# Patient Record
Sex: FEMALE | Race: WHITE | NOT HISPANIC OR LATINO | Employment: UNEMPLOYED | URBAN - METROPOLITAN AREA
[De-identification: names, ages, dates, MRNs, and addresses within clinical notes are randomized per-mention and may not be internally consistent; named-entity substitution may affect disease eponyms.]

---

## 2017-05-08 ENCOUNTER — LAB REQUISITION (OUTPATIENT)
Dept: LAB | Facility: HOSPITAL | Age: 57
End: 2017-05-08
Payer: COMMERCIAL

## 2017-05-08 ENCOUNTER — ALLSCRIPTS OFFICE VISIT (OUTPATIENT)
Dept: OTHER | Facility: OTHER | Age: 57
End: 2017-05-08

## 2017-05-08 DIAGNOSIS — E11.40 TYPE 2 DIABETES MELLITUS WITH DIABETIC NEUROPATHY (HCC): ICD-10-CM

## 2017-05-08 DIAGNOSIS — E55.9 VITAMIN D DEFICIENCY: ICD-10-CM

## 2017-05-08 DIAGNOSIS — E11.65 TYPE 2 DIABETES MELLITUS WITH HYPERGLYCEMIA (HCC): ICD-10-CM

## 2017-05-08 DIAGNOSIS — R01.1 CARDIAC MURMUR: ICD-10-CM

## 2017-05-08 LAB
25(OH)D3 SERPL-MCNC: 13.1 NG/ML (ref 30–100)
ALBUMIN SERPL BCP-MCNC: 3.3 G/DL (ref 3.5–5)
ALP SERPL-CCNC: 153 U/L (ref 46–116)
ALT SERPL W P-5'-P-CCNC: 48 U/L (ref 12–78)
ANION GAP SERPL CALCULATED.3IONS-SCNC: 11 MMOL/L (ref 4–13)
AST SERPL W P-5'-P-CCNC: 20 U/L (ref 5–45)
BILIRUB SERPL-MCNC: 0.33 MG/DL (ref 0.2–1)
BUN SERPL-MCNC: 16 MG/DL (ref 5–25)
CALCIUM SERPL-MCNC: 9 MG/DL (ref 8.3–10.1)
CHLORIDE SERPL-SCNC: 101 MMOL/L (ref 100–108)
CHOLEST SERPL-MCNC: 327 MG/DL (ref 50–200)
CO2 SERPL-SCNC: 25 MMOL/L (ref 21–32)
CREAT SERPL-MCNC: 0.56 MG/DL (ref 0.6–1.3)
CREAT UR-MCNC: 57.3 MG/DL
EST. AVERAGE GLUCOSE BLD GHB EST-MCNC: 286 MG/DL
GFR SERPL CREATININE-BSD FRML MDRD: >60 ML/MIN/1.73SQ M
GLUCOSE P FAST SERPL-MCNC: 309 MG/DL (ref 65–99)
HBA1C MFR BLD: 11.6 % (ref 4.2–6.3)
HDLC SERPL-MCNC: 39 MG/DL (ref 40–60)
MICROALBUMIN UR-MCNC: 1590 MG/L (ref 0–20)
MICROALBUMIN/CREAT 24H UR: 2775 MG/G CREATININE (ref 0–30)
POTASSIUM SERPL-SCNC: 4.3 MMOL/L (ref 3.5–5.3)
PROT SERPL-MCNC: 6.5 G/DL (ref 6.4–8.2)
SODIUM SERPL-SCNC: 137 MMOL/L (ref 136–145)
TRIGL SERPL-MCNC: 587 MG/DL
TSH SERPL DL<=0.05 MIU/L-ACNC: 3.61 UIU/ML (ref 0.36–3.74)

## 2017-05-08 PROCEDURE — 84443 ASSAY THYROID STIM HORMONE: CPT | Performed by: PHYSICIAN ASSISTANT

## 2017-05-08 PROCEDURE — 83036 HEMOGLOBIN GLYCOSYLATED A1C: CPT | Performed by: PHYSICIAN ASSISTANT

## 2017-05-08 PROCEDURE — 82306 VITAMIN D 25 HYDROXY: CPT | Performed by: PHYSICIAN ASSISTANT

## 2017-05-08 PROCEDURE — 80061 LIPID PANEL: CPT | Performed by: PHYSICIAN ASSISTANT

## 2017-05-08 PROCEDURE — 80053 COMPREHEN METABOLIC PANEL: CPT | Performed by: PHYSICIAN ASSISTANT

## 2017-05-08 PROCEDURE — 82043 UR ALBUMIN QUANTITATIVE: CPT | Performed by: PHYSICIAN ASSISTANT

## 2017-05-08 PROCEDURE — 82570 ASSAY OF URINE CREATININE: CPT | Performed by: PHYSICIAN ASSISTANT

## 2017-05-12 ENCOUNTER — GENERIC CONVERSION - ENCOUNTER (OUTPATIENT)
Dept: OTHER | Facility: OTHER | Age: 57
End: 2017-05-12

## 2018-01-09 NOTE — PROGRESS NOTES
Assessment    1  Benign essential hypertension (401 1) (I10)   2  Diabetes type 2, uncontrolled (250 02) (E11 65)   3  Swelling of both hands (729 81) (M79 89)    Plan  Benign essential hypertension    · Follow-up visit in 1 month Evaluation and Treatment  Follow-up  Status: Complete  Done:  62MJB9507  Benign essential hypertension, Tachycardia    · Metoprolol Succinate ER 50 MG Oral Tablet Extended Release 24 Hour; Take 1  tablet daily  Diabetes type 2, uncontrolled    · Janumet  MG Oral Tablet; take 1 tablet twice daily as directed  Swelling of both hands    · * XR HAND 3+ VIEW LEFT; Status:Active; Requested XZI:07PWO0607;    · * XR HAND 3+ VIEW RIGHT; Status:Active; Requested TRB:96IYJ0650;     Discussion/Summary    Discussed pt's uncontrolled Type 2 DM and her issues with affordability of her medication  She needs to let us know as soon as possible regarding these things as she has now been off her Janumet for 3 weeks and is thus not feeling well and her sugars are up  I gave her #56 tablets worth of samples to cover her for 4 weeks and gave her Rx savings card with 30 day Rx to see if this helps bring the cost down for her  She reports that she has satisfied her deductible and it appears that Janumet is brand preferred yet she says a 90 day supply costs $1000  I will continue to sample her if not covered  Her BP is significantly uncontrolled today despite her taking all of her BP meds regularly and compliantly  She risks another stroke if it continues to be this high  I will increase her Metoprolol to 50 mg once daily and would like her to buy BP cuff to use at home to record log of readings daily and I would like her to F/U in 1 month  If no improvement in her BP at that time, will need to work up further which may include kidney US and referral to a nephrologist/HTN specialist  I will further evaluate her B/L hand pain and swelling first with X-rays and call with results once obtained   If unremarkable, will check BW for autoimmune/arthritis panel  Possible side effects of new medications were reviewed with the patient/guardian today  The treatment plan was reviewed with the patient/guardian  The patient/guardian understands and agrees with the treatment plan      Chief Complaint  Pt here to discuss diabetes  She has stopped her Janumet because of cost  She is also presents with joint pain  History of Present Illness  Pt  presents for F/U of her Type 2 DM  She reports she stopped her Janumet about 3 weeks ago due to cost and now her sugars have gone up on average between 30-50 mg/dl  She does not feel very well right now  She is taking all of her other meds daily and compliantly without issues  She is having swelling in her hands as well and does not know why  Review of Systems    Constitutional: as noted in HPI  Cardiovascular: No complaints of slow heart rate, no fast heart rate, no chest pain, no palpitations, no leg claudication, no lower extremity edema  Respiratory: No complaints of shortness of breath, no wheezing, no cough, no SOB on exertion, no orthopnea, no PND  Gastrointestinal: No complaints of abdominal pain, no constipation, no nausea or vomiting, no diarrhea, no bloody stools  Genitourinary: No complaints of dysuria, no incontinence, no pelvic pain, no dysmenorrhea, no vaginal discharge or bleeding  Musculoskeletal: as noted in HPI  Active Problems    1  Anxiety (300 00) (F41 9)   2  Benign essential hypertension (401 1) (I10)   3  Blurred vision (368 8) (H53 8)   4  Cerebrovascular accident (CVA) (434 91) (I63 9)   5  Depression (311) (F32 9)   6  Diabetes type 2, uncontrolled (250 02) (E11 65)   7  Esophagitis, reflux (530 11) (K21 0)   8  Familial combined hyperlipidemia (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 2) (E78 4)   9  Tachycardia (785 0) (R00 0)   10  Vitamin D deficiency (268 9) (E55 9)    Past Medical History    1  History of Denial Of Any Significant Medical History   2   History of Diabetes Mellitus (250 00)   3  History of esophageal reflux (V12 79) (Z87 19)   4  History of fatigue (V13 89) (Z87 898)    Family History  Mother    1  Family history of Diabetes mellitus (250 00) (E11 9)   2  Family history of Lung cancer (162 9) (C34 90)  Father    3  Family history of Cancer (199 1) (C80 1)   4  Family history of Lung cancer (162 9) (C34 90)  Brother    5  Family history of hypertension (V17 49) (Z82 49)  Family History    6  Family history of    7  Family history of hypertension (V17 49) (Z82 49)   8  Family history of No Significant Family History    Social History    · Denied: History of Alcohol Use (History)   · Denied: History of Drug Use   · Former smoker (V15 82) (S33 430)   · Occasional alcohol use  The social history was reviewed and is unchanged  Current Meds   1  ALPRAZolam 0 5 MG Oral Tablet; TAKE 1 TABLET BY MOUTH 3 TIMES A DAY AS   NEEDED; Therapy: 87QMN3594 to (Evaluate:2016)  Requested for: 00JPW4138; Last   Rx:91Yvv3333 Ordered   2  AmLODIPine Besylate 10 MG Oral Tablet; Take 1 tablet daily; Therapy: 63Ubc7870 to (Evaluate:40Ijb0961)  Requested for: 97UUM3213; Last   Rx:2016 Ordered   3  Aspirin 81 MG TABS; TAKE 1 TABLET DAILY; Therapy: 24NWJ4868 to (Evaluate:09Xhv8719) Recorded   4  Atorvastatin Calcium 40 MG Oral Tablet; take one tablet by mouth every day; Therapy: 69ABF6272 to (Evaluate:83Qeg6937)  Requested for: 14RAE7219; Last   Rx:2016 Ordered   5  BD Pen Needle Karen U/F 32G X 4 MM Miscellaneous; Patient uses 4 needles a day; Therapy: 46KYS2807 to (Evaluate:11Xlb1168)  Requested for: 86Bte9601; Last   Rx:2016 Ordered   6  HumaLOG KwikPen 100 UNIT/ML Subcutaneous Solution Pen-injector; 8 units SC with   meals + ISF Scale 30:1;   Therapy: 21WSL6193 to (Last Rx:10Goo0059) Ordered   7  Lantus SoloStar 100 UNIT/ML Subcutaneous Solution Pen-injector; INJECT 30 UNITS   UNDER THE SKIN DAILY;    Therapy: 03EAS3736 to (Evaluate:97Bpg4123)  Requested for: 59Yrb8074; Last   Rx:33Jbo5517 Ordered   8  Lisinopril 20 MG Oral Tablet; Take 1 tablet daily; Therapy: 24SXR8561 to (Evaluate:31Umt3946)  Requested for: 73LSH1039; Last   Rx:09Jun2016 Ordered   9  Metoprolol Succinate ER 25 MG Oral Tablet Extended Release 24 Hour; Take 1 tablet   daily; Therapy: 25BBP8683 to (Evaluate:72Mao5825)  Requested for: 91JEZ9981; Last   Rx:09Jun2016 Ordered   10  Omeprazole 20 MG Oral Capsule Delayed Release; take 1 capsule daily; Therapy: 75BYF6512 to (Evaluate:07Sep2016)  Requested for: 88KCV9716; Last    Rx:09Jun2016 Ordered   11  Sertraline HCl - 100 MG Oral Tablet; take 1 and 1/2 tablets by mouth once daily     Requested for: 76Ryp7997; Last Rx:35Olh0739 Ordered    The medication list was reviewed and updated today  Allergies    1  No Known Drug Allergies    Vitals  Vital Signs [Data Includes: Current Encounter]    Recorded: 27MRP6211 08:20AM Recorded: 78LZT3475 08:00AM   Temperature  98 8 F, Tympanic   Heart Rate  74   Systolic 291 225   Diastolic 549 84   Height  5 ft 4 in   Weight  212 lb 8 0 oz   BMI Calculated  36 48   BSA Calculated  2 02   O2 Saturation  98   LMP  01-Jan-2016   Pain Scale  6     Physical Exam    Constitutional   General appearance: No acute distress, well appearing and well nourished  Pulmonary   Respiratory effort: No increased work of breathing or signs of respiratory distress  Auscultation of lungs: Clear to auscultation  Cardiovascular   Auscultation of heart: Normal rate and rhythm, normal S1 and S2, without murmurs  Musculoskeletal   Inspection/palpation of joints, bones, and muscles: Abnormal   Tenderness to palpation and diffuse mild STS right hand>left hand worst in the right thumb with decreased active and passive ROM  Neurologic   Sensation: No sensory loss      Psychiatric   Orientation to person, place, and time: Normal     Mood and affect: Normal          Future Appointments    Date/Time Provider Specialty Site   07/18/2016 08:00 AM Sarmad Oconnell MD Neurology [facility]   07/12/2016 07:45 AM Olivia Gonzalez, [facility] [address]   Electronically signed by : Sandra Valdez, [facility]; Carl 10 2016  9:41AM EST                       (Author)    Electronically signed by :  Evi Davila DO; Carl 10 2016 10:58AM EST                       (Author)

## 2018-01-10 NOTE — RESULT NOTES
Verified Results  * XR HAND 3+ VIEW LEFT 10Jun2016 09:04AM Gris Indonesian Order Number: [id number]     Test Name Result Flag Reference   XR HAND 3+ VW LEFT (Report)     LEFT HAND     INDICATION: Left hand pain and swelling     COMPARISON: None     VIEWS: 3; 3 images     For the purposes of institution wide universal language the following terms will apply: (thumb=1st digit/finger, index finger=2nd digit/finger, long finger=3rd digit/finger, ring=4th digit/finger and small finger=5th digit/finger)     FINDINGS:     There is no acute fracture or dislocation  There is mild osteoarthritis of the distal interphalangeal joints  No lytic or blastic lesions are seen  There is ulnar minus variance of the wrist  Small focus of calcification or ossification noted along the dorsal margin of the wrist       IMPRESSION:       1  No acute osseous abnormality  2  Mild arthritis of the left hand     3  Ulnar minus variance of the wrist with small focus of heterotopic calcification or ossification along the dorsum       Workstation performed: [id number]     Signed by:   Hans Wu MD   6/11/16

## 2018-01-14 VITALS
SYSTOLIC BLOOD PRESSURE: 200 MMHG | BODY MASS INDEX: 34.55 KG/M2 | HEART RATE: 118 BPM | TEMPERATURE: 96.2 F | DIASTOLIC BLOOD PRESSURE: 100 MMHG | RESPIRATION RATE: 17 BRPM | WEIGHT: 202.38 LBS | OXYGEN SATURATION: 99 % | HEIGHT: 64 IN

## 2018-01-14 NOTE — RESULT NOTES
Verified Results  (1) COMPREHENSIVE METABOLIC PANEL 29AMD6170 72:53WI Gris Yi Order Number: [id number]     Test Name Result Flag Reference   SODIUM 137 mmol/L  136-145   POTASSIUM 4 3 mmol/L  3 5-5 3   CHLORIDE 101 mmol/L  100-108   CARBON DIOXIDE 25 mmol/L  21-32   ANION GAP (CALC) 11 mmol/L  4-13   BLOOD UREA NITROGEN 16 mg/dL  5-25   CREATININE 0 56 mg/dL L 0 60-1 30   Standardized to IDMS reference method   CALCIUM 9 0 mg/dL  8 3-10 1   BILI, TOTAL 0 33 mg/dL  0 20-1 00   ALK PHOSPHATAS 153 U/L H    ALT (SGPT) 48 U/L  12-78   AST(SGOT) 20 U/L  5-45   ALBUMIN 3 3 g/dL L 3 5-5 0   TOTAL PROTEIN 6 5 g/dL  6 4-8 2   eGFR Non-African American      >60 0 ml/min/1 [address] [facility] Disease Education Program recommendations are as follows:  GFR calculation is accurate only with a steady state creatinine  Chronic Kidney disease less than 60 ml/min/1 73 sq  meters  Kidney failure less than 15 ml/min/1 73 sq  meters  GLUCOSE FASTING 309 mg/dL H 65-99     (1) LIPID PANEL, FASTING 22MXY9956 10:39AM Gris Yi Order Number: [id number]     Test Name Result Flag Reference   CHOLESTEROL 327 mg/dL H    HDL,DIRECT 39 mg/dL L 40-60   Specimen collection should occur prior to Metamizole administration due to the potential for falsely depressed results     LDL CHOLESTEROL CALCULATED (Report)  0-100   Calculated LDL invalid, triglycerides >400 mg/dl      Triglyceride:       Normal       <150 mg/dl     Borderline High  150-199 mg/dl     High        200-499 mg/dl     Very High     >499 mg/dl  Cholesterol:       Desirable    <200 mg/dl    Borderline High 200-239 mg/dl    High       >239 mg/dl  HDL Cholesterol:      High  >59 mg/dL    Low   <41 mg/dL   Calculated LDL invalid, triglycerides >400 mg/dl      Triglyceride:         Normal              <150 mg/dl       Borderline High    150-199 mg/dl       High               200-499 mg/dl       Very High          >499 mg/dl  Cholesterol:         Desirable        <200 mg/dl      Borderline High  200-239 mg/dl      High             >239 mg/dl  HDL Cholesterol:        High    >59 mg/dL      Low     <41 mg/dL   TRIGLYCERIDES 587 mg/dL H <=150   Specimen collection should occur prior to N-Acetylcysteine or Metamizole administration due to the potential for falsely depressed results  (1) TSH WITH FT4 REFLEX 18QOZ0821 10:39AM Dennie Rua Order Number: [id number]     Test Name Result Flag Reference   TSH 3 610 uIU/mL  0 358-3 740   Patients undergoing fluorescein dye angiography may retain small amounts of fluorescein in the body for 48-72 hours post procedure  Samples containing fluorescein can produce falsely depressed TSH values  If the patient had this procedure,a specimen should be resubmitted post fluorescein clearance  The recommended reference ranges for TSH during pregnancy are as follows:  First trimester 0 1 to 2 5 uIU/mL  Second trimester  0 2 to 3 0 uIU/mL  Third trimester 0 3 to 3 0 uIU/m     (1) VITAMIN D 25-HYDROXY 39ARX8285 10:39AM Dennie Luc Order Number: [id number]     Test Name Result Flag Reference   VIT D 25-HYDROX 13 1 ng/mL L 30 0-100 0   This assay is a certified procedure of the CDC Vitamin D Standardization Certification Program (VDSCP)     Deficiency <20ng/ml   Insufficiency 20-30ng/ml   Sufficient  ng/ml     *Patients undergoing fluorescein dye angiography may retain small amounts of fluorescein in the body for 48-72 hours post procedure  Samples containing fluorescein can produce falsely elevated Vitamin D values  If the patient had this procedure, a specimen should be resubmitted post fluorescein clearance  (1) HEMOGLOBIN A1C 34FUJ8505 10:39AM Dennie Rua Order Number: [id number]     Test Name Result Flag Reference   HEMOGLOBIN A1C 11 6 % H 4 2-6 3   EST  AVG   GLUCOSE 286 mg/dl       (1) MICROALBUMIN CREATININE RATIO, RANDOM URINE 97DCF7524 10:39AM Maico Vogel Order Number: [id number]     Test Name Result Flag Reference   MICROALBUMIN/ CREAT R 2775 mg/g creatinine H 0-30   MICROALBUMIN,URINE 1590 0 mg/L H 0 0-20 0   CREATININE URINE 57 3 mg/dL

## 2018-01-15 NOTE — PROGRESS NOTES
Assessment    1  Cerebrovascular accident (CVA) (434 91) (I63 9)   2  Diabetes type 2, uncontrolled (250 02) (E11 65)   3  Benign essential hypertension (401 1) (I10)   4  Familial combined hyperlipidemia (272 2) (E78 4)   5  Tachycardia (785 0) (R00 0)   6  Blurred vision (368 8) (H53 8)    Plan  Benign essential hypertension, Tachycardia    · Metoprolol Succinate ER 25 MG Oral Tablet Extended Release 24 Hour; Take 1  tablet daily  Blurred vision, Diabetes type 2, uncontrolled    · Amandeep Galdamez  (Ophthalmology) Physician Referral  Consult  Status: Active  Requested  for: 69AIR7698  Care Summary provided  : Yes  Cerebrovascular accident (CVA)    · Lisinopril 20 MG Oral Tablet; Take 1 tablet daily   · *1 - [facility] NEUROLOGY Physician Referral  Consult  Status: Hold For - Scheduling   Requested for: 52MCE4610  Care Summary provided  : Yes   · *1 - [facility] SPEECH THERAPY Physician Referral  Consult; S/P CVA with dysarthria  Status:  Active  Requested for: 62FMP3902  Care Summary provided  : Yes  Cerebrovascular accident (CVA), Familial combined hyperlipidemia    · Atorvastatin Calcium 40 MG Oral Tablet; Take 1 tablet daily  Depression    · ALPRAZolam 0 5 MG Oral Tablet; Take 1 tablet 3 times daily as needed   · Sertraline HCl - 50 MG Oral Tablet; Take 1 tablet twice daily  Diabetes type 2, uncontrolled    · Janumet  MG Oral Tablet; take 1 tablet twice daily as directed  PMH: History of esophageal reflux    · Omeprazole 20 MG Oral Capsule Delayed Release (PriLOSEC); TAKE 1  CAPSULE Daily    Discussion/Summary    Discussed pt's recent hospital admission, reviewed records, and assessed her present condition  I stressed the need for continued compliance to avoid further complications and a potential repeat cerebrovascular event  I reviewed all of her medications and refilled them for her today  She will F/U with cardiology for Holter as well as endocrinology   I referred her to [facility] Neurology for consult as well as ophthalmology to evaluate her persistent blurred vision  I gave her an Rx for speech therapy so she can continue as an outpatient  I would like her to increase Lisinopril to 20 mg daily and will keep Norvasc and Toprol XL doses the same for now  She is to record BP daily at home and F/U in 1 month to reassess, sooner PRN if any issues arise  Possible side effects of new medications were reviewed with the patient/guardian today  The treatment plan was reviewed with the patient/guardian  The patient/guardian understands and agrees with the treatment plan      Chief Complaint  Pt here for f/u Hospital visit  She is doing better and admits she is non compliant  She will need all medication refills  History of Present Illness  Pt  presents for post-hospital F/U visit from recent hospital admission to [facility] from 12/13/15- 12/16/15  She reports she has been noncompliant with taking her medications and following up appropriately and she presented to the ER with a facial droop and was found to have an acute CVA in the left frontal corona radiata  She has had uncontrolled HTN and DM and these comorbidities were thought to be strongly contributory  She also had persistent tachycardia and needs to F/U with cardiology for a Holter which she has not done yet  She was placed on insulin and told to continue with Janumet and F/U with endocrinology  She is also supposed to see neurology to F/U for the stroke and ophthalmology for persistent blurred vision  She reports since her discharge, she has been compliant with all of her medications as this admission "put a scare into her " She says she will be compliant with F/U appts  on a regular basis  She needs all of her medications refilled today  Today she is feeling well and without complaints currently  She was doing speech therapy as an inpatient and was told to continue as an outpatient but has not done so        Review of Systems    Constitutional: No fever, no chills, feels well, no tiredness, no recent weight gain or weight loss  Cardiovascular: No complaints of slow heart rate, no fast heart rate, no chest pain, no palpitations, no leg claudication, no lower extremity edema  Respiratory: No complaints of shortness of breath, no wheezing, no cough, no SOB on exertion, no orthopnea, no PND  Gastrointestinal: No complaints of abdominal pain, no constipation, no nausea or vomiting, no diarrhea, no bloody stools  Genitourinary: No complaints of dysuria, no incontinence, no pelvic pain, no dysmenorrhea, no vaginal discharge or bleeding  Active Problems    1  Anxiety (300 00) (F41 9)   2  Benign essential hypertension (401 1) (I10)   3  Depression (311) (F32 9)   4  Diabetes type 2, uncontrolled (250 02) (E11 65)   5  Esophagitis, reflux (530 11) (K21 0)   6  Familial combined hyperlipidemia (272 2) (E78 4)   7  Vitamin D deficiency (268 9) (E55 9)    Past Medical History    1  History of Denial Of Any Significant Medical History   2  History of Diabetes Mellitus (250 00)   3  History of esophageal reflux (V12 79) (Z87 19)   4  History of fatigue (V13 89) (F31 761)    Family History    1  Family history of Diabetes mellitus (250 00) (E11 9)   2  Family history of Lung cancer (162 9) (C34 90)    3  Family history of Cancer (199 1) (C80 1)   4  Family history of Lung cancer (162 9) (C34 90)    5  Family history of hypertension (V17 49) (Z82 49)    6  Family history of    7  Family history of hypertension (V17 49) (Z82 49)   8  Family history of No Significant Family History    Social History    · Denied: History of Alcohol Use (History)   · Denied: History of Drug Use   · Former smoker ( 82) (O42 594)   · Former smoker ( 82) (M48 976)   · Occasional alcohol use    Current Meds   1  ALPRAZolam 0 5 MG Oral Tablet; Take 1 tablet 3 times daily as needed Recorded   2  AmLODIPine Besylate 10 MG Oral Tablet; take 1 tablet every day;    Therapy: 17Wcj6038 to (Evaluate:06Jan2016)  Requested for: 83AWA6415; Last   Rx:44Gws9025 Ordered   3  Aspirin 81 MG Oral Tablet; TAKE 1 TABLET DAILY; Therapy: 84BSR4107 to (Evaluate:41Rgb2308) Recorded   4  BD Pen Needle Karen U/F 32G X 4 MM Miscellaneous; use as directed; Therapy: 20CZK8270 to (Evaluate:46Mud4401) Recorded   5  HumaLOG KwikPen 100 UNIT/ML Subcutaneous Solution Pen-injector; 8 units SC with   meals + ISF Scale 30:1;   Therapy: 81CJO9702 to Recorded   6  Janumet  MG Oral Tablet; take 1 tablet twice daily as directed; Therapy: 83TFA3918 to (Evaluate:06Jan2016)  Requested for: 34VNT9719; Last   Rx:28Wcq3022 Ordered   7  Lantus SoloStar 100 UNIT/ML Subcutaneous Solution Pen-injector; INJECT 30 UNIT   Bedtime; Therapy: 62JQZ3935 to Recorded   8  Omeprazole 20 MG Oral Capsule Delayed Release; TAKE 1 CAPSULE Daily  Requested   for: 18WRV6123; Last Rx:78Dky0786 Ordered   9  Sertraline HCl - 50 MG Oral Tablet Recorded    The medication list was reviewed and updated today  Allergies    1  No Known Drug Allergies    Vitals  Vital Signs [Data Includes: Current Encounter]    Recorded: 30HHP7307 10:05AM Recorded: 86VWA0082 09:45AM   Temperature  98 9 F, Tympanic   Heart Rate  82   Systolic 642 348   Diastolic 80 90   Height  5 ft 4 in   Weight  205 lb 4 48 oz   BMI Calculated  35 24   BSA Calculated  1 99   O2 Saturation  99     Physical Exam    Constitutional   General appearance: No acute distress, well appearing and well nourished  Ears, Nose, Mouth, and Throat   Oropharynx: Normal with no erythema, edema, exudate or lesions  Pulmonary   Respiratory effort: No increased work of breathing or signs of respiratory distress  Auscultation of lungs: Clear to auscultation  Cardiovascular   Auscultation of heart: Normal rate and rhythm, normal S1 and S2, without murmurs      Examination of extremities for edema and/or varicosities: Normal     Carotid pulses: Normal     Lymphatic   Palpation of lymph nodes in neck: No lymphadenopathy  Psychiatric   Orientation to person, place, and time: Normal     Mood and affect: Normal     Additional Exam:  Neck supple; no thyromegaly  Future Appointments    Date/Time Provider Specialty Site   02/15/2016 07:45 AM Cj Gonzalez, [facility] [address] [place] [address]     Signatures   Electronically signed by :  Radha Andrade [facility]; Jan 18 2016 11:59AM EST                       (Author)    Electronically signed by : Lucie Trammell DO; Jan 20 2016  6:09PM EST                       (Co-author)

## 2018-01-31 RX ORDER — ALPRAZOLAM 0.5 MG/1
TABLET ORAL
Qty: 90 TABLET | Refills: 0 | OUTPATIENT
Start: 2018-01-31

## 2018-02-05 RX ORDER — METOPROLOL SUCCINATE 100 MG/1
1 TABLET, EXTENDED RELEASE ORAL DAILY
Status: ON HOLD | COMMUNITY
Start: 2016-01-15 | End: 2018-03-02

## 2018-02-05 RX ORDER — SERTRALINE HYDROCHLORIDE 100 MG/1
TABLET, FILM COATED ORAL
COMMUNITY
Start: 2016-12-28 | End: 2018-03-02 | Stop reason: HOSPADM

## 2018-02-05 RX ORDER — AMLODIPINE BESYLATE 10 MG/1
1 TABLET ORAL DAILY
Status: ON HOLD | COMMUNITY
Start: 2013-09-13 | End: 2018-03-02

## 2018-02-05 RX ORDER — ATORVASTATIN CALCIUM 40 MG/1
1 TABLET, FILM COATED ORAL DAILY
Status: ON HOLD | COMMUNITY
Start: 2016-01-15 | End: 2018-03-02

## 2018-02-05 RX ORDER — MELOXICAM 15 MG/1
1 TABLET ORAL
COMMUNITY
Start: 2017-05-08 | End: 2018-09-07 | Stop reason: ALTCHOICE

## 2018-02-05 RX ORDER — OMEPRAZOLE 20 MG/1
1 CAPSULE, DELAYED RELEASE ORAL DAILY
COMMUNITY
Start: 2016-06-09 | End: 2018-03-28 | Stop reason: ALTCHOICE

## 2018-02-05 RX ORDER — TRAMADOL HYDROCHLORIDE 50 MG/1
1 TABLET ORAL
COMMUNITY
Start: 2017-05-08 | End: 2018-03-02 | Stop reason: HOSPADM

## 2018-02-05 RX ORDER — LISINOPRIL 40 MG/1
1 TABLET ORAL DAILY
Status: ON HOLD | COMMUNITY
Start: 2016-01-15 | End: 2018-03-02

## 2018-02-05 RX ORDER — ALPRAZOLAM 0.5 MG/1
1 TABLET ORAL 3 TIMES DAILY PRN
COMMUNITY
Start: 2016-05-30 | End: 2018-03-02 | Stop reason: HOSPADM

## 2018-02-23 ENCOUNTER — APPOINTMENT (EMERGENCY)
Dept: CT IMAGING | Facility: HOSPITAL | Age: 58
DRG: 208 | End: 2018-02-23
Payer: COMMERCIAL

## 2018-02-23 ENCOUNTER — APPOINTMENT (EMERGENCY)
Dept: RADIOLOGY | Facility: HOSPITAL | Age: 58
DRG: 208 | End: 2018-02-23
Payer: COMMERCIAL

## 2018-02-23 ENCOUNTER — HOSPITAL ENCOUNTER (INPATIENT)
Facility: HOSPITAL | Age: 58
LOS: 7 days | Discharge: HOME | DRG: 208 | End: 2018-03-02
Attending: EMERGENCY MEDICINE | Admitting: INTERNAL MEDICINE
Payer: COMMERCIAL

## 2018-02-23 DIAGNOSIS — R56.9 SEIZURE (HCC): ICD-10-CM

## 2018-02-23 DIAGNOSIS — J18.9 PNEUMONIA: Primary | ICD-10-CM

## 2018-02-23 DIAGNOSIS — I10 ACCELERATED HYPERTENSION: ICD-10-CM

## 2018-02-23 DIAGNOSIS — E78.5 HYPERLIPIDEMIA: ICD-10-CM

## 2018-02-23 DIAGNOSIS — J96.01 ACUTE RESPIRATORY FAILURE WITH HYPOXIA (HCC): ICD-10-CM

## 2018-02-23 DIAGNOSIS — I16.0 HYPERTENSIVE URGENCY: ICD-10-CM

## 2018-02-23 DIAGNOSIS — F32.A DEPRESSION: ICD-10-CM

## 2018-02-23 DIAGNOSIS — R09.02 HYPOXIA: ICD-10-CM

## 2018-02-23 DIAGNOSIS — E11.9 DIABETES (HCC): ICD-10-CM

## 2018-02-23 PROBLEM — K21.9 ESOPHAGEAL REFLUX: Status: ACTIVE | Noted: 2018-02-23

## 2018-02-23 LAB
ALBUMIN SERPL BCP-MCNC: 3.2 G/DL (ref 3.5–5)
ALP SERPL-CCNC: 219 U/L (ref 46–116)
ALT SERPL W P-5'-P-CCNC: 33 U/L (ref 12–78)
AMORPH URATE CRY URNS QL MICRO: ABNORMAL /HPF
ANION GAP SERPL CALCULATED.3IONS-SCNC: 13 MMOL/L (ref 4–13)
APTT PPP: 26 SECONDS (ref 23–35)
AST SERPL W P-5'-P-CCNC: 20 U/L (ref 5–45)
ATRIAL RATE: 108 BPM
BACTERIA UR QL AUTO: ABNORMAL /HPF
BASOPHILS # BLD AUTO: 0.03 THOUSANDS/ΜL (ref 0–0.1)
BASOPHILS NFR BLD AUTO: 0 % (ref 0–1)
BILIRUB SERPL-MCNC: 0.54 MG/DL (ref 0.2–1)
BILIRUB UR QL STRIP: NEGATIVE
BUN SERPL-MCNC: 9 MG/DL (ref 5–25)
CALCIUM SERPL-MCNC: 9 MG/DL (ref 8.3–10.1)
CHLORIDE SERPL-SCNC: 99 MMOL/L (ref 100–108)
CLARITY UR: ABNORMAL
CO2 SERPL-SCNC: 26 MMOL/L (ref 21–32)
COLOR UR: YELLOW
CREAT SERPL-MCNC: 0.64 MG/DL (ref 0.6–1.3)
EOSINOPHIL # BLD AUTO: 0.09 THOUSAND/ΜL (ref 0–0.61)
EOSINOPHIL NFR BLD AUTO: 1 % (ref 0–6)
ERYTHROCYTE [DISTWIDTH] IN BLOOD BY AUTOMATED COUNT: 14.1 % (ref 11.6–15.1)
FINE GRAN CASTS URNS QL MICRO: ABNORMAL /LPF
GFR SERPL CREATININE-BSD FRML MDRD: 100 ML/MIN/1.73SQ M
GLUCOSE SERPL-MCNC: 290 MG/DL (ref 65–140)
GLUCOSE SERPL-MCNC: 313 MG/DL (ref 65–140)
GLUCOSE SERPL-MCNC: 326 MG/DL (ref 65–140)
GLUCOSE UR STRIP-MCNC: ABNORMAL MG/DL
HCT VFR BLD AUTO: 38.5 % (ref 34.8–46.1)
HGB BLD-MCNC: 12.9 G/DL (ref 11.5–15.4)
HGB UR QL STRIP.AUTO: ABNORMAL
INR PPP: 0.96 (ref 0.86–1.16)
KETONES UR STRIP-MCNC: NEGATIVE MG/DL
LEUKOCYTE ESTERASE UR QL STRIP: NEGATIVE
LYMPHOCYTES # BLD AUTO: 0.67 THOUSANDS/ΜL (ref 0.6–4.47)
LYMPHOCYTES NFR BLD AUTO: 5 % (ref 14–44)
MCH RBC QN AUTO: 28.2 PG (ref 26.8–34.3)
MCHC RBC AUTO-ENTMCNC: 33.5 G/DL (ref 31.4–37.4)
MCV RBC AUTO: 84 FL (ref 82–98)
MONOCYTES # BLD AUTO: 0.81 THOUSAND/ΜL (ref 0.17–1.22)
MONOCYTES NFR BLD AUTO: 6 % (ref 4–12)
MUCOUS THREADS UR QL AUTO: ABNORMAL
NEUTROPHILS # BLD AUTO: 11.69 THOUSANDS/ΜL (ref 1.85–7.62)
NEUTS SEG NFR BLD AUTO: 88 % (ref 43–75)
NITRITE UR QL STRIP: NEGATIVE
NON-SQ EPI CELLS URNS QL MICRO: ABNORMAL /HPF
NRBC BLD AUTO-RTO: 0 /100 WBCS
NT-PROBNP SERPL-MCNC: 823 PG/ML
P AXIS: 38 DEGREES
PH UR STRIP.AUTO: 6.5 [PH] (ref 4.5–8)
PLATELET # BLD AUTO: 266 THOUSANDS/UL (ref 149–390)
PMV BLD AUTO: 9.1 FL (ref 8.9–12.7)
POTASSIUM SERPL-SCNC: 3 MMOL/L (ref 3.5–5.3)
PR INTERVAL: 124 MS
PROT SERPL-MCNC: 7.6 G/DL (ref 6.4–8.2)
PROT UR STRIP-MCNC: >=300 MG/DL
PROTHROMBIN TIME: 12.8 SECONDS (ref 12.1–14.4)
QRS AXIS: 46 DEGREES
QRSD INTERVAL: 84 MS
QT INTERVAL: 368 MS
QTC INTERVAL: 493 MS
RBC # BLD AUTO: 4.57 MILLION/UL (ref 3.81–5.12)
RBC #/AREA URNS AUTO: ABNORMAL /HPF
SODIUM SERPL-SCNC: 138 MMOL/L (ref 136–145)
SP GR UR STRIP.AUTO: 1.02 (ref 1–1.03)
T WAVE AXIS: 73 DEGREES
TROPONIN I SERPL-MCNC: <0.02 NG/ML
UROBILINOGEN UR QL STRIP.AUTO: 0.2 E.U./DL
VENTRICULAR RATE: 108 BPM
WBC # BLD AUTO: 13.29 THOUSAND/UL (ref 4.31–10.16)
WBC #/AREA URNS AUTO: ABNORMAL /HPF

## 2018-02-23 PROCEDURE — 87040 BLOOD CULTURE FOR BACTERIA: CPT | Performed by: EMERGENCY MEDICINE

## 2018-02-23 PROCEDURE — 83880 ASSAY OF NATRIURETIC PEPTIDE: CPT | Performed by: EMERGENCY MEDICINE

## 2018-02-23 PROCEDURE — 80053 COMPREHEN METABOLIC PANEL: CPT | Performed by: EMERGENCY MEDICINE

## 2018-02-23 PROCEDURE — 81001 URINALYSIS AUTO W/SCOPE: CPT

## 2018-02-23 PROCEDURE — 87798 DETECT AGENT NOS DNA AMP: CPT | Performed by: EMERGENCY MEDICINE

## 2018-02-23 PROCEDURE — 81002 URINALYSIS NONAUTO W/O SCOPE: CPT | Performed by: EMERGENCY MEDICINE

## 2018-02-23 PROCEDURE — 93010 ELECTROCARDIOGRAM REPORT: CPT | Performed by: INTERNAL MEDICINE

## 2018-02-23 PROCEDURE — 85610 PROTHROMBIN TIME: CPT | Performed by: EMERGENCY MEDICINE

## 2018-02-23 PROCEDURE — 84484 ASSAY OF TROPONIN QUANT: CPT | Performed by: EMERGENCY MEDICINE

## 2018-02-23 PROCEDURE — 96376 TX/PRO/DX INJ SAME DRUG ADON: CPT

## 2018-02-23 PROCEDURE — 85730 THROMBOPLASTIN TIME PARTIAL: CPT | Performed by: EMERGENCY MEDICINE

## 2018-02-23 PROCEDURE — 85025 COMPLETE CBC W/AUTO DIFF WBC: CPT | Performed by: EMERGENCY MEDICINE

## 2018-02-23 PROCEDURE — 36415 COLL VENOUS BLD VENIPUNCTURE: CPT | Performed by: EMERGENCY MEDICINE

## 2018-02-23 PROCEDURE — 71275 CT ANGIOGRAPHY CHEST: CPT

## 2018-02-23 PROCEDURE — 99223 1ST HOSP IP/OBS HIGH 75: CPT | Performed by: PHYSICIAN ASSISTANT

## 2018-02-23 PROCEDURE — 93005 ELECTROCARDIOGRAM TRACING: CPT

## 2018-02-23 PROCEDURE — 93005 ELECTROCARDIOGRAM TRACING: CPT | Performed by: EMERGENCY MEDICINE

## 2018-02-23 PROCEDURE — 87449 NOS EACH ORGANISM AG IA: CPT | Performed by: FAMILY MEDICINE

## 2018-02-23 PROCEDURE — 71046 X-RAY EXAM CHEST 2 VIEWS: CPT

## 2018-02-23 PROCEDURE — 96374 THER/PROPH/DIAG INJ IV PUSH: CPT

## 2018-02-23 PROCEDURE — 82948 REAGENT STRIP/BLOOD GLUCOSE: CPT

## 2018-02-23 RX ORDER — ALPRAZOLAM 0.5 MG/1
0.5 TABLET ORAL 3 TIMES DAILY PRN
Status: DISCONTINUED | OUTPATIENT
Start: 2018-02-23 | End: 2018-02-25

## 2018-02-23 RX ORDER — ACETAMINOPHEN 325 MG/1
650 TABLET ORAL EVERY 6 HOURS PRN
Status: DISCONTINUED | OUTPATIENT
Start: 2018-02-23 | End: 2018-02-25

## 2018-02-23 RX ORDER — METOPROLOL SUCCINATE 50 MG/1
100 TABLET, EXTENDED RELEASE ORAL DAILY
Status: DISCONTINUED | OUTPATIENT
Start: 2018-02-24 | End: 2018-02-25

## 2018-02-23 RX ORDER — MAGNESIUM HYDROXIDE/ALUMINUM HYDROXICE/SIMETHICONE 120; 1200; 1200 MG/30ML; MG/30ML; MG/30ML
30 SUSPENSION ORAL EVERY 6 HOURS PRN
Status: DISCONTINUED | OUTPATIENT
Start: 2018-02-23 | End: 2018-02-25

## 2018-02-23 RX ORDER — ATORVASTATIN CALCIUM 40 MG/1
40 TABLET, FILM COATED ORAL
Status: DISCONTINUED | OUTPATIENT
Start: 2018-02-24 | End: 2018-02-25

## 2018-02-23 RX ORDER — ONDANSETRON 2 MG/ML
4 INJECTION INTRAMUSCULAR; INTRAVENOUS EVERY 6 HOURS PRN
Status: DISCONTINUED | OUTPATIENT
Start: 2018-02-23 | End: 2018-02-25

## 2018-02-23 RX ORDER — POTASSIUM CHLORIDE 20 MEQ/1
40 TABLET, EXTENDED RELEASE ORAL ONCE
Status: COMPLETED | OUTPATIENT
Start: 2018-02-23 | End: 2018-02-23

## 2018-02-23 RX ORDER — AMLODIPINE BESYLATE 5 MG/1
10 TABLET ORAL DAILY
Status: DISCONTINUED | OUTPATIENT
Start: 2018-02-24 | End: 2018-02-25

## 2018-02-23 RX ORDER — LISINOPRIL 20 MG/1
40 TABLET ORAL DAILY
Status: DISCONTINUED | OUTPATIENT
Start: 2018-02-24 | End: 2018-02-25

## 2018-02-23 RX ORDER — SODIUM CHLORIDE AND POTASSIUM CHLORIDE .9; .15 G/100ML; G/100ML
125 SOLUTION INTRAVENOUS CONTINUOUS
Status: DISCONTINUED | OUTPATIENT
Start: 2018-02-23 | End: 2018-02-24

## 2018-02-23 RX ORDER — ASPIRIN 81 MG/1
81 TABLET, CHEWABLE ORAL DAILY
Status: DISCONTINUED | OUTPATIENT
Start: 2018-02-24 | End: 2018-02-25

## 2018-02-23 RX ORDER — PANTOPRAZOLE SODIUM 40 MG/1
40 TABLET, DELAYED RELEASE ORAL
Status: DISCONTINUED | OUTPATIENT
Start: 2018-02-24 | End: 2018-02-25

## 2018-02-23 RX ORDER — SERTRALINE HYDROCHLORIDE 100 MG/1
100 TABLET, FILM COATED ORAL DAILY
Status: DISCONTINUED | OUTPATIENT
Start: 2018-02-24 | End: 2018-02-25

## 2018-02-23 RX ORDER — LABETALOL HYDROCHLORIDE 5 MG/ML
10 INJECTION, SOLUTION INTRAVENOUS ONCE
Status: COMPLETED | OUTPATIENT
Start: 2018-02-23 | End: 2018-02-23

## 2018-02-23 RX ADMIN — SODIUM CHLORIDE AND POTASSIUM CHLORIDE 125 ML/HR: .9; .15 SOLUTION INTRAVENOUS at 22:05

## 2018-02-23 RX ADMIN — SERTRALINE HYDROCHLORIDE 50 MG: 50 TABLET ORAL at 22:11

## 2018-02-23 RX ADMIN — INSULIN LISPRO 3 UNITS: 100 INJECTION, SOLUTION INTRAVENOUS; SUBCUTANEOUS at 22:15

## 2018-02-23 RX ADMIN — ONDANSETRON 4 MG: 2 INJECTION INTRAMUSCULAR; INTRAVENOUS at 22:23

## 2018-02-23 RX ADMIN — CEFTRIAXONE 1000 MG: 1 INJECTION, SOLUTION INTRAVENOUS at 21:30

## 2018-02-23 RX ADMIN — LABETALOL HYDROCHLORIDE 10 MG: 5 INJECTION, SOLUTION INTRAVENOUS at 19:49

## 2018-02-23 RX ADMIN — LABETALOL 20 MG/4 ML (5 MG/ML) INTRAVENOUS SYRINGE 10 MG: at 18:29

## 2018-02-23 RX ADMIN — AZITHROMYCIN MONOHYDRATE 500 MG: 500 INJECTION, POWDER, LYOPHILIZED, FOR SOLUTION INTRAVENOUS at 22:05

## 2018-02-23 RX ADMIN — POTASSIUM CHLORIDE 40 MEQ: 1500 TABLET, EXTENDED RELEASE ORAL at 19:54

## 2018-02-23 RX ADMIN — IOHEXOL 85 ML: 350 INJECTION, SOLUTION INTRAVENOUS at 20:20

## 2018-02-23 NOTE — ED NOTES
Pt  Reports noncompliant with medications since November due to financial problems       Mar Overton RN  02/23/18 [id number]

## 2018-02-23 NOTE — ED PROVIDER NOTES
History  Chief Complaint   Patient presents with    Flu Symptoms     pt  reports generalized weakness, diaphoresis and subjective fevers       History provided by:  Patient   used: No    Flu Symptoms   Presenting symptoms: cough and shortness of breath    Presenting symptoms: no diarrhea, no fever, no headaches, no nausea, no sore throat and no vomiting    Shortness of breath:     Severity:  Moderate    Onset quality:  Gradual    Duration:  2 weeks    Timing:  Intermittent    Progression:  Waxing and waning  Severity:  Moderate  Onset quality:  Gradual  Duration:  2 weeks  Progression:  Waxing and waning  Chronicity:  New  Relieved by:  Nothing  Worsened by:  Certain positions  Ineffective treatments:  None tried  Associated symptoms: no chills, no neck stiffness and no syncope    Risk factors: diabetes        Prior to Admission Medications   Prescriptions Last Dose Informant Patient Reported? Taking?    ALPRAZolam (XANAX) 0 5 mg tablet More than a month at Unknown time  Yes No   Sig: Take 1 tablet by mouth 3 (three) times a day as needed   Insulin Pen Needle (BD PEN NEEDLE JOHN U/F) 32G X 4 MM MISC More than a month at Unknown time  Yes No   Sig: by Does not apply route   amLODIPine (NORVASC) 10 mg tablet More than a month at Unknown time  Yes No   Sig: Take 1 tablet by mouth daily   aspirin 81 MG tablet More than a month at Unknown time  Yes No   Sig: Take 1 tablet by mouth daily   atorvastatin (LIPITOR) 40 mg tablet More than a month at Unknown time  Yes No   Sig: Take 1 tablet by mouth daily   insulin aspart (NOVOLOG FLEXPEN) 100 Units/mL SOPN More than a month at Unknown time  Yes No   Sig: Inject under the skin   insulin glargine (LANTUS SOLOSTAR) injection pen 100 units/mL More than a month at Unknown time  Yes No   Sig: Inject under the skin   lisinopril (ZESTRIL) 40 mg tablet More than a month at Unknown time  Yes No   Sig: Take 1 tablet by mouth daily   meloxicam (MOBIC) 15 mg tablet More than a month at Unknown time  Yes No   Sig: Take 1 tablet by mouth   metoprolol succinate (TOPROL-XL) 100 mg 24 hr tablet More than a month at Unknown time  Yes No   Sig: Take 1 tablet by mouth daily   omeprazole (PriLOSEC) 20 mg delayed release capsule More than a month at Unknown time  Yes No   Sig: Take 1 capsule by mouth daily   sertraline (ZOLOFT) 100 mg tablet More than a month at Unknown time  Yes No   Sig: Take by mouth   sertraline (ZOLOFT) 50 mg tablet More than a month at Unknown time  Yes No   Sig: Take 1 tablet by mouth 2 (two) times a day   sitaGLIPtin-metFORMIN (JANUMET)  MG per tablet More than a month at Unknown time  Yes No   Sig: Take 1 tablet by mouth 2 (two) times a day   traMADol (ULTRAM) 50 mg tablet More than a month at Unknown time  Yes No   Sig: Take 1 tablet by mouth      Facility-Administered Medications: None       Past Medical History:   Diagnosis Date    Diabetes ([facility] )     Esophageal reflux     28 APR 2015 RESOLVED    Hyperlipidemia     Hypertension     Stroke ([facility] )     RESOLVED 08MAY 2017       History reviewed  No pertinent surgical history  Family History   Problem Relation Age of Onset    Diabetes Mother     Lung cancer Mother     Cancer Father     Lung cancer Father     Hypertension Brother     Hypertension Family      I have reviewed and agree with the history as documented  Social History   Substance Use Topics    Smoking status: Former Smoker    Smokeless tobacco: Never Used    Alcohol use No      Comment: OCCASIONAL ALCOHOL USE IN ALL SCRIPTS        Review of Systems   Constitutional: Negative for activity change, chills and fever  HENT: Negative for facial swelling, sore throat and trouble swallowing  Eyes: Negative for pain and visual disturbance  Respiratory: Positive for cough and shortness of breath  Negative for chest tightness  Cardiovascular: Negative for chest pain and leg swelling     Gastrointestinal: Negative for abdominal pain, blood in stool, diarrhea, nausea and vomiting  Genitourinary: Negative for dysuria and flank pain  Musculoskeletal: Negative for back pain, neck pain and neck stiffness  Skin: Negative for pallor and rash  Allergic/Immunologic: Negative for environmental allergies and immunocompromised state  Neurological: Negative for dizziness and headaches  Hematological: Negative for adenopathy  Does not bruise/bleed easily  Psychiatric/Behavioral: Negative for agitation and behavioral problems  All other systems reviewed and are negative  Physical Exam  ED Triage Vitals [02/23/18 1744]   Temperature Pulse Respirations Blood Pressure SpO2   100 3 °F (37 9 °C) (!) 111 20 (!) 232/111 96 %      Temp Source Heart Rate Source Patient Position - Orthostatic VS BP Location FiO2 (%)   Oral Monitor Sitting Right arm --      Pain Score       No Pain           Orthostatic Vital Signs  Vitals:    02/23/18 2210 02/23/18 2312 02/24/18 0000 02/24/18 0044   BP: (!) 180/83 (!) 196/86  (!) 198/97   Pulse: 92 96 94 97   Patient Position - Orthostatic VS: Sitting Sitting  Lying       Physical Exam   Constitutional: She is oriented to person, place, and time  She appears well-developed and well-nourished  No distress  HENT:   Head: Normocephalic and atraumatic  Eyes: EOM are normal    Neck: Normal range of motion  Neck supple  Cardiovascular: Normal rate, regular rhythm, normal heart sounds and intact distal pulses  Pulmonary/Chest: Effort normal  She has rales (coarse rales left base)  Abdominal: Soft  Bowel sounds are normal  There is no tenderness  There is no rebound and no guarding  Musculoskeletal: Normal range of motion  Neurological: She is alert and oriented to person, place, and time  Skin: Skin is warm and dry  Psychiatric: She has a normal mood and affect  Nursing note and vitals reviewed        ED Medications  Medications   ALPRAZolam (XANAX) tablet 0 5 mg (not administered)   amLODIPine (NORVASC) tablet 10 mg (not administered)   aspirin chewable tablet 81 mg (not administered)   atorvastatin (LIPITOR) tablet 40 mg (not administered)   lisinopril (ZESTRIL) tablet 40 mg (not administered)   metoprolol succinate (TOPROL-XL) 24 hr tablet 100 mg (not administered)   pantoprazole (PROTONIX) EC tablet 40 mg (not administered)   sertraline (ZOLOFT) tablet 100 mg (not administered)   sertraline (ZOLOFT) tablet 50 mg (50 mg Oral Given 2/23/18 2211)   sodium chloride 0 9 % with KCl 20 mEq/L infusion (premix) (125 mL/hr Intravenous New Bag 2/23/18 2205)   ondansetron (ZOFRAN) injection 4 mg (4 mg Intravenous Given 2/23/18 2223)   aluminum-magnesium hydroxide-simethicone (MYLANTA) 200-200-20 mg/5 mL oral suspension 30 mL (not administered)   enoxaparin (LOVENOX) subcutaneous injection 40 mg (not administered)   insulin lispro (HumaLOG) 100 units/mL subcutaneous injection 2-12 Units (not administered)   insulin lispro (HumaLOG) 100 units/mL subcutaneous injection 1-5 Units (3 Units Subcutaneous Given 2/23/18 2215)   cefTRIAXone (ROCEPHIN) IVPB (premix) 1,000 mg (not administered)     And   azithromycin (ZITHROMAX) 500 mg in sodium chloride 0 9% 250mL IVPB 500 mg (not administered)   acetaminophen (TYLENOL) tablet 650 mg (not administered)   labetalol (NORMODYNE) injection 10 mg (10 mg Intravenous Given 2/23/18 1829)   labetalol (NORMODYNE) injection 10 mg (10 mg Intravenous Given 2/23/18 1949)   potassium chloride (K-DUR,KLOR-CON) CR tablet 40 mEq (40 mEq Oral Given 2/23/18 1954)   iohexol (OMNIPAQUE) 350 MG/ML injection (MULTI-DOSE) 85 mL (85 mL Intravenous Given 2/23/18 2020)   cefTRIAXone (ROCEPHIN) IVPB (premix) 1,000 mg (0 mg Intravenous Stopped 2/23/18 2204)   azithromycin (ZITHROMAX) 500 mg in sodium chloride 0 9% 250mL IVPB 500 mg (0 mg Intravenous Stopped 2/23/18 2309)       Diagnostic Studies  Results Reviewed     Procedure Component Value Units Date/Time    Fingerstick Glucose (POCT) [[id number]] (Abnormal) Collected:  02/23/18 2212    Lab Status:  Final result Updated:  02/23/18 2217     POC Glucose 326 (H) mg/dl     Fingerstick Glucose (POCT) [[id number]]  (Abnormal) Collected:  02/23/18 1818    Lab Status:  Final result Updated:  02/23/18 2217     POC Glucose 313 (H) mg/dl     Blood culture #1 [[id number]] Collected:  02/23/18 2130    Lab Status: In process Specimen:  Blood from Arm, Left Updated:  02/23/18 2139    Blood culture #2 [[id number]] Collected:  02/23/18 0915    Lab Status: In process Specimen:  Blood from Arm, Left Updated:  02/23/18 2139    Urine Microscopic [[id number]]  (Abnormal) Collected:  02/23/18 1931    Lab Status:  Final result Specimen:  Urine from Urine, Clean Catch Updated:  02/23/18 2026     RBC, UA 1-2 (A) /hpf      WBC, UA 4-10 (A) /hpf      Epithelial Cells Occasional /hpf      Bacteria, UA Occasional /hpf      Fine granular casts 1-2 /lpf      AMORPH URATES Occasional /hpf      MUCOUS THREADS Occasional    POCT urinalysis dipstick [[id number]]  (Abnormal) Resulted:  02/23/18 1930    Lab Status:  Final result Specimen:  Urine Updated:  02/23/18 1933    ED Urine Macroscopic [[id number]]  (Abnormal) Collected:  02/23/18 1931    Lab Status:  Final result Specimen:  Urine Updated:  02/23/18 1930     Color, UA Yellow     Clarity, UA Slightly Cloudy     pH, UA 6 5     Leukocytes, UA Negative     Nitrite, UA Negative     Protein, UA >=300 (A) mg/dl      Glucose,  (1/2%) (A) mg/dl      Ketones, UA Negative mg/dl      Urobilinogen, UA 0 2 E U /dl      Bilirubin, UA Negative     Blood, UA Moderate (A)     Specific [place], UA 1 025    Narrative:       CLINITEK RESULT    Influenza A/B and RSV by PCR (indicated for patients >2 mo of age) [[id number]] Collected:  02/23/18 1823    Lab Status:   In process Specimen:  Nasopharyngeal from Nasopharyngeal Swab Updated:  02/23/18 1906    B-type natriuretic peptide [[id number]]  (Abnormal) Collected:  02/23/18 1823    Lab Status:  Final result Specimen: Blood from Arm, Right Updated:  02/23/18 1902     NT-proBNP 823 (H) pg/mL     Comprehensive metabolic panel [[id number]]  (Abnormal) Collected:  02/23/18 1823    Lab Status:  Final result Specimen:  Blood from Arm, Right Updated:  02/23/18 1900     Sodium 138 mmol/L      Potassium 3 0 (L) mmol/L      Chloride 99 (L) mmol/L      CO2 26 mmol/L      Anion Gap 13 mmol/L      BUN 9 mg/dL      Creatinine 0 64 mg/dL      Glucose 290 (H) mg/dL      Calcium 9 0 mg/dL      AST 20 U/L      ALT 33 U/L      Alkaline Phosphatase 219 (H) U/L      Total Protein 7 6 g/dL      Albumin 3 2 (L) g/dL      Total Bilirubin 0 54 mg/dL      eGFR 100 ml/min/1 73sq m     Narrative:         National Kidney Disease Education Program recommendations are as follows:  GFR calculation is accurate only with a steady state creatinine  Chronic Kidney disease less than 60 ml/min/1 73 sq  meters  Kidney failure less than 15 ml/min/1 73 sq  meters  Troponin I [[id number]]  (Normal) Collected:  02/23/18 1823    Lab Status:  Final result Specimen:  Blood from Arm, Right Updated:  02/23/18 1856     Troponin I <0 02 ng/mL     Narrative:         [employer] Chemistry analyzer 99% cutoff is > 0 04 ng/mL in network labs    o cTnI 99% cutoff is useful only when applied to patients in the clinical setting of myocardial ischemia  o cTnI 99% cutoff should be interpreted in the context of clinical history, ECG findings and possibly cardiac imaging to establish correct diagnosis  o cTnI 99% cutoff may be suggestive but clearly not indicative of a coronary event without the clinical setting of myocardial ischemia      Claybon Homans [[id number]]  (Normal) Collected:  02/23/18 1823    Lab Status:  Final result Specimen:  Blood from Arm, Right Updated:  02/23/18 1847     Protime 12 8 seconds      INR 0 96    APTT [[id number]]  (Normal) Collected:  02/23/18 1823    Lab Status:  Final result Specimen:  Blood from Arm, Right Updated:  02/23/18 1847     PTT 26 seconds     Narrative: Therapeutic Heparin Range = 60-90 seconds    CBC and differential [[id number]]  (Abnormal) Collected:  02/23/18 1823    Lab Status:  Final result Specimen:  Blood from Arm, Right Updated:  02/23/18 1839     WBC 13 29 (H) Thousand/uL      RBC 4 57 Million/uL      Hemoglobin 12 9 g/dL      Hematocrit 38 5 %      MCV 84 fL      MCH 28 2 pg      MCHC 33 5 g/dL      RDW 14 1 %      MPV 9 1 fL      Platelets 832 Thousands/uL      nRBC 0 /100 WBCs      Neutrophils Relative 88 (H) %      Lymphocytes Relative 5 (L) %      Monocytes Relative 6 %      Eosinophils Relative 1 %      Basophils Relative 0 %      Neutrophils Absolute 11 69 (H) Thousands/µL      Lymphocytes Absolute 0 67 Thousands/µL      Monocytes Absolute 0 81 Thousand/µL      Eosinophils Absolute 0 09 Thousand/µL      Basophils Absolute 0 03 Thousands/µL                  CTA ED chest PE study   Final Result by Vanita Russell DO (02/23 2045)      No evidence of acute pulmonary wasn't  Left lower lobe pneumonia  Smaller patchy focus of pneumonia anteriorly left upper lobe  Small bilateral pleural effusions              Workstation performed: [id number]         XR chest 2 views    (Results Pending)              Procedures  ECG 12 Lead Documentation  Date/Time: 2/23/2018 6:17 PM  Performed by: Ana Diaz  Authorized by: Ana Diaz     Indications / Diagnosis:  Dyspnea  ECG reviewed by me, the ED Provider: yes    Patient location:  ED  Interpretation:     Interpretation: normal    Rate:     ECG rate assessment: normal    Rhythm:     Rhythm: sinus rhythm    Ectopy:     Ectopy: none    QRS:     QRS axis:  Normal  Conduction:     Conduction: normal    ST segments:     ST segments:  Normal  T waves:     T waves: normal    CriticalCare Time  Performed by: Srinivas Millan by: Ana Diaz     Critical care provider statement:     Critical care time (minutes):  30    Critical care time was exclusive of:  Separately billable procedures and treating other patients and teaching time    Critical care was necessary to treat or prevent imminent or life-threatening deterioration of the following conditions:  Respiratory failure (Pneumonia, Hypoxia requiring Oxygen, Accelerated Hypertension requiring IV Meds)    Critical care was time spent personally by me on the following activities:  Blood draw for specimens, obtaining history from patient or surrogate, development of treatment plan with patient or surrogate, discussions with consultants, evaluation of patient's response to treatment, examination of patient, interpretation of cardiac output measurements, ordering and performing treatments and interventions, ordering and review of laboratory studies, ordering and review of radiographic studies, re-evaluation of patient's condition and review of old charts    I assumed direction of critical care for this patient from another provider in my specialty: no             Phone Contacts  ED Phone Contact    ED Course  ED Course as of Feb 24 0111 Fri Feb 23, 2018 1941 We will give oral KCL 40 meq  Potassium: (!) 3 0   1941 We will repeat Labetalol 10 mg iv  Blood Pressure: (!) 204/91   1958 Oxygen sats were down to 88% on room, put on nasal cannula oxygen  We will do CT P/E study  2059 CT Scan chest, negative for PE, shows left lower lobe pneumonia  No signs of SIRS/sepsis at this point, patient does have white blood cell count of 13 29  Will draw blood cultures, give ceftriaxone/azithromycin, admit to Medicine                                  MDM  Number of Diagnoses or Management Options  Accelerated hypertension: new and requires workup  Hypoxia: new and requires workup  Pneumonia: new and requires workup  Diagnosis management comments: Patient is a 60-year-old female, history of diabetes, previous stroke, hypertension, ex-smoker, noncompliant with her medications including her insulin, for the past about 4 months because they have been too extensive; comes in with complaints of cough, dyspnea, subjective fevers for the past 2 weeks, patient states that dyspnea is worse on lying, the cough is associated with mild phlegm  Had recent UTI which she treated with Bactrim ordered online doctor  Patient is alert, oriented, vital signs noted mild tachycardia, hypertension, low-grade temperature, oxygen saturation 93-96%; Lung exam shows coarse crackles and diminished treat bases ; Cardiovascular normal heart sounds:  Normal, equal pulses bilaterally; Abdomen: soft, nontender, nondistended, bowel sounds present; Neuro: normal cranial nerve, motor and sensory exam, no focal deficits; no neck stiffness; Extremities: no calf swelling or tenderness, neurovascular intact distally  Differential diagnosis:  Congestive heart failure, pneumonia, flu, accelerated hypertension, noncompliance  Will check labs including CBC, CMP, troponin, EKG, chest x-ray, urine, flu CC are, give labetalol  Amount and/or Complexity of Data Reviewed  Clinical lab tests: reviewed and ordered  Tests in the radiology section of CPT®: reviewed and ordered  Tests in the medicine section of CPT®: ordered and reviewed  Discuss the patient with other providers: yes  Independent visualization of images, tracings, or specimens: yes      The patient presented with a condition in which there was a high probability of imminent or life-threatening deterioration, and critical care services (excluding separately billable procedures) totalled 30-74 minutes          Disposition  Final diagnoses:   Pneumonia   Hypoxia   Accelerated hypertension     Time reflects when diagnosis was documented in both MDM as applicable and the Disposition within this note     Time User Action Codes Description Comment    2/23/2018  9:01 PM Daly June Add [J18 9] Pneumonia     2/23/2018  9:58 PM Daly June Add [R09 02] Hypoxia     2/23/2018 10:02 PM [facility] Nolen [facility] Accelerated hypertension       ED Disposition     ED Disposition Condition Comment    Admit  Case was discussed with REAL ([place]) and the patient's admission status was agreed to be Admission Status: inpatient status to the service of Dr Dionne Farfan  Follow-up Information    None       Patient's Medications   Discharge Prescriptions    No medications on file     No discharge procedures on file      ED Provider  Electronically Signed by           Ivonne Holloway MD  02/24/18 0111

## 2018-02-23 NOTE — ED NOTES
Unable to update medication list due to pt   Not taking her medications since [address], [facility]  02/23/18 3927

## 2018-02-24 ENCOUNTER — ANESTHESIA EVENT (INPATIENT)
Dept: MEDSURG UNIT | Facility: HOSPITAL | Age: 58
DRG: 208 | End: 2018-02-24
Payer: COMMERCIAL

## 2018-02-24 ENCOUNTER — ANESTHESIA (INPATIENT)
Dept: MEDSURG UNIT | Facility: HOSPITAL | Age: 58
DRG: 208 | End: 2018-02-24
Payer: COMMERCIAL

## 2018-02-24 ENCOUNTER — APPOINTMENT (INPATIENT)
Dept: RADIOLOGY | Facility: HOSPITAL | Age: 58
DRG: 208 | End: 2018-02-24
Payer: COMMERCIAL

## 2018-02-24 ENCOUNTER — APPOINTMENT (INPATIENT)
Dept: CT IMAGING | Facility: HOSPITAL | Age: 58
DRG: 208 | End: 2018-02-24
Payer: COMMERCIAL

## 2018-02-24 PROBLEM — E66.09 CLASS 1 OBESITY DUE TO EXCESS CALORIES WITH SERIOUS COMORBIDITY AND BODY MASS INDEX (BMI) OF 33.0 TO 33.9 IN ADULT: Status: ACTIVE | Noted: 2018-02-24

## 2018-02-24 PROBLEM — E66.811 CLASS 1 OBESITY DUE TO EXCESS CALORIES WITH SERIOUS COMORBIDITY AND BODY MASS INDEX (BMI) OF 33.0 TO 33.9 IN ADULT: Status: ACTIVE | Noted: 2018-02-24

## 2018-02-24 PROBLEM — I16.0 HYPERTENSIVE URGENCY: Status: ACTIVE | Noted: 2018-02-23

## 2018-02-24 PROBLEM — G93.40 ACUTE ENCEPHALOPATHY: Status: ACTIVE | Noted: 2018-02-24

## 2018-02-24 PROBLEM — E11.65 TYPE 2 DIABETES MELLITUS WITH HYPERGLYCEMIA, WITHOUT LONG-TERM CURRENT USE OF INSULIN (HCC): Status: ACTIVE | Noted: 2018-02-23

## 2018-02-24 PROBLEM — J96.00 ACUTE RESPIRATORY FAILURE (HCC): Status: ACTIVE | Noted: 2018-02-24

## 2018-02-24 LAB
ANION GAP SERPL CALCULATED.3IONS-SCNC: 12 MMOL/L (ref 4–13)
ANION GAP SERPL CALCULATED.3IONS-SCNC: 9 MMOL/L (ref 4–13)
APTT PPP: 35 SECONDS (ref 23–35)
ARTERIAL PATENCY WRIST A: YES
BASE EXCESS BLDA CALC-SCNC: -4.2 MMOL/L
BASOPHILS # BLD AUTO: 0.05 THOUSANDS/ΜL (ref 0–0.1)
BASOPHILS NFR BLD AUTO: 0 % (ref 0–1)
BUN SERPL-MCNC: 10 MG/DL (ref 5–25)
BUN SERPL-MCNC: 12 MG/DL (ref 5–25)
CALCIUM SERPL-MCNC: 8.4 MG/DL (ref 8.3–10.1)
CALCIUM SERPL-MCNC: 8.4 MG/DL (ref 8.3–10.1)
CHLORIDE SERPL-SCNC: 102 MMOL/L (ref 100–108)
CHLORIDE SERPL-SCNC: 99 MMOL/L (ref 100–108)
CO2 SERPL-SCNC: 24 MMOL/L (ref 21–32)
CO2 SERPL-SCNC: 28 MMOL/L (ref 21–32)
CREAT SERPL-MCNC: 0.81 MG/DL (ref 0.6–1.3)
CREAT SERPL-MCNC: 0.82 MG/DL (ref 0.6–1.3)
EOSINOPHIL # BLD AUTO: 0 THOUSAND/ΜL (ref 0–0.61)
EOSINOPHIL NFR BLD AUTO: 0 % (ref 0–6)
ERYTHROCYTE [DISTWIDTH] IN BLOOD BY AUTOMATED COUNT: 14.2 % (ref 11.6–15.1)
ERYTHROCYTE [DISTWIDTH] IN BLOOD BY AUTOMATED COUNT: 14.4 % (ref 11.6–15.1)
EST. AVERAGE GLUCOSE BLD GHB EST-MCNC: 226 MG/DL
GFR SERPL CREATININE-BSD FRML MDRD: 80 ML/MIN/1.73SQ M
GFR SERPL CREATININE-BSD FRML MDRD: 81 ML/MIN/1.73SQ M
GLUCOSE SERPL-MCNC: 200 MG/DL (ref 65–140)
GLUCOSE SERPL-MCNC: 221 MG/DL (ref 65–140)
GLUCOSE SERPL-MCNC: 225 MG/DL (ref 65–140)
GLUCOSE SERPL-MCNC: 233 MG/DL (ref 65–140)
GLUCOSE SERPL-MCNC: 236 MG/DL (ref 65–140)
GLUCOSE SERPL-MCNC: 237 MG/DL (ref 65–140)
GLUCOSE SERPL-MCNC: 488 MG/DL (ref 65–140)
HBA1C MFR BLD: 9.5 % (ref 4.2–6.3)
HCO3 BLDA-SCNC: 23.2 MMOL/L (ref 22–28)
HCT VFR BLD AUTO: 33.8 % (ref 34.8–46.1)
HCT VFR BLD AUTO: 38.4 % (ref 34.8–46.1)
HGB BLD-MCNC: 11 G/DL (ref 11.5–15.4)
HGB BLD-MCNC: 12.3 G/DL (ref 11.5–15.4)
HOROWITZ INDEX BLDA+IHG-RTO: 100 MM[HG]
INR PPP: 1.11 (ref 0.86–1.16)
L PNEUMO1 AG UR QL IA.RAPID: NEGATIVE
LACTATE SERPL-SCNC: 4.6 MMOL/L (ref 0.5–2)
LYMPHOCYTES # BLD AUTO: 3.34 THOUSANDS/ΜL (ref 0.6–4.47)
LYMPHOCYTES NFR BLD AUTO: 19 % (ref 14–44)
MAGNESIUM SERPL-MCNC: 2 MG/DL (ref 1.6–2.6)
MCH RBC QN AUTO: 28.1 PG (ref 26.8–34.3)
MCH RBC QN AUTO: 28.7 PG (ref 26.8–34.3)
MCHC RBC AUTO-ENTMCNC: 32 G/DL (ref 31.4–37.4)
MCHC RBC AUTO-ENTMCNC: 32.5 G/DL (ref 31.4–37.4)
MCV RBC AUTO: 86 FL (ref 82–98)
MCV RBC AUTO: 90 FL (ref 82–98)
MONOCYTES # BLD AUTO: 1.8 THOUSAND/ΜL (ref 0.17–1.22)
MONOCYTES NFR BLD AUTO: 10 % (ref 4–12)
NEUTROPHILS # BLD AUTO: 12.35 THOUSANDS/ΜL (ref 1.85–7.62)
NEUTS SEG NFR BLD AUTO: 71 % (ref 43–75)
NRBC BLD AUTO-RTO: 0 /100 WBCS
NT-PROBNP SERPL-MCNC: 683 PG/ML
O2 CT BLDA-SCNC: 18.1 ML/DL (ref 16–23)
OXYHGB MFR BLDA: 98.1 % (ref 94–97)
PCO2 BLDA: 52.8 MM HG (ref 36–44)
PEEP RESPIRATORY: 5 CM[H2O]
PH BLDA: 7.26 [PH] (ref 7.35–7.45)
PHOSPHATE SERPL-MCNC: 4.4 MG/DL (ref 2.7–4.5)
PLATELET # BLD AUTO: 245 THOUSANDS/UL (ref 149–390)
PLATELET # BLD AUTO: 341 THOUSANDS/UL (ref 149–390)
PMV BLD AUTO: 9.1 FL (ref 8.9–12.7)
PMV BLD AUTO: 9.7 FL (ref 8.9–12.7)
PO2 BLDA: 337.2 MM HG (ref 75–129)
POTASSIUM SERPL-SCNC: 4 MMOL/L (ref 3.5–5.3)
POTASSIUM SERPL-SCNC: 4.1 MMOL/L (ref 3.5–5.3)
PROTHROMBIN TIME: 14.3 SECONDS (ref 12.1–14.4)
RBC # BLD AUTO: 3.92 MILLION/UL (ref 3.81–5.12)
RBC # BLD AUTO: 4.29 MILLION/UL (ref 3.81–5.12)
S PNEUM AG UR QL: NEGATIVE
SODIUM SERPL-SCNC: 135 MMOL/L (ref 136–145)
SODIUM SERPL-SCNC: 139 MMOL/L (ref 136–145)
SPECIMEN SOURCE: ABNORMAL
TROPONIN I SERPL-MCNC: <0.02 NG/ML
VENT AC: 16
VENT- AC: AC
VT SETTING VENT: 400 ML
WBC # BLD AUTO: 17.54 THOUSAND/UL (ref 4.31–10.16)
WBC # BLD AUTO: 8.01 THOUSAND/UL (ref 4.31–10.16)

## 2018-02-24 PROCEDURE — 84100 ASSAY OF PHOSPHORUS: CPT | Performed by: NURSE PRACTITIONER

## 2018-02-24 PROCEDURE — 85027 COMPLETE CBC AUTOMATED: CPT | Performed by: PHYSICIAN ASSISTANT

## 2018-02-24 PROCEDURE — 83605 ASSAY OF LACTIC ACID: CPT | Performed by: NURSE PRACTITIONER

## 2018-02-24 PROCEDURE — 94640 AIRWAY INHALATION TREATMENT: CPT

## 2018-02-24 PROCEDURE — 99285 EMERGENCY DEPT VISIT HI MDM: CPT

## 2018-02-24 PROCEDURE — 71045 X-RAY EXAM CHEST 1 VIEW: CPT

## 2018-02-24 PROCEDURE — 94760 N-INVAS EAR/PLS OXIMETRY 1: CPT

## 2018-02-24 PROCEDURE — 85610 PROTHROMBIN TIME: CPT | Performed by: NURSE PRACTITIONER

## 2018-02-24 PROCEDURE — 36600 WITHDRAWAL OF ARTERIAL BLOOD: CPT

## 2018-02-24 PROCEDURE — 82948 REAGENT STRIP/BLOOD GLUCOSE: CPT

## 2018-02-24 PROCEDURE — 99232 SBSQ HOSP IP/OBS MODERATE 35: CPT | Performed by: FAMILY MEDICINE

## 2018-02-24 PROCEDURE — 85730 THROMBOPLASTIN TIME PARTIAL: CPT | Performed by: NURSE PRACTITIONER

## 2018-02-24 PROCEDURE — 83036 HEMOGLOBIN GLYCOSYLATED A1C: CPT | Performed by: PHYSICIAN ASSISTANT

## 2018-02-24 PROCEDURE — 85025 COMPLETE CBC W/AUTO DIFF WBC: CPT | Performed by: NURSE PRACTITIONER

## 2018-02-24 PROCEDURE — 80048 BASIC METABOLIC PNL TOTAL CA: CPT | Performed by: PHYSICIAN ASSISTANT

## 2018-02-24 PROCEDURE — 84484 ASSAY OF TROPONIN QUANT: CPT | Performed by: NURSE PRACTITIONER

## 2018-02-24 PROCEDURE — 80048 BASIC METABOLIC PNL TOTAL CA: CPT | Performed by: NURSE PRACTITIONER

## 2018-02-24 PROCEDURE — 83880 ASSAY OF NATRIURETIC PEPTIDE: CPT | Performed by: NURSE PRACTITIONER

## 2018-02-24 PROCEDURE — 82805 BLOOD GASES W/O2 SATURATION: CPT | Performed by: NURSE PRACTITIONER

## 2018-02-24 PROCEDURE — 94002 VENT MGMT INPAT INIT DAY: CPT

## 2018-02-24 PROCEDURE — 70450 CT HEAD/BRAIN W/O DYE: CPT

## 2018-02-24 PROCEDURE — 83735 ASSAY OF MAGNESIUM: CPT | Performed by: NURSE PRACTITIONER

## 2018-02-24 RX ORDER — SODIUM CHLORIDE FOR INHALATION 0.9 %
3 VIAL, NEBULIZER (ML) INHALATION
Status: DISCONTINUED | OUTPATIENT
Start: 2018-02-24 | End: 2018-02-25

## 2018-02-24 RX ORDER — ZOLPIDEM TARTRATE 5 MG/1
5 TABLET ORAL
Status: DISCONTINUED | OUTPATIENT
Start: 2018-02-24 | End: 2018-02-25

## 2018-02-24 RX ORDER — LEVALBUTEROL 1.25 MG/.5ML
1.25 SOLUTION, CONCENTRATE RESPIRATORY (INHALATION)
Status: DISCONTINUED | OUTPATIENT
Start: 2018-02-24 | End: 2018-02-25

## 2018-02-24 RX ORDER — HYDRALAZINE HYDROCHLORIDE 20 MG/ML
5 INJECTION INTRAMUSCULAR; INTRAVENOUS EVERY 6 HOURS PRN
Status: DISCONTINUED | OUTPATIENT
Start: 2018-02-24 | End: 2018-03-02 | Stop reason: HOSPADM

## 2018-02-24 RX ORDER — INSULIN GLARGINE 100 [IU]/ML
15 INJECTION, SOLUTION SUBCUTANEOUS
Status: DISCONTINUED | OUTPATIENT
Start: 2018-02-24 | End: 2018-02-25

## 2018-02-24 RX ORDER — METOPROLOL TARTRATE 5 MG/5ML
5 INJECTION INTRAVENOUS EVERY 6 HOURS PRN
Status: DISCONTINUED | OUTPATIENT
Start: 2018-02-24 | End: 2018-02-24

## 2018-02-24 RX ORDER — GUAIFENESIN 600 MG
600 TABLET, EXTENDED RELEASE 12 HR ORAL EVERY 12 HOURS SCHEDULED
Status: DISCONTINUED | OUTPATIENT
Start: 2018-02-24 | End: 2018-02-25

## 2018-02-24 RX ORDER — BENZONATATE 100 MG/1
100 CAPSULE ORAL 3 TIMES DAILY PRN
Status: DISCONTINUED | OUTPATIENT
Start: 2018-02-24 | End: 2018-02-25

## 2018-02-24 RX ADMIN — INSULIN LISPRO 4 UNITS: 100 INJECTION, SOLUTION INTRAVENOUS; SUBCUTANEOUS at 16:24

## 2018-02-24 RX ADMIN — LEVALBUTEROL HYDROCHLORIDE 1.25 MG: 1.25 SOLUTION, CONCENTRATE RESPIRATORY (INHALATION) at 18:08

## 2018-02-24 RX ADMIN — SODIUM CHLORIDE AND POTASSIUM CHLORIDE 125 ML/HR: .9; .15 SOLUTION INTRAVENOUS at 10:07

## 2018-02-24 RX ADMIN — INSULIN LISPRO 4 UNITS: 100 INJECTION, SOLUTION INTRAVENOUS; SUBCUTANEOUS at 11:56

## 2018-02-24 RX ADMIN — INSULIN LISPRO 2 UNITS: 100 INJECTION, SOLUTION INTRAVENOUS; SUBCUTANEOUS at 21:28

## 2018-02-24 RX ADMIN — SERTRALINE HYDROCHLORIDE 50 MG: 50 TABLET ORAL at 21:09

## 2018-02-24 RX ADMIN — INSULIN LISPRO 4 UNITS: 100 INJECTION, SOLUTION INTRAVENOUS; SUBCUTANEOUS at 09:02

## 2018-02-24 RX ADMIN — ACETAMINOPHEN 650 MG: 325 TABLET, FILM COATED ORAL at 05:14

## 2018-02-24 RX ADMIN — METOPROLOL SUCCINATE 100 MG: 50 TABLET, EXTENDED RELEASE ORAL at 09:02

## 2018-02-24 RX ADMIN — AMLODIPINE BESYLATE 10 MG: 10 TABLET ORAL at 09:01

## 2018-02-24 RX ADMIN — ALPRAZOLAM 0.5 MG: 0.5 TABLET ORAL at 17:47

## 2018-02-24 RX ADMIN — METOPROLOL TARTRATE 5 MG: 5 INJECTION, SOLUTION INTRAVENOUS at 02:25

## 2018-02-24 RX ADMIN — METOPROLOL TARTRATE 5 MG: 5 INJECTION, SOLUTION INTRAVENOUS at 13:42

## 2018-02-24 RX ADMIN — ZOLPIDEM TARTRATE 5 MG: 5 TABLET ORAL at 21:09

## 2018-02-24 RX ADMIN — ASPIRIN 81 MG 81 MG: 81 TABLET ORAL at 09:01

## 2018-02-24 RX ADMIN — SERTRALINE HYDROCHLORIDE 100 MG: 100 TABLET ORAL at 09:01

## 2018-02-24 RX ADMIN — ALPRAZOLAM 0.5 MG: 0.5 TABLET ORAL at 13:42

## 2018-02-24 RX ADMIN — ENOXAPARIN SODIUM 40 MG: 40 INJECTION SUBCUTANEOUS at 09:02

## 2018-02-24 RX ADMIN — ISODIUM CHLORIDE 3 ML: 0.03 SOLUTION RESPIRATORY (INHALATION) at 18:09

## 2018-02-24 RX ADMIN — ACETAMINOPHEN 650 MG: 325 TABLET, FILM COATED ORAL at 11:40

## 2018-02-24 RX ADMIN — LISINOPRIL 40 MG: 20 TABLET ORAL at 09:01

## 2018-02-24 RX ADMIN — PANTOPRAZOLE SODIUM 40 MG: 40 TABLET, DELAYED RELEASE ORAL at 05:14

## 2018-02-24 RX ADMIN — INSULIN GLARGINE 15 UNITS: 100 INJECTION, SOLUTION SUBCUTANEOUS at 21:08

## 2018-02-24 RX ADMIN — GUAIFENESIN 600 MG: 600 TABLET, EXTENDED RELEASE ORAL at 21:08

## 2018-02-24 RX ADMIN — AZITHROMYCIN MONOHYDRATE 500 MG: 500 INJECTION, POWDER, LYOPHILIZED, FOR SOLUTION INTRAVENOUS at 21:07

## 2018-02-24 RX ADMIN — ATORVASTATIN CALCIUM 40 MG: 40 TABLET, FILM COATED ORAL at 15:43

## 2018-02-24 NOTE — H&P
History and Physical - [facility] Internal Medicine    Patient Information: Jason Sarmiento 65 y o  female MRN: [id number]  Unit/Bed#: ED 28 Encounter: [id number]  Admitting Physician: Renée Mccann PA-C  PCP: Robyn Vogel DO  Date of Admission:  02/23/18    Assessment/Plan:    Hospital Problem List:     Principal Problem:    Pneumonia  Active Problems:    Hypertension    Hyperlipidemia    Esophageal reflux    Diabetes ([address] )      Plan for the Primary Problem(s):  · pneumonia  · Admit to med/surg  Continue oxygen via nasal cannula  Continue rocephin and zithromax  CTA showed no PE  Check blood cultures and flu swab  Droplet precautions for now    Plan for Additional Problems:   · HTN- BP elevated in ED, improved after labetalol  She stopped all her home meds in novemeber, will resume and monitor BP closely  · Hyperlipidemia- resume statin  · GERD- resume protonix  · Diabetes- patient does not remember the lantus dose she was taking in November  Will check a1C  Will order sliding scale with coverage for now  May need to resume long acting insulin as well  VTE Prophylaxis: Enoxaparin (Lovenox)  / sequential compression device   Code Status: full code  POLST: There is no POLST form on file for this patient (pre-hospital)    Anticipated Length of Stay:  Patient will be admitted on an Inpatient basis with an anticipated length of stay of  Greater than 2 midnights  Justification for Hospital Stay: patient requires IV antibiotics    Total Time for Visit, including Counseling / Coordination of Care: 45 minutes  Greater than 50% of this total time spent on direct patient counseling and coordination of care  Chief Complaint:   SOB    History of Present Illness:    Jason Sarmiento is a 65 y o  female who presents with shortness of breath getting worse for 2 weeks  She has associated cough and recent vomiting and diarrhea  She has had a fever  She quit smoking 10 years ago  She denies sick contacts   She stopped all of her medications in November because she couldn't afford them  She started taking the zoloft again last week  She denies chest pain  No edema    Review of Systems:    Review of Systems   Constitutional: Positive for fever  HENT: Negative  Eyes: Negative  Respiratory: Positive for cough and shortness of breath  Cardiovascular: Negative  Gastrointestinal: Positive for diarrhea and vomiting  Endocrine: Negative  Genitourinary: Negative  Musculoskeletal: Positive for back pain  Skin: Negative  Allergic/Immunologic: Negative  Neurological: Negative  Hematological: Negative  Psychiatric/Behavioral: Negative  Past Medical and Surgical History:     Past Medical History:   Diagnosis Date    Diabetes ([facility] [address] )     Esophageal reflux     28 APR 2015 RESOLVED    Hyperlipidemia     Hypertension     Stroke ([facility] )     RESOLVED 08MAY 2017       History reviewed  No pertinent surgical history  Meds/Allergies:    Prior to Admission medications    Medication Sig Start Date End Date Taking?  Authorizing Provider   ALPRAZolam Fabiola Ready) 0 5 mg tablet Take 1 tablet by mouth 3 (three) times a day as needed 5/30/16   Historical Provider, MD   amLODIPine (NORVASC) 10 mg tablet Take 1 tablet by mouth daily 9/13/13   Historical Provider, MD   aspirin 81 MG tablet Take 1 tablet by mouth daily 1/15/16   Historical Provider, MD   atorvastatin (LIPITOR) 40 mg tablet Take 1 tablet by mouth daily 1/15/16   Historical Provider, MD   insulin aspart (NOVOLOG FLEXPEN) 100 Units/mL SOPN Inject under the skin 5/15/17   Historical Provider, MD   insulin glargine (LANTUS SOLOSTAR) injection pen 100 units/mL Inject under the skin 1/15/16   Historical Provider, MD   Insulin Pen Needle (BD PEN NEEDLE JOHN U/F) 32G X 4 MM MISC by Does not apply route 1/15/16   Historical Provider, MD   lisinopril (ZESTRIL) 40 mg tablet Take 1 tablet by mouth daily 1/15/16   Historical Provider, MD   meloxicam (MOBIC) 15 mg tablet Take 1 tablet by mouth 5/8/17   Historical Provider, MD   metoprolol succinate (TOPROL-XL) 100 mg 24 hr tablet Take 1 tablet by mouth daily 1/15/16   Historical Provider, MD   omeprazole (PriLOSEC) 20 mg delayed release capsule Take 1 capsule by mouth daily 6/9/16   Historical Provider, MD   sertraline (ZOLOFT) 100 mg tablet Take by mouth 12/28/16   Historical Provider, MD   sertraline (ZOLOFT) 50 mg tablet Take 1 tablet by mouth 2 (two) times a day 10/17/12   Historical Provider, MD   sitaGLIPtin-metFORMIN (JANUMET)  MG per tablet Take 1 tablet by mouth 2 (two) times a day 10/17/12   Historical Provider, MD   traMADol Dominga Lights) 50 mg tablet Take 1 tablet by mouth 5/8/17   Historical Provider, MD     I have reviewed home medications with patient personally  Allergies: No Known Allergies    Social History:     Marital Status: Single   Occupation: works at [employer]  Patient Pre-hospital Living Situation: lives with family  Patient Pre-hospital Level of Mobility: ambulatory  Patient Pre-hospital Diet Restrictions: none  Substance Use History:   History   Alcohol Use No     Comment: OCCASIONAL ALCOHOL USE IN ALL SCRIPTS     History   Smoking Status    Former Smoker   Smokeless Tobacco    Never Used     History   Drug Use No       Family History:    non-contributory    Physical Exam:     Vitals:   Blood Pressure: (!) 180/83 (02/23/18 2210)  Pulse: 92 (02/23/18 2210)  Temperature: 98 6 °F (37 °C) (02/23/18 1956)  Temp Source: Oral (02/23/18 1956)  Respirations: 20 (02/23/18 2210)  Weight - Scale: 86 2 kg (190 lb) (02/23/18 1744)  SpO2: 96 % (02/23/18 2210)    Physical Exam   Constitutional: She is oriented to person, place, and time  She appears well-developed and well-nourished  No distress  HENT:   Head: Normocephalic and atraumatic  Eyes: Conjunctivae are normal  Pupils are equal, round, and reactive to light  Neck: Normal range of motion  Neck supple  No JVD present  No tracheal deviation present   No thyromegaly present  Cardiovascular: Normal rate and regular rhythm  Pulmonary/Chest: Effort normal  No respiratory distress  She has no wheezes  Decreased breath sounds in bases   Abdominal: Soft  Bowel sounds are normal  She exhibits no distension and no mass  There is no tenderness  There is no rebound and no guarding  Musculoskeletal: Normal range of motion  She exhibits no edema, tenderness or deformity  Lymphadenopathy:     She has no cervical adenopathy  Neurological: She is alert and oriented to person, place, and time  Skin: Skin is warm and dry  No rash noted  She is not diaphoretic  No erythema  No pallor  Psychiatric: She has a normal mood and affect  Her behavior is normal    Vitals reviewed  Additional Data:     Lab Results: I have personally reviewed pertinent reports  Results from last 7 days  Lab Units 02/23/18  1823   WBC Thousand/uL 13 29*   HEMOGLOBIN g/dL 12 9   HEMATOCRIT % 38 5   PLATELETS Thousands/uL 266   NEUTROS PCT % 88*   LYMPHS PCT % 5*   MONOS PCT % 6   EOS PCT % 1       Results from last 7 days  Lab Units 02/23/18  1823   SODIUM mmol/L 138   POTASSIUM mmol/L 3 0*   CHLORIDE mmol/L 99*   CO2 mmol/L 26   BUN mg/dL 9   CREATININE mg/dL 0 64   CALCIUM mg/dL 9 0   TOTAL PROTEIN g/dL 7 6   BILIRUBIN TOTAL mg/dL 0 54   ALK PHOS U/L 219*   ALT U/L 33   AST U/L 20   GLUCOSE RANDOM mg/dL 290*       Results from last 7 days  Lab Units 02/23/18  1823   INR  0 96       Imaging: I have personally reviewed pertinent reports  Cta Ed Chest Pe Study    Result Date: 2/23/2018  Narrative: CTA - CHEST WITH IV CONTRAST - PULMONARY ANGIOGRAM INDICATION: Dyspnea  Hypoxia  COMPARISON: None  TECHNIQUE: CTA examination of the chest was performed using angiographic technique according to a protocol specifically tailored to evaluate for pulmonary embolism  Axial, sagittal, and coronal 2D reformatted images were created from the source data and  submitted for interpretation  In addition, coronal 3D MIP postprocessing was performed on the acquisition scanner  Radiation dose length product (DLP) for this visit:  458 mGy-cm   This examination, like all CT scans performed in the [facility], was performed utilizing techniques to minimize radiation dose exposure, including the use of iterative reconstruction and automated exposure control  IV Contrast:  85 mL of iohexol (OMNIPAQUE)  FINDINGS: PULMONARY ARTERIAL TREE:  No pulmonary embolus is seen  LUNGS:  Patchy airspace opacity left lower lobe with a nodular appearance suggestive of pneumonia  Small areas of groundglass opacity mainly left upper lobe also likely a 2nd area of pneumonitis  PLEURA:  Small bilateral pleural effusions are noted  HEART/AORTA:  Unremarkable for patient's age  MEDIASTINUM AND FORTUNATO:  Unremarkable  CHEST WALL AND LOWER NECK:   Unremarkable  VISUALIZED STRUCTURES IN THE UPPER ABDOMEN:  Unremarkable  OSSEOUS STRUCTURES:  No acute fracture or destructive osseous lesion  Impression: No evidence of acute pulmonary wasn't  Left lower lobe pneumonia  Smaller patchy focus of pneumonia anteriorly left upper lobe  Small bilateral pleural effusions  Workstation performed: [id number]       EKG, Pathology, and Other Studies Reviewed on Admission:   · EKG: NSR    Allscripts / Epic Records Reviewed: Yes     ** Please Note: This note has been constructed using a voice recognition system   **

## 2018-02-24 NOTE — PROGRESS NOTES
Progress Note - Victoria Monzon 1960, 65 y o  female MRN: [id number]    Unit/Bed#: [address]02 Encounter: [id number]    Primary Care Provider: Clarence Galeas DO   Date and time admitted to hospital: 2/23/2018  5:42 PM        * Pneumonia of both lower lobes due to infectious organism   Assessment & Plan    · Continue Rocephin/Azithromycin  · Normal O2, continue to supplement for comfort as pt c/o SOB  · Monitor CBC/VS  · Await infectious workup  · Supportive treatment        Class 1 obesity due to excess calories with serious comorbidity and body mass index (BMI) of 33 0 to 33 9 in adult   Assessment & Plan    · With uncontrolled DM and HTN as comorbidities  · Obtain Nutrition consult  · Encourage lifestyle modifications        Type 2 diabetes mellitus with hyperglycemia, without long-term current use of insulin (HCC)   Assessment & Plan    · Uncontrolled with Hb A1C 9 5  · Encourage compliance  · Continue accuchecks, adjust insulin  · Diabetic diet        Esophageal reflux   Assessment & Plan    · Continue Protonix        Hypertensive urgency   Assessment & Plan    · Improved appropriately  · Secondary to poor outpatient compliance as patient herself admitted to  · Continue home medications and PRN, adjust regimen            VTE Pharmacologic Prophylaxis:   Pharmacologic: Enoxaparin (Lovenox)  Mechanical VTE Prophylaxis in Place: Yes    Patient Centered Rounds: I have performed bedside rounds with nursing staff today  Discussions with Specialists or Other Care Team Provider: SLIM    Education and Discussions with Family / Patient: Patient    Time Spent for Care: 30 minutes  More than 50% of total time spent on counseling and coordination of care as described above      Current Length of Stay: 1 day(s)    Current Patient Status: Inpatient   Certification Statement: The patient will continue to require additional inpatient hospital stay due to need for close monitoring    Discharge Plan: TBD    Code Status: Level 1 - Full Code      Subjective:   Patient seen and examined  She is complaining of shortness of breath  Oxygen saturation was normal   She is not appear dyspneic and is not using accessory muscles  She is speaking full sentences  She denies chest pain or palpitations  She states that her cough is improved  She denies abdominal pain, nausea, vomiting, diarrhea or constipation  Her appetite is normal     Objective:     Vitals:   Temp (24hrs), Av 6 °F (37 6 °C), Min:98 6 °F (37 °C), Max:100 3 °F (37 9 °C)    HR:  [] 85  Resp:  [14-22] 20  BP: (166-206)/(77-97) 166/79  SpO2:  [95 %-99 %] 95 %  Body mass index is 33 66 kg/m²  Input and Output Summary (last 24 hours): Intake/Output Summary (Last 24 hours) at 18 1820  Last data filed at 18 1007   Gross per 24 hour   Intake             1300 ml   Output                0 ml   Net             1300 ml       Physical Exam:     Physical Exam   Constitutional: She is oriented to person, place, and time  No distress  Overweight   HENT:   Head: Normocephalic and atraumatic  Eyes: Conjunctivae are normal    Neck: No JVD present  Cardiovascular: Normal rate and regular rhythm  Pulmonary/Chest: Effort normal  No respiratory distress  She has no wheezes  She has rales (Mild, bilateral)  Abdominal: Soft  She exhibits no distension  There is no tenderness  There is no guarding  Musculoskeletal: She exhibits no edema  Neurological: She is alert and oriented to person, place, and time  Skin: Skin is warm and dry  Psychiatric: She has a normal mood and affect         Additional Data:     Labs:      Results from last 7 days  Lab Units 18  0656 18  1823   WBC Thousand/uL 8 01 13 29*   HEMOGLOBIN g/dL 11 0* 12 9   HEMATOCRIT % 33 8* 38 5   PLATELETS Thousands/uL 245 266   NEUTROS PCT %  --  88*   LYMPHS PCT %  --  5*   MONOS PCT %  --  6   EOS PCT %  --  1       Results from last 7 days  Lab Units 02/24/18  0656 02/23/18  1823   SODIUM mmol/L 139 138   POTASSIUM mmol/L 4 0 3 0*   CHLORIDE mmol/L 102 99*   CO2 mmol/L 28 26   BUN mg/dL 10 9   CREATININE mg/dL 0 82 0 64   CALCIUM mg/dL 8 4 9 0   TOTAL PROTEIN g/dL  --  7 6   BILIRUBIN TOTAL mg/dL  --  0 54   ALK PHOS U/L  --  219*   ALT U/L  --  33   AST U/L  --  20   GLUCOSE RANDOM mg/dL 237* 290*       Results from last 7 days  Lab Units 02/23/18  1823   INR  0 96       * I Have Reviewed All Lab Data Listed Above  * Additional Pertinent Lab Tests Reviewed:  [address] Admission Reviewed    Imaging:    Imaging Reports Reviewed Today Include: chest xray    Recent Cultures (last 7 days):           Last 24 Hours Medication List:     Current Facility-Administered Medications:  acetaminophen 650 mg Oral Q6H PRN Loanushkaie Crass, PA-C   ALPRAZolam 0 5 mg Oral TID PRN Katarina Schaffer, PA-C   aluminum-magnesium hydroxide-simethicone 30 mL Oral Q6H PRN Loie Crass, PA-C   amLODIPine 10 mg Oral Daily Lollie Crass, PA-C   aspirin 81 mg Oral Daily Loie [facility], PA-C   atorvastatin 40 mg Oral Daily With [employer], PA-MORELIA   cefTRIAXone 1,000 mg Intravenous Q24H Katarina Schaffer, PA-MORELIA   And       azithromycin 500 mg Intravenous Q24H Katarina Schaffer, PA-MORELIA   benzonatate 100 mg Oral TID PRN Jina Espinoza MD   enoxaparin 40 mg Subcutaneous Daily evan Schaffer, KETURAH   guaiFENesin 600 mg Oral Q12H [address] Jina Espinoza MD   hydrALAZINE 5 mg Intravenous Q6H PRN Jina Espinoza MD   insulin glargine 15 Units Subcutaneous HS Jina Espinoza MD   insulin lispro 1-5 Units Subcutaneous HS Katarina Schaffer PA-C   insulin lispro 2-12 Units Subcutaneous TID AC Sandie Menezes PA-C   insulin lispro 3 Units Subcutaneous TID With Meals Jnia Espinoza MD   levalbuterol 1 25 mg Nebulization TID Jina Espinoza MD   lisinopril 40 mg Oral Daily Katarina Schaffer PA-C   metoprolol succinate 100 mg Oral Daily Katarina Schaffer PA-C   ondansetron 4 mg Intravenous Q6H PRN Katia Maria PA-C   pantoprazole 40 mg Oral Early Morning Katia Maria PA-C   sertraline 100 mg Oral Daily Katia Maria PA-C   sertraline 50 mg Oral HS Katia Maria PA-C   sodium chloride 3 mL Nebulization TID Yaya Ceron MD        Today, Patient Was Seen By: Marlen Bowser MD    ** Please Note: Dictation voice to text software may have been used in the creation of this document   **

## 2018-02-24 NOTE — ASSESSMENT & PLAN NOTE
· Continue Rocephin/Azithromycin  · Normal O2, continue to supplement for comfort as pt c/o SOB  · Monitor CBC/VS  · Await infectious workup  · Supportive treatment

## 2018-02-24 NOTE — ASSESSMENT & PLAN NOTE
· Uncontrolled with Hb A1C 9 5  · Encourage compliance  · Continue accuchecks, adjust insulin  · Diabetic diet

## 2018-02-24 NOTE — ASSESSMENT & PLAN NOTE
· Improved appropriately  · Secondary to poor outpatient compliance as patient herself admitted to  · Continue home medications and PRN, adjust regimen

## 2018-02-24 NOTE — ASSESSMENT & PLAN NOTE
· With uncontrolled DM and HTN as comorbidities  · Obtain Nutrition consult  · Encourage lifestyle modifications

## 2018-02-25 LAB
ANION GAP SERPL CALCULATED.3IONS-SCNC: 11 MMOL/L (ref 4–13)
ATRIAL RATE: 87 BPM
BASOPHILS # BLD AUTO: 0.03 THOUSANDS/ΜL (ref 0–0.1)
BASOPHILS NFR BLD AUTO: 0 % (ref 0–1)
BUN SERPL-MCNC: 12 MG/DL (ref 5–25)
CALCIUM SERPL-MCNC: 8.5 MG/DL (ref 8.3–10.1)
CHLORIDE SERPL-SCNC: 101 MMOL/L (ref 100–108)
CO2 SERPL-SCNC: 24 MMOL/L (ref 21–32)
CREAT SERPL-MCNC: 0.61 MG/DL (ref 0.6–1.3)
EOSINOPHIL # BLD AUTO: 0 THOUSAND/ΜL (ref 0–0.61)
EOSINOPHIL NFR BLD AUTO: 0 % (ref 0–6)
ERYTHROCYTE [DISTWIDTH] IN BLOOD BY AUTOMATED COUNT: 14.1 % (ref 11.6–15.1)
FLUAV AG SPEC QL: NORMAL
FLUBV AG SPEC QL: NORMAL
GFR SERPL CREATININE-BSD FRML MDRD: 102 ML/MIN/1.73SQ M
GLUCOSE SERPL-MCNC: 168 MG/DL (ref 65–140)
GLUCOSE SERPL-MCNC: 218 MG/DL (ref 65–140)
GLUCOSE SERPL-MCNC: 255 MG/DL (ref 65–140)
GLUCOSE SERPL-MCNC: 289 MG/DL (ref 65–140)
GLUCOSE SERPL-MCNC: 297 MG/DL (ref 65–140)
GLUCOSE SERPL-MCNC: 316 MG/DL (ref 65–140)
GLUCOSE SERPL-MCNC: 423 MG/DL (ref 65–140)
HCT VFR BLD AUTO: 39.2 % (ref 34.8–46.1)
HGB BLD-MCNC: 12.9 G/DL (ref 11.5–15.4)
LYMPHOCYTES # BLD AUTO: 0.77 THOUSANDS/ΜL (ref 0.6–4.47)
LYMPHOCYTES NFR BLD AUTO: 6 % (ref 14–44)
MCH RBC QN AUTO: 28.6 PG (ref 26.8–34.3)
MCHC RBC AUTO-ENTMCNC: 32.9 G/DL (ref 31.4–37.4)
MCV RBC AUTO: 87 FL (ref 82–98)
MONOCYTES # BLD AUTO: 1.1 THOUSAND/ΜL (ref 0.17–1.22)
MONOCYTES NFR BLD AUTO: 8 % (ref 4–12)
NEUTROPHILS # BLD AUTO: 12.2 THOUSANDS/ΜL (ref 1.85–7.62)
NEUTS SEG NFR BLD AUTO: 86 % (ref 43–75)
NRBC BLD AUTO-RTO: 0 /100 WBCS
P AXIS: 74 DEGREES
PLATELET # BLD AUTO: 199 THOUSANDS/UL (ref 149–390)
PMV BLD AUTO: 9.2 FL (ref 8.9–12.7)
POTASSIUM SERPL-SCNC: 3.9 MMOL/L (ref 3.5–5.3)
PR INTERVAL: 125 MS
QRS AXIS: 60 DEGREES
QRSD INTERVAL: 83 MS
QT INTERVAL: 379 MS
QTC INTERVAL: 456 MS
RBC # BLD AUTO: 4.51 MILLION/UL (ref 3.81–5.12)
RSV B RNA SPEC QL NAA+PROBE: NORMAL
SODIUM SERPL-SCNC: 136 MMOL/L (ref 136–145)
T WAVE AXIS: 72 DEGREES
TROPONIN I SERPL-MCNC: <0.02 NG/ML
TROPONIN I SERPL-MCNC: <0.02 NG/ML
VENTRICULAR RATE: 87 BPM
WBC # BLD AUTO: 14.1 THOUSAND/UL (ref 4.31–10.16)

## 2018-02-25 PROCEDURE — C9113 INJ PANTOPRAZOLE SODIUM, VIA: HCPCS | Performed by: NURSE PRACTITIONER

## 2018-02-25 PROCEDURE — 80048 BASIC METABOLIC PNL TOTAL CA: CPT | Performed by: FAMILY MEDICINE

## 2018-02-25 PROCEDURE — 94003 VENT MGMT INPAT SUBQ DAY: CPT

## 2018-02-25 PROCEDURE — 93005 ELECTROCARDIOGRAM TRACING: CPT

## 2018-02-25 PROCEDURE — 84484 ASSAY OF TROPONIN QUANT: CPT | Performed by: INTERNAL MEDICINE

## 2018-02-25 PROCEDURE — 0BH18EZ INSERTION OF ENDOTRACHEAL AIRWAY INTO TRACHEA, VIA NATURAL OR ARTIFICIAL OPENING ENDOSCOPIC: ICD-10-PCS | Performed by: INTERNAL MEDICINE

## 2018-02-25 PROCEDURE — 5A1935Z RESPIRATORY VENTILATION, LESS THAN 24 CONSECUTIVE HOURS: ICD-10-PCS | Performed by: INTERNAL MEDICINE

## 2018-02-25 PROCEDURE — 84484 ASSAY OF TROPONIN QUANT: CPT | Performed by: NURSE PRACTITIONER

## 2018-02-25 PROCEDURE — 93010 ELECTROCARDIOGRAM REPORT: CPT | Performed by: INTERNAL MEDICINE

## 2018-02-25 PROCEDURE — 99292 CRITICAL CARE ADDL 30 MIN: CPT | Performed by: INTERNAL MEDICINE

## 2018-02-25 PROCEDURE — 94640 AIRWAY INHALATION TREATMENT: CPT

## 2018-02-25 PROCEDURE — 99291 CRITICAL CARE FIRST HOUR: CPT | Performed by: INTERNAL MEDICINE

## 2018-02-25 PROCEDURE — 85025 COMPLETE CBC W/AUTO DIFF WBC: CPT | Performed by: FAMILY MEDICINE

## 2018-02-25 PROCEDURE — 31500 INSERT EMERGENCY AIRWAY: CPT | Performed by: INTERNAL MEDICINE

## 2018-02-25 PROCEDURE — 82948 REAGENT STRIP/BLOOD GLUCOSE: CPT

## 2018-02-25 RX ORDER — LABETALOL HYDROCHLORIDE 5 MG/ML
10 INJECTION, SOLUTION INTRAVENOUS ONCE
Status: COMPLETED | OUTPATIENT
Start: 2018-02-25 | End: 2018-02-25

## 2018-02-25 RX ORDER — LABETALOL HYDROCHLORIDE 5 MG/ML
INJECTION, SOLUTION INTRAVENOUS
Status: COMPLETED
Start: 2018-02-25 | End: 2018-02-25

## 2018-02-25 RX ORDER — ALPRAZOLAM 0.5 MG/1
0.5 TABLET ORAL
Status: DISCONTINUED | OUTPATIENT
Start: 2018-02-25 | End: 2018-03-02 | Stop reason: HOSPADM

## 2018-02-25 RX ORDER — FENTANYL CITRATE 50 UG/ML
25 INJECTION, SOLUTION INTRAMUSCULAR; INTRAVENOUS
Status: DISCONTINUED | OUTPATIENT
Start: 2018-02-25 | End: 2018-03-02 | Stop reason: HOSPADM

## 2018-02-25 RX ORDER — FENTANYL CITRATE 50 UG/ML
INJECTION, SOLUTION INTRAMUSCULAR; INTRAVENOUS
Status: COMPLETED
Start: 2018-02-25 | End: 2018-02-25

## 2018-02-25 RX ORDER — PROPOFOL 10 MG/ML
INJECTION, EMULSION INTRAVENOUS
Status: COMPLETED
Start: 2018-02-25 | End: 2018-02-25

## 2018-02-25 RX ORDER — LEVALBUTEROL 1.25 MG/.5ML
1.25 SOLUTION, CONCENTRATE RESPIRATORY (INHALATION)
Status: DISCONTINUED | OUTPATIENT
Start: 2018-02-25 | End: 2018-02-26

## 2018-02-25 RX ORDER — CHLORHEXIDINE GLUCONATE 0.12 MG/ML
15 RINSE ORAL EVERY 12 HOURS SCHEDULED
Status: DISCONTINUED | OUTPATIENT
Start: 2018-02-25 | End: 2018-03-02 | Stop reason: HOSPADM

## 2018-02-25 RX ORDER — ACETAMINOPHEN 325 MG/1
650 TABLET ORAL EVERY 6 HOURS PRN
Status: DISCONTINUED | OUTPATIENT
Start: 2018-02-25 | End: 2018-03-02 | Stop reason: HOSPADM

## 2018-02-25 RX ORDER — PANTOPRAZOLE SODIUM 40 MG/1
40 INJECTION, POWDER, FOR SOLUTION INTRAVENOUS ONCE
Status: COMPLETED | OUTPATIENT
Start: 2018-02-25 | End: 2018-02-25

## 2018-02-25 RX ORDER — PROPOFOL 10 MG/ML
5-50 INJECTION, EMULSION INTRAVENOUS
Status: DISCONTINUED | OUTPATIENT
Start: 2018-02-25 | End: 2018-02-26

## 2018-02-25 RX ADMIN — LABETALOL HYDROCHLORIDE 10 MG: 5 INJECTION, SOLUTION INTRAVENOUS at 06:16

## 2018-02-25 RX ADMIN — ENOXAPARIN SODIUM 40 MG: 40 INJECTION SUBCUTANEOUS at 08:01

## 2018-02-25 RX ADMIN — FENTANYL CITRATE 25 MCG: 50 INJECTION INTRAMUSCULAR; INTRAVENOUS at 00:28

## 2018-02-25 RX ADMIN — CHLORHEXIDINE GLUCONATE 15 ML: 1.2 RINSE ORAL at 20:57

## 2018-02-25 RX ADMIN — PROPOFOL 10 MCG/KG/MIN: 10 INJECTION, EMULSION INTRAVENOUS at 00:27

## 2018-02-25 RX ADMIN — IPRATROPIUM BROMIDE 0.5 MG: 0.5 SOLUTION RESPIRATORY (INHALATION) at 13:55

## 2018-02-25 RX ADMIN — IPRATROPIUM BROMIDE 0.5 MG: 0.5 SOLUTION RESPIRATORY (INHALATION) at 19:27

## 2018-02-25 RX ADMIN — HYDRALAZINE HYDROCHLORIDE 5 MG: 20 INJECTION INTRAMUSCULAR; INTRAVENOUS at 04:19

## 2018-02-25 RX ADMIN — LEVALBUTEROL HYDROCHLORIDE 1.25 MG: 1.25 SOLUTION, CONCENTRATE RESPIRATORY (INHALATION) at 13:55

## 2018-02-25 RX ADMIN — SODIUM CHLORIDE 1000 ML: 0.9 INJECTION, SOLUTION INTRAVENOUS at 05:13

## 2018-02-25 RX ADMIN — IPRATROPIUM BROMIDE 0.5 MG: 0.5 SOLUTION RESPIRATORY (INHALATION) at 01:42

## 2018-02-25 RX ADMIN — CHLORHEXIDINE GLUCONATE 15 ML: 1.2 RINSE ORAL at 08:01

## 2018-02-25 RX ADMIN — LEVALBUTEROL HYDROCHLORIDE 1.25 MG: 1.25 SOLUTION, CONCENTRATE RESPIRATORY (INHALATION) at 07:24

## 2018-02-25 RX ADMIN — IPRATROPIUM BROMIDE 0.5 MG: 0.5 SOLUTION RESPIRATORY (INHALATION) at 07:24

## 2018-02-25 RX ADMIN — LABETALOL HYDROCHLORIDE 10 MG: 5 INJECTION, SOLUTION INTRAVENOUS at 05:29

## 2018-02-25 RX ADMIN — INSULIN LISPRO 2 UNITS: 100 INJECTION, SOLUTION INTRAVENOUS; SUBCUTANEOUS at 05:32

## 2018-02-25 RX ADMIN — CEFTRIAXONE 1000 MG: 1 INJECTION, SOLUTION INTRAVENOUS at 20:57

## 2018-02-25 RX ADMIN — LEVALBUTEROL HYDROCHLORIDE 1.25 MG: 1.25 SOLUTION, CONCENTRATE RESPIRATORY (INHALATION) at 01:42

## 2018-02-25 RX ADMIN — HYDRALAZINE HYDROCHLORIDE 5 MG: 20 INJECTION INTRAMUSCULAR; INTRAVENOUS at 17:22

## 2018-02-25 RX ADMIN — LEVALBUTEROL HYDROCHLORIDE 1.25 MG: 1.25 SOLUTION, CONCENTRATE RESPIRATORY (INHALATION) at 19:27

## 2018-02-25 RX ADMIN — PANTOPRAZOLE SODIUM 40 MG: 40 INJECTION, POWDER, FOR SOLUTION INTRAVENOUS at 03:00

## 2018-02-25 RX ADMIN — CEFTRIAXONE 1000 MG: 1 INJECTION, SOLUTION INTRAVENOUS at 00:41

## 2018-02-25 RX ADMIN — ACETAMINOPHEN 650 MG: 325 TABLET, FILM COATED ORAL at 12:20

## 2018-02-25 RX ADMIN — INSULIN LISPRO 2 UNITS: 100 INJECTION, SOLUTION INTRAVENOUS; SUBCUTANEOUS at 12:25

## 2018-02-25 RX ADMIN — PROPOFOL 10 MCG/KG/MIN: 10 INJECTION, EMULSION INTRAVENOUS at 07:56

## 2018-02-25 RX ADMIN — FENTANYL CITRATE 25 MCG: 50 INJECTION INTRAMUSCULAR; INTRAVENOUS at 03:07

## 2018-02-25 RX ADMIN — ALPRAZOLAM 0.5 MG: 0.5 TABLET ORAL at 20:57

## 2018-02-25 RX ADMIN — HYDRALAZINE HYDROCHLORIDE 5 MG: 20 INJECTION INTRAMUSCULAR; INTRAVENOUS at 10:32

## 2018-02-25 RX ADMIN — INSULIN LISPRO 3 UNITS: 100 INJECTION, SOLUTION INTRAVENOUS; SUBCUTANEOUS at 00:47

## 2018-02-25 RX ADMIN — AZITHROMYCIN MONOHYDRATE 500 MG: 500 INJECTION, POWDER, LYOPHILIZED, FOR SOLUTION INTRAVENOUS at 20:57

## 2018-02-25 NOTE — ANESTHESIA PROCEDURE NOTES
Emergent Intubation  Date/Time: 2/24/2018 11:05 PM  Urgency: emergent    Airway not difficult    General Information and Staff    Patient location during procedure: ICU  Resident/CRNA: Orly Sebastian  Performed: resident/CRNA     Consent for Airway (if performed for an anesthetic, see related documentation for consents)  Patient identity confirmed: arm band  Consent: The procedure was performed in an emergent situation  Verbal consent not obtained  Written consent not obtained    Risks and benefits: risks, benefits and alternatives were not discussed      Indications and Patient Condition  Indications for airway management: respiratory distress  Spontaneous ventilation: present  Preoxygenated: yes  Patient position: sniffing  MILS not maintained throughout  Mask difficulty assessment: 0 - not attempted    Final Airway Details  Final airway type: endotracheal airway      Successful airway: cuffed and ETT  Cuffed: yes   Successful intubation technique: video laryngoscopy  Blade: GlideScope  Blade size: #3  ETT size: 8 0 mm  Cormack-Lehane Classification: grade I - full view of glottis  Placement verified by: chest auscultation, capnometry and radiography   Number of attempts at approach: 1  Ventilation between attempts: none

## 2018-02-25 NOTE — PROCEDURES
Intubation  Date/Time: 2/25/2018 2:15 AM  Performed by: Geni Hameed  Authorized by: Geni Hameed     Patient location:  Bedside  Other Assisting Provider: Yes (comment)    Consent:     Consent obtained:  Emergent situation  Universal protocol:     Immediately prior to procedure, a time out was called: yes      Patient identity confirmed:  Arm band  Pre-procedure details:     Patient status:  Unresponsive    Mallampati score:  3    Pretreatment medications:  Etomidate    Paralytics:  None  Indications:     Indications for intubation: respiratory failure and airway protection    Procedure details:     Preoxygenation:  Bag valve mask    CPR in progress: no      Intubation method:  Oral    Oral intubation technique:  Glidescope    Laryngoscope blade: Mac 3    Tube size (mm):  7 0    Tube type:  Cuffed    Number of attempts:  2    Ventilation between attempts: yes      Tube visualized through cords: yes    Placement assessment:     ETT to lip:  24    Tube secured with:  ETT sparrow    Breath sounds:  Equal    Placement verification: chest rise, CXR verification and equal breath sounds      CXR findings:  ETT in proper place  Post-procedure details:     Patient tolerance of procedure:   Tolerated well, no immediate complications  Comments:      Thor Lyons

## 2018-02-25 NOTE — PROGRESS NOTES
Progress Note - Critical Care   Stacie Ye 65 y o  female MRN: [id number]  Unit/Bed#: ICU 02 Encounter: [id number]    Assessment/Plan:  1  Acute encephalopathy, cause unknown  · Patient found to be hypoxic, obtunded, incontinent of urine and stool while sitting up in bed  Prior to this episode patient was anxious but alert and oriented  · Serial neuro exams  · STAT head CT reveals hyperdensity of R MCA, without acute changes  · Continue propofol as needed for sedation while on vent, will give sedation break for reassessment at change of shift     2  Acute hypoxic respiratory failure multifactorial d/t change in mental status, b/l pneumonia  · Required emergent intubation following transfer to ICU for protection of airway  CXR shows b/l airspace disease with b/l pleural effusions  · Continue mechanical ventilation  · Vent settings adjusted per ABG results  · Bronchodilators ATC  · Aggressive pulmonary toileting  · Trend troponins, EKG    3  B/L pneumonia, community acquired  · Continue rocephin, azithromycin  · Strep, legionella negative  · Awaiting results of flu, sputum culture    4  Hypertension  · Patient hypertensive throughout rapid response, however currently normotensive following propofol  · Will hold antihypertensive meds while on sedation  · Hydralazine prn     5  Diabetes mellitus, type 2  · Accu checks Q6 hours with SSI coverage    6  History of stroke, 2015  · 2015 head MRI revealed  1  Acute/subacute infarction in the left frontal corona radiata  2  Cerebral white matter disease, likely a sequela of microangiopathy  · 2015 Neck MRA No hemodynamically significant stenosis of the proximal internal carotid arteries by NASCET criteria  2  No flow gap to suggest high-grade stenosis in the major intracranial circulation  3  Mild stenosis involving the middle cerebral artery branches bilaterally  4   Infundibula versus small aneurysms arising from the proximal A2 segment of the left anterior cerebral artery and distal M1 segment of the right middle cerebral artery    7  Hyperlipidemia  · Will continue lipids when able to take po    8  Esophageal reflux  · Continue protonix      Critical Care Time: 59 minutes  Documented critical care time excludes any procedures documented elsewhere  It also excludes any family updates    _____________________________________________________________________    HPI/24hr events:   Patient transferred to ICU from  following a rapid response d/t hypoxia  Patient found by bedside RN to be hypoxic with oxygen saturation in the 50s  Prior to this episode, patient was noted to be anxious, but alert and oriented  Upon initial assessment during rapid response, patient was noted to be obtunded with eyes open, without spontaneous movement, no verbalization, audible coarse respiratory sounds, incontinent of urine and feces  Her oral airway was suctioned and she was immediately placed on 100% oxygen with improvement of oxygen saturation of 100%, with protection of her airway and adequate respirations  Patient remained unresponsive, she was placed on the cardiac monitor and found to be in a sinus tachycardia in the 110s  An EKG showed no ST elevations  Peripheral IVS were placed, a fluid bolus was given and labs were drawn  Shortly after transfer to ICU patient was no longer protecting her airway and intubated by anesthesia without difficulty  Dr Vernon Napoles from neurology was consulted regarding her change in neurological status  A Head CT scan was obtained and revealed hypodensity of the right MCA, which was also noted on a 2015 MRA  Following patient transport from CT scan, patient began to follow simple commands and open eyes spontaneously  Patient's daughter updated on events above and came to bedside       Medications:    Current Facility-Administered Medications:  azithromycin 500 mg Intravenous Q24H ALEX Tapia    cefTRIAXone 1,000 mg Intravenous Q24H KETURAH Lopez Rate: Stopped (02/25/18 0120)   chlorhexidine 15 mL Swish & Spit Q12H [address] Wesley Ozzie, CRNP    enoxaparin 40 mg Subcutaneous Daily Adriel Lopez PA-C    fentanyl citrate (PF) 25 mcg Intravenous Q1H PRN Sierraville Ozzie, CRNP    hydrALAZINE 5 mg Intravenous Q6H PRN Francisco Funk MD    insulin lispro 1-5 Units Subcutaneous Q6H [address] Sierraville Ozzie, CRNP    ipratropium 0 5 mg Nebulization Q6H Sierraville Ozzie, CRNP    levalbuterol 1 25 mg Nebulization Q6H Sierraville Ozzie, CRNP    propofol 5-50 mcg/kg/min Intravenous Titrated Sierraville Ozzie, CRNP Last Rate: 10 mcg/kg/min (02/25/18 0756)         propofol 5-50 mcg/kg/min Last Rate: 10 mcg/kg/min (02/25/18 0756)         Physical exam:  Vitals: Body mass index is 32 69 kg/m²  Blood pressure 166/76, pulse 96, temperature (!) 102 2 °F (39 °C), temperature source Temporal, resp  rate 19, height 5' 3" (1 6 m), weight 83 7 kg (184 lb 8 4 oz), SpO2 96 %  ,  Temp  Min: 98 °F (36 7 °C)  Max: 102 2 °F (39 °C)  IBW: 52 4 kg    SpO2: 96 %  SpO2 Activity: At Rest  O2 Device: Nasal cannula      Intake/Output Summary (Last 24 hours) at 02/25/18 0801  Last data filed at 02/25/18 0600   Gross per 24 hour   Intake          1099 68 ml   Output             1705 ml   Net          -605 32 ml       Invasive/non-invasive ventilation settings:   Respiratory    Lab Data (Last 4 hours)    None         O2/Vent Data (Last 4 hours)      02/25 0551 02/25 0700         Vent Mode AC/VC AC/VC      Resp Rate (BPM) (BPM) 16 16      Vt (mL) (mL) 450 450      FIO2 (%) (%) 50 40      PEEP (cmH2O) (cmH2O) 5 5      MV 11 2 10 5                Invasive Devices     Peripheral Intravenous Line            Peripheral IV 02/23/18 Right Antecubital 1 day    Peripheral IV 02/25/18 Left Wrist less than 1 day    Peripheral IV 02/25/18 Right;Upper Antecubital less than 1 day          Drain            NG/OG/Enteral Tube Orogastric 16 Fr less than 1 day    Urethral Catheter Double-lumen 16 Fr  less than 1 day          Airway            ETT Cuffed 7 mm less than 1 day                  Physical Exam:  Gen: intubated, sedated  HEENT: normocephalic, atraumatic, oropharynx clear  Neck: no JVD, no lymphadenopathy, trachea midline, ETT patent  Chest: lungs sounds rhoncherous throughout, equal  Cor: audible S1,S2, no edema  Abd: obese, soft, non distended  Ext: weakly moves extremities, equally  Neuro: PERRLA 4mm, b/l, opens eyes, follows simple commands  Skin: warm, dry, intact      Diagnostic Data:  Lab: I have personally reviewed pertinent lab results     CBC:     Results from last 7 days  Lab Units 02/25/18  0443 02/24/18  2252 02/24/18  0656   WBC Thousand/uL 14 10* 17 54* 8 01   HEMOGLOBIN g/dL 12 9 12 3 11 0*   HEMATOCRIT % 39 2 38 4 33 8*   PLATELETS Thousands/uL 199 341 245       CMP:     Results from last 7 days  Lab Units 02/25/18  0437 02/24/18  2252 02/24/18  0656 02/23/18  1823   SODIUM mmol/L 136 135* 139 138   POTASSIUM mmol/L 3 9 4 1 4 0 3 0*   CHLORIDE mmol/L 101 99* 102 99*   CO2 mmol/L 24 24 28 26   BUN mg/dL 12 12 10 9   CREATININE mg/dL 0 61 0 81 0 82 0 64   CALCIUM mg/dL 8 5 8 4 8 4 9 0   TOTAL PROTEIN g/dL  --   --   --  7 6   BILIRUBIN TOTAL mg/dL  --   --   --  0 54   ALK PHOS U/L  --   --   --  219*   ALT U/L  --   --   --  33   AST U/L  --   --   --  20   GLUCOSE RANDOM mg/dL 297* 488* 237* 290*     PT/INR:   Lab Results   Component Value Date    INR 1 11 02/24/2018   ,   Magnesium:     Results from last 7 days  Lab Units 02/24/18  2252   MAGNESIUM mg/dL 2 0     Phosphorous:     Results from last 7 days  Lab Units 02/24/18  2252   PHOSPHORUS mg/dL 4 4       Microbiology:    Results from last 7 days  Lab Units 02/23/18  1931 02/23/18  1719   STREP PNEUMONIAE ANTIGEN, URINE  Negative  --    LEGIONELLA URINARY ANTIGEN   --  Negative       Imaging:  CXR-B/L airspace disease  Head CT scan-no acute changes    Cardiac lab/EKG/telemetry/ECHO:   NSR    VTE Prophylaxis: enoxaparin    Code Status: Level 1 - Full Code    Tamanna Brandon, CRNP    Portions of the record may have been created with voice recognition software  Occasional wrong word or "sound a like" substitutions may have occurred due to the inherent limitations of voice recognition software  Read the chart carefully and recognize, using context, where substitutions have occurred

## 2018-02-25 NOTE — RAPID RESPONSE
Progress Note - Rapid Response   Annalise Simmons 65 y o  female MRN: [id number]    Time Called ( Time): 4504  Room#: 824  [id number] Time ( Time): 2236  Event End Time ( Time): 2750  MEWS score at time of Rapid Response:   Primary reason for call: Acute change in SPO2  Interventions:  Airway/Breathing:  Suctioned  Circulation: IV Fluid Bolus  Other Treatments: N/A       Assessment:   Hypoxia  Acute metabolic encephalopathy  Hypertension    Plan:   · Iniatted 100% non rebreather  · Airway suctioned for thick white secretions  · BMP, cardiac enzymes, EKG, Head CT  · Consult with neurology, Dr Anupam Danielson  · Transfer to ICU       HPI/Chief Complaint (Background/Situation): Annalise Simmons is a 65y o  year old female who presents with hypoxia, hypertension, mental status change as noted by the bedside nurse  See ICU transfer note for details  Historical Information   Past Medical History:   Diagnosis Date    Diabetes ([facility] [address] )     Esophageal reflux     28 APR 2015 RESOLVED    Hyperlipidemia     Hypertension     Stroke ([facility] [address] )     RESOLVED 08MAY 2017     History reviewed  No pertinent surgical history    Social History   History   Alcohol Use No     Comment: OCCASIONAL ALCOHOL USE IN ALL SCRIPTS     History   Drug Use No     History   Smoking Status    Former Smoker   Smokeless Tobacco    Never Used     Family History: non-contributory    Meds/Allergies     Current Facility-Administered Medications:  [MAR Hold] acetaminophen 650 mg Oral Q6H PRN Gianni Corley PA-C   [MAR Hold] ALPRAZolam 0 5 mg Oral TID PRN Gianni Corley PA-C   [MAR Hold] aluminum-magnesium hydroxide-simethicone 30 mL Oral Q6H PRN Gianni Corley PA-C   [MAR Hold] amLODIPine 10 mg Oral Daily Gianni Corley PA-C   [MAR Hold] aspirin 81 mg Oral Daily Gianni Corley PA-C   [MAR Hold] atorvastatin 40 mg Oral Daily With KETURAH Gonzalez   [facility] Hold] cefTRIAXone 1,000 mg Intravenous Q24H Gianni Corley PA-C And       [MAR Hold] azithromycin 500 mg Intravenous Q24H Renée Mccann PA-C   [facility] Hold] benzonatate 100 mg Oral TID PRN Steve Sweeney MD   [MAR Hold] enoxaparin 40 mg Subcutaneous Daily Renée Mccann PA-C   [MAR Hold] guaiFENesin 600 mg Oral Q12H Conrad Shafer MD   [MAR Hold] hydrALAZINE 5 mg Intravenous Q6H PRN Steve Sweeney MD   [MAR Hold] insulin glargine 15 Units Subcutaneous HS Steve Sweeney MD   [MAR Hold] insulin lispro 1-5 Units Subcutaneous HS Renée Mccann PA-C   [facility] Hold] insulin lispro 2-12 Units Subcutaneous TID AC Sandie Menezes PA-C   [MAR Hold] insulin lispro 3 Units Subcutaneous TID With Cristiana Dimas MD   [facility] Hold] levalbuterol 1 25 mg Nebulization TID Steve Sweeney MD   [MAR Hold] lisinopril 40 mg Oral Daily Renée Mccann PA-C   [facility] Hold] metoprolol succinate 100 mg Oral Daily Renée Mccann PA-C   [facility] Hold] ondansetron 4 mg Intravenous Q6H PRN Renée Mccann PA-C   [MAR Hold] pantoprazole 40 mg Oral Early Morning Renée Mccann PA-C   [facility] Hold] sertraline 100 mg Oral Daily Renée Mccann PA-C   [MAR Hold] sertraline 50 mg Oral HS Renée Mccann PA-C   [MAR Hold] sodium chloride 3 mL Nebulization TID Steve Sweeney MD   [MAR Hold] zolpidem 5 mg Oral HS PRN Steve Sweeney MD            No Known Allergies    ROS: Negative   Physical Exam:  Gen: obtunded, no spontaneous movements, diaphoretic  HEENT:normocephalic,atraumatic, orophr  Neck:no JVD, no lymphadenopathy, trachea midline  Chest:lung sounds coarse, equal  Cor:audible S1,S2, no edema, peripheral pulses palpable  Abd:obese, non distended  Ext:no muscular tone, no spontaneous movement to central stimuli  Neuro:obtunded, PERRLA 5mm b/l  Skin: cool, clammy, bluish,       Intake/Output Summary (Last 24 hours) at 02/25/18 0017  Last data filed at 02/24/18 1007   Gross per 24 hour   Intake             1000 ml   Output                0 ml   Net             1000 ml       Respiratory    Lab Data (Last 4 hours)      02/24 2342            pH, Arterial       (!)7 261           pCO2, Arterial       (!)52 8           pO2, Arterial       (!)337 2           HCO3, Arterial       23 2           Base Excess, Arterial       -4 2                O2/Vent Data       02/24 2321   Most Recent         Vent Mode AC/VC  AC/VC      Resp Rate (BPM) (BPM) 16  16      Vt (mL) (mL) 400  400      FIO2 (%) (%) 100  100      PEEP (cmH2O) (cmH2O) 5  5      MV 12 1  12 1                Invasive Devices     Peripheral Intravenous Line            Peripheral IV 02/23/18 Right Antecubital 1 day                DIAGNOSTIC DATA:    Lab: I have personally reviewed pertinent lab results  CBC:     Results from last 7 days  Lab Units 02/24/18  2252   WBC Thousand/uL 17 54*   HEMOGLOBIN g/dL 12 3   HEMATOCRIT % 38 4   PLATELETS Thousands/uL 341     CMP:     Results from last 7 days  Lab Units 02/24/18  2252 02/24/18  0656 02/23/18  1823   SODIUM mmol/L 135* 139 138   POTASSIUM mmol/L 4 1 4 0 3 0*   CHLORIDE mmol/L 99* 102 99*   CO2 mmol/L 24 28 26   BUN mg/dL 12 10 9   CREATININE mg/dL 0 81 0 82 0 64   CALCIUM mg/dL 8 4 8 4 9 0   TOTAL PROTEIN g/dL  --   --  7 6   BILIRUBIN TOTAL mg/dL  --   --  0 54   ALK PHOS U/L  --   --  219*   ALT U/L  --   --  33   AST U/L  --   --  20   GLUCOSE RANDOM mg/dL 488* 237* 290*     PT/INR:   Lab Results   Component Value Date    INR 1 11 02/24/2018   ,   Magnesium: No results found for: MAG,   Phosphorous:   Lab Results   Component Value Date    PHOS 4 4 02/24/2018       Microbiology:  No results found for: Eduardo Held, SPUTUMCULTUR      OUTCOME:   Transferred to Critical Care Unit  Family notified of transfer: yes  Family member contacted: Eva River, daughter  Code Status: Level 1 - Full Code  Critical Care Time: Total Critical Care time spent 45 minutes excluding procedures, teaching and family updates

## 2018-02-26 LAB
ATRIAL RATE: 117 BPM
GLUCOSE SERPL-MCNC: 144 MG/DL (ref 65–140)
GLUCOSE SERPL-MCNC: 164 MG/DL (ref 65–140)
GLUCOSE SERPL-MCNC: 167 MG/DL (ref 65–140)
GLUCOSE SERPL-MCNC: 189 MG/DL (ref 65–140)
GLUCOSE SERPL-MCNC: 190 MG/DL (ref 65–140)
P AXIS: 60 DEGREES
PR INTERVAL: 134 MS
QRS AXIS: 63 DEGREES
QRSD INTERVAL: 84 MS
QT INTERVAL: 324 MS
QTC INTERVAL: 451 MS
T WAVE AXIS: 71 DEGREES
VENTRICULAR RATE: 117 BPM

## 2018-02-26 PROCEDURE — 99233 SBSQ HOSP IP/OBS HIGH 50: CPT | Performed by: INTERNAL MEDICINE

## 2018-02-26 PROCEDURE — 94640 AIRWAY INHALATION TREATMENT: CPT

## 2018-02-26 PROCEDURE — 82948 REAGENT STRIP/BLOOD GLUCOSE: CPT

## 2018-02-26 PROCEDURE — 93010 ELECTROCARDIOGRAM REPORT: CPT | Performed by: INTERNAL MEDICINE

## 2018-02-26 PROCEDURE — 99255 IP/OBS CONSLTJ NEW/EST HI 80: CPT | Performed by: NURSE PRACTITIONER

## 2018-02-26 PROCEDURE — 94760 N-INVAS EAR/PLS OXIMETRY 1: CPT

## 2018-02-26 RX ORDER — SERTRALINE HYDROCHLORIDE 100 MG/1
100 TABLET, FILM COATED ORAL DAILY
Status: DISCONTINUED | OUTPATIENT
Start: 2018-02-26 | End: 2018-03-02 | Stop reason: HOSPADM

## 2018-02-26 RX ORDER — AMLODIPINE BESYLATE 10 MG/1
10 TABLET ORAL DAILY
Status: DISCONTINUED | OUTPATIENT
Start: 2018-02-26 | End: 2018-03-02 | Stop reason: HOSPADM

## 2018-02-26 RX ORDER — LEVALBUTEROL 1.25 MG/.5ML
1.25 SOLUTION, CONCENTRATE RESPIRATORY (INHALATION)
Status: DISCONTINUED | OUTPATIENT
Start: 2018-02-27 | End: 2018-03-02 | Stop reason: HOSPADM

## 2018-02-26 RX ORDER — METOPROLOL SUCCINATE 50 MG/1
100 TABLET, EXTENDED RELEASE ORAL DAILY
Status: DISCONTINUED | OUTPATIENT
Start: 2018-02-26 | End: 2018-03-02 | Stop reason: HOSPADM

## 2018-02-26 RX ORDER — ATORVASTATIN CALCIUM 40 MG/1
40 TABLET, FILM COATED ORAL
Status: DISCONTINUED | OUTPATIENT
Start: 2018-02-26 | End: 2018-03-02 | Stop reason: HOSPADM

## 2018-02-26 RX ORDER — LISINOPRIL 20 MG/1
40 TABLET ORAL DAILY
Status: DISCONTINUED | OUTPATIENT
Start: 2018-02-26 | End: 2018-03-02 | Stop reason: HOSPADM

## 2018-02-26 RX ORDER — PANTOPRAZOLE SODIUM 40 MG/1
40 TABLET, DELAYED RELEASE ORAL
Status: DISCONTINUED | OUTPATIENT
Start: 2018-02-26 | End: 2018-03-02 | Stop reason: HOSPADM

## 2018-02-26 RX ORDER — ASPIRIN 81 MG/1
81 TABLET, CHEWABLE ORAL DAILY
Status: DISCONTINUED | OUTPATIENT
Start: 2018-02-26 | End: 2018-03-02 | Stop reason: HOSPADM

## 2018-02-26 RX ADMIN — AZITHROMYCIN MONOHYDRATE 500 MG: 500 INJECTION, POWDER, LYOPHILIZED, FOR SOLUTION INTRAVENOUS at 20:36

## 2018-02-26 RX ADMIN — IPRATROPIUM BROMIDE 0.5 MG: 0.5 SOLUTION RESPIRATORY (INHALATION) at 13:06

## 2018-02-26 RX ADMIN — HYDRALAZINE HYDROCHLORIDE 5 MG: 20 INJECTION INTRAMUSCULAR; INTRAVENOUS at 21:41

## 2018-02-26 RX ADMIN — ACETAMINOPHEN 650 MG: 325 TABLET, FILM COATED ORAL at 15:33

## 2018-02-26 RX ADMIN — CHLORHEXIDINE GLUCONATE 15 ML: 1.2 RINSE ORAL at 20:26

## 2018-02-26 RX ADMIN — METOPROLOL SUCCINATE 100 MG: 50 TABLET, EXTENDED RELEASE ORAL at 12:21

## 2018-02-26 RX ADMIN — ALPRAZOLAM 0.5 MG: 0.5 TABLET ORAL at 20:26

## 2018-02-26 RX ADMIN — LISINOPRIL 40 MG: 20 TABLET ORAL at 12:21

## 2018-02-26 RX ADMIN — IPRATROPIUM BROMIDE 0.5 MG: 0.5 SOLUTION RESPIRATORY (INHALATION) at 19:11

## 2018-02-26 RX ADMIN — LEVALBUTEROL HYDROCHLORIDE 1.25 MG: 1.25 SOLUTION, CONCENTRATE RESPIRATORY (INHALATION) at 19:11

## 2018-02-26 RX ADMIN — LEVALBUTEROL HYDROCHLORIDE 1.25 MG: 1.25 SOLUTION, CONCENTRATE RESPIRATORY (INHALATION) at 13:06

## 2018-02-26 RX ADMIN — ASPIRIN 81 MG 81 MG: 81 TABLET ORAL at 12:21

## 2018-02-26 RX ADMIN — HYDRALAZINE HYDROCHLORIDE 5 MG: 20 INJECTION INTRAMUSCULAR; INTRAVENOUS at 05:22

## 2018-02-26 RX ADMIN — IPRATROPIUM BROMIDE 0.5 MG: 0.5 SOLUTION RESPIRATORY (INHALATION) at 01:49

## 2018-02-26 RX ADMIN — LEVALBUTEROL HYDROCHLORIDE 1.25 MG: 1.25 SOLUTION, CONCENTRATE RESPIRATORY (INHALATION) at 07:12

## 2018-02-26 RX ADMIN — CEFTRIAXONE 1000 MG: 1 INJECTION, SOLUTION INTRAVENOUS at 21:45

## 2018-02-26 RX ADMIN — PANTOPRAZOLE SODIUM 40 MG: 40 TABLET, DELAYED RELEASE ORAL at 12:21

## 2018-02-26 RX ADMIN — ENOXAPARIN SODIUM 40 MG: 40 INJECTION SUBCUTANEOUS at 09:29

## 2018-02-26 RX ADMIN — SERTRALINE HYDROCHLORIDE 100 MG: 100 TABLET ORAL at 12:21

## 2018-02-26 RX ADMIN — IPRATROPIUM BROMIDE 0.5 MG: 0.5 SOLUTION RESPIRATORY (INHALATION) at 07:12

## 2018-02-26 RX ADMIN — AMLODIPINE BESYLATE 10 MG: 10 TABLET ORAL at 12:20

## 2018-02-26 RX ADMIN — LEVALBUTEROL HYDROCHLORIDE 1.25 MG: 1.25 SOLUTION, CONCENTRATE RESPIRATORY (INHALATION) at 01:49

## 2018-02-26 RX ADMIN — ATORVASTATIN CALCIUM 40 MG: 40 TABLET, FILM COATED ORAL at 15:35

## 2018-02-26 NOTE — PROGRESS NOTES
Progress Note - Critical Care   Jessica Dubon 65 y o  female MRN: [id number]  Unit/Bed#: ICU 02 Encounter: [id number]    Assessment/Plan:  1  Acute hypoxic respiratory failure multifactorial due to community acquired pneumonia and possible seizure  1  Patient was extubated yesterday and is currently on nasal cannula oxygenating well  2  Continue Ceftriaxone and azithromycin D#4  3  Out of bed as tolerated  PT/OT  2  Acute metabolic encephalopathy possible due to seizure: now improved  1  Consult neurology due to possible seizure on 2/25/2018  3  Hypertension  1  Restart home medications: lisinopril, norvasc and lopressor  2  Hydralazine prn  4  Diabetes Mellitus type II  1  accu checks and SSI  5  Hyperlipidemia  1  Continue lipitor  6  GERD  1  Continue Protonix  7  History of stroke      Critical Care Time:   Documented critical care time excludes any procedures documented elsewhere   It also excludes any family updates    _____________________________________________________________________    HPI/24hr events:   No events overnight    Medications:    Current Facility-Administered Medications:  acetaminophen 650 mg Oral Q6H PRN ALEX Tinoco    ALPRAZolam 0 5 mg Oral HS PRN Marcia Crane, ALEX    amLODIPine 10 mg Oral Daily Alex Norman, CRNP    aspirin 81 mg Oral Daily Alex Norman, CRNP    atorvastatin 40 mg Oral Daily With Shannon Company, CRNP    azithromycin 500 mg Intravenous Q24H ALEX Tinoco Last Rate: Stopped (02/25/18 2157)   cefTRIAXone 1,000 mg Intravenous Q24H Oni Gregorio PA-C Last Rate: Stopped (02/25/18 2127)   chlorhexidine 15 mL Swish & Spit Q12H [facility] OrALEX Butler    enoxaparin 40 mg Subcutaneous Daily Oni Gregorio PA-MORELIA    fentanyl citrate (PF) 25 mcg Intravenous Q1H PRN ALEX Doyle    hydrALAZINE 5 mg Intravenous Q6H PRN Kedar Powell MD    insulin lispro 1-5 Units Subcutaneous Q6H [facility] ALEX Tinoco    ipratropium 0 5 mg Nebulization Q6H [facility] Amee José, CRNP    levalbuterol 1 25 mg Nebulization Q6H Rylie Acron, CRNP    lisinopril 40 mg Oral Daily Bleckley Brilliant, CRNP    metoprolol succinate 100 mg Oral Daily Jaimie Brilliant, CRNP    pantoprazole 40 mg Oral Early Morning Bleckley Brilliant, CRNP    sertraline 100 mg Oral Daily Bleckley Brilliant, CRNP               Physical exam:  Vitals: Body mass index is 32 69 kg/m²  Blood pressure (!) 174/76, pulse 100, temperature (!) 97 2 °F (36 2 °C), resp  rate (!) 23, height 5' 3" (1 6 m), weight 83 7 kg (184 lb 8 4 oz), SpO2 97 %  ,  Temp  Min: 97 2 °F (36 2 °C)  Max: 102 2 °F (39 °C)  IBW: 52 4 kg    SpO2: 97 %  SpO2 Activity: At Rest  O2 Device: Nasal cannula      Intake/Output Summary (Last 24 hours) at 02/26/18 1148  Last data filed at 02/26/18 0745   Gross per 24 hour   Intake              300 ml   Output             1450 ml   Net            -1150 ml       Invasive/non-invasive ventilation settings:   Respiratory    Lab Data (Last 4 hours)    None         O2/Vent Data (Last 4 hours)    None              Invasive Devices     Peripheral Intravenous Line            Peripheral IV 02/23/18 Right Antecubital 2 days    Peripheral IV 02/25/18 Left Wrist 1 day    Peripheral IV 02/25/18 Right;Upper Antecubital 1 day          Drain            NG/OG/Enteral Tube Orogastric 16 Fr 1 day          Airway            ETT  Cuffed 8 mm 1 day                  Physical Exam:  Gen: no acute distress  HEENT: NC/At PERRLA EOMI  Neck: supple, no JVD, trachea midline, no adenopathy  Chest: coarse  Cor: Clear S1 and S2  Abd: soft NT/ND +BS  Ext: no edema  Neuro: A&Ox3, non-focal  Skin: W/D/I      Diagnostic Data:  Lab: I have personally reviewed pertinent lab results     CBC:     Results from last 7 days  Lab Units 02/25/18  0443 02/24/18  2252 02/24/18  0656   WBC Thousand/uL 14 10* 17 54* 8 01   HEMOGLOBIN g/dL 12 9 12 3 11 0*   HEMATOCRIT % 39 2 38 4 33 8*   PLATELETS Thousands/uL 199 341 245       CMP:     Results from last 7 days  Lab Units 02/25/18  0437 02/24/18  2252 02/24/18  0656 02/23/18  1823   SODIUM mmol/L 136 135* 139 138   POTASSIUM mmol/L 3 9 4 1 4 0 3 0*   CHLORIDE mmol/L 101 99* 102 99*   CO2 mmol/L 24 24 28 26   BUN mg/dL 12 12 10 9   CREATININE mg/dL 0 61 0 81 0 82 0 64   CALCIUM mg/dL 8 5 8 4 8 4 9 0   TOTAL PROTEIN g/dL  --   --   --  7 6   BILIRUBIN TOTAL mg/dL  --   --   --  0 54   ALK PHOS U/L  --   --   --  219*   ALT U/L  --   --   --  33   AST U/L  --   --   --  20   GLUCOSE RANDOM mg/dL 297* 488* 237* 290*     PT/INR:   No results found for: PT, INR,   Magnesium:   Results from last 7 days  Lab Units 02/24/18  2252   MAGNESIUM mg/dL 2 0     Phosphorous:   Results from last 7 days  Lab Units 02/24/18  2252   PHOSPHORUS mg/dL 4 4       Microbiology:    Results from last 7 days  Lab Units 02/23/18  2130 02/23/18  1931 02/23/18  1823 02/23/18  1719 02/23/18  0915   BLOOD CULTURE  No Growth at 48 hrs   --   --   --  No Growth at 48 hrs  INFLUENZA A PCR   --   --  None Detected  --   --    INFLUENZA B PCR   --   --  None Detected  --   --    RSV PCR   --   --  None Detected  --   --    STREP PNEUMONIAE ANTIGEN, URINE   --  Negative  --   --   --    LEGIONELLA URINARY ANTIGEN   --   --   --  Negative  --        Imaging:  none    Cardiac lab/EKG/telemetry/ECHO:   SR    VTE Prophylaxis: lovenox    Code Status: Level 1 - Full Code    ALEX Dave    Portions of the record may have been created with voice recognition software  Occasional wrong word or "sound a like" substitutions may have occurred due to the inherent limitations of voice recognition software  Read the chart carefully and recognize, using context, where substitutions have occurred

## 2018-02-26 NOTE — CONSULTS
Consultation - Neurology   Ramy Norton 65 y o  female MRN: [id number]  Unit/Bed#: ICU 02 Encounter: [id number]      Assessment/Plan   Assessment:  1  Episode of unresponsiveness in the setting of significant hypoxia, hyperglycemia, lactic acidosis  Onset was unwitnessed, no involuntary movements were seen by responders  Differential diagnosis:    - seizure, which would be considered triggered     - syncope, vagally mediated in the setting of cough   2  At present has a nonfocal neurologic examination  3  Acute community-acquired pneumonia, on Zithromax and ceftriaxone  4  History of left corona radiata (small vessel thrombotic) lacunar infarct with vascular risk factors diabetes hyperlipidemia and hypertension all uncontrolled prior to admission    Plan:  1  Routine EEG  If unremarkable for epileptiform activity, will require no further workup  If suggestive of seizures, will developed treatment plan with the patient  2   Deferred to Primary service for treatment of pneumonia  3  Continue aspirin 81 mg, Lipitor 40 mg, and risk modification (blood glucose and blood pressure management)    History of Present Illness     Physician Requesting Consult: Jay Jay Cannon DO  Reason for Consult / Principal Problem: Seizure   Hx and PE limited by:  None  Patient has no recall of event    HPI: Ramy Norton is a 65y o  year old female who presented to the emergency department at [facility] in [facility] on 02/23/2018 with a 2 week history of progressive flu-like symptoms, generalized weakness, diaphoresis, subjective fever  She had low-grade temperature on admission, was tachycardic, and hypertensive 232/111  Exam revealed course rales at the left base  CTA chest excluded pulmonary embolus, but demonstrated infiltrate in the left lower lobe and left upper lobe consistent with pneumonia  Point of care blood glucose 326    Labetalol was given for blood pressure, potassium was replaced, antibiotics were initiated  She was admitted with pneumonia, uncontrolled hypertension, hyperlipidemia, uncontrolled blood glucose; she was initially stable  On 02/24 at 2236 hours a rapid response was activated as the patient was found with profound hypoxia (50s) hypertension (200/96), and unresponsiveness  Although her eyes were open, she was nonverbal   She was incontinent of bladder and bowel  Blood gas:  7 261/52 8/3037 2  Blood glucose 488  Post event her temperature continue to arise until it peaked at 102 2 at 0738 hours  She was intubated for airway protection and transferred to ICU  CT head was unremarkable for acute ischemia, but did demonstrate chronic lacunar infarctions  She was extubated on 02/25, and placed on nasal cannula oxygen  She has been neurologically intact since extubation  Neurology is asked to determine if the cause of her unresponsiveness was seizure  Patient is an accurate historian as compared to the chart  Her last memory prior to the event was pressing her call bell and telling the nurses she was having difficulty breathing  Her next memory is awakening in the ICU, on the ventilator  She does admit to presyncope prior to admission, which was associated with cough and vomiting  There is no report of vomiting associated with the 2/24 event  She gave a history of having discontinued her prescription home medications in November 2017 due to lack of finances, although she continues with aspirin  She had restarted Zoloft several weeks prior to admission, but remained off antihypertensives and diabetes medications  She denies prior history of seizures  Inpatient consult to Neurology  Consult performed by: Wayne Marino ordered by: Jenny Alan          Review of Systems   Constitutional: Positive for fatigue  Respiratory: Positive for cough and shortness of breath  Gastrointestinal: Positive for diarrhea and vomiting     Neurological: Positive for weakness (Generalized)  Negative for speech difficulty and headaches  No current cognitive deficits   Psychiatric/Behavioral: Negative for confusion  All other systems reviewed and are negative  Historical Information   Past Medical History:   Diagnosis Date    Anxiety     Depression     Diabetes ([facility] [address] )     Esophageal reflux     28 APR 2015 RESOLVED    Hyperlipidemia     Hypertension     Low back pain     sciatica    Stroke ([facility] ) 12/2015    small vessel, L frontal corona radiata  Rx asa 81, Lipitor 40, risk modification    Vitamin D deficiency      History reviewed  No pertinent surgical history  Social History   History   Alcohol Use No     Comment: OCCASIONAL ALCOHOL USE IN ALL SCRIPTS     History   Drug Use No     History   Smoking Status    Former Smoker   Smokeless Tobacco    Never Used     Family History:   Family History   Problem Relation Age of Onset    Diabetes Mother     Lung cancer Mother     Cancer Father     Lung cancer Father     Hypertension Brother     Hypertension Family        Review of previous medical records was completed       Meds/Allergies   current meds:   Current Facility-Administered Medications   Medication Dose Route Frequency    acetaminophen (TYLENOL) tablet 650 mg  650 mg Oral Q6H PRN    ALPRAZolam (XANAX) tablet 0 5 mg  0 5 mg Oral HS PRN    amLODIPine (NORVASC) tablet 10 mg  10 mg Oral Daily    aspirin chewable tablet 81 mg  81 mg Oral Daily    atorvastatin (LIPITOR) tablet 40 mg  40 mg Oral Daily With Dinner    azithromycin (ZITHROMAX) 500 mg in sodium chloride 0 9% 250mL IVPB 500 mg  500 mg Intravenous Q24H    cefTRIAXone (ROCEPHIN) IVPB (premix) 1,000 mg  1,000 mg Intravenous Q24H    chlorhexidine (PERIDEX) 0 12 % oral rinse 15 mL  15 mL Swish & Spit Q12H [address]    enoxaparin (LOVENOX) subcutaneous injection 40 mg  40 mg Subcutaneous Daily    fentanyl citrate (PF) 100 MCG/2ML 25 mcg  25 mcg Intravenous Q1H PRN    hydrALAZINE (APRESOLINE) injection 5 mg  5 mg Intravenous Q6H PRN    insulin lispro (HumaLOG) 100 units/mL subcutaneous injection 1-5 Units  1-5 Units Subcutaneous Q6H [address]    ipratropium (ATROVENT) 0 02 % inhalation solution 0 5 mg  0 5 mg Nebulization Q6H    levalbuterol (XOPENEX) inhalation solution 1 25 mg  1 25 mg Nebulization Q6H    lisinopril (ZESTRIL) tablet 40 mg  40 mg Oral Daily    metoprolol succinate (TOPROL-XL) 24 hr tablet 100 mg  100 mg Oral Daily    pantoprazole (PROTONIX) EC tablet 40 mg  40 mg Oral Early Morning    sertraline (ZOLOFT) tablet 100 mg  100 mg Oral Daily    and PTA meds:   Prior to Admission Medications   Prescriptions Last Dose Informant Patient Reported? Taking?    ALPRAZolam (XANAX) 0 5 mg tablet More than a month at Unknown time  Yes No   Sig: Take 1 tablet by mouth 3 (three) times a day as needed   Insulin Pen Needle (BD PEN NEEDLE JOHN U/F) 32G X 4 MM MISC More than a month at Unknown time  Yes No   Sig: by Does not apply route   amLODIPine (NORVASC) 10 mg tablet More than a month at Unknown time  Yes No   Sig: Take 1 tablet by mouth daily   aspirin 81 MG tablet More than a month at Unknown time  Yes No   Sig: Take 1 tablet by mouth daily   atorvastatin (LIPITOR) 40 mg tablet More than a month at Unknown time  Yes No   Sig: Take 1 tablet by mouth daily   insulin aspart (NOVOLOG FLEXPEN) 100 Units/mL SOPN More than a month at Unknown time  Yes No   Sig: Inject under the skin   insulin glargine (LANTUS SOLOSTAR) injection pen 100 units/mL More than a month at Unknown time  Yes No   Sig: Inject under the skin   lisinopril (ZESTRIL) 40 mg tablet More than a month at Unknown time  Yes No   Sig: Take 1 tablet by mouth daily   meloxicam (MOBIC) 15 mg tablet More than a month at Unknown time  Yes No   Sig: Take 1 tablet by mouth   metoprolol succinate (TOPROL-XL) 100 mg 24 hr tablet More than a month at Unknown time  Yes No   Sig: Take 1 tablet by mouth daily   omeprazole (PriLOSEC) 20 mg delayed release capsule More than a month at Unknown time  Yes No   Sig: Take 1 capsule by mouth daily   sertraline (ZOLOFT) 100 mg tablet More than a month at Unknown time  Yes No   Sig: Take by mouth   sertraline (ZOLOFT) 50 mg tablet More than a month at Unknown time  Yes No   Sig: Take 1 tablet by mouth 2 (two) times a day   sitaGLIPtin-metFORMIN (JANUMET)  MG per tablet More than a month at Unknown time  Yes No   Sig: Take 1 tablet by mouth 2 (two) times a day   traMADol (ULTRAM) 50 mg tablet More than a month at Unknown time  Yes No   Sig: Take 1 tablet by mouth      Facility-Administered Medications: None   Off all prescription meds since 11/2017; resumed Zoloft 2 weeks PTA    No Known Allergies    Objective   Vitals:Blood pressure (!) 174/76, pulse 100, temperature 98 9 °F (37 2 °C), resp  rate (!) 23, height 5' 3" (1 6 m), weight 83 7 kg (184 lb 8 4 oz), SpO2 97 %  ,Body mass index is 32 69 kg/m²  Intake/Output Summary (Last 24 hours) at 02/26/18 1218  Last data filed at 02/26/18 0745   Gross per 24 hour   Intake              300 ml   Output             1250 ml   Net             -950 ml       Physical Exam   Constitutional: She is oriented to person, place, and time  She appears well-developed and well-nourished  No distress  HENT:   Head: Normocephalic and atraumatic  Mouth/Throat: Oropharynx is clear and moist    No intraoral trauma   Eyes: Conjunctivae and EOM are normal  No scleral icterus  Neck: Normal range of motion  Neck supple  Carotid bruit is not present  Cardiovascular: Normal rate, regular rhythm and intact distal pulses  Neurological: She is oriented to person, place, and time  She has normal strength  She has a normal Finger-Nose-Finger Test (Slight tremor bilaterally)  Reflex Scores:       Tricep reflexes are 2+ on the right side and 2+ on the left side  Bicep reflexes are 2+ on the right side and 2+ on the left side         Brachioradialis reflexes are 2+ on the right side and 2+ on the left side        Patellar reflexes are 2+ on the right side and 2+ on the left side  Achilles reflexes are 2+ on the right side and 2+ on the left side  Skin: Skin is warm and dry  She is not diaphoretic  Psychiatric: She has a normal mood and affect  Her speech is normal and behavior is normal  Judgment and thought content normal    Vitals reviewed  Neurologic Exam     Mental Status   Oriented to person, place, and time  Follows 2 step commands  Attention: normal  Concentration: normal    Speech: speech is normal   Level of consciousness: alert  Knowledge: good  Normal comprehension  Fluent     Cranial Nerves     CN II   Visual fields full to confrontation  CN III, IV, VI   Extraocular motions are normal    Right pupil: Reactivity: brisk  Consensual response: intact  Left pupil: Reactivity: brisk  Consensual response: APD  Conjugate gaze: present    CN V   Facial sensation intact  CN VII   Facial expression full, symmetric  CN VIII   CN VIII normal      CN IX, X   CN IX normal    CN X normal    Palate: symmetric    CN XI   CN XI normal      CN XII   CN XII normal    Tongue deviation: none  Unable to visualize fundus/optic discs  No forced lid closure weakness     Motor Exam   Muscle bulk: normal  Overall muscle tone: normal  Right arm pronator drift: absent  Left arm pronator drift: absent    Strength   Strength 5/5 throughout  Sensory Exam   Right arm vibration: normal  Left arm vibration: normal  Right leg vibration: normal  Left leg vibration: normal  Vibration perceived greater in bilateral upper extremities as compared to bilateral lower extremities    Temperature intact     Gait, Coordination, and Reflexes     Coordination   Finger to nose coordination: normal (Slight tremor bilaterally)    Tremor   Resting tremor: absent  Intention tremor: absent    Reflexes   Right brachioradialis: 2+  Left brachioradialis: 2+  Right biceps: 2+  Left biceps: 2+  Right triceps: 2+  Left triceps: 2+  Right patellar: 2+  Left patellar: 2+  Right achilles: 2+  Left achilles: 2+  Right : 2+  Left : 2+  Right plantar: normal  Left plantar: normal  Right Leblanc: absent  Left Leblanc: absent  Right ankle clonus: absent  Left ankle clonus: absent          Lab Results:   CBC:   Results from last 7 days  Lab Units 02/25/18  0443 02/24/18 2252 02/24/18  0656   WBC Thousand/uL 14 10* 17 54* 8 01   RBC Million/uL 4 51 4 29 3 92   HEMOGLOBIN g/dL 12 9 12 3 11 0*   HEMATOCRIT % 39 2 38 4 33 8*   MCV fL 87 90 86   PLATELETS Thousands/uL 199 341 245   , BMP/CMP:   Results from last 7 days  Lab Units 02/25/18  0437 02/24/18 2252 02/24/18  0656 02/23/18  1823   SODIUM mmol/L 136 135* 139 138   POTASSIUM mmol/L 3 9 4 1 4 0 3 0*   CHLORIDE mmol/L 101 99* 102 99*   CO2 mmol/L 24 24 28 26   ANION GAP mmol/L 11 12 9 13   BUN mg/dL 12 12 10 9   CREATININE mg/dL 0 61 0 81 0 82 0 64   GLUCOSE RANDOM mg/dL 297* 488* 237* 290*   CALCIUM mg/dL 8 5 8 4 8 4 9 0   AST U/L  --   --   --  20   ALT U/L  --   --   --  33   ALK PHOS U/L  --   --   --  219*   TOTAL PROTEIN g/dL  --   --   --  7 6   BILIRUBIN TOTAL mg/dL  --   --   --  0 54   EGFR ml/min/1 73sq m 102 81 80 100   , Coagulation:   Results from last 7 days  Lab Units 02/24/18 2252   INR  1 11   , Urinalysis:   Results from last 7 days  Lab Units 02/23/18  1931   COLOR UA  Yellow   CLARITY UA  Slightly Cloudy   SPEC GRAV UA  1 025   PH UA  6 5   LEUKOCYTES UA  Negative   NITRITE UA  Negative   PROTEIN UA mg/dl >=300*   GLUCOSE UA mg/dl 500 (1/2%)*   KETONES UA mg/dl Negative   BILIRUBIN UA  Negative   BLOOD UA  Moderate*    Lab Results   Component Value Date    HGBA1C 9 5 (H) 02/24/2018     Mag 2 0, PO4 4 4    Alb 3 2  Lactic acid 4 6  Influenza panel neg    05/2017:   FLP: , , HD 39, LD NA   TSH 3 610    Imaging Studies: I have personally reviewed pertinent reports     and I have personally reviewed pertinent films in PACS   CT head: Decreased attenuation is noted in periventricular and subcortical white matter demonstrating an appearance that is statistically most likely to represent mild microangiopathic change  Areas of decreased attenuation in the subcortical white matter of the posterior right frontal lobe and periventricular white matter of the anterior left parietal lobe, most compatible with chronic lacunar infarctions  No CT signs of acute infarction  No intracranial mass, mass effect or midline shift  No acute parenchymal hemorrhage  Decreased attenuation is noted in periventricular and subcortical white matter demonstrating an appearance that is statistically most likely to represent mild microangiopathic change  Areas of decreased attenuation in the subcortical white matter of the posterior right frontal lobe and periventricular white matter of the anterior left parietal lobe, most compatible with chronic lacunar infarctions  No CT signs of acute infarction  No intracranial mass, mass effect or midline shift  No acute parenchymal hemorrhage  + cerebral atrophy    12/2015 MRI brain with and without ([facility]) report review only: There is restricted diffusion in the left frontal corona radiata with associated T2/FLAIR hyperintensity, compatible with acute/subacute infarction  Ventricles and sulci are normal in size and configuration for patient's age  Foci of T2/FLAIR hyperintensity in the periventricular and subcortical white matter are nonspecific and likely related to microangiopathy  There is no acute parenchymal hemorrhage  No extra-axial collection or midline shift  No mass or mass effect  The major vascular flow voids are maintained at the skull base  Following contrast administration, there is no abnormal intracranial enhancement  MRA head and neck: 1  No hemodynamically significant stenosis of the proximal internal carotid  arteries by NASCET criteria   2  No flow gap to suggest high-grade stenosis in the major intracranial circulation  3  Mild stenosis involving the middle cerebral artery branches bilaterally  4  Infundibula versus small aneurysms arising from the proximal A2 segment of the left anterior cerebral artery and distal M1 segment of the right middle cerebral artery  EKG, Pathology, and Other Studies: I have personally reviewed pertinent reports  CXR: Cardiomediastinal silhouette appears unremarkable  There are increasing regions of alveolar opacity within the upper lung zones bilaterally suggesting worsening infiltrates  The bibasilar regions of airspace opacity may have progressed on the left  Osseous structures appear within normal limits for patient age  CTA chest (PE study): No evidence of acute pulmonary embolism  Left lower lobe pneumonia  Smaller patchy focus of pneumonia anteriorly left upper lobe  Small bilateral pleural effusions  12/2015 echocardiogram ([facility]): Normal left ventricular systolic function  Estimated left ventricular ejection fraction is 60%  Normal right ventricular size and function  Aortic sclerosis without stenosis   No aortic regurgitation  The pulmonary artery pressure could not be calculated due to an incomplete tricuspid regurgitant gradient measurement  Moderate diastolic dysfunction with elevated filling pressures  LEFT ATRIUM  Normal left atrial size  RIGHT ATRIUM Normal right atrial size       VTE Prophylaxis: Sequential compression device (Venodyne)  and Enoxaparin (Lovenox)

## 2018-02-27 ENCOUNTER — APPOINTMENT (INPATIENT)
Dept: NEUROLOGY | Facility: HOSPITAL | Age: 58
DRG: 208 | End: 2018-02-27
Payer: COMMERCIAL

## 2018-02-27 LAB
GLUCOSE SERPL-MCNC: 207 MG/DL (ref 65–140)
GLUCOSE SERPL-MCNC: 208 MG/DL (ref 65–140)
GLUCOSE SERPL-MCNC: 212 MG/DL (ref 65–140)
GLUCOSE SERPL-MCNC: 227 MG/DL (ref 65–140)
GLUCOSE SERPL-MCNC: 231 MG/DL (ref 65–140)
GLUCOSE SERPL-MCNC: 245 MG/DL (ref 65–140)

## 2018-02-27 PROCEDURE — 95816 EEG AWAKE AND DROWSY: CPT

## 2018-02-27 PROCEDURE — 82948 REAGENT STRIP/BLOOD GLUCOSE: CPT

## 2018-02-27 PROCEDURE — 94640 AIRWAY INHALATION TREATMENT: CPT

## 2018-02-27 PROCEDURE — 99233 SBSQ HOSP IP/OBS HIGH 50: CPT | Performed by: INTERNAL MEDICINE

## 2018-02-27 PROCEDURE — 95816 EEG AWAKE AND DROWSY: CPT | Performed by: PSYCHIATRY & NEUROLOGY

## 2018-02-27 PROCEDURE — 94760 N-INVAS EAR/PLS OXIMETRY 1: CPT

## 2018-02-27 RX ORDER — ONDANSETRON 2 MG/ML
INJECTION INTRAMUSCULAR; INTRAVENOUS
Status: COMPLETED
Start: 2018-02-27 | End: 2018-02-27

## 2018-02-27 RX ORDER — ONDANSETRON 2 MG/ML
INJECTION INTRAMUSCULAR; INTRAVENOUS
Status: DISPENSED
Start: 2018-02-27 | End: 2018-02-27

## 2018-02-27 RX ORDER — ONDANSETRON 2 MG/ML
8 INJECTION INTRAMUSCULAR; INTRAVENOUS EVERY 8 HOURS PRN
Status: DISCONTINUED | OUTPATIENT
Start: 2018-02-27 | End: 2018-03-02 | Stop reason: HOSPADM

## 2018-02-27 RX ADMIN — ONDANSETRON 8 MG: 2 INJECTION INTRAMUSCULAR; INTRAVENOUS at 00:47

## 2018-02-27 RX ADMIN — ACETAMINOPHEN 650 MG: 325 TABLET, FILM COATED ORAL at 06:46

## 2018-02-27 RX ADMIN — IPRATROPIUM BROMIDE 0.5 MG: 0.5 SOLUTION RESPIRATORY (INHALATION) at 07:42

## 2018-02-27 RX ADMIN — ENOXAPARIN SODIUM 40 MG: 40 INJECTION SUBCUTANEOUS at 08:56

## 2018-02-27 RX ADMIN — AMLODIPINE BESYLATE 10 MG: 10 TABLET ORAL at 08:55

## 2018-02-27 RX ADMIN — LEVALBUTEROL HYDROCHLORIDE 1.25 MG: 1.25 SOLUTION, CONCENTRATE RESPIRATORY (INHALATION) at 20:18

## 2018-02-27 RX ADMIN — HYDRALAZINE HYDROCHLORIDE 5 MG: 20 INJECTION INTRAMUSCULAR; INTRAVENOUS at 17:18

## 2018-02-27 RX ADMIN — HYDRALAZINE HYDROCHLORIDE 5 MG: 20 INJECTION INTRAMUSCULAR; INTRAVENOUS at 03:17

## 2018-02-27 RX ADMIN — SERTRALINE HYDROCHLORIDE 100 MG: 100 TABLET ORAL at 08:56

## 2018-02-27 RX ADMIN — AZITHROMYCIN MONOHYDRATE 500 MG: 500 INJECTION, POWDER, LYOPHILIZED, FOR SOLUTION INTRAVENOUS at 23:00

## 2018-02-27 RX ADMIN — ATORVASTATIN CALCIUM 40 MG: 40 TABLET, FILM COATED ORAL at 17:14

## 2018-02-27 RX ADMIN — LEVALBUTEROL HYDROCHLORIDE 1.25 MG: 1.25 SOLUTION, CONCENTRATE RESPIRATORY (INHALATION) at 13:01

## 2018-02-27 RX ADMIN — PANTOPRAZOLE SODIUM 40 MG: 40 TABLET, DELAYED RELEASE ORAL at 06:38

## 2018-02-27 RX ADMIN — ALPRAZOLAM 0.5 MG: 0.5 TABLET ORAL at 19:39

## 2018-02-27 RX ADMIN — ACETAMINOPHEN 650 MG: 325 TABLET, FILM COATED ORAL at 23:02

## 2018-02-27 RX ADMIN — METOPROLOL SUCCINATE 100 MG: 50 TABLET, EXTENDED RELEASE ORAL at 08:55

## 2018-02-27 RX ADMIN — ASPIRIN 81 MG 81 MG: 81 TABLET ORAL at 08:56

## 2018-02-27 RX ADMIN — IPRATROPIUM BROMIDE 0.5 MG: 0.5 SOLUTION RESPIRATORY (INHALATION) at 13:01

## 2018-02-27 RX ADMIN — IPRATROPIUM BROMIDE 0.5 MG: 0.5 SOLUTION RESPIRATORY (INHALATION) at 20:18

## 2018-02-27 RX ADMIN — CEFTRIAXONE 1000 MG: 1 INJECTION, SOLUTION INTRAVENOUS at 21:17

## 2018-02-27 RX ADMIN — LISINOPRIL 40 MG: 20 TABLET ORAL at 08:55

## 2018-02-27 RX ADMIN — LEVALBUTEROL HYDROCHLORIDE 1.25 MG: 1.25 SOLUTION, CONCENTRATE RESPIRATORY (INHALATION) at 07:42

## 2018-02-27 NOTE — OCCUPATIONAL THERAPY NOTE
OT Cancellation Note:    Jessica Dubon  2/27/2018     OT consult received  Chart reviewed  Attempted to see pt this AM for OT evaluation, however pt having EEG at bedside  Therefore, pt cx'd this am  OT to attempt evaluation at later time and follow pt on caseload as able to A w/ safe d/c planning/recommendations       Samantha Oliveira OTR/L

## 2018-02-27 NOTE — PROGRESS NOTES
[facility] Internal Medicine Progress Note  Patient: Tani Tom 65 y o  female   MRN: [id number]  PCP: Waldo Reyes DO  Unit/Bed#: ICU 02 Encounter: [id number]  Date Of Visit: 18    Assessment:    Principal Problem:    Pneumonia of both lower lobes due to infectious organism  Active Problems:    Hypertensive urgency    Hyperlipidemia    Esophageal reflux    Type 2 diabetes mellitus with hyperglycemia, without long-term current use of insulin (HCC)    Class 1 obesity due to excess calories with serious comorbidity and body mass index (BMI) of 33 0 to 33 9 in adult    Acute encephalopathy    Acute respiratory failure (HCC)      Plan:  Pneumonia  Clinically improving  Continue Rocephin and azithromycin  Blood cultures no growth to date    Acute hypoxemic respiratory failure  Likely multifactorial in origin  Continue supplemental oxygenation  Will attempt to wean off     diabetes  SSI    VTE Pharmacologic Prophylaxis:   Pharmacologic: Heparin  Mechanical VTE Prophylaxis in Place: No    Patient Centered Rounds: I have performed bedside rounds with nursing staff today  Discussions with Specialists or Other Care Team Provider:  Yes    Education and Discussions with Family / Patient:  Yes    Time Spent for Care: 45 minutes  More than 50% of total time spent on counseling and coordination of care as described above  Current Length of Stay: 4 day(s)    Current Patient Status: Inpatient   Certification Statement: The patient will continue to require additional inpatient hospital stay due to Pneumonia    Discharge Plan / Estimated Discharge Date:  24-48 hours if clinical course allows    Code Status: Level 1 - Full Code      Subjective:   No complaints    Objective:     Vitals:   Temp (24hrs), Av 1 °F (37 3 °C), Min:97 8 °F (36 6 °C), Max:100 °F (37 8 °C)    HR:  [] 94  Resp:  [22-25] 25  BP: (141-185)/(65-96) 166/68  SpO2:  [91 %-99 %] 93 %  Body mass index is 32 69 kg/m²       Input and Output Summary (last 24 hours): Intake/Output Summary (Last 24 hours) at 02/27/18 1615  Last data filed at 02/27/18 1307   Gross per 24 hour   Intake             1060 ml   Output             1350 ml   Net             -290 ml       Physical Exam:     Physical Exam  Constitutional:  Well developed, well nourished, no acute distress, non-toxic appearance   Eyes:  PERRL, conjunctiva normal   HENT:  Atraumatic, external ears normal, nose normal, oropharynx moist, no pharyngeal exudates  Neck- normal range of motion, no tenderness, supple   Respiratory:  No respiratory distress, normal breath sounds, no rales, no wheezing   Cardiovascular:  Normal rate, normal rhythm, no murmurs, no gallops, no rubs   GI:  Soft, nondistended, normal bowel sounds, nontender, no organomegaly, no mass, no rebound, no guarding   :  No costovertebral angle tenderness   Musculoskeletal:  No edema, no tenderness, no deformities   Back- no tenderness  Integument:  Well hydrated, no rash   Lymphatic:  No lymphadenopathy noted   Neurologic:  Alert & oriented x 3, CN 2-12 normal, normal motor function, normal sensory function, no focal deficits noted   Psychiatric:  Speech and behavior appropriate  Additional Data:     Labs:      Results from last 7 days  Lab Units 02/25/18  0443   WBC Thousand/uL 14 10*   HEMOGLOBIN g/dL 12 9   HEMATOCRIT % 39 2   PLATELETS Thousands/uL 199   NEUTROS PCT % 86*   LYMPHS PCT % 6*   MONOS PCT % 8   EOS PCT % 0       Results from last 7 days  Lab Units 02/25/18  0437  02/23/18  1823   SODIUM mmol/L 136  < > 138   POTASSIUM mmol/L 3 9  < > 3 0*   CHLORIDE mmol/L 101  < > 99*   CO2 mmol/L 24  < > 26   BUN mg/dL 12  < > 9   CREATININE mg/dL 0 61  < > 0 64   CALCIUM mg/dL 8 5  < > 9 0   TOTAL PROTEIN g/dL  --   --  7 6   BILIRUBIN TOTAL mg/dL  --   --  0 54   ALK PHOS U/L  --   --  219*   ALT U/L  --   --  33   AST U/L  --   --  20   GLUCOSE RANDOM mg/dL 297*  < > 290*   < > = values in this interval not displayed  Results from last 7 days  Lab Units 02/24/18  2252   INR  1 11       * I Have Reviewed All Lab Data Listed Above  * Additional Pertinent Lab Tests Reviewed: Nilda 67 Admission Reviewed    Imaging:    Imaging Reports Reviewed Today Include:  Yes  Imaging Personally Reviewed by Myself Includes:  Yes    Recent Cultures (last 7 days):       Results from last 7 days  Lab Units 02/23/18  2130 02/23/18  1823 02/23/18  1719 02/23/18  0915   BLOOD CULTURE  No Growth at 72 hrs   --   --  No Growth at 72 hrs     INFLUENZA A PCR   --  None Detected  --   --    INFLUENZA B PCR   --  None Detected  --   --    RSV PCR   --  None Detected  --   --    LEGIONELLA URINARY ANTIGEN   --   --  Negative  --        Last 24 Hours Medication List:     Current Facility-Administered Medications:  acetaminophen 650 mg Oral Q6H PRN Jana Alvarez, BENNP    ALPRAZolam 0 5 mg Oral HS PRN Candlakeshia Line, CRNP    amLODIPine 10 mg Oral Daily Levell Shireen, CRNP    aspirin 81 mg Oral Daily Levell Shireen, CRNP    atorvastatin 40 mg Oral Daily [employer], CRNP    azithromycin 500 mg Intravenous Q24H ALEX Ceja Last Rate: Stopped (02/26/18 2136)   cefTRIAXone 1,000 mg Intravenous Q24H Miracle Morelos PA-C Last Rate: Stopped (02/26/18 2215)   chlorhexidine 15 mL Swish & Spit Q12H [facility] ALEX Cárdenas    enoxaparin 40 mg Subcutaneous Daily Miracle Morelos PA-C    fentanyl citrate (PF) 25 mcg Intravenous Q1H PRN ALEX Cárdenas    hydrALAZINE 5 mg Intravenous Q6H PRN Glen Dubin, MD    insulin lispro 1-5 Units Subcutaneous Q6H [facility] Jnaa Alvarez, ALEX    ipratropium 0 5 mg Nebulization TID Mindi Ambron, DO    levalbuterol 1 25 mg Nebulization TID Mindi Ambron, DO    lisinopril 40 mg Oral Daily Levell Shireen, CRNP    metoprolol succinate 100 mg Oral Daily Yanet Crews, ALEX    ondansetron 8 mg Intravenous Q8H PRN Canda Line, CRNP    pantoprazole 40 mg Oral Early Morning Levell Shireen, ALEX    sertraline 100 mg Oral Daily ALEX Erwin         Today, Patient Was Seen By: Lizzeth Salamanca MD    ** Please Note: This note has been constructed using a voice recognition system   **

## 2018-02-27 NOTE — PHYSICAL THERAPY NOTE
PHYSICAL THERAPY NOTE          Patient Name: Ashwin DIGGS Date: 2/27/2018     Pt having EEG at bedside  PT consult received    PT will follow and attempt eval at later time    Beverley Phoenix, PT

## 2018-02-27 NOTE — CASE MANAGEMENT
Continued Stay Review    Date:        2/27/2018    Vital Signs: BP (!) 174/74   Pulse 94   Temp 99 2 °F (37 3 °C) (Temporal)   Resp 22   Ht 5' 3" (1 6 m)   Wt 83 7 kg (184 lb 8 4 oz)   SpO2 97%   BMI 32 69 kg/m²     Medications:   Scheduled Meds:   Current Facility-Administered Medications:  acetaminophen 650 mg Oral Q6H PRN ALEX Winn    ALPRAZolam 0 5 mg Oral HS PRN ALEX Bishop    amLODIPine 10 mg Oral Daily Zara Bynum, ALEX    aspirin 81 mg Oral Daily Zara Bynum, ALEX    atorvastatin 40 mg Oral Daily [employer], CRNP    azithromycin 500 mg Intravenous Q24H ALEX Winn Last Rate: Stopped (02/26/18 2136)   cefTRIAXone 1,000 mg Intravenous Q24H Lilliana Harry PA-C Last Rate: Stopped (02/26/18 2215)   chlorhexidine 15 mL Swish & Spit Q12H [address] ALEX Shetty    enoxaparin 40 mg Subcutaneous Daily Lilliana Harry PA-C    fentanyl citrate (PF) 25 mcg Intravenous Q1H PRN ALEX Shetty    hydrALAZINE 5 mg Intravenous Q6H PRN Maria Teresa Montgomery MD    insulin lispro 1-5 Units Subcutaneous Q6H [address] ALEX Winn    ipratropium 0 5 mg Nebulization TID Mindi Dietz,     levalbuterol 1 25 mg Nebulization TID Mindi Dietz DO    lisinopril 40 mg Oral Daily ALEX Stockton    metoprolol succinate 100 mg Oral Daily ALEX Castillo    ondansetron        ondansetron 8 mg Intravenous Q8H PRN ALEX Bishop    pantoprazole 40 mg Oral Early Morning ALEX Stockton    sertraline 100 mg Oral Daily ALEX Stockton      Continuous Infusions:    PRN Meds:   acetaminophen    ALPRAZolam    fentanyl citrate (PF)    hydrALAZINE    ondansetron    Abnormal Labs/Diagnostic Results:    BS  227    Age/Sex: 65 y o  female     RAPID  RESPONSE   2/25  @    0147  Patient transferred to ICU from  following a rapid response d/t hypoxia  Patient found by bedside RN to be hypoxic with oxygen saturation in the 50s   Prior to this episode, patient was noted to be anxious, but alert and oriented  Upon initial assessment during rapid response, patient was noted to be obtunded with eyes open, without spontaneous movement, no verbalization, audible coarse respiratory sounds, incontinent of urine and feces  Her oral airway was suctioned and she was immediately placed on 100% oxygen with improvement of oxygen saturation of 100%, with protection of her airway and adequate respirations  Patient remained unresponsive, she was placed on the cardiac monitor and found to be in a sinus tachycardia in the 110s  An EKG showed no ST elevations  Peripheral IVS were placed, a fluid bolus was given and labs were drawn  Shortly after transfer to ICU patient was no longer protecting her airway and intubated by anesthesia without difficulty  Dr Daniela Callejas from neurology was consulted regarding her change in neurological status  A Head CT scan was obtained and revealed hypodensity of the right MCA, which was also noted on a 2015 MRA  Following patient transport from CT scan, patient began to follow simple commands and open eyes spontaneously  Patient's daughter updated on events above and came to bedside  Intubated    2/25  @   0215   And  Extubated    2/25    Assessment/Plan:   PROGRESS  NOTE  IS    2/26      ( No   Rounds  Thus  Far  2/27)  1  Acute hypoxic respiratory failure multifactorial due to community acquired pneumonia and possible seizure  1  Patient was extubated yesterday and is currently on nasal cannula oxygenating well  2  Continue Ceftriaxone and azithromycin D#4  3  Out of bed as tolerated  PT/OT  2  Acute metabolic encephalopathy possible due to seizure: now improved  1  Consult neurology due to possible seizure on 2/25/2018  3  Hypertension  1  Restart home medications: lisinopril, norvasc and lopressor  2  Hydralazine prn  4  Diabetes Mellitus type II  1  accu checks and SSI  5  Hyperlipidemia  1  Continue lipitor  6  GERD  1  Continue Protonix  7   History of stroke    Per Neurology  Consult:  Episode of unresponsiveness in the setting of significant hypoxia, hyperglycemia, lactic acidosis  Onset was unwitnessed, no involuntary movements were seen by responders  Differential diagnosis:    - seizure, which would be considered triggered     - syncope, vagally mediated in the setting of cough   2  At present has a nonfocal neurologic examination  3  Acute community-acquired pneumonia, on Zithromax and ceftriaxone  4  History of left corona radiata (small vessel thrombotic) lacunar infarct with vascular risk factors diabetes hyperlipidemia and hypertension all uncontrolled prior to admission     Plan:  1  Routine EEG  If unremarkable for epileptiform activity, will require no further workup  If suggestive of seizures, will developed treatment plan with the patient  2   Deferred to Primary service for treatment of pneumonia  3  Continue aspirin 81 mg, Lipitor 40 mg, and risk modification (blood glucose and blood pressure management)  Kenroy Varghese is a 65y o  year old female who presented to the emergency department at [facility] in [facility] on 02/23/2018 with a 2 week history of progressive flu-like symptoms, generalized weakness, diaphoresis, subjective fever  She had low-grade temperature on admission, was tachycardic, and hypertensive 232/111  Exam revealed course rales at the left base  CTA chest excluded pulmonary embolus, but demonstrated infiltrate in the left lower lobe and left upper lobe consistent with pneumonia  Point of care blood glucose 326  Labetalol was given for blood pressure, potassium was replaced, antibiotics were initiated  She was admitted with pneumonia, uncontrolled hypertension, hyperlipidemia, uncontrolled blood glucose; she was initially stable  On 02/24 at 2236 hours a rapid response was activated as the patient was found with profound hypoxia (50s) hypertension (200/96), and unresponsiveness    Although her eyes were open, she was nonverbal   She was incontinent of bladder and bowel  Blood gas:  7 261/52 8/3037 2  Blood glucose 488  Post event her temperature continue to arise until it peaked at 102 2 at 0738 hours  She was intubated for airway protection and transferred to ICU  CT head was unremarkable for acute ischemia, but did demonstrate chronic lacunar infarctions  She was extubated on 02/25, and placed on nasal cannula oxygen  She has been neurologically intact since extubation  Neurology is asked to determine if the cause of her unresponsiveness was seizure      Patient is an accurate historian as compared to the chart  Her last memory prior to the event was pressing her call bell and telling the nurses she was having difficulty breathing  Her next memory is awakening in the ICU, on the ventilator  She does admit to presyncope prior to admission, which was associated with cough and vomiting  There is no report of vomiting associated with the 2/24 event      She gave a history of having discontinued her prescription home medications in November 2017 due to lack of finances, although she continues with aspirin  She had restarted Zoloft several weeks prior to admission, but remained off antihypertensives and diabetes medications  Discharge Plan:   Home    Thank you,  [address] [place] [address] in the [facility] for 2017  Network Utilization Review Department  Phone: [phone]; Fax [phone]  ATTENTION: The Network Utilization Review Department is now centralized for our 7 Facilities  Make a note that we have a new phone and fax numbers for our Department  Please call with any questions or concerns to [phone] and carefully follow the prompts so that you are directed to the right person  All voicemails are confidential  Fax any determinations, approvals, denials, and requests for initial or continue stay review clinical to [phone]   Due to HIGH CALL volume, it would be easier if you could please send faxed requests to expedite your requests and in part, help us provide discharge notifications faster

## 2018-02-28 LAB
GLUCOSE SERPL-MCNC: 165 MG/DL (ref 65–140)
GLUCOSE SERPL-MCNC: 177 MG/DL (ref 65–140)
GLUCOSE SERPL-MCNC: 201 MG/DL (ref 65–140)
GLUCOSE SERPL-MCNC: 249 MG/DL (ref 65–140)
GLUCOSE SERPL-MCNC: 261 MG/DL (ref 65–140)
GLUCOSE SERPL-MCNC: 278 MG/DL (ref 65–140)

## 2018-02-28 PROCEDURE — G8978 MOBILITY CURRENT STATUS: HCPCS

## 2018-02-28 PROCEDURE — 94760 N-INVAS EAR/PLS OXIMETRY 1: CPT

## 2018-02-28 PROCEDURE — G8979 MOBILITY GOAL STATUS: HCPCS

## 2018-02-28 PROCEDURE — 97163 PT EVAL HIGH COMPLEX 45 MIN: CPT

## 2018-02-28 PROCEDURE — G8992 OTHER PT/OT  D/C STATUS: HCPCS

## 2018-02-28 PROCEDURE — 97166 OT EVAL MOD COMPLEX 45 MIN: CPT

## 2018-02-28 PROCEDURE — 82948 REAGENT STRIP/BLOOD GLUCOSE: CPT

## 2018-02-28 PROCEDURE — G8991 OTHER PT/OT GOAL STATUS: HCPCS

## 2018-02-28 PROCEDURE — 99233 SBSQ HOSP IP/OBS HIGH 50: CPT | Performed by: INTERNAL MEDICINE

## 2018-02-28 PROCEDURE — G8990 OTHER PT/OT CURRENT STATUS: HCPCS

## 2018-02-28 PROCEDURE — 94640 AIRWAY INHALATION TREATMENT: CPT

## 2018-02-28 RX ORDER — GUAIFENESIN/DEXTROMETHORPHAN 100-10MG/5
10 SYRUP ORAL EVERY 4 HOURS PRN
Status: DISCONTINUED | OUTPATIENT
Start: 2018-02-28 | End: 2018-03-02 | Stop reason: HOSPADM

## 2018-02-28 RX ADMIN — SERTRALINE HYDROCHLORIDE 100 MG: 100 TABLET ORAL at 08:18

## 2018-02-28 RX ADMIN — IPRATROPIUM BROMIDE 0.5 MG: 0.5 SOLUTION RESPIRATORY (INHALATION) at 08:50

## 2018-02-28 RX ADMIN — ASPIRIN 81 MG 81 MG: 81 TABLET ORAL at 08:18

## 2018-02-28 RX ADMIN — METOPROLOL SUCCINATE 100 MG: 50 TABLET, EXTENDED RELEASE ORAL at 08:17

## 2018-02-28 RX ADMIN — CHLORHEXIDINE GLUCONATE 15 ML: 1.2 RINSE ORAL at 08:18

## 2018-02-28 RX ADMIN — IPRATROPIUM BROMIDE 0.5 MG: 0.5 SOLUTION RESPIRATORY (INHALATION) at 13:30

## 2018-02-28 RX ADMIN — PANTOPRAZOLE SODIUM 40 MG: 40 TABLET, DELAYED RELEASE ORAL at 05:30

## 2018-02-28 RX ADMIN — ENOXAPARIN SODIUM 40 MG: 40 INJECTION SUBCUTANEOUS at 08:18

## 2018-02-28 RX ADMIN — AMLODIPINE BESYLATE 10 MG: 10 TABLET ORAL at 08:18

## 2018-02-28 RX ADMIN — IPRATROPIUM BROMIDE 0.5 MG: 0.5 SOLUTION RESPIRATORY (INHALATION) at 20:13

## 2018-02-28 RX ADMIN — LEVALBUTEROL HYDROCHLORIDE 1.25 MG: 1.25 SOLUTION, CONCENTRATE RESPIRATORY (INHALATION) at 13:30

## 2018-02-28 RX ADMIN — LEVALBUTEROL HYDROCHLORIDE 1.25 MG: 1.25 SOLUTION, CONCENTRATE RESPIRATORY (INHALATION) at 20:13

## 2018-02-28 RX ADMIN — ATORVASTATIN CALCIUM 40 MG: 40 TABLET, FILM COATED ORAL at 17:07

## 2018-02-28 RX ADMIN — LISINOPRIL 40 MG: 20 TABLET ORAL at 08:18

## 2018-02-28 RX ADMIN — AZITHROMYCIN MONOHYDRATE 500 MG: 500 INJECTION, POWDER, LYOPHILIZED, FOR SOLUTION INTRAVENOUS at 21:05

## 2018-02-28 RX ADMIN — LEVALBUTEROL HYDROCHLORIDE 1.25 MG: 1.25 SOLUTION, CONCENTRATE RESPIRATORY (INHALATION) at 08:50

## 2018-02-28 RX ADMIN — ALPRAZOLAM 0.5 MG: 0.5 TABLET ORAL at 21:07

## 2018-02-28 NOTE — OCCUPATIONAL THERAPY NOTE
OccupationalTherapy Evaluation     Patient Name: Monserrat Gibbs Date: 2/28/2018  Problem List  Patient Active Problem List   Diagnosis    Pneumonia of both lower lobes due to infectious organism    Hypertensive urgency    Hyperlipidemia    Esophageal reflux    Type 2 diabetes mellitus with hyperglycemia, without long-term current use of insulin ([facility])    Class 1 obesity due to excess calories with serious comorbidity and body mass index (BMI) of 33 0 to 33 9 in adult    Acute encephalopathy    Acute respiratory failure ([facility] )     Past Medical History  Past Medical History:   Diagnosis Date    Anxiety     Depression     Diabetes ([facility] )     Esophageal reflux     28 APR 2015 RESOLVED    Hyperlipidemia     Hypertension     Low back pain     sciatica    Stroke ([facility] ) 12/2015    small vessel, L frontal corona radiata  Rx asa 81, Lipitor 40, risk modification    Vitamin D deficiency      Past Surgical History  History reviewed  No pertinent surgical history  02/28/18 1150   Note Type   Note type Eval only   Restrictions/Precautions   Weight Bearing Precautions Per Order No   Other Precautions Fall Risk;Telemetry;O2   Pain Assessment   Pain Assessment No/denies pain   Pain Score No Pain   Home Living   Type of [address] [facility] One level;Stairs to enter with rails; Laundry in basement  (2 LELO)   Bathroom Shower/Tub Tub/shower unit   Bathroom Toilet Standard   Bathroom Equipment Grab bars in shower;Grab bars around toilet   [address] Other (Comment)  (none per pt report)   Additional Comments Pt reports living alone in a one level home w/ 2 LELO  Pt's brother lives next door and is able to assist as needed     Prior Function   Level of [place] Independent with ADLs and functional mobility   Lives With Alone   Receives Help From Family  (brother lives next door, local dtr)   ADL Assistance Independent   IADLs Independent   Falls in the last 6 months 0   Vocational Full time employment   Comments Pt reports I w/ ADLs, IADLs, and functional mobility/transfers w/o use of DME/AD, (+) , 0 falls, and PT employment   Lifestyle   Autonomy Pt reports I w/ ADLs, IADLs, and functional mobility/transfers w/o use of DME/AD, (+) , 0 falls, and PT employment   Reciprocal Relationships Lives alone; Supportive family   Service to Others FT employment at [employer] time w/ cat, watching TV   Psychosocial   Psychosocial (WDL) WDL   ADL   Eating Assistance 7  Independent   Grooming Assistance 6  Modified Independent   UB Bathing Assistance 6  Modified Independent   LB [address] 6  Modified Independent   [address] 6  Modified independent   [address]  6  Modified independent   Bed Mobility   Supine to Sit Unable to assess  (Pt seated OOB in chair at start/end of session)   Sit to Supine Unable to assess  (Pt seated OOB in chair at start/end of session)   Additional Comments Pt seated OOB in chair at end of session w/ call bell and phone within reach  All needs met and pt reported no further questions for OT at this time  Transfers   Sit to Stand 5  Supervision   Additional items Assist x 1; Armrests; Increased time required;Verbal cues   Stand to Sit 5  Supervision   Additional items Assist x 1; Armrests; Increased time required;Verbal cues   Toilet transfer 5  Supervision   Additional items Assist x 1; Increased time required;Verbal cues;Standard toilet  (use of grab bars)   Additional Comments VCs for safe transfer techniques (hand placement)   Functional Mobility   Functional Mobility 5  Supervision   Additional Comments Assist x1 w/o use of AD   Balance   Static Sitting Normal   Dynamic Sitting Good   Static Standing Fair +   Dynamic Standing Fair +   Ambulatory Fair   Activity Tolerance   Activity Tolerance Patient limited by fatigue;Patient tolerated treatment well   Nurse Made Aware Pt able to be seen per RN Janice   RUE Assessment   RUE Assessment WFL   LUE Assessment   LUE Assessment WFL   Hand Function   Gross Motor Coordination Functional   Fine Motor Coordination Functional   Sensation   Light Touch No apparent deficits   Sharp/Dull No apparent deficits   Proprioception   Proprioception No apparent deficits   Vision - Complex Assessment   Ocular Range of Motion WFL   Acuity Able to read clock/calendar on wall without difficulty   Cognition   Overall Cognitive Status [facility]   Arousal/Participation Alert; Cooperative   Attention Within functional limits   Orientation Level Oriented X4   Memory Within functional limits   Following Commands Follows multistep commands without difficulty   Assessment   Prognosis Fair   Assessment Pt is a 65 y o  female seen for OT evaluation s/p adm to [address] on 2/23/2018 w/ Pneumonia of both lower lobes due to infectious organism   Pt requeired emergent intubation 2* hypoxia, lactic acidosis, and r/o seizures 2* unresponsive episode  Pt now extubated and transferred out of ICU  Comorbidities affecting pts functional performance include a significant PMH of anxiety, depression, HTN, HLD, low back pain, hx of CVA, and Diabetes  Pt with active OT orders  Pt lives alone in a one level home, however pt reports brother lives next door and is able to assist as needed  At baseline, pt was I w/ ADLs, IADLs, and functional mobility/transfers w/o use of DME/AD, (+)drove, FT employed, & reports 0 falls  Upon evaluation, pt currently functioning at an overall Mod I-Supervision level for overall ADLs and Supervision for functional mobility/transfers 2* the following deficits impacting occupational performance: weakness, decreased tolerance and SOB   These impairments, however do not limit pts ability to safely engage in all baseline areas of occupation, including eating, grooming, bathing, dressing, toileting, functional mobility/transfers, community mobility, laundry , house maintenance, meal prep, cleaning, social participation  and leisure activities as pt is functioning at baseline level  Pt scored overall 65/100 on the Barthel Index  Based on the aforementioned OT evaluation, functional performance deficits, and assessments, pt has been identified as a moderate complexity evaluation  Educated pt on energy conservation techniques during ADLs and IADLs to decrease energy expenditure  Pt verbalized understanding of all education  No further acute OT needs identified at this time  Recommend continued mobilization with hospital staff while in the hospital to increase pts endurance and strength upon D/C  From OT standpoint, recommend D/C home with family support when medically cleared  D/C pt from OT caseload at this time      Goals   Patient Goals to go home   Plan   OT Frequency Eval only   Recommendation   OT Discharge Recommendation Home with family support   OT - OK to Discharge Yes  (when medically cleared)   Barthel Index   Feeding 10   Bathing 5   Grooming Score 5   Dressing Score 10   Bladder Score 5   Bowels Score 5   Toilet Use Score 10   Transfers (Bed/Chair) Score 15   Mobility (Level Surface) Score 0   Stairs Score 0   Barthel Index Score 65   Modified Sherry Scale   Modified [place] Scale 3        Mariana Sandhu, OTR/L

## 2018-02-28 NOTE — SOCIAL WORK
CM met with pt at bedside to discuss discharge needs  Pt lives in a house by herself  She does have family that lives next door  ADL's are completed independently with no DME use  Pt also reports she is not on O2 at home  PCP identified as Dr Timoteo Paul  Pharmacy identified as [facility] in [place]  Pt denies VNA hx  Pt does drive and will have a ride at D/C  Pt denies POA  Pt reports she has had issues with paying for her insulin and blood pressure medications  She stopped taking them in November because of this  Pt did recently switch her insurance which may provide more coverage  CM will obtain scripts and fax them to [facility] to find out a price

## 2018-02-28 NOTE — PHYSICAL THERAPY NOTE
PT EVALUATION    65 y o     [id number]    Pneumonia [J18 9]  SOB (shortness of breath) [R06 02]  Accelerated hypertension [I10]  Hypoxia [R09 02]  Flu-like symptoms [R68 89]    Past Medical History:   Diagnosis Date    Anxiety     Depression     [facility]     Esophageal reflux     28 APR 2015 RESOLVED    Hyperlipidemia     Hypertension     Low back pain     sciatica    Stroke ([facility] ) 12/2015    small vessel, L frontal corona radiata  Rx asa 81, Lipitor 40, risk modification    Vitamin D deficiency          History reviewed  No pertinent surgical history  02/28/18 1023   Note Type   Note type Eval only   Pain Assessment   Pain Assessment No/denies pain   Home Living   Type of [address] One level;Stairs to enter with rails; Laundry in basement  (2 LELO with rails )   [address] bars in shower   [address] (none)   Additional Comments Works at [employer] full time, drives  Prior Function   Level of [place] Independent with ADLs and functional mobility   Lives With Alone   Receives Help From Family  (brother lives next door and + local dtr )   ADL Assistance Independent   IADLs Independent   Falls in the last 6 months 0   Vocational Full time employment   Comments STates brother and his family supportive and dtr can assist upon d/c  Can do her laundry and visit daily if needed  Restrictions/Precautions   Weight Bearing Precautions Per Order No   Other Precautions Fall Risk;Telemetry;O2   General   Additional Pertinent History Pt is 66 y/o female admitted wtih increased SOB   + B/L PNA  Unresponsive episdde requiring emergent intubation 2* hypoxia, lactic acidosis   r/o seizure activity and nuero consulted  Had EEG  PT consulted  Anticipate d/c 24-48 hours     Family/Caregiver Present No   Cognition   Overall Cognitive Status WFL   RUE Assessment   RUE Assessment WFL   LUE Assessment   LUE Assessment WFL RLE Assessment   RLE Assessment WFL   LLE Assessment   LLE Assessment WFL   Coordination   Movements are Fluid and Coordinated 1   Light Touch   RLE Light Touch Grossly intact   LLE Light Touch Grossly intact   Bed Mobility   Supine to Sit 5  Supervision   Additional items Increased time required   Transfers   Sit to Stand 5  Supervision   Additional items Assist x 1; Armrests   Stand to Sit 5  Supervision   Additional items Assist x 1; Increased time required;Armrests   Stand pivot 5  Supervision   Toilet transfer 5  Supervision   Additional items Assist x 1; Increased time required   Additional Comments performs toileting unassisted  Ambulation/Elevation   Gait pattern Decreased foot clearance; Inconsistent lance   Gait Assistance 5  Supervision   Additional items Assist x 1;Verbal cues  (for pacing  O2 at 2L)   Assistive Device (none)   Distance Amb 55'x2, 15'x1  Sats on 2L p ambulation 97%  Mild OREILLY  Balance   Static Sitting Good   Dynamic Sitting Good   Static Standing Fair +   Dynamic Standing Fair   Ambulatory Fair   Endurance Deficit   Endurance Deficit Yes   Endurance Deficit Description fatigue, weakness  Activity Tolerance   Activity Tolerance Patient limited by fatigue   Medical Staff Made Aware Nurse, Janice   Nurse Made Aware yes   Assessment   Prognosis Good   Problem List Decreased strength;Decreased endurance; Impaired balance;Decreased mobility   Assessment Pt is 66 y/o female admitted with B/L pnuemonia, requiring emergent intubation 2* hypoxia, lactic acidosis and r/o seizure 2* unresponsive episode  Now out of ICU and extubated  PT consulted  Presents with decline from baseline mobilty in that has decreased general strength, balance, activity tolerance and mobilty  S for bed mobilty, transfers and ambulation  Gait slowed and wtih inconsistencies given deconditioning from hospitalization and events of same  Currently requiring 2L O2 to maintain sats with mobilty 97%    Anticipate with continued IPPT will progress and be able to d/c to home with family support  PT will follow in order to optimize outcomes p hospitalization but would continue to encourage ambulation for remainder of hospitalization  Barriers to Discharge Decreased caregiver support   Barriers to Discharge Comments lives alone   Goals   Patient Goals go home   STG Expiration Date 03/07/18   Short Term Goal #1 7 days:  I for bed mobilty  Lala for transfers  Amb > 300'x1 with I  [place] steps with S  Improve balance 1/2 grade  Increase activity tolerance to 45 minutes  Treatment Day 0   Plan   Treatment/Interventions Functional transfer training;LE strengthening/ROM; Elevations; Therapeutic exercise; Endurance training;Patient/family training;Equipment eval/education; Bed mobility;Gait training; Compensatory technique education;Continued evaluation;Spoke to nursing   PT Frequency 5x/wk   Recommendation   Recommendation Home with family support   Barthel Index   Feeding 10   Bathing 0   Grooming Score 5   Dressing Score 5   Bladder Score 5   Bowels Score 5   Toilet Use Score 10   Transfers (Bed/Chair) Score 15   Mobility (Level Surface) Score 0   Stairs Score 0   Barthel Index Score 55     History: co - morbidities, fall risk,  assist for adl's,O2, tele, lives alone  Exam: impairments in locomotion,  Balance,posture, pulmonary, barthel 55  Clinical: unstable/unpredictable ( fall risk, new O2 with limitations in activity tolerance, tele)  Complexity:high    Steff Fritz, PT

## 2018-02-28 NOTE — PLAN OF CARE
Problem: PHYSICAL THERAPY ADULT  Goal: Performs mobility at highest level of function for planned discharge setting  See evaluation for individualized goals  Treatment/Interventions: Functional transfer training, LE strengthening/ROM, Elevations, Therapeutic exercise, Endurance training, Patient/family training, Equipment eval/education, Bed mobility, Gait training, Compensatory technique education, Continued evaluation, Spoke to nursing          See flowsheet documentation for full assessment, interventions and recommendations  Prognosis: Good  Problem List: Decreased strength, Decreased endurance, Impaired balance, Decreased mobility  Assessment: Pt is 64 y/o female admitted with B/L pnuemonia, requiring emergent intubation 2* hypoxia, lactic acidosis and r/o seizure 2* unresponsive episode  Now out of ICU and extubated  PT consulted  Presents with decline from baseline mobilty in that has decreased general strength, balance, activity tolerance and mobilty  S for bed mobilty, transfers and ambulation  Gait slowed and wtih inconsistencies given deconditioning from hospitalization and events of same  Currently requiring 2L O2 to maintain sats with mobilty 97%  Anticipate with continued IPPT will progress and be able to d/c to home with family support  PT will follow in order to optimize outcomes p hospitalization but would continue to encourage ambulation for remainder of hospitalization  Barriers to Discharge: Decreased caregiver support  Barriers to Discharge Comments: lives alone  Recommendation: Home with family support          See flowsheet documentation for full assessment

## 2018-02-28 NOTE — PLAN OF CARE
DISCHARGE PLANNING     Discharge to home or other facility with appropriate resources Progressing        INFECTION - ADULT     Absence or prevention of progression during hospitalization Progressing        Knowledge Deficit     Patient/family/caregiver demonstrates understanding of disease process, treatment plan, medications, and discharge instructions Progressing        Nutrition/Hydration-ADULT     Nutrient/Hydration intake appropriate for improving, restoring or maintaining nutritional needs Progressing        PAIN - ADULT     Verbalizes/displays adequate comfort level or baseline comfort level Progressing        Potential for Falls     Patient will remain free of falls Progressing        Prexisting or High Potential for Compromised Skin Integrity     Skin integrity is maintained or improved Progressing        RESPIRATORY - ADULT     Achieves optimal ventilation and oxygenation Progressing        SAFETY ADULT     Maintain or return to baseline ADL function Progressing     Maintain or return mobility status to optimal level Progressing

## 2018-02-28 NOTE — PLAN OF CARE
Problem: DISCHARGE PLANNING - CARE MANAGEMENT  Goal: Discharge to post-acute care or home with appropriate resources  INTERVENTIONS:  - Conduct assessment to determine patient/family and health care team treatment goals, and need for post-acute services based on payer coverage, community resources, and patient preferences, and barriers to discharge  - Address psychosocial, clinical, and financial barriers to discharge as identified in assessment in conjunction with the patient/family and health care team  - Arrange appropriate level of post-acute services according to patients   needs and preference and payer coverage in collaboration with the physician and health care team  - Communicate with and update the patient/family, physician, and health care team regarding progress on the discharge plan  - Arrange appropriate transportation to post-acute venues  Outcome: Progressing  Pt expressed she has been having a hard time paying for her medications and that's why she hasn't taken them since November  She has recently acquired a new insurance which may provide more coverage  CM will fax scripts when obtained to [employer] [employer] to check for a price

## 2018-03-01 LAB
ANION GAP SERPL CALCULATED.3IONS-SCNC: 9 MMOL/L (ref 4–13)
BACTERIA BLD CULT: NORMAL
BACTERIA BLD CULT: NORMAL
BASOPHILS # BLD AUTO: 0.03 THOUSANDS/ΜL (ref 0–0.1)
BASOPHILS NFR BLD AUTO: 1 % (ref 0–1)
BUN SERPL-MCNC: 11 MG/DL (ref 5–25)
CALCIUM SERPL-MCNC: 8.1 MG/DL (ref 8.3–10.1)
CHLORIDE SERPL-SCNC: 101 MMOL/L (ref 100–108)
CO2 SERPL-SCNC: 29 MMOL/L (ref 21–32)
CREAT SERPL-MCNC: 0.53 MG/DL (ref 0.6–1.3)
EOSINOPHIL # BLD AUTO: 0.04 THOUSAND/ΜL (ref 0–0.61)
EOSINOPHIL NFR BLD AUTO: 1 % (ref 0–6)
ERYTHROCYTE [DISTWIDTH] IN BLOOD BY AUTOMATED COUNT: 14.2 % (ref 11.6–15.1)
EST. AVERAGE GLUCOSE BLD GHB EST-MCNC: 229 MG/DL
GFR SERPL CREATININE-BSD FRML MDRD: 106 ML/MIN/1.73SQ M
GLUCOSE SERPL-MCNC: 160 MG/DL (ref 65–140)
GLUCOSE SERPL-MCNC: 203 MG/DL (ref 65–140)
GLUCOSE SERPL-MCNC: 206 MG/DL (ref 65–140)
GLUCOSE SERPL-MCNC: 242 MG/DL (ref 65–140)
GLUCOSE SERPL-MCNC: 279 MG/DL (ref 65–140)
HBA1C MFR BLD: 9.6 % (ref 4.2–6.3)
HCT VFR BLD AUTO: 30.4 % (ref 34.8–46.1)
HGB BLD-MCNC: 9.6 G/DL (ref 11.5–15.4)
LYMPHOCYTES # BLD AUTO: 2.02 THOUSANDS/ΜL (ref 0.6–4.47)
LYMPHOCYTES NFR BLD AUTO: 36 % (ref 14–44)
MCH RBC QN AUTO: 27.2 PG (ref 26.8–34.3)
MCHC RBC AUTO-ENTMCNC: 31.6 G/DL (ref 31.4–37.4)
MCV RBC AUTO: 86 FL (ref 82–98)
MONOCYTES # BLD AUTO: 0.62 THOUSAND/ΜL (ref 0.17–1.22)
MONOCYTES NFR BLD AUTO: 11 % (ref 4–12)
NEUTROPHILS # BLD AUTO: 2.93 THOUSANDS/ΜL (ref 1.85–7.62)
NEUTS SEG NFR BLD AUTO: 51 % (ref 43–75)
NRBC BLD AUTO-RTO: 0 /100 WBCS
PLATELET # BLD AUTO: 193 THOUSANDS/UL (ref 149–390)
PMV BLD AUTO: 9.3 FL (ref 8.9–12.7)
POTASSIUM SERPL-SCNC: 2.9 MMOL/L (ref 3.5–5.3)
RBC # BLD AUTO: 3.53 MILLION/UL (ref 3.81–5.12)
SODIUM SERPL-SCNC: 139 MMOL/L (ref 136–145)
WBC # BLD AUTO: 5.64 THOUSAND/UL (ref 4.31–10.16)

## 2018-03-01 PROCEDURE — 85025 COMPLETE CBC W/AUTO DIFF WBC: CPT | Performed by: INTERNAL MEDICINE

## 2018-03-01 PROCEDURE — 80048 BASIC METABOLIC PNL TOTAL CA: CPT | Performed by: INTERNAL MEDICINE

## 2018-03-01 PROCEDURE — 94640 AIRWAY INHALATION TREATMENT: CPT

## 2018-03-01 PROCEDURE — 82948 REAGENT STRIP/BLOOD GLUCOSE: CPT

## 2018-03-01 PROCEDURE — 99233 SBSQ HOSP IP/OBS HIGH 50: CPT | Performed by: INTERNAL MEDICINE

## 2018-03-01 PROCEDURE — 83036 HEMOGLOBIN GLYCOSYLATED A1C: CPT | Performed by: INTERNAL MEDICINE

## 2018-03-01 PROCEDURE — 94760 N-INVAS EAR/PLS OXIMETRY 1: CPT

## 2018-03-01 RX ORDER — POTASSIUM CHLORIDE 20 MEQ/1
40 TABLET, EXTENDED RELEASE ORAL ONCE
Status: COMPLETED | OUTPATIENT
Start: 2018-03-01 | End: 2018-03-01

## 2018-03-01 RX ADMIN — CHLORHEXIDINE GLUCONATE 15 ML: 1.2 RINSE ORAL at 20:00

## 2018-03-01 RX ADMIN — ALPRAZOLAM 0.5 MG: 0.5 TABLET ORAL at 19:55

## 2018-03-01 RX ADMIN — IPRATROPIUM BROMIDE 0.5 MG: 0.5 SOLUTION RESPIRATORY (INHALATION) at 18:56

## 2018-03-01 RX ADMIN — IPRATROPIUM BROMIDE 0.5 MG: 0.5 SOLUTION RESPIRATORY (INHALATION) at 07:56

## 2018-03-01 RX ADMIN — CEFTRIAXONE 1000 MG: 1 INJECTION, SOLUTION INTRAVENOUS at 22:00

## 2018-03-01 RX ADMIN — AMLODIPINE BESYLATE 10 MG: 10 TABLET ORAL at 08:48

## 2018-03-01 RX ADMIN — CEFTRIAXONE 1000 MG: 1 INJECTION, SOLUTION INTRAVENOUS at 00:52

## 2018-03-01 RX ADMIN — LEVALBUTEROL HYDROCHLORIDE 1.25 MG: 1.25 SOLUTION, CONCENTRATE RESPIRATORY (INHALATION) at 13:56

## 2018-03-01 RX ADMIN — POTASSIUM CHLORIDE 40 MEQ: 1500 TABLET, EXTENDED RELEASE ORAL at 14:45

## 2018-03-01 RX ADMIN — PANTOPRAZOLE SODIUM 40 MG: 40 TABLET, DELAYED RELEASE ORAL at 05:13

## 2018-03-01 RX ADMIN — POTASSIUM CHLORIDE 20 MEQ: 2 INJECTION, SOLUTION, CONCENTRATE INTRAVENOUS at 18:15

## 2018-03-01 RX ADMIN — SERTRALINE HYDROCHLORIDE 100 MG: 100 TABLET ORAL at 08:48

## 2018-03-01 RX ADMIN — POTASSIUM CHLORIDE 20 MEQ: 2 INJECTION, SOLUTION, CONCENTRATE INTRAVENOUS at 16:00

## 2018-03-01 RX ADMIN — ATORVASTATIN CALCIUM 40 MG: 40 TABLET, FILM COATED ORAL at 16:00

## 2018-03-01 RX ADMIN — ASPIRIN 81 MG 81 MG: 81 TABLET ORAL at 08:48

## 2018-03-01 RX ADMIN — LISINOPRIL 40 MG: 20 TABLET ORAL at 08:48

## 2018-03-01 RX ADMIN — IPRATROPIUM BROMIDE 0.5 MG: 0.5 SOLUTION RESPIRATORY (INHALATION) at 13:55

## 2018-03-01 RX ADMIN — AZITHROMYCIN MONOHYDRATE 500 MG: 500 INJECTION, POWDER, LYOPHILIZED, FOR SOLUTION INTRAVENOUS at 20:00

## 2018-03-01 RX ADMIN — LEVALBUTEROL HYDROCHLORIDE 1.25 MG: 1.25 SOLUTION, CONCENTRATE RESPIRATORY (INHALATION) at 07:56

## 2018-03-01 RX ADMIN — LEVALBUTEROL HYDROCHLORIDE 1.25 MG: 1.25 SOLUTION, CONCENTRATE RESPIRATORY (INHALATION) at 18:56

## 2018-03-01 RX ADMIN — METOPROLOL SUCCINATE 100 MG: 50 TABLET, EXTENDED RELEASE ORAL at 08:48

## 2018-03-01 RX ADMIN — ENOXAPARIN SODIUM 40 MG: 40 INJECTION SUBCUTANEOUS at 08:48

## 2018-03-01 NOTE — PHYSICAL THERAPY NOTE
PHYSICAL THERAPY NOTE          Patient Name: Romina Ponce  [id number] Date: 3/1/2018  Pt with potassium of 2 9  Will cx PT and f/u pending receiving potassium      Mar Cough, PT

## 2018-03-01 NOTE — PROGRESS NOTES
[facility] Progress Note  Patient: Tori Giles 65 y o  female   MRN: [id number]  PCP: Vidal Ceron DO  Unit/Bed#: [facility] 2 -01 Encounter: [id number]  Date Of Visit: 02/28/18    Assessment:    Principal Problem:    Pneumonia of both lower lobes due to infectious organism  Active Problems:    Hypertensive urgency    Hyperlipidemia    Esophageal reflux    Type 2 diabetes mellitus with hyperglycemia, without long-term current use of insulin (HCC)    Class 1 obesity due to excess calories with serious comorbidity and body mass index (BMI) of 33 0 to 33 9 in adult    Acute encephalopathy    Acute respiratory failure (HCC)      Plan:  Pneumonia  Clinically improving  Continue Rocephin and azithromycin  Blood cultures no growth to date    Acute hypoxemic respiratory failure  Likely multifactorial in origin  Continue supplemental oxygenation  Will attempt to wean off     diabetes  SSI  Noncompliance with medication regimen due to financial difficulties reported, we'll repeat A1c to see whether we can adjust her medications  Case management aware currently working with patient to obtain appropriate forms for medications  Plan to discharge home on 03/1/18 if clinical course allows      VTE Pharmacologic Prophylaxis:   Pharmacologic: Heparin  Mechanical VTE Prophylaxis in Place: No    Patient Centered Rounds: I have performed bedside rounds with nursing staff today  Discussions with Specialists or Other Care Team Provider:  Yes    Education and Discussions with Family / Patient:  Yes    Time Spent for Care: 45 minutes  More than 50% of total time spent on counseling and coordination of care as described above      Current Length of Stay: 5 day(s)    Current Patient Status: Inpatient   Certification Statement: The patient will continue to require additional inpatient hospital stay due to Pneumonia    Discharge Plan / Estimated Discharge Date:  24-48 hours if clinical course allows    Code Status: Level 1 - Full Code      Subjective:   No complaints    Objective:     Vitals:   Temp (24hrs), Av 5 °F (36 9 °C), Min:97 8 °F (36 6 °C), Max:99 4 °F (37 4 °C)    HR:  [71-90] 80  Resp:  [16-20] 20  BP: (150-154)/(70) 154/70  SpO2:  [90 %-98 %] 93 %  Body mass index is 32 69 kg/m²  Input and Output Summary (last 24 hours): Intake/Output Summary (Last 24 hours) at 18 0187  Last data filed at 18 2253   Gross per 24 hour   Intake              180 ml   Output              800 ml   Net             -620 ml       Physical Exam:     Physical Exam  Constitutional:  Well developed, well nourished, no acute distress, non-toxic appearance   Eyes:  PERRL, conjunctiva normal   HENT:  Atraumatic, external ears normal, nose normal, oropharynx moist, no pharyngeal exudates  Neck- normal range of motion, no tenderness, supple   Respiratory:  No respiratory distress, normal breath sounds, no rales, no wheezing   Cardiovascular:  Normal rate, normal rhythm, no murmurs, no gallops, no rubs   GI:  Soft, nondistended, normal bowel sounds, nontender, no organomegaly, no mass, no rebound, no guarding   :  No costovertebral angle tenderness   Musculoskeletal:  No edema, no tenderness, no deformities   Back- no tenderness  Integument:  Well hydrated, no rash   Lymphatic:  No lymphadenopathy noted   Neurologic:  Alert & oriented x 3, CN 2-12 normal, normal motor function, normal sensory function, no focal deficits noted   Psychiatric:  Speech and behavior appropriate  Additional Data:     Labs:      Results from last 7 days  Lab Units 18  0443   WBC Thousand/uL 14 10*   HEMOGLOBIN g/dL 12 9   HEMATOCRIT % 39 2   PLATELETS Thousands/uL 199   NEUTROS PCT % 86*   LYMPHS PCT % 6*   MONOS PCT % 8   EOS PCT % 0       Results from last 7 days  Lab Units 18  0437  18  1823   SODIUM mmol/L 136  < > 138   POTASSIUM mmol/L 3 9  < > 3 0*   CHLORIDE mmol/L 101  < > 99*   CO2 mmol/L 24  < > 26   BUN mg/dL 12  < > 9   CREATININE mg/dL 0 61  < > 0 64   CALCIUM mg/dL 8 5  < > 9 0   TOTAL PROTEIN g/dL  --   --  7 6   BILIRUBIN TOTAL mg/dL  --   --  0 54   ALK PHOS U/L  --   --  219*   ALT U/L  --   --  33   AST U/L  --   --  20   GLUCOSE RANDOM mg/dL 297*  < > 290*   < > = values in this interval not displayed  Results from last 7 days  Lab Units 02/24/18  2252   INR  1 11       * I Have Reviewed All Lab Data Listed Above  * Additional Pertinent Lab Tests Reviewed: [facility] Admission Reviewed    Imaging:    Imaging Reports Reviewed Today Include:  Yes  Imaging Personally Reviewed by Myself Includes:  Yes    Recent Cultures (last 7 days):       Results from last 7 days  Lab Units 02/23/18  2130 02/23/18  1823 02/23/18  1719 02/23/18  0915   BLOOD CULTURE  No Growth After 4 Days  --   --  No Growth After 4 Days     INFLUENZA A PCR   --  None Detected  --   --    INFLUENZA B PCR   --  None Detected  --   --    RSV PCR   --  None Detected  --   --    LEGIONELLA URINARY ANTIGEN   --   --  Negative  --        Last 24 Hours Medication List:     Current Facility-Administered Medications:  acetaminophen 650 mg Oral Q6H PRN ALEX Paul    ALPRAZolam 0 5 mg Oral HS PRN ALEX Ashby    amLODIPine 10 mg Oral Daily Cecilio Joseph, ALEX    aspirin 81 mg Oral Daily Cecilio Joseph, CRPILI    atorvastatin 40 mg Oral Daily With [employer], ALEX    azithromycin 500 mg Intravenous Q24H ALEX Paul Last Rate: 500 mg (02/28/18 2105)   cefTRIAXone 1,000 mg Intravenous Q24H Angeles Baltazar PA-C Last Rate: Stopped (02/27/18 2318)   chlorhexidine 15 mL Swish & Spit Q12H [address] ALEX De Anda    enoxaparin 40 mg Subcutaneous Daily Angeles Baltazar PA-C    fentanyl citrate (PF) 25 mcg Intravenous Q1H PRN ALEX De Anda    hydrALAZINE 5 mg Intravenous Q6H PRN Javon Car MD    insulin lispro 1-5 Units Subcutaneous Q6H [address] ALEX Paul    ipratropium 0 5 mg Nebulization TID Mindi Dietz DO    levalbuterol 1 25 mg Nebulization TID Mindi Dietz DO    lisinopril 40 mg Oral Daily ALEX Hernández    metoprolol succinate 100 mg Oral Daily ALEX Castillo    ondansetron 8 mg Intravenous Q8H PRN ALEX Ge    pantoprazole 40 mg Oral Early Morning ALEX Hernández    sertraline 100 mg Oral Daily ALEX Hernández         Today, Patient Was Seen By: Genaro Zendejas MD    ** Please Note: This note has been constructed using a voice recognition system   **

## 2018-03-01 NOTE — PROGRESS NOTES
Agree with previous RN's assessment except lung sounds are diminished on RA  Pt resting comfortably in bed, IV potassium running  Pt offers no complaints at this time  Will continue to monitor

## 2018-03-01 NOTE — PLAN OF CARE
DISCHARGE PLANNING     Discharge to home or other facility with appropriate resources Progressing        DISCHARGE PLANNING - CARE MANAGEMENT     Discharge to post-acute care or home with appropriate resources Progressing        INFECTION - ADULT     Absence or prevention of progression during hospitalization Progressing        Knowledge Deficit     Patient/family/caregiver demonstrates understanding of disease process, treatment plan, medications, and discharge instructions Progressing        Nutrition/Hydration-ADULT     Nutrient/Hydration intake appropriate for improving, restoring or maintaining nutritional needs Progressing        PAIN - ADULT     Verbalizes/displays adequate comfort level or baseline comfort level Progressing        Potential for Falls     Patient will remain free of falls Progressing        Prexisting or High Potential for Compromised Skin Integrity     Skin integrity is maintained or improved Progressing        RESPIRATORY - ADULT     Achieves optimal ventilation and oxygenation Progressing        SAFETY ADULT     Maintain or return to baseline ADL function Progressing     Maintain or return mobility status to optimal level Progressing

## 2018-03-01 NOTE — PROGRESS NOTES
[facility] Progress Note  Patient: Al Baltazar 65 y o  female   MRN: [id number]  PCP: Angeles Porras DO  Unit/Bed#: [facility] 68 2 -01 Encounter: [id number]  Date Of Visit: 03/01/18    Assessment:    Principal Problem:    Pneumonia of both lower lobes due to infectious organism  Active Problems:    Hypertensive urgency    Hyperlipidemia    Esophageal reflux    Type 2 diabetes mellitus with hyperglycemia, without long-term current use of insulin ([facility])    Class 1 obesity due to excess calories with serious comorbidity and body mass index (BMI) of 33 0 to 33 9 in adult    Acute encephalopathy    Acute respiratory failure (HCC)      Plan:  Pneumonia  Clinically improving  Continue Rocephin and azithromycin date 6/7 due to severely of pneumonia continue 2 more day of IV antibiotics  Blood cultures no growth to date    Acute hypoxemic respiratory failure  Likely multifactorial in origin  Continue supplemental oxygenation  Will attempt to wean off     diabetes  SSI  Noncompliance with medication regimen due to financial difficulties reported, we'll repeat A1c to see whether we can adjust her medications  Case management aware currently working with patient to obtain appropriate forms for medications  patient is agreeable to restart her medication regimen was more affordable medications will use [employer]/$[facility] prescription regimen if possible  Plan to discharge home on 03/3/18 if clinical course allows      VTE Pharmacologic Prophylaxis:   Pharmacologic: Heparin  Mechanical VTE Prophylaxis in Place: No    Patient Centered Rounds: I have performed bedside rounds with nursing staff today  Discussions with Specialists or Other Care Team Provider:  Yes    Education and Discussions with Family / Patient:  Yes    Time Spent for Care: 45 minutes  More than 50% of total time spent on counseling and coordination of care as described above      Current Length of Stay: 6 day(s)    Current Patient Status: Inpatient   Certification Statement: The patient will continue to require additional inpatient hospital stay due to Pneumonia    Discharge Plan / Estimated Discharge Date:  24-48 hours if clinical course allows    Code Status: Level 1 - Full Code      Subjective:   No complaints  No events overnight  Objective:     Vitals:   Temp (24hrs), Av 4 °F (36 9 °C), Min:97 1 °F (36 2 °C), Max:99 4 °F (37 4 °C)    HR:  [70-90] 70  Resp:  [18-20] 18  BP: (150-172)/(70-84) 172/84  SpO2:  [90 %-97 %] 90 %  Body mass index is 32 49 kg/m²  Input and Output Summary (last 24 hours): Intake/Output Summary (Last 24 hours) at 18 1435  Last data filed at 18 1301   Gross per 24 hour   Intake              180 ml   Output             1500 ml   Net            -1320 ml       Physical Exam:     Physical Exam  Constitutional:  Well developed, well nourished, no acute distress, non-toxic appearance   Eyes:  PERRL, conjunctiva normal   HENT:  Atraumatic, external ears normal, nose normal, oropharynx moist, no pharyngeal exudates  Neck- normal range of motion, no tenderness, supple   Respiratory:  No respiratory distress, normal breath sounds, no rales, no wheezing   Cardiovascular:  Normal rate, normal rhythm, no murmurs, no gallops, no rubs   GI:  Soft, nondistended, normal bowel sounds, nontender, no organomegaly, no mass, no rebound, no guarding   :  No costovertebral angle tenderness   Musculoskeletal:  No edema, no tenderness, no deformities   Back- no tenderness  Integument:  Well hydrated, no rash   Lymphatic:  No lymphadenopathy noted   Neurologic:  Alert & oriented x 3, CN 2-12 normal, normal motor function, normal sensory function, no focal deficits noted   Psychiatric:  Speech and behavior appropriate  Additional Data:     Labs:      Results from last 7 days  Lab Units 18  0443   WBC Thousand/uL 5 64   HEMOGLOBIN g/dL 9 6*   HEMATOCRIT % 30 4*   PLATELETS Thousands/uL 193   NEUTROS PCT % 51   LYMPHS PCT % 36   MONOS PCT % 11   EOS PCT % 1       Results from last 7 days  Lab Units 03/01/18  0443  02/23/18  1823   SODIUM mmol/L 139  < > 138   POTASSIUM mmol/L 2 9*  < > 3 0*   CHLORIDE mmol/L 101  < > 99*   CO2 mmol/L 29  < > 26   BUN mg/dL 11  < > 9   CREATININE mg/dL 0 53*  < > 0 64   CALCIUM mg/dL 8 1*  < > 9 0   TOTAL PROTEIN g/dL  --   --  7 6   BILIRUBIN TOTAL mg/dL  --   --  0 54   ALK PHOS U/L  --   --  219*   ALT U/L  --   --  33   AST U/L  --   --  20   GLUCOSE RANDOM mg/dL 160*  < > 290*   < > = values in this interval not displayed  Results from last 7 days  Lab Units 02/24/18  2252   INR  1 11       * I Have Reviewed All Lab Data Listed Above  * Additional Pertinent Lab Tests Reviewed: Nilda [facility] Admission Reviewed    Imaging:    Imaging Reports Reviewed Today Include:  Yes  Imaging Personally Reviewed by Myself Includes:  Yes    Recent Cultures (last 7 days):       Results from last 7 days  Lab Units 02/23/18  2130 02/23/18  1823 02/23/18  1719 02/23/18  0915   BLOOD CULTURE  No Growth After 5 Days  --   --  No Growth After 5 Days     INFLUENZA A PCR   --  None Detected  --   --    INFLUENZA B PCR   --  None Detected  --   --    RSV PCR   --  None Detected  --   --    LEGIONELLA URINARY ANTIGEN   --   --  Negative  --        Last 24 Hours Medication List:     Current Facility-Administered Medications:  acetaminophen 650 mg Oral Q6H PRN ALEX Taylor    ALPRAZolam 0 5 mg Oral HS PRN ALEX Mueller    amLODIPine 10 mg Oral Daily Lorence Flick, ALEX    aspirin 81 mg Oral Daily Lorence Flick, ALEX    atorvastatin 40 mg Oral Daily With [employer]    azithromycin 500 mg Intravenous Q24H ALEX Taylor Last Rate: Stopped (02/28/18 2253)   cefTRIAXone 1,000 mg Intravenous Q24H Genaro Farnsworth PA-C Last Rate: 1,000 mg (03/01/18 0052)   chlorhexidine 15 mL Swish & Spit Q12H [address] Ravindra Legions, CRNP    dextromethorphan-guaiFENesin 10 mL Oral Q4H PRN Dean Denis Shubham Duffy MD    enoxaparin 40 mg Subcutaneous Daily Asher Salmeron PA-C    fentanyl citrate (PF) 25 mcg Intravenous Q1H PRN ALEX Fabian    hydrALAZINE 5 mg Intravenous Q6H PRN Luz Rutherford MD    insulin lispro 1-5 Units Subcutaneous Q6H [address] Fawn Peto, ALEX    ipratropium 0 5 mg Nebulization TID Mindi Ambron, DO    levalbuterol 1 25 mg Nebulization TID Mindi Ambron, DO    lisinopril 40 mg Oral Daily Sherrie Newton, CRPILI    metoprolol succinate 100 mg Oral Daily Sherrie Newton, ALEX    ondansetron 8 mg Intravenous Q8H PRN ALEX Good    pantoprazole 40 mg Oral Early Morning ALEX Castillo    potassium chloride 40 mEq Oral Once Meredith Mayes MD    potassium chloride 40 mEq Intravenous Once Meredith Mayes MD    sertraline 100 mg Oral Daily Sherrie Newton, CRNP         Today, Patient Was Seen By: Meredith Mayes MD    ** Please Note: This note has been constructed using a voice recognition system   **

## 2018-03-02 VITALS
DIASTOLIC BLOOD PRESSURE: 84 MMHG | TEMPERATURE: 98 F | HEIGHT: 63 IN | SYSTOLIC BLOOD PRESSURE: 179 MMHG | WEIGHT: 183.42 LBS | HEART RATE: 74 BPM | RESPIRATION RATE: 18 BRPM | OXYGEN SATURATION: 95 % | BODY MASS INDEX: 32.5 KG/M2

## 2018-03-02 PROBLEM — F32.A DEPRESSION: Status: ACTIVE | Noted: 2018-03-02

## 2018-03-02 LAB
ANION GAP SERPL CALCULATED.3IONS-SCNC: 8 MMOL/L (ref 4–13)
BASOPHILS # BLD MANUAL: 0 THOUSAND/UL (ref 0–0.1)
BASOPHILS NFR MAR MANUAL: 0 % (ref 0–1)
BUN SERPL-MCNC: 10 MG/DL (ref 5–25)
CALCIUM SERPL-MCNC: 8.5 MG/DL (ref 8.3–10.1)
CHLORIDE SERPL-SCNC: 102 MMOL/L (ref 100–108)
CO2 SERPL-SCNC: 29 MMOL/L (ref 21–32)
CREAT SERPL-MCNC: 0.52 MG/DL (ref 0.6–1.3)
EOSINOPHIL # BLD MANUAL: 0 THOUSAND/UL (ref 0–0.4)
EOSINOPHIL NFR BLD MANUAL: 0 % (ref 0–6)
ERYTHROCYTE [DISTWIDTH] IN BLOOD BY AUTOMATED COUNT: 14.1 % (ref 11.6–15.1)
GFR SERPL CREATININE-BSD FRML MDRD: 107 ML/MIN/1.73SQ M
GLUCOSE SERPL-MCNC: 188 MG/DL (ref 65–140)
GLUCOSE SERPL-MCNC: 191 MG/DL (ref 65–140)
GLUCOSE SERPL-MCNC: 218 MG/DL (ref 65–140)
GLUCOSE SERPL-MCNC: 237 MG/DL (ref 65–140)
HCT VFR BLD AUTO: 32 % (ref 34.8–46.1)
HGB BLD-MCNC: 10.1 G/DL (ref 11.5–15.4)
LYMPHOCYTES # BLD AUTO: 1.98 THOUSAND/UL (ref 0.6–4.47)
LYMPHOCYTES # BLD AUTO: 25 % (ref 14–44)
MCH RBC QN AUTO: 27.2 PG (ref 26.8–34.3)
MCHC RBC AUTO-ENTMCNC: 31.6 G/DL (ref 31.4–37.4)
MCV RBC AUTO: 86 FL (ref 82–98)
MONOCYTES # BLD AUTO: 0.55 THOUSAND/UL (ref 0–1.22)
MONOCYTES NFR BLD: 7 % (ref 4–12)
NEUTROPHILS # BLD MANUAL: 5.38 THOUSAND/UL (ref 1.85–7.62)
NEUTS SEG NFR BLD AUTO: 68 % (ref 43–75)
NRBC BLD AUTO-RTO: 0 /100 WBCS
PLATELET # BLD AUTO: 214 THOUSANDS/UL (ref 149–390)
PLATELET BLD QL SMEAR: ADEQUATE
PMV BLD AUTO: 9.6 FL (ref 8.9–12.7)
POTASSIUM SERPL-SCNC: 3.5 MMOL/L (ref 3.5–5.3)
RBC # BLD AUTO: 3.71 MILLION/UL (ref 3.81–5.12)
RBC MORPH BLD: NORMAL
SODIUM SERPL-SCNC: 139 MMOL/L (ref 136–145)
TOTAL CELLS COUNTED SPEC: 100
WBC # BLD AUTO: 7.91 THOUSAND/UL (ref 4.31–10.16)

## 2018-03-02 PROCEDURE — 85007 BL SMEAR W/DIFF WBC COUNT: CPT | Performed by: INTERNAL MEDICINE

## 2018-03-02 PROCEDURE — 94760 N-INVAS EAR/PLS OXIMETRY 1: CPT

## 2018-03-02 PROCEDURE — 99239 HOSP IP/OBS DSCHRG MGMT >30: CPT | Performed by: INTERNAL MEDICINE

## 2018-03-02 PROCEDURE — 85027 COMPLETE CBC AUTOMATED: CPT | Performed by: INTERNAL MEDICINE

## 2018-03-02 PROCEDURE — 80048 BASIC METABOLIC PNL TOTAL CA: CPT | Performed by: INTERNAL MEDICINE

## 2018-03-02 PROCEDURE — 82948 REAGENT STRIP/BLOOD GLUCOSE: CPT

## 2018-03-02 PROCEDURE — 94640 AIRWAY INHALATION TREATMENT: CPT

## 2018-03-02 RX ORDER — LISINOPRIL 40 MG/1
40 TABLET ORAL DAILY
Qty: 30 TABLET | Refills: 0 | Status: SHIPPED | OUTPATIENT
Start: 2018-03-02 | End: 2018-03-28 | Stop reason: SDUPTHER

## 2018-03-02 RX ORDER — ATORVASTATIN CALCIUM 40 MG/1
40 TABLET, FILM COATED ORAL DAILY
Qty: 15 TABLET | Refills: 0 | Status: SHIPPED | OUTPATIENT
Start: 2018-03-02 | End: 2018-03-28 | Stop reason: SDUPTHER

## 2018-03-02 RX ORDER — AMLODIPINE BESYLATE 10 MG/1
10 TABLET ORAL DAILY
Qty: 15 TABLET | Refills: 0 | Status: SHIPPED | OUTPATIENT
Start: 2018-03-02 | End: 2018-03-28 | Stop reason: SDUPTHER

## 2018-03-02 RX ORDER — AMLODIPINE BESYLATE 10 MG/1
10 TABLET ORAL DAILY
Qty: 15 TABLET | Refills: 0 | Status: SHIPPED | OUTPATIENT
Start: 2018-03-02 | End: 2018-03-02

## 2018-03-02 RX ORDER — METOPROLOL SUCCINATE 100 MG/1
100 TABLET, EXTENDED RELEASE ORAL DAILY
Qty: 30 TABLET | Refills: 0 | Status: SHIPPED | OUTPATIENT
Start: 2018-03-02 | End: 2018-03-02

## 2018-03-02 RX ORDER — METOPROLOL SUCCINATE 100 MG/1
100 TABLET, EXTENDED RELEASE ORAL DAILY
Qty: 30 TABLET | Refills: 0 | Status: SHIPPED | OUTPATIENT
Start: 2018-03-02 | End: 2018-03-28 | Stop reason: SDUPTHER

## 2018-03-02 RX ADMIN — METOPROLOL SUCCINATE 100 MG: 50 TABLET, EXTENDED RELEASE ORAL at 08:26

## 2018-03-02 RX ADMIN — SERTRALINE HYDROCHLORIDE 100 MG: 100 TABLET ORAL at 08:26

## 2018-03-02 RX ADMIN — IPRATROPIUM BROMIDE 0.5 MG: 0.5 SOLUTION RESPIRATORY (INHALATION) at 13:41

## 2018-03-02 RX ADMIN — HYDRALAZINE HYDROCHLORIDE 5 MG: 20 INJECTION INTRAMUSCULAR; INTRAVENOUS at 11:12

## 2018-03-02 RX ADMIN — LEVALBUTEROL HYDROCHLORIDE 1.25 MG: 1.25 SOLUTION, CONCENTRATE RESPIRATORY (INHALATION) at 13:41

## 2018-03-02 RX ADMIN — LISINOPRIL 40 MG: 20 TABLET ORAL at 08:26

## 2018-03-02 RX ADMIN — LEVALBUTEROL HYDROCHLORIDE 1.25 MG: 1.25 SOLUTION, CONCENTRATE RESPIRATORY (INHALATION) at 08:08

## 2018-03-02 RX ADMIN — PANTOPRAZOLE SODIUM 40 MG: 40 TABLET, DELAYED RELEASE ORAL at 05:15

## 2018-03-02 RX ADMIN — AMLODIPINE BESYLATE 10 MG: 10 TABLET ORAL at 08:26

## 2018-03-02 RX ADMIN — ENOXAPARIN SODIUM 40 MG: 40 INJECTION SUBCUTANEOUS at 08:26

## 2018-03-02 RX ADMIN — ATORVASTATIN CALCIUM 40 MG: 40 TABLET, FILM COATED ORAL at 16:10

## 2018-03-02 RX ADMIN — IPRATROPIUM BROMIDE 0.5 MG: 0.5 SOLUTION RESPIRATORY (INHALATION) at 08:08

## 2018-03-02 RX ADMIN — ASPIRIN 81 MG 81 MG: 81 TABLET ORAL at 08:26

## 2018-03-02 NOTE — CASE MANAGEMENT
Continued Stay Review    Date:  3/2/2018    Vital Signs: BP (!) 179/84   Pulse 74   Temp 98 °F (36 7 °C) (Temporal)   Resp 18   Ht 5' 3" (1 6 m)   Wt 83 2 kg (183 lb 6 8 oz)   SpO2 92%   BMI 32 49 kg/m²       RA 92%    Medications:   Scheduled Meds:   Current Facility-Administered Medications:  acetaminophen 650 mg Oral Q6H PRN Luiza Hicks, BENNP    ALPRAZolam 0 5 mg Oral HS PRN Jemal Cochran, ALEX    amLODIPine 10 mg Oral Daily Cecilio Joseph, CRNP    aspirin 81 mg Oral Daily Cecilio Joseph, CRNP    atorvastatin 40 mg Oral Daily With [employer], CRNP    azithromycin 500 mg Intravenous Q24H ALEX Paul Last Rate: 0 mg (02/28/18 2253)   cefTRIAXone 1,000 mg Intravenous Q24H Angeles Baltazar PA-C Last Rate: 1,000 mg (03/01/18 2200)   chlorhexidine 15 mL Swish & Spit Q12H [address] Janet Pedro, ALEX    dextromethorphan-guaiFENesin 10 mL Oral Q4H PRN Christine Garvin MD    enoxaparin 40 mg Subcutaneous Daily Angeles Baltazar PA-C    fentanyl citrate (PF) 25 mcg Intravenous Q1H PRN ALEX De Anda    hydrALAZINE 5 mg Intravenous Q6H PRN Javon Car MD    insulin lispro 1-5 Units Subcutaneous Q6H [address] ALEX Paul    ipratropium 0 5 mg Nebulization TID Mindi Ambron, DO    levalbuterol 1 25 mg Nebulization TID Mindi Ambron, DO    lisinopril 40 mg Oral Daily Cecilio Joseph, BENNP    metoprolol succinate 100 mg Oral Daily Cecilio Joseph, ALEX    ondansetron 8 mg Intravenous Q8H PRN Jemal Cochran, ALEX    pantoprazole 40 mg Oral Early Morning Cecilio Joseph, CRNP    sertraline 100 mg Oral Daily Cecilio Joseph, BENNP      Continuous Infusions:    PRN Meds:   acetaminophen    ALPRAZolam    dextromethorphan-guaiFENesin    fentanyl citrate (PF)    hydrALAZINE  IV X 1  3/2    ondansetron    Abnormal Labs/Diagnostic Results:   Glu 188  To 237  ,   H&H 10 1 / 32    Age/Sex: 65 y o  female     Per ATTENDING NOTE  3/1  Assessment/Plan: Pneumonia  Clinically improving  Continue Rocephin and azithromycin date 6/7 due to severely of pneumonia continue 2 more day of IV antibiotics  Blood cultures no growth to date     Acute hypoxemic respiratory failure  Likely multifactorial in origin  Continue supplemental oxygenation  Will attempt to wean off      diabetes  SSI  Noncompliance with medication regimen due to financial difficulties reported, we'll repeat A1c to see whether we can adjust her medications  Case management aware currently working with patient to obtain appropriate forms for medications  patient is agreeable to restart her medication regimen was more affordable medications will use [employer]/$4 prescription regimen if possible      Plan to discharge home on 03/3/18 if clinical course allows      Thank you,  [address] [place] [address] in the [facility] for 2017  Network Utilization Review Department  Phone: [phone]; Fax [phone]  ATTENTION: The Network Utilization Review Department is now centralized for our 7 Facilities  Make a note that we have a new phone and fax numbers for our Department  Please call with any questions or concerns to [phone] and carefully follow the prompts so that you are directed to the right person  All voicemails are confidential  Fax any determinations, approvals, denials, and requests for initial or continue stay review clinical to [phone]   Due to HIGH CALL volume, it would be easier if you could please send faxed requests to expedite your requests and in part, help us provide discharge notifications faster   '

## 2018-03-05 NOTE — DISCHARGE SUMMARY
Discharge Summary - [facility]    Patient Information: Hannah Steiner 65 y o  female MRN: [id number]  Unit/Bed#: [address] 203-01 Encounter: [id number]    Discharging Physician / Practitioner: Karmen Garcia MD  PCP: Tootie Coronado DO  Admission Date: 2/23/2018  Discharge Date:   03/02/2018  Disposition:     Home    Reason for Admission: pneumonia    Discharge Diagnoses:     Principal Problem:    Pneumonia of both lower lobes due to infectious organism  Active Problems:    Hypertensive urgency    Hyperlipidemia    Esophageal reflux    Type 2 diabetes mellitus with hyperglycemia, without long-term current use of insulin ([facility])    Class 1 obesity due to excess calories with serious comorbidity and body mass index (BMI) of 33 0 to 33 9 in adult    Acute encephalopathy    [facility] )    Depression  Resolved Problems:    * No resolved hospital problems  *      Consultations During Hospital Stay:  · none    Procedures Performed:     · none    Significant Findings / Test Results:     · Bilateral pneumonia    Incidental Findings:   · none     Test Results Pending at Discharge (will require follow up): · Follow-up with PCP ×1 week     Outpatient Tests Requested:  ·      Complications:  none    Hospital Course:     Hannah Steiner is a 65 y o  female who presents with shortness of breath getting worse for 2 weeks  She has associated cough and recent vomiting and diarrhea  She has had a fever  She quit smoking 10 years ago  She denies sick contacts  She stopped all of her medications in November because she couldn't afford them  She started taking the zoloft again last week  patient was subsequently admitted for treatment of community acquired pneumonia, blood cultures have been obtained no gross to date  Patient was treated with Rocephin and azithromycin for a total of 7 days and finish antibiotic course    At this time she is hemodynamically stable and reports no further cough shortness of breath fever chills or any other symptoms  She was subsequently discharged home in stable condition recommended follow-up with PCP ×1 week and return to ED if chest pain shortness of breath fever chills increasing cough and dyspnea on exertion any changes in bowel or urinary habits  Family/patient expressed verbal understanding      Condition at Discharge: good     Discharge Day Visit / Exam:     Subjective:  No complaints  Vitals: Blood Pressure: (!) 179/84 (03/02/18 1112)  Pulse: 74 (03/02/18 1110)  Temperature: 98 °F (36 7 °C) (03/02/18 0737)  Temp Source: Temporal (03/02/18 0737)  Respirations: 18 (03/02/18 1110)  Height: 5' 3" (160 cm) (02/25/18 0049)  Weight - Scale: 83 2 kg (183 lb 6 8 oz) (03/02/18 0600)  SpO2: 95 % (03/02/18 1341)  Exam:   Physical Exam  GENERAL APPEARANCE: WD/WN in NAD pleasant  SKIN: no rash  HEENT: NC/AT, PERRLA (B), moist MM, no epistaxis  NECK: Supple, no JVD    LUNGS: CTA (B) mildly prolonged expiratory phase,   no use of accessory muscles  HEART:          S1S 2, RRR  , PMI is not displaced  ABDOMEN: Soft, nontender, nondistended, +BS  Rectal exam:  EXTREMITIES: no edema   PERIPHERAL VASCULAR: palpable pulses   NEURO:  AAO x 3, CN 2-12: non focal  MUSCLE STRENGHT: 5/5 (B), SENSATION: nonfocal  DTR: ++, CEREBELLAR: non focal  Discussion with Family: Yes    Discharge instructions/Information to patient and family:   See after visit summary for information provided to patient and family  Provisions for Follow-Up Care:  See after visit summary for information related to follow-up care and any pertinent home health orders  Planned Readmission: no     Discharge Statement:  I spent 40 minutes discharging the patient  This time was spent on the day of discharge  I had direct contact with the patient on the day of discharge   Greater than 50% of the total time was spent examining patient, answering all patient questions, arranging and discussing plan of care with patient as well as directly providing post-discharge instructions  Additional time then spent on discharge activities  Discharge Medications:  See after visit summary for reconciled discharge medications provided to patient and family        ** Please Note: This note has been constructed using a voice recognition system **

## 2018-03-28 ENCOUNTER — OFFICE VISIT (OUTPATIENT)
Dept: FAMILY MEDICINE CLINIC | Facility: CLINIC | Age: 58
End: 2018-03-28
Payer: COMMERCIAL

## 2018-03-28 VITALS
TEMPERATURE: 98.3 F | WEIGHT: 196.2 LBS | HEIGHT: 63 IN | HEART RATE: 85 BPM | SYSTOLIC BLOOD PRESSURE: 218 MMHG | RESPIRATION RATE: 15 BRPM | DIASTOLIC BLOOD PRESSURE: 80 MMHG | OXYGEN SATURATION: 97 % | BODY MASS INDEX: 34.76 KG/M2

## 2018-03-28 DIAGNOSIS — I16.0 HYPERTENSIVE URGENCY: ICD-10-CM

## 2018-03-28 DIAGNOSIS — J18.9 PNEUMONIA OF LEFT LOWER LOBE DUE TO INFECTIOUS ORGANISM: Primary | ICD-10-CM

## 2018-03-28 DIAGNOSIS — E78.49 FAMILIAL COMBINED HYPERLIPIDEMIA: ICD-10-CM

## 2018-03-28 DIAGNOSIS — IMO0002 UNCONTROLLED TYPE 2 DIABETES WITH NEUROPATHY: ICD-10-CM

## 2018-03-28 DIAGNOSIS — E78.2 MIXED HYPERLIPIDEMIA: ICD-10-CM

## 2018-03-28 DIAGNOSIS — E11.42 DIABETIC PERIPHERAL NEUROPATHY (HCC): ICD-10-CM

## 2018-03-28 DIAGNOSIS — F32.A DEPRESSION, UNSPECIFIED DEPRESSION TYPE: ICD-10-CM

## 2018-03-28 DIAGNOSIS — F41.9 ANXIETY: ICD-10-CM

## 2018-03-28 PROBLEM — E78.5 HYPERLIPIDEMIA: Status: RESOLVED | Noted: 2018-02-23 | Resolved: 2018-03-28

## 2018-03-28 PROBLEM — R01.1 SYSTOLIC MURMUR: Status: ACTIVE | Noted: 2017-05-08

## 2018-03-28 PROBLEM — M54.42 LEFT-SIDED LOW BACK PAIN WITH SCIATICA: Status: ACTIVE | Noted: 2017-05-08

## 2018-03-28 PROCEDURE — 99214 OFFICE O/P EST MOD 30 MIN: CPT | Performed by: PHYSICIAN ASSISTANT

## 2018-03-28 PROCEDURE — 3046F HEMOGLOBIN A1C LEVEL >9.0%: CPT | Performed by: PHYSICIAN ASSISTANT

## 2018-03-28 RX ORDER — LISINOPRIL 10 MG/1
10 TABLET ORAL
COMMUNITY
Start: 2015-12-16 | End: 2018-03-28 | Stop reason: DRUGHIGH

## 2018-03-28 RX ORDER — LISINOPRIL 40 MG/1
40 TABLET ORAL DAILY
Qty: 90 TABLET | Refills: 1 | Status: SHIPPED | OUTPATIENT
Start: 2018-03-28 | End: 2018-10-04 | Stop reason: SDUPTHER

## 2018-03-28 RX ORDER — ATORVASTATIN CALCIUM 40 MG/1
40 TABLET, FILM COATED ORAL
COMMUNITY
Start: 2015-12-16 | End: 2018-03-28 | Stop reason: SDUPTHER

## 2018-03-28 RX ORDER — ALPRAZOLAM 0.5 MG/1
0.5 TABLET ORAL 3 TIMES DAILY PRN
Qty: 90 TABLET | Refills: 0 | Status: SHIPPED | OUTPATIENT
Start: 2018-03-28 | End: 2018-07-02 | Stop reason: SDUPTHER

## 2018-03-28 RX ORDER — ALPRAZOLAM 0.5 MG/1
0.5 TABLET ORAL 3 TIMES DAILY
COMMUNITY
End: 2018-03-28 | Stop reason: SDUPTHER

## 2018-03-28 RX ORDER — METOPROLOL SUCCINATE 25 MG/1
25 TABLET, EXTENDED RELEASE ORAL
COMMUNITY
Start: 2015-12-16 | End: 2018-03-28 | Stop reason: DRUGHIGH

## 2018-03-28 RX ORDER — ATORVASTATIN CALCIUM 40 MG/1
40 TABLET, FILM COATED ORAL DAILY
Qty: 90 TABLET | Refills: 1 | Status: SHIPPED | OUTPATIENT
Start: 2018-03-28 | End: 2018-03-28 | Stop reason: SDUPTHER

## 2018-03-28 RX ORDER — ASPIRIN 81 MG/1
81 TABLET, CHEWABLE ORAL
COMMUNITY
Start: 2015-12-15

## 2018-03-28 RX ORDER — ATORVASTATIN CALCIUM 40 MG/1
40 TABLET, FILM COATED ORAL DAILY
Qty: 90 TABLET | Refills: 0 | Status: SHIPPED | OUTPATIENT
Start: 2018-03-28 | End: 2018-06-26 | Stop reason: SDUPTHER

## 2018-03-28 RX ORDER — METOPROLOL SUCCINATE 100 MG/1
100 TABLET, EXTENDED RELEASE ORAL DAILY
Qty: 90 TABLET | Refills: 1 | Status: SHIPPED | OUTPATIENT
Start: 2018-03-28 | End: 2018-10-04 | Stop reason: SDUPTHER

## 2018-03-28 RX ORDER — BLOOD SUGAR DIAGNOSTIC
STRIP MISCELLANEOUS
COMMUNITY
Start: 2015-12-17 | End: 2019-10-01 | Stop reason: SDUPTHER

## 2018-03-28 RX ORDER — AMLODIPINE BESYLATE 10 MG/1
10 TABLET ORAL DAILY
Qty: 90 TABLET | Refills: 1 | Status: SHIPPED | OUTPATIENT
Start: 2018-03-28 | End: 2018-10-20 | Stop reason: SDUPTHER

## 2018-03-28 RX ORDER — METOPROLOL SUCCINATE 100 MG/1
100 TABLET, EXTENDED RELEASE ORAL DAILY
Qty: 90 TABLET | Refills: 1 | Status: SHIPPED | OUTPATIENT
Start: 2018-03-28 | End: 2018-03-28 | Stop reason: SDUPTHER

## 2018-03-30 ENCOUNTER — APPOINTMENT (OUTPATIENT)
Dept: RADIOLOGY | Facility: MEDICAL CENTER | Age: 58
End: 2018-03-30
Payer: COMMERCIAL

## 2018-03-30 ENCOUNTER — TRANSCRIBE ORDERS (OUTPATIENT)
Dept: ADMINISTRATIVE | Facility: HOSPITAL | Age: 58
End: 2018-03-30

## 2018-03-30 DIAGNOSIS — J18.9 PNEUMONIA OF LEFT LOWER LOBE DUE TO INFECTIOUS ORGANISM: ICD-10-CM

## 2018-03-30 PROCEDURE — 71046 X-RAY EXAM CHEST 2 VIEWS: CPT

## 2018-04-02 NOTE — PROGRESS NOTES
Assessment/Plan:         Diagnoses and all orders for this visit:    Pneumonia of left lower lobe due to infectious organism ([facility] [address] )  -     XR chest pa & lateral; Future    Hypertensive urgency  -     amLODIPine (NORVASC) 10 mg tablet; Take 1 tablet (10 mg total) by mouth daily  -     lisinopril (ZESTRIL) 40 mg tablet; Take 1 tablet (40 mg total) by mouth daily  -     Discontinue: metoprolol succinate (TOPROL-XL) 100 mg 24 hr tablet; Take 1 tablet (100 mg total) by mouth daily  -     metoprolol succinate (TOPROL-XL) 100 mg 24 hr tablet; Take 1 tablet (100 mg total) by mouth daily    Mixed hyperlipidemia  -     Discontinue: atorvastatin (LIPITOR) 40 mg tablet; Take 1 tablet (40 mg total) by mouth daily  -     Discontinue: atorvastatin (LIPITOR) 40 mg tablet; Take 1 tablet (40 mg total) by mouth daily  -     atorvastatin (LIPITOR) 40 mg tablet; Take 1 tablet (40 mg total) by mouth daily    Uncontrolled type 2 diabetes with neuropathy (HCC)  -     insulin glargine (LANTUS SOLOSTAR) injection pen 100 units/mL; Inject 0 2 mL (20 Units total) under the skin daily at bedtime  -     insulin aspart (NOVOLOG FLEXPEN) 100 Units/mL SOPN; Inject 5 Units under the skin 3 (three) times a day with meals    Diabetic peripheral neuropathy (HCC)    Anxiety  -     ALPRAZolam (XANAX) 0 5 mg tablet; Take 1 tablet (0 5 mg total) by mouth 3 (three) times a day as needed for anxiety    Depression, unspecified depression type    Familial combined hyperlipidemia    Other orders  -     Discontinue: ALPRAZolam (XANAX) 0 5 mg tablet; Take 0 5 mg by mouth Three times a day  -     aspirin 81 mg chewable tablet; Chew 81 mg  -     Discontinue: OMEPRAZOLE PO; Take by mouth  -     Insulin Pen Needle (PEN NEEDLES) 32G X 5 MM MISC; Please dispense one month supply, BD ultrafine pen needles for Lantus/Humalog pen qid use  -     sertraline (ZOLOFT) 50 mg tablet;  Take 50 mg by mouth  -     Discontinue: sitaGLIPtin-metFORMIN (JANUMET)  MG per tablet; Take 1 tablet by mouth  -     Discontinue: atorvastatin (LIPITOR) 40 mg tablet; Take 40 mg by mouth  -     Discontinue: insulin glargine (LANTUS SOLOSTAR) injection pen 100 units/mL; Inject 30 Units under the skin  -     Discontinue: lisinopril (ZESTRIL) 10 mg tablet; Take 10 mg by mouth  -     Discontinue: metoprolol succinate (TOPROL-XL) 25 mg 24 hr tablet; Take 25 mg by mouth  -     Discontinue: insulin aspart (NOVOLOG FLEXPEN) 100 Units/mL SOPN; TAKE 8 UNITS WITH MEALS THREE TIMES A DAY  USE SCALE PROVIDED TO ADJUST DOSE      Discussed pt's recent hospital admission, reviewed records, and assessed her present condition today as well as discussed her chronic conditions and meds in detail  Her lung exam is benign but will send her for F/U CXR and call with results once obtained  Her BP is still significantly elevated despite her being back on her daily BP medications so will need to monitor this closely and will have her F/U in 1 month to be reassessed  I refilled al of the meds she needed today and I stressed to her that she needs to be compliant with them given the fact that she has PMH of stroke and has multiple comorbid conditions and risk for heart disease and if she cannot be compliant, then she will need to find another PCP  When she comes in for reevaluation, we will get her caught up on her diabetic and health maintenance measures including diabetic eye and foot exams  She is to RTO sooner PRN  Chief Complaint   Patient presents with    Follow-up     pneumonia ph 7 days        Subjective:      Patient ID: Zach Cline is a 65 y o  female  Pt presents for F/U from recent hospital admission to [facility] from 2/23/18-3/2/18  She was admitted for left lower lobe and left upper lobe pneumonia and was placed on IV antibiotics and discharged in stable condition and reports that overall her symptoms have resolved but she has not had F/U CXR to reassess   She stopped all of her chronic medications in November due to cost and was restarted on medications while she was in the hospital and needs refills and she says she is willing to be compliant to get her chronic conditions back under control  She says she has been compliant with taking her meds daily since discharge  The only meds she was not restarted on are her oral diabetic medications  The following portions of the patient's history were reviewed and updated as appropriate:   She  has a past medical history of Anxiety; Depression; Diabetes ([place] [address] ); Esophageal reflux; Hyperlipidemia; Hypertension; Low back pain; Stroke ([place] [address] ) (12/2015); and Vitamin D deficiency  She   Patient Active Problem List    Diagnosis Date Noted    Depression 03/02/2018    Class 1 obesity due to excess calories with serious comorbidity and body mass index (BMI) of 33 0 to 33 9 in adult 02/24/2018    Acute encephalopathy 02/24/2018    Acute respiratory failure ([facility] ) 02/24/2018    Pneumonia of both lower lobes due to infectious organism 02/23/2018    Hypertensive urgency 02/23/2018    Esophageal reflux 02/23/2018    Uncontrolled type 2 diabetes with neuropathy ([place] [address] ) 02/23/2018    Diabetic peripheral neuropathy ([place] [address] ) 05/39/3326    Systolic murmur 54/84/8286    Left-sided low back pain with sciatica 05/08/2017    Swelling of both hands 06/10/2016    Tachycardia 01/15/2016    Cerebral infarction ([place] [address] ) 12/14/2015    Anxiety 03/19/2015    Vitamin D deficiency 03/19/2015    Benign essential hypertension 08/28/2012    Familial combined hyperlipidemia 08/28/2012    Esophagitis, reflux 08/28/2012     She  has no past surgical history on file  Her family history includes Cancer in her father; Diabetes in her mother; Hypertension in her brother and family; Lung cancer in her father and mother  She  reports that she has quit smoking  She has never used smokeless tobacco  She reports that she does not drink alcohol or use drugs    Current Outpatient Prescriptions   Medication Sig Dispense Refill    ALPRAZolam (XANAX) 0 5 mg tablet Take 1 tablet (0 5 mg total) by mouth 3 (three) times a day as needed for anxiety 90 tablet 0    amLODIPine (NORVASC) 10 mg tablet Take 1 tablet (10 mg total) by mouth daily 90 tablet 1    aspirin 81 mg chewable tablet Chew 81 mg      atorvastatin (LIPITOR) 40 mg tablet Take 1 tablet (40 mg total) by mouth daily 90 tablet 0    insulin aspart (NOVOLOG FLEXPEN) 100 Units/mL SOPN Inject 5 Units under the skin 3 (three) times a day with meals 5 pen 1    insulin glargine (LANTUS SOLOSTAR) injection pen 100 units/mL Inject 0 2 mL (20 Units total) under the skin daily at bedtime 5 pen 1    Insulin Pen Needle (BD PEN NEEDLE JOHN U/F) 32G X 4 MM MISC by Does not apply route      Insulin Pen Needle (PEN NEEDLES) 32G X 5 MM MISC Please dispense one month supply, BD ultrafine pen needles for Lantus/Humalog pen qid use      lisinopril (ZESTRIL) 40 mg tablet Take 1 tablet (40 mg total) by mouth daily 90 tablet 1    metoprolol succinate (TOPROL-XL) 100 mg 24 hr tablet Take 1 tablet (100 mg total) by mouth daily 90 tablet 1    sertraline (ZOLOFT) 50 mg tablet Take 50 mg by mouth      meloxicam (MOBIC) 15 mg tablet Take 1 tablet by mouth       No current facility-administered medications for this visit  Current Outpatient Prescriptions on File Prior to Visit   Medication Sig    Insulin Pen Needle (BD PEN NEEDLE JOHN U/F) 32G X 4 MM MISC by Does not apply route    meloxicam (MOBIC) 15 mg tablet Take 1 tablet by mouth     No current facility-administered medications on file prior to visit  She has No Known Allergies       Review of Systems   Constitutional: Negative  Respiratory: Negative  Cardiovascular: Negative  Gastrointestinal: Negative  Genitourinary: Negative            Objective:      BP (!) 218/80 (BP Location: Left arm, Patient Position: Sitting, Cuff Size: Adult)   Pulse 85   Temp 98 3 °F (36 8 °C) (Tympanic)   Resp 15   Ht 5' 2 5" (1 588 m)   Wt 89 kg (196 lb 3 2 oz)   SpO2 97%   BMI 35 31 kg/m²          Physical Exam   Constitutional: She is oriented to person, place, and time  She appears well-developed and well-nourished  No distress  HENT:   Mouth/Throat: Oropharynx is clear and moist    Neck: Neck supple  No thyromegaly present  Cardiovascular: Normal rate, regular rhythm and normal heart sounds  Pulmonary/Chest: Effort normal and breath sounds normal    Musculoskeletal: She exhibits no edema  Lymphadenopathy:     She has no cervical adenopathy  Neurological: She is alert and oriented to person, place, and time  Psychiatric: She has a normal mood and affect  Vitals reviewed

## 2018-04-26 DIAGNOSIS — IMO0002 UNCONTROLLED TYPE 2 DIABETES WITH NEUROPATHY: ICD-10-CM

## 2018-06-26 DIAGNOSIS — E78.2 MIXED HYPERLIPIDEMIA: ICD-10-CM

## 2018-06-26 RX ORDER — ATORVASTATIN CALCIUM 40 MG/1
40 TABLET, FILM COATED ORAL DAILY
Qty: 90 TABLET | Refills: 0 | Status: SHIPPED | OUTPATIENT
Start: 2018-06-26 | End: 2018-11-08 | Stop reason: SDUPTHER

## 2018-07-02 DIAGNOSIS — F41.9 ANXIETY: ICD-10-CM

## 2018-07-02 RX ORDER — SERTRALINE HYDROCHLORIDE 100 MG/1
TABLET, FILM COATED ORAL
Qty: 45 TABLET | Refills: 0 | Status: SHIPPED | OUTPATIENT
Start: 2018-07-02 | End: 2018-10-20 | Stop reason: SDUPTHER

## 2018-07-02 RX ORDER — ALPRAZOLAM 0.5 MG/1
TABLET ORAL
Qty: 90 TABLET | Refills: 0 | Status: SHIPPED | OUTPATIENT
Start: 2018-07-02 | End: 2018-10-03 | Stop reason: SDUPTHER

## 2018-09-07 ENCOUNTER — OFFICE VISIT (OUTPATIENT)
Dept: FAMILY MEDICINE CLINIC | Facility: CLINIC | Age: 58
End: 2018-09-07
Payer: COMMERCIAL

## 2018-09-07 VITALS
DIASTOLIC BLOOD PRESSURE: 70 MMHG | HEART RATE: 77 BPM | OXYGEN SATURATION: 98 % | HEIGHT: 63 IN | RESPIRATION RATE: 18 BRPM | WEIGHT: 190.4 LBS | TEMPERATURE: 98.3 F | BODY MASS INDEX: 33.73 KG/M2 | SYSTOLIC BLOOD PRESSURE: 148 MMHG

## 2018-09-07 DIAGNOSIS — E78.49 FAMILIAL COMBINED HYPERLIPIDEMIA: ICD-10-CM

## 2018-09-07 DIAGNOSIS — IMO0002 UNCONTROLLED TYPE 2 DIABETES WITH NEUROPATHY: ICD-10-CM

## 2018-09-07 DIAGNOSIS — I10 BENIGN ESSENTIAL HYPERTENSION: ICD-10-CM

## 2018-09-07 DIAGNOSIS — F41.9 ANXIETY: ICD-10-CM

## 2018-09-07 DIAGNOSIS — M25.551 CHRONIC RIGHT HIP PAIN: Primary | ICD-10-CM

## 2018-09-07 DIAGNOSIS — E55.9 VITAMIN D DEFICIENCY: ICD-10-CM

## 2018-09-07 DIAGNOSIS — G89.29 CHRONIC RIGHT HIP PAIN: Primary | ICD-10-CM

## 2018-09-07 DIAGNOSIS — F32.A DEPRESSION, UNSPECIFIED DEPRESSION TYPE: ICD-10-CM

## 2018-09-07 LAB — SL AMB POCT HEMOGLOBIN AIC: 8.8

## 2018-09-07 PROCEDURE — 3045F PR MOST RECENT HEMOGLOBIN A1C LEVEL 7.0-9.0%: CPT | Performed by: PHYSICIAN ASSISTANT

## 2018-09-07 PROCEDURE — 83036 HEMOGLOBIN GLYCOSYLATED A1C: CPT | Performed by: PHYSICIAN ASSISTANT

## 2018-09-07 PROCEDURE — 99214 OFFICE O/P EST MOD 30 MIN: CPT | Performed by: PHYSICIAN ASSISTANT

## 2018-09-07 RX ORDER — TRAMADOL HYDROCHLORIDE 50 MG/1
50 TABLET ORAL EVERY 6 HOURS PRN
Qty: 30 TABLET | Refills: 0 | Status: SHIPPED | OUTPATIENT
Start: 2018-09-07 | End: 2018-09-18 | Stop reason: SDUPTHER

## 2018-09-07 RX ORDER — MELOXICAM 15 MG/1
15 TABLET ORAL DAILY
Qty: 30 TABLET | Refills: 0 | Status: SHIPPED | OUTPATIENT
Start: 2018-09-07 | End: 2018-10-02 | Stop reason: SDUPTHER

## 2018-09-07 NOTE — PROGRESS NOTES
Assessment/Plan:         Diagnoses and all orders for this visit:    Chronic right hip pain  -     XR hip/pelv 2-3 vws right if performed; Future  -     meloxicam (MOBIC) 15 mg tablet; Take 1 tablet (15 mg total) by mouth daily  -     traMADol (ULTRAM) 50 mg tablet; Take 1 tablet (50 mg total) by mouth every 6 (six) hours as needed for moderate pain    Benign essential hypertension  -     CBC and differential; Future  -     Comprehensive metabolic panel; Future  -     Lipid panel; Future  -     TSH, 3rd generation with Free T4 reflex; Future  -     Vitamin D 25 hydroxy; Future  -     Microalbumin / creatinine urine ratio; Future    Uncontrolled type 2 diabetes with neuropathy (HCC)  -     insulin glargine (LANTUS SOLOSTAR) 100 units/mL injection pen; Inject 30 Units under the skin daily at bedtime  -     POCT hemoglobin A1c  -     CBC and differential; Future  -     Comprehensive metabolic panel; Future  -     Lipid panel; Future  -     TSH, 3rd generation with Free T4 reflex; Future  -     Vitamin D 25 hydroxy; Future  -     Microalbumin / creatinine urine ratio; Future    Depression, unspecified depression type    Anxiety    Familial combined hyperlipidemia  -     CBC and differential; Future  -     Comprehensive metabolic panel; Future  -     Lipid panel; Future  -     TSH, 3rd generation with Free T4 reflex; Future  -     Vitamin D 25 hydroxy; Future  -     Microalbumin / creatinine urine ratio; Future    Vitamin D deficiency  -     CBC and differential; Future  -     Comprehensive metabolic panel; Future  -     Lipid panel; Future  -     TSH, 3rd generation with Free T4 reflex; Future  -     Vitamin D 25 hydroxy; Future  -     Microalbumin / creatinine urine ratio; Future       Discussed patient's chronic right hip pain as well as her chronic conditions, medications, and assess her present condition today  I will further evaluate her hip with an x-ray and call with results once obtained    In the interim, I will provide her a combination of meloxicam and tramadol to reduce the inflammation and help control the pain and I recommended intermittent ice and gentle stretching as well as limited weight-bearing and avoiding any strenuous activity for now  Regarding her chronic conditions, it appears that she is being compliant on her medications but I am concerned because her BP is still quite elevated despite being maxed out on 3 antihypertensive medications  I recommended that she start checking her pressures at home and recorded diary over the next month and if she is persistently uncontrolled, then we may need to discuss further eval to rule out a secondary cause of hypertension  Her POC A1c in the office is 8 8 which is down from 9 6 on 03/01/2018  I refilled her Lantus but her fasting sugars are not overall controlled so I recommended that she start increasing from 30 units daily which she is taking currently by 1 unit per day until her fasting blood sugar is between 101 20 for 3 consecutive nights then she is to call the office with that steady maintenance daily dose  She is to continue her NovoLog at 5 units 3 times daily with meals  Her A1c will be rechecked in 4 months and if we control her fasting sugars and A1c still not at goal, then we will need to increase the NovoLog upward  The rest of her conditions are stable and controlled at this time all medications  We will send her for fasting blood work and call with results once obtained and she is to follow up in the next few weeks to review these lab results and determine if any other medication adjustments are necessary  At that time, we will check POC diabetic eye and foot exams  Chief Complaint   Patient presents with    Same day appt     check up/ Right hip pain for weeks  when active pt has pain and when lying on (advil and tylenol every two hours)         Subjective:      Patient ID: Zach Cline is a 65 y o  female      Pt presents with several week history of pain that starts in her right hip, worse when laying on right side and with prolonged weightbearing  Denies known injury/trauma  The pain does not radiate into her back but it shoots down her right leg and she has distal RLE edema  The pain is aching in nature and is accompanied by numbness in her right foot  She has taken Advil and Tylenol (4 of each today)  She reports she has been doing well as far as compliance with her chronic medical conditions and medications  She needs refill of her Lantus today  She is also due for fasting blood work and urine for microalbumin  The following portions of the patient's history were reviewed and updated as appropriate:   She  has a past medical history of Anxiety; Depression; Diabetes ([facility] ); Esophageal reflux; Hyperlipidemia; Hypertension; Low back pain; Stroke ([facility] ) (12/2015); and Vitamin D deficiency  She   Patient Active Problem List    Diagnosis Date Noted    Depression 03/02/2018    Class 1 obesity due to excess calories with serious comorbidity and body mass index (BMI) of 33 0 to 33 9 in adult 02/24/2018    Acute encephalopathy 02/24/2018    Acute respiratory failure ([facility] [address] ) 02/24/2018    Pneum[facility] both lower lobes due to [facility] organism [facility] ) 02/23/2018    Hypertensive urgency 02/23/2018    Esophageal reflux 02/23/2018    Uncontrolled type 2 diabetes with neuropathy ([facility] [address] ) 02/23/2018    Diabetic peripheral neuropathy ([facility] ) 55/08/3405    Systolic murmur 19/33/6680    Left-sided low back pain with sciatica 05/08/2017    Swelling of both hands 06/10/2016    Tachycardia 01/15/2016    Cerebral infarction ([facility] ) 12/14/2015    Anxiety 03/19/2015    Vitamin D deficiency 03/19/2015    Benign essential hypertension 08/28/2012    Familial combined hyperlipidemia 08/28/2012    Esophagitis, reflux 08/28/2012     She  has no past surgical history on file    Her family history includes Cancer in her father; Diabetes in her mother; Hypertension in her brother and family; Lung cancer in her father and mother  She  reports that she has quit smoking  She has never used smokeless tobacco  She reports that she does not drink alcohol or use drugs  Current Outpatient Prescriptions   Medication Sig Dispense Refill    ALPRAZolam (XANAX) 0 5 mg tablet TAKE ONE TABLET BY MOUTH THREE TIMES A DAY AS NEEDED FOR ANXIETY 90 tablet 0    amLODIPine (NORVASC) 10 mg tablet Take 1 tablet (10 mg total) by mouth daily 90 tablet 1    aspirin 81 mg chewable tablet Chew 81 mg      atorvastatin (LIPITOR) 40 mg tablet TAKE 1 TABLET (40 MG TOTAL) BY MOUTH DAILY 90 tablet 0    insulin aspart (NOVOLOG FLEXPEN) 100 Units/mL SOPN Inject 5 Units under the skin 3 (three) times a day with meals 5 pen 1    insulin glargine (LANTUS SOLOSTAR) 100 units/mL injection pen Inject 30 Units under the skin daily at bedtime 5 pen 3    Insulin Pen Needle (BD PEN NEEDLE JOHN U/F) 32G X 4 MM MISC Use 4 times a day to administer insulin 100 each 1    Insulin Pen Needle (PEN NEEDLES) 32G X 5 MM MISC Please dispense one month supply, BD ultrafine pen needles for Lantus/Humalog pen qid use      lisinopril (ZESTRIL) 40 mg tablet Take 1 tablet (40 mg total) by mouth daily 90 tablet 1    metoprolol succinate (TOPROL-XL) 100 mg 24 hr tablet Take 1 tablet (100 mg total) by mouth daily 90 tablet 1    sertraline (ZOLOFT) 100 mg tablet TAKE 1 AND 1/2 TABLET BY MOUTH DAILY 45 tablet 0    meloxicam (MOBIC) 15 mg tablet Take 1 tablet (15 mg total) by mouth daily 30 tablet 0    traMADol (ULTRAM) 50 mg tablet Take 1 tablet (50 mg total) by mouth every 6 (six) hours as needed for moderate pain 30 tablet 0     No current facility-administered medications for this visit        Current Outpatient Prescriptions on File Prior to Visit   Medication Sig    ALPRAZolam (XANAX) 0 5 mg tablet TAKE ONE TABLET BY MOUTH THREE TIMES A DAY AS NEEDED FOR ANXIETY    amLODIPine (NORVASC) 10 mg tablet Take 1 tablet (10 mg total) by mouth daily    aspirin 81 mg chewable tablet Chew 81 mg    atorvastatin (LIPITOR) 40 mg tablet TAKE 1 TABLET (40 MG TOTAL) BY MOUTH DAILY    insulin aspart (NOVOLOG FLEXPEN) 100 Units/mL SOPN Inject 5 Units under the skin 3 (three) times a day with meals    Insulin Pen Needle (BD PEN NEEDLE JOHN U/F) 32G X 4 MM MISC Use 4 times a day to administer insulin    Insulin Pen Needle (PEN NEEDLES) 32G X 5 MM MISC Please dispense one month supply, BD ultrafine pen needles for Lantus/Humalog pen qid use    lisinopril (ZESTRIL) 40 mg tablet Take 1 tablet (40 mg total) by mouth daily    metoprolol succinate (TOPROL-XL) 100 mg 24 hr tablet Take 1 tablet (100 mg total) by mouth daily    sertraline (ZOLOFT) 100 mg tablet TAKE 1 AND 1/2 TABLET BY MOUTH DAILY     No current facility-administered medications on file prior to visit  She has No Known Allergies       Review of Systems   Constitutional: Negative  Respiratory: Negative  Cardiovascular: Positive for leg swelling (Right)  Gastrointestinal: Negative  Genitourinary: Negative  Musculoskeletal:        Right hip and leg pain   Neurological: Positive for numbness (Right foot)  Objective:      /70   Pulse 77   Temp 98 3 °F (36 8 °C) (Tympanic)   Resp 18   Ht 5' 2 99" (1 6 m)   Wt 86 4 kg (190 lb 6 4 oz)   SpO2 98%   BMI 33 74 kg/m²          Physical Exam   Constitutional: She is oriented to person, place, and time  She appears well-developed and well-nourished  No distress  HENT:   Mouth/Throat: Oropharynx is clear and moist    Neck: Neck supple  No thyromegaly present  Cardiovascular: Normal rate, regular rhythm and normal heart sounds  No carotid bruits   Pulmonary/Chest: Effort normal and breath sounds normal    Musculoskeletal: She exhibits edema (  Right lower extremity pretibial region with trace pitting edema  )     Tenderness to palpation in the right hip joint worsened with passive range of motion including flexion, external and internal rotation but there is no crepitus or sign of instability noted  Lymphadenopathy:     She has no cervical adenopathy  Neurological: She is alert and oriented to person, place, and time  Psychiatric: She has a normal mood and affect  Vitals reviewed

## 2018-09-18 DIAGNOSIS — G89.29 CHRONIC RIGHT HIP PAIN: ICD-10-CM

## 2018-09-18 DIAGNOSIS — M25.551 CHRONIC RIGHT HIP PAIN: ICD-10-CM

## 2018-09-19 RX ORDER — TRAMADOL HYDROCHLORIDE 50 MG/1
50 TABLET ORAL EVERY 6 HOURS PRN
Qty: 30 TABLET | Refills: 0 | Status: SHIPPED | OUTPATIENT
Start: 2018-09-19 | End: 2019-10-01 | Stop reason: ALTCHOICE

## 2018-09-28 ENCOUNTER — APPOINTMENT (OUTPATIENT)
Dept: LAB | Facility: MEDICAL CENTER | Age: 58
End: 2018-09-28
Payer: COMMERCIAL

## 2018-09-28 ENCOUNTER — APPOINTMENT (OUTPATIENT)
Dept: RADIOLOGY | Facility: MEDICAL CENTER | Age: 58
End: 2018-09-28
Payer: COMMERCIAL

## 2018-09-28 DIAGNOSIS — M25.551 CHRONIC RIGHT HIP PAIN: ICD-10-CM

## 2018-09-28 DIAGNOSIS — E55.9 VITAMIN D DEFICIENCY: ICD-10-CM

## 2018-09-28 DIAGNOSIS — G89.29 CHRONIC RIGHT HIP PAIN: ICD-10-CM

## 2018-09-28 DIAGNOSIS — IMO0002 UNCONTROLLED TYPE 2 DIABETES WITH NEUROPATHY: ICD-10-CM

## 2018-09-28 DIAGNOSIS — E78.49 FAMILIAL COMBINED HYPERLIPIDEMIA: ICD-10-CM

## 2018-09-28 DIAGNOSIS — I10 BENIGN ESSENTIAL HYPERTENSION: ICD-10-CM

## 2018-09-28 LAB
25(OH)D3 SERPL-MCNC: 18.2 NG/ML (ref 30–100)
ALBUMIN SERPL BCP-MCNC: 3 G/DL (ref 3.5–5)
ALP SERPL-CCNC: 207 U/L (ref 46–116)
ALT SERPL W P-5'-P-CCNC: 24 U/L (ref 12–78)
ANION GAP SERPL CALCULATED.3IONS-SCNC: 5 MMOL/L (ref 4–13)
AST SERPL W P-5'-P-CCNC: 10 U/L (ref 5–45)
BASOPHILS # BLD AUTO: 0.06 THOUSANDS/ΜL (ref 0–0.1)
BASOPHILS NFR BLD AUTO: 1 % (ref 0–1)
BILIRUB SERPL-MCNC: 0.32 MG/DL (ref 0.2–1)
BUN SERPL-MCNC: 7 MG/DL (ref 5–25)
CALCIUM SERPL-MCNC: 9 MG/DL (ref 8.3–10.1)
CHLORIDE SERPL-SCNC: 101 MMOL/L (ref 100–108)
CHOLEST SERPL-MCNC: 133 MG/DL (ref 50–200)
CO2 SERPL-SCNC: 30 MMOL/L (ref 21–32)
CREAT SERPL-MCNC: 0.69 MG/DL (ref 0.6–1.3)
EOSINOPHIL # BLD AUTO: 0.59 THOUSAND/ΜL (ref 0–0.61)
EOSINOPHIL NFR BLD AUTO: 6 % (ref 0–6)
ERYTHROCYTE [DISTWIDTH] IN BLOOD BY AUTOMATED COUNT: 13.7 % (ref 11.6–15.1)
GFR SERPL CREATININE-BSD FRML MDRD: 98 ML/MIN/1.73SQ M
GLUCOSE P FAST SERPL-MCNC: 125 MG/DL (ref 65–99)
HCT VFR BLD AUTO: 40.4 % (ref 34.8–46.1)
HDLC SERPL-MCNC: 37 MG/DL (ref 40–60)
HGB BLD-MCNC: 12.5 G/DL (ref 11.5–15.4)
IMM GRANULOCYTES # BLD AUTO: 0.04 THOUSAND/UL (ref 0–0.2)
IMM GRANULOCYTES NFR BLD AUTO: 0 % (ref 0–2)
LDLC SERPL CALC-MCNC: 63 MG/DL (ref 0–100)
LYMPHOCYTES # BLD AUTO: 1.86 THOUSANDS/ΜL (ref 0.6–4.47)
LYMPHOCYTES NFR BLD AUTO: 18 % (ref 14–44)
MCH RBC QN AUTO: 25.4 PG (ref 26.8–34.3)
MCHC RBC AUTO-ENTMCNC: 30.9 G/DL (ref 31.4–37.4)
MCV RBC AUTO: 82 FL (ref 82–98)
MONOCYTES # BLD AUTO: 0.85 THOUSAND/ΜL (ref 0.17–1.22)
MONOCYTES NFR BLD AUTO: 8 % (ref 4–12)
NEUTROPHILS # BLD AUTO: 6.7 THOUSANDS/ΜL (ref 1.85–7.62)
NEUTS SEG NFR BLD AUTO: 67 % (ref 43–75)
NONHDLC SERPL-MCNC: 96 MG/DL
NRBC BLD AUTO-RTO: 0 /100 WBCS
PLATELET # BLD AUTO: 408 THOUSANDS/UL (ref 149–390)
PMV BLD AUTO: 8.8 FL (ref 8.9–12.7)
POTASSIUM SERPL-SCNC: 4 MMOL/L (ref 3.5–5.3)
PROT SERPL-MCNC: 8 G/DL (ref 6.4–8.2)
RBC # BLD AUTO: 4.92 MILLION/UL (ref 3.81–5.12)
SODIUM SERPL-SCNC: 136 MMOL/L (ref 136–145)
TRIGL SERPL-MCNC: 167 MG/DL
TSH SERPL DL<=0.05 MIU/L-ACNC: 3.36 UIU/ML (ref 0.36–3.74)
WBC # BLD AUTO: 10.1 THOUSAND/UL (ref 4.31–10.16)

## 2018-09-28 PROCEDURE — 84443 ASSAY THYROID STIM HORMONE: CPT

## 2018-09-28 PROCEDURE — 73502 X-RAY EXAM HIP UNI 2-3 VIEWS: CPT

## 2018-09-28 PROCEDURE — 36415 COLL VENOUS BLD VENIPUNCTURE: CPT

## 2018-09-28 PROCEDURE — 85025 COMPLETE CBC W/AUTO DIFF WBC: CPT

## 2018-09-28 PROCEDURE — 82306 VITAMIN D 25 HYDROXY: CPT

## 2018-09-28 PROCEDURE — 80053 COMPREHEN METABOLIC PANEL: CPT

## 2018-09-28 PROCEDURE — 80061 LIPID PANEL: CPT

## 2018-10-02 ENCOUNTER — OFFICE VISIT (OUTPATIENT)
Dept: FAMILY MEDICINE CLINIC | Facility: CLINIC | Age: 58
End: 2018-10-02
Payer: COMMERCIAL

## 2018-10-02 VITALS
HEART RATE: 83 BPM | OXYGEN SATURATION: 98 % | TEMPERATURE: 98.3 F | RESPIRATION RATE: 16 BRPM | SYSTOLIC BLOOD PRESSURE: 128 MMHG | WEIGHT: 189.3 LBS | BODY MASS INDEX: 34.83 KG/M2 | HEIGHT: 62 IN | DIASTOLIC BLOOD PRESSURE: 70 MMHG

## 2018-10-02 DIAGNOSIS — F32.A DEPRESSION, UNSPECIFIED DEPRESSION TYPE: ICD-10-CM

## 2018-10-02 DIAGNOSIS — I10 BENIGN ESSENTIAL HYPERTENSION: ICD-10-CM

## 2018-10-02 DIAGNOSIS — E55.9 VITAMIN D DEFICIENCY: ICD-10-CM

## 2018-10-02 DIAGNOSIS — E78.49 FAMILIAL COMBINED HYPERLIPIDEMIA: ICD-10-CM

## 2018-10-02 DIAGNOSIS — F41.9 ANXIETY: ICD-10-CM

## 2018-10-02 DIAGNOSIS — G89.29 CHRONIC RIGHT HIP PAIN: ICD-10-CM

## 2018-10-02 DIAGNOSIS — IMO0002 UNCONTROLLED TYPE 2 DIABETES WITH NEUROPATHY: Primary | ICD-10-CM

## 2018-10-02 DIAGNOSIS — M25.551 CHRONIC RIGHT HIP PAIN: ICD-10-CM

## 2018-10-02 PROBLEM — I16.0 HYPERTENSIVE URGENCY: Status: RESOLVED | Noted: 2018-02-23 | Resolved: 2018-10-02

## 2018-10-02 LAB
LEFT EYE DIABETIC RETINOPATHY: ABNORMAL
LEFT EYE IMAGE QUALITY: ABNORMAL
RIGHT EYE DIABETIC RETINOPATHY: ABNORMAL
RIGHT EYE IMAGE QUALITY: ABNORMAL
SEVERITY (EYE EXAM): ABNORMAL

## 2018-10-02 PROCEDURE — 99214 OFFICE O/P EST MOD 30 MIN: CPT | Performed by: PHYSICIAN ASSISTANT

## 2018-10-02 PROCEDURE — 3078F DIAST BP <80 MM HG: CPT | Performed by: PHYSICIAN ASSISTANT

## 2018-10-02 PROCEDURE — 3072F LOW RISK FOR RETINOPATHY: CPT | Performed by: PHYSICIAN ASSISTANT

## 2018-10-02 PROCEDURE — 3074F SYST BP LT 130 MM HG: CPT | Performed by: PHYSICIAN ASSISTANT

## 2018-10-02 RX ORDER — MELOXICAM 15 MG/1
15 TABLET ORAL DAILY
Qty: 30 TABLET | Refills: 0 | Status: SHIPPED | OUTPATIENT
Start: 2018-10-02 | End: 2018-11-02 | Stop reason: SDUPTHER

## 2018-10-02 NOTE — PROGRESS NOTES
Assessment/Plan:         Diagnoses and all orders for this visit:    Uncontrolled type 2 diabetes with neuropathy ([address] )  -     Comprehensive metabolic panel; Future  -     Lipid panel; Future  -     Hemoglobin A1C; Future  -     Vitamin D 25 hydroxy; Future    Benign essential hypertension  -     Comprehensive metabolic panel; Future  -     Lipid panel; Future  -     Hemoglobin A1C; Future  -     Vitamin D 25 hydroxy; Future    Familial combined hyperlipidemia  -     Comprehensive metabolic panel; Future  -     Lipid panel; Future  -     Hemoglobin A1C; Future  -     Vitamin D 25 hydroxy; Future    Vitamin D deficiency  -     Cholecalciferol (VITAMIN D-3) 5000 units TABS; Take 1 tablet by mouth daily  -     Comprehensive metabolic panel; Future  -     Lipid panel; Future  -     Hemoglobin A1C; Future  -     Vitamin D 25 hydroxy; Future    Depression, unspecified depression type    Anxiety    Chronic right hip pain  -     meloxicam (MOBIC) 15 mg tablet; Take 1 tablet (15 mg total) by mouth daily  -     Ambulatory referral to Orthopedic Surgery; Future      Discussed chronic conditions, meds, recent BW results, and discussed further her chronic right hip pain and recent X-ray results  Her FBS was 125 and her A1C in office at last visit was 8 8  I instructed her that until her FBS is consistently between 100 and 120, that she needs to increase the Lantus by one unit nightly  We will continue current dose of her Novolog at 5 units TID with meals but if next A1C not at goal, then will need to increase this  Lipids show chol 133, LDL 63 which are excellent  HDL low at 37 and trigs high at 167  Needs to watch her diet and exercise as tolerated  TSH WNL  Vit D low at 18 2  I rec 5000 IU/day OTC of Vit D and recheck with next FBW  She will have retinopathy screening in the office today and will be called with results  Her hip X-ray is normal  I will prescribe her Meloxicam and refer her to ortho for consult   She is to RTO in 4 months with repeat FBW pre-appt, sooner PRN  She reports she dropped her urine off at the lab as she was not able to give sample the day she went for BW and that they are supposed to be running a urine for MA  Chief Complaint   Patient presents with    Follow-up     Patient presents follow up on blood work and xray  Subjective:      Patient ID: Naz Shahid is a 63 y o  female  Pt presents for one month F/U to discuss her chronic right hip pain as well as her chronic conditions including in depth review of recent BW results  She went for hip X-ray but reports no significant improvement in the symptoms since last visit  She is taking all of her regularly prescribed medications daily/compliantly without issues  Does not need refills today  She is currently taking 34 units QHS of her Lantus  The following portions of the patient's history were reviewed and updated as appropriate: She  has a past medical history of Anxiety; Depression; Diabetes ([facility] [address] ); Esophageal reflux; Hyperlipidemia; Hypertension; Low back pain; Stroke ([facility] ) (12/2015); and Vitamin D deficiency    She   Patient Active Problem List    Diagnosis Date Noted    Depression 03/02/2018    Class 1 obesity due to excess calories with serious comorbidity and body mass index (BMI) of 33 0 to 33 9 in adult 02/24/2018    Acute encephalopathy 02/24/2018    Acute respiratory failure ([facility] [address] ) 02/24/2018    Pneumonia of both lower lobes due to infectious organism ([facility] [address] ) 02/23/2018    Esophageal reflux 02/23/2018    Uncontrolled type 2 diabetes with neuropathy ([facility] [address] ) 02/23/2018    Diabetic peripheral neuropathy ([facility] [address] ) 16/87/3701    Systolic murmur 66/10/7582    Left-sided low back pain with sciatica 05/08/2017    Swelling of both hands 06/10/2016    Tachycardia 01/15/2016    Cerebral infarction ([facility] [address] ) 12/14/2015    Anxiety 03/19/2015    Vitamin D deficiency 03/19/2015    Benign essential hypertension 08/28/2012    Familial combined hyperlipidemia 08/28/2012    Esophagitis, reflux 08/28/2012     She  has no past surgical history on file  Her family history includes Cancer in her father; Diabetes in her mother; Hypertension in her brother and family; Lung cancer in her father and mother  She  reports that she has quit smoking  She has never used smokeless tobacco  She reports that she does not drink alcohol or use drugs    Current Outpatient Prescriptions   Medication Sig Dispense Refill    amLODIPine (NORVASC) 10 mg tablet Take 1 tablet (10 mg total) by mouth daily 90 tablet 1    aspirin 81 mg chewable tablet Chew 81 mg      atorvastatin (LIPITOR) 40 mg tablet TAKE 1 TABLET (40 MG TOTAL) BY MOUTH DAILY 90 tablet 0    insulin aspart (NOVOLOG FLEXPEN) 100 Units/mL SOPN Inject 5 Units under the skin 3 (three) times a day with meals 5 pen 1    insulin glargine (LANTUS SOLOSTAR) 100 units/mL injection pen Inject 30 Units under the skin daily at bedtime 5 pen 3    Insulin Pen Needle (BD PEN NEEDLE JOHN U/F) 32G X 4 MM MISC Use 4 times a day to administer insulin 100 each 1    Insulin Pen Needle (PEN NEEDLES) 32G X 5 MM MISC Please dispense one month supply, BD ultrafine pen needles for Lantus/Humalog pen qid use      meloxicam (MOBIC) 15 mg tablet Take 1 tablet (15 mg total) by mouth daily 30 tablet 0    sertraline (ZOLOFT) 100 mg tablet TAKE 1 AND 1/2 TABLET BY MOUTH DAILY 45 tablet 0    traMADol (ULTRAM) 50 mg tablet TAKE 1 TABLET (50 MG TOTAL) BY MOUTH EVERY 6 (SIX) HOURS AS NEEDED FOR MODERATE PAIN 30 tablet 0    ALPRAZolam (XANAX) 0 5 mg tablet TAKE ONE TABLET BY MOUTH THREE TIMES A DAY AS NEEDED FOR ANXIETY 90 tablet 0    Cholecalciferol (VITAMIN D-3) 5000 units TABS Take 1 tablet by mouth daily 30 tablet 5    lisinopril (ZESTRIL) 40 mg tablet TAKE 1 TABLET (40 MG TOTAL) BY MOUTH DAILY 90 tablet 1    metoprolol succinate (TOPROL-XL) 100 mg 24 hr tablet TAKE 1 TABLET (100 MG TOTAL) BY MOUTH DAILY 90 tablet 1     No current facility-administered medications for this visit  Current Outpatient Prescriptions on File Prior to Visit   Medication Sig    amLODIPine (NORVASC) 10 mg tablet Take 1 tablet (10 mg total) by mouth daily    aspirin 81 mg chewable tablet Chew 81 mg    atorvastatin (LIPITOR) 40 mg tablet TAKE 1 TABLET (40 MG TOTAL) BY MOUTH DAILY    insulin aspart (NOVOLOG FLEXPEN) 100 Units/mL SOPN Inject 5 Units under the skin 3 (three) times a day with meals    insulin glargine (LANTUS SOLOSTAR) 100 units/mL injection pen Inject 30 Units under the skin daily at bedtime    Insulin Pen Needle (BD PEN NEEDLE JOHN U/F) 32G X 4 MM MISC Use 4 times a day to administer insulin    Insulin Pen Needle (PEN NEEDLES) 32G X 5 MM MISC Please dispense one month supply, BD ultrafine pen needles for Lantus/Humalog pen qid use    sertraline (ZOLOFT) 100 mg tablet TAKE 1 AND 1/2 TABLET BY MOUTH DAILY    traMADol (ULTRAM) 50 mg tablet TAKE 1 TABLET (50 MG TOTAL) BY MOUTH EVERY 6 (SIX) HOURS AS NEEDED FOR MODERATE PAIN     No current facility-administered medications on file prior to visit  She has No Known Allergies       Review of Systems   Constitutional: Negative  Respiratory: Negative  Cardiovascular: Negative  Gastrointestinal: Negative  Genitourinary: Negative  Musculoskeletal:        Chronic right hip pain         Objective:      /70 (BP Location: Right arm, Patient Position: Sitting, Cuff Size: Standard)   Pulse 83   Temp 98 3 °F (36 8 °C) (Tympanic)   Resp 16   Ht 5' 2" (1 575 m)   Wt 85 9 kg (189 lb 4 8 oz)   SpO2 98%   BMI 34 62 kg/m²          Physical Exam   Constitutional: She is oriented to person, place, and time  She appears well-developed and well-nourished  No distress  HENT:   Mouth/Throat: Oropharynx is clear and moist    Neck: Neck supple  No thyromegaly present  Cardiovascular: Normal rate, regular rhythm and normal heart sounds      No carotid bruits   Pulmonary/Chest: Effort normal and breath sounds normal    Musculoskeletal: She exhibits no edema  Right hip exam largely unchanged from previous visit  Still has TTP in joint worsened with PROM in all planes  No crepitus or instability noted  Lymphadenopathy:     She has no cervical adenopathy  Neurological: She is alert and oriented to person, place, and time  Psychiatric: She has a normal mood and affect  Vitals reviewed

## 2018-10-03 DIAGNOSIS — F41.9 ANXIETY: ICD-10-CM

## 2018-10-04 DIAGNOSIS — I16.0 HYPERTENSIVE URGENCY: ICD-10-CM

## 2018-10-08 PROCEDURE — 4010F ACE/ARB THERAPY RXD/TAKEN: CPT | Performed by: PHYSICIAN ASSISTANT

## 2018-10-08 RX ORDER — LISINOPRIL 40 MG/1
40 TABLET ORAL DAILY
Qty: 90 TABLET | Refills: 1 | Status: SHIPPED | OUTPATIENT
Start: 2018-10-08 | End: 2019-06-11 | Stop reason: SDUPTHER

## 2018-10-08 RX ORDER — ALPRAZOLAM 0.5 MG/1
TABLET ORAL
Qty: 90 TABLET | Refills: 0 | Status: SHIPPED | OUTPATIENT
Start: 2018-10-08 | End: 2019-01-27 | Stop reason: SDUPTHER

## 2018-10-08 RX ORDER — METOPROLOL SUCCINATE 100 MG/1
100 TABLET, EXTENDED RELEASE ORAL DAILY
Qty: 90 TABLET | Refills: 1 | Status: SHIPPED | OUTPATIENT
Start: 2018-10-08 | End: 2019-06-11 | Stop reason: SDUPTHER

## 2018-10-16 DIAGNOSIS — E11.319 DIABETIC RETINOPATHY OF BOTH EYES ASSOCIATED WITH TYPE 2 DIABETES MELLITUS, MACULAR EDEMA PRESENCE UNSPECIFIED, UNSPECIFIED RETINOPATHY SEVERITY (HCC): Primary | ICD-10-CM

## 2018-10-19 ENCOUNTER — OFFICE VISIT (OUTPATIENT)
Dept: FAMILY MEDICINE CLINIC | Facility: CLINIC | Age: 58
End: 2018-10-19
Payer: COMMERCIAL

## 2018-10-19 VITALS
DIASTOLIC BLOOD PRESSURE: 78 MMHG | RESPIRATION RATE: 16 BRPM | BODY MASS INDEX: 34.39 KG/M2 | WEIGHT: 186.9 LBS | TEMPERATURE: 98.7 F | HEART RATE: 80 BPM | OXYGEN SATURATION: 98 % | HEIGHT: 62 IN | SYSTOLIC BLOOD PRESSURE: 126 MMHG

## 2018-10-19 DIAGNOSIS — G89.29 CHRONIC RIGHT HIP PAIN: Primary | ICD-10-CM

## 2018-10-19 DIAGNOSIS — Z23 NEED FOR PROPHYLACTIC VACCINATION AND INOCULATION AGAINST INFLUENZA: ICD-10-CM

## 2018-10-19 DIAGNOSIS — M25.551 CHRONIC RIGHT HIP PAIN: Primary | ICD-10-CM

## 2018-10-19 PROCEDURE — 99213 OFFICE O/P EST LOW 20 MIN: CPT | Performed by: PHYSICIAN ASSISTANT

## 2018-10-19 PROCEDURE — 90682 RIV4 VACC RECOMBINANT DNA IM: CPT | Performed by: PHYSICIAN ASSISTANT

## 2018-10-19 PROCEDURE — 90471 IMMUNIZATION ADMIN: CPT | Performed by: PHYSICIAN ASSISTANT

## 2018-10-19 NOTE — PROGRESS NOTES
Assessment/Plan:         Diagnoses and all orders for this visit:    Chronic right hip pain    Need for prophylactic vaccination and inoculation against influenza  -     influenza vaccine, [id number], quadrivalent, recombinant, PF, 0 5 mL, for patients 18 yr+ (FLUBLOK)      Discussed pt's condition with her  Her chronic right hip pain has improved with conservative treatment  If her issues persist, she has the option to see ortho  I filled out her FMLA paperwork to cover her leave and to also give her intermittent FMLA should the pain flare up  She will be given the flu shot today  Chief Complaint   Patient presents with    FMLA paperwork         Subjective:      Patient ID: Tori Giles is a 63 y o  female  Pt presents for F/U of chronic right hip pain  Since her last visit, her hip feels better overall  Does have some occ pain at night when she lays on that side  Taking Meloxicam PRN  She is no longer taking the Tramadol  She has been up and walking and performing exercises intermittently which have been helpful  She has not yet gone to see ortho and thinks she is going to hold off on that for now  She would like to be cleared to RTW as of 10/22/18  She needs FMLA to cover period of 9/26/18-10/21/18  She also would like the flu shot today  The following portions of the patient's history were reviewed and updated as appropriate: allergies, current medications, past family history, past medical history, past social history, past surgical history and problem list     Review of Systems   Constitutional: Negative  Respiratory: Negative  Cardiovascular: Negative  Gastrointestinal: Negative  Genitourinary: Negative  Musculoskeletal:        Chronic right hip pain, improved    Neurological: Negative            Objective:      /78 (BP Location: Left arm, Patient Position: Sitting, Cuff Size: Adult)   Pulse 80   Temp 98 7 °F (37 1 °C) (Temporal)   Resp 16   Ht 5' 2" (1 575 m)   Wt 84 8 kg (186 lb 14 4 oz)   SpO2 98%   BMI 34 18 kg/m²          Physical Exam   Constitutional: She is oriented to person, place, and time  She appears well-developed and well-nourished  No distress  Musculoskeletal:   PROM of right hip joint intact without difficulty  No crepitus or sign of instability noted  Minimal TTP over the joint  Appears improved compared to last visit  Neurological: She is alert and oriented to person, place, and time  Psychiatric: She has a normal mood and affect  Vitals reviewed

## 2018-10-19 NOTE — LETTER
October 19, 2018     Patient: Venu Rossi   YOB: 1960   Date of Visit: 10/19/2018       To Whom it May Concern:    Venu Rossi is under my professional care  She was seen in my office on 10/19/2018  She may return to work on 10/22/2018  If you have any questions or concerns, please don't hesitate to call           Sincerely,              Vesta Restrepo PA-C

## 2018-10-20 DIAGNOSIS — I16.0 HYPERTENSIVE URGENCY: ICD-10-CM

## 2018-10-20 DIAGNOSIS — F41.9 ANXIETY: ICD-10-CM

## 2018-10-22 RX ORDER — SERTRALINE HYDROCHLORIDE 100 MG/1
TABLET, FILM COATED ORAL
Qty: 45 TABLET | Refills: 5 | Status: SHIPPED | OUTPATIENT
Start: 2018-10-22 | End: 2019-10-01 | Stop reason: SDUPTHER

## 2018-10-22 RX ORDER — AMLODIPINE BESYLATE 10 MG/1
10 TABLET ORAL DAILY
Qty: 90 TABLET | Refills: 1 | Status: SHIPPED | OUTPATIENT
Start: 2018-10-22 | End: 2019-10-01 | Stop reason: SDUPTHER

## 2018-11-02 DIAGNOSIS — G89.29 CHRONIC RIGHT HIP PAIN: ICD-10-CM

## 2018-11-02 DIAGNOSIS — M25.551 CHRONIC RIGHT HIP PAIN: ICD-10-CM

## 2018-11-02 RX ORDER — MELOXICAM 15 MG/1
TABLET ORAL
Qty: 30 TABLET | Refills: 0 | Status: SHIPPED | OUTPATIENT
Start: 2018-11-02 | End: 2018-11-27

## 2018-11-08 DIAGNOSIS — E78.2 MIXED HYPERLIPIDEMIA: ICD-10-CM

## 2018-11-08 RX ORDER — ATORVASTATIN CALCIUM 40 MG/1
40 TABLET, FILM COATED ORAL DAILY
Qty: 90 TABLET | Refills: 1 | Status: SHIPPED | OUTPATIENT
Start: 2018-11-08 | End: 2019-10-01 | Stop reason: SDUPTHER

## 2018-11-27 ENCOUNTER — TELEPHONE (OUTPATIENT)
Dept: FAMILY MEDICINE CLINIC | Facility: CLINIC | Age: 58
End: 2018-11-27

## 2018-11-27 ENCOUNTER — OFFICE VISIT (OUTPATIENT)
Dept: FAMILY MEDICINE CLINIC | Facility: CLINIC | Age: 58
End: 2018-11-27
Payer: COMMERCIAL

## 2018-11-27 VITALS
HEART RATE: 93 BPM | SYSTOLIC BLOOD PRESSURE: 142 MMHG | WEIGHT: 176.4 LBS | BODY MASS INDEX: 32.46 KG/M2 | TEMPERATURE: 98.9 F | OXYGEN SATURATION: 99 % | DIASTOLIC BLOOD PRESSURE: 68 MMHG | RESPIRATION RATE: 16 BRPM | HEIGHT: 62 IN

## 2018-11-27 DIAGNOSIS — K92.1 HEMATOCHEZIA: ICD-10-CM

## 2018-11-27 DIAGNOSIS — G89.29 CHRONIC LEFT-SIDED LOW BACK PAIN WITH LEFT-SIDED SCIATICA: ICD-10-CM

## 2018-11-27 DIAGNOSIS — R19.7 DIARRHEA, UNSPECIFIED TYPE: ICD-10-CM

## 2018-11-27 DIAGNOSIS — R63.4 WEIGHT LOSS: ICD-10-CM

## 2018-11-27 DIAGNOSIS — Z12.11 SCREEN FOR COLON CANCER: ICD-10-CM

## 2018-11-27 DIAGNOSIS — R14.0 ABDOMINAL BLOATING: Primary | ICD-10-CM

## 2018-11-27 DIAGNOSIS — M54.42 CHRONIC LEFT-SIDED LOW BACK PAIN WITH LEFT-SIDED SCIATICA: ICD-10-CM

## 2018-11-27 PROBLEM — J18.9 PNEUMONIA OF BOTH LOWER LOBES DUE TO INFECTIOUS ORGANISM: Status: RESOLVED | Noted: 2018-02-23 | Resolved: 2018-11-27

## 2018-11-27 PROBLEM — G93.40 ACUTE ENCEPHALOPATHY: Status: RESOLVED | Noted: 2018-02-24 | Resolved: 2018-11-27

## 2018-11-27 PROCEDURE — 3008F BODY MASS INDEX DOCD: CPT | Performed by: PHYSICIAN ASSISTANT

## 2018-11-27 PROCEDURE — 99214 OFFICE O/P EST MOD 30 MIN: CPT | Performed by: PHYSICIAN ASSISTANT

## 2018-11-27 PROCEDURE — 1036F TOBACCO NON-USER: CPT | Performed by: PHYSICIAN ASSISTANT

## 2018-11-27 RX ORDER — DICYCLOMINE HCL 20 MG
20 TABLET ORAL EVERY 6 HOURS
Qty: 30 TABLET | Refills: 1 | Status: SHIPPED | OUTPATIENT
Start: 2018-11-27 | End: 2019-10-01 | Stop reason: ALTCHOICE

## 2018-11-27 NOTE — PROGRESS NOTES
Assessment/Plan:      Diagnoses and all orders for this visit:    Abdominal bloating  -     Ambulatory referral to Gastroenterology; Future  -     Clostridium difficile toxin by PCR  -     Stool Enteric Bacterial Panel by PCR  -     Ova and parasite examination    Hematochezia  -     Ambulatory referral to Gastroenterology; Future  -     Clostridium difficile toxin by PCR  -     Stool Enteric Bacterial Panel by PCR  -     Ova and parasite examination    Weight loss  -     Ambulatory referral to Gastroenterology; Future  -     Clostridium difficile toxin by PCR  -     Stool Enteric Bacterial Panel by PCR  -     Ova and parasite examination    Diarrhea, unspecified type  -     Ambulatory referral to Gastroenterology; Future  -     dicyclomine (BENTYL) 20 mg tablet; Take 1 tablet (20 mg total) by mouth every 6 (six) hours As needed for bloating/diarrhea  -     Clostridium difficile toxin by PCR  -     Stool Enteric Bacterial Panel by PCR  -     Ova and parasite examination    Chronic left-sided low back pain with left-sided sciatica    Screen for colon cancer  -     Ambulatory referral to Gastroenterology; Future        63YEAR-OLD FEMALE PRESENTING TODAY FOR ONGOING BOUTS OF EXPLOSIVE DIARRHEA AS WELL AS ABDOMINAL BLOATING  SHE DID NO RECENT BLOOD IN THE STOOL WITH A BOWEL MOVEMENT AS WELL  SHE IS NOT UP-TO-DATE WITH COLONOSCOPY SCREENING  I REFERRED HER TO GI FOR FURTHER EVALUATION OF HER SYMPTOMS AS THE EXACT ETIOLOGY IS UNCLEAR AND ALSO TO CONSIDER SCREENING VERSUS DIAGNOSTIC COLONOSCOPY  I WILL HAVE HER COLLECT STOOL SAMPLE TO SEND OUT FOR TESTING TO RULE OUT BACTERIA OR PARASITE INFECTION  IN THE MEANTIME I WILL HAVE HER TAKE BENTYL ON AN AS NEEDED BASIS FOR THE SYMPTOMS  SHE WAS ENCOURAGED TO FOLLOW UP BRAT DIET, CLEAR FLUIDS FOR HYDRATION AS WELL AS CONTINUE PROBIOTIC  SHE IS TO CALL OR FOLLOW UP WITH PERSISTENT OR WORSENING SYMPTOMS IN THE INTERIM      PATIENT ALSO BRINGING PAPERWORK FOR ONGOING CHRONIC LEFT-SIDED LOW BACK PAIN  SHE HAD FMLA AND SHORT-TERM DISABILITY FOR THIS ISSUE AND STATES THAT SHE TRIED TO GO BACK BUT STARTED WITH PAIN AGAIN  IT IS BEEN ABOUT 2 MORE WEEKS AND SHE IS FEELING WELL AND WOULD LIKE TO RETURN  FORM TO BE COMPLETED BY OFFICE STAFF  SHE IS TO NOTIFY THE OFFICE IF SYMPTOMS RETURN AGAIN AND I WILL CONSIDER PT VERSUS REFERRAL  Chief Complaint   Patient presents with    GI Problem      x 1month pt is having all of a sudden urges to release her bowels everyday  Subjective:     Patient ID: Al Baltazar is a 63 y o  female     60y/o female here today for discussion of GI sxs  States has had 3 bouts of explosive diarrhea within past year  Most recent episode was about a month ago  States sxs occur every day and with every BM  She did have some associated bloating which pt started a probiotic which helps  She still has some explosive diarrhea but not as bad as it was  Pt has never had a colonoscopy before  She tried imodium which seemed to help but then stopped working  She has not changed anything with diet  She was put on meloxicam for back and hip but since d/c that  States she does have some blood in stool and toilet paper when she wipes  No rectal pain or itching  She also has had ongoing back issues  She had short term [employer] and tried returning back but states it was too soon  She has no pain currently and would like to return  She is doing stretches and walking at home and doing well  States she is a  which aggravated her pain  Her sugars at home are 170's fasting in AM  She has not been as hungry, she has lost about 13lbs since October  She also is fatigued all of the time  Pt increased to 40units lantus at bedtime  She takes novolog twice a day with lunch and dinner  No recent travel, no recent abx use  Review of Systems   Constitutional: Negative  Respiratory: Negative  Cardiovascular: Negative      Gastrointestinal:        As in HPI Genitourinary: Negative  Neurological: Negative  Psychiatric/Behavioral: Negative  The following portions of the patient's history were reviewed and updated as appropriate: allergies, current medications, past family history, past medical history, past social history, past surgical history and problem list       Objective:     Physical Exam   Constitutional: She is oriented to person, place, and time  She appears well-developed and well-nourished  No distress  Neck: Neck supple  Cardiovascular: Normal rate, regular rhythm, normal heart sounds and intact distal pulses  Pulmonary/Chest: Effort normal and breath sounds normal    Abdominal: Soft  Bowel sounds are normal  She exhibits no distension and no mass  There is tenderness (MILD , GENERALIZED)  There is no rebound and no guarding  Neurological: She is alert and oriented to person, place, and time  Psychiatric: She has a normal mood and affect  Vitals reviewed        Vitals:    11/27/18 1304   BP: 142/68   BP Location: Left arm   Patient Position: Sitting   Cuff Size: Adult   Pulse: 93   Resp: 16   Temp: 98 9 °F (37 2 °C)   TempSrc: Tympanic   SpO2: 99%   Weight: 80 kg (176 lb 6 4 oz)   Height: 5' 2" (1 575 m)

## 2018-11-27 NOTE — TELEPHONE ENCOUNTER
I called pt, she has hx of GERD, no active sxs and has been off PPI for over a year now per pt  I told pt to start it and if she has problems with GERD sxs to stop it

## 2018-11-27 NOTE — TELEPHONE ENCOUNTER
[facility] pharmacist called stating that there is a contraindication with pt's Bentyl? She stated pt has a Dx of GERD and would like to know if pt still has condition and if so do you still want pharmacy tofill Rx

## 2018-12-09 DIAGNOSIS — F41.9 ANXIETY: ICD-10-CM

## 2018-12-19 RX ORDER — ALPRAZOLAM 0.5 MG/1
TABLET ORAL
Qty: 90 TABLET | Refills: 0 | OUTPATIENT
Start: 2018-12-19

## 2019-01-09 DIAGNOSIS — IMO0002 UNCONTROLLED TYPE 2 DIABETES WITH NEUROPATHY: ICD-10-CM

## 2019-01-09 RX ORDER — INSULIN ASPART 100 [IU]/ML
5 INJECTION, SOLUTION INTRAVENOUS; SUBCUTANEOUS
Refills: 1 | OUTPATIENT
Start: 2019-01-09

## 2019-01-09 NOTE — TELEPHONE ENCOUNTER
Please call pt - last appt with TZ 10/2, uncontrolled diabetes  Should have another f/u this month to recheck A1C which we can do on our machine in the office   nonfasting

## 2019-01-26 DIAGNOSIS — M19.90 ARTHRITIS: Primary | ICD-10-CM

## 2019-01-26 DIAGNOSIS — G89.29 CHRONIC RIGHT HIP PAIN: ICD-10-CM

## 2019-01-26 DIAGNOSIS — F41.9 ANXIETY: ICD-10-CM

## 2019-01-26 DIAGNOSIS — M25.551 CHRONIC RIGHT HIP PAIN: ICD-10-CM

## 2019-01-26 DIAGNOSIS — IMO0002 UNCONTROLLED TYPE 2 DIABETES WITH NEUROPATHY: ICD-10-CM

## 2019-01-26 RX ORDER — ALPRAZOLAM 0.5 MG/1
TABLET ORAL
Qty: 90 TABLET | Refills: 0 | OUTPATIENT
Start: 2019-01-26

## 2019-01-26 RX ORDER — MELOXICAM 15 MG/1
TABLET ORAL
Qty: 30 TABLET | Refills: 0 | OUTPATIENT
Start: 2019-01-26

## 2019-01-28 NOTE — TELEPHONE ENCOUNTER
If taken out of med list, please see if pt requesting meloxicam or if she doesn't need it and just sent auto from pharmacy   thanks

## 2019-01-29 RX ORDER — MELOXICAM 15 MG/1
15 TABLET ORAL DAILY
Qty: 30 TABLET | Refills: 0 | Status: CANCELLED | OUTPATIENT
Start: 2019-01-29

## 2019-01-29 RX ORDER — PEN NEEDLE, DIABETIC 32GX 5/32"
NEEDLE, DISPOSABLE MISCELLANEOUS
Qty: 100 EACH | Refills: 1 | Status: SHIPPED | OUTPATIENT
Start: 2019-01-29 | End: 2019-10-01 | Stop reason: ALTCHOICE

## 2019-01-29 RX ORDER — ALPRAZOLAM 0.5 MG/1
0.5 TABLET ORAL 3 TIMES DAILY PRN
Qty: 90 TABLET | Refills: 0 | Status: SHIPPED | OUTPATIENT
Start: 2019-01-29 | End: 2019-03-11 | Stop reason: SDUPTHER

## 2019-01-31 PROBLEM — M19.90 ARTHRITIS: Status: ACTIVE | Noted: 2019-01-31

## 2019-01-31 RX ORDER — MELOXICAM 15 MG/1
15 TABLET ORAL DAILY PRN
Qty: 30 TABLET | Refills: 3 | Status: SHIPPED | OUTPATIENT
Start: 2019-01-31 | End: 2019-10-01 | Stop reason: ALTCHOICE

## 2019-01-31 NOTE — TELEPHONE ENCOUNTER
Spoke to pt she is still taking Meloxicam for antritis she stated she did stop for a little, but rx does help so she restarted  Please sign off on rx for [facility] [place]

## 2019-03-11 DIAGNOSIS — F41.9 ANXIETY: ICD-10-CM

## 2019-03-11 RX ORDER — ALPRAZOLAM 0.5 MG/1
0.5 TABLET ORAL 3 TIMES DAILY PRN
Qty: 90 TABLET | Refills: 0 | Status: SHIPPED | OUTPATIENT
Start: 2019-03-11 | End: 2019-10-01 | Stop reason: SDUPTHER

## 2019-04-28 DIAGNOSIS — F41.9 ANXIETY: ICD-10-CM

## 2019-04-28 RX ORDER — SERTRALINE HYDROCHLORIDE 100 MG/1
TABLET, FILM COATED ORAL
Qty: 45 TABLET | Refills: 4 | OUTPATIENT
Start: 2019-04-28

## 2019-06-05 DIAGNOSIS — E78.2 MIXED HYPERLIPIDEMIA: ICD-10-CM

## 2019-06-05 DIAGNOSIS — I16.0 HYPERTENSIVE URGENCY: ICD-10-CM

## 2019-06-05 RX ORDER — AMLODIPINE BESYLATE 10 MG/1
10 TABLET ORAL DAILY
Qty: 90 TABLET | Refills: 1 | OUTPATIENT
Start: 2019-06-05

## 2019-06-05 RX ORDER — ATORVASTATIN CALCIUM 40 MG/1
40 TABLET, FILM COATED ORAL DAILY
Qty: 90 TABLET | Refills: 1 | OUTPATIENT
Start: 2019-06-05

## 2019-06-09 DIAGNOSIS — I16.0 HYPERTENSIVE URGENCY: ICD-10-CM

## 2019-06-10 DIAGNOSIS — I16.0 HYPERTENSIVE URGENCY: ICD-10-CM

## 2019-06-11 DIAGNOSIS — I16.0 HYPERTENSIVE URGENCY: ICD-10-CM

## 2019-06-11 RX ORDER — METOPROLOL SUCCINATE 100 MG/1
100 TABLET, EXTENDED RELEASE ORAL DAILY
Qty: 90 TABLET | Refills: 1 | Status: SHIPPED | OUTPATIENT
Start: 2019-06-11 | End: 2019-10-01 | Stop reason: SDUPTHER

## 2019-06-11 RX ORDER — METOPROLOL SUCCINATE 100 MG/1
100 TABLET, EXTENDED RELEASE ORAL DAILY
Qty: 90 TABLET | Refills: 1 | OUTPATIENT
Start: 2019-06-11

## 2019-06-11 RX ORDER — LISINOPRIL 40 MG/1
40 TABLET ORAL DAILY
Qty: 90 TABLET | Refills: 1 | Status: SHIPPED | OUTPATIENT
Start: 2019-06-11 | End: 2019-10-01 | Stop reason: SDUPTHER

## 2019-06-11 RX ORDER — LISINOPRIL 40 MG/1
40 TABLET ORAL DAILY
Qty: 90 TABLET | Refills: 1 | OUTPATIENT
Start: 2019-06-11

## 2019-06-29 DIAGNOSIS — E78.2 MIXED HYPERLIPIDEMIA: ICD-10-CM

## 2019-06-29 DIAGNOSIS — I16.0 HYPERTENSIVE URGENCY: ICD-10-CM

## 2019-06-30 RX ORDER — AMLODIPINE BESYLATE 10 MG/1
10 TABLET ORAL DAILY
Qty: 90 TABLET | Refills: 1 | OUTPATIENT
Start: 2019-06-30

## 2019-06-30 RX ORDER — ATORVASTATIN CALCIUM 40 MG/1
40 TABLET, FILM COATED ORAL DAILY
Qty: 90 TABLET | Refills: 1 | OUTPATIENT
Start: 2019-06-30

## 2019-07-10 DIAGNOSIS — IMO0002 UNCONTROLLED TYPE 2 DIABETES WITH NEUROPATHY: ICD-10-CM

## 2019-07-10 RX ORDER — INSULIN GLARGINE 100 [IU]/ML
INJECTION, SOLUTION SUBCUTANEOUS
Refills: 3 | OUTPATIENT
Start: 2019-07-10

## 2019-09-23 DIAGNOSIS — F41.9 ANXIETY: ICD-10-CM

## 2019-09-23 RX ORDER — SERTRALINE HYDROCHLORIDE 100 MG/1
TABLET, FILM COATED ORAL
Qty: 135 TABLET | Refills: 1 | OUTPATIENT
Start: 2019-09-23

## 2019-10-01 ENCOUNTER — OFFICE VISIT (OUTPATIENT)
Dept: FAMILY MEDICINE CLINIC | Facility: CLINIC | Age: 59
End: 2019-10-01
Payer: COMMERCIAL

## 2019-10-01 VITALS
BODY MASS INDEX: 35.03 KG/M2 | HEIGHT: 64 IN | SYSTOLIC BLOOD PRESSURE: 190 MMHG | WEIGHT: 205.2 LBS | HEART RATE: 86 BPM | RESPIRATION RATE: 17 BRPM | DIASTOLIC BLOOD PRESSURE: 90 MMHG | TEMPERATURE: 97.9 F | OXYGEN SATURATION: 96 %

## 2019-10-01 DIAGNOSIS — I10 BENIGN ESSENTIAL HYPERTENSION: ICD-10-CM

## 2019-10-01 DIAGNOSIS — R30.0 DYSURIA: ICD-10-CM

## 2019-10-01 DIAGNOSIS — Z13.29 SCREENING FOR THYROID DISORDER: ICD-10-CM

## 2019-10-01 DIAGNOSIS — Z23 INFLUENZA VACCINATION ADMINISTERED AT CURRENT VISIT: ICD-10-CM

## 2019-10-01 DIAGNOSIS — E78.2 MIXED HYPERLIPIDEMIA: ICD-10-CM

## 2019-10-01 DIAGNOSIS — E55.9 VITAMIN D DEFICIENCY: ICD-10-CM

## 2019-10-01 DIAGNOSIS — IMO0002 UNCONTROLLED TYPE 2 DIABETES WITH NEUROPATHY: Primary | ICD-10-CM

## 2019-10-01 DIAGNOSIS — Z23 PNEUMOCOCCAL VACCINATION ADMINISTERED AT CURRENT VISIT: ICD-10-CM

## 2019-10-01 DIAGNOSIS — F41.9 ANXIETY: ICD-10-CM

## 2019-10-01 PROBLEM — E66.09 CLASS 1 OBESITY DUE TO EXCESS CALORIES WITH SERIOUS COMORBIDITY AND BODY MASS INDEX (BMI) OF 33.0 TO 33.9 IN ADULT: Status: RESOLVED | Noted: 2018-02-24 | Resolved: 2019-10-01

## 2019-10-01 PROBLEM — M54.42 LEFT-SIDED LOW BACK PAIN WITH SCIATICA: Status: RESOLVED | Noted: 2017-05-08 | Resolved: 2019-10-01

## 2019-10-01 PROBLEM — E66.811 CLASS 1 OBESITY DUE TO EXCESS CALORIES WITH SERIOUS COMORBIDITY AND BODY MASS INDEX (BMI) OF 33.0 TO 33.9 IN ADULT: Status: RESOLVED | Noted: 2018-02-24 | Resolved: 2019-10-01

## 2019-10-01 PROBLEM — J96.00 ACUTE RESPIRATORY FAILURE (HCC): Status: RESOLVED | Noted: 2018-02-24 | Resolved: 2019-10-01

## 2019-10-01 LAB
BACTERIA UR QL AUTO: ABNORMAL /HPF
BILIRUB UR QL STRIP: NEGATIVE
CLARITY UR: ABNORMAL
COLOR UR: YELLOW
GLUCOSE UR STRIP-MCNC: ABNORMAL MG/DL
HGB UR QL STRIP.AUTO: ABNORMAL
HYALINE CASTS #/AREA URNS LPF: ABNORMAL /LPF
KETONES UR STRIP-MCNC: NEGATIVE MG/DL
LEUKOCYTE ESTERASE UR QL STRIP: ABNORMAL
NITRITE UR QL STRIP: NEGATIVE
NON-SQ EPI CELLS URNS QL MICRO: ABNORMAL /HPF
PH UR STRIP.AUTO: 5.5 [PH]
PROT UR STRIP-MCNC: ABNORMAL MG/DL
RBC #/AREA URNS AUTO: ABNORMAL /HPF
SL AMB  POCT GLUCOSE, UA: ABNORMAL
SL AMB LEUKOCYTE ESTERASE,UA: NEGATIVE
SL AMB POCT BILIRUBIN,UA: NEGATIVE
SL AMB POCT BLOOD,UA: ABNORMAL
SL AMB POCT CLARITY,UA: ABNORMAL
SL AMB POCT COLOR,UA: YELLOW
SL AMB POCT HEMOGLOBIN AIC: 11.7 (ref ?–6.5)
SL AMB POCT KETONES,UA: NEGATIVE
SL AMB POCT NITRITE,UA: NEGATIVE
SL AMB POCT PH,UA: 6
SL AMB POCT SPECIFIC GRAVITY,UA: 1.03
SL AMB POCT URINE PROTEIN: ABNORMAL
SL AMB POCT UROBILINOGEN: 0.2
SP GR UR STRIP.AUTO: 1.03 (ref 1–1.03)
UROBILINOGEN UR QL STRIP.AUTO: 0.2 E.U./DL
WBC #/AREA URNS AUTO: ABNORMAL /HPF

## 2019-10-01 PROCEDURE — 99214 OFFICE O/P EST MOD 30 MIN: CPT | Performed by: PHYSICIAN ASSISTANT

## 2019-10-01 PROCEDURE — 83036 HEMOGLOBIN GLYCOSYLATED A1C: CPT | Performed by: PHYSICIAN ASSISTANT

## 2019-10-01 PROCEDURE — 87077 CULTURE AEROBIC IDENTIFY: CPT | Performed by: PHYSICIAN ASSISTANT

## 2019-10-01 PROCEDURE — 3008F BODY MASS INDEX DOCD: CPT | Performed by: PHYSICIAN ASSISTANT

## 2019-10-01 PROCEDURE — 81001 URINALYSIS AUTO W/SCOPE: CPT | Performed by: PHYSICIAN ASSISTANT

## 2019-10-01 PROCEDURE — 90471 IMMUNIZATION ADMIN: CPT | Performed by: PHYSICIAN ASSISTANT

## 2019-10-01 PROCEDURE — 93000 ELECTROCARDIOGRAM COMPLETE: CPT | Performed by: PHYSICIAN ASSISTANT

## 2019-10-01 PROCEDURE — 81003 URINALYSIS AUTO W/O SCOPE: CPT | Performed by: PHYSICIAN ASSISTANT

## 2019-10-01 PROCEDURE — 87186 SC STD MICRODIL/AGAR DIL: CPT | Performed by: PHYSICIAN ASSISTANT

## 2019-10-01 PROCEDURE — 90732 PPSV23 VACC 2 YRS+ SUBQ/IM: CPT | Performed by: PHYSICIAN ASSISTANT

## 2019-10-01 PROCEDURE — 90682 RIV4 VACC RECOMBINANT DNA IM: CPT | Performed by: PHYSICIAN ASSISTANT

## 2019-10-01 PROCEDURE — 90472 IMMUNIZATION ADMIN EACH ADD: CPT | Performed by: PHYSICIAN ASSISTANT

## 2019-10-01 PROCEDURE — 87086 URINE CULTURE/COLONY COUNT: CPT | Performed by: PHYSICIAN ASSISTANT

## 2019-10-01 RX ORDER — METOPROLOL SUCCINATE 100 MG/1
100 TABLET, EXTENDED RELEASE ORAL DAILY
Qty: 90 TABLET | Refills: 0 | Status: SHIPPED | OUTPATIENT
Start: 2019-10-01 | End: 2020-02-13 | Stop reason: SDUPTHER

## 2019-10-01 RX ORDER — MULTIVITAMIN
1 TABLET ORAL DAILY
COMMUNITY
End: 2020-12-02 | Stop reason: DRUGHIGH

## 2019-10-01 RX ORDER — LISINOPRIL 40 MG/1
40 TABLET ORAL DAILY
Qty: 90 TABLET | Refills: 0 | Status: SHIPPED | OUTPATIENT
Start: 2019-10-01 | End: 2020-02-13 | Stop reason: SDUPTHER

## 2019-10-01 RX ORDER — AMLODIPINE BESYLATE 10 MG/1
10 TABLET ORAL DAILY
Qty: 90 TABLET | Refills: 0 | Status: SHIPPED | OUTPATIENT
Start: 2019-10-01 | End: 2020-02-13 | Stop reason: SDUPTHER

## 2019-10-01 RX ORDER — ALPRAZOLAM 0.5 MG/1
0.5 TABLET ORAL 3 TIMES DAILY PRN
Qty: 90 TABLET | Refills: 0 | Status: SHIPPED | OUTPATIENT
Start: 2019-10-01 | End: 2019-11-28 | Stop reason: SDUPTHER

## 2019-10-01 RX ORDER — GABAPENTIN 100 MG/1
100 CAPSULE ORAL 3 TIMES DAILY
Qty: 90 CAPSULE | Refills: 1 | Status: SHIPPED | OUTPATIENT
Start: 2019-10-01 | End: 2019-11-27 | Stop reason: SDUPTHER

## 2019-10-01 RX ORDER — BLOOD SUGAR DIAGNOSTIC
STRIP MISCELLANEOUS
Qty: 100 EACH | Refills: 1 | Status: SHIPPED | OUTPATIENT
Start: 2019-10-01 | End: 2020-12-03 | Stop reason: SDUPTHER

## 2019-10-01 RX ORDER — SERTRALINE HYDROCHLORIDE 25 MG/1
150 TABLET, FILM COATED ORAL DAILY
Qty: 30 TABLET | Refills: 1 | Status: SHIPPED | OUTPATIENT
Start: 2019-10-01 | End: 2019-11-04

## 2019-10-01 RX ORDER — ATORVASTATIN CALCIUM 40 MG/1
40 TABLET, FILM COATED ORAL DAILY
Qty: 90 TABLET | Refills: 0 | Status: SHIPPED | OUTPATIENT
Start: 2019-10-01 | End: 2020-02-12 | Stop reason: HOSPADM

## 2019-10-01 NOTE — PROGRESS NOTES
Assessment/Plan:    1  Uncontrolled type 2 diabetes with neuropathy ([facility])  A1c 11 7 today  Last year it was 8 8  Rapid increased likely due to noncompliance with medications  Patient states she has difficulty affording her medications  Insulin can be expensive  Will take patient off of NovoLog and continue Lantus  Restart Lantus at 20 units daily at bedtime  Discussed starting metformin  Patient was agreeable  Reviewed labs from last year, her GFR was normal at the time  Discussed titration  Start with 500 mg daily for 1 week, increase to 500 mg twice daily for 1 week, then a 1000 mg in the morning and 500 mg in the evening for 1 week then 1000 mg twice daily  Discussed possible side effects  Call with any concerns regarding GI upset or diarrhea  Discussed her urine dip  Urinary symptoms likely due to uncontrolled diabetes  Will send urine for urinalysis and culture  If needed will send antibiotic for UTI  Drink plenty of water and stay hydrated  Encouraged healthy well-balanced diet  Decrease carb intake and increase the vegetable intake  Recommended eating lean meats  Increase activity level  For neuropathy I discussed gabapentin  Discussed treatment course and possible side effects  Patient was agreeable to start  Start gabapentin once daily at bedtime  If tolerated after 1 week can increase to 3 times daily  Normal foot exam today  Recommended seen an ophthalmologist as soon as possible  Patient agreed  Will discuss case with Durward Krabbe, RN to see if there is anything we can do to help patient afford her medications  Get labs done as soon as possible and follow up in 2 weeks  - insulin glargine (LANTUS SOLOSTAR) 100 units/mL injection pen; Inject 30 Units under the skin daily at bedtime  Dispense: 5 pen; Refill: 1  - Insulin Pen Needle (PEN NEEDLES) 32G X 5 MM MISC; Use with insulin four times daily  Dispense: 100 each; Refill: 1  - gabapentin (NEURONTIN) 100 mg capsule;  Take 1 capsule (100 mg total) by mouth 3 (three) times a day  Dispense: 90 capsule; Refill: 1  - metFORMIN (GLUCOPHAGE) 500 mg tablet; Take 2 tablets (1,000 mg total) by mouth 2 (two) times a day with meals  Dispense: 120 tablet; Refill: 1  - CBC and differential; Future  - Vitamin B12; Future  - POCT hemoglobin A1c  - UA (URINE) with reflex to Microscopic  - Urine culture  - Urine Microscopic    2  Benign essential hypertension  Uncontrolled  Restart amlodipine, lisinopril, and metoprolol  Limit sodium, alcohol, and caffeine  Will recheck in 2 weeks  - amLODIPine (NORVASC) 10 mg tablet; Take 1 tablet (10 mg total) by mouth daily  Dispense: 90 tablet; Refill: 0  - lisinopril (ZESTRIL) 40 mg tablet; Take 1 tablet (40 mg total) by mouth daily  Dispense: 90 tablet; Refill: 0  - metoprolol succinate (TOPROL-XL) 100 mg 24 hr tablet; Take 1 tablet (100 mg total) by mouth daily  Dispense: 90 tablet; Refill: 0  - POCT ECG  - Comprehensive metabolic panel; Future  - Microalbumin / creatinine urine ratio; Future    3  Mixed hyperlipidemia  Previously controlled on Lipitor  Restart Lipitor 40 mg daily  Will recheck lipid panel  Low-fat diet  Discussed increasing activity level   - atorvastatin (LIPITOR) 40 mg tablet; Take 1 tablet (40 mg total) by mouth daily  Dispense: 90 tablet; Refill: 0  - Lipid panel; Future    4  Anxiety  Discussed treatment options  Cannot start patient back on 150 mg daily  Restart 25 mg daily as she has been out for 2-3 months  Will discuss increasing in 2 weeks at her follow-up appointment  Continue Xanax as needed  Checked PDMP, unremarkable  Patient understands risk for addiction and tolerance  - ALPRAZolam (XANAX) 0 5 mg tablet; Take 1 tablet (0 5 mg total) by mouth 3 (three) times a day as needed for anxiety  Dispense: 90 tablet; Refill: 0    5  Vitamin D deficiency  Will recheck vitamin-D  Previously deficient  - Vitamin D 25 hydroxy; Future    6   Influenza vaccination administered at current visit  Influenza vaccine given today  Patient tolerated well  - influenza vaccine, 1515-5700, quadrivalent, recombinant, PF, 0 5 mL, for patients 18 yr+ (FLUBLOK)    7  Pneumococcal vaccination administered at current visit  Patient does not recall ever having a pneumonia vaccine  None on file  Discussed risks, benefits, and recommendations  Patient was agreeable  Pneumovax given in office today  Patient tolerated well  - PNEUMOCOCCAL POLYSACCHARIDE VACCINE 23-VALENT =>3YO SQ IM    8  Screening for thyroid disorder  - TSH, 3rd generation with Free T4 reflex; Future    No problem-specific Assessment & Plan notes found for this encounter  Patient Active Problem List   Diagnosis    Esophageal reflux    Uncontrolled type 2 diabetes with neuropathy (HCC)    Depression    Anxiety    Benign essential hypertension    Cerebral infarction ([facility] [address] )    Diabetic peripheral neuropathy (HCC)    Vitamin D deficiency    Systolic murmur    Familial combined hyperlipidemia    Arthritis     Subjective:      Patient ID: Irina Ramirez is a 63 y o  female  Patient is here for follow-up and medication refills  She has been out of all her medications for 2-3 months  She states she has not been here in a year and she ran out of refills  She also states sometimes she has difficulty getting her medications due to cost   She has a history of type 2 diabetes  It has not been well controlled in the past   She is currently not checking her sugars  States she does need a new glucometer  She states she does not have the money for currently  She was on 30 units of Lantus every night and 5 units of NovoLog 3 times daily with her meals  When asked if she was ever on oral medications patient states she was prior to her stroke  In 2015 she was hospitalized with a stroke  At the time she was taking Janumet  States they took her off of it and start her on insulin  She is not sure why    She does recall Janumet was expensive and she had a difficult time affording it  She would prefer not to be on her mealtime insulin going forward as she states she has a hard time bringing it to work and her mealtime is inconsistent  She does admit to history of peripheral neuropathy  She states it is from her calves down to her toes  Admits to numbness and tingling  She states the pain is relatively tolerable  She has never been on a medication for this  She states she was on meloxicam in the past for her arthritis, but it stopped working  States she has been taking Advil every day throughout the day  She is unsure how much she is taking, but states it is probably too high  Denies dizziness and confusion  States her vision has been getting worse over the years, but denies blurry or double vision  Denies spots in her vision  Denies floaters  She did have an iris eye exam last year that stated she likely has retinopathy  States she has been unable to follow up with an ophthalmologist   Denies chest pain and palpitations  She does admit to shortness of breath  She states this has been ongoing for a long time  It mostly occurs when she lays down at night and when she exerts herself  Denies any associated symptoms  She does admit to increased hunger and thirst   States she is always thirsty  Denies tremor  Denies any weakness  She has a history of hypertension  She was previously on amlodipine, metoprolol, and lisinopril  Denies any medication side effects  Admits to history of hyperlipidemia  Previously on atorvastatin  Denies medication side effects  Denies history of coronary artery disease  She admits to history of anxiety  She was on sertraline 150 mg daily  She states this worked well for her  She also took Xanax as needed  States her mind races at nighttime and she usually to get at bedtime to help her rest and occasionally during the day when her anxiety flared  Denies a history of depression  Denies suicidal thoughts or ideation  She states she did have pneumonia several years ago  She states she was told she did not need a pneumonia vaccine  She is also complaining of urinary frequency and urgency  She states this has been present for approximately 1 week  Denies dysuria, but states she has pressure when she urinates  Denies hematuria  Admits to a strong odor  She states it is a sweet smell  Denies abdominal pain  Denies nausea and vomiting  Denies fever  Denies back pain  The following portions of the patient's history were reviewed and updated as appropriate: allergies, current medications, past family history, past medical history, past social history, past surgical history and problem list     Review of Systems   Constitutional: Positive for appetite change and fatigue  Negative for activity change, chills, diaphoresis, fever and unexpected weight change  HENT: Negative for congestion, ear pain, postnasal drip, rhinorrhea, sinus pressure, sinus pain and sore throat  Eyes: Positive for visual disturbance  Negative for photophobia, pain, discharge, redness and itching  Respiratory: Positive for shortness of breath  Negative for cough, chest tightness and wheezing  Cardiovascular: Negative for chest pain, palpitations and leg swelling  Gastrointestinal: Negative for abdominal pain, blood in stool, constipation, diarrhea, nausea and vomiting  Endocrine: Positive for polydipsia, polyphagia and polyuria  Negative for cold intolerance and heat intolerance  Genitourinary: Positive for frequency and urgency  Negative for decreased urine volume, difficulty urinating, dysuria, flank pain, genital sores, hematuria, pelvic pain and vaginal discharge  Musculoskeletal: Positive for arthralgias  Negative for back pain, gait problem, joint swelling, myalgias, neck pain and neck stiffness  Skin: Negative for color change, pallor and rash  Neurological: Positive for numbness  Negative for dizziness, tremors, seizures, syncope, speech difficulty, weakness, light-headedness and headaches  Hematological: Negative for adenopathy  Does not bruise/bleed easily  Psychiatric/Behavioral: Negative for dysphoric mood, self-injury, sleep disturbance and suicidal ideas  The patient is nervous/anxious  Objective:      BP (!) 190/90 (BP Location: Left arm, Patient Position: Sitting, Cuff Size: Standard)   Pulse 86   Temp 97 9 °F (36 6 °C) (Tympanic)   Resp 17   Ht 5' 4" (1 626 m)   Wt 93 1 kg (205 lb 3 2 oz)   SpO2 96%   BMI 35 22 kg/m²          Physical Exam   Constitutional: She is oriented to person, place, and time  She appears well-developed and well-nourished  HENT:   Head: Normocephalic and atraumatic  Right Ear: Hearing, tympanic membrane, external ear and ear canal normal    Left Ear: Hearing, tympanic membrane, external ear and ear canal normal    Nose: Nose normal    Mouth/Throat: Uvula is midline, oropharynx is clear and moist and mucous membranes are normal    Eyes: Pupils are equal, round, and reactive to light  Conjunctivae are normal    Neck: Normal range of motion  Neck supple  Cardiovascular: Normal rate, regular rhythm, S1 normal, S2 normal, normal heart sounds, intact distal pulses and normal pulses  Pulses are no weak pulses  Pulses:       Dorsalis pedis pulses are 2+ on the right side, and 2+ on the left side  Posterior tibial pulses are 2+ on the right side, and 2+ on the left side  Pulmonary/Chest: Effort normal and breath sounds normal    Abdominal: Soft  Normal appearance and bowel sounds are normal  She exhibits no mass  There is no hepatosplenomegaly  There is no tenderness  Musculoskeletal: Normal range of motion  Feet:   Right Foot:   Skin Integrity: Negative for ulcer, skin breakdown, erythema, warmth, callus or dry skin  Left Foot:   Skin Integrity: Negative for ulcer, skin breakdown, erythema, warmth, callus or dry skin  Lymphadenopathy:     She has no cervical adenopathy  Neurological: She is alert and oriented to person, place, and time  She has normal strength and normal reflexes  Skin: Skin is warm, dry and intact  No rash noted  Psychiatric: She has a normal mood and affect  Her behavior is normal  Judgment and thought content normal    Nursing note and vitals reviewed  Patient's shoes and socks removed  Right Foot/Ankle   Right Foot Inspection  Skin Exam: skin normal and skin intact no dry skin, no warmth, no callus, no erythema, no maceration, no abnormal color, no pre-ulcer, no ulcer and no callus                          Toe Exam: ROM and strength within normal limits  Sensory     Proprioception: intact   Monofilament testing: intact  Vascular  Capillary refills: < 3 seconds  The right DP pulse is 2+  The right PT pulse is 2+  Left Foot/Ankle  Left Foot Inspection  Skin Exam: skin normal and skin intactno dry skin, no warmth, no erythema, no maceration, normal color, no pre-ulcer, no ulcer and no callus                         Toe Exam: ROM and strength within normal limits                   Sensory     Proprioception: intact  Monofilament: intact  Vascular  Capillary refills: < 3 seconds  The left DP pulse is 2+  The left PT pulse is 2+  Assign Risk Category:  No deformity present; No loss of protective sensation; No weak pulses       Risk: 0      Current Outpatient Medications on File Prior to Visit   Medication Sig Dispense Refill    aspirin 81 mg chewable tablet Chew 81 mg      Multiple Vitamin (MULTIVITAMIN) tablet Take 1 tablet by mouth daily       No current facility-administered medications on file prior to visit

## 2019-10-02 DIAGNOSIS — IMO0002 UNCONTROLLED TYPE 2 DIABETES WITH NEUROPATHY: Primary | ICD-10-CM

## 2019-10-02 DIAGNOSIS — E78.2 MIXED HYPERLIPIDEMIA: ICD-10-CM

## 2019-10-02 DIAGNOSIS — I10 BENIGN ESSENTIAL HYPERTENSION: ICD-10-CM

## 2019-10-03 DIAGNOSIS — N30.01 ACUTE CYSTITIS WITH HEMATURIA: Primary | ICD-10-CM

## 2019-10-03 RX ORDER — SULFAMETHOXAZOLE AND TRIMETHOPRIM 800; 160 MG/1; MG/1
1 TABLET ORAL EVERY 12 HOURS SCHEDULED
Qty: 10 TABLET | Refills: 0 | Status: SHIPPED | OUTPATIENT
Start: 2019-10-03 | End: 2019-10-08

## 2019-10-04 LAB
BACTERIA UR CULT: ABNORMAL
BACTERIA UR CULT: ABNORMAL

## 2019-10-10 ENCOUNTER — PATIENT OUTREACH (OUTPATIENT)
Dept: CASE MANAGEMENT | Facility: OTHER | Age: 59
End: 2019-10-10

## 2019-10-10 NOTE — PROGRESS NOTES
[facility] SW intern attempted to reach out to patient to follow up on PCP referral  [facility] SW intern left message for patient

## 2019-10-22 ENCOUNTER — PATIENT OUTREACH (OUTPATIENT)
Dept: CASE MANAGEMENT | Facility: OTHER | Age: 59
End: 2019-10-22

## 2019-10-22 NOTE — PROGRESS NOTES
[facility] SW intern left message for patient regarding PCP referral for difficulties obtaining medication  Second attempt to reach patient

## 2019-10-24 DIAGNOSIS — IMO0002 UNCONTROLLED TYPE 2 DIABETES WITH NEUROPATHY: ICD-10-CM

## 2019-10-28 RX ORDER — INSULIN GLARGINE 100 [IU]/ML
INJECTION, SOLUTION SUBCUTANEOUS
Refills: 1 | OUTPATIENT
Start: 2019-10-28

## 2019-10-28 RX ORDER — GABAPENTIN 100 MG/1
CAPSULE ORAL
Qty: 90 CAPSULE | Refills: 1 | OUTPATIENT
Start: 2019-10-28

## 2019-11-01 ENCOUNTER — TELEPHONE (OUTPATIENT)
Dept: FAMILY MEDICINE CLINIC | Facility: CLINIC | Age: 59
End: 2019-11-01

## 2019-11-01 DIAGNOSIS — F41.9 ANXIETY: ICD-10-CM

## 2019-11-02 RX ORDER — SERTRALINE HYDROCHLORIDE 100 MG/1
TABLET, FILM COATED ORAL
Qty: 135 TABLET | Refills: 1 | OUTPATIENT
Start: 2019-11-02

## 2019-11-04 DIAGNOSIS — F41.9 ANXIETY: ICD-10-CM

## 2019-11-04 DIAGNOSIS — N30.01 ACUTE CYSTITIS WITH HEMATURIA: Primary | ICD-10-CM

## 2019-11-04 RX ORDER — SULFAMETHOXAZOLE AND TRIMETHOPRIM 800; 160 MG/1; MG/1
1 TABLET ORAL EVERY 12 HOURS SCHEDULED
Qty: 10 TABLET | Refills: 0 | Status: SHIPPED | OUTPATIENT
Start: 2019-11-04 | End: 2019-11-09

## 2019-11-04 RX ORDER — ALPRAZOLAM 0.5 MG/1
0.5 TABLET ORAL 3 TIMES DAILY PRN
Qty: 90 TABLET | Refills: 0 | OUTPATIENT
Start: 2019-11-04

## 2019-11-04 RX ORDER — SERTRALINE HYDROCHLORIDE 100 MG/1
100 TABLET, FILM COATED ORAL DAILY
Qty: 30 TABLET | Refills: 1 | Status: SHIPPED | OUTPATIENT
Start: 2019-11-04 | End: 2019-11-29 | Stop reason: SDUPTHER

## 2019-11-12 ENCOUNTER — PATIENT OUTREACH (OUTPATIENT)
Dept: CASE MANAGEMENT | Facility: OTHER | Age: 59
End: 2019-11-12

## 2019-11-12 NOTE — PROGRESS NOTES
[facility] SW intern left message for gus requesting call back regarding if she was still having difficulties obtaining medication and needing assistance  This is 3rd attempt to reach patient

## 2019-11-27 DIAGNOSIS — IMO0002 UNCONTROLLED TYPE 2 DIABETES WITH NEUROPATHY: ICD-10-CM

## 2019-11-28 DIAGNOSIS — F41.9 ANXIETY: ICD-10-CM

## 2019-11-29 DIAGNOSIS — F41.9 ANXIETY: ICD-10-CM

## 2019-11-29 RX ORDER — GABAPENTIN 100 MG/1
CAPSULE ORAL
Qty: 90 CAPSULE | Refills: 1 | Status: SHIPPED | OUTPATIENT
Start: 2019-11-29 | End: 2020-01-06

## 2019-11-29 RX ORDER — SERTRALINE HYDROCHLORIDE 100 MG/1
TABLET, FILM COATED ORAL
Qty: 30 TABLET | Refills: 1 | Status: SHIPPED | OUTPATIENT
Start: 2019-11-29 | End: 2020-01-03

## 2019-11-29 RX ORDER — ALPRAZOLAM 0.5 MG/1
0.5 TABLET ORAL 3 TIMES DAILY PRN
Qty: 90 TABLET | Refills: 0 | Status: SHIPPED | OUTPATIENT
Start: 2019-11-29 | End: 2020-01-29 | Stop reason: SDUPTHER

## 2019-12-06 DIAGNOSIS — F41.9 ANXIETY: ICD-10-CM

## 2019-12-09 RX ORDER — ALPRAZOLAM 0.5 MG/1
0.5 TABLET ORAL 3 TIMES DAILY PRN
Qty: 90 TABLET | Refills: 0 | Status: SHIPPED | OUTPATIENT
Start: 2019-12-09 | End: 2020-04-14 | Stop reason: SDUPTHER

## 2020-01-03 DIAGNOSIS — F41.9 ANXIETY: ICD-10-CM

## 2020-01-03 RX ORDER — SERTRALINE HYDROCHLORIDE 100 MG/1
TABLET, FILM COATED ORAL
Qty: 90 TABLET | Refills: 0 | Status: SHIPPED | OUTPATIENT
Start: 2020-01-03 | End: 2020-01-05

## 2020-01-05 DIAGNOSIS — IMO0002 UNCONTROLLED TYPE 2 DIABETES WITH NEUROPATHY: ICD-10-CM

## 2020-01-05 DIAGNOSIS — F41.9 ANXIETY: ICD-10-CM

## 2020-01-05 RX ORDER — SERTRALINE HYDROCHLORIDE 100 MG/1
TABLET, FILM COATED ORAL
Qty: 30 TABLET | Refills: 0 | Status: SHIPPED | OUTPATIENT
Start: 2020-01-05 | End: 2020-01-29 | Stop reason: ALTCHOICE

## 2020-01-06 RX ORDER — GABAPENTIN 100 MG/1
CAPSULE ORAL
Qty: 90 CAPSULE | Refills: 0 | Status: SHIPPED | OUTPATIENT
Start: 2020-01-06 | End: 2020-01-29

## 2020-01-29 ENCOUNTER — OFFICE VISIT (OUTPATIENT)
Dept: FAMILY MEDICINE CLINIC | Facility: CLINIC | Age: 60
End: 2020-01-29
Payer: COMMERCIAL

## 2020-01-29 VITALS
RESPIRATION RATE: 17 BRPM | DIASTOLIC BLOOD PRESSURE: 70 MMHG | SYSTOLIC BLOOD PRESSURE: 134 MMHG | HEIGHT: 64 IN | OXYGEN SATURATION: 99 % | WEIGHT: 178.8 LBS | HEART RATE: 112 BPM | BODY MASS INDEX: 30.52 KG/M2 | TEMPERATURE: 98.1 F

## 2020-01-29 DIAGNOSIS — IMO0002 UNCONTROLLED TYPE 2 DIABETES WITH NEUROPATHY: ICD-10-CM

## 2020-01-29 DIAGNOSIS — E78.2 MIXED HYPERLIPIDEMIA: ICD-10-CM

## 2020-01-29 DIAGNOSIS — R10.32 ACUTE LEFT LOWER QUADRANT PAIN: Primary | ICD-10-CM

## 2020-01-29 DIAGNOSIS — J31.0 RHINITIS MEDICAMENTOSA: ICD-10-CM

## 2020-01-29 DIAGNOSIS — I10 BENIGN ESSENTIAL HYPERTENSION: ICD-10-CM

## 2020-01-29 DIAGNOSIS — Z13.29 SCREENING FOR THYROID DISORDER: ICD-10-CM

## 2020-01-29 DIAGNOSIS — T48.5X5A RHINITIS MEDICAMENTOSA: ICD-10-CM

## 2020-01-29 PROCEDURE — 3078F DIAST BP <80 MM HG: CPT | Performed by: PHYSICIAN ASSISTANT

## 2020-01-29 PROCEDURE — 99214 OFFICE O/P EST MOD 30 MIN: CPT | Performed by: PHYSICIAN ASSISTANT

## 2020-01-29 PROCEDURE — 3008F BODY MASS INDEX DOCD: CPT | Performed by: PHYSICIAN ASSISTANT

## 2020-01-29 PROCEDURE — 3075F SYST BP GE 130 - 139MM HG: CPT | Performed by: PHYSICIAN ASSISTANT

## 2020-01-29 RX ORDER — GABAPENTIN 100 MG/1
100 CAPSULE ORAL 3 TIMES DAILY
Qty: 270 CAPSULE | Refills: 0 | Status: SHIPPED | OUTPATIENT
Start: 2020-01-29 | End: 2020-04-29

## 2020-01-29 RX ORDER — GABAPENTIN 100 MG/1
CAPSULE ORAL
Qty: 270 CAPSULE | Refills: 0 | Status: SHIPPED | OUTPATIENT
Start: 2020-01-29 | End: 2020-01-29 | Stop reason: SDUPTHER

## 2020-01-29 NOTE — PROGRESS NOTES
Assessment/Plan:    1  Acute left lower quadrant pain  Discussed possible causes  Possible diverticulitis or colitis  Get CT scan done as soon as possible  Stat renal function prior to CT with contrast   Ensure adequate hydration due to diarrhea  Brat diet  ER precaution  Will call with results  Follow up pending results  - CT abdomen pelvis w contrast; Future  - Comprehensive metabolic panel; Future    2  Uncontrolled type 2 diabetes with neuropathy ([address] )  Uncontrolled diabetic  Last A1c 11 7  Due for repeat  She recently stopped her metformin  Due to abdominal issues, will hold for now, but get CT done as soon as possible so we can begin treatment and restart diabetes management  Patient is also on Lantus 30 units daily  She is did stop this as well  Strongly encouraged patient to check her sugar at least once daily if not 3-4 times  Patient is at risk for hyperglycemia and hypoglycemic events  Encouraged adequate hydration  Encouraged healthy well-balanced diet  ER precaution   - HEMOGLOBIN A1C W/ EAG ESTIMATION; Future  - CBC and differential; Future    3  Benign essential hypertension  Patient is likely dehydrated  Her blood pressure is currently higher end of normal   Hold lisinopril and amlodipine until labs are drawn  4  Mixed hyperlipidemia  Patient stopped her atorvastatin  Strongly encouraged patient to restart this  Low-fat diet  She is due for repeat lipid panel   - Lipid panel; Future    5  Rhinitis medicamentosa  Likely cause of her complaints  Strongly encouraged patient to stop use of Afrin  Discussed this may be difficult  But she will get a great improvement once she stops  Recommended nasal saline and Flonase as needed  Patient declines treatment at this time  States she will continue using Afrin  6  Screening for thyroid disorder  - TSH, 3rd generation with Free T4 reflex;  Future      Patient Active Problem List   Diagnosis    Esophageal reflux    Uncontrolled type 2 diabetes with neuropathy (HCC)    Depression    Anxiety    Benign essential hypertension    Cerebral infarction ([address] )    Diabetic peripheral neuropathy (HCC)    Vitamin D deficiency    Systolic murmur    Familial combined hyperlipidemia    Arthritis       Subjective:      Patient ID: Hillary Dodson is a 63 y o  female  Patient is here complaining of abdominal pain for 1 month  She states it is in her left lower quadrant  Denies radiation  States the pain is there constantly, but sometimes is severe  She describes as a tax  States when she gets an attack, it is very sharp and severe  10/10 pain  It lasts for minutes at a time  Otherwise the pain is more tolerable, 2 to 3/10 pain  States her left severe pain is like a cramping, twisting, squeezing  States sometimes it feels like a contraction  She has also been having diarrhea for 1 month  States she has 10-20 episodes a day  States bowel movements are loose and watery  2-3 weeks ago she started having blood in every bowel movement  States it is a small amount  Denies melena  Denies blood on the toilet paper or after the bowel movements  Admits to frequent nausea  States she did vomit once last night, otherwise no vomiting  She has not tried anything for this yet  Denies any prior stomach issues  Denies indigestion or reflux  Denies changes in appetite  Denies weight gain or loss  Patient has never had a colonoscopy  The patient is also complaining of ongoing sinus issues  She is unsure of when it started  States she is always congested and has sinus pressure  Her sinuses are dry  Denies rhinorrhea or postnasal drip  Admits to intermittent fever and chills  She has not taken her temperature  States she wakes up sweaty at night  Admits to headaches  Denies earaches  Denies sore throat  Admits to dry cough  Admits to intermittent wheezing or shortness of breath  She does use Afrin daily    States she has probably use this daily for the last 35 years  States she cannot live without it  Patient does have a history of hypertension, hyperlipidemia, and diabetes  States she stopped all of her medications a month ago  States with everything going on with her abdomen, she did not want to take any more medications  She currently does not check her sugar  Her diet varies  States it is very poor  The following portions of the patient's history were reviewed and updated as appropriate: allergies, current medications, past family history, past medical history, past social history, past surgical history and problem list     Review of Systems   Constitutional: Positive for chills, fatigue and fever  Negative for activity change, appetite change and unexpected weight change  HENT: Positive for congestion, sinus pressure and sinus pain  Negative for ear discharge, ear pain, hearing loss, postnasal drip, rhinorrhea, sore throat, tinnitus and trouble swallowing  Eyes: Negative for pain and visual disturbance  Respiratory: Positive for cough, shortness of breath and wheezing  Negative for chest tightness  Cardiovascular: Negative for chest pain, palpitations and leg swelling  Gastrointestinal: Positive for abdominal distention, abdominal pain, blood in stool, diarrhea, nausea and vomiting  Negative for anal bleeding and constipation  Musculoskeletal: Positive for arthralgias (chronic) and myalgias  Skin: Negative for color change, pallor and rash  Neurological: Positive for numbness (feet, neuropathy) and headaches  Negative for dizziness, tremors, seizures, syncope, weakness and light-headedness  Hematological: Negative for adenopathy           Objective:      /70 (BP Location: Left arm, Patient Position: Sitting, Cuff Size: Large)   Pulse (!) 112   Temp 98 1 °F (36 7 °C) (Tympanic)   Resp 17   Ht 5' 4" (1 626 m)   Wt 81 1 kg (178 lb 12 8 oz)   SpO2 99%   BMI 30 69 kg/m² Physical Exam   Constitutional: She is oriented to person, place, and time  She appears well-developed and well-nourished  HENT:   Head: Normocephalic and atraumatic  Right Ear: Hearing, tympanic membrane, external ear and ear canal normal    Left Ear: Hearing, tympanic membrane, external ear and ear canal normal    Nose: Mucosal edema and rhinorrhea present  Right sinus exhibits no maxillary sinus tenderness and no frontal sinus tenderness  Left sinus exhibits no maxillary sinus tenderness and no frontal sinus tenderness  Mouth/Throat: Uvula is midline, oropharynx is clear and moist and mucous membranes are normal    Eyes: Pupils are equal, round, and reactive to light  Conjunctivae are normal    Neck: Normal range of motion  Neck supple  Cardiovascular: Normal rate, regular rhythm, S1 normal, S2 normal, normal heart sounds and intact distal pulses  Pulmonary/Chest: Effort normal and breath sounds normal    Abdominal: Soft  Normal appearance and bowel sounds are normal  She exhibits no mass  There is no hepatosplenomegaly  There is no tenderness  Musculoskeletal: Normal range of motion  Lymphadenopathy:     She has no cervical adenopathy  Neurological: She is alert and oriented to person, place, and time  Skin: Skin is warm, dry and intact  No rash noted  Psychiatric: She has a normal mood and affect  Her behavior is normal  Judgment and thought content normal    Nursing note and vitals reviewed        Current Outpatient Medications on File Prior to Visit   Medication Sig Dispense Refill    ALPRAZolam (XANAX) 0 5 mg tablet TAKE 1 TABLET (0 5 MG TOTAL) BY MOUTH 3 (THREE) TIMES A DAY AS NEEDED FOR ANXIETY 90 tablet 0    amLODIPine (NORVASC) 10 mg tablet Take 1 tablet (10 mg total) by mouth daily 90 tablet 0    aspirin 81 mg chewable tablet Chew 81 mg      atorvastatin (LIPITOR) 40 mg tablet Take 1 tablet (40 mg total) by mouth daily 90 tablet 0    insulin glargine (LANTUS SOLOSTAR) 100 units/mL injection pen Inject 30 Units under the skin daily at bedtime 5 pen 1    Insulin Pen Needle (PEN NEEDLES) 32G X 5 MM MISC Use with insulin four times daily 100 each 1    lisinopril (ZESTRIL) 40 mg tablet Take 1 tablet (40 mg total) by mouth daily 90 tablet 0    metoprolol succinate (TOPROL-XL) 100 mg 24 hr tablet Take 1 tablet (100 mg total) by mouth daily 90 tablet 0    Multiple Vitamin (MULTIVITAMIN) tablet Take 1 tablet by mouth daily       No current facility-administered medications on file prior to visit

## 2020-01-30 ENCOUNTER — TELEPHONE (OUTPATIENT)
Dept: FAMILY MEDICINE CLINIC | Facility: CLINIC | Age: 60
End: 2020-01-30

## 2020-01-31 ENCOUNTER — APPOINTMENT (EMERGENCY)
Dept: ULTRASOUND IMAGING | Facility: HOSPITAL | Age: 60
DRG: 386 | End: 2020-01-31
Payer: COMMERCIAL

## 2020-01-31 ENCOUNTER — APPOINTMENT (OUTPATIENT)
Dept: LAB | Facility: HOSPITAL | Age: 60
DRG: 386 | End: 2020-01-31
Payer: COMMERCIAL

## 2020-01-31 ENCOUNTER — HOSPITAL ENCOUNTER (OUTPATIENT)
Dept: CT IMAGING | Facility: HOSPITAL | Age: 60
Discharge: HOME | DRG: 386 | End: 2020-01-31
Payer: COMMERCIAL

## 2020-01-31 ENCOUNTER — HOSPITAL ENCOUNTER (INPATIENT)
Facility: HOSPITAL | Age: 60
LOS: 12 days | Discharge: HOME | DRG: 386 | End: 2020-02-12
Attending: EMERGENCY MEDICINE | Admitting: STUDENT IN AN ORGANIZED HEALTH CARE EDUCATION/TRAINING PROGRAM
Payer: COMMERCIAL

## 2020-01-31 DIAGNOSIS — E55.9 VITAMIN D DEFICIENCY: ICD-10-CM

## 2020-01-31 DIAGNOSIS — K52.9 COLITIS: Primary | ICD-10-CM

## 2020-01-31 DIAGNOSIS — E78.2 MIXED HYPERLIPIDEMIA: ICD-10-CM

## 2020-01-31 DIAGNOSIS — R50.9 FEVER, UNSPECIFIED FEVER CAUSE: ICD-10-CM

## 2020-01-31 DIAGNOSIS — R10.32 ACUTE LEFT LOWER QUADRANT PAIN: ICD-10-CM

## 2020-01-31 DIAGNOSIS — R19.7 BLOODY DIARRHEA: ICD-10-CM

## 2020-01-31 DIAGNOSIS — R74.01 TRANSAMINITIS: ICD-10-CM

## 2020-01-31 DIAGNOSIS — IMO0002 UNCONTROLLED TYPE 2 DIABETES WITH NEUROPATHY: ICD-10-CM

## 2020-01-31 DIAGNOSIS — R10.84 GENERALIZED ABDOMINAL PAIN: ICD-10-CM

## 2020-01-31 DIAGNOSIS — R00.0 TACHYCARDIA: ICD-10-CM

## 2020-01-31 DIAGNOSIS — R74.01 ELEVATED TRANSAMINASE LEVEL: ICD-10-CM

## 2020-01-31 DIAGNOSIS — E11.9 DIABETES MELLITUS (HCC): ICD-10-CM

## 2020-01-31 DIAGNOSIS — R11.0 NAUSEA: ICD-10-CM

## 2020-01-31 DIAGNOSIS — I10 BENIGN ESSENTIAL HYPERTENSION: ICD-10-CM

## 2020-01-31 DIAGNOSIS — K52.9 COLITIS, ACUTE: Primary | ICD-10-CM

## 2020-01-31 DIAGNOSIS — E87.6 HYPOKALEMIA: ICD-10-CM

## 2020-01-31 DIAGNOSIS — R16.0 HEPATOMEGALY: ICD-10-CM

## 2020-01-31 DIAGNOSIS — Z13.29 SCREENING FOR THYROID DISORDER: ICD-10-CM

## 2020-01-31 DIAGNOSIS — R19.7 DIARRHEA, UNSPECIFIED TYPE: ICD-10-CM

## 2020-01-31 DIAGNOSIS — D72.829 LEUKOCYTOSIS, UNSPECIFIED TYPE: ICD-10-CM

## 2020-01-31 DIAGNOSIS — N28.1 CYST OF RIGHT KIDNEY: ICD-10-CM

## 2020-01-31 PROBLEM — R10.9 ABDOMINAL PAIN: Status: ACTIVE | Noted: 2020-01-31

## 2020-01-31 LAB
25(OH)D3 SERPL-MCNC: 11.8 NG/ML (ref 30–100)
ABO GROUP BLD: NORMAL
ALBUMIN SERPL BCP-MCNC: 2.2 G/DL (ref 3.5–5)
ALBUMIN SERPL BCP-MCNC: 2.3 G/DL (ref 3.5–5)
ALP SERPL-CCNC: 230 U/L (ref 46–116)
ALP SERPL-CCNC: 233 U/L (ref 46–116)
ALT SERPL W P-5'-P-CCNC: 1881 U/L (ref 12–78)
ALT SERPL W P-5'-P-CCNC: 3378 U/L (ref 12–78)
AMPHETAMINES SERPL QL SCN: NEGATIVE
ANION GAP SERPL CALCULATED.3IONS-SCNC: 15 MMOL/L (ref 4–13)
ANION GAP SERPL CALCULATED.3IONS-SCNC: 15 MMOL/L (ref 4–13)
ANISOCYTOSIS BLD QL SMEAR: PRESENT
APAP SERPL-MCNC: <2 UG/ML (ref 10–20)
APTT PPP: 45 SECONDS (ref 23–37)
AST SERPL W P-5'-P-CCNC: 3472 U/L (ref 5–45)
AST SERPL W P-5'-P-CCNC: 774 U/L (ref 5–45)
BACTERIA UR QL AUTO: ABNORMAL /HPF
BARBITURATES UR QL: NEGATIVE
BASOPHILS # BLD MANUAL: 0 THOUSAND/UL (ref 0–0.1)
BASOPHILS # BLD MANUAL: 0 THOUSAND/UL (ref 0–0.1)
BASOPHILS NFR MAR MANUAL: 0 % (ref 0–1)
BASOPHILS NFR MAR MANUAL: 0 % (ref 0–1)
BENZODIAZ UR QL: NEGATIVE
BILIRUB SERPL-MCNC: 0.62 MG/DL (ref 0.2–1)
BILIRUB SERPL-MCNC: 0.67 MG/DL (ref 0.2–1)
BILIRUB UR QL STRIP: ABNORMAL
BLD GP AB SCN SERPL QL: NEGATIVE
BUN SERPL-MCNC: 12 MG/DL (ref 5–25)
BUN SERPL-MCNC: 12 MG/DL (ref 5–25)
CALCIUM SERPL-MCNC: 8.7 MG/DL (ref 8.3–10.1)
CALCIUM SERPL-MCNC: 8.8 MG/DL (ref 8.3–10.1)
CHLORIDE SERPL-SCNC: 93 MMOL/L (ref 100–108)
CHLORIDE SERPL-SCNC: 96 MMOL/L (ref 100–108)
CHOLEST SERPL-MCNC: 152 MG/DL (ref 50–200)
CK SERPL-CCNC: 53 U/L (ref 26–192)
CLARITY UR: CLEAR
CO2 SERPL-SCNC: 25 MMOL/L (ref 21–32)
CO2 SERPL-SCNC: 27 MMOL/L (ref 21–32)
COCAINE UR QL: NEGATIVE
COLOR UR: YELLOW
CREAT SERPL-MCNC: 0.9 MG/DL (ref 0.6–1.3)
CREAT SERPL-MCNC: 0.93 MG/DL (ref 0.6–1.3)
EOSINOPHIL # BLD MANUAL: 0.76 THOUSAND/UL (ref 0–0.4)
EOSINOPHIL # BLD MANUAL: 0.86 THOUSAND/UL (ref 0–0.4)
EOSINOPHIL NFR BLD MANUAL: 5 % (ref 0–6)
EOSINOPHIL NFR BLD MANUAL: 9 % (ref 0–6)
ERYTHROCYTE [DISTWIDTH] IN BLOOD BY AUTOMATED COUNT: 14 % (ref 11.6–15.1)
ERYTHROCYTE [DISTWIDTH] IN BLOOD BY AUTOMATED COUNT: 14.2 % (ref 11.6–15.1)
EST. AVERAGE GLUCOSE BLD GHB EST-MCNC: 217 MG/DL
ETHANOL SERPL-MCNC: <3 MG/DL (ref 0–3)
EXT PREG TEST URINE: NEGATIVE
EXT. CONTROL ED NAV: NORMAL
GFR SERPL CREATININE-BSD FRML MDRD: 68 ML/MIN/1.73SQ M
GFR SERPL CREATININE-BSD FRML MDRD: 71 ML/MIN/1.73SQ M
GLUCOSE P FAST SERPL-MCNC: 195 MG/DL (ref 65–99)
GLUCOSE SERPL-MCNC: 194 MG/DL (ref 65–140)
GLUCOSE UR STRIP-MCNC: NEGATIVE MG/DL
HBA1C MFR BLD: 9.2 % (ref 4.2–6.3)
HCT VFR BLD AUTO: 33.6 % (ref 34.8–46.1)
HCT VFR BLD AUTO: 38.1 % (ref 34.8–46.1)
HDLC SERPL-MCNC: 18 MG/DL
HGB BLD-MCNC: 10.3 G/DL (ref 11.5–15.4)
HGB BLD-MCNC: 11.4 G/DL (ref 11.5–15.4)
HGB UR QL STRIP.AUTO: ABNORMAL
HYPERCHROMIA BLD QL SMEAR: PRESENT
INR PPP: 1.37 (ref 0.84–1.19)
KETONES UR STRIP-MCNC: ABNORMAL MG/DL
LACTATE SERPL-SCNC: 1.7 MMOL/L (ref 0.5–2)
LDLC SERPL CALC-MCNC: 99 MG/DL (ref 0–100)
LEUKOCYTE ESTERASE UR QL STRIP: NEGATIVE
LG PLATELETS BLD QL SMEAR: PRESENT
LG PLATELETS BLD QL SMEAR: PRESENT
LIPASE SERPL-CCNC: 88 U/L (ref 73–393)
LYMPHOCYTES # BLD AUTO: 1.34 THOUSAND/UL (ref 0.6–4.47)
LYMPHOCYTES # BLD AUTO: 14 % (ref 14–44)
LYMPHOCYTES # BLD AUTO: 16 % (ref 14–44)
LYMPHOCYTES # BLD AUTO: 2.44 THOUSAND/UL (ref 0.6–4.47)
MAGNESIUM SERPL-MCNC: 1.7 MG/DL (ref 1.6–2.6)
MCH RBC QN AUTO: 25.5 PG (ref 26.8–34.3)
MCH RBC QN AUTO: 25.8 PG (ref 26.8–34.3)
MCHC RBC AUTO-ENTMCNC: 29.9 G/DL (ref 31.4–37.4)
MCHC RBC AUTO-ENTMCNC: 30.7 G/DL (ref 31.4–37.4)
MCV RBC AUTO: 84 FL (ref 82–98)
MCV RBC AUTO: 85 FL (ref 82–98)
METHADONE UR QL: NEGATIVE
MONOCYTES # BLD AUTO: 0.77 THOUSAND/UL (ref 0–1.22)
MONOCYTES # BLD AUTO: 1.07 THOUSAND/UL (ref 0–1.22)
MONOCYTES NFR BLD: 7 % (ref 4–12)
MONOCYTES NFR BLD: 8 % (ref 4–12)
MYELOCYTES NFR BLD MANUAL: 2 % (ref 0–1)
NEUTROPHILS # BLD MANUAL: 10.97 THOUSAND/UL (ref 1.85–7.62)
NEUTROPHILS # BLD MANUAL: 6.43 THOUSAND/UL (ref 1.85–7.62)
NEUTS BAND NFR BLD MANUAL: 5 % (ref 0–8)
NEUTS BAND NFR BLD MANUAL: 5 % (ref 0–8)
NEUTS SEG NFR BLD AUTO: 62 % (ref 43–75)
NEUTS SEG NFR BLD AUTO: 67 % (ref 43–75)
NITRITE UR QL STRIP: NEGATIVE
NON-SQ EPI CELLS URNS QL MICRO: ABNORMAL /HPF
NONHDLC SERPL-MCNC: 134 MG/DL
NRBC BLD AUTO-RTO: 0 /100 WBCS
NRBC BLD AUTO-RTO: 0 /100 WBCS
OPIATES UR QL SCN: NEGATIVE
OVALOCYTES BLD QL SMEAR: PRESENT
PCP UR QL: NEGATIVE
PH UR STRIP.AUTO: 6.5 [PH]
PLATELET # BLD AUTO: 494 THOUSANDS/UL (ref 149–390)
PLATELET # BLD AUTO: 522 THOUSANDS/UL (ref 149–390)
PLATELET BLD QL SMEAR: ABNORMAL
PLATELET BLD QL SMEAR: ABNORMAL
PMV BLD AUTO: 8 FL (ref 8.9–12.7)
PMV BLD AUTO: 8 FL (ref 8.9–12.7)
POLYCHROMASIA BLD QL SMEAR: PRESENT
POLYCHROMASIA BLD QL SMEAR: PRESENT
POTASSIUM SERPL-SCNC: 2.8 MMOL/L (ref 3.5–5.3)
POTASSIUM SERPL-SCNC: 3.1 MMOL/L (ref 3.5–5.3)
PROT SERPL-MCNC: 7.6 G/DL (ref 6.4–8.2)
PROT SERPL-MCNC: 7.6 G/DL (ref 6.4–8.2)
PROT UR STRIP-MCNC: ABNORMAL MG/DL
PROTHROMBIN TIME: 17.1 SECONDS (ref 11.6–14.5)
RBC # BLD AUTO: 4 MILLION/UL (ref 3.81–5.12)
RBC # BLD AUTO: 4.47 MILLION/UL (ref 3.81–5.12)
RBC #/AREA URNS AUTO: ABNORMAL /HPF
RH BLD: NEGATIVE
SALICYLATES SERPL-MCNC: <3 MG/DL (ref 3–20)
SODIUM SERPL-SCNC: 135 MMOL/L (ref 136–145)
SODIUM SERPL-SCNC: 136 MMOL/L (ref 136–145)
SP GR UR STRIP.AUTO: <=1.005 (ref 1–1.03)
SPECIMEN EXPIRATION DATE: NORMAL
THC UR QL: NEGATIVE
TOTAL CELLS COUNTED SPEC: 100
TOTAL CELLS COUNTED SPEC: 100
TRIGL SERPL-MCNC: 174 MG/DL
TROPONIN I SERPL-MCNC: <0.02 NG/ML
TSH SERPL DL<=0.05 MIU/L-ACNC: 1.51 UIU/ML (ref 0.36–3.74)
UROBILINOGEN UR QL STRIP.AUTO: 0.2 E.U./DL
VIT B12 SERPL-MCNC: >6000 PG/ML (ref 100–900)
WBC # BLD AUTO: 15.24 THOUSAND/UL (ref 4.31–10.16)
WBC # BLD AUTO: 9.59 THOUSAND/UL (ref 4.31–10.16)
WBC #/AREA URNS AUTO: ABNORMAL /HPF

## 2020-01-31 PROCEDURE — 96365 THER/PROPH/DIAG IV INF INIT: CPT

## 2020-01-31 PROCEDURE — 86900 BLOOD TYPING SEROLOGIC ABO: CPT | Performed by: NURSE PRACTITIONER

## 2020-01-31 PROCEDURE — 80307 DRUG TEST PRSMV CHEM ANLYZR: CPT | Performed by: NURSE PRACTITIONER

## 2020-01-31 PROCEDURE — 99285 EMERGENCY DEPT VISIT HI MDM: CPT

## 2020-01-31 PROCEDURE — 99285 EMERGENCY DEPT VISIT HI MDM: CPT | Performed by: EMERGENCY MEDICINE

## 2020-01-31 PROCEDURE — G0480 DRUG TEST DEF 1-7 CLASSES: HCPCS | Performed by: NURSE PRACTITIONER

## 2020-01-31 PROCEDURE — 96361 HYDRATE IV INFUSION ADD-ON: CPT

## 2020-01-31 PROCEDURE — 81025 URINE PREGNANCY TEST: CPT | Performed by: NURSE PRACTITIONER

## 2020-01-31 PROCEDURE — 85007 BL SMEAR W/DIFF WBC COUNT: CPT

## 2020-01-31 PROCEDURE — 76705 ECHO EXAM OF ABDOMEN: CPT

## 2020-01-31 PROCEDURE — 36415 COLL VENOUS BLD VENIPUNCTURE: CPT

## 2020-01-31 PROCEDURE — 84443 ASSAY THYROID STIM HORMONE: CPT

## 2020-01-31 PROCEDURE — 93005 ELECTROCARDIOGRAM TRACING: CPT

## 2020-01-31 PROCEDURE — 80329 ANALGESICS NON-OPIOID 1 OR 2: CPT | Performed by: NURSE PRACTITIONER

## 2020-01-31 PROCEDURE — 85027 COMPLETE CBC AUTOMATED: CPT | Performed by: NURSE PRACTITIONER

## 2020-01-31 PROCEDURE — 80053 COMPREHEN METABOLIC PANEL: CPT

## 2020-01-31 PROCEDURE — 80061 LIPID PANEL: CPT

## 2020-01-31 PROCEDURE — 80053 COMPREHEN METABOLIC PANEL: CPT | Performed by: NURSE PRACTITIONER

## 2020-01-31 PROCEDURE — 83036 HEMOGLOBIN GLYCOSYLATED A1C: CPT

## 2020-01-31 PROCEDURE — 83690 ASSAY OF LIPASE: CPT | Performed by: NURSE PRACTITIONER

## 2020-01-31 PROCEDURE — 83735 ASSAY OF MAGNESIUM: CPT | Performed by: NURSE PRACTITIONER

## 2020-01-31 PROCEDURE — 74177 CT ABD & PELVIS W/CONTRAST: CPT

## 2020-01-31 PROCEDURE — 3046F HEMOGLOBIN A1C LEVEL >9.0%: CPT | Performed by: NURSE PRACTITIONER

## 2020-01-31 PROCEDURE — 86901 BLOOD TYPING SEROLOGIC RH(D): CPT | Performed by: NURSE PRACTITIONER

## 2020-01-31 PROCEDURE — 82306 VITAMIN D 25 HYDROXY: CPT

## 2020-01-31 PROCEDURE — 82550 ASSAY OF CK (CPK): CPT | Performed by: NURSE PRACTITIONER

## 2020-01-31 PROCEDURE — 80320 DRUG SCREEN QUANTALCOHOLS: CPT | Performed by: NURSE PRACTITIONER

## 2020-01-31 PROCEDURE — 84484 ASSAY OF TROPONIN QUANT: CPT | Performed by: NURSE PRACTITIONER

## 2020-01-31 PROCEDURE — 96375 TX/PRO/DX INJ NEW DRUG ADDON: CPT

## 2020-01-31 PROCEDURE — 86850 RBC ANTIBODY SCREEN: CPT | Performed by: NURSE PRACTITIONER

## 2020-01-31 PROCEDURE — 85027 COMPLETE CBC AUTOMATED: CPT

## 2020-01-31 PROCEDURE — 80074 ACUTE HEPATITIS PANEL: CPT | Performed by: NURSE PRACTITIONER

## 2020-01-31 PROCEDURE — 81001 URINALYSIS AUTO W/SCOPE: CPT | Performed by: NURSE PRACTITIONER

## 2020-01-31 PROCEDURE — 85730 THROMBOPLASTIN TIME PARTIAL: CPT | Performed by: NURSE PRACTITIONER

## 2020-01-31 PROCEDURE — 85007 BL SMEAR W/DIFF WBC COUNT: CPT | Performed by: NURSE PRACTITIONER

## 2020-01-31 PROCEDURE — 85610 PROTHROMBIN TIME: CPT | Performed by: NURSE PRACTITIONER

## 2020-01-31 PROCEDURE — 83605 ASSAY OF LACTIC ACID: CPT | Performed by: NURSE PRACTITIONER

## 2020-01-31 PROCEDURE — 82607 VITAMIN B-12: CPT

## 2020-01-31 PROCEDURE — 99223 1ST HOSP IP/OBS HIGH 75: CPT | Performed by: INTERNAL MEDICINE

## 2020-01-31 RX ORDER — POTASSIUM CHLORIDE 14.9 MG/ML
20 INJECTION INTRAVENOUS ONCE
Status: COMPLETED | OUTPATIENT
Start: 2020-01-31 | End: 2020-01-31

## 2020-01-31 RX ORDER — SODIUM CHLORIDE AND POTASSIUM CHLORIDE .9; .15 G/100ML; G/100ML
100 SOLUTION INTRAVENOUS CONTINUOUS
Status: DISCONTINUED | OUTPATIENT
Start: 2020-01-31 | End: 2020-02-06

## 2020-01-31 RX ORDER — SODIUM CHLORIDE 9 MG/ML
100 INJECTION, SOLUTION INTRAVENOUS CONTINUOUS
Status: DISCONTINUED | OUTPATIENT
Start: 2020-01-31 | End: 2020-01-31

## 2020-01-31 RX ORDER — SODIUM CHLORIDE 9 MG/ML
125 INJECTION, SOLUTION INTRAVENOUS CONTINUOUS
Status: DISCONTINUED | OUTPATIENT
Start: 2020-01-31 | End: 2020-01-31

## 2020-01-31 RX ORDER — ONDANSETRON 2 MG/ML
4 INJECTION INTRAMUSCULAR; INTRAVENOUS ONCE
Status: COMPLETED | OUTPATIENT
Start: 2020-01-31 | End: 2020-01-31

## 2020-01-31 RX ADMIN — SODIUM CHLORIDE 125 ML/HR: 0.9 INJECTION, SOLUTION INTRAVENOUS at 19:49

## 2020-01-31 RX ADMIN — ONDANSETRON 4 MG: 2 INJECTION INTRAMUSCULAR; INTRAVENOUS at 21:57

## 2020-01-31 RX ADMIN — POTASSIUM CHLORIDE 20 MEQ: 14.9 INJECTION, SOLUTION INTRAVENOUS at 21:56

## 2020-01-31 RX ADMIN — METOPROLOL TARTRATE 25 MG: 25 TABLET ORAL at 22:00

## 2020-01-31 RX ADMIN — SODIUM CHLORIDE 1000 ML: 0.9 INJECTION, SOLUTION INTRAVENOUS at 22:00

## 2020-01-31 RX ADMIN — IOHEXOL 100 ML: 350 INJECTION, SOLUTION INTRAVENOUS at 17:20

## 2020-02-01 ENCOUNTER — TELEPHONE (OUTPATIENT)
Dept: FAMILY MEDICINE CLINIC | Facility: CLINIC | Age: 60
End: 2020-02-01

## 2020-02-01 LAB
ALBUMIN SERPL BCP-MCNC: 1.9 G/DL (ref 3.5–5)
ALP SERPL-CCNC: 205 U/L (ref 46–116)
ALT SERPL W P-5'-P-CCNC: 1306 U/L (ref 12–78)
ANION GAP SERPL CALCULATED.3IONS-SCNC: 12 MMOL/L (ref 4–13)
ANISOCYTOSIS BLD QL SMEAR: PRESENT
AST SERPL W P-5'-P-CCNC: 279 U/L (ref 5–45)
ATRIAL RATE: 116 BPM
BASOPHILS # BLD MANUAL: 0 THOUSAND/UL (ref 0–0.1)
BASOPHILS NFR MAR MANUAL: 0 % (ref 0–1)
BILIRUB SERPL-MCNC: 0.66 MG/DL (ref 0.2–1)
BUN SERPL-MCNC: 8 MG/DL (ref 5–25)
C DIFF TOX GENS STL QL NAA+PROBE: NEGATIVE
CALCIUM SERPL-MCNC: 8.1 MG/DL (ref 8.3–10.1)
CAMPYLOBACTER DNA SPEC NAA+PROBE: NORMAL
CHLORIDE SERPL-SCNC: 99 MMOL/L (ref 100–108)
CO2 SERPL-SCNC: 24 MMOL/L (ref 21–32)
CREAT SERPL-MCNC: 0.62 MG/DL (ref 0.6–1.3)
CRP SERPL QL: >90 MG/L
EOSINOPHIL # BLD MANUAL: 0.62 THOUSAND/UL (ref 0–0.4)
EOSINOPHIL NFR BLD MANUAL: 5 % (ref 0–6)
ERYTHROCYTE [DISTWIDTH] IN BLOOD BY AUTOMATED COUNT: 14.3 % (ref 11.6–15.1)
FERRITIN SERPL-MCNC: 430 NG/ML (ref 8–388)
GFR SERPL CREATININE-BSD FRML MDRD: 100 ML/MIN/1.73SQ M
GLUCOSE SERPL-MCNC: 149 MG/DL (ref 65–140)
GLUCOSE SERPL-MCNC: 150 MG/DL (ref 65–140)
GLUCOSE SERPL-MCNC: 174 MG/DL (ref 65–140)
GLUCOSE SERPL-MCNC: 196 MG/DL (ref 65–140)
GLUCOSE SERPL-MCNC: 212 MG/DL (ref 65–140)
GLUCOSE SERPL-MCNC: 258 MG/DL (ref 65–140)
HAV IGM SER QL: NORMAL
HBV CORE IGM SER QL: NORMAL
HBV SURFACE AG SER QL: NORMAL
HCT VFR BLD AUTO: 30.4 % (ref 34.8–46.1)
HCV AB SER QL: NORMAL
HGB BLD-MCNC: 9.1 G/DL (ref 11.5–15.4)
HYPERCHROMIA BLD QL SMEAR: PRESENT
IRON SATN MFR SERPL: 11 %
IRON SERPL-MCNC: 30 UG/DL (ref 50–170)
LG PLATELETS BLD QL SMEAR: PRESENT
LYMPHOCYTES # BLD AUTO: 1.99 THOUSAND/UL (ref 0.6–4.47)
LYMPHOCYTES # BLD AUTO: 16 % (ref 14–44)
MAGNESIUM SERPL-MCNC: 1.9 MG/DL (ref 1.6–2.6)
MCH RBC QN AUTO: 26.1 PG (ref 26.8–34.3)
MCHC RBC AUTO-ENTMCNC: 29.9 G/DL (ref 31.4–37.4)
MCV RBC AUTO: 87 FL (ref 82–98)
MONOCYTES # BLD AUTO: 1.12 THOUSAND/UL (ref 0–1.22)
MONOCYTES NFR BLD: 9 % (ref 4–12)
NEUTROPHILS # BLD MANUAL: 8.58 THOUSAND/UL (ref 1.85–7.62)
NEUTS BAND NFR BLD MANUAL: 8 % (ref 0–8)
NEUTS SEG NFR BLD AUTO: 61 % (ref 43–75)
NRBC BLD AUTO-RTO: 0 /100 WBCS
P AXIS: 29 DEGREES
PHOSPHATE SERPL-MCNC: 3.8 MG/DL (ref 2.7–4.5)
PLATELET # BLD AUTO: 377 THOUSANDS/UL (ref 149–390)
PLATELET BLD QL SMEAR: ADEQUATE
PMV BLD AUTO: 8.5 FL (ref 8.9–12.7)
POLYCHROMASIA BLD QL SMEAR: PRESENT
POTASSIUM SERPL-SCNC: 2.9 MMOL/L (ref 3.5–5.3)
POTASSIUM SERPL-SCNC: 3 MMOL/L (ref 3.5–5.3)
PR INTERVAL: 130 MS
PROT SERPL-MCNC: 6.4 G/DL (ref 6.4–8.2)
QRS AXIS: 44 DEGREES
QRSD INTERVAL: 84 MS
QT INTERVAL: 336 MS
QTC INTERVAL: 467 MS
RBC # BLD AUTO: 3.48 MILLION/UL (ref 3.81–5.12)
SALMONELLA DNA SPEC QL NAA+PROBE: NORMAL
SHIGA TOXIN STX GENE SPEC NAA+PROBE: NORMAL
SHIGELLA DNA SPEC QL NAA+PROBE: NORMAL
SODIUM SERPL-SCNC: 135 MMOL/L (ref 136–145)
T WAVE AXIS: 64 DEGREES
TIBC SERPL-MCNC: 265 UG/DL (ref 250–450)
TOTAL CELLS COUNTED SPEC: 100
VARIANT LYMPHS # BLD AUTO: 1 %
VENTRICULAR RATE: 116 BPM
WBC # BLD AUTO: 12.44 THOUSAND/UL (ref 4.31–10.16)

## 2020-02-01 PROCEDURE — 85007 BL SMEAR W/DIFF WBC COUNT: CPT | Performed by: INTERNAL MEDICINE

## 2020-02-01 PROCEDURE — 84132 ASSAY OF SERUM POTASSIUM: CPT | Performed by: INTERNAL MEDICINE

## 2020-02-01 PROCEDURE — 82948 REAGENT STRIP/BLOOD GLUCOSE: CPT

## 2020-02-01 PROCEDURE — 86235 NUCLEAR ANTIGEN ANTIBODY: CPT | Performed by: PHYSICIAN ASSISTANT

## 2020-02-01 PROCEDURE — 83993 ASSAY FOR CALPROTECTIN FECAL: CPT | Performed by: PHYSICIAN ASSISTANT

## 2020-02-01 PROCEDURE — 84100 ASSAY OF PHOSPHORUS: CPT | Performed by: INTERNAL MEDICINE

## 2020-02-01 PROCEDURE — 82390 ASSAY OF CERULOPLASMIN: CPT | Performed by: PHYSICIAN ASSISTANT

## 2020-02-01 PROCEDURE — 83540 ASSAY OF IRON: CPT | Performed by: PHYSICIAN ASSISTANT

## 2020-02-01 PROCEDURE — 86645 CMV ANTIBODY IGM: CPT | Performed by: INTERNAL MEDICINE

## 2020-02-01 PROCEDURE — 85027 COMPLETE CBC AUTOMATED: CPT | Performed by: INTERNAL MEDICINE

## 2020-02-01 PROCEDURE — 83550 IRON BINDING TEST: CPT | Performed by: PHYSICIAN ASSISTANT

## 2020-02-01 PROCEDURE — 87505 NFCT AGENT DETECTION GI: CPT | Performed by: NURSE PRACTITIONER

## 2020-02-01 PROCEDURE — 86140 C-REACTIVE PROTEIN: CPT | Performed by: PHYSICIAN ASSISTANT

## 2020-02-01 PROCEDURE — 82728 ASSAY OF FERRITIN: CPT | Performed by: PHYSICIAN ASSISTANT

## 2020-02-01 PROCEDURE — 87493 C DIFF AMPLIFIED PROBE: CPT | Performed by: NURSE PRACTITIONER

## 2020-02-01 PROCEDURE — 99232 SBSQ HOSP IP/OBS MODERATE 35: CPT | Performed by: HOSPITALIST

## 2020-02-01 PROCEDURE — 93010 ELECTROCARDIOGRAM REPORT: CPT | Performed by: INTERNAL MEDICINE

## 2020-02-01 PROCEDURE — 86644 CMV ANTIBODY: CPT | Performed by: INTERNAL MEDICINE

## 2020-02-01 PROCEDURE — 82103 ALPHA-1-ANTITRYPSIN TOTAL: CPT | Performed by: PHYSICIAN ASSISTANT

## 2020-02-01 PROCEDURE — 80053 COMPREHEN METABOLIC PANEL: CPT | Performed by: INTERNAL MEDICINE

## 2020-02-01 PROCEDURE — 86665 EPSTEIN-BARR CAPSID VCA: CPT | Performed by: PHYSICIAN ASSISTANT

## 2020-02-01 PROCEDURE — 87529 HSV DNA AMP PROBE: CPT | Performed by: PHYSICIAN ASSISTANT

## 2020-02-01 PROCEDURE — 86664 EPSTEIN-BARR NUCLEAR ANTIGEN: CPT | Performed by: PHYSICIAN ASSISTANT

## 2020-02-01 PROCEDURE — 99255 IP/OBS CONSLTJ NEW/EST HI 80: CPT | Performed by: INTERNAL MEDICINE

## 2020-02-01 PROCEDURE — 86038 ANTINUCLEAR ANTIBODIES: CPT | Performed by: PHYSICIAN ASSISTANT

## 2020-02-01 PROCEDURE — 86256 FLUORESCENT ANTIBODY TITER: CPT | Performed by: INTERNAL MEDICINE

## 2020-02-01 PROCEDURE — 86663 EPSTEIN-BARR ANTIBODY: CPT | Performed by: PHYSICIAN ASSISTANT

## 2020-02-01 PROCEDURE — 83735 ASSAY OF MAGNESIUM: CPT | Performed by: INTERNAL MEDICINE

## 2020-02-01 RX ORDER — ATORVASTATIN CALCIUM 40 MG/1
40 TABLET, FILM COATED ORAL
Status: DISCONTINUED | OUTPATIENT
Start: 2020-02-01 | End: 2020-02-03

## 2020-02-01 RX ORDER — ASPIRIN 81 MG/1
81 TABLET, CHEWABLE ORAL DAILY
Status: DISCONTINUED | OUTPATIENT
Start: 2020-02-01 | End: 2020-02-12 | Stop reason: HOSPADM

## 2020-02-01 RX ORDER — ALPRAZOLAM 0.5 MG/1
0.5 TABLET ORAL 3 TIMES DAILY PRN
Status: DISCONTINUED | OUTPATIENT
Start: 2020-02-01 | End: 2020-02-06

## 2020-02-01 RX ORDER — METOPROLOL SUCCINATE 50 MG/1
100 TABLET, EXTENDED RELEASE ORAL DAILY
Status: DISCONTINUED | OUTPATIENT
Start: 2020-02-01 | End: 2020-02-12 | Stop reason: HOSPADM

## 2020-02-01 RX ORDER — GUAIFENESIN 100 MG/5ML
200 SOLUTION ORAL EVERY 4 HOURS PRN
Status: DISCONTINUED | OUTPATIENT
Start: 2020-02-01 | End: 2020-02-06

## 2020-02-01 RX ORDER — LISINOPRIL 20 MG/1
40 TABLET ORAL DAILY
Status: DISCONTINUED | OUTPATIENT
Start: 2020-02-01 | End: 2020-02-12 | Stop reason: HOSPADM

## 2020-02-01 RX ORDER — GABAPENTIN 100 MG/1
100 CAPSULE ORAL 3 TIMES DAILY
Status: DISCONTINUED | OUTPATIENT
Start: 2020-02-01 | End: 2020-02-12 | Stop reason: HOSPADM

## 2020-02-01 RX ORDER — POTASSIUM CHLORIDE 14.9 MG/ML
20 INJECTION INTRAVENOUS ONCE
Status: COMPLETED | OUTPATIENT
Start: 2020-02-01 | End: 2020-02-01

## 2020-02-01 RX ORDER — AMLODIPINE BESYLATE 10 MG/1
10 TABLET ORAL DAILY
Status: DISCONTINUED | OUTPATIENT
Start: 2020-02-01 | End: 2020-02-12 | Stop reason: HOSPADM

## 2020-02-01 RX ADMIN — GUAIFENESIN 200 MG: 100 SOLUTION ORAL at 13:39

## 2020-02-01 RX ADMIN — INSULIN LISPRO 2 UNITS: 100 INJECTION, SOLUTION INTRAVENOUS; SUBCUTANEOUS at 12:19

## 2020-02-01 RX ADMIN — ATORVASTATIN CALCIUM 40 MG: 40 TABLET, FILM COATED ORAL at 16:54

## 2020-02-01 RX ADMIN — GABAPENTIN 100 MG: 100 CAPSULE ORAL at 08:23

## 2020-02-01 RX ADMIN — PIPERACILLIN SODIUM AND TAZOBACTAM SODIUM 3.38 G: 36; 4.5 INJECTION, POWDER, FOR SOLUTION INTRAVENOUS at 13:02

## 2020-02-01 RX ADMIN — AMLODIPINE BESYLATE 10 MG: 10 TABLET ORAL at 08:24

## 2020-02-01 RX ADMIN — GUAIFENESIN 200 MG: 100 SOLUTION ORAL at 17:58

## 2020-02-01 RX ADMIN — LISINOPRIL 40 MG: 20 TABLET ORAL at 21:08

## 2020-02-01 RX ADMIN — GUAIFENESIN 200 MG: 100 SOLUTION ORAL at 22:02

## 2020-02-01 RX ADMIN — ASPIRIN 81 MG 81 MG: 81 TABLET ORAL at 08:23

## 2020-02-01 RX ADMIN — LISINOPRIL 40 MG: 20 TABLET ORAL at 01:31

## 2020-02-01 RX ADMIN — METOPROLOL SUCCINATE 100 MG: 50 TABLET, EXTENDED RELEASE ORAL at 08:23

## 2020-02-01 RX ADMIN — INSULIN LISPRO 1 UNITS: 100 INJECTION, SOLUTION INTRAVENOUS; SUBCUTANEOUS at 08:23

## 2020-02-01 RX ADMIN — INSULIN LISPRO 1 UNITS: 100 INJECTION, SOLUTION INTRAVENOUS; SUBCUTANEOUS at 01:32

## 2020-02-01 RX ADMIN — INSULIN LISPRO 2 UNITS: 100 INJECTION, SOLUTION INTRAVENOUS; SUBCUTANEOUS at 16:53

## 2020-02-01 RX ADMIN — POTASSIUM CHLORIDE AND SODIUM CHLORIDE 100 ML/HR: 900; 150 INJECTION, SOLUTION INTRAVENOUS at 14:59

## 2020-02-01 RX ADMIN — PIPERACILLIN SODIUM AND TAZOBACTAM SODIUM 3.38 G: 36; 4.5 INJECTION, POWDER, FOR SOLUTION INTRAVENOUS at 07:23

## 2020-02-01 RX ADMIN — GABAPENTIN 100 MG: 100 CAPSULE ORAL at 21:08

## 2020-02-01 RX ADMIN — POTASSIUM CHLORIDE AND SODIUM CHLORIDE 100 ML/HR: 900; 150 INJECTION, SOLUTION INTRAVENOUS at 01:34

## 2020-02-01 RX ADMIN — INSULIN LISPRO 1 UNITS: 100 INJECTION, SOLUTION INTRAVENOUS; SUBCUTANEOUS at 21:09

## 2020-02-01 RX ADMIN — GABAPENTIN 100 MG: 100 CAPSULE ORAL at 16:54

## 2020-02-01 RX ADMIN — POTASSIUM CHLORIDE 20 MEQ: 14.9 INJECTION, SOLUTION INTRAVENOUS at 04:23

## 2020-02-01 RX ADMIN — PIPERACILLIN SODIUM AND TAZOBACTAM SODIUM 3.38 G: 36; 4.5 INJECTION, POWDER, FOR SOLUTION INTRAVENOUS at 17:58

## 2020-02-01 RX ADMIN — PIPERACILLIN SODIUM AND TAZOBACTAM SODIUM 3.38 G: 36; 4.5 INJECTION, POWDER, FOR SOLUTION INTRAVENOUS at 01:35

## 2020-02-01 NOTE — TELEPHONE ENCOUNTER
I called patient yesterday with her stat CT of abdomen and lab results  Discussed significantly elevated liver transaminases as well as other abnormal labs  Discussed colitis on CT scan  Strongly advised patient to go to ED for further evaluation and management  She was agreeable  ED referral placed

## 2020-02-02 LAB
A1AT SERPL-MCNC: 341 MG/DL (ref 101–187)
ALBUMIN SERPL BCP-MCNC: 1.7 G/DL (ref 3.5–5)
ALP SERPL-CCNC: 199 U/L (ref 46–116)
ALT SERPL W P-5'-P-CCNC: 655 U/L (ref 12–78)
ANION GAP SERPL CALCULATED.3IONS-SCNC: 11 MMOL/L (ref 4–13)
AST SERPL W P-5'-P-CCNC: 73 U/L (ref 5–45)
BASOPHILS # BLD AUTO: 0.08 THOUSANDS/ΜL (ref 0–0.1)
BASOPHILS NFR BLD AUTO: 1 % (ref 0–1)
BILIRUB DIRECT SERPL-MCNC: 0.28 MG/DL (ref 0–0.2)
BILIRUB SERPL-MCNC: 0.53 MG/DL (ref 0.2–1)
BUN SERPL-MCNC: 3 MG/DL (ref 5–25)
CALCIUM SERPL-MCNC: 7.6 MG/DL (ref 8.3–10.1)
CERULOPLASMIN SERPL-MCNC: 34.5 MG/DL (ref 19–39)
CHLORIDE SERPL-SCNC: 105 MMOL/L (ref 100–108)
CO2 SERPL-SCNC: 24 MMOL/L (ref 21–32)
CREAT SERPL-MCNC: 0.6 MG/DL (ref 0.6–1.3)
EOSINOPHIL # BLD AUTO: 0.67 THOUSAND/ΜL (ref 0–0.61)
EOSINOPHIL NFR BLD AUTO: 7 % (ref 0–6)
ERYTHROCYTE [DISTWIDTH] IN BLOOD BY AUTOMATED COUNT: 14.5 % (ref 11.6–15.1)
GFR SERPL CREATININE-BSD FRML MDRD: 101 ML/MIN/1.73SQ M
GLUCOSE SERPL-MCNC: 160 MG/DL (ref 65–140)
GLUCOSE SERPL-MCNC: 170 MG/DL (ref 65–140)
GLUCOSE SERPL-MCNC: 171 MG/DL (ref 65–140)
GLUCOSE SERPL-MCNC: 208 MG/DL (ref 65–140)
GLUCOSE SERPL-MCNC: 294 MG/DL (ref 65–140)
HCT VFR BLD AUTO: 28.8 % (ref 34.8–46.1)
HGB BLD-MCNC: 8.8 G/DL (ref 11.5–15.4)
IMM GRANULOCYTES # BLD AUTO: 0.21 THOUSAND/UL (ref 0–0.2)
IMM GRANULOCYTES NFR BLD AUTO: 2 % (ref 0–2)
INR PPP: 1.33 (ref 0.84–1.19)
LYMPHOCYTES # BLD AUTO: 1.41 THOUSANDS/ΜL (ref 0.6–4.47)
LYMPHOCYTES NFR BLD AUTO: 14 % (ref 14–44)
MCH RBC QN AUTO: 26.3 PG (ref 26.8–34.3)
MCHC RBC AUTO-ENTMCNC: 30.6 G/DL (ref 31.4–37.4)
MCV RBC AUTO: 86 FL (ref 82–98)
MONOCYTES # BLD AUTO: 1.14 THOUSAND/ΜL (ref 0.17–1.22)
MONOCYTES NFR BLD AUTO: 11 % (ref 4–12)
NEUTROPHILS # BLD AUTO: 6.62 THOUSANDS/ΜL (ref 1.85–7.62)
NEUTS SEG NFR BLD AUTO: 65 % (ref 43–75)
NRBC BLD AUTO-RTO: 0 /100 WBCS
PLATELET # BLD AUTO: 347 THOUSANDS/UL (ref 149–390)
PMV BLD AUTO: 8.2 FL (ref 8.9–12.7)
POTASSIUM SERPL-SCNC: 2.9 MMOL/L (ref 3.5–5.3)
PROT SERPL-MCNC: 5.9 G/DL (ref 6.4–8.2)
PROTHROMBIN TIME: 16.7 SECONDS (ref 11.6–14.5)
RBC # BLD AUTO: 3.35 MILLION/UL (ref 3.81–5.12)
SODIUM SERPL-SCNC: 140 MMOL/L (ref 136–145)
WBC # BLD AUTO: 10.13 THOUSAND/UL (ref 4.31–10.16)

## 2020-02-02 PROCEDURE — 80076 HEPATIC FUNCTION PANEL: CPT | Performed by: PHYSICIAN ASSISTANT

## 2020-02-02 PROCEDURE — 99232 SBSQ HOSP IP/OBS MODERATE 35: CPT | Performed by: HOSPITALIST

## 2020-02-02 PROCEDURE — 82948 REAGENT STRIP/BLOOD GLUCOSE: CPT

## 2020-02-02 PROCEDURE — 85610 PROTHROMBIN TIME: CPT | Performed by: PHYSICIAN ASSISTANT

## 2020-02-02 PROCEDURE — 85025 COMPLETE CBC W/AUTO DIFF WBC: CPT | Performed by: HOSPITALIST

## 2020-02-02 PROCEDURE — 99232 SBSQ HOSP IP/OBS MODERATE 35: CPT | Performed by: INTERNAL MEDICINE

## 2020-02-02 PROCEDURE — 80048 BASIC METABOLIC PNL TOTAL CA: CPT | Performed by: HOSPITALIST

## 2020-02-02 RX ORDER — HYDRALAZINE HYDROCHLORIDE 20 MG/ML
10 INJECTION INTRAMUSCULAR; INTRAVENOUS EVERY 6 HOURS PRN
Status: DISCONTINUED | OUTPATIENT
Start: 2020-02-02 | End: 2020-02-12 | Stop reason: HOSPADM

## 2020-02-02 RX ADMIN — GUAIFENESIN 200 MG: 100 SOLUTION ORAL at 02:31

## 2020-02-02 RX ADMIN — INSULIN LISPRO 1 UNITS: 100 INJECTION, SOLUTION INTRAVENOUS; SUBCUTANEOUS at 21:17

## 2020-02-02 RX ADMIN — GUAIFENESIN 200 MG: 100 SOLUTION ORAL at 17:24

## 2020-02-02 RX ADMIN — PIPERACILLIN SODIUM AND TAZOBACTAM SODIUM 3.38 G: 36; 4.5 INJECTION, POWDER, FOR SOLUTION INTRAVENOUS at 12:49

## 2020-02-02 RX ADMIN — INSULIN LISPRO 1 UNITS: 100 INJECTION, SOLUTION INTRAVENOUS; SUBCUTANEOUS at 17:24

## 2020-02-02 RX ADMIN — LISINOPRIL 40 MG: 20 TABLET ORAL at 15:27

## 2020-02-02 RX ADMIN — HYDRALAZINE HYDROCHLORIDE 10 MG: 20 INJECTION INTRAMUSCULAR; INTRAVENOUS at 19:19

## 2020-02-02 RX ADMIN — GABAPENTIN 100 MG: 100 CAPSULE ORAL at 15:28

## 2020-02-02 RX ADMIN — GABAPENTIN 100 MG: 100 CAPSULE ORAL at 21:17

## 2020-02-02 RX ADMIN — PIPERACILLIN SODIUM AND TAZOBACTAM SODIUM 3.38 G: 36; 4.5 INJECTION, POWDER, FOR SOLUTION INTRAVENOUS at 00:16

## 2020-02-02 RX ADMIN — GUAIFENESIN 200 MG: 100 SOLUTION ORAL at 12:48

## 2020-02-02 RX ADMIN — POTASSIUM CHLORIDE AND SODIUM CHLORIDE 100 ML/HR: 900; 150 INJECTION, SOLUTION INTRAVENOUS at 22:47

## 2020-02-02 RX ADMIN — GUAIFENESIN 200 MG: 100 SOLUTION ORAL at 08:12

## 2020-02-02 RX ADMIN — GABAPENTIN 100 MG: 100 CAPSULE ORAL at 08:12

## 2020-02-02 RX ADMIN — INSULIN LISPRO 3 UNITS: 100 INJECTION, SOLUTION INTRAVENOUS; SUBCUTANEOUS at 12:49

## 2020-02-02 RX ADMIN — ATORVASTATIN CALCIUM 40 MG: 40 TABLET, FILM COATED ORAL at 17:24

## 2020-02-02 RX ADMIN — POTASSIUM CHLORIDE AND SODIUM CHLORIDE 100 ML/HR: 900; 150 INJECTION, SOLUTION INTRAVENOUS at 04:18

## 2020-02-02 RX ADMIN — METOPROLOL TARTRATE 25 MG: 25 TABLET ORAL at 17:52

## 2020-02-02 RX ADMIN — PIPERACILLIN SODIUM AND TAZOBACTAM SODIUM 3.38 G: 36; 4.5 INJECTION, POWDER, FOR SOLUTION INTRAVENOUS at 05:39

## 2020-02-02 RX ADMIN — PIPERACILLIN SODIUM AND TAZOBACTAM SODIUM 3.38 G: 36; 4.5 INJECTION, POWDER, FOR SOLUTION INTRAVENOUS at 17:24

## 2020-02-02 RX ADMIN — AMLODIPINE BESYLATE 10 MG: 10 TABLET ORAL at 08:12

## 2020-02-02 RX ADMIN — INSULIN LISPRO 1 UNITS: 100 INJECTION, SOLUTION INTRAVENOUS; SUBCUTANEOUS at 08:16

## 2020-02-02 RX ADMIN — METOPROLOL SUCCINATE 100 MG: 50 TABLET, EXTENDED RELEASE ORAL at 08:12

## 2020-02-02 RX ADMIN — GUAIFENESIN 200 MG: 100 SOLUTION ORAL at 22:47

## 2020-02-02 RX ADMIN — PIPERACILLIN SODIUM AND TAZOBACTAM SODIUM 3.38 G: 36; 4.5 INJECTION, POWDER, FOR SOLUTION INTRAVENOUS at 22:47

## 2020-02-02 RX ADMIN — ASPIRIN 81 MG 81 MG: 81 TABLET ORAL at 08:12

## 2020-02-02 RX ADMIN — POLYETHYLENE GLYCOL 3350, SODIUM SULFATE ANHYDROUS, SODIUM BICARBONATE, SODIUM CHLORIDE, POTASSIUM CHLORIDE 2000 ML: 236; 22.74; 6.74; 5.86; 2.97 POWDER, FOR SOLUTION ORAL at 18:19

## 2020-02-03 ENCOUNTER — ANESTHESIA EVENT (INPATIENT)
Dept: GASTROENTEROLOGY | Facility: HOSPITAL | Age: 60
DRG: 386 | End: 2020-02-03
Payer: COMMERCIAL

## 2020-02-03 ENCOUNTER — ANESTHESIA (INPATIENT)
Dept: GASTROENTEROLOGY | Facility: HOSPITAL | Age: 60
DRG: 386 | End: 2020-02-03
Payer: COMMERCIAL

## 2020-02-03 ENCOUNTER — APPOINTMENT (INPATIENT)
Dept: GASTROENTEROLOGY | Facility: HOSPITAL | Age: 60
DRG: 386 | End: 2020-02-03
Payer: COMMERCIAL

## 2020-02-03 PROBLEM — K52.9 ACUTE COLITIS: Status: ACTIVE | Noted: 2020-01-31

## 2020-02-03 PROBLEM — E44.0 MODERATE PROTEIN-CALORIE MALNUTRITION (HCC): Status: ACTIVE | Noted: 2020-02-03

## 2020-02-03 LAB
ACTIN IGG SERPL-ACNC: 3 UNITS (ref 0–19)
CMV IGG SERPL IA-ACNC: <0.6 U/ML (ref 0–0.59)
CMV IGM SERPL IA-ACNC: <30 AU/ML (ref 0–29.9)
EBV NA IGG SER IA-ACNC: 371 U/ML (ref 0–17.9)
EBV VCA IGG SER IA-ACNC: 240 U/ML (ref 0–17.9)
EBV VCA IGM SER IA-ACNC: <36 U/ML (ref 0–35.9)
GLUCOSE SERPL-MCNC: 126 MG/DL (ref 65–140)
GLUCOSE SERPL-MCNC: 128 MG/DL (ref 65–140)
GLUCOSE SERPL-MCNC: 139 MG/DL (ref 65–140)
GLUCOSE SERPL-MCNC: 174 MG/DL (ref 65–140)
INTERPRETATION: ABNORMAL
MITOCHONDRIA M2 IGG SER-ACNC: <20 UNITS (ref 0–20)
RYE IGE QN: NEGATIVE

## 2020-02-03 PROCEDURE — 88305 TISSUE EXAM BY PATHOLOGIST: CPT | Performed by: PATHOLOGY

## 2020-02-03 PROCEDURE — 45380 COLONOSCOPY AND BIOPSY: CPT | Performed by: INTERNAL MEDICINE

## 2020-02-03 PROCEDURE — 82948 REAGENT STRIP/BLOOD GLUCOSE: CPT

## 2020-02-03 PROCEDURE — 0DBP8ZX EXCISION OF RECTUM, VIA NATURAL OR ARTIFICIAL OPENING ENDOSCOPIC, DIAGNOSTIC: ICD-10-PCS | Performed by: INTERNAL MEDICINE

## 2020-02-03 PROCEDURE — 99232 SBSQ HOSP IP/OBS MODERATE 35: CPT | Performed by: INTERNAL MEDICINE

## 2020-02-03 PROCEDURE — 0DBL8ZX EXCISION OF TRANSVERSE COLON, VIA NATURAL OR ARTIFICIAL OPENING ENDOSCOPIC, DIAGNOSTIC: ICD-10-PCS | Performed by: INTERNAL MEDICINE

## 2020-02-03 RX ORDER — LIDOCAINE HYDROCHLORIDE 10 MG/ML
INJECTION, SOLUTION EPIDURAL; INFILTRATION; INTRACAUDAL; PERINEURAL AS NEEDED
Status: DISCONTINUED | OUTPATIENT
Start: 2020-02-03 | End: 2020-02-03 | Stop reason: SURG

## 2020-02-03 RX ORDER — POTASSIUM CHLORIDE 20 MEQ/1
40 TABLET, EXTENDED RELEASE ORAL DAILY
Status: DISCONTINUED | OUTPATIENT
Start: 2020-02-04 | End: 2020-02-04

## 2020-02-03 RX ORDER — PROPOFOL 10 MG/ML
INJECTION, EMULSION INTRAVENOUS AS NEEDED
Status: DISCONTINUED | OUTPATIENT
Start: 2020-02-03 | End: 2020-02-03 | Stop reason: SURG

## 2020-02-03 RX ADMIN — PIPERACILLIN SODIUM AND TAZOBACTAM SODIUM 3.38 G: 36; 4.5 INJECTION, POWDER, FOR SOLUTION INTRAVENOUS at 11:38

## 2020-02-03 RX ADMIN — DEXTRAN 70 AND HYPROMELLOSE 2910 1 DROP: 1; 3 SOLUTION/ DROPS OPHTHALMIC at 17:30

## 2020-02-03 RX ADMIN — GABAPENTIN 100 MG: 100 CAPSULE ORAL at 22:14

## 2020-02-03 RX ADMIN — LIDOCAINE HYDROCHLORIDE 40 MG: 10 INJECTION, SOLUTION EPIDURAL; INFILTRATION; INTRACAUDAL; PERINEURAL at 14:02

## 2020-02-03 RX ADMIN — LISINOPRIL 40 MG: 20 TABLET ORAL at 08:07

## 2020-02-03 RX ADMIN — GUAIFENESIN 200 MG: 100 SOLUTION ORAL at 12:57

## 2020-02-03 RX ADMIN — GUAIFENESIN 200 MG: 100 SOLUTION ORAL at 17:30

## 2020-02-03 RX ADMIN — PIPERACILLIN SODIUM AND TAZOBACTAM SODIUM 3.38 G: 36; 4.5 INJECTION, POWDER, FOR SOLUTION INTRAVENOUS at 05:07

## 2020-02-03 RX ADMIN — GABAPENTIN 100 MG: 100 CAPSULE ORAL at 08:06

## 2020-02-03 RX ADMIN — AMLODIPINE BESYLATE 10 MG: 10 TABLET ORAL at 08:07

## 2020-02-03 RX ADMIN — PROPOFOL 40 MG: 10 INJECTION, EMULSION INTRAVENOUS at 14:05

## 2020-02-03 RX ADMIN — PROPOFOL 80 MG: 10 INJECTION, EMULSION INTRAVENOUS at 14:02

## 2020-02-03 RX ADMIN — ASPIRIN 81 MG 81 MG: 81 TABLET ORAL at 08:06

## 2020-02-03 RX ADMIN — PROPOFOL 40 MG: 10 INJECTION, EMULSION INTRAVENOUS at 14:09

## 2020-02-03 RX ADMIN — GUAIFENESIN 200 MG: 100 SOLUTION ORAL at 08:06

## 2020-02-03 RX ADMIN — METOPROLOL SUCCINATE 100 MG: 50 TABLET, EXTENDED RELEASE ORAL at 08:07

## 2020-02-03 RX ADMIN — GABAPENTIN 100 MG: 100 CAPSULE ORAL at 16:32

## 2020-02-03 RX ADMIN — ATORVASTATIN CALCIUM 40 MG: 40 TABLET, FILM COATED ORAL at 16:32

## 2020-02-03 RX ADMIN — INSULIN LISPRO 1 UNITS: 100 INJECTION, SOLUTION INTRAVENOUS; SUBCUTANEOUS at 22:14

## 2020-02-03 RX ADMIN — PIPERACILLIN SODIUM AND TAZOBACTAM SODIUM 3.38 G: 36; 4.5 INJECTION, POWDER, FOR SOLUTION INTRAVENOUS at 23:55

## 2020-02-03 RX ADMIN — PROPOFOL 40 MG: 10 INJECTION, EMULSION INTRAVENOUS at 14:12

## 2020-02-03 RX ADMIN — POTASSIUM CHLORIDE AND SODIUM CHLORIDE 100 ML/HR: 900; 150 INJECTION, SOLUTION INTRAVENOUS at 11:36

## 2020-02-03 RX ADMIN — PIPERACILLIN SODIUM AND TAZOBACTAM SODIUM 3.38 G: 36; 4.5 INJECTION, POWDER, FOR SOLUTION INTRAVENOUS at 17:31

## 2020-02-03 NOTE — ANESTHESIA POSTPROCEDURE EVALUATION
Post-Op Assessment Note    CV Status:  Stable    Pain management: adequate     Mental Status:  Alert and awake   Hydration Status:  Euvolemic   PONV Controlled:  Controlled   Airway Patency:  Patent   Post Op Vitals Reviewed: Yes      Staff: Anesthesiologist, CRNA           /75 (02/03/20 1442)    Temp      Pulse 81 (02/03/20 1442)   Resp 14 (02/03/20 1442)    SpO2 97 % (02/03/20 1442)

## 2020-02-03 NOTE — ANESTHESIA PREPROCEDURE EVALUATION
Review of Systems/Medical History  Patient summary reviewed  Chart reviewed      Cardiovascular  Hyperlipidemia, Hypertension ,    Pulmonary    Comment: Respiratory failure 2/19/18     GI/Hepatic    GERD ,        Negative  ROS        Endo/Other  Diabetes poorly controlled type 2 Insulin,      GYN       Hematology  Negative hematology ROS      Musculoskeletal    Arthritis     Neurology    CVA ,    Psychology   Anxiety, Depression ,              Physical Exam    Airway    Mallampati score: II  TM Distance: >3 FB  Neck ROM: full     Dental       Cardiovascular  Rhythm: regular, Rate: normal,     Pulmonary      Other Findings        Anesthesia Plan  ASA Score- 3     Anesthesia Type- IV sedation with anesthesia with ASA Monitors  Additional Monitors:   Airway Plan:         Plan Factors-    Induction- intravenous  Postoperative Plan-     Informed Consent- Anesthetic plan and risks discussed with patient

## 2020-02-04 PROBLEM — R05.9 COUGH: Status: ACTIVE | Noted: 2020-02-04

## 2020-02-04 PROBLEM — E87.6 HYPOKALEMIA: Status: ACTIVE | Noted: 2020-02-04

## 2020-02-04 LAB
ALBUMIN SERPL BCP-MCNC: 1.7 G/DL (ref 3.5–5)
ALP SERPL-CCNC: 174 U/L (ref 46–116)
ALT SERPL W P-5'-P-CCNC: 269 U/L (ref 12–78)
ANION GAP SERPL CALCULATED.3IONS-SCNC: 9 MMOL/L (ref 4–13)
ANION GAP SERPL CALCULATED.3IONS-SCNC: 9 MMOL/L (ref 4–13)
AST SERPL W P-5'-P-CCNC: 25 U/L (ref 5–45)
BASOPHILS # BLD AUTO: 0.04 THOUSANDS/ΜL (ref 0–0.1)
BASOPHILS NFR BLD AUTO: 0 % (ref 0–1)
BILIRUB SERPL-MCNC: 0.58 MG/DL (ref 0.2–1)
BUN SERPL-MCNC: 2 MG/DL (ref 5–25)
BUN SERPL-MCNC: 3 MG/DL (ref 5–25)
CALCIUM SERPL-MCNC: 7.6 MG/DL (ref 8.3–10.1)
CALCIUM SERPL-MCNC: 7.9 MG/DL (ref 8.3–10.1)
CALPROTECTIN STL-MCNT: 2902 UG/G (ref 0–120)
CHLORIDE SERPL-SCNC: 104 MMOL/L (ref 100–108)
CHLORIDE SERPL-SCNC: 104 MMOL/L (ref 100–108)
CO2 SERPL-SCNC: 29 MMOL/L (ref 21–32)
CO2 SERPL-SCNC: 29 MMOL/L (ref 21–32)
CREAT SERPL-MCNC: 0.56 MG/DL (ref 0.6–1.3)
CREAT SERPL-MCNC: 0.77 MG/DL (ref 0.6–1.3)
EOSINOPHIL # BLD AUTO: 0.42 THOUSAND/ΜL (ref 0–0.61)
EOSINOPHIL NFR BLD AUTO: 4 % (ref 0–6)
ERYTHROCYTE [DISTWIDTH] IN BLOOD BY AUTOMATED COUNT: 14.5 % (ref 11.6–15.1)
GFR SERPL CREATININE-BSD FRML MDRD: 103 ML/MIN/1.73SQ M
GFR SERPL CREATININE-BSD FRML MDRD: 85 ML/MIN/1.73SQ M
GLUCOSE SERPL-MCNC: 101 MG/DL (ref 65–140)
GLUCOSE SERPL-MCNC: 187 MG/DL (ref 65–140)
GLUCOSE SERPL-MCNC: 200 MG/DL (ref 65–140)
GLUCOSE SERPL-MCNC: 213 MG/DL (ref 65–140)
GLUCOSE SERPL-MCNC: 247 MG/DL (ref 65–140)
GLUCOSE SERPL-MCNC: 87 MG/DL (ref 65–140)
HCT VFR BLD AUTO: 29.5 % (ref 34.8–46.1)
HGB BLD-MCNC: 8.7 G/DL (ref 11.5–15.4)
HSV1 DNA SPEC QL NAA+PROBE: NEGATIVE
HSV2 DNA SPEC QL NAA+PROBE: NEGATIVE
IMM GRANULOCYTES # BLD AUTO: 0.2 THOUSAND/UL (ref 0–0.2)
IMM GRANULOCYTES NFR BLD AUTO: 2 % (ref 0–2)
LYMPHOCYTES # BLD AUTO: 1.75 THOUSANDS/ΜL (ref 0.6–4.47)
LYMPHOCYTES NFR BLD AUTO: 15 % (ref 14–44)
MCH RBC QN AUTO: 25.5 PG (ref 26.8–34.3)
MCHC RBC AUTO-ENTMCNC: 29.5 G/DL (ref 31.4–37.4)
MCV RBC AUTO: 87 FL (ref 82–98)
MONOCYTES # BLD AUTO: 1.22 THOUSAND/ΜL (ref 0.17–1.22)
MONOCYTES NFR BLD AUTO: 10 % (ref 4–12)
NEUTROPHILS # BLD AUTO: 8.31 THOUSANDS/ΜL (ref 1.85–7.62)
NEUTS SEG NFR BLD AUTO: 69 % (ref 43–75)
NRBC BLD AUTO-RTO: 0 /100 WBCS
PLATELET # BLD AUTO: 387 THOUSANDS/UL (ref 149–390)
PMV BLD AUTO: 8.3 FL (ref 8.9–12.7)
POTASSIUM SERPL-SCNC: 2.3 MMOL/L (ref 3.5–5.3)
POTASSIUM SERPL-SCNC: 3.2 MMOL/L (ref 3.5–5.3)
PROT SERPL-MCNC: 5.9 G/DL (ref 6.4–8.2)
RBC # BLD AUTO: 3.41 MILLION/UL (ref 3.81–5.12)
SODIUM SERPL-SCNC: 142 MMOL/L (ref 136–145)
SODIUM SERPL-SCNC: 142 MMOL/L (ref 136–145)
WBC # BLD AUTO: 11.94 THOUSAND/UL (ref 4.31–10.16)

## 2020-02-04 PROCEDURE — 80048 BASIC METABOLIC PNL TOTAL CA: CPT | Performed by: INTERNAL MEDICINE

## 2020-02-04 PROCEDURE — 82948 REAGENT STRIP/BLOOD GLUCOSE: CPT

## 2020-02-04 PROCEDURE — 85025 COMPLETE CBC W/AUTO DIFF WBC: CPT | Performed by: INTERNAL MEDICINE

## 2020-02-04 PROCEDURE — 99232 SBSQ HOSP IP/OBS MODERATE 35: CPT | Performed by: INTERNAL MEDICINE

## 2020-02-04 PROCEDURE — 80053 COMPREHEN METABOLIC PANEL: CPT | Performed by: INTERNAL MEDICINE

## 2020-02-04 RX ORDER — HYDROCODONE POLISTIREX AND CHLORPHENIRAMINE POLISTIREX 10; 8 MG/5ML; MG/5ML
5 SUSPENSION, EXTENDED RELEASE ORAL
Status: DISCONTINUED | OUTPATIENT
Start: 2020-02-04 | End: 2020-02-10

## 2020-02-04 RX ORDER — POTASSIUM CHLORIDE 20 MEQ/1
40 TABLET, EXTENDED RELEASE ORAL 2 TIMES DAILY
Status: DISCONTINUED | OUTPATIENT
Start: 2020-02-04 | End: 2020-02-12 | Stop reason: HOSPADM

## 2020-02-04 RX ADMIN — DEXTRAN 70 AND HYPROMELLOSE 2910 1 DROP: 1; 3 SOLUTION/ DROPS OPHTHALMIC at 08:15

## 2020-02-04 RX ADMIN — INSULIN LISPRO 2 UNITS: 100 INJECTION, SOLUTION INTRAVENOUS; SUBCUTANEOUS at 12:45

## 2020-02-04 RX ADMIN — PIPERACILLIN SODIUM AND TAZOBACTAM SODIUM 3.38 G: 36; 4.5 INJECTION, POWDER, FOR SOLUTION INTRAVENOUS at 10:30

## 2020-02-04 RX ADMIN — DEXTRAN 70 AND HYPROMELLOSE 2910 1 DROP: 1; 3 SOLUTION/ DROPS OPHTHALMIC at 00:14

## 2020-02-04 RX ADMIN — GUAIFENESIN 200 MG: 100 SOLUTION ORAL at 00:14

## 2020-02-04 RX ADMIN — METOPROLOL SUCCINATE 100 MG: 50 TABLET, EXTENDED RELEASE ORAL at 08:11

## 2020-02-04 RX ADMIN — POTASSIUM CHLORIDE 40 MEQ: 1500 TABLET, EXTENDED RELEASE ORAL at 08:12

## 2020-02-04 RX ADMIN — ALPRAZOLAM 0.5 MG: 0.5 TABLET ORAL at 00:14

## 2020-02-04 RX ADMIN — LISINOPRIL 40 MG: 20 TABLET ORAL at 08:12

## 2020-02-04 RX ADMIN — INSULIN LISPRO 1 UNITS: 100 INJECTION, SOLUTION INTRAVENOUS; SUBCUTANEOUS at 22:22

## 2020-02-04 RX ADMIN — HYDROCODONE POLISTIREX AND CHLORPHENIRAMINE POLISTIREX 5 ML: 10; 8 SUSPENSION, EXTENDED RELEASE ORAL at 22:19

## 2020-02-04 RX ADMIN — GABAPENTIN 100 MG: 100 CAPSULE ORAL at 22:19

## 2020-02-04 RX ADMIN — PIPERACILLIN SODIUM AND TAZOBACTAM SODIUM 3.38 G: 36; 4.5 INJECTION, POWDER, FOR SOLUTION INTRAVENOUS at 05:25

## 2020-02-04 RX ADMIN — POTASSIUM CHLORIDE 40 MEQ: 1500 TABLET, EXTENDED RELEASE ORAL at 17:53

## 2020-02-04 RX ADMIN — GABAPENTIN 100 MG: 100 CAPSULE ORAL at 16:00

## 2020-02-04 RX ADMIN — POTASSIUM CHLORIDE AND SODIUM CHLORIDE 100 ML/HR: 900; 150 INJECTION, SOLUTION INTRAVENOUS at 12:07

## 2020-02-04 RX ADMIN — GUAIFENESIN 200 MG: 100 SOLUTION ORAL at 08:16

## 2020-02-04 RX ADMIN — HYDRALAZINE HYDROCHLORIDE 10 MG: 20 INJECTION INTRAMUSCULAR; INTRAVENOUS at 00:27

## 2020-02-04 RX ADMIN — GUAIFENESIN 200 MG: 100 SOLUTION ORAL at 18:12

## 2020-02-04 RX ADMIN — ALPRAZOLAM 0.5 MG: 0.5 TABLET ORAL at 13:16

## 2020-02-04 RX ADMIN — PIPERACILLIN SODIUM AND TAZOBACTAM SODIUM 3.38 G: 36; 4.5 INJECTION, POWDER, FOR SOLUTION INTRAVENOUS at 17:02

## 2020-02-04 RX ADMIN — POTASSIUM CHLORIDE AND SODIUM CHLORIDE 100 ML/HR: 900; 150 INJECTION, SOLUTION INTRAVENOUS at 00:53

## 2020-02-04 RX ADMIN — DEXTRAN 70 AND HYPROMELLOSE 2910 1 DROP: 1; 3 SOLUTION/ DROPS OPHTHALMIC at 18:13

## 2020-02-04 RX ADMIN — ALPRAZOLAM 0.5 MG: 0.5 TABLET ORAL at 18:13

## 2020-02-04 RX ADMIN — ASPIRIN 81 MG 81 MG: 81 TABLET ORAL at 08:12

## 2020-02-04 RX ADMIN — INSULIN LISPRO 1 UNITS: 100 INJECTION, SOLUTION INTRAVENOUS; SUBCUTANEOUS at 17:03

## 2020-02-04 RX ADMIN — GUAIFENESIN 200 MG: 100 SOLUTION ORAL at 13:15

## 2020-02-04 RX ADMIN — AMLODIPINE BESYLATE 10 MG: 10 TABLET ORAL at 08:12

## 2020-02-04 RX ADMIN — GABAPENTIN 100 MG: 100 CAPSULE ORAL at 08:12

## 2020-02-05 LAB
ANION GAP SERPL CALCULATED.3IONS-SCNC: 8 MMOL/L (ref 4–13)
BASOPHILS # BLD AUTO: 0.06 THOUSANDS/ΜL (ref 0–0.1)
BASOPHILS NFR BLD AUTO: 0 % (ref 0–1)
BUN SERPL-MCNC: 2 MG/DL (ref 5–25)
CALCIUM SERPL-MCNC: 7.7 MG/DL (ref 8.3–10.1)
CHLORIDE SERPL-SCNC: 103 MMOL/L (ref 100–108)
CO2 SERPL-SCNC: 30 MMOL/L (ref 21–32)
CREAT SERPL-MCNC: 0.7 MG/DL (ref 0.6–1.3)
EOSINOPHIL # BLD AUTO: 0.45 THOUSAND/ΜL (ref 0–0.61)
EOSINOPHIL NFR BLD AUTO: 3 % (ref 0–6)
ERYTHROCYTE [DISTWIDTH] IN BLOOD BY AUTOMATED COUNT: 14.5 % (ref 11.6–15.1)
FLUAV RNA NPH QL NAA+PROBE: NORMAL
FLUBV RNA NPH QL NAA+PROBE: NORMAL
GFR SERPL CREATININE-BSD FRML MDRD: 96 ML/MIN/1.73SQ M
GLUCOSE SERPL-MCNC: 133 MG/DL (ref 65–140)
GLUCOSE SERPL-MCNC: 143 MG/DL (ref 65–140)
GLUCOSE SERPL-MCNC: 156 MG/DL (ref 65–140)
GLUCOSE SERPL-MCNC: 203 MG/DL (ref 65–140)
GLUCOSE SERPL-MCNC: 247 MG/DL (ref 65–140)
HCT VFR BLD AUTO: 32 % (ref 34.8–46.1)
HGB BLD-MCNC: 9.5 G/DL (ref 11.5–15.4)
IMM GRANULOCYTES # BLD AUTO: 0.18 THOUSAND/UL (ref 0–0.2)
IMM GRANULOCYTES NFR BLD AUTO: 1 % (ref 0–2)
LYMPHOCYTES # BLD AUTO: 1.74 THOUSANDS/ΜL (ref 0.6–4.47)
LYMPHOCYTES NFR BLD AUTO: 13 % (ref 14–44)
MCH RBC QN AUTO: 25.7 PG (ref 26.8–34.3)
MCHC RBC AUTO-ENTMCNC: 29.7 G/DL (ref 31.4–37.4)
MCV RBC AUTO: 87 FL (ref 82–98)
MONOCYTES # BLD AUTO: 1.43 THOUSAND/ΜL (ref 0.17–1.22)
MONOCYTES NFR BLD AUTO: 11 % (ref 4–12)
NEUTROPHILS # BLD AUTO: 9.52 THOUSANDS/ΜL (ref 1.85–7.62)
NEUTS SEG NFR BLD AUTO: 72 % (ref 43–75)
NRBC BLD AUTO-RTO: 0 /100 WBCS
PLATELET # BLD AUTO: 371 THOUSANDS/UL (ref 149–390)
PMV BLD AUTO: 8.1 FL (ref 8.9–12.7)
POTASSIUM SERPL-SCNC: 3 MMOL/L (ref 3.5–5.3)
RBC # BLD AUTO: 3.69 MILLION/UL (ref 3.81–5.12)
RSV RNA NPH QL NAA+PROBE: NORMAL
SODIUM SERPL-SCNC: 141 MMOL/L (ref 136–145)
WBC # BLD AUTO: 13.38 THOUSAND/UL (ref 4.31–10.16)

## 2020-02-05 PROCEDURE — 82948 REAGENT STRIP/BLOOD GLUCOSE: CPT

## 2020-02-05 PROCEDURE — 85025 COMPLETE CBC W/AUTO DIFF WBC: CPT | Performed by: INTERNAL MEDICINE

## 2020-02-05 PROCEDURE — 99232 SBSQ HOSP IP/OBS MODERATE 35: CPT | Performed by: INTERNAL MEDICINE

## 2020-02-05 PROCEDURE — 87631 RESP VIRUS 3-5 TARGETS: CPT | Performed by: INTERNAL MEDICINE

## 2020-02-05 PROCEDURE — 80048 BASIC METABOLIC PNL TOTAL CA: CPT | Performed by: INTERNAL MEDICINE

## 2020-02-05 RX ORDER — IBUPROFEN 600 MG/1
600 TABLET ORAL EVERY 6 HOURS PRN
Status: DISCONTINUED | OUTPATIENT
Start: 2020-02-05 | End: 2020-02-12 | Stop reason: HOSPADM

## 2020-02-05 RX ADMIN — PIPERACILLIN SODIUM AND TAZOBACTAM SODIUM 3.38 G: 36; 4.5 INJECTION, POWDER, FOR SOLUTION INTRAVENOUS at 05:46

## 2020-02-05 RX ADMIN — HYDROCODONE POLISTIREX AND CHLORPHENIRAMINE POLISTIREX 5 ML: 10; 8 SUSPENSION, EXTENDED RELEASE ORAL at 21:50

## 2020-02-05 RX ADMIN — POTASSIUM CHLORIDE AND SODIUM CHLORIDE 100 ML/HR: 900; 150 INJECTION, SOLUTION INTRAVENOUS at 00:42

## 2020-02-05 RX ADMIN — POTASSIUM CHLORIDE 40 MEQ: 1500 TABLET, EXTENDED RELEASE ORAL at 08:37

## 2020-02-05 RX ADMIN — DEXTRAN 70 AND HYPROMELLOSE 2910 1 DROP: 1; 3 SOLUTION/ DROPS OPHTHALMIC at 16:32

## 2020-02-05 RX ADMIN — INSULIN LISPRO 2 UNITS: 100 INJECTION, SOLUTION INTRAVENOUS; SUBCUTANEOUS at 21:51

## 2020-02-05 RX ADMIN — IBUPROFEN 600 MG: 600 TABLET ORAL at 20:05

## 2020-02-05 RX ADMIN — LISINOPRIL 40 MG: 20 TABLET ORAL at 08:37

## 2020-02-05 RX ADMIN — GABAPENTIN 100 MG: 100 CAPSULE ORAL at 16:33

## 2020-02-05 RX ADMIN — PIPERACILLIN SODIUM AND TAZOBACTAM SODIUM 3.38 G: 36; 4.5 INJECTION, POWDER, FOR SOLUTION INTRAVENOUS at 10:41

## 2020-02-05 RX ADMIN — INSULIN LISPRO 1 UNITS: 100 INJECTION, SOLUTION INTRAVENOUS; SUBCUTANEOUS at 16:32

## 2020-02-05 RX ADMIN — HYDRALAZINE HYDROCHLORIDE 10 MG: 20 INJECTION INTRAMUSCULAR; INTRAVENOUS at 00:49

## 2020-02-05 RX ADMIN — INSULIN LISPRO 1 UNITS: 100 INJECTION, SOLUTION INTRAVENOUS; SUBCUTANEOUS at 12:23

## 2020-02-05 RX ADMIN — POTASSIUM CHLORIDE AND SODIUM CHLORIDE 100 ML/HR: 900; 150 INJECTION, SOLUTION INTRAVENOUS at 12:46

## 2020-02-05 RX ADMIN — PIPERACILLIN SODIUM AND TAZOBACTAM SODIUM 3.38 G: 36; 4.5 INJECTION, POWDER, FOR SOLUTION INTRAVENOUS at 00:00

## 2020-02-05 RX ADMIN — GUAIFENESIN 200 MG: 100 SOLUTION ORAL at 16:33

## 2020-02-05 RX ADMIN — ASPIRIN 81 MG 81 MG: 81 TABLET ORAL at 08:37

## 2020-02-05 RX ADMIN — POTASSIUM CHLORIDE 40 MEQ: 1500 TABLET, EXTENDED RELEASE ORAL at 16:33

## 2020-02-05 RX ADMIN — GABAPENTIN 100 MG: 100 CAPSULE ORAL at 08:37

## 2020-02-05 RX ADMIN — METOPROLOL SUCCINATE 100 MG: 50 TABLET, EXTENDED RELEASE ORAL at 08:37

## 2020-02-05 RX ADMIN — ALPRAZOLAM 0.5 MG: 0.5 TABLET ORAL at 10:26

## 2020-02-05 RX ADMIN — DEXTRAN 70 AND HYPROMELLOSE 2910 1 DROP: 1; 3 SOLUTION/ DROPS OPHTHALMIC at 10:26

## 2020-02-05 RX ADMIN — GABAPENTIN 100 MG: 100 CAPSULE ORAL at 21:50

## 2020-02-05 RX ADMIN — GUAIFENESIN 200 MG: 100 SOLUTION ORAL at 10:26

## 2020-02-05 RX ADMIN — ALPRAZOLAM 0.5 MG: 0.5 TABLET ORAL at 16:33

## 2020-02-05 RX ADMIN — AMLODIPINE BESYLATE 10 MG: 10 TABLET ORAL at 08:37

## 2020-02-05 RX ADMIN — PIPERACILLIN SODIUM AND TAZOBACTAM SODIUM 3.38 G: 36; 4.5 INJECTION, POWDER, FOR SOLUTION INTRAVENOUS at 16:32

## 2020-02-06 ENCOUNTER — APPOINTMENT (INPATIENT)
Dept: RADIOLOGY | Facility: HOSPITAL | Age: 60
DRG: 386 | End: 2020-02-06
Payer: COMMERCIAL

## 2020-02-06 PROBLEM — D72.9 NEUTROPHILIC LEUKOCYTOSIS: Status: ACTIVE | Noted: 2020-02-06

## 2020-02-06 PROBLEM — D72.828 NEUTROPHILIC LEUKOCYTOSIS: Status: ACTIVE | Noted: 2020-02-06

## 2020-02-06 LAB
ALBUMIN SERPL BCP-MCNC: 1.9 G/DL (ref 3.5–5)
ALP SERPL-CCNC: 176 U/L (ref 46–116)
ALT SERPL W P-5'-P-CCNC: 148 U/L (ref 12–78)
ANION GAP SERPL CALCULATED.3IONS-SCNC: 6 MMOL/L (ref 4–13)
AST SERPL W P-5'-P-CCNC: 17 U/L (ref 5–45)
BASOPHILS # BLD AUTO: 0.09 THOUSANDS/ΜL (ref 0–0.1)
BASOPHILS NFR BLD AUTO: 0 % (ref 0–1)
BILIRUB SERPL-MCNC: 0.68 MG/DL (ref 0.2–1)
BUN SERPL-MCNC: 3 MG/DL (ref 5–25)
CALCIUM SERPL-MCNC: 8.6 MG/DL (ref 8.3–10.1)
CHLORIDE SERPL-SCNC: 101 MMOL/L (ref 100–108)
CO2 SERPL-SCNC: 33 MMOL/L (ref 21–32)
CREAT SERPL-MCNC: 0.61 MG/DL (ref 0.6–1.3)
EOSINOPHIL # BLD AUTO: 0.78 THOUSAND/ΜL (ref 0–0.61)
EOSINOPHIL NFR BLD AUTO: 4 % (ref 0–6)
ERYTHROCYTE [DISTWIDTH] IN BLOOD BY AUTOMATED COUNT: 14.6 % (ref 11.6–15.1)
FLUAV RNA NPH QL NAA+PROBE: NORMAL
FLUBV RNA NPH QL NAA+PROBE: NORMAL
GFR SERPL CREATININE-BSD FRML MDRD: 100 ML/MIN/1.73SQ M
GLUCOSE SERPL-MCNC: 124 MG/DL (ref 65–140)
GLUCOSE SERPL-MCNC: 162 MG/DL (ref 65–140)
GLUCOSE SERPL-MCNC: 213 MG/DL (ref 65–140)
GLUCOSE SERPL-MCNC: 277 MG/DL (ref 65–140)
GLUCOSE SERPL-MCNC: 363 MG/DL (ref 65–140)
HCT VFR BLD AUTO: 36.5 % (ref 34.8–46.1)
HGB BLD-MCNC: 10.8 G/DL (ref 11.5–15.4)
IMM GRANULOCYTES # BLD AUTO: 0.31 THOUSAND/UL (ref 0–0.2)
IMM GRANULOCYTES NFR BLD AUTO: 1 % (ref 0–2)
LYMPHOCYTES # BLD AUTO: 1.99 THOUSANDS/ΜL (ref 0.6–4.47)
LYMPHOCYTES NFR BLD AUTO: 9 % (ref 14–44)
MCH RBC QN AUTO: 25.7 PG (ref 26.8–34.3)
MCHC RBC AUTO-ENTMCNC: 29.6 G/DL (ref 31.4–37.4)
MCV RBC AUTO: 87 FL (ref 82–98)
MONOCYTES # BLD AUTO: 1.55 THOUSAND/ΜL (ref 0.17–1.22)
MONOCYTES NFR BLD AUTO: 7 % (ref 4–12)
NEUTROPHILS # BLD AUTO: 17.55 THOUSANDS/ΜL (ref 1.85–7.62)
NEUTS SEG NFR BLD AUTO: 79 % (ref 43–75)
NRBC BLD AUTO-RTO: 0 /100 WBCS
PLATELET # BLD AUTO: 453 THOUSANDS/UL (ref 149–390)
PMV BLD AUTO: 8.1 FL (ref 8.9–12.7)
POTASSIUM SERPL-SCNC: 3.3 MMOL/L (ref 3.5–5.3)
PROT SERPL-MCNC: 7.2 G/DL (ref 6.4–8.2)
RBC # BLD AUTO: 4.2 MILLION/UL (ref 3.81–5.12)
RSV RNA NPH QL NAA+PROBE: NORMAL
SODIUM SERPL-SCNC: 140 MMOL/L (ref 136–145)
WBC # BLD AUTO: 22.27 THOUSAND/UL (ref 4.31–10.16)

## 2020-02-06 PROCEDURE — 99255 IP/OBS CONSLTJ NEW/EST HI 80: CPT | Performed by: INTERNAL MEDICINE

## 2020-02-06 PROCEDURE — 80053 COMPREHEN METABOLIC PANEL: CPT | Performed by: INTERNAL MEDICINE

## 2020-02-06 PROCEDURE — 71046 X-RAY EXAM CHEST 2 VIEWS: CPT

## 2020-02-06 PROCEDURE — 87631 RESP VIRUS 3-5 TARGETS: CPT | Performed by: NURSE PRACTITIONER

## 2020-02-06 PROCEDURE — 82948 REAGENT STRIP/BLOOD GLUCOSE: CPT

## 2020-02-06 PROCEDURE — 99232 SBSQ HOSP IP/OBS MODERATE 35: CPT | Performed by: INTERNAL MEDICINE

## 2020-02-06 PROCEDURE — 87040 BLOOD CULTURE FOR BACTERIA: CPT | Performed by: NURSE PRACTITIONER

## 2020-02-06 PROCEDURE — 85025 COMPLETE CBC W/AUTO DIFF WBC: CPT | Performed by: INTERNAL MEDICINE

## 2020-02-06 RX ORDER — ACETAMINOPHEN 325 MG/1
650 TABLET ORAL EVERY 6 HOURS PRN
Status: DISCONTINUED | OUTPATIENT
Start: 2020-02-06 | End: 2020-02-12 | Stop reason: HOSPADM

## 2020-02-06 RX ORDER — ECHINACEA PURPUREA EXTRACT 125 MG
1 TABLET ORAL
Status: DISCONTINUED | OUTPATIENT
Start: 2020-02-06 | End: 2020-02-12 | Stop reason: HOSPADM

## 2020-02-06 RX ORDER — GUAIFENESIN 600 MG
600 TABLET, EXTENDED RELEASE 12 HR ORAL EVERY 12 HOURS SCHEDULED
Status: DISCONTINUED | OUTPATIENT
Start: 2020-02-06 | End: 2020-02-12 | Stop reason: HOSPADM

## 2020-02-06 RX ORDER — ALPRAZOLAM 0.5 MG/1
0.5 TABLET ORAL 4 TIMES DAILY PRN
Status: DISCONTINUED | OUTPATIENT
Start: 2020-02-06 | End: 2020-02-12 | Stop reason: HOSPADM

## 2020-02-06 RX ADMIN — ALPRAZOLAM 0.5 MG: 0.5 TABLET ORAL at 08:42

## 2020-02-06 RX ADMIN — IBUPROFEN 600 MG: 600 TABLET ORAL at 20:19

## 2020-02-06 RX ADMIN — POTASSIUM CHLORIDE AND SODIUM CHLORIDE 100 ML/HR: 900; 150 INJECTION, SOLUTION INTRAVENOUS at 01:00

## 2020-02-06 RX ADMIN — ALPRAZOLAM 0.5 MG: 0.5 TABLET ORAL at 21:26

## 2020-02-06 RX ADMIN — PIPERACILLIN SODIUM AND TAZOBACTAM SODIUM 3.38 G: 36; 4.5 INJECTION, POWDER, FOR SOLUTION INTRAVENOUS at 05:26

## 2020-02-06 RX ADMIN — LISINOPRIL 40 MG: 20 TABLET ORAL at 08:42

## 2020-02-06 RX ADMIN — GUAIFENESIN 600 MG: 600 TABLET ORAL at 21:26

## 2020-02-06 RX ADMIN — METOPROLOL SUCCINATE 100 MG: 50 TABLET, EXTENDED RELEASE ORAL at 08:42

## 2020-02-06 RX ADMIN — INSULIN LISPRO 2 UNITS: 100 INJECTION, SOLUTION INTRAVENOUS; SUBCUTANEOUS at 12:51

## 2020-02-06 RX ADMIN — ACETAMINOPHEN 650 MG: 325 TABLET ORAL at 12:49

## 2020-02-06 RX ADMIN — GABAPENTIN 100 MG: 100 CAPSULE ORAL at 21:26

## 2020-02-06 RX ADMIN — AMLODIPINE BESYLATE 10 MG: 10 TABLET ORAL at 08:42

## 2020-02-06 RX ADMIN — POTASSIUM CHLORIDE 40 MEQ: 1500 TABLET, EXTENDED RELEASE ORAL at 16:37

## 2020-02-06 RX ADMIN — PIPERACILLIN SODIUM AND TAZOBACTAM SODIUM 3.38 G: 36; 4.5 INJECTION, POWDER, FOR SOLUTION INTRAVENOUS at 00:00

## 2020-02-06 RX ADMIN — DEXTRAN 70 AND HYPROMELLOSE 2910 1 DROP: 1; 3 SOLUTION/ DROPS OPHTHALMIC at 06:31

## 2020-02-06 RX ADMIN — INSULIN LISPRO 4 UNITS: 100 INJECTION, SOLUTION INTRAVENOUS; SUBCUTANEOUS at 21:28

## 2020-02-06 RX ADMIN — GUAIFENESIN 200 MG: 100 SOLUTION ORAL at 06:38

## 2020-02-06 RX ADMIN — PIPERACILLIN SODIUM AND TAZOBACTAM SODIUM 3.38 G: 36; 4.5 INJECTION, POWDER, FOR SOLUTION INTRAVENOUS at 16:35

## 2020-02-06 RX ADMIN — DEXTRAN 70 AND HYPROMELLOSE 2910 1 DROP: 1; 3 SOLUTION/ DROPS OPHTHALMIC at 08:42

## 2020-02-06 RX ADMIN — INSULIN LISPRO 3 UNITS: 100 INJECTION, SOLUTION INTRAVENOUS; SUBCUTANEOUS at 16:37

## 2020-02-06 RX ADMIN — ALPRAZOLAM 0.5 MG: 0.5 TABLET ORAL at 16:37

## 2020-02-06 RX ADMIN — ASPIRIN 81 MG 81 MG: 81 TABLET ORAL at 08:42

## 2020-02-06 RX ADMIN — PIPERACILLIN SODIUM AND TAZOBACTAM SODIUM 3.38 G: 36; 4.5 INJECTION, POWDER, FOR SOLUTION INTRAVENOUS at 11:36

## 2020-02-06 RX ADMIN — GABAPENTIN 100 MG: 100 CAPSULE ORAL at 08:42

## 2020-02-06 RX ADMIN — DEXTRAN 70 AND HYPROMELLOSE 2910 1 DROP: 1; 3 SOLUTION/ DROPS OPHTHALMIC at 16:34

## 2020-02-06 RX ADMIN — GABAPENTIN 100 MG: 100 CAPSULE ORAL at 16:37

## 2020-02-06 RX ADMIN — POTASSIUM CHLORIDE 40 MEQ: 1500 TABLET, EXTENDED RELEASE ORAL at 08:42

## 2020-02-07 PROBLEM — E87.6 HYPOKALEMIA: Status: RESOLVED | Noted: 2020-02-04 | Resolved: 2020-02-07

## 2020-02-07 PROBLEM — R53.1 ASTHENIA DUE TO DISEASE: Status: ACTIVE | Noted: 2020-02-07

## 2020-02-07 PROBLEM — J06.9 VIRAL URI: Status: ACTIVE | Noted: 2020-02-06

## 2020-02-07 LAB
ALBUMIN SERPL BCP-MCNC: 1.4 G/DL (ref 3.5–5)
ALP SERPL-CCNC: 142 U/L (ref 46–116)
ALT SERPL W P-5'-P-CCNC: 81 U/L (ref 12–78)
ANION GAP SERPL CALCULATED.3IONS-SCNC: 8 MMOL/L (ref 4–13)
AST SERPL W P-5'-P-CCNC: 12 U/L (ref 5–45)
BASOPHILS # BLD AUTO: 0.04 THOUSANDS/ΜL (ref 0–0.1)
BASOPHILS NFR BLD AUTO: 0 % (ref 0–1)
BILIRUB SERPL-MCNC: 0.37 MG/DL (ref 0.2–1)
BUN SERPL-MCNC: 5 MG/DL (ref 5–25)
CALCIUM SERPL-MCNC: 8.4 MG/DL (ref 8.3–10.1)
CHLORIDE SERPL-SCNC: 103 MMOL/L (ref 100–108)
CO2 SERPL-SCNC: 27 MMOL/L (ref 21–32)
CREAT SERPL-MCNC: 0.63 MG/DL (ref 0.6–1.3)
EOSINOPHIL # BLD AUTO: 0.59 THOUSAND/ΜL (ref 0–0.61)
EOSINOPHIL NFR BLD AUTO: 4 % (ref 0–6)
ERYTHROCYTE [DISTWIDTH] IN BLOOD BY AUTOMATED COUNT: 14.4 % (ref 11.6–15.1)
GFR SERPL CREATININE-BSD FRML MDRD: 99 ML/MIN/1.73SQ M
GLUCOSE SERPL-MCNC: 154 MG/DL (ref 65–140)
GLUCOSE SERPL-MCNC: 183 MG/DL (ref 65–140)
GLUCOSE SERPL-MCNC: 196 MG/DL (ref 65–140)
GLUCOSE SERPL-MCNC: 322 MG/DL (ref 65–140)
GLUCOSE SERPL-MCNC: 348 MG/DL (ref 65–140)
HCT VFR BLD AUTO: 35 % (ref 34.8–46.1)
HGB BLD-MCNC: 10.3 G/DL (ref 11.5–15.4)
IMM GRANULOCYTES # BLD AUTO: 0.14 THOUSAND/UL (ref 0–0.2)
IMM GRANULOCYTES NFR BLD AUTO: 1 % (ref 0–2)
LYMPHOCYTES # BLD AUTO: 2.52 THOUSANDS/ΜL (ref 0.6–4.47)
LYMPHOCYTES NFR BLD AUTO: 16 % (ref 14–44)
MCH RBC QN AUTO: 25.8 PG (ref 26.8–34.3)
MCHC RBC AUTO-ENTMCNC: 29.4 G/DL (ref 31.4–37.4)
MCV RBC AUTO: 88 FL (ref 82–98)
MONOCYTES # BLD AUTO: 1.48 THOUSAND/ΜL (ref 0.17–1.22)
MONOCYTES NFR BLD AUTO: 10 % (ref 4–12)
NEUTROPHILS # BLD AUTO: 10.88 THOUSANDS/ΜL (ref 1.85–7.62)
NEUTS SEG NFR BLD AUTO: 69 % (ref 43–75)
NRBC BLD AUTO-RTO: 0 /100 WBCS
PLATELET # BLD AUTO: 292 THOUSANDS/UL (ref 149–390)
PMV BLD AUTO: 8.9 FL (ref 8.9–12.7)
POTASSIUM SERPL-SCNC: 3.7 MMOL/L (ref 3.5–5.3)
PROT SERPL-MCNC: 6.1 G/DL (ref 6.4–8.2)
RBC # BLD AUTO: 4 MILLION/UL (ref 3.81–5.12)
SODIUM SERPL-SCNC: 138 MMOL/L (ref 136–145)
WBC # BLD AUTO: 15.65 THOUSAND/UL (ref 4.31–10.16)

## 2020-02-07 PROCEDURE — 82948 REAGENT STRIP/BLOOD GLUCOSE: CPT

## 2020-02-07 PROCEDURE — 80053 COMPREHEN METABOLIC PANEL: CPT | Performed by: NURSE PRACTITIONER

## 2020-02-07 PROCEDURE — 99233 SBSQ HOSP IP/OBS HIGH 50: CPT | Performed by: INTERNAL MEDICINE

## 2020-02-07 PROCEDURE — 85025 COMPLETE CBC W/AUTO DIFF WBC: CPT | Performed by: NURSE PRACTITIONER

## 2020-02-07 PROCEDURE — 99232 SBSQ HOSP IP/OBS MODERATE 35: CPT | Performed by: INTERNAL MEDICINE

## 2020-02-07 PROCEDURE — 97167 OT EVAL HIGH COMPLEX 60 MIN: CPT

## 2020-02-07 RX ORDER — INSULIN GLARGINE 100 [IU]/ML
10 INJECTION, SOLUTION SUBCUTANEOUS
Status: DISCONTINUED | OUTPATIENT
Start: 2020-02-07 | End: 2020-02-09

## 2020-02-07 RX ADMIN — DEXTRAN 70 AND HYPROMELLOSE 2910 1 DROP: 1; 3 SOLUTION/ DROPS OPHTHALMIC at 13:07

## 2020-02-07 RX ADMIN — IBUPROFEN 600 MG: 600 TABLET ORAL at 16:46

## 2020-02-07 RX ADMIN — INSULIN GLARGINE 10 UNITS: 100 INJECTION, SOLUTION SUBCUTANEOUS at 22:11

## 2020-02-07 RX ADMIN — INSULIN LISPRO 3 UNITS: 100 INJECTION, SOLUTION INTRAVENOUS; SUBCUTANEOUS at 22:12

## 2020-02-07 RX ADMIN — INSULIN LISPRO 1 UNITS: 100 INJECTION, SOLUTION INTRAVENOUS; SUBCUTANEOUS at 08:17

## 2020-02-07 RX ADMIN — GABAPENTIN 100 MG: 100 CAPSULE ORAL at 08:17

## 2020-02-07 RX ADMIN — AMLODIPINE BESYLATE 10 MG: 10 TABLET ORAL at 08:17

## 2020-02-07 RX ADMIN — HYDROCODONE POLISTIREX AND CHLORPHENIRAMINE POLISTIREX 5 ML: 10; 8 SUSPENSION, EXTENDED RELEASE ORAL at 01:30

## 2020-02-07 RX ADMIN — IBUPROFEN 600 MG: 600 TABLET ORAL at 07:38

## 2020-02-07 RX ADMIN — ACETAMINOPHEN 650 MG: 325 TABLET ORAL at 13:07

## 2020-02-07 RX ADMIN — POTASSIUM CHLORIDE 40 MEQ: 1500 TABLET, EXTENDED RELEASE ORAL at 08:17

## 2020-02-07 RX ADMIN — ALPRAZOLAM 0.5 MG: 0.5 TABLET ORAL at 21:56

## 2020-02-07 RX ADMIN — GUAIFENESIN 600 MG: 600 TABLET ORAL at 08:17

## 2020-02-07 RX ADMIN — LISINOPRIL 40 MG: 20 TABLET ORAL at 08:17

## 2020-02-07 RX ADMIN — GABAPENTIN 100 MG: 100 CAPSULE ORAL at 16:40

## 2020-02-07 RX ADMIN — METOPROLOL SUCCINATE 100 MG: 50 TABLET, EXTENDED RELEASE ORAL at 08:17

## 2020-02-07 RX ADMIN — DEXTRAN 70 AND HYPROMELLOSE 2910 1 DROP: 1; 3 SOLUTION/ DROPS OPHTHALMIC at 16:39

## 2020-02-07 RX ADMIN — GABAPENTIN 100 MG: 100 CAPSULE ORAL at 21:55

## 2020-02-07 RX ADMIN — IBUPROFEN 600 MG: 600 TABLET ORAL at 22:47

## 2020-02-07 RX ADMIN — GUAIFENESIN 600 MG: 600 TABLET ORAL at 21:56

## 2020-02-07 RX ADMIN — INSULIN LISPRO 1 UNITS: 100 INJECTION, SOLUTION INTRAVENOUS; SUBCUTANEOUS at 12:33

## 2020-02-07 RX ADMIN — ASPIRIN 81 MG 81 MG: 81 TABLET ORAL at 08:17

## 2020-02-07 RX ADMIN — HYDROCODONE POLISTIREX AND CHLORPHENIRAMINE POLISTIREX 5 ML: 10; 8 SUSPENSION, EXTENDED RELEASE ORAL at 22:47

## 2020-02-07 RX ADMIN — POTASSIUM CHLORIDE 40 MEQ: 1500 TABLET, EXTENDED RELEASE ORAL at 16:40

## 2020-02-07 RX ADMIN — ACETAMINOPHEN 650 MG: 325 TABLET ORAL at 00:12

## 2020-02-07 RX ADMIN — INSULIN LISPRO 4 UNITS: 100 INJECTION, SOLUTION INTRAVENOUS; SUBCUTANEOUS at 16:39

## 2020-02-08 LAB
GLUCOSE SERPL-MCNC: 137 MG/DL (ref 65–140)
GLUCOSE SERPL-MCNC: 165 MG/DL (ref 65–140)
GLUCOSE SERPL-MCNC: 195 MG/DL (ref 65–140)
GLUCOSE SERPL-MCNC: 369 MG/DL (ref 65–140)

## 2020-02-08 PROCEDURE — 82948 REAGENT STRIP/BLOOD GLUCOSE: CPT

## 2020-02-08 PROCEDURE — 99232 SBSQ HOSP IP/OBS MODERATE 35: CPT | Performed by: INTERNAL MEDICINE

## 2020-02-08 RX ORDER — PREDNISONE 20 MG/1
40 TABLET ORAL DAILY
Status: DISCONTINUED | OUTPATIENT
Start: 2020-02-08 | End: 2020-02-09

## 2020-02-08 RX ADMIN — AMLODIPINE BESYLATE 10 MG: 10 TABLET ORAL at 08:22

## 2020-02-08 RX ADMIN — POTASSIUM CHLORIDE 40 MEQ: 1500 TABLET, EXTENDED RELEASE ORAL at 08:23

## 2020-02-08 RX ADMIN — IBUPROFEN 600 MG: 600 TABLET ORAL at 13:15

## 2020-02-08 RX ADMIN — INSULIN LISPRO 3 UNITS: 100 INJECTION, SOLUTION INTRAVENOUS; SUBCUTANEOUS at 08:22

## 2020-02-08 RX ADMIN — IBUPROFEN 600 MG: 600 TABLET ORAL at 06:44

## 2020-02-08 RX ADMIN — GABAPENTIN 100 MG: 100 CAPSULE ORAL at 20:36

## 2020-02-08 RX ADMIN — GUAIFENESIN 600 MG: 600 TABLET ORAL at 08:22

## 2020-02-08 RX ADMIN — ASPIRIN 81 MG 81 MG: 81 TABLET ORAL at 08:23

## 2020-02-08 RX ADMIN — ENOXAPARIN SODIUM 40 MG: 40 INJECTION SUBCUTANEOUS at 08:23

## 2020-02-08 RX ADMIN — HYDROCODONE POLISTIREX AND CHLORPHENIRAMINE POLISTIREX 5 ML: 10; 8 SUSPENSION, EXTENDED RELEASE ORAL at 20:37

## 2020-02-08 RX ADMIN — INSULIN LISPRO 1 UNITS: 100 INJECTION, SOLUTION INTRAVENOUS; SUBCUTANEOUS at 12:19

## 2020-02-08 RX ADMIN — INSULIN LISPRO 1 UNITS: 100 INJECTION, SOLUTION INTRAVENOUS; SUBCUTANEOUS at 17:24

## 2020-02-08 RX ADMIN — INSULIN LISPRO 4 UNITS: 100 INJECTION, SOLUTION INTRAVENOUS; SUBCUTANEOUS at 22:07

## 2020-02-08 RX ADMIN — INSULIN GLARGINE 10 UNITS: 100 INJECTION, SOLUTION SUBCUTANEOUS at 22:07

## 2020-02-08 RX ADMIN — METOPROLOL SUCCINATE 100 MG: 50 TABLET, EXTENDED RELEASE ORAL at 08:23

## 2020-02-08 RX ADMIN — GABAPENTIN 100 MG: 100 CAPSULE ORAL at 17:22

## 2020-02-08 RX ADMIN — LISINOPRIL 40 MG: 20 TABLET ORAL at 08:23

## 2020-02-08 RX ADMIN — POTASSIUM CHLORIDE 40 MEQ: 1500 TABLET, EXTENDED RELEASE ORAL at 17:22

## 2020-02-08 RX ADMIN — INSULIN LISPRO 3 UNITS: 100 INJECTION, SOLUTION INTRAVENOUS; SUBCUTANEOUS at 17:24

## 2020-02-08 RX ADMIN — GUAIFENESIN 600 MG: 600 TABLET ORAL at 20:36

## 2020-02-08 RX ADMIN — GABAPENTIN 100 MG: 100 CAPSULE ORAL at 08:23

## 2020-02-08 RX ADMIN — PREDNISONE 40 MG: 20 TABLET ORAL at 14:16

## 2020-02-08 RX ADMIN — INSULIN LISPRO 3 UNITS: 100 INJECTION, SOLUTION INTRAVENOUS; SUBCUTANEOUS at 12:20

## 2020-02-09 LAB
ALBUMIN SERPL BCP-MCNC: 1.5 G/DL (ref 3.5–5)
ALP SERPL-CCNC: 163 U/L (ref 46–116)
ALT SERPL W P-5'-P-CCNC: 51 U/L (ref 12–78)
ANION GAP SERPL CALCULATED.3IONS-SCNC: 6 MMOL/L (ref 4–13)
AST SERPL W P-5'-P-CCNC: 13 U/L (ref 5–45)
BASOPHILS # BLD AUTO: 0.02 THOUSANDS/ΜL (ref 0–0.1)
BASOPHILS NFR BLD AUTO: 0 % (ref 0–1)
BILIRUB SERPL-MCNC: 0.18 MG/DL (ref 0.2–1)
BUN SERPL-MCNC: 13 MG/DL (ref 5–25)
CALCIUM SERPL-MCNC: 8.4 MG/DL (ref 8.3–10.1)
CHLORIDE SERPL-SCNC: 100 MMOL/L (ref 100–108)
CO2 SERPL-SCNC: 30 MMOL/L (ref 21–32)
CREAT SERPL-MCNC: 0.81 MG/DL (ref 0.6–1.3)
EOSINOPHIL # BLD AUTO: 0 THOUSAND/ΜL (ref 0–0.61)
EOSINOPHIL NFR BLD AUTO: 0 % (ref 0–6)
ERYTHROCYTE [DISTWIDTH] IN BLOOD BY AUTOMATED COUNT: 14.6 % (ref 11.6–15.1)
GFR SERPL CREATININE-BSD FRML MDRD: 80 ML/MIN/1.73SQ M
GLUCOSE SERPL-MCNC: 383 MG/DL (ref 65–140)
GLUCOSE SERPL-MCNC: 413 MG/DL (ref 65–140)
GLUCOSE SERPL-MCNC: 415 MG/DL (ref 65–140)
GLUCOSE SERPL-MCNC: 467 MG/DL (ref 65–140)
GLUCOSE SERPL-MCNC: 481 MG/DL (ref 65–140)
GLUCOSE SERPL-MCNC: 498 MG/DL (ref 65–140)
GLUCOSE SERPL-MCNC: >500 MG/DL (ref 65–140)
HCT VFR BLD AUTO: 31 % (ref 34.8–46.1)
HGB BLD-MCNC: 9.1 G/DL (ref 11.5–15.4)
IMM GRANULOCYTES # BLD AUTO: 0.1 THOUSAND/UL (ref 0–0.2)
IMM GRANULOCYTES NFR BLD AUTO: 1 % (ref 0–2)
LYMPHOCYTES # BLD AUTO: 1.08 THOUSANDS/ΜL (ref 0.6–4.47)
LYMPHOCYTES NFR BLD AUTO: 8 % (ref 14–44)
MCH RBC QN AUTO: 25.6 PG (ref 26.8–34.3)
MCHC RBC AUTO-ENTMCNC: 29.4 G/DL (ref 31.4–37.4)
MCV RBC AUTO: 87 FL (ref 82–98)
MONOCYTES # BLD AUTO: 0.6 THOUSAND/ΜL (ref 0.17–1.22)
MONOCYTES NFR BLD AUTO: 5 % (ref 4–12)
NEUTROPHILS # BLD AUTO: 11.25 THOUSANDS/ΜL (ref 1.85–7.62)
NEUTS SEG NFR BLD AUTO: 86 % (ref 43–75)
NRBC BLD AUTO-RTO: 0 /100 WBCS
PLATELET # BLD AUTO: 414 THOUSANDS/UL (ref 149–390)
PMV BLD AUTO: 8.5 FL (ref 8.9–12.7)
POTASSIUM SERPL-SCNC: 5.2 MMOL/L (ref 3.5–5.3)
PROT SERPL-MCNC: 6.4 G/DL (ref 6.4–8.2)
RBC # BLD AUTO: 3.56 MILLION/UL (ref 3.81–5.12)
SODIUM SERPL-SCNC: 136 MMOL/L (ref 136–145)
WBC # BLD AUTO: 13.05 THOUSAND/UL (ref 4.31–10.16)

## 2020-02-09 PROCEDURE — 82948 REAGENT STRIP/BLOOD GLUCOSE: CPT

## 2020-02-09 PROCEDURE — 99232 SBSQ HOSP IP/OBS MODERATE 35: CPT | Performed by: INTERNAL MEDICINE

## 2020-02-09 PROCEDURE — 80053 COMPREHEN METABOLIC PANEL: CPT | Performed by: INTERNAL MEDICINE

## 2020-02-09 PROCEDURE — 85025 COMPLETE CBC W/AUTO DIFF WBC: CPT | Performed by: INTERNAL MEDICINE

## 2020-02-09 RX ORDER — INSULIN GLARGINE 100 [IU]/ML
20 INJECTION, SOLUTION SUBCUTANEOUS
Status: DISCONTINUED | OUTPATIENT
Start: 2020-02-09 | End: 2020-02-09

## 2020-02-09 RX ORDER — INSULIN GLARGINE 100 [IU]/ML
30 INJECTION, SOLUTION SUBCUTANEOUS
Status: DISCONTINUED | OUTPATIENT
Start: 2020-02-09 | End: 2020-02-09

## 2020-02-09 RX ADMIN — LISINOPRIL 40 MG: 20 TABLET ORAL at 08:33

## 2020-02-09 RX ADMIN — INSULIN LISPRO 3 UNITS: 100 INJECTION, SOLUTION INTRAVENOUS; SUBCUTANEOUS at 08:34

## 2020-02-09 RX ADMIN — ENOXAPARIN SODIUM 40 MG: 40 INJECTION SUBCUTANEOUS at 08:34

## 2020-02-09 RX ADMIN — IBUPROFEN 600 MG: 600 TABLET ORAL at 08:33

## 2020-02-09 RX ADMIN — INSULIN LISPRO 4 UNITS: 100 INJECTION, SOLUTION INTRAVENOUS; SUBCUTANEOUS at 12:05

## 2020-02-09 RX ADMIN — GABAPENTIN 100 MG: 100 CAPSULE ORAL at 20:44

## 2020-02-09 RX ADMIN — METOPROLOL SUCCINATE 100 MG: 50 TABLET, EXTENDED RELEASE ORAL at 08:33

## 2020-02-09 RX ADMIN — SODIUM CHLORIDE 10 UNITS/HR: 9 INJECTION, SOLUTION INTRAVENOUS at 19:58

## 2020-02-09 RX ADMIN — GABAPENTIN 100 MG: 100 CAPSULE ORAL at 17:32

## 2020-02-09 RX ADMIN — HYDROCODONE POLISTIREX AND CHLORPHENIRAMINE POLISTIREX 5 ML: 10; 8 SUSPENSION, EXTENDED RELEASE ORAL at 20:44

## 2020-02-09 RX ADMIN — ASPIRIN 81 MG 81 MG: 81 TABLET ORAL at 08:33

## 2020-02-09 RX ADMIN — GUAIFENESIN 600 MG: 600 TABLET ORAL at 08:33

## 2020-02-09 RX ADMIN — GUAIFENESIN 600 MG: 600 TABLET ORAL at 20:44

## 2020-02-09 RX ADMIN — AMLODIPINE BESYLATE 10 MG: 10 TABLET ORAL at 08:33

## 2020-02-09 RX ADMIN — ALPRAZOLAM 0.5 MG: 0.5 TABLET ORAL at 20:44

## 2020-02-09 RX ADMIN — INSULIN LISPRO 5 UNITS: 100 INJECTION, SOLUTION INTRAVENOUS; SUBCUTANEOUS at 08:34

## 2020-02-09 RX ADMIN — INSULIN LISPRO 5 UNITS: 100 INJECTION, SOLUTION INTRAVENOUS; SUBCUTANEOUS at 17:33

## 2020-02-09 RX ADMIN — PREDNISONE 40 MG: 20 TABLET ORAL at 08:33

## 2020-02-09 RX ADMIN — GABAPENTIN 100 MG: 100 CAPSULE ORAL at 08:33

## 2020-02-10 PROBLEM — R74.01 ELEVATED TRANSAMINASE LEVEL: Status: RESOLVED | Noted: 2020-01-31 | Resolved: 2020-02-10

## 2020-02-10 LAB
ANION GAP SERPL CALCULATED.3IONS-SCNC: 7 MMOL/L (ref 4–13)
BASOPHILS # BLD AUTO: 0.02 THOUSANDS/ΜL (ref 0–0.1)
BASOPHILS NFR BLD AUTO: 0 % (ref 0–1)
BUN SERPL-MCNC: 12 MG/DL (ref 5–25)
CALCIUM SERPL-MCNC: 8.9 MG/DL (ref 8.3–10.1)
CHLORIDE SERPL-SCNC: 101 MMOL/L (ref 100–108)
CO2 SERPL-SCNC: 31 MMOL/L (ref 21–32)
CREAT SERPL-MCNC: 0.69 MG/DL (ref 0.6–1.3)
EOSINOPHIL # BLD AUTO: 0.02 THOUSAND/ΜL (ref 0–0.61)
EOSINOPHIL NFR BLD AUTO: 0 % (ref 0–6)
ERYTHROCYTE [DISTWIDTH] IN BLOOD BY AUTOMATED COUNT: 14.6 % (ref 11.6–15.1)
GFR SERPL CREATININE-BSD FRML MDRD: 96 ML/MIN/1.73SQ M
GLUCOSE SERPL-MCNC: 114 MG/DL (ref 65–140)
GLUCOSE SERPL-MCNC: 126 MG/DL (ref 65–140)
GLUCOSE SERPL-MCNC: 127 MG/DL (ref 65–140)
GLUCOSE SERPL-MCNC: 201 MG/DL (ref 65–140)
GLUCOSE SERPL-MCNC: 220 MG/DL (ref 65–140)
GLUCOSE SERPL-MCNC: 221 MG/DL (ref 65–140)
GLUCOSE SERPL-MCNC: 283 MG/DL (ref 65–140)
GLUCOSE SERPL-MCNC: 288 MG/DL (ref 65–140)
GLUCOSE SERPL-MCNC: 308 MG/DL (ref 65–140)
GLUCOSE SERPL-MCNC: 315 MG/DL (ref 65–140)
GLUCOSE SERPL-MCNC: 323 MG/DL (ref 65–140)
GLUCOSE SERPL-MCNC: 344 MG/DL (ref 65–140)
GLUCOSE SERPL-MCNC: 98 MG/DL (ref 65–140)
HCT VFR BLD AUTO: 29.2 % (ref 34.8–46.1)
HGB BLD-MCNC: 8.5 G/DL (ref 11.5–15.4)
IMM GRANULOCYTES # BLD AUTO: 0.27 THOUSAND/UL (ref 0–0.2)
IMM GRANULOCYTES NFR BLD AUTO: 2 % (ref 0–2)
LYMPHOCYTES # BLD AUTO: 2.44 THOUSANDS/ΜL (ref 0.6–4.47)
LYMPHOCYTES NFR BLD AUTO: 15 % (ref 14–44)
MCH RBC QN AUTO: 25.1 PG (ref 26.8–34.3)
MCHC RBC AUTO-ENTMCNC: 29.1 G/DL (ref 31.4–37.4)
MCV RBC AUTO: 86 FL (ref 82–98)
MONOCYTES # BLD AUTO: 1.03 THOUSAND/ΜL (ref 0.17–1.22)
MONOCYTES NFR BLD AUTO: 6 % (ref 4–12)
NEUTROPHILS # BLD AUTO: 12.56 THOUSANDS/ΜL (ref 1.85–7.62)
NEUTS SEG NFR BLD AUTO: 77 % (ref 43–75)
NRBC BLD AUTO-RTO: 0 /100 WBCS
PLATELET # BLD AUTO: 453 THOUSANDS/UL (ref 149–390)
PMV BLD AUTO: 8.2 FL (ref 8.9–12.7)
POTASSIUM SERPL-SCNC: 4.5 MMOL/L (ref 3.5–5.3)
RBC # BLD AUTO: 3.38 MILLION/UL (ref 3.81–5.12)
SODIUM SERPL-SCNC: 139 MMOL/L (ref 136–145)
WBC # BLD AUTO: 16.34 THOUSAND/UL (ref 4.31–10.16)

## 2020-02-10 PROCEDURE — 97163 PT EVAL HIGH COMPLEX 45 MIN: CPT

## 2020-02-10 PROCEDURE — 85025 COMPLETE CBC W/AUTO DIFF WBC: CPT | Performed by: INTERNAL MEDICINE

## 2020-02-10 PROCEDURE — 82948 REAGENT STRIP/BLOOD GLUCOSE: CPT

## 2020-02-10 PROCEDURE — 80048 BASIC METABOLIC PNL TOTAL CA: CPT | Performed by: INTERNAL MEDICINE

## 2020-02-10 PROCEDURE — 99233 SBSQ HOSP IP/OBS HIGH 50: CPT | Performed by: INTERNAL MEDICINE

## 2020-02-10 PROCEDURE — 99232 SBSQ HOSP IP/OBS MODERATE 35: CPT | Performed by: STUDENT IN AN ORGANIZED HEALTH CARE EDUCATION/TRAINING PROGRAM

## 2020-02-10 RX ORDER — INSULIN GLARGINE 100 [IU]/ML
30 INJECTION, SOLUTION SUBCUTANEOUS
Status: DISCONTINUED | OUTPATIENT
Start: 2020-02-10 | End: 2020-02-12 | Stop reason: HOSPADM

## 2020-02-10 RX ORDER — BENZONATATE 100 MG/1
100 CAPSULE ORAL 3 TIMES DAILY PRN
Status: DISCONTINUED | OUTPATIENT
Start: 2020-02-10 | End: 2020-02-12 | Stop reason: HOSPADM

## 2020-02-10 RX ADMIN — GABAPENTIN 100 MG: 100 CAPSULE ORAL at 08:17

## 2020-02-10 RX ADMIN — GUAIFENESIN 600 MG: 600 TABLET ORAL at 08:16

## 2020-02-10 RX ADMIN — ENOXAPARIN SODIUM 40 MG: 40 INJECTION SUBCUTANEOUS at 08:16

## 2020-02-10 RX ADMIN — METOPROLOL SUCCINATE 100 MG: 50 TABLET, EXTENDED RELEASE ORAL at 08:16

## 2020-02-10 RX ADMIN — INSULIN GLARGINE 30 UNITS: 100 INJECTION, SOLUTION SUBCUTANEOUS at 21:57

## 2020-02-10 RX ADMIN — ASPIRIN 81 MG 81 MG: 81 TABLET ORAL at 08:16

## 2020-02-10 RX ADMIN — BENZONATATE 100 MG: 100 CAPSULE ORAL at 21:24

## 2020-02-10 RX ADMIN — POTASSIUM CHLORIDE 40 MEQ: 1500 TABLET, EXTENDED RELEASE ORAL at 08:17

## 2020-02-10 RX ADMIN — POTASSIUM CHLORIDE 40 MEQ: 1500 TABLET, EXTENDED RELEASE ORAL at 19:13

## 2020-02-10 RX ADMIN — AMLODIPINE BESYLATE 10 MG: 10 TABLET ORAL at 08:16

## 2020-02-10 RX ADMIN — GABAPENTIN 100 MG: 100 CAPSULE ORAL at 15:48

## 2020-02-10 RX ADMIN — GUAIFENESIN 600 MG: 600 TABLET ORAL at 21:18

## 2020-02-10 RX ADMIN — SODIUM CHLORIDE 7 UNITS/HR: 9 INJECTION, SOLUTION INTRAVENOUS at 14:23

## 2020-02-10 RX ADMIN — PREDNISONE 30 MG: 20 TABLET ORAL at 08:16

## 2020-02-10 RX ADMIN — LISINOPRIL 40 MG: 20 TABLET ORAL at 08:16

## 2020-02-10 RX ADMIN — GABAPENTIN 100 MG: 100 CAPSULE ORAL at 21:18

## 2020-02-11 PROBLEM — K50.119 CROHN'S DISEASE OF LARGE INTESTINE WITH COMPLICATION (HCC): Status: ACTIVE | Noted: 2020-01-31

## 2020-02-11 LAB
ANION GAP SERPL CALCULATED.3IONS-SCNC: 5 MMOL/L (ref 4–13)
ANION GAP SERPL CALCULATED.3IONS-SCNC: 8 MMOL/L (ref 4–13)
BACTERIA BLD CULT: NORMAL
BACTERIA BLD CULT: NORMAL
BASOPHILS # BLD AUTO: 0.03 THOUSANDS/ΜL (ref 0–0.1)
BASOPHILS NFR BLD AUTO: 0 % (ref 0–1)
BUN SERPL-MCNC: 12 MG/DL (ref 5–25)
BUN SERPL-MCNC: 13 MG/DL (ref 5–25)
CALCIUM SERPL-MCNC: 8.6 MG/DL (ref 8.3–10.1)
CALCIUM SERPL-MCNC: 8.9 MG/DL (ref 8.3–10.1)
CHLORIDE SERPL-SCNC: 101 MMOL/L (ref 100–108)
CHLORIDE SERPL-SCNC: 102 MMOL/L (ref 100–108)
CO2 SERPL-SCNC: 30 MMOL/L (ref 21–32)
CO2 SERPL-SCNC: 32 MMOL/L (ref 21–32)
CREAT SERPL-MCNC: 0.7 MG/DL (ref 0.6–1.3)
CREAT SERPL-MCNC: 0.71 MG/DL (ref 0.6–1.3)
EOSINOPHIL # BLD AUTO: 0.13 THOUSAND/ΜL (ref 0–0.61)
EOSINOPHIL NFR BLD AUTO: 1 % (ref 0–6)
ERYTHROCYTE [DISTWIDTH] IN BLOOD BY AUTOMATED COUNT: 15 % (ref 11.6–15.1)
GFR SERPL CREATININE-BSD FRML MDRD: 94 ML/MIN/1.73SQ M
GFR SERPL CREATININE-BSD FRML MDRD: 96 ML/MIN/1.73SQ M
GLUCOSE SERPL-MCNC: 167 MG/DL (ref 65–140)
GLUCOSE SERPL-MCNC: 170 MG/DL (ref 65–140)
GLUCOSE SERPL-MCNC: 237 MG/DL (ref 65–140)
GLUCOSE SERPL-MCNC: 245 MG/DL (ref 65–140)
GLUCOSE SERPL-MCNC: 361 MG/DL (ref 65–140)
GLUCOSE SERPL-MCNC: 398 MG/DL (ref 65–140)
HCT VFR BLD AUTO: 29.8 % (ref 34.8–46.1)
HGB BLD-MCNC: 8.9 G/DL (ref 11.5–15.4)
IMM GRANULOCYTES # BLD AUTO: 0.36 THOUSAND/UL (ref 0–0.2)
IMM GRANULOCYTES NFR BLD AUTO: 2 % (ref 0–2)
LYMPHOCYTES # BLD AUTO: 3.36 THOUSANDS/ΜL (ref 0.6–4.47)
LYMPHOCYTES NFR BLD AUTO: 23 % (ref 14–44)
MAGNESIUM SERPL-MCNC: 1.7 MG/DL (ref 1.6–2.6)
MCH RBC QN AUTO: 25.7 PG (ref 26.8–34.3)
MCHC RBC AUTO-ENTMCNC: 29.9 G/DL (ref 31.4–37.4)
MCV RBC AUTO: 86 FL (ref 82–98)
MONOCYTES # BLD AUTO: 0.77 THOUSAND/ΜL (ref 0.17–1.22)
MONOCYTES NFR BLD AUTO: 5 % (ref 4–12)
NEUTROPHILS # BLD AUTO: 10.09 THOUSANDS/ΜL (ref 1.85–7.62)
NEUTS SEG NFR BLD AUTO: 69 % (ref 43–75)
NRBC BLD AUTO-RTO: 0 /100 WBCS
PLATELET # BLD AUTO: 408 THOUSANDS/UL (ref 149–390)
PMV BLD AUTO: 8.4 FL (ref 8.9–12.7)
POTASSIUM SERPL-SCNC: 4.4 MMOL/L (ref 3.5–5.3)
POTASSIUM SERPL-SCNC: 4.5 MMOL/L (ref 3.5–5.3)
RBC # BLD AUTO: 3.46 MILLION/UL (ref 3.81–5.12)
SODIUM SERPL-SCNC: 139 MMOL/L (ref 136–145)
SODIUM SERPL-SCNC: 139 MMOL/L (ref 136–145)
WBC # BLD AUTO: 14.74 THOUSAND/UL (ref 4.31–10.16)

## 2020-02-11 PROCEDURE — 80048 BASIC METABOLIC PNL TOTAL CA: CPT | Performed by: STUDENT IN AN ORGANIZED HEALTH CARE EDUCATION/TRAINING PROGRAM

## 2020-02-11 PROCEDURE — 99232 SBSQ HOSP IP/OBS MODERATE 35: CPT | Performed by: STUDENT IN AN ORGANIZED HEALTH CARE EDUCATION/TRAINING PROGRAM

## 2020-02-11 PROCEDURE — 82948 REAGENT STRIP/BLOOD GLUCOSE: CPT

## 2020-02-11 PROCEDURE — 83735 ASSAY OF MAGNESIUM: CPT | Performed by: STUDENT IN AN ORGANIZED HEALTH CARE EDUCATION/TRAINING PROGRAM

## 2020-02-11 PROCEDURE — 99233 SBSQ HOSP IP/OBS HIGH 50: CPT | Performed by: INTERNAL MEDICINE

## 2020-02-11 PROCEDURE — 85025 COMPLETE CBC W/AUTO DIFF WBC: CPT | Performed by: STUDENT IN AN ORGANIZED HEALTH CARE EDUCATION/TRAINING PROGRAM

## 2020-02-11 RX ORDER — PREDNISONE 20 MG/1
20 TABLET ORAL DAILY
Status: DISCONTINUED | OUTPATIENT
Start: 2020-02-11 | End: 2020-02-12 | Stop reason: HOSPADM

## 2020-02-11 RX ADMIN — ASPIRIN 81 MG 81 MG: 81 TABLET ORAL at 08:14

## 2020-02-11 RX ADMIN — ENOXAPARIN SODIUM 40 MG: 40 INJECTION SUBCUTANEOUS at 08:13

## 2020-02-11 RX ADMIN — BENZONATATE 100 MG: 100 CAPSULE ORAL at 15:32

## 2020-02-11 RX ADMIN — AMLODIPINE BESYLATE 10 MG: 10 TABLET ORAL at 08:13

## 2020-02-11 RX ADMIN — GABAPENTIN 100 MG: 100 CAPSULE ORAL at 15:32

## 2020-02-11 RX ADMIN — INSULIN LISPRO 1 UNITS: 100 INJECTION, SOLUTION INTRAVENOUS; SUBCUTANEOUS at 12:31

## 2020-02-11 RX ADMIN — LISINOPRIL 40 MG: 20 TABLET ORAL at 08:13

## 2020-02-11 RX ADMIN — GABAPENTIN 100 MG: 100 CAPSULE ORAL at 21:55

## 2020-02-11 RX ADMIN — METOPROLOL SUCCINATE 100 MG: 50 TABLET, EXTENDED RELEASE ORAL at 08:13

## 2020-02-11 RX ADMIN — BENZONATATE 100 MG: 100 CAPSULE ORAL at 22:11

## 2020-02-11 RX ADMIN — INSULIN LISPRO 5 UNITS: 100 INJECTION, SOLUTION INTRAVENOUS; SUBCUTANEOUS at 17:30

## 2020-02-11 RX ADMIN — INSULIN LISPRO 5 UNITS: 100 INJECTION, SOLUTION INTRAVENOUS; SUBCUTANEOUS at 12:31

## 2020-02-11 RX ADMIN — POTASSIUM CHLORIDE 40 MEQ: 1500 TABLET, EXTENDED RELEASE ORAL at 08:13

## 2020-02-11 RX ADMIN — INSULIN LISPRO 5 UNITS: 100 INJECTION, SOLUTION INTRAVENOUS; SUBCUTANEOUS at 08:14

## 2020-02-11 RX ADMIN — INSULIN LISPRO 6 UNITS: 100 INJECTION, SOLUTION INTRAVENOUS; SUBCUTANEOUS at 17:30

## 2020-02-11 RX ADMIN — GABAPENTIN 100 MG: 100 CAPSULE ORAL at 08:13

## 2020-02-11 RX ADMIN — HYDRALAZINE HYDROCHLORIDE 10 MG: 20 INJECTION INTRAMUSCULAR; INTRAVENOUS at 23:37

## 2020-02-11 RX ADMIN — PREDNISONE 30 MG: 20 TABLET ORAL at 08:14

## 2020-02-11 RX ADMIN — BENZONATATE 100 MG: 100 CAPSULE ORAL at 07:25

## 2020-02-11 RX ADMIN — POTASSIUM CHLORIDE 40 MEQ: 1500 TABLET, EXTENDED RELEASE ORAL at 17:31

## 2020-02-11 RX ADMIN — INSULIN GLARGINE 30 UNITS: 100 INJECTION, SOLUTION SUBCUTANEOUS at 22:11

## 2020-02-11 RX ADMIN — GUAIFENESIN 600 MG: 600 TABLET ORAL at 08:13

## 2020-02-11 RX ADMIN — GUAIFENESIN 600 MG: 600 TABLET ORAL at 21:55

## 2020-02-11 RX ADMIN — INSULIN LISPRO 1 UNITS: 100 INJECTION, SOLUTION INTRAVENOUS; SUBCUTANEOUS at 08:14

## 2020-02-12 VITALS
SYSTOLIC BLOOD PRESSURE: 141 MMHG | TEMPERATURE: 97.4 F | BODY MASS INDEX: 30.3 KG/M2 | RESPIRATION RATE: 20 BRPM | HEART RATE: 71 BPM | WEIGHT: 177.47 LBS | HEIGHT: 64 IN | DIASTOLIC BLOOD PRESSURE: 70 MMHG | OXYGEN SATURATION: 98 %

## 2020-02-12 LAB
GLUCOSE SERPL-MCNC: 148 MG/DL (ref 65–140)
GLUCOSE SERPL-MCNC: 177 MG/DL (ref 65–140)
GLUCOSE SERPL-MCNC: 302 MG/DL (ref 65–140)

## 2020-02-12 PROCEDURE — 99239 HOSP IP/OBS DSCHRG MGMT >30: CPT | Performed by: STUDENT IN AN ORGANIZED HEALTH CARE EDUCATION/TRAINING PROGRAM

## 2020-02-12 PROCEDURE — 97116 GAIT TRAINING THERAPY: CPT

## 2020-02-12 PROCEDURE — 97110 THERAPEUTIC EXERCISES: CPT

## 2020-02-12 PROCEDURE — 97530 THERAPEUTIC ACTIVITIES: CPT

## 2020-02-12 PROCEDURE — 82948 REAGENT STRIP/BLOOD GLUCOSE: CPT

## 2020-02-12 RX ORDER — PREDNISONE 20 MG/1
20 TABLET ORAL DAILY
Qty: 14 TABLET | Refills: 0 | Status: SHIPPED | OUTPATIENT
Start: 2020-02-13 | End: 2020-02-27

## 2020-02-12 RX ADMIN — INSULIN LISPRO 1 UNITS: 100 INJECTION, SOLUTION INTRAVENOUS; SUBCUTANEOUS at 12:49

## 2020-02-12 RX ADMIN — GABAPENTIN 100 MG: 100 CAPSULE ORAL at 08:00

## 2020-02-12 RX ADMIN — AMLODIPINE BESYLATE 10 MG: 10 TABLET ORAL at 08:00

## 2020-02-12 RX ADMIN — PREDNISONE 20 MG: 20 TABLET ORAL at 08:00

## 2020-02-12 RX ADMIN — ASPIRIN 81 MG 81 MG: 81 TABLET ORAL at 08:00

## 2020-02-12 RX ADMIN — METOPROLOL SUCCINATE 100 MG: 50 TABLET, EXTENDED RELEASE ORAL at 08:00

## 2020-02-12 RX ADMIN — LISINOPRIL 40 MG: 20 TABLET ORAL at 08:00

## 2020-02-12 RX ADMIN — ENOXAPARIN SODIUM 40 MG: 40 INJECTION SUBCUTANEOUS at 08:00

## 2020-02-12 RX ADMIN — BENZONATATE 100 MG: 100 CAPSULE ORAL at 08:00

## 2020-02-12 RX ADMIN — POTASSIUM CHLORIDE 40 MEQ: 1500 TABLET, EXTENDED RELEASE ORAL at 08:00

## 2020-02-12 RX ADMIN — GUAIFENESIN 600 MG: 600 TABLET ORAL at 08:00

## 2020-02-13 ENCOUNTER — OFFICE VISIT (OUTPATIENT)
Dept: FAMILY MEDICINE CLINIC | Facility: CLINIC | Age: 60
End: 2020-02-13
Payer: COMMERCIAL

## 2020-02-13 ENCOUNTER — TRANSITIONAL CARE MANAGEMENT (OUTPATIENT)
Dept: FAMILY MEDICINE CLINIC | Facility: CLINIC | Age: 60
End: 2020-02-13

## 2020-02-13 VITALS
OXYGEN SATURATION: 99 % | DIASTOLIC BLOOD PRESSURE: 72 MMHG | HEART RATE: 74 BPM | HEIGHT: 63 IN | WEIGHT: 180.6 LBS | TEMPERATURE: 97.8 F | SYSTOLIC BLOOD PRESSURE: 160 MMHG | BODY MASS INDEX: 32 KG/M2

## 2020-02-13 DIAGNOSIS — IMO0002 UNCONTROLLED TYPE 2 DIABETES WITH NEUROPATHY: ICD-10-CM

## 2020-02-13 DIAGNOSIS — E78.2 MIXED HYPERLIPIDEMIA: ICD-10-CM

## 2020-02-13 DIAGNOSIS — Z13.29 SCREENING FOR THYROID DISORDER: ICD-10-CM

## 2020-02-13 DIAGNOSIS — I10 BENIGN ESSENTIAL HYPERTENSION: ICD-10-CM

## 2020-02-13 DIAGNOSIS — E44.0 MODERATE PROTEIN-CALORIE MALNUTRITION (HCC): ICD-10-CM

## 2020-02-13 DIAGNOSIS — K52.9 ACUTE COLITIS: Primary | ICD-10-CM

## 2020-02-13 DIAGNOSIS — R05.9 COUGH: ICD-10-CM

## 2020-02-13 PROBLEM — J06.9 VIRAL URI: Status: RESOLVED | Noted: 2020-02-06 | Resolved: 2020-02-13

## 2020-02-13 PROCEDURE — 1111F DSCHRG MED/CURRENT MED MERGE: CPT | Performed by: PHYSICIAN ASSISTANT

## 2020-02-13 PROCEDURE — 99495 TRANSJ CARE MGMT MOD F2F 14D: CPT | Performed by: PHYSICIAN ASSISTANT

## 2020-02-13 RX ORDER — AMLODIPINE BESYLATE 10 MG/1
10 TABLET ORAL DAILY
Qty: 90 TABLET | Refills: 1 | Status: SHIPPED | OUTPATIENT
Start: 2020-02-13 | End: 2020-08-19

## 2020-02-13 RX ORDER — GUAIFENESIN 600 MG
600 TABLET, EXTENDED RELEASE 12 HR ORAL EVERY 12 HOURS SCHEDULED
Qty: 60 TABLET | Refills: 0 | Status: SHIPPED | OUTPATIENT
Start: 2020-02-13 | End: 2020-08-04

## 2020-02-13 RX ORDER — METOPROLOL SUCCINATE 100 MG/1
100 TABLET, EXTENDED RELEASE ORAL DAILY
Qty: 90 TABLET | Refills: 1 | Status: SHIPPED | OUTPATIENT
Start: 2020-02-13 | End: 2020-09-02

## 2020-02-13 RX ORDER — BENZONATATE 100 MG/1
100 CAPSULE ORAL 3 TIMES DAILY PRN
Qty: 90 CAPSULE | Refills: 0 | Status: SHIPPED | OUTPATIENT
Start: 2020-02-13 | End: 2020-08-04

## 2020-02-13 RX ORDER — LISINOPRIL 40 MG/1
40 TABLET ORAL DAILY
Qty: 90 TABLET | Refills: 1 | Status: SHIPPED | OUTPATIENT
Start: 2020-02-13 | End: 2020-09-02

## 2020-02-13 NOTE — PROGRESS NOTES
Assessment/Plan:    1  Acute colitis  Resolving  Patient has follow-up with gastroenterology 2/17  Continue clear liquid diet  Continue prednisone daily  ER precaution  Follow up here as needed  2  Moderate protein-calorie malnutrition ([address] )  Likely secondary to acute colitis  Should improve with resolution of symptoms  3  Cough  Continue guaifenesin and Tessalon Perles daily  Recommended Flonase  Do not use Afrin again  Discussed rebound symptoms  Patient understood  - benzonatate (TESSALON PERLES) 100 mg capsule; Take 1 capsule (100 mg total) by mouth 3 (three) times a day as needed for cough  Dispense: 90 capsule; Refill: 0  - guaiFENesin (MUCINEX) 600 mg 12 hr tablet; Take 1 tablet (600 mg total) by mouth every 12 (twelve) hours  Dispense: 60 tablet; Refill: 0    4  Uncontrolled type 2 diabetes with neuropathy (HCC)  Improved, likely secondary to lack of appetite and weight loss with acute colitis  Continue Lantus 30 units daily at bedtime  Continue metformin daily  Encouraged healthy well-balanced diet, low carb  Stay active  Will recheck A1c in 3 months  - Comprehensive metabolic panel; Future  - HEMOGLOBIN A1C W/ EAG ESTIMATION; Future    5  Benign essential hypertension  Mildly elevated today  Patient is still not feeling well  Will recheck in 1 month  Continue amlodipine, lisinopril, and metoprolol  Limit sodium, alcohol, and caffeine   - amLODIPine (NORVASC) 10 mg tablet; Take 1 tablet (10 mg total) by mouth daily  Dispense: 90 tablet; Refill: 1  - lisinopril (ZESTRIL) 40 mg tablet; Take 1 tablet (40 mg total) by mouth daily  Dispense: 90 tablet; Refill: 1  - metoprolol succinate (TOPROL-XL) 100 mg 24 hr tablet; Take 1 tablet (100 mg total) by mouth daily  Dispense: 90 tablet; Refill: 1  - CBC and differential; Future    6  Mixed hyperlipidemia  Will recheck lipid panel in 3 months  Continue low-fat diet  - Lipid panel; Future    7   Screening for thyroid disorder  - TSH, 3rd generation with Free T4 reflex; Future      Patient Active Problem List   Diagnosis    Esophageal reflux    Uncontrolled type 2 diabetes with neuropathy (HCC)    Depression    Anxiety    Benign essential hypertension    Cerebral infarction ([address] )    Diabetic peripheral neuropathy (HCC)    Vitamin D deficiency    Systolic murmur    Familial combined hyperlipidemia    Arthritis    Acute colitis    Moderate protein-calorie malnutrition (HCC)    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       Subjective:      Patient ID: Luis Alberto Perdomo is a 63 y o  female  Patient is here for transition of care  She was admitted on 01/31 for acute colitis  She was seen by myself on 01/29 for ongoing diarrhea and abdominal pain  Patient delayed stat CT until 1/31  During her admission it was colitis was secondary to inflammatory bowel disease  Likely Crohn's  Patient was started on prednisone  Her symptoms improved  She was discharged on 20 mg of prednisone daily and instructed to follow-up with GI outpatient  She states her symptoms have greatly improved  Denies abdominal pain  Denies nausea or vomiting  Her appetite has greatly improved  She no longer has diarrhea  Denies constipation  Denies hematochezia or melena  Patient also has ongoing sinus issues  She was using Afrin daily for the past 32 years  States she did not have it while she was admitted  She was not discharged until 2/12  She was started on guaifenesin and Tessalon Perles  She states this has helped  She is requesting a refill      TCM Call (since 1/13/2020)     Date and time call was made  2/11/2020 10:30 AM    Hospital care reviewed  Records reviewed    Patient was hospitialized at  [facility]    Date of Admission  01/31/20    Date of discharge  02/12/20    Diagnosis  acute colitis     Disposition  Home    Were the patients medications reviewed and updated  Yes    Current Symptoms  None      TCM Call (since 1/13/2020)     Post hospital issues  None    Should patient be enrolled in anticoag monitoring? No    Scheduled for follow up? Yes    Patients specialists  Other (comment)    Other specialists names  Nadira Snow     Did you obtain your prescribed medications  Yes    Do you need help managing your prescriptions or medications  No    Is transportation to your appointment needed  No    I have advised the patient to call PCP with any new or worsening symptoms    [facility]     Living Arrangements  Alone    Are you recieving any outpatient services  No    Are you recieving home care services  No    Are you using any community resources  No    Current waiver services  No    Have you fallen in the last 12 months  No    Interperter language line needed  No            The following portions of the patient's history were reviewed and updated as appropriate: allergies, current medications, past family history, past medical history, past social history, past surgical history and problem list     Review of Systems   Constitutional: Positive for fatigue  Negative for appetite change, chills and fever  HENT: Positive for congestion, postnasal drip and rhinorrhea  Negative for ear pain, sinus pressure, sinus pain, sore throat and trouble swallowing  Respiratory: Positive for cough  Negative for chest tightness, shortness of breath and wheezing  Cardiovascular: Negative for chest pain, palpitations and leg swelling  Gastrointestinal: Negative for abdominal pain, blood in stool, constipation, diarrhea, nausea and vomiting  Musculoskeletal: Positive for arthralgias and myalgias  Skin: Negative for rash  Neurological: Negative for dizziness, light-headedness and headaches  Hematological: Negative for adenopathy           Objective:      /72 (BP Location: Left arm, Patient Position: Sitting, Cuff Size: Adult)   Pulse 74   Temp 97 8 °F (36 6 °C)   Ht 5' 3" (1 6 m)   Wt 81 9 kg (180 lb 9 6 oz)   SpO2 99%   BMI 31 99 kg/m² Physical Exam   Constitutional: She is oriented to person, place, and time  She appears well-developed and well-nourished  HENT:   Head: Normocephalic and atraumatic  Right Ear: Hearing, tympanic membrane, external ear and ear canal normal    Left Ear: Hearing, tympanic membrane, external ear and ear canal normal    Nose: Mucosal edema and rhinorrhea present  Mouth/Throat: Uvula is midline, oropharynx is clear and moist and mucous membranes are normal    Eyes: Pupils are equal, round, and reactive to light  Conjunctivae are normal    Neck: Normal range of motion  Neck supple  Cardiovascular: Normal rate, regular rhythm, S1 normal, S2 normal, normal heart sounds and intact distal pulses  Pulmonary/Chest: Effort normal and breath sounds normal    Abdominal: Soft  Normal appearance and bowel sounds are normal  She exhibits no mass  There is no hepatosplenomegaly  There is no tenderness  Musculoskeletal: Normal range of motion  Lymphadenopathy:     She has no cervical adenopathy  Neurological: She is alert and oriented to person, place, and time  Skin: Skin is warm, dry and intact  No rash noted  Psychiatric: She has a normal mood and affect  Her behavior is normal  Judgment and thought content normal    Nursing note and vitals reviewed          Current Outpatient Medications on File Prior to Visit   Medication Sig Dispense Refill    ALPRAZolam (XANAX) 0 5 mg tablet TAKE 1 TABLET (0 5 MG TOTAL) BY MOUTH 3 (THREE) TIMES A DAY AS NEEDED FOR ANXIETY 90 tablet 0    aspirin 81 mg chewable tablet Chew 81 mg      gabapentin (NEURONTIN) 100 mg capsule Take 1 capsule (100 mg total) by mouth 3 (three) times a day 270 capsule 0    insulin glargine (LANTUS SOLOSTAR) 100 units/mL injection pen Inject 30 Units under the skin daily at bedtime 27 mL 0    Insulin Pen Needle (PEN NEEDLES) 32G X 5 MM MISC Use with insulin four times daily 100 each 1    metFORMIN (GLUCOPHAGE) 500 mg tablet Take 2 tablets (1,000 mg total) by mouth 2 (two) times a day with meals 360 tablet 0    Multiple Vitamin (MULTIVITAMIN) tablet Take 1 tablet by mouth daily      predniSONE 20 mg tablet Take 1 tablet (20 mg total) by mouth daily for 14 days 14 tablet 0     No current facility-administered medications on file prior to visit

## 2020-02-17 ENCOUNTER — TELEPHONE (OUTPATIENT)
Dept: FAMILY MEDICINE CLINIC | Facility: CLINIC | Age: 60
End: 2020-02-17

## 2020-02-17 NOTE — TELEPHONE ENCOUNTER
Pharmacy LM stating patient was just in hospital where she was prescribed glucose meters, test strips and lancets but no instructions were given  Pharmacy tried reaching out to ordering provider at hospital but they arent getting in touch with them  They are wondering if we would place the order for patient because she is trying to pick supplies up

## 2020-02-18 NOTE — TELEPHONE ENCOUNTER
Pt notified with details, spoke with pharmacist and she stated insurance is not covering any brand test strips  Pharmacist states test strips will need prior authorization  Rx's were given to pt in the hospital with no specific brand       [employer]    Bin# [id number]  ID: [id number]

## 2020-02-18 NOTE — TELEPHONE ENCOUNTER
Recommend checking sugars 3 times daily for now ( morning fasting,  Just prior to  Dinner,  2 hours after dinner)  Record values    We should see her in the office later this week or early next week for follow-up

## 2020-02-19 ENCOUNTER — TELEPHONE (OUTPATIENT)
Dept: OTHER | Facility: OTHER | Age: 60
End: 2020-02-19

## 2020-02-19 ENCOUNTER — OFFICE VISIT (OUTPATIENT)
Dept: GASTROENTEROLOGY | Facility: CLINIC | Age: 60
End: 2020-02-19
Payer: COMMERCIAL

## 2020-02-19 VITALS
SYSTOLIC BLOOD PRESSURE: 144 MMHG | HEART RATE: 69 BPM | WEIGHT: 179 LBS | HEIGHT: 63 IN | DIASTOLIC BLOOD PRESSURE: 82 MMHG | BODY MASS INDEX: 31.71 KG/M2 | TEMPERATURE: 98.9 F

## 2020-02-19 DIAGNOSIS — R74.8 ELEVATED LIVER ENZYMES: ICD-10-CM

## 2020-02-19 DIAGNOSIS — K21.9 GASTROESOPHAGEAL REFLUX DISEASE, ESOPHAGITIS PRESENCE NOT SPECIFIED: ICD-10-CM

## 2020-02-19 DIAGNOSIS — K52.9 COLITIS: Primary | ICD-10-CM

## 2020-02-19 PROCEDURE — 3008F BODY MASS INDEX DOCD: CPT | Performed by: NURSE PRACTITIONER

## 2020-02-19 PROCEDURE — 3008F BODY MASS INDEX DOCD: CPT | Performed by: PHYSICIAN ASSISTANT

## 2020-02-19 PROCEDURE — 99214 OFFICE O/P EST MOD 30 MIN: CPT | Performed by: PHYSICIAN ASSISTANT

## 2020-02-19 PROCEDURE — 3077F SYST BP >= 140 MM HG: CPT | Performed by: PHYSICIAN ASSISTANT

## 2020-02-19 PROCEDURE — 1036F TOBACCO NON-USER: CPT | Performed by: PHYSICIAN ASSISTANT

## 2020-02-19 PROCEDURE — 3046F HEMOGLOBIN A1C LEVEL >9.0%: CPT | Performed by: PHYSICIAN ASSISTANT

## 2020-02-19 PROCEDURE — 3079F DIAST BP 80-89 MM HG: CPT | Performed by: PHYSICIAN ASSISTANT

## 2020-02-19 PROCEDURE — 1111F DSCHRG MED/CURRENT MED MERGE: CPT | Performed by: PHYSICIAN ASSISTANT

## 2020-02-19 RX ORDER — PREDNISONE 1 MG/1
TABLET ORAL
Qty: 100 TABLET | Refills: 2 | Status: SHIPPED | OUTPATIENT
Start: 2020-02-19 | End: 2020-08-04

## 2020-02-19 NOTE — TELEPHONE ENCOUNTER
I spoke to pharmacy  They are running the rx through [employer]  The meters are covered but not any of the test strips are not  Which makes no sense  I filled out a prior auth request and submitted it  Pt is aware

## 2020-02-19 NOTE — LETTER
February 19, 2020     Matthias Boles, [address]  [address]    Patient: Vidya Carlos   YOB: 1960   Date of Visit: 2/19/2020       Dear Dr Shashi Lomax: Thank you for referring Vidya Carlos to me for evaluation  Below are my notes for this consultation  If you have questions, please do not hesitate to call me  I look forward to following your patient along with you  Sincerely,        Vineet Anderson PA-C        CC: No Recipients  Vineet Anderson PA-C  2/19/2020 12:50 PM  Sign at close encounter  [facility] - Outpatient Follow-up Note  Vidya Carlos 63 y o  female MRN: [id number]  Encounter: [id number]          ASSESSMENT AND PLAN:      1  Colitis  She was recently admitted with 1 month of bloody diarrhea and abdominal pain found to have severe colitis on colonoscopy  Biopsies revealed acute and chronic colitis with cryptitis and glandular disarray  CRP and fecal calprotectin significantly elevated  Differential diagnosis includes inflammatory bowel disease, ischemic colitis, infectious colitis  Her symptoms have improved with prednisone  I recommend tapering prednisone by 5 mg weekly until finished  We will schedule colonoscopy in about 2 months to assess for healing of colitis, take more biopsies, and rule out underlying neoplasm  She will then follow up with Dr Frederick Romeo in the office to discuss plan moving forward  She was instructed to call the office if her symptoms return in the meantime  - predniSONE 5 mg tablet; Take 15 mg daily by mouth x 1 week, then 10 mg daily by mouth x 1 week, then 5 mg daily x 1 week then STOP  Dispense: 100 tablet; Refill: 2  - Colonoscopy; Future  - Na Sulfate-K Sulfate-Mg Sulf (Suprep Bowel Prep Kit) 17 5-3 13-1 6 GM/177ML SOLN; Take as directed by the office  Dispense: 2 Bottle; Refill: 0    2   Elevated liver enzymes  Liver enzymes were significantly elevated during recent hospitalization - AST/ALT greater than 3000 likely secondary to shock liver from dehydration in the setting of severe diarrhea  Right upper quadrant ultrasound unremarkable  Lab work-up negative including iron panel, autoimmune serologies, viral serologies, alpha-1 antitrypsin, ceruloplasmin  Most recent blood work from 2/9/20 shows normalization of AST and ALT which is good news  3  Gastroesophageal reflux disease, esophagitis presence not specified  She reports GERD symptoms for more than 20 years  We will schedule EGD to to rule out Cardenas's esophagus given longstanding GERD  She takes OTC antacids as needed  Continue dietary and lifestyle modifications as needed  - EGD; Future    Follow-up after EGD/colonoscopy with Dr Chris Patel  ______________________________________________________________________    SUBJECTIVE:  81-year-old female with history of type 2 diabetes mellitus, hypertension, anxiety, and depression presenting for hospital follow-up of colitis  [place] [facility] [place] with bloody diarrhea and abdominal pain x 1 month  CT abdomen pelvis with contrast revealed diffuse ascending, transverse, descending and sigmoid bowel wall thickening consistent with colitis  C diff and stool enteric panel negative  CRP significantly elevated > 90 and fecal calprotectin nearly 3000 suggestive of IBD  She underwent colonoscopy which showed mild inflammation in the rectum with significant ulcerated and hemorrhagic inflammation with pseudopolyps and cobblestoning from the sigmoid through the hepatic flexure  Scope was not advanced beyond hepatic flexure due to severe inflammation  Biopsies eventually showed acute and chronic colitis  She was treated with antibiotics and eventually prednisone, but technically the prednisone was prescribed for upper respiratory symptoms  She is feeling much better now except for weakness and fatigue, which she attributes to her recent hospitalization  She denies abdominal pain  She has mostly formed stools  She did have a few loose stools since discharge, but this has been minor  She denies blood in the stool  She denies nausea and vomiting  She reports longstanding history of GERD (> 20 years) for which she has taken Pepcid and Prilosec in the past   She did have a severe bout of heartburn last week  REVIEW OF SYSTEMS IS OTHERWISE NEGATIVE  Historical Information   Past Medical History:   Diagnosis Date    Anxiety     Depression     Diabetes ([facility] )     Esophageal reflux     28 APR 2015 RESOLVED    Hyperlipidemia     Hypertension     Low back pain     sciatica    Stroke ([facility] ) 12/2015    small vessel, L frontal corona radiata  Rx asa 81, Lipitor 40, risk modification    Vitamin D deficiency      History reviewed  No pertinent surgical history    Social History   Social History     Substance and Sexual Activity   Alcohol Use No    Comment: OCCASIONAL ALCOHOL USE IN ALL SCRIPTS     Social History     Substance and Sexual Activity   Drug Use No     Social History     Tobacco Use   Smoking Status Former Smoker   Smokeless Tobacco Never Used     Family History   Problem Relation Age of Onset    Diabetes Mother     Lung cancer Mother     Cancer Father     Lung cancer Father     Hypertension Brother     Hypertension Family        Meds/Allergies       Current Outpatient Medications:     ALPRAZolam (XANAX) 0 5 mg tablet    amLODIPine (NORVASC) 10 mg tablet    aspirin 81 mg chewable tablet    benzonatate (TESSALON PERLES) 100 mg capsule    gabapentin (NEURONTIN) 100 mg capsule    guaiFENesin (MUCINEX) 600 mg 12 hr tablet    insulin glargine (LANTUS SOLOSTAR) 100 units/mL injection pen    Insulin Pen Needle (PEN NEEDLES) 32G X 5 MM MISC    lisinopril (ZESTRIL) 40 mg tablet    metFORMIN (GLUCOPHAGE) 500 mg tablet    metoprolol succinate (TOPROL-XL) 100 mg 24 hr tablet    Multiple Vitamin (MULTIVITAMIN) tablet    predniSONE 20 mg tablet    Na Sulfate-K Sulfate-Mg Sulf (Suprep Bowel Prep Kit) 17 5-3 13-1 6 GM/177ML SOLN    predniSONE 5 mg tablet    No Known Allergies        Objective     Blood pressure 144/82, pulse 69, temperature 98 9 °F (37 2 °C), temperature source Tympanic, height 5' 3" (1 6 m), weight 81 2 kg (179 lb), not currently breastfeeding  Body mass index is 31 71 kg/m²  PHYSICAL EXAM:      General Appearance:   Alert, cooperative, no distress   HEENT:   Normocephalic, atraumatic, anicteric      Neck:  Supple, symmetrical, trachea midline   Lungs:   Clear to auscultation bilaterally; no rales, rhonchi or wheezing; respirations unlabored    Heart[de-identified]   Regular rate and rhythm; no murmur, rub, or gallop  Abdomen:   Soft, non-tender, non-distended; normal bowel sounds; no masses, no organomegaly    Genitalia:   Deferred    Rectal:   Deferred    Extremities:  No cyanosis, clubbing or edema    Pulses:  2+ and symmetric    Skin:  No jaundice, rashes, or lesions    Lymph nodes:  No palpable cervical lymphadenopathy        Lab Results:   No visits with results within 1 Day(s) from this visit  Latest known visit with results is:   No results displayed because visit has over 200 results  Radiology Results:   Xr Chest Pa & Lateral    Result Date: 2/6/2020  Narrative: CHEST INDICATION:   fever/cough r/o pneumonia  COMPARISON:  March 30, 2018 EXAM PERFORMED/VIEWS:  XR CHEST PA & LATERAL FINDINGS: Cardiomediastinal silhouette appears unremarkable  The lungs are clear  No pneumothorax or pleural effusion  Osseous structures appear within normal limits for patient age  Impression: No acute cardiopulmonary disease  Workstation performed: [id number]     Us Right Upper Quadrant    Result Date: 1/31/2020  Narrative: RIGHT UPPER QUADRANT ULTRASOUND INDICATION:     elevated AST/ALT  COMPARISON:  CT abdomen and pelvis from the same day   TECHNIQUE:   Real-time ultrasound of the right upper quadrant was performed with a curvilinear transducer with both volumetric sweeps and still imaging techniques  FINDINGS: PANCREAS:  Visualized portions of the pancreas are within normal limits  AORTA AND IVC:  Visualized portions are normal for patient age  LIVER: Size:  Mildly enlarged  The liver measures 17 2 cm in the midclavicular line  Contour:  Surface contour is smooth  Parenchyma:  Echogenicity and echotexture are within normal limits  No evidence of suspicious mass  Limited imaging of the main portal vein shows it to be patent and hepatopetal   BILIARY: No gallbladder findings  No intrahepatic biliary dilatation  CBD measures 3 mm  No choledocholithiasis  KIDNEY: Right kidney measures 11 6 x 5 6 cm  Within normal limits  1 cm simple cyst in the right kidney  ASCITES:   None  Impression: 1 cm simple cyst in the right kidney  Normal  Workstation performed: [id number]     Ct Abdomen Pelvis W Contrast    Result Date: 1/31/2020  Narrative: CT ABDOMEN AND PELVIS WITH IV CONTRAST INDICATION:   R10 32: Left lower quadrant pain  Nausea/vomiting  Blood in the stools  Patient complains of fatigue, headache, fever and chills  COMPARISON:  None  TECHNIQUE:  CT examination of the abdomen and pelvis was performed  Axial, sagittal, and coronal 2D reformatted images were created from the source data and submitted for interpretation  Radiation dose length product (DLP) for this visit:  666 mGy-cm   This examination, like all CT scans performed in the [facility], was performed utilizing techniques to minimize radiation dose exposure, including the use of iterative reconstruction and automated exposure control  IV Contrast:  100 mL of iohexol (OMNIPAQUE) Enteric Contrast:  Enteric contrast was administered  FINDINGS: ABDOMEN LOWER CHEST:  No clinically significant abnormality identified in the visualized lower chest  LIVER/BILIARY TREE:  Unremarkable  GALLBLADDER:  No calcified gallstones  No pericholecystic inflammatory change  SPLEEN:  Unremarkable  PANCREAS:  Atrophic  ADRENAL GLANDS:  Focal calcification in the lateral limb of the left adrenal gland likely secondary to prior granulomatous infection  KIDNEYS/URETERS:  1 cm hypodense lesion within the inferior pole of the right kidney  This is indeterminate by Hounsfield measurements  Statistically, this is likely to represent a cyst but sonographic correlation is recommended for confirmation  STOMACH AND BOWEL:  There is diffuse bowel wall thickening and engorgement of the pericolonic vasculature from the ascending colon to the sigmoid colon consistent with colitis  APPENDIX:  No findings to suggest appendicitis  ABDOMINOPELVIC CAVITY:  Scattered subcentimeter lymph nodes at the mesenteric root, likely reactive /inflammatory  No free fluid  No pneumoperitoneum  VESSELS:  Extensive atherosclerotic calcification of the aorta and its branches without aneurysmal dilatation  PELVIS REPRODUCTIVE ORGANS:  IUD in place  Hypodense round lesion within the uterine fundus measuring approximately 2 cm, likely representing a fibroid  URINARY BLADDER:  Unremarkable  ABDOMINAL WALL/INGUINAL REGIONS:  Unremarkable  OSSEOUS STRUCTURES:  No acute fracture or destructive osseous lesion  Impression: Diffuse ascending, transverse, descending and sigmoid bowel wall thickening consistent with colitis  Correlate for infectious/inflammatory etiology  Approximate 1 cm indeterminate hypodense lesion within the right kidney  Nonemergent follow-up sonography is recommended for additional characterization  Extensive atherosclerotic disease of the aorta and its branches  IUD in place  Approximate 2 cm uterine fibroid   Workstation performed: [id number]

## 2020-02-19 NOTE — PATIENT INSTRUCTIONS
EGD/COLON scheduled with Dr Murguia at [facility] on 4/20/20  Ophelia gave patient verbal instructions/paper work given    [employer]

## 2020-02-19 NOTE — PROGRESS NOTES
[facility] - Outpatient Follow-up Note  Mariah Hooker 63 y o  female MRN: [id number]  Encounter: [id number]          ASSESSMENT AND PLAN:      1  Colitis  She was recently admitted with 1 month of bloody diarrhea and abdominal pain found to have severe colitis on colonoscopy  Biopsies revealed acute and chronic colitis with cryptitis and glandular disarray  CRP and fecal calprotectin significantly elevated  Differential diagnosis includes inflammatory bowel disease, ischemic colitis, infectious colitis  Her symptoms have improved with prednisone  I recommend tapering prednisone by 5 mg weekly until finished  We will schedule colonoscopy in about 2 months to assess for healing of colitis, take more biopsies, and rule out underlying neoplasm  She will then follow up with Dr Catherine Siu in the office to discuss plan moving forward  She was instructed to call the office if her symptoms return in the meantime  - predniSONE 5 mg tablet; Take 15 mg daily by mouth x 1 week, then 10 mg daily by mouth x 1 week, then 5 mg daily x 1 week then STOP  Dispense: 100 tablet; Refill: 2  - Colonoscopy; Future  - Na Sulfate-K Sulfate-Mg Sulf (Suprep Bowel Prep Kit) 17 5-3 13-1 6 GM/177ML SOLN; Take as directed by the office  Dispense: 2 Bottle; Refill: 0    2  Elevated liver enzymes  Liver enzymes were significantly elevated during recent hospitalization - AST/ALT greater than 3000 likely secondary to shock liver from dehydration in the setting of severe diarrhea  Right upper quadrant ultrasound unremarkable  Lab work-up negative including iron panel, autoimmune serologies, viral serologies, alpha-1 antitrypsin, ceruloplasmin  Most recent blood work from 2/9/20 shows normalization of AST and ALT which is good news  3  Gastroesophageal reflux disease, esophagitis presence not specified  She reports GERD symptoms for more than 20 years    We will schedule EGD to to rule out Cardenas's esophagus given longstanding GERD  She takes OTC antacids as needed  Continue dietary and lifestyle modifications as needed  - EGD; Future    Follow-up after EGD/colonoscopy with Dr Doree Bence  ______________________________________________________________________    SUBJECTIVE:  28-year-old female with history of type 2 diabetes mellitus, hypertension, anxiety, and depression presenting for hospital follow-up of colitis  She was admitted to [facility] 1/31/20 with bloody diarrhea and abdominal pain x 1 month  CT abdomen pelvis with contrast revealed diffuse ascending, transverse, descending and sigmoid bowel wall thickening consistent with colitis  C diff and stool enteric panel negative  CRP significantly elevated > 90 and fecal calprotectin nearly 3000 suggestive of IBD  She underwent colonoscopy which showed mild inflammation in the rectum with significant ulcerated and hemorrhagic inflammation with pseudopolyps and cobblestoning from the sigmoid through the hepatic flexure  Scope was not advanced beyond hepatic flexure due to severe inflammation  Biopsies eventually showed acute and chronic colitis  She was treated with antibiotics and eventually prednisone, but technically the prednisone was prescribed for upper respiratory symptoms  She is feeling much better now except for weakness and fatigue, which she attributes to her recent hospitalization  She denies abdominal pain  She has mostly formed stools  She did have a few loose stools since discharge, but this has been minor  She denies blood in the stool  She denies nausea and vomiting  She reports longstanding history of GERD (> 20 years) for which she has taken Pepcid and Prilosec in the past   She did have a severe bout of heartburn last week  REVIEW OF SYSTEMS IS OTHERWISE NEGATIVE        Historical Information   Past Medical History:   Diagnosis Date    Anxiety     Depression     Diabetes [facility] [address] [facility]     Esophageal reflux     28 APR 2015 RESOLVED    Hyperlipidemia     Hypertension     Low back pain     sciatica    Stroke ([facility] [address] [facility] 12/2015    small vessel, L frontal corona radiata  Rx asa 81, Lipitor 40, risk modification    Vitamin D deficiency      History reviewed  No pertinent surgical history  Social History   Social History     Substance and Sexual Activity   Alcohol Use No    Comment: OCCASIONAL ALCOHOL USE IN ALL SCRIPTS     Social History     Substance and Sexual Activity   Drug Use No     Social History     Tobacco Use   Smoking Status Former Smoker   Smokeless Tobacco Never Used     Family History   Problem Relation Age of Onset    Diabetes Mother     Lung cancer Mother     Cancer Father     Lung cancer Father     Hypertension Brother     Hypertension Family        Meds/Allergies       Current Outpatient Medications:     ALPRAZolam (XANAX) 0 5 mg tablet    amLODIPine (NORVASC) 10 mg tablet    aspirin 81 mg chewable tablet    benzonatate (TESSALON PERLES) 100 mg capsule    gabapentin (NEURONTIN) 100 mg capsule    guaiFENesin (MUCINEX) 600 mg 12 hr tablet    insulin glargine (LANTUS SOLOSTAR) 100 units/mL injection pen    Insulin Pen Needle (PEN NEEDLES) 32G X 5 MM MISC    lisinopril (ZESTRIL) 40 mg tablet    metFORMIN (GLUCOPHAGE) 500 mg tablet    metoprolol succinate (TOPROL-XL) 100 mg 24 hr tablet    Multiple Vitamin (MULTIVITAMIN) tablet    predniSONE 20 mg tablet    Na Sulfate-K Sulfate-Mg Sulf (Suprep Bowel Prep Kit) 17 5-3 13-1 6 GM/177ML SOLN    predniSONE 5 mg tablet    No Known Allergies        Objective     Blood pressure 144/82, pulse 69, temperature 98 9 °F (37 2 °C), temperature source Tympanic, height 5' 3" (1 6 m), weight 81 2 kg (179 lb), not currently breastfeeding  Body mass index is 31 71 kg/m²        PHYSICAL EXAM:      General Appearance:   Alert, cooperative, no distress   HEENT:   Normocephalic, atraumatic, anicteric      Neck:  Supple, symmetrical, trachea midline   Lungs:   Clear to auscultation bilaterally; no rales, rhonchi or wheezing; respirations unlabored    Heart[de-identified]   Regular rate and rhythm; no murmur, rub, or gallop  Abdomen:   Soft, non-tender, non-distended; normal bowel sounds; no masses, no organomegaly    Genitalia:   Deferred    Rectal:   Deferred    Extremities:  No cyanosis, clubbing or edema    Pulses:  2+ and symmetric    Skin:  No jaundice, rashes, or lesions    Lymph nodes:  No palpable cervical lymphadenopathy        Lab Results:   No visits with results within 1 Day(s) from this visit  Latest known visit with results is:   No results displayed because visit has over 200 results  Radiology Results:   Xr Chest Pa & Lateral    Result Date: 2/6/2020  Narrative: CHEST INDICATION:   fever/cough r/o pneumonia  COMPARISON:  March 30, 2018 EXAM PERFORMED/VIEWS:  XR CHEST PA & LATERAL FINDINGS: Cardiomediastinal silhouette appears unremarkable  The lungs are clear  No pneumothorax or pleural effusion  Osseous structures appear within normal limits for patient age  Impression: No acute cardiopulmonary disease  Workstation performed: [id number]     Us Right Upper Quadrant    Result Date: 1/31/2020  Narrative: RIGHT UPPER QUADRANT ULTRASOUND INDICATION:     elevated AST/ALT  COMPARISON:  CT abdomen and pelvis from the same day  TECHNIQUE:   Real-time ultrasound of the right upper quadrant was performed with a curvilinear transducer with both volumetric sweeps and still imaging techniques  FINDINGS: PANCREAS:  Visualized portions of the pancreas are within normal limits  AORTA AND IVC:  Visualized portions are normal for patient age  LIVER: Size:  Mildly enlarged  The liver measures 17 2 cm in the midclavicular line  Contour:  Surface contour is smooth  Parenchyma:  Echogenicity and echotexture are within normal limits  No evidence of suspicious mass  Limited imaging of the main portal vein shows it to be patent and hepatopetal   BILIARY: No gallbladder findings  No intrahepatic biliary dilatation  CBD measures 3 mm  No choledocholithiasis  KIDNEY: Right kidney measures 11 6 x 5 6 cm  Within normal limits  1 cm simple cyst in the right kidney  ASCITES:   None  Impression: 1 cm simple cyst in the right kidney  Normal  Workstation performed: [id number]     Ct Abdomen Pelvis W Contrast    Result Date: 1/31/2020  Narrative: CT ABDOMEN AND PELVIS WITH IV CONTRAST INDICATION:   R10 32: Left lower quadrant pain  Nausea/vomiting  Blood in the stools  Patient complains of fatigue, headache, fever and chills  COMPARISON:  None  TECHNIQUE:  CT examination of the abdomen and pelvis was performed  Axial, sagittal, and coronal 2D reformatted images were created from the source data and submitted for interpretation  Radiation dose length product (DLP) for this visit:  666 mGy-cm   This examination, like all CT scans performed in the [facility], was performed utilizing techniques to minimize radiation dose exposure, including the use of iterative reconstruction and automated exposure control  IV Contrast:  100 mL of iohexol (OMNIPAQUE) Enteric Contrast:  Enteric contrast was administered  FINDINGS: ABDOMEN LOWER CHEST:  No clinically significant abnormality identified in the visualized lower chest  LIVER/BILIARY TREE:  Unremarkable  GALLBLADDER:  No calcified gallstones  No pericholecystic inflammatory change  SPLEEN:  Unremarkable  PANCREAS:  Atrophic  ADRENAL GLANDS:  Focal calcification in the lateral limb of the left adrenal gland likely secondary to prior granulomatous infection  KIDNEYS/URETERS:  1 cm hypodense lesion within the inferior pole of the right kidney  This is indeterminate by Hounsfield measurements  Statistically, this is likely to represent a cyst but sonographic correlation is recommended for confirmation   STOMACH AND BOWEL:  There is diffuse bowel wall thickening and engorgement of the pericolonic vasculature from the ascending colon to the sigmoid colon consistent with colitis  APPENDIX:  No findings to suggest appendicitis  ABDOMINOPELVIC CAVITY:  Scattered subcentimeter lymph nodes at the mesenteric root, likely reactive /inflammatory  No free fluid  No pneumoperitoneum  VESSELS:  Extensive atherosclerotic calcification of the aorta and its branches without aneurysmal dilatation  PELVIS REPRODUCTIVE ORGANS:  IUD in place  Hypodense round lesion within the uterine fundus measuring approximately 2 cm, likely representing a fibroid  URINARY BLADDER:  Unremarkable  ABDOMINAL WALL/INGUINAL REGIONS:  Unremarkable  OSSEOUS STRUCTURES:  No acute fracture or destructive osseous lesion  Impression: Diffuse ascending, transverse, descending and sigmoid bowel wall thickening consistent with colitis  Correlate for infectious/inflammatory etiology  Approximate 1 cm indeterminate hypodense lesion within the right kidney  Nonemergent follow-up sonography is recommended for additional characterization  Extensive atherosclerotic disease of the aorta and its branches  IUD in place  Approximate 2 cm uterine fibroid   Workstation performed: [id number]

## 2020-02-21 NOTE — TELEPHONE ENCOUNTER
ONE TOUCH TEST STRIPS WERE DENIED  LETTER STATES PT HAS TO GET THE TRUE METRIX TEST STRIPS AND METER  I LM AT [facility] AND PT WAS NOTIIFIED  IT LOOKS LIKE IT CAN BE BOUGHT OTC WITHOUT AN RX  [facility]

## 2020-03-24 ENCOUNTER — TELEPHONE (OUTPATIENT)
Dept: GASTROENTEROLOGY | Facility: MEDICAL CENTER | Age: 60
End: 2020-03-24

## 2020-03-24 NOTE — TELEPHONE ENCOUNTER
Left message for patient to call and reschedule procedure on 04/20 to July/august  I advised in the VM that this procedure will be cancelled

## 2020-04-14 DIAGNOSIS — F41.9 ANXIETY: ICD-10-CM

## 2020-04-15 ENCOUNTER — TELEMEDICINE (OUTPATIENT)
Dept: FAMILY MEDICINE CLINIC | Facility: CLINIC | Age: 60
End: 2020-04-15
Payer: COMMERCIAL

## 2020-04-15 DIAGNOSIS — N39.0 URINARY TRACT INFECTION WITHOUT HEMATURIA, SITE UNSPECIFIED: Primary | ICD-10-CM

## 2020-04-15 DIAGNOSIS — R39.9 UTI SYMPTOMS: ICD-10-CM

## 2020-04-15 PROCEDURE — 99213 OFFICE O/P EST LOW 20 MIN: CPT | Performed by: NURSE PRACTITIONER

## 2020-04-15 RX ORDER — SULFAMETHOXAZOLE AND TRIMETHOPRIM 800; 160 MG/1; MG/1
1 TABLET ORAL EVERY 12 HOURS SCHEDULED
Qty: 10 TABLET | Refills: 0 | Status: SHIPPED | OUTPATIENT
Start: 2020-04-15 | End: 2020-04-20

## 2020-04-16 RX ORDER — ALPRAZOLAM 0.5 MG/1
0.5 TABLET ORAL 3 TIMES DAILY PRN
Qty: 90 TABLET | Refills: 0 | Status: SHIPPED | OUTPATIENT
Start: 2020-04-16 | End: 2020-12-02 | Stop reason: ALTCHOICE

## 2020-04-28 DIAGNOSIS — IMO0002 UNCONTROLLED TYPE 2 DIABETES WITH NEUROPATHY: ICD-10-CM

## 2020-04-29 RX ORDER — GABAPENTIN 100 MG/1
CAPSULE ORAL
Qty: 270 CAPSULE | Refills: 0 | Status: SHIPPED | OUTPATIENT
Start: 2020-04-29 | End: 2020-08-17

## 2020-05-13 ENCOUNTER — TELEPHONE (OUTPATIENT)
Dept: FAMILY MEDICINE CLINIC | Facility: CLINIC | Age: 60
End: 2020-05-13

## 2020-05-20 ENCOUNTER — TELEPHONE (OUTPATIENT)
Dept: FAMILY MEDICINE CLINIC | Facility: CLINIC | Age: 60
End: 2020-05-20

## 2020-05-29 DIAGNOSIS — F41.9 ANXIETY: ICD-10-CM

## 2020-07-07 ENCOUNTER — TELEPHONE (OUTPATIENT)
Dept: FAMILY MEDICINE CLINIC | Facility: CLINIC | Age: 60
End: 2020-07-07

## 2020-07-07 NOTE — TELEPHONE ENCOUNTER
LMOM reminding patient to have labs done and then call to schedule follow up appointment  Ok per CC

## 2020-07-07 NOTE — TELEPHONE ENCOUNTER
Please call patient and let her know she is overdue for her follow-up  She also has labs that are due to be completed    Thank you

## 2020-07-16 ENCOUNTER — TELEPHONE (OUTPATIENT)
Dept: FAMILY MEDICINE CLINIC | Facility: CLINIC | Age: 60
End: 2020-07-16

## 2020-07-28 DIAGNOSIS — F41.9 ANXIETY: ICD-10-CM

## 2020-07-28 RX ORDER — SERTRALINE HYDROCHLORIDE 100 MG/1
TABLET, FILM COATED ORAL
Qty: 30 TABLET | Refills: 1 | Status: SHIPPED | OUTPATIENT
Start: 2020-07-28 | End: 2020-08-11

## 2020-07-29 DIAGNOSIS — I10 BENIGN ESSENTIAL HYPERTENSION: ICD-10-CM

## 2020-07-29 PROCEDURE — 4010F ACE/ARB THERAPY RXD/TAKEN: CPT | Performed by: NURSE PRACTITIONER

## 2020-07-29 RX ORDER — METOPROLOL SUCCINATE 100 MG/1
TABLET, EXTENDED RELEASE ORAL
Qty: 90 TABLET | Refills: 1 | OUTPATIENT
Start: 2020-07-29

## 2020-07-29 RX ORDER — LISINOPRIL 40 MG/1
TABLET ORAL
Qty: 90 TABLET | Refills: 1 | OUTPATIENT
Start: 2020-07-29

## 2020-08-04 ENCOUNTER — ANESTHESIA EVENT (OUTPATIENT)
Dept: GASTROENTEROLOGY | Facility: MEDICAL CENTER | Age: 60
End: 2020-08-04

## 2020-08-04 ENCOUNTER — HOSPITAL ENCOUNTER (OUTPATIENT)
Dept: GASTROENTEROLOGY | Facility: MEDICAL CENTER | Age: 60
Setting detail: OUTPATIENT SURGERY
Discharge: HOME | End: 2020-08-04
Payer: COMMERCIAL

## 2020-08-04 ENCOUNTER — ANESTHESIA (OUTPATIENT)
Dept: GASTROENTEROLOGY | Facility: MEDICAL CENTER | Age: 60
End: 2020-08-04

## 2020-08-04 VITALS
BODY MASS INDEX: 37.21 KG/M2 | RESPIRATION RATE: 20 BRPM | HEIGHT: 63 IN | TEMPERATURE: 98.5 F | DIASTOLIC BLOOD PRESSURE: 70 MMHG | WEIGHT: 210 LBS | SYSTOLIC BLOOD PRESSURE: 143 MMHG | OXYGEN SATURATION: 97 % | HEART RATE: 97 BPM

## 2020-08-04 DIAGNOSIS — K52.9 COLITIS: ICD-10-CM

## 2020-08-04 DIAGNOSIS — K21.9 GASTROESOPHAGEAL REFLUX DISEASE, ESOPHAGITIS PRESENCE NOT SPECIFIED: ICD-10-CM

## 2020-08-04 PROCEDURE — 88305 TISSUE EXAM BY PATHOLOGIST: CPT | Performed by: PATHOLOGY

## 2020-08-04 PROCEDURE — 43239 EGD BIOPSY SINGLE/MULTIPLE: CPT | Performed by: INTERNAL MEDICINE

## 2020-08-04 PROCEDURE — 45380 COLONOSCOPY AND BIOPSY: CPT | Performed by: INTERNAL MEDICINE

## 2020-08-04 PROCEDURE — 88342 IMHCHEM/IMCYTCHM 1ST ANTB: CPT | Performed by: PATHOLOGY

## 2020-08-04 RX ORDER — PROPOFOL 10 MG/ML
INJECTION, EMULSION INTRAVENOUS AS NEEDED
Status: DISCONTINUED | OUTPATIENT
Start: 2020-08-04 | End: 2020-08-04

## 2020-08-04 RX ORDER — SODIUM CHLORIDE 9 MG/ML
125 INJECTION, SOLUTION INTRAVENOUS CONTINUOUS
Status: DISCONTINUED | OUTPATIENT
Start: 2020-08-04 | End: 2020-08-08 | Stop reason: HOSPADM

## 2020-08-04 RX ADMIN — PROPOFOL 50 MG: 10 INJECTION, EMULSION INTRAVENOUS at 08:22

## 2020-08-04 RX ADMIN — SODIUM CHLORIDE 125 ML/HR: 0.9 INJECTION, SOLUTION INTRAVENOUS at 07:32

## 2020-08-04 RX ADMIN — PROPOFOL 150 MG: 10 INJECTION, EMULSION INTRAVENOUS at 08:11

## 2020-08-04 RX ADMIN — PROPOFOL 100 MG: 10 INJECTION, EMULSION INTRAVENOUS at 08:28

## 2020-08-04 RX ADMIN — PROPOFOL 100 MG: 10 INJECTION, EMULSION INTRAVENOUS at 08:18

## 2020-08-04 RX ADMIN — PROPOFOL 100 MG: 10 INJECTION, EMULSION INTRAVENOUS at 08:16

## 2020-08-04 NOTE — ANESTHESIA PREPROCEDURE EVALUATION
Procedure:  EGD  COLONOSCOPY    Relevant Problems   ANESTHESIA (within normal limits)      CARDIO   (+) Benign essential hypertension   (+) Familial combined hyperlipidemia   (+) Systolic murmur      ENDO   (+) Uncontrolled type 2 diabetes with neuropathy (HCC)      GI/HEPATIC   (+) Esophageal reflux      /RENAL (within normal limits)      GYN (within normal limits)      HEMATOLOGY (within normal limits)      MUSCULOSKELETAL   (+) Arthritis      NEURO/PSYCH   (+) Anxiety   (+) Depression   (+) Diabetic peripheral neuropathy (HCC)      PULMONARY (within normal limits)        Physical Exam    Airway    Mallampati score: II  TM Distance: >3 FB  Neck ROM: full     Dental       Cardiovascular  Rhythm: regular, Rate: normal,     Pulmonary  Breath sounds clear to auscultation,     Other Findings        Anesthesia Plan  ASA Score- 3     Anesthesia Type- IV sedation with anesthesia with ASA Monitors  Additional Monitors:   Airway Plan:           Plan Factors-Exercise tolerance (METS): >4 METS  Chart reviewed  Patient is not a current smoker  Patient not instructed to abstain from smoking on day of procedure  Patient did not smoke on day of surgery  Induction- intravenous  Postoperative Plan-     Informed Consent- Anesthetic plan and risks discussed with patient

## 2020-08-04 NOTE — DISCHARGE INSTRUCTIONS
Upper Endoscopy   WHAT YOU NEED TO KNOW:   An upper endoscopy is also called an upper gastrointestinal (GI) endoscopy, or an esophagogastroduodenoscopy (EGD)  You may feel bloated, gassy, or have some abdominal discomfort after your procedure  Your throat may be sore for 24 to 36 hours  You may burp or pass gas from air that is still inside your body  DISCHARGE INSTRUCTIONS:   Call 911 if:   · You have sudden chest pain or trouble breathing  Seek care immediately if:   · You feel dizzy or faint  · You have trouble swallowing  · You have severe throat pain  · Your bowel movements are very dark or black  · Your abdomen is hard and firm and you have severe pain  · You vomit blood  Contact your healthcare provider if:   · You feel full or bloated and cannot burp or pass gas  · You have not had a bowel movement for 3 days after your procedure  · You have neck pain  · You have a fever or chills  · You have nausea or are vomiting  · You have a rash or hives  · You have questions or concerns about your endoscopy  Relieve a sore throat:  Suck on throat lozenges or crushed ice  Gargle with a small amount of warm salt water  Mix 1 teaspoon of salt and 1 cup of warm water to make salt water  Relieve gas and discomfort from bloating:  Lie on your right side with a heating pad on your abdomen  Take short walks to help pass gas  Eat small meals until bloating is relieved  Rest after your procedure:  Do not drive or make important decisions until the day after your procedure  Return to your normal activity as directed  You can usually return to work the day after your procedure  Follow up with your healthcare provider as directed:  Write down your questions so you remember to ask them during your visits  © 2017 [employer] Information is for End User's use only and may not be sold, redistributed or otherwise used for commercial purposes   All illustrations and images included in CareNotes® are the copyrighted property of A [employer] M , Inc  or [employer]  The above information is an  only  It is not intended as medical advice for individual conditions or treatments  Talk to your doctor, nurse or pharmacist before following any medical regimen to see if it is safe and effective for you  Colonoscopy   WHAT YOU NEED TO KNOW:   A colonoscopy is a procedure to examine the inside of your colon (intestine) with a scope  Polyps or tissue growths may have been removed during your colonoscopy  It is normal to feel bloated and to have some abdominal discomfort  You should be passing gas  If you have hemorrhoids or you had polyps removed, you may have a small amount of bleeding  DISCHARGE INSTRUCTIONS:   Seek care immediately if:   · You have a large amount of bright red blood in your bowel movements  · Your abdomen is hard and firm and you have severe pain  · You have sudden trouble breathing  Contact your healthcare provider if:   · You develop a rash or hives  · You have a fever within 24 hours of your procedure  · You have not had a bowel movement for 3 days after your procedure  · You have questions or concerns about your condition or care  Activity:   · Do not lift, strain, or run  for 3 days after your procedure  · Rest after your procedure  You have been given medicine to relax you  Do not  drive or make important decisions until the day after your procedure  Return to your normal activity as directed  · Relieve gas and discomfort from bloating  by lying on your right side with a heating pad on your abdomen  You may need to take short walks to help the gas move out  Eat small meals until bloating is relieved  If you had polyps removed: For 7 days after your procedure:  · Do not  take aspirin  · Do not  go on long car rides  Help prevent constipation:   · Eat a variety of healthy foods    Healthy foods include fruit, vegetables, whole-grain breads, low-fat dairy products, beans, lean meat, and fish  Ask if you need to be on a special diet  Your healthcare provider may recommend that you eat high-fiber foods such as cooked beans  Fiber helps you have regular bowel movements  · Drink liquids as directed  Adults should drink between 9 and 13 eight-ounce cups of liquid every day  Ask what amount is best for you  For most people, good liquids to drink are water, juice, and milk  · Exercise as directed  Talk to your healthcare provider about the best exercise plan for you  Exercise can help prevent constipation, decrease your blood pressure and improve your health  Follow up with your healthcare provider as directed:  Write down your questions so you remember to ask them during your visits  © 2017 [employer] Information is for End User's use only and may not be sold, redistributed or otherwise used for commercial purposes  All illustrations and images included in CareNotes® are the copyrighted property of [employer]  or [employer]  The above information is an  only  It is not intended as medical advice for individual conditions or treatments  Talk to your doctor, nurse or pharmacist before following any medical regimen to see if it is safe and effective for you

## 2020-08-04 NOTE — ANESTHESIA POSTPROCEDURE EVALUATION
Post-Op Assessment Note    CV Status:  Stable  Pain Score: 0    Pain management: adequate     Mental Status:  Alert and awake   Hydration Status:  Euvolemic and stable   PONV Controlled:  Controlled   Airway Patency:  Patent      Post Op Vitals Reviewed: Yes      Staff: Anesthesiologist         No complications documented      /70   Pulse 97   Temp 98 5 °F (36 9 °C) (Temporal)   Resp 20   Ht 5' 3"   Wt 95 3 kg (210 lb)   SpO2 97%   BMI 37 20 kg/m²     /70 (08/04/20 0850)    Temp      Pulse 97 (08/04/20 0850)   Resp 20 (08/04/20 0850)    SpO2 97 % (08/04/20 0850)

## 2020-08-04 NOTE — H&P
History and Physical -  [facility]  Ang Jeffery 63 y o  female MRN: [id number]                  HPI: Ang Jeffery is a 63y o  year old female who presents for colitis and GERD  REVIEW OF SYSTEMS: Per the HPI, and otherwise unremarkable  Historical Information   Past Medical History:   Diagnosis Date    Anxiety     Depression     Diabetes ([facility] )     Esophageal reflux     28 APR 2015 RESOLVED    Hyperlipidemia     Hypertension     Low back pain     sciatica    Stroke ([facility] ) 12/2015    small vessel, L frontal corona radiata  Rx asa 81, Lipitor 40, risk modification    Vitamin D deficiency      No past surgical history on file  Social History   Social History     Substance and Sexual Activity   Alcohol Use No    Comment: OCCASIONAL ALCOHOL USE IN ALL SCRIPTS     Social History     Substance and Sexual Activity   Drug Use No     Social History     Tobacco Use   Smoking Status Former Smoker   Smokeless Tobacco Never Used     Family History   Problem Relation Age of Onset    Diabetes Mother     Lung cancer Mother     Cancer Father     Lung cancer Father     Hypertension Brother     Hypertension Family        Meds/Allergies     (Not in a hospital admission)      No Known Allergies    Objective     not currently breastfeeding  PHYSICAL EXAMINATION:    General Appearance:   Alert, cooperative, no distress   HEENT:  Normocephalic, atraumatic, anicteric  Neck supple, symmetrical, trachea midline  Lungs:   Equal chest rise and unlabored breathing, normal effort, no coughing  Cardiovascular:   No visualized JVD  Abdomen:   No abdominal distension  Skin:   No jaundice, rashes, or lesions  Musculoskeletal:   Normal range of motion visualized  Psych:  Normal affect and normal insight  Neuro:  Alert and appropriate  ASSESSMENT/PLAN:  This is a 63y o  year old female here for EGD and colonsocopy, and she is stable and optimized for her procedure

## 2020-08-10 ENCOUNTER — TELEPHONE (OUTPATIENT)
Dept: OTHER | Facility: OTHER | Age: 60
End: 2020-08-10

## 2020-08-11 DIAGNOSIS — F41.9 ANXIETY: ICD-10-CM

## 2020-08-11 RX ORDER — SERTRALINE HYDROCHLORIDE 100 MG/1
TABLET, FILM COATED ORAL
Qty: 90 TABLET | Refills: 0 | Status: SHIPPED | OUTPATIENT
Start: 2020-08-11 | End: 2020-08-12 | Stop reason: SDUPTHER

## 2020-08-12 DIAGNOSIS — F41.9 ANXIETY: ICD-10-CM

## 2020-08-12 RX ORDER — SERTRALINE HYDROCHLORIDE 100 MG/1
100 TABLET, FILM COATED ORAL DAILY
Qty: 90 TABLET | Refills: 0 | Status: SHIPPED | OUTPATIENT
Start: 2020-08-12 | End: 2020-11-17

## 2020-08-13 NOTE — TELEPHONE ENCOUNTER
Please call patient  She is overdue for a routine checkup    Please assist her in scheduling next available

## 2020-08-16 DIAGNOSIS — IMO0002 UNCONTROLLED TYPE 2 DIABETES WITH NEUROPATHY: ICD-10-CM

## 2020-08-16 DIAGNOSIS — I10 BENIGN ESSENTIAL HYPERTENSION: ICD-10-CM

## 2020-08-17 RX ORDER — GABAPENTIN 100 MG/1
CAPSULE ORAL
Qty: 270 CAPSULE | Refills: 0 | Status: SHIPPED | OUTPATIENT
Start: 2020-08-17 | End: 2020-12-02 | Stop reason: SDUPTHER

## 2020-08-19 ENCOUNTER — TELEPHONE (OUTPATIENT)
Dept: GASTROENTEROLOGY | Facility: MEDICAL CENTER | Age: 60
End: 2020-08-19

## 2020-08-19 DIAGNOSIS — I10 BENIGN ESSENTIAL HYPERTENSION: ICD-10-CM

## 2020-08-19 RX ORDER — AMLODIPINE BESYLATE 10 MG/1
TABLET ORAL
Qty: 90 TABLET | Refills: 0 | Status: SHIPPED | OUTPATIENT
Start: 2020-08-19 | End: 2020-08-19 | Stop reason: SDUPTHER

## 2020-08-19 RX ORDER — AMLODIPINE BESYLATE 10 MG/1
10 TABLET ORAL DAILY
Qty: 90 TABLET | Refills: 0 | Status: SHIPPED | OUTPATIENT
Start: 2020-08-19 | End: 2020-12-02 | Stop reason: ALTCHOICE

## 2020-08-19 NOTE — TELEPHONE ENCOUNTER
Call patient - I authorized refill, but she is overdue for a regular check up acording to Libertad's note  Please call her to schedule

## 2020-09-01 DIAGNOSIS — F41.9 ANXIETY: ICD-10-CM

## 2020-09-01 DIAGNOSIS — I10 BENIGN ESSENTIAL HYPERTENSION: ICD-10-CM

## 2020-09-02 RX ORDER — LISINOPRIL 40 MG/1
TABLET ORAL
Qty: 30 TABLET | Refills: 0 | Status: SHIPPED | OUTPATIENT
Start: 2020-09-02 | End: 2020-10-05

## 2020-09-02 RX ORDER — ALPRAZOLAM 0.5 MG/1
0.5 TABLET ORAL 3 TIMES DAILY PRN
Qty: 90 TABLET | Refills: 0 | OUTPATIENT
Start: 2020-09-02

## 2020-09-02 RX ORDER — METOPROLOL SUCCINATE 100 MG/1
TABLET, EXTENDED RELEASE ORAL
Qty: 30 TABLET | Refills: 0 | Status: SHIPPED | OUTPATIENT
Start: 2020-09-02 | End: 2020-10-05

## 2020-10-01 DIAGNOSIS — I10 BENIGN ESSENTIAL HYPERTENSION: ICD-10-CM

## 2020-10-05 RX ORDER — METOPROLOL SUCCINATE 100 MG/1
TABLET, EXTENDED RELEASE ORAL
Qty: 30 TABLET | Refills: 0 | Status: SHIPPED | OUTPATIENT
Start: 2020-10-05 | End: 2020-11-04 | Stop reason: SDUPTHER

## 2020-10-05 RX ORDER — LISINOPRIL 40 MG/1
TABLET ORAL
Qty: 30 TABLET | Refills: 0 | Status: SHIPPED | OUTPATIENT
Start: 2020-10-05 | End: 2020-11-04 | Stop reason: SDUPTHER

## 2020-11-04 DIAGNOSIS — I10 BENIGN ESSENTIAL HYPERTENSION: ICD-10-CM

## 2020-11-04 RX ORDER — METOPROLOL SUCCINATE 100 MG/1
100 TABLET, EXTENDED RELEASE ORAL DAILY
Qty: 30 TABLET | Refills: 0 | Status: SHIPPED | OUTPATIENT
Start: 2020-11-04 | End: 2020-12-02 | Stop reason: DRUGHIGH

## 2020-11-04 RX ORDER — LISINOPRIL 40 MG/1
40 TABLET ORAL DAILY
Qty: 30 TABLET | Refills: 0 | Status: SHIPPED | OUTPATIENT
Start: 2020-11-04 | End: 2020-12-02 | Stop reason: DRUGHIGH

## 2020-11-16 DIAGNOSIS — F41.9 ANXIETY: ICD-10-CM

## 2020-11-16 DIAGNOSIS — IMO0002 UNCONTROLLED TYPE 2 DIABETES WITH NEUROPATHY: ICD-10-CM

## 2020-11-17 ENCOUNTER — LAB (OUTPATIENT)
Dept: LAB | Facility: CLINIC | Age: 60
End: 2020-11-17
Payer: COMMERCIAL

## 2020-11-17 DIAGNOSIS — R39.9 UTI SYMPTOMS: ICD-10-CM

## 2020-11-17 DIAGNOSIS — IMO0002 UNCONTROLLED TYPE 2 DIABETES WITH NEUROPATHY: ICD-10-CM

## 2020-11-17 DIAGNOSIS — I10 BENIGN ESSENTIAL HYPERTENSION: ICD-10-CM

## 2020-11-17 DIAGNOSIS — E78.2 MIXED HYPERLIPIDEMIA: ICD-10-CM

## 2020-11-17 DIAGNOSIS — Z13.29 SCREENING FOR THYROID DISORDER: ICD-10-CM

## 2020-11-17 LAB
ALBUMIN SERPL BCP-MCNC: 3.2 G/DL (ref 3.5–5)
ALP SERPL-CCNC: 168 U/L (ref 46–116)
ALT SERPL W P-5'-P-CCNC: 31 U/L (ref 12–78)
ANION GAP SERPL CALCULATED.3IONS-SCNC: 7 MMOL/L (ref 4–13)
AST SERPL W P-5'-P-CCNC: 16 U/L (ref 5–45)
BASOPHILS # BLD AUTO: 0.05 THOUSANDS/ΜL (ref 0–0.1)
BASOPHILS NFR BLD AUTO: 1 % (ref 0–1)
BILIRUB SERPL-MCNC: 0.4 MG/DL (ref 0.2–1)
BUN SERPL-MCNC: 13 MG/DL (ref 5–25)
CALCIUM ALBUM COR SERPL-MCNC: 9.9 MG/DL (ref 8.3–10.1)
CALCIUM SERPL-MCNC: 9.3 MG/DL (ref 8.3–10.1)
CHLORIDE SERPL-SCNC: 104 MMOL/L (ref 100–108)
CHOLEST SERPL-MCNC: 380 MG/DL (ref 50–200)
CO2 SERPL-SCNC: 25 MMOL/L (ref 21–32)
CREAT SERPL-MCNC: 0.76 MG/DL (ref 0.6–1.3)
EOSINOPHIL # BLD AUTO: 0.32 THOUSAND/ΜL (ref 0–0.61)
EOSINOPHIL NFR BLD AUTO: 3 % (ref 0–6)
ERYTHROCYTE [DISTWIDTH] IN BLOOD BY AUTOMATED COUNT: 14.2 % (ref 11.6–15.1)
GFR SERPL CREATININE-BSD FRML MDRD: 86 ML/MIN/1.73SQ M
GLUCOSE P FAST SERPL-MCNC: 327 MG/DL (ref 65–99)
HCT VFR BLD AUTO: 45.3 % (ref 34.8–46.1)
HDLC SERPL-MCNC: 50 MG/DL
HGB BLD-MCNC: 14.1 G/DL (ref 11.5–15.4)
IMM GRANULOCYTES # BLD AUTO: 0.03 THOUSAND/UL (ref 0–0.2)
IMM GRANULOCYTES NFR BLD AUTO: 0 % (ref 0–2)
LYMPHOCYTES # BLD AUTO: 2.31 THOUSANDS/ΜL (ref 0.6–4.47)
LYMPHOCYTES NFR BLD AUTO: 23 % (ref 14–44)
MCH RBC QN AUTO: 25.7 PG (ref 26.8–34.3)
MCHC RBC AUTO-ENTMCNC: 31.1 G/DL (ref 31.4–37.4)
MCV RBC AUTO: 83 FL (ref 82–98)
MONOCYTES # BLD AUTO: 0.62 THOUSAND/ΜL (ref 0.17–1.22)
MONOCYTES NFR BLD AUTO: 6 % (ref 4–12)
NEUTROPHILS # BLD AUTO: 6.75 THOUSANDS/ΜL (ref 1.85–7.62)
NEUTS SEG NFR BLD AUTO: 67 % (ref 43–75)
NONHDLC SERPL-MCNC: 330 MG/DL
NRBC BLD AUTO-RTO: 0 /100 WBCS
PLATELET # BLD AUTO: 314 THOUSANDS/UL (ref 149–390)
PMV BLD AUTO: 9.9 FL (ref 8.9–12.7)
POTASSIUM SERPL-SCNC: 4 MMOL/L (ref 3.5–5.3)
PROT SERPL-MCNC: 7.4 G/DL (ref 6.4–8.2)
RBC # BLD AUTO: 5.48 MILLION/UL (ref 3.81–5.12)
SODIUM SERPL-SCNC: 136 MMOL/L (ref 136–145)
TRIGL SERPL-MCNC: 589 MG/DL
TSH SERPL DL<=0.05 MIU/L-ACNC: 2.99 UIU/ML (ref 0.36–3.74)
WBC # BLD AUTO: 10.08 THOUSAND/UL (ref 4.31–10.16)

## 2020-11-17 PROCEDURE — 84443 ASSAY THYROID STIM HORMONE: CPT

## 2020-11-17 PROCEDURE — 85025 COMPLETE CBC W/AUTO DIFF WBC: CPT

## 2020-11-17 PROCEDURE — 36415 COLL VENOUS BLD VENIPUNCTURE: CPT

## 2020-11-17 PROCEDURE — 83036 HEMOGLOBIN GLYCOSYLATED A1C: CPT

## 2020-11-17 PROCEDURE — 80061 LIPID PANEL: CPT

## 2020-11-17 PROCEDURE — 80053 COMPREHEN METABOLIC PANEL: CPT

## 2020-11-17 RX ORDER — SERTRALINE HYDROCHLORIDE 100 MG/1
TABLET, FILM COATED ORAL
Qty: 30 TABLET | Refills: 0 | Status: SHIPPED | OUTPATIENT
Start: 2020-11-17 | End: 2020-12-02 | Stop reason: DRUGHIGH

## 2020-11-18 LAB
EST. AVERAGE GLUCOSE BLD GHB EST-MCNC: 263 MG/DL
HBA1C MFR BLD: 10.8 %

## 2020-12-02 ENCOUNTER — OFFICE VISIT (OUTPATIENT)
Dept: FAMILY MEDICINE CLINIC | Facility: CLINIC | Age: 60
End: 2020-12-02
Payer: COMMERCIAL

## 2020-12-02 VITALS
SYSTOLIC BLOOD PRESSURE: 192 MMHG | OXYGEN SATURATION: 97 % | HEIGHT: 64 IN | TEMPERATURE: 98.6 F | WEIGHT: 211.2 LBS | BODY MASS INDEX: 36.06 KG/M2 | HEART RATE: 118 BPM | DIASTOLIC BLOOD PRESSURE: 104 MMHG

## 2020-12-02 DIAGNOSIS — IMO0002 UNCONTROLLED TYPE 2 DIABETES WITH NEUROPATHY: Primary | ICD-10-CM

## 2020-12-02 DIAGNOSIS — F32.A DEPRESSION, UNSPECIFIED DEPRESSION TYPE: ICD-10-CM

## 2020-12-02 DIAGNOSIS — Z23 NEED FOR INFLUENZA VACCINATION: ICD-10-CM

## 2020-12-02 DIAGNOSIS — F41.9 ANXIETY: ICD-10-CM

## 2020-12-02 DIAGNOSIS — I10 HYPERTENSION, UNSPECIFIED TYPE: ICD-10-CM

## 2020-12-02 PROCEDURE — 3725F SCREEN DEPRESSION PERFORMED: CPT | Performed by: NURSE PRACTITIONER

## 2020-12-02 PROCEDURE — 90682 RIV4 VACC RECOMBINANT DNA IM: CPT | Performed by: NURSE PRACTITIONER

## 2020-12-02 PROCEDURE — 1036F TOBACCO NON-USER: CPT | Performed by: NURSE PRACTITIONER

## 2020-12-02 PROCEDURE — 4010F ACE/ARB THERAPY RXD/TAKEN: CPT | Performed by: NURSE PRACTITIONER

## 2020-12-02 PROCEDURE — 3077F SYST BP >= 140 MM HG: CPT | Performed by: NURSE PRACTITIONER

## 2020-12-02 PROCEDURE — 90471 IMMUNIZATION ADMIN: CPT | Performed by: NURSE PRACTITIONER

## 2020-12-02 PROCEDURE — 99214 OFFICE O/P EST MOD 30 MIN: CPT | Performed by: NURSE PRACTITIONER

## 2020-12-02 PROCEDURE — 3080F DIAST BP >= 90 MM HG: CPT | Performed by: NURSE PRACTITIONER

## 2020-12-02 PROCEDURE — 3008F BODY MASS INDEX DOCD: CPT | Performed by: NURSE PRACTITIONER

## 2020-12-02 RX ORDER — LISINOPRIL 20 MG/1
20 TABLET ORAL DAILY
Qty: 30 TABLET | Refills: 1 | Status: SHIPPED | OUTPATIENT
Start: 2020-12-02 | End: 2020-12-28 | Stop reason: SDUPTHER

## 2020-12-02 RX ORDER — GABAPENTIN 100 MG/1
100 CAPSULE ORAL 3 TIMES DAILY
Qty: 270 CAPSULE | Refills: 0 | Status: SHIPPED | OUTPATIENT
Start: 2020-12-02 | End: 2021-02-15

## 2020-12-02 RX ORDER — METOPROLOL SUCCINATE 25 MG/1
25 TABLET, EXTENDED RELEASE ORAL DAILY
Qty: 30 TABLET | Refills: 1 | Status: SHIPPED | OUTPATIENT
Start: 2020-12-02 | End: 2020-12-28 | Stop reason: SDUPTHER

## 2020-12-03 DIAGNOSIS — IMO0002 UNCONTROLLED TYPE 2 DIABETES WITH NEUROPATHY: ICD-10-CM

## 2020-12-03 RX ORDER — BLOOD SUGAR DIAGNOSTIC
STRIP MISCELLANEOUS
Qty: 100 EACH | Refills: 1 | Status: SHIPPED | OUTPATIENT
Start: 2020-12-03 | End: 2021-03-19 | Stop reason: SDUPTHER

## 2020-12-11 ENCOUNTER — TELEPHONE (OUTPATIENT)
Dept: FAMILY MEDICINE CLINIC | Facility: CLINIC | Age: 60
End: 2020-12-11

## 2020-12-11 DIAGNOSIS — E78.2 MIXED HYPERLIPIDEMIA: ICD-10-CM

## 2020-12-11 RX ORDER — ATORVASTATIN CALCIUM 40 MG/1
40 TABLET, FILM COATED ORAL DAILY
Qty: 90 TABLET | Refills: 0 | Status: SHIPPED | OUTPATIENT
Start: 2020-12-11 | End: 2021-02-15

## 2020-12-27 DIAGNOSIS — F32.A DEPRESSION, UNSPECIFIED DEPRESSION TYPE: ICD-10-CM

## 2020-12-27 DIAGNOSIS — F41.9 ANXIETY: ICD-10-CM

## 2020-12-27 DIAGNOSIS — I10 HYPERTENSION, UNSPECIFIED TYPE: ICD-10-CM

## 2020-12-28 ENCOUNTER — OFFICE VISIT (OUTPATIENT)
Dept: FAMILY MEDICINE CLINIC | Facility: CLINIC | Age: 60
End: 2020-12-28
Payer: COMMERCIAL

## 2020-12-28 VITALS
DIASTOLIC BLOOD PRESSURE: 92 MMHG | HEART RATE: 77 BPM | BODY MASS INDEX: 36.4 KG/M2 | SYSTOLIC BLOOD PRESSURE: 156 MMHG | OXYGEN SATURATION: 97 % | TEMPERATURE: 97.2 F | HEIGHT: 64 IN | WEIGHT: 213.2 LBS

## 2020-12-28 DIAGNOSIS — F32.A DEPRESSION, UNSPECIFIED DEPRESSION TYPE: ICD-10-CM

## 2020-12-28 DIAGNOSIS — IMO0002 UNCONTROLLED TYPE 2 DIABETES WITH NEUROPATHY: Primary | ICD-10-CM

## 2020-12-28 DIAGNOSIS — I10 HYPERTENSION, UNSPECIFIED TYPE: ICD-10-CM

## 2020-12-28 DIAGNOSIS — F41.9 ANXIETY: ICD-10-CM

## 2020-12-28 DIAGNOSIS — E78.2 MIXED HYPERLIPIDEMIA: ICD-10-CM

## 2020-12-28 DIAGNOSIS — R25.1 TREMOR OF BOTH HANDS: ICD-10-CM

## 2020-12-28 PROCEDURE — 99214 OFFICE O/P EST MOD 30 MIN: CPT | Performed by: NURSE PRACTITIONER

## 2020-12-28 PROCEDURE — 3080F DIAST BP >= 90 MM HG: CPT | Performed by: NURSE PRACTITIONER

## 2020-12-28 PROCEDURE — 3077F SYST BP >= 140 MM HG: CPT | Performed by: NURSE PRACTITIONER

## 2020-12-28 PROCEDURE — 3008F BODY MASS INDEX DOCD: CPT | Performed by: NURSE PRACTITIONER

## 2020-12-28 PROCEDURE — 1036F TOBACCO NON-USER: CPT | Performed by: NURSE PRACTITIONER

## 2020-12-28 PROCEDURE — 4010F ACE/ARB THERAPY RXD/TAKEN: CPT | Performed by: NURSE PRACTITIONER

## 2020-12-28 RX ORDER — LISINOPRIL 20 MG/1
20 TABLET ORAL DAILY
Qty: 90 TABLET | Refills: 0 | Status: SHIPPED | OUTPATIENT
Start: 2020-12-28 | End: 2021-03-08 | Stop reason: SDUPTHER

## 2020-12-28 RX ORDER — LISINOPRIL 10 MG/1
10 TABLET ORAL DAILY
Qty: 90 TABLET | Refills: 0 | Status: SHIPPED | OUTPATIENT
Start: 2020-12-28 | End: 2021-03-08 | Stop reason: SDUPTHER

## 2020-12-28 RX ORDER — LISINOPRIL 20 MG/1
TABLET ORAL
Qty: 30 TABLET | Refills: 1 | OUTPATIENT
Start: 2020-12-28

## 2020-12-28 RX ORDER — METOPROLOL SUCCINATE 25 MG/1
25 TABLET, EXTENDED RELEASE ORAL DAILY
Qty: 90 TABLET | Refills: 0 | Status: SHIPPED | OUTPATIENT
Start: 2020-12-28 | End: 2021-03-08 | Stop reason: SDUPTHER

## 2020-12-28 RX ORDER — METOPROLOL SUCCINATE 25 MG/1
TABLET, EXTENDED RELEASE ORAL
Qty: 30 TABLET | Refills: 1 | OUTPATIENT
Start: 2020-12-28

## 2021-01-25 PROBLEM — E11.3391: Status: ACTIVE | Noted: 2021-01-25

## 2021-02-12 DIAGNOSIS — E78.2 MIXED HYPERLIPIDEMIA: ICD-10-CM

## 2021-02-12 DIAGNOSIS — IMO0002 UNCONTROLLED TYPE 2 DIABETES WITH NEUROPATHY: ICD-10-CM

## 2021-02-15 RX ORDER — ATORVASTATIN CALCIUM 40 MG/1
TABLET, FILM COATED ORAL
Qty: 30 TABLET | Refills: 0 | Status: SHIPPED | OUTPATIENT
Start: 2021-02-15 | End: 2021-03-19 | Stop reason: SDUPTHER

## 2021-02-15 RX ORDER — GABAPENTIN 100 MG/1
CAPSULE ORAL
Qty: 90 CAPSULE | Refills: 0 | Status: SHIPPED | OUTPATIENT
Start: 2021-02-15 | End: 2021-03-19 | Stop reason: SDUPTHER

## 2021-03-08 DIAGNOSIS — F41.9 ANXIETY: ICD-10-CM

## 2021-03-08 DIAGNOSIS — F32.A DEPRESSION, UNSPECIFIED DEPRESSION TYPE: ICD-10-CM

## 2021-03-08 DIAGNOSIS — I10 HYPERTENSION, UNSPECIFIED TYPE: ICD-10-CM

## 2021-03-08 DIAGNOSIS — IMO0002 UNCONTROLLED TYPE 2 DIABETES WITH NEUROPATHY: ICD-10-CM

## 2021-03-09 RX ORDER — METOPROLOL SUCCINATE 25 MG/1
25 TABLET, EXTENDED RELEASE ORAL DAILY
Qty: 30 TABLET | Refills: 0 | Status: SHIPPED | OUTPATIENT
Start: 2021-03-09 | End: 2021-03-19 | Stop reason: SDUPTHER

## 2021-03-09 RX ORDER — LISINOPRIL 20 MG/1
20 TABLET ORAL DAILY
Qty: 30 TABLET | Refills: 0 | Status: SHIPPED | OUTPATIENT
Start: 2021-03-09 | End: 2021-03-19 | Stop reason: DRUGHIGH

## 2021-03-09 RX ORDER — LISINOPRIL 10 MG/1
10 TABLET ORAL DAILY
Qty: 30 TABLET | Refills: 0 | Status: SHIPPED | OUTPATIENT
Start: 2021-03-09 | End: 2021-03-19 | Stop reason: DRUGHIGH

## 2021-03-10 DIAGNOSIS — Z23 ENCOUNTER FOR IMMUNIZATION: ICD-10-CM

## 2021-03-10 NOTE — TELEPHONE ENCOUNTER
pls call pt  Schedule follow up (2 slots)  Appears pt has not followed up with Endo as recommended  Have her bring her glucometer with her to appt   30 days medication refills sent to pharmacy to hold until seen

## 2021-03-19 ENCOUNTER — OFFICE VISIT (OUTPATIENT)
Dept: FAMILY MEDICINE CLINIC | Facility: CLINIC | Age: 61
End: 2021-03-19
Payer: COMMERCIAL

## 2021-03-19 VITALS
DIASTOLIC BLOOD PRESSURE: 98 MMHG | HEART RATE: 102 BPM | HEIGHT: 64 IN | RESPIRATION RATE: 17 BRPM | TEMPERATURE: 98 F | SYSTOLIC BLOOD PRESSURE: 176 MMHG | WEIGHT: 211.8 LBS | BODY MASS INDEX: 36.16 KG/M2 | OXYGEN SATURATION: 96 %

## 2021-03-19 DIAGNOSIS — F41.9 ANXIETY: ICD-10-CM

## 2021-03-19 DIAGNOSIS — F32.A DEPRESSION, UNSPECIFIED DEPRESSION TYPE: ICD-10-CM

## 2021-03-19 DIAGNOSIS — IMO0002 UNCONTROLLED TYPE 2 DIABETES WITH NEUROPATHY: Primary | ICD-10-CM

## 2021-03-19 DIAGNOSIS — I10 HYPERTENSION, UNSPECIFIED TYPE: ICD-10-CM

## 2021-03-19 DIAGNOSIS — E78.2 MIXED HYPERLIPIDEMIA: ICD-10-CM

## 2021-03-19 LAB — SL AMB POCT HEMOGLOBIN AIC: 13.1 (ref ?–6.5)

## 2021-03-19 PROCEDURE — 99214 OFFICE O/P EST MOD 30 MIN: CPT | Performed by: NURSE PRACTITIONER

## 2021-03-19 PROCEDURE — 1036F TOBACCO NON-USER: CPT | Performed by: NURSE PRACTITIONER

## 2021-03-19 PROCEDURE — 83036 HEMOGLOBIN GLYCOSYLATED A1C: CPT | Performed by: NURSE PRACTITIONER

## 2021-03-19 PROCEDURE — 4010F ACE/ARB THERAPY RXD/TAKEN: CPT | Performed by: NURSE PRACTITIONER

## 2021-03-19 RX ORDER — METOPROLOL SUCCINATE 25 MG/1
25 TABLET, EXTENDED RELEASE ORAL DAILY
Qty: 90 TABLET | Refills: 0 | Status: SHIPPED | OUTPATIENT
Start: 2021-03-19 | End: 2021-05-27 | Stop reason: SDUPTHER

## 2021-03-19 RX ORDER — GABAPENTIN 100 MG/1
100 CAPSULE ORAL 3 TIMES DAILY
Qty: 90 CAPSULE | Refills: 0 | Status: SHIPPED | OUTPATIENT
Start: 2021-03-19 | End: 2021-05-27 | Stop reason: SDUPTHER

## 2021-03-19 RX ORDER — LISINOPRIL 40 MG/1
40 TABLET ORAL DAILY
Qty: 90 TABLET | Refills: 0 | Status: SHIPPED | OUTPATIENT
Start: 2021-03-19 | End: 2021-06-17

## 2021-03-19 RX ORDER — LISINOPRIL 20 MG/1
20 TABLET ORAL DAILY
Qty: 90 TABLET | Refills: 0 | Status: CANCELLED | OUTPATIENT
Start: 2021-03-19

## 2021-03-19 RX ORDER — ATORVASTATIN CALCIUM 40 MG/1
40 TABLET, FILM COATED ORAL DAILY
Qty: 90 TABLET | Refills: 0 | Status: SHIPPED | OUTPATIENT
Start: 2021-03-19 | End: 2021-06-17

## 2021-03-19 RX ORDER — BLOOD SUGAR DIAGNOSTIC
STRIP MISCELLANEOUS
Qty: 100 EACH | Refills: 3 | Status: SHIPPED | OUTPATIENT
Start: 2021-03-19 | End: 2022-04-15 | Stop reason: SDUPTHER

## 2021-03-19 RX ORDER — LISINOPRIL 10 MG/1
10 TABLET ORAL DAILY
Qty: 90 TABLET | Refills: 0 | Status: CANCELLED | OUTPATIENT
Start: 2021-03-19

## 2021-03-22 ENCOUNTER — TELEPHONE (OUTPATIENT)
Dept: FAMILY MEDICINE CLINIC | Facility: CLINIC | Age: 61
End: 2021-03-22

## 2021-03-22 NOTE — TELEPHONE ENCOUNTER
Pt called back spoke with faviola I called pt back to see if she wanted earlier appt in [facility] pt said no will keep 5/3/21 appt

## 2021-03-22 NOTE — TELEPHONE ENCOUNTER
Breanne Marquis called to let us know there is an opening 3/31/21 @ 330p in [address] [place] [address] called pt left msg asking pt

## 2021-03-23 ENCOUNTER — TELEPHONE (OUTPATIENT)
Dept: FAMILY MEDICINE CLINIC | Facility: CLINIC | Age: 61
End: 2021-03-23

## 2021-03-23 NOTE — TELEPHONE ENCOUNTER
Pt called fell out in the street has cut on cheek bone and breast they stopped bleeding but pt feels cuts are deep and may need stitches   spoke with agueda advised er for possible scan of face to make sure no fracture pt aware

## 2021-03-24 ENCOUNTER — TELEPHONE (OUTPATIENT)
Dept: ENDOCRINOLOGY | Facility: CLINIC | Age: 61
End: 2021-03-24

## 2021-03-24 NOTE — TELEPHONE ENCOUNTER
Scheduled patient for 3- with dr Anayeli Delatorre and was cx     I called pateint and left message to reschedule appt

## 2021-03-25 ENCOUNTER — TELEPHONE (OUTPATIENT)
Dept: FAMILY MEDICINE CLINIC | Facility: CLINIC | Age: 61
End: 2021-03-25

## 2021-03-25 ENCOUNTER — TRANSITIONAL CARE MANAGEMENT (OUTPATIENT)
Dept: FAMILY MEDICINE CLINIC | Facility: CLINIC | Age: 61
End: 2021-03-25

## 2021-03-25 NOTE — TELEPHONE ENCOUNTER
----- Message from Wilbert Orantes MA sent at 3/25/2021 12:06 PM EDT -----  Regarding: TCM  LMOM to schedule TCM d/c 3/24/21 [facility] fall, head injury, uncontrolled diabetes  Need to schedule by 4/6/21

## 2021-03-29 NOTE — PROGRESS NOTES
[facility]    ASSESSMENT AND PLAN     1  Uncontrolled type 2 diabetes with neuropathy ([address] )    Admits to medication noncompliance  Discussed importance of obtaining and maintaining good glycemic control  Discussed recent fall likely secondary to hyperglycemia  In office hemoglobin A1c today: 13 1  Patient admits to not following up with endocrinology  As we discussed last office visit  Will have staff assist in scheduling next available  Agreeable to continuing Lantus, but declines other medications at this time  Hopeful endocrinology will be more persuasive  Rx refilled today for Neurontin: Diabetic neuropathy  Discussed this may also contribute to her falls  Encouraged well-fitting, hard soled shoes for balance and stability  Continue to encourage routine blood sugar checks at home  Discussed again the immediate and long-term complications of uncontrolled diabetes  Patient states understanding of same  Return 1 month for close follow-up  Return sooner if needed    - Insulin Pen Needle (Pen Needles) 32G X 5 MM MISC; Use with insulin four times daily  Dispense: 100 each; Refill: 3  - gabapentin (NEURONTIN) 100 mg capsule; Take 1 capsule (100 mg total) by mouth 3 (three) times a day  Dispense: 90 capsule; Refill: 0  - Comprehensive metabolic panel; Future  - POCT hemoglobin A1c    2  Mixed hyperlipidemia   total cholesterol and triglycerides significantly elevated when last assessed in November  Overdue for repeat lab work  Has been non compliant with atorvastatin  Restarted 40 today  Obtain fasting lab work, and dose adjust if indicated  Reviewed with patient risks of elevated cholesterol, especially in light of her other comorbidity    - atorvastatin (LIPITOR) 40 mg tablet; Take 1 tablet (40 mg total) by mouth daily  Dispense: 90 tablet; Refill: 0  - Lipid panel; Future    3  Hypertension, unspecified type   admits to noncompliance with blood pressure medications    BP significantly elevated today  Discussed risks of same  Denies any chest pain / pressure tightness  Agreeable to restart medications  Refilled today  Strongly encourage monitoring home blood pressures  Discussed elevated blood pressure could have also contributed to her fall several weeks ago    - metoprolol succinate (TOPROL-XL) 25 mg 24 hr tablet; Take 1 tablet (25 mg total) by mouth daily  Dispense: 90 tablet; Refill: 0  - Comprehensive metabolic panel; Future  - lisinopril (ZESTRIL) 40 mg tablet; Take 1 tablet (40 mg total) by mouth daily  Dispense: 90 tablet; Refill: 0    4  Anxiety    Discussed recent increase in depression  Has been compliant with sertraline 50  Will increase today to 75  Discussed talk therapy  Will revisit at follow-up  She is to return in 1 month  Although she denies any suicidal / homicidal ideation, recommend keeping crisis phone number readily available  - sertraline (ZOLOFT) 50 mg tablet; Take 1 5 tablets (75 mg total) by mouth daily  Dispense: 135 tablet; Refill: 0    5  Depression, unspecified depression type    - sertraline (ZOLOFT) 50 mg tablet; Take 1 5 tablets (75 mg total) by mouth daily  Dispense: 135 tablet; Refill: 0            SUBJECTIVE       Patient ID: Ami Coello is a 62 y o  female  Chief Complaint   Patient presents with    Follow-up     DM & Med Check       HISTORY OF PRESENT ILLNESS     Patient presents today for follow-up  Overdue  Admits to medication noncompliance  Ran out of her Lantus pens at some point since her last visit  She could not remember what dose she was taking, so she restarted it at 35 units/HS  States she has been taking it at that dose for the last several weeks  Has not been checking her blood sugars at home routinely  Will periodically check and they range 3/400  Does not get out of the house much  States she is more than content to sit at home and just watch TV  Her daughter feels she should be getting out and socializing more  She does feel her depression may be worsening, but denies any suicidal and/or homicidal ideation  Admits to not taking her blood pressure medication nor checking her blood pressure at home  States she had a fall at home a few weeks ago  She felt unsteady on her feet  Denies hitting her head  Denies any loss of consciousness  The following portions of the patient's history were reviewed and updated as appropriate: allergies, current medications, past family history, past medical history, past social history, past surgical history and problem list     REVIEW OF SYSTEMS  Review of Systems   Constitutional: Negative  Respiratory: Negative  Cardiovascular: Negative  Gastrointestinal: Negative  Genitourinary: Negative  Neurological: Negative  Psychiatric/Behavioral: Positive for dysphoric mood and sleep disturbance  Negative for self-injury and suicidal ideas  The patient is nervous/anxious          OBJECTIVE      VITAL SIGNS  BP (!) 176/98 (BP Location: Left arm, Patient Position: Sitting, Cuff Size: Standard)   Pulse 102   Temp 98 °F (36 7 °C) (Tympanic)   Resp 17   Ht 5' 3 78" (1 62 m)   Wt 96 1 kg (211 lb 12 8 oz)   SpO2 96%   BMI 36 61 kg/m²     CURRENT MEDICATIONS    Current Outpatient Medications:     aspirin 81 mg chewable tablet, Chew 81 mg, Disp: , Rfl:     atorvastatin (LIPITOR) 40 mg tablet, Take 1 tablet (40 mg total) by mouth daily, Disp: 90 tablet, Rfl: 0    gabapentin (NEURONTIN) 100 mg capsule, Take 1 capsule (100 mg total) by mouth 3 (three) times a day, Disp: 90 capsule, Rfl: 0    insulin glargine (LANTUS SOLOSTAR) 100 units/mL injection pen, Inject 20 Units under the skin daily at bedtime Increase by 1 unit daily for fasting glucose over 150, Disp: 6 mL, Rfl: 0    Insulin Pen Needle (Pen Needles) 32G X 5 MM MISC, Use with insulin four times daily, Disp: 100 each, Rfl: 3    metoprolol succinate (TOPROL-XL) 25 mg 24 hr tablet, Take 1 tablet (25 mg total) by mouth daily, Disp: 90 tablet, Rfl: 0    lisinopril (ZESTRIL) 40 mg tablet, Take 1 tablet (40 mg total) by mouth daily, Disp: 90 tablet, Rfl: 0    sertraline (ZOLOFT) 50 mg tablet, Take 1 5 tablets (75 mg total) by mouth daily, Disp: 135 tablet, Rfl: 0      PHYSICAL EXAMINATION   Physical Exam  Vitals signs and nursing note reviewed  Constitutional:       General: She is not in acute distress  Appearance: Normal appearance  She is not ill-appearing  Neck:      Vascular: No carotid bruit  Cardiovascular:      Rate and Rhythm: Normal rate and regular rhythm  Pulmonary:      Effort: Pulmonary effort is normal  No respiratory distress  Breath sounds: Normal breath sounds and air entry  Musculoskeletal:      Right lower leg: No edema  Left lower leg: No edema  Lymphadenopathy:      Cervical: No cervical adenopathy  Skin:     General: Skin is warm and dry  Neurological:      Mental Status: She is alert and oriented to person, place, and time     Psychiatric:         Attention and Perception: Attention normal          Mood and Affect: Mood normal          Speech: Speech normal          Behavior: Behavior normal

## 2021-03-31 RX ORDER — AMLODIPINE BESYLATE 5 MG/1
5 TABLET ORAL DAILY
COMMUNITY
Start: 2021-03-24 | End: 2021-04-01 | Stop reason: DRUGHIGH

## 2021-03-31 RX ORDER — INSULIN ASPART 100 [IU]/ML
12 INJECTION, SOLUTION INTRAVENOUS; SUBCUTANEOUS
COMMUNITY
Start: 2021-03-24 | End: 2021-08-25 | Stop reason: SDUPTHER

## 2021-03-31 RX ORDER — CEPHALEXIN 500 MG/1
500 CAPSULE ORAL 2 TIMES DAILY
COMMUNITY
Start: 2021-03-25 | End: 2021-04-01

## 2021-04-01 ENCOUNTER — OFFICE VISIT (OUTPATIENT)
Dept: FAMILY MEDICINE CLINIC | Facility: CLINIC | Age: 61
End: 2021-04-01
Payer: COMMERCIAL

## 2021-04-01 VITALS
SYSTOLIC BLOOD PRESSURE: 172 MMHG | HEIGHT: 64 IN | RESPIRATION RATE: 17 BRPM | DIASTOLIC BLOOD PRESSURE: 90 MMHG | OXYGEN SATURATION: 98 % | BODY MASS INDEX: 36.74 KG/M2 | WEIGHT: 215.2 LBS | HEART RATE: 84 BPM | TEMPERATURE: 97.5 F

## 2021-04-01 DIAGNOSIS — S00.81XA ABRASION, FACE W/O INFECTION: ICD-10-CM

## 2021-04-01 DIAGNOSIS — F41.9 ANXIETY: ICD-10-CM

## 2021-04-01 DIAGNOSIS — I10 BENIGN ESSENTIAL HYPERTENSION: Primary | ICD-10-CM

## 2021-04-01 DIAGNOSIS — R94.31 ABNORMAL EKG: ICD-10-CM

## 2021-04-01 DIAGNOSIS — F41.0 PANIC ATTACK: ICD-10-CM

## 2021-04-01 DIAGNOSIS — IMO0002 UNCONTROLLED TYPE 2 DIABETES WITH NEUROPATHY: ICD-10-CM

## 2021-04-01 DIAGNOSIS — F32.A DEPRESSION, UNSPECIFIED DEPRESSION TYPE: ICD-10-CM

## 2021-04-01 PROCEDURE — 99214 OFFICE O/P EST MOD 30 MIN: CPT | Performed by: NURSE PRACTITIONER

## 2021-04-01 RX ORDER — AMLODIPINE BESYLATE 10 MG/1
10 TABLET ORAL DAILY
Qty: 90 TABLET | Refills: 0 | Status: SHIPPED | OUTPATIENT
Start: 2021-04-01 | End: 2021-06-26

## 2021-04-01 RX ORDER — ALPRAZOLAM 0.25 MG/1
0.25 TABLET ORAL
Qty: 10 TABLET | Refills: 0 | Status: SHIPPED | OUTPATIENT
Start: 2021-04-01 | End: 2021-05-27 | Stop reason: SDUPTHER

## 2021-04-01 RX ORDER — AMLODIPINE BESYLATE 5 MG/1
5 TABLET ORAL DAILY
Qty: 30 TABLET | Refills: 11 | Status: CANCELLED | OUTPATIENT
Start: 2021-04-01 | End: 2022-04-01

## 2021-04-01 NOTE — PROGRESS NOTES
[facility]    ASSESSMENT AND PLAN     1  Benign essential hypertension    Presents for [facility]  Hospital records and testing reviewed  Discussed with patient today  BP still suboptimal   Home averaging 170-190 over 90s  In office today 172/88  States compliant with lisinopril 40 and amlodipine 5  Will increase amlodipine to 10 today  Continue home pressures  She will contact me in 2 weeks and let me know what her pressures are running (home cuff has been verified)  Discussed will likely add hydrochlorothiazide at that time  Patient agreeable with plan    - amLODIPine (NORVASC) 10 mg tablet; Take 1 tablet (10 mg total) by mouth daily  Dispense: 90 tablet; Refill: 0    2  Uncontrolled type 2 diabetes with neuropathy (HCC)   Lantus at 44 units daily  Continues with NovoLog 12 units pre meals  Has follow-up with Endo on May 6th  States she does note a difference since her blood sugars are better controlled  They have been averaging in the low 200s  Occasionally higher yet  States she feels better  Decreased in thirst and urination  Less brain fog    3  Depression, unspecified depression type   doing okay on sertraline 75  Mood slowly improving as she starting to physically feel better  Occasional anxiety attacks at night  Discussed below  - sertraline (ZOLOFT) 50 mg tablet; Take 1 5 tablets (75 mg total) by mouth daily  Dispense: 135 tablet; Refill: 0    4  Anxiety    - sertraline (ZOLOFT) 50 mg tablet; Take 1 5 tablets (75 mg total) by mouth daily  Dispense: 135 tablet; Refill: 0    5  Abnormal EKG      EKG abnormal in hospital  3/24    NORMAL SINUS RHYTHM  NONSPECIFIC ST AND T WAVE ABNORMALITY  PROLONGED QT  ABNORMAL ECG  WHEN COMPARED WITH ECG OF 15-DEC-2015 05:55,  INVERTED T WAVES HAVE REPLACED NONSPECIFIC T WAVE ABNORMALITY IN LATERAL LEADS      Patient resistant to further testing at this time, but is agreeable to Cardiology referral/evaluation  Placed today      - Ambulatory referral to Cardiology; Future    6  Panic attack    Occasional panic attacks  Most prevalent at night  Used to take Xanax 0 5 mg  Discussed risk of dependence with benzos  Additionally discussed risk of falls  Recent fall was mechanical   Patient states dizziness and brain fog improving since sugars better controlled  Will give small dose Xanax - 0 25 mg- 10 pills only for extreme panic attacks  Advised to break in half to use least amount necessary for relief  Note patient does have railings and grab bars in her home  She lives in her mother's home and it has been modified for safety    - ALPRAZolam (XANAX) 0 25 mg tablet; Take 1 tablet (0 25 mg total) by mouth daily at bedtime as needed for anxiety  Dispense: 10 tablet; Refill: 0    7  Abrasion, face W/O infection   abraded areas appear to be healing well  Scabbing and shaded bruising noted  No signs of infection  May keep JED  Monitor        SUBJECTIVE       Patient ID: Yolie Rebolledo is a 62 y o  female  Chief Complaint   Patient presents with    Transition of Care Management     Fall, Head Inj, & Hyperglycemia 3/23-3/24 @ [address]    Presents today TCM s/p hospitalization [facility] 3/23-24  S/P mechanical fall at home  Vernard Fix while carrying her garbage can in from the road  Can rolled away while holding it and twisted in her hand, causing her to loose balance and fall  Hit left side of face/head, causing bruising and laceration  Mild abrasions right hand and wrist  (All superficial, requiring no suturing)  No LOC  Called daughter and presented to ER  Held overnight due to elevated BP and blood sugar  Note pt recently restarted all medications after abruptly self stopping a few months ago  She was evaluated by endo while inpatient  She was started on Novolin 12units, 3 times day with meals  Blood sugars have been averaging 200s  She does note a difference  Feels better   Additionally increased Lisinopril for total 40 and added Amlodipine 5  Home pressures are still averaging 170s/80-90s  Occasional headaches, but denies any chest pain/pressure/SOB/palpiatiaotns  Last, she was started yesterday on Keflex for UTI        The following portions of the patient's history were reviewed and updated as appropriate: allergies, current medications, past family history, past medical history, past social history, past surgical history and problem list     REVIEW OF SYSTEMS  Review of Systems   Constitutional: Negative  HENT: Negative  Respiratory: Negative  Cardiovascular: Negative  Gastrointestinal: Negative  Endocrine: Negative for polydipsia, polyphagia and polyuria  Genitourinary: Negative  Urinary symptoms improving  Currently treating for UTI  Voiding less since sugars controlled   Neurological: Negative for tremors (improving), syncope and weakness  Dizziness: improved  Headaches: occasional    Psychiatric/Behavioral: Positive for sleep disturbance  Negative for decreased concentration, dysphoric mood, self-injury and suicidal ideas  The patient is nervous/anxious  TCM Call (since 3/1/2021)     Date and time call was made  3/31/2021  2:44 PM    Hospital care reviewed  Records reviewed    Patient was hospitialized at  [address] [place] [address] Dr Godinez  Hemoglobin A1C 12 9, Initial glucose at hospital 410    Date of Admission  03/23/21    Date of discharge  03/24/21    Diagnosis  Fall, head injury, uncontrolled diabetes    Disposition  Home    Were the patients medications reviewed and updated  Yes    Current Symptoms  None (Comment)  brusing on face from fall      TCM Call (since 3/1/2021)     Post hospital issues  None    Should patient be enrolled in anticoag monitoring? No    Scheduled for follow up?   Yes    Patients specialists  Endocrinologist    Did you obtain your prescribed medications  Yes    Do you need help managing your prescriptions or medications  No    Is transportation to your appointment needed  Yes    I have advised the patient to call PCP with any new or worsening symptoms  Ortega Forman MA    [address]   Family    The type of support provided  Emotional; Physical    Do you have social support  Yes, as much as I need    Are you recieving any outpatient services  No    Are you recieving home care services  No    Are you using any community resources  No    Current waiver services  No    Have you fallen in the last 12 months  Yes    How many times  3 times    Interperter language line needed  No          OBJECTIVE      VITAL SIGNS  BP (!) 172/90 (BP Location: Left arm, Patient Position: Sitting, Cuff Size: Large)   Pulse 84   Temp 97 5 °F (36 4 °C) (Tympanic)   Resp 17   Ht 5' 3 78" (1 62 m)   Wt 97 6 kg (215 lb 3 2 oz)   SpO2 98%   BMI 37 19 kg/m²     CURRENT MEDICATIONS    Current Outpatient Medications:     aspirin 81 mg chewable tablet, Chew 81 mg, Disp: , Rfl:     atorvastatin (LIPITOR) 40 mg tablet, Take 1 tablet (40 mg total) by mouth daily, Disp: 90 tablet, Rfl: 0    cephalexin (KEFLEX) 500 mg capsule, Take 500 mg by mouth 2 (two) times a day, Disp: , Rfl:     gabapentin (NEURONTIN) 100 mg capsule, Take 1 capsule (100 mg total) by mouth 3 (three) times a day, Disp: 90 capsule, Rfl: 0    insulin aspart (NovoLOG FlexPen) 100 UNIT/ML injection pen, Inject 12 Units under the skin 3 (three) times a day with meals , Disp: , Rfl:     insulin glargine (LANTUS SOLOSTAR) 100 units/mL injection pen, Inject 44 Units under the skin daily, Disp: , Rfl:     Insulin Pen Needle (Pen Needles) 32G X 5 MM MISC, Use with insulin four times daily, Disp: 100 each, Rfl: 3    lisinopril (ZESTRIL) 40 mg tablet, Take 1 tablet (40 mg total) by mouth daily, Disp: 90 tablet, Rfl: 0    metoprolol succinate (TOPROL-XL) 25 mg 24 hr tablet, Take 1 tablet (25 mg total) by mouth daily, Disp: 90 tablet, Rfl: 0    sertraline (ZOLOFT) 50 mg tablet, Take 1 5 tablets (75 mg total) by mouth daily, Disp: 135 tablet, Rfl: 0    ALPRAZolam (XANAX) 0 25 mg tablet, Take 1 tablet (0 25 mg total) by mouth daily at bedtime as needed for anxiety, Disp: 10 tablet, Rfl: 0    amLODIPine (NORVASC) 10 mg tablet, Take 1 tablet (10 mg total) by mouth daily, Disp: 90 tablet, Rfl: 0    insulin glargine (LANTUS SOLOSTAR) 100 units/mL injection pen, Inject 20 Units under the skin daily at bedtime Increase by 1 unit daily for fasting glucose over 150 (Patient not taking: Reported on 4/1/2021), Disp: 6 mL, Rfl: 0      PHYSICAL EXAMINATION   Physical Exam  Vitals signs and nursing note reviewed  Constitutional:       General: She is not in acute distress  Appearance: Normal appearance  She is well-developed and well-groomed  She is not ill-appearing  HENT:      Head: Normocephalic and atraumatic  Comments: Scabbing noted, left frontal region  Shaded bruising under both eyes  Few scabs right forearm  Appears to be healing well  No sign infection  Right Ear: Tympanic membrane, ear canal and external ear normal       Left Ear: Tympanic membrane, ear canal and external ear normal       Nose: Nose normal       Mouth/Throat:      Dentition: Has dentures  Eyes:      Conjunctiva/sclera:      Left eye: Hemorrhage (healing) present  Pupils: Pupils are equal, round, and reactive to light  Neck:      Vascular: No carotid bruit  Cardiovascular:      Rate and Rhythm: Normal rate and regular rhythm  Heart sounds: Murmur present  Pulmonary:      Effort: Pulmonary effort is normal  No respiratory distress  Breath sounds: Normal breath sounds and air entry  Musculoskeletal:      Right lower leg: No edema  Left lower leg: No edema  Lymphadenopathy:      Cervical: No cervical adenopathy  Skin:     General: Skin is warm and dry  Neurological:      Mental Status: She is alert and oriented to person, place, and time  Motor: No tremor  Gait: Gait normal    Psychiatric:         Attention and Perception: Attention normal          Mood and Affect: Mood normal          Speech: Speech normal          Behavior: Behavior normal          Thought Content:  Thought content normal          Cognition and Memory: Cognition normal          Judgment: Judgment normal

## 2021-04-29 DIAGNOSIS — F41.0 PANIC ATTACK: ICD-10-CM

## 2021-04-29 DIAGNOSIS — IMO0002 UNCONTROLLED TYPE 2 DIABETES WITH NEUROPATHY: ICD-10-CM

## 2021-04-29 RX ORDER — INSULIN GLARGINE 100 [IU]/ML
INJECTION, SOLUTION SUBCUTANEOUS
Qty: 15 ML | OUTPATIENT
Start: 2021-04-29

## 2021-04-29 RX ORDER — ALPRAZOLAM 0.25 MG/1
TABLET ORAL
Qty: 10 TABLET | Refills: 0 | OUTPATIENT
Start: 2021-04-29

## 2021-04-30 ENCOUNTER — RA CDI HCC (OUTPATIENT)
Dept: OTHER | Facility: HOSPITAL | Age: 61
End: 2021-04-30

## 2021-04-30 NOTE — PROGRESS NOTES
[facility]  coding opportunities             Chart reviewed, (number of) suggestions sent to provider: 6     Problem listed updated  Provider Accepted, (number of) suggestions accepted: 0        Patients insurance company: [employer] (Medicare Advantage and Commercial)     Visit status: Patient canceled the appointment     Provider never responded to [facility]  coding request     [facility]  coding opportunities             Chart reviewed, (number of) suggestions sent to provider: 6           Patients insurance company: [employer] [address] [place] [address] (Medicare Advantage and Commercial)     Found on active problem list:  1) I63 9 Cerebral infarction ([facility] )  ----Please assess current status; From coding standpoint, if not an active problem, the following code may be appropriate:  Z86 73: Personal history of transient ischemic attack, and cerebral infarction without residual deficits       2)F32 9 Depression: found on active problem list, Can Depression be further classified    3) as per medication list, following code may be appropriate  Z79 4 Long term (current) use of insulin ([facility] )    4) E66 01- Morbid (severe) obesity due to excess calories ([facility] )   5) Z68  28- Z68 --  - Body mass index (BMI), adult  (Pick one from the Z68 35 - H00 --)           Per CMS/ICD 10 coding guidelines, to be used when BMI > 35 & <40 with one or more comorbidity (DM, HTN, or BENITO    Found on active problem list:please assess for 2021 billing  6) E11 319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 ([facility] ) -

## 2021-05-06 ENCOUNTER — TELEPHONE (OUTPATIENT)
Dept: OTHER | Facility: OTHER | Age: 61
End: 2021-05-06

## 2021-05-27 ENCOUNTER — OFFICE VISIT (OUTPATIENT)
Dept: FAMILY MEDICINE CLINIC | Facility: CLINIC | Age: 61
End: 2021-05-27
Payer: COMMERCIAL

## 2021-05-27 VITALS
WEIGHT: 209.8 LBS | DIASTOLIC BLOOD PRESSURE: 70 MMHG | HEART RATE: 89 BPM | SYSTOLIC BLOOD PRESSURE: 132 MMHG | HEIGHT: 64 IN | OXYGEN SATURATION: 98 % | BODY MASS INDEX: 35.82 KG/M2 | RESPIRATION RATE: 17 BRPM | TEMPERATURE: 97.5 F

## 2021-05-27 DIAGNOSIS — E78.2 MIXED HYPERLIPIDEMIA: ICD-10-CM

## 2021-05-27 DIAGNOSIS — F41.0 PANIC ATTACK: ICD-10-CM

## 2021-05-27 DIAGNOSIS — Z13.29 SCREENING FOR THYROID DISORDER: ICD-10-CM

## 2021-05-27 DIAGNOSIS — F41.9 ANXIETY: ICD-10-CM

## 2021-05-27 DIAGNOSIS — F32.A DEPRESSION, UNSPECIFIED DEPRESSION TYPE: ICD-10-CM

## 2021-05-27 DIAGNOSIS — I10 HYPERTENSION, UNSPECIFIED TYPE: Primary | ICD-10-CM

## 2021-05-27 DIAGNOSIS — IMO0002 UNCONTROLLED TYPE 2 DIABETES WITH NEUROPATHY: ICD-10-CM

## 2021-05-27 PROCEDURE — 3078F DIAST BP <80 MM HG: CPT | Performed by: NURSE PRACTITIONER

## 2021-05-27 PROCEDURE — 3008F BODY MASS INDEX DOCD: CPT | Performed by: NURSE PRACTITIONER

## 2021-05-27 PROCEDURE — 99214 OFFICE O/P EST MOD 30 MIN: CPT | Performed by: NURSE PRACTITIONER

## 2021-05-27 PROCEDURE — 1036F TOBACCO NON-USER: CPT | Performed by: NURSE PRACTITIONER

## 2021-05-27 PROCEDURE — 3075F SYST BP GE 130 - 139MM HG: CPT | Performed by: NURSE PRACTITIONER

## 2021-05-27 RX ORDER — GABAPENTIN 100 MG/1
100 CAPSULE ORAL 3 TIMES DAILY
Qty: 90 CAPSULE | Refills: 0 | Status: SHIPPED | OUTPATIENT
Start: 2021-05-27 | End: 2021-07-19

## 2021-05-27 RX ORDER — LANCETS 23 GAUGE
1 EACH MISCELLANEOUS 4 TIMES DAILY
Qty: 400 EACH | Refills: 3 | Status: SHIPPED | OUTPATIENT
Start: 2021-05-27 | End: 2021-06-13 | Stop reason: SDUPTHER

## 2021-05-27 RX ORDER — METOPROLOL SUCCINATE 25 MG/1
25 TABLET, EXTENDED RELEASE ORAL DAILY
Qty: 90 TABLET | Refills: 0 | Status: SHIPPED | OUTPATIENT
Start: 2021-05-27 | End: 2021-09-01

## 2021-05-27 RX ORDER — LANCETS 23 GAUGE
1 EACH MISCELLANEOUS 4 TIMES DAILY
COMMUNITY
End: 2021-05-27 | Stop reason: SDUPTHER

## 2021-05-27 RX ORDER — ALPRAZOLAM 0.25 MG/1
0.25 TABLET ORAL
Qty: 10 TABLET | Refills: 0 | Status: SHIPPED | OUTPATIENT
Start: 2021-05-27 | End: 2021-07-19 | Stop reason: SDUPTHER

## 2021-05-27 RX ORDER — CALCIUM CITRATE/VITAMIN D3 200MG-6.25
1 TABLET ORAL 4 TIMES DAILY
Qty: 400 STRIP | Refills: 1 | Status: SHIPPED | OUTPATIENT
Start: 2021-05-27 | End: 2021-06-13 | Stop reason: SDUPTHER

## 2021-05-27 RX ORDER — ALPRAZOLAM 0.25 MG/1
0.25 TABLET ORAL
Qty: 10 TABLET | Refills: 0 | Status: CANCELLED | OUTPATIENT
Start: 2021-05-27

## 2021-05-27 NOTE — PROGRESS NOTES
[facility]    ASSESSMENT AND PLAN     1  Hypertension, unspecified type    Significantly improved  /70, 150/80  Today in the office  States compliant with metoprolol XL 25, lisinopril 40 and amlodipine 10 daily  Asymptomatic  Continue monitoring home BP is  Encouraged follow-up with cardiologist , as previously recommended  Patient has yet to schedule  States she will schedule once she gets established with the endocrinologist (# 2)  Recommend follow-up 3 month  Sooner if needed    - metoprolol succinate (TOPROL-XL) 25 mg 24 hr tablet; Take 1 tablet (25 mg total) by mouth daily  Dispense: 90 tablet; Refill: 0  - Comprehensive metabolic panel; Future    2  Uncontrolled type 2 diabetes with neuropathy ([address] )   uncontrolled  Patient states home sugars are averaging 200-250  Unsure of how often she is assessing  Due for hemoglobin A1c in 1 month  She admits to canceling her endocrinology appointment several weeks ago  Has yet to reschedule  Encouraged she call and schedule next available  Discussed need for specialist input for maximal success  Patient has been educated multiple times of risks of uncontrolled diabetes  She is slowly making lifestyle changes  Encouragement given for continued success  Rx refilled today  Fasting lab work to obtain next month    - gabapentin (NEURONTIN) 100 mg capsule; Take 1 capsule (100 mg total) by mouth 3 (three) times a day  Dispense: 90 capsule; Refill: 0  - Lancets 28G MISC; Use 1 each 4 (four) times a day  Dispense: 400 each; Refill: 3  - glucose blood (True Metrix Blood Glucose Test) test strip; Use 1 each 4 (four) times a day  Dispense: 400 strip; Refill: 1  - Comprehensive metabolic panel; Future  - HEMOGLOBIN A1C W/ EAG ESTIMATION; Future  - CBC and differential; Future    3  Depression, unspecified depression type   stable  Patient states she feels well controlled on sertraline 75 at this time    Denies depression, and admits to only periodically anxiety attacks  No change in treatment at this time  Continue to monitor closely  Continue to also recommend talk therapy    - sertraline (ZOLOFT) 50 mg tablet; Take 1 5 tablets (75 mg total) by mouth daily  Dispense: 135 tablet; Refill: 0    4  Anxiety    - sertraline (ZOLOFT) 50 mg tablet; Take 1 5 tablets (75 mg total) by mouth daily  Dispense: 135 tablet; Refill: 0    5  Panic attack  Still with occasional panic attacks, but notes them less frequent  Requesting new xanax rx  Used last pill few days ago  PMED verified with no red flags  Small dose rx refilled    - ALPRAZolam (XANAX) 0 25 mg tablet; Take 1 tablet (0 25 mg total) by mouth daily at bedtime as needed for anxiety  Dispense: 10 tablet; Refill: 0    6  Mixed hyperlipidemia  Last lipid 11/2020  Trigs 589  HDL: 50  Repeat labs have been pending  Encouraged she have them completed  Discussed starting statin  Recommended with comorbidities:  Hypertension, diabetes, obesity  Await results    - Lipid panel; Future    7  Screening for thyroid disorder    - TSH, 3rd generation with Free T4 reflex; Future            SUBJECTIVE       Patient ID: Ashwin Poole is a 62 y o  female  Chief Complaint   Patient presents with    Follow-up     2M       HISTORY OF PRESENT ILLNESS    Presents today for routine check and medication refills  States she has been feeling better  States she has been compliant with all medications  Checking her blood pressure occasionally  Home BP is are averaging 150 over 80s  Denies any chest pain / pressure /tightness  Denies palpitations  She has been checking her sugars, and they are averaging 200-250 fasting occasionally she will have 150  She does admit to counseling endocrinology appointment  She has not rescheduled yet          The following portions of the patient's history were reviewed and updated as appropriate: allergies, current medications, past family history, past medical history, past social history, past surgical history and problem list     REVIEW OF SYSTEMS  Review of Systems   Constitutional: Negative  Respiratory: Negative  Cardiovascular: Negative  Gastrointestinal: Negative  Genitourinary: Negative  Psychiatric/Behavioral: Negative for dysphoric mood, self-injury, sleep disturbance and suicidal ideas   Nervous/anxious: occasional         OBJECTIVE      VITAL SIGNS  /70 (BP Location: Left arm, Patient Position: Sitting, Cuff Size: Large)   Pulse 89   Temp 97 5 °F (36 4 °C) (Tympanic)   Resp 17   Ht 5' 4 4" (1 636 m)   Wt 95 2 kg (209 lb 12 8 oz)   SpO2 98%   BMI 35 57 kg/m²     CURRENT MEDICATIONS    Current Outpatient Medications:     ALPRAZolam (XANAX) 0 25 mg tablet, Take 1 tablet (0 25 mg total) by mouth daily at bedtime as needed for anxiety, Disp: 10 tablet, Rfl: 0    amLODIPine (NORVASC) 10 mg tablet, Take 1 tablet (10 mg total) by mouth daily, Disp: 90 tablet, Rfl: 0    aspirin 81 mg chewable tablet, Chew 81 mg, Disp: , Rfl:     atorvastatin (LIPITOR) 40 mg tablet, Take 1 tablet (40 mg total) by mouth daily, Disp: 90 tablet, Rfl: 0    gabapentin (NEURONTIN) 100 mg capsule, Take 1 capsule (100 mg total) by mouth 3 (three) times a day, Disp: 90 capsule, Rfl: 0    insulin aspart (NovoLOG FlexPen) 100 UNIT/ML injection pen, Inject 12 Units under the skin 3 (three) times a day with meals , Disp: , Rfl:     insulin glargine (LANTUS SOLOSTAR) 100 units/mL injection pen, Inject 44 Units under the skin daily, Disp: , Rfl:     Lancets 28G MISC, Use 1 each 4 (four) times a day, Disp: 400 each, Rfl: 3    lisinopril (ZESTRIL) 40 mg tablet, Take 1 tablet (40 mg total) by mouth daily, Disp: 90 tablet, Rfl: 0    metoprolol succinate (TOPROL-XL) 25 mg 24 hr tablet, Take 1 tablet (25 mg total) by mouth daily, Disp: 90 tablet, Rfl: 0    sertraline (ZOLOFT) 50 mg tablet, Take 1 5 tablets (75 mg total) by mouth daily, Disp: 135 tablet, Rfl: 0    glucose blood (True Metrix Blood Glucose Test) test strip, Use 1 each 4 (four) times a day, Disp: 400 strip, Rfl: 1    insulin glargine (LANTUS SOLOSTAR) 100 units/mL injection pen, Inject 20 Units under the skin daily at bedtime Increase by 1 unit daily for fasting glucose over 150 (Patient not taking: Reported on 4/1/2021), Disp: 6 mL, Rfl: 0    Insulin Pen Needle (Pen Needles) 32G X 5 MM MISC, Use with insulin four times daily (Patient not taking: Reported on 5/27/2021), Disp: 100 each, Rfl: 3      PHYSICAL EXAMINATION   Physical Exam  Vitals signs and nursing note reviewed  Constitutional:       General: She is not in acute distress  Appearance: Normal appearance  She is not ill-appearing  HENT:      Head: Normocephalic and atraumatic  Right Ear: Tympanic membrane, ear canal and external ear normal       Left Ear: Tympanic membrane, ear canal and external ear normal    Neck:      Vascular: No carotid bruit  Cardiovascular:      Rate and Rhythm: Normal rate and regular rhythm  Heart sounds: Murmur present  Pulmonary:      Effort: Pulmonary effort is normal  No respiratory distress  Breath sounds: Normal breath sounds and air entry  Skin:     General: Skin is warm and dry  Comments: Multiple scratches on left upper arm  Pt states from cat  No signs infection   Neurological:      Mental Status: She is alert and oriented to person, place, and time     Psychiatric:         Attention and Perception: Attention normal          Mood and Affect: Mood and affect normal          Speech: Speech normal          Behavior: Behavior normal

## 2021-06-05 DIAGNOSIS — I10 HYPERTENSION, UNSPECIFIED TYPE: ICD-10-CM

## 2021-06-06 RX ORDER — LISINOPRIL 10 MG/1
TABLET ORAL
Qty: 90 TABLET | Refills: 0 | OUTPATIENT
Start: 2021-06-06

## 2021-06-06 RX ORDER — LISINOPRIL 20 MG/1
TABLET ORAL
Qty: 90 TABLET | Refills: 0 | OUTPATIENT
Start: 2021-06-06

## 2021-06-07 DIAGNOSIS — IMO0002 UNCONTROLLED TYPE 2 DIABETES WITH NEUROPATHY: ICD-10-CM

## 2021-06-07 RX ORDER — INSULIN GLARGINE 100 [IU]/ML
INJECTION, SOLUTION SUBCUTANEOUS
Qty: 15 ML | Refills: 0 | Status: SHIPPED | OUTPATIENT
Start: 2021-06-07 | End: 2021-08-25 | Stop reason: SDUPTHER

## 2021-06-08 NOTE — TELEPHONE ENCOUNTER
Please call patient  I did refill her Lantus, but please remind her that she is to schedule/establish with endocrinology  It does not look like she has scheduled that appointment yet

## 2021-06-08 NOTE — TELEPHONE ENCOUNTER
Left detailed message on machine for patient and provided patient with their phone number to schedule

## 2021-06-13 DIAGNOSIS — IMO0002 UNCONTROLLED TYPE 2 DIABETES WITH NEUROPATHY: ICD-10-CM

## 2021-06-14 RX ORDER — CALCIUM CITRATE/VITAMIN D3 200MG-6.25
1 TABLET ORAL 4 TIMES DAILY
Qty: 400 STRIP | Refills: 0 | Status: SHIPPED | OUTPATIENT
Start: 2021-06-14

## 2021-06-14 RX ORDER — LANCETS 23 GAUGE
1 EACH MISCELLANEOUS 4 TIMES DAILY
Qty: 400 EACH | Refills: 0 | Status: SHIPPED | OUTPATIENT
Start: 2021-06-14

## 2021-06-16 DIAGNOSIS — I10 HYPERTENSION, UNSPECIFIED TYPE: ICD-10-CM

## 2021-06-16 DIAGNOSIS — E78.2 MIXED HYPERLIPIDEMIA: ICD-10-CM

## 2021-06-17 PROCEDURE — 4010F ACE/ARB THERAPY RXD/TAKEN: CPT | Performed by: NURSE PRACTITIONER

## 2021-06-17 RX ORDER — ATORVASTATIN CALCIUM 40 MG/1
TABLET, FILM COATED ORAL
Qty: 90 TABLET | Refills: 0 | Status: SHIPPED | OUTPATIENT
Start: 2021-06-17 | End: 2021-09-14

## 2021-06-17 RX ORDER — LISINOPRIL 40 MG/1
TABLET ORAL
Qty: 90 TABLET | Refills: 0 | Status: SHIPPED | OUTPATIENT
Start: 2021-06-17 | End: 2021-09-14

## 2021-06-18 NOTE — TELEPHONE ENCOUNTER
Please call patient  Her lab work is pending/overdue  Please have her complete at her earliest convenience    Remind her to fast

## 2021-06-26 DIAGNOSIS — I10 BENIGN ESSENTIAL HYPERTENSION: ICD-10-CM

## 2021-06-26 RX ORDER — AMLODIPINE BESYLATE 10 MG/1
TABLET ORAL
Qty: 90 TABLET | Refills: 0 | Status: SHIPPED | OUTPATIENT
Start: 2021-06-26 | End: 2021-09-27

## 2021-07-19 DIAGNOSIS — IMO0002 UNCONTROLLED TYPE 2 DIABETES WITH NEUROPATHY: ICD-10-CM

## 2021-07-19 DIAGNOSIS — F41.0 PANIC ATTACK: ICD-10-CM

## 2021-07-19 RX ORDER — GABAPENTIN 100 MG/1
CAPSULE ORAL
Qty: 90 CAPSULE | Refills: 0 | Status: SHIPPED | OUTPATIENT
Start: 2021-07-19 | End: 2021-08-23

## 2021-07-20 NOTE — TELEPHONE ENCOUNTER
Requested medication(s) are due for refill today: Yes  Patient has already received a courtesy refill: No  Other reason request has been forwarded to provider: Last OV 5/27/21  Next OV 9/9/21

## 2021-07-21 RX ORDER — ALPRAZOLAM 0.25 MG/1
0.25 TABLET ORAL
Qty: 10 TABLET | Refills: 0 | Status: SHIPPED | OUTPATIENT
Start: 2021-07-21 | End: 2021-08-27 | Stop reason: SDUPTHER

## 2021-07-27 ENCOUNTER — VBI (OUTPATIENT)
Dept: ADMINISTRATIVE | Facility: OTHER | Age: 61
End: 2021-07-27

## 2021-08-12 ENCOUNTER — APPOINTMENT (OUTPATIENT)
Dept: LAB | Facility: CLINIC | Age: 61
End: 2021-08-12
Payer: COMMERCIAL

## 2021-08-12 DIAGNOSIS — IMO0002 UNCONTROLLED TYPE 2 DIABETES WITH NEUROPATHY: ICD-10-CM

## 2021-08-12 DIAGNOSIS — I10 HYPERTENSION, UNSPECIFIED TYPE: ICD-10-CM

## 2021-08-12 DIAGNOSIS — Z13.29 SCREENING FOR THYROID DISORDER: ICD-10-CM

## 2021-08-12 DIAGNOSIS — E78.2 MIXED HYPERLIPIDEMIA: ICD-10-CM

## 2021-08-12 LAB
ALBUMIN SERPL BCP-MCNC: 2.8 G/DL (ref 3.5–5)
ALP SERPL-CCNC: 159 U/L (ref 46–116)
ALT SERPL W P-5'-P-CCNC: 31 U/L (ref 12–78)
ANION GAP SERPL CALCULATED.3IONS-SCNC: 7 MMOL/L (ref 4–13)
AST SERPL W P-5'-P-CCNC: 16 U/L (ref 5–45)
BASOPHILS # BLD AUTO: 0.06 THOUSANDS/ΜL (ref 0–0.1)
BASOPHILS NFR BLD AUTO: 1 % (ref 0–1)
BILIRUB SERPL-MCNC: 0.37 MG/DL (ref 0.2–1)
BUN SERPL-MCNC: 16 MG/DL (ref 5–25)
CALCIUM ALBUM COR SERPL-MCNC: 10.4 MG/DL (ref 8.3–10.1)
CALCIUM SERPL-MCNC: 9.4 MG/DL (ref 8.3–10.1)
CHLORIDE SERPL-SCNC: 102 MMOL/L (ref 100–108)
CHOLEST SERPL-MCNC: 225 MG/DL (ref 50–200)
CO2 SERPL-SCNC: 28 MMOL/L (ref 21–32)
CREAT SERPL-MCNC: 0.65 MG/DL (ref 0.6–1.3)
EOSINOPHIL # BLD AUTO: 0.3 THOUSAND/ΜL (ref 0–0.61)
EOSINOPHIL NFR BLD AUTO: 3 % (ref 0–6)
ERYTHROCYTE [DISTWIDTH] IN BLOOD BY AUTOMATED COUNT: 13.2 % (ref 11.6–15.1)
EST. AVERAGE GLUCOSE BLD GHB EST-MCNC: 335 MG/DL
GFR SERPL CREATININE-BSD FRML MDRD: 97 ML/MIN/1.73SQ M
GLUCOSE P FAST SERPL-MCNC: 201 MG/DL (ref 65–99)
HBA1C MFR BLD: 13.3 %
HCT VFR BLD AUTO: 41.2 % (ref 34.8–46.1)
HDLC SERPL-MCNC: 48 MG/DL
HGB BLD-MCNC: 13.4 G/DL (ref 11.5–15.4)
IMM GRANULOCYTES # BLD AUTO: 0.05 THOUSAND/UL (ref 0–0.2)
IMM GRANULOCYTES NFR BLD AUTO: 1 % (ref 0–2)
LDLC SERPL CALC-MCNC: 122 MG/DL (ref 0–100)
LYMPHOCYTES # BLD AUTO: 2.19 THOUSANDS/ΜL (ref 0.6–4.47)
LYMPHOCYTES NFR BLD AUTO: 20 % (ref 14–44)
MCH RBC QN AUTO: 28.5 PG (ref 26.8–34.3)
MCHC RBC AUTO-ENTMCNC: 32.5 G/DL (ref 31.4–37.4)
MCV RBC AUTO: 88 FL (ref 82–98)
MONOCYTES # BLD AUTO: 0.98 THOUSAND/ΜL (ref 0.17–1.22)
MONOCYTES NFR BLD AUTO: 9 % (ref 4–12)
NEUTROPHILS # BLD AUTO: 7.31 THOUSANDS/ΜL (ref 1.85–7.62)
NEUTS SEG NFR BLD AUTO: 66 % (ref 43–75)
NONHDLC SERPL-MCNC: 177 MG/DL
NRBC BLD AUTO-RTO: 0 /100 WBCS
PLATELET # BLD AUTO: 307 THOUSANDS/UL (ref 149–390)
PMV BLD AUTO: 9.9 FL (ref 8.9–12.7)
POTASSIUM SERPL-SCNC: 4.4 MMOL/L (ref 3.5–5.3)
PROT SERPL-MCNC: 7.1 G/DL (ref 6.4–8.2)
RBC # BLD AUTO: 4.71 MILLION/UL (ref 3.81–5.12)
SODIUM SERPL-SCNC: 137 MMOL/L (ref 136–145)
TRIGL SERPL-MCNC: 275 MG/DL
TSH SERPL DL<=0.05 MIU/L-ACNC: 2.41 UIU/ML (ref 0.36–3.74)
WBC # BLD AUTO: 10.89 THOUSAND/UL (ref 4.31–10.16)

## 2021-08-12 PROCEDURE — 36415 COLL VENOUS BLD VENIPUNCTURE: CPT

## 2021-08-12 PROCEDURE — 80053 COMPREHEN METABOLIC PANEL: CPT

## 2021-08-12 PROCEDURE — 84443 ASSAY THYROID STIM HORMONE: CPT

## 2021-08-12 PROCEDURE — 80061 LIPID PANEL: CPT

## 2021-08-12 PROCEDURE — 83036 HEMOGLOBIN GLYCOSYLATED A1C: CPT

## 2021-08-12 PROCEDURE — 85025 COMPLETE CBC W/AUTO DIFF WBC: CPT

## 2021-08-12 PROCEDURE — 3046F HEMOGLOBIN A1C LEVEL >9.0%: CPT | Performed by: NURSE PRACTITIONER

## 2021-08-16 ENCOUNTER — VBI (OUTPATIENT)
Dept: ADMINISTRATIVE | Facility: OTHER | Age: 61
End: 2021-08-16

## 2021-08-21 DIAGNOSIS — IMO0002 UNCONTROLLED TYPE 2 DIABETES WITH NEUROPATHY: ICD-10-CM

## 2021-08-23 RX ORDER — GABAPENTIN 100 MG/1
CAPSULE ORAL
Qty: 90 CAPSULE | Refills: 0 | Status: SHIPPED | OUTPATIENT
Start: 2021-08-23 | End: 2021-09-20

## 2021-08-25 ENCOUNTER — CONSULT (OUTPATIENT)
Dept: ENDOCRINOLOGY | Facility: CLINIC | Age: 61
End: 2021-08-25
Payer: COMMERCIAL

## 2021-08-25 VITALS
HEIGHT: 63 IN | BODY MASS INDEX: 38.45 KG/M2 | HEART RATE: 92 BPM | WEIGHT: 217 LBS | SYSTOLIC BLOOD PRESSURE: 160 MMHG | DIASTOLIC BLOOD PRESSURE: 84 MMHG

## 2021-08-25 DIAGNOSIS — E11.42 DIABETIC PERIPHERAL NEUROPATHY (HCC): ICD-10-CM

## 2021-08-25 DIAGNOSIS — IMO0002 UNCONTROLLED TYPE 2 DIABETES WITH NEUROPATHY: Primary | ICD-10-CM

## 2021-08-25 DIAGNOSIS — G47.33 OSA (OBSTRUCTIVE SLEEP APNEA): ICD-10-CM

## 2021-08-25 DIAGNOSIS — E78.49 FAMILIAL COMBINED HYPERLIPIDEMIA: ICD-10-CM

## 2021-08-25 DIAGNOSIS — E83.52 HYPERCALCEMIA: ICD-10-CM

## 2021-08-25 DIAGNOSIS — E55.9 VITAMIN D DEFICIENCY: ICD-10-CM

## 2021-08-25 PROCEDURE — 99244 OFF/OP CNSLTJ NEW/EST MOD 40: CPT | Performed by: INTERNAL MEDICINE

## 2021-08-25 RX ORDER — SEMAGLUTIDE 1.34 MG/ML
0.25 INJECTION, SOLUTION SUBCUTANEOUS WEEKLY
Qty: 6 ML | Refills: 0 | Status: CANCELLED | OUTPATIENT
Start: 2021-08-25

## 2021-08-25 RX ORDER — FLASH GLUCOSE SENSOR
1 KIT MISCELLANEOUS
Qty: 2 EACH | Refills: 6 | Status: SHIPPED | OUTPATIENT
Start: 2021-08-25 | End: 2022-03-30 | Stop reason: SDUPTHER

## 2021-08-25 RX ORDER — INSULIN ASPART 100 [IU]/ML
15 INJECTION, SOLUTION INTRAVENOUS; SUBCUTANEOUS
Qty: 15 ML | Refills: 0 | Status: SHIPPED | OUTPATIENT
Start: 2021-08-25 | End: 2021-10-18 | Stop reason: SDUPTHER

## 2021-08-25 RX ORDER — INSULIN GLARGINE 100 [IU]/ML
50 INJECTION, SOLUTION SUBCUTANEOUS
Qty: 15 ML | Refills: 0 | Status: SHIPPED | OUTPATIENT
Start: 2021-08-25 | End: 2021-10-18 | Stop reason: SDUPTHER

## 2021-08-25 RX ORDER — FLASH GLUCOSE SCANNING READER
1 EACH MISCELLANEOUS CONTINUOUS
Qty: 1 EACH | Refills: 0 | Status: SHIPPED | OUTPATIENT
Start: 2021-08-25

## 2021-08-25 NOTE — LETTER
Medical Fitness Referral    Patient: Elenita Williamson  : 1960  MRN: [id number]  Address: [facility] [address]  Phone Number: [phone]  E-mail: [email or web address]    [] Medical Disorders (Ex  Diabetes)   [] Cardiovascular Disorders (Ex  Hypertension)   [] Pulmonary Disorders (Ex  Asthma)   [] Cancer Disorders (Ex  Breast, Prostate)   [] Immunological & Hematological Disorders (Rheumatoid Arthritis)   [] Neurological Disorders (Ex  Parkinson's Disease)   [] Cognitive Disorders (Ex  Dementia)   [] Children & Adolescents (Ex  BMI)   [] Older Adults (Ex  Balance & Ambulation)   [] Female Specific Disorders (Ex  Osteoporosis)       Diagnoses:   1  Uncontrolled type 2 diabetes with neuropathy (HCC)  Hemoglobin A1C    Continuous Blood Gluc Sensor (FreeStyle [address] 14 Day Sensor) MISC    Continuous Blood Gluc  (FreeStyle [address] 14 Day [place]) LITO    Lancets    insulin glargine (Lantus SoloStar) 100 units/mL injection pen    insulin aspart (NovoLOG FlexPen) 100 UNIT/ML injection pen    Ambulatory referral to Diabetic Education    Comprehensive metabolic panel    Vitamin D 25 hydroxy    PTH, intact    Phosphorus Lab Collect    DISCONTINUED: Dulaglutide 0 75 MG/0 5ML SOPN   2  Diabetic peripheral neuropathy ([address] )     3  Vitamin D deficiency     4  Familial combined hyperlipidemia     5  BENITO (obstructive sleep apnea)  Ambulatory referral to Sleep Medicine   6   Hypercalcemia       Additional Comments:    Service Requested:  [x] Medical Fitness Consult- Screening For Appropriateness of Medical Fitness Program   [x] Medical Fitness Program- Assessment of Body Composition, Aerobic, Anaerobic, Muscle Strength & Endurance, Flexibility, Neuromuscular & Functional Fitness   [x] Medical Fitness Assessment- Individualized Evidence-Based Exercise Program       Requesting Provider: Prakash Hodges MD  Date: 21

## 2021-08-25 NOTE — PROGRESS NOTES
New Patient Note      CC: diabetes    History of Present Illness:   62 yr female with type 2 diabetes for 6 yrs, HTN, HLD, DPN, retinopathy, nephropathy, CVA 5 yrs ago, anxiety, DJD and sleep disturbance  She was referred by PCP for diabetes management  She was initially diagnosed about 6 years ago  She was started on insulin from start  Metformin was used for 1 year but she had severe diarrhea requiring hospitalization 2 yrs ago and stopped  Since then she had been on basal bolus therapy  She reports polyuria, polydipsia, blurred vision  FH - stomach cancer, Mother - type 2 diabetes and lung cancer, Brother - thyroid dysfunction    Home blood glucose monitoring: she checks 1-3/day does not log them  Usually in 300-400mg/dL range  Hypoglycemia: No    Current meds:  Lantus 44u bedtime  Novolog 12u tidac    Opthamology: has retinopathy, will make appointment  Podiatry: No, has neuropathy and takes gabapentin  Influenza: COVID - yes  Dental: False teeth  Pancreatitis: No    Ace/ARB: Lisinopril  Statin:Lipitor 40mg  Thyroid issues:No    Patient Active Problem List   Diagnosis    Esophageal reflux    Uncontrolled type 2 diabetes with neuropathy (HCC)    Depression    Anxiety    Benign essential hypertension    C[facility]    Diabetic peripheral neuropathy (HCC)    Vitamin D deficiency    Systolic murmur    Familial combined hyperlipidemia    Arthritis    Acute colitis    Moderate protein-calorie malnutrition (HCC)    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    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 (HCC)     Past Medical History:   Diagnosis Date    Anxiety     Depression     [facility]     Diabetes mellitus ([facility]     Esophageal reflux     28 APR 2015 RESOLVED    Hyperlipidemia     Hypertension     Low back pain     sciatica    Pneumonia 2019    Stroke ([facility] ) 12/2015    small vessel, L frontal corona radiata   Rx asa 81, Lipitor 36, risk modification    Vitamin D deficiency       Past Surgical History:   Procedure Laterality Date    COLONOSCOPY        Family History   Problem Relation Age of Onset    Diabetes Mother     Lung cancer Mother     Cancer Father     Lung cancer Father     Hypertension Brother     Hypertension Family      Social History     Tobacco Use    Smoking status: Former Smoker     Types: Cigarettes     Quit date:      Years since quittin 6    Smokeless tobacco: Never Used   Substance Use Topics    Alcohol use: No     Comment: OCCASIONAL ALCOHOL USE IN ALL SCRIPTS     No Known Allergies      Current Outpatient Medications:     ALPRAZolam (XANAX) 0 25 mg tablet, Take 1 tablet (0 25 mg total) by mouth daily at bedtime as needed for anxiety, Disp: 10 tablet, Rfl: 0    amLODIPine (NORVASC) 10 mg tablet, TAKE 1 TABLET BY MOUTH EVERY DAY, Disp: 90 tablet, Rfl: 0    aspirin 81 mg chewable tablet, Chew 81 mg, Disp: , Rfl:     atorvastatin (LIPITOR) 40 mg tablet, TAKE 1 TABLET BY MOUTH EVERY DAY, Disp: 90 tablet, Rfl: 0    gabapentin (NEURONTIN) 100 mg capsule, TAKE 1 CAPSULE BY MOUTH THREE TIMES A DAY, Disp: 90 capsule, Rfl: 0    glucose blood (True Metrix Blood Glucose Test) test strip, Use 1 each 4 (four) times a day, Disp: 400 strip, Rfl: 0    insulin aspart (NovoLOG FlexPen) 100 UNIT/ML injection pen, Inject 12 Units under the skin 3 (three) times a day with meals , Disp: , Rfl:     Lancets 28G MISC, Use 1 each 4 (four) times a day, Disp: 400 each, Rfl: 0    Lantus SoloStar 100 units/mL injection pen, INJECT 20 UNITS UNDER THE SKIN DAILY AT BEDTIME INCREASE BY 1 UNIT DAILY FOR FASTING GLUCOSE OVER 150 (PLAN LIMIT MIMIMUM SUPLY TO BILL FOR 84 DAYS) (Patient taking differently: 44 Units daily ), Disp: 15 mL, Rfl: 0    lisinopril (ZESTRIL) 40 mg tablet, TAKE 1 TABLET BY MOUTH EVERY DAY, Disp: 90 tablet, Rfl: 0    metoprolol succinate (TOPROL-XL) 25 mg 24 hr tablet, Take 1 tablet (25 mg total) by mouth daily, Disp: 90 tablet, Rfl: 0    sertraline (ZOLOFT) 50 mg tablet, Take 1 5 tablets (75 mg total) by mouth daily, Disp: 135 tablet, Rfl: 0    Insulin Pen Needle (Pen Needles) 32G X 5 MM MISC, Use with insulin four times daily (Patient not taking: Reported on 5/27/2021), Disp: 100 each, Rfl: 3    Review of Systems    Physical Exam:  Body mass index is 38 44 kg/m²  /84   Pulse 92   Ht 5' 3" (1 6 m)   Wt 98 4 kg (217 lb)   BMI 38 44 kg/m²    Vitals:    08/25/21 0748   Weight: 98 4 kg (217 lb)        Physical Exam    Labs:   Lab Results   Component Value Date    HGBA1C 13 3 (H) 08/12/2021       Lab Results   Component Value Date    DOC0IIWDMRDQ 2 410 08/12/2021       Lab Results   Component Value Date    CREATININE 0 65 08/12/2021    CREATININE 0 76 11/17/2020    CREATININE 0 70 02/11/2020    CREATININE 0 71 02/11/2020    BUN 16 08/12/2021    K 4 4 08/12/2021     08/12/2021    CO2 28 08/12/2021     eGFR   Date Value Ref Range Status   08/12/2021 97 ml/min/1 73sq m Final       Lab Results   Component Value Date    ALT 31 08/12/2021    AST 16 08/12/2021    ALKPHOS 159 (H) 08/12/2021       Lab Results   Component Value Date    CHOLESTEROL 225 (H) 08/12/2021    CHOLESTEROL 380 (H) 11/17/2020    CHOLESTEROL 152 01/31/2020     Lab Results   Component Value Date    HDL 48 08/12/2021    HDL 50 11/17/2020    HDL 18 (L) 01/31/2020     Lab Results   Component Value Date    TRIG 275 (H) 08/12/2021    TRIG 589 (H) 11/17/2020    TRIG 174 (H) 01/31/2020     Lab Results   Component Value Date    [place] 177 08/12/2021    [place] 330 11/17/2020    [place] 134 01/31/2020         Impression:  1  Uncontrolled type 2 diabetes with neuropathy ([facility] )    2  Diabetic peripheral neuropathy ([facility] )    3  Vitamin D deficiency    4  Familial combined hyperlipidemia    5  BENITO (obstructive sleep apnea)    6   Hypercalcemia         Plan:    Diagnoses and all orders for this visit:    Uncontrolled type 2 diabetes with neuropathy ([address] )  She is uncontrolled with A1c 13 3%  Goal is 7%  She has long standing poor control with underlying depression and self neglect  Today all aspects of diabetes management including pathophysiology, goals of treatment, complications, treatment options, SAGM, CGM, diet, lifestyle, associated conditions like BENITO, metabolic syndrome, HTn and medications including GLP1 agonist and insulin therapy were discussed  Questions were answered  Will aim to gradually bring A1c by 1-2% in next 8 weeks to avoid worsening retinopathy  Insulin dose was increased as listed  Will follow up in 6 weeks with plan to further change insulin and add GLP1 agonist  Trulicity would be preferred to Ozempic  Advised to send glucose logs in 2 weeks  Follow up in 6 weeks  -     Hemoglobin A1C; Future  -     Continuous Blood Gluc Sensor (FreeStyle Noé 14 Day Sensor) MISC; Use 1 Units every 14 (fourteen) days  -     Continuous Blood Gluc  (FreeStyle Noé 14 Day [place]) LITO; Use 1 Units continuous  -     Lancets  -     insulin glargine (Lantus SoloStar) 100 units/mL injection pen; Inject 50 Units under the skin daily at bedtime  -     insulin aspart (NovoLOG FlexPen) 100 UNIT/ML injection pen; Inject 15 Units under the skin 3 (three) times a day with meals  -     Ambulatory referral to Diabetic Education; Future    Diabetic peripheral neuropathy (HCC)    Vitamin D deficiency  Also note hypercalcemia on last labs  Will check labs as listed  -     PTH, intact; Future  -     Phosphorus Lab Collect; Future  -     Vitamin D 25 hydroxy; Future  -     Comprehensive metabolic panel; Future    Familial combined hyperlipidemia  On lipitor  BENITO (obstructive sleep apnea)  -     Ambulatory referral to Sleep Medicine; Future    Hypercalcemia        I have spent 60 minutes with patient today in which greater than 50% of this time was spent in counseling/coordination of care        Discussed with the patient and all questioned fully answered  She will call me if any problems arise  Educated/ Counseled patient on diagnostic test results, prognosis, risk vs benefit of treatment options, importance of treatment compliance, healthy life and lifestyle choices        Eren Muñiz

## 2021-08-27 ENCOUNTER — OFFICE VISIT (OUTPATIENT)
Dept: FAMILY MEDICINE CLINIC | Facility: CLINIC | Age: 61
End: 2021-08-27
Payer: COMMERCIAL

## 2021-08-27 VITALS
BODY MASS INDEX: 39.34 KG/M2 | RESPIRATION RATE: 16 BRPM | OXYGEN SATURATION: 96 % | DIASTOLIC BLOOD PRESSURE: 82 MMHG | TEMPERATURE: 97.9 F | HEART RATE: 99 BPM | WEIGHT: 222 LBS | SYSTOLIC BLOOD PRESSURE: 128 MMHG | HEIGHT: 63 IN

## 2021-08-27 DIAGNOSIS — M79.18 ABDOMINAL MUSCLE PAIN: Primary | ICD-10-CM

## 2021-08-27 DIAGNOSIS — R01.1 SYSTOLIC MURMUR: ICD-10-CM

## 2021-08-27 DIAGNOSIS — F41.0 PANIC ATTACK: ICD-10-CM

## 2021-08-27 DIAGNOSIS — F33.9 DEPRESSION, RECURRENT (HCC): ICD-10-CM

## 2021-08-27 DIAGNOSIS — I10 HYPERTENSION, UNSPECIFIED TYPE: ICD-10-CM

## 2021-08-27 DIAGNOSIS — IMO0002 UNCONTROLLED TYPE 2 DIABETES WITH NEUROPATHY: ICD-10-CM

## 2021-08-27 DIAGNOSIS — Z23 NEED FOR SHINGLES VACCINE: ICD-10-CM

## 2021-08-27 DIAGNOSIS — F41.9 ANXIETY: ICD-10-CM

## 2021-08-27 PROCEDURE — 3079F DIAST BP 80-89 MM HG: CPT | Performed by: NURSE PRACTITIONER

## 2021-08-27 PROCEDURE — 99214 OFFICE O/P EST MOD 30 MIN: CPT | Performed by: NURSE PRACTITIONER

## 2021-08-27 PROCEDURE — 90471 IMMUNIZATION ADMIN: CPT | Performed by: NURSE PRACTITIONER

## 2021-08-27 PROCEDURE — 3074F SYST BP LT 130 MM HG: CPT | Performed by: NURSE PRACTITIONER

## 2021-08-27 PROCEDURE — 90750 HZV VACC RECOMBINANT IM: CPT | Performed by: NURSE PRACTITIONER

## 2021-08-27 PROCEDURE — 3008F BODY MASS INDEX DOCD: CPT | Performed by: NURSE PRACTITIONER

## 2021-08-27 PROCEDURE — 1036F TOBACCO NON-USER: CPT | Performed by: NURSE PRACTITIONER

## 2021-08-27 RX ORDER — ALPRAZOLAM 0.25 MG/1
0.25 TABLET ORAL
Qty: 10 TABLET | Refills: 0 | Status: SHIPPED | OUTPATIENT
Start: 2021-08-27 | End: 2021-09-28

## 2021-08-27 RX ORDER — SERTRALINE HYDROCHLORIDE 100 MG/1
100 TABLET, FILM COATED ORAL DAILY
Qty: 30 TABLET | Refills: 2 | Status: SHIPPED | OUTPATIENT
Start: 2021-08-27 | End: 2022-02-05

## 2021-08-27 NOTE — PROGRESS NOTES
[facility]    ASSESSMENT AND PLAN     1  Abdominal muscle pain     Symptoms consistent today with likely muscular strain / costochondritis  Reproducible today  Pain with palpation along base of rib cage only  Cardiopulmonary unremarkable for acute change  Reviewed conservative treatments: Ice/ heat, heating pad, Lidoderm patches, Voltaren gel, bracing with a pillow for  muscle changes /movement, coughing /sneezing, deep breaths  May continue Tylenol/ Advil p r n     Follow-up if symptoms persist despite conservative treatment above  Advise ER precaution with any acute change    2  Depression, recurrent ([address] )   moderately controlled on sertraline 75  But still with periods of sadness/decreased motivation  No SI/HI  Patient had been on 100 prior with good relief  Will increase today  Monitor symptoms and follow-up in 6-8 weeks for symptom recheck  Sooner if needed  3  Anxiety    - sertraline (ZOLOFT) 100 mg tablet; Take 1 tablet (100 mg total) by mouth daily  Dispense: 30 tablet; Refill: 2    4  Uncontrolled type 2 diabetes with neuropathy [facility])  Managed by endocrinology  Was just there yesterday  Medication adjustments made  Foot exam today  Note she has been referred to diabetic Education by endocrinology  Encouraged compliance  note hemoglobin A1c 8/12:13 3    - Ambulatory referral to Ophthalmology; Future    5  Hypertension, unspecified type   stable  128/82 today  Patient asymptomatic  Compliant on metoprolol succinate 25, lisinopril 40 and amlodipine 10 daily  No change in treatment at this time  Continue ASA 81    6  Need for shingles vaccine   series started today  Administered by MA without issue  Return 2-6 months for 2nd dose    - Zoster Vaccine Recombinant IM    7  Panic attack   p r n  Xanax renewed today ( 10 pills)  For use with panic attacks  PMED  Verified with no red flags     - Xanax    8  Systolic murmur   patient with systolic murmur    Referral to Cardiology still pending from April Encourage follow-up    SUBJECTIVE       Patient ID: Gregoria Bowen is a 62 y o  female  Chief Complaint   Patient presents with    pain in a left upper quadr      x 2 weeks       HISTORY OF PRESENT ILLNESS    Presents today with complaint of right lower ribcage / upper abdomen pain  Present for the last 2 weeks  States she was dog sitting for 1 week prior, and had to get up and down from low furniture repeatedly  States she does use her abdominal muscles when doing so  She has pulled muslces in the past, but this is a stronger pain, and has been more persistent  Qualifies the pain as a constant ache, with occasional sharp stabs  Most notable with coughing, sneezing, bending and deep breaths  She has taken Advil and Tylenol with relief of the constant ache, but still has sharp pains on occasion  She tries to brace is her self with just her hand under her ribcage with forceful movements  The ache is fairly localized and constant, no pain or pressure radiating up into the chest or down into the lower abdomen  No palpitations  No chest pain / pressure / tightness or shortness of breath  No nausea or vomiting  No change in food and or dietary habits  No change in bowel or bladder          The following portions of the patient's history were reviewed and updated as appropriate: allergies, current medications, past family history, past medical history, past social history, past surgical history and problem list     REVIEW OF SYSTEMS  Review of Systems   Musculoskeletal:        Left lower ribcage pain as in HPI       OBJECTIVE      VITAL SIGNS  /82 (BP Location: Right arm)   Pulse 99   Temp 97 9 °F (36 6 °C) (Temporal)   Resp 16   Ht 5' 2 99" (1 6 m)   Wt 101 kg (222 lb)   SpO2 96%   BMI 39 34 kg/m²     CURRENT MEDICATIONS    Current Outpatient Medications:     ALPRAZolam (XANAX) 0 25 mg tablet, Take 1 tablet (0 25 mg total) by mouth daily at bedtime as needed for anxiety, Disp: 10 tablet, Rfl: 0    amLODIPine (NORVASC) 10 mg tablet, TAKE 1 TABLET BY MOUTH EVERY DAY, Disp: 90 tablet, Rfl: 0    aspirin 81 mg chewable tablet, Chew 81 mg, Disp: , Rfl:     atorvastatin (LIPITOR) 40 mg tablet, TAKE 1 TABLET BY MOUTH EVERY DAY, Disp: 90 tablet, Rfl: 0    Continuous Blood Gluc  (FreeStyle Noé 14 Day [place]) LITO, Use 1 Units continuous, Disp: 1 each, Rfl: 0    Continuous Blood Gluc Sensor (FreeStyle Noé 14 Day Sensor) MISC, Use 1 Units every 14 (fourteen) days, Disp: 2 each, Rfl: 6    gabapentin (NEURONTIN) 100 mg capsule, TAKE 1 CAPSULE BY MOUTH THREE TIMES A DAY, Disp: 90 capsule, Rfl: 0    glucose blood (True Metrix Blood Glucose Test) test strip, Use 1 each 4 (four) times a day, Disp: 400 strip, Rfl: 0    insulin aspart (NovoLOG FlexPen) 100 UNIT/ML injection pen, Inject 15 Units under the skin 3 (three) times a day with meals, Disp: 15 mL, Rfl: 0    insulin glargine (Lantus SoloStar) 100 units/mL injection pen, Inject 50 Units under the skin daily at bedtime, Disp: 15 mL, Rfl: 0    Lancets 28G MISC, Use 1 each 4 (four) times a day, Disp: 400 each, Rfl: 0    lisinopril (ZESTRIL) 40 mg tablet, TAKE 1 TABLET BY MOUTH EVERY DAY, Disp: 90 tablet, Rfl: 0    metoprolol succinate (TOPROL-XL) 25 mg 24 hr tablet, Take 1 tablet (25 mg total) by mouth daily, Disp: 90 tablet, Rfl: 0    Insulin Pen Needle (Pen Needles) 32G X 5 MM MISC, Use with insulin four times daily (Patient not taking: Reported on 5/27/2021), Disp: 100 each, Rfl: 3    sertraline (ZOLOFT) 100 mg tablet, Take 1 tablet (100 mg total) by mouth daily, Disp: 30 tablet, Rfl: 2      PHYSICAL EXAMINATION   Physical Exam  Vitals and nursing note reviewed  Constitutional:       General: She is not in acute distress  Appearance: Normal appearance  She is not ill-appearing  Cardiovascular:      Rate and Rhythm: Normal rate and regular rhythm        Pulses: no weak pulses          Dorsalis pedis pulses are 2+ on the right side and 2+ on the left side  Posterior tibial pulses are 2+ on the right side and 2+ on the left side  Heart sounds: Murmur heard  Comments:  Cardiology referral was placed and pending  Pulmonary:      Effort: Pulmonary effort is normal  No respiratory distress  Breath sounds: Normal breath sounds and air entry  Musculoskeletal:      Right lower leg: No edema  Left lower leg: No edema  Feet:      Right foot:      Skin integrity: No ulcer, skin breakdown, erythema, warmth, callus or dry skin  Left foot:      Skin integrity: No ulcer, skin breakdown, erythema, warmth, callus or dry skin  Skin:     General: Skin is warm and dry  Neurological:      General: No focal deficit present  Mental Status: She is alert and oriented to person, place, and time  Psychiatric:         Attention and Perception: Attention normal          Mood and Affect: Mood and affect normal          Speech: Speech normal          Behavior: Behavior normal          Thought Content: Thought content normal              Patient's shoes and socks removed  Right Foot/Ankle   Right Foot Inspection  Skin Exam: skin normal and skin intact no dry skin, no warmth, no callus, no erythema, no maceration, no abnormal color, no pre-ulcer, no ulcer and no callus                            Sensory   Vibration: intact  Proprioception: intact   Monofilament testing: intact  Vascular  Capillary refills: < 3 seconds  The right DP pulse is 2+  The right PT pulse is 2+  Left Foot/Ankle  Left Foot Inspection  Skin Exam: skin normal and skin intactno dry skin, no warmth, no erythema, no maceration, normal color, no pre-ulcer, no ulcer and no callus                                         Sensory   Vibration: intact  Proprioception: intact  Monofilament: intact  Vascular  Capillary refills: < 3 seconds  The left DP pulse is 2+  The left PT pulse is 2+     Assign Risk Category:  No deformity present; No loss of protective sensation;  No weak pulses       Risk: 0

## 2021-09-01 DIAGNOSIS — I10 HYPERTENSION, UNSPECIFIED TYPE: ICD-10-CM

## 2021-09-01 RX ORDER — METOPROLOL SUCCINATE 25 MG/1
TABLET, EXTENDED RELEASE ORAL
Qty: 90 TABLET | Refills: 0 | Status: SHIPPED | OUTPATIENT
Start: 2021-09-01 | End: 2021-11-13

## 2021-09-14 DIAGNOSIS — I10 HYPERTENSION, UNSPECIFIED TYPE: ICD-10-CM

## 2021-09-14 DIAGNOSIS — E78.2 MIXED HYPERLIPIDEMIA: ICD-10-CM

## 2021-09-14 PROCEDURE — 4010F ACE/ARB THERAPY RXD/TAKEN: CPT | Performed by: NURSE PRACTITIONER

## 2021-09-14 RX ORDER — ATORVASTATIN CALCIUM 40 MG/1
TABLET, FILM COATED ORAL
Qty: 90 TABLET | Refills: 1 | Status: SHIPPED | OUTPATIENT
Start: 2021-09-14 | End: 2022-02-08

## 2021-09-14 RX ORDER — LISINOPRIL 40 MG/1
TABLET ORAL
Qty: 90 TABLET | Refills: 1 | Status: SHIPPED | OUTPATIENT
Start: 2021-09-14

## 2021-09-20 DIAGNOSIS — IMO0002 UNCONTROLLED TYPE 2 DIABETES WITH NEUROPATHY: ICD-10-CM

## 2021-09-20 RX ORDER — GABAPENTIN 100 MG/1
CAPSULE ORAL
Qty: 90 CAPSULE | Refills: 0 | Status: SHIPPED | OUTPATIENT
Start: 2021-09-20 | End: 2021-11-04

## 2021-09-27 DIAGNOSIS — I10 BENIGN ESSENTIAL HYPERTENSION: ICD-10-CM

## 2021-09-27 DIAGNOSIS — F41.0 PANIC ATTACK: ICD-10-CM

## 2021-09-28 ENCOUNTER — VBI (OUTPATIENT)
Dept: ADMINISTRATIVE | Facility: OTHER | Age: 61
End: 2021-09-28

## 2021-09-28 RX ORDER — AMLODIPINE BESYLATE 10 MG/1
TABLET ORAL
Qty: 90 TABLET | Refills: 1 | Status: SHIPPED | OUTPATIENT
Start: 2021-09-28 | End: 2022-02-07

## 2021-09-28 RX ORDER — ALPRAZOLAM 0.25 MG/1
TABLET ORAL
Qty: 10 TABLET | Refills: 0 | Status: SHIPPED | OUTPATIENT
Start: 2021-09-28 | End: 2022-03-16 | Stop reason: SDUPTHER

## 2021-10-14 ENCOUNTER — LAB (OUTPATIENT)
Dept: LAB | Facility: CLINIC | Age: 61
End: 2021-10-14
Payer: COMMERCIAL

## 2021-10-14 DIAGNOSIS — IMO0002 UNCONTROLLED TYPE 2 DIABETES WITH NEUROPATHY: ICD-10-CM

## 2021-10-14 LAB
25(OH)D3 SERPL-MCNC: 14.9 NG/ML (ref 30–100)
ALBUMIN SERPL BCP-MCNC: 2.7 G/DL (ref 3.5–5)
ALP SERPL-CCNC: 145 U/L (ref 46–116)
ALT SERPL W P-5'-P-CCNC: 21 U/L (ref 12–78)
ANION GAP SERPL CALCULATED.3IONS-SCNC: 6 MMOL/L (ref 4–13)
AST SERPL W P-5'-P-CCNC: 13 U/L (ref 5–45)
BILIRUB SERPL-MCNC: 0.34 MG/DL (ref 0.2–1)
BUN SERPL-MCNC: 18 MG/DL (ref 5–25)
CALCIUM ALBUM COR SERPL-MCNC: 10.5 MG/DL (ref 8.3–10.1)
CALCIUM SERPL-MCNC: 9.5 MG/DL (ref 8.3–10.1)
CHLORIDE SERPL-SCNC: 103 MMOL/L (ref 100–108)
CO2 SERPL-SCNC: 25 MMOL/L (ref 21–32)
CREAT SERPL-MCNC: 0.9 MG/DL (ref 0.6–1.3)
GFR SERPL CREATININE-BSD FRML MDRD: 70 ML/MIN/1.73SQ M
GLUCOSE P FAST SERPL-MCNC: 388 MG/DL (ref 65–99)
PHOSPHATE SERPL-MCNC: 3.4 MG/DL (ref 2.3–4.1)
POTASSIUM SERPL-SCNC: 4 MMOL/L (ref 3.5–5.3)
PROT SERPL-MCNC: 7.1 G/DL (ref 6.4–8.2)
PTH-INTACT SERPL-MCNC: 84.7 PG/ML (ref 18.4–80.1)
SODIUM SERPL-SCNC: 134 MMOL/L (ref 136–145)

## 2021-10-14 PROCEDURE — 84100 ASSAY OF PHOSPHORUS: CPT

## 2021-10-14 PROCEDURE — 82306 VITAMIN D 25 HYDROXY: CPT

## 2021-10-14 PROCEDURE — 83970 ASSAY OF PARATHORMONE: CPT

## 2021-10-14 PROCEDURE — 80053 COMPREHEN METABOLIC PANEL: CPT

## 2021-10-14 PROCEDURE — 36415 COLL VENOUS BLD VENIPUNCTURE: CPT

## 2021-10-18 ENCOUNTER — OFFICE VISIT (OUTPATIENT)
Dept: ENDOCRINOLOGY | Facility: CLINIC | Age: 61
End: 2021-10-18
Payer: COMMERCIAL

## 2021-10-18 VITALS
HEIGHT: 63 IN | BODY MASS INDEX: 41.43 KG/M2 | HEART RATE: 93 BPM | WEIGHT: 233.8 LBS | DIASTOLIC BLOOD PRESSURE: 80 MMHG | SYSTOLIC BLOOD PRESSURE: 148 MMHG

## 2021-10-18 DIAGNOSIS — Z79.4 TYPE 2 DIABETES MELLITUS WITH HYPERGLYCEMIA, WITH LONG-TERM CURRENT USE OF INSULIN (HCC): ICD-10-CM

## 2021-10-18 DIAGNOSIS — I10 BENIGN ESSENTIAL HYPERTENSION: ICD-10-CM

## 2021-10-18 DIAGNOSIS — IMO0002 UNCONTROLLED TYPE 2 DIABETES WITH NEUROPATHY: ICD-10-CM

## 2021-10-18 DIAGNOSIS — E66.01 CLASS 3 SEVERE OBESITY DUE TO EXCESS CALORIES WITH SERIOUS COMORBIDITY AND BODY MASS INDEX (BMI) OF 40.0 TO 44.9 IN ADULT (HCC): ICD-10-CM

## 2021-10-18 DIAGNOSIS — E21.3 HYPERPARATHYROIDISM (HCC): ICD-10-CM

## 2021-10-18 DIAGNOSIS — E11.3391 TYPE 2 DIABETES MELLITUS WITH RIGHT EYE AFFECTED BY MODERATE NONPROLIFERATIVE RETINOPATHY WITHOUT MACULAR EDEMA, WITH LONG-TERM CURRENT USE OF INSULIN (HCC): Primary | ICD-10-CM

## 2021-10-18 DIAGNOSIS — E55.9 VITAMIN D DEFICIENCY: ICD-10-CM

## 2021-10-18 DIAGNOSIS — Z79.4 TYPE 2 DIABETES MELLITUS WITH RIGHT EYE AFFECTED BY MODERATE NONPROLIFERATIVE RETINOPATHY WITHOUT MACULAR EDEMA, WITH LONG-TERM CURRENT USE OF INSULIN (HCC): Primary | ICD-10-CM

## 2021-10-18 DIAGNOSIS — E11.65 TYPE 2 DIABETES MELLITUS WITH HYPERGLYCEMIA, WITH LONG-TERM CURRENT USE OF INSULIN (HCC): ICD-10-CM

## 2021-10-18 PROBLEM — E66.813 CLASS 3 SEVERE OBESITY DUE TO EXCESS CALORIES WITH SERIOUS COMORBIDITY AND BODY MASS INDEX (BMI) OF 40.0 TO 44.9 IN ADULT (HCC): Status: ACTIVE | Noted: 2018-02-24

## 2021-10-18 PROCEDURE — 1036F TOBACCO NON-USER: CPT | Performed by: INTERNAL MEDICINE

## 2021-10-18 PROCEDURE — 99214 OFFICE O/P EST MOD 30 MIN: CPT | Performed by: INTERNAL MEDICINE

## 2021-10-18 PROCEDURE — 3008F BODY MASS INDEX DOCD: CPT | Performed by: INTERNAL MEDICINE

## 2021-10-18 PROCEDURE — 3079F DIAST BP 80-89 MM HG: CPT | Performed by: INTERNAL MEDICINE

## 2021-10-18 PROCEDURE — 3077F SYST BP >= 140 MM HG: CPT | Performed by: INTERNAL MEDICINE

## 2021-10-18 RX ORDER — INSULIN GLARGINE 100 [IU]/ML
60 INJECTION, SOLUTION SUBCUTANEOUS
Qty: 15 ML | Refills: 0 | Status: SHIPPED | OUTPATIENT
Start: 2021-10-18 | End: 2021-11-12

## 2021-10-18 RX ORDER — INSULIN ASPART 100 [IU]/ML
20 INJECTION, SOLUTION INTRAVENOUS; SUBCUTANEOUS
Qty: 15 ML | Refills: 0 | Status: SHIPPED | OUTPATIENT
Start: 2021-10-18 | End: 2021-11-12

## 2021-10-18 RX ORDER — DULAGLUTIDE 0.75 MG/.5ML
0.75 INJECTION, SOLUTION SUBCUTANEOUS WEEKLY
Qty: 2 ML | Refills: 3 | Status: ON HOLD | OUTPATIENT
Start: 2021-10-18 | End: 2022-02-21 | Stop reason: SDUPTHER

## 2021-10-18 RX ORDER — IBUPROFEN 200 MG
TABLET ORAL EVERY 6 HOURS PRN
COMMUNITY
End: 2022-02-21 | Stop reason: HOSPADM

## 2021-10-26 ENCOUNTER — VBI (OUTPATIENT)
Dept: ADMINISTRATIVE | Facility: OTHER | Age: 61
End: 2021-10-26

## 2021-11-04 DIAGNOSIS — IMO0002 UNCONTROLLED TYPE 2 DIABETES WITH NEUROPATHY: ICD-10-CM

## 2021-11-04 DIAGNOSIS — F32.A DEPRESSION, UNSPECIFIED DEPRESSION TYPE: ICD-10-CM

## 2021-11-04 DIAGNOSIS — F41.9 ANXIETY: ICD-10-CM

## 2021-11-04 RX ORDER — GABAPENTIN 100 MG/1
CAPSULE ORAL
Qty: 90 CAPSULE | Refills: 0 | Status: SHIPPED | OUTPATIENT
Start: 2021-11-04 | End: 2021-11-18

## 2021-11-12 DIAGNOSIS — IMO0002 UNCONTROLLED TYPE 2 DIABETES WITH NEUROPATHY: ICD-10-CM

## 2021-11-12 DIAGNOSIS — I10 HYPERTENSION, UNSPECIFIED TYPE: ICD-10-CM

## 2021-11-12 RX ORDER — INSULIN GLARGINE 100 [IU]/ML
60 INJECTION, SOLUTION SUBCUTANEOUS
Qty: 15 ML | Refills: 1 | Status: SHIPPED | OUTPATIENT
Start: 2021-11-12 | End: 2021-11-18

## 2021-11-12 RX ORDER — INSULIN ASPART 100 [IU]/ML
20 INJECTION, SOLUTION INTRAVENOUS; SUBCUTANEOUS
Qty: 15 ML | Refills: 1 | Status: SHIPPED | OUTPATIENT
Start: 2021-11-12 | End: 2022-01-14

## 2021-11-13 RX ORDER — METOPROLOL SUCCINATE 25 MG/1
TABLET, EXTENDED RELEASE ORAL
Qty: 90 TABLET | Refills: 0 | Status: SHIPPED | OUTPATIENT
Start: 2021-11-13 | End: 2022-04-15 | Stop reason: SDUPTHER

## 2021-11-18 DIAGNOSIS — IMO0002 UNCONTROLLED TYPE 2 DIABETES WITH NEUROPATHY: ICD-10-CM

## 2021-11-18 RX ORDER — INSULIN GLARGINE 100 [IU]/ML
INJECTION, SOLUTION SUBCUTANEOUS
Qty: 15 ML | Refills: 3 | Status: ON HOLD | OUTPATIENT
Start: 2021-11-18 | End: 2022-02-21 | Stop reason: SDUPTHER

## 2021-11-18 RX ORDER — GABAPENTIN 100 MG/1
CAPSULE ORAL
Qty: 90 CAPSULE | Refills: 0 | Status: SHIPPED | OUTPATIENT
Start: 2021-11-18 | End: 2021-12-21 | Stop reason: SDUPTHER

## 2021-11-22 ENCOUNTER — RA CDI HCC (OUTPATIENT)
Dept: OTHER | Facility: HOSPITAL | Age: 61
End: 2021-11-22

## 2021-11-22 PROBLEM — F33.9 DEPRESSION, RECURRENT (HCC): Status: ACTIVE | Noted: 2018-03-02

## 2021-11-29 ENCOUNTER — RA CDI HCC (OUTPATIENT)
Dept: OTHER | Facility: HOSPITAL | Age: 61
End: 2021-11-29

## 2021-12-03 DIAGNOSIS — I10 HYPERTENSION, UNSPECIFIED TYPE: ICD-10-CM

## 2021-12-06 RX ORDER — LISINOPRIL 20 MG/1
TABLET ORAL
Qty: 90 TABLET | Refills: 0 | Status: SHIPPED | OUTPATIENT
Start: 2021-12-06 | End: 2021-12-21 | Stop reason: DRUGHIGH

## 2021-12-07 ENCOUNTER — VBI (OUTPATIENT)
Dept: ADMINISTRATIVE | Facility: OTHER | Age: 61
End: 2021-12-07

## 2021-12-15 ENCOUNTER — VBI (OUTPATIENT)
Dept: ADMINISTRATIVE | Facility: OTHER | Age: 61
End: 2021-12-15

## 2021-12-21 ENCOUNTER — OFFICE VISIT (OUTPATIENT)
Dept: FAMILY MEDICINE CLINIC | Facility: CLINIC | Age: 61
End: 2021-12-21
Payer: COMMERCIAL

## 2021-12-21 VITALS
DIASTOLIC BLOOD PRESSURE: 78 MMHG | HEART RATE: 98 BPM | HEIGHT: 63 IN | BODY MASS INDEX: 41.67 KG/M2 | TEMPERATURE: 98.7 F | WEIGHT: 235.2 LBS | SYSTOLIC BLOOD PRESSURE: 138 MMHG | OXYGEN SATURATION: 96 %

## 2021-12-21 DIAGNOSIS — IMO0002 UNCONTROLLED TYPE 2 DIABETES WITH NEUROPATHY: ICD-10-CM

## 2021-12-21 DIAGNOSIS — Z12.31 ENCOUNTER FOR SCREENING MAMMOGRAM FOR MALIGNANT NEOPLASM OF BREAST: ICD-10-CM

## 2021-12-21 DIAGNOSIS — D72.829 LEUKOCYTOSIS, UNSPECIFIED TYPE: ICD-10-CM

## 2021-12-21 DIAGNOSIS — Z86.010 HISTORY OF COLON POLYPS: ICD-10-CM

## 2021-12-21 DIAGNOSIS — F41.9 ANXIETY: ICD-10-CM

## 2021-12-21 DIAGNOSIS — Z12.11 SCREENING FOR COLON CANCER: ICD-10-CM

## 2021-12-21 DIAGNOSIS — F41.0 PANIC ATTACK: ICD-10-CM

## 2021-12-21 DIAGNOSIS — I10 HYPERTENSION, UNSPECIFIED TYPE: Primary | ICD-10-CM

## 2021-12-21 DIAGNOSIS — Z11.4 SCREENING FOR HIV (HUMAN IMMUNODEFICIENCY VIRUS): ICD-10-CM

## 2021-12-21 DIAGNOSIS — F33.9 DEPRESSION, RECURRENT (HCC): ICD-10-CM

## 2021-12-21 DIAGNOSIS — E78.2 MIXED HYPERLIPIDEMIA: ICD-10-CM

## 2021-12-21 DIAGNOSIS — E11.42 DIABETIC PERIPHERAL NEUROPATHY (HCC): ICD-10-CM

## 2021-12-21 PROCEDURE — 99214 OFFICE O/P EST MOD 30 MIN: CPT | Performed by: NURSE PRACTITIONER

## 2021-12-21 RX ORDER — MULTIVITAMIN
1 TABLET ORAL DAILY
COMMUNITY

## 2021-12-21 RX ORDER — GABAPENTIN 100 MG/1
200 CAPSULE ORAL 2 TIMES DAILY
Qty: 360 CAPSULE | Refills: 0 | Status: SHIPPED | OUTPATIENT
Start: 2021-12-21 | End: 2022-02-08

## 2021-12-21 RX ORDER — CHOLECALCIFEROL (VITAMIN D3) 25 MCG
2000 CAPSULE ORAL DAILY
COMMUNITY

## 2021-12-22 NOTE — PROGRESS NOTES
[facility]    ASSESSMENT AND PLAN     1  Hypertension, unspecified type  Stable  Asymptomatic  Compliant with Amlodipine 10, Lisinopril 40 and Toprol 25  No change in treatment at this time  No refills needed today    2  Uncontrolled type 2 diabetes with neuropathy ([facility] )  Managed by Endocrinology    - gabapentin (NEURONTIN) 100 mg capsule; Take 2 capsules (200 mg total) by mouth 2 (two) times a day  Dispense: 360 capsule; Refill: 0    3  Anxiety  Stable  Feels well controlled on current Sertraline 100  Continue to monitor  No refill needed today    4  Panic attack  Occasional - situational  PRN Xanax    5  Depression, recurrent ([facility] )  Stable  Feels well controlled on current Sertraline 100  Continue to monitor      6  Mixed hyperlipidemia  Last checked August  Total 225,   Trigs 275  Has been compliant with  Atorvastatin 40  Encourage healthy lifestyle - diet/exercise  Fasting repeat orders placed  - Lipid panel; Future    7  Diabetic peripheral neuropathy (HCC)  Stable  Well controlled with Gabapentin  Currently taking 200, twice daily  Refill given today for 90 days    Continue routine skin checks/foot monitoring  Avoid barefoot - use hard soled shoes  Good nail care - recommend podiatry    - gabapentin (NEURONTIN) 100 mg capsule; Take 2 capsules (200 mg total) by mouth 2 (two) times a day  Dispense: 360 capsule; Refill: 0    8  Leukocytosis, unspecified type  Repeat CBC with next labs  WBC last draw in August 10 8    - CBC and differential; Future    9  Screening for HIV (human immunodeficiency virus)  Recommended screening    - HIV 1/2 Antigen/Antibody (4th Generation) w Reflex [facility]; Future    10  Screening for colon cancer  Last colonoscopy 2/2020  Advised one year follow up  Encouraged compliance    - Ambulatory referral for colonoscopy; Future    11  History of colon polyps    - Ambulatory referral for colonoscopy; Future    12   Encounter for screening mammogram for malignant neoplasm of breast  Over due  Declines Women's health (gyn and mammo)  Risks/benefits discussed  Encouraged compliance  Order placed  Pt to consider    - Mammo screening bilateral w 3d & cad; Future            SUBJECTIVE       Patient ID: Reuben Palencia is a 62 y o  female  Chief Complaint   Patient presents with    Follow-up     Follow up for chronic conditions       HISTORY OF PRESENT ILLNESS    Presents for follow up to chronic conditions  No new complaints  Feels relatively well        The following portions of the patient's history were reviewed and updated as appropriate: allergies, current medications, past family history, past medical history, past social history, past surgical history and problem list     REVIEW OF SYSTEMS  Review of Systems   Constitutional: Negative  HENT: Negative  Respiratory: Negative  Cardiovascular: Negative  Gastrointestinal: Negative  Genitourinary: Negative  Neurological: Positive for tremors (c/o chronic intermittent tremors)  Negative for dizziness and headaches  Psychiatric/Behavioral: Negative  Negative for dysphoric mood, self-injury, sleep disturbance and suicidal ideas  The patient is not nervous/anxious          OBJECTIVE      VITAL SIGNS  /78 (BP Location: Left arm)   Pulse 98   Temp 98 7 °F (37 1 °C)   Ht 5' 3 39" (1 61 m)   Wt 107 kg (235 lb 3 2 oz)   SpO2 96%   BMI 41 16 kg/m²     CURRENT MEDICATIONS    Current Outpatient Medications:     ALPRAZolam (XANAX) 0 25 mg tablet, TAKE 1 TABLET BY MOUTH DAILY AT BEDTIME AS NEEDED FOR ANXIETY, Disp: 10 tablet, Rfl: 0    amLODIPine (NORVASC) 10 mg tablet, TAKE 1 TABLET BY MOUTH EVERY DAY, Disp: 90 tablet, Rfl: 1    aspirin 81 mg chewable tablet, Chew 81 mg, Disp: , Rfl:     atorvastatin (LIPITOR) 40 mg tablet, TAKE 1 TABLET BY MOUTH EVERY DAY, Disp: 90 tablet, Rfl: 1    Cholecalciferol (Vitamin D-3) 25 MCG (1000 UT) CAPS, Take 2,000 Units by mouth daily, Disp: , Rfl:     Continuous Blood Gluc  (FreeStyle [place] 14 Day [place]) LITO, Use 1 Units continuous, Disp: 1 each, Rfl: 0    Continuous Blood Gluc Sensor (FreeStyle Noé 14 Day Sensor) MISC, Use 1 Units every 14 (fourteen) days, Disp: 2 each, Rfl: 6    Doxylamine Succinate, Sleep, (UNISOM PO), Take by mouth, Disp: , Rfl:     Dulaglutide (Trulicity) 4 31 SA/9 6RL SOPN, Inject 0 5 mL (0 75 mg total) under the skin once a week, Disp: 2 mL, Rfl: 3    gabapentin (NEURONTIN) 100 mg capsule, Take 2 capsules (200 mg total) by mouth 2 (two) times a day, Disp: 360 capsule, Rfl: 0    ibuprofen (MOTRIN) 200 mg tablet, Take by mouth every 6 (six) hours as needed for mild pain, Disp: , Rfl:     insulin aspart (NovoLOG FlexPen) 100 UNIT/ML injection pen, INJECT 20 UNITS UNDER THE SKIN 3 (THREE) TIMES A DAY WITH MEALS, Disp: 15 mL, Rfl: 1    Insulin Pen Needle (Pen Needles) 32G X 5 MM MISC, Use with insulin four times daily, Disp: 100 each, Rfl: 3    Lantus SoloStar 100 units/mL injection pen, INJECT 20 UNITS UNDER THE SKIN DAILY AT BEDTIME  INC BY 1 UNIT DAILY UNTIL FASTING GLUCOSE OVER 150 (Patient taking differently: 60 Units daily at bedtime  ), Disp: 15 mL, Rfl: 3    lisinopril (ZESTRIL) 40 mg tablet, TAKE 1 TABLET BY MOUTH EVERY DAY, Disp: 90 tablet, Rfl: 1    metoprolol succinate (TOPROL-XL) 25 mg 24 hr tablet, TAKE 1 TABLET BY MOUTH EVERY DAY, Disp: 90 tablet, Rfl: 0    Multiple Vitamin (multivitamin) tablet, Take 1 tablet by mouth daily, Disp: , Rfl:     sertraline (ZOLOFT) 100 mg tablet, Take 1 tablet (100 mg total) by mouth daily, Disp: 30 tablet, Rfl: 2    glucose blood (True Metrix Blood Glucose Test) test strip, Use 1 each 4 (four) times a day (Patient not taking: Reported on 10/18/2021), Disp: 400 strip, Rfl: 0    Lancets 28G MISC, Use 1 each 4 (four) times a day (Patient not taking: Reported on 12/21/2021 ), Disp: 400 each, Rfl: 0      PHYSICAL EXAMINATION   Physical Exam  Vitals and nursing note reviewed     Constitutional: General: She is not in acute distress  Appearance: Normal appearance  She is well-developed and well-groomed  She is not ill-appearing  HENT:      Head: Normocephalic and atraumatic  Right Ear: Tympanic membrane, ear canal and external ear normal       Left Ear: Tympanic membrane, ear canal and external ear normal    Eyes:      Conjunctiva/sclera: Conjunctivae normal       Pupils: Pupils are equal, round, and reactive to light  Neck:      Vascular: No carotid bruit  Cardiovascular:      Rate and Rhythm: Normal rate and regular rhythm  Pulmonary:      Effort: Pulmonary effort is normal  No respiratory distress  Breath sounds: Normal breath sounds and air entry  Abdominal:      Tenderness: There is no abdominal tenderness  Musculoskeletal:      Right lower leg: No edema  Left lower leg: No edema  Lymphadenopathy:      Cervical: No cervical adenopathy  Skin:     General: Skin is warm and dry  Neurological:      General: No focal deficit present  Mental Status: She is alert and oriented to person, place, and time  Comments: No tremor noted today  Pt states chronic/intermittent for years  Discussed Neuro eval  Pt declines at this time  Will continue to monitor   Psychiatric:         Attention and Perception: Attention normal          Mood and Affect: Mood normal          Speech: Speech normal          Behavior: Behavior normal          Thought Content:  Thought content normal

## 2022-01-10 ENCOUNTER — VBI (OUTPATIENT)
Dept: ADMINISTRATIVE | Facility: OTHER | Age: 62
End: 2022-01-10

## 2022-01-14 DIAGNOSIS — IMO0002 UNCONTROLLED TYPE 2 DIABETES WITH NEUROPATHY: ICD-10-CM

## 2022-01-14 RX ORDER — INSULIN ASPART 100 [IU]/ML
INJECTION, SOLUTION INTRAVENOUS; SUBCUTANEOUS
Qty: 15 ML | Refills: 1 | Status: SHIPPED | OUTPATIENT
Start: 2022-01-14 | End: 2022-03-30 | Stop reason: SDUPTHER

## 2022-01-24 ENCOUNTER — CLINICAL SUPPORT (OUTPATIENT)
Dept: FAMILY MEDICINE CLINIC | Facility: CLINIC | Age: 62
End: 2022-01-24
Payer: COMMERCIAL

## 2022-01-24 DIAGNOSIS — Z23 NEED FOR VACCINATION: Primary | ICD-10-CM

## 2022-01-24 PROCEDURE — 90471 IMMUNIZATION ADMIN: CPT | Performed by: NURSE PRACTITIONER

## 2022-01-24 PROCEDURE — 90750 HZV VACC RECOMBINANT IM: CPT | Performed by: NURSE PRACTITIONER

## 2022-01-27 ENCOUNTER — TELEPHONE (OUTPATIENT)
Dept: GASTROENTEROLOGY | Facility: MEDICAL CENTER | Age: 62
End: 2022-01-27

## 2022-02-05 DIAGNOSIS — I10 BENIGN ESSENTIAL HYPERTENSION: ICD-10-CM

## 2022-02-05 DIAGNOSIS — F41.9 ANXIETY: ICD-10-CM

## 2022-02-07 DIAGNOSIS — E78.2 MIXED HYPERLIPIDEMIA: ICD-10-CM

## 2022-02-07 DIAGNOSIS — E11.42 DIABETIC PERIPHERAL NEUROPATHY (HCC): ICD-10-CM

## 2022-02-07 DIAGNOSIS — IMO0002 UNCONTROLLED TYPE 2 DIABETES WITH NEUROPATHY: ICD-10-CM

## 2022-02-07 RX ORDER — AMLODIPINE BESYLATE 10 MG/1
TABLET ORAL
Qty: 90 TABLET | Refills: 1 | Status: SHIPPED | OUTPATIENT
Start: 2022-02-07

## 2022-02-07 RX ORDER — SERTRALINE HYDROCHLORIDE 100 MG/1
TABLET, FILM COATED ORAL
Qty: 90 TABLET | Refills: 0 | Status: SHIPPED | OUTPATIENT
Start: 2022-02-07 | End: 2022-05-04

## 2022-02-08 RX ORDER — ATORVASTATIN CALCIUM 40 MG/1
TABLET, FILM COATED ORAL
Qty: 90 TABLET | Refills: 1 | Status: SHIPPED | OUTPATIENT
Start: 2022-02-08

## 2022-02-08 RX ORDER — GABAPENTIN 100 MG/1
CAPSULE ORAL
Qty: 360 CAPSULE | Refills: 0 | Status: SHIPPED | OUTPATIENT
Start: 2022-02-08 | End: 2022-05-25

## 2022-02-20 ENCOUNTER — HOSPITAL ENCOUNTER (OUTPATIENT)
Facility: HOSPITAL | Age: 62
Setting detail: OBSERVATION
Discharge: HOME | End: 2022-02-21
Attending: EMERGENCY MEDICINE | Admitting: INTERNAL MEDICINE
Payer: COMMERCIAL

## 2022-02-20 ENCOUNTER — APPOINTMENT (EMERGENCY)
Dept: CT IMAGING | Facility: HOSPITAL | Age: 62
End: 2022-02-20
Payer: COMMERCIAL

## 2022-02-20 DIAGNOSIS — R10.13 EPIGASTRIC PAIN: ICD-10-CM

## 2022-02-20 DIAGNOSIS — E11.65 TYPE 2 DIABETES MELLITUS WITH HYPERGLYCEMIA, WITH LONG-TERM CURRENT USE OF INSULIN (HCC): ICD-10-CM

## 2022-02-20 DIAGNOSIS — IMO0002 UNCONTROLLED TYPE 2 DIABETES WITH NEUROPATHY: ICD-10-CM

## 2022-02-20 DIAGNOSIS — R77.8 ELEVATED TROPONIN: ICD-10-CM

## 2022-02-20 DIAGNOSIS — R11.2 NAUSEA & VOMITING: Primary | ICD-10-CM

## 2022-02-20 DIAGNOSIS — E11.42 DIABETIC PERIPHERAL NEUROPATHY (HCC): ICD-10-CM

## 2022-02-20 DIAGNOSIS — Z79.4 TYPE 2 DIABETES MELLITUS WITH HYPERGLYCEMIA, WITH LONG-TERM CURRENT USE OF INSULIN (HCC): ICD-10-CM

## 2022-02-20 DIAGNOSIS — E87.6 HYPOKALEMIA: ICD-10-CM

## 2022-02-20 LAB
2HR DELTA HS TROPONIN: 5 NG/L
ALBUMIN SERPL BCP-MCNC: 2.5 G/DL (ref 3.5–5)
ALP SERPL-CCNC: 134 U/L (ref 46–116)
ALT SERPL W P-5'-P-CCNC: 29 U/L (ref 12–78)
ANION GAP SERPL CALCULATED.3IONS-SCNC: 12 MMOL/L (ref 4–13)
AST SERPL W P-5'-P-CCNC: 34 U/L (ref 5–45)
ATRIAL RATE: 105 BPM
ATRIAL RATE: 88 BPM
BASOPHILS # BLD AUTO: 0.05 THOUSANDS/ΜL (ref 0–0.1)
BASOPHILS NFR BLD AUTO: 0 % (ref 0–1)
BILIRUB SERPL-MCNC: 0.53 MG/DL (ref 0.2–1)
BUN SERPL-MCNC: 12 MG/DL (ref 5–25)
CALCIUM ALBUM COR SERPL-MCNC: 9.8 MG/DL (ref 8.3–10.1)
CALCIUM SERPL-MCNC: 8.6 MG/DL (ref 8.3–10.1)
CARDIAC TROPONIN I PNL SERPL HS: 19 NG/L
CARDIAC TROPONIN I PNL SERPL HS: 24 NG/L
CHLORIDE SERPL-SCNC: 96 MMOL/L (ref 100–108)
CO2 SERPL-SCNC: 26 MMOL/L (ref 21–32)
CREAT SERPL-MCNC: 1 MG/DL (ref 0.6–1.3)
EOSINOPHIL # BLD AUTO: 0 THOUSAND/ΜL (ref 0–0.61)
EOSINOPHIL NFR BLD AUTO: 0 % (ref 0–6)
ERYTHROCYTE [DISTWIDTH] IN BLOOD BY AUTOMATED COUNT: 14.1 % (ref 11.6–15.1)
GFR SERPL CREATININE-BSD FRML MDRD: 61 ML/MIN/1.73SQ M
GLUCOSE SERPL-MCNC: 310 MG/DL (ref 65–140)
GLUCOSE SERPL-MCNC: 327 MG/DL (ref 65–140)
HCT VFR BLD AUTO: 42.1 % (ref 34.8–46.1)
HGB BLD-MCNC: 13.8 G/DL (ref 11.5–15.4)
IMM GRANULOCYTES # BLD AUTO: 0.17 THOUSAND/UL (ref 0–0.2)
IMM GRANULOCYTES NFR BLD AUTO: 1 % (ref 0–2)
LIPASE SERPL-CCNC: 68 U/L (ref 73–393)
LYMPHOCYTES # BLD AUTO: 0.81 THOUSANDS/ΜL (ref 0.6–4.47)
LYMPHOCYTES NFR BLD AUTO: 6 % (ref 14–44)
MCH RBC QN AUTO: 27.3 PG (ref 26.8–34.3)
MCHC RBC AUTO-ENTMCNC: 32.8 G/DL (ref 31.4–37.4)
MCV RBC AUTO: 83 FL (ref 82–98)
MONOCYTES # BLD AUTO: 0.67 THOUSAND/ΜL (ref 0.17–1.22)
MONOCYTES NFR BLD AUTO: 5 % (ref 4–12)
NEUTROPHILS # BLD AUTO: 11.75 THOUSANDS/ΜL (ref 1.85–7.62)
NEUTS SEG NFR BLD AUTO: 88 % (ref 43–75)
NRBC BLD AUTO-RTO: 0 /100 WBCS
P AXIS: 65 DEGREES
P AXIS: 74 DEGREES
PLATELET # BLD AUTO: 284 THOUSANDS/UL (ref 149–390)
PMV BLD AUTO: 8.7 FL (ref 8.9–12.7)
POTASSIUM SERPL-SCNC: 3.1 MMOL/L (ref 3.5–5.3)
PR INTERVAL: 120 MS
PR INTERVAL: 140 MS
PROT SERPL-MCNC: 7 G/DL (ref 6.4–8.2)
QRS AXIS: 21 DEGREES
QRS AXIS: 40 DEGREES
QRSD INTERVAL: 76 MS
QRSD INTERVAL: 82 MS
QT INTERVAL: 352 MS
QT INTERVAL: 380 MS
QTC INTERVAL: 459 MS
QTC INTERVAL: 465 MS
RBC # BLD AUTO: 5.05 MILLION/UL (ref 3.81–5.12)
SODIUM SERPL-SCNC: 134 MMOL/L (ref 136–145)
T WAVE AXIS: 67 DEGREES
T WAVE AXIS: 82 DEGREES
VENTRICULAR RATE: 105 BPM
VENTRICULAR RATE: 88 BPM
WBC # BLD AUTO: 13.45 THOUSAND/UL (ref 4.31–10.16)

## 2022-02-20 PROCEDURE — 80053 COMPREHEN METABOLIC PANEL: CPT | Performed by: EMERGENCY MEDICINE

## 2022-02-20 PROCEDURE — 84484 ASSAY OF TROPONIN QUANT: CPT | Performed by: EMERGENCY MEDICINE

## 2022-02-20 PROCEDURE — 82948 REAGENT STRIP/BLOOD GLUCOSE: CPT

## 2022-02-20 PROCEDURE — 93005 ELECTROCARDIOGRAM TRACING: CPT

## 2022-02-20 PROCEDURE — 93010 ELECTROCARDIOGRAM REPORT: CPT | Performed by: INTERNAL MEDICINE

## 2022-02-20 PROCEDURE — 96366 THER/PROPH/DIAG IV INF ADDON: CPT

## 2022-02-20 PROCEDURE — 83690 ASSAY OF LIPASE: CPT | Performed by: EMERGENCY MEDICINE

## 2022-02-20 PROCEDURE — 74177 CT ABD & PELVIS W/CONTRAST: CPT

## 2022-02-20 PROCEDURE — 96375 TX/PRO/DX INJ NEW DRUG ADDON: CPT

## 2022-02-20 PROCEDURE — 99285 EMERGENCY DEPT VISIT HI MDM: CPT

## 2022-02-20 PROCEDURE — 99220 PR INITIAL OBSERVATION CARE/DAY 70 MINUTES: CPT | Performed by: PHYSICIAN ASSISTANT

## 2022-02-20 PROCEDURE — 36415 COLL VENOUS BLD VENIPUNCTURE: CPT | Performed by: EMERGENCY MEDICINE

## 2022-02-20 PROCEDURE — 96365 THER/PROPH/DIAG IV INF INIT: CPT

## 2022-02-20 PROCEDURE — 85025 COMPLETE CBC W/AUTO DIFF WBC: CPT | Performed by: EMERGENCY MEDICINE

## 2022-02-20 PROCEDURE — G1004 CDSM NDSC: HCPCS

## 2022-02-20 PROCEDURE — 99285 EMERGENCY DEPT VISIT HI MDM: CPT | Performed by: EMERGENCY MEDICINE

## 2022-02-20 RX ORDER — ACETAMINOPHEN 325 MG/1
650 TABLET ORAL EVERY 6 HOURS PRN
Status: DISCONTINUED | OUTPATIENT
Start: 2022-02-20 | End: 2022-02-21 | Stop reason: HOSPADM

## 2022-02-20 RX ORDER — METOPROLOL SUCCINATE 25 MG/1
25 TABLET, EXTENDED RELEASE ORAL DAILY
Status: DISCONTINUED | OUTPATIENT
Start: 2022-02-21 | End: 2022-02-21 | Stop reason: HOSPADM

## 2022-02-20 RX ORDER — ALPRAZOLAM 0.25 MG/1
0.25 TABLET ORAL
Status: DISCONTINUED | OUTPATIENT
Start: 2022-02-20 | End: 2022-02-21 | Stop reason: HOSPADM

## 2022-02-20 RX ORDER — SERTRALINE HYDROCHLORIDE 100 MG/1
100 TABLET, FILM COATED ORAL DAILY
Status: DISCONTINUED | OUTPATIENT
Start: 2022-02-21 | End: 2022-02-21 | Stop reason: HOSPADM

## 2022-02-20 RX ORDER — ONDANSETRON 2 MG/ML
4 INJECTION INTRAMUSCULAR; INTRAVENOUS EVERY 6 HOURS PRN
Status: DISCONTINUED | OUTPATIENT
Start: 2022-02-20 | End: 2022-02-20

## 2022-02-20 RX ORDER — METOCLOPRAMIDE HYDROCHLORIDE 5 MG/ML
10 INJECTION INTRAMUSCULAR; INTRAVENOUS EVERY 6 HOURS PRN
Status: DISCONTINUED | OUTPATIENT
Start: 2022-02-20 | End: 2022-02-21 | Stop reason: HOSPADM

## 2022-02-20 RX ORDER — ASPIRIN 81 MG/1
81 TABLET, CHEWABLE ORAL DAILY
Status: DISCONTINUED | OUTPATIENT
Start: 2022-02-21 | End: 2022-02-21 | Stop reason: HOSPADM

## 2022-02-20 RX ORDER — ATORVASTATIN CALCIUM 40 MG/1
40 TABLET, FILM COATED ORAL
Status: DISCONTINUED | OUTPATIENT
Start: 2022-02-20 | End: 2022-02-21 | Stop reason: HOSPADM

## 2022-02-20 RX ORDER — ONDANSETRON 2 MG/ML
4 INJECTION INTRAMUSCULAR; INTRAVENOUS ONCE
Status: COMPLETED | OUTPATIENT
Start: 2022-02-20 | End: 2022-02-20

## 2022-02-20 RX ORDER — SODIUM CHLORIDE, SODIUM GLUCONATE, SODIUM ACETATE, POTASSIUM CHLORIDE, MAGNESIUM CHLORIDE, SODIUM PHOSPHATE, DIBASIC, AND POTASSIUM PHOSPHATE .53; .5; .37; .037; .03; .012; .00082 G/100ML; G/100ML; G/100ML; G/100ML; G/100ML; G/100ML; G/100ML
1000 INJECTION, SOLUTION INTRAVENOUS ONCE
Status: COMPLETED | OUTPATIENT
Start: 2022-02-20 | End: 2022-02-21

## 2022-02-20 RX ORDER — AMLODIPINE BESYLATE 10 MG/1
10 TABLET ORAL DAILY
Status: DISCONTINUED | OUTPATIENT
Start: 2022-02-21 | End: 2022-02-21 | Stop reason: HOSPADM

## 2022-02-20 RX ORDER — DICYCLOMINE HCL 20 MG
20 TABLET ORAL ONCE
Status: COMPLETED | OUTPATIENT
Start: 2022-02-20 | End: 2022-02-20

## 2022-02-20 RX ORDER — INSULIN GLARGINE 100 [IU]/ML
25 INJECTION, SOLUTION SUBCUTANEOUS ONCE
Status: COMPLETED | OUTPATIENT
Start: 2022-02-20 | End: 2022-02-21

## 2022-02-20 RX ORDER — GABAPENTIN 100 MG/1
200 CAPSULE ORAL 2 TIMES DAILY
Status: DISCONTINUED | OUTPATIENT
Start: 2022-02-21 | End: 2022-02-21 | Stop reason: HOSPADM

## 2022-02-20 RX ORDER — POTASSIUM CHLORIDE 20 MEQ/1
40 TABLET, EXTENDED RELEASE ORAL ONCE
Status: COMPLETED | OUTPATIENT
Start: 2022-02-20 | End: 2022-02-20

## 2022-02-20 RX ORDER — METOCLOPRAMIDE HYDROCHLORIDE 5 MG/ML
10 INJECTION INTRAMUSCULAR; INTRAVENOUS ONCE
Status: COMPLETED | OUTPATIENT
Start: 2022-02-20 | End: 2022-02-20

## 2022-02-20 RX ORDER — SODIUM CHLORIDE 9 MG/ML
100 INJECTION, SOLUTION INTRAVENOUS CONTINUOUS
Status: DISPENSED | OUTPATIENT
Start: 2022-02-20 | End: 2022-02-21

## 2022-02-20 RX ORDER — PANTOPRAZOLE SODIUM 40 MG/1
40 INJECTION, POWDER, FOR SOLUTION INTRAVENOUS
Status: DISCONTINUED | OUTPATIENT
Start: 2022-02-20 | End: 2022-02-21 | Stop reason: HOSPADM

## 2022-02-20 RX ORDER — MAGNESIUM HYDROXIDE/ALUMINUM HYDROXICE/SIMETHICONE 120; 1200; 1200 MG/30ML; MG/30ML; MG/30ML
30 SUSPENSION ORAL ONCE
Status: COMPLETED | OUTPATIENT
Start: 2022-02-20 | End: 2022-02-20

## 2022-02-20 RX ORDER — ONDANSETRON 2 MG/ML
4 INJECTION INTRAMUSCULAR; INTRAVENOUS ONCE
Status: DISCONTINUED | OUTPATIENT
Start: 2022-02-20 | End: 2022-02-20

## 2022-02-20 RX ORDER — LISINOPRIL 20 MG/1
40 TABLET ORAL DAILY
Status: DISCONTINUED | OUTPATIENT
Start: 2022-02-21 | End: 2022-02-21 | Stop reason: HOSPADM

## 2022-02-20 RX ORDER — MAGNESIUM HYDROXIDE/ALUMINUM HYDROXICE/SIMETHICONE 120; 1200; 1200 MG/30ML; MG/30ML; MG/30ML
30 SUSPENSION ORAL EVERY 6 HOURS PRN
Status: DISCONTINUED | OUTPATIENT
Start: 2022-02-20 | End: 2022-02-21 | Stop reason: HOSPADM

## 2022-02-20 RX ADMIN — ONDANSETRON 4 MG: 2 INJECTION INTRAMUSCULAR; INTRAVENOUS at 19:01

## 2022-02-20 RX ADMIN — DICYCLOMINE HYDROCHLORIDE 20 MG: 20 TABLET ORAL at 19:00

## 2022-02-20 RX ADMIN — METOCLOPRAMIDE 10 MG: 5 INJECTION, SOLUTION INTRAMUSCULAR; INTRAVENOUS at 19:38

## 2022-02-20 RX ADMIN — POTASSIUM CHLORIDE 40 MEQ: 1500 TABLET, EXTENDED RELEASE ORAL at 21:15

## 2022-02-20 RX ADMIN — SODIUM CHLORIDE, SODIUM GLUCONATE, SODIUM ACETATE, POTASSIUM CHLORIDE, MAGNESIUM CHLORIDE, SODIUM PHOSPHATE, DIBASIC, AND POTASSIUM PHOSPHATE 1000 ML: .53; .5; .37; .037; .03; .012; .00082 INJECTION, SOLUTION INTRAVENOUS at 19:04

## 2022-02-20 RX ADMIN — ALUMINUM HYDROXIDE, MAGNESIUM HYDROXIDE, AND SIMETHICONE 30 ML: 200; 200; 20 SUSPENSION ORAL at 19:40

## 2022-02-20 RX ADMIN — IOHEXOL 100 ML: 350 INJECTION, SOLUTION INTRAVENOUS at 20:16

## 2022-02-21 VITALS
OXYGEN SATURATION: 95 % | RESPIRATION RATE: 20 BRPM | SYSTOLIC BLOOD PRESSURE: 153 MMHG | WEIGHT: 227.96 LBS | HEART RATE: 82 BPM | DIASTOLIC BLOOD PRESSURE: 71 MMHG | TEMPERATURE: 98 F | HEIGHT: 63 IN | BODY MASS INDEX: 40.39 KG/M2

## 2022-02-21 LAB
4HR DELTA HS TROPONIN: 1 NG/L
ANION GAP SERPL CALCULATED.3IONS-SCNC: 9 MMOL/L (ref 4–13)
BASOPHILS # BLD AUTO: 0.03 THOUSANDS/ΜL (ref 0–0.1)
BASOPHILS NFR BLD AUTO: 0 % (ref 0–1)
BUN SERPL-MCNC: 16 MG/DL (ref 5–25)
CALCIUM SERPL-MCNC: 8.2 MG/DL (ref 8.3–10.1)
CARDIAC TROPONIN I PNL SERPL HS: 20 NG/L
CHLORIDE SERPL-SCNC: 101 MMOL/L (ref 100–108)
CO2 SERPL-SCNC: 28 MMOL/L (ref 21–32)
CREAT SERPL-MCNC: 1.03 MG/DL (ref 0.6–1.3)
EOSINOPHIL # BLD AUTO: 0.05 THOUSAND/ΜL (ref 0–0.61)
EOSINOPHIL NFR BLD AUTO: 1 % (ref 0–6)
ERYTHROCYTE [DISTWIDTH] IN BLOOD BY AUTOMATED COUNT: 14.2 % (ref 11.6–15.1)
GFR SERPL CREATININE-BSD FRML MDRD: 59 ML/MIN/1.73SQ M
GLUCOSE P FAST SERPL-MCNC: 167 MG/DL (ref 65–99)
GLUCOSE SERPL-MCNC: 162 MG/DL (ref 65–140)
GLUCOSE SERPL-MCNC: 167 MG/DL (ref 65–140)
GLUCOSE SERPL-MCNC: 360 MG/DL (ref 65–140)
HCT VFR BLD AUTO: 35.5 % (ref 34.8–46.1)
HGB BLD-MCNC: 11.8 G/DL (ref 11.5–15.4)
IMM GRANULOCYTES # BLD AUTO: 0.1 THOUSAND/UL (ref 0–0.2)
IMM GRANULOCYTES NFR BLD AUTO: 1 % (ref 0–2)
LYMPHOCYTES # BLD AUTO: 2.15 THOUSANDS/ΜL (ref 0.6–4.47)
LYMPHOCYTES NFR BLD AUTO: 20 % (ref 14–44)
MCH RBC QN AUTO: 28 PG (ref 26.8–34.3)
MCHC RBC AUTO-ENTMCNC: 33.2 G/DL (ref 31.4–37.4)
MCV RBC AUTO: 84 FL (ref 82–98)
MONOCYTES # BLD AUTO: 1.17 THOUSAND/ΜL (ref 0.17–1.22)
MONOCYTES NFR BLD AUTO: 11 % (ref 4–12)
NEUTROPHILS # BLD AUTO: 7.2 THOUSANDS/ΜL (ref 1.85–7.62)
NEUTS SEG NFR BLD AUTO: 67 % (ref 43–75)
NRBC BLD AUTO-RTO: 0 /100 WBCS
PLATELET # BLD AUTO: 255 THOUSANDS/UL (ref 149–390)
PLATELET # BLD AUTO: 286 THOUSANDS/UL (ref 149–390)
PMV BLD AUTO: 8.5 FL (ref 8.9–12.7)
PMV BLD AUTO: 8.6 FL (ref 8.9–12.7)
POTASSIUM SERPL-SCNC: 3.2 MMOL/L (ref 3.5–5.3)
RBC # BLD AUTO: 4.22 MILLION/UL (ref 3.81–5.12)
SODIUM SERPL-SCNC: 138 MMOL/L (ref 136–145)
WBC # BLD AUTO: 10.7 THOUSAND/UL (ref 4.31–10.16)

## 2022-02-21 PROCEDURE — C9113 INJ PANTOPRAZOLE SODIUM, VIA: HCPCS | Performed by: PHYSICIAN ASSISTANT

## 2022-02-21 PROCEDURE — 82948 REAGENT STRIP/BLOOD GLUCOSE: CPT

## 2022-02-21 PROCEDURE — 36415 COLL VENOUS BLD VENIPUNCTURE: CPT | Performed by: EMERGENCY MEDICINE

## 2022-02-21 PROCEDURE — 84484 ASSAY OF TROPONIN QUANT: CPT | Performed by: EMERGENCY MEDICINE

## 2022-02-21 PROCEDURE — 85049 AUTOMATED PLATELET COUNT: CPT | Performed by: PHYSICIAN ASSISTANT

## 2022-02-21 PROCEDURE — 85025 COMPLETE CBC W/AUTO DIFF WBC: CPT | Performed by: PHYSICIAN ASSISTANT

## 2022-02-21 PROCEDURE — 80048 BASIC METABOLIC PNL TOTAL CA: CPT | Performed by: PHYSICIAN ASSISTANT

## 2022-02-21 PROCEDURE — 99217 PR OBSERVATION CARE DISCHARGE MANAGEMENT: CPT | Performed by: STUDENT IN AN ORGANIZED HEALTH CARE EDUCATION/TRAINING PROGRAM

## 2022-02-21 RX ORDER — POTASSIUM CHLORIDE 750 MG/1
20 TABLET, EXTENDED RELEASE ORAL 2 TIMES DAILY
Qty: 4 TABLET | Refills: 0 | Status: SHIPPED | OUTPATIENT
Start: 2022-02-21 | End: 2022-02-22

## 2022-02-21 RX ORDER — INSULIN GLARGINE 100 [IU]/ML
60 INJECTION, SOLUTION SUBCUTANEOUS
Start: 2022-02-21 | End: 2022-03-30 | Stop reason: SDUPTHER

## 2022-02-21 RX ORDER — POTASSIUM CHLORIDE 20 MEQ/1
40 TABLET, EXTENDED RELEASE ORAL ONCE
Status: DISCONTINUED | OUTPATIENT
Start: 2022-02-21 | End: 2022-02-21 | Stop reason: HOSPADM

## 2022-02-21 RX ORDER — DULAGLUTIDE 0.75 MG/.5ML
0.75 INJECTION, SOLUTION SUBCUTANEOUS WEEKLY
Start: 2022-02-21 | End: 2022-03-30 | Stop reason: SDUPTHER

## 2022-02-21 RX ORDER — PANTOPRAZOLE SODIUM 40 MG/1
40 TABLET, DELAYED RELEASE ORAL DAILY
Qty: 14 TABLET | Refills: 0 | Status: SHIPPED | OUTPATIENT
Start: 2022-02-21 | End: 2022-03-07

## 2022-02-21 RX ADMIN — INSULIN LISPRO 6 UNITS: 100 INJECTION, SOLUTION INTRAVENOUS; SUBCUTANEOUS at 00:15

## 2022-02-21 RX ADMIN — ASPIRIN 81 MG CHEWABLE TABLET 81 MG: 81 TABLET CHEWABLE at 08:25

## 2022-02-21 RX ADMIN — INSULIN LISPRO 1 UNITS: 100 INJECTION, SOLUTION INTRAVENOUS; SUBCUTANEOUS at 08:26

## 2022-02-21 RX ADMIN — ENOXAPARIN SODIUM 40 MG: 40 INJECTION SUBCUTANEOUS at 08:24

## 2022-02-21 RX ADMIN — INSULIN GLARGINE 25 UNITS: 100 INJECTION, SOLUTION SUBCUTANEOUS at 00:14

## 2022-02-21 RX ADMIN — AMLODIPINE BESYLATE 10 MG: 10 TABLET ORAL at 08:25

## 2022-02-21 RX ADMIN — SERTRALINE HYDROCHLORIDE 100 MG: 100 TABLET, FILM COATED ORAL at 08:25

## 2022-02-21 RX ADMIN — LISINOPRIL 40 MG: 20 TABLET ORAL at 08:25

## 2022-02-21 RX ADMIN — PANTOPRAZOLE SODIUM 40 MG: 40 INJECTION, POWDER, FOR SOLUTION INTRAVENOUS at 00:41

## 2022-02-21 RX ADMIN — METOPROLOL SUCCINATE 25 MG: 25 TABLET, EXTENDED RELEASE ORAL at 08:25

## 2022-02-21 RX ADMIN — SODIUM CHLORIDE 100 ML/HR: 0.9 INJECTION, SOLUTION INTRAVENOUS at 00:18

## 2022-02-21 RX ADMIN — ATORVASTATIN CALCIUM 40 MG: 40 TABLET, FILM COATED ORAL at 00:01

## 2022-02-21 RX ADMIN — GABAPENTIN 200 MG: 100 CAPSULE ORAL at 08:25

## 2022-02-21 RX ADMIN — PANTOPRAZOLE SODIUM 40 MG: 40 INJECTION, POWDER, FOR SOLUTION INTRAVENOUS at 08:25

## 2022-02-21 NOTE — DISCHARGE SUMMARY
[address] [facility]  Discharge- [address] [address] 1960, 62 y o  female MRN: [id number]  Unit/Bed#: E4 -01 Encounter: [id number]  Primary Care Provider: ALEX Coley   Date and time admitted to hospital: 2/20/2022  6:30 PM    * Epigastric pain  Assessment & Plan  62year old female presenting with epigastric pain likely secondary to acute gastroenteritis  - Resolved with supportive care and IV hydration   - Comfortable feeling better, requesting discharge  - Hold Ibuprofen for now    Type 2 diabetes mellitus with hyperglycemia, with long-term current use of insulin [facility])  Assessment & Plan  Lab Results   Component Value Date    HGBA1C 13 3 (H) 08/12/2021       Recent Labs     02/20/22  1849   POCGLU 327*       Blood Sugar Average: Last 72 hrs:  (P) 327   Continue Home regimen:  Trulicity, Lantus 60 units q h s , NovoLog 20 units TID with meals    Benign essential hypertension  Assessment & Plan  Controlled  - Continue home medications on discharge  Esophageal reflux  Assessment & Plan  PPI x 14 days for now  Defer to PCP if further PPI would be needed  I instructed the patient to hold off NSAIDs for now, until she is reevaluated by her primary care provider  Discharging Physician / Practitioner: Mahsa Madsen MD  PCP: Rasheeda Koehler [address] [place] [address]  Admission Date:   Admission Orders (From admission, onward)     Ordered        02/20/22 2219  Place in Observation  Once                      Discharge Date: 02/21/22    Medical Problems             Resolved Problems  Date Reviewed: 2/21/2022    None                Consultations During Hospital Stay:  · None    Procedures Performed:   · None    Significant Findings / Test Results:   · Imaging  CT abdomen pelvis:    No acute inflammatory process identified      Incidental Findings:   · None    Test Results Pending at Discharge (will require follow up):    · None     Outpatient Tests Requested:  · PCP follow-up  · BMP    Complications:  None    Reason for Admission: abdominal pain    Hospital Course:     Erum Long is a 62 y o  female patient who originally presented to the hospital on 2/20/2022 due to abdominal pain  62year old female presenting with abdominal pain, diarrhea, nausea, and vomiting  Clinical presentation may have started after she ate [place] food  CT abdomen pelvis did not show any acute inflammatory process  She received supportive care and IV fluids  At the time of my examination, she reports ability to tolerate po and almost complete resolution of her abdominal pain  She would like to be discharged today and agrees to follow-up with her outpatient provider  She was given potassium chloride for her hypokalemia as a result of GI losses  Patient agrees to follow-up with her providers as scheduled and to take her medications as prescribed  All questions were addressed  she understood the need to seek immediate medical attention should she develop any chest pain, shortness of breath, severe pain, fever, chills, or any other concerning symptoms  Please see above list of diagnoses and related plan for additional information  Condition at Discharge: good     Discharge Day Visit / Exam:     Subjective:  Patient seen and examined at bedside  Comfortable  No new complaints overnight  Vitals: Blood Pressure: 153/71 (02/21/22 0700)  Pulse: 82 (02/21/22 0700)  Temperature: 98 °F (36 7 °C) (02/21/22 0700)  Temp Source: Temporal (02/21/22 0700)  Respirations: 20 (02/21/22 0700)  Height: 5' 3" (160 cm) (02/21/22 0036)  Weight - Scale: 103 kg (227 lb 15 3 oz) (02/21/22 0036)  SpO2: 95 % (02/21/22 0700)  Exam:   Physical Exam  Vitals reviewed  Constitutional:       General: She is not in acute distress  Appearance: She is not ill-appearing  HENT:      Head: Normocephalic        Nose: Nose normal       Mouth/Throat:      Mouth: Mucous membranes are moist    Eyes:      General: No scleral icterus  Cardiovascular:      Rate and Rhythm: Normal rate  Pulmonary:      Effort: Pulmonary effort is normal  No respiratory distress  Abdominal:      General: Bowel sounds are normal  There is no distension  Palpations: Abdomen is soft  Tenderness: There is abdominal tenderness (minimal)  Musculoskeletal:      Right lower leg: No edema  Left lower leg: No edema  Skin:     General: Skin is warm  Neurological:      Mental Status: She is alert and oriented to person, place, and time  Psychiatric:         Mood and Affect: Mood normal          Behavior: Behavior normal        Discharge instructions/Information to patient and family:   See after visit summary for information provided to patient and family  Provisions for Follow-Up Care:  See after visit summary for information related to follow-up care and any pertinent home health orders  Disposition:     Home    For Discharges to [facility] SNF:   · Not Applicable to this Patient - Not Applicable to this Patient    Planned Readmission: No     Discharge Statement:  I spent 39 minutes discharging the patient  This time was spent on the day of discharge  I had direct contact with the patient on the day of discharge  Greater than 50% of the total time was spent examining patient, answering all patient questions, arranging and discussing plan of care with patient as well as directly providing post-discharge instructions  Additional time then spent on discharge activities  Discharge Medications:  See after visit summary for reconciled discharge medications provided to patient and family        ** Please Note: This note has been constructed using a voice recognition system **

## 2022-02-21 NOTE — ED ATTENDING ATTESTATION
2/20/2022  ILarisa DO, saw and evaluated the patient  I have discussed the patient with the resident/non-physician practitioner and agree with the resident's/non-physician practitioner's findings, Plan of Care, and MDM as documented in the resident's/non-physician practitioner's note, except where noted  All available labs and Radiology studies were reviewed  I was present for key portions of any procedure(s) performed by the resident/non-physician practitioner and I was immediately available to provide assistance  At this point I agree with the current assessment done in the Emergency Department  I have conducted an independent evaluation of this patient a history and physical is as follows:    ED Course     62 y o  F p/w N/V x 3 days  Had diarrhea the first day which resolved  Decreased PO intake and unable to take home meds  Associated with nonradiating epigastric discomfort which is constant  Denies F/C, CP, SOB, abd surgeries  Plan: Labs, EKG, CT scan, symptomatic tx      Critical Care Time  Procedures

## 2022-02-21 NOTE — H&P
Ping 74 [id number], 62 y o  female MRN: [id number]  Unit/Bed#: ED 16 Encounter: [id number]  Primary Care Provider: ALEX Tejeda   Date and time admitted to hospital: 2/20/2022  6:30 PM    * Epigastric pain  Assessment & Plan  Presents with abdominal pain, nausea, vomiting x3 days  Epigastric pain initially started upon awakening, then developed nausea/vomiting after attempting to eat as well as diarrhea  Initially improved after day, pain and nausea returned more severe tonight  Has had poor p o  Intake for the last 3 days  Does report chills, afebrile here  · Previously on omeprazole, stopped several months ago  Consistently taking Advil for myalgias (chronic, states secondary to deconditioning/sedentary lifestyle) as well as recently Advil p m  Every night for sleep  · Vital signs stable  · On exam, tender to epigastrium  Mild tenderness in the upper quadrants, no tenderness lower quadrants  · Troponin 19 --> 24  ECG with single lead T-wave depression in aVL only  · Electrolytes stable, mild hypokalemia at 3 1  Repleted in the ED  · Lipase and LFTs WNL  · CT abdomen/pelvis - no acute abnormality    Received Bentyl, Reglan, Zofran, fluids in the ED with significant improvement    Suspect a viral gastritis versus NSAID induced gastritis versus gastroparesis given significant diabetic  GERD likely contributing to epigastric pain  Low suspicion for ACS  Plan:  - Only water overnight, full liquid diet starting tomorrow  Advance diet as tolerated  - start IV Protonix    Instructed on avoiding NSAIDs for the next 2 weeks, and if she is to resume NSAIDS after, she needs to restart her PPI daily   - IV fluids overnight  - Reglan for nausea    Type 2 diabetes mellitus with hyperglycemia, with long-term current use of insulin [facility])  Assessment & Plan  Lab Results   Component Value Date    HGBA1C 13 3 (H) 08/12/2021       Recent Labs 02/20/22  1849   POCGLU 327*       Blood Sugar Average: Last 72 hrs:  (P) 327   Home regimen:  Aleksandra, Lantus 60 units q h s , NovoLog 20 units TID with meals  Has not been taking her insulin due to nausea, vomiting, poor p o  Intake  Glucose currently 300  Will give Lantus 25 units tonight and place on sliding scale insulin  If patient tolerates diet, restart home regimen    Benign essential hypertension  Assessment & Plan  On amlodipine, lisinopril, metoprolol    Esophageal reflux  Assessment & Plan  Previously on Protonix  Restart PPI given chronic NSAID use    VTE Prophylaxis: Enoxaparin (Lovenox)  / sequential compression device   Code Status:  Level 1 full code  POLST: There is no POLST form on file for this patient (pre-hospital)  Discussion with family:  Patient    Anticipated Length of Stay:  Patient will be admitted on an Observation basis with an anticipated length of stay of  less than 2 midnights  Justification for Hospital Stay:  Abdominal pain, nausea/vomiting    Total Time for Visit, including Counseling / Coordination of Care: 45 minutes  Greater than 50% of this total time spent on direct patient counseling and coordination of care  Chief Complaint:   Abdominal pain    History of Present Illness:    Venu Rossi is a 62 y o  female with PMH GERD, T2DM, HTN, depression who presents with epigastric pain, nausea, vomiting x3 days  Patient woke up 3 days ago with moderate to severe epigastric pain  She ate breakfast the morning and developed diarrhea, nausea, vomiting  After day, her symptoms improved on their own  However, today, her epigastric pain, nausea, vomiting returned and is more severe than before, prompting her to come into the ED  Also endorses chills  She reports history of GERD and being on PPI before, but no longer on it    Also reports chronic NSAID use with Advil for chronic myalgias secondary to deconditioning/sedentary lifestyle as well as recent every night use of Advil PM   She received Bentyl, Zofran, Reglan, IVF in the ED was significant improvement her symptoms  Her lab work including lipase, LFTs, electrolytes, troponin were negative  CT abdomen/pelvis was also unremarkable  She will be admitted under observation overnight for IVF and resolution of symptoms  Review of Systems:    Review of Systems   Constitutional: Positive for appetite change and chills  Negative for fatigue and fever  HENT: Negative for ear pain, sore throat and trouble swallowing  Eyes: Negative for visual disturbance  Respiratory: Negative for cough, chest tightness, shortness of breath and wheezing  Cardiovascular: Negative for chest pain, palpitations and leg swelling  Gastrointestinal: Positive for abdominal pain, diarrhea, nausea and vomiting  Negative for abdominal distention  Endocrine: Negative  Genitourinary: Negative for dysuria  Musculoskeletal: Negative for gait problem and myalgias  Skin: Negative for pallor  Allergic/Immunologic: Negative for immunocompromised state  Neurological: Negative for dizziness, syncope, light-headedness, numbness and headaches  Past Medical and Surgical History:     Past Medical History:   Diagnosis Date    Anxiety     Depression     Diabetes [facility] )     Diabetes mellitus [facility]     Esophageal reflux     28 APR 2015 RESOLVED    Hyperlipidemia     Hypertension     Low back pain     sciatica    Pneumonia 2019    Stroke ([facility] ) 12/2015    small vessel, L frontal corona radiata  Rx asa 81, Lipitor 40, risk modification    Vitamin D deficiency        Past Surgical History:   Procedure Laterality Date    COLONOSCOPY         Meds/Allergies:    Prior to Admission medications    Medication Sig Start Date End Date Taking?  Authorizing Provider   ALPRAZolam Tricia Coelho) 0 25 mg tablet TAKE 1 TABLET BY MOUTH DAILY AT BEDTIME AS NEEDED FOR ANXIETY 9/28/21   ALEX Baca   amLODIPine (NORVASC) 10 mg tablet TAKE 1 TABLET BY MOUTH EVERY DAY 2/7/22   ALEX Sharif   aspirin 81 mg chewable tablet Chew 81 mg 12/15/15   Historical Provider, MD   atorvastatin (LIPITOR) 40 mg tablet TAKE 1 TABLET BY MOUTH EVERY DAY 2/8/22   ALEX Baca   Cholecalciferol (Vitamin D-3) 25 MCG (1000 UT) CAPS Take 2,000 Units by mouth daily    Historical Provider, MD   Continuous Blood Gluc  (FreeStyle PRANAY [facility] 14 Day [place]) LITO Use 1 Units continuous 8/25/21   Truman Phan MD   Continuous Blood Gluc Sensor (FreeStyle Noé 14 Day Sensor) MISC Use 1 Units every 14 (fourteen) days 8/25/21   Truman Phan MD   Doxylamine Succinate, Sleep, (UNISOM PO) Take by mouth    Historical Provider, MD   Dulaglutide (Trulicity) 8 17 YE/3 4FG SOPN Inject 0 5 mL (0 75 mg total) under the skin once a week 10/18/21   Carolina Acosta MD   gabapentin (NEURONTIN) 100 mg capsule TAKE 2 CAPSULES BY MOUTH 2 TIMES A DAY  2/8/22   ALEX Baca   glucose blood (True Metrix Blood Glucose Test) test strip Use 1 each 4 (four) times a day  Patient not taking: Reported on 10/18/2021 6/14/21   ALEX Baca   ibuprofen (MOTRIN) 200 mg tablet Take by mouth every 6 (six) hours as needed for mild pain    Historical Provider, MD   Insulin Pen Needle (Pen Needles) 32G X 5 MM MISC Use with insulin four times daily 3/19/21   ALEX Baca   Lancets 28G MISC Use 1 each 4 (four) times a day  Patient not taking: Reported on 12/21/2021 6/14/21   ALEX Baca   Lantus SoloStar 100 units/mL injection pen INJECT 20 UNITS UNDER THE SKIN DAILY AT BEDTIME   INC BY 1 UNIT DAILY UNTIL FASTING GLUCOSE OVER 150  Patient taking differently: 60 Units daily at bedtime   11/18/21   ALEX Baca   lisinopril (ZESTRIL) 40 mg tablet TAKE 1 TABLET BY MOUTH EVERY DAY 9/14/21   ALEX Baca   metoprolol succinate (TOPROL-XL) 25 mg 24 hr tablet TAKE 1 TABLET BY MOUTH EVERY DAY 11/13/21   ALEX Joshua   Multiple Vitamin (multivitamin) tablet Take 1 tablet by mouth daily    Historical Provider, MD   NovoLOG FlexPen 100 units/mL injection pen INJECT 20 UNITS UNDER THE SKIN 3 TIMES A DAY WITH MEALS 22   Ceci Thomason MD   sertraline (ZOLOFT) 100 mg tablet TAKE 1 TABLET BY MOUTH EVERY DAY 22   ALEX Rowley     I have reviewed home medications with patient personally  Allergies: No Known Allergies    Social History:     Marital Status: Single   Occupation:  Noncontributory  Patient Pre-hospital Living Situation:  Home  Patient Pre-hospital Level of Mobility:  Independent  Patient Pre-hospital Diet Restrictions:  Diabetic  Substance Use History:   Social History     Substance and Sexual Activity   Alcohol Use No    Comment: OCCASIONAL ALCOHOL USE IN ALL SCRIPTS     Social History     Tobacco Use   Smoking Status Former Smoker    Types: Cigarettes    Quit date:     Years since quittin 1   Smokeless Tobacco Never Used     Social History     Substance and Sexual Activity   Drug Use No       Family History:    non-contributory    Physical Exam:     Vitals:   Blood Pressure: 151/72 (22)  Pulse: 99 (22)  Temperature: 98 9 °F (37 2 °C) (22)  Temp Source: Oral (22)  Respirations: 18 (22)  SpO2: 95 % (22)    Physical Exam  Vitals and nursing note reviewed  Constitutional:       Appearance: Normal appearance  HENT:      Head: Normocephalic and atraumatic  Mouth/Throat:      Mouth: Mucous membranes are moist       Pharynx: Oropharynx is clear  No oropharyngeal exudate  Eyes:      Extraocular Movements: Extraocular movements intact  Cardiovascular:      Rate and Rhythm: Normal rate and regular rhythm  Pulses: Normal pulses  Heart sounds: Normal heart sounds  No murmur heard  No friction rub  No gallop  Pulmonary:      Effort: Pulmonary effort is normal  No respiratory distress  Breath sounds: Normal breath sounds   No stridor  No wheezing or rales  Abdominal:      General: Abdomen is flat  Bowel sounds are normal  There is no distension  Palpations: Abdomen is soft  Tenderness: There is abdominal tenderness (Epigastrium)  Musculoskeletal:      Right lower leg: No edema  Left lower leg: No edema  Skin:     General: Skin is warm and dry  Neurological:      General: No focal deficit present  Mental Status: She is alert and oriented to person, place, and time  Additional Data:     Lab Results: I have personally reviewed pertinent reports  Results from last 7 days   Lab Units 02/20/22  1857   WBC Thousand/uL 13 45*   HEMOGLOBIN g/dL 13 8   HEMATOCRIT % 42 1   PLATELETS Thousands/uL 284   NEUTROS PCT % 88*   LYMPHS PCT % 6*   MONOS PCT % 5   EOS PCT % 0     Results from last 7 days   Lab Units 02/20/22  1857   SODIUM mmol/L 134*   POTASSIUM mmol/L 3 1*   CHLORIDE mmol/L 96*   CO2 mmol/L 26   BUN mg/dL 12   CREATININE mg/dL 1 00   ANION GAP mmol/L 12   CALCIUM mg/dL 8 6   ALBUMIN g/dL 2 5*   TOTAL BILIRUBIN mg/dL 0 53   ALK PHOS U/L 134*   ALT U/L 29   AST U/L 34   GLUCOSE RANDOM mg/dL 310*         Results from last 7 days   Lab Units 02/20/22  1849   POC GLUCOSE mg/dl 327*               Imaging: I have personally reviewed pertinent reports  CT Abdomen pelvis with contrast   Final Result by Jeni Sahu DO (02/20 2100)      No acute inflammatory process identified  Workstation performed: [id number]             EKG, Pathology, and Other Studies Reviewed on Admission:   · EKG:  NSR, single lead T-wave depression in aVL    Allscripts / Epic Records Reviewed: Yes     ** Please Note: This note has been constructed using a voice recognition system   **

## 2022-02-21 NOTE — ASSESSMENT & PLAN NOTE
Lab Results   Component Value Date    HGBA1C 13 3 (H) 08/12/2021       Recent Labs     02/20/22  1849   POCGLU 327*       Blood Sugar Average: Last 72 hrs:  (P) 327   Continue Home regimen:  Trulicity, Lantus 60 units q h s , NovoLog 20 units TID with meals

## 2022-02-21 NOTE — PLAN OF CARE
Problem: DISCHARGE PLANNING  Goal: Discharge to home or other facility with appropriate resources  Description: INTERVENTIONS:  - Identify barriers to discharge w/patient and caregiver  - Arrange for needed discharge resources and transportation as appropriate  - Identify discharge learning needs (meds)  - Refer to Case Management Department for coordinating discharge planning if the patient needs post-hospital services based on physician/advanced practitioner order or complex needs related to functional status, cognitive ability, or social support system  Outcome: Progressing     Problem: Knowledge Deficit  Goal: Patient/family/caregiver demonstrates understanding of disease process, treatment plan, medications, and discharge instructions  Description: Complete learning assessment and assess knowledge base    Interventions:  - Provide teaching at level of understanding  - Provide teaching via preferred learning methods  Outcome: Progressing     Problem: INFECTION - ADULT  Goal: Absence or prevention of progression during hospitalization  Description: INTERVENTIONS:  - Assess and monitor for signs and symptoms of infection  - Monitor lab/diagnostic results  - Monitor all insertion sites, i e  indwelling lines, tubes, and drains  - Monitor endotracheal if appropriate and nasal secretions for changes in amount and color  - [place] appropriate cooling/warming therapies per order  - Administer medications as ordered  - Instruct and encourage patient and family to use good hand hygiene technique  - Identify and instruct in appropriate isolation precautions for identified infection/condition  Outcome: Progressing     Problem: GASTROINTESTINAL - ADULT  Goal: Minimal or absence of nausea and/or vomiting  Description: INTERVENTIONS:  - Administer IV fluids if ordered to ensure adequate hydration  - Maintain NPO status until nausea and vomiting are resolved  - Nasogastric tube if ordered  - Administer ordered antiemetic medications as needed  - Provide nonpharmacologic comfort measures as appropriate  - Advance diet as tolerated, if ordered  - Consider nutrition services referral to assist patient with adequate nutrition and appropriate food choices  Outcome: Progressing  Goal: Maintains adequate nutritional intake  Description: INTERVENTIONS:  - Monitor percentage of each meal consumed  - Identify factors contributing to decreased intake, treat as appropriate  - Assist with meals as needed  - Monitor I&O, weight, and lab values if indicated  - Obtain nutrition services referral as needed  Outcome: Progressing

## 2022-02-21 NOTE — DISCHARGE INSTRUCTIONS
Acute Abdominal Pain   WHAT YOU NEED TO KNOW:   Acute abdominal pain usually starts suddenly and gets worse quickly  DISCHARGE INSTRUCTIONS:   Seek care immediately if:   · You vomit blood or cannot stop vomiting  · You have blood in your bowel movement, or it looks like tar  · You have bleeding from your rectum  · Your abdomen is larger than usual, more painful, and hard  · You have severe pain in your abdomen  · You stop passing gas and having bowel movements  · You feel weak, dizzy, or faint  Call your doctor if:   · You have a fever  · You have new or worsening signs or symptoms  · Your symptoms do not get better with treatment  · You have questions or concerns about your condition or care  Medicines:   · Medicines  may be given to decrease pain, treat an infection, and manage your symptoms  · Take your medicine as directed  Contact your healthcare provider if you think your medicine is not helping or if you have side effects  Tell him or her if you are allergic to any medicine  Keep a list of the medicines, vitamins, and herbs you take  Include the amounts, and when and why you take them  Bring the list or the pill bottles to follow-up visits  Carry your medicine list with you in case of an emergency  Manage your symptoms:   · Apply heat  on your abdomen for 20 to 30 minutes every 2 hours for as many days as directed  Heat helps decrease pain and muscle spasms  · Make changes to the food you eat, if needed  Do not eat foods that cause abdominal pain or other symptoms  Eat small meals more often  The following changes may also help:    ? Eat more high-fiber foods if you are constipated  High-fiber foods include fruits, vegetables, whole-grain foods, and legumes  ? Do not eat foods that cause gas if you have bloating  Examples include broccoli, cabbage, and cauliflower  Do not drink soda or carbonated drinks  These may also cause gas      ? Do not eat foods or drinks that contain sorbitol or fructose if you have diarrhea and bloating  Some examples are fruit juices, candy, jelly, and sugar-free gum  ? Do not eat high-fat foods  Examples include fried foods, cheeseburgers, hot dogs, and desserts  ? Limit or do not have caffeine  Caffeine may make symptoms, such as heart burn or nausea, worse  ? Drink more liquids to prevent dehydration from diarrhea or vomiting  Ask your healthcare provider how much liquid to drink each day and which liquids are best for you  · Manage your stress  Stress may cause abdominal pain  Your healthcare provider may recommend relaxation techniques and deep breathing exercises to help decrease your stress  Your provider may recommend you talk to someone about your stress or anxiety, such as a counselor or a trusted friend  Get plenty of sleep and exercise regularly  · Limit or do not drink alcohol  Alcohol can make your abdominal pain worse  Ask your healthcare provider if it is okay for you to drink alcohol  Also ask how much is safe for you to drink  A drink of alcohol is 12 ounces of beer, ½ ounce of liquor, or 5 ounces of wine  · Do not smoke  Nicotine and other chemicals in cigarettes can damage your esophagus and stomach  Ask your healthcare provider for information if you currently smoke and need help to quit  E-cigarettes or smokeless tobacco still contain nicotine  Talk to your healthcare provider before you use these products  Follow up with your doctor as directed:  Write down your questions so you remember to ask them during your visits  © Copyright [employer] 2021 Information is for End User's use only and may not be sold, redistributed or otherwise used for commercial purposes  All illustrations and images included in CareNotes® are the copyrighted property of A D [employer] M [employer] Inc  or [facility] Drake   The above information is an  only   It is not intended as medical advice for individual conditions or treatments  Talk to your doctor, nurse or pharmacist before following any medical regimen to see if it is safe and effective for you

## 2022-02-21 NOTE — UTILIZATION REVIEW
Initial Clinical Review    Admission: Date/Time/Statement:   Admission Orders (From admission, onward)     Ordered        02/20/22 2219  Place in Observation  Once                      Orders Placed This Encounter   Procedures    Place in Observation     Standing Status:   Standing     Number of Occurrences:   1     Order Specific Question:   Level of Care     Answer:   Med Surg [16]     ED Arrival Information     Expected Arrival Acuity    - 2/20/2022 18:30 Urgent         Means of arrival Escorted by Service Admission type    Ambulance [facility] Hospitalist Urgent         Arrival complaint    Vomiting        Chief Complaint   Patient presents with    Nausea     patient arrives via ems coming from home c/o nausea vomiting for the past 3 days, patient states she has not been able to keep much down  patient states sips of water and some toast today  patient recieved 4 zofran pta  patient hx of htn has not taken meds     Initial Presentation:   Ms Layla Montelongo is a 62 yof who presents to the ED from home with c/o epigastric pain, N/V, chills,  x 3 days  Pt stopped taking PPI for GERD, chronic NSAID use  PMH: GERD, IDDM, HTN, depression  In the ED she is treated with Bentyl, Zofran, Reglan, IV fluids, K repletion with improvement of symptoms  Labs and imaging were unremarkable  On exam she has abd tenderness in Epigastrum  She is admitted to OBSERVATION status with Epigastric pain  - IV fluids, water only then full liquid diet on 2/21, advance as abhi, IV protonix, avoid NSAIDs x 2 weeks, if returning to NSAID use will need PPI daily, PRN Reglan  IDDM - SSI cover  Esophageal reflux - restart PPI       Date: 2/21:  Pt is written for d/c      ED Triage Vitals   Temperature Pulse Respirations Blood Pressure SpO2   02/20/22 1839 02/20/22 1839 02/20/22 1839 02/20/22 1839 02/20/22 1839   98 9 °F (37 2 °C) (!) 110 18 (!) 195/98 97 %      Temp Source Heart Rate Source Patient Position - Orthostatic VS BP Location FiO2 (%)   02/20/22 1839 02/20/22 1839 02/20/22 1839 02/20/22 1839 --   Oral Monitor Lying Right arm       Pain Score       02/20/22 2029       3          Wt Readings from Last 1 Encounters:   02/21/22 103 kg (227 lb 15 3 oz)     Additional Vital Signs:   02/21/22 0700 98 °F (36 7 °C) 82 20 153/71 -- 95 % None (Room air) Lying   02/21/22 0036 97 8 °F (36 6 °C) 85 20 164/76 -- 96 % -- Lying   02/20/22 2029 -- 99 18 151/72 103 95 % None (Room air) Sitting     Pertinent Labs/Diagnostic Test Results:     2/20 CTAP - No acute inflammatory process identified    2/20 ECG - ST   2/20 ECG - NSR      Results from last 7 days   Lab Units 02/21/22  0622 02/21/22  0009 02/20/22  1857   WBC Thousand/uL 10 70*  --  13 45*   HEMOGLOBIN g/dL 11 8  --  13 8   HEMATOCRIT % 35 5  --  42 1   PLATELETS Thousands/uL 255 286 284   NEUTROS ABS Thousands/µL 7 20  --  11 75*         Results from last 7 days   Lab Units 02/21/22  0622 02/20/22  1857   SODIUM mmol/L 138 134*   POTASSIUM mmol/L 3 2* 3 1*   CHLORIDE mmol/L 101 96*   CO2 mmol/L 28 26   ANION GAP mmol/L 9 12   BUN mg/dL 16 12   CREATININE mg/dL 1 03 1 00   EGFR ml/min/1 73sq m 59 61   CALCIUM mg/dL 8 2* 8 6     Results from last 7 days   Lab Units 02/20/22  1857   AST U/L 34   ALT U/L 29   ALK PHOS U/L 134*   TOTAL PROTEIN g/dL 7 0   ALBUMIN g/dL 2 5*   TOTAL BILIRUBIN mg/dL 0 53     Results from last 7 days   Lab Units 02/21/22  0724 02/21/22  0013 02/20/22  1849   POC GLUCOSE mg/dl 162* 360* 327*     Results from last 7 days   Lab Units 02/21/22  0622 02/20/22  1857   GLUCOSE RANDOM mg/dL 167* 310*     Results from last 7 days   Lab Units 02/21/22  0010 02/20/22  2115 02/20/22 1857   HS TNI 0HR ng/L  --   --  19   HS TNI 2HR ng/L  --  24  --    HSTNI D2 ng/L  --  5  --    HS TNI 4HR ng/L 20  --   --    HSTNI D4 ng/L 1  --   --      Results from last 7 days   Lab Units 02/20/22 1857   LIPASE u/L 68*     ED Treatment:   Medication Administration from 02/20/2022 1830 to 02/21/2022 2113    Date/Time Order Dose Route Action   02/20/2022 1901 ondansetron (ZOFRAN) injection 4 mg 4 mg Intravenous Given   02/20/2022 1900 dicyclomine (BENTYL) tablet 20 mg 20 mg Oral Given   02/20/2022 1904 multi-electrolyte (ISOLYTE-S PH 7 4) bolus 1,000 mL 1,000 mL Intravenous New Bag   02/20/2022 1940 aluminum-magnesium hydroxide-simethicone (MYLANTA) oral suspension 30 mL 30 mL Oral Given   02/20/2022 1938 metoclopramide (REGLAN) injection 10 mg 10 mg Intravenous Given   02/20/2022 2016 iohexol (OMNIPAQUE) 350 MG/ML injection (SINGLE-DOSE) 100 mL 100 mL Intravenous Given   02/20/2022 2115 potassium chloride (K-DUR,KLOR-CON) CR tablet 40 mEq 40 mEq Oral Given   02/21/2022 0001 atorvastatin (LIPITOR) tablet 40 mg 40 mg Oral Given   02/21/2022 0014 insulin glargine (LANTUS) subcutaneous injection 25 Units 0 25 mL 25 Units Subcutaneous Given   02/21/2022 0018 sodium chloride 0 9 % infusion 100 mL/hr Intravenous New Bag   02/21/2022 0015 insulin lispro (HumaLOG) 100 units/mL subcutaneous injection 1-6 Units 6 Units Subcutaneous Given        Past Medical History:   Diagnosis Date    Anxiety     Depression     Diabetes ([facility] )     Diabetes mellitus ([facility] )     Esophageal reflux     28 APR 2015 RESOLVED    Hyperlipidemia     Hypertension     Low back pain     sciatica    Pneumonia 2019    Stroke ([facility] ) 12/2015    small vessel, L frontal corona radiata   Rx asa 81, Lipitor 40, risk modification    Vitamin D deficiency      Present on Admission:   Benign essential hypertension   Esophageal reflux      Admitting Diagnosis: Vomiting [R11 10]  Epigastric pain [R10 13]  Nausea & vomiting [R11 2]  Elevated troponin [R77 8]  Age/Sex: 62 y o  female  Admission Orders:  Scheduled Medications:  amLODIPine, 10 mg, Oral, Daily  aspirin, 81 mg, Oral, Daily  atorvastatin, 40 mg, Oral, Daily With Dinner  enoxaparin, 40 mg, Subcutaneous, Daily  gabapentin, 200 mg, Oral, BID  insulin lispro, 1-6 Units, Subcutaneous, 4x Daily (AC & HS)  lisinopril, 40 mg, Oral, Daily  metoprolol succinate, 25 mg, Oral, Daily  pantoprazole, 40 mg, Intravenous, Q24H MICHELLE  sertraline, 100 mg, Oral, Daily      Continuous IV Infusions:  sodium chloride, 100 mL/hr, Intravenous, Continuous      PRN Meds:  acetaminophen, 650 mg, Oral, Q6H PRN  ALPRAZolam, 0 25 mg, Oral, HS PRN  aluminum-magnesium hydroxide-simethicone, 30 mL, Oral, Q6H PRN  doxylamine, 12 5 mg, Oral, HS PRN  metoclopramide, 10 mg, Intravenous, Q6H PRN    Tele  OOB as abhi   POC GLUCOSE AC/HS WITH SSI COVERAGE       Network Utilization Review Department  ATTENTION: Please call with any questions or concerns to [phone] and carefully listen to the prompts so that you are directed to the right person  All voicemails are confidential   Candia Siemens all requests for admission clinical reviews, approved or denied determinations and any other requests to dedicated fax number below belonging to the campus where the patient is receiving treatment   List of dedicated fax numbers for the Facilities:  [address] [place] [address] DENIALS (Administrative/Medical Necessity) [phone]   [address] (Maternity/NICU/Pediatrics) [phone]   [address]  [address] [address] [place] [address] [address] [facility] [place] [address] [phone]

## 2022-02-21 NOTE — ASSESSMENT & PLAN NOTE
PPI x 14 days for now  Defer to PCP if further PPI would be needed  I instructed the patient to hold off NSAIDs for now, until she is reevaluated by her primary care provider

## 2022-02-21 NOTE — ASSESSMENT & PLAN NOTE
62year old female presenting with epigastric pain likely secondary to acute gastroenteritis  - Resolved with supportive care and IV hydration   - Comfortable feeling better, requesting discharge    - Hold Ibuprofen for now

## 2022-02-21 NOTE — ASSESSMENT & PLAN NOTE
Lab Results   Component Value Date    HGBA1C 13 3 (H) 08/12/2021       Recent Labs     02/20/22  1849   POCGLU 327*       Blood Sugar Average: Last 72 hrs:  (P) 327   Home regimen:  lAeksandra, Lantus 60 units q h s , NovoLog 20 units TID with meals  Has not been taking her insulin due to nausea, vomiting, poor p o   Intake  Glucose currently 300  Will give Lantus 25 units tonight and place on sliding scale insulin  If patient tolerates diet, restart home regimen

## 2022-02-21 NOTE — ASSESSMENT & PLAN NOTE
Presents with abdominal pain, nausea, vomiting x3 days  Epigastric pain initially started upon awakening, then developed nausea/vomiting after attempting to eat as well as diarrhea  Initially improved after day, pain and nausea returned more severe tonight  Has had poor p o  Intake for the last 3 days  Does report chills, afebrile here  · Previously on omeprazole, stopped several months ago  Consistently taking Advil for myalgias (chronic, states secondary to deconditioning/sedentary lifestyle) as well as recently Advil p m  Every night for sleep  · Vital signs stable  · On exam, tender to epigastrium  Mild tenderness in the upper quadrants, no tenderness lower quadrants  · Troponin 19 --> 24  ECG with single lead T-wave depression in aVL only  · Electrolytes stable, mild hypokalemia at 3 1  Repleted in the ED  · Lipase and LFTs WNL  · CT abdomen/pelvis - no acute abnormality    Received Bentyl, Reglan, Zofran, fluids in the ED with significant improvement    Suspect a viral gastritis versus NSAID induced gastritis versus gastroparesis given significant diabetic  GERD likely contributing to epigastric pain  Low suspicion for ACS  Plan:  - Only water overnight, full liquid diet starting tomorrow  Advance diet as tolerated  - start IV Protonix    Instructed on avoiding NSAIDs for the next 2 weeks, and if she is to resume, she needs to restart her PPI daily   - IV fluids overnight

## 2022-02-21 NOTE — PLAN OF CARE
Problem: DISCHARGE PLANNING  Goal: Discharge to home or other facility with appropriate resources  Description: INTERVENTIONS:  - Identify barriers to discharge w/patient and caregiver  - Arrange for needed discharge resources and transportation as appropriate  - Identify discharge learning needs (meds)  - Refer to Case Management Department for coordinating discharge planning if the patient needs post-hospital services based on physician/advanced practitioner order or complex needs related to functional status, cognitive ability, or social support system  Outcome: Progressing     Problem: Knowledge Deficit  Goal: Patient/family/caregiver demonstrates understanding of disease process, treatment plan, medications, and discharge instructions  Description: Complete learning assessment and assess knowledge base    Interventions:  - Provide teaching at level of understanding  - Provide teaching via preferred learning methods  Outcome: Progressing     Problem: INFECTION - ADULT  Goal: Absence or prevention of progression during hospitalization  Description: INTERVENTIONS:  - Assess and monitor for signs and symptoms of infection  - Monitor lab/diagnostic results  - Monitor all insertion sites, i e  indwelling lines, tubes, and drains  - Monitor endotracheal if appropriate and nasal secretions for changes in amount and color  - Eldred appropriate cooling/warming therapies per order  - Administer medications as ordered  - Instruct and encourage patient and family to use good hand hygiene technique  - Identify and instruct in appropriate isolation precautions for identified infection/condition  Outcome: Progressing     Problem: GASTROINTESTINAL - ADULT  Goal: Minimal or absence of nausea and/or vomiting  Description: INTERVENTIONS:  - Administer IV fluids if ordered to ensure adequate hydration  - Maintain NPO status until nausea and vomiting are resolved  - Nasogastric tube if ordered  - Administer ordered antiemetic medications as needed  - Provide nonpharmacologic comfort measures as appropriate  - Advance diet as tolerated, if ordered  - Consider nutrition services referral to assist patient with adequate nutrition and appropriate food choices  Outcome: Progressing  Goal: Maintains adequate nutritional intake  Description: INTERVENTIONS:  - Monitor percentage of each meal consumed  - Identify factors contributing to decreased intake, treat as appropriate  - Assist with meals as needed  - Monitor I&O, weight, and lab values if indicated  - Obtain nutrition services referral as needed  Outcome: Progressing

## 2022-02-22 NOTE — ED PROVIDER NOTES
Final Diagnosis:  1  Nausea & vomiting    2  Epigastric pain    3  Elevated troponin    4  Type 2 diabetes mellitus with hyperglycemia, with long-term current use of insulin ([facility])    5  Uncontrolled type 2 diabetes with neuropathy ([facility] )    6  Diabetic peripheral neuropathy ([facility] [facility] )    7  Hypokalemia         Chief Complaint   Patient presents with    Nausea     patient arrives via ems coming from home c/o nausea vomiting for the past 3 days, patient states she has not been able to keep much down  patient states sips of water and some toast today  patient recieved 4 zofran pta  patient hx of htn has not taken meds     Procedure Note: EKG  Date/Time: 2/20/22 1930  Interpreted by: Shree Guadalupe  Indications / Diagnosis: N/V  ECG reviewed by me, the ED Provider: yes   The EKG demonstrates:  Rhythm: ST  Intervals: normal intervals  Axis: normal axis  QRS/Blocks: normal QRS  ST Changes: T wave inversions in aVL; No acute ST Changes, no STD/LELO  ASSESSMENT + PLAN:   - Nursing note reviewed  1  N/V +epigastric pain  -patient uncomfortable, tenderness on examination, will perform basic labs, EKG, CT abdomen pelvis  -IV antiemetics, IV pain control, IV fluids  -patient with normal workup other than delta troponin that is elevated, will observe overnight for atypical cardiac presentation    Final Dispo   Patient was reassessed  Patient was updated with information regarding the tests/imaging done today  I answered all of the patient's and/or family's questions  Patient and/or family vocalizes understanding  After discussion and given the test results, the patient will be admitted to the hospital  Patient and/or family are agreeable to the plan   They will be admitted to [facility] who will further manage their care in the hospital       Medications   sodium chloride 0 9 % infusion (0 mL/hr Intravenous Stopped 2/21/22 1243)   ondansetron (ZOFRAN) injection 4 mg (0 mg Intravenous Given to EMS 2/20/22 1253)   ondansetron (Parminder Diego) injection 4 mg (4 mg Intravenous Given 2/20/22 1901)   dicyclomine (BENTYL) tablet 20 mg (20 mg Oral Given 2/20/22 1900)   multi-electrolyte (ISOLYTE-S PH 7 4) bolus 1,000 mL (0 mL Intravenous Stopped 2/21/22 0012)   aluminum-magnesium hydroxide-simethicone (MYLANTA) oral suspension 30 mL (30 mL Oral Given 2/20/22 1940)   metoclopramide (REGLAN) injection 10 mg (10 mg Intravenous Given 2/20/22 1938)   iohexol (OMNIPAQUE) 350 MG/ML injection (SINGLE-DOSE) 100 mL (100 mL Intravenous Given 2/20/22 2016)   potassium chloride (K-DUR,KLOR-CON) CR tablet 40 mEq (40 mEq Oral Given 2/20/22 2115)   insulin glargine (LANTUS) subcutaneous injection 25 Units 0 25 mL (25 Units Subcutaneous Given 2/21/22 0014)     Time reflects when diagnosis was documented in both MDM as applicable and the Disposition within this note     Time User Action Codes Description Comment    2/20/2022 10:18 PM Silver Nunnery Add [R11 2] Nausea & vomiting     2/20/2022 10:18 PM Silver Nunnery Add [R10 13] Epigastric pain     2/20/2022 10:18 PM Silver Nunnery Add [R77 8] Elevated troponin     2/21/2022  8:43 AM Ajay Doe Add [E11 65,  Z79 4] Type 2 diabetes mellitus with hyperglycemia, with long-term current use of insulin ([address] )     2/21/2022  8:43 AM Ajay Doe Modify [E11 65,  Z79 4] Type 2 diabetes mellitus with hyperglycemia, with long-term current use of insulin ([address] )     2/21/2022  8:43 AM Ajay Doe Modify [E11 65,  Z79 4] Type 2 diabetes mellitus with hyperglycemia, with long-term current use of insulin ([address] )     2/21/2022  8:43 AM Ajay Doe Add [E11 40,  E11 65] Uncontrolled type 2 diabetes with neuropathy ([address] )     2/21/2022  8:44 AM Cheyenne Doe Modify [E11 65,  Z79 4] Type 2 diabetes mellitus with hyperglycemia, with long-term current use of insulin ([facility] )     2/21/2022  8:44 AM Ajay Doe Modify [E11 40,  E11 65] Uncontrolled type 2 diabetes with neuropathy ([facility] )     2/21/2022  8:44 AM Ajay Doe Modify [E11 65,  Z79 4] Type 2 diabetes mellitus with hyperglycemia, with long-term current use of insulin ([address] )     2/21/2022  8:44 AM Carmelo Doe Modify [E11 40,  E11 65] Uncontrolled type 2 diabetes with neuropathy ([address] )     2/21/2022  8:44 AM Ajay Doe Add [E11 42] Diabetic peripheral neuropathy ([address] )     2/21/2022  8:44 AM Carmelo Doe Modify [E11 65,  Z79 4] Type 2 diabetes mellitus with hyperglycemia, with long-term current use of insulin ([facility] [address] )     2/21/2022  8:44 AM Ajay Doe Modify [E11 40,  E11 65] Uncontrolled type 2 diabetes with neuropathy ([address] )     2/21/2022  8:44 AM Ajay Doe Modify [E11 42] Diabetic peripheral neuropathy ([address] )     2/21/2022 12:41 PM Moi Cisneros Add [E87 6] Hypokalemia       ED Disposition     ED Disposition Condition Date/Time Comment    Admit Stable Sun Feb 20, 2022 2218 Case was discussed with REAL and the patient's admission status was agreed to be Admission Status: observation status to the service of Dr Chaparrita Palacio           Follow-up Information     Follow up With Specialties Details Why Contact Info    [facility] Schedule an appointment as soon as possible for a visit in 1 week(s)  [id number] [facility]  [phone]          Discharge Medication List as of 2/21/2022 11:07 AM      START taking these medications    Details   pantoprazole (PROTONIX) 40 mg tablet Take 1 tablet (40 mg total) by mouth daily for 14 days, Starting Mon 2/21/2022, Until Mon 3/7/2022, Normal         CONTINUE these medications which have CHANGED    Details   Dulaglutide (Trulicity) 4 52 EP/8 2HV SOPN Inject 0 5 mL (0 75 mg total) under the skin once a week Tuesday, Starting Mon 2/21/2022, No Print      insulin glargine (Lantus SoloStar) 100 units/mL injection pen Inject 60 Units under the skin daily at bedtime, Starting Mon 2/21/2022, No Print         CONTINUE these medications which have NOT CHANGED    Details   ALPRAZolam (XANAX) 0 25 mg tablet TAKE 1 TABLET BY MOUTH DAILY AT BEDTIME AS NEEDED FOR ANXIETY, Normal      amLODIPine (NORVASC) 10 mg tablet TAKE 1 TABLET BY MOUTH EVERY DAY, Normal      aspirin 81 mg chewable tablet Chew 81 mg, Starting Tue 12/15/2015, Historical Med      atorvastatin (LIPITOR) 40 mg tablet TAKE 1 TABLET BY MOUTH EVERY DAY, Normal      Cholecalciferol (Vitamin D-3) 25 MCG (1000 UT) CAPS Take 2,000 Units by mouth daily, Historical Med      Doxylamine Succinate, Sleep, (UNISOM PO) Take by mouth, Historical Med      gabapentin (NEURONTIN) 100 mg capsule TAKE 2 CAPSULES BY MOUTH 2 TIMES A DAY , Normal      lisinopril (ZESTRIL) 40 mg tablet TAKE 1 TABLET BY MOUTH EVERY DAY, Normal      metoprolol succinate (TOPROL-XL) 25 mg 24 hr tablet TAKE 1 TABLET BY MOUTH EVERY DAY, Normal      Multiple Vitamin (multivitamin) tablet Take 1 tablet by mouth daily, Historical Med      NovoLOG FlexPen 100 units/mL injection pen INJECT 20 UNITS UNDER THE SKIN 3 TIMES A DAY WITH MEALS, Normal      sertraline (ZOLOFT) 100 mg tablet TAKE 1 TABLET BY MOUTH EVERY DAY, Normal      Continuous Blood Gluc  (FreeStyle [address] Day Rogers) LITO Use 1 Units continuous, Starting Wed 8/25/2021, Normal      Continuous Blood Gluc Sensor (FreeStyle [address] Day Sensor) MISC Use 1 Units every 14 (fourteen) days, Starting Wed 8/25/2021, Normal      glucose blood (True Metrix Blood Glucose Test) test strip Use 1 each 4 (four) times a day, Starting Mon 6/14/2021, Normal      Insulin Pen Needle (Pen Needles) 32G X 5 MM MISC Use with insulin four times daily, Normal      Lancets 28G MISC Use 1 each 4 (four) times a day, Starting Mon 6/14/2021, Normal         STOP taking these medications       ibuprofen (MOTRIN) 200 mg tablet Comments:   Reason for Stopping:             Outpatient Discharge Orders   Activity as tolerated     Call provider for:  persistent nausea or vomiting     Call provider for:  severe uncontrolled pain     Call provider for:  persistent dizziness or light-headedness Call provider for:  extreme fatigue     Prior to Admission Medications   Prescriptions Last Dose Informant Patient Reported? Taking? ALPRAZolam (XANAX) 0 25 mg tablet Past Week at Unknown time  No Yes   Sig: TAKE 1 TABLET BY MOUTH DAILY AT BEDTIME AS NEEDED FOR ANXIETY   Cholecalciferol (Vitamin D-3) 25 MCG (1000 UT) CAPS Past Week at Unknown time  Yes Yes   Sig: Take 2,000 Units by mouth daily   Continuous Blood Gluc  (FreeStyle [address] Day [place]) LITO   No No   Sig: Use 1 Units continuous   Continuous Blood Gluc Sensor (FreeStyle [address] Day Sensor) MISC   No No   Sig: Use 1 Units every 14 (fourteen) days   Doxylamine Succinate, Sleep, (UNISOM PO) Past Week at Unknown time  Yes Yes   Sig: Take by mouth   Dulaglutide (Trulicity) 5 02 BK/0 6EL SOPN Past Week at Unknown time  No Yes   Sig: Inject 0 5 mL (0 75 mg total) under the skin once a week   Patient taking differently: Inject 0 75 mg under the skin once a week Tuesday    Insulin Pen Needle (Pen Needles) 32G X 5 MM MISC   No No   Sig: Use with insulin four times daily   Lancets 28G MISC   No No   Sig: Use 1 each 4 (four) times a day   Patient not taking: Reported on 12/21/2021    Lantus SoloStar 100 units/mL injection pen 2/21/2022 at Unknown time Self No Yes   Sig: INJECT 20 UNITS UNDER THE SKIN DAILY AT BEDTIME   INC BY 1 UNIT DAILY UNTIL FASTING GLUCOSE OVER 150   Patient taking differently: 60 Units daily at bedtime     Multiple Vitamin (multivitamin) tablet Past Week at Unknown time  Yes Yes   Sig: Take 1 tablet by mouth daily   NovoLOG FlexPen 100 units/mL injection pen Past Week at Unknown time  No Yes   Sig: INJECT 20 UNITS UNDER THE SKIN 3 TIMES A DAY WITH MEALS   amLODIPine (NORVASC) 10 mg tablet Past Week at Unknown time  No Yes   Sig: TAKE 1 TABLET BY MOUTH EVERY DAY   aspirin 81 mg chewable tablet Past Week at Unknown time Self Yes Yes   Sig: Chew 81 mg   atorvastatin (LIPITOR) 40 mg tablet 2/21/2022 at Unknown time  No Yes   Sig: TAKE 1 TABLET BY MOUTH EVERY DAY   gabapentin (NEURONTIN) 100 mg capsule Past Week at Unknown time  No Yes   Sig: TAKE 2 CAPSULES BY MOUTH 2 TIMES A DAY  Patient taking differently: 200 mg     glucose blood (True Metrix Blood Glucose Test) test strip   No No   Sig: Use 1 each 4 (four) times a day   Patient not taking: Reported on 10/18/2021   ibuprofen (MOTRIN) 200 mg tablet Past Week at Unknown time  Yes Yes   Sig: Take by mouth every 6 (six) hours as needed for mild pain   lisinopril (ZESTRIL) 40 mg tablet Past Week at Unknown time  No Yes   Sig: TAKE 1 TABLET BY MOUTH EVERY DAY   metoprolol succinate (TOPROL-XL) 25 mg 24 hr tablet Past Week at Unknown time  No Yes   Sig: TAKE 1 TABLET BY MOUTH EVERY DAY   sertraline (ZOLOFT) 100 mg tablet Past Week at Unknown time  No Yes   Sig: TAKE 1 TABLET BY MOUTH EVERY DAY      Facility-Administered Medications: None       History of Present Illness: This is a 62 y o  female presenting today for evaluation of nausea vomiting  Patient has a history of diabetes and has had nausea vomiting today  She has thrown up for the past 3 days  She says that she has not had any coffee-ground emesis or hematemesis  Had diarrhea 3 days ago, but no bowel movement since then  No history of any abdominal surgeries  No chest pain or shortness of breath  No diaphoresis or palpitations  Patient's pain does not radiate anywhere  - No language barrier    - History obtained from patient and chart   - There are no limitations to the history obtained  - Previous charting was reviewed  Some data reviewed included below for ease of access whether or not it is relevant to this patient encounter  Past Medical, Past Surgical History:    has a past medical history of Anxiety, Depression, Diabetes ([address] ), Diabetes mellitus ([address] ), Esophageal reflux, Hyperlipidemia, Hypertension, Low back pain, Pneumonia (2019), Stroke ([facility] ) (12/2015), and Vitamin D deficiency     has a past surgical history that includes Colonoscopy  Allergies:   No Known Allergies    Social and Family History:     Social History     Substance and Sexual Activity   Alcohol Use No    Comment: OCCASIONAL ALCOHOL USE IN ALL SCRIPTS     Social History     Tobacco Use   Smoking Status Former Smoker    Types: Cigarettes    Quit date:     Years since quittin 1   Smokeless Tobacco Never Used     Social History     Substance and Sexual Activity   Drug Use No       Review of Systems:   Review of Systems   Constitutional: Negative for chills, diaphoresis and fever  HENT: Negative  Eyes: Negative  Negative for visual disturbance  Respiratory: Negative  Negative for shortness of breath  Cardiovascular: Negative  Negative for chest pain  Gastrointestinal: Positive for abdominal pain, nausea and vomiting  Endocrine: Negative  Genitourinary: Negative  Musculoskeletal: Negative  Negative for myalgias  Skin: Negative  Negative for rash  Allergic/Immunologic: Negative  Neurological: Negative  Negative for weakness, light-headedness, numbness and headaches  Hematological: Negative  Psychiatric/Behavioral: Negative  All other systems reviewed and are negative  Physical Examination     Vitals:    22 1839 22 2029 22 0036 22 0700   BP: (!) 195/98 151/72 164/76 153/71   BP Location: Right arm Right arm Right arm Right arm   Pulse: (!) 110 99 85 82   Resp: 18 18 20 20   Temp: 98 9 °F (37 2 °C)  97 8 °F (36 6 °C) 98 °F (36 7 °C)   TempSrc: Oral  Temporal Temporal   SpO2: 97% 95% 96% 95%   Weight:   103 kg (227 lb 15 3 oz)    Height:   5' 3" (1 6 m)      Vitals reviewed by me  Physical Exam  Vitals and nursing note reviewed  Constitutional:       General: She is not in acute distress  Appearance: She is not ill-appearing or toxic-appearing  HENT:      Head: Atraumatic        Right Ear: External ear normal       Left Ear: External ear normal       Nose: Nose normal       Mouth/Throat:      Pharynx: Oropharynx is clear  Eyes:      General: No scleral icterus  Extraocular Movements: Extraocular movements intact  Pupils: Pupils are equal, round, and reactive to light  Cardiovascular:      Rate and Rhythm: Normal rate  Pulses: Normal pulses  Pulmonary:      Effort: Pulmonary effort is normal  No respiratory distress  Breath sounds: Normal breath sounds  Abdominal:      General: There is no distension  Tenderness: There is abdominal tenderness  There is no guarding or rebound  Musculoskeletal:         General: No deformity  Normal range of motion  Skin:     Findings: No rash  Neurological:      General: No focal deficit present  Mental Status: She is oriented to person, place, and time  Mental status is at baseline  Cranial Nerves: No cranial nerve deficit  Motor: No weakness  Gait: Gait normal    Psychiatric:         Mood and Affect: Mood normal             Risk Stratification Tools     HEART Risk Score      Most Recent Value   Heart Score Risk Calculator    History 1 Filed at: 02/20/2022 2100   ECG 1 Filed at: 02/20/2022 2100   Age 1 Filed at: 02/20/2022 2100   Risk Factors 2 Filed at: 02/20/2022 2100   Troponin 1 Filed at: 02/20/2022 2100   HEART Score 6 Filed at: 02/20/2022 2100                      CT Abdomen pelvis with contrast   Final Result      No acute inflammatory process identified              Workstation performed: [id number]           Orders Placed This Encounter   Procedures    CT Abdomen pelvis with contrast    CBC and differential    CMP    Lipase    HS Troponin 0hr (reflex protocol)    HS Troponin I 2hr    HS Troponin I 4hr    Basic metabolic panel    CBC and differential    Platelet count    Activity as tolerated    Call provider for:  persistent nausea or vomiting    Call provider for:  severe uncontrolled pain    Call provider for:  persistent dizziness or light-headedness    Call provider for:  extreme fatigue    EKG RESULTS    ECG 12 lead    ECG 12 lead    ECG 12 lead    Place in Observation    Discharge patient       Labs:     Labs Reviewed   CBC AND DIFFERENTIAL - Abnormal       Result Value Ref Range Status    WBC 13 45 (*) 4 31 - 10 16 Thousand/uL Final    RBC 5 05  3 81 - 5 12 Million/uL Final    Hemoglobin 13 8  11 5 - 15 4 g/dL Final    Hematocrit 42 1  34 8 - 46 1 % Final    MCV 83  82 - 98 fL Final    MCH 27 3  26 8 - 34 3 pg Final    MCHC 32 8  31 4 - 37 4 g/dL Final    RDW 14 1  11 6 - 15 1 % Final    MPV 8 7 (*) 8 9 - 12 7 fL Final    Platelets 205  513 - 390 Thousands/uL Final    nRBC 0  /100 WBCs Final    Neutrophils Relative 88 (*) 43 - 75 % Final    Immat GRANS % 1  0 - 2 % Final    Lymphocytes Relative 6 (*) 14 - 44 % Final    Monocytes Relative 5  4 - 12 % Final    Eosinophils Relative 0  0 - 6 % Final    Basophils Relative 0  0 - 1 % Final    Neutrophils Absolute 11 75 (*) 1 85 - 7 62 Thousands/µL Final    Immature Grans Absolute 0 17  0 00 - 0 20 Thousand/uL Final    Lymphocytes Absolute 0 81  0 60 - 4 47 Thousands/µL Final    Monocytes Absolute 0 67  0 17 - 1 22 Thousand/µL Final    Eosinophils Absolute 0 00  0 00 - 0 61 Thousand/µL Final    Basophils Absolute 0 05  0 00 - 0 10 Thousands/µL Final   COMPREHENSIVE METABOLIC PANEL - Abnormal    Sodium 134 (*) 136 - 145 mmol/L Final    Potassium 3 1 (*) 3 5 - 5 3 mmol/L Final    Chloride 96 (*) 100 - 108 mmol/L Final    CO2 26  21 - 32 mmol/L Final    ANION GAP 12  4 - 13 mmol/L Final    BUN 12  5 - 25 mg/dL Final    Creatinine 1 00  0 60 - 1 30 mg/dL Final    Comment: Standardized to IDMS reference method    Glucose 310 (*) 65 - 140 mg/dL Final    Comment: If the patient is fasting, the ADA then defines impaired fasting glucose as > 100 mg/dL and diabetes as > or equal to 123 mg/dL  Specimen collection should occur prior to Sulfasalazine administration due to the potential for falsely depressed results  Specimen collection should occur prior to Sulfapyridine administration due to the potential for falsely elevated results  Calcium 8 6  8 3 - 10 1 mg/dL Final    Corrected Calcium 9 8  8 3 - 10 1 mg/dL Final    AST 34  5 - 45 U/L Final    Comment: Specimen collection should occur prior to Sulfasalazine administration due to the potential for falsely depressed results  ALT 29  12 - 78 U/L Final    Comment: Specimen collection should occur prior to Sulfasalazine administration due to the potential for falsely depressed results  Alkaline Phosphatase 134 (*) 46 - 116 U/L Final    Total Protein 7 0  6 4 - 8 2 g/dL Final    Albumin 2 5 (*) 3 5 - 5 0 g/dL Final    Total Bilirubin 0 53  0 20 - 1 00 mg/dL Final    Comment: Use of this assay is not recommended for patients undergoing treatment with eltrombopag due to the potential for falsely elevated results      eGFR 61  ml/min/1 73sq m Final    Narrative:     [place] guidelines for Chronic Kidney Disease (CKD):     Stage 1 with normal or high GFR (GFR > 90 mL/min/1 73 square meters)    Stage 2 Mild CKD (GFR = 60-89 mL/min/1 73 square meters)    Stage 3A Moderate CKD (GFR = 45-59 mL/min/1 73 square meters)    Stage 3B Moderate CKD (GFR = 30-44 mL/min/1 73 square meters)    Stage 4 Severe CKD (GFR = 15-29 mL/min/1 73 square meters)    Stage 5 End Stage CKD (GFR <15 mL/min/1 73 square meters)  Note: GFR calculation is accurate only with a steady state creatinine   LIPASE - Abnormal    Lipase 68 (*) 73 - 393 u/L Final   PLATELET COUNT - Abnormal    Platelets 849  768 - 390 Thousands/uL Final    MPV 8 5 (*) 8 9 - 12 7 fL Final   POCT GLUCOSE - Abnormal    POC Glucose 327 (*) 65 - 140 mg/dl Final   POCT GLUCOSE - Abnormal    POC Glucose 360 (*) 65 - 140 mg/dl Final   HS TROPONIN I 0HR - Normal    hs TnI 0hr 19  "Refer to ACS Flowchart"- see link ng/L Final    Comment:                                              Initial (time 0) result  If >=50 ng/L, Myocardial injury suggested ;  Type of myocardial injury and treatment strategy  to be determined  If 5-49 ng/L, a delta result at 2 hours and or 4 hours will be needed to further evaluate  If <4 ng/L, and chest pain has been >3 hours since onset, patient may qualify for discharge based on the HEART score in the ED  If <5 ng/L and <3hours since onset of chest pain, a delta result at 2 hours will be needed to further evaluate  Second Troponin (time 2 hours)  If calculated delta >= 20 ng/L,  Myocardial injury suggested ; Type of myocardial injury and treatment strategy to be determined  If 5-49 ng/L and the calculated delta is 5-19 ng/L, consult medical service for evaluation  Continue evaluation for ischemia on ecg and other possible etiology and repeat hs troponin at 4 hours  If delta is <5 ng/L at 2 hours, consider discharge based on risk stratification via the HEART score (if in ED), or WILFREDO risk score in IP/Observation  HS TROPONIN I 2HR - Normal    hs TnI 2hr 24  "Refer to ACS Flowchart"- see link ng/L Final    Comment:                                              Initial (time 0) result  If >=50 ng/L, Myocardial injury suggested ;  Type of myocardial injury and treatment strategy  to be determined  If 5-49 ng/L, a delta result at 2 hours and or 4 hours will be needed to further evaluate  If <4 ng/L, and chest pain has been >3 hours since onset, patient may qualify for discharge based on the HEART score in the ED  If <5 ng/L and <3hours since onset of chest pain, a delta result at 2 hours will be needed to further evaluate  Second Troponin (time 2 hours)  If calculated delta >= 20 ng/L,  Myocardial injury suggested ; Type of myocardial injury and treatment strategy to be determined  If 5-49 ng/L and the calculated delta is 5-19 ng/L, consult medical service for evaluation    Continue evaluation for ischemia on ecg and other possible etiology and repeat hs troponin at 4 hours   If delta is <5 ng/L at 2 hours, consider discharge based on risk stratification via the HEART score (if in ED), or WILFREDO risk score in IP/Observation  Delta 2hr hsTnI 5  <20 ng/L Final       Imaging:   CT Abdomen pelvis with contrast    Result Date: 2/20/2022  Narrative: CT ABDOMEN AND PELVIS WITH IV CONTRAST INDICATION:   Nausea/vomiting nausea/vomitting  COMPARISON:  Multiple priors most recently ultrasound 1/31/2020 TECHNIQUE:  CT examination of the abdomen and pelvis was performed  Axial, sagittal, and coronal 2D reformatted images were created from the source data and submitted for interpretation  Radiation dose length product (DLP) for this visit:  949 mGy-cm   This examination, like all CT scans performed in the [facility], was performed utilizing techniques to minimize radiation dose exposure, including the use of iterative reconstruction and automated exposure control  IV Contrast:  100 mL of iohexol (OMNIPAQUE) Enteric Contrast:  Enteric contrast was not administered  FINDINGS: ABDOMEN LOWER CHEST:  No clinically significant abnormality identified in the visualized lower chest  LIVER/BILIARY TREE:  Unremarkable  GALLBLADDER:  No calcified gallstones  No pericholecystic inflammatory change  SPLEEN:  Unremarkable  PANCREAS:  Unremarkable  ADRENAL GLANDS:  Small left adrenal calcification likely sequela of remote infection or inflammation  KIDNEYS/URETERS:  One or more simple renal cyst(s) is noted  Otherwise unremarkable kidneys  No hydronephrosis  STOMACH AND BOWEL:  Unremarkable  APPENDIX:  A normal appendix was visualized  ABDOMINOPELVIC CAVITY:  No ascites  No pneumoperitoneum  No lymphadenopathy  VESSELS:  Atherosclerotic changes are present  No evidence of aneurysm  PELVIS REPRODUCTIVE ORGANS:  Fibroid uterus with IUD in place  URINARY BLADDER:  Unremarkable  ABDOMINAL WALL/INGUINAL REGIONS:  Unremarkable   OSSEOUS STRUCTURES:  No acute fracture or destructive osseous lesion  Impression: No acute inflammatory process identified  Workstation performed: [id number]        Final Diagnosis:  1  Nausea & vomiting    2  Epigastric pain    3  Elevated troponin    4  Type 2 diabetes mellitus with hyperglycemia, with long-term current use of insulin (HCC)    5  Uncontrolled type 2 diabetes with neuropathy ([facility] [address] )    6  Diabetic peripheral neuropathy ([facility] )    7  Hypokalemia        Code Status: Prior    Portions of the record may have been created with voice recognition software  Occasional wrong word or "sound a like" substitutions may have occurred due to the inherent limitations of voice recognition software  Read the chart carefully and recognize, using context, where substitutions have occurred      Electronically signed by:  Karine Asencio, PGY 3, MD Geo Anthony MD  02/22/22 1493

## 2022-03-15 ENCOUNTER — RA CDI HCC (OUTPATIENT)
Dept: OTHER | Facility: HOSPITAL | Age: 62
End: 2022-03-15

## 2022-03-15 NOTE — PROGRESS NOTES
[facility]  coding opportunities          Chart Reviewed number of suggestions sent to Provider: 3     1)Refer to 1/31/20 CT OF ABDOMEN   I70 0 atherosclerosis of aorta ([facility] )    2)Refer to eye exam dated 12/6/21   E11 36: Type 2 diabetes mellitus with diabetic cataract ([facility] )    H25 9 Unspecified age-related cataract         ---- Per ICD 10 coding guidelines, cataracts in diabetic patients should be coded as linked conditions    3)Refer to eye exam dated 12/6/21  E11 3199 - Type 2 diabetes mellitus with unspecified diabetic retinopathy without macular edema ([facility] )      With the beginning of the new year, this is a reminder to address ALL HCC (risk adjustment) codes for 2022 as patient scores reset to zero MARIAN  (7 DX)  Patients Insurance        Commercial Insurance: [employer]

## 2022-03-16 ENCOUNTER — OFFICE VISIT (OUTPATIENT)
Dept: FAMILY MEDICINE CLINIC | Facility: CLINIC | Age: 62
End: 2022-03-16
Payer: COMMERCIAL

## 2022-03-16 ENCOUNTER — VBI (OUTPATIENT)
Dept: ADMINISTRATIVE | Facility: OTHER | Age: 62
End: 2022-03-16

## 2022-03-16 VITALS
SYSTOLIC BLOOD PRESSURE: 132 MMHG | TEMPERATURE: 99.2 F | DIASTOLIC BLOOD PRESSURE: 74 MMHG | HEIGHT: 64 IN | OXYGEN SATURATION: 95 % | WEIGHT: 232.6 LBS | HEART RATE: 92 BPM | BODY MASS INDEX: 39.71 KG/M2

## 2022-03-16 DIAGNOSIS — L08.9 LEFT FOOT INFECTION: Primary | ICD-10-CM

## 2022-03-16 DIAGNOSIS — F41.0 PANIC ATTACK: ICD-10-CM

## 2022-03-16 DIAGNOSIS — B35.3 TINEA PEDIS OF BOTH FEET: ICD-10-CM

## 2022-03-16 DIAGNOSIS — IMO0002 UNCONTROLLED TYPE 2 DIABETES WITH NEUROPATHY: ICD-10-CM

## 2022-03-16 DIAGNOSIS — E11.42 DIABETIC PERIPHERAL NEUROPATHY (HCC): ICD-10-CM

## 2022-03-16 LAB — SL AMB POCT HEMOGLOBIN AIC: 9.4 (ref ?–6.5)

## 2022-03-16 PROCEDURE — 83036 HEMOGLOBIN GLYCOSYLATED A1C: CPT | Performed by: NURSE PRACTITIONER

## 2022-03-16 PROCEDURE — 3046F HEMOGLOBIN A1C LEVEL >9.0%: CPT | Performed by: NURSE PRACTITIONER

## 2022-03-16 PROCEDURE — 3078F DIAST BP <80 MM HG: CPT | Performed by: NURSE PRACTITIONER

## 2022-03-16 PROCEDURE — 1111F DSCHRG MED/CURRENT MED MERGE: CPT | Performed by: NURSE PRACTITIONER

## 2022-03-16 PROCEDURE — 3075F SYST BP GE 130 - 139MM HG: CPT | Performed by: NURSE PRACTITIONER

## 2022-03-16 PROCEDURE — 3725F SCREEN DEPRESSION PERFORMED: CPT | Performed by: NURSE PRACTITIONER

## 2022-03-16 PROCEDURE — 99214 OFFICE O/P EST MOD 30 MIN: CPT | Performed by: NURSE PRACTITIONER

## 2022-03-16 RX ORDER — CLOTRIMAZOLE AND BETAMETHASONE DIPROPIONATE 10; .64 MG/G; MG/G
CREAM TOPICAL 2 TIMES DAILY
Qty: 30 G | Refills: 0 | Status: SHIPPED | OUTPATIENT
Start: 2022-03-16

## 2022-03-16 RX ORDER — ALPRAZOLAM 0.25 MG/1
0.25 TABLET ORAL DAILY PRN
Qty: 10 TABLET | Refills: 0 | Status: SHIPPED | OUTPATIENT
Start: 2022-03-16 | End: 2022-04-15 | Stop reason: SDUPTHER

## 2022-03-16 RX ORDER — AMOXICILLIN AND CLAVULANATE POTASSIUM 875; 125 MG/1; MG/1
1 TABLET, FILM COATED ORAL EVERY 12 HOURS SCHEDULED
Qty: 20 TABLET | Refills: 0 | Status: SHIPPED | OUTPATIENT
Start: 2022-03-16 | End: 2022-03-26

## 2022-03-16 NOTE — PROGRESS NOTES
[facility]    ASSESSMENT AND PLAN     1  Left foot infection  Treat today with Augmentin  Dose/use discussed  Recommend probiotic  Advised to soak foot - excessive calluses/fungus  Keep clean and dry  Abx ointment/bandage  Referral today for podiatry evaluation and routine diabetic foot care  Discussed again the risks associated with (although significant improvement noted in HgA1c) poorly controlled diabetes  Advised to ALWAYS wear hard sole shoes - as she does admit to going bare foot frequently  Advised daily skin checks  Podiatry follow up  Due for routine check - schedule for next week  Will recheck foot at that time  F/U sooner if needed  - clotrimazole-betamethasone (LOTRISONE) 1-0 05 % cream; Apply topically 2 (two) times a day  Dispense: 30 g; Refill: 0  - amoxicillin-clavulanate (Augmentin) 875-125 mg per tablet; Take 1 tablet by mouth every 12 (twelve) hours for 10 days  Dispense: 20 tablet; Refill: 0  - Ambulatory Referral to Podiatry; Future    2  Uncontrolled type 2 diabetes with neuropathy (HCC)  HgA1c today: 9 4  Significant improvement from August: 13 3  Is overdue for Endo follow up  Will have staff assist with scheduling - to promote compliance  States compliant with insulin, but [facility] the [facility]  [facility] and [facility] not [facility]  Advised to check other pharmacy as it has apparently been helping based on her current POCT  She also notes that her insurance is going to be changing and they will not be covering after April  She was encouraged to discuss with endo - as there are other options  Advised she inquire with her insurance as to what they will cover so she can let endo know    - POCT hemoglobin A1c  - Ambulatory Referral to Podiatry; Future    3  Diabetic peripheral neuropathy ([facility] )    - Ambulatory Referral to Podiatry; Future    4  Tinea pedis of both feet  Fungal infection   Podiatry referral in    - clotrimazole-betamethasone (LOTRISONE) 1-0 05 % cream; Apply topically 2 (two) times a day  Dispense: 30 g; Refill: 0  - Ambulatory Referral to Podiatry; Future      5  Panic attack  Xanax refilled today - 10 tabs for panic attacks only  PMED verified with no red flags      BMI Counseling: Body mass index is 39 93 kg/m²  The BMI is above normal  Nutrition recommendations include moderation in carbohydrate intake and reducing intake of saturated and trans fat  Exercise recommendations include moderate physical activity 150 minutes/week  Rationale for BMI follow-up plan is due to patient being overweight or obese  SUBJECTIVE       Patient ID: Annalise Simmons is a 62 y o  female  Chief Complaint   Patient presents with    Foreign Body in Skin     Left foot       HISTORY OF PRESENT ILLNESS    Presents  with pain, left lateral foot  Present for few days  Worsening   Admits to recently walking barefoot and belevies she may a splinter        The following portions of the patient's history were reviewed and updated as appropriate: allergies, current medications, past family history, past medical history, past social history, past surgical history and problem list     REVIEW OF SYSTEMS  Review of Systems   Skin:        Foot pain as in HPI       OBJECTIVE      VITAL SIGNS  /74 (BP Location: Right arm, Patient Position: Sitting, Cuff Size: Adult)   Pulse 92   Temp 99 2 °F (37 3 °C)   Ht 5' 4" (1 626 m)   Wt 106 kg (232 lb 9 6 oz)   SpO2 95%   BMI 39 93 kg/m²     CURRENT MEDICATIONS    Current Outpatient Medications:     ALPRAZolam (XANAX) 0 25 mg tablet, TAKE 1 TABLET BY MOUTH DAILY AT BEDTIME AS NEEDED FOR ANXIETY, Disp: 10 tablet, Rfl: 0    amLODIPine (NORVASC) 10 mg tablet, TAKE 1 TABLET BY MOUTH EVERY DAY, Disp: 90 tablet, Rfl: 1    aspirin 81 mg chewable tablet, Chew 81 mg, Disp: , Rfl:     atorvastatin (LIPITOR) 40 mg tablet, TAKE 1 TABLET BY MOUTH EVERY DAY, Disp: 90 tablet, Rfl: 1    Cholecalciferol (Vitamin D-3) 25 MCG (1000 UT) CAPS, Take 2,000 Units by mouth daily, Disp: , Rfl:     Continuous Blood Gluc  (FreeStyle Noé 14 Day [place]) LITO, Use 1 Units continuous, Disp: 1 each, Rfl: 0    Continuous Blood Gluc Sensor (FreeStyle Noé 14 Day Sensor) MISC, Use 1 Units every 14 (fourteen) days, Disp: 2 each, Rfl: 6    gabapentin (NEURONTIN) 100 mg capsule, TAKE 2 CAPSULES BY MOUTH 2 TIMES A DAY   (Patient taking differently: 200 mg  ), Disp: 360 capsule, Rfl: 0    insulin glargine (Lantus SoloStar) 100 units/mL injection pen, Inject 60 Units under the skin daily at bedtime, Disp: , Rfl:     Insulin Pen Needle (Pen Needles) 32G X 5 MM MISC, Use with insulin four times daily, Disp: 100 each, Rfl: 3    lisinopril (ZESTRIL) 40 mg tablet, TAKE 1 TABLET BY MOUTH EVERY DAY, Disp: 90 tablet, Rfl: 1    metoprolol succinate (TOPROL-XL) 25 mg 24 hr tablet, TAKE 1 TABLET BY MOUTH EVERY DAY, Disp: 90 tablet, Rfl: 0    Multiple Vitamin (multivitamin) tablet, Take 1 tablet by mouth daily, Disp: , Rfl:     NovoLOG FlexPen 100 units/mL injection pen, INJECT 20 UNITS UNDER THE SKIN 3 TIMES A DAY WITH MEALS, Disp: 15 mL, Rfl: 1    sertraline (ZOLOFT) 100 mg tablet, TAKE 1 TABLET BY MOUTH EVERY DAY, Disp: 90 tablet, Rfl: 0    amoxicillin-clavulanate (Augmentin) 875-125 mg per tablet, Take 1 tablet by mouth every 12 (twelve) hours for 10 days, Disp: 20 tablet, Rfl: 0    clotrimazole-betamethasone (LOTRISONE) 1-0 05 % cream, Apply topically 2 (two) times a day, Disp: 30 g, Rfl: 0    Doxylamine Succinate, Sleep, (UNISOM PO), Take by mouth (Patient not taking: Reported on 3/16/2022 ), Disp: , Rfl:     Dulaglutide (Trulicity) 1 61 FU/5 4HX SOPN, Inject 0 5 mL (0 75 mg total) under the skin once a week Tuesday (Patient not taking: Reported on 3/16/2022 ), Disp: , Rfl:     glucose blood (True Metrix Blood Glucose Test) test strip, Use 1 each 4 (four) times a day (Patient not taking: Reported on 10/18/2021), Disp: 400 strip, Rfl: 0    Lancets 28G MISC, Use 1 each 4 (four) times a day (Patient not taking: Reported on 12/21/2021 ), Disp: 400 each, Rfl: 0    pantoprazole (PROTONIX) 40 mg tablet, Take 1 tablet (40 mg total) by mouth daily for 14 days, Disp: 14 tablet, Rfl: 0    potassium chloride (K-DUR,KLOR-CON) 10 mEq tablet, Take 2 tablets (20 mEq total) by mouth 2 (two) times a day for 2 doses, Disp: 4 tablet, Rfl: 0      PHYSICAL EXAMINATION   Physical Exam  Vitals and nursing note reviewed  Constitutional:       General: She is not in acute distress  Appearance: Normal appearance  She is well-developed  She is not ill-appearing  Pulmonary:      Effort: Pulmonary effort is normal  No respiratory distress  Feet:      Comments: B/l feet with thick callus and fungal infection  Nail thick  Small crack - lateral heel of left foot  Red and tender  No drainage  Skin:     General: Skin is warm and dry  Neurological:      General: No focal deficit present  Mental Status: She is alert and oriented to person, place, and time     Psychiatric:         Attention and Perception: Attention normal          Mood and Affect: Mood normal          Speech: Speech normal          Behavior: Behavior normal

## 2022-03-22 ENCOUNTER — OFFICE VISIT (OUTPATIENT)
Dept: FAMILY MEDICINE CLINIC | Facility: CLINIC | Age: 62
End: 2022-03-22
Payer: COMMERCIAL

## 2022-03-22 VITALS
OXYGEN SATURATION: 97 % | HEART RATE: 92 BPM | TEMPERATURE: 98.6 F | SYSTOLIC BLOOD PRESSURE: 168 MMHG | HEIGHT: 64 IN | DIASTOLIC BLOOD PRESSURE: 88 MMHG | WEIGHT: 228.8 LBS | BODY MASS INDEX: 39.06 KG/M2

## 2022-03-22 DIAGNOSIS — L08.9 LEFT FOOT INFECTION: Primary | ICD-10-CM

## 2022-03-22 PROCEDURE — 3008F BODY MASS INDEX DOCD: CPT | Performed by: NURSE PRACTITIONER

## 2022-03-22 PROCEDURE — 1036F TOBACCO NON-USER: CPT | Performed by: NURSE PRACTITIONER

## 2022-03-22 PROCEDURE — 1111F DSCHRG MED/CURRENT MED MERGE: CPT | Performed by: NURSE PRACTITIONER

## 2022-03-22 PROCEDURE — 99213 OFFICE O/P EST LOW 20 MIN: CPT | Performed by: NURSE PRACTITIONER

## 2022-03-22 NOTE — PROGRESS NOTES
[facility]    ASSESSMENT AND PLAN     1  Left foot infection  Appears to be healing well  Less tender  No redness or drainage noted  Day 7/10 of Augmentin  Continue until course completed  Continue daily foot/skin checks  Advised to resume soaking daily  Continue to exfoliate dry, callused skin  Dry feet well  Apply lotion  She does have an appointment with Podiatry in April, but will have staff assist in see if we can move that appointment closer  Continue to monitor for signs of worsening infection and follow-up immediately if noted  Otherwise return in 3 months for routine checkup  Patient agreeable with plan            SUBJECTIVE       Patient ID: Feli Elena is a 62 y o  female  Chief Complaint   Patient presents with    Follow-up     left foot infection       HISTORY OF PRESENT ILLNESS    Follow up to foot infection  Doing better  Much less tender   Stopped soaking foot as it started to feel better        The following portions of the patient's history were reviewed and updated as appropriate: allergies, current medications, past family history, past medical history, past social history, past surgical history and problem list     REVIEW OF SYSTEMS  Review of Systems   Skin:        Infected side of foot       OBJECTIVE      VITAL SIGNS  /88 (BP Location: Right arm, Patient Position: Sitting, Cuff Size: Adult)   Pulse 92   Temp 98 6 °F (37 °C)   Ht 5' 4" (1 626 m)   Wt 104 kg (228 lb 12 8 oz)   SpO2 97%   BMI 39 27 kg/m²     CURRENT MEDICATIONS    Current Outpatient Medications:     ALPRAZolam (XANAX) 0 25 mg tablet, Take 1 tablet (0 25 mg total) by mouth daily as needed for anxiety (panic attacks), Disp: 10 tablet, Rfl: 0    amLODIPine (NORVASC) 10 mg tablet, TAKE 1 TABLET BY MOUTH EVERY DAY, Disp: 90 tablet, Rfl: 1    amoxicillin-clavulanate (Augmentin) 875-125 mg per tablet, Take 1 tablet by mouth every 12 (twelve) hours for 10 days, Disp: 20 tablet, Rfl: 0   aspirin 81 mg chewable tablet, Chew 81 mg, Disp: , Rfl:     atorvastatin (LIPITOR) 40 mg tablet, TAKE 1 TABLET BY MOUTH EVERY DAY, Disp: 90 tablet, Rfl: 1    Cholecalciferol (Vitamin D-3) 25 MCG (1000 UT) CAPS, Take 2,000 Units by mouth daily, Disp: , Rfl:     clotrimazole-betamethasone (LOTRISONE) 1-0 05 % cream, Apply topically 2 (two) times a day, Disp: 30 g, Rfl: 0    Continuous Blood Gluc  (FreeStyle Noé 14 Day [place]) LITO, Use 1 Units continuous, Disp: 1 each, Rfl: 0    Continuous Blood Gluc Sensor (FreeStyle Noé 14 Day Sensor) MISC, Use 1 Units every 14 (fourteen) days, Disp: 2 each, Rfl: 6    Doxylamine Succinate, Sleep, (UNISOM PO), Take by mouth (Patient not taking: Reported on 3/16/2022 ), Disp: , Rfl:     Dulaglutide (Trulicity) 9 29 RV/8 7ZY SOPN, Inject 0 5 mL (0 75 mg total) under the skin once a week Tuesday (Patient not taking: Reported on 3/16/2022 ), Disp: , Rfl:     gabapentin (NEURONTIN) 100 mg capsule, TAKE 2 CAPSULES BY MOUTH 2 TIMES A DAY   (Patient taking differently: 200 mg  ), Disp: 360 capsule, Rfl: 0    glucose blood (True Metrix Blood Glucose Test) test strip, Use 1 each 4 (four) times a day (Patient not taking: Reported on 10/18/2021), Disp: 400 strip, Rfl: 0    insulin glargine (Lantus SoloStar) 100 units/mL injection pen, Inject 60 Units under the skin daily at bedtime, Disp: , Rfl:     Insulin Pen Needle (Pen Needles) 32G X 5 MM MISC, Use with insulin four times daily, Disp: 100 each, Rfl: 3    Lancets 28G MISC, Use 1 each 4 (four) times a day (Patient not taking: Reported on 12/21/2021 ), Disp: 400 each, Rfl: 0    lisinopril (ZESTRIL) 40 mg tablet, TAKE 1 TABLET BY MOUTH EVERY DAY, Disp: 90 tablet, Rfl: 1    metoprolol succinate (TOPROL-XL) 25 mg 24 hr tablet, TAKE 1 TABLET BY MOUTH EVERY DAY, Disp: 90 tablet, Rfl: 0    Multiple Vitamin (multivitamin) tablet, Take 1 tablet by mouth daily, Disp: , Rfl:     NovoLOG FlexPen 100 units/mL injection pen, INJECT 20 UNITS UNDER THE SKIN 3 TIMES A DAY WITH MEALS, Disp: 15 mL, Rfl: 1    pantoprazole (PROTONIX) 40 mg tablet, Take 1 tablet (40 mg total) by mouth daily for 14 days, Disp: 14 tablet, Rfl: 0    potassium chloride (K-DUR,KLOR-CON) 10 mEq tablet, Take 2 tablets (20 mEq total) by mouth 2 (two) times a day for 2 doses, Disp: 4 tablet, Rfl: 0    sertraline (ZOLOFT) 100 mg tablet, TAKE 1 TABLET BY MOUTH EVERY DAY, Disp: 90 tablet, Rfl: 0      PHYSICAL EXAMINATION   Physical Exam  Vitals and nursing note reviewed  Constitutional:       General: She is not in acute distress  Appearance: Normal appearance  She is well-developed  She is not ill-appearing  Pulmonary:      Effort: Pulmonary effort is normal  No respiratory distress  Skin:     General: Skin is warm and dry  Comments: Foot infection healing - lateral left foot  No redness or swelling  No drainage  Mildly tender to touch  But able to walk and place pressure on area now  Still with thick, calloused skin   Neurological:      General: No focal deficit present  Mental Status: She is alert and oriented to person, place, and time     Psychiatric:         Attention and Perception: Attention normal          Mood and Affect: Mood normal          Speech: Speech normal          Behavior: Behavior normal

## 2022-03-30 DIAGNOSIS — IMO0002 UNCONTROLLED TYPE 2 DIABETES WITH NEUROPATHY: ICD-10-CM

## 2022-03-30 DIAGNOSIS — Z79.4 TYPE 2 DIABETES MELLITUS WITH HYPERGLYCEMIA, WITH LONG-TERM CURRENT USE OF INSULIN (HCC): ICD-10-CM

## 2022-03-30 DIAGNOSIS — E11.65 TYPE 2 DIABETES MELLITUS WITH HYPERGLYCEMIA, WITH LONG-TERM CURRENT USE OF INSULIN (HCC): ICD-10-CM

## 2022-03-30 NOTE — TELEPHONE ENCOUNTER
Abrasions  Abrasions are skin scrapes. Their treatment depends on how large and deep the abrasion is.  Home care   You may be prescribed an antibiotic cream or ointment to apply to the wound. This helps prevent infection. Follow instructions when using this medication.  General care  · To care for the abrasion, do the following each day for as long as directed by your health care provider.  ¨ If you were given a bandage, change it once a day. If your bandage sticks to the wound, soak it in warm water until it loosens.  ¨ Wash the area with soap and warm water. You may do this in a sink or under a tub faucet or shower. Rinse off the soap. Then pat the area dry with a clean towel.  ¨ If antibiotic ointment or cream was prescribed, reapply it to the wound as directed. Cover the wound with a fresh non-stick bandage. If the bandage becomes wet or dirty, change it as soon as possible.  · You may use acetaminophen or ibuprofen to control pain unless another pain medication was prescribed. Note: If you have chronic liver or kidney disease or ever had a stomach ulcer or GI bleeding, talk with your health care provider before using these medications. Do not use ibuprofen in children under six months of age.  · Most skin wounds heal within ten days. But an infection may occur despite treatment. Therefore, monitor the wound for signs of infection as listed below.  Follow-up care  Follow up with your health care provider, or as advised.  When to seek medical advice  Call your health care provider right away if any of these occur:  · Fever of 101ºF (38.3ºC) or higher, or as directed by your health care provider  · Increasing pain, redness, swelling, or drainage from the wound  · Bleeding from the wound that does not stop after a few minutes of steady, firm pressure  · Decreased ability to move any body part near wound  © 9128-8170 [employer]. [address] [place] [address], [place] [address]. All rights reserved. This  Pt called requesting refills       Last appt, 10/18/21    Next appt, 4/26/22 information is not intended as a substitute for professional medical care. Always follow your healthcare professional's instructions.

## 2022-03-31 RX ORDER — INSULIN ASPART 100 [IU]/ML
20 INJECTION, SOLUTION INTRAVENOUS; SUBCUTANEOUS
Qty: 15 ML | Refills: 1 | Status: SHIPPED | OUTPATIENT
Start: 2022-03-31

## 2022-03-31 RX ORDER — DULAGLUTIDE 0.75 MG/.5ML
0.75 INJECTION, SOLUTION SUBCUTANEOUS WEEKLY
Qty: 2 ML | Refills: 1 | Status: SHIPPED | OUTPATIENT
Start: 2022-03-31 | End: 2022-05-05

## 2022-03-31 RX ORDER — INSULIN GLARGINE 100 [IU]/ML
60 INJECTION, SOLUTION SUBCUTANEOUS
Qty: 15 ML | Refills: 1 | Status: SHIPPED | OUTPATIENT
Start: 2022-03-31

## 2022-03-31 RX ORDER — FLASH GLUCOSE SENSOR
KIT MISCELLANEOUS
Qty: 2 EACH | Refills: 1 | Status: SHIPPED | OUTPATIENT
Start: 2022-03-31

## 2022-03-31 NOTE — CASE MANAGEMENT
Initial Clinical Review    Admission: Date/Time/Statement: 2/23/18 @ 2102     Orders Placed This Encounter   Procedures    Inpatient Admission (expected length of stay for this patient is greater than two midnights)     Standing Status:   Standing     Number of Occurrences:   1     Order Specific Question:   Admitting Physician     Answer:   Lynsey Snow [[id number]]     Order Specific Question:   Level of Care     Answer:   Med Surg [16]     Order Specific Question:   Estimated length of stay     Answer:   More than 2 Midnights     Order Specific Question:   Certification     Answer:   I certify that inpatient services are medically necessary for this patient for a duration of greater than two midnights  See H&P and MD Progress Notes for additional information about the patient's course of treatment  ED: Date/Time/Mode of Arrival:   ED Arrival Information     Expected Arrival Acuity Means of Arrival Escorted By Service Admission Type    - 2/23/2018 17:42 Urgent Ambulance [facility] General Medicine Urgent    Arrival Complaint    flu like symptoms          Chief Complaint:   Chief Complaint   Patient presents with    Flu Symptoms     pt  reports generalized weakness, diaphoresis and subjective fevers       History of Illness:    Romina Ponce is a 65 y o  female who presents with shortness of breath getting worse for 2 weeks  She has associated cough and recent vomiting and diarrhea  She has had a fever  She quit smoking 10 years ago  She denies sick contacts  She stopped all of her medications in November because she couldn't afford them  She started taking the zoloft again last week  She denies chest pain   No edema    ED Vital Signs:   ED Triage Vitals [02/23/18 1744]   Temperature Pulse Respirations Blood Pressure SpO2   100 3 °F (37 9 °C) (!) 111 20 (!) 232/111 96 %      Temp Source Heart Rate Source Patient Position - Orthostatic VS BP Location FiO2 (%)   Oral Monitor Sitting Right arm -- Pain Score       No Pain        Wt Readings from Last 1 Encounters:   02/23/18 86 2 kg (190 lb)       Vital Signs (abnormal):    above    Abnormal Labs/Diagnostic Test Results:    BNP  823  K  3 0  Alk phos     219  Albumin  3 2  WBC  13 29  Abs  neutro   11 69  Ct  Chest:     No evidence of acute pulmonary wasn't  Left lower lobe pneumonia   Smaller patchy focus of pneumonia anteriorly left upper lobe  Small bilateral pleural effusions    CXR;  Poss   B/L  infiltrates    ED Treatment:   Medication Administration from 02/23/2018 1742 to 02/24/2018 0139       Date/Time Order Dose Route Action Action by Comments     02/23/2018 1829 labetalol (NORMODYNE) injection 10 mg 10 mg Intravenous Given Yoko Clemente RN      02/23/2018 1949 labetalol (NORMODYNE) injection 10 mg 10 mg Intravenous Given Ophelia Lyons RN      02/23/2018 1954 potassium chloride (K-DUR,KLOR-CON) CR tablet 40 mEq 40 mEq Oral Given Ophelia Lyons RN      02/23/2018 2020 iohexol (OMNIPAQUE) 350 MG/ML injection (MULTI-DOSE) 85 mL 85 mL Intravenous Given Ophelia Friday 02/23/2018 2204 cefTRIAXone (ROCEPHIN) IVPB (premix) 1,000 mg 0 mg Intravenous Stopped Ophelia Lyons RN      02/23/2018 2130 cefTRIAXone (ROCEPHIN) IVPB (premix) 1,000 mg 1,000 mg Intravenous New Bag Ophelia Lyons RN      02/23/2018 2309 azithromycin (ZITHROMAX) 500 mg in sodium chloride 0 9% 250mL IVPB 500 mg 0 mg Intravenous Stopped Ophelia Lyons RN      02/23/2018 2205 azithromycin (ZITHROMAX) 500 mg in sodium chloride 0 9% 250mL IVPB 500 mg 500 mg Intravenous New Bag Ophelia Lyons RN      02/23/2018 2211 sertraline (ZOLOFT) tablet 50 mg 50 mg Oral Given Ophelia Lyons RN      02/23/2018 2205 sodium chloride 0 9 % with KCl 20 mEq/L infusion (premix) 125 mL/hr Intravenous New Bag Ophelia Lyons RN      02/23/2018 2223 ondansetron (ZOFRAN) injection 4 mg 4 mg Intravenous Given Ophelia Lyons RN      02/23/2018 2215 insulin lispro (HumaLOG) 100 units/mL subcutaneous injection 1-5 Units 3 Units Subcutaneous Given Ophelia Lyons RN           Past Medical/Surgical History: Active Ambulatory Problems     Diagnosis Date Noted    No Active Ambulatory Problems     Resolved Ambulatory Problems     Diagnosis Date Noted    No Resolved Ambulatory Problems     Past Medical History:   Diagnosis Date    [facility] ([facility] )     Esophageal reflux     Hyperlipidemia     Hypertension     Stroke [facility])        Admitting Diagnosis: Pneumonia [J18 9]  SOB (shortness of breath) [R06 02]  Accelerated hypertension [I10]  Hypoxia [R09 02]  Flu-like symptoms [R68 89]    Age/Sex: 65 y o  female    · Assessment/Plan:   pneumonia  ? Admit to med/surg  Continue oxygen via nasal cannula  Continue rocephin and zithromax  CTA showed no PE  Check blood cultures and flu swab  Droplet precautions for now     Plan for Additional Problems:   · HTN- BP elevated in ED, improved after labetalol  She stopped all her home meds in novemeber, will resume and monitor BP closely  · Hyperlipidemia- resume statin  · GERD- resume protonix  · Diabetes- patient does not remember the lantus dose she was taking in November  Will check a1C  Will order sliding scale with coverage for now  May need to resume long acting insulin as well       VTE Prophylaxis: Enoxaparin (Lovenox)  / sequential compression device   Code Status: full code  POLST: There is no POLST form on file for this patient (pre-hospital)     Anticipated Length of Stay:  Patient will be admitted on an Inpatient basis with an anticipated length of stay of  Greater than 2 midnights     Justification for Hospital Stay: patient requires IV antibiotics    Admission Orders:   IP    2/23  @   4635  Scheduled Meds:   Current Facility-Administered Medications:  acetaminophen 650 mg Oral Q6H PRN Genaro Farnsworth PA-C    ALPRAZolam 0 5 mg Oral TID PRN Genaro Farnsworth PA-C    aluminum-magnesium hydroxide-simethicone 30 mL Oral Q6H PRN Genaro Farnsworth PA-C    amLODIPine 10 mg Oral Daily Charmayne Honey KETURAH Menezes    aspirin 81 mg Oral Daily Paz Clark, KETURAH    atorvastatin 40 mg Oral Daily With [employer], KETURAH    cefTRIAXone 1,000 mg Intravenous Q24H Paz Clark PA-C    And        azithromycin 500 mg Intravenous Q24H RAMAN Bell-MORELIA    enoxaparin 40 mg Subcutaneous Daily RAMAN Bell-MORELIA    insulin lispro 1-5 Units Subcutaneous HS Paz Clark, PA-C    insulin lispro 2-12 Units Subcutaneous TID AC Paz Clark, PA-MORELIA    lisinopril 40 mg Oral Daily Paz Clark, PA-MORELIA    metoprolol 5 mg Intravenous Q6H PRN Paz Clark, PA-MORELIA    metoprolol succinate 100 mg Oral Daily RAMAN Bell-MORELIA    ondansetron 4 mg Intravenous Q6H PRN Paz Clark, PA-C    pantoprazole 40 mg Oral Early Morning RAMAN Bell-C    sertraline 100 mg Oral Daily Paz Clark, PA-C    sertraline 50 mg Oral HS RAMAN Bell-C    sodium chloride 0 9 % with KCl 20 mEq/L 125 mL/hr Intravenous Continuous Paz Clark PA-C Last Rate: 125 mL/hr (02/24/18 1007)     Continuous Infusions:   sodium chloride 0 9 % with KCl 20 mEq/L 125 mL/hr Last Rate: 125 mL/hr (02/24/18 1007)     PRN Meds:   acetaminophen    ALPRAZolam    aluminum-magnesium hydroxide-simethicone    metoprolol    ondansetron     Tele  CCD diet  O2  2L  Droplet precautions    Thank you,  [facility] in the [facility] [place] for 2017  Network Utilization Review Department  Phone: [phone]; Fax [phone]  ATTENTION: The Network Utilization Review Department is now centralized for our 7 Facilities  Make a note that we have a new phone and fax numbers for our Department  Please call with any questions or concerns to [phone] and carefully follow the prompts so that you are directed to the right person   All voicemails are confidential  Fax any determinations, approvals, denials, and requests for initial or continue stay review clinical to [phone]  Due to HIGH CALL volume, it would be easier if you could please send faxed requests to expedite your requests and in part, help us provide discharge notifications faster  No

## 2022-04-13 ENCOUNTER — OFFICE VISIT (OUTPATIENT)
Dept: PODIATRY | Facility: CLINIC | Age: 62
End: 2022-04-13
Payer: COMMERCIAL

## 2022-04-13 VITALS
HEIGHT: 64 IN | DIASTOLIC BLOOD PRESSURE: 78 MMHG | WEIGHT: 227.4 LBS | BODY MASS INDEX: 38.82 KG/M2 | SYSTOLIC BLOOD PRESSURE: 144 MMHG

## 2022-04-13 DIAGNOSIS — R23.4 FISSURE OF SKIN: Primary | ICD-10-CM

## 2022-04-13 DIAGNOSIS — L08.9 LEFT FOOT INFECTION: ICD-10-CM

## 2022-04-13 DIAGNOSIS — L85.3 XEROSIS OF SKIN: ICD-10-CM

## 2022-04-13 DIAGNOSIS — B35.3 TINEA PEDIS OF BOTH FEET: ICD-10-CM

## 2022-04-13 DIAGNOSIS — IMO0002 UNCONTROLLED TYPE 2 DIABETES WITH NEUROPATHY: ICD-10-CM

## 2022-04-13 DIAGNOSIS — E11.42 DIABETIC PERIPHERAL NEUROPATHY (HCC): ICD-10-CM

## 2022-04-13 PROCEDURE — 99202 OFFICE O/P NEW SF 15 MIN: CPT | Performed by: PODIATRIST

## 2022-04-13 RX ORDER — CEPHALEXIN 500 MG/1
500 CAPSULE ORAL 3 TIMES DAILY
Qty: 30 CAPSULE | Refills: 0 | Status: SHIPPED | OUTPATIENT
Start: 2022-04-13 | End: 2022-04-23

## 2022-04-13 NOTE — PROGRESS NOTES
Assessment/Plan:    Explained to patient that her left heel pain is due to an infected heel fissure  Patient placed on cephalexin 500 mg t i d   Also Lidex ointment b i d  to the fissure  She will be reassessed in 2 weeks  If healed sufficiently, we will begin bag balm  Patient urged to refrain from walking barefoot  No problem-specific Assessment & Plan notes found for this encounter  Diagnoses and all orders for this visit:    Fissure of skin    Uncontrolled type 2 diabetes with neuropathy  -     Ambulatory Referral to Podiatry    Diabetic peripheral neuropathy ([facility] [address] )  -     Ambulatory Referral to Podiatry    Left foot infection  -     Ambulatory Referral to Podiatry  -     cephalexin (KEFLEX) 500 mg capsule; Take 1 capsule (500 mg total) by mouth 3 (three) times a day for 10 days    Tinea pedis of both feet  -     Ambulatory Referral to Podiatry    Xerosis of skin          Subjective:      Patient ID: Chester Snow is a 62 y o  female  HPI     Patient, a poorly controlled type 2 diabetic female presents with severe pain in her left heel due to a heel fissure  The skin on the left heel is extremely dry and dried blood is noted due to the fissure  Patient has been in pain for the past 3 weeks  She thought that she might have irritated the site with a stick  Patient has a tendency to walk barefoot in her home  Patient has been on antibiotics, amoxicillin approximately 3 weeks ago but did not find it helping her heel  Patient has been diagnosed with diabetic neuropathy  She relates a combination of numbness, tingling and burning in her feet  I personally reviewed A1c dated 03/16/2022  It was 9 4    I personally reviewed A1c dated 08/12/2021  It was 13 3      The following portions of the patient's history were reviewed and updated as appropriate: allergies, current medications, past family history, past medical history, past social history, past surgical history and problem list     Review of Systems   Eyes:        Retinopathy   Musculoskeletal: Positive for gait problem  Neurological:        Paresthesia   Hematological: Negative  Psychiatric/Behavioral: Negative  Objective:      /78   Ht 5' 4" (1 626 m)   Wt 103 kg (227 lb 6 4 oz)   BMI 39 03 kg/m²          Physical Exam  Constitutional:       Appearance: She is obese  Cardiovascular:      Comments: Trace pedal pulses bilateral  Musculoskeletal:         General: Normal range of motion  Skin:     Findings: Rash present  Comments: The skin along the plantar lateral aspect left heel is extremely dry  The fissure is noted with dried blood  This fissure is very painful with pressure  There is an area of telangiectasia along the lateral aspect of the heel  There are 3 streaks of rubor plantar aspect left heel  No purulent drainage noted  Neurological:      General: No focal deficit present  Mental Status: She is oriented to person, place, and time  Comments: Sensorium is intact per [place] monofilament both feet

## 2022-04-15 ENCOUNTER — TELEPHONE (OUTPATIENT)
Dept: PODIATRY | Facility: CLINIC | Age: 62
End: 2022-04-15

## 2022-04-15 DIAGNOSIS — E11.65 UNCONTROLLED TYPE 2 DIABETES MELLITUS WITH HYPERGLYCEMIA (HCC): ICD-10-CM

## 2022-04-15 DIAGNOSIS — I10 HYPERTENSION, UNSPECIFIED TYPE: ICD-10-CM

## 2022-04-15 DIAGNOSIS — F41.0 PANIC ATTACK: ICD-10-CM

## 2022-04-15 RX ORDER — BLOOD SUGAR DIAGNOSTIC
STRIP MISCELLANEOUS
Qty: 200 EACH | Refills: 3 | Status: SHIPPED | OUTPATIENT
Start: 2022-04-15 | End: 2022-04-15

## 2022-04-15 RX ORDER — ALPRAZOLAM 0.25 MG/1
0.25 TABLET ORAL DAILY PRN
Qty: 10 TABLET | Refills: 0 | Status: SHIPPED | OUTPATIENT
Start: 2022-04-15

## 2022-04-15 RX ORDER — PEN NEEDLE, DIABETIC 31 GX5/16"
NEEDLE, DISPOSABLE MISCELLANEOUS
Qty: 200 EACH | Refills: 3 | Status: SHIPPED | OUTPATIENT
Start: 2022-04-15

## 2022-04-15 RX ORDER — METOPROLOL SUCCINATE 25 MG/1
25 TABLET, EXTENDED RELEASE ORAL DAILY
Qty: 90 TABLET | Refills: 0 | Status: SHIPPED | OUTPATIENT
Start: 2022-04-15 | End: 2022-06-27

## 2022-04-15 NOTE — TELEPHONE ENCOUNTER
This patient called  She is calling to verify that her prescription of  clotrimazole-betamethasone (LOTRISONE) 1-0 05 % cream  Was called in, as pharmacy says they never received the prescription  Please advise  Thank you

## 2022-04-22 ENCOUNTER — VBI (OUTPATIENT)
Dept: ADMINISTRATIVE | Facility: OTHER | Age: 62
End: 2022-04-22

## 2022-04-27 ENCOUNTER — OFFICE VISIT (OUTPATIENT)
Dept: PODIATRY | Facility: CLINIC | Age: 62
End: 2022-04-27
Payer: COMMERCIAL

## 2022-04-27 VITALS
HEIGHT: 64 IN | WEIGHT: 227 LBS | BODY MASS INDEX: 38.76 KG/M2 | DIASTOLIC BLOOD PRESSURE: 70 MMHG | SYSTOLIC BLOOD PRESSURE: 138 MMHG

## 2022-04-27 DIAGNOSIS — M79.672 LEFT FOOT PAIN: ICD-10-CM

## 2022-04-27 DIAGNOSIS — E11.42 DIABETIC PERIPHERAL NEUROPATHY (HCC): ICD-10-CM

## 2022-04-27 DIAGNOSIS — R23.4 FISSURE OF SKIN: Primary | ICD-10-CM

## 2022-04-27 PROCEDURE — 3078F DIAST BP <80 MM HG: CPT | Performed by: PODIATRIST

## 2022-04-27 PROCEDURE — 3075F SYST BP GE 130 - 139MM HG: CPT | Performed by: PODIATRIST

## 2022-04-27 PROCEDURE — 1036F TOBACCO NON-USER: CPT | Performed by: PODIATRIST

## 2022-04-27 PROCEDURE — 3008F BODY MASS INDEX DOCD: CPT | Performed by: PODIATRIST

## 2022-04-27 PROCEDURE — 99213 OFFICE O/P EST LOW 20 MIN: CPT | Performed by: PODIATRIST

## 2022-04-27 RX ORDER — FLUOCINONIDE 0.5 MG/G
OINTMENT TOPICAL 2 TIMES DAILY
Qty: 30 G | Refills: 0 | Status: SHIPPED | OUTPATIENT
Start: 2022-04-27

## 2022-04-27 RX ORDER — TRAMADOL HYDROCHLORIDE 50 MG/1
50 TABLET ORAL EVERY 6 HOURS PRN
Qty: 20 TABLET | Refills: 0 | Status: SHIPPED | OUTPATIENT
Start: 2022-04-27 | End: 2022-05-02

## 2022-04-27 NOTE — PROGRESS NOTES
Assessment/Plan:    Again prescribed Lidex ointment for b i d  application to left heel  It is anticipated the patient will have decreased pain in 10 days  Due to the severe pain reported, prescribed tramadol 50 mg q 6h for 5 days  After 10 days, patient to begin bag balm nocturnally  She will be reassessed in 3 weeks  No problem-specific Assessment & Plan notes found for this encounter  Diagnoses and all orders for this visit:    Fissure of skin  -     fluocinonide (LIDEX) 0 05 % ointment; Apply topically 2 (two) times a day  -     traMADol (Ultram) 50 mg tablet; Take 1 tablet (50 mg total) by mouth every 6 (six) hours as needed for moderate pain for up to 5 days    Left foot pain  -     traMADol (Ultram) 50 mg tablet; Take 1 tablet (50 mg total) by mouth every 6 (six) hours as needed for moderate pain for up to 5 days    Diabetic peripheral neuropathy (HCC)  -     traMADol (Ultram) 50 mg tablet; Take 1 tablet (50 mg total) by mouth every 6 (six) hours as needed for moderate pain for up to 5 days          Subjective:      Patient ID: Spencer Syed is a 62 y o  female  HPI     Patient presents for assessment of left heel  Patient did not get the Lidex ointment as it was not at [facility] when she went for it  She did get the antibiotics  She still has severe pain in the heel when she bears weight on it due to the fissure  Infection appears to have been resolved however  The following portions of the patient's history were reviewed and updated as appropriate: allergies, current medications, past family history, past medical history, past social history, past surgical history and problem list     Review of Systems   Musculoskeletal: Negative  Skin: Positive for rash  Neurological:        Paresthesia both feet   Psychiatric/Behavioral: Negative                Objective:      /70   Ht 5' 4" (1 626 m)   Wt 103 kg (227 lb)   BMI 38 96 kg/m²          Physical Exam  Constitutional: Appearance: She is obese  Cardiovascular:      Pulses: Normal pulses  Musculoskeletal:         General: Deformity present  Comments: Degenerative changes noted right instep  Skin:     Comments: Dry skin with fissure posterior lateral aspect left heel  Neurological:      General: No focal deficit present  Mental Status: She is oriented to person, place, and time  Sensory: Sensory deficit present

## 2022-05-03 ENCOUNTER — TELEPHONE (OUTPATIENT)
Dept: PODIATRY | Facility: CLINIC | Age: 62
End: 2022-05-03

## 2022-05-03 NOTE — TELEPHONE ENCOUNTER
Patient called  She spilled out her medication in the sink  Can we send over another prescription to her pharmacy? Thank you

## 2022-05-04 ENCOUNTER — TELEPHONE (OUTPATIENT)
Dept: PODIATRY | Facility: CLINIC | Age: 62
End: 2022-05-04

## 2022-05-04 DIAGNOSIS — F41.9 ANXIETY: ICD-10-CM

## 2022-05-04 RX ORDER — SERTRALINE HYDROCHLORIDE 100 MG/1
TABLET, FILM COATED ORAL
Qty: 90 TABLET | Refills: 0 | Status: SHIPPED | OUTPATIENT
Start: 2022-05-04 | End: 2022-08-01

## 2022-05-05 ENCOUNTER — VBI (OUTPATIENT)
Dept: ADMINISTRATIVE | Facility: OTHER | Age: 62
End: 2022-05-05

## 2022-05-05 DIAGNOSIS — Z79.4 TYPE 2 DIABETES MELLITUS WITH HYPERGLYCEMIA, WITH LONG-TERM CURRENT USE OF INSULIN (HCC): ICD-10-CM

## 2022-05-05 DIAGNOSIS — E11.65 TYPE 2 DIABETES MELLITUS WITH HYPERGLYCEMIA, WITH LONG-TERM CURRENT USE OF INSULIN (HCC): ICD-10-CM

## 2022-05-05 RX ORDER — DULAGLUTIDE 0.75 MG/.5ML
0.75 INJECTION, SOLUTION SUBCUTANEOUS WEEKLY
Qty: 2 ML | Refills: 1 | Status: SHIPPED | OUTPATIENT
Start: 2022-05-05

## 2022-05-25 DIAGNOSIS — E11.42 DIABETIC PERIPHERAL NEUROPATHY (HCC): ICD-10-CM

## 2022-05-25 RX ORDER — GABAPENTIN 100 MG/1
CAPSULE ORAL
Qty: 360 CAPSULE | Refills: 0 | Status: SHIPPED | OUTPATIENT
Start: 2022-05-25

## 2022-06-12 ENCOUNTER — TELEPHONE (OUTPATIENT)
Dept: OTHER | Facility: OTHER | Age: 62
End: 2022-06-12

## 2022-06-12 NOTE — TELEPHONE ENCOUNTER
Patient called and canceled her appointment because she is unable to make the appointment and will call back the office to reschedule her appointment

## 2022-06-27 DIAGNOSIS — I10 HYPERTENSION, UNSPECIFIED TYPE: ICD-10-CM

## 2022-06-27 RX ORDER — METOPROLOL SUCCINATE 25 MG/1
25 TABLET, EXTENDED RELEASE ORAL DAILY
Qty: 90 TABLET | Refills: 0 | Status: SHIPPED | OUTPATIENT
Start: 2022-06-27

## 2022-06-30 ENCOUNTER — VBI (OUTPATIENT)
Dept: ADMINISTRATIVE | Facility: OTHER | Age: 62
End: 2022-06-30

## 2022-07-19 ENCOUNTER — VBI (OUTPATIENT)
Dept: ADMINISTRATIVE | Facility: OTHER | Age: 62
End: 2022-07-19

## 2022-08-03 ENCOUNTER — VBI (OUTPATIENT)
Dept: ADMINISTRATIVE | Facility: OTHER | Age: 62
End: 2022-08-03

## 2022-08-25 ENCOUNTER — VBI (OUTPATIENT)
Dept: ADMINISTRATIVE | Facility: OTHER | Age: 62
End: 2022-08-25

## 2022-08-27 DIAGNOSIS — E11.42 DIABETIC PERIPHERAL NEUROPATHY (HCC): ICD-10-CM

## 2022-08-27 RX ORDER — GABAPENTIN 100 MG/1
CAPSULE ORAL
Qty: 360 CAPSULE | Refills: 0 | Status: ON HOLD | OUTPATIENT
Start: 2022-08-27

## 2022-09-05 DIAGNOSIS — I10 HYPERTENSION, UNSPECIFIED TYPE: ICD-10-CM

## 2022-09-07 RX ORDER — LISINOPRIL 40 MG/1
TABLET ORAL
Qty: 90 TABLET | Refills: 1 | Status: ON HOLD | OUTPATIENT
Start: 2022-09-07

## 2022-09-23 DIAGNOSIS — I10 BENIGN ESSENTIAL HYPERTENSION: ICD-10-CM

## 2022-09-24 ENCOUNTER — TELEPHONE (OUTPATIENT)
Dept: OTHER | Facility: OTHER | Age: 62
End: 2022-09-24

## 2022-09-24 NOTE — TELEPHONE ENCOUNTER
Patient is calling regarding cancelling an appointment      Date/Time: 09/26 @1:30  Patient was rescheduled: YES [] NO [x]    Patient requesting call back to reschedule: YES [x] NO []

## 2022-09-28 RX ORDER — AMLODIPINE BESYLATE 10 MG/1
TABLET ORAL
Qty: 90 TABLET | Refills: 1 | Status: ON HOLD | OUTPATIENT
Start: 2022-09-28

## 2022-09-29 DIAGNOSIS — I10 HYPERTENSION, UNSPECIFIED TYPE: ICD-10-CM

## 2022-10-02 RX ORDER — METOPROLOL SUCCINATE 25 MG/1
25 TABLET, EXTENDED RELEASE ORAL DAILY
Qty: 90 TABLET | Refills: 0 | Status: ON HOLD | OUTPATIENT
Start: 2022-10-02

## 2022-10-21 ENCOUNTER — HOSPITAL ENCOUNTER (INPATIENT)
Facility: HOSPITAL | Age: 62
DRG: 853 | End: 2022-10-21
Attending: EMERGENCY MEDICINE | Admitting: INTERNAL MEDICINE
Payer: COMMERCIAL

## 2022-10-21 ENCOUNTER — APPOINTMENT (EMERGENCY)
Dept: CT IMAGING | Facility: HOSPITAL | Age: 62
DRG: 853 | End: 2022-10-21
Payer: COMMERCIAL

## 2022-10-21 ENCOUNTER — APPOINTMENT (EMERGENCY)
Dept: RADIOLOGY | Facility: HOSPITAL | Age: 62
DRG: 853 | End: 2022-10-21
Payer: COMMERCIAL

## 2022-10-21 ENCOUNTER — OFFICE VISIT (OUTPATIENT)
Dept: FAMILY MEDICINE CLINIC | Facility: CLINIC | Age: 62
End: 2022-10-21
Payer: COMMERCIAL

## 2022-10-21 VITALS
BODY MASS INDEX: 37.05 KG/M2 | HEIGHT: 64 IN | HEART RATE: 99 BPM | DIASTOLIC BLOOD PRESSURE: 90 MMHG | OXYGEN SATURATION: 97 % | SYSTOLIC BLOOD PRESSURE: 160 MMHG | TEMPERATURE: 97.7 F | WEIGHT: 217 LBS | RESPIRATION RATE: 16 BRPM

## 2022-10-21 DIAGNOSIS — F41.9 ANXIETY: ICD-10-CM

## 2022-10-21 DIAGNOSIS — S78.119A AMPUTATION ABOVE KNEE (HCC): ICD-10-CM

## 2022-10-21 DIAGNOSIS — N39.0 UTI (URINARY TRACT INFECTION): ICD-10-CM

## 2022-10-21 DIAGNOSIS — A41.9 SEPSIS (HCC): ICD-10-CM

## 2022-10-21 DIAGNOSIS — Z01.818 PREOPERATIVE CLEARANCE: ICD-10-CM

## 2022-10-21 DIAGNOSIS — F33.9 DEPRESSION, RECURRENT (HCC): ICD-10-CM

## 2022-10-21 DIAGNOSIS — S81.809A WOUND OF LOWER EXTREMITY, UNSPECIFIED LATERALITY, INITIAL ENCOUNTER: Primary | ICD-10-CM

## 2022-10-21 DIAGNOSIS — I96 NECROTIC TOES (HCC): ICD-10-CM

## 2022-10-21 DIAGNOSIS — I10 HYPERTENSION: ICD-10-CM

## 2022-10-21 DIAGNOSIS — I63.9 CVA (CEREBRAL VASCULAR ACCIDENT) (HCC): ICD-10-CM

## 2022-10-21 DIAGNOSIS — S91.302A: ICD-10-CM

## 2022-10-21 DIAGNOSIS — D64.9 ANEMIA: ICD-10-CM

## 2022-10-21 DIAGNOSIS — S81.801A WOUND OF RIGHT LOWER EXTREMITY, INITIAL ENCOUNTER: ICD-10-CM

## 2022-10-21 DIAGNOSIS — D75.829 HIT (HEPARIN-INDUCED THROMBOCYTOPENIA) (HCC): ICD-10-CM

## 2022-10-21 DIAGNOSIS — I73.9 PAD (PERIPHERAL ARTERY DISEASE) (HCC): Primary | ICD-10-CM

## 2022-10-21 DIAGNOSIS — S91.302A NON HEALING LEFT HEEL WOUND: ICD-10-CM

## 2022-10-21 DIAGNOSIS — E87.6 HYPOKALEMIA: ICD-10-CM

## 2022-10-21 DIAGNOSIS — I70.209 STENOSIS OF FEMORAL ARTERY (HCC): ICD-10-CM

## 2022-10-21 DIAGNOSIS — R06.09 DOE (DYSPNEA ON EXERTION): ICD-10-CM

## 2022-10-21 DIAGNOSIS — Z79.4 TYPE 2 DIABETES MELLITUS WITH HYPERGLYCEMIA, WITH LONG-TERM CURRENT USE OF INSULIN (HCC): ICD-10-CM

## 2022-10-21 DIAGNOSIS — E11.65 TYPE 2 DIABETES MELLITUS WITH HYPERGLYCEMIA, WITH LONG-TERM CURRENT USE OF INSULIN (HCC): ICD-10-CM

## 2022-10-21 DIAGNOSIS — R78.81 BACTEREMIA: ICD-10-CM

## 2022-10-21 LAB
2HR DELTA HS TROPONIN: 0 NG/L
ALBUMIN SERPL BCP-MCNC: 2.3 G/DL (ref 3.5–5)
ALP SERPL-CCNC: 104 U/L (ref 46–116)
ALT SERPL W P-5'-P-CCNC: 15 U/L (ref 12–78)
ANION GAP SERPL CALCULATED.3IONS-SCNC: 10 MMOL/L (ref 4–13)
APTT PPP: 28 SECONDS (ref 23–37)
AST SERPL W P-5'-P-CCNC: 15 U/L (ref 5–45)
ATRIAL RATE: 81 BPM
BACTERIA UR QL AUTO: ABNORMAL /HPF
BASOPHILS # BLD AUTO: 0.08 THOUSANDS/ÂΜL (ref 0–0.1)
BASOPHILS NFR BLD AUTO: 1 % (ref 0–1)
BILIRUB SERPL-MCNC: 0.19 MG/DL (ref 0.2–1)
BILIRUB UR QL STRIP: NEGATIVE
BUN SERPL-MCNC: 16 MG/DL (ref 5–25)
CALCIUM ALBUM COR SERPL-MCNC: 10 MG/DL (ref 8.3–10.1)
CALCIUM SERPL-MCNC: 8.6 MG/DL (ref 8.3–10.1)
CARDIAC TROPONIN I PNL SERPL HS: 42 NG/L
CARDIAC TROPONIN I PNL SERPL HS: 42 NG/L
CHLORIDE SERPL-SCNC: 102 MMOL/L (ref 96–108)
CLARITY UR: ABNORMAL
CO2 SERPL-SCNC: 31 MMOL/L (ref 21–32)
COLOR UR: ABNORMAL
CREAT SERPL-MCNC: 1.1 MG/DL (ref 0.6–1.3)
EOSINOPHIL # BLD AUTO: 0.99 THOUSAND/ÂΜL (ref 0–0.61)
EOSINOPHIL NFR BLD AUTO: 7 % (ref 0–6)
ERYTHROCYTE [DISTWIDTH] IN BLOOD BY AUTOMATED COUNT: 14.3 % (ref 11.6–15.1)
FLUAV RNA RESP QL NAA+PROBE: NEGATIVE
FLUBV RNA RESP QL NAA+PROBE: NEGATIVE
GFR SERPL CREATININE-BSD FRML MDRD: 54 ML/MIN/1.73SQ M
GLUCOSE SERPL-MCNC: 165 MG/DL (ref 65–140)
GLUCOSE SERPL-MCNC: 176 MG/DL (ref 65–140)
GLUCOSE SERPL-MCNC: 303 MG/DL (ref 65–140)
GLUCOSE UR STRIP-MCNC: ABNORMAL MG/DL
HCT VFR BLD AUTO: 31.1 % (ref 34.8–46.1)
HGB BLD-MCNC: 10.1 G/DL (ref 11.5–15.4)
HGB UR QL STRIP.AUTO: ABNORMAL
IMM GRANULOCYTES # BLD AUTO: 0.12 THOUSAND/UL (ref 0–0.2)
IMM GRANULOCYTES NFR BLD AUTO: 1 % (ref 0–2)
INR PPP: 1 (ref 0.84–1.19)
KETONES UR STRIP-MCNC: NEGATIVE MG/DL
LACTATE SERPL-SCNC: 0.5 MMOL/L (ref 0.5–2)
LEUKOCYTE ESTERASE UR QL STRIP: NEGATIVE
LYMPHOCYTES # BLD AUTO: 2.3 THOUSANDS/ÂΜL (ref 0.6–4.47)
LYMPHOCYTES NFR BLD AUTO: 17 % (ref 14–44)
MAGNESIUM SERPL-MCNC: 1.6 MG/DL (ref 1.6–2.6)
MCH RBC QN AUTO: 26.7 PG (ref 26.8–34.3)
MCHC RBC AUTO-ENTMCNC: 32.5 G/DL (ref 31.4–37.4)
MCV RBC AUTO: 82 FL (ref 82–98)
MONOCYTES # BLD AUTO: 0.73 THOUSAND/ÂΜL (ref 0.17–1.22)
MONOCYTES NFR BLD AUTO: 6 % (ref 4–12)
NEUTROPHILS # BLD AUTO: 9.17 THOUSANDS/ÂΜL (ref 1.85–7.62)
NEUTS SEG NFR BLD AUTO: 68 % (ref 43–75)
NITRITE UR QL STRIP: NEGATIVE
NON-SQ EPI CELLS URNS QL MICRO: ABNORMAL /HPF
NRBC BLD AUTO-RTO: 0 /100 WBCS
NT-PROBNP SERPL-MCNC: 2405 PG/ML
P AXIS: 35 DEGREES
PH UR STRIP.AUTO: 6 [PH]
PLATELET # BLD AUTO: 351 THOUSANDS/UL (ref 149–390)
PMV BLD AUTO: 8.9 FL (ref 8.9–12.7)
POTASSIUM SERPL-SCNC: 2.4 MMOL/L (ref 3.5–5.3)
PR INTERVAL: 150 MS
PROCALCITONIN SERPL-MCNC: 0.06 NG/ML
PROT SERPL-MCNC: 6.6 G/DL (ref 6.4–8.4)
PROT UR STRIP-MCNC: >=300 MG/DL
PROTHROMBIN TIME: 13.2 SECONDS (ref 11.6–14.5)
QRS AXIS: 33 DEGREES
QRSD INTERVAL: 80 MS
QT INTERVAL: 434 MS
QTC INTERVAL: 504 MS
RBC # BLD AUTO: 3.78 MILLION/UL (ref 3.81–5.12)
RBC #/AREA URNS AUTO: ABNORMAL /HPF
RSV RNA RESP QL NAA+PROBE: NEGATIVE
SARS-COV-2 RNA RESP QL NAA+PROBE: NEGATIVE
SODIUM SERPL-SCNC: 143 MMOL/L (ref 135–147)
SP GR UR STRIP.AUTO: >=1.03 (ref 1–1.03)
T WAVE AXIS: 121 DEGREES
UROBILINOGEN UR QL STRIP.AUTO: 0.2 E.U./DL
VENTRICULAR RATE: 81 BPM
WBC # BLD AUTO: 13.39 THOUSAND/UL (ref 4.31–10.16)
WBC #/AREA URNS AUTO: ABNORMAL /HPF

## 2022-10-21 PROCEDURE — 85025 COMPLETE CBC W/AUTO DIFF WBC: CPT

## 2022-10-21 PROCEDURE — 96375 TX/PRO/DX INJ NEW DRUG ADDON: CPT

## 2022-10-21 PROCEDURE — 83735 ASSAY OF MAGNESIUM: CPT

## 2022-10-21 PROCEDURE — 96365 THER/PROPH/DIAG IV INF INIT: CPT

## 2022-10-21 PROCEDURE — 93010 ELECTROCARDIOGRAM REPORT: CPT | Performed by: STUDENT IN AN ORGANIZED HEALTH CARE EDUCATION/TRAINING PROGRAM

## 2022-10-21 PROCEDURE — 82948 REAGENT STRIP/BLOOD GLUCOSE: CPT

## 2022-10-21 PROCEDURE — 85730 THROMBOPLASTIN TIME PARTIAL: CPT

## 2022-10-21 PROCEDURE — 87086 URINE CULTURE/COLONY COUNT: CPT

## 2022-10-21 PROCEDURE — 99285 EMERGENCY DEPT VISIT HI MDM: CPT

## 2022-10-21 PROCEDURE — 99214 OFFICE O/P EST MOD 30 MIN: CPT | Performed by: NURSE PRACTITIONER

## 2022-10-21 PROCEDURE — 81001 URINALYSIS AUTO W/SCOPE: CPT

## 2022-10-21 PROCEDURE — 73701 CT LOWER EXTREMITY W/DYE: CPT

## 2022-10-21 PROCEDURE — 85610 PROTHROMBIN TIME: CPT

## 2022-10-21 PROCEDURE — 83605 ASSAY OF LACTIC ACID: CPT

## 2022-10-21 PROCEDURE — 73630 X-RAY EXAM OF FOOT: CPT

## 2022-10-21 PROCEDURE — 96367 TX/PROPH/DG ADDL SEQ IV INF: CPT

## 2022-10-21 PROCEDURE — 93005 ELECTROCARDIOGRAM TRACING: CPT

## 2022-10-21 PROCEDURE — 96366 THER/PROPH/DIAG IV INF ADDON: CPT

## 2022-10-21 PROCEDURE — 36415 COLL VENOUS BLD VENIPUNCTURE: CPT

## 2022-10-21 PROCEDURE — 0241U HB NFCT DS VIR RESP RNA 4 TRGT: CPT

## 2022-10-21 PROCEDURE — 83880 ASSAY OF NATRIURETIC PEPTIDE: CPT

## 2022-10-21 PROCEDURE — 71045 X-RAY EXAM CHEST 1 VIEW: CPT

## 2022-10-21 PROCEDURE — 84484 ASSAY OF TROPONIN QUANT: CPT

## 2022-10-21 PROCEDURE — 87077 CULTURE AEROBIC IDENTIFY: CPT

## 2022-10-21 PROCEDURE — 87040 BLOOD CULTURE FOR BACTERIA: CPT

## 2022-10-21 PROCEDURE — 84145 PROCALCITONIN (PCT): CPT

## 2022-10-21 PROCEDURE — 87186 SC STD MICRODIL/AGAR DIL: CPT

## 2022-10-21 PROCEDURE — 80053 COMPREHEN METABOLIC PANEL: CPT

## 2022-10-21 RX ORDER — METOPROLOL SUCCINATE 25 MG/1
25 TABLET, EXTENDED RELEASE ORAL DAILY
Status: DISCONTINUED | OUTPATIENT
Start: 2022-10-22 | End: 2022-10-22

## 2022-10-21 RX ORDER — AMLODIPINE BESYLATE 10 MG/1
10 TABLET ORAL ONCE
Status: COMPLETED | OUTPATIENT
Start: 2022-10-21 | End: 2022-10-21

## 2022-10-21 RX ORDER — MORPHINE SULFATE 4 MG/ML
4 INJECTION, SOLUTION INTRAMUSCULAR; INTRAVENOUS ONCE
Status: COMPLETED | OUTPATIENT
Start: 2022-10-21 | End: 2022-10-21

## 2022-10-21 RX ORDER — POTASSIUM CHLORIDE 14.9 MG/ML
20 INJECTION INTRAVENOUS ONCE
Status: COMPLETED | OUTPATIENT
Start: 2022-10-21 | End: 2022-10-22

## 2022-10-21 RX ORDER — POTASSIUM CHLORIDE 20 MEQ/1
40 TABLET, EXTENDED RELEASE ORAL ONCE
Status: COMPLETED | OUTPATIENT
Start: 2022-10-21 | End: 2022-10-21

## 2022-10-21 RX ORDER — ACETAMINOPHEN 325 MG/1
650 TABLET ORAL ONCE
Status: COMPLETED | OUTPATIENT
Start: 2022-10-21 | End: 2022-10-21

## 2022-10-21 RX ORDER — ALPRAZOLAM 0.25 MG/1
0.25 TABLET ORAL DAILY PRN
Qty: 10 TABLET | Refills: 0 | Status: CANCELLED | OUTPATIENT
Start: 2022-10-21

## 2022-10-21 RX ORDER — LISINOPRIL 20 MG/1
40 TABLET ORAL ONCE
Status: COMPLETED | OUTPATIENT
Start: 2022-10-21 | End: 2022-10-21

## 2022-10-21 RX ADMIN — MORPHINE SULFATE 4 MG: 4 INJECTION INTRAVENOUS at 22:50

## 2022-10-21 RX ADMIN — LISINOPRIL 40 MG: 20 TABLET ORAL at 23:58

## 2022-10-21 RX ADMIN — POTASSIUM CHLORIDE 40 MEQ: 1500 TABLET, EXTENDED RELEASE ORAL at 22:31

## 2022-10-21 RX ADMIN — SODIUM CHLORIDE 250 ML: 0.9 INJECTION, SOLUTION INTRAVENOUS at 23:19

## 2022-10-21 RX ADMIN — ACETAMINOPHEN 650 MG: 325 TABLET, FILM COATED ORAL at 21:23

## 2022-10-21 RX ADMIN — VANCOMYCIN HYDROCHLORIDE 1750 MG: 10 INJECTION, POWDER, LYOPHILIZED, FOR SOLUTION INTRAVENOUS at 23:22

## 2022-10-21 RX ADMIN — IOHEXOL 100 ML: 350 INJECTION, SOLUTION INTRAVENOUS at 23:03

## 2022-10-21 RX ADMIN — AMLODIPINE BESYLATE 10 MG: 10 TABLET ORAL at 23:58

## 2022-10-21 RX ADMIN — PIPERACILLIN SODIUM,TAZOBACTAM SODIUM 4.5 G: 4; .5 INJECTION, POWDER, FOR SOLUTION INTRAVENOUS at 21:19

## 2022-10-21 RX ADMIN — SODIUM CHLORIDE 500 ML: 0.9 INJECTION, SOLUTION INTRAVENOUS at 21:19

## 2022-10-21 RX ADMIN — POTASSIUM CHLORIDE 20 MEQ: 14.9 INJECTION, SOLUTION INTRAVENOUS at 22:28

## 2022-10-21 NOTE — ASSESSMENT & PLAN NOTE
Presents with vascular wounds  Lost to care since April  Concern today for infection, potential loss of limb  Will likely need IV antibiotic  C/O worsening wounds, significant pain and difficulty ambulating  Note large necrotic wound - left heel  Thick purulent drainage expressed  Two small, circular eschar wounds, left anterior shin  Intact with no drainage - but very painful to touch  Small scaly, dry wound left small toe  Necrotic right small toe  Patient is overdue for routine lab work, and management of her chronic conditions  Advised follow-up after ER-likely admission-for continued care  Stressed importance compliance moving forward  Patient agreeable with above, and will present to ER today with her daughter

## 2022-10-21 NOTE — PROGRESS NOTES
[facility]    ASSESSMENT AND PLAN     1  Wound of lower extremity, unspecified laterality, initial encounter  Assessment & Plan:  Presents with vascular wounds  Lost to care since April  Concern today for infection, potential loss of limb  Will likely need IV antibiotic  C/O worsening wounds, significant pain and difficulty ambulating  Note large necrotic wound - left heel  Thick purulent drainage expressed  Two small, circular eschar wounds, left anterior shin  Intact with no drainage - but very painful to touch  Small scaly, dry wound left small toe  Necrotic right small toe  Patient is overdue for routine lab work, and management of her chronic conditions  Advised follow-up after ER-likely admission-for continued care  Stressed importance compliance moving forward  Patient agreeable with above, and will present to ER today with her daughter  Orders:  -     Transfer to other facility         SUBJECTIVE       Patient ID: Ashwin Poole is a 65 y o  female  Chief Complaint   Patient presents with   • discuss health issues       HISTORY OF PRESENT ILLNESS    Presents for follow up - very over due  Admits to non compliance with self-care and daily medications  She has a history of uncontrolled diabetes - with most recent A1c in March:  9 4  Prior to that her A1cs were averaging 10-13  She has been non compliant with insulin  Has not been checking her blood sugars routinely  It is over a year since she has seen Endocrinology  Her last visit here was in March of 2022  She has had an ongoing left heel wound, that she was treating for with Podiatry  She reports it was almost healed, so she stopped applying the cream several weeks ago  Today complains of worsening bilateral lower extremity wounds  And her left heel is now black and painful  She reports difficulty ambulating, and noticed her heel has started draining yesterday          The following portions of the patient's history were reviewed and updated as appropriate: allergies, current medications, past family history, past medical history, past social history, past surgical history, and problem list     REVIEW OF SYSTEMS  Review of Systems   Constitutional: Positive for activity change (no motivation to do things - admits to sitting around most of her day), appetite change (decreased) and fatigue  Negative for fever  Respiratory: Negative  Cardiovascular: Negative  Gastrointestinal: Negative  Endocrine: Positive for polyuria  Skin: Positive for wound (as in HPI)  Neurological: Negative for headaches  Psychiatric/Behavioral: Positive for dysphoric mood  Negative for agitation, decreased concentration, self-injury, sleep disturbance and suicidal ideas  The patient is nervous/anxious  Depressed  No motivation   Afraid she is gong to die from her chronic illnesses       OBJECTIVE      VITAL SIGNS  /90 (BP Location: Left arm, Patient Position: Sitting, Cuff Size: Adult)   Pulse 99   Temp 97 7 °F (36 5 °C) (Temporal)   Resp 16   Ht 5' 3 78" (1 62 m)   Wt 98 4 kg (217 lb)   SpO2 97%   BMI 37 51 kg/m²     CURRENT MEDICATIONS    Current Outpatient Medications:   •  ALPRAZolam (XANAX) 0 25 mg tablet, Take 1 tablet (0 25 mg total) by mouth daily as needed for anxiety (panic attacks), Disp: 10 tablet, Rfl: 0  •  amLODIPine (NORVASC) 10 mg tablet, TAKE 1 TABLET BY MOUTH EVERY DAY, Disp: 90 tablet, Rfl: 1  •  aspirin 81 mg chewable tablet, Chew 81 mg, Disp: , Rfl:   •  atorvastatin (LIPITOR) 40 mg tablet, TAKE 1 TABLET BY MOUTH EVERY DAY, Disp: 90 tablet, Rfl: 1  •  Cholecalciferol (Vitamin D-3) 25 MCG (1000 UT) CAPS, Take 2,000 Units by mouth daily, Disp: , Rfl:   •  clotrimazole-betamethasone (LOTRISONE) 1-0 05 % cream, Apply topically 2 (two) times a day, Disp: 30 g, Rfl: 0  •  fluocinonide (LIDEX) 0 05 % ointment, Apply topically 2 (two) times a day, Disp: 30 g, Rfl: 0  •  gabapentin (NEURONTIN) 100 mg capsule, TAKE 2 CAPSULES BY MOUTH TWICE A DAY, Disp: 360 capsule, Rfl: 0  •  insulin aspart (NovoLOG FlexPen) 100 UNIT/ML injection pen, Inject 20 Units under the skin 3 (three) times a day with meals, Disp: 15 mL, Rfl: 1  •  insulin glargine (Lantus SoloStar) 100 units/mL injection pen, Inject 60 Units under the skin daily at bedtime, Disp: 15 mL, Rfl: 1  •  Insulin Pen Needle (B-D UF III MINI PEN NEEDLES) 31G X 5 MM MISC, USE WITH INSULIN FOUR TIMES DAILY, Disp: 200 each, Rfl: 3  •  lisinopril (ZESTRIL) 40 mg tablet, TAKE 1 TABLET BY MOUTH EVERY DAY, Disp: 90 tablet, Rfl: 1  •  metoprolol succinate (TOPROL-XL) 25 mg 24 hr tablet, TAKE 1 TABLET (25 MG TOTAL) BY MOUTH DAILY  , Disp: 90 tablet, Rfl: 0  •  Multiple Vitamin (multivitamin) tablet, Take 1 tablet by mouth daily, Disp: , Rfl:   •  sertraline (ZOLOFT) 100 mg tablet, TAKE 1 TABLET BY MOUTH EVERY DAY, Disp: 90 tablet, Rfl: 1  •  Trulicity 8 89 ZH/8 4GI SOPN, INJECT 0 5 ML (0 75 MG TOTAL) UNDER THE SKIN ONCE A WEEK TUESDAY, Disp: 2 mL, Rfl: 1  •  Continuous Blood Gluc  (FreeStyle Noé 14 Day [place]) LITO, Use 1 Units continuous (Patient not taking: Reported on 10/21/2022), Disp: 1 each, Rfl: 0  •  Continuous Blood Gluc Sensor (FreeStyle Noé 14 Day Sensor) MISC, Use daily as directed for CGM - Change every 14 days (Patient not taking: Reported on 10/21/2022), Disp: 2 each, Rfl: 1  •  Doxylamine Succinate, Sleep, (UNISOM PO), Take by mouth (Patient not taking: No sig reported), Disp: , Rfl:   •  glucose blood (True Metrix Blood Glucose Test) test strip, Use 1 each 4 (four) times a day (Patient not taking: No sig reported), Disp: 400 strip, Rfl: 0  •  Lancets 28G MISC, Use 1 each 4 (four) times a day (Patient not taking: No sig reported), Disp: 400 each, Rfl: 0  •  pantoprazole (PROTONIX) 40 mg tablet, Take 1 tablet (40 mg total) by mouth daily for 14 days, Disp: 14 tablet, Rfl: 0  •  potassium chloride (K-DUR,KLOR-CON) 10 mEq tablet, Take 2 tablets (20 mEq total) by mouth 2 (two) times a day for 2 doses, Disp: 4 tablet, Rfl: 0      PHYSICAL EXAMINATION   Physical Exam  Vitals and nursing note reviewed  Constitutional:       General: She is not in acute distress  Appearance: Normal appearance  She is not ill-appearing  HENT:      Head: Normocephalic  Pulmonary:      Effort: Pulmonary effort is normal  No respiratory distress  Musculoskeletal:        Feet:    Skin:         Neurological:      General: No focal deficit present  Mental Status: She is alert and oriented to person, place, and time  Psychiatric:         Attention and Perception: Attention normal          Mood and Affect: Mood normal          Behavior: Behavior normal          Thought Content:  Thought content normal          Judgment: Judgment normal

## 2022-10-22 PROBLEM — I16.0 HYPERTENSIVE URGENCY: Status: ACTIVE | Noted: 2022-10-22

## 2022-10-22 PROBLEM — E87.6 HYPOKALEMIA: Status: ACTIVE | Noted: 2022-10-22

## 2022-10-22 PROBLEM — R94.31 ABNORMAL EKG: Status: ACTIVE | Noted: 2022-10-22

## 2022-10-22 PROBLEM — R11.2 N&V (NAUSEA AND VOMITING): Status: ACTIVE | Noted: 2022-10-22

## 2022-10-22 PROBLEM — S91.302A NON HEALING LEFT HEEL WOUND: Status: ACTIVE | Noted: 2022-10-22

## 2022-10-22 PROBLEM — A41.9 SEPSIS (HCC): Status: ACTIVE | Noted: 2022-10-22

## 2022-10-22 LAB
4HR DELTA HS TROPONIN: -3 NG/L
ANION GAP SERPL CALCULATED.3IONS-SCNC: 8 MMOL/L (ref 4–13)
ANION GAP SERPL CALCULATED.3IONS-SCNC: 8 MMOL/L (ref 4–13)
ATRIAL RATE: 79 BPM
ATRIAL RATE: 82 BPM
BASOPHILS # BLD AUTO: 0.07 THOUSANDS/ÂΜL (ref 0–0.1)
BASOPHILS NFR BLD AUTO: 1 % (ref 0–1)
BUN SERPL-MCNC: 13 MG/DL (ref 5–25)
BUN SERPL-MCNC: 14 MG/DL (ref 5–25)
CALCIUM SERPL-MCNC: 8.2 MG/DL (ref 8.3–10.1)
CALCIUM SERPL-MCNC: 8.7 MG/DL (ref 8.3–10.1)
CARDIAC TROPONIN I PNL SERPL HS: 39 NG/L
CHLORIDE SERPL-SCNC: 102 MMOL/L (ref 96–108)
CHLORIDE SERPL-SCNC: 103 MMOL/L (ref 96–108)
CO2 SERPL-SCNC: 30 MMOL/L (ref 21–32)
CO2 SERPL-SCNC: 32 MMOL/L (ref 21–32)
CREAT SERPL-MCNC: 1.09 MG/DL (ref 0.6–1.3)
CREAT SERPL-MCNC: 1.26 MG/DL (ref 0.6–1.3)
EOSINOPHIL # BLD AUTO: 1 THOUSAND/ÂΜL (ref 0–0.61)
EOSINOPHIL NFR BLD AUTO: 7 % (ref 0–6)
ERYTHROCYTE [DISTWIDTH] IN BLOOD BY AUTOMATED COUNT: 14.5 % (ref 11.6–15.1)
GFR SERPL CREATININE-BSD FRML MDRD: 46 ML/MIN/1.73SQ M
GFR SERPL CREATININE-BSD FRML MDRD: 54 ML/MIN/1.73SQ M
GLUCOSE SERPL-MCNC: 165 MG/DL (ref 65–140)
GLUCOSE SERPL-MCNC: 203 MG/DL (ref 65–140)
GLUCOSE SERPL-MCNC: 207 MG/DL (ref 65–140)
GLUCOSE SERPL-MCNC: 312 MG/DL (ref 65–140)
GLUCOSE SERPL-MCNC: 328 MG/DL (ref 65–140)
HCT VFR BLD AUTO: 30.7 % (ref 34.8–46.1)
HGB BLD-MCNC: 10.1 G/DL (ref 11.5–15.4)
IMM GRANULOCYTES # BLD AUTO: 0.11 THOUSAND/UL (ref 0–0.2)
IMM GRANULOCYTES NFR BLD AUTO: 1 % (ref 0–2)
LYMPHOCYTES # BLD AUTO: 1.83 THOUSANDS/ÂΜL (ref 0.6–4.47)
LYMPHOCYTES NFR BLD AUTO: 14 % (ref 14–44)
MAGNESIUM SERPL-MCNC: 1.5 MG/DL (ref 1.6–2.6)
MAGNESIUM SERPL-MCNC: 2.1 MG/DL (ref 1.6–2.6)
MCH RBC QN AUTO: 27.4 PG (ref 26.8–34.3)
MCHC RBC AUTO-ENTMCNC: 32.9 G/DL (ref 31.4–37.4)
MCV RBC AUTO: 83 FL (ref 82–98)
MONOCYTES # BLD AUTO: 0.87 THOUSAND/ÂΜL (ref 0.17–1.22)
MONOCYTES NFR BLD AUTO: 6 % (ref 4–12)
NEUTROPHILS # BLD AUTO: 9.69 THOUSANDS/ÂΜL (ref 1.85–7.62)
NEUTS SEG NFR BLD AUTO: 71 % (ref 43–75)
NRBC BLD AUTO-RTO: 0 /100 WBCS
P AXIS: 36 DEGREES
P AXIS: 69 DEGREES
PLATELET # BLD AUTO: 334 THOUSANDS/UL (ref 149–390)
PMV BLD AUTO: 8.7 FL (ref 8.9–12.7)
POTASSIUM SERPL-SCNC: 2.4 MMOL/L (ref 3.5–5.3)
POTASSIUM SERPL-SCNC: 2.8 MMOL/L (ref 3.5–5.3)
PR INTERVAL: 146 MS
PR INTERVAL: 154 MS
QRS AXIS: 35 DEGREES
QRS AXIS: 52 DEGREES
QRSD INTERVAL: 88 MS
QRSD INTERVAL: 88 MS
QT INTERVAL: 400 MS
QT INTERVAL: 428 MS
QTC INTERVAL: 467 MS
QTC INTERVAL: 490 MS
RBC # BLD AUTO: 3.69 MILLION/UL (ref 3.81–5.12)
SODIUM SERPL-SCNC: 140 MMOL/L (ref 135–147)
SODIUM SERPL-SCNC: 143 MMOL/L (ref 135–147)
T WAVE AXIS: 12 DEGREES
T WAVE AXIS: 140 DEGREES
VENTRICULAR RATE: 79 BPM
VENTRICULAR RATE: 82 BPM
WBC # BLD AUTO: 13.57 THOUSAND/UL (ref 4.31–10.16)

## 2022-10-22 PROCEDURE — 82948 REAGENT STRIP/BLOOD GLUCOSE: CPT

## 2022-10-22 PROCEDURE — 93005 ELECTROCARDIOGRAM TRACING: CPT

## 2022-10-22 PROCEDURE — 96366 THER/PROPH/DIAG IV INF ADDON: CPT

## 2022-10-22 PROCEDURE — 80048 BASIC METABOLIC PNL TOTAL CA: CPT | Performed by: PHYSICIAN ASSISTANT

## 2022-10-22 PROCEDURE — 83735 ASSAY OF MAGNESIUM: CPT | Performed by: PHYSICIAN ASSISTANT

## 2022-10-22 PROCEDURE — 85025 COMPLETE CBC W/AUTO DIFF WBC: CPT | Performed by: PHYSICIAN ASSISTANT

## 2022-10-22 PROCEDURE — 99223 1ST HOSP IP/OBS HIGH 75: CPT | Performed by: INTERNAL MEDICINE

## 2022-10-22 PROCEDURE — 99254 IP/OBS CNSLTJ NEW/EST MOD 60: CPT | Performed by: PODIATRIST

## 2022-10-22 PROCEDURE — 84484 ASSAY OF TROPONIN QUANT: CPT

## 2022-10-22 PROCEDURE — 93010 ELECTROCARDIOGRAM REPORT: CPT | Performed by: STUDENT IN AN ORGANIZED HEALTH CARE EDUCATION/TRAINING PROGRAM

## 2022-10-22 PROCEDURE — 36415 COLL VENOUS BLD VENIPUNCTURE: CPT

## 2022-10-22 RX ORDER — ONDANSETRON 2 MG/ML
4 INJECTION INTRAMUSCULAR; INTRAVENOUS EVERY 6 HOURS PRN
Status: DISCONTINUED | OUTPATIENT
Start: 2022-10-22 | End: 2022-10-22 | Stop reason: SDUPTHER

## 2022-10-22 RX ORDER — MAGNESIUM SULFATE 1 G/100ML
1 INJECTION INTRAVENOUS ONCE
Status: COMPLETED | OUTPATIENT
Start: 2022-10-22 | End: 2022-10-22

## 2022-10-22 RX ORDER — ALPRAZOLAM 0.25 MG/1
0.25 TABLET ORAL DAILY PRN
Status: DISCONTINUED | OUTPATIENT
Start: 2022-10-22 | End: 2022-10-30

## 2022-10-22 RX ORDER — GABAPENTIN 100 MG/1
200 CAPSULE ORAL 2 TIMES DAILY
Status: DISCONTINUED | OUTPATIENT
Start: 2022-10-22 | End: 2022-11-27

## 2022-10-22 RX ORDER — ACETAMINOPHEN 325 MG/1
650 TABLET ORAL EVERY 6 HOURS PRN
Status: DISCONTINUED | OUTPATIENT
Start: 2022-10-22 | End: 2022-11-14

## 2022-10-22 RX ORDER — SODIUM CHLORIDE AND POTASSIUM CHLORIDE 300; 900 MG/100ML; MG/100ML
75 INJECTION, SOLUTION INTRAVENOUS CONTINUOUS
Status: DISPENSED | OUTPATIENT
Start: 2022-10-22 | End: 2022-10-24

## 2022-10-22 RX ORDER — POTASSIUM CHLORIDE 14.9 MG/ML
20 INJECTION INTRAVENOUS ONCE
Status: COMPLETED | OUTPATIENT
Start: 2022-10-22 | End: 2022-10-22

## 2022-10-22 RX ORDER — INSULIN GLARGINE 100 [IU]/ML
60 INJECTION, SOLUTION SUBCUTANEOUS
Status: DISCONTINUED | OUTPATIENT
Start: 2022-10-22 | End: 2022-10-28

## 2022-10-22 RX ORDER — SODIUM CHLORIDE AND POTASSIUM CHLORIDE 300; 900 MG/100ML; MG/100ML
75 INJECTION, SOLUTION INTRAVENOUS CONTINUOUS
Status: DISCONTINUED | OUTPATIENT
Start: 2022-10-22 | End: 2022-10-22

## 2022-10-22 RX ORDER — AMLODIPINE BESYLATE 10 MG/1
10 TABLET ORAL DAILY
Status: DISCONTINUED | OUTPATIENT
Start: 2022-10-22 | End: 2022-10-25

## 2022-10-22 RX ORDER — SODIUM CHLORIDE 9 MG/ML
75 INJECTION, SOLUTION INTRAVENOUS CONTINUOUS
Status: DISCONTINUED | OUTPATIENT
Start: 2022-10-22 | End: 2022-10-22

## 2022-10-22 RX ORDER — SERTRALINE HYDROCHLORIDE 100 MG/1
100 TABLET, FILM COATED ORAL DAILY
Status: DISCONTINUED | OUTPATIENT
Start: 2022-10-22 | End: 2022-11-10

## 2022-10-22 RX ORDER — POTASSIUM CHLORIDE 20 MEQ/1
40 TABLET, EXTENDED RELEASE ORAL ONCE
Status: COMPLETED | OUTPATIENT
Start: 2022-10-22 | End: 2022-10-22

## 2022-10-22 RX ORDER — HEPARIN SODIUM 5000 [USP'U]/ML
5000 INJECTION, SOLUTION INTRAVENOUS; SUBCUTANEOUS EVERY 8 HOURS SCHEDULED
Status: DISCONTINUED | OUTPATIENT
Start: 2022-10-22 | End: 2022-11-02

## 2022-10-22 RX ORDER — METOPROLOL SUCCINATE 25 MG/1
25 TABLET, EXTENDED RELEASE ORAL DAILY
Status: DISCONTINUED | OUTPATIENT
Start: 2022-10-22 | End: 2022-10-22 | Stop reason: SDUPTHER

## 2022-10-22 RX ORDER — ASPIRIN 81 MG/1
81 TABLET, CHEWABLE ORAL DAILY
Status: DISCONTINUED | OUTPATIENT
Start: 2022-10-22 | End: 2022-11-07

## 2022-10-22 RX ORDER — ONDANSETRON 2 MG/ML
4 INJECTION INTRAMUSCULAR; INTRAVENOUS EVERY 6 HOURS PRN
Status: DISCONTINUED | OUTPATIENT
Start: 2022-10-22 | End: 2023-01-12 | Stop reason: HOSPADM

## 2022-10-22 RX ORDER — HYDRALAZINE HYDROCHLORIDE 20 MG/ML
10 INJECTION INTRAMUSCULAR; INTRAVENOUS ONCE
Status: DISCONTINUED | OUTPATIENT
Start: 2022-10-22 | End: 2022-10-22

## 2022-10-22 RX ORDER — LISINOPRIL 20 MG/1
40 TABLET ORAL DAILY
Status: DISCONTINUED | OUTPATIENT
Start: 2022-10-22 | End: 2022-10-24

## 2022-10-22 RX ORDER — METOCLOPRAMIDE HYDROCHLORIDE 5 MG/ML
5 INJECTION INTRAMUSCULAR; INTRAVENOUS EVERY 6 HOURS PRN
Status: DISCONTINUED | OUTPATIENT
Start: 2022-10-22 | End: 2022-11-14

## 2022-10-22 RX ORDER — INSULIN LISPRO 100 [IU]/ML
1-6 INJECTION, SOLUTION INTRAVENOUS; SUBCUTANEOUS
Status: DISCONTINUED | OUTPATIENT
Start: 2022-10-22 | End: 2022-11-07

## 2022-10-22 RX ORDER — CARVEDILOL 12.5 MG/1
12.5 TABLET ORAL 2 TIMES DAILY WITH MEALS
Status: DISCONTINUED | OUTPATIENT
Start: 2022-10-22 | End: 2022-10-25

## 2022-10-22 RX ORDER — MORPHINE SULFATE 4 MG/ML
4 INJECTION, SOLUTION INTRAMUSCULAR; INTRAVENOUS ONCE
Status: COMPLETED | OUTPATIENT
Start: 2022-10-22 | End: 2022-10-22

## 2022-10-22 RX ORDER — MORPHINE SULFATE 4 MG/ML
4 INJECTION, SOLUTION INTRAMUSCULAR; INTRAVENOUS EVERY 4 HOURS PRN
Status: DISCONTINUED | OUTPATIENT
Start: 2022-10-22 | End: 2022-10-23

## 2022-10-22 RX ORDER — POTASSIUM CHLORIDE 14.9 MG/ML
20 INJECTION INTRAVENOUS ONCE
Status: DISCONTINUED | OUTPATIENT
Start: 2022-10-22 | End: 2022-10-28

## 2022-10-22 RX ORDER — HYDRALAZINE HYDROCHLORIDE 20 MG/ML
10 INJECTION INTRAMUSCULAR; INTRAVENOUS EVERY 6 HOURS PRN
Status: DISCONTINUED | OUTPATIENT
Start: 2022-10-22 | End: 2022-11-14

## 2022-10-22 RX ORDER — POTASSIUM CHLORIDE 20 MEQ/1
40 TABLET, EXTENDED RELEASE ORAL ONCE
Status: DISCONTINUED | OUTPATIENT
Start: 2022-10-22 | End: 2022-10-22

## 2022-10-22 RX ORDER — HYDRALAZINE HYDROCHLORIDE 20 MG/ML
15 INJECTION INTRAMUSCULAR; INTRAVENOUS ONCE
Status: COMPLETED | OUTPATIENT
Start: 2022-10-22 | End: 2022-10-22

## 2022-10-22 RX ORDER — HYDRALAZINE HYDROCHLORIDE 20 MG/ML
5 INJECTION INTRAMUSCULAR; INTRAVENOUS ONCE
Status: COMPLETED | OUTPATIENT
Start: 2022-10-22 | End: 2022-10-22

## 2022-10-22 RX ADMIN — PIPERACILLIN AND TAZOBACTAM 3.38 G: 3; .375 INJECTION, POWDER, LYOPHILIZED, FOR SOLUTION INTRAVENOUS at 17:00

## 2022-10-22 RX ADMIN — ONDANSETRON 4 MG: 2 INJECTION INTRAMUSCULAR; INTRAVENOUS at 22:41

## 2022-10-22 RX ADMIN — LISINOPRIL 40 MG: 20 TABLET ORAL at 09:46

## 2022-10-22 RX ADMIN — HYDRALAZINE HYDROCHLORIDE 15 MG: 20 INJECTION, SOLUTION INTRAMUSCULAR; INTRAVENOUS at 05:29

## 2022-10-22 RX ADMIN — HYDRALAZINE HYDROCHLORIDE 10 MG: 20 INJECTION, SOLUTION INTRAMUSCULAR; INTRAVENOUS at 17:00

## 2022-10-22 RX ADMIN — MORPHINE SULFATE 4 MG: 4 INJECTION INTRAVENOUS at 18:20

## 2022-10-22 RX ADMIN — SERTRALINE HYDROCHLORIDE 100 MG: 100 TABLET ORAL at 09:46

## 2022-10-22 RX ADMIN — METOCLOPRAMIDE 5 MG: 5 INJECTION, SOLUTION INTRAMUSCULAR; INTRAVENOUS at 16:11

## 2022-10-22 RX ADMIN — PIPERACILLIN AND TAZOBACTAM 3.38 G: 3; .375 INJECTION, POWDER, LYOPHILIZED, FOR SOLUTION INTRAVENOUS at 22:27

## 2022-10-22 RX ADMIN — METOPROLOL SUCCINATE 25 MG: 25 TABLET, EXTENDED RELEASE ORAL at 09:46

## 2022-10-22 RX ADMIN — POTASSIUM CHLORIDE AND SODIUM CHLORIDE 75 ML/HR: 900; 300 INJECTION, SOLUTION INTRAVENOUS at 17:50

## 2022-10-22 RX ADMIN — ONDANSETRON 4 MG: 2 INJECTION INTRAMUSCULAR; INTRAVENOUS at 14:14

## 2022-10-22 RX ADMIN — MORPHINE SULFATE 4 MG: 4 INJECTION INTRAVENOUS at 03:04

## 2022-10-22 RX ADMIN — MAGNESIUM SULFATE HEPTAHYDRATE 1 G: 1 INJECTION, SOLUTION INTRAVENOUS at 05:03

## 2022-10-22 RX ADMIN — INSULIN GLARGINE 60 UNITS: 100 INJECTION, SOLUTION SUBCUTANEOUS at 22:27

## 2022-10-22 RX ADMIN — INSULIN LISPRO 1 UNITS: 100 INJECTION, SOLUTION INTRAVENOUS; SUBCUTANEOUS at 22:27

## 2022-10-22 RX ADMIN — POTASSIUM CHLORIDE 20 MEQ: 14.9 INJECTION, SOLUTION INTRAVENOUS at 08:14

## 2022-10-22 RX ADMIN — GABAPENTIN 200 MG: 100 CAPSULE ORAL at 17:02

## 2022-10-22 RX ADMIN — HYDRALAZINE HYDROCHLORIDE 5 MG: 20 INJECTION INTRAMUSCULAR; INTRAVENOUS at 02:24

## 2022-10-22 RX ADMIN — MAGNESIUM SULFATE HEPTAHYDRATE 1 G: 1 INJECTION, SOLUTION INTRAVENOUS at 06:17

## 2022-10-22 RX ADMIN — INSULIN LISPRO 2 UNITS: 100 INJECTION, SOLUTION INTRAVENOUS; SUBCUTANEOUS at 16:12

## 2022-10-22 RX ADMIN — PIPERACILLIN AND TAZOBACTAM 3.38 G: 3; .375 INJECTION, POWDER, LYOPHILIZED, FOR SOLUTION INTRAVENOUS at 11:01

## 2022-10-22 RX ADMIN — GABAPENTIN 200 MG: 100 CAPSULE ORAL at 09:46

## 2022-10-22 RX ADMIN — MORPHINE SULFATE 4 MG: 4 INJECTION INTRAVENOUS at 06:31

## 2022-10-22 RX ADMIN — CARVEDILOL 12.5 MG: 12.5 TABLET, FILM COATED ORAL at 17:50

## 2022-10-22 RX ADMIN — ONDANSETRON 4 MG: 2 INJECTION INTRAMUSCULAR; INTRAVENOUS at 06:20

## 2022-10-22 RX ADMIN — HEPARIN SODIUM 5000 UNITS: 5000 INJECTION INTRAVENOUS; SUBCUTANEOUS at 14:17

## 2022-10-22 RX ADMIN — ASPIRIN 81 MG CHEWABLE TABLET 81 MG: 81 TABLET CHEWABLE at 09:45

## 2022-10-22 RX ADMIN — AMLODIPINE BESYLATE 10 MG: 10 TABLET ORAL at 09:46

## 2022-10-22 RX ADMIN — MORPHINE SULFATE 4 MG: 4 INJECTION INTRAVENOUS at 11:01

## 2022-10-22 RX ADMIN — SODIUM CHLORIDE 75 ML/HR: 0.9 INJECTION, SOLUTION INTRAVENOUS at 09:44

## 2022-10-22 RX ADMIN — INSULIN LISPRO 5 UNITS: 100 INJECTION, SOLUTION INTRAVENOUS; SUBCUTANEOUS at 11:05

## 2022-10-22 RX ADMIN — POTASSIUM CHLORIDE 40 MEQ: 1500 TABLET, EXTENDED RELEASE ORAL at 04:58

## 2022-10-22 RX ADMIN — HEPARIN SODIUM 5000 UNITS: 5000 INJECTION INTRAVENOUS; SUBCUTANEOUS at 22:27

## 2022-10-22 NOTE — ED NOTES
Per KELLY Wiseman, give 1g Magnesium first before starting potassium      Jigar Washington, EDGAR  10/22/22 [id number]

## 2022-10-22 NOTE — ASSESSMENT & PLAN NOTE
W/o cp  W/ischemic changes in inferolateral leads but stable across multiple ekgs  Given no cp less likely angina but may be related to htn urgency  Monitor on tele  bp control

## 2022-10-22 NOTE — ASSESSMENT & PLAN NOTE
Eschar of left foot which is boggy and weeping    Ct scan concerning for soft tissue edema and sirs vs sepsis  rec'd Vanc/zosyn IV will continue w/zosyn for now

## 2022-10-22 NOTE — ASSESSMENT & PLAN NOTE
W/o SI HI   contributign to poor wound care and overall health care  Continue zoloft, start xanax prn anxiety

## 2022-10-22 NOTE — PLAN OF CARE
Problem: Potential for Falls  Goal: Patient will remain free of falls  Description: INTERVENTIONS:  - Educate patient/family on patient safety including physical limitations  - Instruct patient to call for assistance with activity   - Consult OT/PT to assist with strengthening/mobility   - Keep Call bell within reach  - Keep bed low and locked with side rails adjusted as appropriate  - Keep care items and personal belongings within reach  - Initiate and maintain comfort rounds  - Make Fall Risk Sign visible to staff  - Offer Toileting every  Hours, in advance of need  - Initiate/Maintain  - Obtain necessary fall risk management equipment:  - Apply yellow socks and bracelet for high fall risk patients  - Consider moving patient to room near nurses station  Outcome: Progressing     Problem: MOBILITY - ADULT  Goal: Maintain or return to baseline ADL function  Description: INTERVENTIONS:  -  Assess patient's ability to carry out ADLs; assess patient's baseline for ADL function and identify physical deficits which impact ability to perform ADLs (bathing, care of mouth/teeth, toileting, grooming, dressing, etc )  - Assess/evaluate cause of self-care deficits   - Assess range of motion  - Assess patient's mobility; develop plan if impaired  - Assess patient's need for assistive devices and provide as appropriate  - Encourage maximum independence but intervene and supervise when necessary  - Involve family in performance of ADLs  - Assess for home care needs following discharge   - Consider OT consult to assist with ADL evaluation and planning for discharge  - Provide patient education as appropriate  Outcome: Progressing  Goal: Maintains/Returns to pre admission functional level  Description: INTERVENTIONS:  - Perform BMAT or MOVE assessment daily    - Set and communicate daily mobility goal to care team and patient/family/caregiver     - Collaborate with rehabilitation services on mobility goals if consulted  - Perform Range of Motion imes a day  - Reposition patient everyhours    - Dangle patienttimes a day  - Stand patient imes a day  - Ambulate patient times a day  - Out of bed to chairimes a day   - Out of bed for meals times a day  - Out of bed for toileting  - Record patient progress and toleration of activity level   Outcome: Progressing     Problem: PAIN - ADULT  Goal: Verbalizes/displays adequate comfort level or baseline comfort level  Description: Interventions:  - Encourage patient to monitor pain and request assistance  - Assess pain using appropriate pain scale  - Administer analgesics based on type and severity of pain and evaluate response  - Implement non-pharmacological measures as appropriate and evaluate response  - Consider cultural and social influences on pain and pain management  - Notify physician/advanced practitioner if interventions unsuccessful or patient reports new pain  Outcome: Progressing     Problem: INFECTION - ADULT  Goal: Absence or prevention of progression during hospitalization  Description: INTERVENTIONS:  - Assess and monitor for signs and symptoms of infection  - Monitor lab/diagnostic results  - Monitor all insertion sites, i e  indwelling lines, tubes, and drains  - Monitor endotracheal if appropriate and nasal secretions for changes in amount and color  - [place] appropriate cooling/warming therapies per order  - Administer medications as ordered  - Instruct and encourage patient and family to use good hand hygiene technique  - Identify and instruct in appropriate isolation precautions for identified infection/condition  Outcome: Progressing  Goal: Absence of fever/infection during neutropenic period  Description: INTERVENTIONS:  - Monitor WBC    Outcome: Progressing     Problem: SAFETY ADULT  Goal: Patient will remain free of falls  Description: INTERVENTIONS:  - Educate patient/family on patient safety including physical limitations  - Instruct patient to call for assistance with activity   - Consult OT/PT to assist with strengthening/mobility   - Keep Call bell within reach  - Keep bed low and locked with side rails adjusted as appropriate  - Keep care items and personal belongings within reach  - Initiate and maintain comfort rounds  - Make Fall Risk Sign visible to staff  - Offer Toileting every  Hours, in advance of need  - Initiate/Maintain alarm  - Obtain necessary fall risk management equipment:  - Apply yellow socks and bracelet for high fall risk patients  - Consider moving patient to room near nurses station  Outcome: Progressing  Goal: Maintain or return to baseline ADL function  Description: INTERVENTIONS:  -  Assess patient's ability to carry out ADLs; assess patient's baseline for ADL function and identify physical deficits which impact ability to perform ADLs (bathing, care of mouth/teeth, toileting, grooming, dressing, etc )  - Assess/evaluate cause of self-care deficits   - Assess range of motion  - Assess patient's mobility; develop plan if impaired  - Assess patient's need for assistive devices and provide as appropriate  - Encourage maximum independence but intervene and supervise when necessary  - Involve family in performance of ADLs  - Assess for home care needs following discharge   - Consider OT consult to assist with ADL evaluation and planning for discharge  - Provide patient education as appropriate  Outcome: Progressing  Goal: Maintains/Returns to pre admission functional level  Description: INTERVENTIONS:  - Perform BMAT or MOVE assessment daily    - Set and communicate daily mobility goal to care team and patient/family/caregiver  - Collaborate with rehabilitation services on mobility goals if consulted  - Perform Range of Motion times a day  - Reposition patient every  hours    - Dangle patient times a day  - Stand patient  times a day  - Ambulate patient times a day  - Out of bed to chair  times a day   - Out of bed for mealstimes a day  - Out of bed for toileting  - Record patient progress and toleration of activity level   Outcome: Progressing     Problem: Knowledge Deficit  Goal: Patient/family/caregiver demonstrates understanding of disease process, treatment plan, medications, and discharge instructions  Description: Complete learning assessment and assess knowledge base    Interventions:  - Provide teaching at level of understanding  - Provide teaching via preferred learning methods  Outcome: Progressing     Problem: METABOLIC, FLUID AND ELECTROLYTES - ADULT  Goal: Electrolytes maintained within normal limits  Description: INTERVENTIONS:  - Monitor labs and assess patient for signs and symptoms of electrolyte imbalances  - Administer electrolyte replacement as ordered  - Monitor response to electrolyte replacements, including repeat lab results as appropriate  - Instruct patient on fluid and nutrition as appropriate  Outcome: Progressing  Goal: Fluid balance maintained  Description: INTERVENTIONS:  - Monitor labs   - Monitor I/O and WT  - Instruct patient on fluid and nutrition as appropriate  - Assess for signs & symptoms of volume excess or deficit  Outcome: Progressing  Goal: Glucose maintained within target range  Description: INTERVENTIONS:  - Monitor Blood Glucose as ordered  - Assess for signs and symptoms of hyperglycemia and hypoglycemia  - Administer ordered medications to maintain glucose within target range  - Assess nutritional intake and initiate nutrition service referral as needed  Outcome: Progressing     Problem: SKIN/TISSUE INTEGRITY - ADULT  Goal: Skin Integrity remains intact(Skin Breakdown Prevention)  Description: Assess:  -Perform [place] assessment isabelle  -Clean and moisturize skin every   -Inspect skin when repositioning, toileting, and assisting with ADLS  -Assess under medical devices such as very  -Assess extremities for adequate circulation and sensation     Bed Management:  -Have minimal linens on bed & keep smooth, unwrinkled  -Change linens as needed when moist or perspiring  -Avoid sitting or lying in one position for more than *ours while in bed  -Keep HOB at egrees     Toileting:  -Offer bedside commode  -Assess for incontinence every  -Use incontinent care products after each incontinent episode such as     Activity:  -Mobilize patient times a day  -Encourage activity and walks on unit  -Encourage or provide ROM exercises   -Turn and reposition patient every Hours  -Use appropriate equipment to lift or move patient in bed  -Instruct/ Assist with weight shifting every when out of bed in chair  -Consider limitation of chair time hour intervals    Skin Care:  -Avoid use of baby powder, tape, friction and shearing, hot water or constrictive clothing  -Relieve pressure over bony prominences using   -Do not massage red bony areas    Next Steps:  -Teach patient strategies to minimize risks such as   -Consider consults to  interdisciplinary teams such as  Outcome: Progressing  Goal: Incision(s), wounds(s) or drain site(s) healing without S/S of infection  Description: INTERVENTIONS  - Assess and document dressing, incision, wound bed, drain sites and surrounding tissue  - Provide patient and family education  - Perform skin care/dressing changes every  Outcome: Progressing  Goal: Pressure injury heals and does not worsen  Description: Interventions:  - Implement low air loss mattress or specialty surface (Criteria met)  - Apply silicone foam dressing  - Instruct/assist with weight shifting every minutes when in chair   - Limit chair time to  hour intervals  - Use special pressure reducing interventions such as  when in chair   - Apply fecal or urinary incontinence containment device   - Perform passive or active ROM every  - Turn and reposition patient & offload bony prominences every hours   - Utilize friction reducing device or surface for transfers   - Consider consults to  interdisciplinary teams such as   - Use incontinent care products after each incontinent episode such as  - Consider nutrition services referral as needed  Outcome: Progressing     Problem: MUSCULOSKELETAL - ADULT  Goal: Maintain or return mobility to safest level of function  Description: INTERVENTIONS:  - Assess patient's ability to carry out ADLs; assess patient's baseline for ADL function and identify physical deficits which impact ability to perform ADLs (bathing, care of mouth/teeth, toileting, grooming, dressing, etc )  - Assess/evaluate cause of self-care deficits   - Assess range of motion  - Assess patient's mobility  - Assess patient's need for assistive devices and provide as appropriate  - Encourage maximum independence but intervene and supervise when necessary  - Involve family in performance of ADLs  - Assess for home care needs following discharge   - Consider OT consult to assist with ADL evaluation and planning for discharge  - Provide patient education as appropriate  Outcome: Progressing  Goal: Maintain proper alignment of affected body part  Description: INTERVENTIONS:  - Support, maintain and protect limb and body alignment  - Provide patient/ family with appropriate education  Outcome: Progressing

## 2022-10-22 NOTE — ED PROVIDER NOTES
History  Chief Complaint   Patient presents with   • Wound Check     Referred to ed by pcp for wound on left heel  Pt reports vomiting  Denies fever  Hx of diabetes  Not checking blood glucose at home  Not taking medications as prescribed  Pt presenting to the ED with concern for b/l foot wounds, L worse than R- sent by her PCP for further evaluation  States she had a small area of skin disruption on the L heel about 8 months ago, was seen by podiatry who initially diagnosed it as a fissure  States she was caring for it initially but then it started to get better so she stopped  Reports over the last few months the wound over th L heal has continued to worsen and become increasingly painful, now necrotic with 2 smaller necrotic lesions over the medial aspect of the L shin  Also reports a black R 5th digit  Reports she does have T2DM on insulin however has not been checking her glucose regularly and has only been using her at home insulin intermittently 2/2 to financial issues  Reports she has had dyspnea with exertion over the last few months which she attributes to being sedentary 2/2 to having a hard time getting around due to the b/l foot pain  Denies associated CP, palpitations, diaphoresis, SOB at rest, pleuritic CP and lightheadedness  Reports her LLE is intermittently swollen but seems to improve with elevation  Denies fever and chills  Reports she did have nausea and vomiting last week but no N/V over the last few days  (+) Dysuria and increased urinary frequency over the last few weeks with azo taken without significant relief  Reports it feels like previous UTIs  No hematuria, malodorous urine, vaginal complaints, flank pain, or back pain  Denies cough, congestion, abdominal pain, N/V/D, HA, weakness or any other complaint  Has been eating and drinking normaly  No other complaints today  No history of MRSA infection and no recent abx usage  Denies alcohole and illicit drug use             Prior to Admission Medications   Prescriptions Last Dose Informant Patient Reported? Taking?    ALPRAZolam (XANAX) 0 25 mg tablet   No Yes   Sig: Take 1 tablet (0 25 mg total) by mouth daily as needed for anxiety (panic attacks)   Cholecalciferol (Vitamin D-3) 25 MCG (1000 UT) CAPS Not Taking at Unknown time  Yes No   Sig: Take 2,000 Units by mouth daily   Patient not taking: Reported on 10/21/2022   Continuous Blood Gluc  (FreeStyle Noé 14 Day [place]) Sandra Martini   No No   Sig: Use 1 Units continuous   Patient not taking: Reported on 10/21/2022   Continuous Blood Gluc Sensor (FreeStyle Noé 14 Day Sensor) MISC Not Taking at Unknown time  No No   Sig: Use daily as directed for CGM - Change every 14 days   Patient not taking: No sig reported   Doxylamine Succinate, Sleep, (UNISOM PO) Not Taking at Unknown time  Yes No   Sig: Take by mouth   Patient not taking: No sig reported   Insulin Pen Needle (B-D UF III MINI PEN NEEDLES) 31G X 5 MM MISC   No No   Sig: USE WITH INSULIN FOUR TIMES DAILY   Lancets 28G MISC Not Taking at Unknown time  No No   Sig: Use 1 each 4 (four) times a day   Patient not taking: No sig reported   Multiple Vitamin (multivitamin) tablet   Yes Yes   Sig: Take 1 tablet by mouth daily   Trulicity 3 43 ZV/2 9QN SOPN   No Yes   Sig: INJECT 0 5 ML (0 75 MG TOTAL) UNDER THE SKIN ONCE A WEEK TUESDAY   amLODIPine (NORVASC) 10 mg tablet   No Yes   Sig: TAKE 1 TABLET BY MOUTH EVERY DAY   aspirin 81 mg chewable tablet  Self Yes Yes   Sig: Chew 81 mg   atorvastatin (LIPITOR) 40 mg tablet   No Yes   Sig: TAKE 1 TABLET BY MOUTH EVERY DAY   clotrimazole-betamethasone (LOTRISONE) 1-0 05 % cream   No Yes   Sig: Apply topically 2 (two) times a day   fluocinonide (LIDEX) 0 05 % ointment   No Yes   Sig: Apply topically 2 (two) times a day   gabapentin (NEURONTIN) 100 mg capsule   No Yes   Sig: TAKE 2 CAPSULES BY MOUTH TWICE A DAY   glucose blood (True Metrix Blood Glucose Test) test strip Not Taking at Unknown time  No No Sig: Use 1 each 4 (four) times a day   Patient not taking: No sig reported   insulin aspart (NovoLOG FlexPen) 100 UNIT/ML injection pen   No Yes   Sig: Inject 20 Units under the skin 3 (three) times a day with meals   insulin glargine (Lantus SoloStar) 100 units/mL injection pen   No Yes   Sig: Inject 60 Units under the skin daily at bedtime   lisinopril (ZESTRIL) 40 mg tablet   No Yes   Sig: TAKE 1 TABLET BY MOUTH EVERY DAY   metoprolol succinate (TOPROL-XL) 25 mg 24 hr tablet   No Yes   Sig: TAKE 1 TABLET (25 MG TOTAL) BY MOUTH DAILY  pantoprazole (PROTONIX) 40 mg tablet   No No   Sig: Take 1 tablet (40 mg total) by mouth daily for 14 days   potassium chloride (K-DUR,KLOR-CON) 10 mEq tablet   No No   Sig: Take 2 tablets (20 mEq total) by mouth 2 (two) times a day for 2 doses   sertraline (ZOLOFT) 100 mg tablet   No Yes   Sig: TAKE 1 TABLET BY MOUTH EVERY DAY      Facility-Administered Medications: None       Past Medical History:   Diagnosis Date   • Anxiety    • Depression    • Diabetes ([facility] )    • Diabetes mellitus ([facility] )    • Esophageal reflux     28 APR 2015 RESOLVED   • Hyperlipidemia    • Hypertension    • Low back pain     sciatica   • Pneumonia 2019   • Stroke ([facility] ) 12/2015    small vessel, L frontal corona radiata  Rx asa 81, Lipitor 40, risk modification   • Vitamin D deficiency        Past Surgical History:   Procedure Laterality Date   • COLONOSCOPY         Family History   Problem Relation Age of Onset   • Diabetes Mother    • Lung cancer Mother    • Cancer Father    • Lung cancer Father    • Hypertension Brother    • Hypertension Family      I have reviewed and agree with the history as documented      E-Cigarette/Vaping   • E-Cigarette Use Never User      E-Cigarette/Vaping Substances   • Nicotine No    • THC No    • CBD No    • Flavoring No    • Other No    • Unknown No      Social History     Tobacco Use   • Smoking status: Former Smoker     Types: Cigarettes     Quit date: 2001     Years since quittin 8   • Smokeless tobacco: Never Used   Vaping Use   • Vaping Use: Never used   Substance Use Topics   • Alcohol use: No     Comment: OCCASIONAL ALCOHOL USE IN ALL SCRIPTS   • Drug use: No       Review of Systems   Constitutional: Negative for chills and fever  HENT: Negative for ear pain and sore throat  Eyes: Negative for pain and visual disturbance  Respiratory: Negative for cough and shortness of breath  OREILLY   Cardiovascular: Negative for chest pain and palpitations  Gastrointestinal: Negative for abdominal pain, diarrhea, nausea and vomiting  Genitourinary: Positive for dysuria and frequency  Negative for difficulty urinating, hematuria, urgency, vaginal bleeding, vaginal discharge and vaginal pain  Musculoskeletal: Negative for arthralgias, back pain and neck pain  Necrotic would over L heel, L shin and R 5th digit  Skin: Positive for wound  Negative for color change and rash  Neurological: Negative for seizures, syncope, weakness, light-headedness and headaches  All other systems reviewed and are negative  Physical Exam  Physical Exam  Vitals and nursing note reviewed  Constitutional:       General: She is awake  She is not in acute distress  Appearance: She is well-developed  She is obese  She is not ill-appearing or toxic-appearing  HENT:      Head: Normocephalic and atraumatic  Mouth/Throat:      Mouth: Mucous membranes are moist       Pharynx: Oropharynx is clear  Eyes:      Conjunctiva/sclera: Conjunctivae normal    Cardiovascular:      Rate and Rhythm: Normal rate and regular rhythm  Heart sounds: No murmur heard  Pulmonary:      Effort: Pulmonary effort is normal  No respiratory distress  Breath sounds: Normal breath sounds  Comments: Clear breath sounds auscultated throughout all lung fields bilaterally  Adequate air movement  No respiratory distress  O2 sat- 98 % on RA       Chest:      Chest wall: No tenderness or edema  Abdominal:      Palpations: Abdomen is soft  Tenderness: There is no abdominal tenderness  Comments: Soft and non-tender  Genitourinary:     Comments: Deferred  Musculoskeletal:      Cervical back: Neck supple  Comments: Large necrotic wound over left heel  No significant edema, purulent d/c, proximal streaking erythema, crepitus or pain out of proportion  Two small, circular eschar wounds over left medial shin  Intact with no drainage, surrounding erythema or swelling however they are TTP  Necrotic right small toe with TTP  Small area of petechial rash over dorsal aspect of R foot proximal to the R 5th digit  B/L feet are both severely dry and scaling  L and R DP and PT pulses easily found w/ doppler  Sensation and motor intact in b/l LE  Strength 5/5 in b/l LE  Lymphadenopathy:      Cervical: No cervical adenopathy  Skin:     General: Skin is warm and dry  Capillary Refill: Capillary refill takes 2 to 3 seconds  Neurological:      General: No focal deficit present  Mental Status: She is alert  Psychiatric:         Behavior: Behavior is cooperative                                Vital Signs  ED Triage Vitals   Temperature Pulse Respirations Blood Pressure SpO2   10/21/22 1629 10/21/22 1628 10/21/22 1628 10/21/22 1628 10/21/22 1628   98 3 °F (36 8 °C) 93 18 (!) 238/115 98 %      Temp Source Heart Rate Source Patient Position - Orthostatic VS BP Location FiO2 (%)   10/21/22 1629 10/21/22 1628 10/21/22 1628 10/21/22 1628 --   Oral Monitor Sitting Right arm       Pain Score       10/21/22 1629       4           Vitals:    10/22/22 0100 10/22/22 0130 10/22/22 0230 10/22/22 0345   BP: (!) 215/93 (!) 226/95 (!) 222/93 (!) 198/87   Pulse: 76 82 80 80   Patient Position - Orthostatic VS: Lying Lying Lying Lying         Visual Acuity      ED Medications  Medications   metoprolol succinate (TOPROL-XL) 24 hr tablet 25 mg (has no administration in time range)   sodium chloride 0 9 % bolus 500 mL (0 mL Intravenous Stopped 10/21/22 2327)   acetaminophen (TYLENOL) tablet 650 mg (650 mg Oral Given 10/21/22 2123)   vancomycin (VANCOCIN) 1,750 mg in sodium chloride 0 9 % 500 mL IVPB (0 mg/kg × 71 9 kg (Adjusted) Intravenous Stopped 10/22/22 0135)   piperacillin-tazobactam (ZOSYN) 4 5 g in sodium chloride 0 9 % 100 mL IVPB (0 g Intravenous Stopped 10/21/22 2319)   potassium chloride (K-DUR,KLOR-CON) CR tablet 40 mEq (40 mEq Oral Given 10/21/22 2231)   potassium chloride 20 mEq IVPB (premix) (0 mEq Intravenous Stopped 10/22/22 0135)   morphine injection 4 mg (4 mg Intravenous Given 10/21/22 2250)   iohexol (OMNIPAQUE) 350 MG/ML injection (SINGLE-DOSE) 100 mL (100 mL Intravenous Given 10/21/22 2303)   sodium chloride 0 9 % bolus 250 mL (0 mL Intravenous Stopped 10/22/22 0135)   lisinopril (ZESTRIL) tablet 40 mg (40 mg Oral Given 10/21/22 2358)   amLODIPine (NORVASC) tablet 10 mg (10 mg Oral Given 10/21/22 2358)   hydrALAZINE (APRESOLINE) injection 5 mg (5 mg Intravenous Given 10/22/22 0224)   morphine injection 4 mg (4 mg Intravenous Given 10/22/22 0304)       Diagnostic Studies  Results Reviewed     Procedure Component Value Units Date/Time    HS Troponin I 4hr [[id number]]  (Normal) Collected: 10/22/22 0105    Lab Status: Final result Specimen: Blood from Arm, Right Updated: 10/22/22 0148     hs TnI 4hr 39 ng/L      Delta 4hr hsTnI -3 ng/L     Blood culture #1 [[id number]] Collected: 10/21/22 2107    Lab Status: Preliminary result Specimen: Blood from Hand, Right Updated: 10/22/22 0003     Blood Culture Received in Microbiology Lab  Culture in Progress  Blood culture #2 [[id number]] Collected: 10/21/22 2108    Lab Status: Preliminary result Specimen: Blood from Hand, Left Updated: 10/22/22 0003     Blood Culture Received in Microbiology Lab  Culture in Progress      HS Troponin I 2hr [[id number]]  (Normal) Collected: 10/21/22 3845    Lab Status: Final result Specimen: Blood from Arm, Right Updated: 10/21/22 2351     hs TnI 2hr 42 ng/L      Delta 2hr hsTnI 0 ng/L     Comprehensive metabolic panel [[id number]]  (Abnormal) Collected: 10/21/22 2108    Lab Status: Final result Specimen: Blood from Arm, Left Updated: 10/21/22 2205     Sodium 143 mmol/L      Potassium 2 4 mmol/L      Chloride 102 mmol/L      CO2 31 mmol/L      ANION GAP 10 mmol/L      BUN 16 mg/dL      Creatinine 1 10 mg/dL      Glucose 176 mg/dL      Calcium 8 6 mg/dL      Corrected Calcium 10 0 mg/dL      AST 15 U/L      ALT 15 U/L      Alkaline Phosphatase 104 U/L      Total Protein 6 6 g/dL      Albumin 2 3 g/dL      Total Bilirubin 0 19 mg/dL      eGFR 54 ml/min/1 73sq m     Narrative:      [facility] guidelines for Chronic Kidney Disease (CKD):   •  Stage 1 with normal or high GFR (GFR > 90 mL/min/1 73 square meters)  •  Stage 2 Mild CKD (GFR = 60-89 mL/min/1 73 square meters)  •  Stage 3A Moderate CKD (GFR = 45-59 mL/min/1 73 square meters)  •  Stage 3B Moderate CKD (GFR = 30-44 mL/min/1 73 square meters)  •  Stage 4 Severe CKD (GFR = 15-29 mL/min/1 73 square meters)  •  Stage 5 End Stage CKD (GFR <15 mL/min/1 73 square meters)  Note: GFR calculation is accurate only with a steady state creatinine    FLU/RSV/COVID - if FLU/RSV clinically relevant [[id number]]  (Normal) Collected: 10/21/22 2108    Lab Status: Final result Specimen: Nares from Nasopharyngeal Swab Updated: 10/21/22 2201     SARS-CoV-2 Negative     INFLUENZA A PCR Negative     INFLUENZA B PCR Negative     RSV PCR Negative    Narrative:      FOR PEDIATRIC PATIENTS - copy/paste COVID Guidelines URL to browser: [email or web address]    SARS-CoV-2 assay is a Nucleic Acid Amplification assay intended for the  qualitative detection of nucleic acid from SARS-CoV-2 in nasopharyngeal  swabs  Results are for the presumptive identification of SARS-CoV-2 RNA      Positive results are indicative of infection with SARS-CoV-2, the virus  causing COVID-19, but do not rule out bacterial infection or co-infection  with other viruses  Laboratories within the [place] and its  territories are required to report all positive results to the appropriate  public health authorities  Negative results do not preclude SARS-CoV-2  infection and should not be used as the sole basis for treatment or other  patient management decisions  Negative results must be combined with  clinical observations, patient history, and epidemiological information  This test has not been FDA cleared or approved  This test has been authorized by FDA under an Emergency Use Authorization  (EUA)  This test is only authorized for the duration of time the  declaration that circumstances exist justifying the authorization of the  emergency use of an in vitro diagnostic tests for detection of SARS-CoV-2  virus and/or diagnosis of COVID-19 infection under section 564(b)(1) of  the Act, 21 U  S C  122YTE-6(H)(6), unless the authorization is terminated  or revoked sooner  The test has been validated but independent review by FDA  and CLIA is pending  Test performed using [employer] GeneXpert: This RT-PCR assay targets N2,  a region unique to SARS-CoV-2  A conserved region in the E-gene was chosen  for pan-Sarbecovirus detection which includes SARS-CoV-2  According to CMS-2020-01-R, this platform meets the definition of high-throughput technology  Urine Microscopic [[id number]]  (Abnormal) Collected: 10/21/22 2128    Lab Status: Final result Specimen: Urine, Other Updated: 10/21/22 2201     RBC, UA Innumerable /hpf      WBC, UA Innumerable /hpf      Epithelial Cells Moderate /hpf      Bacteria, UA Innumerable /hpf     Urine culture [[id number]] Collected: 10/21/22 2128    Lab Status:  In process Specimen: Urine, Other Updated: 10/21/22 2200    NT-BNP PRO [[id number]]  (Abnormal) Collected: 10/21/22 2108    Lab Status: Final result Specimen: Blood from Arm, Left Updated: 10/21/22 2151     NT-proBNP 2,405 pg/mL     Magnesium [[id number]]  (Normal) Collected: 10/21/22 2108    Lab Status: Final result Specimen: Blood from Arm, Left Updated: 10/21/22 2151     Magnesium 1 6 mg/dL     Procalcitonin [[id number]]  (Normal) Collected: 10/21/22 2108    Lab Status: Final result Specimen: Blood from Arm, Left Updated: 10/21/22 2146     Procalcitonin 0 06 ng/ml     HS Troponin 0hr (reflex protocol) [[id number]]  (Normal) Collected: 10/21/22 2108    Lab Status: Final result Specimen: Blood from Arm, Left Updated: 10/21/22 2145     hs TnI 0hr 42 ng/L     UA w Reflex to Microscopic w Reflex to Culture [[id number]]  (Abnormal) Collected: 10/21/22 2128    Lab Status: Final result Specimen: Urine, Other Updated: 10/21/22 2145     Color, UA Light Yellow     Clarity, UA Turbid     Specific Gravity, UA >=1 030     pH, UA 6 0     Leukocytes, UA Negative     Nitrite, UA Negative     Protein, UA >=300 mg/dl      Glucose,  (1/4%) mg/dl      Ketones, UA Negative mg/dl      Urobilinogen, UA 0 2 E U /dl      Bilirubin, UA Negative     Occult Blood, UA Large    Lactic acid [[id number]]  (Normal) Collected: 10/21/22 2108    Lab Status: Final result Specimen: Blood from Arm, Left Updated: 10/21/22 2143     LACTIC ACID 0 5 mmol/L     Narrative:      Result may be elevated if tourniquet was used during collection      Pineda Delgadillo [[id number]]  (Normal) Collected: 10/21/22 2108    Lab Status: Final result Specimen: Blood from Arm, Left Updated: 10/21/22 2131     Protime 13 2 seconds      INR 1 00    APTT [[id number]]  (Normal) Collected: 10/21/22 2108    Lab Status: Final result Specimen: Blood from Arm, Left Updated: 10/21/22 2131     PTT 28 seconds     CBC and differential [[id number]]  (Abnormal) Collected: 10/21/22 2108    Lab Status: Final result Specimen: Blood from Arm, Left Updated: 10/21/22 2119     WBC 13 39 Thousand/uL      RBC 3 78 Million/uL      Hemoglobin 10 1 g/dL      Hematocrit 31 1 %      MCV 82 fL      MCH 26 7 pg      MCHC 32 5 g/dL      RDW 14 3 %      MPV 8 9 fL      Platelets 132 Thousands/uL      nRBC 0 /100 WBCs      Neutrophils Relative 68 %      Immat GRANS % 1 %      Lymphocytes Relative 17 %      Monocytes Relative 6 %      Eosinophils Relative 7 %      Basophils Relative 1 %      Neutrophils Absolute 9 17 Thousands/µL      Immature Grans Absolute 0 12 Thousand/uL      Lymphocytes Absolute 2 30 Thousands/µL      Monocytes Absolute 0 73 Thousand/µL      Eosinophils Absolute 0 99 Thousand/µL      Basophils Absolute 0 08 Thousands/µL     Fingerstick Glucose (POCT) [[id number]]  (Abnormal) Collected: 10/21/22 2009    Lab Status: Final result Updated: 10/21/22 2010     POC Glucose 165 mg/dl     Fingerstick Glucose (POCT) [[id number]]  (Abnormal) Collected: 10/21/22 1630    Lab Status: Final result Updated: 10/21/22 1632     POC Glucose 303 mg/dl                  CT lower extremity w contrast left   Final Result by Janel Fuller DO (10/22 0102)      No acute bony process is seen  Moderate superficial soft tissue edema in the foot, ankle, and leg, superimposed cellulitis considered in the appropriate clinical setting  Correlation with patient's symptoms and laboratory values recommended  No discrete drainable collection    identified  Other findings as above  Workstation performed: [id number]         XR chest 1 view portable   ED Interpretation by Fela Ortega PA-C (10/21 2203)   ED wet read:  Possible mild vascular congestion         XR foot 3+ views RIGHT    (Results Pending)              Procedures  ECG 12 Lead Documentation Only    Date/Time: 10/21/2022 10:15 PM  Performed by: Fela Ortega PA-C  Authorized by: Fela Ortega PA-C     ECG reviewed by me, the ED Provider: yes    Patient location:  ED  Rate:     ECG rate:  81    ECG rate assessment: normal    Rhythm:     Rhythm: sinus rhythm    Ectopy:     Ectopy: none    QRS:     QRS axis: Normal    QRS intervals:  Normal  Conduction:     Conduction: normal    ST segments:     ST segments:  Abnormal    Depression:  I, II, V5 and V6  T waves:     T waves: inverted      Inverted:  AVL  Other findings:     Other findings: prolonged qTc interval    ECG 12 Lead Documentation Only    Date/Time: 10/22/2022 1:37 AM  Performed by: Deirdre Malik PA-C  Authorized by: Deirdre Malik PA-C     ECG reviewed by me, the ED Provider: yes    Patient location:  ED  Rate:     ECG rate:  82    ECG rate assessment: normal    Rhythm:     Rhythm: sinus rhythm    Ectopy:     Ectopy: none    QRS:     QRS axis:  Normal    QRS intervals:  Normal  Conduction:     Conduction: normal    ST segments:     ST segments:  Abnormal    Depression:  I, II, V5, V6 and V4  T waves:     T waves: inverted      Inverted:  AVL  Other findings:     Other findings: prolonged qTc interval    ECG 12 Lead Documentation Only    Date/Time: 10/22/2022 1:39 AM  Performed by: Deirdre Malik PA-C  Authorized by: Deirdre Malik PA-C     ECG reviewed by me, the ED Provider: yes    Patient location:  ED  Rate:     ECG rate:  79    ECG rate assessment: normal    Rhythm:     Rhythm: sinus rhythm    Ectopy:     Ectopy: none    QRS:     QRS axis:  Normal    QRS intervals:  Normal  Conduction:     Conduction: normal    ST segments:     ST segments:  Abnormal    Depression:  V6 and I  T waves:     T waves: flattening      Flattening:  III  Other findings:     Other findings: prolonged qTc interval               ED Course  ED Course as of 10/22/22 0359   Fri Oct 21, 2022   2209 Potassium(!!): 2 4  PO and IV repletion ordered  2209 Glucose, Random(!): 176  Hx of DM, has received 500 mL NS     2210 WBC(!): 13 39   2210 Hemoglobin(!): 10 1  Slightly lower than pt's baseline     2210 NT-proBNP(!): 2,405  Possible mild vascular congestion on XR  No complaint of dyspnea or SOB from pt and O2 sat is 98% on RA  Will defer lasix at this time  2211 Urine Microscopic(!)  Pt currently receiving dose of zosyn  Pending urine culture  2316 250 ML fluids added for K infusion   2339 In light of elevated blood pressure on the ED will prescribe patient's at-home blood pressure medications  Patient reports she did not take her blood pressure medications today as she had a doctor's appointment and was unsure if they would be changing her medications  Continues with no complaints chest pain or shortness of breath  Sat Oct 22, 2022   0135 CT IMPRESSION:     No acute bony process is seen      Moderate superficial soft tissue edema in the foot, ankle, and leg, superimposed cellulitis considered in the appropriate clinical setting  Correlation with patient's symptoms and laboratory values recommended  No discrete drainable collection   identified      Other findings as above    0209 BP remains elevated even after at-home BP medications  Patient continues with no complaints of chest pain, shortness of breath or headache  Will give a dose of hydralazine in hopes to lower BP  Reports pain has improved with IV morphine  No new complaints at this time  Verbally communicated all above findings with the patient who verbalized her understanding  Discussed that she will be admitted for re-evaluation and further management  Patient agreeable and stable admission  Had detailed discussion with REAL who agrees with inpatient admission for re-evaluation and further management  HEART Risk Score    Flowsheet Row Most Recent Value   Heart Score Risk Calculator    History 0 Filed at: 10/21/2022 2216   ECG 1 Filed at: 10/21/2022 2216   Age 1 Filed at: 10/21/2022 2216   Risk Factors 2 Filed at: 10/21/2022 2216   Troponin 2 Filed at: 10/21/2022 2216   HEART Score 6 Filed at: 10/21/2022 2216                     Initial Sepsis Screening     Row Name 10/21/22 2200                Is the patient's history suggestive of a new or worsening infection?  Yes (Proceed) -OB        Suspected source of infection soft tissue;wound infection  -OB        Are two or more of the following signs & symptoms of infection both present and new to the patient? Yes (Proceed)  -OB        Indicate SIRS criteria Tachycardia > 90 bpm;Leukocytosis (WBC > 60801 IJL)  -OB        If the answer is yes to both questions, suspicion of sepsis is present --        If severe sepsis is present AND tissue hypoperfusion perists in the hour after fluid resuscitation or lactate > 4, the patient meets criteria for SEPTIC SHOCK --        Are any of the following organ dysfunction criteria present within 6 hours of suspected infection and SIRS criteria that are NOT considered to be chronic conditions? No  -OB        Organ dysfunction --        Date of presentation of severe sepsis --        Time of presentation of severe sepsis --        Tissue hypoperfusion persists in the hour after crystalloid fluid administration, evidenced, by either: --        Was hypotension present within one hour of the conclusion of crystalloid fluid administration? --        Date of presentation of septic shock --        Time of presentation of septic shock --              User Key  (r) = Recorded By, (t) = Taken By, (c) = Cosigned By    [facility] [address]  Name Provider Type    OB Naomi Molina PA-C Physician Assistant                SBIRT 20yo+    Flowsheet Row Most Recent Value   SBIRT (23 yo +)    In order to provide better care to our patients, we are screening all of our patients for alcohol and drug use  Would it be okay to ask you these screening questions?  No Filed at: 10/21/2022 2234                    MDM  Number of Diagnoses or Management Options     Amount and/or Complexity of Data Reviewed  Clinical lab tests: ordered and reviewed  Tests in the radiology section of CPT®: ordered and reviewed  Discuss the patient with other providers: yes  Independent visualization of images, tracings, or specimens: yes    Risk of Complications, Morbidity, and/or Mortality  General comments: Pt presenting with progressively worsening wound over b/l LE, L significantly worse than R  Pt does have a hx of Dm with reported poor glucose control- 165 today  Will order CT of LLE to in light of severely necrotic lesion that has been present over the last few months to r/o NSTI, abscess and osteomyelitis  Will order R foot XR  Also cardiac w/u in lihgt of OREILLY and sepsis labs and abx in light of tachy on arrival with significant LE wounds  Disposition will be admission after w/u is complete  Pt agreeable  Patient Progress  Patient progress: stable      Disposition  Final diagnoses:   UTI (urinary tract infection)   Hypertension   Open wound of foot, complicated, left, initial encounter   Hypokalemia   OREILLY (dyspnea on exertion)   Necrotic toes ([address] )   Sepsis ([address] )     Time reflects when diagnosis was documented in both MDM as applicable and the Disposition within this note     Time User Action Codes Description Comment    10/21/2022 11:40 PM Natalie Lung Add [N39 0] UTI (urinary tract infection)     10/21/2022 11:42 PM Yesica Monger Add [I10] Hypertension     10/21/2022 11:43 PM Yesica Monger Add [S91 302A] Open wound of foot, complicated, left, initial encounter     10/22/2022  1:55 AM Natalie Lung Add [E87 6] Hypokalemia     10/22/2022  1:55 AM Yesica Monger Add [R06 09] OREILLY (dyspnea on exertion)     10/22/2022  2:11 AM Yesica Monger Add [I96] Necrotic toes ([address] )     10/22/2022  3:45 AM Yesica Monger Add [A41 9] Sepsis [facility])       ED Disposition     ED Disposition   Admit    Condition   Stable    Date/Time   Sat Oct 22, 2022  2:10 AM    Comment   Case was discussed with REAL and the patient's admission status was agreed to be Admission Status: inpatient status to the service of Dr Kellie Gibbs              Follow-up Information    None         Patient's Medications   Discharge Prescriptions    No medications on file       No discharge procedures on file      PDMP Review       Value Time User    PDMP Reviewed  Yes 3/16/2022 11:56 AM ALEX Jernigan          ED Provider  Electronically Signed by           Cayden Oneal [address] [place], [place] [address]KETURAH  10/22/22 1959

## 2022-10-22 NOTE — CONSULTS
Podiatry - Consultation    Patient Information:   Madi [facility] Nadeem 65 y o  female MRN: [id number]  Unit/Bed#: E2 -01 Encounter: [id number]  PCP: ALEX Gupta  Date of Admission:  10/21/2022  Date of Consultation: 10/22/22  Requesting Physician: Gerson Aguilar DO      ASSESSMENT:    Madi Silver is a 65 y o  female with:    1  L heel wound  2  T2DM  3  Obesity    PLAN:    · Plan to continue local wound care of L heel and R 5th digit  Betadine JED applied to R 5th digit and betadine DSD to L heel  Will monitor for any progression to wet gangrene  · CT of L foot reviewed: no evidence of acute bony process or LELO  · XR of R foot reviewed: no acute osseous abnormality of LELO  · Wound care instructions placed  Appreciate nursing assistance with dressing changes  · Acute leukocytosis - WBC 13 57  Continue IV abx per primary team  Will trend labs/vitals  · Pulses are non palpable and ischemic changes noted to R 5th digit which will likely continue to demarcate  F/u LEADs  Recommend vascular consult  · Elevation and offloading on green foam wedges or pillows when non-ambulatory  · Rest of care per primary team   · Will discuss this plan with my attending and update as needed  Weightbearing status: Weightbearing as tolerated    SUBJECTIVE:    History of Present Illness:    Madi [facility] Nadeem is a 65 y o  female who is originally admitted 10/21/2022 due to meeting SIRS criteria along with non healing necrotic L foot wound  Patient has a past medical history of T2DM  She also has necrosis of R 5th digit  She does not remember when her L heel or R 5th digit wounds started  She states that she has not been herself all summer and "let myself go"  She went to her primary care recently who sent her into the hospital for her feet  She complains of mild pain to her feet at the necrotic areas  She complains of some numbness and tingling to her toes  She has no other pedal complaints   She denies N/V/F/CP/SOB  Review of Systems:    Constitutional: Negative  HENT: Negative  Eyes: Negative  Respiratory: Negative  Cardiovascular: Negative  Gastrointestinal: Negative  Musculoskeletal: negative   Skin:L heel and R 5th digit necrosis   Neurological: numbness and tingling to feet b/l  Psych: Negative  Past Medical and Surgical History:     Past Medical History:   Diagnosis Date   • Anxiety    • Depression    • Diabetes ([facility] )    • Diabetes mellitus ([place] [address] )    • Esophageal reflux     28 APR 2015 RESOLVED   • Hyperlipidemia    • Hypertension    • Low back pain     sciatica   • Pneumonia 2019   • Stroke ([facility] ) 12/2015    small vessel, L frontal corona radiata   Rx asa 81, Lipitor 40, risk modification   • Vitamin D deficiency        Past Surgical History:   Procedure Laterality Date   • COLONOSCOPY         Meds/Allergies:    Medications Prior to Admission   Medication   • ALPRAZolam (XANAX) 0 25 mg tablet   • amLODIPine (NORVASC) 10 mg tablet   • aspirin 81 mg chewable tablet   • atorvastatin (LIPITOR) 40 mg tablet   • clotrimazole-betamethasone (LOTRISONE) 1-0 05 % cream   • fluocinonide (LIDEX) 0 05 % ointment   • gabapentin (NEURONTIN) 100 mg capsule   • insulin aspart (NovoLOG FlexPen) 100 UNIT/ML injection pen   • insulin glargine (Lantus SoloStar) 100 units/mL injection pen   • lisinopril (ZESTRIL) 40 mg tablet   • metoprolol succinate (TOPROL-XL) 25 mg 24 hr tablet   • Multiple Vitamin (multivitamin) tablet   • sertraline (ZOLOFT) 100 mg tablet   • Trulicity 5 10 ZV/4 4EB SOPN   • Cholecalciferol (Vitamin D-3) 25 MCG (1000 UT) CAPS   • Continuous Blood Gluc  (FreeStyle Noé 14 Day [place]) LITO   • Continuous Blood Gluc Sensor (FreeStyle Noé 14 Day Sensor) MISC   • Doxylamine Succinate, Sleep, (UNISOM PO)   • glucose blood (True Metrix Blood Glucose Test) test strip   • Insulin Pen Needle (B-D UF III MINI PEN NEEDLES) 31G X 5 MM MISC   • Lancets 28G MISC   • pantoprazole (PROTONIX) 40 mg tablet   • potassium chloride (K-DUR,KLOR-CON) 10 mEq tablet       No Known Allergies    Social History:     Marital Status: Single    Substance Use History:   Social History     Substance and Sexual Activity   Alcohol Use No    Comment: OCCASIONAL ALCOHOL USE IN ALL SCRIPTS     Social History     Tobacco Use   Smoking Status Former Smoker   • Types: Cigarettes   • Quit date:    • Years since quittin 8   Smokeless Tobacco Never Used     Social History     Substance and Sexual Activity   Drug Use No       Family History:    Family History   Problem Relation Age of Onset   • Diabetes Mother    • Lung cancer Mother    • Cancer Father    • Lung cancer Father    • Hypertension Brother    • Hypertension Family          OBJECTIVE:    Vitals:   Blood Pressure: (!) 187/81 (10/22/22 0859)  Pulse: 88 (10/22/22 0859)  Temperature: (!) 96 4 °F (35 8 °C) (10/22/22 0859)  Temp Source: Temporal (10/22/22 0859)  Respirations: 16 (10/22/22 0859)  SpO2: 98 % (10/22/22 0859)    Physical Exam:    General Appearance: Alert, cooperative, no distress  HEENT: Head normocephalic, atraumatic, without obvious abnormality  Heart: Normal rate and rhythm  Lungs: Non-labored breathing  No respiratory distress  Abdomen: Without distension  Psychiatric: AAOx3  Lower Extremity:    Vascular:   DP: Right: non-palpable Left: non-palpable  PT: Right: non-palpable Left: non-palpable  CRT < 3 seconds at the digits  +0/4 edema noted at bilateral lower extremities  Pedal hair is absent  Skin temperature is WNL bilaterally  Musculoskeletal:  MMT is 5/5 in all muscle compartments bilaterally  Pain on palpation of L heel and R 5th digit  No other gross deformities noted, no history of amputations       Dermatological:  Lower extremity wound(s) as noted below:    Wound #: 1  Location: L heel  Length 9cm: Width 7 5cm: Depth N/A:   Deepest Tissue Noted in Base: eschar  Probe to Bone: No  Peripheral Skin Description: Attached  Granulation: 0% Fibrotic Tissue: 0% Necrotic Tissue: 100%   Drainage Amount: minimal serous, cloudy  Signs of Infection: No      L medial leg has a small wound that is dry with necrotic base  1x1cm, depth unable to assess as it is eschar  No surrounding cellulitis, drainage or fluctuance  R 5th digit is black/blue in pigmentation, indicative of ischemic changes  No drainage noted  No surrounding cellulitis or fluctuance  Neurological:  Gross sensation is intact  Light touch is diminished  Protective sensation is diminished  Clinical Images 10/22/22:          Additional data:     Lab Results: I have personally reviewed pertinent labs including:    Results from last 7 days   Lab Units 10/22/22  0502   WBC Thousand/uL 13 57*   HEMOGLOBIN g/dL 10 1*   HEMATOCRIT % 30 7*   PLATELETS Thousands/uL 334   NEUTROS PCT % 71   LYMPHS PCT % 14   MONOS PCT % 6   EOS PCT % 7*     Results from last 7 days   Lab Units 10/22/22  0502 10/21/22  2108   POTASSIUM mmol/L 2 4* 2 4*   CHLORIDE mmol/L 103 102   CO2 mmol/L 32 31   BUN mg/dL 13 16   CREATININE mg/dL 1 09 1 10   CALCIUM mg/dL 8 2* 8 6   ALK PHOS U/L  --  104   ALT U/L  --  15   AST U/L  --  15     Results from last 7 days   Lab Units 10/21/22  2108   INR  1 00       Cultures: I have personally reviewed pertinent cultures including:    Results from last 7 days   Lab Units 10/21/22  2108 10/21/22  2107   BLOOD CULTURE  Received in Microbiology Lab  Culture in Progress  Received in Microbiology Lab  Culture in Progress  Imaging: I have personally reviewed pertinent reports in PACS  EKG, Pathology, and Other Studies: I have personally reviewed pertinent reports  Time Spent for Care: 30 minutes  More than 50% of total time spent on counseling and coordination of care as described above  ** Please Note: Portions of the record may have been created with voice recognition software   Occasional wrong word or "sound a like" substitutions may have occurred due to the inherent limitations of voice recognition software  Read the chart carefully and recognize, using context, where substitutions have occurred   **

## 2022-10-22 NOTE — H&P
[facility]  H&P- Venkatesh Arnold 1960, 65 y o  female MRN: [id number]  Unit/Bed#: ED 15 Encounter: [id number]  Primary Care Provider: ALEX Carr   Date and time admitted to hospital: 10/21/2022  7:58 PM    * Non healing left heel wound  Assessment & Plan  Diabetic wound due to noncompliance from mental health and suspect kristina component  Ct lower extremity concerning for edema w/o osteo or gas  Iv zosyn check leads consult podiatry    Hypokalemia  Assessment & Plan  2* poor po intake and n/v w/hypomagnesemia  Replete both and repeat in am    Hypertensive urgency  Assessment & Plan  Likely 2* med noncompliance due to MH/kristina issues and acute infection  rec'd oral meds and IV hydralazine 5mg in ed w/o significant improvement in bp  Iv hydralazine 15mg once stat  If no improvement labetalol IV 10mg if HR permits  Low threshold for cardene gtt to avoid end organ damage    Abnormal EKG  Assessment & Plan  W/o cp  W/ischemic changes in inferolateral leads but stable across multiple ekgs  Given no cp less likely angina but may be related to htn urgency  Monitor on tele  bp control  Sepsis ([facility] )  Assessment & Plan  poa by Leucocytosis  And tachycardia  Suspect 2* left foot wound  ivf monitor cbc and bcx    N&V (nausea and vomiting)  Assessment & Plan  Suspect 2* acute infection no s/sx of dka  Supportive care    Wound of lower extremity  Assessment & Plan  Eschar of left foot which is boggy and weeping    Ct scan concerning for soft tissue edema and sirs vs sepsis  rec'd Vanc/zosyn IV will continue w/zosyn for now    Type 2 diabetes mellitus with hyperglycemia, with long-term current use of insulin [facility])  Assessment & Plan  Lab Results   Component Value Date    HGBA1C 9 4 (A) 03/16/2022       Recent Labs     10/21/22  1630 10/21/22  2009   POCGLU 303* 165*       Blood Sugar Average: Last 72 hrs:  (P) 234   Poorly controlled due to partially mental health and financial issues  Is on lantus 70 iu qhs and novolog 20 or 30 iu tid but has been stretching this out for affordability  Continue lantus 70 iu qhs and hold novolog and start ssi/poc bs    Depression, recurrent (HCC)  Assessment & Plan  W/o SI HI   contributign to poor wound care and overall health care  Continue zoloft, start xanax prn anxiety    VTE Prophylaxis: Enoxaparin (Lovenox)  / sequential compression device   Code Status: fc  POLST: There is no POLST form on file for this patient (pre-hospital)  Discussion with family:     Anticipated Length of Stay:  Patient will be admitted on an Inpatient basis with an anticipated length of stay of  Greater than 2 midnights  Justification for Hospital Stay: nonhealing diabetic foot wound    Total Time for Visit, including Counseling / Coordination of Care: 45 minutes  Greater than 50% of this total time spent on direct patient counseling and coordination of care  Chief Complaint:   Left foot wound drainage    History of Present Illness:    Ami Encarnacion is a 65 y o  female who presents with pmh of IDDM, HTN, hld, obesity depression coming to hospital for left foot wound  Pt notes this has been ongoing for 'a while' and started as a small sore but continued to worsen  She had seen a podiatrist for this and pcp previously but thought it would get better  She endorses she wasn't always compliant with their recommendations  She put off seeking care for this wound for some time due to mental health issues and kristina issues  She saw her PCP for this on 10/21/22 who noted the eschar and sent her to the ED for further evaluation  The pt notes she has been having some fevers at home earlier the week prior as well as episodic n/v and poor po intake  She has not been taking her insulin regimen as prescribed due to depression and kristina issues often stretching it out to once a day with short acting insulin and every other day for her long acting insulin    In ed she was evaluated w/concern for sepsis and left foot wound infection and admission was requested  Review of Systems:    Review of Systems   Constitutional: Positive for chills and fever  Gastrointestinal: Positive for nausea  Negative for abdominal pain  Skin: Positive for color change and wound  All other systems reviewed and are negative  Past Medical and Surgical History:     Past Medical History:   Diagnosis Date   • Anxiety    • Depression    • Diabetes ([facility] )    • Diabetes mellitus ([facility] )    • Esophageal reflux     28 APR 2015 RESOLVED   • Hyperlipidemia    • Hypertension    • Low back pain     sciatica   • Pneumonia 2019   • Stroke ([place] [address] ) 12/2015    small vessel, L frontal corona radiata  Rx asa 81, Lipitor 40, risk modification   • Vitamin D deficiency        Past Surgical History:   Procedure Laterality Date   • COLONOSCOPY         Meds/Allergies:    Prior to Admission medications    Medication Sig Start Date End Date Taking?  Authorizing Provider   ALPRAZolam Molina Anchors) 0 25 mg tablet Take 1 tablet (0 25 mg total) by mouth daily as needed for anxiety (panic attacks) 4/15/22  Yes ALEX Swartz   amLODIPine (NORVASC) 10 mg tablet TAKE 1 TABLET BY MOUTH EVERY DAY 9/28/22  Yes ALEX Swartz   aspirin 81 mg chewable tablet Chew 81 mg 12/15/15  Yes Historical Provider, MD   atorvastatin (LIPITOR) 40 mg tablet TAKE 1 TABLET BY MOUTH EVERY DAY 2/8/22  Yes ALEX Swartz   clotrimazole-betamethasone (LOTRISONE) 1-0 05 % cream Apply topically 2 (two) times a day 3/16/22  Yes ALEX Swartz   fluocinonide (LIDEX) 0 05 % ointment Apply topically 2 (two) times a day 4/27/22  Yes Padmini Arizmendi DPM   gabapentin (NEURONTIN) 100 mg capsule TAKE 2 CAPSULES BY MOUTH TWICE A DAY 8/27/22  Yes ALEX Swartz   insulin aspart (NovoLOG FlexPen) 100 UNIT/ML injection pen Inject 20 Units under the skin 3 (three) times a day with meals 3/31/22  Yes Nilesh Fletcher MD   insulin glargine (Lantus SoloStar) 100 units/mL injection pen Inject 60 Units under the skin daily at bedtime 3/31/22  Yes Eliane Connor MD   lisinopril (ZESTRIL) 40 mg tablet TAKE 1 TABLET BY MOUTH EVERY DAY 9/7/22  Yes ALEX Rhodes   metoprolol succinate (TOPROL-XL) 25 mg 24 hr tablet TAKE 1 TABLET (25 MG TOTAL) BY MOUTH DAILY   10/2/22  Yes ALEX Ornelas   Multiple Vitamin (multivitamin) tablet Take 1 tablet by mouth daily   Yes Historical Provider, MD   sertraline (ZOLOFT) 100 mg tablet TAKE 1 TABLET BY MOUTH EVERY DAY 8/1/22  Yes ALEX Rhodes   Trulicity 5 10 WE/9 1JI SOPN INJECT 0 5 ML (0 75 MG TOTAL) UNDER THE SKIN ONCE A WEEK TUESDAY 5/5/22  Yes Eliane Connor MD   Cholecalciferol (Vitamin D-3) 25 MCG (1000 UT) CAPS Take 2,000 Units by mouth daily  Patient not taking: Reported on 10/21/2022    Historical Provider, MD   Continuous Blood Gluc  ([employer] 14 Day [place]) LITO Use 1 Units continuous  Patient not taking: Reported on 10/21/2022 8/25/21   Melvi Varma MD   Continuous Blood Gluc Sensor (FreeStyle Noé 14 Day Sensor) MISC Use daily as directed for CGM - Change every 14 days  Patient not taking: No sig reported 3/31/22   Eliane Connor MD   Doxylamine Succinate, Sleep, (UNISOM PO) Take by mouth  Patient not taking: No sig reported    Historical Provider, MD   glucose blood (True Metrix Blood Glucose Test) test strip Use 1 each 4 (four) times a day  Patient not taking: No sig reported 6/14/21   ALEX Rhodes   Insulin Pen Needle (B-D UF III MINI PEN NEEDLES) 31G X 5 MM MISC USE WITH INSULIN FOUR TIMES DAILY 4/15/22   ALEX Ornelas   Lancets 28G MISC Use 1 each 4 (four) times a day  Patient not taking: No sig reported 6/14/21   ALEX Rhodes   pantoprazole (PROTONIX) 40 mg tablet Take 1 tablet (40 mg total) by mouth daily for 14 days 2/21/22 3/7/22  Tamela Francois MD   potassium chloride (K-DUR,KLOR-CON) 10 mEq tablet Take 2 tablets (20 mEq total) by mouth 2 (two) times a day for 2 doses 22  Terrance Yoo MD     I have reviewed home medications with patient personally  Allergies: No Known Allergies    Social History:     Marital Status: Single   Occupation:   Patient Pre-hospital Living Situation:   Patient Pre-hospital Level of Mobility:   Patient Pre-hospital Diet Restrictions:   Substance Use History:   Social History     Substance and Sexual Activity   Alcohol Use No    Comment: OCCASIONAL ALCOHOL USE IN ALL SCRIPTS     Social History     Tobacco Use   Smoking Status Former Smoker   • Types: Cigarettes   • Quit date:    • Years since quittin 8   Smokeless Tobacco Never Used     Social History     Substance and Sexual Activity   Drug Use No       Family History:    Family History   Problem Relation Age of Onset   • Diabetes Mother    • Lung cancer Mother    • Cancer Father    • Lung cancer Father    • Hypertension Brother    • Hypertension Family        Physical Exam:     Vitals:   Blood Pressure: (!) 180/75 (10/22/22 3736)  Pulse: 87 (10/22/22 0637)  Temperature: 98 2 °F (36 8 °C) (10/21/22 1955)  Temp Source: Oral (10/21/22 1955)  Respirations: 16 (10/22/22 0637)  SpO2: 96 % (10/22/22 3987)    Physical Exam  Vitals reviewed  Constitutional:       General: She is not in acute distress  Appearance: She is obese  She is ill-appearing  She is not toxic-appearing or diaphoretic  HENT:      Head: Normocephalic and atraumatic  Right Ear: External ear normal       Left Ear: External ear normal    Eyes:      Extraocular Movements: Extraocular movements intact  Cardiovascular:      Rate and Rhythm: Regular rhythm  Tachycardia present  Pulmonary:      Breath sounds: No wheezing, rhonchi or rales  Abdominal:      General: There is no distension  Palpations: There is no mass  Tenderness: There is no abdominal tenderness  There is no guarding or rebound  Hernia: No hernia is present  Comments: Active nonbilious nonbloody n/v    Musculoskeletal:      Right lower leg: Edema present  Left lower leg: Edema present  Skin:     General: Skin is warm  Neurological:      Mental Status: She is alert  Additional Data:     Lab Results: I have personally reviewed pertinent reports  Results from last 7 days   Lab Units 10/22/22  0502   WBC Thousand/uL 13 57*   HEMOGLOBIN g/dL 10 1*   HEMATOCRIT % 30 7*   PLATELETS Thousands/uL 334   NEUTROS PCT % 71   LYMPHS PCT % 14   MONOS PCT % 6   EOS PCT % 7*     Results from last 7 days   Lab Units 10/22/22  0502 10/21/22  2108   SODIUM mmol/L 143 143   POTASSIUM mmol/L 2 4* 2 4*   CHLORIDE mmol/L 103 102   CO2 mmol/L 32 31   BUN mg/dL 13 16   CREATININE mg/dL 1 09 1 10   ANION GAP mmol/L 8 10   CALCIUM mg/dL 8 2* 8 6   ALBUMIN g/dL  --  2 3*   TOTAL BILIRUBIN mg/dL  --  0 19*   ALK PHOS U/L  --  104   ALT U/L  --  15   AST U/L  --  15   GLUCOSE RANDOM mg/dL 203* 176*     Results from last 7 days   Lab Units 10/21/22  2108   INR  1 00     Results from last 7 days   Lab Units 10/21/22  2009 10/21/22  1630   POC GLUCOSE mg/dl 165* 303*         Results from last 7 days   Lab Units 10/21/22  2108   LACTIC ACID mmol/L 0 5   PROCALCITONIN ng/ml 0 06       Imaging: I have personally reviewed pertinent reports  CT lower extremity w contrast left   Final Result by Elizabeth Garcia DO (10/22 0102)      No acute bony process is seen  Moderate superficial soft tissue edema in the foot, ankle, and leg, superimposed cellulitis considered in the appropriate clinical setting  Correlation with patient's symptoms and laboratory values recommended  No discrete drainable collection    identified  Other findings as above  Workstation performed: [id number]         XR chest 1 view portable   ED Interpretation by Adriana Richter PA-C (10/21 2203)   ED wet read:  Possible mild vascular congestion         XR foot 3+ views RIGHT    (Results Pending)   VAS lower limb arterial duplex, complete bilateral    (Results Pending)       EKG, Pathology, and Other Studies Reviewed on Admission:   · EKG: st segment depressions in inferolateral leads    Allscripts / Epic Records Reviewed: Yes     ** Please Note: This note has been constructed using a voice recognition system   **

## 2022-10-22 NOTE — ASSESSMENT & PLAN NOTE
Diabetic wound due to noncompliance from mental health and suspect kristina component  Ct lower extremity concerning for edema w/o osteo or gas  Iv zosyn check leads consult podiatry

## 2022-10-22 NOTE — DISCHARGE INSTRUCTIONS
ARTERIOGRAM    WHAT YOU SHOULD KNOW:   An angiogram is a procedure to look at arteries in your body  Arteries are the blood vessels that carry blood from your heart to your body  AFTER YOU LEAVE:     Self-care:   Limit activity: Rest for the remainder of the day of your procedure  Have some one with you until the next morning  Keep your arm or leg straight as much as possible  Rest as much as possible, sitting lying or reclining  Walk only to go to the bathroom, to bed or to eat  If the angiogram catheter was put in your leg, use the stairs as little as possible  No driving  Keep your wound clean and dry  You may shower 24 hours after your procedure  The bandage you have on should fall off in 2-3 days  If there is any drainage from the puncture site, you should put on a clean bandage  Watch for bleeding and bruising: It is normal to have a bruise and soreness where the angiogram catheter went in  Diet:   You may resume your regular diet, Sips of flat soda will help with mild nausea  Drink more liquids than usual for the next 24 hours      IMMEDIATELY Contact Interventional Radiology at [phone] Alethe[place] PATIENTS: Contact Interventional Radiology at [phone]) Wesley Navarrete PATIENTS: Contact Interventional Radiology at [phone]) if any of the following occur: If your bruise gets larger or if you notice any active bleeding  APPLY DIRECT PRESSURE TO THE BLEEDING SITE  If you notice increased swelling or have increased pain at the puncture site   If you have any numbness or pain in the extremity of the puncture site   If that extremity seems cold or pale  You have fever greater than 101  Persistent nausea or vomitting    Follow up with your primary healthcare provider  as directed: Write down your questions so you remember to ask them during your visits                 Moderate Sedation   WHAT YOU NEED TO KNOW:   Moderate sedation, or conscious sedation, is medicine used during procedures to help you feel relaxed and calm  You will be awake and able to follow directions without anxiety or pain  You will remember little to none of the procedure  You may feel tired, weak, or unsteady on your feet after you get sedation  You may also have trouble concentrating or short-term memory loss  These symptoms should go away in 24 hours or less  DISCHARGE INSTRUCTIONS:   Call 911 or have someone else call for any of the following: You have sudden trouble breathing  You cannot be woken  Seek care immediately if:   You have a severe headache or dizziness  Your heart is beating faster than usual   Contact your healthcare provider if:   You have a fever  You have nausea or are vomiting for more than 8 hours after the procedure  Your skin is itchy, swollen, or you have a rash  You have questions or concerns about your condition or care  Self-care:   Have someone stay with you for 24 hours  This person can drive you to errands and help you do things around the house  This person can also watch for problems  Rest and do quiet activities for 24 hours  Do not exercise, ride a bike, or play sports  Stand up slowly to prevent dizziness and falls  Take short walks around the house with another person  Slowly return to your usual activities the next day  Do not drive or use dangerous machines or tools for 24 hours  You may injure yourself or others  Examples include a lawnmower, saw, or drill  Do not return to work for 24 hours if you use dangerous machines or tools for work  Do not make important decisions for 24 hours  For example, do not sign important papers or invest money  Drink liquids as directed  Liquids help flush the sedation medicine out of your body  Ask how much liquid to drink each day and which liquids are best for you  Eat small, frequent meals to prevent nausea and vomiting  Start with clear liquids such as juice or broth   If you do not vomit after clear liquids, you can eat your usual foods  Do not drink alcohol or take medicines that make you drowsy  This includes medicines that help you sleep and anxiety medicines  Ask your healthcare provider if it is safe for you to take pain medicine  Follow up with your healthcare provider as directed: Write down your questions so you remember to ask them during your visits  © 2017 [employer] [employer] Information is for End User's use only and may not be sold, redistributed or otherwise used for commercial purposes  All illustrations and images included in CareNotes® are the copyrighted property of [employer]  or [employer]  The above information is an  only  It is not intended as medical advice for individual conditions or treatments  Talk to your doctor, nurse or pharmacist before following any medical regimen to see if it is safe and effective for you

## 2022-10-22 NOTE — PLAN OF CARE
Problem: Potential for Falls  Goal: Patient will remain free of falls  Description: INTERVENTIONS:  - Educate patient/family on patient safety including physical limitations  - Instruct patient to call for assistance with activity   - Consult OT/PT to assist with strengthening/mobility   - Keep Call bell within reach  - Keep bed low and locked with side rails adjusted as appropriate  - Keep care items and personal belongings within reach  - Initiate and maintain comfort rounds  - Make Fall Risk Sign visible to staff  - Offer Toileting every 2 Hours, in advance of need  - Initiate/Maintain bed alarm  - Obtain necessary fall risk management equipment: call bell, bed alarm  - Apply yellow socks and bracelet for high fall risk patients  - Consider moving patient to room near nurses station  Outcome: Progressing     Problem: MOBILITY - ADULT  Goal: Maintain or return to baseline ADL function  Description: INTERVENTIONS:  -  Assess patient's ability to carry out ADLs; assess patient's baseline for ADL function and identify physical deficits which impact ability to perform ADLs (bathing, care of mouth/teeth, toileting, grooming, dressing, etc )  - Assess/evaluate cause of self-care deficits   - Assess range of motion  - Assess patient's mobility; develop plan if impaired  - Assess patient's need for assistive devices and provide as appropriate  - Encourage maximum independence but intervene and supervise when necessary  - Involve family in performance of ADLs  - Assess for home care needs following discharge   - Consider OT consult to assist with ADL evaluation and planning for discharge  - Provide patient education as appropriate  Outcome: Progressing  Goal: Maintains/Returns to pre admission functional level  Description: INTERVENTIONS:  - Perform BMAT or MOVE assessment daily    - Set and communicate daily mobility goal to care team and patient/family/caregiver     - Collaborate with rehabilitation services on mobility goals if consulted  - Perform Range of Motion 3 times a day  - Reposition patient every 2 hours    - Dangle patient 3 times a day  - Stand patient 3 times a day  - Ambulate patient 3 times a day  - Out of bed to chair 3 times a day   - Out of bed for meals 3 times a day  - Out of bed for toileting  - Record patient progress and toleration of activity level   Outcome: Progressing     Problem: PAIN - ADULT  Goal: Verbalizes/displays adequate comfort level or baseline comfort level  Description: Interventions:  - Encourage patient to monitor pain and request assistance  - Assess pain using appropriate pain scale  - Administer analgesics based on type and severity of pain and evaluate response  - Implement non-pharmacological measures as appropriate and evaluate response  - Consider cultural and social influences on pain and pain management  - Notify physician/advanced practitioner if interventions unsuccessful or patient reports new pain  Outcome: Progressing     Problem: INFECTION - ADULT  Goal: Absence or prevention of progression during hospitalization  Description: INTERVENTIONS:  - Assess and monitor for signs and symptoms of infection  - Monitor lab/diagnostic results  - Monitor all insertion sites, i e  indwelling lines, tubes, and drains  - Monitor endotracheal if appropriate and nasal secretions for changes in amount and color  - Reading appropriate cooling/warming therapies per order  - Administer medications as ordered  - Instruct and encourage patient and family to use good hand hygiene technique  - Identify and instruct in appropriate isolation precautions for identified infection/condition  Outcome: Progressing  Goal: Absence of fever/infection during neutropenic period  Description: INTERVENTIONS:  - Monitor WBC    Outcome: Progressing  Goal: Maintain or return to baseline ADL function  Description: INTERVENTIONS:  -  Assess patient's ability to carry out ADLs; assess patient's baseline for ADL function and identify physical deficits which impact ability to perform ADLs (bathing, care of mouth/teeth, toileting, grooming, dressing, etc )  - Assess/evaluate cause of self-care deficits   - Assess range of motion  - Assess patient's mobility; develop plan if impaired  - Assess patient's need for assistive devices and provide as appropriate  - Encourage maximum independence but intervene and supervise when necessary  - Involve family in performance of ADLs  - Assess for home care needs following discharge   - Consider OT consult to assist with ADL evaluation and planning for discharge  - Provide patient education as appropriate  Outcome: Progressing    Problem: DISCHARGE PLANNING  Goal: Discharge to home or other facility with appropriate resources  Description: INTERVENTIONS:  - Identify barriers to discharge w/patient and caregiver  - Arrange for needed discharge resources and transportation as appropriate  - Identify discharge learning needs (meds, wound care, etc )  - Arrange for interpretive services to assist at discharge as needed  - Refer to Case Management Department for coordinating discharge planning if the patient needs post-hospital services based on physician/advanced practitioner order or complex needs related to functional status, cognitive ability, or social support system  Outcome: Progressing     Problem: Knowledge Deficit  Goal: Patient/family/caregiver demonstrates understanding of disease process, treatment plan, medications, and discharge instructions  Description: Complete learning assessment and assess knowledge base    Interventions:  - Provide teaching at level of understanding  - Provide teaching via preferred learning methods  Outcome: Progressing     Problem: METABOLIC, FLUID AND ELECTROLYTES - ADULT  Goal: Electrolytes maintained within normal limits  Description: INTERVENTIONS:  - Monitor labs and assess patient for signs and symptoms of electrolyte imbalances  - Administer electrolyte replacement as ordered  - Monitor response to electrolyte replacements, including repeat lab results as appropriate  - Instruct patient on fluid and nutrition as appropriate  Outcome: Progressing  Goal: Fluid balance maintained  Description: INTERVENTIONS:  - Monitor labs   - Monitor I/O and WT  - Instruct patient on fluid and nutrition as appropriate  - Assess for signs & symptoms of volume excess or deficit  Outcome: Progressing  Goal: Glucose maintained within target range  Description: INTERVENTIONS:  - Monitor Blood Glucose as ordered  - Assess for signs and symptoms of hyperglycemia and hypoglycemia  - Administer ordered medications to maintain glucose within target range  - Assess nutritional intake and initiate nutrition service referral as needed  Outcome: Progressing     Problem: MUSCULOSKELETAL - ADULT  Goal: Maintain or return mobility to safest level of function  Description: INTERVENTIONS:  - Assess patient's ability to carry out ADLs; assess patient's baseline for ADL function and identify physical deficits which impact ability to perform ADLs (bathing, care of mouth/teeth, toileting, grooming, dressing, etc )  - Assess/evaluate cause of self-care deficits   - Assess range of motion  - Assess patient's mobility  - Assess patient's need for assistive devices and provide as appropriate  - Encourage maximum independence but intervene and supervise when necessary  - Involve family in performance of ADLs  - Assess for home care needs following discharge   - Consider OT consult to assist with ADL evaluation and planning for discharge  - Provide patient education as appropriate  Outcome: Progressing  Goal: Maintain proper alignment of affected body part  Description: INTERVENTIONS:  - Support, maintain and protect limb and body alignment  - Provide patient/ family with appropriate education  Outcome: Progressing

## 2022-10-22 NOTE — ASSESSMENT & PLAN NOTE
Lab Results   Component Value Date    HGBA1C 9 4 (A) 03/16/2022       Recent Labs     10/21/22  1630 10/21/22  2009   POCGLU 303* 165*       Blood Sugar Average: Last 72 hrs:  (P) 234   Poorly controlled due to partially mental health and financial issues  Is on lantus 70 iu qhs and novolog 20 or 30 iu tid but has been stretching this out for affordability  Continue lantus 70 iu qhs and hold novolog and start ssi/poc bs

## 2022-10-22 NOTE — SEPSIS NOTE
Sepsis Note   [address] [facility] [age] y o  female MRN: [id number]  Unit/Bed#: ED 15 Encounter: [id number]       qSOFA     [address] Name 10/22/22 0230 10/22/22 0130 10/22/22 0100 10/21/22 2327 10/21/22 2126    Altered mental status GCS < 15 -- -- -- -- --    Respiratory Rate > / =22 0 0 0 0 0    Systolic BP < / =235 0 0 0 0 0    Q Sofa Score 0 0 0 0 0    Row Name 10/21/22 2012 10/21/22 1955 10/21/22 1628          Altered mental status GCS < 15 -- -- --      Respiratory Rate > / =22 -- 0 0      Systolic BP < / =581 0 -- 0      Q Sofa Score 0 0 0                 Initial Sepsis Screening     Row Name 10/21/22 2200                Is the patient's history suggestive of a new or worsening infection? Yes (Proceed)  -OB        Suspected source of infection soft tissue;wound infection  -OB        Are two or more of the following signs & symptoms of infection both present and new to the patient? Yes (Proceed)  -OB        Indicate SIRS criteria Tachycardia > 90 bpm;Leukocytosis (WBC > 85176 IJL)  -OB        If the answer is yes to both questions, suspicion of sepsis is present --        If severe sepsis is present AND tissue hypoperfusion perists in the hour after fluid resuscitation or lactate > 4, the patient meets criteria for SEPTIC SHOCK --        Are any of the following organ dysfunction criteria present within 6 hours of suspected infection and SIRS criteria that are NOT considered to be chronic conditions?  No  -OB        Organ dysfunction --        Date of presentation of severe sepsis --        Time of presentation of severe sepsis --        Tissue hypoperfusion persists in the hour after crystalloid fluid administration, evidenced, by either: --        Was hypotension present within one hour of the conclusion of crystalloid fluid administration? --        Date of presentation of septic shock --        Time of presentation of septic shock --              User Key  (r) = Recorded By, (t) = Taken By, (c) = Cosigned By [id number]  Name Provider Type    OB Katie Pace PA-C Physician Assistant

## 2022-10-23 PROBLEM — N39.0 UTI (URINARY TRACT INFECTION): Status: ACTIVE | Noted: 2022-10-23

## 2022-10-23 LAB
ANION GAP SERPL CALCULATED.3IONS-SCNC: 10 MMOL/L (ref 4–13)
BASOPHILS # BLD AUTO: 0.05 THOUSANDS/ÂΜL (ref 0–0.1)
BASOPHILS NFR BLD AUTO: 0 % (ref 0–1)
BUN SERPL-MCNC: 15 MG/DL (ref 5–25)
CALCIUM SERPL-MCNC: 8 MG/DL (ref 8.3–10.1)
CHLORIDE SERPL-SCNC: 104 MMOL/L (ref 96–108)
CO2 SERPL-SCNC: 27 MMOL/L (ref 21–32)
CREAT SERPL-MCNC: 1.27 MG/DL (ref 0.6–1.3)
EOSINOPHIL # BLD AUTO: 0.08 THOUSAND/ÂΜL (ref 0–0.61)
EOSINOPHIL NFR BLD AUTO: 1 % (ref 0–6)
ERYTHROCYTE [DISTWIDTH] IN BLOOD BY AUTOMATED COUNT: 14.9 % (ref 11.6–15.1)
GFR SERPL CREATININE-BSD FRML MDRD: 45 ML/MIN/1.73SQ M
GLUCOSE SERPL-MCNC: 161 MG/DL (ref 65–140)
GLUCOSE SERPL-MCNC: 197 MG/DL (ref 65–140)
GLUCOSE SERPL-MCNC: 198 MG/DL (ref 65–140)
GLUCOSE SERPL-MCNC: 218 MG/DL (ref 65–140)
GLUCOSE SERPL-MCNC: 222 MG/DL (ref 65–140)
GLUCOSE SERPL-MCNC: 231 MG/DL (ref 65–140)
HCT VFR BLD AUTO: 29.3 % (ref 34.8–46.1)
HGB BLD-MCNC: 9.5 G/DL (ref 11.5–15.4)
IMM GRANULOCYTES # BLD AUTO: 0.06 THOUSAND/UL (ref 0–0.2)
IMM GRANULOCYTES NFR BLD AUTO: 0 % (ref 0–2)
LYMPHOCYTES # BLD AUTO: 1.57 THOUSANDS/ÂΜL (ref 0.6–4.47)
LYMPHOCYTES NFR BLD AUTO: 11 % (ref 14–44)
MAGNESIUM SERPL-MCNC: 1.9 MG/DL (ref 1.6–2.6)
MCH RBC QN AUTO: 27.5 PG (ref 26.8–34.3)
MCHC RBC AUTO-ENTMCNC: 32.4 G/DL (ref 31.4–37.4)
MCV RBC AUTO: 85 FL (ref 82–98)
MONOCYTES # BLD AUTO: 0.66 THOUSAND/ÂΜL (ref 0.17–1.22)
MONOCYTES NFR BLD AUTO: 4 % (ref 4–12)
NEUTROPHILS # BLD AUTO: 12.49 THOUSANDS/ÂΜL (ref 1.85–7.62)
NEUTS SEG NFR BLD AUTO: 84 % (ref 43–75)
NRBC BLD AUTO-RTO: 0 /100 WBCS
PLATELET # BLD AUTO: 338 THOUSANDS/UL (ref 149–390)
PMV BLD AUTO: 8.6 FL (ref 8.9–12.7)
POTASSIUM SERPL-SCNC: 2.9 MMOL/L (ref 3.5–5.3)
RBC # BLD AUTO: 3.45 MILLION/UL (ref 3.81–5.12)
SODIUM SERPL-SCNC: 141 MMOL/L (ref 135–147)
WBC # BLD AUTO: 14.91 THOUSAND/UL (ref 4.31–10.16)

## 2022-10-23 PROCEDURE — 85025 COMPLETE CBC W/AUTO DIFF WBC: CPT | Performed by: PHYSICIAN ASSISTANT

## 2022-10-23 PROCEDURE — 99232 SBSQ HOSP IP/OBS MODERATE 35: CPT | Performed by: PODIATRIST

## 2022-10-23 PROCEDURE — 82948 REAGENT STRIP/BLOOD GLUCOSE: CPT

## 2022-10-23 PROCEDURE — 99232 SBSQ HOSP IP/OBS MODERATE 35: CPT | Performed by: INTERNAL MEDICINE

## 2022-10-23 PROCEDURE — 80048 BASIC METABOLIC PNL TOTAL CA: CPT | Performed by: PHYSICIAN ASSISTANT

## 2022-10-23 PROCEDURE — 83735 ASSAY OF MAGNESIUM: CPT | Performed by: PHYSICIAN ASSISTANT

## 2022-10-23 RX ORDER — OXYCODONE HYDROCHLORIDE 5 MG/1
5 TABLET ORAL EVERY 4 HOURS PRN
Status: DISCONTINUED | OUTPATIENT
Start: 2022-10-23 | End: 2022-10-30

## 2022-10-23 RX ORDER — MORPHINE SULFATE 4 MG/ML
4 INJECTION, SOLUTION INTRAMUSCULAR; INTRAVENOUS EVERY 4 HOURS PRN
Status: DISCONTINUED | OUTPATIENT
Start: 2022-10-23 | End: 2022-11-10

## 2022-10-23 RX ADMIN — GABAPENTIN 200 MG: 100 CAPSULE ORAL at 08:15

## 2022-10-23 RX ADMIN — PIPERACILLIN AND TAZOBACTAM 3.38 G: 3; .375 INJECTION, POWDER, LYOPHILIZED, FOR SOLUTION INTRAVENOUS at 10:15

## 2022-10-23 RX ADMIN — INSULIN LISPRO 1 UNITS: 100 INJECTION, SOLUTION INTRAVENOUS; SUBCUTANEOUS at 11:48

## 2022-10-23 RX ADMIN — ACETAMINOPHEN 650 MG: 325 TABLET, FILM COATED ORAL at 10:15

## 2022-10-23 RX ADMIN — ALPRAZOLAM 0.25 MG: 0.25 TABLET ORAL at 21:36

## 2022-10-23 RX ADMIN — HEPARIN SODIUM 5000 UNITS: 5000 INJECTION INTRAVENOUS; SUBCUTANEOUS at 05:56

## 2022-10-23 RX ADMIN — HEPARIN SODIUM 5000 UNITS: 5000 INJECTION INTRAVENOUS; SUBCUTANEOUS at 13:25

## 2022-10-23 RX ADMIN — INSULIN GLARGINE 60 UNITS: 100 INJECTION, SOLUTION SUBCUTANEOUS at 21:26

## 2022-10-23 RX ADMIN — ACETAMINOPHEN 650 MG: 325 TABLET, FILM COATED ORAL at 17:51

## 2022-10-23 RX ADMIN — CARVEDILOL 12.5 MG: 12.5 TABLET, FILM COATED ORAL at 15:33

## 2022-10-23 RX ADMIN — PIPERACILLIN AND TAZOBACTAM 3.38 G: 3; .375 INJECTION, POWDER, LYOPHILIZED, FOR SOLUTION INTRAVENOUS at 17:06

## 2022-10-23 RX ADMIN — METOCLOPRAMIDE 5 MG: 5 INJECTION, SOLUTION INTRAMUSCULAR; INTRAVENOUS at 03:32

## 2022-10-23 RX ADMIN — INSULIN LISPRO 2 UNITS: 100 INJECTION, SOLUTION INTRAVENOUS; SUBCUTANEOUS at 08:14

## 2022-10-23 RX ADMIN — OXYCODONE HYDROCHLORIDE 5 MG: 5 TABLET ORAL at 15:33

## 2022-10-23 RX ADMIN — LISINOPRIL 40 MG: 20 TABLET ORAL at 08:15

## 2022-10-23 RX ADMIN — CARVEDILOL 12.5 MG: 12.5 TABLET, FILM COATED ORAL at 08:15

## 2022-10-23 RX ADMIN — OXYCODONE HYDROCHLORIDE 5 MG: 5 TABLET ORAL at 19:31

## 2022-10-23 RX ADMIN — AMLODIPINE BESYLATE 10 MG: 10 TABLET ORAL at 08:15

## 2022-10-23 RX ADMIN — PIPERACILLIN AND TAZOBACTAM 3.38 G: 3; .375 INJECTION, POWDER, LYOPHILIZED, FOR SOLUTION INTRAVENOUS at 21:30

## 2022-10-23 RX ADMIN — GABAPENTIN 200 MG: 100 CAPSULE ORAL at 17:01

## 2022-10-23 RX ADMIN — ASPIRIN 81 MG CHEWABLE TABLET 81 MG: 81 TABLET CHEWABLE at 08:15

## 2022-10-23 RX ADMIN — PIPERACILLIN AND TAZOBACTAM 3.38 G: 3; .375 INJECTION, POWDER, LYOPHILIZED, FOR SOLUTION INTRAVENOUS at 03:41

## 2022-10-23 RX ADMIN — HEPARIN SODIUM 5000 UNITS: 5000 INJECTION INTRAVENOUS; SUBCUTANEOUS at 21:27

## 2022-10-23 RX ADMIN — SERTRALINE HYDROCHLORIDE 100 MG: 100 TABLET ORAL at 08:15

## 2022-10-23 RX ADMIN — INSULIN LISPRO 1 UNITS: 100 INJECTION, SOLUTION INTRAVENOUS; SUBCUTANEOUS at 21:27

## 2022-10-23 RX ADMIN — INSULIN LISPRO 1 UNITS: 100 INJECTION, SOLUTION INTRAVENOUS; SUBCUTANEOUS at 16:55

## 2022-10-23 RX ADMIN — POTASSIUM CHLORIDE AND SODIUM CHLORIDE 75 ML/HR: 900; 300 INJECTION, SOLUTION INTRAVENOUS at 05:56

## 2022-10-23 NOTE — PLAN OF CARE
Problem: Potential for Falls  Goal: Patient will remain free of falls  Description: INTERVENTIONS:  - Educate patient/family on patient safety including physical limitations  - Instruct patient to call for assistance with activity   - Consult OT/PT to assist with strengthening/mobility   - Keep Call bell within reach  - Keep bed low and locked with side rails adjusted as appropriate  - Keep care items and personal belongings within reach  - Initiate and maintain comfort rounds  - Make Fall Risk Sign visible to staff  - Offer Toileting every 2 Hours, in advance of need  - Initiate/Maintain Bed alarm  - Apply yellow socks and bracelet for high fall risk patients  - Consider moving patient to room near nurses station  Outcome: Progressing     Problem: MOBILITY - ADULT  Goal: Maintain or return to baseline ADL function  Description: INTERVENTIONS:  -  Assess patient's ability to carry out ADLs; assess patient's baseline for ADL function and identify physical deficits which impact ability to perform ADLs (bathing, care of mouth/teeth, toileting, grooming, dressing, etc )  - Assess/evaluate cause of self-care deficits   - Assess range of motion  - Assess patient's mobility; develop plan if impaired  - Assess patient's need for assistive devices and provide as appropriate  - Encourage maximum independence but intervene and supervise when necessary  - Involve family in performance of ADLs  - Assess for home care needs following discharge   - Consider OT consult to assist with ADL evaluation and planning for discharge  - Provide patient education as appropriate  Outcome: Progressing  Goal: Maintains/Returns to pre admission functional level  Description: INTERVENTIONS:  - Perform BMAT or MOVE assessment daily    - Set and communicate daily mobility goal to care team and patient/family/caregiver  - Collaborate with rehabilitation services on mobility goals if consulted  - Perform Range of Motion 3 times a day    - Reposition patient every 2 hours  - Dangle patient 3 times a day  - Stand patient 3 times a day  - Ambulate patient 3 times a day  - Out of bed to chair 3 times a day   - Out of bed for meals 3 times a day  - Out of bed for toileting  - Record patient progress and toleration of activity level   Outcome: Progressing     Problem: MOBILITY - ADULT  Goal: Maintain or return to baseline ADL function  Description: INTERVENTIONS:  -  Assess patient's ability to carry out ADLs; assess patient's baseline for ADL function and identify physical deficits which impact ability to perform ADLs (bathing, care of mouth/teeth, toileting, grooming, dressing, etc )  - Assess/evaluate cause of self-care deficits   - Assess range of motion  - Assess patient's mobility; develop plan if impaired  - Assess patient's need for assistive devices and provide as appropriate  - Encourage maximum independence but intervene and supervise when necessary  - Involve family in performance of ADLs  - Assess for home care needs following discharge   - Consider OT consult to assist with ADL evaluation and planning for discharge  - Provide patient education as appropriate  Outcome: Progressing  Goal: Maintains/Returns to pre admission functional level  Description: INTERVENTIONS:  - Perform BMAT or MOVE assessment daily    - Set and communicate daily mobility goal to care team and patient/family/caregiver  - Collaborate with rehabilitation services on mobility goals if consulted  - Perform Range of Motion 3 times a day  - Reposition patient every 2 hours    - Dangle patient 3 times a day  - Stand patient 3 times a day  - Ambulate patient 3 times a day  - Out of bed to chair 3 times a day   - Out of bed for meals 3 times a day  - Out of bed for toileting  - Record patient progress and toleration of activity level   Outcome: Progressing     Problem: PAIN - ADULT  Goal: Verbalizes/displays adequate comfort level or baseline comfort level  Description: Interventions:  - Encourage patient to monitor pain and request assistance  - Assess pain using appropriate pain scale  - Administer analgesics based on type and severity of pain and evaluate response  - Implement non-pharmacological measures as appropriate and evaluate response  - Consider cultural and social influences on pain and pain management  - Notify physician/advanced practitioner if interventions unsuccessful or patient reports new pain  Outcome: Progressing     Problem: INFECTION - ADULT  Goal: Absence or prevention of progression during hospitalization  Description: INTERVENTIONS:  - Assess and monitor for signs and symptoms of infection  - Monitor lab/diagnostic results  - Monitor all insertion sites, i e  indwelling lines, tubes, and drains  - Monitor endotracheal if appropriate and nasal secretions for changes in amount and color  - [place] appropriate cooling/warming therapies per order  - Administer medications as ordered  - Instruct and encourage patient and family to use good hand hygiene technique  - Identify and instruct in appropriate isolation precautions for identified infection/condition  Outcome: Progressing  Goal: Absence of fever/infection during neutropenic period  Description: INTERVENTIONS:  - Monitor WBC    Outcome: Progressing     Problem: SAFETY ADULT  Goal: Patient will remain free of falls  Description: INTERVENTIONS:  - Educate patient/family on patient safety including physical limitations  - Instruct patient to call for assistance with activity   - Consult OT/PT to assist with strengthening/mobility   - Keep Call bell within reach  - Keep bed low and locked with side rails adjusted as appropriate  - Keep care items and personal belongings within reach  - Initiate and maintain comfort rounds  - Make Fall Risk Sign visible to staff  - Offer Toileting every 2 Hours, in advance of need  - Initiate/Maintain Bed alarm  - Apply yellow socks and bracelet for high fall risk patients  - Consider moving patient to room near nurses station  Outcome: Progressing  Goal: Maintain or return to baseline ADL function  Description: INTERVENTIONS:  -  Assess patient's ability to carry out ADLs; assess patient's baseline for ADL function and identify physical deficits which impact ability to perform ADLs (bathing, care of mouth/teeth, toileting, grooming, dressing, etc )  - Assess/evaluate cause of self-care deficits   - Assess range of motion  - Assess patient's mobility; develop plan if impaired  - Assess patient's need for assistive devices and provide as appropriate  - Encourage maximum independence but intervene and supervise when necessary  - Involve family in performance of ADLs  - Assess for home care needs following discharge   - Consider OT consult to assist with ADL evaluation and planning for discharge  - Provide patient education as appropriate  Outcome: Progressing  Goal: Maintains/Returns to pre admission functional level  Description: INTERVENTIONS:  - Perform BMAT or MOVE assessment daily    - Set and communicate daily mobility goal to care team and patient/family/caregiver  - Collaborate with rehabilitation services on mobility goals if consulted  - Perform Range of Motion 3 times a day  - Reposition patient every 2 hours    - Dangle patient 3 times a day  - Stand patient 3 times a day  - Ambulate patient 3 times a day  - Out of bed to chair 3 times a day   - Out of bed for meals 3 times a day  - Out of bed for toileting  - Record patient progress and toleration of activity level   Outcome: Progressing     Problem: DISCHARGE PLANNING  Goal: Discharge to home or other facility with appropriate resources  Description: INTERVENTIONS:  - Identify barriers to discharge w/patient and caregiver  - Arrange for needed discharge resources and transportation as appropriate  - Identify discharge learning needs (meds, wound care, etc )  - Arrange for interpretive services to assist at discharge as needed  - Refer to Case Management Department for coordinating discharge planning if the patient needs post-hospital services based on physician/advanced practitioner order or complex needs related to functional status, cognitive ability, or social support system  Outcome: Progressing     Problem: Knowledge Deficit  Goal: Patient/family/caregiver demonstrates understanding of disease process, treatment plan, medications, and discharge instructions  Description: Complete learning assessment and assess knowledge base    Interventions:  - Provide teaching at level of understanding  - Provide teaching via preferred learning methods  Outcome: Progressing     Problem: METABOLIC, FLUID AND ELECTROLYTES - ADULT  Goal: Electrolytes maintained within normal limits  Description: INTERVENTIONS:  - Monitor labs and assess patient for signs and symptoms of electrolyte imbalances  - Administer electrolyte replacement as ordered  - Monitor response to electrolyte replacements, including repeat lab results as appropriate  - Instruct patient on fluid and nutrition as appropriate  Outcome: Progressing  Goal: Fluid balance maintained  Description: INTERVENTIONS:  - Monitor labs   - Monitor I/O and WT  - Instruct patient on fluid and nutrition as appropriate  - Assess for signs & symptoms of volume excess or deficit  Outcome: Progressing  Goal: Glucose maintained within target range  Description: INTERVENTIONS:  - Monitor Blood Glucose as ordered  - Assess for signs and symptoms of hyperglycemia and hypoglycemia  - Administer ordered medications to maintain glucose within target range  - Assess nutritional intake and initiate nutrition service referral as needed  Outcome: Progressing     Problem: SKIN/TISSUE INTEGRITY - ADULT  Goal: Skin Integrity remains intact(Skin Breakdown Prevention)  Description: Assess:  -Perform [place] assessment every 12hrs   -Clean and moisturize skin every 12hrs   -Inspect skin when repositioning, toileting, and assisting with ADLS  -Assess extremities for adequate circulation and sensation     Bed Management:  -Have minimal linens on bed & keep smooth, unwrinkled  -Change linens as needed when moist or perspiring  -Avoid sitting or lying in one position for more than 2 hours while in bed  -Keep HOB at 45degrees     Toileting:  -Offer bedside commode  -Assess for incontinence every 2hrs     Activity:  -Mobilize patient 3 times a day  -Encourage activity and walks on unit  -Encourage or provide ROM exercises   -Turn and reposition patient every 2 Hours  -Use appropriate equipment to lift or move patient in bed  -Instruct/ Assist with weight shifting every 1hrs  when out of bed in chair  -Consider limitation of chair time 2 hour intervals    Skin Care:  -Avoid use of baby powder, tape, friction and shearing, hot water or constrictive clothing  -Relieve pressure over bony prominences using cushion  -Do not massage red bony areas    Outcome: Progressing  Goal: Incision(s), wounds(s) or drain site(s) healing without S/S of infection  Description: INTERVENTIONS  - Assess and document dressing, incision, wound bed, drain sites and surrounding tissue  - Provide patient and family education  - Perform skin care/dressing changes every 12hrs  Outcome: Progressing       Problem: MUSCULOSKELETAL - ADULT  Goal: Maintain or return mobility to safest level of function  Description: INTERVENTIONS:  - Assess patient's ability to carry out ADLs; assess patient's baseline for ADL function and identify physical deficits which impact ability to perform ADLs (bathing, care of mouth/teeth, toileting, grooming, dressing, etc )  - Assess/evaluate cause of self-care deficits   - Assess range of motion  - Assess patient's mobility  - Assess patient's need for assistive devices and provide as appropriate  - Encourage maximum independence but intervene and supervise when necessary  - Involve family in performance of ADLs  - Assess for home care needs following discharge   - Consider OT consult to assist with ADL evaluation and planning for discharge  - Provide patient education as appropriate  Outcome: Progressing  Goal: Maintain proper alignment of affected body part  Description: INTERVENTIONS:  - Support, maintain and protect limb and body alignment  - Provide patient/ family with appropriate education  Outcome: Progressing

## 2022-10-23 NOTE — ASSESSMENT & PLAN NOTE
· Secondary to vomiting    Improved with repletion  · Continue NSS+KCL and supplements    Results from last 7 days   Lab Units 10/23/22  0535 10/22/22  1420 10/22/22  0502 10/21/22  2108   POTASSIUM mmol/L 2 9* 2 8* 2 4* 2 4*

## 2022-10-23 NOTE — ASSESSMENT & PLAN NOTE
· Likely secondary to rationing of medications  · Toprol all changed to carvedilol with good response    · Continue lisinopril 40 mg urine amlodipine 10 mg

## 2022-10-23 NOTE — ASSESSMENT & PLAN NOTE
Lab Results   Component Value Date    HGBA1C 9 4 (A) 03/16/2022     Recent Labs     10/22/22  1611 10/22/22  2125 10/23/22  0637 10/23/22  1128   POCGLU 218* 165* 231* 161*     · Has been rationing glargine due to cost  · Glargine restarted at 60 units  Continue sliding scale

## 2022-10-23 NOTE — PROGRESS NOTES
Progress Note - Podiatry  Adri Man 65 y o  female MRN: [id number]  Unit/Bed#: E2 -01 Encounter: [id number]    ASSESSMENT:  1  Diabetic ulcer left heel  2  Eschar / ischemic change right great toe and 5th toe  3  Type II diabetes with PAD      PLAN:  1  Left heel ulcer/ eschar is stable  Awaits arterial study  Discussed treatment options / plan  2  Serial exam   Will need vascular surgery pending arterial study  3  Continue antibiotics / local care for now  Imaging: I have personally reviewed pertinent films in PACS  EKG, Pathology, labs, and Other Studies: I have personally reviewed pertinent reports  Subjective/Objective   Chief Complaint:   Chief Complaint   Patient presents with   • Wound Check     Referred to ed by pcp for wound on left heel  Pt reports vomiting  Denies fever  Hx of diabetes  Not checking blood glucose at home  Not taking medications as prescribed  Subjective: 65 y o  y/o female was seen and evaluated at bedside  No acute event overnight  Pain presents left foot  Review of Systems  Review of Systems   Constitutional: Negative for chills and fever  Respiratory: Negative for shortness of breath  Cardiovascular: Negative for chest pain  Gastrointestinal: Negative for nausea and vomiting  Skin: Positive for wound  Past Medical History  Past Medical History:   Diagnosis Date   • Anxiety    • Depression    • Diabetes ([facility] [address] )    • Diabetes mellitus ([facility] [address] )    • Esophageal reflux     28 APR 2015 RESOLVED   • Hyperlipidemia    • Hypertension    • Low back pain     sciatica   • Pneumonia 2019   • Stroke ([facility] [address] ) 12/2015    small vessel, L frontal corona radiata  Rx asa 81, Lipitor 40, risk modification   • Vitamin D deficiency        Past Surgical History  Past Surgical History:   Procedure Laterality Date   • COLONOSCOPY          Allergies:  Patient has no known allergies      Medications:  Current Facility-Administered Medications   Medication Dose Route Frequency Provider Last Rate Last Admin   • acetaminophen (TYLENOL) tablet 650 mg  650 mg Oral Q6H PRN Sugey Wiseman PA-C   650 mg at 10/23/22 1015   • ALPRAZolam (XANAX) tablet 0 25 mg  0 25 mg Oral Daily PRN Sugey Wiseman PA-C       • amLODIPine (NORVASC) tablet 10 mg  10 mg Oral Daily Sugey Wiseman PA-C   10 mg at 10/23/22 0815   • aspirin chewable tablet 81 mg  81 mg Oral Daily Sugey Wiseman PA-C   81 mg at 10/23/22 0815   • carvedilol (COREG) tablet 12 5 mg  12 5 mg Oral BID With Meals Patel Duncan DO   12 5 mg at 10/23/22 0815   • gabapentin (NEURONTIN) capsule 200 mg  200 mg Oral BID Sugey Wiseman PA-C   200 mg at 10/23/22 0815   • heparin (porcine) subcutaneous injection 5,000 Units  5,000 Units Subcutaneous [facility] Sugey Wiseman PA-C   5,000 Units at 10/23/22 5097   • hydrALAZINE (APRESOLINE) injection 10 mg  10 mg Intravenous Q6H PRN Sugey Wiseman PA-C   10 mg at 10/22/22 1700   • insulin glargine (LANTUS) subcutaneous injection 60 Units 0 6 mL  60 Units Subcutaneous HS Sugey Wiseman PA-C   60 Units at 10/22/22 2227   • insulin lispro (HumaLOG) 100 units/mL subcutaneous injection 1-6 Units  1-6 Units Subcutaneous 4x Daily (AC & HS) Ayaan Chan PA-C   2 Units at 10/23/22 0814   • lisinopril (ZESTRIL) tablet 40 mg  40 mg Oral Daily Sugey Wiseman PA-C   40 mg at 10/23/22 0815   • metoclopramide (REGLAN) injection 5 mg  5 mg Intravenous Q6H PRN Daphney Angelucci, DO   5 mg at 10/23/22 4135   • morphine injection 4 mg  4 mg Intravenous Q4H PRN Juleehney Angelucci, DO       • ondansetron (ZOFRAN) injection 4 mg  4 mg Intravenous Q6H PRN Sugey Wiseman PA-C   4 mg at 10/22/22 2241   • oxyCODONE (ROXICODONE) IR tablet 5 mg  5 mg Oral Q4H PRN Daphney Angelucci, DO       • piperacillin-tazobactam (ZOSYN) 3 375 g in sodium chloride 0 9 % 100 mL IVPB  3 375 g Intravenous Q6H Sugey Wiseman PA-C 200 mL/hr at 10/23/22 1015 3 375 g at 10/23/22 1015   • potassium chloride 20 mEq IVPB (premix)  20 mEq Intravenous Once Leveda KETURAH Orr   Held at 10/22/22 2156   • sertraline (ZOLOFT) tablet 100 mg  100 mg Oral Daily Sugey Wiseman PA-C   100 mg at 10/23/22 7272   • sodium chloride 0 9 % with KCl 40 mEq/L infusion (premix)  75 mL/hr Intravenous Continuous Esha DO Yobany 75 mL/hr at 10/23/22 0556 75 mL/hr at 10/23/22 0556       Social History:  Social History     Socioeconomic History   • Marital status: Single     Spouse name: None   • Number of children: None   • Years of education: None   • Highest education level: None   Occupational History   • None   Tobacco Use   • Smoking status: Former Smoker     Types: Cigarettes     Quit date:      Years since quittin 8   • Smokeless tobacco: Never Used   Vaping Use   • Vaping Use: Never used   Substance and Sexual Activity   • Alcohol use: No     Comment: OCCASIONAL ALCOHOL USE IN ALL SCRIPTS   • Drug use: No   • Sexual activity: None   Other Topics Concern   • None   Social History Narrative   • None     Social Determinants of Health     Financial Resource Strain: Not on file   Food Insecurity: Not on file   Transportation Needs: Not on file   Physical Activity: Not on file   Stress: Not on file   Social Connections: Not on file   Intimate Partner Violence: Not on file   Housing Stability: Not on file          Blood pressure 131/68, pulse 89, temperature 97 8 °F (36 6 °C), temperature source Temporal, resp  rate 18, SpO2 93 %, not currently breastfeeding  ,There is no height or weight on file to calculate BMI  Invasive Devices  Report    Peripheral Intravenous Line  Duration           Peripheral IV 10/21/22 Left Wrist 1 day    Peripheral IV 10/21/22 Right Antecubital 1 day                Physical Exam:   General: Afebrile, cooperative and no distress  Lungs: Non labored breathing  Heart: RRR  Normal heart rate  Abdomen: Soft, non-tender  Neurologic:  No focal neurologic deficit    Sensation is intact to light touch  Extremity: No calf pain  MMT intact  ROM intact  Mild LE edema noted  Skin:  Large, dry eschar left heel  No surrounding cellulitis  No purulence  Ischemic change right 5th toe  Eschar right great toe  Vascular: Non - palpable pedal pulses

## 2022-10-23 NOTE — ASSESSMENT & PLAN NOTE
· Currently being followed by podiatry    Arterial duplex ordered to determine further interventions  · Remains on zosyn

## 2022-10-24 ENCOUNTER — APPOINTMENT (INPATIENT)
Dept: NON INVASIVE DIAGNOSTICS | Facility: HOSPITAL | Age: 62
DRG: 853 | End: 2022-10-24
Payer: COMMERCIAL

## 2022-10-24 LAB
ALBUMIN SERPL BCP-MCNC: 2.2 G/DL (ref 3.5–5)
ALP SERPL-CCNC: 100 U/L (ref 46–116)
ALT SERPL W P-5'-P-CCNC: 26 U/L (ref 12–78)
ANION GAP SERPL CALCULATED.3IONS-SCNC: 8 MMOL/L (ref 4–13)
AST SERPL W P-5'-P-CCNC: 39 U/L (ref 5–45)
BACTERIA UR CULT: ABNORMAL
BILIRUB SERPL-MCNC: 0.24 MG/DL (ref 0.2–1)
BUN SERPL-MCNC: 19 MG/DL (ref 5–25)
CALCIUM ALBUM COR SERPL-MCNC: 10.1 MG/DL (ref 8.3–10.1)
CALCIUM SERPL-MCNC: 8.7 MG/DL (ref 8.3–10.1)
CHLORIDE SERPL-SCNC: 105 MMOL/L (ref 96–108)
CO2 SERPL-SCNC: 28 MMOL/L (ref 21–32)
CREAT SERPL-MCNC: 1.44 MG/DL (ref 0.6–1.3)
ERYTHROCYTE [DISTWIDTH] IN BLOOD BY AUTOMATED COUNT: 14.7 % (ref 11.6–15.1)
GFR SERPL CREATININE-BSD FRML MDRD: 39 ML/MIN/1.73SQ M
GLUCOSE SERPL-MCNC: 148 MG/DL (ref 65–140)
GLUCOSE SERPL-MCNC: 165 MG/DL (ref 65–140)
GLUCOSE SERPL-MCNC: 177 MG/DL (ref 65–140)
GLUCOSE SERPL-MCNC: 178 MG/DL (ref 65–140)
GLUCOSE SERPL-MCNC: 86 MG/DL (ref 65–140)
HCT VFR BLD AUTO: 28.9 % (ref 34.8–46.1)
HGB BLD-MCNC: 9 G/DL (ref 11.5–15.4)
MAGNESIUM SERPL-MCNC: 2 MG/DL (ref 1.6–2.6)
MCH RBC QN AUTO: 27.5 PG (ref 26.8–34.3)
MCHC RBC AUTO-ENTMCNC: 31.1 G/DL (ref 31.4–37.4)
MCV RBC AUTO: 88 FL (ref 82–98)
PLATELET # BLD AUTO: 332 THOUSANDS/UL (ref 149–390)
PMV BLD AUTO: 9.2 FL (ref 8.9–12.7)
POTASSIUM SERPL-SCNC: 2.8 MMOL/L (ref 3.5–5.3)
PROT SERPL-MCNC: 6.6 G/DL (ref 6.4–8.4)
RBC # BLD AUTO: 3.27 MILLION/UL (ref 3.81–5.12)
SODIUM SERPL-SCNC: 141 MMOL/L (ref 135–147)
WBC # BLD AUTO: 12.61 THOUSAND/UL (ref 4.31–10.16)

## 2022-10-24 PROCEDURE — 80053 COMPREHEN METABOLIC PANEL: CPT | Performed by: INTERNAL MEDICINE

## 2022-10-24 PROCEDURE — 93925 LOWER EXTREMITY STUDY: CPT | Performed by: SURGERY

## 2022-10-24 PROCEDURE — 82948 REAGENT STRIP/BLOOD GLUCOSE: CPT

## 2022-10-24 PROCEDURE — 93923 UPR/LXTR ART STDY 3+ LVLS: CPT | Performed by: SURGERY

## 2022-10-24 PROCEDURE — 93925 LOWER EXTREMITY STUDY: CPT

## 2022-10-24 PROCEDURE — 93923 UPR/LXTR ART STDY 3+ LVLS: CPT

## 2022-10-24 PROCEDURE — 99233 SBSQ HOSP IP/OBS HIGH 50: CPT | Performed by: FAMILY MEDICINE

## 2022-10-24 PROCEDURE — 85027 COMPLETE CBC AUTOMATED: CPT | Performed by: INTERNAL MEDICINE

## 2022-10-24 PROCEDURE — 83735 ASSAY OF MAGNESIUM: CPT | Performed by: FAMILY MEDICINE

## 2022-10-24 RX ORDER — POTASSIUM CHLORIDE 20 MEQ/1
40 TABLET, EXTENDED RELEASE ORAL
Status: COMPLETED | OUTPATIENT
Start: 2022-10-24 | End: 2022-10-25

## 2022-10-24 RX ORDER — INSULIN LISPRO 100 [IU]/ML
5 INJECTION, SOLUTION INTRAVENOUS; SUBCUTANEOUS
Status: DISCONTINUED | OUTPATIENT
Start: 2022-10-24 | End: 2022-10-28

## 2022-10-24 RX ADMIN — INSULIN LISPRO 5 UNITS: 100 INJECTION, SOLUTION INTRAVENOUS; SUBCUTANEOUS at 16:26

## 2022-10-24 RX ADMIN — PIPERACILLIN AND TAZOBACTAM 3.38 G: 3; .375 INJECTION, POWDER, LYOPHILIZED, FOR SOLUTION INTRAVENOUS at 04:41

## 2022-10-24 RX ADMIN — OXYCODONE HYDROCHLORIDE 5 MG: 5 TABLET ORAL at 05:19

## 2022-10-24 RX ADMIN — MORPHINE SULFATE 4 MG: 4 INJECTION INTRAVENOUS at 10:56

## 2022-10-24 RX ADMIN — AMLODIPINE BESYLATE 10 MG: 10 TABLET ORAL at 09:00

## 2022-10-24 RX ADMIN — INSULIN LISPRO 1 UNITS: 100 INJECTION, SOLUTION INTRAVENOUS; SUBCUTANEOUS at 07:31

## 2022-10-24 RX ADMIN — CARVEDILOL 12.5 MG: 12.5 TABLET, FILM COATED ORAL at 16:26

## 2022-10-24 RX ADMIN — SERTRALINE HYDROCHLORIDE 100 MG: 100 TABLET ORAL at 09:00

## 2022-10-24 RX ADMIN — PIPERACILLIN AND TAZOBACTAM 3.38 G: 3; .375 INJECTION, POWDER, LYOPHILIZED, FOR SOLUTION INTRAVENOUS at 16:26

## 2022-10-24 RX ADMIN — INSULIN LISPRO 5 UNITS: 100 INJECTION, SOLUTION INTRAVENOUS; SUBCUTANEOUS at 12:56

## 2022-10-24 RX ADMIN — GABAPENTIN 200 MG: 100 CAPSULE ORAL at 17:44

## 2022-10-24 RX ADMIN — POTASSIUM CHLORIDE 40 MEQ: 1500 TABLET, EXTENDED RELEASE ORAL at 12:55

## 2022-10-24 RX ADMIN — INSULIN LISPRO 1 UNITS: 100 INJECTION, SOLUTION INTRAVENOUS; SUBCUTANEOUS at 22:49

## 2022-10-24 RX ADMIN — INSULIN GLARGINE 60 UNITS: 100 INJECTION, SOLUTION SUBCUTANEOUS at 22:49

## 2022-10-24 RX ADMIN — INSULIN LISPRO 1 UNITS: 100 INJECTION, SOLUTION INTRAVENOUS; SUBCUTANEOUS at 11:06

## 2022-10-24 RX ADMIN — PIPERACILLIN AND TAZOBACTAM 3.38 G: 3; .375 INJECTION, POWDER, LYOPHILIZED, FOR SOLUTION INTRAVENOUS at 22:49

## 2022-10-24 RX ADMIN — POTASSIUM CHLORIDE 40 MEQ: 1500 TABLET, EXTENDED RELEASE ORAL at 16:26

## 2022-10-24 RX ADMIN — ALPRAZOLAM 0.25 MG: 0.25 TABLET ORAL at 22:50

## 2022-10-24 RX ADMIN — HEPARIN SODIUM 5000 UNITS: 5000 INJECTION INTRAVENOUS; SUBCUTANEOUS at 22:49

## 2022-10-24 RX ADMIN — HEPARIN SODIUM 5000 UNITS: 5000 INJECTION INTRAVENOUS; SUBCUTANEOUS at 05:19

## 2022-10-24 RX ADMIN — OXYCODONE HYDROCHLORIDE 5 MG: 5 TABLET ORAL at 17:44

## 2022-10-24 RX ADMIN — GABAPENTIN 200 MG: 100 CAPSULE ORAL at 09:00

## 2022-10-24 RX ADMIN — HEPARIN SODIUM 5000 UNITS: 5000 INJECTION INTRAVENOUS; SUBCUTANEOUS at 13:28

## 2022-10-24 RX ADMIN — OXYCODONE HYDROCHLORIDE 5 MG: 5 TABLET ORAL at 09:02

## 2022-10-24 RX ADMIN — PIPERACILLIN AND TAZOBACTAM 3.38 G: 3; .375 INJECTION, POWDER, LYOPHILIZED, FOR SOLUTION INTRAVENOUS at 10:35

## 2022-10-24 RX ADMIN — CARVEDILOL 12.5 MG: 12.5 TABLET, FILM COATED ORAL at 07:32

## 2022-10-24 RX ADMIN — ASPIRIN 81 MG CHEWABLE TABLET 81 MG: 81 TABLET CHEWABLE at 09:00

## 2022-10-24 RX ADMIN — MORPHINE SULFATE 4 MG: 4 INJECTION INTRAVENOUS at 19:52

## 2022-10-24 RX ADMIN — OXYCODONE HYDROCHLORIDE 5 MG: 5 TABLET ORAL at 01:44

## 2022-10-24 NOTE — ASSESSMENT & PLAN NOTE
Lab Results   Component Value Date    HGBA1C 9 4 (A) 03/16/2022     Recent Labs     10/23/22  1521 10/23/22  2125 10/24/22  0716 10/24/22  1104   POCGLU 198* 197* 165* 178*     · Uncontrolled based on prior A1C, will update  · Apparently has been rationing glargine due to cost  · Glargine restarted at 60 units with blood glucose mildly above goal  Add pre-meal Humalog 5 units TID  Continue sliding scale

## 2022-10-24 NOTE — PROGRESS NOTES
[facility]  Progress Note - [address] [facility] [address] 1960, 65 y o  female MRN: [id number]  Unit/Bed#: E2 -01 Encounter: [id number]  Primary Care Provider: ALEX Delatorre Si   Date and time admitted to hospital: 10/21/2022  7:58 PM    * Non healing left heel wound  Assessment & Plan  · In setting of uncontrolled diabetes   · Podiatry input appreciated, will determine further intervention based on arterial duplex done today, report pending  · Remains on Zosyn    Sepsis [facility])  Assessment & Plan  Present on admission with Leucocytosis and tachycardia  Due to left foot wound  Resolved  Continued on IV Zosyn  F/u blood cultures, no growth 48 hours      UTI (urinary tract infection)  Assessment & Plan  · Preliminary urine cultures with gram negative rods, final cultures pending  Reports improvement in symptoms  Remains on Zosyn for foot infection    Hypokalemia  Assessment & Plan  · Secondary to vomiting which has now resolved  · Continue replacement and monitoring     Results from last 7 days   Lab Units 10/24/22  0437 10/23/22  0535 10/22/22  1420 10/22/22  0502 10/21/22  2108   POTASSIUM mmol/L 2 8* 2 9* 2 8* 2 4* 2 4*       Hypertensive urgency  Assessment & Plan  · Felt to be due to patient underdosing her meds because of cost   · Toprol changed to carvedilol with good response    · Continue lisinopril 40 mg and amlodipine 10 mg daily    N&V (nausea and vomiting)  Assessment & Plan  Resolved  Continue supportive care  Monitor electrolytes     Type 2 diabetes mellitus with hyperglycemia, with long-term current use of insulin [facility])  Assessment & Plan  Lab Results   Component Value Date    HGBA1C 9 4 (A) 03/16/2022     Recent Labs     10/23/22  1521 10/23/22  2125 10/24/22  0716 10/24/22  1104   POCGLU 198* 197* 165* 178*     · Uncontrolled based on prior A1C, will update  · Apparently has been rationing glargine due to cost  · Glargine restarted at 60 units with blood glucose mildly above goal  Add pre-meal Humalog 5 units TID  Continue sliding scale  Depression, recurrent ([address] )  Assessment & Plan  · Controlled  · Continue sertraline, Xanax PRN        VTE Pharmacologic Prophylaxis: VTE Score: 4 Moderate Risk (Score 3-4) - Pharmacological DVT Prophylaxis Ordered: heparin  Patient Centered Rounds: I performed bedside rounds with nursing staff today  Discussions with Specialists or Other Care Team Provider: will d/w Podiatry     Education and Discussions with Family / Patient: Patient   Time Spent for Care: 45 minutes  More than 50% of total time spent on counseling and coordination of care as described above  Current Length of Stay: 2 day(s)  Current Patient Status: Inpatient   Certification Statement: The patient will continue to require additional inpatient hospital stay due to IV Rx, close monitoring   Discharge Plan: Anticipate discharge in >72 hrs to rehab facility  Code Status: Level 1 - Full Code    Subjective:   Patient seen and examined  She states her foot pain is controlled  Denies n/v/dysuria  No o/n events  Objective:     Vitals:   Temp (24hrs), Av 7 °F (36 5 °C), Min:97 5 °F (36 4 °C), Max:97 9 °F (36 6 °C)    Temp:  [97 5 °F (36 4 °C)-97 9 °F (36 6 °C)] 97 9 °F (36 6 °C)  HR:  [78-84] 78  Resp:  [16-20] 16  BP: (120-155)/(55-71) 124/63  SpO2:  [94 %-98 %] 94 %  There is no height or weight on file to calculate BMI  Input and Output Summary (last 24 hours): Intake/Output Summary (Last 24 hours) at 10/24/2022 1222  Last data filed at 10/24/2022 0601  Gross per 24 hour   Intake 3093 75 ml   Output --   Net 3093 75 ml       Physical Exam:   Physical Exam  Constitutional:       General: She is not in acute distress  HENT:      Head: Normocephalic and atraumatic  Mouth/Throat:      Pharynx: Oropharynx is clear  Eyes:      Conjunctiva/sclera: Conjunctivae normal    Cardiovascular:      Rate and Rhythm: Normal rate and regular rhythm        Heart sounds: No murmur heard  Pulmonary:      Effort: No respiratory distress  Breath sounds: No wheezing or rales  Abdominal:      General: There is no distension  Tenderness: There is no abdominal tenderness  There is no guarding  Musculoskeletal:      Comments: Foot dressing intact    Neurological:      Mental Status: She is oriented to person, place, and time  Psychiatric:         Mood and Affect: Mood normal           Additional Data:     Labs:  Results from last 7 days   Lab Units 10/24/22  0437 10/23/22  0706   WBC Thousand/uL 12 61* 14 91*   HEMOGLOBIN g/dL 9 0* 9 5*   HEMATOCRIT % 28 9* 29 3*   PLATELETS Thousands/uL 332 338   NEUTROS PCT %  --  84*   LYMPHS PCT %  --  11*   MONOS PCT %  --  4   EOS PCT %  --  1     Results from last 7 days   Lab Units 10/24/22  0437   SODIUM mmol/L 141   POTASSIUM mmol/L 2 8*   CHLORIDE mmol/L 105   CO2 mmol/L 28   BUN mg/dL 19   CREATININE mg/dL 1 44*   ANION GAP mmol/L 8   CALCIUM mg/dL 8 7   ALBUMIN g/dL 2 2*   TOTAL BILIRUBIN mg/dL 0 24   ALK PHOS U/L 100   ALT U/L 26   AST U/L 39   GLUCOSE RANDOM mg/dL 86     Results from last 7 days   Lab Units 10/21/22  2108   INR  1 00     Results from last 7 days   Lab Units 10/24/22  1104 10/24/22  0716 10/23/22  2125 10/23/22  1521 10/23/22  1128 10/23/22  0637 10/22/22  2125 10/22/22  1611 10/22/22  1104 10/22/22  0942 10/21/22  2009 10/21/22  1630   POC GLUCOSE mg/dl 178* 165* 197* 198* 161* 231* 165* 218* 312* 328* 165* 303*         Results from last 7 days   Lab Units 10/21/22  2108   LACTIC ACID mmol/L 0 5   PROCALCITONIN ng/ml 0 06       Lines/Drains:  Invasive Devices  Report    Peripheral Intravenous Line  Duration           Peripheral IV 10/24/22 Left;Ventral (anterior) Forearm <1 day                Imaging: will review arterial duplex     Recent Cultures (last 7 days):   Results from last 7 days   Lab Units 10/21/22  2128 10/21/22  2108 10/21/22  2107   BLOOD CULTURE   --  No Growth at 48 hrs   No Growth at 48 hrs  URINE CULTURE  >100,000 cfu/ml Escherichia coli*  --   --        Last 24 Hours Medication List:   Current Facility-Administered Medications   Medication Dose Route Frequency Provider Last Rate   • acetaminophen  650 mg Oral Q6H PRN Sugey Wiseman PA-C     • ALPRAZolam  0 25 mg Oral Daily PRN Sugey Wiseman PA-C     • amLODIPine  10 mg Oral Daily Sugey Wiseman PA-C     • aspirin  81 mg Oral Daily Sugey Wiseman PA-C     • carvedilol  12 5 mg Oral BID With Meals Sherry Booker DO     • gabapentin  200 mg Oral BID Sugey Wiseman PA-C     • heparin (porcine)  5,000 Units Subcutaneous Q8H [facility] Sugey Wiseman PA-C     • hydrALAZINE  10 mg Intravenous Q6H PRN Sugey Wiseman PA-C     • insulin glargine  60 Units Subcutaneous HS Sugey Wiseman PA-C     • insulin lispro  1-6 Units Subcutaneous 4x Daily (AC & HS) Sugey Wiseman PA-C     • insulin lispro  5 Units Subcutaneous TID With Meals Amy Ivy MD     • metoclopramide  5 mg Intravenous Q6H PRN Sherry Booker DO     • morphine injection  4 mg Intravenous Q4H PRN Sherry Booker DO     • ondansetron  4 mg Intravenous Q6H PRN Sugey Wiseman PA-C     • oxyCODONE  5 mg Oral Q4H PRN Sherry Booker DO     • piperacillin-tazobactam  3 375 g Intravenous Q6H Sugey Wiseman PA-C Stopped (10/24/22 1138)   • potassium chloride  40 mEq Oral TID With Meals Amy Parry MD     • potassium chloride  20 mEq Intravenous Once [employer] Stopped (10/22/22 4375)   • sertraline  100 mg Oral Daily Sugey Wiseman PA-C          Today, Patient Was Seen By: Jefe Crane    **Please Note: This note may have been constructed using a voice recognition system  **

## 2022-10-24 NOTE — UTILIZATION REVIEW
Initial Clinical Review    Admission: Date/Time/Statement:   Admission Orders (From admission, onward)     Ordered        10/22/22 0212  INPATIENT ADMISSION  Once                      Orders Placed This Encounter   Procedures   • INPATIENT ADMISSION     Standing Status:   Standing     Number of Occurrences:   1     Order Specific Question:   Level of Care     Answer:   Med Surg [16]     Order Specific Question:   Estimated length of stay     Answer:   More than 2 Midnights     Order Specific Question:   Certification     Answer:   I certify that inpatient services are medically necessary for this patient for a duration of greater than two midnights  See H&P and MD Progress Notes for additional information about the patient's course of treatment  ED Arrival Information     Expected   10/21/2022     Arrival   10/21/2022 16:21    Acuity   Emergent            Means of arrival   Walk-In    Escorted by   Family Member    Service   Hospitalist    Admission type   Emergency            Arrival complaint   open sore on left foot/diabetic           Chief Complaint   Patient presents with   • Wound Check     Referred to ed by pcp for wound on left heel  Pt reports vomiting  Denies fever  Hx of diabetes  Not checking blood glucose at home  Not taking medications as prescribed  Initial Presentation: 65 y o  female  Referred to ED  By PCP with a left foot wound, has been ongoing  For  Awhile, initially  Was a  Small sore, continued  To worsen  Previously saw podiatry and PCP, thought it would  Improve  Was non compliant with recommendations  Put off seeking care due to mental health and financial issues  Saw PCP on  10/21, noted eschar and referred to ED  Had fevers at home earlier in the week, poor po intake and episodic nausea and vomiting   Has not been taking  Insulin as prescribed,   due to depression and kristina issues often stretching it out to once a day with short acting insulin and every other day for her long acting insulin  In ed she was evaluated w/concern for sepsis and left foot wound infection and admission was requested  PMH  Is  IDDm,  HTN, obesity and depression  Met Sepsis criteria   With  Leukocytosis and tachycardia, likely source left foot wound  CT scan concerning for soft tissue edema  EKG  Abnormal    Admit  Ip with  Non healing left heel wound, Hypertensive urgency, Abnormal EKG, Sepsis, Hypokalemia, nausea and vomiting and plan is  Podiatry consult,  IVF, monitor labs, blood cultures,  DYLLAN,  Antihypertensives, tele and monitor  BP        Podiatry consult ( 10/22)     Continue  Local wound care left heel  No evidence of osteo on imaging  Continue DYLLAN  Monitor labs and vital signs  Can be  WBAT  LLE  Date:   10/23       Day 2:   Continue  DYLLAN and IVF  Feels improved  Less nausea  Swelling  B/L LE  Noted  Waiting arterial studies  Continue local wound care         ED Triage Vitals   Temperature Pulse Respirations Blood Pressure SpO2   10/21/22 1629 10/21/22 1628 10/21/22 1628 10/21/22 1628 10/21/22 1628   98 3 °F (36 8 °C) 93 18 (!) 238/115 98 %      Temp Source Heart Rate Source Patient Position - Orthostatic VS BP Location FiO2 (%)   10/21/22 1629 10/21/22 1628 10/21/22 1628 10/21/22 1628 --   Oral Monitor Sitting Right arm       Pain Score       10/21/22 1629       4          Wt Readings from Last 1 Encounters:   10/21/22 98 4 kg (217 lb)     Additional Vital Signs:   10/23/22 2315 97 6 °F (36 4 °C) 79 20 155/71 110 98 % None (Room air) Sitting   10/23/22 1751 97 7 °F (36 5 °C) 84 18 120/55 80 97 % None (Room air) Sitting   10/23/22 1150 97 6 °F (36 4 °C) 86 18 125/67 90 97 % -- Sitting   10/23/22 0657 97 8 °F (36 6 °C) 89 18 131/68 -- 93 % -- Lying   10/23/22 0337 98 4 °F (36 9 °C) 92 18 -- -- 94 % -- --   10/22/22 2338 98 2 °F (36 8 °C) 92 18 141/65 -- 93 % -- Lying   10/22/22 2127 -- 95 -- 163/75 108 -- -- Lying   10/22/22 1927 97 9 °F (36 6 °C) 93 18 176/68 Abnormal  105 92 % None (Room air) Lying   10/22/22 1900 -- -- -- 140/64 -- -- -- --   10/22/22 1629 -- -- -- 214/83 Abnormal   123 -- -- Sitting   BP: RN aware at 10/22/22 1629   10/22/22 1628 98 6 °F (37 °C) 83 20 213/82 Abnormal  130 98 % None (Room air) Sitting   10/22/22 1141 97 1 °F (36 2 °C) Abnormal  86 17 168/74 106 93 % None (Room air) Lying   10/22/22 0859 96 4 °F (35 8 °C) Abnormal  88 16 187/81 Abnormal  116 98 % None (Room air) Lying   10/22/22 0815 -- 82 16 168/74 107 97 % None (Room air) Lying   10/22/22 0745 -- 82 14 169/74 106 93 % None (Room air) Sitting   10/22/22 0637 -- 87 16 180/75 Abnormal  -- 96 % None (Room air) Lying   10/22/22 0607 -- 93 16 193/82 Abnormal  -- 99 % None (Room air) Lying   10/22/22 0517 -- 80 14 234/105 Abnormal   -- 99 % None (Room air) Sitting   BP: Admitting PA aware of pt's B/P at 10/22/22 0517   10/22/22 0345 -- 80 14 198/87 Abnormal  125 94 % None (Room air) Lying   10/22/22 0230 -- 80 14 222/93 Abnormal  134 94 % None (Room air) Lying   10/22/22 0130 -- 82 18 226/95 Abnormal  136 93 % None (Room air) Lying   10/22/22 0100 -- 76 14 215/93 Abnormal  133 92 % None (Room air) Lying         Pertinent Labs/Diagnostic Test Results:   EKG   Ischemic changes inferolateral leads  CT lower extremity w contrast left   Final Result by Jose Martínez DO (10/22 0102)      No acute bony process is seen  Moderate superficial soft tissue edema in the foot, ankle, and leg, superimposed cellulitis considered in the appropriate clinical setting  Correlation with patient's symptoms and laboratory values recommended  No discrete drainable collection    identified  Other findings as above  Workstation performed: [id number]         XR chest 1 view portable   ED Interpretation by Chance Coulter PA-C (10/21 2273)   ED wet read:  Possible mild vascular congestion         Final Result by Raina Reinoso MD (54/36 )      No acute cardiopulmonary disease  Workstation performed: [id number]         XR foot 3+ views RIGHT   Final Result by Zaire Zavala MD (51/23 1439)      No acute osseous abnormality        Workstation performed: [id number]         VAS lower limb arterial duplex, complete bilateral    (Results Pending)     Results from last 7 days   Lab Units 10/21/22  2108   SARS-COV-2  Negative     Results from last 7 days   Lab Units 10/24/22  0437 10/23/22  0706 10/22/22  0502 10/21/22  2108   WBC Thousand/uL 12 61* 14 91* 13 57* 13 39*   HEMOGLOBIN g/dL 9 0* 9 5* 10 1* 10 1*   HEMATOCRIT % 28 9* 29 3* 30 7* 31 1*   PLATELETS Thousands/uL 332 338 334 351   NEUTROS ABS Thousands/µL  --  12 49* 9 69* 9 17*         Results from last 7 days   Lab Units 10/24/22  0437 10/23/22  0535 10/22/22  1420 10/22/22  0502 10/21/22  2108   SODIUM mmol/L 141 141 140 143 143   POTASSIUM mmol/L 2 8* 2 9* 2 8* 2 4* 2 4*   CHLORIDE mmol/L 105 104 102 103 102   CO2 mmol/L 28 27 30 32 31   ANION GAP mmol/L 8 10 8 8 10   BUN mg/dL 19 15 14 13 16   CREATININE mg/dL 1 44* 1 27 1 26 1 09 1 10   EGFR ml/min/1 73sq m 39 45 46 54 54   CALCIUM mg/dL 8 7 8 0* 8 7 8 2* 8 6   MAGNESIUM mg/dL  --  1 9 2 1 1 5* 1 6     Results from last 7 days   Lab Units 10/24/22  0437 10/21/22  2108   AST U/L 39 15   ALT U/L 26 15   ALK PHOS U/L 100 104   TOTAL PROTEIN g/dL 6 6 6 6   ALBUMIN g/dL 2 2* 2 3*   TOTAL BILIRUBIN mg/dL 0 24 0 19*     Results from last 7 days   Lab Units 10/24/22  1104 10/24/22  0716 10/23/22  2125 10/23/22  1521 10/23/22  1128 10/23/22  0637 10/22/22  2125 10/22/22  1611 10/22/22  1104 10/22/22  0942 10/21/22  2009 10/21/22  1630   POC GLUCOSE mg/dl 178* 165* 197* 198* 161* 231* 165* 218* 312* 328* 165* 303*     Results from last 7 days   Lab Units 10/24/22  0437 10/23/22  0535 10/22/22  1420 10/22/22  0502 10/21/22  2108   GLUCOSE RANDOM mg/dL 86 222* 207* 203* 176*           Results from last 7 days   Lab Units 10/22/22  0105 10/21/22  2316 10/21/22  2108   HS TNI 0HR ng/L  --   --  42   HS TNI 2HR ng/L  --  42  --    HSTNI D2 ng/L  --  0  --    HS TNI 4HR ng/L 39  --   --    HSTNI D4 ng/L -3  --   --          Results from last 7 days   Lab Units 10/21/22  2108   PROTIME seconds 13 2   INR  1 00   PTT seconds 28         Results from last 7 days   Lab Units 10/21/22  2108   PROCALCITONIN ng/ml 0 06     Results from last 7 days   Lab Units 10/21/22  2108   LACTIC ACID mmol/L 0 5             Results from last 7 days   Lab Units 10/21/22  2108   NT-PRO BNP pg/mL 2,405*                             Results from last 7 days   Lab Units 10/21/22  2128   CLARITY UA  Turbid   COLOR UA  Light Yellow   SPEC GRAV UA  >=1 030   PH UA  6 0   GLUCOSE UA mg/dl 250 (1/4%)*   KETONES UA mg/dl Negative   BLOOD UA  Large*   PROTEIN UA mg/dl >=300*   NITRITE UA  Negative   BILIRUBIN UA  Negative   UROBILINOGEN UA E U /dl 0 2   LEUKOCYTES UA  Negative   WBC UA /hpf Innumerable*   RBC UA /hpf Innumerable*   BACTERIA UA /hpf Innumerable*   EPITHELIAL CELLS WET PREP /hpf Moderate*     Results from last 7 days   Lab Units 10/21/22  2108   INFLUENZA A PCR  Negative   INFLUENZA B PCR  Negative   RSV PCR  Negative                             Results from last 7 days   Lab Units 10/21/22  2128 10/21/22  2108 10/21/22  2107   BLOOD CULTURE   --  No Growth at 48 hrs  No Growth at 48 hrs     URINE CULTURE  >100,000 cfu/ml Escherichia coli*  --   --                ED Treatment:   Medication Administration from 10/21/2022 1621 to 10/22/2022 0565       Date/Time Order Dose Route Action Comments     10/21/2022 2327 sodium chloride 0 9 % bolus 500 mL 0 mL Intravenous Stopped      10/21/2022 2119 sodium chloride 0 9 % bolus 500 mL 500 mL Intravenous New Bag      10/21/2022 2123 acetaminophen (TYLENOL) tablet 650 mg 650 mg Oral Given      10/22/2022 0135 vancomycin (VANCOCIN) 1,750 mg in sodium chloride 0 9 % 500 mL IVPB 0 mg/kg Intravenous Stopped      10/21/2022 2322 vancomycin (VANCOCIN) 1,750 mg in sodium chloride 0 9 % 500 mL IVPB 1,750 mg Intravenous New Bag other abx given first     10/21/2022 2319 piperacillin-tazobactam (ZOSYN) 4 5 g in sodium chloride 0 9 % 100 mL IVPB 0 g Intravenous Stopped      10/21/2022 2119 piperacillin-tazobactam (ZOSYN) 4 5 g in sodium chloride 0 9 % 100 mL IVPB 4 5 g Intravenous New Bag      10/21/2022 2231 potassium chloride (K-DUR,KLOR-CON) CR tablet 40 mEq 40 mEq Oral Given      10/22/2022 0135 potassium chloride 20 mEq IVPB (premix) 0 mEq Intravenous Stopped      10/21/2022 2228 potassium chloride 20 mEq IVPB (premix) 20 mEq Intravenous New Bag      10/21/2022 2250 morphine injection 4 mg 4 mg Intravenous Given      10/21/2022 2303 iohexol (OMNIPAQUE) 350 MG/ML injection (SINGLE-DOSE) 100 mL 100 mL Intravenous Given      10/22/2022 0135 sodium chloride 0 9 % bolus 250 mL 0 mL Intravenous Stopped      10/21/2022 2319 sodium chloride 0 9 % bolus 250 mL 250 mL Intravenous New Bag      10/21/2022 2358 lisinopril (ZESTRIL) tablet 40 mg 40 mg Oral Given      10/21/2022 2358 amLODIPine (NORVASC) tablet 10 mg 10 mg Oral Given      10/22/2022 0224 hydrALAZINE (APRESOLINE) injection 5 mg 5 mg Intravenous Given      10/22/2022 0304 morphine injection 4 mg 4 mg Intravenous Given      10/22/2022 0458 potassium chloride (K-DUR,KLOR-CON) CR tablet 40 mEq 40 mEq Oral Given      10/22/2022 0617 magnesium sulfate IVPB (premix) SOLN 1 g 0 g Intravenous Stopped      10/22/2022 0503 magnesium sulfate IVPB (premix) SOLN 1 g 1 g Intravenous New Bag      10/22/2022 0529 hydrALAZINE (APRESOLINE) injection 15 mg 15 mg Intravenous Given      10/22/2022 0750 magnesium sulfate IVPB (premix) SOLN 1 g 0 g Intravenous Stopped      10/22/2022 0617 magnesium sulfate IVPB (premix) SOLN 1 g 1 g Intravenous New Bag      10/22/2022 0814 potassium chloride 20 mEq IVPB (premix) 20 mEq Intravenous New Bag magnesium needs to infuse first     10/22/2022 0537 potassium chloride 20 mEq IVPB (premix) 0 mEq Intravenous Hold magnesium needs to infuse first     10/22/2022 0620 ondansetron [facility]) injection 4 mg 4 mg Intravenous Given      10/22/2022 0631 morphine injection 4 mg 4 mg Intravenous Given         Present on Admission:  • Depression, recurrent ([facility] [address] )  • Wound of lower extremity  • UTI (urinary tract infection)      Admitting Diagnosis: Hypokalemia [E87 6]  UTI (urinary tract infection) [N39 0]  OREILLY (dyspnea on exertion) [R06 09]  Hypertension [I10]  Necrotic toes (HCC) [I96]  Non healing left heel wound [S91 302A]  Open wound of foot, complicated, left, initial encounter [S91 302A]  Sepsis ([facility] [address] ) [A41 9]  Age/Sex: 65 y o  female  Admission Orders:  Scheduled Medications:  amLODIPine, 10 mg, Oral, Daily  aspirin, 81 mg, Oral, Daily  carvedilol, 12 5 mg, Oral, BID With Meals  gabapentin, 200 mg, Oral, BID  heparin (porcine), 5,000 Units, Subcutaneous, Q8H MICHELLE  insulin glargine, 60 Units, Subcutaneous, HS  insulin lispro, 1-6 Units, Subcutaneous, 4x Daily (AC & HS)  piperacillin-tazobactam, 3 375 g, Intravenous, Q6H  potassium chloride, 20 mEq, Intravenous, Once  sertraline, 100 mg, Oral, Daily      Continuous IV Infusions:  IVF  75 /hr - d/c   10/24     PRN Meds:  acetaminophen, 650 mg, Oral, Q6H PRN  ALPRAZolam, 0 25 mg, Oral, Daily PRN  hydrALAZINE, 10 mg, Intravenous, Q6H PRN  metoclopramide, 5 mg, Intravenous, Q6H PRN  ( x1  10/22 and X 1 10/23)    morphine injection, 4 mg, Intravenous, Q4H PRN  ( X 3  10/22 and  x1  10/24 thus far)  ondansetron, 4 mg, Intravenous, Q6H PRN  ( x3  10/22)    oxyCODONE, 5 mg, Oral, Q4H PRN        IP CONSULT TO PODIATRY    Network Utilization Review Department  ATTENTION: Please call with any questions or concerns to [phone] and carefully listen to the prompts so that you are directed to the right person   All voicemails are confidential   Alexx Villanueva all requests for admission clinical reviews, approved or denied determinations and any other requests to dedicated fax number below belonging to the campus where the patient is receiving treatment   List of dedicated fax numbers for the Facilities:  [address] [place] [address] DENIALS (Administrative/Medical Necessity) [phone]   [address]  (Maternity/NICU/Pediatrics) [phone]   [address] [phone]   [address] [phone]   [address] [address] [facility] [address] [place] [address] [phone]   [facility] [address] [facility] [phone]

## 2022-10-24 NOTE — ASSESSMENT & PLAN NOTE
· In setting of uncontrolled diabetes   · Podiatry input appreciated, will determine further intervention based on arterial duplex done today, report pending  · Remains on Zosyn

## 2022-10-24 NOTE — ASSESSMENT & PLAN NOTE
· Secondary to vomiting which has now resolved  · Continue replacement and monitoring     Results from last 7 days   Lab Units 10/24/22  0437 10/23/22  0535 10/22/22  1420 10/22/22  0502 10/21/22  2108   POTASSIUM mmol/L 2 8* 2 9* 2 8* 2 4* 2 4*

## 2022-10-24 NOTE — ASSESSMENT & PLAN NOTE
· Felt to be due to patient underdosing her meds because of cost   · Toprol changed to carvedilol with good response    · Continue lisinopril 40 mg and amlodipine 10 mg daily

## 2022-10-24 NOTE — ASSESSMENT & PLAN NOTE
Present on admission with Leucocytosis and tachycardia  Due to left foot wound  Resolved  Continued on IV Zosyn  F/u blood cultures, no growth 48 hours

## 2022-10-24 NOTE — UTILIZATION REVIEW
NOTIFICATION OF INPATIENT ADMISSION   AUTHORIZATION REQUEST   SERVICING FACILITY:   [address] [place] [address], [address]   Tax ID: [id number]  NPI: [id number] ATTENDING PROVIDER:  Attending Name and NPI#: Sury Salgado Md [[id number]]  Address: [address] [place] [address], [address] [facility]  Phone: [phone]     ADMISSION INFORMATION:  Place of Service: Inpatient [facility]  [address]  Place of Service Code: 21  Inpatient Admission Date/Time: 10/22/22  2:12 AM  Discharge Date/Time: No discharge date for patient encounter  Admitting Diagnosis Code/Description:  Hypokalemia [E87 6]  UTI (urinary tract infection) [N39 0]  OREILLY (dyspnea on exertion) [R06 09]  Hypertension [I10]  Necrotic toes (HCC) [I96]  Non healing left heel wound [S91 302A]  Open wound of foot, complicated, left, initial encounter [S91 302A]  Sepsis ([facility] [address] ) [A41 9]     UTILIZATION REVIEW CONTACT:  Carly Adler Utilization   Network Utilization Review Department  Phone: [phone]  Fax: [phone]  Email: [email or web address]  Contact for approvals/pending authorizations, clinical reviews, and discharge  PHYSICIAN ADVISORY SERVICES:  Medical Necessity Denial & Lsnu-iu-Slkd Review  Phone: [phone]  Fax: [phone]  Email: [email or web address]

## 2022-10-24 NOTE — CASE MANAGEMENT
Case Management Assessment & Discharge Planning Note    Patient name Gen Andre  Location [address] [place] [address] /[address] 80-* MRN [id number]  : 1960 Date 10/24/2022       Current Admission Date: 10/21/2022  Current Admission Diagnosis:Non healing left heel wound   Patient Active Problem List    Diagnosis Date Noted   • UTI (urinary tract infection) 10/23/2022   • N&V (nausea and vomiting) 10/22/2022   • Sepsis ([address] ) 10/22/2022   • Non healing left heel wound 10/22/2022   • Abnormal EKG 10/22/2022   • Hypertensive urgency 10/22/2022   • Hypokalemia 10/22/2022   • Wound of lower extremity 10/21/2022   • Epigastric pain 2022   • Type 2 diabetes mellitus with hyperglycemia, with long-term current use of insulin ([address] ) 10/18/2021   • Hyperparathyroidism ([address] ) 10/18/2021   • 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 ([address] ) 2021   • 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   • Moderate protein-calorie malnutrition ([address] ) 2020   • Acute colitis 2020   • Arthritis 2019   • Depression, recurrent ([address] ) 2018   • Class 3 severe obesity due to excess calories with serious comorbidity and body mass index (BMI) of 40 0 to 44 9 in adult ([address] ) 2018   • Esophageal reflux 2018   • Uncontrolled type 2 diabetes with neuropathy 2018   • Diabetic peripheral neuropathy ([address] )    • Systolic murmur    • Cerebral infarction ([address] ) 2015   • Anxiety 2015   • Vitamin D deficiency 2015   • Benign essential hypertension 2012   • Familial combined hyperlipidemia 2012      LOS (days): 2  Geometric Mean LOS (GMLOS) (days):   Days to GMLOS:     OBJECTIVE:    Risk of Unplanned Readmission Score: 15 75         Current admission status: Inpatient       Preferred Pharmacy:   [facility]/pharmacy #[id number] [place] - [address] [facility]  [address] [place] [address]  [facility] [id number]  Phone: [phone] Fax: [phone]    Primary Care Provider: ALEX Olivera    Primary Insurance: BLUE CROSS  Secondary Insurance:     ASSESSMENT:  [address] Proxies    There are no active Health Care Proxies on file  Advance Directives  Does patient have a [address] [facility] [address]?: No  Was patient offered paperwork?: Yes  Does patient currently have a Health Care decision maker?: Yes, please see Health Care Proxy section  Does patient have Advance Directives?: No  Was patient offered paperwork?: Yes  Primary Contact: Riky Porras, daughter         Readmission Root Cause  30 Day Readmission: No    Patient Information  Admitted from[de-identified] Home  Mental Status: Alert  During Assessment patient was accompanied by: Not accompanied during assessment  Assessment information provided by[de-identified] Patient  Primary Caregiver: Self  Support Systems: Daughter  [place] of Residence: [address] [facility] do you live in?: [address] [facility] [facility] [address] entry access options  Select all that apply : Stairs  Number of steps to enter home : 3  Do the steps have railings?: Yes  Type of Current Residence: [place] (laundry in the basement (full flight of steps))  In the last 12 months, was there a time when you were not able to pay the mortgage or rent on time?: No (However pt had trouble paying her oil    Daughter had to help with that )  In the last 12 months, how many places have you lived?: 1  In the last 12 months, was there a time when you did not have a steady place to sleep or slept in a shelter (including now)?: No  Living Arrangements: Lives Alone    Activities of Daily Living Prior to Admission  Functional Status: Independent  Completes ADLs independently?: Yes  Ambulates independently?: Yes  Does patient use assisted devices?: No  Does patient currently own DME?: Yes  What DME does the patient currently own?: [employer]  Does patient have a history of Outpatient Therapy (PT/OT)?: No  Does the patient have a history of Short-Term Rehab?: No  Does patient have a history of HHC?: No  Does patient currently have [address]?: No         Patient Information Continued  Income Source: Pension/skilled nursing  Does patient have prescription coverage?: Yes  Does patient receive dialysis treatments?: No  Does patient have a history of substance abuse?: No  Does patient have a history of Mental Health Diagnosis?: Yes (Depression)  Is patient receiving treatment for mental health?: Yes (PCP)  Has patient received inpatient treatment related to mental health in the last 2 years?: No    Means of Transportation  Means of Transport to Appts[de-identified] Drives Self    DISCHARGE DETAILS:    Discharge planning discussed with[de-identified] Patient        CM contacted family/caregiver?: No- see comments (pt declined)       Contacts  Patient Contacts: Lucinda Gautam  Relationship to Patient[de-identified] Family  Contact Method: Phone  Phone Number: [phone]  Reason/Outcome: Emergency Contact, Discharge Planning

## 2022-10-24 NOTE — PLAN OF CARE
Problem: Potential for Falls  Goal: Patient will remain free of falls  Description: INTERVENTIONS:  - Educate patient/family on patient safety including physical limitations  - Instruct patient to call for assistance with activity   - Consult OT/PT to assist with strengthening/mobility   - Keep Call bell within reach  - Keep bed low and locked with side rails adjusted as appropriate  - Keep care items and personal belongings within reach  - Initiate and maintain comfort rounds  - Make Fall Risk Sign visible to staff  - Offer Toileting every 2 Hours, in advance of need  - Initiate/Maintain Bed alarm  - Apply yellow socks and bracelet for high fall risk patients  - Consider moving patient to room near nurses station  Outcome: Progressing     Problem: MOBILITY - ADULT  Goal: Maintain or return to baseline ADL function  Description: INTERVENTIONS:  -  Assess patient's ability to carry out ADLs; assess patient's baseline for ADL function and identify physical deficits which impact ability to perform ADLs (bathing, care of mouth/teeth, toileting, grooming, dressing, etc )  - Assess/evaluate cause of self-care deficits   - Assess range of motion  - Assess patient's mobility; develop plan if impaired  - Assess patient's need for assistive devices and provide as appropriate  - Encourage maximum independence but intervene and supervise when necessary  - Involve family in performance of ADLs  - Assess for home care needs following discharge   - Consider OT consult to assist with ADL evaluation and planning for discharge  - Provide patient education as appropriate  Outcome: Progressing  Goal: Maintains/Returns to pre admission functional level  Description: INTERVENTIONS:  - Perform BMAT or MOVE assessment daily    - Set and communicate daily mobility goal to care team and patient/family/caregiver  - Collaborate with rehabilitation services on mobility goals if consulted  - Perform Range of Motion 3 times a day    - Reposition patient every 2 hours    - Dangle patient 3 times a day  - Stand patient 3 times a day  - Ambulate patient 3 times a day  - Out of bed to chair 3 times a day   - Out of bed for meals 3 times a day  - Out of bed for toileting  - Record patient progress and toleration of activity level   Outcome: Progressing     Problem: PAIN - ADULT  Goal: Verbalizes/displays adequate comfort level or baseline comfort level  Description: Interventions:  - Encourage patient to monitor pain and request assistance  - Assess pain using appropriate pain scale  - Administer analgesics based on type and severity of pain and evaluate response  - Implement non-pharmacological measures as appropriate and evaluate response  - Consider cultural and social influences on pain and pain management  - Notify physician/advanced practitioner if interventions unsuccessful or patient reports new pain  Outcome: Progressing     Problem: INFECTION - ADULT  Goal: Absence or prevention of progression during hospitalization  Description: INTERVENTIONS:  - Assess and monitor for signs and symptoms of infection  - Monitor lab/diagnostic results  - Monitor all insertion sites, i e  indwelling lines, tubes, and drains  - Monitor endotracheal if appropriate and nasal secretions for changes in amount and color  - [place] appropriate cooling/warming therapies per order  - Administer medications as ordered  - Instruct and encourage patient and family to use good hand hygiene technique  - Identify and instruct in appropriate isolation precautions for identified infection/condition  Outcome: Progressing  Goal: Absence of fever/infection during neutropenic period  Description: INTERVENTIONS:  - Monitor WBC    Outcome: Progressing     Problem: SAFETY ADULT  Goal: Patient will remain free of falls  Description: INTERVENTIONS:  - Educate patient/family on patient safety including physical limitations  - Instruct patient to call for assistance with activity   - Consult OT/PT to assist with strengthening/mobility   - Keep Call bell within reach  - Keep bed low and locked with side rails adjusted as appropriate  - Keep care items and personal belongings within reach  - Initiate and maintain comfort rounds  - Make Fall Risk Sign visible to staff  - Offer Toileting every 2 Hours, in advance of need  - Initiate/Maintain Bed alarm  - Apply yellow socks and bracelet for high fall risk patients  - Consider moving patient to room near nurses station  Outcome: Progressing  Goal: Maintain or return to baseline ADL function  Description: INTERVENTIONS:  -  Assess patient's ability to carry out ADLs; assess patient's baseline for ADL function and identify physical deficits which impact ability to perform ADLs (bathing, care of mouth/teeth, toileting, grooming, dressing, etc )  - Assess/evaluate cause of self-care deficits   - Assess range of motion  - Assess patient's mobility; develop plan if impaired  - Assess patient's need for assistive devices and provide as appropriate  - Encourage maximum independence but intervene and supervise when necessary  - Involve family in performance of ADLs  - Assess for home care needs following discharge   - Consider OT consult to assist with ADL evaluation and planning for discharge  - Provide patient education as appropriate  Outcome: Progressing  Goal: Maintains/Returns to pre admission functional level  Description: INTERVENTIONS:  - Perform BMAT or MOVE assessment daily    - Set and communicate daily mobility goal to care team and patient/family/caregiver  - Collaborate with rehabilitation services on mobility goals if consulted  - Perform Range of Motion 3 times a day  - Reposition patient every 2 hours    - Dangle patient 3 times a day  - Stand patient 3 times a day  - Ambulate patient 3 times a day  - Out of bed to chair 3 times a day   - Out of bed for meals 3 times a day  - Out of bed for toileting  - Record patient progress and toleration of activity level   Outcome: Progressing     Problem: DISCHARGE PLANNING  Goal: Discharge to home or other facility with appropriate resources  Description: INTERVENTIONS:  - Identify barriers to discharge w/patient and caregiver  - Arrange for needed discharge resources and transportation as appropriate  - Identify discharge learning needs (meds, wound care, etc )  - Arrange for interpretive services to assist at discharge as needed  - Refer to Case Management Department for coordinating discharge planning if the patient needs post-hospital services based on physician/advanced practitioner order or complex needs related to functional status, cognitive ability, or social support system  Outcome: Progressing     Problem: Knowledge Deficit  Goal: Patient/family/caregiver demonstrates understanding of disease process, treatment plan, medications, and discharge instructions  Description: Complete learning assessment and assess knowledge base    Interventions:  - Provide teaching at level of understanding  - Provide teaching via preferred learning methods  Outcome: Progressing     Problem: METABOLIC, FLUID AND ELECTROLYTES - ADULT  Goal: Electrolytes maintained within normal limits  Description: INTERVENTIONS:  - Monitor labs and assess patient for signs and symptoms of electrolyte imbalances  - Administer electrolyte replacement as ordered  - Monitor response to electrolyte replacements, including repeat lab results as appropriate  - Instruct patient on fluid and nutrition as appropriate  Outcome: Progressing  Goal: Fluid balance maintained  Description: INTERVENTIONS:  - Monitor labs   - Monitor I/O and WT  - Instruct patient on fluid and nutrition as appropriate  - Assess for signs & symptoms of volume excess or deficit  Outcome: Progressing  Goal: Glucose maintained within target range  Description: INTERVENTIONS:  - Monitor Blood Glucose as ordered  - Assess for signs and symptoms of hyperglycemia and hypoglycemia  - Administer ordered medications to maintain glucose within target range  - Assess nutritional intake and initiate nutrition service referral as needed  Outcome: Progressing     Problem: SKIN/TISSUE INTEGRITY - ADULT  Goal: Skin Integrity remains intact(Skin Breakdown Prevention)  Description: Assess:  -Perform N[place]or assessment every 12hrs   -Clean and moisturize skin every 12hrs   -Inspect skin when repositioning, toileting, and assisting with ADLS  -Assess extremities for adequate circulation and sensation     Bed Management:  -Have minimal linens on bed & keep smooth, unwrinkled  -Change linens as needed when moist or perspiring  -Avoid sitting or lying in one position for more than 2 hours while in bed  -Keep HOB at 30 degrees     Toileting:  -Offer bedside commode    Activity:  -Mobilize patient 3 times a day  -Encourage activity and walks on unit  -Encourage or provide ROM exercises   -Turn and reposition patient every 2 Hours  -Use appropriate equipment to lift or move patient in bed  -Instruct/ Assist with weight shifting every 30 min when out of bed in chair  -Consider limitation of chair time 2 hour intervals    Skin Care:  -Avoid use of baby powder, tape, friction and shearing, hot water or constrictive clothing  -Relieve pressure over bony prominences using cushion  -Do not massage red bony areas    Outcome: Progressing  Goal: Incision(s), wounds(s) or drain site(s) healing without S/S of infection  Description: INTERVENTIONS  - Assess and document dressing, incision, wound bed, drain sites and surrounding tissue  - Provide patient and family education  - Perform skin care/dressing changes every 12hrs   Outcome: Progressing       Problem: MUSCULOSKELETAL - ADULT  Goal: Maintain or return mobility to safest level of function  Description: INTERVENTIONS:  - Assess patient's ability to carry out ADLs; assess patient's baseline for ADL function and identify physical deficits which impact ability to perform ADLs (bathing, care of mouth/teeth, toileting, grooming, dressing, etc )  - Assess/evaluate cause of self-care deficits   - Assess range of motion  - Assess patient's mobility  - Assess patient's need for assistive devices and provide as appropriate  - Encourage maximum independence but intervene and supervise when necessary  - Involve family in performance of ADLs  - Assess for home care needs following discharge   - Consider OT consult to assist with ADL evaluation and planning for discharge  - Provide patient education as appropriate  Outcome: Progressing  Goal: Maintain proper alignment of affected body part  Description: INTERVENTIONS:  - Support, maintain and protect limb and body alignment  - Provide patient/ family with appropriate education  Outcome: Progressing

## 2022-10-24 NOTE — ASSESSMENT & PLAN NOTE
· Preliminary urine cultures with gram negative rods, final cultures pending  Reports improvement in symptoms   Remains on Zosyn for foot infection

## 2022-10-25 PROBLEM — I73.9 PAD (PERIPHERAL ARTERY DISEASE) (HCC): Status: ACTIVE | Noted: 2022-10-25

## 2022-10-25 PROBLEM — N17.9 AKI (ACUTE KIDNEY INJURY) (HCC): Status: ACTIVE | Noted: 2022-10-25

## 2022-10-25 LAB
ANION GAP SERPL CALCULATED.3IONS-SCNC: 9 MMOL/L (ref 4–13)
BASOPHILS # BLD AUTO: 0.08 THOUSANDS/ÂΜL (ref 0–0.1)
BASOPHILS NFR BLD AUTO: 1 % (ref 0–1)
BUN SERPL-MCNC: 19 MG/DL (ref 5–25)
CALCIUM SERPL-MCNC: 8.9 MG/DL (ref 8.3–10.1)
CHLORIDE SERPL-SCNC: 105 MMOL/L (ref 96–108)
CO2 SERPL-SCNC: 25 MMOL/L (ref 21–32)
CREAT SERPL-MCNC: 1.43 MG/DL (ref 0.6–1.3)
EOSINOPHIL # BLD AUTO: 0.67 THOUSAND/ÂΜL (ref 0–0.61)
EOSINOPHIL NFR BLD AUTO: 5 % (ref 0–6)
ERYTHROCYTE [DISTWIDTH] IN BLOOD BY AUTOMATED COUNT: 14.7 % (ref 11.6–15.1)
EST. AVERAGE GLUCOSE BLD GHB EST-MCNC: 235 MG/DL
FERRITIN SERPL-MCNC: 60 NG/ML (ref 8–388)
GFR SERPL CREATININE-BSD FRML MDRD: 39 ML/MIN/1.73SQ M
GLUCOSE SERPL-MCNC: 106 MG/DL (ref 65–140)
GLUCOSE SERPL-MCNC: 115 MG/DL (ref 65–140)
GLUCOSE SERPL-MCNC: 135 MG/DL (ref 65–140)
GLUCOSE SERPL-MCNC: 147 MG/DL (ref 65–140)
GLUCOSE SERPL-MCNC: 181 MG/DL (ref 65–140)
HBA1C MFR BLD: 9.8 %
HCT VFR BLD AUTO: 28.8 % (ref 34.8–46.1)
HGB BLD-MCNC: 9 G/DL (ref 11.5–15.4)
IMM GRANULOCYTES # BLD AUTO: 0.11 THOUSAND/UL (ref 0–0.2)
IMM GRANULOCYTES NFR BLD AUTO: 1 % (ref 0–2)
IRON SATN MFR SERPL: 16 % (ref 15–50)
IRON SERPL-MCNC: 49 UG/DL (ref 50–170)
LYMPHOCYTES # BLD AUTO: 2.03 THOUSANDS/ÂΜL (ref 0.6–4.47)
LYMPHOCYTES NFR BLD AUTO: 16 % (ref 14–44)
MCH RBC QN AUTO: 27.4 PG (ref 26.8–34.3)
MCHC RBC AUTO-ENTMCNC: 31.3 G/DL (ref 31.4–37.4)
MCV RBC AUTO: 88 FL (ref 82–98)
MONOCYTES # BLD AUTO: 0.79 THOUSAND/ÂΜL (ref 0.17–1.22)
MONOCYTES NFR BLD AUTO: 6 % (ref 4–12)
NEUTROPHILS # BLD AUTO: 8.81 THOUSANDS/ÂΜL (ref 1.85–7.62)
NEUTS SEG NFR BLD AUTO: 71 % (ref 43–75)
NRBC BLD AUTO-RTO: 0 /100 WBCS
PLATELET # BLD AUTO: 329 THOUSANDS/UL (ref 149–390)
PMV BLD AUTO: 8.7 FL (ref 8.9–12.7)
POTASSIUM SERPL-SCNC: 3.2 MMOL/L (ref 3.5–5.3)
RBC # BLD AUTO: 3.28 MILLION/UL (ref 3.81–5.12)
SODIUM SERPL-SCNC: 139 MMOL/L (ref 135–147)
TIBC SERPL-MCNC: 299 UG/DL (ref 250–450)
WBC # BLD AUTO: 12.49 THOUSAND/UL (ref 4.31–10.16)

## 2022-10-25 PROCEDURE — 99233 SBSQ HOSP IP/OBS HIGH 50: CPT | Performed by: FAMILY MEDICINE

## 2022-10-25 PROCEDURE — 80048 BASIC METABOLIC PNL TOTAL CA: CPT | Performed by: FAMILY MEDICINE

## 2022-10-25 PROCEDURE — 99232 SBSQ HOSP IP/OBS MODERATE 35: CPT | Performed by: PODIATRIST

## 2022-10-25 PROCEDURE — 82728 ASSAY OF FERRITIN: CPT | Performed by: NURSE PRACTITIONER

## 2022-10-25 PROCEDURE — 82948 REAGENT STRIP/BLOOD GLUCOSE: CPT

## 2022-10-25 PROCEDURE — 83550 IRON BINDING TEST: CPT | Performed by: NURSE PRACTITIONER

## 2022-10-25 PROCEDURE — 99254 IP/OBS CNSLTJ NEW/EST MOD 60: CPT | Performed by: PHYSICIAN ASSISTANT

## 2022-10-25 PROCEDURE — 83036 HEMOGLOBIN GLYCOSYLATED A1C: CPT | Performed by: FAMILY MEDICINE

## 2022-10-25 PROCEDURE — 85025 COMPLETE CBC W/AUTO DIFF WBC: CPT | Performed by: FAMILY MEDICINE

## 2022-10-25 PROCEDURE — 83540 ASSAY OF IRON: CPT | Performed by: NURSE PRACTITIONER

## 2022-10-25 PROCEDURE — 97163 PT EVAL HIGH COMPLEX 45 MIN: CPT

## 2022-10-25 PROCEDURE — 99255 IP/OBS CONSLTJ NEW/EST HI 80: CPT | Performed by: INTERNAL MEDICINE

## 2022-10-25 PROCEDURE — 3046F HEMOGLOBIN A1C LEVEL >9.0%: CPT | Performed by: NURSE PRACTITIONER

## 2022-10-25 PROCEDURE — 97167 OT EVAL HIGH COMPLEX 60 MIN: CPT

## 2022-10-25 RX ORDER — POTASSIUM CHLORIDE 20 MEQ/1
40 TABLET, EXTENDED RELEASE ORAL 2 TIMES DAILY
Status: COMPLETED | OUTPATIENT
Start: 2022-10-25 | End: 2022-10-25

## 2022-10-25 RX ORDER — CARVEDILOL 12.5 MG/1
12.5 TABLET ORAL 2 TIMES DAILY WITH MEALS
Status: DISCONTINUED | OUTPATIENT
Start: 2022-10-25 | End: 2022-10-26

## 2022-10-25 RX ORDER — AMLODIPINE BESYLATE 10 MG/1
10 TABLET ORAL DAILY
Status: DISCONTINUED | OUTPATIENT
Start: 2022-10-26 | End: 2023-01-12 | Stop reason: HOSPADM

## 2022-10-25 RX ADMIN — ASPIRIN 81 MG CHEWABLE TABLET 81 MG: 81 TABLET CHEWABLE at 08:50

## 2022-10-25 RX ADMIN — HEPARIN SODIUM 5000 UNITS: 5000 INJECTION INTRAVENOUS; SUBCUTANEOUS at 05:25

## 2022-10-25 RX ADMIN — POTASSIUM CHLORIDE 40 MEQ: 1500 TABLET, EXTENDED RELEASE ORAL at 18:09

## 2022-10-25 RX ADMIN — INSULIN LISPRO 5 UNITS: 100 INJECTION, SOLUTION INTRAVENOUS; SUBCUTANEOUS at 07:37

## 2022-10-25 RX ADMIN — ALPRAZOLAM 0.25 MG: 0.25 TABLET ORAL at 18:13

## 2022-10-25 RX ADMIN — INSULIN LISPRO 5 UNITS: 100 INJECTION, SOLUTION INTRAVENOUS; SUBCUTANEOUS at 16:29

## 2022-10-25 RX ADMIN — HEPARIN SODIUM 5000 UNITS: 5000 INJECTION INTRAVENOUS; SUBCUTANEOUS at 22:57

## 2022-10-25 RX ADMIN — GABAPENTIN 200 MG: 100 CAPSULE ORAL at 18:09

## 2022-10-25 RX ADMIN — OXYCODONE HYDROCHLORIDE 5 MG: 5 TABLET ORAL at 16:33

## 2022-10-25 RX ADMIN — AMLODIPINE BESYLATE 10 MG: 10 TABLET ORAL at 08:50

## 2022-10-25 RX ADMIN — HEPARIN SODIUM 5000 UNITS: 5000 INJECTION INTRAVENOUS; SUBCUTANEOUS at 13:23

## 2022-10-25 RX ADMIN — OXYCODONE HYDROCHLORIDE 5 MG: 5 TABLET ORAL at 04:56

## 2022-10-25 RX ADMIN — OXYCODONE HYDROCHLORIDE 5 MG: 5 TABLET ORAL at 12:13

## 2022-10-25 RX ADMIN — CARVEDILOL 12.5 MG: 12.5 TABLET, FILM COATED ORAL at 07:37

## 2022-10-25 RX ADMIN — SERTRALINE HYDROCHLORIDE 100 MG: 100 TABLET ORAL at 08:50

## 2022-10-25 RX ADMIN — CARVEDILOL 12.5 MG: 12.5 TABLET, FILM COATED ORAL at 16:28

## 2022-10-25 RX ADMIN — PIPERACILLIN AND TAZOBACTAM 3.38 G: 3; .375 INJECTION, POWDER, LYOPHILIZED, FOR SOLUTION INTRAVENOUS at 18:09

## 2022-10-25 RX ADMIN — PIPERACILLIN AND TAZOBACTAM 3.38 G: 3; .375 INJECTION, POWDER, LYOPHILIZED, FOR SOLUTION INTRAVENOUS at 04:48

## 2022-10-25 RX ADMIN — INSULIN GLARGINE 60 UNITS: 100 INJECTION, SOLUTION SUBCUTANEOUS at 22:57

## 2022-10-25 RX ADMIN — GABAPENTIN 200 MG: 100 CAPSULE ORAL at 08:50

## 2022-10-25 RX ADMIN — OXYCODONE HYDROCHLORIDE 5 MG: 5 TABLET ORAL at 08:52

## 2022-10-25 RX ADMIN — PIPERACILLIN AND TAZOBACTAM 3.38 G: 3; .375 INJECTION, POWDER, LYOPHILIZED, FOR SOLUTION INTRAVENOUS at 12:08

## 2022-10-25 RX ADMIN — POTASSIUM CHLORIDE 40 MEQ: 1500 TABLET, EXTENDED RELEASE ORAL at 07:37

## 2022-10-25 RX ADMIN — INSULIN LISPRO 5 UNITS: 100 INJECTION, SOLUTION INTRAVENOUS; SUBCUTANEOUS at 12:03

## 2022-10-25 RX ADMIN — POTASSIUM CHLORIDE 40 MEQ: 1500 TABLET, EXTENDED RELEASE ORAL at 12:03

## 2022-10-25 NOTE — PHYSICAL THERAPY NOTE
PHYSICAL THERAPY EVALUATION          Patient Name: Rowdy Contreras  VGLEL'I Date: 10/25/2022  PT EVALUATION    65 y o     [id number]    Hypokalemia [E87 6]  UTI (urinary tract infection) [N39 0]  OREILLY (dyspnea on exertion) [R06 09]  Hypertension [I10]  Necrotic toes (HCC) [I96]  Non healing left heel wound [S91 302A]  Open wound of foot, complicated, left, initial encounter [S91 302A]  Sepsis ([facility] ) [A41 9]    Past Medical History:   Diagnosis Date    Anxiety     Depression     Diabetes ([facility] )     Diabetes mellitus ([facility] )     Esophageal reflux     28 APR 2015 RESOLVED    Hyperlipidemia     Hypertension     Low back pain     sciatica    Pneumonia 2019    Stroke ([facility] ) 12/2015    small vessel, L frontal corona radiata  Rx asa 81, Lipitor 40, risk modification    Vitamin D deficiency      Past Surgical History:   Procedure Laterality Date    COLONOSCOPY          10/25/22 1016   PT Last Visit   PT Visit Date 10/25/22   Note Type   Note type Evaluation   Pain Assessment   Pain Assessment Tool 0-10   Pain Score 8  (7/10 R, 8/10 L)   Pain Location/Orientation Orientation: Bilateral;Location: Foot   Hospital Pain Intervention(s) Repositioned; Ambulation/increased activity; Elevated; Emotional support   Restrictions/Precautions   Weight Bearing Precautions Per Order Yes   RLE Weight Bearing Per Order WBAT   LLE Weight Bearing Per Order (S)  NWB   Other Precautions WBS; Fall Risk;Pain   Home Living   Type of [address] [place] [address] Two level; Laundry in basement;Able to live on main level with bedroom/bathroom;Stairs to enter with rails   Bathroom Shower/Tub Walk-in shower   Bathroom Toilet Standard   Bathroom Accessibility Not accessible  (via wc)   [address]   Additional Comments 3 LELO w HR and grab bars  FOS to basement laundry  home is primarily wc accessible   Prior Function   Level of [place] Independent with ADLs; Independent with functional mobility; Independent with IADLS   Lives With Alone   Receives Help From Family  (local brother, dtr if needed)   IADLs Independent with driving; Independent with meal prep   Falls in the last 6 months 0   Comments per patient, indep at baseline but has been having increased difficulty since the summer  using cane for comfort  General   Additional Pertinent History pt admitted 10/21/22 for non healing L wound  nwb LLE and wbat RLE per podiatry  activity orders per podiatry  PMHx significant for DM, newly dx PVD, anxiety, depression, stroke   Cognition   Overall Cognitive Status WFL   Arousal/Participation Cooperative   Attention Within functional limits   Orientation Level Oriented X4   Memory Within functional limits   Following Commands Follows all commands and directions without difficulty   Comments demonstrates good insight   RLE Assessment   RLE Assessment WFL   LLE Assessment   LLE Assessment WFL   Coordination   Sensation   (Pitting edema BLE R>L)   Light Touch   RLE Light Touch Grossly intact   LLE Light Touch Grossly intact   Transfers   Additional Comments supervision for transf when wb/non compliant  increased assist needed to maintain nwb during transf   Ambulation/Elevation   Gait pattern Forward Flexion; Antalgic; Excessively slow  (ttwb on L)   Gait Assistance 5  Supervision   Additional items Assist x 1   Assistive Device Rolling walker   Distance 8'   Balance   Dynamic Sitting Fair  (unable to perform LBD without assist due to poor reach/ limited LE ROM)   Static Standing Fair -  (w RW when nwb on L/ SLS on R)   Endurance Deficit   Endurance Deficit Yes   Endurance Deficit Description easily fatigues  reporting SOB throughout session due to recent inactivity   Activity Tolerance   Activity Tolerance Treatment limited secondary to medical complications (Comment)  (wbs)   Medical Staff Made Aware OT; CM; MD   Nurse Made Aware Gilberto Kline RN   Assessment   Prognosis Fair   Problem List Decreased strength;Decreased endurance; Impaired balance;Decreased mobility;Obesity; Decreased skin integrity;Pain   Assessment Sukhi Cardoza is a 65 y o  female admitted to [facility] on 10/21/2022 for Non healing left heel wound  At high risk for limb loss per podiatry  PT was consulted and pt was seen on 10/25/2022 for mobility assessment and d/c planning  Pt presents w NWB LLE, WBAT RLE, high fall risk, subacute pain in bl feet  Pt reports at baseline is indep without an AD however has been experiencing increased difficulty at home since the summer  Educated pt on NWB LLE status however given RLE pain and weakness as well as BUE weakness pt unable to maintain NWB during transfers or ambulation  Ambulated back to bathroom TTWB on L  Was able to static stand while maintaining NWB L however tolerance limited by RLE pain and weakness  Pt also requires assist to perform dynamic sitting and standing tasks as part of ADLs (toileting, LBD)  Currently does not demonstrate ability to d/c home at current LOF  Encouraged stand piv transf bed<>chair/BSC only during hospital stay while NWB to promote compliance  Given difficulty hopping would consider emphasis on alternate methods of mobility to comply w NWB specifically slide board or sit pivot to wc as house is wc accessible  I do not think pt will tolerate knee sling or Rollabout at this time given RLE pain and weakness  Pt will benefit from continued skilled IP PT to address the above mentioned impairments  in order to maximize recovery and increase functional independence when completing mobility and ADLs  At this time PT recommendations for d/c are STR given social and environmental barriers and need for further mobility training given NWB LLE  Barriers to Discharge Inaccessible home environment;Decreased caregiver support   Barriers to Discharge Comments steps, lives alone   Goals   Patient Goals get in better health back to baseline indep   STG Expiration Date 11/08/22   Short Term Goal #1 1)    Pt will perform bed mobility with Gonzalo demonstrating appropriate technique 100% of the time in order to improve function  2)  Perform all transfers with S demonstrating safe and appropriate technique 100% of the time in order to improve ability to negotiate safely in home environment  3) Amb with least restrictive AD > 10'x2 with min A in order to demonstrate ability to negotiate in home environment  4)  Improve overall strength and balance 1/2 grade in order to optimize ability to perform functional tasks and reduce fall risk  5) Increase activity tolerance to 45 minutes in order to improve endurance to functional tasks  6)  Wc mobility >50' at S level to negotiate household distances  7) PT for ongoing patient and family/caregiver education, DME needs and d/c planning in order to promote highest level of function in least restrictive environment  Plan   Treatment/Interventions Functional transfer training;LE strengthening/ROM; Therapeutic exercise; Endurance training;Patient/family training;Equipment eval/education; Bed mobility; Compensatory technique education;Gait training;Continued evaluation;Spoke to nursing;Spoke to MD;Spoke to case management;OT   PT Frequency 4-6x/wk   Recommendation   PT Discharge Recommendation Post acute rehabilitation services   AM-PAC Basic Mobility Inpatient   Turning in Bed Without Bedrails 3   Lying on Back to Sitting on Edge of Flat Bed 3   Moving Bed to Chair 2   Standing Up From Chair 2   Walk in Room 2   Climb 3-5 Stairs 1   Basic Mobility Inpatient Raw Score 13   Basic Mobility Standardized Score 33 99   Highest Level Of Mobility   JH-HLM Goal 4: Move to chair/commode   JH-HLM Achieved 4: Move to chair/commode   End of Consult   Patient Position at End of Consult Bedside chair; All needs within reach   History: co - morbidities, social background, fall risk, recent use of assistive device, wbs  Exam: impairments in systems including musculoskeletal (strength), neuromuscular (balance, gait, transfers, motor function and sensation), integumentary (skin integrity, presence of bl heel wounds), am-pac, cognition  Clinical: unstable/unpredictable  Complexity:high      Marj Kiss

## 2022-10-25 NOTE — CONSULTS
[facility] 1960, 65 y o  female MRN: [id number]  Unit/Bed#: E2 -01 Encounter: [id number]  Primary Care Provider: ALEX Blackwood   Date and time admitted to hospital: 10/21/2022  7:58 PM    Inpatient consult to Vascular Surgery  Consult performed by: Jeff Miranda PA-C  Consult ordered by: Jermaine Cast DPM        PAD (peripheral artery disease) [facility])  Assessment & Plan  65year old female former smoker w/HTN, HLD, uncontrolled type II DM (A1c 9 8), hx CVA, presenting with nonhealing extensive L heel ulceration and R hallux and 5th digit ulceration  Vascular surgery consulted for input  TARI duplex 10/24/22: Rt- monophasic waveforms of the CFA indicative of proximal disease, SHARIF 0 80/64/61  Lt- diffuse disease without focal stenosis, SHARIF 0 61/21/35    sCr/eGFR: 1 43/39  ABC: 12 49     Recommendations:  -Bilateral tissue loss in the setting of uncontrolled type II DM and NYDIA on admission  -Diminished femoral pulses bilaterally on exam  -Extensive L heel tissue loss with R hallux and 5th digit tissue loss  Patient will likely require bilateral intervention  Questionable salvageability of the LLE   -Given diminished femoral pulses bilaterally, recommend CTA of the abdomen with run off  With NYDIA on admission, will ask for Nephrology assistance for optimization prior  -Medical management with ASA, will add statin to medical regimen  -Continue local wound care per podiatry  -Medical management and glucose control per SLIM  -Follow up Nephrology recommendations for CTA preparation   -D/w Dr Radha Magana    HPI: Feli Elena is a 65 y o  female former smoker with hypertension, hyperlipidemia, uncontrolled type 2 diabetes mellitus, history of CVA, presenting with nonhealing extensive left heel ulceration and right hallux and 5th digit ulcerations  Vascular surgery consult for input    Lower extremity arterial duplex obtained which demonstrates monophasic waveforms of the right common femoral artery indicative of proximal disease  Left lower extremity with diffuse disease without focal stenosis  Toe pressure on the right peers to be above healing 61  Diminished toe pressure on the left  Patient noted to have NYDIA on admission  Patient is ambulatory  Denies claudication or rest pain  Review of Systems:  General: negative  Cardiovascular: no chest pain or dyspnea on exertion  Respiratory: no cough, shortness of breath, or wheezing  Gastrointestinal: no abdominal pain, change in bowel habits, or black or bloody stools  Genitourinary ROS: no dysuria, trouble voiding, or hematuria  Musculoskeletal ROS: positive for - pain in foot - bilateral  Neurological ROS: no TIA or stroke symptoms  Hematological and Lymphatic ROS: negative  Dermatological ROS: negative  Psychological ROS: negative  Ophthalmic ROS: negative  ENT ROS: negative    Past Medical History:  Past Medical History:   Diagnosis Date   • Anxiety    • Depression    • Diabetes ([facility] )    • Diabetes mellitus ([facility] )    • Esophageal reflux     2015 RESOLVED   • Hyperlipidemia    • Hypertension    • Low back pain     sciatica   • Pneumonia    • Stroke ([facility] ) 2015    small vessel, L frontal corona radiata   Rx asa 81, Lipitor 40, risk modification   • Vitamin D deficiency        Past Surgical History:  Past Surgical History:   Procedure Laterality Date   • COLONOSCOPY         Social History:  Social History     Substance and Sexual Activity   Alcohol Use No    Comment: OCCASIONAL ALCOHOL USE IN ALL SCRIPTS     Social History     Substance and Sexual Activity   Drug Use No     Social History     Tobacco Use   Smoking Status Former Smoker   • Types: Cigarettes   • Quit date:    • Years since quittin 8   Smokeless Tobacco Never Used       Family History:  Family History   Problem Relation Age of Onset   • Diabetes Mother    • Lung cancer Mother    • Cancer Father    • Lung cancer Father    • Hypertension Brother    • Hypertension Family        Allergies:  No Known Allergies    Medications:  Current Facility-Administered Medications   Medication Dose Route Frequency   • acetaminophen (TYLENOL) tablet 650 mg  650 mg Oral Q6H PRN   • ALPRAZolam (XANAX) tablet 0 25 mg  0 25 mg Oral Daily PRN   • [START ON 10/26/2022] amLODIPine (NORVASC) tablet 10 mg  10 mg Oral Daily   • aspirin chewable tablet 81 mg  81 mg Oral Daily   • carvedilol (COREG) tablet 12 5 mg  12 5 mg Oral BID With Meals   • gabapentin (NEURONTIN) capsule 200 mg  200 mg Oral BID   • heparin (porcine) subcutaneous injection 5,000 Units  5,000 Units Subcutaneous Q8H [address]   • hydrALAZINE (APRESOLINE) injection 10 mg  10 mg Intravenous Q6H PRN   • insulin glargine (LANTUS) subcutaneous injection 60 Units 0 6 mL  60 Units Subcutaneous HS   • insulin lispro (HumaLOG) 100 units/mL subcutaneous injection 1-6 Units  1-6 Units Subcutaneous 4x Daily (AC & HS)   • insulin lispro (HumaLOG) 100 units/mL subcutaneous injection 5 Units  5 Units Subcutaneous TID With Meals   • metoclopramide (REGLAN) injection 5 mg  5 mg Intravenous Q6H PRN   • morphine injection 4 mg  4 mg Intravenous Q4H PRN   • ondansetron (ZOFRAN) injection 4 mg  4 mg Intravenous Q6H PRN   • oxyCODONE (ROXICODONE) IR tablet 5 mg  5 mg Oral Q4H PRN   • piperacillin-tazobactam (ZOSYN) 3 375 g in sodium chloride 0 9 % 100 mL IVPB  3 375 g Intravenous Q6H   • potassium chloride (K-DUR,KLOR-CON) CR tablet 40 mEq  40 mEq Oral BID   • potassium chloride 20 mEq IVPB (premix)  20 mEq Intravenous Once   • sertraline (ZOLOFT) tablet 100 mg  100 mg Oral Daily       Vitals:  BP      Temp      Pulse     Resp      SpO2        I/Os:  I/O last 3 completed shifts: In: 3593 8 [P O :480; I V :3113 8]  Out: -   No intake/output data recorded      Lab Results and Cultures:   CBC with diff:   Lab Results   Component Value Date    WBC 12 49 (H) 10/25/2022    HGB 9 0 (L) 10/25/2022    HCT 28 8 (L) 10/25/2022    MCV 88 10/25/2022     10/25/2022    MCH 27 4 10/25/2022    MCHC 31 3 (L) 10/25/2022    RDW 14 7 10/25/2022    MPV 8 7 (L) 10/25/2022    NRBC 0 10/25/2022   ,   BMP/CMP:  Lab Results   Component Value Date    K 3 2 (L) 10/25/2022     10/25/2022    CO2 25 10/25/2022    BUN 19 10/25/2022    CREATININE 1 43 (H) 10/25/2022    CALCIUM 8 9 10/25/2022    AST 39 10/24/2022    ALT 26 10/24/2022    ALKPHOS 100 10/24/2022    EGFR 39 10/25/2022   ,   Lipid Panel: No results found for: CHOL,   Coags:   Lab Results   Component Value Date    PTT 28 10/21/2022    INR 1 00 10/21/2022   ,     Blood Culture:   Lab Results   Component Value Date    BLOODCX No Growth at 72 hrs  10/21/2022   ,   Urinalysis:   Lab Results   Component Value Date    COLORU Light Yellow 10/21/2022    CLARITYU Turbid 10/21/2022    SPECGRAV >=1 030 10/21/2022    PHUR 6 0 10/21/2022    PHUR 6 5 02/23/2018    LEUKOCYTESUR Negative 10/21/2022    NITRITE Negative 10/21/2022    GLUCOSEU 250 (1/4%) (A) 10/21/2022    KETONESU Negative 10/21/2022    BILIRUBINUR Negative 10/21/2022    BLOODU Large (A) 10/21/2022   ,   Urine Culture:   Lab Results   Component Value Date    URINECX >100,000 cfu/ml Escherichia coli (A) 10/21/2022   ,   Wound Culure: No results found for: WOUNDCULT    Imaging:  Reviewed    Physical Exam:    General appearance: alert and oriented, in no acute distress  Head: Normocephalic, without obvious abnormality, atraumatic  Eyes: negative  Throat: lips, mucosa, and tongue normal; teeth and gums normal  Neck: no JVD and supple, symmetrical, trachea midline  Back: symmetric, no curvature  ROM normal  No CVA tenderness  Lungs: clear to auscultation bilaterally  Chest wall: no tenderness  Heart: regular rate and rhythm, S1, S2 normal, no murmur, click, rub or gallop  Abdomen: soft, non-tender; bowel sounds normal; no masses,  no organomegaly  Extremities:  Extensive left heel ulceration with dry gangrene    Right lower extremity with 5th digit and hallux dry gangrene  Skin: Skin color, texture, turgor normal  No rashes or lesions  Neurologic: Grossly normal    Wound/Incision:                    Pulse exam:  Radial: Right: 2+ Left[de-identified] 2+  Femoral: Right: 1+ Left: 1+  DP: Right: non-palpable Left: non-palpable  PT: Right: non-palpable Left: non-palpable    [facility]  10/25/2022

## 2022-10-25 NOTE — CONSULTS
Consultation - Nephrology   Tori Bloodgood 65 y o  female MRN: [id number]  Unit/Bed#: E2 -01 Encounter: [id number]    ASSESSMENT/PLAN:  Acute kidney injury:  Suspect multifactorial etiology in the setting of sepsis, urinary tract infection, Ace inhibition, and possible contrast associated nephropathy +/-progression to ATN  -baseline creatinine appears to be around 0 7-1 1 since 2020    -creatinine was 1 1 upon admission   -creatinine with acute rise to 1 44     -status post IV contrast on 10/21/2022   -will hold ACE-inhibitor   -will check bladder scan   -UA:  Glucose, large blood, greater than 300 protein, innumerable rbc's/wbc's/bacteria   -most recent renal imaging with 1 or more simple renal cysts, otherwise unremarkable   -continue to avoid nephrotoxins, hypotension, IV contrast   -strict I/O  Hypertension:  Presented with hypertensive urgency  Patient admits to not always taking her medication due to cost   Blood pressure is above goal at times  -home medications:  Amlodipine 10 mg daily, lisinopril 40 mg daily, metoprolol succinate 25 mg daily  -would hold lisinopril for now  -recommend holding parameters antihypertensive for systolic blood pressure less than 130  Hypokalemia:  Suspect due to vomiting   -Mag level 2 0   -will continue to monitor and replace as needed  Urinary tract infection:  Cultures positive for E coli  -currently on antibiotics  Nonhealing left foot wound/PAD:  -podiatry and vascular  team following  Per Podiatry note, no indication for surgical intervention at this time  Will need follow-up MRI of foot  -currently on antibiotics  -would recommend pre and post hydration of further IV contrast is needed  Anemia:  -will check iron panel, although would avoid IV Venofer in the setting of infection   -continue to monitor and transfuse as needed for hemoglobin less than 7 0  Other:  Diabetes, depression        HISTORY OF PRESENT ILLNESS:  Requesting Physician: Chantelle Del Cid MD  Reason for Consult: NYDIA Trujillo is a 65y o  year old female with history of diabetes, hypertension, depression, who was admitted to [facility] after presenting with nonhealing left foot wound  The patient reports that the wound has been present for several months , however she salt but it would eventually heal   She admits taking several Advil due to lower extremity pain  She denies any fevers or chills  She denies chest discomfort  She does report having some shortness of breath with exertion which she attributes to generalized fatigue  She feels as if she has generalized decline over the past couple of months  She had episodes of nausea over the weekend along with previous episodes  She denies any diarrhea  She reports dysuria  She denies any previous renal history  She reports not always taking her medications as prescribed due to the high cost   A renal consultation is requested today for assistance in the management of acute kidney injury  PAST MEDICAL HISTORY:  Past Medical History:   Diagnosis Date   • Anxiety    • Depression    • Diabetes ([facility] )    • Diabetes mellitus ([facility] )    • Esophageal reflux     2015 RESOLVED   • Hyperlipidemia    • Hypertension    • Low back pain     sciatica   • Pneumonia    • Stroke ([facility] ) 2015    small vessel, L frontal corona radiata   Rx asa 81, Lipitor 40, risk modification   • Vitamin D deficiency        PAST SURGICAL HISTORY:  Past Surgical History:   Procedure Laterality Date   • COLONOSCOPY         ALLERGIES:  No Known Allergies    SOCIAL HISTORY:  Social History     Substance and Sexual Activity   Alcohol Use No    Comment: OCCASIONAL ALCOHOL USE IN ALL SCRIPTS     Social History     Substance and Sexual Activity   Drug Use No     Social History     Tobacco Use   Smoking Status Former Smoker   • Types: Cigarettes   • Quit date:    • Years since quittin 8   Smokeless Tobacco Never Used       FAMILY HISTORY:  Family History   Problem Relation Age of Onset   • Diabetes Mother    • Lung cancer Mother    • Cancer Father    • Lung cancer Father    • Hypertension Brother    • Hypertension Family        MEDICATIONS:    Current Facility-Administered Medications:   •  acetaminophen (TYLENOL) tablet 650 mg, 650 mg, Oral, Q6H PRN, Sugey Wiseman PA-C, 650 mg at 10/23/22 1751  •  ALPRAZolam (XANAX) tablet 0 25 mg, 0 25 mg, Oral, Daily PRN, Sugey Wiseman PA-C, 0 25 mg at 10/24/22 2250  •  amLODIPine (NORVASC) tablet 10 mg, 10 mg, Oral, Daily, Sugey Wiseman PA-C, 10 mg at 10/25/22 4454  •  aspirin chewable tablet 81 mg, 81 mg, Oral, Daily, Sugey Wiseman PA-C, 81 mg at 10/25/22 0850  •  carvedilol (COREG) tablet 12 5 mg, 12 5 mg, Oral, BID With Meals, Judd Brown DO, 12 5 mg at 10/25/22 3609  •  gabapentin (NEURONTIN) capsule 200 mg, 200 mg, Oral, BID, Sugey Wiseman PA-C, 200 mg at 10/25/22 0850  •  heparin (porcine) subcutaneous injection 5,000 Units, 5,000 Units, Subcutaneous, Q8H [facility] & long-term, 5,000 Units at 10/25/22 0525 **AND** [CANCELED] Platelet count, , , Once, Sugey Wiseman PA-C  •  hydrALAZINE (APRESOLINE) injection 10 mg, 10 mg, Intravenous, Q6H PRN, Sugey Wiseman PA-C, 10 mg at 10/22/22 1700  •  insulin glargine (LANTUS) subcutaneous injection 60 Units 0 6 mL, 60 Units, Subcutaneous, HS, Sugey Wiseman PA-C, 60 Units at 10/24/22 2249  •  insulin lispro (HumaLOG) 100 units/mL subcutaneous injection 1-6 Units, 1-6 Units, Subcutaneous, 4x Daily (AC & HS), 1 Units at 10/24/22 2249 **AND** Fingerstick Glucose (POCT), , , 4x Daily AC and at bedtime, Sugey Wiseman PA-C  •  insulin lispro (HumaLOG) 100 units/mL subcutaneous injection 5 Units, 5 Units, Subcutaneous, TID With Meals, Amy Parry MD, 5 Units at 10/25/22 0737  •  metoclopramide (REGLAN) injection 5 mg, 5 mg, Intravenous, Q6H PRN, Patel Duncan DO, 5 mg at 10/23/22 2018  •  morphine injection 4 mg, 4 mg, Intravenous, Q4H PRN, Aditya Tovar DO, 4 mg at 10/24/22 1952  •  [facility]etron [facility]) injection 4 mg, 4 mg, Intravenous, Q6H PRN, Sugey Wiseman PA-C, 4 mg at 10/22/22 2241  •  oxyCODONE (ROXICODONE) IR tablet 5 mg, 5 mg, Oral, Q4H PRN, Aditya Tovar DO, 5 mg at 10/25/22 2322  •  piperacillin-tazobactam (ZOSYN) 3 375 g in sodium chloride 0 9 % 100 mL IVPB, 3 375 g, Intravenous, Q6H, Sugey Wiseman PA-C, Last Rate: 200 mL/hr at 10/25/22 0448, 3 375 g at 10/25/22 0448  •  potassium chloride (K-DUR,KLOR-CON) CR tablet 40 mEq, 40 mEq, Oral, BID, Amy Parry MD  •  [COMPLETED] potassium chloride 20 mEq IVPB (premix), 20 mEq, Intravenous, Once, Stopped at 10/22/22 1014 **FOLLOWED BY** potassium chloride 20 mEq IVPB (premix), 20 mEq, Intravenous, Once, [employer] CompanyKETURAH, Held at 10/22/22 3960  •  sertraline (ZOLOFT) tablet 100 mg, 100 mg, Oral, Daily, Sugey Wiseman PA-C, 100 mg at 10/25/22 0850    REVIEW OF SYSTEMS:  A complete review of systems was performed and found to be negative unless otherwise noted in the history of present illness  General: No fevers, chills  Cardiovascular:  - chest pain, + leg edema  Respiratory: No cough, sputum production,  + shortness of breath  Gastrointestinal:  + nausea/vomiting,  - diarrhea,  - abdominal pain  Genitourinary: No hematuria  No foamy urine    No dysuria    PHYSICAL EXAM:  Current Weight:    First Weight:    Vitals:    10/24/22 0900 10/24/22 1544 10/24/22 2331 10/25/22 0710   BP: 124/63 167/66 127/59 160/70   BP Location: Right arm Right arm Right arm Right arm   Pulse: 78 86 87 87   Resp: 16 18 20 18   Temp: 97 9 °F (36 6 °C) 97 9 °F (36 6 °C) 98 5 °F (36 9 °C) 97 5 °F (36 4 °C)   TempSrc: Tympanic Temporal Temporal Temporal   SpO2: 94% 96% 93% 94%       Intake/Output Summary (Last 24 hours) at 10/25/2022 1116  Last data filed at 10/24/2022 1256  Gross per 24 hour   Intake 500 ml   Output --   Net 500 ml     General: NAD  Skin: warm, dry, intact, no rash  HEENT: Moist mucous membranes, sclera anicteric, normocephalic, atraumatic  Neck: No apparent JVD appreciated  Chest:lung sounds clear B/L, on RA   CVS:Regular rate and rhythm, no murmer   Abdomen: Soft, round, non-tender, +BS, obese  Extremities:  B/L LE edema present, left foot wrapped with Ace  Neuro: alert and oriented  Psych: appropriate mood and affect     Invasive Devices:      Lab Results:   Results from last 7 days   Lab Units 10/25/22  0524 10/24/22  0437 10/23/22  0706 10/23/22  0535 10/22/22  1420 10/22/22  0502 10/21/22  2108   WBC Thousand/uL 12 49* 12 61* 14 91*  --   --    < > 13 39*   HEMOGLOBIN g/dL 9 0* 9 0* 9 5*  --   --    < > 10 1*   HEMATOCRIT % 28 8* 28 9* 29 3*  --   --    < > 31 1*   PLATELETS Thousands/uL 329 332 338  --   --    < > 351   SODIUM mmol/L 139 141  --  141 140   < > 143   POTASSIUM mmol/L 3 2* 2 8*  --  2 9* 2 8*   < > 2 4*   CHLORIDE mmol/L 105 105  --  104 102   < > 102   CO2 mmol/L 25 28  --  27 30   < > 31   BUN mg/dL 19 19  --  15 14   < > 16   CREATININE mg/dL 1 43* 1 44*  --  1 27 1 26   < > 1 10   CALCIUM mg/dL 8 9 8 7  --  8 0* 8 7   < > 8 6   MAGNESIUM mg/dL  --  2 0  --  1 9 2 1   < > 1 6   ALK PHOS U/L  --  100  --   --   --   --  104   ALT U/L  --  26  --   --   --   --  15   AST U/L  --  39  --   --   --   --  15    < > = values in this interval not displayed

## 2022-10-25 NOTE — ASSESSMENT & PLAN NOTE
· In setting of uncontrolled diabetes   · Arterial duplex reviewed, "Great toe pressure of 35 mm Hg, below healing threshold for a diabetic"  · Will await further recommendations from Podiatry   · Continue Zosyn

## 2022-10-25 NOTE — ASSESSMENT & PLAN NOTE
Present on admission with Leucocytosis and tachycardia  Due to left foot wound  Resolved  Continued on IV Zosyn  F/u blood cultures, no growth 72 hours

## 2022-10-25 NOTE — PROGRESS NOTES
[facility] Podiatry - Progress Note  Patient: Ashwin Poole 65 y o  female   MRN: [id number]  PCP: ALEX Rowan  Unit/Bed#: E2 -33 Encounter: [id number]  Date Of Visit: 10/25/22    ASSESSMENT:    Ashwin Poole is a 65 y o  female with:    1  Diabetic ulcer left heel  2  Eschar / ischemic change right great toe and 5th toe  3  Type II diabetes with PAD    PLAN:    · Local wound care performed today consisting of Betadine, DSD to left foot and Betadine open to air right foot  Wounds appear stable with no local signs of infection  · No urgent need for podiatric surgical intervention at this time, however the patient understands that she is at high risk for limb loss secondary to her type 2 diabetes, peripheral arterial disease  · LEADs reviewed:  Right lower extremity SHARIF 0 8 with great toe pressure of 61  Left lower extremity SHARIF 0 63 with great toe pressure of 35, below the healing threshold for a diabetic  Bilateral sides with diffuse proximal disease  · Vascular surgery consult placed, appreciate their recommendations  · Follow-up MRI left foot  · Continue local wound care, appreciate nursing assistance with dressing changes  · Elevation on green foam wedges or pillows when non-ambulatory  · Rest of care per primary team     Weight bearing status:  Nonweightbearing left foot, weight-bearing as tolerated right foot    SUBJECTIVE:     The patient was seen, evaluated, and assessed at bedside today  The patient was awake, alert, and in no acute distress  No acute events overnight  The patient reports mild to moderate pain with dressing changes  Patient denies N/V/F/chills/SOB/CP  OBJECTIVE:     Vitals:   /70 (BP Location: Right arm)   Pulse 87   Temp 97 5 °F (36 4 °C) (Temporal)   Resp 18   SpO2 94%     Temp (24hrs), Av °F (36 7 °C), Min:97 5 °F (36 4 °C), Max:98 5 °F (36 9 °C)      Physical Exam:     General:  Alert, cooperative, and in no distress    Lower extremity exam:  Cardiovascular status at baseline  Neurological status at baseline  Musculoskeletal status at baseline  No calf tenderness noted  Lower extremity wound(s) as noted below:  Wound #:  1  Location:  Left heel  Length 7 0cm: Width 5 0 cm: Depth unknown:   Deepest Tissue Noted in Base:  Unknown  Probe to Bone: No  Peripheral Skin Description: Attached  Granulation: 0% Fibrotic Tissue: 0% Necrotic Tissue: 100%   Drainage Amount: None  Signs of Infection: No     Also of note, there are multiple small eschars present throughout the bilateral lower extremities including the left medial leg and digits  The right 5th digit presents with dry gangrene to the distal aspect with ischemic changes noted to extend proximally around the 4th and 5th MTPJ    Clinical Images 10/25/22:                  Additional Data:     Labs:    Results from last 7 days   Lab Units 10/25/22  0524   WBC Thousand/uL 12 49*   HEMOGLOBIN g/dL 9 0*   HEMATOCRIT % 28 8*   PLATELETS Thousands/uL 329   NEUTROS PCT % 71   LYMPHS PCT % 16   MONOS PCT % 6   EOS PCT % 5     Results from last 7 days   Lab Units 10/25/22  0524 10/24/22  0437   POTASSIUM mmol/L 3 2* 2 8*   CHLORIDE mmol/L 105 105   CO2 mmol/L 25 28   BUN mg/dL 19 19   CREATININE mg/dL 1 43* 1 44*   CALCIUM mg/dL 8 9 8 7   ALK PHOS U/L  --  100   ALT U/L  --  26   AST U/L  --  39     Results from last 7 days   Lab Units 10/21/22  2108   INR  1 00       * I Have Reviewed All Lab Data Listed Above  Recent Cultures (last 7 days):     Results from last 7 days   Lab Units 10/21/22  2128 10/21/22  2108 10/21/22  2107   BLOOD CULTURE   --  No Growth at 72 hrs  No Growth at 72 hrs  URINE CULTURE  >100,000 cfu/ml Escherichia coli*  --   --            Imaging: I have personally reviewed pertinent films in PACS  EKG, Pathology, and Other Studies: I have personally reviewed pertinent reports  ** Please Note: Portions of the record may have been created with voice recognition software  Occasional wrong word or "sound a like" substitutions may have occurred due to the inherent limitations of voice recognition software  Read the chart carefully and recognize, using context, where substitutions have occurred   **

## 2022-10-25 NOTE — ASSESSMENT & PLAN NOTE
· Secondary to vomiting, improved with replacement   · Continue replacement and monitoring     Results from last 7 days   Lab Units 10/25/22  0524 10/24/22  0437 10/23/22  0535 10/22/22  1420 10/22/22  0502 10/21/22  2108   POTASSIUM mmol/L 3 2* 2 8* 2 9* 2 8* 2 4* 2 4*

## 2022-10-25 NOTE — ASSESSMENT & PLAN NOTE
· E Coli UTI, final cultures and sensitivities pending  Reports improvement in symptoms   Remains on Zosyn for foot infection

## 2022-10-25 NOTE — PLAN OF CARE
Problem: OCCUPATIONAL THERAPY ADULT  Goal: Performs self-care activities at highest level of function for planned discharge setting  See evaluation for individualized goals  Description: Treatment Interventions: ADL retraining, Functional transfer training, UE strengthening/ROM, Endurance training, Cognitive reorientation, Patient/family training, Equipment evaluation/education, Compensatory technique education          See flowsheet documentation for full assessment, interventions and recommendations  Note: Limitation: Decreased ADL status, Decreased UE strength, Decreased Safe judgement during ADL, Decreased endurance, Decreased high-level ADLs  Prognosis: Good  Assessment: Pt is a 64y/o female admitted to the hospital 2* L heel wound, R 5th digit wound  Pt noted with DM L heel ulcer, R toe ischemic changes  Podiatry recommending NWB L LE, WBAT R LE  Pt with PMH anxiety, DM, depression, HTN, LBP, CVA, and PAD  PTA pt states independence with her ADLs, transfers, ambulation--ocassional use of SPC, "but I think I was using it wrong"; neg falls, +  During initial eval, pt demonstrated deficits with her functional balance, functional mobility, transfer safety(i e unable to demonstrate compliance with L LE NWB status), ADL status, b/l UE strength, and activity tolerance(currently fair=15-20mins)  Pt would benefit from continued OT tx for the above deficits  3-5xwk/1-2wks  The patient's raw score on the AM-PAC Daily Activity inpatient short form is 19, standardized score is 40 22, greater than 39 4  Patients at this level are likely to benefit from discharge to post-acute rehabilitation services  Please refer to the recommendation of the Occupational Therapist for safe discharge planning       OT Discharge Recommendation: Post acute rehabilitation services

## 2022-10-25 NOTE — ASSESSMENT & PLAN NOTE
Arterial Duplex reveals "Great toe pressure of 35 mm Hg, below healing threshold for a diabetic "  Awaiting Podiatry input regarding considerations for Vascular surgery intervention

## 2022-10-25 NOTE — PROGRESS NOTES
[facility]  Progress Note - [facility] 1960, 65 y o  female MRN: [id number]  Unit/Bed#: E2 -01 Encounter: [id number]  Primary Care Provider: ALEX Will   Date and time admitted to hospital: 10/21/2022  7:58 PM    * Non healing left heel wound  Assessment & Plan  · In setting of uncontrolled diabetes   · Arterial duplex reviewed, "Great toe pressure of 35 mm Hg, below healing threshold for a diabetic"  · Will await further recommendations from Podiatry   · Continue Zosyn    Sepsis [facility])  Assessment & Plan  Present on admission with Leucocytosis and tachycardia  Due to left foot wound  Resolved  Continued on IV Zosyn  F/u blood cultures, no growth 72 hours      PAD (peripheral artery disease) ([facility] [address] )  Assessment & Plan  Arterial Duplex reveals "Great toe pressure of 35 mm Hg, below healing threshold for a diabetic "  Awaiting Podiatry input regarding considerations for Vascular surgery intervention          UTI (urinary tract infection)  Assessment & Plan  · E Coli UTI, final cultures and sensitivities pending  Reports improvement in symptoms  Remains on Zosyn for foot infection    Hypokalemia  Assessment & Plan  · Secondary to vomiting, improved with replacement   · Continue replacement and monitoring     Results from last 7 days   Lab Units 10/25/22  0524 10/24/22  0437 10/23/22  0535 10/22/22  1420 10/22/22  0502 10/21/22  2108   POTASSIUM mmol/L 3 2* 2 8* 2 9* 2 8* 2 4* 2 4*       Hypertensive urgency  Assessment & Plan  · Felt to be due to patient underdosing her meds because of cost   · Toprol changed to carvedilol with good response    · Continue lisinopril 40 mg and amlodipine 10 mg daily    N&V (nausea and vomiting)  Assessment & Plan  Resolved  Continue supportive care  Monitor electrolytes     Type 2 diabetes mellitus with hyperglycemia, with long-term current use of insulin [facility])  Assessment & Plan  Lab Results   Component Value Date    HGBA1C 9 4 (A) 2022     Recent Labs     10/24/22  1104 10/24/22  1544 10/24/22  2150 10/25/22  0712   POCGLU 178* 148* 177* 147*     · Uncontrolled based on prior A1C, will update  · Apparently has been rationing glargine due to cost  · Glargine restarted at 60 units  Added pre-meal Humalog 5 units TID  Blood glucose in acceptable range  Continue sliding scale  Depression, recurrent ([address] )  Assessment & Plan  · Controlled  · Continue sertraline, Xanax PRN      VTE Pharmacologic Prophylaxis: VTE Score: 4 Moderate Risk (Score 3-4) - Pharmacological DVT Prophylaxis Ordered: heparin  Patient Centered Rounds: I performed bedside rounds with nursing staff today  Discussions with Specialists or Other Care Team Provider: will d/w Podiatry     Education and Discussions with Family / Patient: Patient   Time Spent for Care: 45 minutes  More than 50% of total time spent on counseling and coordination of care as described above  Current Length of Stay: 3 day(s)  Current Patient Status: Inpatient   Certification Statement: The patient will continue to require additional inpatient hospital stay due to IV Rx, potential intervention   Discharge Plan: Anticipate discharge in >72 hrs to rehab facility  Code Status: Level 1 - Full Code    Subjective:   Patient seen and examined  No complaints, no overnight events  Objective:     Vitals:   Temp (24hrs), Av °F (36 7 °C), Min:97 5 °F (36 4 °C), Max:98 5 °F (36 9 °C)    Temp:  [97 5 °F (36 4 °C)-98 5 °F (36 9 °C)] 97 5 °F (36 4 °C)  HR:  [86-87] 87  Resp:  [18-20] 18  BP: (127-167)/(59-70) 160/70  SpO2:  [93 %-96 %] 94 %  There is no height or weight on file to calculate BMI  Input and Output Summary (last 24 hours):      Intake/Output Summary (Last 24 hours) at 10/25/2022 1001  Last data filed at 10/24/2022 1256  Gross per 24 hour   Intake 500 ml   Output --   Net 500 ml       Physical Exam:   Physical Exam  Constitutional:       General: She is not in acute distress  HENT:      Head: Normocephalic and atraumatic  Nose: No congestion  Mouth/Throat:      Pharynx: Oropharynx is clear  Eyes:      Conjunctiva/sclera: Conjunctivae normal    Cardiovascular:      Rate and Rhythm: Normal rate and regular rhythm  Heart sounds: No murmur heard  Pulmonary:      Effort: No respiratory distress  Breath sounds: No wheezing or rales  Abdominal:      General: There is no distension  Tenderness: There is no abdominal tenderness  There is no guarding  Musculoskeletal:      Right lower leg: No edema  Left lower leg: No edema  Skin:     General: Skin is warm and dry  Comments: L foot dressing intact    Neurological:      Mental Status: She is oriented to person, place, and time     Psychiatric:         Mood and Affect: Mood normal           Additional Data:     Labs:  Results from last 7 days   Lab Units 10/25/22  0524   WBC Thousand/uL 12 49*   HEMOGLOBIN g/dL 9 0*   HEMATOCRIT % 28 8*   PLATELETS Thousands/uL 329   NEUTROS PCT % 71   LYMPHS PCT % 16   MONOS PCT % 6   EOS PCT % 5     Results from last 7 days   Lab Units 10/25/22  0524 10/24/22  0437   SODIUM mmol/L 139 141   POTASSIUM mmol/L 3 2* 2 8*   CHLORIDE mmol/L 105 105   CO2 mmol/L 25 28   BUN mg/dL 19 19   CREATININE mg/dL 1 43* 1 44*   ANION GAP mmol/L 9 8   CALCIUM mg/dL 8 9 8 7   ALBUMIN g/dL  --  2 2*   TOTAL BILIRUBIN mg/dL  --  0 24   ALK PHOS U/L  --  100   ALT U/L  --  26   AST U/L  --  39   GLUCOSE RANDOM mg/dL 106 86     Results from last 7 days   Lab Units 10/21/22  2108   INR  1 00     Results from last 7 days   Lab Units 10/25/22  0712 10/24/22  2150 10/24/22  1544 10/24/22  1104 10/24/22  0716 10/23/22  2125 10/23/22  1521 10/23/22  1128 10/23/22  0637 10/22/22  2125 10/22/22  1611 10/22/22  1104   POC GLUCOSE mg/dl 147* 177* 148* 178* 165* 197* 198* 161* 231* 165* 218* 312*         Results from last 7 days   Lab Units 10/21/22  2108   LACTIC ACID mmol/L 0 5 PROCALCITONIN ng/ml 0 06       Lines/Drains:  Invasive Devices  Report    Peripheral Intravenous Line  Duration           Peripheral IV 10/24/22 Left;Ventral (anterior) Forearm 1 day                      Imaging: reviewed b/l arterial duplex     Recent Cultures (last 7 days):   Results from last 7 days   Lab Units 10/21/22  2128 10/21/22  2108 10/21/22  2107   BLOOD CULTURE   --  No Growth at 72 hrs  No Growth at 72 hrs     URINE CULTURE  >100,000 cfu/ml Escherichia coli*  --   --        Last 24 Hours Medication List:   Current Facility-Administered Medications   Medication Dose Route Frequency Provider Last Rate   • acetaminophen  650 mg Oral Q6H PRN Sugey Wiseman PA-C     • ALPRAZolam  0 25 mg Oral Daily PRN Sugey Wiseman PA-C     • amLODIPine  10 mg Oral Daily Sugey Wiseman PA-C     • aspirin  81 mg Oral Daily Sugey Wiseman PA-C     • carvedilol  12 5 mg Oral BID With Meals Karl Campos DO     • gabapentin  200 mg Oral BID Sugey Wiseman PA-C     • heparin (porcine)  5,000 Units Subcutaneous Q8H [address] Sugey Wiseman PA-C     • hydrALAZINE  10 mg Intravenous Q6H PRN Sugey Wiseman PA-C     • insulin glargine  60 Units Subcutaneous HS Sugey Wiseman PA-C     • insulin lispro  1-6 Units Subcutaneous 4x Daily (AC & HS) Sugey Wiseman PA-C     • insulin lispro  5 Units Subcutaneous TID With Meals Amy العلي MD     • metoclopramide  5 mg Intravenous Q6H PRN Karl Campos DO     • morphine injection  4 mg Intravenous Q4H PRN Karl Campos DO     • ondansetron  4 mg Intravenous Q6H PRN Sugey Wiseman PA-C     • oxyCODONE  5 mg Oral Q4H PRN Kalr Campos DO     • piperacillin-tazobactam  3 375 g Intravenous Q6H Sugey iWseman PA-C 3 375 g (10/25/22 0448)   • potassium chloride  40 mEq Oral BID Amy Parry MD     • potassium chloride  20 mEq Intravenous Once Colden Alex, PA-C Stopped (10/22/22 2099)   • sertraline  100 mg Oral Daily Sugey MENDOZA KETURAH Wiseman          Today, Patient Was Seen By: Jorje Suggs    **Please Note: This note may have been constructed using a voice recognition system  **

## 2022-10-25 NOTE — OCCUPATIONAL THERAPY NOTE
Occupational Therapy Evaluation(time=3948-5601)     Patient Name: Lionel Mayes  [id number]'[id number] Date: 10/25/2022  Problem List  Principal Problem:    Non healing left heel wound  Active Problems:    Depression, recurrent ([place] [address] )    Type 2 diabetes mellitus with hyperglycemia, with long-term current use of insulin (HCC)    Wound of lower extremity    N&V (nausea and vomiting)    Sepsis (HCC)    Abnormal EKG    Hypertensive urgency    Hypokalemia    UTI (urinary tract infection)    PAD (peripheral artery disease) ([place] [address] )    Past Medical History  Past Medical History:   Diagnosis Date    Anxiety     Depression     Diabetes ([facility] )     Diabetes mellitus ([place] [address] )     Esophageal reflux     28 APR 2015 RESOLVED    Hyperlipidemia     Hypertension     Low back pain     sciatica    Pneumonia 2019    Stroke ([place] [address] ) 12/2015    small vessel, L frontal corona radiata   Rx asa 81, Lipitor 40, risk modification    Vitamin D deficiency      Past Surgical History  Past Surgical History:   Procedure Laterality Date    COLONOSCOPY             10/25/22 0958   Note Type   Note type Evaluation   Pain Assessment   Pain Assessment Tool 0-10   Pain Score 8  (8/10-L LE, 7/10-R LE)   Pain Location/Orientation Orientation: Bilateral;Location: Leg   Restrictions/Precautions   Weight Bearing Precautions Per Order Yes   RLE Weight Bearing Per Order WBAT   LLE Weight Bearing Per Order NWB   Other Precautions Fall Risk;Pain;WBS   Home Living   Type of [address] [place] [address] One level  (3 brinda)   Bathroom Shower/Tub Walk-in shower   Bathroom Toilet Standard   Bathroom Equipment Grab bars around toilet   [address] in the last 6 months 0   Comments PTA pt states independence with her ADLs, transfers, ambulation--ocassional use of SPC, "but I think I was using it wrong"; neg falls, +   Lifestyle   Autonomy PTA pt states independence with her ADLs, transfers, ambulation--ocassional use of SPC, "but I think I was using it wrong"; neg falls, +   Reciprocal Relationships supportive brother, ACE, dtr   Service to Others worked for [address] spending time with family   Subjective   Subjective "I know I haven't been taking care of myself "   ADL   Where Assessed Chair   Eating Assistance 6  Modified independent   Grooming Assistance 6  Modified Independent   UB Bathing Assistance 5  Supervision/Setup   LB Bathing Assistance 3  Moderate Assistance   [address] 5  Supervision/Setup    [facility] 3  Moderate [address]  5  Supervision/Setup   Transfers   Sit to Stand 4  Minimal assistance   Additional items Assist x 1; Increased time required;Verbal cues   Stand to Sit 4  Minimal assistance   Additional items Assist x 1; Increased time required;Verbal cues   Functional Mobility   Functional Mobility 4  Minimal assistance   Additional Comments x1   Additional items Rolling walker   Balance   Static Sitting Good   Dynamic Sitting Fair +   Static Standing Fair -   Dynamic Standing Poor +   Activity Tolerance   Activity Tolerance Patient limited by fatigue;Patient limited by pain   Medical Staff Made Aware nsg, P T    RUE Assessment   RUE Assessment WFL   RUE Strength   RUE Overall Strength Within Functional Limits - able to perform ADL tasks with strength  (4/5 throughout)   LUE Assessment   LUE Assessment WFL   LUE Strength   LUE Overall Strength Within Functional Limits - able to perform ADL tasks with strength  (4/5 throughout)   Hand Function   Gross Motor Coordination Functional   Fine Motor Coordination Functional   Sensation   Light Touch No apparent deficits   Proprioception   Proprioception No apparent deficits   Vision-Basic Assessment   Current Vision   (glasses)   Vision - Complex Assessment   Acuity   (impaired)   Psychosocial   Psychosocial (WDL) WDL   Perception   Inattention/Neglect Appears intact   Cognition   Overall Cognitive Status [facility] Arousal/Participation Alert   Attention Within functional limits   Orientation Level Oriented X4   Memory Within functional limits   Following Commands Follows all commands and directions without difficulty   Assessment   Limitation Decreased ADL status; Decreased UE strength;Decreased Safe judgement during ADL;Decreased endurance;Decreased high-level ADLs   Prognosis Good   Assessment Pt is a 66y/o female admitted to the hospital 2* L heel wound, R 5th digit wound  Pt noted with DM L heel ulcer, R toe ischemic changes  Podiatry recommending NWB L LE, WBAT R LE  Pt with PMH anxiety, DM, depression, HTN, LBP, CVA, and PAD  PTA pt states independence with her ADLs, transfers, ambulation--ocassional use of SPC, "but I think I was using it wrong"; neg falls, +  During initial eval, pt demonstrated deficits with her functional balance, functional mobility, transfer safety(i e unable to demonstrate compliance with L LE NWB status), ADL status, b/l UE strength, and activity tolerance(currently fair=15-20mins)  Pt would benefit from continued OT tx for the above deficits  3-5xwk/1-2wks  The patient's raw score on the AM-PAC Daily Activity inpatient short form is 19, standardized score is 40 22, greater than 39 4  Patients at this level are likely to benefit from discharge to post-acute rehabilitation services  Please refer to the recommendation of the Occupational Therapist for safe discharge planning  Goals   Patient Goals "to get better"   STG Time Frame   (1-7 days)   Short Term Goal #1 Pt will demonstrate improved activity tolerance to good(20-30mins) and standing tolerance 1-3mins(maintaining L LE NWB status) to assist with ADLs  Short Term Goal #2 Pt will demonstrate proper walker/transfer safety(maintaining L LE NWB status)  100% of the time  Short Term Goal  Pt will demonstrate mod I with their sit-stand transfers to assist with completion of their LE dressing     LTG Time Frame   (7-14 days)   Long Term Goal #1 Pt will demonstrate g/g- balance with all functional activities  Long Term Goal #2 Pt will demonstrate mod I with their UE and LE bathing/dresssing  Long Term Goal Pt will demonstrate improved b/l UE strength by 1/2 MM grade to assist with ADLs/transfers  Plan   Treatment Interventions ADL retraining;Functional transfer training;UE strengthening/ROM; Endurance training;Cognitive reorientation;Patient/family training;Equipment evaluation/education; Compensatory technique education   Goal Expiration Date 11/08/22   OT Treatment Day 0   OT Frequency 3-5x/wk   Recommendation   OT Discharge Recommendation Post acute rehabilitation services   AM-PAC Daily Activity Inpatient   Lower Body Dressing 2   Bathing 2   Toileting 3   Upper Body Dressing 4   Grooming 4   Eating 4   Daily Activity Raw Score 19   Daily Activity Standardized Score (Calc for Raw Score >=11) 40 22   AM-PAC Applied Cognition Inpatient   Following a Speech/Presentation 4   Understanding Ordinary Conversation 4   Taking Medications 3   Remembering Where Things Are Placed or Put Away 4   Remembering List of 4-5 Errands 4   Taking Care of Complicated Tasks 4   Applied Cognition Raw Score 23   Applied Cognition Standardized Score 53 08   Prabhjot Bruce

## 2022-10-25 NOTE — PLAN OF CARE
Problem: PAIN - ADULT  Goal: Verbalizes/displays adequate comfort level or baseline comfort level  Description: Interventions:  - Encourage patient to monitor pain and request assistance  - Assess pain using appropriate pain scale  - Administer analgesics based on type and severity of pain and evaluate response  - Implement non-pharmacological measures as appropriate and evaluate response  - Consider cultural and social influences on pain and pain management  - Notify physician/advanced practitioner if interventions unsuccessful or patient reports new pain  Outcome: Progressing     Problem: METABOLIC, FLUID AND ELECTROLYTES - ADULT  Goal: Electrolytes maintained within normal limits  Description: INTERVENTIONS:  - Monitor labs and assess patient for signs and symptoms of electrolyte imbalances  - Administer electrolyte replacement as ordered  - Monitor response to electrolyte replacements, including repeat lab results as appropriate  - Instruct patient on fluid and nutrition as appropriate  Outcome: Progressing  Goal: Fluid balance maintained  Description: INTERVENTIONS:  - Monitor labs   - Monitor I/O and WT  - Instruct patient on fluid and nutrition as appropriate  - Assess for signs & symptoms of volume excess or deficit  Outcome: Progressing  Goal: Glucose maintained within target range  Description: INTERVENTIONS:  - Monitor Blood Glucose as ordered  - Assess for signs and symptoms of hyperglycemia and hypoglycemia  - Administer ordered medications to maintain glucose within target range  - Assess nutritional intake and initiate nutrition service referral as needed  Outcome: Progressing     Problem: SKIN/TISSUE INTEGRITY - ADULT  Goal: Pressure injury heals and does not worsen  Description: Interventions:  - Implement low air loss mattress or specialty surface (Criteria met)  - Apply silicone foam dressing  - Instruct/assist with weight shifting every 120  minutes when in chair   - Limit chair time to 2 hour intervals  - Use special pressure reducing interventions such as waffle cushion when in chair   - Apply fecal or urinary incontinence containment device   - Perform passive or active ROM every 4 hours  - Turn and reposition patient & offload bony prominences every 2 hours   - Utilize friction reducing device or surface for transfers   - Consider consults to  interdisciplinary teams such as PT/OT  - Use incontinent care products after each incontinent episode such as barrier cream  Problem: Knowledge Deficit  Goal: Patient/family/caregiver demonstrates understanding of disease process, treatment plan, medications, and discharge instructions  Description: Complete learning assessment and assess knowledge base    Interventions:  - Provide teaching at level of understanding  - Provide teaching via preferred learning methods  Outcome: Progressing     Problem: INFECTION - ADULT  Goal: Absence or prevention of progression during hospitalization  Description: INTERVENTIONS:  - Assess and monitor for signs and symptoms of infection  - Monitor lab/diagnostic results  - Monitor all insertion sites, i e  indwelling lines, tubes, and drains  - Monitor endotracheal if appropriate and nasal secretions for changes in amount and color  - Prosperity appropriate cooling/warming therapies per order  - Administer medications as ordered  - Instruct and encourage patient and family to use good hand hygiene technique  - Identify and instruct in appropriate isolation precautions for identified infection/condition  Outcome: Progressing  Goal: Absence of fever/infection during neutropenic period  Description: INTERVENTIONS:  - Monitor WBC    Outcome: Progressing     - Consider nutrition services referral as needed  Outcome: Progressing

## 2022-10-25 NOTE — PLAN OF CARE
Problem: PHYSICAL THERAPY ADULT  Goal: Performs mobility at highest level of function for planned discharge setting  See evaluation for individualized goals  Description: Treatment/Interventions: Functional transfer training, LE strengthening/ROM, Therapeutic exercise, Endurance training, Patient/family training, Equipment eval/education, Bed mobility, Compensatory technique education, Gait training, Continued evaluation, Spoke to nursing, Spoke to MD, Spoke to case management, OT          See flowsheet documentation for full assessment, interventions and recommendations  10/25/2022 1338 by Barbara Soto, PT  Note: Prognosis: Fair  Problem List: Decreased strength, Decreased endurance, Impaired balance, Decreased mobility, Obesity, Decreased skin integrity, Pain  Assessment: Luda Ulloa is a 65 y o  female admitted to [facility] on 10/21/2022 for Non healing left heel wound  At high risk for limb loss per podiatry  PT was consulted and pt was seen on 10/25/2022 for mobility assessment and d/c planning  Pt presents w NWB LLE, WBAT RLE, high fall risk, subacute pain in bl feet  Pt reports at baseline is indep without an AD however has been experiencing increased difficulty at home since the summer  Educated pt on NWB LLE status however given RLE pain and weakness as well as BUE weakness pt unable to maintain NWB during transfers or ambulation  Ambulated back to bathroom TTWB on L  Was able to static stand while maintaining NWB L however tolerance limited by RLE pain and weakness  Pt also requires assist to perform dynamic sitting and standing tasks as part of ADLs (toileting, LBD)  Currently does not demonstrate ability to d/c home at current LOF  Encouraged stand piv transf bed<>chair/BSC only during hospital stay while NWB to promote compliance   Given difficulty hopping would consider emphasis on alternate methods of mobility to comply w NWB specifically slide board or sit pivot to wc as house is wc accessible  I do not think pt will tolerate knee sling or Rollabout at this time given RLE pain and weakness  Pt will benefit from continued skilled IP PT to address the above mentioned impairments  in order to maximize recovery and increase functional independence when completing mobility and ADLs  At this time PT recommendations for d/c are STR given social and environmental barriers and need for further mobility training given NWB LLE  Barriers to Discharge: Inaccessible home environment, Decreased caregiver support  Barriers to Discharge Comments: steps, lives alone  PT Discharge Recommendation: Post acute rehabilitation services    See flowsheet documentation for full assessment  10/25/2022 1338 by Fabiola Gillis, PT  Note: Prognosis: Fair  Problem List: Decreased strength, Decreased endurance, Impaired balance, Decreased mobility, Obesity, Decreased skin integrity, Pain  Assessment: Ashwin Poole is a 65 y o  female admitted to [facility] on 10/21/2022 for Non healing left heel wound  At high risk for limb loss per podiatry  PT was consulted and pt was seen on 10/25/2022 for mobility assessment and d/c planning  Pt presents w NWB LLE, WBAT RLE, high fall risk, subacute pain in bl feet  Pt reports at baseline is indep without an AD however has been experiencing increased difficulty at home since the summer  Educated pt on NWB LLE status however given RLE pain and weakness as well as BUE weakness pt unable to maintain NWB during transfers or ambulation  Ambulated back to bathroom TTWB on L  Was able to static stand while maintaining NWB L however tolerance limited by RLE pain and weakness  Pt also requires assist to perform dynamic sitting and standing tasks as part of ADLs (toileting, LBD)  Currently does not demonstrate ability to d/c home at current LOF  Encouraged stand piv transf bed<>chair/BSC only during hospital stay while NWB to promote compliance   Given difficulty hopping would consider emphasis on alternate methods of mobility to comply w NWB specifically slide board or sit pivot to wc as house is wc accessible  I do not think pt will tolerate knee sling or Rollabout at this time given RLE pain and weakness  Pt will benefit from continued skilled IP PT to address the above mentioned impairments  in order to maximize recovery and increase functional independence when completing mobility and ADLs  At this time PT recommendations for d/c are STR given social and environmental barriers and need for further mobility training given NWB LLE  Barriers to Discharge: Inaccessible home environment, Decreased caregiver support  Barriers to Discharge Comments: steps, lives alone  PT Discharge Recommendation: Post acute rehabilitation services    See flowsheet documentation for full assessment

## 2022-10-25 NOTE — ASSESSMENT & PLAN NOTE
65year old female former smoker w/HTN, HLD, uncontrolled type II DM (A1c 9 8), hx CVA, presenting with nonhealing extensive L heel ulceration and R hallux and 5th digit ulceration  Vascular surgery consulted for input  TARI duplex 10/24/22: Rt- monophasic waveforms of the CFA indicative of proximal disease, SHARIF 0 80/64/61  Lt- diffuse disease without focal stenosis, SHARIF 0 61/21/35    sCr/eGFR: 1 43/39  ABC: 12 49     Recommendations:  -Bilateral tissue loss in the setting of uncontrolled type II DM and NYDIA on admission  -Diminished femoral pulses bilaterally on exam  -Extensive L heel tissue loss with R hallux and 5th digit tissue loss  Patient will likely require bilateral intervention  Questionable salvageability of the LLE   -Given diminished femoral pulses bilaterally, recommend CTA of the abdomen with run off   With NYDIA on admission, will ask for Nephrology assistance for optimization prior  -Medical management with ASA, will add statin to medical regimen  -Continue local wound care per podiatry  -Medical management and glucose control per SLIM  -Follow up Nephrology recommendations for CTA preparation   -D/w Dr Savannah Rosales

## 2022-10-25 NOTE — ASSESSMENT & PLAN NOTE
Lab Results   Component Value Date    HGBA1C 9 4 (A) 03/16/2022     Recent Labs     10/24/22  1104 10/24/22  1544 10/24/22  2150 10/25/22  0712   POCGLU 178* 148* 177* 147*     · Uncontrolled based on prior A1C, will update  · Apparently has been rationing glargine due to cost  · Glargine restarted at 60 units  Added pre-meal Humalog 5 units TID  Blood glucose in acceptable range  Continue sliding scale

## 2022-10-26 ENCOUNTER — APPOINTMENT (INPATIENT)
Dept: RADIOLOGY | Facility: HOSPITAL | Age: 62
DRG: 853 | End: 2022-10-26
Payer: COMMERCIAL

## 2022-10-26 LAB
ANION GAP SERPL CALCULATED.3IONS-SCNC: 8 MMOL/L (ref 4–13)
BACTERIA BLD CULT: NORMAL
BACTERIA BLD CULT: NORMAL
BASOPHILS # BLD AUTO: 0.08 THOUSANDS/ÂΜL (ref 0–0.1)
BASOPHILS NFR BLD AUTO: 1 % (ref 0–1)
BUN SERPL-MCNC: 16 MG/DL (ref 5–25)
CALCIUM SERPL-MCNC: 8.6 MG/DL (ref 8.3–10.1)
CHLORIDE SERPL-SCNC: 105 MMOL/L (ref 96–108)
CO2 SERPL-SCNC: 27 MMOL/L (ref 21–32)
CREAT SERPL-MCNC: 1.17 MG/DL (ref 0.6–1.3)
EOSINOPHIL # BLD AUTO: 0.44 THOUSAND/ÂΜL (ref 0–0.61)
EOSINOPHIL NFR BLD AUTO: 3 % (ref 0–6)
ERYTHROCYTE [DISTWIDTH] IN BLOOD BY AUTOMATED COUNT: 14.6 % (ref 11.6–15.1)
GFR SERPL CREATININE-BSD FRML MDRD: 50 ML/MIN/1.73SQ M
GLUCOSE SERPL-MCNC: 105 MG/DL (ref 65–140)
GLUCOSE SERPL-MCNC: 137 MG/DL (ref 65–140)
GLUCOSE SERPL-MCNC: 140 MG/DL (ref 65–140)
GLUCOSE SERPL-MCNC: 79 MG/DL (ref 65–140)
GLUCOSE SERPL-MCNC: 90 MG/DL (ref 65–140)
HCT VFR BLD AUTO: 32.9 % (ref 34.8–46.1)
HGB BLD-MCNC: 10.5 G/DL (ref 11.5–15.4)
IMM GRANULOCYTES # BLD AUTO: 0.11 THOUSAND/UL (ref 0–0.2)
IMM GRANULOCYTES NFR BLD AUTO: 1 % (ref 0–2)
LYMPHOCYTES # BLD AUTO: 1.73 THOUSANDS/ÂΜL (ref 0.6–4.47)
LYMPHOCYTES NFR BLD AUTO: 12 % (ref 14–44)
MCH RBC QN AUTO: 27.9 PG (ref 26.8–34.3)
MCHC RBC AUTO-ENTMCNC: 31.9 G/DL (ref 31.4–37.4)
MCV RBC AUTO: 87 FL (ref 82–98)
MONOCYTES # BLD AUTO: 0.9 THOUSAND/ÂΜL (ref 0.17–1.22)
MONOCYTES NFR BLD AUTO: 6 % (ref 4–12)
NEUTROPHILS # BLD AUTO: 10.93 THOUSANDS/ÂΜL (ref 1.85–7.62)
NEUTS SEG NFR BLD AUTO: 77 % (ref 43–75)
NRBC BLD AUTO-RTO: 0 /100 WBCS
PLATELET # BLD AUTO: 370 THOUSANDS/UL (ref 149–390)
PMV BLD AUTO: 8.9 FL (ref 8.9–12.7)
POTASSIUM SERPL-SCNC: 3.8 MMOL/L (ref 3.5–5.3)
RBC # BLD AUTO: 3.77 MILLION/UL (ref 3.81–5.12)
SODIUM SERPL-SCNC: 140 MMOL/L (ref 135–147)
WBC # BLD AUTO: 14.19 THOUSAND/UL (ref 4.31–10.16)

## 2022-10-26 PROCEDURE — 85025 COMPLETE CBC W/AUTO DIFF WBC: CPT | Performed by: FAMILY MEDICINE

## 2022-10-26 PROCEDURE — 99232 SBSQ HOSP IP/OBS MODERATE 35: CPT | Performed by: FAMILY MEDICINE

## 2022-10-26 PROCEDURE — 99232 SBSQ HOSP IP/OBS MODERATE 35: CPT | Performed by: INTERNAL MEDICINE

## 2022-10-26 PROCEDURE — 94640 AIRWAY INHALATION TREATMENT: CPT

## 2022-10-26 PROCEDURE — 80048 BASIC METABOLIC PNL TOTAL CA: CPT | Performed by: FAMILY MEDICINE

## 2022-10-26 PROCEDURE — 71045 X-RAY EXAM CHEST 1 VIEW: CPT

## 2022-10-26 PROCEDURE — 82948 REAGENT STRIP/BLOOD GLUCOSE: CPT

## 2022-10-26 RX ORDER — POTASSIUM CHLORIDE 20 MEQ/1
40 TABLET, EXTENDED RELEASE ORAL 2 TIMES DAILY
Status: COMPLETED | OUTPATIENT
Start: 2022-10-26 | End: 2022-10-26

## 2022-10-26 RX ORDER — SODIUM CHLORIDE 9 MG/ML
75 INJECTION, SOLUTION INTRAVENOUS CONTINUOUS
Status: DISPENSED | OUTPATIENT
Start: 2022-10-27 | End: 2022-10-27

## 2022-10-26 RX ORDER — CARVEDILOL 25 MG/1
25 TABLET ORAL 2 TIMES DAILY WITH MEALS
Status: DISCONTINUED | OUTPATIENT
Start: 2022-10-26 | End: 2022-11-14

## 2022-10-26 RX ORDER — ALBUTEROL SULFATE 2.5 MG/3ML
2.5 SOLUTION RESPIRATORY (INHALATION) EVERY 6 HOURS PRN
Status: DISCONTINUED | OUTPATIENT
Start: 2022-10-26 | End: 2022-10-29

## 2022-10-26 RX ADMIN — GABAPENTIN 200 MG: 100 CAPSULE ORAL at 09:28

## 2022-10-26 RX ADMIN — MORPHINE SULFATE 4 MG: 4 INJECTION INTRAVENOUS at 09:51

## 2022-10-26 RX ADMIN — OXYCODONE HYDROCHLORIDE 5 MG: 5 TABLET ORAL at 11:37

## 2022-10-26 RX ADMIN — CARVEDILOL 12.5 MG: 12.5 TABLET, FILM COATED ORAL at 07:46

## 2022-10-26 RX ADMIN — POTASSIUM CHLORIDE 40 MEQ: 1500 TABLET, EXTENDED RELEASE ORAL at 11:38

## 2022-10-26 RX ADMIN — INSULIN GLARGINE 60 UNITS: 100 INJECTION, SOLUTION SUBCUTANEOUS at 21:38

## 2022-10-26 RX ADMIN — OXYCODONE HYDROCHLORIDE 5 MG: 5 TABLET ORAL at 00:35

## 2022-10-26 RX ADMIN — ASPIRIN 81 MG CHEWABLE TABLET 81 MG: 81 TABLET CHEWABLE at 09:28

## 2022-10-26 RX ADMIN — PIPERACILLIN AND TAZOBACTAM 3.38 G: 3; .375 INJECTION, POWDER, LYOPHILIZED, FOR SOLUTION INTRAVENOUS at 09:32

## 2022-10-26 RX ADMIN — INSULIN LISPRO 5 UNITS: 100 INJECTION, SOLUTION INTRAVENOUS; SUBCUTANEOUS at 07:46

## 2022-10-26 RX ADMIN — OXYCODONE HYDROCHLORIDE 5 MG: 5 TABLET ORAL at 21:38

## 2022-10-26 RX ADMIN — PIPERACILLIN AND TAZOBACTAM 3.38 G: 3; .375 INJECTION, POWDER, LYOPHILIZED, FOR SOLUTION INTRAVENOUS at 16:24

## 2022-10-26 RX ADMIN — HEPARIN SODIUM 5000 UNITS: 5000 INJECTION INTRAVENOUS; SUBCUTANEOUS at 14:07

## 2022-10-26 RX ADMIN — HEPARIN SODIUM 5000 UNITS: 5000 INJECTION INTRAVENOUS; SUBCUTANEOUS at 05:28

## 2022-10-26 RX ADMIN — PIPERACILLIN AND TAZOBACTAM 3.38 G: 3; .375 INJECTION, POWDER, LYOPHILIZED, FOR SOLUTION INTRAVENOUS at 21:42

## 2022-10-26 RX ADMIN — AMLODIPINE BESYLATE 10 MG: 10 TABLET ORAL at 09:58

## 2022-10-26 RX ADMIN — POTASSIUM CHLORIDE 40 MEQ: 1500 TABLET, EXTENDED RELEASE ORAL at 17:22

## 2022-10-26 RX ADMIN — ALBUTEROL SULFATE 2.5 MG: 2.5 SOLUTION RESPIRATORY (INHALATION) at 19:05

## 2022-10-26 RX ADMIN — ALPRAZOLAM 0.25 MG: 0.25 TABLET ORAL at 21:38

## 2022-10-26 RX ADMIN — CARVEDILOL 25 MG: 25 TABLET, FILM COATED ORAL at 16:22

## 2022-10-26 RX ADMIN — SERTRALINE HYDROCHLORIDE 100 MG: 100 TABLET ORAL at 09:29

## 2022-10-26 RX ADMIN — INSULIN LISPRO 5 UNITS: 100 INJECTION, SOLUTION INTRAVENOUS; SUBCUTANEOUS at 12:31

## 2022-10-26 RX ADMIN — GABAPENTIN 200 MG: 100 CAPSULE ORAL at 17:22

## 2022-10-26 RX ADMIN — PIPERACILLIN AND TAZOBACTAM 3.38 G: 3; .375 INJECTION, POWDER, LYOPHILIZED, FOR SOLUTION INTRAVENOUS at 00:35

## 2022-10-26 RX ADMIN — HEPARIN SODIUM 5000 UNITS: 5000 INJECTION INTRAVENOUS; SUBCUTANEOUS at 21:38

## 2022-10-26 RX ADMIN — INSULIN LISPRO 5 UNITS: 100 INJECTION, SOLUTION INTRAVENOUS; SUBCUTANEOUS at 16:22

## 2022-10-26 RX ADMIN — PIPERACILLIN AND TAZOBACTAM 3.38 G: 3; .375 INJECTION, POWDER, LYOPHILIZED, FOR SOLUTION INTRAVENOUS at 05:28

## 2022-10-26 NOTE — ASSESSMENT & PLAN NOTE
· Secondary to vomiting, improved with replacement   · Continue replacement and monitoring     Results from last 7 days   Lab Units 10/26/22  1125 10/25/22  0524 10/24/22  0437 10/23/22  0535 10/22/22  1420 10/22/22  0502 10/21/22  2108   POTASSIUM mmol/L 3 8 3 2* 2 8* 2 9* 2 8* 2 4* 2 4*

## 2022-10-26 NOTE — PLAN OF CARE
Problem: PAIN - ADULT  Goal: Verbalizes/displays adequate comfort level or baseline comfort level  Description: Interventions:  - Encourage patient to monitor pain and request assistance  - Assess pain using appropriate pain scale  - Administer analgesics based on type and severity of pain and evaluate response  - Implement non-pharmacological measures as appropriate and evaluate response  - Consider cultural and social influences on pain and pain management  - Notify physician/advanced practitioner if interventions unsuccessful or patient reports new pain  Outcome: Progressing     Problem: METABOLIC, FLUID AND ELECTROLYTES - ADULT  Goal: Electrolytes maintained within normal limits  Description: INTERVENTIONS:  - Monitor labs and assess patient for signs and symptoms of electrolyte imbalances  - Administer electrolyte replacement as ordered  - Monitor response to electrolyte replacements, including repeat lab results as appropriate  - Instruct patient on fluid and nutrition as appropriate  Outcome: Progressing  Goal: Fluid balance maintained  Description: INTERVENTIONS:  - Monitor labs   - Monitor I/O and WT  - Instruct patient on fluid and nutrition as appropriate  - Assess for signs & symptoms of volume excess or deficit  Outcome: Progressing  Goal: Glucose maintained within target range  Description: INTERVENTIONS:  - Monitor Blood Glucose as ordered  - Assess for signs and symptoms of hyperglycemia and hypoglycemia  - Administer ordered medications to maintain glucose within target range  - Assess nutritional intake and initiate nutrition service referral as needed  Outcome: Progressing     Problem: SKIN/TISSUE INTEGRITY - ADULT  Goal: Incision(s), wounds(s) or drain site(s) healing without S/S of infection  Description: INTERVENTIONS  - Assess and document dressing, incision, wound bed, drain sites and surrounding tissue  - Provide patient and family education  - Perform skin care/dressing changes every 4 hours  Problem: Potential for Falls  Goal: Patient will remain free of falls  Description: INTERVENTIONS:  - Educate patient/family on patient safety including physical limitations  - Instruct patient to call for assistance with activity   - Consult OT/PT to assist with strengthening/mobility   - Keep Call bell within reach  - Keep bed low and locked with side rails adjusted as appropriate  - Keep care items and personal belongings within reach  - Initiate and maintain comfort rounds  - Make Fall Risk Sign visible to staff  - Offer Toileting every 2 Hours, in advance of need  - Initiate/Maintain bed alarm  - Obtain necessary fall risk management equipment: walker  - Apply yellow socks and bracelet for high fall risk patients  - Consider moving patient to room near nurses station  Outcome: Progressing     Outcome: Progressing     Problem: DISCHARGE PLANNING  Goal: Discharge to home or other facility with appropriate resources  Description: INTERVENTIONS:  - Identify barriers to discharge w/patient and caregiver  - Arrange for needed discharge resources and transportation as appropriate  - Identify discharge learning needs (meds, wound care, etc )  - Arrange for interpretive services to assist at discharge as needed  - Refer to Case Management Department for coordinating discharge planning if the patient needs post-hospital services based on physician/advanced practitioner order or complex needs related to functional status, cognitive ability, or social support system  Outcome: Progressing

## 2022-10-26 NOTE — ASSESSMENT & PLAN NOTE
Arterial Duplex reveals "Great toe pressure of 35 mm Hg, below healing threshold for a diabetic "  Vascular surgery input appreciated, plan for CT of abdomen and pelvis to further determine intervention  Proceed with aspirin, statin therapy

## 2022-10-26 NOTE — PROGRESS NOTES
NEPHROLOGY PROGRESS NOTE   Adri Man 65 y o  female MRN: [id number]  Unit/Bed#: E2 -01 Encounter: [id number]  Reason for Consult: NYDIA    50-year-old female with history of hypertension, diabetes, depression, who presents with nonhealing left foot wound  Nephrology is consulted for acute kidney injury with potential need for IV contrast for vascular imaging  ASSESSMENT/PLAN:  Acute kidney injury:  Suspect multifactorial etiology in the setting of sepsis, urinary tract infection, Ace inhibition, and possible contrast associated nephropathy +/-progression to ATN  -baseline creatinine appears to be around 0 7-1 1 since 2020    -creatinine was 1 1 upon admission   -creatinine with acute rise to 1 44     -a m   BMP pending, patient is a difficult stick  -status post IV contrast on 10/21/2022   -continue to hold ACE-inhibitor   -bladder scan insignificant   -UA:  Glucose, large blood, greater than 300 protein, innumerable rbc's/wbc's/bacteria   -most recent renal imaging with 1 or more simple renal cysts, otherwise unremarkable   -continue to avoid nephrotoxins, hypotension, IV contrast   -strict I/O      Hypertension:  Presented with hypertensive urgency  Patient admits to not always taking her medication due to cost   Blood pressure is elevated this morning, suspect pain is contributing   -home medications:  Amlodipine 10 mg daily, lisinopril 40 mg daily, metoprolol succinate 25 mg daily  -current medications:  Amlodipine 10 mg daily, carvedilol 12 5 mg 2 times per day + p r n  hydralazine   -will increase carvedilol to 25 mg 2 times per day   -would hold lisinopril for now  -recommend holding parameters antihypertensive for systolic blood pressure less than 130       Hypokalemia:  Suspect due to vomiting   -Mag level 2 0   -will continue to monitor and replace as needed      Urinary tract infection:  Cultures positive for E coli    -currently on antibiotics      Nonhealing left foot wound/PAD:  -podiatry and vascular  team following    -currently on antibiotics  -will need MRI   -vascular recommending CTA with runoff   -would hold off on imaging requiring contrast in till creatinine is at baseline and would recommend pre and post hydration of further IV contrast is needed  Would check BMP 48 hours after contrast administration  Please minimize contrast load      Anemia:  -iron panel showed iron sat 16%, TIBC 299, iron 49  Would hold off on Venofer due to infection   -continue to monitor and transfuse as needed for hemoglobin less than 7 0      Other:  Diabetes, depression  Disposition:  Requiring additional stay due to medical needs  SUBJECTIVE:  The patient is sitting up in her bed  She reports she felt better yesterday  She denies chest discomfort or shortness of breath  She complains of lower extremity pain  She denies nausea, vomiting, diarrhea, or issues with urination  She reports eating and drinking well        OBJECTIVE:  Current Weight:    Vitals:    10/25/22 1539 10/25/22 2248 10/26/22 0743 10/26/22 0958   BP: 152/57 156/85 170/85 (!) 193/81   BP Location: Right arm Left arm Left arm    Pulse: 86  86    Resp: 18 19 19    Temp: (!) 97 °F (36 1 °C) 97 7 °F (36 5 °C) 97 8 °F (36 6 °C)    TempSrc: Temporal Temporal Temporal    SpO2: 93%  (!) 88%        Intake/Output Summary (Last 24 hours) at 10/26/2022 1016  Last data filed at 10/25/2022 2300  Gross per 24 hour   Intake --   Output 610 ml   Net -610 ml     General: NAD  Skin: warm, dry, intact, no rash  HEENT: Moist mucous membranes, sclera anicteric, normocephalic, atraumatic  Neck: No apparent JVD appreciated  Chest: lung sounds clear B/L, on RA   CVS:Regular rate and rhythm, no murmer   Abdomen: Soft, round, non-tender, +BS, obese  Extremities: B/L LE edema present, left foot wrapped  Neuro: alert and oriented  Psych: appropriate mood and affect     Medications:    Current Facility-Administered Medications:   • acetaminophen (TYLENOL) tablet 650 mg, 650 mg, Oral, Q6H PRN, Sugey Wiseman PA-C, 650 mg at 10/23/22 1751  •  ALPRAZolam (XANAX) tablet 0 25 mg, 0 25 mg, Oral, Daily PRN, Sugey Wiseman PA-C, 0 25 mg at 10/25/22 1813  •  amLODIPine (NORVASC) tablet 10 mg, 10 mg, Oral, Daily, Imelda Lees, CRNP, 10 mg at 10/26/22 4580  •  aspirin chewable tablet 81 mg, 81 mg, Oral, Daily, Sugey Wiseman PA-C, 81 mg at 10/26/22 5194  •  carvedilol (COREG) tablet 12 5 mg, 12 5 mg, Oral, BID With Meals, Imelda Lees, CRNP, 12 5 mg at 10/26/22 0746  •  gabapentin (NEURONTIN) capsule 200 mg, 200 mg, Oral, BID, Sugey Wiseman PA-C, 200 mg at 10/26/22 1107  •  heparin (porcine) subcutaneous injection 5,000 Units, 5,000 Units, Subcutaneous, Q8H [address], 5,000 Units at 10/26/22 0528 **AND** [CANCELED] Platelet count, , , Once, Sugey Wiseman PA-C  •  hydrALAZINE (APRESOLINE) injection 10 mg, 10 mg, Intravenous, Q6H PRN, Sugey Wiseman PA-C, 10 mg at 10/22/22 1700  •  insulin glargine (LANTUS) subcutaneous injection 60 Units 0 6 mL, 60 Units, Subcutaneous, HS, Sugey Wiseman PA-C, 60 Units at 10/25/22 2257  •  insulin lispro (HumaLOG) 100 units/mL subcutaneous injection 1-6 Units, 1-6 Units, Subcutaneous, 4x Daily (AC & HS), 1 Units at 10/24/22 2249 **AND** Fingerstick Glucose (POCT), , , 4x Daily AC and at bedtime, Sugey Wiseman PA-C  •  insulin lispro (HumaLOG) 100 units/mL subcutaneous injection 5 Units, 5 Units, Subcutaneous, TID With Meals, Amy Munguia MD, 5 Units at 10/26/22 0746  •  metoclopramide (REGLAN) injection 5 mg, 5 mg, Intravenous, Q6H PRN, Trey Putnam DO, 5 mg at 10/23/22 5660  •  morphine injection 4 mg, 4 mg, Intravenous, Q4H PRN, Trey Putnam DO, 4 mg at 10/26/22 0951  •  ondansetron (ZOFRAN) injection 4 mg, 4 mg, Intravenous, Q6H PRN, Sugey Wiseman PA-C, 4 mg at 10/22/22 2241  •  oxyCODONE (ROXICODONE) IR tablet 5 mg, 5 mg, Oral, Q4H PRN, Patel Duncan DO, 5 mg at 10/26/22 0035  •  piperacillin-tazobactam (ZOSYN) 3 375 g in sodium chloride 0 9 % 100 mL IVPB, 3 375 g, Intravenous, Q6H, Sugey Wiseman PA-C, Last Rate: 200 mL/hr at 10/26/22 0932, 3 375 g at 10/26/22 0932  •  [COMPLETED] potassium chloride 20 mEq IVPB (premix), 20 mEq, Intravenous, Once, Stopped at 10/22/22 1014 **FOLLOWED BY** potassium chloride 20 mEq IVPB (premix), 20 mEq, Intravenous, Once, Hector Shields PA-C, Held at 10/22/22 6509  •  sertraline (ZOLOFT) tablet 100 mg, 100 mg, Oral, Daily, Sugey Wiseman PA-C, 100 mg at 10/26/22 2430    Laboratory Results:  Results from last 7 days   Lab Units 10/26/22  0617 10/25/22  0524 10/24/22  0437 10/23/22  0706 10/23/22  0535 10/22/22  1420 10/22/22  0502 10/21/22  2108   WBC Thousand/uL 14 19* 12 49* 12 61*   < >  --   --    < > 13 39*   HEMOGLOBIN g/dL 10 5* 9 0* 9 0*   < >  --   --    < > 10 1*   HEMATOCRIT % 32 9* 28 8* 28 9*   < >  --   --    < > 31 1*   PLATELETS Thousands/uL 370 329 332   < >  --   --    < > 351   SODIUM mmol/L  --  139 141  --  141 140   < > 143   POTASSIUM mmol/L  --  3 2* 2 8*  --  2 9* 2 8*   < > 2 4*   CHLORIDE mmol/L  --  105 105  --  104 102   < > 102   CO2 mmol/L  --  25 28  --  27 30   < > 31   BUN mg/dL  --  19 19  --  15 14   < > 16   CREATININE mg/dL  --  1 43* 1 44*  --  1 27 1 26   < > 1 10   CALCIUM mg/dL  --  8 9 8 7  --  8 0* 8 7   < > 8 6   MAGNESIUM mg/dL  --   --  2 0  --  1 9 2 1   < > 1 6   ALK PHOS U/L  --   --  100  --   --   --   --  104   ALT U/L  --   --  26  --   --   --   --  15   AST U/L  --   --  39  --   --   --   --  15    < > = values in this interval not displayed

## 2022-10-26 NOTE — ASSESSMENT & PLAN NOTE
Present on admission with Leucocytosis and tachycardia  Due to left foot wound  Resolved  Continued on IV Zosyn  Blood cultures no growth 4 days

## 2022-10-26 NOTE — UTILIZATION REVIEW
Continued Stay Review    Date:    10/26/22                          Current Patient Class:    Inpatient  Current Level of Care:    Med surg    HPI:62 y o  female initially admitted on    10/22 with   Non healing left heel wound     Nephrology consult  ( 10/25)     Baseline  Creatinine  0 7 - 1 1  Since  2020  Creatinine  1 1 on admission, now   1 44  Hold  ACE  Had  IV  Dye   10/21  Continue to monitor  Need strict  I & O        Vascular consult  ( 10/25)     Bilateral tissue loss in the setting of uncontrolled type II DM and NYDIA on admission  -Diminished femoral pulses bilaterally on exam  -Extensive L heel tissue loss with R hallux and 5th digit tissue loss  Patient will likely require bilateral intervention  Questionable salvageability of the LLE   -Given diminished femoral pulses bilaterally, recommend CTA of the abdomen with run off  Assessment/Plan:   10/26    Continue  PT/OT  Continue  DYLLAN  BC  NG  X 4 days  Continue to monitor labs  And replace electrolytes  Still complains of B/L foot pain  Waiting podiatry   Plans  No  Urgent  Indication  For  Surgical intervention  Continue local wound care  Remains  NWB  L foot,  WBAT   r foot        Vital Signs:    97 8-86-19         193/81      Pertinent Labs/Diagnostic Results:   Results from last 7 days   Lab Units 10/21/22  2108   SARS-COV-2  Negative     Results from last 7 days   Lab Units 10/26/22  0617 10/25/22  0524 10/24/22  0437 10/23/22  0706 10/22/22  0502   WBC Thousand/uL 14 19* 12 49* 12 61* 14 91* 13 57*   HEMOGLOBIN g/dL 10 5* 9 0* 9 0* 9 5* 10 1*   HEMATOCRIT % 32 9* 28 8* 28 9* 29 3* 30 7*   PLATELETS Thousands/uL 370 329 332 338 334   NEUTROS ABS Thousands/µL 10 93* 8 81*  --  12 49* 9 69*         Results from last 7 days   Lab Units 10/26/22  1125 10/25/22  0524 10/24/22  0437 10/23/22  0535 10/22/22  1420 10/22/22  0502 10/21/22  2108   SODIUM mmol/L 140 139 141 141 140 143 143   POTASSIUM mmol/L 3 8 3 2* 2 8* 2 9* 2 8* 2 4* 2 4*   CHLORIDE mmol/L 105 105 105 104 102 103 102   CO2 mmol/L 27 25 28 27 30 32 31   ANION GAP mmol/L 8 9 8 10 8 8 10   BUN mg/dL 16 19 19 15 14 13 16   CREATININE mg/dL 1 17 1 43* 1 44* 1 27 1 26 1 09 1 10   EGFR ml/min/1 73sq m 50 39 39 45 46 54 54   CALCIUM mg/dL 8 6 8 9 8 7 8 0* 8 7 8 2* 8 6   MAGNESIUM mg/dL  --   --  2 0 1 9 2 1 1 5* 1 6     Results from last 7 days   Lab Units 10/24/22  0437 10/21/22  2108   AST U/L 39 15   ALT U/L 26 15   ALK PHOS U/L 100 104   TOTAL PROTEIN g/dL 6 6 6 6   ALBUMIN g/dL 2 2* 2 3*   TOTAL BILIRUBIN mg/dL 0 24 0 19*     Results from last 7 days   Lab Units 10/26/22  1134 10/26/22  0718 10/25/22  2057 10/25/22  1639 10/25/22  1107 10/25/22  0712 10/24/22  2150 10/24/22  1544 10/24/22  1104 10/24/22  0716 10/23/22  2125 10/23/22  1521   POC GLUCOSE mg/dl 105 90 181* 135 115 147* 177* 148* 178* 165* 197* 198*     Results from last 7 days   Lab Units 10/26/22  1125 10/25/22  0524 10/24/22  0437 10/23/22  0535 10/22/22  1420 10/22/22  0502 10/21/22  2108   GLUCOSE RANDOM mg/dL 79 106 86 222* 207* 203* 176*         Results from last 7 days   Lab Units 10/25/22  0524   HEMOGLOBIN A1C % 9 8*   EAG mg/dl 235       Results from last 7 days   Lab Units 10/22/22  0105 10/21/22  2316 10/21/22  2108   HS TNI 0HR ng/L  --   --  42   HS TNI 2HR ng/L  --  42  --    HSTNI D2 ng/L  --  0  --    HS TNI 4HR ng/L 39  --   --    HSTNI D4 ng/L -3  --   --          Results from last 7 days   Lab Units 10/21/22  2108   PROTIME seconds 13 2   INR  1 00   PTT seconds 28         Results from last 7 days   Lab Units 10/21/22  2108   PROCALCITONIN ng/ml 0 06     Results from last 7 days   Lab Units 10/21/22  2108   LACTIC ACID mmol/L 0 5             Results from last 7 days   Lab Units 10/21/22  2108   NT-PRO BNP pg/mL 2,405*     Results from last 7 days   Lab Units 10/25/22  0524   FERRITIN ng/mL 60                         Results from last 7 days   Lab Units 10/21/22  2128   CLARITY UA  Turbid   COLOR UA Light Yellow   SPEC GRAV UA  >=1 030   PH UA  6 0   GLUCOSE UA mg/dl 250 (1/4%)*   KETONES UA mg/dl Negative   BLOOD UA  Large*   PROTEIN UA mg/dl >=300*   NITRITE UA  Negative   BILIRUBIN UA  Negative   UROBILINOGEN UA E U /dl 0 2   LEUKOCYTES UA  Negative   WBC UA /hpf Innumerable*   RBC UA /hpf Innumerable*   BACTERIA UA /hpf Innumerable*   EPITHELIAL CELLS WET PREP /hpf Moderate*     Results from last 7 days   Lab Units 10/21/22  2108   INFLUENZA A PCR  Negative   INFLUENZA B PCR  Negative   RSV PCR  Negative               Results from last 7 days   Lab Units 10/21/22  2128 10/21/22  2108 10/21/22  2107   BLOOD CULTURE   --  No Growth After 4 Days  No Growth After 4 Days  URINE CULTURE  >100,000 cfu/ml Escherichia coli*  --   --                  Medications:   Scheduled Medications:  amLODIPine, 10 mg, Oral, Daily  aspirin, 81 mg, Oral, Daily  carvedilol, 25 mg, Oral, BID With Meals  gabapentin, 200 mg, Oral, BID  heparin (porcine), 5,000 Units, Subcutaneous, Q8H MICHELLE  insulin glargine, 60 Units, Subcutaneous, HS  insulin lispro, 1-6 Units, Subcutaneous, 4x Daily (AC & HS)  insulin lispro, 5 Units, Subcutaneous, TID With Meals  piperacillin-tazobactam, 3 375 g, Intravenous, Q6H  potassium chloride, 40 mEq, Oral, BID  potassium chloride, 20 mEq, Intravenous, Once  sertraline, 100 mg, Oral, Daily      Continuous IV Infusions:     PRN Meds:  acetaminophen, 650 mg, Oral, Q6H PRN  ALPRAZolam, 0 25 mg, Oral, Daily PRN  hydrALAZINE, 10 mg, Intravenous, Q6H PRN  metoclopramide, 5 mg, Intravenous, Q6H PRN  morphine injection, 4 mg, Intravenous, Q4H PRN  ondansetron, 4 mg, Intravenous, Q6H PRN  oxyCODONE, 5 mg, Oral, Q4H PRN        Discharge Plan:    D    Network Utilization Review Department  ATTENTION: Please call with any questions or concerns to [phone] and carefully listen to the prompts so that you are directed to the right person   All voicemails are confidential   [place] Doing all requests for admission clinical reviews, approved or denied determinations and any other requests to dedicated fax number below belonging to the campus where the patient is receiving treatment   List of dedicated fax numbers for the Facilities:  [address] [place] [address] DENIALS (Administrative/Medical Necessity) [phone]   [address] [place] [address] (Maternity/NICU/Pediatrics) [phone] [address] [phone]   [facility] 77 [phone]   [address] [phone]   [address] [facility] 134 815 [facility] [phone]

## 2022-10-26 NOTE — ASSESSMENT & PLAN NOTE
Lab Results   Component Value Date    HGBA1C 9 8 (H) 10/25/2022     Recent Labs     10/25/22  1639 10/25/22  2057 10/26/22  0718 10/26/22  1134   POCGLU 135 181* 90 105     · Uncontrolled based on prior A1C, will update  · Apparently has been rationing glargine due to cost  · Glargine restarted at 60 units  Added pre-meal Humalog 5 units TID  Blood glucose in acceptable range  Continue sliding scale

## 2022-10-26 NOTE — PROGRESS NOTES
[facility]  Progress Note - Annalise Offer 1960, 65 y o  female MRN: [id number]  Unit/Bed#: E2 -01 Encounter: [id number]  Primary Care Provider: ALEX Alberto   Date and time admitted to hospital: 10/21/2022  7:58 PM    * Non healing left heel wound  Assessment & Plan  · In setting of uncontrolled diabetes   · Arterial duplex reviewed, "Great toe pressure of 35 mm Hg, below healing threshold for a diabetic"  · Will await further recommendations from Podiatry   · Continue Zosyn    Sepsis [facility])  Assessment & Plan  Present on admission with Leucocytosis and tachycardia  Due to left foot wound  Resolved  Continued on IV Zosyn  Blood cultures no growth 4 days    NYDIA (acute kidney injury) ([facility] [address] )  Assessment & Plan  Resolved      PAD (peripheral artery disease) ([facility] )  Assessment & Plan  Arterial Duplex reveals "Great toe pressure of 35 mm Hg, below healing threshold for a diabetic "  Vascular surgery input appreciated, plan for CT of abdomen and pelvis to further determine intervention  Proceed with aspirin, statin therapy         UTI (urinary tract infection)  Assessment & Plan  · E Coli UTI, final cultures and sensitivities pending  Reports improvement in symptoms  Remains on Zosyn for foot infection    Hypokalemia  Assessment & Plan  · Secondary to vomiting, improved with replacement   · Continue replacement and monitoring     Results from last 7 days   Lab Units 10/26/22  1125 10/25/22  0524 10/24/22  0437 10/23/22  0535 10/22/22  1420 10/22/22  0502 10/21/22  2108   POTASSIUM mmol/L 3 8 3 2* 2 8* 2 9* 2 8* 2 4* 2 4*       Hypertensive urgency  Assessment & Plan  · Felt to be due to patient underdosing her meds because of cost   · Toprol changed to carvedilol with good response    · Continue lisinopril 40 mg and amlodipine 10 mg daily    Type 2 diabetes mellitus with hyperglycemia, with long-term current use of insulin [facility])  Assessment & Plan  Lab Results   Component Value Date    HGBA1C 9 8 (H) 10/25/2022     Recent Labs     10/25/22  1639 10/25/22  2057 10/26/22  0718 10/26/22  1134   POCGLU 135 181* 90 105     · Uncontrolled based on prior A1C, will update  · Apparently has been rationing glargine due to cost  · Glargine restarted at 60 units  Added pre-meal Humalog 5 units TID  Blood glucose in acceptable range  Continue sliding scale  Depression, recurrent ([facility] [address] )  Assessment & Plan  · Controlled  · Continue sertraline, Xanax PRN        VTE Pharmacologic Prophylaxis: VTE Score: 4 Moderate Risk (Score 3-4) - Pharmacological DVT Prophylaxis Ordered: heparin  Patient Centered Rounds: I performed bedside rounds with nursing staff today  Discussions with Specialists or Other Care Team Provider:  Multidisciplinary meeting    Education and Discussions with Family / Patient: Patient  Time Spent for Care: 45 minutes  More than 50% of total time spent on counseling and coordination of care as described above  Current Length of Stay: 4 day(s)  Current Patient Status: Inpatient   Certification Statement: The patient will continue to require additional inpatient hospital stay due to Vascular workup, potential surgery  Discharge Plan: Anticipate discharge in >72 hrs to rehab facility  versus home with VNA    Code Status: Level 1 - Full Code    Subjective:   Patient seen and examined  She is complaining of bilateral foot pain for which she recently took a medication  Otherwise no complaints  Objective:     Vitals:   Temp (24hrs), Av 5 °F (36 4 °C), Min:97 °F (36 1 °C), Max:97 8 °F (36 6 °C)    Temp:  [97 °F (36 1 °C)-97 8 °F (36 6 °C)] 97 8 °F (36 6 °C)  HR:  [86] 86  Resp:  [18-19] 19  BP: (152-193)/(57-85) 193/81  SpO2:  [88 %-93 %] 88 %  There is no height or weight on file to calculate BMI  Input and Output Summary (last 24 hours):      Intake/Output Summary (Last 24 hours) at 10/26/2022 1204  Last data filed at 10/25/2022 2300  Gross per 24 hour   Intake -- Output 610 ml   Net -610 ml       Physical Exam:   Physical Exam  Constitutional:       General: She is not in acute distress  HENT:      Head: Normocephalic and atraumatic  Mouth/Throat:      Pharynx: Oropharynx is clear  Eyes:      Conjunctiva/sclera: Conjunctivae normal    Cardiovascular:      Rate and Rhythm: Normal rate and regular rhythm  Heart sounds: No murmur heard  Pulmonary:      Effort: No respiratory distress  Breath sounds: No wheezing or rales  Abdominal:      General: There is no distension  Tenderness: There is no abdominal tenderness  There is no guarding  Musculoskeletal:      Comments: L foot dressing intact    Skin:     General: Skin is warm and dry  Neurological:      Mental Status: She is oriented to person, place, and time  Psychiatric:         Mood and Affect: Mood normal           Additional Data:     Labs:  Results from last 7 days   Lab Units 10/26/22  0617   WBC Thousand/uL 14 19*   HEMOGLOBIN g/dL 10 5*   HEMATOCRIT % 32 9*   PLATELETS Thousands/uL 370   NEUTROS PCT % 77*   LYMPHS PCT % 12*   MONOS PCT % 6   EOS PCT % 3     Results from last 7 days   Lab Units 10/26/22  1125 10/25/22  0524 10/24/22  0437   SODIUM mmol/L 140   < > 141   POTASSIUM mmol/L 3 8   < > 2 8*   CHLORIDE mmol/L 105   < > 105   CO2 mmol/L 27   < > 28   BUN mg/dL 16   < > 19   CREATININE mg/dL 1 17   < > 1 44*   ANION GAP mmol/L 8   < > 8   CALCIUM mg/dL 8 6   < > 8 7   ALBUMIN g/dL  --   --  2 2*   TOTAL BILIRUBIN mg/dL  --   --  0 24   ALK PHOS U/L  --   --  100   ALT U/L  --   --  26   AST U/L  --   --  39   GLUCOSE RANDOM mg/dL 79   < > 86    < > = values in this interval not displayed       Results from last 7 days   Lab Units 10/21/22  2108   INR  1 00     Results from last 7 days   Lab Units 10/26/22  1134 10/26/22  0718 10/25/22  2057 10/25/22  1639 10/25/22  1107 10/25/22  6708 10/24/22  2150 10/24/22  1544 10/24/22  1104 10/24/22  0716 10/23/22  2125 10/23/22  1521   POC GLUCOSE mg/dl 105 90 181* 135 115 147* 177* 148* 178* 165* 197* 198*     Results from last 7 days   Lab Units 10/25/22  0524   HEMOGLOBIN A1C % 9 8*     Results from last 7 days   Lab Units 10/21/22  2108   LACTIC ACID mmol/L 0 5   PROCALCITONIN ng/ml 0 06       Lines/Drains:  Invasive Devices  Report    Peripheral Intravenous Line  Duration           Peripheral IV 10/24/22 Left;Ventral (anterior) Forearm 2 days                  Imaging: Will review CT abdomen pelvis    Recent Cultures (last 7 days):   Results from last 7 days   Lab Units 10/21/22  2128 10/21/22  2108 10/21/22  2107   BLOOD CULTURE   --  No Growth After 4 Days  No Growth After 4 Days     URINE CULTURE  >100,000 cfu/ml Escherichia coli*  --   --        Last 24 Hours Medication List:   Current Facility-Administered Medications   Medication Dose Route Frequency Provider Last Rate   • acetaminophen  650 mg Oral Q6H PRN Sugey Wiseman PA-C     • ALPRAZolam  0 25 mg Oral Daily PRN Sugey Wiseman PA-C     • amLODIPine  10 mg Oral Daily AnishALEX Sherman     • aspirin  81 mg Oral Daily Sugey Wiseman PA-C     • carvedilol  25 mg Oral BID With Meals ALEX Amanda     • gabapentin  200 mg Oral BID Sugey Wiseman PA-C     • heparin (porcine)  5,000 Units Subcutaneous [facility] Sugey Wiseman PA-C     • hydrALAZINE  10 mg Intravenous Q6H PRN Sugey Wiseman PA-C     • insulin glargine  60 Units Subcutaneous HS Sugey Wiseman PA-C     • insulin lispro  1-6 Units Subcutaneous 4x Daily (AC & HS) Sugey Wiseman PA-C     • insulin lispro  5 Units Subcutaneous TID With Meals Amy Gutierrez MD     • metoclopramide  5 mg Intravenous Q6H PRN Cosme Gomez, DO     • morphine injection  4 mg Intravenous Q4H PRN Cosme Gomez, DO     • ondansetron  4 mg Intravenous Q6H PRN Sugey Wiseman PA-C     • oxyCODONE  5 mg Oral Q4H PRN Cosme Gomez, DO     • piperacillin-tazobactam  3 375 g Intravenous Q6H Sugey Wiseman PA-C 3 375 g (10/26/22 1760)   • potassium chloride  40 mEq Oral BID Kaye Pearce, DO     • potassium chloride  20 mEq Intravenous Once April Blount PA-C Stopped (10/22/22 3776)   • sertraline  100 mg Oral Daily Sugey Mcnulty PA-C          Today, Patient Was Seen By: Francisco Whitmore    **Please Note: This note may have been constructed using a voice recognition system  **

## 2022-10-26 NOTE — CASE MANAGEMENT
Case Management Discharge Planning Note    Patient name Maribeth Stauffer  Location [address] [place] [address] /[address] 80-* MRN [id number]  : 1960 Date 10/26/2022       Current Admission Date: 10/21/2022  Current Admission Diagnosis:Non healing left heel wound   Patient Active Problem List    Diagnosis Date Noted   • PAD (peripheral artery disease) ([facility] [address] ) 10/25/2022   • NYDIA (acute kidney injury) ([address] ) 10/25/2022   • UTI (urinary tract infection) 10/23/2022   • N&V (nausea and vomiting) 10/22/2022   • Sepsis ([address] ) 10/22/2022   • Non healing left heel wound 10/22/2022   • Abnormal EKG 10/22/2022   • Hypertensive urgency 10/22/2022   • Hypokalemia 10/22/2022   • Wound of lower extremity 10/21/2022   • Epigastric pain 2022   • Type 2 diabetes mellitus with hyperglycemia, with long-term current use of insulin ([facility] [address] ) 10/18/2021   • Hyperparathyroidism ([facility] [address] ) 10/18/2021   • 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 ([facility] [address] ) 2021   • 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   • Moderate protein-calorie malnutrition ([facility] [address] ) 2020   • Acute colitis 2020   • Arthritis 2019   • Depression, recurrent ([facility] [address] ) 2018   • Class 3 severe obesity due to excess calories with serious comorbidity and body mass index (BMI) of 40 0 to 44 9 in adult ([facility] [address] ) 2018   • Esophageal reflux 2018   • Uncontrolled type 2 diabetes with neuropathy 2018   • Diabetic peripheral neuropathy ([facility] [address] )    • Systolic murmur 90/15/0461   • Cerebral infarction ([facility] [address] ) 2015   • Anxiety 2015   • Vitamin D deficiency 2015   • Benign essential hypertension 2012   • Familial combined hyperlipidemia 2012      LOS (days): 4  Geometric Mean LOS (GMLOS) (days):   Days to GMLOS:     OBJECTIVE:  Risk of Unplanned Readmission Score: 16 46         Current admission status: Inpatient   Preferred Pharmacy:   [facility]pharm[facility] #[id number] [facility] - [address]  Phone: [phone] Fax: [phone]    Primary Care Provider: ALEX Gamble    Primary Insurance: BLUE CROSS  Secondary Insurance:     DISCHARGE DETAILS:    Discharge planning discussed with[de-identified] Patient  Freedom of Choice: Yes  Comments - Freedom of Choice: Pt in agreement with STR recommendation, requested no referral to [facility] as multiple family members have been there in the past and did not have a positive experience  Other Referral/Resources/Interventions Provided:  Interventions: Short Term Rehab  Referral Comments: STR referrals placed via [employer] [place] per pt's request  CM department to follow for determinations      Treatment Team Recommendation: Short Term Rehab  Discharge Destination Plan[de-identified] Short Term Rehab

## 2022-10-27 ENCOUNTER — APPOINTMENT (INPATIENT)
Dept: RADIOLOGY | Facility: HOSPITAL | Age: 62
DRG: 853 | End: 2022-10-27
Attending: FAMILY MEDICINE
Payer: COMMERCIAL

## 2022-10-27 ENCOUNTER — APPOINTMENT (INPATIENT)
Dept: MRI IMAGING | Facility: HOSPITAL | Age: 62
DRG: 853 | End: 2022-10-27
Payer: COMMERCIAL

## 2022-10-27 ENCOUNTER — APPOINTMENT (INPATIENT)
Dept: CT IMAGING | Facility: HOSPITAL | Age: 62
DRG: 853 | End: 2022-10-27
Payer: COMMERCIAL

## 2022-10-27 PROBLEM — E87.70 GENERALIZED EDEMA DUE TO FLUID OVERLOAD: Status: ACTIVE | Noted: 2022-10-27

## 2022-10-27 PROBLEM — R60.1 GENERALIZED EDEMA DUE TO FLUID OVERLOAD: Status: ACTIVE | Noted: 2022-10-27

## 2022-10-27 LAB
ANION GAP SERPL CALCULATED.3IONS-SCNC: 10 MMOL/L (ref 4–13)
BUN SERPL-MCNC: 14 MG/DL (ref 5–25)
CALCIUM SERPL-MCNC: 8.7 MG/DL (ref 8.3–10.1)
CHLORIDE SERPL-SCNC: 107 MMOL/L (ref 96–108)
CHLORIDE UR-SCNC: 108 MMOL/L
CO2 SERPL-SCNC: 25 MMOL/L (ref 21–32)
CREAT SERPL-MCNC: 1.13 MG/DL (ref 0.6–1.3)
CREAT UR-MCNC: 58.8 MG/DL
GFR SERPL CREATININE-BSD FRML MDRD: 52 ML/MIN/1.73SQ M
GLUCOSE SERPL-MCNC: 137 MG/DL (ref 65–140)
GLUCOSE SERPL-MCNC: 155 MG/DL (ref 65–140)
GLUCOSE SERPL-MCNC: 70 MG/DL (ref 65–140)
GLUCOSE SERPL-MCNC: 80 MG/DL (ref 65–140)
GLUCOSE SERPL-MCNC: 99 MG/DL (ref 65–140)
POTASSIUM SERPL-SCNC: 3.1 MMOL/L (ref 3.5–5.3)
SODIUM 24H UR-SCNC: 67 MOL/L
SODIUM SERPL-SCNC: 142 MMOL/L (ref 135–147)

## 2022-10-27 PROCEDURE — 94640 AIRWAY INHALATION TREATMENT: CPT

## 2022-10-27 PROCEDURE — 05HY33Z INSERTION OF INFUSION DEVICE INTO UPPER VEIN, PERCUTANEOUS APPROACH: ICD-10-PCS | Performed by: FAMILY MEDICINE

## 2022-10-27 PROCEDURE — 80048 BASIC METABOLIC PNL TOTAL CA: CPT | Performed by: FAMILY MEDICINE

## 2022-10-27 PROCEDURE — 82948 REAGENT STRIP/BLOOD GLUCOSE: CPT

## 2022-10-27 PROCEDURE — 84540 ASSAY OF URINE/UREA-N: CPT | Performed by: INTERNAL MEDICINE

## 2022-10-27 PROCEDURE — 97110 THERAPEUTIC EXERCISES: CPT

## 2022-10-27 PROCEDURE — 73721 MRI JNT OF LWR EXTRE W/O DYE: CPT

## 2022-10-27 PROCEDURE — 82570 ASSAY OF URINE CREATININE: CPT | Performed by: INTERNAL MEDICINE

## 2022-10-27 PROCEDURE — 97535 SELF CARE MNGMENT TRAINING: CPT

## 2022-10-27 PROCEDURE — 75635 CT ANGIO ABDOMINAL ARTERIES: CPT

## 2022-10-27 PROCEDURE — 82436 ASSAY OF URINE CHLORIDE: CPT | Performed by: INTERNAL MEDICINE

## 2022-10-27 PROCEDURE — 99232 SBSQ HOSP IP/OBS MODERATE 35: CPT | Performed by: INTERNAL MEDICINE

## 2022-10-27 PROCEDURE — 97530 THERAPEUTIC ACTIVITIES: CPT

## 2022-10-27 PROCEDURE — 84300 ASSAY OF URINE SODIUM: CPT | Performed by: INTERNAL MEDICINE

## 2022-10-27 PROCEDURE — 99233 SBSQ HOSP IP/OBS HIGH 50: CPT | Performed by: FAMILY MEDICINE

## 2022-10-27 RX ORDER — ATORVASTATIN CALCIUM 40 MG/1
40 TABLET, FILM COATED ORAL
Status: DISCONTINUED | OUTPATIENT
Start: 2022-10-27 | End: 2023-01-12 | Stop reason: HOSPADM

## 2022-10-27 RX ORDER — POTASSIUM CHLORIDE 20 MEQ/1
40 TABLET, EXTENDED RELEASE ORAL 2 TIMES DAILY
Status: COMPLETED | OUTPATIENT
Start: 2022-10-27 | End: 2022-10-27

## 2022-10-27 RX ORDER — FUROSEMIDE 10 MG/ML
20 INJECTION INTRAMUSCULAR; INTRAVENOUS ONCE
Status: COMPLETED | OUTPATIENT
Start: 2022-10-27 | End: 2022-10-27

## 2022-10-27 RX ADMIN — INSULIN LISPRO 1 UNITS: 100 INJECTION, SOLUTION INTRAVENOUS; SUBCUTANEOUS at 11:38

## 2022-10-27 RX ADMIN — MORPHINE SULFATE 4 MG: 4 INJECTION INTRAVENOUS at 12:24

## 2022-10-27 RX ADMIN — OXYCODONE HYDROCHLORIDE 5 MG: 5 TABLET ORAL at 10:17

## 2022-10-27 RX ADMIN — ALBUTEROL SULFATE 2.5 MG: 2.5 SOLUTION RESPIRATORY (INHALATION) at 22:07

## 2022-10-27 RX ADMIN — PIPERACILLIN AND TAZOBACTAM 3.38 G: 3; .375 INJECTION, POWDER, LYOPHILIZED, FOR SOLUTION INTRAVENOUS at 04:37

## 2022-10-27 RX ADMIN — INSULIN LISPRO 5 UNITS: 100 INJECTION, SOLUTION INTRAVENOUS; SUBCUTANEOUS at 11:39

## 2022-10-27 RX ADMIN — HEPARIN SODIUM 5000 UNITS: 5000 INJECTION INTRAVENOUS; SUBCUTANEOUS at 05:55

## 2022-10-27 RX ADMIN — PIPERACILLIN AND TAZOBACTAM 3.38 G: 3; .375 INJECTION, POWDER, LYOPHILIZED, FOR SOLUTION INTRAVENOUS at 10:20

## 2022-10-27 RX ADMIN — POTASSIUM CHLORIDE 40 MEQ: 1500 TABLET, EXTENDED RELEASE ORAL at 11:55

## 2022-10-27 RX ADMIN — POTASSIUM CHLORIDE 40 MEQ: 1500 TABLET, EXTENDED RELEASE ORAL at 22:33

## 2022-10-27 RX ADMIN — GABAPENTIN 200 MG: 100 CAPSULE ORAL at 10:21

## 2022-10-27 RX ADMIN — CARVEDILOL 25 MG: 25 TABLET, FILM COATED ORAL at 17:02

## 2022-10-27 RX ADMIN — HEPARIN SODIUM 5000 UNITS: 5000 INJECTION INTRAVENOUS; SUBCUTANEOUS at 21:49

## 2022-10-27 RX ADMIN — POTASSIUM CHLORIDE 40 MEQ: 1500 TABLET, EXTENDED RELEASE ORAL at 10:20

## 2022-10-27 RX ADMIN — OXYCODONE HYDROCHLORIDE 5 MG: 5 TABLET ORAL at 15:02

## 2022-10-27 RX ADMIN — ASPIRIN 81 MG CHEWABLE TABLET 81 MG: 81 TABLET CHEWABLE at 10:20

## 2022-10-27 RX ADMIN — HYDRALAZINE HYDROCHLORIDE 10 MG: 20 INJECTION, SOLUTION INTRAMUSCULAR; INTRAVENOUS at 18:57

## 2022-10-27 RX ADMIN — SERTRALINE HYDROCHLORIDE 100 MG: 100 TABLET ORAL at 10:21

## 2022-10-27 RX ADMIN — IOHEXOL 130 ML: 350 INJECTION, SOLUTION INTRAVENOUS at 19:19

## 2022-10-27 RX ADMIN — AMLODIPINE BESYLATE 10 MG: 10 TABLET ORAL at 10:21

## 2022-10-27 RX ADMIN — INSULIN GLARGINE 60 UNITS: 100 INJECTION, SOLUTION SUBCUTANEOUS at 21:48

## 2022-10-27 RX ADMIN — CARVEDILOL 25 MG: 25 TABLET, FILM COATED ORAL at 07:42

## 2022-10-27 RX ADMIN — PIPERACILLIN AND TAZOBACTAM 3.38 G: 3; .375 INJECTION, POWDER, LYOPHILIZED, FOR SOLUTION INTRAVENOUS at 22:12

## 2022-10-27 RX ADMIN — INSULIN LISPRO 5 UNITS: 100 INJECTION, SOLUTION INTRAVENOUS; SUBCUTANEOUS at 07:42

## 2022-10-27 RX ADMIN — GABAPENTIN 200 MG: 100 CAPSULE ORAL at 17:00

## 2022-10-27 RX ADMIN — ALPRAZOLAM 0.25 MG: 0.25 TABLET ORAL at 18:57

## 2022-10-27 RX ADMIN — OXYCODONE HYDROCHLORIDE 5 MG: 5 TABLET ORAL at 18:50

## 2022-10-27 RX ADMIN — INSULIN LISPRO 5 UNITS: 100 INJECTION, SOLUTION INTRAVENOUS; SUBCUTANEOUS at 17:02

## 2022-10-27 RX ADMIN — POTASSIUM CHLORIDE 40 MEQ: 1500 TABLET, EXTENDED RELEASE ORAL at 17:00

## 2022-10-27 RX ADMIN — PIPERACILLIN AND TAZOBACTAM 3.38 G: 3; .375 INJECTION, POWDER, LYOPHILIZED, FOR SOLUTION INTRAVENOUS at 16:59

## 2022-10-27 RX ADMIN — SODIUM CHLORIDE 75 ML/HR: 0.9 INJECTION, SOLUTION INTRAVENOUS at 01:15

## 2022-10-27 RX ADMIN — FUROSEMIDE 20 MG: 10 INJECTION, SOLUTION INTRAMUSCULAR; INTRAVENOUS at 22:09

## 2022-10-27 RX ADMIN — ATORVASTATIN CALCIUM 40 MG: 40 TABLET, FILM COATED ORAL at 17:02

## 2022-10-27 RX ADMIN — HEPARIN SODIUM 5000 UNITS: 5000 INJECTION INTRAVENOUS; SUBCUTANEOUS at 15:02

## 2022-10-27 RX ADMIN — MORPHINE SULFATE 4 MG: 4 INJECTION INTRAVENOUS at 21:46

## 2022-10-27 NOTE — ASSESSMENT & PLAN NOTE
· E Coli UTI, sensitivities reviewed  Reports improvement in symptoms   Remains on Zosyn for foot infection

## 2022-10-27 NOTE — PROGRESS NOTES
NEPHROLOGY PROGRESS NOTE   [address] 65 y [address]  female MRN: [id number]  Unit/Bed#: E2 -01 Encounter: [id number]  Reason for Consult:  NYDIA    71-year-old female with HTN, DM 2, depression presenting with nonhealing left foot wound  Nephrology consulted for NYDIA and need for CTA,    ASSESSMENT/PLAN:  NYDIA:  Suspect multifactorial due to septic ATN vs possible contrast associated nephropathy with autoregulatory failure in the setting of ACEi  -Admission creatinine 1 1  Today's creatinine 1 1  -Peak creatinine 1 4  -baseline creatinine 0 7-1 1 since 2020  -workup: UA with glucose, large blood, >300 protein, innumerable RBCs/WBC/bacteria  -Imaging:  CT imaging 2/20/2022 with 1 or more simple renal cysts but no hydronephrosis  -nonoliguric    -Plan:  • Renal function stable and at baseline  • Clinically, no acute indication for renal replacement therapy (dialysis)  • Monitor renal function after CTA  Awaiting CTA after PICC line insertion today due to poor peripheral access  • Currently on NS 75 ml/hr  Plan for 6 hrs pre CTA and 4hrs post CTA  • Continue to hold ACEi  • Strict I/Os, daily weights  • Avoid nephrotoxins, NSAIDs, IV contrast if possible  • Avoid hypotension  Maintain MAP >65  • Trend BMP  Check BMP in a m  • Adjust meds to appropriate GFR  • Optimize hemodynamic status to avoid delay in renal recovery  • Will d/w Dr Pearce    Hypertension/Volume status:  -presented with hypertensive urgency , improved  Carvedilol increased to 25 mg b i d  last p m   -BP elevated but improved  Possible IVF contributing  -volume status mildly hypervolemic  -Home medications:  Amlodipine 10 mg daily, lisinopril 40 mg daily, Toprol XL 25 mg daily  -Current medications:  Amlodipine 10 mg daily, carvedilol 25 mg b i d  -hydralazine p r n   -Optimize hemodynamic status to avoid delay in renal recovery  -recommend hold parameters on antihypertensive's for SBP <130 mmHg    -Avoid hypotension or fluctuations in blood pressure  Maintain MAP >65  -low sodium (2 gm) diet  -continue to trend    Hypokalemia:  -potassium 3 1  -replete with 40 mEq b i d  x2 doses  -continue to monitor    Anemia:  -Hgb 10 5  Goal >10  -avoid IV Venofer in setting of infection  -iron studies:  Low iron and iron saturation noted  -continue to trend  -Transfuse for Hgb <7 0  -management per primary team    PAD with nonhealing left foot wound:  -bilateral foot wounds, L>R  -for CTA today after PICC line  -for MRI  -continue antibiotics per primary team  -continue local wound care  -management per vascular surgery, Podiatry and primary team    UTI:  -cultures positive for E coli  -currently on antibiotics    SUBJECTIVE:  Patient weight, alert without complaints  Denies dyspnea  No IV access for CTA currently  PICC line be placed today  Currently remains on NSS @ 75 ml/hr  Creatinine stable and near baseline at 1 1  VSS    A complete review of systems was performed  Pertinent positives and negatives noted in the HPI  Otherwise the review of systems was negative  OBJECTIVE:  Current Weight: Weight - Scale: 98 4 kg (217 lb)  Vitals:    10/26/22 2233 10/27/22 0558 10/27/22 0700 10/27/22 0808   BP: 150/72  154/70    BP Location: Right arm  Right arm    Pulse: 86  72    Resp: 18  16    Temp: 98 3 °F (36 8 °C)   (!) 97 °F (36 1 °C)   TempSrc: Temporal   Temporal   SpO2: 98%  95%    Weight:  98 4 kg (217 lb)         Intake/Output Summary (Last 24 hours) at 10/27/2022 1007  Last data filed at 10/26/2022 1201  Gross per 24 hour   Intake --   Output 200 ml   Net -200 ml     General:  Awake, alert, appears comfortable and in no acute distress  Nontoxic  Skin:  No rash, warm, good skin turgor   Eyes:  PERRL, EOMI, sclerae nonicteric   no periorbital edema   ENT:  Moist mucous membranes  Neck:  Trachea midline, symmetric  No JVD  Chest:  Clear to auscultation bilaterally without wheezes, crackles or rhonchi    Decreased breath sounds bilateral bases  CVS:  Regular rate and rhythm without murmur, gallop or rub  S1 and S2 identified and normal   No S3, S4    Abdomen:  Soft, nontender, nondistended without masses  Normal bowel sounds x 4 quadrants  No bruit  Extremities:  Warm, pink, motor and sensory intact and well perfused  No cyanosis, pallor  trace BLE edema  Left foot dressing in place  Right 5th toe eschar  Neuro:  Awake, alert, oriented x3  Grossly intact  Psych:  Appropriate affect  Mentating appropriately    Normal mental status exam        Medications:    Current Facility-Administered Medications:   •  acetaminophen (TYLENOL) tablet 650 mg, 650 mg, Oral, Q6H PRN, Sugey Wiseman PA-C, 650 mg at 10/23/22 1751  •  albuterol inhalation solution 2 5 mg, 2 5 mg, Nebulization, Q6H PRN, Amy Barbosa MD, 2 5 mg at 10/26/22 1905  •  ALPRAZolam [place]) tablet 0 25 mg, 0 25 mg, Oral, Daily PRN, Sugey Wiseman PA-C, 0 25 mg at 10/26/22 2138  •  amLODIPine (NORVASC) tablet 10 mg, 10 mg, Oral, Daily, Margarette Riddles, CRNP, 10 mg at 10/26/22 0157  •  aspirin chewable tablet 81 mg, 81 mg, Oral, Daily, Sugey Wiseman PA-C, 81 mg at 10/26/22 5436  •  atorvastatin (LIPITOR) tablet 40 mg, 40 mg, Oral, Daily With Dinner, Paige Olivarez MD  •  carvedilol (COREG) tablet 25 mg, 25 mg, Oral, BID With Meals, Margarette Riddles, CRNP, 25 mg at 10/27/22 3771  •  gabapentin (NEURONTIN) capsule 200 mg, 200 mg, Oral, BID, Sugey Wiseman PA-C, 200 mg at 10/26/22 1722  •  heparin (porcine) subcutaneous injection 5,000 Units, 5,000 Units, Subcutaneous, Q8H [facility], 5,000 Units at 10/27/22 0555 **AND** [CANCELED] Platelet count, , , Once, Sugey Wiseman PA-C  •  hydrALAZINE (APRESOLINE) injection 10 mg, 10 mg, Intravenous, Q6H PRN, Sugey Wiseman PA-C, 10 mg at 10/22/22 1700  •  insulin glargine (LANTUS) subcutaneous injection 60 Units 0 6 mL, 60 Units, Subcutaneous, HS, Sugey Wiseman PA-C, 60 Units at 10/26/22 2138  •  insulin lispro (HumaLOG) 100 units/mL subcutaneous injection 1-6 Units, 1-6 Units, Subcutaneous, 4x Daily (AC & HS), 1 Units at 10/24/22 2249 **AND** Fingerstick Glucose (POCT), , , 4x Daily AC and at bedtime, Sugey Wiseman PA-C  •  insulin lispro (HumaLOG) 100 units/mL subcutaneous injection 5 Units, 5 Units, Subcutaneous, TID With Meals, Amy De Luna MD, 5 Units at 10/27/22 0742  •  metoclopramide (REGLAN) injection 5 mg, 5 mg, Intravenous, Q6H PRN, Dorsie Quiet, DO, 5 mg at 10/23/22 4956  •  morphine injection 4 mg, 4 mg, Intravenous, Q4H PRN, Dorsie Quiet, DO, 4 mg at 10/26/22 0951  •  ondansetron (ZOFRAN) injection 4 mg, 4 mg, Intravenous, Q6H PRN, Sugey Wiseman PA-C, 4 mg at 10/22/22 2241  •  oxyCODONE (ROXICODONE) IR tablet 5 mg, 5 mg, Oral, Q4H PRN, Dorsie Quiet, DO, 5 mg at 10/26/22 2138  •  piperacillin-tazobactam (ZOSYN) 3 375 g in sodium chloride 0 9 % 100 mL IVPB, 3 375 g, Intravenous, Q6H, Sugey Wiseman PA-C, Last Rate: 200 mL/hr at 10/27/22 0437, 3 375 g at 10/27/22 0437  •  potassium chloride (K-DUR,KLOR-CON) CR tablet 40 mEq, 40 mEq, Oral, BID, Uma Wagoner PA-C  •  [COMPLETED] potassium chloride 20 mEq IVPB (premix), 20 mEq, Intravenous, Once, Stopped at 10/22/22 1014 **FOLLOWED BY** potassium chloride 20 mEq IVPB (premix), 20 mEq, Intravenous, Once, Malik Dotson PA-C, Held at 10/22/22 7744  •  sertraline (ZOLOFT) tablet 100 mg, 100 mg, Oral, Daily, Sugey Wiseman PA-C, 100 mg at 10/26/22 5255  •  sodium chloride 0 9 % infusion, 75 mL/hr, Intravenous, Continuous, Radhika Zaragoza PA-C, Last Rate: 75 mL/hr at 10/27/22 0115, 75 mL/hr at 10/27/22 0115    Laboratory Results:  Results from last 7 days   Lab Units 10/27/22  0554 10/26/22  1125 10/26/22  0617 10/25/22  0524 10/24/22  0437 10/23/22  0706 10/23/22  0535 10/22/22  1420 10/22/22  0502 10/21/22  2108   WBC Thousand/uL  --   --  14 19* 12 49* 12 61* 14 91*  --   --  13 57* 13 39*   HEMOGLOBIN g/dL  --   --  10 5* 9  0* 9 0* 9 5*  --   --  10 1* 10 1*   HEMATOCRIT %  --   --  32 9* 28 8* 28 9* 29 3*  --   --  30 7* 31 1*   PLATELETS Thousands/uL  --   --  370 329 332 338  --   --  334 351   SODIUM mmol/L 142 140  --  139 141  --  141 140 143 143   POTASSIUM mmol/L 3 1* 3 8  --  3 2* 2 8*  --  2 9* 2 8* 2 4* 2 4*   CHLORIDE mmol/L 107 105  --  105 105  --  104 102 103 102   CO2 mmol/L 25 27  --  25 28  --  27 30 32 31   BUN mg/dL 14 16  --  19 19  --  15 14 13 16   CREATININE mg/dL 1 13 1 17  --  1 43* 1 44*  --  1 27 1 26 1 09 1 10   CALCIUM mg/dL 8 7 8 6  --  8 9 8 7  --  8 0* 8 7 8 2* 8 6   MAGNESIUM mg/dL  --   --   --   --  2 0  --  1 9 2 1 1 5* 1 6       I have personally reviewed the blood work as stated above and in my note  I have personally reviewed internal Medicine, co-consultants and previous nephrology notes

## 2022-10-27 NOTE — ASSESSMENT & PLAN NOTE
Resolved  Patient is getting IV fluids in preparation for a CTA study  She does show signs of fluid overload, will anticipate initiation of diuretics following CTA  Nephrology is following

## 2022-10-27 NOTE — PROGRESS NOTES
[address] [facility]  Progress Note - [facility] [facility] 1960, 65 y o  female MRN: [id number]  Unit/Bed#: E2 -01 Encounter: [id number]  Primary Care Provider: ALEX Chaudhary   Date and time admitted to hospital: 10/21/2022  7:58 PM    * Non healing left heel wound  Assessment & Plan  · In setting of uncontrolled diabetes   · Arterial duplex reviewed, "Great toe pressure of 35 mm Hg, below healing threshold for a diabetic"  · An MRI of the foot is pending as well as CTA of abdomen/pelvis   · Continue Zosyn    Sepsis ([address] )  Assessment & Plan  Present on admission with Leucocytosis and tachycardia  Due to left foot wound  Resolved  Continued on IV Zosyn  Blood cultures no growth 4 days    Generalized edema due to fluid overload  Assessment & Plan  Iatrogenic fluid overload, patient requiring IV fluids in setting of NYDIA, contrast studies  Will plan to give diuretics following contrast administration and completion of studies    NYDIA (acute kidney injury) [facility])  Assessment & Plan  Resolved  Patient is getting IV fluids in preparation for a CTA study  She does show signs of fluid overload, will anticipate initiation of diuretics following CTA  Nephrology is following      PAD (peripheral artery disease) ([facility])  Assessment & Plan  Arterial Duplex reveals "Great toe pressure of 35 mm Hg, below healing threshold for a diabetic "  Vascular surgery input appreciated, plan for CT of abdomen and pelvis to further determine intervention  Proceed with aspirin, statin therapy         UTI (urinary tract infection)  Assessment & Plan  · E Coli UTI, sensitivities reviewed  Reports improvement in symptoms   Remains on Zosyn for foot infection    Wound of lower extremity  Assessment & Plan  As above, patient will undergo MRI of the foot for further evaluation     Type 2 diabetes mellitus with hyperglycemia, with long-term current use of insulin [facility])  Assessment & Plan  Lab Results   Component Value Date HGBA1C 9 8 (H) 10/25/2022     Recent Labs     10/26/22  1134 10/26/22  1604 10/26/22  2056 10/27/22  0736   POCGLU 105 137 140 80     · Uncontrolled based on prior A1C, will update  · Apparently has been rationing glargine due to cost  · Glargine restarted at 60 units  Added pre-meal Humalog 5 units TID  Blood glucose in acceptable range  Continue sliding scale  Benign essential hypertension  Assessment & Plan  Controlled with resumption of home medications, continue current regimen    Depression, recurrent (HCC)  Assessment & Plan  · Controlled  · Continue sertraline, Xanax PRN      VTE Pharmacologic Prophylaxis: VTE Score: 4 Moderate Risk (Score 3-4) - Pharmacological DVT Prophylaxis Ordered: heparin  Patient Centered Rounds: I performed bedside rounds with nursing staff today  Discussions with Specialists or Other Care Team Provider: Nephrology     Education and Discussions with Family / Patient: Patient    Time Spent for Care: 45 minutes  More than 50% of total time spent on counseling and coordination of care as described above  Current Length of Stay: 5 day(s)  Current Patient Status: Inpatient   Certification Statement: The patient will continue to require additional inpatient hospital stay due to close monitoring, diagnostic studies, potential surgical intervention  Discharge Plan: Anticipate discharge in >72 hrs to home with home services  versus rehab facility    Code Status: Level 1 - Full Code    Subjective:   Patient seen and examined  She is complaining of foot swelling  Pain is controlled  She does not appear in distress  Objective:     Vitals:   Temp (24hrs), Av 4 °F (36 3 °C), Min:97 °F (36 1 °C), Max:98 3 °F (36 8 °C)    Temp:  [97 °F (36 1 °C)-98 3 °F (36 8 °C)] 97 °F (36 1 °C)  HR:  [72-87] 72  Resp:  [16-18] 16  BP: (150-158)/(70-72) 154/70  SpO2:  [88 %-98 %] 95 %  Body mass index is 37 54 kg/m²  Input and Output Summary (last 24 hours):      Intake/Output Summary (Last 24 hours) at 10/27/2022 1022  Last data filed at 10/26/2022 1201  Gross per 24 hour   Intake --   Output 200 ml   Net -200 ml       Physical Exam:   Physical Exam     Additional Data:     Labs:  Results from last 7 days   Lab Units 10/26/22  0617   WBC Thousand/uL 14 19*   HEMOGLOBIN g/dL 10 5*   HEMATOCRIT % 32 9*   PLATELETS Thousands/uL 370   NEUTROS PCT % 77*   LYMPHS PCT % 12*   MONOS PCT % 6   EOS PCT % 3     Results from last 7 days   Lab Units 10/27/22  0554 10/25/22  0524 10/24/22  0437   SODIUM mmol/L 142   < > 141   POTASSIUM mmol/L 3 1*   < > 2 8*   CHLORIDE mmol/L 107   < > 105   CO2 mmol/L 25   < > 28   BUN mg/dL 14   < > 19   CREATININE mg/dL 1 13   < > 1 44*   ANION GAP mmol/L 10   < > 8   CALCIUM mg/dL 8 7   < > 8 7   ALBUMIN g/dL  --   --  2 2*   TOTAL BILIRUBIN mg/dL  --   --  0 24   ALK PHOS U/L  --   --  100   ALT U/L  --   --  26   AST U/L  --   --  39   GLUCOSE RANDOM mg/dL 70   < > 86    < > = values in this interval not displayed  Results from last 7 days   Lab Units 10/21/22  2108   INR  1 00     Results from last 7 days   Lab Units 10/27/22  0736 10/26/22  2056 10/26/22  1604 10/26/22  1134 10/26/22  0718 10/25/22  2057 10/25/22  1639 10/25/22  1107 10/25/22  0712 10/24/22  2150 10/24/22  1544 10/24/22  1104   POC GLUCOSE mg/dl 80 140 137 105 90 181* 135 115 147* 177* 148* 178*     Results from last 7 days   Lab Units 10/25/22  0524   HEMOGLOBIN A1C % 9 8*     Results from last 7 days   Lab Units 10/21/22  2108   LACTIC ACID mmol/L 0 5   PROCALCITONIN ng/ml 0 06       Lines/Drains:  Invasive Devices  Report    Peripheral Intravenous Line  Duration           Peripheral IV 10/24/22 Left;Ventral (anterior) Forearm 3 days                      Imaging: Will review MRI left foot, CTA of abdomen and pelvis    Recent Cultures (last 7 days):   Results from last 7 days   Lab Units 10/21/22  2128 10/21/22  2108 10/21/22  2107   BLOOD CULTURE   --  No Growth After 5 Days   No Growth After 5 Days    URINE CULTURE  >100,000 cfu/ml Escherichia coli*  --   --        Last 24 Hours Medication List:   Current Facility-Administered Medications   Medication Dose Route Frequency Provider Last Rate   • acetaminophen  650 mg Oral Q6H PRN Sugey Wiseman PA-C     • albuterol  2 5 mg Nebulization Q6H PRN Amy Parry MD     • ALPRAZolam  0 25 mg Oral Daily PRN Sugey Wiseman PA-C     • amLODIPine  10 mg Oral Daily ALEX Patton     • aspirin  81 mg Oral Daily Sugey Wiseman PA-C     • atorvastatin  40 mg Oral Daily With Tameka Winters MD     • carvedilol  25 mg Oral BID With Meals ALEX Patton     • gabapentin  200 mg Oral BID Sugey Wiseman PA-C     • heparin (porcine)  5,000 Units Subcutaneous [facility] Sugey Wiseman PA-C     • hydrALAZINE  10 mg Intravenous Q6H PRN Sugey Wiseman PA-C     • insulin glargine  60 Units Subcutaneous HS Sugey Wiseman PA-C     • insulin lispro  1-6 Units Subcutaneous 4x Daily (AC & HS) Sugey Wiseman PA-C     • insulin lispro  5 Units Subcutaneous TID With Meals Amy Flores MD     • metoclopramide  5 mg Intravenous Q6H PRN Judd Stephanie, DO     • morphine injection  4 mg Intravenous Q4H PRN Judd Stephanie, DO     • ondansetron  4 mg Intravenous Q6H PRN Sugey Wiseman PA-C     • oxyCODONE  5 mg Oral Q4H PRN Bindue Stephanie, DO     • piperacillin-tazobactam  3 375 g Intravenous Q6H Sugey Wiseman PA-C 3 375 g (10/27/22 3347)   • potassium chloride  40 mEq Oral BID Renato Kent PA-C     • potassium chloride  20 mEq Intravenous Once Adela Prieto PA-C Stopped (10/22/22 2458)   • sertraline  100 mg Oral Daily Sugey Wiseman PA-C     • sodium chloride  75 mL/hr Intravenous Continuous Benito Harry PA-C 75 mL/hr (10/27/22 3125)        Today, Patient Was Seen By: Marisela June    **Please Note: This note may have been constructed using a voice recognition system  **

## 2022-10-27 NOTE — ASSESSMENT & PLAN NOTE
· In setting of uncontrolled diabetes   · Arterial duplex reviewed, "Great toe pressure of 35 mm Hg, below healing threshold for a diabetic"  · An MRI of the foot is pending as well as CTA of abdomen/pelvis   · Continue Zosyn

## 2022-10-27 NOTE — ASSESSMENT & PLAN NOTE
Iatrogenic fluid overload, patient requiring IV fluids in setting of NYDIA, contrast studies  Will plan to give diuretics following contrast administration and completion of studies

## 2022-10-27 NOTE — PLAN OF CARE
Problem: OCCUPATIONAL THERAPY ADULT  Goal: Performs self-care activities at highest level of function for planned discharge setting  See evaluation for individualized goals  Description: Treatment Interventions: ADL retraining, Functional transfer training, UE strengthening/ROM, Endurance training, Cognitive reorientation, Patient/family training, Equipment evaluation/education, Compensatory technique education, Energy conservation, Activityengagement          See flowsheet documentation for full assessment, interventions and recommendations  10/27/2022 1539 by Gamaliel Weathers OT  Outcome: Progressing  Note: Limitation: Decreased ADL status, Decreased UE strength, Decreased Safe judgement during ADL, Decreased endurance, Decreased high-level ADLs  Prognosis: Good  Assessment: Pt seen for skilled OT session focused on ADLs, functional transfers and mobility, UE exercises and safety education  Pt seated upright EOB upon arrival  Pt w/ MAX assist LB Dressing to Don socks, will trial LHAE next session due to impaired functional reach  Pt w/ MOD assist sit>stand from bed and mod assist x1 functional SPT to [facility] w/ cues for maintaining NWB L LE  Pt w/ MOD assist toileting hygiene and unable to maintain NWB L LE  Pt w/ MOD assist SPT to bed w/ increased time to complete and WB through toes of L LE, unable to maintain NWB L LE  Pt after transfer SPO2 on room air 84% and then cues for pursed lip breathing and reapplied 2L O2 and SPO2 96%  Pt while seated setup for grooming tasks of washing face and combing hair  Pt completed b/l UE exercises seen above to increase strength and endurance for ADLs, functional transfers and mobility w/ cues for correct positioning, w/ cues for proper breathing techniques and sPo2 on room air 95%  Pt w/ MOD assist x1 sit>stand w/ kneeler attachment to assist w/ NWB L LE   Pt w/ MOD assist x1 several sidesteps to [facility] w/ increased time to complete while maintaining NWB w/ kneeler attachment on RW  Pt w/ increased fatigue after tasks and reports "I need to get stronger"  Pt seated EOB end of session w/ all needs met  Discussed w/ pt utilizing w/c for distances and reports if still NWB after rehab she would be able to utilize in home, just wouldn't fit in bathroom  Pt continues to be limited due to NWB L LE and difficulty to maintain, impaired balance, impaired functional reach, decreased strength and endurance, fall risk, impaired activity tolerance, multiple lines, increased fatigue, increased pain  Recommend STR when medically stable  Will continue to follow to address OT POC  The patient's raw score on the AM-PAC Daily Activity inpatient short form is 17, standardized score is 37 26, less than 39 4  Patients at this level are likely to benefit from discharge to post-acute rehabilitation services  Please refer to the recommendation of the Occupational Therapist for safe discharge planning  OT Discharge Recommendation: Post acute rehabilitation services       10/27/2022 1539 by Aracelis Evans OT  Note: Limitation: Decreased ADL status, Decreased UE strength, Decreased Safe judgement during ADL, Decreased endurance, Decreased high-level ADLs  Prognosis: Good  Assessment: Pt seen for skilled OT session focused on ADLs, functional transfers and mobility, UE exercises and safety education  Pt seated upright EOB upon arrival  Pt w/ MAX assist LB Dressing to Don socks, will trial LHAE next session due to impaired functional reach  Pt w/ MOD assist sit>stand from bed and mod assist x1 functional SPT to [facility] w/ cues for maintaining NWB L LE  Pt w/ MOD assist toileting hygiene and unable to maintain NWB L LE  Pt w/ MOD assist SPT to bed w/ increased time to complete and WB through toes of L LE, unable to maintain NWB L LE  Pt after transfer SPO2 on room air 84% and then cues for pursed lip breathing and reapplied 2L O2 and SPO2 96%   Pt while seated setup for grooming tasks of washing face and combing hair  Pt completed b/l UE exercises seen above to increase strength and endurance for ADLs, functional transfers and mobility w/ cues for correct positioning, w/ cues for proper breathing techniques and sPo2 on room air 95%  Pt w/ MOD assist x1 sit>stand w/ kneeler attachment to assist w/ NWB L LE  Pt w/ MOD assist x1 several sidesteps to [facility] w/ increased time to complete while maintaining NWB w/ kneeler attachment on RW  Pt w/ increased fatigue after tasks and reports "I need to get stronger"  Pt seated EOB end of session w/ all needs met  Discussed w/ pt utilizing w/c for distances and reports if still NWB after rehab she would be able to utilize in home, just wouldn't fit in bathroom  Pt continues to be limited due to NWB L LE and difficulty to maintain, impaired balance, impaired functional reach, decreased strength and endurance, fall risk, impaired activity tolerance, multiple lines, increased fatigue, increased pain  Recommend STR when medically stable  Will continue to follow to address OT POC  The patient's raw score on the AM-PAC Daily Activity inpatient short form is 17, standardized score is 37 26, less than 39 4  Patients at this level are likely to benefit from discharge to post-acute rehabilitation services  Please refer to the recommendation of the Occupational Therapist for safe discharge planning       OT Discharge Recommendation: Post acute rehabilitation services

## 2022-10-27 NOTE — OCCUPATIONAL THERAPY NOTE
Occupational Therapy Progress Note     Patient Name: Wesley GUTIERREZ Date: 10/27/2022  Problem List  Principal Problem:    Non healing left heel wound  Active Problems:    Depression, recurrent ([facility] )    Benign essential hypertension    Type 2 diabetes mellitus with hyperglycemia, with long-term current use of insulin ([facility])    Wound of lower extremity    N&V (nausea and vomiting)    Sepsis ([facility])    Abnormal EKG    Hypertensive urgency    Hypokalemia    UTI (urinary tract infection)    PAD (peripheral artery disease) ([facility])    NYDIA (acute kidney injury) ([facility] )    Generalized edema due to fluid overload            10/27/22 1513   OT Last Visit   OT Visit Date 10/27/22   Note Type   Note Type Treatment   Pain Assessment   Pain Assessment Tool 0-10   Pain Score 7   Pain Location/Orientation Orientation: Left; Location: Foot   Hospital Pain Intervention(s) Declines;Repositioned; Emotional support   Restrictions/Precautions   Weight Bearing Precautions Per Order Yes   RLE Weight Bearing Per Order WBAT   LLE Weight Bearing Per Order (S)  NWB   Other Precautions WBS; Fall Risk;Pain   ADL   Where Assessed Chair   Grooming Assistance 5  Supervision/Setup   Grooming Deficit Setup;Supervision/safety; Increased time to complete   Grooming Comments increased time to complete wash hair, comb hair, shampoo cap   LB Dressing Assistance 2  Maximal Assistance   LB Dressing Deficit Setup; Requires assistive device for steadying;Verbal cueing;Supervision/safety; Increased time to complete; Don/doff L sock; Don/doff R sock   LB Dressing Comments impaired functional reach and impaired balance NWB L LE   Toileting Assistance  3  Moderate Assistance   Toileting Deficit Setup;Steadying;Supervison/safety;Verbal cueing; Increased time to complete;Clothing management up;Clothing management down;Perineal hygiene; Bedside commode   Toileting Comments assist steadying in stance w/ RW support   Transfers   Sit to Stand 3  Moderate assistance Additional items Assist x 1; Increased time required;Verbal cues; Bedrails  (kneeler attachment)   Stand to Sit 3  Moderate assistance   Additional items Assist x 1; Increased time required;Verbal cues;Armrests  (kneeler attachment)   Stand pivot 3  Moderate assistance   Additional items Assist x 1; Increased time required;Verbal cues;Armrests  (cues for maintaining NWB on L LE)   Toilet transfer 3  Moderate assistance   Additional items Assist x 1; Increased time required;Verbal cues; Commode  (bariatric BSC)   Additional Comments cues for hand placement and positioning; non compliant w/ NWB L LE; improvement w/ kneeler attachment   Therapeutic Excerise-Strength   UE Strength Yes   Right Upper Extremity- Strength   R Shoulder Flexion; Extension;Horizontal ABduction  (triceps)   R Elbow Elbow flexion;Elbow extension  (pronation/supination, punchouts)   R Weight/Reps/Sets 2 sets x 10 reps   RUE Strength Comment tolerated well   Left Upper Extremity-Strength   L Shoulder Flexion; Extension;Horizontal ABduction  (triceps)   L Elbow Elbow flexion;Elbow extension  (pronation/supination, punchouts)   L Weights/Reps/Sets 2 sets x 10 reps   LUE Strength Comment tolerated well   Subjective   Subjective "I need to get stronger"   Cognition   Overall Cognitive Status [facility]   Arousal/Participation Alert; Cooperative   Attention Within functional limits   Orientation Level Oriented X4   Memory Within functional limits   Following Commands Follows one step commands without difficulty   Comments engages in appropriate conversations, pleasant and cooperative   Additional Activities   Additional Activities   (education on maintaining NWB L LE)   Additional Activities Comments pt receptive and requires continued training   Activity Tolerance   Activity Tolerance Patient limited by pain; Patient limited by fatigue   Medical Staff Made Aware appropriate to see per RNJoby   Assessment   Assessment Pt seen for skilled OT session focused on ADLs, functional transfers and mobility, UE exercises and safety education  Pt seated upright EOB upon arrival  Pt w/ MAX assist LB Dressing to Don socks, will trial LHAE next session due to impaired functional reach  Pt w/ MOD assist sit>stand from bed and mod assist x1 functional SPT to [facility] w/ cues for maintaining NWB L LE  Pt w/ MOD assist toileting hygiene and unable to maintain NWB L LE  Pt w/ MOD assist SPT to bed w/ increased time to complete and WB through toes of L LE, unable to maintain NWB L LE  Pt after transfer SPO2 on room air 84% and then cues for pursed lip breathing and reapplied 2L O2 and SPO2 96%  Pt while seated setup for grooming tasks of washing face and combing hair  Pt completed b/l UE exercises seen above to increase strength and endurance for ADLs, functional transfers and mobility w/ cues for correct positioning, w/ cues for proper breathing techniques and sPo2 on room air 95%  Pt w/ MOD assist x1 sit>stand w/ kneeler attachment to assist w/ NWB L LE  Pt w/ MOD assist x1 several sidesteps to [facility] w/ increased time to complete while maintaining NWB w/ kneeler attachment on RW  Pt w/ increased fatigue after tasks and reports "I need to get stronger"  Pt seated EOB end of session w/ all needs met  Discussed w/ pt utilizing w/c for distances and reports if still NWB after rehab she would be able to utilize in home, just wouldn't fit in bathroom  Pt continues to be limited due to NWB L LE and difficulty to maintain, impaired balance, impaired functional reach, decreased strength and endurance, fall risk, impaired activity tolerance, multiple lines, increased fatigue, increased pain  Recommend STR when medically stable  Will continue to follow to address OT POC  The patient's raw score on the AM-PAC Daily Activity inpatient short form is 17, standardized score is 37 26, less than 39 4  Patients at this level are likely to benefit from discharge to post-acute rehabilitation services   Please refer to the recommendation of the Occupational Therapist for safe discharge planning  Plan   Treatment Interventions ADL retraining;Functional transfer training;UE strengthening/ROM; Endurance training;Cognitive reorientation;Patient/family training;Equipment evaluation/education; Compensatory technique education; Energy conservation; Activityengagement   Goal Expiration Date 11/08/22   OT Treatment Day 1   OT Frequency 3-5x/wk   Recommendation   OT Discharge Recommendation Post acute rehabilitation services   AM-PAC Daily Activity Inpatient   Lower Body Dressing 2   Bathing 2   Toileting 3   Upper Body Dressing 3   Grooming 3   Eating 4   Daily Activity Raw Score 17   Daily Activity Standardized Score (Calc for Raw Score >=11) 37 26   AM-PAC Applied Cognition Inpatient   Following a Speech/Presentation 4   Understanding Ordinary Conversation 4   Taking Medications 3   Remembering Where Things Are Placed or Put Away 4   Remembering List of 4-5 Errands 4   Taking Care of Complicated Tasks 4   Applied Cognition Raw Score 23   Applied Cognition Standardized Score 53 08   Modified [place] Scale   Modified [place] Scale 4     Documentation completed by: Poornima Parker MS, OTR/L

## 2022-10-27 NOTE — ASSESSMENT & PLAN NOTE
Lab Results   Component Value Date    HGBA1C 9 8 (H) 10/25/2022     Recent Labs     10/26/22  1134 10/26/22  1604 10/26/22  2056 10/27/22  0736   POCGLU 105 137 140 80     · Uncontrolled based on prior A1C, will update  · Apparently has been rationing glargine due to cost  · Glargine restarted at 60 units  Added pre-meal Humalog 5 units TID  Blood glucose in acceptable range  Continue sliding scale

## 2022-10-27 NOTE — PROCEDURES
Midline Insertion (Bedside)    Date/Time: 10/27/2022 11:05 AM  Performed by: Racheal Baumann RN  Authorized by: Marisela June MD     Patient location:  Other (comment) (IR)  Universal protocol:     Procedure explained and questions answered to patient or proxy's satisfaction: yes      Relevant documents present and verified: yes      Site/side marked: yes      Immediately prior to procedure, a time out was called: yes      Patient identity confirmed:  Verbally with patient and arm band  Pre-procedure details:     Hand hygiene: Hand hygiene performed prior to insertion      Sterile barrier technique: All elements of maximal sterile technique followed      Skin preparation:  ChloraPrep    Skin preparation agent: Skin preparation agent completely dried prior to procedure    Indications:     Midline indications: no peripheral vascular access      Midline indications comment:  Need for CTA  Anesthesia (see MAR for exact dosages): Anesthesia method:  Local infiltration    Local anesthetic:  Lidocaine 1% w/o epi  Procedure details:     Location:  Brachial    Laterality:  Left    Catheter size:  4 Fr    Landmarks identified: yes      Ultrasound guidance: yes      Ultrasound image availability:  Not saved    Sterile ultrasound techniques: Sterile gel and sterile probe covers were used      Number of attempts:  1    Successful placement: yes      Catheter length (cm):  20    Exposed catheter length (cm):  1  Post-procedure details:     Post-procedure:  Securement device placed and dressing applied    Post-procedure complications: none      Patient tolerance of procedure:   Tolerated well, no immediate complications

## 2022-10-28 ENCOUNTER — APPOINTMENT (INPATIENT)
Dept: MRI IMAGING | Facility: HOSPITAL | Age: 62
DRG: 853 | End: 2022-10-28
Payer: COMMERCIAL

## 2022-10-28 PROBLEM — E87.6 HYPOKALEMIA: Status: RESOLVED | Noted: 2022-10-22 | Resolved: 2022-10-28

## 2022-10-28 LAB
ANION GAP SERPL CALCULATED.3IONS-SCNC: 7 MMOL/L (ref 4–13)
BASOPHILS # BLD AUTO: 0.08 THOUSANDS/ÂΜL (ref 0–0.1)
BASOPHILS NFR BLD AUTO: 1 % (ref 0–1)
BUN SERPL-MCNC: 16 MG/DL (ref 5–25)
CALCIUM SERPL-MCNC: 8.9 MG/DL (ref 8.3–10.1)
CHLORIDE SERPL-SCNC: 104 MMOL/L (ref 96–108)
CO2 SERPL-SCNC: 27 MMOL/L (ref 21–32)
CREAT SERPL-MCNC: 1.2 MG/DL (ref 0.6–1.3)
EOSINOPHIL # BLD AUTO: 0.26 THOUSAND/ÂΜL (ref 0–0.61)
EOSINOPHIL NFR BLD AUTO: 2 % (ref 0–6)
ERYTHROCYTE [DISTWIDTH] IN BLOOD BY AUTOMATED COUNT: 15.3 % (ref 11.6–15.1)
GFR SERPL CREATININE-BSD FRML MDRD: 48 ML/MIN/1.73SQ M
GLUCOSE SERPL-MCNC: 129 MG/DL (ref 65–140)
GLUCOSE SERPL-MCNC: 159 MG/DL (ref 65–140)
GLUCOSE SERPL-MCNC: 78 MG/DL (ref 65–140)
GLUCOSE SERPL-MCNC: 93 MG/DL (ref 65–140)
GLUCOSE SERPL-MCNC: 95 MG/DL (ref 65–140)
HCT VFR BLD AUTO: 28 % (ref 34.8–46.1)
HGB BLD-MCNC: 8.5 G/DL (ref 11.5–15.4)
IMM GRANULOCYTES # BLD AUTO: 0.13 THOUSAND/UL (ref 0–0.2)
IMM GRANULOCYTES NFR BLD AUTO: 1 % (ref 0–2)
LYMPHOCYTES # BLD AUTO: 1.45 THOUSANDS/ÂΜL (ref 0.6–4.47)
LYMPHOCYTES NFR BLD AUTO: 11 % (ref 14–44)
MCH RBC QN AUTO: 27.2 PG (ref 26.8–34.3)
MCHC RBC AUTO-ENTMCNC: 30.4 G/DL (ref 31.4–37.4)
MCV RBC AUTO: 90 FL (ref 82–98)
MONOCYTES # BLD AUTO: 1.02 THOUSAND/ÂΜL (ref 0.17–1.22)
MONOCYTES NFR BLD AUTO: 7 % (ref 4–12)
NEUTROPHILS # BLD AUTO: 10.91 THOUSANDS/ÂΜL (ref 1.85–7.62)
NEUTS SEG NFR BLD AUTO: 78 % (ref 43–75)
NRBC BLD AUTO-RTO: 0 /100 WBCS
PLATELET # BLD AUTO: 359 THOUSANDS/UL (ref 149–390)
PMV BLD AUTO: 9.1 FL (ref 8.9–12.7)
POTASSIUM SERPL-SCNC: 4.7 MMOL/L (ref 3.5–5.3)
RBC # BLD AUTO: 3.12 MILLION/UL (ref 3.81–5.12)
SODIUM SERPL-SCNC: 138 MMOL/L (ref 135–147)
UUN 24H UR-MCNC: 403 MG/DL
WBC # BLD AUTO: 13.85 THOUSAND/UL (ref 4.31–10.16)

## 2022-10-28 PROCEDURE — NC001 PR NO CHARGE: Performed by: RADIOLOGY

## 2022-10-28 PROCEDURE — 97530 THERAPEUTIC ACTIVITIES: CPT

## 2022-10-28 PROCEDURE — 99232 SBSQ HOSP IP/OBS MODERATE 35: CPT | Performed by: PHYSICIAN ASSISTANT

## 2022-10-28 PROCEDURE — 99255 IP/OBS CONSLTJ NEW/EST HI 80: CPT | Performed by: INTERNAL MEDICINE

## 2022-10-28 PROCEDURE — 97110 THERAPEUTIC EXERCISES: CPT

## 2022-10-28 PROCEDURE — 80048 BASIC METABOLIC PNL TOTAL CA: CPT | Performed by: PHYSICIAN ASSISTANT

## 2022-10-28 PROCEDURE — 82948 REAGENT STRIP/BLOOD GLUCOSE: CPT

## 2022-10-28 PROCEDURE — 99232 SBSQ HOSP IP/OBS MODERATE 35: CPT | Performed by: INTERNAL MEDICINE

## 2022-10-28 PROCEDURE — 99233 SBSQ HOSP IP/OBS HIGH 50: CPT | Performed by: FAMILY MEDICINE

## 2022-10-28 PROCEDURE — 85025 COMPLETE CBC W/AUTO DIFF WBC: CPT | Performed by: FAMILY MEDICINE

## 2022-10-28 RX ORDER — INSULIN GLARGINE 100 [IU]/ML
30 INJECTION, SOLUTION SUBCUTANEOUS
Status: DISCONTINUED | OUTPATIENT
Start: 2022-10-28 | End: 2022-11-02

## 2022-10-28 RX ORDER — ALPRAZOLAM 0.25 MG/1
0.25 TABLET ORAL ONCE
Status: COMPLETED | OUTPATIENT
Start: 2022-10-28 | End: 2022-10-28

## 2022-10-28 RX ORDER — POTASSIUM CHLORIDE 20 MEQ/1
40 TABLET, EXTENDED RELEASE ORAL 2 TIMES DAILY
Status: DISCONTINUED | OUTPATIENT
Start: 2022-10-28 | End: 2022-10-28

## 2022-10-28 RX ORDER — FUROSEMIDE 10 MG/ML
20 INJECTION INTRAMUSCULAR; INTRAVENOUS ONCE
Status: COMPLETED | OUTPATIENT
Start: 2022-10-28 | End: 2022-10-28

## 2022-10-28 RX ADMIN — OXYCODONE HYDROCHLORIDE 5 MG: 5 TABLET ORAL at 22:06

## 2022-10-28 RX ADMIN — MORPHINE SULFATE 4 MG: 4 INJECTION INTRAVENOUS at 05:22

## 2022-10-28 RX ADMIN — INSULIN LISPRO 1 UNITS: 100 INJECTION, SOLUTION INTRAVENOUS; SUBCUTANEOUS at 22:08

## 2022-10-28 RX ADMIN — PIPERACILLIN AND TAZOBACTAM 3.38 G: 3; .375 INJECTION, POWDER, LYOPHILIZED, FOR SOLUTION INTRAVENOUS at 05:23

## 2022-10-28 RX ADMIN — ALPRAZOLAM 0.25 MG: 0.25 TABLET ORAL at 11:20

## 2022-10-28 RX ADMIN — INSULIN LISPRO 5 UNITS: 100 INJECTION, SOLUTION INTRAVENOUS; SUBCUTANEOUS at 07:59

## 2022-10-28 RX ADMIN — ATORVASTATIN CALCIUM 40 MG: 40 TABLET, FILM COATED ORAL at 16:30

## 2022-10-28 RX ADMIN — HEPARIN SODIUM 5000 UNITS: 5000 INJECTION INTRAVENOUS; SUBCUTANEOUS at 22:07

## 2022-10-28 RX ADMIN — PIPERACILLIN AND TAZOBACTAM 3.38 G: 3; .375 INJECTION, POWDER, LYOPHILIZED, FOR SOLUTION INTRAVENOUS at 10:58

## 2022-10-28 RX ADMIN — HEPARIN SODIUM 5000 UNITS: 5000 INJECTION INTRAVENOUS; SUBCUTANEOUS at 05:23

## 2022-10-28 RX ADMIN — INSULIN GLARGINE 30 UNITS: 100 INJECTION, SOLUTION SUBCUTANEOUS at 22:08

## 2022-10-28 RX ADMIN — OXYCODONE HYDROCHLORIDE 5 MG: 5 TABLET ORAL at 12:20

## 2022-10-28 RX ADMIN — SERTRALINE HYDROCHLORIDE 100 MG: 100 TABLET ORAL at 08:00

## 2022-10-28 RX ADMIN — GABAPENTIN 200 MG: 100 CAPSULE ORAL at 17:36

## 2022-10-28 RX ADMIN — CARVEDILOL 25 MG: 25 TABLET, FILM COATED ORAL at 07:57

## 2022-10-28 RX ADMIN — AMLODIPINE BESYLATE 10 MG: 10 TABLET ORAL at 08:00

## 2022-10-28 RX ADMIN — OXYCODONE HYDROCHLORIDE 5 MG: 5 TABLET ORAL at 02:07

## 2022-10-28 RX ADMIN — POTASSIUM CHLORIDE 40 MEQ: 1500 TABLET, EXTENDED RELEASE ORAL at 08:00

## 2022-10-28 RX ADMIN — ALPRAZOLAM 0.25 MG: 0.25 TABLET ORAL at 02:07

## 2022-10-28 RX ADMIN — MORPHINE SULFATE 4 MG: 4 INJECTION INTRAVENOUS at 18:33

## 2022-10-28 RX ADMIN — CARVEDILOL 25 MG: 25 TABLET, FILM COATED ORAL at 16:30

## 2022-10-28 RX ADMIN — ASPIRIN 81 MG CHEWABLE TABLET 81 MG: 81 TABLET CHEWABLE at 08:00

## 2022-10-28 RX ADMIN — HEPARIN SODIUM 5000 UNITS: 5000 INJECTION INTRAVENOUS; SUBCUTANEOUS at 16:30

## 2022-10-28 RX ADMIN — GABAPENTIN 200 MG: 100 CAPSULE ORAL at 08:00

## 2022-10-28 RX ADMIN — FUROSEMIDE 20 MG: 10 INJECTION, SOLUTION INTRAMUSCULAR; INTRAVENOUS at 12:34

## 2022-10-28 RX ADMIN — OXYCODONE HYDROCHLORIDE 5 MG: 5 TABLET ORAL at 16:38

## 2022-10-28 NOTE — ASSESSMENT & PLAN NOTE
Lab Results   Component Value Date    HGBA1C 9 8 (H) 10/25/2022     Recent Labs     10/27/22  1132 10/27/22  1614 10/27/22  2117 10/28/22  0703   POCGLU 155* 137 99 78     · Uncontrolled based on prior A1C, will update  · Apparently has been rationing glargine due to cost  · Patient with low normal blood glucose, will decrease Lantus from 60 to 30 units daily, discontinue pre meal Humalog for now, leave sliding scale alone

## 2022-10-28 NOTE — CONSULTS
e-Consult (IPC)  - Interventional Radiology  Arlyss Abu 65 y o  female MRN: [id number]  Unit/Bed#: E2 -01 Encounter: [id number]          Interventional Radiology has been consulted to evaluate Arlyss Abu    We were consulted by Damaris Savage PA-C concerning this patient with nonhealing heel ulcer  IP Consult to IR  Consult performed by: Fausto Stone MD  Consult ordered by: Damaris Savage PA-C        10/28/22    Assessment/Recommendation:   66 yo female with bilateral non healing ulcers worse on the left with CKD, PVD, and HTN s/p CTA with right ALLISON stenoses and multiple level left SFA-POP stenoses referred for revascularization  Patient's renal functino appears to have recovered since admission  INR is appropriate  Will tentatively plan for procedure on Monday  Plan for possible right ALLISON stent and left SFA/POP intervention  11-20 minutes, >50% of the total time devoted to medical consultative verbal/EMR discussion between providers  Written report will be generated in the EMR  Thank you for allowing Interventional Radiology to participate in the care of Arlyss Abu  Please don't hesitate to call or TigerText us with any questions       Fausto Stone MD

## 2022-10-28 NOTE — ASSESSMENT & PLAN NOTE
65year old female former smoker w/HTN, HLD, uncontrolled type II DM (A1c 9 8), hx CVA, presenting with nonhealing extensive L heel ulceration and R hallux and 5th digit ulceration  Vascular surgery consulted for input  TARI duplex 10/24/22: Rt- monophasic waveforms of the CFA indicative of proximal disease, SHARIF 0 80/64/61  Lt- diffuse disease without focal stenosis, SHARIF 0 61/21/35    CTA abd w/run off 10/27/22: Pending formal read  R ALLISON/EIA with calcified stenosis however remains patent  LLE with SFA and pop stenoses  Appears to have 3 vessel run off to the foot     MRI L heel: no evidence of OM    Recommendations:  -Bilateral tissue loss in the setting of uncontrolled type II DM and NYDIA on admission  -TARI demonstrates adequate pressures on the RLE however LLE with pressures below healing    -Now s/p CTA with run off which demonstrates R iliac disease with diffuse fem/pop disease bilaterally  L pop with high grade stenosis/possible occlusion  -D/w Dr Lois Phan, L heel ulceration stable without OM  RLE pending MRI however wounds appear stable  -Recommend abdominal aortogram w/ LLE run off, possible R iliac intervention, possible LLE intervention  D/w IR  -Medical management with ASA, statin  -Continue local wound care per podiatry  -Medical management and glucose control per [facility]  -Nephrology on board   Appreciate recommendations  -D/w Dr Nixon Andrade

## 2022-10-28 NOTE — PROGRESS NOTES
[address] [facility]  Progress Note - [address] [facility] [address] 1960, 65 y o  female MRN: [id number]  Unit/Bed#: E2 -01 Encounter: [id number]  Primary Care Provider: ALEX Delatorre Si   Date and time admitted to hospital: 10/21/2022  7:58 PM    * Non healing left heel wound  Assessment & Plan  · In setting of uncontrolled diabetes   · Arterial duplex reviewed, "Great toe pressure of 35 mm Hg, below healing threshold for a diabetic"  · MRI of the foot noted for "a large plantar lateral heel ulcer, without underlying soft tissue abscess (series 5 image 5-15 ) Associated with this ulcer, there is only very minimal reactive marrow edema in the calcaneal tubercle evident on T2-weighted sequences (series 3 image 2, series 7 image 8 ) No confluent T1-weighted marrow abnormality or cortical destruction to suggest osteomyelitis "  · CTA of abdomen/pelvis done, report pending  · Continue Zosyn for now - request ID consult     Sepsis [facility])  Assessment & Plan  Present on admission with Leucocytosis and tachycardia  Due to left foot wound  Resolved  Continued on IV Zosyn  Blood cultures no growth 4 days    Generalized edema due to fluid overload  Assessment & Plan  Iatrogenic fluid overload, patient requiring IV fluids in setting of NYDIA, contrast studies  Received Lasix 20 mg IV x1, may need additional diuretics     NYDIA (acute kidney injury) ([facility] [address] )  Assessment & Plan  Resolved  Patient is getting IV fluids in preparation for a CTA study  She does show signs of fluid overload, will anticipate initiation of diuretics following CTA  Nephrology is following      PAD (peripheral artery disease) ([facility])  Assessment & Plan  Arterial Duplex reveals "Great toe pressure of 35 mm Hg, below healing threshold for a diabetic "  Vascular surgery input appreciated, plan for CT of abdomen and pelvis to further determine intervention  Proceed with aspirin, statin therapy         Hypokalemia  Assessment & Plan  · Secondary to vomiting, improved with replacement   · Continue replacement and monitoring     Results from last 7 days   Lab Units 10/27/22  0554 10/26/22  1125 10/25/22  0524 10/24/22  0437 10/23/22  0535 10/22/22  1420 10/22/22  0502 10/21/22  2108   POTASSIUM mmol/L 3 1* 3 8 3 2* 2 8* 2 9* 2 8* 2 4* 2 4*       Hypertensive urgency  Assessment & Plan  · Felt to be due to patient underdosing her meds because of cost   · Toprol changed to carvedilol with good response  · Continue lisinopril 40 mg and amlodipine 10 mg daily    Type 2 diabetes mellitus with hyperglycemia, with long-term current use of insulin [facility])  Assessment & Plan  Lab Results   Component Value Date    HGBA1C 9 8 (H) 10/25/2022     Recent Labs     10/27/22  1132 10/27/22  1614 10/27/22  2117 10/28/22  0703   POCGLU 155* 137 99 78     · Uncontrolled based on prior A1C, will update  · Apparently has been rationing glargine due to cost  · Patient with low normal blood glucose, will decrease Lantus from 60 to 30 units daily, discontinue pre meal Humalog for now, leave sliding scale alone    Benign essential hypertension  Assessment & Plan  Controlled with resumption of home medications, continue current regimen    Depression, recurrent (HCC)  Assessment & Plan  · Controlled  · Continue sertraline, Xanax PRN        VTE Pharmacologic Prophylaxis: VTE Score: 4 Moderate Risk (Score 3-4) - Pharmacological DVT Prophylaxis Ordered: heparin  Patient Centered Rounds: I performed bedside rounds with nursing staff today  Discussions with Specialists or Other Care Team Provider: will d/w ID    Education and Discussions with Family / Patient: patient   Time Spent for Care: 45 minutes  More than 50% of total time spent on counseling and coordination of care as described above      Current Length of Stay: 6 day(s)  Current Patient Status: Inpatient   Certification Statement: The patient will continue to require additional inpatient hospital stay due to IV Rx, diagnostic workup, potential surgical interventions   Discharge Plan: Anticipate discharge in 48-72 hrs to rehab facility  vs home    Code Status: Level 1 - Full Code    Subjective:   Patient seen and examined  No new complaints  Awaiting next steps in her medical care  Objective:     Vitals:   Temp (24hrs), Av 1 °F (36 2 °C), Min:96 7 °F (35 9 °C), Max:97 6 °F (36 4 °C)    Temp:  [96 7 °F (35 9 °C)-97 6 °F (36 4 °C)] 96 7 °F (35 9 °C)  HR:  [80-94] 80  Resp:  [18-20] 18  BP: (138-205)/(63-91) 138/70  SpO2:  [90 %-96 %] 90 %  Body mass index is 37 34 kg/m²  Input and Output Summary (last 24 hours): Intake/Output Summary (Last 24 hours) at 10/28/2022 0745  Last data filed at 10/28/2022 0553  Gross per 24 hour   Intake 1760 ml   Output 1300 ml   Net 460 ml       Physical Exam:   Physical Exam  Constitutional:       General: She is not in acute distress  HENT:      Head: Normocephalic and atraumatic  Nose: No congestion  Mouth/Throat:      Pharynx: Oropharynx is clear  Eyes:      Conjunctiva/sclera: Conjunctivae normal    Cardiovascular:      Rate and Rhythm: Normal rate and regular rhythm  Heart sounds: No murmur heard  Pulmonary:      Effort: No respiratory distress  Breath sounds: No wheezing or rales  Abdominal:      General: There is no distension  Tenderness: There is no abdominal tenderness  There is no guarding  Musculoskeletal:      Right lower leg: Edema present  Left lower leg: Edema present  Skin:     Comments: LLE dressing    Neurological:      Mental Status: She is oriented to person, place, and time     Psychiatric:         Mood and Affect: Mood normal           Additional Data:     Labs:  Results from last 7 days   Lab Units 10/28/22  0455   WBC Thousand/uL 13 85*   HEMOGLOBIN g/dL 8 5*   HEMATOCRIT % 28 0*   PLATELETS Thousands/uL 359   NEUTROS PCT % 78*   LYMPHS PCT % 11*   MONOS PCT % 7   EOS PCT % 2     Results from last 7 days   Lab Units 10/27/22  0554 10/25/22  0524 10/24/22  0437   SODIUM mmol/L 142   < > 141   POTASSIUM mmol/L 3 1*   < > 2 8*   CHLORIDE mmol/L 107   < > 105   CO2 mmol/L 25   < > 28   BUN mg/dL 14   < > 19   CREATININE mg/dL 1 13   < > 1 44*   ANION GAP mmol/L 10   < > 8   CALCIUM mg/dL 8 7   < > 8 7   ALBUMIN g/dL  --   --  2 2*   TOTAL BILIRUBIN mg/dL  --   --  0 24   ALK PHOS U/L  --   --  100   ALT U/L  --   --  26   AST U/L  --   --  39   GLUCOSE RANDOM mg/dL 70   < > 86    < > = values in this interval not displayed  Results from last 7 days   Lab Units 10/21/22  2108   INR  1 00     Results from last 7 days   Lab Units 10/28/22  0703 10/27/22  2117 10/27/22  1614 10/27/22  1132 10/27/22  0736 10/26/22  2056 10/26/22  1604 10/26/22  1134 10/26/22  0718 10/25/22  2057 10/25/22  1639 10/25/22  1107   POC GLUCOSE mg/dl 78 99 137 155* 80 140 137 105 90 181* 135 115     Results from last 7 days   Lab Units 10/25/22  0524   HEMOGLOBIN A1C % 9 8*     Results from last 7 days   Lab Units 10/21/22  2108   LACTIC ACID mmol/L 0 5   PROCALCITONIN ng/ml 0 06       Lines/Drains:  Invasive Devices  Report    Peripheral Intravenous Line  Duration           Peripheral IV 10/24/22 Left;Ventral (anterior) Forearm 4 days    Long-Dwell Peripheral IV (Midline) 10/27/22 Left Brachial <1 day                      Imaging: Reviewed radiology reports from this admission including: MRI LLE  and Personally reviewed the following imaging: MRI LLE     Recent Cultures (last 7 days):   Results from last 7 days   Lab Units 10/21/22  2128 10/21/22  2108 10/21/22  2107   BLOOD CULTURE   --  No Growth After 5 Days  No Growth After 5 Days     URINE CULTURE  >100,000 cfu/ml Escherichia coli*  --   --        Last 24 Hours Medication List:   Current Facility-Administered Medications   Medication Dose Route Frequency Provider Last Rate   • acetaminophen  650 mg Oral Q6H PRN Sugey Wiseman PA-C     • albuterol  2 5 mg Nebulization Q6H PRN Charisse Kennedy MD     • ALPRAZolam  0 25 mg Oral Daily PRN Sugey Wiseman PA-C     • amLODIPine  10 mg Oral Daily Valandrew Poseyse, CRNP     • aspirin  81 mg Oral Daily Sugey Wiseman PA-C     • atorvastatin  40 mg Oral Daily With Asher Salmeron MD     • carvedilol  25 mg Oral BID With Meals Valdoraan Glimpse, CRNP     • gabapentin  200 mg Oral BID Sugey Wiseman PA-C     • heparin (porcine)  5,000 Units Subcutaneous Q8H [address] Sugey Wiseman PA-C     • hydrALAZINE  10 mg Intravenous Q6H PRN Sugey Wiseman PA-C     • insulin glargine  30 Units Subcutaneous HS Shi Lucas MD     • insulin lispro  1-6 Units Subcutaneous 4x Daily (AC & HS) Sugey Wiseman PA-C     • metoclopramide  5 mg Intravenous Q6H PRN Vivia Satish, DO     • morphine injection  4 mg Intravenous Q4H PRN Netta Satish, DO     • ondansetron  4 mg Intravenous Q6H PRN Sugey Wiseman PA-C     • oxyCODONE  5 mg Oral Q4H PRN Vivia Satish, DO     • piperacillin-tazobactam  3 375 g Intravenous Q6H Sugey Wiseman PA-C Stopped (10/28/22 0553)   • potassium chloride  40 mEq Oral BID Shi Lucas MD     • sertraline  100 mg Oral Daily Sugey Neal PA-C          Today, Patient Was Seen By: Shi Lucas    **Please Note: This note may have been constructed using a voice recognition system  **

## 2022-10-28 NOTE — PLAN OF CARE
Problem: Potential for Falls  Goal: Patient will remain free of falls  Description: INTERVENTIONS:  - Educate patient/family on patient safety including physical limitations  - Instruct patient to call for assistance with activity   - Consult OT/PT to assist with strengthening/mobility   - Keep Call bell within reach  - Keep bed low and locked with side rails adjusted as appropriate  - Keep care items and personal belongings within reach  - Initiate and maintain comfort rounds  - Make Fall Risk Sign visible to staff  - Offer Toileting every 2 Hours, in advance of need  - Initiate/Maintain bed  chair alarm  - Obtain necessary fall risk management equipment: bed chair alarm, call bell, gripper sock, walker, bedside commode  - Apply yellow socks and bracelet for high fall risk patients  - Consider moving patient to room near nurses station  Outcome: Progressing     Problem: MOBILITY - ADULT  Goal: Maintain or return to baseline ADL function  Description: INTERVENTIONS:  -  Assess patient's ability to carry out ADLs; assess patient's baseline for ADL function and identify physical deficits which impact ability to perform ADLs (bathing, care of mouth/teeth, toileting, grooming, dressing, etc )  - Assess/evaluate cause of self-care deficits   - Assess range of motion  - Assess patient's mobility; develop plan if impaired  - Assess patient's need for assistive devices and provide as appropriate  - Encourage maximum independence but intervene and supervise when necessary  - Involve family in performance of ADLs  - Assess for home care needs following discharge   - Consider OT consult to assist with ADL evaluation and planning for discharge  - Provide patient education as appropriate  Outcome: Progressing     Problem: PAIN - ADULT  Goal: Verbalizes/displays adequate comfort level or baseline comfort level  Description: Interventions:  - Encourage patient to monitor pain and request assistance  - Assess pain using appropriate pain scale  - Administer analgesics based on type and severity of pain and evaluate response  - Implement non-pharmacological measures as appropriate and evaluate response  - Consider cultural and social influences on pain and pain management  - Notify physician/advanced practitioner if interventions unsuccessful or patient reports new pain  Outcome: Progressing     Problem: INFECTION - ADULT  Goal: Absence or prevention of progression during hospitalization  Description: INTERVENTIONS:  - Assess and monitor for signs and symptoms of infection  - Monitor lab/diagnostic results  - Monitor all insertion sites, i e  indwelling lines, tubes, and drains  - Administer medications as ordered  - Instruct and encourage patient and family to use good hand hygiene technique  Outcome: Progressing     Problem: SAFETY ADULT  Goal: Patient will remain free of falls  Description: INTERVENTIONS:  - Educate patient/family on patient safety including physical limitations  - Instruct patient to call for assistance with activity   - Consult OT/PT to assist with strengthening/mobility   - Keep Call bell within reach  - Keep bed low and locked with side rails adjusted as appropriate  - Keep care items and personal belongings within reach  - Initiate and maintain comfort rounds  - Make Fall Risk Sign visible to staff  - Offer Toileting every 2 Hours, in advance of need  - Initiate/Maintain bed  chair alarm  - Obtain necessary fall risk management equipment: bed chair alarm, call bell, gripper sock, walker, bedside commode  - Apply yellow socks and bracelet for high fall risk patients  - Consider moving patient to room near nurses station  Outcome: Progressing  Goal: Maintain or return to baseline ADL function  Description: INTERVENTIONS:  -  Assess patient's ability to carry out ADLs; assess patient's baseline for ADL function and identify physical deficits which impact ability to perform ADLs (bathing, care of mouth/teeth, toileting, grooming, dressing, etc )  - Assess/evaluate cause of self-care deficits   - Assess range of motion  - Assess patient's mobility; develop plan if impaired  - Assess patient's need for assistive devices and provide as appropriate  - Encourage maximum independence but intervene and supervise when necessary  - Involve family in performance of ADLs  - Assess for home care needs following discharge   - Consider OT consult to assist with ADL evaluation and planning for discharge  - Provide patient education as appropriate  Outcome: Progressing     Problem: METABOLIC, FLUID AND ELECTROLYTES - ADULT  Goal: Glucose maintained within target range  Description: INTERVENTIONS:  - Monitor Blood Glucose as ordered  - Assess for signs and symptoms of hyperglycemia and hypoglycemia  - Administer ordered medications to maintain glucose within target range  - Assess nutritional intake and initiate nutrition service referral as needed  Outcome: Progressing     Problem: SKIN/TISSUE INTEGRITY - ADULT  Goal: Skin Integrity remains intact(Skin Breakdown Prevention)  Description: Assess:  -Perform Nicanor assessment every shift  -Clean and moisturize skin every shift  -Inspect skin when repositioning, toileting, and assisting with ADLS  -Assess under medical devices   -Assess extremities for adequate circulation and sensation     Bed Management:  -Have minimal linens on bed & keep smooth, unwrinkled  -Change linens as needed when moist or perspiring  -Avoid sitting or lying in one position for more than 2 hours while in bed    Toileting:  -Offer bedside commode  -Assess for incontinence every 2 hours  -Use incontinent care products after each incontinent episode     Activity:  -Mobilize patient 3 times a day  -Encourage or provide ROM exercises   -Turn and reposition patient every 2 Hours  -Use appropriate equipment to lift or move patient in bed  -Instruct/ Assist with weight shifting every 15 minutes when out of bed in chair  -Consider limitation of chair time 1 hour intervals    Skin Care:  -Avoid use of baby powder, tape, friction and shearing, hot water or constrictive clothing  -Relieve pressure over bony prominences using waffle cushion when in chair  -Do not massage red bony areas    Outcome: Progressing

## 2022-10-28 NOTE — ASSESSMENT & PLAN NOTE
· In setting of uncontrolled diabetes   · Arterial duplex reviewed, "Great toe pressure of 35 mm Hg, below healing threshold for a diabetic"  · MRI of the foot noted for "a large plantar lateral heel ulcer, without underlying soft tissue abscess (series 5 image 5-15 ) Associated with this ulcer, there is only very minimal reactive marrow edema in the calcaneal tubercle evident on T2-weighted sequences (series 3 image 2, series 7 image 8 ) No confluent T1-weighted marrow abnormality or cortical destruction to suggest osteomyelitis "  · CTA of abdomen/pelvis done, report pending  · Continue Zosyn for now - request ID consult

## 2022-10-28 NOTE — ASSESSMENT & PLAN NOTE
Iatrogenic fluid overload, patient requiring IV fluids in setting of NYDIA, contrast studies  Received Lasix 20 mg IV x1, may need additional diuretics

## 2022-10-28 NOTE — PLAN OF CARE
Problem: Potential for Falls  Goal: Patient will remain free of falls  Description: INTERVENTIONS:  - Educate patient/family on patient safety including physical limitations  - Instruct patient to call for assistance with activity   - Consult OT/PT to assist with strengthening/mobility   - Keep Call bell within reach  - Keep bed low and locked with side rails adjusted as appropriate  - Keep care items and personal belongings within reach  - Initiate and maintain comfort rounds  - Make Fall Risk Sign visible to staff  - Offer Toileting every 2 Hours, in advance of need  - Initiate/Maintain bed  chair alarm  - Obtain necessary fall risk management equipment: bed chair alarm, call bell, gripper sock, walker, bedside commode  - Apply yellow socks and bracelet for high fall risk patients  - Consider moving patient to room near nurses station  Outcome: Progressing     Problem: MOBILITY - ADULT  Goal: Maintain or return to baseline ADL function  Description: INTERVENTIONS:  -  Assess patient's ability to carry out ADLs; assess patient's baseline for ADL function and identify physical deficits which impact ability to perform ADLs (bathing, care of mouth/teeth, toileting, grooming, dressing, etc )  - Assess/evaluate cause of self-care deficits   - Assess range of motion  - Assess patient's mobility; develop plan if impaired  - Assess patient's need for assistive devices and provide as appropriate  - Encourage maximum independence but intervene and supervise when necessary  - Involve family in performance of ADLs  - Assess for home care needs following discharge   - Consider OT consult to assist with ADL evaluation and planning for discharge  - Provide patient education as appropriate  Outcome: Progressing  Goal: Maintains/Returns to pre admission functional level  Description: INTERVENTIONS:  - Perform BMAT or MOVE assessment daily    - Set and communicate daily mobility goal to care team and patient/family/caregiver     - Collaborate with rehabilitation services on mobility goals if consulted  - Assist patient in repositioning every 2 hours    - Dangle patient 3 times a day  - Stand patient 3 times a day  - Out of bed to chair as tolerated  - Out of bed for meals  - Out of bed for toileting  - Record patient progress and toleration of activity level   Outcome: Progressing     Problem: PAIN - ADULT  Goal: Verbalizes/displays adequate comfort level or baseline comfort level  Description: Interventions:  - Encourage patient to monitor pain and request assistance  - Assess pain using appropriate pain scale  - Administer analgesics based on type and severity of pain and evaluate response  - Implement non-pharmacological measures as appropriate and evaluate response  - Consider cultural and social influences on pain and pain management  - Notify physician/advanced practitioner if interventions unsuccessful or patient reports new pain  Outcome: Progressing     Problem: INFECTION - ADULT  Goal: Absence or prevention of progression during hospitalization  Description: INTERVENTIONS:  - Assess and monitor for signs and symptoms of infection  - Monitor lab/diagnostic results  - Monitor all insertion sites, i e  indwelling lines, tubes, and drains  - Administer medications as ordered  - Instruct and encourage patient and family to use good hand hygiene technique  Outcome: Progressing     Problem: SAFETY ADULT  Goal: Patient will remain free of falls  Description: INTERVENTIONS:  - Educate patient/family on patient safety including physical limitations  - Instruct patient to call for assistance with activity   - Consult OT/PT to assist with strengthening/mobility   - Keep Call bell within reach  - Keep bed low and locked with side rails adjusted as appropriate  - Keep care items and personal belongings within reach  - Initiate and maintain comfort rounds  - Make Fall Risk Sign visible to staff  - Offer Toileting every 2 Hours, in advance of need  - Initiate/Maintain bed  chair alarm  - Obtain necessary fall risk management equipment: bed chair alarm, call bell, gripper sock, walker, bedside commode  - Apply yellow socks and bracelet for high fall risk patients  - Consider moving patient to room near nurses station  Outcome: Progressing  Goal: Maintain or return to baseline ADL function  Description: INTERVENTIONS:  -  Assess patient's ability to carry out ADLs; assess patient's baseline for ADL function and identify physical deficits which impact ability to perform ADLs (bathing, care of mouth/teeth, toileting, grooming, dressing, etc )  - Assess/evaluate cause of self-care deficits   - Assess range of motion  - Assess patient's mobility; develop plan if impaired  - Assess patient's need for assistive devices and provide as appropriate  - Encourage maximum independence but intervene and supervise when necessary  - Involve family in performance of ADLs  - Assess for home care needs following discharge   - Consider OT consult to assist with ADL evaluation and planning for discharge  - Provide patient education as appropriate  Outcome: Progressing  Goal: Maintains/Returns to pre admission functional level  Description: INTERVENTIONS:  - Perform BMAT or MOVE assessment daily    - Set and communicate daily mobility goal to care team and patient/family/caregiver  - Collaborate with rehabilitation services on mobility goals if consulted  - Assist patient in repositioning every 2 hours    - Dangle patient 3 times a day  - Stand patient 3 times a day  - Out of bed to chair as tolerated  - Out of bed for meals  - Out of bed for toileting  - Record patient progress and toleration of activity level   Outcome: Progressing     Problem: DISCHARGE PLANNING  Goal: Discharge to home or other facility with appropriate resources  Description: INTERVENTIONS:  - Identify barriers to discharge w/patient   - Arrange for needed discharge resources and transportation as appropriate  - Identify discharge learning needs  - Refer to Case Management Department for coordinating discharge planning if the patient needs post-hospital services based on physician/advanced practitioner order   Outcome: Progressing     Problem: Knowledge Deficit  Goal: Patient/family/caregiver demonstrates understanding of disease process, treatment plan, medications, and discharge instructions  Description: Complete learning assessment and assess knowledge base    Interventions:  - Provide teaching at level of understanding  - Provide teaching via preferred learning methods  Outcome: Progressing     Problem: METABOLIC, FLUID AND ELECTROLYTES - ADULT  Goal: Electrolytes maintained within normal limits  Description: INTERVENTIONS:  - Monitor labs and assess patient for signs and symptoms of electrolyte imbalances  - Administer electrolyte replacement as ordered  - Monitor response to electrolyte replacements, including repeat lab results as appropriate  - Instruct patient on fluid and nutrition as appropriate  Outcome: Progressing  Goal: Fluid balance maintained  Description: INTERVENTIONS:  - Monitor labs   - Monitor I/O and WT  - Instruct patient on fluid and nutrition as appropriate  - Assess for signs & symptoms of volume excess or deficit  Outcome: Progressing  Goal: Glucose maintained within target range  Description: INTERVENTIONS:  - Monitor Blood Glucose as ordered  - Assess for signs and symptoms of hyperglycemia and hypoglycemia  - Administer ordered medications to maintain glucose within target range  - Assess nutritional intake and initiate nutrition service referral as needed  Outcome: Progressing     Problem: SKIN/TISSUE INTEGRITY - ADULT  Goal: Skin Integrity remains intact(Skin Breakdown Prevention)  Description: Assess:  -Perform Nicanor assessment every shift  -Clean and moisturize skin every shift  -Inspect skin when repositioning, toileting, and assisting with ADLS  -Assess under medical devices   -Assess extremities for adequate circulation and sensation     Bed Management:  -Have minimal linens on bed & keep smooth, unwrinkled  -Change linens as needed when moist or perspiring  -Avoid sitting or lying in one position for more than 2 hours while in bed    Toileting:  -Offer bedside commode  -Assess for incontinence every 2 hours  -Use incontinent care products after each incontinent episode     Activity:  -Mobilize patient 3 times a day  -Encourage or provide ROM exercises   -Turn and reposition patient every 2 Hours  -Use appropriate equipment to lift or move patient in bed  -Instruct/ Assist with weight shifting every 15 minutes when out of bed in chair  -Consider limitation of chair time 1 hour intervals    Skin Care:  -Avoid use of baby powder, tape, friction and shearing, hot water or constrictive clothing  -Relieve pressure over bony prominences using waffle cushion when in chair  -Do not massage red bony areas    Outcome: Progressing  Goal: Incision(s), wounds(s) or drain site(s) healing without S/S of infection  Description: INTERVENTIONS  - Assess and document dressing, incision, wound bed, drain sites and surrounding tissue  - Provide patient and family education  - Perform skin care/dressing changes everyday as ordered  Outcome: Progressing  Goal: Pressure injury heals and does not worsen  Description: Interventions:  - Implement low air loss mattress or specialty surface (Criteria met)  - Apply silicone foam dressing  - Instruct/assist with weight shifting every 15 minutes when in chair   - Limit chair time to 1 hour intervals  - Use special pressure reducing interventions such as waffle cushion when in chair   - Perform passive or active ROM   - Turn and reposition patient & offload bony prominences every 2 hours   - Utilize friction reducing device or surface for transfers   - Consider consults to  interdisciplinary teams such as wound care, PT/OT  - Use incontinent care products after each incontinent episode   - Consider nutrition services referral as needed  Outcome: Progressing     Problem: MUSCULOSKELETAL - ADULT  Goal: Maintain or return mobility to safest level of function  Description: INTERVENTIONS:  - Assess patient's ability to carry out ADLs; assess patient's baseline for ADL function and identify physical deficits which impact ability to perform ADLs (bathing, care of mouth/teeth, toileting, grooming, dressing, etc )  - Assess/evaluate cause of self-care deficits   - Assess range of motion  - Assess patient's mobility  - Assess patient's need for assistive devices and provide as appropriate  - Encourage maximum independence but intervene and supervise when necessary  - Involve family in performance of ADLs  - Assess for home care needs following discharge   - Consider OT consult to assist with ADL evaluation and planning for discharge  - Provide patient education as appropriate  Outcome: Progressing  Goal: Maintain proper alignment of affected body part  Description: INTERVENTIONS:  - Support, maintain and protect limb and body alignment  - Provide patient/ family with appropriate education  Outcome: Progressing

## 2022-10-28 NOTE — PROGRESS NOTES
[facility]  Progress Note - [address] 1960, 65 y o  female MRN: [id number]  Unit/Bed#: E2 -01 Encounter: [id number]  Primary Care Provider: ALEX Reid   Date and time admitted to hospital: 10/21/2022  7:58 PM    PAD (peripheral artery disease) [facility])  Assessment & Plan  65year old female former smoker w/HTN, HLD, uncontrolled type II DM (A1c 9 8), hx CVA, presenting with nonhealing extensive L heel ulceration and R hallux and 5th digit ulceration  Vascular surgery consulted for input  TARI duplex 10/24/22: Rt- monophasic waveforms of the CFA indicative of proximal disease, SHARIF 0 80/64/61  Lt- diffuse disease without focal stenosis, SHARIF 0 61/21/35    CTA abd w/run off 10/27/22: Pending formal read  R ALLISON/EIA with calcified stenosis however remains patent  LLE with SFA and pop stenoses  Appears to have 3 vessel run off to the foot     MRI L heel: no evidence of OM    Recommendations:  -Bilateral tissue loss in the setting of uncontrolled type II DM and NYDIA on admission  -TARI demonstrates adequate pressures on the RLE however LLE with pressures below healing    -Now s/p CTA with run off which demonstrates R iliac disease with diffuse fem/pop disease bilaterally  L pop with high grade stenosis/possible occlusion  -D/w Dr Dora Warren, L heel ulceration stable without OM  RLE pending MRI however wounds appear stable  -Recommend abdominal aortogram w/ LLE run off, possible R iliac intervention, possible LLE intervention  D/w IR  -Medical management with ASA, statin  -Continue local wound care per podiatry  -Medical management and glucose control per [facility]  -Nephrology on board  Appreciate recommendations  -D/w Dr Tena Humphrey      Subjective:  Patient offers no complaints  Working with physical therapy    Remains hemodynamically stable    Vitals:  /70 (BP Location: Right arm)   Pulse 80   Temp (!) 96 7 °F (35 9 °C) (Temporal)   Resp 18   Wt 97 9 kg (215 lb 13 3 oz)   SpO2 90%   BMI 37 34 kg/m²     I/Os:  I/O last 3 completed shifts: In: 1246 [P O :480; I V :1080; IV Piggyback:200]  Out: 1300 [Urine:1300]  No intake/output data recorded  Lab Results and Cultures:   CBC with diff: Lab Results   Component Value Date    WBC 13 85 (H) 10/28/2022    HGB 8 5 (L) 10/28/2022    HCT 28 0 (L) 10/28/2022    MCV 90 10/28/2022     10/28/2022    MCH 27 2 10/28/2022    MCHC 30 4 (L) 10/28/2022    RDW 15 3 (H) 10/28/2022    MPV 9 1 10/28/2022    NRBC 0 10/28/2022   ,   BMP/CMP:  Lab Results   Component Value Date    SODIUM 138 10/28/2022    K 4 7 10/28/2022     10/28/2022    CO2 27 10/28/2022    BUN 16 10/28/2022    CREATININE 1 20 10/28/2022    CALCIUM 8 9 10/28/2022    AST 39 10/24/2022    ALT 26 10/24/2022    ALKPHOS 100 10/24/2022    EGFR 48 10/28/2022   ,   Lipid Panel: No results found for: CHOL,   Coags:   Lab Results   Component Value Date    PTT 28 10/21/2022    INR 1 00 10/21/2022   ,     Blood Culture:   Lab Results   Component Value Date    BLOODCX No Growth After 5 Days   10/21/2022   ,   Urinalysis: Lab Results   Component Value Date    COLORU Light Yellow 10/21/2022    CLARITYU Turbid 10/21/2022    SPECGRAV >=1 030 10/21/2022    PHUR 6 0 10/21/2022    PHUR 6 5 02/23/2018    LEUKOCYTESUR Negative 10/21/2022    NITRITE Negative 10/21/2022    GLUCOSEU 250 (1/4%) (A) 10/21/2022    KETONESU Negative 10/21/2022    BILIRUBINUR Negative 10/21/2022    BLOODU Large (A) 10/21/2022   ,   Urine Culture:   Lab Results   Component Value Date    URINECX >100,000 cfu/ml Escherichia coli (A) 10/21/2022   ,   Wound Culure: No results found for: WOUNDCULT    Medications:  Current Facility-Administered Medications   Medication Dose Route Frequency   • acetaminophen (TYLENOL) tablet 650 mg  650 mg Oral Q6H PRN   • albuterol inhalation solution 2 5 mg  2 5 mg Nebulization Q6H PRN   • ALPRAZolam (XANAX) tablet 0 25 mg  0 25 mg Oral Daily PRN   • amLODIPine (NORVASC) tablet 10 mg  10 mg Oral Daily   • aspirin chewable tablet 81 mg  81 mg Oral Daily   • atorvastatin (LIPITOR) tablet 40 mg  40 mg Oral Daily With Dinner   • carvedilol (COREG) tablet 25 mg  25 mg Oral BID With Meals   • gabapentin (NEURONTIN) capsule 200 mg  200 mg Oral BID   • heparin (porcine) subcutaneous injection 5,000 Units  5,000 Units Subcutaneous Q8H [facility]   • hydrALAZINE (APRESOLINE) injection 10 mg  10 mg Intravenous Q6H PRN   • insulin glargine (LANTUS) subcutaneous injection 30 Units 0 3 mL  30 Units Subcutaneous HS   • insulin lispro (HumaLOG) 100 units/mL subcutaneous injection 1-6 Units  1-6 Units Subcutaneous 4x Daily (AC & HS)   • metoclopramide (REGLAN) injection 5 mg  5 mg Intravenous Q6H PRN   • morphine injection 4 mg  4 mg Intravenous Q4H PRN   • ondansetron (ZOFRAN) injection 4 mg  4 mg Intravenous Q6H PRN   • oxyCODONE (ROXICODONE) IR tablet 5 mg  5 mg Oral Q4H PRN   • sertraline (ZOLOFT) tablet 100 mg  100 mg Oral Daily       Imaging:  Reviewed  Physical Exam:    General: Alert and oriented   In no acute distress  CV: Regular rate  Respiratory: Normal effort  Abdominal: Soft, non-distended   Extremities: Bilateral wounds with bandages in place  Neurologic: Grossly normal       Priscilla Tompkins PA-C  10/28/2022

## 2022-10-28 NOTE — PROGRESS NOTES
NEPHROLOGY PROGRESS NOTE   Venkatesh Arnold 65 y o  female MRN: [id number]  Unit/Bed#: E2 -01 Encounter: [id number]  Reason for Consult: NYDIA     58-year-old female with HTN, DM 2, depression presenting with nonhealing left foot wound  Nephrology consulted for NYDIA and need for CTA,     ASSESSMENT/PLAN:  NYIDA:  Suspect multifactorial due to septic ATN vs possible contrast associated nephropathy with autoregulatory failure in the setting of ACEi  -Admission creatinine 1 1  Today's creatinine 1 2  -Peak creatinine 1 4  -baseline creatinine 0 7-1 1 since 2020  -workup: UA with glucose, large blood, >300 protein, innumerable RBCs/WBC/bacteria  -Imaging:  CT imaging 2/20/2022 with 1 or more simple renal cysts but no hydronephrosis  -nonoliguric    -Plan:  · Renal function stable and at baseline  · Clinically, no acute indication for renal replacement therapy (dialysis)  · Monitor renal function after CTA  Awaiting CTA after PICC line insertion today due to poor peripheral access  · Off IVF  · Continue to hold ACEi  · Strict I/Os, daily weights  · Avoid nephrotoxins, NSAIDs, IV contrast if possible  · Avoid hypotension  Maintain MAP >65  · Trend BMP  Check BMP in a m which will be 48 hrs after IV contrast  · Adjust meds to appropriate GFR  · Optimize hemodynamic status to avoid delay in renal recovery  · Will d/w Dr Pearce     Hypertension/Volume status:  -presented with hypertensive urgency , improved  Carvedilol increased to 25 mg b i d  last p m   -BP acceptable  -volume status mildly hypervolemic  -Home medications:  Amlodipine 10 mg daily, lisinopril 40 mg daily, Toprol XL 25 mg daily  -Current medications:  Amlodipine 10 mg daily, carvedilol 25 mg b i d  -hydralazine p r n  -consider diuretics within next 24 hrs if creat stable in am   -Optimize hemodynamic status to avoid delay in renal recovery  -recommend hold parameters on antihypertensive's for SBP <130 mmHg    -Avoid hypotension or fluctuations in blood pressure  Maintain MAP >65  -low sodium (2 gm) diet  -continue to trend     Hypokalemia:  -resolved with supplementation  -potassium 4 7  -continue to monitor     Anemia:  -Hgb 8 5,  Interval decrease  Possibly dilutional   Goal >10  -avoid IV Venofer in setting of infection  -iron studies:  Low iron and iron saturation noted  -continue to trend  -Transfuse for Hgb <7 0  -management per primary team     PAD with nonhealing left foot wound:  -bilateral foot wounds, L>R  -s/p CTA, pending report  -MRI with no OM  -continue antibiotics per primary team  -continue local wound care  -management per vascular surgery, Podiatry and primary team     UTI:  -cultures positive for E coli  -currently on antibiotics    SUBJECTIVE:  Patient wake, alert without complaints  s/p CTA and MRI yesterday  Off IVF  Creatinine in relatively stable of 1 20  s/p Lasix 20 mg IV x1 last p m  due to mild fluid overload  Dyspnea improved  Good response  VSS    A complete review of systems was performed  Pertinent positives and negatives noted in the HPI  Otherwise the review of systems was negative  OBJECTIVE:  Current Weight: Weight - Scale: 97 9 kg (215 lb 13 3 oz)  Vitals:    10/27/22 2245 10/28/22 0131 10/28/22 0531 10/28/22 0729   BP: 140/63 144/69  138/70   BP Location: Right arm Right arm  Right arm   Pulse: 90 84  80   Resp: 20 18  18   Temp: (!) 97 °F (36 1 °C)   (!) 96 7 °F (35 9 °C)   TempSrc: Temporal   Temporal   SpO2: 96% 91%  90%   Weight:   97 9 kg (215 lb 13 3 oz)        Intake/Output Summary (Last 24 hours) at 10/28/2022 1013  Last data filed at 10/28/2022 0553  Gross per 24 hour   Intake 1760 ml   Output 1300 ml   Net 460 ml     General:  Awake, alert, appears comfortable and in no acute distress  Nontoxic  Skin:  No rash, warm, good skin turgor   Eyes:  PERRL, EOMI, sclerae nonicteric   no periorbital edema   ENT:  Moist mucous membranes  Neck:  Trachea midline, symmetric  No JVD    Chest:  Clear to auscultation bilaterally without wheezes, crackles or rhonchi  CVS:  Regular rate and rhythm without murmur, gallop or rub  S1 and S2 identified and normal   No S3, S4    Abdomen:  Soft, nontender, nondistended without masses  Normal bowel sounds x 4 quadrants  No bruit  Extremities:  Warm, pink, motor and sensory intact and well perfused  No cyanosis, pallor  1+ BLE edema  Neuro:  Awake, alert, oriented x3  Grossly intact  Psych:  Appropriate affect  Mentating appropriately    Normal mental status exam     Medications:    Current Facility-Administered Medications:   •  acetaminophen (TYLENOL) tablet 650 mg, 650 mg, Oral, Q6H PRN, Sugey Wiseman PA-C, 650 mg at 10/23/22 1751  •  albuterol inhalation solution 2 5 mg, 2 5 mg, Nebulization, Q6H PRN, Amy Crespo MD, 2 5 mg at 10/27/22 2207  •  ALPRAZolam Tricia Buys) tablet 0 25 mg, 0 25 mg, Oral, Daily PRN, Sugey Wiseman PA-C, 0 25 mg at 10/27/22 1857  •  amLODIPine (NORVASC) tablet 10 mg, 10 mg, Oral, Daily, Femi Poke, CRNP, 10 mg at 10/28/22 0800  •  aspirin chewable tablet 81 mg, 81 mg, Oral, Daily, Sugey Wiseman PA-C, 81 mg at 10/28/22 0800  •  atorvastatin (LIPITOR) tablet 40 mg, 40 mg, Oral, Daily With Dinner, Kristan Jimenez MD, 40 mg at 10/27/22 1702  •  carvedilol (COREG) tablet 25 mg, 25 mg, Oral, BID With Meals, Femi Poke, CRNP, 25 mg at 10/28/22 0757  •  gabapentin (NEURONTIN) capsule 200 mg, 200 mg, Oral, BID, Sugey Wiseman PA-C, 200 mg at 10/28/22 0800  •  heparin (porcine) subcutaneous injection 5,000 Units, 5,000 Units, Subcutaneous, Q8H [facility], 5,000 Units at 10/28/22 0523 **AND** [CANCELED] Platelet count, , , Once, Sugey Wiseman PA-C  •  hydrALAZINE (APRESOLINE) injection 10 mg, 10 mg, Intravenous, Q6H PRN, Sugey Wiseman PA-C, 10 mg at 10/27/22 1857  •  insulin glargine (LANTUS) subcutaneous injection 30 Units 0 3 mL, 30 Units, Subcutaneous, HS, Amy Parry MD  •  insulin lispro (HumaLOG) 100 units/mL subcutaneous injection 1-6 Units, 1-6 Units, Subcutaneous, 4x Daily (AC & HS), 1 Units at 10/27/22 1138 **AND** Fingerstick Glucose (POCT), , , 4x Daily AC and at bedtime, Sugey Wiseman PA-C  •  metoclopramide (REGLAN) injection 5 mg, 5 mg, Intravenous, Q6H PRN, Patel Duncan, DO, 5 mg at 10/23/22 5026  •  morphine injection 4 mg, 4 mg, Intravenous, Q4H PRN, Gerson Aguilar DO, 4 mg at 10/28/22 0522  •  ondansetron (ZOFRAN) injection 4 mg, 4 mg, Intravenous, Q6H PRN, Sugey Wiseman PA-C, 4 mg at 10/22/22 2241  •  oxyCODONE (ROXICODONE) IR tablet 5 mg, 5 mg, Oral, Q4H PRN, Gerson Aguilar DO, 5 mg at 10/28/22 0207  •  piperacillin-tazobactam (ZOSYN) 3 375 g in sodium chloride 0 9 % 100 mL IVPB, 3 375 g, Intravenous, Q6H, Sugey Wiseman PA-C, Stopped at 10/28/22 0553  •  potassium chloride (K-DUR,KLOR-CON) CR tablet 40 mEq, 40 mEq, Oral, BID, Amy Parry MD, 40 mEq at 10/28/22 0800  •  sertraline (ZOLOFT) tablet 100 mg, 100 mg, Oral, Daily, Sugey Wiseman PA-C, 100 mg at 10/28/22 0800    Laboratory Results:  Results from last 7 days   Lab Units 10/28/22  0455 10/27/22  0554 10/26/22  1125 10/26/22  0617 10/25/22  0524 10/24/22  0437 10/23/22  0706 10/23/22  0535 10/22/22  1420 10/22/22  0502 10/21/22  2108   WBC Thousand/uL 13 85*  --   --  14 19* 12 49* 12 61* 14 91*  --   --  13 57* 13 39*   HEMOGLOBIN g/dL 8 5*  --   --  10 5* 9 0* 9 0* 9 5*  --   --  10 1* 10 1*   HEMATOCRIT % 28 0*  --   --  32 9* 28 8* 28 9* 29 3*  --   --  30 7* 31 1*   PLATELETS Thousands/uL 359  --   --  370 329 332 338  --   --  334 351   SODIUM mmol/L 138 142 140  --  139 141  --  141 140 143 143   POTASSIUM mmol/L 4 7 3 1* 3 8  --  3 2* 2 8*  --  2 9* 2 8* 2 4* 2 4*   CHLORIDE mmol/L 104 107 105  --  105 105  --  104 102 103 102   CO2 mmol/L 27 25 27  --  25 28  --  27 30 32 31   BUN mg/dL 16 14 16  --  19 19  --  15 14 13 16   CREATININE mg/dL 1 20 1 13 1 17  --  1 43* 1 44*  --  1 27 1 26 1 09 1 10 CALCIUM mg/dL 8 9 8 7 8 6  --  8 9 8 7  --  8 0* 8 7 8 2* 8 6   MAGNESIUM mg/dL  --   --   --   --   --  2 0  --  1 9 2 1 1 5* 1 6       I have personally reviewed the blood work as stated above and in my note  I have personally reviewed internal Medicine, co-consultants and previous nephrology notes

## 2022-10-28 NOTE — PHYSICAL THERAPY NOTE
PHYSICAL THERAPY TREATMENT NOTE  NAME:  Ashwin Poole  DATE: 10/28/22    Length Of Stay: 6  Performed at least 2 patient identifiers during session: Name and ID bracelet    TREATMENT:      10/28/22 0938   PT Last Visit   PT Visit Date 10/28/22   Note Type   Note Type Treatment   Pain Assessment   Pain Assessment Tool 0-10   Pain Score 6   Pain Location/Orientation Orientation: Bilateral;Location: Foot   Pain Onset/Description Onset: Ongoing   Patient's Stated Pain Goal No pain   Hospital Pain Intervention(s) Repositioned;Medication (See MAR); Ambulation/increased activity; Emotional support   Restrictions/Precautions   Weight Bearing Precautions Per Order Yes   LLE Weight Bearing Per Order NWB   Other Precautions WBS; Fall Risk;Pain   General   Chart Reviewed Yes   Cognition   Overall Cognitive Status [facility]   Arousal/Participation Alert; Cooperative   Attention Within functional limits   Orientation Level Oriented X4   Memory Within functional limits   Following Commands Follows one step commands without difficulty   Comments pleasant and cooperative   Bed Mobility   Additional Comments pt recieved sitting at EOB   Transfers   Sit to Stand 3  Moderate assistance   Additional items Assist x 1;Bedrails; Increased time required;Verbal cues   Stand to Sit 4  Minimal assistance   Additional items Assist x 1; Increased time required;Verbal cues; Bedrails   Additional Comments increased assistance needed to maintain NWB during transfer, able to maintain NWB during standing   Balance   Static Sitting Good   Dynamic Sitting Fair +   Static Standing Fair -   Dynamic Standing Poor +   Endurance Deficit   Endurance Deficit Yes   Endurance Deficit Description fatigues easily   Activity Tolerance   Activity Tolerance Patient limited by fatigue;Patient limited by pain   Nurse Made Aware yes   Exercises   Quad Sets Sitting;20 reps;AROM; Bilateral   Heelslides Sitting;20 reps;AROM; Bilateral   Glute Sets Sitting;20 reps;AROM; Bilateral Hip Abduction Sitting;20 reps;AROM; Bilateral   Hip Adduction Sitting;20 reps;AROM; Bilateral   Knee AROM Long Arc Quad Sitting;20 reps;AROM; Bilateral   Ankle Pumps Sitting;20 reps;AROM; Bilateral   Marching Sitting;20 reps;AROM; Bilateral   Assessment   Prognosis Fair   Problem List Decreased strength;Decreased endurance; Impaired balance;Decreased mobility;Obesity; Decreased skin integrity;Pain   Assessment Pt seen for PT treatment session this date with interventions consisting of Therapeutic exercise consisting of: AROM 2 sets of 10 reps B LE in seated at EOB position and therapeutic activity consisting of training: sit<>stand transfers, static sitting tolerance at EOB for 30 minutes w/ min UE support, vc and tactile cues for static sitting posture faciliation and vc and tactile cues for static standing posture faciliation  Pt agreeable to PT treatment session upon arrival, pt found seated at EOB, in no apparent distress and responsive  In comparison to previous session, pt with improvements in functional activity tolerance  Post session: all needs in reach and RN notified of session findings/recommendations  Continue to recommend post acute rehabilitation services at time of d/c in order to maximize pt's functional independence and safety w/ mobility  Pt continues to be functioning below baseline level, and remains limited 2* factors listed above and including NWB status LLE  PT will continue to see pt during current hospitalization in order to address the deficits listed above and provide interventions consistent w/ POC in effort to achieve STGs  Barriers to Discharge Inaccessible home environment;Decreased caregiver support   Barriers to Discharge Comments lives alone, stairs   Goals   Patient Goals to get back to normal   STG Expiration Date 11/08/22   Plan   Treatment/Interventions Functional transfer training;LE strengthening/ROM; Therapeutic exercise; Endurance training;Bed mobility;Gait training;Spoke to nursing   Progress Slow progress, medical status limitations   PT Frequency 4-6x/wk   Recommendation   PT Discharge Recommendation Post acute rehabilitation services   AM-PAC Basic Mobility Inpatient   Turning in Bed Without Bedrails 3   Lying on Back to Sitting on Edge of Flat Bed 3   Moving Bed to Chair 2   Standing Up From Chair 3   Walk in Room 2   Climb 3-5 Stairs 1   Basic Mobility Inpatient Raw Score 14   Basic Mobility Standardized Score 35 55   Highest Level Of Mobility   -[facility] Goal 4: Move to chair/commode   -HL Achieved 5: Stand (1 or more minutes)   Education   Education Provided Mobility training;Assistive device; Other  (NWB status)   Patient Demonstrates acceptance/verbal understanding;Reinforcement needed   End of Consult   Patient Position at End of Consult Seated edge of bed; All needs within reach       The patient's AM-PAC Basic Mobility Inpatient Short Form Raw Score is 14  A Raw score of less than or equal to 16 suggests the patient may benefit from discharge to post-acute rehabilitation services  Please also refer to the recommendation of the Physical Therapist for safe discharge planning        Davi Levine

## 2022-10-28 NOTE — PLAN OF CARE
Problem: PHYSICAL THERAPY ADULT  Goal: Performs mobility at highest level of function for planned discharge setting  See evaluation for individualized goals  Description: Treatment/Interventions: Functional transfer training, LE strengthening/ROM, Therapeutic exercise, Endurance training, Patient/family training, Equipment eval/education, Bed mobility, Compensatory technique education, Gait training, Continued evaluation, Spoke to nursing, Spoke to MD, Spoke to case management, OT          See flowsheet documentation for full assessment, interventions and recommendations  Outcome: Progressing  Note: Prognosis: Fair  Problem List: Decreased strength, Decreased endurance, Impaired balance, Decreased mobility, Obesity, Decreased skin integrity, Pain  Assessment: Pt seen for PT treatment session this date with interventions consisting of Therapeutic exercise consisting of: AROM 2 sets of 10 reps B LE in seated at EOB position and therapeutic activity consisting of training: sit<>stand transfers, static sitting tolerance at EOB for 30 minutes w/ min UE support, vc and tactile cues for static sitting posture faciliation and vc and tactile cues for static standing posture faciliation  Pt agreeable to PT treatment session upon arrival, pt found seated at EOB, in no apparent distress and responsive  In comparison to previous session, pt with improvements in functional activity tolerance  Post session: all needs in reach and RN notified of session findings/recommendations  Continue to recommend post acute rehabilitation services at time of d/c in order to maximize pt's functional independence and safety w/ mobility  Pt continues to be functioning below baseline level, and remains limited 2* factors listed above and including NWB status LLE   PT will continue to see pt during current hospitalization in order to address the deficits listed above and provide interventions consistent w/ POC in effort to achieve STGs   Barriers to Discharge: Inaccessible home environment, Decreased caregiver support  Barriers to Discharge Comments: lives alone, stairs  PT Discharge Recommendation: Post acute rehabilitation services    See flowsheet documentation for full assessment

## 2022-10-28 NOTE — CONSULTS
Consultation - Infectious Disease   Sukhi Cardoza 65 y o  female MRN: [id number]  Unit/Bed#: E2 -01 Encounter: [id number]      IMPRESSION & RECOMMENDATIONS:   1  Sepsis-POA  Tachycardia and leukocytosis  Suspect was secondary to UTI  No overt evidence of a soft tissue infection involving the heel  The patient was not bacteremic  She has clinically improved and has completed a more than week course of intravenous antibiotics  -discontinue Zosyn  -monitor off all antibiotics  -recheck CBC with diff and BMP  -supportive care    2  E coli urinary tract infection-status post more than a week of intravenous antibiotics with resolution of her symptomatology  -no additional ID treatment or workup for now    3  Left heel eschar-without any purulent drainage or surrounding cellulitis to suggest an overt soft tissue infection  Consideration for the possibility of a deeper infection although MRI does not reveal any obvious osteomyelitis  -vascular workup underway  -check CT angiogram  -close podiatry follow-up  -serial exams  -local wound care    4  Diabetes mellitus-type 2 with hyperglycemia with hemoglobin A1c of 9 8    Have discussed the above management plan in detail with the primary service    Extensive review of the medical records in epic including review of the notes, radiographs, and laboratory results     HISTORY OF PRESENT ILLNESS:  Reason for Consult:  Left heel wound  HPI: Sukhi Cardoza is a 65y o  year old female with diabetes mellitus, morbid obesity, admitted [facility] on 10/22/2022 with a worsening left heel wound who we are asked to assist with antibiotic management  The patient apparently has had this heel ulceration for quite some time but is become more extensive  She had been having some fever at home over the last week associated with dysuria and hematuria as well as some nausea and vomiting    She had blood cultures obtained a urinalysis obtained which was consistent with UTI  On exam she was noted to have the left heel eschar but no surrounding cellulitis  The patient was started on Zosyn as main maintained on Zosyn over the last 8 days  She has not any recurrent fever although she has continued to have a bit of leukocytosis  She states that she did have some drainage previously from the heel when she was in her primary care physician's office however this seems to have resolved  She denies any current nausea vomiting or diarrhea, denies any cough or shortness of breath  REVIEW OF SYSTEMS:  A complete review of systems is negative other than that noted in the HPI  PAST MEDICAL HISTORY:  Past Medical History:   Diagnosis Date   • Anxiety    • Depression    • Diabetes ([email or web address] 75 )    • Diabetes mellitus ([email or web address] 75 )    • Esophageal reflux     2015 RESOLVED   • Hyperlipidemia    • Hypertension    • Low back pain     sciatica   • Pneumonia    • Stroke ([email or web address] 75 ) 2015    small vessel, L frontal corona radiata  Rx asa 81, Lipitor 40, risk modification   • Vitamin D deficiency      Past Surgical History:   Procedure Laterality Date   • COLONOSCOPY         FAMILY HISTORY:  Non-contributory    SOCIAL HISTORY:  Social History   Social History     Substance and Sexual Activity   Alcohol Use No    Comment: OCCASIONAL ALCOHOL USE IN ALL SCRIPTS     Social History     Substance and Sexual Activity   Drug Use No     Social History     Tobacco Use   Smoking Status Former Smoker   • Types: Cigarettes   • Quit date:    • Years since quittin 8   Smokeless Tobacco Never Used       ALLERGIES:  No Known Allergies    MEDICATIONS:  All current active medications have been reviewed    Antibiotics:  Zosyn 8    PHYSICAL EXAM:  Temp:  [96 7 °F (35 9 °C)-97 6 °F (36 4 °C)] 96 7 °F (35 9 °C)  HR:  [80-94] 80  Resp:  [18-20] 18  BP: (138-205)/(63-91) 138/70  SpO2:  [90 %-96 %] 90 %  Temp (24hrs), Av 1 °F (36 2 °C), Min:96 7 °F (35 9 °C), Max:97 6 °F (36 4 °C)  Current: Temperature: (!) 96 7 °F (35 9 °C)    Intake/Output Summary (Last 24 hours) at 10/28/2022 1038  Last data filed at 10/28/2022 0553  Gross per 24 hour   Intake 1760 ml   Output 1300 ml   Net 460 ml       General Appearance:  Appearing well, nontoxic, and in no distress   Head:  Normocephalic, without obvious abnormality, atraumatic   Eyes:  Conjunctiva pink and sclera anicteric, both eyes   Nose: Nares normal, mucosa normal, no drainage   Throat: Oropharynx moist without lesions   Neck: Supple, symmetrical, no adenopathy, no tenderness/mass/nodules   Back:   Symmetric, no curvature, ROM normal, no CVA tenderness   Lungs:   Clear to auscultation bilaterally, respirations unlabored   Chest Wall:  No tenderness or deformity   Heart:  RRR; no murmur, rub or gallop   Abdomen:   Soft, non-tender, non-distended, positive bowel sounds    Extremities: No cyanosis, clubbing or edema  Left foot heavily dressed with a dry dressing in place  Reviewed images in epic with notable large heel eschar but without any drainage or surrounding erythema   Skin: No rashes or lesions  No draining wounds noted  Lymph nodes: Cervical, supraclavicular nodes normal   Neurologic: Alert and oriented times 3, extremity strength 5/5 and symmetric       LABS, IMAGING, & OTHER STUDIES:  Lab Results:  I have personally reviewed pertinent labs    Results from last 7 days   Lab Units 10/28/22  0455 10/26/22  0617 10/25/22  0524   WBC Thousand/uL 13 85* 14 19* 12 49*   HEMOGLOBIN g/dL 8 5* 10 5* 9 0*   PLATELETS Thousands/uL 359 370 329     Results from last 7 days   Lab Units 10/28/22  0455 10/27/22  0554 10/26/22  1125 10/25/22  0524 10/24/22  0437 10/22/22  0502 10/21/22  2108   SODIUM mmol/L 138 142 140   < > 141   < > 143   POTASSIUM mmol/L 4 7 3 1* 3 8   < > 2 8*   < > 2 4*   CHLORIDE mmol/L 104 107 105   < > 105   < > 102   CO2 mmol/L 27 25 27   < > 28   < > 31   BUN mg/dL 16 14 16   < > 19   < > 16   CREATININE mg/dL 1 20 1 13 1 17   < > 1 44* < > 1 10   EGFR ml/min/1 73sq m 48 52 50   < > 39   < > 54   CALCIUM mg/dL 8 9 8 7 8 6   < > 8 7   < > 8 6   AST U/L  --   --   --   --  39  --  15   ALT U/L  --   --   --   --  26  --  15   ALK PHOS U/L  --   --   --   --  100  --  104    < > = values in this interval not displayed  Results from last 7 days   Lab Units 10/21/22  2128 10/21/22  2108 10/21/22  2107   BLOOD CULTURE   --  No Growth After 5 Days  No Growth After 5 Days     URINE CULTURE  >100,000 cfu/ml Escherichia coli*  --   --      Results from last 7 days   Lab Units 10/21/22  2108   PROCALCITONIN ng/ml 0 06         Results from last 7 days   Lab Units 10/25/22  0524   FERRITIN ng/mL 60           Imaging Studies:     MRI left ankle heel-large plantar/lateral heel ulcer with minimal marrow edema    Images personally reviewed by me in PACS

## 2022-10-28 NOTE — ASSESSMENT & PLAN NOTE
· Secondary to vomiting, improved with replacement   · Continue replacement and monitoring     Results from last 7 days   Lab Units 10/27/22  0554 10/26/22  1125 10/25/22  0524 10/24/22  0437 10/23/22  0535 10/22/22  1420 10/22/22  0502 10/21/22  2108   POTASSIUM mmol/L 3 1* 3 8 3 2* 2 8* 2 9* 2 8* 2 4* 2 4*

## 2022-10-29 LAB
ANION GAP SERPL CALCULATED.3IONS-SCNC: 9 MMOL/L (ref 4–13)
BASOPHILS # BLD AUTO: 0.04 THOUSANDS/ÂΜL (ref 0–0.1)
BASOPHILS NFR BLD AUTO: 1 % (ref 0–1)
BUN SERPL-MCNC: 17 MG/DL (ref 5–25)
CALCIUM SERPL-MCNC: 9.2 MG/DL (ref 8.3–10.1)
CHLORIDE SERPL-SCNC: 105 MMOL/L (ref 96–108)
CO2 SERPL-SCNC: 26 MMOL/L (ref 21–32)
CREAT SERPL-MCNC: 1.28 MG/DL (ref 0.6–1.3)
EOSINOPHIL # BLD AUTO: 0.32 THOUSAND/ÂΜL (ref 0–0.61)
EOSINOPHIL NFR BLD AUTO: 5 % (ref 0–6)
ERYTHROCYTE [DISTWIDTH] IN BLOOD BY AUTOMATED COUNT: 15.8 % (ref 11.6–15.1)
GFR SERPL CREATININE-BSD FRML MDRD: 45 ML/MIN/1.73SQ M
GLUCOSE SERPL-MCNC: 110 MG/DL (ref 65–140)
GLUCOSE SERPL-MCNC: 151 MG/DL (ref 65–140)
GLUCOSE SERPL-MCNC: 186 MG/DL (ref 65–140)
GLUCOSE SERPL-MCNC: 254 MG/DL (ref 65–140)
GLUCOSE SERPL-MCNC: 255 MG/DL (ref 65–140)
GLUCOSE SERPL-MCNC: 51 MG/DL (ref 65–140)
HCT VFR BLD AUTO: 48.8 % (ref 34.8–46.1)
HGB BLD-MCNC: 15.2 G/DL (ref 11.5–15.4)
IMM GRANULOCYTES # BLD AUTO: 0.06 THOUSAND/UL (ref 0–0.2)
IMM GRANULOCYTES NFR BLD AUTO: 1 % (ref 0–2)
LYMPHOCYTES # BLD AUTO: 0.64 THOUSANDS/ÂΜL (ref 0.6–4.47)
LYMPHOCYTES NFR BLD AUTO: 11 % (ref 14–44)
MCH RBC QN AUTO: 27.3 PG (ref 26.8–34.3)
MCHC RBC AUTO-ENTMCNC: 31.1 G/DL (ref 31.4–37.4)
MCV RBC AUTO: 88 FL (ref 82–98)
MONOCYTES # BLD AUTO: 0.48 THOUSAND/ÂΜL (ref 0.17–1.22)
MONOCYTES NFR BLD AUTO: 8 % (ref 4–12)
NEUTROPHILS # BLD AUTO: 4.41 THOUSANDS/ÂΜL (ref 1.85–7.62)
NEUTS SEG NFR BLD AUTO: 74 % (ref 43–75)
NRBC BLD AUTO-RTO: 0 /100 WBCS
PLATELET # BLD AUTO: 273 THOUSANDS/UL (ref 149–390)
PMV BLD AUTO: 8.7 FL (ref 8.9–12.7)
POTASSIUM SERPL-SCNC: 4.3 MMOL/L (ref 3.5–5.3)
RBC # BLD AUTO: 5.56 MILLION/UL (ref 3.81–5.12)
SODIUM SERPL-SCNC: 140 MMOL/L (ref 135–147)
WBC # BLD AUTO: 5.95 THOUSAND/UL (ref 4.31–10.16)

## 2022-10-29 PROCEDURE — 80048 BASIC METABOLIC PNL TOTAL CA: CPT | Performed by: FAMILY MEDICINE

## 2022-10-29 PROCEDURE — 82948 REAGENT STRIP/BLOOD GLUCOSE: CPT

## 2022-10-29 PROCEDURE — 94640 AIRWAY INHALATION TREATMENT: CPT

## 2022-10-29 PROCEDURE — 99232 SBSQ HOSP IP/OBS MODERATE 35: CPT | Performed by: INTERNAL MEDICINE

## 2022-10-29 PROCEDURE — 99233 SBSQ HOSP IP/OBS HIGH 50: CPT | Performed by: FAMILY MEDICINE

## 2022-10-29 PROCEDURE — 85025 COMPLETE CBC W/AUTO DIFF WBC: CPT | Performed by: FAMILY MEDICINE

## 2022-10-29 RX ORDER — ALBUTEROL SULFATE 90 UG/1
2 AEROSOL, METERED RESPIRATORY (INHALATION) EVERY 4 HOURS PRN
Status: DISCONTINUED | OUTPATIENT
Start: 2022-10-29 | End: 2022-11-10

## 2022-10-29 RX ADMIN — ATORVASTATIN CALCIUM 40 MG: 40 TABLET, FILM COATED ORAL at 15:56

## 2022-10-29 RX ADMIN — AMLODIPINE BESYLATE 10 MG: 10 TABLET ORAL at 09:40

## 2022-10-29 RX ADMIN — OXYCODONE HYDROCHLORIDE 5 MG: 5 TABLET ORAL at 18:04

## 2022-10-29 RX ADMIN — INSULIN GLARGINE 30 UNITS: 100 INJECTION, SOLUTION SUBCUTANEOUS at 21:03

## 2022-10-29 RX ADMIN — ALBUTEROL SULFATE 2.5 MG: 2.5 SOLUTION RESPIRATORY (INHALATION) at 00:58

## 2022-10-29 RX ADMIN — MORPHINE SULFATE 4 MG: 4 INJECTION INTRAVENOUS at 12:07

## 2022-10-29 RX ADMIN — INSULIN LISPRO 1 UNITS: 100 INJECTION, SOLUTION INTRAVENOUS; SUBCUTANEOUS at 11:39

## 2022-10-29 RX ADMIN — HEPARIN SODIUM 5000 UNITS: 5000 INJECTION INTRAVENOUS; SUBCUTANEOUS at 14:41

## 2022-10-29 RX ADMIN — HEPARIN SODIUM 5000 UNITS: 5000 INJECTION INTRAVENOUS; SUBCUTANEOUS at 21:03

## 2022-10-29 RX ADMIN — SERTRALINE HYDROCHLORIDE 100 MG: 100 TABLET ORAL at 09:08

## 2022-10-29 RX ADMIN — MORPHINE SULFATE 4 MG: 4 INJECTION INTRAVENOUS at 21:01

## 2022-10-29 RX ADMIN — ALBUTEROL SULFATE 2.5 MG: 2.5 SOLUTION RESPIRATORY (INHALATION) at 18:16

## 2022-10-29 RX ADMIN — HEPARIN SODIUM 5000 UNITS: 5000 INJECTION INTRAVENOUS; SUBCUTANEOUS at 05:33

## 2022-10-29 RX ADMIN — GABAPENTIN 200 MG: 100 CAPSULE ORAL at 09:05

## 2022-10-29 RX ADMIN — INSULIN LISPRO 3 UNITS: 100 INJECTION, SOLUTION INTRAVENOUS; SUBCUTANEOUS at 16:01

## 2022-10-29 RX ADMIN — CARVEDILOL 25 MG: 25 TABLET, FILM COATED ORAL at 15:56

## 2022-10-29 RX ADMIN — ASPIRIN 81 MG CHEWABLE TABLET 81 MG: 81 TABLET CHEWABLE at 09:04

## 2022-10-29 RX ADMIN — MORPHINE SULFATE 4 MG: 4 INJECTION INTRAVENOUS at 00:13

## 2022-10-29 RX ADMIN — GABAPENTIN 200 MG: 100 CAPSULE ORAL at 18:02

## 2022-10-29 RX ADMIN — INSULIN LISPRO 1 UNITS: 100 INJECTION, SOLUTION INTRAVENOUS; SUBCUTANEOUS at 21:04

## 2022-10-29 RX ADMIN — OXYCODONE HYDROCHLORIDE 5 MG: 5 TABLET ORAL at 09:02

## 2022-10-29 NOTE — PROGRESS NOTES
[address] [facility] NOTE   Tani Tom 65 y o  female MRN: [id number]  Unit/Bed#: E2 -01 Encounter: [id number]  Reason for Consult:  Acute kidney injury    ASSESSMENT and PLAN:  60-year-old female with a history of diabetes mellitus type 2, hypertension presenting with nonhealing left foot wound  Nephrology consult for acute kidney injury and need for CTA  Acute kidney injury:  · Etiology:  Likely multifactorial due to septic ATN/contrast all in the setting of autoregulatory failure due to ACE-inhibitor    · Baseline creatinine:  0 7-1 1 since 2020  · Peak creatinine 1 4  Creatinine improved to a cayden of 1 13   · Workup:    · UA:  Greater than 3+ protein, innumerable rbc's/wbc's/bacteria  · Imaging CT 02/2020 210 more simple renal cysts but no hydronephrosis  · Received IV fluids due to contrast exposure  · 10/28 required a bolus of Lasix for diuresis  · 10/29 slightly higher creatinine, 1 28 but feels much better volume wise  Currently not on oxygen  · Plan:  · No Lasix today but will continue to follow closely and dose as needed  · Continue to hold ACE-inhibitor  · Avoid hypotension, nephrotoxic agents    Hypokalemia:  Likely due to GI losses  Resolved  Blood pressure/volume status:  · Primary hypertension  · Blood pressure acceptable, ACE-inhibitor on hold  · Volume status:  Acceptable  At this time seems to be adequately diuresed will hold on a dose of Lasix today  Re-evaluate later this afternoon  · Medications:  Amlodipine 10 mg daily, Coreg 25 mg twice a day, as needed hydralazine    Anemia:  · Iron deficiency  No Venofer at this time due to foot infection  · Current hemoglobin 15 2  Yesterday hemoglobin 8 5 which was a 2 g decreased from prior level of 10 5  Likely error    Nonhealing left foot wound/PA D:    · Podiatry and vascular follow-up status post antibiotics  DISPOSITION:  No changes at this time  Check labs in the a m      SUBJECTIVE / 24H INTERVAL HISTORY:  No acute complaints  Feels better today after diuretic  Less puffy  Not on oxygen at this time  Always has lower extremity edema  OBJECTIVE:  Current Weight: Weight - Scale: 111 kg (245 lb 6 oz)  Vitals:    10/29/22 0341 10/29/22 0700 10/29/22 0858 10/29/22 0902   BP: 147/73 124/59  142/71   BP Location: Right arm Right arm  Right arm   Pulse: 82 82     Resp: 18 18     Temp: (!) 96 7 °F (35 9 °C) 98 6 °F (37 °C)     TempSrc: Temporal Oral     SpO2: 96% 96%     Weight:   111 kg (245 lb 6 oz)        Intake/Output Summary (Last 24 hours) at 10/29/2022 1020  Last data filed at 10/29/2022 0020  Gross per 24 hour   Intake --   Output 300 ml   Net -300 ml     General: NAD, sitting on the edge of the bed    Talking with family member  Skin: no rash, warm and dry  Eyes: anicteric sclera  ENT: moist mucous membrane  Neck: supple  Chest: CTA b/l, no ronchii, no wheeze, no rubs, no rales, normal effort  CVS: s1s2, no murmur, no gallop, no rub, normal rate regular rhythm  Abdomen: soft, nontender, nl sounds, protuberant but nondistended  Extremities: + edema LE b/l  : no pa  Neuro: AAOX3  Psych: normal affect  Medications:    Current Facility-Administered Medications:   •  acetaminophen (TYLENOL) tablet 650 mg, 650 mg, Oral, Q6H PRN, Sugey Wiseman PA-C, 650 mg at 10/23/22 1751  •  albuterol inhalation solution 2 5 mg, 2 5 mg, Nebulization, Q6H PRN, Amy Parry MD, 2 5 mg at 10/29/22 0058  •  ALPRAZolam (XANAX) tablet 0 25 mg, 0 25 mg, Oral, Daily PRN, Sugey Wiseman PA-C, 0 25 mg at 10/28/22 1120  •  amLODIPine (NORVASC) tablet 10 mg, 10 mg, Oral, Daily, ALEX Villegas, 10 mg at 10/29/22 0940  •  aspirin chewable tablet 81 mg, 81 mg, Oral, Daily, Sugey Wiseman PA-C, 81 mg at 10/29/22 2360  •  atorvastatin (LIPITOR) tablet 40 mg, 40 mg, Oral, Daily With Dinner, Catrina Kovacs MD, 40 mg at 10/28/22 1630  •  carvedilol (COREG) tablet 25 mg, 25 mg, Oral, BID With Meals, Beverley Villanueva ALEX French, 25 mg at 10/28/22 1630  •  gabapentin (NEURONTIN) capsule 200 mg, 200 mg, Oral, BID, Sugey Wiseman PA-C, 200 mg at 10/29/22 0042  •  heparin (porcine) subcutaneous injection 5,000 Units, 5,000 Units, Subcutaneous, Q8H [facility] & assisted, 5,000 Units at 10/29/22 0533 **AND** [CANCELED] Platelet count, , , Once, Sugey Wiseman PA-C  •  hydrALAZINE (APRESOLINE) injection 10 mg, 10 mg, Intravenous, Q6H PRN, Sugey Wiseman PA-C, 10 mg at 10/27/22 1857  •  insulin glargine (LANTUS) subcutaneous injection 30 Units 0 3 mL, 30 Units, Subcutaneous, HS, Amy Parry MD, 30 Units at 10/28/22 2208  •  insulin lispro (HumaLOG) 100 units/mL subcutaneous injection 1-6 Units, 1-6 Units, Subcutaneous, 4x Daily (AC & HS), 1 Units at 10/28/22 2208 **AND** Fingerstick Glucose (POCT), , , 4x Daily AC and at bedtime, Sugey Wiseman PA-C  •  metoclopramide (REGLAN) injection 5 mg, 5 mg, Intravenous, Q6H PRN, Patel Duncan DO, 5 mg at 10/23/22 8345  •  morphine injection 4 mg, 4 mg, Intravenous, Q4H PRN, Jen Prakash DO, 4 mg at 10/29/22 0013  •  ondansetron (ZOFRAN) injection 4 mg, 4 mg, Intravenous, Q6H PRN, Sugey Wiseman PA-C, 4 mg at 10/22/22 2241  •  oxyCODONE (ROXICODONE) IR tablet 5 mg, 5 mg, Oral, Q4H PRN, Jen Neavitt, DO, 5 mg at 10/29/22 0902  •  sertraline (ZOLOFT) tablet 100 mg, 100 mg, Oral, Daily, Sugey Wiseman PA-C, 100 mg at 10/29/22 7904    Laboratory Results:  Results from last 7 days   Lab Units 10/29/22  0537 10/28/22  0455 10/27/22  0554 10/26/22  1125 10/26/22  0617 10/25/22  0524 10/24/22  0437 10/23/22  0706 10/23/22  0535 10/22/22  1420   WBC Thousand/uL 5 95 13 85*  --   --  14 19* 12 49* 12 61* 14 91*  --   --    HEMOGLOBIN g/dL 15 2 8 5*  --   --  10 5* 9 0* 9 0* 9 5*  --   --    HEMATOCRIT % 48 8* 28 0*  --   --  32 9* 28 8* 28 9* 29 3*  --   --    PLATELETS Thousands/uL 273 359  --   --  370 329 332 338  --   --    POTASSIUM mmol/L 4 3 4 7 3 1* 3 8  --  3 2* 2 8*  -- 2  9* 2 8*   CHLORIDE mmol/L 105 104 107 105  --  105 105  --  104 102   CO2 mmol/L 26 27 25 27  --  25 28  --  27 30   BUN mg/dL 17 16 14 16  --  19 19  --  15 14   CREATININE mg/dL 1 28 1 20 1 13 1 17  --  1 43* 1 44*  --  1 27 1 26   CALCIUM mg/dL 9 2 8 9 8 7 8 6  --  8 9 8 7  --  8 0* 8 7   MAGNESIUM mg/dL  --   --   --   --   --   --  2 0  --  1 9 2 1

## 2022-10-29 NOTE — ASSESSMENT & PLAN NOTE
Iatrogenic fluid overload, patient received IV fluids in setting of NYDIA, contrast studies  Received Lasix 20 mg IV x2, will discuss with Nephrology regarding additional diuretics  Obtain 2D Echo

## 2022-10-29 NOTE — PLAN OF CARE
Problem: PAIN - ADULT  Goal: Verbalizes/displays adequate comfort level or baseline comfort level  Description: Interventions:  - Encourage patient to monitor pain and request assistance  - Assess pain using appropriate pain scale  - Administer analgesics based on type and severity of pain and evaluate response  - Implement non-pharmacological measures as appropriate and evaluate response  - Consider cultural and social influences on pain and pain management  - Notify physician/advanced practitioner if interventions unsuccessful or patient reports new pain  Outcome: Progressing     Problem: METABOLIC, FLUID AND ELECTROLYTES - ADULT  Goal: Electrolytes maintained within normal limits  Description: INTERVENTIONS:  - Monitor labs and assess patient for signs and symptoms of electrolyte imbalances  - Administer electrolyte replacement as ordered  - Monitor response to electrolyte replacements, including repeat lab results as appropriate  - Instruct patient on fluid and nutrition as appropriate  Outcome: Progressing  Goal: Fluid balance maintained  Description: INTERVENTIONS:  - Monitor labs   - Monitor I/O and WT  - Instruct patient on fluid and nutrition as appropriate  - Assess for signs & symptoms of volume excess or deficit  Outcome: Progressing  Goal: Glucose maintained within target range  Description: INTERVENTIONS:  - Monitor Blood Glucose as ordered  - Assess for signs and symptoms of hyperglycemia and hypoglycemia  - Administer ordered medications to maintain glucose within target range  - Assess nutritional intake and initiate nutrition service referral as needed  Outcome: Progressing     Problem: RESPIRATORY - ADULT  Goal: Achieves optimal ventilation and oxygenation  Description: INTERVENTIONS:  - Assess for changes in respiratory status  - Assess for changes in mentation and behavior  - Position to facilitate oxygenation and minimize respiratory effort  - Oxygen administered by appropriate delivery if ordered  - Initiate smoking cessation education as indicated  - Encourage broncho-pulmonary hygiene including cough, deep breathe, Incentive Spirometry  - Assess the need for suctioning and aspirate as needed  - Assess and instruct to report SOB or any respiratory difficulty  - Respiratory Therapy support as indicated  Outcome: Progressing     Problem: CARDIOVASCULAR - ADULT  Goal: Maintains optimal cardiac output and hemodynamic stability  Description: INTERVENTIONS:  - Monitor I/O, vital signs and rhythm  - Monitor for S/S and trends of decreased cardiac output  - Administer and titrate ordered vasoactive medications to optimize hemodynamic stability  - Assess quality of pulses, skin color and temperature  - Assess for signs of decreased coronary artery perfusion  - Instruct patient to report change in severity of symptoms  Outcome: Progressing     Problem: SKIN/TISSUE INTEGRITY - ADULT  Goal: Pressure injury heals and does not worsen  Description: Interventions:  - Implement low air loss mattress or specialty surface (Criteria met)  - Apply silicone foam dressing  - Instruct/assist with weight shifting every 15 minutes when in chair   - Limit chair time to 1 hour intervals  - Use special pressure reducing interventions such as waffle cushion when in chair   - Perform passive or active ROM   - Turn and reposition patient & offload bony prominences every 2 hours   - Utilize friction reducing device or surface for transfers   - Consider consults to  interdisciplinary teams such as wound care, PT/OT  - Use incontinent care products after each incontinent episode   - Consider nutrition services referral as needed  Outcome: Progressing     Problem: Potential for Falls  Goal: Patient will remain free of falls  Description: INTERVENTIONS:  - Educate patient/family on patient safety including physical limitations  - Instruct patient to call for assistance with activity   - Consult OT/PT to assist with strengthening/mobility   - Keep Call bell within reach  - Keep bed low and locked with side rails adjusted as appropriate  - Keep care items and personal belongings within reach  - Initiate and maintain comfort rounds  - Make Fall Risk Sign visible to staff  - Offer Toileting every 2 Hours, in advance of need  - Initiate/Maintain bed  chair alarm  - Obtain necessary fall risk management equipment: bed chair alarm, call bell, gripper sock, walker, bedside commode  - Apply yellow socks and bracelet for high fall risk patients  - Consider moving patient to room near nurses station  Outcome: Progressing

## 2022-10-29 NOTE — ASSESSMENT & PLAN NOTE
Present on admission with Leucocytosis and tachycardia and resolved  ID input appreciated  Sepsis suspected more likely due to UTI than left heel wound which is without purulence, osteomyelitis or cellulitis   Blood cultures no growth 4 days  S/p Zosyn therapy x7 days  Stable off Abx 24 hours with leukocytosis resolved

## 2022-10-29 NOTE — ASSESSMENT & PLAN NOTE
· In setting of uncontrolled diabetes   · Arterial duplex reviewed, "Great toe pressure of 35 mm Hg, below healing threshold for a diabetic"  · MRI of the foot noted for "a large plantar lateral heel ulcer, without underlying soft tissue abscess (series 5 image 5-15 ) Associated with this ulcer, there is only very minimal reactive marrow edema in the calcaneal tubercle evident on T2-weighted sequences (series 3 image 2, series 7 image 8 ) No confluent T1-weighted marrow abnormality or cortical destruction to suggest osteomyelitis "  · CTA of abdomen/pelvis done, report pending  · Received Zosyn x7 - ID consulted, appreciate input - wound is a stable eschar without purulence or cellulitis, no evidence of osteomyelitis - monitor off antibiotics

## 2022-10-29 NOTE — ASSESSMENT & PLAN NOTE
Lab Results   Component Value Date    HGBA1C 9 8 (H) 10/25/2022     Recent Labs     10/28/22  1223 10/28/22  1514 10/28/22  2123 10/29/22  0743   POCGLU 93 129 159* 110     · Uncontrolled with A1C at 9 8  · Apparently has been rationing glargine due to cost  · Blood glucose currently in acceptable range on 30 units daily  · Continue insulin sliding scale

## 2022-10-29 NOTE — PROGRESS NOTES
[address] [facility]  Progress Note - [address] [facility] [address] 1960, 65 y o  female MRN: [id number]  Unit/Bed#: E2 -01 Encounter: [id number]  Primary Care Provider: ALEX Wynne   Date and time admitted to hospital: 10/21/2022  7:58 PM    * Non healing left heel wound  Assessment & Plan  · In setting of uncontrolled diabetes   · Arterial duplex reviewed, "Great toe pressure of 35 mm Hg, below healing threshold for a diabetic"  · MRI of the foot noted for "a large plantar lateral heel ulcer, without underlying soft tissue abscess (series 5 image 5-15 ) Associated with this ulcer, there is only very minimal reactive marrow edema in the calcaneal tubercle evident on T2-weighted sequences (series 3 image 2, series 7 image 8 ) No confluent T1-weighted marrow abnormality or cortical destruction to suggest osteomyelitis "  · CTA of abdomen/pelvis done, report pending  · Received Zosyn x7 - ID consulted, appreciate input - wound is a stable eschar without purulence or cellulitis, no evidence of osteomyelitis - monitor off antibiotics     Sepsis ([address] )  Assessment & Plan  Present on admission with Leucocytosis and tachycardia and resolved  ID input appreciated  Sepsis suspected more likely due to UTI than left heel wound which is without purulence, osteomyelitis or cellulitis   Blood cultures no growth 4 days  S/p Zosyn therapy x7 days  Stable off Abx 24 hours with leukocytosis resolved     Generalized edema due to fluid overload  Assessment & Plan  Iatrogenic fluid overload, patient received IV fluids in setting of NYDIA, contrast studies  Received Lasix 20 mg IV x2, will discuss with Nephrology regarding additional diuretics  Obtain 2D Echo    NYDIA (acute kidney injury) ([address] )  Assessment & Plan  Resolved  Continues to be markedly fluid overloaded, will discuss Nephrology regarding additional doses of Lasix  Nephrology is following, appreciate input      PAD (peripheral artery disease) [facility])  Assessment & Plan  · Arterial Duplex reveals "Great toe pressure of 35 mm Hg, below healing threshold for a diabetic "  · CTA abd/pelvis:   · 1   Moderate stenosis of the mid infrarenal abdominal aorta secondary to calcific atherosclerotic disease  · 2   LEFT: Up to 50% stenosis of the proximal common iliac artery   The proximal and distal 3rd of the superficial femoral artery demonstrates 50-75% stenosis   Popliteal artery demonstrates greater than 75% stenosis in the P2 segment   Patent   three-vessel runoff extending into the left foot  · 3  RIGHT: Proximal common iliac artery stenosis of up to 50%   50% stenosis of the common femoral artery   Superficial femoral artery demonstrates up to 50% stenosis within the proximal several centimeters, 50-75% stenosis throughout the distal 3rd of   the vessel   Popliteal artery demonstrates scattered stenoses of 50-75%   Patent three-vessel runoff extending into the right foot  · Vascular surgery input appreciated  · Proceed with aspirin, statin therapy  · Arteriogram tentatively Monday, IR input appreciated - Plan for possible right ALLISON stent and left SFA/POP intervention  UTI (urinary tract infection)  Assessment & Plan  · E Coli UTI, sensitivities reviewed  Reports improvement in symptoms   Completed course of Zosyn     Wound of lower extremity  Assessment & Plan  MRI LLE without evidence of osteomyelitis   Abx have been discontinued     Type 2 diabetes mellitus with hyperglycemia, with long-term current use of insulin [facility])  Assessment & Plan  Lab Results   Component Value Date    HGBA1C 9 8 (H) 10/25/2022     Recent Labs     10/28/22  1223 10/28/22  1514 10/28/22  2123 10/29/22  0743   POCGLU 93 129 159* 110     · Uncontrolled with A1C at 9 8  · Apparently has been rationing glargine due to cost  · Blood glucose currently in acceptable range on 30 units daily  · Continue insulin sliding scale    Benign essential hypertension  Assessment & Plan  Controlled with resumption of home medications, continue current regimen    Depression, recurrent (HCC)  Assessment & Plan  · Controlled  · Continue sertraline, Xanax PRN      VTE Pharmacologic Prophylaxis: VTE Score: 4 Moderate Risk (Score 3-4) - Pharmacological DVT Prophylaxis Ordered: heparin  Patient Centered Rounds: I performed bedside rounds with nursing staff today  Discussions with Specialists or Other Care Team Provider: close monitoring, vascular procedures     Education and Discussions with Family / Patient: Patient   Time Spent for Care: 45 minutes  More than 50% of total time spent on counseling and coordination of care as described above  Current Length of Stay: 7 day(s)  Current Patient Status: Inpatient   Certification Statement: The patient will continue to require additional inpatient hospital stay due to vascular studies   Discharge Plan: Anticipate discharge in >72 hrs to rehab facility vs home with VNA    Code Status: Level 1 - Full Code    Subjective:   Patient seen and examined  No new complaints or overnight events  Objective:     Vitals:   Temp (24hrs), Av °F (36 1 °C), Min:96 3 °F (35 7 °C), Max:98 6 °F (37 °C)    Temp:  [96 3 °F (35 7 °C)-98 6 °F (37 °C)] 98 6 °F (37 °C)  HR:  [81-84] 82  Resp:  [18-20] 18  BP: (124-147)/(59-79) 142/71  SpO2:  [91 %-100 %] 96 %  Body mass index is 42 45 kg/m²  Input and Output Summary (last 24 hours): Intake/Output Summary (Last 24 hours) at 10/29/2022 0957  Last data filed at 10/29/2022 0020  Gross per 24 hour   Intake --   Output 300 ml   Net -300 ml       Physical Exam:   Physical Exam  Constitutional:       General: She is not in acute distress  Appearance: She is obese  HENT:      Head: Normocephalic and atraumatic  Nose: No congestion  Eyes:      Conjunctiva/sclera: Conjunctivae normal    Cardiovascular:      Rate and Rhythm: Normal rate  Heart sounds: No murmur heard    Pulmonary:      Effort: No respiratory distress  Breath sounds: No wheezing  Comments: Decreased breath sounds   Abdominal:      General: There is no distension  Tenderness: There is no abdominal tenderness  There is no guarding  Musculoskeletal:      Right lower leg: Edema present  Left lower leg: Edema present  Skin:     Comments: LLE dressing    Neurological:      Mental Status: She is oriented to person, place, and time  Mental status is at baseline  Psychiatric:         Mood and Affect: Mood normal           Additional Data:     Labs:  Results from last 7 days   Lab Units 10/29/22  0537   WBC Thousand/uL 5 95   HEMOGLOBIN g/dL 15 2   HEMATOCRIT % 48 8*   PLATELETS Thousands/uL 273   NEUTROS PCT % 74   LYMPHS PCT % 11*   MONOS PCT % 8   EOS PCT % 5     Results from last 7 days   Lab Units 10/29/22  0537 10/25/22  0524 10/24/22  0437   SODIUM mmol/L 140   < > 141   POTASSIUM mmol/L 4 3   < > 2 8*   CHLORIDE mmol/L 105   < > 105   CO2 mmol/L 26   < > 28   BUN mg/dL 17   < > 19   CREATININE mg/dL 1 28   < > 1 44*   ANION GAP mmol/L 9   < > 8   CALCIUM mg/dL 9 2   < > 8 7   ALBUMIN g/dL  --   --  2 2*   TOTAL BILIRUBIN mg/dL  --   --  0 24   ALK PHOS U/L  --   --  100   ALT U/L  --   --  26   AST U/L  --   --  39   GLUCOSE RANDOM mg/dL 51*   < > 86    < > = values in this interval not displayed           Results from last 7 days   Lab Units 10/29/22  0743 10/28/22  2123 10/28/22  1514 10/28/22  1223 10/28/22  0703 10/27/22  2117 10/27/22  1614 10/27/22  1132 10/27/22  0736 10/26/22  2056 10/26/22  1604 10/26/22  1134   POC GLUCOSE mg/dl 110 159* 129 93 78 99 137 155* 80 140 137 105     Results from last 7 days   Lab Units 10/25/22  0524   HEMOGLOBIN A1C % 9 8*           Lines/Drains:  Invasive Devices  Report    Peripheral Intravenous Line  Duration           Long-Dwell Peripheral IV (Midline) 10/27/22 Left Brachial 1 day                      Imaging: Reviewed radiology reports from this admission including: abdominal/pelvic CT and Personally reviewed the following imaging: abdominal/pelvic CT CTA abd/pelvis     Recent Cultures (last 7 days):         Last 24 Hours Medication List:   Current Facility-Administered Medications   Medication Dose Route Frequency Provider Last Rate   • acetaminophen  650 mg Oral Q6H PRN Sugey Wiseman PA-C     • albuterol  2 5 mg Nebulization Q6H PRN Amy Parry MD     • ALPRAZolam  0 25 mg Oral Daily PRN Sugey Wiseman PA-C     • amLODIPine  10 mg Oral Daily ALEX Simons     • aspirin  81 mg Oral Daily Sugey Wiseman PA-C     • atorvastatin  40 mg Oral Daily With Matilde Davenport MD     • carvedilol  25 mg Oral BID With Meals ALEX Simons     • gabapentin  200 mg Oral BID Sugey Wiseman PA-C     • heparin (porcine)  5,000 Units Subcutaneous Q8H [facility] Sugey Wiseman PA-C     • hydrALAZINE  10 mg Intravenous Q6H PRN Sugey Wiseman PA-C     • insulin glargine  30 Units Subcutaneous HS Diya Laureano MD     • insulin lispro  1-6 Units Subcutaneous 4x Daily (AC & HS) Sugey Wiseman PA-C     • metoclopramide  5 mg Intravenous Q6H PRN Rayshawn Montelongo, DO     • morphine injection  4 mg Intravenous Q4H PRN Patel Duncan DO     • ondansetron  4 mg Intravenous Q6H PRN Sugey Wiseman PA-C     • oxyCODONE  5 mg Oral Q4H PRN Rayshawn Montelongo DO     • sertraline  100 mg Oral Daily Sugey Soto PA-C          Today, Patient Was Seen By: Diya Laureano    **Please Note: This note may have been constructed using a voice recognition system  **

## 2022-10-29 NOTE — ASSESSMENT & PLAN NOTE
· Arterial Duplex reveals "Great toe pressure of 35 mm Hg, below healing threshold for a diabetic "  · CTA abd/pelvis:   · 1   Moderate stenosis of the mid infrarenal abdominal aorta secondary to calcific atherosclerotic disease  · 2   LEFT: Up to 50% stenosis of the proximal common iliac artery   The proximal and distal 3rd of the superficial femoral artery demonstrates 50-75% stenosis   Popliteal artery demonstrates greater than 75% stenosis in the P2 segment   Patent   three-vessel runoff extending into the left foot  · 3  RIGHT: Proximal common iliac artery stenosis of up to 50%   50% stenosis of the common femoral artery   Superficial femoral artery demonstrates up to 50% stenosis within the proximal several centimeters, 50-75% stenosis throughout the distal 3rd of   the vessel   Popliteal artery demonstrates scattered stenoses of 50-75%   Patent three-vessel runoff extending into the right foot  · Vascular surgery input appreciated  · Proceed with aspirin, statin therapy  · Arteriogram tentatively Monday, IR input appreciated - Plan for possible right ALLISON stent and left SFA/POP intervention

## 2022-10-29 NOTE — PROGRESS NOTES
Progress Note - Infectious Disease   Luda Ulloa 65 y o  female MRN: [id number]  Unit/Bed#: E2 -01 Encounter: [id number]      Impression/Plan:  1  Sepsis-POA  Tachycardia and leukocytosis  Suspect was secondary to UTI  No overt evidence of a soft tissue infection involving the heel  The patient was not bacteremic  She has clinically improved and has completed a more than week course of intravenous antibiotics  Patient is now stable off all antibiotics in the white cell count is normal  -monitor off all antibiotics  -recheck CBC with diff and BMP  -supportive care     2  E coli urinary tract infection-status post more than a week of intravenous antibiotics with resolution of her symptomatology  -no additional ID treatment or workup for now     3  Left heel eschar-without any purulent drainage or surrounding cellulitis to suggest an overt soft tissue infection  Consideration for the possibility of a deeper infection although MRI does not reveal any obvious osteomyelitis  CT angiogram with potentially significant PD  -vascular workup underway  -await angiogram with possible intervention  -close podiatry follow-up  -serial exams  -local wound care     4  Diabetes mellitus-type 2 with hyperglycemia with hemoglobin A1c of 9 8    Have discussed the above management plan with the Podiatry service    Antibiotics:  None status post 8 days of Zosyn    Subjective:  Patient has no fever, chills, sweats; no nausea, vomiting, diarrhea; no cough, shortness of breath; no increased pain  No new symptoms  Objective:  Vitals:  Temp:  [96 3 °F (35 7 °C)-98 6 °F (37 °C)] 98 6 °F (37 °C)  HR:  [81-84] 82  Resp:  [18-20] 18  BP: (124-147)/(59-79) 124/59  SpO2:  [91 %-100 %] 96 %  Temp (24hrs), Av °F (36 1 °C), Min:96 3 °F (35 7 °C), Max:98 6 °F (37 °C)  Current: Temperature: 98 6 °F (37 °C)    Physical Exam:   General Appearance:  Alert, interactive, nontoxic, no acute distress     Throat: Oropharynx moist without lesions  Lungs:   Clear to auscultation bilaterally; no wheezes, rhonchi or rales; respirations unlabored   Heart:  RRR; no murmur, rub or gallop   Abdomen:   Soft, non-tender, non-distended, positive bowel sounds  Extremities: No clubbing, cyanosis  Left foot dressed with a dry dressing in place  Skin: No new rashes or lesions  No draining wounds noted  Labs, Imaging, & Other studies:   All pertinent labs and imaging studies were personally reviewed  Results from last 7 days   Lab Units 10/29/22  0537 10/28/22  0455 10/26/22  0617   WBC Thousand/uL 5 95 13 85* 14 19*   HEMOGLOBIN g/dL 15 2 8 5* 10 5*   PLATELETS Thousands/uL 273 359 370     Results from last 7 days   Lab Units 10/29/22  0537 10/28/22  0455 10/27/22  0554 10/25/22  0524 10/24/22  0437   SODIUM mmol/L 140 138 142   < > 141   POTASSIUM mmol/L 4 3 4 7 3 1*   < > 2 8*   CHLORIDE mmol/L 105 104 107   < > 105   CO2 mmol/L 26 27 25   < > 28   BUN mg/dL 17 16 14   < > 19   CREATININE mg/dL 1 28 1 20 1 13   < > 1 44*   EGFR ml/min/1 73sq m 45 48 52   < > 39   CALCIUM mg/dL 9 2 8 9 8 7   < > 8 7   AST U/L  --   --   --   --  39   ALT U/L  --   --   --   --  26   ALK PHOS U/L  --   --   --   --  100    < > = values in this interval not displayed  Results from last 7 days   Lab Units 10/25/22  0524   FERRITIN ng/mL 60         CT abdomen with angiogram-stenosis mid infrarenal abdominal aorta  More distal stenotic disease noted bilaterally    No pathologic intra-abdominal processes    Images personally reviewed by me in PACS

## 2022-10-29 NOTE — ASSESSMENT & PLAN NOTE
Resolved  Continues to be markedly fluid overloaded, will discuss Nephrology regarding additional doses of Lasix  Nephrology is following, appreciate input

## 2022-10-30 ENCOUNTER — APPOINTMENT (INPATIENT)
Dept: NON INVASIVE DIAGNOSTICS | Facility: HOSPITAL | Age: 62
DRG: 853 | End: 2022-10-30
Payer: COMMERCIAL

## 2022-10-30 PROBLEM — D64.9 ACUTE ON CHRONIC ANEMIA: Status: ACTIVE | Noted: 2022-10-30

## 2022-10-30 LAB
ANION GAP SERPL CALCULATED.3IONS-SCNC: 6 MMOL/L (ref 4–13)
AORTIC ROOT: 3.2 CM
APICAL FOUR CHAMBER EJECTION FRACTION: 53 %
ASCENDING AORTA: 3.5 CM
AV LVOT PEAK GRADIENT: 5 MMHG
AV PEAK GRADIENT: 12 MMHG
BASOPHILS # BLD AUTO: 0.07 THOUSANDS/ÂΜL (ref 0–0.1)
BASOPHILS NFR BLD AUTO: 1 % (ref 0–1)
BUN SERPL-MCNC: 20 MG/DL (ref 5–25)
CALCIUM SERPL-MCNC: 9.2 MG/DL (ref 8.3–10.1)
CHLORIDE SERPL-SCNC: 103 MMOL/L (ref 96–108)
CO2 SERPL-SCNC: 28 MMOL/L (ref 21–32)
CREAT SERPL-MCNC: 1.39 MG/DL (ref 0.6–1.3)
E WAVE DECELERATION TIME: 160 MS
EOSINOPHIL # BLD AUTO: 0.53 THOUSAND/ÂΜL (ref 0–0.61)
EOSINOPHIL NFR BLD AUTO: 4 % (ref 0–6)
ERYTHROCYTE [DISTWIDTH] IN BLOOD BY AUTOMATED COUNT: 15 % (ref 11.6–15.1)
FRACTIONAL SHORTENING: 28 (ref 28–44)
GFR SERPL CREATININE-BSD FRML MDRD: 40 ML/MIN/1.73SQ M
GLUCOSE SERPL-MCNC: 110 MG/DL (ref 65–140)
GLUCOSE SERPL-MCNC: 117 MG/DL (ref 65–140)
GLUCOSE SERPL-MCNC: 123 MG/DL (ref 65–140)
GLUCOSE SERPL-MCNC: 127 MG/DL (ref 65–140)
GLUCOSE SERPL-MCNC: 157 MG/DL (ref 65–140)
GLUCOSE SERPL-MCNC: 164 MG/DL (ref 65–140)
HCT VFR BLD AUTO: 25.5 % (ref 34.8–46.1)
HEMOCCULT STL QL: NEGATIVE
HGB BLD-MCNC: 7.8 G/DL (ref 11.5–15.4)
IMM GRANULOCYTES # BLD AUTO: 0.14 THOUSAND/UL (ref 0–0.2)
IMM GRANULOCYTES NFR BLD AUTO: 1 % (ref 0–2)
INTERVENTRICULAR SEPTUM IN DIASTOLE (PARASTERNAL SHORT AXIS VIEW): 1.4 CM
INTERVENTRICULAR SEPTUM: 1.4 CM (ref 0.6–1.1)
LAAS-AP2: 15.6 CM2
LAAS-AP4: 15.6 CM2
LEFT ATRIUM SIZE: 3.6 CM
LEFT INTERNAL DIMENSION IN SYSTOLE: 2.8 CM (ref 2.1–4)
LEFT VENTRICULAR INTERNAL DIMENSION IN DIASTOLE: 3.9 CM (ref 3.5–6)
LEFT VENTRICULAR POSTERIOR WALL IN END DIASTOLE: 1.3 CM
LEFT VENTRICULAR STROKE VOLUME: 34 ML
LVSV (TEICH): 34 ML
LYMPHOCYTES # BLD AUTO: 1.84 THOUSANDS/ÂΜL (ref 0.6–4.47)
LYMPHOCYTES NFR BLD AUTO: 14 % (ref 14–44)
MCH RBC QN AUTO: 27.4 PG (ref 26.8–34.3)
MCHC RBC AUTO-ENTMCNC: 30.6 G/DL (ref 31.4–37.4)
MCV RBC AUTO: 90 FL (ref 82–98)
MONOCYTES # BLD AUTO: 0.9 THOUSAND/ÂΜL (ref 0.17–1.22)
MONOCYTES NFR BLD AUTO: 7 % (ref 4–12)
MV E'TISSUE VEL-SEP: 11 CM/S
MV PEAK A VEL: 1.03 M/S
MV PEAK E VEL: 136 CM/S
MV STENOSIS PRESSURE HALF TIME: 46 MS
MV VALVE AREA P 1/2 METHOD: 4.78
NEUTROPHILS # BLD AUTO: 9.42 THOUSANDS/ÂΜL (ref 1.85–7.62)
NEUTS SEG NFR BLD AUTO: 73 % (ref 43–75)
NRBC BLD AUTO-RTO: 0 /100 WBCS
PLATELET # BLD AUTO: 368 THOUSANDS/UL (ref 149–390)
PMV BLD AUTO: 8.8 FL (ref 8.9–12.7)
POTASSIUM SERPL-SCNC: 4.5 MMOL/L (ref 3.5–5.3)
RBC # BLD AUTO: 2.85 MILLION/UL (ref 3.81–5.12)
RIGHT ATRIAL 2D VOLUME: 34 ML
RIGHT ATRIUM AREA SYSTOLE A4C: 14.6 CM2
RIGHT VENTRICLE ID DIMENSION: 3.5 CM
SL CV LEFT ATRIUM LENGTH A2C: 5.5 CM
SL CV LV EF: 60
SL CV PED ECHO LEFT VENTRICLE DIASTOLIC VOLUME (MOD BIPLANE) 2D: 64 ML
SL CV PED ECHO LEFT VENTRICLE SYSTOLIC VOLUME (MOD BIPLANE) 2D: 30 ML
SODIUM SERPL-SCNC: 137 MMOL/L (ref 135–147)
TR MAX PG: 28 MMHG
TR PEAK VELOCITY: 2.7 M/S
TRICUSPID VALVE PEAK REGURGITATION VELOCITY: 2.66 M/S
WBC # BLD AUTO: 12.9 THOUSAND/UL (ref 4.31–10.16)

## 2022-10-30 PROCEDURE — 93306 TTE W/DOPPLER COMPLETE: CPT

## 2022-10-30 PROCEDURE — 93306 TTE W/DOPPLER COMPLETE: CPT | Performed by: INTERNAL MEDICINE

## 2022-10-30 PROCEDURE — 99232 SBSQ HOSP IP/OBS MODERATE 35: CPT | Performed by: INTERNAL MEDICINE

## 2022-10-30 PROCEDURE — 82272 OCCULT BLD FECES 1-3 TESTS: CPT | Performed by: FAMILY MEDICINE

## 2022-10-30 PROCEDURE — NC001 PR NO CHARGE: Performed by: SURGERY

## 2022-10-30 PROCEDURE — 85025 COMPLETE CBC W/AUTO DIFF WBC: CPT | Performed by: FAMILY MEDICINE

## 2022-10-30 PROCEDURE — 82948 REAGENT STRIP/BLOOD GLUCOSE: CPT

## 2022-10-30 PROCEDURE — 80048 BASIC METABOLIC PNL TOTAL CA: CPT | Performed by: FAMILY MEDICINE

## 2022-10-30 PROCEDURE — 99232 SBSQ HOSP IP/OBS MODERATE 35: CPT | Performed by: FAMILY MEDICINE

## 2022-10-30 RX ORDER — FUROSEMIDE 10 MG/ML
20 INJECTION INTRAMUSCULAR; INTRAVENOUS ONCE
Status: COMPLETED | OUTPATIENT
Start: 2022-10-30 | End: 2022-10-30

## 2022-10-30 RX ORDER — OXYCODONE HYDROCHLORIDE 10 MG/1
10 TABLET ORAL EVERY 4 HOURS PRN
Status: DISCONTINUED | OUTPATIENT
Start: 2022-10-30 | End: 2022-11-10

## 2022-10-30 RX ORDER — OXYCODONE HYDROCHLORIDE 5 MG/1
5 TABLET ORAL EVERY 4 HOURS PRN
Status: DISCONTINUED | OUTPATIENT
Start: 2022-10-30 | End: 2022-11-10

## 2022-10-30 RX ORDER — LORAZEPAM 0.5 MG/1
0.5 TABLET ORAL EVERY 8 HOURS PRN
Status: DISCONTINUED | OUTPATIENT
Start: 2022-10-30 | End: 2022-11-14

## 2022-10-30 RX ORDER — SODIUM CHLORIDE 9 MG/ML
50 INJECTION, SOLUTION INTRAVENOUS CONTINUOUS
Status: DISCONTINUED | OUTPATIENT
Start: 2022-10-31 | End: 2022-10-31

## 2022-10-30 RX ADMIN — HEPARIN SODIUM 5000 UNITS: 5000 INJECTION INTRAVENOUS; SUBCUTANEOUS at 22:36

## 2022-10-30 RX ADMIN — GABAPENTIN 200 MG: 100 CAPSULE ORAL at 17:37

## 2022-10-30 RX ADMIN — HEPARIN SODIUM 5000 UNITS: 5000 INJECTION INTRAVENOUS; SUBCUTANEOUS at 05:06

## 2022-10-30 RX ADMIN — SERTRALINE HYDROCHLORIDE 100 MG: 100 TABLET ORAL at 08:41

## 2022-10-30 RX ADMIN — AMLODIPINE BESYLATE 10 MG: 10 TABLET ORAL at 08:42

## 2022-10-30 RX ADMIN — INSULIN LISPRO 1 UNITS: 100 INJECTION, SOLUTION INTRAVENOUS; SUBCUTANEOUS at 16:18

## 2022-10-30 RX ADMIN — ASPIRIN 81 MG CHEWABLE TABLET 81 MG: 81 TABLET CHEWABLE at 08:42

## 2022-10-30 RX ADMIN — ALBUTEROL SULFATE 2 PUFF: 90 AEROSOL, METERED RESPIRATORY (INHALATION) at 06:11

## 2022-10-30 RX ADMIN — OXYCODONE HYDROCHLORIDE 5 MG: 5 TABLET ORAL at 10:58

## 2022-10-30 RX ADMIN — OXYCODONE HYDROCHLORIDE 5 MG: 5 TABLET ORAL at 05:46

## 2022-10-30 RX ADMIN — HEPARIN SODIUM 5000 UNITS: 5000 INJECTION INTRAVENOUS; SUBCUTANEOUS at 13:21

## 2022-10-30 RX ADMIN — OXYCODONE 5 MG: 5 TABLET ORAL at 23:40

## 2022-10-30 RX ADMIN — CARVEDILOL 25 MG: 25 TABLET, FILM COATED ORAL at 16:18

## 2022-10-30 RX ADMIN — INSULIN GLARGINE 30 UNITS: 100 INJECTION, SOLUTION SUBCUTANEOUS at 22:33

## 2022-10-30 RX ADMIN — ATORVASTATIN CALCIUM 40 MG: 40 TABLET, FILM COATED ORAL at 16:18

## 2022-10-30 RX ADMIN — OXYCODONE 5 MG: 5 TABLET ORAL at 17:37

## 2022-10-30 RX ADMIN — MORPHINE SULFATE 4 MG: 4 INJECTION INTRAVENOUS at 07:52

## 2022-10-30 RX ADMIN — CARVEDILOL 25 MG: 25 TABLET, FILM COATED ORAL at 07:30

## 2022-10-30 RX ADMIN — INSULIN LISPRO 1 UNITS: 100 INJECTION, SOLUTION INTRAVENOUS; SUBCUTANEOUS at 22:34

## 2022-10-30 RX ADMIN — FUROSEMIDE 20 MG: 10 INJECTION, SOLUTION INTRAMUSCULAR; INTRAVENOUS at 10:58

## 2022-10-30 RX ADMIN — GABAPENTIN 200 MG: 100 CAPSULE ORAL at 08:42

## 2022-10-30 NOTE — PROGRESS NOTES
Progress Note - Vascular Surgery  : JAELYN Vascular Surgery Resident on Silvino Varghese 65 y o  female MRN: [id number]  Unit/Bed#: E2 -01 Encounter: [id number]    Assessment:  65 y o  female with LLE > RLE DFU       Plan:  LLE agram today to optimize healing pressures  Monitor Cr  Appreciate nephrology recs regarding SANTOS mitigation  Appreciated ID & podiatry team recommendations    Subjective/Objective     Subjective: No acute complaints      Objective:     Pulse exam:  BL fems palp    Physical Exam:  GEN: NAD  HEENT: MMM  CV: RRR  Lung: Normal effort  Ab: Soft, ND/NT   Neuro: A+Ox3         Vitals:  BP (!) 180/70 (BP Location: Right arm)   Pulse 91   Temp 98 5 °F (36 9 °C) (Tympanic)   Resp 18   Wt 113 kg (250 lb)   SpO2 93%   BMI 43 25 kg/m²     I/Os:  I/O last 3 completed shifts:  In: -   Out: 950 [Urine:950]  No intake/output data recorded  Lab Results and Cultures:   Lab Results   Component Value Date    WBC 12 90 (H) 10/30/2022    HGB 7 8 (L) 10/30/2022    HCT 25 5 (L) 10/30/2022    MCV 90 10/30/2022     10/30/2022     Lab Results   Component Value Date    CALCIUM 9 2 10/30/2022    K 4 5 10/30/2022    CO2 28 10/30/2022     10/30/2022    BUN 20 10/30/2022    CREATININE 1 39 (H) 10/30/2022     Lab Results   Component Value Date    INR 1 00 10/21/2022    INR 1 33 (H) 02/02/2020    INR 1 37 (H) 01/31/2020    PROTIME 13 2 10/21/2022    PROTIME 16 7 (H) 02/02/2020    PROTIME 17 1 (H) 01/31/2020        Blood Culture:   Lab Results   Component Value Date    BLOODCX No Growth After 5 Days   10/21/2022   ,   Urinalysis:   Lab Results   Component Value Date    COLORU Light Yellow 10/21/2022    CLARITYU Turbid 10/21/2022    SPECGRAV >=1 030 10/21/2022    PHUR 6 0 10/21/2022    PHUR 6 5 02/23/2018    LEUKOCYTESUR Negative 10/21/2022    NITRITE Negative 10/21/2022    GLUCOSEU 250 (1/4%) (A) 10/21/2022    KETONESU Negative 10/21/2022    BILIRUBINUR Negative 10/21/2022 BLOODU Large (A) 10/21/2022   ,   Urine Culture:   Lab Results   Component Value Date    URINECX >100,000 cfu/ml Escherichia coli (A) 10/21/2022   ,   Wound Culure: No results found for: WOUNDCULT    Medications:  Current Facility-Administered Medications   Medication Dose Route Frequency   • acetaminophen (TYLENOL) tablet 650 mg  650 mg Oral Q6H PRN   • albuterol (PROVENTIL HFA,VENTOLIN HFA) inhaler 2 puff  2 puff Inhalation Q4H PRN   • ALPRAZolam (XANAX) tablet 0 25 mg  0 25 mg Oral Daily PRN   • amLODIPine (NORVASC) tablet 10 mg  10 mg Oral Daily   • aspirin chewable tablet 81 mg  81 mg Oral Daily   • atorvastatin (LIPITOR) tablet 40 mg  40 mg Oral Daily With Dinner   • carvedilol (COREG) tablet 25 mg  25 mg Oral BID With Meals   • gabapentin (NEURONTIN) capsule 200 mg  200 mg Oral BID   • heparin (porcine) subcutaneous injection 5,000 Units  5,000 Units Subcutaneous Q8H [facility]   • hydrALAZINE (APRESOLINE) injection 10 mg  10 mg Intravenous Q6H PRN   • insulin glargine (LANTUS) subcutaneous injection 30 Units 0 3 mL  30 Units Subcutaneous HS   • insulin lispro (HumaLOG) 100 units/mL subcutaneous injection 1-6 Units  1-6 Units Subcutaneous 4x Daily (AC & HS)   • metoclopramide (REGLAN) injection 5 mg  5 mg Intravenous Q6H PRN   • morphine injection 4 mg  4 mg Intravenous Q4H PRN   • ondansetron (ZOFRAN) injection 4 mg  4 mg Intravenous Q6H PRN   • oxyCODONE (ROXICODONE) IR tablet 5 mg  5 mg Oral Q4H PRN   • sertraline (ZOLOFT) tablet 100 mg  100 mg Oral Daily         Cherry [place]ro[place]  10/30/2022

## 2022-10-30 NOTE — ASSESSMENT & PLAN NOTE
Initially resolved with Cr again trending up today at 1 3  Continues to be markedly fluid overloaded, discussed with Nephrology, will receive Lasix today  Appreciate Nephrology input

## 2022-10-30 NOTE — PROGRESS NOTES
[address] [facility]  [facility] Note [facility] [facility] [address] 1960, 65 y o  female MRN: [id number]  Unit/Bed#: E2 -01 Encounter: [id number]  Primary Care Provider: ALEX Morocho   Date and time admitted to hospital: 10/21/2022  7:58 PM    * Non healing left heel wound  Assessment & Plan  · In setting of uncontrolled diabetes   · Arterial duplex reviewed, "Great toe pressure of 35 mm Hg, below healing threshold for a diabetic"  · MRI of the foot noted for "a large plantar lateral heel ulcer, without underlying soft tissue abscess (series 5 image 5-15 ) Associated with this ulcer, there is only very minimal reactive marrow edema in the calcaneal tubercle evident on T2-weighted sequences (series 3 image 2, series 7 image 8 ) No confluent T1-weighted marrow abnormality or cortical destruction to suggest osteomyelitis "  · CTA of abdomen/pelvis done, report pending  · Received Zosyn x7 - ID consulted, appreciate input - wound is a stable eschar without purulence or cellulitis, no evidence of osteomyelitis - monitor off antibiotics     PAD (peripheral artery disease) ([facility])  Assessment & Plan  · Arterial Duplex reveals "Great toe pressure of 35 mm Hg, below healing threshold for a diabetic "  · CTA abd/pelvis:   · 1   Moderate stenosis of the mid infrarenal abdominal aorta secondary to calcific atherosclerotic disease  · 2   LEFT: Up to 50% stenosis of the proximal common iliac artery   The proximal and distal 3rd of the superficial femoral artery demonstrates 50-75% stenosis   Popliteal artery demonstrates greater than 75% stenosis in the P2 segment   Patent   three-vessel runoff extending into the left foot     · 3  RIGHT: Proximal common iliac artery stenosis of up to 50%   50% stenosis of the common femoral artery   Superficial femoral artery demonstrates up to 50% stenosis within the proximal several centimeters, 50-75% stenosis throughout the distal 3rd of   the vessel   Popliteal artery demonstrates scattered stenoses of 50-75%   Patent three-vessel runoff extending into the right foot  · Vascular surgery input appreciated  · Proceed with aspirin, statin therapy  · Arteriogram tentatively Monday, IR input appreciated - Plan for possible right ALLISON stent and left SFA/POP intervention  Sepsis [facility])  Assessment & Plan  Present on admission with Leucocytosis and tachycardia and resolved  ID input appreciated  Sepsis suspected more likely due to UTI than left heel wound which is without purulence, osteomyelitis or cellulitis   Blood cultures no growth 4 days  S/p Zosyn therapy x7 days  Stable off Abx 48 hours with leukocytosis resolved     Acute on chronic anemia  Assessment & Plan  Hemoglobin fluctuating with baseline around 10  Today at 7 8 possibly dilutional with evidence of fluid overload  No evidence of bleeding  Will obtain Hemoccult stool as a precaution  Monitor CBC    Generalized edema due to fluid overload  Assessment & Plan  Iatrogenic fluid overload, patient received IV fluids in setting of NYDIA, contrast studies  Received pulse doses of Lasix 20 mg IV, plan for another dose today  2D Echo ordered     NYDIA (acute kidney injury) ([facility] [address] )  Assessment & Plan  Initially resolved with Cr again trending up today at 1 3  Continues to be markedly fluid overloaded, discussed with Nephrology, will receive Lasix today  Appreciate Nephrology input      UTI (urinary tract infection)  Assessment & Plan  · E Coli UTI, sensitivities reviewed  Reports improvement in symptoms   Completed course of Zosyn     Wound of lower extremity  Assessment & Plan  MRI LLE without evidence of osteomyelitis   Abx have been discontinued     Type 2 diabetes mellitus with hyperglycemia, with long-term current use of insulin [facility])  Assessment & Plan  Lab Results   Component Value Date    HGBA1C 9 8 (H) 10/25/2022     Recent Labs     10/29/22  2045 10/30/22  0647 10/30/22  1045 10/30/22  1048   POCGLU 186* 123 127 117 · Uncontrolled with A1C at 9 8  · Apparently has been rationing glargine due to cost  · Blood glucose currently in appropriate range on 30 units daily  · Continue insulin sliding scale    Benign essential hypertension  Assessment & Plan  Controlled with resumption of home medications, continue current regimen      VTE Pharmacologic Prophylaxis: VTE Score: 4 Moderate Risk (Score 3-4) - Pharmacological DVT Prophylaxis Ordered: heparin  Patient Centered Rounds: I performed bedside rounds with nursing staff today  Discussions with Specialists or Other Care Team Provider:  Nephrology    Education and Discussions with Family / Patient: Will call daughter  Time Spent for Care: 30 minutes  More than 50% of total time spent on counseling and coordination of care as described above  Current Length of Stay: 8 day(s)  Current Patient Status: Inpatient   Certification Statement: The patient will continue to require additional inpatient hospital stay due to Need for close monitoring, vascular procedures  Discharge Plan: Anticipate discharge in 48-72 hrs to rehab facility versus home with VNA    Code Status: Level 1 - Full Code    Subjective:   Patient seen and examined  She does complain of generalized edema especially in her legs today  Denies shortness of breath  Objective:     Vitals:   Temp (24hrs), Av 3 °F (36 8 °C), Min:98 1 °F (36 7 °C), Max:98 5 °F (36 9 °C)    Temp:  [98 1 °F (36 7 °C)-98 5 °F (36 9 °C)] 98 5 °F (36 9 °C)  HR:  [78-91] 91  Resp:  [18-20] 18  BP: (136-180)/(68-81) 136/68  SpO2:  [93 %-94 %] 93 %  Body mass index is 43 25 kg/m²  Input and Output Summary (last 24 hours): Intake/Output Summary (Last 24 hours) at 10/30/2022 1112  Last data filed at 10/30/2022 0830  Gross per 24 hour   Intake 240 ml   Output 650 ml   Net -410 ml       Physical Exam:   Physical Exam  Constitutional:       General: She is not in acute distress  Appearance: She is obese     HENT:      Head: Normocephalic and atraumatic  Nose: No congestion  Eyes:      Conjunctiva/sclera: Conjunctivae normal    Cardiovascular:      Rate and Rhythm: Normal rate and regular rhythm  Heart sounds: No murmur heard  Pulmonary:      Effort: No respiratory distress  Breath sounds: No wheezing or rales  Comments: Decreased breath sounds b/l  Abdominal:      General: There is no distension  Tenderness: There is no abdominal tenderness  There is no guarding  Musculoskeletal:      Right lower leg: Edema present  Left lower leg: Edema present  Skin:     Comments: LLE dressing intact    Neurological:      Mental Status: She is oriented to person, place, and time  Psychiatric:         Mood and Affect: Mood normal           Additional Data:     Labs:  Results from last 7 days   Lab Units 10/30/22  0504   WBC Thousand/uL 12 90*   HEMOGLOBIN g/dL 7 8*   HEMATOCRIT % 25 5*   PLATELETS Thousands/uL 368   NEUTROS PCT % 73   LYMPHS PCT % 14   MONOS PCT % 7   EOS PCT % 4     Results from last 7 days   Lab Units 10/30/22  0504 10/25/22  0524 10/24/22  0437   SODIUM mmol/L 137   < > 141   POTASSIUM mmol/L 4 5   < > 2 8*   CHLORIDE mmol/L 103   < > 105   CO2 mmol/L 28   < > 28   BUN mg/dL 20   < > 19   CREATININE mg/dL 1 39*   < > 1 44*   ANION GAP mmol/L 6   < > 8   CALCIUM mg/dL 9 2   < > 8 7   ALBUMIN g/dL  --   --  2 2*   TOTAL BILIRUBIN mg/dL  --   --  0 24   ALK PHOS U/L  --   --  100   ALT U/L  --   --  26   AST U/L  --   --  39   GLUCOSE RANDOM mg/dL 110   < > 86    < > = values in this interval not displayed           Results from last 7 days   Lab Units 10/30/22  1048 10/30/22  1045 10/30/22  0647 10/29/22  2045 10/29/22  1629 10/29/22  1601 10/29/22  1052 10/29/22  0743 10/28/22  2123 10/28/22  1514 10/28/22  1223 10/28/22  0703   POC GLUCOSE mg/dl 117 127 123 186* 255* 254* 151* 110 159* 129 93 78     Results from last 7 days   Lab Units 10/25/22  0524   HEMOGLOBIN A1C % 9 8* Lines/Drains:  Invasive Devices  Report    Peripheral Intravenous Line  Duration           Long-Dwell Peripheral IV (Midline) 10/27/22 Left Brachial 3 days                Imaging: no new    Recent Cultures (last 7 days):         Last 24 Hours Medication List:   Current Facility-Administered Medications   Medication Dose Route Frequency Provider Last Rate   • acetaminophen  650 mg Oral Q6H PRN Sugey Wiseman PA-C     • albuterol  2 puff Inhalation Q4H PRN Amy Parry MD     • ALPRAZolam  0 25 mg Oral Daily PRN Sugey Wiseman PA-C     • amLODIPine  10 mg Oral Daily ALEX Pelletier     • aspirin  81 mg Oral Daily Sugey Wiseman PA-C     • atorvastatin  40 mg Oral Daily With Faheem Cohen MD     • carvedilol  25 mg Oral BID With Meals ALEX Pelletier     • gabapentin  200 mg Oral BID Sugey Wiseman PA-C     • heparin (porcine)  5,000 Units Subcutaneous Q8H [facility] Sugey Wiseman PA-C     • hydrALAZINE  10 mg Intravenous Q6H PRN Sugey Wiseman PA-C     • insulin glargine  30 Units Subcutaneous HS Christine Ureña MD     • insulin lispro  1-6 Units Subcutaneous 4x Daily (AC & HS) Sugey Wiseman PA-C     • metoclopramide  5 mg Intravenous Q6H PRN Patel Duncan DO     • morphine injection  4 mg Intravenous Q4H PRN Patel Duncan DO     • ondansetron  4 mg Intravenous Q6H PRN Sugey Wiseman PA-C     • oxyCODONE  5 mg Oral Q4H PRN Amy Parry MD      Or   • oxyCODONE  10 mg Oral Q4H PRN Amy Parry MD     • sertraline  100 mg Oral Daily Sugey Matos Mc, PA-C          Today, Patient Was Seen By: Christine Ureña    **Please Note: This note may have been constructed using a voice recognition system  **

## 2022-10-30 NOTE — PROGRESS NOTES
[address] [facility] NOTE   Hannah Steiner 65 y o  female MRN: [id number]  Unit/Bed#: E2 -01 Encounter: [id number]  Reason for Consult:  Acute kidney injury    ASSESSMENT and PLAN:  81-year-old female with a history of diabetes mellitus type 2, hypertension presenting with nonhealing left foot wound  Nephrology consult for acute kidney injury and need for CTA  Acute kidney injury:  · Etiology:  Likely multifactorial due to septic ATN/contrast all in the setting of autoregulatory failure due to ACE-inhibitor    · Baseline creatinine:  0 7-1 1 since 2020  · Peak creatinine 1 4  Creatinine improved to a cayden of 1 13   · Status post IV fluids due to contrast   CTA performed on 10/27  · Required Lasix on 10/27 and 10/28 due to volume overload with improvement-patient reported feeling better, less edematous  No Lasix given on 10/29  · Creatinine slowly increasing the last several days, currently 1 39  · Creatinine increasing post contrast  · Blood pressure acceptable  · Workup:    · UA:  Greater than 3+ protein, innumerable rbc's/wbc's/bacteria  · Imaging CT 02/2020 210 more simple renal cysts but no hydronephrosis  · Plan:  · Vascular planning aortogram 10/31   · Check labs in the a m     If creatinine improving okay to proceed  Will prep accordingly  Hypokalemia:  Likely due to GI losses  Resolved  Blood pressure/volume status:  · Primary hypertension  · Volume status:  Volume overload  · Medications:  Amlodipine 10 mg daily, Coreg 25 mg twice a day, as needed hydralazine  · Plan/recommendations:  · Continue to hold ACE-inhibitor  · Lasix 20 mg IV x1  Monitor response  May need b i d  Dosing    Anemia:  · Iron deficiency  No Venofer at this time due to foot infection  · Hemoglobin fluctuating, 7 8  · Management per primary team    Nonhealing left foot wound/PA D:    · Podiatry and vascular follow-up status post antibiotics  · CTA 10/27:  Diffuse disease noted    High-grade stenosis possible acute shin left popliteal  · Anticipate aortogram 10/31    DISPOSITION:  Diurese  Modest fluid restriction  Check labs in the a m  SUBJECTIVE / 24H INTERVAL HISTORY:  Feels tight, swollen from fluids  Unable to elevate legs due to foot pain with elevation  No overnight events  OBJECTIVE:  Current Weight: Weight - Scale: 113 kg (250 lb)  Vitals:    10/29/22 1816 10/29/22 2203 10/30/22 0500 10/30/22 0735   BP:  154/81  (!) 180/70   BP Location:    Right arm   Pulse:  78  91   Resp:  20  18   Temp:  98 1 °F (36 7 °C)  98 5 °F (36 9 °C)   TempSrc:  Temporal  Tympanic   SpO2: 93% 94%  93%   Weight:   113 kg (250 lb)        Intake/Output Summary (Last 24 hours) at 10/30/2022 0826  Last data filed at 10/30/2022 0601  Gross per 24 hour   Intake --   Output 650 ml   Net -650 ml   General: NAD, sitting on the edge of the bed  Skin: no rash  Eyes: anicteric sclera  ENT: moist mucous membrane  Neck: supple  Chest: CTA b/l, no ronchii, no wheeze, no rubs, no rales, normal effort  CVS: s1s2, no murmur, no gallop, no rub, normal rate regular rhythm  Abdomen: soft, nontender, nl sounds, nondistended  Extremities:  Nonpitting edema    No mottling or cyanosis  : no pa, voiding  Neuro: AAOX3  Psych: normal affect    Medications:    Current Facility-Administered Medications:   •  acetaminophen (TYLENOL) tablet 650 mg, 650 mg, Oral, Q6H PRN, Sugey Wiseman PA-C, 650 mg at 10/23/22 1751  •  albuterol (PROVENTIL HFA,VENTOLIN HFA) inhaler 2 puff, 2 puff, Inhalation, Q4H PRN, Amy Arango MD, 2 puff at 10/30/22 0611  •  ALPRAZolam (XANAX) tablet 0 25 mg, 0 25 mg, Oral, Daily PRN, Sugey Wiseman PA-C, 0 25 mg at 10/28/22 1120  •  amLODIPine (NORVASC) tablet 10 mg, 10 mg, Oral, Daily, ALEX Coburn, 10 mg at 10/29/22 0940  •  aspirin chewable tablet 81 mg, 81 mg, Oral, Daily, Sugey Wiseman PA-C, 81 mg at 10/29/22 9803  •  atorvastatin (LIPITOR) tablet 40 mg, 40 mg, Oral, Daily With Dinner, Gilberto Bhardwaj MD, 40 mg at 10/29/22 1556  •  carvedilol (COREG) tablet 25 mg, 25 mg, Oral, BID With Meals, ALEX Ordoñez, 25 mg at 10/30/22 0730  •  gabapentin (NEURONTIN) capsule 200 mg, 200 mg, Oral, BID, Sugey Wiseman PA-C, 200 mg at 10/29/22 1802  •  heparin (porcine) subcutaneous injection 5,000 Units, 5,000 Units, Subcutaneous, Q8H [facility] intermediate, 5,000 Units at 10/30/22 0506 **AND** [CANCELED] Platelet count, , , Once, Sugey Wiseman PA-C  •  hydrALAZINE (APRESOLINE) injection 10 mg, 10 mg, Intravenous, Q6H PRN, Sugey Wiseman PA-C, 10 mg at 10/27/22 1857  •  insulin glargine (LANTUS) subcutaneous injection 30 Units 0 3 mL, 30 Units, Subcutaneous, HS, Amy Parry MD, 30 Units at 10/29/22 2103  •  insulin lispro (HumaLOG) 100 units/mL subcutaneous injection 1-6 Units, 1-6 Units, Subcutaneous, 4x Daily (AC & HS), 1 Units at 10/29/22 2104 **AND** Fingerstick Glucose (POCT), , , 4x Daily AC and at bedtime, Sugey Wiseman PA-C  •  metoclopramide (REGLAN) injection 5 mg, 5 mg, Intravenous, Q6H PRN, Patel Duncan DO, 5 mg at 10/23/22 2066  •  morphine injection 4 mg, 4 mg, Intravenous, Q4H PRN, Palmira Arceo DO, 4 mg at 10/30/22 9228  •  ondansetron (ZOFRAN) injection 4 mg, 4 mg, Intravenous, Q6H PRN, Sugey Wiseman PA-C, 4 mg at 10/22/22 2241  •  oxyCODONE (ROXICODONE) IR tablet 5 mg, 5 mg, Oral, Q4H PRN, Patel Duncan DO, 5 mg at 10/30/22 0546  •  sertraline (ZOLOFT) tablet 100 mg, 100 mg, Oral, Daily, Sugey Wiseman PA-C, 100 mg at 10/29/22 5298    Laboratory Results:  Results from last 7 days   Lab Units 10/30/22  0504 10/29/22  0537 10/28/22  0455 10/27/22  0554 10/26/22  1125 10/26/22  0617 10/25/22  0524 10/24/22  0437   WBC Thousand/uL 12 90* 5 95 13 85*  --   --  14 19* 12 49* 12 61*   HEMOGLOBIN g/dL 7 8* 15 2 8 5*  --   --  10 5* 9 0* 9 0*   HEMATOCRIT % 25 5* 48 8* 28 0*  --   --  32 9* 28 8* 28 9*   PLATELETS Thousands/uL 368 273 359  --   --  370 329 332 POTASSIUM mmol/L 4 5 4 3 4 7 3 1* 3 8  --  3 2* 2 8*   CHLORIDE mmol/L 103 105 104 107 105  --  105 105   CO2 mmol/L 28 26 27 25 27  --  25 28   BUN mg/dL 20 17 16 14 16  --  19 19   CREATININE mg/dL 1 39* 1 28 1 20 1 13 1 17  --  1 43* 1 44*   CALCIUM mg/dL 9 2 9 2 8 9 8 7 8 6  --  8 9 8 7   MAGNESIUM mg/dL  --   --   --   --   --   --   --  2 0

## 2022-10-30 NOTE — ASSESSMENT & PLAN NOTE
Lab Results   Component Value Date    HGBA1C 9 8 (H) 10/25/2022     Recent Labs     10/29/22  2045 10/30/22  0647 10/30/22  1045 10/30/22  1048   POCGLU 186* 123 127 117     · Uncontrolled with A1C at 9 8  · Apparently has been rationing glargine due to cost  · Blood glucose currently in appropriate range on 30 units daily  · Continue insulin sliding scale

## 2022-10-30 NOTE — ASSESSMENT & PLAN NOTE
65year old female former smoker w/HTN, HLD, uncontrolled type II DM (A1c 9 8), hx CVA, presenting with nonhealing extensive L heel ulceration and R hallux and 5th digit ulceration  Vascular surgery consulted for input  TARI duplex 10/24/22: Rt- monophasic waveforms of the CFA indicative of proximal disease, SHARIF 0 80/64/61  Lt- diffuse disease without focal stenosis, SHARIF 0 61/21/35    CTA abd w/run off 10/27/22: Moderate stenosis of mid infrarenal aorta  3 vessel run off b/l, fem-pop dz, L pop >75% stenosis, L prox SFA 50-75% stenosis  R distal SFA 50-75% stenosis    MRI 10/27 L heel: no evidence of OM    Recommendations:  -Bilateral tissue loss in the setting of uncontrolled type II DM and NYDIA on admission  -TARI demonstrates adequate pressures on the RLE however LLE with pressures below healing    -Now s/p CTA with run off which demonstrates 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

## 2022-10-30 NOTE — ASSESSMENT & PLAN NOTE
Iatrogenic fluid overload, patient received IV fluids in setting of NYDIA, contrast studies  Received pulse doses of Lasix 20 mg IV, plan for another dose today  2D Echo ordered

## 2022-10-30 NOTE — ASSESSMENT & PLAN NOTE
Hemoglobin fluctuating with baseline around 10  Today at 7 8 possibly dilutional with evidence of fluid overload  No evidence of bleeding  Will obtain Hemoccult stool as a precaution  Monitor CBC

## 2022-10-30 NOTE — PLAN OF CARE
Problem: Potential for Falls  Goal: Patient will remain free of falls  Description: INTERVENTIONS:  - Educate patient/family on patient safety including physical limitations  - Instruct patient to call for assistance with activity   - Consult OT/PT to assist with strengthening/mobility   - Keep Call bell within reach  - Keep bed low and locked with side rails adjusted as appropriate  - Keep care items and personal belongings within reach  - Initiate and maintain comfort rounds  - Make Fall Risk Sign visible to staff  - Offer Toileting every 2 Hours, in advance of need  - Initiate/Maintain bed  chair alarm  - Obtain necessary fall risk management equipment: bed chair alarm, call bell, gripper sock, walker, bedside commode  - Apply yellow socks and bracelet for high fall risk patients  - Consider moving patient to room near nurses station  Outcome: Progressing     Problem: MOBILITY - ADULT  Goal: Maintain or return to baseline ADL function  Description: INTERVENTIONS:  -  Assess patient's ability to carry out ADLs; assess patient's baseline for ADL function and identify physical deficits which impact ability to perform ADLs (bathing, care of mouth/teeth, toileting, grooming, dressing, etc )  - Assess/evaluate cause of self-care deficits   - Assess range of motion  - Assess patient's mobility; develop plan if impaired  - Assess patient's need for assistive devices and provide as appropriate  - Encourage maximum independence but intervene and supervise when necessary  - Involve family in performance of ADLs  - Assess for home care needs following discharge   - Consider OT consult to assist with ADL evaluation and planning for discharge  - Provide patient education as appropriate  Outcome: Progressing  Goal: Maintains/Returns to pre admission functional level  Description: INTERVENTIONS:  - Perform BMAT or MOVE assessment daily    - Set and communicate daily mobility goal to care team and patient/family/caregiver     - Collaborate with rehabilitation services on mobility goals if consulted  - Assist patient in repositioning every 2 hours    - Dangle patient 3 times a day  - Stand patient 3 times a day  - Out of bed to chair as tolerated  - Out of bed for meals  - Out of bed for toileting  - Record patient progress and toleration of activity level   Outcome: Progressing     Problem: PAIN - ADULT  Goal: Verbalizes/displays adequate comfort level or baseline comfort level  Description: Interventions:  - Encourage patient to monitor pain and request assistance  - Assess pain using appropriate pain scale  - Administer analgesics based on type and severity of pain and evaluate response  - Implement non-pharmacological measures as appropriate and evaluate response  - Consider cultural and social influences on pain and pain management  - Notify physician/advanced practitioner if interventions unsuccessful or patient reports new pain  Outcome: Progressing     Problem: INFECTION - ADULT  Goal: Absence or prevention of progression during hospitalization  Description: INTERVENTIONS:  - Assess and monitor for signs and symptoms of infection  - Monitor lab/diagnostic results  - Monitor all insertion sites, i e  indwelling lines, tubes, and drains  - Administer medications as ordered  - Instruct and encourage patient and family to use good hand hygiene technique  Outcome: Progressing     Problem: SAFETY ADULT  Goal: Patient will remain free of falls  Description: INTERVENTIONS:  - Educate patient/family on patient safety including physical limitations  - Instruct patient to call for assistance with activity   - Consult OT/PT to assist with strengthening/mobility   - Keep Call bell within reach  - Keep bed low and locked with side rails adjusted as appropriate  - Keep care items and personal belongings within reach  - Initiate and maintain comfort rounds  - Make Fall Risk Sign visible to staff  - Offer Toileting every 2 Hours, in advance of need  - Initiate/Maintain bed  chair alarm  - Obtain necessary fall risk management equipment: bed chair alarm, call bell, gripper sock, walker, bedside commode  - Apply yellow socks and bracelet for high fall risk patients  - Consider moving patient to room near nurses station  Outcome: Progressing  Goal: Maintain or return to baseline ADL function  Description: INTERVENTIONS:  -  Assess patient's ability to carry out ADLs; assess patient's baseline for ADL function and identify physical deficits which impact ability to perform ADLs (bathing, care of mouth/teeth, toileting, grooming, dressing, etc )  - Assess/evaluate cause of self-care deficits   - Assess range of motion  - Assess patient's mobility; develop plan if impaired  - Assess patient's need for assistive devices and provide as appropriate  - Encourage maximum independence but intervene and supervise when necessary  - Involve family in performance of ADLs  - Assess for home care needs following discharge   - Consider OT consult to assist with ADL evaluation and planning for discharge  - Provide patient education as appropriate  Outcome: Progressing  Goal: Maintains/Returns to pre admission functional level  Description: INTERVENTIONS:  - Perform BMAT or MOVE assessment daily    - Set and communicate daily mobility goal to care team and patient/family/caregiver  - Collaborate with rehabilitation services on mobility goals if consulted  - Assist patient in repositioning every 2 hours    - Dangle patient 3 times a day  - Stand patient 3 times a day  - Out of bed to chair as tolerated  - Out of bed for meals  - Out of bed for toileting  - Record patient progress and toleration of activity level   Outcome: Progressing     Problem: DISCHARGE PLANNING  Goal: Discharge to home or other facility with appropriate resources  Description: INTERVENTIONS:  - Identify barriers to discharge w/patient   - Arrange for needed discharge resources and transportation as appropriate  - Identify discharge learning needs  - Refer to Case Management Department for coordinating discharge planning if the patient needs post-hospital services based on physician/advanced practitioner order   Outcome: Progressing     Problem: Knowledge Deficit  Goal: Patient/family/caregiver demonstrates understanding of disease process, treatment plan, medications, and discharge instructions  Description: Complete learning assessment and assess knowledge base    Interventions:  - Provide teaching at level of understanding  - Provide teaching via preferred learning methods  Outcome: Progressing     Problem: METABOLIC, FLUID AND ELECTROLYTES - ADULT  Goal: Electrolytes maintained within normal limits  Description: INTERVENTIONS:  - Monitor labs and assess patient for signs and symptoms of electrolyte imbalances  - Administer electrolyte replacement as ordered  - Monitor response to electrolyte replacements, including repeat lab results as appropriate  - Instruct patient on fluid and nutrition as appropriate  Outcome: Progressing  Goal: Fluid balance maintained  Description: INTERVENTIONS:  - Monitor labs   - Monitor I/O and WT  - Instruct patient on fluid and nutrition as appropriate  - Assess for signs & symptoms of volume excess or deficit  Outcome: Progressing  Goal: Glucose maintained within target range  Description: INTERVENTIONS:  - Monitor Blood Glucose as ordered  - Assess for signs and symptoms of hyperglycemia and hypoglycemia  - Administer ordered medications to maintain glucose within target range  - Assess nutritional intake and initiate nutrition service referral as needed  Outcome: Progressing     Problem: SKIN/TISSUE INTEGRITY - ADULT  Goal: Skin Integrity remains intact(Skin Breakdown Prevention)  Description: Assess:  -Perform Nicanor assessment every shift  -Clean and moisturize skin every shift  -Inspect skin when repositioning, toileting, and assisting with ADLS  -Assess under medical devices   -Assess extremities for adequate circulation and sensation     Bed Management:  -Have minimal linens on bed & keep smooth, unwrinkled  -Change linens as needed when moist or perspiring  -Avoid sitting or lying in one position for more than 2 hours while in bed    Toileting:  -Offer bedside commode  -Assess for incontinence every 2 hours  -Use incontinent care products after each incontinent episode     Activity:  -Mobilize patient 3 times a day  -Encourage or provide ROM exercises   -Turn and reposition patient every 2 Hours  -Use appropriate equipment to lift or move patient in bed  -Instruct/ Assist with weight shifting every 15 minutes when out of bed in chair  -Consider limitation of chair time 1 hour intervals    Skin Care:  -Avoid use of baby powder, tape, friction and shearing, hot water or constrictive clothing  -Relieve pressure over bony prominences using waffle cushion when in chair  -Do not massage red bony areas    Outcome: Progressing  Goal: Incision(s), wounds(s) or drain site(s) healing without S/S of infection  Description: INTERVENTIONS  - Assess and document dressing, incision, wound bed, drain sites and surrounding tissue  - Provide patient and family education  - Perform skin care/dressing changes everyday as ordered  Outcome: Progressing  Goal: Pressure injury heals and does not worsen  Description: Interventions:  - Implement low air loss mattress or specialty surface (Criteria met)  - Apply silicone foam dressing  - Instruct/assist with weight shifting every 15 minutes when in chair   - Limit chair time to 1 hour intervals  - Use special pressure reducing interventions such as waffle cushion when in chair   - Perform passive or active ROM   - Turn and reposition patient & offload bony prominences every 2 hours   - Utilize friction reducing device or surface for transfers   - Consider consults to  interdisciplinary teams such as wound care, PT/OT  - Use incontinent care products after each incontinent episode   - Consider nutrition services referral as needed  Outcome: Progressing     Problem: MUSCULOSKELETAL - ADULT  Goal: Maintain or return mobility to safest level of function  Description: INTERVENTIONS:  - Assess patient's ability to carry out ADLs; assess patient's baseline for ADL function and identify physical deficits which impact ability to perform ADLs (bathing, care of mouth/teeth, toileting, grooming, dressing, etc )  - Assess/evaluate cause of self-care deficits   - Assess range of motion  - Assess patient's mobility  - Assess patient's need for assistive devices and provide as appropriate  - Encourage maximum independence but intervene and supervise when necessary  - Involve family in performance of ADLs  - Assess for home care needs following discharge   - Consider OT consult to assist with ADL evaluation and planning for discharge  - Provide patient education as appropriate  Outcome: Progressing  Goal: Maintain proper alignment of affected body part  Description: INTERVENTIONS:  - Support, maintain and protect limb and body alignment  - Provide patient/ family with appropriate education  Outcome: Progressing     Problem: CARDIOVASCULAR - ADULT  Goal: Maintains optimal cardiac output and hemodynamic stability  Description: INTERVENTIONS:  - Monitor I/O, vital signs and rhythm  - Monitor for S/S and trends of decreased cardiac output  - Administer and titrate ordered vasoactive medications to optimize hemodynamic stability  - Assess quality of pulses, skin color and temperature  - Assess for signs of decreased coronary artery perfusion  - Instruct patient to report change in severity of symptoms  Outcome: Progressing     Problem: RESPIRATORY - ADULT  Goal: Achieves optimal ventilation and oxygenation  Description: INTERVENTIONS:  - Assess for changes in respiratory status  - Assess for changes in mentation and behavior  - Position to facilitate oxygenation and minimize respiratory effort  - Oxygen administered by appropriate delivery if ordered  - Initiate smoking cessation education as indicated  - Encourage broncho-pulmonary hygiene including cough, deep breathe, Incentive Spirometry  - Assess the need for suctioning and aspirate as needed  - Assess and instruct to report SOB or any respiratory difficulty  - Respiratory Therapy support as indicated  Outcome: Progressing

## 2022-10-30 NOTE — PROGRESS NOTES
[address] [facility]  Progress Note - Sudarshan Abu 1960, 65 y o  female MRN: [id number]  Unit/Bed#: E2 -01 Encounter: [id number]  Primary Care Provider: ALEX Sapp   Date and time admitted to hospital: 10/21/2022  7:58 PM    PAD (peripheral artery disease) [facility])  Assessment & Plan  65year old female former smoker w/HTN, HLD, uncontrolled type II DM (A1c 9 8), hx CVA, presenting with nonhealing extensive L heel ulceration and R hallux and 5th digit ulceration  Vascular surgery consulted for input  TARI duplex 10/24/22: Rt- monophasic waveforms of the CFA indicative of proximal disease, SHARIF 0 80/64/61  Lt- diffuse disease without focal stenosis, SHARIF 0 61/21/35    CTA abd w/run off 10/27/22: Moderate stenosis of mid infrarenal aorta  3 vessel run off b/l, fem-pop dz, L pop >75% stenosis, L prox SFA 50-75% stenosis  R distal SFA 50-75% stenosis    MRI 10/27 L heel: no evidence of OM    Recommendations:  -Bilateral tissue loss in the setting of uncontrolled type II DM and NYDIA on admission  -TARI demonstrates adequate pressures on the RLE however LLE with pressures below healing    -Now s/p CTA with run off which demonstrates R iliac disease with diffuse fem/pop disease bilaterally  L pop with high grade stenosis/possible occlusion  -D/w Dr Lucia Henry, L heel ulceration stable without OM  -Recommend abdominal aortogram w/ LLE run off, possible R iliac intervention, possible LLE intervention for 10/31  D/w IR  -Medical management with ASA, statin  -Continue local wound care per podiatry  -Medical management and glucose control per [facility]  -Nephrology on board  Will require preprocedural IVF hydration            Subjective/Objective     Subjective:  Patient reports mild left foot pain  Objective:   Vitals: Blood pressure 154/81, pulse 78, temperature 98 1 °F (36 7 °C), temperature source Temporal, resp   rate 20, weight 113 kg (250 lb), SpO2 94 %, not currently breastfeeding  ,Body mass index is 43 25 kg/m²  I/O       10/28 0701  10/29 0700 10/29 0701  10/30 0700 10/30 0701  10/31 0700    P  O        I V  (mL/kg)       IV Piggyback       Total Intake(mL/kg)       Urine (mL/kg/hr) 300 (0 1) 650 (0 2)     Total Output 300 650     Net -300 -650            Unmeasured Urine Occurrence  4 x           Physical Exam:  Gen: NAD, Aox3, Comfortable in bed  Chest: Normal work of breathing, no respiratory distress  Abd: Soft, ND, NT  Ext:  Bilateral LE pitting edema L > R  left foot is wrapped  Dressings clean and dry  Skin: Warm, Dry, Intact      Lab, Imaging and other studies:   I have personally reviewed pertinent reports    , CBC with diff:   Lab Results   Component Value Date    WBC 12 90 (H) 10/30/2022    HGB 7 8 (L) 10/30/2022    HCT 25 5 (L) 10/30/2022    MCV 90 10/30/2022     10/30/2022    MCH 27 4 10/30/2022    MCHC 30 6 (L) 10/30/2022    RDW 15 0 10/30/2022    MPV 8 8 (L) 10/30/2022    NRBC 0 10/30/2022   , BMP/CMP:   Lab Results   Component Value Date    SODIUM 137 10/30/2022    K 4 5 10/30/2022     10/30/2022    CO2 28 10/30/2022    BUN 20 10/30/2022    CREATININE 1 39 (H) 10/30/2022    CALCIUM 9 2 10/30/2022    EGFR 40 10/30/2022     VTE Pharmacologic Prophylaxis: Heparin  VTE Mechanical Prophylaxis: sequential compression device        Stephanie Crouch  10/30/2022  7:02 AM

## 2022-10-30 NOTE — ASSESSMENT & PLAN NOTE
Present on admission with Leucocytosis and tachycardia and resolved  ID input appreciated  Sepsis suspected more likely due to UTI than left heel wound which is without purulence, osteomyelitis or cellulitis   Blood cultures no growth 4 days  S/p Zosyn therapy x7 days  Stable off Abx 48 hours with leukocytosis resolved

## 2022-10-31 ENCOUNTER — APPOINTMENT (INPATIENT)
Dept: RADIOLOGY | Facility: HOSPITAL | Age: 62
DRG: 853 | End: 2022-10-31
Payer: COMMERCIAL

## 2022-10-31 ENCOUNTER — APPOINTMENT (INPATIENT)
Dept: RADIOLOGY | Facility: HOSPITAL | Age: 62
DRG: 853 | End: 2022-10-31
Attending: RADIOLOGY
Payer: COMMERCIAL

## 2022-10-31 LAB
ANION GAP SERPL CALCULATED.3IONS-SCNC: 7 MMOL/L (ref 4–13)
APTT PPP: 33 SECONDS (ref 23–37)
BASOPHILS # BLD AUTO: 0.08 THOUSANDS/ÂΜL (ref 0–0.1)
BASOPHILS NFR BLD AUTO: 1 % (ref 0–1)
BUN SERPL-MCNC: 20 MG/DL (ref 5–25)
CALCIUM SERPL-MCNC: 9.4 MG/DL (ref 8.3–10.1)
CARDIAC TROPONIN I PNL SERPL HS: 10 NG/L (ref 8–18)
CHLORIDE SERPL-SCNC: 103 MMOL/L (ref 96–108)
CO2 SERPL-SCNC: 28 MMOL/L (ref 21–32)
CREAT SERPL-MCNC: 1.38 MG/DL (ref 0.6–1.3)
EOSINOPHIL # BLD AUTO: 0.69 THOUSAND/ÂΜL (ref 0–0.61)
EOSINOPHIL NFR BLD AUTO: 6 % (ref 0–6)
ERYTHROCYTE [DISTWIDTH] IN BLOOD BY AUTOMATED COUNT: 15.3 % (ref 11.6–15.1)
GFR SERPL CREATININE-BSD FRML MDRD: 41 ML/MIN/1.73SQ M
GLUCOSE SERPL-MCNC: 101 MG/DL (ref 65–140)
GLUCOSE SERPL-MCNC: 104 MG/DL (ref 65–140)
GLUCOSE SERPL-MCNC: 105 MG/DL (ref 65–140)
GLUCOSE SERPL-MCNC: 106 MG/DL (ref 65–140)
GLUCOSE SERPL-MCNC: 208 MG/DL (ref 65–140)
GLUCOSE SERPL-MCNC: 90 MG/DL (ref 65–140)
HCT VFR BLD AUTO: 25.7 % (ref 34.8–46.1)
HGB BLD-MCNC: 7.8 G/DL (ref 11.5–15.4)
IMM GRANULOCYTES # BLD AUTO: 0.19 THOUSAND/UL (ref 0–0.2)
IMM GRANULOCYTES NFR BLD AUTO: 2 % (ref 0–2)
INR PPP: 1.03 (ref 0.84–1.19)
KCT BLD-ACNC: 175 SEC (ref 89–137)
LYMPHOCYTES # BLD AUTO: 2 THOUSANDS/ÂΜL (ref 0.6–4.47)
LYMPHOCYTES NFR BLD AUTO: 16 % (ref 14–44)
MAGNESIUM SERPL-MCNC: 1.7 MG/DL (ref 1.6–2.6)
MCH RBC QN AUTO: 27.1 PG (ref 26.8–34.3)
MCHC RBC AUTO-ENTMCNC: 30.4 G/DL (ref 31.4–37.4)
MCV RBC AUTO: 89 FL (ref 82–98)
MONOCYTES # BLD AUTO: 0.85 THOUSAND/ÂΜL (ref 0.17–1.22)
MONOCYTES NFR BLD AUTO: 7 % (ref 4–12)
NEUTROPHILS # BLD AUTO: 8.78 THOUSANDS/ÂΜL (ref 1.85–7.62)
NEUTS SEG NFR BLD AUTO: 68 % (ref 43–75)
NRBC BLD AUTO-RTO: 0 /100 WBCS
NT-PROBNP SERPL-MCNC: 1348 PG/ML
PHOSPHATE SERPL-MCNC: 5.5 MG/DL (ref 2.3–4.1)
PLATELET # BLD AUTO: 371 THOUSANDS/UL (ref 149–390)
PMV BLD AUTO: 8.6 FL (ref 8.9–12.7)
POTASSIUM SERPL-SCNC: 4.4 MMOL/L (ref 3.5–5.3)
PROTHROMBIN TIME: 13.5 SECONDS (ref 11.6–14.5)
RBC # BLD AUTO: 2.88 MILLION/UL (ref 3.81–5.12)
SODIUM SERPL-SCNC: 138 MMOL/L (ref 135–147)
SPECIMEN SOURCE: ABNORMAL
WBC # BLD AUTO: 12.59 THOUSAND/UL (ref 4.31–10.16)

## 2022-10-31 PROCEDURE — 71045 X-RAY EXAM CHEST 1 VIEW: CPT

## 2022-10-31 PROCEDURE — 83735 ASSAY OF MAGNESIUM: CPT | Performed by: SURGERY

## 2022-10-31 PROCEDURE — 80048 BASIC METABOLIC PNL TOTAL CA: CPT | Performed by: SURGERY

## 2022-10-31 PROCEDURE — 85347 COAGULATION TIME ACTIVATED: CPT

## 2022-10-31 PROCEDURE — C1874 STENT, COATED/COV W/DEL SYS: HCPCS

## 2022-10-31 PROCEDURE — C1876 STENT, NON-COA/NON-COV W/DEL: HCPCS

## 2022-10-31 PROCEDURE — C1760 CLOSURE DEV, VASC: HCPCS

## 2022-10-31 PROCEDURE — X27J395 DILATION OF LEFT FEMORAL ARTERY WITH TWO SUSTAINED RELEASE DRUG-ELUTING INTRALUMINAL DEVICES, PERCUTANEOUS APPROACH, NEW TECHNOLOGY GROUP 5: ICD-10-PCS | Performed by: INTERNAL MEDICINE

## 2022-10-31 PROCEDURE — 97530 THERAPEUTIC ACTIVITIES: CPT

## 2022-10-31 PROCEDURE — 37221 PR REVSC OPN/PRQ ILIAC ART W/STNT PLMT & ANGIOPLSTY: CPT | Performed by: INTERNAL MEDICINE

## 2022-10-31 PROCEDURE — 97535 SELF CARE MNGMENT TRAINING: CPT

## 2022-10-31 PROCEDURE — 99232 SBSQ HOSP IP/OBS MODERATE 35: CPT | Performed by: STUDENT IN AN ORGANIZED HEALTH CARE EDUCATION/TRAINING PROGRAM

## 2022-10-31 PROCEDURE — 37221 HB ILIAC REVASC W/STENT: CPT

## 2022-10-31 PROCEDURE — 37226 PR REVSC OPN/PRQ FEM/POP W/STNT/ANGIOP SM VSL: CPT | Performed by: INTERNAL MEDICINE

## 2022-10-31 PROCEDURE — 99232 SBSQ HOSP IP/OBS MODERATE 35: CPT | Performed by: INTERNAL MEDICINE

## 2022-10-31 PROCEDURE — 75630 X-RAY AORTA LEG ARTERIES: CPT

## 2022-10-31 PROCEDURE — 85025 COMPLETE CBC W/AUTO DIFF WBC: CPT | Performed by: SURGERY

## 2022-10-31 PROCEDURE — 37226 HB FEM/POPL REVASC W/STENT: CPT

## 2022-10-31 PROCEDURE — 04FL3ZZ FRAGMENTATION OF LEFT FEMORAL ARTERY, PERCUTANEOUS APPROACH: ICD-10-PCS | Performed by: INTERNAL MEDICINE

## 2022-10-31 PROCEDURE — C1769 GUIDE WIRE: HCPCS

## 2022-10-31 PROCEDURE — C1725 CATH, TRANSLUMIN NON-LASER: HCPCS

## 2022-10-31 PROCEDURE — 83880 ASSAY OF NATRIURETIC PEPTIDE: CPT | Performed by: STUDENT IN AN ORGANIZED HEALTH CARE EDUCATION/TRAINING PROGRAM

## 2022-10-31 PROCEDURE — 82948 REAGENT STRIP/BLOOD GLUCOSE: CPT

## 2022-10-31 PROCEDURE — 84484 ASSAY OF TROPONIN QUANT: CPT | Performed by: STUDENT IN AN ORGANIZED HEALTH CARE EDUCATION/TRAINING PROGRAM

## 2022-10-31 PROCEDURE — C1894 INTRO/SHEATH, NON-LASER: HCPCS

## 2022-10-31 PROCEDURE — 97542 WHEELCHAIR MNGMENT TRAINING: CPT

## 2022-10-31 PROCEDURE — 36140 INTRO NDL ICATH UPR/LXTR ART: CPT

## 2022-10-31 PROCEDURE — 76937 US GUIDE VASCULAR ACCESS: CPT | Performed by: INTERNAL MEDICINE

## 2022-10-31 PROCEDURE — 85610 PROTHROMBIN TIME: CPT | Performed by: SURGERY

## 2022-10-31 PROCEDURE — 85730 THROMBOPLASTIN TIME PARTIAL: CPT | Performed by: SURGERY

## 2022-10-31 PROCEDURE — 76937 US GUIDE VASCULAR ACCESS: CPT

## 2022-10-31 PROCEDURE — 99152 MOD SED SAME PHYS/QHP 5/>YRS: CPT | Performed by: INTERNAL MEDICINE

## 2022-10-31 PROCEDURE — 84100 ASSAY OF PHOSPHORUS: CPT | Performed by: SURGERY

## 2022-10-31 PROCEDURE — NC001 PR NO CHARGE: Performed by: SURGERY

## 2022-10-31 PROCEDURE — C1887 CATHETER, GUIDING: HCPCS

## 2022-10-31 PROCEDURE — 047C3DZ DILATION OF RIGHT COMMON ILIAC ARTERY WITH INTRALUMINAL DEVICE, PERCUTANEOUS APPROACH: ICD-10-PCS | Performed by: INTERNAL MEDICINE

## 2022-10-31 PROCEDURE — C9764 REVASC INTRAVASC LITHOTRIPSY: HCPCS

## 2022-10-31 PROCEDURE — B41DYZZ FLUOROSCOPY OF AORTA AND BILATERAL LOWER EXTREMITY ARTERIES USING OTHER CONTRAST: ICD-10-PCS | Performed by: INTERNAL MEDICINE

## 2022-10-31 RX ORDER — HEPARIN SODIUM 1000 [USP'U]/ML
INJECTION, SOLUTION INTRAVENOUS; SUBCUTANEOUS CODE/TRAUMA/SEDATION MEDICATION
Status: COMPLETED | OUTPATIENT
Start: 2022-10-31 | End: 2022-10-31

## 2022-10-31 RX ORDER — MIDAZOLAM HYDROCHLORIDE 2 MG/2ML
INJECTION, SOLUTION INTRAMUSCULAR; INTRAVENOUS CODE/TRAUMA/SEDATION MEDICATION
Status: COMPLETED | OUTPATIENT
Start: 2022-10-31 | End: 2022-10-31

## 2022-10-31 RX ORDER — FENTANYL CITRATE 50 UG/ML
INJECTION, SOLUTION INTRAMUSCULAR; INTRAVENOUS CODE/TRAUMA/SEDATION MEDICATION
Status: COMPLETED | OUTPATIENT
Start: 2022-10-31 | End: 2022-10-31

## 2022-10-31 RX ORDER — LIDOCAINE HYDROCHLORIDE 10 MG/ML
INJECTION, SOLUTION EPIDURAL; INFILTRATION; INTRACAUDAL; PERINEURAL CODE/TRAUMA/SEDATION MEDICATION
Status: COMPLETED | OUTPATIENT
Start: 2022-10-31 | End: 2022-10-31

## 2022-10-31 RX ORDER — IODIXANOL 320 MG/ML
250 INJECTION, SOLUTION INTRAVASCULAR
Status: COMPLETED | OUTPATIENT
Start: 2022-10-31 | End: 2022-10-31

## 2022-10-31 RX ORDER — FUROSEMIDE 10 MG/ML
40 INJECTION INTRAMUSCULAR; INTRAVENOUS ONCE
Status: COMPLETED | OUTPATIENT
Start: 2022-10-31 | End: 2022-10-31

## 2022-10-31 RX ADMIN — GABAPENTIN 200 MG: 100 CAPSULE ORAL at 08:28

## 2022-10-31 RX ADMIN — HEPARIN SODIUM 6000 UNITS: 1000 INJECTION INTRAVENOUS; SUBCUTANEOUS at 14:07

## 2022-10-31 RX ADMIN — FENTANYL CITRATE 25 MCG: 50 INJECTION INTRAMUSCULAR; INTRAVENOUS at 15:38

## 2022-10-31 RX ADMIN — MIDAZOLAM 0.5 MG: 1 INJECTION INTRAMUSCULAR; INTRAVENOUS at 13:24

## 2022-10-31 RX ADMIN — AMLODIPINE BESYLATE 10 MG: 10 TABLET ORAL at 08:28

## 2022-10-31 RX ADMIN — HEPARIN SODIUM 5000 UNITS: 5000 INJECTION INTRAVENOUS; SUBCUTANEOUS at 21:08

## 2022-10-31 RX ADMIN — FUROSEMIDE 40 MG: 10 INJECTION, SOLUTION INTRAMUSCULAR; INTRAVENOUS at 18:09

## 2022-10-31 RX ADMIN — GABAPENTIN 200 MG: 100 CAPSULE ORAL at 18:09

## 2022-10-31 RX ADMIN — LIDOCAINE HYDROCHLORIDE 10 ML: 10 INJECTION, SOLUTION EPIDURAL; INFILTRATION; INTRACAUDAL at 13:39

## 2022-10-31 RX ADMIN — OXYCODONE 5 MG: 5 TABLET ORAL at 21:08

## 2022-10-31 RX ADMIN — ATORVASTATIN CALCIUM 40 MG: 40 TABLET, FILM COATED ORAL at 18:09

## 2022-10-31 RX ADMIN — HEPARIN SODIUM 5000 UNITS: 5000 INJECTION INTRAVENOUS; SUBCUTANEOUS at 05:17

## 2022-10-31 RX ADMIN — SERTRALINE HYDROCHLORIDE 100 MG: 100 TABLET ORAL at 08:28

## 2022-10-31 RX ADMIN — SODIUM CHLORIDE 50 ML/HR: 0.9 INJECTION, SOLUTION INTRAVENOUS at 00:12

## 2022-10-31 RX ADMIN — CARVEDILOL 25 MG: 25 TABLET, FILM COATED ORAL at 18:09

## 2022-10-31 RX ADMIN — OXYCODONE 5 MG: 5 TABLET ORAL at 06:34

## 2022-10-31 RX ADMIN — LORAZEPAM 0.5 MG: 0.5 TABLET ORAL at 12:10

## 2022-10-31 RX ADMIN — INSULIN GLARGINE 30 UNITS: 100 INJECTION, SOLUTION SUBCUTANEOUS at 21:08

## 2022-10-31 RX ADMIN — CARVEDILOL 25 MG: 25 TABLET, FILM COATED ORAL at 07:31

## 2022-10-31 RX ADMIN — FENTANYL CITRATE 25 MCG: 50 INJECTION INTRAMUSCULAR; INTRAVENOUS at 13:24

## 2022-10-31 RX ADMIN — IODIXANOL 180 ML: 320 INJECTION, SOLUTION INTRAVASCULAR at 16:10

## 2022-10-31 RX ADMIN — LORAZEPAM 0.5 MG: 0.5 TABLET ORAL at 21:08

## 2022-10-31 RX ADMIN — INSULIN LISPRO 2 UNITS: 100 INJECTION, SOLUTION INTRAVENOUS; SUBCUTANEOUS at 21:07

## 2022-10-31 NOTE — OCCUPATIONAL THERAPY NOTE
Occupational Therapy Progress Note     Patient Name: Luda ENNISFD'M Date: 10/31/2022  Problem List  Principal Problem:    Non healing left heel wound  Active Problems:    Depression, recurrent ([facility] )    Benign essential hypertension    Type 2 diabetes mellitus with hyperglycemia, with long-term current use of insulin ([facility])    Wound of lower extremity    N&V (nausea and vomiting)    Sepsis ([facility])    Abnormal EKG    Hypertensive urgency    UTI (urinary tract infection)    PAD (peripheral artery disease) ([facility])    NYDIA (acute kidney injury) ([facility] )    Generalized edema due to fluid overload    Acute on chronic anemia    Renal insufficiency          10/31/22 1022   OT Last Visit   OT Visit Date 10/31/22   Note Type   Note Type Treatment   Pain Assessment   Pain Assessment Tool 0-10   Pain Score 6   Pain Location/Orientation Orientation: Left; Location: Foot   Hospital Pain Intervention(s) Ambulation/increased activity;Repositioned; Emotional support   Restrictions/Precautions   Weight Bearing Precautions Per Order Yes   RLE Weight Bearing Per Order WBAT   LLE Weight Bearing Per Order NWB   Other Precautions WBS; Fall Risk;Pain;Multiple lines   ADL   Where Assessed Chair   Grooming Assistance 5  Supervision/Setup   Grooming Deficit Setup;Verbal cueing;Supervision/safety; Increased time to complete;Wash/dry face;Brushing hair   Grooming Comments increased time to complete   LB Dressing Assistance 2  Maximal Assistance   LB Dressing Deficit Setup;Steadying; Requires assistive device for steadying;Verbal cueing; Increased time to complete;Supervision/safety; Don/doff R sock   LB Dressing Comments impaired functional reach   [address]  2  Maximal Assistance   Toileting Deficit Setup;Verbal cueing;Supervison/safety; Increased time to complete;Clothing management up;Clothing management down;Perineal hygiene; Bedside commode   Toileting Comments assist steadying in stance and for hygiene management w/ cues to maintain NWB L LE   Transfers   Sit to Stand 4  Minimal assistance   Additional items Assist x 2; Increased time required;Verbal cues; Bedrails  (assist w/ maintaining NWB L LE)   Stand to Sit 4  Minimal assistance   Additional items Assist x 2; Increased time required;Verbal cues;Armrests  (cues for L LE NWB)   Stand pivot 4  Minimal assistance   Additional items Assist x 2; Increased time required;Verbal cues;Armrests  (pt unable to maintain NWB L LE)   Sliding Board transfer 3  Moderate assistance   Additional items Assist x 2; Increased time required;Verbal cues; Bedrails;Armrests  (min assist x2 bed>w/c; mod assist x2 w/c>bed)   Toilet transfer 4  Minimal assistance   Additional items Assist x 2; Increased time required;Verbal cues; Commode   Additional Comments cues for hand placement and positioning, unable to maintain NWB L LE w/ sit<>stand transfers   Subjective   Subjective "I am worried about my procedure today" emotional support provided   Cognition   Overall Cognitive Status [facility]   Arousal/Participation Alert; Cooperative   Attention Within functional limits   Orientation Level Oriented to person;Oriented to place;Oriented to time   Memory Within functional limits   Following Commands Follows one step commands without difficulty   Comments engages in appropriate conversations, pleasant cooperative   Additional Activities   Additional Activities   (education on safety and maintain NWB LLE)   Additional Activities Comments pt receptive   Activity Tolerance   Activity Tolerance Patient limited by fatigue;Patient limited by pain;Treatment limited secondary to medical complications (Comment)   Medical Staff Made Aware appropriate to see per Uma HERNÁNDEZ   Assessment   Assessment Pt seen for skilled OT session focused on ADLs, functional transfers and mobility  Pt seated EOB setup grooming tasks  Pt requested to utilize [facility]  Pt w/ MIN assist x2 sit<>stand w/ assist of 2nd for maintaining NWB L LE during transfer   Pt w/ MIN assist x2 sit<>stand and max assist toileting hygiene in stance w/ RW support and able to maintain NWB L LE w/ static stance  Pt w/ MIN assist x2 functional transfer from [facility] to bed and unable to maintain NWB L LE  Pt then utilized sliding board for functional transfer w/ MIN assist x2 from bed to w/c w/ assist to position board  Pt w/ MIN assist x1 w/ cues for safety for w/c mobility in homelike environment; pt reported fatigue in b/l UEs w/ task  Pt w/ MOD assist x2 sliding board transfer from w/c to bed w/ increased time to complete and assist, able to maintain NWB L LE  Pt seated EOB end of session w/ all needs met  Pt continues to be limited due to NWB L LE, increased pain, impaired balance, decreased strength and endurance, increased fatigue, cues for pursed lip breathing and SPO2 on room air 90-95% t/o session all causing a decline in ADLs, functional transfers and mobility  Recommend STR when medically stable  Will continue to follow  The patient's raw score on the AM-PAC Daily Activity inpatient short form is 16, standardized score is 35 96, less than 39 4  Patients at this level are likely to benefit from discharge to post-acute rehabilitation services  Please refer to the recommendation of the Occupational Therapist for safe discharge planning  Plan   Treatment Interventions ADL retraining;UE strengthening/ROM; Functional transfer training;Cognitive reorientation; Endurance training;Patient/family training;Equipment evaluation/education; Compensatory technique education; Energy conservation; Activityengagement   Goal Expiration Date 11/08/22   OT Treatment Day 2   OT Frequency 3-5x/wk   Recommendation   OT Discharge Recommendation [facility] acute rehabilitation services   [facility]   Lower Body Dressing 2   Bathing 2   Toileting 2   Upper Body Dressing 3   Grooming 3   Eating 4   Daily Activity Raw Score 16   Daily Activity Standardized Score (Calc for Raw Score >=11) 35 96   AM-PAC Applied Cognition Inpatient   Following a Speech/Presentation 4   Understanding Ordinary Conversation 4   Taking Medications 3   Remembering Where Things Are Placed or Put Away 4   Remembering List of 4-5 Errands 4   Taking Care of Complicated Tasks 4   Applied Cognition Raw Score 23   Applied Cognition Standardized Score 53 08        Documentation completed by: Naz Gongora MS, OTR/L

## 2022-10-31 NOTE — BRIEF OP NOTE (RAD/CATH)
INTERVENTIONAL RADIOLOGY PROCEDURE NOTE    Date: 10/31/2022    Procedure: Procedure name not found  Preoperative diagnosis:   1  PAD (peripheral artery disease) ([facility])    2  UTI (urinary tract infection)    3  Hypertension    4  Open wound of foot, complicated, left, initial encounter    5  Hypokalemia    6  OREILLY (dyspnea on exertion)    7  Necrotic toes ([address] )    8  Sepsis ([address] )    9  Non healing left heel wound         Postoperative diagnosis: Same  Surgeon: Manjinder Santos     Assistant: None  No qualified resident was available  Blood loss: Minimal    Specimens: None     Findings:    Right common femoral arterial access: Successful ProGlide closure  Right:    > 50% focal calcific stenosis of the common iliac artery  - 7mm x 17mm Express stent placement    - No significant residual stenosis post intervention  Left:    2 vessel runoff via AT and peroneal that was preserved after intervention, with increased rate of flow  PT occluded proximally without distal reconstitution  Proximal SFA focal stenosis secondary to eccentric plaque  - lesion persisted after several rounds of shockwave lithotripsy  - 6mm x 6cm Rosalinda stent placement, post dilated to 5mm  - No significant residual stenosis post intervention  Distal SFA focal stenosis due to eccentric calcific plaque  - minimal improved after several rounds of shockwave lithotripsy  - irregular dissection appeared after shockwave  - 6mm x 10cm Rosalinda stent placement, post dilated to 5mm  - No significant residual stenosis post intervention  Complications: None immediate  Anesthesia: conscious sedation                 Vascular Quality Initiative - Peripheral Vascular Intervention     Urgency: Urgent    Functional Status:  Capable of only limited self-care, confined to bed or chair 50% or more of waking hours     Ambulation: Amb w/ assistance = ambulation requires use of cane, walker, person, etc    Leg Symptoms    Right: Mild Claudication:  ischemic limb muscle pain that does not limit walking or limits walking only after > 2 blocks (>600 feet or 2 football fields)  Left: Ulcer/necrosis (gangrene): de candice tissue loss due to peripheral arterial disease, not due to non-healing prior amputation       Tissue Loss Severity: Grade 2, Deep = deeper full thickness ulcer or necrosis (gangrene) on distal leg or foot with exposed bone, joint, or tendon, or shallow heel ulcer without involvement of the calcaneus (ie, major tissue loss: salvageable with 3 digital amputations or standard transmetatarsal amputation [TMA] plus skin coverage)  Infection: Grade 0, None = No symptoms or signs of infection  COVID Information  COVID Symptoms Pre-Procedure: Asymptomatic    Treatment Delayed by Pandemic: None    Access   Number of Sites: 1     Access Site 1:     Side 1: Right    Site 1: Femoral Retrograde    Access Guidance 1:U/S    Largest Sheath Size 1: 6 Fr  Closure Device 1: Perclose      Number of Closure Devices: 1     Closure Device Outcome: Closure device successful         Procedure  Fluoro Time: 34 7 minutes  Contrast Volume: Visipaque 180 ml  DAP: 68142 Gy cm2  CO2: no  Anticoagulant: Heparin  Protamine: No  If Creatinine is > 1 2 or missing, SANTOS Prophylaxis IV saline     Treatment Details  Indication: Occlusive Disease,    Completion Assessment  Artery 1 treated: SFA  proximal      Left               Outflow: AT,PT,Peroneal: 2                  Was this Site previously treated?: No          TASC Grade: A          Total Treated Length: 6 cm          Total Occluded Length: 0 cm          Calcification: Mild (calcification on one side of artery > half length of lesion)          Number of Treatment types (Devices):    2           Device 1          Treatment Type: Special Balloon,  Lithoplasty Balloon                Diameter: 5 mm          Length: 6 cm         Device 2          Treatment Type: Stent,  Drug Eluting Stent Diameter: 6 mm          Length: 6 cm            Concomitant: None          Technical result: Successful (stenosis <=30%)      Artery 2 treated: SFA   distal     Left               Outflow: AT,PT,Peroneal: 2                  Was this Site previously treated?: No          TASC Grade: A          Total Treated Length: 10 cm           Total Occluded Length: 0 cm          Calcification: Mild (calcification on one side of artery > half length of lesion)          Number of Treatment types (Devices): 2           Device 1          Treatment Type: Special Balloon,  Lithoplasty Balloon                Diameter: 5 mm          Length: 6 cm         Device 2          Treatment Type: Stent,  Drug Eluting Stent                Diameter: 6 mm          Length: 10 cm            Concomitant: None          Technical result: Successful (stenosis <=30%)      Artery 3 treated: Common Iliac  Right               Outflow: SFA, PROF, POP: Not Imaged                  Was this Site previously treated?: No          TASC Grade: A          Total Treated Length: 2 cm           Total Occluded Length: 0 cm          Calcification: Mild (calcification on one side of artery > half length of lesion)          Number of Treatment types (Devices):   1           Device 1          Treatment Type: Stent,  Bare Metal Stent                Diameter: 7 mm          Length: 17 mm            Concomitant: None          Technical result: Successful (stenosis <=30%)      None       Post Procedure  Patient currently taking: Statin, Yes      Antiplatelet Medication, Yes    Procedure Complications: Yes     Complications:     Increased O2 requirement post procedure  Presumed fluid overload due to renal prep, intraprocedural fluids/contrast, lying flat  Patient upgraded from floor to stepdown level 2

## 2022-10-31 NOTE — ASSESSMENT & PLAN NOTE
· Toprol changed to carvedilol with good response    · Continue lisinopril 40 mg and amlodipine 10 mg daily

## 2022-10-31 NOTE — PHYSICAL THERAPY NOTE
PHYSICAL THERAPY NOTE          Patient Name: Suadrshan Stafford  ZYLQU'V Date: 10/31/2022    10/31/22 0935   Note Type   Note Type Treatment   Pain Assessment   Pain Assessment Tool 0-10   Pain Score 6   Pain Location/Orientation Orientation: Left; Location: [facility] Pain Intervention(s) Repositioned; Ambulation/increased activity; Emotional support   Restrictions/Precautions   RLE Weight Bearing Per Order WBAT   LLE Weight Bearing Per Order NWB   Other Precautions WBS; Fall Risk;Pain;Multiple lines   General   Chart Reviewed Yes   Family/Caregiver Present No   Cognition   Overall Cognitive Status WFL   Arousal/Participation Alert; Cooperative   Attention Within functional limits   Orientation Level Oriented to person;Oriented to time;Oriented to place   Memory Within functional limits   Following Commands Follows one step commands without difficulty   Subjective   Subjective I a little anxious because I am going to be having a test done later  Bed Mobility   Additional Comments pt rec'd seated at EOB and remained seated at EOB post PT session  Transfers   Sit to Stand 4  Minimal assistance   Additional items Assist x 2; Increased time required;Verbal cues; Bedrails  (cues and assist to maintain NWB to Lle )   Stand to Sit 4  Minimal assistance   Additional items Assist x 2; Increased time required;Verbal cues  (cues for NWB to L le )   Stand pivot 4  Minimal assistance   Additional items Assist x 2; Increased time required;Verbal cues  (pt unable to maitain NWB to L le  transer bed<--> BSC)   Sliding Board transfer 4  Minimal assistance   Additional items Assist x 2; Increased time required;Verbal cues;Armrests; Bedrails  (min assist x2 from bed ---> w/c mod assist x2 from w/c -->bed )   Toilet transfer 4  Minimal assistance   Additional items Assist x 2;Armrests; Increased time required;Verbal cues; Commode   Additional Comments cues for hand placement,  and positioning especially with SB transfers, and postioning of L le with all transfers, cues to maintain NWB to lle  pt  unable to maitnain NWB to LLe with sit o stand and stand pivot transfers, pt able to maintain with stand to sit and SB transfers   Wheelchair Activities   Wheelchair Parts Management Yes   Right Armrest Level of Assistance Dependent;Maximum verbal cues   Left Leg Rest Level of Assistance Dependent;Maximum verbal cues   Left Brakes Level of Assistance Close supervision;Maximum verbal cues   Right Brakes Level of Assistance Close supervision;Maximum verbal cues   Propulsion Yes   Propulsion Type 1 Manual   Level 1 Level tile   Method 1 Right upper extremity; Left upper extremity   Level of Assistance 1 Close supervision;Maximum verbal cues  (hand placement for propulsion and momentum, L and r turne and u turns,  obstacle avoindence,)   Description/ Details 1 pt  performed w/c mobility with kolby of b/l ue's , propelling w/c in forward, backward direction, making l and r turns and u turns and then turning in small tighter spaces and aligning and parking wc  next to bed for transfer from w/c--> bed  pt  locks and unlocks brakes with clsoe supervison with max cues and remindes to do so  totl assit for management of R arm rest and L leg rest   cue for proper hand placement of wheels of w/c for sufficient propulsion and momentum  total assist o place an remove sliding board under pt  Balance   Static Sitting Good   Dynamic Sitting Fair +   Static Standing Fair -   Dynamic Standing Poor +   Endurance Deficit   Endurance Deficit Description ue fatigue   Activity Tolerance   Activity Tolerance Patient limited by fatigue   Equipment Use   Comments w/c mobility and management training, transfer training with use of Rw and  S/b  and pt education on WBS, ,   Assessment   Prognosis Fair   Problem List Decreased strength;Decreased endurance; Impaired balance;Decreased mobility; Decreased skin integrity;Obesity;Pain   Assessment Pt seen for PT treatment session as per PT POC  Pt  Is unable to maintain strict NWB to L le with sit to stand and stand pivot transfers despite cues and or assistance provided  Once standing pt is able to maintain NWb to L le with cues for positioning of L le and reminders  Pt  maintains NWb to L le with stand to sit transfers with cues for positioning of L le and reminders  Pt  Performs SB transfer with min assist x2 bed --> w/c and mod assist x2 from w/c--> bed as pt needing to go up incline  Pt  Requires cues for placement and positioning of hands on SB, total assist to place and remove SB under pt  Pt  Performs w/c mobility with close supervision and max cues for hand positioning for proper propulsion and momentum of w/c  Pt  Propels w/c 100' x2  With one rest break due to ue fatigue  Pt requires max cues for making l and R turns, u turn sharp turns, for obstacle avoidence, and giving enough room when going through doorways and around corners  Total assist for management of R arm res and l leg rest   Pt  Locks and unlocks wc with max cues and cues for reminders to do so  Recommend d/c to STR at given social and environmental barriers and need for further mobility training given NWB to L le  The patient's AM-PAC Basic Mobility Inpatient Short Form Raw Score is 13  A Raw score of less than or equal to 16 suggests the patient may benefit from discharge to post-acute rehabilitation services  Please also refer to the recommendation of the Physical Therapist for safe discharge planning  Goals   Patient Goals to get back to normal way of life  STG Expiration Date 11/08/22   PT Treatment Day 1   Plan   Treatment/Interventions Functional transfer training; Therapeutic exercise; Endurance training;Patient/family training;Equipment eval/education; Bed mobility;Spoke to nursing;OT;Compensatory technique education   Progress Progressing toward goals   PT Frequency 4-6x/wk Recommendation   PT Discharge Recommendation Post acute rehabilitation services   AM-PAC Basic Mobility Inpatient   Turning in Bed Without Bedrails 3   Lying on Back to Sitting on Edge of Flat Bed 3   Moving Bed to Chair 2   Standing Up From Chair 3   Walk in Room 1   Climb 3-5 Stairs 1   Basic Mobility Inpatient Raw Score 13   Basic Mobility Standardized Score 33 99   Highest Level Of Mobility   -[facility] Goal 4: Move to chair/commode   -HL Achieved 4: Move to chair/commode   Education   Education Provided Mobility training;Assistive device   Patient Reinforcement needed;Demonstrates acceptance/verbal understanding   End of Consult   Patient Position at End of Consult Seated edge of bed; All needs within reach   End of Consult Comments pt declined having L le elevated  10/31/22 0935   Note Type   Note Type Treatment   Pain Assessment   Pain Assessment Tool 0-10   Pain Score 6   Pain Location/Orientation Orientation: Left; Location: [facility]   Hospital Pain Intervention(s) Repositioned; Ambulation/increased activity; Emotional support   Restrictions/Precautions   RLE Weight Bearing Per Order WBAT   LLE Weight Bearing Per Order NWB   Other Precautions WBS; Fall Risk;Pain;Multiple lines   General   Chart Reviewed Yes   Family/Caregiver Present No   Cognition   Overall Cognitive Status WFL   Arousal/Participation Alert; Cooperative   Attention Within functional limits   Orientation Level Oriented to person;Oriented to time;Oriented to place   Memory Within functional limits   Following Commands Follows one step commands without difficulty   Subjective   Subjective I a little anxious because I am going to be having a test done later  Bed Mobility   Additional Comments pt rec'd seated at EOB and remained seated at EOB post PT session  Transfers   Sit to Stand 4  Minimal assistance   Additional items Assist x 2; Increased time required;Verbal cues; Bedrails  (cues and assist to maintain NWB to Lle )   Stand to Sit 4 Minimal assistance   Additional items Assist x 2; Increased time required;Verbal cues  (cues for NWB to L le )   Stand pivot 4  Minimal assistance   Additional items Assist x 2; Increased time required;Verbal cues  (pt unable to maitain NWB to L le  transer bed<--> BSC)   Sliding Board transfer 4  Minimal assistance   Additional items Assist x 2; Increased time required;Verbal cues;Armrests; Bedrails  (min assist x2 from bed ---> w/c mod assist x2 from w/c -->bed )   Toilet transfer 4  Minimal assistance   Additional items Assist x 2;Armrests; Increased time required;Verbal cues; Commode   Additional Comments cues for hand placement,  and positioning especially with SB transfers, and postioning of L le with all transfers, cues to maintain NWB to lle  pt  unable to maitnain NWB to LLe with sit o stand and stand pivot transfers, pt able to maintain with stand to sit and SB transfers   Wheelchair Activities   Wheelchair Parts Management Yes   Right Armrest Level of Assistance Dependent;Maximum verbal cues   Left Leg Rest Level of Assistance Dependent;Maximum verbal cues   Left Brakes Level of Assistance Close supervision;Maximum verbal cues   Right Brakes Level of Assistance Close supervision;Maximum verbal cues   Propulsion Yes   Propulsion Type 1 Manual   Level 1 Level tile   Method 1 Right upper extremity; Left upper extremity   Level of Assistance 1 Close supervision;Maximum verbal cues  (hand placement for propulsion and momentum, L and r turne and u turns,  obstacle avoindence,)   Description/ Details 1 pt  performed w/c mobility with kolby of b/l ue's , propelling w/c in forward, backward direction, making l and r turns and u turns and then turning in small tighter spaces and aligning and parking wc  next to bed for transfer from w/c--> bed  pt  locks and unlocks brakes with clsoe supervison with max cues and remindes to do so    totl assit for management of R arm rest and L leg rest   cue for proper hand placement of wheels of w/c for sufficient propulsion and momentum  total assist o place an remove sliding board under pt  Balance   Static Sitting Good   Dynamic Sitting Fair +   Static Standing Fair -   Dynamic Standing Poor +   Endurance Deficit   Endurance Deficit Description ue fatigue   Activity Tolerance   Activity Tolerance Patient limited by fatigue   Equipment Use   Comments w/c mobility and management training, transfer training with use of Rw and  S/b  and pt education on WBS, ,   Assessment   Prognosis Fair   Problem List Decreased strength;Decreased endurance; Impaired balance;Decreased mobility; Decreased skin integrity;Obesity;Pain   Assessment Pt seen for PT treatment session as per PT POC  Pt  Is unable to maintain strict NWB to L le with sit to stand and stand pivot transfers despite cues and or assistance provided  Once standing pt is able to maintain NWb to L le with cues for positioning of L le and reminders  Pt  maintains NWb to L le with stand to sit transfers with cues for positioning of L le and reminders  Pt  Performs SB transfer with min assist x2 bed --> w/c and mod assist x2 from w/c--> bed as pt needing to go up incline  Pt  Requires cues for placement and positioning of hands on SB, total assist to place and remove SB under pt  Pt  Performs w/c mobility with close supervision and max cues for hand positioning for proper propulsion and momentum of w/c  Pt  Propels w/c 100' x2  With one rest break due to ue fatigue  Pt requires max cues for making l and R turns, u turn sharp turns, for obstacle avoidence, and giving enough room when going through doorways and around corners  Total assist for management of R arm res and l leg rest   Pt  Locks and unlocks wc with max cues and cues for reminders to do so  Recommend d/c to STR at given social and environmental barriers and need for further mobility training given NWB to L le    The patient's AM-PAC Basic Mobility Inpatient Short Form Raw Score is 13  A Raw score of less than or equal to 16 suggests the patient may benefit from discharge to post-acute rehabilitation services  Please also refer to the recommendation of the Physical Therapist for safe discharge planning  Goals   Patient Goals to get back to normal way of life  STG Expiration Date 11/08/22   PT Treatment Day 1   Plan   Treatment/Interventions Functional transfer training; Therapeutic exercise; Endurance training;Patient/family training;Equipment eval/education; Bed mobility;Spoke to nursing;OT;Compensatory technique education   Progress Progressing toward goals   PT Frequency 4-6x/wk   Recommendation   PT Discharge Recommendation Post acute rehabilitation services   AM-PAC Basic Mobility Inpatient   Turning in Bed Without Bedrails 3   Lying on Back to Sitting on Edge of Flat Bed 3   Moving Bed to Chair 2   Standing Up From Chair 3   Walk in Room 1   Climb 3-5 Stairs 1   Basic Mobility Inpatient Raw Score 13   Basic Mobility Standardized Score 33 99   Highest Level Of Mobility   [facility] Goal 4: Move to chair/commode   [facility]-HLM Achieved 4: Move to chair/commode   Education   Education Provided Mobility training;Assistive device   Patient Reinforcement needed;Demonstrates acceptance/verbal understanding   End of Consult   Patient Position at End of Consult Seated edge of bed; All needs within reach   End of Consult Comments pt declined having L le elevated     Srinivasa Ink

## 2022-10-31 NOTE — PLAN OF CARE
Problem: PHYSICAL THERAPY ADULT  Goal: Performs mobility at highest level of function for planned discharge setting  See evaluation for individualized goals  Description: Treatment/Interventions: Functional transfer training, LE strengthening/ROM, Therapeutic exercise, Endurance training, Patient/family training, Equipment eval/education, Bed mobility, Compensatory technique education, Gait training, Continued evaluation, Spoke to nursing, Spoke to MD, Spoke to case management, OT          See flowsheet documentation for full assessment, interventions and recommendations  Outcome: Progressing  Note: Prognosis: Fair  Problem List: Decreased strength, Decreased endurance, Impaired balance, Decreased mobility, Decreased skin integrity, Obesity, Pain  Assessment: Pt seen for PT treatment session as per PT POC  Pt  Is unable to maintain strict NWB to L le with sit to stand and stand pivot transfers despite cues and or assistance provided  Once standing pt is able to maintain NWb to L le with cues for positioning of L le and reminders  Pt  maintains NWb to L le with stand to sit transfers with cues for positioning of L le and reminders  Pt  Performs SB transfer with min assist x2 bed --> w/c and mod assist x2 from w/c--> bed as pt needing to go up incline  Pt  Requires cues for placement and positioning of hands on SB, total assist to place and remove SB under pt  Pt  Performs w/c mobility with close supervision and max cues for hand positioning for proper propulsion and momentum of w/c  Pt  Propels w/c 100' x2  With one rest break due to ue fatigue  Pt requires max cues for making l and R turns, u turn sharp turns, for obstacle avoidence, and giving enough room when going through doorways and around corners  Total assist for management of R arm res and l leg rest   Pt  Locks and unlocks wc with max cues and cues for reminders to do so    Recommend d/c to STR at given social and environmental barriers and need for further mobility training given NWB to L le  The patient's AM-PAC Basic Mobility Inpatient Short Form Raw Score is 13  A Raw score of less than or equal to 16 suggests the patient may benefit from discharge to post-acute rehabilitation services  Please also refer to the recommendation of the Physical Therapist for safe discharge planning  Barriers to Discharge: Inaccessible home environment, Decreased caregiver support  Barriers to Discharge Comments: lives alone, stairs  PT Discharge Recommendation: Post acute rehabilitation services    See flowsheet documentation for full assessment

## 2022-10-31 NOTE — SEDATION DOCUMENTATION
Report to Renee Webster who will be taking patient in to room 221/2  Will be ready to receive patient  Patient was transported to new room on continous cardiac monitoring, o2 via nc  BNP and troponin were obtained and sent to lab

## 2022-10-31 NOTE — PLAN OF CARE
Problem: OCCUPATIONAL THERAPY ADULT  Goal: Performs self-care activities at highest level of function for planned discharge setting  See evaluation for individualized goals  Description: Treatment Interventions: ADL retraining, Functional transfer training, UE strengthening/ROM, Endurance training, Cognitive reorientation, Patient/family training, Equipment evaluation/education, Compensatory technique education, Energy conservation, Activityengagement          See flowsheet documentation for full assessment, interventions and recommendations  Outcome: Progressing  Note: Limitation: Decreased ADL status, Decreased UE strength, Decreased Safe judgement during ADL, Decreased endurance, Decreased high-level ADLs  Prognosis: Good  Assessment: Pt seen for skilled OT session focused on ADLs, functional transfers and mobility  Pt seated EOB setup grooming tasks  Pt requested to utilize [facility]  Pt w/ MIN assist x2 sit<>stand w/ assist of 2nd for maintaining NWB L LE during transfer  Pt w/ MIN assist x2 sit<>stand and max assist toileting hygiene in stance w/ RW support and able to maintain NWB L LE w/ static stance  Pt w/ MIN assist x2 functional transfer from [facility] to bed and unable to maintain NWB L LE  Pt then utilized sliding board for functional transfer w/ MIN assist x2 from bed to w/c w/ assist to position board  Pt w/ MIN assist x1 w/ cues for safety for w/c mobility in homelike environment; pt reported fatigue in b/l UEs w/ task  Pt w/ MOD assist x2 sliding board transfer from w/c to bed w/ increased time to complete and assist, able to maintain NWB L LE  Pt seated EOB end of session w/ all needs met  Pt continues to be limited due to NWB L LE, increased pain, impaired balance, decreased strength and endurance, increased fatigue, cues for pursed lip breathing and SPO2 on room air 90-95% t/o session all causing a decline in ADLs, functional transfers and mobility  Recommend STR when medically stable   Will continue to follow  The patient's raw score on the AM-PAC Daily Activity inpatient short form is 16, standardized score is 35 96, less than 39 4  Patients at this level are likely to benefit from discharge to post-acute rehabilitation services  Please refer to the recommendation of the Occupational Therapist for safe discharge planning       OT Discharge Recommendation: Post acute rehabilitation services

## 2022-10-31 NOTE — ASSESSMENT & PLAN NOTE
Lab Results   Component Value Date    HGBA1C 9 8 (H) 10/25/2022     Recent Labs     10/30/22  2103 10/31/22  0658 10/31/22  0709 10/31/22  1040   POCGLU 164* 104 105 101     · Uncontrolled with A1C at 9 8  · Blood glucose controlled on lantus 30 units daily  · Continue insulin sliding scale

## 2022-10-31 NOTE — PLAN OF CARE
Problem: Potential for Falls  Goal: Patient will remain free of falls  Description: INTERVENTIONS:  - Instruct patient to call for assistance with activity   - Keep Call bell within reach  - Keep bed low and locked with side rails adjusted as appropriate  - Keep care items and personal belongings within reach  - Initiate and maintain comfort rounds  - Make Fall Risk Sign visible to staff  - Offer Toileting every 2 Hours, in advance of need  - Initiate/Maintain bed  chair alarm  - Obtain necessary fall risk management equipment: bed chair alarm, call bell, gripper sock, walker, bedside commode  - Apply yellow socks and bracelet for high fall risk patients  - Consider moving patient to room near nurses station  Outcome: Progressing     Problem: PAIN - ADULT  Goal: Verbalizes/displays adequate comfort level or baseline comfort level  Description: Interventions:  - Encourage patient to monitor pain and request assistance  - Assess pain using appropriate pain scale  - Administer analgesics based on type and severity of pain and evaluate response  - Implement non-pharmacological measures as appropriate and evaluate response  - Consider cultural and social influences on pain and pain management  - Notify physician/advanced practitioner if interventions unsuccessful or patient reports new pain  Outcome: Progressing       Problem: METABOLIC, FLUID AND ELECTROLYTES - ADULT  Goal: Electrolytes maintained within normal limits  Description: INTERVENTIONS:  - Monitor labs and assess patient for signs and symptoms of electrolyte imbalances  - Administer electrolyte replacement as ordered  - Monitor response to electrolyte replacements, including repeat lab results as appropriate  - Instruct patient on fluid and nutrition as appropriate  Outcome: Progressing  Goal: Fluid balance maintained  Description: INTERVENTIONS:  - Monitor labs   - Monitor I/O and WT  - Instruct patient on fluid and nutrition as appropriate  - Assess for signs & symptoms of volume excess or deficit  Outcome: Progressing     Problem: RESPIRATORY - ADULT  Goal: Achieves optimal ventilation and oxygenation  Description: INTERVENTIONS:  - Assess for changes in respiratory status  - Assess for changes in mentation and behavior  - Position to facilitate oxygenation and minimize respiratory effort  - Oxygen administered by appropriate delivery if ordered  - Initiate smoking cessation education as indicated  - Encourage broncho-pulmonary hygiene including cough, deep breathe, Incentive Spirometry  - Assess the need for suctioning and aspirate as needed  - Assess and instruct to report SOB or any respiratory difficulty  - Respiratory Therapy support as indicated  Outcome: Progressing

## 2022-10-31 NOTE — PLAN OF CARE
Problem: PAIN - ADULT  Goal: Verbalizes/displays adequate comfort level or baseline comfort level  Description: Interventions:  - Encourage patient to monitor pain and request assistance  - Assess pain using appropriate pain scale  - Administer analgesics based on type and severity of pain and evaluate response  - Implement non-pharmacological measures as appropriate and evaluate response  - Consider cultural and social influences on pain and pain management  - Notify physician/advanced practitioner if interventions unsuccessful or patient reports new pain  Outcome: Progressing     Problem: INFECTION - ADULT  Goal: Absence or prevention of progression during hospitalization  Description: INTERVENTIONS:  - Assess and monitor for signs and symptoms of infection  - Monitor lab/diagnostic results  - Monitor all insertion sites, i e  indwelling lines, tubes, and drains  - Administer medications as ordered  - Instruct and encourage patient and family to use good hand hygiene technique  Outcome: Progressing     Problem: Knowledge Deficit  Goal: Patient/family/caregiver demonstrates understanding of disease process, treatment plan, medications, and discharge instructions  Description: Complete learning assessment and assess knowledge base    Interventions:  - Provide teaching at level of understanding  - Provide teaching via preferred learning methods  Outcome: Progressing     Problem: DISCHARGE PLANNING  Goal: Discharge to home or other facility with appropriate resources  Description: INTERVENTIONS:  - Identify barriers to discharge w/patient   - Arrange for needed discharge resources and transportation as appropriate  - Identify discharge learning needs  - Refer to Case Management Department for coordinating discharge planning if the patient needs post-hospital services based on physician/advanced practitioner order   Outcome: Progressing

## 2022-10-31 NOTE — PROGRESS NOTES
Progress Note - Infectious Disease   Jessica Dubon 65 y o  female MRN: [id number]  Unit/Bed#: E2 -01 Encounter: [id number]      Impression/Plan:  1  Sepsis-POA  Tachycardia and leukocytosis   Suspect was secondary to UTI   No overt evidence of a soft tissue infection involving the heel   The patient was not bacteremic  Perico Session has clinically improved and has completed a more than week course of intravenous antibiotics  Patient is now stable off all antibiotics  -monitor off all antibiotics  -supportive care     2  E coli urinary tract infection-status post more than a week of intravenous antibiotics with resolution of her symptomatology  -no additional ID treatment or workup for now     3  Left heel eschar-without any purulent drainage or surrounding cellulitis to suggest an overt soft tissue infection   Consideration for the possibility of a deeper infection although MRI does not reveal any obvious osteomyelitis  CT angiogram with potentially significant PD  -vascular workup underway  -await angiogram with possible intervention  -serial exams  -local wound care     4  Diabetes mellitus-type 2 with hyperglycemia with hemoglobin A1c of 9 8    Have discussed the above management plan with the primary service    No active infectious disease issues  We will sign off  Please call if questions  Antibiotics:  None status post 8 days of Zosyn    Subjective:  Patient has no fever, chills, sweats; no nausea, vomiting, diarrhea; no cough, shortness of breath; no increased pain  No new symptoms  Objective:  Vitals:  Temp:  [97 3 °F (36 3 °C)-98 5 °F (36 9 °C)] 97 3 °F (36 3 °C)  HR:  [82-87] 86  Resp:  [18-24] 18  BP: (136-153)/(67-80) 153/72  SpO2:  [88 %-95 %] 90 %  Temp (24hrs), Av 8 °F (36 6 °C), Min:97 3 °F (36 3 °C), Max:98 5 °F (36 9 °C)  Current: Temperature: (!) 97 3 °F (36 3 °C)    Physical Exam:   General Appearance:  Alert, interactive, nontoxic, no acute distress     Throat: Oropharynx moist without lesions  Lungs:   Clear to auscultation bilaterally; no wheezes, rhonchi or rales; respirations unlabored   Heart:  RRR; no murmur, rub or gallop   Abdomen:   Soft, non-tender, non-distended, positive bowel sounds  Extremities: No clubbing, cyanosis  Left foot dressed with a dry dressing in place   Skin: No new rashes or lesions  No draining wounds noted         Labs, Imaging, & Other studies:   All pertinent labs and imaging studies were personally reviewed  Results from last 7 days   Lab Units 10/31/22  0536 10/30/22  0504 10/29/22  0537   WBC Thousand/uL 12 59* 12 90* 5 95   HEMOGLOBIN g/dL 7 8* 7 8* 15 2   PLATELETS Thousands/uL 371 368 273     Results from last 7 days   Lab Units 10/31/22  0536 10/30/22  0504 10/29/22  0537   SODIUM mmol/L 138 137 140   POTASSIUM mmol/L 4 4 4 5 4 3   CHLORIDE mmol/L 103 103 105   CO2 mmol/L 28 28 26   BUN mg/dL 20 20 17   CREATININE mg/dL 1 38* 1 39* 1 28   EGFR ml/min/1 73sq m 41 40 45   CALCIUM mg/dL 9 4 9 2 9 2                 Results from last 7 days   Lab Units 10/25/22  0524   FERRITIN ng/mL 60

## 2022-10-31 NOTE — QUICK NOTE
Reported the patient is status post IR procedure angiogram requiring angioplasty without stent  Found to be hypoxic requiring non-rebreather with 10 L to maintain oxygen saturations above 90  Increasing respiratory distress  Patient on exam does examined to be volume overloaded with significant edema of lower extremity  IV fluids were discontinue      Will order chest x-ray, troponin and give IV Lasix 40 mg    Will upgrade patient to step-down level 2

## 2022-10-31 NOTE — ASSESSMENT & PLAN NOTE
· In setting of uncontrolled diabetes and known PAD  · Arterial duplex reviewed, "Great toe pressure of 35 mm Hg, below healing threshold for a diabetic"  · MRI of the foot noted for "a large plantar lateral heel ulcer, without underlying soft tissue abscess  · Received Zosyn x7, no further abx per ID  · Plan for arteriogram later today  · Continue aspirin, statin  · Appreciate podiatry and vascular surgery input

## 2022-10-31 NOTE — PROGRESS NOTES
NEPHROLOGY PROGRESS NOTE   Ami Encarnacion 65 y o  female MRN: [id number]  Unit/Bed#: E2 -01 Encounter: [id number]      HPI/24hr EVENTS:    -71-year-old female past medical history of DM 2, hypertension  Presented with nonhealing wound of left foot, Nephrology consulted for management of NYDIA and risk reduction in the setting of contrast studies  -renal function unchanged, received IV fluids starting at midnight    ASSESSMENT/PLAN:    NYDAI  -Baseline creatinine:  0 7-1 1 since 2020  -Creatinine on admission 1 1, creatinine peaked at 1 44 in 10/24 that improved to 1 1 on 10/26, secondary rise 10/30 with creatinine to 1 39, most recent creatinine 1 38  -Etiology:  Suspect multifactorial due to septic ATN,/contrast induced nephropathy (CTA 10/27), compensatory inhibition with ACE-inhibitor use, diuretic use  -UA:  250 glucose, large blood, 3+ protein, innumerable RBCs/WBCs/bacteria  -Renal imaging:  -Avoid hypotension, avoid nephrotoxins, avoid NSAIDS  -Trend BMP    Proteinuria  -prior UAs as far back as 2019 with 3+ protein  -should have a urine protein creatinine ratio/microalbumin to creatinine ratio performed as outpatient in steady state    Hypertension  -home regimen Norvasc 10 mg daily, Toprol-XL 25 mg daily  , lisinopril 40 mg daily,  -currently on Norvasc 10 mg daily, Coreg 25 mg b i d   -recent blood pressure trends acceptable    Anemia  -recent hemoglobin 7 8  -recommend transfuse for goal greater than 7 per primary team  -IV iron contraindicated the setting of active infection    Left foot wound  -Podiatry and vascular following  -ID consulted for antibiotic management, currently monitoring off antibiotics  -10/27 CTA with diffuse disease noted  -planned aortogram today, has IV fluids ordered 2 hours pre, 4 hours post contrast administration    Lower extremity  -has been receiving intermittent diuretics  -ideally would hold diuretics for today with pending dye exposure    Addition medical problems: Dm 2 last A1c 9 8, morbid obesity    SUBJECTIVE:  Patient reports he slept well overnight, reports she has been using pain medication to manage her left foot pain    ROS:  Review of Systems   Constitutional: Negative  Respiratory: Negative for shortness of breath  Cardiovascular: Positive for leg swelling  Genitourinary: Negative  Musculoskeletal: Negative  Left foot pain   Neurological: Negative  A complete 10 point review of systems was performed and found to be negative unless otherwise noted above or in the HPI  OBJECTIVE:  Current Weight: Weight - Scale: 114 kg (251 lb 12 3 oz)  Vitals:    10/30/22 2040 10/31/22 0537 10/31/22 0700 10/31/22 0826   BP: 153/67  153/72 139/62   BP Location: Right arm  Right arm Right arm   Pulse: 82  86 77   Resp: 20  18    Temp: 97 6 °F (36 4 °C)  (!) 97 3 °F (36 3 °C)    TempSrc:   Temporal    SpO2: 95%  90%    Weight:  114 kg (251 lb 12 3 oz)     Height:           Intake/Output Summary (Last 24 hours) at 10/31/2022 1034  Last data filed at 10/31/2022 0900  Gross per 24 hour   Intake 290 ml   Output 350 ml   Net -60 ml     Physical Exam  Vitals and nursing note reviewed  Constitutional:       General: She is not in acute distress  Appearance: Normal appearance  She is obese  She is not toxic-appearing or diaphoretic  HENT:      Head: Normocephalic and atraumatic  Nose: Nose normal       Mouth/Throat:      Mouth: Mucous membranes are moist    Eyes:      General: No scleral icterus  Cardiovascular:      Rate and Rhythm: Normal rate and regular rhythm  Pulses: Normal pulses  Pulmonary:      Effort: Pulmonary effort is normal  No respiratory distress  Breath sounds: Normal breath sounds  No stridor  No wheezing or rales  Abdominal:      General: Abdomen is flat  There is no distension  Palpations: Abdomen is soft  Tenderness: There is no abdominal tenderness  Musculoskeletal:      Cervical back: Neck supple  Right lower leg: Edema present  Left lower leg: Edema present  Comments: Left foot dressing   Skin:     Capillary Refill: Capillary refill takes less than 2 seconds  Neurological:      General: No focal deficit present  Mental Status: She is alert and oriented to person, place, and time     Psychiatric:         Mood and Affect: Mood normal           Medications:    Current Facility-Administered Medications:   •  acetaminophen (TYLENOL) tablet 650 mg, 650 mg, Oral, Q6H PRN, Sugey Wiseman PA-C, 650 mg at 10/23/22 1751  •  albuterol (PROVENTIL HFA,VENTOLIN HFA) inhaler 2 puff, 2 puff, Inhalation, Q4H PRN, Amy Francisco MD, 2 puff at 10/30/22 0611  •  amLODIPine (NORVASC) tablet 10 mg, 10 mg, Oral, Daily, ALEX Tracy, 10 mg at 10/31/22 2584  •  aspirin chewable tablet 81 mg, 81 mg, Oral, Daily, Sugey Wiseman PA-C, 81 mg at 10/30/22 0845  •  atorvastatin (LIPITOR) tablet 40 mg, 40 mg, Oral, Daily With Dinner, Francisco Whitmore MD, 40 mg at 10/30/22 1618  •  carvedilol (COREG) tablet 25 mg, 25 mg, Oral, BID With Meals, ALEX Tracy, 25 mg at 10/31/22 9570  •  gabapentin (NEURONTIN) capsule 200 mg, 200 mg, Oral, BID, Sugey Wiseman PA-C, 200 mg at 10/31/22 3193  •  heparin (porcine) subcutaneous injection 5,000 Units, 5,000 Units, Subcutaneous, Q8H [address], 5,000 Units at 10/31/22 0517 **AND** [CANCELED] Platelet count, , , Once, Sugey Wiseman PA-C  •  hydrALAZINE (APRESOLINE) injection 10 mg, 10 mg, Intravenous, Q6H PRN, Sugey Wiseman PA-C, 10 mg at 10/27/22 1857  •  insulin glargine (LANTUS) subcutaneous injection 30 Units 0 3 mL, 30 Units, Subcutaneous, HS, Francisco Whitmore MD, 30 Units at 10/30/22 2233  •  insulin lispro (HumaLOG) 100 units/mL subcutaneous injection 1-6 Units, 1-6 Units, Subcutaneous, 4x Daily (AC & HS), 1 Units at 10/30/22 2234 **AND** Fingerstick Glucose (POCT), , , 4x Daily AC and at bedtime, Sugey Wiseman PA-C  •  LORazepam (ATIVAN) tablet 0 5 mg, 0 5 mg, Oral, Q8H PRN, Amy Parry MD  •  metoclopramide (REGLAN) injection 5 mg, 5 mg, Intravenous, Q6H PRN, Patel Duncan DO, 5 mg at 10/23/22 5873  •  morphine injection 4 mg, 4 mg, Intravenous, Q4H PRN, Renato Webb DO, 4 mg at 10/30/22 0923  •  ondansetron (ZOFRAN) injection 4 mg, 4 mg, Intravenous, Q6H PRN, Sugey Wiseman PA-C, 4 mg at 10/22/22 2241  •  oxyCODONE (ROXICODONE) IR tablet 5 mg, 5 mg, Oral, Q4H PRN, 5 mg at 10/31/22 0634 **OR** oxyCODONE (ROXICODONE) immediate release tablet 10 mg, 10 mg, Oral, Q4H PRN, Amy Parry MD  •  sertraline (ZOLOFT) tablet 100 mg, 100 mg, Oral, Daily, Sugey Wiseman PA-C, 100 mg at 10/31/22 7838  •  sodium chloride 0 9 % infusion, 50 mL/hr, Intravenous, Continuous, ALEX Saini, Last Rate: 50 mL/hr at 10/31/22 0012, 50 mL/hr at 10/31/22 0012    Laboratory Results:  Results from last 7 days   Lab Units 10/31/22  0536 10/30/22  0504 10/29/22  0537 10/28/22  0455 10/27/22  0554 10/26/22  1125 10/26/22  0617 10/25/22  0524   WBC Thousand/uL 12 59* 12 90* 5 95 13 85*  --   --  14 19* 12 49*   HEMOGLOBIN g/dL 7 8* 7 8* 15 2 8 5*  --   --  10 5* 9 0*   HEMATOCRIT % 25 7* 25 5* 48 8* 28 0*  --   --  32 9* 28 8*   PLATELETS Thousands/uL 371 368 273 359  --   --  370 329   POTASSIUM mmol/L 4 4 4 5 4 3 4 7 3 1* 3 8  --  3 2*   CHLORIDE mmol/L 103 103 105 104 107 105  --  105   CO2 mmol/L 28 28 26 27 25 27  --  25   BUN mg/dL 20 20 17 16 14 16  --  19   CREATININE mg/dL 1 38* 1 39* 1 28 1 20 1 13 1 17  --  1 43*   CALCIUM mg/dL 9 4 9 2 9 2 8 9 8 7 8 6  --  8 9   MAGNESIUM mg/dL 1 7  --   --   --   --   --   --   --    PHOSPHORUS mg/dL 5 5*  --   --   --   --   --   --   --        I have personally reviewed the blood work as stated above and in my note  I have personally reviewed internal medicine, infectious disease, vascular surgery note

## 2022-10-31 NOTE — CASE MANAGEMENT
Case Management Discharge Planning Note    Patient name Lionel Life  Location [address] [place] [address] /[address] 80-* MRN [id number]  : 1960 Date 10/31/2022       Current Admission Date: 10/21/2022  Current Admission Diagnosis:Non healing left heel wound   Patient Active Problem List    Diagnosis Date Noted   • Acute on chronic anemia 10/30/2022   • Renal insufficiency    • Generalized edema due to fluid overload 10/27/2022   • PAD (peripheral artery disease) ([address] ) 10/25/2022   • NYDIA (acute kidney injury) ([address] ) 10/25/2022   • UTI (urinary tract infection) 10/23/2022   • N&V (nausea and vomiting) 10/22/2022   • Sepsis ([address] ) 10/22/2022   • Non healing left heel wound 10/22/2022   • Abnormal EKG 10/22/2022   • Hypertensive urgency 10/22/2022   • Wound of lower extremity 10/21/2022   • Epigastric pain 2022   • Type 2 diabetes mellitus with hyperglycemia, with long-term current use of insulin ([address] ) 10/18/2021   • Hyperparathyroidism ([address] ) 10/18/2021   • 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 ([address] ) 2021   • 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   • Moderate protein-calorie malnutrition ([address] ) 2020   • Acute colitis 2020   • Arthritis 2019   • Depression, recurrent ([address] ) 2018   • Class 3 severe obesity due to excess calories with serious comorbidity and body mass index (BMI) of 40 0 to 44 9 in adult ([address] ) 2018   • Esophageal reflux 2018   • Uncontrolled type 2 diabetes with neuropathy 2018   • Diabetic peripheral neuropathy ([address] )    • Systolic murmur    • Cerebral infarction ([address] ) 2015   • Anxiety 2015   • Vitamin D deficiency 2015   • Benign essential hypertension 2012   • Familial combined hyperlipidemia 2012      LOS (days): 9  Geometric Mean LOS (GMLOS) (days):   Days to GMLOS:     OBJECTIVE:  Risk of Unplanned Readmission Score: 20 28 Current admission status: Inpatient   Preferred Pharmacy:   [id number], [address]  [address]  [place] [address]  Phone: [phone] Fax: [phone]    Primary Care Provider: ALEX Morocho    Primary Insurance: BLUE CROSS  Secondary Insurance:     DISCHARGE DETAILS:     Per MD notes, patient having LLE angiogram today  Deadline extended for STRs in [place]  CM will follow up with patient regarding accepting facilities- pending procedure  CM will continue to follow patient for discharge needs

## 2022-10-31 NOTE — SEDATION DOCUMENTATION
Patient reporting shortness of breath, spo2 into the low high 70s low 80s, simple mask placed spo2 increased to 92  Pt initially 94 on 3lpm via nc as baseline when arriving in IR  Dr Laurie Contreras aware and spoke with patient   Will contact medical team

## 2022-10-31 NOTE — PROGRESS NOTES
[address] [facility]  Progress Note - [address] [place] [address] 1960, 65 y o  female MRN: [id number]  Unit/Bed#: E2 -01 Encounter: [id number]  Primary Care Provider: ALEX Reid   Date and time admitted to hospital: 10/21/2022  7:58 PM    * Non healing left heel wound  Assessment & Plan  · In setting of uncontrolled diabetes and known PAD  · Arterial duplex reviewed, "Great toe pressure of 35 mm Hg, below healing threshold for a diabetic"  · MRI of the foot noted for "a large plantar lateral heel ulcer, without underlying soft tissue abscess  · Received Zosyn x7, no further abx per ID  · Plan for arteriogram later today  · Continue aspirin, statin  · Appreciate podiatry and vascular surgery input    Hypertensive urgency  Assessment & Plan    · Toprol changed to carvedilol with good response  · Continue lisinopril 40 mg and amlodipine 10 mg daily    Wound of lower extremity  Assessment & Plan  MRI LLE without evidence of osteomyelitis   Abx have been discontinued     Type 2 diabetes mellitus with hyperglycemia, with long-term current use of insulin [facility])  Assessment & Plan  Lab Results   Component Value Date    HGBA1C 9 8 (H) 10/25/2022     Recent Labs     10/30/22  2103 10/31/22  0658 10/31/22  0709 10/31/22  1040   POCGLU 164* 104 105 101     · Uncontrolled with A1C at 9 8  · Blood glucose controlled on lantus 30 units daily  · Continue insulin sliding scale    Benign essential hypertension  Assessment & Plan  Controlled with resumption of home medications, continue current regimen    Depression, recurrent (HCC)  Assessment & Plan  Continue sertraline, Xanax PRN        VTE Pharmacologic Prophylaxis:   Pharmacologic: Heparin  Mechanical VTE Prophylaxis in Place: Yes    Patient Centered Rounds: I have performed bedside rounds with nursing staff today      Discussions with Specialists or Other Care Team Provider: none    Education and Discussions with Family / Patient: patient    Time Spent for Care: 30 minutes  More than 50% of total time spent on counseling and coordination of care as described above  Current Length of Stay: 9 day(s)    Current Patient Status: Inpatient   Certification Statement: The patient will continue to require additional inpatient hospital stay due to pending agram later today    Discharge Plan: pending    Code Status: Level 1 - Full Code      Subjective:   No events overnight  Pain controlled  Denies CP or SOB  Awaiting procedure  Objective:     Vitals:   Temp (24hrs), Av 8 °F (36 6 °C), Min:97 3 °F (36 3 °C), Max:98 5 °F (36 9 °C)    Temp:  [97 3 °F (36 3 °C)-98 5 °F (36 9 °C)] 97 3 °F (36 3 °C)  HR:  [74-87] 76  Resp:  [14-24] 15  BP: (139-199)/(62-96) 169/94  SpO2:  [88 %-96 %] 95 %  Body mass index is 43 56 kg/m²  Input and Output Summary (last 24 hours): Intake/Output Summary (Last 24 hours) at 10/31/2022 1408  Last data filed at 10/31/2022 1221  Gross per 24 hour   Intake 290 ml   Output 1150 ml   Net -860 ml       Physical Exam:     Physical Exam  Vitals reviewed  Constitutional:       General: She is not in acute distress  Appearance: Normal appearance  HENT:      Head: Atraumatic  Cardiovascular:      Rate and Rhythm: Regular rhythm  Heart sounds: Normal heart sounds  Pulmonary:      Breath sounds: Normal breath sounds  No wheezing  Abdominal:      General: Bowel sounds are normal  There is no distension  Palpations: Abdomen is soft  Musculoskeletal:         General: No swelling  Right lower leg: No edema  Left lower leg: No edema  Skin:     General: Skin is warm  Neurological:      Mental Status: She is alert and oriented to person, place, and time  Motor: No weakness     Psychiatric:         Mood and Affect: Mood normal        Additional Data:     Labs:    Results from last 7 days   Lab Units 10/31/22  0536   WBC Thousand/uL 12 59*   HEMOGLOBIN g/dL 7 8*   HEMATOCRIT % 25 7*   PLATELETS Thousands/uL 371   NEUTROS PCT % 68   LYMPHS PCT % 16   MONOS PCT % 7   EOS PCT % 6     Results from last 7 days   Lab Units 10/31/22  0536   SODIUM mmol/L 138   POTASSIUM mmol/L 4 4   CHLORIDE mmol/L 103   CO2 mmol/L 28   BUN mg/dL 20   CREATININE mg/dL 1 38*   ANION GAP mmol/L 7   CALCIUM mg/dL 9 4   GLUCOSE RANDOM mg/dL 106     Results from last 7 days   Lab Units 10/31/22  0536   INR  1 03     Results from last 7 days   Lab Units 10/31/22  1040 10/31/22  0709 10/31/22  0658 10/30/22  2103 10/30/22  1525 10/30/22  1048 10/30/22  1045 10/30/22  0647 10/29/22  2045 10/29/22  1629 10/29/22  1601 10/29/22  1052   POC GLUCOSE mg/dl 101 105 104 164* 157* 117 127 123 186* 255* 254* 151*     Results from last 7 days   Lab Units 10/25/22  0524   HEMOGLOBIN A1C % 9 8*               * I Have Reviewed All Lab Data Listed Above  * Additional Pertinent Lab Tests Reviewed: All Labs For Current Hospital Admission Reviewed    Imaging:    XR chest portable    Result Date: 10/27/2022  Impression: Mild pulmonary vascular congestion  Workstation performed: [id number]     XR chest 1 view portable    Result Date: 10/22/2022  Impression: No acute cardiopulmonary disease  Workstation performed: [id number]     XR foot 3+ views RIGHT    Result Date: 10/22/2022  Impression: No acute osseous abnormality  Workstation performed: [id number]     CT lower extremity w contrast left    Result Date: 10/22/2022  Impression: No acute bony process is seen  Moderate superficial soft tissue edema in the foot, ankle, and leg, superimposed cellulitis considered in the appropriate clinical setting  Correlation with patient's symptoms and laboratory values recommended  No discrete drainable collection identified  Other findings as above   Workstation performed: [id number]     Recent Cultures (last 7 days):           Last 24 Hours Medication List:   Current Facility-Administered Medications   Medication Dose Route Frequency Provider Last Rate   • acetaminophen 650 mg Oral Q6H PRN Sugey Wiseman PA-C     • albuterol  2 puff Inhalation Q4H PRN Amy Parry MD     • amLODIPine  10 mg Oral Daily Abdulaziz Orange, CRNP     • aspirin  81 mg Oral Daily Sugey Wiseman PA-C     • atorvastatin  40 mg Oral Daily With Katia Maria MD     • carvedilol  25 mg Oral BID With Meals Abdulaziz Orange, CRNP     • fentanyl citrate (PF)   Intravenous Code/Trauma/Sedation Herrera De Luna Sa, MD     • gabapentin  200 mg Oral BID Venkat Malloy PA-C     • heparin (porcine)   Intravenous Code/Trauma/Sedation Herrera De Luna Sa, MD     • heparin (porcine)  5,000 [facility]     • hydrALAZINE  10 mg Intravenous Q6H PRN Sugey Wiseman PA-C     • insulin glargine  30 Units Subcutaneous HS Abril Melvin MD     • insulin lispro  1-6 Units Subcutaneous 4x Daily (AC & HS) Sugey Carpenter PA-C     • lidocaine (PF)    Code/Trauma/Sedation Herrera De Luna Sa, MD     • LORazepam  0 5 mg Oral Q8H PRN Amy Nice MD     • metoclopramide  5 mg Intravenous Q6H PRN George Yates DO     • midazolam    Code/Trauma/Sedation Herrera De Luna Sa, MD     • morphine injection  4 mg Intravenous Q4H PRN Kaye Duncan DO     • ondansetron  4 mg Intravenous Q6H PRN Sugey Wiseman PA-C     • oxyCODONE  5 mg Oral Q4H PRN Amy Parry MD      Or   • oxyCODONE  10 mg Oral Q4H PRN Amy Parry MD     • sertraline  100 mg Oral Daily Sugey Wiseman PA-C     • sodium chloride  50 mL/hr Intravenous Continuous Sobia Para, CRNP 50 mL/hr (10/31/22 0012)        Today, Patient Was Seen By: Radha Randhawa    ** Please Note: Dictation voice to text software may have been used in the creation of this document   **

## 2022-11-01 ENCOUNTER — APPOINTMENT (INPATIENT)
Dept: NON INVASIVE DIAGNOSTICS | Facility: HOSPITAL | Age: 62
DRG: 853 | End: 2022-11-01
Payer: COMMERCIAL

## 2022-11-01 ENCOUNTER — APPOINTMENT (INPATIENT)
Dept: CT IMAGING | Facility: HOSPITAL | Age: 62
DRG: 853 | End: 2022-11-01
Payer: COMMERCIAL

## 2022-11-01 PROBLEM — R06.02 SHORTNESS OF BREATH: Status: ACTIVE | Noted: 2022-11-01

## 2022-11-01 LAB
ANION GAP SERPL CALCULATED.3IONS-SCNC: 7 MMOL/L (ref 4–13)
APTT PPP: 36 SECONDS (ref 23–37)
BUN SERPL-MCNC: 20 MG/DL (ref 5–25)
CALCIUM SERPL-MCNC: 8.9 MG/DL (ref 8.3–10.1)
CHLORIDE SERPL-SCNC: 102 MMOL/L (ref 96–108)
CO2 SERPL-SCNC: 30 MMOL/L (ref 21–32)
CREAT SERPL-MCNC: 1.26 MG/DL (ref 0.6–1.3)
ERYTHROCYTE [DISTWIDTH] IN BLOOD BY AUTOMATED COUNT: 15.2 % (ref 11.6–15.1)
ERYTHROCYTE [DISTWIDTH] IN BLOOD BY AUTOMATED COUNT: 15.2 % (ref 11.6–15.1)
GFR SERPL CREATININE-BSD FRML MDRD: 46 ML/MIN/1.73SQ M
GLUCOSE SERPL-MCNC: 152 MG/DL (ref 65–140)
GLUCOSE SERPL-MCNC: 170 MG/DL (ref 65–140)
GLUCOSE SERPL-MCNC: 174 MG/DL (ref 65–140)
GLUCOSE SERPL-MCNC: 192 MG/DL (ref 65–140)
GLUCOSE SERPL-MCNC: 223 MG/DL (ref 65–140)
GLUCOSE SERPL-MCNC: 263 MG/DL (ref 65–140)
HCT VFR BLD AUTO: 23 % (ref 34.8–46.1)
HCT VFR BLD AUTO: 26.1 % (ref 34.8–46.1)
HGB BLD-MCNC: 7.1 G/DL (ref 11.5–15.4)
HGB BLD-MCNC: 8 G/DL (ref 11.5–15.4)
INR PPP: 1.14 (ref 0.84–1.19)
MCH RBC QN AUTO: 27.5 PG (ref 26.8–34.3)
MCH RBC QN AUTO: 28 PG (ref 26.8–34.3)
MCHC RBC AUTO-ENTMCNC: 30.7 G/DL (ref 31.4–37.4)
MCHC RBC AUTO-ENTMCNC: 30.9 G/DL (ref 31.4–37.4)
MCV RBC AUTO: 90 FL (ref 82–98)
MCV RBC AUTO: 91 FL (ref 82–98)
PLATELET # BLD AUTO: 282 THOUSANDS/UL (ref 149–390)
PLATELET # BLD AUTO: 316 THOUSANDS/UL (ref 149–390)
PMV BLD AUTO: 8.6 FL (ref 8.9–12.7)
PMV BLD AUTO: 8.6 FL (ref 8.9–12.7)
POTASSIUM SERPL-SCNC: 3.8 MMOL/L (ref 3.5–5.3)
PROTHROMBIN TIME: 14.6 SECONDS (ref 11.6–14.5)
RBC # BLD AUTO: 2.54 MILLION/UL (ref 3.81–5.12)
RBC # BLD AUTO: 2.91 MILLION/UL (ref 3.81–5.12)
SODIUM SERPL-SCNC: 139 MMOL/L (ref 135–147)
WBC # BLD AUTO: 12.64 THOUSAND/UL (ref 4.31–10.16)
WBC # BLD AUTO: 13.43 THOUSAND/UL (ref 4.31–10.16)

## 2022-11-01 PROCEDURE — 85730 THROMBOPLASTIN TIME PARTIAL: CPT | Performed by: STUDENT IN AN ORGANIZED HEALTH CARE EDUCATION/TRAINING PROGRAM

## 2022-11-01 PROCEDURE — 99232 SBSQ HOSP IP/OBS MODERATE 35: CPT | Performed by: PHYSICIAN ASSISTANT

## 2022-11-01 PROCEDURE — 85027 COMPLETE CBC AUTOMATED: CPT | Performed by: STUDENT IN AN ORGANIZED HEALTH CARE EDUCATION/TRAINING PROGRAM

## 2022-11-01 PROCEDURE — 75635 CT ANGIO ABDOMINAL ARTERIES: CPT

## 2022-11-01 PROCEDURE — 93923 UPR/LXTR ART STDY 3+ LVLS: CPT | Performed by: SURGERY

## 2022-11-01 PROCEDURE — 99232 SBSQ HOSP IP/OBS MODERATE 35: CPT | Performed by: PODIATRIST

## 2022-11-01 PROCEDURE — 99232 SBSQ HOSP IP/OBS MODERATE 35: CPT | Performed by: INTERNAL MEDICINE

## 2022-11-01 PROCEDURE — 99232 SBSQ HOSP IP/OBS MODERATE 35: CPT | Performed by: STUDENT IN AN ORGANIZED HEALTH CARE EDUCATION/TRAINING PROGRAM

## 2022-11-01 PROCEDURE — 80048 BASIC METABOLIC PNL TOTAL CA: CPT | Performed by: NURSE PRACTITIONER

## 2022-11-01 PROCEDURE — 93923 UPR/LXTR ART STDY 3+ LVLS: CPT

## 2022-11-01 PROCEDURE — 93926 LOWER EXTREMITY STUDY: CPT

## 2022-11-01 PROCEDURE — 85610 PROTHROMBIN TIME: CPT | Performed by: STUDENT IN AN ORGANIZED HEALTH CARE EDUCATION/TRAINING PROGRAM

## 2022-11-01 PROCEDURE — 82948 REAGENT STRIP/BLOOD GLUCOSE: CPT

## 2022-11-01 RX ORDER — FUROSEMIDE 10 MG/ML
40 INJECTION INTRAMUSCULAR; INTRAVENOUS ONCE
Status: COMPLETED | OUTPATIENT
Start: 2022-11-01 | End: 2022-11-01

## 2022-11-01 RX ORDER — SODIUM CHLORIDE 9 MG/ML
75 INJECTION, SOLUTION INTRAVENOUS CONTINUOUS
Status: DISPENSED | OUTPATIENT
Start: 2022-11-01 | End: 2022-11-01

## 2022-11-01 RX ORDER — HEPARIN SODIUM 1000 [USP'U]/ML
8800 INJECTION, SOLUTION INTRAVENOUS; SUBCUTANEOUS ONCE
Status: COMPLETED | OUTPATIENT
Start: 2022-11-01 | End: 2022-11-01

## 2022-11-01 RX ORDER — CLOPIDOGREL BISULFATE 75 MG/1
75 TABLET ORAL DAILY
Status: DISCONTINUED | OUTPATIENT
Start: 2022-11-01 | End: 2023-01-12 | Stop reason: HOSPADM

## 2022-11-01 RX ORDER — HEPARIN SODIUM 1000 [USP'U]/ML
4400 INJECTION, SOLUTION INTRAVENOUS; SUBCUTANEOUS
Status: DISCONTINUED | OUTPATIENT
Start: 2022-11-01 | End: 2022-11-11

## 2022-11-01 RX ORDER — HEPARIN SODIUM 10000 [USP'U]/100ML
3-30 INJECTION, SOLUTION INTRAVENOUS
Status: DISCONTINUED | OUTPATIENT
Start: 2022-11-01 | End: 2022-11-11

## 2022-11-01 RX ORDER — NYSTATIN 100000 [USP'U]/G
POWDER TOPICAL 2 TIMES DAILY
Status: DISCONTINUED | OUTPATIENT
Start: 2022-11-01 | End: 2022-12-30

## 2022-11-01 RX ORDER — HEPARIN SODIUM 1000 [USP'U]/ML
8800 INJECTION, SOLUTION INTRAVENOUS; SUBCUTANEOUS
Status: DISCONTINUED | OUTPATIENT
Start: 2022-11-01 | End: 2022-11-11

## 2022-11-01 RX ADMIN — OXYCODONE HYDROCHLORIDE 10 MG: 10 TABLET ORAL at 05:35

## 2022-11-01 RX ADMIN — NYSTATIN: 100000 POWDER TOPICAL at 17:05

## 2022-11-01 RX ADMIN — NYSTATIN: 100000 POWDER TOPICAL at 12:47

## 2022-11-01 RX ADMIN — CARVEDILOL 25 MG: 25 TABLET, FILM COATED ORAL at 17:06

## 2022-11-01 RX ADMIN — GABAPENTIN 200 MG: 100 CAPSULE ORAL at 08:08

## 2022-11-01 RX ADMIN — SODIUM CHLORIDE 75 ML/HR: 0.9 INJECTION, SOLUTION INTRAVENOUS at 13:57

## 2022-11-01 RX ADMIN — OXYCODONE HYDROCHLORIDE 10 MG: 10 TABLET ORAL at 15:00

## 2022-11-01 RX ADMIN — HEPARIN SODIUM 5000 UNITS: 5000 INJECTION INTRAVENOUS; SUBCUTANEOUS at 22:04

## 2022-11-01 RX ADMIN — CARVEDILOL 25 MG: 25 TABLET, FILM COATED ORAL at 06:34

## 2022-11-01 RX ADMIN — INSULIN GLARGINE 30 UNITS: 100 INJECTION, SOLUTION SUBCUTANEOUS at 22:04

## 2022-11-01 RX ADMIN — HEPARIN SODIUM 8800 UNITS: 1000 INJECTION INTRAVENOUS; SUBCUTANEOUS at 23:40

## 2022-11-01 RX ADMIN — ATORVASTATIN CALCIUM 40 MG: 40 TABLET, FILM COATED ORAL at 17:06

## 2022-11-01 RX ADMIN — FUROSEMIDE 40 MG: 10 INJECTION, SOLUTION INTRAVENOUS at 09:20

## 2022-11-01 RX ADMIN — ASPIRIN 81 MG CHEWABLE TABLET 81 MG: 81 TABLET CHEWABLE at 08:08

## 2022-11-01 RX ADMIN — INSULIN LISPRO 1 UNITS: 100 INJECTION, SOLUTION INTRAVENOUS; SUBCUTANEOUS at 12:47

## 2022-11-01 RX ADMIN — INSULIN LISPRO 1 UNITS: 100 INJECTION, SOLUTION INTRAVENOUS; SUBCUTANEOUS at 22:04

## 2022-11-01 RX ADMIN — HEPARIN SODIUM 5000 UNITS: 5000 INJECTION INTRAVENOUS; SUBCUTANEOUS at 05:35

## 2022-11-01 RX ADMIN — HEPARIN SODIUM 5000 UNITS: 5000 INJECTION INTRAVENOUS; SUBCUTANEOUS at 13:57

## 2022-11-01 RX ADMIN — INSULIN LISPRO 1 UNITS: 100 INJECTION, SOLUTION INTRAVENOUS; SUBCUTANEOUS at 17:05

## 2022-11-01 RX ADMIN — HEPARIN SODIUM 18 UNITS/KG/HR: 10000 INJECTION, SOLUTION INTRAVENOUS at 23:38

## 2022-11-01 RX ADMIN — INSULIN LISPRO 2 UNITS: 100 INJECTION, SOLUTION INTRAVENOUS; SUBCUTANEOUS at 06:21

## 2022-11-01 RX ADMIN — GABAPENTIN 200 MG: 100 CAPSULE ORAL at 17:06

## 2022-11-01 RX ADMIN — SERTRALINE HYDROCHLORIDE 100 MG: 100 TABLET ORAL at 08:08

## 2022-11-01 RX ADMIN — IOHEXOL 130 ML: 350 INJECTION, SOLUTION INTRAVENOUS at 15:45

## 2022-11-01 RX ADMIN — CLOPIDOGREL BISULFATE 75 MG: 75 TABLET ORAL at 08:08

## 2022-11-01 NOTE — PROGRESS NOTES
NEPHROLOGY PROGRESS NOTE   Sudarshan Stafford 65 y o  female MRN: [id number]  Unit/Bed#: [place] [address] [address] [id number] Encounter: [id number]      ASSESSMENT & PLAN:  1  Acute kidney injury, 1st episode creatinine peak at 1 44 and went down to 1 1, patient had a 2nd episode with creatinine rise on 10/03 to 1 39, creatinine improving down to 1 26 today  Baseline creatinine around 0 7-1 1 since 2020  Status post intravenously fluids pre and post arteriogram yesterday  Avoid relative hypotension  Keep holding Ace inhibitors for now  Okay to resume diuretics if there is no plan for further intervention  Follow daily labs    2  Left lower extremity wound, podiatry and vascular on board  Status post IR aortogram with runoff, left lower leg arteriogram with proximal and distal SFA shockwave lithotripsy and stent placement as well as right common iliac artery stent placement on 10/31  As per IR notes patient received 180 cc of intravenously dye  Follow daily labs    3  Hypertension, blood pressure acceptable, keep holding Ace inhibitors for now  Follow labs in the morning  Continue with Norvasc and Coreg  4  Lower extremity edema, okay to resume diuretics if not plan for further intervention  Follow low-salt diet     5  Anemia, monitor H&H      SUBJECTIVE:  Patient seen and examined, reported developed hypoxemia after angioplasty yesterday  Intravenously fluids were stopped, received intravenously Lasix  Today patient in general feeling better, denies any chest pain, shortness of breath, no nausea, no vomiting, no abdominal pain        OBJECTIVE:  Current Weight: Weight - Scale: 112 kg (246 lb 11 1 oz)  Vitals:    11/01/22 0727   BP: 128/56   Pulse: 81   Resp: 18   Temp: 98 6 °F (37 °C)   SpO2: 92%       Intake/Output Summary (Last 24 hours) at 11/1/2022 0914  Last data filed at 10/31/2022 1701  Gross per 24 hour   Intake 200 ml   Output 1225 ml   Net -1025 ml     General: conscious, cooperative, in not acute distress  Eyes: conjunctivae pink, anicteric sclerae  ENT: lips and mucous membranes moist, oxygen nasal cannula  Neck: supple, no JVD  Chest: clear breath sounds bilateral, no crackles, ronchus or wheezings  CVS: distinct S1 & S2, normal rate, regular rhythm  Abdomen:  Obese, non-tender, non-distended, normoactive bowel sounds  Extremities: no significant edema of both legs  Skin: no rash  Neuro: awake, alert, oriented        Medications:    Current Facility-Administered Medications:   •  acetaminophen (TYLENOL) tablet 650 mg, 650 mg, Oral, Q6H PRN, Sugey Wiseman PA-C, 650 mg at 10/23/22 1751  •  albuterol (PROVENTIL HFA,VENTOLIN HFA) inhaler 2 puff, 2 puff, Inhalation, Q4H PRN, Amy Sheth MD, 2 puff at 10/30/22 0611  •  amLODIPine (NORVASC) tablet 10 mg, 10 mg, Oral, Daily, ALEX Ruiz, 10 mg at 10/31/22 6849  •  aspirin chewable tablet 81 mg, 81 mg, Oral, Daily, Sugey Wiseman PA-C, 81 mg at 11/01/22 0808  •  atorvastatin (LIPITOR) tablet 40 mg, 40 mg, Oral, Daily With Dinner, Ro Li MD, 40 mg at 10/31/22 1809  •  carvedilol (COREG) tablet 25 mg, 25 mg, Oral, BID With Meals, ALEX Ruiz, 25 mg at 11/01/22 4392  •  clopidogrel (PLAVIX) tablet 75 mg, 75 mg, Oral, Daily, Dusty Hardin PA-C, 75 mg at 11/01/22 5714  •  furosemide (LASIX) injection 40 mg, 40 mg, Intravenous, Once, Ivis Piper MD  •  gabapentin (NEURONTIN) capsule 200 mg, 200 mg, Oral, BID, Sugey Wiseman PA-C, 200 mg at 11/01/22 0808  •  heparin (porcine) subcutaneous injection 5,000 Units, 5,000 Units, Subcutaneous, Q8H [facility], 5,000 Units at 11/01/22 0535 **AND** [CANCELED] Platelet count, , , Once, Sugey Wiseman PA-C  •  hydrALAZINE (APRESOLINE) injection 10 mg, 10 mg, Intravenous, Q6H PRN, Sugey Wiseman PA-C, 10 mg at 10/27/22 1857  •  insulin glargine (LANTUS) subcutaneous injection 30 Units 0 3 mL, 30 Units, Subcutaneous, HS, Amy Parry MD, 30 Units at 10/31/22 2108  •  insulin lispro (HumaLOG) 100 units/mL subcutaneous injection 1-6 Units, 1-6 Units, Subcutaneous, 4x Daily (AC & HS), 2 Units at 11/01/22 0621 **AND** Fingerstick Glucose (POCT), , , 4x Daily AC and at bedtime, Sugey Wiseman PA-C  •  LORazepam (ATIVAN) tablet 0 5 mg, 0 5 mg, Oral, Q8H PRN, Amy Parry MD, 0 5 mg at 10/31/22 2108  •  metoclopramide (REGLAN) injection 5 mg, 5 mg, Intravenous, Q6H PRN, Derryl Revel, DO, 5 mg at 10/23/22 3900  •  morphine injection 4 mg, 4 mg, Intravenous, Q4H PRN, Derryl Revel, DO, 4 mg at 10/30/22 3566  •  ondansetron (ZOFRAN) injection 4 mg, 4 mg, Intravenous, Q6H PRN, Sugey Wiseman PA-C, 4 mg at 10/22/22 2241  •  oxyCODONE (ROXICODONE) IR tablet 5 mg, 5 mg, Oral, Q4H PRN, 5 mg at 10/31/22 2108 **OR** oxyCODONE (ROXICODONE) immediate release tablet 10 mg, 10 mg, Oral, Q4H PRN, Ze Garcia MD, 10 mg at 11/01/22 0535  •  sertraline (ZOLOFT) tablet 100 mg, 100 mg, Oral, Daily, Sugey Wiseman PA-C, 100 mg at 11/01/22 0808    Invasive Devices:        Lab Results:   Results from last 7 days   Lab Units 11/01/22  0445 10/31/22  0536 10/30/22  0504   WBC Thousand/uL 13 43* 12 59* 12 90*   HEMOGLOBIN g/dL 8 0* 7 8* 7 8*   HEMATOCRIT % 26 1* 25 7* 25 5*   PLATELETS Thousands/uL 316 371 368   SODIUM mmol/L 139 138 137   POTASSIUM mmol/L 3 8 4 4 4 5   CHLORIDE mmol/L 102 103 103   CO2 mmol/L 30 28 28   BUN mg/dL 20 20 20   CREATININE mg/dL 1 26 1 38* 1 39*   CALCIUM mg/dL 8 9 9 4 9 2   MAGNESIUM mg/dL  --  1 7  --    PHOSPHORUS mg/dL  --  5 5*  --        Previous work up:  See previous notes      Portions of the record may have been created with voice recognition software  Occasional wrong word or "sound a like" substitutions may have occurred due to the inherent limitations of voice recognition software  Read the chart carefully and recognize, using context, where substitutions have occurred  If you have any questions, please contact the dictating provider

## 2022-11-01 NOTE — PROGRESS NOTES
[address] [facility]  [facility] 1960, 65 y o  female MRN: [id number]  Unit/Bed#: [place] [address] [facility] [id number] Encounter: [id number]  Primary Care Provider: ALEX Will   Date and time admitted to hospital: 10/21/2022  7:58 PM    * Non healing left heel wound  Assessment & Plan  · In setting of uncontrolled diabetes and known PAD  · Arterial duplex reviewed, "Great toe pressure of 35 mm Hg, below healing threshold for a diabetic"  · MRI of the foot noted for "a large plantar lateral heel ulcer, without underlying soft tissue abscess  · Received Zosyn x7, no further abx per ID  · Status post arteriogram with angioplasty  · Continue aspirin, Plavix and statin  · Podiatry evaluated, recommending outpatient wound care, no surgical intervention  · IR arteriogram completed on 10/31 left  lower leg arteriogram with proximal and distal SFA shockwave balloon lithotripsy and stent placement, as well as right common iliac artery stent placement  · Repeat SHARIF vascular leads study on 11/01 showed stenosis with increased velocity on the right concerning for stent migration  · Will repeat CTA imaging with runoff    PAD (peripheral artery disease) ([facility] [address] )  Assessment & Plan  · Vascular surgery input appreciated  · Continue with Plavix, aspirin, statin therapy  · Status post arteriogram on 11/01 with angioplasty, with stent placement on left lower leg and right common iliac artery        Shortness of breath  Assessment & Plan  Patient did have episode of shortness of breath, with increasing oxygen requirements  Suspected likely due volume overload, elevated proBNP, x-ray imaging showing edema  Treated with IV Lasix with good urine output and improvement O2 requirement  Will give additional IV Lasix today, hold further diuretics  IV fluids per Nephrology given concern for contrast induced nephropathy    Hypertensive urgency  Assessment & Plan    · Continue Coreg, blood pressure well controlled  · Continue lisinopril 40 mg and amlodipine 10 mg daily    Wound of lower extremity  Assessment & Plan  MRI LLE without evidence of osteomyelitis   Abx have been discontinued     Type 2 diabetes mellitus with hyperglycemia, with long-term current use of insulin [facility])  Assessment & Plan  Lab Results   Component Value Date    HGBA1C 9 8 (H) 10/25/2022     Recent Labs     22  0618 22  0724 22  1118 22  1601   POCGLU 223* 192* 152* 174*     · Uncontrolled with A1C at 9 8  · Blood glucose controlled on lantus 30 units daily  · Continue insulin sliding scale    Benign essential hypertension  Assessment & Plan  Controlled with resumption of home medications, continue current regimen    Depression, recurrent (HCC)  Assessment & Plan  Continue sertraline, Xanax PRN      VTE Pharmacologic Prophylaxis:   Pharmacologic: Heparin  Mechanical VTE Prophylaxis in Place: Yes    Patient Centered Rounds: I have performed bedside rounds with nursing staff today  Discussions with Specialists or Other Care Team Provider: Nephro, Vascular    Education and Discussions with Family / Patient: patient    Time Spent for Care: 30 minutes  More than 50% of total time spent on counseling and coordination of care as described above  Current Length of Stay: 10 day(s)    Current Patient Status: Inpatient   Certification Statement: The patient will continue to require additional inpatient hospital stay due to pending further CT scan    Discharge Plan: pending    Code Status: Level 1 - Full Code      Subjective:   No events overnight  Breathing has improved  Denies CP, Sob or nausea/vomiting  Objective:     Vitals:   Temp (24hrs), Av 1 °F (36 7 °C), Min:97 2 °F (36 2 °C), Max:98 9 °F (37 2 °C)    Temp:  [97 2 °F (36 2 °C)-98 9 °F (37 2 °C)] 98 7 °F (37 1 °C)  HR:  [81-88] 86  Resp:  [18-20] 20  BP: (128-172)/(56-81) 168/70  SpO2:  [86 %-98 %] 86 %  Body mass index is 42 68 kg/m²       Input and Output Summary (last 24 hours): Intake/Output Summary (Last 24 hours) at 11/1/2022 1659  Last data filed at 11/1/2022 1401  Gross per 24 hour   Intake --   Output 1325 ml   Net -1325 ml       Physical Exam:     Physical Exam  Vitals reviewed  Constitutional:       General: She is not in acute distress  Appearance: Normal appearance  HENT:      Head: Atraumatic  Cardiovascular:      Rate and Rhythm: Regular rhythm  Heart sounds: Normal heart sounds  Pulmonary:      Breath sounds: Rales present  No wheezing  Abdominal:      General: Bowel sounds are normal  There is no distension  Palpations: Abdomen is soft  Musculoskeletal:         General: No swelling  Right lower leg: Edema present  Left lower leg: Edema present  Skin:     General: Skin is warm  Neurological:      Mental Status: She is alert and oriented to person, place, and time  Motor: No weakness  Psychiatric:         Mood and Affect: Mood normal          Additional Data:     Labs:    Results from last 7 days   Lab Units 11/01/22  0445 10/31/22  0536   WBC Thousand/uL 13 43* 12 59*   HEMOGLOBIN g/dL 8 0* 7 8*   HEMATOCRIT % 26 1* 25 7*   PLATELETS Thousands/uL 316 371   NEUTROS PCT %  --  68   LYMPHS PCT %  --  16   MONOS PCT %  --  7   EOS PCT %  --  6     Results from last 7 days   Lab Units 11/01/22  0445   SODIUM mmol/L 139   POTASSIUM mmol/L 3 8   CHLORIDE mmol/L 102   CO2 mmol/L 30   BUN mg/dL 20   CREATININE mg/dL 1 26   ANION GAP mmol/L 7   CALCIUM mg/dL 8 9   GLUCOSE RANDOM mg/dL 263*     Results from last 7 days   Lab Units 10/31/22  0536   INR  1 03     Results from last 7 days   Lab Units 11/01/22  1601 11/01/22  1118 11/01/22  0724 11/01/22  0618 10/31/22  2106 10/31/22  1806 10/31/22  1040 10/31/22  0709 10/31/22  0658 10/30/22  2103 10/30/22  1525 10/30/22  1048   POC GLUCOSE mg/dl 174* 152* 192* 223* 208* 90 101 105 104 164* 157* 117                   * I Have Reviewed All Lab Data Listed Above    * Additional Pertinent Lab Tests Reviewed: All Labs For Current Hospital Admission Reviewed    Imaging:    XR chest portable    Result Date: 10/27/2022  Impression: Mild pulmonary vascular congestion  Workstation performed: [id number]     XR chest 1 view portable    Result Date: 10/22/2022  Impression: No acute cardiopulmonary disease  Workstation performed: [id number]     XR foot 3+ views RIGHT    Result Date: 10/22/2022  Impression: No acute osseous abnormality  Workstation performed: [id number]     CT lower extremity w contrast left    Result Date: 10/22/2022  Impression: No acute bony process is seen  Moderate superficial soft tissue edema in the foot, ankle, and leg, superimposed cellulitis considered in the appropriate clinical setting  Correlation with patient's symptoms and laboratory values recommended  No discrete drainable collection identified  Other findings as above   Workstation performed: [id number]       Recent Cultures (last 7 days):           Last 24 Hours Medication List:   Current Facility-Administered Medications   Medication Dose Route Frequency Provider Last Rate   • acetaminophen  650 mg Oral Q6H PRN Sugey Wiseman PA-C     • albuterol  2 puff Inhalation Q4H PRN Amy Lynn MD     • amLODIPine  10 mg Oral Daily Lauren Chamber, CRNP     • aspirin  81 mg Oral Daily Sugey Wiseman PA-C     • atorvastatin  40 mg Oral Daily With Mayra Jo MD     • carvedilol  25 mg Oral BID With Meals [place] Crys, CRNP     • clopidogrel  75 mg Oral Daily Jeremi Dia PA-C     • gabapentin  200 mg Oral BID Sugey Wiseman PA-C     • heparin (porcine)  5,000 Units Subcutaneous [facility] Sugey Wiseman PA-C     • hydrALAZINE  10 mg Intravenous Q6H PRN Sugey Wiseman PA-C     • insulin glargine  30 Units Subcutaneous HS Jorje Suggs MD     • insulin lispro  1-6 Units Subcutaneous 4x Daily (AC & HS) Sugey Wiseman PA-C     • LORazepam  0 5 mg Oral Q8H PRN Jefe Crane MD     • metoclopramide  5 mg Intravenous Q6H PRN Sherry Ade, DO     • morphine injection  4 mg Intravenous Q4H PRN Sherry Ade, DO     • nystatin   Topical BID Litzy Reveles MD     • ondansetron  4 mg Intravenous Q6H PRN Sugey Wiseman PA-C     • oxyCODONE  5 mg Oral Q4H PRN Amy Parry MD      Or   • oxyCODONE  10 mg Oral Q4H PRN Amy Parry MD     • sertraline  100 mg Oral Daily Sugey Wiseman PA-C     • sodium chloride  75 mL/hr Intravenous Continuous Nicanor Tinsley PA-C 75 mL/hr (11/01/22 8617)        Today, Patient Was Seen By: Litzy Reveles    ** Please Note: Dictation voice to text software may have been used in the creation of this document   **

## 2022-11-01 NOTE — PLAN OF CARE
Problem: MOBILITY - ADULT  Goal: Maintain or return to baseline ADL function  Description: INTERVENTIONS:  -  Assess patient's ability to carry out ADLs; assess patient's baseline for ADL function and identify physical deficits which impact ability to perform ADLs (bathing, care of mouth/teeth, toileting, grooming, dressing, etc )  - Assess/evaluate cause of self-care deficits   - Assess range of motion  - Assess patient's mobility; develop plan if impaired  - Assess patient's need for assistive devices and provide as appropriate  - Encourage maximum independence but intervene and supervise when necessary  - Involve family in performance of ADLs  - Assess for home care needs following discharge   - Consider OT consult to assist with ADL evaluation and planning for discharge  - Provide patient education as appropriate  Outcome: Progressing  Goal: Maintains/Returns to pre admission functional level  Description: INTERVENTIONS:  - Perform BMAT or MOVE assessment daily    - Set and communicate daily mobility goal to care team and patient/family/caregiver  - Collaborate with rehabilitation services on mobility goals if consulted  - Assist patient in repositioning every 2 hours    - Dangle patient 3 times a day  - Stand patient 3 times a day  - Out of bed to chair as tolerated  - Out of bed for meals  - Out of bed for toileting  - Record patient progress and toleration of activity level   Outcome: Progressing     Problem: METABOLIC, FLUID AND ELECTROLYTES - ADULT  Goal: Electrolytes maintained within normal limits  Description: INTERVENTIONS:  - Monitor labs and assess patient for signs and symptoms of electrolyte imbalances  - Administer electrolyte replacement as ordered  - Monitor response to electrolyte replacements, including repeat lab results as appropriate  - Instruct patient on fluid and nutrition as appropriate  Outcome: Progressing  Goal: Fluid balance maintained  Description: INTERVENTIONS:  - Monitor labs - Monitor I/O and WT  - Instruct patient on fluid and nutrition as appropriate  - Assess for signs & symptoms of volume excess or deficit  Outcome: Progressing  Goal: Glucose maintained within target range  Description: INTERVENTIONS:  - Monitor Blood Glucose as ordered  - Assess for signs and symptoms of hyperglycemia and hypoglycemia  - Administer ordered medications to maintain glucose within target range  - Assess nutritional intake and initiate nutrition service referral as needed  Outcome: Progressing

## 2022-11-01 NOTE — PLAN OF CARE
Problem: Potential for Falls  Goal: Patient will remain free of falls  Description: INTERVENTIONS:  - Educate patient/family on patient safety including physical limitations  - Instruct patient to call for assistance with activity   - Consult OT/PT to assist with strengthening/mobility   - Keep Call bell within reach  - Keep bed low and locked with side rails adjusted as appropriate  - Keep care items and personal belongings within reach  - Initiate and maintain comfort rounds  - Make Fall Risk Sign visible to staff  - Offer Toileting every 2 Hours, in advance of need  - Initiate/Maintain bed  chair alarm  - Obtain necessary fall risk management equipment: bed chair alarm, call bell, gripper sock, walker, bedside commode  - Apply yellow socks and bracelet for high fall risk patients  - Consider moving patient to room near nurses station  Outcome: Progressing

## 2022-11-01 NOTE — QUICK NOTE
Vascular Surgery    Post intervention SHARIF completed  There was evidence of improvement in SHARIF in the left however on the right, SHARIF was noted to decrease to 0 51 (0 80)  Repeat TARI duplex was performed which demonstrates significantly elevated velocities in the CFA and SFA ()  Evidence of possible stent migration  D/w attending   Patient will need CTA of the abdomen w/run off for formal assessment   Patient is at risk of NYDIA given recent elevated Cr   D/w Nephrology and SLIM, appreciate assistance     D/w Dr Simran Moyer  Formal recommendations to follow CTA    Eli Rees PA-C  [facility]

## 2022-11-01 NOTE — ASSESSMENT & PLAN NOTE
· Vascular surgery input appreciated  · Continue with Plavix, aspirin, statin therapy  · Status post arteriogram on 11/01 with angioplasty, with stent placement on left lower leg and right common iliac artery

## 2022-11-01 NOTE — ASSESSMENT & PLAN NOTE
65year old female former smoker w/HTN, HLD, uncontrolled type II DM (A1c 9 8), hx CVA, presenting with nonhealing extensive L heel ulceration and R hallux and 5th digit ulceration  Vascular surgery consulted for input  TARI duplex 10/24/22: Rt- monophasic waveforms of the CFA indicative of proximal disease, SHARIF 0 80/64/61  Lt- diffuse disease without focal stenosis, SHARIF 0 61/21/35  CTA abd w/run off 10/27/22: Moderate stenosis of mid infrarenal aorta  3 vessel run off b/l, fem-pop dz, L pop >75% stenosis, L prox SFA 50-75% stenosis  R distal SFA 50-75% stenosis  MRI 10/27 L heel: no evidence of OM    S/p abdominal aortogram, LLE run off w/R ALLISON PTA/stent and LLE SFA shockwave angioplasty/FATOU 10/31/22 (IR-[facility])    Recommendations:  -Bilateral tissue loss in the setting of uncontrolled type II DM and NYDAI on admission, now s/p angiogram w/intervention  Groin access is soft without hematoma or ecchymosis  -S/p angiogram yesterday patient noted to be in acute respiratoy distress and hypoxic, requiring 10L O2 nonrebreather to keep O2 sat >90  Felt to be secondary to fluid overload  Patient diuresed and continues to improve clinically and weaning O2 as tolerated  -D/w Dr Jackeline Guerra, L heel ulceration stable without OM  -Now s/p LLE angiogram w/ R ALLISON PTA/stent and L SFA shockwave angioplasty and FATOU  Two vessel run off to the LLE via AT/peroneal  PT chronically occluded   -Medical management with ASA, statin   Will add Plavix as patient is now s/p stent placement   -Continue local wound care per podiatry  -Medical management and glucose control per SLIM  -Follow up post intervention SHARIF  -Will d/w Dr Dash Thapa

## 2022-11-01 NOTE — ASSESSMENT & PLAN NOTE
Lab Results   Component Value Date    HGBA1C 9 8 (H) 10/25/2022     Recent Labs     11/01/22  0618 11/01/22  0724 11/01/22  1118 11/01/22  1601   POCGLU 223* 192* 152* 174*     · Uncontrolled with A1C at 9 8  · Blood glucose controlled on lantus 30 units daily  · Continue insulin sliding scale

## 2022-11-01 NOTE — PROGRESS NOTES
Podiatry - Progress Note  Patient: Jason Sarmiento 65 y o  female   MRN: [id number]  PCP: ALEX Bryant  Unit/Bed#: [place] [address] 2 [facility] 87 [id number] Encounter: [id number]  Date Of Visit: 22    ASSESSMENT:    Jason Sarmiento is a 65 y o  female with:    1  Diabetic ulcer left heel  2  Eschar / ischemic change right great toe and 5th toe  3  Type II diabetes with PAD   -s/p abdominal aortogram w/intervention (DOS: 10/31/22)      PLAN:    · Dressings changed  B/L eschars and Right 5th toe dry gangrene stable without sign of acute infection  · Patient s/p Agram  No plan for surgical intervention at this time  Will plan for continued wound care follow up  · Continue local wound care, betadine DSD to L heel, betadine JED to B/L eschars  Appreciate nursing assistance with dressing changes  · Elevation and offloading on green foam wedges or pillows when non-ambulatory  · Rest of care per primary team      Weightbearing status: WBAT in heel offloading shoe left foot, weight-bearing as tolerated right foot    SUBJECTIVE:     The patient was seen, evaluated, and assessed at bedside today  The patient was awake, alert, and in no acute distress  No acute events overnight  The patient reports mild pain from B/L lower extremities  Patient denies N/V/F/chills/SOB/CP  OBJECTIVE:     Vitals:   /59   Pulse 82   Temp 98 6 °F (37 °C) (Oral)   Resp 18   Ht 5' 3 75" (1 619 m)   Wt 112 kg (246 lb 11 1 oz)   SpO2 95%   BMI 42 68 kg/m²     Temp (24hrs), Av 9 °F (36 6 °C), Min:97 2 °F (36 2 °C), Max:98 9 °F (37 2 °C)      Physical Exam:     Lungs: Non labored breathing  Abdomen: Soft, non-tender  Lower Extremity:  Cardiovascular status at baseline from admission  Neurological status at baseline from admission  Musculoskeletal status at baseline from admission  No calf tenderness noted       Wound #: 1  Location: Left heel  Length 7cm: Width 5cm: Depth unknowncm:   Deepest Tissue Noted in Base: unknown  Probe to Bone: Unknown  Peripheral Skin Description: Attached  Granulation: 0% Fibrotic Tissue: 0% Necrotic Tissue: 100%   Drainage Amount: None  Signs of Infection: No    Dry, stable eschars noted to left medial lower leg, left dorsal 5th toe, and right dorsal hallux  Dry gangrene noted to right 5th toe, with some ischemic changes extending proximally dorsally  There is no ascending erythema, no fluctuance, no crepitus, no drainage  Clinical Images 11/01/22: Additional Data:     Labs:    Results from last 7 days   Lab Units 11/01/22  0445 10/31/22  0536   WBC Thousand/uL 13 43* 12 59*   HEMOGLOBIN g/dL 8 0* 7 8*   HEMATOCRIT % 26 1* 25 7*   PLATELETS Thousands/uL 316 371   NEUTROS PCT %  --  68   LYMPHS PCT %  --  16   MONOS PCT %  --  7   EOS PCT %  --  6     Results from last 7 days   Lab Units 11/01/22  0445   POTASSIUM mmol/L 3 8   CHLORIDE mmol/L 102   CO2 mmol/L 30   BUN mg/dL 20   CREATININE mg/dL 1 26   CALCIUM mg/dL 8 9     Results from last 7 days   Lab Units 10/31/22  0536   INR  1 03       * I Have Reviewed All Lab Data Listed Above  Recent Cultures (last 7 days):               Imaging: I have personally reviewed pertinent films in PACS  EKG, Pathology, and Other Studies: I have personally reviewed pertinent reports  ** Please Note: Portions of the record may have been created with voice recognition software  Occasional wrong word or "sound a like" substitutions may have occurred due to the inherent limitations of voice recognition software  Read the chart carefully and recognize, using context, where substitutions have occurred   **

## 2022-11-01 NOTE — PROGRESS NOTES
[facility] [facility]  Progress Note - [address] 1960, 65 y o  female MRN: [id number]  Unit/Bed#: [place] [address] 2 [place] 87 221-02 Encounter: [id number]  Primary Care Provider: ALEX Ramos   Date and time admitted to hospital: 10/21/2022  7:58 PM    PAD (peripheral artery disease) [facility])  Assessment & Plan  65year old female former smoker w/HTN, HLD, uncontrolled type II DM (A1c 9 8), hx CVA, presenting with nonhealing extensive L heel ulceration and R hallux and 5th digit ulceration  Vascular surgery consulted for input  TARI duplex 10/24/22: Rt- monophasic waveforms of the CFA indicative of proximal disease, SHARIF 0 80/64/61  Lt- diffuse disease without focal stenosis, SHARIF 0 61/21/35  CTA abd w/run off 10/27/22: Moderate stenosis of mid infrarenal aorta  3 vessel run off b/l, fem-pop dz, L pop >75% stenosis, L prox SFA 50-75% stenosis  R distal SFA 50-75% stenosis  MRI 10/27 L heel: no evidence of OM    S/p abdominal aortogram, LLE run off w/R ALLISON PTA/stent and LLE SFA shockwave angioplasty/FATOU 10/31/22 (IR-[facility])    Recommendations:  -Bilateral tissue loss in the setting of uncontrolled type II DM and NYDIA on admission, now s/p angiogram w/intervention  Groin access is soft without hematoma or ecchymosis  -S/p angiogram yesterday patient noted to be in acute respiratoy distress and hypoxic, requiring 10L O2 nonrebreather to keep O2 sat >90  Felt to be secondary to fluid overload  Patient diuresed and continues to improve clinically and weaning O2 as tolerated  -D/w Dr Chloe Zavala L heel ulceration stable without OM  -Now s/p LLE angiogram w/ R ALLISON PTA/stent and L SFA shockwave angioplasty and FATOU  Two vessel run off to the LLE via AT/peroneal  PT chronically occluded   -Medical management with ASA, statin   Will add Plavix as patient is now s/p stent placement   -Continue local wound care per podiatry  -Medical management and glucose control per SLIM  -Follow up post intervention SHARIF  -Will d/w Dr Leny Barahona     Subjective:  Patient offers no complaints  Reports that her shortness of breath has significantly improved since yesterday  Reports some pain in the right groin access site however this has also improved  Otherwise offers no complaints  Post intervention ABIs are pending    Vitals:  /59   Pulse 82   Temp 98 6 °F (37 °C)   Resp 18   Ht 5' 3 75" (1 619 m)   Wt 112 kg (246 lb 11 1 oz)   SpO2 95%   BMI 42 68 kg/m²     I/Os:  I/O last 3 completed shifts: In: 490 [I V :490]  Out: 1575 [Urine:1575]  No intake/output data recorded  Lab Results and Cultures:   CBC with diff:   Lab Results   Component Value Date    WBC 13 43 (H) 11/01/2022    HGB 8 0 (L) 11/01/2022    HCT 26 1 (L) 11/01/2022    MCV 90 11/01/2022     11/01/2022    MCH 27 5 11/01/2022    MCHC 30 7 (L) 11/01/2022    RDW 15 2 (H) 11/01/2022    MPV 8 6 (L) 11/01/2022    NRBC 0 10/31/2022   ,   BMP/CMP:  Lab Results   Component Value Date    SODIUM 139 11/01/2022    K 3 8 11/01/2022     11/01/2022    CO2 30 11/01/2022    BUN 20 11/01/2022    CREATININE 1 26 11/01/2022    CALCIUM 8 9 11/01/2022    AST 39 10/24/2022    ALT 26 10/24/2022    ALKPHOS 100 10/24/2022    EGFR 46 11/01/2022   ,   Lipid Panel: No results found for: CHOL,   Coags:   Lab Results   Component Value Date    PTT 33 10/31/2022    INR 1 03 10/31/2022   ,     Blood Culture:   Lab Results   Component Value Date    BLOODCX No Growth After 5 Days   10/21/2022   ,   Urinalysis:   Lab Results   Component Value Date    COLORU Light Yellow 10/21/2022    CLARITYU Turbid 10/21/2022    SPECGRAV >=1 030 10/21/2022    PHUR 6 0 10/21/2022    PHUR 6 5 02/23/2018    LEUKOCYTESUR Negative 10/21/2022    NITRITE Negative 10/21/2022    GLUCOSEU 250 (1/4%) (A) 10/21/2022    KETONESU Negative 10/21/2022    BILIRUBINUR Negative 10/21/2022    BLOODU Large (A) 10/21/2022   ,   Urine Culture:   Lab Results   Component Value Date    URINECX >100,000 cfu/ml Escherichia coli (A) 10/21/2022   ,   Wound Culure: No results found for: WOUNDCULT    Medications:  Current Facility-Administered Medications   Medication Dose Route Frequency   • acetaminophen (TYLENOL) tablet 650 mg  650 mg Oral Q6H PRN   • albuterol (PROVENTIL HFA,VENTOLIN HFA) inhaler 2 puff  2 puff Inhalation Q4H PRN   • amLODIPine (NORVASC) tablet 10 mg  10 mg Oral Daily   • aspirin chewable tablet 81 mg  81 mg Oral Daily   • atorvastatin (LIPITOR) tablet 40 mg  40 mg Oral Daily With Dinner   • carvedilol (COREG) tablet 25 mg  25 mg Oral BID With Meals   • clopidogrel (PLAVIX) tablet 75 mg  75 mg Oral Daily   • gabapentin (NEURONTIN) capsule 200 mg  200 mg Oral BID   • heparin (porcine) subcutaneous injection 5,000 Units  5,000 Units Subcutaneous Q8H [facility]   • hydrALAZINE (APRESOLINE) injection 10 mg  10 mg Intravenous Q6H PRN   • insulin glargine (LANTUS) subcutaneous injection 30 Units 0 3 mL  30 Units Subcutaneous HS   • insulin lispro (HumaLOG) 100 units/mL subcutaneous injection 1-6 Units  1-6 Units Subcutaneous 4x Daily (AC & HS)   • LORazepam (ATIVAN) tablet 0 5 mg  0 5 mg Oral Q8H PRN   • metoclopramide (REGLAN) injection 5 mg  5 mg Intravenous Q6H PRN   • morphine injection 4 mg  4 mg Intravenous Q4H PRN   • ondansetron (ZOFRAN) injection 4 mg  4 mg Intravenous Q6H PRN   • oxyCODONE (ROXICODONE) IR tablet 5 mg  5 mg Oral Q4H PRN    Or   • oxyCODONE (ROXICODONE) immediate release tablet 10 mg  10 mg Oral Q4H PRN   • sertraline (ZOLOFT) tablet 100 mg  100 mg Oral Daily       Imaging:  Reviewed  Physical Exam:    General: Alert and oriented, in no acute distress  CV: Regular rate   Respiratory: Normal effort, nasal canula in place  Abdominal: Soft, nontender   Extremities: R groin access site is clean and dry  No hematoma or ecchymosis  No evidence of PSA  Bilateral feet with wounds, dressings in place     Neurologic: Grossly normal    Priscilla Tompikns PA-C  11/1/2022

## 2022-11-01 NOTE — ASSESSMENT & PLAN NOTE
· In setting of uncontrolled diabetes and known PAD  · Arterial duplex reviewed, "Great toe pressure of 35 mm Hg, below healing threshold for a diabetic"  · MRI of the foot noted for "a large plantar lateral heel ulcer, without underlying soft tissue abscess  · Received Zosyn x7, no further abx per ID  · Status post arteriogram with angioplasty  · Continue aspirin, Plavix and statin  · Podiatry evaluated, recommending outpatient wound care, no surgical intervention  · IR arteriogram completed on 10/31 left  lower leg arteriogram with proximal and distal SFA shockwave balloon lithotripsy and stent placement, as well as right common iliac artery stent placement  · Repeat SHARIF vascular leads study on 11/01 showed stenosis with increased velocity on the right concerning for stent migration  · Will repeat CTA imaging with runoff

## 2022-11-01 NOTE — ASSESSMENT & PLAN NOTE
· Continue Coreg, blood pressure well controlled  · Continue lisinopril 40 mg and amlodipine 10 mg daily

## 2022-11-01 NOTE — ASSESSMENT & PLAN NOTE
Patient did have episode of shortness of breath, with increasing oxygen requirements  Suspected likely due volume overload, elevated proBNP, x-ray imaging showing edema  Treated with IV Lasix with good urine output and improvement O2 requirement  Will give additional IV Lasix today, hold further diuretics  IV fluids per Nephrology given concern for contrast induced nephropathy

## 2022-11-02 ENCOUNTER — APPOINTMENT (INPATIENT)
Dept: NON INVASIVE DIAGNOSTICS | Facility: HOSPITAL | Age: 62
DRG: 853 | End: 2022-11-02
Payer: COMMERCIAL

## 2022-11-02 LAB
ABO GROUP BLD: NORMAL
ANION GAP SERPL CALCULATED.3IONS-SCNC: 8 MMOL/L (ref 4–13)
APTT PPP: 119 SECONDS (ref 23–37)
APTT PPP: 48 SECONDS (ref 23–37)
APTT PPP: 71 SECONDS (ref 23–37)
BASOPHILS # BLD AUTO: 0.06 THOUSANDS/ÂΜL (ref 0–0.1)
BASOPHILS NFR BLD AUTO: 0 % (ref 0–1)
BLD GP AB SCN SERPL QL: NEGATIVE
BUN SERPL-MCNC: 15 MG/DL (ref 5–25)
CALCIUM SERPL-MCNC: 9 MG/DL (ref 8.3–10.1)
CHLORIDE SERPL-SCNC: 101 MMOL/L (ref 96–108)
CO2 SERPL-SCNC: 31 MMOL/L (ref 21–32)
CREAT SERPL-MCNC: 1.35 MG/DL (ref 0.6–1.3)
EOSINOPHIL # BLD AUTO: 0.6 THOUSAND/ÂΜL (ref 0–0.61)
EOSINOPHIL NFR BLD AUTO: 4 % (ref 0–6)
ERYTHROCYTE [DISTWIDTH] IN BLOOD BY AUTOMATED COUNT: 15.2 % (ref 11.6–15.1)
GFR SERPL CREATININE-BSD FRML MDRD: 42 ML/MIN/1.73SQ M
GLUCOSE SERPL-MCNC: 136 MG/DL (ref 65–140)
GLUCOSE SERPL-MCNC: 146 MG/DL (ref 65–140)
GLUCOSE SERPL-MCNC: 163 MG/DL (ref 65–140)
GLUCOSE SERPL-MCNC: 78 MG/DL (ref 65–140)
GLUCOSE SERPL-MCNC: 83 MG/DL (ref 65–140)
HCT VFR BLD AUTO: 25.4 % (ref 34.8–46.1)
HGB BLD-MCNC: 7.6 G/DL (ref 11.5–15.4)
IMM GRANULOCYTES # BLD AUTO: 0.1 THOUSAND/UL (ref 0–0.2)
IMM GRANULOCYTES NFR BLD AUTO: 1 % (ref 0–2)
LYMPHOCYTES # BLD AUTO: 1.89 THOUSANDS/ÂΜL (ref 0.6–4.47)
LYMPHOCYTES NFR BLD AUTO: 14 % (ref 14–44)
MCH RBC QN AUTO: 27.3 PG (ref 26.8–34.3)
MCHC RBC AUTO-ENTMCNC: 29.9 G/DL (ref 31.4–37.4)
MCV RBC AUTO: 91 FL (ref 82–98)
MONOCYTES # BLD AUTO: 1.02 THOUSAND/ÂΜL (ref 0.17–1.22)
MONOCYTES NFR BLD AUTO: 8 % (ref 4–12)
NEUTROPHILS # BLD AUTO: 9.99 THOUSANDS/ÂΜL (ref 1.85–7.62)
NEUTS SEG NFR BLD AUTO: 73 % (ref 43–75)
NRBC BLD AUTO-RTO: 0 /100 WBCS
PLATELET # BLD AUTO: 316 THOUSANDS/UL (ref 149–390)
PMV BLD AUTO: 8.8 FL (ref 8.9–12.7)
POTASSIUM SERPL-SCNC: 3.7 MMOL/L (ref 3.5–5.3)
RBC # BLD AUTO: 2.78 MILLION/UL (ref 3.81–5.12)
RH BLD: NEGATIVE
SODIUM SERPL-SCNC: 140 MMOL/L (ref 135–147)
SPECIMEN EXPIRATION DATE: NORMAL
WBC # BLD AUTO: 13.66 THOUSAND/UL (ref 4.31–10.16)

## 2022-11-02 PROCEDURE — 99232 SBSQ HOSP IP/OBS MODERATE 35: CPT | Performed by: SURGERY

## 2022-11-02 PROCEDURE — 97116 GAIT TRAINING THERAPY: CPT

## 2022-11-02 PROCEDURE — 86850 RBC ANTIBODY SCREEN: CPT | Performed by: STUDENT IN AN ORGANIZED HEALTH CARE EDUCATION/TRAINING PROGRAM

## 2022-11-02 PROCEDURE — 99232 SBSQ HOSP IP/OBS MODERATE 35: CPT | Performed by: INTERNAL MEDICINE

## 2022-11-02 PROCEDURE — 82948 REAGENT STRIP/BLOOD GLUCOSE: CPT

## 2022-11-02 PROCEDURE — 86901 BLOOD TYPING SEROLOGIC RH(D): CPT | Performed by: STUDENT IN AN ORGANIZED HEALTH CARE EDUCATION/TRAINING PROGRAM

## 2022-11-02 PROCEDURE — 93971 EXTREMITY STUDY: CPT

## 2022-11-02 PROCEDURE — 97530 THERAPEUTIC ACTIVITIES: CPT

## 2022-11-02 PROCEDURE — 86923 COMPATIBILITY TEST ELECTRIC: CPT

## 2022-11-02 PROCEDURE — 93922 UPR/L XTREMITY ART 2 LEVELS: CPT | Performed by: SURGERY

## 2022-11-02 PROCEDURE — 85730 THROMBOPLASTIN TIME PARTIAL: CPT | Performed by: STUDENT IN AN ORGANIZED HEALTH CARE EDUCATION/TRAINING PROGRAM

## 2022-11-02 PROCEDURE — 80048 BASIC METABOLIC PNL TOTAL CA: CPT | Performed by: STUDENT IN AN ORGANIZED HEALTH CARE EDUCATION/TRAINING PROGRAM

## 2022-11-02 PROCEDURE — 85025 COMPLETE CBC W/AUTO DIFF WBC: CPT | Performed by: STUDENT IN AN ORGANIZED HEALTH CARE EDUCATION/TRAINING PROGRAM

## 2022-11-02 PROCEDURE — 99232 SBSQ HOSP IP/OBS MODERATE 35: CPT | Performed by: STUDENT IN AN ORGANIZED HEALTH CARE EDUCATION/TRAINING PROGRAM

## 2022-11-02 PROCEDURE — 99223 1ST HOSP IP/OBS HIGH 75: CPT | Performed by: STUDENT IN AN ORGANIZED HEALTH CARE EDUCATION/TRAINING PROGRAM

## 2022-11-02 PROCEDURE — 93926 LOWER EXTREMITY STUDY: CPT | Performed by: SURGERY

## 2022-11-02 PROCEDURE — 86900 BLOOD TYPING SEROLOGIC ABO: CPT | Performed by: STUDENT IN AN ORGANIZED HEALTH CARE EDUCATION/TRAINING PROGRAM

## 2022-11-02 RX ORDER — INSULIN LISPRO 100 [IU]/ML
5 INJECTION, SOLUTION INTRAVENOUS; SUBCUTANEOUS
Status: DISCONTINUED | OUTPATIENT
Start: 2022-11-02 | End: 2022-11-03

## 2022-11-02 RX ORDER — INSULIN GLARGINE 100 [IU]/ML
15 INJECTION, SOLUTION SUBCUTANEOUS
Status: DISCONTINUED | OUTPATIENT
Start: 2022-11-02 | End: 2022-11-08

## 2022-11-02 RX ADMIN — ASPIRIN 81 MG CHEWABLE TABLET 81 MG: 81 TABLET CHEWABLE at 08:34

## 2022-11-02 RX ADMIN — GABAPENTIN 200 MG: 100 CAPSULE ORAL at 08:34

## 2022-11-02 RX ADMIN — INSULIN LISPRO 5 UNITS: 100 INJECTION, SOLUTION INTRAVENOUS; SUBCUTANEOUS at 17:09

## 2022-11-02 RX ADMIN — GABAPENTIN 200 MG: 100 CAPSULE ORAL at 17:08

## 2022-11-02 RX ADMIN — SERTRALINE HYDROCHLORIDE 100 MG: 100 TABLET ORAL at 08:33

## 2022-11-02 RX ADMIN — OXYCODONE HYDROCHLORIDE 10 MG: 10 TABLET ORAL at 02:56

## 2022-11-02 RX ADMIN — OXYCODONE HYDROCHLORIDE 10 MG: 10 TABLET ORAL at 13:56

## 2022-11-02 RX ADMIN — NYSTATIN: 100000 POWDER TOPICAL at 08:35

## 2022-11-02 RX ADMIN — CLOPIDOGREL BISULFATE 75 MG: 75 TABLET ORAL at 08:34

## 2022-11-02 RX ADMIN — ATORVASTATIN CALCIUM 40 MG: 40 TABLET, FILM COATED ORAL at 16:28

## 2022-11-02 RX ADMIN — HEPARIN SODIUM 4400 UNITS: 1000 INJECTION INTRAVENOUS; SUBCUTANEOUS at 16:30

## 2022-11-02 RX ADMIN — INSULIN LISPRO 5 UNITS: 100 INJECTION, SOLUTION INTRAVENOUS; SUBCUTANEOUS at 12:38

## 2022-11-02 RX ADMIN — INSULIN LISPRO 1 UNITS: 100 INJECTION, SOLUTION INTRAVENOUS; SUBCUTANEOUS at 22:15

## 2022-11-02 RX ADMIN — OXYCODONE HYDROCHLORIDE 10 MG: 10 TABLET ORAL at 19:54

## 2022-11-02 RX ADMIN — CARVEDILOL 25 MG: 25 TABLET, FILM COATED ORAL at 16:28

## 2022-11-02 RX ADMIN — INSULIN GLARGINE 15 UNITS: 100 INJECTION, SOLUTION SUBCUTANEOUS at 22:15

## 2022-11-02 RX ADMIN — HEPARIN SODIUM 15 UNITS/KG/HR: 10000 INJECTION, SOLUTION INTRAVENOUS at 13:57

## 2022-11-02 RX ADMIN — NYSTATIN: 100000 POWDER TOPICAL at 17:09

## 2022-11-02 RX ADMIN — MORPHINE SULFATE 4 MG: 4 INJECTION INTRAVENOUS at 16:23

## 2022-11-02 NOTE — UTILIZATION REVIEW
Continued Stay Review    Date: 11/2/22                          Current Patient Class: Inpatient  Current Level of Care: Med Surg    HPI:62 y o  female initially admitted on 10/22    Assessment/Plan: Repeat imaging show increased velocity with flow limiting dissection of right lower limb  Vascular planning for possible right femoral endarterectomy, vein mapping ordered  Cardiology consult for pre-op clearance  Continue with Plavix, aspirin, statin therapy  MRI LLE without evidence of osteomyelitis, discontinue abx  Change to lantus 15 units bedtime, add 5 units TID with meals      Vital Signs:     Date/Time Temp Pulse Resp MAP (mmHg) SpO2 Calculated FIO2 (%) - Nasal Cannula Nasal Cannula O2 Flow Rate (L/min) O2 Device   11/02/22 07:40:24 99 °F (37 2 °C) 79 18 82 98 % -- -- --   11/02/22 06:09:06 98 9 °F (37 2 °C) 80 21 108 90 % -- -- --   11/02/22 02:52:19 99 6 °F (37 6 °C) 84 22 95 88 % Abnormal  28 2 L/min Nasal cannula   11/01/22 23:28:29 100 2 °F (37 9 °C) 82 20 108 92 % 28 2 L/min Nasal cannula   11/01/22 2054 -- -- -- -- -- 28 2 L/min Nasal cannula   11/01/22 19:52:52 99 5 °F (37 5 °C) 81 20 103 92 % -- -- --   11/01/22 17:04:58 -- 89 -- 92 89 % Abnormal  -- -- --   11/01/22 16:07:12 98 7 °F (37 1 °C) 86 20 103 86 % Abnormal  -- -- --   11/01/22 11:24:11 98 9 °F (37 2 °C) 82 20 91 98 % -- -- --   11/01/22 09:19:09 -- 82 -- 82 95 % -- -- --   11/01/22 07:27:17 98 6 °F (37 °C) 81 18 80 92 % -- -- --   11/01/22 0710 -- -- -- -- -- 28 2 L/min Nasal cannula   10/31/22 19:40:12 97 8 °F (36 6 °C) 82 18 92 90 % -- -- Nasal cannula   10/31/22 18:01:04 97 2 °F (36 2 °C) Abnormal  88 -- 94 90 % -- -- --   10/31/22 18:00:48 97 2 °F (36 2 °C) Abnormal  85 -- 94 89 % Abnormal  -- -- --   10/31/22 1642 -- 84 30 Abnormal  -- 94 % -- -- --   10/31/22 1641 -- 84 23 Abnormal  -- 83 % Abnormal  -- -- --   10/31/22 1640 -- 84 28 Abnormal  -- 95 % -- -- --   10/31/22 1639 -- 85 23 Abnormal  -- 97 % -- -- --   10/31/22 1638 -- 83 30 Abnormal  -- 96 % -- -- --   10/31/22 16:37:54 -- -- -- -- -- -- -- --   10/31/22 1637 -- 82 26 Abnormal  -- 98 % -- -- --   10/31/22 1636 -- 80 22 -- 97 % -- -- --   10/31/22 1635 -- 83 21 -- 95 % -- -- --   10/31/22 1634 -- 84 20 106 91 % 52 8 L/min Simple mask   10/31/22 1633 -- 85 30 Abnormal  -- 78 % Abnormal  -- -- --   10/31/22 1632 -- 96 33 Abnormal  -- 89 % Abnormal  -- -- --   10/31/22 1631 -- 86 24 Abnormal  -- 94 % -- -- --   10/31/22 1630 -- 87 30 Abnormal  -- 90 % -- -- --   10/31/22 1629 -- 85 24 Abnormal  146 81 % Abnormal  52 8 L/min Simple mask   10/31/22 1628 -- 83 15 -- 86 % Abnormal  -- -- --   10/31/22 1627 -- 83 21 -- 88 % Abnormal  -- -- --   10/31/22 1626 -- 83 18 -- 88 % Abnormal  -- -- --   10/31/22 1625 -- 82 23 Abnormal  -- 88 % Abnormal  -- -- --   10/31/22 1624 -- 82 19 122 90 % 52 8 L/min Simple mask   10/31/22 1623 -- 82 26 Abnormal  -- 94 % -- -- --   10/31/22 1622 -- 83 21 -- 98 % -- -- --   10/31/22 1621 -- 82 23 Abnormal  -- 96 % -- -- --   10/31/22 1620 -- 83 20 -- 94 % -- -- --   10/31/22 16:19:58 -- -- -- -- -- -- -- --   10/31/22 1619 -- 84 27 Abnormal  106 90 % 32 3 L/min Nasal cannula   10/31/22 1618 -- 83 27 Abnormal  -- 92 % -- -- --   10/31/22 1617 -- 83 20 -- 90 % -- -- --   10/31/22 1616 -- 84 20 -- 80 % Abnormal  -- -- --   10/31/22 1615 -- 86 19 -- 79 % Abnormal  -- -- --   10/31/22 1517 -- 78 31 Abnormal  -- 93 % -- -- --   10/31/22 1516 -- 78 26 Abnormal  -- 93 % -- -- --   10/31/22 1515 -- 76 18 -- 93 % -- -- --   10/31/22 1514 -- 76 15 114 93 % 32 3 L/min --   10/31/22 1509 -- 75 25 Abnormal  118 92 % 32 3 L/min --   10/31/22 1508 -- 76 15 -- 94 % -- -- --   10/31/22 1507 -- 76 20 -- 94 % -- -- --   10/31/22 1506 -- 76 23 Abnormal  -- 93 % -- -- --   10/31/22 1505 -- 75 17 -- 93 % -- -- --   10/31/22 1504 -- 75 13 92 93 % 32 3 L/min --   10/31/22 1503 -- 77 26 Abnormal  -- 92 % -- -- --   10/31/22 1459 -- 75 15 113 93 % 32 3 L/min --   10/31/22 1454 -- 75 15 117 93 % 32 3 L/min --   10/31/22 1450 -- 125 Abnormal  33 Abnormal  -- 93 % -- -- --   10/31/22 1329 -- 76 15 128 95 % 32 3 L/min Nasal cannula   10/31/22 1325 -- 77 11 Abnormal  -- 96 % -- -- --   10/31/22 1324 -- 77 14 117 95 % 32 3 L/min Nasal cannula   10/31/22 1323 -- 77 8 Abnormal  -- 96 % -- -- --   10/31/22 1322 -- 77 11 Abnormal  -- 97 % -- -- --   10/31/22 1319 -- 77 14 111 95 % 32 3 L/min Nasal cannula   10/31/22 0826 -- 77 -- -- -- -- -- --   10/31/22 0700 97 3 °F (36 3 °C) Abnormal  86 18 99 90 % -- -- None (Room air)         Pertinent Labs/Diagnostic Results:     11/1 VAS SHARIF & waveform analysis:  Impression  RIGHT LOWER LIMB  Ankle/Brachial Index:0 51 wiyh in severe limits (prior 0 63)  PPG/PVR Tracings are dampened  Metatarsal Pressure 45 mmHg  Great Toe Pressure: 50 mmHg, within the healing range  LEFT LOWER LIMB  Ankle/Brachial Index: 0 74 within moderate limits (prior 0 63)  PPG/PVR Tracings are dampened  Metatarsal Pressure 85 mmHg  Great Toe Pressure: 37 mmHg, below the healing range  10/31 CXR:  IMPRESSION:     Pulmonary edema has increased since October 26, 2022     10/31 IR aortagram:    IMPRESSION:     Left lower leg arteriogram with proximal and distal SFA shockwave balloon lithotripsy and stent placement, as well as right common iliac artery stent placement  10/30 Echo: Interpretation Summary       •  Left Ventricle: Left ventricular cavity size is normal  There is mild to moderate concentric hypertrophy  The left ventricular ejection fraction is 60% by visual estimation  Systolic function is normal  Wall motion is normal  Diastolic function is normal   •  Right Ventricle: Right ventricular cavity size is normal  Systolic function is normal   •  Aortic Valve: There is aortic sclerosis without clear visualization of the valve leaflets  •  Mitral Valve: There is mild annular calcification  There is trace regurgitation  •  Tricuspid Valve: There is trace regurgitation   Pulmonary artery systolic pressures are estimated at 31 mm Hg  •  There is no study for comparison  10/27 CTA Abd:  IMPRESSION:     1  Moderate stenosis of the mid infrarenal abdominal aorta secondary to calcific atherosclerotic disease      2  LEFT: Up to 50% stenosis of the proximal common iliac artery  The proximal and distal 3rd of the superficial femoral artery demonstrates 50-75% stenosis  Popliteal artery demonstrates greater than 75% stenosis in the P2 segment  Patent   three-vessel runoff extending into the left foot      3  RIGHT: Proximal common iliac artery stenosis of up to 50%  50% stenosis of the common femoral artery  Superficial femoral artery demonstrates up to 50% stenosis within the proximal several centimeters, 50-75% stenosis throughout the distal 3rd of   the vessel  Popliteal artery demonstrates scattered stenoses of 50-75%  Patent three-vessel runoff extending into the right foot  10/27 MRI L ankle/heel:  IMPRESSION:     Exam moderately limited due to patient motion      There is a large plantar lateral heel ulcer, without underlying soft tissue abscess (series 5 image 5-15 )     Associated with this ulcer, there is only very minimal reactive marrow edema in the calcaneal tubercle evident on T2-weighted sequences (series 3 image 2, series 7 image 8 ) No confluent T1-weighted marrow abnormality or cortical destruction to suggest osteomyelitis        Results from last 7 days   Lab Units 11/02/22  0607 11/01/22 2327 11/01/22  0445 10/31/22  0536 10/30/22  0504   WBC Thousand/uL 13 66* 12 64* 13 43* 12 59* 12 90*   HEMOGLOBIN g/dL 7 6* 7 1* 8 0* 7 8* 7 8*   HEMATOCRIT % 25 4* 23 0* 26 1* 25 7* 25 5*   PLATELETS Thousands/uL 316 282 316 371 368   NEUTROS ABS Thousands/µL 9 99*  --   --  8 78* 9 42*         Results from last 7 days   Lab Units 11/02/22  0607 11/01/22  0445 10/31/22  0536 10/30/22  0504 10/29/22  0537   SODIUM mmol/L 140 139 138 137 140   POTASSIUM mmol/L 3 7 3 8 4 4 4 5 4 3 CHLORIDE mmol/L 101 102 103 103 105   CO2 mmol/L 31 30 28 28 26   ANION GAP mmol/L 8 7 7 6 9   BUN mg/dL 15 20 20 20 17   CREATININE mg/dL 1 35* 1 26 1 38* 1 39* 1 28   EGFR ml/min/1 73sq m 42 46 41 40 45   CALCIUM mg/dL 9 0 8 9 9 4 9 2 9 2   MAGNESIUM mg/dL  --   --  1 7  --   --    PHOSPHORUS mg/dL  --   --  5 5*  --   --          Results from last 7 days   Lab Units 11/02/22  0736 11/01/22  2058 11/01/22  1601 11/01/22  1118 11/01/22  0724 11/01/22  0618 10/31/22  2106 10/31/22  1806 10/31/22  1040 10/31/22  0709 10/31/22  0658 10/30/22  2103   POC GLUCOSE mg/dl 78 170* 174* 152* 192* 223* 208* 90 101 105 104 164*     Results from last 7 days   Lab Units 11/02/22  0607 11/01/22  0445 10/31/22  0536 10/30/22  0504 10/29/22  0537 10/28/22  0455 10/27/22  0554 10/26/22  1125   GLUCOSE RANDOM mg/dL 83 263* 106 110 51* 95 70 79         Results from last 7 days   Lab Units 11/02/22  0607 11/01/22  2327 10/31/22  0536   PROTIME seconds  --  14 6* 13 5   INR   --  1 14 1 03   PTT seconds 119* 36 33         Results from last 7 days   Lab Units 10/31/22  1730   NT-PRO BNP pg/mL 1,348*         Results from last 7 days   Lab Units 10/27/22  2224   SODIUM UR  67   CREATININE UR mg/dL 58 8         Medications:   Scheduled Medications:  amLODIPine, 10 mg, Oral, Daily  aspirin, 81 mg, Oral, Daily  atorvastatin, 40 mg, Oral, Daily With Dinner  carvedilol, 25 mg, Oral, BID With Meals  clopidogrel, 75 mg, Oral, Daily  gabapentin, 200 mg, Oral, BID  insulin glargine, 30 Units, Subcutaneous, HS  insulin lispro, 1-6 Units, Subcutaneous, 4x Daily (AC & HS)  nystatin, , Topical, BID  sertraline, 100 mg, Oral, Daily      Continuous IV Infusions:  heparin (porcine), 3-30 Units/kg/hr (Order-Specific), Intravenous, Titrated      PRN Meds:  acetaminophen, 650 mg, Oral, Q6H PRN  albuterol, 2 puff, Inhalation, Q4H PRN  heparin (porcine), 4,400 Units, Intravenous, Q1H PRN  heparin (porcine), 8,800 Units, Intravenous, Q1H PRN  hydrALAZINE, 10 mg, Intravenous, Q6H PRN  LORazepam, 0 5 mg, Oral, Q8H PRN  metoclopramide, 5 mg, Intravenous, Q6H PRN  morphine injection, 4 mg, Intravenous, Q4H PRN  ondansetron, 4 mg, Intravenous, Q6H PRN  oxyCODONE, 5 mg, Oral, Q4H PRN   Or  oxyCODONE, 10 mg, Oral, Q4H PRN        Discharge Plan: D    Network Utilization Review Department  ATTENTION: Please call with any questions or concerns to [phone] and carefully listen to the prompts so that you are directed to the right person  All voicemails are confidential   Valeta Pastel all requests for admission clinical reviews, approved or denied determinations and any other requests to dedicated fax number below belonging to the campus where the patient is receiving treatment   List of dedicated fax numbers for the Facilities:  [address] DENIALS (Administrative/Medical Necessity) [phone]   [address] (Maternity/NICU/Pediatrics) [phone]   [address] [phone]   [place] [address] [phone]   [address] [place] [address] [facility] [place] [place] [address] Tona Azar [facility] 28 [phone]   [address] [place] Layla Killian [facility] 134 [address] [facility] [phone]

## 2022-11-02 NOTE — PLAN OF CARE
Problem: Potential for Falls  Goal: Patient will remain free of falls  Description: INTERVENTIONS:  - Educate patient/family on patient safety including physical limitations  - Instruct patient to call for assistance with activity   - Consult OT/PT to assist with strengthening/mobility   - Keep Call bell within reach  - Keep bed low and locked with side rails adjusted as appropriate  - Keep care items and personal belongings within reach  - Initiate and maintain comfort rounds  - Make Fall Risk Sign visible to staff  - Offer Toileting every 2 Hours, in advance of need  - Initiate/Maintain bed  chair alarm  - Obtain necessary fall risk management equipment: bed chair alarm, call bell, gripper sock, walker, bedside commode  - Apply yellow socks and bracelet for high fall risk patients  - Consider moving patient to room near nurses station  Outcome: Progressing     Problem: MOBILITY - ADULT  Goal: Maintain or return to baseline ADL function  Description: INTERVENTIONS:  -  Assess patient's ability to carry out ADLs; assess patient's baseline for ADL function and identify physical deficits which impact ability to perform ADLs (bathing, care of mouth/teeth, toileting, grooming, dressing, etc )  - Assess/evaluate cause of self-care deficits   - Assess range of motion  - Assess patient's mobility; develop plan if impaired  - Assess patient's need for assistive devices and provide as appropriate  - Encourage maximum independence but intervene and supervise when necessary  - Involve family in performance of ADLs  - Assess for home care needs following discharge   - Consider OT consult to assist with ADL evaluation and planning for discharge  - Provide patient education as appropriate  Outcome: Progressing  Goal: Maintains/Returns to pre admission functional level  Description: INTERVENTIONS:  - Perform BMAT or MOVE assessment daily    - Set and communicate daily mobility goal to care team and patient/family/caregiver     - Collaborate with rehabilitation services on mobility goals if consulted  - Assist patient in repositioning every 2 hours    - Dangle patient 3 times a day  - Stand patient 3 times a day  - Out of bed to chair as tolerated  - Out of bed for meals  - Out of bed for toileting  - Record patient progress and toleration of activity level   Outcome: Progressing     Problem: PAIN - ADULT  Goal: Verbalizes/displays adequate comfort level or baseline comfort level  Description: Interventions:  - Encourage patient to monitor pain and request assistance  - Assess pain using appropriate pain scale  - Administer analgesics based on type and severity of pain and evaluate response  - Implement non-pharmacological measures as appropriate and evaluate response  - Consider cultural and social influences on pain and pain management  - Notify physician/advanced practitioner if interventions unsuccessful or patient reports new pain  Outcome: Progressing     Problem: INFECTION - ADULT  Goal: Absence or prevention of progression during hospitalization  Description: INTERVENTIONS:  - Assess and monitor for signs and symptoms of infection  - Monitor lab/diagnostic results  - Monitor all insertion sites, i e  indwelling lines, tubes, and drains  - Administer medications as ordered  - Instruct and encourage patient and family to use good hand hygiene technique  Outcome: Progressing

## 2022-11-02 NOTE — ASSESSMENT & PLAN NOTE
Lab Results   Component Value Date    HGBA1C 9 8 (H) 10/25/2022     Recent Labs     11/01/22  1601 11/01/22 2058 11/02/22  0736 11/02/22  1130   POCGLU 174* 170* 78 146*     · Uncontrolled with A1C at 9 8  · Blood glucose controlled on lantus 30 units daily  · Change to lantus 15 units bedtime, add 5 units TID with meals  · Continue insulin sliding scale

## 2022-11-02 NOTE — PROGRESS NOTES
[facility]  Progress Note - [address] 1960, 65 y o  female MRN: [id number]  Unit/Bed#: [address] 68 2 [address] 87 221-02 Encounter: [id number]  Primary Care Provider: ALEX Sapp   Date and time admitted to hospital: 10/21/2022  7:58 PM    PAD (peripheral artery disease) [facility])  Assessment & Plan  65year old female former smoker w/HTN, HLD, uncontrolled type II DM (A1c 9 8), hx CVA, presenting with nonhealing extensive L heel ulceration and R hallux and 5th digit ulceration  Vascular surgery consulted for input  S/p abdominal aortogram, LLE run off w/R ALLISON PTA/stent and LLE SFA shockwave angioplasty/FATOU 10/31/22 (IR-[facility])    -Post intervention SHARIF 11/1/22: Rt: 0 51 (0 80) Lt: 0 74/85/37  -CTA abd/pelvis w/ run off 11/1/22: Flow-limiting dissection flap at the prior arteriotomy site in the R CFA superimposed upon moderate to severe atherosclerotic disease  Focal severe atherosclerotic narrowing at the adductor canal with additional moderate atherosclerotic narrowing throughout the remainder of the R SFA    sCr/eGFR: 1 35/42    Recommendations:  -Bilateral tissue loss in the setting of uncontrolled type II DM and NYDIA on admission, now s/p angiogram w/intervention  Groin access is soft without hematoma or ecchymosis  -S/p angiogram 10/31/22 patient noted to be in acute respiratoy distress and hypoxic, requiring 10L O2 nonrebreather to keep O2 sat >90  Felt to be secondary to fluid overload  Patient diuresed and continues to improve clinically and weaning O2 as tolerated  -D/w Dr Lucia Henry, L heel ulceration stable without OM  -Now s/p LLE angiogram w/ R ALLISON PTA/stent and L SFA shockwave angioplasty and FATOU  Two vessel run off to the LLE via AT/peroneal  PT chronically occluded   -Post intervention SHARIF obtained which did show improvement in LLE SHARIF however decrease in R SHARIF   Duplex obtained which did confirm elevated velocities of the R CFA- CTA obtained  -CTA reviewed which demonstrates flow limiting dissection of the R CFA likely secondary to closure device injury   -Patient will require surgical intervention to include R femoral endarterectomy and repair, with SFA intervention    -Cardiology consult for risk factor modification  -Vein mapping   -Medical management with ASA, statin, Plavix  -Continue local wound care per podiatry  -Medical management and glucose control per SLIM  -D/w Dr Lakshmi Ayers    Subjective:  Patient reports intermittent pain of the right lower extremity  She remains motor/sensory intact  Discussed plan for upcoming surgery which patient is in agreement for  Timing to be determined  Remains hemodynamically stable    Vitals:  /66   Pulse 79   Temp 99 °F (37 2 °C)   Resp 18   Ht 5' 3 75" (1 619 m)   Wt 110 kg (242 lb 1 oz)   SpO2 98%   BMI 41 88 kg/m²     I/Os:  I/O last 3 completed shifts:  In: -   Out: 1300 [Urine:1300]  No intake/output data recorded  Lab Results and Cultures:   CBC with diff:   Lab Results   Component Value Date    WBC 13 66 (H) 11/02/2022    HGB 7 6 (L) 11/02/2022    HCT 25 4 (L) 11/02/2022    MCV 91 11/02/2022     11/02/2022    MCH 27 3 11/02/2022    MCHC 29 9 (L) 11/02/2022    RDW 15 2 (H) 11/02/2022    MPV 8 8 (L) 11/02/2022    NRBC 0 11/02/2022   ,   BMP/CMP:  Lab Results   Component Value Date    SODIUM 140 11/02/2022    K 3 7 11/02/2022     11/02/2022    CO2 31 11/02/2022    BUN 15 11/02/2022    CREATININE 1 35 (H) 11/02/2022    CALCIUM 9 0 11/02/2022    AST 39 10/24/2022    ALT 26 10/24/2022    ALKPHOS 100 10/24/2022    EGFR 42 11/02/2022   ,   Lipid Panel: No results found for: CHOL,   Coags:   Lab Results   Component Value Date     (H) 11/02/2022    INR 1 14 11/01/2022   ,     Blood Culture:   Lab Results   Component Value Date    BLOODCX No Growth After 5 Days   10/21/2022   ,   Urinalysis:   Lab Results   Component Value Date    COLORU Light Yellow 10/21/2022    CLARITYU Turbid 10/21/2022 SPECGRAV >=1 030 10/21/2022    PHUR 6 0 10/21/2022    PHUR 6 5 02/23/2018    LEUKOCYTESUR Negative 10/21/2022    NITRITE Negative 10/21/2022    GLUCOSEU 250 (1/4%) (A) 10/21/2022    KETONESU Negative 10/21/2022    BILIRUBINUR Negative 10/21/2022    BLOODU Large (A) 10/21/2022   ,   Urine Culture:   Lab Results   Component Value Date    URINECX >100,000 cfu/ml Escherichia coli (A) 10/21/2022   ,   Wound Culure: No results found for: WOUNDCULT    Medications:  Current Facility-Administered Medications   Medication Dose Route Frequency   • acetaminophen (TYLENOL) tablet 650 mg  650 mg Oral Q6H PRN   • albuterol (PROVENTIL HFA,VENTOLIN HFA) inhaler 2 puff  2 puff Inhalation Q4H PRN   • amLODIPine (NORVASC) tablet 10 mg  10 mg Oral Daily   • aspirin chewable tablet 81 mg  81 mg Oral Daily   • atorvastatin (LIPITOR) tablet 40 mg  40 mg Oral Daily With Dinner   • carvedilol (COREG) tablet 25 mg  25 mg Oral BID With Meals   • clopidogrel (PLAVIX) tablet 75 mg  75 mg Oral Daily   • gabapentin (NEURONTIN) capsule 200 mg  200 mg Oral BID   • heparin (porcine) 25,000 units in 0 45% NaCl 250 mL infusion (premix)  3-30 Units/kg/hr (Order-Specific) Intravenous Titrated   • heparin (porcine) injection 4,400 Units  4,400 Units Intravenous Q1H PRN   • heparin (porcine) injection 8,800 Units  8,800 Units Intravenous Q1H PRN   • hydrALAZINE (APRESOLINE) injection 10 mg  10 mg Intravenous Q6H PRN   • insulin glargine (LANTUS) subcutaneous injection 30 Units 0 3 mL  30 Units Subcutaneous HS   • insulin lispro (HumaLOG) 100 units/mL subcutaneous injection 1-6 Units  1-6 Units Subcutaneous 4x Daily (AC & HS)   • LORazepam (ATIVAN) tablet 0 5 mg  0 5 mg Oral Q8H PRN   • metoclopramide (REGLAN) injection 5 mg  5 mg Intravenous Q6H PRN   • morphine injection 4 mg  4 mg Intravenous Q4H PRN   • nystatin (MYCOSTATIN) powder   Topical BID   • ondansetron (ZOFRAN) injection 4 mg  4 mg Intravenous Q6H PRN   • oxyCODONE (ROXICODONE) IR tablet 5 mg 5 mg Oral Q4H PRN    Or   • oxyCODONE (ROXICODONE) immediate release tablet 10 mg  10 mg Oral Q4H PRN   • sertraline (ZOLOFT) tablet 100 mg  100 mg Oral Daily       Imaging:  Reviewed  Physical Exam:    General: Alert and oriented   In no acute distress  CV: Regular rate  Respiratory: Normal effort  Abdominal: Soft, non-tender   Extremities: Bilateral feet are warm and motor/sensory intact   Neurologic: Grossly normal      Pia Davis PA-C  11/2/2022

## 2022-11-02 NOTE — PROGRESS NOTES
NEPHROLOGY PROGRESS NOTE   Abbeyl Derek 65 y o  female MRN: [id number]  Unit/Bed#: [address] 68 2 [address] 87 221-02 Encounter: [id number]      ASSESSMENT & PLAN:  1  Acute kidney injury, 1st episode creatinine peak at 1 44 and went down to 1 1, patient had a 2nd episode with creatinine rise on 10/30 to 1 39  Baseline creatinine around 0 7-1 1 since 2020  Status post intravenously fluids pre and post arteriogram on 10/31, received 180 cc of dye  Status post CTA on 11/01, received 130 cc of dye intravenously dye  Keep holding Ace inhibitors and diuretics for now  Noted creatinine increased to 1 35 from 1 26 yesterday  Follow daily labs    2  Left lower extremity wound, podiatry and vascular on board  Status post IR aortogram with runoff, left lower leg arteriogram with proximal and distal SFA shockwave lithotripsy and stent placement as well as right common iliac artery stent placement on 10/31  As per IR notes patient received 180 cc of intravenously dye  Status post CTA yesterday due to concern for stent migration, receive another 130 cc of dye on 11/01  Follow daily labs    3  Hypertension, blood pressure acceptable, keep holding Ace inhibitors for now  Follow daily labs  Continue with Norvasc and Coreg  4  Lower extremity edema, hold diuretics in anticipation of surgery tomorrow  Follow low-salt diet     5  Anemia, monitor H&H    My plan and recommendation were discussed with Internal Medicine attending    SUBJECTIVE:  Patient seen and examined, denies significant chest pain or shortness of breath, no abdominal pain, no nausea, no vomiting  Status post CTA yesterday due to concern for possible stent migration          OBJECTIVE:  Current Weight: Weight - Scale: 110 kg (242 lb 1 oz)  Vitals:    11/02/22 0740   BP: 113/66   Pulse: 79   Resp: 18   Temp: 99 °F (37 2 °C)   SpO2: 98%       Intake/Output Summary (Last 24 hours) at 11/2/2022 1026  Last data filed at 11/2/2022 8120  Gross per 24 hour   Intake --   Output 1300 ml   Net -1300 ml     General: conscious, cooperative, in not acute distress  Eyes: conjunctivae pale, anicteric sclerae  ENT: lips and mucous membranes moist, oxygen nasal cannula  Neck: supple, no JVD  Chest: clear breath sounds bilateral, no crackles, ronchus or wheezings  CVS: distinct S1 & S2, normal rate, regular rhythm  Abdomen:  Obese, non-tender, non-distended, normoactive bowel sounds  Extremities: + edema of both legs  Skin: no rash  Neuro: awake, alert, oriented        Medications:    Current Facility-Administered Medications:   •  acetaminophen (TYLENOL) tablet 650 mg, 650 mg, Oral, Q6H PRN, Sugey Wiseman PA-C, 650 mg at 10/23/22 1751  •  albuterol (PROVENTIL HFA,VENTOLIN HFA) inhaler 2 puff, 2 puff, Inhalation, Q4H PRN, Amy Parry MD, 2 puff at 10/30/22 0611  •  amLODIPine (NORVASC) tablet 10 mg, 10 mg, Oral, Daily, Verle Common, CRNP, 10 mg at 10/31/22 9414  •  aspirin chewable tablet 81 mg, 81 mg, Oral, Daily, Sugey Wiseman PA-C, 81 mg at 11/02/22 4361  •  atorvastatin (LIPITOR) tablet 40 mg, 40 mg, Oral, Daily With Dinner, Choco Maddox MD, 40 mg at 11/01/22 1706  •  carvedilol (COREG) tablet 25 mg, 25 mg, Oral, BID With Meals, Xavier Gordillo, CRNP, 25 mg at 11/01/22 1706  •  clopidogrel (PLAVIX) tablet 75 mg, 75 mg, Oral, Daily, Paulo Hardin PA-C, 75 mg at 11/02/22 6968  •  gabapentin (NEURONTIN) capsule 200 mg, 200 mg, Oral, BID, Sugey Wiseman PA-C, 200 mg at 11/02/22 9016  •  heparin (porcine) 25,000 units in 0 45% NaCl 250 mL infusion (premix), 3-30 Units/kg/hr (Order-Specific), Intravenous, Titrated, Niraj Gardner DO, Last Rate: 16 5 mL/hr at 11/02/22 0832, 15 Units/kg/hr at 11/02/22 0832  •  heparin (porcine) injection 4,400 Units, 4,400 Units, Intravenous, Q1H PRN, Niraj Gardner, DO  •  heparin (porcine) injection 8,800 Units, 8,800 Units, Intravenous, Q1H PRN, Niraj Gardner, DO  •  hydrALAZINE (APRESOLINE) injection 10 mg, 10 mg, Intravenous, Q6H PRN, Sugey Wiseman PA-C, 10 mg at 10/27/22 1857  •  insulin glargine (LANTUS) subcutaneous injection 30 Units 0 3 mL, 30 Units, Subcutaneous, HS, Amy Parry MD, 30 Units at 11/01/22 2204  •  insulin lispro (HumaLOG) 100 units/mL subcutaneous injection 1-6 Units, 1-6 Units, Subcutaneous, 4x Daily (AC & HS), 1 Units at 11/01/22 2204 **AND** Fingerstick Glucose (POCT), , , 4x Daily AC and at bedtime, Sugey Wiseman PA-C  •  LORazepam (ATIVAN) tablet 0 5 mg, 0 5 mg, Oral, Q8H PRN, Amy Parry MD, 0 5 mg at 10/31/22 2108  •  metoclopramide (REGLAN) injection 5 mg, 5 mg, Intravenous, Q6H PRN, Patel Duncan DO, 5 mg at 10/23/22 9728  •  morphine injection 4 mg, 4 mg, Intravenous, Q4H PRN, Kayley Manley DO, 4 mg at 10/30/22 8116  •  nystatin (MYCOSTATIN) powder, , Topical, BID, Arabella Diaz MD, Given at 11/02/22 0835  •  [facility]ron [facility] injection 4 mg, 4 mg, Intravenous, Q6H PRN, Sugey Wiseman PA-C, 4 mg at 10/22/22 2241  •  oxyCODONE (ROXICODONE) IR tablet 5 mg, 5 mg, Oral, Q4H PRN, 5 mg at 10/31/22 2108 **OR** oxyCODONE (ROXICODONE) immediate release tablet 10 mg, 10 mg, Oral, Q4H PRN, Amy Parry MD, 10 mg at 11/02/22 0256  •  sertraline (ZOLOFT) tablet 100 mg, 100 mg, Oral, Daily, uSgey Wiseman PA-C, 100 mg at 11/02/22 5245    Invasive Devices:        Lab Results:   Results from last 7 days   Lab Units 11/02/22  0607 11/01/22  2327 11/01/22  0445 10/31/22  0536   WBC Thousand/uL 13 66* 12 64* 13 43* 12 59*   HEMOGLOBIN g/dL 7 6* 7 1* 8 0* 7 8*   HEMATOCRIT % 25 4* 23 0* 26 1* 25 7*   PLATELETS Thousands/uL 316 282 316 371   SODIUM mmol/L 140  --  139 138   POTASSIUM mmol/L 3 7  --  3 8 4 4   CHLORIDE mmol/L 101  --  102 103   CO2 mmol/L 31  --  30 28   BUN mg/dL 15  --  20 20   CREATININE mg/dL 1 35*  --  1 26 1 38*   CALCIUM mg/dL 9 0  --  8 9 9 4   MAGNESIUM mg/dL  --   --   --  1 7   PHOSPHORUS mg/dL  --   --   --  5 5*       Previous work up:  See previous notes      Portions of the record may have been created with voice recognition software  Occasional wrong word or "sound a like" substitutions may have occurred due to the inherent limitations of voice recognition software  Read the chart carefully and recognize, using context, where substitutions have occurred  If you have any questions, please contact the dictating provider

## 2022-11-02 NOTE — PHYSICAL THERAPY NOTE
Physical Therapy Treatment Note     11/02/22 1040   PT Last Visit   PT Visit Date 11/02/22   Note Type   Note Type Treatment   Pain Assessment   Pain Assessment Tool 0-10   Pain Score 4   Pain Location/Orientation Orientation: Right;Location: Foot   Restrictions/Precautions   Weight Bearing Precautions Per Order Yes   RLE Weight Bearing Per Order WBAT  (Reported to RN patient maybe better off with surgical shoe on RLE at this time for amb )   LLE Weight Bearing Per Order WBAT  (with heel offload Globoped shoe)   Braces or Orthoses Other (Comment)  (Globoped shoe on LLE, surgical shoe on RLE)   Other Precautions WBS; Fall Risk;Pain;Multiple lines;Telemetry;O2   General   Chart Reviewed Yes   Family/Caregiver Present No   Subjective   Subjective Pt  seated on EOB uon entry  Noted edema in BLEs  Pt  agreeable to PT   Bed Mobility   Sit to Supine 5  Supervision   Additional items Assist x 1;HOB elevated; Bedrails; Increased time required;Verbal cues   Transfers   Sit to Stand 5  Supervision   Additional items Assist x 1; Increased time required;Verbal cues   Stand to Sit 5  Supervision   Additional items Increased time required;Verbal cues   Stand pivot 5  Supervision   Additional items Increased time required   Ambulation/Elevation   Gait pattern Improper Weight shift;Decreased foot clearance; Short stride; Excessively slow; Inconsistent lance; Foward flexed;Decreased heel strike;Decreased toe off   Gait Assistance 5  Supervision   Additional items Assist x 1;Verbal cues   Assistive Device Rolling walker   Distance 20ft   Balance   Static Sitting Good   Dynamic Sitting Fair +   Static Standing Fair   Dynamic Standing Fair -   Ambulatory Fair -   Endurance Deficit   Endurance Deficit Yes   Endurance Deficit Description pain   Activity Tolerance   Activity Tolerance Patient tolerated treatment well   Nurse Made Aware Yes   Assessment   Prognosis Fair   Problem List Decreased strength;Decreased range of motion;Decreased endurance;Decreased mobility;Pain;Decreased skin integrity;Obesity; Impaired judgement   Assessment pt  progressing well with patient  Pt  noted with BLE edema  pt  was fitted with Globoped shoe for LLE due to change in [address] status per podaitry  Recommended to RN SB that patient should probably wear surgical shoe on the RLE for ambulation for protection  Donned sock on RLE and globi ped heel offloading shoe on LLE for ambulation  Pt  reported pain in her RLE and not LLE with ambulation  Also noted weeping near the R small toe post ambulation  Recommended patient elevate her LEs when in bed to decrease edema  Pt  able to perform all transfers with S  Increased time and effort noted for vickieelainejuan m  Talked to [facility] evaluating PT regarding WBing change and reported patient's goals set during PT eval is still appropriate  Will continue to follow patient during the stay to maximize functional mobility,   Goals   Patient Goals None reported   STG Expiration Date 11/08/22   PT Treatment Day 2   Plan   Treatment/Interventions Functional transfer training;Gait training;Bed mobility; Equipment eval/education;Patient/family training;Spoke to nursing;OT  (PT Bella)   Progress Progressing toward goals   PT Frequency 4-6x/wk   Recommendation   PT Discharge Recommendation Post acute rehabilitation services   Equipment Recommended Walker   AM-PAC Basic Mobility Inpatient   Turning in Bed Without Bedrails 3   Lying on Back to Sitting on Edge of Flat Bed 3   Moving Bed to Chair 4   Standing Up From Chair 4   Walk in Room 4   Climb 3-5 Stairs 3   Basic Mobility Inpatient Raw Score 21   Basic Mobility Standardized Score 45 55   Highest Level Of Mobility   JH-HLM Goal 6: Walk 10 steps or more   JH-HLM Achieved 6: Walk 10 steps or more   End of Consult   Patient Position at End of Consult Supine; All needs within reach         Maylon Adrian    An AM-PAC basic mobility standardized score less than 42 9 suggest the patient may benefit from discharge to post-acute rehab services

## 2022-11-02 NOTE — PROGRESS NOTES
[address] [facility]  Progress Note - [facility] 1960, 65 y o  female MRN: [id number]  Unit/Bed#: [facility]al[facility] 87 221-02 Encounter: [id number]  Primary Care Provider: ALEX Caceres   Date and time admitted to hospital: 10/21/2022  7:58 PM    * Non healing left heel wound  Assessment & Plan  · In setting of uncontrolled diabetes and known PAD  · Arterial duplex reviewed, "Great toe pressure of 35 mm Hg, below healing threshold for a diabetic"  · MRI of the foot noted for "a large plantar lateral heel ulcer, without underlying soft tissue abscess  · Received Zosyn x7, no further abx per ID  · Status post arteriogram with angioplasty  · Continue aspirin, Plavix and statin  · Podiatry evaluated, recommending outpatient wound care, no surgical intervention  · IR arteriogram completed on 10/31 left  lower leg arteriogram with proximal and distal SFA shockwave balloon lithotripsy and stent placement, as well as right common iliac artery stent placement  · Repeat imaging show increased velocity with flow limiting dissection of right lower limb  · Vascular planning for possible right femoral endarterectomy, vein mapping ordered  · Cards consulted for pre op clearance    PAD (peripheral artery disease) ([facility] [address] )  Assessment & Plan  · Vascular surgery input appreciated  · Continue with Plavix, aspirin, statin therapy  · Status post arteriogram on 11/01 with angioplasty, with stent placement on left lower leg and right common iliac artery        Hypertensive urgency  Assessment & Plan    · Continue Coreg, blood pressure well controlled  · Continue lisinopril 40 mg and amlodipine 10 mg daily    Wound of lower extremity  Assessment & Plan  MRI LLE without evidence of osteomyelitis, discontinue abx  Follow up with podiatry for continue wound check outpatient    Type 2 diabetes mellitus with hyperglycemia, with long-term current use of insulin [facility])  Assessment & Plan  Lab Results   Component Value Date HGBA1C 9 8 (H) 10/25/2022     Recent Labs     22  1601 22  2058 22  0736 22  1130   POCGLU 174* 170* 78 146*     · Uncontrolled with A1C at 9 8  · Blood glucose controlled on lantus 30 units daily  · Change to lantus 15 units bedtime, add 5 units TID with meals  · Continue insulin sliding scale    Benign essential hypertension  Assessment & Plan  Controlled with resumption of home medications, continue current regimen    Depression, recurrent (HCC)  Assessment & Plan  Continue sertraline, Xanax PRN        VTE Pharmacologic Prophylaxis:   Pharmacologic: Heparin Drip  Mechanical VTE Prophylaxis in Place: Yes    Patient Centered Rounds: I have performed bedside rounds with nursing staff today  Discussions with Specialists or Other Care Team Provider: Vascular    Education and Discussions with Family / Patient: patient    Time Spent for Care: 30 minutes  More than 50% of total time spent on counseling and coordination of care as described above  Current Length of Stay: 11 day(s)    Current Patient Status: Inpatient   Certification Statement: The patient will continue to require additional inpatient hospital stay due to pending vascular surgery    Discharge Plan: pending    Code Status: Level 1 - Full Code      Subjective:   No events overnight  Denies any CP or SOB  Tolerating diet, had been NPO this morning with pending surgical intervention postponed  Objective:     Vitals:   Temp (24hrs), Av 3 °F (37 4 °C), Min:98 7 °F (37 1 °C), Max:100 2 °F (37 9 °C)    Temp:  [98 7 °F (37 1 °C)-100 2 °F (37 9 °C)] 99 °F (37 2 °C)  HR:  [79-89] 79  Resp:  [18-22] 18  BP: (113-177)/(65-97) 113/66  SpO2:  [86 %-98 %] 98 %  Body mass index is 41 88 kg/m²  Input and Output Summary (last 24 hours):        Intake/Output Summary (Last 24 hours) at 2022 1229  Last data filed at 2022 3630  Gross per 24 hour   Intake --   Output 1300 ml   Net -1300 ml       Physical Exam:     Physical Exam  Vitals reviewed  Constitutional:       General: She is not in acute distress  Appearance: Normal appearance  HENT:      Head: Atraumatic  Cardiovascular:      Rate and Rhythm: Regular rhythm  Heart sounds: Normal heart sounds  Pulmonary:      Effort: Pulmonary effort is normal  No respiratory distress  Abdominal:      General: Bowel sounds are normal  There is no distension  Palpations: Abdomen is soft  Musculoskeletal:         General: No swelling  Right lower leg: Edema present  Left lower leg: Edema present  Skin:     General: Skin is warm  Neurological:      Mental Status: She is alert and oriented to person, place, and time  Motor: No weakness  Psychiatric:         Mood and Affect: Mood normal          Additional Data:     Labs:    Results from last 7 days   Lab Units 11/02/22  0607   WBC Thousand/uL 13 66*   HEMOGLOBIN g/dL 7 6*   HEMATOCRIT % 25 4*   PLATELETS Thousands/uL 316   NEUTROS PCT % 73   LYMPHS PCT % 14   MONOS PCT % 8   EOS PCT % 4     Results from last 7 days   Lab Units 11/02/22  0607   SODIUM mmol/L 140   POTASSIUM mmol/L 3 7   CHLORIDE mmol/L 101   CO2 mmol/L 31   BUN mg/dL 15   CREATININE mg/dL 1 35*   ANION GAP mmol/L 8   CALCIUM mg/dL 9 0   GLUCOSE RANDOM mg/dL 83     Results from last 7 days   Lab Units 11/01/22  2327   INR  1 14     Results from last 7 days   Lab Units 11/02/22  1130 11/02/22  0736 11/01/22  2058 11/01/22  1601 11/01/22  1118 11/01/22  0724 11/01/22  0618 10/31/22  2106 10/31/22  1806 10/31/22  1040 10/31/22  0709 10/31/22  0658   POC GLUCOSE mg/dl 146* 78 170* 174* 152* 192* 223* 208* 90 101 105 104                   * I Have Reviewed All Lab Data Listed Above  * Additional Pertinent Lab Tests Reviewed: All Labs For Current Hospital Admission Reviewed    Imaging:    XR chest portable    Result Date: 10/27/2022  Impression: Mild pulmonary vascular congestion   Workstation performed: [id number]     XR chest 1 view portable    Result Date: 10/22/2022  Impression: No acute cardiopulmonary disease  Workstation performed: [id number]     XR foot 3+ views RIGHT    Result Date: 10/22/2022  Impression: No acute osseous abnormality  Workstation performed: [id number]     CT lower extremity w contrast left    Result Date: 10/22/2022  Impression: No acute bony process is seen  Moderate superficial soft tissue edema in the foot, ankle, and leg, superimposed cellulitis considered in the appropriate clinical setting  Correlation with patient's symptoms and laboratory values recommended  No discrete drainable collection identified  Other findings as above   Workstation performed: [id number]       Recent Cultures (last 7 days):           Last 24 Hours Medication List:   Current Facility-Administered Medications   Medication Dose Route Frequency Provider Last Rate   • acetaminophen  650 mg Oral Q6H PRN Sugey Wiseman PA-C     • albuterol  2 puff Inhalation Q4H PRN Amy Parry MD     • amLODIPine  10 mg Oral Daily ALEX Pelletier     • aspirin  81 mg Oral Daily Sugey Wiseman PA-C     • atorvastatin  40 mg Oral Daily With Faheem Cohen MD     • carvedilol  25 mg Oral BID With Meals ALEX Pelletier     • clopidogrel  75 mg Oral Daily Travis Thompson PA-C     • gabapentin  200 mg Oral BID Sugey Wiseman PA-C     • heparin (porcine)  3-30 Units/kg/hr (Order-Specific) Intravenous Titrated Salde Ledezma DO 15 Units/kg/hr (11/02/22 6235)   • heparin (porcine)  4,400 Units Intravenous Q1H PRN Slade Darien, DO     • heparin (porcine)  8,800 Units Intravenous Q1H PRN Slade Darien DO     • hydrALAZINE  10 mg Intravenous Q6H PRN Sugey Wiseman PA-C     • insulin glargine  15 Units Subcutaneous HS Ángel Soriano MD     • insulin lispro  1-6 Units Subcutaneous 4x Daily (AC & HS) Sugey Wiseman PA-C     • insulin lispro  5 Units Subcutaneous TID With Meals Ángel Soriano MD • LORazepam  0 5 mg Oral Q8H PRN Amy Parry MD     • metoclopramide  5 mg Intravenous Q6H PRN Romel Sand, DO     • morphine injection  4 mg Intravenous Q4H PRN Romel Sand, DO     • nystatin   Topical BID Angie Anne MD     • ondansetron  4 mg Intravenous Q6H PRN Sugey Wiseman PA-C     • oxyCODONE  5 mg Oral Q4H PRN Amy Parry MD      Or   • oxyCODONE  10 mg Oral Q4H PRN Benita Gordon MD     • sertraline  100 mg Oral Daily Sugey Valdez PA-C          Today, Patient Was Seen By: Angie Anne    ** Please Note: Dictation voice to text software may have been used in the creation of this document   **

## 2022-11-02 NOTE — NURSING NOTE
RN went into pt room to start heparin gtt at 1900  When trying to pull PTT blood work off Midline, no blood return was noted  RN had 2 other RN's come assess and try midline for blood work  All nurses agreed that left upper arm looked swollen and no blood return was noted  RN made SLIM aware, Jaye Wood  Once ultrasound machine was available, RN used ultrasound to place new peripheral IV  Hep gtt now started  Midline not in use at current time  Will continue to monitor pt

## 2022-11-02 NOTE — PLAN OF CARE
Problem: PHYSICAL THERAPY ADULT  Goal: Performs mobility at highest level of function for planned discharge setting  See evaluation for individualized goals  Description: Treatment/Interventions: Functional transfer training, LE strengthening/ROM, Therapeutic exercise, Endurance training, Patient/family training, Equipment eval/education, Bed mobility, Compensatory technique education, Gait training, Continued evaluation, Spoke to nursing, Spoke to MD, Spoke to case management, OT          See flowsheet documentation for full assessment, interventions and recommendations  Outcome: Progressing  Note: Prognosis: Fair  Problem List: Decreased strength, Decreased range of motion, Decreased endurance, Decreased mobility, Pain, Decreased skin integrity, Obesity, Impaired judgement  Assessment: pt  progressing well with patient  Pt  noted with BLE edema  pt  was fitted with Globoped shoe for LLE due to change in [address] status per podaitry  Recommended to RN SB that patient should probably wear surgical shoe on the RLE for ambulation for protection  Donned sock on RLE and globi ped heel offloading shoe on LLE for ambulation  Pt  reported pain in her RLE and not LLE with ambulation  Also noted weeping near the R small toe post ambulation  Recommended patient elevate her LEs when in bed to decrease edema  Pt  able to perform all transfers with S  Increased time and effort noted for cooper  Talked to [facility] evaluating PT regarding WBing change and reported patient's goals set during PT eval is still appropriate  Will continue to follow patient during the stay to maximize functional mobility,  Barriers to Discharge: Inaccessible home environment, Decreased caregiver support  Barriers to Discharge Comments: lives alone, stairs  PT Discharge Recommendation: Post acute rehabilitation services    See flowsheet documentation for full assessment

## 2022-11-02 NOTE — CASE MANAGEMENT
Case Management Discharge Planning Note    Patient name [facility] 2 [address] Martha Vazquez* MRN [id number]  : 1960 Date 2022       Current Admission Date: 10/21/2022  Current Admission Diagnosis:Non healing left heel wound   Patient Active Problem List    Diagnosis Date Noted   • Shortness of breath 2022   • Acute on chronic anemia 10/30/2022   • Renal insufficiency    • Generalized edema due to fluid overload 10/27/2022   • PAD (peripheral artery disease) ([address] ) 10/25/2022   • NYDIA (acute kidney injury) ([address] ) 10/25/2022   • UTI (urinary tract infection) 10/23/2022   • N&V (nausea and vomiting) 10/22/2022   • Sepsis ([facility] [address] ) 10/22/2022   • Non healing left heel wound 10/22/2022   • Abnormal EKG 10/22/2022   • Hypertensive urgency 10/22/2022   • Wound of lower extremity 10/21/2022   • Epigastric pain 2022   • Type 2 diabetes mellitus with hyperglycemia, with long-term current use of insulin ([facility] [address] ) 10/18/2021   • Hyperparathyroidism ([facility] [address] ) 10/18/2021   • 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 ([facility] [address] ) 2021   • 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   • Moderate protein-calorie malnutrition ([facility] [address] ) 2020   • Acute colitis 2020   • Arthritis 2019   • Depression, recurrent ([address] ) 2018   • Class 3 severe obesity due to excess calories with serious comorbidity and body mass index (BMI) of 40 0 to 44 9 in adult ([facility] [address] ) 2018   • Esophageal reflux 2018   • Uncontrolled type 2 diabetes with neuropathy 2018   • Diabetic peripheral neuropathy ([facility] [address] )    • Systolic murmur    • Cerebral infarction ([facility] [address] ) 2015   • Anxiety 2015   • Vitamin D deficiency 2015   • Benign essential hypertension 2012   • Familial combined hyperlipidemia 2012      LOS (days): 11  Geometric Mean LOS (GMLOS) (days):   Days to GMLOS:     OBJECTIVE:  Risk of Unplanned Readmission Score: 21 53         Current admission status: Inpatient   Preferred Pharmacy:   [id number], [address]  [address]  [facility] [address]  Phone: [phone] Fax: [phone]    Primary Care Provider: ALEX Brunner    Primary Insurance: BLUE CROSS  Secondary Insurance:     DISCHARGE DETAILS:    Additional Comments: Patient reviewed during care coordination rounds with Dr Soledad Alexandre who informed that pt is to get an arteriogram and that Vascular is following  Pt will likely require an additional 48-72 hours inpatient  CM provided STR choice list to pt at bedside, pt reports she plans to review accepting facilities with family when they visit tomorrow  CM department to follow

## 2022-11-02 NOTE — ASSESSMENT & PLAN NOTE
65year old female former smoker w/HTN, HLD, uncontrolled type II DM (A1c 9 8), hx CVA, presenting with nonhealing extensive L heel ulceration and R hallux and 5th digit ulceration  Vascular surgery consulted for input  S/p abdominal aortogram, LLE run off w/R ALLISON PTA/stent and LLE SFA shockwave angioplasty/FATOU 10/31/22 ([facility])    -Post intervention SHARIF 11/1/22: Rt: 0 51 (0 80) Lt: 0 74/85/37  -CTA abd/pelvis w/ run off 11/1/22: Flow-limiting dissection flap at the prior arteriotomy site in the R CFA superimposed upon moderate to severe atherosclerotic disease  Focal severe atherosclerotic narrowing at the adductor canal with additional moderate atherosclerotic narrowing throughout the remainder of the R SFA    sCr/eGFR: 1 35/42    Recommendations:  -Bilateral tissue loss in the setting of uncontrolled type II DM and NYDIA on admission, now s/p angiogram w/intervention  Groin access is soft without hematoma or ecchymosis  -S/p angiogram 10/31/22 patient noted to be in acute respiratoy distress and hypoxic, requiring 10L O2 nonrebreather to keep O2 sat >90  Felt to be secondary to fluid overload  Patient diuresed and continues to improve clinically and weaning O2 as tolerated  -D/w Dr Yesenia Isbell, L heel ulceration stable without OM  -Now s/p LLE angiogram w/ R ALLISON PTA/stent and L SFA shockwave angioplasty and FATOU  Two vessel run off to the LLE via AT/peroneal  PT chronically occluded   -Post intervention SHARIF obtained which did show improvement in LLE SHARIF however decrease in R SHARIF   Duplex obtained which did confirm elevated velocities of the R CFA- CTA obtained  -CTA reviewed which demonstrates flow limiting dissection of the R CFA likely secondary to closure device injury   -Patient will require surgical intervention to include R femoral endarterectomy and repair, with SFA intervention    -Cardiology consult for risk factor modification  -Vein mapping   -Medical management with ASA, statin, Plavix  -Continue local wound care per podiatry  -Medical management and glucose control per SLIM  -D/w Dr Negro Dang

## 2022-11-02 NOTE — ASSESSMENT & PLAN NOTE
MRI LLE without evidence of osteomyelitis, discontinue abx  Follow up with podiatry for continue wound check outpatient

## 2022-11-02 NOTE — ASSESSMENT & PLAN NOTE
· In setting of uncontrolled diabetes and known PAD  · Arterial duplex reviewed, "Great toe pressure of 35 mm Hg, below healing threshold for a diabetic"  · MRI of the foot noted for "a large plantar lateral heel ulcer, without underlying soft tissue abscess  · Received Zosyn x7, no further abx per ID  · Status post arteriogram with angioplasty  · Continue aspirin, Plavix and statin  · Podiatry evaluated, recommending outpatient wound care, no surgical intervention  · IR arteriogram completed on 10/31 left  lower leg arteriogram with proximal and distal SFA shockwave balloon lithotripsy and stent placement, as well as right common iliac artery stent placement  · Repeat imaging show increased velocity with flow limiting dissection of right lower limb  · Vascular planning for possible right femoral endarterectomy, vein mapping ordered  · Cards consulted for pre op clearance

## 2022-11-02 NOTE — CONSULTS
Consult - Cardiology   Yolie Im 65 y o  female MRN: [id number]  Unit/Bed#: [facility] [address] 2 [id number] Encounter: [id number]        Reason For Consult:  Preoperative risk stratification                 Assessment:  PAD with nonhealing ulcerations of the left heel and right hallux POA   10/31/2022 angiogram, Rt ALLISON angioplasty/stent & LLE SFA shock wave angioplasty/FATOU   11/1/2022 CTA abdomen/pelvis: Flow limiting dissection flap at site of prior arteriotomy in the Rt CFA (likely due to closure device injury) superimposed on moderate-severe atherosclerosis with focal severe atherosclerotic stenosis at the abductor canal with moderate stenosis throughout the remainder of the right SFA    NYDIA   Baseline creatinine 0 7-1 1;  currently 1 35    Hypertension   O/p Rx:  Norvasc 10 mg daily, Toprol 25 mg daily, lisinopril 40 mg daily    Dyslipidemia   Lipitor 40 mg    Echo 10/30/2022:  LVEF 60%, Mild-moderate concentric hypertrophy, no RWMA, normal diastolic function  Normal RV size and function  Trace MR  Trace TR (PASP 31 mmHg)    Other  Diabetes  CVA-2015:  small-vessel, left frontal corona radiata, Tx-risk factor modulation  Anxiety, depression      Discussion / Plan:  #  patient with PAD status post angioplasty 10/31 as above with subsequent CTA evidence of flow-limiting dissection of the Rt CFA for which the patient will need vascular surgical right femoral endarterectomy, vessel repair repair, and SFA intervention  # this patient's comorbidities likely confer an intermediate risk for most surgeries noting that because she has to have open vascular surgery a higher risk is conferred because of that procedure  Favorably, however, she has no sign or symptom of coronary insufficiency or dysrhythmia  She does have some signs of volume overload though this is peripheral and for the most part noncardiogenic    # additional testing unlikely to modify the patient's risk or management and will likely delay what would seems to be a somewhat time sensitive procedure - patient understands and accepts this       #  will defer to Nephrology regarding diuretic suspecting that if renal function stabilizes/improves she may benefit from resume diuretic in the next 1-2 days  Advised prudent use of perioperative crystalloid and clinical surveillance for signs/symptoms of decompensated CHF  Suggest optimize electrolytes with potassium and magnesium goals respectively greater than 4 0 and 2 0 with hemoglobin goal not less than 8  History Of Present Illness:  Venkatesh Arnold is a 79-year-old without recent care by a cardiologist    This patient is currently hospitalized having presented to the emergency department on 10/21/2022 with nonhealing and worsening left heel with initial signs of uncontrolled hypertension present on arrival and in the setting of recent medication noncompliance due to mental health and kristina constraints  She has since admission been treated with antibiotics and additionally seen by podiatry and vascular surgery services  She has had angiography with percutaneous intervention on 10/31 as noted above  The following day, yesterday, the patient had some new intermittent right lower extremity discomfort for which is CTA was obtained showing signs of focal dissection and stenoses as discussed above with current vascular surgical plan for open femoral endarterectomy, vessel repair, and SFA intervention  With this we were asked to see the patient for perioperative risk stratification  Barring recent difficulties with the patient's foot she was capable of performing 5 metabolic equivalent activities noting ability to walk a block, carry laundry basket up a flight of steps and other moderate housework such as vacuuming  She denies any history or symptoms of angina or dysrhythmia    She has had intermittent lower extremity edema which for the most part sounds dependent in nature and probably noncardiogenic though she states since admission this has worsened noting that the patient is being followed by Nephrology for NYDIA  Echocardiogram this hospitalization shows normal EF without wall motion abnormality, normal diastolic function and only trace valvular heart disease  She has not had prior ischemic testing  Past Medical History:        Past Medical History:   Diagnosis Date   • Anxiety    • Depression    • Diabetes ([facility] )    • Diabetes mellitus ([facility] )    • Esophageal reflux     28 APR 2015 RESOLVED   • Hyperlipidemia    • Hypertension    • Low back pain     sciatica   • Pneumonia 2019   • Stroke ([place] [address] ) 12/2015    small vessel, L frontal corona radiata  Rx asa 81, Lipitor 40, risk modification   • Vitamin D deficiency       Past Surgical History:   Procedure Laterality Date   • COLONOSCOPY     • IR AORTAGRAM WITH RUN-OFF  10/31/2022        Allergy:        No Known Allergies    Medications:       Prior to Admission medications    Medication Sig Start Date End Date Taking?  Authorizing Provider   ALPRAZolam Socorrohenry Miguel) 0 25 mg tablet Take 1 tablet (0 25 mg total) by mouth daily as needed for anxiety (panic attacks) 4/15/22  Yes ALEX De La Rosa   amLODIPine (NORVASC) 10 mg tablet TAKE 1 TABLET BY MOUTH EVERY DAY 9/28/22  Yes ALEX De La Rosa   aspirin 81 mg chewable tablet Chew 81 mg 12/15/15  Yes Historical Provider, MD   atorvastatin (LIPITOR) 40 mg tablet TAKE 1 TABLET BY MOUTH EVERY DAY 2/8/22  Yes ALEX De La Rosa   clotrimazole-betamethasone (LOTRISONE) 1-0 05 % cream Apply topically 2 (two) times a day 3/16/22  Yes ALEX De La Rosa   fluocinonide (LIDEX) 0 05 % ointment Apply topically 2 (two) times a day 4/27/22  Yes Anish Roy DPM   gabapentin (NEURONTIN) 100 mg capsule TAKE 2 CAPSULES BY MOUTH TWICE A DAY 8/27/22  Yes ALEX De La Rosa   insulin aspart (NovoLOG FlexPen) 100 UNIT/ML injection pen Inject 20 Units under the skin 3 (three) times a day with meals 3/31/22  Yes Hernando Mota MD Z[place]   insulin glargine (Lantus SoloStar) 100 units/mL injection pen Inject 60 Units under the skin daily at bedtime 3/31/22  Yes Azul Belle MD   lisinopril (ZESTRIL) 40 mg tablet TAKE 1 TABLET BY MOUTH EVERY DAY 9/7/22  Yes Tristan Rinne Urffer, CRNP   metoprolol succinate (TOPROL-XL) 25 mg 24 hr tablet TAKE 1 TABLET (25 MG TOTAL) BY MOUTH DAILY   10/2/22  Yes ALEX Marie   Multiple Vitamin (multivitamin) tablet Take 1 tablet by mouth daily   Yes Historical Provider, MD   sertraline (ZOLOFT) 100 mg tablet TAKE 1 TABLET BY MOUTH EVERY DAY 8/1/22  Yes Tristan Rinne Urffer, CRNP   Trulicity 6 20 XK/8 4CZ SOPN INJECT 0 5 ML (0 75 MG TOTAL) UNDER THE SKIN ONCE A WEEK TUESDAY 5/5/22  Yes Azul Belle MD   Cholecalciferol (Vitamin D-3) 25 MCG (1000 UT) CAPS Take 2,000 Units by mouth daily  Patient not taking: Reported on 10/21/2022    Historical Provider, MD   Continuous Blood Gluc  (GuestSpan 14 Day Ashley) LITO Use 1 Units continuous  Patient not taking: Reported on 10/21/2022 8/25/21   Myra Flowers MD   Continuous Blood Gluc Sensor (FreeStyle Noé 14 Day Sensor) MISC Use daily as directed for CGM - Change every 14 days  Patient not taking: No sig reported 3/31/22   Azul Belle MD   Doxylamine Succinate, Sleep, (UNISOM PO) Take by mouth  Patient not taking: No sig reported    Historical Provider, MD   glucose blood (True Metrix Blood Glucose Test) test strip Use 1 each 4 (four) times a day  Patient not taking: No sig reported 6/14/21   Tristan Rinne Urffer, CRNP   Insulin Pen Needle (B-D UF III MINI PEN NEEDLES) 31G X 5 MM MISC USE WITH INSULIN FOUR TIMES DAILY 4/15/22   ALEX Marie   Lancets 28G MISC Use 1 each 4 (four) times a day  Patient not taking: No sig reported 6/14/21   Tristan Rinne Urffer, CRNP   pantoprazole (PROTONIX) 40 mg tablet Take 1 tablet (40 mg total) by mouth daily for 14 days 2/21/22 3/7/22  Carlos Clement MD   potassium chloride (K-DUR,KLOR-CON) 10 mEq tablet Take 2 tablets (20 mEq total) by mouth 2 (two) times a day for 2 doses 22  Jony Alberts MD       Family History:     Family History   Problem Relation Age of Onset   • Diabetes Mother    • Lung cancer Mother    • Cancer Father    • Lung cancer Father    • Hypertension Brother    • Hypertension Family         Social History:       Social History     Socioeconomic History   • Marital status: Single     Spouse name: None   • Number of children: None   • Years of education: None   • Highest education level: None   Occupational History   • None   Tobacco Use   • Smoking status: Former Smoker     Types: Cigarettes     Quit date:      Years since quittin 8   • Smokeless tobacco: Never Used   Vaping Use   • Vaping Use: Never used   Substance and Sexual Activity   • Alcohol use: Never     Comment: OCCASIONAL ALCOHOL USE IN ALL SCRIPTS   • Drug use: No   • Sexual activity: None   Other Topics Concern   • None   Social History Narrative   • None     Social Determinants of Health     Financial Resource Strain: Not on file   Food Insecurity: Not on file   Transportation Needs: Not on file   Physical Activity: Not on file   Stress: Not on file   Social Connections: Not on file   Intimate Partner Violence: Not on file   Housing Stability: Low Risk    • Unable to Pay for Housing in the Last Year: No   • Number of Places Lived in the Last Year: 1   • Unstable Housing in the Last Year: No       ROS:  Symptoms per HPI  Occasional feelings of nervousness and anxiety  The remainder of the review of systems is negative    Exam:  General:  Alert, normally conversant, comfortable appearing  Head: Normocephalic, atraumatic  Eyes:  EOMI  Pupils - equal, round, reactive to accomodation  No icterus  Normal Conjunctiva  Oropharynx: Moist without lesion  Neck:  No gross bruit, JVD, thyromegaly, or lymphadenopathy  Heart:  Regular with controlled rate    No rub nor pathologic murmur  Lungs:  Clear without rales/rhonchi/wheeze  Abdomen:  Soft and nontender with normal bowel sounds  No organomegaly or mass  Lower Limbs:  Mostly nonpitting edema in the dependent thighs and circumferentially in the bilateral lower extremities below the knees where it is at least 1-2 +  Pulses[de-identified]  RLE - DP:  1-2+                 LLE - DP:  Dressed and not evaluated  Musculoskeletal: Independent movement of limbs observed, Formal ROM and strength eval not performed  Neurologic:    Oriented to: person, place, situation  Cranial Nerves: grossly intact - vision, smell, taste, and hearing were not tested  Motor function: grossly normal, symmetric   Sensation: Was not tested      Vitals:    11/02/22 0252 11/02/22 0600 11/02/22 0609 11/02/22 0740   BP: 145/70  (!) 177/73 113/66   BP Location: Right arm      Pulse: 84  80 79   Resp: 22 21 18   Temp: 99 6 °F (37 6 °C)  98 9 °F (37 2 °C) 99 °F (37 2 °C)   TempSrc: Oral      SpO2: (!) 88%  90% 98%   Weight:  110 kg (242 lb 1 oz)     Height:               DATA:      -----------    ECG:  10/22/2022  01:03                             -----------------------------------------------------------------------------------------------------------------------------------------------  Echocardiogram  10/30/2020  Interpretation Summary       •  Left Ventricle: Left ventricular cavity size is normal  There is mild to moderate concentric hypertrophy  The left ventricular ejection fraction is 60% by visual estimation  Systolic function is normal  Wall motion is normal  Diastolic function is normal   •  Right Ventricle: Right ventricular cavity size is normal  Systolic function is normal   •  Aortic Valve: There is aortic sclerosis without clear visualization of the valve leaflets  •  Mitral Valve: There is mild annular calcification  There is trace regurgitation  •  Tricuspid Valve: There is trace regurgitation  Pulmonary artery systolic pressures are estimated at 31 mm Hg    •  There is no study for comparison          Findings    Left Ventricle Left ventricular cavity size is normal  Wall thickness is increased  There is mild to moderate concentric hypertrophy  The left ventricular ejection fraction is 60% by visual estimation  Systolic function is normal   Wall motion is normal  Diastolic function is normal    Right Ventricle Right ventricular cavity size is normal  Systolic function is normal    Left Atrium The atrium is normal in size  Right Atrium The atrium is normal in size  Atrial Septum The interatrial septum appears to be grossly normal without evidence of shunting by color-flow Doppler  Aortic Valve The aortic valve was not well visualized  There is no evidence of regurgitation  There is aortic sclerosis without clear visualization of the valve leaflets  Mitral Valve There is mild calcification with mild chordal involvement  The leaflets exhibit normal mobility  There is mild annular calcification  There is trace regurgitation  There is no evidence of stenosis  Tricuspid Valve The leaflets exhibit normal mobility  There is trace regurgitation  There is no evidence of stenosis  Pulmonary artery systolic pressures are estimated at 31 mm Hg  Pulmonic Valve The pulmonic valve was not well visualized  There is no evidence of regurgitation  There is no evidence of stenosis  Ascending Aorta The aortic root is normal in size  The ascending aorta is top-normal in size  IVC/SVC The inferior vena cava is normal in size  Respirophasic changes were normal    Pericardium There is no pericardial effusion  The pericardium is normal in appearance  -----------------------------------------------------------------------------------------------------------------------------------------------  Weights:     Wt Readings from Last 20 Encounters:   11/02/22 110 kg (242 lb 1 oz)   10/26/22 98 4 kg (216 lb 14 9 oz)   10/21/22 98 4 kg (217 lb)   04/27/22 103 kg (227 lb)   04/13/22 103 kg (227 lb 6 4 oz) 03/22/22 104 kg (228 lb 12 8 oz)   03/16/22 106 kg (232 lb 9 6 oz)   02/21/22 103 kg (227 lb 15 3 oz)   12/21/21 107 kg (235 lb 3 2 oz)   10/18/21 106 kg (233 lb 12 8 oz)   08/27/21 101 kg (222 lb)   08/25/21 98 4 kg (217 lb)   05/27/21 95 2 kg (209 lb 12 8 oz)   04/01/21 97 6 kg (215 lb 3 2 oz)   03/19/21 96 1 kg (211 lb 12 8 oz)   12/28/20 96 7 kg (213 lb 3 2 oz)   12/02/20 95 8 kg (211 lb 3 2 oz)   08/04/20 95 3 kg (210 lb)   02/19/20 81 2 kg (179 lb)   02/13/20 81 9 kg (180 lb 9 6 oz)   , Body mass index is 41 88 kg/m²           Lab Studies:               Results from last 7 days   Lab Units 11/02/22  0607 11/01/22 2327 11/01/22  0445   WBC Thousand/uL 13 66* 12 64* 13 43*   HEMOGLOBIN g/dL 7 6* 7 1* 8 0*   HEMATOCRIT % 25 4* 23 0* 26 1*   PLATELETS Thousands/uL 316 282 316   ,   Results from last 7 days   Lab Units 11/02/22  0607 11/01/22  0445 10/31/22  0536   POTASSIUM mmol/L 3 7 3 8 4 4   CHLORIDE mmol/L 101 102 103   CO2 mmol/L 31 30 28   BUN mg/dL 15 20 20   CREATININE mg/dL 1 35* 1 26 1 38*   CALCIUM mg/dL 9 0 8 9 9 4

## 2022-11-02 NOTE — ASSESSMENT & PLAN NOTE
· In setting of uncontrolled diabetes and known PAD  · Arterial duplex reviewed, "Great toe pressure of 35 mm Hg, below healing threshold for a diabetic"  · MRI of the foot noted for "a large plantar lateral heel ulcer, without underlying soft tissue abscess  · Received Zosyn x7, no further abx per ID  · Status post arteriogram with angioplasty  · Continue aspirin, Plavix and statin  · Podiatry evaluated, recommending outpatient wound care, no surgical intervention  · IR arteriogram completed on 10/31 left  lower leg arteriogram with proximal and distal SFA shockwave balloon lithotripsy and stent placement, as well as right common iliac artery stent placement  · Repeat imaging show increased velocity with flow limiting dissection of right lower limb  · Heparin gtt  · Vascular planning for bypass surgery this Monday  · Cardiology evaluated for clearance

## 2022-11-02 NOTE — PLAN OF CARE
Problem: Potential for Falls  Goal: Patient will remain free of falls  Description: INTERVENTIONS:  - Educate patient/family on patient safety including physical limitations  - Instruct patient to call for assistance with activity   - Consult OT/PT to assist with strengthening/mobility   - Keep Call bell within reach  - Keep bed low and locked with side rails adjusted as appropriate  - Keep care items and personal belongings within reach  - Initiate and maintain comfort rounds  - Make Fall Risk Sign visible to staff  - Offer Toileting every 2 Hours, in advance of need  - Initiate/Maintain bed  chair alarm  - Obtain necessary fall risk management equipment: bed chair alarm, call bell, gripper sock, walker, bedside commode  - Apply yellow socks and bracelet for high fall risk patients  - Consider moving patient to room near nurses station  Outcome: Progressing     Problem: MOBILITY - ADULT  Goal: Maintain or return to baseline ADL function  Description: INTERVENTIONS:  -  Assess patient's ability to carry out ADLs; assess patient's baseline for ADL function and identify physical deficits which impact ability to perform ADLs (bathing, care of mouth/teeth, toileting, grooming, dressing, etc )  - Assess/evaluate cause of self-care deficits   - Assess range of motion  - Assess patient's mobility; develop plan if impaired  - Assess patient's need for assistive devices and provide as appropriate  - Encourage maximum independence but intervene and supervise when necessary  - Involve family in performance of ADLs  - Assess for home care needs following discharge   - Consider OT consult to assist with ADL evaluation and planning for discharge  - Provide patient education as appropriate  Outcome: Progressing  Goal: Maintains/Returns to pre admission functional level  Description: INTERVENTIONS:  - Perform BMAT or MOVE assessment daily    - Set and communicate daily mobility goal to care team and patient/family/caregiver     - Collaborate with rehabilitation services on mobility goals if consulted  - Assist patient in repositioning every 2 hours    - Dangle patient 3 times a day  - Stand patient 3 times a day  - Out of bed to chair as tolerated  - Out of bed for meals  - Out of bed for toileting  - Record patient progress and toleration of activity level   Outcome: Progressing     Problem: PAIN - ADULT  Goal: Verbalizes/displays adequate comfort level or baseline comfort level  Description: Interventions:  - Encourage patient to monitor pain and request assistance  - Assess pain using appropriate pain scale  - Administer analgesics based on type and severity of pain and evaluate response  - Implement non-pharmacological measures as appropriate and evaluate response  - Consider cultural and social influences on pain and pain management  - Notify physician/advanced practitioner if interventions unsuccessful or patient reports new pain  Outcome: Progressing     Problem: INFECTION - ADULT  Goal: Absence or prevention of progression during hospitalization  Description: INTERVENTIONS:  - Assess and monitor for signs and symptoms of infection  - Monitor lab/diagnostic results  - Monitor all insertion sites, i e  indwelling lines, tubes, and drains  - Administer medications as ordered  - Instruct and encourage patient and family to use good hand hygiene technique  Outcome: Progressing     Problem: SAFETY ADULT  Goal: Patient will remain free of falls  Description: INTERVENTIONS:  - Educate patient/family on patient safety including physical limitations  - Instruct patient to call for assistance with activity   - Consult OT/PT to assist with strengthening/mobility   - Keep Call bell within reach  - Keep bed low and locked with side rails adjusted as appropriate  - Keep care items and personal belongings within reach  - Initiate and maintain comfort rounds  - Make Fall Risk Sign visible to staff  - Offer Toileting every 2 Hours, in advance of need  - Initiate/Maintain bed  chair alarm  - Obtain necessary fall risk management equipment: bed chair alarm, call bell, gripper sock, walker, bedside commode  - Apply yellow socks and bracelet for high fall risk patients  - Consider moving patient to room near nurses station  Outcome: Progressing  Goal: Maintain or return to baseline ADL function  Description: INTERVENTIONS:  -  Assess patient's ability to carry out ADLs; assess patient's baseline for ADL function and identify physical deficits which impact ability to perform ADLs (bathing, care of mouth/teeth, toileting, grooming, dressing, etc )  - Assess/evaluate cause of self-care deficits   - Assess range of motion  - Assess patient's mobility; develop plan if impaired  - Assess patient's need for assistive devices and provide as appropriate  - Encourage maximum independence but intervene and supervise when necessary  - Involve family in performance of ADLs  - Assess for home care needs following discharge   - Consider OT consult to assist with ADL evaluation and planning for discharge  - Provide patient education as appropriate  Outcome: Progressing  Goal: Maintains/Returns to pre admission functional level  Description: INTERVENTIONS:  - Perform BMAT or MOVE assessment daily    - Set and communicate daily mobility goal to care team and patient/family/caregiver  - Collaborate with rehabilitation services on mobility goals if consulted  - Assist patient in repositioning every 2 hours    - Dangle patient 3 times a day  - Stand patient 3 times a day  - Out of bed to chair as tolerated  - Out of bed for meals  - Out of bed for toileting  - Record patient progress and toleration of activity level   Outcome: Progressing     Problem: DISCHARGE PLANNING  Goal: Discharge to home or other facility with appropriate resources  Description: INTERVENTIONS:  - Identify barriers to discharge w/patient   - Arrange for needed discharge resources and transportation as appropriate  - Identify discharge learning needs  - Refer to Case Management Department for coordinating discharge planning if the patient needs post-hospital services based on physician/advanced practitioner order   Outcome: Progressing     Problem: Knowledge Deficit  Goal: Patient/family/caregiver demonstrates understanding of disease process, treatment plan, medications, and discharge instructions  Description: Complete learning assessment and assess knowledge base    Interventions:  - Provide teaching at level of understanding  - Provide teaching via preferred learning methods  Outcome: Progressing     Problem: METABOLIC, FLUID AND ELECTROLYTES - ADULT  Goal: Electrolytes maintained within normal limits  Description: INTERVENTIONS:  - Monitor labs and assess patient for signs and symptoms of electrolyte imbalances  - Administer electrolyte replacement as ordered  - Monitor response to electrolyte replacements, including repeat lab results as appropriate  - Instruct patient on fluid and nutrition as appropriate  Outcome: Progressing  Goal: Fluid balance maintained  Description: INTERVENTIONS:  - Monitor labs   - Monitor I/O and WT  - Instruct patient on fluid and nutrition as appropriate  - Assess for signs & symptoms of volume excess or deficit  Outcome: Progressing  Goal: Glucose maintained within target range  Description: INTERVENTIONS:  - Monitor Blood Glucose as ordered  - Assess for signs and symptoms of hyperglycemia and hypoglycemia  - Administer ordered medications to maintain glucose within target range  - Assess nutritional intake and initiate nutrition service referral as needed  Outcome: Progressing     Problem: SKIN/TISSUE INTEGRITY - ADULT  Goal: Skin Integrity remains intact(Skin Breakdown Prevention)  Description: Assess:  -Perform Nicanor assessment every shift  -Clean and moisturize skin every shift  -Inspect skin when repositioning, toileting, and assisting with ADLS  -Assess under medical devices   -Assess extremities for adequate circulation and sensation     Bed Management:  -Have minimal linens on bed & keep smooth, unwrinkled  -Change linens as needed when moist or perspiring  -Avoid sitting or lying in one position for more than 2 hours while in bed    Toileting:  -Offer bedside commode  -Assess for incontinence every 2 hours  -Use incontinent care products after each incontinent episode     Activity:  -Mobilize patient 3 times a day  -Encourage or provide ROM exercises   -Turn and reposition patient every 2 Hours  -Use appropriate equipment to lift or move patient in bed  -Instruct/ Assist with weight shifting every 15 minutes when out of bed in chair  -Consider limitation of chair time 1 hour intervals    Skin Care:  -Avoid use of baby powder, tape, friction and shearing, hot water or constrictive clothing  -Relieve pressure over bony prominences using waffle cushion when in chair  -Do not massage red bony areas    Outcome: Progressing  Goal: Incision(s), wounds(s) or drain site(s) healing without S/S of infection  Description: INTERVENTIONS  - Assess and document dressing, incision, wound bed, drain sites and surrounding tissue  - Provide patient and family education  - Perform skin care/dressing changes everyday as ordered  Outcome: Progressing  Goal: Pressure injury heals and does not worsen  Description: Interventions:  - Implement low air loss mattress or specialty surface (Criteria met)  - Apply silicone foam dressing  - Instruct/assist with weight shifting every 15 minutes when in chair   - Limit chair time to 1 hour intervals  - Use special pressure reducing interventions such as waffle cushion when in chair   - Perform passive or active ROM   - Turn and reposition patient & offload bony prominences every 2 hours   - Utilize friction reducing device or surface for transfers   - Consider consults to  interdisciplinary teams such as wound care, PT/OT  - Use incontinent care products after each incontinent episode   - Consider nutrition services referral as needed  Outcome: Progressing     Problem: MUSCULOSKELETAL - ADULT  Goal: Maintain or return mobility to safest level of function  Description: INTERVENTIONS:  - Assess patient's ability to carry out ADLs; assess patient's baseline for ADL function and identify physical deficits which impact ability to perform ADLs (bathing, care of mouth/teeth, toileting, grooming, dressing, etc )  - Assess/evaluate cause of self-care deficits   - Assess range of motion  - Assess patient's mobility  - Assess patient's need for assistive devices and provide as appropriate  - Encourage maximum independence but intervene and supervise when necessary  - Involve family in performance of ADLs  - Assess for home care needs following discharge   - Consider OT consult to assist with ADL evaluation and planning for discharge  - Provide patient education as appropriate  Outcome: Progressing  Goal: Maintain proper alignment of affected body part  Description: INTERVENTIONS:  - Support, maintain and protect limb and body alignment  - Provide patient/ family with appropriate education  Outcome: Progressing     Problem: CARDIOVASCULAR - ADULT  Goal: Maintains optimal cardiac output and hemodynamic stability  Description: INTERVENTIONS:  - Monitor I/O, vital signs and rhythm  - Monitor for S/S and trends of decreased cardiac output  - Administer and titrate ordered vasoactive medications to optimize hemodynamic stability  - Assess quality of pulses, skin color and temperature  - Assess for signs of decreased coronary artery perfusion  - Instruct patient to report change in severity of symptoms  Outcome: Progressing     Problem: RESPIRATORY - ADULT  Goal: Achieves optimal ventilation and oxygenation  Description: INTERVENTIONS:  - Assess for changes in respiratory status  - Assess for changes in mentation and behavior  - Position to facilitate oxygenation and minimize respiratory effort  - Oxygen administered by appropriate delivery if ordered  - Initiate smoking cessation education as indicated  - Encourage broncho-pulmonary hygiene including cough, deep breathe, Incentive Spirometry  - Assess the need for suctioning and aspirate as needed  - Assess and instruct to report SOB or any respiratory difficulty  - Respiratory Therapy support as indicated  Outcome: Progressing     Problem: Prexisting or High Potential for Compromised Skin Integrity  Goal: Skin integrity is maintained or improved  Description: INTERVENTIONS:  - Identify patients at risk for skin breakdown  - Assess and monitor skin integrity  - Assess and monitor nutrition and hydration status  - Monitor labs   - Assess for incontinence   - Turn and reposition patient  - Assist with mobility/ambulation  - Relieve pressure over bony prominences  - Avoid friction and shearing  - Provide appropriate hygiene as needed including keeping skin clean and dry  - Evaluate need for skin moisturizer/barrier cream  - Collaborate with interdisciplinary team   - Patient/family teaching  - Consider wound care consult   Outcome: Progressing

## 2022-11-03 ENCOUNTER — TELEPHONE (OUTPATIENT)
Dept: VASCULAR SURGERY | Facility: CLINIC | Age: 62
End: 2022-11-03

## 2022-11-03 ENCOUNTER — ANESTHESIA EVENT (INPATIENT)
Dept: PERIOP | Facility: HOSPITAL | Age: 62
End: 2022-11-03

## 2022-11-03 LAB
ANION GAP SERPL CALCULATED.3IONS-SCNC: 7 MMOL/L (ref 4–13)
APTT PPP: 79 SECONDS (ref 23–37)
BASOPHILS # BLD AUTO: 0.06 THOUSANDS/ÂΜL (ref 0–0.1)
BASOPHILS NFR BLD AUTO: 1 % (ref 0–1)
BUN SERPL-MCNC: 15 MG/DL (ref 5–25)
CALCIUM SERPL-MCNC: 8.4 MG/DL (ref 8.3–10.1)
CHLORIDE SERPL-SCNC: 104 MMOL/L (ref 96–108)
CO2 SERPL-SCNC: 31 MMOL/L (ref 21–32)
CREAT SERPL-MCNC: 1.3 MG/DL (ref 0.6–1.3)
EOSINOPHIL # BLD AUTO: 0.72 THOUSAND/ÂΜL (ref 0–0.61)
EOSINOPHIL NFR BLD AUTO: 7 % (ref 0–6)
ERYTHROCYTE [DISTWIDTH] IN BLOOD BY AUTOMATED COUNT: 15.4 % (ref 11.6–15.1)
GFR SERPL CREATININE-BSD FRML MDRD: 44 ML/MIN/1.73SQ M
GLUCOSE SERPL-MCNC: 149 MG/DL (ref 65–140)
GLUCOSE SERPL-MCNC: 74 MG/DL (ref 65–140)
GLUCOSE SERPL-MCNC: 74 MG/DL (ref 65–140)
GLUCOSE SERPL-MCNC: 82 MG/DL (ref 65–140)
GLUCOSE SERPL-MCNC: 91 MG/DL (ref 65–140)
HCT VFR BLD AUTO: 23.1 % (ref 34.8–46.1)
HGB BLD-MCNC: 6.8 G/DL (ref 11.5–15.4)
IMM GRANULOCYTES # BLD AUTO: 0.11 THOUSAND/UL (ref 0–0.2)
IMM GRANULOCYTES NFR BLD AUTO: 1 % (ref 0–2)
LYMPHOCYTES # BLD AUTO: 1.55 THOUSANDS/ÂΜL (ref 0.6–4.47)
LYMPHOCYTES NFR BLD AUTO: 14 % (ref 14–44)
MCH RBC QN AUTO: 26.8 PG (ref 26.8–34.3)
MCHC RBC AUTO-ENTMCNC: 29.4 G/DL (ref 31.4–37.4)
MCV RBC AUTO: 91 FL (ref 82–98)
MONOCYTES # BLD AUTO: 0.86 THOUSAND/ÂΜL (ref 0.17–1.22)
MONOCYTES NFR BLD AUTO: 8 % (ref 4–12)
NEUTROPHILS # BLD AUTO: 7.55 THOUSANDS/ÂΜL (ref 1.85–7.62)
NEUTS SEG NFR BLD AUTO: 69 % (ref 43–75)
NRBC BLD AUTO-RTO: 0 /100 WBCS
PLATELET # BLD AUTO: 296 THOUSANDS/UL (ref 149–390)
PMV BLD AUTO: 9.1 FL (ref 8.9–12.7)
POTASSIUM SERPL-SCNC: 3.6 MMOL/L (ref 3.5–5.3)
RBC # BLD AUTO: 2.54 MILLION/UL (ref 3.81–5.12)
SODIUM SERPL-SCNC: 142 MMOL/L (ref 135–147)
WBC # BLD AUTO: 10.85 THOUSAND/UL (ref 4.31–10.16)

## 2022-11-03 PROCEDURE — 97530 THERAPEUTIC ACTIVITIES: CPT

## 2022-11-03 PROCEDURE — 82948 REAGENT STRIP/BLOOD GLUCOSE: CPT

## 2022-11-03 PROCEDURE — 85025 COMPLETE CBC W/AUTO DIFF WBC: CPT | Performed by: STUDENT IN AN ORGANIZED HEALTH CARE EDUCATION/TRAINING PROGRAM

## 2022-11-03 PROCEDURE — 30233N1 TRANSFUSION OF NONAUTOLOGOUS RED BLOOD CELLS INTO PERIPHERAL VEIN, PERCUTANEOUS APPROACH: ICD-10-PCS | Performed by: STUDENT IN AN ORGANIZED HEALTH CARE EDUCATION/TRAINING PROGRAM

## 2022-11-03 PROCEDURE — 93971 EXTREMITY STUDY: CPT | Performed by: SURGERY

## 2022-11-03 PROCEDURE — 97110 THERAPEUTIC EXERCISES: CPT

## 2022-11-03 PROCEDURE — 85730 THROMBOPLASTIN TIME PARTIAL: CPT | Performed by: STUDENT IN AN ORGANIZED HEALTH CARE EDUCATION/TRAINING PROGRAM

## 2022-11-03 PROCEDURE — 99232 SBSQ HOSP IP/OBS MODERATE 35: CPT | Performed by: INTERNAL MEDICINE

## 2022-11-03 PROCEDURE — 99232 SBSQ HOSP IP/OBS MODERATE 35: CPT | Performed by: NURSE PRACTITIONER

## 2022-11-03 PROCEDURE — 80048 BASIC METABOLIC PNL TOTAL CA: CPT | Performed by: STUDENT IN AN ORGANIZED HEALTH CARE EDUCATION/TRAINING PROGRAM

## 2022-11-03 PROCEDURE — 99232 SBSQ HOSP IP/OBS MODERATE 35: CPT | Performed by: STUDENT IN AN ORGANIZED HEALTH CARE EDUCATION/TRAINING PROGRAM

## 2022-11-03 PROCEDURE — 97535 SELF CARE MNGMENT TRAINING: CPT

## 2022-11-03 PROCEDURE — P9016 RBC LEUKOCYTES REDUCED: HCPCS

## 2022-11-03 RX ORDER — POTASSIUM CHLORIDE 20 MEQ/1
40 TABLET, EXTENDED RELEASE ORAL ONCE
Status: COMPLETED | OUTPATIENT
Start: 2022-11-03 | End: 2022-11-03

## 2022-11-03 RX ORDER — FUROSEMIDE 10 MG/ML
40 INJECTION INTRAMUSCULAR; INTRAVENOUS ONCE
Status: COMPLETED | OUTPATIENT
Start: 2022-11-03 | End: 2022-11-03

## 2022-11-03 RX ORDER — FUROSEMIDE 40 MG/1
40 TABLET ORAL DAILY
Status: COMPLETED | OUTPATIENT
Start: 2022-11-04 | End: 2022-11-06

## 2022-11-03 RX ORDER — INSULIN LISPRO 100 [IU]/ML
3 INJECTION, SOLUTION INTRAVENOUS; SUBCUTANEOUS
Status: DISCONTINUED | OUTPATIENT
Start: 2022-11-03 | End: 2022-11-07

## 2022-11-03 RX ADMIN — HEPARIN SODIUM 17 UNITS/KG/HR: 10000 INJECTION, SOLUTION INTRAVENOUS at 22:05

## 2022-11-03 RX ADMIN — NYSTATIN: 100000 POWDER TOPICAL at 17:36

## 2022-11-03 RX ADMIN — NYSTATIN: 100000 POWDER TOPICAL at 08:22

## 2022-11-03 RX ADMIN — AMLODIPINE BESYLATE 10 MG: 10 TABLET ORAL at 08:16

## 2022-11-03 RX ADMIN — GABAPENTIN 200 MG: 100 CAPSULE ORAL at 08:16

## 2022-11-03 RX ADMIN — GABAPENTIN 200 MG: 100 CAPSULE ORAL at 17:34

## 2022-11-03 RX ADMIN — OXYCODONE HYDROCHLORIDE 10 MG: 10 TABLET ORAL at 13:22

## 2022-11-03 RX ADMIN — ATORVASTATIN CALCIUM 40 MG: 40 TABLET, FILM COATED ORAL at 17:33

## 2022-11-03 RX ADMIN — CLOPIDOGREL BISULFATE 75 MG: 75 TABLET ORAL at 08:16

## 2022-11-03 RX ADMIN — OXYCODONE 5 MG: 5 TABLET ORAL at 10:52

## 2022-11-03 RX ADMIN — HEPARIN SODIUM 17 UNITS/KG/HR: 10000 INJECTION, SOLUTION INTRAVENOUS at 04:51

## 2022-11-03 RX ADMIN — ASPIRIN 81 MG CHEWABLE TABLET 81 MG: 81 TABLET CHEWABLE at 08:16

## 2022-11-03 RX ADMIN — POTASSIUM CHLORIDE 40 MEQ: 1500 TABLET, EXTENDED RELEASE ORAL at 10:51

## 2022-11-03 RX ADMIN — INSULIN LISPRO 3 UNITS: 100 INJECTION, SOLUTION INTRAVENOUS; SUBCUTANEOUS at 17:34

## 2022-11-03 RX ADMIN — CARVEDILOL 25 MG: 25 TABLET, FILM COATED ORAL at 17:33

## 2022-11-03 RX ADMIN — INSULIN LISPRO 5 UNITS: 100 INJECTION, SOLUTION INTRAVENOUS; SUBCUTANEOUS at 08:20

## 2022-11-03 RX ADMIN — FUROSEMIDE 40 MG: 10 INJECTION, SOLUTION INTRAVENOUS at 12:26

## 2022-11-03 RX ADMIN — OXYCODONE HYDROCHLORIDE 10 MG: 10 TABLET ORAL at 05:33

## 2022-11-03 RX ADMIN — SERTRALINE HYDROCHLORIDE 100 MG: 100 TABLET ORAL at 08:16

## 2022-11-03 RX ADMIN — INSULIN LISPRO 5 UNITS: 100 INJECTION, SOLUTION INTRAVENOUS; SUBCUTANEOUS at 13:00

## 2022-11-03 RX ADMIN — CARVEDILOL 25 MG: 25 TABLET, FILM COATED ORAL at 08:16

## 2022-11-03 RX ADMIN — INSULIN GLARGINE 15 UNITS: 100 INJECTION, SOLUTION SUBCUTANEOUS at 21:55

## 2022-11-03 RX ADMIN — OXYCODONE HYDROCHLORIDE 10 MG: 10 TABLET ORAL at 20:18

## 2022-11-03 NOTE — PLAN OF CARE
Problem: Potential for Falls  Goal: Patient will remain free of falls  Description: INTERVENTIONS:  - Educate patient/family on patient safety including physical limitations  - Instruct patient to call for assistance with activity   - Consult OT/PT to assist with strengthening/mobility   - Keep Call bell within reach  - Keep bed low and locked with side rails adjusted as appropriate  - Keep care items and personal belongings within reach  - Initiate and maintain comfort rounds  - Make Fall Risk Sign visible to staff  - Offer Toileting every 2 Hours, in advance of need  - Initiate/Maintain bed  chair alarm  - Obtain necessary fall risk management equipment: bed chair alarm, call bell, gripper sock, walker, bedside commode  - Apply yellow socks and bracelet for high fall risk patients  - Consider moving patient to room near nurses station  Outcome: Progressing     Problem: MOBILITY - ADULT  Goal: Maintain or return to baseline ADL function  Description: INTERVENTIONS:  -  Assess patient's ability to carry out ADLs; assess patient's baseline for ADL function and identify physical deficits which impact ability to perform ADLs (bathing, care of mouth/teeth, toileting, grooming, dressing, etc )  - Assess/evaluate cause of self-care deficits   - Assess range of motion  - Assess patient's mobility; develop plan if impaired  - Assess patient's need for assistive devices and provide as appropriate  - Encourage maximum independence but intervene and supervise when necessary  - Involve family in performance of ADLs  - Assess for home care needs following discharge   - Consider OT consult to assist with ADL evaluation and planning for discharge  - Provide patient education as appropriate  Outcome: Progressing  Goal: Maintains/Returns to pre admission functional level  Description: INTERVENTIONS:  - Perform BMAT or MOVE assessment daily    - Set and communicate daily mobility goal to care team and patient/family/caregiver     - Collaborate with rehabilitation services on mobility goals if consulted  - Assist patient in repositioning every 2 hours    - Dangle patient 3 times a day  - Stand patient 3 times a day  - Out of bed to chair as tolerated  - Out of bed for meals  - Out of bed for toileting  - Record patient progress and toleration of activity level   Outcome: Progressing     Problem: PAIN - ADULT  Goal: Verbalizes/displays adequate comfort level or baseline comfort level  Description: Interventions:  - Encourage patient to monitor pain and request assistance  - Assess pain using appropriate pain scale  - Administer analgesics based on type and severity of pain and evaluate response  - Implement non-pharmacological measures as appropriate and evaluate response  - Consider cultural and social influences on pain and pain management  - Notify physician/advanced practitioner if interventions unsuccessful or patient reports new pain  Outcome: Progressing

## 2022-11-03 NOTE — TELEPHONE ENCOUNTER
From: Alba Santos <[email or web address]   Sent: Thursday, November 3, 2022 9:42 AM  To: Claribel Maria <Anna Marie  [email or web address]; Delaney Carrasco <[email or web address]; Loretta Devi <[email or web address]  [email or web address]  Cc: Vascular Surgery Schedulers [email or web address]  Subject: RE: In house case to be scheduled    I think it would be fine to have Domer help with start of case and leave if use 7:30 start time      From: Claribel Maria <Anna Marie  [email or web address]   Sent: Thursday, November 3, 2022 8:45 AM  To: Delaney Carrasco <Delaney [email or web address]; Loretta Devi <[email or web address]; KATIE [employer] <[email or web address]  Cc: Vascular Surgery Schedulers [email or web address]  Subject: RE: In house case to be scheduled  Importance: [facility] just called asking if we can put it on Monday with Dr Elia Burden per Tori Villalpando  I did let her know we can do that however I was not sure if Dr Elia Burden needs a henry  What if we used Dr Porsha Kramer as henry if needed? He doesn’t have patient’s booked for him on Monday until 11am      From: Claribel Maria <[email or web address]   Sent: Thursday, November 3, 2022 8:28 AM  To: Vascular Surgery Schedulers [email or web address]  Subject: RE: In house case to be scheduled    There is also block starting at 7:30am Monday if we took someone out of patient hours they could start the case first thing in the morning instead of after Dr Hannah Craig cases which would be 11:45am      From: Claribel Maria <Anna [email or web address]  [email or web address]   Sent: Thursday, November 3, 2022 8:00 AM  To: Vascular Surgery Schedulers [email or web address]  Subject: In house case to be scheduled  Importance: High    Per sign out from Tawana De La Garza this morning in house patient Mauro Banegas [birth year] needs a R CFE with possible SFA angioplasty, possible bypass  She is at [address]den has time on Monday in our block however he has no henry available without taking someone out of patient hours  Does anyone see anything else that would work?

## 2022-11-03 NOTE — PLAN OF CARE
Problem: OCCUPATIONAL THERAPY ADULT  Goal: Performs self-care activities at highest level of function for planned discharge setting  See evaluation for individualized goals  Description: Treatment Interventions: ADL retraining, Functional transfer training, UE strengthening/ROM, Endurance training, Cognitive reorientation, Patient/family training, Equipment evaluation/education, Compensatory technique education, Energy conservation, Activityengagement          See flowsheet documentation for full assessment, interventions and recommendations  Outcome: Progressing  Note: Limitation: Decreased ADL status, Decreased UE strength, Decreased Safe judgement during ADL, Decreased endurance, Decreased high-level ADLs  Prognosis: Good  Assessment: Pt seen for skilled OT session focused on ADLs, bed mobility, functional transfers and UE exercises  Pt completed b/l UE exercises seen above to increase strength and endurance for ADLs, functional transfers and mobility w/ cues for proper techniques and pt requiring rest breaks  Per podiatry, Pt is now WBAT in heel offloading shoe left foot, weight-bearing as tolerated right foot  Pt w/ supervision supine>sit bed mobility w/ increased time to complete and cues for hand placement and positioning  Pt w/ MAX assist to don surgical shoe on right and heel offloading shoe on left 2* impaired functional reach  Pt w/ MOD assist sit>stand from bed w/ increased time to complete  Pt w/ MIN assist x1 functional mobility w/ RW to [facility] x2  Pt w/ MIN assist sit<>Stand to [facility] and max assist toileting hygiene w/ assist for management of brief due to impaired balance  Pt w/ increased fatigue noted during tasks and required rest breaks and cues for pursed lip breathing  Pt w/ SPO2 on 1L 90-96%  Pt preferred to sit EOB at end of session w/ all needs met   Pt w/ increased pain in b/l LEs, decreased strength and endurance, impaired balance, impaired activity tolerance, fall risk, decreased safety awareness, impaired functional reach all causing a decline in ADLs, functional transfers and mobility  Recommend STR when medically stable  Will continue to follow to address OT POC  The patient's raw score on the AM-PAC Daily Activity inpatient short form is 16, standardized score is 35 96, less than 39 4  Patients at this level are likely to benefit from discharge to post-acute rehabilitation services  Please refer to the recommendation of the Occupational Therapist for safe discharge planning       OT Discharge Recommendation: Post acute rehabilitation services

## 2022-11-03 NOTE — ASSESSMENT & PLAN NOTE
· Vascular surgery input appreciated  · Continue with Plavix, aspirin, statin therapy  · Status post arteriogram on 11/01 with angioplasty, with stent placement on left lower leg and right common iliac artery  · Plan for further surgery this monday

## 2022-11-03 NOTE — ASSESSMENT & PLAN NOTE
Hemoglobin fluctuating with baseline around 10  H/h today of 6 8, suspect component of dilution and recent IR angiogram  Will transfuse 1 unit pRBC, consent obtained  FOBT neg, no current source of bleeding, groin without hematoma

## 2022-11-03 NOTE — PROGRESS NOTES
Vanessa 49  [facility] 1960, 65 y o  female MRN: [id number]  Unit/Bed#: [address] [id number] Encounter: [id number]  Primary Care Provider: ALEX Winkler   Date and time admitted to hospital: 10/21/2022  7:58 PM    * Non healing left heel wound  Assessment & Plan  · In setting of uncontrolled diabetes and known PAD  · Arterial duplex reviewed, "Great toe pressure of 35 mm Hg, below healing threshold for a diabetic"  · MRI of the foot noted for "a large plantar lateral heel ulcer, without underlying soft tissue abscess  · Received Zosyn x7, no further abx per ID  · Status post arteriogram with angioplasty  · Continue aspirin, Plavix and statin  · Podiatry evaluated, recommending outpatient wound care, no surgical intervention  · IR arteriogram completed on 10/31 left  lower leg arteriogram with proximal and distal SFA shockwave balloon lithotripsy and stent placement, as well as right common iliac artery stent placement  · Repeat imaging show increased velocity with flow limiting dissection of right lower limb  · Heparin gtt  · Vascular planning for bypass surgery this Monday  · Cardiology evaluated for clearance    PAD (peripheral artery disease) ([facility] )  Assessment & Plan  · Vascular surgery input appreciated  · Continue with Plavix, aspirin, statin therapy  · Status post arteriogram on 11/01 with angioplasty, with stent placement on left lower leg and right common iliac artery  · Plan for further surgery this monday        Acute on chronic anemia  Assessment & Plan  Hemoglobin fluctuating with baseline around 10  H/h today of 6 8, suspect component of dilution and recent IR angiogram  Will transfuse 1 unit pRBC, consent obtained  FOBT neg, no current source of bleeding, groin without hematoma    Type 2 diabetes mellitus with hyperglycemia, with long-term current use of insulin ([facility] )  Assessment & Plan  Lab Results   Component Value Date    HGBA1C 9 8 (H) 10/25/2022     Recent Labs     22  1621 22  2034 22  0757 22  1108   POCGLU 136 163* 74 82     · Uncontrolled with A1C at 9 8  · Blood glucose controlled on lantus 30 units daily  · Change to lantus 15 units bedtime, add 3 units TID with meals  · Continue insulin sliding scale    Benign essential hypertension  Assessment & Plan  Controlled with resumption of home medications, continue current regimen    Depression, recurrent (HCC)  Assessment & Plan  Continue sertraline, Xanax PRN    VTE Pharmacologic Prophylaxis:   Pharmacologic: Heparin  Mechanical VTE Prophylaxis in Place: Yes    Patient Centered Rounds: I have performed bedside rounds with nursing staff today  Discussions with Specialists or Other Care Team Provider: Vascular, Card    Education and Discussions with Family / Patient: patient, daughter on phone    Time Spent for Care: 30 minutes  More than 50% of total time spent on counseling and coordination of care as described above  Current Length of Stay: 12 day(s)    Current Patient Status: Inpatient   Certification Statement: The patient will continue to require additional inpatient hospital stay due to pending surgery monday    Discharge Plan: pending    Code Status: Level 1 - Full Code      Subjective:   No events overnight, no complaints  Objective:     Vitals:   Temp (24hrs), Av 3 °F (37 4 °C), Min:99 1 °F (37 3 °C), Max:99 7 °F (37 6 °C)    Temp:  [99 1 °F (37 3 °C)-99 7 °F (37 6 °C)] 99 2 °F (37 3 °C)  HR:  [76-91] 82  Resp:  [16-20] 18  BP: (122-167)/() 139/63  SpO2:  [89 %-95 %] 89 %  Body mass index is 41 99 kg/m²  Input and Output Summary (last 24 hours): Intake/Output Summary (Last 24 hours) at 11/3/2022 1433  Last data filed at 11/3/2022 1358  Gross per 24 hour   Intake 350 ml   Output 500 ml   Net -150 ml       Physical Exam:     Physical Exam  Vitals reviewed  Constitutional:       General: She is not in acute distress       Appearance: Normal appearance  HENT:      Head: Atraumatic  Cardiovascular:      Rate and Rhythm: Regular rhythm  Heart sounds: Normal heart sounds  Pulmonary:      Effort: Pulmonary effort is normal  No respiratory distress  Abdominal:      General: Bowel sounds are normal  There is no distension  Palpations: Abdomen is soft  Musculoskeletal:         General: No swelling  Right lower leg: Edema present  Left lower leg: Edema present  Skin:     General: Skin is warm  Neurological:      Mental Status: She is alert and oriented to person, place, and time  Motor: No weakness  Psychiatric:         Mood and Affect: Mood normal          Additional Data:     Labs:    Results from last 7 days   Lab Units 11/03/22  0445   WBC Thousand/uL 10 85*   HEMOGLOBIN g/dL 6 8*   HEMATOCRIT % 23 1*   PLATELETS Thousands/uL 296   NEUTROS PCT % 69   LYMPHS PCT % 14   MONOS PCT % 8   EOS PCT % 7*     Results from last 7 days   Lab Units 11/03/22  0445   SODIUM mmol/L 142   POTASSIUM mmol/L 3 6   CHLORIDE mmol/L 104   CO2 mmol/L 31   BUN mg/dL 15   CREATININE mg/dL 1 30   ANION GAP mmol/L 7   CALCIUM mg/dL 8 4   GLUCOSE RANDOM mg/dL 74     Results from last 7 days   Lab Units 11/01/22  2327   INR  1 14     Results from last 7 days   Lab Units 11/03/22  1108 11/03/22  0757 11/02/22  2034 11/02/22  1621 11/02/22  1130 11/02/22  0736 11/01/22  2058 11/01/22  1601 11/01/22  1118 11/01/22  0724 11/01/22  0618 10/31/22  2106   POC GLUCOSE mg/dl 82 74 163* 136 146* 78 170* 174* 152* 192* 223* 208*                   * I Have Reviewed All Lab Data Listed Above  * Additional Pertinent Lab Tests Reviewed: All Labs For Current Hospital Admission Reviewed    Imaging:    XR chest portable    Result Date: 10/27/2022  Impression: Mild pulmonary vascular congestion  Workstation performed: [id number]     XR chest 1 view portable    Result Date: 10/22/2022  Impression: No acute cardiopulmonary disease   Workstation performed: [id number]     XR foot 3+ views RIGHT    Result Date: 10/22/2022  Impression: No acute osseous abnormality  Workstation performed: [id number]     CT lower extremity w contrast left    Result Date: 10/22/2022  Impression: No acute bony process is seen  Moderate superficial soft tissue edema in the foot, ankle, and leg, superimposed cellulitis considered in the appropriate clinical setting  Correlation with patient's symptoms and laboratory values recommended  No discrete drainable collection identified  Other findings as above   Workstation performed: [id number]       Recent Cultures (last 7 days):           Last 24 Hours Medication List:   Current Facility-Administered Medications   Medication Dose Route Frequency Provider Last Rate   • acetaminophen  650 mg Oral Q6H PRN Sugey Wiseman PA-C     • albuterol  2 puff Inhalation Q4H PRN Amy Parry MD     • amLODIPine  10 mg Oral Daily Lauren Chamber, CRNP     • aspirin  81 mg Oral Daily Sugey Wiseman PA-C     • atorvastatin  40 mg Oral Daily With Mayra Jo MD     • carvedilol  25 mg Oral BID With Meals Lauren Spangler CRNP     • clopidogrel  75 mg Oral Daily Jeremi Dia PA-C     • [START ON 11/4/2022] furosemide  40 mg Oral Daily Martha Pascal MD     • gabapentin  200 mg Oral BID Sugey Wiseman PA-C     • heparin (porcine)  3-30 Units/kg/hr (Order-Specific) Intravenous Titrated Benitez Dallas DO 17 Units/kg/hr (11/03/22 0701)   • heparin (porcine)  4,400 Units Intravenous Q1H PRN Benitez Dallas DO     • heparin (porcine)  8,800 Units Intravenous Q1H PRN Benitez Dallas DO     • hydrALAZINE  10 mg Intravenous Q6H PRN Sugey Wiseman PA-C     • insulin glargine  15 Units Subcutaneous HS Martha Pascal MD     • insulin lispro  1-6 Units Subcutaneous 4x Daily (AC & HS) Sugey Wiseman PA-C     • insulin lispro  3 Units Subcutaneous TID With Meals Martha Pascal MD     • LORazepam  0 5 mg Oral Q8H PRN Anne Marie Blackwood MD     • metoclopramide  5 mg Intravenous Q6H PRN Esha aHywood DO     • morphine injection  4 mg Intravenous Q4H PRN Esha Haywood DO     • nystatin   Topical BID Maddy Krishna MD     • ondansetron  4 mg Intravenous Q6H PRN Sugey Wiseman PA-C     • oxyCODONE  5 mg Oral Q4H PRN Amy Parry MD      Or   • oxyCODONE  10 mg Oral Q4H PRN Anne Marie Blackwood MD     • sertraline  100 mg Oral Daily Sugey Baird PA-C          Today, Patient Was Seen By: Maddy Krishna    ** Please Note: Dictation voice to text software may have been used in the creation of this document   **

## 2022-11-03 NOTE — ASSESSMENT & PLAN NOTE
65year old female former smoker w/HTN, HLD, uncontrolled type II DM (A1c 9 8), hx CVA, presenting with nonhealing extensive L heel ulceration and R hallux and 5th digit ulceration  Vascular surgery consulted for input  S/p abdominal aortogram, LLE run off w/R ALLISON PTA/stent and LLE SFA shockwave angioplasty/FATOU 10/31/22 (IR-[facility])    -Vein mapping 11/2/22 RIGHT AND LEFT LOWER LIMB:  The great saphenous vein is patent  from the groin to the ankle  The vein measures >2mm in caliber from the groin to the distal calf  -Post intervention SHARIF 11/1/22: Rt: 0 51 (0 80) Lt: 0 74/85/37  -CTA abd/pelvis w/ run off 11/1/22: Flow-limiting dissection flap at the prior arteriotomy site in the R CFA superimposed upon moderate to severe atherosclerotic disease  Focal severe atherosclerotic narrowing at the adductor canal with additional moderate atherosclerotic narrowing throughout the remainder of the R SFA    sCr/eGFR: 1 3/44  Hgb: 6 8 (7 6 11/2)    Recommendations:  -Bilateral tissue loss in the setting of uncontrolled type II DM and NYDIA on admission, now s/p angiogram w/intervention  Groin access is soft without hematoma or ecchymosis  -S/p angiogram 10/31/22 patient noted to be in acute respiratoy distress and hypoxic, requiring 10L O2 nonrebreather to keep O2 sat >90  Felt to be secondary to fluid overload  Patient diuresed and continues to improve clinically and weaning O2 as tolerated  -D/w Dr Chloe Zavala, L heel ulceration stable without OM  -Now s/p LLE angiogram w/ R ALLISON PTA/stent and L SFA shockwave angioplasty and FATOU  Two vessel run off to the LLE via AT/peroneal  PT chronically occluded   -Post intervention SHARIF obtained which did show improvement in LLE SHARIF however decrease in R SHARIF   Duplex obtained which did confirm elevated velocities of the R CFA- CTA obtained  -CTA reviewed which demonstrates flow limiting dissection of the R CFA likely secondary to closure device injury   -Patient will require surgical intervention to include R femoral endarterectomy and repair, with SFA intervention  Tentatively scheduled for OR 11/7/22  -Cardiology consulted, appreciated recs  Intermediate to high risk   Does not require further cardiac testing  -Vein mapping complete w/ adequate vein  -Medical management with ASA, statin, Plavix  -Continue local wound care per podiatry  -Medical management and glucose control per SLIM  -Will d/w Dr Glenna Thompson

## 2022-11-03 NOTE — OCCUPATIONAL THERAPY NOTE
Occupational Therapy Progress Note     Patient Name: Maribeth ANDERSEN Date: 11/3/2022  Problem List  Principal Problem:    Non healing left heel wound  Active Problems:    Depression, recurrent ([facility] )    Benign essential hypertension    Type 2 diabetes mellitus with hyperglycemia, with long-term current use of insulin ([facility])    Wound of lower extremity    N&V (nausea and vomiting)    Sepsis ([facility])    Abnormal EKG    Hypertensive urgency    UTI (urinary tract infection)    PAD (peripheral artery disease) ([facility])    NYDIA (acute kidney injury) ([facility] [place] [facility] )    Generalized edema due to fluid overload    Acute on chronic anemia    Renal insufficiency    Shortness of breath          11/03/22 1423   OT Last Visit   OT Visit Date 11/03/22   Note Type   Note Type Treatment   Pain Assessment   Pain Assessment Tool 0-10   Pain Score 6   Pain Location/Orientation Orientation: Right;Location: [facility]   Hospital Pain Intervention(s) Ambulation/increased activity;Repositioned; Emotional support   Restrictions/Precautions   Weight Bearing Precautions Per Order Yes   RLE Weight Bearing Per Order WBAT  (surgical shoe)   LLE Weight Bearing Per Order WBAT  (in heel offloading shoe)   Braces or Orthoses Other (Comment)  (surgical shoe Right, globoped left)   Other Precautions WBS; Fall Risk;Pain   ADL   Where Assessed Chair   Grooming Assistance 5  Supervision/Setup   Grooming Deficit Setup; Wash/dry hands; Wash/dry face   LB Bathing Assistance 3  Moderate Assistance   LB Bathing Deficit Setup;Steadying;Supervision/safety;Verbal cueing; Increased time to complete; Buttocks; Perineal area   LB Bathing Comments impaired balance and functional reach   LB Dressing Assistance 2  Maximal Assistance   LB Dressing Deficit Setup;Steadying; Requires assistive device for steadying;Verbal cueing; Don/doff R shoe;Don/doff L shoe   LB Dressing Comments impaired functional reach, assist to don surgical shoe and heel offloading shoe   Toileting Assistance  2  Maximal Assistance   Toileting Deficit Setup;Verbal cueing;Supervison/safety; Increased time to complete;Clothing management down;Clothing management up;Perineal hygiene; Bedside commode   Toileting Comments impaired balance   Bed Mobility   Supine to Sit 5  Supervision   Additional items Increased time required;Verbal cues; Assist x 1   Sit to Supine 4  Minimal assistance   Additional items Assist x 1; Increased time required;Verbal cues; Bedrails;HOB elevated   Additional Comments increased time to complete, seated EOB end of session w/ all needs met   Transfers   Sit to Stand 3  Moderate assistance   Additional items Assist x 1; Increased time required;Verbal cues; Bedrails;Armrests   Stand to Sit 4  Minimal assistance   Additional items Assist x 1; Increased time required;Verbal cues;Armrests   Toilet transfer 4  Minimal assistance   Additional items Assist x 1; Increased time required;Verbal cues; Commode   Additional Comments cues for hand placement and positioning, increased time to complete   Functional Mobility   Functional Mobility 4  Minimal assistance   Additional Comments assist x1 w/ increased time and assist line management   Additional items Rolling walker   Therapeutic Excerise-Strength   UE Strength Yes   Right Upper Extremity- Strength   R Shoulder Flexion; Extension;Horizontal ABduction   R Elbow Elbow flexion;Elbow extension  (forearm pronation/supination, punchouts)   R Weight/Reps/Sets 2 sets x 10 reps   RUE Strength Comment tolerated well   Left Upper Extremity-Strength   L Shoulder Flexion; Extension;Horizontal ABduction  (forearm pronation/supination, punchouts)   L Elbow Elbow flexion;Elbow extension   L Weights/Reps/Sets 2 sets x 10 reps   LUE Strength Comment tolerated well   Cognition   Overall Cognitive Status WFL   Arousal/Participation Cooperative; Alert   Attention Within functional limits   Orientation Level Oriented X4   Memory Within functional limits   Following Commands Follows one step commands without difficulty   Comments engages in appropriate conversations   Additional Activities   Additional Activities   (education on positioning)   Additional Activities Comments pt receptive   Activity Tolerance   Activity Tolerance Patient limited by fatigue;Patient limited by pain;Treatment limited secondary to medical complications (Comment)   Medical Staff Made Aware appropriate to see per RN, SB   Assessment   Assessment Pt seen for skilled OT session focused on ADLs, bed mobility, functional transfers and UE exercises  Pt completed b/l UE exercises seen above to increase strength and endurance for ADLs, functional transfers and mobility w/ cues for proper techniques and pt requiring rest breaks  Per podiatry, Pt is now WBAT in heel offloading shoe left foot, weight-bearing as tolerated right foot  Pt w/ supervision supine>sit bed mobility w/ increased time to complete and cues for hand placement and positioning  Pt w/ MAX assist to don surgical shoe on right and heel offloading shoe on left 2* impaired functional reach  Pt w/ MOD assist sit>stand from bed w/ increased time to complete  Pt w/ MIN assist x1 functional mobility w/ RW to [facility] x2  Pt w/ MIN assist sit<>Stand to [facility] and max assist toileting hygiene w/ assist for management of brief due to impaired balance  Pt w/ increased fatigue noted during tasks and required rest breaks and cues for pursed lip breathing  Pt w/ SPO2 on 1L 90-96%  Pt preferred to sit EOB at end of session w/ all needs met  Pt w/ increased pain in b/l LEs, decreased strength and endurance, impaired balance, impaired activity tolerance, fall risk, decreased safety awareness, impaired functional reach all causing a decline in ADLs, functional transfers and mobility  Recommend STR when medically stable  Will continue to follow to address OT POC  The patient's raw score on the AM-PAC Daily Activity inpatient short form is 16, standardized score is 35 96, less than 39 4   Patients at this level are likely to benefit from discharge to post-acute rehabilitation services  Please refer to the recommendation of the Occupational Therapist for safe discharge planning  Plan   Treatment Interventions ADL retraining;Functional transfer training;UE strengthening/ROM; Endurance training;Cognitive reorientation;Patient/family training;Equipment evaluation/education; Compensatory technique education; Energy conservation; Activityengagement   Goal Expiration Date 11/08/22   OT Treatment Day 3   OT Frequency 3-5x/wk   Recommendation   OT Discharge Recommendation Post acute rehabilitation services   AM-PAC Daily Activity Inpatient   Lower Body Dressing 2   Bathing 2   Toileting 2   Upper Body Dressing 3   Grooming 3   Eating 4   Daily Activity Raw Score 16   Daily Activity Standardized Score (Calc for Raw Score >=11) 35 96   AM-PAC Applied Cognition Inpatient   Following a Speech/Presentation 4   Understanding Ordinary Conversation 4   Taking Medications 3   Remembering Where Things Are Placed or Put Away 4   Remembering List of 4-5 Errands 4   Taking Care of Complicated Tasks 4   Applied Cognition Raw Score 23   Applied Cognition Standardized Score 53 08   Modified Sherry Scale   Modified Egan Scale 4     Documentation completed by: Beth Siddiqui MS, OTR/L

## 2022-11-03 NOTE — PROGRESS NOTES
[facility] [facility]  Progress Note - [address] 1960, 65 y o  female MRN: [id number]  Unit/Bed#: Metsa 68 2 ite [address] 87 [id number] Encounter: [id number]  Primary Care Provider: ALEX Reyes   Date and time admitted to hospital: 10/21/2022  7:58 PM    PAD (peripheral artery disease) [facility])  Assessment & Plan  65year old female former smoker w/HTN, HLD, uncontrolled type II DM (A1c 9 8), hx CVA, presenting with nonhealing extensive L heel ulceration and R hallux and 5th digit ulceration  Vascular surgery consulted for input  S/p abdominal aortogram, LLE run off w/R ALLISON PTA/stent and LLE SFA shockwave angioplasty/FATOU 10/31/22 ([facility])    -Vein mapping 11/2/22 RIGHT AND LEFT LOWER LIMB:  The great saphenous vein is patent  from the groin to the ankle  The vein measures >2mm in caliber from the groin to the distal calf  -Post intervention SHARIF 11/1/22: Rt: 0 51 (0 80) Lt: 0 74/85/37  -CTA abd/pelvis w/ run off 11/1/22: Flow-limiting dissection flap at the prior arteriotomy site in the R CFA superimposed upon moderate to severe atherosclerotic disease  Focal severe atherosclerotic narrowing at the adductor canal with additional moderate atherosclerotic narrowing throughout the remainder of the R SFA    sCr/eGFR: 1 3/44  Hgb: 6 8 (7 6 11/2)    Recommendations:  -Bilateral tissue loss in the setting of uncontrolled type II DM and NYDIA on admission, now s/p angiogram w/intervention  Groin access is soft without hematoma or ecchymosis  -S/p angiogram 10/31/22 patient noted to be in acute respiratoy distress and hypoxic, requiring 10L O2 nonrebreather to keep O2 sat >90  Felt to be secondary to fluid overload  Patient diuresed and continues to improve clinically and weaning O2 as tolerated  -D/w Dr Giovanna Yeboah, L heel ulceration stable without OM  -Now s/p LLE angiogram w/ R ALLISON PTA/stent and L SFA shockwave angioplasty and FATOU   Two vessel run off to the LLE via AT/peroneal  PT chronically occluded   -Post intervention SHARIF obtained which did show improvement in LLE SHARIF however decrease in R SHARIF  Duplex obtained which did confirm elevated velocities of the R CFA- CTA obtained  -CTA reviewed which demonstrates flow limiting dissection of the R CFA likely secondary to closure device injury   -Patient will require surgical intervention to include R femoral endarterectomy and repair, with SFA intervention  Tentatively scheduled for OR 11/7/22  -Cardiology consulted, appreciated recs  Intermediate to high risk  Does not require further cardiac testing  -Vein mapping complete w/ adequate vein  -Medical management with ASA, statin, Plavix  -Continue local wound care per podiatry  -Medical management and glucose control per SLIM  -Will d/w Dr Aakash Peralta      Subjective:  Resting comfortable sitting on side of bed  Offers no complaints  Updated on surgical plan tentatively scheduled for Monday 11/7  Pt is agreeable  Vitals:  /60   Pulse 76   Temp 99 3 °F (37 4 °C) (Oral)   Resp 18   Ht 5' 3 75" (1 619 m)   Wt 110 kg (242 lb 11 6 oz)   SpO2 92%   BMI 41 99 kg/m²     I/Os:  I/O last 3 completed shifts:  In: -   Out: 800 [Urine:800]  No intake/output data recorded  Lab Results and Cultures:   Lab Results   Component Value Date    WBC 10 85 (H) 11/03/2022    HGB 6 8 (LL) 11/03/2022    HCT 23 1 (L) 11/03/2022    MCV 91 11/03/2022     11/03/2022     Lab Results   Component Value Date    CALCIUM 8 4 11/03/2022    K 3 6 11/03/2022    CO2 31 11/03/2022     11/03/2022    BUN 15 11/03/2022    CREATININE 1 30 11/03/2022     Lab Results   Component Value Date    INR 1 14 11/01/2022    INR 1 03 10/31/2022    INR 1 00 10/21/2022    PROTIME 14 6 (H) 11/01/2022    PROTIME 13 5 10/31/2022    PROTIME 13 2 10/21/2022        Blood Culture:   Lab Results   Component Value Date    BLOODCX No Growth After 5 Days   10/21/2022   ,   Urinalysis:   Lab Results   Component Value Date    COLORU Light Yellow 10/21/2022    CLARITYU Turbid 10/21/2022    SPECGRAV >=1 030 10/21/2022    PHUR 6 0 10/21/2022    PHUR 6 5 02/23/2018    LEUKOCYTESUR Negative 10/21/2022    NITRITE Negative 10/21/2022    GLUCOSEU 250 (1/4%) (A) 10/21/2022    KETONESU Negative 10/21/2022    BILIRUBINUR Negative 10/21/2022    BLOODU Large (A) 10/21/2022   ,   Urine Culture:   Lab Results   Component Value Date    URINECX >100,000 cfu/ml Escherichia coli (A) 10/21/2022   ,   Wound Culure: No results found for: WOUNDCULT    Medications:  Current Facility-Administered Medications   Medication Dose Route Frequency   • acetaminophen (TYLENOL) tablet 650 mg  650 mg Oral Q6H PRN   • albuterol (PROVENTIL HFA,VENTOLIN HFA) inhaler 2 puff  2 puff Inhalation Q4H PRN   • amLODIPine (NORVASC) tablet 10 mg  10 mg Oral Daily   • aspirin chewable tablet 81 mg  81 mg Oral Daily   • atorvastatin (LIPITOR) tablet 40 mg  40 mg Oral Daily With Dinner   • carvedilol (COREG) tablet 25 mg  25 mg Oral BID With Meals   • clopidogrel (PLAVIX) tablet 75 mg  75 mg Oral Daily   • gabapentin (NEURONTIN) capsule 200 mg  200 mg Oral BID   • heparin (porcine) 25,000 units in 0 45% NaCl 250 mL infusion (premix)  3-30 Units/kg/hr (Order-Specific) Intravenous Titrated   • heparin (porcine) injection 4,400 Units  4,400 Units Intravenous Q1H PRN   • heparin (porcine) injection 8,800 Units  8,800 Units Intravenous Q1H PRN   • hydrALAZINE (APRESOLINE) injection 10 mg  10 mg Intravenous Q6H PRN   • insulin glargine (LANTUS) subcutaneous injection 15 Units 0 15 mL  15 Units Subcutaneous HS   • insulin lispro (HumaLOG) 100 units/mL subcutaneous injection 1-6 Units  1-6 Units Subcutaneous 4x Daily (AC & HS)   • insulin lispro (HumaLOG) 100 units/mL subcutaneous injection 5 Units  5 Units Subcutaneous TID With Meals   • LORazepam (ATIVAN) tablet 0 5 mg  0 5 mg Oral Q8H PRN   • metoclopramide (REGLAN) injection 5 mg  5 mg Intravenous Q6H PRN   • morphine injection 4 mg  4 mg Intravenous Q4H PRN • nystatin (MYCOSTATIN) powder   Topical BID   • ondansetron (ZOFRAN) injection 4 mg  4 mg Intravenous Q6H PRN   • oxyCODONE (ROXICODONE) IR tablet 5 mg  5 mg Oral Q4H PRN    Or   • oxyCODONE (ROXICODONE) immediate release tablet 10 mg  10 mg Oral Q4H PRN   • potassium chloride (K-DUR,KLOR-CON) CR tablet 40 mEq  40 mEq Oral Once   • sertraline (ZOLOFT) tablet 100 mg  100 mg Oral Daily       Imaging:  As above    Physical Exam:    General appearance: alert and oriented, in no acute distress  Neurologic: Grossly normal  Neck: no adenopathy, no carotid bruit, no JVD, supple, symmetrical, trachea midline and thyroid not enlarged, symmetric, no tenderness/mass/nodules  Lungs: clear to auscultation bilaterally  Heart: regular rate and rhythm, S1, S2 normal, no murmur, click, rub or gallop  Abdomen: soft, non-tender; bowel sounds normal; no masses,  no organomegaly  Extremities: BLE warm, perfused, M/S intact  Bilateral foot wounds    Wound/Incision:              Pulse exam:  Radial: Right: 2+ Left[de-identified] 2+  DP: Right: doppler signal Left: doppler signal  PT: Right: doppler signal Left: doppler signal      Saniya Newton  11/3/2022  [facility], [phone]

## 2022-11-03 NOTE — ASSESSMENT & PLAN NOTE
Lab Results   Component Value Date    HGBA1C 9 8 (H) 10/25/2022     Recent Labs     11/02/22  1621 11/02/22 2034 11/03/22  0757 11/03/22  1108   POCGLU 136 163* 74 82     · Uncontrolled with A1C at 9 8  · Blood glucose controlled on lantus 30 units daily  · Change to lantus 15 units bedtime, add 3 units TID with meals  · Continue insulin sliding scale

## 2022-11-03 NOTE — PROGRESS NOTES
NEPHROLOGY PROGRESS NOTE   Venkatesh Arnold 65 y o  female MRN: [id number]  Unit/Bed#: Mets[address] 68 2 [address] 87 221-02 Encounter: [id number]      ASSESSMENT & PLAN:  1  Acute kidney injury, 1st episode creatinine peak at 1 44 and went down to 1 1, patient had a 2nd episode with creatinine rise on 10/30 to 1 39  Baseline creatinine around 0 7-1 1 since 2020  Status post intravenously fluids pre and post arteriogram on 10/31, received 180 cc of dye  Status post CTA on 11/01, received 130 cc of dye intravenously dye  Keep holding Ace inhibitors for now  Kidney function is stable with a creatinine 1 30  Avoid relative hypotension  Okay to resume diuretics, consider intravenously Lasix with blood transfusion  Resume p o  diuretics tomorrow  Follow daily labs  2  Left lower extremity wound, podiatry and vascular on board  Status post IR aortogram with runoff, left lower leg arteriogram with proximal and distal SFA shockwave lithotripsy and stent placement as well as right common iliac artery stent placement on 10/31  As per IR notes patient received 180 cc of intravenously dye  Status post CTA due to concern for stent migration, receive another 130 cc of dye on 11/01  As per vascular note plan for right femoral endarterectomy and repair with SFA intervention tentatively on 11/07  Follow daily labs    3  Hypertension, blood pressure acceptable, keep holding Ace inhibitors for now  Follow daily labs  Continue with Norvasc and Coreg  4  Lower extremity edema, recommend to consider Lasix intravenously with blood transfusion  Okay to resume Lasix p o  tomorrow as long as kidney function remains stable  Of course diuretics should be hold prior to surgery next week    5   Anemia, hemoglobin low at 6 8 today, getting blood transfusion    My plan and recommendation were discussed with Internal Medicine attending via [facility] text    SUBJECTIVE:  Patient seen and examined, feeling okay, denies significant chest pain, no shortness of breath, no nausea, no vomiting, abdominal pain  Getting blood transfusion during my evaluation    Patient reports that vascular is planning to surgery on Monday      OBJECTIVE:  Current Weight: Weight - Scale: 110 kg (242 lb 11 6 oz)  Vitals:    11/03/22 1040   BP: 137/60   Pulse: 76   Resp: 18   Temp: 99 3 °F (37 4 °C)   SpO2: 92%       Intake/Output Summary (Last 24 hours) at 11/3/2022 1102  Last data filed at 11/3/2022 0533  Gross per 24 hour   Intake --   Output 500 ml   Net -500 ml     General: conscious, cooperative, in not acute distress  Eyes: conjunctivae pale, anicteric sclerae  ENT: lips and mucous membranes moist  Neck: supple, no JVD  Chest: clear breath sounds bilateral, no crackles, ronchus or wheezings  CVS: distinct S1 & S2, normal rate, regular rhythm  Abdomen:  Obese, non-tender, non-distended, normoactive bowel sounds  Extremities:  2+ edema of both legs  Skin: no rash  Neuro: awake, alert, oriented        Medications:    Current Facility-Administered Medications:   •  acetaminophen (TYLENOL) tablet 650 mg, 650 mg, Oral, Q6H PRN, Sugey Wiseman PA-C, 650 mg at 10/23/22 1751  •  albuterol (PROVENTIL HFA,VENTOLIN HFA) inhaler 2 puff, 2 puff, Inhalation, Q4H PRN, Amy Parry MD, 2 puff at 10/30/22 0611  •  amLODIPine (NORVASC) tablet 10 mg, 10 mg, Oral, Daily, ALEX Flores, 10 mg at 11/03/22 9136  •  aspirin chewable tablet 81 mg, 81 mg, Oral, Daily, Sugey Wiseman PA-C, 81 mg at 11/03/22 0816  •  atorvastatin (LIPITOR) tablet 40 mg, 40 mg, Oral, Daily With Dinner, Ed Darnell MD, 40 mg at 11/02/22 1628  •  carvedilol (COREG) tablet 25 mg, 25 mg, Oral, BID With Meals, ALEX Flores, 25 mg at 11/03/22 0816  •  clopidogrel (PLAVIX) tablet 75 mg, 75 mg, Oral, Daily, Curtis Hardin PA-C, 75 mg at 11/03/22 5377  •  gabapentin (NEURONTIN) capsule 200 mg, 200 mg, Oral, BID, Sugey Wiseman PA-C, 200 mg at 11/03/22 0816  •  heparin (porcine) 25,000 units in 0 45% NaCl 250 mL infusion (premix), 3-30 Units/kg/hr (Order-Specific), Intravenous, Titrated, Gretchen Rajput DO, Last Rate: 18 7 mL/hr at 11/03/22 0701, 17 Units/kg/hr at 11/03/22 0701  •  heparin (porcine) injection 4,400 Units, 4,400 Units, Intravenous, Q1H PRN, Gretchen Rajput DO, 4,400 Units at 11/02/22 1630  •  heparin (porcine) injection 8,800 Units, 8,800 Units, Intravenous, Q1H PRN, Gretchen Rajput DO  •  hydrALAZINE (APRESOLINE) injection 10 mg, 10 mg, Intravenous, Q6H PRN, Sugey Wiseman PA-C, 10 mg at 10/27/22 1857  •  insulin glargine (LANTUS) subcutaneous injection 15 Units 0 15 mL, 15 Units, Subcutaneous, HS, Dayo Aguero MD, 15 Units at 11/02/22 2215  •  insulin lispro (HumaLOG) 100 units/mL subcutaneous injection 1-6 Units, 1-6 Units, Subcutaneous, 4x Daily (AC & HS), 1 Units at 11/02/22 2215 **AND** Fingerstick Glucose (POCT), , , 4x Daily AC and at bedtime, Sugey Wiseman PA-C  •  insulin lispro (HumaLOG) 100 units/mL subcutaneous injection 5 Units, 5 Units, Subcutaneous, TID With Meals, Dayo Aguero MD, 5 Units at 11/03/22 0820  •  LORazepam (ATIVAN) tablet 0 5 mg, 0 5 mg, Oral, Q8H PRN, Amy Morocho MD, 0 5 mg at 10/31/22 2108  •  metoclopramide (REGLAN) injection 5 mg, 5 mg, Intravenous, Q6H PRN, Jen Prakash DO, 5 mg at 10/23/22 8356  •  morphine injection 4 mg, 4 mg, Intravenous, Q4H PRN, Jen Prakash DO, 4 mg at 11/02/22 1623  •  nystatin (MYCOSTATIN) powder, , Topical, BID, Dayo Aguero MD, Given at 11/03/22 4600  •  ondansetron [facility]) injection 4 mg, 4 mg, Intravenous, Q6H PRN, Sugey Wiseman PA-C, 4 mg at 10/22/22 2241  •  oxyCODONE (ROXICODONE) IR tablet 5 mg, 5 mg, Oral, Q4H PRN, 5 mg at 11/03/22 1052 **OR** oxyCODONE (ROXICODONE) immediate release tablet 10 mg, 10 mg, Oral, Q4H PRN, Amy Parry MD, 10 mg at 11/03/22 3368  •  sertraline (ZOLOFT) tablet 100 mg, 100 mg, Oral, Daily, Sugey Wiseman PA-C, 100 mg at 11/03/22 0816    Invasive Devices:        Lab Results:   Results from last 7 days   Lab Units 11/03/22  0445 11/02/22  0607 11/01/22  2327 11/01/22 0445 10/31/22  0536   WBC Thousand/uL 10 85* 13 66* 12 64* 13 43* 12 59*   HEMOGLOBIN g/dL 6 8* 7 6* 7 1* 8 0* 7 8*   HEMATOCRIT % 23 1* 25 4* 23 0* 26 1* 25 7*   PLATELETS Thousands/uL 296 316 282 316 371   SODIUM mmol/L 142 140  --  139 138   POTASSIUM mmol/L 3 6 3 7  --  3 8 4 4   CHLORIDE mmol/L 104 101  --  102 103   CO2 mmol/L 31 31  --  30 28   BUN mg/dL 15 15  --  20 20   CREATININE mg/dL 1 30 1 35*  --  1 26 1 38*   CALCIUM mg/dL 8 4 9 0  --  8 9 9 4   MAGNESIUM mg/dL  --   --   --   --  1 7   PHOSPHORUS mg/dL  --   --   --   --  5 5*       Previous work up:  See previous notes      Portions of the record may have been created with voice recognition software  Occasional wrong word or "sound a like" substitutions may have occurred due to the inherent limitations of voice recognition software  Read the chart carefully and recognize, using context, where substitutions have occurred  If you have any questions, please contact the dictating provider

## 2022-11-04 LAB
ABO GROUP BLD BPU: NORMAL
ANION GAP SERPL CALCULATED.3IONS-SCNC: 10 MMOL/L (ref 4–13)
APTT PPP: 107 SECONDS (ref 23–37)
APTT PPP: 45 SECONDS (ref 23–37)
APTT PPP: 53 SECONDS (ref 23–37)
BASOPHILS # BLD AUTO: 0.05 THOUSANDS/ÂΜL (ref 0–0.1)
BASOPHILS NFR BLD AUTO: 0 % (ref 0–1)
BPU ID: NORMAL
BUN SERPL-MCNC: 17 MG/DL (ref 5–25)
CALCIUM SERPL-MCNC: 8.8 MG/DL (ref 8.3–10.1)
CHLORIDE SERPL-SCNC: 100 MMOL/L (ref 96–108)
CO2 SERPL-SCNC: 28 MMOL/L (ref 21–32)
CREAT SERPL-MCNC: 1.38 MG/DL (ref 0.6–1.3)
CROSSMATCH: NORMAL
EOSINOPHIL # BLD AUTO: 0.81 THOUSAND/ÂΜL (ref 0–0.61)
EOSINOPHIL NFR BLD AUTO: 7 % (ref 0–6)
ERYTHROCYTE [DISTWIDTH] IN BLOOD BY AUTOMATED COUNT: 15.2 % (ref 11.6–15.1)
GFR SERPL CREATININE-BSD FRML MDRD: 41 ML/MIN/1.73SQ M
GLUCOSE SERPL-MCNC: 117 MG/DL (ref 65–140)
GLUCOSE SERPL-MCNC: 137 MG/DL (ref 65–140)
GLUCOSE SERPL-MCNC: 154 MG/DL (ref 65–140)
GLUCOSE SERPL-MCNC: 174 MG/DL (ref 65–140)
GLUCOSE SERPL-MCNC: 211 MG/DL (ref 65–140)
HCT VFR BLD AUTO: 27.1 % (ref 34.8–46.1)
HGB BLD-MCNC: 8.4 G/DL (ref 11.5–15.4)
IMM GRANULOCYTES # BLD AUTO: 0.12 THOUSAND/UL (ref 0–0.2)
IMM GRANULOCYTES NFR BLD AUTO: 1 % (ref 0–2)
LYMPHOCYTES # BLD AUTO: 1.33 THOUSANDS/ÂΜL (ref 0.6–4.47)
LYMPHOCYTES NFR BLD AUTO: 11 % (ref 14–44)
MCH RBC QN AUTO: 28.6 PG (ref 26.8–34.3)
MCHC RBC AUTO-ENTMCNC: 31 G/DL (ref 31.4–37.4)
MCV RBC AUTO: 92 FL (ref 82–98)
MONOCYTES # BLD AUTO: 0.88 THOUSAND/ÂΜL (ref 0.17–1.22)
MONOCYTES NFR BLD AUTO: 8 % (ref 4–12)
NEUTROPHILS # BLD AUTO: 8.61 THOUSANDS/ÂΜL (ref 1.85–7.62)
NEUTS SEG NFR BLD AUTO: 73 % (ref 43–75)
NRBC BLD AUTO-RTO: 0 /100 WBCS
PLATELET # BLD AUTO: 284 THOUSANDS/UL (ref 149–390)
PMV BLD AUTO: 9 FL (ref 8.9–12.7)
POTASSIUM SERPL-SCNC: 3.8 MMOL/L (ref 3.5–5.3)
RBC # BLD AUTO: 2.94 MILLION/UL (ref 3.81–5.12)
SODIUM SERPL-SCNC: 138 MMOL/L (ref 135–147)
UNIT DISPENSE STATUS: NORMAL
UNIT PRODUCT CODE: NORMAL
UNIT PRODUCT VOLUME: 350 ML
UNIT RH: NORMAL
WBC # BLD AUTO: 11.8 THOUSAND/UL (ref 4.31–10.16)

## 2022-11-04 PROCEDURE — 85730 THROMBOPLASTIN TIME PARTIAL: CPT | Performed by: STUDENT IN AN ORGANIZED HEALTH CARE EDUCATION/TRAINING PROGRAM

## 2022-11-04 PROCEDURE — 99232 SBSQ HOSP IP/OBS MODERATE 35: CPT | Performed by: INTERNAL MEDICINE

## 2022-11-04 PROCEDURE — 97530 THERAPEUTIC ACTIVITIES: CPT

## 2022-11-04 PROCEDURE — 97116 GAIT TRAINING THERAPY: CPT

## 2022-11-04 PROCEDURE — 85025 COMPLETE CBC W/AUTO DIFF WBC: CPT | Performed by: STUDENT IN AN ORGANIZED HEALTH CARE EDUCATION/TRAINING PROGRAM

## 2022-11-04 PROCEDURE — 80048 BASIC METABOLIC PNL TOTAL CA: CPT | Performed by: STUDENT IN AN ORGANIZED HEALTH CARE EDUCATION/TRAINING PROGRAM

## 2022-11-04 PROCEDURE — 82948 REAGENT STRIP/BLOOD GLUCOSE: CPT

## 2022-11-04 PROCEDURE — 99232 SBSQ HOSP IP/OBS MODERATE 35: CPT | Performed by: SURGERY

## 2022-11-04 PROCEDURE — 97110 THERAPEUTIC EXERCISES: CPT

## 2022-11-04 PROCEDURE — 99232 SBSQ HOSP IP/OBS MODERATE 35: CPT | Performed by: STUDENT IN AN ORGANIZED HEALTH CARE EDUCATION/TRAINING PROGRAM

## 2022-11-04 RX ADMIN — INSULIN LISPRO 1 UNITS: 100 INJECTION, SOLUTION INTRAVENOUS; SUBCUTANEOUS at 11:24

## 2022-11-04 RX ADMIN — CARVEDILOL 25 MG: 25 TABLET, FILM COATED ORAL at 17:12

## 2022-11-04 RX ADMIN — HEPARIN SODIUM 4400 UNITS: 1000 INJECTION INTRAVENOUS; SUBCUTANEOUS at 08:27

## 2022-11-04 RX ADMIN — FUROSEMIDE 40 MG: 40 TABLET ORAL at 08:27

## 2022-11-04 RX ADMIN — AMLODIPINE BESYLATE 10 MG: 10 TABLET ORAL at 08:27

## 2022-11-04 RX ADMIN — GABAPENTIN 200 MG: 100 CAPSULE ORAL at 08:27

## 2022-11-04 RX ADMIN — INSULIN LISPRO 3 UNITS: 100 INJECTION, SOLUTION INTRAVENOUS; SUBCUTANEOUS at 08:00

## 2022-11-04 RX ADMIN — HEPARIN SODIUM 19 UNITS/KG/HR: 10000 INJECTION, SOLUTION INTRAVENOUS at 11:03

## 2022-11-04 RX ADMIN — SERTRALINE HYDROCHLORIDE 100 MG: 100 TABLET ORAL at 08:27

## 2022-11-04 RX ADMIN — HEPARIN SODIUM 21 UNITS/KG/HR: 10000 INJECTION, SOLUTION INTRAVENOUS at 23:34

## 2022-11-04 RX ADMIN — OXYCODONE HYDROCHLORIDE 10 MG: 10 TABLET ORAL at 18:38

## 2022-11-04 RX ADMIN — HEPARIN SODIUM 4400 UNITS: 1000 INJECTION INTRAVENOUS; SUBCUTANEOUS at 17:20

## 2022-11-04 RX ADMIN — INSULIN LISPRO 2 UNITS: 100 INJECTION, SOLUTION INTRAVENOUS; SUBCUTANEOUS at 21:36

## 2022-11-04 RX ADMIN — NYSTATIN: 100000 POWDER TOPICAL at 17:15

## 2022-11-04 RX ADMIN — INSULIN LISPRO 3 UNITS: 100 INJECTION, SOLUTION INTRAVENOUS; SUBCUTANEOUS at 11:25

## 2022-11-04 RX ADMIN — ATORVASTATIN CALCIUM 40 MG: 40 TABLET, FILM COATED ORAL at 17:12

## 2022-11-04 RX ADMIN — OXYCODONE HYDROCHLORIDE 10 MG: 10 TABLET ORAL at 08:31

## 2022-11-04 RX ADMIN — OXYCODONE HYDROCHLORIDE 10 MG: 10 TABLET ORAL at 23:08

## 2022-11-04 RX ADMIN — CARVEDILOL 25 MG: 25 TABLET, FILM COATED ORAL at 08:27

## 2022-11-04 RX ADMIN — INSULIN LISPRO 3 UNITS: 100 INJECTION, SOLUTION INTRAVENOUS; SUBCUTANEOUS at 17:00

## 2022-11-04 RX ADMIN — CLOPIDOGREL BISULFATE 75 MG: 75 TABLET ORAL at 08:27

## 2022-11-04 RX ADMIN — ASPIRIN 81 MG CHEWABLE TABLET 81 MG: 81 TABLET CHEWABLE at 08:27

## 2022-11-04 RX ADMIN — NYSTATIN: 100000 POWDER TOPICAL at 09:00

## 2022-11-04 RX ADMIN — INSULIN GLARGINE 15 UNITS: 100 INJECTION, SOLUTION SUBCUTANEOUS at 21:38

## 2022-11-04 RX ADMIN — INSULIN LISPRO 1 UNITS: 100 INJECTION, SOLUTION INTRAVENOUS; SUBCUTANEOUS at 17:00

## 2022-11-04 RX ADMIN — GABAPENTIN 200 MG: 100 CAPSULE ORAL at 17:12

## 2022-11-04 NOTE — ASSESSMENT & PLAN NOTE
65year old female former smoker w/HTN, HLD, uncontrolled type II DM (A1c 9 8), hx CVA, presenting with nonhealing extensive L heel ulceration and R hallux and 5th digit ulceration  Vascular surgery consulted for input  S/p abdominal aortogram, LLE run off w/R ALLISON PTA/stent and LLE SFA shockwave angioplasty/FATOU 10/31/22 ([facility])    -Post intervention SHARIF 11/1/22: Rt: 0 51 (0 80) Lt: 0 74/85/37  -CTA abd/pelvis w/ run off 11/1/22: Flow-limiting dissection flap at the prior arteriotomy site in the R CFA superimposed upon moderate to severe atherosclerotic disease  Focal severe atherosclerotic narrowing at the adductor canal with additional moderate atherosclerotic narrowing throughout the remainder of the R SFA  -Vein mapping 11/2/22: b/l GSV >2mm in caliber from the groin to the distal calf     sCr/eGFR: 1 3/44  Hgb: 8 4 (6 8 s/p PRBC)    Recommendations:  -Bilateral tissue loss in the setting of uncontrolled type II DM and NYDIA on admission, now s/p angiogram w/intervention  Groin access is soft without hematoma or ecchymosis  -S/p angiogram 10/31/22 patient noted to be in acute respiratory distress and hypoxic, requiring 10L O2 nonrebreather to keep O2 sat >90  Felt to be secondary to fluid overload  Patient diuresed and continues to improve clinically and weaning O2 as tolerated  -D/w Dr Lois Phan, L heel ulceration stable without OM  R foot tissue loss likely will require surgical intervention  -Now s/p LLE angiogram w/ R ALLISON PTA/stent and L SFA shockwave angioplasty and FATOU  Two vessel run off to the LLE via AT/peroneal  PT chronically occluded   -Post intervention SHARIF obtained which did show improvement in LLE SHARIF however decrease in R SHARIF   Duplex obtained which did confirm elevated velocities of the R CFA- CTA obtained  -CTA reviewed which demonstrates flow limiting dissection of the R CFA likely secondary to closure device injury   -Patient will require surgical intervention to include R femoral endarterectomy and repair, with SFA intervention  Tentatively scheduled for OR 11/7/22  -Cardiology consulted, appreciated recs  Intermediate to high risk   Does not require further cardiac testing  -Vein mapping complete w/ adequate vein  -Medical management with ASA, statin, Plavix  -Continue local wound care per podiatry  -Medical management and glucose control per SLIM  -Will d/w Dr Delpha Romberg Doctor

## 2022-11-04 NOTE — PHYSICAL THERAPY NOTE
Physical Therapy Treatment Note     11/04/22 1008   PT Last Visit   PT Visit Date 11/04/22   Note Type   Note Type Treatment   Pain Assessment   Pain Assessment Tool 0-10   Pain Score 8   Pain Location/Orientation Orientation: Right;Location: Foot   Restrictions/Precautions   Weight Bearing Precautions Per Order Yes   RLE Weight Bearing Per Order WBAT   LLE Weight Bearing Per Order (S)  WBAT  (in heel offloading globoped shoe)   Braces or Orthoses Other (Comment)  (R Surgical shoe,, LLE in globoped shoe)   Other Precautions WBS; Fall Risk;Pain;Telemetry;O2;Multiple lines   General   Chart Reviewed Yes   Cognition   Overall Cognitive Status WFL   Subjective   Subjective Pt  in bed supine upon entry, Agreeable to PT   Bed Mobility   Supine to Sit 5  Supervision   Additional items Increased time required;HOB elevated   Sit to Supine 4  Minimal assistance   Additional items LE management; Increased time required;HOB elevated   Transfers   Sit to Stand 4  Minimal assistance   Additional items Assist x 1; Increased time required;Verbal cues   Stand to Sit 5  Supervision   Additional items Increased time required;Verbal cues; Assist x 1   Stand pivot 5  Supervision   Additional items Increased time required;Verbal cues; Assist x 1   Toilet transfer 5  Supervision   Additional items Increased time required;Standard toilet;Verbal cues  (grab bar)   Ambulation/Elevation   Gait pattern Improper Weight shift;Decreased foot clearance; Forward Flexion; Wide MILTON; Inconsistent lance; Foward flexed; Short stride; Excessively slow;Decreased heel strike;Decreased toe off   Gait Assistance 5  Supervision   Additional items Assist x 1   Assistive Device Rolling walker   Distance 40ft   Balance   Static Sitting Good   Dynamic Sitting Fair +   Static Standing Fair   Dynamic Standing Fair -   Ambulatory Fair -   Endurance Deficit   Endurance Deficit Yes   Endurance Deficit Description pain   Activity Tolerance   Activity Tolerance Patient tolerated treatment well;Patient limited by pain   Nurse Made Aware Yes   Exercises   THR Sitting;Supine;Bilateral;AAROM;10 reps;AROM   Assessment   Prognosis Fair   Problem List Decreased strength;Decreased range of motion;Decreased endurance; Impaired balance;Decreased mobility;Obesity;Pain;Decreased skin integrity; Impaired judgement   Assessment Patient progressing well with mobility  Needed decreased assist for all mobility  Patient will be able to ambulate longer distance this session  Total assist provided for donning the globoped shoe and the surgical shoe on LEs  Improved posture and gait quality he noted this session  Total assist provided for nasreen care post BM  Cues for hand placement for STS transfers  Patient fatigues easily with exertion  Patient complained of bilateral lower extremity calf pain with ambulation and declined any further ambulation  Will continue to follow for PT POC and progress patient appropriately during the hospital stay  Barriers to Discharge Inaccessible home environment;Decreased caregiver support   Goals   Patient Goals None reported   STG Expiration Date 11/08/22   PT Treatment Day 3   Plan   Treatment/Interventions Functional transfer training;LE strengthening/ROM; Therapeutic exercise;Patient/family training;Equipment eval/education;Gait training;Bed mobility;Spoke to nursing   Progress Progressing toward goals   PT Frequency 4-6x/wk   Recommendation   PT Discharge Recommendation Post acute rehabilitation services   Equipment Recommended Walker   AM-PAC Basic Mobility Inpatient   Turning in Bed Without Bedrails 3   Lying on Back to Sitting on Edge of Flat Bed 3   Moving Bed to Chair 3   Standing Up From Chair 3   Walk in Room 3   Climb 3-5 Stairs 2   Basic Mobility Inpatient Raw Score 17   Basic Mobility Standardized Score 39 67   Highest Level Of Mobility   JH-HLM Goal 5: Stand one or more mins   JH-HLM Achieved 7: Walk 25 feet or more   End of Consult   Patient Position at End of Consult Seated edge of bed; All needs within reach         Brigitte Dooley    An AM-PAC basic mobility standardized score less than 42 9 suggest the patient may benefit from discharge to post-acute rehab services

## 2022-11-04 NOTE — PROGRESS NOTES
[facility]  Progress Note - Yolie Im 1960, 65 y o  female MRN: [id number]  Unit[facility]Bed[facility] [address] 221-02 Encounter: [id number]  Primary Care Provider: ALEX Higuera   Date and time admitted to hospital: 10/21/2022  7:58 PM    PAD (peripheral artery disease) [facility])  Assessment & Plan  65year old female former smoker w/HTN, HLD, uncontrolled type II DM (A1c 9 8), hx CVA, presenting with nonhealing extensive L heel ulceration and R hallux and 5th digit ulceration  Vascular surgery consulted for input  S/p abdominal aortogram, LLE run off w/R ALLISON PTA/stent and LLE SFA shockwave angioplasty/FATOU 10/31/22 (IR-[facility])    -Post intervention SHARIF 11/1/22: Rt: 0 51 (0 80) Lt: 0 74/85/37  -CTA abd/pelvis w/ run off 11/1/22: Flow-limiting dissection flap at the prior arteriotomy site in the R CFA superimposed upon moderate to severe atherosclerotic disease  Focal severe atherosclerotic narrowing at the adductor canal with additional moderate atherosclerotic narrowing throughout the remainder of the R SFA  -Vein mapping 11/2/22: b/l GSV >2mm in caliber from the groin to the distal calf     sCr/eGFR: 1 3/44  Hgb: 8 4 (6 8 s/p PRBC)    Recommendations:  -Bilateral tissue loss in the setting of uncontrolled type II DM and NYDIA on admission, now s/p angiogram w/intervention  Groin access is soft without hematoma or ecchymosis  -S/p angiogram 10/31/22 patient noted to be in acute respiratory distress and hypoxic, requiring 10L O2 nonrebreather to keep O2 sat >90  Felt to be secondary to fluid overload  Patient diuresed and continues to improve clinically and weaning O2 as tolerated  -D/w Dr Neeta Piedra L heel ulceration stable without OM  R foot tissue loss likely will require surgical intervention  -Now s/p LLE angiogram w/ R ALLISON PTA/stent and L SFA shockwave angioplasty and FATOU   Two vessel run off to the LLE via AT/peroneal  PT chronically occluded   -Post intervention SHARIF obtained which did show improvement in LLE SHARIF however decrease in R SHARIF  Duplex obtained which did confirm elevated velocities of the R CFA- CTA obtained  -CTA reviewed which demonstrates flow limiting dissection of the R CFA likely secondary to closure device injury   -Patient will require surgical intervention to include R femoral endarterectomy and repair, with SFA intervention  Tentatively scheduled for OR 11/7/22  -Cardiology consulted, appreciated recs  Intermediate to high risk  Does not require further cardiac testing  -Vein mapping complete w/ adequate vein  -Medical management with ASA, statin, Plavix  -Continue local wound care per podiatry  -Medical management and glucose control per SLIM  -Will d/w   Sunday Labor Doctor      Subjective:  Patient reports some discomfort of the right foot this morning, improves in dependent position  Remains motor/sensory intact  Left lower extremity without pain or discomfort  Patient in agreement to proceed with surgery on Monday  All questions/concerns addressed  Vitals:  /69   Pulse 76   Temp 99 1 °F (37 3 °C)   Resp 20   Ht 5' 3 75" (1 619 m)   Wt 111 kg (244 lb 12 4 oz)   SpO2 96%   BMI 42 35 kg/m²     I/Os:  I/O last 3 completed shifts: In: 350 [Blood:350]  Out: 900 [Urine:900]  No intake/output data recorded      Lab Results and Cultures:   CBC with diff: Lab Results   Component Value Date    WBC 11 80 (H) 11/04/2022    HGB 8 4 (L) 11/04/2022    HCT 27 1 (L) 11/04/2022    MCV 92 11/04/2022     11/04/2022    MCH 28 6 11/04/2022    MCHC 31 0 (L) 11/04/2022    RDW 15 2 (H) 11/04/2022    MPV 9 0 11/04/2022    NRBC 0 11/04/2022   ,   BMP/CMP:  Lab Results   Component Value Date    SODIUM 138 11/04/2022    K 3 8 11/04/2022     11/04/2022    CO2 28 11/04/2022    BUN 17 11/04/2022    CREATININE 1 38 (H) 11/04/2022    CALCIUM 8 8 11/04/2022    AST 39 10/24/2022    ALT 26 10/24/2022    ALKPHOS 100 10/24/2022    EGFR 41 11/04/2022   ,   Lipid Panel: No results found for: CHOL,   Coags:   Lab Results   Component Value Date    PTT 53 (H) 11/04/2022    INR 1 14 11/01/2022   ,     Blood Culture:   Lab Results   Component Value Date    BLOODCX No Growth After 5 Days   10/21/2022   ,   Urinalysis: Lab Results   Component Value Date    COLORU Light Yellow 10/21/2022    CLARITYU Turbid 10/21/2022    SPECGRAV >=1 030 10/21/2022    PHUR 6 0 10/21/2022    PHUR 6 5 02/23/2018    LEUKOCYTESUR Negative 10/21/2022    NITRITE Negative 10/21/2022    GLUCOSEU 250 (1/4%) (A) 10/21/2022    KETONESU Negative 10/21/2022    BILIRUBINUR Negative 10/21/2022    BLOODU Large (A) 10/21/2022   ,   Urine Culture:   Lab Results   Component Value Date    URINECX >100,000 cfu/ml Escherichia coli (A) 10/21/2022   ,   Wound Culure: No results found for: WOUNDCULT    Medications:  Current Facility-Administered Medications   Medication Dose Route Frequency   • acetaminophen (TYLENOL) tablet 650 mg  650 mg Oral Q6H PRN   • albuterol (PROVENTIL HFA,VENTOLIN HFA) inhaler 2 puff  2 puff Inhalation Q4H PRN   • amLODIPine (NORVASC) tablet 10 mg  10 mg Oral Daily   • aspirin chewable tablet 81 mg  81 mg Oral Daily   • atorvastatin (LIPITOR) tablet 40 mg  40 mg Oral Daily With Dinner   • carvedilol (COREG) tablet 25 mg  25 mg Oral BID With Meals   • clopidogrel (PLAVIX) tablet 75 mg  75 mg Oral Daily   • furosemide (LASIX) tablet 40 mg  40 mg Oral Daily   • gabapentin (NEURONTIN) capsule 200 mg  200 mg Oral BID   • heparin (porcine) 25,000 units in 0 45% NaCl 250 mL infusion (premix)  3-30 Units/kg/hr (Order-Specific) Intravenous Titrated   • heparin (porcine) injection 4,400 Units  4,400 Units Intravenous Q1H PRN   • heparin (porcine) injection 8,800 Units  8,800 Units Intravenous Q1H PRN   • hydrALAZINE (APRESOLINE) injection 10 mg  10 mg Intravenous Q6H PRN   • insulin glargine (LANTUS) subcutaneous injection 15 Units 0 15 mL  15 Units Subcutaneous HS   • insulin lispro (HumaLOG) 100 units/mL subcutaneous injection 1-6 Units  1-6 Units Subcutaneous 4x Daily (AC & HS)   • insulin lispro (HumaLOG) 100 units/mL subcutaneous injection 3 Units  3 Units Subcutaneous TID With Meals   • LORazepam (ATIVAN) tablet 0 5 mg  0 5 mg Oral Q8H PRN   • metoclopramide (REGLAN) injection 5 mg  5 mg Intravenous Q6H PRN   • morphine injection 4 mg  4 mg Intravenous Q4H PRN   • nystatin (MYCOSTATIN) powder   Topical BID   • ondansetron (ZOFRAN) injection 4 mg  4 mg Intravenous Q6H PRN   • oxyCODONE (ROXICODONE) IR tablet 5 mg  5 mg Oral Q4H PRN    Or   • oxyCODONE (ROXICODONE) immediate release tablet 10 mg  10 mg Oral Q4H PRN   • sertraline (ZOLOFT) tablet 100 mg  100 mg Oral Daily       Imaging:  Reviewed    Physical Exam:    General: Alert and oriented, in no acute distress  CV: Regular rate  Respiratory: Normal effort  Nasal cannula in place  Abdominal: Soft, non-tender   Extremities: Bilateral feet are warm, motor/sensory intact   Bandages in place to wounds  Neurologic: Grossly normal     Pulse exam:  Radial: Right: 2+ Left: 2+  DP: Right: doppler signal Left: doppler signal  PT: Right: doppler signal Left: doppler signal    Toy [place], KETURAH  11/4/2022

## 2022-11-04 NOTE — PROGRESS NOTES
NEPHROLOGY PROGRESS NOTE   Yolie Im 65 y o  female MRN: [id number]  Unit/Bed#: [facility] 68 2 [address] 221-02 Encounter: [id number]      ASSESSMENT & PLAN:  1  Acute kidney injury, 1st episode creatinine peak at 1 44 and went down to 1 1, patient had a 2nd episode with creatinine rise on 10/30 to 1 39  Baseline creatinine around 0 7-1 1 since 2020  Status post intravenously fluids pre and post arteriogram on 10/31, received 180 cc of dye  Status post CTA on 11/01, received 130 cc of dye intravenously dye  Keep holding Ace inhibitors for now  Creatinine stable at 1 38 today  Continue with oral diuretics with 40 mg of Lasix daily  Follow daily labs  Monitor ins and outs and follow daily weight  Hold diuretics prior to surgery on Monday    2  Left lower extremity wound, podiatry and vascular on board  Status post IR aortogram with runoff, left lower leg arteriogram with proximal and distal SFA shockwave lithotripsy and stent placement as well as right common iliac artery stent placement on 10/31  As per IR notes patient received 180 cc of intravenously dye  Status post CTA due to concern for stent migration, receive another 130 cc of dye on 11/01  As per vascular note plan for right femoral endarterectomy and repair with SFA intervention tentatively on 11/07  Follow daily labs    3  Hypertension, blood pressure acceptable, keep holding Ace inhibitors for now  Follow daily labs  Continue with Norvasc and Coreg  4  Lower extremity edema, recommend to consider Lasix intravenously with blood transfusion  Lasix was resumed yesterday, continue with 40 mg daily, monitor ins and outs, follow daily weight  Hold diuretics on Monday prior to surgery    5  Anemia, hemoglobin improved up to 8 4 today, status post blood transfusion yesterday  Monitor H&H and transfuse for hemoglobin less than 7    My plan and recommendation were discussed with Internal Medicine attending        SUBJECTIVE:  Patient seen and examined, doing okay, denies any chest pain, shortness of breath, no nausea or vomiting, no abdominal pain    OBJECTIVE:  Current Weight: Weight - Scale: 111 kg (244 lb 12 4 oz)  Vitals:    11/04/22 0738   BP: 159/69   Pulse: 76   Resp: 20   Temp: 99 1 °F (37 3 °C)   SpO2: 96%       Intake/Output Summary (Last 24 hours) at 11/4/2022 1123  Last data filed at 11/3/2022 1700  Gross per 24 hour   Intake 350 ml   Output 400 ml   Net -50 ml     General: conscious, cooperative, in not acute distress  Eyes: conjunctivae pale, anicteric sclerae  ENT: lips and mucous membranes moist  Neck: supple, no JVD  Chest: clear breath sounds bilateral, no crackles, ronchus or wheezings  CVS: distinct S1 & S2, normal rate, regular rhythm  Abdomen:  Obese, non-tender, non-distended, normoactive bowel sounds  Extremities:  2+ edema of both legs  Skin: no rash  Neuro: awake, alert, oriented      Medications:    Current Facility-Administered Medications:   •  acetaminophen (TYLENOL) tablet 650 mg, 650 mg, Oral, Q6H PRN, Sugey Wiseman PA-C, 650 mg at 10/23/22 1751  •  albuterol (PROVENTIL HFA,VENTOLIN HFA) inhaler 2 puff, 2 puff, Inhalation, Q4H PRN, Amy Parry MD, 2 puff at 10/30/22 0611  •  amLODIPine (NORVASC) tablet 10 mg, 10 mg, Oral, Daily, ALEX Villegas, 10 mg at 11/04/22 0827  •  aspirin chewable tablet 81 mg, 81 mg, Oral, Daily, Sugey Wiseman PA-C, 81 mg at 11/04/22 0827  •  atorvastatin (LIPITOR) tablet 40 mg, 40 mg, Oral, Daily With Dinner, Catrina Kovacs MD, 40 mg at 11/03/22 1733  •  carvedilol (COREG) tablet 25 mg, 25 mg, Oral, BID With Meals, ALEX Villegas, 25 mg at 11/04/22 0827  •  clopidogrel (PLAVIX) tablet 75 mg, 75 mg, Oral, Daily, Jae Hardin PA-C, 75 mg at 11/04/22 0827  •  furosemide (LASIX) tablet 40 mg, 40 mg, Oral, Daily, Vanessa Howard MD, 40 mg at 11/04/22 0827  •  gabapentin (NEURONTIN) capsule 200 mg, 200 mg, Oral, BID, Sugey Wiseman PA-C, 200 mg at 11/04/22 0827  • heparin (porcine) 25,000 units in 0 45% NaCl 250 mL infusion (premix), 3-30 Units/kg/hr (Order-Specific), Intravenous, Titrated, Malika Carpenter DO, Last Rate: 20 9 mL/hr at 11/04/22 1103, 19 Units/kg/hr at 11/04/22 1103  •  heparin (porcine) injection 4,400 Units, 4,400 Units, Intravenous, Q1H PRN, Malika Carton, DO, 4,400 Units at 11/04/22 0827  •  heparin (porcine) injection 8,800 Units, 8,800 Units, Intravenous, Q1H PRN, Malikanavneet Meiern, DO  •  hydrALAZINE (APRESOLINE) injection 10 mg, 10 mg, Intravenous, Q6H PRN, Sugey Wiseman PA-C, 10 mg at 10/27/22 1857  •  insulin glargine (LANTUS) subcutaneous injection 15 Units 0 15 mL, 15 Units, Subcutaneous, HS, Chano Reynolds MD, 15 Units at 11/03/22 2155  •  insulin lispro (HumaLOG) 100 units/mL subcutaneous injection 1-6 Units, 1-6 Units, Subcutaneous, 4x Daily (AC & HS), 1 Units at 11/02/22 2215 **AND** Fingerstick Glucose (POCT), , , 4x Daily AC and at bedtime, Sugey Wiseman PA-C  •  insulin lispro (HumaLOG) 100 units/mL subcutaneous injection 3 Units, 3 Units, Subcutaneous, TID With Meals, Chano Reynolds MD, 3 Units at 11/04/22 0800  •  LORazepam (ATIVAN) tablet 0 5 mg, 0 5 mg, Oral, Q8H PRN, Amy Blood MD, 0 5 mg at 10/31/22 2108  •  metoclopramide (REGLAN) injection 5 mg, 5 mg, Intravenous, Q6H PRN, Rayshawn Montelongo DO, 5 mg at 10/23/22 8943  •  morphine injection 4 mg, 4 mg, Intravenous, Q4H PRN, Rayshawn Montelongo DO, 4 mg at 11/02/22 1623  •  nystatin (MYCOSTATIN) powder, , Topical, BID, Chano Reynolds MD, Given at 11/04/22 0900  •  ondansetron (ZOFRAN) injection 4 mg, 4 mg, Intravenous, Q6H PRN, Sugey Wiseman PA-C, 4 mg at 10/22/22 2241  •  oxyCODONE (ROXICODONE) IR tablet 5 mg, 5 mg, Oral, Q4H PRN, 5 mg at 11/03/22 1052 **OR** oxyCODONE (ROXICODONE) immediate release tablet 10 mg, 10 mg, Oral, Q4H PRN, Amy Parry MD, 10 mg at 11/04/22 0831  •  sertraline (ZOLOFT) tablet 100 mg, 100 mg, Oral, Daily, Sugey MENDOZA KETURAH Wiseman, 100 mg at 11/04/22 0827    Invasive Devices:        Lab Results:   Results from last 7 days   Lab Units 11/04/22  0503 11/03/22  0445 11/02/22  0607 11/01/22  0445 10/31/22  0536   WBC Thousand/uL 11 80* 10 85* 13 66*   < > 12 59*   HEMOGLOBIN g/dL 8 4* 6 8* 7 6*   < > 7 8*   HEMATOCRIT % 27 1* 23 1* 25 4*   < > 25 7*   PLATELETS Thousands/uL 284 296 316   < > 371   SODIUM mmol/L 138 142 140   < > 138   POTASSIUM mmol/L 3 8 3 6 3 7   < > 4 4   CHLORIDE mmol/L 100 104 101   < > 103   CO2 mmol/L 28 31 31   < > 28   BUN mg/dL 17 15 15   < > 20   CREATININE mg/dL 1 38* 1 30 1 35*   < > 1 38*   CALCIUM mg/dL 8 8 8 4 9 0   < > 9 4   MAGNESIUM mg/dL  --   --   --   --  1 7   PHOSPHORUS mg/dL  --   --   --   --  5 5*    < > = values in this interval not displayed  Previous work up:  See previous notes      Portions of the record may have been created with voice recognition software  Occasional wrong word or "sound a like" substitutions may have occurred due to the inherent limitations of voice recognition software  Read the chart carefully and recognize, using context, where substitutions have occurred  If you have any questions, please contact the dictating provider

## 2022-11-04 NOTE — PLAN OF CARE
Problem: PHYSICAL THERAPY ADULT  Goal: Performs mobility at highest level of function for planned discharge setting  See evaluation for individualized goals  Description: Treatment/Interventions: Functional transfer training, LE strengthening/ROM, Therapeutic exercise, Endurance training, Patient/family training, Equipment eval/education, Bed mobility, Compensatory technique education, Gait training, Continued evaluation, Spoke to nursing, Spoke to MD, Spoke to case management, OT          See flowsheet documentation for full assessment, interventions and recommendations  Outcome: Progressing  Note: Prognosis: Fair  Problem List: Decreased strength, Decreased range of motion, Decreased endurance, Impaired balance, Decreased mobility, Obesity, Pain, Decreased skin integrity, Impaired judgement  Assessment: Patient progressing well with mobility  Needed decreased assist for all mobility  Patient will be able to ambulate longer distance this session  Total assist provided for donning the globoped shoe and the surgical shoe on LEs  Improved posture and gait quality he noted this session  Total assist provided for nasreen care post BM  Cues for hand placement for STS transfers  Patient fatigues easily with exertion  Patient complained of bilateral lower extremity calf pain with ambulation and declined any further ambulation  Will continue to follow for PT POC and progress patient appropriately during the hospital stay  Barriers to Discharge: Inaccessible home environment, Decreased caregiver support  Barriers to Discharge Comments: lives alone, stairs  PT Discharge Recommendation: Post acute rehabilitation services    See flowsheet documentation for full assessment

## 2022-11-04 NOTE — ASSESSMENT & PLAN NOTE
Hemoglobin fluctuating with baseline around 10  Suspect component of dilution and recent IR angiogram  S/p 1 unit transfused, H/H improved  FOBT neg, no current source of bleeding, groin without hematoma

## 2022-11-04 NOTE — ASSESSMENT & PLAN NOTE
Lab Results   Component Value Date    HGBA1C 9 8 (H) 10/25/2022     Recent Labs     11/03/22  1611 11/03/22  2048 11/04/22  0757 11/04/22  1049   POCGLU 91 149* 137 174*     · Uncontrolled with A1C at 9 8  · Blood glucose controlled on lantus 30 units daily  · Change to lantus 15 units bedtime, add 3 units TID with meals  · Continue insulin sliding scale

## 2022-11-04 NOTE — PROGRESS NOTES
[address] [facility]  Progress Note - [facility] [address] 1960, 65 y o  female MRN: [id number]  Unit/Bed#: [address] [id number] Encounter: [id number]  Primary Care Provider: ALEX Bell   Date and time admitted to hospital: 10/21/2022  7:58 PM    * Non healing left heel wound  Assessment & Plan  · In setting of uncontrolled diabetes and known PAD  · Arterial duplex reviewed, "Great toe pressure of 35 mm Hg, below healing threshold for a diabetic"  · MRI of the foot noted for "a large plantar lateral heel ulcer, without underlying soft tissue abscess  · Received Zosyn x7, no further abx per ID  · Status post arteriogram with angioplasty  · Continue aspirin, Plavix and statin  · Podiatry evaluated, recommending outpatient wound care, no surgical intervention  · IR arteriogram completed on 10/31 left  lower leg arteriogram with proximal and distal SFA shockwave balloon lithotripsy and stent placement, as well as right common iliac artery stent placement  · Repeat imaging show increased velocity with flow limiting dissection of right lower limb  · Heparin gtt  · Vascular planning for bypass surgery this Monday  · Cardiology evaluated for clearance    PAD (peripheral artery disease) ([facility] [address] )  Assessment & Plan  · Vascular surgery input appreciated  · Continue with Plavix, aspirin, statin therapy  · Status post arteriogram on 11/01 with angioplasty, with stent placement on left lower leg and right common iliac artery  · Plan for further surgery this monday        Shortness of breath  Assessment & Plan  Patient did have episode of shortness of breath, with increasing oxygen requirements  Suspected likely due volume overload, elevated proBNP, x-ray imaging showing edema  Treated with IV Lasix with good urine output and improvement O2 requirement  2D echo reviewed, normal ef with normal DD, no sig wall motion abn  Per nephrology, started on po lasix 40mg once daily      Acute on chronic anemia  Assessment & Plan  Hemoglobin fluctuating with baseline around 10  Suspect component of dilution and recent IR angiogram  S/p 1 unit transfused, H/H improved  FOBT neg, no current source of bleeding, groin without hematoma    Wound of lower extremity  Assessment & Plan  MRI LLE without evidence of osteomyelitis, discontinue abx  Follow up with podiatry for continue wound check outpatient    Type 2 diabetes mellitus with hyperglycemia, with long-term current use of insulin [facility])  Assessment & Plan  Lab Results   Component Value Date    HGBA1C 9 8 (H) 10/25/2022     Recent Labs     11/03/22  1611 11/03/22  2048 11/04/22  0757 11/04/22  1049   POCGLU 91 149* 137 174*     · Uncontrolled with A1C at 9 8  · Blood glucose controlled on lantus 30 units daily  · Change to lantus 15 units bedtime, add 3 units TID with meals  · Continue insulin sliding scale    Benign essential hypertension  Assessment & Plan  Continue current regimen, BP currently controlled  Holding lisinopril, resume when able    Depression, recurrent (HCC)  Assessment & Plan  Continue sertraline, Xanax PRN        VTE Pharmacologic Prophylaxis:   Pharmacologic: Heparin Drip  Mechanical VTE Prophylaxis in Place: Yes    Patient Centered Rounds: I have performed bedside rounds with nursing staff today  Discussions with Specialists or Other Care Team Provider: Vascular    Education and Discussions with Family / Patient: patient    Time Spent for Care: 30 minutes  More than 50% of total time spent on counseling and coordination of care as described above  Current Length of Stay: 13 day(s)    Current Patient Status: Inpatient   Certification Statement: The patient will continue to require additional inpatient hospital stay due to pending surgery monday    Discharge Plan: pending    Code Status: Level 1 - Full Code      Subjective:   No events overnight   Upon amb continues to have some pain in both legs, improved with rest  Denies fevers, chills, CP or SOB  Objective:     Vitals:   Temp (24hrs), Av °F (37 2 °C), Min:97 9 °F (36 6 °C), Max:99 6 °F (37 6 °C)    Temp:  [97 9 °F (36 6 °C)-99 6 °F (37 6 °C)] 99 1 °F (37 3 °C)  HR:  [76-85] 76  Resp:  [18-20] 20  BP: (139-159)/(63-72) 159/69  SpO2:  [89 %-96 %] 96 %  Body mass index is 42 35 kg/m²  Input and Output Summary (last 24 hours): Intake/Output Summary (Last 24 hours) at 2022 1143  Last data filed at 11/3/2022 1700  Gross per 24 hour   Intake 350 ml   Output 400 ml   Net -50 ml       Physical Exam:     Physical Exam  Vitals reviewed  Constitutional:       General: She is not in acute distress  Appearance: Normal appearance  HENT:      Head: Atraumatic  Cardiovascular:      Rate and Rhythm: Regular rhythm  Heart sounds: Normal heart sounds  Pulmonary:      Effort: Pulmonary effort is normal  No respiratory distress  Abdominal:      General: Bowel sounds are normal  There is no distension  Palpations: Abdomen is soft  Musculoskeletal:         General: No swelling  Right lower leg: Edema present  Left lower leg: Edema present  Skin:     General: Skin is warm  Neurological:      Mental Status: She is alert and oriented to person, place, and time  Motor: No weakness     Psychiatric:         Mood and Affect: Mood normal          Additional Data:     Labs:    Results from last 7 days   Lab Units 22  0503   WBC Thousand/uL 11 80*   HEMOGLOBIN g/dL 8 4*   HEMATOCRIT % 27 1*   PLATELETS Thousands/uL 284   NEUTROS PCT % 73   LYMPHS PCT % 11*   MONOS PCT % 8   EOS PCT % 7*     Results from last 7 days   Lab Units 22  0503   SODIUM mmol/L 138   POTASSIUM mmol/L 3 8   CHLORIDE mmol/L 100   CO2 mmol/L 28   BUN mg/dL 17   CREATININE mg/dL 1 38*   ANION GAP mmol/L 10   CALCIUM mg/dL 8 8   GLUCOSE RANDOM mg/dL 117     Results from last 7 days   Lab Units 22  2327   INR  1 14     Results from last 7 days   Lab Units 22  1049 11/04/22  0757 11/03/22 2048 11/03/22  1611 11/03/22  1108 11/03/22  0757 11/02/22  2034 11/02/22  1621 11/02/22  1130 11/02/22  0736 11/01/22 2058 11/01/22  1601   POC GLUCOSE mg/dl 174* 137 149* 91 82 74 163* 136 146* 78 170* 174*                   * I Have Reviewed All Lab Data Listed Above  * Additional Pertinent Lab Tests Reviewed: All Labs For Current Hospital Admission Reviewed    Imaging:    XR chest portable    Result Date: 10/27/2022  Impression: Mild pulmonary vascular congestion  Workstation performed: [id number]     XR chest 1 view portable    Result Date: 10/22/2022  Impression: No acute cardiopulmonary disease  Workstation performed: [id number]     XR foot 3+ views RIGHT    Result Date: 10/22/2022  Impression: No acute osseous abnormality  Workstation performed: [id number]     CT lower extremity w contrast left    Result Date: 10/22/2022  Impression: No acute bony process is seen  Moderate superficial soft tissue edema in the foot, ankle, and leg, superimposed cellulitis considered in the appropriate clinical setting  Correlation with patient's symptoms and laboratory values recommended  No discrete drainable collection identified  Other findings as above   Workstation performed: [id number]       Recent Cultures (last 7 days):           Last 24 Hours Medication List:   Current Facility-Administered Medications   Medication Dose Route Frequency Provider Last Rate   • acetaminophen  650 mg Oral Q6H PRN Sugey Wiseman PA-C     • albuterol  2 puff Inhalation Q4H PRN Amy Parry MD     • amLODIPine  10 mg Oral Daily Hood ALEX Watson     • aspirin  81 mg Oral Daily Sugey Wiseman PA-C     • atorvastatin  40 mg Oral Daily With [address], MD     • carvedilol  25 mg Oral BID With Meals Hood ALEX Watson     • clopidogrel  75 mg Oral Daily Bonilla Aldrich PA-C     • furosemide  40 mg Oral Daily Jenaro Scott MD     • gabapentin  200 mg Oral BID Megan Anderson KETURAH     • heparin (porcine)  3-30 Units/kg/hr (Order-Specific) Intravenous Titrated Kelley Bottcher, DO 19 Units/kg/hr (11/04/22 1103)   • heparin (porcine)  4,400 Units Intravenous Q1H PRN Kelley Bottcher, DO     • heparin (porcine)  8,800 Units Intravenous Q1H PRN Kelley Bottcher, DO     • hydrALAZINE  10 mg Intravenous Q6H PRN Sugey Wiseman PA-C     • insulin glargine  15 Units Subcutaneous HS Mackenzie Cannon MD     • insulin lispro  1-6 Units Subcutaneous 4x Daily (AC & HS) Sugey Wiseman PA-C     • insulin lispro  3 Units Subcutaneous TID With Meals Mackenzie Cannon MD     • LORazepam  0 5 mg Oral Q8H PRN Amy Trinh MD     • metoclopramide  5 mg Intravenous Q6H PRN Haritha Varner, DO     • morphine injection  4 mg Intravenous Q4H PRN Haritha Varner, DO     • nystatin   Topical BID Mackenzie Cannon MD     • ondansetron  4 mg Intravenous Q6H PRN Sugey Wiseman PA-C     • oxyCODONE  5 mg Oral Q4H PRN Amy Parry MD      Or   • oxyCODONE  10 mg Oral Q4H PRN Amy Trinh MD     • sertraline  100 mg Oral Daily Sugey Iglesias PA-C          Today, Patient Was Seen By: Mackenzie Cannon    ** Please Note: Dictation voice to text software may have been used in the creation of this document   **

## 2022-11-04 NOTE — ASSESSMENT & PLAN NOTE
Patient did have episode of shortness of breath, with increasing oxygen requirements  Suspected likely due volume overload, elevated proBNP, x-ray imaging showing edema  Treated with IV Lasix with good urine output and improvement O2 requirement  2D echo reviewed, normal ef with normal DD, no sig wall motion abn  Per nephrology, started on po lasix 40mg once daily

## 2022-11-05 LAB
ANION GAP SERPL CALCULATED.3IONS-SCNC: 7 MMOL/L (ref 4–13)
APTT PPP: 49 SECONDS (ref 23–37)
APTT PPP: 62 SECONDS (ref 23–37)
APTT PPP: 72 SECONDS (ref 23–37)
BASOPHILS # BLD AUTO: 0.04 THOUSANDS/ÂΜL (ref 0–0.1)
BASOPHILS NFR BLD AUTO: 0 % (ref 0–1)
BUN SERPL-MCNC: 18 MG/DL (ref 5–25)
CALCIUM SERPL-MCNC: 8.5 MG/DL (ref 8.3–10.1)
CHLORIDE SERPL-SCNC: 100 MMOL/L (ref 96–108)
CO2 SERPL-SCNC: 31 MMOL/L (ref 21–32)
CREAT SERPL-MCNC: 1.37 MG/DL (ref 0.6–1.3)
EOSINOPHIL # BLD AUTO: 0.63 THOUSAND/ÂΜL (ref 0–0.61)
EOSINOPHIL NFR BLD AUTO: 7 % (ref 0–6)
ERYTHROCYTE [DISTWIDTH] IN BLOOD BY AUTOMATED COUNT: 15 % (ref 11.6–15.1)
GFR SERPL CREATININE-BSD FRML MDRD: 41 ML/MIN/1.73SQ M
GLUCOSE SERPL-MCNC: 130 MG/DL (ref 65–140)
GLUCOSE SERPL-MCNC: 139 MG/DL (ref 65–140)
GLUCOSE SERPL-MCNC: 175 MG/DL (ref 65–140)
GLUCOSE SERPL-MCNC: 204 MG/DL (ref 65–140)
GLUCOSE SERPL-MCNC: 204 MG/DL (ref 65–140)
HCT VFR BLD AUTO: 25.8 % (ref 34.8–46.1)
HGB BLD-MCNC: 8 G/DL (ref 11.5–15.4)
IMM GRANULOCYTES # BLD AUTO: 0.06 THOUSAND/UL (ref 0–0.2)
IMM GRANULOCYTES NFR BLD AUTO: 1 % (ref 0–2)
LYMPHOCYTES # BLD AUTO: 1.22 THOUSANDS/ÂΜL (ref 0.6–4.47)
LYMPHOCYTES NFR BLD AUTO: 13 % (ref 14–44)
MCH RBC QN AUTO: 28.3 PG (ref 26.8–34.3)
MCHC RBC AUTO-ENTMCNC: 31 G/DL (ref 31.4–37.4)
MCV RBC AUTO: 91 FL (ref 82–98)
MONOCYTES # BLD AUTO: 0.67 THOUSAND/ÂΜL (ref 0.17–1.22)
MONOCYTES NFR BLD AUTO: 7 % (ref 4–12)
NEUTROPHILS # BLD AUTO: 7 THOUSANDS/ÂΜL (ref 1.85–7.62)
NEUTS SEG NFR BLD AUTO: 72 % (ref 43–75)
NRBC BLD AUTO-RTO: 0 /100 WBCS
PLATELET # BLD AUTO: 277 THOUSANDS/UL (ref 149–390)
PMV BLD AUTO: 9.1 FL (ref 8.9–12.7)
POTASSIUM SERPL-SCNC: 5 MMOL/L (ref 3.5–5.3)
RBC # BLD AUTO: 2.83 MILLION/UL (ref 3.81–5.12)
SODIUM SERPL-SCNC: 138 MMOL/L (ref 135–147)
WBC # BLD AUTO: 9.62 THOUSAND/UL (ref 4.31–10.16)

## 2022-11-05 PROCEDURE — 99232 SBSQ HOSP IP/OBS MODERATE 35: CPT | Performed by: SURGERY

## 2022-11-05 PROCEDURE — 99232 SBSQ HOSP IP/OBS MODERATE 35: CPT | Performed by: INTERNAL MEDICINE

## 2022-11-05 PROCEDURE — 99232 SBSQ HOSP IP/OBS MODERATE 35: CPT | Performed by: STUDENT IN AN ORGANIZED HEALTH CARE EDUCATION/TRAINING PROGRAM

## 2022-11-05 PROCEDURE — 82948 REAGENT STRIP/BLOOD GLUCOSE: CPT

## 2022-11-05 PROCEDURE — 80048 BASIC METABOLIC PNL TOTAL CA: CPT | Performed by: STUDENT IN AN ORGANIZED HEALTH CARE EDUCATION/TRAINING PROGRAM

## 2022-11-05 PROCEDURE — 85025 COMPLETE CBC W/AUTO DIFF WBC: CPT | Performed by: STUDENT IN AN ORGANIZED HEALTH CARE EDUCATION/TRAINING PROGRAM

## 2022-11-05 PROCEDURE — 85730 THROMBOPLASTIN TIME PARTIAL: CPT | Performed by: STUDENT IN AN ORGANIZED HEALTH CARE EDUCATION/TRAINING PROGRAM

## 2022-11-05 RX ADMIN — ATORVASTATIN CALCIUM 40 MG: 40 TABLET, FILM COATED ORAL at 17:00

## 2022-11-05 RX ADMIN — MORPHINE SULFATE 4 MG: 4 INJECTION INTRAVENOUS at 21:30

## 2022-11-05 RX ADMIN — INSULIN LISPRO 2 UNITS: 100 INJECTION, SOLUTION INTRAVENOUS; SUBCUTANEOUS at 21:34

## 2022-11-05 RX ADMIN — INSULIN GLARGINE 15 UNITS: 100 INJECTION, SOLUTION SUBCUTANEOUS at 21:33

## 2022-11-05 RX ADMIN — INSULIN LISPRO 2 UNITS: 100 INJECTION, SOLUTION INTRAVENOUS; SUBCUTANEOUS at 17:45

## 2022-11-05 RX ADMIN — OXYCODONE HYDROCHLORIDE 10 MG: 10 TABLET ORAL at 17:47

## 2022-11-05 RX ADMIN — GABAPENTIN 200 MG: 100 CAPSULE ORAL at 17:45

## 2022-11-05 RX ADMIN — OXYCODONE HYDROCHLORIDE 10 MG: 10 TABLET ORAL at 11:49

## 2022-11-05 RX ADMIN — ASPIRIN 81 MG CHEWABLE TABLET 81 MG: 81 TABLET CHEWABLE at 08:26

## 2022-11-05 RX ADMIN — HEPARIN SODIUM 21 UNITS/KG/HR: 10000 INJECTION, SOLUTION INTRAVENOUS at 13:52

## 2022-11-05 RX ADMIN — FUROSEMIDE 40 MG: 40 TABLET ORAL at 08:26

## 2022-11-05 RX ADMIN — INSULIN LISPRO 3 UNITS: 100 INJECTION, SOLUTION INTRAVENOUS; SUBCUTANEOUS at 17:00

## 2022-11-05 RX ADMIN — OXYCODONE HYDROCHLORIDE 10 MG: 10 TABLET ORAL at 05:48

## 2022-11-05 RX ADMIN — NYSTATIN: 100000 POWDER TOPICAL at 17:51

## 2022-11-05 RX ADMIN — GABAPENTIN 200 MG: 100 CAPSULE ORAL at 08:26

## 2022-11-05 RX ADMIN — SERTRALINE HYDROCHLORIDE 100 MG: 100 TABLET ORAL at 08:26

## 2022-11-05 RX ADMIN — INSULIN LISPRO 3 UNITS: 100 INJECTION, SOLUTION INTRAVENOUS; SUBCUTANEOUS at 11:16

## 2022-11-05 RX ADMIN — INSULIN LISPRO 3 UNITS: 100 INJECTION, SOLUTION INTRAVENOUS; SUBCUTANEOUS at 08:26

## 2022-11-05 RX ADMIN — CARVEDILOL 25 MG: 25 TABLET, FILM COATED ORAL at 17:00

## 2022-11-05 RX ADMIN — LORAZEPAM 0.5 MG: 0.5 TABLET ORAL at 17:47

## 2022-11-05 RX ADMIN — AMLODIPINE BESYLATE 10 MG: 10 TABLET ORAL at 08:26

## 2022-11-05 RX ADMIN — CARVEDILOL 25 MG: 25 TABLET, FILM COATED ORAL at 08:26

## 2022-11-05 RX ADMIN — NYSTATIN: 100000 POWDER TOPICAL at 08:30

## 2022-11-05 RX ADMIN — CLOPIDOGREL BISULFATE 75 MG: 75 TABLET ORAL at 08:26

## 2022-11-05 RX ADMIN — INSULIN LISPRO 1 UNITS: 100 INJECTION, SOLUTION INTRAVENOUS; SUBCUTANEOUS at 11:16

## 2022-11-05 NOTE — PLAN OF CARE
Problem: Potential for Falls  Goal: Patient will remain free of falls  Description: INTERVENTIONS:  - Educate patient/family on patient safety including physical limitations  - Instruct patient to call for assistance with activity   - Consult OT/PT to assist with strengthening/mobility   - Keep Call bell within reach  - Keep bed low and locked with side rails adjusted as appropriate  - Keep care items and personal belongings within reach  - Initiate and maintain comfort rounds  - Make Fall Risk Sign visible to staff  - Offer Toileting every 2 Hours, in advance of need  - Initiate/Maintain bed  chair alarm  - Obtain necessary fall risk management equipment: bed chair alarm, call bell, gripper sock, walker, bedside commode  - Apply yellow socks and bracelet for high fall risk patients  - Consider moving patient to room near nurses station  Outcome: Progressing     Problem: MOBILITY - ADULT  Goal: Maintain or return to baseline ADL function  Description: INTERVENTIONS:  -  Assess patient's ability to carry out ADLs; assess patient's baseline for ADL function and identify physical deficits which impact ability to perform ADLs (bathing, care of mouth/teeth, toileting, grooming, dressing, etc )  - Assess/evaluate cause of self-care deficits   - Assess range of motion  - Assess patient's mobility; develop plan if impaired  - Assess patient's need for assistive devices and provide as appropriate  - Encourage maximum independence but intervene and supervise when necessary  - Involve family in performance of ADLs  - Assess for home care needs following discharge   - Consider OT consult to assist with ADL evaluation and planning for discharge  - Provide patient education as appropriate  Outcome: Progressing  Goal: Maintains/Returns to pre admission functional level  Description: INTERVENTIONS:  - Perform BMAT or MOVE assessment daily    - Set and communicate daily mobility goal to care team and patient/family/caregiver     - Collaborate with rehabilitation services on mobility goals if consulted  - Assist patient in repositioning every 2 hours    - Dangle patient 3 times a day  - Stand patient 3 times a day  - Out of bed to chair as tolerated  - Out of bed for meals  - Out of bed for toileting  - Record patient progress and toleration of activity level   Outcome: Progressing     Problem: PAIN - ADULT  Goal: Verbalizes/displays adequate comfort level or baseline comfort level  Description: Interventions:  - Encourage patient to monitor pain and request assistance  - Assess pain using appropriate pain scale  - Administer analgesics based on type and severity of pain and evaluate response  - Implement non-pharmacological measures as appropriate and evaluate response  - Consider cultural and social influences on pain and pain management  - Notify physician/advanced practitioner if interventions unsuccessful or patient reports new pain  Outcome: Progressing     Problem: INFECTION - ADULT  Goal: Absence or prevention of progression during hospitalization  Description: INTERVENTIONS:  - Assess and monitor for signs and symptoms of infection  - Monitor lab/diagnostic results  - Monitor all insertion sites, i e  indwelling lines, tubes, and drains  - Administer medications as ordered  - Instruct and encourage patient and family to use good hand hygiene technique  Outcome: Progressing     Problem: SAFETY ADULT  Goal: Patient will remain free of falls  Description: INTERVENTIONS:  - Educate patient/family on patient safety including physical limitations  - Instruct patient to call for assistance with activity   - Consult OT/PT to assist with strengthening/mobility   - Keep Call bell within reach  - Keep bed low and locked with side rails adjusted as appropriate  - Keep care items and personal belongings within reach  - Initiate and maintain comfort rounds  - Make Fall Risk Sign visible to staff  - Offer Toileting every 2 Hours, in advance of need  - Initiate/Maintain bed  chair alarm  - Obtain necessary fall risk management equipment: bed chair alarm, call bell, gripper sock, walker, bedside commode  - Apply yellow socks and bracelet for high fall risk patients  - Consider moving patient to room near nurses station  Outcome: Progressing  Goal: Maintain or return to baseline ADL function  Description: INTERVENTIONS:  -  Assess patient's ability to carry out ADLs; assess patient's baseline for ADL function and identify physical deficits which impact ability to perform ADLs (bathing, care of mouth/teeth, toileting, grooming, dressing, etc )  - Assess/evaluate cause of self-care deficits   - Assess range of motion  - Assess patient's mobility; develop plan if impaired  - Assess patient's need for assistive devices and provide as appropriate  - Encourage maximum independence but intervene and supervise when necessary  - Involve family in performance of ADLs  - Assess for home care needs following discharge   - Consider OT consult to assist with ADL evaluation and planning for discharge  - Provide patient education as appropriate  Outcome: Progressing  Goal: Maintains/Returns to pre admission functional level  Description: INTERVENTIONS:  - Perform BMAT or MOVE assessment daily    - Set and communicate daily mobility goal to care team and patient/family/caregiver  - Collaborate with rehabilitation services on mobility goals if consulted  - Assist patient in repositioning every 2 hours    - Dangle patient 3 times a day  - Stand patient 3 times a day  - Out of bed to chair as tolerated  - Out of bed for meals  - Out of bed for toileting  - Record patient progress and toleration of activity level   Outcome: Progressing     Problem: DISCHARGE PLANNING  Goal: Discharge to home or other facility with appropriate resources  Description: INTERVENTIONS:  - Identify barriers to discharge w/patient   - Arrange for needed discharge resources and transportation as appropriate  - Identify discharge learning needs  - Refer to Case Management Department for coordinating discharge planning if the patient needs post-hospital services based on physician/advanced practitioner order   Outcome: Progressing     Problem: Knowledge Deficit  Goal: Patient/family/caregiver demonstrates understanding of disease process, treatment plan, medications, and discharge instructions  Description: Complete learning assessment and assess knowledge base    Interventions:  - Provide teaching at level of understanding  - Provide teaching via preferred learning methods  Outcome: Progressing     Problem: METABOLIC, FLUID AND ELECTROLYTES - ADULT  Goal: Electrolytes maintained within normal limits  Description: INTERVENTIONS:  - Monitor labs and assess patient for signs and symptoms of electrolyte imbalances  - Administer electrolyte replacement as ordered  - Monitor response to electrolyte replacements, including repeat lab results as appropriate  - Instruct patient on fluid and nutrition as appropriate  Outcome: Progressing  Goal: Fluid balance maintained  Description: INTERVENTIONS:  - Monitor labs   - Monitor I/O and WT  - Instruct patient on fluid and nutrition as appropriate  - Assess for signs & symptoms of volume excess or deficit  Outcome: Progressing  Goal: Glucose maintained within target range  Description: INTERVENTIONS:  - Monitor Blood Glucose as ordered  - Assess for signs and symptoms of hyperglycemia and hypoglycemia  - Administer ordered medications to maintain glucose within target range  - Assess nutritional intake and initiate nutrition service referral as needed  Outcome: Progressing

## 2022-11-05 NOTE — PROGRESS NOTES
NEPHROLOGY PROGRESS NOTE   Katie Echols 65 y o  female MRN: [id number]  Unit/Bed#: [address] 2 [address] [id number] Encounter: [id number]  Reason for Consult: NYDIA    ASSESSMENT/PLAN:  NYDIA:  Suspect septic ATN vs CAN with auto regulatory failure with ACEi  -1st episode creatinine peaked at 1 44 and went down to 1 1  Patient had 2nd episode with creatinine rise to 1 39 on 10/30/2022  -Peak creatinine 1 44 on 10/24/2022  -baseline creatinine 0 7-1 1 since 2020  -s/p Agram 10/31 and CTA 11/1  -Imaging:  CT 2/20/2022 demonstrates 1 or more simple renal cysts but no hydronephrosis  -volume status hypervolemic  -nonoliguric    -Plan:  • Renal function stable  • Clinically, no acute indication for renal replacement therapy (dialysis)  • Strict I/Os, daily weights  • Continue diuretics for fluid overload  • Avoid nephrotoxins, NSAIDs, IV contrast if possible  • Avoid hypotension  Maintain MAP >65  • Trend BMP  • Adjust meds to appropriate GFR  • Optimize hemodynamic status to avoid delay in renal recovery    Hypertension/Volume status:  -originally presented with hypertensive urgency , resolved   -BP acceptable  -volume status hypervolemic  -Home medications:  Amlodipine 10 mg daily, lisinopril 40 mg daily, Toprol XL 25 mg daily  -Current medications:  Amlodipine 10 mg daily, carvedilol 25 mg b i d , Lasix 40 mg daily  -continue to hold lisinopril  -continue diuretics in the setting of fluid overload  Holding diuretics on Monday prior to surgery  -Optimize hemodynamic status to avoid delay in renal recovery  -recommend hold parameters on antihypertensive's for SBP <130 mmHg  -Avoid hypotension or fluctuations in blood pressure  Maintain MAP >65  -low sodium (2 gm) diet  -continue to trend    Anemia:  -Hgb 8 0    Goal >10  -s/p transfusion 11/3/2022  -Iron studies:  Low iron and iron saturation  -avoid IV Venofer in the setting of infection  -continue to trend  -Transfuse for Hgb <7 0  -management per primary team    PAD with nonhealing left foot wound:  -bilateral foot wounds, L>R  -s/p R ALLISON PTA/stent and L SFA shockwave angioplasty/FATOU 10/31/2022 by IR c/b R CFA access site issue  -plan for R CFA endarterectomy, possible SFA intervention 11/7/2022  -MRI with no OM  -continue antibiotics per primary team  -continue local wound care  -management per vascular surgery, Podiatry and primary team     UTI:  -cultures positive for E coli  -currently on antibiotics      SUBJECTIVE:  Patient awake, alert without complaints  Denies dyspnea  Feels edema is improving  Creatinine stable at 1 37  Adequate urine output but incompletely I/Os  VSS    A complete review of systems was performed  Pertinent positives and negatives noted in the HPI  Otherwise the review of systems was negative  OBJECTIVE:  Current Weight: Weight - Scale: 111 kg (244 lb 4 3 oz)  Vitals:    11/04/22 1542 11/04/22 2122 11/05/22 0549 11/05/22 0739   BP: 156/69 159/86  127/68   BP Location:  Left arm     Pulse: 73 77  74   Resp:  15  21   Temp: 98 9 °F (37 2 °C) 97 6 °F (36 4 °C)  97 8 °F (36 6 °C)   TempSrc:  Temporal  Oral   SpO2: 97% 92%  96%   Weight:   111 kg (244 lb 4 3 oz)    Height:         No intake or output data in the 24 hours ending 11/05/22 1235  General:  Awake, alert, appears comfortable and in no acute distress  Nontoxic  Skin:  No rash, warm, good skin turgor   Eyes:  PERRL, EOMI, sclerae nonicteric   no periorbital edema   ENT:  Moist mucous membranes  Neck:  Trachea midline, symmetric  No JVD  Chest:  Clear to auscultation bilaterally without wheezes, crackles or rhonchi  CVS:  Regular rate and rhythm without murmur, gallop or rub  S1 and S2 identified and normal   No S3, S4    Abdomen:  Soft, nontender, nondistended without masses  Normal bowel sounds x 4 quadrants  No bruit  Extremities:  Warm, pink, motor and sensory intact and well perfused  No cyanosis, pallor  1+ BLE edema extending to thigh  Neuro:  Awake, alert, oriented x3  Grossly intact  Psych:  Appropriate affect  Mentating appropriately    Normal mental status exam      Medications:    Current Facility-Administered Medications:   •  acetaminophen (TYLENOL) tablet 650 mg, 650 mg, Oral, Q6H PRN, Sugey Wiseman PA-C, 650 mg at 10/23/22 1751  •  albuterol (PROVENTIL HFA,VENTOLIN HFA) inhaler 2 puff, 2 puff, Inhalation, Q4H PRN, Amy Mike MD, 2 puff at 10/30/22 0611  •  amLODIPine (NORVASC) tablet 10 mg, 10 mg, Oral, Daily, Abdulaziz Orange, CRNP, 10 mg at 11/05/22 5718  •  aspirin chewable tablet 81 mg, 81 mg, Oral, Daily, Sugey Wiseman PA-C, 81 mg at 11/05/22 1012  •  atorvastatin (LIPITOR) tablet 40 mg, 40 mg, Oral, Daily With Dinner, Abril Melvin MD, 40 mg at 11/04/22 1712  •  carvedilol (COREG) tablet 25 mg, 25 mg, Oral, BID With Meals, Abdulaziz Orange, CRNP, 25 mg at 11/05/22 2502  •  clopidogrel (PLAVIX) tablet 75 mg, 75 mg, Oral, Daily, Thomas Burroughs PA-C, 75 mg at 11/05/22 1214  •  furosemide (LASIX) tablet 40 mg, 40 mg, Oral, Daily, Radha Randhawa MD, 40 mg at 11/05/22 1970  •  gabapentin (NEURONTIN) capsule 200 mg, 200 mg, Oral, BID, Sugey Wiseman PA-C, 200 mg at 11/05/22 9822  •  heparin (porcine) 25,000 units in 0 45% NaCl 250 mL infusion (premix), 3-30 Units/kg/hr (Order-Specific), Intravenous, Titrated, Low Abebe DO, Last Rate: 23 1 mL/hr at 11/05/22 0656, 21 Units/kg/hr at 11/05/22 0656  •  heparin (porcine) injection 4,400 Units, 4,400 Units, Intravenous, Q1H PRN, Low Abebe DO, 4,400 Units at 11/04/22 1720  •  heparin (porcine) injection 8,800 Units, 8,800 Units, Intravenous, Q1H PRN, Low Abebe DO  •  hydrALAZINE (APRESOLINE) injection 10 mg, 10 mg, Intravenous, Q6H PRN, Sugey Wiseman PA-C, 10 mg at 10/27/22 1857  •  insulin glargine (LANTUS) subcutaneous injection 15 Units 0 15 mL, 15 Units, Subcutaneous, HS, Radha Randhawa MD, 15 Units at 11/04/22 2138  •  insulin lispro (HumaLOG) 100 units/mL subcutaneous injection 1-6 Units, 1-6 Units, Subcutaneous, 4x Daily (AC & HS), 1 Units at 11/05/22 1116 **AND** Fingerstick Glucose (POCT), , , 4x Daily AC and at bedtime, Sugey Wiseman PA-C  •  insulin lispro (HumaLOG) 100 units/mL subcutaneous injection 3 Units, 3 Units, Subcutaneous, TID With Meals, Eduarda Neil MD, 3 Units at 11/05/22 1116  •  LORazepam (ATIVAN) tablet 0 5 mg, 0 5 mg, Oral, Q8H PRN, Amy Alas MD, 0 5 mg at 10/31/22 2108  •  metoclopramide (REGLAN) injection 5 mg, 5 mg, Intravenous, Q6H PRN, Ana María Warner DO, 5 mg at 10/23/22 1473  •  morphine injection 4 mg, 4 mg, Intravenous, Q4H PRN, Ana María Warner DO, 4 mg at 11/02/22 1623  •  nystatin (MYCOSTATIN) powder, , Topical, BID, Eduarda Neil MD, Given at 11/05/22 0830  •  ondansetron (ZOFRAN) injection 4 mg, 4 mg, Intravenous, Q6H PRN, Sugey Wiseman PA-C, 4 mg at 10/22/22 2241  •  oxyCODONE (ROXICODONE) IR tablet 5 mg, 5 mg, Oral, Q4H PRN, 5 mg at 11/03/22 1052 **OR** oxyCODONE (ROXICODONE) immediate release tablet 10 mg, 10 mg, Oral, Q4H PRN, Amy Parry MD, 10 mg at 11/05/22 1149  •  sertraline (ZOLOFT) tablet 100 mg, 100 mg, Oral, Daily, Sugey Wiseman PA-C, 100 mg at 11/05/22 6022    Laboratory Results:  Results from last 7 days   Lab Units 11/05/22  0541 11/04/22  0503 11/03/22  0445 11/02/22  0607 11/01/22  2327 11/01/22  0445 10/31/22  0536 10/30/22  0504   WBC Thousand/uL 9 62 11 80* 10 85* 13 66* 12 64* 13 43* 12 59* 12 90*   HEMOGLOBIN g/dL 8 0* 8 4* 6 8* 7 6* 7 1* 8 0* 7 8* 7 8*   HEMATOCRIT % 25 8* 27 1* 23 1* 25 4* 23 0* 26 1* 25 7* 25 5*   PLATELETS Thousands/uL 277 284 296 316 282 316 371 368   SODIUM mmol/L 138 138 142 140  --  139 138 137   POTASSIUM mmol/L 5 0 3 8 3 6 3 7  --  3 8 4 4 4 5   CHLORIDE mmol/L 100 100 104 101  --  102 103 103   CO2 mmol/L 31 28 31 31  --  30 28 28   BUN mg/dL 18 17 15 15  --  20 20 20   CREATININE mg/dL 1 37* 1 38* 1 30 1 35*  --  1 26 1 38* 1 39* CALCIUM mg/dL 8 5 8 8 8 4 9 0  --  8 9 9 4 9 2   MAGNESIUM mg/dL  --   --   --   --   --   --  1 7  --    PHOSPHORUS mg/dL  --   --   --   --   --   --  5 5*  --        I have personally reviewed the blood work as stated above and in my note  I have personally reviewed internal Medicine, co-consultants and previous nephrology notes

## 2022-11-05 NOTE — PLAN OF CARE
Problem: Potential for Falls  Goal: Patient will remain free of falls  Description: INTERVENTIONS:  - Educate patient/family on patient safety including physical limitations  - Instruct patient to call for assistance with activity   - Consult OT/PT to assist with strengthening/mobility   - Keep Call bell within reach  - Keep bed low and locked with side rails adjusted as appropriate  - Keep care items and personal belongings within reach  - Initiate and maintain comfort rounds  - Make Fall Risk Sign visible to staff  - Offer Toileting every 2 Hours, in advance of need  - Initiate/Maintain bed  chair alarm  - Obtain necessary fall risk management equipment: bed chair alarm, call bell, gripper sock, walker, bedside commode  - Apply yellow socks and bracelet for high fall risk patients  - Consider moving patient to room near nurses station  Outcome: Progressing     Problem: MOBILITY - ADULT  Goal: Maintain or return to baseline ADL function  Description: INTERVENTIONS:  -  Assess patient's ability to carry out ADLs; assess patient's baseline for ADL function and identify physical deficits which impact ability to perform ADLs (bathing, care of mouth/teeth, toileting, grooming, dressing, etc )  - Assess/evaluate cause of self-care deficits   - Assess range of motion  - Assess patient's mobility; develop plan if impaired  - Assess patient's need for assistive devices and provide as appropriate  - Encourage maximum independence but intervene and supervise when necessary  - Involve family in performance of ADLs  - Assess for home care needs following discharge   - Consider OT consult to assist with ADL evaluation and planning for discharge  - Provide patient education as appropriate  Outcome: Progressing  Goal: Maintains/Returns to pre admission functional level  Description: INTERVENTIONS:  - Perform BMAT or MOVE assessment daily    - Set and communicate daily mobility goal to care team and patient/family/caregiver     - Collaborate with rehabilitation services on mobility goals if consulted  - Assist patient in repositioning every 2 hours    - Dangle patient 3 times a day  - Stand patient 3 times a day  - Out of bed to chair as tolerated  - Out of bed for meals  - Out of bed for toileting  - Record patient progress and toleration of activity level   Outcome: Progressing     Problem: PAIN - ADULT  Goal: Verbalizes/displays adequate comfort level or baseline comfort level  Description: Interventions:  - Encourage patient to monitor pain and request assistance  - Assess pain using appropriate pain scale  - Administer analgesics based on type and severity of pain and evaluate response  - Implement non-pharmacological measures as appropriate and evaluate response  - Consider cultural and social influences on pain and pain management  - Notify physician/advanced practitioner if interventions unsuccessful or patient reports new pain  Outcome: Progressing     Problem: INFECTION - ADULT  Goal: Absence or prevention of progression during hospitalization  Description: INTERVENTIONS:  - Assess and monitor for signs and symptoms of infection  - Monitor lab/diagnostic results  - Monitor all insertion sites, i e  indwelling lines, tubes, and drains  - Administer medications as ordered  - Instruct and encourage patient and family to use good hand hygiene technique  Outcome: Progressing     Problem: SAFETY ADULT  Goal: Patient will remain free of falls  Description: INTERVENTIONS:  - Educate patient/family on patient safety including physical limitations  - Instruct patient to call for assistance with activity   - Consult OT/PT to assist with strengthening/mobility   - Keep Call bell within reach  - Keep bed low and locked with side rails adjusted as appropriate  - Keep care items and personal belongings within reach  - Initiate and maintain comfort rounds  - Make Fall Risk Sign visible to staff  - Offer Toileting every 2 Hours, in advance of need  - Initiate/Maintain bed  chair alarm  - Obtain necessary fall risk management equipment: bed chair alarm, call bell, gripper sock, walker, bedside commode  - Apply yellow socks and bracelet for high fall risk patients  - Consider moving patient to room near nurses station  Outcome: Progressing  Goal: Maintain or return to baseline ADL function  Description: INTERVENTIONS:  -  Assess patient's ability to carry out ADLs; assess patient's baseline for ADL function and identify physical deficits which impact ability to perform ADLs (bathing, care of mouth/teeth, toileting, grooming, dressing, etc )  - Assess/evaluate cause of self-care deficits   - Assess range of motion  - Assess patient's mobility; develop plan if impaired  - Assess patient's need for assistive devices and provide as appropriate  - Encourage maximum independence but intervene and supervise when necessary  - Involve family in performance of ADLs  - Assess for home care needs following discharge   - Consider OT consult to assist with ADL evaluation and planning for discharge  - Provide patient education as appropriate  Outcome: Progressing  Goal: Maintains/Returns to pre admission functional level  Description: INTERVENTIONS:  - Perform BMAT or MOVE assessment daily    - Set and communicate daily mobility goal to care team and patient/family/caregiver  - Collaborate with rehabilitation services on mobility goals if consulted  - Assist patient in repositioning every 2 hours    - Dangle patient 3 times a day  - Stand patient 3 times a day  - Out of bed to chair as tolerated  - Out of bed for meals  - Out of bed for toileting  - Record patient progress and toleration of activity level   Outcome: Progressing     Problem: DISCHARGE PLANNING  Goal: Discharge to home or other facility with appropriate resources  Description: INTERVENTIONS:  - Identify barriers to discharge w/patient   - Arrange for needed discharge resources and transportation as appropriate  - Identify discharge learning needs  - Refer to Case Management Department for coordinating discharge planning if the patient needs post-hospital services based on physician/advanced practitioner order   Outcome: Progressing     Problem: Knowledge Deficit  Goal: Patient/family/caregiver demonstrates understanding of disease process, treatment plan, medications, and discharge instructions  Description: Complete learning assessment and assess knowledge base    Interventions:  - Provide teaching at level of understanding  - Provide teaching via preferred learning methods  Outcome: Progressing     Problem: METABOLIC, FLUID AND ELECTROLYTES - ADULT  Goal: Electrolytes maintained within normal limits  Description: INTERVENTIONS:  - Monitor labs and assess patient for signs and symptoms of electrolyte imbalances  - Administer electrolyte replacement as ordered  - Monitor response to electrolyte replacements, including repeat lab results as appropriate  - Instruct patient on fluid and nutrition as appropriate  Outcome: Progressing  Goal: Fluid balance maintained  Description: INTERVENTIONS:  - Monitor labs   - Monitor I/O and WT  - Instruct patient on fluid and nutrition as appropriate  - Assess for signs & symptoms of volume excess or deficit  Outcome: Progressing  Goal: Glucose maintained within target range  Description: INTERVENTIONS:  - Monitor Blood Glucose as ordered  - Assess for signs and symptoms of hyperglycemia and hypoglycemia  - Administer ordered medications to maintain glucose within target range  - Assess nutritional intake and initiate nutrition service referral as needed  Outcome: Progressing     Problem: SKIN/TISSUE INTEGRITY - ADULT  Goal: Skin Integrity remains intact(Skin Breakdown Prevention)  Description: Assess:  -Perform Nicanor assessment every shift  -Clean and moisturize skin every shift  -Inspect skin when repositioning, toileting, and assisting with ADLS  -Assess under medical devices   -Assess extremities for adequate circulation and sensation     Bed Management:  -Have minimal linens on bed & keep smooth, unwrinkled  -Change linens as needed when moist or perspiring  -Avoid sitting or lying in one position for more than 2 hours while in bed    Toileting:  -Offer bedside commode  -Assess for incontinence every 2 hours  -Use incontinent care products after each incontinent episode     Activity:  -Mobilize patient 3 times a day  -Encourage or provide ROM exercises   -Turn and reposition patient every 2 Hours  -Use appropriate equipment to lift or move patient in bed  -Instruct/ Assist with weight shifting every 15 minutes when out of bed in chair  -Consider limitation of chair time 1 hour intervals    Skin Care:  -Avoid use of baby powder, tape, friction and shearing, hot water or constrictive clothing  -Relieve pressure over bony prominences using waffle cushion when in chair  -Do not massage red bony areas    Outcome: Progressing  Goal: Incision(s), wounds(s) or drain site(s) healing without S/S of infection  Description: INTERVENTIONS  - Assess and document dressing, incision, wound bed, drain sites and surrounding tissue  - Provide patient and family education  - Perform skin care/dressing changes everyday as ordered  Outcome: Progressing  Goal: Pressure injury heals and does not worsen  Description: Interventions:  - Implement low air loss mattress or specialty surface (Criteria met)  - Apply silicone foam dressing  - Instruct/assist with weight shifting every 15 minutes when in chair   - Limit chair time to 1 hour intervals  - Use special pressure reducing interventions such as waffle cushion when in chair   - Perform passive or active ROM   - Turn and reposition patient & offload bony prominences every 2 hours   - Utilize friction reducing device or surface for transfers   - Consider consults to  interdisciplinary teams such as wound care, PT/OT  - Use incontinent care products after each incontinent episode   - Consider nutrition services referral as needed  Outcome: Progressing     Problem: MUSCULOSKELETAL - ADULT  Goal: Maintain or return mobility to safest level of function  Description: INTERVENTIONS:  - Assess patient's ability to carry out ADLs; assess patient's baseline for ADL function and identify physical deficits which impact ability to perform ADLs (bathing, care of mouth/teeth, toileting, grooming, dressing, etc )  - Assess/evaluate cause of self-care deficits   - Assess range of motion  - Assess patient's mobility  - Assess patient's need for assistive devices and provide as appropriate  - Encourage maximum independence but intervene and supervise when necessary  - Involve family in performance of ADLs  - Assess for home care needs following discharge   - Consider OT consult to assist with ADL evaluation and planning for discharge  - Provide patient education as appropriate  Outcome: Progressing  Goal: Maintain proper alignment of affected body part  Description: INTERVENTIONS:  - Support, maintain and protect limb and body alignment  - Provide patient/ family with appropriate education  Outcome: Progressing     Problem: RESPIRATORY - ADULT  Goal: Achieves optimal ventilation and oxygenation  Description: INTERVENTIONS:  - Assess for changes in respiratory status  - Assess for changes in mentation and behavior  - Position to facilitate oxygenation and minimize respiratory effort  - Oxygen administered by appropriate delivery if ordered  - Initiate smoking cessation education as indicated  - Encourage broncho-pulmonary hygiene including cough, deep breathe, Incentive Spirometry  - Assess the need for suctioning and aspirate as needed  - Assess and instruct to report SOB or any respiratory difficulty  - Respiratory Therapy support as indicated  Outcome: Progressing     Problem: CARDIOVASCULAR - ADULT  Goal: Maintains optimal cardiac output and hemodynamic stability  Description: INTERVENTIONS:  - Monitor I/O, vital signs and rhythm  - Monitor for S/S and trends of decreased cardiac output  - Administer and titrate ordered vasoactive medications to optimize hemodynamic stability  - Assess quality of pulses, skin color and temperature  - Assess for signs of decreased coronary artery perfusion  - Instruct patient to report change in severity of symptoms  Outcome: Progressing     Problem: Prexisting or High Potential for Compromised Skin Integrity  Goal: Skin integrity is maintained or improved  Description: INTERVENTIONS:  - Identify patients at risk for skin breakdown  - Assess and monitor skin integrity  - Assess and monitor nutrition and hydration status  - Monitor labs   - Assess for incontinence   - Turn and reposition patient  - Assist with mobility/ambulation  - Relieve pressure over bony prominences  - Avoid friction and shearing  - Provide appropriate hygiene as needed including keeping skin clean and dry  - Evaluate need for skin moisturizer/barrier cream  - Collaborate with interdisciplinary team   - Patient/family teaching  - Consider wound care consult   Outcome: Progressing

## 2022-11-05 NOTE — ASSESSMENT & PLAN NOTE
Lab Results   Component Value Date    HGBA1C 9 8 (H) 10/25/2022     Recent Labs     11/04/22  1540 11/04/22  2116 11/05/22  0736 11/05/22  1111   POCGLU 154* 211* 130 175*     · Uncontrolled with A1C at 9 8  · Blood glucose controlled on lantus 30 units daily  · Change to lantus 15 units bedtime, add 3 units TID with meals  · Continue insulin sliding scale

## 2022-11-05 NOTE — ASSESSMENT & PLAN NOTE
65year old female former smoker w/HTN, HLD, uncontrolled type II DM (A1c 9 8), hx CVA, presenting with nonhealing extensive L heel ulceration and R hallux and 5th digit ulceration  Vascular surgery consulted for input  S/p abdominal aortogram, LLE run off w/R ALLISON PTA/stent and LLE SFA shockwave angioplasty/FATOU 10/31/22 ([facility])    -Post intervention SHARIF 11/1/22: Rt: 0 51 (0 80) Lt: 0 74/85/37  -CTA abd/pelvis w/ run off 11/1/22: Flow-limiting dissection flap at the prior arteriotomy site in the R CFA superimposed upon moderate to severe atherosclerotic disease  Focal severe atherosclerotic narrowing at the adductor canal with additional moderate atherosclerotic narrowing throughout the remainder of the R SFA  -Vein mapping 11/2/22: b/l GSV >2mm in caliber from the groin to the distal calf     sCr/eGFR: 1 3/44    Recommendations:  -Bilateral tissue loss in the setting of uncontrolled type II DM and NYDIA on admission, now s/p angiogram w/intervention  Groin access is soft without hematoma or ecchymosis  -S/p angiogram 10/31/22 patient noted to be in acute respiratory distress and hypoxic, requiring 10L O2 nonrebreather to keep O2 sat >90  Felt to be secondary to fluid overload  Patient diuresed and continues to improve clinically and weaning O2 as tolerated  -D/w Dr Alvaro Glover, L heel ulceration stable without OM  R foot tissue loss likely will require surgical intervention  -Now s/p LLE angiogram w/ R ALLISON PTA/stent and L SFA shockwave angioplasty and FATOU  Two vessel run off to the LLE via AT/peroneal  PT chronically occluded   -Post intervention SHARIF obtained which did show improvement in LLE SHARIF however decrease in R SHARIF   Duplex obtained which did confirm elevated velocities of the R CFA- CTA obtained  -CTA reviewed which demonstrates flow limiting dissection of the R CFA likely secondary to closure device injury   -Patient will require surgical intervention to include R femoral endarterectomy and repair, with SFA intervention versus angioplasty  Angioplasty preferred given patient's high risk for surgery  Tentatively scheduled for OR 11/7/22  -Cardiology consulted, appreciated recs  Intermediate to high risk   Does not require further cardiac testing  -Vein mapping complete w/ adequate vein  -Medical management with ASA, statin, Plavix  -Continue local wound care per podiatry  -Medical management and glucose control per IGOR

## 2022-11-05 NOTE — PROGRESS NOTES
[facility] [facility]  [facility] [address] [facility] 1960, 65 y o  female MRN: [id number]  Unit/Bed#: Metsa 68 2 [address] 221-02 Encounter: [id number]  Primary Care Provider: ALEX Marie   Date and time admitted to hospital: 10/21/2022  7:58 PM    * Non healing left heel wound  Assessment & Plan  · In setting of uncontrolled diabetes and known PAD  · Arterial duplex reviewed, "Great toe pressure of 35 mm Hg, below healing threshold for a diabetic"  · MRI of the foot noted for "a large plantar lateral heel ulcer, without underlying soft tissue abscess  · Received Zosyn x7, no further abx per ID  · Status post arteriogram with angioplasty  · Continue aspirin, Plavix and statin  · Podiatry evaluated, recommending outpatient wound care, no surgical intervention  · IR arteriogram completed on 10/31 left  lower leg arteriogram with proximal and distal SFA shockwave balloon lithotripsy and stent placement, as well as right common iliac artery stent placement  · Repeat imaging show increased velocity with flow limiting dissection of right lower limb  · Heparin gtt  · Vascular planning for bypass surgery this Monday  · Cardiology evaluated for clearance    PAD (peripheral artery disease) ([facility] [address] )  Assessment & Plan  · Vascular surgery input appreciated  · Continue with Plavix, aspirin, statin therapy  · Status post arteriogram on 11/01 with angioplasty, with stent placement on left lower leg and right common iliac artery  · Plan for further surgery this monday        Acute on chronic anemia  Assessment & Plan  Hemoglobin fluctuating with baseline around 10  S/p 1 unit transfused, H/H improved  FOBT neg, no current source of bleeding, groin without hematoma    Wound of lower extremity  Assessment & Plan  MRI LLE without evidence of osteomyelitis, discontinue abx  Follow up with podiatry for continue wound check outpatient    Type 2 diabetes mellitus with hyperglycemia, with long-term current use of insulin [facility])  Assessment & Plan  Lab Results   Component Value Date    HGBA1C 9 8 (H) 10/25/2022     Recent Labs     22  1540 22  2116 22  0736 22  1111   POCGLU 154* 211* 130 175*     · Uncontrolled with A1C at 9 8  · Blood glucose controlled on lantus 30 units daily  · Change to lantus 15 units bedtime, add 3 units TID with meals  · Continue insulin sliding scale    Benign essential hypertension  Assessment & Plan  Continue current regimen, BP currently controlled  Holding lisinopril, resume when able    Depression, recurrent (HCC)  Assessment & Plan  Continue sertraline, Xanax PRN        VTE Pharmacologic Prophylaxis:   Pharmacologic: Heparin Drip  Mechanical VTE Prophylaxis in Place: Yes    Patient Centered Rounds: I have performed bedside rounds with nursing staff today  Discussions with Specialists or Other Care Team Provider: Vascular    Education and Discussions with Family / Patient: patient    Time Spent for Care: 30 minutes  More than 50% of total time spent on counseling and coordination of care as described above  Current Length of Stay: 14 day(s)    Current Patient Status: Inpatient   Certification Statement: The patient will continue to require additional inpatient hospital stay due to awaiting surgery monday    Discharge Plan: pending    Code Status: Level 1 - Full Code      Subjective:   No events overnight  Slept well, tolerating diet  Objective:     Vitals:   Temp (24hrs), Av 1 °F (36 7 °C), Min:97 6 °F (36 4 °C), Max:98 9 °F (37 2 °C)    Temp:  [97 6 °F (36 4 °C)-98 9 °F (37 2 °C)] 97 8 °F (36 6 °C)  HR:  [73-77] 74  Resp:  [15-21] 21  BP: (127-159)/(68-86) 127/68  SpO2:  [92 %-97 %] 96 %  Body mass index is 42 26 kg/m²  Input and Output Summary (last 24 hours):     No intake or output data in the 24 hours ending 22 1117    Physical Exam:     Physical Exam  Vitals reviewed  Constitutional:       General: She is not in acute distress  Appearance: Normal appearance  HENT:      Head: Atraumatic  Cardiovascular:      Rate and Rhythm: Regular rhythm  Heart sounds: Normal heart sounds  Pulmonary:      Effort: Pulmonary effort is normal  No respiratory distress  Abdominal:      General: Bowel sounds are normal  There is no distension  Palpations: Abdomen is soft  Musculoskeletal:         General: No swelling  Right lower leg: No edema  Left lower leg: No edema  Skin:     General: Skin is warm  Neurological:      Mental Status: She is alert and oriented to person, place, and time  Motor: No weakness  Psychiatric:         Mood and Affect: Mood normal        Additional Data:     Labs:    Results from last 7 days   Lab Units 11/05/22  0541   WBC Thousand/uL 9 62   HEMOGLOBIN g/dL 8 0*   HEMATOCRIT % 25 8*   PLATELETS Thousands/uL 277   NEUTROS PCT % 72   LYMPHS PCT % 13*   MONOS PCT % 7   EOS PCT % 7*     Results from last 7 days   Lab Units 11/05/22  0541   SODIUM mmol/L 138   POTASSIUM mmol/L 5 0   CHLORIDE mmol/L 100   CO2 mmol/L 31   BUN mg/dL 18   CREATININE mg/dL 1 37*   ANION GAP mmol/L 7   CALCIUM mg/dL 8 5   GLUCOSE RANDOM mg/dL 139     Results from last 7 days   Lab Units 11/01/22  2327   INR  1 14     Results from last 7 days   Lab Units 11/05/22  1111 11/05/22  0736 11/04/22  2116 11/04/22  1540 11/04/22  1049 11/04/22  0757 11/03/22  2048 11/03/22  1611 11/03/22  1108 11/03/22  0757 11/02/22  2034 11/02/22  1621   POC GLUCOSE mg/dl 175* 130 211* 154* 174* 137 149* 91 82 74 163* 136                   * I Have Reviewed All Lab Data Listed Above  * Additional Pertinent Lab Tests Reviewed: All Labs For Current Hospital Admission Reviewed    Imaging:    XR chest portable    Result Date: 10/27/2022  Impression: Mild pulmonary vascular congestion  Workstation performed: [id number]     XR chest 1 view portable    Result Date: 10/22/2022  Impression: No acute cardiopulmonary disease   Workstation performed: [id number]     XR foot 3+ views RIGHT    Result Date: 10/22/2022  Impression: No acute osseous abnormality  Workstation performed: [id number]     CT lower extremity w contrast left    Result Date: 10/22/2022  Impression: No acute bony process is seen  Moderate superficial soft tissue edema in the foot, ankle, and leg, superimposed cellulitis considered in the appropriate clinical setting  Correlation with patient's symptoms and laboratory values recommended  No discrete drainable collection identified  Other findings as above   Workstation performed: [id number]       Recent Cultures (last 7 days):           Last 24 Hours Medication List:   Current Facility-Administered Medications   Medication Dose Route Frequency Provider Last Rate   • acetaminophen  650 mg Oral Q6H PRN Sugey Wiseman PA-C     • albuterol  2 puff Inhalation Q4H PRN Amy Parry MD     • amLODIPine  10 mg Oral Daily AnishALEX Sherman     • aspirin  81 mg Oral Daily Sugey Wiseman PA-C     • atorvastatin  40 mg Oral Daily With Tommy Aiken MD     • carvedilol  25 mg Oral BID With Meals ALEX Amanda     • clopidogrel  75 mg Oral Daily Maxim Ricardo PA-C     • furosemide  40 mg Oral Daily Karthik Crocker MD     • gabapentin  200 mg Oral BID Sugey Wiseman PA-C     • heparin (porcine)  3-30 Units/kg/hr (Order-Specific) Intravenous Titrated Kennith Aase, DO 21 Units/kg/hr (11/05/22 1188)   • heparin (porcine)  4,400 Units Intravenous Q1H PRN Kennith Aase, DO     • heparin (porcine)  8,800 Units Intravenous Q1H PRN Kennith Aase, DO     • hydrALAZINE  10 mg Intravenous Q6H PRN Sugey Wiseman PA-C     • insulin glargine  15 Units Subcutaneous HS Karthik Crocker MD     • insulin lispro  1-6 Units Subcutaneous 4x Daily (AC & HS) Sugey Wiseman PA-C     • insulin lispro  3 Units Subcutaneous TID With Meals Karthik Crocker MD     • LORazepam  0 5 mg Oral Q8H PRN Amy Peterson Lauren Green MD     • metoclopramide  5 mg Intravenous Q6H PRN Dorsie Quiet, DO     • morphine injection  4 mg Intravenous Q4H PRN Dorsie Quiet, DO     • nystatin   Topical BID Vanessa Howard MD     • ondansetron  4 mg Intravenous Q6H PRN Sugey Wiseman PA-C     • oxyCODONE  5 mg Oral Q4H PRN Amy Parry MD      Or   • oxyCODONE  10 mg Oral Q4H PRN Catrina Kovacs MD     • sertraline  100 mg Oral Daily Sugey Tamez PA-C          Today, Patient Was Seen By: Vanessa Howard    ** Please Note: Dictation voice to text software may have been used in the creation of this document   **

## 2022-11-05 NOTE — ASSESSMENT & PLAN NOTE
Hemoglobin fluctuating with baseline around 10  S/p 1 unit transfused, H/H improved  FOBT neg, no current source of bleeding, groin without hematoma

## 2022-11-05 NOTE — ASSESSMENT & PLAN NOTE
65year old female former smoker w/HTN, HLD, uncontrolled type II DM (A1c 9 8), hx CVA, presenting with nonhealing extensive L heel ulceration and R hallux and 5th digit ulceration  Vascular surgery consulted for input  S/p abdominal aortogram, LLE run off w/R ALLISON PTA/stent and LLE SFA shockwave angioplasty/FATOU 10/31/22 ([facility])    -Post intervention SHARIF 11/1/22: Rt: 0 51 (0 80) Lt: 0 74/85/37  -CTA abd/pelvis w/ run off 11/1/22: Flow-limiting dissection flap at the prior arteriotomy site in the R CFA superimposed upon moderate to severe atherosclerotic disease  Focal severe atherosclerotic narrowing at the adductor canal with additional moderate atherosclerotic narrowing throughout the remainder of the R SFA  -Vein mapping 11/2/22: b/l GSV >2mm in caliber from the groin to the distal calf     sCr/eGFR: 1 3/44  Hgb: 8 4 (6 8 s/p PRBC)    Recommendations:  -Bilateral tissue loss in the setting of uncontrolled type II DM and NYDIA on admission, now s/p angiogram w/intervention  Groin access is soft without hematoma or ecchymosis  -S/p angiogram 10/31/22 patient noted to be in acute respiratory distress and hypoxic, requiring 10L O2 nonrebreather to keep O2 sat >90  Felt to be secondary to fluid overload  Patient diuresed and continues to improve clinically and weaning O2 as tolerated  -D/w Dr Chloe Zavaal, L heel ulceration stable without OM  R foot tissue loss likely will require surgical intervention  -Now s/p LLE angiogram w/ R ALLISON PTA/stent and L SFA shockwave angioplasty and FATOU  Two vessel run off to the LLE via AT/peroneal  PT chronically occluded   -Post intervention SHARIF obtained which did show improvement in LLE SHARIF however decrease in R SHARIF   Duplex obtained which did confirm elevated velocities of the R CFA- CTA obtained  -CTA reviewed which demonstrates flow limiting dissection of the R CFA likely secondary to closure device injury   -Patient will require surgical intervention to include R femoral endarterectomy and repair, with SFA intervention  Tentatively scheduled for OR 11/7/22  -Cardiology consulted, appreciated recs  Intermediate to high risk   Does not require further cardiac testing  -Vein mapping complete w/ adequate vein  -Medical management with ASA, statin, Plavix  -Continue local wound care per podiatry  -Medical management and glucose control per [facility]

## 2022-11-05 NOTE — PROGRESS NOTES
[address] [facility]  Progress Note - [address] 1960, 65 y o  female MRN: [id number]  Unit/Bed#: Metsa 68 2 [address] 221-02 Encounter: [id number]  Primary Care Provider: ALEX Son   Date and time admitted to hospital: 10/21/2022  7:58 PM    PAD (peripheral artery disease) [facility])  Assessment & Plan  65year old female former smoker w/HTN, HLD, uncontrolled type II DM (A1c 9 8), hx CVA, presenting with nonhealing extensive L heel ulceration and R hallux and 5th digit ulceration  Vascular surgery consulted for input  S/p abdominal aortogram, LLE run off w/R ALLISON PTA/stent and LLE SFA shockwave angioplasty/FATOU 10/31/22 ([facility])    -Post intervention SHARIF 11/1/22: Rt: 0 51 (0 80) Lt: 0 74/85/37  -CTA abd/pelvis w/ run off 11/1/22: Flow-limiting dissection flap at the prior arteriotomy site in the R CFA superimposed upon moderate to severe atherosclerotic disease  Focal severe atherosclerotic narrowing at the adductor canal with additional moderate atherosclerotic narrowing throughout the remainder of the R SFA  -Vein mapping 11/2/22: b/l GSV >2mm in caliber from the groin to the distal calf     sCr/eGFR: 1 3/44  Hgb: 8 4 (6 8 s/p PRBC)    Recommendations:  -Bilateral tissue loss in the setting of uncontrolled type II DM and NYDIA on admission, now s/p angiogram w/intervention  Groin access is soft without hematoma or ecchymosis  -S/p angiogram 10/31/22 patient noted to be in acute respiratory distress and hypoxic, requiring 10L O2 nonrebreather to keep O2 sat >90  Felt to be secondary to fluid overload  Patient diuresed and continues to improve clinically and weaning O2 as tolerated  -D/w Dr Duke Bowling L heel ulceration stable without OM  R foot tissue loss likely will require surgical intervention  -Now s/p LLE angiogram w/ R ALLISON PTA/stent and L SFA shockwave angioplasty and FATOU   Two vessel run off to the LLE via AT/peroneal  PT chronically occluded   -Post intervention SHARIF obtained which did show improvement in LLE SHARIF however decrease in R SHARIF  Duplex obtained which did confirm elevated velocities of the R CFA- CTA obtained  -CTA reviewed which demonstrates flow limiting dissection of the R CFA likely secondary to closure device injury   -Patient will require surgical intervention to include R femoral endarterectomy and repair, with SFA intervention  Tentatively scheduled for OR 11/7/22  -Cardiology consulted, appreciated recs  Intermediate to high risk  Does not require further cardiac testing  -Vein mapping complete w/ adequate vein  -Medical management with ASA, statin, Plavix  -Continue local wound care per podiatry  -Medical management and glucose control per SLIM          Subjective/Objective     Subjective:  No acute overnight events  Objective:   Vitals: Blood pressure 127/68, pulse 74, temperature 97 6 °F (36 4 °C), temperature source Temporal, resp  rate 21, height 5' 3 75" (1 619 m), weight 111 kg (244 lb 4 3 oz), SpO2 96 %, not currently breastfeeding  ,Body mass index is 42 26 kg/m²  I/O       11/03 0701 11/04 0700 11/04 0701 11/05 0700 11/05 0701 11/06 0700    Blood 350      Total Intake(mL/kg) 350 (3 2)      Urine (mL/kg/hr) 400 (0 2)      Total Output 400      Net -50             Unmeasured Urine Occurrence 1 x            Physical Exam:  Gen: NAD, Aox3, Comfortable in bed  Chest: Normal work of breathing, no respiratory distress  Abd: Soft, ND, NT  Ext:  Left dry necrotic medial distal calf wounds with left heel pain  Left foot is wrapped  Right 5th toe is necrotic with dorsal foot erythema minimally tender  Pulses:  Left palpate DP and PT signals  Right DP PT signals  Skin: Warm, Dry, Intact      Lab, Imaging and other studies:   I have personally reviewed pertinent reports    , CBC with diff:   Lab Results   Component Value Date    WBC 9 62 11/05/2022    HGB 8 0 (L) 11/05/2022    HCT 25 8 (L) 11/05/2022    MCV 91 11/05/2022     11/05/2022    MCH 28 3 11/05/2022    MCHC 31 0 (L) 11/05/2022    RDW 15 0 11/05/2022    MPV 9 1 11/05/2022    NRBC 0 11/05/2022   , BMP/CMP:   Lab Results   Component Value Date    SODIUM 138 11/05/2022    K 5 0 11/05/2022     11/05/2022    CO2 31 11/05/2022    BUN 18 11/05/2022    CREATININE 1 37 (H) 11/05/2022    CALCIUM 8 5 11/05/2022    EGFR 41 11/05/2022   , Magnesium: No components found for: MAG  VTE Pharmacologic Prophylaxis: Heparin  VTE Mechanical Prophylaxis: sequential compression device        Miracle Vance  11/5/2022  7:39 AM

## 2022-11-06 LAB
ANION GAP SERPL CALCULATED.3IONS-SCNC: 9 MMOL/L (ref 4–13)
APTT PPP: 79 SECONDS (ref 23–37)
BASOPHILS # BLD AUTO: 0.05 THOUSANDS/ÂΜL (ref 0–0.1)
BASOPHILS NFR BLD AUTO: 1 % (ref 0–1)
BUN SERPL-MCNC: 19 MG/DL (ref 5–25)
CALCIUM SERPL-MCNC: 8.6 MG/DL (ref 8.3–10.1)
CHLORIDE SERPL-SCNC: 99 MMOL/L (ref 96–108)
CO2 SERPL-SCNC: 30 MMOL/L (ref 21–32)
CREAT SERPL-MCNC: 1.37 MG/DL (ref 0.6–1.3)
EOSINOPHIL # BLD AUTO: 0.63 THOUSAND/ÂΜL (ref 0–0.61)
EOSINOPHIL NFR BLD AUTO: 7 % (ref 0–6)
ERYTHROCYTE [DISTWIDTH] IN BLOOD BY AUTOMATED COUNT: 15 % (ref 11.6–15.1)
GFR SERPL CREATININE-BSD FRML MDRD: 41 ML/MIN/1.73SQ M
GLUCOSE SERPL-MCNC: 135 MG/DL (ref 65–140)
GLUCOSE SERPL-MCNC: 150 MG/DL (ref 65–140)
GLUCOSE SERPL-MCNC: 153 MG/DL (ref 65–140)
GLUCOSE SERPL-MCNC: 167 MG/DL (ref 65–140)
GLUCOSE SERPL-MCNC: 213 MG/DL (ref 65–140)
HCT VFR BLD AUTO: 25.7 % (ref 34.8–46.1)
HGB BLD-MCNC: 7.8 G/DL (ref 11.5–15.4)
IMM GRANULOCYTES # BLD AUTO: 0.07 THOUSAND/UL (ref 0–0.2)
IMM GRANULOCYTES NFR BLD AUTO: 1 % (ref 0–2)
LYMPHOCYTES # BLD AUTO: 1.23 THOUSANDS/ÂΜL (ref 0.6–4.47)
LYMPHOCYTES NFR BLD AUTO: 14 % (ref 14–44)
MCH RBC QN AUTO: 28 PG (ref 26.8–34.3)
MCHC RBC AUTO-ENTMCNC: 30.4 G/DL (ref 31.4–37.4)
MCV RBC AUTO: 92 FL (ref 82–98)
MONOCYTES # BLD AUTO: 0.68 THOUSAND/ÂΜL (ref 0.17–1.22)
MONOCYTES NFR BLD AUTO: 8 % (ref 4–12)
NEUTROPHILS # BLD AUTO: 6.21 THOUSANDS/ÂΜL (ref 1.85–7.62)
NEUTS SEG NFR BLD AUTO: 69 % (ref 43–75)
NRBC BLD AUTO-RTO: 0 /100 WBCS
PLATELET # BLD AUTO: 266 THOUSANDS/UL (ref 149–390)
PMV BLD AUTO: 9.1 FL (ref 8.9–12.7)
POTASSIUM SERPL-SCNC: 3.8 MMOL/L (ref 3.5–5.3)
RBC # BLD AUTO: 2.79 MILLION/UL (ref 3.81–5.12)
SODIUM SERPL-SCNC: 138 MMOL/L (ref 135–147)
WBC # BLD AUTO: 8.87 THOUSAND/UL (ref 4.31–10.16)

## 2022-11-06 PROCEDURE — 99232 SBSQ HOSP IP/OBS MODERATE 35: CPT | Performed by: STUDENT IN AN ORGANIZED HEALTH CARE EDUCATION/TRAINING PROGRAM

## 2022-11-06 PROCEDURE — NC001 PR NO CHARGE: Performed by: SURGERY

## 2022-11-06 PROCEDURE — 80048 BASIC METABOLIC PNL TOTAL CA: CPT | Performed by: STUDENT IN AN ORGANIZED HEALTH CARE EDUCATION/TRAINING PROGRAM

## 2022-11-06 PROCEDURE — 82948 REAGENT STRIP/BLOOD GLUCOSE: CPT

## 2022-11-06 PROCEDURE — 85025 COMPLETE CBC W/AUTO DIFF WBC: CPT | Performed by: STUDENT IN AN ORGANIZED HEALTH CARE EDUCATION/TRAINING PROGRAM

## 2022-11-06 PROCEDURE — 99232 SBSQ HOSP IP/OBS MODERATE 35: CPT | Performed by: INTERNAL MEDICINE

## 2022-11-06 PROCEDURE — 85730 THROMBOPLASTIN TIME PARTIAL: CPT | Performed by: STUDENT IN AN ORGANIZED HEALTH CARE EDUCATION/TRAINING PROGRAM

## 2022-11-06 RX ORDER — PANTOPRAZOLE SODIUM 40 MG/1
40 TABLET, DELAYED RELEASE ORAL
Status: DISCONTINUED | OUTPATIENT
Start: 2022-11-06 | End: 2023-01-12 | Stop reason: HOSPADM

## 2022-11-06 RX ORDER — SODIUM CHLORIDE 9 MG/ML
75 INJECTION, SOLUTION INTRAVENOUS CONTINUOUS
Status: DISCONTINUED | OUTPATIENT
Start: 2022-11-07 | End: 2022-11-08

## 2022-11-06 RX ADMIN — INSULIN LISPRO 2 UNITS: 100 INJECTION, SOLUTION INTRAVENOUS; SUBCUTANEOUS at 21:51

## 2022-11-06 RX ADMIN — CLOPIDOGREL BISULFATE 75 MG: 75 TABLET ORAL at 08:58

## 2022-11-06 RX ADMIN — HEPARIN SODIUM 21 UNITS/KG/HR: 10000 INJECTION, SOLUTION INTRAVENOUS at 13:37

## 2022-11-06 RX ADMIN — SERTRALINE HYDROCHLORIDE 100 MG: 100 TABLET ORAL at 08:58

## 2022-11-06 RX ADMIN — LORAZEPAM 0.5 MG: 0.5 TABLET ORAL at 05:59

## 2022-11-06 RX ADMIN — INSULIN LISPRO 3 UNITS: 100 INJECTION, SOLUTION INTRAVENOUS; SUBCUTANEOUS at 09:00

## 2022-11-06 RX ADMIN — ATORVASTATIN CALCIUM 40 MG: 40 TABLET, FILM COATED ORAL at 17:05

## 2022-11-06 RX ADMIN — NYSTATIN: 100000 POWDER TOPICAL at 09:00

## 2022-11-06 RX ADMIN — INSULIN LISPRO 1 UNITS: 100 INJECTION, SOLUTION INTRAVENOUS; SUBCUTANEOUS at 08:59

## 2022-11-06 RX ADMIN — ASPIRIN 81 MG CHEWABLE TABLET 81 MG: 81 TABLET CHEWABLE at 08:58

## 2022-11-06 RX ADMIN — INSULIN GLARGINE 15 UNITS: 100 INJECTION, SOLUTION SUBCUTANEOUS at 21:52

## 2022-11-06 RX ADMIN — LORAZEPAM 0.5 MG: 0.5 TABLET ORAL at 13:40

## 2022-11-06 RX ADMIN — GABAPENTIN 200 MG: 100 CAPSULE ORAL at 08:58

## 2022-11-06 RX ADMIN — OXYCODONE HYDROCHLORIDE 10 MG: 10 TABLET ORAL at 11:58

## 2022-11-06 RX ADMIN — INSULIN LISPRO 3 UNITS: 100 INJECTION, SOLUTION INTRAVENOUS; SUBCUTANEOUS at 12:34

## 2022-11-06 RX ADMIN — CARVEDILOL 25 MG: 25 TABLET, FILM COATED ORAL at 08:59

## 2022-11-06 RX ADMIN — AMLODIPINE BESYLATE 10 MG: 10 TABLET ORAL at 08:58

## 2022-11-06 RX ADMIN — FUROSEMIDE 40 MG: 40 TABLET ORAL at 08:58

## 2022-11-06 RX ADMIN — HEPARIN SODIUM 21 UNITS/KG/HR: 10000 INJECTION, SOLUTION INTRAVENOUS at 01:50

## 2022-11-06 RX ADMIN — CARVEDILOL 25 MG: 25 TABLET, FILM COATED ORAL at 17:05

## 2022-11-06 RX ADMIN — PANTOPRAZOLE SODIUM 40 MG: 40 TABLET, DELAYED RELEASE ORAL at 08:58

## 2022-11-06 RX ADMIN — GABAPENTIN 200 MG: 100 CAPSULE ORAL at 17:05

## 2022-11-06 RX ADMIN — OXYCODONE HYDROCHLORIDE 10 MG: 10 TABLET ORAL at 17:07

## 2022-11-06 RX ADMIN — OXYCODONE HYDROCHLORIDE 10 MG: 10 TABLET ORAL at 01:18

## 2022-11-06 RX ADMIN — INSULIN LISPRO 3 UNITS: 100 INJECTION, SOLUTION INTRAVENOUS; SUBCUTANEOUS at 17:08

## 2022-11-06 RX ADMIN — NYSTATIN: 100000 POWDER TOPICAL at 17:06

## 2022-11-06 NOTE — PROGRESS NOTES
[address] [facility]  Progress Note - Oneda Life 1960, 65 y o  female MRN: [id number]  Unit/Bed#: Mets[address] 68 2 [address] 221-02 Encounter: [id number]  Primary Care Provider: ALEX Gamble   Date and time admitted to hospital: 10/21/2022  7:58 PM    PAD (peripheral artery disease) [facility])  Assessment & Plan  65year old female former smoker w/HTN, HLD, uncontrolled type II DM (A1c 9 8), hx CVA, presenting with nonhealing extensive L heel ulceration and R hallux and 5th digit ulceration  Vascular surgery consulted for input  S/p abdominal aortogram, LLE run off w/R ALLISON PTA/stent and LLE SFA shockwave angioplasty/FATOU 10/31/22 ([facility])    -Post intervention SHARIF 11/1/22: Rt: 0 51 (0 80) Lt: 0 74/85/37  -CTA abd/pelvis w/ run off 11/1/22: Flow-limiting dissection flap at the prior arteriotomy site in the R CFA superimposed upon moderate to severe atherosclerotic disease  Focal severe atherosclerotic narrowing at the adductor canal with additional moderate atherosclerotic narrowing throughout the remainder of the R SFA  -Vein mapping 11/2/22: b/l GSV >2mm in caliber from the groin to the distal calf     sCr/eGFR: 1 3/44    Recommendations:  -Bilateral tissue loss in the setting of uncontrolled type II DM and NYDIA on admission, now s/p angiogram w/intervention  Groin access is soft without hematoma or ecchymosis  -S/p angiogram 10/31/22 patient noted to be in acute respiratory distress and hypoxic, requiring 10L O2 nonrebreather to keep O2 sat >90  Felt to be secondary to fluid overload  Patient diuresed and continues to improve clinically and weaning O2 as tolerated  -D/w Dr Jackeline Guerra, L heel ulceration stable without OM  R foot tissue loss likely will require surgical intervention  -Now s/p LLE angiogram w/ R ALLISON PTA/stent and L SFA shockwave angioplasty and FATOU   Two vessel run off to the LLE via AT/peroneal  PT chronically occluded   -Post intervention SHARIF obtained which did show improvement in LLE SHARIF however decrease in R SHARIF  Duplex obtained which did confirm elevated velocities of the R CFA- CTA obtained  -CTA reviewed which demonstrates flow limiting dissection of the R CFA likely secondary to closure device injury   -Patient will require surgical intervention to include R femoral endarterectomy and repair, with SFA intervention versus angioplasty  Angioplasty preferred given patient's high risk for surgery  Tentatively scheduled for OR 11/7/22  -Cardiology consulted, appreciated recs  Intermediate to high risk  Does not require further cardiac testing  -Vein mapping complete w/ adequate vein  -Medical management with ASA, statin, Plavix  -Continue local wound care per podiatry  -Medical management and glucose control per [facility]IM                  Subjective/Objective     Subjective: No acute events overnight  Patient sitting up in bed this morning, still with supplemental oxygen requirement  Does not have questions regarding yesterday's discussion about tomorrow's plan  Objective:     Blood pressure 142/61, pulse 73, temperature 97 9 °F (36 6 °C), temperature source Oral, resp  rate 18, height 5' 3 75" (1 619 m), weight 112 kg (247 lb 2 2 oz), SpO2 97 %, not currently breastfeeding  ,Body mass index is 42 26 kg/m²        Intake/Output Summary (Last 24 hours) at 11/5/2022 2036  Last data filed at 11/5/2022 1500  Gross per 24 hour   Intake 720 ml   Output --   Net 720 ml       Invasive Devices  Report    Peripheral Intravenous Line  Duration           Peripheral IV 11/03/22 Proximal;Right;Ventral (anterior) Forearm 2 days    Peripheral IV 11/05/22 Left;Proximal;Ventral (anterior) Forearm 1 day          Drain  Duration           External Urinary Catheter 1 day                Physical Exam:  General: No acute distress  Neuro: alert and oriented  HEENT: moist mucous membranes  CV: Well perfused, regular rate and rhythm  Lungs: Normal work of breathing, no increased respiratory effort  Abdomen: Soft, non-tender, non-distended  Incision(s) clean, dry and intact  Extremities: No edema, clubbing or cyanosis   Right dorsal foot minimally tender to palpation, pain in the left heel, left foot with clean dressing  Skin: Warm, dry

## 2022-11-06 NOTE — ASSESSMENT & PLAN NOTE
· In setting of uncontrolled diabetes and known PAD  · MRI of the foot noted for "a large plantar lateral heel ulcer, without underlying soft tissue abscess  · Received Zosyn, no further abx per ID  · Continue aspirin, Plavix and statin  · Podiatry evaluated, recommending outpatient wound care, no surgical intervention  · IR arteriogram completed on 10/31 left  lower leg arteriogram with proximal and distal SFA shockwave balloon lithotripsy and stent placement, as well as right common iliac artery stent placement  · Repeat imaging show increased velocity with flow limiting dissection of right lower limb  · Heparin gtt  · Vascular planning for bypass surgery this Monday  · Cardiology evaluated for clearance  · NPO midnight

## 2022-11-06 NOTE — ANESTHESIA PREPROCEDURE EVALUATION
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)        Physical Exam    Airway    Mallampati score: III  TM Distance: >3 FB  Neck ROM: full     Dental       Cardiovascular  Rhythm: regular, Rate: normal, Cardiovascular exam normal    Pulmonary  Pulmonary exam normal Breath sounds clear to auscultation,     Other Findings        Anesthesia Plan  ASA Score- 3     Anesthesia Type- general with ASA Monitors  Additional Monitors: arterial line  Airway Plan:           Plan Factors-Exercise tolerance (METS): <4 METS  Chart reviewed  Existing labs reviewed  Patient is not a current smoker  Obstructive sleep apnea risk education given perioperatively  Induction- intravenous  Postoperative Plan-     Informed Consent- Anesthetic plan and risks discussed with patient

## 2022-11-06 NOTE — PROGRESS NOTES
[address]  Progress Note - [facility] 1960, 65 y o  female MRN: [id number]  Unit/Bed#: [address] 221-02 Encounter: [id number]  Primary Care Provider: ALEX Reid   Date and time admitted to hospital: 10/21/2022  7:58 PM    * Non healing left heel wound  Assessment & Plan  · In setting of uncontrolled diabetes and known PAD  · MRI of the foot noted for "a large plantar lateral heel ulcer, without underlying soft tissue abscess  · Received Zosyn, no further abx per ID  · Continue aspirin, Plavix and statin  · Podiatry evaluated, recommending outpatient wound care, no surgical intervention  · IR arteriogram completed on 10/31 left  lower leg arteriogram with proximal and distal SFA shockwave balloon lithotripsy and stent placement, as well as right common iliac artery stent placement  · Repeat imaging show increased velocity with flow limiting dissection of right lower limb  · Heparin gtt  · Vascular planning for bypass surgery this Monday  · Cardiology evaluated for clearance  · NPO midnight    PAD (peripheral artery disease) ([facility] [address] )  Assessment & Plan  · Vascular surgery input appreciated  · Continue with Plavix, aspirin, statin therapy  · Status post arteriogram on 11/01 with angioplasty, with stent placement on left lower leg and right common iliac artery  · Plan for further surgery this monday        Shortness of breath  Assessment & Plan  Patient did have episode of shortness of breath, with increasing oxygen requirements  Suspected likely due volume overload, elevated proBNP, x-ray imaging showing edema  Treated with IV Lasix with good urine output and improvement O2 requirement  2D echo reviewed, normal ef with normal DD, no sig wall motion abn  Per nephrology, started on po lasix 40mg once daily      Acute on chronic anemia  Assessment & Plan  Hemoglobin fluctuating with baseline around 10  S/p 1 unit transfused, H/H improved  FOBT neg, no current source of bleeding, groin without hematoma  Continue to monitor H/H    Hypertensive urgency  Assessment & Plan    · Continue Coreg, blood pressure well controlled  · Continue lisinopril 40 mg and amlodipine 10 mg daily    Wound of lower extremity  Assessment & Plan  MRI LLE without evidence of osteomyelitis, discontinue abx  Follow up with podiatry for continue wound check outpatient    Type 2 diabetes mellitus with hyperglycemia, with long-term current use of insulin [facility])  Assessment & Plan  Lab Results   Component Value Date    HGBA1C 9 8 (H) 10/25/2022     Recent Labs     11/05/22  1111 11/05/22  1542 11/05/22  2106 11/06/22  0717   POCGLU 175* 204* 204* 153*     · Uncontrolled with A1C at 9 8  · Blood glucose controlled on lantus 30 units daily  · Change to lantus 15 units bedtime, add 3 units TID with meals  · Continue insulin sliding scale    Benign essential hypertension  Assessment & Plan  Continue current regimen, BP currently controlled  Holding lisinopril, resume when able    Depression, recurrent (HCC)  Assessment & Plan  Continue sertraline, Xanax PRN        VTE Pharmacologic Prophylaxis:   Pharmacologic: Heparin Drip  Mechanical VTE Prophylaxis in Place: Yes    Patient Centered Rounds: I have performed bedside rounds with nursing staff today  Discussions with Specialists or Other Care Team Provider: Vascular    Education and Discussions with Family / Patient: patient    Time Spent for Care: 30 minutes  More than 50% of total time spent on counseling and coordination of care as described above  Current Length of Stay: 15 day(s)    Current Patient Status: Inpatient   Certification Statement: The patient will continue to require additional inpatient hospital stay due to pending vascular surgery tmr    Discharge Plan: pending    Code Status: Level 1 - Full Code      Subjective:   No events overnight  No complaints  Reports some anxiety regarding procedure tomorrow      Objective:     Vitals:   Temp (24hrs), Av 7 °F (37 1 °C), Min:97 9 °F (36 6 °C), Max:99 4 °F (37 4 °C)    Temp:  [97 9 °F (36 6 °C)-99 4 °F (37 4 °C)] 97 9 °F (36 6 °C)  HR:  [73-74] 73  Resp:  [18-20] 18  BP: (112-152)/(61-68) 142/61  SpO2:  [89 %-97 %] 97 %  Body mass index is 42 75 kg/m²  Input and Output Summary (last 24 hours): Intake/Output Summary (Last 24 hours) at 2022 1042  Last data filed at 2022 0900  Gross per 24 hour   Intake 1140 ml   Output 675 ml   Net 465 ml       Physical Exam:     Physical Exam  Vitals reviewed  Constitutional:       General: She is not in acute distress  Appearance: Normal appearance  HENT:      Head: Atraumatic  Cardiovascular:      Rate and Rhythm: Regular rhythm  Heart sounds: Normal heart sounds  Pulmonary:      Effort: Pulmonary effort is normal  No respiratory distress  Abdominal:      General: Bowel sounds are normal  There is no distension  Palpations: Abdomen is soft  Musculoskeletal:         General: No swelling  Right lower leg: No edema  Left lower leg: No edema  Skin:     General: Skin is warm  Neurological:      Mental Status: She is alert and oriented to person, place, and time  Motor: No weakness     Psychiatric:         Mood and Affect: Mood normal        Additional Data:     Labs:    Results from last 7 days   Lab Units 22  0458   WBC Thousand/uL 8 87   HEMOGLOBIN g/dL 7 8*   HEMATOCRIT % 25 7*   PLATELETS Thousands/uL 266   NEUTROS PCT % 69   LYMPHS PCT % 14   MONOS PCT % 8   EOS PCT % 7*     Results from last 7 days   Lab Units 22  0458   SODIUM mmol/L 138   POTASSIUM mmol/L 3 8   CHLORIDE mmol/L 99   CO2 mmol/L 30   BUN mg/dL 19   CREATININE mg/dL 1 37*   ANION GAP mmol/L 9   CALCIUM mg/dL 8 6   GLUCOSE RANDOM mg/dL 167*     Results from last 7 days   Lab Units 22  2327   INR  1 14     Results from last 7 days   Lab Units 22  0717 22  2106 22  1542 22  1111 22  0736 22  2116 22  1540 22  1049 22  0757 22  2048 22  1611 22  1108   POC GLUCOSE mg/dl 153* 204* 204* 175* 130 211* 154* 174* 137 149* 91 82                   * I Have Reviewed All Lab Data Listed Above  * Additional Pertinent Lab Tests Reviewed: All Labs For Current Hospital Admission Reviewed    Imaging:    XR chest portable    Result Date: 10/27/2022  Impression: Mild pulmonary vascular congestion  Workstation performed: [id number]     XR chest 1 view portable    Result Date: 10/22/2022  Impression: No acute cardiopulmonary disease  Workstation performed: [id number]     XR foot 3+ views RIGHT    Result Date: 10/22/2022  Impression: No acute osseous abnormality  Workstation performed: [id number]     CT lower extremity w contrast left    Result Date: 10/22/2022  Impression: No acute bony process is seen  Moderate superficial soft tissue edema in the foot, ankle, and leg, superimposed cellulitis considered in the appropriate clinical setting  Correlation with patient's symptoms and laboratory values recommended  No discrete drainable collection identified  Other findings as above   Workstation performed: [id number]       Recent Cultures (last 7 days):           Last 24 Hours Medication List:   Current Facility-Administered Medications   Medication Dose Route Frequency Provider Last Rate   • acetaminophen  650 mg Oral Q6H PRN Sugey Wiseman PA-C     • albuterol  2 puff Inhalation Q4H PRN Amy Parry MD     • amLODIPine  10 mg Oral Daily BEN ArthurNP     • aspirin  81 mg Oral Daily Sugey Wiseman PA-C     • atorvastatin  40 mg Oral Daily With Lilliana Harry MD     • carvedilol  25 mg Oral BID With Meals ALEX Arthur     • clopidogrel  75 mg Oral Daily Jennifer Goodwin PA-C     • gabapentin  200 mg Oral BID Sugey Wiseman PA-C     • heparin (porcine)  3-30 Units/kg/hr (Order-Specific) Intravenous Titrated Tamra Marion, DO 21 Units/kg/hr (11/06/22 0272)   • heparin (porcine)  4,400 Units Intravenous Q1H PRN Mariajose Obrien DO     • heparin (porcine)  8,800 Units Intravenous Q1H PRN Mariajose Obrien DO     • hydrALAZINE  10 mg Intravenous Q6H PRN Sugey Wiseman PA-C     • insulin glargine  15 Units Subcutaneous HS Ivis Piper MD     • insulin lispro  1-6 Units Subcutaneous 4x Daily (AC & HS) Sugey Wiseman PA-C     • insulin lispro  3 Units Subcutaneous TID With Meals Ivis Piper MD     • LORazepam  0 5 mg Oral Q8H PRN Amy Coffey MD     • metoclopramide  5 mg Intravenous Q6H PRN Daphney Angelucci, DO     • morphine injection  4 mg Intravenous Q4H PRN Daphney Angelucci, DO     • nystatin   Topical BID Ivis Piper MD     • ondansetron  4 mg Intravenous Q6H PRN Sugey Wiseman PA-C     • oxyCODONE  5 mg Oral Q4H PRN Amy Parry MD      Or   • oxyCODONE  10 mg Oral Q4H PRN Amy Parry MD     • pantoprazole  40 mg Oral Early Morning Ivis Piper MD     • sertraline  100 mg Oral Daily Sugey Hayden PA-C          Today, Patient Was Seen By: Ivis Piper    ** Please Note: Dictation voice to text software may have been used in the creation of this document   **

## 2022-11-06 NOTE — ASSESSMENT & PLAN NOTE
Hemoglobin fluctuating with baseline around 10  S/p 1 unit transfused, H/H improved  FOBT neg, no current source of bleeding, groin without hematoma  Continue to monitor H/H

## 2022-11-06 NOTE — PROGRESS NOTES
NEPHROLOGY PROGRESS NOTE   Luda Ulloa 65 y o  female MRN: [id number]  Unit/Bed#: [address] 221-02 Encounter: [id number]  Reason for Consult: NYDIA    71-year-old female with DM 2, HTN, PAD presenting with left foot wound  Nephrology consulted for NYDIA and need for CTA      ASSESSMENT/PLAN:  NYDIA:  Suspect septic ATN vs CAN with auto regulatory failure with ACEi  -1st episode creatinine peaked at 1 44 and went down to 1 1  Patient had 2nd episode with creatinine rise to 1 39 on 10/30/2022  -Peak creatinine 1 44 on 10/24/2022  -creatinine stable at 1 37 today  -baseline creatinine 0 7-1 1 since 2020  -s/p Agram 10/31 and CTA 11/1  -Imaging:  CT 2/20/2022 demonstrates 1 or more simple renal cysts but no hydronephrosis  -volume status hypervolemic  -nonoliguric    -Plan:  · Renal function stable  · Clinically, no acute indication for renal replacement therapy (dialysis)  · Strict I/Os, daily weights  · Continue diuretics today for fluid overload  Hold after a m  Dose today in anticipation of OR tomorrow  · Avoid nephrotoxins, NSAIDs, IV contrast if possible  · Avoid hypotension  Maintain MAP >65  · Trend BMP  · Adjust meds to appropriate GFR  · Optimize hemodynamic status to avoid delay in renal recovery  · Okay to proceed with planned R CFA endarterectomy, possible SFA intervention tomorrow  Will need gentle IVF pre and postprocedure  IVF per vascular surgery  Hold diuretics after am dose today      Hypertension/Volume status:  -originally presented with hypertensive urgency , resolved   -BP acceptable  -volume status hypervolemic  -Home medications:  Amlodipine 10 mg daily, lisinopril 40 mg daily, Toprol XL 25 mg daily  -Current medications:  Amlodipine 10 mg daily, carvedilol 25 mg b i d , Lasix 40 mg daily (d/c'd after am dose today)  -continue to hold lisinopril  -continue diuretics in the setting of fluid overload    Holding diuretics on Monday prior to surgery but will likely need to be resumed soon after OR  -Optimize hemodynamic status to avoid delay in renal recovery  -recommend hold parameters on antihypertensive's for SBP <130 mmHg  -Avoid hypotension or fluctuations in blood pressure  Maintain MAP >65  -low sodium (2 gm) diet  -continue to trend     Anemia:  -Hgb 7 8, trending down  Goal >10  -s/p transfusion 11/3/2022  -Iron studies:  Low iron and iron saturation  -avoid IV Venofer in the setting of infection  -continue to trend  -Transfuse for Hgb <7 0  -management per primary team     PAD with nonhealing left foot wound:  -bilateral foot wounds, L>R  -s/p R ALLISON PTA/stent and L SFA shockwave angioplasty/FATOU 10/31/2022 by IR c/b R CFA access site issue  -plan for R CFA endarterectomy, possible SFA intervention tomorrow, 11/7/2022  See recommendations above  -MRI with no OM  -continue antibiotics per primary team  -continue local wound care  -management per vascular surgery, Podiatry and primary team     UTI:  -cultures positive for E coli  -leukocytosis resolved  -currently on antibiotics    SUBJECTIVE:  Patient without complaints  No dyspnea  O2 presently while eating  Creatinine stable  Adequate urine output  Remains edematous  VSS    A complete review of systems was performed  Pertinent positives and negatives noted in the HPI  Otherwise the review of systems was negative  OBJECTIVE:  Current Weight: Weight - Scale: 112 kg (247 lb 2 2 oz)  Vitals:    11/05/22 1449 11/05/22 2123 11/06/22 0554 11/06/22 0720   BP: 152/68 112/65  142/61   BP Location:  Left arm     Pulse: 73 74  73   Resp: 20 20  18   Temp:  99 4 °F (37 4 °C)  97 9 °F (36 6 °C)   TempSrc:  Oral  Oral   SpO2: 92% (!) 89%  97%   Weight:   112 kg (247 lb 2 2 oz)    Height:           Intake/Output Summary (Last 24 hours) at 11/6/2022 0755  Last data filed at 11/6/2022 0558  Gross per 24 hour   Intake 1020 ml   Output 675 ml   Net 345 ml     General:  Awake, alert, appears comfortable and in no acute distress  Nontoxic    Skin: No rash, warm, good skin turgor   Eyes:  PERRL, EOMI, sclerae nonicteric   no periorbital edema   ENT:  Moist mucous membranes  Neck:  Trachea midline, symmetric  No JVD  Chest:  Clear to auscultation bilaterally without wheezes, crackles or rhonchi  Decreased breath sounds bilateral bases  CVS:  Regular rate and rhythm without murmur, gallop or rub  S1 and S2 identified and normal   No S3, S4    Abdomen:  Soft, nontender, nondistended without masses  Normal bowel sounds x 4 quadrants  No bruit  Extremities:  Warm, pink, motor and sensory intact and well perfused  No cyanosis, pallor  1+ BLE edema extending to thigh  Neuro:  Awake, alert, oriented x3  Grossly intact  Psych:  Appropriate affect  Mentating appropriately    Normal mental status exam       Medications:    Current Facility-Administered Medications:   •  acetaminophen (TYLENOL) tablet 650 mg, 650 mg, Oral, Q6H PRN, Sugey Wiseman PA-C, 650 mg at 10/23/22 1751  •  albuterol (PROVENTIL HFA,VENTOLIN HFA) inhaler 2 puff, 2 puff, Inhalation, Q4H PRN, Amy Kennedy MD, 2 puff at 10/30/22 0611  •  amLODIPine (NORVASC) tablet 10 mg, 10 mg, Oral, Daily, ALEX Patton, 10 mg at 11/05/22 9995  •  aspirin chewable tablet 81 mg, 81 mg, Oral, Daily, Sugey Wiseman PA-C, 81 mg at 11/05/22 9765  •  atorvastatin (LIPITOR) tablet 40 mg, 40 mg, Oral, Daily With Dinner, Marisela June MD, 40 mg at 11/05/22 1700  •  carvedilol (COREG) tablet 25 mg, 25 mg, Oral, BID With Meals, ALEX Patton, 25 mg at 11/05/22 1700  •  clopidogrel (PLAVIX) tablet 75 mg, 75 mg, Oral, Daily, Jailyn Torres PA-C, 75 mg at 11/05/22 6943  •  furosemide (LASIX) tablet 40 mg, 40 mg, Oral, Daily, Aruna Dover MD, 40 mg at 11/05/22 0065  •  gabapentin (NEURONTIN) capsule 200 mg, 200 mg, Oral, BID, Sugey Wiseman PA-C, 200 mg at 11/05/22 8385  •  heparin (porcine) 25,000 units in 0 45% NaCl 250 mL infusion (premix), 3-30 Units/kg/hr (Order-Specific), Intravenous, Titrated, Tamra , DO, Last Rate: 23 1 mL/hr at 22 0712, 21 Units/kg/hr at 22 8566  •  heparin (porcine) injection 4,400 Units, 4,400 Units, Intravenous, Q1H PRN, Tamra , DO, 4,400 Units at 22 1720  •  heparin (porcine) injection 8,800 Units, 8,800 Units, Intravenous, Q1H PRN, Tamra , DO  •  hydrALAZINE (APRESOLINE) injection 10 mg, 10 mg, Intravenous, Q6H PRN, Sugey Wiseman PA-C, 10 mg at 10/27/22 1857  •  insulin glargine (LANTUS) subcutaneous injection 15 Units 0 15 mL, 15 Units, Subcutaneous, HS, Eduarda Neil MD, 15 Units at 22  •  insulin lispro (HumaLOG) 100 units/mL subcutaneous injection 1-6 Units, 1-6 Units, Subcutaneous, 4x Daily (AC & HS), 2 Units at 22 **AND** Fingerstick Glucose (POCT), , , 4x Daily AC and at bedtime, Sugey Wiseman PA-C  •  insulin lispro (HumaLOG) 100 units/mL subcutaneous injection 3 Units, 3 Units, Subcutaneous, TID With Meals, Eduarda Neil MD, 3 Units at 22 1700  •  LORazepam (ATIVAN) tablet 0 5 mg, 0 5 mg, Oral, Q8H PRN, Amy Alas MD, 0 5 mg at 22 0559  •  metoclopramide (REGLAN) injection 5 mg, 5 mg, Intravenous, Q6H PRN, Ana María Warner DO, 5 mg at 10/23/22 8701  •  morphine injection 4 mg, 4 mg, Intravenous, Q4H PRN, Ana María Warner DO, 4 mg at 22 2130  •  nystatin (MYCOSTATIN) powder, , Topical, BID, Eduarda Neil MD, Given at 22 1751  •  ondansetron (ZOFRAN) injection 4 mg, 4 mg, Intravenous, Q6H PRN, Sugey Wiseman PA-C, 4 mg at 10/22/22 2241  •  oxyCODONE (ROXICODONE) IR tablet 5 mg, 5 mg, Oral, Q4H PRN, 5 mg at 22 1052 **OR** oxyCODONE (ROXICODONE) immediate release tablet 10 mg, 10 mg, Oral, Q4H PRN, Amy Parry MD, 10 mg at 22 0118  •  sertraline (ZOLOFT) tablet 100 mg, 100 mg, Oral, Daily, Sugey Wiseman PA-C, 100 mg at 22 5994    Laboratory Results:  Results from last 7 days Lab Units 11/06/22  0458 11/05/22  0541 11/04/22  0503 11/03/22  0445 11/02/22  0607 11/01/22  2327 11/01/22 0445 10/31/22  0536   WBC Thousand/uL 8 87 9 62 11 80* 10 85* 13 66* 12 64* 13 43* 12 59*   HEMOGLOBIN g/dL 7 8* 8 0* 8 4* 6 8* 7 6* 7 1* 8 0* 7 8*   HEMATOCRIT % 25 7* 25 8* 27 1* 23 1* 25 4* 23 0* 26 1* 25 7*   PLATELETS Thousands/uL 266 277 284 296 316 282 316 371   SODIUM mmol/L 138 138 138 142 140  --  139 138   POTASSIUM mmol/L 3 8 5 0 3 8 3 6 3 7  --  3 8 4 4   CHLORIDE mmol/L 99 100 100 104 101  --  102 103   CO2 mmol/L 30 31 28 31 31  --  30 28   BUN mg/dL 19 18 17 15 15  --  20 20   CREATININE mg/dL 1 37* 1 37* 1 38* 1 30 1 35*  --  1 26 1 38*   CALCIUM mg/dL 8 6 8 5 8 8 8 4 9 0  --  8 9 9 4   MAGNESIUM mg/dL  --   --   --   --   --   --   --  1 7   PHOSPHORUS mg/dL  --   --   --   --   --   --   --  5 5*       I have personally reviewed the blood work as stated above and in my note  I have personally reviewed internal Medicine, co-consultants and previous nephrology notes

## 2022-11-06 NOTE — ASSESSMENT & PLAN NOTE
Lab Results   Component Value Date    HGBA1C 9 8 (H) 10/25/2022     Recent Labs     11/05/22  1111 11/05/22  1542 11/05/22  2106 11/06/22  0717   POCGLU 175* 204* 204* 153*     · Uncontrolled with A1C at 9 8  · Blood glucose controlled on lantus 30 units daily  · Change to lantus 15 units bedtime, add 3 units TID with meals  · Continue insulin sliding scale

## 2022-11-07 ENCOUNTER — APPOINTMENT (INPATIENT)
Dept: RADIOLOGY | Facility: HOSPITAL | Age: 62
DRG: 853 | End: 2022-11-07
Payer: COMMERCIAL

## 2022-11-07 ENCOUNTER — ANESTHESIA (INPATIENT)
Dept: PERIOP | Facility: HOSPITAL | Age: 62
End: 2022-11-07

## 2022-11-07 PROBLEM — J96.01 ACUTE RESPIRATORY FAILURE WITH HYPOXIA (HCC): Status: ACTIVE | Noted: 2018-02-24

## 2022-11-07 LAB
ABO GROUP BLD: NORMAL
ALBUMIN SERPL BCP-MCNC: 1.8 G/DL (ref 3.5–5)
ALP SERPL-CCNC: 209 U/L (ref 46–116)
ALT SERPL W P-5'-P-CCNC: 23 U/L (ref 12–78)
ANION GAP SERPL CALCULATED.3IONS-SCNC: 12 MMOL/L (ref 4–13)
ANION GAP SERPL CALCULATED.3IONS-SCNC: 9 MMOL/L (ref 4–13)
ANISOCYTOSIS BLD QL SMEAR: PRESENT
APTT PPP: 40 SECONDS (ref 23–37)
APTT PPP: 52 SECONDS (ref 23–37)
APTT PPP: >210 SECONDS (ref 23–37)
AST SERPL W P-5'-P-CCNC: 42 U/L (ref 5–45)
BASE EXCESS BLDA CALC-SCNC: -3.2 MMOL/L
BASOPHILS # BLD MANUAL: 0 THOUSAND/UL (ref 0–0.1)
BASOPHILS NFR MAR MANUAL: 0 % (ref 0–1)
BILIRUB SERPL-MCNC: 1.53 MG/DL (ref 0.2–1)
BLD GP AB SCN SERPL QL: NEGATIVE
BUN SERPL-MCNC: 19 MG/DL (ref 5–25)
BUN SERPL-MCNC: 19 MG/DL (ref 5–25)
CA-I BLD-SCNC: 0.99 MMOL/L (ref 1.12–1.32)
CALCIUM ALBUM COR SERPL-MCNC: 9.3 MG/DL (ref 8.3–10.1)
CALCIUM SERPL-MCNC: 7.5 MG/DL (ref 8.3–10.1)
CALCIUM SERPL-MCNC: 8.2 MG/DL (ref 8.3–10.1)
CHLORIDE SERPL-SCNC: 100 MMOL/L (ref 96–108)
CHLORIDE SERPL-SCNC: 104 MMOL/L (ref 96–108)
CO2 SERPL-SCNC: 24 MMOL/L (ref 21–32)
CO2 SERPL-SCNC: 28 MMOL/L (ref 21–32)
CREAT SERPL-MCNC: 1.21 MG/DL (ref 0.6–1.3)
CREAT SERPL-MCNC: 1.4 MG/DL (ref 0.6–1.3)
EOSINOPHIL # BLD MANUAL: 0.42 THOUSAND/UL (ref 0–0.4)
EOSINOPHIL NFR BLD MANUAL: 3 % (ref 0–6)
ERYTHROCYTE [DISTWIDTH] IN BLOOD BY AUTOMATED COUNT: 15.1 % (ref 11.6–15.1)
ERYTHROCYTE [DISTWIDTH] IN BLOOD BY AUTOMATED COUNT: 15.8 % (ref 11.6–15.1)
GFR SERPL CREATININE-BSD FRML MDRD: 40 ML/MIN/1.73SQ M
GFR SERPL CREATININE-BSD FRML MDRD: 48 ML/MIN/1.73SQ M
GLUCOSE SERPL-MCNC: 135 MG/DL (ref 65–140)
GLUCOSE SERPL-MCNC: 141 MG/DL (ref 65–140)
GLUCOSE SERPL-MCNC: 205 MG/DL (ref 65–140)
GLUCOSE SERPL-MCNC: 207 MG/DL (ref 65–140)
GLUCOSE SERPL-MCNC: 234 MG/DL (ref 65–140)
GLUCOSE SERPL-MCNC: 244 MG/DL (ref 65–140)
HCO3 BLDA-SCNC: 22.5 MMOL/L (ref 22–28)
HCT VFR BLD AUTO: 22.8 % (ref 34.8–46.1)
HCT VFR BLD AUTO: 27.2 % (ref 34.8–46.1)
HGB BLD-MCNC: 7 G/DL (ref 11.5–15.4)
HGB BLD-MCNC: 8.7 G/DL (ref 11.5–15.4)
KCT BLD-ACNC: 170 SEC (ref 89–137)
KCT BLD-ACNC: 200 SEC (ref 89–137)
KCT BLD-ACNC: 206 SEC (ref 89–137)
KCT BLD-ACNC: 212 SEC (ref 89–137)
KCT BLD-ACNC: 212 SEC (ref 89–137)
KCT BLD-ACNC: 225 SEC (ref 89–137)
LYMPHOCYTES # BLD AUTO: 0.69 THOUSAND/UL (ref 0.6–4.47)
LYMPHOCYTES # BLD AUTO: 5 % (ref 14–44)
MAGNESIUM SERPL-MCNC: 1.7 MG/DL (ref 1.6–2.6)
MCH RBC QN AUTO: 28.1 PG (ref 26.8–34.3)
MCH RBC QN AUTO: 29.1 PG (ref 26.8–34.3)
MCHC RBC AUTO-ENTMCNC: 30.7 G/DL (ref 31.4–37.4)
MCHC RBC AUTO-ENTMCNC: 32 G/DL (ref 31.4–37.4)
MCV RBC AUTO: 91 FL (ref 82–98)
MCV RBC AUTO: 92 FL (ref 82–98)
MONOCYTES # BLD AUTO: 0.42 THOUSAND/UL (ref 0–1.22)
MONOCYTES NFR BLD: 3 % (ref 4–12)
NASAL CANNULA: 15
NEUTROPHILS # BLD MANUAL: 12.35 THOUSAND/UL (ref 1.85–7.62)
NEUTS BAND NFR BLD MANUAL: 2 % (ref 0–8)
NEUTS SEG NFR BLD AUTO: 87 % (ref 43–75)
O2 CT BLDA-SCNC: 14.2 ML/DL (ref 16–23)
OXYHGB MFR BLDA: 96.9 % (ref 94–97)
PCO2 BLDA: 43.2 MM HG (ref 36–44)
PH BLDA: 7.33 [PH] (ref 7.35–7.45)
PHOSPHATE SERPL-MCNC: 5.6 MG/DL (ref 2.3–4.1)
PLATELET # BLD AUTO: 157 THOUSANDS/UL (ref 149–390)
PLATELET # BLD AUTO: 266 THOUSANDS/UL (ref 149–390)
PLATELET BLD QL SMEAR: ADEQUATE
PMV BLD AUTO: 8.9 FL (ref 8.9–12.7)
PMV BLD AUTO: 9.2 FL (ref 8.9–12.7)
PO2 BLDA: 101.1 MM HG (ref 75–129)
POTASSIUM SERPL-SCNC: 3.8 MMOL/L (ref 3.5–5.3)
POTASSIUM SERPL-SCNC: 4.3 MMOL/L (ref 3.5–5.3)
PROT SERPL-MCNC: 6.1 G/DL (ref 6.4–8.4)
RBC # BLD AUTO: 2.49 MILLION/UL (ref 3.81–5.12)
RBC # BLD AUTO: 2.99 MILLION/UL (ref 3.81–5.12)
RH BLD: NEGATIVE
SODIUM SERPL-SCNC: 137 MMOL/L (ref 135–147)
SODIUM SERPL-SCNC: 140 MMOL/L (ref 135–147)
SPECIMEN EXPIRATION DATE: NORMAL
SPECIMEN SOURCE: ABNORMAL
WBC # BLD AUTO: 13.88 THOUSAND/UL (ref 4.31–10.16)
WBC # BLD AUTO: 8.74 THOUSAND/UL (ref 4.31–10.16)

## 2022-11-07 PROCEDURE — C1887 CATHETER, GUIDING: HCPCS | Performed by: SURGERY

## 2022-11-07 PROCEDURE — 047M3Z1 DILATION OF RIGHT POPLITEAL ARTERY USING DRUG-COATED BALLOON, PERCUTANEOUS APPROACH: ICD-10-PCS | Performed by: SURGERY

## 2022-11-07 PROCEDURE — 04CK3ZZ EXTIRPATION OF MATTER FROM RIGHT FEMORAL ARTERY, PERCUTANEOUS APPROACH: ICD-10-PCS | Performed by: SURGERY

## 2022-11-07 PROCEDURE — C1894 INTRO/SHEATH, NON-LASER: HCPCS | Performed by: SURGERY

## 2022-11-07 PROCEDURE — C1725 CATH, TRANSLUMIN NON-LASER: HCPCS | Performed by: SURGERY

## 2022-11-07 PROCEDURE — C1884 EMBOLIZATION PROTECT SYST: HCPCS | Performed by: SURGERY

## 2022-11-07 PROCEDURE — B410YZZ FLUOROSCOPY OF ABDOMINAL AORTA USING OTHER CONTRAST: ICD-10-PCS | Performed by: SURGERY

## 2022-11-07 PROCEDURE — C1769 GUIDE WIRE: HCPCS | Performed by: SURGERY

## 2022-11-07 PROCEDURE — C2623 CATH, TRANSLUMIN, DRUG-COAT: HCPCS | Performed by: SURGERY

## 2022-11-07 PROCEDURE — 85027 COMPLETE CBC AUTOMATED: CPT | Performed by: STUDENT IN AN ORGANIZED HEALTH CARE EDUCATION/TRAINING PROGRAM

## 2022-11-07 PROCEDURE — 85007 BL SMEAR W/DIFF WBC COUNT: CPT | Performed by: SURGERY

## 2022-11-07 PROCEDURE — 86850 RBC ANTIBODY SCREEN: CPT | Performed by: STUDENT IN AN ORGANIZED HEALTH CARE EDUCATION/TRAINING PROGRAM

## 2022-11-07 PROCEDURE — 99233 SBSQ HOSP IP/OBS HIGH 50: CPT | Performed by: INTERNAL MEDICINE

## 2022-11-07 PROCEDURE — 82330 ASSAY OF CALCIUM: CPT | Performed by: SURGERY

## 2022-11-07 PROCEDURE — 82948 REAGENT STRIP/BLOOD GLUCOSE: CPT

## 2022-11-07 PROCEDURE — 80053 COMPREHEN METABOLIC PANEL: CPT | Performed by: SURGERY

## 2022-11-07 PROCEDURE — 85027 COMPLETE CBC AUTOMATED: CPT | Performed by: SURGERY

## 2022-11-07 PROCEDURE — 84100 ASSAY OF PHOSPHORUS: CPT | Performed by: SURGERY

## 2022-11-07 PROCEDURE — 85730 THROMBOPLASTIN TIME PARTIAL: CPT | Performed by: SURGERY

## 2022-11-07 PROCEDURE — 86901 BLOOD TYPING SEROLOGIC RH(D): CPT | Performed by: STUDENT IN AN ORGANIZED HEALTH CARE EDUCATION/TRAINING PROGRAM

## 2022-11-07 PROCEDURE — C1874 STENT, COATED/COV W/DEL SYS: HCPCS | Performed by: SURGERY

## 2022-11-07 PROCEDURE — 37186 SEC ART THROMBECTOMY ADD-ON: CPT | Performed by: SURGERY

## 2022-11-07 PROCEDURE — X27H395 DILATION OF RIGHT FEMORAL ARTERY WITH TWO SUSTAINED RELEASE DRUG-ELUTING INTRALUMINAL DEVICES, PERCUTANEOUS APPROACH, NEW TECHNOLOGY GROUP 5: ICD-10-PCS | Performed by: SURGERY

## 2022-11-07 PROCEDURE — P9016 RBC LEUKOCYTES REDUCED: HCPCS

## 2022-11-07 PROCEDURE — 83735 ASSAY OF MAGNESIUM: CPT | Performed by: SURGERY

## 2022-11-07 PROCEDURE — 77001 FLUOROGUIDE FOR VEIN DEVICE: CPT

## 2022-11-07 PROCEDURE — 30233N1 TRANSFUSION OF NONAUTOLOGOUS RED BLOOD CELLS INTO PERIPHERAL VEIN, PERCUTANEOUS APPROACH: ICD-10-PCS | Performed by: SURGERY

## 2022-11-07 PROCEDURE — 85730 THROMBOPLASTIN TIME PARTIAL: CPT | Performed by: STUDENT IN AN ORGANIZED HEALTH CARE EDUCATION/TRAINING PROGRAM

## 2022-11-07 PROCEDURE — 86923 COMPATIBILITY TEST ELECTRIC: CPT

## 2022-11-07 PROCEDURE — 86900 BLOOD TYPING SEROLOGIC ABO: CPT | Performed by: STUDENT IN AN ORGANIZED HEALTH CARE EDUCATION/TRAINING PROGRAM

## 2022-11-07 PROCEDURE — 85347 COAGULATION TIME ACTIVATED: CPT

## 2022-11-07 PROCEDURE — 99024 POSTOP FOLLOW-UP VISIT: CPT | Performed by: SURGERY

## 2022-11-07 PROCEDURE — 80048 BASIC METABOLIC PNL TOTAL CA: CPT | Performed by: PHYSICIAN ASSISTANT

## 2022-11-07 PROCEDURE — C1760 CLOSURE DEV, VASC: HCPCS | Performed by: SURGERY

## 2022-11-07 PROCEDURE — 37227 PR REVSC OPN/PRQ FEM/POP W/STNT/ATHRC/ANGIOP SM VSL: CPT | Performed by: SURGERY

## 2022-11-07 PROCEDURE — B41FYZZ FLUOROSCOPY OF RIGHT LOWER EXTREMITY ARTERIES USING OTHER CONTRAST: ICD-10-PCS | Performed by: SURGERY

## 2022-11-07 PROCEDURE — 82805 BLOOD GASES W/O2 SATURATION: CPT | Performed by: NURSE PRACTITIONER

## 2022-11-07 DEVICE — DRUG-ELUTING VASCULAR STENT SYSTEM
Type: IMPLANTABLE DEVICE | Site: ARTERIAL | Status: FUNCTIONAL
Brand: ELUVIA™

## 2022-11-07 DEVICE — MYNX CONTROL VCD 6F 7F
Type: IMPLANTABLE DEVICE | Site: ARTERIAL | Status: FUNCTIONAL
Brand: MYNX CONTROL

## 2022-11-07 RX ORDER — ONDANSETRON 2 MG/ML
4 INJECTION INTRAMUSCULAR; INTRAVENOUS ONCE AS NEEDED
Status: DISCONTINUED | OUTPATIENT
Start: 2022-11-07 | End: 2022-11-08

## 2022-11-07 RX ORDER — LIDOCAINE HYDROCHLORIDE 20 MG/ML
INJECTION, SOLUTION EPIDURAL; INFILTRATION; INTRACAUDAL; PERINEURAL AS NEEDED
Status: DISCONTINUED | OUTPATIENT
Start: 2022-11-07 | End: 2022-11-07

## 2022-11-07 RX ORDER — HYDRALAZINE HYDROCHLORIDE 20 MG/ML
20 INJECTION INTRAMUSCULAR; INTRAVENOUS ONCE
Status: COMPLETED | OUTPATIENT
Start: 2022-11-07 | End: 2022-11-07

## 2022-11-07 RX ORDER — SODIUM CHLORIDE 9 MG/ML
INJECTION, SOLUTION INTRAVENOUS CONTINUOUS PRN
Status: DISCONTINUED | OUTPATIENT
Start: 2022-11-07 | End: 2022-11-07

## 2022-11-07 RX ORDER — HEPARIN SODIUM 200 [USP'U]/100ML
INJECTION, SOLUTION INTRAVENOUS
Status: COMPLETED | OUTPATIENT
Start: 2022-11-07 | End: 2022-11-07

## 2022-11-07 RX ORDER — FENTANYL CITRATE 50 UG/ML
INJECTION, SOLUTION INTRAMUSCULAR; INTRAVENOUS AS NEEDED
Status: DISCONTINUED | OUTPATIENT
Start: 2022-11-07 | End: 2022-11-07

## 2022-11-07 RX ORDER — MIDAZOLAM HYDROCHLORIDE 2 MG/2ML
INJECTION, SOLUTION INTRAMUSCULAR; INTRAVENOUS AS NEEDED
Status: DISCONTINUED | OUTPATIENT
Start: 2022-11-07 | End: 2022-11-07

## 2022-11-07 RX ORDER — FUROSEMIDE 10 MG/ML
INJECTION INTRAMUSCULAR; INTRAVENOUS AS NEEDED
Status: DISCONTINUED | OUTPATIENT
Start: 2022-11-07 | End: 2022-11-07

## 2022-11-07 RX ORDER — PROPOFOL 10 MG/ML
INJECTION, EMULSION INTRAVENOUS AS NEEDED
Status: DISCONTINUED | OUTPATIENT
Start: 2022-11-07 | End: 2022-11-07

## 2022-11-07 RX ORDER — HYDROMORPHONE HCL/PF 1 MG/ML
0.5 SYRINGE (ML) INJECTION
Status: DISCONTINUED | OUTPATIENT
Start: 2022-11-07 | End: 2022-11-08

## 2022-11-07 RX ORDER — VECURONIUM BROMIDE 1 MG/ML
INJECTION, POWDER, LYOPHILIZED, FOR SOLUTION INTRAVENOUS AS NEEDED
Status: DISCONTINUED | OUTPATIENT
Start: 2022-11-07 | End: 2022-11-07

## 2022-11-07 RX ORDER — ONDANSETRON 2 MG/ML
INJECTION INTRAMUSCULAR; INTRAVENOUS AS NEEDED
Status: DISCONTINUED | OUTPATIENT
Start: 2022-11-07 | End: 2022-11-07

## 2022-11-07 RX ORDER — INSULIN LISPRO 100 [IU]/ML
1-6 INJECTION, SOLUTION INTRAVENOUS; SUBCUTANEOUS EVERY 6 HOURS SCHEDULED
Status: DISCONTINUED | OUTPATIENT
Start: 2022-11-07 | End: 2022-11-08

## 2022-11-07 RX ORDER — HEPARIN SODIUM 1000 [USP'U]/ML
INJECTION, SOLUTION INTRAVENOUS; SUBCUTANEOUS AS NEEDED
Status: DISCONTINUED | OUTPATIENT
Start: 2022-11-07 | End: 2022-11-07

## 2022-11-07 RX ORDER — FENTANYL CITRATE/PF 50 MCG/ML
25 SYRINGE (ML) INJECTION
Status: DISCONTINUED | OUTPATIENT
Start: 2022-11-07 | End: 2022-11-08

## 2022-11-07 RX ORDER — CEFAZOLIN SODIUM 1 G/3ML
INJECTION, POWDER, FOR SOLUTION INTRAMUSCULAR; INTRAVENOUS AS NEEDED
Status: DISCONTINUED | OUTPATIENT
Start: 2022-11-07 | End: 2022-11-07

## 2022-11-07 RX ORDER — LABETALOL HYDROCHLORIDE 5 MG/ML
20 INJECTION, SOLUTION INTRAVENOUS ONCE
Status: COMPLETED | OUTPATIENT
Start: 2022-11-07 | End: 2022-11-07

## 2022-11-07 RX ADMIN — HEPARIN SODIUM 1000 UNITS: 1000 INJECTION INTRAVENOUS; SUBCUTANEOUS at 10:04

## 2022-11-07 RX ADMIN — HEPARIN SODIUM 3000 UNITS: 1000 INJECTION INTRAVENOUS; SUBCUTANEOUS at 12:27

## 2022-11-07 RX ADMIN — OXYCODONE HYDROCHLORIDE 10 MG: 10 TABLET ORAL at 00:26

## 2022-11-07 RX ADMIN — PROPOFOL 130 MG: 10 INJECTION, EMULSION INTRAVENOUS at 07:52

## 2022-11-07 RX ADMIN — HEPARIN SODIUM 21 UNITS/KG/HR: 10000 INJECTION, SOLUTION INTRAVENOUS at 00:15

## 2022-11-07 RX ADMIN — LORAZEPAM 0.5 MG: 0.5 TABLET ORAL at 00:26

## 2022-11-07 RX ADMIN — FENTANYL CITRATE 50 MCG: 50 INJECTION INTRAMUSCULAR; INTRAVENOUS at 13:02

## 2022-11-07 RX ADMIN — HEPARIN SODIUM 2000 UNITS: 1000 INJECTION INTRAVENOUS; SUBCUTANEOUS at 11:15

## 2022-11-07 RX ADMIN — VECURONIUM BROMIDE 7 MG: 1 INJECTION, POWDER, LYOPHILIZED, FOR SOLUTION INTRAVENOUS at 07:52

## 2022-11-07 RX ADMIN — HEPARIN SODIUM 2000 UNITS: 1000 INJECTION INTRAVENOUS; SUBCUTANEOUS at 13:16

## 2022-11-07 RX ADMIN — VECURONIUM BROMIDE 2 MG: 1 INJECTION, POWDER, LYOPHILIZED, FOR SOLUTION INTRAVENOUS at 11:07

## 2022-11-07 RX ADMIN — HEPARIN SODIUM 2000 UNITS: 1000 INJECTION INTRAVENOUS; SUBCUTANEOUS at 10:39

## 2022-11-07 RX ADMIN — FUROSEMIDE 10 MG: 10 INJECTION, SOLUTION INTRAVENOUS at 14:10

## 2022-11-07 RX ADMIN — HEPARIN SODIUM 23 UNITS/KG/HR: 10000 INJECTION, SOLUTION INTRAVENOUS at 07:08

## 2022-11-07 RX ADMIN — CEFAZOLIN SODIUM 1000 MG: 1 INJECTION, POWDER, FOR SOLUTION INTRAMUSCULAR; INTRAVENOUS at 12:02

## 2022-11-07 RX ADMIN — NYSTATIN: 100000 POWDER TOPICAL at 19:12

## 2022-11-07 RX ADMIN — LABETALOL HYDROCHLORIDE 20 MG: 5 INJECTION, SOLUTION INTRAVENOUS at 15:31

## 2022-11-07 RX ADMIN — SODIUM CHLORIDE 75 ML/HR: 0.9 INJECTION, SOLUTION INTRAVENOUS at 00:18

## 2022-11-07 RX ADMIN — HEPARIN SODIUM 10000 UNITS: 1000 INJECTION INTRAVENOUS; SUBCUTANEOUS at 09:21

## 2022-11-07 RX ADMIN — SODIUM CHLORIDE 5 MG/HR: 0.9 INJECTION, SOLUTION INTRAVENOUS at 17:50

## 2022-11-07 RX ADMIN — MIDAZOLAM 2 MG: 1 INJECTION INTRAMUSCULAR; INTRAVENOUS at 07:44

## 2022-11-07 RX ADMIN — SODIUM CHLORIDE 15 MG/HR: 0.9 INJECTION, SOLUTION INTRAVENOUS at 22:18

## 2022-11-07 RX ADMIN — INSULIN LISPRO 2 UNITS: 100 INJECTION, SOLUTION INTRAVENOUS; SUBCUTANEOUS at 19:11

## 2022-11-07 RX ADMIN — OXYCODONE HYDROCHLORIDE 10 MG: 10 TABLET ORAL at 04:45

## 2022-11-07 RX ADMIN — FENTANYL CITRATE 50 MCG: 50 INJECTION INTRAMUSCULAR; INTRAVENOUS at 12:38

## 2022-11-07 RX ADMIN — ONDANSETRON 4 MG: 2 INJECTION INTRAMUSCULAR; INTRAVENOUS at 14:34

## 2022-11-07 RX ADMIN — FENTANYL CITRATE 50 MCG: 50 INJECTION INTRAMUSCULAR; INTRAVENOUS at 09:33

## 2022-11-07 RX ADMIN — VECURONIUM BROMIDE 3 MG: 1 INJECTION, POWDER, LYOPHILIZED, FOR SOLUTION INTRAVENOUS at 08:41

## 2022-11-07 RX ADMIN — SODIUM CHLORIDE: 0.9 INJECTION, SOLUTION INTRAVENOUS at 12:36

## 2022-11-07 RX ADMIN — VECURONIUM BROMIDE 2 MG: 1 INJECTION, POWDER, LYOPHILIZED, FOR SOLUTION INTRAVENOUS at 10:52

## 2022-11-07 RX ADMIN — FUROSEMIDE 10 MG: 10 INJECTION, SOLUTION INTRAVENOUS at 13:37

## 2022-11-07 RX ADMIN — FENTANYL CITRATE 50 MCG: 50 INJECTION INTRAMUSCULAR; INTRAVENOUS at 08:39

## 2022-11-07 RX ADMIN — HEPARIN SODIUM 18 UNITS/KG/HR: 10000 INJECTION, SOLUTION INTRAVENOUS at 21:59

## 2022-11-07 RX ADMIN — FENTANYL CITRATE 50 MCG: 50 INJECTION INTRAMUSCULAR; INTRAVENOUS at 11:21

## 2022-11-07 RX ADMIN — SODIUM CHLORIDE: 0.9 INJECTION, SOLUTION INTRAVENOUS at 07:52

## 2022-11-07 RX ADMIN — HEPARIN SODIUM 4400 UNITS: 1000 INJECTION INTRAVENOUS; SUBCUTANEOUS at 07:10

## 2022-11-07 RX ADMIN — HEPARIN SODIUM 2000 UNITS: 1000 INJECTION INTRAVENOUS; SUBCUTANEOUS at 11:49

## 2022-11-07 RX ADMIN — HEPARIN SODIUM 2000 UNITS: 1000 INJECTION INTRAVENOUS; SUBCUTANEOUS at 13:48

## 2022-11-07 RX ADMIN — VECURONIUM BROMIDE 2 MG: 1 INJECTION, POWDER, LYOPHILIZED, FOR SOLUTION INTRAVENOUS at 12:34

## 2022-11-07 RX ADMIN — HYDRALAZINE HYDROCHLORIDE 20 MG: 20 INJECTION, SOLUTION INTRAMUSCULAR; INTRAVENOUS at 15:45

## 2022-11-07 RX ADMIN — ONDANSETRON 4 MG: 2 INJECTION INTRAMUSCULAR; INTRAVENOUS at 23:52

## 2022-11-07 RX ADMIN — SODIUM CHLORIDE: 0.9 INJECTION, SOLUTION INTRAVENOUS at 09:34

## 2022-11-07 RX ADMIN — HEPARIN SODIUM 2000 UNITS: 1000 INJECTION INTRAVENOUS; SUBCUTANEOUS at 14:23

## 2022-11-07 RX ADMIN — INSULIN GLARGINE 15 UNITS: 100 INJECTION, SOLUTION SUBCUTANEOUS at 21:51

## 2022-11-07 RX ADMIN — MORPHINE SULFATE 4 MG: 4 INJECTION INTRAVENOUS at 06:01

## 2022-11-07 RX ADMIN — VECURONIUM BROMIDE 2 MG: 1 INJECTION, POWDER, LYOPHILIZED, FOR SOLUTION INTRAVENOUS at 14:09

## 2022-11-07 RX ADMIN — SUGAMMADEX 200 MG: 100 INJECTION, SOLUTION INTRAVENOUS at 14:53

## 2022-11-07 RX ADMIN — CEFAZOLIN SODIUM 2000 MG: 1 INJECTION, POWDER, FOR SOLUTION INTRAMUSCULAR; INTRAVENOUS at 08:00

## 2022-11-07 RX ADMIN — FENTANYL CITRATE 50 MCG: 50 INJECTION INTRAMUSCULAR; INTRAVENOUS at 07:52

## 2022-11-07 RX ADMIN — HEPARIN SODIUM 8800 UNITS: 1000 INJECTION INTRAVENOUS; SUBCUTANEOUS at 21:52

## 2022-11-07 RX ADMIN — HEPARIN SODIUM 1000 UNITS: 1000 INJECTION INTRAVENOUS; SUBCUTANEOUS at 09:33

## 2022-11-07 RX ADMIN — SODIUM CHLORIDE 15 MG/HR: 0.9 INJECTION, SOLUTION INTRAVENOUS at 20:36

## 2022-11-07 RX ADMIN — SODIUM CHLORIDE 5 MG/HR: 0.9 INJECTION, SOLUTION INTRAVENOUS at 14:45

## 2022-11-07 RX ADMIN — PANTOPRAZOLE SODIUM 40 MG: 40 TABLET, DELAYED RELEASE ORAL at 04:45

## 2022-11-07 RX ADMIN — INSULIN HUMAN 3 UNITS: 100 INJECTION, SOLUTION PARENTERAL at 15:53

## 2022-11-07 RX ADMIN — LIDOCAINE HYDROCHLORIDE 100 MG: 20 INJECTION, SOLUTION EPIDURAL; INFILTRATION; INTRACAUDAL; PERINEURAL at 07:52

## 2022-11-07 NOTE — ASSESSMENT & PLAN NOTE
Hemoglobin fluctuating with baseline around 10  S/p 1 unit transfused 11/3/2022, H/H improved and has remained stable ever since  FOBT neg, no current source of bleeding, groin without hematoma  Continue to monitor H/H

## 2022-11-07 NOTE — QUICK NOTE
Renal note:    Patient remains in OR most of the day today  Chart reviewed  Creatinine stable 1 4  Will see patient tomorrow  Please call with any questions

## 2022-11-07 NOTE — PROGRESS NOTES
[facility]  Progress Note - [address] 1960, 65 y o  female MRN: [id number]  [address] 221-02 Encounter: [id number]  Primary Care Provider: ALEX Ventura   Date and time admitted to hospital: 10/21/2022  7:58 PM    PAD (peripheral artery disease) [facility])  Assessment & Plan  65year old female former smoker w/HTN, HLD, uncontrolled type II DM (A1c 9 8), hx CVA, presenting with nonhealing extensive L heel ulceration and R hallux and 5th digit ulceration  Vascular surgery consulted for input  S/p abdominal aortogram, LLE run off w/R ALLISON PTA/stent and LLE SFA shockwave angioplasty/FATOU 10/31/22 (IR-[facility])    -Post intervention SHARIF 11/1/22: Rt: 0 51 (0 80) Lt: 0 74/85/37  -CTA abd/pelvis w/ run off 11/1/22: Flow-limiting dissection flap at the prior arteriotomy site in the R CFA superimposed upon moderate to severe atherosclerotic disease  Focal severe atherosclerotic narrowing at the adductor canal with additional moderate atherosclerotic narrowing throughout the remainder of the R SFA  -Vein mapping 11/2/22: b/l GSV >2mm in caliber from the groin to the distal calf     sCr/eGFR: 1 3/44    Recommendations:  -Bilateral tissue loss in the setting of uncontrolled type II DM and NYDIA on admission, now s/p angiogram w/intervention  Groin access is soft without hematoma or ecchymosis  -S/p angiogram 10/31/22 patient noted to be in acute respiratory distress and hypoxic, requiring 10L O2 nonrebreather to keep O2 sat >90  Felt to be secondary to fluid overload  Patient diuresed and continues to improve clinically and weaning O2 as tolerated  Now on 2 L NC  -D/w Dr Gretchen Abrams, L heel ulceration stable without OM  R foot tissue loss likely will require surgical intervention  -Now s/p LLE angiogram w/ R ALLISON PTA/stent and L SFA shockwave angioplasty and FATOU   Two vessel run off to the LLE via AT/peroneal  PT chronically occluded   -Post intervention SHARIF obtained which did show improvement in LLE SHARIF however decrease in R SHARIF  Duplex obtained which did confirm elevated velocities of the R CFA- CTA obtained  -CTA reviewed which demonstrates flow limiting dissection of the R CFA likely secondary to closure device injury   -Patient will require surgical intervention to include R femoral endarterectomy and repair, with SFA intervention versus angioplasty  Angioplasty preferred given patient's high risk for surgery  Tentatively scheduled for OR today 11/7/22  -Cardiology consulted, appreciated recs  Intermediate to high risk  Does not require further cardiac testing  -Vein mapping complete w/ adequate vein  -Medical management with ASA, statin, Plavix  -Continue local wound care per podiatry  -Medical management and glucose control per SLIM  - continue heparin drip        Subjective/Objective     Subjective:  Reports mild pain and burning course the top of her foot  Feeling nervous about procedure today  Objective:   Vitals: Blood pressure 133/63, pulse 77, temperature 99 °F (37 2 °C), temperature source Oral, resp  rate 20, height 5' 3 75" (1 619 m), weight 113 kg (250 lb), SpO2 91 %, not currently breastfeeding  ,Body mass index is 43 25 kg/m²  I/O       11/05 0701 11/06 0700 11/06 0701 11/07 0700    P  O  1020 720    Total Intake(mL/kg) 1020 (9 1) 720 (6 4)    Urine (mL/kg/hr) 675 (0 3) 1800 (0 7)    Total Output 675 1800    Net +345 -1080          Unmeasured Urine Occurrence 2 x           Physical Exam:  Gen: NAD, Aox3, Comfortable in bed  Chest: Normal work of breathing, no respiratory distress  Abd: Soft, ND, NT  Ext:  Bilateral lower extremity edema  The right foot 5th toe is dry and gangrenous  Dorsum of foot has decreased Rue bar  Mildly tender  Skin: Warm, Dry, Intact      Lab, Imaging and other studies:   I have personally reviewed pertinent reports    , CBC with diff:   Lab Results   Component Value Date    WBC 8 74 11/07/2022    HGB 7 0 (L) 11/07/2022 HCT 22 8 (L) 11/07/2022    MCV 92 11/07/2022     11/07/2022    MCH 28 1 11/07/2022    MCHC 30 7 (L) 11/07/2022    RDW 15 1 11/07/2022    MPV 9 2 11/07/2022   , BMP/CMP:   Lab Results   Component Value Date    SODIUM 137 11/07/2022    K 3 8 11/07/2022     11/07/2022    CO2 28 11/07/2022    BUN 19 11/07/2022    CREATININE 1 40 (H) 11/07/2022    CALCIUM 8 2 (L) 11/07/2022    EGFR 40 11/07/2022     VTE Pharmacologic Prophylaxis: Heparin  VTE Mechanical Prophylaxis: sequential compression device        Jorden Person  11/7/2022  6:42 AM

## 2022-11-07 NOTE — ASSESSMENT & PLAN NOTE
65year old female former smoker w/HTN, HLD, uncontrolled type II DM (A1c 9 8), hx CVA, presenting with nonhealing extensive L heel ulceration and R hallux and 5th digit ulceration  Vascular surgery consulted for input  S/p abdominal aortogram, LLE run off w/R ALLISON PTA/stent and LLE SFA shockwave angioplasty/FATOU 10/31/22 ([facility])    -Post intervention SHARIF 11/1/22: Rt: 0 51 (0 80) Lt: 0 74/85/37  -CTA abd/pelvis w/ run off 11/1/22: Flow-limiting dissection flap at the prior arteriotomy site in the R CFA superimposed upon moderate to severe atherosclerotic disease  Focal severe atherosclerotic narrowing at the adductor canal with additional moderate atherosclerotic narrowing throughout the remainder of the R SFA  -Vein mapping 11/2/22: b/l GSV >2mm in caliber from the groin to the distal calf     sCr/eGFR: 1 3/44    Recommendations:  -Bilateral tissue loss in the setting of uncontrolled type II DM and NYDIA on admission, now s/p angiogram w/intervention  Groin access is soft without hematoma or ecchymosis  -S/p angiogram 10/31/22 patient noted to be in acute respiratory distress and hypoxic, requiring 10L O2 nonrebreather to keep O2 sat >90  Felt to be secondary to fluid overload  Patient diuresed and continues to improve clinically and weaning O2 as tolerated  Now on 2 L NC  -D/w Dr Andres Suarez, L heel ulceration stable without OM  R foot tissue loss likely will require surgical intervention  -Now s/p LLE angiogram w/ R ALLISON PTA/stent and L SFA shockwave angioplasty and FATOU  Two vessel run off to the LLE via AT/peroneal  PT chronically occluded   -Post intervention SHARIF obtained which did show improvement in LLE SHARIF however decrease in R SHARIF   Duplex obtained which did confirm elevated velocities of the R CFA- CTA obtained  -CTA reviewed which demonstrates flow limiting dissection of the R CFA likely secondary to closure device injury   -Patient will require surgical intervention to include R femoral endarterectomy and repair, with SFA intervention versus angioplasty  Angioplasty preferred given patient's high risk for surgery  Tentatively scheduled for OR today 11/7/22  -Cardiology consulted, appreciated recs  Intermediate to high risk   Does not require further cardiac testing  -Vein mapping complete w/ adequate vein  -Medical management with ASA, statin, Plavix  -Continue local wound care per podiatry  -Medical management and glucose control per SLIM  - continue heparin drip

## 2022-11-07 NOTE — ASSESSMENT & PLAN NOTE
Lab Results   Component Value Date    CREATININE 1 21 11/07/2022    CREATININE 1 40 (H) 11/07/2022    CREATININE 1 37 (H) 11/06/2022       Lab Results   Component Value Date    EGFR 48 11/07/2022    EGFR 40 11/07/2022    EGFR 41 11/06/2022       • Patient admitted on 10/22 POA1 44 bl <1 1  Nephrology was following    Creatinine  Has trended down   • Prerenal secondary to sepsis secondary to UTI, use of Ace inhibitor  • Appears to be resolved and currently you around baseline levels    Plan:  • UA w/ microscopic, Urinary retention protocol, Bladder scan, Daily weights, I/O  • Continue sodium chloride infusion 75 mL/hour  • Monitor BMP daily and observe for downward trend of creatinine  • Avoid hypoperfusion of the kidneys, minimize nephrotoxins  • RAAS Blockers/Diuretics held:  Patient's home lisinopril is being held

## 2022-11-07 NOTE — QUICK NOTE
Nonbillable note:    Transfer to [facility] for monitoring s/p right CFA atherectomy, multiple angioplasty and stenting

## 2022-11-07 NOTE — QUICK NOTE
Post Op Check Note - Surgery Resident  [address] [place] [address] 110 65 y o  female MRN: [id number]  Unit/Bed#: ICU 14 Encounter: [id number]    ASSESSMENT:  Madi [facility] 110 is a 65 y o  female who is status post RLE Agram with CFA DCB, atherectomy, SFA stent, SFA/pop angioplasty with Chocolate balloon/DCB, Pop/PT POBA, pop DCB    Subjective: No acute complaints, patient asking for water    Physical Exam:  GEN: NAD  CV: RRR  Lung: Normal effort  Ab: Soft, NT/ND  Neuro: A+Ox3  Incisions: L groin puncture CDI    BL DP dopplerable    Blood pressure (!) 174/73, pulse 72, temperature 97 6 °F (36 4 °C), resp  rate 14, height 5' 3 75" (1 619 m), weight 113 kg (250 lb), SpO2 95 %, not currently breastfeeding  ,Body mass index is 43 25 kg/m²        Intake/Output Summary (Last 24 hours) at 11/7/2022 1730  Last data filed at 11/7/2022 1524  Gross per 24 hour   Intake 3950 ml   Output 1840 ml   Net 2110 ml       Invasive Devices  Report    Peripheral Intravenous Line  Duration           Peripheral IV 11/03/22 Proximal;Right;Ventral (anterior) Forearm 3 days    Peripheral IV 11/05/22 Left;Proximal;Ventral (anterior) Forearm 2 days    Peripheral IV 11/07/22 Left Hand <1 day    Peripheral IV 11/07/22 Left Wrist <1 day          Drain  Duration           Urethral Catheter Latex 16 Fr  <1 day                VTE Pharmacologic Prophylaxis: Heparin

## 2022-11-07 NOTE — OCCUPATIONAL THERAPY NOTE
OCCUPATIONAL THERAPY CANCELLATION     OT reviewed chart  Pt to OR for R femoral endarterectomy  Will f/u and see as able and appropriate       Landon Zamudio MS, OTR/L

## 2022-11-07 NOTE — ANESTHESIA POSTPROCEDURE EVALUATION
Post-Op Assessment Note    CV Status:  Stable  Pain Score: 1    Pain management: adequate     Mental Status:  Alert and awake   Hydration Status:  Euvolemic   PONV Controlled:  Controlled   Airway Patency:  Patent      Post Op Vitals Reviewed: Yes      Staff: Anesthesiologist         No complications documented      BP      Temp      Pulse     Resp      SpO2      /76   Pulse 70   Temp 97 6 °F (36 4 °C)   Resp 12   Ht 5' 3 75" (1 619 m)   Wt 113 kg (250 lb)   SpO2 93%   BMI 43 25 kg/m²

## 2022-11-07 NOTE — PROGRESS NOTES
Progress Note - Vascular Surgery  : [facility] Vascular Surgery  on Carmelina Varghese 65 y o  female MRN: [id number]  Unit/Bed#: ICU 14 Encounter: [id number]    Assessment:  65 y o  female POD 1 s/p RLE Agram with CFA DCB, atherectomy, SFA stent, SFA/pop angioplasty with Chocolate balloon/DCB, Pop/PT POBA, pop DCB      Plan:  Diet as tolerated  Heparin drip / Plavix  Holding ASA  Will discuss long-term AC/AP plan  Appreciate ICU care overnight  Appreciate podiatry input        Subjective/Objective     Subjective: No acute complaints      Objective:     Pulse exam:  R DP strong multiphasic dopplerable signal  L pop weak pop signal    L foot remains cool, motor/sensory intact  R foot now warm/pink, motor/sensory intact    L groin puncture site CDI     Physical Exam:  GEN: NAD  HEENT: MMM  CV: RRR  Lung: Normal effort  Ab: Soft, ND/NT   Neuro: A+Ox3         Vitals:  /76   Pulse 70   Temp 97 6 °F (36 4 °C)   Resp 12   Ht 5' 3 75" (1 619 m)   Wt 113 kg (250 lb)   SpO2 93%   BMI 43 25 kg/m²     I/Os:  I/O last 3 completed shifts: In: 1020 [P O :1020]  Out: 2475 [Urine:2475]  I/O this shift:  In: 8647 [I V :3600; Blood:350]  Out: 840 [Urine:840]    Lab Results and Cultures:   Lab Results   Component Value Date    WBC 8 74 11/07/2022    HGB 7 0 (L) 11/07/2022    HCT 22 8 (L) 11/07/2022    MCV 92 11/07/2022     11/07/2022     Lab Results   Component Value Date    CALCIUM 7 5 (L) 11/07/2022    K 4 3 11/07/2022    CO2 24 11/07/2022     11/07/2022    BUN 19 11/07/2022    CREATININE 1 21 11/07/2022     Lab Results   Component Value Date    INR 1 14 11/01/2022    INR 1 03 10/31/2022    INR 1 00 10/21/2022    PROTIME 14 6 (H) 11/01/2022    PROTIME 13 5 10/31/2022    PROTIME 13 2 10/21/2022        Blood Culture:   Lab Results   Component Value Date    BLOODCX No Growth After 5 Days   10/21/2022   ,   Urinalysis:   Lab Results   Component Value Date    COLORU Light Yellow 10/21/2022 CLARITYU Turbid 10/21/2022    SPECGRAV >=1 030 10/21/2022    PHUR 6 0 10/21/2022    PHUR 6 5 02/23/2018    LEUKOCYTESUR Negative 10/21/2022    NITRITE Negative 10/21/2022    GLUCOSEU 250 (1/4%) (A) 10/21/2022    KETONESU Negative 10/21/2022    BILIRUBINUR Negative 10/21/2022    BLOODU Large (A) 10/21/2022   ,   Urine Culture:   Lab Results   Component Value Date    URINECX >100,000 cfu/ml Escherichia coli (A) 10/21/2022   ,   Wound Culure: No results found for: WOUNDCULT    Medications:  Current Facility-Administered Medications   Medication Dose Route Frequency   • acetaminophen (TYLENOL) tablet 650 mg  650 mg Oral Q6H PRN   • albuterol (PROVENTIL HFA,VENTOLIN HFA) inhaler 2 puff  2 puff Inhalation Q4H PRN   • amLODIPine (NORVASC) tablet 10 mg  10 mg Oral Daily   • aspirin chewable tablet 81 mg  81 mg Oral Daily   • atorvastatin (LIPITOR) tablet 40 mg  40 mg Oral Daily With Dinner   • carvedilol (COREG) tablet 25 mg  25 mg Oral BID With Meals   • clopidogrel (PLAVIX) tablet 75 mg  75 mg Oral Daily   • gabapentin (NEURONTIN) capsule 200 mg  200 mg Oral BID   • heparin (porcine) 25,000 units in 0 45% NaCl 250 mL infusion (premix)  3-30 Units/kg/hr (Order-Specific) Intravenous Titrated   • heparin (porcine) injection 4,400 Units  4,400 Units Intravenous Q1H PRN   • heparin (porcine) injection 8,800 Units  8,800 Units Intravenous Q1H PRN   • hydrALAZINE (APRESOLINE) injection 10 mg  10 mg Intravenous Q6H PRN   • insulin glargine (LANTUS) subcutaneous injection 15 Units 0 15 mL  15 Units Subcutaneous HS   • insulin lispro (HumaLOG) 100 units/mL subcutaneous injection 1-6 Units  1-6 Units Subcutaneous Q6H [address]   • LORazepam (ATIVAN) tablet 0 5 mg  0 5 mg Oral Q8H PRN   • metoclopramide (REGLAN) injection 5 mg  5 mg Intravenous Q6H PRN   • morphine injection 4 mg  4 mg Intravenous Q4H PRN   • niCARdipine (CARDENE) 25 mg (STANDARD CONCENTRATION) in sodium chloride 0 9% 250 mL  1-15 mg/hr Intravenous Titrated   • nystatin (MYCOSTATIN) powder   Topical BID   • ondansetron (ZOFRAN) injection 4 mg  4 mg Intravenous Q6H PRN   • oxyCODONE (ROXICODONE) IR tablet 5 mg  5 mg Oral Q4H PRN    Or   • oxyCODONE (ROXICODONE) immediate release tablet 10 mg  10 mg Oral Q4H PRN   • pantoprazole (PROTONIX) EC tablet 40 mg  40 mg Oral Early Morning   • sertraline (ZOLOFT) tablet 100 mg  100 mg Oral Daily   • sodium chloride 0 9 % infusion  75 mL/hr Intravenous Continuous         Sung Medal  11/7/2022

## 2022-11-07 NOTE — PROGRESS NOTES
[address] [facility]  Transfer To the ICU - [facility] [place] 1960, 65 y o  female MRN: [id number]  Unit/Bed#: ICU 14 Encounter: [id number]  Primary Care Provider: ALEX John   Date and time admitted to hospital: 10/21/2022  7:58 PM    PAD (peripheral artery disease) [facility])  Assessment & Plan  Patient has uncontrolled diabetes, hypertension, hyperlipidemia, patient was lost to follow-up and has not seen endocrinologist for over a year  Admits that she is poorly compliant with medication  Admitted due to Worsening lower extremity wounds  · CTA abd/pelvis:   § 2   LEFT: Up to 50% stenosis of the proximal common iliac artery   The proximal and distal 3rd of the superficial femoral artery demonstrates 50-75% stenosis   Popliteal artery demonstrates greater than 75% stenosis in the P2 segment   Patent   three-vessel runoff extending into the left foot  § 3  RIGHT: Proximal common iliac artery stenosis of up to 50%   50% stenosis of the common femoral artery   Superficial femoral artery demonstrates up to 50% stenosis within the proximal several centimeters, 50-75% stenosis throughout the distal 3rd of   the vessel   Popliteal artery demonstrates scattered stenoses of 50-75%   Patent three-vessel runoff extending into the right foot    S/p abdominal aortogram,  LLE SFA shockwave angioplasty/FATOU 10/31/22   Post procedure  There was evidence of improvement in SHARIF in the left however on the right, SHARIF was noted to decrease to 0 51 (0 80)  CTA abd/pelvis w/ run off 11/1/22: Flow-limiting dissection flap at the prior arteriotomy site in the R CFA superimposed upon moderate to severe atherosclerotic disease  Focal severe atherosclerotic narrowing at the adductor canal with additional moderate atherosclerotic narrowing throughout the remainder of the R SFA    S/p R femoral endarterectomy 11/7  Will monitor post procedure in the ICU  Continue heparin drip  Continue atorvastatin 40 mg once daily, clopidogrel 45 mg once daily  Vascular is the primary       Acute respiratory failure with hypoxia [facility])  Assessment & Plan  After the procedure patient was noted to have desaturated to 85% SpO2, and needed 6 L via face mask to saturate greater than 90%  Likely due to post procedure edema/sedation  Continue to monitor and wean off    Acute on chronic anemia  Assessment & Plan  Hemoglobin fluctuating with baseline around 10  S/p 1 unit transfused 11/3/2022, H/H improved and has remained stable ever since  FOBT neg, no current source of bleeding, groin without hematoma  Continue to monitor H/H    NYDIA (acute kidney injury) [facility])  Assessment & Plan  Lab Results   Component Value Date    CREATININE 1 21 11/07/2022    CREATININE 1 40 (H) 11/07/2022    CREATININE 1 37 (H) 11/06/2022       Lab Results   Component Value Date    EGFR 48 11/07/2022    EGFR 40 11/07/2022    EGFR 41 11/06/2022       • Patient admitted on 10/22 POA1 44 bl <1 1  Nephrology was following    Creatinine  Has trended down   • Prerenal secondary to sepsis secondary to UTI, use of Ace inhibitor  • Appears to be resolved and currently you around baseline levels    Plan:  • UA w/ microscopic, Urinary retention protocol, Bladder scan, Daily weights, I/O  • Continue sodium chloride infusion 75 mL/hour  • Monitor BMP daily and observe for downward trend of creatinine  • Avoid hypoperfusion of the kidneys, minimize nephrotoxins  • RAAS Blockers/Diuretics held:  Patient's home lisinopril is being held      Benign essential hypertension  Assessment & Plan  Vitals:    11/07/22 1609 11/07/22 1624 11/07/22 1700 11/07/22 1715   BP: (!) 178/79 (!) 174/73     Pulse: 72 72 74 72   Resp: 17 20 14 14   Temp:       TempSrc:       SpO2: 94%  92% 95%   Weight:       Height:       Patient's blood pressure has been running high in the 180s, 190s SBP  Patient's home dose of amlodipine 10 mg, lisinopril 40 mg, metoprolol succinate 25 mg daily  Lisinopril was held due to NYDIA  Plan:  Continue amlodipine 10 mg once daily, continue Coreg 25 mg b i d  Continue hydralazine 10 mg every 6 hours p r n  For SBP greater than 180  Patient also has Cardizem drip to be titrated for SBP less than 160 per vascular    Depression, recurrent (HCC)  Assessment & Plan  Continue sertraline 100 mg daily, continue alprazolam 0 5 mg every 8 hours p r n  For anxiety      ------------------------------------------------------------------------------------------------------------  Chief Complaint:  Post femoral endarterectomy right 11/7  History of Present Illness   HX and PE limited by:  Patient is sedated postprocedure  Erum Long is a 65 y o  female with past medical history of uncontrolled diabetes, hypertension, hyperlipidemia  She admitted that she was very poorly compliant with her medication  And was lost to follow-up with her PCP and endocrinologist   Patient has been having lower leg ulcers and seeing Podiatry as outpatient  Patient followed up with primary care on 10/21 with severe worsening of lower leg ulcer particularly in the left heel  She was subsequently admitted on 10/22 due to these lesions  She presented septic on admission with tachycardia and elevated WBC, which was secondary to UTI  ID was consulted, patient is status post 8 days of Zosyn  She also presented with a NYDIA POA with creatinine of 1 4 (baseline less than 1 1) which eventually trended down to baseline levels, Nephrology was following  Podiatry was consulted and thought that left heel ulcer did not need surgical intervention  CTA during this admission showed significant bilateral stenosis  Vascular was consulted and patient is status post left lower extremity SFA shockwave angioplasty and FATOU placement on 10/31  Afterwards there was noted to be worsening of SHARIF of the right lower extremity    CTA shows flow limiting dissection flap at the prior arteriotomy site in the right CFA along with severe atherosclerotic disease  Patient was then subsequently scheduled for right femoral endarterectomy 11/7  After which she was transferred to the ICU for further monitoring  In the ICU patient is looking comfortable, currently on 6 L via face mask, patient's vital signs appear stable  History obtained from chart review  -------------------------------------------------------------------------------------------------------------  Dispo: Continue Critical Care     Code Status: Level 1 - Full Code  --------------------------------------------------------------------------------------------------------------  Review of Systems    Review of systems was unable to be performed secondary to Patient is sedated    Physical Exam  Vitals and nursing note reviewed  Constitutional:       General: She is not in acute distress  Appearance: She is well-developed  She is obese  She is ill-appearing  Comments: Patient is sedated, 6 L Nasal cannula saturating 94%   HENT:      Head: Normocephalic and atraumatic  Nose: Nose normal       Mouth/Throat:      Mouth: Mucous membranes are moist    Eyes:      Conjunctiva/sclera: Conjunctivae normal    Cardiovascular:      Rate and Rhythm: Normal rate and regular rhythm  Heart sounds: No murmur heard  Pulmonary:      Effort: Pulmonary effort is normal  No respiratory distress  Breath sounds: Wheezing present  Abdominal:      Palpations: Abdomen is soft  Tenderness: There is no abdominal tenderness  Musculoskeletal:      Cervical back: Neck supple  Right lower leg: No edema  Left lower leg: No edema  Comments: Pulses palpable bilateral dorsal pedis, left weaker than right  Cold extremities   Skin:     General: Skin is warm and dry  Capillary Refill: Capillary refill takes less than 2 seconds  Neurological:      General: No focal deficit present  Mental Status: She is alert and oriented to person, place, and time     Psychiatric: Mood and Affect: Mood normal        --------------------------------------------------------------------------------------------------------------  Vitals:   Vitals:    11/07/22 1609 11/07/22 1624 11/07/22 1700 11/07/22 1715   BP: (!) 178/79 (!) 174/73     Pulse: 72 72 74 72   Resp: 17 20 14 14   Temp:       TempSrc:       SpO2: 94%  92% 95%   Weight:       Height:         Temp  Min: 96 3 °F (35 7 °C)  Max: 100 2 °F (37 9 °C)     Height: 5' 3 75" (161 9 cm)  Body mass index is 43 25 kg/m²      Laboratory and Diagnostics:  Results from last 7 days   Lab Units 11/07/22  1520 11/07/22  0437 11/06/22  0458 11/05/22  0541 11/04/22  0503 11/03/22  0445 11/02/22  0607   WBC Thousand/uL 13 88* 8 74 8 87 9 62 11 80* 10 85* 13 66*   HEMOGLOBIN g/dL 8 7* 7 0* 7 8* 8 0* 8 4* 6 8* 7 6*   HEMATOCRIT % 27 2* 22 8* 25 7* 25 8* 27 1* 23 1* 25 4*   PLATELETS Thousands/uL 157 266 266 277 284 296 316   NEUTROS PCT %  --   --  69 72 73 69 73   BANDS PCT % 2  --   --   --   --   --   --    MONOS PCT %  --   --  8 7 8 8 8   MONO PCT % 3*  --   --   --   --   --   --      Results from last 7 days   Lab Units 11/07/22  1520 11/07/22  0437 11/06/22  0458 11/05/22  0541 11/04/22  0503 11/03/22  0445 11/02/22  0607   SODIUM mmol/L 140 137 138 138 138 142 140   POTASSIUM mmol/L 4 3 3 8 3 8 5 0 3 8 3 6 3 7   CHLORIDE mmol/L 104 100 99 100 100 104 101   CO2 mmol/L 24 28 30 31 28 31 31   ANION GAP mmol/L 12 9 9 7 10 7 8   BUN mg/dL 19 19 19 18 17 15 15   CREATININE mg/dL 1 21 1 40* 1 37* 1 37* 1 38* 1 30 1 35*   CALCIUM mg/dL 7 5* 8 2* 8 6 8 5 8 8 8 4 9 0   GLUCOSE RANDOM mg/dL 207* 135 167* 139 117 74 83   ALT U/L 23  --   --   --   --   --   --    AST U/L 42  --   --   --   --   --   --    ALK PHOS U/L 209*  --   --   --   --   --   --    ALBUMIN g/dL 1 8*  --   --   --   --   --   --    TOTAL BILIRUBIN mg/dL 1 53*  --   --   --   --   --   --      Results from last 7 days   Lab Units 11/07/22  1520   MAGNESIUM mg/dL 1 7   PHOSPHORUS mg/dL 5 6*      Results from last 7 days   Lab Units 22  1520 22  0437 22  0458 22  1350 22  0541 22  2305 22  0607 22  2327   INR   --   --   --   --   --   --   --   --  1 14   PTT seconds >210* 52* 79* 72* 62* 49* 107*   < > 36    < > = values in this interval not displayed  ABG:    VBG:          Micro:        EKG:  Normal sinus rhythm  Imaging: I have personally reviewed pertinent reports  Historical Information   Past Medical History:   Diagnosis Date   • Anxiety    • Depression    • Diabetes ([facility] )    • Diabetes mellitus ([facility] )    • Esophageal reflux     2015 RESOLVED   • Hyperlipidemia    • Hypertension    • Low back pain     sciatica   • Pneumonia    • Stroke ([facility] ) 2015    small vessel, L frontal corona radiata   Rx asa 81, Lipitor 40, risk modification   • Vitamin D deficiency      Past Surgical History:   Procedure Laterality Date   • COLONOSCOPY     • IR AORTAGRAM WITH RUN-OFF  10/31/2022     Social History   Social History     Substance and Sexual Activity   Alcohol Use Never    Comment: OCCASIONAL ALCOHOL USE IN ALL SCRIPTS     Social History     Substance and Sexual Activity   Drug Use No     Social History     Tobacco Use   Smoking Status Former Smoker   • Types: Cigarettes   • Quit date:    • Years since quittin 8   Smokeless Tobacco Never Used     Exercise History:  None  Family History:   Family History   Problem Relation Age of Onset   • Diabetes Mother    • Lung cancer Mother    • Cancer Father    • Lung cancer Father    • Hypertension Brother    • Hypertension Family      Family history unknown      Medications:  Current Facility-Administered Medications   Medication Dose Route Frequency   • acetaminophen (TYLENOL) tablet 650 mg  650 mg Oral Q6H PRN   • albuterol (PROVENTIL HFA,VENTOLIN HFA) inhaler 2 puff  2 puff Inhalation Q4H PRN   • amLODIPine (NORVASC) tablet 10 mg  10 mg Oral Daily   • atorvastatin (LIPITOR) tablet 40 mg  40 mg Oral Daily With Dinner   • carvedilol (COREG) tablet 25 mg  25 mg Oral BID With Meals   • clopidogrel (PLAVIX) tablet 75 mg  75 mg Oral Daily   • fentaNYL (SUBLIMAZE) injection 25 mcg  25 mcg Intravenous Q5 Min PRN   • gabapentin (NEURONTIN) capsule 200 mg  200 mg Oral BID   • heparin (porcine) 25,000 units in 0 45% NaCl 250 mL infusion (premix)  3-30 Units/kg/hr (Order-Specific) Intravenous Titrated   • heparin (porcine) injection 4,400 Units  4,400 Units Intravenous Q1H PRN   • heparin (porcine) injection 8,800 Units  8,800 Units Intravenous Q1H PRN   • hydrALAZINE (APRESOLINE) injection 10 mg  10 mg Intravenous Q6H PRN   • HYDROmorphone (DILAUDID) injection 0 5 mg  0 5 mg Intravenous Q10 Min PRN   • insulin glargine (LANTUS) subcutaneous injection 15 Units 0 15 mL  15 Units Subcutaneous HS   • insulin lispro (HumaLOG) 100 units/mL subcutaneous injection 1-6 Units  1-6 Units Subcutaneous Q6H [address]   • LORazepam (ATIVAN) tablet 0 5 mg  0 5 mg Oral Q8H PRN   • metoclopramide (REGLAN) injection 5 mg  5 mg Intravenous Q6H PRN   • morphine injection 4 mg  4 mg Intravenous Q4H PRN   • niCARdipine (CARDENE) 25 mg (STANDARD CONCENTRATION) in sodium chloride 0 9% 250 mL  1-15 mg/hr Intravenous Titrated   • nystatin (MYCOSTATIN) powder   Topical BID   • ondansetron (ZOFRAN) injection 4 mg  4 mg Intravenous Q6H PRN   • ondansetron (ZOFRAN) injection 4 mg  4 mg Intravenous Once PRN   • oxyCODONE (ROXICODONE) IR tablet 5 mg  5 mg Oral Q4H PRN    Or   • oxyCODONE (ROXICODONE) immediate release tablet 10 mg  10 mg Oral Q4H PRN   • pantoprazole (PROTONIX) EC tablet 40 mg  40 mg Oral Early Morning   • sertraline (ZOLOFT) tablet 100 mg  100 mg Oral Daily   • sodium chloride 0 9 % infusion  75 mL/hr Intravenous Continuous     Home medications:  Prior to Admission Medications   Prescriptions Last Dose Informant Patient Reported? Taking?    ALPRAZolam (XANAX) 0 25 mg tablet   No Yes   Sig: Take 1 tablet (0 25 mg total) by mouth daily as needed for anxiety (panic attacks)   Cholecalciferol (Vitamin D-3) 25 MCG (1000 UT) CAPS Not Taking at Unknown time  Yes No   Sig: Take 2,000 Units by mouth daily   Patient not taking: Reported on 10/21/2022   Continuous Blood Gluc  (FreeStyle Noé 14 Day Randle) Kaya Rutledge   No No   Sig: Use 1 Units continuous   Patient not taking: Reported on 10/21/2022   Continuous Blood Gluc Sensor (FreeStyle Noé 14 Day Sensor) MISC Not Taking at Unknown time  No No   Sig: Use daily as directed for CGM - Change every 14 days   Patient not taking: No sig reported   Doxylamine Succinate, Sleep, (UNISOM PO) Not Taking at Unknown time  Yes No   Sig: Take by mouth   Patient not taking: No sig reported   Insulin Pen Needle (B-D UF III MINI PEN NEEDLES) 31G X 5 MM MISC   No No   Sig: USE WITH INSULIN FOUR TIMES DAILY   Lancets 28G MISC Not Taking at Unknown time  No No   Sig: Use 1 each 4 (four) times a day   Patient not taking: No sig reported   Multiple Vitamin (multivitamin) tablet   Yes Yes   Sig: Take 1 tablet by mouth daily   Trulicity 6 95 JV/0 7GM SOPN   No Yes   Sig: INJECT 0 5 ML (0 75 MG TOTAL) UNDER THE SKIN ONCE A WEEK TUESDAY   amLODIPine (NORVASC) 10 mg tablet   No Yes   Sig: TAKE 1 TABLET BY MOUTH EVERY DAY   aspirin 81 mg chewable tablet  Self Yes Yes   Sig: Chew 81 mg   atorvastatin (LIPITOR) 40 mg tablet   No Yes   Sig: TAKE 1 TABLET BY MOUTH EVERY DAY   clotrimazole-betamethasone (LOTRISONE) 1-0 05 % cream   No Yes   Sig: Apply topically 2 (two) times a day   fluocinonide (LIDEX) 0 05 % ointment   No Yes   Sig: Apply topically 2 (two) times a day   gabapentin (NEURONTIN) 100 mg capsule   No Yes   Sig: TAKE 2 CAPSULES BY MOUTH TWICE A DAY   glucose blood (True Metrix Blood Glucose Test) test strip Not Taking at Unknown time  No No   Sig: Use 1 each 4 (four) times a day   Patient not taking: No sig reported   insulin aspart (NovoLOG FlexPen) 100 UNIT/ML injection pen No Yes   Sig: Inject 20 Units under the skin 3 (three) times a day with meals   insulin glargine (Lantus SoloStar) 100 units/mL injection pen   No Yes   Sig: Inject 60 Units under the skin daily at bedtime   lisinopril (ZESTRIL) 40 mg tablet   No Yes   Sig: TAKE 1 TABLET BY MOUTH EVERY DAY   metoprolol succinate (TOPROL-XL) 25 mg 24 hr tablet   No Yes   Sig: TAKE 1 TABLET (25 MG TOTAL) BY MOUTH DAILY  pantoprazole (PROTONIX) 40 mg tablet   No No   Sig: Take 1 tablet (40 mg total) by mouth daily for 14 days   potassium chloride (K-DUR,KLOR-CON) 10 mEq tablet   No No   Sig: Take 2 tablets (20 mEq total) by mouth 2 (two) times a day for 2 doses   sertraline (ZOLOFT) 100 mg tablet   No Yes   Sig: TAKE 1 TABLET BY MOUTH EVERY DAY      Facility-Administered Medications: None     Allergies:  No Known Allergies  ------------------------------------------------------------------------------------------------------------  Advance Directive and Living Will:      Power of :    POLST:    ------------------------------------------------------------------------------------------------------------  Care Time Delivered:   No Critical Care time spent       Stefanie Ochoa MD        Portions of the record may have been created with voice recognition software  Occasional wrong word or "sound a like" substitutions may have occurred due to the inherent limitations of voice recognition software    Read the chart carefully and recognize, using context, where substitutions have occurred

## 2022-11-07 NOTE — ASSESSMENT & PLAN NOTE
After the procedure patient was noted to have desaturated to 85% SpO2, and needed 6 L via face mask to saturate greater than 90%  Likely due to post procedure edema/sedation  Continue to monitor and wean off

## 2022-11-07 NOTE — ASSESSMENT & PLAN NOTE
Patient has uncontrolled diabetes, hypertension, hyperlipidemia, patient was lost to follow-up and has not seen endocrinologist for over a year  Admits that she is poorly compliant with medication  Admitted due to Worsening lower extremity wounds  · CTA abd/pelvis:   § 2   LEFT: Up to 50% stenosis of the proximal common iliac artery   The proximal and distal 3rd of the superficial femoral artery demonstrates 50-75% stenosis   Popliteal artery demonstrates greater than 75% stenosis in the P2 segment   Patent   three-vessel runoff extending into the left foot  § 3  RIGHT: Proximal common iliac artery stenosis of up to 50%   50% stenosis of the common femoral artery   Superficial femoral artery demonstrates up to 50% stenosis within the proximal several centimeters, 50-75% stenosis throughout the distal 3rd of   the vessel   Popliteal artery demonstrates scattered stenoses of 50-75%   Patent three-vessel runoff extending into the right foot    S/p abdominal aortogram,  LLE SFA shockwave angioplasty/FATOU 10/31/22   Post procedure  There was evidence of improvement in SHARIF in the left however on the right, SHARIF was noted to decrease to 0 51 (0 80)  CTA abd/pelvis w/ run off 11/1/22: Flow-limiting dissection flap at the prior arteriotomy site in the R CFA superimposed upon moderate to severe atherosclerotic disease  Focal severe atherosclerotic narrowing at the adductor canal with additional moderate atherosclerotic narrowing throughout the remainder of the R SFA    S/p R femoral endarterectomy 11/7  Will monitor post procedure in the ICU  Continue heparin drip  Continue atorvastatin 40 mg once daily, clopidogrel 45 mg once daily  Vascular is the primary

## 2022-11-07 NOTE — NURSING NOTE
No clear time noted or directions given regarding holding Heparin drip for procedure tomorrow 11/07/22  Night shift RN informed to have day shift RN ask providers when/if stop drip      Ophelia Shi 11/6/2022 7:20 PM

## 2022-11-07 NOTE — PHYSICAL THERAPY NOTE
Physical Therapy Cancellation Note:    Pt currently in the OR for R femoral endarterectomy  Cancel PT tx session for today and will cont to follow as able

## 2022-11-07 NOTE — OP NOTE
OPERATIVE REPORT  PATIENT NAME: Lionel Mayes    :  1960  MRN: [id number]  Pt Location: [place] [facility] 09    SURGERY DATE: 2022    Surgeon(s) and Role:     * Raegan Peralta MD - Primary     * Rashida Powell MD - Assisting     * Jose Gaspar MD - [id number] Nicolette Naranjo DO - Fellow    Preop Diagnosis:  PAD (peripheral artery disease) [facility]) [I73 9]  Stenosis of femoral artery ([address] ) [I70 209]  Wound of right lower extremity, initial encounter [S81 801A]    Post-Op Diagnosis Codes:     * PAD (peripheral artery disease) ([address] ) [I73 9]     * Stenosis of femoral artery ([address] ) [I70 209]     * Wound of right lower extremity, initial encounter [S82 801A]    Procedure(s) (LRB):  -Right lower extremity angiogram   -Right CFA balloon angioplasty with [id number]  [employer]    -Right CFA DCB with 6x40 Bard Lutonix   -Right CFA atherectomy with Jetstream and distal embolization protection with 7mm Spider FX   -Right SFA/Pop angioplasty with 4x40 Chololate balloon   -Right SFA/Pop DCB with 5x150 Bard Lutonix   -Right SFA stent with 6x80 [address]  [employer] x2   -Right PT/Pop angioplasty with [id number]    -Right distal pop angioplsty with 4x40 Bard Lutonix DCB (Right)    Specimen(s):  * No specimens in log *    Estimated Blood Loss:   Minimal    Drains:  Urethral Catheter Latex 16 Fr  (Active)   Number of days: 0       Anesthesia Type:   * No anesthesia type entered *    Operative Indications:  PAD (peripheral artery disease) (HCC) [I73 9]  Stenosis of femoral artery (HCC) [I70 209]  Wound of right lower extremity, initial encounter [S87 801A]  This is a 46-year-old female with significant peripheral arterial disease, diabetes mellitus, hypertension, CKD, CVA, obesity with a BMI of 43 presented with bilateral foot wounds    Patient is noted to have arterial insufficiency bilaterally had undergone a left lower extremity angiogram via IR through right groin puncture the puncture site had been closed with a Perclose which unfortunately had changed her SHARIF on the right leg  A CTA was done that showed a near occlusion of the access site  Patient has been medically assistant go to the operating room today for a endovascular versus open procedure for revascularization of the right leg    Operative Findings:  -Near occlusions noted in the common femoral artery just proximal to the bifurcation  This had been balloon angioplastied with DCB as well as atherectomied  There was significant improvement in flow however there was noted to be a non flow limiting dissection flap in that region   -the right SFA and popliteal artery had significant disease which required balloon angioplasty as well  Two 6 x 80 zachariah stents replaced within the right SFA  -the distal right popliteal artery also required balloon angioplasty with DCB  -to shower embolization of particulate matter within the pop and to portal trunk removed via aspiration via catheter and then balloon angioplastied with resolution    Complications:   Shower embolization particulate matter into the pop/TP trunk Retrieve and resolved with suction thrombectomy and balloon angioplasty    Procedure and Technique:  After informed consent was obtained, the patient was brought to the operating room and placed in the supine position  She was given anesthesia and endotracheally intubated  She was given IV antibiotics  She was prepped and draped in the usual sterile fashion, exposing the bilateral groins and circumferentially prepping out the right leg  A timeout was performed  Fluoroscopy was used to locate the level of the femoral heads  Under direct ultrasound guidance, the Left common femoral artery was accessed with an access needle  Micropuncture sheath was placed in a sheath shot was done showing good access point  Then placed a Bentson wire into the infrarenal aorta and placed a 5 Lao sheath into the left groin    We used Omniflush catheter to do a CO2 angiogram of the infrarenal aorta and cannulated the contralateral iliac artery using a Glidewire  Glidewire was placed into the common femoral artery and a glide catheter was placed up and over the bifurcation  We did our angiogram of the right lower extremity this showed a significant stenosis of the right common femoral artery as expected with reconstitution distally on flow into the profunda and SFA  We were able to pass lesion using a Glidewire and Glide catheter  We then exchanged our Glidewire for a Bentson wire  We placed a 6 x 55 sheath up and over the bifurcation into the right external iliac artery  We then took a V18 wire and cannulated the profunda for protection  At this point we decided to balloon angioplasty the common femoral artery where the access site had been using a 5 x 20 [place] Zeebo   This did resolve it to some extent however there was a dissection flap that was seen  We then used a 6 x 40 Lutonix drug coated balloon in the common femoral artery  Patient at this point had been heparinized as well  We did a prolonged insufflation 3 minutes informed angiogram again  This then showed significant improvement and flow past the common artery  We then removed the V18 wire from the profunda and with the dilator advanced our sheath into the superficial femoral artery and performed our angiogram of the right lower extremity  There was a significant disease of the superficial femoral artery we again used a Glidewire and Glide catheter to traverse the SFA were able to do this without much difficulty and were able to get our wire into the distal pop  We confirmed within true lumen performed angiogram through the catheter  We then exchanged our wire for an 0 014 glide Advantage and performed angioplasty starting with the popliteal artery and sequentially insufflating up the superficial femoral artery using a 4x40 chocolate balloon    We then ballooned the long segment of the SFA and popliteal artery using a 5x150 Lutonix drug coated balloon  Unfortunately was still disease within the superficial femoral artery so two 6 x 80 zachariah stents replaced and then post dilated with a 5 mm balloon  At this point we performed our angiogram the right lower extremity again which showed sluggish flow throughout the arterial system  This was concerning for a inflow problem  We performed our angiogram of the right common femoral artery again which again showed a dissection flap the seem to be flow limiting  We balloon angioplastied this again which did not resolve the issue  We then at this point used the jet stream atherectomy device after placing a 7 mm spider FX for a distal embolization protection  We made multiple passes with the Jetstream within the common femoral artery and again balloon angioplastied this did show some improvement however there was still some sluggish flow so we decided to make multiple passes again with the jet stream   We then performed angiogram that showed a significant improvement this point and I would performed our angiogram of the right lower extremity that now showed that there was showering of some particulate matter likely from the [facility]   We placed a catheter within the tibioperoneal trunk and performed a suction atherectomy we should remove some particulate matter we then balloon angioplastied the PT, TPT, popliteal artery with a 3x 220 balloon and then balloon angioplastied the popliteal 1 further time with a 4 mm balloon this did resolve the issue that was seen with particular matter and showed patency throughout the vessels  Unfortunately was still some sluggish flow throughout the right arterial system however this was improved compared to earlier  We performed our angiogram of the right common femoral artery again which showed a non flow limiting dissection and patency of the profunda and SFA    At this point we felt as though we exhausted all endovascular options and there was a resolution of her original issue which was the near occlusion of the common femoral artery  We will plan on keeping the patient heparinized and on antiplatelet therapy and she will go to the ICU postoperatively for close monitoring  This was all in attempts to avoid a right groin incision which would likely end up having significant wound healing issues and potential infection given her body habitus and active cutaneous fungal infection of the groin       I was present for the entire procedure    Patient Disposition:  Critical Care Unit        SIGNATURE: Carrol Marroquin MD  DATE: November 7, 2022  TIME: 3:16 PM        Vascular Quality Initiative - Peripheral Vascular Intervention     Urgency: Urgent    Functional Status:  Restricted in physically strenuous activity but ambulatory and able to carry out work of a light or sedentary nature  Ambulation: Amb = independently ambulatory     Leg Symptoms    Right: Ulcer/necrosis (gangrene): de candice tissue loss due to peripheral arterial disease, not due to non-healing prior amputation       Tissue Loss Severity: Grade 1, Shallow = small shallow ulcer(s) on distal leg or foot, any exposed bone is only limited to distal phalanx (ie, minor tissue loss: limb salvage possible with simple digital amputation [1 or 2 digits], or skin coverage)  Infection: Grade 0, None = No symptoms or signs of infection  Left: Ulcer/necrosis (gangrene): de candice tissue loss due to peripheral arterial disease, not due to non-healing prior amputation       Tissue Loss Severity: Grade 2, Deep = deeper full thickness ulcer or necrosis (gangrene) on distal leg or foot with exposed bone, joint, or tendon, or shallow heel ulcer without involvement of the calcaneus (ie, major tissue loss: salvageable with 3 digital amputations or standard transmetatarsal amputation [TMA] plus skin coverage)       Infection: Grade 0, None = No symptoms or signs of infection  COVID Information  COVID Symptoms Pre-Procedure: Asymptomatic    Treatment Delayed by Pandemic: None    Access   Number of Sites: 1     Access Site 1:     Side 1: Left    Site 1: Femoral Retrograde    Access Guidance 1:U/S    Largest Sheath Size 1: 7 Fr  Closure Device 1: MynxGrip      Number of Closure Devices: 1     Closure Device Outcome: Closure device successful         Procedure  Fluoro Time: 87 2 minutes  Contrast Volume: Visipaque 176 ml  DAP: 643 8 Gy cm2  CO2: yes  Anticoagulant: Heparin  Protamine: No  If Creatinine is > 1 2 or missing, SANTOS Prophylaxis IV saline     Treatment Details  Indication: Occlusive Disease,    Completion Assessment  Artery 1 treated:  Com Fem  Right               Outflow: SFA, PROF, POP: 3                  Was this Site previously treated?: No          TASC Grade: B          Total Treated Length: 5 cm          Total Occluded Length: 0 cm          Calcification: Focal (calcification on one side of artery < half length of lesion)          Number of Treatment types (Devices):   3           Device 1          Treatment Type: Plain Balloon         Device 2          Treatment Type: Special Balloon,  Drug Coated Balloon                Diameter: 6 mm          Length: 40 mm              Device 3          Treatment Type:  Atherectomy                Type:  Jet Stream          Length: 7 mm            Concomitant: None          Technical result: Successful (stenosis <=30%)      Artery 2 treated: SFA        Right               Outflow: AT,PT,Peroneal: 3                  Was this Site previously treated?: No          TASC Grade: B          Total Treated Length: 15 cm           Total Occluded Length: 5 cm          Calcification: Severe (calcification on both sides of artery > half length of lesion)          Number of Treatment types (Devices):   3           Device 1          Treatment Type: Plain Balloon         Device 2          Treatment Type: Special Balloon,  Drug Coated Balloon                Diameter: 5 mm          Length: 150 mm              Device 3          Treatment Type: Stent,  Drug Eluting Stent    x2          Diameter: 6 mm          Length: 80 mm            Concomitant: None          Technical result: Successful (stenosis <=30%)      Artery 3 treated: Popliteal   Right               Outflow: AT,PT,Peroneal: 3             Segments treated: P1+P2+P3                       Was this Site previously treated?: No          TASC Grade: B          Total Treated Length: 5 cm           Total Occluded Length: 0 cm          Calcification: Moderate (calcification on both sides of artery < half length of lesion)          Number of Treatment types (Devices): 2           Device 1          Treatment Type: Plain Balloon         Device 2          Treatment Type: Special Balloon,  Drug Coated Balloon                Diameter: 4 mm          Length: 60 mm            Concomitant: None          Technical result: Successful (stenosis <=30%)      None       Post Procedure  Patient currently taking: Statin, Yes      Antiplatelet Medication, Yes    Procedure Complications: Yes   Complications:  Other  embolization  Myocardial Infarction: No  Thrombosis: {No  Embolization:  Interventional  Target Lesion Dissection: Interventional  Remote Dissection: No  Perforation: No  Planned Amputation: No    Target Lesion Thrombosis by Artery   Artery 1: No   Artery 2: No   Artery 3: No   Artery 4: No    Target Lesion Dissection by Artery:   Artery 1: Interventional   Artery 2: No   Artery 3: No   Artery 4: No     Access Site Complications/Treatments: 1     Access Site 1:     Hematoma: No    Stenosis/Occlusion: No (patent)    Infection: No    Pseudoaneurysm: No    AV Fistula: No

## 2022-11-07 NOTE — ANESTHESIA POSTPROCEDURE EVALUATION
Post-Op Assessment Note    No complications documented      BP     Temp      Pulse     Resp      SpO2

## 2022-11-07 NOTE — ASSESSMENT & PLAN NOTE
Vitals:    11/07/22 1609 11/07/22 1624 11/07/22 1700 11/07/22 1715   BP: (!) 178/79 (!) 174/73     Pulse: 72 72 74 72   Resp: 17 20 14 14   Temp:       TempSrc:       SpO2: 94%  92% 95%   Weight:       Height:       Patient's blood pressure has been running high in the 180s, 190s SBP  Patient's home dose of amlodipine 10 mg, lisinopril 40 mg, metoprolol succinate 25 mg daily  Lisinopril was held due to NYDIA  Plan:  Continue amlodipine 10 mg once daily, continue Coreg 25 mg b i d  Continue hydralazine 10 mg every 6 hours p r n   For SBP greater than 180  Patient also has Cardizem drip to be titrated for SBP less than 160 per vascular

## 2022-11-08 ENCOUNTER — APPOINTMENT (INPATIENT)
Dept: RADIOLOGY | Facility: HOSPITAL | Age: 62
DRG: 853 | End: 2022-11-08
Payer: COMMERCIAL

## 2022-11-08 ENCOUNTER — VBI (OUTPATIENT)
Dept: ADMINISTRATIVE | Facility: OTHER | Age: 62
End: 2022-11-08

## 2022-11-08 PROBLEM — R11.2 N&V (NAUSEA AND VOMITING): Status: RESOLVED | Noted: 2022-10-22 | Resolved: 2022-11-08

## 2022-11-08 PROBLEM — N17.9 AKI (ACUTE KIDNEY INJURY) (HCC): Status: RESOLVED | Noted: 2022-10-25 | Resolved: 2022-11-08

## 2022-11-08 PROBLEM — A41.9 SEPSIS (HCC): Status: RESOLVED | Noted: 2022-10-22 | Resolved: 2022-11-08

## 2022-11-08 PROBLEM — N39.0 UTI (URINARY TRACT INFECTION): Status: RESOLVED | Noted: 2022-10-23 | Resolved: 2022-11-08

## 2022-11-08 LAB
ANION GAP SERPL CALCULATED.3IONS-SCNC: 11 MMOL/L (ref 4–13)
APTT PPP: 110 SECONDS (ref 23–37)
APTT PPP: 55 SECONDS (ref 23–37)
APTT PPP: 66 SECONDS (ref 23–37)
BUN SERPL-MCNC: 19 MG/DL (ref 5–25)
CALCIUM SERPL-MCNC: 7.4 MG/DL (ref 8.3–10.1)
CHLORIDE SERPL-SCNC: 104 MMOL/L (ref 96–108)
CO2 SERPL-SCNC: 24 MMOL/L (ref 21–32)
CREAT SERPL-MCNC: 1.2 MG/DL (ref 0.6–1.3)
ERYTHROCYTE [DISTWIDTH] IN BLOOD BY AUTOMATED COUNT: 15.7 % (ref 11.6–15.1)
GFR SERPL CREATININE-BSD FRML MDRD: 48 ML/MIN/1.73SQ M
GLUCOSE SERPL-MCNC: 169 MG/DL (ref 65–140)
GLUCOSE SERPL-MCNC: 185 MG/DL (ref 65–140)
GLUCOSE SERPL-MCNC: 192 MG/DL (ref 65–140)
GLUCOSE SERPL-MCNC: 202 MG/DL (ref 65–140)
GLUCOSE SERPL-MCNC: 209 MG/DL (ref 65–140)
GLUCOSE SERPL-MCNC: 216 MG/DL (ref 65–140)
GLUCOSE SERPL-MCNC: 235 MG/DL (ref 65–140)
HCT VFR BLD AUTO: 28.7 % (ref 34.8–46.1)
HGB BLD-MCNC: 8.8 G/DL (ref 11.5–15.4)
KCT BLD-ACNC: 212 SEC (ref 89–137)
MCH RBC QN AUTO: 27.5 PG (ref 26.8–34.3)
MCHC RBC AUTO-ENTMCNC: 30.7 G/DL (ref 31.4–37.4)
MCV RBC AUTO: 90 FL (ref 82–98)
PLATELET # BLD AUTO: 105 THOUSANDS/UL (ref 149–390)
PMV BLD AUTO: 9.3 FL (ref 8.9–12.7)
POTASSIUM SERPL-SCNC: 3.6 MMOL/L (ref 3.5–5.3)
RBC # BLD AUTO: 3.2 MILLION/UL (ref 3.81–5.12)
SARS-COV-2 RNA RESP QL NAA+PROBE: NEGATIVE
SODIUM SERPL-SCNC: 139 MMOL/L (ref 135–147)
SPECIMEN SOURCE: ABNORMAL
WBC # BLD AUTO: 16.57 THOUSAND/UL (ref 4.31–10.16)

## 2022-11-08 PROCEDURE — 99233 SBSQ HOSP IP/OBS HIGH 50: CPT | Performed by: INTERNAL MEDICINE

## 2022-11-08 PROCEDURE — 99232 SBSQ HOSP IP/OBS MODERATE 35: CPT | Performed by: SURGERY

## 2022-11-08 PROCEDURE — 97535 SELF CARE MNGMENT TRAINING: CPT

## 2022-11-08 PROCEDURE — 82948 REAGENT STRIP/BLOOD GLUCOSE: CPT

## 2022-11-08 PROCEDURE — 85027 COMPLETE CBC AUTOMATED: CPT

## 2022-11-08 PROCEDURE — 85730 THROMBOPLASTIN TIME PARTIAL: CPT | Performed by: INTERNAL MEDICINE

## 2022-11-08 PROCEDURE — 99232 SBSQ HOSP IP/OBS MODERATE 35: CPT | Performed by: INTERNAL MEDICINE

## 2022-11-08 PROCEDURE — 80048 BASIC METABOLIC PNL TOTAL CA: CPT | Performed by: SURGERY

## 2022-11-08 PROCEDURE — U0003 INFECTIOUS AGENT DETECTION BY NUCLEIC ACID (DNA OR RNA); SEVERE ACUTE RESPIRATORY SYNDROME CORONAVIRUS 2 (SARS-COV-2) (CORONAVIRUS DISEASE [COVID-19]), AMPLIFIED PROBE TECHNIQUE, MAKING USE OF HIGH THROUGHPUT TECHNOLOGIES AS DESCRIBED BY CMS-2020-01-R: HCPCS | Performed by: INTERNAL MEDICINE

## 2022-11-08 PROCEDURE — U0005 INFEC AGEN DETEC AMPLI PROBE: HCPCS | Performed by: INTERNAL MEDICINE

## 2022-11-08 PROCEDURE — 85730 THROMBOPLASTIN TIME PARTIAL: CPT

## 2022-11-08 PROCEDURE — 71045 X-RAY EXAM CHEST 1 VIEW: CPT

## 2022-11-08 PROCEDURE — 99232 SBSQ HOSP IP/OBS MODERATE 35: CPT | Performed by: PODIATRIST

## 2022-11-08 RX ORDER — INSULIN LISPRO 100 [IU]/ML
2-12 INJECTION, SOLUTION INTRAVENOUS; SUBCUTANEOUS EVERY 6 HOURS SCHEDULED
Status: DISCONTINUED | OUTPATIENT
Start: 2022-11-08 | End: 2022-11-08

## 2022-11-08 RX ORDER — LISINOPRIL 20 MG/1
40 TABLET ORAL DAILY
Status: DISCONTINUED | OUTPATIENT
Start: 2022-11-08 | End: 2022-11-09

## 2022-11-08 RX ORDER — INSULIN GLARGINE 100 [IU]/ML
25 INJECTION, SOLUTION SUBCUTANEOUS
Status: DISCONTINUED | OUTPATIENT
Start: 2022-11-08 | End: 2022-11-13

## 2022-11-08 RX ORDER — FUROSEMIDE 10 MG/ML
40 INJECTION INTRAMUSCULAR; INTRAVENOUS ONCE
Status: COMPLETED | OUTPATIENT
Start: 2022-11-08 | End: 2022-11-08

## 2022-11-08 RX ORDER — INSULIN LISPRO 100 [IU]/ML
2-12 INJECTION, SOLUTION INTRAVENOUS; SUBCUTANEOUS
Status: DISCONTINUED | OUTPATIENT
Start: 2022-11-08 | End: 2022-11-14

## 2022-11-08 RX ADMIN — ACETAMINOPHEN 650 MG: 325 TABLET, FILM COATED ORAL at 19:59

## 2022-11-08 RX ADMIN — SERTRALINE HYDROCHLORIDE 100 MG: 100 TABLET ORAL at 09:15

## 2022-11-08 RX ADMIN — SODIUM CHLORIDE 5 MG/HR: 0.9 INJECTION, SOLUTION INTRAVENOUS at 09:38

## 2022-11-08 RX ADMIN — PANTOPRAZOLE SODIUM 40 MG: 40 TABLET, DELAYED RELEASE ORAL at 05:31

## 2022-11-08 RX ADMIN — GABAPENTIN 200 MG: 100 CAPSULE ORAL at 17:09

## 2022-11-08 RX ADMIN — SODIUM CHLORIDE 10 MG/HR: 0.9 INJECTION, SOLUTION INTRAVENOUS at 02:28

## 2022-11-08 RX ADMIN — CARVEDILOL 25 MG: 25 TABLET, FILM COATED ORAL at 16:51

## 2022-11-08 RX ADMIN — AMLODIPINE BESYLATE 10 MG: 10 TABLET ORAL at 09:15

## 2022-11-08 RX ADMIN — LORAZEPAM 0.5 MG: 0.5 TABLET ORAL at 05:30

## 2022-11-08 RX ADMIN — INSULIN LISPRO 2 UNITS: 100 INJECTION, SOLUTION INTRAVENOUS; SUBCUTANEOUS at 13:32

## 2022-11-08 RX ADMIN — CARVEDILOL 25 MG: 25 TABLET, FILM COATED ORAL at 09:15

## 2022-11-08 RX ADMIN — OXYCODONE 5 MG: 5 TABLET ORAL at 14:55

## 2022-11-08 RX ADMIN — HEPARIN SODIUM 4400 UNITS: 1000 INJECTION INTRAVENOUS; SUBCUTANEOUS at 21:40

## 2022-11-08 RX ADMIN — ONDANSETRON 4 MG: 2 INJECTION INTRAMUSCULAR; INTRAVENOUS at 09:16

## 2022-11-08 RX ADMIN — INSULIN LISPRO 3 UNITS: 100 INJECTION, SOLUTION INTRAVENOUS; SUBCUTANEOUS at 00:44

## 2022-11-08 RX ADMIN — GABAPENTIN 200 MG: 100 CAPSULE ORAL at 09:16

## 2022-11-08 RX ADMIN — INSULIN LISPRO 2 UNITS: 100 INJECTION, SOLUTION INTRAVENOUS; SUBCUTANEOUS at 05:39

## 2022-11-08 RX ADMIN — OXYCODONE 5 MG: 5 TABLET ORAL at 00:42

## 2022-11-08 RX ADMIN — FUROSEMIDE 40 MG: 10 INJECTION, SOLUTION INTRAMUSCULAR; INTRAVENOUS at 13:32

## 2022-11-08 RX ADMIN — ATORVASTATIN CALCIUM 40 MG: 40 TABLET, FILM COATED ORAL at 16:51

## 2022-11-08 RX ADMIN — HEPARIN SODIUM 18 UNITS/KG/HR: 10000 INJECTION, SOLUTION INTRAVENOUS at 23:36

## 2022-11-08 RX ADMIN — INSULIN GLARGINE 25 UNITS: 100 INJECTION, SOLUTION SUBCUTANEOUS at 21:33

## 2022-11-08 RX ADMIN — OXYCODONE 5 MG: 5 TABLET ORAL at 19:59

## 2022-11-08 RX ADMIN — LORAZEPAM 0.5 MG: 0.5 TABLET ORAL at 21:33

## 2022-11-08 RX ADMIN — LISINOPRIL 40 MG: 20 TABLET ORAL at 13:32

## 2022-11-08 RX ADMIN — SODIUM CHLORIDE 75 ML/HR: 0.9 INJECTION, SOLUTION INTRAVENOUS at 02:31

## 2022-11-08 RX ADMIN — CLOPIDOGREL BISULFATE 75 MG: 75 TABLET ORAL at 09:16

## 2022-11-08 RX ADMIN — SODIUM CHLORIDE 12.5 MG/HR: 0.9 INJECTION, SOLUTION INTRAVENOUS at 00:03

## 2022-11-08 RX ADMIN — HEPARIN SODIUM 16 UNITS/KG/HR: 10000 INJECTION, SOLUTION INTRAVENOUS at 09:41

## 2022-11-08 RX ADMIN — INSULIN LISPRO 4 UNITS: 100 INJECTION, SOLUTION INTRAVENOUS; SUBCUTANEOUS at 09:18

## 2022-11-08 RX ADMIN — NYSTATIN: 100000 POWDER TOPICAL at 17:09

## 2022-11-08 RX ADMIN — INSULIN LISPRO 2 UNITS: 100 INJECTION, SOLUTION INTRAVENOUS; SUBCUTANEOUS at 17:10

## 2022-11-08 RX ADMIN — MORPHINE SULFATE 4 MG: 4 INJECTION INTRAVENOUS at 17:02

## 2022-11-08 RX ADMIN — NYSTATIN: 100000 POWDER TOPICAL at 09:19

## 2022-11-08 NOTE — ASSESSMENT & PLAN NOTE
Patient has uncontrolled diabetes, hypertension, hyperlipidemia, patient was lost to follow-up and has not seen endocrinologist for over a year  Admits that she is poorly compliant with medication  Admitted due to Worsening lower extremity wounds     S/p abdominal aortogram,  LLE SFA shockwave angioplasty/FATOU 10/31/22   S/p R femoral endarterectomy 11/7  Continue heparin drip  Continue atorvastatin 40 mg once daily, clopidogrel 45 mg once daily

## 2022-11-08 NOTE — ASSESSMENT & PLAN NOTE
Patient has uncontrolled diabetes, hypertension, hyperlipidemia, patient was lost to follow-up and has not seen endocrinologist for over a year  Admits that she is poorly compliant with medication  Admitted due to Worsening lower extremity wounds  · CTA abd/pelvis:   § 2   LEFT: Up to 50% stenosis of the proximal common iliac artery   The proximal and distal 3rd of the superficial femoral artery demonstrates 50-75% stenosis   Popliteal artery demonstrates greater than 75% stenosis in the P2 segment   Patent   three-vessel runoff extending into the left foot  § 3  RIGHT: Proximal common iliac artery stenosis of up to 50%   50% stenosis of the common femoral artery   Superficial femoral artery demonstrates up to 50% stenosis within the proximal several centimeters, 50-75% stenosis throughout the distal 3rd of   the vessel   Popliteal artery demonstrates scattered stenoses of 50-75%   Patent three-vessel runoff extending into the right foot    S/p abdominal aortogram,  LLE SFA shockwave angioplasty/FATOU 10/31/22   Post procedure  There was evidence of improvement in SHARIF in the left however on the right, SHARIF was noted to decrease to 0 51 (0 80)  CTA abd/pelvis w/ run off 11/1/22: Flow-limiting dissection flap at the prior arteriotomy site in the R CFA superimposed upon moderate to severe atherosclerotic disease  Focal severe atherosclerotic narrowing at the adductor canal with additional moderate atherosclerotic narrowing throughout the remainder of the R SFA    S/p R femoral endarterectomy 11/7  Will monitor post procedure in the ICU  Continue heparin drip  Continue atorvastatin 40 mg once daily, clopidogrel 45 mg once daily  Vascular is the primary   Likely transfer to the worse today

## 2022-11-08 NOTE — ASSESSMENT & PLAN NOTE
Lab Results   Component Value Date    WBC 16 57 (H) 11/08/2022    WBC 13 88 (H) 11/07/2022    WBC 8 74 11/07/2022     Echo on 11/07 shows 72% LVEF, no diastolic dysfunction  After the procedure patient was noted to have desaturated to 85% SpO2, and needed 5 L via face mask to saturate greater than 90%  Likely due to post procedure edema/sedation  Continue to monitor and wean off

## 2022-11-08 NOTE — CASE MANAGEMENT
Case Management Discharge Planning Note    Patient name Tani Tom  Location ICU 14/ICU 14 MRN [id number]  : 1960 Date 2022       Current Admission Date: 10/21/2022  Current Admission Diagnosis:Non healing left heel wound   Patient Active Problem List    Diagnosis Date Noted   • Acute on chronic anemia 10/30/2022   • Generalized edema due to fluid overload 10/27/2022   • PAD (peripheral artery disease) ([address] ) 10/25/2022   • Non healing left heel wound 10/22/2022   • Hypertensive urgency 10/22/2022   • Wound of lower extremity 10/21/2022   • Epigastric pain 2022   • Type 2 diabetes mellitus with hyperglycemia, with long-term current use of insulin ([address] ) 10/18/2021   • Hyperparathyroidism ([address] ) 10/18/2021   • 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 ([address] ) 2021   • 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   • Moderate protein-calorie malnutrition ([address] ) 2020   • Acute colitis 2020   • Arthritis 2019   • Depression, recurrent ([address] ) 2018   • Class 3 severe obesity due to excess calories with serious comorbidity and body mass index (BMI) of 40 0 to 44 9 in adult ([address] ) 2018   • Acute respiratory failure with hypoxia ([address] ) 2018   • Esophageal reflux 2018   • Uncontrolled type 2 diabetes with neuropathy 2018   • Diabetic peripheral neuropathy ([address] )    • Systolic murmur 10/76/1046   • Cerebral infarction ([address] ) 2015   • Anxiety 2015   • Vitamin D deficiency 2015   • Benign essential hypertension 2012   • Familial combined hyperlipidemia 2012      LOS (days): 17  Geometric Mean LOS (GMLOS) (days):   Days to GMLOS:     OBJECTIVE:  Risk of Unplanned Readmission Score: 25 58         Current admission status: Inpatient   Preferred Pharmacy:   [facility]/pharmacy #[id number]- [place] [address]  [facility] [address]  [address]  [place]  [facility] [address]  Phone: [phone] Fax: [phone]    Primary Care Provider: ALEX Morocho    Primary Insurance: BLUE CROSS  Secondary Insurance:     DISCHARGE DETAILS:    Discharge planning discussed with[de-identified] Patient  Freedom of Choice: Yes  Comments - Freedom of Choice: CM provided patient with STR choice list again as it has been nearly a week since it was given previously and patient had just undergone an additional surgery  Patient was feeling lethargic post-op, CM offered to come back tomorrow to discuss patient's choice  Patient in agreement with this plan  Other Referral/Resources/Interventions Provided:  Interventions: Short Term Rehab  Referral Comments: CM updated currentl STR referrals on patient's current status and updated the deadline in [place]           Treatment Team Recommendation: Short Term Rehab  Discharge Destination Plan[de-identified] Short Term Rehab

## 2022-11-08 NOTE — OCCUPATIONAL THERAPY NOTE
Occupational Therapy Progress Note(time=144-1450)     Patient Name: Sukhi Cardoza  MBMDO'J Date: 11/8/2022  Problem List  Principal Problem:    Non healing left heel wound  Active Problems:    Acute respiratory failure with hypoxia (HCC)    Depression, recurrent (HCC)    Benign essential hypertension    Type 2 diabetes mellitus with hyperglycemia, with long-term current use of insulin (HCC)    Wound of lower extremity    Hypertensive urgency    PAD (peripheral artery disease) (HCC)    Generalized edema due to fluid overload    Acute on chronic anemia        11/08/22 1450   Note Type   Note Type Treatment   Pain Assessment   Pain Assessment Tool 0-10   Pain Score 10 - Worst Possible Pain   Pain Location/Orientation Location: Generalized   Pain Rating: FLACC (Rest) - Face 0   Pain Rating: FLACC (Rest) - Legs 0   Pain Rating: FLACC (Rest) - Activity 0   Pain Rating: FLACC (Rest) - Cry 0   Pain Rating: FLACC (Rest) - Consolability 0   Score: FLACC (Rest) 0   Restrictions/Precautions   Weight Bearing Precautions Per Order Yes   RLE Weight Bearing Per Order WBAT   LLE Weight Bearing Per Order WBAT  (with heel offloading shoe)   Subjective   Subjective "I'm just too weak today "   Cognition   Overall Cognitive Status Impaired   Arousal/Participation Alert   Attention Attends with cues to redirect   Orientation Level Oriented to person;Oriented to place; Disoriented to time;Oriented to situation   Memory Decreased short term memory;Decreased recall of precautions   Following Commands Follows one step commands with increased time or repetition   Comments flat affect, limited eye contact,withdrawn; hx depression   Activity Tolerance   Activity Tolerance Patient limited by fatigue;Patient limited by pain   Medical Staff Made Aware nsg, P T , CM   Assessment   Assessment Pt seen for 10 min tx session with focus on cognition and education   Pt declining any OOB mobility 2* fatigue; pt s/p Right lower extremity angiogram -Right CFA balloon angioplasty with [id number]  [employer]  -Right CFA DCB with 6x40 Bard Lutonix -Right CFA atherectomy with Jetstream and distal embolization protection with 7mm Spider FX -Right SFA/Pop angioplasty with 4x40 Chololate balloon -Right SFA/Pop DCB with 5x150 Bard Lutonix -Right SFA stent with 6x80 [address]  [employer] x2 -Right PT/Pop angioplasty with [id number]  -Right distal pop angioplsty with 4x40 Bard Lutonix DCB (11/7)  Pt allowed WBAT L LE, R LE with heel unloading shoe  Therapist reviewed purpose and benefits of OT in the acute care setting  Reviewed pt's personal goals--i e return to prior level of function  Therapist reviewed d/c possibilities(i e inpt rehab) and pt agreeable to continue rehab  Pt agreeable for EOB/OOB mobility tomorrow  Slight cognitive deficits noted(i e orientation)  Therapist review need for OOB activity and increased level of independence  Will attempt to continue and extend goal date     Plan   Treatment Interventions Compensatory technique education;Equipment evaluation/education;Patient/family training;Cognitive reorientation   Goal Expiration Date 11/22/22   OT Treatment Day 4   OT Frequency 3-5x/wk   Recommendation   OT Discharge Recommendation Post acute rehabilitation services   AM-PAC Daily Activity Inpatient   Lower Body Dressing 2   Bathing 2   Toileting 2   Upper Body Dressing 2   Grooming 2   Eating 2   Daily Activity Raw Score 12   Daily Activity Standardized Score (Calc for Raw Score >=11) 30 6   AM-PAC Applied Cognition Inpatient   Following a Speech/Presentation 3   Understanding Ordinary Conversation 3   Taking Medications 3   Remembering Where Things Are Placed or Put Away 3   Remembering List of 4-5 Errands 3   Taking Care of Complicated Tasks 3   Applied Cognition Raw Score 18   Applied Cognition Standardized Score 38 07   Aurelio Daly

## 2022-11-08 NOTE — ASSESSMENT & PLAN NOTE
Lab Results   Component Value Date    HGBA1C 9 8 (H) 10/25/2022       Recent Labs     11/07/22 2031 11/08/22  0033 11/08/22  0537 11/08/22  0658   POCGLU 234* 235* 202* 209*       Blood Sugar Average: Last 72 hrs:  (P) 394 [id number]   Patient's home medication 60 units glargine at bedtime, Humalog 20 units t i d  Here patient was receiving 15 units glargine at night, sliding scale    Will increase glargine to 25 units  Currently still NPO per vascular

## 2022-11-08 NOTE — ASSESSMENT & PLAN NOTE
Lab Results   Component Value Date    CREATININE 1 20 11/08/2022    CREATININE 1 21 11/07/2022    CREATININE 1 40 (H) 11/07/2022       Lab Results   Component Value Date    EGFR 48 11/08/2022    EGFR 48 11/07/2022    EGFR 40 11/07/2022       • Patient admitted on 10/22 POA1 44 bl <1 1  Nephrology was following    Creatinine  Has trended down   • Prerenal secondary to sepsis secondary to UTI, use of Ace inhibitor  • Appears to be resolved and currently you around baseline levels    Plan:  • UA w/ microscopic, Urinary retention protocol, Bladder scan, Daily weights, I/O  • Continue sodium chloride infusion 75 mL/hour  • Monitor BMP daily and observe for downward trend of creatinine  • Avoid hypoperfusion of the kidneys, minimize nephrotoxins  • RAAS Blockers/Diuretics held:  Patient's home lisinopril is being held

## 2022-11-08 NOTE — ASSESSMENT & PLAN NOTE
Lab Results   Component Value Date    HGBA1C 9 8 (H) 10/25/2022     Recent Labs     11/08/22  0033 11/08/22  0537 11/08/22  0658 11/08/22  1231   POCGLU 235* 202* 209* 192*     · Continue sliding scale and basal bolus protocol  · Change to lantus 25 units bedtime, sliding scale  · Continue insulin sliding scale

## 2022-11-08 NOTE — ASSESSMENT & PLAN NOTE
Lab Results   Component Value Date    HGB 8 8 (L) 11/08/2022    HGB 8 7 (L) 11/07/2022    HGB 7 0 (L) 11/07/2022       Hemoglobin fluctuating with baseline around 10  S/p 1 unit transfused 11/3/2022, H/H improved and has remained stable ever since  FOBT neg, no current source of bleeding, groin without hematoma  Continue to monitor H/H

## 2022-11-08 NOTE — ASSESSMENT & PLAN NOTE
Lab Results   Component Value Date    CREATININE 1 20 11/08/2022    CREATININE 1 21 11/07/2022    CREATININE 1 40 (H) 11/07/2022      · Patient admitted on 10/22 POA1 44 bl <1 1  Nephrology was following  Creatinine  Has trended down   · Improved back to baseline   Now resolved     Plan:  · UA w/ microscopic, Urinary retention protocol, Bladder scan, Daily weights, I/O  · Discontinue fluids  · Monitor BMP daily and observe for downward trend of creatinine  · Avoid hypoperfusion of the kidneys, minimize nephrotoxins  · RAAS Blockers/Diuretics held:  Continue lisinopril

## 2022-11-08 NOTE — PHYSICAL THERAPY NOTE
PHYSICAL THERAPY NOTE          Patient Name: Venkatesh Arnold  [id number] Date: 11/8/2022 11/08/22 1451   PT Last Visit   PT Visit Date 11/08/22   Note Type   Note type Cancelled Session   Cancel Reasons Other   Additional Comments Just completed OT session  Per OT declining mobility and slightly lethargic  Will cx PT session and revisit on 11/9     Laura Herr PT

## 2022-11-08 NOTE — ASSESSMENT & PLAN NOTE
Vitals:    11/08/22 0532 11/08/22 0545 11/08/22 0600 11/08/22 0659   BP:       Pulse:  78 76    Resp:  19 19    Temp:    98 4 °F (36 9 °C)   TempSrc:    Axillary   SpO2:  97% 96%    Weight: 114 kg (250 lb 14 1 oz)      Height:       Patient's blood pressure has been running high in the 180s, 190s SBP  Patient's home dose of amlodipine 10 mg, lisinopril 40 mg, metoprolol succinate 25 mg daily  Lisinopril was held due to NYDIA  Plan:  Continue amlodipine 10 mg once daily, continue Coreg 25 mg b i d  Continue hydralazine 10 mg every 6 hours p r n   For SBP greater than 180  Patient also has Cardizem 5 mg/hr  to be titrated for SBP less than 160 per vascular

## 2022-11-08 NOTE — QUICK NOTE
Notified by RN that patient now without BLE signals  Patient seen at bedside in ICU  BL feet non-palp, BL pops with strong multiphasic signals  BL feet stably cool from prior, motor/sensory intact  L groin site CDI  Discussed with on-call vascular fellow, will continue to monitor neurovascular exam and ensure that patient retains motor/sensory function

## 2022-11-08 NOTE — PROGRESS NOTES
[facility] - Progress Note  Patient: Daiana Bliss 65 y o  female   MRN: [id number]  PCP: ALEX Ornelas  Unit/Bed#: ICU 14 Encounter: [id number]  Date Of Visit: 22    ASSESSMENT:    Daiana Bliss is a 65 y o  female with:    1  Diabetic ulcer left heel  2  Eschar / ischemic change right great toe and 5th toe  3  Type II diabetes with PAD              -s/p abdominal aortogram w/LLE intervention (DOS: 10/31/22)   -s/p RLE angiogram with intervention (DOS: 22)      PLAN:    · No immediate plan for podiatric surgical intervention  Dressings changed, no clinical sign of acute infection B/L   · F/u post-agram LEADs and final Vascular assessment  · Continue local wound care, betadine DSD  Appreciate nursing assistance with dressing changes  · Elevation on green foam wedges or pillows when non-ambulatory  · Rest of care per primary team     Weight bearing status: WBAT in heel offloading shoe left foot; weight bearing as tolerated right foot      SUBJECTIVE:     The patient was seen, evaluated, and assessed at bedside today  The patient was awake, alert, and in no acute distress  No acute events overnight  The patient reports no pain to B/L feet  Patient denies N/V/F/chills/SOB/CP  OBJECTIVE:     Vitals:   /62   Pulse 80   Temp 98 1 °F (36 7 °C) (Axillary)   Resp (!) 38   Ht 5' 3 75" (1 619 m)   Wt 114 kg (250 lb 14 1 oz)   SpO2 93%   BMI 43 40 kg/m²     Temp (24hrs), Av 9 °F (36 6 °C), Min:97 3 °F (36 3 °C), Max:98 4 °F (36 9 °C)      Physical Exam:     General:  Alert, cooperative, and in no distress  Lower extremity exam:  Cardiovascular status at baseline  Neurological status at baseline  Musculoskeletal status at baseline  No calf tenderness noted      Lower extremity wound(s) as noted below:  Wound #: 1  Location: left heel  Length 7cm: Width 5cm: Depth unknown cm:   Deepest Tissue Noted in Base: unknown  Probe to Bone: unknown  Peripheral Skin Description: Attached  Granulation: 0% Fibrotic Tissue: 0% Necrotic Tissue: 100%   Drainage Amount: none  Signs of Infection: No     Dry, stable eschars noted to left medial lower leg, left dorsal 5th toe, and right dorsal hallux  Dry gangrene noted to right 5th toe with ischemic changes extending proximally dorsally  There are mottled skin changes to the right foot  Clinical Images 11/08/22: Additional Data:     Labs:    Results from last 7 days   Lab Units 11/08/22  0532 11/07/22  1520 11/07/22  0437 11/06/22  0458   WBC Thousand/uL 16 57* 13 88*   < > 8 87   HEMOGLOBIN g/dL 8 8* 8 7*   < > 7 8*   HEMATOCRIT % 28 7* 27 2*   < > 25 7*   PLATELETS Thousands/uL 105* 157   < > 266   NEUTROS PCT %  --   --   --  69   LYMPHS PCT %  --   --   --  14   LYMPHO PCT %  --  5*  --   --    MONOS PCT %  --   --   --  8   MONO PCT %  --  3*  --   --    EOS PCT %  --  3  --  7*    < > = values in this interval not displayed  Results from last 7 days   Lab Units 11/08/22  0532 11/07/22  1520   POTASSIUM mmol/L 3 6 4 3   CHLORIDE mmol/L 104 104   CO2 mmol/L 24 24   BUN mg/dL 19 19   CREATININE mg/dL 1 20 1 21   CALCIUM mg/dL 7 4* 7 5*   ALK PHOS U/L  --  209*   ALT U/L  --  23   AST U/L  --  42     Results from last 7 days   Lab Units 11/01/22  2327   INR  1 14       * I Have Reviewed All Lab Data Listed Above  Recent Cultures (last 7 days):               Imaging: I have personally reviewed pertinent films in PACS  EKG, Pathology, and Other Studies: I have personally reviewed pertinent reports  ** Please Note: Portions of the record may have been created with voice recognition software  Occasional wrong word or "sound a like" substitutions may have occurred due to the inherent limitations of voice recognition software  Read the chart carefully and recognize, using context, where substitutions have occurred   **

## 2022-11-08 NOTE — ASSESSMENT & PLAN NOTE
· In setting of uncontrolled diabetes and known PAD  · MRI of the foot noted for "a large plantar lateral heel ulcer, without underlying soft tissue abscess  · Received Zosyn, no further abx per ID  · Continue aspirin, Plavix and statin  IR arteriogram completed on 10/31 left  lower leg arteriogram with proximal and distal SFA shockwave balloon lithotripsy and stent placement, as well as right common iliac artery stent placement  · Repeat imaging show increased velocity with flow limiting dissection of right lower limb  · Heparin gtt  · May need amputation    CTA performed today showed occlusion of previously placed stent,   "Suspect interval occlusion of recently placed proximal right common iliac artery stent"  ·

## 2022-11-08 NOTE — PLAN OF CARE
Problem: OCCUPATIONAL THERAPY ADULT  Goal: Performs self-care activities at highest level of function for planned discharge setting  See evaluation for individualized goals  Description: Treatment Interventions: ADL retraining, Functional transfer training, UE strengthening/ROM, Endurance training, Cognitive reorientation, Patient/family training, Equipment evaluation/education, Compensatory technique education, Energy conservation, Activityengagement          See flowsheet documentation for full assessment, interventions and recommendations  Note: Limitation: Decreased ADL status, Decreased UE strength, Decreased Safe judgement during ADL, Decreased endurance, Decreased high-level ADLs  Prognosis: Good  Assessment: Pt seen for 10 min tx session with focus on cognition and education  Pt declining any OOB mobility 2* fatigue; pt s/p Right lower extremity angiogram -Right CFA balloon angioplasty with [id number]  [employer]  -Right CFA DCB with 6x40 Bard Lutonix -Right CFA atherectomy with Jetstream and distal embolization protection with 7mm Spider FX -Right SFA/Pop angioplasty with 4x40 Chololate balloon -Right SFA/Pop DCB with 5x150 Bard Lutonix -Right SFA stent with 6x80 W W  [employer] x2 -Right PT/Pop angioplasty with [id number]  -Right distal pop angioplsty with 4x40 Bard Lutonix DCB (11/7)  Pt allowed WBAT L LE, R LE with heel unloading shoe  Therapist reviewed purpose and benefits of OT in the acute care setting  Reviewed pt's personal goals--i e return to prior level of function  Therapist reviewed d/c possibilities(i e inpt rehab) and pt agreeable to continue rehab  Pt agreeable for EOB/OOB mobility tomorrow  Slight cognitive deficits noted(i e orientation)  Therapist review need for OOB activity and increased level of independence  Will attempt to continue and extend goal date       OT Discharge Recommendation: Post acute rehabilitation services

## 2022-11-08 NOTE — PROGRESS NOTES
[facility]  Progress Note - [address] 1960, 65 y o  female MRN: [id number]  Unit/Bed#: ICU 14 Encounter: [id number]  Primary Care Provider: ALEX Redmond   Date and time admitted to hospital: 10/21/2022  7:58 PM    PAD (peripheral artery disease) [facility])  Assessment & Plan  Patient has uncontrolled diabetes, hypertension, hyperlipidemia, patient was lost to follow-up and has not seen endocrinologist for over a year  Admits that she is poorly compliant with medication  Admitted due to Worsening lower extremity wounds  · CTA abd/pelvis:   § 2   LEFT: Up to 50% stenosis of the proximal common iliac artery   The proximal and distal 3rd of the superficial femoral artery demonstrates 50-75% stenosis   Popliteal artery demonstrates greater than 75% stenosis in the P2 segment   Patent   three-vessel runoff extending into the left foot  § 3  RIGHT: Proximal common iliac artery stenosis of up to 50%   50% stenosis of the common femoral artery   Superficial femoral artery demonstrates up to 50% stenosis within the proximal several centimeters, 50-75% stenosis throughout the distal 3rd of   the vessel   Popliteal artery demonstrates scattered stenoses of 50-75%   Patent three-vessel runoff extending into the right foot    S/p abdominal aortogram,  LLE SFA shockwave angioplasty/FATOU 10/31/22   Post procedure  There was evidence of improvement in SHARIF in the left however on the right, SHARIF was noted to decrease to 0 51 (0 80)  CTA abd/pelvis w/ run off 11/1/22: Flow-limiting dissection flap at the prior arteriotomy site in the R CFA superimposed upon moderate to severe atherosclerotic disease  Focal severe atherosclerotic narrowing at the adductor canal with additional moderate atherosclerotic narrowing throughout the remainder of the R SFA    S/p R femoral endarterectomy 11/7  Will monitor post procedure in the ICU  Continue heparin drip  Continue atorvastatin 40 mg once daily, clopidogrel 45 mg once daily  Vascular is the primary   Likely transfer to the worse today      Acute respiratory failure with hypoxia [facility])  Assessment & Plan  Lab Results   Component Value Date    WBC 16 57 (H) 11/08/2022    WBC 13 88 (H) 11/07/2022    WBC 8 74 11/07/2022     Echo on 11/07 shows 71% LVEF, no diastolic dysfunction  After the procedure patient was noted to have desaturated to 85% SpO2, and needed 5 L via face mask to saturate greater than 90%  Likely due to post procedure edema/sedation  Continue to monitor and wean off    Acute on chronic anemia  Assessment & Plan  Lab Results   Component Value Date    HGB 8 8 (L) 11/08/2022    HGB 8 7 (L) 11/07/2022    HGB 7 0 (L) 11/07/2022       Hemoglobin fluctuating with baseline around 10  S/p 1 unit transfused 11/3/2022, H/H improved and has remained stable ever since  FOBT neg, no current source of bleeding, groin without hematoma  Continue to monitor H/H    NYDIA (acute kidney injury) [facility])  Assessment & Plan  Lab Results   Component Value Date    CREATININE 1 20 11/08/2022    CREATININE 1 21 11/07/2022    CREATININE 1 40 (H) 11/07/2022       Lab Results   Component Value Date    EGFR 48 11/08/2022    EGFR 48 11/07/2022    EGFR 40 11/07/2022       • Patient admitted on 10/22 POA1 44 bl <1 1  Nephrology was following    Creatinine  Has trended down   • Prerenal secondary to sepsis secondary to UTI, use of Ace inhibitor  • Appears to be resolved and currently you around baseline levels    Plan:  • UA w/ microscopic, Urinary retention protocol, Bladder scan, Daily weights, I/O  • Continue sodium chloride infusion 75 mL/hour  • Monitor BMP daily and observe for downward trend of creatinine  • Avoid hypoperfusion of the kidneys, minimize nephrotoxins  • RAAS Blockers/Diuretics held:  Patient's home lisinopril is being held      Type 2 diabetes mellitus with hyperglycemia, with long-term current use of insulin [facility])  Assessment & Plan  Lab Results   Component Value Date    HGBA1C 9 8 (H) 10/25/2022       Recent Labs     11/07/22 2031 11/08/22  0033 11/08/22  0537 11/08/22  0658   POCGLU 234* 235* 202* 209*       Blood Sugar Average: Last 72 hrs:  (P) 264 [id number]   Patient's home medication 60 units glargine at bedtime, Humalog 20 units t i d  Here patient was receiving 15 units glargine at night, sliding scale  Will increase glargine to 25 units  Currently still NPO per vascular    Benign essential hypertension  Assessment & Plan  Vitals:    11/08/22 0532 11/08/22 0545 11/08/22 0600 11/08/22 0659   BP:       Pulse:  78 76    Resp:  19 19    Temp:    98 4 °F (36 9 °C)   TempSrc:    Axillary   SpO2:  97% 96%    Weight: 114 kg (250 lb 14 1 oz)      Height:       Patient's blood pressure has been running high in the 180s, 190s SBP  Patient's home dose of amlodipine 10 mg, lisinopril 40 mg, metoprolol succinate 25 mg daily  Lisinopril was held due to NYDIA  Plan:  Continue amlodipine 10 mg once daily, continue Coreg 25 mg b i d  Continue hydralazine 10 mg every 6 hours p r n  For SBP greater than 180  Patient also has Cardizem 5 mg/hr  to be titrated for SBP less than 160 per vascular    Depression, recurrent (HCC)  Assessment & Plan  Continue sertraline 100 mg daily, continue alprazolam 0 5 mg every 8 hours p r n  For anxiety      ----------------------------------------------------------------------------------------  HPI/24hr events:  Patient had no palpable pulses, vascular was notified overnight  Bedside Doppler was done which showed minimal to no waveforms in the lower dorsal pedis pulses, strong waveforms were found in the popliteal area  Patient appropriate for transfer out of the ICU today?: Patient does not meet criteria for ICU Follow-up Clinic; referral has not been made     Disposition: Continue Critical Care   Code Status: Level 1 - Full Code  ---------------------------------------------------------------------------------------  SUBJECTIVE  Patient is doing well, patient wants to go home  Was able to talk to the patient, says that she is not compliant with her medications at home, and often misses doses of insulin and hypertensive medication  Patient notes some numbness on the left lower extremity   Patient does not report and headaches, shortness of breath, chest pain, abdominal pain, nausea vomiting, lower leg edema  Patient feels fatigued  No other subjective complaints noted        Review of Systems   Constitutional: Negative for chills and fever  HENT: Negative for ear pain and sore throat  Eyes: Negative for pain and visual disturbance  Respiratory: Negative for cough and shortness of breath  Cardiovascular: Negative for chest pain and palpitations  Gastrointestinal: Negative for abdominal pain and vomiting  Genitourinary: Negative for dysuria and hematuria  Musculoskeletal: Negative for arthralgias and back pain  Skin: Negative for color change and rash  Neurological: Negative for seizures and syncope  All other systems reviewed and are negative      Review of systems was reviewed and negative unless stated above in HPI/24-hour events   ---------------------------------------------------------------------------------------  OBJECTIVE    Vitals   Vitals:    22 0532 22 0545 22 0600 22 0659   BP:       Pulse:  78 76    Resp:  19 19    Temp:    98 4 °F (36 9 °C)   TempSrc:    Axillary   SpO2:  97% 96%    Weight: 114 kg (250 lb 14 1 oz)      Height:         Temp (24hrs), Av 9 °F (36 6 °C), Min:97 3 °F (36 3 °C), Max:98 4 °F (36 9 °C)  Current: Temperature: 98 4 °F (36 9 °C)  Arterial Line BP: 142/50  Arterial Line MAP (mmHg): 80 mmHg    Respiratory:  SpO2: SpO2: 96 %  Nasal Cannula O2 Flow Rate (L/min): 8 L/min    Invasive/non-invasive ventilation settings   Respiratory  Report   Lab Data (Last 4 hours)    None         O2/Vent Data (Last 4 hours)    None                Physical Exam  Vitals and nursing note reviewed  Constitutional:       General: She is not in acute distress  Appearance: She is well-developed  She is obese  She is ill-appearing  Comments: Patient appears fatigued, tired  Opens eyes with name calling and is verbally responsive  Currently on 5 L via nasal cannula saturating above 90%   HENT:      Head: Normocephalic and atraumatic  Nose: Nose normal       Mouth/Throat:      Mouth: Mucous membranes are moist    Eyes:      Conjunctiva/sclera: Conjunctivae normal    Cardiovascular:      Rate and Rhythm: Normal rate and regular rhythm  Heart sounds: No murmur heard  Pulmonary:      Effort: Pulmonary effort is normal  No respiratory distress  Breath sounds: Normal breath sounds  Abdominal:      Palpations: Abdomen is soft  Tenderness: There is no abdominal tenderness  Musculoskeletal:      Cervical back: Neck supple  Right lower leg: Edema present  Left lower leg: Edema present  Comments: Patient has not palpable dorsal pedal pulses, palpable popliteal pulses bilaterally  Lower extremity looks pale and cold to touch, stable from 1 day prior   Skin:     General: Skin is warm and dry  Capillary Refill: Capillary refill takes less than 2 seconds  Neurological:      General: No focal deficit present  Mental Status: She is alert and oriented to person, place, and time     Psychiatric:         Mood and Affect: Mood normal              Laboratory and Diagnostics:  Results from last 7 days   Lab Units 11/08/22  0532 11/07/22  1520 11/07/22  0437 11/06/22  0458 11/05/22  0541 11/04/22  0503 11/03/22  0445 11/02/22  0607   WBC Thousand/uL 16 57* 13 88* 8 74 8 87 9 62 11 80* 10 85* 13 66*   HEMOGLOBIN g/dL 8 8* 8 7* 7 0* 7 8* 8 0* 8 4* 6 8* 7 6*   HEMATOCRIT % 28 7* 27 2* 22 8* 25 7* 25 8* 27 1* 23 1* 25 4*   PLATELETS Thousands/uL 105* 157 266 266 277 284 296 316   NEUTROS PCT %  --   --   --  69 72 73 69 73   BANDS PCT %  --  2  --   --   --   --   -- --    MONOS PCT %  --   --   --  8 7 8 8 8   MONO PCT %  --  3*  --   --   --   --   --   --      Results from last 7 days   Lab Units 11/08/22  0532 11/07/22  1520 11/07/22  0437 11/06/22  0458 11/05/22  0541 11/04/22  0503 11/03/22  0445   SODIUM mmol/L 139 140 137 138 138 138 142   POTASSIUM mmol/L 3 6 4 3 3 8 3 8 5 0 3 8 3 6   CHLORIDE mmol/L 104 104 100 99 100 100 104   CO2 mmol/L 24 24 28 30 31 28 31   ANION GAP mmol/L 11 12 9 9 7 10 7   BUN mg/dL 19 19 19 19 18 17 15   CREATININE mg/dL 1 20 1 21 1 40* 1 37* 1 37* 1 38* 1 30   CALCIUM mg/dL 7 4* 7 5* 8 2* 8 6 8 5 8 8 8 4   GLUCOSE RANDOM mg/dL 216* 207* 135 167* 139 117 74   ALT U/L  --  23  --   --   --   --   --    AST U/L  --  42  --   --   --   --   --    ALK PHOS U/L  --  209*  --   --   --   --   --    ALBUMIN g/dL  --  1 8*  --   --   --   --   --    TOTAL BILIRUBIN mg/dL  --  1 53*  --   --   --   --   --      Results from last 7 days   Lab Units 11/07/22  1520   MAGNESIUM mg/dL 1 7   PHOSPHORUS mg/dL 5 6*      Results from last 7 days   Lab Units 11/08/22  0532 11/07/22  1938 11/07/22  1520 11/07/22  0437 11/06/22  0458 11/05/22  2000 11/05/22  1350 11/02/22  0607 11/01/22  2327   INR   --   --   --   --   --   --   --   --  1 14   PTT seconds 110* 40* >210* 52* 79* 72* 62*   < > 36    < > = values in this interval not displayed  ABG:  Results from last 7 days   Lab Units 11/07/22  1938   PH ART  7 335*   PCO2 ART mm Hg 43 2   PO2 ART mm Hg 101 1   HCO3 ART mmol/L 22 5   BASE EXC ART mmol/L -3 2   ABG SOURCE  Line, Arterial     VBG:  Results from last 7 days   Lab Units 11/07/22  1938   ABG SOURCE  Line, Arterial           Micro        EKG:  None  Imaging: I have personally reviewed pertinent reports  Intake and Output  I/O       11/06 0701 11/07 0700 11/07 0701 11/08 0700 11/08 0701 11/09 0700    P  O  720      I V  (mL/kg)  5798 7 (50 9)     Blood  350     Total Intake(mL/kg) 720 (6 4) 6148 7 (53 9)     Urine (mL/kg/hr) 1800 (0 7) 3215 (1 2)     Stool  0     Total Output 1800 3215     Net -1080 +2933 7            Unmeasured Stool Occurrence  2 x           Height and Weights   Height: 5' 3 75" (161 9 cm)     Body mass index is 43 4 kg/m²  Weight (last 2 days)     Date/Time Weight    11/08/22 0532 114 (250 88)    11/07/22 1830 113 (249 56)    11/07/22 1707 113 (249 56)    11/07/22 0600 113 (250)    11/06/22 0554 112 (247 14)            Nutrition       Diet Orders   (From admission, onward)             Start     Ordered    11/07/22 1707  Diet NPO  Diet effective now        References:    Nutrtion Support Algorithm Enteral vs  Parenteral   Question Answer Comment   Diet Type NPO    RD to adjust diet per protocol?  Yes        11/07/22 1706                  Active Medications  Scheduled Meds:  Current Facility-Administered Medications   Medication Dose Route Frequency Provider Last Rate   • acetaminophen  650 mg Oral Q6H PRN Jose Gaspar MD     • albuterol  2 puff Inhalation Q4H PRN Jose Gaspar MD     • amLODIPine  10 mg Oral Daily Jose Gaspar MD     • atorvastatin  40 mg Oral Daily With Silvia Samuels MD     • carvedilol  25 mg Oral BID With Meals Jose Gaspar MD     • clopidogrel  75 mg Oral Daily Jose Gaspar MD     • fentaNYL  25 mcg Intravenous Q5 Min PRN Rehana Lewis DO     • gabapentin  200 mg Oral BID Jose Gaspar MD     • heparin (porcine)  3-30 Units/kg/hr (Order-Specific) Intravenous Titrated Jose Gaspar MD 16 Units/kg/hr (11/08/22 0023)   • heparin (porcine)  4,400 Units Intravenous Q1H PRN Jose Gaspar MD     • heparin (porcine)  8,800 Units Intravenous Q1H PRN Jose Gaspar MD     • hydrALAZINE  10 mg Intravenous Q6H PRN Jose Gaspar MD     • HYDROmorphone  0 5 mg Intravenous Q10 Min PRN Rehana Lewis DO     • insulin glargine  25 Units Subcutaneous HS Leatha Lockhart MD     • insulin lispro  2-12 Units Subcutaneous Q6H [facility] Faheem Cannon Sudhakar Floyd MD     • LORazepam  0 5 mg Oral Q8H PRN Richard Oswald MD     • metoclopramide  5 mg Intravenous Q6H PRN Richard Oswald MD     • morphine injection  4 mg Intravenous Q4H PRN Richard Oswald MD     • niCARdipine  1-15 mg/hr Intravenous Titrated Maximemaurice Esteban DO 5 mg/hr (11/08/22 0735)   • nystatin   Topical BID Richard Oswald MD     • ondansetron  4 mg Intravenous Q6H PRN Richard Oswald MD     • ondansetron  4 mg Intravenous Once PRN Eduard Ulloa DO     • oxyCODONE  5 mg Oral Q4H PRN Richard Oswald MD      Or   • oxyCODONE  10 mg Oral Q4H PRN Richard Oswald MD     • pantoprazole  40 mg Oral Early Morning Richard Oswald MD     • sertraline  100 mg Oral Daily Richard Oswald MD     • sodium chloride  75 mL/hr Intravenous Continuous Richard Oswald MD 75 mL/hr (11/08/22 0231)     Continuous Infusions:  heparin (porcine), 3-30 Units/kg/hr (Order-Specific), Last Rate: 16 Units/kg/hr (11/08/22 0657)  niCARdipine, 1-15 mg/hr, Last Rate: 5 mg/hr (11/08/22 0735)  sodium chloride, 75 mL/hr, Last Rate: 75 mL/hr (11/08/22 0231)      PRN Meds:   acetaminophen, 650 mg, Q6H PRN  albuterol, 2 puff, Q4H PRN  fentaNYL, 25 mcg, Q5 Min PRN  heparin (porcine), 4,400 Units, Q1H PRN  heparin (porcine), 8,800 Units, Q1H PRN  hydrALAZINE, 10 mg, Q6H PRN  HYDROmorphone, 0 5 mg, Q10 Min PRN  LORazepam, 0 5 mg, Q8H PRN  metoclopramide, 5 mg, Q6H PRN  morphine injection, 4 mg, Q4H PRN  ondansetron, 4 mg, Q6H PRN  ondansetron, 4 mg, Once PRN  oxyCODONE, 5 mg, Q4H PRN   Or  oxyCODONE, 10 mg, Q4H PRN        Invasive Devices Review  Invasive Devices  Report    Peripheral Intravenous Line  Duration           Peripheral IV 11/05/22 Left;Proximal;Ventral (anterior) Forearm 3 days    Peripheral IV 11/07/22 Left Hand 1 day    Peripheral IV 11/07/22 Left Wrist 1 day          Arterial Line  Duration           Arterial Line 11/07/22 Right Radial <1 day          Drain  Duration           Urethral Catheter Latex 16 Fr  1 day                Rationale for remaining devices:  None  ---------------------------------------------------------------------------------------  Advance Directive and Living Will:      Power of :    POLST:    ---------------------------------------------------------------------------------------  Care Time Delivered:   No Critical Care time spent       Franklyn Lao MD      Portions of the record may have been created with voice recognition software  Occasional wrong word or "sound a like" substitutions may have occurred due to the inherent limitations of voice recognition software    Read the chart carefully and recognize, using context, where substitutions have occurred

## 2022-11-08 NOTE — ASSESSMENT & PLAN NOTE
Continue Coreg, blood pressure well controlled  · Continue lisinopril 40 mg and amlodipine 10 mg daily  · Hydralazine prn for sbp greater than 180

## 2022-11-08 NOTE — ASSESSMENT & PLAN NOTE
· Continue Coreg, blood pressure well controlled  · Continue lisinopril 40 mg and amlodipine 10 mg daily  · Hydralazine prn for sbp greater than 180

## 2022-11-08 NOTE — ASSESSMENT & PLAN NOTE
Echo on 11/07 shows 87% LVEF, no diastolic dysfunction  After the procedure patient was noted to have desaturated to 85% SpO2, and needed 5 L via face mask to saturate greater than 90%  Oxygenation improved, clinically improved  Continue to wean

## 2022-11-08 NOTE — ASSESSMENT & PLAN NOTE
Hemoglobin fluctuating with baseline around 10  S/p 1 unit transfused 11/3/2022, H/H improved and has remained stable ever since  Recent Labs     11/08/22  0532 11/09/22  0439   HGB 8 8* 8 6*

## 2022-11-08 NOTE — PROGRESS NOTES
NEPHROLOGY PROGRESS NOTE   Victoria Monzon 65 y o  female MRN: [id number]  Unit/Bed#: ICU 14 Encounter: [id number]  Reason for Consult: NYDIA    ASSESSMENT AND PLAN:  Mild NYDIA, baseline creatinine 1 0 in early 2022, prior 0 7 to 0 9  -creatinine 1 1 on admission had increased up to 1 4 during hospitalization, now seems to have overall plateaued 1 1 to 1 3 range   -may suggest likely new baseline   -status post multiple contrast exposure on 10/31, 11/1, and most recent on 11/7   -closely monitor for SANTOS  -has been on IV fluid, okay to discontinue fluid  -currently has Jacinto catheter, monitor urine output  -recent UA showed 3+ proteinuria, innumerable RBCs, WBCs, bacteria, very concentrated sample   -would like repeat UA with microscopy once Jacinto catheter is removed  Proteinuria, no recent UPC ratio available, prior urine microalbumin/creatinine ratio 2 7 g in 2017  -suspect in the setting of uncontrolled diabetes, last hemoglobin A1c 9 8 in October 2022  This ranges from 9 to 13 going back to 2019  -on lisinopril 40 mg daily as outpatient, currently remains on hold  Hypertension  -outpatient regimen includes lisinopril 40 mg daily, Toprol-XL 25 mg daily, amlodipine 10 mg daily  -patient did not receive p o  Antihypertensive regimen yesterday given patient was in OR  Currently remains on Cardene drip   -continue to avoid lisinopril  -being restarted amlodipine 10 mg daily, Coreg 25 mg b i d  Significant PVD, status post lower extremity angiogram, right CFA balloon angioplasty, stent placement, atherectomy  -vascular surgery follow-up    Clinically seems mildly volume overloaded, currently remains on 6 L O2 via nasal cannula although O2 requirement seems to be significantly improving overnight   -most recent echo shows EF 56%, normal diastolic function  -recommend to discontinue IV fluid  -may give p r n   Lasix, reassess tomorrow for need for further Lasix    Discussed above plan in detail with ICU team     SUBJECTIVE:  Patient seen and examined at bedside  No chest pain, shortness of breath, nausea, vomiting, abdominal pain       OBJECTIVE:  Current Weight: Weight - Scale: 114 kg (250 lb 14 1 oz)  Vitals:    11/08/22 0659   BP:    Pulse:    Resp:    Temp: 98 4 °F (36 9 °C)   SpO2:        Intake/Output Summary (Last 24 hours) at 11/8/2022 0909  Last data filed at 11/8/2022 0600  Gross per 24 hour   Intake 5798 7 ml   Output 2815 ml   Net 2983 7 ml     Wt Readings from Last 3 Encounters:   11/08/22 114 kg (250 lb 14 1 oz)   10/26/22 98 4 kg (216 lb 14 9 oz)   10/21/22 98 4 kg (217 lb)     Temp Readings from Last 3 Encounters:   11/08/22 98 4 °F (36 9 °C) (Axillary)   10/21/22 97 7 °F (36 5 °C) (Temporal)   03/22/22 98 6 °F (37 °C)     BP Readings from Last 3 Encounters:   11/07/22 (!) 174/73   10/21/22 160/90   04/27/22 138/70     Pulse Readings from Last 3 Encounters:   11/08/22 76   10/21/22 99   03/22/22 92        Physical Examination:  General:  Lying in bed, no acute distress   Eyes:  Mild conjunctival pallor present  ENT:  External examination of ears and nose unremarkable  Neck:  No obvious lymphadenopathy appreciated  Respiratory:  Decreased breath sound at bases  CVS:  S1, S2 present  GI:  Soft, nondistended  CNS:  Active alert oriented x3  Skin:  No new rash  Musculoskeletal:  No obvious new gross deformity noted    Medications:    Current Facility-Administered Medications:   •  acetaminophen (TYLENOL) tablet 650 mg, 650 mg, Oral, Q6H PRN, Erin Joseph MD, 650 mg at 10/23/22 1751  •  albuterol (PROVENTIL HFA,VENTOLIN HFA) inhaler 2 puff, 2 puff, Inhalation, Q4H PRN, Erin Joseph MD, 2 puff at 10/30/22 0611  •  amLODIPine (NORVASC) tablet 10 mg, 10 mg, Oral, Daily, Erin Joseph MD, 10 mg at 11/06/22 7396  •  atorvastatin (LIPITOR) tablet 40 mg, 40 mg, Oral, Daily With Dinner, Erin Joseph MD, 40 mg at 11/06/22 1703  •  carvedilol (COREG) tablet 25 mg, 25 mg, Oral, BID With Meals, Nicole Rashid MD, 25 mg at 11/06/22 1705  •  clopidogrel (PLAVIX) tablet 75 mg, 75 mg, Oral, Daily, Nicole Rashid MD, 75 mg at 11/06/22 0858  •  fentaNYL (SUBLIMAZE) injection 25 mcg, 25 mcg, Intravenous, Q5 Min PRN, Jacinda Lewis, DO  •  gabapentin (NEURONTIN) capsule 200 mg, 200 mg, Oral, BID, Nicole Rashid MD, 200 mg at 11/06/22 1705  •  heparin (porcine) 25,000 units in 0 45% NaCl 250 mL infusion (premix), 3-30 Units/kg/hr (Order-Specific), Intravenous, Titrated, Nicole Rashid MD, Last Rate: 17 6 mL/hr at 11/08/22 0657, 16 Units/kg/hr at 11/08/22 0657  •  heparin (porcine) injection 4,400 Units, 4,400 Units, Intravenous, Q1H PRN, Nicole Rashid MD, 4,400 Units at 11/07/22 0710  •  heparin (porcine) injection 8,800 Units, 8,800 Units, Intravenous, Q1H PRN, Nicole Rashid MD, 1,629 Units at 11/07/22 2152  •  hydrALAZINE (APRESOLINE) injection 10 mg, 10 mg, Intravenous, Q6H PRN, Nicole Rashid MD, 10 mg at 10/27/22 1857  •  HYDROmorphone (DILAUDID) injection 0 5 mg, 0 5 mg, Intravenous, Q10 Min PRN, Jacinda Lewis, DO  •  insulin glargine (LANTUS) subcutaneous injection 25 Units 0 25 mL, 25 Units, Subcutaneous, HS, Cleophus Baumgarten, MD  •  insulin lispro (HumaLOG) 100 units/mL subcutaneous injection 2-12 Units, 2-12 Units, Subcutaneous, Q6H [facility] **AND** Fingerstick Glucose (POCT), , , Q6H, Cleophus Baumgarten, MD  •  LORazepam (ATIVAN) tablet 0 5 mg, 0 5 mg, Oral, Q8H PRN, Nicole Rashid MD, 0 5 mg at 11/08/22 0530  •  metoclopramide (REGLAN) injection 5 mg, 5 mg, Intravenous, Q6H PRN, Nicole Rashid MD, 5 mg at 10/23/22 3573  •  morphine injection 4 mg, 4 mg, Intravenous, Q4H PRN, Nicole Rashid MD, 4 mg at 11/07/22 0601  •  niCARdipine (CARDENE) 25 mg (STANDARD CONCENTRATION) in sodium chloride 0 9% 250 mL, 1-15 mg/hr, Intravenous, Titrated, Germaine Ulloa DO, Last Rate: 50 mL/hr at 11/08/22 0735, 5 mg/hr at 11/08/22 0735  •  nystatin (MYCOSTATIN) powder, , Topical, BID, Claire Doyle MD, Given at 11/07/22 1912  •  ondansetron [facility]) injection 4 mg, 4 mg, Intravenous, Q6H PRN, Claire Doyle MD, 4 mg at 11/07/22 2352  •  ondansetron (ZOFRAN) injection 4 mg, 4 mg, Intravenous, Once PRN, Giuliano Lewis DO  •  oxyCODONE (ROXICODONE) IR tablet 5 mg, 5 mg, Oral, Q4H PRN, 5 mg at 11/08/22 0042 **OR** oxyCODONE (ROXICODONE) immediate release tablet 10 mg, 10 mg, Oral, Q4H PRN, Claire Doyle MD, 10 mg at 11/07/22 0445  •  pantoprazole (PROTONIX) EC tablet 40 mg, 40 mg, Oral, Early Morning, Claire Doyle MD, 40 mg at 11/08/22 0531  •  sertraline (ZOLOFT) tablet 100 mg, 100 mg, Oral, Daily, Claire Doyle MD, 100 mg at 11/06/22 0858  •  sodium chloride 0 9 % infusion, 75 mL/hr, Intravenous, Continuous, Claire Doyle MD, Last Rate: 75 mL/hr at 11/08/22 0231, 75 mL/hr at 11/08/22 0231    Laboratory Results:  Results from last 7 days   Lab Units 11/08/22  0532 11/07/22  1520 11/07/22  0437 11/06/22  0458 11/05/22  0541 11/04/22  0503 11/03/22  0445   WBC Thousand/uL 16 57* 13 88* 8 74 8 87 9 62 11 80* 10 85*   HEMOGLOBIN g/dL 8 8* 8 7* 7 0* 7 8* 8 0* 8 4* 6 8*   HEMATOCRIT % 28 7* 27 2* 22 8* 25 7* 25 8* 27 1* 23 1*   PLATELETS Thousands/uL 105* 157 266 266 277 284 296   SODIUM mmol/L 139 140 137 138 138 138 142   POTASSIUM mmol/L 3 6 4 3 3 8 3 8 5 0 3 8 3 6   CHLORIDE mmol/L 104 104 100 99 100 100 104   CO2 mmol/L 24 24 28 30 31 28 31   BUN mg/dL 19 19 19 19 18 17 15   CREATININE mg/dL 1 20 1 21 1 40* 1 37* 1 37* 1 38* 1 30   CALCIUM mg/dL 7 4* 7 5* 8 2* 8 6 8 5 8 8 8 4   MAGNESIUM mg/dL  --  1 7  --   --   --   --   --    PHOSPHORUS mg/dL  --  5 6*  --   --   --   --   --        VAS lower limb vein mapping bypass graft   Final Result by Chris Hollis MD (11/03 0945)      CTA ABDOMEN W RUN OFF W WO CONTRAST   Final Result by Liberty Anderson MD (11/02 8999)   Vascular:   Left lower extremity:   Interval stenting and lithotripsy of the left superficial femoral artery  Right lower extremity:   1  Interval development of a probable flow-limiting dissection flap at the prior arteriotomy site in the right common femoral artery superimposed upon moderate to severe atherosclerotic disease  2   Focal severe atherosclerotic narrowing at the adductor canal with additional moderate atherosclerotic narrowing throughout the remainder of the right superficial femoral artery  The study was marked in EPIC for significant notification  Workstation performed: [id number]         VAS lower limb arterial duplex, limited, unilateral   Final Result by Tanya Lennox, MD (11/02 1658)      VAS SHARIF & waveform analysis, multiple levels   Final Result by Tanya Lennox, MD (11/01 1541)      XR chest portable   Final Result by Vanessa Devi MD (10/31 4178)      Pulmonary edema has increased since October 26, 2022  Workstation performed: [id number]         IR aortagram with run-off   Final Result by Radu Jones MD (11/01 9234)      Left lower leg arteriogram with proximal and distal SFA shockwave balloon lithotripsy and stent placement, as well as right common iliac artery stent placement  PLAN:      Follow-up arterial duplex  Patient became short of breath after the procedure, with increased O2 requirement  Etiology most likely volume overload secondary to renal prep as well as contrast and fluid was given during the procedure where she was lying flat  Dr Sydnee Montelongo from the    primary team made aware  Chest x-ray to be obtained upon arrival to the floor  Workstation performed: [id number]         MRI follow up [facility]   Final Result by Floridalma Diggs MD (10/28 1188)      Nondiagnostic study  Workstation performed: [id number]         CTA ABDOMEN W RUN OFF W WO CONTRAST   Final Result by Radu Jones MD (10/28 0883)      1    Moderate stenosis of the mid infrarenal abdominal aorta secondary to calcific atherosclerotic disease  2  LEFT: Up to 50% stenosis of the proximal common iliac artery  The proximal and distal 3rd of the superficial femoral artery demonstrates 50-75% stenosis  Popliteal artery demonstrates greater than 75% stenosis in the P2 segment  Patent    three-vessel runoff extending into the left foot  3  RIGHT: Proximal common iliac artery stenosis of up to 50%  50% stenosis of the common femoral artery  Superficial femoral artery demonstrates up to 50% stenosis within the proximal several centimeters, 50-75% stenosis throughout the distal 3rd of    the vessel  Popliteal artery demonstrates scattered stenoses of 50-75%  Patent three-vessel runoff extending into the right foot  Workstation performed: [id number]         MRI ankle/heel left  wo contrast   Final Result by Moses Redd MD (10/27 1949)      Exam moderately limited due to patient motion  There is a large plantar lateral heel ulcer, without underlying soft tissue abscess (series 5 image 5-15 )      Associated with this ulcer, there is only very minimal reactive marrow edema in the calcaneal tubercle evident on T2-weighted sequences (series 3 image 2, series 7 image 8 ) No confluent T1-weighted marrow abnormality or cortical destruction to suggest    osteomyelitis  Workstation performed: [id number]         XR chest portable   Final Result by Radha Majano MD (10/27 8392)      Mild pulmonary vascular congestion  Workstation performed: [id number]         VAS lower limb arterial duplex, complete bilateral   Final Result by Jonathan Todd MD (10/24 1400)      CT lower extremity w contrast left   Final Result by Talib Bermudez DO (10/22 0102)      No acute bony process is seen  Moderate superficial soft tissue edema in the foot, ankle, and leg, superimposed cellulitis considered in the appropriate clinical setting    Correlation with patient's symptoms and laboratory values recommended  No discrete drainable collection    identified  Other findings as above  Workstation performed: [id number]         XR chest 1 view portable   ED Interpretation by Manuelito Shipman PA-C (10/21 2203)   ED wet read:  Possible mild vascular congestion  Final Result by Fantasma Keller MD (54/90 2429)      No acute cardiopulmonary disease  Workstation performed: [id number]         XR foot 3+ views RIGHT   Final Result by Fantasma Keller MD (15/78 6254)      No acute osseous abnormality  Workstation performed: [id number]         IR other    (Results Pending)   IR lower extremity angiogram    (Results Pending)   IR lower extremity angiogram    (Results Pending)   XR chest portable ICU    (Results Pending)       Portions of the record may have been created with voice recognition software  Occasional wrong word or "sound a like" substitutions may have occurred due to the inherent limitations of voice recognition software  Read the chart carefully and recognize, using context, where substitutions have occurred

## 2022-11-09 ENCOUNTER — APPOINTMENT (INPATIENT)
Dept: CT IMAGING | Facility: HOSPITAL | Age: 62
DRG: 853 | End: 2022-11-09
Payer: COMMERCIAL

## 2022-11-09 ENCOUNTER — APPOINTMENT (INPATIENT)
Dept: NON INVASIVE DIAGNOSTICS | Facility: HOSPITAL | Age: 62
DRG: 853 | End: 2022-11-09
Payer: COMMERCIAL

## 2022-11-09 LAB
ANION GAP SERPL CALCULATED.3IONS-SCNC: 10 MMOL/L (ref 4–13)
APTT PPP: 80 SECONDS (ref 23–37)
APTT PPP: 84 SECONDS (ref 23–37)
BUN SERPL-MCNC: 17 MG/DL (ref 5–25)
CALCIUM SERPL-MCNC: 7.8 MG/DL (ref 8.3–10.1)
CHLORIDE SERPL-SCNC: 105 MMOL/L (ref 96–108)
CK MB SERPL-MCNC: 2.1 % (ref 0–2.5)
CK MB SERPL-MCNC: 39.3 NG/ML (ref 0–5)
CK SERPL-CCNC: 1911 U/L (ref 26–192)
CO2 SERPL-SCNC: 26 MMOL/L (ref 21–32)
CREAT SERPL-MCNC: 1.02 MG/DL (ref 0.6–1.3)
ERYTHROCYTE [DISTWIDTH] IN BLOOD BY AUTOMATED COUNT: 16.1 % (ref 11.6–15.1)
GFR SERPL CREATININE-BSD FRML MDRD: 59 ML/MIN/1.73SQ M
GLUCOSE SERPL-MCNC: 122 MG/DL (ref 65–140)
GLUCOSE SERPL-MCNC: 142 MG/DL (ref 65–140)
GLUCOSE SERPL-MCNC: 152 MG/DL (ref 65–140)
GLUCOSE SERPL-MCNC: 153 MG/DL (ref 65–140)
GLUCOSE SERPL-MCNC: 158 MG/DL (ref 65–140)
HCT VFR BLD AUTO: 28.2 % (ref 34.8–46.1)
HGB BLD-MCNC: 8.6 G/DL (ref 11.5–15.4)
MCH RBC QN AUTO: 28 PG (ref 26.8–34.3)
MCHC RBC AUTO-ENTMCNC: 30.5 G/DL (ref 31.4–37.4)
MCV RBC AUTO: 92 FL (ref 82–98)
PLATELET # BLD AUTO: 83 THOUSANDS/UL (ref 149–390)
PMV BLD AUTO: 9.6 FL (ref 8.9–12.7)
POTASSIUM SERPL-SCNC: 3.5 MMOL/L (ref 3.5–5.3)
RBC # BLD AUTO: 3.07 MILLION/UL (ref 3.81–5.12)
SODIUM SERPL-SCNC: 141 MMOL/L (ref 135–147)
WBC # BLD AUTO: 14.83 THOUSAND/UL (ref 4.31–10.16)

## 2022-11-09 PROCEDURE — 99232 SBSQ HOSP IP/OBS MODERATE 35: CPT | Performed by: SURGERY

## 2022-11-09 PROCEDURE — 93925 LOWER EXTREMITY STUDY: CPT | Performed by: SURGERY

## 2022-11-09 PROCEDURE — 82550 ASSAY OF CK (CPK): CPT | Performed by: STUDENT IN AN ORGANIZED HEALTH CARE EDUCATION/TRAINING PROGRAM

## 2022-11-09 PROCEDURE — 82553 CREATINE MB FRACTION: CPT | Performed by: STUDENT IN AN ORGANIZED HEALTH CARE EDUCATION/TRAINING PROGRAM

## 2022-11-09 PROCEDURE — 93923 UPR/LXTR ART STDY 3+ LVLS: CPT

## 2022-11-09 PROCEDURE — 99232 SBSQ HOSP IP/OBS MODERATE 35: CPT | Performed by: INTERNAL MEDICINE

## 2022-11-09 PROCEDURE — 80048 BASIC METABOLIC PNL TOTAL CA: CPT

## 2022-11-09 PROCEDURE — 82948 REAGENT STRIP/BLOOD GLUCOSE: CPT

## 2022-11-09 PROCEDURE — 97112 NEUROMUSCULAR REEDUCATION: CPT

## 2022-11-09 PROCEDURE — 75635 CT ANGIO ABDOMINAL ARTERIES: CPT

## 2022-11-09 PROCEDURE — 97535 SELF CARE MNGMENT TRAINING: CPT

## 2022-11-09 PROCEDURE — 85730 THROMBOPLASTIN TIME PARTIAL: CPT | Performed by: INTERNAL MEDICINE

## 2022-11-09 PROCEDURE — 99233 SBSQ HOSP IP/OBS HIGH 50: CPT | Performed by: INTERNAL MEDICINE

## 2022-11-09 PROCEDURE — 97530 THERAPEUTIC ACTIVITIES: CPT

## 2022-11-09 PROCEDURE — 93925 LOWER EXTREMITY STUDY: CPT

## 2022-11-09 PROCEDURE — 93922 UPR/L XTREMITY ART 2 LEVELS: CPT | Performed by: SURGERY

## 2022-11-09 PROCEDURE — 85027 COMPLETE CBC AUTOMATED: CPT

## 2022-11-09 PROCEDURE — 97164 PT RE-EVAL EST PLAN CARE: CPT

## 2022-11-09 RX ORDER — FUROSEMIDE 40 MG/1
40 TABLET ORAL ONCE
Status: COMPLETED | OUTPATIENT
Start: 2022-11-09 | End: 2022-11-09

## 2022-11-09 RX ORDER — SODIUM CHLORIDE 9 MG/ML
75 INJECTION, SOLUTION INTRAVENOUS CONTINUOUS
Status: DISPENSED | OUTPATIENT
Start: 2022-11-09 | End: 2022-11-09

## 2022-11-09 RX ORDER — POTASSIUM CHLORIDE 20 MEQ/1
20 TABLET, EXTENDED RELEASE ORAL ONCE
Status: COMPLETED | OUTPATIENT
Start: 2022-11-09 | End: 2022-11-09

## 2022-11-09 RX ORDER — DOXAZOSIN MESYLATE 1 MG/1
1 TABLET ORAL DAILY
Status: DISCONTINUED | OUTPATIENT
Start: 2022-11-09 | End: 2022-11-10

## 2022-11-09 RX ADMIN — OXYCODONE HYDROCHLORIDE 10 MG: 10 TABLET ORAL at 22:59

## 2022-11-09 RX ADMIN — OXYCODONE 5 MG: 5 TABLET ORAL at 03:54

## 2022-11-09 RX ADMIN — ACETAMINOPHEN 650 MG: 325 TABLET, FILM COATED ORAL at 03:54

## 2022-11-09 RX ADMIN — SERTRALINE HYDROCHLORIDE 100 MG: 100 TABLET ORAL at 10:07

## 2022-11-09 RX ADMIN — CARVEDILOL 25 MG: 25 TABLET, FILM COATED ORAL at 16:23

## 2022-11-09 RX ADMIN — MORPHINE SULFATE 4 MG: 4 INJECTION INTRAVENOUS at 06:14

## 2022-11-09 RX ADMIN — CARVEDILOL 25 MG: 25 TABLET, FILM COATED ORAL at 07:30

## 2022-11-09 RX ADMIN — INSULIN LISPRO 2 UNITS: 100 INJECTION, SOLUTION INTRAVENOUS; SUBCUTANEOUS at 07:28

## 2022-11-09 RX ADMIN — FUROSEMIDE 40 MG: 40 TABLET ORAL at 10:07

## 2022-11-09 RX ADMIN — SODIUM CHLORIDE 250 ML: 0.9 INJECTION, SOLUTION INTRAVENOUS at 14:55

## 2022-11-09 RX ADMIN — HYDRALAZINE HYDROCHLORIDE 10 MG: 20 INJECTION, SOLUTION INTRAMUSCULAR; INTRAVENOUS at 17:05

## 2022-11-09 RX ADMIN — LORAZEPAM 0.5 MG: 0.5 TABLET ORAL at 07:46

## 2022-11-09 RX ADMIN — OXYCODONE HYDROCHLORIDE 10 MG: 10 TABLET ORAL at 17:06

## 2022-11-09 RX ADMIN — POTASSIUM CHLORIDE 20 MEQ: 1500 TABLET, EXTENDED RELEASE ORAL at 11:29

## 2022-11-09 RX ADMIN — PANTOPRAZOLE SODIUM 40 MG: 40 TABLET, DELAYED RELEASE ORAL at 06:14

## 2022-11-09 RX ADMIN — GABAPENTIN 200 MG: 100 CAPSULE ORAL at 10:09

## 2022-11-09 RX ADMIN — AMLODIPINE BESYLATE 10 MG: 10 TABLET ORAL at 10:07

## 2022-11-09 RX ADMIN — NYSTATIN: 100000 POWDER TOPICAL at 10:09

## 2022-11-09 RX ADMIN — DOXAZOSIN 1 MG: 1 TABLET ORAL at 10:07

## 2022-11-09 RX ADMIN — OXYCODONE HYDROCHLORIDE 10 MG: 10 TABLET ORAL at 07:54

## 2022-11-09 RX ADMIN — GABAPENTIN 200 MG: 100 CAPSULE ORAL at 17:04

## 2022-11-09 RX ADMIN — INSULIN GLARGINE 25 UNITS: 100 INJECTION, SOLUTION SUBCUTANEOUS at 21:30

## 2022-11-09 RX ADMIN — MORPHINE SULFATE 4 MG: 4 INJECTION INTRAVENOUS at 14:14

## 2022-11-09 RX ADMIN — OXYCODONE HYDROCHLORIDE 10 MG: 10 TABLET ORAL at 12:16

## 2022-11-09 RX ADMIN — ATORVASTATIN CALCIUM 40 MG: 40 TABLET, FILM COATED ORAL at 16:23

## 2022-11-09 RX ADMIN — MORPHINE SULFATE 4 MG: 4 INJECTION INTRAVENOUS at 10:15

## 2022-11-09 RX ADMIN — NYSTATIN: 100000 POWDER TOPICAL at 17:04

## 2022-11-09 RX ADMIN — HEPARIN SODIUM 18 UNITS/KG/HR: 10000 INJECTION, SOLUTION INTRAVENOUS at 14:06

## 2022-11-09 RX ADMIN — IOHEXOL 145 ML: 350 INJECTION, SOLUTION INTRAVENOUS at 15:16

## 2022-11-09 RX ADMIN — INSULIN LISPRO 2 UNITS: 100 INJECTION, SOLUTION INTRAVENOUS; SUBCUTANEOUS at 16:28

## 2022-11-09 RX ADMIN — CLOPIDOGREL BISULFATE 75 MG: 75 TABLET ORAL at 10:07

## 2022-11-09 NOTE — OCCUPATIONAL THERAPY NOTE
Occupational Therapy Progress Note(time=1210-1250)     Patient Name: Ashwin AGUILAR Date: 11/9/2022  Problem List  Principal Problem:    Non healing left heel wound  Active Problems:    Acute respiratory failure with hypoxia (HCC)    Depression, recurrent (HCC)    Benign essential hypertension    Type 2 diabetes mellitus with hyperglycemia, with long-term current use of insulin (HCC)    Wound of lower extremity    Hypertensive urgency    PAD (peripheral artery disease) (HCC)    Generalized edema due to fluid overload    Acute on chronic anemia            11/09/22 1250   Note Type   Note Type Treatment   Pain Assessment   Pain Assessment Tool FLACC   Pain Rating: FLACC (Rest) - Face 0   Pain Rating: FLACC (Rest) - Legs 0   Pain Rating: FLACC (Rest) - Activity 0   Pain Rating: FLACC (Rest) - Cry 1   Pain Rating: FLACC (Rest) - Consolability 0   Score: FLACC (Rest) 1   Restrictions/Precautions   Weight Bearing Precautions Per Order Yes   RLE Weight Bearing Per Order WBAT  (with sx shoe)   LLE Weight Bearing Per Order WBAT  (with heel unloading shoe)   Other Precautions Fall Risk;Pain;Telemetry;Multiple lines;WBS   ADL   Where Assessed Edge of bed   Eating Assistance 4  Minimal Assistance   Grooming Assistance 4  Minimal Assistance   UB Bathing Assistance 3  Moderate Assistance   LB Bathing Assistance 2  Maximal Assistance   UB Dressing Assistance 3  Moderate Assistance   LB Dressing Assistance 2  Maximal Assistance   Toileting Assistance  1  Total Assistance   Bed Mobility   Rolling R 2  Maximal assistance   Additional items Assist x 2; Increased time required;Verbal cues;LE management   Rolling L 2  Maximal assistance   Additional items Assist x 2; Increased time required;Verbal cues;LE management   Supine to Sit 2  Maximal assistance   Additional items Assist x 2; Increased time required;Verbal cues;LE management   Sit to Supine 1  Dependent   Additional items Assist x 2   Transfers   Sit to Stand 1 Dependent   Functional Mobility   Functional Mobility   (recommend hoyerlift for OOB with nsg)   Subjective   Subjective "You won't give up will you?"   Cognition   Overall Cognitive Status Impaired   Arousal/Participation Alert   Attention Attends with cues to redirect   Orientation Level Oriented to person;Oriented to place;Oriented to time   Memory Decreased recall of precautions;Decreased short term memory   Following Commands Follows one step commands with increased time or repetition   Activity Tolerance   Activity Tolerance Patient limited by fatigue;Patient limited by pain   Medical Staff Made Aware nsg, P T  Assessment   Assessment Pt seen for 40 min tx session with focus on functional balance, functional mobility, ADL status, transfer safety, education, and cognition  Pt able to tolerate EOB sitting with f/f- sitting balance  Pt required heavy assistance with all functional mobility tasks; dependent with sit-stand  Pt demonstrate need for heavy assistance with UE and LE ADLs  Pt able to demonstrate good b/l UE AROM, but limited strength to assist with functional mobility tasks  Slight cognitive deficits noted(i e memory, problem-solving)  Pt continues to demonstrate appropriateness for inpt rehab to improve overall level of independence  Will continue  Plan   Treatment Interventions ADL retraining;Functional transfer training;UE strengthening/ROM; Cognitive reorientation; Endurance training;Patient/family training;Equipment evaluation/education; Compensatory technique education;Continued evaluation   Goal Expiration Date 11/22/22   OT Treatment Day 5   OT Frequency 3-5x/wk   Recommendation   OT Discharge Recommendation Post acute rehabilitation services   AM-PAC Daily Activity Inpatient   Lower Body Dressing 2   Bathing 2   Toileting 1   Upper Body Dressing 2   Grooming 3   Eating 3   Daily Activity Raw Score 13   Daily Activity Standardized Score (Calc for Raw Score >=11) 32 03   AM-PAC Applied Cognition Inpatient   Following a Speech/Presentation 3   Understanding Ordinary Conversation 3   Taking Medications 3   Remembering Where Things Are Placed or Put Away 3   Remembering List of 4-5 Errands 3   Taking Care of Complicated Tasks 3   Applied Cognition Raw Score 18   Applied Cognition Standardized Score 38 07   Cherry Anderson

## 2022-11-09 NOTE — PLAN OF CARE
Problem: OCCUPATIONAL THERAPY ADULT  Goal: Performs self-care activities at highest level of function for planned discharge setting  See evaluation for individualized goals  Description: Treatment Interventions: ADL retraining, Functional transfer training, UE strengthening/ROM, Endurance training, Cognitive reorientation, Patient/family training, Equipment evaluation/education, Compensatory technique education, Energy conservation, Activityengagement          See flowsheet documentation for full assessment, interventions and recommendations  Note: Limitation: Decreased ADL status, Decreased UE strength, Decreased Safe judgement during ADL, Decreased endurance, Decreased high-level ADLs  Prognosis: Good  Assessment: Pt seen for 40 min tx session with focus on functional balance, functional mobility, ADL status, transfer safety, education, and cognition  Pt able to tolerate EOB sitting with f/f- sitting balance  Pt required heavy assistance with all functional mobility tasks; dependent with sit-stand  Pt demonstrate need for heavy assistance with UE and LE ADLs  Pt able to demonstrate good b/l UE AROM, but limited strength to assist with functional mobility tasks  Slight cognitive deficits noted(i e memory, problem-solving)  Pt continues to demonstrate appropriateness for inpt rehab to improve overall level of independence  Will continue       OT Discharge Recommendation: Post acute rehabilitation services

## 2022-11-09 NOTE — ASSESSMENT & PLAN NOTE
65year old female former smoker w/HTN, HLD, uncontrolled type II DM (A1c 9 8), hx CVA, presenting with nonhealing extensive L heel ulceration and R hallux and 5th digit ulceration  Vascular surgery consulted for input  S/p multiple b/l endovascular interventions  HIT  R hemispheric CVA    Recommendations:  -Bilateral tissue loss in the setting of uncontrolled type II DM and NYDIA on admission, now s/p angiogram w/intervention  Groin access is soft without hematoma or ecchymosis  -Now s/p multiple bilateral lower extremity endovascular interventions, c/b atheroembolic event to the RLE and JUSTIN  -Patient will require bilateral lower extremity major amputations  Would recommend holding off on the RLE to allow poor perfused superficial tissue loss to demarcate/slough to allow us to determine level of amputation  Do recommend proceeding with LLE amputation next week  -Extensive conversation with patient regarding level of LLE amputation  Could consider attempting LLE BKA with possibility of requiring more proximal amputation (revision to AKA) vs proceeding with AKA  However, patient wishes to forgo attempt at [address] and proceed with L AKA  Scheduling in process for next week  -Cardiology consulted  Appreciate recommendations  -Medical management with ASA, statin, Plavix  -Medical management and glucose control per SLIM   Continue to trend Hgb  -Cont argatroban drip in setting of HIT  -Patient seen and examined with Dr Annmarie Gruber

## 2022-11-09 NOTE — PROGRESS NOTES
Progress Note - Vascular Surgery   Jason Sarmiento 65 y o  female [id number]  Unit/Bed#:ICU 14 Encounter: [id number]      Assessment:    65 y o  with PMH HTN, DM, HLD, B/L CLTI presenting with LLE Heel Eschar, RLE gangrenous 5th toe, sepsis, NYDIA  Vascular surgery consulted for LLE CLTI and RLE CLTI     10/31: LLE Angio c R ALLISON PTA, LLE SFA IVL/FATOU  11/7: RLE Angio c CFA DCB PTA, Atherectomy, SFA DCB/FATOU, Pop DCB    Plan:  - With significant pain in both feet/legs this AM; worse in right  - Likely significant athero-embolic event to RLE   - repeat arterial duplex/SHARIF this AM  - Will check CK  - Diet As tolerated  - Observing off Abx per ID recs  - Cont Hep Gtt; Will require DOAC and Plavix at Discharge   - Plavix for recent LLE SFA FATOU, RLE SFA FATOU  - Recommend upgrade to ICU for close neurovascular monitoring of lower extremities       Subjective:    Pt s/e  Overnight with significant lower extremity pain, pain limiting motion, unable to get out of bed due to leg pain  Pt complains of severe pain in both legs, worse in the right  Tolerating diet  Not OOB  Voiding  Denies n/v, sob, chest pain, f/c  Admits to some weakness in right leg  Denies numbness, sensory changes in bilateral feet    I/Os:  I/O last 3 completed shifts: In: 1509 2 [I V :1173 4; IV Piggyback:335 8]  Out: 8307 [Urine:2860]  I/O this shift:  In: -   Out: 425 [Urine:425]    Review of Systems   All other systems reviewed and are negative  Vitals:  /66   Pulse 80   Temp 97 6 °F (36 4 °C) (Oral)   Resp 12   Ht 5' 9" (1 753 m)   Wt 74 4 kg (164 lb)   SpO2 96%   BMI 24 22 kg/m²         Physical Exam  Vitals and nursing note reviewed  Constitutional:       General: She is not in acute distress  Appearance: She is well-developed  She is obese  She is ill-appearing  She is not toxic-appearing or diaphoretic  HENT:      Head: Normocephalic and atraumatic  Eyes:      General: No scleral icterus  Conjunctiva/sclera: Conjunctivae normal    Cardiovascular:      Rate and Rhythm: Normal rate and regular rhythm  Heart sounds: No murmur heard  Comments: RLE Dop DP/PT  LLE Dop DP  Pulmonary:      Effort: Pulmonary effort is normal  No respiratory distress  Breath sounds: Normal breath sounds  No wheezing  Abdominal:      General: There is no distension  Palpations: Abdomen is soft  Tenderness: There is no abdominal tenderness  There is no guarding  Hernia: No hernia is present  Musculoskeletal:         General: Swelling and tenderness (RLE TTP) present  Cervical back: Neck supple  Right lower leg: Edema (mild RLE edema ) present  Left lower leg: No edema  Comments: B/L groin soft, no hematoma, no ecchymosis  Significant erythema in pannus    Skin:     General: Skin is warm  Capillary Refill: Capillary refill takes less than 2 seconds  Comments: LLE heel dressing c/di/  RLE 5th toe dry gangrene, RLE foot with reticular mottling on dorsum of foot   Neurological:      General: No focal deficit present  Mental Status: She is alert and oriented to person, place, and time  Mental status is at baseline  Comments: LLE motor 4/5, sensory grossly intact  RLE motor 4/5, sensory grossly intact  Motor exam limited in dorsi/plantar flexion secondary to pain            Imaging:I have personally reviewed pertinent reports        Lab Results and Cultures:   CBC with diff:   Lab Results   Component Value Date    WBC 11 47 (H) 07/23/2022    HGB 10 4 (L) 07/23/2022    HCT 32 0 (L) 07/23/2022     (H) 07/23/2022     07/23/2022    MCH 33 5 07/23/2022    MCHC 32 5 07/23/2022    RDW 13 1 07/23/2022    MPV 9 2 07/23/2022    NRBC 0 07/21/2022   ,   BMP/CMP:  Lab Results   Component Value Date    SODIUM 133 (L) 07/23/2022    K 4 1 07/23/2022     07/23/2022    CO2 21 07/23/2022    CO2 24 05/05/2022    BUN 6 07/23/2022    CREATININE 0 53 (L) 07/23/2022 GLUCOSE 103 05/05/2022    CALCIUM 8 8 07/23/2022     (H) 07/23/2022     (H) 07/23/2022    ALKPHOS 161 (H) 07/23/2022    EGFR 109 07/23/2022   ,   Lipid Panel: No results found for: CHOL,   Coags:   Lab Results   Component Value Date    PTT 54 (H) 07/23/2022    INR 0 94 07/22/2022   ,     Blood Culture:   Lab Results   Component Value Date    BLOODCX No Growth at 24 hrs  07/21/2022   ,   Urinalysis: No results found for: COLORU, CLARITYU, SPECGRAV, PHUR, LEUKOCYTESUR, NITRITE, PROTEINUA, GLUCOSEU, KETONESU, BILIRUBINUR, BLOODU,   Urine Culture: No results found for: URINECX,   Wound Culure: No results found for: WOUNDCULT    Medications:  Current Facility-Administered Medications   Medication Dose Route Frequency   • acetaminophen (TYLENOL) tablet 650 mg  650 mg Oral Q6H PRN   • alteplase (CATHFLO) 10 mg in sodium chloride 0 9 % 250 mL infusion  1 mg/hr Intracatheter Continuous   • amLODIPine (NORVASC) tablet 10 mg  10 mg Oral QPM   • aspirin (ECOTRIN LOW STRENGTH) EC tablet 81 mg  81 mg Oral Daily   • clopidogrel (PLAVIX) tablet 75 mg  75 mg Oral Daily   • ezetimibe (ZETIA) tablet 10 mg  10 mg Oral Daily   • folic acid (FOLVITE) tablet 1 mg  1 mg Oral Daily   • heparin (porcine) 25,000 units in 0 45% NaCl 250 mL infusion (premix)  3-30 Units/kg/hr (Order-Specific) Intravenous Titrated   • heparin (porcine) 25,000 units in 0 45% NaCl 250 mL infusion (premix)  500 Units/hr Intravenous Titrated   • heparin (porcine) injection 2,800 Units  2,800 Units Intravenous Q1H PRN   • heparin (porcine) injection 5,600 Units  5,600 Units Intravenous Q1H PRN   • heparin 1000 units in 500 mL infusion (premix) for sheath patency  20 Units/hr Intravenous Continuous   • HYDROmorphone HCl (DILAUDID) injection 0 2 mg  0 2 mg Intravenous Q4H PRN   • metoprolol succinate (TOPROL-XL) 24 hr tablet 25 mg  25 mg Oral Daily   • multi-electrolyte (PLASMALYTE-A/ISOLYTE-S PH 7 4) IV solution  75 mL/hr Intravenous Continuous   • multivitamin-minerals (CENTRUM) tablet 1 tablet  1 tablet Oral Daily   • oxyCODONE (ROXICODONE) IR tablet 2 5 mg  2 5 mg Oral Q4H PRN   • oxyCODONE (ROXICODONE) IR tablet 5 mg  5 mg Oral Q4H PRN   • pantoprazole (PROTONIX) EC tablet 40 mg  40 mg Oral Early Morning   • pravastatin (PRAVACHOL) tablet 80 mg  80 mg Oral Daily With Dinner   • thiamine tablet 100 mg  100 mg Oral Daily

## 2022-11-09 NOTE — PLAN OF CARE
Problem: PHYSICAL THERAPY ADULT  Goal: Performs mobility at highest level of function for planned discharge setting  See evaluation for individualized goals  Description: Treatment/Interventions: Functional transfer training, LE strengthening/ROM, Therapeutic exercise, Endurance training, Patient/family training, Equipment eval/education, Bed mobility, Compensatory technique education, Gait training, Continued evaluation, Spoke to nursing, Spoke to MD, Spoke to case management, OT          See flowsheet documentation for full assessment, interventions and recommendations  Outcome: Progressing  Note: Prognosis: Guarded (pending medical course )  Problem List: Decreased strength, Decreased range of motion, Decreased endurance, Impaired balance, Decreased mobility, Impaired sensation, Obesity, Decreased skin integrity, Pain  Assessment: Aurelia Mcneil is seen for PT re-evaluation and treatment following s/p RLE Angio c CFA DCB PTA, Atherectomy, SFA DCB/FATOU, Pop DCB (DOS: 11/7/22)  Recent s/p LLE Angio c R ALLISON PTA, LLE SFA IVL/FATOU(DOS: 10/31/22)  Initially admitted with LLE heel eschar and RLE gangrenous 5th toe with sepsis and NYDIA  Per podiatry: Weight bearing status: WBAT in heel offloading shoe left foot; weight bearing as tolerated right foot  Pt reports significant B/L LE pain R > L  Significantly compromised mobility, strength, ROM, balance, functional mobility, activity tolerance and ability to perform transfers/ambulation at this time  Max A of 2 to reach EOB with increased time and cues for technique  Tolerated EOB sitting x 5 minutes, balance improving over time with positioning and cues  Attempt to stand unsuccessful despite 3 attempts  Minimal lateral scoot performed with max A of 2 and minimal movement toward HOB  Dependent of 2 to return to supine and reposition  BP supine 175/75 and /64  Very limited tolerance to activity and variable effor with mobility tasks    Will require continued skilled PT in order to address impairments and optimize functional outcomes  The patient's AM-PAC Basic Mobility Inpatient Short Form Raw Score is 6  A Raw score of less than or equal to 16 suggests the patient may benefit from discharge to post-acute rehabilitation services  Please also refer to the recommendation of the Physical Therapist for safe discharge planning  At this time STR is recommended when medically appropriate  Barriers to Discharge: Inaccessible home environment, Decreased caregiver support  Barriers to Discharge Comments: alone  PT Discharge Recommendation: Post acute rehabilitation services    See flowsheet documentation for full assessment

## 2022-11-09 NOTE — UTILIZATION REVIEW
Continued Stay Review    Date: 11/9/2022                        Current Patient Class: inpatient    Current Level of Care: critical care    HPI:62 y o  female initially admit 10/22/2022 Inpatient due to Non healing left heel wound, Hypertensive urgency, Abnormal EKG, Sepsis, Hypokalemia    Assessment/Plan:   Vascular Surgery   10/31: LLE Angio c R ALLISON PTA, LLE SFA IVL/FATOU  11/7: RLE Angio c CFA DCB PTA, Atherectomy, SFA DCB/FATOU, Pop DCB  significant pain in both feet/legs this AM; worse in right   Likely significant athero-embolic event to RLE , repeat arterial duplex/SHARIF this AM,  check CK   Observing off Abx per ID recs  Cont Hep Gtt;  DOAC and Plavix at Discharge   Plavix for recent LLE SFA FATOU, RLE SFA FATOU  Duplex shows left CFA with monophasic waveform concerning for left iliac stenosis  Right side showing SFA occlusion  Unfortunately unsure as to the prognosis of her disease  Endovascular therapy has proven to be difficult in her and the option of open surgery remains to be an extremely high risk option given her habitus, cardiopulmonary issues, A1C and inguinal cutaneous fungal infection   Obtain  CTA of the abdominal aorta with runoff to get a good idea of the issues happening in the iliac system and to get a good visual on the right CFA  Once scan is done  discuss salveability of the limbs  NEPHROLOGY   baseline cr 1 0, Monitor for SANTOS, REMAINS OFF ivf, monitor I/O,  Lisinopril on hold; BP above goal   outpatient regimen includes lisinopril 40 mg daily, Toprol-XL 25 mg daily, amlodipine 10 mg daily  continue amlodipine 10 mg daily, Coreg 25 mg b i d   give Lasix 40 mg p o  One dose today along with potassium chloride 20 mEq once  Start doxazosin 1 mg daily    Seems mildly volume overloaded, O2 requirement overall improving, remains on 2 to 3 L O2 via nasal cannula  Vital Signs:   Date/Time Temp Pulse Resp BP MAP (mmHg) Arterial Line BP MAP SpO2 Calculated FIO2 (%) - Nasal Cannula O2 Flow Rate (L/min) Nasal Cannula O2 Flow Rate (L/min) O2 Device Cardiac (WDL) Patient Position - Orthostatic VS   11/09/22 1300 -- 70 15 158/61 87 -- -- 93 % -- -- -- -- -- --   11/09/22 1119 98 1 °F (36 7 °C) -- -- -- -- -- -- -- -- -- -- -- -- --   11/09/22 1100 -- 72 14 188/79 Abnormal  114 -- -- 94 % -- -- -- -- -- --   11/09/22 1007 -- -- -- 161/72 -- -- -- -- -- -- -- -- -- --   11/09/22 1000 -- 72 19 161/72 108 -- -- 94 % -- -- -- -- -- --   11/09/22 0900 -- 72 21 187/77 Abnormal  121 -- -- 94 % -- -- -- -- -- --   11/09/22 0800 -- 74 20 191/56 Abnormal  86 -- -- 94 % -- -- -- -- -- --   11/09/22 0730 98 5 °F (36 9 °C) 76 -- 170/57 -- -- -- -- -- -- -- -- -- --   11/09/22 0700 -- 74 20 170/57 89 -- -- 93 % -- 4 L/min -- Mid flow nasal cannula -- --   11/09/22 0600 -- 76 21 152/64 82 -- -- 95 % -- -- -- -- -- --   11/09/22 0500 -- 72 19 142/58 86 -- -- 93 % -- -- -- -- -- --   11/09/22 0400 98 1 °F (36 7 °C) 72 20 169/67 100 -- -- 93 % -- -- -- Mid flow nasal cannula -- Lying   11/09/22 0300 -- 72 36 Abnormal  172/73 Abnormal  96 -- -- 95 % -- -- -- -- -- --   11/09/22 0200 -- 72 20 163/83 115 -- -- 93 % -- -- -- -- -- --   11/09/22 0100 -- 72 23 Abnormal  152/68 94 -- -- 92 % -- -- -- -- -- --   11/09/22 0000 98 1 °F (36 7 °C) 72 20 155/62 93 -- -- 91 % -- 4 L/min -- Mid flow nasal cannula -- Lying         Pertinent Labs/Diagnostic Results:    11/9/2022 VAS LL arterial duplex CONCLUSION:  Impression:  RIGHT LOWER LIMB:  Monophasic waveforms in the common femoral artery suggesting more proximal  disease with diffuse disease throughout the remaining portions of the  femoral-popliteal arteries without significant focal stenosis  Findings suggests  tibioperoneal disease  Ankle/Brachial index: unobtainable secondary to indistinguishable Doppler  signal, previous study 0 51  PVR tracing at the ankle is dampened   The PVR tracing at the metatarsal level  and PPG waveform of the great toe are flat lined, suggesting poor distal  perfusion, therefore, pressures are unobtainable  LEFT LOWER LIMB:  An occlusion of the mid superficial femoral artery with minimal distal  reconstitution  Findings suggests tibioperoneal disease  Ankle/Brachial index: unobtainable secondary to indistinguishable Doppler  signal, previous study 0 74  The PVR tracings at the ankle and metatarsal levels are flat lined with the PPG  waveform of the great toe is flat lined, suggesting poor distal perfusion,  therefore, pressures are unobtainable  10/30/2022 ECHO  Left Ventricle: Left ventricular cavity size is normal  There is mild to moderate concentric hypertrophy  The left ventricular ejection fraction is 60% by visual estimation  Systolic function is normal  Wall motion is normal  Diastolic function is normal   •  Right Ventricle: Right ventricular cavity size is normal  Systolic function is normal   •  Aortic Valve: There is aortic sclerosis without clear visualization of the valve leaflets  •  Mitral Valve: There is mild annular calcification  There is trace regurgitation  •  Tricuspid Valve: There is trace regurgitation  Pulmonary artery systolic pressures are estimated at 31 mm Hg    •  There is no study for comparison      Results from last 7 days   Lab Units 11/08/22 1954   SARS-COV-2  Negative     Results from last 7 days   Lab Units 11/09/22 0439 11/08/22 0532 11/07/22 1520 11/07/22 0437 11/06/22  0458 11/05/22  0541 11/04/22  0503   WBC Thousand/uL 14 83* 16 57* 13 88* 8 74 8 87 9 62 11 80*   HEMOGLOBIN g/dL 8 6* 8 8* 8 7* 7 0* 7 8* 8 0* 8 4*   HEMATOCRIT % 28 2* 28 7* 27 2* 22 8* 25 7* 25 8* 27 1*   PLATELETS Thousands/uL 83* 105* 157 266 266 277 284   NEUTROS ABS Thousands/µL  --   --   --   --  6 21 7 00 8 61*   BANDS PCT %  --   --  2  --   --   --   --          Results from last 7 days   Lab Units 11/09/22 0439 11/08/22 0532 11/07/22  1520 11/07/22 0437 11/06/22  0458   SODIUM mmol/L 141 139 140 137 138   POTASSIUM mmol/L 3 5 3 6 4 3 3 8 3 8   CHLORIDE mmol/L 105 104 104 100 99   CO2 mmol/L 26 24 24 28 30   ANION GAP mmol/L 10 11 12 9 9   BUN mg/dL 17 19 19 19 19   CREATININE mg/dL 1 02 1 20 1 21 1 40* 1 37*   EGFR ml/min/1 73sq m 59 48 48 40 41   CALCIUM mg/dL 7 8* 7 4* 7 5* 8 2* 8 6   CALCIUM, IONIZED mmol/L  --   --  0 99*  --   --    MAGNESIUM mg/dL  --   --  1 7  --   --    PHOSPHORUS mg/dL  --   --  5 6*  --   --      Results from last 7 days   Lab Units 11/07/22  1520   AST U/L 42   ALT U/L 23   ALK PHOS U/L 209*   TOTAL PROTEIN g/dL 6 1*   ALBUMIN g/dL 1 8*   TOTAL BILIRUBIN mg/dL 1 53*     Results from last 7 days   Lab Units 11/09/22  1121 11/09/22  0725 11/08/22  2112 11/08/22  1709 11/08/22  1231 11/08/22  0658 11/08/22  0537 11/08/22  0033 11/07/22  2031 11/07/22  1702 11/07/22  1543 11/07/22  0557   POC GLUCOSE mg/dl 142* 152* 169* 185* 192* 209* 202* 235* 234* 205* 244* 141*     Results from last 7 days   Lab Units 11/09/22  0439 11/08/22  0532 11/07/22  1520 11/07/22  0437 11/06/22  0458 11/05/22  0541 11/04/22  0503 11/03/22  0445   GLUCOSE RANDOM mg/dL 158* 216* 207* 135 167* 139 117 74             No results found for: BETA-HYDROXYBUTYRATE   Results from last 7 days   Lab Units 11/07/22  1938   PH ART  7 335*   PCO2 ART mm Hg 43 2   PO2 ART mm Hg 101 1   HCO3 ART mmol/L 22 5   BASE EXC ART mmol/L -3 2   O2 CONTENT ART mL/dL 14 2*   O2 HGB, ARTERIAL % 96 9   ABG SOURCE  Line, Arterial             Results from last 7 days   Lab Units 11/09/22  0439   CK TOTAL U/L 1,911*   CK MB INDEX % 2 1   CK MB ng/mL 39 3*             Results from last 7 days   Lab Units 11/09/22  1016 11/09/22  0336 11/08/22  1858   PTT seconds 84* 80* 55*       Medications:   Scheduled Medications:  amLODIPine, 10 mg, Oral, Daily  atorvastatin, 40 mg, Oral, Daily With Dinner  carvedilol, 25 mg, Oral, BID With Meals  clopidogrel, 75 mg, Oral, Daily  doxazosin, 1 mg, Oral, Daily  gabapentin, 200 mg, Oral, BID  insulin glargine, 25 Units, Subcutaneous, HS  insulin lispro, 2-12 Units, Subcutaneous, TID AC  nystatin, , Topical, BID  pantoprazole, 40 mg, Oral, Early Morning  sertraline, 100 mg, Oral, Daily    Continuous IV Infusions:  heparin (porcine), 3-30 Units/kg/hr (Order-Specific), Intravenous, Titrated    PRN Meds:  acetaminophen, 650 mg, Oral, Q6H PRN  albuterol, 2 puff, Inhalation, Q4H PRN  heparin (porcine), 4,400 Units, Intravenous, Q1H PRN  heparin (porcine), 8,800 Units, Intravenous, Q1H PRN  hydrALAZINE, 10 mg, Intravenous, Q6H PRN  LORazepam, 0 5 mg, Oral, Q8H PRN  metoclopramide, 5 mg, Intravenous, Q6H PRN  morphine injection, 4 mg, Intravenous, Q4H PRN  ondansetron, 4 mg, Intravenous, Q6H PRN  oxyCODONE, 5 mg, Oral, Q4H PRN   Or  oxyCODONE, 10 mg, Oral, Q4H PRN    Discharge Plan: [facility]  Network Utilization Review Department  ATTENTION: Please call with any questions or concerns to [phone] and carefully listen to the prompts so that you are directed to the right person  All voicemails are confidential   Karolina Soriaon all requests for admission clinical reviews, approved or denied determinations and any other requests to dedicated fax number below belonging to the campus where the patient is receiving treatment   List of dedicated fax numbers for the Facilities:  [address] DENIALS (Administrative/Medical Necessity) [phone]   [address] (Maternity/NICU/Pediatrics) [phone]   [address] [phone]   [facility] [address] [phone]   [address] [place] [id number] [place] [address] [facility] [phone] [address] [facility] [phone]   [facility] [phone]   [address] [place] [address]   [address] [phone]

## 2022-11-09 NOTE — CASE MANAGEMENT
Case Management Discharge Planning Note    Patient name Reuben Palencia  Location ICU 14/ICU 14 MRN [id number]  : 1960 Date 2022       Current Admission Date: 10/21/2022  Current Admission Diagnosis:Non healing left heel wound   Patient Active Problem List    Diagnosis Date Noted   • Acute on chronic anemia 10/30/2022   • Generalized edema due to fluid overload 10/27/2022   • PAD (peripheral artery disease) ([address] ) 10/25/2022   • Non healing left heel wound 10/22/2022   • Hypertensive urgency 10/22/2022   • Wound of lower extremity 10/21/2022   • Epigastric pain 2022   • Type 2 diabetes mellitus with hyperglycemia, with long-term current use of insulin ([facility] [address] ) 10/18/2021   • Hyperparathyroidism ([facility] [address] ) 10/18/2021   • 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 ([facility] [address] ) 2021   • 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   • Moderate protein-calorie malnutrition ([address] ) 2020   • Acute colitis 2020   • Arthritis 2019   • Depression, recurrent ([address] ) 2018   • Class 3 severe obesity due to excess calories with serious comorbidity and body mass index (BMI) of 40 0 to 44 9 in adult ([facility] [address] ) 2018   • Acute respiratory failure with hypoxia ([address] ) 2018   • Esophageal reflux 2018   • Uncontrolled type 2 diabetes with neuropathy 2018   • Diabetic peripheral neuropathy ([facility] [address] )    • Systolic murmur    • Cerebral infarction ([address] ) 2015   • Anxiety 2015   • Vitamin D deficiency 2015   • Benign essential hypertension 2012   • Familial combined hyperlipidemia 2012      LOS (days): 18  Geometric Mean LOS (GMLOS) (days):   Days to GMLOS:     OBJECTIVE:  Risk of Unplanned Readmission Score: 26 83         Current admission status: Inpatient   Preferred Pharmacy:   [facility]/pharmacy #[id number]- Edlynnio C C/ Chaitanya Bo  [facility], [address] - [address] [facility] [address]  [address] [place] [address]  [address]  Phone: [phone] Fax: [phone]    Primary Care Provider: ALEX Morocho    Primary Insurance: BLUE CROSS  Secondary Insurance:     DISCHARGE DETAILS:    Discharge planning discussed with[de-identified] Patient, patient's daughter  Freedom of Choice: Yes  Comments - Freedom of Choice: Patient unsure which facility she would like for STR, asked CM to call her family to discuss as they were all discussing options yesterday during visitation  CM called daughter who stated she will speak with patient's sister and determine which facility they would like to use  CM to follow up with family tomorrow for final choice    CM contacted family/caregiver?: Yes             Contacts  Patient Contacts: Edy Burns (dtr)  Relationship to Patient[de-identified] Family  Contact Method: Phone  Phone Number: [phone]  Reason/Outcome: Emergency Contact, Discharge Planning, Referral              Other Referral/Resources/Interventions Provided:  Interventions: Short Term Rehab         Treatment Team Recommendation: Short Term Rehab  Discharge Destination Plan[de-identified] Short Term Rehab  Transport at Discharge : BLS Ambulance

## 2022-11-09 NOTE — PHYSICAL THERAPY NOTE
PT RE-EVALUATION 12:10-12:25 ( 15 minutes)  Treat:  12:25-12:50 ( 25 minutes)  Both included in note below  65 y o     [id number]    Hypokalemia [E87 6]  UTI (urinary tract infection) [N39 0]  OREILLY (dyspnea on exertion) [R06 09]  Hypertension [I10]  Necrotic toes (HCC) [I96]  Non healing left heel wound [S91 302A]  Open wound of foot, complicated, left, initial encounter [S91 302A]  Sepsis ([facility] [address] ) [A41 9]    Past Medical History:   Diagnosis Date    Anxiety     Depression     Diabetes ([facility] [address] )     Diabetes mellitus ([facility] [address] )     Esophageal reflux     28 APR 2015 RESOLVED    Hyperlipidemia     Hypertension     Low back pain     sciatica    Pneumonia 2019    Stroke ([facility] [address] ) 12/2015    small vessel, L frontal corona radiata  Rx asa 81, Lipitor 40, risk modification    Vitamin D deficiency          Past Surgical History:   Procedure Laterality Date    ARTERIOGRAM Right 11/7/2022    Procedure: -Right lower extremity angiogram -Right CFA balloon angioplasty with [id number]  [employer]  -Right CFA DCB with 6x40 Bard Lutonix -Right CFA atherectomy with Jetstream and distal embolization protection with 7mm Spider FX -Right SFA/Pop angioplasty with 4x40 Chololate balloon -Right SFA/Pop DCB with 5x150 Bard Lutonix -Right SFA stent with 6x80 W W  [employer] x2 -R    COLONOSCOPY      IR AORTAGRAM WITH RUN-OFF  10/31/2022      11/09/22 1210   PT Last Visit   PT Visit Date 11/09/22   Note Type   Note type Re-Evaluation  (and treatment )   Pain Assessment   Pain Score 10 - Worst Possible Pain   Restrictions/Precautions   RLE Weight Bearing Per Order WBAT  (surgical shoe)   LLE Weight Bearing Per Order WBAT  (with heel offloading globoped shoe)   Braces or Orthoses Other (Comment)  (globoped L, surgical shoe R)   Other Precautions WBS; Fall Risk;Pain;Telemetry;Multiple lines   Home Living   [place] [id number] to live on main level with bedroom/bathroom; Laundry in basement;Stairs to enter with rails  (3 LELO)   Bathroom Shower/Tub Walk-in shower   Bathroom Toilet Standard   Bathroom Equipment Grab bars around toilet   [employer]   Additional Comments resides alone in home with one floor living except laundry  Prior Function   Level of [place] Independent with ADLs; Independent with functional mobility   Lives With Alone   Receives Help From Family   IADLs Independent with driving; Independent with meal prep   Falls in the last 6 months 0   Comments Prior to admission independent with occasional use of cane  Primarily without AD use  General   Additional Pertinent History pt admitted 10/21/22 for non healing L wound  nwb LLE and wbat RLE per podiatry  activity orders per podiatry  PMHx significant for DM, newly dx PVD, anxiety, depression, stroke   PT re-eval following recent Agram on RLE  WBS remains the same  RE-eval and establish goals and POC updated  Family/Caregiver Present No   Cognition   Overall Cognitive Status Impaired   Attention Attends with cues to redirect   Orientation Level Oriented to person;Oriented to place;Oriented to time   Following Commands Follows one step commands with increased time or repetition   Comments Flat affect  Significant pain noted  Variable effort with mobility tasks  Subjective   Subjective Initially declining mobility 2* pain  After encouragement agreeable to try EOB     RUE Assessment   RUE Assessment WFL  (as observed with reach/grasp at least 3/5)   LUE Assessment   LUE Assessment WFL  (as observed with reach/grasp at least 3/5)   RLE Assessment   RLE Assessment X  (pain limiting)   Strength RLE   R Hip Flexion 1/5   R Knee Extension 2-/5   R Ankle Dorsiflexion 1/5   R Ankle Plantar Flexion 0/5   LLE Assessment   LLE Assessment X  (pain)   Strength LLE   L Hip Flexion 1/5   L Knee Extension 3-/5   L Ankle Dorsiflexion 2-/5   L Ankle Plantar Flexion 2-/5   Light Touch   RLE Light Touch Grossly intact   LLE Light Touch Grossly intact   Bed Mobility   Rolling R 2  Maximal assistance   Additional items Assist x 2; Increased time required;Verbal cues; Bedrails   Rolling L 2  Maximal assistance   Additional items Assist x 2; Increased time required;Verbal cues;LE management; Bedrails   Supine to Sit 2  Maximal assistance   Additional items Assist x 2; Increased time required;Verbal cues;LE management;HOB elevated; Bedrails   Sit to Supine 1  Dependent   Additional items Assist x 2; Increased time required;Verbal cues;LE management   Additional Comments Increased time to complete supine to sit transitions  EOB sitting tolerance x 5 minutes  BP: 163/64 EOB  Supine 175/75   Transfers   Sit to Stand 1  Dependent  (pt unable to complete with 3 trials to stand)   Additional Comments (S)  Unable to complete transition sit to stand  Lateral transfers in seated toward [facility] with limited mobility and max of 2 for one small scoot toward [facility] only  AT this time Oleh Claude is recommendation for OOB  Ambulation/Elevation   Gait pattern Not appropriate   Ambulation/Elevation Additional Comments Unable to ambulate or complete sit to stand despite attempt  Balance   Static Sitting Fair   Dynamic Sitting Poor   Static Standing Zero   Endurance Deficit   Endurance Deficit Yes   Endurance Deficit Description pain, fatigue, weakness, deconditioning  Activity Tolerance   Activity Tolerance Patient limited by pain; Patient limited by fatigue;Treatment limited secondary to medical complications (Comment)   Medical Staff Made Aware NurseDorothea:Pt seen for co-evaluation/treatment with skilled Occupational Therapist 2* clinically unstable/unpredictable presentation, medical complexity, fall risk, cognitive impairments, functional/physical limitations, impaired functional balance, decreased safety awareness, limited activity tolerance which is decline from PLOF and may impact overall functional mobility/mobility safety     Nurse Made Aware yes   Assessment   Prognosis Guarded  (pending medical course )   Problem List Decreased strength;Decreased range of motion;Decreased endurance; Impaired balance;Decreased mobility; Impaired sensation;Obesity; Decreased skin integrity;Pain   Assessment Karin Calixto is seen for PT re-evaluation and treatment following s/p RLE Angio c CFA DCB PTA, Atherectomy, SFA DCB/FATOU, Pop DCB (DOS: 11/7/22)  Recent s/p LLE Angio c R ALLISON PTA, LLE SFA IVL/FATOU(DOS: 10/31/22)  Initially admitted with LLE heel eschar and RLE gangrenous 5th toe with sepsis and NYDIA  Per podiatry: Weight bearing status: WBAT in heel offloading shoe left foot; weight bearing as tolerated right foot  Pt reports significant B/L LE pain R > L  Significantly compromised mobility, strength, ROM, balance, functional mobility, activity tolerance and ability to perform transfers/ambulation at this time  Max A of 2 to reach EOB with increased time and cues for technique  Tolerated EOB sitting x 5 minutes, balance improving over time with positioning and cues  Attempt to stand unsuccessful despite 3 attempts  Minimal lateral scoot performed with max A of 2 and minimal movement toward HOB  Dependent of 2 to return to supine and reposition  BP supine 175/75 and /64  Very limited tolerance to activity and variable effor with mobility tasks  Will require continued skilled PT in order to address impairments and optimize functional outcomes  The patient's AM-PAC Basic Mobility Inpatient Short Form Raw Score is 6  A Raw score of less than or equal to 16 suggests the patient may benefit from discharge to post-acute rehabilitation services  Please also refer to the recommendation of the Physical Therapist for safe discharge planning  At this time STR is recommended when medically appropriate  Barriers to Discharge Inaccessible home environment;Decreased caregiver support   Barriers to Discharge Comments alone   Goals   Patient Goals to have less pain     STG Expiration Date 11/23/22   Short Term Goal #1 14 days: 1)  Pt will perform bed mobility with modA demonstrating appropriate technique 100% of the time in order to improve function  2)  Tolerate EOB sitting x 15 minutes in order to improve endurance to functional tasks  3) PT to see for further transfer and mobility assessment as appropriate and revise mobility goals  4)  Improve overall strength and balance 1/2 grade in order to optimize ability to perform functional tasks and reduce fall risk  5) Increase activity tolerance to 30 minutes in order to improve endurance to functional tasks  6) PT for ongoing patient and family/caregiver education, DME needs and d/c planning in order to promote highest level of function in least restrictive environment  PT Treatment Day 4   Plan   Treatment/Interventions Functional transfer training; Therapeutic exercise; Endurance training;Equipment eval/education;Patient/family training;Bed mobility; Compensatory technique education;Continued evaluation;Spoke to nursing;OT   PT Frequency 3-5x/wk   Recommendation   PT Discharge Recommendation Post acute rehabilitation services   AM-PAC Basic Mobility Inpatient   Turning in Bed Without Bedrails 1   Lying on Back to Sitting on Edge of Flat Bed 1   Moving Bed to Chair 1   Standing Up From Chair 1   Walk in Room 1   Climb 3-5 Stairs 1   Basic Mobility Inpatient Raw Score 6   Highest Level Of Mobility   JH-HLM Goal 2: Bed activities/Dependent transfer   JH-HLM Achieved 3: Sit at edge of bed   End of Consult   Patient Position at End of Consult Supine; All needs within reach         Richie De Luna PT

## 2022-11-09 NOTE — PROGRESS NOTES
NEPHROLOGY PROGRESS NOTE   Irvin Vizcarra 65 y o  female MRN: [id number]  Unit/Bed#: ICU 14 Encounter: [id number]  Reason for Consult: NYDIA    ASSESSMENT AND PLAN:  Mild NYDIA, baseline creatinine 1 0 in early 2022, prior 0 7 to 0 9  -creatinine 1 1 on admission had increased up to 1 4 during hospitalization, now seems to have overall improved 1 0 today    -status post multiple contrast exposure on 10/31, 11/1, and most recent on 11/7   -closely monitor for SANTOS  -now remains off IV fluid  -currently has Jacinto catheter, monitor urine output  -recent UA showed 3+ proteinuria, innumerable RBCs, WBCs, bacteria, very concentrated sample   -would like repeat UA with microscopy once Jacinto catheter is removed      Proteinuria, no recent UPC ratio available, prior urine microalbumin/creatinine ratio 2 7 g in 2017  -suspect in the setting of uncontrolled diabetes, last hemoglobin A1c 9 8 in October 2022  This ranges from 9 to 13 going back to 2019  -on lisinopril 40 mg daily as outpatient, currently remains on hold       Hypertension  -BP remains above goal   -outpatient regimen includes lisinopril 40 mg daily, Toprol-XL 25 mg daily, amlodipine 10 mg daily   -continue to avoid lisinopril  -continue amlodipine 10 mg daily, Coreg 25 mg b i d   -will give Lasix 40 mg p o  One dose today along with potassium chloride 20 mEq once  Also start doxazosin 1 mg daily   -goal SBP 130s     Significant PVD, status post lower extremity angiogram, right CFA balloon angioplasty, stent placement, atherectomy  -vascular surgery follow-up     Clinically seems mildly volume overloaded, O2 requirement overall improving, currently remains on 2 to 3 L O2 via nasal cannula   -most recent echo shows EF 60%, normal diastolic function  -now remains off IV fluid  Lasix dose as above      Discussed above plan in detail with ICU team     SUBJECTIVE:  Patient seen and examined at bedside  Complaining of leg pain issues    Denies nausea, vomiting    OBJECTIVE:  Current Weight: Weight - Scale: 114 kg (251 lb 12 3 oz)  Vitals:    11/09/22 0800   BP: (!) 191/56   Pulse: 74   Resp: 20   Temp:    SpO2: 94%       Intake/Output Summary (Last 24 hours) at 11/9/2022 0853  Last data filed at 11/9/2022 0701  Gross per 24 hour   Intake 999 94 ml   Output 2205 ml   Net -1205 06 ml     Wt Readings from Last 3 Encounters:   11/09/22 114 kg (251 lb 12 3 oz)   10/26/22 98 4 kg (216 lb 14 9 oz)   10/21/22 98 4 kg (217 lb)     Temp Readings from Last 3 Encounters:   11/09/22 98 5 °F (36 9 °C) (Oral)   10/21/22 97 7 °F (36 5 °C) (Temporal)   03/22/22 98 6 °F (37 °C)     BP Readings from Last 3 Encounters:   11/09/22 (!) 191/56   10/21/22 160/90   04/27/22 138/70     Pulse Readings from Last 3 Encounters:   11/09/22 74   10/21/22 99   03/22/22 92        Physical Examination:  General:  Lying in bed, no acute distress   Eyes:  Mild conjunctival pallor present  ENT:  External examination of ears and nose unremarkable  Neck:  No obvious lymphadenopathy appreciated  Respiratory:  Bilateral air entry present  CVS:  S1, S2 present  GI:  Soft nondistended  CNS:  Active alert oriented x3  Skin:  No new rash  Musculoskeletal:  No obvious new gross deformity noted    Medications:    Current Facility-Administered Medications:   •  acetaminophen (TYLENOL) tablet 650 mg, 650 mg, Oral, Q6H PRN, Rayshawn Dewitt MD, 650 mg at 11/09/22 1794  •  albuterol (PROVENTIL HFA,VENTOLIN HFA) inhaler 2 puff, 2 puff, Inhalation, Q4H PRN, Rayshawn Dewitt MD, 2 puff at 10/30/22 7303  •  amLODIPine (NORVASC) tablet 10 mg, 10 mg, Oral, Daily, Rayshawn Dewitt MD, 10 mg at 11/08/22 0915  •  atorvastatin (LIPITOR) tablet 40 mg, 40 mg, Oral, Daily With Dinner, Rayshawn Dewitt MD, 40 mg at 11/08/22 1651  •  carvedilol (COREG) tablet 25 mg, 25 mg, Oral, BID With Meals, Rayshawn Dewitt MD, 25 mg at 11/09/22 7535  •  clopidogrel (PLAVIX) tablet 75 mg, 75 mg, Oral, Daily, David Francis MD, 75 mg at 11/08/22 7618  •  gabapentin (NEURONTIN) capsule 200 mg, 200 mg, Oral, BID, David Francis MD, 200 mg at 11/08/22 1709  •  heparin (porcine) 25,000 units in 0 45% NaCl 250 mL infusion (premix), 3-30 Units/kg/hr (Order-Specific), Intravenous, Titrated, David Francis MD, Last Rate: 19 8 mL/hr at 11/09/22 0701, 18 Units/kg/hr at 11/09/22 0701  •  heparin (porcine) injection 4,400 Units, 4,400 Units, Intravenous, Q1H PRN, David Francis MD, 4,400 Units at 11/08/22 2140  •  heparin (porcine) injection 8,800 Units, 8,800 Units, Intravenous, Q1H PRN, David Francis MD, 4,513 Units at 11/07/22 2152  •  hydrALAZINE (APRESOLINE) injection 10 mg, 10 mg, Intravenous, Q6H PRN, David Francis MD, 10 mg at 10/27/22 1857  •  insulin glargine (LANTUS) subcutaneous injection 25 Units 0 25 mL, 25 Units, Subcutaneous, HS, David Francis MD, 25 Units at 11/08/22 2133  •  insulin lispro (HumaLOG) 100 units/mL subcutaneous injection 2-12 Units, 2-12 Units, Subcutaneous, TID AC, 2 Units at 11/09/22 0728 **AND** Fingerstick Glucose (POCT), , , TID AC, David Francis MD  •  LORazepam (ATIVAN) tablet 0 5 mg, 0 5 mg, Oral, Q8H PRN, David Francis MD, 0 5 mg at 11/09/22 0746  •  metoclopramide (REGLAN) injection 5 mg, 5 mg, Intravenous, Q6H PRN, David Francis MD, 5 mg at 10/23/22 1029  •  morphine injection 4 mg, 4 mg, Intravenous, Q4H PRN, David Francis MD, 4 mg at 11/09/22 3025  •  nystatin (MYCOSTATIN) powder, , Topical, BID, David Francis MD, Given at 11/08/22 1709  •  ondansetron (ZOFRAN) injection 4 mg, 4 mg, Intravenous, Q6H PRN, David Francis MD, 4 mg at 11/08/22 0916  •  oxyCODONE (ROXICODONE) IR tablet 5 mg, 5 mg, Oral, Q4H PRN, 5 mg at 11/09/22 3104 **OR** oxyCODONE (ROXICODONE) immediate release tablet 10 mg, 10 mg, Oral, Q4H PRN, David Francis MD, 10 mg at 11/09/22 7084  •  pantoprazole (PROTONIX) EC tablet 40 mg, 40 mg, Oral, Early Morning, Bob Montes MD, 40 mg at 11/09/22 0660  •  sertraline (ZOLOFT) tablet 100 mg, 100 mg, Oral, Daily, Bob Montes MD, 100 mg at 11/08/22 0915    Laboratory Results:  Results from last 7 days   Lab Units 11/09/22  0439 11/08/22  0532 11/07/22  1520 11/07/22  0437 11/06/22  0458 11/05/22  0541 11/04/22  0503   WBC Thousand/uL 14 83* 16 57* 13 88* 8 74 8 87 9 62 11 80*   HEMOGLOBIN g/dL 8 6* 8 8* 8 7* 7 0* 7 8* 8 0* 8 4*   HEMATOCRIT % 28 2* 28 7* 27 2* 22 8* 25 7* 25 8* 27 1*   PLATELETS Thousands/uL 83* 105* 157 266 266 277 284   SODIUM mmol/L 141 139 140 137 138 138 138   POTASSIUM mmol/L 3 5 3 6 4 3 3 8 3 8 5 0 3 8   CHLORIDE mmol/L 105 104 104 100 99 100 100   CO2 mmol/L 26 24 24 28 30 31 28   BUN mg/dL 17 19 19 19 19 18 17   CREATININE mg/dL 1 02 1 20 1 21 1 40* 1 37* 1 37* 1 38*   CALCIUM mg/dL 7 8* 7 4* 7 5* 8 2* 8 6 8 5 8 8   MAGNESIUM mg/dL  --   --  1 7  --   --   --   --    PHOSPHORUS mg/dL  --   --  5 6*  --   --   --   --        VAS lower limb vein mapping bypass graft   Final Result by Ryann Epperson MD (11/03 0945)      CTA ABDOMEN W RUN OFF W WO CONTRAST   Final Result by Michelle Nichole MD (11/02 2137)   Vascular:   Left lower extremity:   Interval stenting and lithotripsy of the left superficial femoral artery  Right lower extremity:   1  Interval development of a probable flow-limiting dissection flap at the prior arteriotomy site in the right common femoral artery superimposed upon moderate to severe atherosclerotic disease  2   Focal severe atherosclerotic narrowing at the adductor canal with additional moderate atherosclerotic narrowing throughout the remainder of the right superficial femoral artery  The study was marked in EPIC for significant notification         Workstation performed: [id number]         VAS lower limb arterial duplex, limited, unilateral   Final Result by Ayan Castillo MD (11/02 1658)      VAS SHARIF & waveform analysis, multiple levels   Final Result by Ayan Castillo MD (11/01 1541)      XR chest portable   Final Result by Jaylene Clement MD (10/31 1718)      Pulmonary edema has increased since October 26, 2022  Workstation performed: [id number]         IR aortagram with run-off   Final Result by Poli Oro MD (11/01 9219)      Left lower leg arteriogram with proximal and distal SFA shockwave balloon lithotripsy and stent placement, as well as right common iliac artery stent placement  PLAN:      Follow-up arterial duplex  Patient became short of breath after the procedure, with increased O2 requirement  Etiology most likely volume overload secondary to renal prep as well as contrast and fluid was given during the procedure where she was lying flat  Dr Kendal Byrd from the    primary team made aware  Chest x-ray to be obtained upon arrival to the floor  Workstation performed: [id number]         MRI follow up [facility]   Final Result by Kel Drake MD (10/28 4971)      Nondiagnostic study  Workstation performed: [id number]         CTA ABDOMEN W RUN OFF W WO CONTRAST   Final Result by Poli Oro MD (10/28 4125)      1  Moderate stenosis of the mid infrarenal abdominal aorta secondary to calcific atherosclerotic disease  2  LEFT: Up to 50% stenosis of the proximal common iliac artery  The proximal and distal 3rd of the superficial femoral artery demonstrates 50-75% stenosis  Popliteal artery demonstrates greater than 75% stenosis in the P2 segment  Patent    three-vessel runoff extending into the left foot  3  RIGHT: Proximal common iliac artery stenosis of up to 50%  50% stenosis of the common femoral artery  Superficial femoral artery demonstrates up to 50% stenosis within the proximal several centimeters, 50-75% stenosis throughout the distal 3rd of    the vessel    Popliteal artery demonstrates scattered stenoses of 50-75%  Patent three-vessel runoff extending into the right foot  Workstation performed: [id number]         MRI ankle/heel left  wo contrast   Final Result by Kassandra Harman MD (10/27 1949)      Exam moderately limited due to patient motion  There is a large plantar lateral heel ulcer, without underlying soft tissue abscess (series 5 image 5-15 )      Associated with this ulcer, there is only very minimal reactive marrow edema in the calcaneal tubercle evident on T2-weighted sequences (series 3 image 2, series 7 image 8 ) No confluent T1-weighted marrow abnormality or cortical destruction to suggest    osteomyelitis  Workstation performed: [id number]         XR chest portable   Final Result by Enrique Ausitn MD (10/27 1094)      Mild pulmonary vascular congestion  Workstation performed: [id number]         VAS lower limb arterial duplex, complete bilateral   Final Result by Junior Greer MD (10/24 1400)      CT lower extremity w contrast left   Final Result by Zac North DO (10/22 0102)      No acute bony process is seen  Moderate superficial soft tissue edema in the foot, ankle, and leg, superimposed cellulitis considered in the appropriate clinical setting  Correlation with patient's symptoms and laboratory values recommended  No discrete drainable collection    identified  Other findings as above  Workstation performed: [id number]         XR chest 1 view portable   ED Interpretation by Maria Esther Neal PA-C (10/21 2203)   ED wet read:  Possible mild vascular congestion  Final Result by Erendira Cancino MD (00/39 8288)      No acute cardiopulmonary disease  Workstation performed: [id number]         XR foot 3+ views RIGHT   Final Result by Erendira Cancino MD (61/10 3936)      No acute osseous abnormality        Workstation performed: [id number]         IR lower extremity angiogram    (Results Pending)   XR chest portable ICU    (Results Pending)   VAS lower limb arterial duplex, complete bilateral    (Results Pending)       Portions of the record may have been created with voice recognition software  Occasional wrong word or "sound a like" substitutions may have occurred due to the inherent limitations of voice recognition software  Read the chart carefully and recognize, using context, where substitutions have occurred

## 2022-11-10 ENCOUNTER — APPOINTMENT (INPATIENT)
Dept: RADIOLOGY | Facility: HOSPITAL | Age: 62
DRG: 853 | End: 2022-11-10
Payer: COMMERCIAL

## 2022-11-10 PROBLEM — E08.621 DIABETIC ULCER OF HEEL ASSOCIATED WITH DIABETES MELLITUS DUE TO UNDERLYING CONDITION (HCC): Status: ACTIVE | Noted: 2022-11-10

## 2022-11-10 PROBLEM — L97.409 DIABETIC ULCER OF HEEL ASSOCIATED WITH DIABETES MELLITUS DUE TO UNDERLYING CONDITION (HCC): Status: ACTIVE | Noted: 2022-11-10

## 2022-11-10 LAB
ANION GAP SERPL CALCULATED.3IONS-SCNC: 9 MMOL/L (ref 4–13)
APTT PPP: 112 SECONDS (ref 23–37)
APTT PPP: 94 SECONDS (ref 23–37)
ARTERIAL PATENCY WRIST A: YES
BASE EXCESS BLDA CALC-SCNC: 0.7 MMOL/L
BUN SERPL-MCNC: 15 MG/DL (ref 5–25)
CALCIUM SERPL-MCNC: 8 MG/DL (ref 8.3–10.1)
CHLORIDE SERPL-SCNC: 103 MMOL/L (ref 96–108)
CO2 SERPL-SCNC: 28 MMOL/L (ref 21–32)
CREAT SERPL-MCNC: 0.97 MG/DL (ref 0.6–1.3)
ERYTHROCYTE [DISTWIDTH] IN BLOOD BY AUTOMATED COUNT: 15.9 % (ref 11.6–15.1)
GFR SERPL CREATININE-BSD FRML MDRD: 63 ML/MIN/1.73SQ M
GLUCOSE SERPL-MCNC: 125 MG/DL (ref 65–140)
GLUCOSE SERPL-MCNC: 130 MG/DL (ref 65–140)
GLUCOSE SERPL-MCNC: 153 MG/DL (ref 65–140)
GLUCOSE SERPL-MCNC: 155 MG/DL (ref 65–140)
GLUCOSE SERPL-MCNC: 173 MG/DL (ref 65–140)
HCO3 BLDA-SCNC: 24.4 MMOL/L (ref 22–28)
HCT VFR BLD AUTO: 28.1 % (ref 34.8–46.1)
HGB BLD-MCNC: 8.7 G/DL (ref 11.5–15.4)
MCH RBC QN AUTO: 28.2 PG (ref 26.8–34.3)
MCHC RBC AUTO-ENTMCNC: 31 G/DL (ref 31.4–37.4)
MCV RBC AUTO: 91 FL (ref 82–98)
NASAL CANNULA: 3
O2 CT BLDA-SCNC: 12.4 ML/DL (ref 16–23)
OXYHGB MFR BLDA: 94 % (ref 94–97)
PCO2 BLDA: 35.3 MM HG (ref 36–44)
PH BLDA: 7.46 [PH] (ref 7.35–7.45)
PLATELET # BLD AUTO: 60 THOUSANDS/UL (ref 149–390)
PMV BLD AUTO: 9.7 FL (ref 8.9–12.7)
PO2 BLDA: 73.3 MM HG (ref 75–129)
POTASSIUM SERPL-SCNC: 3.5 MMOL/L (ref 3.5–5.3)
RBC # BLD AUTO: 3.08 MILLION/UL (ref 3.81–5.12)
SODIUM SERPL-SCNC: 140 MMOL/L (ref 135–147)
SPECIMEN SOURCE: ABNORMAL
WBC # BLD AUTO: 16.06 THOUSAND/UL (ref 4.31–10.16)

## 2022-11-10 PROCEDURE — B41GYZZ FLUOROSCOPY OF LEFT LOWER EXTREMITY ARTERIES USING OTHER CONTRAST: ICD-10-PCS | Performed by: RADIOLOGY

## 2022-11-10 PROCEDURE — 99233 SBSQ HOSP IP/OBS HIGH 50: CPT | Performed by: INTERNAL MEDICINE

## 2022-11-10 PROCEDURE — C1769 GUIDE WIRE: HCPCS

## 2022-11-10 PROCEDURE — 37221 PR REVSC OPN/PRQ ILIAC ART W/STNT PLMT & ANGIOPLSTY: CPT | Performed by: RADIOLOGY

## 2022-11-10 PROCEDURE — 76937 US GUIDE VASCULAR ACCESS: CPT | Performed by: RADIOLOGY

## 2022-11-10 PROCEDURE — C1876 STENT, NON-COA/NON-COV W/DEL: HCPCS

## 2022-11-10 PROCEDURE — 85027 COMPLETE CBC AUTOMATED: CPT | Performed by: INTERNAL MEDICINE

## 2022-11-10 PROCEDURE — 047C3DZ DILATION OF RIGHT COMMON ILIAC ARTERY WITH INTRALUMINAL DEVICE, PERCUTANEOUS APPROACH: ICD-10-PCS | Performed by: RADIOLOGY

## 2022-11-10 PROCEDURE — C1894 INTRO/SHEATH, NON-LASER: HCPCS

## 2022-11-10 PROCEDURE — 047D3ZZ DILATION OF LEFT COMMON ILIAC ARTERY, PERCUTANEOUS APPROACH: ICD-10-PCS | Performed by: RADIOLOGY

## 2022-11-10 PROCEDURE — 82948 REAGENT STRIP/BLOOD GLUCOSE: CPT

## 2022-11-10 PROCEDURE — 94640 AIRWAY INHALATION TREATMENT: CPT

## 2022-11-10 PROCEDURE — 37184 PRIM ART M-THRMBC 1ST VSL: CPT

## 2022-11-10 PROCEDURE — 82805 BLOOD GASES W/O2 SATURATION: CPT

## 2022-11-10 PROCEDURE — C1725 CATH, TRANSLUMIN NON-LASER: HCPCS

## 2022-11-10 PROCEDURE — 37185 PRIM ART M-THRMBC SBSQ VSL: CPT

## 2022-11-10 PROCEDURE — C1887 CATHETER, GUIDING: HCPCS

## 2022-11-10 PROCEDURE — 04CC3ZZ EXTIRPATION OF MATTER FROM RIGHT COMMON ILIAC ARTERY, PERCUTANEOUS APPROACH: ICD-10-PCS | Performed by: RADIOLOGY

## 2022-11-10 PROCEDURE — B41FYZZ FLUOROSCOPY OF RIGHT LOWER EXTREMITY ARTERIES USING OTHER CONTRAST: ICD-10-PCS | Performed by: RADIOLOGY

## 2022-11-10 PROCEDURE — 85730 THROMBOPLASTIN TIME PARTIAL: CPT | Performed by: INTERNAL MEDICINE

## 2022-11-10 PROCEDURE — X27J385 DILATION OF LEFT FEMORAL ARTERY WITH SUSTAINED RELEASE DRUG-ELUTING INTRALUMINAL DEVICE, PERCUTANEOUS APPROACH, NEW TECHNOLOGY GROUP 5: ICD-10-PCS | Performed by: RADIOLOGY

## 2022-11-10 PROCEDURE — 75630 X-RAY AORTA LEG ARTERIES: CPT

## 2022-11-10 PROCEDURE — 37247 TRLUML BALO ANGIOP ADDL ART: CPT

## 2022-11-10 PROCEDURE — 85730 THROMBOPLASTIN TIME PARTIAL: CPT | Performed by: STUDENT IN AN ORGANIZED HEALTH CARE EDUCATION/TRAINING PROGRAM

## 2022-11-10 PROCEDURE — 99232 SBSQ HOSP IP/OBS MODERATE 35: CPT | Performed by: SURGERY

## 2022-11-10 PROCEDURE — 71045 X-RAY EXAM CHEST 1 VIEW: CPT

## 2022-11-10 PROCEDURE — 37246 TRLUML BALO ANGIOP 1ST ART: CPT

## 2022-11-10 PROCEDURE — 04CL3ZZ EXTIRPATION OF MATTER FROM LEFT FEMORAL ARTERY, PERCUTANEOUS APPROACH: ICD-10-PCS | Performed by: RADIOLOGY

## 2022-11-10 PROCEDURE — 37226 PR REVSC OPN/PRQ FEM/POP W/STNT/ANGIOP SM VSL: CPT | Performed by: RADIOLOGY

## 2022-11-10 PROCEDURE — C1874 STENT, COATED/COV W/DEL SYS: HCPCS

## 2022-11-10 PROCEDURE — 3E05317 INTRODUCTION OF OTHER THROMBOLYTIC INTO PERIPHERAL ARTERY, PERCUTANEOUS APPROACH: ICD-10-PCS | Performed by: RADIOLOGY

## 2022-11-10 PROCEDURE — 37184 PRIM ART M-THRMBC 1ST VSL: CPT | Performed by: RADIOLOGY

## 2022-11-10 PROCEDURE — C1884 EMBOLIZATION PROTECT SYST: HCPCS

## 2022-11-10 PROCEDURE — 80048 BASIC METABOLIC PNL TOTAL CA: CPT | Performed by: INTERNAL MEDICINE

## 2022-11-10 PROCEDURE — 99152 MOD SED SAME PHYS/QHP 5/>YRS: CPT

## 2022-11-10 PROCEDURE — 99152 MOD SED SAME PHYS/QHP 5/>YRS: CPT | Performed by: RADIOLOGY

## 2022-11-10 PROCEDURE — 76937 US GUIDE VASCULAR ACCESS: CPT

## 2022-11-10 PROCEDURE — 99221 1ST HOSP IP/OBS SF/LOW 40: CPT | Performed by: PSYCHIATRY & NEUROLOGY

## 2022-11-10 PROCEDURE — C1760 CLOSURE DEV, VASC: HCPCS

## 2022-11-10 PROCEDURE — 047N3ZZ DILATION OF LEFT POPLITEAL ARTERY, PERCUTANEOUS APPROACH: ICD-10-PCS | Performed by: RADIOLOGY

## 2022-11-10 PROCEDURE — 36600 WITHDRAWAL OF ARTERIAL BLOOD: CPT

## 2022-11-10 PROCEDURE — 99153 MOD SED SAME PHYS/QHP EA: CPT

## 2022-11-10 RX ORDER — QUETIAPINE FUMARATE 25 MG/1
25 TABLET, FILM COATED ORAL
Status: DISCONTINUED | OUTPATIENT
Start: 2022-11-10 | End: 2023-01-12 | Stop reason: HOSPADM

## 2022-11-10 RX ORDER — SODIUM CHLORIDE FOR INHALATION 0.9 %
VIAL, NEBULIZER (ML) INHALATION
Status: COMPLETED
Start: 2022-11-10 | End: 2022-11-10

## 2022-11-10 RX ORDER — LEVALBUTEROL 1.25 MG/.5ML
1.25 SOLUTION, CONCENTRATE RESPIRATORY (INHALATION)
Status: DISCONTINUED | OUTPATIENT
Start: 2022-11-10 | End: 2022-11-11

## 2022-11-10 RX ORDER — ACETAMINOPHEN 325 MG/1
650 TABLET ORAL EVERY 8 HOURS SCHEDULED
Status: DISCONTINUED | OUTPATIENT
Start: 2022-11-10 | End: 2022-11-14

## 2022-11-10 RX ORDER — OXYCODONE HYDROCHLORIDE 10 MG/1
10 TABLET ORAL EVERY 4 HOURS PRN
Status: DISCONTINUED | OUTPATIENT
Start: 2022-11-10 | End: 2022-11-17

## 2022-11-10 RX ORDER — HYDRALAZINE HYDROCHLORIDE 20 MG/ML
INJECTION INTRAMUSCULAR; INTRAVENOUS CODE/TRAUMA/SEDATION MEDICATION
Status: COMPLETED | OUTPATIENT
Start: 2022-11-10 | End: 2022-11-10

## 2022-11-10 RX ORDER — IODIXANOL 320 MG/ML
100 INJECTION, SOLUTION INTRAVASCULAR
Status: COMPLETED | OUTPATIENT
Start: 2022-11-10 | End: 2022-11-10

## 2022-11-10 RX ORDER — HYDROMORPHONE HCL/PF 1 MG/ML
0.5 SYRINGE (ML) INJECTION
Status: DISCONTINUED | OUTPATIENT
Start: 2022-11-10 | End: 2022-11-12

## 2022-11-10 RX ORDER — DOXAZOSIN 2 MG/1
2 TABLET ORAL DAILY
Status: DISCONTINUED | OUTPATIENT
Start: 2022-11-11 | End: 2022-12-09

## 2022-11-10 RX ORDER — OXYCODONE HYDROCHLORIDE 5 MG/1
5 TABLET ORAL EVERY 4 HOURS PRN
Status: DISCONTINUED | OUTPATIENT
Start: 2022-11-10 | End: 2022-11-14

## 2022-11-10 RX ORDER — LIDOCAINE HYDROCHLORIDE 10 MG/ML
INJECTION, SOLUTION EPIDURAL; INFILTRATION; INTRACAUDAL; PERINEURAL CODE/TRAUMA/SEDATION MEDICATION
Status: COMPLETED | OUTPATIENT
Start: 2022-11-10 | End: 2022-11-10

## 2022-11-10 RX ORDER — FENTANYL CITRATE 50 UG/ML
INJECTION, SOLUTION INTRAMUSCULAR; INTRAVENOUS CODE/TRAUMA/SEDATION MEDICATION
Status: COMPLETED | OUTPATIENT
Start: 2022-11-10 | End: 2022-11-10

## 2022-11-10 RX ORDER — MIDAZOLAM HYDROCHLORIDE 2 MG/2ML
INJECTION, SOLUTION INTRAMUSCULAR; INTRAVENOUS CODE/TRAUMA/SEDATION MEDICATION
Status: COMPLETED | OUTPATIENT
Start: 2022-11-10 | End: 2022-11-10

## 2022-11-10 RX ADMIN — MIDAZOLAM 1 MG: 1 INJECTION INTRAMUSCULAR; INTRAVENOUS at 14:00

## 2022-11-10 RX ADMIN — ALBUTEROL SULFATE 2 PUFF: 90 AEROSOL, METERED RESPIRATORY (INHALATION) at 08:40

## 2022-11-10 RX ADMIN — HEPARIN SODIUM 16 UNITS/KG/HR: 10000 INJECTION, SOLUTION INTRAVENOUS at 18:49

## 2022-11-10 RX ADMIN — QUETIAPINE FUMARATE 25 MG: 25 TABLET ORAL at 21:51

## 2022-11-10 RX ADMIN — OXYCODONE 5 MG: 5 TABLET ORAL at 21:51

## 2022-11-10 RX ADMIN — INSULIN LISPRO 2 UNITS: 100 INJECTION, SOLUTION INTRAVENOUS; SUBCUTANEOUS at 17:13

## 2022-11-10 RX ADMIN — AMLODIPINE BESYLATE 10 MG: 10 TABLET ORAL at 08:01

## 2022-11-10 RX ADMIN — ISODIUM CHLORIDE 3 ML: 0.03 SOLUTION RESPIRATORY (INHALATION) at 20:15

## 2022-11-10 RX ADMIN — HYDRALAZINE HYDROCHLORIDE 10 MG: 20 INJECTION, SOLUTION INTRAMUSCULAR; INTRAVENOUS at 11:23

## 2022-11-10 RX ADMIN — ACETAMINOPHEN 650 MG: 325 TABLET, FILM COATED ORAL at 10:19

## 2022-11-10 RX ADMIN — GABAPENTIN 200 MG: 100 CAPSULE ORAL at 08:01

## 2022-11-10 RX ADMIN — CARVEDILOL 25 MG: 25 TABLET, FILM COATED ORAL at 20:29

## 2022-11-10 RX ADMIN — HYDRALAZINE HYDROCHLORIDE 10 MG: 20 INJECTION INTRAMUSCULAR; INTRAVENOUS at 15:34

## 2022-11-10 RX ADMIN — MORPHINE SULFATE 2 MG: 2 INJECTION, SOLUTION INTRAMUSCULAR; INTRAVENOUS at 04:29

## 2022-11-10 RX ADMIN — MIDAZOLAM 0.5 MG: 1 INJECTION INTRAMUSCULAR; INTRAVENOUS at 14:33

## 2022-11-10 RX ADMIN — OXYCODONE HYDROCHLORIDE 10 MG: 10 TABLET ORAL at 08:30

## 2022-11-10 RX ADMIN — IODIXANOL 60 ML: 320 INJECTION, SOLUTION INTRAVASCULAR at 15:54

## 2022-11-10 RX ADMIN — INSULIN LISPRO 2 UNITS: 100 INJECTION, SOLUTION INTRAVENOUS; SUBCUTANEOUS at 11:23

## 2022-11-10 RX ADMIN — NYSTATIN 1 APPLICATION.: 100000 POWDER TOPICAL at 08:04

## 2022-11-10 RX ADMIN — HEPARIN SODIUM 18 UNITS/KG/HR: 10000 INJECTION, SOLUTION INTRAVENOUS at 03:11

## 2022-11-10 RX ADMIN — FENTANYL CITRATE 50 MCG: 50 INJECTION INTRAMUSCULAR; INTRAVENOUS at 15:38

## 2022-11-10 RX ADMIN — MORPHINE SULFATE 4 MG: 4 INJECTION INTRAVENOUS at 08:57

## 2022-11-10 RX ADMIN — HYDRALAZINE HYDROCHLORIDE 10 MG: 20 INJECTION INTRAMUSCULAR; INTRAVENOUS at 13:27

## 2022-11-10 RX ADMIN — DOXAZOSIN 1 MG: 1 TABLET ORAL at 08:01

## 2022-11-10 RX ADMIN — PANTOPRAZOLE SODIUM 40 MG: 40 TABLET, DELAYED RELEASE ORAL at 05:06

## 2022-11-10 RX ADMIN — HYDRALAZINE HYDROCHLORIDE 10 MG: 20 INJECTION, SOLUTION INTRAMUSCULAR; INTRAVENOUS at 18:48

## 2022-11-10 RX ADMIN — FENTANYL CITRATE 50 MCG: 50 INJECTION INTRAMUSCULAR; INTRAVENOUS at 14:22

## 2022-11-10 RX ADMIN — MIDAZOLAM 1 MG: 1 INJECTION INTRAMUSCULAR; INTRAVENOUS at 15:58

## 2022-11-10 RX ADMIN — SERTRALINE HYDROCHLORIDE 100 MG: 100 TABLET ORAL at 08:01

## 2022-11-10 RX ADMIN — CLOPIDOGREL BISULFATE 75 MG: 75 TABLET ORAL at 08:01

## 2022-11-10 RX ADMIN — Medication 200 MCG: at 15:34

## 2022-11-10 RX ADMIN — ALTEPLASE 4 MG: 2.2 INJECTION, POWDER, LYOPHILIZED, FOR SOLUTION INTRAVENOUS at 15:32

## 2022-11-10 RX ADMIN — LORAZEPAM 0.5 MG: 0.5 TABLET ORAL at 21:51

## 2022-11-10 RX ADMIN — ACETAMINOPHEN 650 MG: 325 TABLET, FILM COATED ORAL at 20:28

## 2022-11-10 RX ADMIN — FENTANYL CITRATE 25 MCG: 50 INJECTION INTRAMUSCULAR; INTRAVENOUS at 14:44

## 2022-11-10 RX ADMIN — LIDOCAINE HYDROCHLORIDE 10 ML: 10 INJECTION, SOLUTION EPIDURAL; INFILTRATION; INTRACAUDAL; PERINEURAL at 13:32

## 2022-11-10 RX ADMIN — FENTANYL CITRATE 50 MCG: 50 INJECTION INTRAMUSCULAR; INTRAVENOUS at 13:30

## 2022-11-10 RX ADMIN — LEVALBUTEROL 1.25 MG: 1.25 SOLUTION, CONCENTRATE RESPIRATORY (INHALATION) at 20:15

## 2022-11-10 RX ADMIN — HEPARIN SODIUM 16 UNITS/KG/HR: 10000 INJECTION, SOLUTION INTRAVENOUS at 21:56

## 2022-11-10 RX ADMIN — INSULIN GLARGINE 25 UNITS: 100 INJECTION, SOLUTION SUBCUTANEOUS at 21:50

## 2022-11-10 RX ADMIN — MORPHINE SULFATE 4 MG: 4 INJECTION INTRAVENOUS at 01:39

## 2022-11-10 RX ADMIN — MIDAZOLAM 0.5 MG: 1 INJECTION INTRAMUSCULAR; INTRAVENOUS at 14:40

## 2022-11-10 RX ADMIN — CARVEDILOL 25 MG: 25 TABLET, FILM COATED ORAL at 08:01

## 2022-11-10 RX ADMIN — Medication 150 MCG: at 15:27

## 2022-11-10 NOTE — PROGRESS NOTES
Vanessa 49  Progress Note - [facility] 1960, 65 y o  female MRN: [id number]  Unit/Bed#: ICU 01 Encounter: [id number]  Primary Care Provider: ALEX aCrr   Date and time admitted to hospital: 10/21/2022  7:58 PM    * PAD (peripheral artery disease) [facility])  Assessment & Plan  Patient has uncontrolled diabetes, hypertension, hyperlipidemia, patient was lost to follow-up and has not seen endocrinologist for over a year  Admits that she is poorly compliant with medication  Admitted due to Worsening lower extremity wounds  · CTA abd/pelvis:   § 2   LEFT: Up to 50% stenosis of the proximal common iliac artery   The proximal and distal 3rd of the superficial femoral artery demonstrates 50-75% stenosis   Popliteal artery demonstrates greater than 75% stenosis in the P2 segment   Patent   three-vessel runoff extending into the left foot  § 3  RIGHT: Proximal common iliac artery stenosis of up to 50%   50% stenosis of the common femoral artery   Superficial femoral artery demonstrates up to 50% stenosis within the proximal several centimeters, 50-75% stenosis throughout the distal 3rd of   the vessel   Popliteal artery demonstrates scattered stenoses of 50-75%   Patent three-vessel runoff extending into the right foot  · S/p abdominal aortogram,  LLE SFA shockwave angioplasty/FATOU 10/31/22       · Patient is status post IR bilateral intervention 11/10/2022    1  Disrupted proximal margin of right common iliac artery stent with stenosis and possible stent thrombosis treated with aspiration thrombectomy, 7 mm PTA, and 8 mm Express LD balloon expandable stent placement with good result  Palpable right common femoral pulse  No residual stenosis  No embolus to the right common femoral bifurcation    2  Thrombosed distal left SFA stent treated with aspiration thrombectomy, PTA with distal protection, and additional 6 x 60 mm loop via stent placement across distal margin with good result  No residual stenosis  3  Diffuse left popliteal artery disease treated with 4 mm angioplasty with no residual stenosis  4  Patent anterior tibial artery but with abnormal flow distally which may be related lack of outflow  4 mg tPA instilled into the anterior tibial artery  No further intervention performed  5  Mynx closure right common femoral artery  Successful  Plan  As per vascular team  Will continue close neuro checks  Heparin infusion  DAPT and atorvastatin 40       Diabetic ulcer of heel associated with diabetes mellitus due to underlying condition [facility])  Assessment & Plan  Lab Results   Component Value Date    HGBA1C 9 8 (H) 10/25/2022       Recent Labs     11/09/22  2122 11/10/22  0746 11/10/22  1121 11/10/22  1710   POCGLU 122 125 155* 153*       Blood Sugar Average: Last 72 hrs:  (P) 842 [id number]     Podiatry on board with recommendations  below appreciated  · No immediate plan for podiatric surgical intervention  Dressings changed, no clinical sign of acute infection B/L  · Continue local wound care, betadine DSD  Appreciate nursing assistance with dressing changes  · Elevation on green foam wedges or pillows when non-ambulatory  · Rest of care per primary team        Acute on chronic anemia  Assessment & Plan  Recent Labs     11/08/22  0532 11/09/22  0439 11/10/22  0410   HGB 8 8* 8 6* 8 7*         Hemoglobin fluctuating with baseline around 10  S/p 1 unit transfused 11/3/2022, H/H improved and has remained stable ever since  FOBT neg, no current source of bleeding, groin without hematoma  Continue to monitor H/H       Hypertensive urgency  Assessment & Plan  · Continue other oral antihypertensive as above  · Continue hydralazine 10 mg every 6 hours p r n   For SBP greater than 180      Non healing left heel wound  Assessment & Plan  · In setting of uncontrolled diabetes and known PAD  · MRI of the foot noted for "a large plantar lateral heel ulcer, without underlying soft tissue abscess  · Received Zosyn, no further abx per ID  · Continue aspirin, Plavix and statin  IR arteriogram completed on 10/31 left  lower leg arteriogram with proximal and distal SFA shockwave balloon lithotripsy and stent placement, as well as right common iliac artery stent placement  · Repeat imaging show increased velocity with flow limiting dissection of right lower limb  · Heparin gtt  · May need amputation     CTA performed 11/09 showed occlusion of previously placed stent,   "Suspect interval occlusion of recently placed proximal right common iliac artery stent"  ·        Wound of lower extremity  Assessment & Plan  MRI LLE without evidence of osteomyelitis, discontinue abx  Follow up with podiatry for continue wound check outpatient    Type 2 diabetes mellitus with hyperglycemia, with long-term current use of insulin [facility])  Assessment & Plan  Lab Results   Component Value Date    HGBA1C 9 8 (H) 10/25/2022       Recent Labs     11/09/22  2122 11/10/22  0746 11/10/22  1121 11/10/22  1710   POCGLU 122 125 155* 153*       Blood Sugar Average: Last 72 hrs:  (P) 177 [id number]   Patient's home medication 60 units glargine at bedtime, Humalog 20 units t i d  Here patient was receiving 15 units glargine at night, sliding scale  Will increase glargine to 25 units  Currently still NPO per vascular      Benign essential hypertension  Assessment & Plan  Patient's home dose of amlodipine 10 mg, lisinopril 40 mg, metoprolol succinate 25 mg daily  Lisinopril was held due to NYDIA  Plan:  Continue amlodipine 10 mg once daily, continue Coreg 25 mg b i d  Continue hydralazine 10 mg every 6 hours p r n  For SBP greater than 180      Depression, recurrent ([facility] )  Assessment & Plan  ·  Seen by Psychiatry during this admission   Unspecified mood disorder with psychosis; rule out mood disorder with psychosis secondary to other physiological condition; rule out major depression with psychotic features  Plan Recommend increasing Zoloft to 150 mg p o  daily for depression and anxiety  Seroquel 25 mg p o  q h s  as antidepressant adjunct as well as for complaints of insomnia, anxiety and hallucinations        Acute respiratory failure with hypoxia ([facility] [address] )  Assessment & Plan  Echo on 11/07 shows 90% LVEF, no diastolic dysfunction  After the procedure patient was noted to have desaturated to 85% SpO2, and needed 5 L via face mask to saturate greater than 90%  Oxygenation improved, clinically improved  Continue to wean    Uncontrolled type 2 diabetes with neuropathy  Assessment & Plan  Lab Results   Component Value Date    HGBA1C 9 8 (H) 10/25/2022       Recent Labs     11/09/22  2122 11/10/22  0746 11/10/22  1121 11/10/22  1710   POCGLU 122 125 155* 153*       Blood Sugar Average: Last 72 hrs:  (P) 177 [id number]      ----------------------------------------------------------------------------------------  HPI/24hr events: patient is back on ICU  this evening for close monitoring and neuro checks  Post bilateral limb intervention for critical limb stenosis  Due to diffuse bilateral vascular disease  Patient appropriate for transfer out of the ICU today?: No  Disposition: Admit to Stepdown Level 1  Code Status: Level 1 - Full Code  ---------------------------------------------------------------------------------------  SUBJECTIVE  Patient is post IR bilateral intervention in both limbs for extensive PVD  Patient sleepy but easily raousable, not for long periods not for long periods  Could obey commands and oriented    Review of Systems   Constitutional: Positive for fatigue  Negative for diaphoresis and fever  HENT: Negative for facial swelling and voice change  Respiratory: Negative for chest tightness and shortness of breath  Gastrointestinal: Negative for abdominal distention, abdominal pain and constipation  Musculoskeletal: Negative for neck pain and neck stiffness  Skin: Negative for pallor  Neurological: Negative for tremors, seizures, light-headedness and headaches  Review of systems was reviewed and negative unless stated above in HPI/24-hour events   ---------------------------------------------------------------------------------------  OBJECTIVE    Vitals   Vitals:    11/10/22 1559 11/10/22 1600 11/10/22 1604 11/10/22 1848   BP: (!) 180/71   (!) 193/94   BP Location:       Pulse: 80  80    Resp:  18 18    Temp:       TempSrc:       SpO2: 95%  95%    Weight:       Height:         Temp (24hrs), Av °F (37 2 °C), Min:98 8 °F (37 1 °C), Max:99 2 °F (37 3 °C)  Current: Temperature: 98 9 °F (37 2 °C)  Arterial Line BP: 112/72  Arterial Line MAP (mmHg): 92 mmHg    Respiratory:  SpO2: SpO2: 95 %, SpO2 Activity: SpO2 Activity: At Rest, SpO2 Device: O2 Device: Nasal cannula  Nasal Cannula O2 Flow Rate (L/min): 2 L/min    Invasive/non-invasive ventilation settings   Respiratory  Report   Lab Data (Last 4 hours)    None         O2/Vent Data (Last 4 hours)    None                Physical Exam  Vitals reviewed  Constitutional:       General: She is sleeping  Appearance: She is obese  HENT:      Head: Normocephalic and atraumatic  Mouth/Throat:      Mouth: Mucous membranes are moist    Eyes:      Conjunctiva/sclera: Conjunctivae normal    Neck:      Vascular: No hepatojugular reflux or JVD  Cardiovascular:      Rate and Rhythm: Regular rhythm  Pulses:           Femoral pulses are 1+ on the right side and 1+ on the left side  Popliteal pulses are 1+ on the right side and 1+ on the left side  Dorsalis pedis pulses are 1+ on the right side and 1+ on the left side  Heart sounds: Normal heart sounds  No murmur heard  Pulmonary:      Breath sounds: No wheezing or rales  Abdominal:      General: There is no distension  Palpations: Abdomen is soft  Musculoskeletal:      Right lower le+ Edema present  Left lower le+ Edema present        Right foot: Decreased range of motion  Left foot: Decreased range of motion  Comments: Diffuse echymosis posterior thigh, going down to feet   Feet:      Right foot:      Skin integrity: Warmth present  Left foot:      Skin integrity: Ulcer, skin breakdown and warmth present  Comments:     Neurological:      Mental Status: She is oriented to person, place, and time and easily aroused               Laboratory and Diagnostics:  Results from last 7 days   Lab Units 11/10/22  0410 11/09/22  0439 11/08/22  0532 11/07/22  1520 11/07/22  0437 11/06/22  0458 11/05/22  0541 11/04/22  0503   WBC Thousand/uL 16 06* 14 83* 16 57* 13 88* 8 74 8 87 9 62 11 80*   HEMOGLOBIN g/dL 8 7* 8 6* 8 8* 8 7* 7 0* 7 8* 8 0* 8 4*   HEMATOCRIT % 28 1* 28 2* 28 7* 27 2* 22 8* 25 7* 25 8* 27 1*   PLATELETS Thousands/uL 60* 83* 105* 157 266 266 277 284   NEUTROS PCT %  --   --   --   --   --  69 72 73   BANDS PCT %  --   --   --  2  --   --   --   --    MONOS PCT %  --   --   --   --   --  8 7 8   MONO PCT %  --   --   --  3*  --   --   --   --      Results from last 7 days   Lab Units 11/10/22  0410 11/09/22  0439 11/08/22  0532 11/07/22  1520 11/07/22  0437 11/06/22  0458 11/05/22  0541   SODIUM mmol/L 140 141 139 140 137 138 138   POTASSIUM mmol/L 3 5 3 5 3 6 4 3 3 8 3 8 5 0   CHLORIDE mmol/L 103 105 104 104 100 99 100   CO2 mmol/L 28 26 24 24 28 30 31   ANION GAP mmol/L 9 10 11 12 9 9 7   BUN mg/dL 15 17 19 19 19 19 18   CREATININE mg/dL 0 97 1 02 1 20 1 21 1 40* 1 37* 1 37*   CALCIUM mg/dL 8 0* 7 8* 7 4* 7 5* 8 2* 8 6 8 5   GLUCOSE RANDOM mg/dL 130 158* 216* 207* 135 167* 139   ALT U/L  --   --   --  23  --   --   --    AST U/L  --   --   --  42  --   --   --    ALK PHOS U/L  --   --   --  209*  --   --   --    ALBUMIN g/dL  --   --   --  1 8*  --   --   --    TOTAL BILIRUBIN mg/dL  --   --   --  1 53*  --   --   --      Results from last 7 days   Lab Units 11/07/22  1520   MAGNESIUM mg/dL 1 7   PHOSPHORUS mg/dL 5 6*      Results from last 7 days   Lab Units 11/10/22  1108 11/10/22  0410 11/09/22  1016 11/09/22  0336 11/08/22  1858 11/08/22  1322 11/08/22  0532   PTT seconds 94* 112* 84* 80* 55* 66* 110*              ABG:  Results from last 7 days   Lab Units 11/07/22 1938   PH ART  7 335*   PCO2 ART mm Hg 43 2   PO2 ART mm Hg 101 1   HCO3 ART mmol/L 22 5   BASE EXC ART mmol/L -3 2   ABG SOURCE  Line, Arterial     VBG:  Results from last 7 days   Lab Units 11/07/22 1938   ABG SOURCE  Line, Arterial           Micro        EKG: none today  Imaging: I have personally reviewed pertinent reports  and I have personally reviewed pertinent films in PACS    Intake and Output  I/O       11/08 0701  11/09 0700 11/09 0701  11/10 0700 11/10 0701  11/11 0700    I V  (mL/kg) 1017 1 (8 9) 785 6 (6 8) 73 2 (0 6)    Blood       IV Piggyback  250     Total Intake(mL/kg) 1017 1 (8 9) 1035 6 (8 9) 73 2 (0 6)    Urine (mL/kg/hr) 2075 (0 8) 1750 (0 6) 2660 (2)    Stool       Total Output 2075 1750 2660    Net -1057 9 -710 4 -8941 8                 Height and Weights   Height: 5' 3 75" (161 9 cm)     Body mass index is 44 24 kg/m²  Weight (last 2 days)     Date/Time Weight    11/10/22 0515 116 (255 73)    11/09/22 0600 114 (251 77)    11/08/22 0532 114 (250 88)            Nutrition       Diet Orders   (From admission, onward)             Start     Ordered    11/10/22 0414  Diet NPO; Sips with meds  Diet effective now        References:    Nutrtion Support Algorithm Enteral vs  Parenteral   Question Answer Comment   Diet Type NPO    NPO Except: Sips with meds    RD to adjust diet per protocol?  Yes        11/10/22 0413                  Active Medications  Scheduled Meds:  Current Facility-Administered Medications   Medication Dose Route Frequency Provider Last Rate   • acetaminophen  650 mg Oral Q6H PRN Leatha Lockhart MD     • acetaminophen  650 mg Oral Q8H [address] [facility], DO     • amLODIPine  10 mg Oral Daily Leatha Lockhart MD     • atorvastatin  40 mg Oral Daily With Orville Johnson MD     • carvedilol  25 mg Oral BID With Meals Stefanie Ochoa MD     • clopidogrel  75 mg Oral Daily Stefanie Ochoa MD     • [START ON 11/11/2022] doxazosin  2 mg Oral Daily Ashley Villaseñor MD     • gabapentin  200 mg Oral BID Stefanie Ochoa MD     • heparin (porcine)  3-30 Units/kg/hr (Order-Specific) Intravenous Titrated Stefanie Ochoa MD 16 Units/kg/hr (11/10/22 1849)   • heparin (porcine)  4,400 Units Intravenous Q1H PRN Stefanie Ochoa MD     • heparin (porcine)  8,800 Units Intravenous Q1H PRN Stefanie Ochoa MD     • hydrALAZINE  10 mg Intravenous Q6H PRN Stefanie Ochoa MD     • HYDROmorphone  0 5 mg Intravenous Q1H PRN Yady Hence, DO     • insulin glargine  25 Units Subcutaneous HS Stefanie Ochoa MD     • insulin lispro  2-12 Units Subcutaneous TID AC Stefanie Ochoa MD     • levalbuterol  1 25 mg Nebulization TID Yady Hence, DO     • LORazepam  0 5 mg Oral Q8H PRN Stefanie Ochoa MD     • metoclopramide  5 mg Intravenous Q6H PRN Stefanie Ochoa MD     • naloxone  0 04 mg Intravenous Q1MIN PRN Landy Eisenmenger Jannis Busman, DO     • nystatin   Topical BID Stefanie Ochoa MD     • ondansetron  4 mg Intravenous Q6H PRN Stefanie Ochoa MD     • oxyCODONE  10 mg Oral Q4H PRN Yady Hence, DO     • oxyCODONE  5 mg Oral Q4H PRN Yady Hence, DO     • pantoprazole  40 mg Oral Early Morning Stefanie Ochoa MD     • QUEtiapine  25 mg Oral HS Yady Hence, DO     • [START ON 11/11/2022] sertraline  150 mg Oral Daily Carl Johnson DO       Continuous Infusions:  heparin (porcine), 3-30 Units/kg/hr (Order-Specific), Last Rate: 16 Units/kg/hr (11/10/22 1849)      PRN Meds:   acetaminophen, 650 mg, Q6H PRN  heparin (porcine), 4,400 Units, Q1H PRN  heparin (porcine), 8,800 Units, Q1H PRN  hydrALAZINE, 10 mg, Q6H PRN  HYDROmorphone, 0 5 mg, Q1H PRN  LORazepam, 0 5 mg, Q8H PRN  metoclopramide, 5 mg, Q6H PRN  naloxone, 0 04 mg, Q1MIN PRN  ondansetron, 4 mg, Q6H PRN  oxyCODONE, 10 mg, Q4H PRN  oxyCODONE, 5 mg, Q4H PRN        Invasive Devices Review  Invasive Devices  Report    Peripheral Intravenous Line  Duration           Peripheral IV 11/09/22 Left;Proximal;Upper;Ventral (anterior) Arm 1 day    Peripheral IV 11/10/22 Right;Ventral (anterior) Forearm <1 day          Drain  Duration           Urethral Catheter Latex 16 Fr  3 days                Rationale for remaining devices: monitoring  ---------------------------------------------------------------------------------------  Advance Directive and Living Will:      Power of :    POLST:    ---------------------------------------------------------------------------------------  Care Time Delivered: 30 min        Agustina Morris MD      Portions of the record may have been created with voice recognition software  Occasional wrong word or "sound a like" substitutions may have occurred due to the inherent limitations of voice recognition software    Read the chart carefully and recognize, using context, where substitutions have occurred

## 2022-11-10 NOTE — ASSESSMENT & PLAN NOTE
Recent Labs     11/08/22  0532 11/09/22  0439 11/10/22  0410   HGB 8 8* 8 6* 8 7*         Hemoglobin fluctuating with baseline around 10  S/p 1 unit transfused 11/3/2022, H/H improved and has remained stable ever since  FOBT neg, no current source of bleeding, groin without hematoma  Continue to monitor H/H

## 2022-11-10 NOTE — ASSESSMENT & PLAN NOTE
Patient's home dose of amlodipine 10 mg, lisinopril 40 mg, metoprolol succinate 25 mg daily  Lisinopril was held due to NYDIA  Plan:  Continue amlodipine 10 mg once daily, continue Coreg 25 mg b i d  Continue hydralazine 10 mg every 6 hours p r n   For SBP greater than 180

## 2022-11-10 NOTE — UTILIZATION REVIEW
Hello    Patient remains IH & updated clinical was submitted to you on yesterday 11/09/22  Please let me know if you did not receive it       Patient's total cost as of today 11/10/22 is $388,[phone] 59    Thank you

## 2022-11-10 NOTE — ASSESSMENT & PLAN NOTE
Echo on 11/07 shows 33% LVEF, no diastolic dysfunction  After the procedure patient was noted to have desaturated to 85% SpO2, and needed 5 L via face mask to saturate greater than 90%  Oxygenation improved, clinically improved  Continue to wean

## 2022-11-10 NOTE — ASSESSMENT & PLAN NOTE
·  Seen by Psychiatry during this admission  Unspecified mood disorder with psychosis; rule out mood disorder with psychosis secondary to other physiological condition; rule out major depression with psychotic features  Plan   Recommend increasing Zoloft to 150 mg p o  daily for depression and anxiety    Seroquel 25 mg p o  q h s  as antidepressant adjunct as well as for complaints of insomnia, anxiety and hallucinations

## 2022-11-10 NOTE — BRIEF OP NOTE (RAD/CATH)
INTERVENTIONAL RADIOLOGY PROCEDURE NOTE    Date: 11/10/2022    Procedure:   Procedure Summary     Date:  Room / Location:     Anesthesia Start:  Anesthesia Stop:     Procedure:  Diagnosis:     Scheduled Providers:  Responsible Provider:     Anesthesia Type: Not recorded ASA Status: Not recorded          Preoperative diagnosis:   1  PAD (peripheral artery disease) ([facility])    2  UTI (urinary tract infection)    3  Hypertension    4  Open wound of foot, complicated, left, initial encounter    5  Hypokalemia    6  OREILLY (dyspnea on exertion)    7  Necrotic toes ([address] )    8  Sepsis ([address] )    9  Non healing left heel wound    10  Stenosis of femoral artery ([address] )    11  Wound of right lower extremity, initial encounter    12  Depression, recurrent ([address] )         Postoperative diagnosis: Same  Surgeon: Shayna Shah MD     Assistant: None  No qualified resident was available  Blood loss:  50 mL    Specimens:  None     Findings:   1  Disrupted proximal margin of right common iliac artery stent with stenosis and possible stent thrombosis treated with aspiration thrombectomy, 7 mm PTA, and 8 mm Express LD balloon expandable stent placement with good result  Palpable right common femoral pulse  No residual stenosis  No embolus to the right common femoral bifurcation  2  Thrombosed distal left SFA stent treated with aspiration thrombectomy, PTA with distal protection, and additional 6 x 60 mm loop via stent placement across distal margin with good result  No residual stenosis  3  Diffuse left popliteal artery disease treated with 4 mm angioplasty with no residual stenosis  4  Patent anterior tibial artery but with abnormal flow distally which may be related lack of outflow  4 mg tPA instilled into the anterior tibial artery  No further intervention performed  5  Mynx closure right common femoral artery  Successful  Updated vascular and patient's daughter, Sissy Rodriguez  Complications: None immediate      Anesthesia: conscious sedation

## 2022-11-10 NOTE — PROGRESS NOTES
Progress Note - vascular Surgery  Victoria Monzon 65 y o  female MRN: [id number]  Unit/Bed#: ICU 01 Encounter: [id number]    Assessment:  65 y o  female with history of HTN, DM, HLD, b/l CLTI, presenting with LLE heel eschar and RLE gangrenous 5th toe, sepsis, and NYDIA  She is s/p LLE angio with R ALLISON PTA, LLE SFA IVL/FATOU on 10/31, and s/p RLE angio with CFA DCB PTA, arthrectomy, SFA DCB/FATOU, pop DCB  on 11/07  Patient now has symptomatic reocclusion of her right ALLISON stent with rest pain and skin mottling  Concern for arthro embolic event  Plan:  - Diet NPO; Sips with meds  - Pain and Nausea control PRN  - plan for IR RLE reintervention today  - continue frequent neurovascular checks  -continue heparin drip  - Plavix atorvastatin    Subjective/Objective     Subjective:  Patient reports slightly worsening RLE pain overnight  Expresses inability to move RLE  Objective:   Vitals: Blood pressure 161/62, pulse 82, temperature 99 2 °F (37 3 °C), temperature source Oral, resp  rate 18, height 5' 3 75" (1 619 m), weight 116 kg (255 lb 11 7 oz), SpO2 95 %, not currently breastfeeding  ,Body mass index is 44 24 kg/m²  I/O       11/08 0701  11/09 0700 11/09 0701  11/10 0700 11/10 0701  11/11 0700    I V  (mL/kg) 1017 1 (8 9) 785 6 (6 8)     Blood       IV Piggyback  250     Total Intake(mL/kg) 1017 1 (8 9) 1035 6 (8 9)     Urine (mL/kg/hr) 2075 (0 8) 1750 (0 6)     Stool       Total Output 2075 1750     Net -1054 9 -017 4                  Physical Exam:  Gen: NAD, Aox3, Comfortable in bed  Chest: Normal work of breathing, no respiratory distress  Abd: Soft, ND, NT  Ext:  RLE is edematous and tender from the knee down  Wide area of skin mottling especially concentrated on the dorsum of the foot  Only faint AT signals appreciated distally  Dry Gangrenous right 5th toe  LLE is wrapped at the heel  LLE is also pale but nontender    Neuromotor intact        Lab, Imaging and other studies:   I have personally reviewed pertinent reports    , CBC with diff:   Lab Results   Component Value Date    WBC 16 06 (H) 11/10/2022    HGB 8 7 (L) 11/10/2022    HCT 28 1 (L) 11/10/2022    MCV 91 11/10/2022    PLT 60 (L) 11/10/2022    MCH 28 2 11/10/2022    MCHC 31 0 (L) 11/10/2022    RDW 15 9 (H) 11/10/2022    MPV 9 7 11/10/2022   , BMP/CMP:   Lab Results   Component Value Date    SODIUM 140 11/10/2022    K 3 5 11/10/2022     11/10/2022    CO2 28 11/10/2022    BUN 15 11/10/2022    CREATININE 0 97 11/10/2022    CALCIUM 8 0 (L) 11/10/2022    EGFR 63 11/10/2022     VTE Pharmacologic Prophylaxis: Heparin  VTE Mechanical Prophylaxis: sequential compression device        Scott Calvillo MD  11/10/2022  7:07 AM

## 2022-11-10 NOTE — PROGRESS NOTES
[facility]  Progress Note - [address] 1960, 65 y o  female MRN: [id number]  Unit/Bed#: ICU 01 Encounter: [id number]  Primary Care Provider: ALEX Campoverde   Date and time admitted to hospital: 10/21/2022  7:58 PM    * PAD (peripheral artery disease) [facility])  Assessment & Plan  Patient has uncontrolled diabetes, hypertension, hyperlipidemia, patient was lost to follow-up and has not seen endocrinologist for over a year  Admits that she is poorly compliant with medication  Admitted due to Worsening lower extremity wounds     S/p abdominal aortogram,  LLE SFA shockwave angioplasty/FATOU 10/31/22   S/p R femoral endarterectomy 11/7  Continue heparin drip  Continue atorvastatin 40 mg once daily, clopidogrel 45 mg once daily      Acute on chronic anemia  Assessment & Plan  Hemoglobin fluctuating with baseline around 10  S/p 1 unit transfused 11/3/2022, H/H improved and has remained stable ever since  Recent Labs     11/08/22  0532 11/09/22  0439   HGB 8 8* 8 6*         Generalized edema due to fluid overload  Assessment & Plan  Daily weights      Hypertensive urgency  Assessment & Plan    · Continue Coreg, blood pressure well controlled  · Continue lisinopril 40 mg and amlodipine 10 mg daily  · Hydralazine prn for sbp greater than 180    Non healing left heel wound  Assessment & Plan  · In setting of uncontrolled diabetes and known PAD  · MRI of the foot noted for "a large plantar lateral heel ulcer, without underlying soft tissue abscess  · Received Zosyn, no further abx per ID  · Continue aspirin, Plavix and statin  IR arteriogram completed on 10/31 left  lower leg arteriogram with proximal and distal SFA shockwave balloon lithotripsy and stent placement, as well as right common iliac artery stent placement  · Repeat imaging show increased velocity with flow limiting dissection of right lower limb  · Heparin gtt  · May need amputation    CTA performed today showed occlusion of previously placed stent,   "Suspect interval occlusion of recently placed proximal right common iliac artery stent"  ·     Wound of lower extremity  Assessment & Plan  MRI LLE without evidence of osteomyelitis, discontinue abx  Follow up with podiatry for continue wound check outpatient    Type 2 diabetes mellitus with hyperglycemia, with long-term current use of insulin [facility])  Assessment & Plan  Lab Results   Component Value Date    HGBA1C 9 8 (H) 10/25/2022     Recent Labs     11/08/22  0033 11/08/22  0537 11/08/22  0658 11/08/22  1231   POCGLU 235* 202* 209* 192*     · Continue sliding scale and basal bolus protocol  · Change to lantus 25 units bedtime, sliding scale  · Continue insulin sliding scale    Benign essential hypertension  Assessment & Plan  Continue Coreg, blood pressure well controlled  · Continue lisinopril 40 mg and amlodipine 10 mg daily  · Hydralazine prn for sbp greater than 180      Depression, recurrent (HCC)  Assessment & Plan  Continue sertraline, Xanax PRN    Acute respiratory failure with hypoxia ([facility] [address] )  Assessment & Plan  Echo on 11/07 shows 19% LVEF, no diastolic dysfunction  After the procedure patient was noted to have desaturated to 85% SpO2, and needed 5 L via face mask to saturate greater than 90%  Oxygenation improved, clinically improved  Continue to wean    NYDIA (acute kidney injury) (HCC)-resolved as of 11/8/2022  Assessment & Plan  Lab Results   Component Value Date    CREATININE 1 20 11/08/2022    CREATININE 1 21 11/07/2022    CREATININE 1 40 (H) 11/07/2022      · Patient admitted on 10/22 POA1 44 bl <1 1  Nephrology was following  Creatinine  Has trended down   · Improved back to baseline   Now resolved     Plan:  · UA w/ microscopic, Urinary retention protocol, Bladder scan, Daily weights, I/O  · Discontinue fluids  · Monitor BMP daily and observe for downward trend of creatinine  · Avoid hypoperfusion of the kidneys, minimize nephrotoxins  · RAAS Blockers/Diuretics held: Continue lisinopril       UTI (urinary tract infection)-resolved as of 11/8/2022  Assessment & Plan  · E Coli UTI, sensitivities reviewed  Reports improvement in symptoms  Completed course of Zosyn     Sepsis (HCC)-resolved as of 11/8/2022  Assessment & Plan  Present on admission with Leucocytosis and tachycardia and resolved  ID input appreciated  Sepsis suspected more likely due to UTI than left heel wound which is without purulence, osteomyelitis or cellulitis   Blood cultures no growth 4 days  S/p Zosyn therapy x7 days  Stable off Abx 48 hours with leukocytosis resolved     N&V (nausea and vomiting)-resolved as of 11/8/2022  Assessment & Plan  Resolved  Continue supportive care  Monitor electrolytes         [facility] Internal Medicine Progress Note  Patient: [address] 65 y o  female   MRN: [id number]  PCP: ALEX Delatorre Si  Unit/Bed#: ICU 01 Encounter: [id number]  Date Of Visit: 11/09/22    Assessment:    Principal Problem:    PAD (peripheral artery disease) ([facility] [address] )  Active Problems:    Acute respiratory failure with hypoxia ([facility])    Depression, recurrent ([address] )    Benign essential hypertension    Type 2 diabetes mellitus with hyperglycemia, with long-term current use of insulin ([facility])    Wound of lower extremity    Non healing left heel wound    Hypertensive urgency    Generalized edema due to fluid overload    Acute on chronic anemia      Plan:    · Patient was transitioned to the Internal Medicine Service, previously Vascular was primary    · Vascular surgery is managing main issue  · Will continue to maintain normal glycemia  · All reviewed CT imaging, as well as vascular imaging which shows significant vascular disease  · Patient remains high risk for proximal limb loss  · Patient with significant psychiatric history, will place         VTE Pharmacologic Prophylaxis:   Pharmacologic: Heparin Drip  Mechanical VTE Prophylaxis in Place: Yes    Patient Centered Rounds: I have performed bedside rounds with nursing staff today  Discussions with Specialists or Other Care Team Provider:  Or     Education and Discussions with Family / Patient:  Discussed with patient daughter    Time Spent for Care: 45 minutes  More than 50% of total time spent on counseling and coordination of care as described above  Current Length of Stay: 18 day(s)    Current Patient Status: Inpatient   Certification Statement: The patient will continue to require additional inpatient hospital stay due to Peripheral artery disease, heparin drip, vascular evaluation    Discharge Plan / Estimated Discharge Date: To be discharged, will greater than 72 hours    Code Status: Level 1 - Full Code      Subjective:   Seen and examined, no acute complaints  No nausea vomiting diarrhea  No abdominal pain    Significantly dip, reports leg pain    A complete and comprehensive 14 point organ system review has been performed and all other systems are negative other than stated above  Objective:     Vitals:   Temp (24hrs), Av 5 °F (36 9 °C), Min:98 1 °F (36 7 °C), Max:99 2 °F (37 3 °C)    Temp:  [98 1 °F (36 7 °C)-99 2 °F (37 3 °C)] 99 2 °F (37 3 °C)  HR:  [70-78] 74  Resp:  [11-36] 20  BP: (116-200)/(54-88) 149/74  SpO2:  [91 %-95 %] 93 %  Body mass index is 43 56 kg/m²  Input and Output Summary (last 24 hours):        Intake/Output Summary (Last 24 hours) at 2022  Last data filed at 2022 1710  Gross per 24 hour   Intake 749 29 ml   Output 1600 ml   Net -850 71 ml       Physical Exam:     General: well appearing, no acute distress  HEENT: atraumatic, PERRLA, moist mucosa, normal pharynx, normal tonsils and adenoids, normal tongue, no fluid in sinuses  Neck: Trachea midline, no carotid bruit, no masses  Respiratory: normal chest wall expansion, CTA B, no r/r/w, no rubs  Cardiovascular: RRR, no m/r/g, Normal S1 and S2  Abdomen: Soft, non-tender, non-distended, normal bowel sounds in all quadrants, no hepatosplenomegaly, no tympany  Rectal: deferred  Musculoskeletal:  Moves all  Integumentary:  Lower extremity ulcer  Heme/Lymph: no lymphadenopathy, no bruises  Neurological: Cranial Nerves II-XII grossly intact, no tics, normal sensation to pressure and light touch  Psychiatric: cooperative with normal mood, affect, and cognition      Additional Data:     Labs:    Results from last 7 days   Lab Units 11/09/22  0439 11/08/22  0532 11/07/22  1520 11/07/22  0437 11/06/22  0458   WBC Thousand/uL 14 83*   < > 13 88*   < > 8 87   HEMOGLOBIN g/dL 8 6*   < > 8 7*   < > 7 8*   HEMATOCRIT % 28 2*   < > 27 2*   < > 25 7*   PLATELETS Thousands/uL 83*   < > 157   < > 266   NEUTROS PCT %  --   --   --   --  69   LYMPHS PCT %  --   --   --   --  14   LYMPHO PCT %  --   --  5*  --   --    MONOS PCT %  --   --   --   --  8   MONO PCT %  --   --  3*  --   --    EOS PCT %  --   --  3  --  7*    < > = values in this interval not displayed  Results from last 7 days   Lab Units 11/09/22  0439 11/08/22  0532 11/07/22  1520   POTASSIUM mmol/L 3 5   < > 4 3   CHLORIDE mmol/L 105   < > 104   CO2 mmol/L 26   < > 24   BUN mg/dL 17   < > 19   CREATININE mg/dL 1 02   < > 1 21   CALCIUM mg/dL 7 8*   < > 7 5*   ALK PHOS U/L  --   --  209*   ALT U/L  --   --  23   AST U/L  --   --  42    < > = values in this interval not displayed  * I Have Reviewed All Lab Data Listed Above  * Additional Pertinent Lab Tests Reviewed:  [facility]    Imaging:    Imaging Reports Reviewed Today Include:  No new imaging  Imaging Personally Reviewed by Myself Includes:  No new imaging    Recent Cultures (last 7 days):           Last 24 Hours Medication List:   Current Facility-Administered Medications   Medication Dose Route Frequency Provider Last Rate   • acetaminophen  650 mg Oral Q6H PRN Xi Frias MD     • albuterol  2 puff Inhalation Q4H PRN Xi Frias MD     • amLODIPine  10 mg Oral Daily Noel Gonzalez MD     • atorvastatin  40 mg Oral Daily With Radha Enriquez MD     • carvedilol  25 mg Oral BID With Meals Noel Gonzalez MD     • clopidogrel  75 mg Oral Daily Noel Gonzalez MD     • doxazosin  1 mg Oral Daily Ramakrishna Schmidt MD     • gabapentin  200 mg Oral BID Noel Gonzalez MD     • heparin (porcine)  3-30 Units/kg/hr (Order-Specific) Intravenous Titrated Noel Gonzalez MD 18 Units/kg/hr (11/09/22 1406)   • heparin (porcine)  4,400 Units Intravenous Q1H PRN Noel Gonzalez MD     • heparin (porcine)  8,800 Units Intravenous Q1H PRN Noel Gonzalez MD     • hydrALAZINE  10 mg Intravenous Q6H PRN Noel Gonzalez MD     • insulin glargine  25 Units Subcutaneous HS Noel Gonzalez MD     • insulin lispro  2-12 Units Subcutaneous TID AC Noel Gonzalez MD     • LORazepam  0 5 mg Oral Q8H PRN Noel Gonzalez MD     • metoclopramide  5 mg Intravenous Q6H PRN Noel Gonzalez MD     • morphine injection  4 mg Intravenous Q4H PRN Noel Gonzalez MD     • nystatin   Topical BID Noel Gonzalez MD     • ondansetron  4 mg Intravenous Q6H PRN Noel Gonzalez MD     • oxyCODONE  5 mg Oral Q4H PRN Noel Gonzalez MD      Or   • oxyCODONE  10 mg Oral Q4H PRN Noel Gonzalez MD     • pantoprazole  40 mg Oral Early Morning Noel Gonzalez MD     • sertraline  100 mg Oral Daily Noel Gonzalez MD     • sodium chloride  75 mL/hr Intravenous Continuous Noel Gonzalez MD 75 mL/hr (11/09/22 1603)        Today, Patient Was Seen By: Horacio Nolan DO    ** Please Note: This note was completed in part utilizing [employer] Software    Grammatical errors, random word insertions, spelling mistakes, and incomplete sentences may be an occasional consequence of this system secondary to software limitations, ambient noise, and hardware issues  If you have any questions or concerns about the content, text, or information contained within the body of this dictation, please contact the provider for clarification   **

## 2022-11-10 NOTE — QUICK NOTE
Patient foot examined  RLE is pale and edematous to the level of the thigh  Patient experiences pain from  her foot to the level of the knee  Patient is lethargic and is therefore difficult to gauge her level of sensation  Right foot is nonmobile  Right DP signals only  No appreciable PT signals  No appreciable signals on the left  Left lower extremity is pale but only minimally tender  Motor sensation intact on the left  Additionally, patient's blood pressure finally closer to normal limits    Was previously as high as SBP of 200s, now SBP of 149      Jorden Celestin MD  11/9/2022  8:52 PM

## 2022-11-10 NOTE — TELEMEDICINE
TeleConsultation - 49 [facility] 65 y o  female MRN: [id number]  Unit/Bed#: ICU 01 Encounter: [id number]        REQUIRED DOCUMENTATION:     1  This service was provided via Telemedicine  2  Provider located at [place]  3  TeleMed provider: Neoma Nissen, MD   4  Identify all parties in room with patient during tele consult:  Patient  5  Patient was then informed that this was a Telemedicine visit and that the exam was being conducted confidentially over secure lines  My office door was closed  No one else was in the room  Patient acknowledged consent and understanding of privacy and security of the Telemedicine visit, and gave us permission to have the assistant stay in the room in order to assist with the history and to conduct the exam   I informed the patient that I have reviewed their record in Epic and presented the opportunity for them to ask any questions regarding the visit today  The patient agreed to participate  Assessment/Plan     Principal Problem:    PAD (peripheral artery disease) (HCC)  Active Problems:    Acute respiratory failure with hypoxia (HCC)    Depression, recurrent ([facility])    Benign essential hypertension    Type 2 diabetes mellitus with hyperglycemia, with long-term current use of insulin ([facility])    Wound of lower extremity    Non healing left heel wound    Hypertensive urgency    Generalized edema due to fluid overload    Acute on chronic anemia    Assessment:    Unspecified mood disorder with psychosis; rule out mood disorder with psychosis secondary to other physiological condition; rule out major depression with psychotic features    Treatment Plan:    Recommend increasing Zoloft to 150 mg p o  daily for depression and anxiety  Recommend adding Seroquel 25 mg p o  q h s  as antidepressant adjunct as well as for complaints of insomnia, anxiety and hallucinations  This may be further titrated to effect if indicated as tolerated    Upon discharge the patient would benefit from outpatient psychiatric follow-up to include medication management as well as psychotherapy component that could be done via telehealth for convenience  No suicide precautions are indicated  Re-consult Psychiatry as needed      Current Medications:     Current Facility-Administered Medications   Medication Dose Route Frequency Provider Last Rate   • acetaminophen  650 mg Oral Q6H PRN Xi Frias MD     • acetaminophen  650 mg Oral Q8H [id number] [facility], DO     • amLODIPine  10 mg Oral Daily Xi Frias MD     • atorvastatin  40 mg Oral Daily With Iza Gaston MD     • carvedilol  25 mg Oral BID With Meals Xi Frias MD     • clopidogrel  75 mg Oral Daily Xi Frias MD     • doxazosin  1 mg Oral Daily Grisel Holcomb MD     • gabapentin  200 mg Oral BID Xi Frias MD     • heparin (porcine)  3-30 Units/kg/hr (Order-Specific) Intravenous Titrated Xi rFias MD 16 Units/kg/hr (11/10/22 0517)   • heparin (porcine)  4,400 Units Intravenous Q1H PRN Xi Frias MD     • heparin (porcine)  8,800 Units Intravenous Q1H PRN Xi Frias MD     • hydrALAZINE  10 mg Intravenous Q6H PRN Xi Frias MD     • HYDROmorphone  0 5 mg Intravenous Q1H PRN Jennifer Ybarra DO     • insulin glargine  25 Units Subcutaneous HS Xi Frias MD     • insulin lispro  2-12 Units Subcutaneous TID [facility] Xi Frias MD     • levalbuterol  1 25 mg Nebulization TID Jennifer Ybarra DO     • LORazepam  0 5 mg Oral Q8H PRN Xi Frias MD     • metoclopramide  5 mg Intravenous Q6H PRN Xi Frias MD     • naloxone  0 04 mg Intravenous Q1MIN PRN Jennifer Ybarra DO     • nystatin   Topical BID Xi Frias MD     • ondansetron  4 mg Intravenous Q6H PRN Xi Frias MD     • oxyCODONE  10 mg Oral Q4H PRN Estefanía Sofia, DO     • oxyCODONE  5 mg Oral Q4H PRN Claudie Merlin, DO     • pantoprazole  40 mg Oral Early Morning Ryan Mcarthur MD     • sertraline  100 mg Oral Daily Ryan Mcarthur MD         Risks / Benefits of Treatment:    Risks, benefits, and possible side effects of medications explained to patient and patient verbalizes understanding  Inpatient consult to Psychiatry  Consult performed by: Angeles Rios MD  Consult ordered by: Claudie Merlin, DO        Physician Requesting Consult: Claudie Merlin, DO  Principal Problem:PAD (peripheral artery disease) [facility])    Reason for Consult:  Depression      History of Present Illness      Patient is a 65 y o  female who presents to the emergency department with provider documented the following: “  Referred to ed by pcp for wound on left heel  Pt reports vomiting  Denies fever  Hx of diabetes  Not checking blood glucose at home  Not taking medications as prescribed        Pt presenting to the ED with concern for b/l foot wounds, L worse than R- sent by her PCP for further evaluation  States she had a small area of skin disruption on the L heel about 8 months ago, was seen by podiatry who initially diagnosed it as a fissure  States she was caring for it initially but then it started to get better so she stopped  Reports over the last few months the wound over th L heal has continued to worsen and become increasingly painful, now necrotic with 2 smaller necrotic lesions over the medial aspect of the L shin  Also reports a black R 5th digit  Reports she does have T2DM on insulin however has not been checking her glucose regularly and has only been using her at home insulin intermittently 2/2 to financial issues  Reports she has had dyspnea with exertion over the last few months which she attributes to being sedentary 2/2 to having a hard time getting around due to the b/l foot pain   Denies associated CP, palpitations, diaphoresis, SOB at rest, pleuritic CP and lightheadedness  Reports her LLE is intermittently swollen but seems to improve with elevation  Denies fever and chills  Reports she did have nausea and vomiting last week but no N/V over the last few days  (+) Dysuria and increased urinary frequency over the last few weeks with azo taken without significant relief  Reports it feels like previous UTIs  No hematuria, malodorous urine, vaginal complaints, flank pain, or back pain  Denies cough, congestion, abdominal pain, N/V/D, HA, weakness or any other complaint  Has been eating and drinking normaly  No other complaints today  No history of MRSA infection and no recent abx usage  Denies alcohole and illicit drug use                Prior to Admission Medications   Prescriptions Last Dose Informant Patient Reported? Taking?    ALPRAZolam (XANAX) 0 25 mg tablet     No Yes   Sig: Take 1 tablet (0 25 mg total) by mouth daily as needed for anxiety (panic attacks)   Cholecalciferol (Vitamin D-3) 25 MCG (1000 UT) CAPS Not Taking at Unknown time   Yes No   Sig: Take 2,000 Units by mouth daily   Patient not taking: Reported on 10/21/2022   Continuous Blood Gluc  (FreeStyle Noé 14 Day [place]) LITO     No No   Sig: Use 1 Units continuous   Patient not taking: Reported on 10/21/2022   Continuous Blood Gluc Sensor (FreeStyle Noé 14 Day Sensor) MISC Not Taking at Unknown time   No No   Sig: Use daily as directed for CGM - Change every 14 days   Patient not taking: No sig reported   Doxylamine Succinate, Sleep, (UNISOM PO) Not Taking at Unknown time   Yes No   Sig: Take by mouth   Patient not taking: No sig reported   Insulin Pen Needle (B-D UF III MINI PEN NEEDLES) 31G X 5 MM MISC     No No   Sig: USE WITH INSULIN FOUR TIMES DAILY   Lancets 28G MISC Not Taking at Unknown time   No No   Sig: Use 1 each 4 (four) times a day   Patient not taking: No sig reported   Multiple Vitamin (multivitamin) tablet     Yes Yes   Sig: Take 1 tablet by mouth daily   Trulicity 3  MG/0 5ML SOPN     No Yes   Sig: INJECT 0 5 ML (0 75 MG TOTAL) UNDER THE SKIN ONCE A WEEK TUESDAY   amLODIPine (NORVASC) 10 mg tablet     No Yes   Sig: TAKE 1 TABLET BY MOUTH EVERY DAY   aspirin 81 mg chewable tablet   Self Yes Yes   Sig: Chew 81 mg   atorvastatin (LIPITOR) 40 mg tablet     No Yes   Sig: TAKE 1 TABLET BY MOUTH EVERY DAY   clotrimazole-betamethasone (LOTRISONE) 1-0 05 % cream     No Yes   Sig: Apply topically 2 (two) times a day   fluocinonide (LIDEX) 0 05 % ointment     No Yes   Sig: Apply topically 2 (two) times a day   gabapentin (NEURONTIN) 100 mg capsule     No Yes   Sig: TAKE 2 CAPSULES BY MOUTH TWICE A DAY   glucose blood (True Metrix Blood Glucose Test) test strip Not Taking at Unknown time   No No   Sig: Use 1 each 4 (four) times a day   Patient not taking: No sig reported   insulin aspart (NovoLOG FlexPen) 100 UNIT/ML injection pen     No Yes   Sig: Inject 20 Units under the skin 3 (three) times a day with meals   insulin glargine (Lantus SoloStar) 100 units/mL injection pen     No Yes   Sig: Inject 60 Units under the skin daily at bedtime   lisinopril (ZESTRIL) 40 mg tablet     No Yes   Sig: TAKE 1 TABLET BY MOUTH EVERY DAY   metoprolol succinate (TOPROL-XL) 25 mg 24 hr tablet     No Yes   Sig: TAKE 1 TABLET (25 MG TOTAL) BY MOUTH DAILY     pantoprazole (PROTONIX) 40 mg tablet     No No   Sig: Take 1 tablet (40 mg total) by mouth daily for 14 days   potassium chloride (K-DUR,KLOR-CON) 10 mEq tablet     No No   Sig: Take 2 tablets (20 mEq total) by mouth 2 (two) times a day for 2 doses   sertraline (ZOLOFT) 100 mg tablet     No Yes   Sig: TAKE 1 TABLET BY MOUTH EVERY DAY      Facility-Administered Medications: None         Medical History[]Expand by Default        Past Medical History:   Diagnosis Date   • Anxiety     • Depression     • [facility] )     • Diabetes mellitus ([facility] )     • Esophageal reflux       28 APR 2015 RESOLVED   • Hyperlipidemia     • Hypertension     • Low back pain     sciatica   • Pneumonia    • Stroke [facility]) 2015     small vessel, L frontal corona radiata  Rx asa 81, Lipitor 40, risk modification   • Vitamin D deficiency              Surgical History[]Expand by Default         Past Surgical History:   Procedure Laterality Date   • COLONOSCOPY                Family History[]Expand by Default         Family History   Problem Relation Age of Onset   • Diabetes Mother     • Lung cancer Mother     • Cancer Father     • Lung cancer Father     • Hypertension Brother     • Hypertension Family           I have reviewed and agree with the history as documented            E-Cigarette/Vaping   • E-Cigarette Use Never User              E-Cigarette/Vaping Substances   • Nicotine No     • THC No     • CBD No     • Flavoring No     • Other No     • Unknown No        Social History            Tobacco Use   • Smoking status: Former Smoker       Types: Cigarettes       Quit date:        Years since quittin 8   • Smokeless tobacco: Never Used   Vaping Use   • Vaping Use: Never used   Substance Use Topics   • Alcohol use: No       Comment: OCCASIONAL ALCOHOL USE IN ALL SCRIPTS   • Drug use: No         Review of Systems   Constitutional: Negative for chills and fever  HENT: Negative for ear pain and sore throat  Eyes: Negative for pain and visual disturbance  Respiratory: Negative for cough and shortness of breath  OREILLY   Cardiovascular: Negative for chest pain and palpitations  Gastrointestinal: Negative for abdominal pain, diarrhea, nausea and vomiting  Genitourinary: Positive for dysuria and frequency  Negative for difficulty urinating, hematuria, urgency, vaginal bleeding, vaginal discharge and vaginal pain  Musculoskeletal: Negative for arthralgias, back pain and neck pain  Necrotic would over L heel, L shin and R 5th digit  Skin: Positive for wound  Negative for color change and rash     Neurological: Negative for seizures, syncope, weakness, light-headedness and headaches  All other systems reviewed and are negative  The patient served is poor historian to her profound psychomotor retardation and general malaise frequently not responding to questions  She reports that she has had depression anxiety for months now  Nursing reports that the patient has a supportive daughter but otherwise lives alone  I am not able to obtain additional psychosocial information from the patient this time  Mental status examination: The patient is alert and well oriented in all spheres  Affect is depressed  She is extremely psychomotor retarded  Speech is very slow in brief in responses  Frequently she did not respond to questions  She complained of being in severe pain with depression and anxiety stated her depression with anxiety has been going on many months  Sensorium is clear  Thought process is logical linear  Thought content is reality based  Associations are tight  In all spheres it is difficult to fully assess memory due to the brevity of the patient's responses  She denies suicidal homicidal ideation  When asked about hallucinations she admits to visual hallucinations that she finds distressful stand a occur both when she is wide awake as well as when she is trying to sleep  She states for example that she will see her cat at times  Insight and judgment are intact  She is motivated for psychiatric treatment of her depression and anxiety  Past Medical History:   Diagnosis Date   • Anxiety    • Depression    • [facility]    • Diabetes mellitus ([facility] )    • Esophageal reflux     28 APR 2015 RESOLVED   • Hyperlipidemia    • Hypertension    • Low back pain     sciatica   • Pneumonia 2019   • Stroke ([facility] ) 12/2015    small vessel, L frontal corona radiata   Rx asa 81, Lipitor 40, risk modification   • Vitamin D deficiency        Medical Review Of Systems:    Review of Systems    Meds/Allergies     all current active meds have been reviewed  No Known Allergies    Objective     Vital signs in last 24 hours:  Temp:  [98 1 °F (36 7 °C)-99 2 °F (37 3 °C)] 99 2 °F (37 3 °C)  HR:  [70-84] 80  Resp:  [11-26] 21  BP: (100-200)/(61-88) 141/79      Intake/Output Summary (Last 24 hours) at 11/10/2022 0957  Last data filed at 11/10/2022 0800  Gross per 24 hour   Intake 1049 42 ml   Output 1970 ml   Net -920 58 ml           Lab Results: I have personally reviewed all pertinent laboratory/tests results  Imaging Studies: XR chest portable    Result Date: 10/31/2022  Narrative: CHEST INDICATION:   new hypoxia, concern for CHF  COMPARISON:  Chest radiograph October 26, 2022 EXAM PERFORMED/VIEWS:  XR CHEST PORTABLE FINDINGS: Heart shadow is enlarged but unchanged from prior exam  Diffuse bilateral reticular opacities have increased since prior study  No definite pleural effusion  No pneumothorax  Osseous structures appear within normal limits for patient age  Impression: Pulmonary edema has increased since October 26, 2022  Workstation performed: [id number]     XR chest portable    Result Date: 10/27/2022  Narrative: CHEST INDICATION:   dyspnea  COMPARISON:  Chest radiograph dated 10/21/2022  EXAM PERFORMED/VIEWS:  XR CHEST PORTABLE FINDINGS: Cardiomediastinal silhouette appears unremarkable  Mild pulmonary vascular congestion  No pneumothorax or pleural effusion  Osseous structures appear within normal limits for patient age  Impression: Mild pulmonary vascular congestion  Workstation performed: [id number]     XR chest 1 view portable    Result Date: 10/22/2022  Narrative: CHEST INDICATION:   OREILLY  COMPARISON:  02/06/2020 EXAM PERFORMED/VIEWS:  XR CHEST PORTABLE 1 image FINDINGS: Cardiomediastinal silhouette appears unremarkable  The lungs are clear  No pneumothorax or pleural effusion  Osseous structures appear within normal limits for patient age  Impression: No acute cardiopulmonary disease   Workstation performed: [id number]     XR foot 3+ views RIGHT    Result Date: 10/22/2022  Narrative: RIGHT FOOT INDICATION:   necrotic R 5th digit  COMPARISON:  None VIEWS:  XR FOOT 3+ VW RIGHT Images: 3 FINDINGS: There is no acute fracture or dislocation  No focal areas of bony destruction  An inferior calcaneal spur is present  Spurring in the midfoot  No lytic or blastic osseous lesion  There are atherosclerotic calcifications  Soft tissue swelling about the 5th toe  Soft tissues are otherwise unremarkable  Impression: No acute osseous abnormality  Workstation performed: [id number]     CTA ABDOMEN W RUN OFF W WO CONTRAST    Result Date: 11/9/2022  Narrative: CT ANGIOGRAM OF THE AORTA AND LOWER EXTREMITIES WITH IV CONTRAST INDICATION:  Chronic limb ischemia with tissue loss in both legs  Previous endovascular interventions  COMPARISON: To CTA studies and 2 arteriograms since October 27, 2022 TECHNIQUE:  CT angiogram examination of the abdomen, pelvis, and lower extremities was performed according to standard protocol with intravenous contrast   This examination, like all CT scans performed in the [facility], was performed utilizing techniques to minimize radiation dose exposure, including the use of iterative reconstruction and automated exposure control  3D reconstructions were performed an independent workstation, and are supplied for review  Rad dose 3041 mGy-cm IV Contrast:  145 mL of iohexol (OMNIPAQUE)  FINDINGS: VASCULAR STRUCTURES:   Proximal right common iliac artery stent does not look opacified on CTA  No other significant common or external iliac lesion on the right side  Right common femoral arteries mildly calcified but without focal stenosis  Common femoral bifurcation filling defect is no longer present  No clear evidence of residual dissection  Right SFA is diffusely small with multiple mild stenoses proximally    Long stent within mid and distal SFA seems patent but unable to determine any residual stenosis due to stent artifact and very small luminal caliber  Popliteal artery is patent with multiple mild stenoses throughout and diffusely small caliber  No focal flow-limiting stenosis is identified  Anterior tibial and posterior tibial arteries are intact to the ankle  Posterior tibial artery occludes proximal to the calcaneus  Dorsalis pedis artery is severely stenotic distally and possibly segmentally occluded  Peroneal artery is patent but communicating arteries are not opacified  In the left leg, there is a calcified stenosis at the origin the left common iliac difficult to quantify  It is not well seen on prior arteriograms  Based on reconstructions the lesion measures 30-40% in severity  No other common or external iliac lesion  No significant left common femoral stenosis  Left SFA is diffusely small and diseased  Recent proximal and distal stenting  Proximal left SFA stent appears patent  Distal stent is occluded  Popliteal artery is severely stenotic distal to the stent  Below-knee popliteal artery is diffusely stenotic with severe stenosis at origin of the anterior tibial artery  Anterior tibial artery is patent  Posterior tibial artery is only segmentally seen proximally and occluded distally  Peroneal artery is patent  Dorsalis pedis arteries patent  OTHER FINDINGS ABDOMEN LOWER CHEST:  Small bilateral pleural effusions  LIVER/BILIARY TREE:  Unremarkable  GALLBLADDER:  No calcified gallstones  No pericholecystic inflammatory change  SPLEEN:  Asymmetric hyperperfusion into the upper pole of uncertain significance  Not seen previously  Maybe related to phase of contrast   Splenic artery is patent  No suggestion of embolic occlusion  PANCREAS:  Unremarkable  ADRENAL GLANDS: Unremarkable  KIDNEYS/URETERS:  No solid renal mass  No hydronephrosis  No urinary tract calculi  PELVIS REPRODUCTIVE ORGANS:  Unremarkable for patient's age  URINARY BLADDER:  Jacinto in place  Nondistended bladder  ADDITIONAL ABDOMINAL AND PELVIC STRUCTURES STOMACH AND BOWEL:  Unremarkable  ABDOMINOPELVIC CAVITY:   New small volume ascites ABDOMINAL WALL/INGUINAL REGIONS:  Diffuse subcutaneous edema OSSEOUS STRUCTURES:  No acute fracture or destructive osseous lesion     Diffuse subcutaneous edema in both legs  Impression: 1  Suspect interval occlusion of recently placed proximal right common iliac artery stent 2  No residual filling defect or dissection involving right common femoral artery  Diffusely small diseased right SFA and popliteal artery without focal flow-limiting stenosis identified  Patent recently placed SFA stent  3   Intact anterior tibial artery with stenotic and/or occluded dorsalis pedis artery  Posterior tibial artery occludes at the ankle  Patent peroneal artery without opacification of anterior and posterior communicating arteries  4   Near circumferentially calcified stenosis of proximal left common iliac artery  The lesion measures 30-40% in severity by reconstructions which are compromised by artifact and software limitations and therefore difficult to exclude a focal flow-limiting stenosis within this segment  5   No significant left common femoral stenosis  SFA is diffusely diseased  Newly placed proximal stent is patent but distal SFA stent is occluded  Popliteal artery is diffusely stenotic  6   Left popliteal artery is diffusely stenotic which seems new from the prior arteriogram and may be related to acute stent occlusion  Left anterior tibial artery is intact  Posterior tibial artery occludes at the calcaneus  7   Small bilateral pleural effusions 8    Small volume ascites new from prior CT with diffuse subcutaneous edema Workstation performed: [id number]     CTA ABDOMEN W RUN OFF W WO CONTRAST    Result Date: 11/2/2022  Narrative: CT ANGIOGRAM OF THE AORTA AND LOWER EXTREMITIES WITH IV CONTRAST INDICATION:  Recent left lower extremity intervention with new decrease in ankle-brachial indices in the right lower extremity  COMPARISON: CTA, dated 10/27/2022  Fluoroscopy, dated 10/31/2022  TECHNIQUE:  CT angiogram examination of the abdomen, pelvis, and lower extremities was performed according to standard protocol with intravenous contrast   This examination, like all CT scans performed in the [facility], was performed utilizing techniques to minimize radiation dose exposure, including the use of iterative reconstruction and automated exposure control  Rad dose 2576 mGy-cm IV Contrast:  130 mL of iohexol (OMNIPAQUE)  FINDINGS: VASCULAR STRUCTURES:   ABDOMINAL VASCULAR STRUCTURES:   AORTA: The abdominal aorta is normal in caliber  There are moderate atherosclerotic calcifications  CELIAC ARTERY: The origin is normal in caliber  Branching anatomy is conventional   There is no atherosclerotic disease  SUPERIOR MESENTERIC ARTERY: Normal caliber, without atherosclerotic calcifications  INFERIOR MESENTERIC ARTERY: The ostia and visualized branches are normal  RIGHT RENAL ARTERY: The single renal artery is normal in caliber  LEFT RENAL ARTERY: The single renal artery has mild atherosclerotic calcifications at the origin  LEFT LOWER EXTREMITY: LEFT COMMON ILIAC ARTERY: Mild calcific atherosclerotic disease  The vessel is normal in caliber  LEFT INTERNAL ILIAC ARTERY: Mild atherosclerotic disease  Visualized branches are normal in caliber  LEFT EXTERNAL ILIAC ARTERY: Normal in caliber, without atherosclerotic disease  LEFT COMMON FEMORAL ARTERY: Mild calcific atherosclerotic disease  The vessel is normal in caliber  LEFT PROFUNDA FEMORIS: Mild calcific atherosclerotic disease  The vessel is normal in caliber  LEFT SUPERFICIAL FEMORAL ARTERY: Interval stenting and lithotripsy of the proximal and distal left superficial femoral artery  Peak hardening artifact limits evaluation of the intraluminal stent    Remainder of the superficial femoral artery is normal  LEFT POPLITEAL ARTERY: Moderate calcific atherosclerotic disease  The vessel is normal in caliber  LEFT TIBIOPERONEAL TRUNK: Mild calcific atherosclerotic disease  The vessel is normal in caliber  LEFT ANTERIOR TIBIAL ARTERY: Mild calcific atherosclerotic disease  The vessel is normal in caliber  LEFT PERONEAL ARTERY: Mild calcific atherosclerotic disease  The vessel is normal in caliber  LEFT POSTERIOR TIBIAL ARTERY: Mild calcific atherosclerotic disease  The vessel is normal in caliber  LEFT FOOT: Visualized branches are normal  RIGHT LOWER EXTREMITY: RIGHT COMMON ILIAC ARTERY: Interval stenting of the right common iliac artery  Beam hardening artifact limits evaluation of the intraluminal stent  RIGHT INTERNAL ILIAC ARTERY: Mild atherosclerotic disease  Visualized branches are normal in caliber  RIGHT EXTERNAL ILIAC ARTERY: Normal in caliber, without atherosclerotic disease  RIGHT COMMON FEMORAL ARTERY: Interval development of a probable flow-limiting dissection flap at the prior arteriotomy site  Moderate calcific atherosclerotic disease  The vessel is normal in caliber  Atherosclerotic disease  RIGHT PROFUNDA FEMORIS: Normal in caliber, without atherosclerotic disease  RIGHT SUPERFICIAL FEMORAL ARTERY: Focal severe atherosclerotic narrowing at the adductor canal   Otherwise moderate atherosclerotic disease throughout the remainder of the vessel  RIGHT POPLITEAL ARTERY: Moderate calcific atherosclerotic disease  The vessel is normal in caliber  RIGHT TIBIOPERONEAL TRUNK: Mild calcific atherosclerotic disease  The vessel is normal in caliber  RIGHT ANTERIOR TIBIAL ARTERY: Mild calcific atherosclerotic disease  The vessel is normal in caliber  RIGHT PERONEAL ARTERY: Mild calcific atherosclerotic disease  The vessel is normal in caliber  RIGHT POSTERIOR TIBIAL ARTERY: Mild calcific atherosclerotic disease  The vessel is normal in caliber   RIGHT FOOT: Visualized branches are normal  VENOUS: The iliocaval system is normal in caliber, without collateralization  OTHER FINDINGS ABDOMEN LIVER/BILIARY TREE:  Unremarkable  GALLBLADDER:  No calcified gallstones  No pericholecystic inflammatory change  SPLEEN:  Unremarkable  Normal size  PANCREAS:  Unremarkable  ADRENAL GLANDS: Punctate calcification in the lateral christine of the left adrenal gland, benign  Right adrenal gland is normal  KIDNEYS/URETERS:  No solid renal mass  No hydronephrosis  No urinary tract calculi  PELVIS REPRODUCTIVE ORGANS:  Unremarkable for patient's age  URINARY BLADDER:  Small amount of air in the urinary bladder likely reflects prior instrumentation  ADDITIONAL ABDOMINAL AND PELVIC STRUCTURES STOMACH AND BOWEL:  Large bowel is normal   Small bowel is normal   Stomach is normal  ABDOMINOPELVIC CAVITY:   No pathologically enlarged mesenteric or retroperitoneal lymph nodes  No ascites or free intraperitoneal air  ABDOMINAL WALL/INGUINAL REGIONS:  Diffuse anasarca  OSSEOUS STRUCTURES:  No acute fracture or destructive osseous lesion  Impression: Vascular: Left lower extremity: Interval stenting and lithotripsy of the left superficial femoral artery  Right lower extremity: 1  Interval development of a probable flow-limiting dissection flap at the prior arteriotomy site in the right common femoral artery superimposed upon moderate to severe atherosclerotic disease  2   Focal severe atherosclerotic narrowing at the adductor canal with additional moderate atherosclerotic narrowing throughout the remainder of the right superficial femoral artery  The study was marked in EPIC for significant notification  Workstation performed: [id number]     CTA ABDOMEN W RUN OFF W WO CONTRAST    Result Date: 10/28/2022  Narrative: CT ANGIOGRAM OF THE AORTA AND LOWER EXTREMITIES WITH IV CONTRAST INDICATION:  Aortoiliac disease  Nonhealing left heel wound  COMPARISON: CT of the left lower extremity with contrast on 10/21/2022   CT of the abdomen pelvis with contrast on 2/20/2022  TECHNIQUE:  CT angiogram examination of the abdomen, pelvis, and lower extremities was performed according to standard protocol with intravenous contrast   This examination, like all CT scans performed in the [facility], was performed utilizing techniques to minimize radiation dose exposure, including the use of iterative reconstruction and automated exposure control  3D reconstructions were performed an independent workstation, and are supplied for review  Rad dose 2492 mGy-cm IV Contrast:  130 mL of iohexol (OMNIPAQUE)  FINDINGS: VASCULAR STRUCTURES:   Visualized thoracoabdominal aorta is patent  The infrarenal abdominal demonstrates mild stenosis, see series 2 image 48 where atherosclerotic disease encroaches upon the lumen, in this region the aortic diameter measures 8 mm  The celiac artery, SMA, AL and bilateral renal arteries are patent  Accessory renal arteries are present bilaterally  RIGHT: Common iliac artery: 50% stenosis proximally Internal iliac artery and branches: Patent  External iliac artery: Patent  Common femoral artery: 50% stenosis  Profunda femoral artery and branches: Patent  Superficial femoral artery: 5075% stenosis of the proximal several centimeters  The mid to distal SFA demonstrates segmental regions of up to 75% stenosis  Popliteal artery: Scattered stenoses of up to 50-75%  Patent three-vessel runoff extending into the right foot  LEFT: Common iliac artery: 50% stenosis proximally  Internal iliac artery and branches: Patent  External iliac artery: Patent  Profunda femoral artery and branches: Patent  Common femoral artery: No significant stenosis  Superficial femoral artery: The proximal 3rd of the vessel demonstrates up to 75% stenosis  The distal SFA demonstrates up to 75% stenosis as well  Popliteal artery: P2 segment greater than 75% stenosis  Patent three-vessel runoff extending into the left foot   OTHER FINDINGS ABDOMEN LOWER CHEST:  Not visualized  LIVER/BILIARY TREE: Partially visualized  Unremarkable  GALLBLADDER:  No calcified gallstones  No pericholecystic inflammatory change  SPLEEN:  Partially visualized  Unremarkable  Normal size  PANCREAS:  Unremarkable  ADRENAL GLANDS: Unchanged small left lateral limb calcification, unchanged  Right adrenal gland unremarkable  KIDNEYS/URETERS:  No solid renal mass  No hydronephrosis  No urinary tract calculi  Right lateral lower pole cyst  PELVIS REPRODUCTIVE ORGANS:  Unremarkable for patient's age  IUD  URINARY BLADDER:  Antidependent air, correlate for recent instrumentation ADDITIONAL ABDOMINAL AND PELVIC STRUCTURES STOMACH AND BOWEL:  Unremarkable  ABDOMINOPELVIC CAVITY:   No pathologically enlarged mesenteric or retroperitoneal lymph nodes  No ascites or free intraperitoneal air  ABDOMINAL WALL/INGUINAL REGIONS:  Mild anasarca  OSSEOUS STRUCTURES:  No acute fracture or destructive osseous lesion  Impression: 1  Moderate stenosis of the mid infrarenal abdominal aorta secondary to calcific atherosclerotic disease  2  LEFT: Up to 50% stenosis of the proximal common iliac artery  The proximal and distal 3rd of the superficial femoral artery demonstrates 50-75% stenosis  Popliteal artery demonstrates greater than 75% stenosis in the P2 segment  Patent three-vessel runoff extending into the left foot  3  RIGHT: Proximal common iliac artery stenosis of up to 50%  50% stenosis of the common femoral artery  Superficial femoral artery demonstrates up to 50% stenosis within the proximal several centimeters, 50-75% stenosis throughout the distal 3rd of the vessel  Popliteal artery demonstrates scattered stenoses of 50-75%  Patent three-vessel runoff extending into the right foot  Workstation performed: [id number]     MRI ankle/heel left  wo contrast    Result Date: 10/27/2022  Narrative: MRI ANKLE/HEEL LEFT WO CONTRAST INDICATION:   left heel eschar, unknown depth, rule out osteomyelitis  COMPARISON:  Left foot and ankle CT from 10/21/2022  TECHNIQUE:   MR sequences were obtained of the ankle including: Localizers, coronal STIR, coronal T1, axial T2 fat sat, axial PD, sagittal T2 fat sat, sagittal T1 sequences were obtained  Gadolinium was not used  FINDINGS: Exam moderately limited due to patient motion  Subcutaneous Tissues: There is a large plantar lateral heel ulcer, without underlying soft tissue abscess (series 5 image 5-15 ) Joint Effusion: None  TENDONS: Achilles tendon: Normal  Peroneus brevis and longus: Normal  Posterior tibialis, flexor digitorum longus, flexor hallucis longus: Normal  Anterior tibialis, extensor digitorum longus, extensor hallucis longus:  Normal  LIGAMENTS: Lateral collateral ligament complex:  Normal  Distal tibiofibular syndesmosis:  Normal  Medial collateral ligament complex:  Normal  Plantar Fascia:  Normal  Articular Surfaces:  Normal  Sinus Tarsi:  Normal  Tarsal Tunnel: Unremarkable  Bones:  Associated with the plantar lateral heel ulcer, there is only very minimal reactive marrow edema in the calcaneal tubercle evident on T2-weighted sequences (series 3 image 2, series 7 image 8 ) No confluent T1-weighted marrow abnormality or cortical destruction to suggest osteomyelitis  Musculature:  Normal      Impression: Exam moderately limited due to patient motion  There is a large plantar lateral heel ulcer, without underlying soft tissue abscess (series 5 image 5-15 ) Associated with this ulcer, there is only very minimal reactive marrow edema in the calcaneal tubercle evident on T2-weighted sequences (series 3 image 2, series 7 image 8 ) No confluent T1-weighted marrow abnormality or cortical destruction to suggest osteomyelitis  Workstation performed: [id number]     VAS lower limb arterial duplex, complete bilateral    Result Date: 11/9/2022  Narrative:  [facility] REPORT [facility]INICAL: Indications:  Evaluation s/p intervention   Bilateral lower extremity ulcerations  Operative History: 2022-11-07 Right CFA arterectomy w/  PTA 2022-11-07 Right Popliteal PTA and Posterior tibial PTA 2022-11-07 Right SFA PTA/ stent placement 2022-10-31 Abdominal aortogram 2022-10-31 Right ALLISON Stent 2022-10-31 Right PTA stent 2022-10-31 Left SFA shockwave angioplasty Risk Factors The patient has history of Obesity, HTN, Diabetes, CVA, Hyperlipidemia and previous smoking  Clinical Right Pressure:  188/ mm Hg, Left Pressure: IV  FINDINGS:  Segment                Right            Left                                          PSV (cm/s)  EDV  Impression  PSV (cm/s)  EDV  Common Femoral Artery          81   17                     145   20  Prox Profunda                 174   16                     230   12  Prox SFA                      143   14                      89    8  Mid SFA                        90    9  Occluded             0    0  Dist SFA                       70    8  Occluded             0    0  Proximal Pop                   41   11                      27   11  Distal Pop                     50    5                      20    7  Tibioperoneal                  40                           39       Dist Post Tibial                8    0                       0    0  Dist  Ant  Tibial              21    0                       8    3     CONCLUSION: Impression: RIGHT LOWER LIMB: Monophasic waveforms in the common femoral artery suggesting more proximal disease with diffuse disease throughout the remaining portions of the femoral-popliteal arteries without significant focal stenosis  Findings suggests tibioperoneal disease  Ankle/Brachial index: unobtainable secondary to indistinguishable Doppler signal, previous study 0 51  PVR tracing at the ankle is dampened  The PVR tracing at the metatarsal level and PPG waveform of the great toe are flat lined, suggesting poor distal perfusion, therefore, pressures are unobtainable    LEFT LOWER LIMB: An occlusion of the mid superficial femoral artery with minimal distal reconstitution  Findings suggests tibioperoneal disease  Ankle/Brachial index: unobtainable secondary to indistinguishable Doppler signal, previous study 0 74  The PVR tracings at the ankle and metatarsal levels are flat lined with the PPG waveform of the great toe is flat lined, suggesting poor distal perfusion, therefore, pressures are unobtainable  In comparison to the studies dated 10/24/2022 and 11/1/2022, there is progression in the disease process bilaterally  SIGNATURE: Electronically Signed by: Zuleyma Hong on 2022-11-09 12:42:16 PM    VAS lower limb arterial duplex, complete bilateral    Result Date: 10/24/2022  Narrative:  [facility] REPORT [facility]INICAL: Indications:  Left heel ulceration  Risk Factors: Obesity, HTN, Diabetes, CVA, Hyperlipidemia and previous smoking  Clinical Right Pressure:  158/ mm Hg, Left Pressure:  168/ mm Hg  FINDINGS:  Segment                Right       Left                                          PSV (cm/s)  PSV (cm/s)  Common Femoral Artery          72         103  Prox Profunda                  92         164  Prox SFA                       86          45  Mid SFA                        93          60  Dist SFA                       57          97  Proximal Pop                   83          63  Distal Pop                    111          79  Tibioperoneal                 100          96  Dist Post Tibial               38           7  Dist  Ant  Tibial              36          46     CONCLUSION: Impression: RIGHT LOWER LIMB: Monophasic waveforms in the common femoral artery suggesting more proximal disease  Diffuse atherosclerotic disease throughout the femoral-popliteal arteries without focal stenosis  Findings suggests tibioperoneal disease  Ankle/Brachial index:  0 80, moderate claudication range  PVR/ PPG tracings are dampened   Metatarsal pressure of 64 mm Hg Great toe pressure of 61 mm Hg, above healing threshold for a diabetic  LEFT LOWER LIMB: Diffuse atherosclerotic disease throughout the femoral-popliteal arteries without focal stenosis  Findings suggests tibioperoneal disease  Ankle/Brachial index:  0 63, severe claudication range  PVR/ PPG tracings are dampened  Metatarsal pressure of 21 mm Hg Great toe pressure of 35 mm Hg, below healing threshold for a diabetic  SIGNATURE: Electronically Signed by: Twin Baez on 2022-10-24 02:00:50 PM    VAS lower limb arterial duplex, limited, unilateral    Result Date: 11/2/2022  Narrative:  [facility] REPORT CLINICAL: Indications:  Evaluated the right CFA and ilaic due to SHARIF decrease after angio/ stent  Operative History: 2022-10-31 Abdominal aortogram 2022-10-31 Right ALLISON Stent 2022-10-31 Right PTA stent 2022-10-31 Left SFA shockwave angioplasty Risk Factors The patient has history of Obesity, HTN, Diabetes (IDDM), Hyperlipidemia and previous smoking (quit >10yrs ago)  FINDINGS:  Right                  PSV (cm/s)  Dist EIA                      682  Common Femoral Artery         825  Prox SFA                      391     CONCLUSION:   Impression: There is a high grade stenosis in the right common femoral artery / proximal femoral artery  SIGNATURE: Electronically Signed by: Twin Baez on 2022-11-02 04:58:20 PM    VAS SHARIF & waveform analysis, multiple levels    Result Date: 11/1/2022  Narrative:  [facility] REPORT CLINICAL: Indications:  SHARIF follow post-op aortogram, LLE run off, PTA stent  Operative History: 2022-10-31 Abdominal aortogram 2022-10-31 Left ALLISON Stent 2022-10-31 Left PTA stent 2022-10-31 Left SFA shockwave angioplasty Risk Factors The patient has history of Obesity, HTN, Diabetes (IDDM), Hyperlipidemia and previous smoking (quit >10yrs ago)  Clinical Right Pressure:  138/ mm Hg, Left Pressure:  158/ mm Hg    FINDINGS:  Segment       Ri  Left                         P    P  Peroneal      76   91  Post  Tibial  81  117  Ankle 81  117  Metatarsal    45   85  Great Toe     50   37     CONCLUSION:  Impression RIGHT LOWER LIMB Ankle/Brachial Index:0 51 wiyh in severe limits (prior 0 63) PPG/PVR Tracings are dampened  Metatarsal Pressure 45 mmHg Great Toe Pressure: 50 mmHg, within the healing range  LEFT LOWER LIMB Ankle/Brachial Index: 0 74 within moderate limits (prior 0 63) PPG/PVR Tracings are dampened  Metatarsal Pressure 85 mmHg Great Toe Pressure: 37 mmHg, below the healing range  SIGNATURE: Electronically Signed by: Bonny Harrison on 2022-11-01 03:41:46 PM    VAS lower limb vein mapping bypass graft    Result Date: 11/3/2022  Narrative:  [facility] REPORT CLINICAL: Indications: Patient presents for a general health evaluation secondary to future open heart surgery  Patient is asymptomatic at this time  Operative History: 2022-10-31 Abdominal aortogram 2022-10-31 Right ALLISON Stent 2022-10-31 Right PTA stent 2022-10-31 Left SFA shockwave angioplasty Risk Factors The patient has history of Obesity, HTN, Diabetes (IDDM), Hyperlipidemia and previous smoking (quit >10yrs ago)  FINDINGS:  Segment         Right        Left                            Diameter AP  Diameter AP  GSV Inguinal            9 8          6 1  GSV Prox Thigh          3 5          4 9  GSV Mid Thigh           3 2          3 0  GSV Dist Thigh          3 0          4 7  GSV Knee                2 9          3 3  GSV Prox Calf           3 4          3 3  GSV Mid Calf            3 4          3 7  GSV Dist Calf           3 5          3 3  SSV Mid Calf            2 5          2 0  SSV Knee                1 9          2 1  SSV Ankle               2 5          2 4     CONCLUSION:  Impression: RIGHT LOWER LIMB: The great saphenous vein is patent  from the groin to the ankle  The vein measures >2mm in caliber from the groin to the distal calf  LEFT LOWER LIMB: The great saphenous vein is patent  from the groin to the ankle   The vein measures >2mm in caliber from the groin to the distal calf  SIGNATURE: Electronically Signed by: Ursula Monaco on 2022-11-03 09:45:42 AM    IR aortagram with run-off    Result Date: 11/1/2022  Narrative: PROCEDURE: Ultrasound-guided access of the right common femoral artery  Right common femoral artery sheath arteriogram  Aortogram  Left lower extremity runoff arteriogram  Shockwave balloon lithotripsy of proximal SFA  Proximal SFA Rosalinda stent placement  Shockwave balloon lithotripsy of the distal SFA  Distal SFA Rosalinda stent placement  Postintervention SFA and lower leg arteriograms  Right common iliac artery arteriogram  Right common iliac artery Express stent placement  Post stent placement RCIA arteriogram  Successful right common femoral artery closure with a ProGlide device  STAFF: KELLY Rodriguez  CONTRAST: 180 mL Visapaque FLUOROSCOPY TIME: 34 8 minutes  NUMBER OF IMAGES: Multiple COMPLICATIONS: None  MEDICATIONS: Local lidocaine  Heparin: 6000 units Moderate sedation with fentanyl and Versed was monitored by radiology nursing staff  Monitoring of conscious sedation totaled 180 minutes  INDICATION: Nonhealing left foot /heel wounds  CTA and arterial duplex ultrasound with evidence of significant peripheral vascular disease  PROCEDURE: The patient was placed supine on the procedure table  The right groin was prepped and draped in a sterile manner  Timeout was performed  Ultrasound-guided access of the right common femoral artery was obtained with a micropuncture needle  The access was upsized and a 5 [place] sheath was placed  Access arteriogram was used to confirm that the sheath was not occlusive  Using a SOS flush catheter,  access to the contralateral SFA was obtained with a Bentson wire, over which a 6 [place] by 65 cm destination sheath was placed within the left distal external iliac artery   A limited arteriogram was performed through the sheath at the aortic bifurcation, to confirm that the sheath was not occlusive within the known right common iliac artery proximal lesion  Left lower leg runoff arteriogram was performed, demonstrating a proximal SFA focal severe stenosis secondary to eccentric calcific atherosclerotic disease  A similar lesion was identified near the region of the abductor canal  The remainder of the SFA, popliteal artery are patent without significant stenosis  2 vessel runoff via the anterior tibial and peroneal arteries  A 0 014 wire was then advanced across the proximal SFA lesion  Shockwave balloon lithotripsy was performed for several rounds with a 5 mm x 6 cm Shockwave balloon  Postintervention arteriogram revealed no significant change within the focal eccentric plaque  A 6 mm x 6 cm Rosalinda stent was placed across this region, post dilated with a 5 mm  balloon  At completion no significant residual stenosis  The wire was then advanced across the distal SFA lesion  Shockwave balloon lithotripsy was performed for several rounds as above  This resulted in irregular dissection proximal to the lesion  The dissection as well as residual stenosis was stented with 6mm x 10cm Rosalinda stent postdilated with a 5 mm  balloon  Postintervention arteriogram with no significant residual stenosis  Completion left lower leg and foot arteriograms were performed, demonstrating preserved 2 vessel runoff as described above, with qualitatively increased rate of flow to the foot  The sheath was then retracted to the proximal right common iliac artery  Arteriogram was performed demonstrating up to 75% stenosis secondary to a focal region of eccentric calcific plaque  This was stented with a 7 mm x 17mm Express Stent  Post intervention arteriogram without residual stenosis  Successful access closure with a Proglide device  FINDINGS: 1  Ultrasound demonstrated the right common femoral artery to the patent   2  Left lower leg runoff arteriogram demonstrated a proximal SFA focal severe stenosis secondary to eccentric calcific atherosclerotic disease  A similar lesion was identified near the region of the abductor canal  The remainder of the SFA, popliteal artery are patent without significant stenosis  2 vessel runoff via the anterior tibial and peroneal arteries  Successful SFA shockwave lithotripsy and Rosalinda stent placement x 2 as described above  3  Mid right common iliac artery severe focal stenosis secondary to eccentric calcific plaque  Successful Express stent placement as described above  4  Preserved two vessel runoff on the left via the anterior tibial and peroneal arteries with qualitatively increased rate of flow compared to the preintervention arteriogram      Impression: Left lower leg arteriogram with proximal and distal SFA shockwave balloon lithotripsy and stent placement, as well as right common iliac artery stent placement  PLAN: Follow-up arterial duplex  Patient became short of breath after the procedure, with increased O2 requirement  Etiology most likely volume overload secondary to renal prep as well as contrast and fluid was given during the procedure where she was lying flat  Dr Karen Johnson from the primary team made aware  Chest x-ray to be obtained upon arrival to the floor  Workstation performed: [id number]     XR chest portable ICU    Result Date: 11/9/2022  Narrative: CHEST INDICATION:   Increased o2 requirement  COMPARISON:  10/31/2022 EXAM PERFORMED/VIEWS:  XR CHEST PORTABLE ICU FINDINGS:  Patient rotation limits evaluation of the cardiomediastinal structures  Grossly stable cardiomediastinal structures  Central pulmonary vascular congestion with perihilar and basilar interstitial edema  No large effusions  No pneumothorax  No acute osseous abnormality  Stable bony structures  Impression: Moderate pulmonary edema, similar to the prior examination  No large effusions   Workstation performed: [id number]     CT lower extremity w contrast left    Result Date: 10/22/2022  Narrative: CT left lower extremity with IV contrast INDICATION: L foot necrosis  COMPARISON: None TECHNIQUE: CT examination of the above was performed  This examination, like all CT scans performed in the [facility], was performed utilizing techniques to minimize radiation dose exposure, including the use of iterative reconstruction  and automated exposure control software  Sagittal and coronal two dimensional reconstructed images were also submitted for interpretation  IV Contrast: 100 mL of iohexol (OMNIPAQUE) Rad dose  592 mGy-cm FINDINGS: OSSEOUS STRUCTURES:  No acute fracture, dislocation or destructive osseous lesion  Mild plantar calcaneal spurring  Mild degenerative changes of the ankle joint VISUALIZED MUSCULATURE:  Unremarkable  SOFT TISSUES:  Atherosclerosis  Moderate superficial soft tissue edema in the foot, ankle, and leg, superimposed cellulitis considered in the appropriate clinical setting  No discrete drainable collection identified  OTHER PERTINENT FINDINGS:  Enthesopathy of the anterior patella along the extensor mechanism attachment  No discrete subcutaneous gas is identified     Impression: No acute bony process is seen  Moderate superficial soft tissue edema in the foot, ankle, and leg, superimposed cellulitis considered in the appropriate clinical setting  Correlation with patient's symptoms and laboratory values recommended  No discrete drainable collection identified  Other findings as above  Workstation performed: [id number]     MRI follow [facility]    Result Date: 10/28/2022  Narrative: MRI LEFT FOOT INDICATION:   Inpatient Order left heel eschar, unknown depth, rule out osteomyelitis  COMPARISON: Correlation is made with the prior CT study dated 10/21/2022  TECHNIQUE:  MR sequences were obtained of the left foot including the following:  Localizer, no further images were obtained as the patient could not tolerate the examination due to pain   Imaging performed on 1 5T MRI Gadolinium was not used FINDINGS: The study is nondiagnostic is only localizer images were obtained  The patient could not continue the examination due to underlying pain  Impression: Nondiagnostic study  Workstation performed: [id number]     Echo complete w/ contrast if indicated    Result Date: 10/30/2022  Narrative: •  Left Ventricle: Left ventricular cavity size is normal  There is mild to moderate concentric hypertrophy  The left ventricular ejection fraction is 60% by visual estimation  Systolic function is normal  Wall motion is normal  Diastolic function is normal  •  Right Ventricle: Right ventricular cavity size is normal  Systolic function is normal  •  Aortic Valve: There is aortic sclerosis without clear visualization of the valve leaflets  •  Mitral Valve: There is mild annular calcification  There is trace regurgitation  •  Tricuspid Valve: There is trace regurgitation  Pulmonary artery systolic pressures are estimated at 31 mm Hg  •  There is no study for comparison  EKG/Pathology/Other Studies:   Lab Results   Component Value Date    VENTRATE 79 10/22/2022    ATRIALRATE 79 10/22/2022    PRINT 146 10/22/2022    QRSDINT 88 10/22/2022    QTINT 428 10/22/2022    QTCINT 490 10/22/2022    PAXIS 36 10/22/2022    QRSAXIS 35 10/22/2022    TWAVEAXIS 12 10/22/2022        Code Status: Level 1 - Full Code  Advance Directive and Living Will:      Power of :    POLST:      Counseling / Coordination of Care: Total floor / unit time spent today 30 minutes  Greater than 50% of total time was spent with the patient and / or family counseling and / or coordination of care  A description of the counseling / coordination of care:  Chart review, patient evaluation, coordination and communication with staff, nursing and provider

## 2022-11-10 NOTE — ASSESSMENT & PLAN NOTE
Patient has uncontrolled diabetes, hypertension, hyperlipidemia, patient was lost to follow-up and has not seen endocrinologist for over a year  Admits that she is poorly compliant with medication  Admitted due to Worsening lower extremity wounds  · CTA abd/pelvis:   § 2   LEFT: Up to 50% stenosis of the proximal common iliac artery   The proximal and distal 3rd of the superficial femoral artery demonstrates 50-75% stenosis   Popliteal artery demonstrates greater than 75% stenosis in the P2 segment   Patent   three-vessel runoff extending into the left foot  § 3  RIGHT: Proximal common iliac artery stenosis of up to 50%   50% stenosis of the common femoral artery   Superficial femoral artery demonstrates up to 50% stenosis within the proximal several centimeters, 50-75% stenosis throughout the distal 3rd of   the vessel   Popliteal artery demonstrates scattered stenoses of 50-75%   Patent three-vessel runoff extending into the right foot  · S/p abdominal aortogram,  LLE SFA shockwave angioplasty/FATOU 10/31/22       · Patient is status post IR bilateral intervention 11/10/2022    1  Disrupted proximal margin of right common iliac artery stent with stenosis and possible stent thrombosis treated with aspiration thrombectomy, 7 mm PTA, and 8 mm Express LD balloon expandable stent placement with good result  Palpable right common femoral pulse  No residual stenosis  No embolus to the right common femoral bifurcation  2  Thrombosed distal left SFA stent treated with aspiration thrombectomy, PTA with distal protection, and additional 6 x 60 mm loop via stent placement across distal margin with good result  No residual stenosis  3  Diffuse left popliteal artery disease treated with 4 mm angioplasty with no residual stenosis  4  Patent anterior tibial artery but with abnormal flow distally which may be related lack of outflow  4 mg tPA instilled into the anterior tibial artery    No further intervention performed  5  Mynx closure right common femoral artery  Successful      Plan  As per vascular team  Will continue close neuro checks  Heparin infusion  DAPT and atorvastatin 40

## 2022-11-10 NOTE — ASSESSMENT & PLAN NOTE
Lab Results   Component Value Date    HGBA1C 9 8 (H) 10/25/2022       Recent Labs     11/09/22  2122 11/10/22  0746 11/10/22  1121 11/10/22  1710   POCGLU 122 125 155* 153*       Blood Sugar Average: Last 72 hrs:  (P) 845 [id number]     Podiatry on board with recommendations  below appreciated  · No immediate plan for podiatric surgical intervention  Dressings changed, no clinical sign of acute infection B/L  · Continue local wound care, betadine DSD  Appreciate nursing assistance with dressing changes  · Elevation on green foam wedges or pillows when non-ambulatory    · Rest of care per primary team

## 2022-11-10 NOTE — PROGRESS NOTES
H&P reviewed  /67   Pulse 80   Temp 98 9 °F (37 2 °C) (Oral)   Resp (!) 23   Ht 5' 3 75" (1 619 m)   Wt 116 kg (255 lb 11 7 oz)   SpO2 95%   BMI 44 24 kg/m²     Prior imaging was reviewed  In brief, 81-year-old woman with peripheral vascular disease and bilateral foot wounds status post extensive bilateral leg intervention x2  CTA yesterday shows acute right common iliac artery stent occlusion and acute distal left SFA stent occlusion with thrombus extending into the popliteal artery  Right foot is mottled concerning for atheroembolic event  She has motor loss  Will attempt to reopen right common iliac artery stent with thrombectomy and additional intervention as needed to maximize perfusion to the right leg  Plan to also intervene on acutely occluded left SFA stent to improve perfusion to left heel wound which may involve thrombectomy and further intervention  Patient is only briefly arousable  Uncertain if this is medication related but she has not received any medications recently that would account for this     Per patient's nurse her alertness has wax and wane  I discussed procedure with patient's daughter, Bhargav Boyce, by telephone  She is aware that Roberta Solano is here for another intervention today  This is a high risk endovascular procedure with risk of further embolization and vascular injury as we are planning to access right common femoral artery again  Will try to limit contrast dose though her renal function is improved today  Given her lethargy and respiratory status, we will plan to proceed with very limited IV sedation  All questions answered  Informed written consent was obtained      Silvia Ramos MD

## 2022-11-10 NOTE — ASSESSMENT & PLAN NOTE
· In setting of uncontrolled diabetes and known PAD  · MRI of the foot noted for "a large plantar lateral heel ulcer, without underlying soft tissue abscess  · Received Zosyn, no further abx per ID  · Continue aspirin, Plavix and statin  IR arteriogram completed on 10/31 left  lower leg arteriogram with proximal and distal SFA shockwave balloon lithotripsy and stent placement, as well as right common iliac artery stent placement  · Repeat imaging show increased velocity with flow limiting dissection of right lower limb  · Heparin gtt  · May need amputation     CTA performed 11/09 showed occlusion of previously placed stent,   "Suspect interval occlusion of recently placed proximal right common iliac artery stent"  ·

## 2022-11-10 NOTE — CASE MANAGEMENT
Case Management Discharge Planning Note    Patient name [address] [place] [id number]  Location ICU /ICU  MRN [id number]  : 1960 Date 11/10/2022       Current Admission Date: 10/21/2022  Current Admission Diagnosis:PAD (peripheral artery disease) [facility])   Patient Active Problem List    Diagnosis Date Noted   • Acute on chronic anemia 10/30/2022   • Generalized edema due to fluid overload 10/27/2022   • PAD (peripheral artery disease) ([address] ) 10/25/2022   • Non healing left heel wound 10/22/2022   • Hypertensive urgency 10/22/2022   • Wound of lower extremity 10/21/2022   • Epigastric pain 2022   • Type 2 diabetes mellitus with hyperglycemia, with long-term current use of insulin ([address] ) 10/18/2021   • Hyperparathyroidism ([address] ) 10/18/2021   • 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 ([address] ) 2021   • 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   • Moderate protein-calorie malnutrition ([address] ) 2020   • Acute colitis 2020   • Arthritis 2019   • Depression, recurrent ([address] ) 2018   • Class 3 severe obesity due to excess calories with serious comorbidity and body mass index (BMI) of 40 0 to 44 9 in adult ([address] ) 2018   • Acute respiratory failure with hypoxia ([address] ) 2018   • Esophageal reflux 2018   • Uncontrolled type 2 diabetes with neuropathy 2018   • Diabetic peripheral neuropathy ([address] )    • Systolic murmur    • Cerebral infarction ([address] ) 2015   • Anxiety 2015   • Vitamin D deficiency 2015   • Benign essential hypertension 2012   • Familial combined hyperlipidemia 2012      LOS (days): 19  Geometric Mean LOS (GMLOS) (days):   Days to GMLOS:     OBJECTIVE:  Risk of Unplanned Readmission Score: 26 68         Current admission status: Inpatient   Preferred Pharmacy:   [facility]/pharmacy #[id number]- [place], [place] - [address]  [address]  [address]  Phone: [phone] Fax: [phone]    Primary Care Provider: ALEX Rowan    Primary Insurance: BLUE CROSS  Secondary Insurance:     DISCHARGE DETAILS:    Discharge planning discussed with[de-identified] Patient's daughter Mercedes Gerber of Choice: Yes  Comments - Freedom of Choice: Per daughter, first choice from currently accepting facilities is [facility], second choice is [facility]  Due to patient requiring further interventions for RLE, CM extended deadline in [facility]  CM and daughter/patient to touch base again regarding facility choice when closer to dc

## 2022-11-10 NOTE — PROGRESS NOTES
NEPHROLOGY PROGRESS NOTE   Ashwin Poole 65 y o  female MRN: [id number]  Unit/Bed#: ICU 01 Encounter: [id number]  Reason for Consult: NYIDA    ASSESSMENT AND PLAN:  Mild NYDIA, baseline creatinine 1 0 in early 2022, prior 0 7 to 0 9  -creatinine 1 1 on admission had increased up to 1 4 during hospitalization, now seems to have overall improved 0 9 today  -status post multiple contrast exposure on 10/31, 11/1, 11/7 and most recent on 11/9   -closely monitor for SANTOS  -plan noted for IR angiogram today  Clinically seems volume overloaded, requiring stable 2 to 3 L O2 via nasal cannula although desats on attempt to wean her on room air as per discussion with ICU nurse  Also has some wheezing  Unfortunately given volume overload status, will not be able to prep with IV fluid today   -monitor without IV fluid and without Lasix today  If she has worsening respiratory status, may have low threshold to give Lasix p r n     -okay to discontinue Jacinto catheter from renal standpoint  -recent UA showed 3+ proteinuria, innumerable RBCs, WBCs, bacteria, very concentrated sample   -would like repeat UA with microscopy once Jacinto catheter is removed      Proteinuria, no recent UPC ratio available, prior urine microalbumin/creatinine ratio 2 7 g in 2017  -suspect in the setting of uncontrolled diabetes, last hemoglobin A1c 9 8 in October 2022   This ranges from 9 to 13 going back to 2019  -on lisinopril 40 mg daily as outpatient, currently remains on hold       Hypertension  -BP fluctuating and above goal  -outpatient regimen includes lisinopril 40 mg daily, Toprol-XL 25 mg daily, amlodipine 10 mg daily   -continue to avoid lisinopril  -continue amlodipine 10 mg daily, Coreg 25 mg b i d , started on doxazosin yesterday, increase dose to 2 mg daily   -goal SBP 130s     Significant PVD, status post lower extremity angiogram, right CFA balloon angioplasty, stent placement, atherectomy  -vascular surgery follow-up     Clinically seems mildly volume overloaded, O2 requirement overall stable, currently remains on 2 to 3 L O2 via nasal cannula   -most recent echo shows EF 82%, normal diastolic function  -monitor off IV fluid      Discussed above plan in detail with ICU team     SUBJECTIVE:  Patient seen and examined at bedside  Did have episode of dyspnea earlier today  Denies nausea vomiting    Complaining of lower extremity pain    OBJECTIVE:  Current Weight: Weight - Scale: 116 kg (255 lb 11 7 oz)  Vitals:    11/10/22 0916   BP: 141/79   Pulse: 80   Resp: 21   Temp:    SpO2: 92%       Intake/Output Summary (Last 24 hours) at 11/10/2022 0956  Last data filed at 11/10/2022 0800  Gross per 24 hour   Intake 1049 42 ml   Output 1970 ml   Net -920 58 ml     Wt Readings from Last 3 Encounters:   11/10/22 116 kg (255 lb 11 7 oz)   10/26/22 98 4 kg (216 lb 14 9 oz)   10/21/22 98 4 kg (217 lb)     Temp Readings from Last 3 Encounters:   11/10/22 99 2 °F (37 3 °C) (Oral)   10/21/22 97 7 °F (36 5 °C) (Temporal)   03/22/22 98 6 °F (37 °C)     BP Readings from Last 3 Encounters:   11/10/22 141/79   10/21/22 160/90   04/27/22 138/70     Pulse Readings from Last 3 Encounters:   11/10/22 80   10/21/22 99   03/22/22 92        Physical Examination:  General:  Lying in bed, no acute distress   Eyes:  Mild conjunctival pallor present  ENT:  External examination of ears and nose unremarkable  Neck:  No obvious lymphadenopathy appreciated  Respiratory:  Bilateral air entry present, mild expiratory wheezing noted  CVS:  S1, S2 present  GI:  Soft nondistended  CNS:  Active alert oriented x3  Skin:  Discoloration in right foot  Musculoskeletal:  No obvious new gross deformity noted    Medications:    Current Facility-Administered Medications:   •  acetaminophen (TYLENOL) tablet 650 mg, 650 mg, Oral, Q6H PRN, Ryan Mcarthur MD, 650 mg at 11/09/22 0354  •  acetaminophen (TYLENOL) tablet 650 mg, 650 mg, Oral, Q8H [address], Carl Lama, DO  •  amLODIPine (NORVASC) tablet 10 mg, 10 mg, Oral, Daily, Staci Fairchild MD, 10 mg at 11/10/22 0801  •  atorvastatin (LIPITOR) tablet 40 mg, 40 mg, Oral, Daily With Mando Espinoza MD, 40 mg at 11/09/22 1623  •  carvedilol (COREG) tablet 25 mg, 25 mg, Oral, BID With Meals, Staci Fairchild MD, 25 mg at 11/10/22 0801  •  clopidogrel (PLAVIX) tablet 75 mg, 75 mg, Oral, Daily, Staci Fairchild MD, 75 mg at 11/10/22 7425  •  doxazosin (CARDURA) tablet 1 mg, 1 mg, Oral, Daily, Cornelio Oviedo MD, 1 mg at 11/10/22 0801  •  gabapentin (NEURONTIN) capsule 200 mg, 200 mg, Oral, BID, Staci Fairchild MD, 200 mg at 11/10/22 0801  •  heparin (porcine) 25,000 units in 0 45% NaCl 250 mL infusion (premix), 3-30 Units/kg/hr (Order-Specific), Intravenous, Titrated, Staci Fairchild MD, Last Rate: 17 6 mL/hr at 11/10/22 0517, 16 Units/kg/hr at 11/10/22 0517  •  heparin (porcine) injection 4,400 Units, 4,400 Units, Intravenous, Q1H PRN, Staci Fairchild MD, 4,400 Units at 11/08/22 2140  •  heparin (porcine) injection 8,800 Units, 8,800 Units, Intravenous, Q1H PRN, Staci Fairchild MD, 1,043 Units at 11/07/22 2152  •  hydrALAZINE (APRESOLINE) injection 10 mg, 10 mg, Intravenous, Q6H PRN, Staci Fairchild MD, 10 mg at 11/09/22 1705  •  HYDROmorphone (DILAUDID) injection 0 5 mg, 0 5 mg, Intravenous, Q1H PRN, Yoko Burr DO  •  insulin glargine (LANTUS) subcutaneous injection 25 Units 0 25 mL, 25 Units, Subcutaneous, HS, Staci Fairchild MD, 25 Units at 11/09/22 2130  •  insulin lispro (HumaLOG) 100 units/mL subcutaneous injection 2-12 Units, 2-12 Units, Subcutaneous, TID AC, 2 Units at 11/09/22 1628 **AND** Fingerstick Glucose (POCT), , , TID AC, Staci Fairchild MD  •  levalbuterol [facility]) inhalation solution 1 25 mg, 1 25 mg, Nebulization, TID, Carl Short DO  •  LORazepam (ATIVAN) tablet 0 5 mg, 0 5 mg, Oral, Q8H PRN, Staci Fairchild MD, 0 5 mg at 11/09/22 0746  •  metoclopramide (REGLAN) injection 5 mg, 5 mg, Intravenous, Q6H PRN, Griselda Brenner MD, 5 mg at 10/23/22 1059  •  naloxone (NARCAN) 0 04 mg/mL syringe 0 04 mg, 0 04 mg, Intravenous, Q1MIN PRN, Danielle Bai DO  •  nystatin (MYCOSTATIN) powder, , Topical, BID, Griselda Brenner MD, 1 application at 43/78/61 0804  •  [facility] injection 4 mg, 4 mg, Intravenous, Q6H PRN, Griselda Brenner MD, 4 mg at 11/08/22 6376  •  oxyCODONE (ROXICODONE) immediate release tablet 10 mg, 10 mg, Oral, Q4H PRN, Danielle Bai DO  •  oxyCODONE (ROXICODONE) IR tablet 5 mg, 5 mg, Oral, Q4H PRN, Danielle Bai DO  •  pantoprazole (PROTONIX) EC tablet 40 mg, 40 mg, Oral, Early Morning, Griselda Brenner MD, 40 mg at 11/10/22 7442  •  sertraline (ZOLOFT) tablet 100 mg, 100 mg, Oral, Daily, Griselda Brenner MD, 100 mg at 11/10/22 0801    Laboratory Results:  Results from last 7 days   Lab Units 11/10/22  0410 11/09/22  0439 11/08/22  0532 11/07/22  1520 11/07/22  0437 11/06/22  0458 11/05/22  0541   WBC Thousand/uL 16 06* 14 83* 16 57* 13 88* 8 74 8 87 9 62   HEMOGLOBIN g/dL 8 7* 8 6* 8 8* 8 7* 7 0* 7 8* 8 0*   HEMATOCRIT % 28 1* 28 2* 28 7* 27 2* 22 8* 25 7* 25 8*   PLATELETS Thousands/uL 60* 83* 105* 157 266 266 277   SODIUM mmol/L 140 141 139 140 137 138 138   POTASSIUM mmol/L 3 5 3 5 3 6 4 3 3 8 3 8 5 0   CHLORIDE mmol/L 103 105 104 104 100 99 100   CO2 mmol/L 28 26 24 24 28 30 31   BUN mg/dL 15 17 19 19 19 19 18   CREATININE mg/dL 0 97 1 02 1 20 1 21 1 40* 1 37* 1 37*   CALCIUM mg/dL 8 0* 7 8* 7 4* 7 5* 8 2* 8 6 8 5   MAGNESIUM mg/dL  --   --   --  1 7  --   --   --    PHOSPHORUS mg/dL  --   --   --  5 6*  --   --   --        CTA ABDOMEN W RUN OFF W WO CONTRAST   Final Result by Chad Rosales MD (11/09 7239)      1  Suspect interval occlusion of recently placed proximal right common iliac artery stent   2    No residual filling defect or dissection involving right common femoral artery  Diffusely small diseased right SFA and popliteal artery without focal flow-limiting stenosis identified  Patent recently placed SFA stent  3   Intact anterior tibial artery with stenotic and/or occluded dorsalis pedis artery  Posterior tibial artery occludes at the ankle  Patent peroneal artery without opacification of anterior and posterior communicating arteries  4   Near circumferentially calcified stenosis of proximal left common iliac artery  The lesion measures 30-40% in severity by reconstructions which are compromised by artifact and software limitations and therefore difficult to exclude a focal    flow-limiting stenosis within this segment  5   No significant left common femoral stenosis  SFA is diffusely diseased  Newly placed proximal stent is patent but distal SFA stent is occluded  Popliteal artery is diffusely stenotic  6   Left popliteal artery is diffusely stenotic which seems new from the prior arteriogram and may be related to acute stent occlusion  Left anterior tibial artery is intact  Posterior tibial artery occludes at the calcaneus  7   Small bilateral pleural effusions   8  Small volume ascites new from prior CT with diffuse subcutaneous edema            Workstation performed: [id number]         VAS lower limb arterial duplex, complete bilateral   Final Result by Rossy Leo MD (11/09 1242)      XR chest portable ICU   Final Result by Josue Tran DO (11/09 1612)      Moderate pulmonary edema, similar to the prior examination  No large effusions  Workstation performed: [id number]         VAS lower limb vein mapping bypass graft   Final Result by Rossy Leo MD (11/03 0931)      CTA ABDOMEN W RUN OFF W WO CONTRAST   Final Result by Esperanza Coffman MD (11/02 8482)   Vascular:   Left lower extremity:   Interval stenting and lithotripsy of the left superficial femoral artery        Right lower extremity:   1  Interval development of a probable flow-limiting dissection flap at the prior arteriotomy site in the right common femoral artery superimposed upon moderate to severe atherosclerotic disease  2   Focal severe atherosclerotic narrowing at the adductor canal with additional moderate atherosclerotic narrowing throughout the remainder of the right superficial femoral artery  The study was marked in EPIC for significant notification  Workstation performed: [id number]         VAS lower limb arterial duplex, limited, unilateral   Final Result by Nico Vaca MD (11/02 1658)      VAS SHARIF & waveform analysis, multiple levels   Final Result by Nico Vaca MD (11/01 1541)      XR chest portable   Final Result by Yue Cruz MD (10/31 1718)      Pulmonary edema has increased since October 26, 2022  Workstation performed: [id number]         IR aortagram with run-off   Final Result by Giles Le MD (11/01 9720)      Left lower leg arteriogram with proximal and distal SFA shockwave balloon lithotripsy and stent placement, as well as right common iliac artery stent placement  PLAN:      Follow-up arterial duplex  Patient became short of breath after the procedure, with increased O2 requirement  Etiology most likely volume overload secondary to renal prep as well as contrast and fluid was given during the procedure where she was lying flat  Dr Addis Melissa from the    primary team made aware  Chest x-ray to be obtained upon arrival to the floor  Workstation performed: [id number]         MRI follow up [facility]   Final Result by Lisa Summers MD (10/28 9564)      Nondiagnostic study  Workstation performed: [id number]         CTA ABDOMEN W RUN OFF W WO CONTRAST   Final Result by Giles Le MD (10/28 2057)      1  Moderate stenosis of the mid infrarenal abdominal aorta secondary to calcific atherosclerotic disease        2  LEFT: Up to 50% stenosis of the proximal common iliac artery  The proximal and distal 3rd of the superficial femoral artery demonstrates 50-75% stenosis  Popliteal artery demonstrates greater than 75% stenosis in the P2 segment  Patent    three-vessel runoff extending into the left foot  3  RIGHT: Proximal common iliac artery stenosis of up to 50%  50% stenosis of the common femoral artery  Superficial femoral artery demonstrates up to 50% stenosis within the proximal several centimeters, 50-75% stenosis throughout the distal 3rd of    the vessel  Popliteal artery demonstrates scattered stenoses of 50-75%  Patent three-vessel runoff extending into the right foot  Workstation performed: [id number]         MRI ankle/heel left  wo contrast   Final Result by Yunior Powers MD (10/27 1949)      Exam moderately limited due to patient motion  There is a large plantar lateral heel ulcer, without underlying soft tissue abscess (series 5 image 5-15 )      Associated with this ulcer, there is only very minimal reactive marrow edema in the calcaneal tubercle evident on T2-weighted sequences (series 3 image 2, series 7 image 8 ) No confluent T1-weighted marrow abnormality or cortical destruction to suggest    osteomyelitis  Workstation performed: [id number]         XR chest portable   Final Result by Amber Armijo MD (10/27 2228)      Mild pulmonary vascular congestion  Workstation performed: [id number]         VAS lower limb arterial duplex, complete bilateral   Final Result by Luke Mckeon MD (10/24 1400)      CT lower extremity w contrast left   Final Result by Cameron Montano DO (10/22 0102)      No acute bony process is seen  Moderate superficial soft tissue edema in the foot, ankle, and leg, superimposed cellulitis considered in the appropriate clinical setting  Correlation with patient's symptoms and laboratory values recommended    No discrete drainable collection    identified  Other findings as above  Workstation performed: [id number]         XR chest 1 view portable   ED Interpretation by Lucie Thorne PA-C (10/21 2203)   ED wet read:  Possible mild vascular congestion  Final Result by Ernie Boykin MD (09/29 2834)      No acute cardiopulmonary disease  Workstation performed: [id number]         XR foot 3+ views RIGHT   Final Result by Ernie Boykin MD (19/86 9162)      No acute osseous abnormality  Workstation performed: [id number]         IR lower extremity angiogram    (Results Pending)   IR lower extremity angiogram    (Results Pending)   XR chest portable    (Results Pending)       Portions of the record may have been created with voice recognition software  Occasional wrong word or "sound a like" substitutions may have occurred due to the inherent limitations of voice recognition software  Read the chart carefully and recognize, using context, where substitutions have occurred

## 2022-11-10 NOTE — ASSESSMENT & PLAN NOTE
· Continue other oral antihypertensive as above  · Continue hydralazine 10 mg every 6 hours p r n   For SBP greater than 180

## 2022-11-10 NOTE — UTILIZATION REVIEW
Continued Stay Review    Date: 11/10/2022                          Current Patient Class: inpatient    Current Level of Care: ICU     HPI:62 y o  female initially admit 10/22  Inpatient due to Non healing left heel wound, Hypertensive urgency, Abnormal EKG, Sepsis, Hypokalemia   Presenting symptoms LLE heel eschar and RLE gangrenous 5th toe, sepsis, and NYDIA    Assessment/Plan:   Vascular Surgery  S/p LLE angio with R ALLISON PTA, LLE SFA IVL/FATOU on 10/31, and s/p RLE angio with CFA DCB PTA, arthrectomy, SFA DCB/FATOU, pop DCB  on 11/07  Currently w symptomatic reocclusion of Right ALLISON stent with rest pain & skin mottling  Concern for arthro embolic event; Suspect diffuse pain secondary to diffuse cholesterol emboli from recent prolonged/complex endovascular intervention with skin changes in both lower extremities  Exam not consistent with compartment syndrome or reperfusion  IR  for repeat angiogram to attempt treatment of right ALLISON and left SFA  Continue medical management with plavix, statin, heparin drip  Unfortunately  prognosis is poor  She will require proximal amputation bilaterally, level to be decided pending today's intervention  Keep NPO   Nephrology:  Plan for IR angiogram today & clinically seems Volume overloaded   req 2-3 L NC unable to wean due to desats  Unfortunately due to vol status unable to prep w IVF today; monitor wo IVF & wo Lasix today; if worsening Resp status may have low threshold for PRN Lasix  DC Jacinto from Renal standpoint, repeat UA w microscopy once Jacinto out  Lisinopril on hold  HTN BP fluctuating above goal; started on Doxazosin yesterday & increase to 2 mg daily w SBP goal 130s   Vascular for significant PVD   Behavioral Health Tele consult   Recommend incr Zoloft to 150mg PO daily for depression & anxiety & ADD Seroquel 25mg PO Q HS as anti depressant adjunct; this may be titrated to effect; upon DC would benefit from OP Psyche to incl med management & Psychotherapy component, no suicide precautions needed   IR  CTA yesterday shows acute right common iliac artery stent occlusion and acute distal left SFA stent occlusion with thrombus extending into the popliteal artery  Right foot is mottled concerning for atheroembolic event   + motor loss  Will attempt to reopen right common iliac artery stent with thrombectomy and additional intervention as needed to maximize perfusion to the right leg    Plan to also intervene on acutely occluded left SFA stent to improve perfusion to left heel wound which may involve thrombectomy and further intervention  Vital Signs:   Date/Time Temp Pulse Resp BP MAP (mmHg) Arterial Line BP MAP SpO2 Calculated FIO2 (%) - Nasal Cannula O2 Flow Rate (L/min) Nasal Cannula O2 Flow Rate (L/min) O2 Device Patient Position - Orthostatic VS   11/10/22 1409 -- 78 -- -- -- -- -- 99 % -- 4 L/min -- Simple mask --   11/10/22 1404 -- 78 -- 193/75 Abnormal  112 -- -- 98 % -- 5 L/min -- Simple mask --   11/10/22 1400 -- -- -- -- -- -- -- -- -- 5 L/min -- Simple mask --   11/10/22 1359 -- 79 -- 191/72 Abnormal  113 -- -- 86 % Abnormal  -- 5 L/min -- Simple mask --   11/10/22 1354 -- 79 -- 173/67 Abnormal  102 -- -- 94 % -- 5 L/min -- Simple mask --   11/10/22 1349 -- 78 -- 150/69 104 -- -- 97 % -- 5 L/min -- Simple mask --   11/10/22 1344 -- 78 -- 186/70 Abnormal  109 -- -- 97 % -- 5 L/min -- Simple mask --   11/10/22 1339 -- 78 -- 181/73 Abnormal  112 -- -- 97 % -- 5 L/min -- Simple mask --   11/10/22 1334 -- 78 -- 174/72 Abnormal  103 -- -- 95 % -- 5 L/min -- Simple mask --   11/10/22 1330 -- 78 -- 210/85 Abnormal  -- -- -- 95 % -- 6 L/min -- Simple mask --   11/10/22 1325 -- 78 -- 204/71 Abnormal  -- -- -- 96 % -- 6 L/min -- Simple mask --   11/10/22 1320 -- 80 -- 200/72 Abnormal  -- -- -- -- -- -- -- -- --   11/10/22 1145 -- 80 23 Abnormal  137/67 92 -- -- 95 % -- -- -- -- --   11/10/22 1118 -- 80  24 Abnormal   174/74 Abnormal   102  -- -- 95 %  -- -- -- -- --   Pulse: Simultaneous filing  User may not have seen previous data  at 11/10/22 1118   Resp: Simultaneous filing  User may not have seen previous data  at 11/10/22 1118   BP: Simultaneous filing  User may not have seen previous data  at 11/10/22 1118   MAP (mmHg): Simultaneous filing  User may not have seen previous data  at 11/10/22 1118   SpO2: Simultaneous filing  User may not have seen previous data  at 11/10/22 1118   11/10/22 1116 -- 80  29 Abnormal   187/69 Abnormal   107  -- -- 95 %  -- -- -- -- --   Pulse: Simultaneous filing  User may not have seen previous data  at 11/10/22 1116   Resp: Simultaneous filing  User may not have seen previous data  at 11/10/22 1116   BP: Simultaneous filing  User may not have seen previous data  at 11/10/22 1116   MAP (mmHg): Simultaneous filing  User may not have seen previous data  at 11/10/22 1116   SpO2: Simultaneous filing  User may not have seen previous data  at 11/10/22 1116   11/10/22 1031 98 9 °F (37 2 °C) -- -- -- -- -- -- -- -- -- -- -- --   11/10/22 1016 -- 80 18 161/59 95 -- -- -- -- -- -- -- --   11/10/22 0916 -- 80 21 141/79 118 -- -- 92 % -- -- -- -- --   11/10/22 0801 -- 84 26 Abnormal  179/69 Abnormal  -- -- -- 93 % -- -- -- -- --   11/10/22 0506 99 2 °F (37 3 °C) 82 18 161/62 92 -- -- 95 % -- -- -- Nasal cannula Lying   11/10/22 0218 -- 78 20 170/65 88 -- -- 96 % -- -- -- -- --   11/10/22 0118 -- 78 21 180/71 Abnormal  100 -- -- 95 % -- -- -- -- --   11/10/22 0018 -- 76 19 154/65 94 -- -- 95 % -- -- -- -- --         Pertinent Labs/Diagnostic Results:   11/9 CTA ABDOMEN W RUN OFF W WO CONTRAST   1   Suspect interval occlusion of recently placed proximal right common iliac artery stent   2   No residual filling defect or dissection involving right common femoral artery   Diffusely small diseased right SFA and popliteal artery without focal flow-limiting stenosis identified   Patent recently placed SFA stent     3   Intact anterior tibial artery with stenotic and/or occluded dorsalis pedis artery   Posterior tibial artery occludes at the ankle   Patent peroneal artery without opacification of anterior and posterior communicating arteries  4   Near circumferentially calcified stenosis of proximal left common iliac artery   The lesion measures 30-40% in severity by reconstructions which are compromised by artifact and software limitations and therefore difficult to exclude a focal   flow-limiting stenosis within this segment  5   No significant left common femoral stenosis   SFA is diffusely diseased   Newly placed proximal stent is patent but distal SFA stent is occluded   Popliteal artery is diffusely stenotic  6   Left popliteal artery is diffusely stenotic which seems new from the prior arteriogram and may be related to acute stent occlusion   Left anterior tibial artery is intact   Posterior tibial artery occludes at the calcaneus  7   Small bilateral pleural effusions   8   Small volume ascites new from prior CT with diffuse subcutaneous edema  11/9 CXR  Moderate pulmonary edema, similar to the prior examination   No large effusions     Results from last 7 days   Lab Units 11/08/22 1954   SARS-COV-2  Negative     Results from last 7 days   Lab Units 11/10/22  0410 11/09/22  0439 11/08/22  0532 11/07/22  1520 11/07/22  0437 11/06/22  0458 11/05/22  0541 11/04/22  0503   WBC Thousand/uL 16 06* 14 83* 16 57* 13 88* 8 74 8 87 9 62 11 80*   HEMOGLOBIN g/dL 8 7* 8 6* 8 8* 8 7* 7 0* 7 8* 8 0* 8 4*   HEMATOCRIT % 28 1* 28 2* 28 7* 27 2* 22 8* 25 7* 25 8* 27 1*   PLATELETS Thousands/uL 60* 83* 105* 157 266 266 277 284   NEUTROS ABS Thousands/µL  --   --   --   --   --  6 21 7 00 8 61*   BANDS PCT %  --   --   --  2  --   --   --   --          Results from last 7 days   Lab Units 11/10/22  0410 11/09/22  0439 11/08/22  0532 11/07/22  1520 11/07/22  0437   SODIUM mmol/L 140 141 139 140 137   POTASSIUM mmol/L 3 5 3 5 3 6 4 3 3 8   CHLORIDE mmol/L 103 105 104 104 100   CO2 mmol/L 28 26 24 24 28   ANION GAP mmol/L 9 10 11 12 9   BUN mg/dL 15 17 19 19 19   CREATININE mg/dL 0 97 1 02 1 20 1 21 1 40*   EGFR ml/min/1 73sq m 63 59 48 48 40   CALCIUM mg/dL 8 0* 7 8* 7 4* 7 5* 8 2*   CALCIUM, IONIZED mmol/L  --   --   --  0 99*  --    MAGNESIUM mg/dL  --   --   --  1 7  --    PHOSPHORUS mg/dL  --   --   --  5 6*  --      Results from last 7 days   Lab Units 11/07/22  1520   AST U/L 42   ALT U/L 23   ALK PHOS U/L 209*   TOTAL PROTEIN g/dL 6 1*   ALBUMIN g/dL 1 8*   TOTAL BILIRUBIN mg/dL 1 53*     Results from last 7 days   Lab Units 11/10/22  1121 11/10/22  0746 11/09/22  2122 11/09/22  1618 11/09/22  1121 11/09/22  0725 11/08/22  2112 11/08/22  1709 11/08/22  1231 11/08/22  0658 11/08/22  0537 11/08/22  0033   POC GLUCOSE mg/dl 155* 125 122 153* 142* 152* 169* 185* 192* 209* 202* 235*     Results from last 7 days   Lab Units 11/10/22  0410 11/09/22  0439 11/08/22  0532 11/07/22  1520 11/07/22  0437 11/06/22  0458 11/05/22  0541 11/04/22  0503   GLUCOSE RANDOM mg/dL 130 158* 216* 207* 135 167* 139 117             No results found for: BETA-HYDROXYBUTYRATE   Results from last 7 days   Lab Units 11/07/22  1938   PH ART  7 335*   PCO2 ART mm Hg 43 2   PO2 ART mm Hg 101 1   HCO3 ART mmol/L 22 5   BASE EXC ART mmol/L -3 2   O2 CONTENT ART mL/dL 14 2*   O2 HGB, ARTERIAL % 96 9   ABG SOURCE  Line, Arterial             Results from last 7 days   Lab Units 11/09/22  0439   CK TOTAL U/L 1,911*   CK MB INDEX % 2 1   CK MB ng/mL 39 3*             Results from last 7 days   Lab Units 11/10/22  1108 11/10/22  0410 11/09/22  1016   PTT seconds 94* 112* 84*                         Medications:   Scheduled Medications:  acetaminophen, 650 mg, Oral, Q8H MICHELLE  amLODIPine, 10 mg, Oral, Daily  atorvastatin, 40 mg, Oral, Daily With Dinner  carvedilol, 25 mg, Oral, BID With Meals  clopidogrel, 75 mg, Oral, Daily  [START ON 11/11/2022] doxazosin, 2 mg, Oral, Daily  gabapentin, 200 mg, Oral, BID  insulin glargine, 25 Units, Subcutaneous, HS  insulin lispro, 2-12 Units, Subcutaneous, TID AC  levalbuterol, 1 25 mg, Nebulization, TID  nystatin, , Topical, BID  pantoprazole, 40 mg, Oral, Early Morning  sertraline, 100 mg, Oral, Daily      Continuous IV Infusions:  heparin (porcine), 3-30 Units/kg/hr (Order-Specific), Intravenous, Titrated      PRN Meds:  acetaminophen, 650 mg, Oral, Q6H PRN  fentanyl citrate (PF), , Intravenous, Code/Trauma/Sedation Med  heparin (porcine), 4,400 Units, Intravenous, Q1H PRN  heparin (porcine), 8,800 Units, Intravenous, Q1H PRN  hydrALAZINE, 10 mg, Intravenous, Q6H PRN  hydrALAZINE, , , Code/Trauma/Sedation Med  HYDROmorphone, 0 5 mg, Intravenous, Q1H PRN  lidocaine (PF), , , Code/Trauma/Sedation Med  LORazepam, 0 5 mg, Oral, Q8H PRN  metoclopramide, 5 mg, Intravenous, Q6H PRN  naloxone, 0 04 mg, Intravenous, Q1MIN PRN  ondansetron, 4 mg, Intravenous, Q6H PRN  oxyCODONE, 10 mg, Oral, Q4H PRN  oxyCODONE, 5 mg, Oral, Q4H PRN        Discharge Plan: [facility]    Network Utilization Review Department  ATTENTION: Please call with any questions or concerns to [phone] and carefully listen to the prompts so that you are directed to the right person  All voicemails are confidential   Ramona Bustamante all requests for admission clinical reviews, approved or denied determinations and any other requests to dedicated fax number below belonging to the campus where the patient is receiving treatment   List of dedicated fax numbers for the Facilities:  [address] [place] [address] DENIALS (Administrative/Medical Necessity) [phone]   [address] (Maternity/NICU/Pediatrics) Milena Kendall 172 [phone]   Charlotte Zimmerman 77 [phone]   [address] 310 [facility] [place] 134 592 [facility] [phone]

## 2022-11-10 NOTE — SEDATION DOCUMENTATION
Pt transported back to ICU report given bedside   Groin assessed with RN and noted blister/boils on pt inner thighs, which is noted to be baseline per receiving nurse

## 2022-11-10 NOTE — TELEMEDICINE
e-Consult (IPC)  - Interventional Radiology  Ang Jeffery 65 y o  female MRN: [id number]  Unit/Bed#: ICU 01 Encounter: [id number]          Interventional Radiology has been consulted to evaluate Ang Jeffery    We were consulted by vascular surgery concerning this patient with LLE CLTI c SFA FATOU, RLE CLTI c R ALLISON BES, SFA FATOU  IP Consult to IR  Consult performed by: Deanne Meckel, CRNP  Consult ordered by: Damaris Kaplan DO        11/10/22    Assessment/Recommendation:     65year old female LLE intervention 10/31 and RLE intervention 11/7  Patient has significant pain in her feet and legs still  CTA runoff appears to show distal SFA stent occlusion  Plan for LLE angiogram  Patient and daughter are in agreement  Please see vascular notes for more details  - keep NPO   - plan for LLE angio with intervention today     5-10 minutes, >50% of the total time devoted to medical consultative verbal/EMR discussion between providers  Written report will be generated in the EMR  Thank you for allowing Interventional Radiology to participate in the care of Ang Jeffery  Please don't hesitate to call or TigerText us with any questions       [address] [place] [address]

## 2022-11-11 PROBLEM — D69.6 THROMBOCYTOPENIA (HCC): Status: ACTIVE | Noted: 2022-11-11

## 2022-11-11 LAB
ABO GROUP BLD BPU: NORMAL
ABO GROUP BLD BPU: NORMAL
ALBUMIN SERPL BCP-MCNC: 1.6 G/DL (ref 3.5–5)
ALP SERPL-CCNC: 112 U/L (ref 46–116)
ALT SERPL W P-5'-P-CCNC: 44 U/L (ref 12–78)
ANION GAP SERPL CALCULATED.3IONS-SCNC: 8 MMOL/L (ref 4–13)
APTT PPP: 101 SECONDS (ref 23–37)
APTT PPP: 104 SECONDS (ref 23–37)
APTT PPP: 84 SECONDS (ref 23–37)
APTT PPP: 86 SECONDS (ref 23–37)
APTT PPP: 86 SECONDS (ref 23–37)
APTT PPP: 90 SECONDS (ref 23–37)
AST SERPL W P-5'-P-CCNC: 98 U/L (ref 5–45)
BILIRUB DIRECT SERPL-MCNC: 0.12 MG/DL (ref 0–0.2)
BILIRUB SERPL-MCNC: 0.49 MG/DL (ref 0.2–1)
BLD SMEAR INTERP: NORMAL
BPU ID: NORMAL
BPU ID: NORMAL
BUN SERPL-MCNC: 18 MG/DL (ref 5–25)
CALCIUM SERPL-MCNC: 7.8 MG/DL (ref 8.3–10.1)
CHLORIDE SERPL-SCNC: 103 MMOL/L (ref 96–108)
CO2 SERPL-SCNC: 28 MMOL/L (ref 21–32)
CREAT SERPL-MCNC: 1 MG/DL (ref 0.6–1.3)
CROSSMATCH: NORMAL
CROSSMATCH: NORMAL
D DIMER PPP FEU-MCNC: 3.19 UG/ML FEU
DACRYOCYTES BLD QL SMEAR: PRESENT
ERYTHROCYTE [DISTWIDTH] IN BLOOD BY AUTOMATED COUNT: 15.6 % (ref 11.6–15.1)
FDP BLD QL AGGL: >10 <20
FIBRINOGEN PPP-MCNC: 680 MG/DL (ref 227–495)
GFR SERPL CREATININE-BSD FRML MDRD: 60 ML/MIN/1.73SQ M
GLUCOSE SERPL-MCNC: 141 MG/DL (ref 65–140)
GLUCOSE SERPL-MCNC: 141 MG/DL (ref 65–140)
GLUCOSE SERPL-MCNC: 142 MG/DL (ref 65–140)
GLUCOSE SERPL-MCNC: 146 MG/DL (ref 65–140)
GLUCOSE SERPL-MCNC: 161 MG/DL (ref 65–140)
HCT VFR BLD AUTO: 26.4 % (ref 34.8–46.1)
HGB BLD-MCNC: 8.2 G/DL (ref 11.5–15.4)
INR PPP: 3.7 (ref 0.84–1.19)
LYMPHOCYTES NFR BLD: 6 % (ref 14–44)
MACROCYTES BLD QL AUTO: PRESENT
MCH RBC QN AUTO: 28 PG (ref 26.8–34.3)
MCHC RBC AUTO-ENTMCNC: 31.1 G/DL (ref 31.4–37.4)
MCV RBC AUTO: 90 FL (ref 82–98)
MONOCYTES NFR BLD AUTO: 4 % (ref 4–12)
NEUTS SEG NFR BLD AUTO: 90 % (ref 45–77)
PLATELET # BLD AUTO: 36 THOUSANDS/UL (ref 149–390)
PLATELET # BLD AUTO: 40 THOUSANDS/UL (ref 149–390)
PLATELET BLD QL SMEAR: ABNORMAL
PMV BLD AUTO: 10.6 FL (ref 8.9–12.7)
PMV BLD AUTO: 9.7 FL (ref 8.9–12.7)
POLYCHROMASIA BLD QL SMEAR: PRESENT
POTASSIUM SERPL-SCNC: 3.3 MMOL/L (ref 3.5–5.3)
PROT SERPL-MCNC: 6 G/DL (ref 6.4–8.4)
PROTHROMBIN TIME: 36.4 SECONDS (ref 11.6–14.5)
RBC # BLD AUTO: 2.93 MILLION/UL (ref 3.81–5.12)
RBC MORPH BLD: PRESENT
SODIUM SERPL-SCNC: 139 MMOL/L (ref 135–147)
TOTAL CELLS COUNTED SPEC: 100
UNIT DISPENSE STATUS: NORMAL
UNIT DISPENSE STATUS: NORMAL
UNIT PRODUCT CODE: NORMAL
UNIT PRODUCT CODE: NORMAL
UNIT PRODUCT VOLUME: 350 ML
UNIT PRODUCT VOLUME: 350 ML
UNIT RH: NORMAL
UNIT RH: NORMAL
WBC # BLD AUTO: 15.69 THOUSAND/UL (ref 4.31–10.16)

## 2022-11-11 PROCEDURE — 85027 COMPLETE CBC AUTOMATED: CPT | Performed by: INTERNAL MEDICINE

## 2022-11-11 PROCEDURE — 85049 AUTOMATED PLATELET COUNT: CPT | Performed by: NURSE PRACTITIONER

## 2022-11-11 PROCEDURE — 85730 THROMBOPLASTIN TIME PARTIAL: CPT | Performed by: NURSE PRACTITIONER

## 2022-11-11 PROCEDURE — 99232 SBSQ HOSP IP/OBS MODERATE 35: CPT | Performed by: INTERNAL MEDICINE

## 2022-11-11 PROCEDURE — 85379 FIBRIN DEGRADATION QUANT: CPT | Performed by: NURSE PRACTITIONER

## 2022-11-11 PROCEDURE — 85384 FIBRINOGEN ACTIVITY: CPT | Performed by: NURSE PRACTITIONER

## 2022-11-11 PROCEDURE — 85362 FIBRIN DEGRADATION PRODUCTS: CPT | Performed by: NURSE PRACTITIONER

## 2022-11-11 PROCEDURE — 86022 PLATELET ANTIBODIES: CPT | Performed by: NURSE PRACTITIONER

## 2022-11-11 PROCEDURE — 85420 FIBRINOLYTIC PLASMINOGEN: CPT | Performed by: NURSE PRACTITIONER

## 2022-11-11 PROCEDURE — 80076 HEPATIC FUNCTION PANEL: CPT | Performed by: NURSE PRACTITIONER

## 2022-11-11 PROCEDURE — 82542 COL CHROMOTOGRAPHY QUAL/QUAN: CPT | Performed by: NURSE PRACTITIONER

## 2022-11-11 PROCEDURE — 99232 SBSQ HOSP IP/OBS MODERATE 35: CPT | Performed by: PODIATRIST

## 2022-11-11 PROCEDURE — 85610 PROTHROMBIN TIME: CPT | Performed by: NURSE PRACTITIONER

## 2022-11-11 PROCEDURE — 82948 REAGENT STRIP/BLOOD GLUCOSE: CPT

## 2022-11-11 PROCEDURE — 85007 BL SMEAR W/DIFF WBC COUNT: CPT | Performed by: NURSE PRACTITIONER

## 2022-11-11 PROCEDURE — 85730 THROMBOPLASTIN TIME PARTIAL: CPT | Performed by: INTERNAL MEDICINE

## 2022-11-11 PROCEDURE — 85300 ANTITHROMBIN III ACTIVITY: CPT | Performed by: NURSE PRACTITIONER

## 2022-11-11 PROCEDURE — 99233 SBSQ HOSP IP/OBS HIGH 50: CPT | Performed by: INTERNAL MEDICINE

## 2022-11-11 PROCEDURE — 99232 SBSQ HOSP IP/OBS MODERATE 35: CPT | Performed by: SURGERY

## 2022-11-11 PROCEDURE — 80048 BASIC METABOLIC PNL TOTAL CA: CPT | Performed by: INTERNAL MEDICINE

## 2022-11-11 RX ORDER — LABETALOL HYDROCHLORIDE 5 MG/ML
5 INJECTION, SOLUTION INTRAVENOUS ONCE
Status: COMPLETED | OUTPATIENT
Start: 2022-11-11 | End: 2022-11-11

## 2022-11-11 RX ORDER — ARGATROBAN 1 MG/ML
0.2 INJECTION INTRAVENOUS
Status: DISCONTINUED | OUTPATIENT
Start: 2022-11-11 | End: 2022-11-11

## 2022-11-11 RX ORDER — ARGATROBAN 1 MG/ML
.1-3 INJECTION INTRAVENOUS
Status: DISCONTINUED | OUTPATIENT
Start: 2022-11-11 | End: 2022-11-16

## 2022-11-11 RX ORDER — FENTANYL CITRATE 50 UG/ML
50 INJECTION, SOLUTION INTRAMUSCULAR; INTRAVENOUS
Status: DISCONTINUED | OUTPATIENT
Start: 2022-11-11 | End: 2022-11-13

## 2022-11-11 RX ORDER — FUROSEMIDE 10 MG/ML
40 INJECTION INTRAMUSCULAR; INTRAVENOUS ONCE
Status: COMPLETED | OUTPATIENT
Start: 2022-11-11 | End: 2022-11-11

## 2022-11-11 RX ORDER — POTASSIUM CHLORIDE 20 MEQ/1
40 TABLET, EXTENDED RELEASE ORAL ONCE
Status: COMPLETED | OUTPATIENT
Start: 2022-11-11 | End: 2022-11-11

## 2022-11-11 RX ORDER — POTASSIUM CHLORIDE 14.9 MG/ML
20 INJECTION INTRAVENOUS ONCE
Status: COMPLETED | OUTPATIENT
Start: 2022-11-11 | End: 2022-11-11

## 2022-11-11 RX ORDER — POTASSIUM CHLORIDE 20 MEQ/1
40 TABLET, EXTENDED RELEASE ORAL ONCE
Status: DISCONTINUED | OUTPATIENT
Start: 2022-11-11 | End: 2022-11-11

## 2022-11-11 RX ORDER — FUROSEMIDE 10 MG/ML
40 INJECTION INTRAMUSCULAR; INTRAVENOUS ONCE
Status: CANCELLED | OUTPATIENT
Start: 2022-11-11

## 2022-11-11 RX ADMIN — FUROSEMIDE 40 MG: 10 INJECTION, SOLUTION INTRAVENOUS at 09:52

## 2022-11-11 RX ADMIN — POTASSIUM CHLORIDE 20 MEQ: 14.9 INJECTION, SOLUTION INTRAVENOUS at 07:35

## 2022-11-11 RX ADMIN — HYDROMORPHONE HYDROCHLORIDE 0.5 MG: 1 INJECTION, SOLUTION INTRAMUSCULAR; INTRAVENOUS; SUBCUTANEOUS at 08:15

## 2022-11-11 RX ADMIN — GABAPENTIN 200 MG: 100 CAPSULE ORAL at 17:07

## 2022-11-11 RX ADMIN — HEPARIN SODIUM 12 UNITS/KG/HR: 10000 INJECTION, SOLUTION INTRAVENOUS at 15:20

## 2022-11-11 RX ADMIN — LABETALOL HYDROCHLORIDE 5 MG: 5 INJECTION, SOLUTION INTRAVENOUS at 00:10

## 2022-11-11 RX ADMIN — POTASSIUM CHLORIDE 40 MEQ: 1500 TABLET, EXTENDED RELEASE ORAL at 07:35

## 2022-11-11 RX ADMIN — INSULIN GLARGINE 25 UNITS: 100 INJECTION, SOLUTION SUBCUTANEOUS at 21:05

## 2022-11-11 RX ADMIN — CARVEDILOL 25 MG: 25 TABLET, FILM COATED ORAL at 07:13

## 2022-11-11 RX ADMIN — PANTOPRAZOLE SODIUM 40 MG: 40 TABLET, DELAYED RELEASE ORAL at 05:47

## 2022-11-11 RX ADMIN — QUETIAPINE FUMARATE 25 MG: 25 TABLET ORAL at 21:05

## 2022-11-11 RX ADMIN — OXYCODONE HYDROCHLORIDE 10 MG: 10 TABLET ORAL at 16:15

## 2022-11-11 RX ADMIN — ACETAMINOPHEN 650 MG: 325 TABLET, FILM COATED ORAL at 14:31

## 2022-11-11 RX ADMIN — GABAPENTIN 200 MG: 100 CAPSULE ORAL at 08:14

## 2022-11-11 RX ADMIN — NYSTATIN 1 APPLICATION.: 100000 POWDER TOPICAL at 08:19

## 2022-11-11 RX ADMIN — HYDROMORPHONE HYDROCHLORIDE 0.5 MG: 1 INJECTION, SOLUTION INTRAMUSCULAR; INTRAVENOUS; SUBCUTANEOUS at 12:58

## 2022-11-11 RX ADMIN — AMLODIPINE BESYLATE 10 MG: 10 TABLET ORAL at 08:15

## 2022-11-11 RX ADMIN — ATORVASTATIN CALCIUM 40 MG: 40 TABLET, FILM COATED ORAL at 15:39

## 2022-11-11 RX ADMIN — OXYCODONE HYDROCHLORIDE 10 MG: 10 TABLET ORAL at 20:16

## 2022-11-11 RX ADMIN — NYSTATIN 1 APPLICATION.: 100000 POWDER TOPICAL at 17:51

## 2022-11-11 RX ADMIN — ARGATROBAN 0.2 MCG/KG/MIN: 50 INJECTION, SOLUTION INTRAVENOUS at 22:20

## 2022-11-11 RX ADMIN — DOXAZOSIN 2 MG: 2 TABLET ORAL at 08:15

## 2022-11-11 RX ADMIN — SERTRALINE HYDROCHLORIDE 150 MG: 100 TABLET ORAL at 08:15

## 2022-11-11 RX ADMIN — HYDROMORPHONE HYDROCHLORIDE 0.5 MG: 1 INJECTION, SOLUTION INTRAMUSCULAR; INTRAVENOUS; SUBCUTANEOUS at 17:07

## 2022-11-11 RX ADMIN — INSULIN LISPRO 2 UNITS: 100 INJECTION, SOLUTION INTRAVENOUS; SUBCUTANEOUS at 15:46

## 2022-11-11 RX ADMIN — OXYCODONE HYDROCHLORIDE 10 MG: 10 TABLET ORAL at 07:53

## 2022-11-11 RX ADMIN — ACETAMINOPHEN 650 MG: 325 TABLET, FILM COATED ORAL at 05:47

## 2022-11-11 RX ADMIN — ACETAMINOPHEN 650 MG: 325 TABLET, FILM COATED ORAL at 21:05

## 2022-11-11 RX ADMIN — CLOPIDOGREL BISULFATE 75 MG: 75 TABLET ORAL at 08:15

## 2022-11-11 RX ADMIN — CARVEDILOL 25 MG: 25 TABLET, FILM COATED ORAL at 15:39

## 2022-11-11 RX ADMIN — HYDRALAZINE HYDROCHLORIDE 10 MG: 20 INJECTION, SOLUTION INTRAMUSCULAR; INTRAVENOUS at 01:56

## 2022-11-11 NOTE — QUICK NOTE
Post Op Check Note - Surgery Resident  Erum Long 65 y o  female MRN: [id number]  Unit/Bed#: ICU 01 Encounter: [id number]    ASSESSMENT:  Erum Long is a 65 y o  female who is status post BLE Agram with R ALLISON stent aspiration thrombectomy / PTA / repeat stent placement, L SFA stent aspiration thrombectomy / PTA / repeat stent placement, L Pop POBA, L AT instillation of tPA    Subjective: No acute complaints    Physical Exam:  GEN: NAD  CV: RRR  Lung: Normal effort  Ab: Soft, NT/ND  Neuro: A+Ox3  Incisions: R groin puncture site CDI    BL AT strong multiphasic signals  BLE mottled but L foot fairly warm, R foot somewhat cool    Blood pressure (!) 193/94, pulse 80, temperature 98 9 °F (37 2 °C), temperature source Oral, resp  rate 18, height 5' 3 75" (1 619 m), weight 116 kg (255 lb 11 7 oz), SpO2 98 %, not currently breastfeeding  ,Body mass index is 44 24 kg/m²        Intake/Output Summary (Last 24 hours) at 11/10/2022 2104  Last data filed at 11/10/2022 1715  Gross per 24 hour   Intake 285 37 ml   Output 3085 ml   Net -2799 63 ml       Invasive Devices  Report    Peripheral Intravenous Line  Duration           Peripheral IV 11/09/22 Left;Proximal;Upper;Ventral (anterior) Arm 1 day    Peripheral IV 11/10/22 Right;Ventral (anterior) Forearm <1 day          Drain  Duration           Urethral Catheter Latex 16 Fr  3 days

## 2022-11-11 NOTE — ASSESSMENT & PLAN NOTE
Lab Results   Component Value Date    HGB 8 7 (L) 11/10/2022    HGB 8 6 (L) 11/09/2022    HGB 8 8 (L) 11/08/2022     Hemoglobin fluctuating with baseline around 10  S/p 1 unit transfused 11/3/2022, H/H improved and has remained stable ever since  FOBT neg, no current source of bleeding, groin without hematoma  Continue to monitor H/H

## 2022-11-11 NOTE — PROGRESS NOTES
Progress Note - Vascular Surgery  : NICK Vascular Surgery  on Kendal Varghese 65 y o  female MRN: [id number]  Unit/Bed#: ICU 01 Encounter: [id number]    Assessment:  65 y o  female POD 1 s/p BLE Agram with R ALLISON stent aspiration thrombectomy / PTA / repeat stent placement, L SFA stent aspiration thrombectomy / PTA / repeat stent placement, L Pop POBA, L AT instillation of tPA      Plan:  Continue heparin / plavix  Appreciate podiatry input  Neurovascular checks  Nutrition  Encourage OOB / ambulate to mitigate deconditioning, LE weight-bearing restrictions per podiatry    Subjective/Objective     Subjective: Very sleepy      Objective:     Pulse exam:  R AT strong multiphasic, foot cool  L DP strong multiphasic, foot warm    BL feet and BLE with some mottling    Physical Exam:  GEN: NAD  HEENT: MMM  CV: RRR  Lung: Normal effort  Ab: Soft, ND/NT   Neuro: A+Ox3         Vitals:  /64   Pulse 78   Temp 98 4 °F (36 9 °C) (Oral)   Resp (!) 31   Ht 5' 3 75" (1 619 m)   Wt 116 kg (255 lb 11 7 oz)   SpO2 96%   BMI 44 24 kg/m²     I/Os:  I/O last 3 completed shifts: In: 1108 8 [I V :858 8; IV Piggyback:250]  Out: 4934 [Urine:4410]  I/O this shift:  In: 346 8 [I V :346 8]  Out: 600 [Urine:600]    Lab Results and Cultures:   Lab Results   Component Value Date    WBC 16 06 (H) 11/10/2022    HGB 8 7 (L) 11/10/2022    HCT 28 1 (L) 11/10/2022    MCV 91 11/10/2022    PLT 60 (L) 11/10/2022     Lab Results   Component Value Date    CALCIUM 7 8 (L) 11/11/2022    K 3 3 (L) 11/11/2022    CO2 28 11/11/2022     11/11/2022    BUN 18 11/11/2022    CREATININE 1 00 11/11/2022     Lab Results   Component Value Date    INR 1 14 11/01/2022    INR 1 03 10/31/2022    INR 1 00 10/21/2022    PROTIME 14 6 (H) 11/01/2022    PROTIME 13 5 10/31/2022    PROTIME 13 2 10/21/2022        Blood Culture:   Lab Results   Component Value Date    BLOODCX No Growth After 5 Days   10/21/2022   ,   Urinalysis:   Lab Results   Component Value Date    COLORU Light Yellow 10/21/2022    CLARITYU Turbid 10/21/2022    SPECGRAV >=1 030 10/21/2022    PHUR 6 0 10/21/2022    PHUR 6 5 02/23/2018    LEUKOCYTESUR Negative 10/21/2022    NITRITE Negative 10/21/2022    GLUCOSEU 250 (1/4%) (A) 10/21/2022    KETONESU Negative 10/21/2022    BILIRUBINUR Negative 10/21/2022    BLOODU Large (A) 10/21/2022   ,   Urine Culture:   Lab Results   Component Value Date    URINECX >100,000 cfu/ml Escherichia coli (A) 10/21/2022   ,   Wound Culure: No results found for: WOUNDCULT    Medications:  Current Facility-Administered Medications   Medication Dose Route Frequency   • acetaminophen (TYLENOL) tablet 650 mg  650 mg Oral Q6H PRN   • acetaminophen (TYLENOL) tablet 650 mg  650 mg Oral Q8H [address]   • amLODIPine (NORVASC) tablet 10 mg  10 mg Oral Daily   • atorvastatin (LIPITOR) tablet 40 mg  40 mg Oral Daily With Dinner   • carvedilol (COREG) tablet 25 mg  25 mg Oral BID With Meals   • clopidogrel (PLAVIX) tablet 75 mg  75 mg Oral Daily   • doxazosin (CARDURA) tablet 2 mg  2 mg Oral Daily   • gabapentin (NEURONTIN) capsule 200 mg  200 mg Oral BID   • heparin (porcine) 25,000 units in 0 45% NaCl 250 mL infusion (premix)  3-30 Units/kg/hr (Order-Specific) Intravenous Titrated   • heparin (porcine) injection 4,400 Units  4,400 Units Intravenous Q1H PRN   • heparin (porcine) injection 8,800 Units  8,800 Units Intravenous Q1H PRN   • hydrALAZINE (APRESOLINE) injection 10 mg  10 mg Intravenous Q6H PRN   • HYDROmorphone (DILAUDID) injection 0 5 mg  0 5 mg Intravenous Q1H PRN   • insulin glargine (LANTUS) subcutaneous injection 25 Units 0 25 mL  25 Units Subcutaneous HS   • insulin lispro (HumaLOG) 100 units/mL subcutaneous injection 2-12 Units  2-12 Units Subcutaneous TID AC   • levalbuterol (XOPENEX) inhalation solution 1 25 mg  1 25 mg Nebulization TID   • LORazepam (ATIVAN) tablet 0 5 mg  0 5 mg Oral Q8H PRN   • metoclopramide (REGLAN) injection 5 mg  5 mg Intravenous Q6H PRN   • naloxone (NARCAN) 0 04 mg/mL syringe 0 04 mg  0 04 mg Intravenous Q1MIN PRN   • nystatin (MYCOSTATIN) powder   Topical BID   • ondansetron (ZOFRAN) injection 4 mg  4 mg Intravenous Q6H PRN   • oxyCODONE (ROXICODONE) immediate release tablet 10 mg  10 mg Oral Q4H PRN   • oxyCODONE (ROXICODONE) IR tablet 5 mg  5 mg Oral Q4H PRN   • pantoprazole (PROTONIX) EC tablet 40 mg  40 mg Oral Early Morning   • QUEtiapine (SEROquel) tablet 25 mg  25 mg Oral HS   • sertraline (ZOLOFT) tablet 150 mg  150 mg Oral Daily         Yohannes Calles MD  11/11/2022

## 2022-11-11 NOTE — PROGRESS NOTES
NEPHROLOGY PROGRESS NOTE   Fina Eubanks 65 y o  female MRN: [id number]  Unit/Bed#: ICU 01 Encounter: [id number]  Reason for Consult: NYDIA    ASSESSMENT AND PLAN:  Mild NYDIA, baseline creatinine 1 0 in early 2022, prior 0 7 to 0 9  -creatinine 1 1 on admission had increased up to 1 4 during hospitalization, now seems to have overall improved  stable 1 0 today   -status post multiple contrast exposure on 10/31, 11/1, 11/7 11/9 and most recent on 11/10   -closely monitor for SANTOS  -Clinically seems volume overloaded, requiring stable 2 L O2 via nasal cannula    -agree with IV Lasix 40 mg once today  -okay to discontinue Jacinto catheter from renal standpoint  -recent UA showed 3+ proteinuria, innumerable RBCs, WBCs, bacteria, very concentrated sample   -would like repeat UA with microscopy once Jacinto catheter is removed      Proteinuria, no recent UPC ratio available, prior urine microalbumin/creatinine ratio 2 7 g in 2017  -suspect in the setting of uncontrolled diabetes, last hemoglobin A1c 9 8 in October 2022   This ranges from 9 to 13 going back to 2019  -on lisinopril 40 mg daily as outpatient, currently remains on hold  Hypokalemia, serum potassium 3 3 below goal   Status post 40 mEq potassium chloride once today      Hypertension  -BP fluctuating and above goal but overall improved comparing to yesterday  -outpatient regimen includes lisinopril 40 mg daily, Toprol-XL 25 mg daily, amlodipine 10 mg daily   -continue to avoid lisinopril  -continue amlodipine 10 mg daily, Coreg 25 mg b i d , doxazosin 2 mg daily    Lasix as above   -goal SBP 130s     Significant PVD, status post lower extremity angiogram, right CFA balloon angioplasty, stent placement, atherectomy  -status post right ALLISON stent thrombosis treated with aspiration thrombectomy, stent placement by IR yesterday   -vascular surgery follow-up     Clinically seems volume overloaded, O2 requirement overall stable, currently remains on 2 L O2 via nasal cannula   -most recent echo shows EF 83%, normal diastolic function  -Lasix IV as above      Discussed above plan in detail with ICU team    SUBJECTIVE:  Patient seen and examined at bedside  Somewhat sleepy at the time of my encounter  Remains on stable O2 via nasal cannula    Complain of lower extremity pain    OBJECTIVE:  Current Weight: Weight - Scale: 116 kg (255 lb 11 7 oz)  Vitals:    11/11/22 0924   BP: 141/64   Pulse: 76   Resp: 21   Temp:    SpO2: 96%       Intake/Output Summary (Last 24 hours) at 11/11/2022 0948  Last data filed at 11/11/2022 0901  Gross per 24 hour   Intake 450 66 ml   Output 3010 ml   Net -2559 34 ml     Wt Readings from Last 3 Encounters:   11/11/22 116 kg (255 lb 11 7 oz)   10/26/22 98 4 kg (216 lb 14 9 oz)   10/21/22 98 4 kg (217 lb)     Temp Readings from Last 3 Encounters:   11/11/22 99 3 °F (37 4 °C) (Oral)   10/21/22 97 7 °F (36 5 °C) (Temporal)   03/22/22 98 6 °F (37 °C)     BP Readings from Last 3 Encounters:   11/11/22 141/64   10/21/22 160/90   04/27/22 138/70     Pulse Readings from Last 3 Encounters:   11/11/22 76   10/21/22 99   03/22/22 92        Physical Examination:  General:  Lying in bed, mild acute distress   Eyes:  Mild conjunctival pallor present  ENT:  External examination of ears and nose unremarkable  Neck:  No obvious lymphadenopathy appreciated  Respiratory:  Bilateral air entry present  CVS:  S1, S2 present  GI:  Soft, nondistended  CNS:  Somewhat sleepy, opens eyes, follows commands  Skin:  Right foot discoloration, clear fluid filled blister noted on right thigh  Musculoskeletal:  No obvious new gross deformity noted    Medications:    Current Facility-Administered Medications:   •  acetaminophen (TYLENOL) tablet 650 mg, 650 mg, Oral, Q6H PRN, Yunior Johnson MD, 650 mg at 11/10/22 2028  •  acetaminophen (TYLENOL) tablet 650 mg, 650 mg, Oral, Q8H [address], Beverli Lot, DO, 650 mg at 11/11/22 0547  •  amLODIPine (NORVASC) tablet 10 mg, 10 mg, Oral, Daily, Staci Fairchild MD, 10 mg at 11/11/22 7253  •  atorvastatin (LIPITOR) tablet 40 mg, 40 mg, Oral, Daily With Mando Espinoza MD, 40 mg at 11/09/22 1623  •  carvedilol (COREG) tablet 25 mg, 25 mg, Oral, BID With Meals, Staci Fairchild MD, 25 mg at 11/11/22 5853  •  clopidogrel (PLAVIX) tablet 75 mg, 75 mg, Oral, Daily, Staci Fairchild MD, 75 mg at 11/11/22 0815  •  doxazosin (CARDURA) tablet 2 mg, 2 mg, Oral, Daily, Cornelio Oviedo MD, 2 mg at 11/11/22 0815  •  furosemide (LASIX) injection 40 mg, 40 mg, Intravenous, Once, Staci Fairchild MD  •  gabapentin (NEURONTIN) capsule 200 mg, 200 mg, Oral, BID, Staci Fairchild MD, 200 mg at 11/11/22 4061  •  heparin (porcine) 25,000 units in 0 45% NaCl 250 mL infusion (premix), 3-30 Units/kg/hr (Order-Specific), Intravenous, Titrated, Staci Fairchild MD, Last Rate: 13 2 mL/hr at 11/11/22 0830, 12 Units/kg/hr at 11/11/22 0830  •  heparin (porcine) injection 4,400 Units, 4,400 Units, Intravenous, Q1H PRN, Staci Fairchild MD, 4,400 Units at 11/08/22 2140  •  heparin (porcine) injection 8,800 Units, 8,800 Units, Intravenous, Q1H PRN, Staci Fairchild MD, 3,149 Units at 11/07/22 2152  •  hydrALAZINE (APRESOLINE) injection 10 mg, 10 mg, Intravenous, Q6H PRN, Staci Fairchild MD, 10 mg at 11/11/22 0156  •  HYDROmorphone (DILAUDID) injection 0 5 mg, 0 5 mg, Intravenous, Q1H PRN, Yoko Burr DO, 0 5 mg at 11/11/22 0815  •  insulin glargine (LANTUS) subcutaneous injection 25 Units 0 25 mL, 25 Units, Subcutaneous, HS, Staci Fairchild MD, 25 Units at 11/10/22 2150  •  insulin lispro (HumaLOG) 100 units/mL subcutaneous injection 2-12 Units, 2-12 Units, Subcutaneous, TID AC, 2 Units at 11/10/22 1713 **AND** Fingerstick Glucose (POCT), , , TID AC, Staci Fairchild MD  •  LORazepam (ATIVAN) tablet 0 5 mg, 0 5 mg, Oral, Q8H PRN, Staci Fairchild MD, 0 5 mg at 11/10/22 2151  • metoclopramide (REGLAN) injection 5 mg, 5 mg, Intravenous, Q6H PRN, Ranjan Russ MD, 5 mg at 10/23/22 7149  •  naloxone (NARCAN) 0 04 mg/mL syringe 0 04 mg, 0 04 mg, Intravenous, Q1MIN PRN, Warren Blow, DO  •  nystatin (MYCOSTATIN) powder, , Topical, BID, Ranjan Russ MD, 1 application at 52/54/07 9912  •  ondansetron [facility]) injection 4 mg, 4 mg, Intravenous, Q6H PRN, Ranjan Russ MD, 4 mg at 11/08/22 1336  •  oxyCODONE (ROXICODONE) immediate release tablet 10 mg, 10 mg, Oral, Q4H PRN, Warren Blow, DO, 10 mg at 11/11/22 1630  •  oxyCODONE (ROXICODONE) IR tablet 5 mg, 5 mg, Oral, Q4H PRN, Quinn Blow, DO, 5 mg at 11/10/22 2151  •  pantoprazole (PROTONIX) EC tablet 40 mg, 40 mg, Oral, Early Morning, Ranjan Russ MD, 40 mg at 11/11/22 0547  •  QUEtiapine (SEROquel) tablet 25 mg, 25 mg, Oral, HS, [facility], DO, 25 mg at 11/10/22 2151  •  sertraline (ZOLOFT) tablet 150 mg, 150 mg, Oral, Daily, Quinn Yañezw, DO, 150 mg at 11/11/22 0815    Laboratory Results:  Results from last 7 days   Lab Units 11/11/22  0435 11/10/22  0410 11/09/22  0439 11/08/22  0532 11/07/22  1520 11/07/22  0437 11/06/22  0458   WBC Thousand/uL 15 69* 16 06* 14 83* 16 57* 13 88* 8 74 8 87   HEMOGLOBIN g/dL 8 2* 8 7* 8 6* 8 8* 8 7* 7 0* 7 8*   HEMATOCRIT % 26 4* 28 1* 28 2* 28 7* 27 2* 22 8* 25 7*   PLATELETS Thousands/uL 40* 60* 83* 105* 157 266 266   SODIUM mmol/L 139 140 141 139 140 137 138   POTASSIUM mmol/L 3 3* 3 5 3 5 3 6 4 3 3 8 3 8   CHLORIDE mmol/L 103 103 105 104 104 100 99   CO2 mmol/L 28 28 26 24 24 28 30   BUN mg/dL 18 15 17 19 19 19 19   CREATININE mg/dL 1 00 0 97 1 02 1 20 1 21 1 40* 1 37*   CALCIUM mg/dL 7 8* 8 0* 7 8* 7 4* 7 5* 8 2* 8 6   MAGNESIUM mg/dL  --   --   --   --  1 7  --   --    PHOSPHORUS mg/dL  --   --   --   --  5 6*  --   --        XR chest portable   Final Result by Mitchell Henderson MD (11/10 5978)      Mild pulmonary venous congestion  Question trace right effusion  Workstation performed: [id number]         CTA ABDOMEN W RUN OFF W WO CONTRAST   Final Result by Esteban Gerber MD (11/09 1705)      1  Suspect interval occlusion of recently placed proximal right common iliac artery stent   2  No residual filling defect or dissection involving right common femoral artery  Diffusely small diseased right SFA and popliteal artery without focal flow-limiting stenosis identified  Patent recently placed SFA stent  3   Intact anterior tibial artery with stenotic and/or occluded dorsalis pedis artery  Posterior tibial artery occludes at the ankle  Patent peroneal artery without opacification of anterior and posterior communicating arteries  4   Near circumferentially calcified stenosis of proximal left common iliac artery  The lesion measures 30-40% in severity by reconstructions which are compromised by artifact and software limitations and therefore difficult to exclude a focal    flow-limiting stenosis within this segment  5   No significant left common femoral stenosis  SFA is diffusely diseased  Newly placed proximal stent is patent but distal SFA stent is occluded  Popliteal artery is diffusely stenotic  6   Left popliteal artery is diffusely stenotic which seems new from the prior arteriogram and may be related to acute stent occlusion  Left anterior tibial artery is intact  Posterior tibial artery occludes at the calcaneus  7   Small bilateral pleural effusions   8  Small volume ascites new from prior CT with diffuse subcutaneous edema            Workstation performed: [id number]         VAS lower limb arterial duplex, complete bilateral   Final Result by Reg Majano MD (11/09 1242)      XR chest portable ICU   Final Result by Josue Herrera DO (11/09 1612)      Moderate pulmonary edema, similar to the prior examination  No large effusions                    Workstation performed: [id number]         VAS lower limb vein mapping bypass graft   Final Result by Victoria Drummond MD (11/03 1289)      CTA ABDOMEN W RUN OFF W WO CONTRAST   Final Result by Valentino Barn, MD (11/02 5203)   Vascular:   Left lower extremity:   Interval stenting and lithotripsy of the left superficial femoral artery  Right lower extremity:   1  Interval development of a probable flow-limiting dissection flap at the prior arteriotomy site in the right common femoral artery superimposed upon moderate to severe atherosclerotic disease  2   Focal severe atherosclerotic narrowing at the adductor canal with additional moderate atherosclerotic narrowing throughout the remainder of the right superficial femoral artery  The study was marked in EPIC for significant notification  Workstation performed: [id number]         VAS lower limb arterial duplex, limited, unilateral   Final Result by Victoria Drummond MD (11/02 1658)      VAS SHARIF & waveform analysis, multiple levels   Final Result by Victoria Drummond MD (11/01 3468)      XR chest portable   Final Result by Rock Jeffrey MD (10/31 4908)      Pulmonary edema has increased since October 26, 2022  Workstation performed: [id number]         IR aortagram with run-off   Final Result by Claus Chavarria MD (11/01 6236)      Left lower leg arteriogram with proximal and distal SFA shockwave balloon lithotripsy and stent placement, as well as right common iliac artery stent placement  PLAN:      Follow-up arterial duplex  Patient became short of breath after the procedure, with increased O2 requirement  Etiology most likely volume overload secondary to renal prep as well as contrast and fluid was given during the procedure where she was lying flat  Dr Batsheva Blood from the    primary team made aware  Chest x-ray to be obtained upon arrival to the floor           Workstation performed: [id number]         MRI follow up [facility]   Final Result by Kriss Pike MD (10/28 1614)      Nondiagnostic study  Workstation performed: [id number]         CTA ABDOMEN W RUN OFF W WO CONTRAST   Final Result by Sherry Lazar MD (10/28 4609)      1  Moderate stenosis of the mid infrarenal abdominal aorta secondary to calcific atherosclerotic disease  2  LEFT: Up to 50% stenosis of the proximal common iliac artery  The proximal and distal 3rd of the superficial femoral artery demonstrates 50-75% stenosis  Popliteal artery demonstrates greater than 75% stenosis in the P2 segment  Patent    three-vessel runoff extending into the left foot  3  RIGHT: Proximal common iliac artery stenosis of up to 50%  50% stenosis of the common femoral artery  Superficial femoral artery demonstrates up to 50% stenosis within the proximal several centimeters, 50-75% stenosis throughout the distal 3rd of    the vessel  Popliteal artery demonstrates scattered stenoses of 50-75%  Patent three-vessel runoff extending into the right foot  Workstation performed: [id number]         MRI ankle/heel left  wo contrast   Final Result by Elias Smith MD (10/27 1949)      Exam moderately limited due to patient motion  There is a large plantar lateral heel ulcer, without underlying soft tissue abscess (series 5 image 5-15 )      Associated with this ulcer, there is only very minimal reactive marrow edema in the calcaneal tubercle evident on T2-weighted sequences (series 3 image 2, series 7 image 8 ) No confluent T1-weighted marrow abnormality or cortical destruction to suggest    osteomyelitis  Workstation performed: [id number]         XR chest portable   Final Result by Melly Meier MD (10/27 2984)      Mild pulmonary vascular congestion                    Workstation performed: [id number]         VAS lower limb arterial duplex, complete bilateral   Final Result by Rina Cardenas MD (10/24 1400)      CT lower extremity w contrast left   Final Result by Gerald Call DO (10/22 0102)      No acute bony process is seen  Moderate superficial soft tissue edema in the foot, ankle, and leg, superimposed cellulitis considered in the appropriate clinical setting  Correlation with patient's symptoms and laboratory values recommended  No discrete drainable collection    identified  Other findings as above  Workstation performed: [id number]         XR chest 1 view portable   ED Interpretation by Tamir Bojorquez PA-C (10/21 2203)   ED wet read:  Possible mild vascular congestion  Final Result by Sudarshan Bay MD (46/57 1213)      No acute cardiopulmonary disease  Workstation performed: [id number]         XR foot 3+ views RIGHT   Final Result by Sudarshan Bay MD (30/40 9699)      No acute osseous abnormality  Workstation performed: [id number]         IR lower extremity angiogram    (Results Pending)   IR lower extremity angiogram    (Results Pending)       Portions of the record may have been created with voice recognition software  Occasional wrong word or "sound a like" substitutions may have occurred due to the inherent limitations of voice recognition software  Read the chart carefully and recognize, using context, where substitutions have occurred

## 2022-11-11 NOTE — ASSESSMENT & PLAN NOTE
Patient has uncontrolled diabetes, hypertension, hyperlipidemia, patient was lost to follow-up and has not seen endocrinologist for over a year  Admits that she is poorly compliant with medication  Admitted due to Worsening lower extremity wounds  · CTA abd/pelvis:   § 2   LEFT: Up to 50% stenosis of the proximal common iliac artery   The proximal and distal 3rd of the superficial femoral artery demonstrates 50-75% stenosis   Popliteal artery demonstrates greater than 75% stenosis in the P2 segment   Patent   three-vessel runoff extending into the left foot  § 3  RIGHT: Proximal common iliac artery stenosis of up to 50%   50% stenosis of the common femoral artery   Superficial femoral artery demonstrates up to 50% stenosis within the proximal several centimeters, 50-75% stenosis throughout the distal 3rd of   the vessel   Popliteal artery demonstrates scattered stenoses of 50-75%   Patent three-vessel runoff extending into the right foot    S/p abdominal aortogram,  LLE SFA shockwave angioplasty/FATOU 10/31/22   Post procedure  There was evidence of improvement in SHARIF in the left however on the right, SHARIF was noted to decrease to 0 51 (0 80)  CTA abd/pelvis w/ run off 11/1/22: Flow-limiting dissection flap at the prior arteriotomy site in the R CFA superimposed upon moderate to severe atherosclerotic disease  Focal severe atherosclerotic narrowing at the adductor canal with additional moderate atherosclerotic narrowing throughout the remainder of the R SFA    S/p R femoral endarterectomy 11/7  Severe pain post procedure  CTA 11/9 shows Suspect interval occlusion of recently placed proximal right common iliac artery stent  11/10 s/p aspiration thrombectomy in right common iliac artery and Left SFA  S/p Thrombosed distal left SFA stent treated with aspiration thrombectomy  For monitoring in the ICU  Will monitor post procedure in the ICU  Continue heparin drip  Continue atorvastatin 40 mg once daily, clopidogrel 45 mg once daily  Vascular is the primary

## 2022-11-11 NOTE — ASSESSMENT & PLAN NOTE
Lab Results   Component Value Date    WBC 16 06 (H) 11/10/2022    WBC 14 83 (H) 11/09/2022    WBC 16 57 (H) 11/08/2022     Echo on 11/07 shows 12% LVEF, no diastolic dysfunction  After the procedure patient was noted to have desaturated to 85% SpO2, and needed 5 L via face mask to saturate greater than 90%  Likely present from postprocedure   Weaned down to 3 L saturating 95%     Continue to wean off

## 2022-11-11 NOTE — ASSESSMENT & PLAN NOTE
Vitals:    11/11/22 0400 11/11/22 0500 11/11/22 0554 11/11/22 0600   BP: 160/66 150/64  (!) 176/87   BP Location: Left arm      Pulse: 78 78  78   Resp: (!) 32 (!) 31  (!) 29   Temp: 98 4 °F (36 9 °C)      TempSrc: Oral      SpO2: 95% 96%  95%   Weight:   116 kg (255 lb 11 7 oz)    Height:       Patient's blood pressure has been running high in the 180s, 190s SBP  Patient's home dose of amlodipine 10 mg, lisinopril 40 mg, metoprolol succinate 25 mg daily  Lisinopril was held due to NYDIA  Plan:  Continue amlodipine 10 mg once daily, continue Coreg 25 mg b i d  Doxazosin 2 mg daily  Continue hydralazine 10 mg every 6 hours p r n   For SBP greater than 180  Hold off home lisinopril per nephrology due to NYDIA

## 2022-11-11 NOTE — ASSESSMENT & PLAN NOTE
Podiatry on board with recommendations  below appreciated  · No immediate plan for podiatric surgical intervention  Dressings changed, no clinical sign of acute infection B/L  · Continue local wound care, betadine DSD  Appreciate nursing assistance with dressing changes  · Elevation on green foam wedges or pillows when non-ambulatory    · Rest of care per primary team

## 2022-11-11 NOTE — ASSESSMENT & PLAN NOTE
Lab Results   Component Value Date    HGBA1C 9 8 (H) 10/25/2022       Recent Labs     11/10/22  0746 11/10/22  1121 11/10/22  1710 11/10/22  2149   POCGLU 125 155* 153* 173*       Blood Sugar Average: Last 72 hrs:  (P) 337 [id number]   Patient's home medication 60 units glargine at bedtime, Humalog 20 units t i d  Continue glargine 25 unitis    Will increase glargine to 25 units  Currently still NPO per vascular

## 2022-11-11 NOTE — PROGRESS NOTES
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  2  Ischemic RLE with epidermolysis, mottling of skin  3  DM neuropathy with severe PAD    Plan:  1  RLE is still cool to touch, foot is unlikely to be salvageable  Reviewed vascular note, Agree with allowing to demarcate, unfortunately she will ultimately need major limb amputation  2  Left heel eschar dry, stable  3  No current plan for podiatry intervention  Wounds orders placed in chart and discussed with nursing  Discussed with ICU team    4  Patient is very high risk for muscle loss, rhabdo, necrotizing fascitis  Monitor closely both through bloodwork/clinical exam  Low threshold for guillotine amputation if she shows any evidence of nec fascitis  Subjective/Objective   Chief Complaint:   Chief Complaint   Patient presents with   • Wound Check     Referred to ed by pcp for wound on left heel  Pt reports vomiting  Denies fever  Hx of diabetes  Not checking blood glucose at home  Not taking medications as prescribed  Subjective: 65 y o  y/o female seen and evaluated at bedside  PAtient is clearly in pain      Blood pressure 136/54, pulse 74, temperature 99 3 °F (37 4 °C), temperature source Oral, resp  rate 22, height 5' 3 75" (1 619 m), weight 116 kg (255 lb 11 7 oz), SpO2 95 %, not currently breastfeeding  ,Body mass index is 44 24 kg/m²      Lab Results   Component Value Date    WBC 15 69 (H) 11/11/2022    HGB 8 2 (L) 11/11/2022    HCT 26 4 (L) 11/11/2022    MCV 90 11/11/2022    PLT 40 (LL) 11/11/2022     Lab Results   Component Value Date    CALCIUM 7 8 (L) 11/11/2022    K 3 3 (L) 11/11/2022    CO2 28 11/11/2022     11/11/2022    BUN 18 11/11/2022    CREATININE 1 00 11/11/2022         Invasive Devices  Report    Peripheral Intravenous Line  Duration           Peripheral IV 11/09/22 Left;Proximal;Upper;Ventral (anterior) Arm 1 day    Peripheral IV 11/10/22 Right;Ventral (anterior) Forearm 1 day          Drain  Duration           Urethral Catheter Latex 16 Fr  4 days                Physical Exam:   General: she is awake, appears in pain  Vascular: nonpalpable pedal pulses  The right foot and ankle are mottled, cold to touch  She has epidermal bulla forming up the entire RLE and abdomen  No crepitus  Dermatology: black eschar left heel, no malodor or drainage, no cellultiis  The skin on the RLE is mottled and cold to touch  Neurological: gross sensation intact  MSK: Severe RLE pain, swelling  Lab, Imaging and other studies:                           Imaging: I have personally reviewed pertinent films in PACS          Portions of the record may have been created with voice recognition software  Occasional wrong word or "sound a like" substitutions may have occurred due to the inherent limitations of voice recognition software  Read the chart carefully and recognize, using context, where substitutions have occurred

## 2022-11-11 NOTE — DISCHARGE INSTR - OTHER ORDERS
Wound Care Plan:   1-Low air-loss mattress  2-Elevate right heel/leg off of bed/chair surface to offload pressure  3-Turn/reposition every 2 hours while in bed using positioning wedges for pressure re-distribution on skin  4-Left AKA and right foot--per podiatry and vascular team   5-Left groin puncture site--cleanse with normal saline, pat dry  Apply Maxorb Ag and dry dressing (lay into fold)  Change dressing daily and as needed with soilage  6-Right thigh/leg--cleanse with normal saline, pat dry  Apply Xeroform to medial/lateral thigh eschars, and adaptic to lower leg open areas/blisters  Cover with ABDs and wrap with rolled gauze  7-Apply Allevyn Life foam dressing to midline sacrum (small sacral) for prevention  Change dressing every 3 days and as needed with soilage

## 2022-11-11 NOTE — PLAN OF CARE
Problem: Potential for Falls  Goal: Patient will remain free of falls  Description: INTERVENTIONS:  - Educate patient/family on patient safety including physical limitations  - Instruct patient to call for assistance with activity   - Consult OT/PT to assist with strengthening/mobility   - Keep Call bell within reach  - Keep bed low and locked with side rails adjusted as appropriate  - Keep care items and personal belongings within reach  - Initiate and maintain comfort rounds  - Make Fall Risk Sign visible to staff  - Offer Toileting every 2 Hours, in advance of need  - Initiate/Maintain bed  chair alarm  - Obtain necessary fall risk management equipment: bed chair alarm, call bell, gripper sock, walker, bedside commode  - Apply yellow socks and bracelet for high fall risk patients  - Consider moving patient to room near nurses station  Outcome: Progressing     Problem: MOBILITY - ADULT  Goal: Maintain or return to baseline ADL function  Description: INTERVENTIONS:  -  Assess patient's ability to carry out ADLs; assess patient's baseline for ADL function and identify physical deficits which impact ability to perform ADLs (bathing, care of mouth/teeth, toileting, grooming, dressing, etc )  - Assess/evaluate cause of self-care deficits   - Assess range of motion  - Assess patient's mobility; develop plan if impaired  - Assess patient's need for assistive devices and provide as appropriate  - Encourage maximum independence but intervene and supervise when necessary  - Involve family in performance of ADLs  - Assess for home care needs following discharge   - Consider OT consult to assist with ADL evaluation and planning for discharge  - Provide patient education as appropriate  Outcome: Progressing  Goal: Maintains/Returns to pre admission functional level  Description: INTERVENTIONS:  - Perform BMAT or MOVE assessment daily    - Set and communicate daily mobility goal to care team and patient/family/caregiver     - Collaborate with rehabilitation services on mobility goals if consulted  - Assist patient in repositioning every 2 hours    - Dangle patient 3 times a day  - Stand patient 3 times a day  - Out of bed to chair as tolerated  - Out of bed for meals  - Out of bed for toileting  - Record patient progress and toleration of activity level   Outcome: Progressing     Problem: PAIN - ADULT  Goal: Verbalizes/displays adequate comfort level or baseline comfort level  Description: Interventions:  - Encourage patient to monitor pain and request assistance  - Assess pain using appropriate pain scale  - Administer analgesics based on type and severity of pain and evaluate response  - Implement non-pharmacological measures as appropriate and evaluate response  - Consider cultural and social influences on pain and pain management  - Notify physician/advanced practitioner if interventions unsuccessful or patient reports new pain  Outcome: Progressing     Problem: INFECTION - ADULT  Goal: Absence or prevention of progression during hospitalization  Description: INTERVENTIONS:  - Assess and monitor for signs and symptoms of infection  - Monitor lab/diagnostic results  - Monitor all insertion sites, i e  indwelling lines, tubes, and drains  - Administer medications as ordered  - Instruct and encourage patient and family to use good hand hygiene technique  Outcome: Progressing     Problem: SAFETY ADULT  Goal: Patient will remain free of falls  Description: INTERVENTIONS:  - Educate patient/family on patient safety including physical limitations  - Instruct patient to call for assistance with activity   - Consult OT/PT to assist with strengthening/mobility   - Keep Call bell within reach  - Keep bed low and locked with side rails adjusted as appropriate  - Keep care items and personal belongings within reach  - Initiate and maintain comfort rounds  - Make Fall Risk Sign visible to staff  - Offer Toileting every 2 Hours, in advance of need  - Initiate/Maintain bed  chair alarm  - Obtain necessary fall risk management equipment: bed chair alarm, call bell, gripper sock, walker, bedside commode  - Apply yellow socks and bracelet for high fall risk patients  - Consider moving patient to room near nurses station  Outcome: Progressing  Goal: Maintain or return to baseline ADL function  Description: INTERVENTIONS:  -  Assess patient's ability to carry out ADLs; assess patient's baseline for ADL function and identify physical deficits which impact ability to perform ADLs (bathing, care of mouth/teeth, toileting, grooming, dressing, etc )  - Assess/evaluate cause of self-care deficits   - Assess range of motion  - Assess patient's mobility; develop plan if impaired  - Assess patient's need for assistive devices and provide as appropriate  - Encourage maximum independence but intervene and supervise when necessary  - Involve family in performance of ADLs  - Assess for home care needs following discharge   - Consider OT consult to assist with ADL evaluation and planning for discharge  - Provide patient education as appropriate  Outcome: Progressing  Goal: Maintains/Returns to pre admission functional level  Description: INTERVENTIONS:  - Perform BMAT or MOVE assessment daily    - Set and communicate daily mobility goal to care team and patient/family/caregiver  - Collaborate with rehabilitation services on mobility goals if consulted  - Assist patient in repositioning every 2 hours    - Dangle patient 3 times a day  - Stand patient 3 times a day  - Out of bed to chair as tolerated  - Out of bed for meals  - Out of bed for toileting  - Record patient progress and toleration of activity level   Outcome: Progressing     Problem: DISCHARGE PLANNING  Goal: Discharge to home or other facility with appropriate resources  Description: INTERVENTIONS:  - Identify barriers to discharge w/patient   - Arrange for needed discharge resources and transportation as appropriate  - Identify discharge learning needs  - Refer to Case Management Department for coordinating discharge planning if the patient needs post-hospital services based on physician/advanced practitioner order   Outcome: Progressing     Problem: Knowledge Deficit  Goal: Patient/family/caregiver demonstrates understanding of disease process, treatment plan, medications, and discharge instructions  Description: Complete learning assessment and assess knowledge base    Interventions:  - Provide teaching at level of understanding  - Provide teaching via preferred learning methods  Outcome: Progressing     Problem: METABOLIC, FLUID AND ELECTROLYTES - ADULT  Goal: Electrolytes maintained within normal limits  Description: INTERVENTIONS:  - Monitor labs and assess patient for signs and symptoms of electrolyte imbalances  - Administer electrolyte replacement as ordered  - Monitor response to electrolyte replacements, including repeat lab results as appropriate  - Instruct patient on fluid and nutrition as appropriate  Outcome: Progressing  Goal: Fluid balance maintained  Description: INTERVENTIONS:  - Monitor labs   - Monitor I/O and WT  - Instruct patient on fluid and nutrition as appropriate  - Assess for signs & symptoms of volume excess or deficit  Outcome: Progressing  Goal: Glucose maintained within target range  Description: INTERVENTIONS:  - Monitor Blood Glucose as ordered  - Assess for signs and symptoms of hyperglycemia and hypoglycemia  - Administer ordered medications to maintain glucose within target range  - Assess nutritional intake and initiate nutrition service referral as needed  Outcome: Progressing     Problem: SKIN/TISSUE INTEGRITY - ADULT  Goal: Skin Integrity remains intact(Skin Breakdown Prevention)  Description: Assess:  -Perform Nicanor assessment every shift  -Clean and moisturize skin every shift  -Inspect skin when repositioning, toileting, and assisting with ADLS  -Assess under medical devices   -Assess extremities for adequate circulation and sensation     Bed Management:  -Have minimal linens on bed & keep smooth, unwrinkled  -Change linens as needed when moist or perspiring  -Avoid sitting or lying in one position for more than 2 hours while in bed    Toileting:  -Offer bedside commode  -Assess for incontinence every 2 hours  -Use incontinent care products after each incontinent episode     Activity:  -Mobilize patient 3 times a day  -Encourage or provide ROM exercises   -Turn and reposition patient every 2 Hours  -Use appropriate equipment to lift or move patient in bed  -Instruct/ Assist with weight shifting every 15 minutes when out of bed in chair  -Consider limitation of chair time 1 hour intervals    Skin Care:  -Avoid use of baby powder, tape, friction and shearing, hot water or constrictive clothing  -Relieve pressure over bony prominences using waffle cushion when in chair  -Do not massage red bony areas    Outcome: Progressing  Goal: Incision(s), wounds(s) or drain site(s) healing without S/S of infection  Description: INTERVENTIONS  - Assess and document dressing, incision, wound bed, drain sites and surrounding tissue  - Provide patient and family education  - Perform skin care/dressing changes everyday as ordered  Outcome: Progressing  Goal: Pressure injury heals and does not worsen  Description: Interventions:  - Implement low air loss mattress or specialty surface (Criteria met)  - Apply silicone foam dressing  - Instruct/assist with weight shifting every 15 minutes when in chair   - Limit chair time to 1 hour intervals  - Use special pressure reducing interventions such as waffle cushion when in chair   - Perform passive or active ROM   - Turn and reposition patient & offload bony prominences every 2 hours   - Utilize friction reducing device or surface for transfers   - Consider consults to  interdisciplinary teams such as wound care, PT/OT  - Use incontinent care products after each incontinent episode   - Consider nutrition services referral as needed  Outcome: Progressing     Problem: CARDIOVASCULAR - ADULT  Goal: Maintains optimal cardiac output and hemodynamic stability  Description: INTERVENTIONS:  - Monitor I/O, vital signs and rhythm  - Monitor for S/S and trends of decreased cardiac output  - Administer and titrate ordered vasoactive medications to optimize hemodynamic stability  - Assess quality of pulses, skin color and temperature  - Assess for signs of decreased coronary artery perfusion  - Instruct patient to report change in severity of symptoms  Outcome: Progressing     Problem: RESPIRATORY - ADULT  Goal: Achieves optimal ventilation and oxygenation  Description: INTERVENTIONS:  - Assess for changes in respiratory status  - Assess for changes in mentation and behavior  - Position to facilitate oxygenation and minimize respiratory effort  - Oxygen administered by appropriate delivery if ordered  - Initiate smoking cessation education as indicated  - Encourage broncho-pulmonary hygiene including cough, deep breathe, Incentive Spirometry  - Assess the need for suctioning and aspirate as needed  - Assess and instruct to report SOB or any respiratory difficulty  - Respiratory Therapy support as indicated  Outcome: Progressing

## 2022-11-11 NOTE — PROGRESS NOTES
[address] [facility]  Progress Note - [address] 1960, 65 y o  female MRN: [id number]  Unit/Bed#: ICU 01 Encounter: [id number]  Primary Care Provider: ALEX Bryant   Date and time admitted to hospital: 10/21/2022  7:58 PM    * PAD (peripheral artery disease) [facility])  Assessment & Plan  Patient has uncontrolled diabetes, hypertension, hyperlipidemia, patient was lost to follow-up and has not seen endocrinologist for over a year  Admits that she is poorly compliant with medication  Admitted due to Worsening lower extremity wounds  · CTA abd/pelvis:   § 2   LEFT: Up to 50% stenosis of the proximal common iliac artery   The proximal and distal 3rd of the superficial femoral artery demonstrates 50-75% stenosis   Popliteal artery demonstrates greater than 75% stenosis in the P2 segment   Patent   three-vessel runoff extending into the left foot  § 3  RIGHT: Proximal common iliac artery stenosis of up to 50%   50% stenosis of the common femoral artery   Superficial femoral artery demonstrates up to 50% stenosis within the proximal several centimeters, 50-75% stenosis throughout the distal 3rd of   the vessel   Popliteal artery demonstrates scattered stenoses of 50-75%   Patent three-vessel runoff extending into the right foot    S/p abdominal aortogram,  LLE SFA shockwave angioplasty/FATOU 10/31/22   Post procedure  There was evidence of improvement in SHARIF in the left however on the right, SHARIF was noted to decrease to 0 51 (0 80)  CTA abd/pelvis w/ run off 11/1/22: Flow-limiting dissection flap at the prior arteriotomy site in the R CFA superimposed upon moderate to severe atherosclerotic disease  Focal severe atherosclerotic narrowing at the adductor canal with additional moderate atherosclerotic narrowing throughout the remainder of the R SFA    S/p R femoral endarterectomy 11/7  Severe pain post procedure  CTA 11/9 shows Suspect interval occlusion of recently placed proximal right common iliac artery stent  11/10 s/p aspiration thrombectomy in right common iliac artery and Left SFA  S/p Thrombosed distal left SFA stent treated with aspiration thrombectomy  For monitoring in the ICU  Will monitor post procedure in the ICU  Continue heparin drip  Continue atorvastatin 40 mg once daily, clopidogrel 45 mg once daily  Vascular is the primary    Diabetic ulcer of heel associated with diabetes mellitus due to underlying condition [facility])  Assessment & Plan    Podiatry on board with recommendations  below appreciated  · No immediate plan for podiatric surgical intervention  Dressings changed, no clinical sign of acute infection B/L  · Continue local wound care, betadine DSD  Appreciate nursing assistance with dressing changes  · Elevation on green foam wedges or pillows when non-ambulatory  · Rest of care per primary team        Acute respiratory failure with hypoxia [facility])  Assessment & Plan  Lab Results   Component Value Date    WBC 16 06 (H) 11/10/2022    WBC 14 83 (H) 11/09/2022    WBC 16 57 (H) 11/08/2022     Echo on 11/07 shows 53% LVEF, no diastolic dysfunction  After the procedure patient was noted to have desaturated to 85% SpO2, and needed 5 L via face mask to saturate greater than 90%  Likely present from postprocedure   Weaned down to 3 L saturating 95%     Continue to wean off      Acute on chronic anemia  Assessment & Plan  Lab Results   Component Value Date    HGB 8 7 (L) 11/10/2022    HGB 8 6 (L) 11/09/2022    HGB 8 8 (L) 11/08/2022     Hemoglobin fluctuating with baseline around 10  S/p 1 unit transfused 11/3/2022, H/H improved and has remained stable ever since  FOBT neg, no current source of bleeding, groin without hematoma  Continue to monitor H/H    Type 2 diabetes mellitus with hyperglycemia, with long-term current use of insulin [facility])  Assessment & Plan  Lab Results   Component Value Date    HGBA1C 9 8 (H) 10/25/2022       Recent Labs     11/10/22  0746 11/10/22  1121 11/10/22  1710 11/10/22  2149   POCGLU 125 155* 153* 173*       Blood Sugar Average: Last 72 hrs:  (P) 253 [id number]   Patient's home medication 60 units glargine at bedtime, Humalog 20 units t i d  Continue glargine 25 unitis  Will increase glargine to 25 units  Currently still NPO per vascular    Benign essential hypertension  Assessment & Plan  Vitals:    11/11/22 0400 11/11/22 0500 11/11/22 0554 11/11/22 0600   BP: 160/66 150/64  (!) 176/87   BP Location: Left arm      Pulse: 78 78  78   Resp: (!) 32 (!) 31  (!) 29   Temp: 98 4 °F (36 9 °C)      TempSrc: Oral      SpO2: 95% 96%  95%   Weight:   116 kg (255 lb 11 7 oz)    Height:       Patient's blood pressure has been running high in the 180s, 190s SBP  Patient's home dose of amlodipine 10 mg, lisinopril 40 mg, metoprolol succinate 25 mg daily  Lisinopril was held due to NYDIA  Plan:  Continue amlodipine 10 mg once daily, continue Coreg 25 mg b i d  Doxazosin 2 mg daily  Continue hydralazine 10 mg every 6 hours p r n  For SBP greater than 180  Hold off home lisinopril per nephrology due to NYDIA      Depression, recurrent [facility])  Assessment & Plan  ·  Seen by Psychiatry during this admission  Unspecified mood disorder with psychosis; rule out mood disorder with psychosis secondary to other physiological condition; rule out major depression with psychotic features  Plan   Recommend increasing Zoloft to 150 mg p o  daily for depression and anxiety  Seroquel 25 mg p o  q h s  as antidepressant adjunct as well as for complaints of insomnia, anxiety and hallucinations         ----------------------------------------------------------------------------------------  HPI/24hr events:  No overnight events    Patient appropriate for transfer out of the ICU today?: Patient does not meet criteria for ICU Follow-up Clinic; referral has not been made     Disposition: Per vascular surgery  Code Status: Level 1 - Full Code  ---------------------------------------------------------------------------------------  SUBJECTIVE  Patient is doing well today  Notes improvement of pain bilateral lower extremities  Still has pain over the heels  No shortness of breath, no chest pain, no abdominal pain  No other subjective complaints noted    Review of Systems  Review of systems was reviewed and negative unless stated above in HPI/24-hour events   ---------------------------------------------------------------------------------------  OBJECTIVE    Vitals   Vitals:    22 0500 22 0554 22 0600 22 0700   BP: 150/64  (!) 176/87 (!) 174/73   BP Location:       Pulse: 78  78 82   Resp: (!) 31  (!) 29 (!) 34   Temp:       TempSrc:       SpO2: 96%  95% 96%   Weight:  116 kg (255 lb 11 7 oz)     Height:         Temp (24hrs), Av 6 °F (37 °C), Min:98 4 °F (36 9 °C), Max:98 9 °F (37 2 °C)  Current: Temperature: 98 4 °F (36 9 °C)  Arterial Line BP: 112/72  Arterial Line MAP (mmHg): 92 mmHg    Respiratory:  SpO2: SpO2: 96 %  Nasal Cannula O2 Flow Rate (L/min): 3 L/min    Invasive/non-invasive ventilation settings   Respiratory  Report   Lab Data (Last 4 hours)    None         O2/Vent Data (Last 4 hours)    None                Physical Exam  Vitals and nursing note reviewed  Constitutional:       General: She is not in acute distress  Appearance: She is well-developed  She is obese  She is ill-appearing  Comments: Patient appears dry on physical exam, currently on 3 L nasal cannula saturating 95%  Comfortable not include   HENT:      Head: Normocephalic and atraumatic  Nose: Nose normal       Mouth/Throat:      Mouth: Mucous membranes are moist    Eyes:      Conjunctiva/sclera: Conjunctivae normal    Cardiovascular:      Rate and Rhythm: Normal rate and regular rhythm  Heart sounds: No murmur heard  Pulmonary:      Effort: Pulmonary effort is normal  No respiratory distress        Breath sounds: Normal breath sounds  Abdominal:      Palpations: Abdomen is soft  Tenderness: There is no abdominal tenderness  Musculoskeletal:      Cervical back: Neck supple  Right lower leg: No edema  Left lower leg: No edema  Comments: Patient appears to have a gangrene/necrosis on the heel   Skin:     General: Skin is warm and dry  Capillary Refill: Capillary refill takes less than 2 seconds  Neurological:      General: No focal deficit present  Mental Status: She is alert and oriented to person, place, and time     Psychiatric:         Mood and Affect: Mood normal              Laboratory and Diagnostics:  Results from last 7 days   Lab Units 11/10/22  0410 11/09/22  0439 11/08/22  0532 11/07/22  1520 11/07/22  0437 11/06/22  0458 11/05/22  0541   WBC Thousand/uL 16 06* 14 83* 16 57* 13 88* 8 74 8 87 9 62   HEMOGLOBIN g/dL 8 7* 8 6* 8 8* 8 7* 7 0* 7 8* 8 0*   HEMATOCRIT % 28 1* 28 2* 28 7* 27 2* 22 8* 25 7* 25 8*   PLATELETS Thousands/uL 60* 83* 105* 157 266 266 277   NEUTROS PCT %  --   --   --   --   --  69 72   BANDS PCT %  --   --   --  2  --   --   --    MONOS PCT %  --   --   --   --   --  8 7   MONO PCT %  --   --   --  3*  --   --   --      Results from last 7 days   Lab Units 11/11/22  0435 11/10/22  0410 11/09/22  0439 11/08/22  0532 11/07/22  1520 11/07/22  0437 11/06/22  0458   SODIUM mmol/L 139 140 141 139 140 137 138   POTASSIUM mmol/L 3 3* 3 5 3 5 3 6 4 3 3 8 3 8   CHLORIDE mmol/L 103 103 105 104 104 100 99   CO2 mmol/L 28 28 26 24 24 28 30   ANION GAP mmol/L 8 9 10 11 12 9 9   BUN mg/dL 18 15 17 19 19 19 19   CREATININE mg/dL 1 00 0 97 1 02 1 20 1 21 1 40* 1 37*   CALCIUM mg/dL 7 8* 8 0* 7 8* 7 4* 7 5* 8 2* 8 6   GLUCOSE RANDOM mg/dL 141* 130 158* 216* 207* 135 167*   ALT U/L  --   --   --   --  23  --   --    AST U/L  --   --   --   --  42  --   --    ALK PHOS U/L  --   --   --   --  209*  --   --    ALBUMIN g/dL  --   --   --   --  1 8*  --   --    TOTAL BILIRUBIN mg/dL  --   -- --   --  1 53*  --   --      Results from last 7 days   Lab Units 11/07/22  1520   MAGNESIUM mg/dL 1 7   PHOSPHORUS mg/dL 5 6*      Results from last 7 days   Lab Units 11/11/22  0032 11/10/22  1108 11/10/22  0410 11/09/22  1016 11/09/22  0336 11/08/22  1858 11/08/22  1322   PTT seconds 104* 94* 112* 84* 80* 55* 66*              ABG:  Results from last 7 days   Lab Units 11/10/22  2031   PH ART  7 458*   PCO2 ART mm Hg 35 3*   PO2 ART mm Hg 73 3*   HCO3 ART mmol/L 24 4   BASE EXC ART mmol/L 0 7   ABG SOURCE  Radial, Left     VBG:  Results from last 7 days   Lab Units 11/10/22  2031   ABG SOURCE  Radial, Left           Micro        EKG:  Sinus rhythm  Imaging: I have personally reviewed pertinent reports  Intake and Output  I/O       11/09 0701  11/10 0700 11/10 0701  11/11 0700 11/11 0701  11/12 0700    I V  (mL/kg) 785 6 (6 8) 450 8 (3 9)     IV Piggyback 250      Total Intake(mL/kg) 1035 6 (8 9) 450 8 (3 9)     Urine (mL/kg/hr) 1750 (0 6) 3360 (1 2)     Total Output 1750 3360     Net -714 4 -5612 2                  Height and Weights   Height: 5' 3 75" (161 9 cm)     Body mass index is 44 24 kg/m²  Weight (last 2 days)     Date/Time Weight    11/11/22 0554 116 (255 73)    11/10/22 0515 116 (255 73)    11/09/22 0600 114 (251 77)            Nutrition       Diet Orders   (From admission, onward)             Start     Ordered    11/10/22 1950  Diet Clear Liquid  Diet effective now        Comments: Can have clear fluids if tolerated  She's been cleared to have PO today  Tomorrow we can advance diet if tolerated   References:    Nutrtion Support Algorithm Enteral vs  Parenteral   Question Answer Comment   Diet Type Clear Liquid    RD to adjust diet per protocol?  Yes        11/10/22 1951                  Active Medications  Scheduled Meds:  Current Facility-Administered Medications   Medication Dose Route Frequency Provider Last Rate   • acetaminophen  650 mg Oral Q6H PRN Xi Frias MD     • acetaminophen  650 mg Oral [facility] Carl Florentin Horns, DO     • amLODIPine  10 mg Oral Daily Noel Gonzalez MD     • atorvastatin  40 mg Oral Daily With Radha Enriquez MD     • carvedilol  25 mg Oral BID With Meals Noel Gonzalez MD     • clopidogrel  75 mg Oral Daily Noel Gonzalez MD     • doxazosin  2 mg Oral Daily Ramakrishna Schmidt MD     • gabapentin  200 mg Oral BID Noel Gonzalez MD     • heparin (porcine)  3-30 Units/kg/hr (Order-Specific) Intravenous Titrated Noel Gonzalez MD 14 Units/kg/hr (11/11/22 4244)   • heparin (porcine)  4,400 Units Intravenous Q1H PRN Noel Gonzalez MD     • heparin (porcine)  8,800 Units Intravenous Q1H PRN Noel Gonzalez MD     • hydrALAZINE  10 mg Intravenous Q6H PRN Noel Gonzalez MD     • HYDROmorphone  0 5 mg Intravenous Q1H PRN Horacio Nolan, DO     • insulin glargine  25 Units Subcutaneous HS Noel Gonzalez MD     • insulin lispro  2-12 Units Subcutaneous TID AC Noel Gonzalez MD     • LORazepam  0 5 mg Oral Q8H PRN Noel Gonzalez MD     • metoclopramide  5 mg Intravenous Q6H PRN Noel Gonzalez MD     • naloxone  0 04 mg Intravenous Q1MIN PRN Rebecca Padmini Sher, DO     • nystatin   Topical BID Noel Gonzalez MD     • ondansetron  4 mg Intravenous Q6H PRN Noel Gonzalez MD     • oxyCODONE  10 mg Oral Q4H PRN Horacio Nolan, DO     • oxyCODONE  5 mg Oral Q4H PRN Horacio Nolan, DO     • pantoprazole  40 mg Oral Early Morning Noel Gonzalez MD     • QUEtiapine  25 mg Oral HS Carl Raphaels, DO     • sertraline  150 mg Oral Daily Carl Sher, DO       Continuous Infusions:  heparin (porcine), 3-30 Units/kg/hr (Order-Specific), Last Rate: 14 Units/kg/hr (11/11/22 0137)      PRN Meds:   acetaminophen, 650 mg, Q6H PRN  heparin (porcine), 4,400 Units, Q1H PRN  heparin (porcine), 8,800 Units, Q1H PRN  hydrALAZINE, 10 mg, Q6H PRN  HYDROmorphone, 0 5 mg, Q1H PRN  LORazepam, 0 5 mg, Q8H PRN  metoclopramide, 5 mg, Q6H PRN  naloxone, 0 04 mg, Q1MIN PRN  ondansetron, 4 mg, Q6H PRN  oxyCODONE, 10 mg, Q4H PRN  oxyCODONE, 5 mg, Q4H PRN        Invasive Devices Review  Invasive Devices  Report    Peripheral Intravenous Line  Duration           Peripheral IV 11/09/22 Left;Proximal;Upper;Ventral (anterior) Arm 1 day    Peripheral IV 11/10/22 Right;Ventral (anterior) Forearm 1 day          Drain  Duration           Urethral Catheter Latex 16 Fr  3 days                Rationale for remaining devices:  None  ---------------------------------------------------------------------------------------  Advance Directive and Living Will:      Power of :    POLST:    ---------------------------------------------------------------------------------------  Care Time Delivered:   No Critical Care time spent       Wade Tobias MD      Portions of the record may have been created with voice recognition software  Occasional wrong word or "sound a like" substitutions may have occurred due to the inherent limitations of voice recognition software    Read the chart carefully and recognize, using context, where substitutions have occurred

## 2022-11-11 NOTE — WOUND OSTOMY CARE
Consult Note - Wound   Elveria Im 65 y o  female MRN: [id number]  Unit/Bed#: ICU 01 Encounter: [id number]      History and Present Illness:  65year old female presented to the hospital with left foot wound  Patient's history significant for DM, HTN, obesity, depression, stroke  Assessment Findings:   Patient assessed along with primary RN  She requires assist x 2 to turn in bed, on low air-loss mattress  Nutritional team following  Buttocks, and sacrum, and back intact at this time  Incision to right groin with dressing dry and intact  1   Left posterior ankle--wound of unknown etiology/diabetic ulcer  Two circular areas of brown/black dry eschar with partial thickness, beefy red open area immediately distal   Scant serosanguinous drainage  Michaela-wound is cold to touch and purple  Podiatry managing  2   Diabetic ulcer to left heel--dry brown eschar with moist yellow slough edges  Scant yellow drainage  Michaela-wound cold to touch and purple  Podiatry managing  3   Right leg/lateral thigh--right lower extremity with significant bulla (more medially) and purple coloration likely related to vascular status  However, presentation is very atypical and worsening per nursing team (purple and blistered areas extending proximally, now with petechiae and scattered erythematous areas to abdomen)  Some bulla have opened revealing beefy red, partial thickness wound bed with small serosanguinous drainage  Michaela-wound purple  4   Right lateral foot wound--5th toe dry, black with moist desquamation along lateral foot  Foot is cold to touch  Podiatry and vascular team managing  5  MASD/intertriginous dermatitis to anterior and left abdominal fold extending into left groin fold--pink, partial thickness, linear open areas along skin fold  No drainage  Michaela-wound intact  No evidence of fungal infection  See flowsheet for wound details  Wound Care Plan:   1-P500 low air-loss mattress    2-Elevate heels off of bed/chair surface to offload pressure  3-Turn/reposition every 2 hours while in bed using positioning wedges for pressure re-distribution on skin  4-Bilateral foot/ankle--per podiatry and vascular team   5-Abdominal/groin folds--cleanse with soap and water, pat  Apply Interdry sheet in single layer along skin folds  Allow approximately 2 inches of Interdry sheet to extend beyond skin fold  Do not use any other creams or powders to the area where Interdry is in use  Remove daily for bathing and re-apply  Change interdry sheets every 3 days or if soiled  6-Right thigh/leg--cleanse with normal saline, pat dry  Apply Adaptic and ABDs to open blisters  Wrap with rosalva  Change dressing daily  Leave intact blisters open to air  Wound care team to follow  Plan of care reviewed with primary RN  Discussed with critical care resident the atypical and extending presentation of purple/bullae  Wound 10/22/22 Ankle Left;Posterior (Active)   Wound Image   11/11/22 1255   Wound Description Black; Brown;Eschar;Beefy red 11/11/22 1255   Michaela-wound Assessment Purple;Fragile 11/11/22 1255   Wound Length (cm) 7 cm 11/11/22 1255   Wound Width (cm) 3 cm 11/11/22 1255   Wound Depth (cm) 0 1 cm 11/11/22 1255   Wound Surface Area (cm^2) 21 cm^2 11/11/22 1255   Wound Volume (cm^3) 2 1 cm^3 11/11/22 1255   Calculated Wound Volume (cm^3) 2 1 cm^3 11/11/22 1255   Drainage Amount Scant 11/11/22 1255   Drainage Description Serosanguineous 11/11/22 1255   Treatments Cleansed 11/11/22 1255   Dressing Non adherent;ABD;Gauze 11/11/22 1255   Dressing Changed Changed 11/11/22 1255   Patient Tolerance Tolerated well 11/11/22 1255   Dressing Status Clean;Dry; Intact 11/11/22 1255       Wound 11/11/22 Heel Left;Posterior (Active)   Wound Image   11/11/22 1254   Wound Description Dry;Brown;Yellow;Slough;Eschar 11/11/22 1254   Michaela-wound Assessment Purple 11/11/22 1254   Wound Length (cm) 8 cm 11/11/22 1254   Wound Width (cm) 12 5 cm 11/11/22 1254   Wound Depth (cm) 0 cm 11/11/22 1254   Wound Surface Area (cm^2) 100 cm^2 11/11/22 1254   Wound Volume (cm^3) 0 cm^3 11/11/22 1254   Calculated Wound Volume (cm^3) 0 cm^3 11/11/22 1254   Drainage Amount Scant 11/11/22 1254   Drainage Description Yellow 11/11/22 1254   Treatments Elevated 11/11/22 1254   Dressing Non adherent;ABD;Gauze 11/11/22 1254   Dressing Changed Changed 11/11/22 1254   Patient Tolerance Tolerated well 11/11/22 1254   Dressing Status Clean;Dry; Intact 11/11/22 1254       Wound 11/11/22 Thigh Right;Lateral (Active)   Wound Image   11/11/22 1304       Wound 11/11/22 Foot Right;Lateral (Active)   Wound Image   11/11/22 1311   Wound Length (cm) 14 cm 11/11/22 1311   Wound Width (cm) 2 5 cm 11/11/22 1311   Wound Depth (cm) 0 1 cm 11/11/22 1311   Wound Surface Area (cm^2) 35 cm^2 11/11/22 1311   Wound Volume (cm^3) 3 5 cm^3 11/11/22 1311   Calculated Wound Volume (cm^3) 3 5 cm^3 11/11/22 1311       Wound 11/11/22 MASD Abdomen Mid;Anterior (Active)   Wound Image   11/11/22 1258   Wound Description Pink 11/11/22 1258   Michaela-wound Assessment Intact;Dry;Clean 11/11/22 1258   Wound Length (cm) 0 5 cm 11/11/22 1258   Wound Width (cm) 8 cm 11/11/22 1258   Wound Depth (cm) 0 1 cm 11/11/22 1258   Wound Surface Area (cm^2) 4 cm^2 11/11/22 1258   Wound Volume (cm^3) 0 4 cm^3 11/11/22 1258   Calculated Wound Volume (cm^3) 0 4 cm^3 11/11/22 1258   Drainage Amount Scant 11/11/22 1258   Drainage Description Serous 11/11/22 1258   Non-staged Wound Description Partial thickness 11/11/22 1258   Treatments Cleansed 11/11/22 1258   Dressing Other (Comment) 11/11/22 1258   Dressing Changed New 11/11/22 1258   Patient Tolerance Tolerated well 11/11/22 1258   Dressing Status Clean;Dry; Intact 11/11/22 1258       Wound 11/11/22 MASD Abdomen Left (Active)   Wound Image   11/11/22 1258   Wound Description Pink 11/11/22 1258   Michaela-wound Assessment Intact 11/11/22 1258   Wound Length (cm) 3 cm 11/11/22 1258   Wound Width (cm) 7 cm 11/11/22 1258   Wound Depth (cm) 0 1 cm 11/11/22 1258   Wound Surface Area (cm^2) 21 cm^2 11/11/22 1258   Wound Volume (cm^3) 2 1 cm^3 11/11/22 1258   Calculated Wound Volume (cm^3) 2 1 cm^3 11/11/22 1258   Drainage Amount Scant 11/11/22 1258   Drainage Description Serous 11/11/22 1258   Non-staged Wound Description Partial thickness 11/11/22 1258   Treatments Cleansed 11/11/22 1258   Dressing Other (Comment) 11/11/22 1258   Dressing Changed New 11/11/22 1258   Patient Tolerance Tolerated well 11/11/22 1258   Dressing Status Clean;Dry; Intact 11/11/22 [id number] [facility] BSN, RN, [employer]

## 2022-11-12 LAB
ALBUMIN SERPL BCP-MCNC: 1.5 G/DL (ref 3.5–5)
ALP SERPL-CCNC: 102 U/L (ref 46–116)
ALT SERPL W P-5'-P-CCNC: 46 U/L (ref 12–78)
ANION GAP SERPL CALCULATED.3IONS-SCNC: 8 MMOL/L (ref 4–13)
APTT PPP: 104 SECONDS (ref 23–37)
APTT PPP: 109 SECONDS (ref 23–37)
APTT PPP: 110 SECONDS (ref 23–37)
APTT PPP: 112 SECONDS (ref 23–37)
APTT PPP: 88 SECONDS (ref 23–37)
AST SERPL W P-5'-P-CCNC: 95 U/L (ref 5–45)
BACTERIA UR QL AUTO: ABNORMAL /HPF
BILIRUB DIRECT SERPL-MCNC: 0.18 MG/DL (ref 0–0.2)
BILIRUB SERPL-MCNC: 0.48 MG/DL (ref 0.2–1)
BILIRUB UR QL STRIP: NEGATIVE
BUN SERPL-MCNC: 17 MG/DL (ref 5–25)
CALCIUM SERPL-MCNC: 7.9 MG/DL (ref 8.3–10.1)
CHLORIDE SERPL-SCNC: 103 MMOL/L (ref 96–108)
CLARITY UR: ABNORMAL
CO2 SERPL-SCNC: 29 MMOL/L (ref 21–32)
COLOR UR: YELLOW
CREAT SERPL-MCNC: 1.03 MG/DL (ref 0.6–1.3)
DEPRECATED AT III PPP: 79 % OF NORMAL (ref 92–136)
ERYTHROCYTE [DISTWIDTH] IN BLOOD BY AUTOMATED COUNT: 15.3 % (ref 11.6–15.1)
GFR SERPL CREATININE-BSD FRML MDRD: 58 ML/MIN/1.73SQ M
GLUCOSE SERPL-MCNC: 100 MG/DL (ref 65–140)
GLUCOSE SERPL-MCNC: 107 MG/DL (ref 65–140)
GLUCOSE SERPL-MCNC: 111 MG/DL (ref 65–140)
GLUCOSE SERPL-MCNC: 113 MG/DL (ref 65–140)
GLUCOSE SERPL-MCNC: 96 MG/DL (ref 65–140)
GLUCOSE UR STRIP-MCNC: NEGATIVE MG/DL
HCT VFR BLD AUTO: 24.9 % (ref 34.8–46.1)
HGB BLD-MCNC: 7.8 G/DL (ref 11.5–15.4)
HGB UR QL STRIP.AUTO: ABNORMAL
KETONES UR STRIP-MCNC: NEGATIVE MG/DL
LEUKOCYTE ESTERASE UR QL STRIP: ABNORMAL
MCH RBC QN AUTO: 27.9 PG (ref 26.8–34.3)
MCHC RBC AUTO-ENTMCNC: 31.3 G/DL (ref 31.4–37.4)
MCV RBC AUTO: 89 FL (ref 82–98)
NITRITE UR QL STRIP: POSITIVE
NON-SQ EPI CELLS URNS QL MICRO: ABNORMAL /HPF
OTHER STN SPEC: ABNORMAL
PH UR STRIP.AUTO: 5.5 [PH]
PLATELET # BLD AUTO: 41 THOUSANDS/UL (ref 149–390)
PMV BLD AUTO: 11.1 FL (ref 8.9–12.7)
POTASSIUM SERPL-SCNC: 3.4 MMOL/L (ref 3.5–5.3)
PROT SERPL-MCNC: 5.8 G/DL (ref 6.4–8.4)
PROT UR STRIP-MCNC: >=300 MG/DL
RBC # BLD AUTO: 2.8 MILLION/UL (ref 3.81–5.12)
RBC #/AREA URNS AUTO: ABNORMAL /HPF
SODIUM SERPL-SCNC: 140 MMOL/L (ref 135–147)
SP GR UR STRIP.AUTO: 1.02 (ref 1–1.03)
TPA PPP QL CHRO: 67 % OF NORMAL (ref 77–138)
UROBILINOGEN UR QL STRIP.AUTO: 1 E.U./DL
WBC # BLD AUTO: 15.36 THOUSAND/UL (ref 4.31–10.16)
WBC #/AREA URNS AUTO: ABNORMAL /HPF

## 2022-11-12 PROCEDURE — 87077 CULTURE AEROBIC IDENTIFY: CPT | Performed by: INTERNAL MEDICINE

## 2022-11-12 PROCEDURE — 80076 HEPATIC FUNCTION PANEL: CPT | Performed by: NURSE PRACTITIONER

## 2022-11-12 PROCEDURE — 85027 COMPLETE CBC AUTOMATED: CPT | Performed by: INTERNAL MEDICINE

## 2022-11-12 PROCEDURE — 87106 FUNGI IDENTIFICATION YEAST: CPT | Performed by: INTERNAL MEDICINE

## 2022-11-12 PROCEDURE — 87086 URINE CULTURE/COLONY COUNT: CPT | Performed by: INTERNAL MEDICINE

## 2022-11-12 PROCEDURE — 87186 SC STD MICRODIL/AGAR DIL: CPT | Performed by: INTERNAL MEDICINE

## 2022-11-12 PROCEDURE — 85730 THROMBOPLASTIN TIME PARTIAL: CPT | Performed by: INTERNAL MEDICINE

## 2022-11-12 PROCEDURE — 99232 SBSQ HOSP IP/OBS MODERATE 35: CPT | Performed by: STUDENT IN AN ORGANIZED HEALTH CARE EDUCATION/TRAINING PROGRAM

## 2022-11-12 PROCEDURE — 85730 THROMBOPLASTIN TIME PARTIAL: CPT | Performed by: NURSE PRACTITIONER

## 2022-11-12 PROCEDURE — 99233 SBSQ HOSP IP/OBS HIGH 50: CPT | Performed by: INTERNAL MEDICINE

## 2022-11-12 PROCEDURE — 81001 URINALYSIS AUTO W/SCOPE: CPT | Performed by: STUDENT IN AN ORGANIZED HEALTH CARE EDUCATION/TRAINING PROGRAM

## 2022-11-12 PROCEDURE — 82948 REAGENT STRIP/BLOOD GLUCOSE: CPT

## 2022-11-12 PROCEDURE — 99232 SBSQ HOSP IP/OBS MODERATE 35: CPT | Performed by: SURGERY

## 2022-11-12 PROCEDURE — 80048 BASIC METABOLIC PNL TOTAL CA: CPT | Performed by: INTERNAL MEDICINE

## 2022-11-12 RX ORDER — POTASSIUM CHLORIDE 20 MEQ/1
20 TABLET, EXTENDED RELEASE ORAL ONCE
Status: COMPLETED | OUTPATIENT
Start: 2022-11-12 | End: 2022-11-12

## 2022-11-12 RX ORDER — POTASSIUM CHLORIDE 20 MEQ/1
40 TABLET, EXTENDED RELEASE ORAL ONCE
Status: COMPLETED | OUTPATIENT
Start: 2022-11-12 | End: 2022-11-12

## 2022-11-12 RX ORDER — POLYETHYLENE GLYCOL 3350 17 G/17G
238 POWDER, FOR SOLUTION ORAL DAILY PRN
Status: DISCONTINUED | OUTPATIENT
Start: 2022-11-12 | End: 2022-11-12

## 2022-11-12 RX ORDER — FUROSEMIDE 10 MG/ML
40 INJECTION INTRAMUSCULAR; INTRAVENOUS ONCE
Status: COMPLETED | OUTPATIENT
Start: 2022-11-12 | End: 2022-11-12

## 2022-11-12 RX ORDER — POLYETHYLENE GLYCOL 3350 17 G/17G
17 POWDER, FOR SOLUTION ORAL DAILY PRN
Status: DISCONTINUED | OUTPATIENT
Start: 2022-11-12 | End: 2022-11-14

## 2022-11-12 RX ORDER — BUMETANIDE 1 MG/1
1 TABLET ORAL 2 TIMES DAILY
Status: DISCONTINUED | OUTPATIENT
Start: 2022-11-12 | End: 2022-11-13

## 2022-11-12 RX ADMIN — OXYCODONE HYDROCHLORIDE 10 MG: 10 TABLET ORAL at 00:36

## 2022-11-12 RX ADMIN — CARVEDILOL 25 MG: 25 TABLET, FILM COATED ORAL at 16:40

## 2022-11-12 RX ADMIN — OXYCODONE HYDROCHLORIDE 10 MG: 10 TABLET ORAL at 17:45

## 2022-11-12 RX ADMIN — ACETAMINOPHEN 650 MG: 325 TABLET, FILM COATED ORAL at 23:46

## 2022-11-12 RX ADMIN — CARVEDILOL 25 MG: 25 TABLET, FILM COATED ORAL at 08:39

## 2022-11-12 RX ADMIN — POTASSIUM CHLORIDE 40 MEQ: 1500 TABLET, EXTENDED RELEASE ORAL at 05:56

## 2022-11-12 RX ADMIN — ATORVASTATIN CALCIUM 40 MG: 40 TABLET, FILM COATED ORAL at 16:39

## 2022-11-12 RX ADMIN — HYDROMORPHONE HYDROCHLORIDE 0.5 MG: 1 INJECTION, SOLUTION INTRAMUSCULAR; INTRAVENOUS; SUBCUTANEOUS at 07:55

## 2022-11-12 RX ADMIN — DOXAZOSIN 2 MG: 2 TABLET ORAL at 08:39

## 2022-11-12 RX ADMIN — QUETIAPINE FUMARATE 25 MG: 25 TABLET ORAL at 23:46

## 2022-11-12 RX ADMIN — ACETAMINOPHEN 650 MG: 325 TABLET, FILM COATED ORAL at 05:02

## 2022-11-12 RX ADMIN — AMLODIPINE BESYLATE 10 MG: 10 TABLET ORAL at 08:40

## 2022-11-12 RX ADMIN — GABAPENTIN 200 MG: 100 CAPSULE ORAL at 08:39

## 2022-11-12 RX ADMIN — NYSTATIN 1 APPLICATION.: 100000 POWDER TOPICAL at 17:41

## 2022-11-12 RX ADMIN — GABAPENTIN 200 MG: 100 CAPSULE ORAL at 17:41

## 2022-11-12 RX ADMIN — CLOPIDOGREL BISULFATE 75 MG: 75 TABLET ORAL at 08:39

## 2022-11-12 RX ADMIN — ACETAMINOPHEN 650 MG: 325 TABLET, FILM COATED ORAL at 14:45

## 2022-11-12 RX ADMIN — NYSTATIN: 100000 POWDER TOPICAL at 08:41

## 2022-11-12 RX ADMIN — OXYCODONE HYDROCHLORIDE 10 MG: 10 TABLET ORAL at 05:02

## 2022-11-12 RX ADMIN — OXYCODONE HYDROCHLORIDE 10 MG: 10 TABLET ORAL at 10:21

## 2022-11-12 RX ADMIN — PANTOPRAZOLE SODIUM 40 MG: 40 TABLET, DELAYED RELEASE ORAL at 05:02

## 2022-11-12 RX ADMIN — POTASSIUM CHLORIDE 20 MEQ: 1500 TABLET, EXTENDED RELEASE ORAL at 08:40

## 2022-11-12 RX ADMIN — BUMETANIDE 1 MG: 1 TABLET ORAL at 17:41

## 2022-11-12 RX ADMIN — HYDROMORPHONE HYDROCHLORIDE 0.5 MG: 1 INJECTION, SOLUTION INTRAMUSCULAR; INTRAVENOUS; SUBCUTANEOUS at 02:29

## 2022-11-12 RX ADMIN — SERTRALINE HYDROCHLORIDE 150 MG: 100 TABLET ORAL at 08:39

## 2022-11-12 RX ADMIN — FUROSEMIDE 40 MG: 10 INJECTION, SOLUTION INTRAVENOUS at 10:21

## 2022-11-12 NOTE — ASSESSMENT & PLAN NOTE
Lab Results   Component Value Date    WBC 15 69 (H) 11/11/2022    WBC 16 06 (H) 11/10/2022    WBC 14 83 (H) 11/09/2022     Echo on 11/07 shows 54% LVEF, no diastolic dysfunction  After the procedure patient was noted to have desaturated to 85% SpO2, and needed 5 L via face mask to saturate greater than 90%  Weaned down to 3 L saturating 95%     Continue to wean as able

## 2022-11-12 NOTE — ASSESSMENT & PLAN NOTE
Patient has uncontrolled diabetes, hypertension, hyperlipidemia, patient was lost to follow-up and has not seen endocrinologist for over a year  Admits that she is poorly compliant with medication  Admitted due to Worsening lower extremity wounds  · CTA abd/pelvis:   § 2   LEFT: Up to 50% stenosis of the proximal common iliac artery   The proximal and distal 3rd of the superficial femoral artery demonstrates 50-75% stenosis   Popliteal artery demonstrates greater than 75% stenosis in the P2 segment   Patent   three-vessel runoff extending into the left foot  § 3  RIGHT: Proximal common iliac artery stenosis of up to 50%   50% stenosis of the common femoral artery   Superficial femoral artery demonstrates up to 50% stenosis within the proximal several centimeters, 50-75% stenosis throughout the distal 3rd of   the vessel   Popliteal artery demonstrates scattered stenoses of 50-75%   Patent three-vessel runoff extending into the right foot    S/p abdominal aortogram,  LLE SFA shockwave angioplasty/FATOU 10/31/22   Post procedure  There was evidence of improvement in SHARIF in the left however on the right, SHARIF was noted to decrease to 0 51 (0 80)  CTA abd/pelvis w/ run off 11/1/22: Flow-limiting dissection flap at the prior arteriotomy site in the R CFA superimposed upon moderate to severe atherosclerotic disease  Focal severe atherosclerotic narrowing at the adductor canal with additional moderate atherosclerotic narrowing throughout the remainder of the R SFA    S/p R femoral endarterectomy 11/7  Severe pain post procedure  CTA 11/9 shows Suspect interval occlusion of recently placed proximal right common iliac artery stent  11/10 s/p aspiration thrombectomy in right common iliac artery and Left SFA  S/p Thrombosed distal left SFA stent treated with aspiration thrombectomy  For monitoring in the ICU  Will monitor post procedure in the ICU  Heparin gtt transitioned to Argatroban infusion in setting of thrombocytopenia on 11/11   Continue atorvastatin 40 mg once daily, clopidogrel 45 mg once daily  Vascular is the primary

## 2022-11-12 NOTE — PROGRESS NOTES
[facility]  Progress Note - [address] 1960, 65 y o  female MRN: [id number]  Unit/Bed#: ICU 01 Encounter: [id number]  Primary Care Provider: ALEX Redmond   Date and time admitted to hospital: 10/21/2022  7:58 PM    * PAD (peripheral artery disease) [facility])  Assessment & Plan  Patient has uncontrolled diabetes, hypertension, hyperlipidemia, patient was lost to follow-up and has not seen endocrinologist for over a year  Admits that she is poorly compliant with medication  Admitted due to Worsening lower extremity wounds  · CTA abd/pelvis:   § 2   LEFT: Up to 50% stenosis of the proximal common iliac artery   The proximal and distal 3rd of the superficial femoral artery demonstrates 50-75% stenosis   Popliteal artery demonstrates greater than 75% stenosis in the P2 segment   Patent   three-vessel runoff extending into the left foot  § 3  RIGHT: Proximal common iliac artery stenosis of up to 50%   50% stenosis of the common femoral artery   Superficial femoral artery demonstrates up to 50% stenosis within the proximal several centimeters, 50-75% stenosis throughout the distal 3rd of   the vessel   Popliteal artery demonstrates scattered stenoses of 50-75%   Patent three-vessel runoff extending into the right foot    S/p abdominal aortogram,  LLE SFA shockwave angioplasty/FATOU 10/31/22   Post procedure  There was evidence of improvement in SHARIF in the left however on the right, SHARIF was noted to decrease to 0 51 (0 80)  CTA abd/pelvis w/ run off 11/1/22: Flow-limiting dissection flap at the prior arteriotomy site in the R CFA superimposed upon moderate to severe atherosclerotic disease  Focal severe atherosclerotic narrowing at the adductor canal with additional moderate atherosclerotic narrowing throughout the remainder of the R SFA    S/p R femoral endarterectomy 11/7  Severe pain post procedure  CTA 11/9 shows Suspect interval occlusion of recently placed proximal right common iliac artery stent  11/10 s/p aspiration thrombectomy in right common iliac artery and Left SFA  S/p Thrombosed distal left SFA stent treated with aspiration thrombectomy  For monitoring in the ICU  Will monitor post procedure in the ICU  Heparin gtt transitioned to Argatroban infusion in setting of thrombocytopenia on 11/11   Continue atorvastatin 40 mg once daily, clopidogrel 45 mg once daily  Vascular is the primary    Thrombocytopenia [facility])  Assessment & Plan  Significant reduction in platelet > 50 % in the past 4 days  Patient started on Heparin infusion on 11/1 and SQ heparin prior  4Ts score 5- intermediate probability for HIIT  Ruled out DIC/DITP/ less likely infectious origin  Plan  DIC Screen- FDP, D Dimers, hemolysis screen, fibrinogen  HIT platelet antibody  Antithrombin III  Heparin gtt discontinued and Argatroban initiated    Acute respiratory failure with hypoxia [facility])  Assessment & Plan  Lab Results   Component Value Date    WBC 15 69 (H) 11/11/2022    WBC 16 06 (H) 11/10/2022    WBC 14 83 (H) 11/09/2022     Echo on 11/07 shows 43% LVEF, no diastolic dysfunction  After the procedure patient was noted to have desaturated to 85% SpO2, and needed 5 L via face mask to saturate greater than 90%  Weaned down to 3 L saturating 95%  Continue to wean as able      Diabetic ulcer of heel associated with diabetes mellitus due to underlying condition [facility])  Assessment & Plan    Podiatry on board with recommendations  below appreciated  · No immediate plan for podiatric surgical intervention  Dressings changed, no clinical sign of acute infection B/L  · Continue local wound care, betadine DSD  Appreciate nursing assistance with dressing changes  · Elevation on green foam wedges or pillows when non-ambulatory    · Rest of care per primary team         Acute on chronic anemia  Assessment & Plan  Lab Results   Component Value Date    HGB 8 2 (L) 11/11/2022    HGB 8 7 (L) 11/10/2022    HGB 8 6 (L) 11/09/2022     Hemoglobin fluctuating with baseline around 10  S/p 1 unit transfused 11/3/2022, H/H improved and has remained stable ever since  FOBT neg, no current source of bleeding, groin without hematoma  Continue to monitor H/H  Transfuse for Hgb less than 7     Type 2 diabetes mellitus with hyperglycemia, with long-term current use of insulin [facility])  Assessment & Plan  Lab Results   Component Value Date    HGBA1C 9 8 (H) 10/25/2022       Recent Labs     11/11/22  0715 11/11/22  1210 11/11/22  1545 11/11/22  2104   POCGLU 146* 142* 161* 141*       Blood Sugar Average: Last 72 hrs:  (P) 177 [id number]   Patient's home medication 60 units glargine at bedtime, Humalog 20 units t i d  Continue glargine 25 unitis  Will increase glargine to 25 units  Pt on CLD    Benign essential hypertension  Assessment & Plan  Vitals:    11/11/22 2100 11/11/22 2200 11/11/22 2300 11/12/22 0000   BP: 146/72 136/72 154/70 149/71   BP Location:    Right arm   Pulse: 80 80 80 80   Resp: (!) 32 (!) 38 (!) 40 19   Temp:       TempSrc:       SpO2: 94% 95% 96% 96%   Weight:       Height:       Patient's blood pressure has been running high in the 180s, 190s SBP  Patient's home dose of amlodipine 10 mg, lisinopril 40 mg, metoprolol succinate 25 mg daily  Lisinopril was held due to NYDIA  Plan:  Continue amlodipine 10 mg once daily, continue Coreg 25 mg b i d  Doxazosin 2 mg daily  Continue hydralazine 10 mg every 6 hours p r n  For SBP greater than 180  Hold off home lisinopril per nephrology due to NYDIA      Depression, recurrent [facility])  Assessment & Plan  ·  Seen by Psychiatry during this admission  Unspecified mood disorder with psychosis; rule out mood disorder with psychosis secondary to other physiological condition; rule out major depression with psychotic features  Plan    Recommend increasing Zoloft to 150 mg p o  daily for depression and anxiety    Seroquel 25 mg p o  q h s  as antidepressant adjunct as well as for complaints of insomnia, anxiety and hallucinations            ----------------------------------------------------------------------------------------  HPI/24hr events:   Heparin gtt discontinued, Argatroban gtt initiated    Patient appropriate for transfer out of the ICU today?: No  Disposition: Continue Stepdown Level 1 level of care   Code Status: Level 1 - Full Code  ---------------------------------------------------------------------------------------  SUBJECTIVE  " My legs hurt"    Review of Systems   Constitutional: Positive for fatigue  HENT: Negative  Eyes: Negative  Respiratory: Negative for shortness of breath  Cardiovascular: Negative for chest pain  Gastrointestinal: Negative  Endocrine: Negative  Genitourinary: Negative  Musculoskeletal:        Leg pain bilaterally   Skin: Positive for color change, pallor and wound  Hematological: Negative  Psychiatric/Behavioral: Negative  All other systems reviewed and are negative       ---------------------------------------------------------------------------------------  OBJECTIVE    Vitals   Vitals:    22 2100 22 2200 22 2300 22 0000   BP: 146/72 136/72 154/70 149/71   BP Location:    Right arm   Pulse: 80 80 80 80   Resp: (!) 32 (!) 38 (!) 40 19   Temp:       TempSrc:       SpO2: 94% 95% 96% 96%   Weight:       Height:         Temp (24hrs), Av °F (37 2 °C), Min:98 4 °F (36 9 °C), Max:99 3 °F (37 4 °C)  Current: Temperature: 99 3 °F (37 4 °C)  Arterial Line BP: 112/72  Arterial Line MAP (mmHg): 92 mmHg    Respiratory:  SpO2: SpO2: 96 %  Nasal Cannula O2 Flow Rate (L/min): 3 L/min      Physical Exam  Constitutional:       Appearance: She is obese  She is ill-appearing  Interventions: Nasal cannula in place  HENT:      Head: Normocephalic and atraumatic  Eyes:      Pupils: Pupils are equal, round, and reactive to light  Neck:      Vascular: No JVD        Trachea: Trachea normal  Cardiovascular:      Rate and Rhythm: Normal rate and regular rhythm  Pulses:           Dorsalis pedis pulses are detected w/ Doppler on the right side and detected w/ Doppler on the left side  Heart sounds: No murmur heard  No friction rub  No gallop  Comments: LE with mottled skin, blisters, and dressings  Pulmonary:      Effort: Tachypnea present  Breath sounds: Decreased air movement present  Abdominal:      Tenderness: There is no abdominal tenderness  Comments: Obese, round, soft, NT, +ve bowel sounds   Genitourinary:     Comments: Jacinto to gravity  Musculoskeletal:      Cervical back: Full passive range of motion without pain and neck supple  Right lower leg: Edema present  Left lower leg: Edema present  Skin:     General: Skin is cool  Comments: Mottled skin and blisters noted b/l LE   Neurological:      General: No focal deficit present  Mental Status: She is alert and oriented to person, place, and time               Laboratory and Diagnostics:  Results from last 7 days   Lab Units 11/11/22  1918 11/11/22  0435 11/10/22  0410 11/09/22  0439 11/08/22  0532 11/07/22  1520 11/07/22 0437 11/06/22  0458 11/05/22  0541   WBC Thousand/uL  --  15 69* 16 06* 14 83* 16 57* 13 88* 8 74 8 87 9 62   HEMOGLOBIN g/dL  --  8 2* 8 7* 8 6* 8 8* 8 7* 7 0* 7 8* 8 0*   HEMATOCRIT %  --  26 4* 28 1* 28 2* 28 7* 27 2* 22 8* 25 7* 25 8*   PLATELETS Thousands/uL 36* 40* 60* 83* 105* 157 266 266 277   NEUTROS PCT %  --   --   --   --   --   --   --  69 72   BANDS PCT %  --   --   --   --   --  2  --   --   --    MONOS PCT %  --   --   --   --   --   --   --  8 7   MONO PCT %  --   --   --   --   --  3*  --   --   --      Results from last 7 days   Lab Units 11/11/22  0435 11/10/22  0410 11/09/22  0439 11/08/22  0532 11/07/22  1520 11/07/22 0437 11/06/22  0458   SODIUM mmol/L 139 140 141 139 140 137 138   POTASSIUM mmol/L 3 3* 3 5 3 5 3 6 4 3 3 8 3 8   CHLORIDE mmol/L 103 103 105 104 104 100 99   CO2 mmol/L 28 28 26 24 24 28 30   ANION GAP mmol/L 8 9 10 11 12 9 9   BUN mg/dL 18 15 17 19 19 19 19   CREATININE mg/dL 1 00 0 97 1 02 1 20 1 21 1 40* 1 37*   CALCIUM mg/dL 7 8* 8 0* 7 8* 7 4* 7 5* 8 2* 8 6   GLUCOSE RANDOM mg/dL 141* 130 158* 216* 207* 135 167*   ALT U/L 44  --   --   --  23  --   --    AST U/L 98*  --   --   --  42  --   --    ALK PHOS U/L 112  --   --   --  209*  --   --    ALBUMIN g/dL 1 6*  --   --   --  1 8*  --   --    TOTAL BILIRUBIN mg/dL 0 49  --   --   --  1 53*  --   --      Results from last 7 days   Lab Units 11/07/22  1520   MAGNESIUM mg/dL 1 7   PHOSPHORUS mg/dL 5 6*      Results from last 7 days   Lab Units 11/12/22  0005 11/11/22  2159 11/11/22  2115 11/11/22  1918 11/11/22  1435 11/11/22  0730 11/11/22  0032   INR   --   --   --  3 70*  --   --   --    PTT seconds 88* 86* 90* 86* 84* 101* 104*              ABG:  Results from last 7 days   Lab Units 11/10/22  2031   PH ART  7 458*   PCO2 ART mm Hg 35 3*   PO2 ART mm Hg 73 3*   HCO3 ART mmol/L 24 4   BASE EXC ART mmol/L 0 7   ABG SOURCE  Radial, Left     VBG:  Results from last 7 days   Lab Units 11/10/22  2031   ABG SOURCE  Radial, Left           Micro        EKG: SR on tele  Imaging:     Intake and Output  I/O       11/10 0701 11/11 0700 11/11 0701 11/12 0700    I V  (mL/kg) 450 8 (3 9) 226 (1 9)    IV Piggyback  50    Total Intake(mL/kg) 450 8 (3 9) 276 (2 4)    Urine (mL/kg/hr) 3360 (1 2) 1275 (0 5)    Total Output 3360 1275    Net -2909 2 -999                   Height and Weights   Height: 5' 3 75" (161 9 cm)     Body mass index is 44 24 kg/m²  Weight (last 2 days)     Date/Time Weight    11/11/22 0554 116 (255 73)    11/10/22 0515 116 (255 73)            Nutrition       Diet Orders   (From admission, onward)             Start     Ordered    11/10/22 1950  Diet Clear Liquid  Diet effective now        Comments: Can have clear fluids if tolerated    She's been cleared to have PO today  Tomorrow we can advance diet if tolerated   References:    Nutrtion Support Algorithm Enteral vs  Parenteral   Question Answer Comment   Diet Type Clear Liquid    RD to adjust diet per protocol?  Yes        11/10/22 1951                  Active Medications  Scheduled Meds:  Current Facility-Administered Medications   Medication Dose Route Frequency Provider Last Rate   • acetaminophen  650 mg Oral Q6H PRN Franklyn Lao MD     • acetaminophen  650 mg Oral Q8H [id number] [facility], DO     • amLODIPine  10 mg Oral Daily Franklyn Lao MD     • argatroban  0 1-3 mcg/kg/min Intravenous Titrated BEN ForresterNP 0 2 mcg/kg/min (11/11/22 2220)   • atorvastatin  40 mg Oral Daily With Hoa Cortez MD     • carvedilol  25 mg Oral BID With Meals Franklyn Lao MD     • clopidogrel  75 mg Oral Daily Franklyn Lao MD     • doxazosin  2 mg Oral Daily Omar Aragon MD     • fentanyl citrate (PF)  50 mcg Intravenous Q1H PRN Franklyn Lao MD     • gabapentin  200 mg Oral BID Franklyn Lao MD     • hydrALAZINE  10 mg Intravenous Q6H PRN Franklyn Lao MD     • HYDROmorphone  0 5 mg Intravenous Q1H PRN Toni Pack, DO     • insulin glargine  25 Units Subcutaneous HS Franklyn Lao MD     • insulin lispro  2-12 Units Subcutaneous TID AC Franklyn Lao MD     • LORazepam  0 5 mg Oral Q8H PRN Franklyn Lao MD     • metoclopramide  5 mg Intravenous Q6H PRN Franklyn Lao MD     • naloxone  0 04 mg Intravenous Q1MIN PRN Odette Mendoza, DO     • nystatin   Topical BID Franklyn Lao MD     • ondansetron  4 mg Intravenous Q6H PRN Franklyn Lao MD     • oxyCODONE  10 mg Oral Q4H PRN Toni Pack, DO     • oxyCODONE  5 mg Oral Q4H PRN Toni Pack, DO     • pantoprazole  40 mg Oral Early Morning Franklyn Lao MD     • QUEtiapine  25 mg Oral HS Carl Mendoza, DO     • sertraline  150 mg Oral Daily Carl Torrez DO       Continuous Infusions:  argatroban, 0 1-3 mcg/kg/min, Last Rate: 0 2 mcg/kg/min (11/11/22 2220)      PRN Meds:   acetaminophen, 650 mg, Q6H PRN  fentanyl citrate (PF), 50 mcg, Q1H PRN  hydrALAZINE, 10 mg, Q6H PRN  HYDROmorphone, 0 5 mg, Q1H PRN  LORazepam, 0 5 mg, Q8H PRN  metoclopramide, 5 mg, Q6H PRN  naloxone, 0 04 mg, Q1MIN PRN  ondansetron, 4 mg, Q6H PRN  oxyCODONE, 10 mg, Q4H PRN  oxyCODONE, 5 mg, Q4H PRN        Invasive Devices Review  Invasive Devices  Report    Peripheral Intravenous Line  Duration           Peripheral IV 11/09/22 Left;Proximal;Upper;Ventral (anterior) Arm 2 days    Peripheral IV 11/10/22 Right;Ventral (anterior) Forearm 1 day          Drain  Duration           Urethral Catheter Latex 16 Fr  4 days                Rationale for remaining devices: IV access  ---------------------------------------------------------------------------------------  Advance Directive and Living Will:      Power of :    POLST:    ---------------------------------------------------------------------------------------  Care Time Delivered:   No Critical Care time spent       ALEX Sellers      Portions of the record may have been created with voice recognition software  Occasional wrong word or "sound a like" substitutions may have occurred due to the inherent limitations of voice recognition software    Read the chart carefully and recognize, using context, where substitutions have occurred

## 2022-11-12 NOTE — PROGRESS NOTES
Patient's repeat ptt was 104, this RN spoke with Sherren Peto  Argatroban is infusing at a very low rate of 0 05 mcg/kg/min which is 0 3 mls/hour, per Sherren Peto continue infusing at this rate and recheck ptt in 4 hours

## 2022-11-12 NOTE — ASSESSMENT & PLAN NOTE
Vitals:    11/11/22 2100 11/11/22 2200 11/11/22 2300 11/12/22 0000   BP: 146/72 136/72 154/70 149/71   BP Location:    Right arm   Pulse: 80 80 80 80   Resp: (!) 32 (!) 38 (!) 40 19   Temp:       TempSrc:       SpO2: 94% 95% 96% 96%   Weight:       Height:       Patient's blood pressure has been running high in the 180s, 190s SBP  Patient's home dose of amlodipine 10 mg, lisinopril 40 mg, metoprolol succinate 25 mg daily  Lisinopril was held due to NYDIA  Plan:  Continue amlodipine 10 mg once daily, continue Coreg 25 mg b i d  Doxazosin 2 mg daily  Continue hydralazine 10 mg every 6 hours p r n   For SBP greater than 180  Hold off home lisinopril per nephrology due to NYDIA

## 2022-11-12 NOTE — ASSESSMENT & PLAN NOTE
Lab Results   Component Value Date    HGBA1C 9 8 (H) 10/25/2022       Recent Labs     11/11/22  0715 11/11/22  1210 11/11/22  1545 11/11/22 2104   POCGLU 146* 142* 161* 141*       Blood Sugar Average: Last 72 hrs:  (P) 169 [id number]   Patient's home medication 60 units glargine at bedtime, Humalog 20 units t i d  Continue glargine 25 unitis    Will increase glargine to 25 units  Pt on CLD

## 2022-11-12 NOTE — PROGRESS NOTES
Critical Care Services- Interval Progress Note   Hannah Steiner 65 y o  female MRN: [id number]  Unit/Bed#: ICU 01 Encounter: [id number]  Assessment and Plan  Pt seen and examined this evening for routine evaluation  Pt reports LE extremity pain and requesting pain medications  B/L blisters noted and mottling  B/l feet with Kerlex gauze  L DP with multiphasic signal  L DP with multiphasic signal  Labs reviewed and pt noted with significant thrombocytopenia platelets of 36 this evening  Pt has been on heparin infusion since 11/1 and was on SQ heparin prior  4T score calculated at 6  Will check peripheral smear for schistocytes, DIC profile, heparin antibody THALIA  Updated vascular surgery resident and reviewed with critical care attending plan to transition to Argatroban  • Diagnosis: Thrombocytopenia  o Plan: Check peripheral smear, DIC, heparin antibody THALIA  o Start Argatroban, discontinue heparin      Upon my evaluation, this patient had a high probability of imminent or life-threatening deterioration due to thrombocytopenia, which required my direct attention, intervention, and personal management  I have personally provided 20 minutes (1900 to 2000) on 11/11/2022 of critical care time, exclusive of procedures, teaching, family meetings, and any prior time recorded by providers other than myself  Time includes review of laboratory data, discussion with consultants, monitoring for potential decompensation    Interventions were performed as documented above    -------------------------------------------------------------------------------------------------------------------------------------  Hemodynamic Monitoring:  Vital Signs:   Temp 99 3  HR 80  /72  SaO2 94% on 3L NC  RR 22    Laboratory   Results from last 7 days   Lab Units 11/11/22  1918 11/11/22  0435 11/10/22  0410 11/09/22  0439 11/08/22  0532 11/07/22  1520 11/07/22  0437 11/06/22  0458 11/05/22  0541   WBC Thousand/uL  --  15 69* 16 06* 14 83* 16 57* 13 88* 8 74 8 87 9 62   HEMOGLOBIN g/dL  --  8 2* 8 7* 8 6* 8 8* 8 7* 7 0* 7 8* 8 0*   HEMATOCRIT %  --  26 4* 28 1* 28 2* 28 7* 27 2* 22 8* 25 7* 25 8*   PLATELETS Thousands/uL 36* 40* 60* 83* 105* 157 266 266 277   NEUTROS PCT %  --   --   --   --   --   --   --  69 72   BANDS PCT %  --   --   --   --   --  2  --   --   --    MONOS PCT %  --   --   --   --   --   --   --  8 7   MONO PCT %  --   --   --   --   --  3*  --   --   --      Results from last 7 days   Lab Units 11/11/22  0435 11/10/22  0410 11/09/22  0439 11/08/22  0532 11/07/22  1520 11/07/22  0437 11/06/22  0458   SODIUM mmol/L 139 140 141 139 140 137 138   POTASSIUM mmol/L 3 3* 3 5 3 5 3 6 4 3 3 8 3 8   CHLORIDE mmol/L 103 103 105 104 104 100 99   CO2 mmol/L 28 28 26 24 24 28 30   ANION GAP mmol/L 8 9 10 11 12 9 9   BUN mg/dL 18 15 17 19 19 19 19   CREATININE mg/dL 1 00 0 97 1 02 1 20 1 21 1 40* 1 37*   CALCIUM mg/dL 7 8* 8 0* 7 8* 7 4* 7 5* 8 2* 8 6   GLUCOSE RANDOM mg/dL 141* 130 158* 216* 207* 135 167*   ALT U/L  --   --   --   --  23  --   --    AST U/L  --   --   --   --  42  --   --    ALK PHOS U/L  --   --   --   --  209*  --   --    ALBUMIN g/dL  --   --   --   --  1 8*  --   --    TOTAL BILIRUBIN mg/dL  --   --   --   --  1 53*  --   --      Results from last 7 days   Lab Units 11/07/22  1520   MAGNESIUM mg/dL 1 7   PHOSPHORUS mg/dL 5 6*      Results from last 7 days   Lab Units 11/11/22  1918 11/11/22  1435 11/11/22  0730 11/11/22  0032 11/10/22  1108 11/10/22  0410 11/09/22  1016   INR  3 70*  --   --   --   --   --   --    PTT seconds 86* 84* 101* 104* 94* 112* 84*                ABG:  Results from last 7 days   Lab Units 11/10/22  2031   PH ART  7 458*   PCO2 ART mm Hg 35 3*   PO2 ART mm Hg 73 3*   HCO3 ART mmol/L 24 4   BASE EXC ART mmol/L 0 7   ABG SOURCE  Radial, Left     VBG:  Results from last 7 days   Lab Units 11/10/22  2031   ABG SOURCE  Radial, Left           Micro        Diagnostic Imaging / Data:    Medications:  Current Facility-Administered Medications   Medication Dose Route Frequency   • acetaminophen (TYLENOL) tablet 650 mg  650 mg Oral Q6H PRN   • acetaminophen (TYLENOL) tablet 650 mg  650 mg Oral Q8H [address]   • amLODIPine (NORVASC) tablet 10 mg  10 mg Oral Daily   • atorvastatin (LIPITOR) tablet 40 mg  40 mg Oral Daily With Dinner   • carvedilol (COREG) tablet 25 mg  25 mg Oral BID With Meals   • clopidogrel (PLAVIX) tablet 75 mg  75 mg Oral Daily   • doxazosin (CARDURA) tablet 2 mg  2 mg Oral Daily   • fentanyl citrate (PF) 100 MCG/2ML 50 mcg  50 mcg Intravenous Q1H PRN   • gabapentin (NEURONTIN) capsule 200 mg  200 mg Oral BID   • heparin (porcine) 25,000 units in 0 45% NaCl 250 mL infusion (premix)  3-30 Units/kg/hr (Order-Specific) Intravenous Titrated   • heparin (porcine) injection 4,400 Units  4,400 Units Intravenous Q1H PRN   • heparin (porcine) injection 8,800 Units  8,800 Units Intravenous Q1H PRN   • hydrALAZINE (APRESOLINE) injection 10 mg  10 mg Intravenous Q6H PRN   • HYDROmorphone (DILAUDID) injection 0 5 mg  0 5 mg Intravenous Q1H PRN   • insulin glargine (LANTUS) subcutaneous injection 25 Units 0 25 mL  25 Units Subcutaneous HS   • insulin lispro (HumaLOG) 100 units/mL subcutaneous injection 2-12 Units  2-12 Units Subcutaneous TID AC   • LORazepam (ATIVAN) tablet 0 5 mg  0 5 mg Oral Q8H PRN   • metoclopramide (REGLAN) injection 5 mg  5 mg Intravenous Q6H PRN   • naloxone (NARCAN) 0 04 mg/mL syringe 0 04 mg  0 04 mg Intravenous Q1MIN PRN   • nystatin (MYCOSTATIN) powder   Topical BID   • ondansetron (ZOFRAN) injection 4 mg  4 mg Intravenous Q6H PRN   • oxyCODONE (ROXICODONE) immediate release tablet 10 mg  10 mg Oral Q4H PRN   • oxyCODONE (ROXICODONE) IR tablet 5 mg  5 mg Oral Q4H PRN   • pantoprazole (PROTONIX) EC tablet 40 mg  40 mg Oral Early Morning   • QUEtiapine (SEROquel) tablet 25 mg  25 mg Oral HS   • sertraline (ZOLOFT) tablet 150 mg  150 mg Oral Daily       SIGNATURE: Syd Reasons, CRNP    Portions of the record may have been created with voice recognition software  Occasional wrong word or "sound a like" substitutions may have occurred due to the inherent limitations of voice recognition software    Read the chart carefully and recognize, using context, where substitutions have occurred

## 2022-11-12 NOTE — PLAN OF CARE
Problem: Potential for Falls  Goal: Patient will remain free of falls  Description: INTERVENTIONS:  - Educate patient/family on patient safety including physical limitations  - Instruct patient to call for assistance with activity   - Consult OT/PT to assist with strengthening/mobility   - Keep Call bell within reach  - Keep bed low and locked with side rails adjusted as appropriate  - Keep care items and personal belongings within reach  - Initiate and maintain comfort rounds  - Make Fall Risk Sign visible to staff  - Offer Toileting every 2 Hours, in advance of need  - Initiate/Maintain bed  chair alarm  - Obtain necessary fall risk management equipment: bed chair alarm, call bell, gripper sock, walker, bedside commode  - Apply yellow socks and bracelet for high fall risk patients  - Consider moving patient to room near nurses station  Outcome: Progressing     Problem: MOBILITY - ADULT  Goal: Maintain or return to baseline ADL function  Description: INTERVENTIONS:  -  Assess patient's ability to carry out ADLs; assess patient's baseline for ADL function and identify physical deficits which impact ability to perform ADLs (bathing, care of mouth/teeth, toileting, grooming, dressing, etc )  - Assess/evaluate cause of self-care deficits   - Assess range of motion  - Assess patient's mobility; develop plan if impaired  - Assess patient's need for assistive devices and provide as appropriate  - Encourage maximum independence but intervene and supervise when necessary  - Involve family in performance of ADLs  - Assess for home care needs following discharge   - Consider OT consult to assist with ADL evaluation and planning for discharge  - Provide patient education as appropriate  Outcome: Progressing  Goal: Maintains/Returns to pre admission functional level  Description: INTERVENTIONS:  - Perform BMAT or MOVE assessment daily    - Set and communicate daily mobility goal to care team and patient/family/caregiver     - Collaborate with rehabilitation services on mobility goals if consulted  - Assist patient in repositioning every 2 hours  - Dangle patient 3 times a day  - Stand patient 3 times a day  - Out of bed to chair as tolerated  - Out of bed for meals  - Out of bed for toileting  - Record patient progress and toleration of activity level   Outcome: Progressing     Problem: PAIN - ADULT  Goal: Verbalizes/displays adequate comfort level or baseline comfort level  Description: Interventions:  - Encourage patient to monitor pain and request assistance  - Assess pain using appropriate pain scale  - Administer analgesics based on type and severity of pain and evaluate response  - Implement non-pharmacological measures as appropriate and evaluate response  - Consider cultural and social influences on pain and pain management  - Notify physician/advanced practitioner if interventions unsuccessful or patient reports new pain  Outcome: Progressing     Problem: SAFETY ADULT  Goal: Patient will remain free of falls  Description: INTERVENTIONS:  - Educate patient/family on patient safety including physical limitations  - Instruct patient to call for assistance with activity   - Consult OT/PT to assist with strengthening/mobility   - Keep Call bell within reach  - Keep bed low and locked with side rails adjusted as appropriate  - Keep care items and personal belongings within reach  - Initiate and maintain comfort rounds  - Make Fall Risk Sign visible to staff  - Offer Toileting every 2 Hours, in advance of need  - Initiate/Maintain bed  chair alarm  - Obtain necessary fall risk management equipment: bed chair alarm, call bell, gripper sock, walker, bedside commode  - Apply yellow socks and bracelet for high fall risk patients  - Consider moving patient to room near nurses station  Outcome: Progressing  Goal: Maintain or return to baseline ADL function  Description: INTERVENTIONS:  -  Assess patient's ability to carry out ADLs; assess patient's baseline for ADL function and identify physical deficits which impact ability to perform ADLs (bathing, care of mouth/teeth, toileting, grooming, dressing, etc )  - Assess/evaluate cause of self-care deficits   - Assess range of motion  - Assess patient's mobility; develop plan if impaired  - Assess patient's need for assistive devices and provide as appropriate  - Encourage maximum independence but intervene and supervise when necessary  - Involve family in performance of ADLs  - Assess for home care needs following discharge   - Consider OT consult to assist with ADL evaluation and planning for discharge  - Provide patient education as appropriate  Outcome: Progressing  Goal: Maintains/Returns to pre admission functional level  Description: INTERVENTIONS:  - Perform BMAT or MOVE assessment daily    - Set and communicate daily mobility goal to care team and patient/family/caregiver  - Collaborate with rehabilitation services on mobility goals if consulted  - Assist patient in repositioning every 2 hours    - Dangle patient 3 times a day  - Stand patient 3 times a day  - Out of bed to chair as tolerated  - Out of bed for meals  - Out of bed for toileting  - Record patient progress and toleration of activity level   Outcome: Progressing     Problem: SKIN/TISSUE INTEGRITY - ADULT  Goal: Skin Integrity remains intact(Skin Breakdown Prevention)  Description: Assess:  -Perform Nicanor assessment every shift  -Clean and moisturize skin every shift  -Inspect skin when repositioning, toileting, and assisting with ADLS  -Assess under medical devices   -Assess extremities for adequate circulation and sensation     Bed Management:  -Have minimal linens on bed & keep smooth, unwrinkled  -Change linens as needed when moist or perspiring  -Avoid sitting or lying in one position for more than 2 hours while in bed    Toileting:  -Offer bedside commode  -Assess for incontinence every 2 hours  -Use incontinent care products after each incontinent episode     Activity:  -Mobilize patient 3 times a day  -Encourage or provide ROM exercises   -Turn and reposition patient every 2 Hours  -Use appropriate equipment to lift or move patient in bed  -Instruct/ Assist with weight shifting every 15 minutes when out of bed in chair  -Consider limitation of chair time 1 hour intervals    Skin Care:  -Avoid use of baby powder, tape, friction and shearing, hot water or constrictive clothing  -Relieve pressure over bony prominences using waffle cushion when in chair  -Do not massage red bony areas    Outcome: Not Progressing  Goal: Incision(s), wounds(s) or drain site(s) healing without S/S of infection  Description: INTERVENTIONS  - Assess and document dressing, incision, wound bed, drain sites and surrounding tissue  - Provide patient and family education  - Perform skin care/dressing changes everyday as ordered  Outcome: Progressing  Goal: Pressure injury heals and does not worsen  Description: Interventions:  - Implement low air loss mattress or specialty surface (Criteria met)  - Apply silicone foam dressing  - Instruct/assist with weight shifting every 15 minutes when in chair   - Limit chair time to 1 hour intervals  - Use special pressure reducing interventions such as waffle cushion when in chair   - Perform passive or active ROM   - Turn and reposition patient & offload bony prominences every 2 hours   - Utilize friction reducing device or surface for transfers   - Consider consults to  interdisciplinary teams such as wound care, PT/OT  - Use incontinent care products after each incontinent episode   - Consider nutrition services referral as needed  Outcome: Progressing     Problem: CARDIOVASCULAR - ADULT  Goal: Maintains optimal cardiac output and hemodynamic stability  Description: INTERVENTIONS:  - Monitor I/O, vital signs and rhythm  - Monitor for S/S and trends of decreased cardiac output  - Administer and titrate ordered vasoactive medications to optimize hemodynamic stability  - Assess quality of pulses, skin color and temperature  - Assess for signs of decreased coronary artery perfusion  - Instruct patient to report change in severity of symptoms  Outcome: Progressing     Problem: RESPIRATORY - ADULT  Goal: Achieves optimal ventilation and oxygenation  Description: INTERVENTIONS:  - Assess for changes in respiratory status  - Assess for changes in mentation and behavior  - Position to facilitate oxygenation and minimize respiratory effort  - Oxygen administered by appropriate delivery if ordered  - Initiate smoking cessation education as indicated  - Encourage broncho-pulmonary hygiene including cough, deep breathe, Incentive Spirometry  - Assess the need for suctioning and aspirate as needed  - Assess and instruct to report SOB or any respiratory difficulty  - Respiratory Therapy support as indicated  Outcome: Progressing

## 2022-11-12 NOTE — PROGRESS NOTES
Progress Note - Vascular Surgery  : [facility] Vascular Surgery  on Arlin Varghese 65 y o  female MRN: [id number]  Unit/Bed#: ICU 01 Encounter: [id number]    Assessment:  65 y o  female with PAD s/p multiple BLE endovascular interventions and unfortunately likely suffered atheroembolic events to BLE       Plan:  Heparin gtt / Plavix  Continue neurovascular checks to BLE  Hemodynamic optimization  Monitor BLE for signs of ischemia / demarcation      Subjective/Objective     Subjective: Sleepy but arousale      Objective:     Pulse exam:  R AT strong multiphasic, foot cool  L DP strong multiphasic, foot warm    Patient does not wiggle toes to command     BL feet and BLE with some mottling    Physical Exam:  GEN: NAD, appears deconditioned  HEENT: MMM  CV: RRR  Lung: Normal effort  Ab: Soft, ND/NT   Neuro: Sleepy but arousable        Vitals:  /64   Pulse 82   Temp 99 9 °F (37 7 °C) (Oral)   Resp 20   Ht 5' 3 75" (1 619 m)   Wt 116 kg (255 lb 11 7 oz)   SpO2 93%   BMI 44 24 kg/m²     I/Os:  I/O last 3 completed shifts: In: 657 5 [I V :607 5; IV Piggyback:50]  Out: 0230 [Urine:2225]  I/O this shift:  In: 2 6 [I V :2 6]  Out: 900 [Urine:900]    Lab Results and Cultures:   Lab Results   Component Value Date    WBC 15 36 (H) 11/12/2022    HGB 7 8 (L) 11/12/2022    HCT 24 9 (L) 11/12/2022    MCV 89 11/12/2022    PLT 41 (LL) 11/12/2022     Lab Results   Component Value Date    CALCIUM 7 9 (L) 11/12/2022    K 3 4 (L) 11/12/2022    CO2 29 11/12/2022     11/12/2022    BUN 17 11/12/2022    CREATININE 1 03 11/12/2022     Lab Results   Component Value Date    INR 3 70 (H) 11/11/2022    INR 1 14 11/01/2022    INR 1 03 10/31/2022    PROTIME 36 4 (H) 11/11/2022    PROTIME 14 6 (H) 11/01/2022    PROTIME 13 5 10/31/2022        Blood Culture:   Lab Results   Component Value Date    BLOODCX No Growth After 5 Days   10/21/2022   ,   Urinalysis:   Lab Results   Component Value Date    COLORU Light Yellow 10/21/2022    CLARITYU Turbid 10/21/2022    SPECGRAV >=1 030 10/21/2022    PHUR 6 0 10/21/2022    PHUR 6 5 02/23/2018    LEUKOCYTESUR Negative 10/21/2022    NITRITE Negative 10/21/2022    GLUCOSEU 250 (1/4%) (A) 10/21/2022    KETONESU Negative 10/21/2022    BILIRUBINUR Negative 10/21/2022    BLOODU Large (A) 10/21/2022   ,   Urine Culture:   Lab Results   Component Value Date    URINECX >100,000 cfu/ml Escherichia coli (A) 10/21/2022   ,   Wound Culure: No results found for: WOUNDCULT    Medications:  Current Facility-Administered Medications   Medication Dose Route Frequency   • acetaminophen (TYLENOL) tablet 650 mg  650 mg Oral Q6H PRN   • acetaminophen (TYLENOL) tablet 650 mg  650 mg Oral Q8H [address]   • amLODIPine (NORVASC) tablet 10 mg  10 mg Oral Daily   • argatroban infusion (premix)  0 1-3 mcg/kg/min Intravenous Titrated   • atorvastatin (LIPITOR) tablet 40 mg  40 mg Oral Daily With Dinner   • carvedilol (COREG) tablet 25 mg  25 mg Oral BID With Meals   • clopidogrel (PLAVIX) tablet 75 mg  75 mg Oral Daily   • doxazosin (CARDURA) tablet 2 mg  2 mg Oral Daily   • fentanyl citrate (PF) 100 MCG/2ML 50 mcg  50 mcg Intravenous Q1H PRN   • gabapentin (NEURONTIN) capsule 200 mg  200 mg Oral BID   • hydrALAZINE (APRESOLINE) injection 10 mg  10 mg Intravenous Q6H PRN   • HYDROmorphone (DILAUDID) injection 0 5 mg  0 5 mg Intravenous Q1H PRN   • insulin glargine (LANTUS) subcutaneous injection 25 Units 0 25 mL  25 Units Subcutaneous HS   • insulin lispro (HumaLOG) 100 units/mL subcutaneous injection 2-12 Units  2-12 Units Subcutaneous TID AC   • LORazepam (ATIVAN) tablet 0 5 mg  0 5 mg Oral Q8H PRN   • metoclopramide (REGLAN) injection 5 mg  5 mg Intravenous Q6H PRN   • naloxone (NARCAN) 0 04 mg/mL syringe 0 04 mg  0 04 mg Intravenous Q1MIN PRN   • nystatin (MYCOSTATIN) powder   Topical BID   • ondansetron (ZOFRAN) injection 4 mg  4 mg Intravenous Q6H PRN   • oxyCODONE (ROXICODONE) immediate release tablet 10 mg  10 mg Oral Q4H PRN   • oxyCODONE (ROXICODONE) IR tablet 5 mg  5 mg Oral Q4H PRN   • pantoprazole (PROTONIX) EC tablet 40 mg  40 mg Oral Early Morning   • polyethylene glycol (MIRALAX) packet 17 g  17 g Oral Daily PRN   • QUEtiapine (SEROquel) tablet 25 mg  25 mg Oral HS   • sertraline (ZOLOFT) tablet 150 mg  150 mg Oral Daily           Aleyda Esquivel MD  11/12/2022

## 2022-11-12 NOTE — PROGRESS NOTES
NEPHROLOGY PROGRESS NOTE   Alexi Cruz 65 y o  female MRN: [id number]  Unit/Bed#: ICU 01 Encounter: [id number]  Reason for Consult: NYDIA    ASSESSMENT/PLAN:  1  Acute Kidney Injury- secondary to multiple contrast exposures (10/31, 11/1, 11/7, 11/9)  - creatinine improved back to baseline of 1 0 today  - off IVF  - given IV lasix Friday, will redose today  - urine output acceptable  - avoid further IV contrast, avoid hypotension, avoid nephrotoxins  2  Proteinuria- likely etiology is due to diabetic nephropathy  - outpatient follow up needed  - holding lisinopril due to recent NYDIA  3  Hypokalemia- will replete today  4  Hypertension- BP overall acceptable  - holding lisinopril  - monitor with IV lasix today  - continue amlodipine 10 mg daily, Coreg 25 mg b i d , doxazosin 2 mg daily  5  PVD- s/p LE angiogram, right CFA balloon angioplasty, stent placement, atherectomy  6  Volume overload- IV lasix given Friday and will give again today    Disposition:  Not stable for discharge  lasix 40mg IV x1 today  Discussed with ICU team    SUBJECTIVE:  Patient feeling well but per nurse, patient is slightly delirious and not herself  She hasn't slept well the past 2 nights  She denies SOB      OBJECTIVE:  Current Weight: Weight - Scale: 116 kg (255 lb 11 7 oz)  Vitals:    11/12/22 0500 11/12/22 0600 11/12/22 0800 11/12/22 0839   BP: 161/70 168/67 121/62 140/74   BP Location:       Pulse: 80 80 80 85   Resp: 22 19 18    Temp:   100 3 °F (37 9 °C)    TempSrc:       SpO2: 94% 95% 95%    Weight:       Height:           Intake/Output Summary (Last 24 hours) at 11/12/2022 0848  Last data filed at 11/12/2022 0800  Gross per 24 hour   Intake 258 25 ml   Output 1625 ml   Net -1366 75 ml     General: NAD  Skin: no rash  Eyes: anicteric  ENMT: mm moist  Neck: no masses  Respiratory: CTAB  Cardiac: RRR  Extremities: + bilateral edema  GI: slightly distended, soft  Neuro: alert awake  Psych: depressed    Medications:    Current Facility-Administered Medications:   •  acetaminophen (TYLENOL) tablet 650 mg, 650 mg, Oral, Q6H PRN, Griselda Brenner MD, 650 mg at 11/10/22 2028  •  acetaminophen (TYLENOL) tablet 650 mg, 650 mg, Oral, Q8H [address], Shaun Marilee Buerger, DO, 650 mg at 11/12/22 0502  •  amLODIPine (NORVASC) tablet 10 mg, 10 mg, Oral, Daily, Griselda Brnener MD, 10 mg at 11/12/22 0840  •  argatroban infusion (premix), 0 1-3 mcg/kg/min, Intravenous, Titrated, Everlena Gilford, CRNP, Last Rate: 0 7 mL/hr at 11/12/22 0530, 0 1 mcg/kg/min at 11/12/22 0530  •  atorvastatin (LIPITOR) tablet 40 mg, 40 mg, Oral, Daily With Lynette Rodarte MD, 40 mg at 11/11/22 1539  •  carvedilol (COREG) tablet 25 mg, 25 mg, Oral, BID With Meals, Griselda Brenner MD, 25 mg at 11/12/22 0597  •  clopidogrel (PLAVIX) tablet 75 mg, 75 mg, Oral, Daily, Griselda Brenner MD, 75 mg at 11/12/22 4689  •  doxazosin (CARDURA) tablet 2 mg, 2 mg, Oral, Daily, Tasia Webb MD, 2 mg at 11/12/22 7391  •  fentanyl citrate (PF) 100 MCG/2ML 50 mcg, 50 mcg, Intravenous, Q1H PRN, Griselda Brenner MD  •  gabapentin (NEURONTIN) capsule 200 mg, 200 mg, Oral, BID, Griselda Brenner MD, 200 mg at 11/12/22 4478  •  hydrALAZINE (APRESOLINE) injection 10 mg, 10 mg, Intravenous, Q6H PRN, Griselda Brenner MD, 10 mg at 11/11/22 0156  •  HYDROmorphone (DILAUDID) injection 0 5 mg, 0 5 mg, Intravenous, Q1H PRN, Danielle aBi DO, 0 5 mg at 11/12/22 5905  •  insulin glargine (LANTUS) subcutaneous injection 25 Units 0 25 mL, 25 Units, Subcutaneous, HS, Griselda Brenner MD, 25 Units at 11/11/22 2105  •  insulin lispro (HumaLOG) 100 units/mL subcutaneous injection 2-12 Units, 2-12 Units, Subcutaneous, TID AC, 2 Units at 11/11/22 1546 **AND** Fingerstick Glucose (POCT), , , TID AC, Griselda Brenner MD  •  LORazepam (ATIVAN) tablet 0 5 mg, 0 5 mg, Oral, Q8H PRN, Griselda Brenner MD, 0 5 mg at 11/10/22 2151  • metoclopramide (REGLAN) injection 5 mg, 5 mg, Intravenous, Q6H PRN, Katiuska Taveras MD, 5 mg at 10/23/22 5904  •  naloxone (NARCAN) 0 04 mg/mL syringe 0 04 mg, 0 04 mg, Intravenous, Q1MIN PRN, Aleatha Long, DO  •  nystatin (MYCOSTATIN) powder, , Topical, BID, Katiuska Taveras MD, Given at 11/12/22 6538  •  ondansetron [facility]) injection 4 mg, 4 mg, Intravenous, Q6H PRN, Katiuska Taveras MD, 4 mg at 11/08/22 4404  •  oxyCODONE (ROXICODONE) immediate release tablet 10 mg, 10 mg, Oral, Q4H PRN, Aleatha Long, DO, 10 mg at 11/12/22 0502  •  oxyCODONE (ROXICODONE) IR tablet 5 mg, 5 mg, Oral, Q4H PRN, Aleatha Long, DO, 5 mg at 11/10/22 2151  •  pantoprazole (PROTONIX) EC tablet 40 mg, 40 mg, Oral, Early Morning, Katiuska Taveras MD, 40 mg at 11/12/22 0502  •  QUEtiapine (SEROquel) tablet 25 mg, 25 mg, Oral, HS, [facility], DO, 25 mg at 11/11/22 2105  •  sertraline (ZOLOFT) tablet 150 mg, 150 mg, Oral, Daily, Aleatha Long, DO, 150 mg at 11/12/22 0345    Laboratory Results:  Results from last 7 days   Lab Units 11/12/22  0424 11/11/22  1918 11/11/22  0435 11/10/22  0410 11/09/22  0439 11/08/22  0532 11/07/22  1520 11/07/22  0437   WBC Thousand/uL 15 36*  --  15 69* 16 06* 14 83* 16 57* 13 88* 8 74   HEMOGLOBIN g/dL 7 8*  --  8 2* 8 7* 8 6* 8 8* 8 7* 7 0*   HEMATOCRIT % 24 9*  --  26 4* 28 1* 28 2* 28 7* 27 2* 22 8*   PLATELETS Thousands/uL 41* 36* 40* 60* 83* 105* 157 266   POTASSIUM mmol/L 3 4*  --  3 3* 3 5 3 5 3 6 4 3 3 8   CHLORIDE mmol/L 103  --  103 103 105 104 104 100   CO2 mmol/L 29  --  28 28 26 24 24 28   BUN mg/dL 17  --  18 15 17 19 19 19   CREATININE mg/dL 1 03  --  1 00 0 97 1 02 1 20 1 21 1 40*   CALCIUM mg/dL 7 9*  --  7 8* 8 0* 7 8* 7 4* 7 5* 8 2*   MAGNESIUM mg/dL  --   --   --   --   --   --  1 7  --    PHOSPHORUS mg/dL  --   --   --   --   --   --  5 6*  --      I have personally reviewed the blood work as stated above and in my note    I have personally reviewed critical care note

## 2022-11-12 NOTE — ASSESSMENT & PLAN NOTE
Lab Results   Component Value Date    HGB 8 2 (L) 11/11/2022    HGB 8 7 (L) 11/10/2022    HGB 8 6 (L) 11/09/2022     Hemoglobin fluctuating with baseline around 10  S/p 1 unit transfused 11/3/2022, H/H improved and has remained stable ever since  FOBT neg, no current source of bleeding, groin without hematoma  Continue to monitor H/H  Transfuse for Hgb less than 7

## 2022-11-12 NOTE — ASSESSMENT & PLAN NOTE
Significant reduction in platelet > 50 % in the past 4 days  Patient started on Heparin infusion on 11/1 and SQ heparin prior  4Ts score 5- intermediate probability for HIIT  Ruled out DIC/DITP/ less likely infectious origin  Plan  DIC Screen- FDP, D Dimers, hemolysis screen, fibrinogen  HIT platelet antibody  Antithrombin III  Heparin gtt discontinued and Argatroban initiated

## 2022-11-12 NOTE — PROGRESS NOTES
Progress Note - General Surgery   Erum Long 65 y o  female MRN: [id number]  Unit/Bed#: ICU 01 Encounter: [id number]    Assessment:  64yo F with aortoiliac and infrainguinal arterial occlusive disease s/p multiple endovascular interventions (BLE Agram with R ALLISON stent aspiration thrombectomy / PTA / repeat stent placement, L SFA stent aspiration thrombectomy / PTA / repeat stent placement, L Pop POBA, L AT instillation of tPA)    Plan:  - holding heparin gtt in setting of possible HIT, cont argatroban   - appreciate care per ICU team  - appreciate podiatry recs  - cont plavix  - cont neurovascular checks  - encourage OOB    Subjective/Objective   Subjective:   No acute events overnight  Was focused on right elbow pain this morning from poor positioning  Denies lower extremity pain  Objective:     Blood pressure (!) 102/40, pulse 80, temperature 100 3 °F (37 9 °C), resp  rate (!) 24, height 5' 3 75" (1 619 m), weight 116 kg (255 lb 11 7 oz), SpO2 95 %, not currently breastfeeding  ,Body mass index is 44 24 kg/m²  Intake/Output Summary (Last 24 hours) at 11/12/2022 5482  Last data filed at 11/12/2022 0800  Gross per 24 hour   Intake 208 25 ml   Output 1625 ml   Net -1416 75 ml       Invasive Devices  Report    Peripheral Intravenous Line  Duration           Peripheral IV 11/09/22 Left;Proximal;Upper;Ventral (anterior) Arm 2 days    Peripheral IV 11/10/22 Right;Ventral (anterior) Forearm 2 days          Drain  Duration           Urethral Catheter Latex 16 Fr  5 days                Physical Exam:  General: No acute distress  Neuro: alert and oriented  HEENT: moist mucous membranes  CV: Well perfused, regular rate and rhythm  Lungs: Normal work of breathing, no increased respiratory effort  Abdomen: Soft, non-tender, non-distended  Extremities: No edema, clubbing or cyanosis, bilateral lower extremities sensory intact, very minimal motor functions  Cold and mottled feet bilaterally  RLE with bullae    Skin: Warm, dry        Kathrin Quigley MD  General Surgery PGY1

## 2022-11-13 ENCOUNTER — APPOINTMENT (INPATIENT)
Dept: RADIOLOGY | Facility: HOSPITAL | Age: 62
DRG: 853 | End: 2022-11-13
Payer: COMMERCIAL

## 2022-11-13 ENCOUNTER — APPOINTMENT (INPATIENT)
Dept: CT IMAGING | Facility: HOSPITAL | Age: 62
DRG: 853 | End: 2022-11-13
Payer: COMMERCIAL

## 2022-11-13 PROBLEM — I63.9 CVA (CEREBRAL VASCULAR ACCIDENT) (HCC): Status: ACTIVE | Noted: 2022-11-13

## 2022-11-13 PROBLEM — N39.0 UTI (URINARY TRACT INFECTION): Status: ACTIVE | Noted: 2022-11-13

## 2022-11-13 LAB
ALBUMIN SERPL BCP-MCNC: 1.5 G/DL (ref 3.5–5)
ALP SERPL-CCNC: 232 U/L (ref 46–116)
ALT SERPL W P-5'-P-CCNC: 53 U/L (ref 12–78)
ANION GAP SERPL CALCULATED.3IONS-SCNC: 7 MMOL/L (ref 4–13)
APTT PPP: 95 SECONDS (ref 23–37)
APTT PPP: 97 SECONDS (ref 23–37)
APTT PPP: 98 SECONDS (ref 23–37)
AST SERPL W P-5'-P-CCNC: 114 U/L (ref 5–45)
BASOPHILS # BLD AUTO: 0.02 THOUSANDS/ÂΜL (ref 0–0.1)
BASOPHILS NFR BLD AUTO: 0 % (ref 0–1)
BILIRUB SERPL-MCNC: 0.67 MG/DL (ref 0.2–1)
BUN SERPL-MCNC: 16 MG/DL (ref 5–25)
C3 SERPL-MCNC: 133 MG/DL (ref 90–180)
C4 SERPL-MCNC: 32 MG/DL (ref 10–40)
CALCIUM ALBUM COR SERPL-MCNC: 9.8 MG/DL (ref 8.3–10.1)
CALCIUM SERPL-MCNC: 7.8 MG/DL (ref 8.3–10.1)
CHLORIDE SERPL-SCNC: 102 MMOL/L (ref 96–108)
CO2 SERPL-SCNC: 31 MMOL/L (ref 21–32)
CREAT SERPL-MCNC: 1.08 MG/DL (ref 0.6–1.3)
CREAT UR-MCNC: 84.6 MG/DL
EOSINOPHIL # BLD AUTO: 0.11 THOUSAND/ÂΜL (ref 0–0.61)
EOSINOPHIL NFR BLD AUTO: 1 % (ref 0–6)
ERYTHROCYTE [DISTWIDTH] IN BLOOD BY AUTOMATED COUNT: 15.5 % (ref 11.6–15.1)
GFR SERPL CREATININE-BSD FRML MDRD: 55 ML/MIN/1.73SQ M
GLUCOSE SERPL-MCNC: 104 MG/DL (ref 65–140)
GLUCOSE SERPL-MCNC: 85 MG/DL (ref 65–140)
GLUCOSE SERPL-MCNC: 86 MG/DL (ref 65–140)
GLUCOSE SERPL-MCNC: 89 MG/DL (ref 65–140)
HCT VFR BLD AUTO: 22.8 % (ref 34.8–46.1)
HGB BLD-MCNC: 7.2 G/DL (ref 11.5–15.4)
IMM GRANULOCYTES # BLD AUTO: 0.19 THOUSAND/UL (ref 0–0.2)
IMM GRANULOCYTES NFR BLD AUTO: 1 % (ref 0–2)
LACTATE SERPL-SCNC: 1 MMOL/L (ref 0.5–2)
LYMPHOCYTES # BLD AUTO: 0.72 THOUSANDS/ÂΜL (ref 0.6–4.47)
LYMPHOCYTES NFR BLD AUTO: 5 % (ref 14–44)
MCH RBC QN AUTO: 27.6 PG (ref 26.8–34.3)
MCHC RBC AUTO-ENTMCNC: 31.6 G/DL (ref 31.4–37.4)
MCV RBC AUTO: 87 FL (ref 82–98)
MONOCYTES # BLD AUTO: 1.3 THOUSAND/ÂΜL (ref 0.17–1.22)
MONOCYTES NFR BLD AUTO: 9 % (ref 4–12)
NEUTROPHILS # BLD AUTO: 12.66 THOUSANDS/ÂΜL (ref 1.85–7.62)
NEUTS SEG NFR BLD AUTO: 84 % (ref 43–75)
NRBC BLD AUTO-RTO: 0 /100 WBCS
PLATELET # BLD AUTO: 67 THOUSANDS/UL (ref 149–390)
PMV BLD AUTO: 10.4 FL (ref 8.9–12.7)
POTASSIUM SERPL-SCNC: 3.1 MMOL/L (ref 3.5–5.3)
PROCALCITONIN SERPL-MCNC: 0.48 NG/ML
PROT SERPL-MCNC: 5.9 G/DL (ref 6.4–8.4)
PROT UR-MCNC: 468 MG/DL
PROT/CREAT UR: 5.53 MG/G{CREAT} (ref 0–0.1)
RBC # BLD AUTO: 2.61 MILLION/UL (ref 3.81–5.12)
SODIUM SERPL-SCNC: 140 MMOL/L (ref 135–147)
WBC # BLD AUTO: 15 THOUSAND/UL (ref 4.31–10.16)

## 2022-11-13 PROCEDURE — 86160 COMPLEMENT ANTIGEN: CPT | Performed by: NURSE PRACTITIONER

## 2022-11-13 PROCEDURE — 70498 CT ANGIOGRAPHY NECK: CPT

## 2022-11-13 PROCEDURE — 86334 IMMUNOFIX E-PHORESIS SERUM: CPT | Performed by: NURSE PRACTITIONER

## 2022-11-13 PROCEDURE — 70450 CT HEAD/BRAIN W/O DYE: CPT

## 2022-11-13 PROCEDURE — 87154 CUL TYP ID BLD PTHGN 6+ TRGT: CPT | Performed by: NURSE PRACTITIONER

## 2022-11-13 PROCEDURE — 82948 REAGENT STRIP/BLOOD GLUCOSE: CPT

## 2022-11-13 PROCEDURE — 71045 X-RAY EXAM CHEST 1 VIEW: CPT

## 2022-11-13 PROCEDURE — 83605 ASSAY OF LACTIC ACID: CPT | Performed by: NURSE PRACTITIONER

## 2022-11-13 PROCEDURE — 85730 THROMBOPLASTIN TIME PARTIAL: CPT | Performed by: INTERNAL MEDICINE

## 2022-11-13 PROCEDURE — 87040 BLOOD CULTURE FOR BACTERIA: CPT | Performed by: NURSE PRACTITIONER

## 2022-11-13 PROCEDURE — 99255 IP/OBS CONSLTJ NEW/EST HI 80: CPT | Performed by: PSYCHIATRY & NEUROLOGY

## 2022-11-13 PROCEDURE — 83521 IG LIGHT CHAINS FREE EACH: CPT | Performed by: NURSE PRACTITIONER

## 2022-11-13 PROCEDURE — 83516 IMMUNOASSAY NONANTIBODY: CPT | Performed by: NURSE PRACTITIONER

## 2022-11-13 PROCEDURE — 99233 SBSQ HOSP IP/OBS HIGH 50: CPT | Performed by: INTERNAL MEDICINE

## 2022-11-13 PROCEDURE — 85025 COMPLETE CBC W/AUTO DIFF WBC: CPT | Performed by: NURSE PRACTITIONER

## 2022-11-13 PROCEDURE — 84156 ASSAY OF PROTEIN URINE: CPT | Performed by: STUDENT IN AN ORGANIZED HEALTH CARE EDUCATION/TRAINING PROGRAM

## 2022-11-13 PROCEDURE — 99232 SBSQ HOSP IP/OBS MODERATE 35: CPT | Performed by: SURGERY

## 2022-11-13 PROCEDURE — NC001 PR NO CHARGE: Performed by: SURGERY

## 2022-11-13 PROCEDURE — G1004 CDSM NDSC: HCPCS

## 2022-11-13 PROCEDURE — 84145 PROCALCITONIN (PCT): CPT | Performed by: NURSE PRACTITIONER

## 2022-11-13 PROCEDURE — 85730 THROMBOPLASTIN TIME PARTIAL: CPT | Performed by: NURSE PRACTITIONER

## 2022-11-13 PROCEDURE — 82570 ASSAY OF URINE CREATININE: CPT | Performed by: STUDENT IN AN ORGANIZED HEALTH CARE EDUCATION/TRAINING PROGRAM

## 2022-11-13 PROCEDURE — 80053 COMPREHEN METABOLIC PANEL: CPT | Performed by: NURSE PRACTITIONER

## 2022-11-13 PROCEDURE — 70496 CT ANGIOGRAPHY HEAD: CPT

## 2022-11-13 PROCEDURE — 84165 PROTEIN E-PHORESIS SERUM: CPT | Performed by: NURSE PRACTITIONER

## 2022-11-13 PROCEDURE — 99232 SBSQ HOSP IP/OBS MODERATE 35: CPT | Performed by: STUDENT IN AN ORGANIZED HEALTH CARE EDUCATION/TRAINING PROGRAM

## 2022-11-13 PROCEDURE — 99232 SBSQ HOSP IP/OBS MODERATE 35: CPT | Performed by: NURSE PRACTITIONER

## 2022-11-13 RX ORDER — HYDROMORPHONE HCL/PF 1 MG/ML
0.5 SYRINGE (ML) INJECTION EVERY 4 HOURS PRN
Status: DISCONTINUED | OUTPATIENT
Start: 2022-11-13 | End: 2022-11-14

## 2022-11-13 RX ORDER — INSULIN GLARGINE 100 [IU]/ML
15 INJECTION, SOLUTION SUBCUTANEOUS
Status: DISCONTINUED | OUTPATIENT
Start: 2022-11-13 | End: 2022-11-28

## 2022-11-13 RX ORDER — POTASSIUM CHLORIDE 20 MEQ/1
20 TABLET, EXTENDED RELEASE ORAL ONCE
Status: COMPLETED | OUTPATIENT
Start: 2022-11-13 | End: 2022-11-13

## 2022-11-13 RX ORDER — POTASSIUM CHLORIDE 20 MEQ/1
40 TABLET, EXTENDED RELEASE ORAL ONCE
Status: DISCONTINUED | OUTPATIENT
Start: 2022-11-13 | End: 2022-11-14

## 2022-11-13 RX ORDER — POTASSIUM CHLORIDE 20 MEQ/1
40 TABLET, EXTENDED RELEASE ORAL ONCE
Status: COMPLETED | OUTPATIENT
Start: 2022-11-13 | End: 2022-11-13

## 2022-11-13 RX ADMIN — PANTOPRAZOLE SODIUM 40 MG: 40 TABLET, DELAYED RELEASE ORAL at 05:58

## 2022-11-13 RX ADMIN — POTASSIUM CHLORIDE 40 MEQ: 1500 TABLET, EXTENDED RELEASE ORAL at 03:44

## 2022-11-13 RX ADMIN — OXYCODONE HYDROCHLORIDE 10 MG: 10 TABLET ORAL at 00:22

## 2022-11-13 RX ADMIN — HYDROMORPHONE HYDROCHLORIDE 0.5 MG: 1 INJECTION, SOLUTION INTRAMUSCULAR; INTRAVENOUS; SUBCUTANEOUS at 03:39

## 2022-11-13 RX ADMIN — POTASSIUM CHLORIDE 20 MEQ: 1500 TABLET, EXTENDED RELEASE ORAL at 08:20

## 2022-11-13 RX ADMIN — CEFTRIAXONE SODIUM 2000 MG: 10 INJECTION, POWDER, FOR SOLUTION INTRAVENOUS at 02:53

## 2022-11-13 RX ADMIN — HYDROMORPHONE HYDROCHLORIDE 0.5 MG: 1 INJECTION, SOLUTION INTRAMUSCULAR; INTRAVENOUS; SUBCUTANEOUS at 08:40

## 2022-11-13 RX ADMIN — NYSTATIN: 100000 POWDER TOPICAL at 17:13

## 2022-11-13 RX ADMIN — LORAZEPAM 0.5 MG: 0.5 TABLET ORAL at 03:10

## 2022-11-13 RX ADMIN — INSULIN GLARGINE 15 UNITS: 100 INJECTION, SOLUTION SUBCUTANEOUS at 22:27

## 2022-11-13 RX ADMIN — HYDROMORPHONE HYDROCHLORIDE 0.5 MG: 1 INJECTION, SOLUTION INTRAMUSCULAR; INTRAVENOUS; SUBCUTANEOUS at 16:07

## 2022-11-13 RX ADMIN — HYDRALAZINE HYDROCHLORIDE 10 MG: 20 INJECTION, SOLUTION INTRAMUSCULAR; INTRAVENOUS at 23:29

## 2022-11-13 RX ADMIN — IOHEXOL 85 ML: 350 INJECTION, SOLUTION INTRAVENOUS at 17:13

## 2022-11-13 RX ADMIN — HYDROMORPHONE HYDROCHLORIDE 0.5 MG: 1 INJECTION, SOLUTION INTRAMUSCULAR; INTRAVENOUS; SUBCUTANEOUS at 20:42

## 2022-11-13 RX ADMIN — ACETAMINOPHEN 650 MG: 325 TABLET, FILM COATED ORAL at 05:58

## 2022-11-13 NOTE — PROGRESS NOTES
NEPHROLOGY PROGRESS NOTE   Venkatesh Arnold 65 y o  female MRN: [id number]  Unit/Bed#: [facility] [id number] Encounter: [id number]  Reason for Consult: NYDIA    ASSESSMENT/PLAN:  1  Acute Kidney Injury- secondary to multiple contrast exposures (10/31, 11/1, 11/7, 11/9)  - creatinine improved back to baseline of 1 0 today  - off IVF  - s/p IV lasix x2, monitor with bumex  - urine output acceptable  - avoid further IV contrast, avoid hypotension, avoid nephrotoxins  2  Proteinuria- likely etiology is due to diabetic nephropathy  - outpatient follow up needed  - holding lisinopril due to recent NYDIA  3  Hypokalemia- replete again today  4  Hypertension- BP overall acceptable  - holding lisinopril  - monitor with IV lasix today  - continue amlodipine 10 mg daily, Coreg 25 mg b i d , doxazosin 2 mg daily  5  PVD- s/p LE angiogram, right CFA balloon angioplasty, stent placement, atherectomy  6  Volume overload- s/p 2 doses IV lasix  - now on bumex 1mg BID    Disposition:  Continue bumex 1mg BID    SUBJECTIVE:  Patient states she has pain all over  Uncooperative with exam   Moaning  Nurse getting pain medications for her      OBJECTIVE:  Current Weight: Weight - Scale: 116 kg (255 lb 11 7 oz)  Vitals:    11/13/22 0030 11/13/22 0317 11/13/22 0559 11/13/22 0806   BP:  159/70  156/72   BP Location:       Pulse:  85  85   Resp:    (!) 24   Temp:  99 3 °F (37 4 °C)  98 4 °F (36 9 °C)   TempSrc: Oral      SpO2:  94%  92%   Weight:   116 kg (255 lb 11 7 oz)    Height:           Intake/Output Summary (Last 24 hours) at 11/13/2022 0846  Last data filed at 11/13/2022 0602  Gross per 24 hour   Intake 378 28 ml   Output 3350 ml   Net -2971 72 ml     General: NAD  Skin: no rash  Eyes: anicteric  ENMT: mm moist  Neck: no masses  Respiratory: decreased, poor effort  Cardiac: RRR  Extremities: + bilateral edema  GI: soft nt nd  Neuro: alert awake  Psych: mood and affect appropriate    Medications:    Current Facility-Administered Medications: •  acetaminophen (TYLENOL) tablet 650 mg, 650 mg, Oral, Q6H PRN, Shae Juarez MD, 650 mg at 11/10/22 2028  •  acetaminophen (TYLENOL) tablet 650 mg, 650 mg, Oral, Q8H [address], Shae Mccauley MD, 650 mg at 11/13/22 0558  •  amLODIPine (NORVASC) tablet 10 mg, 10 mg, Oral, Daily, Shae Mccauley MD, 10 mg at 11/12/22 0840  •  argatroban infusion (premix), 0 1-3 mcg/kg/min, Intravenous, Titrated, ALEX Hinkle, Last Rate: 0 1 mL/hr at 11/12/22 2100, 0 02 mcg/kg/min at 11/12/22 2100  •  atorvastatin (LIPITOR) tablet 40 mg, 40 mg, Oral, Daily With Renee Herbert MD, 40 mg at 11/12/22 1639  •  bumetanide (BUMEX) tablet 1 mg, 1 mg, Oral, BID, ALEX Hinkle, 1 mg at 11/12/22 1741  •  carvedilol (COREG) tablet 25 mg, 25 mg, Oral, BID With Meals, Shae Mccauley MD, 25 mg at 11/12/22 1640  •  cefTRIAXone (ROCEPHIN) 2,000 mg in dextrose 5 % 50 mL IVPB, 2,000 mg, Intravenous, Q24H, ALEX Vazquez, Last Rate: 100 mL/hr at 11/13/22 0253, 2,000 mg at 11/13/22 7909  •  clopidogrel (PLAVIX) tablet 75 mg, 75 mg, Oral, Daily, Shae Mccauley MD, 75 mg at 11/12/22 4525  •  doxazosin (CARDURA) tablet 2 mg, 2 mg, Oral, Daily, Shae Mccauley MD, 2 mg at 11/12/22 1032  •  gabapentin (NEURONTIN) capsule 200 mg, 200 mg, Oral, BID, Shae Mccauley MD, 200 mg at 11/12/22 1741  •  hydrALAZINE (APRESOLINE) injection 10 mg, 10 mg, Intravenous, Q6H PRN, Shae Mccauley MD, 10 mg at 11/11/22 0156  •  HYDROmorphone (DILAUDID) injection 0 5 mg, 0 5 mg, Intravenous, Q4H PRN, ALEX Vazquez, 0 5 mg at 11/13/22 0840  •  insulin glargine (LANTUS) subcutaneous injection 25 Units 0 25 mL, 25 Units, Subcutaneous, HS, Shae Mccauley MD, 25 Units at 11/11/22 2105  •  insulin lispro (HumaLOG) 100 units/mL subcutaneous injection 2-12 Units, 2-12 Units, Subcutaneous, TID AC, 2 Units at 11/11/22 1546 **AND** Fingerstick Glucose (POCT), , , TID AC, Shae Mccauley MD  •  LORazepam (ATIVAN) tablet 0 5 mg, 0 5 mg, Oral, Q8H PRN, Shae Mccauley MD, 0 5 mg at 11/13/22 0310  •  metoclopramide (REGLAN) injection 5 mg, 5 mg, Intravenous, Q6H PRN, Shae Mccauley MD, 5 mg at 10/23/22 8389  •  naloxone (NARCAN) 0 04 mg/mL syringe 0 04 mg, 0 04 mg, Intravenous, Q1MIN PRN, Brando Garza MD  •  nystatin (MYCOSTATIN) powder, , Topical, BID, Shae Quevedo MD, 1 application at 82/51/62 1741  •  ondansetron (ZOFRAN) injection 4 mg, 4 mg, Intravenous, Q6H PRN, Shae Quevedo MD, 4 mg at 11/08/22 8111  •  oxyCODONE (ROXICODONE) immediate release tablet 10 mg, 10 mg, Oral, Q4H PRN, Shae Quevedo MD, 10 mg at 11/13/22 0022  •  oxyCODONE (ROXICODONE) IR tablet 5 mg, 5 mg, Oral, Q4H PRN, Shae Quevedo MD, 5 mg at 11/10/22 2151  •  pantoprazole (PROTONIX) EC tablet 40 mg, 40 mg, Oral, Early Morning, Shae Mccauley MD, 40 mg at 11/13/22 0478  •  polyethylene glycol (MIRALAX) packet 17 g, 17 g, Oral, Daily PRN, Clarance Hodgkins, CRNP  •  potassium chloride (K-DUR,KLOR-CON) CR tablet 40 mEq, 40 mEq, Oral, Once, [facility], PA-C  •  QUEtiapine (SEROquel) tablet 25 mg, 25 mg, Oral, HS, Clarance Hodgkins, CRNP, 25 mg at 11/12/22 4106  •  sertraline (ZOLOFT) tablet 150 mg, 150 mg, Oral, Daily, Clarance Hodgkins, CRNP, 150 mg at 11/12/22 3437    Laboratory Results:  Results from last 7 days   Lab Units 11/13/22  0148 11/12/22  0424 11/11/22  1918 11/11/22  0435 11/10/22  0410 11/09/22  0439 11/08/22  0532 11/07/22  1520   WBC Thousand/uL 15 00* 15 36*  --  15 69* 16 06* 14 83* 16 57* 13 88*   HEMOGLOBIN g/dL 7 2* 7 8*  --  8 2* 8 7* 8 6* 8 8* 8 7*   HEMATOCRIT % 22 8* 24 9*  --  26 4* 28 1* 28 2* 28 7* 27 2*   PLATELETS Thousands/uL 67* 41* 36* 40* 60* 83* 105* 157   POTASSIUM mmol/L 3 1* 3 4*  --  3 3* 3 5 3 5 3 6 4 3   CHLORIDE mmol/L 102 103  --  103 103 105 104 104   CO2 mmol/L 31 29  --  28 28 26 24 24   BUN mg/dL 16 17  --  18 15 17 19 19   CREATININE mg/dL 1 08 1 03  --  1 00 0 97 1 02 1 20 1 21   CALCIUM mg/dL 7 8* 7 9*  --  7 8* 8 0* 7 8* 7 4* 7 5*   MAGNESIUM mg/dL  --   --   --   --   --   --   --  1 7   PHOSPHORUS mg/dL  --   --   --   --   --   --   --  5 6*     I have personally reviewed the blood work as stated above and in my note  I have personally reviewed vascular note

## 2022-11-13 NOTE — NURSING NOTE
Upon AM assessment, patient confused and only oriented to self  Patient moaning and stating she is having "pain all over"  Patient not following directions and refusing medications from Rn, despite Dr Simone Montanez informing patient to take medications  Patient states medications "taste bad"  Will continue to attempt to administer medications  Patient refusing to be turned and repositioned, as it causes her pain "in her back"  RN educated patient importance of turn but no evidence of learning       Steven Camarena ,11/13/2022 2:33 PM

## 2022-11-13 NOTE — ASSESSMENT & PLAN NOTE
· Patient has uncontrolled diabetes, hypertension, hyperlipidemia, patient was lost to follow-up and has not seen endocrinologist for over a year  Admits that she is poorly compliant with medication  Admitted due to Worsening lower extremity wounds     · S/p abdominal aortogram,  LLE SFA shockwave angioplasty/FATOU 10/31/22   · S/p R femoral endarterectomy 11/7  · S/p aspiration thrombectomy in right common iliac artery and left SFA  · Currently on argatroban drip, statin, Plavix  · Discussed with vascular surgery, patient will need bilateral above knee amputation

## 2022-11-13 NOTE — ASSESSMENT & PLAN NOTE
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, also cannot exclude septic emboli as the etiology  Intracranial imaging to be completed to rule out significant stenosis or occlusion  · Encephalopathy - poly factorial   · Peripheral artery disease status post right lower extremity arteriogram and with atherectomy, stenting and SFA/pop angioplasty an aspiration of thrombosis  · NYDIA reported to be resolved  · Hypertension  · Thrombocytopenia concerning for possible HIT   · Sepsis Critieria - UA consistent with a UTI, blood culture and urine culture pending, she does have a leukocytosis as well     · Follow stroke pathway  · Neuro following  · Transfer patient back to the intensive care unit

## 2022-11-13 NOTE — ASSESSMENT & PLAN NOTE
52-year-old female with history of insulin dependent DM , HTN, HLD, obesity, depression, initially presenting on 10/22 for left LE wounds and subsequent prolonged hospitalization:    Has has very complicated hospitalization: undergone multiple vascular procedures/interventions in both legs, complicated by atheroembolic events in both legs  She was during admission on heparin drip and developed thrombocytopenia, there was also concern for HIT, she is now on argatroban  Patient also met sepsis criteria (treated with IV antibiotics), she was also noted to have hypertensive urgency on arrival, NYDIA which has now resolved  She did have a episode of fluid overload and was in the ICU and recently moved from the ICU to the floor  During admission there was mental status change of unclear time origin; CT head was obtained on 11/13 and noted moderate size acute/recent infarct in the right parietal lobe with local mass effect there was no evidence of acute hemorrhage  Acute right hemispheric moderate size stroke - etiology unclear at this time, need to rule out cardioembolic etiology, 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, also cannot exclude septic emboli as the etiology (see below, blood cultures from 11/13 AM positive for Gram positive cocci in clusters)      Plan:  -will discuss with attending neurologist: if strong suspicion for endocarditis given positive BC's, patient's R hemisphere stroke at increased risk of hemorrhagic conversion, especially in the setting of ongoing argatroban use; however continues to be high risk for thrombotic/PAD related events for which vascular surgery is following (need to weigh risk/benefit of anticoagulation)  -stroke workup ongoing:  -repeat CT head this morning  -CT head on 11/13 noting moderate R hemisphere infarct; no obvious hemorrhagic conversion  -CTA head/neck yesterday with moderate vertebral origin stenosis bilaterally, some moderate short-segment right extracranial ICA stenosis, otherwise no critical stenosis nor large vessel occlusion  -MRI brain pending  -2D echocardiogram with EF 60%, technically poor study but with normal atria size bilaterally  -continuing on argatroban given the ongoing vascular issues, continuing Plavix 75 mg daily  -Blood cultures yesterday AM positive: Gram positive cocci in clusters   -may need a LEON to evaluate for endocarditis, involve ID if indicated, defer to critical care   -trend for fever/leukocytosis    -on IV Rocephin     -hemoglobin A1c in late October of 9 8, lipid panel with LDL of 55  -neuro checks  -telemetry monitoring  -stroke education provided  -therapy evaluations when clinically/medically appropriate  -continued vascular management:   -Peripheral artery disease status post right lower extremity arteriogram and with atherectomy, stenting and SFA/pop angioplasty an aspiration of thrombosis  -nephrology following for NYDIA; reported to be resolved     Recommend CT of head on 11/14 to monitor swelling and to monitor for development of hemorrhagic conversion  Peak swelling is 3-5 days post stroke, monitor examination closely

## 2022-11-13 NOTE — NURSING NOTE
Patient ordered bladder scans; however, no urine in bladder when scanned  Jacinto draining well      Ophelia Salomon 11/13/2022 6:56 PM

## 2022-11-13 NOTE — NURSING NOTE
Ongoing conversations between RN, Pharm, & SLIM regarding how best to follow protocol for argatroban infusion, considering pt's PTT results  Last PTT was 97  Protocol states to decrease gtt by 0 1mcg/kg/min  Gtt is already running at 0 02mcg/kg/min  Pharm suggested cutting rate in half (to equal 0 01mcg/kg/min ) IV pump did not allow a dose this low to infuse  SLIM made aware, stated that for now we can leave the infusion rate as it is and draw the next PTT when it is time (at approx  0800)

## 2022-11-13 NOTE — PROGRESS NOTES
During rounds with Dr Breann Calix this morning, we discussed obtaining a CT head for this patient given her acute change in mental status  We discussed possible etiologies for this acute change and could not rule out CVA as a cause  Dr Breann Calix felt that it was reasonable to discuss this with the patient's primary team who agreed with obtaining the CT head  Image shows moderate-sized acute infarction on the right parietal lobe with local mass effect, not hemorrhagic  Discussed with the SLIM attending who is placing the patient on stroke pathway and discussed with neurology  Acceptable to discontinue anticoagulation in setting of acute CVA if that's recommended by neurology

## 2022-11-13 NOTE — CONSULTS
Consultation - Neurology   Sudarshan Stafford 65 y o  female MRN: [id number]  Unit/Bed#: [place] [address] [id number] Encounter: [id number]    Assessment/Plan    Stroke [facility])  Assessment & Plan  42-year-old female with history of insulin dependent diabetes, hypertension, hyperlipidemia, obesity, depression who presented to hospital on 10/22 for left foot wound  She has had a fairly prolonged and complicated hospital course  Per record review, she she has undergone multiple vascular procedures/interventions in both legs, complicated by atheroembolic events in both legs  She is also on a heparin drip and she developed thrombocytopenia, there was also concern for HIT, she is now on argatroban  On admission she did meet criteria for sepsis and was treated with IV antibiotics, she was also noted to have hypertensive urgency on arrival, NYDIA which has now resolved  She did have a episode of fluid overload and was in the ICU and recently moved from the ICU to the floor  It was noted the patient did have a change in mental status unclear when this truly worsened, she met criteria for sepsis and work up is underway for source, secondary to her change in mental status CT of the head was completed on 11/13 which showed a moderate size acute/recent infarct in the right parietal lobe with local mass effect there was no evidence of acute hemorrhage  Vascular surgery evaluated the patient 11/13 and there is concern that she may need lower extremity amputations  The patient is currently on Plavix and agatraban  She is also on a statin medication  Echo was completed this admission which showed an ef of 60%, but this was of poor quality, bilateral atrium normal      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, also cannot exclude septic emboli as the etiology     Intracranial imaging to be completed to rule out significant stenosis or occlusion  · Encephalopathy - poly factorial   · Peripheral artery disease status post right lower extremity arteriogram and with atherectomy, stenting and SFA/pop angioplasty an aspiration of thrombosis  · NYDIA reported to be resolved  · Hypertension  · Thrombocytopenia concerning for possible HIT   · Sepsis Critieria - UA consistent with a UTI, blood culture and urine culture pending, she does have a leukocytosis as well  - Stroke pathway, initiated by medicine team  • MRI brain with contrast when able and safe to perform  • Reviewed with medicine team, cleared by nephrology for CTA of head and neck - this will be completed today  • Echo completed, as above - recommend repeat echo to rule out development of thrombus or vegetation  • She may need a LEON to evaluate for endocarditis, however, at this time she is not stable for this procedure to be completed   • Lipid Panel  • Hemoglobin A1c - 9 8H  • Plavix and Agatroban, currently - the plan is to continue this medication - she is at risk for bleed, but there is risk for more embolic events off of this medication, risk /benefit - recommend frequent neurological checks and stat ct of the head with any changes in examination  • Atorvastatin 40 mg  • Avoid hypotension, blood pressure goal per neuro-perspective [phone] systolic  • Continue telemetry  • PT/OT/ST  • Frequent neuro checks  Continue to monitor and notify neurology with any changes  Recommend frequent neurological checks   Low threshold to transfer to ICU  Recommend CT of head on 11/14 to monitor swelling and to monitor for development of hemorrhagic conversion  Peak swelling is 3-5 days post stroke, monitor examination closely  - Medical management and supportive care per primary team  Correction of any metabolic or infectious disturbances  Reviewed case extensively with medicine team    Cleared by nephrology for CTA of head and neck  - Vascular surgery is following closely  Maribeth Stauffer will need follow up in in 6 weeks with neurovascular attending  She will not require outpatient neurological testing, at this time  History of Present Illness     Reason for Consult / Principal Problem: Change in mental status, stroke  HPI: Maribeth Stauffer is a 65 y o   female with history of insulin-dependent diabetes, hypertension, hyperlipidemia, obesity, depression, who presented to the hospital on 10/22 for a left foot wound  On admission she met criteria for sepsis, and was placed on IV antibiotics, it was felt that this may be secondary to UTI vs a foot infection  She was also noted to have HTN urgency, on arrival    The patient was seen by Podiatry and vascular surgery service, she was diagnosed with peripheral artery disease  She is also noted to have NYDIA and nephrology was following the patient  MRI of the left lower extremity was completed which showed no evidence of osteomyelitis  The patient underwent abdominal arteriogram, left lower extremity and shockwave angioplasty/drug-eluting stent this admission  She is also, status post right femoral enterectomy on 11/07  It is reported the had a RLE arteriogram with atherectomy, stenting, and SFA/pop angioplasty and aspiration of thrombosis this admission as well, by vascular surgery  Per record review, the patient had multiple interventions and atheroembolic events in both legs  The patient was in the ICU and discharged yesterdday, per notes the patient had acute hypoxic respiratory failure postoperatively secondary to possible mild volume overload and sedation hyperventilation, her NYDIA has resolved, she is reported to have pain in lower extremities due to ischemia and reperfusion, there was also concern for hit as she was found to have thrombocytopenia - she was started on a agtroban  The patient is currently on Plavix and agatraban       11/13 - the patient was seen by vascular surgery, the patient my need bilateral LE amputation  The patient also met sepsis criteria, on 11/13, she was placed on antibiotics  Secondary to the patient change in mental status a CT of the head was completed - which revealed a moderate sized acute/recent infarct in the right parietal lobe with local mass effect, there was no acute intracranial hemorrhage  Noted  Neurology was consulted secondary to her change in mental status and findings on CT imaging  All information is from chart review as the patient cannot provide a hx  It was documented, the patient was lethargic in the ICU, but appears she has been more confused over the last few days  She reports everything is hurting her  She does not endorse any other complaints, but question reliability  Inpatient consult to Neurology  Consult performed by: Lauren Sargent PA-C  Consult ordered by: Evelyne Pedro MD        Review of Systems   Limited secondary to mental status  Historical Information   Past Medical History:   Diagnosis Date   • Anxiety    • Depression    • Diabetes ([facility] [address] )    • Diabetes mellitus ([facility] [address] )    • Esophageal reflux     28 APR 2015 RESOLVED   • Hyperlipidemia    • Hypertension    • Low back pain     sciatica   • Pneumonia 2019   • Stroke ([facility] [address] ) 12/2015    small vessel, L frontal corona radiata   Rx asa 81, Lipitor 40, risk modification   • Vitamin D deficiency      Past Surgical History:   Procedure Laterality Date   • ARTERIOGRAM Right 11/7/2022    Procedure: -Right lower extremity angiogram -Right CFA balloon angioplasty with [id number]  [employer]  -Right CFA DCB with 6x40 Bard Lutonix -Right CFA atherectomy with Jetstream and distal embolization protection with 7mm Spider FX -Right SFA/Pop angioplasty with 4x40 Chololate balloon -Right SFA/Pop DCB with 5x150 Bard Lutonix -Right SFA stent with [employer] x2 -R   • COLONOSCOPY     • IR AORTAGRAM WITH RUN-OFF  10/31/2022     Social History   Social History     Substance and Sexual Activity   Alcohol Use Never    Comment: OCCASIONAL ALCOHOL USE IN ALL SCRIPTS     Social History     Substance and Sexual Activity   Drug Use No     E-Cigarette/Vaping   • E-Cigarette Use Never User      E-Cigarette/Vaping Substances   • Nicotine No    • THC No    • CBD No    • Flavoring No    • Other No    • Unknown No      Social History     Tobacco Use   Smoking Status Former Smoker   • Types: Cigarettes   • Quit date:    • Years since quittin 8   Smokeless Tobacco Never Used     Family History:   Family History   Problem Relation Age of Onset   • Diabetes Mother    • Lung cancer Mother    • Cancer Father    • Lung cancer Father    • Hypertension Brother    • Hypertension Family      Meds/Allergies   all current active meds have been reviewed, current meds:   Current Facility-Administered Medications   Medication Dose Route Frequency   • acetaminophen (TYLENOL) tablet 650 mg  650 mg Oral Q6H PRN   • acetaminophen (TYLENOL) tablet 650 mg  650 mg Oral Q8H [address]   • amLODIPine (NORVASC) tablet 10 mg  10 mg Oral Daily   • argatroban infusion (premix)  0 1-3 mcg/kg/min Intravenous Titrated   • atorvastatin (LIPITOR) tablet 40 mg  40 mg Oral Daily With Dinner   • carvedilol (COREG) tablet 25 mg  25 mg Oral BID With Meals   • cefTRIAXone (ROCEPHIN) 2,000 mg in dextrose 5 % 50 mL IVPB  2,000 mg Intravenous Q24H   • clopidogrel (PLAVIX) tablet 75 mg  75 mg Oral Daily   • doxazosin (CARDURA) tablet 2 mg  2 mg Oral Daily   • gabapentin (NEURONTIN) capsule 200 mg  200 mg Oral BID   • hydrALAZINE (APRESOLINE) injection 10 mg  10 mg Intravenous Q6H PRN   • HYDROmorphone (DILAUDID) injection 0 5 mg  0 5 mg Intravenous Q4H PRN   • insulin glargine (LANTUS) subcutaneous injection 25 Units 0 25 mL  25 Units Subcutaneous HS   • insulin lispro (HumaLOG) 100 units/mL subcutaneous injection 2-12 Units  2-12 Units Subcutaneous TID AC   • LORazepam (ATIVAN) tablet 0 5 mg  0 5 mg Oral Q8H PRN   • metoclopramide (REGLAN) injection 5 mg  5 mg Intravenous Q6H PRN   • naloxone (NARCAN) 0 04 mg/mL syringe 0 04 mg  0 04 mg Intravenous Q1MIN PRN   • nystatin (MYCOSTATIN) powder   Topical BID   • ondansetron (ZOFRAN) injection 4 mg  4 mg Intravenous Q6H PRN   • oxyCODONE (ROXICODONE) immediate release tablet 10 mg  10 mg Oral Q4H PRN   • oxyCODONE (ROXICODONE) IR tablet 5 mg  5 mg Oral Q4H PRN   • pantoprazole (PROTONIX) EC tablet 40 mg  40 mg Oral Early Morning   • polyethylene glycol (MIRALAX) packet 17 g  17 g Oral Daily PRN   • potassium chloride (K-DUR,KLOR-CON) CR tablet 40 mEq  40 mEq Oral Once   • QUEtiapine (SEROquel) tablet 25 mg  25 mg Oral HS   • sertraline (ZOLOFT) tablet 150 mg  150 mg Oral Daily    and PTA meds:   Prior to Admission Medications   Prescriptions Last Dose Informant Patient Reported? Taking?    ALPRAZolam (XANAX) 0 25 mg tablet   No Yes   Sig: Take 1 tablet (0 25 mg total) by mouth daily as needed for anxiety (panic attacks)   Cholecalciferol (Vitamin D-3) 25 MCG (1000 UT) CAPS Not Taking at Unknown time  Yes No   Sig: Take 2,000 Units by mouth daily   Patient not taking: Reported on 10/21/2022   Continuous Blood Gluc  (FreeStyle Noé 14 Day [place]) Avel Player   No No   Sig: Use 1 Units continuous   Patient not taking: Reported on 10/21/2022   Continuous Blood Gluc Sensor (FreeStyle Noé 14 Day Sensor) MISC Not Taking at Unknown time  No No   Sig: Use daily as directed for CGM - Change every 14 days   Patient not taking: No sig reported   Doxylamine Succinate, Sleep, (UNISOM PO) Not Taking at Unknown time  Yes No   Sig: Take by mouth   Patient not taking: No sig reported   Insulin Pen Needle (B-D UF III MINI PEN NEEDLES) 31G X 5 MM MISC   No No   Sig: USE WITH INSULIN FOUR TIMES DAILY   Lancets 28G MISC Not Taking at Unknown time  No No   Sig: Use 1 each 4 (four) times a day   Patient not taking: No sig reported   Multiple Vitamin (multivitamin) tablet   Yes Yes   Sig: Take 1 tablet by mouth daily   Trulicity 8 40 LEE/9 4PB SOPN   No Yes   Sig: INJECT 0 5 ML (0 75 MG TOTAL) UNDER THE SKIN ONCE A WEEK TUESDAY   amLODIPine (NORVASC) 10 mg tablet   No Yes   Sig: TAKE 1 TABLET BY MOUTH EVERY DAY   aspirin 81 mg chewable tablet  Self Yes Yes   Sig: Chew 81 mg   atorvastatin (LIPITOR) 40 mg tablet   No Yes   Sig: TAKE 1 TABLET BY MOUTH EVERY DAY   clotrimazole-betamethasone (LOTRISONE) 1-0 05 % cream   No Yes   Sig: Apply topically 2 (two) times a day   fluocinonide (LIDEX) 0 05 % ointment   No Yes   Sig: Apply topically 2 (two) times a day   gabapentin (NEURONTIN) 100 mg capsule   No Yes   Sig: TAKE 2 CAPSULES BY MOUTH TWICE A DAY   glucose blood (True Metrix Blood Glucose Test) test strip Not Taking at Unknown time  No No   Sig: Use 1 each 4 (four) times a day   Patient not taking: No sig reported   insulin aspart (NovoLOG FlexPen) 100 UNIT/ML injection pen   No Yes   Sig: Inject 20 Units under the skin 3 (three) times a day with meals   insulin glargine (Lantus SoloStar) 100 units/mL injection pen   No Yes   Sig: Inject 60 Units under the skin daily at bedtime   lisinopril (ZESTRIL) 40 mg tablet   No Yes   Sig: TAKE 1 TABLET BY MOUTH EVERY DAY   metoprolol succinate (TOPROL-XL) 25 mg 24 hr tablet   No Yes   Sig: TAKE 1 TABLET (25 MG TOTAL) BY MOUTH DAILY  pantoprazole (PROTONIX) 40 mg tablet   No No   Sig: Take 1 tablet (40 mg total) by mouth daily for 14 days   potassium chloride (K-DUR,KLOR-CON) 10 mEq tablet   No No   Sig: Take 2 tablets (20 mEq total) by mouth 2 (two) times a day for 2 doses   sertraline (ZOLOFT) 100 mg tablet   No Yes   Sig: TAKE 1 TABLET BY MOUTH EVERY DAY      Facility-Administered Medications: None     No Known Allergies    Objective   Vitals:Blood pressure 155/68, pulse 90, temperature 98 4 °F (36 9 °C), resp  rate (!) 25, height 5' 3 75" (1 619 m), weight 116 kg (255 lb 11 7 oz), SpO2 94 %, not currently breastfeeding  ,Body mass index is 44 24 kg/m²      Intake/Output Summary (Last 24 hours) at 11/13/2022 1619  Last data filed at 11/13/2022 1207  Gross per 24 hour   Intake 616 1 ml   Output 2300 ml   Net -1683 9 ml     Invasive Devices: Invasive Devices  Report    Peripheral Intravenous Line  Duration           Peripheral IV 11/09/22 Left;Proximal;Upper;Ventral (anterior) Arm 4 days    Peripheral IV 11/10/22 Right;Ventral (anterior) Forearm 3 days          Drain  Duration           Urethral Catheter Latex 16 Fr  6 days              Vital signs reviewed  Constitutional - Ill appearing, oxygen place, at times moaning and groaning, slurring of words noted  She appears to be neglecting the left side  HEENT - NC/AT, oxygen in place via NC  Cardiac -rate regular  Lungs - No respiratory distress noted  Abdomen - non distended  Extremities - No edema noted in lowers, and discoloring, mottling  Skin - no rashes noted    Neurological    Mental status - the patient is awake, moaning and groaning  She is able to maintain awake fulness during examination, slight decrease in attn and concentration  She is able to tell us name, year, place, she is able to tell us birthday, she is able to tell us president  She is able to follow one step commands, more difficulty with more complex commands  She is able to recognize objects  She is able to tell us color of the glove  There is dysarthria, no aphasia  Cranial nerves 2 through 12 are intact - there is a left visual neglect noted  No blink to threat on the left  Subtle decrease in left nasolabial fold  She does not look to the left, she has a right gaze preference  Motor -  Spontaneous movement noted on the right upper and lower, she does not move her left upper spontaneously  She appears to have left upper and lower extremity weakness, she cannot lift off bed immediate fall to bed on the left  There was some movement seen in the left lower  She was able to  the left upper at least 3/5, biceps is a 4- on the left  2-3/5 in the left lower extremity  She did lift the left lower off the bed briefly  Right upper 4/5, right lower there was some movement noted in the right lower extremity she attempted to lift off the bed but was unable    No tremor observed  Sensation - she has left sided neglect, she does not appreciate touch on the left upper and lower  Reflexes were limited secondary to pain  No evidence of seizure activity, observed  Unable to check babinski  Examined alongside attending physician  Lab Results:     Recent Results (from the past 24 hour(s))   Urinalysis with microscopic    Collection Time: 11/12/22  4:25 PM   Result Value Ref Range    Color, UA Yellow     Clarity, UA Slightly Cloudy     Specific [place], UA 1 025 1 003 - 1 030    pH, UA 5 5 4 5, 5 0, 5 5, 6 0, 6 5, 7 0, 7 5, 8 0    Leukocytes, UA Trace (A) Negative    Nitrite, UA Positive (A) Negative    Protein, UA >=300 (A) Negative mg/dl    Glucose, UA Negative Negative mg/dl    Ketones, UA Negative Negative mg/dl    Urobilinogen, UA 1 0 0 2, 1 0 E U /dl E U /dl    Bilirubin, UA Negative Negative    Occult Blood, UA Large (A) Negative    RBC, UA 1-2 (A) None Seen, 2-4 /hpf    WBC, UA Innumerable (A) None Seen, 2-4, 5-60 /hpf    Epithelial Cells Occasional None Seen, Occasional /hpf    Bacteria, UA Moderate (A) None Seen, Occasional /hpf    OTHER OBSERVATIONS Renal Epithelial Cells Present    Fingerstick Glucose (POCT)    Collection Time: 11/12/22  4:31 PM   Result Value Ref Range    POC Glucose 100 65 - 140 mg/dl   APTT    Collection Time: 11/12/22  4:35 PM   Result Value Ref Range     (H) 23 - 37 seconds   Fingerstick Glucose (POCT)    Collection Time: 11/12/22  9:26 PM   Result Value Ref Range    POC Glucose 96 65 - 140 mg/dl   Blood culture    Collection Time: 11/13/22  1:09 AM    Specimen: Arm, Right; Blood   Result Value Ref Range    Blood Culture Received in Microbiology Lab  Culture in Progress      Blood culture Collection Time: 11/13/22  1:09 AM    Specimen: Arm, Left; Blood   Result Value Ref Range    Blood Culture Received in Microbiology Lab  Culture in Progress      APTT    Collection Time: 11/13/22  1:48 AM   Result Value Ref Range    PTT 97 (H) 23 - 37 seconds   Lactic Acid with Reflex STAT    Collection Time: 11/13/22  1:48 AM   Result Value Ref Range    LACTIC ACID 1 0 0 5 - 2 0 mmol/L   Procalcitonin    Collection Time: 11/13/22  1:48 AM   Result Value Ref Range    Procalcitonin 0 48 (H) <=0 25 ng/ml   CBC and differential    Collection Time: 11/13/22  1:48 AM   Result Value Ref Range    WBC 15 00 (H) 4 31 - 10 16 Thousand/uL    RBC 2 61 (L) 3 81 - 5 12 Million/uL    Hemoglobin 7 2 (L) 11 5 - 15 4 g/dL    Hematocrit 22 8 (L) 34 8 - 46 1 %    MCV 87 82 - 98 fL    MCH 27 6 26 8 - 34 3 pg    MCHC 31 6 31 4 - 37 4 g/dL    RDW 15 5 (H) 11 6 - 15 1 %    MPV 10 4 8 9 - 12 7 fL    Platelets 67 (L) 212 - 390 Thousands/uL    nRBC 0 /100 WBCs    Neutrophils Relative 84 (H) 43 - 75 %    Immat GRANS % 1 0 - 2 %    Lymphocytes Relative 5 (L) 14 - 44 %    Monocytes Relative 9 4 - 12 %    Eosinophils Relative 1 0 - 6 %    Basophils Relative 0 0 - 1 %    Neutrophils Absolute 12 66 (H) 1 85 - 7 62 Thousands/µL    Immature Grans Absolute 0 19 0 00 - 0 20 Thousand/uL    Lymphocytes Absolute 0 72 0 60 - 4 47 Thousands/µL    Monocytes Absolute 1 30 (H) 0 17 - 1 22 Thousand/µL    Eosinophils Absolute 0 11 0 00 - 0 61 Thousand/µL    Basophils Absolute 0 02 0 00 - 0 10 Thousands/µL   Comprehensive metabolic panel    Collection Time: 11/13/22  1:48 AM   Result Value Ref Range    Sodium 140 135 - 147 mmol/L    Potassium 3 1 (L) 3 5 - 5 3 mmol/L    Chloride 102 96 - 108 mmol/L    CO2 31 21 - 32 mmol/L    ANION GAP 7 4 - 13 mmol/L    BUN 16 5 - 25 mg/dL    Creatinine 1 08 0 60 - 1 30 mg/dL    Glucose 85 65 - 140 mg/dL    Calcium 7 8 (L) 8 3 - 10 1 mg/dL    Corrected Calcium 9 8 8 3 - 10 1 mg/dL     (H) 5 - 45 U/L    ALT 53 12 - 78 U/L Alkaline Phosphatase 232 (H) 46 - 116 U/L    Total Protein 5 9 (L) 6 4 - 8 4 g/dL    Albumin 1 5 (L) 3 5 - 5 0 g/dL    Total Bilirubin 0 67 0 20 - 1 00 mg/dL    eGFR 55 ml/min/1 73sq m   C3 complement    Collection Time: 11/13/22  1:48 AM   Result Value Ref Range    C3 Complement 133 0 90 0 - 180 0 mg/dL   C4 complement    Collection Time: 11/13/22  1:48 AM   Result Value Ref Range    C4, COMPLEMENT 32 0 10 0 - 40 0 mg/dL   Fingerstick Glucose (POCT)    Collection Time: 11/13/22  8:04 AM   Result Value Ref Range    POC Glucose 89 65 - 140 mg/dl   Fingerstick Glucose (POCT)    Collection Time: 11/13/22 11:23 AM   Result Value Ref Range    POC Glucose 86 65 - 140 mg/dl   Protein / creatinine ratio, urine    Collection Time: 11/13/22 12:03 PM   Result Value Ref Range    Creatinine, Ur 84 6 mg/dL    Protein Urine Random 468 mg/dL    Prot/Creat Ratio, Ur 5 53 (H) 0 00 - 0 10   APTT    Collection Time: 11/13/22 12:59 PM   Result Value Ref Range    PTT 95 (H) 23 - 37 seconds     Procedure: XR chest portable    Result Date: 11/13/2022  Narrative: CHEST INDICATION:   sirs  COMPARISON:  Chest radiograph from November 10, 2022 EXAM PERFORMED/VIEWS:  XR CHEST PORTABLE FINDINGS: Cardiomediastinal silhouette appears unremarkable  Mild pulmonary edema  Bibasilar atelectasis  No pneumothorax  Trace layering effusions  Osseous structures appear within normal limits for patient age  Impression: Mild pulmonary edema and trace layering pleural effusions  No definite consolidation  If continued clinical concern, suggest follow-up frontal and lateral views for further evaluation  Workstation performed: [id number]     Procedure: CT head wo contrast    Result Date: 11/13/2022  Narrative: CT BRAIN - WITHOUT CONTRAST INDICATION:   Mental status change, unknown cause change in mental status, on full anticoagulation  COMPARISON:  2/25/2018 TECHNIQUE:  CT examination of the brain was performed    In addition to axial images, sagittal and coronal 2D reformatted images were created and submitted for interpretation  Radiation dose length product (DLP) for this visit:  1838 mGy-cm   This examination, like all CT scans performed in the [facility], was performed utilizing techniques to minimize radiation dose exposure, including the use of iterative reconstruction and automated exposure control  IMAGE QUALITY:  Motion artifact limits portions of the study  Portions of the study were repeated  Some diagnostic information is obtained  FINDINGS: PARENCHYMA:  There is a moderate region of wedge-shaped hypodensity extending from the periventricular margin to the cortical surface predominantly in the right parietal lobe indicative of a moderate, acute/recent infarction  There is no large hemorrhage  There is local sulcal effacement and mass effect without subfalcine herniation  Atherosclerotic calcifications of the cavernous segment of the internal carotid artery are mild  VENTRICLES AND EXTRA-AXIAL SPACES:  Mild effacement of the occipital horn of the right lateral ventricle secondary to adjacent cytotoxic edema in the right parietal lobe  VISUALIZED ORBITS AND PARANASAL SINUSES:  Unremarkable  CALVARIUM AND EXTRACRANIAL SOFT TISSUES:  Normal      Impression: 1  Moderate-sized acute/recent infarction centered on the right parietal lobe with local mass effect  No acute intracranial hemorrhage  Workstation performed: [id number]     Imaging Studies: I have personally reviewed pertinent reports  EKG, Pathology, and Other Studies: I have personally reviewed pertinent reports  Code Status: Level 1 - Full Code    Counseling / Coordination of Care  Reviewed case with neurology attending, history and physical examination, labs and imaging completed, plan of care as per attending physician  Please see attestation for further details

## 2022-11-13 NOTE — ASSESSMENT & PLAN NOTE
Hemoglobin fluctuating with baseline around 10  S/p 1 unit transfused 11/3/2022  Hemoglobin 7 2 today  Transfuse if less than 7    Recent Labs     11/12/22  0424 11/13/22  0148   HGB 7 8* 7 2*

## 2022-11-13 NOTE — ASSESSMENT & PLAN NOTE
· Patient spiked a fever, has leukocytosis  · UA positive  · Add on urine culture  · Blood cultures pending  · Continue ceftriaxone

## 2022-11-13 NOTE — ASSESSMENT & PLAN NOTE
Lab Results   Component Value Date    WBC 15 00 (H) 11/13/2022    WBC 15 36 (H) 11/12/2022    WBC 15 69 (H) 11/11/2022     Echo on 11/07 shows 17% LVEF, no diastolic dysfunction  After the procedure patient was noted to have desaturated to 85% SpO2, and needed 5 L via face mask to saturate greater than 90%  Weaned down to 3 L saturating 95%     Continue to wean as able

## 2022-11-13 NOTE — NURSING NOTE
Last PTT was 112  Per protocol, infusion was paused x 1hr  To restart infusion at 50% previous rate, rate would need to be decreased to 0 025mcg/kg/min  IV pumps incapable of such a low dose  Ashlyn Tena from [facility] who advised using 0 02mch/kg/min as current dose (instead of rounding up to 0 03mcg/kg/min)  SLIM aware  Next PTT in 4hrs

## 2022-11-13 NOTE — PROGRESS NOTES
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, also cannot exclude septic emboli as the etiology  Intracranial imaging to be completed to rule out significant stenosis or occlusion  · Encephalopathy - poly factorial   · Peripheral artery disease status post right lower extremity arteriogram and with atherectomy, stenting and SFA/pop angioplasty an aspiration of thrombosis  · NYDIA reported to be resolved  · Hypertension  · Thrombocytopenia concerning for possible HIT   · Sepsis Critieria - UA consistent with a UTI, blood culture and urine culture pending, she does have a leukocytosis as well  · Follow stroke pathway  · Neuro following  · Transfer patient back to the intensive care unit      PAD (peripheral artery disease) [facility])  Assessment & Plan  Patient has uncontrolled diabetes, hypertension, hyperlipidemia, patient was lost to follow-up and has not seen endocrinologist for over a year  Admits that she is poorly compliant with medication  Admitted due to Worsening lower extremity wounds  · CTA abd/pelvis:   § 2   LEFT: Up to 50% stenosis of the proximal common iliac artery   The proximal and distal 3rd of the superficial femoral artery demonstrates 50-75% stenosis   Popliteal artery demonstrates greater than 75% stenosis in the P2 segment   Patent   three-vessel runoff extending into the left foot     § 3  RIGHT: Proximal common iliac artery stenosis of up to 50%   50% stenosis of the common femoral artery   Superficial femoral artery demonstrates up to 50% stenosis within the proximal several centimeters, 50-75% stenosis throughout the distal 3rd of   the vessel   Popliteal artery demonstrates scattered stenoses of 50-75%   Patent three-vessel runoff extending into the right foot    S/p abdominal aortogram,  LLE SFA shockwave angioplasty/FATOU 10/31/22   Post procedure  There was evidence of improvement in SHARIF in the left however on the right, SHARIF was noted to decrease to 0 51 (0 80)  CTA abd/pelvis w/ run off 11/1/22: Flow-limiting dissection flap at the prior arteriotomy site in the R CFA superimposed upon moderate to severe atherosclerotic disease  Focal severe atherosclerotic narrowing at the adductor canal with additional moderate atherosclerotic narrowing throughout the remainder of the R SFA  S/p R femoral endarterectomy 11/7  Severe pain post procedure  CTA 11/9 shows Suspect interval occlusion of recently placed proximal right common iliac artery stent  11/10 s/p aspiration thrombectomy in right common iliac artery and Left SFA  S/p Thrombosed distal left SFA stent treated with aspiration thrombectomy  For monitoring in the ICU  Will monitor post procedure in the ICU  Heparin gtt transitioned to Argatroban infusion in setting of thrombocytopenia on 11/11   Continue atorvastatin 40 mg once daily, clopidogrel 45 mg once daily  Vascular is the primary    Diabetic ulcer of heel associated with diabetes mellitus due to underlying condition [facility])  Assessment & Plan    Podiatry on board with recommendations  below appreciated  · No immediate plan for podiatric surgical intervention  Dressings changed, no clinical sign of acute infection B/L  · Continue local wound care, betadine DSD  Appreciate nursing assistance with dressing changes  · Elevation on green foam wedges or pillows when non-ambulatory    · Rest of care per primary team         Acute on chronic anemia  Assessment & Plan  Lab Results   Component Value Date    HGB 7 2 (L) 11/13/2022    HGB 7 8 (L) 11/12/2022    HGB 8 2 (L) 11/11/2022 Hemoglobin fluctuating with baseline around 10  S/p 1 unit transfused 11/3/2022, H/H improved and has remained stable ever since  FOBT neg, no current source of bleeding, groin without hematoma  Continue to monitor H/H  Transfuse for Hgb less than 7     Thrombocytopenia (HCC)  Assessment & Plan  Significant reduction in platelet > 50 % in the past 4 days  Patient started on Heparin infusion on 11/1 and SQ heparin prior  4Ts score 5- intermediate probability for HIIT  Ruled out DIC/DITP/ less likely infectious origin  Plan  DIC Screen- FDP, D Dimers, hemolysis screen, fibrinogen  HIT platelet antibody  Antithrombin III  Heparin gtt discontinued and Argatroban initiated    Acute respiratory failure with hypoxia [facility])  Assessment & Plan  Lab Results   Component Value Date    WBC 15 00 (H) 11/13/2022    WBC 15 36 (H) 11/12/2022    WBC 15 69 (H) 11/11/2022     Echo on 11/07 shows 88% LVEF, no diastolic dysfunction  After the procedure patient was noted to have desaturated to 85% SpO2, and needed 5 L via face mask to saturate greater than 90%  Weaned down to 3 L saturating 95%  Continue to wean as able      Wound of lower extremity  Assessment & Plan  · Wound care following  · Vascular following  · Podiatry following    Benign essential hypertension  Assessment & Plan  Vitals:    11/13/22 0559 11/13/22 0806 11/13/22 0900 11/13/22 1458   BP:  156/72  155/68   BP Location:       Pulse:  85  90   Resp:  (!) 24  (!) 25   Temp:  98 4 °F (36 9 °C)     TempSrc:       SpO2:  92% 90% 94%   Weight: 116 kg (255 lb 11 7 oz)      Height:       Patient's blood pressure has been running high in the 180s, 190s SBP  Patient's home dose of amlodipine 10 mg, lisinopril 40 mg, metoprolol succinate 25 mg daily  Lisinopril was held due to NYDIA  Plan:  Continue amlodipine 10 mg once daily, continue Coreg 25 mg b i d  Doxazosin 2 mg daily  Continue hydralazine 10 mg every 6 hours p r n   For SBP greater than 180  Hold off home lisinopril per nephrology due to NYDIA      Depression, recurrent [facility])  Assessment & Plan  ·  Seen by Psychiatry during this admission  Unspecified mood disorder with psychosis; rule out mood disorder with psychosis secondary to other physiological condition; rule out major depression with psychotic features  Plan    Recommend increasing Zoloft to 150 mg p o  daily for depression and anxiety  Seroquel 25 mg p o  q h s  as antidepressant adjunct as well as for complaints of insomnia, anxiety and hallucinations        HPI/24hr events:   81-year-old female initially admitted on 22 October 2022 secondary to a nonhealing left foot wound  At that time she complained of fevers, difficulty paying for her medications, decreased p o  Intake and nausea and vomiting  The patient was seen by vascular surgery and and underwent intervention for thrombus in the bilateral lower extremities as indicated above  She was transferred to the intensive care unit for closer monitoring on 11/07/22  She remained in the intensive care unit until 12 November 2022 when she was transferred back out to the medical-surgical unit  Patient had been maintained on argatroban secondary to high suspicion for hit  As noted, the day of transfer he she was moving her upper extremities and yelling while being transferred to the medical-surgical bed  Today she was noted to have a change in mental status, CT scan of the head was performed which showed a moderate sized acute recent infarct of the right parietal lobe with local mass effect with no evidence of acute hemorrhage  Stroke alert was called and the patient will be transferred back to the intensive care unit for monitoring as she is high risk for conversion to hemorrhagic stroke          Medications:  Current Facility-Administered Medications   Medication Dose Route Frequency Provider Last Rate   • acetaminophen  650 mg Oral Q6H PRN Agustina Morris MD     • acetaminophen  650 mg Oral Q8H [facility] Agustina Morris MD     • amLODIPine  10 mg Oral Daily Agustina Morris MD     • argatroban  0 1-3 mcg/kg/min Intravenous Titrated ALEX Can 0 02 mcg/kg/min (11/13/22 1503)   • atorvastatin  40 mg Oral Daily With Rada Libman, MD     • carvedilol  25 mg Oral BID With Meals Agustina Morris MD     • cefTRIAXone  2,000 mg Intravenous Q24H ALEX Woodall 2,000 mg (11/13/22 0253)   • clopidogrel  75 mg Oral Daily Agustina Morris MD     • doxazosin  2 mg Oral Daily Agustina Morris MD     • gabapentin  200 mg Oral BID Agustina Morris MD     • hydrALAZINE  10 mg Intravenous Q6H PRN Agustina Morris MD     • HYDROmorphone  0 5 mg Intravenous Q4H PRN ALEX Woodall     • insulin glargine  15 Units Subcutaneous HS Shubham Jean MD     • insulin lispro  2-12 Units Subcutaneous TID AC Agustina Morris MD     • LORazepam  0 5 mg Oral Q8H PRN Agustina Morris MD     • metoclopramide  5 mg Intravenous Q6H PRN Agustina Morris MD     • naloxone  0 04 mg Intravenous Q1MIN PRN gAustina Morris MD     • nystatin   Topical BID Agustina Morris MD     • ondansetron  4 mg Intravenous Q6H PRN Agustina Morris MD     • oxyCODONE  10 mg Oral Q4H PRN Agustina Morris MD     • oxyCODONE  5 mg Oral Q4H PRN Agustina Morris MD     • pantoprazole  40 mg Oral Early Morning Agustina Morris MD     • polyethylene glycol  17 g Oral Daily PRN ALEX Can     • potassium chloride  40 mEq Oral Once [facility], PA-C     • QUEtiapine  25 mg Oral HS ALEX Can     • sertraline  150 mg Oral Daily ALEX Can         argatroban, 0 1-3 mcg/kg/min, Last Rate: 0 02 mcg/kg/min (11/13/22 1503)          Physical exam:  Vitals:    Body mass index is 44 24 kg/m²  Blood pressure 155/68, pulse 90, temperature 98 4 °F (36 9 °C), resp  rate (!) 25, height 5' 3 75" (1 619 m), weight 116 kg (255 lb 11 7 oz), SpO2 94 %, not currently breastfeeding ,  Temp  Min: 96 3 °F (35 7 °C)  Max: 102 6 °F (39 2 °C)       SpO2: 94 %  SpO2 Activity: At Rest  O2 Device: Nasal cannula      Intake/Output Summary (Last 24 hours) at 11/13/2022 1735  Last data filed at 11/13/2022 1718  Gross per 24 hour   Intake 616 1 ml   Output 3000 ml   Net -2383 9 ml       Invasive/non-invasive ventilation settings:   Respiratory  Report   Lab Data (Last 4 hours)    None         O2/Vent Data (Last 4 hours)    None              Invasive Devices  Report    Peripheral Intravenous Line  Duration           Peripheral IV 11/09/22 Left;Proximal;Upper;Ventral (anterior) Arm 4 days    Peripheral IV 11/10/22 Right;Ventral (anterior) Forearm 3 days    Peripheral IV 11/13/22 Right;Ventral (anterior) Forearm <1 day          Drain  Duration           Urethral Catheter Latex 16 Fr  6 days                  Physical Exam:  Gen:  Patient is lethargic  HEENT:  Atraumatic normocephalic pupils equal round reactive to light extraocular movements are intact scleras are anicteric oral mucosa is pink and moist  Neck:  Supple no JVD no lymphadenopathy trachea midline  Chest:  Respirations are tachypneic breath sounds are distant  Cor:  Regular rate and rhythm no murmurs rubs or gallops appreciated  Abd:  Soft nontender with positive bowel sounds  Ext:  Lower extremities are cold and mottled, no palpable pulses there are blisters in the bilateral lower extremities  Neuro:  Rightward gaze, she responds appropriately to questions  She moves bilateral upper extremities  Skin:  Lower extremities are mottled and cold with blisters  Diagnostic Data:  Lab: I have personally reviewed pertinent lab results     CBC:   Results from last 7 days   Lab Units 11/13/22  0148 11/12/22  0424 11/11/22  1918 11/11/22  0435   WBC Thousand/uL 15 00* 15 36*  --  15 69*   HEMOGLOBIN g/dL 7 2* 7 8*  --  8 2*   HEMATOCRIT % 22 8* 24 9*  --  26 4*   PLATELETS Thousands/uL 67* 41* 36* 40*       CMP:   Results from last 7 days   Lab Units 11/13/22  0148 11/12/22  0424 11/11/22  0435   SODIUM mmol/L 140 140 139   POTASSIUM mmol/L 3 1* 3 4* 3 3*   CHLORIDE mmol/L 102 103 103   CO2 mmol/L 31 29 28   BUN mg/dL 16 17 18   CREATININE mg/dL 1 08 1 03 1 00   CALCIUM mg/dL 7 8* 7 9* 7 8*   ALK PHOS U/L 232* 102 112   ALT U/L 53 46 44   AST U/L 114* 95* 98*     PT/INR:   No results found for: PT, INR,   Magnesium:   Results from last 7 days   Lab Units 11/07/22  1520   MAGNESIUM mg/dL 1 7     Phosphorous:   Results from last 7 days   Lab Units 11/07/22  1520   PHOSPHORUS mg/dL 5 6*       Microbiology:  Results from last 7 days   Lab Units 11/13/22  0109   BLOOD CULTURE  Received in Microbiology Lab  Culture in Progress  Received in Microbiology Lab  Culture in Progress  Imaging:  CT of the head done today demonstrates a moderate-sized acute infarction centered in the right parietal lobe with local mass effect no acute intracranial hemorrhage    Cardiac lab/EKG/telemetry/ECHO:   Sinus rhythm    VTE Prophylaxis:  Argatroban    Code Status: Level 1 - Full Code    ALEX Lazo    Portions of the record may have been created with voice recognition software  Occasional wrong word or "sound a like" substitutions may have occurred due to the inherent limitations of voice recognition software  Read the chart carefully and recognize, using context, where substitutions have occurred

## 2022-11-13 NOTE — ASSESSMENT & PLAN NOTE
· Due to change in mental status stat CT head ordered today  · CT showed moderate size acute/recent infarction centered on the right parietal lobe with local mass effect  No acute intracranial hemorrhage  · Stroke pathway  · CTA head and neck pending    Discussed with Nephrology, hold Bumex this afternoon  · Echo ordered  · Due to possible HIT, continue argatroban  · Stat repeat CT head with any new neurologic findings  · Discussed with Neurology, appreciate recommendations  · Continue statin and Plavix

## 2022-11-13 NOTE — ASSESSMENT & PLAN NOTE
Lab Results   Component Value Date    HGBA1C 9 8 (H) 10/25/2022       Recent Labs     11/12/22  2126 11/13/22  0804 11/13/22  1123 11/13/22  1638   POCGLU 96 89 86 104       Blood Sugar Average: Last 72 hrs:  (P) 370 [id number]   · Podiatry on board

## 2022-11-13 NOTE — QUICK NOTE
Meets sepsis criteria with fever, leukocytosis and tachypnea    Admitted 10/21 for sepsis thought to be 2/2 cellulitis from lower extremity wound, UA +Ecoli UTI; treated with zosyn x7d  BC on 10/21 no growth x5 days    · T Max 102 6  · Will obtain CMP, CBC, lactate, PCT, blood cx and CXR  · Rule out fever as a side effect of argatroban  · Give 1 dose IV CTX

## 2022-11-13 NOTE — ASSESSMENT & PLAN NOTE
Vitals:    11/13/22 0559 11/13/22 0806 11/13/22 0900 11/13/22 1458   BP:  156/72  155/68   BP Location:       Pulse:  85  90   Resp:  (!) 24  (!) 25   Temp:  98 4 °F (36 9 °C)     TempSrc:       SpO2:  92% 90% 94%   Weight: 116 kg (255 lb 11 7 oz)      Height:       Patient's blood pressure has been running high in the 180s, 190s SBP  Patient's home dose of amlodipine 10 mg, lisinopril 40 mg, metoprolol succinate 25 mg daily  Lisinopril was held due to NYDIA  Plan:  Continue amlodipine 10 mg once daily, continue Coreg 25 mg b i d  Doxazosin 2 mg daily  Continue hydralazine 10 mg every 6 hours p r n   For SBP greater than 180  Hold off home lisinopril per nephrology due to NYDIA

## 2022-11-13 NOTE — ASSESSMENT & PLAN NOTE
Lab Results   Component Value Date    HGB 7 2 (L) 11/13/2022    HGB 7 8 (L) 11/12/2022    HGB 8 2 (L) 11/11/2022     Hemoglobin fluctuating with baseline around 10  S/p 1 unit transfused 11/3/2022, H/H improved and has remained stable ever since  FOBT neg, no current source of bleeding, groin without hematoma  Continue to monitor H/H  Transfuse for Hgb less than 7

## 2022-11-13 NOTE — PROGRESS NOTES
[facility]  Progress Note - [address] 1960, 65 y o  female MRN: [id number]  Unit/Bed#: [place] 68 2 -01 Encounter: [id number]  Primary Care Provider: ALEX Morocho   Date and time admitted to hospital: 10/21/2022  7:58 PM    * CVA (cerebral vascular accident) [facility])  Assessment & Plan  · Due to change in mental status stat CT head ordered today  · CT showed moderate size acute/recent infarction centered on the right parietal lobe with local mass effect  No acute intracranial hemorrhage  · Stroke pathway  · CTA head and neck pending  Discussed with Nephrology, hold Bumex this afternoon  · Echo ordered  · Due to possible HIT, continue argatroban  · Stat repeat CT head with any new neurologic findings  · Discussed with Neurology, appreciate recommendations  · Continue statin and Plavix    PAD (peripheral artery disease) ([facility] [address] )  Assessment & Plan  · Patient has uncontrolled diabetes, hypertension, hyperlipidemia, patient was lost to follow-up and has not seen endocrinologist for over a year  Admits that she is poorly compliant with medication  Admitted due to Worsening lower extremity wounds     · S/p abdominal aortogram,  LLE SFA shockwave angioplasty/FATOU 10/31/22   · S/p R femoral endarterectomy 11/7  · S/p aspiration thrombectomy in right common iliac artery and left SFA  · Currently on argatroban drip, statin, Plavix  · Discussed with vascular surgery, patient will need bilateral above knee amputation    Thrombocytopenia ([facility] [address] )  Assessment & Plan  · Heparin drip switched to argatroban drip  · HIT panel pending    Acute respiratory failure with hypoxia ([facility] [address] )  Assessment & Plan  · Echo on 11/07 shows 19% LVEF, no diastolic dysfunction  · Postprocedural hypoxia  · She uses weaned down to 2 L of oxygen    UTI (urinary tract infection)  Assessment & Plan  · Patient spiked a fever, has leukocytosis  · UA positive  · Add on urine culture  · Blood cultures pending  · Continue ceftriaxone    Diabetic ulcer of heel associated with diabetes mellitus due to underlying condition [facility])  Assessment & Plan  Lab Results   Component Value Date    HGBA1C 9 8 (H) 10/25/2022       Recent Labs     11/12/22 2126 11/13/22  0804 11/13/22  1123 11/13/22  1638   POCGLU 96 89 86 104       Blood Sugar Average: Last 72 hrs:  (P) 834 [id number]   · Podiatry on board    Acute on chronic anemia  Assessment & Plan  Hemoglobin fluctuating with baseline around 10  S/p 1 unit transfused 11/3/2022  Hemoglobin 7 2 today  Transfuse if less than 7    Recent Labs     11/12/22  0424 11/13/22  0148   HGB 7 8* 7 2*         Type 2 diabetes mellitus with hyperglycemia, with long-term current use of insulin [facility])  Assessment & Plan  Lab Results   Component Value Date    HGBA1C 9 8 (H) 10/25/2022     Recent Labs     11/12/22 2126 11/13/22  0804 11/13/22  1123 11/13/22  1638   POCGLU 96 89 86 104     · Continue sliding scale and basal bolus protocol  · Change to lantus 15 units bedtime, sliding scale  · Continue insulin sliding scale    Benign essential hypertension  Assessment & Plan  · Keep blood pressure below 180 per Neurology      Depression, recurrent ([facility] [address] )  Assessment & Plan  · Evaluated by Psychiatry on this admission  · Continue Zoloft 150 mg daily  · Seroquel 25 mg q h s  VTE Pharmacologic Prophylaxis: VTE Score: 4 Moderate Risk (Score 3-4) - Pharmacological DVT Prophylaxis Ordered: argatroban  Patient Centered Rounds: I performed bedside rounds with nursing staff today  Discussions with Specialists or Other Care Team Provider:  Nursing, Neurology, Critical Care, vascular surgery    Education and Discussions with Family / Patient: Updated  (daughter) via phone  Time Spent for Care: 45 minutes  More than 50% of total time spent on counseling and coordination of care as described above      Current Length of Stay: 22 day(s)  Current Patient Status: Inpatient   Certification Statement: The patient will continue to require additional inpatient hospital stay due to Stroke, peripheral artery disease  Discharge Plan: Anticipate discharge in >72 hrs to discharge location to be determined pending rehab evaluations  Code Status: Level 1 - Full Code    Subjective:   Patient was seen and evaluated bedside  This morning she was lethargic  Most of the questions answered appropriately  Objective:     Vitals:   Temp (24hrs), Av °F (37 8 °C), Min:98 4 °F (36 9 °C), Max:102 6 °F (39 2 °C)    Temp:  [98 4 °F (36 9 °C)-102 6 °F (39 2 °C)] 98 4 °F (36 9 °C)  HR:  [83-90] 90  Resp:  [22-25] 25  BP: (152-159)/(68-72) 155/68  SpO2:  [90 %-94 %] 94 %  Body mass index is 44 24 kg/m²  Input and Output Summary (last 24 hours): Intake/Output Summary (Last 24 hours) at 2022 1721  Last data filed at 2022 1718  Gross per 24 hour   Intake 616 1 ml   Output 3000 ml   Net -2383 9 ml       Physical Exam:   Physical Exam  Constitutional:       Appearance: She is ill-appearing  Comments: Lethargic   HENT:      Head: Atraumatic  Cardiovascular:      Rate and Rhythm: Normal rate and regular rhythm  Heart sounds: No murmur heard  No friction rub  No gallop  Pulmonary:      Effort: Pulmonary effort is normal  No respiratory distress  Breath sounds: Normal breath sounds  No wheezing  Abdominal:      General: Bowel sounds are normal  There is no distension  Palpations: Abdomen is soft  Tenderness: There is no abdominal tenderness  Musculoskeletal:      Cervical back: Neck supple  Comments: Bilateral lower extremity mottling and blisters   Skin:     Comments: Bilateral lower extremity mottling and blisters   Neurological:      Mental Status: She is alert  Comments: Oriented to herself year, who is the president  Difficult neurologic exam due to patient's lethargy and pain  Muscle strength 4/5 in the left arm  Able to lift her left leg for a 2nd    Wiggling her right toe unable to move        Additional Data:     Labs:  Results from last 7 days   Lab Units 11/13/22  0148 11/08/22  0532 11/07/22  1520   WBC Thousand/uL 15 00*   < > 13 88*   HEMOGLOBIN g/dL 7 2*   < > 8 7*   HEMATOCRIT % 22 8*   < > 27 2*   PLATELETS Thousands/uL 67*   < > 157   BANDS PCT %  --   --  2   NEUTROS PCT % 84*  --   --    LYMPHS PCT % 5*  --   --    LYMPHO PCT %  --   --  5*   MONOS PCT % 9  --   --    MONO PCT %  --   --  3*   EOS PCT % 1  --  3    < > = values in this interval not displayed       Results from last 7 days   Lab Units 11/13/22  0148   SODIUM mmol/L 140   POTASSIUM mmol/L 3 1*   CHLORIDE mmol/L 102   CO2 mmol/L 31   BUN mg/dL 16   CREATININE mg/dL 1 08   ANION GAP mmol/L 7   CALCIUM mg/dL 7 8*   ALBUMIN g/dL 1 5*   TOTAL BILIRUBIN mg/dL 0 67   ALK PHOS U/L 232*   ALT U/L 53   AST U/L 114*   GLUCOSE RANDOM mg/dL 85     Results from last 7 days   Lab Units 11/11/22  1918   INR  3 70*     Results from last 7 days   Lab Units 11/13/22  1638 11/13/22  1123 11/13/22  0804 11/12/22  2126 11/12/22  1631 11/12/22  1144 11/12/22  0804 11/11/22  2104 11/11/22  1545 11/11/22  1210 11/11/22  0715 11/10/22  2149   POC GLUCOSE mg/dl 104 86 89 96 100 113 111 141* 161* 142* 146* 173*         Results from last 7 days   Lab Units 11/13/22  0148   LACTIC ACID mmol/L 1 0   PROCALCITONIN ng/ml 0 48*       Lines/Drains:  Invasive Devices  Report    Peripheral Intravenous Line  Duration           Peripheral IV 11/09/22 Left;Proximal;Upper;Ventral (anterior) Arm 4 days    Peripheral IV 11/10/22 Right;Ventral (anterior) Forearm 3 days    Peripheral IV 11/13/22 Right;Ventral (anterior) Forearm <1 day          Drain  Duration           Urethral Catheter Latex 16 Fr  6 days              Urinary Catheter:  Goal for removal: Keep the [place] for now           Telemetry:  Telemetry Orders (From admission, onward)             48 Hour Telemetry Monitoring  Continuous x 48 hours        References:    Telemetry Guidelines   Question:  Reason for 48 Hour Telemetry  Answer:  Acute CVA (<24 hrs old, hemispheric strokes, selected brainstem strokes, cardiac arrhythmias)                 Telemetry Reviewed: Normal Sinus Rhythm  Indication for Continued Telemetry Use: Acute CVA             Imaging: Reviewed radiology reports from this admission including: CT head    Recent Cultures (last 7 days):   Results from last 7 days   Lab Units 11/13/22  0109   BLOOD CULTURE  Received in Microbiology Lab  Culture in Progress  Received in Microbiology Lab  Culture in Progress         Last 24 Hours Medication List:   Current Facility-Administered Medications   Medication Dose Route Frequency Provider Last Rate   • acetaminophen  650 mg Oral Q6H PRN Toshia Sarmiento MD     • acetaminophen  650 mg Oral Q8H Sergio Kohler MD     • amLODIPine  10 mg Oral Daily Toshia Sarmiento MD     • argatroban  0 1-3 mcg/kg/min Intravenous Titrated ALEX Shah 0 02 mcg/kg/min (11/13/22 1503)   • atorvastatin  40 mg Oral Daily With Radha Torrez MD     • carvedilol  25 mg Oral BID With Meals Toshia Sarmiento MD     • cefTRIAXone  2,000 mg Intravenous Q24H ALEX Harper 2,000 mg (11/13/22 0253)   • clopidogrel  75 mg Oral Daily Toshia Sarmiento MD     • doxazosin  2 mg Oral Daily Toshia Sarmiento MD     • gabapentin  200 mg Oral BID Toshia Sarmiento MD     • hydrALAZINE  10 mg Intravenous Q6H PRN oTshia Sarmiento MD     • HYDROmorphone  0 5 mg Intravenous Q4H PRN ALEX Harper     • insulin glargine  15 Units Subcutaneous HS Zenaida Kellogg MD     • insulin lispro  2-12 Units Subcutaneous TID AC Toshia Sarmiento MD     • LORazepam  0 5 mg Oral Q8H PRN Toshia Sarmiento MD     • metoclopramide  5 mg Intravenous Q6H PRN Toshia Sarmiento MD     • naloxone  0 04 mg Intravenous Q1MIN PRN Chi Rosas MD     • nystatin   Topical BID Chi Rosas MD     • ondansetron  4 mg Intravenous Q6H PRN Chi Rosas MD     • oxyCODONE  10 mg Oral Q4H PRN Chi Rosas MD     • oxyCODONE  5 mg Oral Q4H PRN Chi Rosas MD     • pantoprazole  40 mg Oral Early Morning Chi Rosas MD     • polyethylene glycol  17 g Oral Daily PRN ALEX Giordano     • potassium chloride  40 mEq Oral Once [facility], PA-C     • QUEtiapine  25 mg Oral HS ALEX Giordano     • sertraline  150 mg Oral Daily ALEX Giordano          Today, Patient Was Seen By: Germán Fernández    **Please Note: This note may have been constructed using a voice recognition system  **

## 2022-11-13 NOTE — PROGRESS NOTES
Progress Note - Vascular Surgery   Feli Elena 65 y o  female MRN: [id number]  Unit/Bed#: [place] [address] -01 Encounter: [id number]    Assessment:  64yo F with PAD s/p multiple BLE endovascular interventions c/b atheroembolic events to BLE, now with subacute right-sided CVA on CT head      Plan:  - appreciate care per ICU team  - appreciate neurology input regarding CVA  - f/u HIT assays  - will continue discussion regarding limb salvageability and possible need for amputation  - please tigertext the vascular surgery role with any questions    Subjective/Objective   Subjective:   No acute events overnight  Endorses pain in her back, but interval history limited due to acute mental change following CVA  Objective:     Blood pressure (!) 173/82, pulse 88, temperature 98 2 °F (36 8 °C), temperature source Oral, resp  rate (!) 31, height 5' 3 75" (1 619 m), weight 116 kg (255 lb 11 7 oz), SpO2 96 %, not currently breastfeeding  ,Body mass index is 44 24 kg/m²  Intake/Output Summary (Last 24 hours) at 11/14/2022 0708  Last data filed at 11/14/2022 0400  Gross per 24 hour   Intake 242 76 ml   Output 1430 ml   Net -1187 24 ml       Invasive Devices  Report    Peripheral Intravenous Line  Duration           Peripheral IV 11/09/22 Left;Proximal;Upper;Ventral (anterior) Arm 4 days    Peripheral IV 11/13/22 Right;Ventral (anterior) Forearm <1 day          Drain  Duration           Urethral Catheter Latex 16 Fr  6 days                Physical Exam:  General: No acute distress  Neuro: alert and oriented, following commands for BUE, patient responding to internal stimuli with noncoherent thoughts  HEENT: moist mucous membranes  CV: Well perfused, regular rate and rhythm  Lungs: Normal work of breathing, no increased respiratory effort  Abdomen: Soft, non-tender, non-distended     Extremities: BLE with blisters and poorly healing wounds, doppler signals of RLE AT/PT and LLE DP/PT/AT, motor and sensory exam with limited cooperation  Skin: Warm, dry      Tia Hernandez MD  General Surgery PGY1

## 2022-11-13 NOTE — NURSING NOTE
Patient difficult blood draw, blood obtained by ICU Halle Hartley with IV US  New IV also placed  Patient to be transferred to ICU  Awaiting bed availability       Ophelia Branch 11/13/2022 6:43 PM

## 2022-11-13 NOTE — ASSESSMENT & PLAN NOTE
· Echo on 11/07 shows 43% LVEF, no diastolic dysfunction  · Postprocedural hypoxia  · She uses weaned down to 2 L of oxygen

## 2022-11-13 NOTE — ASSESSMENT & PLAN NOTE
Lab Results   Component Value Date    HGBA1C 9 8 (H) 10/25/2022     Recent Labs     11/12/22  2126 11/13/22  0804 11/13/22  1123 11/13/22  1638   POCGLU 96 89 86 104     · Continue sliding scale and basal bolus protocol  · Change to lantus 15 units bedtime, sliding scale  · Continue insulin sliding scale

## 2022-11-14 ENCOUNTER — APPOINTMENT (INPATIENT)
Dept: NON INVASIVE DIAGNOSTICS | Facility: HOSPITAL | Age: 62
DRG: 853 | End: 2022-11-14
Payer: COMMERCIAL

## 2022-11-14 ENCOUNTER — APPOINTMENT (INPATIENT)
Dept: CT IMAGING | Facility: HOSPITAL | Age: 62
DRG: 853 | End: 2022-11-14
Payer: COMMERCIAL

## 2022-11-14 PROBLEM — R19.7 DIARRHEA: Status: ACTIVE | Noted: 2022-11-14

## 2022-11-14 LAB
ALBUMIN SERPL ELPH-MCNC: 1.8 G/DL (ref 3.5–5)
ALBUMIN SERPL ELPH-MCNC: 34.7 % (ref 52–65)
ALPHA1 GLOB SERPL ELPH-MCNC: 0.74 G/DL (ref 0.1–0.4)
ALPHA1 GLOB SERPL ELPH-MCNC: 14.3 % (ref 2.5–5)
ALPHA2 GLOB SERPL ELPH-MCNC: 1.07 G/DL (ref 0.4–1.2)
ALPHA2 GLOB SERPL ELPH-MCNC: 20.6 % (ref 7–13)
ANION GAP SERPL CALCULATED.3IONS-SCNC: 10 MMOL/L (ref 4–13)
AORTIC ROOT: 2.7 CM
AORTIC VALVE MEAN VELOCITY: 10.8 M/S
APTT PPP: 107 SECONDS (ref 23–37)
APTT PPP: 108 SECONDS (ref 23–37)
APTT PPP: 108 SECONDS (ref 23–37)
APTT PPP: 94 SECONDS (ref 23–37)
APTT PPP: 97 SECONDS (ref 23–37)
ATRIAL RATE: 81 BPM
AV LVOT MEAN GRADIENT: 3 MMHG
AV LVOT PEAK GRADIENT: 6 MMHG
AV MEAN GRADIENT: 5 MMHG
AV PEAK GRADIENT: 10 MMHG
AV VELOCITY RATIO: 0.79
BASOPHILS # BLD MANUAL: 0 THOUSAND/UL (ref 0–0.1)
BASOPHILS NFR MAR MANUAL: 0 % (ref 0–1)
BETA GLOB ABNORMAL SERPL ELPH-MCNC: 0.34 G/DL (ref 0.4–0.8)
BETA1 GLOB SERPL ELPH-MCNC: 6.6 % (ref 5–13)
BETA2 GLOB SERPL ELPH-MCNC: 9.6 % (ref 2–8)
BETA2+GAMMA GLOB SERPL ELPH-MCNC: 0.5 G/DL (ref 0.2–0.5)
BUN SERPL-MCNC: 12 MG/DL (ref 5–25)
CALCIUM SERPL-MCNC: 8 MG/DL (ref 8.3–10.1)
CHLORIDE SERPL-SCNC: 102 MMOL/L (ref 96–108)
CHOLEST SERPL-MCNC: 102 MG/DL
CK MB SERPL-MCNC: 12 NG/ML (ref 0–5)
CK MB SERPL-MCNC: <1 % (ref 0–2.5)
CK SERPL-CCNC: 2728 U/L (ref 26–192)
CO2 SERPL-SCNC: 28 MMOL/L (ref 21–32)
CREAT SERPL-MCNC: 1.11 MG/DL (ref 0.6–1.3)
DOP CALC AO PEAK VEL: 1.6 M/S
DOP CALC AO VTI: 32.57 CM
DOP CALC LVOT PEAK VEL VTI: 26.81 CM
DOP CALC LVOT PEAK VEL: 1.26 M/S
E WAVE DECELERATION TIME: 197 MS
EOSINOPHIL # BLD MANUAL: 0.17 THOUSAND/UL (ref 0–0.4)
EOSINOPHIL NFR BLD MANUAL: 1 % (ref 0–6)
ERYTHROCYTE [DISTWIDTH] IN BLOOD BY AUTOMATED COUNT: 15.7 % (ref 11.6–15.1)
FRACTIONAL SHORTENING: 35 % (ref 28–44)
GAMMA GLOB ABNORMAL SERPL ELPH-MCNC: 0.74 G/DL (ref 0.5–1.6)
GAMMA GLOB SERPL ELPH-MCNC: 14.2 % (ref 12–22)
GFR SERPL CREATININE-BSD FRML MDRD: 53 ML/MIN/1.73SQ M
GLUCOSE SERPL-MCNC: 104 MG/DL (ref 65–140)
GLUCOSE SERPL-MCNC: 107 MG/DL (ref 65–140)
GLUCOSE SERPL-MCNC: 146 MG/DL (ref 65–140)
GLUCOSE SERPL-MCNC: 186 MG/DL (ref 65–140)
GLUCOSE SERPL-MCNC: 204 MG/DL (ref 65–140)
HCT VFR BLD AUTO: 21.9 % (ref 34.8–46.1)
HDLC SERPL-MCNC: 28 MG/DL
HGB BLD-MCNC: 7 G/DL (ref 11.5–15.4)
HYPERCHROMIA BLD QL SMEAR: PRESENT
IGG/ALB SER: 0.53 {RATIO} (ref 1.1–1.8)
INTERPRETATION UR IFE-IMP: NORMAL
INTERVENTRICULAR SEPTUM IN DIASTOLE (PARASTERNAL SHORT AXIS VIEW): 1.3 CM
INTERVENTRICULAR SEPTUM: 1.3 CM (ref 0.6–1.1)
KAPPA LC FREE SER-MCNC: 71.7 MG/L (ref 3.3–19.4)
KAPPA LC FREE/LAMBDA FREE SER: 2.12 {RATIO} (ref 0.26–1.65)
LAMBDA LC FREE SERPL-MCNC: 33.8 MG/L (ref 5.7–26.3)
LDLC SERPL CALC-MCNC: 55 MG/DL (ref 0–100)
LEFT ATRIUM SIZE: 4.2 CM
LEFT INTERNAL DIMENSION IN SYSTOLE: 3 CM (ref 2.1–4)
LEFT VENTRICULAR INTERNAL DIMENSION IN DIASTOLE: 4.6 CM (ref 3.5–6)
LEFT VENTRICULAR POSTERIOR WALL IN END DIASTOLE: 1.2 CM
LEFT VENTRICULAR STROKE VOLUME: 63 ML
LVSV (TEICH): 63 ML
LYMPHOCYTES # BLD AUTO: 0.66 THOUSAND/UL (ref 0.6–4.47)
LYMPHOCYTES # BLD AUTO: 4 % (ref 14–44)
MCH RBC QN AUTO: 27.8 PG (ref 26.8–34.3)
MCHC RBC AUTO-ENTMCNC: 32 G/DL (ref 31.4–37.4)
MCV RBC AUTO: 87 FL (ref 82–98)
MONOCYTES # BLD AUTO: 0.5 THOUSAND/UL (ref 0–1.22)
MONOCYTES NFR BLD: 3 % (ref 4–12)
MV PEAK A VEL: 1 M/S
MV PEAK E VEL: 147 CM/S
MV STENOSIS PRESSURE HALF TIME: 57 MS
MV VALVE AREA P 1/2 METHOD: 3.86 CM2
NEUTROPHILS # BLD MANUAL: 15.24 THOUSAND/UL (ref 1.85–7.62)
NEUTS SEG NFR BLD AUTO: 92 % (ref 43–75)
P AXIS: 49 DEGREES
PF4 HEPARIN CMPLX AB SER-ACNC: 1.96 OD (ref 0–0.4)
PLATELET # BLD AUTO: 158 THOUSANDS/UL (ref 149–390)
PLATELET BLD QL SMEAR: ADEQUATE
PMV BLD AUTO: 10 FL (ref 8.9–12.7)
POTASSIUM SERPL-SCNC: 3.2 MMOL/L (ref 3.5–5.3)
PR INTERVAL: 133 MS
PROCALCITONIN SERPL-MCNC: 1 NG/ML
PROT PATTERN SERPL ELPH-IMP: ABNORMAL
PROT SERPL-MCNC: 5.2 G/DL (ref 6.4–8.2)
QRS AXIS: 44 DEGREES
QRSD INTERVAL: 79 MS
QT INTERVAL: 383 MS
QTC INTERVAL: 445 MS
RBC # BLD AUTO: 2.52 MILLION/UL (ref 3.81–5.12)
RBC MORPH BLD: PRESENT
SL CV LV EF: 61
SL CV PED ECHO LEFT VENTRICLE DIASTOLIC VOLUME (MOD BIPLANE) 2D: 99 ML
SL CV PED ECHO LEFT VENTRICLE SYSTOLIC VOLUME (MOD BIPLANE) 2D: 36 ML
SODIUM SERPL-SCNC: 140 MMOL/L (ref 135–147)
T WAVE AXIS: 67 DEGREES
TR MAX PG: 29 MMHG
TR PEAK VELOCITY: 2.7 M/S
TRICUSPID VALVE PEAK REGURGITATION VELOCITY: 2.67 M/S
TRIGL SERPL-MCNC: 93 MG/DL
VENTRICULAR RATE: 81 BPM
WBC # BLD AUTO: 16.57 THOUSAND/UL (ref 4.31–10.16)

## 2022-11-14 PROCEDURE — 93308 TTE F-UP OR LMTD: CPT

## 2022-11-14 PROCEDURE — 84145 PROCALCITONIN (PCT): CPT | Performed by: NURSE PRACTITIONER

## 2022-11-14 PROCEDURE — 92610 EVALUATE SWALLOWING FUNCTION: CPT

## 2022-11-14 PROCEDURE — 82948 REAGENT STRIP/BLOOD GLUCOSE: CPT

## 2022-11-14 PROCEDURE — 93010 ELECTROCARDIOGRAM REPORT: CPT | Performed by: INTERNAL MEDICINE

## 2022-11-14 PROCEDURE — 93321 DOPPLER ECHO F-UP/LMTD STD: CPT | Performed by: INTERNAL MEDICINE

## 2022-11-14 PROCEDURE — 86334 IMMUNOFIX E-PHORESIS SERUM: CPT | Performed by: PATHOLOGY

## 2022-11-14 PROCEDURE — 85027 COMPLETE CBC AUTOMATED: CPT

## 2022-11-14 PROCEDURE — 84165 PROTEIN E-PHORESIS SERUM: CPT | Performed by: PATHOLOGY

## 2022-11-14 PROCEDURE — 93325 DOPPLER ECHO COLOR FLOW MAPG: CPT | Performed by: INTERNAL MEDICINE

## 2022-11-14 PROCEDURE — 99232 SBSQ HOSP IP/OBS MODERATE 35: CPT | Performed by: INTERNAL MEDICINE

## 2022-11-14 PROCEDURE — 85730 THROMBOPLASTIN TIME PARTIAL: CPT | Performed by: INTERNAL MEDICINE

## 2022-11-14 PROCEDURE — 93325 DOPPLER ECHO COLOR FLOW MAPG: CPT

## 2022-11-14 PROCEDURE — 93321 DOPPLER ECHO F-UP/LMTD STD: CPT

## 2022-11-14 PROCEDURE — 80061 LIPID PANEL: CPT | Performed by: NURSE PRACTITIONER

## 2022-11-14 PROCEDURE — 82550 ASSAY OF CK (CPK): CPT

## 2022-11-14 PROCEDURE — 93308 TTE F-UP OR LMTD: CPT | Performed by: INTERNAL MEDICINE

## 2022-11-14 PROCEDURE — 80048 BASIC METABOLIC PNL TOTAL CA: CPT | Performed by: NURSE PRACTITIONER

## 2022-11-14 PROCEDURE — 70450 CT HEAD/BRAIN W/O DYE: CPT

## 2022-11-14 PROCEDURE — 82553 CREATINE MB FRACTION: CPT

## 2022-11-14 PROCEDURE — 85730 THROMBOPLASTIN TIME PARTIAL: CPT | Performed by: HOSPITALIST

## 2022-11-14 PROCEDURE — 99232 SBSQ HOSP IP/OBS MODERATE 35: CPT | Performed by: SURGERY

## 2022-11-14 PROCEDURE — G1004 CDSM NDSC: HCPCS

## 2022-11-14 PROCEDURE — 85007 BL SMEAR W/DIFF WBC COUNT: CPT

## 2022-11-14 PROCEDURE — 93005 ELECTROCARDIOGRAM TRACING: CPT

## 2022-11-14 PROCEDURE — 99232 SBSQ HOSP IP/OBS MODERATE 35: CPT | Performed by: PSYCHIATRY & NEUROLOGY

## 2022-11-14 RX ORDER — SENNOSIDES 8.6 MG
1 TABLET ORAL
Status: DISCONTINUED | OUTPATIENT
Start: 2022-11-14 | End: 2022-11-14

## 2022-11-14 RX ORDER — ACETAMINOPHEN 325 MG/1
650 TABLET ORAL EVERY 6 HOURS PRN
Status: DISCONTINUED | OUTPATIENT
Start: 2022-11-14 | End: 2022-11-18

## 2022-11-14 RX ORDER — POLYETHYLENE GLYCOL 3350 17 G/17G
17 POWDER, FOR SOLUTION ORAL DAILY PRN
Status: DISCONTINUED | OUTPATIENT
Start: 2022-11-14 | End: 2022-11-25

## 2022-11-14 RX ORDER — SODIUM CHLORIDE, SODIUM LACTATE, POTASSIUM CHLORIDE, CALCIUM CHLORIDE 600; 310; 30; 20 MG/100ML; MG/100ML; MG/100ML; MG/100ML
125 INJECTION, SOLUTION INTRAVENOUS CONTINUOUS
Status: DISCONTINUED | OUTPATIENT
Start: 2022-11-14 | End: 2022-11-16

## 2022-11-14 RX ORDER — POLYETHYLENE GLYCOL 3350 17 G/17G
17 POWDER, FOR SOLUTION ORAL DAILY
Status: DISCONTINUED | OUTPATIENT
Start: 2022-11-14 | End: 2022-11-14

## 2022-11-14 RX ORDER — SENNOSIDES 8.6 MG
1 TABLET ORAL
Status: DISCONTINUED | OUTPATIENT
Start: 2022-11-14 | End: 2022-11-17

## 2022-11-14 RX ORDER — POTASSIUM CHLORIDE 20MEQ/15ML
40 LIQUID (ML) ORAL ONCE
Status: COMPLETED | OUTPATIENT
Start: 2022-11-14 | End: 2022-11-14

## 2022-11-14 RX ORDER — POTASSIUM CHLORIDE 14.9 MG/ML
20 INJECTION INTRAVENOUS
Status: COMPLETED | OUTPATIENT
Start: 2022-11-14 | End: 2022-11-14

## 2022-11-14 RX ORDER — INSULIN LISPRO 100 [IU]/ML
1-6 INJECTION, SOLUTION INTRAVENOUS; SUBCUTANEOUS
Status: DISCONTINUED | OUTPATIENT
Start: 2022-11-14 | End: 2023-01-12 | Stop reason: HOSPADM

## 2022-11-14 RX ORDER — CHLORHEXIDINE GLUCONATE 0.12 MG/ML
15 RINSE ORAL ONCE
Status: DISCONTINUED | OUTPATIENT
Start: 2022-11-14 | End: 2022-11-22 | Stop reason: SDUPTHER

## 2022-11-14 RX ADMIN — CEFTRIAXONE SODIUM 2000 MG: 10 INJECTION, POWDER, FOR SOLUTION INTRAVENOUS at 01:34

## 2022-11-14 RX ADMIN — NYSTATIN: 100000 POWDER TOPICAL at 10:43

## 2022-11-14 RX ADMIN — POTASSIUM CHLORIDE 40 MEQ: 20 SOLUTION ORAL at 11:58

## 2022-11-14 RX ADMIN — NYSTATIN: 100000 POWDER TOPICAL at 17:27

## 2022-11-14 RX ADMIN — POTASSIUM CHLORIDE 20 MEQ: 14.9 INJECTION, SOLUTION INTRAVENOUS at 10:43

## 2022-11-14 RX ADMIN — POTASSIUM CHLORIDE 20 MEQ: 14.9 INJECTION, SOLUTION INTRAVENOUS at 09:11

## 2022-11-14 RX ADMIN — INSULIN GLARGINE 15 UNITS: 100 INJECTION, SOLUTION SUBCUTANEOUS at 22:19

## 2022-11-14 RX ADMIN — GABAPENTIN 200 MG: 100 CAPSULE ORAL at 10:36

## 2022-11-14 RX ADMIN — CARVEDILOL 25 MG: 25 TABLET, FILM COATED ORAL at 10:37

## 2022-11-14 RX ADMIN — ARGATROBAN 0.1 MCG/KG/MIN: 50 INJECTION, SOLUTION INTRAVENOUS at 09:17

## 2022-11-14 RX ADMIN — OXYCODONE HYDROCHLORIDE 10 MG: 10 TABLET ORAL at 18:46

## 2022-11-14 RX ADMIN — ATORVASTATIN CALCIUM 40 MG: 40 TABLET, FILM COATED ORAL at 17:25

## 2022-11-14 RX ADMIN — QUETIAPINE FUMARATE 25 MG: 25 TABLET ORAL at 22:19

## 2022-11-14 RX ADMIN — SERTRALINE HYDROCHLORIDE 150 MG: 100 TABLET ORAL at 10:37

## 2022-11-14 RX ADMIN — GABAPENTIN 200 MG: 100 CAPSULE ORAL at 17:25

## 2022-11-14 RX ADMIN — AMLODIPINE BESYLATE 10 MG: 10 TABLET ORAL at 10:37

## 2022-11-14 RX ADMIN — CLOPIDOGREL BISULFATE 75 MG: 75 TABLET ORAL at 10:37

## 2022-11-14 NOTE — ASSESSMENT & PLAN NOTE
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, also cannot exclude septic emboli as the etiology  Intracranial imaging to be completed to rule out significant stenosis or occlusion  · Neuro following  · Transfer patient back to the intensive care unit; neuro recommendations appreciated  ·   · MRI brain with contrast when able and safe to perform  · Reviewed with medicine team, cleared by nephrology for CTA of head and neck - this will be completed today  · Echo completed, as above - recommend repeat echo to rule out development of thrombus or vegetation  · Lipid Panel  · Hemoglobin A1c - 9 8H  · Plavix and Agatroban, currently - the plan is to continue this medication - she is at risk for bleed, but there is risk for more embolic events off of this medication, risk /benefit - recommend frequent neurological checks and stat ct of the head with any changes in examination  · Atorvastatin 40 mg  · Avoid hypotension, blood pressure goal per neuro-perspective [phone] systolic     · Continue telemetry  · PT/OT/ST  · Repeat CT

## 2022-11-14 NOTE — ASSESSMENT & PLAN NOTE
Echo on 11/07 shows 01% LVEF, no diastolic dysfunction  After the procedure patient was noted to have desaturated to 85% SpO2, and needed 5 L via face mask to saturate greater than 90%  Oxygenation improved, clinically improved  Continue to wean

## 2022-11-14 NOTE — NURSING NOTE
RN had discussion with SLIM, Vascular, and Pharmacy  No clear answer as to how to adjust argatroban drip as it is running at the lowest possible rate allowed by IV Pump; this cannot be undone by override  RN was not given any clear guidance as to what steps to take except to keep drip running  Will keep drip running at 0 02 mcg/kg/min as this is lowest rate it can be run and PTTs remain over therapeutic      Ophelia Lopez Rule 11/13/2022 7:47 PM

## 2022-11-14 NOTE — ASSESSMENT & PLAN NOTE
Lab Results   Component Value Date    HGBA1C 9 8 (H) 10/25/2022       Recent Labs     11/13/22  0804 11/13/22  1123 11/13/22  1638 11/14/22  0636   POCGLU 89 86 104 107       Blood Sugar Average: Last 72 hrs:  (P) 359 [id number]   Patient's home medication 60 units glargine at bedtime, Humalog 20 units t i d     Glargine to 15 units PLUS ss  Pt on CLD

## 2022-11-14 NOTE — ASSESSMENT & PLAN NOTE
Significant reduction in platelet > 50 % in the past 4 days  Patient started on Heparin infusion on 11/1 and SQ heparin prior  4Ts score 5- intermediate probability for HIIT  Ruled out DIC/DITP/   Thrombocytopenia could be due to heparin   less likely infectious origin, less likely bone marrow suppression with normal cell lines, normal liver function  Plan  DIC Screen- FDP, D Dimers, hemolysis screen, fibrinogen  HIT platelet antibody  Antithrombin III  Heparin gtt discontinued and Argatroban initiated

## 2022-11-14 NOTE — ASSESSMENT & PLAN NOTE
Lab Results   Component Value Date    CREATININE 1 08 11/13/2022    CREATININE 1 03 11/12/2022    CREATININE 1 00 11/11/2022       Lab Results   Component Value Date    EGFR 55 11/13/2022    EGFR 58 11/12/2022    EGFR 60 11/11/2022       • Patient admitted on 10/22 POA1 44 bl <1 1  Nephrology was following    Creatinine  Has trended down   • Prerenal secondary to sepsis secondary to UTI, use of Ace inhibitor  • Appears to be resolved and currently you around baseline levels    Plan:  • UA w/ microscopic, Urinary retention protocol, Bladder scan, Daily weights, I/O  • Continue sodium chloride infusion 75 mL/hour  • Monitor BMP daily and observe for downward trend of creatinine  • Avoid hypoperfusion of the kidneys, minimize nephrotoxins  • RAAS Blockers/Diuretics held:  Patient's home lisinopril is being held

## 2022-11-14 NOTE — ASSESSMENT & PLAN NOTE
Lab Results   Component Value Date    HGB 7 2 (L) 11/13/2022    HGB 7 8 (L) 11/12/2022    HGB 8 2 (L) 11/11/2022     Hemoglobin fluctuating with baseline around 10- no overt bleeding  Aetiology ; chronic disease, and also due to sepsis    S/p 1 unit transfused 11/3/2022, H/H improved and has remained stable ever since  FOBT neg, no current source of bleeding, groin without hematoma  Continue to monitor H/H  Transfuse for Hgb less than 7

## 2022-11-14 NOTE — ASSESSMENT & PLAN NOTE
Lab Results   Component Value Date    WBC 16 57 (H) 11/14/2022    WBC 15 00 (H) 11/13/2022    WBC 15 36 (H) 11/12/2022     Echo on 11/07 shows 92% LVEF, no diastolic dysfunction  After the procedure patient was noted to have desaturated to 85% SpO2, and needed 5 L via face mask to saturate greater than 90%  Weaned down to 3 L saturating 95%   -   Continue to wean as able  Incentive spirometry and airway protocol

## 2022-11-14 NOTE — CASE MANAGEMENT
Case Management Discharge Planning Note    Patient name Lilliana Trujillo  Location ICU 03/ICU 10 MRN [id number]  : 1960 Date 2022       Current Admission Date: 10/21/2022  Current Admission Diagnosis:Stroke [facility])   Patient Active Problem List    Diagnosis Date Noted   • Diarrhea 2022   • CVA (cerebral vascular accident) ([facility] [address] ) 2022   • UTI (urinary tract infection) 2022   • Thrombocytopenia ([address] ) 2022   • Diabetic ulcer of heel associated with diabetes mellitus due to underlying condition ([address] ) 11/10/2022   • Acute on chronic anemia 10/30/2022   • Generalized edema due to fluid overload 10/27/2022   • PAD (peripheral artery disease) ([facility] [address] ) 10/25/2022   • Non healing left heel wound 10/22/2022   • Hypertensive urgency 10/22/2022   • Hypokalemia 10/22/2022   • Wound of lower extremity 10/21/2022   • Epigastric pain 2022   • Type 2 diabetes mellitus with hyperglycemia, with long-term current use of insulin ([facility] [address] ) 10/18/2021   • Hyperparathyroidism ([facility] [address] ) 10/18/2021   • 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 ([facility] [address] ) 2021   • 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   • Moderate protein-calorie malnutrition ([facility] [address] ) 2020   • Acute colitis 2020   • Arthritis 2019   • Depression, recurrent ([facility] [address] ) 2018   • Class 3 severe obesity due to excess calories with serious comorbidity and body mass index (BMI) of 40 0 to 44 9 in adult ([facility] [address] ) 2018   • Acute respiratory failure with hypoxia ([facility] [address] ) 2018   • Esophageal reflux 2018   • Uncontrolled type 2 diabetes with neuropathy 2018   • Diabetic peripheral neuropathy ([facility] [address] )    • Systolic murmur    • Stroke ([facility] [address] ) 2015   • Anxiety 2015   • Vitamin D deficiency 2015   • Benign essential hypertension 2012   • Familial combined hyperlipidemia 2012      LOS (days): 23  Geometric Mean LOS (GMLOS) (days):   Days to GMLOS:     OBJECTIVE:  Risk of Unplanned Readmission Score: 31 35         Current admission status: Inpatient   Preferred Pharmacy:   [id number], [place] - [address]  [address]  [address]  Phone: [phone] Fax: [phone]    Primary Care Provider: ALEX Sapp    Primary Insurance: BLUE CROSS  Secondary Insurance:     DISCHARGE DETAILS:    Discharge planning discussed with[de-identified] Patient's daughter  Freedom of Choice: Yes  Comments - Freedom of Choice: Patient's daughter expressed concern for patient after dc now that patient may need one or both legs amputated  Daughter stated patient cannot live at her current address as it is not amputee accesible, and patient cannot live with family  At this time, Acute rehab referrals not appropriate d/t family's desire for long-term care  CM contacted family/caregiver?: Yes  Were Treatment Team discharge recommendations reviewed with patient/caregiver?: Yes  Did patient/caregiver verbalize understanding of patient care needs?: N/A- going to facility  Were patient/caregiver advised of the risks associated with not following Treatment Team discharge recommendations?: Yes    Contacts  Patient Contacts: Yaw Saab (dtr)  Relationship to Patient[de-identified] Family  Contact Method: Phone  Phone Number: [phone]  Reason/Outcome: Emergency Contact, Discharge Planning, Referral              Other Referral/Resources/Interventions Provided:  Interventions: SNF  Referral Comments: With daughter's permission, radius expanded to consider other facilities for LTC                                                      Additional Comments: CM discussed need for MA-51/PASSR to be completed for LTC eligibility determination  Daughter expressed understanding and gave consent for CM to provided necessary info to the county   Daughter expressed concern for payment for LTC d/t patient's low income ($1,200 a month pension), has no assets, and a negative bank balance  Family also does not have funds to pay for an assisted living facility  CM informed daughter that patient will be evaluated for Medicaid eligibility when optioned

## 2022-11-14 NOTE — PLAN OF CARE
Problem: PAIN - ADULT  Goal: Verbalizes/displays adequate comfort level or baseline comfort level  Description: Interventions:  - Encourage patient to monitor pain and request assistance  - Assess pain using appropriate pain scale  - Administer analgesics based on type and severity of pain and evaluate response  - Implement non-pharmacological measures as appropriate and evaluate response  - Consider cultural and social influences on pain and pain management  - Notify physician/advanced practitioner if interventions unsuccessful or patient reports new pain  Outcome: Progressing     Problem: INFECTION - ADULT  Goal: Absence or prevention of progression during hospitalization  Description: INTERVENTIONS:  - Assess and monitor for signs and symptoms of infection  - Monitor lab/diagnostic results  - Monitor all insertion sites, i e  indwelling lines, tubes, and drains  - Administer medications as ordered  - Instruct and encourage patient and family to use good hand hygiene technique  Outcome: Progressing     Problem: DISCHARGE PLANNING  Goal: Discharge to home or other facility with appropriate resources  Description: INTERVENTIONS:  - Identify barriers to discharge w/patient   - Arrange for needed discharge resources and transportation as appropriate  - Identify discharge learning needs  - Refer to Case Management Department for coordinating discharge planning if the patient needs post-hospital services based on physician/advanced practitioner order   Outcome: Progressing     Problem: Knowledge Deficit  Goal: Patient/family/caregiver demonstrates understanding of disease process, treatment plan, medications, and discharge instructions  Description: Complete learning assessment and assess knowledge base    Interventions:  - Provide teaching at level of understanding  - Provide teaching via preferred learning methods  Outcome: Progressing     Problem: METABOLIC, FLUID AND ELECTROLYTES - ADULT  Goal: Electrolytes maintained within normal limits  Description: INTERVENTIONS:  - Monitor labs and assess patient for signs and symptoms of electrolyte imbalances  - Administer electrolyte replacement as ordered  - Monitor response to electrolyte replacements, including repeat lab results as appropriate  - Instruct patient on fluid and nutrition as appropriate  Outcome: Progressing  Goal: Fluid balance maintained  Description: INTERVENTIONS:  - Monitor labs   - Monitor I/O and WT  - Instruct patient on fluid and nutrition as appropriate  - Assess for signs & symptoms of volume excess or deficit  Outcome: Progressing  Goal: Glucose maintained within target range  Description: INTERVENTIONS:  - Monitor Blood Glucose as ordered  - Assess for signs and symptoms of hyperglycemia and hypoglycemia  - Administer ordered medications to maintain glucose within target range  - Assess nutritional intake and initiate nutrition service referral as needed  Outcome: Progressing     Problem: SKIN/TISSUE INTEGRITY - ADULT  Goal: Skin Integrity remains intact(Skin Breakdown Prevention)  Description: Assess:  -Perform Nicanor assessment every shift  -Clean and moisturize skin every shift  -Inspect skin when repositioning, toileting, and assisting with ADLS  -Assess under medical devices   -Assess extremities for adequate circulation and sensation     Bed Management:  -Have minimal linens on bed & keep smooth, unwrinkled  -Change linens as needed when moist or perspiring  -Avoid sitting or lying in one position for more than 2 hours while in bed    Toileting:  -Offer bedside commode  -Assess for incontinence every 2 hours  -Use incontinent care products after each incontinent episode     Activity:  -Mobilize patient 3 times a day  -Encourage or provide ROM exercises   -Turn and reposition patient every 2 Hours  -Use appropriate equipment to lift or move patient in bed  -Instruct/ Assist with weight shifting every 15 minutes when out of bed in chair  -Consider limitation of chair time 1 hour intervals    Skin Care:  -Avoid use of baby powder, tape, friction and shearing, hot water or constrictive clothing  -Relieve pressure over bony prominences using waffle cushion when in chair  -Do not massage red bony areas    Outcome: Progressing  Goal: Incision(s), wounds(s) or drain site(s) healing without S/S of infection  Description: INTERVENTIONS  - Assess and document dressing, incision, wound bed, drain sites and surrounding tissue  - Provide patient and family education  - Perform skin care/dressing changes everyday as ordered  Outcome: Progressing  Goal: Pressure injury heals and does not worsen  Description: Interventions:  - Implement low air loss mattress or specialty surface (Criteria met)  - Apply silicone foam dressing  - Instruct/assist with weight shifting every 15 minutes when in chair   - Limit chair time to 1 hour intervals  - Use special pressure reducing interventions such as waffle cushion when in chair   - Perform passive or active ROM   - Turn and reposition patient & offload bony prominences every 2 hours   - Utilize friction reducing device or surface for transfers   - Consider consults to  interdisciplinary teams such as wound care, PT/OT  - Use incontinent care products after each incontinent episode   - Consider nutrition services referral as needed  Outcome: Progressing     Problem: MUSCULOSKELETAL - ADULT  Goal: Maintain or return mobility to safest level of function  Description: INTERVENTIONS:  - Assess patient's ability to carry out ADLs; assess patient's baseline for ADL function and identify physical deficits which impact ability to perform ADLs (bathing, care of mouth/teeth, toileting, grooming, dressing, etc )  - Assess/evaluate cause of self-care deficits   - Assess range of motion  - Assess patient's mobility  - Assess patient's need for assistive devices and provide as appropriate  - Encourage maximum independence but intervene and supervise when necessary  - Involve family in performance of ADLs  - Assess for home care needs following discharge   - Consider OT consult to assist with ADL evaluation and planning for discharge  - Provide patient education as appropriate  Outcome: Progressing  Goal: Maintain proper alignment of affected body part  Description: INTERVENTIONS:  - Support, maintain and protect limb and body alignment  - Provide patient/ family with appropriate education  Outcome: Progressing     Problem: CARDIOVASCULAR - ADULT  Goal: Maintains optimal cardiac output and hemodynamic stability  Description: INTERVENTIONS:  - Monitor I/O, vital signs and rhythm  - Monitor for S/S and trends of decreased cardiac output  - Administer and titrate ordered vasoactive medications to optimize hemodynamic stability  - Assess quality of pulses, skin color and temperature  - Assess for signs of decreased coronary artery perfusion  - Instruct patient to report change in severity of symptoms  Outcome: Progressing     Problem: RESPIRATORY - ADULT  Goal: Achieves optimal ventilation and oxygenation  Description: INTERVENTIONS:  - Assess for changes in respiratory status  - Assess for changes in mentation and behavior  - Position to facilitate oxygenation and minimize respiratory effort  - Oxygen administered by appropriate delivery if ordered  - Initiate smoking cessation education as indicated  - Encourage broncho-pulmonary hygiene including cough, deep breathe, Incentive Spirometry  - Assess the need for suctioning and aspirate as needed  - Assess and instruct to report SOB or any respiratory difficulty  - Respiratory Therapy support as indicated  Outcome: Progressing     Problem: Nutrition/Hydration-ADULT  Goal: Nutrient/Hydration intake appropriate for improving, restoring or maintaining nutritional needs  Description: Monitor and assess patient's nutrition/hydration status for malnutrition   Collaborate with interdisciplinary team and initiate plan and interventions as ordered  Monitor patient's weight and dietary intake as ordered or per policy  Utilize nutrition screening tool and intervene as necessary  Determine patient's food preferences and provide high-protein, high-caloric foods as appropriate       INTERVENTIONS:  - Monitor oral intake, urinary output, labs, and treatment plans  - Assess nutrition and hydration status and recommend course of action  - Evaluate amount of meals eaten  - Assist patient with eating if necessary   - Allow adequate time for meals  - Recommend/ encourage appropriate diets, oral nutritional supplements, and vitamin/mineral supplements  - Order, calculate, and assess calorie counts as needed  - Recommend, monitor, and adjust tube feedings and TPN/PPN based on assessed needs  - Assess need for intravenous fluids  - Provide specific nutrition/hydration education as appropriate  - Include patient/family/caregiver in decisions related to nutrition  Outcome: Progressing

## 2022-11-14 NOTE — ASSESSMENT & PLAN NOTE
Lab Results   Component Value Date    CREATININE 1 11 11/14/2022    CREATININE 1 08 11/13/2022    CREATININE 1 03 11/12/2022       Lab Results   Component Value Date    EGFR 53 11/14/2022    EGFR 55 11/13/2022    EGFR 58 11/12/2022       • Patient admitted on 10/22 POA1 44 bl <1 1  Nephrology was following    Creatinine  Has trended down   • Prerenal secondary to sepsis secondary to UTI, use of Ace inhibitor  • Appears to be resolved and currently you around baseline levels    Plan:  • UA w/ microscopic, Urinary retention protocol, Bladder scan, Daily weights, I/O  • Continue sodium chloride infusion 75 mL/hour  • Monitor BMP daily and observe for downward trend of creatinine  • Avoid hypoperfusion of the kidneys, minimize nephrotoxins  • RAAS Blockers/Diuretics held:  Patient's home lisinopril is being held

## 2022-11-14 NOTE — ASSESSMENT & PLAN NOTE
Recent Labs     11/12/22  0424 11/13/22  0148 11/14/22  0619   K 3 4* 3 1* 3 2*     Repleted as needed

## 2022-11-14 NOTE — ASSESSMENT & PLAN NOTE
Lab Results   Component Value Date    HGBA1C 9 8 (H) 10/25/2022       Recent Labs     11/13/22  0804 11/13/22  1123 11/13/22  1638 11/14/22  0636   POCGLU 89 86 104 107       Blood Sugar Average: Last 72 hrs:  (P) 590 [id number]   Patient's home medication 60 units glargine at bedtime, Humalog 20 units t i d  Here patient was receiving 15 units glargine at night, sliding scale    Will increase glargine to 25 units  Currently still NPO per vascular

## 2022-11-14 NOTE — CASE MANAGEMENT
Case Management Discharge Planning Note    Patient name Lionel Life  Location ICU 03/ICU 95 MRN [id number]  : 1960 Date 2022       Current Admission Date: 10/21/2022  Current Admission Diagnosis:Stroke [facility])   Patient Active Problem List    Diagnosis Date Noted   • Diarrhea 2022   • CVA (cerebral vascular accident) ([address] ) 2022   • UTI (urinary tract infection) 2022   • Thrombocytopenia ([address] ) 2022   • Diabetic ulcer of heel associated with diabetes mellitus due to underlying condition ([address] ) 11/10/2022   • Acute on chronic anemia 10/30/2022   • Generalized edema due to fluid overload 10/27/2022   • PAD (peripheral artery disease) ([address] ) 10/25/2022   • Non healing left heel wound 10/22/2022   • Hypertensive urgency 10/22/2022   • Hypokalemia 10/22/2022   • Wound of lower extremity 10/21/2022   • Epigastric pain 2022   • Type 2 diabetes mellitus with hyperglycemia, with long-term current use of insulin ([facility] ) 10/18/2021   • Hyperparathyroidism ([facility] ) 10/18/2021   • 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 ([facility] ) 2021   • 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   • Moderate protein-calorie malnutrition ([facility] ) 2020   • Acute colitis 2020   • Arthritis 2019   • Depression, recurrent ([address] ) 2018   • Class 3 severe obesity due to excess calories with serious comorbidity and body mass index (BMI) of 40 0 to 44 9 in adult ([facility] ) 2018   • Acute respiratory failure with hypoxia ([facility] ) 2018   • Esophageal reflux 2018   • Uncontrolled type 2 diabetes with neuropathy 2018   • Diabetic peripheral neuropathy ([facility] )    • Systolic murmur    • Stroke ([facility] ) 2015   • Anxiety 2015   • Vitamin D deficiency 2015   • Benign essential hypertension 2012   • Familial combined hyperlipidemia 2012      LOS (days): 23  Geometric Mean LOS (GMLOS) (days):   Days to GMLOS:     OBJECTIVE:  Risk of Unplanned Readmission Score: 31 28         Current admission status: Inpatient   Preferred Pharmacy:   [id number], [address] [facility] [address]  [address] [place] [address] [address] [place] [address]  Phone: [phone] Fax: [phone]    Primary Care Provider: ALEX Patel    Primary Insurance: BLUE CROSS  Secondary Insurance:     DISCHARGE DETAILS:                           Contacts  Patient Contacts: Sepideh floyd)  Relationship to Patient[de-identified] Family  Contact Method: Phone  Phone Number: [phone]  Reason/Outcome: Emergency Contact, Discharge Planning, Referral                                                                     Additional Comments: CM informed by ICU that patient's daughter requested a call  CM called daughter to address her questions and discuss additional referrals to acute rehab d/t patient's recent stroke 11/13/22  No answer, left  requesting a return call

## 2022-11-14 NOTE — ASSESSMENT & PLAN NOTE
Frequent large volume loose stools started today, no fever or abdominal pain, no hematochezia  Will dc bowel regiment  Send for cdiff PCR and toxin

## 2022-11-14 NOTE — ASSESSMENT & PLAN NOTE
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, also cannot exclude septic emboli as the etiology  Intracranial imaging to be completed to rule out significant stenosis or occlusion  · Neuro following  · Transfer patient back to the intensive care unit; neuro recommendations appreciated  ·   · MRI brain with contrast when able and safe to perform  · Reviewed with medicine team, cleared by nephrology for CTA of head and neck - this will be completed today  · Echo completed, as above - recommend repeat echo to rule out development of thrombus or vegetation  · Lipid Panel  · Hemoglobin A1c - 9 8H  · Plavix and Agatroban, currently - the plan is to continue this medication - she is at risk for bleed, but there is risk for more embolic events off of this medication, risk /benefit - recommend frequent neurological checks and stat ct of the head with any changes in examination  · Atorvastatin 40 mg  · Avoid hypotension, blood pressure goal per neuro-perspective 081-255 systolic     · Continue telemetry  · PT/OT/ST  · Repeat CT

## 2022-11-14 NOTE — ASSESSMENT & PLAN NOTE
Vitals:    11/14/22 0317 11/14/22 0417 11/14/22 0517 11/14/22 0617   BP: 157/62 130/67 160/93 (!) 173/82   Pulse: 88 86 88 88   Resp: (!) 31 (!) 28 (!) 28 (!) 31   Temp:  98 2 °F (36 8 °C)     TempSrc:  Oral     SpO2: 96% 96% 91% 96%   Weight:       Height:       Patient's blood pressure has been running high in the 180s, 190s SBP  Patient's home dose of amlodipine 10 mg, lisinopril 40 mg, metoprolol succinate 25 mg daily  Lisinopril was held due to NYDIA  Plan:  Continue amlodipine 10 mg once daily, continue Coreg 25 mg b i d  Doxazosin 2 mg daily  Continue hydralazine 10 mg every 6 hours p r n   For SBP greater than 180  Hold off home lisinopril per nephrology due to NYDIA

## 2022-11-14 NOTE — ASSESSMENT & PLAN NOTE
Recent Labs     11/12/22  0424 11/13/22  0148 11/14/22  1049   HGB 7 8* 7 2* 7 0*         Hemoglobin fluctuating with baseline around 10  S/p 1 unit transfused 11/3/2022, H/H improved and has remained stable ever since  FOBT neg, no current source of bleeding, groin without hematoma  Continue to monitor H/H

## 2022-11-14 NOTE — PROGRESS NOTES
Progress Note - Neurology   Junior Edouard 65 y o  female MRN: [id number]  Unit/Bed#: ICU 03 Encounter: [id number]      Assessment/Plan   * Stroke [facility])  Assessment & Plan  59-year-old female with history of insulin dependent DM , HTN, HLD, obesity, depression, initially presenting on 10/22 for non-healing LE wounds and subsequent prolonged hospitalization:    Has had very complicated hospitalization:    -undergone multiple vascular procedures/interventions in both legs, complicated by atheroembolic events bilaterally  She  -during admission on heparin drip and developed thrombocytopenia, there was also (concern for HIT; now on argatroban)   -also met sepsis criteria (treated with IV antibiotics),    -she was also noted to have hypertensive urgency    -NYDIA which has now resolved  Over weekend with mental status change of unclear time origin; CT head was obtained on 11/13 and noted moderate size acute/recent infarct in the right parietal lobe with local mass effect  Etiology of infarct unclear at this time, need to rule out cardioembolic etiology, 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, also cannot exclude septic emboli as the etiology (see below, blood cultures from 11/13 AM positive for Gram positive cocci in clusters, although may be contaminant)      Plan:  -stroke workup ongoing:  -repeat CT head today (11/14) when able  -CT head on 11/13 noting moderate R hemisphere infarct; no obvious hemorrhagic conversion  -CTA head/neck yesterday with moderate vertebral origin stenosis bilaterally, some moderate short-segment right extracranial ICA stenosis, otherwise no critical stenosis nor large vessel occlusion  -no need for MRI brain from neurology standpoint as will not , discussed with critical care  -2D echocardiogram with EF 60%, technically poor study but with normal atria size bilaterally; checking repeat 2D echo today with abnormal BC's  -continuing on argatroban given the ongoing vascular issues, continuing Plavix 75 mg daily  -Blood cultures yesterday AM positive: Gram positive cocci in clusters (unclear if contaminant)   -may need a LEON to evaluate for endocarditis, involve ID if indicated, defer to critical care   -trend for fever/leukocytosis     -fever of 102 6 on 11/12, since afebrile   -on IV Rocephin    -CBC monitoring: 15K leukocytosis     -hemoglobin A1c in late October of 9 8, lipid panel with LDL of 55  -neuro checks  -telemetry monitoring  -stroke education provided  -therapy evaluations when clinically/medically appropriate  -continued vascular management:   -Peripheral artery disease status post right lower extremity arteriogram and with atherectomy, stenting and SFA/pop angioplasty an aspiration of thrombosis  -nephrology following for NYDIA; reported to be resolved    Discussed plan of care with attending neurologist      Recommendations for outpatient neurological follow up have yet to be determined  Subjective:   Patient resting in bed, doing ok  Notes no subjective issues with vision/L side (L sided neglect evident)  Per nursing no significant neurologic exam changes in the interim  Vitals: Blood pressure (!) 173/82, pulse 88, temperature 98 2 °F (36 8 °C), temperature source Oral, resp  rate (!) 31, height 5' 3 75" (1 619 m), weight 116 kg (255 lb 11 7 oz), SpO2 96 %, not currently breastfeeding  ,Body mass index is 44 24 kg/m²  Physical Exam:   Physical Exam  Constitutional:       Appearance: She is obese  She is ill-appearing  Cardiovascular:      Rate and Rhythm: Normal rate  Pulmonary:      Effort: Pulmonary effort is normal    Abdominal:      General: There is no distension  Musculoskeletal:      Cervical back: Normal range of motion  Skin:     Comments: Poorly healing wounds in the distal LE bilaterally; necrotic appearance to feet/digits  Neurologic Exam     Mental Status   Awake, alert, oriented, no dysarthria/aphasia  Following commands        Cranial Nerves   R gaze preference, difficulty crossing midline to the L  L visual neglect/field cut with confrontation/finger counting, L facial asymmetry  Motor Exam MOderate degree of L UE weakness compared to R; L sided neglect inhibits maximal effort as well  Poor maintenance of antigravity bilaterally with the LE due to wounds/pain; R slightly brisker than L  Sensory Exam   Extinction on the L with double simultaneous tactile stimuli in the L UE and LE  Gait, Coordination, and Reflexes     Tremor   Resting tremor: absent  Intention tremor: absent  No clinical seizure activity noted throughout exam         Lab, Imaging and other studies:   CTA head and neck w wo contrast   Final Result by Bo Skiff, MD (11/13 3806)      Moderate bilateral vertebral artery origin plaque stenosis  Bilateral common carotid retropharyngeal course with short segment moderate plaque stenosis  No occlusion or dissection  Workstation performed: [id number]         CT head wo contrast   Final Result by Marija Hayden MD (11/13 5032)         1  Moderate-sized acute/recent infarction centered on the right parietal lobe with local mass effect  No acute intracranial hemorrhage  Workstation performed: [id number]         XR chest portable   Final Result by Jigar Lancaster MD (11/13 1246)      Mild pulmonary edema and trace layering pleural effusions  No definite consolidation  If continued clinical concern, suggest follow-up frontal and lateral views for further evaluation  Workstation performed: [id number]         XR chest portable   Final Result by Mary Bueno MD (11/10 1728)      Mild pulmonary venous congestion  Question trace right effusion  Workstation performed: [id number]         CTA ABDOMEN W RUN OFF W WO CONTRAST   Final Result by Romy Oro MD (11/09 1705)      1    Suspect interval occlusion of recently placed proximal right common iliac artery stent   2  No residual filling defect or dissection involving right common femoral artery  Diffusely small diseased right SFA and popliteal artery without focal flow-limiting stenosis identified  Patent recently placed SFA stent  3   Intact anterior tibial artery with stenotic and/or occluded dorsalis pedis artery  Posterior tibial artery occludes at the ankle  Patent peroneal artery without opacification of anterior and posterior communicating arteries  4   Near circumferentially calcified stenosis of proximal left common iliac artery  The lesion measures 30-40% in severity by reconstructions which are compromised by artifact and software limitations and therefore difficult to exclude a focal    flow-limiting stenosis within this segment  5   No significant left common femoral stenosis  SFA is diffusely diseased  Newly placed proximal stent is patent but distal SFA stent is occluded  Popliteal artery is diffusely stenotic  6   Left popliteal artery is diffusely stenotic which seems new from the prior arteriogram and may be related to acute stent occlusion  Left anterior tibial artery is intact  Posterior tibial artery occludes at the calcaneus  7   Small bilateral pleural effusions   8  Small volume ascites new from prior CT with diffuse subcutaneous edema            Workstation performed: [id number]         VAS lower limb arterial duplex, complete bilateral   Final Result by Remi Piper MD (11/09 1242)      XR chest portable ICU   Final Result by Josue Prieto DO (11/09 1612)      Moderate pulmonary edema, similar to the prior examination  No large effusions                    Workstation performed: [id number]         VAS lower limb vein mapping bypass graft   Final Result by Remi Piper MD (11/03 0945)      CTA ABDOMEN W RUN OFF W WO CONTRAST   Final Result by Cindy Dias MD (11/02 0154)   Vascular:   Left lower extremity:   Interval stenting and lithotripsy of the left superficial femoral artery  Right lower extremity:   1  Interval development of a probable flow-limiting dissection flap at the prior arteriotomy site in the right common femoral artery superimposed upon moderate to severe atherosclerotic disease  2   Focal severe atherosclerotic narrowing at the adductor canal with additional moderate atherosclerotic narrowing throughout the remainder of the right superficial femoral artery  The study was marked in EPIC for significant notification  Workstation performed: [id number]         VAS lower limb arterial duplex, limited, unilateral   Final Result by Merly Gibson MD (11/02 1658)      VAS SHARIF & waveform analysis, multiple levels   Final Result by Merly Gibson MD (11/01 1541)      XR chest portable   Final Result by Roxy Tovar MD (10/31 1718)      Pulmonary edema has increased since October 26, 2022  Workstation performed: [id number]         IR aortagram with run-off   Final Result by Jessica Gonzalez MD (11/01 8962)      Left lower leg arteriogram with proximal and distal SFA shockwave balloon lithotripsy and stent placement, as well as right common iliac artery stent placement  PLAN:      Follow-up arterial duplex  Patient became short of breath after the procedure, with increased O2 requirement  Etiology most likely volume overload secondary to renal prep as well as contrast and fluid was given during the procedure where she was lying flat  Dr Cory Landau from the    primary team made aware  Chest x-ray to be obtained upon arrival to the floor  Workstation performed: [id number]         MRI follow up [facility]   Final Result by Mylene Ruiz MD (10/28 2982)      Nondiagnostic study  Workstation performed: [id number]         CTA ABDOMEN W RUN OFF W WO CONTRAST   Final Result by Jessica Gonzalez MD (10/28 7126)      1    Moderate stenosis of the mid infrarenal abdominal aorta secondary to calcific atherosclerotic disease  2  LEFT: Up to 50% stenosis of the proximal common iliac artery  The proximal and distal 3rd of the superficial femoral artery demonstrates 50-75% stenosis  Popliteal artery demonstrates greater than 75% stenosis in the P2 segment  Patent    three-vessel runoff extending into the left foot  3  RIGHT: Proximal common iliac artery stenosis of up to 50%  50% stenosis of the common femoral artery  Superficial femoral artery demonstrates up to 50% stenosis within the proximal several centimeters, 50-75% stenosis throughout the distal 3rd of    the vessel  Popliteal artery demonstrates scattered stenoses of 50-75%  Patent three-vessel runoff extending into the right foot  Workstation performed: [id number]         MRI ankle/heel left  wo contrast   Final Result by Sergio Howard MD (10/27 1949)      Exam moderately limited due to patient motion  There is a large plantar lateral heel ulcer, without underlying soft tissue abscess (series 5 image 5-15 )      Associated with this ulcer, there is only very minimal reactive marrow edema in the calcaneal tubercle evident on T2-weighted sequences (series 3 image 2, series 7 image 8 ) No confluent T1-weighted marrow abnormality or cortical destruction to suggest    osteomyelitis  Workstation performed: [id number]         XR chest portable   Final Result by David Sin MD (10/27 0488)      Mild pulmonary vascular congestion  Workstation performed: [id number]         VAS lower limb arterial duplex, complete bilateral   Final Result by Jonathan Suggs MD (10/24 1400)      CT lower extremity w contrast left   Final Result by Giuseppe Day DO (10/22 0102)      No acute bony process is seen        Moderate superficial soft tissue edema in the foot, ankle, and leg, superimposed cellulitis considered in the appropriate clinical setting  Correlation with patient's symptoms and laboratory values recommended  No discrete drainable collection    identified  Other findings as above  Workstation performed: [id number]         XR chest 1 view portable   ED Interpretation by Manuelito Shipman PA-C (10/21 2203)   ED wet read:  Possible mild vascular congestion  Final Result by Fantasma Keller MD (03/20 1712)      No acute cardiopulmonary disease  Workstation performed: [id number]         XR foot 3+ views RIGHT   Final Result by Fantasma Keller MD (87/70 4541)      No acute osseous abnormality        Workstation performed: [id number]         IR lower extremity angiogram    (Results Pending)   IR lower extremity angiogram    (Results Pending)   CT head wo contrast    (Results Pending)         CBC:   Results from last 7 days   Lab Units 11/13/22 0148 11/12/22 0424 11/11/22 1918 11/11/22  0435   WBC Thousand/uL 15 00* 15 36*  --  15 69*   RBC Million/uL 2 61* 2 80*  --  2 93*   HEMOGLOBIN g/dL 7 2* 7 8*  --  8 2*   HEMATOCRIT % 22 8* 24 9*  --  26 4*   MCV fL 87 89  --  90   PLATELETS Thousands/uL 67* 41* 36* 40*   , BMP/CMP:   Results from last 7 days   Lab Units 11/14/22 0619 11/13/22 0148 11/12/22 0424 11/11/22  0435   SODIUM mmol/L 140 140 140 139   POTASSIUM mmol/L 3 2* 3 1* 3 4* 3 3*   CHLORIDE mmol/L 102 102 103 103   CO2 mmol/L 28 31 29 28   BUN mg/dL 12 16 17 18   CREATININE mg/dL 1 11 1 08 1 03 1 00   CALCIUM mg/dL 8 0* 7 8* 7 9* 7 8*   AST U/L  --  114* 95* 98*   ALT U/L  --  53 46 44   ALK PHOS U/L  --  232* 102 112   EGFR ml/min/1 73sq m 53 55 58 60   , Vitamin B12:   , HgBA1C:   , TSH:   , Coagulation:   Results from last 7 days   Lab Units 11/11/22 1918   INR  3 70*   , Lipid Profile:   Results from last 7 days   Lab Units 11/14/22  0619   HDL mg/dL 28*   LDL CALC mg/dL 55   TRIGLYCERIDES mg/dL 93   , Ammonia:   , Urinalysis:   Results from last 7 days   Lab Units 11/12/22  1625   COLOR UA  Yellow   CLARITY UA  Slightly Cloudy   SPEC GRAV UA  1 025   PH UA  5 5   LEUKOCYTES UA  Trace*   NITRITE UA  Positive*   GLUCOSE UA mg/dl Negative   KETONES UA mg/dl Negative   BILIRUBIN UA  Negative   BLOOD UA  Large*   , Drug Screen:   , Medication Drug Levels:       Invalid input(s): CARBAMAZEPINE,  PHENOBARB, LACOSAMIDE, OXCARBAZEPINE  VTE Prophylaxis: Sequential compression device (Venodyne)  and argatroban    Total time spent today 25 minutes  Discussed plan of care with critical care: repeat CT head today to evaluate for worsening edema/rule out hemorrhagic conversion, continue AC/AP, neuro checks, ongoing vascular management, continue stroke pathway

## 2022-11-14 NOTE — ASSESSMENT & PLAN NOTE
change in mental status, CT scan of the head was performed which showed a moderate sized acute recent infarct of the right parietal lobe with local mass effect with no evidence of acute hemorrhage  Stroke alert was called and the patient will be transferred back to the intensive care unit for monitoring as she is high risk for conversion to hemorrhagic stroke

## 2022-11-14 NOTE — ASSESSMENT & PLAN NOTE
Lab Results   Component Value Date    WBC 15 00 (H) 11/13/2022    WBC 15 36 (H) 11/12/2022    WBC 15 69 (H) 11/11/2022     Echo on 11/07 shows 67% LVEF, no diastolic dysfunction  After the procedure patient was noted to have desaturated to 85% SpO2, and needed 5 L via face mask to saturate greater than 90%  Weaned down to 3 L saturating 95%     Continue to wean as able

## 2022-11-14 NOTE — PROGRESS NOTES
[address] [facility]  Progress Note - [address] 1960, 65 y o  female MRN: [id number]  Unit/Bed#: ICU 03 Encounter: [id number]  Primary Care Provider: ALEX Diaz   Date and time admitted to hospital: 10/21/2022  7:58 PM    PAD (peripheral artery disease) [facility])  Assessment & Plan  Patient has uncontrolled diabetes, hypertension, hyperlipidemia, patient was lost to follow-up and has not seen endocrinologist for over a year  Admits that she is poorly compliant with medication  Admitted due to Worsening lower extremity wounds  · CTA abd/pelvis:   § 2   LEFT: Up to 50% stenosis of the proximal common iliac artery   The proximal and distal 3rd of the superficial femoral artery demonstrates 50-75% stenosis   Popliteal artery demonstrates greater than 75% stenosis in the P2 segment   Patent   three-vessel runoff extending into the left foot  § 3  RIGHT: Proximal common iliac artery stenosis of up to 50%   50% stenosis of the common femoral artery   Superficial femoral artery demonstrates up to 50% stenosis within the proximal several centimeters, 50-75% stenosis throughout the distal 3rd of   the vessel   Popliteal artery demonstrates scattered stenoses of 50-75%   Patent three-vessel runoff extending into the right foot    S/p abdominal aortogram,  LLE SFA shockwave angioplasty/FATOU 10/31/22   Post procedure  There was evidence of improvement in SHARIF in the left however on the right, SHARIF was noted to decrease to 0 51 (0 80)  CTA abd/pelvis w/ run off 11/1/22: Flow-limiting dissection flap at the prior arteriotomy site in the R CFA superimposed upon moderate to severe atherosclerotic disease  Focal severe atherosclerotic narrowing at the adductor canal with additional moderate atherosclerotic narrowing throughout the remainder of the R SFA    S/p R femoral endarterectomy 11/7  Severe pain post procedure  CTA 11/9 shows Suspect interval occlusion of recently placed proximal right common iliac artery stent  11/10 s/p aspiration thrombectomy in right common iliac artery and Left SFA  S/p Thrombosed distal left SFA stent treated with aspiration thrombectomy  For monitoring in the ICU  Will monitor post procedure in the ICU  Heparin gtt transitioned to Argatroban infusion in setting of thrombocytopenia on 11/11   Continue atorvastatin 40 mg once daily, clopidogrel 45 mg once daily  Vascular is the primary    * Stroke [facility])  Assessment & Plan  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, also cannot exclude septic emboli as the etiology  Intracranial imaging to be completed to rule out significant stenosis or occlusion  · Neuro following  · Transfer patient back to the intensive care unit; neuro recommendations appreciated  ·   · MRI brain with contrast when able and safe to perform  · Reviewed with medicine team, cleared by nephrology for CTA of head and neck - this will be completed today  · Echo completed, as above - recommend repeat echo to rule out development of thrombus or vegetation  · Lipid Panel  · Hemoglobin A1c - 9 8H  · Plavix and Agatroban, currently - the plan is to continue this medication - she is at risk for bleed, but there is risk for more embolic events off of this medication, risk /benefit - recommend frequent neurological checks and stat ct of the head with any changes in examination  · Atorvastatin 40 mg  · Avoid hypotension, blood pressure goal per neuro-perspective [phone] systolic     · Continue telemetry  · PT/OT/ST  · Repeat CT      Diarrhea  Assessment & Plan  Frequent large volume loose stools started today, no fever or abdominal pain, no hematochezia  Will dc bowel regiment  Send for cdiff PCR and toxin      Thrombocytopenia (HCC)  Assessment & Plan  Significant reduction in platelet > 50 % in the past 4 days  Patient started on Heparin infusion on 11/1 and SQ heparin prior  4Ts score 5- intermediate probability for HIIT  Ruled out DIC/DITP/   Thrombocytopenia could be due to heparin   less likely infectious origin, less likely bone marrow suppression with normal cell lines, normal liver function  Plan  DIC Screen- FDP, D Dimers, hemolysis screen, fibrinogen  HIT platelet antibody  Antithrombin III  Heparin gtt discontinued and Argatroban initiated    Diabetic ulcer of heel associated with diabetes mellitus due to underlying condition [facility])  Assessment & Plan    Podiatry on board with recommendations  below appreciated  · No immediate plan for podiatric surgical intervention  Dressings changed, no clinical sign of acute infection B/L  · Continue local wound care, betadine DSD  Appreciate nursing assistance with dressing changes  · Elevation on green foam wedges or pillows when non-ambulatory  · Rest of care per primary team         Acute on chronic anemia  Assessment & Plan  Lab Results   Component Value Date    HGB 7 2 (L) 11/13/2022    HGB 7 8 (L) 11/12/2022    HGB 8 2 (L) 11/11/2022     Hemoglobin fluctuating with baseline around 10- no overt bleeding  Aetiology ; chronic disease, and also due to sepsis    S/p 1 unit transfused 11/3/2022, H/H improved and has remained stable ever since  FOBT neg, no current source of bleeding, groin without hematoma  Continue to monitor H/H  Transfuse for Hgb less than 7     Hypokalemia  Assessment & Plan  Recent Labs     11/12/22  0424 11/13/22  0148 11/14/22  0619   K 3 4* 3 1* 3 2*     Repleted as needed    Wound of lower extremity  Assessment & Plan  · Wound care following  · Vascular following  · Podiatry following    Type 2 diabetes mellitus with hyperglycemia, with long-term current use of insulin [facility])  Assessment & Plan  Lab Results   Component Value Date    HGBA1C 9 8 (H) 10/25/2022       Recent Labs     11/13/22  0804 11/13/22  1123 11/13/22  1638 11/14/22  0636   POCGLU 89 86 104 107       Blood Sugar Average: Last 72 hrs:  (P) 466 [id number]   Patient's home medication 60 units glargine at bedtime, Humalog 20 units t i d     Glargine to 15 units PLUS ss  Pt on CLD    Benign essential hypertension  Assessment & Plan  Vitals:    11/14/22 0517 11/14/22 0617 11/14/22 0730 11/14/22 0831   BP: 160/93 (!) 173/82 161/74 152/63   BP Location:   Right arm    Pulse: 88 88 84 84   Resp: (!) 28 (!) 31 (!) 28 (!) 43   Temp:    99 3 °F (37 4 °C)   TempSrc:    Oral   SpO2: 91% 96% 98% 97%   Weight:       Height:       Patient's blood pressure has been running high in the 180s, 190s SBP  Patient's home dose of amlodipine 10 mg, lisinopril 40 mg, metoprolol succinate 25 mg daily  Lisinopril was held due to NYDIA  Permissive hypertension in the context of ischemic stroke- goal -160  Plan:  Continue amlodipine 10 mg once daily, hold Coreg 25 mg b i d  Continue hydralazine 10 mg every 6 hours p r n  For SBP greater than 160  Hold off home lisinopril per nephrology due to NYDIA      Depression, recurrent [facility])  Assessment & Plan  ·  Seen by Psychiatry during this admission  Unspecified mood disorder with psychosis; rule out mood disorder with psychosis secondary to other physiological condition; rule out major depression with psychotic features  Plan    Recommend increasing Zoloft to 150 mg p o  daily for depression and anxiety  Seroquel 25 mg p o  q h s  as antidepressant adjunct as well as for complaints of insomnia, anxiety and hallucinations        Acute respiratory failure with hypoxia (HCC)  Assessment & Plan  Lab Results   Component Value Date    WBC 16 57 (H) 11/14/2022    WBC 15 00 (H) 11/13/2022    WBC 15 36 (H) 11/12/2022     Echo on 11/07 shows 30% LVEF, no diastolic dysfunction  After the procedure patient was noted to have desaturated to 85% SpO2, and needed 5 L via face mask to saturate greater than 90%  Weaned down to 3 L saturating 95%   -   Continue to wean as able  Incentive spirometry and airway protocol      NYDIA (acute kidney injury) (HCC)-resolved as of 2022  Assessment & Plan  Lab Results   Component Value Date    CREATININE 1 11 2022    CREATININE 1 08 2022    CREATININE 1 03 2022       Lab Results   Component Value Date    EGFR 53 2022    EGFR 55 2022    EGFR 58 2022       • Patient admitted on 10/22 POA1 44 bl <1 1  Nephrology was following  Creatinine  Has trended down   • Prerenal secondary to sepsis secondary to UTI, use of Ace inhibitor  • Appears to be resolved and currently you around baseline levels    Plan:  • UA w/ microscopic, Urinary retention protocol, Bladder scan, Daily weights, I/O  • Continue sodium chloride infusion 75 mL/hour  • Monitor BMP daily and observe for downward trend of creatinine  • Avoid hypoperfusion of the kidneys, minimize nephrotoxins  • RAAS Blockers/Diuretics held:  Patient's home lisinopril is being held        ----------------------------------------------------------------------------------------  HPI/24hr events: none    Patient appropriate for transfer out of the ICU today?: No  Disposition: Continue Critical Care   Code Status: Level 1 - Full Code  ---------------------------------------------------------------------------------------  SUBJECTIVE  Patient see this morning, lethargic lying in bed   Not ready to answer a lot of questions but did not report complaints    Review of Systems  Review of systems was unable to be performed secondary to patient lethargic  ---------------------------------------------------------------------------------------  OBJECTIVE    Vitals   Vitals:    22 1036 22 1117 22 1157 22 1217   BP: 133/68 151/64  145/67   BP Location:    Right arm   Pulse: 82 80  78   Resp:  22  (!) 8   Temp:   98 7 °F (37 1 °C)    TempSrc:   Oral    SpO2:  97%  97%   Weight:       Height:         Temp (24hrs), Av 4 °F (36 9 °C), Min:97 8 °F (36 6 °C), Max:99 3 °F (37 4 °C)  Current: Temperature: 98 7 °F (37 1 °C)  Arterial Line BP: 112/72  Arterial Line MAP (mmHg): 92 mmHg    Respiratory:  SpO2: SpO2: 97 %, SpO2 Activity: SpO2 Activity: At Rest  Nasal Cannula O2 Flow Rate (L/min): 2 L/min    Invasive/non-invasive ventilation settings   Respiratory  Report   Lab Data (Last 4 hours)    None         O2/Vent Data (Last 4 hours)    None                Physical Exam  Vitals reviewed  Constitutional:       General: She is awake  Appearance: She is obese  She is ill-appearing  She is not toxic-appearing or diaphoretic  HENT:      Head: Normocephalic and atraumatic  Eyes:      Comments: Pupils Equal and react to light   Neck:      Vascular: No hepatojugular reflux  Cardiovascular:      Rate and Rhythm: Tachycardia present  Heart sounds: No murmur heard  Pulmonary:      Effort: No respiratory distress  Breath sounds: No wheezing or rales  Abdominal:      General: There is no distension  Palpations: Abdomen is soft  Tenderness: There is no abdominal tenderness  There is no guarding  Musculoskeletal:      Right lower leg: Edema present  Left lower leg: Edema present  Feet:      Comments: Echymosis, pale  and necrosis of heel  Gangrenous right 5 digit  Skin:     General: Skin is warm  Findings: Bruising, erythema and lesion present  Neurological:      Mental Status: She is lethargic  Motor: Weakness (left side greater than righ) present  No tremor or seizure activity               Laboratory and Diagnostics:  Results from last 7 days   Lab Units 11/14/22  1049 11/13/22  0148 11/12/22  0424 11/11/22  1918 11/11/22  0435 11/10/22  0410 11/09/22  0439 11/08/22  0532 11/07/22  1520   WBC Thousand/uL 16 57* 15 00* 15 36*  --  15 69* 16 06* 14 83* 16 57* 13 88*   HEMOGLOBIN g/dL 7 0* 7 2* 7 8*  --  8 2* 8 7* 8 6* 8 8* 8 7*   HEMATOCRIT % 21 9* 22 8* 24 9*  --  26 4* 28 1* 28 2* 28 7* 27 2*   PLATELETS Thousands/uL 158 67* 41* 36* 40* 60* 83* 105* 157   NEUTROS PCT %  --  84*  --   -- --   --   --   --   --    BANDS PCT %  --   --   --   --   --   --   --   --  2   MONOS PCT %  --  9  --   --   --   --   --   --   --    MONO PCT % 3*  --   --   --   --   --   --   --  3*     Results from last 7 days   Lab Units 11/14/22  0619 11/13/22  0148 11/12/22  0424 11/11/22  0435 11/10/22  0410 11/09/22  0439 11/08/22  0532 11/07/22  1520   SODIUM mmol/L 140 140 140 139 140 141 139 140   POTASSIUM mmol/L 3 2* 3 1* 3 4* 3 3* 3 5 3 5 3 6 4 3   CHLORIDE mmol/L 102 102 103 103 103 105 104 104   CO2 mmol/L 28 31 29 28 28 26 24 24   ANION GAP mmol/L 10 7 8 8 9 10 11 12   BUN mg/dL 12 16 17 18 15 17 19 19   CREATININE mg/dL 1 11 1 08 1 03 1 00 0 97 1 02 1 20 1 21   CALCIUM mg/dL 8 0* 7 8* 7 9* 7 8* 8 0* 7 8* 7 4* 7 5*   GLUCOSE RANDOM mg/dL 104 85 107 141* 130 158* 216* 207*   ALT U/L  --  53 46 44  --   --   --  23   AST U/L  --  114* 95* 98*  --   --   --  42   ALK PHOS U/L  --  232* 102 112  --   --   --  209*   ALBUMIN g/dL  --  1 5* 1 5* 1 6*  --   --   --  1 8*   TOTAL BILIRUBIN mg/dL  --  0 67 0 48 0 49  --   --   --  1 53*     Results from last 7 days   Lab Units 11/07/22  1520   MAGNESIUM mg/dL 1 7   PHOSPHORUS mg/dL 5 6*      Results from last 7 days   Lab Units 11/14/22  1259 11/14/22  0619 11/14/22  0141 11/13/22  1939 11/13/22  1259 11/13/22  0148 11/12/22  1635 11/11/22  2115 11/11/22  1918   INR   --   --   --   --   --   --   --   --  3 70*   PTT seconds 107* 94* 97* 98* 95* 97* 112*   < > 86*    < > = values in this interval not displayed            Results from last 7 days   Lab Units 11/13/22  0148   LACTIC ACID mmol/L 1 0     ABG:  Results from last 7 days   Lab Units 11/10/22  2031   PH ART  7 458*   PCO2 ART mm Hg 35 3*   PO2 ART mm Hg 73 3*   HCO3 ART mmol/L 24 4   BASE EXC ART mmol/L 0 7   ABG SOURCE  Radial, Left     VBG:  Results from last 7 days   Lab Units 11/10/22  2031   ABG SOURCE  Radial, Left     Results from last 7 days   Lab Units 11/14/22  0619 11/13/22  0148   PROCALCITONIN ng/ml 1 00* 0 48*       Micro  Results from last 7 days   Lab Units 11/13/22  0109 11/12/22  1625   BLOOD CULTURE  Staphylococcus coagulase negative*  No Growth at 24 hrs   --    GRAM STAIN RESULT  Gram positive cocci in clusters*  --    URINE CULTURE   --  >100,000 cfu/ml Gram Negative Fitz*       EKG: none  Imaging: I have personally reviewed pertinent reports  and I have personally reviewed pertinent films in PACS    Intake and Output  I/O       11/12 0701 11/13 0700 11/13 0701 11/14 0700    P  O  375 240    I V  (mL/kg) 4 7 (0) 2 8 (0)    Total Intake(mL/kg) 379 7 (3 3) 242 8 (2 1)    Urine (mL/kg/hr) 3450 (1 2) 1430 (0 5)    Total Output 3450 1430    Net -3070 3 -1187 2                Height and Weights   Height: 5' 4" (162 6 cm)     Body mass index is 43 77 kg/m²  Weight (last 2 days)     Date/Time Weight    11/14/22 0930 116 (255)    11/14/22 0831 116 (255)    11/13/22 0559 116 (255 73)            Nutrition       Diet Orders   (From admission, onward)             Start     Ordered    11/14/22 1038  Diet Dysphagia/Modified Consistency; Dysphagia 1-Pureed; Thin Liquid  Diet effective now        References:    Nutrtion Support Algorithm Enteral vs  Parenteral   Question Answer Comment   Diet Type Dysphagia/Modified Consistency    Dysphagia/Modified Consistency Dysphagia 1-Pureed    Liquid Modifier Thin Liquid    RD to adjust diet per protocol?  Yes        11/14/22 1107                  Active Medications  Scheduled Meds:  Current Facility-Administered Medications   Medication Dose Route Frequency Provider Last Rate   • acetaminophen  650 mg Oral K2T PRN Starlet Jose L, CRNP     • amLODIPine  10 mg Oral Daily Canda Line, CRNP     • argatroban  0 1-3 mcg/kg/min Intravenous Titrated Canda Line, CRNP 0 1 mcg/kg/min (11/14/22 1029)   • atorvastatin  40 mg Oral Daily With CitigroupBENNP     • cefTRIAXone  2,000 mg Intravenous [id number] Starlet Jose L, CRNP 2,000 mg (11/14/22 0134)   • clopidogrel  75 mg Oral Daily Medford Grade, CRNP     • doxazosin  2 mg Oral Daily Medford Grade, CRNP     • gabapentin  200 mg Oral BID Medford Grade, CRNP     • insulin glargine  15 Units Subcutaneous HS Medford Grade, CRNP     • insulin lispro  1-6 Units Subcutaneous TID AC Elenora Chew, CRNP     • naloxone  0 04 mg Intravenous Q1MIN PRN Medford Grade, CRNP     • nystatin   Topical BID Medford Grade, CRNP     • ondansetron  4 mg Intravenous Q6H PRN Medford Grade, CRNP     • oxyCODONE  10 mg Oral Q4H PRN Medford Grade, CRNP     • pantoprazole  40 mg Oral Early Morning Medford Grade, CRNP     • polyethylene glycol  17 g Oral Daily PRN Elenora Chew, CRNP     • QUEtiapine  25 mg Oral HS Medford Grade, CRNP     • senna  1 tablet Oral HS PRN Elenora Chew, CRNP     • sertraline  150 mg Oral Daily Medford Grade, CRNP       Continuous Infusions:  argatroban, 0 1-3 mcg/kg/min, Last Rate: 0 1 mcg/kg/min (11/14/22 1029)      PRN Meds:   acetaminophen, 650 mg, Q6H PRN  naloxone, 0 04 mg, Q1MIN PRN  ondansetron, 4 mg, Q6H PRN  oxyCODONE, 10 mg, Q4H PRN  polyethylene glycol, 17 g, Daily PRN  senna, 1 tablet, HS PRN        Invasive Devices Review  Invasive Devices  Report    Peripheral Intravenous Line  Duration           Peripheral IV 11/09/22 Left;Proximal;Upper;Ventral (anterior) Arm 5 days    Peripheral IV 11/13/22 Right;Ventral (anterior) Forearm <1 day          Drain  Duration           Urethral Catheter Latex 16 Fr  7 days                Rationale for remaining devices: monitoring  ---------------------------------------------------------------------------------------  Advance Directive and Living Will:      Power of :    POLST:    ---------------------------------------------------------------------------------------  Care Time Delivered: 30 min      Arianna Obrien MD      Portions of the record may have been created with voice recognition software    Occasional wrong word or "sound a like" substitutions may have occurred due to the inherent limitations of voice recognition software    Read the chart carefully and recognize, using context, where substitutions have occurred

## 2022-11-14 NOTE — PROGRESS NOTES
Follow up Consultation    Nephrology   Maribeth Stauffer 65 y o  female MRN: [id number]  Unit/Bed#: ICU 03 Encounter: [id number]      Physician Requesting Consult: Musa Barth*        ASSESSMENT/PLAN:  [de-identified] year female with multiple comorbidities including diabetes, obesity and lower extremity wounds presented with left lower foot wound  Nephrology following for acute kidney injury  Acute kidney injury :  Acute kidney injury resolved  NYDIA multifactorial most likely secondary to sepsis plus failure to auto regulate presence of Ace inhibitor plus multiple contrast exposures (10/31, 11/1, 11/7, 11/9, 11/13)  After review of records In [facility] as well as Care everywhere it appears that the patient has a baseline Creatinine of 0 9-1 1 mg/dL  patient was admitted with a creatinine of 1 10 mg/dL on 10/21/2022  patient's creatinine today is at 1 11 mg/dL, remains stable  At risk for acute kidney injury secondary to SANTOS due to contrast exposure on 11/13/2022  Had episode of acute kidney injury on 11/07/2022 with creatinine of 1 40 mg/dL  Clinically appears to be minimally hypervolemic likely secondary to significant hypoalbuminemia its 3rd spacing  Recommend albumin x2 doses and Bumex 1 mg IV x1  check BMP in a m  Optimize hemodynamic status to avoid delay in renal recovery  Place on a renal diet when allowed diet order  Avoid nephrotoxins, adjust meds to appropriate GFR  Strict I/O  Daily weights  Urinary retention protocol if patient does not have a Jacinto  will need to set up patient for follow up with Nephrology as an outpatient post hospitalization  for nephrology as an outpatient patient follows up with no nephrologist  Please inform us when patient is ready for discharge establish care as an outpatient for proteinuria management  Blood pressure/hypertension:  current medications:  Norvasc 10 mg p o  Q day, Cardura 2 mg p o  Q day  recommendations:   Albumin 25 g IV Q 6 x 2 doses and Bumex 1 mg IV x1  Optimize hemodynamics  Maintain MAP > 65mmHg  Avoid BP fluctuations  H/H/anemia:  most recent hemoglobin at 7 grams/deciliter  maintain hemoglobin greater than 8 grams/deciliter  Management per ICU team    Acid-base electrolytes:    Electrolytes:      Hypokalemia:  Most recent potassium 3 2 mEq  Supplement and recheck    Acid-base:    Most recent bicarb at 28 stable    Other medical problems:  Proteinuria: Most recent protein creatinine ratio 5 5 g as of 2022  Likely secondary to diabetic kidney disease however cannot rule out obesity related hyper filtration or secondary FSGS  Patient may benefit from checking ASHISH 2R when stable  A1c has been ranging between 9-13% going back to 2019  CVA acute:  Management per primary team   On argatroban  Lower extremity wounds:  Management per primary team   Peripheral vascular disease  Status post left lower extremity angiogram   Right CFA balloon angioplasty, stent placement  Follow-up with vascular  Patient may need amputation  Thanks for the consult  Will sign off, please re-consult us if patient is to undergo surgical intervention  Please call with questions/ concerns  Above-mentioned orders and Plan in terms of perioperative management was discussed with the team in depth with [facility] fahad Velasco MD, FASN, 2022, 12:51 PM              Objective :   Patient seen and examined in the ICU earlier this a m  Remains hemodynamically stable remains afebrile transferred over to the ICU yesterday due to need for close neuro checks for CVA  Urine output close to 1 4 L last 24 hours status post CT with IV contrast yesterday        PHYSICAL EXAM  /64   Pulse 80   Temp 98 7 °F (37 1 °C) (Oral)   Resp 22   Ht 5' 4" (1 626 m)   Wt 116 kg (255 lb)   SpO2 97%   BMI 43 77 kg/m²   Temp (24hrs), Av 4 °F (36 9 °C), Min:97 8 °F (36 6 °C), Max:99 3 °F (37 4 °C)        Intake/Output Summary (Last 24 hours) at 2022 [address] filed at 11/14/2022 1201  Gross per 24 hour   Intake 143 53 ml   Output 1395 ml   Net -1251 47 ml       I/O last 24 hours: In: 383 5 [P O :240; I V :4 4; IV Piggyback:139 2]  Out: 0475 [Urine:1845]      Current Weight: Weight - Scale: 116 kg (255 lb)  First Weight: Weight - Scale: 98 4 kg (217 lb)  Physical Exam  Vitals and nursing note reviewed  Constitutional:       General: She is not in acute distress  Appearance: Normal appearance  She is obese  She is ill-appearing  She is not toxic-appearing or diaphoretic  HENT:      Head: Normocephalic and atraumatic  Mouth/Throat:      Mouth: Mucous membranes are moist       Pharynx: Oropharynx is clear  No oropharyngeal exudate  Eyes:      General: No scleral icterus  Conjunctiva/sclera: Conjunctivae normal    Cardiovascular:      Rate and Rhythm: Normal rate  Heart sounds: Normal heart sounds  No friction rub  Pulmonary:      Effort: Pulmonary effort is normal  No respiratory distress  Breath sounds: Normal breath sounds  No stridor  No wheezing  Abdominal:      General: There is no distension  Palpations: Abdomen is soft  There is no mass  Tenderness: There is no abdominal tenderness  Musculoskeletal:         General: Swelling present  Cervical back: Normal range of motion and neck supple  No rigidity  Comments: Bilateral lower extremity wounds dressings in place, water filled, 1+ edema   Skin:     General: Skin is warm  Coloration: Skin is not jaundiced  Neurological:      General: No focal deficit present  Mental Status: She is alert  Psychiatric:         Mood and Affect: Mood normal          Behavior: Behavior normal              Review of Systems   Constitutional: Positive for fatigue  HENT: Negative for congestion  Respiratory: Negative for wheezing  Cardiovascular: Positive for leg swelling  Gastrointestinal: Negative for abdominal pain, diarrhea and vomiting  Genitourinary: Negative for hematuria  Musculoskeletal: Negative for back pain  Skin: Positive for wound  Neurological: Negative for headaches  Psychiatric/Behavioral: Negative for agitation  All other systems reviewed and are negative  Scheduled Meds:  Current Facility-Administered Medications   Medication Dose Route Frequency Provider Last Rate   • acetaminophen  650 mg Oral B3R PRN Yolanda Chauhan, CRNP     • amLODIPine  10 mg Oral Daily Randall Petit, CRNP     • argatroban  0 1-3 mcg/kg/min Intravenous Titrated Randall Grigsby, CRNP 0 1 mcg/kg/min (11/14/22 1029)   • atorvastatin  40 mg Oral Daily With [employer], CRNP     • cefTRIAXone  2,000 mg Intravenous U83C Yolanda Chauhan, CRNP 2,000 mg (11/14/22 0134)   • clopidogrel  75 mg Oral Daily Randall Petit, CRNP     • doxazosin  2 mg Oral Daily Randall Petit, CRNP     • gabapentin  200 mg Oral BID Randall Petit, CRNP     • insulin glargine  15 Units Subcutaneous HS Randall Petit, CRNP     • insulin lispro  1-6 Units Subcutaneous TID AC Yolanda Chauhan, CRNP     • naloxone  0 04 mg Intravenous Q1MIN PRN Randall Petit, CRNP     • nystatin   Topical BID Randall Petit, CRNP     • ondansetron  4 mg Intravenous Q6H PRN Randall Petit, CRNP     • oxyCODONE  10 mg Oral Q4H PRN Randall Petit, CRNP     • pantoprazole  40 mg Oral Early Morning Randall Petit, CRNP     • polyethylene glycol  17 g Oral Daily PRN Yolanda Chauhan, CRNP     • QUEtiapine  25 mg Oral HS Randall Petit, CRNP     • senna  1 tablet Oral HS PRN Yolanda Gamaet, CRNP     • sertraline  150 mg Oral Daily Randall Petit, CRNP         PRN Meds: •  acetaminophen  •  naloxone  •  ondansetron  •  oxyCODONE  •  polyethylene glycol  •  senna    Continuous Infusions:argatroban, 0 1-3 mcg/kg/min, Last Rate: 0 1 mcg/kg/min (11/14/22 1029)          Invasive Devices:      Invasive Devices  Report    Peripheral Intravenous Line  Duration           Peripheral IV 11/09/22 Left;Proximal;Upper;Ventral (anterior) Arm 4 days    Peripheral IV 11/13/22 Right;Ventral (anterior) Forearm <1 day          Drain  Duration           Urethral Catheter Latex 16 Fr  7 days                  LABORATORY:    Results from last 7 days   Lab Units 11/14/22  1049 11/14/22  0619 11/13/22  0148 11/12/22  0424 11/11/22  1918 11/11/22  0435 11/10/22  0410 11/09/22  0439 11/08/22  0532 11/07/22  1520   WBC Thousand/uL 16 57*  --  15 00* 15 36*  --  15 69* 16 06* 14 83* 16 57* 13 88*   HEMOGLOBIN g/dL 7 0*  --  7 2* 7 8*  --  8 2* 8 7* 8 6* 8 8* 8 7*   HEMATOCRIT % 21 9*  --  22 8* 24 9*  --  26 4* 28 1* 28 2* 28 7* 27 2*   PLATELETS Thousands/uL 158  --  67* 41* 36* 40* 60* 83* 105* 157   POTASSIUM mmol/L  --  3 2* 3 1* 3 4*  --  3 3* 3 5 3 5 3 6 4 3   CHLORIDE mmol/L  --  102 102 103  --  103 103 105 104 104   CO2 mmol/L  --  28 31 29  --  28 28 26 24 24   BUN mg/dL  --  12 16 17  --  18 15 17 19 19   CREATININE mg/dL  --  1 11 1 08 1 03  --  1 00 0 97 1 02 1 20 1 21   CALCIUM mg/dL  --  8 0* 7 8* 7 9*  --  7 8* 8 0* 7 8* 7 4* 7 5*   MAGNESIUM mg/dL  --   --   --   --   --   --   --   --   --  1 7   PHOSPHORUS mg/dL  --   --   --   --   --   --   --   --   --  5 6*      rest all reviewed    RADIOLOGY:  CTA head and neck w wo contrast   Final Result by Eva Flores MD (11/13 0782)      Moderate bilateral vertebral artery origin plaque stenosis  Bilateral common carotid retropharyngeal course with short segment moderate plaque stenosis  No occlusion or dissection  Workstation performed: [id number]         CT head wo contrast   Final Result by Jaskaran Angel MD (11/13 2909)         1  Moderate-sized acute/recent infarction centered on the right parietal lobe with local mass effect  No acute intracranial hemorrhage                    Workstation performed: [id number]         XR chest portable   Final Result by Jin Clark MD (11/13 3606)      Mild pulmonary edema and trace layering pleural effusions  No definite consolidation  If continued clinical concern, suggest follow-up frontal and lateral views for further evaluation  Workstation performed: [id number]         XR chest portable   Final Result by Tara Fabian MD (11/10 1728)      Mild pulmonary venous congestion  Question trace right effusion  Workstation performed: [id number]         CTA ABDOMEN W RUN OFF W WO CONTRAST   Final Result by Dee Su MD (11/09 1705)      1  Suspect interval occlusion of recently placed proximal right common iliac artery stent   2  No residual filling defect or dissection involving right common femoral artery  Diffusely small diseased right SFA and popliteal artery without focal flow-limiting stenosis identified  Patent recently placed SFA stent  3   Intact anterior tibial artery with stenotic and/or occluded dorsalis pedis artery  Posterior tibial artery occludes at the ankle  Patent peroneal artery without opacification of anterior and posterior communicating arteries  4   Near circumferentially calcified stenosis of proximal left common iliac artery  The lesion measures 30-40% in severity by reconstructions which are compromised by artifact and software limitations and therefore difficult to exclude a focal    flow-limiting stenosis within this segment  5   No significant left common femoral stenosis  SFA is diffusely diseased  Newly placed proximal stent is patent but distal SFA stent is occluded  Popliteal artery is diffusely stenotic  6   Left popliteal artery is diffusely stenotic which seems new from the prior arteriogram and may be related to acute stent occlusion  Left anterior tibial artery is intact  Posterior tibial artery occludes at the calcaneus  7   Small bilateral pleural effusions   8    Small volume ascites new from prior CT with diffuse subcutaneous edema            Workstation performed: [id number] lower limb arterial duplex, complete bilateral   Final Result by Edenilson Mora MD (11/09 1242)      XR chest portable ICU   Final Result by Josue Blake DO (11/09 1612)      Moderate pulmonary edema, similar to the prior examination  No large effusions  Workstation performed: [id number]         VAS lower limb vein mapping bypass graft   Final Result by Edenilson Mora MD (11/03 0945)      CTA ABDOMEN W RUN OFF W WO CONTRAST   Final Result by Kala Cintron MD (11/02 0336)   Vascular:   Left lower extremity:   Interval stenting and lithotripsy of the left superficial femoral artery  Right lower extremity:   1  Interval development of a probable flow-limiting dissection flap at the prior arteriotomy site in the right common femoral artery superimposed upon moderate to severe atherosclerotic disease  2   Focal severe atherosclerotic narrowing at the adductor canal with additional moderate atherosclerotic narrowing throughout the remainder of the right superficial femoral artery  The study was marked in EPIC for significant notification  Workstation performed: [id number]         VAS lower limb arterial duplex, limited, unilateral   Final Result by Edenilson Mora MD (11/02 1658)      VAS SHARIF & waveform analysis, multiple levels   Final Result by Edenilson Mora MD (11/01 1541)      XR chest portable   Final Result by Vida Aponte MD (10/31 1718)      Pulmonary edema has increased since October 26, 2022  Workstation performed: [id number]         IR aortagram with run-off   Final Result by Calixto Laws MD (11/01 2724)      Left lower leg arteriogram with proximal and distal SFA shockwave balloon lithotripsy and stent placement, as well as right common iliac artery stent placement  PLAN:      Follow-up arterial duplex  Patient became short of breath after the procedure, with increased O2 requirement  Etiology most likely volume overload secondary to renal prep as well as contrast and fluid was given during the procedure where she was lying flat  Dr Lisa Braswell from the    primary team made aware  Chest x-ray to be obtained upon arrival to the floor  Workstation performed: [id number]         MRI follow up [facility]   Final Result by Jessica Rios MD (10/28 1614)      Nondiagnostic study  Workstation performed: [id number]         CTA ABDOMEN W RUN OFF W WO CONTRAST   Final Result by Navarro Gordillo MD (10/28 7856)      1  Moderate stenosis of the mid infrarenal abdominal aorta secondary to calcific atherosclerotic disease  2  LEFT: Up to 50% stenosis of the proximal common iliac artery  The proximal and distal 3rd of the superficial femoral artery demonstrates 50-75% stenosis  Popliteal artery demonstrates greater than 75% stenosis in the P2 segment  Patent    three-vessel runoff extending into the left foot  3  RIGHT: Proximal common iliac artery stenosis of up to 50%  50% stenosis of the common femoral artery  Superficial femoral artery demonstrates up to 50% stenosis within the proximal several centimeters, 50-75% stenosis throughout the distal 3rd of    the vessel  Popliteal artery demonstrates scattered stenoses of 50-75%  Patent three-vessel runoff extending into the right foot  Workstation performed: [id number]         MRI ankle/heel left  wo contrast   Final Result by Syed Briggs MD (10/27 1949)      Exam moderately limited due to patient motion  There is a large plantar lateral heel ulcer, without underlying soft tissue abscess (series 5 image 5-15 )      Associated with this ulcer, there is only very minimal reactive marrow edema in the calcaneal tubercle evident on T2-weighted sequences (series 3 image 2, series 7 image 8 ) No confluent T1-weighted marrow abnormality or cortical destruction to suggest    osteomyelitis           Workstation performed: [id number]         XR chest portable   Final Result by Cedric Kang MD (10/27 8822)      Mild pulmonary vascular congestion  Workstation performed: [id number]         VAS lower limb arterial duplex, complete bilateral   Final Result by Roxie Bamberger, MD (10/24 1400)      CT lower extremity w contrast left   Final Result by Jose Martínez DO (10/22 0102)      No acute bony process is seen  Moderate superficial soft tissue edema in the foot, ankle, and leg, superimposed cellulitis considered in the appropriate clinical setting  Correlation with patient's symptoms and laboratory values recommended  No discrete drainable collection    identified  Other findings as above  Workstation performed: [id number]         XR chest 1 view portable   ED Interpretation by Chance Coulter PA-C (10/21 2203)   ED wet read:  Possible mild vascular congestion  Final Result by Raina Reinoso MD (53/86 3322)      No acute cardiopulmonary disease  Workstation performed: [id number]         XR foot 3+ views RIGHT   Final Result by Raina Reinoso MD (84/36 5158)      No acute osseous abnormality  Workstation performed: [id number]         IR lower extremity angiogram    (Results Pending)   IR lower extremity angiogram    (Results Pending)   CT head wo contrast    (Results Pending)     Rest all reviewed    Portions of the record may have been created with voice recognition software  Occasional wrong word or "sound a like" substitutions may have occurred due to the inherent limitations of voice recognition software  Read the chart carefully and recognize, using context, where substitutions have occurred  If you have any questions, please contact the dictating provider

## 2022-11-14 NOTE — ASSESSMENT & PLAN NOTE
Vitals:    11/14/22 0517 11/14/22 0617 11/14/22 0730 11/14/22 0831   BP: 160/93 (!) 173/82 161/74 152/63   BP Location:   Right arm    Pulse: 88 88 84 84   Resp: (!) 28 (!) 31 (!) 28 (!) 43   Temp:    99 3 °F (37 4 °C)   TempSrc:    Oral   SpO2: 91% 96% 98% 97%   Weight:       Height:       Patient's blood pressure has been running high in the 180s, 190s SBP  Patient's home dose of amlodipine 10 mg, lisinopril 40 mg, metoprolol succinate 25 mg daily  Lisinopril was held due to NYDIA  Permissive hypertension in the context of ischemic stroke- goal -160  Plan:  Continue amlodipine 10 mg once daily, hold Coreg 25 mg b i d  Continue hydralazine 10 mg every 6 hours p r n   For SBP greater than 160  Hold off home lisinopril per nephrology due to NYDIA

## 2022-11-14 NOTE — ASSESSMENT & PLAN NOTE
Lab Results   Component Value Date    HGBA1C 9 8 (H) 10/25/2022       Recent Labs     11/13/22  0804 11/13/22  1123 11/13/22  1638 11/14/22  0636   POCGLU 89 86 104 107       Blood Sugar Average: Last 72 hrs:  (P) 011 [id number]   Patient's home medication 60 units glargine at bedtime, Humalog 20 units t i d  Continue glargine 25 unitis    Will increase glargine to 25 units  Pt on CLD

## 2022-11-14 NOTE — SPEECH THERAPY NOTE
Speech Language/Pathology  Speech-Language Pathology Bedside Swallow Evaluation        Patient Name: Ashwin Poole    ZILGD'Z Date: 11/14/2022     Problem List  Principal Problem:    Stroke [facility])  Active Problems:    Acute respiratory failure with hypoxia ([facility] )    Depression, recurrent ([facility] )    Benign essential hypertension    Type 2 diabetes mellitus with hyperglycemia, with long-term current use of insulin ([facility])    Wound of lower extremity    Hypokalemia    PAD (peripheral artery disease) ([facility])    Acute on chronic anemia    Diabetic ulcer of heel associated with diabetes mellitus due to underlying condition ([facility] )    Thrombocytopenia ([facility] )         Past Medical History  Past Medical History:   Diagnosis Date   • Anxiety    • Depression    • Diabetes ([place] [address] )    • Diabetes mellitus ([facility] )    • Esophageal reflux     28 APR 2015 RESOLVED   • Hyperlipidemia    • Hypertension    • Low back pain     sciatica   • Pneumonia 2019   • Stroke ([facility] ) 12/2015    small vessel, L frontal corona radiata  Rx asa 81, Lipitor 40, risk modification   • Vitamin D deficiency        Past Surgical History  Past Surgical History:   Procedure Laterality Date   • ARTERIOGRAM Right 11/7/2022    Procedure: -Right lower extremity angiogram -Right CFA balloon angioplasty with [id number]  [employer]  -Right CFA DCB with 6x40 Bard Lutonix -Right CFA atherectomy with Jetstream and distal embolization protection with 7mm Spider FX -Right SFA/Pop angioplasty with 4x40 Chololate balloon -Right SFA/Pop DCB with 5x150 Bard Lutonix -Right SFA stent with 6x80 W W  [email or web address] x2 -R   • COLONOSCOPY     • IR AORTAGRAM WITH RUN-OFF  10/31/2022       Summary    Pt presents with at least mod oral dysphagia, characterized by prolonged and inefficient mastication and bolus formation of soft solids; pt needed sips of liquid to moisten   This may have been partially due to pt being edentulous (unable to locate her dentures), as well as waxing and waning CHRISTY  Pt needed frequent stimulation to stay awake, but this improved as session progressed  Pharyngeal swallow appears WNL for items assessed; there were no overt s/s of aspiration  Pt is appropriate to begin a conservative PO diet, but should only be fed when fully alert  Recommendations:   Diet: puree/level 1 diet and thin liquids   Meds: whole with puree and crushed with puree, as tolerated  Frequent Oral care: 4x/day  Assist with feeding  Aspiration precautions and compensatory swallowing strategies: upright posture, only feed when fully alert and slow rate of feeding  Other Recommendations/ considerations: ST to see for dysphagia tx, 2-3x per week  Current Medical Status  Copied from admission/physician notes:    Irvin Vizcarra is a 65 y o  female who presents with pmh of IDDM, HTN, hld, obesity depression coming to hospital for left foot wound  Pt notes this has been ongoing for 'a while' and started as a small sore but continued to worsen  She had seen a podiatrist for this and pcp previously but thought it would get better  She endorses she wasn't always compliant with their recommendations  She put off seeking care for this wound for some time due to mental health issues and kristina issues  She saw her PCP for this on 10/21/22 who noted the eschar and sent her to the ED for further evaluation  The pt notes she has been having some fevers at home earlier the week prior as well as episodic n/v and poor po intake  She has not been taking her insulin regimen as prescribed due to depression and kristina issues often stretching it out to once a day with short acting insulin and every other day for her long acting insulin    In ed she was evaluated w/concern for sepsis and left foot wound infection and admission was requested     Stroke [facility])  Assessment & Plan  78-year-old female with history of insulin dependent DM , HTN, HLD, obesity, depression, initially presenting on 10/22 for left LE wounds and subsequent prolonged hospitalization:     Has has very complicated hospitalization: undergone multiple vascular procedures/interventions in both legs, complicated by atheroembolic events in both legs  She was during admission on heparin drip and developed thrombocytopenia, there was also concern for HIT, she is now on argatroban  Patient also met sepsis criteria (treated with IV antibiotics), she was also noted to have hypertensive urgency on arrival, NYDIA which has now resolved  She did have a episode of fluid overload and was in the ICU and recently moved from the ICU to the floor  During admission there was mental status change of unclear time origin; CT head was obtained on 11/13 and noted moderate size acute/recent infarct in the right parietal lobe with local mass effect there was no evidence of acute hemorrhage  Acute right hemispheric moderate size stroke - etiology unclear at this time, need to rule out cardioembolic etiology, 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, also cannot exclude septic emboli as the etiology (see below, blood cultures from 11/13 AM positive for Gram positive cocci in clusters)  Order received and chart reviewed  Discussed with RN  Pt is NPO until seen by ST  She failed nursing dysphagia screen  Past medical history:   Please see H&P for details        Special Studies:  CT head-  Moderate-sized acute/recent infarction centered on the right parietal lobe with local mass effect  No acute intracranial hemorrhage  CTA head/neck  Moderate bilateral vertebral artery origin plaque stenosis  Bilateral common carotid retropharyngeal course with short segment moderate plaque stenosis  No occlusion or dissection    CXR-  Mild pulmonary edema and trace layering pleural effusions  No definite consolidation  If continued clinical concern, suggest follow-up frontal and lateral views for further evaluation           Social/Education/Vocational Hx:  Pt lives unsure    Swallow Information   Current Risks for Dysphagia & Aspiration: CVA and decreased alertness  Current Symptoms/Concerns: pt failed nursing dysphgia screen due to inability to produce a strong cough  Current Diet: NPO except medications   Baseline Diet: regular diet and thin liquids    Baseline Assessment   Behavior/Cognition: waxing and waning arousal level and needed frequent stimulation to stay awake  Speech/Language Status: able to follow commands and limited verbal output, speech did not appear dysarthric  Patient Positioning: upright in bed     Swallow Mechanism Exam   Facial: symmetrical  Labial: decreased ROM left side, mild  Lingual: WFL  Velum: unable to visualize  Mandible:  decreased ROM  Dentition: edentulous  Vocal quality:clear/adequate   Volitional Cough: strong/productive   Respiratory: NC, sats in upper 90s    Consistencies Assessed and Performance   Consistencies Administered: ice chips, thin liquids, puree and soft solids  -included at least 10 oz thin water by cup and straw    Oral Stage: Adequate bolus retrieval and draw from straw, prompt bolus manipulation and transfer of pureed and thin liquids  There was prolonged mastication of soft solids (pt did not have her dentures in), and pt needed sips of liquid to moisten during mastication/bolus formation  Bolus transfer appeared fairly prompt, with no pocketing or residue  Adequate lip seal      Pharyngeal Stage: Hyolaryngeal elevation observed and palpated, swallows appeared prompt  There were no overt s/s of aspiration, including with consecutive sips of thin by straw  Esophageal Concerns: none reported      Results Reviewed with: patient, RN and MD   Dysphagia Goals: 1  Pt will tolerate pureed diet and thin liquids with prompt oropharyngeal swallows and no overt s/s of aspiration  2  Pt will tolerate LRD without s/s of aspiration     Discharge recommendation: Unsure at this time    Speech Therapy Prognosis   Prognosis: good    Prognosis Considerations: age and therapeutic potential

## 2022-11-15 LAB
ABO GROUP BLD: NORMAL
ANION GAP SERPL CALCULATED.3IONS-SCNC: 9 MMOL/L (ref 4–13)
APTT PPP: 115 SECONDS (ref 23–37)
APTT PPP: 120 SECONDS (ref 23–37)
APTT PPP: 81 SECONDS (ref 23–37)
APTT PPP: 94 SECONDS (ref 23–37)
APTT PPP: 96 SECONDS (ref 23–37)
BACTERIA BLD CULT: ABNORMAL
BASOPHILS # BLD AUTO: 0.04 THOUSANDS/ÂΜL (ref 0–0.1)
BLD GP AB SCN SERPL QL: NEGATIVE
BUN SERPL-MCNC: 16 MG/DL (ref 5–25)
CALCIUM SERPL-MCNC: 7.5 MG/DL (ref 8.3–10.1)
CHLORIDE SERPL-SCNC: 101 MMOL/L (ref 96–108)
CO2 SERPL-SCNC: 29 MMOL/L (ref 21–32)
CREAT SERPL-MCNC: 0.95 MG/DL (ref 0.6–1.3)
ERYTHROCYTE [DISTWIDTH] IN BLOOD BY AUTOMATED COUNT: 15.7 % (ref 11.6–15.1)
FERRITIN SERPL-MCNC: 824 NG/ML (ref 8–388)
FOLATE SERPL-MCNC: 10.8 NG/ML (ref 3.1–17.5)
GFR SERPL CREATININE-BSD FRML MDRD: 64 ML/MIN/1.73SQ M
GLUCOSE SERPL-MCNC: 132 MG/DL (ref 65–140)
GLUCOSE SERPL-MCNC: 134 MG/DL (ref 65–140)
GLUCOSE SERPL-MCNC: 134 MG/DL (ref 65–140)
GLUCOSE SERPL-MCNC: 163 MG/DL (ref 65–140)
GLUCOSE SERPL-MCNC: 179 MG/DL (ref 65–140)
GRAM STN SPEC: ABNORMAL
HCT VFR BLD AUTO: 20.6 % (ref 34.8–46.1)
HGB BLD-MCNC: 6.4 G/DL (ref 11.5–15.4)
IMM GRANULOCYTES # BLD AUTO: 0.22 THOUSAND/UL (ref 0–0.2)
IRON SATN MFR SERPL: 14 % (ref 15–50)
IRON SERPL-MCNC: 17 UG/DL (ref 50–170)
MAGNESIUM SERPL-MCNC: 1.8 MG/DL (ref 1.6–2.6)
MCH RBC QN AUTO: 27.1 PG (ref 26.8–34.3)
MCHC RBC AUTO-ENTMCNC: 31.1 G/DL (ref 31.4–37.4)
MCV RBC AUTO: 87 FL (ref 82–98)
MECA+MECC ISLT/SPM QL: DETECTED
NRBC BLD AUTO-RTO: 0 /100 WBCS
PHOSPHATE SERPL-MCNC: 3.9 MG/DL (ref 2.3–4.1)
PLATELET # BLD AUTO: 193 THOUSANDS/UL (ref 149–390)
PMV BLD AUTO: 10 FL (ref 8.9–12.7)
POTASSIUM SERPL-SCNC: 3.5 MMOL/L (ref 3.5–5.3)
PROCALCITONIN SERPL-MCNC: 0.93 NG/ML
RBC # BLD AUTO: 2.36 MILLION/UL (ref 3.81–5.12)
RETICS # AUTO: ABNORMAL 10*3/UL (ref 14097–95744)
RETICS # CALC: 2.34 % (ref 0.37–1.87)
RH BLD: NEGATIVE
S EPIDERMIDIS DNA BLD POS QL NAA+NON-PRB: DETECTED
SODIUM SERPL-SCNC: 139 MMOL/L (ref 135–147)
SPECIMEN EXPIRATION DATE: NORMAL
TIBC SERPL-MCNC: 125 UG/DL (ref 250–450)
VIT B12 SERPL-MCNC: 588 PG/ML (ref 100–900)
WBC # BLD AUTO: 15.62 THOUSAND/UL (ref 4.31–10.16)

## 2022-11-15 PROCEDURE — P9016 RBC LEUKOCYTES REDUCED: HCPCS

## 2022-11-15 PROCEDURE — 99254 IP/OBS CNSLTJ NEW/EST MOD 60: CPT | Performed by: NURSE PRACTITIONER

## 2022-11-15 PROCEDURE — 97164 PT RE-EVAL EST PLAN CARE: CPT

## 2022-11-15 PROCEDURE — 97535 SELF CARE MNGMENT TRAINING: CPT

## 2022-11-15 PROCEDURE — 86900 BLOOD TYPING SEROLOGIC ABO: CPT | Performed by: STUDENT IN AN ORGANIZED HEALTH CARE EDUCATION/TRAINING PROGRAM

## 2022-11-15 PROCEDURE — 85730 THROMBOPLASTIN TIME PARTIAL: CPT | Performed by: STUDENT IN AN ORGANIZED HEALTH CARE EDUCATION/TRAINING PROGRAM

## 2022-11-15 PROCEDURE — 85045 AUTOMATED RETICULOCYTE COUNT: CPT | Performed by: NURSE PRACTITIONER

## 2022-11-15 PROCEDURE — 82728 ASSAY OF FERRITIN: CPT | Performed by: NURSE PRACTITIONER

## 2022-11-15 PROCEDURE — 83735 ASSAY OF MAGNESIUM: CPT

## 2022-11-15 PROCEDURE — 86850 RBC ANTIBODY SCREEN: CPT | Performed by: STUDENT IN AN ORGANIZED HEALTH CARE EDUCATION/TRAINING PROGRAM

## 2022-11-15 PROCEDURE — 97530 THERAPEUTIC ACTIVITIES: CPT

## 2022-11-15 PROCEDURE — 80048 BASIC METABOLIC PNL TOTAL CA: CPT

## 2022-11-15 PROCEDURE — 86901 BLOOD TYPING SEROLOGIC RH(D): CPT | Performed by: STUDENT IN AN ORGANIZED HEALTH CARE EDUCATION/TRAINING PROGRAM

## 2022-11-15 PROCEDURE — 85027 COMPLETE CBC AUTOMATED: CPT

## 2022-11-15 PROCEDURE — 83540 ASSAY OF IRON: CPT | Performed by: NURSE PRACTITIONER

## 2022-11-15 PROCEDURE — 83550 IRON BINDING TEST: CPT | Performed by: NURSE PRACTITIONER

## 2022-11-15 PROCEDURE — 84100 ASSAY OF PHOSPHORUS: CPT

## 2022-11-15 PROCEDURE — 84145 PROCALCITONIN (PCT): CPT

## 2022-11-15 PROCEDURE — 82607 VITAMIN B-12: CPT | Performed by: NURSE PRACTITIONER

## 2022-11-15 PROCEDURE — 97112 NEUROMUSCULAR REEDUCATION: CPT

## 2022-11-15 PROCEDURE — 82948 REAGENT STRIP/BLOOD GLUCOSE: CPT

## 2022-11-15 PROCEDURE — 86923 COMPATIBILITY TEST ELECTRIC: CPT

## 2022-11-15 PROCEDURE — 85730 THROMBOPLASTIN TIME PARTIAL: CPT | Performed by: HOSPITALIST

## 2022-11-15 PROCEDURE — 82746 ASSAY OF FOLIC ACID SERUM: CPT | Performed by: NURSE PRACTITIONER

## 2022-11-15 PROCEDURE — 99232 SBSQ HOSP IP/OBS MODERATE 35: CPT | Performed by: STUDENT IN AN ORGANIZED HEALTH CARE EDUCATION/TRAINING PROGRAM

## 2022-11-15 PROCEDURE — 97168 OT RE-EVAL EST PLAN CARE: CPT

## 2022-11-15 RX ORDER — ALPRAZOLAM 0.25 MG/1
0.25 TABLET ORAL DAILY PRN
Status: DISCONTINUED | OUTPATIENT
Start: 2022-11-15 | End: 2023-01-12 | Stop reason: HOSPADM

## 2022-11-15 RX ADMIN — DOXAZOSIN 2 MG: 2 TABLET ORAL at 09:21

## 2022-11-15 RX ADMIN — GABAPENTIN 200 MG: 100 CAPSULE ORAL at 17:09

## 2022-11-15 RX ADMIN — QUETIAPINE FUMARATE 25 MG: 25 TABLET ORAL at 22:03

## 2022-11-15 RX ADMIN — INSULIN GLARGINE 15 UNITS: 100 INJECTION, SOLUTION SUBCUTANEOUS at 22:03

## 2022-11-15 RX ADMIN — CLOPIDOGREL BISULFATE 75 MG: 75 TABLET ORAL at 09:21

## 2022-11-15 RX ADMIN — AMLODIPINE BESYLATE 10 MG: 10 TABLET ORAL at 09:21

## 2022-11-15 RX ADMIN — ACETAMINOPHEN 650 MG: 325 TABLET, FILM COATED ORAL at 18:21

## 2022-11-15 RX ADMIN — SERTRALINE HYDROCHLORIDE 150 MG: 100 TABLET ORAL at 09:21

## 2022-11-15 RX ADMIN — CEFTRIAXONE SODIUM 2000 MG: 10 INJECTION, POWDER, FOR SOLUTION INTRAVENOUS at 01:01

## 2022-11-15 RX ADMIN — OXYCODONE HYDROCHLORIDE 10 MG: 10 TABLET ORAL at 09:27

## 2022-11-15 RX ADMIN — ATORVASTATIN CALCIUM 40 MG: 40 TABLET, FILM COATED ORAL at 17:09

## 2022-11-15 RX ADMIN — GABAPENTIN 200 MG: 100 CAPSULE ORAL at 09:21

## 2022-11-15 RX ADMIN — NYSTATIN: 100000 POWDER TOPICAL at 09:22

## 2022-11-15 RX ADMIN — NYSTATIN: 100000 POWDER TOPICAL at 17:10

## 2022-11-15 RX ADMIN — OXYCODONE HYDROCHLORIDE 10 MG: 10 TABLET ORAL at 15:17

## 2022-11-15 NOTE — PLAN OF CARE
Problem: OCCUPATIONAL THERAPY ADULT  Goal: Performs self-care activities at highest level of function for planned discharge setting  See evaluation for individualized goals  Description: Treatment Interventions: ADL retraining, Functional transfer training, UE strengthening/ROM, Endurance training, Cognitive reorientation, Patient/family training, Equipment evaluation/education, Compensatory technique education, Energy conservation, Activityengagement          See flowsheet documentation for full assessment, interventions and recommendations  Outcome: Progressing  Note: Limitation: Decreased ADL status, Decreased UE strength, Decreased endurance, Decreased high-level ADLs (New L visual field deficits)  Prognosis: Good  Assessment: Pt is a 65 y o  female seen for OT re-evaluation s/p admit to [facility] on 10/21/2022 now w/ Stroke [facility])  Pt requires Maximal Assistance x2 person assist for bed mobility and some basic ADLs 2* the following deficits impacting occupational performance: decreased strength, decreased balance, decreased tolerance, impaired problem solving, increased pain and new L visual field deficits  Pt to benefit from continued skilled OT tx while in the hospital to address deficits as defined above and maximize level of functional independence w ADL's and functional mobility  Occupational Performance areas to address include: grooming, bathing/shower, toilet hygiene, dressing and functional mobility  From OT standpoint, recommendation at time of d/c would be inpatient rehab       OT Discharge Recommendation: Post acute rehabilitation services        Malcom Krabbe, OTR/L

## 2022-11-15 NOTE — PROGRESS NOTES
[address] [facility]  [facility] 1960, 65 y o  female MRN: [id number]  Unit/Bed#: E4 -01 Encounter: [id number]  Primary Care Provider: ALEX Schofield   Date and time admitted to hospital: 10/21/2022  7:58 PM    * Stroke [facility])  Assessment & Plan  65year old female is currently admitted due to a moderate to large volume left MCA territory stroke  · Repeat CT head showed an evolving acute infarct in right parietal-temporal-occipital lobes with questionable petechial cortical hemorrhage in right parietal lobe  Discussed with Dr Ang Nichols  Recommended that we continue anticoagulation for now due to the clinical insignificance of the size and the risk of taking her off anticoagulation far outweighs the benefits off of it  · Awaiting final neurology recommendations      PAD (peripheral artery disease) ([facility] )  Assessment & Plan  Uncontrolled diabetes, hypertension, hyperlipidemia  Lost to follow-up and poorly compliant to diabetic regimen  · S/p abdominal aortogram,  LLE SFA shockwave angioplasty/FATOU 10/31/22   · S/p R femoral endarterectomy 11/7  · S/p aspiration thrombectomy in right common iliac artery and left SFA  · Heparin drip transitioned to Argatroban in the setting of thrombocytopenia and concern for HIT  · Continue statin / Plavix  · Awaiting vascular surgery's plan for intervention    UTI (urinary tract infection)  Assessment & Plan  UA positive   Cultures growing Enterobacter  · Infectious disease consulted to determine optimal duration of antibiotic therapy  · Unfortunately, pa catheter is currently in place due to multiple wounds    Thrombocytopenia ([facility] )  Assessment & Plan  Possibly due to HIT  · Awaiting heparin antibody by serotonin release  · Heparin induced antibody elevated    Recent Labs     11/13/22  0148 11/14/22  1049 11/15/22  0559   PLT 67* 158 193         Diabetic ulcer of heel associated with diabetes mellitus due to underlying condition [facility])  Assessment & Plan  · Podiatry recommendations appreciated: RLE is still cool to touch, foot is unlikely to be salvageable  Reviewed vascular note, Agree with allowing to demarcate, unfortunately she will ultimately need major limb amputation  Left heel eschar dry, stable  No current plan for podiatry intervention  · Vascular surgery recommendations: will continue discussion regarding limb salvageability and possible need for amputation       Acute on chronic anemia  Assessment & Plan  S/p 1 unit transfused 11/3/2022 again today 11/15/2022  Transfuse if less than 7    Recent Labs     11/14/22  1049 11/15/22  0559   HGB 7 0* 6 4*         Hypokalemia  Assessment & Plan  Likely due to GI losses  Resolved  · Continue repleting PRN    Recent Labs     11/13/22  0148 11/14/22  0619 11/15/22  0559   K 3 1* 3 2* 3 5   MG  --   --  1 8         Type 2 diabetes mellitus with hyperglycemia, with long-term current use of insulin [facility])  Assessment & Plan  Lab Results   Component Value Date    HGBA1C 9 8 (H) 10/25/2022     Recent Labs     11/14/22  2157 11/15/22  0735 11/15/22  1051 11/15/22  1607   POCGLU 204* 132 163* 134     · Inpatient regimen: Sliding scale, Glargine 15U HS    Benign essential hypertension  Assessment & Plan  Above goal  · Continue amlodipine, Cardura    Depression, recurrent (HCC)  Assessment & Plan  Evaluated by Psychiatry on this admission  · Continue Zoloft 150 mg daily / Quetiapine 25mg po Qhs    Acute respiratory failure with hypoxia ([address] )  Assessment & Plan  Echo on 11/07 shows 64% LVEF, no diastolic dysfunction  Postprocedural hypoxia  · Currently on 2L NC    VTE Pharmacologic Prophylaxis:   Pharmacologic: Argatroban  Mechanical VTE Prophylaxis in Place: Yes    Discussions with Specialists or Other Care Team Provider: nursing    Education and Discussions with Family / Patient: patient, daughter    Time Spent for Care: 30 minutes    More than 50% of total time spent on counseling and coordination of care as described above  Current Length of Stay: 24 day(s)    Current Patient Status: Inpatient   Certification Statement: The patient will continue to require additional inpatient hospital stay due to agatroban, vascular surgery intervention, anemia, prbc transfusion    Discharge Plan: active    Code Status: Level 1 - Full Code      Subjective:   Patient seen and examined with her daughter at bedside  No new complaints overnight  Objective:     Vitals:   Temp (24hrs), Av 4 °F (36 9 °C), Min:97 5 °F (36 4 °C), Max:99 7 °F (37 6 °C)    Temp:  [97 5 °F (36 4 °C)-99 7 °F (37 6 °C)] 98 3 °F (36 8 °C)  HR:  [] 106  Resp:  [18-20] 20  BP: (143-159)/(59-75) 145/75  SpO2:  [92 %-96 %] 92 %  Body mass index is 41 55 kg/m²  Input and Output Summary (last 24 hours): Intake/Output Summary (Last 24 hours) at 11/15/2022 1802  Last data filed at 11/15/2022 1602  Gross per 24 hour   Intake 324 17 ml   Output 250 ml   Net 74 17 ml       Physical Exam:     Physical Exam  Vitals reviewed  HENT:      Head: Normocephalic  Nose: Nose normal       Mouth/Throat:      Mouth: Mucous membranes are moist    Eyes:      General: No scleral icterus  Cardiovascular:      Rate and Rhythm: Normal rate  Pulmonary:      Effort: Pulmonary effort is normal  No respiratory distress  Abdominal:      General: There is no distension  Palpations: Abdomen is soft  Tenderness: There is no abdominal tenderness  Genitourinary:     Comments: Jacinto catheter in place  Musculoskeletal:      Comments: BLE blisters with poorly healing wounds   Skin:     General: Skin is warm  Neurological:      Mental Status: She is alert and oriented to person, place, and time     Psychiatric:         Mood and Affect: Mood normal          Behavior: Behavior normal        Additional Data:     Labs:    Results from last 7 days   Lab Units 11/15/22  0559 22  1049 22  0148   WBC Thousand/uL 15 62* 16 57* 15 00*   HEMOGLOBIN g/dL 6 4* 7 0* 7 2*   HEMATOCRIT % 20 6* 21 9* 22 8*   PLATELETS Thousands/uL 193 158 67*   NEUTROS PCT %  --   --  84*   LYMPHS PCT %  --   --  5*   LYMPHO PCT %  --  4*  --    MONOS PCT %  --   --  9   MONO PCT %  --  3*  --    EOS PCT %  --  1 1     Results from last 7 days   Lab Units 11/15/22  0559 11/14/22  0619 11/13/22  0148   SODIUM mmol/L 139   < > 140   POTASSIUM mmol/L 3 5   < > 3 1*   CHLORIDE mmol/L 101   < > 102   CO2 mmol/L 29   < > 31   BUN mg/dL 16   < > 16   CREATININE mg/dL 0 95   < > 1 08   ANION GAP mmol/L 9   < > 7   CALCIUM mg/dL 7 5*   < > 7 8*   ALBUMIN g/dL  --   --  1 5*   TOTAL BILIRUBIN mg/dL  --   --  0 67   ALK PHOS U/L  --   --  232*   ALT U/L  --   --  53   AST U/L  --   --  114*   GLUCOSE RANDOM mg/dL 134   < > 85    < > = values in this interval not displayed  Results from last 7 days   Lab Units 11/11/22  1918   INR  3 70*     Results from last 7 days   Lab Units 11/15/22  1607 11/15/22  1051 11/15/22  0735 11/14/22  2157 11/14/22  1722 11/14/22  1203 11/14/22  0636 11/13/22  1638 11/13/22  1123 11/13/22  0804 11/12/22  2126 11/12/22  1631   POC GLUCOSE mg/dl 134 163* 132 204* 186* 146* 107 104 86 89 96 100         Results from last 7 days   Lab Units 11/15/22  0559 11/14/22  0619 11/13/22  0148   LACTIC ACID mmol/L  --   --  1 0   PROCALCITONIN ng/ml 0 93* 1 00* 0 48*           * I Have Reviewed All Lab Data Listed Above  * Additional Pertinent Lab Tests Reviewed:  Nilda Hernandez Admission Reviewed      Lines:  Invasive Devices  Report    Peripheral Intravenous Line  Duration           Peripheral IV 11/15/22 Left Antecubital <1 day    Peripheral IV 11/15/22 Right Antecubital <1 day          Drain  Duration           Urethral Catheter Latex 16 Fr  8 days               Imaging:    Imaging Reports Reviewed Today Include: imaging since admission    Recent Cultures (last 7 days):     Results from last 7 days   Lab Units 11/13/22  010 11/12/22  1625   BLOOD CULTURE  No Growth at 48 hrs    Staphylococcus coagulase negative*  --    GRAM STAIN RESULT  Gram positive cocci in clusters*  --    URINE CULTURE   --  >100,000 cfu/ml Enterobacter cloacae complex*  70,000-79,000 cfu/ml Yeast*       Last 24 Hours Medication List:   Current Facility-Administered Medications   Medication Dose Route Frequency Provider Last Rate   • acetaminophen  650 mg Oral Q6H PRN Shae Larsen MD     • ALPRAZolam  0 25 mg Oral Daily PRN Umesh Baltazar MD     • amLODIPine  10 mg Oral Daily Shae Larsen MD     • argatroban  0 1-3 mcg/kg/min Intravenous Titrated Jennyfer Aquino MD Stopped (11/14/22 1455)   • atorvastatin  40 mg Oral Daily With Eloy Swanson MD     • cefTRIAXone  2,000 mg Intravenous Q24H Jennyfer qAuino MD 2,000 mg (11/15/22 0101)   • [MAR Hold] chlorhexidine  15 mL Swish & Spit Once Olga Marcum DO     • clopidogrel  75 mg Oral Daily Jennyfer Aquino MD     • doxazosin  2 mg Oral Daily Jennyfer Aquino MD     • gabapentin  200 mg Oral BID Jennyfer Aquino MD     • heparin 2000 units and papaverine 60 mg in isolyte equivalent 500 mL irrigation bag   Irrigation Once Doloris Goldmann, MD     • insulin glargine  15 Units Subcutaneous HS Jennyfer Aquino MD     • insulin lispro  1-6 Units Subcutaneous TID AC Jennyfer Aquino MD     • lactated ringers  125 mL/hr Intravenous Continuous Kelly venkatesh Lewis DO     • naloxone  0 04 mg Intravenous Q1MIN PRN Jennyfer Aquino MD     • nystatin   Topical BID Jennyfer Aquino MD     • ondansetron  4 mg Intravenous Q6H PRN Jennyfer Aquino MD     • oxyCODONE  10 mg Oral Q4H PRN Jennyfer Aquino MD     • pantoprazole  40 mg Oral Early Morning Jennyfer Aquino MD     • polyethylene glycol  17 g Oral Daily PRN Leif Thorpe MD     • QUEtiapine  25 mg Oral HS Leif Thorpe MD     • senna  1 tablet Oral HS PRN Leif Thorpe MD     • sertraline  150 mg Oral Daily Leif Thorpe MD          Today, Patient Was Seen By: Raffy Shi MD    ** Please Note: Dictation voice to text software may have been used in the creation of this document   **

## 2022-11-15 NOTE — ASSESSMENT & PLAN NOTE
Echo on 11/07 shows 57% LVEF, no diastolic dysfunction   Postprocedural hypoxia  · Currently on 2L NC

## 2022-11-15 NOTE — PLAN OF CARE
Problem: Potential for Falls  Goal: Patient will remain free of falls  Description: INTERVENTIONS:  - Educate patient/family on patient safety including physical limitations  - Instruct patient to call for assistance with activity   - Consult OT/PT to assist with strengthening/mobility   - Keep Call bell within reach  - Keep bed low and locked with side rails adjusted as appropriate  - Keep care items and personal belongings within reach  - Initiate and maintain comfort rounds  - Make Fall Risk Sign visible to staff  - Offer Toileting every 2 Hours, in advance of need  - Initiate/Maintain bed  chair alarm  - Obtain necessary fall risk management equipment: bed chair alarm, call bell, gripper sock, walker, bedside commode  - Apply yellow socks and bracelet for high fall risk patients  - Consider moving patient to room near nurses station  Outcome: Progressing     Problem: MOBILITY - ADULT  Goal: Maintain or return to baseline ADL function  Description: INTERVENTIONS:  -  Assess patient's ability to carry out ADLs; assess patient's baseline for ADL function and identify physical deficits which impact ability to perform ADLs (bathing, care of mouth/teeth, toileting, grooming, dressing, etc )  - Assess/evaluate cause of self-care deficits   - Assess range of motion  - Assess patient's mobility; develop plan if impaired  - Assess patient's need for assistive devices and provide as appropriate  - Encourage maximum independence but intervene and supervise when necessary  - Involve family in performance of ADLs  - Assess for home care needs following discharge   - Consider OT consult to assist with ADL evaluation and planning for discharge  - Provide patient education as appropriate  Outcome: Progressing  Goal: Maintains/Returns to pre admission functional level  Description: INTERVENTIONS:  - Perform BMAT or MOVE assessment daily    - Set and communicate daily mobility goal to care team and patient/family/caregiver     - Collaborate with rehabilitation services on mobility goals if consulted  - Assist patient in repositioning every 2 hours    - Dangle patient 3 times a day  - Stand patient 3 times a day  - Out of bed to chair as tolerated  - Out of bed for meals  - Out of bed for toileting  - Record patient progress and toleration of activity level   Outcome: Progressing     Problem: PAIN - ADULT  Goal: Verbalizes/displays adequate comfort level or baseline comfort level  Description: Interventions:  - Encourage patient to monitor pain and request assistance  - Assess pain using appropriate pain scale  - Administer analgesics based on type and severity of pain and evaluate response  - Implement non-pharmacological measures as appropriate and evaluate response  - Consider cultural and social influences on pain and pain management  - Notify physician/advanced practitioner if interventions unsuccessful or patient reports new pain  Outcome: Progressing     Problem: INFECTION - ADULT  Goal: Absence or prevention of progression during hospitalization  Description: INTERVENTIONS:  - Assess and monitor for signs and symptoms of infection  - Monitor lab/diagnostic results  - Monitor all insertion sites, i e  indwelling lines, tubes, and drains  - Administer medications as ordered  - Instruct and encourage patient and family to use good hand hygiene technique  Outcome: Progressing     Problem: SAFETY ADULT  Goal: Patient will remain free of falls  Description: INTERVENTIONS:  - Educate patient/family on patient safety including physical limitations  - Instruct patient to call for assistance with activity   - Consult OT/PT to assist with strengthening/mobility   - Keep Call bell within reach  - Keep bed low and locked with side rails adjusted as appropriate  - Keep care items and personal belongings within reach  - Initiate and maintain comfort rounds  - Make Fall Risk Sign visible to staff  - Offer Toileting every 2 Hours, in advance of need  - Initiate/Maintain bed  chair alarm  - Obtain necessary fall risk management equipment: bed chair alarm, call bell, gripper sock, walker, bedside commode  - Apply yellow socks and bracelet for high fall risk patients  - Consider moving patient to room near nurses station  Outcome: Progressing  Goal: Maintain or return to baseline ADL function  Description: INTERVENTIONS:  -  Assess patient's ability to carry out ADLs; assess patient's baseline for ADL function and identify physical deficits which impact ability to perform ADLs (bathing, care of mouth/teeth, toileting, grooming, dressing, etc )  - Assess/evaluate cause of self-care deficits   - Assess range of motion  - Assess patient's mobility; develop plan if impaired  - Assess patient's need for assistive devices and provide as appropriate  - Encourage maximum independence but intervene and supervise when necessary  - Involve family in performance of ADLs  - Assess for home care needs following discharge   - Consider OT consult to assist with ADL evaluation and planning for discharge  - Provide patient education as appropriate  Outcome: Progressing  Goal: Maintains/Returns to pre admission functional level  Description: INTERVENTIONS:  - Perform BMAT or MOVE assessment daily    - Set and communicate daily mobility goal to care team and patient/family/caregiver  - Collaborate with rehabilitation services on mobility goals if consulted  - Assist patient in repositioning every 2 hours    - Dangle patient 3 times a day  - Stand patient 3 times a day  - Out of bed to chair as tolerated  - Out of bed for meals  - Out of bed for toileting  - Record patient progress and toleration of activity level   Outcome: Progressing     Problem: DISCHARGE PLANNING  Goal: Discharge to home or other facility with appropriate resources  Description: INTERVENTIONS:  - Identify barriers to discharge w/patient   - Arrange for needed discharge resources and transportation as appropriate  - Identify discharge learning needs  - Refer to Case Management Department for coordinating discharge planning if the patient needs post-hospital services based on physician/advanced practitioner order   Outcome: Progressing     Problem: Knowledge Deficit  Goal: Patient/family/caregiver demonstrates understanding of disease process, treatment plan, medications, and discharge instructions  Description: Complete learning assessment and assess knowledge base    Interventions:  - Provide teaching at level of understanding  - Provide teaching via preferred learning methods  Outcome: Progressing     Problem: METABOLIC, FLUID AND ELECTROLYTES - ADULT  Goal: Electrolytes maintained within normal limits  Description: INTERVENTIONS:  - Monitor labs and assess patient for signs and symptoms of electrolyte imbalances  - Administer electrolyte replacement as ordered  - Monitor response to electrolyte replacements, including repeat lab results as appropriate  - Instruct patient on fluid and nutrition as appropriate  Outcome: Progressing  Goal: Fluid balance maintained  Description: INTERVENTIONS:  - Monitor labs   - Monitor I/O and WT  - Instruct patient on fluid and nutrition as appropriate  - Assess for signs & symptoms of volume excess or deficit  Outcome: Progressing  Goal: Glucose maintained within target range  Description: INTERVENTIONS:  - Monitor Blood Glucose as ordered  - Assess for signs and symptoms of hyperglycemia and hypoglycemia  - Administer ordered medications to maintain glucose within target range  - Assess nutritional intake and initiate nutrition service referral as needed  Outcome: Progressing     Problem: SKIN/TISSUE INTEGRITY - ADULT  Goal: Skin Integrity remains intact(Skin Breakdown Prevention)  Description: Assess:  -Perform Nicanor assessment every shift  -Clean and moisturize skin every shift  -Inspect skin when repositioning, toileting, and assisting with ADLS  -Assess under medical devices   -Assess extremities for adequate circulation and sensation     Bed Management:  -Have minimal linens on bed & keep smooth, unwrinkled  -Change linens as needed when moist or perspiring  -Avoid sitting or lying in one position for more than 2 hours while in bed    Toileting:  -Offer bedside commode  -Assess for incontinence every 2 hours  -Use incontinent care products after each incontinent episode     Activity:  -Mobilize patient 3 times a day  -Encourage or provide ROM exercises   -Turn and reposition patient every 2 Hours  -Use appropriate equipment to lift or move patient in bed  -Instruct/ Assist with weight shifting every 15 minutes when out of bed in chair  -Consider limitation of chair time 1 hour intervals    Skin Care:  -Avoid use of baby powder, tape, friction and shearing, hot water or constrictive clothing  -Relieve pressure over bony prominences using waffle cushion when in chair  -Do not massage red bony areas    Outcome: Progressing  Goal: Incision(s), wounds(s) or drain site(s) healing without S/S of infection  Description: INTERVENTIONS  - Assess and document dressing, incision, wound bed, drain sites and surrounding tissue  - Provide patient and family education  - Perform skin care/dressing changes everyday as ordered  Outcome: Progressing  Goal: Pressure injury heals and does not worsen  Description: Interventions:  - Implement low air loss mattress or specialty surface (Criteria met)  - Apply silicone foam dressing  - Instruct/assist with weight shifting every 15 minutes when in chair   - Limit chair time to 1 hour intervals  - Use special pressure reducing interventions such as waffle cushion when in chair   - Perform passive or active ROM   - Turn and reposition patient & offload bony prominences every 2 hours   - Utilize friction reducing device or surface for transfers   - Consider consults to  interdisciplinary teams such as wound care, PT/OT  - Use incontinent care products after each incontinent episode   - Consider nutrition services referral as needed  Outcome: Progressing     Problem: MUSCULOSKELETAL - ADULT  Goal: Maintain or return mobility to safest level of function  Description: INTERVENTIONS:  - Assess patient's ability to carry out ADLs; assess patient's baseline for ADL function and identify physical deficits which impact ability to perform ADLs (bathing, care of mouth/teeth, toileting, grooming, dressing, etc )  - Assess/evaluate cause of self-care deficits   - Assess range of motion  - Assess patient's mobility  - Assess patient's need for assistive devices and provide as appropriate  - Encourage maximum independence but intervene and supervise when necessary  - Involve family in performance of ADLs  - Assess for home care needs following discharge   - Consider OT consult to assist with ADL evaluation and planning for discharge  - Provide patient education as appropriate  Outcome: Progressing  Goal: Maintain proper alignment of affected body part  Description: INTERVENTIONS:  - Support, maintain and protect limb and body alignment  - Provide patient/ family with appropriate education  Outcome: Progressing     Problem: CARDIOVASCULAR - ADULT  Goal: Maintains optimal cardiac output and hemodynamic stability  Description: INTERVENTIONS:  - Monitor I/O, vital signs and rhythm  - Monitor for S/S and trends of decreased cardiac output  - Administer and titrate ordered vasoactive medications to optimize hemodynamic stability  - Assess quality of pulses, skin color and temperature  - Assess for signs of decreased coronary artery perfusion  - Instruct patient to report change in severity of symptoms  Outcome: Progressing     Problem: RESPIRATORY - ADULT  Goal: Achieves optimal ventilation and oxygenation  Description: INTERVENTIONS:  - Assess for changes in respiratory status  - Assess for changes in mentation and behavior  - Position to facilitate oxygenation and minimize respiratory effort  - Oxygen administered by appropriate delivery if ordered  - Initiate smoking cessation education as indicated  - Encourage broncho-pulmonary hygiene including cough, deep breathe, Incentive Spirometry  - Assess the need for suctioning and aspirate as needed  - Assess and instruct to report SOB or any respiratory difficulty  - Respiratory Therapy support as indicated  Outcome: Progressing     Problem: Prexisting or High Potential for Compromised Skin Integrity  Goal: Skin integrity is maintained or improved  Description: INTERVENTIONS:  - Identify patients at risk for skin breakdown  - Assess and monitor skin integrity  - Assess and monitor nutrition and hydration status  - Monitor labs   - Assess for incontinence   - Turn and reposition patient  - Assist with mobility/ambulation  - Relieve pressure over bony prominences  - Avoid friction and shearing  - Provide appropriate hygiene as needed including keeping skin clean and dry  - Evaluate need for skin moisturizer/barrier cream  - Collaborate with interdisciplinary team   - Patient/family teaching  - Consider wound care consult   Outcome: Progressing     Problem: Nutrition/Hydration-ADULT  Goal: Nutrient/Hydration intake appropriate for improving, restoring or maintaining nutritional needs  Description: Monitor and assess patient's nutrition/hydration status for malnutrition  Collaborate with interdisciplinary team and initiate plan and interventions as ordered  Monitor patient's weight and dietary intake as ordered or per policy  Utilize nutrition screening tool and intervene as necessary  Determine patient's food preferences and provide high-protein, high-caloric foods as appropriate       INTERVENTIONS:  - Monitor oral intake, urinary output, labs, and treatment plans  - Assess nutrition and hydration status and recommend course of action  - Evaluate amount of meals eaten  - Assist patient with eating if necessary   - Allow adequate time for meals  - Recommend/ encourage appropriate diets, oral nutritional supplements, and vitamin/mineral supplements  - Order, calculate, and assess calorie counts as needed  - Recommend, monitor, and adjust tube feedings and TPN/PPN based on assessed needs  - Assess need for intravenous fluids  - Provide specific nutrition/hydration education as appropriate  - Include patient/family/caregiver in decisions related to nutrition  Outcome: Progressing

## 2022-11-15 NOTE — ASSESSMENT & PLAN NOTE
Lab Results   Component Value Date    HGBA1C 9 8 (H) 10/25/2022     Recent Labs     11/14/22  2157 11/15/22  0735 11/15/22  1051 11/15/22  1607   POCGLU 204* 132 163* 134     · Inpatient regimen: Sliding scale, Glargine 15U HS

## 2022-11-15 NOTE — ASSESSMENT & PLAN NOTE
65year old female is currently admitted due to a moderate to large volume left MCA territory stroke  · Repeat CT head showed an evolving acute infarct in right parietal-temporal-occipital lobes with questionable petechial cortical hemorrhage in right parietal lobe  Discussed with Dr Dillon Barcenas  Recommended that we continue anticoagulation for now due to the clinical insignificance of the size and the risk of taking her off anticoagulation far outweighs the benefits off of it    · Awaiting final neurology recommendations

## 2022-11-15 NOTE — ASSESSMENT & PLAN NOTE
· Podiatry recommendations appreciated: RLE is still cool to touch, foot is unlikely to be salvageable  Reviewed vascular note, Agree with allowing to demarcate, unfortunately she will ultimately need major limb amputation  Left heel eschar dry, stable  No current plan for podiatry intervention    · Vascular surgery recommendations: will continue discussion regarding limb salvageability and possible need for amputation

## 2022-11-15 NOTE — OCCUPATIONAL THERAPY NOTE
Occupational Therapy Evaluation     Patient Name: Reuben Palencia  BRZDX'T Date: 11/15/2022    Problem List  Principal Problem:    Stroke [facility])  Active Problems:    Acute respiratory failure with hypoxia ([facility] )    Depression, recurrent ([facility] )    Benign essential hypertension    Type 2 diabetes mellitus with hyperglycemia, with long-term current use of insulin ([facility])    Wound of lower extremity    Hypokalemia    PAD (peripheral artery disease) ([facility])    Acute on chronic anemia    Diabetic ulcer of heel associated with diabetes mellitus due to underlying condition ([facility] )    Thrombocytopenia ([facility] )    Diarrhea    Past Medical History  Past Medical History:   Diagnosis Date    Anxiety     Depression     Diabetes ([facility] )     Diabetes mellitus ([facility] )     Esophageal reflux     28 APR 2015 RESOLVED    Hyperlipidemia     Hypertension     Low back pain     sciatica    Pneumonia 2019    Stroke ([facility] ) 12/2015    small vessel, L frontal corona radiata   Rx asa 81, Lipitor 40, risk modification    Vitamin D deficiency      Past Surgical History  Past Surgical History:   Procedure Laterality Date    ARTERIOGRAM Right 11/7/2022    Procedure: -Right lower extremity angiogram -Right CFA balloon angioplasty with [id number]  [employer]  -Right CFA DCB with 6x40 Bard Lutonix -Right CFA atherectomy with Jetstream and distal embolization protection with 7mm Spider FX -Right SFA/Pop angioplasty with 4x40 Chololate balloon -Right SFA/Pop DCB with 5x150 Bard Lutonix -Right SFA stent with 6x80 W W  [employer] x2 -R    COLONOSCOPY      IR AORTAGRAM WITH RUN-OFF  10/31/2022         11/15/22 1235   OT Last Visit   OT Visit Date 11/15/22   Note Type   Note type Re-Evaluation   Pain Assessment   Pain Assessment Tool 0-10   Pain Score 9   Pain Location/Orientation Orientation: Bilateral;Location: Leg   Pain Onset/Description Onset: Ongoing   Patient's Stated Pain Goal No pain   Hospital Pain Intervention(s) Medication (See MAR);Repositioned   Restrictions/Precautions   Weight Bearing Precautions Per Order Yes   RLE Weight Bearing Per Order WBAT   LLE Weight Bearing Per Order WBAT   Other Precautions Cognitive; Chair Alarm; Bed Alarm;Multiple lines;Telemetry; Fall Risk;Pain;Visual impairment   Home Living   Additional Comments see initial OT evaluation from 10/25   Prior Function   Comments see initial OT evaluation from 10/25   General   Family/Caregiver Present No   Additional General Comments Pt received supine in bed, NAD, PIV access, dressings to BLEs, 2L O2 NC   Subjective   Subjective Pt agreeable to therapy re-evaluation   ADL   Eating Assistance 5  Supervision/Setup   Eating Deficit Setup   Grooming Assistance 5  Supervision/Setup   Grooming Deficit Setup;Supervision/safety; Increased time to complete  (increased time to open mouthwash)   UB Dressing Assistance 3  Moderate Assistance   LB Dressing Assistance 1  Total Assistance   Toileting Assistance  1  Total Assistance   Bed Mobility   Supine to Sit 2  Maximal assistance   Additional items Assist x 2;HOB elevated; Bedrails; Increased time required;Verbal cues;LE management   Sit to Supine 2  Maximal assistance   Additional items Assist x 2; Increased time required;Verbal cues;LE management   Transfers   Sit to Stand Unable to assess   Additional items   (2/2 pain level)   Functional Mobility   Functional Mobility 2  Maximal assistance   Additional Comments assist x2   Additional items   (for bed mobility)   Activity Tolerance   Activity Tolerance Patient limited by pain   Medical Staff Made Aware Wilma PT  Pt seen for co-evaluation with Physical Therapist due to pt's medical complexity, functional limitations and limited activity tolerance      Nurse Made Aware Michelle HERNÁNDEZ   RUE Assessment   RUE Assessment WFL   LUE Assessment   LUE Assessment   (grossly 4/5 throughout)   Hand Function   Gross Motor Coordination   (mildly impaired LUE)   Fine Motor Coordination   (impaired LUE) Sensation   Light Touch   (pt denies)   Vision-Basic Assessment   Current Vision Wears glasses only for reading   Patient Visual Report   (difficulty with finding objects on the L (pt initially not aware))   Vision - Complex Assessment   Ocular Range of Motion Intact   Tracking Impaired   Saccades Impaired   Visual Fields   (Pt with impaired L VF)   Acuity Able to read employee name badge without difficulty  (when within R visual field, unable to read badge in L visual field)   Visual Screen Results Left sided karen-inattention; Left homonymous hemianopsia  (will continue to assess)   Psychosocial   Psychosocial (WDL) WDL   Cognition   Overall Cognitive Status WFL   Arousal/Participation Alert; Cooperative   Attention Within functional limits   Orientation Level Oriented to person;Oriented to place;Oriented to situation  (to month and year but not extact date)   Memory Decreased recall of recent events   Following Commands Follows one step commands without difficulty   Assessment   Limitation Decreased ADL status; Decreased UE strength;Decreased endurance;Decreased high-level ADLs  (New L visual field deficits)   Assessment Pt is a 65 y o  female seen for OT re-evaluation s/p admit to [facility] on 10/21/2022 now w/ Stroke [facility])  Pt requires Maximal Assistance x2 person assist for bed mobility and some basic ADLs 2* the following deficits impacting occupational performance: decreased strength, decreased balance, decreased tolerance, impaired problem solving, increased pain and new L visual field deficits  Pt to benefit from continued skilled OT tx while in the hospital to address deficits as defined above and maximize level of functional independence w ADL's and functional mobility  Occupational Performance areas to address include: grooming, bathing/shower, toilet hygiene, dressing and functional mobility  From OT standpoint, recommendation at time of d/c would be inpatient rehab     Goals   Patient Goals to be able to move more independently   Plan   Treatment Interventions ADL retraining;Visual perceptual retraining;Functional transfer training;UE strengthening/ROM; Endurance training;Patient/family training   Goal Expiration Date 11/29/22   OT Treatment Day 6   OT Frequency 3-5x/wk   Recommendation   OT Discharge Recommendation Post acute rehabilitation services   Additional Comments  The patient's raw score on the AM-PAC Daily Activity inpatient short form is 12, standardized score is 30 6, less than 39 4  Patients at this level are likely to benefit from discharge to post-acute rehabilitation services  Please refer to the recommendation of the Occupational Therapist for safe discharge planning     AM-PAC Daily Activity Inpatient   Lower Body Dressing 1   Bathing 2   Toileting 1   Upper Body Dressing 2   Grooming 3   Eating 3   Daily Activity Raw Score 12   Daily Activity Standardized Score (Calc for Raw Score >=11) 30 6   AM-[facility] Applied Cognition Inpatient   Following a Speech/Presentation 4   Understanding Ordinary Conversation 4   Taking Medications 3   Remembering Where Things Are Placed or Put Away 3   Remembering List of 4-5 Errands 3   Taking Care of Complicated Tasks 3   Applied Cognition Raw Score 20   Applied Cognition Standardized Score 41 76       Goals: to be met by 11/29/22    Patient will perform functional bed mobility with Minimal Assistance, with HOB flat, no rails  Patient will perform functional transfers with Moderate Assistance using LRAD in preparation for ADL tasks, with good safety awareness  Patient will perform UB dressing task with [address] while seated, with set up  Patient will perform LB dressing task with Moderate Assistance  Patient will perform toilet transfer with Moderate Assistance to [facility]  Patient will perform toileting with Moderate Assistance, including hygiene and clothing management   Patient will increase BUE strength by 1 MM grade in preparation to increase ability to participate in ADL tasks  Patient will improve activity tolerance by participating in 20 minutes of session at a time in preparation for participation in ADL tasks  Patient will identify 3 potential fall hazards and identify compensatory techniques to decrease fall risk in the home environment  Patient will attend to 100% of cognitive task during session   Patient will utilize compensatory techniques independently to compensate for L visual field impairment during ADLs       Roxy Cordero, OTR/L

## 2022-11-15 NOTE — NURSING NOTE
Assumed care at 2300 transfer from the ICU received report from [facility]  Patient arrived to the unit was transferred over to our bed  Patient is alert to self and disoriented to time, place, and situation she on 2l N/C no sob noted, pa present with bill urine present  Patient NPO at [facility] for possible surgery, patient argatroban gtt is on hold until PTT is therapeutic, last PTT was 115 at 0600 Patient Hgb this morning was 6 4 oncall provider ( Kurt Rees ) was notified and made aware no new orders at this time  Incoming nurse was given report and will resume care

## 2022-11-15 NOTE — ASSESSMENT & PLAN NOTE
S/p 1 unit transfused 11/3/2022 again today 11/15/2022    Transfuse if less than 7    Recent Labs     11/14/22  1049 11/15/22  0559   HGB 7 0* 6 4*

## 2022-11-15 NOTE — PLAN OF CARE
Problem: PHYSICAL THERAPY ADULT  Goal: Performs mobility at highest level of function for planned discharge setting  See evaluation for individualized goals  Description: Treatment/Interventions: Functional transfer training, LE strengthening/ROM, Therapeutic exercise, Endurance training, Patient/family training, Equipment eval/education, Bed mobility, Compensatory technique education, Gait training, Continued evaluation, Spoke to nursing, Spoke to MD, Spoke to case management, OT          See flowsheet documentation for full assessment, interventions and recommendations  Outcome: Progressing  Note: Prognosis: Fair  Problem List: Decreased strength, Decreased range of motion, Decreased endurance, Impaired balance, Decreased mobility, Decreased coordination, Impaired judgement, Decreased safety awareness, Impaired vision, Obesity, Impaired tone, Decreased skin integrity, Pain  Assessment: Shanon Chambers is seen for PT re-evaluation and treatment  CT head 11/14 : Evolving acute infarct in right parietal-temporal-occipital lobes with questionable petechial cortical hemorrhage in right parietal lobe  Currently presents with functional limitations related to B/L LE pain and edema with compromised vasculature, foot wounds  Impaired overall strength, but especially BLEs  Impaired LE ROM, impaired posture, functional mobility and balance  Inability to perform functional transfers or locomotion given impairments as noted above  LE pain significant  RLE preference to position with increased hip ER and flexion as well as knee flexion  Education provided regarding same  Presents with L inattention/neglect with visual impairments  Increased time and cues to track past midline needed with R gaze preference  Requires max A of 2 for bed mobility  Increased time and cues needed to complete  Once seated EOB, tolerated x 10 minutes to work on static and dynamic sitting balance activities  Fatigued following session    Returned to supine and repositioned to minimize RLE ER/flexion  Given impairments with significant functional decline from independence, will require skilled PT in order to optimize outcomes  The patient's AM-PAC Basic Mobility Inpatient Short Form Raw Score is 6  A Raw score of less than or equal to 16 suggests the patient may benefit from discharge to post-acute rehabilitation services  Please also refer to the recommendation of the Physical Therapist for safe discharge planning  Pt will require STR at d/c in order to address impairments and optimize outcomes  Barriers to Discharge: Inaccessible home environment  Barriers to Discharge Comments: alone  PT Discharge Recommendation: Post acute rehabilitation services    See flowsheet documentation for full assessment

## 2022-11-15 NOTE — ASSESSMENT & PLAN NOTE
UA positive   Cultures growing Enterobacter  · Infectious disease consulted to determine optimal duration of antibiotic therapy  · Unfortunately, pa catheter is currently in place due to multiple wounds

## 2022-11-15 NOTE — UTILIZATION REVIEW
Continued Stay Review    Date: 11/15/2022                   Current Patient Class: Inpatient   Current Level of Care: Del Laureano y o  female initially admitted on 10/22 to [address] [place] [address]  with Non healing left heel wound, Hypertensive urgency, Abnormal EKG, Sepsis, Hypokalemia  11/7 - ICU for post right CFA balloon angioplasty angiogram/atherectomy, CFA stent  Perioperative blood loss  IR 11/10 for  1  Disrupted proximal margin of right common iliac artery stent with stenosis and possible stent thrombosis treated with aspiration thrombectomy, 7 mm PTA, and 8 mm Express LD balloon expandable stent placement with good result  Palpable right common femoral pulse  No residual stenosis  No embolus to the right common femoral bifurcation  2  Thrombosed distal left SFA stent treated with aspiration thrombectomy, PTA with distal protection, and additional 6 x 60 mm loop via stent placement across distal margin with good result  No residual stenosis  3  Diffuse left popliteal artery disease treated with 4 mm angioplasty with no residual stenosis  4  Patent anterior tibial artery but with abnormal flow distally which may be related lack of outflow  4 mg tPA instilled into the anterior tibial artery  No further intervention performed  5  Mynx closure right common femoral artery  Successful  After the procedure patient was noted to have desaturated to 85% SpO2, and needed 5 L via face mask to saturate greater than 90%  Level 1 Stepdown  11/12 Downgraded to [employer]    Assessment/Plan:   11/13: Change in mental status noted -CT scan of the head was performed which showed a moderate sized acute recent infarct of the right parietal lobe with local mass effect with no evidence of acute hemorrhage  Stroke alert was called and patient  transferred back to ICU for monitoring as she is high risk for conversion to hemorrhagic stroke  Neuro consulted    Per Neuro: Acute right hemispheric moderate size stroke - etiology unclear at this time, need to rule out cardioembolic etiology, 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, also cannot exclude septic emboli as the etiology  Intracranial imaging to be completed to rule out significant stenosis or occlusion  Stroke pathway, MRI when safe to perform, CTA Head & neck, repeat echo, may need LEON  Plavix and Agatroban, currently - the plan is to continue this medication - she is at risk for bleed, but there is risk for more embolic events off of this medication, risk /benefit - recommend frequent neurological checks and stat ct of the head with any changes in examination  Avoid hypotension, blood pressure goal per neuro-perspective 145-534 systolic  Frequent neuro checks  Recommend CT of head on 11/14 to monitor swelling and to monitor for development of hemorrhagic conversion  Peak swelling is 3-5 days post stroke, monitor examination closely    11/15: Seen by Hem/Onc for Anemia -Hg drop 6 4  Suspicion of HIT at some point, thrombocytopenia resolved  Heparin changed to argatroban  Ordered iron panel, Vitamin B-12, folate  Daily CBC  Continue transfusion support for hemoglobin <7 g/dL, or if bleeding  Patient due to receive 1 unit today for hgb of 6 4  Vascular surgery following; will continue discussion regarding limb preservation and possible need for amputation  Argatroban drip on hold till PTT therapeutic- checking ptt q4hs  Per Neuro-okay to defer MRI as it is unlikely to alter management   PT/OT recommending rehab    Vital Signs:   11/15/22 1405 98 2 °F (36 8 °C) 84 20 150/71 -- 92 % 28 2 L/min Nasal cannula --   11/15/22 1346 97 5 °F (36 4 °C) 86 20 159/66 -- -- -- -- -- --   11/15/22 1119 -- 85 18 148/67 97 95 % -- -- None (Room air) Lying   11/15/22 0927 -- -- -- -- -- -- 28 2 L/min Nasal cannula --   11/15/22 0736 98 3 °F (36 8 °C) 83 18 149/59 89 96 % -- -- Nasal cannula Lying   11/14/22 2330 99 7 °F (37 6 °C) 82 19 143/74 -- 96 % 28 2 L/min Nasal cannula --   11/14/22 2300 -- -- -- -- -- -- 28 2 L/min Nasal cannula --   11/14/22 2005 98 1 °F (36 7 °C) -- -- -- -- -- -- -- -- --   11/14/22 1617 98 5 °F (36 9 °C) 78 7 Abnormal  151/71 95 97 % 28 2 L/min Nasal cannula Lying   11/14/22 1517 -- 80 8 Abnormal  139/62 91 97 % -- -- -- --   11/14/22 1417 -- 78 23 Abnormal  141/58 84 97 % -- -- -- --   11/14/22 1317 -- 78 29 Abnormal  146/62 83 99 % -- -- -- --   11/14/22 1217 -- 78 8 Abnormal  145/67 106 97 % 28 2 L/min Nasal cannula Lying   11/14/22 1157 98 7 °F (37 1 °C) -- -- -- -- -- -- -- -- --   11/14/22 1117 -- 80 22 151/64 97 97 % -- -- -- --   11/14/22 1036 -- 82 -- 133/68 -- -- -- -- -- --   11/14/22 1030 -- 82 27 Abnormal  134/84 113 98 % -- -- -- --   11/14/22 0930 -- 83 27 Abnormal  152/63 130 97 % -- -- -- --   11/14/22 0831 99 3 °F (37 4 °C) 84 43 Abnormal  152/63 89 97 % -- -- -- --   11/14/22 0730 -- 84 28 Abnormal  161/74 112 98 % 32 3 L/min Nasal cannula Lying         Pertinent Labs/Diagnostic Results:   11/14 CT Head: Evolving acute infarct in right parietal-temporal-occipital lobes with questionable petechial cortical hemorrhage in right parietal lobe  No new acute intracranial abnormality  11/13 CT head & neck: Moderate bilateral vertebral artery origin plaque stenosis  Bilateral common carotid retropharyngeal course with short segment moderate plaque stenosis  No occlusion or dissection  11/13 CT Head: Moderate-sized acute/recent infarction centered on the right parietal lobe with local mass effect  No acute intracranial hemorrhage      11/13 CXR: Mild pulmonary edema and trace layering pleural effusions  No definite consolidation    If continued clinical concern, suggest follow-up frontal and lateral views for further evaluation      Results from last 7 days   Lab Units 11/08/22  1954   SARS-COV-2  Negative     Results from last 7 days   Lab Units 11/15/22  0559 11/14/22  1049 11/13/22  0148 11/12/22  0424 11/11/22  1918 11/11/22  0435   WBC Thousand/uL 15 62* 16 57* 15 00* 15 36*  --  15 69*   HEMOGLOBIN g/dL 6 4* 7 0* 7 2* 7 8*  --  8 2*   HEMATOCRIT % 20 6* 21 9* 22 8* 24 9*  --  26 4*   PLATELETS Thousands/uL 193 158 67* 41*   < > 40*   NEUTROS ABS Thousands/µL  --   --  12 66*  --   --   --     < > = values in this interval not displayed       Results from last 7 days   Lab Units 11/15/22  0559   RETIC CT ABS  54,500   RETIC CT PCT % 2 34*     Results from last 7 days   Lab Units 11/15/22  0559 11/14/22  0619 11/13/22  0148 11/12/22  0424 11/11/22  0435   SODIUM mmol/L 139 140 140 140 139   POTASSIUM mmol/L 3 5 3 2* 3 1* 3 4* 3 3*   CHLORIDE mmol/L 101 102 102 103 103   CO2 mmol/L 29 28 31 29 28   ANION GAP mmol/L 9 10 7 8 8   BUN mg/dL 16 12 16 17 18   CREATININE mg/dL 0 95 1 11 1 08 1 03 1 00   EGFR ml/min/1 73sq m 64 53 55 58 60   CALCIUM mg/dL 7 5* 8 0* 7 8* 7 9* 7 8*   MAGNESIUM mg/dL 1 8  --   --   --   --    PHOSPHORUS mg/dL 3 9  --   --   --   --      Results from last 7 days   Lab Units 11/13/22  0148 11/12/22  0424 11/11/22  0435   AST U/L 114* 95* 98*   ALT U/L 53 46 44   ALK PHOS U/L 232* 102 112   TOTAL PROTEIN g/dL 5 9*  5 2* 5 8* 6 0*   ALBUMIN g/dL 1 5* 1 5* 1 6*   TOTAL BILIRUBIN mg/dL 0 67 0 48 0 49   BILIRUBIN DIRECT mg/dL  --  0 18 0 12     Results from last 7 days   Lab Units 11/15/22  1607 11/15/22  1051 11/15/22  0735 11/14/22  2157 11/14/22  1722 11/14/22  1203 11/14/22  0636 11/13/22  1638 11/13/22  1123 11/13/22  0804 11/12/22  2126 11/12/22  1631   POC GLUCOSE mg/dl 134 163* 132 204* 186* 146* 107 104 86 89 96 100     Results from last 7 days   Lab Units 11/15/22  0559 11/14/22  0619 11/13/22  0148 11/12/22  0424 11/11/22  0435 11/10/22  0410 11/09/22  0439   GLUCOSE RANDOM mg/dL 134 104 85 107 141* 130 158*     Results from last 7 days   Lab Units 11/10/22  2031   PH ART  7 458*   PCO2 ART mm Hg 35 3*   PO2 ART mm Hg 73 3*   HCO3 ART mmol/L 24 4   BASE EXC ART mmol/L 0 7   O2 CONTENT ART mL/dL 12 4*   O2 HGB, ARTERIAL % 94 0   ABG SOURCE  Radial, Left     Results from last 7 days   Lab Units 11/14/22  0619 11/09/22  0439   CK TOTAL U/L 2,728* 1,911*   CK MB INDEX % <1 0 2 1   CK MB ng/mL 12 0* 39 3*     Results from last 7 days   Lab Units 11/11/22 1918   D-DIMER QUANTITATIVE ug/ml FEU 3 19*     Results from last 7 days   Lab Units 11/15/22  1643 11/15/22  1050 11/15/22  0559 11/11/22  2115 11/11/22 1918   PROTIME seconds  --   --   --   --  36 4*   INR   --   --   --   --  3 70*   PTT seconds 94* 96* 115*   < > 86*    < > = values in this interval not displayed  Results from last 7 days   Lab Units 11/15/22  0559 11/14/22  0619 11/13/22  0148   PROCALCITONIN ng/ml 0 93* 1 00* 0 48*     Results from last 7 days   Lab Units 11/13/22  0148   LACTIC ACID mmol/L 1 0     Results from last 7 days   Lab Units 11/13/22  1203 11/12/22  1625   CLARITY UA   --  Slightly Cloudy   COLOR UA   --  Yellow   SPEC GRAV UA   --  1 025   PH UA   --  5 5   GLUCOSE UA mg/dl  --  Negative   KETONES UA mg/dl  --  Negative   BLOOD UA   --  Large*   PROTEIN UA mg/dl  --  >=300*   NITRITE UA   --  Positive*   BILIRUBIN UA   --  Negative   UROBILINOGEN UA E U /dl  --  1 0   LEUKOCYTES UA   --  Trace*   WBC UA /hpf  --  Innumerable*   RBC UA /hpf  --  1-2*   BACTERIA UA /hpf  --  Moderate*   EPITHELIAL CELLS WET PREP /hpf  --  Occasional   CREATININE UR mg/dL 84 6  --    PROTEIN UR mg/dL 468  --    PROT/CREAT RATIO UR  5 53*  --      Results from last 7 days   Lab Units 11/13/22  0109 11/12/22  1625   BLOOD CULTURE  No Growth at 48 hrs    Staphylococcus coagulase negative*  --    GRAM STAIN RESULT  Gram positive cocci in clusters*  --    URINE CULTURE   --  >100,000 cfu/ml Enterobacter cloacae complex*  70,000-79,000 cfu/ml Yeast*     Medications:   Scheduled Medications:  amLODIPine, 10 mg, Oral, Daily  atorvastatin, 40 mg, Oral, Daily With Dinner  cefTRIAXone, 2,000 mg, Intravenous, Q24H  [MAR Hold] chlorhexidine, 15 mL, Swish & Spit, Once  clopidogrel, 75 mg, Oral, Daily  doxazosin, 2 mg, Oral, Daily  gabapentin, 200 mg, Oral, BID  insulin glargine, 15 Units, Subcutaneous, HS  insulin lispro, 1-6 Units, Subcutaneous, TID AC  nystatin, , Topical, BID  pantoprazole, 40 mg, Oral, Early Morning  QUEtiapine, 25 mg, Oral, HS  sertraline, 150 mg, Oral, Daily    Continuous IV Infusions:  argatroban, 0 1-3 mcg/kg/min, Intravenous, Titrated  lactated ringers, 125 mL/hr, Intravenous, Continuous    PRN Meds:  acetaminophen, 650 mg, Oral, Q6H PRN  naloxone, 0 04 mg, Intravenous, Q1MIN PRN  ondansetron, 4 mg, Intravenous, Q6H PRN  oxyCODONE, 10 mg, Oral, Q4H PRN x 1 thus far today  polyethylene glycol, 17 g, Oral, Daily PRN  senna, 1 tablet, Oral, HS PRN    Transfuse 1 unit PRBC's 11/15  Discharge Plan: D    Network Utilization Review Department  ATTENTION: Please call with any questions or concerns to [phone] and carefully listen to the prompts so that you are directed to the right person  All voicemails are confidential   Willi Lowery all requests for admission clinical reviews, approved or denied determinations and any other requests to dedicated fax number below belonging to the campus where the patient is receiving treatment   List of dedicated fax numbers for the Facilities:  [address] DENIALS (Administrative/Medical Necessity) [phone]   [address] [place] [address] (Maternity/NICU/Pediatrics) [phone]   [address] [phone]   [facility] [phone]   [facility] [phone]   [facility] [address] [place] [phone] [address] [address] Ron Reynoso [phone]

## 2022-11-15 NOTE — ASSESSMENT & PLAN NOTE
Uncontrolled diabetes, hypertension, hyperlipidemia  Lost to follow-up and poorly compliant to diabetic regimen  · S/p abdominal aortogram,  LLE SFA shockwave angioplasty/FATOU 10/31/22   · S/p R femoral endarterectomy 11/7  · S/p aspiration thrombectomy in right common iliac artery and left SFA  · Heparin drip transitioned to Argatroban in the setting of thrombocytopenia and concern for HIT     · Continue statin / Plavix  · Awaiting vascular surgery's plan for intervention

## 2022-11-15 NOTE — PLAN OF CARE
Problem: PAIN - ADULT  Goal: Verbalizes/displays adequate comfort level or baseline comfort level  Description: Interventions:  - Encourage patient to monitor pain and request assistance  - Assess pain using appropriate pain scale  - Administer analgesics based on type and severity of pain and evaluate response  - Implement non-pharmacological measures as appropriate and evaluate response  - Consider cultural and social influences on pain and pain management  - Notify physician/advanced practitioner if interventions unsuccessful or patient reports new pain  Outcome: Progressing     Problem: INFECTION - ADULT  Goal: Absence or prevention of progression during hospitalization  Description: INTERVENTIONS:  - Assess and monitor for signs and symptoms of infection  - Monitor lab/diagnostic results  - Monitor all insertion sites, i e  indwelling lines, tubes, and drains  - Administer medications as ordered  - Instruct and encourage patient and family to use good hand hygiene technique  Outcome: Progressing

## 2022-11-15 NOTE — NURSING NOTE
Instructed per REAL Epstein, to continue holding argatroban gtt until ptt is therapeutic at 58-80  Continue taking ptts every 4 hours and resume once ptt is therapeutic  Awaiting orders to be placed regarding this communication  Continuing to monitor ptt's, next to be checked at 1450

## 2022-11-15 NOTE — PROGRESS NOTES
Due to pt being hypertheraputic since 11/12, per day shift nursing critical care team stated that Argatroban drip is to be on hold until PTT is therapeutic within range of 60-90 seconds, and then restarted  This information was passed along to [facility] when patient was transferred to floor

## 2022-11-15 NOTE — ASSESSMENT & PLAN NOTE
Likely due to GI losses   Resolved  · Continue repleting PRN    Recent Labs     11/13/22  0148 11/14/22  0619 11/15/22  0559   K 3 1* 3 2* 3 5   MG  --   --  1 8

## 2022-11-15 NOTE — ASSESSMENT & PLAN NOTE
Possibly due to HIT  · Awaiting heparin antibody by serotonin release  · Heparin induced antibody elevated    Recent Labs     11/13/22  0148 11/14/22  1049 11/15/22  0559   PLT 67* 158 193

## 2022-11-15 NOTE — PHYSICAL THERAPY NOTE
PT RE-EVALUATION 12:28-12:43  Treat: 12:43-11:06    65 y o     [id number]    Hypokalemia [E87 6]  UTI (urinary tract infection) [N39 0]  OREILLY (dyspnea on exertion) [R06 09]  Hypertension [I10]  Necrotic toes (HCC) [I96]  Non healing left heel wound [S91 302A]  Open wound of foot, complicated, left, initial encounter [S91 302A]  Sepsis ([facility] ) [A41 9]    Past Medical History:   Diagnosis Date    Anxiety     Depression     Diabetes ([facility] )     [facility]     Esophageal reflux     28 APR 2015 RESOLVED    Hyperlipidemia     Hypertension     Low back pain     sciatica    Pneumonia 2019    Stroke [facility] ) 12/2015    small vessel, L frontal corona radiata  Rx asa 81, Lipitor 40, risk modification    Vitamin D deficiency          Past Surgical History:   Procedure Laterality Date    ARTERIOGRAM Right 11/7/2022    Procedure: -Right lower extremity angiogram -Right CFA balloon angioplasty with [id number]  [employer]  -Right CFA DCB with 6x40 Bard Lutonix -Right CFA atherectomy with Jetstream and distal embolization protection with 7mm Spider FX -Right SFA/Pop angioplasty with 4x40 Chololate balloon -Right SFA/Pop DCB with 5x150 Bard Lutonix -Right SFA stent with [employer] x2 -R    COLONOSCOPY      IR AORTAGRAM WITH RUN-OFF  10/31/2022        11/15/22 1228   PT Last Visit   PT Visit Date 11/15/22   Note Type   Note type Re-Evaluation  (and treatment)   Pain Assessment   Pain Score 9   Pain Location/Orientation Orientation: Bilateral;Location: Leg   Restrictions/Precautions   Weight Bearing Precautions Per Order Yes   RLE Weight Bearing Per Order WBAT   LLE Weight Bearing Per Order WBAT   Other Precautions Cognitive; Chair Alarm; Bed Alarm; Fall Risk;Pain;Telemetry;Multiple lines;O2;Visual impairment  (L neglect)   Home Living   Additional Comments see initial PT evaluation 10/25   Prior Function   Level of [place] Independent with ADLs; Independent with functional mobility; Independent with IADLS   General   Additional Pertinent History PT re-evaluation, + for new CVA  Family/Caregiver Present No   Cognition   Overall Cognitive Status WFL   Orientation Level Oriented to person;Oriented to situation;Oriented to place  (+ month and year)   Following Commands Follows one step commands without difficulty   Subjective   Subjective Much pain in legs  Encouragement required to participate with mobility  RUE Assessment   RUE Assessment WFL   LUE Assessment   LUE Assessment X  (Decreased attention, observed at least 3/5 functionally )   RLE Assessment   RLE Assessment X  (preference for positioning in increased hip/knee flexion wtih increased hip ER )   Strength RLE   R Hip Flexion 2-/5   R Knee Extension 2/5   R Ankle Dorsiflexion 0/5   R Ankle Plantar Flexion 0/5   LLE Assessment   LLE Assessment X  (pain)   Strength LLE   L Hip Flexion 2-/5   L Knee Extension 3/5   L Ankle Dorsiflexion 0/5   L Ankle Plantar Flexion 0/5   Vision-Basic Assessment   Patient Visual Report Other (Comment)  (L neglect/visual impairments  Cues to track past midline )   Bed Mobility   Rolling R 2  Maximal assistance   Additional items Assist x 2; Increased time required;Verbal cues;LE management; Bedrails;HOB elevated   Rolling L 2  Maximal assistance   Additional items Assist x 2; Increased time required;Verbal cues;LE management   Supine to Sit 2  Maximal assistance   Additional items Assist x 2; Increased time required;Verbal cues;LE management   Sit to Supine 2  Maximal assistance   Additional items Assist x 2; Increased time required;Verbal cues;LE management   Additional Comments Increased time to complete transition  EOB sitting tolerance x 10 minutes  Transfers   Sit to Stand Unable to assess  (GUERO IS RECOMMENDED )   Additional Comments Not appropriate for transfers given LE weakness and impairments in balance, LE pain  GUERO IS REC FOR OOB     Ambulation/Elevation   Gait pattern Not appropriate   Balance   Static Sitting Poor +   Dynamic Sitting Poor   Static Standing Zero   Endurance Deficit   Endurance Deficit Yes   Endurance Deficit Description pain, fatigue, weakness, deconditioning  Activity Tolerance   Activity Tolerance Patient limited by fatigue;Patient limited by pain;Treatment limited secondary to medical complications (Comment)   Medical Staff Made Aware NurseAraceli: Pt seen for co-evaluation/treatment with skilled Occupational Therapist 2* clinically unstable/unpredictable presentation, medical complexity, fall risk,  functional/physical limitations, impaired functional balance, decreased safety awareness, limited activity tolerance which is decline from PLOF and may impact overall functional mobility/mobility safety  Nurse Made Aware yes   Assessment   Prognosis Fair   Problem List Decreased strength;Decreased range of motion;Decreased endurance; Impaired balance;Decreased mobility; Decreased coordination; Impaired judgement;Decreased safety awareness; Impaired vision;Obesity; Impaired tone;Decreased skin integrity;Pain   Assessment Claireminnie Robles is seen for PT re-evaluation and treatment  CT head 11/14 : Evolving acute infarct in right parietal-temporal-occipital lobes with questionable petechial cortical hemorrhage in right parietal lobe  Currently presents with functional limitations related to B/L LE pain and edema with compromised vasculature, foot wounds  Impaired overall strength, but especially BLEs  Impaired LE ROM, impaired posture, functional mobility and balance  Inability to perform functional transfers or locomotion given impairments as noted above  LE pain significant  RLE preference to position with increased hip ER and flexion as well as knee flexion  Education provided regarding same  Presents with L inattention/neglect with visual impairments  Increased time and cues to track past midline needed with R gaze preference  Requires max A of 2 for bed mobility    Increased time and cues needed to complete  Once seated EOB, tolerated x 10 minutes to work on static and dynamic sitting balance activities  Fatigued following session  Returned to supine and repositioned to minimize RLE ER/flexion  Given impairments with significant functional decline from independence, will require skilled PT in order to optimize outcomes  The patient's AM-PAC Basic Mobility Inpatient Short Form Raw Score is 6  A Raw score of less than or equal to 16 suggests the patient may benefit from discharge to post-acute rehabilitation services  Please also refer to the recommendation of the Physical Therapist for safe discharge planning  Pt will require STR at d/c in order to address impairments and optimize outcomes  Barriers to Discharge Inaccessible home environment   Barriers to Discharge Comments alone   Goals   Patient Goals to be able to move more independently   STG Expiration Date 11/29/22   Short Term Goal #1 14 days: 1)  Pt will perform bed mobility with modA demonstrating appropriate technique 100% of the time in order to improve function  2)  Tolerate EOB sitting x 30 minutes in order to improve endurance to functional tasks  3)  Improve overall strength and balance 1/2 grade in order to optimize ability to perform functional tasks and reduce fall risk  4) Increase activity tolerance to 45 minutes in order to improve endurance to functional tasks  5)  Further assess functional mobility and revise goals  6) PT for ongoing patient and family/caregiver education, DME needs and d/c planning in order to promote highest level of function in least restrictive environment  Plan   Treatment/Interventions Functional transfer training;LE strengthening/ROM; Therapeutic exercise; Endurance training;Patient/family training;Equipment eval/education; Bed mobility; Compensatory technique education;Continued evaluation;Spoke to nursing;OT   PT Frequency 3-5x/wk   Recommendation   PT Discharge Recommendation Post acute rehabilitation services   AM-PAC Basic Mobility Inpatient   Turning in Bed Without Bedrails 1   Lying on Back to Sitting on Edge of Flat Bed 1   Moving Bed to Chair 1   Standing Up From Chair 1   Walk in Room 1   Climb 3-5 Stairs 1   Basic Mobility Inpatient Raw Score 6   Highest Level Of Mobility   JH-HLM Goal 2: Bed activities/Dependent transfer   JH-HLM Achieved 3: Sit at edge of bed   End of Consult   Patient Position at End of Consult Supine; All needs within reach;Bed/Chair alarm activated       Matias Kyle, PT

## 2022-11-15 NOTE — CONSULTS
Medical Oncology/Hematology Consult Note  Annalise Simmons, female, 65 y o , 1960,  [address] [facility] /E4 -*, [phone]     Reason for consultation: Anemia  LOS: 25 days     Assessment and Plan:  1  Acute anemia  -Evident since admission on 10/21/22 at 10 1 g/dL  Normal in 2/2022  Hemoglobin trend: 6 4 (11/15) <-7 0 (11/14) <--7 8 (11/12)  MCV 87, MCHC 31 1  -s/p right CFA balloon angioplasty angiogram/atherectomy, CFA stent placement on 11/7/22, experienced some perioperative blood loss  -Period of acute kidney, was followed by Nephrology  NYDIA has now resolved  2   Peripheral vascular disease   -Presence of non-healing left heel wound lower, s/p right CFA balloon angioplasty angiogram/atherectomy, CFA stent placement on 11/7/22; experienced some perioperative blood loss  -Followed by Vascular  -May possibly need amputation    3  Stroke [facility])  -Multiple vascular procedures/interventions in [facility], complicated by atheroembolic events    -On 43/90, CT head was obtained on 11/13 precipitated by mental status change  Results showed moderate size acute/recent infarct in the right parietal lobe with local mass effect     -Etiology of infarct unclear; need to r/o if cardioembolic source/septic emboli  -Patient has no focal deficits on examination    LABS (11/15): Platelets 514F WNL  WBC 15 62 elevated   Retic absolute: 54,500 normal   Percentage 2 34%, slightly elevated    PTT trend: 96 secs (11/15) <-108 (11/14)  Argatroban on hold until PTT achieves 60-90 secs  Heparin-induced platelet absolute: 9 277  Suspicion of HIT at some point, thrombocytopenia resolved  Heparin changed to argatroban  PLAN:  1) Additional hematology labs ordered: iron panel, Vitamin B-12, folate  Daily CBC  2) Continue transfusion support for hemoglobin <7 g/dL, or if bleeding   Patient due to receive 1 unit today for hgb of 6 4   3) Vascular surgery following; will continue discussion regarding limb preservation and possible need for amputation  4) Outpatient follow up plan: TBD  Communication with team:  Communicated with primary team  Reviewed this patient with Dr Edward Rojas  Thank you for this consult  Flower Bradford, RONALD, CRNP  Hematology-Oncology    History of present illness:  Ashwin Poole is a 65 y o  female with history of insulin dependent DM , HTN, HLD, obesity, depression, initially presenting on 10/22 for non-healing LE wounds and subsequent prolonged hospitalization  She has a long-standing hx of PAD s/p multiple BLE endovascular interventions c/b atheroembolic events to BLE, now with subacute right-sided CVA on CT head  Results showed moderate size acute/recent infarct in the right parietal lobe with local mass effect  This was obtained on 11/13 precipitated by mental status change  Etiology of infarct unclear; need to r/o if cardioembolic source/septic emboli  Patient has no focal deficits on examination  During admission, she was placed on heparin drip and developed thrombocytopenia; concern for HIT and was switched to argatroban  She also met sepsis criteria  She developed NYDIA, now resolved  Hematology was consulted to offer recommendations regarding her anemia  Review of Systems:   Review of Systems   Constitutional: Positive for activity change and fatigue  Negative for chills and fever  HENT: Negative for ear pain and sore throat  Eyes: Negative for pain and visual disturbance  Respiratory: Negative for cough and shortness of breath  Cardiovascular: Negative for chest pain and palpitations  Gastrointestinal: Negative for abdominal pain and vomiting  Genitourinary: Negative for dysuria and hematuria  Musculoskeletal: Negative for arthralgias and back pain  BL LE pain   Skin: Positive for pallor  Negative for color change and rash  Neurological: Positive for weakness  Negative for seizures and syncope  Psychiatric/Behavioral: The patient is nervous/anxious      All other systems reviewed and are negative  Oncology History:   Cancer Staging  No matching staging information was found for the patient  Oncology History    No history exists  Past Medical History:   Past Medical History:   Diagnosis Date   • Anxiety    • Depression    • Diabetes ([facility] )    • Diabetes mellitus ([facility] )    • Esophageal reflux     2015 RESOLVED   • Hyperlipidemia    • Hypertension    • Low back pain     sciatica   • Pneumonia    • Stroke ([facility] ) 2015    small vessel, L frontal corona radiata   Rx asa 81, Lipitor 40, risk modification   • Vitamin D deficiency        Past Surgical History:   Procedure Laterality Date   • ARTERIOGRAM Right 2022    Procedure: -Right lower extremity angiogram -Right CFA balloon angioplasty with [id number]  [employer]  -Right CFA DCB with 6x40 Bard Lutonix -Right CFA atherectomy with Jetstream and distal embolization protection with 7mm Spider FX -Right SFA/Pop angioplasty with 4x40 Chololate balloon -Right SFA/Pop DCB with 5x150 Bard Lutonix -Right SFA stent with [employer] x2 -R   • COLONOSCOPY     • IR AORTAGRAM WITH RUN-OFF  10/31/2022       Family History   Problem Relation Age of Onset   • Diabetes Mother    • Lung cancer Mother    • Cancer Father    • Lung cancer Father    • Hypertension Brother    • Hypertension Family        Social History     Socioeconomic History   • Marital status: Single     Spouse name: None   • Number of children: None   • Years of education: None   • Highest education level: None   Occupational History   • None   Tobacco Use   • Smoking status: Former Smoker     Types: Cigarettes     Quit date:      Years since quittin 8   • Smokeless tobacco: Never Used   Vaping Use   • Vaping Use: Never used   Substance and Sexual Activity   • Alcohol use: Never     Comment: OCCASIONAL ALCOHOL USE IN ALL SCRIPTS   • Drug use: No   • Sexual activity: None   Other Topics Concern   • None   Social History Narrative   • None     Social Determinants of Health     Financial Resource Strain: Not on file   Food Insecurity: Not on file   Transportation Needs: Not on file   Physical Activity: Not on file   Stress: Not on file   Social Connections: Not on file   Intimate Partner Violence: Not on file   Housing Stability: Low Risk    • Unable to Pay for Housing in the Last Year: No   • Number of Places Lived in the Last Year: 1   • Unstable Housing in the Last Year: No         Current Facility-Administered Medications:   •  acetaminophen (TYLENOL) tablet 650 mg, 650 mg, Oral, Q6H PRN, Shae oHward MD  •  amLODIPine (NORVASC) tablet 10 mg, 10 mg, Oral, Daily, Shae Howard MD, 10 mg at 11/15/22 8470  •  argatroban infusion (premix), 0 1-3 mcg/kg/min, Intravenous, Titrated, Shae Howard MD, Stopped at 11/14/22 1455  •  atorvastatin (LIPITOR) tablet 40 mg, 40 mg, Oral, Daily With Marysol Shearer MD, 40 mg at 11/14/22 1725  •  cefTRIAXone (ROCEPHIN) 2,000 mg in dextrose 5 % 50 mL IVPB, 2,000 mg, Intravenous, Q24H, Shae Howard MD, Last Rate: 100 mL/hr at 11/15/22 0101, 2,000 mg at 11/15/22 0101  •  [MAR Hold] chlorhexidine (PERIDEX) 0 12 % oral rinse 15 mL, 15 mL, Swish & Spit, Once, Benitez Dallas,   •  clopidogrel (PLAVIX) tablet 75 mg, 75 mg, Oral, Daily, Shae Mccauley MD, 75 mg at 11/15/22 1917  •  doxazosin (CARDURA) tablet 2 mg, 2 mg, Oral, Daily, No Goldsmith MD, 2 mg at 11/15/22 0064  •  gabapentin (NEURONTIN) capsule 200 mg, 200 mg, Oral, BID, No Goldsmith MD, 200 mg at 11/15/22 2049  •  heparin (porcine) 2,000 Units, papaverine 60 mg in multi-electrolyte (ISOLYTE-S PH 7 4 equivalent) 500 mL irrigation, , Irrigation, Once, Renny Greenfield MD  •  insulin glargine (LANTUS) subcutaneous injection 15 Units 0 15 mL, 15 Units, Subcutaneous, HS, No Goldsmith MD, 15 Units at 11/14/22 2219  •  insulin lispro (HumaLOG) 100 units/mL subcutaneous injection 1-6 Units, 1-6 Units, Subcutaneous, TID AC **AND** Fingerstick Glucose (POCT), , , TID AC, Shae Mccauley MD  •  lactated ringers infusion, 125 mL/hr, Intravenous, Continuous, Erica Merlin Bowen, DO  •  naloxone (NARCAN) 0 04 mg/mL syringe 0 04 mg, 0 04 mg, Intravenous, Q1MIN PRN, Alize Coleman MD  •  nystatin (MYCOSTATIN) powder, , Topical, BID, Alize Coleman MD, Given at 11/15/22 4239  •  ondansetron (ZOFRAN) injection 4 mg, 4 mg, Intravenous, Q6H PRN, Alize Coleman MD, 4 mg at 11/08/22 7796  •  oxyCODONE (ROXICODONE) immediate release tablet 10 mg, 10 mg, Oral, Q4H PRN, Alize Coleman MD, 10 mg at 11/15/22 8150  •  pantoprazole (PROTONIX) EC tablet 40 mg, 40 mg, Oral, Early Morning, Alize Coleman MD, 40 mg at 11/13/22 0558  •  polyethylene glycol (MIRALAX) packet 17 g, 17 g, Oral, Daily PRN, Alize Coleman MD  •  QUEtiapine (SEROquel) tablet 25 mg, 25 mg, Oral, HS, Alize Coleman MD, 25 mg at 11/14/22 2219  •  senna (SENOKOT) tablet 8 6 mg, 1 tablet, Oral, HS PRN, Alize Coleman MD  •  sertraline (ZOLOFT) tablet 150 mg, 150 mg, Oral, Daily, Alize Coleman MD, 150 mg at 11/15/22 1878    Medications Prior to Admission   Medication   • ALPRAZolam (XANAX) 0 25 mg tablet   • amLODIPine (NORVASC) 10 mg tablet   • aspirin 81 mg chewable tablet   • atorvastatin (LIPITOR) 40 mg tablet   • clotrimazole-betamethasone (LOTRISONE) 1-0 05 % cream   • fluocinonide (LIDEX) 0 05 % ointment   • gabapentin (NEURONTIN) 100 mg capsule   • insulin aspart (NovoLOG FlexPen) 100 UNIT/ML injection pen   • insulin glargine (Lantus SoloStar) 100 units/mL injection pen   • lisinopril (ZESTRIL) 40 mg tablet   • metoprolol succinate (TOPROL-XL) 25 mg 24 hr tablet   • Multiple Vitamin (multivitamin) tablet • sertraline (ZOLOFT) 100 mg tablet   • Trulicity 6 28 FX/7 6TN SOPN   • Cholecalciferol (Vitamin D-3) 25 MCG (1000 UT) CAPS   • Continuous Blood Gluc  (FreeStyle Noé 14 Day [place]) LITO   • Continuous Blood Gluc Sensor (FreeStyle Noé 14 Day Sensor) MISC   • Doxylamine Succinate, Sleep, (UNISOM PO)   • glucose blood (True Metrix Blood Glucose Test) test strip   • Insulin Pen Needle (B-D UF III MINI PEN NEEDLES) 31G X 5 MM MISC   • Lancets 28G MISC   • pantoprazole (PROTONIX) 40 mg tablet   • potassium chloride (K-DUR,KLOR-CON) 10 mEq tablet       No Known Allergies      Physical Exam:     /67 (BP Location: Right arm)   Pulse 85   Temp 98 3 °F (36 8 °C) (Temporal)   Resp 18   Ht 5' 4" (1 626 m)   Wt 110 kg (242 lb 1 oz)   SpO2 95%   BMI 41 55 kg/m²     Physical Exam  Constitutional:       Appearance: She is obese  HENT:      Head: Normocephalic  Mouth/Throat:      Mouth: Mucous membranes are dry  Eyes:      Conjunctiva/sclera: Conjunctivae normal    Cardiovascular:      Rate and Rhythm: Normal rate and regular rhythm  Pulmonary:      Effort: Pulmonary effort is normal       Comments: On 2L of O2 via NC  Abdominal:      General: Bowel sounds are normal       Palpations: Abdomen is soft  Musculoskeletal:      Right lower leg: Edema present  Left lower leg: Edema present  Skin:     General: Skin is warm and dry  Findings: Bruising present  Neurological:      General: No focal deficit present  Mental Status: She is alert and oriented to person, place, and time  Motor: Weakness present     Psychiatric:         Mood and Affect: Mood normal            Recent Results (from the past 48 hour(s))   Fingerstick Glucose (POCT)    Collection Time: 11/13/22  4:38 PM   Result Value Ref Range    POC Glucose 104 65 - 140 mg/dl   APTT    Collection Time: 11/13/22  7:39 PM   Result Value Ref Range    PTT 98 (H) 23 - 37 seconds   APTT    Collection Time: 11/14/22  1:41 AM Result Value Ref Range    PTT 97 (H) 23 - 37 seconds   Procalcitonin, Next Day AM Collection    Collection Time: 11/14/22  6:19 AM   Result Value Ref Range    Procalcitonin 1 00 (H) <=0 25 ng/ml   Basic metabolic panel    Collection Time: 11/14/22  6:19 AM   Result Value Ref Range    Sodium 140 135 - 147 mmol/L    Potassium 3 2 (L) 3 5 - 5 3 mmol/L    Chloride 102 96 - 108 mmol/L    CO2 28 21 - 32 mmol/L    ANION GAP 10 4 - 13 mmol/L    BUN 12 5 - 25 mg/dL    Creatinine 1 11 0 60 - 1 30 mg/dL    Glucose 104 65 - 140 mg/dL    Calcium 8 0 (L) 8 3 - 10 1 mg/dL    eGFR 53 ml/min/1 73sq m   Lipid Panel with Direct LDL reflex    Collection Time: 11/14/22  6:19 AM   Result Value Ref Range    Cholesterol 102 See Comment mg/dL    Triglycerides 93 See Comment mg/dL    HDL, Direct 28 (L) >=50 mg/dL    LDL Calculated 55 0 - 100 mg/dL   APTT    Collection Time: 11/14/22  6:19 AM   Result Value Ref Range    PTT 94 (H) 23 - 37 seconds   CK (with reflex to MB)    Collection Time: 11/14/22  6:19 AM   Result Value Ref Range    Total CK 2,728 (H) 26 - 192 U/L   CKMB    Collection Time: 11/14/22  6:19 AM   Result Value Ref Range    CK-MB Index <1 0 0 0 - 2 5 %    CK-MB 12 0 (H) 0 0 - 5 0 ng/mL   Fingerstick Glucose (POCT)    Collection Time: 11/14/22  6:36 AM   Result Value Ref Range    POC Glucose 107 65 - 140 mg/dl   Echo follow up/limited w/ contrast if indicated    Collection Time: 11/14/22  9:52 AM   Result Value Ref Range    LA size 4 2 cm    Aortic valve mean velocity 10 80 m/s    Triscuspid Valve Regurgitation Peak Gradient 29 0 mmHg    Tricuspid valve peak regurgitation velocity 2 67 m/s    LVPWd 1 20 cm    TR Peak Toni 2 7 m/s    IVSd 9 94 cm    LV DIASTOLIC VOLUME (MOD BIPLANE) 2D 99 mL    LEFT VENTRICLE SYSTOLIC VOLUME (MOD BIPLANE) 2D 36 mL    Left ventricular stroke volume (2D) 63 00 mL    LVIDd 4 60 cm    IVS 1 3 cm    LVIDS 3 00 cm    FS 35 28 - 44 %    Ao root 2 70 cm    LVOT mn grad 3 0 mmHg    AV mean gradient 5 mmHg    AV LVOT peak gradient 6 mmHg    MV valve area p 1/2 method 3 86 cm2    E wave deceleration time 197 ms    LVOT peak toni 1 26 m/s    LVOT peak VTI 26 81 cm    Aortic valve peak velocity 1 6 m/s    Ao VTI 32 57 cm    AV peak gradient 10 mmHg    MV Peak E Toni 147 cm/s    MV Peak A Toni 1 m/s    MV stenosis pressure 1/2 time 57 ms    LVSV, 2D 63 mL    Dimensionless velociy index 0 79     LV EF 61    CBC and differential    Collection Time: 11/14/22 10:49 AM   Result Value Ref Range    WBC 16 57 (H) 4 31 - 10 16 Thousand/uL    RBC 2 52 (L) 3 81 - 5 12 Million/uL    Hemoglobin 7 0 (L) 11 5 - 15 4 g/dL    Hematocrit 21 9 (L) 34 8 - 46 1 %    MCV 87 82 - 98 fL    MCH 27 8 26 8 - 34 3 pg    MCHC 32 0 31 4 - 37 4 g/dL    RDW 15 7 (H) 11 6 - 15 1 %    MPV 10 0 8 9 - 12 7 fL    Platelets 696 025 - 883 Thousands/uL   Manual Differential(PHLEBS Do Not Order)    Collection Time: 11/14/22 10:49 AM   Result Value Ref Range    Segmented % 92 (H) 43 - 75 %    Lymphocytes % 4 (L) 14 - 44 %    Monocytes % 3 (L) 4 - 12 %    Eosinophils, % 1 0 - 6 %    Basophils % 0 0 - 1 %    Absolute Neutrophils 15 24 (H) 1 85 - 7 62 Thousand/uL    Lymphocytes Absolute 0 66 0 60 - 4 47 Thousand/uL    Monocytes Absolute 0 50 0 00 - 1 22 Thousand/uL    Eosinophils Absolute 0 17 0 00 - 0 40 Thousand/uL    Basophils Absolute 0 00 0 00 - 0 10 Thousand/uL    Total Counted      RBC Morphology Present     Hypochromia Present     Platelet Estimate Adequate Adequate   Fingerstick Glucose (POCT)    Collection Time: 11/14/22 12:03 PM   Result Value Ref Range    POC Glucose 146 (H) 65 - 140 mg/dl   ECG 12 lead    Collection Time: 11/14/22 12:51 PM   Result Value Ref Range    Ventricular Rate 81 BPM    Atrial Rate 81 BPM    AZ Interval 133 ms    QRSD Interval 79 ms    QT Interval 383 ms    QTC Interval 445 ms    P Axis 49 degrees    QRS Axis 44 degrees    T Wave Axis 67 degrees   APTT    Collection Time: 11/14/22 12:59 PM   Result Value Ref Range     (H) 23 - 37 seconds   APTT    Collection Time: 11/14/22  4:57 PM   Result Value Ref Range     (H) 23 - 37 seconds   Fingerstick Glucose (POCT)    Collection Time: 11/14/22  5:22 PM   Result Value Ref Range    POC Glucose 186 (H) 65 - 140 mg/dl   Fingerstick Glucose (POCT)    Collection Time: 11/14/22  9:57 PM   Result Value Ref Range    POC Glucose 204 (H) 65 - 140 mg/dl   APTT    Collection Time: 11/14/22 10:21 PM   Result Value Ref Range     (H) 23 - 37 seconds   APTT    Collection Time: 11/15/22  2:24 AM   Result Value Ref Range     (H) 23 - 37 seconds   Phosphorus    Collection Time: 11/15/22  5:59 AM   Result Value Ref Range    Phosphorus 3 9 2 3 - 4 1 mg/dL   Magnesium    Collection Time: 11/15/22  5:59 AM   Result Value Ref Range    Magnesium 1 8 1 6 - 2 6 mg/dL   Basic metabolic panel    Collection Time: 11/15/22  5:59 AM   Result Value Ref Range    Sodium 139 135 - 147 mmol/L    Potassium 3 5 3 5 - 5 3 mmol/L    Chloride 101 96 - 108 mmol/L    CO2 29 21 - 32 mmol/L    ANION GAP 9 4 - 13 mmol/L    BUN 16 5 - 25 mg/dL    Creatinine 0 95 0 60 - 1 30 mg/dL    Glucose 134 65 - 140 mg/dL    Calcium 7 5 (L) 8 3 - 10 1 mg/dL    eGFR 64 ml/min/1 73sq m   CBC and differential    Collection Time: 11/15/22  5:59 AM   Result Value Ref Range    WBC 15 62 (H) 4 31 - 10 16 Thousand/uL    RBC 2 36 (L) 3 81 - 5 12 Million/uL    Hemoglobin 6 4 (LL) 11 5 - 15 4 g/dL    Hematocrit 20 6 (L) 34 8 - 46 1 %    MCV 87 82 - 98 fL    MCH 27 1 26 8 - 34 3 pg    MCHC 31 1 (L) 31 4 - 37 4 g/dL    RDW 15 7 (H) 11 6 - 15 1 %    MPV 10 0 8 9 - 12 7 fL    Platelets 399 901 - 722 Thousands/uL    nRBC 0 /100 WBCs    Immature Grans Absolute 0 22 (H) 0 00 - 0 20 Thousand/uL    Basophils Absolute 0 04 0 00 - 0 10 Thousands/µL   Procalcitonin    Collection Time: 11/15/22  5:59 AM   Result Value Ref Range    Procalcitonin 0 93 (H) <=0 25 ng/ml   APTT    Collection Time: 11/15/22  5:59 AM   Result Value Ref Range     (H) 23 - 37 seconds   Retic Count    Collection Time: 11/15/22  5:59 AM   Result Value Ref Range    Retic Ct Abs 54,500 14,097 - 95,744    Retic Ct Pct 2 34 (H) 0 37 - 1 87 %   Fingerstick Glucose (POCT)    Collection Time: 11/15/22  7:35 AM   Result Value Ref Range    POC Glucose 132 65 - 140 mg/dl   Type and screen    Collection Time: 11/15/22  8:51 AM   Result Value Ref Range    ABO Grouping A     Rh Factor Negative     Antibody Screen Negative     Specimen Expiration Date [id number]    APTT    Collection Time: 11/15/22 10:50 AM   Result Value Ref Range    PTT 96 (H) 23 - 37 seconds   Fingerstick Glucose (POCT)    Collection Time: 11/15/22 10:51 AM   Result Value Ref Range    POC Glucose 163 (H) 65 - 140 mg/dl   Prepare Leukoreduced RBC: 1 Units    Collection Time: 11/15/22 11:45 AM   Result Value Ref Range    Unit Product Code Q3365D55     Unit Number [id number]     Unit ABO A     Unit RH NEG     Crossmatch Compatible     Unit Dispense Status Crossmatched     Unit Product Volume 350 ml       CTA head and neck w wo contrast    Result Date: 11/13/2022  Narrative: CTA NECK AND BRAIN WITH AND WITHOUT CONTRAST INDICATION: Stroke/TIA, determine embolic source stroke COMPARISON:   Same date CT TECHNIQUE:  Routine CT imaging of the Brain without contrast   Post contrast imaging was performed after administration of iodinated contrast through the neck and brain  Post contrast axial 0 625 mm images timed to opacify the arterial system  3D rendering was performed on an independent workstation  MIP reconstructions performed  Coronal reconstructions were performed of the noncontrast portion of the brain  Radiation dose length product (DLP) for this visit:  578 mGy-cm   This examination, like all CT scans performed in the [facility], was performed utilizing techniques to minimize radiation dose exposure, including the use of iterative reconstruction and automated exposure control     IV Contrast:  85 mL of iohexol (OMNIPAQUE)  IMAGE QUALITY:   Diagnostic FINDINGS: NONCONTRAST BRAIN PARENCHYMA:  Right parietal subacute ischemia  VENTRICLES AND EXTRA-AXIAL SPACES:  Normal for the patient's age  VISUALIZED ORBITS AND PARANASAL SINUSES:  Unremarkable  CERVICAL VASCULATURE AORTIC ARCH AND GREAT VESSELS:  Mild atherosclerotic disease of the arch, proximal great vessels and visualized subclavian vessels  No significant stenosis  RIGHT VERTEBRAL ARTERY CERVICAL SEGMENT:  Moderate stenosis at the origin  The vessel is normal in caliber throughout the neck  LEFT VERTEBRAL ARTERY CERVICAL SEGMENT:  Moderate stenosis at the origin  The vessel is normal in caliber throughout the neck  RIGHT EXTRACRANIAL CAROTID SEGMENT:  Bilateral common carotid retropharyngeal course with short segment moderate plaque stenosis  Normal bifurcation and cervical internal carotid artery  No stenosis or dissection  LEFT EXTRACRANIAL CAROTID SEGMENT:  Mild atherosclerotic disease of the distal common carotid artery and proximal cervical internal carotid artery without significant stenosis compared to the more distal ICA  NASCET criteria was used to determine the degree of internal carotid artery diameter stenosis  INTRACRANIAL VASCULATURE INTERNAL CAROTID ARTERIES:  Normal enhancement of the intracranial portions of the internal carotid arteries  Normal ophthalmic artery origins  Normal ICA terminus  ANTERIOR CIRCULATION:  Symmetric A1 segments and anterior cerebral arteries with normal enhancement  Normal anterior communicating artery  MIDDLE CEREBRAL ARTERY CIRCULATION:  M1 segment and middle cerebral artery branches demonstrate normal enhancement bilaterally  DISTAL VERTEBRAL ARTERIES:  Normal distal vertebral arteries  Posterior inferior cerebellar artery origins are normal  Normal vertebral basilar junction  BASILAR ARTERY:  Basilar artery is normal in caliber  Normal superior cerebellar arteries   POSTERIOR CEREBRAL ARTERIES: Both posterior cerebral arteries arises from the basilar tip  Both arteries demonstrate normal enhancement  Normal posterior communicating arteries  VENOUS STRUCTURES:  Normal  NON VASCULAR ANATOMY BONY STRUCTURES:  No acute osseous abnormality  SOFT TISSUES OF THE NECK:  Unremarkable  THORACIC INLET:  Small bilateral pleural effusions, partially visualized  Impression: Moderate bilateral vertebral artery origin plaque stenosis  Bilateral common carotid retropharyngeal course with short segment moderate plaque stenosis  No occlusion or dissection  Workstation performed: [id number]     XR chest portable    Result Date: 11/13/2022  Narrative: CHEST INDICATION:   sirs  COMPARISON:  Chest radiograph from November 10, 2022 EXAM PERFORMED/VIEWS:  XR CHEST PORTABLE FINDINGS: Cardiomediastinal silhouette appears unremarkable  Mild pulmonary edema  Bibasilar atelectasis  No pneumothorax  Trace layering effusions  Osseous structures appear within normal limits for patient age  Impression: Mild pulmonary edema and trace layering pleural effusions  No definite consolidation  If continued clinical concern, suggest follow-up frontal and lateral views for further evaluation  Workstation performed: [id number]     XR chest portable    Result Date: 11/10/2022  Narrative: CHEST INDICATION:   cough/wheezing  COMPARISON:  CXR 11/8/2022, abdomen CT 11/9/2022, chest CT 2/23/2018  EXAM PERFORMED/VIEWS:  XR CHEST PORTABLE FINDINGS: Cardiomediastinal silhouette appears unremarkable  Mild pulmonary venous congestion  Question trace right effusion  No pneumothorax  Osseous structures appear within normal limits for patient age  Impression: Mild pulmonary venous congestion  Question trace right effusion  Workstation performed: [id number]     XR chest portable    Result Date: 10/31/2022  Narrative: CHEST INDICATION:   new hypoxia, concern for CHF   COMPARISON:  Chest radiograph October 26, 2022 EXAM PERFORMED/VIEWS:  XR CHEST PORTABLE FINDINGS: Heart shadow is enlarged but unchanged from prior exam  Diffuse bilateral reticular opacities have increased since prior study  No definite pleural effusion  No pneumothorax  Osseous structures appear within normal limits for patient age  Impression: Pulmonary edema has increased since October 26, 2022  Workstation performed: [id number]     XR chest portable    Result Date: 10/27/2022  Narrative: CHEST INDICATION:   dyspnea  COMPARISON:  Chest radiograph dated 10/21/2022  EXAM PERFORMED/VIEWS:  XR CHEST PORTABLE FINDINGS: Cardiomediastinal silhouette appears unremarkable  Mild pulmonary vascular congestion  No pneumothorax or pleural effusion  Osseous structures appear within normal limits for patient age  Impression: Mild pulmonary vascular congestion  Workstation performed: [id number]     XR chest 1 view portable    Result Date: 10/22/2022  Narrative: CHEST INDICATION:   OREILLY  COMPARISON:  02/06/2020 EXAM PERFORMED/VIEWS:  XR CHEST PORTABLE 1 image FINDINGS: Cardiomediastinal silhouette appears unremarkable  The lungs are clear  No pneumothorax or pleural effusion  Osseous structures appear within normal limits for patient age  Impression: No acute cardiopulmonary disease  Workstation performed: [id number]     XR foot 3+ views RIGHT    Result Date: 10/22/2022  Narrative: RIGHT FOOT INDICATION:   necrotic R 5th digit  COMPARISON:  None VIEWS:  XR FOOT 3+ VW RIGHT Images: 3 FINDINGS: There is no acute fracture or dislocation  No focal areas of bony destruction  An inferior calcaneal spur is present  Spurring in the midfoot  No lytic or blastic osseous lesion  There are atherosclerotic calcifications  Soft tissue swelling about the 5th toe  Soft tissues are otherwise unremarkable  Impression: No acute osseous abnormality   Workstation performed: [id number]     CT head wo contrast    Result Date: 11/15/2022  Narrative: CT BRAIN - WITHOUT CONTRAST INDICATION:   Stroke, follow up Stroke re eval  COMPARISON:  CTA head and neck with and without contrast November 13, 2022  TECHNIQUE:  CT examination of the brain was performed  In addition to axial images, sagittal and coronal 2D reformatted images were created and submitted for interpretation  Radiation dose length product (DLP) for this visit:  1036 mGy-cm   This examination, like all CT scans performed in the [facility], was performed utilizing techniques to minimize radiation dose exposure, including the use of iterative reconstruction and automated exposure control  IMAGE QUALITY:  Diagnostic  FINDINGS: PARENCHYMA: Evolving acute infarct in right parietal-temporal-occipital lobes with questionable petechial cortical hemorrhage in right parietal lobe  No acute parenchymal hematoma  Decreased attenuation is noted in periventricular and subcortical white matter demonstrating an appearance that is statistically most likely to represent mild microangiopathic change  No intracranial mass, mass effect or midline shift  Arterial calcifications of carotid siphons  VENTRICLES AND EXTRA-AXIAL SPACES:  Normal for the patient's age  VISUALIZED ORBITS AND PARANASAL SINUSES:  Orbits are normal   Small right maxillary retention cyst  CALVARIUM AND EXTRACRANIAL SOFT TISSUES:  Normal      Impression: Evolving acute infarct in right parietal-temporal-occipital lobes with questionable petechial cortical hemorrhage in right parietal lobe  No new acute intracranial abnormality  The study was marked in [facility] [facility] for immediate notification  Workstation performed: [id number]     CT head wo contrast    Result Date: 11/13/2022  Narrative: CT BRAIN - WITHOUT CONTRAST INDICATION:   Mental status change, unknown cause change in mental status, on full anticoagulation  COMPARISON:  2/25/2018 TECHNIQUE:  CT examination of the brain was performed  In addition to axial images, sagittal and coronal 2D reformatted images were created and submitted for interpretation  Radiation dose length product (DLP) for this visit:  1838 mGy-cm   This examination, like all CT scans performed in the [facility], was performed utilizing techniques to minimize radiation dose exposure, including the use of iterative reconstruction and automated exposure control  IMAGE QUALITY:  Motion artifact limits portions of the study  Portions of the study were repeated  Some diagnostic information is obtained  FINDINGS: PARENCHYMA:  There is a moderate region of wedge-shaped hypodensity extending from the periventricular margin to the cortical surface predominantly in the right parietal lobe indicative of a moderate, acute/recent infarction  There is no large hemorrhage  There is local sulcal effacement and mass effect without subfalcine herniation  Atherosclerotic calcifications of the cavernous segment of the internal carotid artery are mild  VENTRICLES AND EXTRA-AXIAL SPACES:  Mild effacement of the occipital horn of the right lateral ventricle secondary to adjacent cytotoxic edema in the right parietal lobe  VISUALIZED ORBITS AND PARANASAL SINUSES:  Unremarkable  CALVARIUM AND EXTRACRANIAL SOFT TISSUES:  Normal      Impression: 1  Moderate-sized acute/recent infarction centered on the right parietal lobe with local mass effect  No acute intracranial hemorrhage  Workstation performed: [id number]     CTA ABDOMEN W RUN OFF W WO CONTRAST    Result Date: 11/9/2022  Narrative: CT ANGIOGRAM OF THE AORTA AND LOWER EXTREMITIES WITH IV CONTRAST INDICATION:  Chronic limb ischemia with tissue loss in both legs  Previous endovascular interventions   COMPARISON: To CTA studies and 2 arteriograms since October 27, 2022 TECHNIQUE:  CT angiogram examination of the abdomen, pelvis, and lower extremities was performed according to standard protocol with intravenous contrast   This examination, like all CT scans performed in the [facility], was performed utilizing techniques to minimize radiation dose exposure, including the use of iterative reconstruction and automated exposure control  3D reconstructions were performed an independent workstation, and are supplied for review  Rad dose 3041 mGy-cm IV Contrast:  145 mL of iohexol (OMNIPAQUE)  FINDINGS: VASCULAR STRUCTURES:   Proximal right common iliac artery stent does not look opacified on CTA  No other significant common or external iliac lesion on the right side  Right common femoral arteries mildly calcified but without focal stenosis  Common femoral bifurcation filling defect is no longer present  No clear evidence of residual dissection  Right SFA is diffusely small with multiple mild stenoses proximally  Long stent within mid and distal SFA seems patent but unable to determine any residual stenosis due to stent artifact and very small luminal caliber  Popliteal artery is patent with multiple mild stenoses throughout and diffusely small caliber  No focal flow-limiting stenosis is identified  Anterior tibial and posterior tibial arteries are intact to the ankle  Posterior tibial artery occludes proximal to the calcaneus  Dorsalis pedis artery is severely stenotic distally and possibly segmentally occluded  Peroneal artery is patent but communicating arteries are not opacified  In the left leg, there is a calcified stenosis at the origin the left common iliac difficult to quantify  It is not well seen on prior arteriograms  Based on reconstructions the lesion measures 30-40% in severity  No other common or external iliac lesion  No significant left common femoral stenosis  Left SFA is diffusely small and diseased  Recent proximal and distal stenting  Proximal left SFA stent appears patent  Distal stent is occluded  Popliteal artery is severely stenotic distal to the stent  Below-knee popliteal artery is diffusely stenotic with severe stenosis at origin of the anterior tibial artery    Anterior tibial artery is patent  Posterior tibial artery is only segmentally seen proximally and occluded distally  Peroneal artery is patent  Dorsalis pedis arteries patent  OTHER FINDINGS ABDOMEN LOWER CHEST:  Small bilateral pleural effusions  LIVER/BILIARY TREE:  Unremarkable  GALLBLADDER:  No calcified gallstones  No pericholecystic inflammatory change  SPLEEN:  Asymmetric hyperperfusion into the upper pole of uncertain significance  Not seen previously  Maybe related to phase of contrast   Splenic artery is patent  No suggestion of embolic occlusion  PANCREAS:  Unremarkable  ADRENAL GLANDS: Unremarkable  KIDNEYS/URETERS:  No solid renal mass  No hydronephrosis  No urinary tract calculi  PELVIS REPRODUCTIVE ORGANS:  Unremarkable for patient's age  URINARY BLADDER:  Jacinto in place  Nondistended bladder  ADDITIONAL ABDOMINAL AND PELVIC STRUCTURES STOMACH AND BOWEL:  Unremarkable  ABDOMINOPELVIC CAVITY:   New small volume ascites ABDOMINAL WALL/INGUINAL REGIONS:  Diffuse subcutaneous edema OSSEOUS STRUCTURES:  No acute fracture or destructive osseous lesion     Diffuse subcutaneous edema in both legs  Impression: 1  Suspect interval occlusion of recently placed proximal right common iliac artery stent 2  No residual filling defect or dissection involving right common femoral artery  Diffusely small diseased right SFA and popliteal artery without focal flow-limiting stenosis identified  Patent recently placed SFA stent  3   Intact anterior tibial artery with stenotic and/or occluded dorsalis pedis artery  Posterior tibial artery occludes at the ankle  Patent peroneal artery without opacification of anterior and posterior communicating arteries  4   Near circumferentially calcified stenosis of proximal left common iliac artery    The lesion measures 30-40% in severity by reconstructions which are compromised by artifact and software limitations and therefore difficult to exclude a focal flow-limiting stenosis within this segment  5   No significant left common femoral stenosis  SFA is diffusely diseased  Newly placed proximal stent is patent but distal SFA stent is occluded  Popliteal artery is diffusely stenotic  6   Left popliteal artery is diffusely stenotic which seems new from the prior arteriogram and may be related to acute stent occlusion  Left anterior tibial artery is intact  Posterior tibial artery occludes at the calcaneus  7   Small bilateral pleural effusions 8  Small volume ascites new from prior CT with diffuse subcutaneous edema Workstation performed: [id number]     CTA ABDOMEN W RUN OFF W WO CONTRAST    Result Date: 11/2/2022  Narrative: CT ANGIOGRAM OF THE AORTA AND LOWER EXTREMITIES WITH IV CONTRAST INDICATION:  Recent left lower extremity intervention with new decrease in ankle-brachial indices in the right lower extremity  COMPARISON: CTA, dated 10/27/2022  Fluoroscopy, dated 10/31/2022  TECHNIQUE:  CT angiogram examination of the abdomen, pelvis, and lower extremities was performed according to standard protocol with intravenous contrast   This examination, like all CT scans performed in the [facility], was performed utilizing techniques to minimize radiation dose exposure, including the use of iterative reconstruction and automated exposure control  Rad dose 2576 mGy-cm IV Contrast:  130 mL of iohexol (OMNIPAQUE)  FINDINGS: VASCULAR STRUCTURES:   ABDOMINAL VASCULAR STRUCTURES:   AORTA: The abdominal aorta is normal in caliber  There are moderate atherosclerotic calcifications  CELIAC ARTERY: The origin is normal in caliber  Branching anatomy is conventional   There is no atherosclerotic disease  SUPERIOR MESENTERIC ARTERY: Normal caliber, without atherosclerotic calcifications  INFERIOR MESENTERIC ARTERY: The ostia and visualized branches are normal  RIGHT RENAL ARTERY: The single renal artery is normal in caliber   LEFT RENAL ARTERY: The single renal artery has mild atherosclerotic calcifications at the origin  LEFT LOWER EXTREMITY: LEFT COMMON ILIAC ARTERY: Mild calcific atherosclerotic disease  The vessel is normal in caliber  LEFT INTERNAL ILIAC ARTERY: Mild atherosclerotic disease  Visualized branches are normal in caliber  LEFT EXTERNAL ILIAC ARTERY: Normal in caliber, without atherosclerotic disease  LEFT COMMON FEMORAL ARTERY: Mild calcific atherosclerotic disease  The vessel is normal in caliber  LEFT PROFUNDA FEMORIS: Mild calcific atherosclerotic disease  The vessel is normal in caliber  LEFT SUPERFICIAL FEMORAL ARTERY: Interval stenting and lithotripsy of the proximal and distal left superficial femoral artery  Peak hardening artifact limits evaluation of the intraluminal stent  Remainder of the superficial femoral artery is normal  LEFT POPLITEAL ARTERY: Moderate calcific atherosclerotic disease  The vessel is normal in caliber  LEFT TIBIOPERONEAL TRUNK: Mild calcific atherosclerotic disease  The vessel is normal in caliber  LEFT ANTERIOR TIBIAL ARTERY: Mild calcific atherosclerotic disease  The vessel is normal in caliber  LEFT PERONEAL ARTERY: Mild calcific atherosclerotic disease  The vessel is normal in caliber  LEFT POSTERIOR TIBIAL ARTERY: Mild calcific atherosclerotic disease  The vessel is normal in caliber  LEFT FOOT: Visualized branches are normal  RIGHT LOWER EXTREMITY: RIGHT COMMON ILIAC ARTERY: Interval stenting of the right common iliac artery  Beam hardening artifact limits evaluation of the intraluminal stent  RIGHT INTERNAL ILIAC ARTERY: Mild atherosclerotic disease  Visualized branches are normal in caliber  RIGHT EXTERNAL ILIAC ARTERY: Normal in caliber, without atherosclerotic disease  RIGHT COMMON FEMORAL ARTERY: Interval development of a probable flow-limiting dissection flap at the prior arteriotomy site  Moderate calcific atherosclerotic disease  The vessel is normal in caliber  Atherosclerotic disease   RIGHT PROFUNDA FEMORIS: Normal in caliber, without atherosclerotic disease  RIGHT SUPERFICIAL FEMORAL ARTERY: Focal severe atherosclerotic narrowing at the adductor canal   Otherwise moderate atherosclerotic disease throughout the remainder of the vessel  RIGHT POPLITEAL ARTERY: Moderate calcific atherosclerotic disease  The vessel is normal in caliber  RIGHT TIBIOPERONEAL TRUNK: Mild calcific atherosclerotic disease  The vessel is normal in caliber  RIGHT ANTERIOR TIBIAL ARTERY: Mild calcific atherosclerotic disease  The vessel is normal in caliber  RIGHT PERONEAL ARTERY: Mild calcific atherosclerotic disease  The vessel is normal in caliber  RIGHT POSTERIOR TIBIAL ARTERY: Mild calcific atherosclerotic disease  The vessel is normal in caliber  RIGHT FOOT: Visualized branches are normal  VENOUS: The iliocaval system is normal in caliber, without collateralization  OTHER FINDINGS ABDOMEN LIVER/BILIARY TREE:  Unremarkable  GALLBLADDER:  No calcified gallstones  No pericholecystic inflammatory change  SPLEEN:  Unremarkable  Normal size  PANCREAS:  Unremarkable  ADRENAL GLANDS: Punctate calcification in the lateral christine of the left adrenal gland, benign  Right adrenal gland is normal  KIDNEYS/URETERS:  No solid renal mass  No hydronephrosis  No urinary tract calculi  PELVIS REPRODUCTIVE ORGANS:  Unremarkable for patient's age  URINARY BLADDER:  Small amount of air in the urinary bladder likely reflects prior instrumentation  ADDITIONAL ABDOMINAL AND PELVIC STRUCTURES STOMACH AND BOWEL:  Large bowel is normal   Small bowel is normal   Stomach is normal  ABDOMINOPELVIC CAVITY:   No pathologically enlarged mesenteric or retroperitoneal lymph nodes  No ascites or free intraperitoneal air  ABDOMINAL WALL/INGUINAL REGIONS:  Diffuse anasarca  OSSEOUS STRUCTURES:  No acute fracture or destructive osseous lesion  Impression: Vascular: Left lower extremity: Interval stenting and lithotripsy of the left superficial femoral artery   Right lower extremity: 1  Interval development of a probable flow-limiting dissection flap at the prior arteriotomy site in the right common femoral artery superimposed upon moderate to severe atherosclerotic disease  2   Focal severe atherosclerotic narrowing at the adductor canal with additional moderate atherosclerotic narrowing throughout the remainder of the right superficial femoral artery  The study was marked in [employer] for significant notification  Workstation performed: [id number]     CTA ABDOMEN W RUN OFF W WO CONTRAST    Result Date: 10/28/2022  Narrative: CT ANGIOGRAM OF THE AORTA AND LOWER EXTREMITIES WITH IV CONTRAST INDICATION:  Aortoiliac disease  Nonhealing left heel wound  COMPARISON: CT of the left lower extremity with contrast on 10/21/2022  CT of the abdomen pelvis with contrast on 2/20/2022  TECHNIQUE:  CT angiogram examination of the abdomen, pelvis, and lower extremities was performed according to standard protocol with intravenous contrast   This examination, like all CT scans performed in the [facility], was performed utilizing techniques to minimize radiation dose exposure, including the use of iterative reconstruction and automated exposure control  3D reconstructions were performed an independent workstation, and are supplied for review  Rad dose 2492 mGy-cm IV Contrast:  130 mL of iohexol (OMNIPAQUE)  FINDINGS: VASCULAR STRUCTURES:   Visualized thoracoabdominal aorta is patent  The infrarenal abdominal demonstrates mild stenosis, see series 2 image 48 where atherosclerotic disease encroaches upon the lumen, in this region the aortic diameter measures 8 mm  The celiac artery, SMA, AL and bilateral renal arteries are patent  Accessory renal arteries are present bilaterally  RIGHT: Common iliac artery: 50% stenosis proximally Internal iliac artery and branches: Patent  External iliac artery: Patent  Common femoral artery: 50% stenosis   Profunda femoral artery and branches: Patent  Superficial femoral artery: 5075% stenosis of the proximal several centimeters  The mid to distal SFA demonstrates segmental regions of up to 75% stenosis  Popliteal artery: Scattered stenoses of up to 50-75%  Patent three-vessel runoff extending into the right foot  LEFT: Common iliac artery: 50% stenosis proximally  Internal iliac artery and branches: Patent  External iliac artery: Patent  Profunda femoral artery and branches: Patent  Common femoral artery: No significant stenosis  Superficial femoral artery: The proximal 3rd of the vessel demonstrates up to 75% stenosis  The distal SFA demonstrates up to 75% stenosis as well  Popliteal artery: P2 segment greater than 75% stenosis  Patent three-vessel runoff extending into the left foot  OTHER FINDINGS ABDOMEN LOWER CHEST:  Not visualized  LIVER/BILIARY TREE: Partially visualized  Unremarkable  GALLBLADDER:  No calcified gallstones  No pericholecystic inflammatory change  SPLEEN:  Partially visualized  Unremarkable  Normal size  PANCREAS:  Unremarkable  ADRENAL GLANDS: Unchanged small left lateral limb calcification, unchanged  Right adrenal gland unremarkable  KIDNEYS/URETERS:  No solid renal mass  No hydronephrosis  No urinary tract calculi  Right lateral lower pole cyst  PELVIS REPRODUCTIVE ORGANS:  Unremarkable for patient's age  IUD  URINARY BLADDER:  Antidependent air, correlate for recent instrumentation ADDITIONAL ABDOMINAL AND PELVIC STRUCTURES STOMACH AND BOWEL:  Unremarkable  ABDOMINOPELVIC CAVITY:   No pathologically enlarged mesenteric or retroperitoneal lymph nodes  No ascites or free intraperitoneal air  ABDOMINAL WALL/INGUINAL REGIONS:  Mild anasarca  OSSEOUS STRUCTURES:  No acute fracture or destructive osseous lesion  Impression: 1  Moderate stenosis of the mid infrarenal abdominal aorta secondary to calcific atherosclerotic disease  2  LEFT: Up to 50% stenosis of the proximal common iliac artery    The proximal and distal 3rd of the superficial femoral artery demonstrates 50-75% stenosis  Popliteal artery demonstrates greater than 75% stenosis in the P2 segment  Patent three-vessel runoff extending into the left foot  3  RIGHT: Proximal common iliac artery stenosis of up to 50%  50% stenosis of the common femoral artery  Superficial femoral artery demonstrates up to 50% stenosis within the proximal several centimeters, 50-75% stenosis throughout the distal 3rd of the vessel  Popliteal artery demonstrates scattered stenoses of 50-75%  Patent three-vessel runoff extending into the right foot  Workstation performed: [id number]     MRI ankle/heel left  wo contrast    Result Date: 10/27/2022  Narrative: MRI ANKLE/HEEL LEFT WO CONTRAST INDICATION:   left heel eschar, unknown depth, rule out osteomyelitis  COMPARISON:  Left foot and ankle CT from 10/21/2022  TECHNIQUE:   MR sequences were obtained of the ankle including: Localizers, coronal STIR, coronal T1, axial T2 fat sat, axial PD, sagittal T2 fat sat, sagittal T1 sequences were obtained  Gadolinium was not used  FINDINGS: Exam moderately limited due to patient motion  Subcutaneous Tissues: There is a large plantar lateral heel ulcer, without underlying soft tissue abscess (series 5 image 5-15 ) Joint Effusion: None  TENDONS: Achilles tendon: Normal  Peroneus brevis and longus: Normal  Posterior tibialis, flexor digitorum longus, flexor hallucis longus: Normal  Anterior tibialis, extensor digitorum longus, extensor hallucis longus:  Normal  LIGAMENTS: Lateral collateral ligament complex:  Normal  Distal tibiofibular syndesmosis:  Normal  Medial collateral ligament complex:  Normal  Plantar Fascia:  Normal  Articular Surfaces:  Normal  Sinus Tarsi:  Normal  Tarsal Tunnel: Unremarkable   Bones:  Associated with the plantar lateral heel ulcer, there is only very minimal reactive marrow edema in the calcaneal tubercle evident on T2-weighted sequences (series 3 image 2, series 7 image 8 ) No confluent T1-weighted marrow abnormality or cortical destruction to suggest osteomyelitis  Musculature:  Normal      Impression: Exam moderately limited due to patient motion  There is a large plantar lateral heel ulcer, without underlying soft tissue abscess (series 5 image 5-15 ) Associated with this ulcer, there is only very minimal reactive marrow edema in the calcaneal tubercle evident on T2-weighted sequences (series 3 image 2, series 7 image 8 ) No confluent T1-weighted marrow abnormality or cortical destruction to suggest osteomyelitis  Workstation performed: [id number]     VAS lower limb arterial duplex, complete bilateral    Result Date: 11/9/2022  Narrative:  [facility] REPORT CLINICAL: Indications:  Evaluation s/p intervention  Bilateral lower extremity ulcerations  Operative History: 2022-11-07 Right CFA arterectomy w/  PTA 2022-11-07 Right Popliteal PTA and Posterior tibial PTA 2022-11-07 Right SFA PTA/ stent placement 2022-10-31 Abdominal aortogram 2022-10-31 Right ALLISON Stent 2022-10-31 Right PTA stent 2022-10-31 Left SFA shockwave angioplasty Risk Factors The patient has history of Obesity, HTN, Diabetes, CVA, Hyperlipidemia and previous smoking   Clinical Right Pressure:  188/ mm Hg, Left Pressure: IV  FINDINGS:  Segment                Right            Left                                          PSV (cm/s)  EDV  Impression  PSV (cm/s)  EDV  Common Femoral Artery          81   17                     145   20  Prox Profunda                 174   16                     230   12  Prox SFA                      143   14                      89    8  Mid SFA                        90    9  Occluded             0    0  Dist SFA                       70    8  Occluded             0    0  Proximal Pop                   41   11                      27   11  Distal Pop                     50    5                      20    7  Tibioperoneal                  40                           39 Dist Post Tibial                8    0                       0    0  Dist  Ant  Tibial              21    0                       8    3     CONCLUSION: Impression: RIGHT LOWER LIMB: Monophasic waveforms in the common femoral artery suggesting more proximal disease with diffuse disease throughout the remaining portions of the femoral-popliteal arteries without significant focal stenosis  Findings suggests tibioperoneal disease  Ankle/Brachial index: unobtainable secondary to indistinguishable Doppler signal, previous study 0 51  PVR tracing at the ankle is dampened  The PVR tracing at the metatarsal level and PPG waveform of the great toe are flat lined, suggesting poor distal perfusion, therefore, pressures are unobtainable  LEFT LOWER LIMB: An occlusion of the mid superficial femoral artery with minimal distal reconstitution  Findings suggests tibioperoneal disease  Ankle/Brachial index: unobtainable secondary to indistinguishable Doppler signal, previous study 0 74  The PVR tracings at the ankle and metatarsal levels are flat lined with the PPG waveform of the great toe is flat lined, suggesting poor distal perfusion, therefore, pressures are unobtainable  In comparison to the studies dated 10/24/2022 and 11/1/2022, there is progression in the disease process bilaterally  SIGNATURE: Electronically Signed by: César Flores on 2022-11-09 12:42:16 PM    VAS lower limb arterial duplex, complete bilateral    Result Date: 10/24/2022  Narrative:  [facility] REPORT CLINICAL: Indications:  Left heel ulceration  Risk Factors: Obesity, HTN, Diabetes, CVA, Hyperlipidemia and previous smoking  Clinical Right Pressure:  158/ mm Hg, Left Pressure:  168/ mm Hg    FINDINGS:  Segment                Right       Left                                          PSV (cm/s)  PSV (cm/s)  Common Femoral Artery          72         103  Prox Profunda                  92         164  Prox SFA                       86 45  Mid SFA                        93          60  Dist SFA                       57          97  Proximal Pop                   83          63  Distal Pop                    111          79  Tibioperoneal                 100          96  Dist Post Tibial               38           7  Dist  Ant  Tibial              36          46     CONCLUSION: Impression: RIGHT LOWER LIMB: Monophasic waveforms in the common femoral artery suggesting more proximal disease  Diffuse atherosclerotic disease throughout the femoral-popliteal arteries without focal stenosis  Findings suggests tibioperoneal disease  Ankle/Brachial index:  0 80, moderate claudication range  PVR/ PPG tracings are dampened  Metatarsal pressure of 64 mm Hg Great toe pressure of 61 mm Hg, above healing threshold for a diabetic  LEFT LOWER LIMB: Diffuse atherosclerotic disease throughout the femoral-popliteal arteries without focal stenosis  Findings suggests tibioperoneal disease  Ankle/Brachial index:  0 63, severe claudication range  PVR/ PPG tracings are dampened  Metatarsal pressure of 21 mm Hg Great toe pressure of 35 mm Hg, below healing threshold for a diabetic  SIGNATURE: Electronically Signed by: Anton Pena on 2022-10-24 02:00:50 PM    VAS lower limb arterial duplex, limited, unilateral    Result Date: 11/2/2022  Narrative:  [facility]: Indications:  Evaluated the right CFA and ilaic due to SHARIF decrease after angio/ stent  Operative History: 2022-10-31 Abdominal aortogram 2022-10-31 Right ALLISON Stent 2022-10-31 Right PTA stent 2022-10-31 Left SFA shockwave angioplasty Risk Factors The patient has history of Obesity, HTN, Diabetes (IDDM), Hyperlipidemia and previous smoking (quit >10yrs ago)    FINDINGS:  Right                  PSV (cm/s)  Dist EIA                      682  Common Femoral Artery         825  Prox SFA                      391     CONCLUSION:   Impression: There is a high grade stenosis in the right common femoral artery / proximal femoral artery  SIGNATURE: Electronically Signed by: Irene Soto on 2022-11-02 04:58:20 PM    VAS SHARIF & waveform analysis, multiple levels    Result Date: 11/1/2022  Narrative:  [facility]PORT [facility]: Indications:  SHARIF follow post-op aortogram, LLE run off, PTA stent  Operative History: 2022-10-31 Abdominal aortogram 2022-10-31 Left ALLISON Stent 2022-10-31 Left PTA stent 2022-10-31 Left SFA shockwave angioplasty Risk Factors The patient has history of Obesity, HTN, Diabetes (IDDM), Hyperlipidemia and previous smoking (quit >10yrs ago)  Clinical Right Pressure:  138/ mm Hg, Left Pressure:  158/ mm Hg  FINDINGS:  Segment       Ri  Left                         P    P  Peroneal      76   91  Post  Tibial  81  117  Ankle         81  117  Metatarsal    45   85  Great Toe     50   37     CONCLUSION:  Impression RIGHT LOWER LIMB Ankle/Brachial Index:0 51 wiyh in severe limits (prior 0 63) PPG/PVR Tracings are dampened  Metatarsal Pressure 45 mmHg Great Toe Pressure: 50 mmHg, within the healing range  LEFT LOWER LIMB Ankle/Brachial Index: 0 74 within moderate limits (prior 0 63) PPG/PVR Tracings are dampened  Metatarsal Pressure 85 mmHg Great Toe Pressure: 37 mmHg, below the healing range  SIGNATURE: Electronically Signed by: Irene Soto on 2022-11-01 03:41:46 PM    VAS lower limb vein mapping bypass graft    Result Date: 11/3/2022  Narrative:  [facility]PORT [facility]: Indications: Patient presents for a general health evaluation secondary to future open heart surgery  Patient is asymptomatic at this time  Operative History: 2022-10-31 Abdominal aortogram 2022-10-31 Right ALLISON Stent 2022-10-31 Right PTA stent 2022-10-31 Left SFA shockwave angioplasty Risk Factors The patient has history of Obesity, HTN, Diabetes (IDDM), Hyperlipidemia and previous smoking (quit >10yrs ago)    FINDINGS:  Segment         Right        Left Diameter AP  Diameter AP  GSV Inguinal            9 8          6 1  GSV Prox Thigh          3 5          4 9  GSV Mid Thigh           3 2          3 0  GSV Dist Thigh          3 0          4 7  GSV Knee                2 9          3 3  GSV Prox Calf           3 4          3 3  GSV Mid Calf            3 4          3 7  GSV Dist Calf           3 5          3 3  SSV Mid Calf            2 5          2 0  SSV Knee                1 9          2 1  SSV Ankle               2 5          2 4     CONCLUSION:  Impression: RIGHT LOWER LIMB: The great saphenous vein is patent  from the groin to the ankle  The vein measures >2mm in caliber from the groin to the distal calf  LEFT LOWER LIMB: The great saphenous vein is patent  from the groin to the ankle  The vein measures >2mm in caliber from the groin to the distal calf  SIGNATURE: Electronically Signed by: Sandrita Bethea on 2022-11-03 09:45:42 AM    IR aortagram with run-off    Result Date: 11/1/2022  Narrative: PROCEDURE: Ultrasound-guided access of the right common femoral artery  Right common femoral artery sheath arteriogram  Aortogram  Left lower extremity runoff arteriogram  Shockwave balloon lithotripsy of proximal SFA  Proximal SFA Rosalinda stent placement  Shockwave balloon lithotripsy of the distal SFA  Distal SFA Rosalinda stent placement  Postintervention SFA and lower leg arteriograms  Right common iliac artery arteriogram  Right common iliac artery Express stent placement  Post stent placement RCIA arteriogram  Successful right common femoral artery closure with a ProGlide device  STAFF: KELLY Goldsmith  CONTRAST: 180 mL Visapaque FLUOROSCOPY TIME: 34 8 minutes  NUMBER OF IMAGES: Multiple COMPLICATIONS: None  MEDICATIONS: Local lidocaine  Heparin: 6000 units Moderate sedation with fentanyl and Versed was monitored by radiology nursing staff  Monitoring of conscious sedation totaled 180 minutes  INDICATION: Nonhealing left foot /heel wounds   CTA and arterial duplex ultrasound with evidence of significant peripheral vascular disease  PROCEDURE: The patient was placed supine on the procedure table  The right groin was prepped and draped in a sterile manner  Timeout was performed  Ultrasound-guided access of the right common femoral artery was obtained with a micropuncture needle  The access was upsized and a 5 [place] sheath was placed  Access arteriogram was used to confirm that the sheath was not occlusive  Using a SOS flush catheter,  access to the contralateral SFA was obtained with a EmpowrNet wire, over which a 6 [place] by 65 cm destination sheath was placed within the left distal external iliac artery  A limited arteriogram was performed through the sheath at the aortic bifurcation, to confirm that the sheath was not occlusive within the known right common iliac artery proximal lesion  Left lower leg runoff arteriogram was performed, demonstrating a proximal SFA focal severe stenosis secondary to eccentric calcific atherosclerotic disease  A similar lesion was identified near the region of the abductor canal  The remainder of the SFA, popliteal artery are patent without significant stenosis  2 vessel runoff via the anterior tibial and peroneal arteries  A 0 014 wire was then advanced across the proximal SFA lesion  Shockwave balloon lithotripsy was performed for several rounds with a 5 mm x 6 cm Shockwave balloon  Postintervention arteriogram revealed no significant change within the focal eccentric plaque  A 6 mm x 6 cm Rosalinda stent was placed across this region, post dilated with a 5 mm  balloon  At completion no significant residual stenosis  The wire was then advanced across the distal SFA lesion  Shockwave balloon lithotripsy was performed for several rounds as above  This resulted in irregular dissection proximal to the lesion  The dissection as well as residual stenosis was stented with 6mm x 10cm Rosalinda stent postdilated with a 5 mm  balloon  Postintervention arteriogram with no significant residual stenosis  Completion left lower leg and foot arteriograms were performed, demonstrating preserved 2 vessel runoff as described above, with qualitatively increased rate of flow to the foot  The sheath was then retracted to the proximal right common iliac artery  Arteriogram was performed demonstrating up to 75% stenosis secondary to a focal region of eccentric calcific plaque  This was stented with a 7 mm x 17mm Express Stent  Post intervention arteriogram without residual stenosis  Successful access closure with a Proglide device  FINDINGS: 1  Ultrasound demonstrated the right common femoral artery to the patent  2  Left lower leg runoff arteriogram demonstrated a proximal SFA focal severe stenosis secondary to eccentric calcific atherosclerotic disease  A similar lesion was identified near the region of the abductor canal  The remainder of the SFA, popliteal artery are patent without significant stenosis  2 vessel runoff via the anterior tibial and peroneal arteries  Successful SFA shockwave lithotripsy and Rosalinda stent placement x 2 as described above  3  Mid right common iliac artery severe focal stenosis secondary to eccentric calcific plaque  Successful Express stent placement as described above  4  Preserved two vessel runoff on the left via the anterior tibial and peroneal arteries with qualitatively increased rate of flow compared to the preintervention arteriogram      Impression: Left lower leg arteriogram with proximal and distal SFA shockwave balloon lithotripsy and stent placement, as well as right common iliac artery stent placement  PLAN: Follow-up arterial duplex  Patient became short of breath after the procedure, with increased O2 requirement  Etiology most likely volume overload secondary to renal prep as well as contrast and fluid was given during the procedure where she was lying flat  Dr Humberto Saleh from the primary team made aware   Chest x-ray to be obtained upon arrival to the floor  Workstation performed: [id number]     XR chest portable ICU    Result Date: 11/9/2022  Narrative: CHEST INDICATION:   Increased o2 requirement  COMPARISON:  10/31/2022 EXAM PERFORMED/VIEWS:  XR CHEST PORTABLE ICU FINDINGS:  Patient rotation limits evaluation of the cardiomediastinal structures  Grossly stable cardiomediastinal structures  Central pulmonary vascular congestion with perihilar and basilar interstitial edema  No large effusions  No pneumothorax  No acute osseous abnormality  Stable bony structures  Impression: Moderate pulmonary edema, similar to the prior examination  No large effusions  Workstation performed: [id number]     CT lower extremity w contrast left    Result Date: 10/22/2022  Narrative: CT left lower extremity with IV contrast INDICATION: L foot necrosis  COMPARISON: None TECHNIQUE: CT examination of the above was performed  This examination, like all CT scans performed in the [facility], was performed utilizing techniques to minimize radiation dose exposure, including the use of iterative reconstruction  and automated exposure control software  Sagittal and coronal two dimensional reconstructed images were also submitted for interpretation  IV Contrast: 100 mL of iohexol (OMNIPAQUE) Rad dose  592 mGy-cm FINDINGS: OSSEOUS STRUCTURES:  No acute fracture, dislocation or destructive osseous lesion  Mild plantar calcaneal spurring  Mild degenerative changes of the ankle joint VISUALIZED MUSCULATURE:  Unremarkable  SOFT TISSUES:  Atherosclerosis  Moderate superficial soft tissue edema in the foot, ankle, and leg, superimposed cellulitis considered in the appropriate clinical setting  No discrete drainable collection identified  OTHER PERTINENT FINDINGS:  Enthesopathy of the anterior patella along the extensor mechanism attachment  No discrete subcutaneous gas is identified     Impression: No acute bony process is seen  Moderate superficial soft tissue edema in the foot, ankle, and leg, superimposed cellulitis considered in the appropriate clinical setting  Correlation with patient's symptoms and laboratory values recommended  No discrete drainable collection identified  Other findings as above  Workstation performed: [id number]     MRI follow up msk    Result Date: 10/28/2022  Narrative: MRI LEFT FOOT INDICATION:   Inpatient Order left heel eschar, unknown depth, rule out osteomyelitis  COMPARISON: Correlation is made with the prior CT study dated 10/21/2022  TECHNIQUE:  MR sequences were obtained of the left foot including the following:  Localizer, no further images were obtained as the patient could not tolerate the examination due to pain  Imaging performed on 1 5T MRI Gadolinium was not used FINDINGS: The study is nondiagnostic is only localizer images were obtained  The patient could not continue the examination due to underlying pain  Impression: Nondiagnostic study  Workstation performed: [id number]     Echo complete w/ contrast if indicated    Result Date: 10/30/2022  Narrative: •  Left Ventricle: Left ventricular cavity size is normal  There is mild to moderate concentric hypertrophy  The left ventricular ejection fraction is 60% by visual estimation  Systolic function is normal  Wall motion is normal  Diastolic function is normal  •  Right Ventricle: Right ventricular cavity size is normal  Systolic function is normal  •  Aortic Valve: There is aortic sclerosis without clear visualization of the valve leaflets  •  Mitral Valve: There is mild annular calcification  There is trace regurgitation  •  Tricuspid Valve: There is trace regurgitation  Pulmonary artery systolic pressures are estimated at 31 mm Hg  •  There is no study for comparison       Echo follow up/limited w/ contrast if indicated    Result Date: 11/14/2022  Narrative: •  Left Ventricle: Left ventricular cavity size is normal  Wall thickness is normal  The left ventricular ejection fraction is 61%  Systolic function is normal  Wall motion is normal  Diastolic function is normal  •  Mitral Valve: There is mild annular calcification  Compared to prior echocardiographic study dated 10/30/2022, there is no significant change  Labs and pertinent reports reviewed  This note has been generated by voice recognition software system  Therefore, there may be spelling, grammar, and or syntax errors  Please contact if questions arise

## 2022-11-15 NOTE — CASE MANAGEMENT
Case Management Discharge Planning Note    Patient name [address]  [facility] [address] [facility] MRN [id number]  : 1960 Date 11/15/2022       Current Admission Date: 10/21/2022  Current Admission Diagnosis:Stroke [facility])   Patient Active Problem List    Diagnosis Date Noted   • Diarrhea 2022   • CVA (cerebral vascular accident) ([address] ) 2022   • UTI (urinary tract infection) 2022   • Thrombocytopenia ([address] ) 2022   • Diabetic ulcer of heel associated with diabetes mellitus due to underlying condition ([address] ) 11/10/2022   • Acute on chronic anemia 10/30/2022   • Generalized edema due to fluid overload 10/27/2022   • PAD (peripheral artery disease) ([address] ) 10/25/2022   • Non healing left heel wound 10/22/2022   • Hypertensive urgency 10/22/2022   • Hypokalemia 10/22/2022   • Wound of lower extremity 10/21/2022   • Epigastric pain 2022   • Type 2 diabetes mellitus with hyperglycemia, with long-term current use of insulin ([address] ) 10/18/2021   • Hyperparathyroidism ([facility] ) 10/18/2021   • 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 ([facility] [facility] ) 2021   • 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   • Moderate protein-calorie malnutrition ([address] ) 2020   • Acute colitis 2020   • Arthritis 2019   • Depression, recurrent ([address] ) 2018   • Class 3 severe obesity due to excess calories with serious comorbidity and body mass index (BMI) of 40 0 to 44 9 in adult ([address] ) 2018   • Acute respiratory failure with hypoxia ([address] ) 2018   • Esophageal reflux 2018   • Uncontrolled type 2 diabetes with neuropathy 2018   • Diabetic peripheral neuropathy ([address] )    • Systolic murmur    • Stroke ([address] ) 2015   • Anxiety 2015   • Vitamin D deficiency 2015   • Benign essential hypertension 2012   • Familial combined hyperlipidemia 2012      LOS (days): 24  Geometric Mean LOS (GMLOS) (days):   Days to GMLOS:     OBJECTIVE:  Risk of Unplanned Readmission Score: 31 9         Current admission status: Inpatient   Preferred Pharmacy:   [id number], [address] [facility] [address]  [address] [place] [address] [address]  [facility] [address]  Phone: [phone] Fax: [phone]    Primary Care Provider: ALEX Blackwood    Primary Insurance: BLUE CROSS  Secondary Insurance:     DISCHARGE DETAILS:                                          Other Referral/Resources/Interventions Provided:  Government Services[de-identified] [employer] (CM faxed [phone] to [phone] for letter of care determination  Patient is a level 1 PASSR )                                                      Additional Comments: Patient moved out of the ICU overnight to E4 - this CM no longer to follow  Handoff given to new CM

## 2022-11-16 LAB
ABO GROUP BLD BPU: NORMAL
ANION GAP SERPL CALCULATED.3IONS-SCNC: 7 MMOL/L (ref 4–13)
APTT PPP: 56 SECONDS (ref 23–37)
APTT PPP: 64 SECONDS (ref 23–37)
APTT PPP: 76 SECONDS (ref 23–37)
BPU ID: NORMAL
BUN SERPL-MCNC: 14 MG/DL (ref 5–25)
CALCIUM SERPL-MCNC: 7.8 MG/DL (ref 8.3–10.1)
CHLORIDE SERPL-SCNC: 100 MMOL/L (ref 96–108)
CO2 SERPL-SCNC: 29 MMOL/L (ref 21–32)
CREAT SERPL-MCNC: 0.96 MG/DL (ref 0.6–1.3)
CROSSMATCH: NORMAL
ERYTHROCYTE [DISTWIDTH] IN BLOOD BY AUTOMATED COUNT: 15.8 % (ref 11.6–15.1)
GFR SERPL CREATININE-BSD FRML MDRD: 64 ML/MIN/1.73SQ M
GLUCOSE SERPL-MCNC: 143 MG/DL (ref 65–140)
GLUCOSE SERPL-MCNC: 148 MG/DL (ref 65–140)
GLUCOSE SERPL-MCNC: 151 MG/DL (ref 65–140)
GLUCOSE SERPL-MCNC: 153 MG/DL (ref 65–140)
GLUCOSE SERPL-MCNC: 154 MG/DL (ref 65–140)
HCT VFR BLD AUTO: 24.9 % (ref 34.8–46.1)
HGB BLD-MCNC: 7.5 G/DL (ref 11.5–15.4)
MCH RBC QN AUTO: 28 PG (ref 26.8–34.3)
MCHC RBC AUTO-ENTMCNC: 30.1 G/DL (ref 31.4–37.4)
MCV RBC AUTO: 93 FL (ref 82–98)
PLA2R IGG SER IA-ACNC: <1.8 RU/ML (ref 0–19.9)
PLATELET # BLD AUTO: 277 THOUSANDS/UL (ref 149–390)
PMV BLD AUTO: 9.5 FL (ref 8.9–12.7)
POTASSIUM SERPL-SCNC: 3.5 MMOL/L (ref 3.5–5.3)
RBC # BLD AUTO: 2.68 MILLION/UL (ref 3.81–5.12)
SODIUM SERPL-SCNC: 136 MMOL/L (ref 135–147)
UNIT DISPENSE STATUS: NORMAL
UNIT PRODUCT CODE: NORMAL
UNIT PRODUCT VOLUME: 350 ML
UNIT RH: NORMAL
WBC # BLD AUTO: 14.76 THOUSAND/UL (ref 4.31–10.16)

## 2022-11-16 PROCEDURE — 85730 THROMBOPLASTIN TIME PARTIAL: CPT | Performed by: STUDENT IN AN ORGANIZED HEALTH CARE EDUCATION/TRAINING PROGRAM

## 2022-11-16 PROCEDURE — 92526 ORAL FUNCTION THERAPY: CPT

## 2022-11-16 PROCEDURE — 99232 SBSQ HOSP IP/OBS MODERATE 35: CPT | Performed by: SURGERY

## 2022-11-16 PROCEDURE — 80048 BASIC METABOLIC PNL TOTAL CA: CPT | Performed by: STUDENT IN AN ORGANIZED HEALTH CARE EDUCATION/TRAINING PROGRAM

## 2022-11-16 PROCEDURE — 99232 SBSQ HOSP IP/OBS MODERATE 35: CPT | Performed by: STUDENT IN AN ORGANIZED HEALTH CARE EDUCATION/TRAINING PROGRAM

## 2022-11-16 PROCEDURE — 97535 SELF CARE MNGMENT TRAINING: CPT

## 2022-11-16 PROCEDURE — 82948 REAGENT STRIP/BLOOD GLUCOSE: CPT

## 2022-11-16 PROCEDURE — 99233 SBSQ HOSP IP/OBS HIGH 50: CPT | Performed by: INTERNAL MEDICINE

## 2022-11-16 PROCEDURE — 97530 THERAPEUTIC ACTIVITIES: CPT

## 2022-11-16 PROCEDURE — 85027 COMPLETE CBC AUTOMATED: CPT | Performed by: STUDENT IN AN ORGANIZED HEALTH CARE EDUCATION/TRAINING PROGRAM

## 2022-11-16 RX ORDER — ARGATROBAN 1 MG/ML
.1-3 INJECTION INTRAVENOUS
Status: DISCONTINUED | OUTPATIENT
Start: 2022-11-16 | End: 2022-11-23

## 2022-11-16 RX ADMIN — INSULIN GLARGINE 15 UNITS: 100 INJECTION, SOLUTION SUBCUTANEOUS at 20:45

## 2022-11-16 RX ADMIN — PANTOPRAZOLE SODIUM 40 MG: 40 TABLET, DELAYED RELEASE ORAL at 05:38

## 2022-11-16 RX ADMIN — GABAPENTIN 200 MG: 100 CAPSULE ORAL at 09:04

## 2022-11-16 RX ADMIN — QUETIAPINE FUMARATE 25 MG: 25 TABLET ORAL at 20:45

## 2022-11-16 RX ADMIN — OXYCODONE HYDROCHLORIDE 10 MG: 10 TABLET ORAL at 09:03

## 2022-11-16 RX ADMIN — OXYCODONE HYDROCHLORIDE 10 MG: 10 TABLET ORAL at 17:39

## 2022-11-16 RX ADMIN — INSULIN LISPRO 1 UNITS: 100 INJECTION, SOLUTION INTRAVENOUS; SUBCUTANEOUS at 12:44

## 2022-11-16 RX ADMIN — CEFTRIAXONE SODIUM 2000 MG: 10 INJECTION, POWDER, FOR SOLUTION INTRAVENOUS at 03:01

## 2022-11-16 RX ADMIN — AMLODIPINE BESYLATE 10 MG: 10 TABLET ORAL at 09:04

## 2022-11-16 RX ADMIN — DOXAZOSIN 2 MG: 2 TABLET ORAL at 09:04

## 2022-11-16 RX ADMIN — SERTRALINE HYDROCHLORIDE 150 MG: 100 TABLET ORAL at 09:04

## 2022-11-16 RX ADMIN — ARGATROBAN 0.1 MCG/KG/MIN: 50 INJECTION, SOLUTION INTRAVENOUS at 13:52

## 2022-11-16 RX ADMIN — ACETAMINOPHEN 650 MG: 325 TABLET, FILM COATED ORAL at 20:51

## 2022-11-16 RX ADMIN — CLOPIDOGREL BISULFATE 75 MG: 75 TABLET ORAL at 09:04

## 2022-11-16 RX ADMIN — INSULIN LISPRO 1 UNITS: 100 INJECTION, SOLUTION INTRAVENOUS; SUBCUTANEOUS at 17:38

## 2022-11-16 RX ADMIN — ATORVASTATIN CALCIUM 40 MG: 40 TABLET, FILM COATED ORAL at 17:39

## 2022-11-16 RX ADMIN — NYSTATIN: 100000 POWDER TOPICAL at 09:07

## 2022-11-16 RX ADMIN — NYSTATIN: 100000 POWDER TOPICAL at 17:39

## 2022-11-16 RX ADMIN — GABAPENTIN 200 MG: 100 CAPSULE ORAL at 17:39

## 2022-11-16 NOTE — QUICK NOTE
Wound care instructions placed  Continue local wound care, no plan for podiatric surgical intervention  Podiatry signing off, Please tigertext podiatry with any questions, thank you

## 2022-11-16 NOTE — SPEECH THERAPY NOTE
Speech Language/Pathology    Speech/Language Pathology Progress Note    Patient Name: Ami Encarnacion  IGRZT'E Date: 11/16/2022     Problem List  Principal Problem:    Stroke [facility])  Active Problems:    Acute respiratory failure with hypoxia ([facility] [address] )    Depression, recurrent ([facility] )    Benign essential hypertension    Type 2 diabetes mellitus with hyperglycemia, with long-term current use of insulin ([facility])    Wound of lower extremity    Hypokalemia    PAD (peripheral artery disease) ([facility])    Acute on chronic anemia    Diabetic ulcer of heel associated with diabetes mellitus due to underlying condition ([facility])    Thrombocytopenia ([address] )    UTI (urinary tract infection)    Diarrhea       Past Medical History  Past Medical History:   Diagnosis Date   • Anxiety    • Depression    • Diabetes ([facility] )    • Diabetes mellitus ([facility] )    • Esophageal reflux     28 APR 2015 RESOLVED   • Hyperlipidemia    • Hypertension    • Low back pain     sciatica   • Pneumonia 2019   • Stroke ([facility] [address] ) 12/2015    small vessel, L frontal corona radiata  Rx asa 81, Lipitor 40, risk modification   • Vitamin D deficiency         Past Surgical History  Past Surgical History:   Procedure Laterality Date   • ARTERIOGRAM Right 11/7/2022    Procedure: -Right lower extremity angiogram -Right CFA balloon angioplasty with 6ped61le  [employer]  -Right CFA DCB with 6x40 Bard Lutonix -Right CFA atherectomy with Jetstream and distal embolization protection with 7mm Spider FX -Right SFA/Pop angioplasty with 4x40 Chololate balloon -Right SFA/Pop DCB with 5x150 Bard Lutonix -Right SFA stent with [employer] x2 -R   • COLONOSCOPY     • IR AORTAGRAM WITH RUN-OFF  10/31/2022         Subjective:  Pt seen for dysphagia tx  Pt was sleeping but awakened easily  Pt was sat fully upright for tx  "I had a pureed hot dog the other day, imagine that!"  Objective:  Reviewed chart  Pt is much more alert and appropriate compared to initial assessment  Speech is WNL  She is hoping for diet advancement if appropriate  Pt is edentulous, but dentures are at home; pt does not typically wear her dentures at home when eating  She drank thin water by straw, single and consecutive sips  Swallows appeared prompt, with no overt s/s of aspiration  Pt ate part of a soft nutrigrain bar, and cristino crackers  Mastication was mildly prolonged but functional; pt took sips of water to moisten as needed  Bolus formation and transfer appeared fairly prompt, with no pocketing or residue  Pharyngeal swallows appeared prompt  There were no overt s/s of aspiration  Discussed diet upgrade with pt, including choice of dysphagia 3 and regular (choosing softer foods)  Pt would like to try regular and choose softer foods  Pt appears to have good insight, and this choice appears appropriate  Discussed with RN, and sent TT to physician  Assessment:  Pt is much more alert and appropriate today  She tolerated trials of regular solids well, even without her dentures  She does benefit from moistening harder foods to assist with mastication  No s/s of aspiration  Pt is appropriate for diet advancment  Plan/Recommendations:  Change diet to regular, continue thin liquids  Pt will choose softer foods, avoid very hard, crunchy foods  Add sauce, condiments, gravy to moisten  Aspiration precautions, sit fully upright for all PO  OK to try meds whole with water  Take sips of liquid to moisten as needed  ST to follow up to ensure diet toleration

## 2022-11-16 NOTE — ASSESSMENT & PLAN NOTE
UA positive, but likely contaminant rather than an actual urinary tract infection  · Appreciate ID recommendations  Monitor off antibiotics  · Jacinto catheter to be replaced today   Had to maintain this due to her bilateral lower extremity wounds

## 2022-11-16 NOTE — ASSESSMENT & PLAN NOTE
Echo on 11/07 shows 71% LVEF, no diastolic dysfunction   Postprocedural hypoxia  · Currently on 2L NC

## 2022-11-16 NOTE — NURSING NOTE
Assumed care of patient at 0300 agree with previous nurse assessment  Patient resting comfortably in bed  Call bell within reach and bed alarm activated       Kenji Segovia RN

## 2022-11-16 NOTE — OCCUPATIONAL THERAPY NOTE
Occupational Therapy Progress Note     Patient Name: Al Baltazar  COHQW'L Date: 11/16/2022  Problem List  Principal Problem:    Stroke [facility])  Active Problems:    Acute respiratory failure with hypoxia ([facility] [address] )    Depression, recurrent ([facility] [address] )    Benign essential hypertension    Type 2 diabetes mellitus with hyperglycemia, with long-term current use of insulin ([facility])    Wound of lower extremity    Hypokalemia    PAD (peripheral artery disease) ([facility])    Acute on chronic anemia    Diabetic ulcer of heel associated with diabetes mellitus due to underlying condition ([facility])    Thrombocytopenia ([facility])    UTI (urinary tract infection)    Diarrhea        11/16/22 1413   OT Last Visit   OT Visit Date 11/16/22   Note Type   Note Type Treatment   Pain Assessment   Pain Assessment Tool 0-10   Pain Score 5   Pain Location/Orientation Orientation: Bilateral;Location: Generalized   Hospital Pain Intervention(s) Repositioned; Emotional support   Restrictions/Precautions   Weight Bearing Precautions Per Order Yes   RLE Weight Bearing Per Order WBAT   LLE Weight Bearing Per Order WBAT   Braces or Orthoses   (Globoped shoe on LLE, surgical shoe on RLE)   Other Precautions Cognitive; Chair Alarm; Bed Alarm;Multiple lines; Fall Risk;Visual impairment;Telemetry;O2;Pain   ADL   Where Assessed Supine, bed   Grooming Assistance 4  Minimal Assistance   Grooming Deficit Setup;Verbal cueing;Supervision/safety; Wash/dry face;Brushing hair   Grooming Comments able to wash face and brush hair attending to both sides, assist w/ knots had to stop due to pain   Bed Mobility   Supine to Sit 2  Maximal assistance   Additional items Assist x 1; Increased time required;Verbal cues;LE management; Bedrails;HOB elevated  (long sit in bed w/ bed rail support)   Additional Comments pt w/ max support 10 sec and 15 sec tolerance w/ long sitting in bed w/ bed rail support   Therapeutic Exercise - ROM   UE-ROM Yes   ROM- Right Upper Extremities   R Shoulder AAROM; Flexion; Extension   R Weight/Reps/Sets 2 sets x 10 reps   RUE ROM Comment cues for correct techniques, increased weakness and edema noted   ROM - Left Upper Extremities    L Shoulder AAROM; Flexion; Extension   L Weight/Reps/Sets 2 sets x 10 reps   LUE ROM Comment cues for correct techniques, increased weakness and edema noted   Subjective   Subjective "I need a soda"   Cognition   Overall Cognitive Status Impaired   Arousal/Participation Responsive; Cooperative   Attention Attends with cues to redirect   Orientation Level Oriented to person;Oriented to place; Disoriented to time;Disoriented to situation  (stated month as October)   Memory Decreased short term memory;Decreased recall of recent events;Decreased recall of precautions   Following Commands Follows one step commands with increased time or repetition   Comments pt w/ cues to redirect to tasks, flat affect, pt w/ decreased insight into deficits, decreased initiation and effort   Additional Activities   Additional Activities   (education on scanning visual fields)   Additional Activities Comments pt w/ right gaze, assist w/ repositioning in midline   Activity Tolerance   Activity Tolerance Patient limited by fatigue;Patient limited by pain;Treatment limited secondary to medical complications (Comment)   Medical Staff Made Aware appropriate to see per [facility] [place] [facility]   Assessment   Assessment Pt seen for skilled OT session focused on ADLs, bed mobility, UE exercises, cognitive reorientation and visual tasks  Pt w/ decreased attention this session and R gaze w/ max cues able to turn head to left visual field, unable to state correct digits held up during multiple attempts  Pt w/ min assist grooming tasks to wash face and comb hair w/ assist for knots  Pt w/ MAX assist x1 long sitting in bed w/ tolerance of 10-15sec w/ max support and bed rail support  Pt repositioned upright in bed w/ wedges on b/l sides  Pt w/ SPO2 90-94% on 2L O2   Pt completed AAROM of b/l UEs w/ cues to attend to tasks and pt w/ increased lethargy and fatigue noted  Pt seated upright w/ all needs met and alarm intact  Pt continues to require visual cues due to left visual deficits, increased weakness, increased fatigue, poor activity tolerance, decreased endurance, flat affect, increased processing time all causing a decline in ADLs, functional transfers and mobility  Recommend STR when medically stable  Will continue to follow to address OT POC  The patient's raw score on the AM-PAC Daily Activity inpatient short form is 12, standardized score is 30 6, less than 39 4  Patients at this level are likely to benefit from discharge to post-acute rehabilitation services  Please refer to the recommendation of the Occupational Therapist for safe discharge planning  Plan   Treatment Interventions ADL retraining; Endurance training;Functional transfer training;UE strengthening/ROM; Cognitive reorientation;Patient/family training;Equipment evaluation/education; Compensatory technique education; Activityengagement; Energy conservation   Goal Expiration Date 11/29/22   OT Treatment Day 7   OT Frequency 3-5x/wk   Recommendation   OT Discharge Recommendation Post acute rehabilitation services   AM-PAC Daily Activity Inpatient   Lower Body Dressing 1   Bathing 2   Toileting 1   Upper Body Dressing 2   Grooming 3   Eating 3   Daily Activity Raw Score 12   Daily Activity Standardized Score (Calc for Raw Score >=11) 30 6   AM-PAC Applied Cognition Inpatient   Following a Speech/Presentation 3   Understanding Ordinary Conversation 4   Taking Medications 3   Remembering Where Things Are Placed or Put Away 3   Remembering List of 4-5 Errands 2   Taking Care of Complicated Tasks 2   Applied Cognition Raw Score 17   Applied Cognition Standardized Score 36 52     Documentation completed by: Jerrica Anguiano MS, OTR/L

## 2022-11-16 NOTE — ASSESSMENT & PLAN NOTE
65year old female is currently admitted due to a moderate to large volume left MCA territory stroke  · Repeat CT head showed an evolving acute infarct in right parietal-temporal-occipital lobes with questionable petechial cortical hemorrhage in right parietal lobe  Discussed with Dr David Dunaway  Recommended that we continue anticoagulation for now due to the clinical insignificance of the size and the risk of taking her off anticoagulation far outweighs the benefits off of it

## 2022-11-16 NOTE — PROGRESS NOTES
Progress Note- Infectious Disease   Stacie Ye 65 y o  female MRN: [id number]  Unit/Bed#: E4 -01 Encounter: [id number]    IMPRESSION & RECOMMENDATIONS:   1  Asymptomatic bacteriuria  Urine culture added onto previous nephrology UA collected on 11/12/2022 after patient developed fever on 11/13/2022  There was growth of enterobacter and she's been receiving Ceftriaxone  I have low suspicion this finding represents active UTI  Fever likely reactive to emboli as patient stroked later that day  Her persistent leukocytosis is likely reactive to her ongoing B/L leg issues  Additionally, enterobacter may not respond to Ceftriaxone given inducible beta-lactamases, and she has remained stable without treatment with later generation cephalosporin  Given low suspicion for acute UTI I recommend stopping antibiotic treatment for now  Okay to exchange pa catheter now   -stop IV ceftriaxone  -monitor patient off antibiotics  -monitor CBCD and BMP  -okay to exchange pa catheter  -monitor for  symptoms  -monitor urine output    2  Coag negative staph bacteremia  Staph epidermidis present in one set of blood cultures drawn on 11/13/2022  Suspect this is a contaminant and not representative of active bacteremia  Patient has had TTE during neurology stroke work up and there was no evidence of valvular vegetation/endocarditis  No indication for repeat blood cultures or additional work up at this time  3  Stroke  CT head imaging revealing acute R hemispheric stroke on 11/13/2022 after patient experienced change in mental status  Neurology is following  Stroke likely embolic in nature  CTA demonstrated moderate stenosis of the bilateral ICAs and vertebrals  No vegetations noted on TTE  Blood cultures not reflective of active bacteremia    -serial neuro exams  -monitor mood and mental status  -supportive care    4  Severe PAD with multiple B/L LE atheroembolic events and blistering wounds   Patient is following with both podiatry and vascular surgery  Salvageability of the B/L LE is still in question  I suspect patient's lower extremities have been contributing to his ongoing leukocytosis  Wound management is also following   -continue follow up with wound management  -continue follow up with podiatry  -continue follow up with vascular surgery    5  Type 2 diabetes mellitus with long term insulin use  Patient's last HbA1c was 9 8% on 10/25/2022  Elevated blood glucose is risk factor for wounds and infection  Recommend tight glycemic control   -blood glucose management per primary service     6  Morbid obesity  BMI = 41 40  Above plan was discussed in detail with SLIM  HISTORY OF PRESENT ILLNESS:  Reason for Consult: UTI, bacteremia  HPI: Feli Elena is a 65y o  year old female who initially presented to [facility] [place] [id number]  on 10/21/2022 for a L heel wound check  Patient with a chronic fissure and was noncompliant with outpatient follow up  When she returned to the PCP for follow up he noted significant necrosis and wounds on both legs/feet  She had stopped her insulin and became sedentary due to pain and OREILLY  He recommended she go to the hospital immediately for assessment  Upon arrival to the hospital the patient met sepsis criteria with tachycardia and leukocytosis  She was started on antibiotic treatment due to concern for wound infection and for UTI as UA was abnormal and culture grew e coli  Initial blood cultures were negative  Patient completed a week of antibiotic treatment and ID was consulted  No additional antibiotics were recommended  During her clinical course nephrology was consulted on patient and have been managing NYDIA, electrolyte abnormalities, and HTN  Vascular surgery and podiatry have been following patient closely as there is questionable salvageability of her lower extremities   On 10/31/2022 the patient went to IR for abdominal aortogram, LLE run off w/R ALLISON PTA/stent and LLE SFA shockwave angioplasty/DE  Afterwards she had R CFA occlusion, likely from closure device  It was recommended she undergo R femoral endarterectomy with possible antegrade endovascular intervention vs bypass  Unfortunately due to comorbidities it was determined she should move forward with only endovascular procedures  On 11/7/2022 patient underwent RLE angiogram with ballooning, atherectomy, angioplasty, and stenting  Post-procedure she was transferred to critical care  A few days later the patient developed worsening leg pain  CTA with runoff performed and there was concern for occluded R ALLISON stent, bilateral cholesterol emboli to both lower extremities  She returned to IR on 11/10/2022 for aspiration thrombectomy, ballooning, tPA, and stenting  She then returned to [facility]/surg level care  Patient continued to have postprocedure leg pain  She developed coagulation issues and severe thrombocytopenia  Late evening on 11/12/2022 the patient developed a fever of 102 6  Over night blood cultures were sent which show a likely contaminant in 1 set  CXR was sent which showed pulmonary edema and pleural effusions  Later that day urine culture was added to a UA that was pending from day prior that was originally ordered for nephrology work up  She was started on ceftriaxone  Later that morning she had a change in mental status  Head imaging revealed an acute R hemispheric stroke, likely embolic  TTE was performed and was negative  She returned to the ICU  Neurology is following closely  Patient was started on Argatroban and returned to [facility]/surg level care  Given her significant lower extremity tissue damage her podiatry team and vascular surgeons are recommending B/L lower extremity amputations  She has now been on ceftriaxone for growth of enterobacter in her urine  We have been asked for re-consult for bacteremia and UTI      Of note, the patient has a past medical and surgical history significant for stroke, vitamin-D deficiency, back pain, hypertension, hematochezia, weight loss, diarrhea, sciatica, GERD, hyperlipidemia, blurry vision, depression, type 2 diabetes mellitus, obesity, acute encephalopathy, acute respiratory failure, hypertensive urgency, swelling of the hands, tachycardia, over use of Afrin nasal spray, anxiety, former smoker, arthritis, systolic murmur, hip pain, neuropathy, colonoscopy, and IUD  REVIEW OF SYSTEMS:  Unable to fully assess due to patient confusion  She is interactive but distracted by breakfast and off topic, believes I am one of her family members  She tells me she's having terrible pain in both of her legs  Became tearful in telling me they need to be amputated and she doesn't want me to tell anyone else in her family  She denied pain in her chest and abdomen  Was unable to tell me if she was nauseous and doesn't know if she's had vomiting or diarrhea  She had no concerns with her catheter  She did not answer when asked about coughing, sore throat, or runny nose  Patient requested help setting up her breakfast which I did  A complete 12 point system-based review of systems is otherwise negative  PAST MEDICAL HISTORY:  Past Medical History:   Diagnosis Date   • Anxiety    • Depression    • Diabetes ([facility] [address] )    • [facility] mellitus [facility] )    • Esophageal reflux     28 APR 2015 RESOLVED   • Hyperlipidemia    • Hypertension    • Low back pain     sciatica   • Pneumonia 2019   • Stroke ([facility] ) 12/2015    small vessel, L frontal corona radiata   Rx asa 81, Lipitor 40, risk modification   • Vitamin D deficiency      Past Surgical History:   Procedure Laterality Date   • ARTERIOGRAM Right 11/7/2022    Procedure: -Right lower extremity angiogram -Right CFA balloon angioplasty with [id number]  [employer]  -Right CFA DCB with 6x40 Bard Lutonix -Right CFA atherectomy with Jetstream and distal embolization protection with 7mm Spider FX -Right SFA/Pop angioplasty with 4x40 Chololate balloon -Right SFA/Pop DCB with 5x150 Bard Lutonix -Right SFA stent with [address]  Connect2me x2 -R   • COLONOSCOPY     • IR AORTAGRAM WITH RUN-OFF  10/31/2022     FAMILY HISTORY:  Non-contributory    SOCIAL HISTORY:  Social History   Social History     Substance and Sexual Activity   Alcohol Use Never    Comment: OCCASIONAL ALCOHOL USE IN ALL SCRIPTS     Social History     Substance and Sexual Activity   Drug Use No     Social History     Tobacco Use   Smoking Status Former   • Types: Cigarettes   • Quit date:    • Years since quittin 8   Smokeless Tobacco Never     ALLERGIES:  No Known Allergies    MEDICATIONS:  All current active medications have been reviewed  ANTIBIOTICS:  Ceftriaxone 4 - stop  Antibiotic 4 (plus 9 days earlier in hospitalization)    PHYSICAL EXAM:  Temp:  [97 5 °F (36 4 °C)-98 7 °F (37 1 °C)] 98 °F (36 7 °C)  HR:  [] 90  Resp:  [18-20] 20  BP: (145-163)/(59-82) 163/82  SpO2:  [90 %-96 %] 90 %  Temp (24hrs), Av 2 °F (36 8 °C), Min:97 5 °F (36 4 °C), Max:98 7 °F (37 1 °C)  Current: Temperature: 98 °F (36 7 °C)    Intake/Output Summary (Last 24 hours) at 2022 0734  Last data filed at 2022 0600  Gross per 24 hour   Intake 324 17 ml   Output 1075 ml   Net -750 83 ml     General Appearance:  Appearing calm and pleasant but confused  Appears comfortable sitting up in bed in no acute distress  Head:  Normocephalic, without obvious abnormality, atraumatic  Eyes:  Conjunctiva pink and sclera anicteric, both eyes  Nose: Nares normal, mucosa normal, no drainage  Throat: Oropharynx moist without lesions  Neck: Supple, symmetrical, no adenopathy, no tenderness/mass/nodules  Lungs:   Clear to auscultation bilaterally, respirations unlabored on nasal cannula O2  Chest Wall:  No tenderness or deformity  Heart:  Tachycardic; no murmur, rub or gallop  Abdomen:   Soft, obese, non-tender, non-distended, positive bowel sounds throughout     Extremities:    Skin: No rashes noted on exposed skin  Lymph nodes: Cervical, supraclavicular nodes normal    Neurologic: Oriented to self, follows commands  Speech was organized but off topic  LABS, IMAGING, & OTHER STUDIES:  Lab Results:  I have personally reviewed pertinent labs  Results from last 7 days   Lab Units 11/15/22  0559 11/14/22  1049 11/13/22  0148   WBC Thousand/uL 15 62* 16 57* 15 00*   HEMOGLOBIN g/dL 6 4* 7 0* 7 2*   PLATELETS Thousands/uL 193 158 67*     Results from last 7 days   Lab Units 11/15/22  0559 11/14/22  0619 11/13/22  0148 11/12/22  0424 11/11/22  0435   POTASSIUM mmol/L 3 5   < > 3 1* 3 4* 3 3*   CHLORIDE mmol/L 101   < > 102 103 103   CO2 mmol/L 29   < > 31 29 28   BUN mg/dL 16   < > 16 17 18   CREATININE mg/dL 0 95   < > 1 08 1 03 1 00   EGFR ml/min/1 73sq m 64   < > 55 58 60   CALCIUM mg/dL 7 5*   < > 7 8* 7 9* 7 8*   AST U/L  --   --  114* 95* 98*   ALT U/L  --   --  53 46 44   ALK PHOS U/L  --   --  232* 102 112    < > = values in this interval not displayed  Results from last 7 days   Lab Units 11/13/22  0109 11/12/22  1625   BLOOD CULTURE  No Growth at 48 hrs    Staphylococcus coagulase negative*  --    GRAM STAIN RESULT  Gram positive cocci in clusters*  --    URINE CULTURE   --  >100,000 cfu/ml Enterobacter cloacae complex*  70,000-79,000 cfu/ml Yeast*     Results from last 7 days   Lab Units 11/15/22  0559 11/14/22  0619 11/13/22  0148   PROCALCITONIN ng/ml 0 93* 1 00* 0 48*         Results from last 7 days   Lab Units 11/15/22  0559   FERRITIN ng/mL 824*     Results from last 7 days   Lab Units 11/11/22  1918   D-DIMER QUANTITATIVE ug/ml FEU 3 19*

## 2022-11-16 NOTE — PROGRESS NOTES
[facility]  Progress Note - [address] 1960, 65 y o  female MRN: [id number]  Unit/Bed#: E4 -01 Encounter: [id number]  Primary Care Provider: ALEX Redmond   Date and time admitted to hospital: 10/21/2022  7:58 PM    * Stroke [facility])  Assessment & Plan  65year old female is currently admitted due to a moderate to large volume left MCA territory stroke  · Repeat CT head showed an evolving acute infarct in right parietal-temporal-occipital lobes with questionable petechial cortical hemorrhage in right parietal lobe  Discussed with Dr Henok Moya  Recommended that we continue anticoagulation for now due to the clinical insignificance of the size and the risk of taking her off anticoagulation far outweighs the benefits off of it  PAD (peripheral artery disease) ([facility])  Assessment & Plan  Uncontrolled diabetes, hypertension, hyperlipidemia  Lost to follow-up and poorly compliant to diabetic regimen  · S/p abdominal aortogram,  LLE SFA shockwave angioplasty/FATOU 10/31/22   · S/p R femoral endarterectomy 11/7  · S/p aspiration thrombectomy in right common iliac artery and left SFA  · Heparin drip transitioned to Argatroban in the setting of thrombocytopenia and concern for HIT  · Continue statin / Plavix  · Vascular surgery planning for probable bilateral lower extremity amputation  Will obtain cardiac clearance given her elevated risk  (Recently had one, but this was prior to her ICU level of care / stroke)  UTI (urinary tract infection)  Assessment & Plan  UA positive, but likely contaminant rather than an actual urinary tract infection  · Appreciate ID recommendations  Monitor off antibiotics  · Jacinto catheter to be replaced today   Had to maintain this due to her bilateral lower extremity wounds    Thrombocytopenia ([address] )  Assessment & Plan  Possibly due to HIT  · Awaiting heparin antibody by serotonin release  · Heparin induced antibody elevated    Recent Labs 11/14/22  1049 11/15/22  0559 11/16/22  0904    193 [id number]         Diabetic ulcer of heel associated with diabetes mellitus due to underlying condition [facility])  Assessment & Plan  · Vascular surgery planning bilateral BKA  Awaiting intervention date        Acute on chronic anemia  Assessment & Plan  S/p 2U pRBC  Once intervention date has been set, will likely have to transfuse her to 8 0 per my discussion with vascular surgery today  Recent Labs     11/15/22  0559 11/16/22  0904   HGB 6 4* 7 5*         Hypokalemia  Assessment & Plan  Likely due to GI losses  Resolved  · Continue repleting PRN    Recent Labs     11/14/22  0619 11/15/22  0559 11/16/22  0904   K 3 2* 3 5 3 5   MG  --  1 8  --          Type 2 diabetes mellitus with hyperglycemia, with long-term current use of insulin [facility])  Assessment & Plan  Lab Results   Component Value Date    HGBA1C 9 8 (H) 10/25/2022     Recent Labs     11/15/22  2117 11/16/22  0725 11/16/22  1115 11/16/22  1600   POCGLU 179* 143* 154* 153*     · Inpatient regimen: Sliding scale, Glargine 15U HS    Benign essential hypertension  Assessment & Plan  Above goal  · Continue amlodipine, Cardura    Depression, recurrent (HCC)  Assessment & Plan  Evaluated by Psychiatry on this admission  · Continue Zoloft 150 mg daily / Quetiapine 25mg po Qhs    Acute respiratory failure with hypoxia ([address] )  Assessment & Plan  Echo on 11/07 shows 48% LVEF, no diastolic dysfunction  Postprocedural hypoxia  · Currently on 2L NC      VTE Pharmacologic Prophylaxis:   Pharmacologic: Argatroban  Mechanical VTE Prophylaxis in Place: Yes    Discussions with Specialists or Other Care Team Provider: nursing    Education and Discussions with Family / Patient: patient    Time Spent for Care: 30 minutes  More than 50% of total time spent on counseling and coordination of care as described above      Current Length of Stay: 25 day(s)    Current Patient Status: Inpatient   Certification Statement: The patient will continue to require additional inpatient hospital stay due to cardiology clearance, vascular surgery intervention    Discharge Plan: active    Code Status: Level 1 - Full Code      Subjective:   Patient seen and examined at bedside  No new complaints overnight  Objective:     Vitals:   Temp (24hrs), Av 5 °F (36 9 °C), Min:98 °F (36 7 °C), Max:99 4 °F (37 4 °C)    Temp:  [98 °F (36 7 °C)-99 4 °F (37 4 °C)] 99 4 °F (37 4 °C)  HR:  [88-93] 93  Resp:  [20] 20  BP: (124-165)/(61-82) 124/62  SpO2:  [90 %-95 %] 92 %  Body mass index is 41 4 kg/m²  Input and Output Summary (last 24 hours): Intake/Output Summary (Last 24 hours) at 2022 1728  Last data filed at 2022 1248  Gross per 24 hour   Intake --   Output 1750 ml   Net -1750 ml       Physical Exam:     Physical Exam  Vitals reviewed  Constitutional:       General: She is not in acute distress  HENT:      Head: Normocephalic  Nose: Nose normal       Mouth/Throat:      Mouth: Mucous membranes are moist    Eyes:      General: No scleral icterus  Cardiovascular:      Rate and Rhythm: Normal rate  Pulmonary:      Effort: Pulmonary effort is normal  No respiratory distress  Abdominal:      Palpations: Abdomen is soft  Tenderness: There is no abdominal tenderness  Musculoskeletal:      Comments: Bilateral foot wounds with necrotic digit   Skin:     General: Skin is warm  Neurological:      Mental Status: She is alert  Mental status is at baseline     Psychiatric:         Mood and Affect: Mood normal          Behavior: Behavior normal        Additional Data:     Labs:    Results from last 7 days   Lab Units 22  0904 11/15/22  0559 22  1049 22  0148   WBC Thousand/uL 14 76*   < > 16 57* 15 00*   HEMOGLOBIN g/dL 7 5*   < > 7 0* 7 2*   HEMATOCRIT % 24 9*   < > 21 9* 22 8*   PLATELETS Thousands/uL 277   < > 158 67*   NEUTROS PCT %  --   --   --  84*   LYMPHS PCT %  --   --   --  5*   LYMPHO PCT %  --   --  4* --    MONOS PCT %  --   --   --  9   MONO PCT %  --   --  3*  --    EOS PCT %  --   --  1 1    < > = values in this interval not displayed  Results from last 7 days   Lab Units 11/16/22  0904 11/14/22  0619 11/13/22  0148   SODIUM mmol/L 136   < > 140   POTASSIUM mmol/L 3 5   < > 3 1*   CHLORIDE mmol/L 100   < > 102   CO2 mmol/L 29   < > 31   BUN mg/dL 14   < > 16   CREATININE mg/dL 0 96   < > 1 08   ANION GAP mmol/L 7   < > 7   CALCIUM mg/dL 7 8*   < > 7 8*   ALBUMIN g/dL  --   --  1 5*   TOTAL BILIRUBIN mg/dL  --   --  0 67   ALK PHOS U/L  --   --  232*   ALT U/L  --   --  53   AST U/L  --   --  114*   GLUCOSE RANDOM mg/dL 151*   < > 85    < > = values in this interval not displayed  Results from last 7 days   Lab Units 11/11/22  1918   INR  3 70*     Results from last 7 days   Lab Units 11/16/22  1600 11/16/22  1115 11/16/22  0725 11/15/22  2117 11/15/22  1607 11/15/22  1051 11/15/22  0735 11/14/22  2157 11/14/22  1722 11/14/22  1203 11/14/22  0636 11/13/22  1638   POC GLUCOSE mg/dl 153* 154* 143* 179* 134 163* 132 204* 186* 146* 107 104         Results from last 7 days   Lab Units 11/15/22  0559 11/14/22  0619 11/13/22  0148   LACTIC ACID mmol/L  --   --  1 0   PROCALCITONIN ng/ml 0 93* 1 00* 0 48*           * I Have Reviewed All Lab Data Listed Above  * Additional Pertinent Lab Tests Reviewed: [address] Admission Reviewed      Lines:  Invasive Devices     Peripheral Intravenous Line  Duration           Peripheral IV 11/15/22 Left Antecubital 1 day    Peripheral IV 11/15/22 Right Antecubital 1 day          Drain  Duration           Urethral Catheter Latex 16 Fr  9 days               Imaging:    Imaging Reports Reviewed Today Include: No new imaging    Recent Cultures (last 7 days):     Results from last 7 days   Lab Units 11/13/22  0109 11/12/22  1625   BLOOD CULTURE  No Growth at 72 hrs    Staphylococcus coagulase negative*  --    GRAM STAIN RESULT  Gram positive cocci in clusters* --    URINE CULTURE   --  >100,000 cfu/ml Enterobacter cloacae complex*  70,000-79,000 cfu/ml Yeast*       Last 24 Hours Medication List:   Current Facility-Administered Medications   Medication Dose Route Frequency Provider Last Rate   • acetaminophen  650 mg Oral Q6H PRN Shae Arita MD     • ALPRAZolam  0 25 mg Oral Daily PRN Jose David Pennington MD     • amLODIPine  10 mg Oral Daily Shae Arita MD     • argatroban  0 1-3 mcg/kg/min Intravenous Titrated Jose David Pennington MD 0 1 mcg/kg/min (11/16/22 1352)   • atorvastatin  40 mg Oral Daily With Carlos Eduardo Dc MD     • [facility] Hold] chlorhexidine  15 mL Swish & Spit Once Kelley Payne DO     • clopidogrel  75 mg Oral Daily Lorenza Vargas MD     • doxazosin  2 mg Oral Daily Lorenza Vargas MD     • gabapentin  200 mg Oral BID Lorenza Vargas MD     • heparin 2000 units and papaverine 60 mg in isolyte equivalent 500 mL irrigation bag   Irrigation Once Jelly Robles MD     • insulin glargine  15 Units Subcutaneous HS Lorenza Vargas MD     • insulin lispro  1-6 Units Subcutaneous TID AC Lorenza Vargas MD     • naloxone  0 04 mg Intravenous Q1MIN PRN Lorenza Vargas MD     • nystatin   Topical BID Lorenza Vargas MD     • ondansetron  4 mg Intravenous Q6H PRN Lorenza Vargas MD     • oxyCODONE  10 mg Oral Q4H PRN Lorenza Vargas MD     • pantoprazole  40 mg Oral Early Morning Lorenza Vargas MD     • polyethylene glycol  17 g Oral Daily PRN Lorenza Vargas MD     • QUEtiapine  25 mg Oral HS Lorenza Vargas MD     • senna  1 tablet Oral HS PRN Lorenza Vargas MD     • sertraline  150 mg Oral Daily Lorenza Vargas MD          Today, Patient Was Seen By: Salinas Barahona MD    ** Please Note: Dictation voice to text software may have been used in the creation of this document   **

## 2022-11-16 NOTE — ASSESSMENT & PLAN NOTE
Uncontrolled diabetes, hypertension, hyperlipidemia  Lost to follow-up and poorly compliant to diabetic regimen  · S/p abdominal aortogram,  LLE SFA shockwave angioplasty/FATOU 10/31/22   · S/p R femoral endarterectomy 11/7  · S/p aspiration thrombectomy in right common iliac artery and left SFA  · Heparin drip transitioned to Argatroban in the setting of thrombocytopenia and concern for HIT  · Continue statin / Plavix  · Vascular surgery planning for probable bilateral lower extremity amputation  Will obtain cardiac clearance given her elevated risk  (Recently had one, but this was prior to her ICU level of care / stroke)

## 2022-11-16 NOTE — ASSESSMENT & PLAN NOTE
S/p 2U pRBC  Once intervention date has been set, will likely have to transfuse her to 8 0 per my discussion with vascular surgery today      Recent Labs     11/15/22  0559 11/16/22  0904   HGB 6 4* 7 5*

## 2022-11-16 NOTE — ASSESSMENT & PLAN NOTE
Lab Results   Component Value Date    HGBA1C 9 8 (H) 10/25/2022     Recent Labs     11/15/22  2117 11/16/22  0725 11/16/22  1115 11/16/22  1600   POCGLU 179* 143* 154* 153*     · Inpatient regimen: Sliding scale, Glargine 15U HS

## 2022-11-16 NOTE — PROGRESS NOTES
Progress Note - General Surgery   Daiana Prudent 65 y o  female MRN: [id number]  Unit/Bed#: E4 -01 Encounter: [id number]    Assessment:  62yo F with PAD s/p multiple BLE endovascular interventions c/b atheroembolic events to BLE, now with subacute right-sided CVA    dopplerable AT bilaterally    Plan:  - will continue to discuss limb salvageability and recommended interventions  - remainder of care per primary team  - please tigertext the vascular surgery role with any questions    Subjective/Objective   Subjective:   No acute events overnight  Patient is oriented and is understanding of her condition at this time  Objective:     Blood pressure 153/72, pulse 88, temperature 98 3 °F (36 8 °C), temperature source Temporal, resp  rate 20, height 5' 4" (1 626 m), weight 109 kg (241 lb 2 9 oz), SpO2 95 %, not currently breastfeeding  ,Body mass index is 41 55 kg/m²  Intake/Output Summary (Last 24 hours) at 11/16/2022 0701  Last data filed at 11/16/2022 0600  Gross per 24 hour   Intake 324 17 ml   Output 1075 ml   Net -750 83 ml       Invasive Devices     Peripheral Intravenous Line  Duration           Peripheral IV 11/15/22 Left Antecubital <1 day    Peripheral IV 11/15/22 Right Antecubital <1 day          Drain  Duration           Urethral Catheter Latex 16 Fr  8 days                Physical Exam:  General: No acute distress  Neuro: alert and oriented  HEENT: moist mucous membranes  CV: Well perfused, regular rate and rhythm  Lungs: Normal work of breathing, no increased respiratory effort  Abdomen: Soft, non-tender, non-distended  Extremities: No edema, clubbing or cyanosis   BLE with blisters, clean dressings in place, no motor function  Skin: Warm, dry          Tika Gilbert MD  General Surgery PGY1

## 2022-11-16 NOTE — PLAN OF CARE
Problem: OCCUPATIONAL THERAPY ADULT  Goal: Performs self-care activities at highest level of function for planned discharge setting  See evaluation for individualized goals  Description: Treatment Interventions: ADL retraining, Functional transfer training, UE strengthening/ROM, Endurance training, Cognitive reorientation, Patient/family training, Equipment evaluation/education, Compensatory technique education, Energy conservation, Activityengagement          See flowsheet documentation for full assessment, interventions and recommendations  Note: Limitation: Decreased ADL status, Decreased UE strength, Decreased endurance, Decreased high-level ADLs (New L visual field deficits)  Prognosis: Good  Assessment: Pt seen for skilled OT session focused on ADLs, bed mobility, UE exercises, cognitive reorientation and visual tasks  Pt w/ decreased attention this session and R gaze w/ max cues able to turn head to left visual field, unable to state correct digits held up during multiple attempts  Pt w/ min assist grooming tasks to wash face and comb hair w/ assist for knots  Pt w/ MAX assist x1 long sitting in bed w/ tolerance of 10-15sec w/ max support and bed rail support  Pt repositioned upright in bed w/ wedges on b/l sides  Pt w/ SPO2 90-94% on 2L O2  Pt completed AAROM of b/l UEs w/ cues to attend to tasks and pt w/ increased lethargy and fatigue noted  Pt seated upright w/ all needs met and alarm intact  Pt continues to require visual cues due to left visual deficits, increased weakness, increased fatigue, poor activity tolerance, decreased endurance, flat affect, increased processing time all causing a decline in ADLs, functional transfers and mobility  Recommend STR when medically stable  Will continue to follow to address OT POC  The patient's raw score on the AM-PAC Daily Activity inpatient short form is 12, standardized score is 30 6, less than 39 4   Patients at this level are likely to benefit from discharge to post-acute rehabilitation services  Please refer to the recommendation of the Occupational Therapist for safe discharge planning       OT Discharge Recommendation: Post acute rehabilitation services

## 2022-11-16 NOTE — PROGRESS NOTES
[facility] Podiatry - Progress Note  Patient: Wesley Reed 65 y o  female   MRN: [id number]  PCP: AELX Montalvo  Unit/Bed#: E4 -01 Encounter: [id number]  Date Of Visit: 22    ASSESSMENT:    Wesley Reed is a 65 y o  female with:    1  Diabetic ulcer left heel  2  Eschar / ischemic change right great toe and 5th toe  3  Type II diabetes with PAD              -s/p abdominal aortogram w/LLE intervention (DOS: 10/31/22)              -s/p RLE angiogram with intervention (DOS: 22)      PLAN:    · ***  · Continue local wound care, ***appreciate nursing assistance with dressing changes  · Elevation on green foam wedges or pillows when non-ambulatory  · ***Rest of care per primary team     Weight bearing status: WB as tolerated in heel offloading shoe left foot, weight bearing as tolerated right foot      SUBJECTIVE:     The patient was seen, evaluated, and assessed at bedside today  The patient was awake, alert, and in no acute distress  No acute events overnight  The patient reports ***  Patient denies N/V/F/chills/SOB/CP  OBJECTIVE:     Vitals:   /72 (BP Location: Left arm)   Pulse 88   Temp 98 3 °F (36 8 °C) (Temporal)   Resp 20   Ht 5' 4" (1 626 m)   Wt 109 kg (241 lb 2 9 oz)   SpO2 95%   BMI 41 40 kg/m²     Temp (24hrs), Av 2 °F (36 8 °C), Min:97 5 °F (36 4 °C), Max:98 7 °F (37 1 °C)      Physical Exam:     General:  Alert, cooperative, and in no distress  Lower extremity exam:  Cardiovascular status at baseline  Neurological status at baseline  Musculoskeletal status at baseline  No calf tenderness noted   ***    Lower extremity wound(s) as noted below:  Wound #: ***  Location: ***  Length ***cm: Width ***cm: Depth ***cm:   Deepest Tissue Noted in Base: ***  Probe to Bone: {YES/NO:53683}  Peripheral Skin Description: {wound edge:46672}  Granulation: ***% Fibrotic Tissue: ***% Necrotic Tissue: ***%   Drainage Amount: {drainage:27318}  Signs of Infection: {YES/NO:63807}    Clinical Images 11/16/22:  ***    Additional Data:     Labs:    Results from last 7 days   Lab Units 11/15/22  0559 11/14/22  1049 11/13/22  0148   WBC Thousand/uL 15 62* 16 57* 15 00*   HEMOGLOBIN g/dL 6 4* 7 0* 7 2*   HEMATOCRIT % 20 6* 21 9* 22 8*   PLATELETS Thousands/uL 193 158 67*   NEUTROS PCT %  --   --  84*   LYMPHS PCT %  --   --  5*   LYMPHO PCT %  --  4*  --    MONOS PCT %  --   --  9   MONO PCT %  --  3*  --    EOS PCT %  --  1 1     Results from last 7 days   Lab Units 11/15/22  0559 11/14/22  0619 11/13/22  0148   POTASSIUM mmol/L 3 5   < > 3 1*   CHLORIDE mmol/L 101   < > 102   CO2 mmol/L 29   < > 31   BUN mg/dL 16   < > 16   CREATININE mg/dL 0 95   < > 1 08   CALCIUM mg/dL 7 5*   < > 7 8*   ALK PHOS U/L  --   --  232*   ALT U/L  --   --  53   AST U/L  --   --  114*    < > = values in this interval not displayed  Results from last 7 days   Lab Units 11/11/22  1918   INR  3 70*       * I Have Reviewed All Lab Data Listed Above  Recent Cultures (last 7 days):     Results from last 7 days   Lab Units 11/13/22  0109 11/12/22  1625   BLOOD CULTURE  No Growth at 48 hrs  Staphylococcus coagulase negative*  --    GRAM STAIN RESULT  Gram positive cocci in clusters*  --    URINE CULTURE   --  >100,000 cfu/ml Enterobacter cloacae complex*  70,000-79,000 cfu/ml Yeast*           Imaging: I have personally reviewed pertinent films in PACS  EKG, Pathology, and Other Studies: I have personally reviewed pertinent reports  ** Please Note: Portions of the record may have been created with voice recognition software  Occasional wrong word or "sound a like" substitutions may have occurred due to the inherent limitations of voice recognition software  Read the chart carefully and recognize, using context, where substitutions have occurred   **

## 2022-11-16 NOTE — ASSESSMENT & PLAN NOTE
Possibly due to HIT  · Awaiting heparin antibody by serotonin release  · Heparin induced antibody elevated    Recent Labs     11/14/22  1049 11/15/22  0559 11/16/22  0904    193 277

## 2022-11-16 NOTE — ASSESSMENT & PLAN NOTE
Likely due to GI losses   Resolved  · Continue repleting PRN    Recent Labs     11/14/22  0619 11/15/22  0559 11/16/22  0904   K 3 2* 3 5 3 5   MG  --  1 8  --

## 2022-11-17 LAB
ANION GAP SERPL CALCULATED.3IONS-SCNC: 6 MMOL/L (ref 4–13)
APTT PPP: 103 SECONDS (ref 23–37)
APTT PPP: 51 SECONDS (ref 23–37)
APTT PPP: 71 SECONDS (ref 23–37)
APTT PPP: 83 SECONDS (ref 23–37)
APTT PPP: 98 SECONDS (ref 23–37)
BACTERIA UR CULT: ABNORMAL
BACTERIA UR CULT: ABNORMAL
BUN SERPL-MCNC: 12 MG/DL (ref 5–25)
CALCIUM SERPL-MCNC: 8.2 MG/DL (ref 8.3–10.1)
CHLORIDE SERPL-SCNC: 102 MMOL/L (ref 96–108)
CO2 SERPL-SCNC: 32 MMOL/L (ref 21–32)
CREAT SERPL-MCNC: 1.06 MG/DL (ref 0.6–1.3)
ERYTHROCYTE [DISTWIDTH] IN BLOOD BY AUTOMATED COUNT: 15.5 % (ref 11.6–15.1)
GFR SERPL CREATININE-BSD FRML MDRD: 56 ML/MIN/1.73SQ M
GLUCOSE SERPL-MCNC: 126 MG/DL (ref 65–140)
GLUCOSE SERPL-MCNC: 135 MG/DL (ref 65–140)
GLUCOSE SERPL-MCNC: 159 MG/DL (ref 65–140)
GLUCOSE SERPL-MCNC: 187 MG/DL (ref 65–140)
GLUCOSE SERPL-MCNC: 190 MG/DL (ref 65–140)
HCT VFR BLD AUTO: 24.8 % (ref 34.8–46.1)
HGB BLD-MCNC: 7.5 G/DL (ref 11.5–15.4)
MAGNESIUM SERPL-MCNC: 1.8 MG/DL (ref 1.6–2.6)
MCH RBC QN AUTO: 27 PG (ref 26.8–34.3)
MCHC RBC AUTO-ENTMCNC: 30.2 G/DL (ref 31.4–37.4)
MCV RBC AUTO: 89 FL (ref 82–98)
NRBC BLD AUTO-RTO: 0 /100 WBCS
PLATELET # BLD AUTO: 382 THOUSANDS/UL (ref 149–390)
PMV BLD AUTO: 9.3 FL (ref 8.9–12.7)
POTASSIUM SERPL-SCNC: 3.2 MMOL/L (ref 3.5–5.3)
RBC # BLD AUTO: 2.78 MILLION/UL (ref 3.81–5.12)
SODIUM SERPL-SCNC: 140 MMOL/L (ref 135–147)
WBC # BLD AUTO: 20.5 THOUSAND/UL (ref 4.31–10.16)

## 2022-11-17 PROCEDURE — 99232 SBSQ HOSP IP/OBS MODERATE 35: CPT | Performed by: SURGERY

## 2022-11-17 PROCEDURE — 99233 SBSQ HOSP IP/OBS HIGH 50: CPT | Performed by: INTERNAL MEDICINE

## 2022-11-17 PROCEDURE — 83735 ASSAY OF MAGNESIUM: CPT | Performed by: STUDENT IN AN ORGANIZED HEALTH CARE EDUCATION/TRAINING PROGRAM

## 2022-11-17 PROCEDURE — 85027 COMPLETE CBC AUTOMATED: CPT | Performed by: STUDENT IN AN ORGANIZED HEALTH CARE EDUCATION/TRAINING PROGRAM

## 2022-11-17 PROCEDURE — 99232 SBSQ HOSP IP/OBS MODERATE 35: CPT | Performed by: STUDENT IN AN ORGANIZED HEALTH CARE EDUCATION/TRAINING PROGRAM

## 2022-11-17 PROCEDURE — 80048 BASIC METABOLIC PNL TOTAL CA: CPT | Performed by: STUDENT IN AN ORGANIZED HEALTH CARE EDUCATION/TRAINING PROGRAM

## 2022-11-17 PROCEDURE — 85730 THROMBOPLASTIN TIME PARTIAL: CPT | Performed by: STUDENT IN AN ORGANIZED HEALTH CARE EDUCATION/TRAINING PROGRAM

## 2022-11-17 PROCEDURE — 82948 REAGENT STRIP/BLOOD GLUCOSE: CPT

## 2022-11-17 PROCEDURE — NC001 PR NO CHARGE: Performed by: NURSE PRACTITIONER

## 2022-11-17 PROCEDURE — 99232 SBSQ HOSP IP/OBS MODERATE 35: CPT | Performed by: INTERNAL MEDICINE

## 2022-11-17 PROCEDURE — 92526 ORAL FUNCTION THERAPY: CPT

## 2022-11-17 RX ORDER — SENNOSIDES 8.6 MG
1 TABLET ORAL
Status: DISCONTINUED | OUTPATIENT
Start: 2022-11-17 | End: 2022-11-24

## 2022-11-17 RX ORDER — OXYCODONE HYDROCHLORIDE 5 MG/1
5 TABLET ORAL EVERY 4 HOURS PRN
Status: DISCONTINUED | OUTPATIENT
Start: 2022-11-17 | End: 2022-11-28

## 2022-11-17 RX ORDER — POTASSIUM CHLORIDE 20MEQ/15ML
20 LIQUID (ML) ORAL
Status: COMPLETED | OUTPATIENT
Start: 2022-11-17 | End: 2022-11-17

## 2022-11-17 RX ADMIN — INSULIN GLARGINE 15 UNITS: 100 INJECTION, SOLUTION SUBCUTANEOUS at 20:57

## 2022-11-17 RX ADMIN — OXYCODONE HYDROCHLORIDE 10 MG: 10 TABLET ORAL at 12:16

## 2022-11-17 RX ADMIN — ALPRAZOLAM 0.25 MG: 0.25 TABLET ORAL at 09:29

## 2022-11-17 RX ADMIN — NYSTATIN: 100000 POWDER TOPICAL at 17:07

## 2022-11-17 RX ADMIN — ACETAMINOPHEN 650 MG: 325 TABLET, FILM COATED ORAL at 06:16

## 2022-11-17 RX ADMIN — GABAPENTIN 200 MG: 100 CAPSULE ORAL at 09:19

## 2022-11-17 RX ADMIN — OXYCODONE HYDROCHLORIDE 10 MG: 10 TABLET ORAL at 17:06

## 2022-11-17 RX ADMIN — QUETIAPINE FUMARATE 25 MG: 25 TABLET ORAL at 20:57

## 2022-11-17 RX ADMIN — ATORVASTATIN CALCIUM 40 MG: 40 TABLET, FILM COATED ORAL at 17:06

## 2022-11-17 RX ADMIN — INSULIN LISPRO 1 UNITS: 100 INJECTION, SOLUTION INTRAVENOUS; SUBCUTANEOUS at 12:06

## 2022-11-17 RX ADMIN — POTASSIUM CHLORIDE 20 MEQ: 20 SOLUTION ORAL at 20:57

## 2022-11-17 RX ADMIN — CLOPIDOGREL BISULFATE 75 MG: 75 TABLET ORAL at 09:19

## 2022-11-17 RX ADMIN — GABAPENTIN 200 MG: 100 CAPSULE ORAL at 17:08

## 2022-11-17 RX ADMIN — POTASSIUM CHLORIDE 20 MEQ: 20 SOLUTION ORAL at 18:49

## 2022-11-17 RX ADMIN — SERTRALINE HYDROCHLORIDE 150 MG: 100 TABLET ORAL at 09:19

## 2022-11-17 RX ADMIN — NYSTATIN: 100000 POWDER TOPICAL at 09:19

## 2022-11-17 RX ADMIN — INSULIN LISPRO 1 UNITS: 100 INJECTION, SOLUTION INTRAVENOUS; SUBCUTANEOUS at 17:06

## 2022-11-17 RX ADMIN — PANTOPRAZOLE SODIUM 40 MG: 40 TABLET, DELAYED RELEASE ORAL at 06:16

## 2022-11-17 RX ADMIN — AMLODIPINE BESYLATE 10 MG: 10 TABLET ORAL at 09:19

## 2022-11-17 NOTE — ASSESSMENT & PLAN NOTE
S/p 2U pRBC  Once intervention date has been set, will likely have to transfuse her to 8 0 per my discussion with vascular surgery today      Recent Labs     11/16/22  0904 11/17/22  0504   HGB 7 5* 7 5*

## 2022-11-17 NOTE — SPEECH THERAPY NOTE
Speech Language/Pathology    Speech/Language Pathology Progress Note    Patient Name: Fina Eubanks  ZFUPY'[id number] Date: 11/17/2022     Problem List  Principal Problem:    Stroke [facility])  Active Problems:    Acute respiratory failure with hypoxia ([facility])    Depression, recurrent ([facility] )    Benign essential hypertension    Type 2 diabetes mellitus with hyperglycemia, with long-term current use of insulin ([facility])    Wound of lower extremity    Hypokalemia    PAD (peripheral artery disease) ([facility])    Acute on chronic anemia    Diabetic ulcer of heel associated with diabetes mellitus due to underlying condition ([facility])    Thrombocytopenia ([facility] )    UTI (urinary tract infection)    Diarrhea       Past Medical History  Past Medical History:   Diagnosis Date   • Anxiety    • Depression    • Diabetes ([place] [address] )    • Diabetes mellitus ([place] [address] )    • Esophageal reflux     28 APR 2015 RESOLVED   • Hyperlipidemia    • Hypertension    • Low back pain     sciatica   • Pneumonia 2019   • Stroke ([facility] ) 12/2015    small vessel, L frontal corona radiata  Rx asa 81, Lipitor 40, risk modification   • Vitamin D deficiency         Past Surgical History  Past Surgical History:   Procedure Laterality Date   • ARTERIOGRAM Right 11/7/2022    Procedure: -Right lower extremity angiogram -Right CFA balloon angioplasty with [id number]  [employer]  -Right CFA DCB with 6x40 Bard Lutonix -Right CFA atherectomy with Jetstream and distal embolization protection with 7mm Spider FX -Right SFA/Pop angioplasty with 4x40 Chololate balloon -Right SFA/Pop DCB with 5x150 Bard Lutonix -Right SFA stent with [employer] x2 -R   • COLONOSCOPY     • IR AORTAGRAM WITH RUN-OFF  10/31/2022   • IR LOWER EXTREMITY ANGIOGRAM  11/10/2022         Subjective:  Pt seen for dysphagia tx  Pt sitting upright in bed, eating lunch  Pt alert, pleasant, cooperative  Objective:  Discussed with RN, who reports pt tolerating diet well   Pt also feels she is tolerating regular diet, does not report any difficulty  Pt fed herself a portion of her lunch, including grilled cheese, tomato soup, fresh fruit (melon, pineapple, grapes, lemon meringue pie, and sips of soda via straw  Pt is edentulous, but mastication, bolus formation and transfer was prompt, pharyngeal swallows appear WNL, with no overt s/s of aspiration  Pt denies any speech/language deficits since her stroke, and none observed during conversation  Assessment:  Pt appears to be tolerating regular diet and thin liquids, no overt s/s of aspiration  No speech/language deficits from stroke    Plan/Recommendations:  Continue regular diet and thin liquids  No further ST indicated   Discharge from [address] [place] [address]

## 2022-11-17 NOTE — ASSESSMENT & PLAN NOTE
Lab Results   Component Value Date    HGBA1C 9 8 (H) 10/25/2022     Recent Labs     11/16/22 2055 11/17/22  0752 11/17/22  1118 11/17/22  1546   POCGLU 148* 135 159* 187*     · Inpatient regimen: Sliding scale, Glargine 15U HS

## 2022-11-17 NOTE — ASSESSMENT & PLAN NOTE
Possibly due to HIT  · Awaiting heparin antibody by serotonin release  · Heparin induced antibody elevated    Recent Labs     11/15/22  0559 11/16/22  0904 11/17/22  0504    [phone]

## 2022-11-17 NOTE — PROGRESS NOTES
[address] [facility]  [facility] 1960, 65 y o  female MRN: [id number]  Unit/Bed#: E4 -01 Encounter: [id number]  Primary Care Provider: ALEX John   Date and time admitted to hospital: 10/21/2022  7:58 PM    PAD (peripheral artery disease) [facility])  Assessment & Plan  65year old female former smoker w/HTN, HLD, uncontrolled type II DM (A1c 9 8), hx CVA, presenting with nonhealing extensive L heel ulceration and R hallux and 5th digit ulceration  Vascular surgery consulted for input  S/p multiple b/l endovascular interventions  HIT  R hemispheric CVA    Recommendations:  -Bilateral tissue loss in the setting of uncontrolled type II DM and NYDIA on admission, now s/p angiogram w/intervention  Groin access is soft without hematoma or ecchymosis  -Now s/p multiple bilateral lower extremity endovascular interventions, c/b atheroembolic event to the RLE and JUSTIN  -Patient will require bilateral lower extremity major amputations  Would recommend holding off on the RLE to allow poor perfused superficial tissue loss to demarcate/slough to allow us to determine level of amputation  Do recommend proceeding with LLE amputation next week  -Extensive conversation with patient regarding level of LLE amputation  Could consider attempting LLE BKA with possibility of requiring more proximal amputation (revision to AKA) vs proceeding with AKA  However, patient wishes to forgo attempt at [address] and proceed with L AKA  Scheduling in process for next week  -Cardiology consulted  Appreciate recommendations  -Medical management with ASA, statin, Plavix  -Medical management and glucose control per SLIM  Continue to trend Hgb  -Cont argatroban drip in setting of HIT  -Patient seen and examined with Dr Jeanine Perez:  Patient seen examined  Remains hemodynamically stable  Wounds of the bilateral lower extremities examined  Patient will require bilateral amputations  Plan to proceed with left lower extremity amputation next week  Patient is in agreement at this time  Vitals:  /70 (BP Location: Right arm)   Pulse 82   Temp 99 °F (37 2 °C) (Temporal)   Resp 20   Ht 5' 4" (1 626 m)   Wt 110 kg (241 lb 13 5 oz)   SpO2 96%   BMI 41 51 kg/m²     I/Os:  I/O last 3 completed shifts:  In: -   Out: 2150 [Urine:2150]  I/O this shift:  In: 120 [P O :120]  Out: -     Lab Results and Cultures:   CBC with diff: Lab Results   Component Value Date    WBC 20 50 (H) 11/17/2022    HGB 7 5 (L) 11/17/2022    HCT 24 8 (L) 11/17/2022    MCV 89 11/17/2022     11/17/2022    MCH 27 0 11/17/2022    MCHC 30 2 (L) 11/17/2022    RDW 15 5 (H) 11/17/2022    MPV 9 3 11/17/2022    NRBC 0 11/17/2022   ,   BMP/CMP:  Lab Results   Component Value Date    SODIUM 140 11/17/2022    K 3 2 (L) 11/17/2022     11/17/2022    CO2 32 11/17/2022    BUN 12 11/17/2022    CREATININE 1 06 11/17/2022    CALCIUM 8 2 (L) 11/17/2022     (H) 11/13/2022    ALT 53 11/13/2022    ALKPHOS 232 (H) 11/13/2022    EGFR 56 11/17/2022   ,   Lipid Panel: No results found for: CHOL,   Coags:   Lab Results   Component Value Date    PTT 71 (H) 11/17/2022    INR 3 70 (H) 11/11/2022   ,     Blood Culture:   Lab Results   Component Value Date    BLOODCX No Growth After 4 Days   11/13/2022    BLOODCX Staphylococcus coagulase negative (A) 11/13/2022   ,   Urinalysis: Lab Results   Component Value Date    COLORU Yellow 11/12/2022    CLARITYU Slightly Cloudy 11/12/2022    SPECGRAV 1 025 11/12/2022    PHUR 5 5 11/12/2022    PHUR 6 5 02/23/2018    LEUKOCYTESUR Trace (A) 11/12/2022    NITRITE Positive (A) 11/12/2022    GLUCOSEU Negative 11/12/2022    KETONESU Negative 11/12/2022    BILIRUBINUR Negative 11/12/2022    BLOODU Large (A) 11/12/2022   ,   Urine Culture:   Lab Results   Component Value Date    URINECX >100,000 cfu/ml Enterobacter cloacae complex (A) 11/12/2022    URINECX 70,000-79,000 cfu/ml Kluyveromyces marxianus (A) 11/12/2022   ,   Wound Culure: No results found for: WOUNDCULT    Medications:  Current Facility-Administered Medications   Medication Dose Route Frequency   • acetaminophen (TYLENOL) tablet 650 mg  650 mg Oral Q6H PRN   • ALPRAZolam (XANAX) tablet 0 25 mg  0 25 mg Oral Daily PRN   • amLODIPine (NORVASC) tablet 10 mg  10 mg Oral Daily   • argatroban infusion (premix)  0 1-3 mcg/kg/min Intravenous Titrated   • atorvastatin (LIPITOR) tablet 40 mg  40 mg Oral Daily With Dinner   • [MAR Hold] chlorhexidine (PERIDEX) 0 12 % oral rinse 15 mL  15 mL Swish & Spit Once   • clopidogrel (PLAVIX) tablet 75 mg  75 mg Oral Daily   • doxazosin (CARDURA) tablet 2 mg  2 mg Oral Daily   • gabapentin (NEURONTIN) capsule 200 mg  200 mg Oral BID   • heparin (porcine) 2,000 Units, papaverine 60 mg in multi-electrolyte (ISOLYTE-S PH 7 4 equivalent) 500 mL irrigation   Irrigation Once   • insulin glargine (LANTUS) subcutaneous injection 15 Units 0 15 mL  15 Units Subcutaneous HS   • insulin lispro (HumaLOG) 100 units/mL subcutaneous injection 1-6 Units  1-6 Units Subcutaneous TID AC   • naloxone (NARCAN) 0 04 mg/mL syringe 0 04 mg  0 04 mg Intravenous Q1MIN PRN   • nystatin (MYCOSTATIN) powder   Topical BID   • ondansetron (ZOFRAN) injection 4 mg  4 mg Intravenous Q6H PRN   • oxyCODONE (ROXICODONE) immediate release tablet 10 mg  10 mg Oral Q4H PRN   • pantoprazole (PROTONIX) EC tablet 40 mg  40 mg Oral Early Morning   • polyethylene glycol (MIRALAX) packet 17 g  17 g Oral Daily PRN   • QUEtiapine (SEROquel) tablet 25 mg  25 mg Oral HS   • senna (SENOKOT) tablet 8 6 mg  1 tablet Oral HS PRN   • sertraline (ZOLOFT) tablet 150 mg  150 mg Oral Daily       Imaging:  Reviewed  Physical Exam:    General: Alert and oriented   In no acute distress  CV: Regular rate   Respiratory: Normal effort   Abdominal: Soft, non-distended   Extremities: Extensive tissue loss and blistering to the bilateral lower extremities, R>L Motor/sensory intact with weakness of the LLE  Neurologic: Weakness of the LLE      iMly Oreilly PA-C  11/17/2022

## 2022-11-17 NOTE — ASSESSMENT & PLAN NOTE
Uncontrolled diabetes, hypertension, hyperlipidemia  Lost to follow-up and poorly compliant to diabetic regimen  · S/p abdominal aortogram,  LLE SFA shockwave angioplasty/FATOU 10/31/22   · S/p R femoral endarterectomy 11/7  · S/p aspiration thrombectomy in right common iliac artery and left SFA  · Heparin drip transitioned to Argatroban in the setting of thrombocytopenia and concern for HIT  · Continue statin / Plavix  · Per cardiology may proceed with surgical intervention  Awaiting final schedule from vascular surgery  Tentative plan Monday versus Tuesday

## 2022-11-17 NOTE — ASSESSMENT & PLAN NOTE
Echo on 11/07 shows 67% LVEF, no diastolic dysfunction   Postprocedural hypoxia  · Currently on 2L NC

## 2022-11-17 NOTE — ASSESSMENT & PLAN NOTE
65year old female is currently admitted due to a moderate to large volume left MCA territory stroke  · Repeat CT head showed an evolving acute infarct in right parietal-temporal-occipital lobes with questionable petechial cortical hemorrhage in right parietal lobe  Discussed with Dr Cedric Turner  Recommended that we continue anticoagulation for now due to the clinical insignificance of the size and the risk of taking her off anticoagulation far outweighs the benefits off of it

## 2022-11-17 NOTE — ASSESSMENT & PLAN NOTE
UA positive, but likely contaminant rather than an actual urinary tract infection  · Appreciate ID recommendations  Monitor off antibiotics   Jacinto catheter replaced 11/16/2022

## 2022-11-17 NOTE — PROGRESS NOTES
Cardiology Progress Note   MD Matthew Calderon MD Derl Bow, DO, Mayme Kea Mansoor, MD Zachery Bourgeois, DO, Bal Arriaza DO, [facility] [place]  ----------------------------------------------------------------  [address] [address]    Ang Jeffery 65 y o  female MRN: [id number]  Unit/Bed#: E4 -01 Encounter: [id number]      ASSESSMENT:   · Preoperative CV risk assessment for vascular surgery   · PVD with nonhealing left heel wound  · w/ R ALLISON angioplasty/stent and LLE SFA angioplasty/stent  · Acute right parietal temporal up septal lobe moderate sized infarct with questionable petechial cortical hemorrhage into right parietal lobe, November 2022  · Moderate bilateral common carotid artery stenosis with moderate bilateral vertebral artery stenosis, November 2022  · Hypertension  · LVEF 60%, mild-to-moderate LVH, normal diastolic function, AV sclerosis, mild MAC, trace MR/TR w/ PASP 31 mmHg, October 2022  · Type 2 diabetes mellitus  · Acute on chronic anemia  · Depression    PLAN:  Patient is for possible femoral endarterectomy  Patient had recent CVA  She has multiple risk factors including hypertension, diabetes  She is at high CV risk for vascular procedure  As the patient is not experiencing ACS or acute decompensated heart failure or life-threatening arrhythmia, there are no absolute contraindications to surgical intervention  Patient understands that she is at high CV risk that is non modifiable  Keep potassium greater than 4 and magnesium greater than 2  Continue amlodipine, Plavix and statin  Placed on telemetry postoperatively and we will see postop  ECGs p r n  Management of argatroban as per primary service    Signed: Tila Metzger DO, MEHDI, LENNY, CICI      History of Present Illness:  Patient seen and examined  Denies chest pain, pressure, tightness or squeezing  Denies lightheadedness, dizziness or palpitations    Denies lower extremity swelling, orthopnea or paroxysmal nocturnal dyspnea  Review of Systems:  Review of Systems   Constitutional: Negative for decreased appetite, fever, weight gain and weight loss  HENT: Negative for congestion and sore throat  Eyes: Negative for visual disturbance  Cardiovascular: Negative for chest pain, dyspnea on exertion, leg swelling, near-syncope and palpitations  Respiratory: Negative for cough and shortness of breath  Endocrine: Negative for polyuria  Hematologic/Lymphatic: Negative for bleeding problem  Skin: Negative for rash  Musculoskeletal: Negative for myalgias and neck pain  Gastrointestinal: Negative for abdominal pain and nausea  Neurological: Negative for light-headedness and weakness  Psychiatric/Behavioral: Negative for depression  No Known Allergies    No current facility-administered medications on file prior to encounter       Current Outpatient Medications on File Prior to Encounter   Medication Sig   • ALPRAZolam (XANAX) 0 25 mg tablet Take 1 tablet (0 25 mg total) by mouth daily as needed for anxiety (panic attacks)   • amLODIPine (NORVASC) 10 mg tablet TAKE 1 TABLET BY MOUTH EVERY DAY   • aspirin 81 mg chewable tablet Chew 81 mg   • atorvastatin (LIPITOR) 40 mg tablet TAKE 1 TABLET BY MOUTH EVERY DAY   • clotrimazole-betamethasone (LOTRISONE) 1-0 05 % cream Apply topically 2 (two) times a day   • fluocinonide (LIDEX) 0 05 % ointment Apply topically 2 (two) times a day   • gabapentin (NEURONTIN) 100 mg capsule TAKE 2 CAPSULES BY MOUTH TWICE A DAY   • insulin aspart (NovoLOG FlexPen) 100 UNIT/ML injection pen Inject 20 Units under the skin 3 (three) times a day with meals   • insulin glargine (Lantus SoloStar) 100 units/mL injection pen Inject 60 Units under the skin daily at bedtime   • lisinopril (ZESTRIL) 40 mg tablet TAKE 1 TABLET BY MOUTH EVERY DAY   • metoprolol succinate (TOPROL-XL) 25 mg 24 hr tablet TAKE 1 TABLET (25 MG TOTAL) BY MOUTH DAILY    • Multiple Vitamin (multivitamin) tablet Take 1 tablet by mouth daily   • sertraline (ZOLOFT) 100 mg tablet TAKE 1 TABLET BY MOUTH EVERY DAY   • Trulicity 4 99 QZ/8 8GJ SOPN INJECT 0 5 ML (0 75 MG TOTAL) UNDER THE SKIN ONCE A WEEK TUESDAY   • Cholecalciferol (Vitamin D-3) 25 MCG (1000 UT) CAPS Take 2,000 Units by mouth daily (Patient not taking: Reported on 10/21/2022)   • Continuous Blood Gluc  (FreeStyle Noé 14 Day [place]) LITO Use 1 Units continuous (Patient not taking: Reported on 10/21/2022)   • Continuous Blood Gluc Sensor (FreeStyle Noé 14 Day Sensor) MISC Use daily as directed for CGM - Change every 14 days (Patient not taking: No sig reported)   • Doxylamine Succinate, Sleep, (UNISOM PO) Take by mouth (Patient not taking: No sig reported)   • glucose blood (True Metrix Blood Glucose Test) test strip Use 1 each 4 (four) times a day (Patient not taking: No sig reported)   • Insulin Pen Needle (B-D UF III MINI PEN NEEDLES) 31G X 5 MM MISC USE WITH INSULIN FOUR TIMES DAILY   • Lancets 28G MISC Use 1 each 4 (four) times a day (Patient not taking: No sig reported)   • pantoprazole (PROTONIX) 40 mg tablet Take 1 tablet (40 mg total) by mouth daily for 14 days   • potassium chloride (K-DUR,KLOR-CON) 10 mEq tablet Take 2 tablets (20 mEq total) by mouth 2 (two) times a day for 2 doses        Current Facility-Administered Medications   Medication Dose Route Frequency Provider Last Rate   • acetaminophen  650 mg Oral Q6H PRN Shae Mccauley MD     • ALPRAZolam  0 25 mg Oral Daily PRN Paul Romero MD     • amLODIPine  10 mg Oral Daily Shae Flores MD     • argatroban  0 1-3 mcg/kg/min Intravenous Titrated Paul Romero MD Stopped (11/17/22 9949)   • atorvastatin  40 mg Oral Daily With Aamir Sales MD     • [facility] Hold] chlorhexidine  15 mL Swish & Spit Once Marcelino Boehringer, DO     • clopidogrel  75 mg Oral Daily Shae Luciano MD     • doxazosin  2 mg Oral Daily Agustina Morris MD     • gabapentin  200 mg Oral BID Agustina Morris MD     • heparin 2000 units and papaverine 60 mg in isolyte equivalent 500 mL irrigation bag   Irrigation Once Maryam Enrique MD     • insulin glargine  15 Units Subcutaneous HS Agustina Morris MD     • insulin lispro  1-6 Units Subcutaneous TID AC Agustina Morris MD     • naloxone  0 04 mg Intravenous Q1MIN PRN Agustina Morris MD     • nystatin   Topical BID Agustina Morris MD     • ondansetron  4 mg Intravenous Q6H PRN Agustina Morris MD     • oxyCODONE  10 mg Oral Q4H PRN Agustina Morris MD     • pantoprazole  40 mg Oral Early Morning Agustina Morris MD     • polyethylene glycol  17 g Oral Daily PRN Agustina Morris MD     • QUEtiapine  25 mg Oral HS Agustina Morris MD     • senna  1 tablet Oral HS PRN Agustina Morris MD     • sertraline  150 mg Oral Daily Agustina Morris MD         argatroban, 0 1-3 mcg/kg/min, Last Rate: Stopped (11/17/22 0728)        Vitals:    11/16/22 1922 11/16/22 2341 11/17/22 0315 11/17/22 0600   BP: 151/94 151/67 145/73    BP Location: Right arm Right arm Right arm    Pulse: 97 83 82    Resp: 20 18 20    Temp: (!) 101 4 °F (38 6 °C) 98 4 °F (36 9 °C) 98 5 °F (36 9 °C)    TempSrc: Oral Temporal Temporal    SpO2: 93% 96% 98%    Weight:    110 kg (241 lb 13 5 oz)   Height:         Body mass index is 41 51 kg/m²        Intake/Output Summary (Last 24 hours) at 11/17/2022 0740  Last data filed at 11/17/2022 0225  Gross per 24 hour   Intake --   Output 1875 ml   Net -1875 ml       Weight change: 0 3 kg (10 6 oz)    PHYSICAL EXAMINATION:  Gen: Awake, Alert, NAD  Head/eyes: AT/NC, pupils equal and round, Anicteric  ENT: mmm  Neck: Supple, No elevated JVP, trachea midline  Resp: CTA bilaterally no w/r/r  CV: RRR +S1, S2, No m/r/g  Abd: Soft, NT/ND + BS  Ext: no LE edema bilaterally; bilateral leg wounds  Neuro: Follows commands, moves all extermities  Psych: Appropriate affect, normal mood, pleasant attitude, non-combative  Skin: warm; no rash, erythema or venous stasis changes on exposed skin    Lab Results:  Results from last 7 days   Lab Units 11/17/22  0504   WBC Thousand/uL 20 50*   HEMOGLOBIN g/dL 7 5*   HEMATOCRIT % 24 8*   PLATELETS Thousands/uL 382     Results from last 7 days   Lab Units 11/17/22  0504 11/14/22  0619 11/13/22  0148   POTASSIUM mmol/L 3 2*   < > 3 1*   CHLORIDE mmol/L 102   < > 102   CO2 mmol/L 32   < > 31   BUN mg/dL 12   < > 16   CREATININE mg/dL 1 06   < > 1 08   CALCIUM mg/dL 8 2*   < > 7 8*   ALK PHOS U/L  --   --  232*   ALT U/L  --   --  53   AST U/L  --   --  114*    < > = values in this interval not displayed  No results found for: TROPONINT      Results from last 7 days   Lab Units 11/14/22  0619   CK TOTAL U/L 2,728*   CK MB INDEX % <1 0     Results from last 7 days   Lab Units 11/11/22  1918   INR  3 70*       Tele: SR    This note was completed in part utilizing [employer] Software  Grammatical errors, random word insertions, spelling mistakes, and incomplete sentences may be an occasional consequence of this system secondary to software limitations, ambient noise, and hardware issues  If you have any questions or concerns about the content, text, or information contained within the body of this dictation, please contact the provider for clarification

## 2022-11-17 NOTE — PROGRESS NOTES
Progress Note - Infectious Disease   Jessica Dubon 65 y o  female MRN: [id number]  Unit/Bed#: E4 -01 Encounter: [id number]    Impression/Plan:  1  Recurring fever  Patient again with temperature of 101 4 overnight  She offers no new symptoms  I am concerned that patient may be reacting to ongoing leg issues  She would benefit from dressing removal and reassessment by the vascular and wound management teams  Lower extremity amputation surgery discussion had been placed on hold given new stroke  Neurology now signed off  Will need to confirm what other clearances patient requires before surgery can be reconsidered    -monitor patient off antibiotics  -check CBCD and BMP tomorrow  -monitor vitals  -recommend reassessment of B/L LE wounds by vascular surgery and wound management    2  Asymptomatic bacteriuria  Urine culture added onto previous nephrology UA collected on 11/12/2022 after patient developed fever on 11/13/2022  There was growth of enterobacter and she was given Ceftriaxone  I have low suspicion this finding represents active UTI  Fever likely reactive to emboli as patient stroked later that day, vs ongoing leg issues  Also, her persistent leukocytosis is likely reactive to her ongoing leg issues  Given low suspicion for acute UTI antibiotic treatment was discontinued  Pa was exchanged   -monitor patient off antibiotics  -monitor CBCD and BMP  -serial exams and care of pa catheter  -monitor for  symptoms  -monitor urine output     3  Coag negative staph bacteremia  Staph epidermidis present in one set of blood cultures drawn on 11/13/2022  Suspect this is a contaminant and not representative of active bacteremia  Patient has had TTE during neurology stroke work up and there was no evidence of valvular vegetation/endocarditis  No indication for repeat blood cultures or additional work up at this time      4  Stroke   CT head imaging revealing acute R hemispheric stroke on 11/13/2022 after patient experienced change in mental status  Neurology was following  Stroke likely embolic in nature  CTA demonstrated moderate stenosis of the bilateral ICAs and vertebrals  No vegetations noted on TTE  Current blood cultures not reflective of active bacteremia    -serial neuro exams  -monitor mood and mental status  -supportive care     5  Severe PAD with multiple B/L LE atheroembolic events and blistering wounds  Patient is following with both podiatry and vascular surgery  Wound management is also following  Salvageability of the B/L LE is still being questioned but she will likely require B/L amputations  I suspect patient's lower extremities have been contributing to ongoing leukocytosis  -continue follow up with wound management  -continue follow up with podiatry  -continue follow up with vascular surgery     6  Type 2 diabetes mellitus with long term insulin use  Patient's last HbA1c was 9 8% on 10/25/2022  Elevated blood glucose is risk factor for wounds and infection  Recommend tight glycemic control   -blood glucose management per primary service      7  Morbid obesity  BMI = 41 40  Above plan was discussed in detail with patient at the bedside  Above plan was discussed in detail with SLIM  Antibiotics:  No current antibiotic use    Subjective:  Patient reports her legs are very painful today  She would like to have visitors because she tells me it distracts her from the pain  She reports feeling cold overnight but then waking up sweating  She has no nausea, vomiting, or diarrhea, or dysuria; no cough, shortness of breath, or chest pain  Has no concerns with her pa  No new symptoms      Objective:  Vitals:  Temp:  [98 °F (36 7 °C)-101 4 °F (38 6 °C)] 98 5 °F (36 9 °C)  HR:  [82-97] 82  Resp:  [18-20] 20  BP: (124-165)/(61-94) 145/73  SpO2:  [90 %-98 %] 98 %  Temp (24hrs), Av 1 °F (37 3 °C), Min:98 °F (36 7 °C), Max:101 4 °F (38 6 °C)  Current: Temperature: 98 5 °F (36 9 °C)    Physical Exam:   General Appearance:  Alert, interactive with less confusion than yesterday, nontoxic, no acute distress  She appears comfortable sitting up in bed having breakfast    Throat: Oropharynx moist without lesions  Lungs:   Clear to auscultation bilaterally; no wheezes, rhonchi or rales; respirations unlabored on room air  Heart:  RRR; no murmur, rub or gallop   Abdomen:   Soft, obese, non-tender, non-distended, positive bowel sounds  Genitourinary: Jaicnto intact, urine output light yellow without sediment  Extremities: (recommend removal of leg dressings for full skin assessment)   Skin: No new rashes noted on exposed skin  Labs, Imaging, & Other studies:   All pertinent labs and imaging studies were personally reviewed  Results from last 7 days   Lab Units 11/17/22  0504 11/16/22  0904 11/15/22  0559   WBC Thousand/uL 20 50* 14 76* 15 62*   HEMOGLOBIN g/dL 7 5* 7 5* 6 4*   PLATELETS Thousands/uL 382 277 193     Results from last 7 days   Lab Units 11/17/22  0504 11/14/22  0619 11/13/22  0148 11/12/22  0424 11/11/22  0435   POTASSIUM mmol/L 3 2*   < > 3 1* 3 4* 3 3*   CHLORIDE mmol/L 102   < > 102 103 103   CO2 mmol/L 32   < > 31 29 28   BUN mg/dL 12   < > 16 17 18   CREATININE mg/dL 1 06   < > 1 08 1 03 1 00   EGFR ml/min/1 73sq m 56   < > 55 58 60   CALCIUM mg/dL 8 2*   < > 7 8* 7 9* 7 8*   AST U/L  --   --  114* 95* 98*   ALT U/L  --   --  53 46 44   ALK PHOS U/L  --   --  232* 102 112    < > = values in this interval not displayed  Results from last 7 days   Lab Units 11/13/22  0109 11/12/22  1625   BLOOD CULTURE  No Growth at 72 hrs    Staphylococcus coagulase negative*  --    GRAM STAIN RESULT  Gram positive cocci in clusters*  --    URINE CULTURE   --  >100,000 cfu/ml Enterobacter cloacae complex*  70,000-79,000 cfu/ml Yeast*     Results from last 7 days   Lab Units 11/15/22  0559 11/14/22  0619 11/13/22  0148   PROCALCITONIN ng/ml 0 93* 1 00* 0 48*         Results from last 7 days   Lab Units 11/15/22  0559   FERRITIN ng/mL 824*     Results from last 7 days   Lab Units 11/11/22  1918   D-DIMER QUANTITATIVE ug/ml FEU 3 19*

## 2022-11-17 NOTE — PLAN OF CARE
Problem: Potential for Falls  Goal: Patient will remain free of falls  Description: INTERVENTIONS:  - Educate patient/family on patient safety including physical limitations  - Instruct patient to call for assistance with activity   - Consult OT/PT to assist with strengthening/mobility   - Keep Call bell within reach  - Keep bed low and locked with side rails adjusted as appropriate  - Keep care items and personal belongings within reach  - Initiate and maintain comfort rounds  - Make Fall Risk Sign visible to staff  - Offer Toileting every 2 Hours, in advance of need  - Initiate/Maintain bed/chair alarm  - Obtain necessary fall risk management equipment: bed/chair alarm, call bell, gripper sock, walker, bedside commode  - Apply yellow socks and bracelet for high fall risk patients  - Consider moving patient to room near nurses station  Outcome: Progressing     Problem: MOBILITY - ADULT  Goal: Maintain or return to baseline ADL function  Description: INTERVENTIONS:  -  Assess patient's ability to carry out ADLs; assess patient's baseline for ADL function and identify physical deficits which impact ability to perform ADLs (bathing, care of mouth/teeth, toileting, grooming, dressing, etc )  - Assess/evaluate cause of self-care deficits   - Assess range of motion  - Assess patient's mobility; develop plan if impaired  - Assess patient's need for assistive devices and provide as appropriate  - Encourage maximum independence but intervene and supervise when necessary  - Involve family in performance of ADLs  - Assess for home care needs following discharge   - Consider OT consult to assist with ADL evaluation and planning for discharge  - Provide patient education as appropriate  Outcome: Progressing  Goal: Maintains/Returns to pre admission functional level  Description: INTERVENTIONS:  - Perform BMAT or MOVE assessment daily    - Set and communicate daily mobility goal to care team and patient/family/caregiver     - Collaborate with rehabilitation services on mobility goals if consulted  - Assist patient in repositioning every 2 hours    - Dangle patient 3 times a day  - Stand patient 3 times a day  - Out of bed to chair as tolerated  - Out of bed for meals  - Out of bed for toileting  - Record patient progress and toleration of activity level   Outcome: Progressing     Problem: PAIN - ADULT  Goal: Verbalizes/displays adequate comfort level or baseline comfort level  Description: Interventions:  - Encourage patient to monitor pain and request assistance  - Assess pain using appropriate pain scale  - Administer analgesics based on type and severity of pain and evaluate response  - Implement non-pharmacological measures as appropriate and evaluate response  - Consider cultural and social influences on pain and pain management  - Notify physician/advanced practitioner if interventions unsuccessful or patient reports new pain  Outcome: Progressing     Problem: INFECTION - ADULT  Goal: Absence or prevention of progression during hospitalization  Description: INTERVENTIONS:  - Assess and monitor for signs and symptoms of infection  - Monitor lab/diagnostic results  - Monitor all insertion sites, i e  indwelling lines, tubes, and drains  - Administer medications as ordered  - Instruct and encourage patient and family to use good hand hygiene technique  Outcome: Progressing     Problem: DISCHARGE PLANNING  Goal: Discharge to home or other facility with appropriate resources  Description: INTERVENTIONS:  - Identify barriers to discharge w/patient   - Arrange for needed discharge resources and transportation as appropriate  - Identify discharge learning needs  - Refer to Case Management Department for coordinating discharge planning if the patient needs post-hospital services based on physician/advanced practitioner order   Outcome: Progressing     Problem: Knowledge Deficit  Goal: Patient/family/caregiver demonstrates understanding of disease process, treatment plan, medications, and discharge instructions  Description: Complete learning assessment and assess knowledge base    Interventions:  - Provide teaching at level of understanding  - Provide teaching via preferred learning methods  Outcome: Progressing     Problem: METABOLIC, FLUID AND ELECTROLYTES - ADULT  Goal: Electrolytes maintained within normal limits  Description: INTERVENTIONS:  - Monitor labs and assess patient for signs and symptoms of electrolyte imbalances  - Administer electrolyte replacement as ordered  - Monitor response to electrolyte replacements, including repeat lab results as appropriate  - Instruct patient on fluid and nutrition as appropriate  Outcome: Progressing  Goal: Fluid balance maintained  Description: INTERVENTIONS:  - Monitor labs   - Monitor I/O and WT  - Instruct patient on fluid and nutrition as appropriate  - Assess for signs & symptoms of volume excess or deficit  Outcome: Progressing  Goal: Glucose maintained within target range  Description: INTERVENTIONS:  - Monitor Blood Glucose as ordered  - Assess for signs and symptoms of hyperglycemia and hypoglycemia  - Administer ordered medications to maintain glucose within target range  - Assess nutritional intake and initiate nutrition service referral as needed  Outcome: Progressing     Problem: SKIN/TISSUE INTEGRITY - ADULT  Goal: Skin Integrity remains intact(Skin Breakdown Prevention)  Description: Assess:  -Perform Nicanor assessment every shift  -Clean and moisturize skin every shift  -Inspect skin when repositioning, toileting, and assisting with ADLS  -Assess under medical devices   -Assess extremities for adequate circulation and sensation     Bed Management:  -Have minimal linens on bed & keep smooth, unwrinkled  -Change linens as needed when moist or perspiring  -Avoid sitting or lying in one position for more than 2 hours while in bed    Toileting:  -Offer bedside commode  -Assess for incontinence every 2 hours  -Use incontinent care products after each incontinent episode     Activity:  -Mobilize patient 3 times a day  -Encourage or provide ROM exercises   -Turn and reposition patient every 2 Hours  -Use appropriate equipment to lift or move patient in bed  -Instruct/ Assist with weight shifting every 15 minutes when out of bed in chair  -Consider limitation of chair time 1 hour intervals    Skin Care:  -Avoid use of baby powder, tape, friction and shearing, hot water or constrictive clothing  -Relieve pressure over bony prominences using waffle cushion when in chair  -Do not massage red bony areas    Outcome: Progressing  Goal: Incision(s), wounds(s) or drain site(s) healing without S/S of infection  Description: INTERVENTIONS  - Assess and document dressing, incision, wound bed, drain sites and surrounding tissue  - Provide patient and family education  - Perform skin care/dressing changes everyday as ordered  Outcome: Progressing  Goal: Pressure injury heals and does not worsen  Description: Interventions:  - Implement low air loss mattress or specialty surface (Criteria met)  - Apply silicone foam dressing  - Instruct/assist with weight shifting every 15 minutes when in chair   - Limit chair time to 1 hour intervals  - Use special pressure reducing interventions such as waffle cushion when in chair   - Perform passive or active ROM   - Turn and reposition patient & offload bony prominences every 2 hours   - Utilize friction reducing device or surface for transfers   - Consider consults to  interdisciplinary teams such as wound care, PT/OT  - Use incontinent care products after each incontinent episode   - Consider nutrition services referral as needed  Outcome: Progressing     Problem: MUSCULOSKELETAL - ADULT  Goal: Maintain or return mobility to safest level of function  Description: INTERVENTIONS:  - Assess patient's ability to carry out ADLs; assess patient's baseline for ADL function and identify physical deficits which impact ability to perform ADLs (bathing, care of mouth/teeth, toileting, grooming, dressing, etc )  - Assess/evaluate cause of self-care deficits   - Assess range of motion  - Assess patient's mobility  - Assess patient's need for assistive devices and provide as appropriate  - Encourage maximum independence but intervene and supervise when necessary  - Involve family in performance of ADLs  - Assess for home care needs following discharge   - Consider OT consult to assist with ADL evaluation and planning for discharge  - Provide patient education as appropriate  Outcome: Progressing  Goal: Maintain proper alignment of affected body part  Description: INTERVENTIONS:  - Support, maintain and protect limb and body alignment  - Provide patient/ family with appropriate education  Outcome: Progressing     Problem: CARDIOVASCULAR - ADULT  Goal: Maintains optimal cardiac output and hemodynamic stability  Description: INTERVENTIONS:  - Monitor I/O, vital signs and rhythm  - Monitor for S/S and trends of decreased cardiac output  - Administer and titrate ordered vasoactive medications to optimize hemodynamic stability  - Assess quality of pulses, skin color and temperature  - Assess for signs of decreased coronary artery perfusion  - Instruct patient to report change in severity of symptoms  Outcome: Progressing     Problem: RESPIRATORY - ADULT  Goal: Achieves optimal ventilation and oxygenation  Description: INTERVENTIONS:  - Assess for changes in respiratory status  - Assess for changes in mentation and behavior  - Position to facilitate oxygenation and minimize respiratory effort  - Oxygen administered by appropriate delivery if ordered  - Initiate smoking cessation education as indicated  - Encourage broncho-pulmonary hygiene including cough, deep breathe, Incentive Spirometry  - Assess the need for suctioning and aspirate as needed  - Assess and instruct to report SOB or any respiratory difficulty  - Respiratory Therapy support as indicated  Outcome: Progressing     Problem: Prexisting or High Potential for Compromised Skin Integrity  Goal: Skin integrity is maintained or improved  Description: INTERVENTIONS:  - Identify patients at risk for skin breakdown  - Assess and monitor skin integrity  - Assess and monitor nutrition and hydration status  - Monitor labs   - Assess for incontinence   - Turn and reposition patient  - Assist with mobility/ambulation  - Relieve pressure over bony prominences  - Avoid friction and shearing  - Provide appropriate hygiene as needed including keeping skin clean and dry  - Evaluate need for skin moisturizer/barrier cream  - Collaborate with interdisciplinary team   - Patient/family teaching  - Consider wound care consult   Outcome: Progressing     Problem: Nutrition/Hydration-ADULT  Goal: Nutrient/Hydration intake appropriate for improving, restoring or maintaining nutritional needs  Description: Monitor and assess patient's nutrition/hydration status for malnutrition  Collaborate with interdisciplinary team and initiate plan and interventions as ordered  Monitor patient's weight and dietary intake as ordered or per policy  Utilize nutrition screening tool and intervene as necessary  Determine patient's food preferences and provide high-protein, high-caloric foods as appropriate       INTERVENTIONS:  - Monitor oral intake, urinary output, labs, and treatment plans  - Assess nutrition and hydration status and recommend course of action  - Evaluate amount of meals eaten  - Assist patient with eating if necessary   - Allow adequate time for meals  - Recommend/ encourage appropriate diets, oral nutritional supplements, and vitamin/mineral supplements  - Order, calculate, and assess calorie counts as needed  - Recommend, monitor, and adjust tube feedings and TPN/PPN based on assessed needs  - Assess need for intravenous fluids  - Provide specific nutrition/hydration education as appropriate  - Include patient/family/caregiver in decisions related to nutrition  Outcome: Progressing

## 2022-11-17 NOTE — PROGRESS NOTES
[facility]  Progress Note - [address] 1960, 65 y o  female MRN: [id number]  Unit/Bed#: E4 -01 Encounter: [id number]  Primary Care Provider: ALEX Alberto   Date and time admitted to hospital: 10/21/2022  7:58 PM    * Stroke [facility])  Assessment & Plan  65year old female is currently admitted due to a moderate to large volume left MCA territory stroke  · Repeat CT head showed an evolving acute infarct in right parietal-temporal-occipital lobes with questionable petechial cortical hemorrhage in right parietal lobe  Discussed with Dr Adrien Walter  Recommended that we continue anticoagulation for now due to the clinical insignificance of the size and the risk of taking her off anticoagulation far outweighs the benefits off of it  PAD (peripheral artery disease) ([facility])  Assessment & Plan  Uncontrolled diabetes, hypertension, hyperlipidemia  Lost to follow-up and poorly compliant to diabetic regimen  · S/p abdominal aortogram,  LLE SFA shockwave angioplasty/FATOU 10/31/22   · S/p R femoral endarterectomy 11/7  · S/p aspiration thrombectomy in right common iliac artery and left SFA  · Heparin drip transitioned to Argatroban in the setting of thrombocytopenia and concern for HIT  · Continue statin / Plavix  · Per cardiology may proceed with surgical intervention  Awaiting final schedule from vascular surgery  Tentative plan Monday versus Tuesday  UTI (urinary tract infection)  Assessment & Plan  UA positive, but likely contaminant rather than an actual urinary tract infection  · Appreciate ID recommendations  Monitor off antibiotics   Jacitno catheter replaced 11/16/2022    Thrombocytopenia ([address] )  Assessment & Plan  Possibly due to HIT  · Awaiting heparin antibody by serotonin release  · Heparin induced antibody elevated    Recent Labs     11/15/22  0559 11/16/22  0904 11/17/22  0504    277 [id number]         Diabetic ulcer of heel associated with diabetes mellitus due HOSPITALIST PROGRESS NOTE:    Date of Admit: 9/11/2017 11:16 PM  Date of Service: 9/13/2017    Chief Complaint: admitted with abnormal labs.     Subjective: Overnight events reviewed. Blood sugar was running low. Yesterday evening and this morning, patient was symptomatic. Did not receive the mealtime insulin yesterday before dinner and this morning. Patient refused Lantus this morning. Leukocytosis appears to be getting worse, but patient denies any fever, chills, sore throat, cough, shortness of breath, nausea, vomiting, diarrhea or dysuria. No obvious skin infection noted. Patient wants to go home today.    Review of Systems:  CONSTITUTIONAL: Denies fever, chills, rigors  CV:  Denies chest pain, palpitations   RESPIRATORY: Denies cough and SOB.   GI:  Denies abdominal pain/cramping, nausea/vomiting, diarrhea  : Denies frequency, dysuria and urgency.     Vital 24 Hour Range Last Value   Temperature Temp  Min: 97.8 °F (36.6 °C)  Max: 97.8 °F (36.6 °C) 97.8 °F (36.6 °C) (09/12/17 2345)   Pulse Pulse  Min: 60  Max: 61 60 (09/12/17 2345)   Respiratory Resp  Min: 16  Max: 20 20 (09/12/17 2345)   Non-Invasive  Blood Pressure BP  Min: 121/66  Max: 148/82 148/82 (09/13/17 0744)   Pulse Oximetry SpO2  Min: 94 %  Max: 98 % 94 % (09/12/17 2345)     General appearance- Alert, no acute distress  HEENT - normocephalic, PERRL, conjunctiva clear  Neck   - Supple, no JVD   Lungs- Clear to auscultation bilaterally, respirations unlabored ,no wheezing   Heart - Regular rate and rhythm, S1 and S2 normal, no murmur, rub or gallop   Abdomen - Soft,  bowel sounds active all four quadrants, no hepatosplenomegaly , non tender no guarding or                                                   rigidity  Extremities- no significant peripheral  edema   Neurologic - CNII-XII intact, no focal motor deficits noted.    Laboratory Results:    Recent Labs  Lab 09/13/17  0539   SODIUM 141   POTASSIUM 5.2*   CHLORIDE 106   CO2 28   BUN 46*    CREATININE 2.27*   GLUCOSE 95   WBC 26.2*   HGB 13.1   HCT 41.9          No results found      Medications/Infusions:  Scheduled:   • sodium chloride (PF)  2 mL Injection 2 times per day   • furosemide  20 mg Oral Daily   • insulin lispro   Subcutaneous TID AC   • insulin glargine  60 Units Subcutaneous 2 times per day   • insulin lispro   Subcutaneous Nightly   • metoPROLOL  50 mg Oral 2 times per day   • isosorbide mononitrate  60 mg Oral Daily   • gemfibrozil  600 mg Oral BID AC   • rOPINIRole  1 mg Oral Nightly   • brimonidine  1 drop Right Eye BID   • atorvastatin  80 mg Oral Nightly   • latanoprost  1 drop Right Eye Nightly   • ranolazine  1,000 mg Oral BID   • aspirin  81 mg Oral Daily   • gabapentin  600 mg Oral QAM   • gabapentin  1,200 mg Oral Q Evening       Continuous Infusions:   • dextrose 5 % infusion       Cardiac cath( 5/2016)  CORONARY ANATOMY:  1. Left main: Normal  2. Left anterior descending: There is mild disease in the LAD.  The first diagonal artery has diffuse moderate-severe disease  3. Circumflex: Mild disease  4. Right coronary artery: Mild disease in the RCA.  The rPDA is occluded but well collateralized from the left system   CONCLUSION:  Pt is a 58 yo gentleman who presented for coronary angiogram. He was found to have diffuse small vessel CAD.  There is nothing amenable to intervention at this time.     Myocardial pefusion scan( 01/2017)  IMPRESSION:   1. Definitely myocardial perfusion scans at rest and following a  regadenoson injection    2. Left ventricular function is normal at rest and following a regadenoson  injection   3. Cardiac Risk Assessment:High   4. No previous study for comparison.    Assessment and Plan:    Hyperkalemia-unclear etiology.  Likely ARB induced, chronic renal insufficiency contributing.  EKG does not show any acute T-wave changes.  Received insulin/D50 in ED, kayexalate x 1, on Lasix  repeat potassium levels are better  Stopped   to underlying condition [facility])  Assessment & Plan  · Vascular surgery planning bilateral BKA  Awaiting intervention date        Acute on chronic anemia  Assessment & Plan  S/p 2U pRBC  Once intervention date has been set, will likely have to transfuse her to 8 0 per my discussion with vascular surgery today  Recent Labs     11/16/22  0904 11/17/22  0504   HGB 7 5* 7 5*         Hypokalemia  Assessment & Plan  Likely due to GI losses  Resolved  · Continue repleting PRN    Recent Labs     11/15/22  0559 11/16/22  0904 11/17/22  0504   K 3 5 3 5 3 2*   MG 1 8  --  1 8         Type 2 diabetes mellitus with hyperglycemia, with long-term current use of insulin [facility])  Assessment & Plan  Lab Results   Component Value Date    HGBA1C 9 8 (H) 10/25/2022     Recent Labs     11/16/22  2055 11/17/22  0752 11/17/22  1118 11/17/22  1546   POCGLU 148* 135 159* 187*     · Inpatient regimen: Sliding scale, Glargine 15U HS    Benign essential hypertension  Assessment & Plan  Above goal  · Continue amlodipine, Cardura    Depression, recurrent (HCC)  Assessment & Plan  Evaluated by Psychiatry on this admission  · Continue Zoloft 150 mg daily / Quetiapine 25mg po Qhs    Acute respiratory failure with hypoxia ([address] )  Assessment & Plan  Echo on 11/07 shows 72% LVEF, no diastolic dysfunction  Postprocedural hypoxia  · Currently on 2L NC      VTE Pharmacologic Prophylaxis:   Pharmacologic: Argatroban  Mechanical VTE Prophylaxis in Place: Yes    Discussions with Specialists or Other Care Team Provider: nursing    Education and Discussions with Family / Patient: patient    Time Spent for Care: 30 minutes  More than 50% of total time spent on counseling and coordination of care as described above      Current Length of Stay: 26 day(s)    Current Patient Status: Inpatient   Certification Statement: The patient will continue to require additional inpatient hospital stay due to vascular intervention    Discharge Plan: once surgery has been losartan.  Nephrology following   Monitor      CKD stage III with progression  Baseline creatinine around 1.9.  Creatinine on admission-2.25, stable currently  Nephrology consulted - ordered serology to r/o GN, renal US   Monitor, avoid/cautious use of nephrotoxic agents     Benign essential hypertension-fairly controlled  Continue imdur, metoprolol with hold parameters  Holding losartan     Leukocytosis-unclear etiology.  Getting worse , still No clinical symptoms or signs of infection.  Chest x-ray negative for pneumonia, UA negative for UTI.  Consulted ID      Insulin-dependent type 2 diabetes mellitus-suboptimal control on admission  Diabetic neuropathy and nephropathy  BS now tightly regulated , likely from dietary modification   Decreased lantus to 30 units qhs and novolog 4 units TID with correction  Continue gabapentin  Moderate consistent carb diet.  Monitor blood sugar q.h.s. Lasix.     Mild nonobstructive, microvascular CAD.  Chronic anginal symptoms from above.  Continue aspirin, beta blockers, statin, imdur, Ranexa  Tele monitoring.     Obstructive sleep apnea-  CPAP      Hyperlipidemia- continue statin     DVT prophylaxis- SCD, heparin.     dispo- patient wants to go home today. Workup for hyperkalemia and CK D progression in progress. Patient is aware that his WBC count is getting worse, etiology of this is unclear. He is willing to stay until ID evaluation.     Goals of Care:  Patient is decisional: Yes  Patient has POA documents in the chart: No  Code Status: Full Code  Rationale behind code status: pt's preference  Goals of care: standard medical care    -------------------------------------------------------------------------------------------------------------------  Case discussed with:  Patient, RN and MD    Personally Reviewed Pertinent: Radiology and Labs    Hospital Day #: Hospital Day: 3    Tentative discharge Disposition: Home       Signed:  Prince MARTHA Perez MD  9/13/2017  8:29 AM   performed    Code Status: Level 1 - Full Code      Subjective:   Patient seen and examined at bedside  Febrile episode overnight  Objective:     Vitals:   Temp (24hrs), Av °F (37 2 °C), Min:98 1 °F (36 7 °C), Max:101 4 °F (38 6 °C)    Temp:  [98 1 °F (36 7 °C)-101 4 °F (38 6 °C)] 98 7 °F (37 1 °C)  HR:  [82-97] 92  Resp:  [18-20] 20  BP: (132-151)/(66-94) 151/79  SpO2:  [92 %-98 %] 92 %  Body mass index is 41 51 kg/m²  Input and Output Summary (last 24 hours): Intake/Output Summary (Last 24 hours) at 2022 1804  Last data filed at 2022 1430  Gross per 24 hour   Intake 120 ml   Output 1750 ml   Net -1630 ml       Physical Exam:     Physical Exam  Vitals reviewed  Constitutional:       General: She is not in acute distress  HENT:      Head: Normocephalic  Nose: Nose normal       Mouth/Throat:      Mouth: Mucous membranes are moist    Eyes:      General: No scleral icterus  Cardiovascular:      Rate and Rhythm: Normal rate  Pulmonary:      Effort: Pulmonary effort is normal  No respiratory distress  Abdominal:      General: There is no distension  Palpations: Abdomen is soft  Tenderness: There is no abdominal tenderness  Musculoskeletal:      Comments: Wounds / blistering BLLE   Skin:     General: Skin is warm  Neurological:      Mental Status: She is alert  Mental status is at baseline     Psychiatric:         Mood and Affect: Mood normal          Behavior: Behavior normal        Additional Data:     Labs:    Results from last 7 days   Lab Units 22  0504 11/15/22  0559 22  1049 22  0148   WBC Thousand/uL 20 50*   < > 16 57* 15 00*   HEMOGLOBIN g/dL 7 5*   < > 7 0* 7 2*   HEMATOCRIT % 24 8*   < > 21 9* 22 8*   PLATELETS Thousands/uL 382   < > 158 67*   NEUTROS PCT %  --   --   --  84*   LYMPHS PCT %  --   --   --  5*   LYMPHO PCT %  --   --  4*  --    MONOS PCT %  --   --   --  9   MONO PCT %  --   --  3*  --    EOS PCT %  --   --  1 1    < > = values in this interval not displayed  Results from last 7 days   Lab Units 11/17/22  0504 11/14/22  0619 11/13/22  0148   SODIUM mmol/L 140   < > 140   POTASSIUM mmol/L 3 2*   < > 3 1*   CHLORIDE mmol/L 102   < > 102   CO2 mmol/L 32   < > 31   BUN mg/dL 12   < > 16   CREATININE mg/dL 1 06   < > 1 08   ANION GAP mmol/L 6   < > 7   CALCIUM mg/dL 8 2*   < > 7 8*   ALBUMIN g/dL  --   --  1 5*   TOTAL BILIRUBIN mg/dL  --   --  0 67   ALK PHOS U/L  --   --  232*   ALT U/L  --   --  53   AST U/L  --   --  114*   GLUCOSE RANDOM mg/dL 126   < > 85    < > = values in this interval not displayed  Results from last 7 days   Lab Units 11/11/22  1918   INR  3 70*     Results from last 7 days   Lab Units 11/17/22  1546 11/17/22  1118 11/17/22  0752 11/16/22  2055 11/16/22  1600 11/16/22  1115 11/16/22  0725 11/15/22  2117 11/15/22  1607 11/15/22  1051 11/15/22  0735 11/14/22  2157   POC GLUCOSE mg/dl 187* 159* 135 148* 153* 154* 143* 179* 134 163* 132 204*         Results from last 7 days   Lab Units 11/15/22  0559 11/14/22  0619 11/13/22  0148   LACTIC ACID mmol/L  --   --  1 0   PROCALCITONIN ng/ml 0 93* 1 00* 0 48*           * I Have Reviewed All Lab Data Listed Above  * Additional Pertinent Lab Tests Reviewed: [address] Admission Reviewed      Lines:  Invasive Devices     Peripheral Intravenous Line  Duration           Peripheral IV 11/15/22 Left Antecubital 2 days    Peripheral IV 11/15/22 Right Antecubital 2 days          Drain  Duration           Urethral Catheter Non-latex 18 Fr  <1 day               Imaging:    Imaging Reports Reviewed Today Include: no new imaging    Recent Cultures (last 7 days):     Results from last 7 days   Lab Units 11/13/22  0109 11/12/22  1625   BLOOD CULTURE  No Growth After 4 Days    Staphylococcus coagulase negative*  --    GRAM STAIN RESULT  Gram positive cocci in clusters*  --    URINE CULTURE   --  >100,000 cfu/ml Enterobacter cloacae complex*  70,000-79,000 cfu/ml Hillaryron hernandez*       Last 24 Hours Medication List:   Current Facility-Administered Medications   Medication Dose Route Frequency Provider Last Rate   • acetaminophen  650 mg Oral Q6H PRN Shae Curry MD     • ALPRAZolam  0 25 mg Oral Daily PRN Jessica Mirza MD     • amLODIPine  10 mg Oral Daily Shae Curry MD     • argatroban  0 1-3 mcg/kg/min Intravenous Titrated Jessica Mirza MD 0 1 mcg/kg/min (11/17/22 1205)   • atorvastatin  40 mg Oral Daily With Bertha Araujo MD     • [facility] Hold] chlorhexidine  15 mL Swish & Spit Once Niraj Gardner DO     • clopidogrel  75 mg Oral Daily Benjamín Archer MD     • doxazosin  2 mg Oral Daily Benjamín Archer MD     • gabapentin  200 mg Oral BID Benjamín Archer MD     • heparin 2000 units and papaverine 60 mg in isolyte equivalent 500 mL irrigation bag   Irrigation Once Pervis MD Lopez     • insulin glargine  15 Units Subcutaneous HS Benjamín Archer MD     • insulin lispro  1-6 Units Subcutaneous TID AC Benjamín Archer MD     • naloxone  0 04 mg Intravenous Q1MIN PRN Benjamín Archer MD     • nystatin   Topical BID Benjamín Archer MD     • ondansetron  4 mg Intravenous Q6H PRN Benjamín Archer MD     • oxyCODONE  10 mg Oral Q4H PRN Benjamín Archer MD     • pantoprazole  40 mg Oral Early Morning Benjamín Archer MD     • polyethylene glycol  17 g Oral Daily PRN Benjamín Archer MD     • potassium chloride  20 mEq Oral Q2H Jessica Mirza MD     • QUEtiapine  25 mg Oral HS Benjamín Archer MD     • senna  1 tablet Oral HS PRN ALEX Sellers     • sertraline  150 mg Oral Daily Benjamín Archer MD          Today, Patient Was Seen By: Herberth Dawson MD    ** Please Note: Dictation voice to text software may have been used in the creation of this document   **

## 2022-11-17 NOTE — CONSULTS
Please see initial consultation note completed earlier this hospitalization  Progress note to follow today, 11/17/22

## 2022-11-17 NOTE — ASSESSMENT & PLAN NOTE
Likely due to GI losses   Resolved  · Continue repleting PRN    Recent Labs     11/15/22  0559 11/16/22  0904 11/17/22  0504   K 3 5 3 5 3 2*   MG 1 8  --  1 8

## 2022-11-18 LAB
ANION GAP SERPL CALCULATED.3IONS-SCNC: 5 MMOL/L (ref 4–13)
APTT PPP: 101 SECONDS (ref 23–37)
APTT PPP: 73 SECONDS (ref 23–37)
APTT PPP: 85 SECONDS (ref 23–37)
BACTERIA BLD CULT: NORMAL
BASOPHILS # BLD AUTO: 0.07 THOUSANDS/ÂΜL (ref 0–0.1)
BASOPHILS NFR BLD AUTO: 0 % (ref 0–1)
BUN SERPL-MCNC: 10 MG/DL (ref 5–25)
CALCIUM SERPL-MCNC: 7.8 MG/DL (ref 8.3–10.1)
CHLORIDE SERPL-SCNC: 103 MMOL/L (ref 96–108)
CO2 SERPL-SCNC: 31 MMOL/L (ref 21–32)
CREAT SERPL-MCNC: 1.1 MG/DL (ref 0.6–1.3)
EOSINOPHIL # BLD AUTO: 0.32 THOUSAND/ÂΜL (ref 0–0.61)
EOSINOPHIL NFR BLD AUTO: 2 % (ref 0–6)
ERYTHROCYTE [DISTWIDTH] IN BLOOD BY AUTOMATED COUNT: 15.8 % (ref 11.6–15.1)
GFR SERPL CREATININE-BSD FRML MDRD: 54 ML/MIN/1.73SQ M
GLUCOSE SERPL-MCNC: 129 MG/DL (ref 65–140)
GLUCOSE SERPL-MCNC: 142 MG/DL (ref 65–140)
GLUCOSE SERPL-MCNC: 145 MG/DL (ref 65–140)
GLUCOSE SERPL-MCNC: 164 MG/DL (ref 65–140)
GLUCOSE SERPL-MCNC: 187 MG/DL (ref 65–140)
HCT VFR BLD AUTO: 23.1 % (ref 34.8–46.1)
HGB BLD-MCNC: 7.1 G/DL (ref 11.5–15.4)
IMM GRANULOCYTES # BLD AUTO: 0.46 THOUSAND/UL (ref 0–0.2)
IMM GRANULOCYTES NFR BLD AUTO: 2 % (ref 0–2)
LYMPHOCYTES # BLD AUTO: 1.35 THOUSANDS/ÂΜL (ref 0.6–4.47)
LYMPHOCYTES NFR BLD AUTO: 7 % (ref 14–44)
MAGNESIUM SERPL-MCNC: 1.8 MG/DL (ref 1.6–2.6)
MCH RBC QN AUTO: 27.4 PG (ref 26.8–34.3)
MCHC RBC AUTO-ENTMCNC: 30.7 G/DL (ref 31.4–37.4)
MCV RBC AUTO: 89 FL (ref 82–98)
MONOCYTES # BLD AUTO: 1.58 THOUSAND/ÂΜL (ref 0.17–1.22)
MONOCYTES NFR BLD AUTO: 8 % (ref 4–12)
NEUTROPHILS # BLD AUTO: 17.07 THOUSANDS/ÂΜL (ref 1.85–7.62)
NEUTS SEG NFR BLD AUTO: 81 % (ref 43–75)
NRBC BLD AUTO-RTO: 0 /100 WBCS
PLATELET # BLD AUTO: 384 THOUSANDS/UL (ref 149–390)
PMV BLD AUTO: 9 FL (ref 8.9–12.7)
POTASSIUM SERPL-SCNC: 3.4 MMOL/L (ref 3.5–5.3)
RBC # BLD AUTO: 2.59 MILLION/UL (ref 3.81–5.12)
SODIUM SERPL-SCNC: 139 MMOL/L (ref 135–147)
WBC # BLD AUTO: 20.85 THOUSAND/UL (ref 4.31–10.16)

## 2022-11-18 PROCEDURE — 99232 SBSQ HOSP IP/OBS MODERATE 35: CPT | Performed by: SURGERY

## 2022-11-18 PROCEDURE — 83735 ASSAY OF MAGNESIUM: CPT | Performed by: STUDENT IN AN ORGANIZED HEALTH CARE EDUCATION/TRAINING PROGRAM

## 2022-11-18 PROCEDURE — 80048 BASIC METABOLIC PNL TOTAL CA: CPT | Performed by: STUDENT IN AN ORGANIZED HEALTH CARE EDUCATION/TRAINING PROGRAM

## 2022-11-18 PROCEDURE — 85025 COMPLETE CBC W/AUTO DIFF WBC: CPT | Performed by: STUDENT IN AN ORGANIZED HEALTH CARE EDUCATION/TRAINING PROGRAM

## 2022-11-18 PROCEDURE — 02HV33Z INSERTION OF INFUSION DEVICE INTO SUPERIOR VENA CAVA, PERCUTANEOUS APPROACH: ICD-10-PCS | Performed by: STUDENT IN AN ORGANIZED HEALTH CARE EDUCATION/TRAINING PROGRAM

## 2022-11-18 PROCEDURE — 99232 SBSQ HOSP IP/OBS MODERATE 35: CPT | Performed by: INTERNAL MEDICINE

## 2022-11-18 PROCEDURE — 82948 REAGENT STRIP/BLOOD GLUCOSE: CPT

## 2022-11-18 PROCEDURE — 99232 SBSQ HOSP IP/OBS MODERATE 35: CPT | Performed by: STUDENT IN AN ORGANIZED HEALTH CARE EDUCATION/TRAINING PROGRAM

## 2022-11-18 PROCEDURE — 36569 INSJ PICC 5 YR+ W/O IMAGING: CPT

## 2022-11-18 PROCEDURE — 85730 THROMBOPLASTIN TIME PARTIAL: CPT | Performed by: STUDENT IN AN ORGANIZED HEALTH CARE EDUCATION/TRAINING PROGRAM

## 2022-11-18 PROCEDURE — C1751 CATH, INF, PER/CENT/MIDLINE: HCPCS

## 2022-11-18 RX ORDER — ACETAMINOPHEN 325 MG/1
650 TABLET ORAL EVERY 6 HOURS SCHEDULED
Status: DISCONTINUED | OUTPATIENT
Start: 2022-11-18 | End: 2022-12-02

## 2022-11-18 RX ORDER — HYDROMORPHONE HCL/PF 1 MG/ML
0.5 SYRINGE (ML) INJECTION
Status: DISCONTINUED | OUTPATIENT
Start: 2022-11-18 | End: 2022-11-28

## 2022-11-18 RX ADMIN — OXYCODONE 5 MG: 5 TABLET ORAL at 09:33

## 2022-11-18 RX ADMIN — HYDROMORPHONE HYDROCHLORIDE 0.5 MG: 1 INJECTION, SOLUTION INTRAMUSCULAR; INTRAVENOUS; SUBCUTANEOUS at 17:06

## 2022-11-18 RX ADMIN — INSULIN GLARGINE 15 UNITS: 100 INJECTION, SOLUTION SUBCUTANEOUS at 22:14

## 2022-11-18 RX ADMIN — CLOPIDOGREL BISULFATE 75 MG: 75 TABLET ORAL at 08:17

## 2022-11-18 RX ADMIN — GABAPENTIN 200 MG: 100 CAPSULE ORAL at 17:04

## 2022-11-18 RX ADMIN — SERTRALINE HYDROCHLORIDE 150 MG: 100 TABLET ORAL at 08:17

## 2022-11-18 RX ADMIN — ACETAMINOPHEN 650 MG: 325 TABLET, FILM COATED ORAL at 17:05

## 2022-11-18 RX ADMIN — ARGATROBAN 0.1 MCG/KG/MIN: 50 INJECTION, SOLUTION INTRAVENOUS at 14:24

## 2022-11-18 RX ADMIN — NYSTATIN: 100000 POWDER TOPICAL at 17:13

## 2022-11-18 RX ADMIN — QUETIAPINE FUMARATE 25 MG: 25 TABLET ORAL at 22:14

## 2022-11-18 RX ADMIN — DOXAZOSIN 2 MG: 2 TABLET ORAL at 08:17

## 2022-11-18 RX ADMIN — NYSTATIN: 100000 POWDER TOPICAL at 08:18

## 2022-11-18 RX ADMIN — AMLODIPINE BESYLATE 10 MG: 10 TABLET ORAL at 08:17

## 2022-11-18 RX ADMIN — OXYCODONE 5 MG: 5 TABLET ORAL at 14:20

## 2022-11-18 RX ADMIN — ATORVASTATIN CALCIUM 40 MG: 40 TABLET, FILM COATED ORAL at 17:05

## 2022-11-18 RX ADMIN — INSULIN LISPRO 1 UNITS: 100 INJECTION, SOLUTION INTRAVENOUS; SUBCUTANEOUS at 17:05

## 2022-11-18 RX ADMIN — GABAPENTIN 200 MG: 100 CAPSULE ORAL at 08:17

## 2022-11-18 NOTE — ASSESSMENT & PLAN NOTE
65year old female is currently admitted due to a moderate to large volume left MCA territory stroke  · Repeat CT head showed an evolving acute infarct in right parietal-temporal-occipital lobes with questionable petechial cortical hemorrhage in right parietal lobe  Discussed with Dr Precious Hillman  Recommended that we continue anticoagulation for now due to the clinical insignificance of the size and the risk of taking her off anticoagulation far outweighs the benefits off of it

## 2022-11-18 NOTE — ASSESSMENT & PLAN NOTE
Lab Results   Component Value Date    HGBA1C 9 8 (H) 10/25/2022     Recent Labs     11/17/22  1546 11/17/22 2057 11/18/22  0744 11/18/22  1103   POCGLU 187* 190* 129 145*     · Inpatient regimen: Sliding scale, Glargine 15U HS

## 2022-11-18 NOTE — ASSESSMENT & PLAN NOTE
65year old female former smoker w/HTN, HLD, uncontrolled type II DM (A1c 9 8), hx CVA, presenting with nonhealing extensive L heel ulceration and R hallux and 5th digit ulceration  Vascular surgery consulted for input  S/p multiple b/l endovascular interventions  JUSTIN  R hemispheric CVA    Recommendations:  -Bilateral tissue loss in the setting of uncontrolled type II DM and NYDIA on admission, now s/p angiogram w/intervention  Groin access is soft without hematoma or ecchymosis  -Now s/p multiple bilateral lower extremity endovascular interventions, c/b atheroembolic event to the RLE and JUSTIN with R hemispheric CVA  -Patient will require bilateral lower extremity major amputations  Would recommend holding off on the RLE to allow poor perfused superficial tissue loss to demarcate/slough to allow us to determine level of amputation  Do recommend proceeding with LLE amputation next week  -Extensive conversation with patient regarding level of LLE amputation  Could consider attempting LLE BKA with possibility of requiring more proximal amputation (revision to AKA) vs proceeding with AKA  However, patient wishes to forgo attempt at [address] and proceed with L AKA  Currently scheduled for Wednesday 11/23/22 with Dr Yusuf Martinez  -Cardiology consulted  Appreciate recommendations  -Medical management with ASA, statin, Plavix  -Medical management and glucose control per SLIM  Continue to trend Hgb  -Appreciate ID input  Continue to trend vitals  If patient becomes septic or systemically ill (presumed to be secondary to RLE), would consider guillotine amputation in the emergent setting  However will try to avoid this as long as possible   -Cont argatroban drip in setting of HIT  -Patient seen and examined with Dr Ajit Wells  Extensive discussion with patient and family (daughter, brother and sister-in-law) at bedside   All questions and concerns addressed

## 2022-11-18 NOTE — ASSESSMENT & PLAN NOTE
Echo on 11/07 shows 62% LVEF, no diastolic dysfunction   Postprocedural hypoxia  · Currently on 2L NC

## 2022-11-18 NOTE — WOUND OSTOMY CARE
Progress Note - Wound   [address] 65 y [address]  female MRN: [id number]  Unit/Bed#: E4 -01 Encounter: [id number]            Assessment Findings:   Patient seen today for wound care follow up visit  Patient is max assist with turning from side to side  Patient has indwelling pa catheter and is continent of bowel  Patient is independent with meals  Bedside nurse medicated for pain for dressing changes  It appears that the current plan is to do left AKA next week with possible right BKA in the future, vascular team is assessing to see if purple discoloration to upper right limb resolves to the point where it can be salvage  1   Left posterior ankle ischemic wound--wound of unknown etiology/diabetic ulcer  Two circular areas of brown/black dry eschar with nasreen-wound is cold to touch and purple  Wound redressed per order, podiatry/vascular is currently managing  2   Diabetic ulcer to left heel--dry black eschar with that wraps the entire heel  Periwound is dusky and purple, no drainage  Wound redressed per order, podiatry/vascular is managing  3   Right leg/lateral thigh--right lower extremity with significant erupted bulla (more medially) and purple discoloration likely related to vascular status, lighter in appearance for this assessment  Some bulla have opened revealing pink, partial thickness wound bed with moderate serosanguinous drainage  4   Right lateral foot wound--4th and 5th toe dry, black with moist desquamation along lateral foot  Dusky discoloration is noted in great and 2nd toe and on posterior foot that is cold to touch and mottled in appearance  Wound redressed per order, podiatry/vascular is managing  5  MASD/intertriginous dermatitis to anterior and left abdominal fold extending into left groin fold--resolved at this time, Nystatin powder applied       No induration, fluctuance, odor, warmth/temperature differences, redness, or purulence noted to the above noted wounds and skin areas assessed  New dressings applied per orders listed below  Bedside nurse aware of plan of care  See flow sheets for more detailed assessment findings  Wound care will continue to follow  Wound Care Plan:   1-P500 low air-loss mattress  2-Elevate heels off of bed/chair surface to offload pressure  3-Turn/reposition every 2 hours while in bed using positioning wedges for pressure re-distribution on skin  4-Bilateral foot/ankle--per podiatry and vascular team    5-Right thigh/leg--cleanse with normal saline, pat dry   Apply Adaptic and ABDs to open blisters   Wrap with rosalva   Change dressing daily   Leave intact blisters open to air  Wounds:  Wound 10/22/22 Ankle Left;Posterior (Active)   Wound Image   11/18/22 0923   Wound Description Epithelialization;Fragile;Dusky;Eschar 11/18/22 0923   Michaela-wound Assessment Edema 11/18/22 0923   Wound Length (cm) 7 cm 11/18/22 0923   Wound Width (cm) 8 cm 11/18/22 0923   Wound Depth (cm) 0 cm 11/18/22 0923   Wound Surface Area (cm^2) 56 cm^2 11/18/22 0923   Wound Volume (cm^3) 0 cm^3 11/18/22 0923   Calculated Wound Volume (cm^3) 0 cm^3 11/18/22 0923   Change in Wound Size % 100 11/18/22 0923   Tunneling 0 cm 11/18/22 0923   Tunneling in depth located at 0 11/18/22 0923   Undermining 0 11/18/22 0923   Undermining is depth extending from 0 11/18/22 0923   Wound Site Closure BRADY 11/18/22 0923   Drainage Amount None 11/18/22 0923   Drainage Description Serosanguineous 11/14/22 0800   Non-staged Wound Description Not applicable 32/44/27 3754   Treatments Cleansed 11/18/22 0923   Dressing ABD;Dry dressing;Non adherent; Other (Comment) 11/18/22 0923   Wound packed? No 11/18/22 0923   Packing- # removed 0 11/18/22 0923   Packing- # inserted 0 11/18/22 0923   Dressing Changed New 11/18/22 0923   Patient Tolerance Tolerated well 11/18/22 0923   Dressing Status Clean; Intact;Dry 11/18/22 0923       Wound 10/22/22 Toe (Comment  which one) Anterior;Right (Active) Wound Image   11/18/22 0926   Wound Description Epithelialization;Gangrene;Dusky;Fragile; Necrotic;Eschar 11/18/22 0926   Pressure Injury Stage U 11/08/22 0800   Michaela-wound Assessment Fragile 11/18/22 0926   Wound Length (cm) 0 cm 11/18/22 0926   Wound Width (cm) 0 cm 11/18/22 0926   Wound Depth (cm) 0 cm 11/18/22 0926   Wound Surface Area (cm^2) 0 cm^2 11/18/22 0926   Wound Volume (cm^3) 0 cm^3 11/18/22 0926   Calculated Wound Volume (cm^3) 0 cm^3 11/18/22 0926   Tunneling 0 cm 11/18/22 0926   Tunneling in depth located at 0 11/18/22 0926   Undermining 0 11/18/22 0926   Undermining is depth extending from 0 11/18/22 0926   Wound Site Closure BRADY 11/18/22 0926   Drainage Amount None 11/18/22 0926   Drainage Description Serous 11/06/22 2130   Non-staged Wound Description Not applicable 09/29/59 2984   Treatments Cleansed 11/18/22 0926   Dressing ABD;Dry dressing;Non adherent 11/18/22 0926   Wound packed? No 11/18/22 0926   Packing- # removed 0 11/18/22 0926   Packing- # inserted 0 11/18/22 0926   Dressing Changed New 11/18/22 0926   Patient Tolerance Tolerated well 11/18/22 0926   Dressing Status Clean;Dry; Intact 11/18/22 0926       Wound 10/31/22 Incision Catheter entry/exit site Groin Right (Active)   Wound Description Clean;Dry; Intact 11/14/22 0800   Michaela-wound Assessment BRADY 11/16/22 1500   Wound Site Closure Perclose 11/05/22 0900   Drainage Amount None 11/09/22 0800   Dressing Non adherent 11/14/22 0800   Wound packed? No 11/05/22 0900   Dressing Changed Reinforced 11/03/22 2300   Patient Tolerance Tolerated well 11/03/22 2300   Dressing Status Clean;Dry; Intact 11/14/22 2300       Wound 11/07/22 Groin Left (Active)   Wound Description Pink 11/16/22 1500   Michaela-wound Assessment Clean;Dry; Intact 11/15/22 1910   Wound Site Closure Exofin 11/08/22 2000   Drainage Amount None 11/11/22 1615   Drainage Description Bloody 11/08/22 2000   Treatments Cleansed 11/15/22 1224   Dressing Open to air 11/18/22 0757   Patient Tolerance Tolerated poorly 11/15/22 1224   Dressing Status Clean 11/16/22 1500       Wound 11/10/22 Incision Catheter entry/exit site Groin Right (Active)   Wound Description Clean;Dry; Intact 11/16/22 1500   Michaela-wound Assessment Intact;Dry;Clean 11/14/22 0800   Dressing Open to air 11/18/22 0757   Wound packed? No 11/10/22 1635   Dressing Changed New 11/10/22 1635   Dressing Status Clean;Dry; Intact 11/14/22 2300       Wound 11/11/22 Heel Left;Posterior (Active)   Wound Image    11/18/22 0924   Wound Description Necrotic;Eschar;Epithelialization;Fragile;Dusky 11/18/22 0924   Michaela-wound Assessment Edema 11/18/22 0924   Wound Length (cm) 4 cm 11/18/22 0924   Wound Width (cm) 13 cm 11/18/22 0924   Wound Depth (cm) 0 cm 11/18/22 0924   Wound Surface Area (cm^2) 52 cm^2 11/18/22 0924   Wound Volume (cm^3) 0 cm^3 11/18/22 0924   Calculated Wound Volume (cm^3) 0 cm^3 11/18/22 0924   Tunneling 0 cm 11/18/22 0924   Tunneling in depth located at 0 11/18/22 0924   Undermining 0 11/18/22 0924   Undermining is depth extending from 0 11/18/22 0924   Wound Site Closure BRADY 11/18/22 0924   Drainage Amount None 11/18/22 0924   Drainage Description Serous 11/14/22 0800   Non-staged Wound Description Not applicable 11/48/44 2067   Treatments Cleansed 11/18/22 0924   Dressing ABD;Dry dressing;Non adherent 11/18/22 0924   Wound packed? No 11/18/22 0924   Packing- # removed 0 11/18/22 0924   Packing- # inserted 0 11/18/22 0924   Dressing Changed New 11/18/22 0924   Patient Tolerance Tolerated well 11/18/22 0924   Dressing Status Clean;Dry; Intact 11/18/22 0924       Wound 11/11/22 Thigh Right;Lateral (Active)   Wound Image    11/18/22 0927   Wound Description Epithelialization;Fragile;Light purple 11/18/22 0927   Michaela-wound Assessment Edema 11/18/22 0927   Wound Length (cm) 14 cm 11/18/22 0927   Wound Width (cm) 36 cm 11/18/22 0927   Wound Depth (cm) 0 1 cm 11/18/22 0927   Wound Surface Area (cm^2) 504 cm^2 11/18/22 0927   Wound Volume (cm^3) 50 4 cm^3 11/18/22 0927   Calculated Wound Volume (cm^3) 50 4 cm^3 11/18/22 0927   Change in Wound Size % -1100 11/18/22 0927   Tunneling 0 cm 11/18/22 0927   Tunneling in depth located at 0 11/18/22 0927   Undermining 0 11/18/22 0927   Undermining is depth extending from 0 11/18/22 0927   Wound Site Closure BRADY 11/18/22 0927   Drainage Amount Moderate 11/18/22 0927   Drainage Description Serous 11/18/22 0927   Non-staged Wound Description Partial thickness 11/18/22 0927   Treatments Cleansed 11/18/22 0927   Dressing ABD;Dry dressing;Non adherent 11/18/22 0927   Wound packed? No 11/18/22 0927   Packing- # removed 0 11/18/22 0927   Packing- # inserted 0 11/18/22 0927   Dressing Changed New 11/18/22 0927   Patient Tolerance Tolerated well 11/18/22 0927   Dressing Status Clean;Dry; Intact 11/18/22 0927       Wound 11/11/22 Foot Right;Lateral (Active)   Wound Image    11/18/22 0927   Wound Description Epithelialization;Fragile;Dusky;Light purple 11/18/22 0927   Michaela-wound Assessment Maceration;Fragile 11/18/22 0927   Wound Length (cm) 14 cm 11/11/22 1311   Wound Width (cm) 2 5 cm 11/11/22 1311   Wound Depth (cm) 0 1 cm 11/11/22 1311   Wound Surface Area (cm^2) 35 cm^2 11/11/22 1311   Wound Volume (cm^3) 3 5 cm^3 11/11/22 1311   Calculated Wound Volume (cm^3) 3 5 cm^3 11/11/22 1311   Wound Site Closure Unable to assess 11/13/22 0000   Drainage Amount None 11/18/22 0927   Drainage Description Serous 11/14/22 0800   Non-staged Wound Description Not applicable 43/92/59 8191   Treatments Cleansed 11/18/22 0927   Dressing ABD;Dry dressing;Non adherent 11/18/22 0927   Wound packed? No 11/18/22 0927   Packing- # removed 0 11/18/22 0927   Packing- # inserted 0 11/18/22 0927   Dressing Changed New 11/18/22 0927   Patient Tolerance Tolerated well 11/18/22 0927   Dressing Status Clean;Dry; Intact 11/18/22 0927       Wound 11/11/22 MASD Abdomen Mid;Anterior (Active)   Wound Image   11/18/22 0935   Wound Description Epithelialization 11/18/22 0935   Michaela-wound Assessment Clean;Dry; Intact 11/18/22 0935   Wound Length (cm) 0 cm 11/18/22 0935   Wound Width (cm) 0 cm 11/18/22 0935   Wound Depth (cm) 0 cm 11/18/22 0935   Wound Surface Area (cm^2) 0 cm^2 11/18/22 0935   Wound Volume (cm^3) 0 cm^3 11/18/22 0935   Calculated Wound Volume (cm^3) 0 cm^3 11/18/22 0935   Change in Wound Size % 100 11/18/22 0935   Tunneling 0 cm 11/18/22 0935   Tunneling in depth located at 0 11/18/22 0935   Undermining 0 11/18/22 0935   Undermining is depth extending from 0 11/18/22 0935   Wound Site Closure BRADY 11/18/22 0935   Drainage Amount None 11/18/22 0935   Drainage Description Serous 11/11/22 1258   Non-staged Wound Description Not applicable 76/26/16 6547   Treatments Cleansed 11/18/22 0935   Dressing Open to air 11/18/22 0935   Wound packed? No 11/18/22 0935   Packing- # removed 0 11/18/22 0935   Packing- # inserted 0 11/18/22 0935   Dressing Changed New 11/18/22 0935   Patient Tolerance Tolerated well 11/18/22 0935   Dressing Status Clean;Dry; Intact 11/18/22 0935       Wound 11/11/22 MASD Abdomen Left (Active)   Wound Image   11/18/22 0935   Wound Description Epithelialization 11/18/22 0935   Michaela-wound Assessment Clean;Dry; Intact 11/18/22 0935   Wound Length (cm) 0 cm 11/18/22 0935   Wound Width (cm) 0 cm 11/18/22 0935   Wound Depth (cm) 0 cm 11/18/22 0935   Wound Surface Area (cm^2) 0 cm^2 11/18/22 0935   Wound Volume (cm^3) 0 cm^3 11/18/22 0935   Calculated Wound Volume (cm^3) 0 cm^3 11/18/22 0935   Change in Wound Size % 100 11/18/22 0935   Tunneling 0 cm 11/18/22 0935   Tunneling in depth located at 0 11/18/22 0935   Undermining 0 11/18/22 0935   Undermining is depth extending from 0 11/18/22 0935   Wound Site Closure BRADY 11/18/22 0935   Drainage Amount None 11/18/22 0935   Drainage Description Serous 11/11/22 1258   Non-staged Wound Description Not applicable 19/45/09 0779   Treatments Cleansed 11/18/22 0935   Dressing Open to air 11/18/22 0935   Wound packed? No 11/18/22 0935   Packing- # removed 0 11/18/22 0935   Packing- # inserted 0 11/18/22 0935   Dressing Changed New 11/18/22 0935   Patient Tolerance Tolerated well 11/18/22 0935   Dressing Status Clean;Dry; Intact 11/18/22 0935         Martina SAMSONN, RN, [facility]

## 2022-11-18 NOTE — PROGRESS NOTES
[address] [facility]  Progress Note - [address] [facility] [address] 1960, 65 y o  female MRN: [id number]  Unit/Bed#: E4 -01 Encounter: [id number]  Primary Care Provider: ALEX Arreguin   Date and time admitted to hospital: 10/21/2022  7:58 PM    PAD (peripheral artery disease) [facility])  Assessment & Plan  65year old female former smoker w/HTN, HLD, uncontrolled type II DM (A1c 9 8), hx CVA, presenting with nonhealing extensive L heel ulceration and R hallux and 5th digit ulceration  Vascular surgery consulted for input  S/p multiple b/l endovascular interventions  JUSTIN  R hemispheric CVA    Recommendations:  -Bilateral tissue loss in the setting of uncontrolled type II DM and NYDIA on admission, now s/p angiogram w/intervention  Groin access is soft without hematoma or ecchymosis  -Now s/p multiple bilateral lower extremity endovascular interventions, c/b atheroembolic event to the RLE and JUSTIN with R hemispheric CVA  -Patient will require bilateral lower extremity major amputations  Would recommend holding off on the RLE to allow poor perfused superficial tissue loss to demarcate/slough to allow us to determine level of amputation  Do recommend proceeding with LLE amputation next week  -Extensive conversation with patient regarding level of LLE amputation  Could consider attempting LLE BKA with possibility of requiring more proximal amputation (revision to AKA) vs proceeding with AKA  However, patient wishes to forgo attempt at [address] and proceed with L AKA  Currently scheduled for Wednesday 11/23/22 with Dr Mariann Almaraz  -Cardiology consulted  Appreciate recommendations  -Medical management with ASA, statin, Plavix  -Medical management and glucose control per SLIM  Continue to trend Hgb  -Appreciate ID input  Continue to trend vitals  If patient becomes septic or systemically ill (presumed to be secondary to RLE), would consider guillotine amputation in the emergent setting   However will try to avoid this as long as possible   -Cont argatroban drip in setting of HIT  -Patient seen and examined with Dr Kimberley Kamara  Extensive discussion with patient and family (daughter, brother and sister-in-law) at bedside  All questions and concerns addressed        Subjective:  Patient reports bilateral lower extremity pain  Otherwise, remains stable  Continues to occasionally spike fevers  Infectious Disease is on board  Remains overall stable and not systemically ill  Plan for left above knee amputation on Wednesday  Patient is in agreement    Vitals:  /67 (BP Location: Right arm)   Pulse 91   Temp 97 9 °F (36 6 °C) (Tympanic)   Resp 22   Ht 5' 4" (1 626 m)   Wt 109 kg (239 lb 3 2 oz)   SpO2 93%   BMI 41 06 kg/m²     I/Os:  I/O last 3 completed shifts: In: 120 [P O :120]  Out: 2475 [Urine:2475]  No intake/output data recorded  Lab Results and Cultures:   CBC with diff: Lab Results   Component Value Date    WBC 20 85 (H) 11/18/2022    HGB 7 1 (L) 11/18/2022    HCT 23 1 (L) 11/18/2022    MCV 89 11/18/2022     11/18/2022    MCH 27 4 11/18/2022    MCHC 30 7 (L) 11/18/2022    RDW 15 8 (H) 11/18/2022    MPV 9 0 11/18/2022    NRBC 0 11/18/2022   ,   BMP/CMP:  Lab Results   Component Value Date    SODIUM 139 11/18/2022    K 3 4 (L) 11/18/2022     11/18/2022    CO2 31 11/18/2022    BUN 10 11/18/2022    CREATININE 1 10 11/18/2022    CALCIUM 7 8 (L) 11/18/2022     (H) 11/13/2022    ALT 53 11/13/2022    ALKPHOS 232 (H) 11/13/2022    EGFR 54 11/18/2022   ,   Lipid Panel: No results found for: CHOL,   Coags:   Lab Results   Component Value Date    PTT 73 (H) 11/18/2022    INR 3 70 (H) 11/11/2022   ,     Blood Culture:   Lab Results   Component Value Date    BLOODCX No Growth After 5 Days   11/13/2022    BLOODCX Staphylococcus coagulase negative (A) 11/13/2022   ,   Urinalysis: Lab Results   Component Value Date    COLORU Yellow 11/12/2022    CLARITYU Slightly Cloudy 11/12/2022    SPECGRAV 1 025 11/12/2022    PHUR 5 5 11/12/2022    PHUR 6 5 02/23/2018    LEUKOCYTESUR Trace (A) 11/12/2022    NITRITE Positive (A) 11/12/2022    GLUCOSEU Negative 11/12/2022    KETONESU Negative 11/12/2022    BILIRUBINUR Negative 11/12/2022    BLOODU Large (A) 11/12/2022   ,   Urine Culture:   Lab Results   Component Value Date    URINECX >100,000 cfu/ml Enterobacter cloacae complex (A) 11/12/2022    URINECX 70,000-79,000 cfu/ml Kluyveromyces marxianus (A) 11/12/2022   ,   Wound Culure: No results found for: WOUNDCULT    Medications:  Current Facility-Administered Medications   Medication Dose Route Frequency   • acetaminophen (TYLENOL) tablet 650 mg  650 mg Oral Q6H PRN   • ALPRAZolam (XANAX) tablet 0 25 mg  0 25 mg Oral Daily PRN   • amLODIPine (NORVASC) tablet 10 mg  10 mg Oral Daily   • argatroban infusion (premix)  0 1-3 mcg/kg/min Intravenous Titrated   • atorvastatin (LIPITOR) tablet 40 mg  40 mg Oral Daily With Dinner   • [MAR Hold] chlorhexidine (PERIDEX) 0 12 % oral rinse 15 mL  15 mL Swish & Spit Once   • clopidogrel (PLAVIX) tablet 75 mg  75 mg Oral Daily   • doxazosin (CARDURA) tablet 2 mg  2 mg Oral Daily   • gabapentin (NEURONTIN) capsule 200 mg  200 mg Oral BID   • heparin (porcine) 2,000 Units, papaverine 60 mg in multi-electrolyte (ISOLYTE-S PH 7 4 equivalent) 500 mL irrigation   Irrigation Once   • insulin glargine (LANTUS) subcutaneous injection 15 Units 0 15 mL  15 Units Subcutaneous HS   • insulin lispro (HumaLOG) 100 units/mL subcutaneous injection 1-6 Units  1-6 Units Subcutaneous TID AC   • naloxone (NARCAN) 0 04 mg/mL syringe 0 04 mg  0 04 mg Intravenous Q1MIN PRN   • nystatin (MYCOSTATIN) powder   Topical BID   • ondansetron (ZOFRAN) injection 4 mg  4 mg Intravenous Q6H PRN   • oxyCODONE (ROXICODONE) IR tablet 5 mg  5 mg Oral Q4H PRN   • pantoprazole (PROTONIX) EC tablet 40 mg  40 mg Oral Early Morning   • polyethylene glycol (MIRALAX) packet 17 g  17 g Oral Daily PRN   • QUEtiapine (SEROquel) tablet 25 mg  25 mg Oral HS   • senna (SENOKOT) tablet 8 6 mg  1 tablet Oral HS PRN   • sertraline (ZOLOFT) tablet 150 mg  150 mg Oral Daily       Imaging:  Reviewed  Physical Exam:    General: Alert and oriented  In on acute distress     CV: Regular rate   Respiratory: Normal effort   Abdominal: Soft, non-distended   Extremities: Extensive tissue loss to the bilateral lower extremities   Neurologic: Grossly normal      Rigo Morales PA-C  11/18/2022

## 2022-11-18 NOTE — PROCEDURES
Insert PICC line    Date/Time: 11/18/2022 1:15 PM  Performed by: Patricia Bhakta RN  Authorized by: Carlos Clement MD     Patient location:  Bedside  Consent:     Consent obtained:  Written    Consent given by:  Patient    Procedural risks discussed: consent obtained by physician  [place] protocol:     Procedure explained and questions answered to patient or proxy's satisfaction: yes      Relevant documents present and verified: yes      Test results available and properly labeled: yes      Radiology Images displayed and confirmed  If images not available, report reviewed: yes      Required blood products, implants, devices, and special equipment available: yes      Site/side marked: yes      Immediately prior to procedure, a time out was called: yes      Patient identity confirmed:  Verbally with patient, arm band, provided demographic data and hospital-assigned identification number  Pre-procedure details:     Hand hygiene: Hand hygiene performed prior to insertion      Sterile barrier technique: All elements of maximal sterile technique followed      Skin preparation:  ChloraPrep    Skin preparation agent: Skin preparation agent completely dried prior to procedure    Indications:     PICC line indications: no peripheral vascular access    Anesthesia (see MAR for exact dosages):      Anesthesia method:  Local infiltration    Local anesthetic:  Lidocaine 1% w/o epi  Procedure details:     Location:  Basilic    Vessel type: vein      Laterality:  Right    Approach: percutaneous technique used      Patient position:  Flat    Procedural supplies:  Double lumen    Catheter size:  5 Fr    Landmarks identified: yes      Ultrasound guidance: yes      Ultrasound image availability:  Not saved    Sterile ultrasound techniques: Sterile gel and sterile probe covers were used      Number of attempts:  1    Successful placement: yes      Vessel of catheter tip end:  Sherlock 3CG confirmed (sherlock 3cg procedure record confirmed placement sent to medical records)    Total catheter length (cm):  40    Catheter out on skin (cm):  0    Max flow rate:  999ml/hr    Arm circumference:  40cm  Post-procedure details:     Post-procedure:  Dressing applied and securement device placed    Assessment:  Blood return through all ports and free fluid flow (sherlock 3cg)    Post-procedure complications: none      Patient tolerance of procedure:   Tolerated well, no immediate complications  Comments:      [facility]#[id number] 2023-11-30

## 2022-11-18 NOTE — PLAN OF CARE
Problem: INFECTION - ADULT  Goal: Absence or prevention of progression during hospitalization  Description: INTERVENTIONS  Picc procedure and maintenance  - Assess and monitor for signs and symptoms of infection  - Monitor lab/diagnostic results  - Monitor all insertion sites, i e  indwelling lines, tubes, and drains  - Administer medications as ordered  - Instruct and encourage patient and family to use good hand hygiene technique  Outcome: Progressing     Problem: Knowledge Deficit  Goal: Patient/family/caregiver demonstrates understanding of disease process, treatment plan, medications, and discharge instructions  Description: Complete learning assessment and assess knowledge base    Interventions:  Picc procedure and maintenance  - Provide teaching at level of understanding  - Provide teaching via preferred learning methods  Outcome: Progressing

## 2022-11-18 NOTE — PROGRESS NOTES
Progress Note - Infectious Disease   Ang Jeffery 65 y o  female MRN: [id number]  Unit/Bed#: E4 -01 Encounter: [id number]      Impression/Plan:  1  Intermittent Fever/Leukocytosis:  - Patient with fever on 11/12 and again 11/16  She also has had relatively persistent leukocytosis throughout majority of admission with increase over last two days from 15 to 20  Suspect these are likely reactive/inflammatory in nature to her ongoing tissue necrosis from limb ischemia and possible from evolving infarct from recently found MCA stroke  Vascular surgery reassessed leg wounds yesterday, does not appear to be current concern for acute superinfection  Recent blood cx 11/13 with 1/2 Coag neg staph only, likely skin contaminant  As below suspect recent urine cx representative of pa colonization  CXR 11/13 without consolidation and patient without pulmonary complaints  Remains hemodynamically stable overall off antibiotics  - for now would closely monitor off antibiotics  - she may continue to have leukocytosis from tissue necrosis until eventual definitive surgery  - please have wound care assess leg wounds   - monitor leg wounds closely for evidence of infection  - If febrile again, please resend two sets of blood cultures  - trend CBC with dff and fever curve  - monitor vitals    2  Bilateral Leg Wounds/Tissue Necrosis in Setting of PAD:  - Patient initially with chronic eschar to left heel and dry necrosis of right 5th digit of foot, found to have severe PAD with arterial obstructions requiring intervention  She since has had complications with atheroembolic events leading to ongoing tissue necrosis/limb ischemia and development of skin wounds/blistering  Vascular surgery following and awaiting skin tissue progression  Tentative plans for left AKA next week and eventual possible salvage right BKA      - please have wound care evaluate legs as well   - ongoing close monitoring for progression/evidence of infection   - appreciate Vascular surgery input   - likely major  of leukocytosis and intermittent fever    3  Asymptomatic bacteriuria:  - Urine culture added onto previous nephrology UA collected on 11/12/2022 after patient developed fever on 11/13/2022  There was growth of enterobacter and she was given Ceftriaxone  I have low suspicion this finding represents active UTI  Fever likely reactive to emboli as patient found to have stroke later that day, vs ongoing tissue necrosis/ischemia  Also, her persistent leukocytosis is likely reactive to her ongoing leg issues  Given low suspicion for acute UTI antibiotic treatment was discontinued  Pa was exchanged   -monitor patient off antibiotics  -monitor CBCD and BMP  -serial exams and care of pa catheter  -monitor for  symptoms  -monitor urine output     4  Coag negative staph bacteremia  Staph epidermidis present in one set of blood cultures drawn on 11/13/2022  Suspect this is a contaminant and not representative of active bacteremia  Patient has had TTE during neurology stroke work up and there was no evidence of valvular vegetation/endocarditis  - no further workup at this time    - if has new fever as above, can repeat blood cultures     5  Stroke  CT head imaging revealing acute R hemispheric stroke on 11/13/2022 after patient experienced change in mental status  Neurology was following  Stroke likely embolic in nature   CTA demonstrated moderate stenosis of the bilateral ICAs and vertebrals  No vegetations noted on TTE  Current blood cultures not reflective of active bacteremia    -serial neuro exams  -monitor mood and mental status  -supportive care     6  Type 2 diabetes mellitus with long term insulin use  Patient's last HbA1c was 9 8% on 10/25/2022  Elevated blood glucose is risk factor for wounds and infection  Recommend tight glycemic control   -blood glucose management per primary service      7  Morbid obesity   BMI = 41 40      Plan and recommendations were discussed with primary team   ID will plan to see again   Otherwise call weekend ID provider with questions  Antibiotics:  Off antibiotics day 3    Subjective:  Patient afebrile over last 24 hours  White count stable this morning at 20 8  Vascular surgery re-evaluate patient's leg wounds yesterday, does not appear to be any acute concern for bacterial superinfection of wounds currently  Tentative plans for left AKA next week and possible right BKA  Today she denies any cough, SOB, abd pain, diarrhea  Leg marisela is about the same  No new complaints  Objective:  Vitals:  Temp:  [98 °F (36 7 °C)-99 6 °F (37 6 °C)] 98 °F (36 7 °C)  HR:  [82-97] 85  Resp:  [20-22] 22  BP: (114-155)/(58-79) 114/71  SpO2:  [92 %-96 %] 92 %  Temp (24hrs), Av 8 °F (37 1 °C), Min:98 °F (36 7 °C), Max:99 6 °F (37 6 °C)  Current: Temperature: 98 °F (36 7 °C)    Physical Exam:   General Appearance:  Alert, interactive, nontoxic, no acute distress  Throat: Oropharynx moist without lesions  Lungs:   Clear to auscultation bilaterally; no wheezes, rhonchi or rales; respirations unlabored   Heart:  RRR; no murmur, rub or gallop   Abdomen:   Soft, non-tender, non-distended, positive bowel sounds  Extremities: No clubbing, cyanosis or edema   Skin: Dry tissue necrosis of left heel, right toes  Mottled appearance and blistering wounds of right thigh       Labs:    All pertinent labs and imaging studies were personally reviewed  Results from last 7 days   Lab Units 22  0410 22  0504 22  0904   WBC Thousand/uL 20 85* 20 50* 14 76*   HEMOGLOBIN g/dL 7 1* 7 5* 7 5*   PLATELETS Thousands/uL 384 382 277     Results from last 7 days   Lab Units 22  0410 22  0504 22  0904 22  0619 22  0148 22  0424   SODIUM mmol/L 139 140 136   < > 140 140   POTASSIUM mmol/L 3 4* 3 2* 3 5   < > 3 1* 3 4*   CHLORIDE mmol/L 103 102 100   < > 102 103   CO2 mmol/L 31 32 29   < > 31 29   BUN mg/dL 10 12 14   < > 16 17   CREATININE mg/dL 1 10 1 06 0 96   < > 1 08 1 03   EGFR ml/min/1 73sq m 54 56 64   < > 55 58   CALCIUM mg/dL 7 8* 8 2* 7 8*   < > 7 8* 7 9*   AST U/L  --   --   --   --  114* 95*   ALT U/L  --   --   --   --  53 46   ALK PHOS U/L  --   --   --   --  232* 102    < > = values in this interval not displayed  Results from last 7 days   Lab Units 11/15/22  0559 11/14/22  0619 11/13/22  0148   PROCALCITONIN ng/ml 0 93* 1 00* 0 48*         Results from last 7 days   Lab Units 11/15/22  0559   FERRITIN ng/mL 824*     Results from last 7 days   Lab Units 11/11/22  1918   D-DIMER QUANTITATIVE ug/ml FEU 3 19*       Micro:  Results from last 7 days   Lab Units 11/13/22  0109 11/12/22  1625   BLOOD CULTURE  No Growth After 4 Days    Staphylococcus coagulase negative*  --    GRAM STAIN RESULT  Gram positive cocci in clusters*  --    URINE CULTURE   --  >100,000 cfu/ml Enterobacter cloacae complex*  70,000-79,000 cfu/ml Kluyveromyces marxianus*       Imaging:          Tana Fuller MD  [facility]

## 2022-11-18 NOTE — ASSESSMENT & PLAN NOTE
S/p 2U pRBC  Once intervention date has been set, will likely have to transfuse her to 8 0 per my discussion with vascular surgery today      Recent Labs     11/17/22  0504 11/18/22  0410   HGB 7 5* 7 1*

## 2022-11-18 NOTE — ASSESSMENT & PLAN NOTE
Possibly due to HIT   Resolved  · Awaiting heparin antibody by serotonin release  · Heparin induced antibody elevated    Recent Labs     11/16/22  0904 11/17/22  0504 11/18/22  0410    382 384

## 2022-11-18 NOTE — CASE MANAGEMENT
Case Management Discharge Planning Note    Patient name Ami Encarnacion  Location [address] [address] [address]/E4 [address] [facility]-* MRN [id number]  : 1960 Date 2022       Current Admission Date: 10/21/2022  Current Admission Diagnosis:Stroke [facility])   Patient Active Problem List    Diagnosis Date Noted   • Diarrhea 2022   • CVA (cerebral vascular accident) ([address] ) 2022   • UTI (urinary tract infection) 2022   • Thrombocytopenia ([address] ) 2022   • Diabetic ulcer of heel associated with diabetes mellitus due to underlying condition ([address] ) 11/10/2022   • Acute on chronic anemia 10/30/2022   • Generalized edema due to fluid overload 10/27/2022   • PAD (peripheral artery disease) ([address] ) 10/25/2022   • Non healing left heel wound 10/22/2022   • Hypertensive urgency 10/22/2022   • Hypokalemia 10/22/2022   • Wound of lower extremity 10/21/2022   • Epigastric pain 2022   • Type 2 diabetes mellitus with hyperglycemia, with long-term current use of insulin ([address] ) 10/18/2021   • Hyperparathyroidism ([address] ) 10/18/2021   • 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 ([address] ) 2021   • 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   • Moderate protein-calorie malnutrition ([address] ) 2020   • Acute colitis 2020   • Arthritis 2019   • Depression, recurrent ([address] ) 2018   • Class 3 severe obesity due to excess calories with serious comorbidity and body mass index (BMI) of 40 0 to 44 9 in adult ([address] ) 2018   • Acute respiratory failure with hypoxia ([address] ) 2018   • Esophageal reflux 2018   • Uncontrolled type 2 diabetes with neuropathy 2018   • Diabetic peripheral neuropathy ([address] )    • Systolic murmur    • Stroke ([facility] [address] ) 2015   • Anxiety 2015   • Vitamin D deficiency 2015   • Benign essential hypertension 2012   • Familial combined hyperlipidemia 2012      LOS (days): 27  Geometric Mean LOS (GMLOS) (days):   Days to GMLOS:     OBJECTIVE:  Risk of Unplanned Readmission Score: 30 56         Current admission status: Inpatient   Preferred Pharmacy:   [id number] [facility] [address]  [address]  [address] [facility]  Phone: [phone] Fax: [phone]    Primary Care Provider: ALEX Will    Primary Insurance: BLUE CROSS  Secondary Insurance:     DISCHARGE DETAILS:          Additional Comments: LSW covering case today  Referrals were already made for SNF IP Rehab  The plan is for both legs off on Monday 11/21/22  Met with pt and gave her the SNF choice list  She would like to discuss with her family about which facility

## 2022-11-18 NOTE — ASSESSMENT & PLAN NOTE
Uncontrolled diabetes, hypertension, hyperlipidemia  Lost to follow-up and poorly compliant to diabetic regimen  · S/p abdominal aortogram,  LLE SFA shockwave angioplasty/FATOU 10/31/22   · S/p R femoral endarterectomy 11/7  · S/p aspiration thrombectomy in right common iliac artery and left SFA  · Heparin drip transitioned to Argatroban in the setting of thrombocytopenia and concern for HIT  · Continue statin / Plavix  · PICC line requested  · Per cardiology may proceed with surgical intervention  Awaiting final schedule from vascular surgery  Tentative plan Monday versus Tuesday

## 2022-11-19 LAB
ABO GROUP BLD: NORMAL
ANION GAP SERPL CALCULATED.3IONS-SCNC: 7 MMOL/L (ref 4–13)
APTT PPP: 83 SECONDS (ref 23–37)
APTT PPP: 84 SECONDS (ref 23–37)
APTT PPP: 86 SECONDS (ref 23–37)
BLD GP AB SCN SERPL QL: NEGATIVE
BUN SERPL-MCNC: 9 MG/DL (ref 5–25)
CALCIUM SERPL-MCNC: 7.7 MG/DL (ref 8.3–10.1)
CHLORIDE SERPL-SCNC: 103 MMOL/L (ref 96–108)
CO2 SERPL-SCNC: 30 MMOL/L (ref 21–32)
CREAT SERPL-MCNC: 1.01 MG/DL (ref 0.6–1.3)
ERYTHROCYTE [DISTWIDTH] IN BLOOD BY AUTOMATED COUNT: 15.3 % (ref 11.6–15.1)
GFR SERPL CREATININE-BSD FRML MDRD: 60 ML/MIN/1.73SQ M
GLUCOSE SERPL-MCNC: 121 MG/DL (ref 65–140)
GLUCOSE SERPL-MCNC: 124 MG/DL (ref 65–140)
GLUCOSE SERPL-MCNC: 134 MG/DL (ref 65–140)
GLUCOSE SERPL-MCNC: 136 MG/DL (ref 65–140)
GLUCOSE SERPL-MCNC: 143 MG/DL (ref 65–140)
HCT VFR BLD AUTO: 23.3 % (ref 34.8–46.1)
HCT VFR BLD AUTO: 23.4 % (ref 34.8–46.1)
HGB BLD-MCNC: 7.2 G/DL (ref 11.5–15.4)
HGB BLD-MCNC: 7.2 G/DL (ref 11.5–15.4)
MAGNESIUM SERPL-MCNC: 1.8 MG/DL (ref 1.6–2.6)
MCH RBC QN AUTO: 27.4 PG (ref 26.8–34.3)
MCHC RBC AUTO-ENTMCNC: 30.8 G/DL (ref 31.4–37.4)
MCV RBC AUTO: 89 FL (ref 82–98)
PHOSPHATE SERPL-MCNC: 4.3 MG/DL (ref 2.3–4.1)
PLATELET # BLD AUTO: 384 THOUSANDS/UL (ref 149–390)
PMV BLD AUTO: 9 FL (ref 8.9–12.7)
POTASSIUM SERPL-SCNC: 3.3 MMOL/L (ref 3.5–5.3)
RBC # BLD AUTO: 2.63 MILLION/UL (ref 3.81–5.12)
RH BLD: NEGATIVE
SODIUM SERPL-SCNC: 140 MMOL/L (ref 135–147)
SPECIMEN EXPIRATION DATE: NORMAL
WBC # BLD AUTO: 16.96 THOUSAND/UL (ref 4.31–10.16)

## 2022-11-19 PROCEDURE — 86901 BLOOD TYPING SEROLOGIC RH(D): CPT | Performed by: PHYSICIAN ASSISTANT

## 2022-11-19 PROCEDURE — 85027 COMPLETE CBC AUTOMATED: CPT | Performed by: PHYSICIAN ASSISTANT

## 2022-11-19 PROCEDURE — 83735 ASSAY OF MAGNESIUM: CPT | Performed by: STUDENT IN AN ORGANIZED HEALTH CARE EDUCATION/TRAINING PROGRAM

## 2022-11-19 PROCEDURE — 82948 REAGENT STRIP/BLOOD GLUCOSE: CPT

## 2022-11-19 PROCEDURE — 80048 BASIC METABOLIC PNL TOTAL CA: CPT | Performed by: STUDENT IN AN ORGANIZED HEALTH CARE EDUCATION/TRAINING PROGRAM

## 2022-11-19 PROCEDURE — 99232 SBSQ HOSP IP/OBS MODERATE 35: CPT | Performed by: STUDENT IN AN ORGANIZED HEALTH CARE EDUCATION/TRAINING PROGRAM

## 2022-11-19 PROCEDURE — 86900 BLOOD TYPING SEROLOGIC ABO: CPT | Performed by: PHYSICIAN ASSISTANT

## 2022-11-19 PROCEDURE — 85730 THROMBOPLASTIN TIME PARTIAL: CPT | Performed by: STUDENT IN AN ORGANIZED HEALTH CARE EDUCATION/TRAINING PROGRAM

## 2022-11-19 PROCEDURE — 86923 COMPATIBILITY TEST ELECTRIC: CPT

## 2022-11-19 PROCEDURE — 85014 HEMATOCRIT: CPT | Performed by: STUDENT IN AN ORGANIZED HEALTH CARE EDUCATION/TRAINING PROGRAM

## 2022-11-19 PROCEDURE — 86850 RBC ANTIBODY SCREEN: CPT | Performed by: PHYSICIAN ASSISTANT

## 2022-11-19 PROCEDURE — 85018 HEMOGLOBIN: CPT | Performed by: STUDENT IN AN ORGANIZED HEALTH CARE EDUCATION/TRAINING PROGRAM

## 2022-11-19 PROCEDURE — 84100 ASSAY OF PHOSPHORUS: CPT | Performed by: STUDENT IN AN ORGANIZED HEALTH CARE EDUCATION/TRAINING PROGRAM

## 2022-11-19 RX ORDER — POTASSIUM CHLORIDE 20MEQ/15ML
20 LIQUID (ML) ORAL
Status: COMPLETED | OUTPATIENT
Start: 2022-11-19 | End: 2022-11-19

## 2022-11-19 RX ADMIN — PANTOPRAZOLE SODIUM 40 MG: 40 TABLET, DELAYED RELEASE ORAL at 05:42

## 2022-11-19 RX ADMIN — ACETAMINOPHEN 650 MG: 325 TABLET, FILM COATED ORAL at 00:56

## 2022-11-19 RX ADMIN — DOXAZOSIN 2 MG: 2 TABLET ORAL at 08:21

## 2022-11-19 RX ADMIN — SERTRALINE HYDROCHLORIDE 150 MG: 100 TABLET ORAL at 08:20

## 2022-11-19 RX ADMIN — ACETAMINOPHEN 650 MG: 325 TABLET, FILM COATED ORAL at 23:10

## 2022-11-19 RX ADMIN — CLOPIDOGREL BISULFATE 75 MG: 75 TABLET ORAL at 08:21

## 2022-11-19 RX ADMIN — NYSTATIN: 100000 POWDER TOPICAL at 18:10

## 2022-11-19 RX ADMIN — OXYCODONE 5 MG: 5 TABLET ORAL at 14:46

## 2022-11-19 RX ADMIN — AMLODIPINE BESYLATE 10 MG: 10 TABLET ORAL at 08:21

## 2022-11-19 RX ADMIN — OXYCODONE 5 MG: 5 TABLET ORAL at 07:32

## 2022-11-19 RX ADMIN — ARGATROBAN 0.1 MCG/KG/MIN: 50 INJECTION, SOLUTION INTRAVENOUS at 14:47

## 2022-11-19 RX ADMIN — ACETAMINOPHEN 650 MG: 325 TABLET, FILM COATED ORAL at 05:42

## 2022-11-19 RX ADMIN — HYDROMORPHONE HYDROCHLORIDE 0.5 MG: 1 INJECTION, SOLUTION INTRAMUSCULAR; INTRAVENOUS; SUBCUTANEOUS at 09:29

## 2022-11-19 RX ADMIN — ATORVASTATIN CALCIUM 40 MG: 40 TABLET, FILM COATED ORAL at 16:56

## 2022-11-19 RX ADMIN — NYSTATIN: 100000 POWDER TOPICAL at 08:21

## 2022-11-19 RX ADMIN — GABAPENTIN 200 MG: 100 CAPSULE ORAL at 18:10

## 2022-11-19 RX ADMIN — POTASSIUM CHLORIDE 20 MEQ: 20 SOLUTION ORAL at 14:14

## 2022-11-19 RX ADMIN — ACETAMINOPHEN 650 MG: 325 TABLET, FILM COATED ORAL at 18:10

## 2022-11-19 RX ADMIN — ALPRAZOLAM 0.25 MG: 0.25 TABLET ORAL at 18:11

## 2022-11-19 RX ADMIN — ACETAMINOPHEN 650 MG: 325 TABLET, FILM COATED ORAL at 12:22

## 2022-11-19 RX ADMIN — QUETIAPINE FUMARATE 25 MG: 25 TABLET ORAL at 20:51

## 2022-11-19 RX ADMIN — POTASSIUM CHLORIDE 20 MEQ: 20 SOLUTION ORAL at 16:56

## 2022-11-19 RX ADMIN — GABAPENTIN 200 MG: 100 CAPSULE ORAL at 08:20

## 2022-11-19 RX ADMIN — OXYCODONE 5 MG: 5 TABLET ORAL at 20:50

## 2022-11-19 RX ADMIN — HYDROMORPHONE HYDROCHLORIDE 0.5 MG: 1 INJECTION, SOLUTION INTRAMUSCULAR; INTRAVENOUS; SUBCUTANEOUS at 17:25

## 2022-11-19 RX ADMIN — INSULIN GLARGINE 15 UNITS: 100 INJECTION, SOLUTION SUBCUTANEOUS at 20:51

## 2022-11-19 NOTE — ASSESSMENT & PLAN NOTE
S/p 2U pRBC  Once intervention date has been set, will likely have to transfuse her to 8 0 per my discussion with vascular surgery once timing has been finalized      Recent Labs     11/18/22  0410 11/19/22  0728   HGB 7 1* 7 2*

## 2022-11-19 NOTE — ASSESSMENT & PLAN NOTE
Likely due to GI losses   Resolved  · Continue repleting PRN    Recent Labs     11/17/22  0504 11/18/22  0401 11/18/22  0410 11/19/22  0549   K 3 2*  --  3 4* 3 3*   MG 1 8 1 8  --  1 8

## 2022-11-19 NOTE — PROGRESS NOTES
[facility]  Progress Note - Jacqulin Mater 1960, 65 y o  female MRN: [id number]  Unit/Bed#: E4 -01 Encounter: [id number]  Primary Care Provider: ALEX Morocho   Date and time admitted to hospital: 10/21/2022  7:58 PM    * Stroke [facility])  Assessment & Plan  65year old female is currently admitted due to a moderate to large volume left MCA territory stroke  · Repeat CT head showed an evolving acute infarct in right parietal-temporal-occipital lobes with questionable petechial cortical hemorrhage in right parietal lobe  Discussed with Dr Glenis Li  Recommended that we continue anticoagulation for now due to the clinical insignificance of the size and the risk of taking her off anticoagulation far outweighs the benefits off of it  · Continue argatroban until surgical intervention has been completed  PAD (peripheral artery disease) ([facility])  Assessment & Plan  Uncontrolled diabetes, hypertension, hyperlipidemia  Lost to follow-up and poorly compliant to diabetic regimen  · S/p abdominal aortogram,  LLE SFA shockwave angioplasty/FATOU 10/31/22   · S/p R femoral endarterectomy 11/7  · S/p aspiration thrombectomy in right common iliac artery and left SFA  · Heparin drip transitioned to Argatroban in the setting of thrombocytopenia and concern for HIT  · Continue statin / Plavix  · PICC line in place  · Per cardiology may proceed with surgical intervention  Awaiting final schedule from vascular surgery  Currently scheduled for Wednesday 11/23/22 with Dr Ne Harris    UTI (urinary tract infection)  Assessment & Plan  UA positive, but likely contaminant rather than an actual urinary tract infection  · Appreciate ID recommendations  Monitor off antibiotics  Thrombocytopenia ([facility] [address] )  Assessment & Plan  Possibly due to HIT   Resolved  · Awaiting heparin antibody by serotonin release (performed 11/11)  · Heparin induced antibody elevated    Recent Labs     11/17/22  3941 11/18/22  0410 11/19/22  0728    384 384         Diabetic ulcer of heel associated with diabetes mellitus due to underlying condition [facility])  Assessment & Plan  Vascular surgery planning bilateral BKA  Awaiting definite intervention date     Acute on chronic anemia  Assessment & Plan  S/p 2U pRBC  Once intervention date has been set, will likely have to transfuse her to 8 0 per my discussion with vascular surgery once timing has been finalized  Recent Labs     11/18/22  0410 11/19/22  0728   HGB 7 1* 7 2*         Hypokalemia  Assessment & Plan  Likely due to GI losses  Resolved  · Continue repleting PRN    Recent Labs     11/17/22  0504 11/18/22  0401 11/18/22  0410 11/19/22  0549   K 3 2*  --  3 4* 3 3*   MG 1 8 1 8  --  1 8         Type 2 diabetes mellitus with hyperglycemia, with long-term current use of insulin [facility])  Assessment & Plan  Lab Results   Component Value Date    HGBA1C 9 8 (H) 10/25/2022     Recent Labs     11/18/22  1657 11/18/22  2057 11/19/22  0710 11/19/22  1113   POCGLU 164* 187* 121 134     · Inpatient regimen: Sliding scale, Glargine 15U HS    Benign essential hypertension  Assessment & Plan  Above goal  · Continue amlodipine, Cardura    Depression, recurrent (HCC)  Assessment & Plan  Evaluated by Psychiatry on this admission  · Continue Zoloft 150 mg daily / Quetiapine 25mg po Qhs    Acute respiratory failure with hypoxia ([facility] [address] )  Assessment & Plan  Echo on 11/07 shows 28% LVEF, no diastolic dysfunction  Postprocedural hypoxia  · Currently on 1 5L NC      VTE Pharmacologic Prophylaxis:   Pharmacologic: Argatroban  Mechanical VTE Prophylaxis in Place: Yes    Discussions with Specialists or Other Care Team Provider: nursing    Education and Discussions with Family / Patient: patient    Time Spent for Care: 30 minutes  More than 50% of total time spent on counseling and coordination of care as described above      Current Length of Stay: 28 day(s)    Current Patient Status: Inpatient Certification Statement: The patient will continue to require additional inpatient hospital stay due to vascular intervention    Discharge Plan: active    Code Status: Level 1 - Full Code      Subjective:   Patient seen and examined at bedside  Comfortable  No new complaints overnight  Objective:     Vitals:   Temp (24hrs), Av 9 °F (36 6 °C), Min:97 7 °F (36 5 °C), Max:98 °F (36 7 °C)    Temp:  [97 7 °F (36 5 °C)-98 °F (36 7 °C)] 98 °F (36 7 °C)  HR:  [71-91] 71  Resp:  [20-22] 20  BP: (140-149)/(65-71) 140/65  SpO2:  [93 %-100 %] 95 %  Body mass index is 41 06 kg/m²  Input and Output Summary (last 24 hours): Intake/Output Summary (Last 24 hours) at 2022 1333  Last data filed at 2022 0929  Gross per 24 hour   Intake --   Output 2625 ml   Net -2625 ml       Physical Exam:     Physical Exam  Vitals reviewed  Constitutional:       General: She is not in acute distress  HENT:      Head: Normocephalic  Nose: Nose normal       Mouth/Throat:      Mouth: Mucous membranes are moist    Eyes:      General: No scleral icterus  Cardiovascular:      Rate and Rhythm: Normal rate  Pulmonary:      Effort: Pulmonary effort is normal  No respiratory distress  Abdominal:      General: There is no distension  Palpations: Abdomen is soft  Tenderness: There is no abdominal tenderness  Musculoskeletal:      Comments: BLLE wounds and blister   Skin:     General: Skin is warm  Neurological:      Mental Status: She is alert  Mental status is at baseline     Psychiatric:         Mood and Affect: Mood normal          Behavior: Behavior normal        Additional Data:     Labs:    Results from last 7 days   Lab Units 22  0728 22  0410   WBC Thousand/uL 16 96* 20 85*   HEMOGLOBIN g/dL 7 2* 7 1*   HEMATOCRIT % 23 4* 23 1*   PLATELETS Thousands/uL 384 384   NEUTROS PCT %  --  81*   LYMPHS PCT %  --  7*   MONOS PCT %  --  8   EOS PCT %  --  2     Results from last 7 days   Lab Units 11/19/22  0549 11/14/22  0619 11/13/22  0148   SODIUM mmol/L 140   < > 140   POTASSIUM mmol/L 3 3*   < > 3 1*   CHLORIDE mmol/L 103   < > 102   CO2 mmol/L 30   < > 31   BUN mg/dL 9   < > 16   CREATININE mg/dL 1 01   < > 1 08   ANION GAP mmol/L 7   < > 7   CALCIUM mg/dL 7 7*   < > 7 8*   ALBUMIN g/dL  --   --  1 5*   TOTAL BILIRUBIN mg/dL  --   --  0 67   ALK PHOS U/L  --   --  232*   ALT U/L  --   --  53   AST U/L  --   --  114*   GLUCOSE RANDOM mg/dL 124   < > 85    < > = values in this interval not displayed  Results from last 7 days   Lab Units 11/19/22  1113 11/19/22  0710 11/18/22 2057 11/18/22  1657 11/18/22  1103 11/18/22  0744 11/17/22 2057 11/17/22  1546 11/17/22  1118 11/17/22  0752 11/16/22  2055 11/16/22  1600   POC GLUCOSE mg/dl 134 121 187* 164* 145* 129 190* 187* 159* 135 148* 153*         Results from last 7 days   Lab Units 11/15/22  0559 11/14/22  0619 11/13/22  0148   LACTIC ACID mmol/L  --   --  1 0   PROCALCITONIN ng/ml 0 93* 1 00* 0 48*           * I Have Reviewed All Lab Data Listed Above  * Additional Pertinent Lab Tests Reviewed: [address] Admission Reviewed      Lines:  Invasive Devices     Peripherally Inserted Central Catheter Line  Duration           PICC Line 04/60/20 Right Basilic 1 day          Peripheral Intravenous Line  Duration           Peripheral IV 11/15/22 Left Antecubital 3 days          Drain  Duration           Urethral Catheter Non-latex 18 Fr  2 days               Imaging:    Imaging Reports Reviewed Today Include: no new imaging    Recent Cultures (last 7 days):     Results from last 7 days   Lab Units 11/13/22  0109 11/12/22  1625   BLOOD CULTURE  No Growth After 5 Days    Staphylococcus coagulase negative*  --    GRAM STAIN RESULT  Gram positive cocci in clusters*  --    URINE CULTURE   --  >100,000 cfu/ml Enterobacter cloacae complex*  70,000-79,000 cfu/ml Kluyveromyces marxianus*       Last 24 Hours Medication List:   Current Facility-Administered Medications   Medication Dose Route Frequency Provider Last Rate   • acetaminophen  650 mg Oral Q6H [facility] Pilo Moreau PA-C     • ALPRAZolam  0 25 mg Oral Daily PRN Sloan Nick MD     • amLODIPine  10 mg Oral Daily Shae Monroe MD     • argatroban  0 1-3 mcg/kg/min Intravenous Titrated Sloan Nick MD 0 1 mcg/kg/min (11/19/22 1673)   • atorvastatin  40 mg Oral Daily With Jay Pittman MD     • [facility] Hold] chlorhexidine  15 mL Swish & Spit Once Lili Sadler, DO     • clopidogrel  75 mg Oral Daily Carola Zimmer MD     • doxazosin  2 mg Oral Daily Carola Zimmer MD     • gabapentin  200 mg Oral BID Carola Zimmer MD     • heparin 2000 units and papaverine 60 mg in isolyte equivalent 500 mL irrigation bag   Irrigation Once Nadira Lake MD     • HYDROmorphone  0 5 mg Intravenous Q3H PRN Pilo Moreau PA-C     • insulin glargine  15 Units Subcutaneous HS Carola Zimmer MD     • insulin lispro  1-6 Units Subcutaneous TID AC Carola Zimmer MD     • naloxone  0 04 mg Intravenous Q1MIN PRN Carola Zimmer MD     • nystatin   Topical BID Carola Zimmer MD     • ondansetron  4 mg Intravenous Q6H PRN Carola Zimmer MD     • oxyCODONE  5 mg Oral Q4H PRN Sloan Nick MD     • pantoprazole  40 mg Oral Early Morning Carola Zimmer MD     • polyethylene glycol  17 g Oral Daily PRN Carola Zimmer MD     • potassium chloride  20 mEq Oral Q2H Sloan Nick MD     • QUEtiapine  25 mg Oral HS Carola Zimmer MD     • senna  1 tablet Oral HS PRN ALEX Ramsey     • sertraline  150 mg Oral Daily Carola Zimmer MD          Today, Patient Was Seen By: Sebastian Quick MD    ** Please Note: Dictation voice to text software may have been used in the creation of this document   **

## 2022-11-19 NOTE — ASSESSMENT & PLAN NOTE
Possibly due to HIT   Resolved  · Awaiting heparin antibody by serotonin release (performed 11/11)  · Heparin induced antibody elevated    Recent Labs     11/17/22  0504 11/18/22  0410 11/19/22  0728    826 606

## 2022-11-19 NOTE — ASSESSMENT & PLAN NOTE
Lab Results   Component Value Date    HGBA1C 9 8 (H) 10/25/2022     Recent Labs     11/18/22  1657 11/18/22  2057 11/19/22  0710 11/19/22  1113   POCGLU 164* 187* 121 134     · Inpatient regimen: Sliding scale, Glargine 15U HS

## 2022-11-19 NOTE — ASSESSMENT & PLAN NOTE
Uncontrolled diabetes, hypertension, hyperlipidemia  Lost to follow-up and poorly compliant to diabetic regimen  · S/p abdominal aortogram,  LLE SFA shockwave angioplasty/FATOU 10/31/22   · S/p R femoral endarterectomy 11/7  · S/p aspiration thrombectomy in right common iliac artery and left SFA  · Heparin drip transitioned to Argatroban in the setting of thrombocytopenia and concern for HIT  · Continue statin / Plavix  · PICC line in place  · Vascular intervention: Currently scheduled for L AKA Wednesday 11/23/22 with   [facility] Ron LIPSC[place]B [place]  · Vascular surgery: Discussed that hopefully can delay right sided amp for as long as possible to allow possible resolution of skin mottling/discoloration and blistering secondary to recent distal atheroembolization and thus attempt a  possible BKA  They understand that should her status change clinically may need to proceed more expeditiously  If this should occur she may require a guillotine amp on the right   They also understand that unfortunately there is a high chance that she ultimately may require bilateral AKAs

## 2022-11-19 NOTE — QUICK NOTE
Notified by lab hgb 5 3 this am  Spoke to RN who states kab had been drawn from PICC  agatroban has been running through this line but has been held prior  Patient without s/s of bleeding  VSS  Will attempt to obtain peripheral draw on opposite arm to ensure accuracy  repeat CBC and obtain type and screen

## 2022-11-19 NOTE — ASSESSMENT & PLAN NOTE
UA positive, but likely contaminant rather than an actual urinary tract infection  · Appreciate ID recommendations  Monitor off antibiotics

## 2022-11-19 NOTE — ASSESSMENT & PLAN NOTE
65year old female is currently admitted due to a moderate to large volume left MCA territory stroke  · Repeat CT head showed an evolving acute infarct in right parietal-temporal-occipital lobes with questionable petechial cortical hemorrhage in right parietal lobe  Discussed with Dr Elida Sanchez  Recommended that we continue anticoagulation for now due to the clinical insignificance of the size and the risk of taking her off anticoagulation far outweighs the benefits off of it  · Continue argatroban until surgical intervention has been completed

## 2022-11-19 NOTE — ASSESSMENT & PLAN NOTE
Uncontrolled diabetes, hypertension, hyperlipidemia  Lost to follow-up and poorly compliant to diabetic regimen  · S/p abdominal aortogram,  LLE SFA shockwave angioplasty/FATOU 10/31/22   · S/p R femoral endarterectomy 11/7  · S/p aspiration thrombectomy in right common iliac artery and left SFA  · Heparin drip transitioned to Argatroban in the setting of thrombocytopenia and concern for HIT  · Continue statin / Plavix  · PICC line in place  · Per cardiology may proceed with surgical intervention  Awaiting final schedule from vascular surgery  Tentative plan Monday versus Tuesday

## 2022-11-19 NOTE — ASSESSMENT & PLAN NOTE
Echo on 11/07 shows 56% LVEF, no diastolic dysfunction   Postprocedural hypoxia  · Currently on 1 5L NC

## 2022-11-20 LAB
APTT PPP: 71 SECONDS (ref 23–37)
APTT PPP: 76 SECONDS (ref 23–37)
GLUCOSE SERPL-MCNC: 135 MG/DL (ref 65–140)
GLUCOSE SERPL-MCNC: 150 MG/DL (ref 65–140)
GLUCOSE SERPL-MCNC: 160 MG/DL (ref 65–140)
GLUCOSE SERPL-MCNC: 186 MG/DL (ref 65–140)
GLUCOSE SERPL-MCNC: 188 MG/DL (ref 65–140)

## 2022-11-20 PROCEDURE — 85730 THROMBOPLASTIN TIME PARTIAL: CPT | Performed by: STUDENT IN AN ORGANIZED HEALTH CARE EDUCATION/TRAINING PROGRAM

## 2022-11-20 PROCEDURE — 97110 THERAPEUTIC EXERCISES: CPT

## 2022-11-20 PROCEDURE — 82948 REAGENT STRIP/BLOOD GLUCOSE: CPT

## 2022-11-20 PROCEDURE — 99232 SBSQ HOSP IP/OBS MODERATE 35: CPT | Performed by: STUDENT IN AN ORGANIZED HEALTH CARE EDUCATION/TRAINING PROGRAM

## 2022-11-20 PROCEDURE — 97530 THERAPEUTIC ACTIVITIES: CPT

## 2022-11-20 RX ADMIN — SERTRALINE HYDROCHLORIDE 150 MG: 100 TABLET ORAL at 08:10

## 2022-11-20 RX ADMIN — PANTOPRAZOLE SODIUM 40 MG: 40 TABLET, DELAYED RELEASE ORAL at 05:23

## 2022-11-20 RX ADMIN — NYSTATIN: 100000 POWDER TOPICAL at 17:20

## 2022-11-20 RX ADMIN — INSULIN LISPRO 1 UNITS: 100 INJECTION, SOLUTION INTRAVENOUS; SUBCUTANEOUS at 08:23

## 2022-11-20 RX ADMIN — ACETAMINOPHEN 650 MG: 325 TABLET, FILM COATED ORAL at 17:20

## 2022-11-20 RX ADMIN — OXYCODONE 5 MG: 5 TABLET ORAL at 06:04

## 2022-11-20 RX ADMIN — QUETIAPINE FUMARATE 25 MG: 25 TABLET ORAL at 21:59

## 2022-11-20 RX ADMIN — AMLODIPINE BESYLATE 10 MG: 10 TABLET ORAL at 08:10

## 2022-11-20 RX ADMIN — INSULIN LISPRO 1 UNITS: 100 INJECTION, SOLUTION INTRAVENOUS; SUBCUTANEOUS at 17:20

## 2022-11-20 RX ADMIN — NYSTATIN: 100000 POWDER TOPICAL at 08:23

## 2022-11-20 RX ADMIN — ACETAMINOPHEN 650 MG: 325 TABLET, FILM COATED ORAL at 23:40

## 2022-11-20 RX ADMIN — INSULIN GLARGINE 15 UNITS: 100 INJECTION, SOLUTION SUBCUTANEOUS at 21:59

## 2022-11-20 RX ADMIN — CLOPIDOGREL BISULFATE 75 MG: 75 TABLET ORAL at 08:10

## 2022-11-20 RX ADMIN — ALPRAZOLAM 0.25 MG: 0.25 TABLET ORAL at 11:51

## 2022-11-20 RX ADMIN — GABAPENTIN 200 MG: 100 CAPSULE ORAL at 17:19

## 2022-11-20 RX ADMIN — OXYCODONE 5 MG: 5 TABLET ORAL at 20:03

## 2022-11-20 RX ADMIN — ACETAMINOPHEN 650 MG: 325 TABLET, FILM COATED ORAL at 11:49

## 2022-11-20 RX ADMIN — ACETAMINOPHEN 650 MG: 325 TABLET, FILM COATED ORAL at 05:22

## 2022-11-20 RX ADMIN — DOXAZOSIN 2 MG: 2 TABLET ORAL at 08:10

## 2022-11-20 RX ADMIN — ARGATROBAN 0.1 MCG/KG/MIN: 50 INJECTION, SOLUTION INTRAVENOUS at 18:22

## 2022-11-20 RX ADMIN — ATORVASTATIN CALCIUM 40 MG: 40 TABLET, FILM COATED ORAL at 17:20

## 2022-11-20 RX ADMIN — GABAPENTIN 200 MG: 100 CAPSULE ORAL at 08:10

## 2022-11-20 RX ADMIN — HYDROMORPHONE HYDROCHLORIDE 0.5 MG: 1 INJECTION, SOLUTION INTRAMUSCULAR; INTRAVENOUS; SUBCUTANEOUS at 08:19

## 2022-11-20 RX ADMIN — OXYCODONE 5 MG: 5 TABLET ORAL at 10:25

## 2022-11-20 RX ADMIN — HYDROMORPHONE HYDROCHLORIDE 0.5 MG: 1 INJECTION, SOLUTION INTRAMUSCULAR; INTRAVENOUS; SUBCUTANEOUS at 14:08

## 2022-11-20 NOTE — ASSESSMENT & PLAN NOTE
Echo on 11/07 shows 40% LVEF, no diastolic dysfunction   Postprocedural hypoxia  · Currently on 1 5L NC

## 2022-11-20 NOTE — PROGRESS NOTES
[facility]  Progress Note - [address] 1960, 65 y o  female MRN: [id number]  Unit/Bed#: E4 -01 Encounter: [id number]  Primary Care Provider: ALEX Gamble   Date and time admitted to hospital: 10/21/2022  7:58 PM    * Stroke [facility])  Assessment & Plan  65year old female is currently admitted due to a moderate to large volume left MCA territory stroke  · Repeat CT head showed an evolving acute infarct in right parietal-temporal-occipital lobes with questionable petechial cortical hemorrhage in right parietal lobe  Discussed with Dr Campos Mt  Recommended that we continue anticoagulation for now due to the clinical insignificance of the size and the risk of taking her off anticoagulation far outweighs the benefits off of it  · Continue argatroban until surgical intervention has been completed  PAD (peripheral artery disease) (HCC)  Assessment & Plan  Uncontrolled diabetes, hypertension, hyperlipidemia  Lost to follow-up and poorly compliant to diabetic regimen  · S/p abdominal aortogram,  LLE SFA shockwave angioplasty/FATOU 10/31/22   · S/p R femoral endarterectomy 11/7  · S/p aspiration thrombectomy in right common iliac artery and left SFA  · Heparin drip transitioned to Argatroban in the setting of thrombocytopenia and concern for HIT  · Continue statin / Plavix  · PICC line in place  · Vascular intervention: Currently scheduled for L AKA Wednesday 11/23/22 with Dr Destiny Bustillos  · Vascular surgery: Discussed that hopefully can delay right sided amp for as long as possible to allow possible resolution of skin mottling/discoloration and blistering secondary to recent distal atheroembolization and thus attempt a  possible BKA  They understand that should her status change clinically may need to proceed more expeditiously  If this should occur she may require a guillotine amp on the right   They also understand that unfortunately there is a high chance that she ultimately may require bilateral AKAs    UTI (urinary tract infection)  Assessment & Plan  UA positive, but likely contaminant rather than an actual urinary tract infection  · Appreciate ID recommendations  Monitor off antibiotics  Thrombocytopenia ([facility] [address] )  Assessment & Plan  Possibly due to HIT  Resolved  · Awaiting heparin antibody by serotonin release (performed 11/11)  · Heparin induced antibody elevated    Recent Labs     11/18/22  0410 11/19/22  0728    384         Diabetic ulcer of heel associated with diabetes mellitus due to underlying condition [facility])  Assessment & Plan  Vascular surgery planning intervention on wednesday    Acute on chronic anemia  Assessment & Plan  S/p 2U pRBC  Once intervention date has been set, will likely have to transfuse her to 8 0 per my discussion with vascular surgery once timing has been finalized  Recent Labs     11/19/22  0728 11/19/22  1505   HGB 7 2* 7 2*         Hypokalemia  Assessment & Plan  Likely due to GI losses  Resolved  · Continue repleting PRN    Recent Labs     11/18/22  0401 11/18/22  0410 11/19/22  0549   K  --  3 4* 3 3*   MG 1 8  --  1 8         Type 2 diabetes mellitus with hyperglycemia, with long-term current use of insulin [facility])  Assessment & Plan  Lab Results   Component Value Date    HGBA1C 9 8 (H) 10/25/2022     Recent Labs     11/19/22  2344 11/20/22  0712 11/20/22  1118 11/20/22  1546   POCGLU 143* 160* 135 150*     · Inpatient regimen: Sliding scale, Glargine 15U HS    Benign essential hypertension  Assessment & Plan  Above goal  · Continue amlodipine, Cardura    Depression, recurrent (HCC)  Assessment & Plan  Evaluated by Psychiatry on this admission  · Continue Zoloft 150 mg daily / Quetiapine 25mg po Qhs    Acute respiratory failure with hypoxia ([address] )  Assessment & Plan  Echo on 11/07 shows 72% LVEF, no diastolic dysfunction   Postprocedural hypoxia  · Currently on 1 5L NC      VTE Pharmacologic Prophylaxis:   Pharmacologic: Heparin  Mechanical VTE Prophylaxis in Place: Yes    Discussions with Specialists or Other Care Team Provider: nursing    Education and Discussions with Family / Patient: patient    Time Spent for Care: 30 minutes  More than 50% of total time spent on counseling and coordination of care as described above  Current Length of Stay: 29 day(s)    Current Patient Status: Inpatient   Certification Statement: The patient will continue to require additional inpatient hospital stay due to vascularintervention    Discharge Plan: active    Code Status: Level 1 - Full Code      Subjective:   Patient seen and examined at bedside  Comfortable  No new complaints overnight  Objective:     Vitals:   Temp (24hrs), Av 5 °F (36 9 °C), Min:98 2 °F (36 8 °C), Max:98 7 °F (37 1 °C)    Temp:  [98 2 °F (36 8 °C)-98 7 °F (37 1 °C)] 98 2 °F (36 8 °C)  HR:  [80-90] 80  Resp:  [16-20] 20  BP: (165-173)/(69-76) 165/69  SpO2:  [93 %-95 %] 95 %  Body mass index is 39 2 kg/m²  Input and Output Summary (last 24 hours): Intake/Output Summary (Last 24 hours) at 2022 1559  Last data filed at 2022 0529  Gross per 24 hour   Intake 60 93 ml   Output 600 ml   Net -539 07 ml       Physical Exam:     Physical Exam  Vitals reviewed  Constitutional:       General: She is not in acute distress  HENT:      Head: Normocephalic  Nose: Nose normal       Mouth/Throat:      Mouth: Mucous membranes are moist    Eyes:      General: No scleral icterus  Cardiovascular:      Rate and Rhythm: Normal rate  Pulmonary:      Effort: Pulmonary effort is normal  No respiratory distress  Abdominal:      Palpations: Abdomen is soft  Tenderness: There is no abdominal tenderness  Musculoskeletal:      Comments: BLLE dressing in place   Skin:     General: Skin is warm  Neurological:      Mental Status: She is alert  Mental status is at baseline     Psychiatric:         Mood and Affect: Mood normal          Behavior: Behavior normal        Additional Data:     Labs:    Results from last 7 days   Lab Units 11/19/22  1505 11/19/22  0728 11/18/22  0410   WBC Thousand/uL  --  16 96* 20 85*   HEMOGLOBIN g/dL 7 2* 7 2* 7 1*   HEMATOCRIT % 23 3* 23 4* 23 1*   PLATELETS Thousands/uL  --  384 384   NEUTROS PCT %  --   --  81*   LYMPHS PCT %  --   --  7*   MONOS PCT %  --   --  8   EOS PCT %  --   --  2     Results from last 7 days   Lab Units 11/19/22  0549   SODIUM mmol/L 140   POTASSIUM mmol/L 3 3*   CHLORIDE mmol/L 103   CO2 mmol/L 30   BUN mg/dL 9   CREATININE mg/dL 1 01   ANION GAP mmol/L 7   CALCIUM mg/dL 7 7*   GLUCOSE RANDOM mg/dL 124         Results from last 7 days   Lab Units 11/20/22  1546 11/20/22  1118 11/20/22  0712 11/19/22  2344 11/19/22  2059 11/19/22  1549 11/19/22  1113 11/19/22  0710 11/18/22  2057 11/18/22  1657 11/18/22  1103 11/18/22  0744   POC GLUCOSE mg/dl 150* 135 160* 143* 186* 136 134 121 187* 164* 145* 129         Results from last 7 days   Lab Units 11/15/22  0559 11/14/22  0619   PROCALCITONIN ng/ml 0 93* 1 00*           * I Have Reviewed All Lab Data Listed Above  * Additional Pertinent Lab Tests Reviewed:  [address] Admission Reviewed      Lines:  Invasive Devices     Peripherally Inserted Central Catheter Line  Duration           PICC Line 26/33/98 Right Basilic 2 days          Drain  Duration           Urethral Catheter Non-latex 18 Fr  3 days               Imaging:    Imaging Reports Reviewed Today Include: no new imaging    Recent Cultures (last 7 days):           Last 24 Hours Medication List:   Current Facility-Administered Medications   Medication Dose Route Frequency Provider Last Rate   • acetaminophen  650 mg Oral Q6H [facility] Sahra Chavez PA-C     • ALPRAZolam  0 25 mg Oral Daily PRN Jessica Mirza MD     • amLODIPine  10 mg Oral Daily Benjamín Archer MD     • argatroban  0 1-3 mcg/kg/min Intravenous Titrated Jessica Mirza MD 0 1 mcg/kg/min (11/19/22 3873)   • atorvastatin  40 mg Oral Daily With Andrew Rosales MD     • [facility] Hold] chlorhexidine  15 mL Swish & Spit Once Mina Thorne DO     • clopidogrel  75 mg Oral Daily Shazia Alvarado MD     • doxazosin  2 mg Oral Daily Shazia Alvarado MD     • gabapentin  200 mg Oral BID Shazia Alvarado MD     • heparin 2000 units and papaverine 60 mg in isolyte equivalent 500 mL irrigation bag   Irrigation Once Harshil Luong MD     • HYDROmorphone  0 5 mg Intravenous Q3H PRN John Crews PA-C     • insulin glargine  15 Units Subcutaneous HS Shazia Alvarado MD     • insulin lispro  1-6 Units Subcutaneous TID AC Shazia Alvarado MD     • naloxone  0 04 mg Intravenous Q1MIN PRN Shazia Alvarado MD     • nystatin   Topical BID Shazia Alvarado MD     • ondansetron  4 mg Intravenous Q6H PRN Shazia Alvarado MD     • oxyCODONE  5 mg Oral Q4H PRN Long Fairbanks MD     • pantoprazole  40 mg Oral Early Morning Shazia Alvarado MD     • polyethylene glycol  17 g Oral Daily PRN Shazia Alvarado MD     • QUEtiapine  25 mg Oral HS Shazia Alvarado MD     • senna  1 tablet Oral HS PRN ALEX Mcmahan     • sertraline  150 mg Oral Daily Shazia Alvarado MD          Today, Patient Was Seen By: Pebbles Lee MD    ** Please Note: Dictation voice to text software may have been used in the creation of this document   **

## 2022-11-20 NOTE — ASSESSMENT & PLAN NOTE
Possibly due to HIT   Resolved  · Awaiting heparin antibody by serotonin release (performed 11/11)  · Heparin induced antibody elevated    Recent Labs     11/18/22  0410 11/19/22  0728    384

## 2022-11-20 NOTE — PLAN OF CARE
Problem: PHYSICAL THERAPY ADULT  Goal: Performs mobility at highest level of function for planned discharge setting  See evaluation for individualized goals  Description: Treatment/Interventions: Functional transfer training, LE strengthening/ROM, Therapeutic exercise, Endurance training, Patient/family training, Equipment eval/education, Bed mobility, Compensatory technique education, Gait training, Continued evaluation, Spoke to nursing, Spoke to MD, Spoke to case management, OT          See flowsheet documentation for full assessment, interventions and recommendations  Outcome: Progressing  Note: Prognosis: Fair  Problem List: Decreased strength, Decreased range of motion, Decreased endurance, Impaired balance, Decreased mobility, Obesity, Decreased skin integrity, Pain, Decreased cognition, Decreased safety awareness, Impaired judgement, Impaired vision  Assessment: Pt  Seen for PT treatment session  Pt  rec'd supine in be  Pt  Agreeable to PT  Pt performs supine b/l nathalie a-arom x 10 reps  Pt tolerated fair - well, pt performs bed mobility of rolling R with use of bed rail with max assist x1, supine to sit with max assist x2 , assist for trunk support and b/l le management  Pt performs static sitting eob x 3-4 minutes with mod-min assist x1 with unilateral ue support with use of bed rail on  Right  Pt   not seated squarely but at an beverley at EOB,  Pt with mild posterior lean  Decreased sitting tolerance noted  Pt  returned to supine with max assist x2, repostioned with total assist x2, pillows placed under R le to elevate an to position r le in neutral rotation  Pt continues to be easily distractible requiring redirection to task  Pt  With  L sided neglect/ inattention,  Pt tracks to the left and turns head toward the left with increased time and cues  Pt  Will requires STr at dc to address impairments in order to maximize functional outcomes   The patient's AM-PAC Basic Mobility Inpatient Short Form Raw Score is 6  A Raw score of less than or equal to 16 suggests the patient may benefit from discharge to post-acute rehabilitation services  Please also refer to the recommendation of the Physical Therapist for safe discharge planning  Barriers to Discharge: Inaccessible home environment  Barriers to Discharge Comments: alone  PT Discharge Recommendation: Post acute rehabilitation services    See flowsheet documentation for full assessment

## 2022-11-20 NOTE — PHYSICAL THERAPY NOTE
PHYSICAL THERAPY NOTE          Patient Name: Jessica Dubon   IVFUK'HERIBERTO Date: 11/20/2022 11/20/22 1350   Note Type   Note Type Treatment   Pain Assessment   Pain Assessment Tool 0-10   Pain Score 4   Pain Location/Orientation Orientation: Bilateral;Location: Leg   Hospital Pain Intervention(s) Repositioned; Ambulation/increased activity; Emotional support   Restrictions/Precautions   RLE Weight Bearing Per Order WBAT   LLE Weight Bearing Per Order WBAT   Other Precautions Cognitive; Chair Alarm; Bed Alarm; Fall Risk;Pain;O2;Multiple lines;WBS  (pa cath, iv)   General   Chart Reviewed Yes   Family/Caregiver Present Yes   Cognition   Overall Cognitive Status Impaired   Arousal/Participation Responsive; Cooperative; Alert   Attention Attends with cues to redirect   Memory Decreased recall of precautions;Decreased recall of recent events;Decreased short term memory   Following Commands Follows one step commands without difficulty   Subjective   Subjective Pt reports 4/10 pain in b/l le's with mobility/ ther ex   Bed Mobility   Rolling R 2  Maximal assistance   Additional items Assist x 1;HOB elevated; Bedrails; Increased time required;Verbal cues;LE management   Supine to Sit 2  Maximal assistance   Additional items Assist x 2;HOB elevated; Bedrails;Verbal cues; Increased time required;LE management   Sit to Supine 2  Maximal assistance   Additional items Assist x 2; Increased time required;Verbal cues;LE management   Balance   Static Sitting Poor +   Dynamic Sitting Poor   Static Standing Zero   Endurance Deficit   Endurance Deficit Description pain, fatigue, generalized weakness   Activity Tolerance   Activity Tolerance Patient limited by pain; Patient limited by fatigue   Exercises   Quad Sets Supine;10 reps;Bilateral   Heelslides 10 reps;AAROM; Right;Left   Hip Flexion Supine;10 reps;AAROM;AROM; Right;Left   Hip Abduction Supine;10 reps;AAROM; Right;Left   Hip Adduction Supine;10 reps;AAROM; Right;Left   Knee AROM Short Arc Quad Supine;10 reps;AROM; Right;Left   Knee AROM Long Arc [employer] reps;AROM; Right;Left   Balance training  pt  peformed static sitting balance/ tolerance activites at EOB x 3-4 minutes with mod-min assist x1 for trunk support with use of bedrail on r for support  Assessment   Prognosis Fair   Problem List Decreased strength;Decreased range of motion;Decreased endurance; Impaired balance;Decreased mobility;Obesity; Decreased skin integrity;Pain;Decreased cognition;Decreased safety awareness; Impaired judgement; Impaired vision   Assessment Pt  Seen for PT treatment session  Pt  rec'd supine in be  Pt  Agreeable to PT  Pt performs supine b/l nathalie a-arom x 10 reps  Pt tolerated fair - well, pt performs bed mobility of rolling R with use of bed rail with max assist x1, supine to sit with max assist x2 , assist for trunk support and b/l le management  Pt performs static sitting eob x 3-4 minutes with mod-min assist x1 with unilateral ue support with use of bed rail on  Right  Pt   not seated squarely but at an beverley at EOB,  Pt with mild posterior lean  Decreased sitting tolerance noted  Pt  returned to supine with max assist x2, repostioned with total assist x2, pillows placed under R le to elevate an to position r le in neutral rotation  Pt continues to be easily distractible requiring redirection to task  Pt  With  L sided neglect/ inattention,  Pt tracks to the left and turns head toward the left with increased time and cues  Pt  Will requires STr at dc to address impairments in order to maximize functional outcomes  The patient's AM-PAC Basic Mobility Inpatient Short Form Raw Score is 6  A Raw score of less than or equal to 16 suggests the patient may benefit from discharge to post-acute rehabilitation services  Please also refer to the recommendation of the Physical Therapist for safe discharge planning     Goals Patient Goals To be able To get out of bed to and chair  STG Expiration Date 11/29/22   PT Treatment Day 5   Plan   Treatment/Interventions LE strengthening/ROM; Therapeutic exercise; Endurance training;Patient/family training;Equipment eval/education; Bed mobility;Spoke to nursing;Family   Progress Slow progress, decreased activity tolerance   PT Frequency 3-5x/wk   Recommendation   PT Discharge Recommendation Post acute rehabilitation services   AM-PAC Basic Mobility Inpatient   Turning in Bed Without Bedrails 1   Lying on Back to Sitting on Edge of Flat Bed 1   Moving Bed to Chair 1   Standing Up From Chair 1   Walk in Room 1   Climb 3-5 Stairs 1   Basic Mobility Inpatient Raw Score 6   Turning Head Towards Sound 3   Follow Simple Instructions 3   Low Function Basic Mobility Raw Score 12   Low Function Basic Mobility Standardized Score 18 33   Highest Level Of Mobility   -HL Goal 2: Bed activities/Dependent transfer   -HL Achieved 3: Sit at edge of bed   Education   Education Provided Mobility training;Home exercise program   Patient Reinforcement needed   End of Consult   Patient Position at End of Consult Supine;Bed/Chair alarm activated; All needs within reach   End of Consult Comments R le elevated on pillow in supine  11/20/22 1350   Note Type   Note Type Treatment   Pain Assessment   Pain Assessment Tool 0-10   Pain Score 4   Pain Location/Orientation Orientation: Bilateral;Location: Leg   Hospital Pain Intervention(s) Repositioned; Ambulation/increased activity; Emotional support   Restrictions/Precautions   RLE Weight Bearing Per Order WBAT   LLE Weight Bearing Per Order WBAT   Other Precautions Cognitive; Chair Alarm; Bed Alarm; Fall Risk;Pain;O2;Multiple lines;WBS  (pa cath, iv)   General   Chart Reviewed Yes   Family/Caregiver Present Yes   Cognition   Overall Cognitive Status Impaired   Arousal/Participation Responsive; Cooperative; Alert   Attention Attends with cues to redirect   Memory Decreased recall of precautions;Decreased recall of recent events;Decreased short term memory   Following Commands Follows one step commands without difficulty   Subjective   Subjective Pt reports 4/10 pain in b/l le's with mobility/ ther ex   Bed Mobility   Rolling R 2  Maximal assistance   Additional items Assist x 1;HOB elevated; Bedrails; Increased time required;Verbal cues;LE management   Supine to Sit 2  Maximal assistance   Additional items Assist x 2;HOB elevated; Bedrails;Verbal cues; Increased time required;LE management   Sit to Supine 2  Maximal assistance   Additional items Assist x 2; Increased time required;Verbal cues;LE management   Balance   Static Sitting Poor +   Dynamic Sitting Poor   Static Standing Zero   Endurance Deficit   Endurance Deficit Description pain, fatigue, generalized weakness   Activity Tolerance   Activity Tolerance Patient limited by pain; Patient limited by fatigue   Exercises   Quad Sets Supine;10 reps;Bilateral   Heelslides 10 reps;AAROM; Right;Left   Hip Flexion Supine;10 reps;AAROM;AROM; Right;Left   Hip Abduction Supine;10 reps;AAROM; Right;Left   Hip Adduction Supine;10 reps;AAROM; Right;Left   Knee AROM Short Arc Quad Supine;10 reps;AROM; Right;Left   Knee AROM Long Arc [employer] reps;AROM; Right;Left   Balance training  pt  peformed static sitting balance/ tolerance activites at EOB x 3-4 minutes with mod-min assist x1 for trunk support with use of bedrail on r for support  Assessment   Prognosis Fair   Problem List Decreased strength;Decreased range of motion;Decreased endurance; Impaired balance;Decreased mobility;Obesity; Decreased skin integrity;Pain;Decreased cognition;Decreased safety awareness; Impaired judgement; Impaired vision   Assessment Pt  Seen for PT treatment session  Pt  rec'd supine in be  Pt  Agreeable to PT  Pt performs supine b/l nathalie a-arom x 10 reps    Pt tolerated fair - well, pt performs bed mobility of rolling R with use of bed rail with max assist x1, supine to sit with max assist x2 , assist for trunk support and b/l le management  Pt performs static sitting eob x 3-4 minutes with mod-min assist x1 with unilateral ue support with use of bed rail on  Right  Pt   not seated squarely but at an beverley at EOB,  Pt with mild posterior lean  Decreased sitting tolerance noted  Pt  returned to supine with max assist x2, repostioned with total assist x2, pillows placed under R le to elevate an to position r le in neutral rotation  Pt continues to be easily distractible requiring redirection to task  Pt  With  L sided neglect/ inattention,  Pt tracks to the left and turns head toward the left with increased time and cues  Pt  Will requires STr at dc to address impairments in order to maximize functional outcomes  The patient's AM-Located within [facility] Basic Mobility Inpatient Short Form Raw Score is 6  A Raw score of less than or equal to 16 suggests the patient may benefit from discharge to post-acute rehabilitation services  Please also refer to the recommendation of the Physical Therapist for safe discharge planning  Goals   Patient Goals To be able To get out of bed to and chair  STG Expiration Date 11/29/22   PT Treatment Day 5   Plan   Treatment/Interventions LE strengthening/ROM; Therapeutic exercise; Endurance training;Patient/family training;Equipment eval/education; Bed mobility;Spoke to nursing;Family   Progress Slow progress, decreased activity tolerance   PT Frequency 3-5x/wk   Recommendation   PT Discharge Recommendation Post acute rehabilitation services   AM-Located within [facility] Basic Mobility Inpatient   Turning in Bed Without Bedrails 1   Lying on Back to Sitting on Edge of Flat Bed 1   Moving Bed to Chair 1   Standing Up From Chair 1   Walk in Room 1   Climb 3-5 Stairs 1   Basic Mobility Inpatient Raw Score 6   Turning Head Towards Sound 3   Follow Simple Instructions 3   Low Function Basic Mobility Raw Score 12   Low Function Basic Mobility Standardized Score 18 33   Highest Level Of Mobility   -HLM Goal 2: Bed activities/Dependent transfer   -HLM Achieved 3: Sit at edge of bed   Education   Education Provided Mobility training;Home exercise program   Patient Reinforcement needed   End of Consult   Patient Position at End of Consult Supine;Bed/Chair alarm activated; All needs within reach   End of Consult Comments R le elevated on pillow in supine     Antonella Segovia

## 2022-11-20 NOTE — ASSESSMENT & PLAN NOTE
Likely due to GI losses   Resolved  · Continue repleting PRN    Recent Labs     11/18/22  0401 11/18/22  0410 11/19/22  0549   K  --  3 4* 3 3*   MG 1 8  --  1 8

## 2022-11-20 NOTE — ASSESSMENT & PLAN NOTE
S/p 2U pRBC  Once intervention date has been set, will likely have to transfuse her to 8 0 per my discussion with vascular surgery once timing has been finalized      Recent Labs     11/19/22  0728 11/19/22  1505   HGB 7 2* 7 2*

## 2022-11-20 NOTE — ASSESSMENT & PLAN NOTE
65year old female is currently admitted due to a moderate to large volume left MCA territory stroke  · Repeat CT head showed an evolving acute infarct in right parietal-temporal-occipital lobes with questionable petechial cortical hemorrhage in right parietal lobe  Discussed with Dr Jason Sears  Recommended that we continue anticoagulation for now due to the clinical insignificance of the size and the risk of taking her off anticoagulation far outweighs the benefits off of it  · Continue argatroban until surgical intervention has been completed

## 2022-11-20 NOTE — ASSESSMENT & PLAN NOTE
Lab Results   Component Value Date    HGBA1C 9 8 (H) 10/25/2022     Recent Labs     11/19/22  2344 11/20/22  0712 11/20/22  1118 11/20/22  1546   POCGLU 143* 160* 135 150*     · Inpatient regimen: Sliding scale, Glargine 15U HS

## 2022-11-21 ENCOUNTER — ANESTHESIA EVENT (INPATIENT)
Dept: PERIOP | Facility: HOSPITAL | Age: 62
End: 2022-11-21

## 2022-11-21 LAB
ANION GAP SERPL CALCULATED.3IONS-SCNC: 6 MMOL/L (ref 4–13)
APTT PPP: 64 SECONDS (ref 23–37)
APTT PPP: 73 SECONDS (ref 23–37)
BASOPHILS # BLD AUTO: 0.03 THOUSANDS/ÂΜL (ref 0–0.1)
BASOPHILS NFR BLD AUTO: 0 % (ref 0–1)
BUN SERPL-MCNC: 8 MG/DL (ref 5–25)
CALCIUM SERPL-MCNC: 7.7 MG/DL (ref 8.3–10.1)
CHLORIDE SERPL-SCNC: 104 MMOL/L (ref 96–108)
CO2 SERPL-SCNC: 30 MMOL/L (ref 21–32)
CREAT SERPL-MCNC: 0.81 MG/DL (ref 0.6–1.3)
EOSINOPHIL # BLD AUTO: 0.31 THOUSAND/ÂΜL (ref 0–0.61)
EOSINOPHIL NFR BLD AUTO: 2 % (ref 0–6)
ERYTHROCYTE [DISTWIDTH] IN BLOOD BY AUTOMATED COUNT: 15.4 % (ref 11.6–15.1)
GFR SERPL CREATININE-BSD FRML MDRD: 78 ML/MIN/1.73SQ M
GLUCOSE SERPL-MCNC: 140 MG/DL (ref 65–140)
GLUCOSE SERPL-MCNC: 152 MG/DL (ref 65–140)
GLUCOSE SERPL-MCNC: 153 MG/DL (ref 65–140)
GLUCOSE SERPL-MCNC: 156 MG/DL (ref 65–140)
GLUCOSE SERPL-MCNC: 211 MG/DL (ref 65–140)
HCT VFR BLD AUTO: 22.3 % (ref 34.8–46.1)
HGB BLD-MCNC: 6.8 G/DL (ref 11.5–15.4)
IMM GRANULOCYTES # BLD AUTO: 0.17 THOUSAND/UL (ref 0–0.2)
IMM GRANULOCYTES NFR BLD AUTO: 1 % (ref 0–2)
LYMPHOCYTES # BLD AUTO: 1.11 THOUSANDS/ÂΜL (ref 0.6–4.47)
LYMPHOCYTES NFR BLD AUTO: 8 % (ref 14–44)
MAGNESIUM SERPL-MCNC: 1.8 MG/DL (ref 1.6–2.6)
MCH RBC QN AUTO: 27.5 PG (ref 26.8–34.3)
MCHC RBC AUTO-ENTMCNC: 30.9 G/DL (ref 31.4–37.4)
MCV RBC AUTO: 89 FL (ref 82–98)
MONOCYTES # BLD AUTO: 0.95 THOUSAND/ÂΜL (ref 0.17–1.22)
MONOCYTES NFR BLD AUTO: 7 % (ref 4–12)
NEUTROPHILS # BLD AUTO: 11.46 THOUSANDS/ÂΜL (ref 1.85–7.62)
NEUTS SEG NFR BLD AUTO: 82 % (ref 43–75)
NRBC BLD AUTO-RTO: 0 /100 WBCS
PLATELET # BLD AUTO: 445 THOUSANDS/UL (ref 149–390)
PMV BLD AUTO: 8.7 FL (ref 8.9–12.7)
POTASSIUM SERPL-SCNC: 3.8 MMOL/L (ref 3.5–5.3)
RBC # BLD AUTO: 2.51 MILLION/UL (ref 3.81–5.12)
SODIUM SERPL-SCNC: 140 MMOL/L (ref 135–147)
WBC # BLD AUTO: 14.03 THOUSAND/UL (ref 4.31–10.16)

## 2022-11-21 PROCEDURE — 99232 SBSQ HOSP IP/OBS MODERATE 35: CPT | Performed by: INTERNAL MEDICINE

## 2022-11-21 PROCEDURE — 85025 COMPLETE CBC W/AUTO DIFF WBC: CPT | Performed by: STUDENT IN AN ORGANIZED HEALTH CARE EDUCATION/TRAINING PROGRAM

## 2022-11-21 PROCEDURE — 80048 BASIC METABOLIC PNL TOTAL CA: CPT | Performed by: STUDENT IN AN ORGANIZED HEALTH CARE EDUCATION/TRAINING PROGRAM

## 2022-11-21 PROCEDURE — 82948 REAGENT STRIP/BLOOD GLUCOSE: CPT

## 2022-11-21 PROCEDURE — P9016 RBC LEUKOCYTES REDUCED: HCPCS

## 2022-11-21 PROCEDURE — 99232 SBSQ HOSP IP/OBS MODERATE 35: CPT | Performed by: STUDENT IN AN ORGANIZED HEALTH CARE EDUCATION/TRAINING PROGRAM

## 2022-11-21 PROCEDURE — 97530 THERAPEUTIC ACTIVITIES: CPT

## 2022-11-21 PROCEDURE — 83735 ASSAY OF MAGNESIUM: CPT | Performed by: STUDENT IN AN ORGANIZED HEALTH CARE EDUCATION/TRAINING PROGRAM

## 2022-11-21 PROCEDURE — 85730 THROMBOPLASTIN TIME PARTIAL: CPT | Performed by: STUDENT IN AN ORGANIZED HEALTH CARE EDUCATION/TRAINING PROGRAM

## 2022-11-21 RX ADMIN — INSULIN LISPRO 1 UNITS: 100 INJECTION, SOLUTION INTRAVENOUS; SUBCUTANEOUS at 08:33

## 2022-11-21 RX ADMIN — QUETIAPINE FUMARATE 25 MG: 25 TABLET ORAL at 21:25

## 2022-11-21 RX ADMIN — SERTRALINE HYDROCHLORIDE 150 MG: 100 TABLET ORAL at 08:33

## 2022-11-21 RX ADMIN — CLOPIDOGREL BISULFATE 75 MG: 75 TABLET ORAL at 08:33

## 2022-11-21 RX ADMIN — INSULIN LISPRO 2 UNITS: 100 INJECTION, SOLUTION INTRAVENOUS; SUBCUTANEOUS at 11:16

## 2022-11-21 RX ADMIN — ATORVASTATIN CALCIUM 40 MG: 40 TABLET, FILM COATED ORAL at 17:49

## 2022-11-21 RX ADMIN — ACETAMINOPHEN 650 MG: 325 TABLET, FILM COATED ORAL at 17:49

## 2022-11-21 RX ADMIN — DOXAZOSIN 2 MG: 2 TABLET ORAL at 08:33

## 2022-11-21 RX ADMIN — OXYCODONE 5 MG: 5 TABLET ORAL at 12:56

## 2022-11-21 RX ADMIN — INSULIN LISPRO 1 UNITS: 100 INJECTION, SOLUTION INTRAVENOUS; SUBCUTANEOUS at 17:49

## 2022-11-21 RX ADMIN — ACETAMINOPHEN 650 MG: 325 TABLET, FILM COATED ORAL at 05:17

## 2022-11-21 RX ADMIN — PANTOPRAZOLE SODIUM 40 MG: 40 TABLET, DELAYED RELEASE ORAL at 05:17

## 2022-11-21 RX ADMIN — INSULIN GLARGINE 15 UNITS: 100 INJECTION, SOLUTION SUBCUTANEOUS at 21:25

## 2022-11-21 RX ADMIN — OXYCODONE 5 MG: 5 TABLET ORAL at 17:50

## 2022-11-21 RX ADMIN — NYSTATIN: 100000 POWDER TOPICAL at 17:51

## 2022-11-21 RX ADMIN — GABAPENTIN 200 MG: 100 CAPSULE ORAL at 08:33

## 2022-11-21 RX ADMIN — GABAPENTIN 200 MG: 100 CAPSULE ORAL at 17:49

## 2022-11-21 RX ADMIN — ACETAMINOPHEN 650 MG: 325 TABLET, FILM COATED ORAL at 11:16

## 2022-11-21 RX ADMIN — NYSTATIN: 100000 POWDER TOPICAL at 08:33

## 2022-11-21 RX ADMIN — AMLODIPINE BESYLATE 10 MG: 10 TABLET ORAL at 08:33

## 2022-11-21 NOTE — ASSESSMENT & PLAN NOTE
Echo on 11/07 shows 23% LVEF, no diastolic dysfunction   Postprocedural hypoxia  · Currently on 1 5L NC

## 2022-11-21 NOTE — ASSESSMENT & PLAN NOTE
Uncontrolled diabetes, hypertension, hyperlipidemia  Lost to follow-up and poorly compliant to diabetic regimen  · S/p abdominal aortogram,  LLE SFA shockwave angioplasty/FATOU 10/31/22   · S/p R femoral endarterectomy 11/7  · S/p aspiration thrombectomy in right common iliac artery and left SFA  · Heparin drip transitioned to Argatroban in the setting of thrombocytopenia and concern for HIT  · Continue statin / Plavix  · PICC line in place  · Vascular intervention: Currently scheduled for L AKA Wednesday 11/23/22 with Dr Mariann Almaraz  · Vascular surgery: Discussed that hopefully can delay right sided amp for as long as possible to allow possible resolution of skin mottling/discoloration and blistering secondary to recent distal atheroembolization and thus attempt a  possible BKA  They understand that should her status change clinically may need to proceed more expeditiously  If this should occur she may require a guillotine amp on the right   They also understand that unfortunately there is a high chance that she ultimately may require bilateral AKAs

## 2022-11-21 NOTE — UTILIZATION REVIEW
Continued Stay Review    Date: 11/21                      Current Patient Class: IP Current Level of Care: MS    HPI:62 y o  female initially admitted on 10/22    Assessment/Plan:   Pt had large L MCA stroke  She remains on Argatroban drip until surgical intervention (L AKA) is completed  Currently scheduled for 11/23  Has lingering leukocytosis which could be d/t tissue necrosis & ischemia  She is off antibiotics x 4 days  Leukocytosis downtrending  Continues to use both oral and parenteral analgesia        Vital Signs:   11/21/22 1447 98 8 °F (37 1 °C) 88 18 140/71 97 90 % -- -- Nasal cannula Lying   11/21/22 1402 98 7 °F (37 1 °C) 88 20 184/86 Abnormal  -- 90 % -- -- None (Room air) --   11/21/22 1145 97 8 °F (36 6 °C) 83 20 191/108 Abnormal  -- 92 % -- -- None (Room air) --   11/21/22 1122 98 3 °F (36 8 °C) 77 18 152/67 98 91 % -- -- None (Room air) Lying   11/21/22 0847 -- -- -- -- -- -- 26 1 5 L/min Nasal cannula --   11/21/22 0711 97 7 °F (36 5 °C) 78 17 175/76 Abnormal  109 95 % 26 1 5 L/min Nasal cannula Lying   11/20/22 2300 99 2 °F (37 3 °C) 93 21 149/117 Abnormal  128 95 % 26 1 5 L/min Nasal cannula Lying   11/20/22 2003 98 8 °F (37 1 °C) 93 21 157/74 105 93 % -- -- Nasal cannula Lying   11/20/22 1449 98 2 °F (36 8 °C) 80 20 165/69 -- 95 % 26 1 5 L/min Nasal cannula --   11/20/22 0709 98 5 °F (36 9 °C) 90 18 173/74 Abnormal  104 94 % 28 2 L/min Nasal cannula Lying   11/19/22 2346 98 7 °F (37 1 °C) 90 16 169/76 103 93 % 28 2 L/min Nasal cannula Lying   11/19/22 1451 98 9 °F (37 2 °C) 78 20 166/77 -- 95 % -- -- Nasal cannula Lying   11/19/22 1300 -- -- -- -- -- -- -- -- Nasal cannula --   11/19/22 0736 98 °F (36 7 °C) 71 20 140/65 93 95 % -- -- Nasal cannula Lying   11/19/22 0040 97 7 °F (36 5 °C) 75 20 149/71 83 100 % 26 1 5 L/min Nasal cannula Lying     Pertinent Labs/Diagnostic Results:       Results from last 7 days   Lab Units 11/21/22  0520 11/19/22  1505 11/19/22  0728 11/18/22  0410 11/17/22  0504   WBC Thousand/uL 14 03*  --  16 96* 20 85* 20 50*   HEMOGLOBIN g/dL 6 8* 7 2* 7 2* 7 1* 7 5*   HEMATOCRIT % 22 3* 23 3* 23 4* 23 1* 24 8*   PLATELETS Thousands/uL 445*  --  384 384 382   NEUTROS ABS Thousands/µL 11 46*  --   --  17 07*  --      Results from last 7 days   Lab Units 11/15/22  0559   RETIC CT ABS  54,500   RETIC CT PCT % 2 34*     Results from last 7 days   Lab Units 11/21/22  0520 11/19/22  0549 11/18/22  0410 11/18/22  0401 11/17/22  0504 11/16/22  0904 11/15/22  0559   SODIUM mmol/L 140 140 139  --  140 136 139   POTASSIUM mmol/L 3 8 3 3* 3 4*  --  3 2* 3 5 3 5   CHLORIDE mmol/L 104 103 103  --  102 100 101   CO2 mmol/L 30 30 31  --  32 29 29   ANION GAP mmol/L 6 7 5  --  6 7 9   BUN mg/dL 8 9 10  --  12 14 16   CREATININE mg/dL 0 81 1 01 1 10  --  1 06 0 96 0 95   EGFR ml/min/1 73sq m 78 60 54  --  56 64 64   CALCIUM mg/dL 7 7* 7 7* 7 8*  --  8 2* 7 8* 7 5*   MAGNESIUM mg/dL 1 8 1 8  --  1 8 1 8  --  1 8   PHOSPHORUS mg/dL  --  4 3*  --   --   --   --  3 9         Results from last 7 days   Lab Units 11/21/22  1600 11/21/22  1059 11/21/22  0710 11/20/22  2158 11/20/22  1546 11/20/22  1118 11/20/22  0712 11/19/22  2344 11/19/22  2059 11/19/22  1549 11/19/22  1113 11/19/22  0710   POC GLUCOSE mg/dl 156* 211* 152* 188* 150* 135 160* 143* 186* 136 134 121     Results from last 7 days   Lab Units 11/21/22  0520 11/19/22  0549 11/18/22  0410 11/17/22  0504 11/16/22  0904 11/15/22  0559   GLUCOSE RANDOM mg/dL 140 124 142* 126 151* 134     Results from last 7 days   Lab Units 11/21/22  0520 11/20/22  1730 11/20/22  0425   PTT seconds 64* 71* 76*         Results from last 7 days   Lab Units 11/15/22  0559   PROCALCITONIN ng/ml 0 93*     Results from last 7 days   Lab Units 11/15/22  0559   FERRITIN ng/mL 824*     Medications:   Scheduled Medications:  acetaminophen, 650 mg, Oral, Q6H [facility]  amLODIPine, 10 mg, Oral, Daily  atorvastatin, 40 mg, Oral, Daily With Dinner  [MAR Hold] chlorhexidine, 15 mL, Swish & Spit, Once  clopidogrel, 75 mg, Oral, Daily  doxazosin, 2 mg, Oral, Daily  gabapentin, 200 mg, Oral, BID  heparin 2000 units and papaverine 60 mg in isolyte equivalent 500 mL irrigation bag, , Irrigation, Once  insulin glargine, 15 Units, Subcutaneous, HS  insulin lispro, 1-6 Units, Subcutaneous, TID AC  nystatin, , Topical, BID  pantoprazole, 40 mg, Oral, Early Morning  QUEtiapine, 25 mg, Oral, HS  sertraline, 150 mg, Oral, Daily      Continuous IV Infusions:  argatroban, 0 1-3 mcg/kg/min, Intravenous, Titrated      PRN Meds:  ALPRAZolam, 0 25 mg, Oral, Daily PRN - x 1 11/20  HYDROmorphone, 0 5 mg, Intravenous, Q3H PRN - x 2 11/20, x 1 11/19  naloxone, 0 04 mg, Intravenous, Q1MIN PRN  ondansetron, 4 mg, Intravenous, Q6H PRN  oxyCODONE, 5 mg, Oral, Q4H PRN - x 3 11/20, 11/19  polyethylene glycol, 17 g, Oral, Daily PRN  senna, 1 tablet, Oral, HS PRN      Discharge Plan: rehab    Network Utilization Review Department  ATTENTION: Please call with any questions or concerns to [phone] and carefully listen to the prompts so that you are directed to the right person  All voicemails are confidential   Shine Figueroa all requests for admission clinical reviews, approved or denied determinations and any other requests to dedicated fax number below belonging to the campus where the patient is receiving treatment   List of dedicated fax numbers for the Facilities:  [address] DENIALS (Administrative/Medical Necessity) [phone]   [address] [place] [address] (Maternity/NICU/Pediatrics) [phone]   [address] [phone]   [facility] [phone]   [facility] [phone]   [address] [place] [address] [address] [facility] [address] [facility] [phone] [address] [place] [address] [facility] [phone]

## 2022-11-21 NOTE — PROGRESS NOTES
Progress Note - Infectious Disease   Tani Tom 65 y o  female MRN: [id number]  Unit/Bed#: E4 -01 Encounter: [id number]    Impression/Plan:  1  Recurring fevers  Last week patient with 2 isolated fevers  Suspect patient may be reacting to ongoing leg issues and coagulation issues  No evidence of clear infection at this time  WBC count continues to improve  B/L leg wounds stable  Blood cultures representing contamination and not active bacteremia  She is afebrile today  No further work up indicated at this time    -monitor patient off antibiotics  -monitor CBCD and BMP  -monitor vitals     2  Asymptomatic bacteriuria  Urine culture added onto previous nephrology UA collected on 11/12/2022 after patient developed fever on 11/13/2022  There was growth of enterobacter and she was given Ceftriaxone  I have low suspicion this finding represents active UTI  Fever likely reactive to emboli as patient stroked later that day, vs ongoing leg issues  Given low suspicion for acute UTI antibiotic treatment was discontinued  Pa was exchanged   -monitor patient off antibiotics  -monitor CBCD and BMP  -serial exams and care of pa catheter  -monitor for  symptoms  -monitor urine output     3  Coag negative staph bacteremia  Staph epidermidis present in one set of blood cultures drawn on 11/13/2022  Suspect this is a contaminant and not representative of active bacteremia  Patient has had TTE during neurology stroke work up and there was no evidence of valvular vegetation/endocarditis  No indication for repeat blood cultures or additional work up at this time      4  Stroke  CT head imaging revealing acute R hemispheric stroke on 11/13/2022 after patient experienced change in mental status  Neurology was following  Stroke likely embolic in nature   CTA demonstrated moderate stenosis of the bilateral ICAs and vertebrals  No vegetations noted on TTE   Current blood cultures not reflective of active bacteremia    -serial neuro exams  -monitor mood and mental status  -supportive care     5  Severe PAD with multiple B/L LE atheroembolic events and blistering wounds  Patient is following with podiatry, wound management, and vascular surgery  She is scheduled for L AKA on 2022  R lower extremity amputation planning is pending    -continue follow up with wound management  -continue follow up with podiatry  -continue follow up with vascular surgery     6  Type 2 diabetes mellitus with long term insulin use  Patient's last HbA1c was 9 8% on 10/25/2022  Elevated blood glucose is risk factor for wounds and infection  Recommend tight glycemic control   -blood glucose management per primary service      7  Morbid obesity  BMI = 37 88      Given there are no ongoing infectious disease concerns the infectious disease team will sign off at this time  Please call with new questions or concerns  Above plan was discussed in detail with patient at the bedside  Above plan was discussed in detail with SLIM  Antibiotics:  No current antibiotic use  Subjective:  Patient reports she's okay this morning  Feels she's avoiding thinking about her leg surgery as it upsets her a lot  She continues to report B/L leg pain, R>L  She has no fever, chills, sweats, shakes; no nausea, vomiting, abdominal pain, diarrhea; no cough, shortness of breath, or chest pain  She has no concerns with her pa  No new symptoms  Objective:  Vitals:  Temp:  [98 2 °F (36 8 °C)-99 2 °F (37 3 °C)] 99 2 °F (37 3 °C)  HR:  [80-93] 93  Resp:  [18-21] 21  BP: (149-173)/() 149/117  SpO2:  [93 %-95 %] 95 %  Temp (24hrs), Av 7 °F (37 1 °C), Min:98 2 °F (36 8 °C), Max:99 2 °F (37 3 °C)  Current: Temperature: 99 2 °F (37 3 °C)    Physical Exam:   General Appearance:  Alert, interactive and less confused, nontoxic, in no acute distress  Appears comfortable sitting up in bed  Seems to have L visual field deficit based on how she is handling her breakfast tray  Throat: Oropharynx moist without lesions  Lungs:   Clear to auscultation bilaterally; no wheezes, rhonchi or rales; respirations unlabored on room air  Heart:  Tachycarcid; no murmur, rub or gallop  Abdomen:   Soft, obese, non-tender, non-distended, positive bowel sounds  Genitourinary: Jacinto intact, urine output yellow without sediment  Skin: No new rashes noted on exposed skin       Labs, Imaging, & Other studies:   All pertinent labs and imaging studies were personally reviewed  Results from last 7 days   Lab Units 11/19/22  1505 11/19/22  0728 11/18/22  0410 11/17/22  0504   WBC Thousand/uL  --  16 96* 20 85* 20 50*   HEMOGLOBIN g/dL 7 2* 7 2* 7 1* 7 5*   PLATELETS Thousands/uL  --  384 384 382     Results from last 7 days   Lab Units 11/21/22  0520   POTASSIUM mmol/L 3 8   CHLORIDE mmol/L 104   CO2 mmol/L 30   BUN mg/dL 8   CREATININE mg/dL 0 81   EGFR ml/min/1 73sq m 78   CALCIUM mg/dL 7 7*

## 2022-11-21 NOTE — PROGRESS NOTES
[facility]  Progress Note - [address] 1960, 65 y o  female MRN: [id number]  Unit/Bed#: E4 -01 Encounter: [id number]  Primary Care Provider: ALEX Son   Date and time admitted to hospital: 10/21/2022  7:58 PM    * Stroke [facility])  Assessment & Plan  65year old female is currently admitted due to a moderate to large volume left MCA territory stroke  · Repeat CT head showed an evolving acute infarct in right parietal-temporal-occipital lobes with questionable petechial cortical hemorrhage in right parietal lobe  Discussed with Dr Courtney Jules  Recommended that we continue anticoagulation for now due to the clinical insignificance of the size and the risk of taking her off anticoagulation far outweighs the benefits off of it  · Continue argatroban until surgical intervention has been completed  PAD (peripheral artery disease) (HCC)  Assessment & Plan  Uncontrolled diabetes, hypertension, hyperlipidemia  Lost to follow-up and poorly compliant to diabetic regimen  · S/p abdominal aortogram,  LLE SFA shockwave angioplasty/FATOU 10/31/22   · S/p R femoral endarterectomy 11/7  · S/p aspiration thrombectomy in right common iliac artery and left SFA  · Heparin drip transitioned to Argatroban in the setting of thrombocytopenia and concern for HIT  · Continue statin / Plavix  · PICC line in place  · Vascular intervention: Currently scheduled for L AKA Wednesday 11/23/22 with Dr Srinivas Haji  · Vascular surgery: Discussed that hopefully can delay right sided amp for as long as possible to allow possible resolution of skin mottling/discoloration and blistering secondary to recent distal atheroembolization and thus attempt a  possible BKA  They understand that should her status change clinically may need to proceed more expeditiously  If this should occur she may require a guillotine amp on the right   They also understand that unfortunately there is a high chance that she ultimately may require bilateral AKAs    UTI (urinary tract infection)  Assessment & Plan  UA positive, but likely contaminant rather than an actual urinary tract infection  · Appreciate ID recommendations  Monitor off antibiotics  Thrombocytopenia ([facility] [address] )  Assessment & Plan  Possibly due to HIT  Resolved  · Awaiting heparin antibody by serotonin release (performed 11/11)  · Heparin induced antibody elevated    Recent Labs     11/19/22  0728 11/21/22  0520    445*         Diabetic ulcer of heel associated with diabetes mellitus due to underlying condition [facility])  Assessment & Plan  Vascular surgery planning intervention on wednesday    Acute on chronic anemia  Assessment & Plan  S/p 2U pRBC  Will transfuse her again today due to hemoglobin levels less than 7    Recent Labs     11/19/22  1505 11/21/22  0520   HGB 7 2* 6 8*         Hypokalemia  Assessment & Plan  Likely due to GI losses  Resolved  · Continue repleting PRN    Recent Labs     11/19/22  0549 11/21/22  0520   K 3 3* 3 8   MG 1 8 1 8         Type 2 diabetes mellitus with hyperglycemia, with long-term current use of insulin [facility])  Assessment & Plan  Lab Results   Component Value Date    HGBA1C 9 8 (H) 10/25/2022     Recent Labs     11/20/22  1546 11/20/22  2158 11/21/22  0710 11/21/22  1059   POCGLU 150* 188* 152* 211*     · Inpatient regimen: Sliding scale, Glargine 15U HS    Benign essential hypertension  Assessment & Plan  Above goal  · Continue amlodipine, Cardura    Depression, recurrent (HCC)  Assessment & Plan  Evaluated by Psychiatry on this admission  · Continue Zoloft 150 mg daily / Quetiapine 25mg po Qhs    Acute respiratory failure with hypoxia ([facility] [address] )  Assessment & Plan  Echo on 11/07 shows 49% LVEF, no diastolic dysfunction   Postprocedural hypoxia  · Currently on 1 5L NC      VTE Pharmacologic Prophylaxis:   Pharmacologic: Heparin  Mechanical VTE Prophylaxis in Place: Yes    Discussions with Specialists or Other Care Team Provider: nursing    Education and Discussions with Family / Patient: patient    Time Spent for Care: 45 minutes  More than 50% of total time spent on counseling and coordination of care as described above  Current Length of Stay: 30 day(s)    Current Patient Status: Inpatient   Certification Statement: The patient will continue to require additional inpatient hospital stay due to transfusion, vascular intervention    Discharge Plan: once procedure has been done    Code Status: Level 1 - Full Code      Subjective:   Patient seen and examined at bedside  No new issues overnight  Denies bleeding  Objective:     Vitals:   Temp (24hrs), Av 3 °F (36 8 °C), Min:97 7 °F (36 5 °C), Max:99 2 °F (37 3 °C)    Temp:  [97 7 °F (36 5 °C)-99 2 °F (37 3 °C)] 97 8 °F (36 6 °C)  HR:  [77-93] 83  Resp:  [17-21] 20  BP: (149-191)/() 191/108  SpO2:  [91 %-95 %] 92 %  Body mass index is 37 88 kg/m²  Input and Output Summary (last 24 hours): Intake/Output Summary (Last 24 hours) at 2022 1217  Last data filed at 2022 0501  Gross per 24 hour   Intake 9 01 ml   Output 950 ml   Net -940 99 ml       Physical Exam:     Physical Exam  Vitals reviewed  Constitutional:       General: She is not in acute distress  HENT:      Head: Normocephalic  Nose: Nose normal       Mouth/Throat:      Mouth: Mucous membranes are moist    Eyes:      General: No scleral icterus  Cardiovascular:      Rate and Rhythm: Normal rate  Pulmonary:      Effort: Pulmonary effort is normal  No respiratory distress  Abdominal:      General: There is no distension  Palpations: Abdomen is soft  Tenderness: There is no abdominal tenderness  Musculoskeletal:      Comments: Bilateral lower extremities in place   Skin:     General: Skin is warm  Neurological:      Mental Status: She is alert  Mental status is at baseline     Psychiatric:         Mood and Affect: Mood normal          Behavior: Behavior normal        Additional Data:     Labs:    Results from last 7 days   Lab Units 11/21/22  0520   WBC Thousand/uL 14 03*   HEMOGLOBIN g/dL 6 8*   HEMATOCRIT % 22 3*   PLATELETS Thousands/uL 445*   NEUTROS PCT % 82*   LYMPHS PCT % 8*   MONOS PCT % 7   EOS PCT % 2     Results from last 7 days   Lab Units 11/21/22  0520   SODIUM mmol/L 140   POTASSIUM mmol/L 3 8   CHLORIDE mmol/L 104   CO2 mmol/L 30   BUN mg/dL 8   CREATININE mg/dL 0 81   ANION GAP mmol/L 6   CALCIUM mg/dL 7 7*   GLUCOSE RANDOM mg/dL 140         Results from last 7 days   Lab Units 11/21/22  1059 11/21/22  0710 11/20/22  2158 11/20/22  1546 11/20/22  1118 11/20/22  0712 11/19/22  2344 11/19/22  2059 11/19/22  1549 11/19/22  1113 11/19/22  0710 11/18/22  2057   POC GLUCOSE mg/dl 211* 152* 188* 150* 135 160* 143* 186* 136 134 121 187*         Results from last 7 days   Lab Units 11/15/22  0559   PROCALCITONIN ng/ml 0 93*           * I Have Reviewed All Lab Data Listed Above  * Additional Pertinent Lab Tests Reviewed:  [address] Admission Reviewed      Lines:  Invasive Devices     Peripherally Inserted Central Catheter Line  Duration           PICC Line 31/93/25 Right Basilic 2 days          Drain  Duration           Urethral Catheter Non-latex 18 Fr  4 days               Imaging:    Imaging Reports Reviewed Today Include: no new imaging    Recent Cultures (last 7 days):           Last 24 Hours Medication List:   Current Facility-Administered Medications   Medication Dose Route Frequency Provider Last Rate   • acetaminophen  650 mg Oral Q6H [address] Mckay Frazier PA-C     • ALPRAZolam  0 25 mg Oral Daily PRN Carlos Clement MD     • amLODIPine  10 mg Oral Daily Shae Lim MD     • argatroban  0 1-3 mcg/kg/min Intravenous Titrated Carlos Clement MD 0 1 mcg/kg/min (11/20/22 1822)   • atorvastatin  40 mg Oral Daily With Jeff Lopez MD     • [facility] Hold] chlorhexidine  15 mL Swish & Spit Once Malika Carpenter DO     • clopidogrel  75 mg Oral Daily Carola Zimmer MD     • doxazosin  2 mg Oral Daily Carola Zimmer MD     • gabapentin  200 mg Oral BID Shae Monroe MD     • heparin 2000 units and papaverine 60 mg in isolyte equivalent 500 mL irrigation bag   Irrigation Once Nadira Lake MD     • HYDROmorphone  0 5 mg Intravenous Q3H PRN Pilo Moreau PA-C     • insulin glargine  15 Units Subcutaneous HS Carola Zimmer MD     • insulin lispro  1-6 Units Subcutaneous TID AC Carola Zimmer MD     • naloxone  0 04 mg Intravenous Q1MIN PRN Carola Zimmer MD     • nystatin   Topical BID Carola Zimmer MD     • ondansetron  4 mg Intravenous Q6H PRN Carola Zimmer MD     • oxyCODONE  5 mg Oral Q4H PRN Sloan Nick MD     • pantoprazole  40 mg Oral Early Morning Carola Zimmer MD     • polyethylene glycol  17 g Oral Daily PRN Carola Zimmer MD     • QUEtiapine  25 mg Oral HS Carola Zimmer MD     • senna  1 tablet Oral HS PRN ALEX Ramsey     • sertraline  150 mg Oral Daily Carola Zimmer MD          Today, Patient Was Seen By: Sebastian Quick MD    ** Please Note: Dictation voice to text software may have been used in the creation of this document   **

## 2022-11-21 NOTE — ASSESSMENT & PLAN NOTE
Lab Results   Component Value Date    HGBA1C 9 8 (H) 10/25/2022     Recent Labs     11/20/22  1546 11/20/22  2158 11/21/22  0710 11/21/22  1059   POCGLU 150* 188* 152* 211*     · Inpatient regimen: Sliding scale, Glargine 15U HS

## 2022-11-21 NOTE — OCCUPATIONAL THERAPY NOTE
Occupational Therapy Progress Note     Patient Name: Tani Tom  [id number] Date: 11/21/2022  Problem List  Principal Problem:    Stroke [facility])  Active Problems:    Acute respiratory failure with hypoxia ([facility] )    Depression, recurrent ([facility] [address] )    Benign essential hypertension    Type 2 diabetes mellitus with hyperglycemia, with long-term current use of insulin ([facility])    Wound of lower extremity    Hypokalemia    PAD (peripheral artery disease) ([facility])    Acute on chronic anemia    Diabetic ulcer of heel associated with diabetes mellitus due to underlying condition ([facility])    Thrombocytopenia ([facility])    UTI (urinary tract infection)    Diarrhea          11/21/22 1548   OT Last Visit   OT Visit Date 11/21/22   Note Type   Note Type Treatment   Pain Assessment   Pain Assessment Tool 0-10   Pain Score 4   Pain Location/Orientation Orientation: Bilateral;Location: Leg   Hospital Pain Intervention(s) Repositioned; Emotional support   Restrictions/Precautions   Weight Bearing Precautions Per Order Yes   RLE Weight Bearing Per Order WBAT   LLE Weight Bearing Per Order WBAT   Braces or Orthoses   (globoped shoe L LE and surgical shoe R LE)   Other Precautions Cognitive; Chair Alarm; Bed Alarm; Fall Risk;Pain;WBS;O2;Multiple lines  (2LO2)   ADL   Where Assessed Supine, bed   Grooming Assistance 4  Minimal Assistance   Grooming Deficit Setup;Supervision/safety;Verbal cueing; Increased time to complete;Wash/dry face   Grooming Comments increased time to complete   Bed Mobility   Supine to Sit 2  Maximal assistance   Additional items Assist x 1; Increased time required;Verbal cues; Bedrails;HOB elevated  (long sitting)   Additional Comments long sitting in bed x max assist x2 trials 15sec and 20sec and mod assist w/ b/l bed rail supports w/ MOD assist x 30 sec   Therapeutic Exercise - ROM   UE-ROM Yes   ROM- Right Upper Extremities   R Shoulder AROM; Flexion; Horizontal ABduction; Extension   R Elbow AROM;Elbow flexion;Elbow extension  (punchouts)   R Weight/Reps/Sets 2 set x 10 reps   RUE ROM Comment tolerated well w/ cues for correct techniques   ROM - Left Upper Extremities    L Shoulder AROM; Flexion; Extension   L Elbow AROM;Elbow flexion;Elbow extension  (punchouts)   L Weight/Reps/Sets 2 sets x 10 reps   LUE ROM Comment tolerated well w/ cues for correct techniques   Subjective   Subjective "are you going to do therapy with me after I lose my leg", educated pt we will continue to provide therapy   Cognition   Overall Cognitive Status WFL   Arousal/Participation Responsive; Cooperative   Attention Attends with cues to redirect   Orientation Level Oriented to person;Oriented to place;Oriented to time  (not specific date)   Memory Decreased short term memory;Decreased recall of recent events   Following Commands Follows one step commands without difficulty   Comments engages in appropriate conversations, pt motivated to work w/ therapy and states "once I lose my leg I want to be able to transport to w/c and be able to get to my kitchen by myself"   Additional Activities   Additional Activities   (improved visual tracking, still w/ left peripheral deficits)   Activity Tolerance   Activity Tolerance Patient limited by fatigue;Patient limited by pain;Treatment limited secondary to medical complications (Comment)   Medical Staff Made Aware appropriate to see per Lexy HERNÁNDEZ   Assessment   Assessment Pt seen for skilled OT session focused on ADLs, long sitting in bed and b/l UE exercises  Pt w/ improvement in affect and motivated to get better  Pt w/ long sitting in bed x max assist w/ b/l UEs on bed rails x2 trials 15sec and 20sec and mod assist w/ b/l bed rail supports w/ MOD assist x 30 sec  Pt w/ setup grooming to wash face  Pt completed b/l UE exercises seen above to increase strength and endurance for ADLs, functional transfers and mobility   Pt w/ b/l heels offloaded in bed and bed pad place underneath due to weeping of wounds and RN aware  Pt w/ all needs met at end of session and alarm intact and wedges on b/l sides to promote upright posture  Pt continues to be limited due to decreased strength and endurance, impaired skin integrity, fall risk, multiple lines, impaired activity tolerance, left peripheral vision deficits, all causing a decline in ADLs, functional transfers and mobility  Recommend STR when medically stable  Will continue to follow  The patient's raw score on the AM-PAC Daily Activity inpatient short form is 12, standardized score is 30 6, less than 39 4  Patients at this level are likely to benefit from discharge to post-acute rehabilitation services  Please refer to the recommendation of the Occupational Therapist for safe discharge planning  Plan   Treatment Interventions ADL retraining;Functional transfer training;UE strengthening/ROM; Endurance training;Cognitive reorientation;Patient/family training;Equipment evaluation/education; Compensatory technique education; Energy conservation; Activityengagement   Goal Expiration Date 11/29/22   OT Treatment Day 8   OT Frequency 3-5x/wk   Recommendation   OT Discharge Recommendation Post acute rehabilitation services   AM-formerly [facility] Daily Activity Inpatient   Lower Body Dressing 1   Bathing 2   Toileting 1   Upper Body Dressing 2   Grooming 3   Eating 3   Daily Activity Raw Score 12   Daily Activity Standardized Score (Calc for Raw Score >=11) 30 6   [facility] Applied Cognition Inpatient   Following a Speech/Presentation 3   Understanding Ordinary Conversation 4   Taking Medications 3   Remembering Where Things Are Placed or Put Away 3   Remembering List of 4-5 Errands 2   Taking Care of Complicated Tasks 2   Applied Cognition Raw Score 17   Applied Cognition Standardized Score 36 52     Documentation completed by: Elzbieta Jeter MS, OTR/L

## 2022-11-21 NOTE — ASSESSMENT & PLAN NOTE
Possibly due to HIT   Resolved  · Awaiting heparin antibody by serotonin release (performed 11/11)  · Heparin induced antibody elevated    Recent Labs     11/19/22  0728 11/21/22  0520    445*

## 2022-11-21 NOTE — ASSESSMENT & PLAN NOTE
65year old female is currently admitted due to a moderate to large volume left MCA territory stroke  · Repeat CT head showed an evolving acute infarct in right parietal-temporal-occipital lobes with questionable petechial cortical hemorrhage in right parietal lobe  Discussed with Dr Adrianne Haley  Recommended that we continue anticoagulation for now due to the clinical insignificance of the size and the risk of taking her off anticoagulation far outweighs the benefits off of it  · Continue argatroban until surgical intervention has been completed

## 2022-11-21 NOTE — ASSESSMENT & PLAN NOTE
Likely due to GI losses   Resolved  · Continue repleting PRN    Recent Labs     11/19/22  0549 11/21/22  0520   K 3 3* 3 8   MG 1 8 1 8

## 2022-11-21 NOTE — PLAN OF CARE
Problem: OCCUPATIONAL THERAPY ADULT  Goal: Performs self-care activities at highest level of function for planned discharge setting  See evaluation for individualized goals  Description: Treatment Interventions: ADL retraining, Functional transfer training, UE strengthening/ROM, Endurance training, Cognitive reorientation, Patient/family training, Equipment evaluation/education, Compensatory technique education, Energy conservation, Activityengagement          See flowsheet documentation for full assessment, interventions and recommendations  Outcome: Progressing  Note: Limitation: Decreased ADL status, Decreased UE strength, Decreased endurance, Decreased high-level ADLs (New L visual field deficits)  Prognosis: Good  Assessment: Pt seen for skilled OT session focused on ADLs, long sitting in bed and b/l UE exercises  Pt w/ improvement in affect and motivated to get better  Pt w/ long sitting in bed x max assist w/ b/l UEs on bed rails x2 trials 15sec and 20sec and mod assist w/ b/l bed rail supports w/ MOD assist x 30 sec  Pt w/ setup grooming to wash face  Pt completed b/l UE exercises seen above to increase strength and endurance for ADLs, functional transfers and mobility  Pt w/ b/l heels offloaded in bed and bed pad place underneath due to weeping of wounds and RN aware  Pt w/ all needs met at end of session and alarm intact and wedges on b/l sides to promote upright posture  Pt continues to be limited due to decreased strength and endurance, impaired skin integrity, fall risk, multiple lines, impaired activity tolerance, left peripheral vision deficits, all causing a decline in ADLs, functional transfers and mobility  Recommend STR when medically stable  Will continue to follow  The patient's raw score on the AM-PAC Daily Activity inpatient short form is 12, standardized score is 30 6, less than 39 4  Patients at this level are likely to benefit from discharge to post-acute rehabilitation services  Please refer to the recommendation of the Occupational Therapist for safe discharge planning       OT Discharge Recommendation: Post acute rehabilitation services

## 2022-11-21 NOTE — ASSESSMENT & PLAN NOTE
S/p 2U pRBC   Will transfuse her again today due to hemoglobin levels less than 7    Recent Labs     11/19/22  1505 11/21/22  0520   HGB 7 2* 6 8*

## 2022-11-22 LAB
ABO GROUP BLD BPU: NORMAL
ABO GROUP BLD: NORMAL
ANION GAP SERPL CALCULATED.3IONS-SCNC: 7 MMOL/L (ref 4–13)
APTT PPP: 58 SECONDS (ref 23–37)
APTT PPP: 72 SECONDS (ref 23–37)
BASOPHILS # BLD AUTO: 0.04 THOUSANDS/ÂΜL (ref 0–0.1)
BASOPHILS NFR BLD AUTO: 0 % (ref 0–1)
BLD GP AB SCN SERPL QL: NEGATIVE
BPU ID: NORMAL
BUN SERPL-MCNC: 7 MG/DL (ref 5–25)
CALCIUM SERPL-MCNC: 7.7 MG/DL (ref 8.3–10.1)
CHLORIDE SERPL-SCNC: 105 MMOL/L (ref 96–108)
CO2 SERPL-SCNC: 30 MMOL/L (ref 21–32)
CREAT SERPL-MCNC: 0.79 MG/DL (ref 0.6–1.3)
CROSSMATCH: NORMAL
EOSINOPHIL # BLD AUTO: 0.28 THOUSAND/ÂΜL (ref 0–0.61)
EOSINOPHIL NFR BLD AUTO: 2 % (ref 0–6)
ERYTHROCYTE [DISTWIDTH] IN BLOOD BY AUTOMATED COUNT: 15.6 % (ref 11.6–15.1)
GFR SERPL CREATININE-BSD FRML MDRD: 81 ML/MIN/1.73SQ M
GLUCOSE SERPL-MCNC: 116 MG/DL (ref 65–140)
GLUCOSE SERPL-MCNC: 134 MG/DL (ref 65–140)
GLUCOSE SERPL-MCNC: 134 MG/DL (ref 65–140)
GLUCOSE SERPL-MCNC: 141 MG/DL (ref 65–140)
GLUCOSE SERPL-MCNC: 167 MG/DL (ref 65–140)
HCT VFR BLD AUTO: 26.3 % (ref 34.8–46.1)
HGB BLD-MCNC: 7.9 G/DL (ref 11.5–15.4)
IMM GRANULOCYTES # BLD AUTO: 0.14 THOUSAND/UL (ref 0–0.2)
IMM GRANULOCYTES NFR BLD AUTO: 1 % (ref 0–2)
LYMPHOCYTES # BLD AUTO: 1.33 THOUSANDS/ÂΜL (ref 0.6–4.47)
LYMPHOCYTES NFR BLD AUTO: 9 % (ref 14–44)
MAGNESIUM SERPL-MCNC: 1.7 MG/DL (ref 1.6–2.6)
MCH RBC QN AUTO: 26.7 PG (ref 26.8–34.3)
MCHC RBC AUTO-ENTMCNC: 30 G/DL (ref 31.4–37.4)
MCV RBC AUTO: 89 FL (ref 82–98)
MONOCYTES # BLD AUTO: 0.91 THOUSAND/ÂΜL (ref 0.17–1.22)
MONOCYTES NFR BLD AUTO: 6 % (ref 4–12)
NEUTROPHILS # BLD AUTO: 11.41 THOUSANDS/ÂΜL (ref 1.85–7.62)
NEUTS SEG NFR BLD AUTO: 82 % (ref 43–75)
NRBC BLD AUTO-RTO: 0 /100 WBCS
PLATELET # BLD AUTO: 421 THOUSANDS/UL (ref 149–390)
PMV BLD AUTO: 8.4 FL (ref 8.9–12.7)
POTASSIUM SERPL-SCNC: 3.5 MMOL/L (ref 3.5–5.3)
RBC # BLD AUTO: 2.96 MILLION/UL (ref 3.81–5.12)
RH BLD: NEGATIVE
SODIUM SERPL-SCNC: 142 MMOL/L (ref 135–147)
SPECIMEN EXPIRATION DATE: NORMAL
UNIT DISPENSE STATUS: NORMAL
UNIT PRODUCT CODE: NORMAL
UNIT PRODUCT VOLUME: 350 ML
UNIT RH: NORMAL
WBC # BLD AUTO: 14.11 THOUSAND/UL (ref 4.31–10.16)

## 2022-11-22 PROCEDURE — 80048 BASIC METABOLIC PNL TOTAL CA: CPT | Performed by: STUDENT IN AN ORGANIZED HEALTH CARE EDUCATION/TRAINING PROGRAM

## 2022-11-22 PROCEDURE — 86901 BLOOD TYPING SEROLOGIC RH(D): CPT | Performed by: PHYSICIAN ASSISTANT

## 2022-11-22 PROCEDURE — 86850 RBC ANTIBODY SCREEN: CPT | Performed by: PHYSICIAN ASSISTANT

## 2022-11-22 PROCEDURE — 85025 COMPLETE CBC W/AUTO DIFF WBC: CPT | Performed by: STUDENT IN AN ORGANIZED HEALTH CARE EDUCATION/TRAINING PROGRAM

## 2022-11-22 PROCEDURE — 99232 SBSQ HOSP IP/OBS MODERATE 35: CPT | Performed by: STUDENT IN AN ORGANIZED HEALTH CARE EDUCATION/TRAINING PROGRAM

## 2022-11-22 PROCEDURE — 82948 REAGENT STRIP/BLOOD GLUCOSE: CPT

## 2022-11-22 PROCEDURE — 86900 BLOOD TYPING SEROLOGIC ABO: CPT | Performed by: PHYSICIAN ASSISTANT

## 2022-11-22 PROCEDURE — 85730 THROMBOPLASTIN TIME PARTIAL: CPT | Performed by: STUDENT IN AN ORGANIZED HEALTH CARE EDUCATION/TRAINING PROGRAM

## 2022-11-22 PROCEDURE — 83735 ASSAY OF MAGNESIUM: CPT | Performed by: STUDENT IN AN ORGANIZED HEALTH CARE EDUCATION/TRAINING PROGRAM

## 2022-11-22 PROCEDURE — P9016 RBC LEUKOCYTES REDUCED: HCPCS

## 2022-11-22 RX ORDER — CEFAZOLIN SODIUM 2 G/50ML
2000 SOLUTION INTRAVENOUS ONCE
Status: COMPLETED | OUTPATIENT
Start: 2022-11-23 | End: 2022-11-23

## 2022-11-22 RX ORDER — CHLORHEXIDINE GLUCONATE 0.12 MG/ML
15 RINSE ORAL ONCE
Status: COMPLETED | OUTPATIENT
Start: 2022-11-22 | End: 2022-11-23

## 2022-11-22 RX ORDER — CEFAZOLIN SODIUM 2 G/50ML
2000 SOLUTION INTRAVENOUS ONCE
Status: DISCONTINUED | OUTPATIENT
Start: 2022-11-22 | End: 2022-11-22

## 2022-11-22 RX ADMIN — GABAPENTIN 200 MG: 100 CAPSULE ORAL at 18:17

## 2022-11-22 RX ADMIN — AMLODIPINE BESYLATE 10 MG: 10 TABLET ORAL at 09:16

## 2022-11-22 RX ADMIN — SERTRALINE HYDROCHLORIDE 150 MG: 100 TABLET ORAL at 09:16

## 2022-11-22 RX ADMIN — CLOPIDOGREL BISULFATE 75 MG: 75 TABLET ORAL at 09:16

## 2022-11-22 RX ADMIN — INSULIN LISPRO 1 UNITS: 100 INJECTION, SOLUTION INTRAVENOUS; SUBCUTANEOUS at 12:30

## 2022-11-22 RX ADMIN — QUETIAPINE FUMARATE 25 MG: 25 TABLET ORAL at 22:03

## 2022-11-22 RX ADMIN — INSULIN GLARGINE 15 UNITS: 100 INJECTION, SOLUTION SUBCUTANEOUS at 22:03

## 2022-11-22 RX ADMIN — ACETAMINOPHEN 650 MG: 325 TABLET, FILM COATED ORAL at 18:17

## 2022-11-22 RX ADMIN — ACETAMINOPHEN 650 MG: 325 TABLET, FILM COATED ORAL at 12:12

## 2022-11-22 RX ADMIN — ACETAMINOPHEN 650 MG: 325 TABLET, FILM COATED ORAL at 00:42

## 2022-11-22 RX ADMIN — PANTOPRAZOLE SODIUM 40 MG: 40 TABLET, DELAYED RELEASE ORAL at 05:46

## 2022-11-22 RX ADMIN — ACETAMINOPHEN 650 MG: 325 TABLET, FILM COATED ORAL at 05:46

## 2022-11-22 RX ADMIN — DOXAZOSIN 2 MG: 2 TABLET ORAL at 09:16

## 2022-11-22 RX ADMIN — NYSTATIN: 100000 POWDER TOPICAL at 09:27

## 2022-11-22 RX ADMIN — OXYCODONE 5 MG: 5 TABLET ORAL at 13:45

## 2022-11-22 RX ADMIN — ATORVASTATIN CALCIUM 40 MG: 40 TABLET, FILM COATED ORAL at 16:12

## 2022-11-22 RX ADMIN — GABAPENTIN 200 MG: 100 CAPSULE ORAL at 09:16

## 2022-11-22 RX ADMIN — OXYCODONE 5 MG: 5 TABLET ORAL at 05:46

## 2022-11-22 RX ADMIN — HYDROMORPHONE HYDROCHLORIDE 0.5 MG: 1 INJECTION, SOLUTION INTRAMUSCULAR; INTRAVENOUS; SUBCUTANEOUS at 16:20

## 2022-11-22 RX ADMIN — ALPRAZOLAM 0.25 MG: 0.25 TABLET ORAL at 00:42

## 2022-11-22 NOTE — ASSESSMENT & PLAN NOTE
Echo on 11/07 shows 68% LVEF, no diastolic dysfunction   Postprocedural hypoxia  · Currently on 1 5L NC

## 2022-11-22 NOTE — ASSESSMENT & PLAN NOTE
Uncontrolled diabetes, hypertension, hyperlipidemia  Lost to follow-up and poorly compliant to diabetic regimen  · S/p abdominal aortogram,  LLE SFA shockwave angioplasty/FATOU 10/31/22   · S/p R femoral endarterectomy 11/7  · S/p aspiration thrombectomy in right common iliac artery and left SFA  · Heparin drip transitioned to Argatroban in the setting of thrombocytopenia and concern for HIT     · Continue statin / Plavix  · PICC line in place  · Vascular intervention: Currently scheduled for L AKA 11/23/22 with Dr Malgorzata Carter

## 2022-11-22 NOTE — PROGRESS NOTES
[facility]  Progress Note - [address] 1960, 65 y o  female MRN: [id number]  Unit/Bed#: E4 -01 Encounter: [id number]  Primary Care Provider: [address], CRNP   Date and time admitted to hospital: 10/21/2022  7:58 PM    * Stroke [facility])  Assessment & Plan  65year old female is currently admitted due to a moderate to large volume left MCA territory stroke  · Repeat CT head showed an evolving acute infarct in right parietal-temporal-occipital lobes with questionable petechial cortical hemorrhage in right parietal lobe  Discussed with Dr Adrien Walter  Recommended that we continue anticoagulation for now due to the clinical insignificance of the size and the risk of taking her off anticoagulation far outweighs the benefits off of it  · Continue argatroban until surgical intervention has been completed  PAD (peripheral artery disease) ([facility])  Assessment & Plan  Uncontrolled diabetes, hypertension, hyperlipidemia  Lost to follow-up and poorly compliant to diabetic regimen  · S/p abdominal aortogram,  LLE SFA shockwave angioplasty/FATOU 10/31/22   · S/p R femoral endarterectomy 11/7  · S/p aspiration thrombectomy in right common iliac artery and left SFA  · Heparin drip transitioned to Argatroban in the setting of thrombocytopenia and concern for HIT  · Continue statin / Plavix  · PICC line in place  · Vascular intervention: Currently scheduled for L AKA 11/23/22 with Dr Moises Lopez    UTI (urinary tract infection)  Assessment & Plan  UA positive, but likely contaminant rather than an actual urinary tract infection  · Appreciate ID recommendations  Monitor off antibiotics  Thrombocytopenia ([address] )  Assessment & Plan  Possibly due to HIT   Resolved  · Awaiting heparin antibody by serotonin release (performed 11/11)  · Heparin induced antibody elevated    Recent Labs     11/21/22  0520 11/22/22  0542   * 421*         Diabetic ulcer of heel associated with diabetes mellitus due to underlying condition [facility])  Assessment & Plan  Vascular surgery planning intervention teodora    Acute on chronic anemia  Assessment & Plan  S/p 3U pRBC  Will order another unit today in anticipation for planned intervention teodora  Discussed with anesthesia  Recent Labs     11/21/22  0520 11/22/22  0542   HGB 6 8* 7 9*         Hypokalemia  Assessment & Plan  Likely due to GI losses  Resolved  · Continue repleting PRN    Recent Labs     11/21/22  0520 11/22/22  0542   K 3 8 3 5   MG 1 8 1 7         Type 2 diabetes mellitus with hyperglycemia, with long-term current use of insulin [facility])  Assessment & Plan  Lab Results   Component Value Date    HGBA1C 9 8 (H) 10/25/2022     Recent Labs     11/21/22  2056 11/22/22  0735 11/22/22  1142 11/22/22  1538   POCGLU 153* 141* 167* 134     · Inpatient regimen: Sliding scale, Glargine 15U HS    Benign essential hypertension  Assessment & Plan  Above goal  · Continue amlodipine, Cardura    Depression, recurrent (HCC)  Assessment & Plan  Evaluated by Psychiatry on this admission  · Continue Zoloft 150 mg daily / Quetiapine 25mg po Qhs    Acute respiratory failure with hypoxia ([address] )  Assessment & Plan  Echo on 11/07 shows 43% LVEF, no diastolic dysfunction  Postprocedural hypoxia  · Currently on 1 5L NC      VTE Pharmacologic Prophylaxis:   Pharmacologic: Argatroban  Mechanical VTE Prophylaxis in Place: Yes    Discussions with Specialists or Other Care Team Provider: nursing, anesthesia    Education and Discussions with Family / Patient: patient    Time Spent for Care: 30 minutes  More than 50% of total time spent on counseling and coordination of care as described above      Current Length of Stay: 31 day(s)    Current Patient Status: Inpatient   Certification Statement: The patient will continue to require additional inpatient hospital stay due to pRBC, AKA    Discharge Plan: active    Code Status: Level 1 - Full Code      Subjective:   Patient seen and examined at bedside  Comfortable  She is in agreement with pRBC transfusion today for her planned intervention teodora  No new complaints teodora  Objective:     Vitals:   Temp (24hrs), Av 3 °F (36 8 °C), Min:97 9 °F (36 6 °C), Max:98 8 °F (37 1 °C)    Temp:  [97 9 °F (36 6 °C)-98 8 °F (37 1 °C)] 98 8 °F (37 1 °C)  HR:  [84-92] 84  Resp:  [18] 18  BP: (148-177)/(69-89) 177/89  SpO2:  [90 %-92 %] 91 %  Body mass index is 38 3 kg/m²  Input and Output Summary (last 24 hours): Intake/Output Summary (Last 24 hours) at 2022 1708  Last data filed at 2022 9407  Gross per 24 hour   Intake --   Output 1575 ml   Net -1575 ml       Physical Exam:     Physical Exam  Vitals reviewed  Constitutional:       General: She is not in acute distress  HENT:      Head: Normocephalic  Nose: Nose normal       Mouth/Throat:      Mouth: Mucous membranes are moist    Cardiovascular:      Rate and Rhythm: Normal rate  Pulmonary:      Effort: Pulmonary effort is normal  No respiratory distress  Abdominal:      Palpations: Abdomen is soft  Tenderness: There is no abdominal tenderness  Musculoskeletal:      Comments: BLLe wounds wrapped    Skin:     General: Skin is warm  Neurological:      Mental Status: She is alert  Mental status is at baseline     Psychiatric:         Mood and Affect: Mood normal          Behavior: Behavior normal        Additional Data:     Labs:    Results from last 7 days   Lab Units 22  0542   WBC Thousand/uL 14 11*   HEMOGLOBIN g/dL 7 9*   HEMATOCRIT % 26 3*   PLATELETS Thousands/uL 421*   NEUTROS PCT % 82*   LYMPHS PCT % 9*   MONOS PCT % 6   EOS PCT % 2     Results from last 7 days   Lab Units 22  0542   SODIUM mmol/L 142   POTASSIUM mmol/L 3 5   CHLORIDE mmol/L 105   CO2 mmol/L 30   BUN mg/dL 7   CREATININE mg/dL 0 79   ANION GAP mmol/L 7   CALCIUM mg/dL 7 7*   GLUCOSE RANDOM mg/dL 134         Results from last 7 days   Lab Units 22  1538 22  1142 22  0735 11/21/22  2056 11/21/22  1600 11/21/22  1059 11/21/22  0710 11/20/22  2158 11/20/22  1546 11/20/22  1118 11/20/22  0712 11/19/22  2344   POC GLUCOSE mg/dl 134 167* 141* 153* 156* 211* 152* 188* 150* 135 160* 143*                   * I Have Reviewed All Lab Data Listed Above  * Additional Pertinent Lab Tests Reviewed:  [address] Admission Reviewed      Lines:  Invasive Devices     Peripherally Inserted Central Catheter Line  Duration           PICC Line 87/32/15 Right Basilic 4 days          Drain  Duration           Urethral Catheter Non-latex 18 Fr  5 days               Imaging:    Imaging Reports Reviewed Today Include: no new imaging    Recent Cultures (last 7 days):           Last 24 Hours Medication List:   Current Facility-Administered Medications   Medication Dose Route Frequency Provider Last Rate   • acetaminophen  650 mg Oral Q6H [address] Maxim Ricardo PA-C     • ALPRAZolam  0 25 mg Oral Daily PRN Melissa Marroquin MD     • amLODIPine  10 mg Oral Daily Shae Galaviz MD     • argatroban  0 1-3 mcg/kg/min Intravenous Titrated Melissa Marroquin MD 0 1 mcg/kg/min (11/20/22 1822)   • atorvastatin  40 mg Oral Daily With Andrew Rosales MD     • [START ON 11/23/2022] cefazolin  2,000 mg Intravenous Once Silvano Thompson DO     • chlorhexidine  15 mL Swish & Spit Once Maxim Ricardo PA-C     • clopidogrel  75 mg Oral Daily Sherron Meza MD     • doxazosin  2 mg Oral Daily Sherron Meza MD     • gabapentin  200 mg Oral BID Sherron Meza MD     • heparin 2000 units and papaverine 60 mg in isolyte equivalent 500 mL irrigation bag   Irrigation Once Radha Vaz MD     • HYDROmorphone  0 5 mg Intravenous Q3H PRN Maxim Ricardo PA-C     • insulin glargine  15 Units Subcutaneous HS Sherron Meza MD     • insulin lispro  1-6 Units Subcutaneous TID Sherron Meza MD     • naloxone  0 04 mg Intravenous Q1MIN PRN Beth Krishna MD     • nystatin   Topical BID Beth Krishna MD     • ondansetron  4 mg Intravenous Q6H PRN Beth Krishna MD     • oxyCODONE  5 mg Oral Q4H PRN Tamela Francois MD     • pantoprazole  40 mg Oral Early Morning Beth Krishna MD     • polyethylene glycol  17 g Oral Daily PRN Beth Krishna MD     • QUEtiapine  25 mg Oral HS Beth Krishna MD     • senna  1 tablet Oral HS PRN ALEX Mejia     • sertraline  150 mg Oral Daily Beth Krishna MD          Today, Patient Was Seen By: Jese Christie MD    ** Please Note: Dictation voice to text software may have been used in the creation of this document   **

## 2022-11-22 NOTE — PLAN OF CARE
Problem: Potential for Falls  Goal: Patient will remain free of falls  Description: INTERVENTIONS:  - Educate patient/family on patient safety including physical limitations  - Instruct patient to call for assistance with activity   - Consult OT/PT to assist with strengthening/mobility   - Keep Call bell within reach  - Keep bed low and locked with side rails adjusted as appropriate  - Keep care items and personal belongings within reach  - Initiate and maintain comfort rounds  - Make Fall Risk Sign visible to staff  - Offer Toileting every 2 Hours, in advance of need  - Initiate/Maintain bed  chair alarm  - Obtain necessary fall risk management equipment: bed chair alarm, call bell, gripper sock, walker, bedside commode  - Apply yellow socks and bracelet for high fall risk patients  - Consider moving patient to room near nurses station  11/22/2022 0806 by Beba Vaz RN  Outcome: Progressing  11/22/2022 0801 by Beba Vaz RN  Outcome: Progressing     Problem: MOBILITY - ADULT  Goal: Maintain or return to baseline ADL function  Description: INTERVENTIONS:  -  Assess patient's ability to carry out ADLs; assess patient's baseline for ADL function and identify physical deficits which impact ability to perform ADLs (bathing, care of mouth/teeth, toileting, grooming, dressing, etc )  - Assess/evaluate cause of self-care deficits   - Assess range of motion  - Assess patient's mobility; develop plan if impaired  - Assess patient's need for assistive devices and provide as appropriate  - Encourage maximum independence but intervene and supervise when necessary  - Involve family in performance of ADLs  - Assess for home care needs following discharge   - Consider OT consult to assist with ADL evaluation and planning for discharge  - Provide patient education as appropriate  11/22/2022 0806 by Beba aVz RN  Outcome: Progressing  11/22/2022 0801 by Beba Vaz RN  Outcome: Progressing  Goal: Maintains/Returns to pre admission functional level  Description: INTERVENTIONS:  - Perform BMAT or MOVE assessment daily    - Set and communicate daily mobility goal to care team and patient/family/caregiver  - Collaborate with rehabilitation services on mobility goals if consulted  - Assist patient in repositioning every 2 hours    - Dangle patient 3 times a day  - Stand patient 3 times a day  - Out of bed to chair as tolerated  - Out of bed for meals  - Out of bed for toileting  - Record patient progress and toleration of activity level   11/22/2022 0806 by Neila Landau, RN  Outcome: Progressing  11/22/2022 0801 by Neila Landau, RN  Outcome: Progressing     Problem: PAIN - ADULT  Goal: Verbalizes/displays adequate comfort level or baseline comfort level  Description: Interventions:  - Encourage patient to monitor pain and request assistance  - Assess pain using appropriate pain scale  - Administer analgesics based on type and severity of pain and evaluate response  - Implement non-pharmacological measures as appropriate and evaluate response  - Consider cultural and social influences on pain and pain management  - Notify physician/advanced practitioner if interventions unsuccessful or patient reports new pain  11/22/2022 0806 by Neila Landau, RN  Outcome: Progressing  11/22/2022 0801 by Neila Landau, RN  Outcome: Progressing     Problem: INFECTION - ADULT  Goal: Absence or prevention of progression during hospitalization  Description: INTERVENTIONS:  - Assess and monitor for signs and symptoms of infection  - Monitor lab/diagnostic results  - Monitor all insertion sites, i e  indwelling lines, tubes, and drains  - Administer medications as ordered  - Instruct and encourage patient and family to use good hand hygiene technique  11/22/2022 0806 by Neila Landau, RN  Outcome: Progressing  11/22/2022 0801 by Neila Landau, RN  Outcome: Progressing     Problem: SAFETY ADULT  Goal: Patient will remain free of falls  Description: INTERVENTIONS:  - Educate patient/family on patient safety including physical limitations  - Instruct patient to call for assistance with activity   - Consult OT/PT to assist with strengthening/mobility   - Keep Call bell within reach  - Keep bed low and locked with side rails adjusted as appropriate  - Keep care items and personal belongings within reach  - Initiate and maintain comfort rounds  - Make Fall Risk Sign visible to staff  - Offer Toileting every 2 Hours, in advance of need  - Initiate/Maintain bed  chair alarm  - Obtain necessary fall risk management equipment: bed chair alarm, call bell, gripper sock, walker, bedside commode  - Apply yellow socks and bracelet for high fall risk patients  - Consider moving patient to room near nurses station  11/22/2022 0806 by Sarkis Gayle RN  Outcome: Progressing  11/22/2022 0801 by Sarkis Gayle RN  Outcome: Progressing  Goal: Maintain or return to baseline ADL function  Description: INTERVENTIONS:  -  Assess patient's ability to carry out ADLs; assess patient's baseline for ADL function and identify physical deficits which impact ability to perform ADLs (bathing, care of mouth/teeth, toileting, grooming, dressing, etc )  - Assess/evaluate cause of self-care deficits   - Assess range of motion  - Assess patient's mobility; develop plan if impaired  - Assess patient's need for assistive devices and provide as appropriate  - Encourage maximum independence but intervene and supervise when necessary  - Involve family in performance of ADLs  - Assess for home care needs following discharge   - Consider OT consult to assist with ADL evaluation and planning for discharge  - Provide patient education as appropriate  11/22/2022 0806 by Sarkis Gayle RN  Outcome: Progressing  11/22/2022 0801 by Sarkis Gayle RN  Outcome: Progressing  Goal: Maintains/Returns to pre admission functional level  Description: INTERVENTIONS:  - Perform BMAT or MOVE assessment daily    - Set and communicate daily mobility goal to care team and patient/family/caregiver  - Collaborate with rehabilitation services on mobility goals if consulted  - Assist patient in repositioning every 2 hours  - Dangle patient 3 times a day  - Stand patient 3 times a day  - Out of bed to chair as tolerated  - Out of bed for meals  - Out of bed for toileting  - Record patient progress and toleration of activity level   11/22/2022 0806 by Dali Monteiro RN  Outcome: Progressing  11/22/2022 0801 by Dali Monteiro RN  Outcome: Progressing     Problem: DISCHARGE PLANNING  Goal: Discharge to home or other facility with appropriate resources  Description: INTERVENTIONS:  - Identify barriers to discharge w/patient   - Arrange for needed discharge resources and transportation as appropriate  - Identify discharge learning needs  - Refer to Case Management Department for coordinating discharge planning if the patient needs post-hospital services based on physician/advanced practitioner order   11/22/2022 0806 by Dali Monteiro RN  Outcome: Progressing  11/22/2022 0801 by Dali Monteiro RN  Outcome: Progressing     Problem: Knowledge Deficit  Goal: Patient/family/caregiver demonstrates understanding of disease process, treatment plan, medications, and discharge instructions  Description: Complete learning assessment and assess knowledge base    Interventions:  - Provide teaching at level of understanding  - Provide teaching via preferred learning methods  11/22/2022 0806 by Dali Monteiro RN  Outcome: Progressing  11/22/2022 0801 by Dali Monteiro RN  Outcome: Progressing     Problem: METABOLIC, FLUID AND ELECTROLYTES - ADULT  Goal: Electrolytes maintained within normal limits  Description: INTERVENTIONS:  - Monitor labs and assess patient for signs and symptoms of electrolyte imbalances  - Administer electrolyte replacement as ordered  - Monitor response to electrolyte replacements, including repeat lab results as appropriate  - Instruct patient on fluid and nutrition as appropriate  11/22/2022 0806 by Keri Murillo RN  Outcome: Progressing  11/22/2022 0801 by Keri Murillo RN  Outcome: Progressing  Goal: Fluid balance maintained  Description: INTERVENTIONS:  - Monitor labs   - Monitor I/O and WT  - Instruct patient on fluid and nutrition as appropriate  - Assess for signs & symptoms of volume excess or deficit  11/22/2022 0806 by Keri Murillo RN  Outcome: Progressing  11/22/2022 0801 by Keri Murillo RN  Outcome: Progressing  Goal: Glucose maintained within target range  Description: INTERVENTIONS:  - Monitor Blood Glucose as ordered  - Assess for signs and symptoms of hyperglycemia and hypoglycemia  - Administer ordered medications to maintain glucose within target range  - Assess nutritional intake and initiate nutrition service referral as needed  11/22/2022 0806 by Keri Murillo RN  Outcome: Progressing  11/22/2022 0801 by Keri Murillo RN  Outcome: Progressing     Problem: SKIN/TISSUE INTEGRITY - ADULT  Goal: Skin Integrity remains intact(Skin Breakdown Prevention)  Description: Assess:  -Perform Nicanor assessment every shift  -Clean and moisturize skin every shift  -Inspect skin when repositioning, toileting, and assisting with ADLS  -Assess under medical devices   -Assess extremities for adequate circulation and sensation     Bed Management:  -Have minimal linens on bed & keep smooth, unwrinkled  -Change linens as needed when moist or perspiring  -Avoid sitting or lying in one position for more than 2 hours while in bed    Toileting:  -Offer bedside commode  -Assess for incontinence every 2 hours  -Use incontinent care products after each incontinent episode     Activity:  -Mobilize patient 3 times a day  -Encourage or provide ROM exercises   -Turn and reposition patient every 2 Hours  -Use appropriate equipment to lift or move patient in bed  -Instruct/ Assist with weight shifting every 15 minutes when out of bed in chair  -Consider limitation of chair time 1 hour intervals    Skin Care:  -Avoid use of baby powder, tape, friction and shearing, hot water or constrictive clothing  -Relieve pressure over bony prominences using waffle cushion when in chair  -Do not massage red bony areas    11/22/2022 0806 by Beba Vaz RN  Outcome: Progressing  11/22/2022 0801 by Beba Vaz RN  Outcome: Progressing  Goal: Incision(s), wounds(s) or drain site(s) healing without S/S of infection  Description: INTERVENTIONS  - Assess and document dressing, incision, wound bed, drain sites and surrounding tissue  - Provide patient and family education  - Perform skin care/dressing changes everyday as ordered  11/22/2022 0806 by Beba Vaz RN  Outcome: Progressing  11/22/2022 0801 by Beba Vaz RN  Outcome: Progressing  Goal: Pressure injury heals and does not worsen  Description: Interventions:  - Implement low air loss mattress or specialty surface (Criteria met)  - Apply silicone foam dressing  - Instruct/assist with weight shifting every 15 minutes when in chair   - Limit chair time to 1 hour intervals  - Use special pressure reducing interventions such as waffle cushion when in chair   - Perform passive or active ROM   - Turn and reposition patient & offload bony prominences every 2 hours   - Utilize friction reducing device or surface for transfers   - Consider consults to  interdisciplinary teams such as wound care, PT/OT  - Use incontinent care products after each incontinent episode   - Consider nutrition services referral as needed  11/22/2022 0806 by Beba Vaz RN  Outcome: Progressing  11/22/2022 0801 by Beba Vaz RN  Outcome: Progressing     Problem: MUSCULOSKELETAL - ADULT  Goal: Maintain or return mobility to safest level of function  Description: INTERVENTIONS:  - Assess patient's ability to carry out ADLs; assess patient's baseline for ADL function and identify physical deficits which impact ability to perform ADLs (bathing, care of mouth/teeth, toileting, grooming, dressing, etc )  - Assess/evaluate cause of self-care deficits   - Assess range of motion  - Assess patient's mobility  - Assess patient's need for assistive devices and provide as appropriate  - Encourage maximum independence but intervene and supervise when necessary  - Involve family in performance of ADLs  - Assess for home care needs following discharge   - Consider OT consult to assist with ADL evaluation and planning for discharge  - Provide patient education as appropriate  11/22/2022 0806 by Jarett Arechiga RN  Outcome: Progressing  11/22/2022 0801 by Jarett Arechiga RN  Outcome: Progressing  Goal: Maintain proper alignment of affected body part  Description: INTERVENTIONS:  - Support, maintain and protect limb and body alignment  - Provide patient/ family with appropriate education  11/22/2022 0806 by Jarett Arechiga RN  Outcome: Progressing  11/22/2022 0801 by Jarett Arechiga RN  Outcome: Progressing     Problem: CARDIOVASCULAR - ADULT  Goal: Maintains optimal cardiac output and hemodynamic stability  Description: INTERVENTIONS:  - Monitor I/O, vital signs and rhythm  - Monitor for S/S and trends of decreased cardiac output  - Administer and titrate ordered vasoactive medications to optimize hemodynamic stability  - Assess quality of pulses, skin color and temperature  - Assess for signs of decreased coronary artery perfusion  - Instruct patient to report change in severity of symptoms  11/22/2022 0806 by Jarett Arechiga RN  Outcome: Progressing  11/22/2022 0801 by Jarett Arechiga RN  Outcome: Progressing     Problem: RESPIRATORY - ADULT  Goal: Achieves optimal ventilation and oxygenation  Description: INTERVENTIONS:  - Assess for changes in respiratory status  - Assess for changes in mentation and behavior  - Position to facilitate oxygenation and minimize respiratory effort  - Oxygen administered by appropriate delivery if ordered  - Initiate smoking cessation education as indicated  - Encourage broncho-pulmonary hygiene including cough, deep breathe, Incentive Spirometry  - Assess the need for suctioning and aspirate as needed  - Assess and instruct to report SOB or any respiratory difficulty  - Respiratory Therapy support as indicated  11/22/2022 0806 by Laci Steele RN  Outcome: Progressing  11/22/2022 0801 by Laci Steele RN  Outcome: Progressing     Problem: Prexisting or High Potential for Compromised Skin Integrity  Goal: Skin integrity is maintained or improved  Description: INTERVENTIONS:  - Identify patients at risk for skin breakdown  - Assess and monitor skin integrity  - Assess and monitor nutrition and hydration status  - Monitor labs   - Assess for incontinence   - Turn and reposition patient  - Assist with mobility/ambulation  - Relieve pressure over bony prominences  - Avoid friction and shearing  - Provide appropriate hygiene as needed including keeping skin clean and dry  - Evaluate need for skin moisturizer/barrier cream  - Collaborate with interdisciplinary team   - Patient/family teaching  - Consider wound care consult   11/22/2022 0806 by Laci Steele RN  Outcome: Progressing  11/22/2022 0801 by Laci Steele RN  Outcome: Progressing     Problem: Nutrition/Hydration-ADULT  Goal: Nutrient/Hydration intake appropriate for improving, restoring or maintaining nutritional needs  Description: Monitor and assess patient's nutrition/hydration status for malnutrition  Collaborate with interdisciplinary team and initiate plan and interventions as ordered  Monitor patient's weight and dietary intake as ordered or per policy  Utilize nutrition screening tool and intervene as necessary   Determine patient's food preferences and provide high-protein, high-caloric foods as appropriate       INTERVENTIONS:  - Monitor oral intake, urinary output, labs, and treatment plans  - Assess nutrition and hydration status and recommend course of action  - Evaluate amount of meals eaten  - Assist patient with eating if necessary   - Allow adequate time for meals  - Recommend/ encourage appropriate diets, oral nutritional supplements, and vitamin/mineral supplements  - Order, calculate, and assess calorie counts as needed  - Recommend, monitor, and adjust tube feedings and TPN/PPN based on assessed needs  - Assess need for intravenous fluids  - Provide specific nutrition/hydration education as appropriate  - Include patient/family/caregiver in decisions related to nutrition  11/22/2022 0806 by Wesly Lira, RN  Outcome: Progressing  11/22/2022 0801 by Wesly Lira, RN  Outcome: Progressing

## 2022-11-22 NOTE — ASSESSMENT & PLAN NOTE
65year old female is currently admitted due to a moderate to large volume left MCA territory stroke  · Repeat CT head showed an evolving acute infarct in right parietal-temporal-occipital lobes with questionable petechial cortical hemorrhage in right parietal lobe  Discussed with Dr Glenis Li  Recommended that we continue anticoagulation for now due to the clinical insignificance of the size and the risk of taking her off anticoagulation far outweighs the benefits off of it  · Continue argatroban until surgical intervention has been completed

## 2022-11-22 NOTE — ASSESSMENT & PLAN NOTE
Likely due to GI losses   Resolved  · Continue repleting PRN    Recent Labs     11/21/22  0520 11/22/22  0542   K 3 8 3 5   MG 1 8 1 7

## 2022-11-22 NOTE — ASSESSMENT & PLAN NOTE
Lab Results   Component Value Date    HGBA1C 9 8 (H) 10/25/2022     Recent Labs     11/21/22 2056 11/22/22  0735 11/22/22  1142 11/22/22  1538   POCGLU 153* 141* 167* 134     · Inpatient regimen: Sliding scale, Glargine 15U HS

## 2022-11-22 NOTE — ANESTHESIA PREPROCEDURE EVALUATION
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 (HCC)   (+) Uncontrolled type 2 diabetes with neuropathy      GI/HEPATIC   (+) Esophageal reflux      /RENAL (within normal limits)      GYN (within normal limits)      HEMATOLOGY   (+) Acute on chronic anemia   (+) Thrombocytopenia (HCC)      MUSCULOSKELETAL   (+) Arthritis      NEURO/PSYCH   (+) Anxiety   (+) CVA (cerebral vascular accident) ([facility] [address] )   (+) Depression, recurrent (HCC)   (+) Diabetic peripheral neuropathy (HCC)   (+) Stroke (HCC)      PULMONARY   (+) Acute respiratory failure with hypoxia (HCC)        Physical Exam    Airway    Mallampati score: III  TM Distance: >3 FB  Neck ROM: full     Dental       Cardiovascular  Rhythm: regular, Rate: normal, Cardiovascular exam normal    Pulmonary  Pulmonary exam normal Breath sounds clear to auscultation,     Other Findings        Anesthesia Plan  ASA Score- 3     Anesthesia Type- general with ASA Monitors  Additional Monitors:   Airway Plan:           Plan Factors-Exercise tolerance (METS): <4 METS  Chart reviewed  EKG reviewed  Existing labs reviewed  Patient is not a current smoker  Obstructive sleep apnea risk education given perioperatively  Induction- intravenous  Postoperative Plan-     Informed Consent- Anesthetic plan and risks discussed with patient

## 2022-11-22 NOTE — ASSESSMENT & PLAN NOTE
Possibly due to HIT   Resolved  · Awaiting heparin antibody by serotonin release (performed 11/11)  · Heparin induced antibody elevated    Recent Labs     11/21/22  0520 11/22/22  0542   * 421*

## 2022-11-22 NOTE — ASSESSMENT & PLAN NOTE
S/p 3U pRBC  Will order another unit today in anticipation for planned intervention teodora  Discussed with anesthesia      Recent Labs     11/21/22  0520 11/22/22  0542   HGB 6 8* 7 9*

## 2022-11-23 ENCOUNTER — ANESTHESIA (INPATIENT)
Dept: PERIOP | Facility: HOSPITAL | Age: 62
End: 2022-11-23

## 2022-11-23 LAB
ABO GROUP BLD BPU: NORMAL
ANION GAP SERPL CALCULATED.3IONS-SCNC: 9 MMOL/L (ref 4–13)
APTT PPP: 55 SECONDS (ref 23–37)
APTT PPP: 56 SECONDS (ref 23–37)
APTT PPP: 68 SECONDS (ref 23–37)
BASOPHILS # BLD AUTO: 0.05 THOUSANDS/ÂΜL (ref 0–0.1)
BASOPHILS NFR BLD AUTO: 0 % (ref 0–1)
BPU ID: NORMAL
BUN SERPL-MCNC: 7 MG/DL (ref 5–25)
CALCIUM SERPL-MCNC: 7.8 MG/DL (ref 8.3–10.1)
CHLORIDE SERPL-SCNC: 105 MMOL/L (ref 96–108)
CO2 SERPL-SCNC: 29 MMOL/L (ref 21–32)
CREAT SERPL-MCNC: 0.8 MG/DL (ref 0.6–1.3)
CROSSMATCH: NORMAL
EOSINOPHIL # BLD AUTO: 0.26 THOUSAND/ÂΜL (ref 0–0.61)
EOSINOPHIL NFR BLD AUTO: 2 % (ref 0–6)
ERYTHROCYTE [DISTWIDTH] IN BLOOD BY AUTOMATED COUNT: 15.3 % (ref 11.6–15.1)
GFR SERPL CREATININE-BSD FRML MDRD: 79 ML/MIN/1.73SQ M
GLUCOSE SERPL-MCNC: 103 MG/DL (ref 65–140)
GLUCOSE SERPL-MCNC: 107 MG/DL (ref 65–140)
GLUCOSE SERPL-MCNC: 119 MG/DL (ref 65–140)
GLUCOSE SERPL-MCNC: 121 MG/DL (ref 65–140)
GLUCOSE SERPL-MCNC: 95 MG/DL (ref 65–140)
HCT VFR BLD AUTO: 27.7 % (ref 34.8–46.1)
HGB BLD-MCNC: 8.7 G/DL (ref 11.5–15.4)
IMM GRANULOCYTES # BLD AUTO: 0.14 THOUSAND/UL (ref 0–0.2)
IMM GRANULOCYTES NFR BLD AUTO: 1 % (ref 0–2)
LYMPHOCYTES # BLD AUTO: 1.17 THOUSANDS/ÂΜL (ref 0.6–4.47)
LYMPHOCYTES NFR BLD AUTO: 9 % (ref 14–44)
MAGNESIUM SERPL-MCNC: 1.6 MG/DL (ref 1.6–2.6)
MCH RBC QN AUTO: 27.3 PG (ref 26.8–34.3)
MCHC RBC AUTO-ENTMCNC: 31.4 G/DL (ref 31.4–37.4)
MCV RBC AUTO: 87 FL (ref 82–98)
MONOCYTES # BLD AUTO: 0.82 THOUSAND/ÂΜL (ref 0.17–1.22)
MONOCYTES NFR BLD AUTO: 6 % (ref 4–12)
NEUTROPHILS # BLD AUTO: 10.65 THOUSANDS/ÂΜL (ref 1.85–7.62)
NEUTS SEG NFR BLD AUTO: 82 % (ref 43–75)
NRBC BLD AUTO-RTO: 0 /100 WBCS
PLATELET # BLD AUTO: 418 THOUSANDS/UL (ref 149–390)
PMV BLD AUTO: 8.3 FL (ref 8.9–12.7)
POTASSIUM SERPL-SCNC: 3.3 MMOL/L (ref 3.5–5.3)
RBC # BLD AUTO: 3.19 MILLION/UL (ref 3.81–5.12)
SODIUM SERPL-SCNC: 143 MMOL/L (ref 135–147)
UNIT DISPENSE STATUS: NORMAL
UNIT PRODUCT CODE: NORMAL
UNIT PRODUCT VOLUME: 350 ML
UNIT RH: NORMAL
WBC # BLD AUTO: 13.09 THOUSAND/UL (ref 4.31–10.16)

## 2022-11-23 PROCEDURE — 82948 REAGENT STRIP/BLOOD GLUCOSE: CPT

## 2022-11-23 PROCEDURE — 99232 SBSQ HOSP IP/OBS MODERATE 35: CPT | Performed by: STUDENT IN AN ORGANIZED HEALTH CARE EDUCATION/TRAINING PROGRAM

## 2022-11-23 PROCEDURE — 80048 BASIC METABOLIC PNL TOTAL CA: CPT | Performed by: STUDENT IN AN ORGANIZED HEALTH CARE EDUCATION/TRAINING PROGRAM

## 2022-11-23 PROCEDURE — 88307 TISSUE EXAM BY PATHOLOGIST: CPT | Performed by: PATHOLOGY

## 2022-11-23 PROCEDURE — 85025 COMPLETE CBC W/AUTO DIFF WBC: CPT | Performed by: STUDENT IN AN ORGANIZED HEALTH CARE EDUCATION/TRAINING PROGRAM

## 2022-11-23 PROCEDURE — 85730 THROMBOPLASTIN TIME PARTIAL: CPT | Performed by: STUDENT IN AN ORGANIZED HEALTH CARE EDUCATION/TRAINING PROGRAM

## 2022-11-23 PROCEDURE — 0Y6D0Z3 DETACHMENT AT LEFT UPPER LEG, LOW, OPEN APPROACH: ICD-10-PCS | Performed by: SURGERY

## 2022-11-23 PROCEDURE — 88311 DECALCIFY TISSUE: CPT | Performed by: PATHOLOGY

## 2022-11-23 PROCEDURE — 99024 POSTOP FOLLOW-UP VISIT: CPT | Performed by: SURGERY

## 2022-11-23 PROCEDURE — 27590 AMPUTATE LEG AT THIGH: CPT | Performed by: SURGERY

## 2022-11-23 PROCEDURE — 83735 ASSAY OF MAGNESIUM: CPT | Performed by: STUDENT IN AN ORGANIZED HEALTH CARE EDUCATION/TRAINING PROGRAM

## 2022-11-23 RX ORDER — ONDANSETRON 2 MG/ML
4 INJECTION INTRAMUSCULAR; INTRAVENOUS ONCE AS NEEDED
Status: DISCONTINUED | OUTPATIENT
Start: 2022-11-23 | End: 2022-11-23 | Stop reason: HOSPADM

## 2022-11-23 RX ORDER — HYDROMORPHONE HCL/PF 1 MG/ML
0.5 SYRINGE (ML) INJECTION
Status: DISCONTINUED | OUTPATIENT
Start: 2022-11-23 | End: 2022-11-23 | Stop reason: HOSPADM

## 2022-11-23 RX ORDER — ROCURONIUM BROMIDE 10 MG/ML
INJECTION, SOLUTION INTRAVENOUS AS NEEDED
Status: DISCONTINUED | OUTPATIENT
Start: 2022-11-23 | End: 2022-11-23

## 2022-11-23 RX ORDER — SODIUM CHLORIDE, SODIUM LACTATE, POTASSIUM CHLORIDE, CALCIUM CHLORIDE 600; 310; 30; 20 MG/100ML; MG/100ML; MG/100ML; MG/100ML
125 INJECTION, SOLUTION INTRAVENOUS CONTINUOUS
Status: DISCONTINUED | OUTPATIENT
Start: 2022-11-23 | End: 2022-11-23

## 2022-11-23 RX ORDER — ONDANSETRON 2 MG/ML
4 INJECTION INTRAMUSCULAR; INTRAVENOUS ONCE
Status: CANCELLED | OUTPATIENT
Start: 2022-11-23 | End: 2022-11-23

## 2022-11-23 RX ORDER — PROPOFOL 10 MG/ML
INJECTION, EMULSION INTRAVENOUS AS NEEDED
Status: DISCONTINUED | OUTPATIENT
Start: 2022-11-23 | End: 2022-11-23

## 2022-11-23 RX ORDER — FENTANYL CITRATE/PF 50 MCG/ML
25 SYRINGE (ML) INJECTION
Status: COMPLETED | OUTPATIENT
Start: 2022-11-23 | End: 2022-11-23

## 2022-11-23 RX ORDER — ROPIVACAINE HYDROCHLORIDE 5 MG/ML
INJECTION, SOLUTION EPIDURAL; INFILTRATION; PERINEURAL
Status: COMPLETED | OUTPATIENT
Start: 2022-11-23 | End: 2022-11-23

## 2022-11-23 RX ORDER — METOCLOPRAMIDE HYDROCHLORIDE 5 MG/ML
10 INJECTION INTRAMUSCULAR; INTRAVENOUS EVERY 6 HOURS PRN
Status: DISCONTINUED | OUTPATIENT
Start: 2022-11-23 | End: 2023-01-12 | Stop reason: HOSPADM

## 2022-11-23 RX ORDER — MEPERIDINE HYDROCHLORIDE 25 MG/ML
12.5 INJECTION INTRAMUSCULAR; INTRAVENOUS; SUBCUTANEOUS
Status: DISCONTINUED | OUTPATIENT
Start: 2022-11-23 | End: 2022-11-23 | Stop reason: HOSPADM

## 2022-11-23 RX ORDER — FENTANYL CITRATE 50 UG/ML
INJECTION, SOLUTION INTRAMUSCULAR; INTRAVENOUS
Status: COMPLETED | OUTPATIENT
Start: 2022-11-23 | End: 2022-11-23

## 2022-11-23 RX ORDER — MAGNESIUM HYDROXIDE 1200 MG/15ML
LIQUID ORAL AS NEEDED
Status: DISCONTINUED | OUTPATIENT
Start: 2022-11-23 | End: 2022-11-23 | Stop reason: HOSPADM

## 2022-11-23 RX ORDER — MIDAZOLAM HYDROCHLORIDE 2 MG/2ML
INJECTION, SOLUTION INTRAMUSCULAR; INTRAVENOUS
Status: COMPLETED | OUTPATIENT
Start: 2022-11-23 | End: 2022-11-23

## 2022-11-23 RX ORDER — SODIUM CHLORIDE 9 MG/ML
20 INJECTION, SOLUTION INTRAVENOUS CONTINUOUS
Status: DISCONTINUED | OUTPATIENT
Start: 2022-11-23 | End: 2022-12-04

## 2022-11-23 RX ORDER — ONDANSETRON 2 MG/ML
INJECTION INTRAMUSCULAR; INTRAVENOUS AS NEEDED
Status: DISCONTINUED | OUTPATIENT
Start: 2022-11-23 | End: 2022-11-23

## 2022-11-23 RX ORDER — ARGATROBAN 1 MG/ML
.1-3 INJECTION INTRAVENOUS
Status: DISCONTINUED | OUTPATIENT
Start: 2022-11-23 | End: 2022-12-01

## 2022-11-23 RX ORDER — LIDOCAINE HYDROCHLORIDE 20 MG/ML
INJECTION, SOLUTION EPIDURAL; INFILTRATION; INTRACAUDAL; PERINEURAL AS NEEDED
Status: DISCONTINUED | OUTPATIENT
Start: 2022-11-23 | End: 2022-11-23

## 2022-11-23 RX ADMIN — CHLORHEXIDINE GLUCONATE 0.12% ORAL RINSE 15 ML: 1.2 LIQUID ORAL at 06:23

## 2022-11-23 RX ADMIN — SODIUM CHLORIDE 20 ML/HR: 0.9 INJECTION, SOLUTION INTRAVENOUS at 22:49

## 2022-11-23 RX ADMIN — GABAPENTIN 200 MG: 100 CAPSULE ORAL at 17:43

## 2022-11-23 RX ADMIN — FENTANYL CITRATE 100 MCG: 50 INJECTION INTRAMUSCULAR; INTRAVENOUS at 07:35

## 2022-11-23 RX ADMIN — ROCURONIUM BROMIDE 10 MG: 50 INJECTION, SOLUTION INTRAVENOUS at 09:06

## 2022-11-23 RX ADMIN — OXYCODONE 5 MG: 5 TABLET ORAL at 21:14

## 2022-11-23 RX ADMIN — ROCURONIUM BROMIDE 20 MG: 50 INJECTION, SOLUTION INTRAVENOUS at 08:30

## 2022-11-23 RX ADMIN — LIDOCAINE HYDROCHLORIDE 80 MG: 20 INJECTION, SOLUTION EPIDURAL; INFILTRATION; INTRACAUDAL at 08:01

## 2022-11-23 RX ADMIN — ATORVASTATIN CALCIUM 40 MG: 40 TABLET, FILM COATED ORAL at 17:43

## 2022-11-23 RX ADMIN — ONDANSETRON 4 MG: 2 INJECTION INTRAMUSCULAR; INTRAVENOUS at 18:35

## 2022-11-23 RX ADMIN — ARGATROBAN 0.2 MCG/KG/MIN: 50 INJECTION, SOLUTION INTRAVENOUS at 06:20

## 2022-11-23 RX ADMIN — FENTANYL CITRATE 25 MCG: 50 INJECTION INTRAMUSCULAR; INTRAVENOUS at 10:09

## 2022-11-23 RX ADMIN — PROPOFOL 160 MG: 10 INJECTION, EMULSION INTRAVENOUS at 08:13

## 2022-11-23 RX ADMIN — FENTANYL CITRATE 25 MCG: 50 INJECTION INTRAMUSCULAR; INTRAVENOUS at 10:24

## 2022-11-23 RX ADMIN — QUETIAPINE FUMARATE 25 MG: 25 TABLET ORAL at 21:14

## 2022-11-23 RX ADMIN — ACETAMINOPHEN 650 MG: 325 TABLET, FILM COATED ORAL at 11:25

## 2022-11-23 RX ADMIN — CEFAZOLIN SODIUM 2000 MG: 2 SOLUTION INTRAVENOUS at 07:48

## 2022-11-23 RX ADMIN — FENTANYL CITRATE 50 MCG: 50 INJECTION INTRAMUSCULAR; INTRAVENOUS at 09:40

## 2022-11-23 RX ADMIN — SODIUM CHLORIDE, SODIUM LACTATE, POTASSIUM CHLORIDE, AND CALCIUM CHLORIDE 125 ML/HR: .6; .31; .03; .02 INJECTION, SOLUTION INTRAVENOUS at 06:17

## 2022-11-23 RX ADMIN — HYDROMORPHONE HYDROCHLORIDE 0.5 MG: 1 INJECTION, SOLUTION INTRAMUSCULAR; INTRAVENOUS; SUBCUTANEOUS at 10:30

## 2022-11-23 RX ADMIN — ONDANSETRON 4 MG: 2 INJECTION INTRAMUSCULAR; INTRAVENOUS at 09:31

## 2022-11-23 RX ADMIN — ARGATROBAN 0.1 MCG/KG/MIN: 50 INJECTION, SOLUTION INTRAVENOUS at 14:02

## 2022-11-23 RX ADMIN — SODIUM CHLORIDE, SODIUM LACTATE, POTASSIUM CHLORIDE, AND CALCIUM CHLORIDE 125 ML/HR: .6; .31; .03; .02 INJECTION, SOLUTION INTRAVENOUS at 10:30

## 2022-11-23 RX ADMIN — ACETAMINOPHEN 650 MG: 325 TABLET, FILM COATED ORAL at 00:20

## 2022-11-23 RX ADMIN — SUGAMMADEX 300 MG: 100 INJECTION, SOLUTION INTRAVENOUS at 09:33

## 2022-11-23 RX ADMIN — ROCURONIUM BROMIDE 10 MG: 50 INJECTION, SOLUTION INTRAVENOUS at 08:14

## 2022-11-23 RX ADMIN — ACETAMINOPHEN 650 MG: 325 TABLET, FILM COATED ORAL at 17:43

## 2022-11-23 RX ADMIN — FENTANYL CITRATE 25 MCG: 50 INJECTION INTRAMUSCULAR; INTRAVENOUS at 10:14

## 2022-11-23 RX ADMIN — FENTANYL CITRATE 25 MCG: 50 INJECTION INTRAMUSCULAR; INTRAVENOUS at 10:19

## 2022-11-23 RX ADMIN — MIDAZOLAM 2 MG: 1 INJECTION INTRAMUSCULAR; INTRAVENOUS at 07:35

## 2022-11-23 RX ADMIN — ROPIVACAINE HYDROCHLORIDE 20 ML: 5 INJECTION EPIDURAL; INFILTRATION; PERINEURAL at 07:38

## 2022-11-23 RX ADMIN — OXYCODONE 5 MG: 5 TABLET ORAL at 14:10

## 2022-11-23 RX ADMIN — FENTANYL CITRATE 50 MCG: 50 INJECTION INTRAMUSCULAR; INTRAVENOUS at 08:30

## 2022-11-23 RX ADMIN — PANTOPRAZOLE SODIUM 40 MG: 40 TABLET, DELAYED RELEASE ORAL at 05:11

## 2022-11-23 RX ADMIN — NYSTATIN: 100000 POWDER TOPICAL at 17:43

## 2022-11-23 RX ADMIN — ACETAMINOPHEN 650 MG: 325 TABLET, FILM COATED ORAL at 05:11

## 2022-11-23 RX ADMIN — ROCURONIUM BROMIDE 40 MG: 50 INJECTION, SOLUTION INTRAVENOUS at 08:13

## 2022-11-23 RX ADMIN — ALPRAZOLAM 0.25 MG: 0.25 TABLET ORAL at 00:20

## 2022-11-23 RX ADMIN — HYDROMORPHONE HYDROCHLORIDE: 10 INJECTION, SOLUTION INTRAMUSCULAR; INTRAVENOUS; SUBCUTANEOUS at 22:45

## 2022-11-23 NOTE — OP NOTE
OPERATIVE REPORT  PATIENT NAME: Tani Tom    :  1960  MRN: [id number]  Pt Location: [facility] OR ROOM 01    SURGERY DATE: 2022    Surgeon(s) and Role:     * Molly Romero DO - Primary     * Jimbo Sherman MD - Assisting    Preop Diagnosis:  PAD (peripheral artery disease) ([facility] [address] ) [I73 9]    Post-Op Diagnosis Codes:     * PAD (peripheral artery disease) ([facility] [address] ) [I73 9]    Procedure(s) (LRB):  (AKA) (Left)    Specimen(s):  ID Type Source Tests Collected by Time Destination   1 : LEFT ABOVE KNEE AMPUTATION Tissue Leg, Left TISSUE EXAM Molly Romero DO 2022 0806        Estimated Blood Loss:   250 mL    Drains:  Urethral Catheter Non-latex 18 Fr   (Active)   Amt returned on insertion(mL) 700 mL 22 1600   Reasons to continue Urinary Catheter  Stage III/IV sacral ulcers or open perineal wounds in incontinent patients;Acute urinary retention/obstruction failing urinary retention protocol 22 0609   Site Assessment Clean;Skin intact 22 0609   Jacinto Care Done 22 2100   Collection Container Standard drainage bag 22 5828   Securement Method Securing device (Describe) 22 0609   Output (mL) 1000 mL 22 0420   Number of days: 7       Anesthesia Type:   General w/ Regional    Operative Indications:  [facility] [I73 9]  This is a 81-year-old female with history of peripheral vascular disease she had left SFA intervention done earlier this admission with extensive hindfoot wound on the left side her hospital course was complicated by right groin access complication subsequent embolization during revascularization of her right lower extremity she subsequently also developed hit and had a stroke she was ambulatory prior to admission and the leg tissue loss is too extensive to salvage the foot on the left side discussion was had with her extensively including with my partner Dr Vince Simmons about BKA versus AKA and ultimately with everything she has been through she elected to proceed with an AKA for the increased healing potential all risks benefits alternative therapies were discussed with her in detail and she consented to proceed    Operative Findings:  Healthy tissue at the level of amputation    Complications:   None    Procedure and Technique:  After informed consent was obtained from the appropriate parties the patient was correctly identified in the holding area brought to the operating room placed in the supine position appropriate lines monitors were placed after satisfactory induction of general endotracheal anesthesia the left lower extremity prepped and draped in normal sterile fashion Jako time-out was performed and the patient received appropriate periprocedural antibiotics we began making a standard fishmouth incision 1 handbreadth superior to the level of the patella using a 10 blade carried dissection to the subcutaneous tissue down to the crural fascia circumferentially which was then opened using electrocautery the anterior compartment muscular was divided using electrocautery the medial compartment was also divided using electrocautery the vascular bundle in the medial compartment was dissected out and doubly suture-ligated with Vicryl suture  The femur was then circumferentially dissected out up to the level of the apex of the incision and then transected using a Gigli saw at this point the remaining posterior compartment musculature was divided using an amputation knife and the specimen was passed off the field and sent for permanent pathology all minor bleeding points were controlled with either cautery or ties the femur had a smooth edge it was copiously irrigated was noted to be hemostatic the tissue appeared healthy at this level was then closed using interrupted 2-0 Vicryl to reapproximate the fascial layer and the skin was reapproximated using skin staples and a Prevena wound VAC was applied for dressing    Patient tolerated the procedure well she was extubated uneventfully in the operating room taken recovery in stable condition all instrument sponge         I was present for the entire procedure    Patient Disposition:  PACU         SIGNATURE: Anna Crow DO  DATE: November 23, 2022  TIME: 9:39 AM

## 2022-11-23 NOTE — PROGRESS NOTES
[address] [facility]  Progress Note - [facility] [facility] 1960, 65 y o  female MRN: [id number]  Unit/Bed#: E4 -01 Encounter: [id number]  Primary Care Provider: ALEX Rowan   Date and time admitted to hospital: 10/21/2022  7:58 PM    * Stroke [facility])  Assessment & Plan  65year old female is currently admitted due to a moderate to large volume left MCA territory stroke  · Repeat CT head showed an evolving acute infarct in right parietal-temporal-occipital lobes with questionable petechial cortical hemorrhage in right parietal lobe  Discussed with Dr Tonya Collet  Recommended that we continue anticoagulation for now due to the clinical insignificance of the size and the risk of taking her off anticoagulation far outweighs the benefits off of it  · Continue argatroban until all surgical interventions are completed    PAD (peripheral artery disease) ([facility] [address] )  Assessment & Plan  Uncontrolled diabetes, hypertension, hyperlipidemia  Lost to follow-up and poorly compliant to diabetic regimen  · S/p abdominal aortogram,  LLE SFA shockwave angioplasty/FATOU 10/31/22   · S/p R femoral endarterectomy 11/7  · S/p aspiration thrombectomy in right common iliac artery and left SFA  · Heparin drip transitioned to Argatroban in the setting of thrombocytopenia and concern for HIT  · Continue statin / Plavix  · PICC line in place  · S/p L AKA today will await next step in management from vascular surgery and the timing of when to switch her to a DOAC    UTI (urinary tract infection)  Assessment & Plan  UA positive, but likely contaminant rather than an actual urinary tract infection  · Appreciate ID recommendations  Monitor off antibiotics  Diabetic ulcer of heel associated with diabetes mellitus due to underlying condition [facility])  Assessment & Plan  S/p left aka today  Await next step in management from vascular surgery  Acute on chronic anemia  Assessment & Plan  S/p 4U pRBC   Will monitor for post-op bleeding    Recent Labs     22  0542 22  0504   HGB 7 9* 8 7*         Hypokalemia  Assessment & Plan  Likely due to GI losses  Resolved  · Continue repleting PRN    Recent Labs     22  0520 22  0542 22  0504   K 3 8 3 5 3 3*   MG 1 8 1 7 1 6         Type 2 diabetes mellitus with hyperglycemia, with long-term current use of insulin [facility])  Assessment & Plan  Lab Results   Component Value Date    HGBA1C 9 8 (H) 10/25/2022     Recent Labs     22  2051 22  0954 22  1104 22  1634   POCGLU 116 119 121 103     · Inpatient regimen: Sliding scale, Glargine 15U HS    Benign essential hypertension  Assessment & Plan  Above goal  · Continue amlodipine, Cardura    Depression, recurrent (HCC)  Assessment & Plan  Evaluated by Psychiatry on this admission  · Continue Zoloft 150 mg daily / Quetiapine 25mg po Qhs    Acute respiratory failure with hypoxia ([facility] [address] )  Assessment & Plan  Echo on  shows 59% LVEF, no diastolic dysfunction  Postprocedural hypoxia  · Currently on 1 5L NC      VTE Pharmacologic Prophylaxis:   Pharmacologic: Argatroban  Mechanical VTE Prophylaxis in Place: Yes    Discussions with Specialists or Other Care Team Provider: nursing    Education and Discussions with Family / Patient: patient    Time Spent for Care: 30 minutes  More than 50% of total time spent on counseling and coordination of care as described above  Current Length of Stay: 32 day(s)    Current Patient Status: Inpatient   Certification Statement: The patient will continue to require additional inpatient hospital stay due to vascular reeval, transition to doac    Discharge Plan: 48 hours    Code Status: Level 1 - Full Code      Subjective:   Patient seen and examined at bedside  Reports appropriate pain control      Objective:     Vitals:   Temp (24hrs), Av 1 °F (36 7 °C), Min:97 5 °F (36 4 °C), Max:99 1 °F (37 3 °C)    Temp:  [97 5 °F (36 4 °C)-99 1 °F (37 3 °C)] 98 1 °F (36 7 °C)  HR:  [71-96] 71  Resp:  [12-20] 16  BP: (135-192)/(60-84) 147/71  SpO2:  [90 %-97 %] 96 %  Body mass index is 37 31 kg/m²  Input and Output Summary (last 24 hours): Intake/Output Summary (Last 24 hours) at 11/23/2022 1712  Last data filed at 11/23/2022 1025  Gross per 24 hour   Intake 1504 9 ml   Output 2950 ml   Net -1445 1 ml       Physical Exam:     Physical Exam  Vitals reviewed  Constitutional:       General: She is not in acute distress  HENT:      Head: Normocephalic  Nose: Nose normal       Mouth/Throat:      Mouth: Mucous membranes are moist    Eyes:      General: No scleral icterus  Cardiovascular:      Rate and Rhythm: Normal rate  Pulmonary:      Effort: Pulmonary effort is normal  No respiratory distress  Abdominal:      General: There is no distension  Palpations: Abdomen is soft  Tenderness: There is no abdominal tenderness  Musculoskeletal:      Comments: Right leg dressing in place, left AKA   Skin:     General: Skin is warm  Neurological:      Mental Status: She is alert and oriented to person, place, and time     Psychiatric:         Mood and Affect: Mood normal          Behavior: Behavior normal        Additional Data:     Labs:    Results from last 7 days   Lab Units 11/23/22  0504   WBC Thousand/uL 13 09*   HEMOGLOBIN g/dL 8 7*   HEMATOCRIT % 27 7*   PLATELETS Thousands/uL 418*   NEUTROS PCT % 82*   LYMPHS PCT % 9*   MONOS PCT % 6   EOS PCT % 2     Results from last 7 days   Lab Units 11/23/22  0504   SODIUM mmol/L 143   POTASSIUM mmol/L 3 3*   CHLORIDE mmol/L 105   CO2 mmol/L 29   BUN mg/dL 7   CREATININE mg/dL 0 80   ANION GAP mmol/L 9   CALCIUM mg/dL 7 8*   GLUCOSE RANDOM mg/dL 107         Results from last 7 days   Lab Units 11/23/22  1634 11/23/22  1104 11/23/22  0954 11/22/22  2051 11/22/22  1538 11/22/22  1142 11/22/22  0735 11/21/22  2056 11/21/22  1600 11/21/22  1059 11/21/22  0710 11/20/22  2158   POC GLUCOSE mg/dl 103 121 119 116 134 167* 141* 153* 156* 211* 152* 188*                   * I Have Reviewed All Lab Data Listed Above  * Additional Pertinent Lab Tests Reviewed:  [address] Admission Reviewed      Lines:  Invasive Devices     Peripherally Inserted Central Catheter Line  Duration           PICC Line 84/10/22 Right Basilic 5 days          Drain  Duration           Urethral Catheter Non-latex 18 Fr  6 days               Imaging:    Imaging Reports Reviewed Today Include: no new imaging    Recent Cultures (last 7 days):           Last 24 Hours Medication List:   Current Facility-Administered Medications   Medication Dose Route Frequency Provider Last Rate   • acetaminophen  650 mg Oral Q6H [facility] Nando Oliveros MD     • ALPRAZolam  0 25 mg Oral Daily PRN Nando Oliveros MD     • amLODIPine  10 mg Oral Daily Nando Oliveros MD     • argatroban  0 1-3 mcg/kg/min Intravenous Titrated Nando Oliveros MD 0 1 mcg/kg/min (11/23/22 1402)   • atorvastatin  40 mg Oral Daily With Poli Meek MD     • clopidogrel  75 mg Oral Daily Nando Oliveros MD     • doxazosin  2 mg Oral Daily Nando Oliveros MD     • gabapentin  200 mg Oral BID Nando Oliveros MD     • HYDROmorphone  0 5 mg Intravenous Q3H PRN Nando Oliveros MD     • insulin glargine  15 Units Subcutaneous HS Nando Oliveros MD     • insulin lispro  1-6 Units Subcutaneous TID AC Nando Oliveros MD     • lactated ringers  500 mL Intravenous Once PRN Nando Oliveros MD      And   • lactated ringers  500 mL Intravenous Once PRN Nando Oliveros MD     • lactated ringers  125 mL/hr Intravenous Continuous Nando Oliveros  mL/hr (11/23/22 1030)   • naloxone  0 04 mg Intravenous Q1MIN PRN Nando Oliveros MD     • nystatin   Topical BID Nando Oliveros MD     • ondansetron  4 mg Intravenous Q6H PRN Nando Oliveros MD     • oxyCODONE  5 mg Oral Q4H PRN Nando Oliveros MD     • pantoprazole  40 mg Oral Early Morning Nando Oliveros MD     • polyethylene glycol  17 g Oral Daily PRN Philly Ríos MD     • QUEtiapine  25 mg Oral HS Philly Ríos MD     • senna  1 tablet Oral HS PRN Philly Ríos MD     • sertraline  150 mg Oral Daily Philly Ríos MD     • sodium chloride  500 mL Intravenous Once PRN Philly Ríos MD      And   • sodium chloride  500 mL Intravenous Once PRN Philly Ríos MD          Today, Patient Was Seen By: Dick Mcbride MD    ** Please Note: Dictation voice to text software may have been used in the creation of this document   **

## 2022-11-23 NOTE — ASSESSMENT & PLAN NOTE
Lab Results   Component Value Date    HGBA1C 9 8 (H) 10/25/2022     Recent Labs     11/22/22  2051 11/23/22  0954 11/23/22  1104 11/23/22  1634   POCGLU 116 119 121 103     · Inpatient regimen: Sliding scale, Glargine 15U HS

## 2022-11-23 NOTE — ASSESSMENT & PLAN NOTE
65year old female is currently admitted due to a moderate to large volume left MCA territory stroke  · Repeat CT head showed an evolving acute infarct in right parietal-temporal-occipital lobes with questionable petechial cortical hemorrhage in right parietal lobe  Discussed with Dr Dillon Barcenas  Recommended that we continue anticoagulation for now due to the clinical insignificance of the size and the risk of taking her off anticoagulation far outweighs the benefits off of it    · Continue argatroban until all surgical interventions are completed

## 2022-11-23 NOTE — ASSESSMENT & PLAN NOTE
Uncontrolled diabetes, hypertension, hyperlipidemia  Lost to follow-up and poorly compliant to diabetic regimen  · S/p abdominal aortogram,  LLE SFA shockwave angioplasty/FATOU 10/31/22   · S/p R femoral endarterectomy 11/7  · S/p aspiration thrombectomy in right common iliac artery and left SFA  · Heparin drip transitioned to Argatroban in the setting of thrombocytopenia and concern for HIT     · Continue statin / Plavix  · PICC line in place  · S/p L RAMEZ today will await next step in management from vascular surgery and the timing of when to switch her to a DOAC

## 2022-11-23 NOTE — ANESTHESIA PROCEDURE NOTES
Peripheral Block    Patient location during procedure: holding area  Start time: 11/23/2022 7:35 AM  Reason for block: at surgeon's request and post-op pain management  Staffing  Anesthesiologist: Pearl Leung,   Preanesthetic Checklist  Completed: patient identified, IV checked, site marked, risks and benefits discussed, surgical consent, monitors and equipment checked, pre-op evaluation and timeout performed  Peripheral Block  Patient position: supine  Prep: ChloraPrep  Patient monitoring: heart rate, cardiac monitor, frequent blood pressure checks and continuous pulse ox  Block type: femoral  Laterality: left  Injection technique: single-shot  Procedures: ultrasound guided, Ultrasound guidance required for the procedure to increase accuracy and safety of medication placement and decrease risk of complications  and nerve stimulator  Ultrasound permanent image savedropivacaine (NAROPIN) 0 5 % - Perineural   20 mL - 11/23/2022 7:38:00 AM  midazolam (VERSED) 2 mg/2 mL - Intravenous   2 mg - 11/23/2022 7:35:00 AM  fentaNYL 50 mcg/mL - Intravenous   100 mcg - 11/23/2022 7:35:00 AM  Needle  Needle type: Stimuplex   Needle gauge: 22 G  Needle length: 10 cm  Needle localization: anatomical landmarks, nerve stimulator and ultrasound guidance  Assessment  Injection assessment: incremental injection, local visualized surrounding nerve on ultrasound, negative aspiration for heme and no paresthesia on injection  Paresthesia pain: none  Heart rate change: no  Slow fractionated injection: yes  Post-procedure:  site cleaned  patient tolerated the procedure well with no immediate complications  Additional Notes  Attempted sciatic nerve block    Unable to position patient appropriately unable to visualize anteriorly and abandoned

## 2022-11-23 NOTE — PROGRESS NOTES
[address] [facility]  Progress Note - [address] [facility] [address] 1960, 65 y o  female MRN: [id number]  Unit/Bed#: OR [place] Encounter: [id number]  Primary Care Provider: ALEX Arreguin   Date and time admitted to hospital: 10/21/2022  7:58 PM    PAD (peripheral artery disease) [facility])  Assessment & Plan  65year old female former smoker w/HTN, HLD, uncontrolled type II DM (A1c 9 8), hx CVA, presenting with nonhealing extensive L heel ulceration and R hallux and 5th digit ulceration  Vascular surgery consulted for input  S/p multiple b/l endovascular interventions  JUSTIN  R hemispheric CVA    Recommendations:  -Bilateral tissue loss in the setting of uncontrolled type II DM and NYDIA on admission, now s/p multiple angiograms w/intervention    -Now s/p multiple bilateral lower extremity endovascular interventions, c/b atheroembolic event to the RLE and JUSTIN with R hemispheric CVA  -Patient will require bilateral lower extremity major amputations  Would recommend holding off on the RLE to allow poor perfused superficial tissue loss to demarcate/slough to allow us to determine level of amputation  Do recommend proceeding with LLE amputation next week  -Extensive conversation with patient regarding level of LLE amputation  Could consider attempting LLE BKA with possibility of requiring more proximal amputation (revision to AKA) vs proceeding with AKA  However, patient wishes to forgo attempt at [address] and proceed with L AKA  Currently scheduled for today, 11/23/22   -Cardiology consulted  Appreciate recommendations  -Medical management with ASA, statin, Plavix  -Medical management and glucose control per SLIM  Continue to trend Hgb  -Appreciate ID input  Continue to trend vitals  If patient becomes septic or systemically ill (presumed to be secondary to RLE), would consider guillotine amputation in the emergent setting   However will try to avoid this as long as possible   -Cont argatroban drip in setting of [facility]  -Consent obtained, d/w Dr Seymour Racer: Patient seen and examined in holding  In agreement to proceed with amputation of the LLE today  Remains stable  All questions/concerns addressed    Vitals:  BP (!) 174/74   Pulse 84   Temp 97 6 °F (36 4 °C) (Temporal)   Resp 16   Ht 5' 4" (1 626 m)   Wt 98 6 kg (217 lb 6 oz)   SpO2 96%   BMI 37 31 kg/m²     I/Os:  I/O last 3 completed shifts: In: 386 1 [I V :36 1; Blood:350]  Out: 1958 [Urine:2850]  I/O this shift:  In: 100 [I V :100]  Out: -     Lab Results and Cultures:   CBC with diff: Lab Results   Component Value Date    WBC 13 09 (H) 11/23/2022    HGB 8 7 (L) 11/23/2022    HCT 27 7 (L) 11/23/2022    MCV 87 11/23/2022     (H) 11/23/2022    MCH 27 3 11/23/2022    MCHC 31 4 11/23/2022    RDW 15 3 (H) 11/23/2022    MPV 8 3 (L) 11/23/2022    NRBC 0 11/23/2022   ,   BMP/CMP:  Lab Results   Component Value Date    SODIUM 143 11/23/2022    K 3 3 (L) 11/23/2022     11/23/2022    CO2 29 11/23/2022    BUN 7 11/23/2022    CREATININE 0 80 11/23/2022    CALCIUM 7 8 (L) 11/23/2022     (H) 11/13/2022    ALT 53 11/13/2022    ALKPHOS 232 (H) 11/13/2022    EGFR 79 11/23/2022   ,   Lipid Panel: No results found for: CHOL,   Coags:   Lab Results   Component Value Date    PTT 55 (H) 11/23/2022    INR 3 70 (H) 11/11/2022   ,     Blood Culture:   Lab Results   Component Value Date    BLOODCX No Growth After 5 Days   11/13/2022    BLOODCX Staphylococcus coagulase negative (A) 11/13/2022   ,   Urinalysis: Lab Results   Component Value Date    COLORU Yellow 11/12/2022    CLARITYU Slightly Cloudy 11/12/2022    SPECGRAV 1 025 11/12/2022    PHUR 5 5 11/12/2022    PHUR 6 5 02/23/2018    LEUKOCYTESUR Trace (A) 11/12/2022    NITRITE Positive (A) 11/12/2022    GLUCOSEU Negative 11/12/2022    KETONESU Negative 11/12/2022    BILIRUBINUR Negative 11/12/2022    BLOODU Large (A) 11/12/2022   ,   Urine Culture:   Lab Results   Component Value Date    [facility] >100,000 cfu/ml Enterobacter cloacae complex (A) 11/12/2022    URINECX 70,000-79,000 cfu/ml Kluyveromyces marxianus (A) 11/12/2022   ,   Wound Culure: No results found for: WOUNDCULT    Medications:  Current Facility-Administered Medications   Medication Dose Route Frequency   • [MAR Hold] acetaminophen (TYLENOL) tablet 650 mg  650 mg Oral Q6H [facility]   • [MAR Hold] ALPRAZolam (XANAX) tablet 0 25 mg  0 25 mg Oral Daily PRN   • [MAR Hold] amLODIPine (NORVASC) tablet 10 mg  10 mg Oral Daily   • argatroban infusion (premix)  0 1-3 mcg/kg/min Intravenous Titrated   • [MAR Hold] atorvastatin (LIPITOR) tablet 40 mg  40 mg Oral Daily With Dinner   • ceFAZolin (ANCEF) IVPB (premix in dextrose) 2,000 mg 50 mL  2,000 mg Intravenous Once   • [MAR Hold] clopidogrel (PLAVIX) tablet 75 mg  75 mg Oral Daily   • [MAR Hold] doxazosin (CARDURA) tablet 2 mg  2 mg Oral Daily   • [MAR Hold] gabapentin (NEURONTIN) capsule 200 mg  200 mg Oral BID   • heparin (porcine) 2,000 Units, papaverine 60 mg in multi-electrolyte (ISOLYTE-S PH 7 4 equivalent) 500 mL irrigation   Irrigation Once   • [MAR Hold] HYDROmorphone (DILAUDID) injection 0 5 mg  0 5 mg Intravenous Q3H PRN   • [MAR Hold] insulin glargine (LANTUS) subcutaneous injection 15 Units 0 15 mL  15 Units Subcutaneous HS   • [MAR Hold] insulin lispro (HumaLOG) 100 units/mL subcutaneous injection 1-6 Units  1-6 Units Subcutaneous TID AC   • lactated ringers infusion  125 mL/hr Intravenous Continuous   • [MAR Hold] naloxone (NARCAN) 0 04 mg/mL syringe 0 04 mg  0 04 mg Intravenous Q1MIN PRN   • [MAR Hold] nystatin (MYCOSTATIN) powder   Topical BID   • [MAR Hold] ondansetron (ZOFRAN) injection 4 mg  4 mg Intravenous Q6H PRN   • [MAR Hold] oxyCODONE (ROXICODONE) IR tablet 5 mg  5 mg Oral Q4H PRN   • [MAR Hold] pantoprazole (PROTONIX) EC tablet 40 mg  40 mg Oral Early Morning   • [MAR Hold] polyethylene glycol (MIRALAX) packet 17 g  17 g Oral Daily PRN   • [MAR Hold] QUEtiapine (SEROquel) tablet 25 mg  25 mg Oral HS   • [MAR Hold] senna (SENOKOT) tablet 8 6 mg  1 tablet Oral HS PRN   • [MAR Hold] sertraline (ZOLOFT) tablet 150 mg  150 mg Oral Daily       Imaging:  Reviewed    Physical Exam:    General: Alert and oriented   In no acute distress  CV: Regular rate   Respiratory: Normal effort  Abdominal: Soft, non-distended   Extremities: Bilateral extensive tissue loss to the lower extremities   Neurologic: Grossly normal       Margene Claude, PA-C  11/23/2022

## 2022-11-23 NOTE — NURSING NOTE
I took over care for the patient at 2300 until 0300  Patient was asleep when assessed  Will continue to monitor

## 2022-11-23 NOTE — ASSESSMENT & PLAN NOTE
Echo on 11/07 shows 04% LVEF, no diastolic dysfunction   Postprocedural hypoxia  · Currently on 1 5L NC

## 2022-11-23 NOTE — ANESTHESIA POSTPROCEDURE EVALUATION
Post-Op Assessment Note    CV Status:  Stable    Pain management: adequate     Mental Status:  Alert and awake   Hydration Status:  Euvolemic   PONV Controlled:  Controlled   Airway Patency:  Patent      Post Op Vitals Reviewed: Yes      Staff: Anesthesiologist         No notable events documented      /72 (11/23/22 1004)    Temp      Pulse 76 (11/23/22 1004)   Resp 17 (11/23/22 1004)    SpO2 97 % (11/23/22 1009)

## 2022-11-23 NOTE — ASSESSMENT & PLAN NOTE
S/p 4U pRBC   Will monitor for post-op bleeding    Recent Labs     11/22/22  0542 11/23/22  0504   HGB 7 9* 8 7*

## 2022-11-23 NOTE — ASSESSMENT & PLAN NOTE
Likely due to GI losses   Resolved  · Continue repleting PRN    Recent Labs     11/21/22  0520 11/22/22  0542 11/23/22  0504   K 3 8 3 5 3 3*   MG 1 8 1 7 1 6

## 2022-11-23 NOTE — UTILIZATION REVIEW
Continued Stay Review    Date:    11/23/22                          Current Patient Class:    Inpatient  Current Level of Care:    Med surg    HPI:62 y o  female initially admitted on    10/21  With stroke     Assessment/Plan:   11/23   Surgery date    Procedure(s) (LRB):  (AKA) (Left)    Operative Findings:  Healthy tissue at the level of amputation    Resume   Argatroban   4 hrs post op          Vital Signs:   36 7 °C) 78 16 148/66 95 93 % 24 1 L/min Nasal cannula -- Lying    11/23/22 1049 -- 78 13 159/69 98 96 % -- -- -- WDL --   11/23/22 1034 97 6 °F (36 4 °C) 80 12 162/70 101 96 % -- -- -- -- --   11/23/22 1019 -- 76 15 165/71 102 -- -- -- -- -- --   11/23/22 1009 -- -- -- -- -- 97 % -- -- -- -- --   11/23/22 1004 -- 76 17 161/72 103 -- -- -- -- -- --   11/23/22 0949 97 5 °F (36 4 °C) 74 15 162/70 100 95 % 28 2 L/min Nasal cannula WDL --   11/23/22 0752 -- 84 16 174/74 Abnormal  -- 96 % 32 3 L/min Nasal cannula -- --   11/23/22 0747 -- 86 16 172/71 Abnormal  -- 96 % 32 3 L/min Nasal cannula -- --   11/23/22 0746 -- 85 -- 172/71 Abnormal  -- 96 % 32 3 L/min Nasal cannula -- --   11/23/22 0741 -- 86 -- 173/74 Abnormal  -- 96 % 32 3 L/min Nasal cannula -- --   11/23/22 0737 -- 84 -- 189/81 Abnormal  -- 97 % 32 3 L/min Nasal cannula -- --   11/23/22 0732 -- 86 -- 188/84 Abnormal  -- 97 % 32 3 L/min Nasal cannula -- --   11/23/22 0730 -- 96 -- 192/81 Abnormal  -- 96 % -- -- None (Room            Pertinent Labs/Diagnostic Results:       Results from last 7 days   Lab Units 11/23/22  0504 11/22/22  0542 11/21/22  0520 11/19/22  1505 11/19/22  0728   WBC Thousand/uL 13 09* 14 11* 14 03*  --  16 96*   HEMOGLOBIN g/dL 8 7* 7 9* 6 8* 7 2* 7 2*   HEMATOCRIT % 27 7* 26 3* 22 3* 23 3* 23 4*   PLATELETS Thousands/uL 418* 421* 445*  --  384   NEUTROS ABS Thousands/µL 10 65* 11 41* 11 46*  --   --          Results from last 7 days   Lab Units 11/23/22  0504 11/22/22  0542 11/21/22  0520 11/19/22  0549 11/18/22  0410 11/18/22  0401   SODIUM mmol/L 143 142 140 140 139  --    POTASSIUM mmol/L 3 3* 3 5 3 8 3 3* 3 4*  --    CHLORIDE mmol/L 105 105 104 103 103  --    CO2 mmol/L 29 30 30 30 31  --    ANION GAP mmol/L 9 7 6 7 5  --    BUN mg/dL 7 7 8 9 10  --    CREATININE mg/dL 0 80 0 79 0 81 1 01 1 10  --    EGFR ml/min/1 73sq m 79 81 78 60 54  --    CALCIUM mg/dL 7 8* 7 7* 7 7* 7 7* 7 8*  --    MAGNESIUM mg/dL 1 6 1 7 1 8 1 8  --  1 8   PHOSPHORUS mg/dL  --   --   --  4 3*  --   --          Results from last 7 days   Lab Units 11/23/22  1104 11/23/22  0954 11/22/22  2051 11/22/22  1538 11/22/22  1142 11/22/22  0735 11/21/22  2056 11/21/22  1600 11/21/22  1059 11/21/22  0710 11/20/22  2158 11/20/22  1546   POC GLUCOSE mg/dl 121 119 116 134 167* 141* 153* 156* 211* 152* 188* 150*     Results from last 7 days   Lab Units 11/23/22  0504 11/22/22  0542 11/21/22  0520 11/19/22  0549 11/18/22  0410 11/17/22  0504   GLUCOSE RANDOM mg/dL 107 134 140 124 142* 126               Results from last 7 days   Lab Units 11/23/22  0311 11/22/22  2232 11/22/22  0542   PTT seconds 55* 58* 72*         Medications:   Scheduled Medications:  acetaminophen, 650 mg, Oral, Q6H [address]  amLODIPine, 10 mg, Oral, Daily  atorvastatin, 40 mg, Oral, Daily With Dinner  clopidogrel, 75 mg, Oral, Daily  doxazosin, 2 mg, Oral, Daily  gabapentin, 200 mg, Oral, BID  insulin glargine, 15 Units, Subcutaneous, HS  insulin lispro, 1-6 Units, Subcutaneous, TID AC  nystatin, , Topical, BID  pantoprazole, 40 mg, Oral, Early Morning  QUEtiapine, 25 mg, Oral, HS  sertraline, 150 mg, Oral, Daily      Continuous IV Infusions:  argatroban, 0 1-3 mcg/kg/min, Intravenous, Titrated  lactated ringers, 125 mL/hr, Intravenous, Continuous      PRN Meds:  ALPRAZolam, 0 25 mg, Oral, Daily PRN  HYDROmorphone, 0 5 mg, Intravenous, Q3H PRN  lactated ringers, 500 mL, Intravenous, Once PRN   And  lactated ringers, 500 mL, Intravenous, Once PRN  naloxone, 0 04 mg, Intravenous, Q1MIN PRN  ondansetron, 4 mg, Intravenous, Q6H PRN  oxyCODONE, 5 mg, Oral, Q4H PRN  polyethylene glycol, 17 g, Oral, Daily PRN  senna, 1 tablet, Oral, HS PRN  sodium chloride, 500 mL, Intravenous, Once PRN   And  sodium chloride, 500 mL, Intravenous, Once PRN        Discharge Plan:   TBD    Network Utilization Review Department  ATTENTION: Please call with any questions or concerns to [phone] and carefully listen to the prompts so that you are directed to the right person  All voicemails are confidential   Alexx Villanueva all requests for admission clinical reviews, approved or denied determinations and any other requests to dedicated fax number below belonging to the campus where the patient is receiving treatment   List of dedicated fax numbers for the Facilities:  [address] DENIALS (Administrative/Medical Necessity) [phone]   [address] (Maternity/NICU/Pediatrics) [phone]   [address] [phone]   [facility] [phone]   [facility] [address] [place] [address] [facility] 28 [phone]   [address] [facility] 134 815 [facility] [phone]

## 2022-11-23 NOTE — ASSESSMENT & PLAN NOTE
65year old female former smoker w/HTN, HLD, uncontrolled type II DM (A1c 9 8), hx CVA, presenting with nonhealing extensive L heel ulceration and R hallux and 5th digit ulceration  Vascular surgery consulted for input  S/p multiple b/l endovascular interventions  JUSTIN  R hemispheric CVA    Recommendations:  -Bilateral tissue loss in the setting of uncontrolled type II DM and NYDIA on admission, now s/p multiple angiograms w/intervention    -Now s/p multiple bilateral lower extremity endovascular interventions, c/b atheroembolic event to the RLE and JUSTIN with R hemispheric CVA  -Patient will require bilateral lower extremity major amputations  Would recommend holding off on the RLE to allow poor perfused superficial tissue loss to demarcate/slough to allow us to determine level of amputation  Do recommend proceeding with LLE amputation next week  -Extensive conversation with patient regarding level of LLE amputation  Could consider attempting LLE BKA with possibility of requiring more proximal amputation (revision to AKA) vs proceeding with AKA  However, patient wishes to forgo attempt at [address] [place] and proceed with L AKA  Currently scheduled for today, 11/23/22   -Cardiology consulted  Appreciate recommendations  -Medical management with ASA, statin, Plavix  -Medical management and glucose control per SLIM  Continue to trend Hgb  -Appreciate ID input  Continue to trend vitals  If patient becomes septic or systemically ill (presumed to be secondary to RLE), would consider guillotine amputation in the emergent setting   However will try to avoid this as long as possible   -Cont argatroban drip in setting of JUSTIN  -Consent obtained, d/w Dr Sarah Re

## 2022-11-24 LAB
ANION GAP SERPL CALCULATED.3IONS-SCNC: 10 MMOL/L (ref 4–13)
ANISOCYTOSIS BLD QL SMEAR: PRESENT
APTT PPP: 54 SECONDS (ref 23–37)
APTT PPP: 73 SECONDS (ref 23–37)
APTT PPP: 86 SECONDS (ref 23–37)
BASOPHILS # BLD MANUAL: 0 THOUSAND/UL (ref 0–0.1)
BASOPHILS NFR MAR MANUAL: 0 % (ref 0–1)
BUN SERPL-MCNC: 9 MG/DL (ref 5–25)
CALCIUM SERPL-MCNC: 7.9 MG/DL (ref 8.3–10.1)
CHLORIDE SERPL-SCNC: 102 MMOL/L (ref 96–108)
CO2 SERPL-SCNC: 29 MMOL/L (ref 21–32)
CREAT SERPL-MCNC: 0.71 MG/DL (ref 0.6–1.3)
DACRYOCYTES BLD QL SMEAR: PRESENT
EOSINOPHIL # BLD MANUAL: 0 THOUSAND/UL (ref 0–0.4)
EOSINOPHIL NFR BLD MANUAL: 0 % (ref 0–6)
ERYTHROCYTE [DISTWIDTH] IN BLOOD BY AUTOMATED COUNT: 15.2 % (ref 11.6–15.1)
GFR SERPL CREATININE-BSD FRML MDRD: 92 ML/MIN/1.73SQ M
GLUCOSE SERPL-MCNC: 113 MG/DL (ref 65–140)
GLUCOSE SERPL-MCNC: 117 MG/DL (ref 65–140)
GLUCOSE SERPL-MCNC: 119 MG/DL (ref 65–140)
GLUCOSE SERPL-MCNC: 134 MG/DL (ref 65–140)
GLUCOSE SERPL-MCNC: 141 MG/DL (ref 65–140)
HCT VFR BLD AUTO: 15.1 % (ref 34.8–46.1)
HCT VFR BLD AUTO: 27.9 % (ref 34.8–46.1)
HGB BLD-MCNC: 4.8 G/DL (ref 11.5–15.4)
HGB BLD-MCNC: 8.8 G/DL (ref 11.5–15.4)
HYPERCHROMIA BLD QL SMEAR: PRESENT
INR PPP: 2.4 (ref 0.84–1.19)
LYMPHOCYTES # BLD AUTO: 12 % (ref 14–44)
LYMPHOCYTES # BLD AUTO: 2.7 THOUSAND/UL (ref 0.6–4.47)
MCH RBC QN AUTO: 28.4 PG (ref 26.8–34.3)
MCHC RBC AUTO-ENTMCNC: 31.8 G/DL (ref 31.4–37.4)
MCV RBC AUTO: 89 FL (ref 82–98)
MONOCYTES # BLD AUTO: 0 THOUSAND/UL (ref 0–1.22)
MONOCYTES NFR BLD: 0 % (ref 4–12)
NEUTROPHILS # BLD MANUAL: 19.8 THOUSAND/UL (ref 1.85–7.62)
NEUTS SEG NFR BLD AUTO: 88 % (ref 43–75)
PLATELET # BLD AUTO: 538 THOUSANDS/UL (ref 149–390)
PLATELET BLD QL SMEAR: ABNORMAL
PMV BLD AUTO: 8.4 FL (ref 8.9–12.7)
POLYCHROMASIA BLD QL SMEAR: PRESENT
POTASSIUM SERPL-SCNC: 3.6 MMOL/L (ref 3.5–5.3)
PROTHROMBIN TIME: 26.1 SECONDS (ref 11.6–14.5)
RBC # BLD AUTO: 1.69 MILLION/UL (ref 3.81–5.12)
SODIUM SERPL-SCNC: 141 MMOL/L (ref 135–147)
TARGETS BLD QL SMEAR: PRESENT
WBC # BLD AUTO: 22.5 THOUSAND/UL (ref 4.31–10.16)

## 2022-11-24 PROCEDURE — 85007 BL SMEAR W/DIFF WBC COUNT: CPT | Performed by: STUDENT IN AN ORGANIZED HEALTH CARE EDUCATION/TRAINING PROGRAM

## 2022-11-24 PROCEDURE — NC001 PR NO CHARGE: Performed by: SURGERY

## 2022-11-24 PROCEDURE — 85014 HEMATOCRIT: CPT

## 2022-11-24 PROCEDURE — 82948 REAGENT STRIP/BLOOD GLUCOSE: CPT

## 2022-11-24 PROCEDURE — 99232 SBSQ HOSP IP/OBS MODERATE 35: CPT | Performed by: STUDENT IN AN ORGANIZED HEALTH CARE EDUCATION/TRAINING PROGRAM

## 2022-11-24 PROCEDURE — 85730 THROMBOPLASTIN TIME PARTIAL: CPT | Performed by: STUDENT IN AN ORGANIZED HEALTH CARE EDUCATION/TRAINING PROGRAM

## 2022-11-24 PROCEDURE — 85018 HEMOGLOBIN: CPT

## 2022-11-24 PROCEDURE — 85610 PROTHROMBIN TIME: CPT | Performed by: STUDENT IN AN ORGANIZED HEALTH CARE EDUCATION/TRAINING PROGRAM

## 2022-11-24 PROCEDURE — 85025 COMPLETE CBC W/AUTO DIFF WBC: CPT | Performed by: STUDENT IN AN ORGANIZED HEALTH CARE EDUCATION/TRAINING PROGRAM

## 2022-11-24 PROCEDURE — 97530 THERAPEUTIC ACTIVITIES: CPT

## 2022-11-24 PROCEDURE — 80048 BASIC METABOLIC PNL TOTAL CA: CPT | Performed by: STUDENT IN AN ORGANIZED HEALTH CARE EDUCATION/TRAINING PROGRAM

## 2022-11-24 RX ORDER — SENNOSIDES 8.6 MG
1 TABLET ORAL 2 TIMES DAILY
Status: DISCONTINUED | OUTPATIENT
Start: 2022-11-24 | End: 2023-01-12 | Stop reason: HOSPADM

## 2022-11-24 RX ADMIN — PANTOPRAZOLE SODIUM 40 MG: 40 TABLET, DELAYED RELEASE ORAL at 05:02

## 2022-11-24 RX ADMIN — ACETAMINOPHEN 650 MG: 325 TABLET, FILM COATED ORAL at 11:44

## 2022-11-24 RX ADMIN — INSULIN GLARGINE 15 UNITS: 100 INJECTION, SOLUTION SUBCUTANEOUS at 22:17

## 2022-11-24 RX ADMIN — SERTRALINE HYDROCHLORIDE 150 MG: 100 TABLET ORAL at 08:19

## 2022-11-24 RX ADMIN — QUETIAPINE FUMARATE 25 MG: 25 TABLET ORAL at 22:17

## 2022-11-24 RX ADMIN — AMLODIPINE BESYLATE 10 MG: 10 TABLET ORAL at 08:19

## 2022-11-24 RX ADMIN — ACETAMINOPHEN 650 MG: 325 TABLET, FILM COATED ORAL at 05:02

## 2022-11-24 RX ADMIN — GABAPENTIN 200 MG: 100 CAPSULE ORAL at 08:19

## 2022-11-24 RX ADMIN — ATORVASTATIN CALCIUM 40 MG: 40 TABLET, FILM COATED ORAL at 17:50

## 2022-11-24 RX ADMIN — NYSTATIN 1 APPLICATION.: 100000 POWDER TOPICAL at 17:55

## 2022-11-24 RX ADMIN — SENNOSIDES 8.6 MG: 8.6 TABLET, FILM COATED ORAL at 14:18

## 2022-11-24 RX ADMIN — DOXAZOSIN 2 MG: 2 TABLET ORAL at 08:20

## 2022-11-24 RX ADMIN — NYSTATIN: 100000 POWDER TOPICAL at 08:19

## 2022-11-24 RX ADMIN — GABAPENTIN 200 MG: 100 CAPSULE ORAL at 17:50

## 2022-11-24 RX ADMIN — CLOPIDOGREL BISULFATE 75 MG: 75 TABLET ORAL at 08:19

## 2022-11-24 RX ADMIN — SENNOSIDES 8.6 MG: 8.6 TABLET, FILM COATED ORAL at 17:50

## 2022-11-24 RX ADMIN — ONDANSETRON 4 MG: 2 INJECTION INTRAMUSCULAR; INTRAVENOUS at 05:20

## 2022-11-24 RX ADMIN — ACETAMINOPHEN 650 MG: 325 TABLET, FILM COATED ORAL at 17:51

## 2022-11-24 NOTE — ASSESSMENT & PLAN NOTE
65year old female is currently admitted due to a moderate to large volume left MCA territory stroke  · Repeat CT head showed an evolving acute infarct in right parietal-temporal-occipital lobes with questionable petechial cortical hemorrhage in right parietal lobe  Discussed with Dr Adelso Baxter  Recommended that we continue anticoagulation for now due to the clinical insignificance of the size and the risk of taking her off anticoagulation far outweighs the benefits off of it    · Continue argatroban until all surgical interventions are completed

## 2022-11-24 NOTE — ASSESSMENT & PLAN NOTE
S/p left aka 11/23/2022   - Post-op wound management per vascular surgery  - Await next step in management from vascular surgery, currently planned for possible right BKA  TBD

## 2022-11-24 NOTE — PLAN OF CARE
Problem: PHYSICAL THERAPY ADULT  Goal: Performs mobility at highest level of function for planned discharge setting  See evaluation for individualized goals  Description: Treatment/Interventions: Functional transfer training, LE strengthening/ROM, Therapeutic exercise, Endurance training, Patient/family training, Equipment eval/education, Bed mobility, Compensatory technique education, Gait training, Continued evaluation, Spoke to nursing, Spoke to MD, Spoke to case management, OT          See flowsheet documentation for full assessment, interventions and recommendations  Outcome: Not Progressing  Note: Prognosis: Guarded  Problem List: Decreased strength, Decreased endurance, Decreased range of motion, Impaired balance, Decreased mobility, Pain, Decreased skin integrity, Obesity, Impaired judgement  Assessment: Pt s/p L AKA on 11/23/22  Pt seen for PT per POC  Pt declined bedlevel thera  ex as well as to attempt sitting EOB 2* to pain & fatigue  Pt educated on importance of mobility & effects of immobility w/ good understanding however continue to request to defer mobility training & thera  ex at this time  Will continue to recommend andree lift for OOB transfers  Nsg staff most recent vital signs as follows: /70 (BP Location: Left arm)   Pulse 87   Temp 97 8 °F (36 6 °C) (Temporal)   Resp 18   Ht 5' 4" (1 626 m)   Wt 95 kg (209 lb 7 oz)   SpO2 92%   BMI 35 95 kg/m²   Will continue PT per POC  Current PT goals still appropriate, will extend [id number]  At end of session, pt remain in bed in stable condition  All needs in reach  Bed alarm activated  All lines intact    Fall precautions reinforced w/ good understanding  The patient's AM-PAC Basic Mobility Inpatient Short Form Raw Score is 6  A Raw score of less than or equal to 16 suggests the patient may benefit from discharge to post-acute rehabilitation services   Please also refer to the recommendation of the Physical Therapist for safe discharge planning  From PT standpoint, will continue to recommend inpt rehab at D/C  CM to follow  Nsg staff to continue to mobilized pt as tolerated/appropriate to prevent decline in function  Nsg notified  PT Discharge Recommendation: Post acute rehabilitation services    See flowsheet documentation for full assessment

## 2022-11-24 NOTE — PROGRESS NOTES
[address] [place] [address]  Progress Note - Rowdy Contreras 1960, 65 y o  female MRN: [id number]  Unit/Bed#: E4 -01 Encounter: [id number]  Primary Care Provider: ALEX Marie   Date and time admitted to hospital: 10/21/2022  7:58 PM    PAD (peripheral artery disease) [facility])  Assessment & Plan  65year old female former smoker w/HTN, HLD, uncontrolled type II DM (A1c 9 8), hx CVA, presenting with nonhealing extensive L heel ulceration and R hallux and 5th digit ulceration  Vascular surgery consulted for input  S/p multiple b/l endovascular interventions  JUSTIN  R hemispheric CVA    Recommendations:  -Bilateral tissue loss in the setting of uncontrolled type II DM and NYDIA on admission, now s/p multiple angiograms w/intervention    -Now s/p multiple bilateral lower extremity endovascular interventions, c/b atheroembolic event to the RLE and JUSTIN with R hemispheric CVA  -she is now s/p left AKA on 11/23  - will continue to observe RLE to allow poor perfused superficial tissue loss to demarcate/slough to allow us to determine level of necessary RLE amputation, likely to take place next week  -Medical management with ASA, statin, Plavix  -Medical management and glucose control per SLIM  Continue to trend Hgb  -If patient becomes septic or systemically ill (presumed to be secondary to RLE), would consider guillotine amputation in the emergent setting  However will try to avoid this as long as possible   -Cont argatroban drip in setting of JUSTIN  -continue PCA pain management  -maintain Lorrane Rehana until 11/30        Subjective/Objective     Subjective:  Patient had issues with pain overnight, now improved with the use of a PCA pump  Patient still reports nausea, however it is controlled with the use of Zofran  Objective:   Vitals: Blood pressure (!) 176/77, pulse 86, temperature 97 8 °F (36 6 °C), temperature source Temporal, resp   rate 20, height 5' 4" (1 626 m), weight 95 kg (209 lb 7 oz), SpO2 96 %, not currently breastfeeding  ,Body mass index is 35 95 kg/m²  I/O       11/22 0701  11/23 0700 11/23 0701  11/24 0700 11/24 0701 11/25 0700    P  O   480     I V  (mL/kg) 36 1 (0 4) 1277 9 (13 5)     Blood 350      IV Piggyback 0 50     Total Intake(mL/kg) 386 1 (3 9) 1807 9 (19)     Urine (mL/kg/hr) 2000 (0 8) 1650 (0 7)     Stool       Blood  262     Total Output 2000 1912     Net -1613 9 -104 1                  Physical Exam:  Gen: NAD, Aox3, Comfortable in bed  Chest: Normal work of breathing, no respiratory distress  Abd: Soft, ND, NT  Ext:  LLE BKA stump is moderately tender  [place] Paget in place holding suction  RLE is insensate at the foot  Calf is blistering with wound escalating up the leg  Posterior leg is moist and weeping with tenderness at the calf  Skin: Warm, Dry, Intact      Lab, Imaging and other studies:   I have personally reviewed pertinent reports    , CBC with diff: No results found for: WBC, HGB, HCT, MCV, PLT, ADJUSTEDWBC, MCH, MCHC, RDW, MPV, NRBC, BMP/CMP:   Lab Results   Component Value Date    SODIUM 141 11/24/2022    K 3 6 11/24/2022     11/24/2022    CO2 29 11/24/2022    BUN 9 11/24/2022    CREATININE 0 71 11/24/2022    CALCIUM 7 9 (L) 11/24/2022    EGFR 92 11/24/2022     VTE Pharmacologic Prophylaxis:  Argatroban          Nando Oliveros MD  11/24/2022  7:23 AM

## 2022-11-24 NOTE — QUICK NOTE
Postop Note - vascular Surgery  Elveria Im 65 y o  female MRN: [id number]  Unit/Bed#: E4 -01 Encounter: [id number]    Assessment:  65 y o  female now Day of Surgery s/p Procedure(s) (LRB):  (AKA) (Left)  Patient with significant postoperative discomfort  Plan:  - Diet Regular; Regular House; Consistent Carbohydrate Diet Level 2 (5 carb servings/75 grams CHO/meal)  - will order PCA for optimize Pain control  - Nausea control PRN  Will add Reglan as 2nd antiemetic agent  - Incentive spirometry  - OOB, ambulate  - patient is still pending planning for RLE amputation  - continue argatroban drip for treatment of JUSTIN  - Prevena VAC to remain to 11/30    Subjective/Objective     Subjective:  Patient with significant amount of discomfort and pain in the LLE stump  Patient states that with taking the analgesic medication she feels as though her stomach is upset causing nausea and small volume emesis  Objective:   Vitals: Blood pressure 138/82, pulse 91, temperature 98 2 °F (36 8 °C), temperature source Temporal, resp  rate 18, height 5' 4" (1 626 m), weight 98 6 kg (217 lb 6 oz), SpO2 91 %, not currently breastfeeding  ,Body mass index is 37 31 kg/m²  I/O       11/22 0701 11/23 0700 11/23 0701 11/24 0700    I V  (mL/kg) 36 1 (0 4) 1100 (11 2)    Blood 350     IV Piggyback 0 50    Total Intake(mL/kg) 386 1 (3 9) 1150 (11 7)    Urine (mL/kg/hr) 2000 (0 8) 1000 (0 7)    Stool      Blood  250    Total Output 2000 1250    Net -1613 9 -100                Physical Exam:  Gen: NAD, Aox3, patient appears nauseous and uncomfortable in bed  Chest: Normal work of breathing, no respiratory distress  Abd: Soft, ND, NT  Ext:  Left AKA stump with Prevena VAC intact    Surgical site is exquisitely tender  Skin: Warm, Dry, Intact            Wali Gutierrez MD  11/23/2022  9:30 PM

## 2022-11-24 NOTE — ASSESSMENT & PLAN NOTE
S/p 4U pRBC  Will monitor for post-op bleeding  Hemoglobin of 4 8, likely erroneous given repeat bloodwork coming back normal  No evidence of bleeding at this time      Recent Labs     11/24/22  0527 11/24/22  0813   HGB 4 8* 8 8*

## 2022-11-24 NOTE — ASSESSMENT & PLAN NOTE
Likely due to GI losses   Resolved  · Continue repleting PRN    Recent Labs     11/22/22  0542 11/23/22  0504 11/24/22  0527   K 3 5 3 3* 3 6   MG 1 7 1 6  --

## 2022-11-24 NOTE — ASSESSMENT & PLAN NOTE
Lab Results   Component Value Date    HGBA1C 9 8 (H) 10/25/2022     Recent Labs     11/23/22  1104 11/23/22  1634 11/23/22  2030 11/24/22  0721   POCGLU 121 103 95 134     · Inpatient regimen: Sliding scale, Glargine 15U HS

## 2022-11-24 NOTE — ASSESSMENT & PLAN NOTE
65year old female former smoker w/HTN, HLD, uncontrolled type II DM (A1c 9 8), hx CVA, presenting with nonhealing extensive L heel ulceration and R hallux and 5th digit ulceration  Vascular surgery consulted for input  S/p multiple b/l endovascular interventions  JUSTIN  R hemispheric CVA    Recommendations:  -Bilateral tissue loss in the setting of uncontrolled type II DM and NYDIA on admission, now s/p multiple angiograms w/intervention    -Now s/p multiple bilateral lower extremity endovascular interventions, c/b atheroembolic event to the RLE and JUSTIN with R hemispheric CVA  -she is now s/p left AKA on 11/23  - will continue to observe RLE to allow poor perfused superficial tissue loss to demarcate/slough to allow us to determine level of necessary RLE amputation, likely to take place next week  -Medical management with ASA, statin, Plavix  -Medical management and glucose control per SLIM  Continue to trend Hgb  -If patient becomes septic or systemically ill (presumed to be secondary to RLE), would consider guillotine amputation in the emergent setting   However will try to avoid this as long as possible   -Cont argatroban drip in setting of JUSTIN  -continue PCA pain management  -maintain Koby Never until 11/30

## 2022-11-24 NOTE — ASSESSMENT & PLAN NOTE
Uncontrolled diabetes, hypertension, hyperlipidemia  Lost to follow-up and poorly compliant to diabetic regimen  · Interventions: S/p abdominal aortogram,  LLE SFA shockwave angioplasty/FATOU 10/31/22  S/p R femoral endarterectomy 11/7  S/p aspiration thrombectomy in right common iliac artery and left SFA  · Continue statin / Plavix  · S/p Left AKA 11/23/2022  · Maintain Argatroban (Not on a heparin drip due to concern for thrombocytopenia / HIT) for now since vascular service is considering right BKA

## 2022-11-24 NOTE — PHYSICAL THERAPY NOTE
PT PROGRESS NOTE    Name: Ramy Norton  AGE: 65 y o  MRN: [id number]  LENGTH OF STAY: 35    Admitting Dx:  Hypokalemia [E87 6]  UTI (urinary tract infection) [N39 0]  OREILLY (dyspnea on exertion) [R06 09]  Hypertension [I10]  Necrotic toes (HCC) [I96]  Non healing left heel wound [S91 302A]  Open wound of foot, complicated, left, initial encounter [S91 302A]  Sepsis ([facility] [address] ) [A41 9]      Patient Active Problem List   Diagnosis    Esophageal reflux    Uncontrolled type 2 diabetes with neuropathy    Class 3 severe obesity due to excess calories with serious comorbidity and body mass index (BMI) of 40 0 to 44 9 in adult [facility])    Acute respiratory failure with hypoxia (HCC)    Depression, recurrent (HCC)    Anxiety    Benign essential hypertension    Stroke (HCC)    Diabetic peripheral neuropathy (HCC)    Vitamin D deficiency    Systolic murmur    Familial combined hyperlipidemia    Arthritis    Acute colitis    Moderate protein-calorie malnutrition (HCC)    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 (HCC)    Type 2 diabetes mellitus with hyperglycemia, with long-term current use of insulin (HCC)    Hyperparathyroidism (HCC)    Epigastric pain    Wound of lower extremity    Non healing left heel wound    Hypertensive urgency    Hypokalemia    PAD (peripheral artery disease) (HCC)    Generalized edema due to fluid overload    Acute on chronic anemia    Diabetic ulcer of heel associated with diabetes mellitus due to underlying condition (HCC)    Thrombocytopenia (HCC)    CVA (cerebral vascular accident) ([address] )    UTI (urinary tract infection)    Diarrhea               11/24/22 1350   PT Last Visit   PT Visit Date 11/24/22   Note Type   Note Type Treatment   Pain Assessment   Pain Score 7   Pain Location/Orientation Orientation: Left; Other (Comment)  ([facility])   Hospital Pain Intervention(s) Declines; Emotional support; Rest  (pt declines repositioning; pt has a pain pump)   Bed Mobility   Additional Comments pt refused   Transfers   Sit to Stand Unable to assess   Additional Comments not appropriate, andree lift recommended   Ambulation/Elevation   Gait pattern Not appropriate   Ambulation/Elevation Additional Comments recommend andree lift   Endurance Deficit   Endurance Deficit Yes   Endurance Deficit Description pain; fatigue   Activity Tolerance   Activity Tolerance Patient limited by pain;Treatment limited secondary to medical complications (Comment)   Nurse Made Aware EDGAR Sawant   Assessment   Prognosis Guarded   Problem List Decreased strength;Decreased endurance;Decreased range of motion; Impaired balance;Decreased mobility;Pain;Decreased skin integrity;Obesity; Impaired judgement   Assessment Pt s/p L AKA on 11/23/22  Pt seen for PT per POC  Pt declined bedlevel thera  ex as well as to attempt sitting EOB 2* to pain & fatigue  Pt educated on importance of mobility & effects of immobility w/ good understanding however continue to request to defer mobility training & thera  ex at this time  Will continue to recommend andree lift for OOB transfers  Nsg staff most recent vital signs as follows: /70 (BP Location: Left arm)   Pulse 87   Temp 97 8 °F (36 6 °C) (Temporal)   Resp 18   Ht 5' 4" (1 626 m)   Wt 95 kg (209 lb 7 oz)   SpO2 92%   BMI 35 95 kg/m²   Will continue PT per POC  Current PT goals still appropriate, will extend [id number]  At end of session, pt remain in bed in stable condition  All needs in reach  Bed alarm activated  All lines intact    Fall precautions reinforced w/ good understanding  The patient's AM-PAC Basic Mobility Inpatient Short Form Raw Score is 6  A Raw score of less than or equal to 16 suggests the patient may benefit from discharge to post-acute rehabilitation services  Please also refer to the recommendation of the Physical Therapist for safe discharge planning   From PT standpoint, will continue to recommend inpt rehab at D/C  CM to follow  Ns staff to continue to mobilized pt as tolerated/appropriate to prevent decline in function  Nsg notified  Goals   Patient Goals to get better   STG Expiration Date 12/08/22   Short Term Goal #1 14 days: 1)  Pt will perform bed mobility with modA demonstrating appropriate technique 100% of the time in order to improve function  2)  Tolerate EOB sitting x 30 minutes in order to improve endurance to functional tasks  3)  Improve overall strength and balance 1/2 grade in order to optimize ability to perform functional tasks and reduce fall risk  4) Increase activity tolerance to 45 minutes in order to improve endurance to functional tasks  5)  Further assess functional mobility and revise goals  6) PT for ongoing patient and family/caregiver education, DME needs and d/c planning in order to promote highest level of function in least restrictive environment  PT Treatment Day 6   Plan   Treatment/Interventions LE strengthening/ROM; Therapeutic exercise; Endurance training;Patient/family training;Bed mobility;Spoke to nursing   Progress Slow progress, medical status limitations   PT Frequency 3-5x/wk   Recommendation   PT Discharge Recommendation Post acute rehabilitation services   Equipment Recommended Other (Comment)  (andree lift)   AM-PAC Basic Mobility Inpatient   Turning in Bed Without Bedrails 1   Lying on Back to Sitting on Edge of Flat Bed 1   Moving Bed to Chair 1   Standing Up From Chair 1   Walk in Room 1   Climb 3-5 Stairs 1   Basic Mobility Inpatient Raw Score 6   Turning Head Towards Sound 3   Follow Simple Instructions 3   Low Function Basic Mobility Raw Score 12   Low Function Basic Mobility Standardized Score 18 33   Highest Level Of Mobility   JH-HLM Goal 2: Bed activities/Dependent transfer   JH-HLM Achieved 1: Laying in bed   Education   Education Provided Other  (educated on importance of PT & effects w/ immobility)   Patient Reinforcement needed   End of Consult   Patient Position at End of Consult Supine;Bed/Chair alarm activated; All needs within reach   End of Consult Comments Pt in stable condition  All needs in reach  All lines intact     Watt Pih

## 2022-11-24 NOTE — ASSESSMENT & PLAN NOTE
Possibly due to HIT   Resolved  · Awaiting heparin antibody by serotonin release (performed 11/11)  · Heparin induced antibody elevated    Recent Labs     11/22/22  0542 11/23/22  0504 11/24/22  0527   * 418* 538*

## 2022-11-24 NOTE — ASSESSMENT & PLAN NOTE
Echo on 11/07 shows 14% LVEF, no diastolic dysfunction   Postprocedural hypoxia  · Currently on 1L NC

## 2022-11-24 NOTE — PROGRESS NOTES
[facility]  Progress Note - [address] 1960, 65 y o  female MRN: [id number]  Unit/Bed#: E4 -01 Encounter: [id number]  Primary Care Provider: ALEX Arreguin   Date and time admitted to hospital: 10/21/2022  7:58 PM    * Stroke [facility])  Assessment & Plan  65year old female is currently admitted due to a moderate to large volume left MCA territory stroke  · Repeat CT head showed an evolving acute infarct in right parietal-temporal-occipital lobes with questionable petechial cortical hemorrhage in right parietal lobe  Discussed with Dr Adelso Baxter  Recommended that we continue anticoagulation for now due to the clinical insignificance of the size and the risk of taking her off anticoagulation far outweighs the benefits off of it  · Continue argatroban until all surgical interventions are completed    PAD (peripheral artery disease) ([facility] )  Assessment & Plan  Uncontrolled diabetes, hypertension, hyperlipidemia  Lost to follow-up and poorly compliant to diabetic regimen  · Interventions: S/p abdominal aortogram,  LLE SFA shockwave angioplasty/FATOU 10/31/22  S/p R femoral endarterectomy 11/7  S/p aspiration thrombectomy in right common iliac artery and left SFA  · Continue statin / Plavix  · S/p Left AKA 11/23/2022  · Maintain Argatroban (Not on a heparin drip due to concern for thrombocytopenia / HIT) for now since vascular service is considering right BKA  UTI (urinary tract infection)  Assessment & Plan  UA positive, but likely contaminant rather than an actual urinary tract infection  · Appreciate ID recommendations  Monitor off antibiotics  Thrombocytopenia ([facility] )  Assessment & Plan  Possibly due to HIT   Resolved  · Awaiting heparin antibody by serotonin release (performed 11/11)  · Heparin induced antibody elevated    Recent Labs     11/22/22  0542 11/23/22  0504 11/24/22  0527   * 418* 538*         Diabetic ulcer of heel associated with diabetes mellitus due to underlying condition [facility])  Assessment & Plan  S/p left aka 11/23/2022   - Post-op wound management per vascular surgery  - Await next step in management from vascular surgery, currently planned for possible right BKA  TBD  Acute on chronic anemia  Assessment & Plan  S/p 4U pRBC  Will monitor for post-op bleeding  Hemoglobin of 4 8, likely erroneous given repeat bloodwork coming back normal  No evidence of bleeding at this time  Recent Labs     11/24/22  0527 11/24/22  0813   HGB 4 8* 8 8*         Hypokalemia  Assessment & Plan  Likely due to GI losses  Resolved  · Continue repleting PRN    Recent Labs     11/22/22  0542 11/23/22  0504 11/24/22  0527   K 3 5 3 3* 3 6   MG 1 7 1 6  --          Type 2 diabetes mellitus with hyperglycemia, with long-term current use of insulin [facility])  Assessment & Plan  Lab Results   Component Value Date    HGBA1C 9 8 (H) 10/25/2022     Recent Labs     11/23/22  1104 11/23/22  1634 11/23/22  2030 11/24/22  0721   POCGLU 121 103 95 134     · Inpatient regimen: Sliding scale, Glargine 15U HS    Benign essential hypertension  Assessment & Plan  Above goal  · Continue amlodipine, Cardura    Depression, recurrent (HCC)  Assessment & Plan  Evaluated by Psychiatry on this admission  · Continue Zoloft 150 mg daily / Quetiapine 25mg po Qhs    Acute respiratory failure with hypoxia ([address] )  Assessment & Plan  Echo on 11/07 shows 18% LVEF, no diastolic dysfunction  Postprocedural hypoxia  · Currently on 1L NC      VTE Pharmacologic Prophylaxis:   Pharmacologic: Argatroban  Mechanical VTE Prophylaxis in Place: Yes    Discussions with Specialists or Other Care Team Provider: nursing    Education and Discussions with Family / Patient: patient    Time Spent for Care: 30 minutes  More than 50% of total time spent on counseling and coordination of care as described above  Current Length of Stay: 33 day(s)    Current Patient Status: Inpatient   Certification Statement:  The patient will continue to require additional inpatient hospital stay due to pain control, vascular surgery reeval, anemia monitoring    Discharge Plan: active    Code Status: Level 1 - Full Code      Subjective:   Patient seen and examined at bedside  Reports that her pain is currently controlled  No new issues overnight  Objective:     Vitals:   Temp (24hrs), Av 1 °F (36 7 °C), Min:97 7 °F (36 5 °C), Max:98 4 °F (36 9 °C)    Temp:  [97 7 °F (36 5 °C)-98 4 °F (36 9 °C)] 97 8 °F (36 6 °C)  HR:  [71-94] 86  Resp:  [16-20] 18  BP: (110-176)/(50-82) 162/70  SpO2:  [91 %-97 %] 96 %  Body mass index is 35 95 kg/m²  Input and Output Summary (last 24 hours): Intake/Output Summary (Last 24 hours) at 2022 1141  Last data filed at 2022 1004  Gross per 24 hour   Intake 657 94 ml   Output 1812 ml   Net -1154 06 ml       Physical Exam:     Physical Exam  Vitals reviewed  Constitutional:       General: She is not in acute distress  HENT:      Head: Normocephalic  Nose: Nose normal       Mouth/Throat:      Mouth: Mucous membranes are moist    Eyes:      General: No scleral icterus  Cardiovascular:      Rate and Rhythm: Normal rate  Pulmonary:      Effort: Pulmonary effort is normal  No respiratory distress  Abdominal:      General: There is no distension  Palpations: Abdomen is soft  Tenderness: There is no abdominal tenderness  Musculoskeletal:      Comments: Left AKA stump tender, right lower extremity with wounds  Dressing in place   Skin:     General: Skin is warm  Neurological:      Mental Status: She is alert  Mental status is at baseline     Psychiatric:         Mood and Affect: Mood normal          Behavior: Behavior normal        Additional Data:     Labs:    Results from last 7 days   Lab Units 22  0813 22  0527 22  0504   WBC Thousand/uL  --   50* 13 09*   HEMOGLOBIN g/dL 8 8* 4 8* 8 7*   HEMATOCRIT % 27 9* 15 1* 27 7*   PLATELETS Thousands/uL  -- 538* 418*   NEUTROS PCT %  --   --  82*   LYMPHS PCT %  --   --  9*   LYMPHO PCT %  --  12*  --    MONOS PCT %  --   --  6   MONO PCT %  --  0*  --    EOS PCT %  --  0 2     Results from last 7 days   Lab Units 11/24/22  0527   SODIUM mmol/L 141   POTASSIUM mmol/L 3 6   CHLORIDE mmol/L 102   CO2 mmol/L 29   BUN mg/dL 9   CREATININE mg/dL 0 71   ANION GAP mmol/L 10   CALCIUM mg/dL 7 9*   GLUCOSE RANDOM mg/dL 141*     Results from last 7 days   Lab Units 11/24/22  0527   INR  2 40*     Results from last 7 days   Lab Units 11/24/22  1140 11/24/22  0721 11/23/22  2030 11/23/22  1634 11/23/22  1104 11/23/22  0954 11/22/22  2051 11/22/22  1538 11/22/22  1142 11/22/22  0735 11/21/22  2056 11/21/22  1600   POC GLUCOSE mg/dl 117 134 95 103 121 119 116 134 167* 141* 153* 156*                   * I Have Reviewed All Lab Data Listed Above  * Additional Pertinent Lab Tests Reviewed: [address] Admission Reviewed      Lines:  Invasive Devices     Peripherally Inserted Central Catheter Line  Duration           PICC Line 43/48/01 Right Basilic 5 days          Peripheral Intravenous Line  Duration           Peripheral IV 11/23/22 Dorsal (posterior); Left;Proximal Forearm <1 day          Drain  Duration           Urethral Catheter Non-latex 18 Fr  7 days                 Imaging:    Imaging Reports Reviewed Today Include: No new imaging    Recent Cultures (last 7 days):           Last 24 Hours Medication List:   Current Facility-Administered Medications   Medication Dose Route Frequency Provider Last Rate   • acetaminophen  650 mg Oral Q6H [facility] Joyce Rios MD     • ALPRAZolam  0 25 mg Oral Daily PRN Joyce Rios MD     • amLODIPine  10 mg Oral Daily Joyce Rios MD     • argatroban  0 1-3 mcg/kg/min Intravenous Titrated Joyce Rios MD 0 3 mcg/kg/min (11/24/22 1124)   • atorvastatin  40 mg Oral Daily With Lee Gomez MD     • clopidogrel  75 mg Oral Daily Joyce Rios MD     • doxazosin  2 mg Oral Daily Anthony Smith MD     • gabapentin  200 mg Oral BID Anthony Smith MD     • HYDROmorphone   Intravenous Continuous Anthony Smith MD     • HYDROmorphone  0 5 mg Intravenous Q3H PRN Anthony Smith MD     • insulin glargine  15 Units Subcutaneous HS Anthony Smith MD     • insulin lispro  1-6 Units Subcutaneous TID [facility] Anthony Smith MD     • metoclopramide  10 mg Intravenous Q6H PRN Anthony Smith MD     • naloxone  0 04 mg Intravenous Q1MIN PRN Anthony Smith MD     • naloxone  0 04 mg Intravenous Q3 min PRN Anthony Smith MD     • nystatin   Topical BID Anthony Smith MD     • ondansetron  4 mg Intravenous Q6H PRN Anthony Smith MD     • oxyCODONE  5 mg Oral Q4H PRN Anthony Smith MD     • pantoprazole  40 mg Oral Early Morning Anthony Smith MD     • polyethylene glycol  17 g Oral Daily PRN Anthony Smith MD     • QUEtiapine  25 mg Oral HS Anthony Smith MD     • senna  1 tablet Oral HS PRN Anthony Smith MD     • sertraline  150 mg Oral Daily Anthony Smith MD     • sodium chloride  20 mL/hr Intravenous Continuous Anthony Smith MD 20 mL/hr (11/23/22 1638)        Today, Patient Was Seen By: Herberth Dawson MD    ** Please Note: Dictation voice to text software may have been used in the creation of this document   **

## 2022-11-25 LAB
ANION GAP SERPL CALCULATED.3IONS-SCNC: 7 MMOL/L (ref 4–13)
APTT PPP: 75 SECONDS (ref 23–37)
APTT PPP: 87 SECONDS (ref 23–37)
APTT PPP: 91 SECONDS (ref 23–37)
APTT PPP: 99 SECONDS (ref 23–37)
BASOPHILS # BLD AUTO: 0.05 THOUSANDS/ÂΜL (ref 0–0.1)
BASOPHILS NFR BLD AUTO: 0 % (ref 0–1)
BUN SERPL-MCNC: 10 MG/DL (ref 5–25)
CALCIUM SERPL-MCNC: 7.5 MG/DL (ref 8.3–10.1)
CHLORIDE SERPL-SCNC: 103 MMOL/L (ref 96–108)
CO2 SERPL-SCNC: 30 MMOL/L (ref 21–32)
CREAT SERPL-MCNC: 0.8 MG/DL (ref 0.6–1.3)
EOSINOPHIL # BLD AUTO: 0.14 THOUSAND/ÂΜL (ref 0–0.61)
EOSINOPHIL NFR BLD AUTO: 1 % (ref 0–6)
ERYTHROCYTE [DISTWIDTH] IN BLOOD BY AUTOMATED COUNT: 15 % (ref 11.6–15.1)
GFR SERPL CREATININE-BSD FRML MDRD: 79 ML/MIN/1.73SQ M
GLUCOSE SERPL-MCNC: 121 MG/DL (ref 65–140)
GLUCOSE SERPL-MCNC: 125 MG/DL (ref 65–140)
GLUCOSE SERPL-MCNC: 159 MG/DL (ref 65–140)
GLUCOSE SERPL-MCNC: 166 MG/DL (ref 65–140)
GLUCOSE SERPL-MCNC: 166 MG/DL (ref 65–140)
HCT VFR BLD AUTO: 24.7 % (ref 34.8–46.1)
HCT VFR BLD AUTO: 25.5 % (ref 34.8–46.1)
HGB BLD-MCNC: 7.4 G/DL (ref 11.5–15.4)
HGB BLD-MCNC: 7.9 G/DL (ref 11.5–15.4)
IMM GRANULOCYTES # BLD AUTO: 0.1 THOUSAND/UL (ref 0–0.2)
IMM GRANULOCYTES NFR BLD AUTO: 1 % (ref 0–2)
LYMPHOCYTES # BLD AUTO: 0.63 THOUSANDS/ÂΜL (ref 0.6–4.47)
LYMPHOCYTES NFR BLD AUTO: 5 % (ref 14–44)
MAGNESIUM SERPL-MCNC: 1.5 MG/DL (ref 1.6–2.6)
MCH RBC QN AUTO: 26.5 PG (ref 26.8–34.3)
MCHC RBC AUTO-ENTMCNC: 30 G/DL (ref 31.4–37.4)
MCV RBC AUTO: 89 FL (ref 82–98)
MONOCYTES # BLD AUTO: 0.64 THOUSAND/ÂΜL (ref 0.17–1.22)
MONOCYTES NFR BLD AUTO: 5 % (ref 4–12)
NEUTROPHILS # BLD AUTO: 10.44 THOUSANDS/ÂΜL (ref 1.85–7.62)
NEUTS SEG NFR BLD AUTO: 88 % (ref 43–75)
NRBC BLD AUTO-RTO: 0 /100 WBCS
PLATELET # BLD AUTO: 364 THOUSANDS/UL (ref 149–390)
PMV BLD AUTO: 8 FL (ref 8.9–12.7)
POTASSIUM SERPL-SCNC: 3.6 MMOL/L (ref 3.5–5.3)
RBC # BLD AUTO: 2.79 MILLION/UL (ref 3.81–5.12)
SODIUM SERPL-SCNC: 140 MMOL/L (ref 135–147)
WBC # BLD AUTO: 12 THOUSAND/UL (ref 4.31–10.16)

## 2022-11-25 PROCEDURE — 80048 BASIC METABOLIC PNL TOTAL CA: CPT | Performed by: STUDENT IN AN ORGANIZED HEALTH CARE EDUCATION/TRAINING PROGRAM

## 2022-11-25 PROCEDURE — 85730 THROMBOPLASTIN TIME PARTIAL: CPT | Performed by: STUDENT IN AN ORGANIZED HEALTH CARE EDUCATION/TRAINING PROGRAM

## 2022-11-25 PROCEDURE — 99024 POSTOP FOLLOW-UP VISIT: CPT | Performed by: SURGERY

## 2022-11-25 PROCEDURE — 97530 THERAPEUTIC ACTIVITIES: CPT

## 2022-11-25 PROCEDURE — 97530 THERAPEUTIC ACTIVITIES: CPT | Performed by: PHYSICAL THERAPIST

## 2022-11-25 PROCEDURE — 82948 REAGENT STRIP/BLOOD GLUCOSE: CPT

## 2022-11-25 PROCEDURE — 99232 SBSQ HOSP IP/OBS MODERATE 35: CPT | Performed by: STUDENT IN AN ORGANIZED HEALTH CARE EDUCATION/TRAINING PROGRAM

## 2022-11-25 PROCEDURE — 85025 COMPLETE CBC W/AUTO DIFF WBC: CPT | Performed by: STUDENT IN AN ORGANIZED HEALTH CARE EDUCATION/TRAINING PROGRAM

## 2022-11-25 PROCEDURE — 85014 HEMATOCRIT: CPT | Performed by: STUDENT IN AN ORGANIZED HEALTH CARE EDUCATION/TRAINING PROGRAM

## 2022-11-25 PROCEDURE — 97110 THERAPEUTIC EXERCISES: CPT

## 2022-11-25 PROCEDURE — 83735 ASSAY OF MAGNESIUM: CPT | Performed by: STUDENT IN AN ORGANIZED HEALTH CARE EDUCATION/TRAINING PROGRAM

## 2022-11-25 PROCEDURE — 85018 HEMOGLOBIN: CPT | Performed by: STUDENT IN AN ORGANIZED HEALTH CARE EDUCATION/TRAINING PROGRAM

## 2022-11-25 RX ORDER — POLYETHYLENE GLYCOL 3350 17 G/17G
17 POWDER, FOR SOLUTION ORAL DAILY
Status: DISCONTINUED | OUTPATIENT
Start: 2022-11-25 | End: 2022-11-26

## 2022-11-25 RX ORDER — MAGNESIUM SULFATE HEPTAHYDRATE 40 MG/ML
2 INJECTION, SOLUTION INTRAVENOUS ONCE
Status: COMPLETED | OUTPATIENT
Start: 2022-11-25 | End: 2022-11-25

## 2022-11-25 RX ADMIN — INSULIN LISPRO 1 UNITS: 100 INJECTION, SOLUTION INTRAVENOUS; SUBCUTANEOUS at 09:38

## 2022-11-25 RX ADMIN — SERTRALINE HYDROCHLORIDE 150 MG: 100 TABLET ORAL at 09:27

## 2022-11-25 RX ADMIN — ACETAMINOPHEN 650 MG: 325 TABLET, FILM COATED ORAL at 00:07

## 2022-11-25 RX ADMIN — NYSTATIN: 100000 POWDER TOPICAL at 17:18

## 2022-11-25 RX ADMIN — QUETIAPINE FUMARATE 25 MG: 25 TABLET ORAL at 21:55

## 2022-11-25 RX ADMIN — MAGNESIUM SULFATE HEPTAHYDRATE 2 G: 40 INJECTION, SOLUTION INTRAVENOUS at 09:38

## 2022-11-25 RX ADMIN — ACETAMINOPHEN 650 MG: 325 TABLET, FILM COATED ORAL at 06:16

## 2022-11-25 RX ADMIN — ACETAMINOPHEN 650 MG: 325 TABLET, FILM COATED ORAL at 12:15

## 2022-11-25 RX ADMIN — SODIUM CHLORIDE 20 ML/HR: 0.9 INJECTION, SOLUTION INTRAVENOUS at 14:16

## 2022-11-25 RX ADMIN — INSULIN GLARGINE 15 UNITS: 100 INJECTION, SOLUTION SUBCUTANEOUS at 21:54

## 2022-11-25 RX ADMIN — GABAPENTIN 200 MG: 100 CAPSULE ORAL at 09:27

## 2022-11-25 RX ADMIN — DOXAZOSIN 2 MG: 2 TABLET ORAL at 09:27

## 2022-11-25 RX ADMIN — CLOPIDOGREL BISULFATE 75 MG: 75 TABLET ORAL at 09:27

## 2022-11-25 RX ADMIN — POLYETHYLENE GLYCOL 3350 17 G: 17 POWDER, FOR SOLUTION ORAL at 09:38

## 2022-11-25 RX ADMIN — ACETAMINOPHEN 650 MG: 325 TABLET, FILM COATED ORAL at 17:18

## 2022-11-25 RX ADMIN — GABAPENTIN 200 MG: 100 CAPSULE ORAL at 17:18

## 2022-11-25 RX ADMIN — SENNOSIDES 8.6 MG: 8.6 TABLET, FILM COATED ORAL at 09:27

## 2022-11-25 RX ADMIN — ATORVASTATIN CALCIUM 40 MG: 40 TABLET, FILM COATED ORAL at 17:18

## 2022-11-25 RX ADMIN — NYSTATIN: 100000 POWDER TOPICAL at 09:30

## 2022-11-25 RX ADMIN — AMLODIPINE BESYLATE 10 MG: 10 TABLET ORAL at 09:27

## 2022-11-25 RX ADMIN — PANTOPRAZOLE SODIUM 40 MG: 40 TABLET, DELAYED RELEASE ORAL at 06:16

## 2022-11-25 RX ADMIN — SENNOSIDES 8.6 MG: 8.6 TABLET, FILM COATED ORAL at 17:18

## 2022-11-25 NOTE — ASSESSMENT & PLAN NOTE
S/p 4U pRBC  Will monitor for post-op bleeding  Hemoglobin of 4 8, likely erroneous given repeat bloodwork coming back normal  Will order a repeat hemoglobin later this afternoon given the drop of hemoglobin overnight      Recent Labs     11/23/22  0504 11/24/22  0527 11/24/22  0813 11/25/22  0654   HGB 8 7* 4 8* 8 8* 7 4*

## 2022-11-25 NOTE — ASSESSMENT & PLAN NOTE
UA positive, but likely contaminant rather than an actual urinary tract infection  Fever spike overnight, but no shortness of breath or dysuria, possibly post-op fever  · Appreciate ID recommendations  Monitor off antibiotics  Will reconsult ID if she spikes again overnight

## 2022-11-25 NOTE — ASSESSMENT & PLAN NOTE
Possibly due to HIT  Resolved  Had reactive thrombocytosis, but also resolved today 11/25/2022    · Awaiting heparin antibody by serotonin release (performed 11/11)  · Heparin induced antibody elevated    Recent Labs     11/23/22  0504 11/24/22  0527 11/25/22  0654   * 538* 364

## 2022-11-25 NOTE — ASSESSMENT & PLAN NOTE
Likely due to GI losses   Resolved  · Continue repleting PRN    Recent Labs     11/23/22  0504 11/24/22  0527 11/25/22  0654   K 3 3* 3 6 3 6   MG 1 6  --  1 5*

## 2022-11-25 NOTE — ASSESSMENT & PLAN NOTE
PAD with multiple bilateral lower extremity endovascular interventions, c/b atheroembolic event to the RLE and JUSTIN with R hemispheric CVA  In the setting of, Uncontrolled diabetes, hypertension, hyperlipidemia  Lost to follow-up and poorly compliant to diabetic regimen  · Interventions: S/p abdominal aortogram, LLE SFA shockwave angioplasty/FATOU 10/31/22  S/p R femoral endarterectomy 11/7  S/p aspiration thrombectomy in right common iliac artery and left SFA  · Continue statin / Plavix  · S/p Left AKA 11/23/2022  · Maintain Argatroban (Not on a heparin drip due to concern for thrombocytopenia / HIT) for now since vascular service is considering right BKA sometime next week

## 2022-11-25 NOTE — OCCUPATIONAL THERAPY NOTE
Occupational Therapy Progress Note     Patient Name: Hannah Steiner  DXUMD'Y Date: 11/25/2022  Problem List  Principal Problem:    Stroke [facility])  Active Problems:    Acute respiratory failure with hypoxia ([facility] )    Depression, recurrent ([facility] )    Benign essential hypertension    Type 2 diabetes mellitus with hyperglycemia, with long-term current use of insulin (HCC)    Hypokalemia    PAD (peripheral artery disease) ([facility])    Acute on chronic anemia    Diabetic ulcer of heel associated with diabetes mellitus due to underlying condition ([facility])    Thrombocytopenia ([facility])    UTI (urinary tract infection)    Diarrhea            11/25/22 1526   OT Last Visit   OT Visit Date 11/25/22   Note Type   Note Type Treatment   Pain Assessment   Pain Assessment Tool FLACC   Pain Location/Orientation Orientation: Left;Orientation: Right;Location: Leg  (phantom pain L LE)   [facility] Pain Intervention(s) Ambulation/increased activity;Repositioned; Environmental changes   Pain Rating: FLACC (Rest) - Face 0   Pain Rating: FLACC (Rest) - Legs 0   Pain Rating: FLACC (Rest) - Activity 1   Pain Rating: FLACC (Rest) - Cry 0   Pain Rating: FLACC (Rest) - Consolability 0   Score: FLACC (Rest) 1   Pain Rating: FLACC (Activity) - Face 1   Pain Rating: FLACC (Activity) - Legs 1   Pain Rating: FLACC (Activity) - Activity 2   Pain Rating: FLACC (Activity) - Cry 1   Pain Rating: FLACC (Activity) - Consolability 1   Score: FLACC (Activity) 6   Restrictions/Precautions   Weight Bearing Precautions Per Order Yes   RLE Weight Bearing Per Order WBAT   LLE Weight Bearing Per Order   (AKA)   Braces or Orthoses   (surgical shoe R LE)   Other Precautions Cognitive; Bed Alarm; Chair Alarm; Fall Risk;Pain;WBS   ADL   Where Assessed   (upright bed)   Eating Assistance 4  Minimal Assistance   Eating Deficit Setup;Verbal cueing; Increased time to complete;Supervision/safety;Scoop assist   Grooming Assistance 4  Minimal Assistance   Grooming Deficit Setup;Verbal cueing;Supervision/safety; Increased time to complete;Wash/dry hands; Wash/dry face   Bed Mobility   Rolling R 2  Maximal assistance   Additional items Assist x 2; Increased time required;Verbal cues;LE management; Bedrails   Rolling L 2  Maximal assistance   Additional items Assist x 2; Increased time required;Verbal cues;LE management; Bedrails   Supine to Sit 2  Maximal assistance   Additional items Assist x 2; Increased time required;Verbal cues;LE management; Bedrails;HOB elevated   Sit to Supine 2  Maximal assistance   Additional items Assist x 2; Increased time required;Verbal cues;LE management; Bedrails;HOB elevated   Additional Comments MAX assist x2 to EOB w/ increased pain and requesting to return supine after 15 sec tolerance, increased pain in R LE, MAX assist x2 long sitting in bed and repositioning pillow   Transfers   Sit to Stand Unable to assess   Additional Comments andree lift for OOB   Therapeutic Exercise - ROM   UE-ROM Yes   ROM- Right Upper Extremities   R Shoulder AROM; Flexion; Extension;Horizontal ABduction   R Elbow AROM;Elbow flexion;Elbow extension  (forearm pronation/supination)   R Weight/Reps/Sets 2 sets x 10 reps   RUE ROM Comment tolerated well w/ cues for correct positioning   ROM - Left Upper Extremities    L Shoulder AROM; Flexion; Extension;Horizontal ABduction   L Elbow AROM;Elbow extension;Elbow flexion  (forearm pronation/supination)   L Weight/Reps/Sets 2 sets x 10 reps   LUE ROM Comment tolerated well w/ cues for correct positioning, rest breaks needed   Subjective   Subjective "I made it downstairs myself yesterday"   Cognition   Overall Cognitive Status Impaired   Arousal/Participation Responsive; Cooperative   Attention Attends with cues to redirect   Orientation Level Oriented to person;Oriented to place  (day after thanksgiving for date)   Memory Decreased short term memory;Decreased recall of recent events;Decreased recall of precautions   Following Commands Follows one step commands with increased time or repetition   Comments decreased insight, increased confusion since surgery   Additional Activities   Additional Activities   (education on positioning)   Additional Activities Comments pt receptive   Activity Tolerance   Activity Tolerance Patient limited by pain; Patient limited by fatigue   Medical Staff Made Aware appropriate to see per Clara HERNÁNDEZ   Assessment   Assessment Pt seen for skilled OT session focused on ADLs, functional bed mobility, UE exercises  Pt w/ L AKA on 11/23 and now w/ wound vac and reports having some phantom pain and pain R LE  Pt completed b/l UE exercises seen above to increased strength and endurance for ADLS, functional transfers and mobility; pt w/ cues for correct techniques  Pt w/ MAX assist x2 supine>sit bed mobility and pt w/ increased pain requesting to return to bed  Pt MAX assist x2 repositioning in bed and green foam wedges offloading b/l sides and pillow offloading R LE  Pt w/ increased confusion since surgery and reorientation provided  Pt setup to wash face  Pt positioned upright w/ all needs met end of session and alarm intact  Pt continues to be limited due to increased pain, impaired balance, decreased strength and endurance, multiple lines, impaired skin integrity, impaired cognition all causing a decline in ADLs, functional bed mobility  recommend STR when medically stable  Will continue to follow  The patient's raw score on the AM-PAC Daily Activity inpatient short form is 12, standardized score is 30 6, less than 39 4  Patients at this level are likely to benefit from discharge to post-acute rehabilitation services  Please refer to the recommendation of the Occupational Therapist for safe discharge planning  Plan   Treatment Interventions ADL retraining;UE strengthening/ROM; Functional transfer training; Endurance training;Cognitive reorientation;Equipment evaluation/education;Patient/family training; Compensatory technique education; Energy conservation; Activityengagement   Goal Expiration Date 11/29/22   OT Treatment Day 9   OT Frequency 3-5x/wk   Recommendation   OT Discharge Recommendation Post acute rehabilitation services   AM-PAC Daily Activity Inpatient   Lower Body Dressing 1   Bathing 2   Toileting 1   Upper Body Dressing 2   Grooming 3   Eating 3   Daily Activity Raw Score 12   Daily Activity Standardized Score (Calc for Raw Score >=11) 30 6   AM-PAC Applied Cognition Inpatient   Following a Speech/Presentation 3   Understanding Ordinary Conversation 3   Taking Medications 3   Remembering Where Things Are Placed or Put Away 3   Remembering List of 4-5 Errands 2   Taking Care of Complicated Tasks 2   Applied Cognition Raw Score 16   Applied Cognition Standardized Score 35 03   Modified Piatt Scale   Modified Sheryr Scale 5   End of Consult   Education Provided Yes   Patient Position at End of Consult Supine;Bed/Chair alarm activated; All needs within reach     Documentation completed by: Valencia Gibson MS, OTR/L

## 2022-11-25 NOTE — PLAN OF CARE
Problem: INFECTION - ADULT  Goal: Absence or prevention of progression during hospitalization  Description: INTERVENTIONS:  - Assess and monitor for signs and symptoms of infection  - Monitor lab/diagnostic results  - Monitor all insertion sites, i e  indwelling lines, tubes, and drains  - Administer medications as ordered  - Instruct and encourage patient and family to use good hand hygiene technique  Outcome: Progressing     Problem: DISCHARGE PLANNING  Goal: Discharge to home or other facility with appropriate resources  Description: INTERVENTIONS:  - Identify barriers to discharge w/patient   - Arrange for needed discharge resources and transportation as appropriate  - Identify discharge learning needs  - Refer to Case Management Department for coordinating discharge planning if the patient needs post-hospital services based on physician/advanced practitioner order   Outcome: Progressing

## 2022-11-25 NOTE — PHYSICAL THERAPY NOTE
PT PROGRESS NOTE    Name: Victoria Monzon  AGE: 65 y o  MRN: [id number]  LENGTH OF STAY: 34          11/25/22 1530   PT Last Visit   PT Visit Date 11/25/22   Note Type   Note Type Treatment   Pain Assessment   Pain Assessment Tool FLACC   Pain Location/Orientation Orientation: Bilateral;Location: Leg   Hospital Pain Intervention(s) Repositioned; Ambulation/increased activity; Elevated; Emotional support; Rest   Pain Rating: FLACC (Rest) - Face 0   Pain Rating: FLACC (Rest) - Legs 0   Pain Rating: FLACC (Rest) - Activity 1   Pain Rating: FLACC (Rest) - Cry 0   Pain Rating: FLACC (Rest) - Consolability 0   Score: FLACC (Rest) 1   Pain Rating: FLACC (Activity) - Face 1   Pain Rating: FLACC (Activity) - Legs 1   Pain Rating: FLACC (Activity) - Activity 2   Pain Rating: FLACC (Activity) - Cry 1   Pain Rating: FLACC (Activity) - Consolability 1   Score: FLACC (Activity) 6   Restrictions/Precautions   Weight Bearing Precautions Per Order Yes   RLE Weight Bearing Per Order WBAT   LLE Weight Bearing Per Order   (AKA)   Braces or Orthoses Other (Comment)  (Surgical shoe RLE)   Other Precautions Cognitive; Chair Alarm; Bed Alarm;Multiple lines;O2;Fall Risk;Pain;WBS  (3 L O2 NC)   General   Chart Reviewed Yes   Family/Caregiver Present No   Cognition   Overall Cognitive Status Impaired   Attention Attends with cues to redirect   Orientation Level Oriented to person;Oriented to place   Following Commands Follows one step commands with increased time or repetition   Subjective   Subjective I want to try to sit at EOB   Bed Mobility   Rolling R 2  Maximal assistance   Additional items Assist x 2;Bedrails; Increased time required;Verbal cues;LE management   Rolling L 2  Maximal assistance   Additional items Assist x 2;Bedrails; Increased time required;Verbal cues;LE management   Supine to Sit 2  Maximal assistance   Additional items Assist x 2;Bedrails; Increased time required;Verbal cues;LE management;HOB elevated   Sit to Supine 2 Maximal assistance   Additional items Assist x 2;HOB elevated; Bedrails; Increased time required;Verbal cues;LE management   Additional Comments Pt unable to tolerate full supine to sit transition 2* inc pain with LE management; inc resistance from pt with inc assistance provided to perform transition   Transfers   Sit to Stand Unable to assess   Additional Comments andree lift for OOB   Ambulation/Elevation   Gait pattern Not appropriate   Ambulation/Elevation Additional Comments recommend andree lift   Endurance Deficit   Endurance Deficit Yes   Endurance Deficit Description fatigue and pain   Activity Tolerance   Activity Tolerance Patient limited by pain;Treatment limited secondary to medical complications (Comment)   Medical Staff Made Aware OT Belkys   Nurse Made Aware EDGAR Lagunas   Assessment   Prognosis Guarded   Problem List Decreased strength;Decreased range of motion;Decreased endurance; Impaired balance;Decreased mobility; Decreased cognition; Impaired judgement;Decreased safety awareness; Obesity; Decreased skin integrity;Pain   Assessment Patient was seen today per POC  Pt on 3L O2 NC  Overall, dec tolerance to activity  Inc pain in RLE and experiencing phantom limb pain in LLE  Wound vac present on LLE s/p L AKA  Pt motivated to attempt supine to sit transfer at EOB  MaxAx2 for rolling L and R and supine<>sit transfer  Pt unable to achieve full supine to sit transfer 2* significant pain in bilateral LE with activity  Inc resistance from pt when attempting to sit at EOB  Pt unable to manage LE throughout and depended on therapist for assistance  Repositioned pt with use of green foam wedges on L and R side to allow proper offloading  Elevated RLE onto pillow with improved tolerance  Unable to tolerate therapeutic interventions this session  No reports of dizziness t/o session  Will continue to see pt per POC as tolerated  The patient's AM-PAC Basic Mobility Inpatient Short Form Raw Score is 6   A Raw score of less than or equal to 16 suggests the patient may benefit from discharge to post-acute rehabilitation services  Please also refer to the recommendation of the Physical Therapist for safe discharge planning  From PT standpoint continued recommendation for STR at D/C when medically cleared based current function  Recommend andree lift for OOB  Nsg staff notified  Goals   Patient Goals to get better   PT Treatment Day 7   Plan   Treatment/Interventions Functional transfer training;LE strengthening/ROM; Therapeutic exercise; Endurance training;Patient/family training;Bed mobility;Spoke to nursing;OT   Progress Slow progress, decreased activity tolerance   PT Frequency 3-5x/wk   Recommendation   PT Discharge Recommendation Post acute rehabilitation services   Equipment Recommended   (andree lift)   AM-PAC Basic Mobility Inpatient   Turning in Bed Without Bedrails 1   Lying on Back to Sitting on Edge of Flat Bed 1   Moving Bed to Chair 1   Standing Up From Chair 1   Walk in Room 1   Climb 3-5 Stairs 1   Basic Mobility Inpatient Raw Score 6   Highest Level Of Mobility   JH-HLM Goal 2: Bed activities/Dependent transfer   JH-HLM Achieved 2: Bed activities/Dependent transfer   Education   Education Provided   (Educated on importance of functional mobility)   Patient Reinforcement needed   End of Consult   Patient Position at End of Consult Supine;Bed/Chair alarm activated; All needs within reach   End of Consult Comments Pt in stable condition at end of session  RN notified         Henny Sheffield, PT

## 2022-11-25 NOTE — PROGRESS NOTES
PTT drawn a little after 1400 this afternoon  When PTT hadn't populated, lab was contacted  Hernandez Andrade stated the tube station for the lab was down  Vials were found returned to the tube station on [place] [address] at 1620  Nurse walked vials to lab  Gtt adjusted

## 2022-11-25 NOTE — ASSESSMENT & PLAN NOTE
65year old female is currently admitted due to a moderate to large volume left MCA territory stroke  · Repeat CT head showed an evolving acute infarct in right parietal-temporal-occipital lobes with questionable petechial cortical hemorrhage in right parietal lobe  · Discussed the findings above with Dr Gerhardt Hale  He recommended that we continue anticoagulation for now due to the clinical insignificance of the size and the risk of taking her off anticoagulation far outweighs the benefits off of it, not unless new contraindications arise    · Continue argatroban until all surgical interventions are completed

## 2022-11-25 NOTE — PLAN OF CARE
Problem: Potential for Falls  Goal: Patient will remain free of falls  Description: INTERVENTIONS:  - Educate patient/family on patient safety including physical limitations  - Instruct patient to call for assistance with activity   - Consult OT/PT to assist with strengthening/mobility   - Keep Call bell within reach  - Keep bed low and locked with side rails adjusted as appropriate  - Keep care items and personal belongings within reach  - Initiate and maintain comfort rounds  - Make Fall Risk Sign visible to staff  - Offer Toileting every 2 Hours, in advance of need  - Initiate/Maintain bed  chair alarm  - Obtain necessary fall risk management equipment: bed chair alarm, call bell, gripper sock, walker, bedside commode  - Apply yellow socks and bracelet for high fall risk patients  - Consider moving patient to room near nurses station  Outcome: Progressing     Problem: PAIN - ADULT  Goal: Verbalizes/displays adequate comfort level or baseline comfort level  Description: Interventions:  - Encourage patient to monitor pain and request assistance  - Assess pain using appropriate pain scale  - Administer analgesics based on type and severity of pain and evaluate response  - Implement non-pharmacological measures as appropriate and evaluate response  - Consider cultural and social influences on pain and pain management  - Notify physician/advanced practitioner if interventions unsuccessful or patient reports new pain  Outcome: Progressing     Problem: SAFETY ADULT  Goal: Patient will remain free of falls  Description: INTERVENTIONS:  - Educate patient/family on patient safety including physical limitations  - Instruct patient to call for assistance with activity   - Consult OT/PT to assist with strengthening/mobility   - Keep Call bell within reach  - Keep bed low and locked with side rails adjusted as appropriate  - Keep care items and personal belongings within reach  - Initiate and maintain comfort rounds  - Make Fall Risk Sign visible to staff  - Offer Toileting every 2 Hours, in advance of need  - Initiate/Maintain bed  chair alarm  - Obtain necessary fall risk management equipment: bed chair alarm, call bell, gripper sock, walker, bedside commode  - Apply yellow socks and bracelet for high fall risk patients  - Consider moving patient to room near nurses station  Outcome: Progressing     Problem: CARDIOVASCULAR - ADULT  Goal: Maintains optimal cardiac output and hemodynamic stability  Description: INTERVENTIONS:  - Monitor I/O, vital signs and rhythm  - Monitor for S/S and trends of decreased cardiac output  - Administer and titrate ordered vasoactive medications to optimize hemodynamic stability  - Assess quality of pulses, skin color and temperature  - Assess for signs of decreased coronary artery perfusion  - Instruct patient to report change in severity of symptoms  Outcome: Progressing     Problem: RESPIRATORY - ADULT  Goal: Achieves optimal ventilation and oxygenation  Description: INTERVENTIONS:  - Assess for changes in respiratory status  - Assess for changes in mentation and behavior  - Position to facilitate oxygenation and minimize respiratory effort  - Oxygen administered by appropriate delivery if ordered  - Initiate smoking cessation education as indicated  - Encourage broncho-pulmonary hygiene including cough, deep breathe, Incentive Spirometry  - Assess the need for suctioning and aspirate as needed  - Assess and instruct to report SOB or any respiratory difficulty  - Respiratory Therapy support as indicated  Outcome: Progressing

## 2022-11-25 NOTE — PLAN OF CARE
Problem: PHYSICAL THERAPY ADULT  Goal: Performs mobility at highest level of function for planned discharge setting  See evaluation for individualized goals  Description: Treatment/Interventions: Functional transfer training, LE strengthening/ROM, Therapeutic exercise, Endurance training, Patient/family training, Equipment eval/education, Bed mobility, Compensatory technique education, Gait training, Continued evaluation, Spoke to nursing, Spoke to MD, Spoke to case management, OT          See flowsheet documentation for full assessment, interventions and recommendations  Note: Prognosis: Guarded  Problem List: Decreased strength, Decreased range of motion, Decreased endurance, Impaired balance, Decreased mobility, Decreased cognition, Impaired judgement, Decreased safety awareness, Obesity, Decreased skin integrity, Pain  Assessment: Patient was seen today per POC  Pt on 3L O2 NC  Overall, dec tolerance to activity  Inc pain in RLE and experiencing phantom limb pain in LLE  Wound vac present on LLE s/p L AKA  Pt motivated to attempt supine to sit transfer at EOB  MaxAx2 for rolling L and R and supine<>sit transfer  Pt unable to achieve full supine to sit transfer 2* significant pain in bilateral LE with activity  Inc resistance from pt when attempting to sit at EOB  Pt unable to manage LE throughout and depended on therapist for assistance  Repositioned pt with use of green foam wedges on L and R side to allow proper offloading  Elevated RLE onto pillow with improved tolerance  Unable to tolerate therapeutic interventions this session  No reports of dizziness t/o session  Will continue to see pt per POC as tolerated  The patient's AM-PAC Basic Mobility Inpatient Short Form Raw Score is 6  A Raw score of less than or equal to 16 suggests the patient may benefit from discharge to post-acute rehabilitation services  Please also refer to the recommendation of the Physical Therapist for safe discharge planning  From PT standpoint continued recommendation for STR at D/C when medically cleared based current function  Recommend andree lift for OOB  Nsg staff notified  Barriers to Discharge: Inaccessible home environment  Barriers to Discharge Comments: alone  PT Discharge Recommendation: Post acute rehabilitation services    See flowsheet documentation for full assessment

## 2022-11-25 NOTE — ASSESSMENT & PLAN NOTE
Echo on 11/07 shows 78% LVEF, no diastolic dysfunction   Postprocedural hypoxia  · Currently on 1L NC

## 2022-11-25 NOTE — PROGRESS NOTES
[address] [facility]  Progress Note - [facility] [address] 1960, 65 y o  female MRN: [id number]  Unit/Bed#: E4 -01 Encounter: [id number]  Primary Care Provider: ALEX Caceres   Date and time admitted to hospital: 10/21/2022  7:58 PM    PAD (peripheral artery disease) [facility])  Assessment & Plan  65year old female former smoker w/HTN, HLD, uncontrolled type II DM (A1c 9 8), hx CVA, presenting with nonhealing extensive L heel ulceration and R hallux and 5th digit ulceration  Vascular surgery consulted for input  S/p multiple b/l endovascular interventions  JUSTIN  R hemispheric CVA    S/p L AKA 11/23/22 [facility])    Recommendations:  - Bilateral tissue loss in the setting of uncontrolled type II DM and NYDIA on admission, now s/p multiple angiograms w/intervention    - Endovascular interventions, c/b atheroembolic event to the RLE and JUSTIN with R hemispheric CVA  - Now s/p L AKA on 11/23/22- [employer] in place, to be discontinued on 11/30/22  - Will continue to observe RLE to allow poor perfused superficial tissue loss to demarcate/slough to allow us to determine level of necessary RLE amputation as long as patient remains stable and not septic  There appears to be improvement in skin integrity   - Medical management with statin, Plavix  - Medical management and glucose control per SLIM  Continue to trend Hgb  - If patient becomes septic or systemically ill (presumed to be secondary to RLE), would consider guillotine amputation of the RLE in the emergent setting  However will try to avoid this as long as possible  - Cont argatroban drip in setting of JUSTIN  - Continue PCA pain management  - Will d/w Dr Thakur Chimera: Patient seen and examined  Remains stable  [employer] in place  PCA pump for pain control  Patient offers no complaints other than pain in the LLE       Vitals:  /61 (BP Location: Left arm)   Pulse 94   Temp (!) 100 8 °F (38 2 °C) (Oral)   Resp 18   Ht 5' 4" (1 626 m)   Wt 90 4 kg (199 lb 4 7 oz)   SpO2 97%   BMI 34 21 kg/m²     I/Os:  I/O last 3 completed shifts: In: 660 9 [P O :480; I V :180 9]  Out: 1512 [Urine:1500; Blood:12]  No intake/output data recorded  Lab Results and Cultures:   CBC with diff: Lab Results   Component Value Date    WBC 12 00 (H) 11/25/2022    HGB 7 4 (L) 11/25/2022    HCT 24 7 (L) 11/25/2022    MCV 89 11/25/2022     11/25/2022    MCH 26 5 (L) 11/25/2022    MCHC 30 0 (L) 11/25/2022    RDW 15 0 11/25/2022    MPV 8 0 (L) 11/25/2022    NRBC 0 11/25/2022   ,   BMP/CMP:  Lab Results   Component Value Date    SODIUM 140 11/25/2022    K 3 6 11/25/2022     11/25/2022    CO2 30 11/25/2022    BUN 10 11/25/2022    CREATININE 0 80 11/25/2022    CALCIUM 7 5 (L) 11/25/2022     (H) 11/13/2022    ALT 53 11/13/2022    ALKPHOS 232 (H) 11/13/2022    EGFR 79 11/25/2022   ,   Lipid Panel: No results found for: CHOL,   Coags:   Lab Results   Component Value Date    PTT 99 (H) 11/25/2022    INR 2 40 (H) 11/24/2022   ,     Blood Culture:   Lab Results   Component Value Date    BLOODCX No Growth After 5 Days   11/13/2022    BLOODCX Staphylococcus coagulase negative (A) 11/13/2022   ,   Urinalysis: Lab Results   Component Value Date    COLORU Yellow 11/12/2022    CLARITYU Slightly Cloudy 11/12/2022    SPECGRAV 1 025 11/12/2022    PHUR 5 5 11/12/2022    PHUR 6 5 02/23/2018    LEUKOCYTESUR Trace (A) 11/12/2022    NITRITE Positive (A) 11/12/2022    GLUCOSEU Negative 11/12/2022    KETONESU Negative 11/12/2022    BILIRUBINUR Negative 11/12/2022    BLOODU Large (A) 11/12/2022   ,   Urine Culture:   Lab Results   Component Value Date    URINECX >100,000 cfu/ml Enterobacter cloacae complex (A) 11/12/2022    URINECX 70,000-79,000 cfu/ml Kluyveromyces marxianus (A) 11/12/2022   ,   Wound Culure: No results found for: WOUNDCULT    Medications:  Current Facility-Administered Medications   Medication Dose Route Frequency   • acetaminophen (TYLENOL) tablet 650 mg  650 mg Oral Q6H [address]   • ALPRAZolam (XANAX) tablet 0 25 mg  0 25 mg Oral Daily PRN   • amLODIPine (NORVASC) tablet 10 mg  10 mg Oral Daily   • argatroban infusion (premix)  0 1-3 mcg/kg/min Intravenous Titrated   • atorvastatin (LIPITOR) tablet 40 mg  40 mg Oral Daily With Dinner   • clopidogrel (PLAVIX) tablet 75 mg  75 mg Oral Daily   • doxazosin (CARDURA) tablet 2 mg  2 mg Oral Daily   • gabapentin (NEURONTIN) capsule 200 mg  200 mg Oral BID   • HYDROmorphone (DILAUDID) 1 mg/mL 50 mL PCA   Intravenous Continuous   • HYDROmorphone (DILAUDID) injection 0 5 mg  0 5 mg Intravenous Q3H PRN   • insulin glargine (LANTUS) subcutaneous injection 15 Units 0 15 mL  15 Units Subcutaneous HS   • insulin lispro (HumaLOG) 100 units/mL subcutaneous injection 1-6 Units  1-6 Units Subcutaneous TID AC   • magnesium sulfate 2 g/50 mL IVPB (premix) 2 g  2 g Intravenous Once   • metoclopramide (REGLAN) injection 10 mg  10 mg Intravenous Q6H PRN   • naloxone (NARCAN) 0 04 mg/mL syringe 0 04 mg  0 04 mg Intravenous Q1MIN PRN   • naloxone (NARCAN) 0 04 mg/mL syringe 0 04 mg  0 04 mg Intravenous Q3 min PRN   • nystatin (MYCOSTATIN) powder   Topical BID   • ondansetron (ZOFRAN) injection 4 mg  4 mg Intravenous Q6H PRN   • oxyCODONE (ROXICODONE) IR tablet 5 mg  5 mg Oral Q4H PRN   • pantoprazole (PROTONIX) EC tablet 40 mg  40 mg Oral Early Morning   • polyethylene glycol (MIRALAX) packet 17 g  17 g Oral Daily   • QUEtiapine (SEROquel) tablet 25 mg  25 mg Oral HS   • senna (SENOKOT) tablet 8 6 mg  1 tablet Oral BID   • sertraline (ZOLOFT) tablet 150 mg  150 mg Oral Daily   • sodium chloride 0 9 % infusion  20 mL/hr Intravenous Continuous       Imaging:  Reviewed  Physical Exam:    General: Alert and oriented, in no acute distress  CV: Regular rate   Respiratory: Normal effort   Abdominal: Soft, non-distended   Extremities: RLE with tissue loss to the foot, skin integrity continues to improve   LLE incision with preveena vac in place   Neurologic: Grossly normal         Toy [place], KETURAH  11/25/2022

## 2022-11-25 NOTE — ASSESSMENT & PLAN NOTE
65year old female former smoker w/HTN, HLD, uncontrolled type II DM (A1c 9 8), hx CVA, presenting with nonhealing extensive L heel ulceration and R hallux and 5th digit ulceration  Vascular surgery consulted for input  S/p multiple b/l endovascular interventions  JUSTIN  R hemispheric CVA    S/p L AKA 11/23/22 (Jay)    Hgb 7 4  WBC 12 00      Recommendations:  - Bilateral tissue loss in the setting of uncontrolled type II DM and NYDIA on admission, now s/p multiple angiograms w/intervention    - Endovascular interventions, c/b atheroembolic event to the RLE and JUSTIN with R hemispheric CVA  - Now s/p L AKA on 11/23/22- preveena VAC in place, to be discontinued on 11/30/22  - Will continue to observe RLE to allow poor perfused superficial tissue loss to demarcate/slough to allow us to determine level of necessary RLE amputation as long as patient remains stable and not septic  There appears to be improvement in skin integrity   - Medical management with statin, Plavix  - Medical management and glucose control per SLIM  Continue to trend Hgb  - If patient becomes septic or systemically ill (presumed to be secondary to RLE), would consider guillotine amputation of the RLE in the emergent setting  However will try to avoid this as long as possible    - Cont argatroban drip in setting of JUSTIN  - Continue PCA pain management  - Will d/w Dr German Zaragoza

## 2022-11-25 NOTE — ASSESSMENT & PLAN NOTE
S/p left aka 11/23/2022  · Post-op wound management per vascular surgery  · Await next step in management from vascular surgery, currently planned for possible right BKA  TBD    · PCA pump in place as ordered by the surgical service

## 2022-11-25 NOTE — PROGRESS NOTES
[address] [facility]  Progress Note - [address] 1960, 65 y o  female MRN: [id number]  Unit/Bed#: E4 -01 Encounter: [id number]  Primary Care Provider: ALEX Wynne   Date and time admitted to hospital: 10/21/2022  7:58 PM    * Stroke [facility])  Assessment & Plan  65year old female is currently admitted due to a moderate to large volume left MCA territory stroke  · Repeat CT head showed an evolving acute infarct in right parietal-temporal-occipital lobes with questionable petechial cortical hemorrhage in right parietal lobe  · Discussed the findings above with Dr Socrates De Leon  He recommended that we continue anticoagulation for now due to the clinical insignificance of the size and the risk of taking her off anticoagulation far outweighs the benefits off of it, not unless new contraindications arise  · Continue argatroban until all surgical interventions are completed    PAD (peripheral artery disease) ([facility] [address] )  Assessment & Plan  PAD with multiple bilateral lower extremity endovascular interventions, c/b atheroembolic event to the RLE and JUSTIN with R hemispheric CVA  In the setting of, Uncontrolled diabetes, hypertension, hyperlipidemia  Lost to follow-up and poorly compliant to diabetic regimen  · Interventions: S/p abdominal aortogram, LLE SFA shockwave angioplasty/FATOU 10/31/22  S/p R femoral endarterectomy 11/7  S/p aspiration thrombectomy in right common iliac artery and left SFA  · Continue statin / Plavix  · S/p Left AKA 11/23/2022  · Maintain Argatroban (Not on a heparin drip due to concern for thrombocytopenia / HIT) for now since vascular service is considering right BKA sometime next week  UTI (urinary tract infection)  Assessment & Plan  UA positive, but likely contaminant rather than an actual urinary tract infection  Fever spike overnight, but no shortness of breath or dysuria, possibly post-op fever  · Appreciate ID recommendations   Monitor off antibiotics  Will reconsult ID if she spikes again overnight  Thrombocytopenia ([facility] [address] )  Assessment & Plan  Possibly due to HIT  Resolved  Had reactive thrombocytosis, but also resolved today 11/25/2022  · Awaiting heparin antibody by serotonin release (performed 11/11)  · Heparin induced antibody elevated    Recent Labs     11/23/22  0504 11/24/22  0527 11/25/22  0654   * 538* 364         Diabetic ulcer of heel associated with diabetes mellitus due to underlying condition [facility])  Assessment & Plan  S/p left aka 11/23/2022  · Post-op wound management per vascular surgery  · Await next step in management from vascular surgery, currently planned for possible right BKA  TBD  · PCA pump in place as ordered by the surgical service    Acute on chronic anemia  Assessment & Plan  S/p 4U pRBC  Will monitor for post-op bleeding  Hemoglobin of 4 8, likely erroneous given repeat bloodwork coming back normal  Will order a repeat hemoglobin later this afternoon given the drop of hemoglobin overnight  Recent Labs     11/23/22  0504 11/24/22  0527 11/24/22  0813 11/25/22  0654   HGB 8 7* 4 8* 8 8* 7 4*         Hypokalemia  Assessment & Plan  Likely due to GI losses   Resolved  · Continue repleting PRN    Recent Labs     11/23/22  0504 11/24/22  0527 11/25/22  0654   K 3 3* 3 6 3 6   MG 1 6  --  1 5*         Type 2 diabetes mellitus with hyperglycemia, with long-term current use of insulin [facility])  Assessment & Plan  Lab Results   Component Value Date    HGBA1C 9 8 (H) 10/25/2022     Recent Labs     11/24/22  1140 11/24/22  1552 11/24/22  2048 11/25/22  0730   POCGLU 117 113 119 159*     · Inpatient regimen: Sliding scale, Glargine 15U HS    Benign essential hypertension  Assessment & Plan  Above goal  · Continue amlodipine, Cardura    Depression, recurrent (HCC)  Assessment & Plan  Evaluated by Psychiatry on this admission  · Continue Zoloft 150 mg daily / Quetiapine 25mg po Qhs    Acute respiratory failure with hypoxia [facility])  Assessment & Plan  Echo on  shows 12% LVEF, no diastolic dysfunction  Postprocedural hypoxia  · Currently on 1L NC    VTE Pharmacologic Prophylaxis:   Pharmacologic: Argatroban  Mechanical VTE Prophylaxis in Place: Yes    Discussions with Specialists or Other Care Team Provider: nursing    Education and Discussions with Family / Patient: patient    Time Spent for Care: 30 minutes  More than 50% of total time spent on counseling and coordination of care as described above  Current Length of Stay: 34 day(s)    Current Patient Status: Inpatient   Certification Statement: The patient will continue to require additional inpatient hospital stay due to vascular reval, possible intervention, repeat hemoglobin    Discharge Plan: active    Code Status: Level 1 - Full Code      Subjective:   Patient seen and examined at bedside  Complains of pain at her stump site  She is aware of the planned vascular intervention sometime next week  Objective:     Vitals:   Temp (24hrs), Av 7 °F (37 1 °C), Min:97 6 °F (36 4 °C), Max:100 8 °F (38 2 °C)    Temp:  [97 6 °F (36 4 °C)-100 8 °F (38 2 °C)] 100 8 °F (38 2 °C)  HR:  [84-94] 94  Resp:  [16-20] 18  BP: (136-176)/(61-81) 156/61  SpO2:  [92 %-99 %] 97 %  Body mass index is 34 21 kg/m²  Input and Output Summary (last 24 hours): Intake/Output Summary (Last 24 hours) at 2022 0925  Last data filed at 2022 0700  Gross per 24 hour   Intake 3 ml   Output 850 ml   Net -847 ml       Physical Exam:     Physical Exam  Vitals reviewed  Constitutional:       General: She is not in acute distress  HENT:      Head: Normocephalic  Nose: Nose normal       Mouth/Throat:      Mouth: Mucous membranes are moist    Eyes:      General: No scleral icterus  Cardiovascular:      Rate and Rhythm: Normal rate  Pulmonary:      Effort: Pulmonary effort is normal  No respiratory distress  Breath sounds: No wheezing or rales     Abdominal:      General: There is no distension  Palpations: Abdomen is soft  Tenderness: There is no abdominal tenderness  Musculoskeletal:      Comments: Left aka, right lower extremity dressing intact with chronic wounds and necrotic toe   Skin:     General: Skin is warm  Neurological:      Mental Status: She is alert and oriented to person, place, and time  Psychiatric:         Mood and Affect: Mood normal          Behavior: Behavior normal        Additional Data:     Labs:    Results from last 7 days   Lab Units 11/25/22  0654   WBC Thousand/uL 12 00*   HEMOGLOBIN g/dL 7 4*   HEMATOCRIT % 24 7*   PLATELETS Thousands/uL 364   NEUTROS PCT % 88*   LYMPHS PCT % 5*   MONOS PCT % 5   EOS PCT % 1     Results from last 7 days   Lab Units 11/25/22  0654   SODIUM mmol/L 140   POTASSIUM mmol/L 3 6   CHLORIDE mmol/L 103   CO2 mmol/L 30   BUN mg/dL 10   CREATININE mg/dL 0 80   ANION GAP mmol/L 7   CALCIUM mg/dL 7 5*   GLUCOSE RANDOM mg/dL 166*     Results from last 7 days   Lab Units 11/24/22  0527   INR  2 40*     Results from last 7 days   Lab Units 11/25/22  0730 11/24/22  2048 11/24/22  1552 11/24/22  1140 11/24/22  0721 11/23/22  2030 11/23/22  1634 11/23/22  1104 11/23/22  0954 11/22/22  2051 11/22/22  1538 11/22/22  1142   POC GLUCOSE mg/dl 159* 119 113 117 134 95 103 121 119 116 134 167*                   * I Have Reviewed All Lab Data Listed Above  * Additional Pertinent Lab Tests Reviewed: Nilda [address] Admission Reviewed      Lines:  Invasive Devices     Peripherally Inserted Central Catheter Line  Duration           PICC Line 33/73/94 Right Basilic 6 days          Peripheral Intravenous Line  Duration           Peripheral IV 11/23/22 Dorsal (posterior); Left;Proximal Forearm 1 day          Drain  Duration           Urethral Catheter Non-latex 18 Fr  8 days               Imaging:    Imaging Reports Reviewed Today Include: no new imaging    Recent Cultures (last 7 days):           Last 24 Hours Medication List: Current Facility-Administered Medications   Medication Dose Route Frequency Provider Last Rate   • acetaminophen  650 mg Oral Q6H [address] Nando Oliveros MD     • ALPRAZolam  0 25 mg Oral Daily PRN Nando Oliveros MD     • amLODIPine  10 mg Oral Daily Nando Oliveros MD     • argatroban  0 1-3 mcg/kg/min Intravenous Titrated Nando Oliveros MD 0 2 mcg/kg/min (11/25/22 3921)   • atorvastatin  40 mg Oral Daily With Poli Meek MD     • clopidogrel  75 mg Oral Daily Nando Oliveros MD     • doxazosin  2 mg Oral Daily Nando Oliveros MD     • gabapentin  200 mg Oral BID Nando Oliveros MD     • HYDROmorphone   Intravenous Continuous Nando Oliveros MD     • HYDROmorphone  0 5 mg Intravenous Q3H PRN Nando Oliveros MD     • insulin glargine  15 Units Subcutaneous HS Nando Oliveros MD     • insulin lispro  1-6 Units Subcutaneous TID AC Nando Oliveros MD     • magnesium sulfate  2 g Intravenous Once Jonathan Ladd MD     • metoclopramide  10 mg Intravenous Q6H PRN Nando Oliveros MD     • naloxone  0 04 mg Intravenous Q1MIN PRN Nando Oliveros MD     • naloxone  0 04 mg Intravenous Q3 min PRN Nando Oliveros MD     • nystatin   Topical BID Nando Oliveros MD     • ondansetron  4 mg Intravenous Q6H PRN Nando Oliveros MD     • oxyCODONE  5 mg Oral Q4H PRN Nando Oliveros MD     • pantoprazole  40 mg Oral Early Morning Nando Oliveros MD     • polyethylene glycol  17 g Oral Daily Jonathan Ladd MD     • QUEtiapine  25 mg Oral HS Nando Oliveros MD     • senna  1 tablet Oral BID Jonathan Ladd MD     • sertraline  150 mg Oral Daily Nando Oliveros MD     • sodium chloride  20 mL/hr Intravenous Continuous Nando Oliveros MD 20 mL/hr (11/23/22 0468)        Today, Patient Was Seen By: Cj García MD    ** Please Note: Dictation voice to text software may have been used in the creation of this document   **

## 2022-11-25 NOTE — ASSESSMENT & PLAN NOTE
Lab Results   Component Value Date    HGBA1C 9 8 (H) 10/25/2022     Recent Labs     11/24/22  1140 11/24/22  1552 11/24/22 2048 11/25/22  0730   POCGLU 117 113 119 159*     · Inpatient regimen: Sliding scale, Glargine 15U HS

## 2022-11-25 NOTE — WOUND OSTOMY CARE
Progress Note - Wound   Luda Neat 65 y o  female MRN: [id number]  Unit/Bed#: E4 -01 Encounter: [id number]        Assessment:   Patient seen for wound care follow-up along with primary RN  Patient used PCA pump prior to assessment  She has a pa cathter in place, incontinent of stool  Requires maximum assist to turn in bed, positioning wedges in use, on low air-loss mattress  Nutrition team following  Buttocks and sacrum intact without redness--preventative foam dressing in place  Left AKA site with pravena VAC dressing intact per surgery team     1   Right medial/lateral leg--scattered serous fluid filled and re-absorbing blisters and light purple vascular changes (lighter in color than previous assessments)  Scattered areas of yellow/brown/black eschars and slough to circumferential lower leg and medial/lateral thigh  Small serous drainage  No induration  Vascular surgery following, allowing areas to demarcate  2   Right foot--toes are black/brown and necrotic  Entire foot dark purple/black in color  Betadine applied and area covered per podiatry/vascular orders  Vascular surgery following closely--patient for BKA vs AKA in future  3  MASD/intertriginous dermatitis to anterior and left abdominal fold--RESOLVED  4  Left groin puncture site--yellow slough, scant tan/serous drainage  Michaela-wound pink  No induration  See flowsheet for wound details  Wound Care Plan:   1-P500 low air-loss mattress  2-Elevate right heel/leg off of bed/chair surface to offload pressure  3-Turn/reposition every 2 hours while in bed using positioning wedges for pressure re-distribution on skin  4-Left AKA and right foot--per podiatry and vascular team   5-Left groin puncture site--cleanse with normal saline, pat dry  Apply Maxorb Ag and dry dressing (lay into fold)  Change dressing daily and as needed with soilage  6-Right thigh/leg--cleanse with normal saline, pat dry    Apply Xeroform to medial/lateral thigh eschars, and adaptic to lower leg open areas/blisters  Cover with ABDs and wrap with rolled gauze  7-Apply Allevyn Life foam dressing to midline sacrum (small sacral) for prevention  Scotty with P   Peel back at least daily for skin assessment and re-apply  Change dressing every 3 days and PRN  Wound care team to follow  Wound 10/22/22 Toe (Comment  which one) Anterior;Right (Active)   Wound Description Black; Brown;Necrotic 11/25/22 1354   Treatments Other (Comment)-betadine 11/25/22 1354   Dressing Open to air 11/25/22 1354       Wound 11/07/22 Groin Left (Active)   Wound Image   11/25/22 1411   Wound Description Yellow;Slough 11/25/22 1411   Michaela-wound Assessment Pink 11/25/22 1411   Wound Length (cm) 0 5 cm 11/25/22 1411   Wound Width (cm) 0 5 cm 11/25/22 1411   Wound Depth (cm) 0 2 cm 11/25/22 1411   Wound Surface Area (cm^2) 0 25 cm^2 11/25/22 1411   Wound Volume (cm^3) 0 05 cm^3 11/25/22 1411   Calculated Wound Volume (cm^3) 0 05 cm^3 11/25/22 1411   Drainage Amount Scant 11/25/22 1411   Drainage Description [place];Serous 11/25/22 1411   Treatments Cleansed 11/25/22 1411   Dressing Calcium Alginate with Silver;Dry dressing 11/25/22 1411   Dressing Changed New 11/25/22 1411   Patient Tolerance Tolerated well 11/25/22 1411   Dressing Status Clean;Dry; Intact 11/25/22 1411       Wound 11/11/22 Thigh Right;Lateral (Active)   Wound Image   11/25/22 1352   Wound Description Brown;Black; Yellow;Slough 11/25/22 1352   Michaela-wound Assessment [place] 11/25/22 1352   Wound Length (cm) 21 cm 11/25/22 1352   Wound Width (cm) 19 cm 11/25/22 1352   Wound Depth (cm) 0 1 cm 11/25/22 1352   Wound Surface Area (cm^2) 399 cm^2 11/25/22 1352   Wound Volume (cm^3) 39 9 cm^3 11/25/22 1352   Calculated Wound Volume (cm^3) 39 9 cm^3 11/25/22 1352   Change in Wound Size % -850 11/25/22 1352   Drainage Amount Scant 11/25/22 1352   Drainage Description Serous 11/25/22 1352   Treatments Cleansed;Irrigation with NSS;Elevated 11/25/22 1352   Dressing Xeroform;ABD 11/25/22 1352   Dressing Changed Changed 11/25/22 1352   Patient Tolerance Tolerated well 11/25/22 1352   Dressing Status Clean;Dry; Intact 11/25/22 1352       Wound 11/11/22 Foot Right;Lateral (Active)   Wound Image    11/25/22 1354   Wound Description Black; Light purple 11/25/22 1354   Drainage Amount None 11/25/22 1354   Treatments Other (Comment)-betadine 11/25/22 1354   Dressing ABD;Gauze 11/25/22 1354   Dressing Changed Changed 11/25/22 1354   Patient Tolerance Tolerated well 11/25/22 1354   Dressing Status Clean;Dry; Intact 11/25/22 1354       Wound 11/25/22 Leg Right (Active)   Wound Image    11/25/22 1411   Wound Description Yellow;Slough;Brown;Light purple;Eschar 11/25/22 1411   Michaela-wound Assessment Edema 11/25/22 1411   Wound Length (cm) 42 cm 11/25/22 1411   Wound Depth (cm) 0 2 cm 11/25/22 1411   Drainage Amount Small 11/25/22 1411   Drainage Description Serous 11/25/22 1411   Treatments Cleansed;Elevated 11/25/22 1411   Dressing Non adherent;ABD;Gauze 11/25/22 1411   Dressing Changed Changed 11/25/22 1411   Patient Tolerance Tolerated well 11/25/22 1411   Dressing Status Clean; Intact;Dry 11/25/22 [address], RN, [facility]

## 2022-11-26 LAB
ALBUMIN SERPL BCP-MCNC: 1.1 G/DL (ref 3.5–5)
ALP SERPL-CCNC: 424 U/L (ref 46–116)
ALT SERPL W P-5'-P-CCNC: 27 U/L (ref 12–78)
ANION GAP SERPL CALCULATED.3IONS-SCNC: 8 MMOL/L (ref 4–13)
APTT PPP: 19 SECONDS (ref 23–37)
APTT PPP: 60 SECONDS (ref 23–37)
AST SERPL W P-5'-P-CCNC: 45 U/L (ref 5–45)
BASOPHILS # BLD AUTO: 0.02 THOUSANDS/ÂΜL (ref 0–0.1)
BASOPHILS NFR BLD AUTO: 0 % (ref 0–1)
BILIRUB SERPL-MCNC: 0.34 MG/DL (ref 0.2–1)
BUN SERPL-MCNC: 8 MG/DL (ref 5–25)
CALCIUM ALBUM COR SERPL-MCNC: 9.8 MG/DL (ref 8.3–10.1)
CALCIUM SERPL-MCNC: 7.5 MG/DL (ref 8.3–10.1)
CHLORIDE SERPL-SCNC: 101 MMOL/L (ref 96–108)
CO2 SERPL-SCNC: 29 MMOL/L (ref 21–32)
CREAT SERPL-MCNC: 0.75 MG/DL (ref 0.6–1.3)
EOSINOPHIL # BLD AUTO: 0.05 THOUSAND/ÂΜL (ref 0–0.61)
EOSINOPHIL NFR BLD AUTO: 1 % (ref 0–6)
ERYTHROCYTE [DISTWIDTH] IN BLOOD BY AUTOMATED COUNT: 14.7 % (ref 11.6–15.1)
GFR SERPL CREATININE-BSD FRML MDRD: 86 ML/MIN/1.73SQ M
GLUCOSE SERPL-MCNC: 144 MG/DL (ref 65–140)
GLUCOSE SERPL-MCNC: 149 MG/DL (ref 65–140)
GLUCOSE SERPL-MCNC: 158 MG/DL (ref 65–140)
GLUCOSE SERPL-MCNC: 174 MG/DL (ref 65–140)
GLUCOSE SERPL-MCNC: 182 MG/DL (ref 65–140)
HCT VFR BLD AUTO: 24.2 % (ref 34.8–46.1)
HGB BLD-MCNC: 7.6 G/DL (ref 11.5–15.4)
IMM GRANULOCYTES # BLD AUTO: 0.05 THOUSAND/UL (ref 0–0.2)
IMM GRANULOCYTES NFR BLD AUTO: 1 % (ref 0–2)
LYMPHOCYTES # BLD AUTO: 0.61 THOUSANDS/ÂΜL (ref 0.6–4.47)
LYMPHOCYTES NFR BLD AUTO: 6 % (ref 14–44)
MAGNESIUM SERPL-MCNC: 1.7 MG/DL (ref 1.6–2.6)
MCH RBC QN AUTO: 27.1 PG (ref 26.8–34.3)
MCHC RBC AUTO-ENTMCNC: 31.4 G/DL (ref 31.4–37.4)
MCV RBC AUTO: 86 FL (ref 82–98)
MONOCYTES # BLD AUTO: 0.54 THOUSAND/ÂΜL (ref 0.17–1.22)
MONOCYTES NFR BLD AUTO: 6 % (ref 4–12)
NEUTROPHILS # BLD AUTO: 8.25 THOUSANDS/ÂΜL (ref 1.85–7.62)
NEUTS SEG NFR BLD AUTO: 86 % (ref 43–75)
NRBC BLD AUTO-RTO: 0 /100 WBCS
PHOSPHATE SERPL-MCNC: 2.8 MG/DL (ref 2.3–4.1)
PLATELET # BLD AUTO: 296 THOUSANDS/UL (ref 149–390)
PMV BLD AUTO: 7.8 FL (ref 8.9–12.7)
POTASSIUM SERPL-SCNC: 3.1 MMOL/L (ref 3.5–5.3)
PROT SERPL-MCNC: 5.8 G/DL (ref 6.4–8.4)
RBC # BLD AUTO: 2.8 MILLION/UL (ref 3.81–5.12)
SODIUM SERPL-SCNC: 138 MMOL/L (ref 135–147)
WBC # BLD AUTO: 9.52 THOUSAND/UL (ref 4.31–10.16)

## 2022-11-26 PROCEDURE — 85730 THROMBOPLASTIN TIME PARTIAL: CPT | Performed by: STUDENT IN AN ORGANIZED HEALTH CARE EDUCATION/TRAINING PROGRAM

## 2022-11-26 PROCEDURE — 82948 REAGENT STRIP/BLOOD GLUCOSE: CPT

## 2022-11-26 PROCEDURE — 84100 ASSAY OF PHOSPHORUS: CPT | Performed by: STUDENT IN AN ORGANIZED HEALTH CARE EDUCATION/TRAINING PROGRAM

## 2022-11-26 PROCEDURE — 99232 SBSQ HOSP IP/OBS MODERATE 35: CPT | Performed by: INTERNAL MEDICINE

## 2022-11-26 PROCEDURE — 83735 ASSAY OF MAGNESIUM: CPT | Performed by: STUDENT IN AN ORGANIZED HEALTH CARE EDUCATION/TRAINING PROGRAM

## 2022-11-26 PROCEDURE — 80053 COMPREHEN METABOLIC PANEL: CPT | Performed by: STUDENT IN AN ORGANIZED HEALTH CARE EDUCATION/TRAINING PROGRAM

## 2022-11-26 PROCEDURE — 85025 COMPLETE CBC W/AUTO DIFF WBC: CPT | Performed by: STUDENT IN AN ORGANIZED HEALTH CARE EDUCATION/TRAINING PROGRAM

## 2022-11-26 PROCEDURE — NC001 PR NO CHARGE: Performed by: SURGERY

## 2022-11-26 RX ORDER — POTASSIUM CHLORIDE 20 MEQ/1
40 TABLET, EXTENDED RELEASE ORAL ONCE
Status: COMPLETED | OUTPATIENT
Start: 2022-11-26 | End: 2022-11-26

## 2022-11-26 RX ORDER — POLYETHYLENE GLYCOL 3350 17 G/17G
17 POWDER, FOR SOLUTION ORAL 2 TIMES DAILY
Status: DISCONTINUED | OUTPATIENT
Start: 2022-11-26 | End: 2023-01-12 | Stop reason: HOSPADM

## 2022-11-26 RX ADMIN — CLOPIDOGREL BISULFATE 75 MG: 75 TABLET ORAL at 08:03

## 2022-11-26 RX ADMIN — POTASSIUM CHLORIDE 40 MEQ: 1500 TABLET, EXTENDED RELEASE ORAL at 08:10

## 2022-11-26 RX ADMIN — ACETAMINOPHEN 650 MG: 325 TABLET, FILM COATED ORAL at 17:15

## 2022-11-26 RX ADMIN — DOXAZOSIN 2 MG: 2 TABLET ORAL at 08:03

## 2022-11-26 RX ADMIN — NYSTATIN: 100000 POWDER TOPICAL at 08:03

## 2022-11-26 RX ADMIN — SERTRALINE HYDROCHLORIDE 150 MG: 100 TABLET ORAL at 08:03

## 2022-11-26 RX ADMIN — ATORVASTATIN CALCIUM 40 MG: 40 TABLET, FILM COATED ORAL at 16:35

## 2022-11-26 RX ADMIN — INSULIN LISPRO 1 UNITS: 100 INJECTION, SOLUTION INTRAVENOUS; SUBCUTANEOUS at 16:35

## 2022-11-26 RX ADMIN — SENNOSIDES 8.6 MG: 8.6 TABLET, FILM COATED ORAL at 08:03

## 2022-11-26 RX ADMIN — QUETIAPINE FUMARATE 25 MG: 25 TABLET ORAL at 23:18

## 2022-11-26 RX ADMIN — ACETAMINOPHEN 650 MG: 325 TABLET, FILM COATED ORAL at 23:17

## 2022-11-26 RX ADMIN — POLYETHYLENE GLYCOL 3350 17 G: 17 POWDER, FOR SOLUTION ORAL at 08:03

## 2022-11-26 RX ADMIN — INSULIN GLARGINE 15 UNITS: 100 INJECTION, SOLUTION SUBCUTANEOUS at 23:17

## 2022-11-26 RX ADMIN — PANTOPRAZOLE SODIUM 40 MG: 40 TABLET, DELAYED RELEASE ORAL at 05:39

## 2022-11-26 RX ADMIN — AMLODIPINE BESYLATE 10 MG: 10 TABLET ORAL at 08:03

## 2022-11-26 RX ADMIN — ACETAMINOPHEN 650 MG: 325 TABLET, FILM COATED ORAL at 12:23

## 2022-11-26 RX ADMIN — SENNOSIDES 8.6 MG: 8.6 TABLET, FILM COATED ORAL at 17:15

## 2022-11-26 RX ADMIN — ACETAMINOPHEN 650 MG: 325 TABLET, FILM COATED ORAL at 05:39

## 2022-11-26 RX ADMIN — NYSTATIN: 100000 POWDER TOPICAL at 17:15

## 2022-11-26 RX ADMIN — GABAPENTIN 200 MG: 100 CAPSULE ORAL at 08:03

## 2022-11-26 RX ADMIN — SODIUM CHLORIDE 20 ML/HR: 0.9 INJECTION, SOLUTION INTRAVENOUS at 21:34

## 2022-11-26 RX ADMIN — GABAPENTIN 200 MG: 100 CAPSULE ORAL at 17:15

## 2022-11-26 NOTE — ASSESSMENT & PLAN NOTE
UA positive, but likely contaminant rather than an actual urinary tract infection  Fever spike overnight, but no shortness of breath or dysuria, possibly post-op fever  · Appreciate ID recommendations  Monitor off antibiotics  Will reconsult ID if she spikes again overnight  · Will check procal  Check blood cultures if temp is consistently 101

## 2022-11-26 NOTE — ASSESSMENT & PLAN NOTE
S/p 4U pRBC  Will monitor for post-op bleeding  Hemoglobin of 4 8, likely erroneous given repeat bloodwork coming back normal  Hemoglobin is 7 6  Will monitor and transfuse as needed       Recent Labs     11/24/22  0813 11/25/22  0654 11/25/22  1414 11/26/22  0339   HGB 8 8* 7 4* 7 9* 7 6*

## 2022-11-26 NOTE — PLAN OF CARE
Problem: MOBILITY - ADULT  Goal: Maintain or return to baseline ADL function  Description: INTERVENTIONS:  -  Assess patient's ability to carry out ADLs; assess patient's baseline for ADL function and identify physical deficits which impact ability to perform ADLs (bathing, care of mouth/teeth, toileting, grooming, dressing, etc )  - Assess/evaluate cause of self-care deficits   - Assess range of motion  - Assess patient's mobility; develop plan if impaired  - Assess patient's need for assistive devices and provide as appropriate  - Encourage maximum independence but intervene and supervise when necessary  - Involve family in performance of ADLs  - Assess for home care needs following discharge   - Consider OT consult to assist with ADL evaluation and planning for discharge  - Provide patient education as appropriate  Outcome: Progressing  Goal: Maintains/Returns to pre admission functional level  Description: INTERVENTIONS:  - Perform BMAT or MOVE assessment daily    - Set and communicate daily mobility goal to care team and patient/family/caregiver  - Collaborate with rehabilitation services on mobility goals if consulted  - Assist patient in repositioning every 2 hours  - Dangle patient 3 times a day  - Stand patient 3 times a day  - Out of bed to chair as tolerated  - Out of bed for meals  - Out of bed for toileting  - Record patient progress and toleration of activity level   Outcome: Progressing     Problem: Knowledge Deficit  Goal: Patient/family/caregiver demonstrates understanding of disease process, treatment plan, medications, and discharge instructions  Description: Complete learning assessment and assess knowledge base    Interventions:  - Provide teaching at level of understanding  - Provide teaching via preferred learning methods  Outcome: Progressing     Problem: DISCHARGE PLANNING  Goal: Discharge to home or other facility with appropriate resources  Description: INTERVENTIONS:  - Identify barriers to discharge w/patient   - Arrange for needed discharge resources and transportation as appropriate  - Identify discharge learning needs  - Refer to Case Management Department for coordinating discharge planning if the patient needs post-hospital services based on physician/advanced practitioner order   Outcome: Progressing

## 2022-11-26 NOTE — ASSESSMENT & PLAN NOTE
Likely due to GI losses   Resolved  · Continue repleting PRN    Recent Labs     11/24/22  0527 11/25/22  0654 11/26/22  0339   K 3 6 3 6 3 1*   MG  --  1 5* 1 7

## 2022-11-26 NOTE — ASSESSMENT & PLAN NOTE
Echo on 11/07 shows 90% LVEF, no diastolic dysfunction   Postprocedural hypoxia  · Currently on 1L NC

## 2022-11-26 NOTE — PROGRESS NOTES
[facility]  Progress Note - Hannah Steiner 1960, 65 y o  female MRN: [id number]  Unit/Bed#: E4 -01 Encounter: [id number]  Primary Care Provider: ALEX Son   Date and time admitted to hospital: 10/21/2022  7:58 PM    UTI (urinary tract infection)  Assessment & Plan  UA positive, but likely contaminant rather than an actual urinary tract infection  Fever spike overnight, but no shortness of breath or dysuria, possibly post-op fever  · Appreciate ID recommendations  Monitor off antibiotics  Will reconsult ID if she spikes again overnight  · Will check procal  Check blood cultures if temp is consistently 101  Thrombocytopenia ([facility] [address] )  Assessment & Plan  Possibly due to HIT  Resolved  Had reactive thrombocytosis, but also resolved today 11/25/2022  · Awaiting heparin antibody by serotonin release (performed 11/11)  · Heparin induced antibody elevated  · Changed from heparin to argatroban     Recent Labs     11/24/22  0527 11/25/22  0654 11/26/22  0339   * 364 296         Diabetic ulcer of heel associated with diabetes mellitus due to underlying condition [facility])  Assessment & Plan  S/p left aka 11/23/2022  · Post-op wound management per vascular surgery  · Await next step in management from vascular surgery, currently planned for possible right BKA  TBD  · PCA pump in place as ordered by the surgical service    Acute on chronic anemia  Assessment & Plan  S/p 4U pRBC  Will monitor for post-op bleeding  Hemoglobin of 4 8, likely erroneous given repeat bloodwork coming back normal  Hemoglobin is 7 6  Will monitor and transfuse as needed  Recent Labs     11/24/22  0813 11/25/22  0654 11/25/22  1414 11/26/22  0339   HGB 8 8* 7 4* 7 9* 7 6*         PAD (peripheral artery disease) (HCC)  Assessment & Plan  PAD with multiple bilateral lower extremity endovascular interventions, c/b atheroembolic event to the RLE and JUSTIN with R hemispheric CVA   In the setting of, Uncontrolled diabetes, hypertension, hyperlipidemia  Lost to follow-up and poorly compliant to diabetic regimen  · Interventions: S/p abdominal aortogram, LLE SFA shockwave angioplasty/FATOU 10/31/22  S/p R femoral endarterectomy 11/7  S/p aspiration thrombectomy in right common iliac artery and left SFA  · Continue statin / Plavix  · S/p Left AKA 11/23/2022  · Maintain Argatroban (Not on a heparin drip due to concern for thrombocytopenia / HIT) for now since vascular service is considering right BKA sometime next week  Hypokalemia  Assessment & Plan  Likely due to GI losses  Resolved  · Continue repleting PRN    Recent Labs     11/24/22  0527 11/25/22  0654 11/26/22  0339   K 3 6 3 6 3 1*   MG  --  1 5* 1 7         Type 2 diabetes mellitus with hyperglycemia, with long-term current use of insulin [facility])  Assessment & Plan  Lab Results   Component Value Date    HGBA1C 9 8 (H) 10/25/2022     Recent Labs     11/25/22  1607 11/25/22  2138 11/26/22  0805 11/26/22  1114   POCGLU 125 166* 149* 144*     · Inpatient regimen: Sliding scale, Glargine 15U HS    Benign essential hypertension  Assessment & Plan  Above goal  · Continue amlodipine, Cardura    Depression, recurrent (HCC)  Assessment & Plan  Evaluated by Psychiatry on this admission  · Continue Zoloft 150 mg daily / Quetiapine 25mg po Qhs    Acute respiratory failure with hypoxia ([address] )  Assessment & Plan  Echo on 11/07 shows 75% LVEF, no diastolic dysfunction  Postprocedural hypoxia  · Currently on 1L NC    * Stroke [facility])  Assessment & Plan  65year old female is currently admitted due to a moderate to large volume left MCA territory stroke  · Repeat CT head showed an evolving acute infarct in right parietal-temporal-occipital lobes with questionable petechial cortical hemorrhage in right parietal lobe  · Discussed the findings above with Dr Dillon Barcenas   He recommended that we continue anticoagulation for now due to the clinical insignificance of the size and the risk of taking her off anticoagulation far outweighs the benefits off of it, not unless new contraindications arise  · Continue argatroban until all surgical interventions are completed      VTE Pharmacologic Prophylaxis: argatroban     Education and Discussions with Family / Patient: patient    Time Spent for Care: 20 minutes  More than 50% of total time spent on counseling and coordination of care as described above  Current Length of Stay: 35 day(s)  Current Patient Status: Inpatient     Discharge Plan / Estimated Discharge Date: eventually will require str  Awaiting to see amputation that will be required for rle     Code Status: Level 1 - Full Code      Subjective:   Pt seen and examined  Pt was sleeping but woke up easily  She used pca pump over night  She had a temp yesterday but none this am  She is receiving around the clock tylenol  She does have chills this am  Still with decreased po intake at times  Objective:     Vitals:   Temp (24hrs), Av 5 °F (36 9 °C), Min:96 2 °F (35 7 °C), Max:101 1 °F (38 4 °C)    Temp:  [96 2 °F (35 7 °C)-101 1 °F (38 4 °C)] 96 2 °F (35 7 °C)  HR:  [55-97] 86  Resp:  [18-20] 20  BP: (136-169)/(58-90) 147/63  SpO2:  [93 %-98 %] 97 %  Body mass index is 34 21 kg/m²  Input and Output Summary (last 24 hours): Intake/Output Summary (Last 24 hours) at 2022 1128  Last data filed at 2022 0600  Gross per 24 hour   Intake 4 2 ml   Output 1550 ml   Net -1545 8 ml       Physical Exam:   Physical Exam  Constitutional:       Appearance: Normal appearance  HENT:      Head: Normocephalic and atraumatic  Eyes:      Extraocular Movements: Extraocular movements intact  Pupils: Pupils are equal, round, and reactive to light  Cardiovascular:      Rate and Rhythm: Normal rate and regular rhythm  Heart sounds: No murmur heard  No friction rub  No gallop  Pulmonary:      Effort: Pulmonary effort is normal  No respiratory distress  Breath sounds: Normal breath sounds  No wheezing, rhonchi or rales  Abdominal:      General: Bowel sounds are normal  There is no distension  Palpations: Abdomen is soft  Tenderness: There is no abdominal tenderness  There is no guarding  Musculoskeletal:      Comments: Left aka  Right foot with dry gangrene  Neurological:      Mental Status: She is alert and oriented to person, place, and time  Additional Data:     Labs:  Results from last 7 days   Lab Units 11/26/22  0339   WBC Thousand/uL 9 52   HEMOGLOBIN g/dL 7 6*   HEMATOCRIT % 24 2*   PLATELETS Thousands/uL 296   NEUTROS PCT % 86*   LYMPHS PCT % 6*   MONOS PCT % 6   EOS PCT % 1     Results from last 7 days   Lab Units 11/26/22  0339   SODIUM mmol/L 138   POTASSIUM mmol/L 3 1*   CHLORIDE mmol/L 101   CO2 mmol/L 29   BUN mg/dL 8   CREATININE mg/dL 0 75   ANION GAP mmol/L 8   CALCIUM mg/dL 7 5*   ALBUMIN g/dL 1 1*   TOTAL BILIRUBIN mg/dL 0 34   ALK PHOS U/L 424*   ALT U/L 27   AST U/L 45   GLUCOSE RANDOM mg/dL 174*     Results from last 7 days   Lab Units 11/24/22  0527   INR  2 40*     Results from last 7 days   Lab Units 11/26/22  1114 11/26/22  0805 11/25/22  2138 11/25/22  1607 11/25/22  1104 11/25/22  0730 11/24/22  2048 11/24/22  1552 11/24/22  1140 11/24/22  0721 11/23/22  2030 11/23/22  1634   POC GLUCOSE mg/dl 144* 149* 166* 125 121 159* 119 113 117 134 95 103               Recent Cultures (last 7 days):           Lines/Drains:  Invasive Devices     Peripherally Inserted Central Catheter Line  Duration           PICC Line 73/25/77 Right Basilic 7 days          Peripheral Intravenous Line  Duration           Peripheral IV 11/23/22 Dorsal (posterior); Left;Proximal Forearm 2 days          Drain  Duration           Urethral Catheter Non-latex 18 Fr  9 days              Last 24 Hours Medication List:   Current Facility-Administered Medications   Medication Dose Route Frequency Provider Last Rate   • acetaminophen  650 mg Oral Q6H [address] Jorden Person, MD     • ALPRAZolam  0 25 mg Oral Daily PRN Jorden Person, MD     • amLODIPine  10 mg Oral Daily Jorden Person, MD     • argatroban  0 1-3 mcg/kg/min Intravenous Titrated Jorden Celestin MD 0 1 mcg/kg/min (11/25/22 1725)   • atorvastatin  40 mg Oral Daily With Dinner Jorden Person, MD     • clopidogrel  75 mg Oral Daily Jorden Person, MD     • doxazosin  2 mg Oral Daily Jodren Person, MD     • gabapentin  200 mg Oral BID Jorden Person, MD     • HYDROmorphone   Intravenous Continuous Jorden Person, MD     • HYDROmorphone  0 5 mg Intravenous Q3H PRN Jorden Person, MD     • insulin glargine  15 Units Subcutaneous HS Jorden Person, MD     • insulin lispro  1-6 Units Subcutaneous TID AC Jorden Person, MD     • metoclopramide  10 mg Intravenous Q6H PRN Jorden Person, MD     • naloxone  0 04 mg Intravenous Q1MIN PRN Jorden Person, MD     • naloxone  0 04 mg Intravenous Q3 min PRN Jorden Person, MD     • nystatin   Topical BID Jorden Person, MD     • ondansetron  4 mg Intravenous Q6H PRN Jorden Celestin MD     • oxyCODONE  5 mg Oral Q4H PRN Jorden Person, MD     • pantoprazole  40 mg Oral Early Morning Jorden Person, MD     • polyethylene glycol  17 g Oral Daily Carlos Clement MD     • QUEtiapine  25 mg Oral HS Jorden Person, MD     • senna  1 tablet Oral BID Carlos Clement MD     • sertraline  150 mg Oral Daily Jorden Person, MD     • sodium chloride  20 mL/hr Intravenous Continuous Jorden Celestin MD 20 mL/hr (11/25/22 1416)        Today, Patient Was Seen By: Duke Bucio DO

## 2022-11-26 NOTE — ASSESSMENT & PLAN NOTE
Possibly due to HIT  Resolved  Had reactive thrombocytosis, but also resolved today 11/25/2022    · Awaiting heparin antibody by serotonin release (performed 11/11)  · Heparin induced antibody elevated  · Changed from heparin to argatroban     Recent Labs     11/24/22  0527 11/25/22  0654 11/26/22  0339   * 364 296

## 2022-11-26 NOTE — ASSESSMENT & PLAN NOTE
Lab Results   Component Value Date    HGBA1C 9 8 (H) 10/25/2022     Recent Labs     11/25/22  1607 11/25/22  2138 11/26/22  0805 11/26/22  1114   POCGLU 125 166* 149* 144*     · Inpatient regimen: Sliding scale, Glargine 15U HS

## 2022-11-26 NOTE — PROGRESS NOTES
[address] [facility]  Progress Note - [facility] 1960, 65 y o  female MRN: [id number]  Unit/Bed#: E4 -01 Encounter: [id number]  Primary Care Provider: ALEX Reyes   Date and time admitted to hospital: 10/21/2022  7:58 PM    PAD (peripheral artery disease) [facility])  Assessment & Plan  65year old female former smoker w/HTN, HLD, uncontrolled type II DM (A1c 9 8), hx CVA, presenting with nonhealing extensive L heel ulceration and R hallux and 5th digit ulceration  Vascular surgery consulted for input  S/p multiple b/l endovascular interventions  JUSTIN  R hemispheric CVA    S/p L AKA 11/23/22 ([place])    Hgb 7 4  WBC 12 00      Recommendations:  - Bilateral tissue loss in the setting of uncontrolled type II DM and NYDIA on admission, now s/p multiple angiograms w/intervention    - Endovascular interventions, c/b atheroembolic event to the RLE and JUSTIN with R hemispheric CVA  - Now s/p L AKA on 11/23/22- preveena VAC in place, to be discontinued on 11/30/22  - Will continue to observe RLE to allow poor perfused superficial tissue loss to demarcate/slough to allow us to determine level of necessary RLE amputation as long as patient remains stable and not septic  Patient febrile yesterday to T-max of 101 1° however leukocytosis has resolved  - Medical management with statin, Plavix  - Medical management and glucose control per SLIM  Continue to trend Hgb  - If patient becomes septic or systemically ill (presumed to be secondary to RLE), would consider guillotine amputation of the RLE in the emergent setting  However will try to avoid this as long as possible  - Cont argatroban drip in setting of JUSTIN  - Continue PCA pain management          Subjective/Objective     Subjective:  Patient still reports having mild stump pain on the left  Also continues to have RLE pain with no sensation in the right foot      Objective:   Vitals: Blood pressure 164/90, pulse 55, temperature 100 °F (37 8 °C), temperature source Temporal, resp  rate 20, height 5' 4" (1 626 m), weight 90 4 kg (199 lb 4 7 oz), SpO2 96 %, not currently breastfeeding  ,Body mass index is 34 21 kg/m²  I/O       11/24 0701 11/25 0700 11/25 0701 11/26 0700 11/26 0701 11/27 0700    P  O        I V  (mL/kg) 3 (0) 4 2 (0)     IV Piggyback       Total Intake(mL/kg) 3 (0) 4 2 (0)     Urine (mL/kg/hr) 850 (0 4) 1550 (0 7)     Blood       Total Output 850 1550     Net -847 -[phone] 8                  Physical Exam:  Gen: NAD, Aox3, Comfortable in bed  Chest: Normal work of breathing, no respiratory distress  Abd: Soft, ND, NT  Ext:  LLE AKA stump is minimally tender  Koby Never in place  No signs of erythema or visible tissue breakdown  RLE with tissue loss to the foot  Right foot is dry and necrotic  Weeping noticed on posterior leg and calf  Skin: Warm, Dry, Intact      Lab, Imaging and other studies:   I have personally reviewed pertinent reports    , CBC with diff:   Lab Results   Component Value Date    WBC 9 52 11/26/2022    HGB 7 6 (L) 11/26/2022    HCT 24 2 (L) 11/26/2022    MCV 86 11/26/2022     11/26/2022    MCH 27 1 11/26/2022    MCHC 31 4 11/26/2022    RDW 14 7 11/26/2022    MPV 7 8 (L) 11/26/2022    NRBC 0 11/26/2022   , BMP/CMP:   Lab Results   Component Value Date    SODIUM 138 11/26/2022    K 3 1 (L) 11/26/2022     11/26/2022    CO2 29 11/26/2022    BUN 8 11/26/2022    CREATININE 0 75 11/26/2022    CALCIUM 7 5 (L) 11/26/2022    AST 45 11/26/2022    ALT 27 11/26/2022    ALKPHOS 424 (H) 11/26/2022    EGFR 86 11/26/2022     VTE Pharmacologic Prophylaxis:  Argatroban  VTE Mechanical Prophylaxis: sequential compression device        Scott Calvillo MD  11/26/2022  7:51 AM

## 2022-11-26 NOTE — ASSESSMENT & PLAN NOTE
65year old female is currently admitted due to a moderate to large volume left MCA territory stroke  · Repeat CT head showed an evolving acute infarct in right parietal-temporal-occipital lobes with questionable petechial cortical hemorrhage in right parietal lobe  · Discussed the findings above with Dr Tonya Collet  He recommended that we continue anticoagulation for now due to the clinical insignificance of the size and the risk of taking her off anticoagulation far outweighs the benefits off of it, not unless new contraindications arise    · Continue argatroban until all surgical interventions are completed

## 2022-11-26 NOTE — ASSESSMENT & PLAN NOTE
65year old female former smoker w/HTN, HLD, uncontrolled type II DM (A1c 9 8), hx CVA, presenting with nonhealing extensive L heel ulceration and R hallux and 5th digit ulceration  Vascular surgery consulted for input  S/p multiple b/l endovascular interventions  JUSTIN  R hemispheric CVA    S/p L AKA 11/23/22 ([place])    Hgb 7 4  WBC 12 00      Recommendations:  - Bilateral tissue loss in the setting of uncontrolled type II DM and NYDIA on admission, now s/p multiple angiograms w/intervention    - Endovascular interventions, c/b atheroembolic event to the RLE and JUSTIN with R hemispheric CVA  - Now s/p L AKA on 11/23/22- preveena VAC in place, to be discontinued on 11/30/22  - Will continue to observe RLE to allow poor perfused superficial tissue loss to demarcate/slough to allow us to determine level of necessary RLE amputation as long as patient remains stable and not septic  Patient febrile yesterday to T-max of 101 1° however leukocytosis has resolved  - Medical management with statin, Plavix  - Medical management and glucose control per SLIM  Continue to trend Hgb  - If patient becomes septic or systemically ill (presumed to be secondary to RLE), would consider guillotine amputation of the RLE in the emergent setting  However will try to avoid this as long as possible    - Cont argatroban drip in setting of JUSTIN  - Continue PCA pain management

## 2022-11-27 ENCOUNTER — APPOINTMENT (INPATIENT)
Dept: RADIOLOGY | Facility: HOSPITAL | Age: 62
DRG: 853 | End: 2022-11-27
Payer: COMMERCIAL

## 2022-11-27 PROBLEM — R50.9 FEBRILE: Status: ACTIVE | Noted: 2022-11-13

## 2022-11-27 LAB
ANION GAP SERPL CALCULATED.3IONS-SCNC: 6 MMOL/L (ref 4–13)
APTT PPP: 67 SECONDS (ref 23–37)
APTT PPP: 74 SECONDS (ref 23–37)
BUN SERPL-MCNC: 10 MG/DL (ref 5–25)
CALCIUM SERPL-MCNC: 7.9 MG/DL (ref 8.3–10.1)
CHLORIDE SERPL-SCNC: 105 MMOL/L (ref 96–108)
CO2 SERPL-SCNC: 29 MMOL/L (ref 21–32)
CREAT SERPL-MCNC: 0.7 MG/DL (ref 0.6–1.3)
ERYTHROCYTE [DISTWIDTH] IN BLOOD BY AUTOMATED COUNT: 14.6 % (ref 11.6–15.1)
GFR SERPL CREATININE-BSD FRML MDRD: 93 ML/MIN/1.73SQ M
GLUCOSE SERPL-MCNC: 107 MG/DL (ref 65–140)
GLUCOSE SERPL-MCNC: 116 MG/DL (ref 65–140)
GLUCOSE SERPL-MCNC: 119 MG/DL (ref 65–140)
GLUCOSE SERPL-MCNC: 166 MG/DL (ref 65–140)
HCT VFR BLD AUTO: 28.8 % (ref 34.8–46.1)
HGB BLD-MCNC: 9 G/DL (ref 11.5–15.4)
MCH RBC QN AUTO: 27.2 PG (ref 26.8–34.3)
MCHC RBC AUTO-ENTMCNC: 31.3 G/DL (ref 31.4–37.4)
MCV RBC AUTO: 87 FL (ref 82–98)
PLATELET # BLD AUTO: 269 THOUSANDS/UL (ref 149–390)
PMV BLD AUTO: 8.3 FL (ref 8.9–12.7)
POTASSIUM SERPL-SCNC: 3.7 MMOL/L (ref 3.5–5.3)
PROCALCITONIN SERPL-MCNC: 0.17 NG/ML
RBC # BLD AUTO: 3.31 MILLION/UL (ref 3.81–5.12)
SODIUM SERPL-SCNC: 140 MMOL/L (ref 135–147)
WBC # BLD AUTO: 7.2 THOUSAND/UL (ref 4.31–10.16)

## 2022-11-27 PROCEDURE — NC001 PR NO CHARGE: Performed by: SURGERY

## 2022-11-27 PROCEDURE — 80048 BASIC METABOLIC PNL TOTAL CA: CPT | Performed by: INTERNAL MEDICINE

## 2022-11-27 PROCEDURE — 85730 THROMBOPLASTIN TIME PARTIAL: CPT | Performed by: INTERNAL MEDICINE

## 2022-11-27 PROCEDURE — 85027 COMPLETE CBC AUTOMATED: CPT | Performed by: INTERNAL MEDICINE

## 2022-11-27 PROCEDURE — 82948 REAGENT STRIP/BLOOD GLUCOSE: CPT

## 2022-11-27 PROCEDURE — 99232 SBSQ HOSP IP/OBS MODERATE 35: CPT | Performed by: INTERNAL MEDICINE

## 2022-11-27 PROCEDURE — 84145 PROCALCITONIN (PCT): CPT | Performed by: INTERNAL MEDICINE

## 2022-11-27 PROCEDURE — 71045 X-RAY EXAM CHEST 1 VIEW: CPT

## 2022-11-27 RX ORDER — GABAPENTIN 100 MG/1
200 CAPSULE ORAL 3 TIMES DAILY
Status: DISCONTINUED | OUTPATIENT
Start: 2022-11-27 | End: 2022-12-02

## 2022-11-27 RX ORDER — IBUPROFEN 200 MG
200 TABLET ORAL ONCE
Status: COMPLETED | OUTPATIENT
Start: 2022-11-27 | End: 2022-11-27

## 2022-11-27 RX ADMIN — SERTRALINE HYDROCHLORIDE 150 MG: 100 TABLET ORAL at 08:28

## 2022-11-27 RX ADMIN — POLYETHYLENE GLYCOL 3350 17 G: 17 POWDER, FOR SOLUTION ORAL at 08:28

## 2022-11-27 RX ADMIN — ACETAMINOPHEN 650 MG: 325 TABLET, FILM COATED ORAL at 05:30

## 2022-11-27 RX ADMIN — GABAPENTIN 200 MG: 100 CAPSULE ORAL at 23:14

## 2022-11-27 RX ADMIN — IBUPROFEN 200 MG: 200 TABLET, FILM COATED ORAL at 19:41

## 2022-11-27 RX ADMIN — PANTOPRAZOLE SODIUM 40 MG: 40 TABLET, DELAYED RELEASE ORAL at 05:30

## 2022-11-27 RX ADMIN — GABAPENTIN 200 MG: 100 CAPSULE ORAL at 08:28

## 2022-11-27 RX ADMIN — ACETAMINOPHEN 650 MG: 325 TABLET, FILM COATED ORAL at 17:01

## 2022-11-27 RX ADMIN — ACETAMINOPHEN 650 MG: 325 TABLET, FILM COATED ORAL at 23:14

## 2022-11-27 RX ADMIN — GABAPENTIN 200 MG: 100 CAPSULE ORAL at 17:01

## 2022-11-27 RX ADMIN — CLOPIDOGREL BISULFATE 75 MG: 75 TABLET ORAL at 08:28

## 2022-11-27 RX ADMIN — SENNOSIDES 8.6 MG: 8.6 TABLET, FILM COATED ORAL at 17:01

## 2022-11-27 RX ADMIN — SENNOSIDES 8.6 MG: 8.6 TABLET, FILM COATED ORAL at 08:28

## 2022-11-27 RX ADMIN — ATORVASTATIN CALCIUM 40 MG: 40 TABLET, FILM COATED ORAL at 17:01

## 2022-11-27 RX ADMIN — ACETAMINOPHEN 650 MG: 325 TABLET, FILM COATED ORAL at 12:15

## 2022-11-27 RX ADMIN — NYSTATIN: 100000 POWDER TOPICAL at 08:29

## 2022-11-27 RX ADMIN — QUETIAPINE FUMARATE 25 MG: 25 TABLET ORAL at 23:16

## 2022-11-27 RX ADMIN — DOXAZOSIN 2 MG: 2 TABLET ORAL at 08:28

## 2022-11-27 RX ADMIN — NYSTATIN: 100000 POWDER TOPICAL at 17:02

## 2022-11-27 RX ADMIN — AMLODIPINE BESYLATE 10 MG: 10 TABLET ORAL at 08:28

## 2022-11-27 RX ADMIN — INSULIN GLARGINE 15 UNITS: 100 INJECTION, SOLUTION SUBCUTANEOUS at 23:14

## 2022-11-27 NOTE — ASSESSMENT & PLAN NOTE
S/p 4U pRBC  Will monitor for post-op bleeding  Hemoglobin of 4 8, likely erroneous given repeat bloodwork coming back normal  Hemoglobin is 9 0  Will monitor and transfuse as needed       Recent Labs     11/25/22  0654 11/25/22  1414 11/26/22  0339 11/27/22  0545   HGB 7 4* 7 9* 7 6* 9 0*

## 2022-11-27 NOTE — PROGRESS NOTES
Vanessa 49  Progress Note - [address] 1960, 65 y o  female MRN: [id number]  Unit/Bed#: E4 -01 Encounter: [id number]  Primary Care Provider: ALEX Winkler   Date and time admitted to hospital: 10/21/2022  7:58 PM    Febrile  Assessment & Plan  Intermittent fever  UA positive, but likely contaminant rather than an actual urinary tract infection  Possibly post op fever  · Appreciate ID recommendations  Monitor off antibiotics  · procal normal  · Fever low grade  · Encourage incentive spirometry  · Could be related to dry gangrene of right foot  · If she spikes a temp >101 will obtain blood cultures, cxr, and re consult ID       Thrombocytopenia ([address] )  Assessment & Plan  Possibly due to HIT  Resolved  Had reactive thrombocytosis, but also resolved today 11/25/2022  · Awaiting heparin antibody by serotonin release (performed 11/11)  · Heparin induced antibody elevated  · Changed from heparin to argatroban     Recent Labs     11/25/22  0654 11/26/22  0339 11/27/22  0545    [id number]         Diabetic ulcer of heel associated with diabetes mellitus due to underlying condition [facility])  Assessment & Plan  S/p left aka 11/23/2022  · Post-op wound management per vascular surgery  · Await next step in management from vascular surgery, currently planned for possible right BKA  TBD  · PCA pump in place as ordered by the surgical service  · Increase pain at stump site  Possible phantom pain  On gabapentin 200mg bid  Will increase to tid dosing and monitor  Acute on chronic anemia  Assessment & Plan  S/p 4U pRBC  Will monitor for post-op bleeding  Hemoglobin of 4 8, likely erroneous given repeat bloodwork coming back normal  Hemoglobin is 9 0  Will monitor and transfuse as needed       Recent Labs     11/25/22  0654 11/25/22  1414 11/26/22  0339 11/27/22  0545   HGB 7 4* 7 9* 7 6* 9 0*         PAD (peripheral artery disease) (HCC)  Assessment & Plan  PAD with multiple bilateral lower extremity endovascular interventions, c/b atheroembolic event to the RLE and JUSTIN with R hemispheric CVA  In the setting of, Uncontrolled diabetes, hypertension, hyperlipidemia  Lost to follow-up and poorly compliant to diabetic regimen  · Interventions: S/p abdominal aortogram, LLE SFA shockwave angioplasty/FATOU 10/31/22  S/p R femoral endarterectomy 11/7  S/p aspiration thrombectomy in right common iliac artery and left SFA  · Continue statin / Plavix  · S/p Left AKA 11/23/2022  · Maintain Argatroban (Not on a heparin drip due to concern for thrombocytopenia / HIT) for now since vascular service is considering right BKA sometime next week  Hypokalemia  Assessment & Plan  Likely due to GI losses  Resolved  · Continue repleting PRN    Recent Labs     11/25/22  0654 11/26/22  0339 11/27/22  0545   K 3 6 3 1* 3 7   MG 1 5* 1 7  --          Type 2 diabetes mellitus with hyperglycemia, with long-term current use of insulin [facility])  Assessment & Plan  Lab Results   Component Value Date    HGBA1C 9 8 (H) 10/25/2022     Recent Labs     11/26/22  1114 11/26/22  1555 11/26/22  2101 11/27/22  0757   POCGLU 144* 182* 158* 107     · Inpatient regimen: Sliding scale, Glargine 15U HS    Benign essential hypertension  Assessment & Plan  Above goal but likely related to pain  · Continue amlodipine, Cardura  · On pca pump for pain  · Increased gabapentin     Depression, recurrent ([facility] [address] )  Assessment & Plan  Evaluated by Psychiatry on this admission  · Continue Zoloft 150 mg daily / Quetiapine 25mg po Qhs    Acute respiratory failure with hypoxia ([facility] [address] )  Assessment & Plan  Echo on 11/07 shows 50% LVEF, no diastolic dysfunction  Postprocedural hypoxia  · Currently on 2L NC    * Stroke [facility])  Assessment & Plan  65year old female is currently admitted due to a moderate to large volume left MCA territory stroke     · Repeat CT head showed an evolving acute infarct in right parietal-temporal-occipital lobes with questionable petechial cortical hemorrhage in right parietal lobe  · Discussed the findings above with Dr Mane Chaparro  He recommended that we continue anticoagulation for now due to the clinical insignificance of the size and the risk of taking her off anticoagulation far outweighs the benefits off of it, not unless new contraindications arise  · Continue argatroban until all surgical interventions are completed      VTE Pharmacologic Prophylaxis: argatroban gtt  Education and Discussions with Family / Patient: called daughter and updated her    Time Spent for Care: 20 minutes  More than 50% of total time spent on counseling and coordination of care as described above  Current Length of Stay: 36 day(s)  Current Patient Status: Inpatient     Discharge Plan / Estimated Discharge Date: greater than 72 hours  Awaiting vascular decision on timing of right amputation   Code Status: Level 1 - Full Code      Subjective:   Pt seen and examined  She is having increased pain in her stump  She still has dilaudid pca  +bm  No f/c no cp no sob no n/v/d no abd pain  Objective:     Vitals:   Temp (24hrs), Av 3 °F (36 8 °C), Min:96 2 °F (35 7 °C), Max:99 4 °F (37 4 °C)    Temp:  [96 2 °F (35 7 °C)-99 4 °F (37 4 °C)] 99 2 °F (37 3 °C)  HR:  [82-86] 82  Resp:  [20] 20  BP: (147-167)/(62-75) 162/75  SpO2:  [96 %-97 %] 96 %  Body mass index is 34 51 kg/m²  Input and Output Summary (last 24 hours): Intake/Output Summary (Last 24 hours) at 2022 0943  Last data filed at 2022 0700  Gross per 24 hour   Intake 668 68 ml   Output 12 ml   Net 656 68 ml       Physical Exam:   Physical Exam  Constitutional:       Appearance: Normal appearance  HENT:      Head: Normocephalic and atraumatic  Eyes:      Extraocular Movements: Extraocular movements intact  Pupils: Pupils are equal, round, and reactive to light  Cardiovascular:      Rate and Rhythm: Normal rate and regular rhythm        Heart sounds: No murmur heard  No friction rub  No gallop  Pulmonary:      Effort: Pulmonary effort is normal  No respiratory distress  Breath sounds: Normal breath sounds  No wheezing, rhonchi or rales  Abdominal:      General: Bowel sounds are normal  There is no distension  Palpations: Abdomen is soft  Tenderness: There is no abdominal tenderness  There is no guarding or rebound  Skin:     Comments: Right foot gangrene with bandage c/d/I  Left stump with wound vac in place  Neurological:      Mental Status: She is alert and oriented to person, place, and time            Additional Data:     Labs:  Results from last 7 days   Lab Units 11/27/22  0545 11/26/22  0339   WBC Thousand/uL 7 20 9 52   HEMOGLOBIN g/dL 9 0* 7 6*   HEMATOCRIT % 28 8* 24 2*   PLATELETS Thousands/uL 269 296   NEUTROS PCT %  --  86*   LYMPHS PCT %  --  6*   MONOS PCT %  --  6   EOS PCT %  --  1     Results from last 7 days   Lab Units 11/27/22  0545 11/26/22  0339   SODIUM mmol/L 140 138   POTASSIUM mmol/L 3 7 3 1*   CHLORIDE mmol/L 105 101   CO2 mmol/L 29 29   BUN mg/dL 10 8   CREATININE mg/dL 0 70 0 75   ANION GAP mmol/L 6 8   CALCIUM mg/dL 7 9* 7 5*   ALBUMIN g/dL  --  1 1*   TOTAL BILIRUBIN mg/dL  --  0 34   ALK PHOS U/L  --  424*   ALT U/L  --  27   AST U/L  --  45   GLUCOSE RANDOM mg/dL 119 174*     Results from last 7 days   Lab Units 11/24/22  0527   INR  2 40*     Results from last 7 days   Lab Units 11/27/22  0757 11/26/22  2101 11/26/22  1555 11/26/22  1114 11/26/22  0805 11/25/22  2138 11/25/22  1607 11/25/22  1104 11/25/22  0730 11/24/22  2048 11/24/22  1552 11/24/22  1140   POC GLUCOSE mg/dl 107 158* 182* 144* 149* 166* 125 121 159* 119 113 117         Results from last 7 days   Lab Units 11/27/22  0545   PROCALCITONIN ng/ml 0 17     Recent Cultures (last 7 days):           Lines/Drains:  Invasive Devices     Peripherally Inserted Central Catheter Line  Duration           PICC Line 31/46/12 Right Basilic 8 days Peripheral Intravenous Line  Duration           Peripheral IV 11/23/22 Dorsal (posterior); Left;Proximal Forearm 3 days          Drain  Duration           Urethral Catheter Non-latex 18 Fr  10 days              Last 24 Hours Medication List:   Current Facility-Administered Medications   Medication Dose Route Frequency Provider Last Rate   • acetaminophen  650 mg Oral Q6H [address] Jimbo Sherman MD     • ALPRAZolam  0 25 mg Oral Daily PRN Jimbo Sherman MD     • amLODIPine  10 mg Oral Daily Jimbo Sherman MD     • argatroban  0 1-3 mcg/kg/min Intravenous Titrated Jimbo Sherman MD 0 1 mcg/kg/min (11/25/22 1725)   • atorvastatin  40 mg Oral Daily With Gisele Rojas MD     • clopidogrel  75 mg Oral Daily Jimbo Sherman MD     • doxazosin  2 mg Oral Daily Jimbo Sherman MD     • gabapentin  200 mg Oral TID Andrea Calix DO     • HYDROmorphone   Intravenous Continuous Jimbo Sherman MD     • HYDROmorphone  0 5 mg Intravenous Q3H PRN Jimbo Sherman MD     • insulin glargine  15 Units Subcutaneous HS Jimbo Sherman MD     • insulin lispro  1-6 Units Subcutaneous TID [facility] Jimbo Sherman MD     • metoclopramide  10 mg Intravenous Q6H PRN Jimbo Sherman MD     • naloxone  0 04 mg Intravenous Q1MIN PRN Jimbo Sherman MD     • naloxone  0 04 mg Intravenous Q3 min PRN Jimbo Sherman MD     • nystatin   Topical BID Jimbo Sherman MD     • ondansetron  4 mg Intravenous Q6H PRN Jimbo Sherman MD     • oxyCODONE  5 mg Oral Q4H PRN Jimbo Sherman MD     • pantoprazole  40 mg Oral Early Morning Jimbo Sherman MD     • polyethylene glycol  17 g Oral BID Mindi Dietz DO     • QUEtiapine  25 mg Oral HS Jimbo Sherman MD     • senna  1 tablet Oral BID Robert Carter MD     • sertraline  150 mg Oral Daily Jimbo Sherman MD     • sodium chloride  20 mL/hr Intravenous Continuous Jimbo Sherman MD 20 mL/hr (11/26/22 2134)        Today, Patient Was Seen By: Stefanie Huffman

## 2022-11-27 NOTE — PROGRESS NOTES
Vascular Surgery  Progress Note   Lionel Mayes 65 y o  female MRN: [id number]  Unit/Bed#: E4 -01 Encounter: [id number]    Assessment:  Lionel Mayes is a 65 y o  female with PAD who is s/p L AKA with Prevena on 22 with Dr Destiny Bustillos  Her course has been complicated by multiple angioframs and an atheroembolic event to RLE and R hemisphere CVA  She continues with poor perfusion to RLE and we are awaiting demarcation/slough to help determine the appropriate level for a future amputation  She remains on Argatroban 2/2 JUSTIN  Resolving leukocytosis and afebrile for past 24 hours  Plan:  - continue to observe RLE as long as she remains stable and non-septic  - if becomes septic, consider RLE guillotine amputation  - continue Argatroban drip for JUSTIN  - continue PCA and multimodal pain control  - continue statin, Plavix  - SLIM following for assistance with BG control  - continue Prevena vac through     Subjective/Objective     Subjective: Patient seen and examined at bedside, in no acute distress  No acute events overnight  Patient's pain is relatively well controlled  She notes some mild left stump pain and phantom pains  Right foot remains insensate on bottom, however, she has feeling on dorsal aspect and can wiggle her toes (though she has no perception that they are moving)  Denies nausea or vomiting  Passing gas and stool  Objective:     Vitals:Blood pressure 153/68, pulse 83, temperature 99 4 °F (37 4 °C), temperature source Oral, resp  rate 20, height 5' 4" (1 626 m), weight 90 4 kg (199 lb 4 7 oz), SpO2 96 %, not currently breastfeeding  ,Body mass index is 34 21 kg/m²       Temp (24hrs), Av °F (36 7 °C), Min:96 2 °F (35 7 °C), Max:100 °F (37 8 °C)  Current: Temperature: 99 4 °F (37 4 °C)      Intake/Output Summary (Last 24 hours) at 2022  Last data filed at 2022 192  Gross per 24 hour   Intake 437 8 ml   Output 800 ml   Net -362 2 ml       Invasive Devices Peripherally Inserted Central Catheter Line  Duration           PICC Line 03/81/07 Right Basilic 8 days          Peripheral Intravenous Line  Duration           Peripheral IV 11/23/22 Dorsal (posterior); Left;Proximal Forearm 2 days          Drain  Duration           Urethral Catheter Non-latex 18 Fr  10 days                Physical Exam:  General: No acute distress, alert and oriented  CV: Well perfused, regular rate and rhythm  Lungs: Normal work of breathing, no increased respiratory effort  Abdomen: Soft, non-tender, non-distended  MSK: left LE stump incision covered with Prevena, minimal serous output, surrounding area without erythema, tender to palpation, not able to flex at hip much 2/2 pain; RLE wounds appear stable as compared to previous images, no weeping areas, sensation intact on shin and calf approximately 2/3 down, some sensation on dorsal right foot, no sensation on plantar aspect, able to move 1st and 2nd right toes but no perception of them moving when she wiggles them  Extremities: No edema, clubbing or cyanosis  Skin: Warm, dry    Lab Results: Results: I have personally reviewed all pertinent laboratory/tests results  VTE Prophylaxis: SCDs and Argatroban    Zhang Amezquita MD  PGY-1, General Surgery

## 2022-11-27 NOTE — ASSESSMENT & PLAN NOTE
65year old female is currently admitted due to a moderate to large volume left MCA territory stroke  · Repeat CT head showed an evolving acute infarct in right parietal-temporal-occipital lobes with questionable petechial cortical hemorrhage in right parietal lobe  · Discussed the findings above with Dr Addi Rosales  He recommended that we continue anticoagulation for now due to the clinical insignificance of the size and the risk of taking her off anticoagulation far outweighs the benefits off of it, not unless new contraindications arise    · Continue argatroban until all surgical interventions are completed

## 2022-11-27 NOTE — ASSESSMENT & PLAN NOTE
Likely due to GI losses   Resolved  · Continue repleting PRN    Recent Labs     11/25/22  0654 11/26/22  0339 11/27/22  0545   K 3 6 3 1* 3 7   MG 1 5* 1 7  --

## 2022-11-27 NOTE — ASSESSMENT & PLAN NOTE
Lab Results   Component Value Date    HGBA1C 9 8 (H) 10/25/2022     Recent Labs     11/26/22  1114 11/26/22  1555 11/26/22  2101 11/27/22  0757   POCGLU 144* 182* 158* 107     · Inpatient regimen: Sliding scale, Glargine 15U HS

## 2022-11-27 NOTE — ASSESSMENT & PLAN NOTE
Above goal but likely related to pain  · Continue amlodipine, Cardura  · On pca pump for pain  · Increased gabapentin

## 2022-11-27 NOTE — ASSESSMENT & PLAN NOTE
Intermittent fever  UA positive, but likely contaminant rather than an actual urinary tract infection  Possibly post op fever  · Appreciate ID recommendations  Monitor off antibiotics  · procal normal  · Fever low grade     · Encourage incentive spirometry  · Could be related to dry gangrene of right foot  · If she spikes a temp >101 will obtain blood cultures, cxr, and re consult ID

## 2022-11-27 NOTE — ASSESSMENT & PLAN NOTE
Possibly due to HIT  Resolved  Had reactive thrombocytosis, but also resolved today 11/25/2022    · Awaiting heparin antibody by serotonin release (performed 11/11)  · Heparin induced antibody elevated  · Changed from heparin to argatroban     Recent Labs     11/25/22  0654 11/26/22  0339 11/27/22  0545    774 230

## 2022-11-27 NOTE — ASSESSMENT & PLAN NOTE
S/p left aka 11/23/2022  · Post-op wound management per vascular surgery  · Await next step in management from vascular surgery, currently planned for possible right BKA  TBD  · PCA pump in place as ordered by the surgical service  · Increase pain at stump site  Possible phantom pain  On gabapentin 200mg bid  Will increase to tid dosing and monitor

## 2022-11-27 NOTE — ASSESSMENT & PLAN NOTE
Echo on 11/07 shows 30% LVEF, no diastolic dysfunction   Postprocedural hypoxia  · Currently on 2L NC

## 2022-11-28 ENCOUNTER — TELEPHONE (OUTPATIENT)
Dept: VASCULAR SURGERY | Facility: CLINIC | Age: 62
End: 2022-11-28

## 2022-11-28 PROBLEM — D75.829 HIT (HEPARIN-INDUCED THROMBOCYTOPENIA): Status: ACTIVE | Noted: 2022-11-11

## 2022-11-28 LAB
ANION GAP SERPL CALCULATED.3IONS-SCNC: 6 MMOL/L (ref 4–13)
APTT PPP: 77 SECONDS (ref 23–37)
APTT PPP: 83 SECONDS (ref 23–37)
BUN SERPL-MCNC: 9 MG/DL (ref 5–25)
CALCIUM SERPL-MCNC: 7.7 MG/DL (ref 8.3–10.1)
CHLORIDE SERPL-SCNC: 105 MMOL/L (ref 96–108)
CO2 SERPL-SCNC: 29 MMOL/L (ref 21–32)
CREAT SERPL-MCNC: 0.67 MG/DL (ref 0.6–1.3)
ERYTHROCYTE [DISTWIDTH] IN BLOOD BY AUTOMATED COUNT: 14.8 % (ref 11.6–15.1)
GFR SERPL CREATININE-BSD FRML MDRD: 95 ML/MIN/1.73SQ M
GLUCOSE SERPL-MCNC: 103 MG/DL (ref 65–140)
GLUCOSE SERPL-MCNC: 114 MG/DL (ref 65–140)
GLUCOSE SERPL-MCNC: 89 MG/DL (ref 65–140)
GLUCOSE SERPL-MCNC: 91 MG/DL (ref 65–140)
GLUCOSE SERPL-MCNC: 99 MG/DL (ref 65–140)
HCT VFR BLD AUTO: 24.4 % (ref 34.8–46.1)
HGB BLD-MCNC: 7.5 G/DL (ref 11.5–15.4)
MCH RBC QN AUTO: 26.5 PG (ref 26.8–34.3)
MCHC RBC AUTO-ENTMCNC: 30.7 G/DL (ref 31.4–37.4)
MCV RBC AUTO: 86 FL (ref 82–98)
PLATELET # BLD AUTO: 327 THOUSANDS/UL (ref 149–390)
PMV BLD AUTO: 8.4 FL (ref 8.9–12.7)
POTASSIUM SERPL-SCNC: 3.1 MMOL/L (ref 3.5–5.3)
PROCALCITONIN SERPL-MCNC: 0.18 NG/ML
RBC # BLD AUTO: 2.83 MILLION/UL (ref 3.81–5.12)
SODIUM SERPL-SCNC: 140 MMOL/L (ref 135–147)
WBC # BLD AUTO: 11.07 THOUSAND/UL (ref 4.31–10.16)

## 2022-11-28 PROCEDURE — 82948 REAGENT STRIP/BLOOD GLUCOSE: CPT

## 2022-11-28 PROCEDURE — 85730 THROMBOPLASTIN TIME PARTIAL: CPT | Performed by: INTERNAL MEDICINE

## 2022-11-28 PROCEDURE — 94762 N-INVAS EAR/PLS OXIMTRY CONT: CPT

## 2022-11-28 PROCEDURE — 99233 SBSQ HOSP IP/OBS HIGH 50: CPT | Performed by: INTERNAL MEDICINE

## 2022-11-28 PROCEDURE — 85027 COMPLETE CBC AUTOMATED: CPT | Performed by: INTERNAL MEDICINE

## 2022-11-28 PROCEDURE — NC001 PR NO CHARGE: Performed by: SURGERY

## 2022-11-28 PROCEDURE — 80048 BASIC METABOLIC PNL TOTAL CA: CPT | Performed by: INTERNAL MEDICINE

## 2022-11-28 PROCEDURE — 84145 PROCALCITONIN (PCT): CPT | Performed by: INTERNAL MEDICINE

## 2022-11-28 RX ORDER — POTASSIUM CHLORIDE 20 MEQ/1
40 TABLET, EXTENDED RELEASE ORAL 2 TIMES DAILY
Status: COMPLETED | OUTPATIENT
Start: 2022-11-28 | End: 2022-11-29

## 2022-11-28 RX ORDER — FUROSEMIDE 20 MG/1
20 TABLET ORAL ONCE
Status: COMPLETED | OUTPATIENT
Start: 2022-11-28 | End: 2022-11-28

## 2022-11-28 RX ORDER — INSULIN GLARGINE 100 [IU]/ML
10 INJECTION, SOLUTION SUBCUTANEOUS
Status: DISCONTINUED | OUTPATIENT
Start: 2022-11-28 | End: 2022-12-06

## 2022-11-28 RX ADMIN — ACETAMINOPHEN 650 MG: 325 TABLET, FILM COATED ORAL at 17:13

## 2022-11-28 RX ADMIN — PANTOPRAZOLE SODIUM 40 MG: 40 TABLET, DELAYED RELEASE ORAL at 05:32

## 2022-11-28 RX ADMIN — CLOPIDOGREL BISULFATE 75 MG: 75 TABLET ORAL at 09:45

## 2022-11-28 RX ADMIN — SERTRALINE HYDROCHLORIDE 150 MG: 100 TABLET ORAL at 09:45

## 2022-11-28 RX ADMIN — POTASSIUM CHLORIDE 40 MEQ: 1500 TABLET, EXTENDED RELEASE ORAL at 18:24

## 2022-11-28 RX ADMIN — ACETAMINOPHEN 650 MG: 325 TABLET, FILM COATED ORAL at 12:50

## 2022-11-28 RX ADMIN — GABAPENTIN 200 MG: 100 CAPSULE ORAL at 21:49

## 2022-11-28 RX ADMIN — AMLODIPINE BESYLATE 10 MG: 10 TABLET ORAL at 09:45

## 2022-11-28 RX ADMIN — ATORVASTATIN CALCIUM 40 MG: 40 TABLET, FILM COATED ORAL at 17:13

## 2022-11-28 RX ADMIN — HYDROMORPHONE HYDROCHLORIDE: 10 INJECTION, SOLUTION INTRAMUSCULAR; INTRAVENOUS; SUBCUTANEOUS at 17:46

## 2022-11-28 RX ADMIN — GABAPENTIN 200 MG: 100 CAPSULE ORAL at 17:13

## 2022-11-28 RX ADMIN — QUETIAPINE FUMARATE 25 MG: 25 TABLET ORAL at 21:49

## 2022-11-28 RX ADMIN — HYDROMORPHONE HYDROCHLORIDE: 10 INJECTION, SOLUTION INTRAMUSCULAR; INTRAVENOUS; SUBCUTANEOUS at 16:15

## 2022-11-28 RX ADMIN — GABAPENTIN 200 MG: 100 CAPSULE ORAL at 09:45

## 2022-11-28 RX ADMIN — ACETAMINOPHEN 650 MG: 325 TABLET, FILM COATED ORAL at 05:32

## 2022-11-28 RX ADMIN — INSULIN GLARGINE 10 UNITS: 100 INJECTION, SOLUTION SUBCUTANEOUS at 21:49

## 2022-11-28 RX ADMIN — HYDROMORPHONE HYDROCHLORIDE: 10 INJECTION, SOLUTION INTRAMUSCULAR; INTRAVENOUS; SUBCUTANEOUS at 09:24

## 2022-11-28 RX ADMIN — NYSTATIN: 100000 POWDER TOPICAL at 09:47

## 2022-11-28 RX ADMIN — FUROSEMIDE 20 MG: 20 TABLET ORAL at 18:24

## 2022-11-28 RX ADMIN — NYSTATIN: 100000 POWDER TOPICAL at 17:15

## 2022-11-28 RX ADMIN — DOXAZOSIN 2 MG: 2 TABLET ORAL at 09:45

## 2022-11-28 NOTE — ASSESSMENT & PLAN NOTE
· Positive heparin antibody on 11/11/2020  · Ongoing argatroban until surgical interventions are done    Recent Labs     11/26/22  0339 11/27/22  0545 11/28/22  0532    [phone]

## 2022-11-28 NOTE — ASSESSMENT & PLAN NOTE
Monitor and transfuse as needed    Recent Labs     11/26/22  0339 11/27/22  0545 11/28/22  0532   HGB 7 6* 9 0* 7 5*

## 2022-11-28 NOTE — PROGRESS NOTES
[address] [facility]  [facility] 1960, 65 y [facility]  female MRN: [id number]  Unit/Bed#: E4 -01 Encounter: [id number]  Primary Care Provider: ALEX Campoverde   Date and time admitted to hospital: 10/21/2022  7:58 PM    * PAD (peripheral artery disease) [facility])  Assessment & Plan  · Initially admitted for nonhealing wound of the left lower extremity, requiring multiple procedures ultimately required left AKA on 11/23/2022  · Vascular surgery is monitoring the right lower extremity to determine the final line of demarcation for her eventual amputation  · Continue wound care per vascular surgery  · She has been on Dilaudid PCA since 11/23/2022 and her pain was not controlled as of today  · Pain regimen simplified/adjusted  (she was getting Dilaudid PCA, together with Dilaudid bolus and oxycodone p o  )  · Continue Dilaudid PCA only  Basal dose of 0 1 milligram/hour added due to inadequate pain control  · Discussed with her nurse to keep a close eye on her respiratory status and will place her on continuous pulse oximetry while on a basal dose to make sure she is adequately monitored  · Continue argatroban drip    Stroke [facility])  Assessment & Plan  · Noted to have change in mental status on 11/13/2022    · CT confirmed moderate right parietal lobe stroke, likely embolic in the setting of HIT  · Ongoing argatroban infusion  · Plan to continue argatroban until all surgical interventions are completed    HIT (heparin-induced thrombocytopenia)  Assessment & Plan  · Positive heparin antibody on 11/11/2020  · Ongoing argatroban until surgical interventions are done    Recent Labs     11/26/22  0339 11/27/22  0545 11/28/22  0532    269 327         Type 2 diabetes mellitus with hyperglycemia, with long-term current use of insulin [facility])  Assessment & Plan  Lab Results   Component Value Date    HGBA1C 9 8 (H) 10/25/2022     Recent Labs     11/27/22  2100 11/28/22  0744 22  1140 22  1618   POCGLU 166* 103 99 91     · Blood sugars are acceptable  · Continue Lantus 15 units at night with sliding scale    Acute on chronic anemia  Assessment & Plan  Monitor and transfuse as needed    Recent Labs     22  0339 22  0545 22  0532   HGB 7 6* 9 0* 7 5*         Benign essential hypertension  Assessment & Plan  Continue Norvasc 10 mg daily and Cardura 2 mg daily    Depression, recurrent ([address] )  Assessment & Plan  Evaluated by Psychiatry on this admission  · Continue Zoloft 150 mg daily / Quetiapine 25mg po Qhs    Acute respiratory failure with hypoxia ([address] )  Assessment & Plan  Chest x-ray on  showed mild pulmonary vascular congestion  She is still on oxygen at 2 L  Will give 1 dose of Lasix 20 mg  Start continuous pulse ox while on Dilaudid PCA     VTE Pharmacologic Prophylaxis:   Pharmacologic: Argatroban  Mechanical VTE Prophylaxis in Place: No    Patient Centered Rounds: I have performed bedside rounds with nursing staff today  Discussions with Specialists or Other Care Team Provider:  Hospital course reviewed in detail    Education and Discussions with Family / Patient:  Spoke with daughter over the phone and at bedside    Time Spent for Care: 40 minutes  More than 50% of total time spent on counseling and coordination of care as described above  Current Length of Stay: 37 day(s)    Current Patient Status: Inpatient   Certification Statement: The patient will continue to require additional inpatient hospital stay due to RAMAN STEVENSON, [place] T, stroke    Discharge Plan: Will likely need rehab    Code Status: Level 1 - Full Code      Subjective:   Seen and examined earlier during rounds  Still with pain  Dilaudid pump reviewed    Since 814 in the morning, she had 315 attempts and only 17 doses given    Objective:     Vitals:   Temp (24hrs), Av 6 °F (37 °C), Min:97 9 °F (36 6 °C), Max:100 9 °F (38 3 °C)    Temp:  [97 9 °F (36 6 °C)-100 9 °F (38 3 °C)] 98 3 °F (36 8 °C)  HR:  [] 102  Resp:  [20] 20  BP: (130-197)/(68-92) 130/90  SpO2:  [98 %-99 %] 99 %  Body mass index is 34 25 kg/m²  Input and Output Summary (last 24 hours): Intake/Output Summary (Last 24 hours) at 11/28/2022 1745  Last data filed at 11/28/2022 1701  Gross per 24 hour   Intake 1494 7 ml   Output 1770 ml   Net -275 3 ml       Physical Exam:     Physical Exam  Constitutional:       Appearance: She is ill-appearing  HENT:      Head: Normocephalic and atraumatic  Nose: No congestion or rhinorrhea  Eyes:      General: No scleral icterus  Cardiovascular:      Rate and Rhythm: Normal rate  Pulmonary:      Comments: Poor effort  Diminished at bases  No wheeze  Abdominal:      General: Abdomen is flat  Palpations: Abdomen is soft  Musculoskeletal:      Cervical back: Neck supple  Comments: Left AKA   Skin:     Comments: Discoloration/gangrene right foot   Neurological:      Mental Status: She is alert  Comments: Awake alert follows commands   Psychiatric:         Mood and Affect: Mood normal          Behavior: Behavior normal      Additional Data:     Labs:    Results from last 7 days   Lab Units 11/28/22  0532 11/27/22  0545 11/26/22  0339   WBC Thousand/uL 11 07*   < > 9 52   HEMOGLOBIN g/dL 7 5*   < > 7 6*   HEMATOCRIT % 24 4*   < > 24 2*   PLATELETS Thousands/uL 327   < > 296   NEUTROS PCT %  --   --  86*   LYMPHS PCT %  --   --  6*   MONOS PCT %  --   --  6   EOS PCT %  --   --  1    < > = values in this interval not displayed       Results from last 7 days   Lab Units 11/28/22 0532 11/27/22  0545 11/26/22  0339   SODIUM mmol/L 140   < > 138   POTASSIUM mmol/L 3 1*   < > 3 1*   CHLORIDE mmol/L 105   < > 101   CO2 mmol/L 29   < > 29   BUN mg/dL 9   < > 8   CREATININE mg/dL 0 67   < > 0 75   ANION GAP mmol/L 6   < > 8   CALCIUM mg/dL 7 7*   < > 7 5*   ALBUMIN g/dL  --   --  1 1*   TOTAL BILIRUBIN mg/dL  --   --  0 34   ALK PHOS U/L  --   --  424*   ALT U/L  -- --  27   AST U/L  --   --  45   GLUCOSE RANDOM mg/dL 114   < > 174*    < > = values in this interval not displayed  Results from last 7 days   Lab Units 11/24/22  0527   INR  2 40*     Results from last 7 days   Lab Units 11/28/22  1618 11/28/22  1140 11/28/22  0744 11/27/22  2100 11/27/22  1049 11/27/22  0757 11/26/22  2101 11/26/22  1555 11/26/22  1114 11/26/22  0805 11/25/22  2138 11/25/22  1607   POC GLUCOSE mg/dl 91 99 103 166* 116 107 158* 182* 144* 149* 166* 125         Results from last 7 days   Lab Units 11/28/22  0532 11/27/22  0545   PROCALCITONIN ng/ml 0 18 0 17           * I Have Reviewed All Lab Data Listed Above  * Additional Pertinent Lab Tests Reviewed:  All Labs Within Last 24 Hours Reviewed    Imaging:    Imaging Reports Reviewed Today Include:  Chest x-ray    Recent Cultures (last 7 days):           Last 24 Hours Medication List:   Current Facility-Administered Medications   Medication Dose Route Frequency Provider Last Rate   • acetaminophen  650 mg Oral Q6H [facility] Mónica Boyd MD     • ALPRAZolam  0 25 mg Oral Daily PRN Mónica Boyd MD     • amLODIPine  10 mg Oral Daily Mónica Boyd MD     • argatroban  0 1-3 mcg/kg/min Intravenous Titrated Mónica Boyd MD 0 1 mcg/kg/min (11/25/22 1725)   • atorvastatin  40 mg Oral Daily With Dinner Mónica Boyd MD     • clopidogrel  75 mg Oral Daily Mónica Boyd MD     • doxazosin  2 mg Oral Daily Mónica Boyd MD     • gabapentin  200 mg Oral TID Micah Doyle DO     • HYDROmorphone   Intravenous Continuous Mani Delgado MD     • insulin glargine  15 Units Subcutaneous HS Mónica Boyd MD     • insulin lispro  1-6 Units Subcutaneous TID [facility] Mónica Boyd MD     • metoclopramide  10 mg Intravenous Q6H PRN Mónica Boyd MD     • naloxone  0 04 mg Intravenous Q1MIN PRN Mónica Boyd MD     • naloxone  0 04 mg Intravenous Q3 min PRN Mónica Boyd MD     • nystatin   Topical BID Mónica Boyd MD     • ondansetron  4 mg Intravenous Q6H PRN Priya Cosme MD     • pantoprazole  40 mg Oral Early Morning Priya Cosme MD     • polyethylene glycol  17 g Oral BID Kwabena Olivier DO     • QUEtiapine  25 mg Oral HS Priya Cosme MD     • senna  1 tablet Oral BID Ирина Crowley MD     • sertraline  150 mg Oral Daily Priya Cosme MD     • sodium chloride  20 mL/hr Intravenous Continuous Priya Cosme MD 20 mL/hr (11/26/22 2134)        Today, Patient Was Seen By: Ozzie Sherman MD    ** Please Note: Dictation voice to text software may have been used in the creation of this document   **

## 2022-11-28 NOTE — QUICK NOTE
RN reports patient on 3LNC, "sounds a bit wet," not in respiratory distress, O2 70's on room air  3LNC reapplied    Progress note reviewed, patient diagnosed with acute hypoxic respiratory failure, ECHO 96%, no diastolic dysfunction, currently on 2LNC  · VS reviewed, patient has been on 2-3LNC  · Currently not requiring increased O2 from baseline  · CXR 11/13: Mild pulmonary edema and trace layering pleural effusions    No definite consolidation  · Will repeat CXR  · Encourage cough, deep breathing, IS

## 2022-11-28 NOTE — ASSESSMENT & PLAN NOTE
Chest x-ray on 11/27 showed mild pulmonary vascular congestion  She is still on oxygen at 2 L    Will give 1 dose of Lasix 20 mg  Start continuous pulse ox while on Dilaudid PCA

## 2022-11-28 NOTE — ASSESSMENT & PLAN NOTE
Lab Results   Component Value Date    HGBA1C 9 8 (H) 10/25/2022     Recent Labs     11/27/22  2100 11/28/22  0744 11/28/22  1140 11/28/22  1618   POCGLU 166* 103 99 91     · Blood sugars are acceptable    · Continue Lantus 15 units at night with sliding scale

## 2022-11-28 NOTE — ASSESSMENT & PLAN NOTE
· Noted to have change in mental status on 11/13/2022    · CT confirmed moderate right parietal lobe stroke, likely embolic in the setting of HIT  · Ongoing argatroban infusion  · Plan to continue argatroban until all surgical interventions are completed

## 2022-11-28 NOTE — TELEPHONE ENCOUNTER
From: Kristina Cushing <[email or web address]   Sent: Monday, November 28, 2022 9:56 AM  To: Ethan Powell <[email or web address]; [email or web address]  Cc: Johnathan Tirado <[email or web address]  Subject: RE: [place]     Thursday with Dr Claudean Husk after his 2 cases  Almeta Myke    From: Beverly Ashley  [email or web address]   Sent: Monday, November 28, 2022 8:42 AM  To: Vascular Surgery Schedulers [email or web address]  Cc: Johnathan Tirado <[email or web address]  Subject: Esdras Varghese     Good morning,    Can we please start looking for a time for the in-house patient above at [facility]? She will need a LLE amputation  aka possible shayna      Thanks   Shahid Frye

## 2022-11-28 NOTE — PROGRESS NOTES
Vascular Surgery  Progress Note   Oneda Life 65 y o  female MRN: [id number]  Unit/Bed#: E4 -01 Encounter: [id number]    Assessment:  65F with DM, HTN, HLD, h/o CVA presenting with bilateral CLTI (nonhealing L heel ulcer, R hallux and 5th digit ulceration underwent multiple endovascular interventions listed below followed by L AKA for non-salvageable foot  Hospital course complicated by atheroembolism with significant tissue loss and R hemispheric stroke and JUSTIN     10/31 R ALLISON BES, L SFA shockwave/FATOU   R CFA DCB/Atherectomy, SFA/pop FATOU, pop DCB  11/10 R ALLISON thrombectomy/BES, L SFA stent thrombectomy/SES, pop POBA   L AKA     Tmax 100 9 overnight  WBC 11 07 from 7 2  Hb 7 5 from 9  Plt 322      Plan:  Allow for further demarcation - currently no evidence of infection  Continue argatroban gtt for HTT   Continue pain control  Continue Plavix and Lipitor  Will take down Prevena vac 22  Remainder care per primary team  Vascular surgery following    Subjective/Objective     Subjective:   No acute events overnight  Complains of incisional pain along left stump site  Denies any chest pain, SOB, no parasthesias or weakness  Objective:     Vitals:Blood pressure 148/68, pulse 78, temperature 98 5 °F (36 9 °C), temperature source Temporal, resp  rate 20, height 5' 4" (1 626 m), weight 90 5 kg (199 lb 8 3 oz), SpO2 98 %, not currently breastfeeding  ,Body mass index is 34 25 kg/m²       Temp (24hrs), Av °F (37 2 °C), Min:98 °F (36 7 °C), Max:100 9 °F (38 3 °C)  Current: Temperature: 98 5 °F (36 9 °C)      Intake/Output Summary (Last 24 hours) at 2022 0718  Last data filed at 2022 0700  Gross per 24 hour   Intake 1490 9 ml   Output 1595 ml   Net -104 1 ml       Invasive Devices     Peripherally Inserted Central Catheter Line  Duration           PICC Line 58/ Right Basilic 9 days          Peripheral Intravenous Line  Duration           Peripheral IV 22 Dorsal (posterior); Left;Proximal Forearm 4 days          Drain  Duration           Urethral Catheter Non-latex 18 Fr  11 days              Physical Exam:    General: No acute distress  CV: Normal rate  Respiratory: Non-labored breathing  Abdominal: Soft  Extremities: Warm  Neurologic: Alert    Wound/Incision:  L AKA stump site with Prevena vac in place c/d/i  R foot with dry gangrene with dry escar and skin slouphing, leg and thigh wrapped with kerlix not taken down, there is extensive tissue loss of lower leg with schar as well as medial thigh  Unfortunately, this appears to be full thickness ischemic injury of the posterolateral leg  The thigh is partial thickness      Pulse exam:  Nonpalpable R pedal pulses

## 2022-11-28 NOTE — ASSESSMENT & PLAN NOTE
· Initially admitted for nonhealing wound of the left lower extremity, requiring multiple procedures ultimately required left AKA on 11/23/2022  · Vascular surgery is monitoring the right lower extremity to determine the final line of demarcation for her eventual amputation  · Continue wound care per vascular surgery  · She has been on Dilaudid PCA since 11/23/2022 and her pain was not controlled as of today  · Pain regimen simplified/adjusted  (she was getting Dilaudid PCA, together with Dilaudid bolus and oxycodone p o  )  · Continue Dilaudid PCA only  Basal dose of 0 1 milligram/hour added due to inadequate pain control    · Discussed with her nurse to keep a close eye on her respiratory status and will place her on continuous pulse oximetry while on a basal dose to make sure she is adequately monitored  · Continue argatroban drip

## 2022-11-29 LAB
ANION GAP SERPL CALCULATED.3IONS-SCNC: 7 MMOL/L (ref 4–13)
APTT PPP: 122 SECONDS (ref 23–37)
APTT PPP: 131 SECONDS (ref 23–37)
APTT PPP: 131 SECONDS (ref 23–37)
APTT PPP: 51 SECONDS (ref 23–37)
APTT PPP: 63 SECONDS (ref 23–37)
BASOPHILS # BLD AUTO: 0.05 THOUSANDS/ÂΜL (ref 0–0.1)
BASOPHILS NFR BLD AUTO: 0 % (ref 0–1)
BUN SERPL-MCNC: 6 MG/DL (ref 5–25)
CALCIUM SERPL-MCNC: 7.7 MG/DL (ref 8.3–10.1)
CHLORIDE SERPL-SCNC: 100 MMOL/L (ref 96–108)
CO2 SERPL-SCNC: 30 MMOL/L (ref 21–32)
CREAT SERPL-MCNC: 0.67 MG/DL (ref 0.6–1.3)
EOSINOPHIL # BLD AUTO: 0.06 THOUSAND/ÂΜL (ref 0–0.61)
EOSINOPHIL NFR BLD AUTO: 0 % (ref 0–6)
ERYTHROCYTE [DISTWIDTH] IN BLOOD BY AUTOMATED COUNT: 14.8 % (ref 11.6–15.1)
GFR SERPL CREATININE-BSD FRML MDRD: 95 ML/MIN/1.73SQ M
GLUCOSE SERPL-MCNC: 89 MG/DL (ref 65–140)
GLUCOSE SERPL-MCNC: 91 MG/DL (ref 65–140)
GLUCOSE SERPL-MCNC: 94 MG/DL (ref 65–140)
GLUCOSE SERPL-MCNC: 96 MG/DL (ref 65–140)
GLUCOSE SERPL-MCNC: 98 MG/DL (ref 65–140)
GLUCOSE SERPL-MCNC: 99 MG/DL (ref 65–140)
HCT VFR BLD AUTO: 23.8 % (ref 34.8–46.1)
HGB BLD-MCNC: 7.6 G/DL (ref 11.5–15.4)
IMM GRANULOCYTES # BLD AUTO: 0.13 THOUSAND/UL (ref 0–0.2)
IMM GRANULOCYTES NFR BLD AUTO: 1 % (ref 0–2)
LYMPHOCYTES # BLD AUTO: 0.75 THOUSANDS/ÂΜL (ref 0.6–4.47)
LYMPHOCYTES NFR BLD AUTO: 5 % (ref 14–44)
MCH RBC QN AUTO: 27 PG (ref 26.8–34.3)
MCHC RBC AUTO-ENTMCNC: 31.9 G/DL (ref 31.4–37.4)
MCV RBC AUTO: 84 FL (ref 82–98)
MONOCYTES # BLD AUTO: 0.87 THOUSAND/ÂΜL (ref 0.17–1.22)
MONOCYTES NFR BLD AUTO: 6 % (ref 4–12)
NEUTROPHILS # BLD AUTO: 12.24 THOUSANDS/ÂΜL (ref 1.85–7.62)
NEUTS SEG NFR BLD AUTO: 88 % (ref 43–75)
NRBC BLD AUTO-RTO: 0 /100 WBCS
PLATELET # BLD AUTO: 320 THOUSANDS/UL (ref 149–390)
PMV BLD AUTO: 8.4 FL (ref 8.9–12.7)
POTASSIUM SERPL-SCNC: 3 MMOL/L (ref 3.5–5.3)
RBC # BLD AUTO: 2.82 MILLION/UL (ref 3.81–5.12)
SODIUM SERPL-SCNC: 137 MMOL/L (ref 135–147)
SRA .2 IU/ML UFH SER-ACNC: 75 % (ref 0–20)
SRA 100IU/ML UFH SER-ACNC: 2 % (ref 0–20)
SRA UFH SER-IMP: ABNORMAL
WBC # BLD AUTO: 14.1 THOUSAND/UL (ref 4.31–10.16)

## 2022-11-29 PROCEDURE — 83540 ASSAY OF IRON: CPT | Performed by: NURSE PRACTITIONER

## 2022-11-29 PROCEDURE — 85730 THROMBOPLASTIN TIME PARTIAL: CPT | Performed by: INTERNAL MEDICINE

## 2022-11-29 PROCEDURE — 85025 COMPLETE CBC W/AUTO DIFF WBC: CPT | Performed by: INTERNAL MEDICINE

## 2022-11-29 PROCEDURE — 80048 BASIC METABOLIC PNL TOTAL CA: CPT | Performed by: INTERNAL MEDICINE

## 2022-11-29 PROCEDURE — 83550 IRON BINDING TEST: CPT | Performed by: NURSE PRACTITIONER

## 2022-11-29 PROCEDURE — 99232 SBSQ HOSP IP/OBS MODERATE 35: CPT | Performed by: INTERNAL MEDICINE

## 2022-11-29 PROCEDURE — 82728 ASSAY OF FERRITIN: CPT | Performed by: NURSE PRACTITIONER

## 2022-11-29 PROCEDURE — 99024 POSTOP FOLLOW-UP VISIT: CPT | Performed by: SURGERY

## 2022-11-29 PROCEDURE — 82948 REAGENT STRIP/BLOOD GLUCOSE: CPT

## 2022-11-29 RX ADMIN — ACETAMINOPHEN 650 MG: 325 TABLET, FILM COATED ORAL at 12:45

## 2022-11-29 RX ADMIN — NYSTATIN: 100000 POWDER TOPICAL at 09:18

## 2022-11-29 RX ADMIN — SENNOSIDES 8.6 MG: 8.6 TABLET, FILM COATED ORAL at 09:17

## 2022-11-29 RX ADMIN — POTASSIUM CHLORIDE 40 MEQ: 1500 TABLET, EXTENDED RELEASE ORAL at 09:17

## 2022-11-29 RX ADMIN — CLOPIDOGREL BISULFATE 75 MG: 75 TABLET ORAL at 09:17

## 2022-11-29 RX ADMIN — INSULIN GLARGINE 10 UNITS: 100 INJECTION, SOLUTION SUBCUTANEOUS at 21:32

## 2022-11-29 RX ADMIN — ARGATROBAN 0.1 MCG/KG/MIN: 50 INJECTION, SOLUTION INTRAVENOUS at 06:13

## 2022-11-29 RX ADMIN — GABAPENTIN 200 MG: 100 CAPSULE ORAL at 09:17

## 2022-11-29 RX ADMIN — PANTOPRAZOLE SODIUM 40 MG: 40 TABLET, DELAYED RELEASE ORAL at 05:55

## 2022-11-29 RX ADMIN — DOXAZOSIN 2 MG: 2 TABLET ORAL at 09:19

## 2022-11-29 RX ADMIN — ATORVASTATIN CALCIUM 40 MG: 40 TABLET, FILM COATED ORAL at 17:31

## 2022-11-29 RX ADMIN — GABAPENTIN 200 MG: 100 CAPSULE ORAL at 17:31

## 2022-11-29 RX ADMIN — ACETAMINOPHEN 650 MG: 325 TABLET, FILM COATED ORAL at 00:28

## 2022-11-29 RX ADMIN — GABAPENTIN 200 MG: 100 CAPSULE ORAL at 21:32

## 2022-11-29 RX ADMIN — QUETIAPINE FUMARATE 25 MG: 25 TABLET ORAL at 21:32

## 2022-11-29 RX ADMIN — SERTRALINE HYDROCHLORIDE 150 MG: 100 TABLET ORAL at 09:17

## 2022-11-29 RX ADMIN — ACETAMINOPHEN 650 MG: 325 TABLET, FILM COATED ORAL at 05:55

## 2022-11-29 RX ADMIN — AMLODIPINE BESYLATE 10 MG: 10 TABLET ORAL at 09:17

## 2022-11-29 RX ADMIN — ACETAMINOPHEN 650 MG: 325 TABLET, FILM COATED ORAL at 17:30

## 2022-11-29 NOTE — ASSESSMENT & PLAN NOTE
Lab Results   Component Value Date    HGBA1C 9 8 (H) 10/25/2022     Recent Labs     11/29/22  0051 11/29/22  0759 11/29/22  1139 11/29/22  1626   POCGLU 94 99 98 91     · Blood sugars are acceptable    · Continue Lantus 15 units at night with sliding scale

## 2022-11-29 NOTE — PROGRESS NOTES
Vascular Surgery  Progress Note   Sukhi Sons 65 y o  female MRN: [id number]  Unit/Bed#: E4 -01 Encounter: [id number]    Assessment:  65F with DM, HTN, HLD, h/o CVA presenting with bilateral CLTI (nonhealing L heel ulcer, R hallux and 5th digit ulceration underwent multiple endovascular interventions listed below followed by L AKA for non-salvageable foot  Hospital course complicated by atheroembolism with significant tissue loss and R hemispheric stroke and JUSTIN     10/31 R ALLISON BES, L SFA shockwave/FATOU   R CFA DCB/Atherectomy, SFA/pop FATOU, pop DCB  11/10 R ALLISON thrombectomy/BES, L SFA stent thrombectomy/SES, pop POBA   L AKA     Afebrile overnight  AM labs pending      Plan:  Plan for OR Thursday for R AKA  Continue argatroban gtt for HTT   Continue pain control  Continue Plavix and Lipitor  Will take down Prevena vac 22  Remainder care per primary team  Vascular surgery following    Subjective/Objective     Subjective:   No acute events overnight  No changes, unchanged right foot pain and left stump incisional sitee  Denies any chest pain, SOB, no parasthesias or weakness  Objective:     Vitals:Blood pressure 168/70, pulse 98, temperature 98 2 °F (36 8 °C), temperature source Temporal, resp  rate 20, height 5' 4" (1 626 m), weight 90 2 kg (198 lb 13 7 oz), SpO2 96 %, not currently breastfeeding  ,Body mass index is 34 13 kg/m²       Temp (24hrs), Av 3 °F (36 8 °C), Min:97 9 °F (36 6 °C), Max:99 °F (37 2 °C)  Current: Temperature: 98 2 °F (36 8 °C)      Intake/Output Summary (Last 24 hours) at 2022 0714  Last data filed at 2022 0656  Gross per 24 hour   Intake 764 47 ml   Output 1625 ml   Net -860 53 ml       Invasive Devices     Peripherally Inserted Central Catheter Line  Duration           PICC Line 86/58/88 Right Basilic 10 days          Drain  Duration           Urethral Catheter Non-latex 18 Fr  12 days              Physical Exam:    General: No acute distress  CV: Normal rate  Respiratory: Non-labored breathing  Abdominal: Soft  Extremities: Warm  Neurologic: Alert    Wound/Incision:  L AKA stump site with Prevena vac in place c/d/i  R foot with dry gangrene with dry escar and skin slouphing, leg and thigh wrapped with kerlix not taken down, there is extensive tissue loss of lower leg with schar as well as medial thigh  Unfortunately, this appears to be full thickness ischemic injury of the posterolateral leg  The thigh is partial thickness      Pulse exam:  Nonpalpable R pedal pulses

## 2022-11-29 NOTE — ASSESSMENT & PLAN NOTE
Monitor and transfuse as needed    Recent Labs     11/27/22  0545 11/28/22  0532 11/29/22  0913   HGB 9 0* 7 5* 7 6*

## 2022-11-29 NOTE — ASSESSMENT & PLAN NOTE
Evaluated by Psychiatry on this admission  · Continue Zoloft 150 mg daily / Quetiapine 25mg po Qhs 93

## 2022-11-29 NOTE — ASSESSMENT & PLAN NOTE
Chest x-ray on 11/27 showed mild pulmonary vascular congestion  Suspect atelectasis, hypoventilation and being sedentary contributing to her hypoxemia  Lasix 20 mg given 11/20/2022  Monitor with continuous pulse ox while on Dilaudid PCA

## 2022-11-29 NOTE — PROGRESS NOTES
[facility]  [facility] Note - [facility] 1960, 65 y o  female MRN: [id number]  Unit/Bed#: E4 -01 Encounter: [id number]  Primary Care Provider: ALEX Omer   Date and time admitted to hospital: 10/21/2022  7:58 PM    * PAD (peripheral artery disease) [facility])  Assessment & Plan  · Initially admitted for nonhealing wound of the left lower extremity, requiring multiple procedures ultimately required left AKA on 11/23/2022  · For right AKA on Thursday 12/01/2022 per vascular surgery  · Continue wound care per vascular surgery  · Pain is better today after she was started on basal dose of Dilaudid 0 1 mg every hour on her PCA  · Continue close monitoring with continuous pulse oximetry and neurochecks  · Continue argatroban drip    Stroke [facility])  Assessment & Plan  · Noted to have change in mental status on 11/13/2022  · CT confirmed moderate right parietal lobe stroke, likely embolic in the setting of HIT  · Ongoing argatroban infusion  · Plan to continue argatroban until all surgical interventions are completed    HIT (heparin-induced thrombocytopenia)  Assessment & Plan  · Positive heparin antibody on 11/11/2020  · Ongoing argatroban until final surgery on Thursday  · Transition to oral anticoagulants once all surgical interventions are done    Recent Labs     11/27/22  0545 11/28/22  0532 11/29/22  0913    327 320         Type 2 diabetes mellitus with hyperglycemia, with long-term current use of insulin [facility])  Assessment & Plan  Lab Results   Component Value Date    HGBA1C 9 8 (H) 10/25/2022     Recent Labs     11/29/22  0051 11/29/22  0759 11/29/22  1139 11/29/22  1626   POCGLU 94 99 98 91     · Blood sugars are acceptable    · Continue Lantus 15 units at night with sliding scale    Acute on chronic anemia  Assessment & Plan  Monitor and transfuse as needed    Recent Labs     11/27/22  0545 11/28/22  0532 11/29/22  0913   HGB 9 0* 7 5* 7 6*         Benign essential hypertension  Assessment & Plan  Continue Norvasc 10 mg daily and Cardura 2 mg daily    Depression, recurrent ([facility] [address] )  Assessment & Plan  Evaluated by Psychiatry on this admission  · Continue Zoloft 150 mg daily / Quetiapine 25mg po Qhs    Acute respiratory failure with hypoxia ([facility] [address] )  Assessment & Plan  Chest x-ray on  showed mild pulmonary vascular congestion  Suspect atelectasis, hypoventilation and being sedentary contributing to her hypoxemia  Lasix 20 mg given 2022  Monitor with continuous pulse ox while on Dilaudid PCA         VTE Pharmacologic Prophylaxis:   Pharmacologic: Argatroban  Mechanical VTE Prophylaxis in Place: No    Patient Centered Rounds: I have performed bedside rounds with nursing staff today  Discussions with Specialists or Other Care Team Provider:  Case management    Education and Discussions with Family / Patient:  Discussed with daughter Johnathan Mathis gave update    Time Spent for Care: 20 minutes  More than 50% of total time spent on counseling and coordination of care as described above  Current Length of Stay: 38 day(s)    Current Patient Status: Inpatient   Certification Statement: The patient will continue to require additional inpatient hospital stay due to PAD    Discharge Plan:  Likely short-term rehab    Code Status: Level 1 - Full Code      Subjective:   Pain is much better since she was started on a basal dose of Dilaudid on her PCA  No events overnight per nurse  She was placed on continuous pulse oximetry while on the Dilaudid PCA for safety  She admits feeling scared about her upcoming surgery  She asked what would happen if her leg was left there  I told her that gangrene and infection will set in and that she will end up sicker      Objective:     Vitals:   Temp (24hrs), Av 2 °F (36 8 °C), Min:97 5 °F (36 4 °C), Max:99 °F (37 2 °C)    Temp:  [97 5 °F (36 4 °C)-99 °F (37 2 °C)] 98 3 °F (36 8 °C)  HR:  [] 93  Resp:  [18-20] 20  BP: (144-196)/(58-83) 196/83  SpO2:  [96 %-99 %] 98 %  Body mass index is 34 13 kg/m²  Input and Output Summary (last 24 hours): Intake/Output Summary (Last 24 hours) at 11/29/2022 1650  Last data filed at 11/29/2022 1446  Gross per 24 hour   Intake 1245 9 ml   Output 2950 ml   Net -1704 1 ml       Physical Exam:     Physical Exam  Constitutional:       Appearance: She is obese  She is ill-appearing  HENT:      Head: Normocephalic and atraumatic  Nose: No congestion or rhinorrhea  Eyes:      General: No scleral icterus  Cardiovascular:      Rate and Rhythm: Normal rate and regular rhythm  Pulmonary:      Effort: Pulmonary effort is normal  No respiratory distress  Breath sounds: No wheezing or rales  Abdominal:      General: Abdomen is flat  There is no distension  Palpations: Abdomen is soft  Musculoskeletal:      Cervical back: Neck supple  Right lower leg: Edema present  Skin:     Comments: Right foot gangrene   Neurological:      Mental Status: She is alert and oriented to person, place, and time     Psychiatric:         Mood and Affect: Mood normal          Behavior: Behavior normal          Additional Data:     Labs:    Results from last 7 days   Lab Units 11/29/22  0913   WBC Thousand/uL 14 10*   HEMOGLOBIN g/dL 7 6*   HEMATOCRIT % 23 8*   PLATELETS Thousands/uL 320   NEUTROS PCT % 88*   LYMPHS PCT % 5*   MONOS PCT % 6   EOS PCT % 0     Results from last 7 days   Lab Units 11/29/22  0614 11/27/22  0545 11/26/22  0339   SODIUM mmol/L 137   < > 138   POTASSIUM mmol/L 3 0*   < > 3 1*   CHLORIDE mmol/L 100   < > 101   CO2 mmol/L 30   < > 29   BUN mg/dL 6   < > 8   CREATININE mg/dL 0 67   < > 0 75   ANION GAP mmol/L 7   < > 8   CALCIUM mg/dL 7 7*   < > 7 5*   ALBUMIN g/dL  --   --  1 1*   TOTAL BILIRUBIN mg/dL  --   --  0 34   ALK PHOS U/L  --   --  424*   ALT U/L  --   --  27   AST U/L  --   --  45   GLUCOSE RANDOM mg/dL 89   < > 174*    < > = values in this interval not displayed  Results from last 7 days   Lab Units 11/24/22  0527   INR  2 40*     Results from last 7 days   Lab Units 11/29/22  1626 11/29/22  1139 11/29/22  0759 11/29/22  0051 11/28/22  2055 11/28/22  1618 11/28/22  1140 11/28/22  0744 11/27/22  2100 11/27/22  1049 11/27/22  0757 11/26/22  2101   POC GLUCOSE mg/dl 91 98 99 94 89 91 99 103 166* 116 107 158*         Results from last 7 days   Lab Units 11/28/22  0532 11/27/22  0545   PROCALCITONIN ng/ml 0 18 0 17           * I Have Reviewed All Lab Data Listed Above  * Additional Pertinent Lab Tests Reviewed:  All Labs Within Last 24 Hours Reviewed    Imaging:    Imaging Reports Reviewed Today Include:  None    Recent Cultures (last 7 days):           Last 24 Hours Medication List:   Current Facility-Administered Medications   Medication Dose Route Frequency Provider Last Rate   • acetaminophen  650 mg Oral Q6H [facility] Liberal Linnea Carroll MD     • ALPRAZolam  0 25 mg Oral Daily PRN Linnea Carroll MD     • amLODIPine  10 mg Oral Daily Linnea Carroll MD     • argatroban  0 1-3 mcg/kg/min Intravenous Titrated Linnea Carroll MD 0 05 mcg/kg/min (11/29/22 1413)   • atorvastatin  40 mg Oral Daily With Christianne Ruiz MD     • clopidogrel  75 mg Oral Daily Linnea Carroll MD     • doxazosin  2 mg Oral Daily Linnea Carroll MD     • gabapentin  200 mg Oral TID Estefania Merida DO     • HYDROmorphone   Intravenous Continuous Yolanda Mckenzie MD     • insulin glargine  10 Units Subcutaneous HS Sugey Wiseman PA-C     • insulin lispro  1-6 Units Subcutaneous TID AC Linnea Carroll MD     • metoclopramide  10 mg Intravenous Q6H PRN Linnea Carroll MD     • naloxone  0 04 mg Intravenous Q1MIN PRN Linnea Carroll MD     • naloxone  0 04 mg Intravenous Q3 min PRN Linnea Carroll MD     • nystatin   Topical BID Linnea Carroll MD     • ondansetron  4 mg Intravenous Q6H PRN Linnea Carroll MD     • pantoprazole  40 mg Oral Early Morning Linnea Carroll MD     • polyethylene glycol  17 g Oral BID Doris Scott DO     • QUEtiapine  25 mg Oral HS Rizwana Haas MD     • senna  1 tablet Oral BID Umesh Baltazar MD     • sertraline  150 mg Oral Daily Rizwana Haas MD     • sodium chloride  20 mL/hr Intravenous Continuous Rizwana Haas MD 20 mL/hr (11/26/22 2134)        Today, Patient Was Seen By: Hema Russell MD    ** Please Note: Dictation voice to text software may have been used in the creation of this document   **

## 2022-11-29 NOTE — ASSESSMENT & PLAN NOTE
· Positive heparin antibody on 11/11/2020  · Ongoing argatroban until final surgery on Thursday  · Transition to oral anticoagulants once all surgical interventions are done    Recent Labs     11/27/22  0545 11/28/22  0532 11/29/22  0913    [phone]

## 2022-11-29 NOTE — ASSESSMENT & PLAN NOTE
· Initially admitted for nonhealing wound of the left lower extremity, requiring multiple procedures ultimately required left AKA on 11/23/2022  · For right AKA on Thursday 12/01/2022 per vascular surgery    · Continue wound care per vascular surgery  · Pain is better today after she was started on basal dose of Dilaudid 0 1 mg every hour on her PCA  · Continue close monitoring with continuous pulse oximetry and neurochecks  · Continue argatroban drip

## 2022-11-29 NOTE — UTILIZATION REVIEW
Continued Stay Review    Date: 11/29/2022                         Current Patient Class: IP Current Level of Care: MS    HPI:62 y o  female initially admitted on 10/22 with CVA presenting with bilateral CLTI (nonhealing L heel ulcer, R hallux and 5th digit ulceration underwent multiple endovascular interventions listed below followed by L AKA for non-salvageable foot, R hemispheric stroke and JUSTIN  Assessment/Plan:   Remains intermittently HTN, on 3L NC oxygen  She is on Argatroban drip for JUSTIN and Dilaudid drip for ongoing analgesia for uncontrolled pain with other analgesia  Feels comfortable today, IV fluids  Last fever was 11/27  She has leukocytosis, decreased H/H - hgb 7 6, low K and calcium  Plan is for OR on 12/1for R AKA  Plan to take down Ruther Score today  On exam she has no R pedal pulses  She is on continuous pulse ox  Had 1 dose IV lasix 20 mg 11/28 for mild pulm vascular congestion on xray  Wound/Incision:  L AKA stump site with Prevena vac in place c/d/i  R foot with dry gangrene with dry escar and skin slouphing, leg and thigh wrapped with kerlix not taken down, there is extensive tissue loss of lower leg with schar as well as medial thigh  Unfortunately, this appears to be full thickness ischemic injury of the posterolateral leg  The thigh is partial thickness      Vital Signs:   11/29/22 1054 98 1 °F (36 7 °C) 78 20 144/58 83 98 % 32 3 L/min Nasal cannula Lying   11/29/22 0800 97 5 °F (36 4 °C) 82 20 188/74 Abnormal  107 99 % 32 3 L/min Nasal cannula Lying   11/29/22 0242 -- -- -- 168/70 -- -- -- -- -- --   11/29/22 0226 -- -- -- 172/67 Abnormal  108 -- -- -- -- --   11/29/22 0014 98 2 °F (36 8 °C) 98 20 178/67 Abnormal  94 96 % 32 3 L/min Nasal cannula Lying   11/28/22 1936 99 °F (37 2 °C) 101 18 170/70 96 96 % 32 3 L/min Nasal cannula Lying   11/28/22 1500 98 3 °F (36 8 °C) 102 20 130/90 -- 99 % -- -- Nasal cannula Lying   11/28/22 1300 97 9 °F (36 6 °C) 108 Abnormal  20 169/75 -- 99 % -- -- Nasal cannula Lying   11/28/22 0745 97 9 °F (36 6 °C) 96 20 167/80 -- 99 % -- -- -- Lying   11/28/22 0307 98 5 °F (36 9 °C) 78 20 148/68 -- 98 % 32 3 L/min Nasal cannula Lying   11/27/22 2325 98 1 °F (36 7 °C) 81 20 150/76 118 98 % 28 2 L/min Nasal cannula Lying   11/27/22 1924 -- -- -- 158/92 -- -- -- -- -- --   11/27/22 1900 100 9 °F (38 3 °C) Abnormal  108 Abnormal  -- 197/82 Abnormal  -- -- -- -- -- Lying   11/27/22 1448 99 3 °F (37 4 °C) 93 18 193/80 Abnormal  -- 96 % 28 2 L/min Nasal cannula Lying   11/27/22 1118 98 °F (36 7 °C) 85 18 161/83 -- 96 % 28 2 L/min Nasal cannula Lying   11/27/22 0731 99 2 °F (37 3 °C) 82 20 162/75 -- 96 % 28 2 L/min Nasal cannula Lying   11/27/22 0300 98 6 °F (37 °C) 82 20 152/72 -- --  28 2 L/min Nasal cannula Lying       Pertinent Labs/Diagnostic Results:       Results from last 7 days   Lab Units 11/29/22  0913 11/28/22  0532 11/27/22  0545 11/26/22  0339 11/25/22  1414 11/25/22  0654   WBC Thousand/uL 14 10* 11 07* 7 20 9 52  --  12 00*   HEMOGLOBIN g/dL 7 6* 7 5* 9 0* 7 6* 7 9* 7 4*   HEMATOCRIT % 23 8* 24 4* 28 8* 24 2* 25 5* 24 7*   PLATELETS Thousands/uL 320 327 269 296  --  364   NEUTROS ABS Thousands/µL 12 24*  --   --  8 25*  --  10 44*         Results from last 7 days   Lab Units 11/29/22  0614 11/28/22  0532 11/27/22  0545 11/26/22  0339 11/25/22  0654 11/24/22  0527 11/23/22  0504   SODIUM mmol/L 137 140 140 138 140   < > 143   POTASSIUM mmol/L 3 0* 3 1* 3 7 3 1* 3 6   < > 3 3*   CHLORIDE mmol/L 100 105 105 101 103   < > 105   CO2 mmol/L 30 29 29 29 30   < > 29   ANION GAP mmol/L 7 6 6 8 7   < > 9   BUN mg/dL 6 9 10 8 10   < > 7   CREATININE mg/dL 0 67 0 67 0 70 0 75 0 80   < > 0 80   EGFR ml/min/1 73sq m 95 95 93 86 79   < > 79   CALCIUM mg/dL 7 7* 7 7* 7 9* 7 5* 7 5*   < > 7 8*   MAGNESIUM mg/dL  --   --   --  1 7 1 5*  --  1 6   PHOSPHORUS mg/dL  --   --   --  2 8  --   --   --     < > = values in this interval not displayed       Results from last 7 days   Lab Units 11/26/22  0339   AST U/L 45   ALT U/L 27   ALK PHOS U/L 424*   TOTAL PROTEIN g/dL 5 8*   ALBUMIN g/dL 1 1*   TOTAL BILIRUBIN mg/dL 0 34     Results from last 7 days   Lab Units 11/29/22  1139 11/29/22  0759 11/29/22  0051 11/28/22  2055 11/28/22  1618 11/28/22  1140 11/28/22  0744 11/27/22  2100 11/27/22  1049 11/27/22  0757 11/26/22  2101 11/26/22  1555   POC GLUCOSE mg/dl 98 99 94 89 91 99 103 166* 116 107 158* 182*     Results from last 7 days   Lab Units 11/29/22  0614 11/28/22  0532 11/27/22  0545 11/26/22  0339 11/25/22  0654 11/24/22  0527 11/23/22  0504   GLUCOSE RANDOM mg/dL 89 114 119 174* 166* 141* 107     Results from last 7 days   Lab Units 11/29/22  1125 11/29/22  0856 11/29/22  0614 11/24/22  1038 11/24/22  0527   PROTIME seconds  --   --   --   --  26 1*   INR   --   --   --   --  2 40*   PTT seconds 63* 131* 122*   < > 54*    < > = values in this interval not displayed           Results from last 7 days   Lab Units 11/28/22  0532 11/27/22  0545   PROCALCITONIN ng/ml 0 18 0 17     Medications:   Scheduled Medications:  acetaminophen, 650 mg, Oral, Q6H [facility]  amLODIPine, 10 mg, Oral, Daily  atorvastatin, 40 mg, Oral, Daily With Dinner  clopidogrel, 75 mg, Oral, Daily  doxazosin, 2 mg, Oral, Daily  gabapentin, 200 mg, Oral, TID  insulin glargine, 10 Units, Subcutaneous, HS  insulin lispro, 1-6 Units, Subcutaneous, TID AC  nystatin, , Topical, BID  pantoprazole, 40 mg, Oral, Early Morning  polyethylene glycol, 17 g, Oral, BID  QUEtiapine, 25 mg, Oral, HS  senna, 1 tablet, Oral, BID  sertraline, 150 mg, Oral, Daily      Continuous IV Infusions:  argatroban, 0 1-3 mcg/kg/min, Intravenous, Titrated  HYDROmorphone, , Intravenous, Continuous  sodium chloride, 20 mL/hr, Intravenous, Continuous      PRN Meds:  ALPRAZolam, 0 25 mg, Oral, Daily PRN  metoclopramide, 10 mg, Intravenous, Q6H PRN  naloxone, 0 04 mg, Intravenous, Q1MIN PRN  naloxone, 0 04 mg, Intravenous, Q3 min PRN  ondansetron, 4 mg, Intravenous, Q6H PRN    Discharge Plan: rehab    Network Utilization Review Department  ATTENTION: Please call with any questions or concerns to [phone] and carefully listen to the prompts so that you are directed to the right person  All voicemails are confidential   Wilma Morales all requests for admission clinical reviews, approved or denied determinations and any other requests to dedicated fax number below belonging to the campus where the patient is receiving treatment   List of dedicated fax numbers for the Facilities:  [address] DENIALS (Administrative/Medical Necessity) [phone]   [address] (Maternity/NICU/Pediatrics) [phone]   91 [address] Tang [phone]   [facility] 77 [phone]   [address] [facility] [phone]   1551 [facility] 134 815 [facility] [phone]

## 2022-11-30 ENCOUNTER — ANESTHESIA EVENT (INPATIENT)
Dept: PERIOP | Facility: HOSPITAL | Age: 62
End: 2022-11-30

## 2022-11-30 LAB
ABO GROUP BLD: NORMAL
ANION GAP SERPL CALCULATED.3IONS-SCNC: 6 MMOL/L (ref 4–13)
APTT PPP: 63 SECONDS (ref 23–37)
APTT PPP: 71 SECONDS (ref 23–37)
APTT PPP: 81 SECONDS (ref 23–37)
BLD GP AB SCN SERPL QL: NEGATIVE
BUN SERPL-MCNC: 8 MG/DL (ref 5–25)
CALCIUM SERPL-MCNC: 7.6 MG/DL (ref 8.3–10.1)
CHLORIDE SERPL-SCNC: 101 MMOL/L (ref 96–108)
CO2 SERPL-SCNC: 31 MMOL/L (ref 21–32)
CREAT SERPL-MCNC: 0.73 MG/DL (ref 0.6–1.3)
FERRITIN SERPL-MCNC: 583 NG/ML (ref 8–388)
GFR SERPL CREATININE-BSD FRML MDRD: 89 ML/MIN/1.73SQ M
GLUCOSE SERPL-MCNC: 103 MG/DL (ref 65–140)
GLUCOSE SERPL-MCNC: 108 MG/DL (ref 65–140)
GLUCOSE SERPL-MCNC: 113 MG/DL (ref 65–140)
GLUCOSE SERPL-MCNC: 82 MG/DL (ref 65–140)
GLUCOSE SERPL-MCNC: 91 MG/DL (ref 65–140)
IRON SATN MFR SERPL: 12 % (ref 15–50)
IRON SERPL-MCNC: 15 UG/DL (ref 50–170)
POTASSIUM SERPL-SCNC: 3.1 MMOL/L (ref 3.5–5.3)
RH BLD: NEGATIVE
SODIUM SERPL-SCNC: 138 MMOL/L (ref 135–147)
SPECIMEN EXPIRATION DATE: NORMAL
TIBC SERPL-MCNC: 127 UG/DL (ref 250–450)

## 2022-11-30 PROCEDURE — 97530 THERAPEUTIC ACTIVITIES: CPT

## 2022-11-30 PROCEDURE — 97535 SELF CARE MNGMENT TRAINING: CPT

## 2022-11-30 PROCEDURE — 86923 COMPATIBILITY TEST ELECTRIC: CPT

## 2022-11-30 PROCEDURE — 85730 THROMBOPLASTIN TIME PARTIAL: CPT | Performed by: INTERNAL MEDICINE

## 2022-11-30 PROCEDURE — 86901 BLOOD TYPING SEROLOGIC RH(D): CPT | Performed by: SURGERY

## 2022-11-30 PROCEDURE — 86900 BLOOD TYPING SEROLOGIC ABO: CPT | Performed by: SURGERY

## 2022-11-30 PROCEDURE — 99232 SBSQ HOSP IP/OBS MODERATE 35: CPT | Performed by: INTERNAL MEDICINE

## 2022-11-30 PROCEDURE — 99232 SBSQ HOSP IP/OBS MODERATE 35: CPT | Performed by: NURSE PRACTITIONER

## 2022-11-30 PROCEDURE — 99024 POSTOP FOLLOW-UP VISIT: CPT | Performed by: SURGERY

## 2022-11-30 PROCEDURE — 97110 THERAPEUTIC EXERCISES: CPT

## 2022-11-30 PROCEDURE — 82948 REAGENT STRIP/BLOOD GLUCOSE: CPT

## 2022-11-30 PROCEDURE — 80048 BASIC METABOLIC PNL TOTAL CA: CPT | Performed by: INTERNAL MEDICINE

## 2022-11-30 PROCEDURE — 86850 RBC ANTIBODY SCREEN: CPT | Performed by: SURGERY

## 2022-11-30 RX ORDER — CEFAZOLIN SODIUM 2 G/50ML
2000 SOLUTION INTRAVENOUS ONCE
Status: COMPLETED | OUTPATIENT
Start: 2022-11-30 | End: 2022-12-01

## 2022-11-30 RX ORDER — SODIUM CHLORIDE 9 MG/ML
125 INJECTION, SOLUTION INTRAVENOUS CONTINUOUS
Status: DISCONTINUED | OUTPATIENT
Start: 2022-11-30 | End: 2022-12-01

## 2022-11-30 RX ORDER — POTASSIUM CHLORIDE 20 MEQ/1
40 TABLET, EXTENDED RELEASE ORAL 2 TIMES DAILY
Status: DISCONTINUED | OUTPATIENT
Start: 2022-11-30 | End: 2022-12-07

## 2022-11-30 RX ORDER — CHLORHEXIDINE GLUCONATE 0.12 MG/ML
15 RINSE ORAL ONCE
Status: COMPLETED | OUTPATIENT
Start: 2022-11-30 | End: 2022-12-01

## 2022-11-30 RX ADMIN — NYSTATIN: 100000 POWDER TOPICAL at 17:36

## 2022-11-30 RX ADMIN — QUETIAPINE FUMARATE 25 MG: 25 TABLET ORAL at 21:31

## 2022-11-30 RX ADMIN — GABAPENTIN 200 MG: 100 CAPSULE ORAL at 17:30

## 2022-11-30 RX ADMIN — ACETAMINOPHEN 650 MG: 325 TABLET, FILM COATED ORAL at 17:29

## 2022-11-30 RX ADMIN — ACETAMINOPHEN 650 MG: 325 TABLET, FILM COATED ORAL at 00:55

## 2022-11-30 RX ADMIN — ALPRAZOLAM 0.25 MG: 0.25 TABLET ORAL at 21:31

## 2022-11-30 RX ADMIN — AMLODIPINE BESYLATE 10 MG: 10 TABLET ORAL at 08:29

## 2022-11-30 RX ADMIN — POTASSIUM CHLORIDE 40 MEQ: 1500 TABLET, EXTENDED RELEASE ORAL at 19:24

## 2022-11-30 RX ADMIN — POLYETHYLENE GLYCOL 3350 17 G: 17 POWDER, FOR SOLUTION ORAL at 09:00

## 2022-11-30 RX ADMIN — GABAPENTIN 200 MG: 100 CAPSULE ORAL at 21:31

## 2022-11-30 RX ADMIN — SODIUM CHLORIDE 20 ML/HR: 0.9 INJECTION, SOLUTION INTRAVENOUS at 08:46

## 2022-11-30 RX ADMIN — INSULIN GLARGINE 10 UNITS: 100 INJECTION, SOLUTION SUBCUTANEOUS at 21:31

## 2022-11-30 RX ADMIN — ACETAMINOPHEN 650 MG: 325 TABLET, FILM COATED ORAL at 05:13

## 2022-11-30 RX ADMIN — DOXAZOSIN 2 MG: 2 TABLET ORAL at 08:28

## 2022-11-30 RX ADMIN — PANTOPRAZOLE SODIUM 40 MG: 40 TABLET, DELAYED RELEASE ORAL at 05:13

## 2022-11-30 RX ADMIN — CLOPIDOGREL BISULFATE 75 MG: 75 TABLET ORAL at 08:28

## 2022-11-30 RX ADMIN — SERTRALINE HYDROCHLORIDE 150 MG: 100 TABLET ORAL at 08:28

## 2022-11-30 RX ADMIN — NYSTATIN: 100000 POWDER TOPICAL at 09:00

## 2022-11-30 RX ADMIN — ACETAMINOPHEN 650 MG: 325 TABLET, FILM COATED ORAL at 13:01

## 2022-11-30 RX ADMIN — GABAPENTIN 200 MG: 100 CAPSULE ORAL at 08:28

## 2022-11-30 RX ADMIN — HYDROMORPHONE HYDROCHLORIDE: 10 INJECTION, SOLUTION INTRAMUSCULAR; INTRAVENOUS; SUBCUTANEOUS at 08:49

## 2022-11-30 RX ADMIN — ATORVASTATIN CALCIUM 40 MG: 40 TABLET, FILM COATED ORAL at 17:29

## 2022-11-30 RX ADMIN — SODIUM CHLORIDE 125 ML/HR: 0.9 INJECTION, SOLUTION INTRAVENOUS at 23:54

## 2022-11-30 NOTE — ASSESSMENT & PLAN NOTE
Lab Results   Component Value Date    HGBA1C 9 8 (H) 10/25/2022     Recent Labs     11/29/22  2124 11/30/22  0730 11/30/22  1104 11/30/22  1601   POCGLU 96 91 82 103     · Blood sugars are acceptable on low-dose Lantus 10 units

## 2022-11-30 NOTE — ASSESSMENT & PLAN NOTE
Chest x-ray on 11/27 showed mild pulmonary vascular congestion  In addition she likely has atelectasis, hypoventilation and being sedentary contributing to her hypoxemia  Lasix 20 mg given 11/20/2022  Monitor with continuous pulse ox while on Dilaudid PCA

## 2022-11-30 NOTE — PROGRESS NOTES
[address] [facility]  Progress Note - [address] [facility] [address] 1960, 65 y o  female MRN: [id number]  Unit/Bed#: E4 -01 Encounter: [id number]  Primary Care Provider: [address], CRNP   Date and time admitted to hospital: 10/21/2022  7:58 PM    * PAD (peripheral artery disease) [facility])  Assessment & Plan  · Initially admitted for nonhealing wound of the left lower extremity, requiring multiple procedures ultimately required left AKA on 11/23/2022  · For right AKA tomorrow 12/01/2022 per vascular surgery  · Pain is controlled on basal dose of Dilaudid 0 1 mg every hour on her PCA  · Continue close monitoring with continuous pulse oximetry and neurochecks    Stroke [facility])  Assessment & Plan  · Noted to have change in mental status on 11/13/2022    · CT confirmed moderate right parietal lobe stroke, likely embolic in the setting of HIT  · Ongoing argatroban infusion  · Anticipate argatroban will be held temporarily for her procedure    HIT (heparin-induced thrombocytopenia)  Assessment & Plan  · Positive heparin antibody on 11/11/2020  · Platelet count has normalized while on argatroban infusion  · Anticipate argatroban will be held temporarily for procedure  · After all surgical interventions are done, switch to oral regimen        Type 2 diabetes mellitus with hyperglycemia, with long-term current use of insulin [facility])  Assessment & Plan  Lab Results   Component Value Date    HGBA1C 9 8 (H) 10/25/2022     Recent Labs     11/29/22  2124 11/30/22  0730 11/30/22  1104 11/30/22  1601   POCGLU 96 91 82 103     · Blood sugars are acceptable on low-dose Lantus 10 units    Acute on chronic anemia  Assessment & Plan  Monitor and transfuse as needed    Recent Labs     11/28/22  0532 11/29/22  0913   HGB 7 5* 7 6*         Hypokalemia  Assessment & Plan  Replete  Recent Labs     11/28/22  0532 11/29/22  0614   K 3 1* 3 0*         Benign essential hypertension  Assessment & Plan  Continue Norvasc 10 mg daily and Cardura 2 mg daily    Depression, recurrent ([facility] [address] )  Assessment & Plan  Evaluated by Psychiatry on this admission  · Continue Zoloft 150 mg daily / Quetiapine 25mg po Qhs    Acute respiratory failure with hypoxia ([facility] [address] )  Assessment & Plan  Chest x-ray on  showed mild pulmonary vascular congestion  In addition she likely has atelectasis, hypoventilation and being sedentary contributing to her hypoxemia  Lasix 20 mg given 2022  Monitor with continuous pulse ox while on Dilaudid PCA       VTE Pharmacologic Prophylaxis:   Pharmacologic: Argatroban  Mechanical VTE Prophylaxis in Place: No    Patient Centered Rounds: I have performed bedside rounds with nursing staff today  Discussions with Specialists or Other Care Team Provider:  Case management    Education and Discussions with Family / Patient:  Spoke with daughter at bedside    Time Spent for Care: 20 minutes  More than 50% of total time spent on counseling and coordination of care as described above  Current Length of Stay: 39 day(s)    Current Patient Status: Inpatient   Certification Statement: The patient will continue to require additional inpatient hospital stay due to PAD    Discharge Plan:  Anticipate short-term rehab placement    Code Status: Level 1 - Full Code      Subjective:   Overall pain is controlled after addition of basal Dilaudid dose  She admits being scared about her upcoming surgery    Objective:     Vitals:   Temp (24hrs), Av 8 °F (36 6 °C), Min:97 °F (36 1 °C), Max:99 °F (37 2 °C)    Temp:  [97 °F (36 1 °C)-99 °F (37 2 °C)] 98 4 °F (36 9 °C)  HR:  [74-87] 87  Resp:  [16-18] 18  BP: (130-154)/(59-78) 150/78  SpO2:  [94 %-100 %] 97 %  Body mass index is 34 25 kg/m²  Input and Output Summary (last 24 hours):        Intake/Output Summary (Last 24 hours) at 2022 1758  Last data filed at 2022 1621  Gross per 24 hour   Intake 472 53 ml   Output 1180 ml   Net -707 47 ml       Physical Exam:     Physical Exam  Constitutional:       Appearance: She is ill-appearing  HENT:      Head: Normocephalic and atraumatic  Nose: No congestion or rhinorrhea  Eyes:      General: No scleral icterus  Cardiovascular:      Rate and Rhythm: Normal rate and regular rhythm  Pulmonary:      Effort: No respiratory distress  Breath sounds: No wheezing or rales  Comments: Poor effort  Abdominal:      General: Abdomen is flat  Palpations: Abdomen is soft  Musculoskeletal:         General: Deformity present  Cervical back: Neck supple  Skin:     Comments: Right foot gangrene   Neurological:      Mental Status: She is alert and oriented to person, place, and time  Psychiatric:         Behavior: Behavior normal       Comments: Mood slightly depressed         Additional Data:     Labs:    Results from last 7 days   Lab Units 11/29/22  0913   WBC Thousand/uL 14 10*   HEMOGLOBIN g/dL 7 6*   HEMATOCRIT % 23 8*   PLATELETS Thousands/uL 320   NEUTROS PCT % 88*   LYMPHS PCT % 5*   MONOS PCT % 6   EOS PCT % 0     Results from last 7 days   Lab Units 11/29/22  0614 11/27/22  0545 11/26/22  0339   SODIUM mmol/L 137   < > 138   POTASSIUM mmol/L 3 0*   < > 3 1*   CHLORIDE mmol/L 100   < > 101   CO2 mmol/L 30   < > 29   BUN mg/dL 6   < > 8   CREATININE mg/dL 0 67   < > 0 75   ANION GAP mmol/L 7   < > 8   CALCIUM mg/dL 7 7*   < > 7 5*   ALBUMIN g/dL  --   --  1 1*   TOTAL BILIRUBIN mg/dL  --   --  0 34   ALK PHOS U/L  --   --  424*   ALT U/L  --   --  27   AST U/L  --   --  45   GLUCOSE RANDOM mg/dL 89   < > 174*    < > = values in this interval not displayed       Results from last 7 days   Lab Units 11/24/22  0527   INR  2 40*     Results from last 7 days   Lab Units 11/30/22  1601 11/30/22  1104 11/30/22  0730 11/29/22  2124 11/29/22  1626 11/29/22  1139 11/29/22  0759 11/29/22  0051 11/28/22  2055 11/28/22  1618 11/28/22  1140 11/28/22  0744   POC GLUCOSE mg/dl 103 82 91 96 91 98 99 94 89 91 99 103         Results from last 7 days   Lab Units 11/28/22  0532 11/27/22  0545   PROCALCITONIN ng/ml 0 18 0 17       * I Have Reviewed All Lab Data Listed Above  * Additional Pertinent Lab Tests Reviewed:  All Labs Within Last 24 Hours Reviewed    Imaging:    Imaging Reports Reviewed Today Include:  No new imaging    Recent Cultures (last 7 days):           Last 24 Hours Medication List:   Current Facility-Administered Medications   Medication Dose Route Frequency Provider Last Rate   • acetaminophen  650 mg Oral Q6H [address] Jorge A Sofia MD     • ALPRAZolam  0 25 mg Oral Daily PRN Jorge A Sofia MD     • amLODIPine  10 mg Oral Daily Jorge A Sofia MD     • argatroban  0 1-3 mcg/kg/min Intravenous Titrated Jorge A Sofia MD 0 02 mcg/kg/min (11/29/22 2224)   • atorvastatin  40 mg Oral Daily With Rodrick Lord MD     • cefazolin  2,000 mg Intravenous Once Jose Patel MD     • chlorhexidine  15 mL Swish & Spit Once Jose Patel MD     • clopidogrel  75 mg Oral Daily Jorge A Sofia MD     • doxazosin  2 mg Oral Daily Jorge A Sofia MD     • gabapentin  200 mg Oral TID Edyta Metz DO     • HYDROmorphone   Intravenous Continuous Tano Martinez MD     • insulin glargine  10 Units Subcutaneous HS Sugey Wiseman PA-C     • insulin lispro  1-6 Units Subcutaneous TID [facility] Jorge A Sofia MD     • metoclopramide  10 mg Intravenous Q6H PRN Jorge A Sofia MD     • naloxone  0 04 mg Intravenous Q1MIN PRN Jorge A Sofia MD     • naloxone  0 04 mg Intravenous Q3 min PRN Jorge A Sofia MD     • nystatin   Topical BID Jorge A Sofia MD     • ondansetron  4 mg Intravenous Q6H PRN Jorge A Sofia MD     • pantoprazole  40 mg Oral Early Morning Jorge A Sofia MD     • polyethylene glycol  17 g Oral BID Mindi Dietz DO     • potassium chloride  40 mEq Oral BID Tano Martinez MD     • QUEtiapine  25 mg Oral HS Jorge A Sofia MD     • senna  1 tablet Oral BID Long Fairbanks MD     • sertraline  150 mg Oral Daily Nahid Moscoso MD     • sodium chloride  20 mL/hr Intravenous Continuous Nahid Moscoso MD 20 mL/hr (11/30/22 4647)   • sodium chloride  125 mL/hr Intravenous Continuous Delphy Defalcis, DO          Today, Patient Was Seen By: Remedios Madrid MD    ** Please Note: Dictation voice to text software may have been used in the creation of this document   **

## 2022-11-30 NOTE — ASSESSMENT & PLAN NOTE
· Initially admitted for nonhealing wound of the left lower extremity, requiring multiple procedures ultimately required left AKA on 11/23/2022  · For right AKA tomorrow 12/01/2022 per vascular surgery    · Pain is controlled on basal dose of Dilaudid 0 1 mg every hour on her PCA  · Continue close monitoring with continuous pulse oximetry and neurochecks

## 2022-11-30 NOTE — OCCUPATIONAL THERAPY NOTE
Occupational Therapy Progress Note(time=0850-0920)     Patient Name: Madi [facility] Nadeem  MFOED'H Date: 11/30/2022  Problem List  Principal Problem:    PAD (peripheral artery disease) ([facility] )  Active Problems:    Acute respiratory failure with hypoxia (HCC)    Depression, recurrent (HCC)    Benign essential hypertension    Stroke ([place] [id number] )    Type 2 diabetes mellitus with hyperglycemia, with long-term current use of insulin ([facility])    Hypokalemia    Acute on chronic anemia    Diabetic ulcer of heel associated with diabetes mellitus due to underlying condition ([facility])    HIT (heparin-induced thrombocytopenia)    Febrile    Diarrhea            11/30/22 0850   Note Type   Note Type Treatment   Pain Assessment   Pain Assessment Tool 0-10   Pain Score 6   Pain Location/Orientation Orientation: Bilateral;Location: Leg   Restrictions/Precautions   Other Precautions Cognitive; Chair Alarm; Bed Alarm;Multiple lines; Fall Risk;O2;Pain  (plans for R AKA 12/1)   ADL   Where Assessed Edge of bed   Eating Assistance 5  Supervision/Setup   Grooming Assistance 4  Minimal Assistance   UB Bathing Assistance 2  Maximal Assistance   LB Bathing Assistance 1  Total Assistance   UB Dressing Assistance 2  Maximal Assistance   LB Dressing Assistance 1  Total Assistance   Toileting Assistance  1  Total Assistance   Bed Mobility   Rolling R 1  Dependent   Additional items Assist x 2   Rolling L 1  Dependent   Additional items Assist x 2   Supine to Sit 1  Dependent   Additional items Assist x 2   Sit to Supine 1  Dependent   Additional items Assist x 2   Transfers   Sit to Stand   (n/a)   Functional Mobility   Functional Mobility   (continue to recommend hoyerlift for OOB with nsg)   Subjective   Subjective "They are going to take my other leg tomorrow "   Cognition   Overall Cognitive Status Impaired   Arousal/Participation Responsive   Attention Attends with cues to redirect   Orientation Level Oriented X4   Memory Decreased recall of precautions Following Commands Follows one step commands with increased time or repetition   Activity Tolerance   Activity Tolerance Patient limited by fatigue;Patient limited by pain   Medical Staff Made Aware nsg, CM, P T  Assessment   Assessment Pt seen for 30 min tx session with focus on functional mobility, functional balance, ADL status, transfer safety, activity tolerance, and cognition  Pt able to tolerate EOB sitting for 5-7mins with p- sitting balance  Pt required heavy assistance with all functional mobility tasks  Pt demonstrated need for heavy assistance with UE and LE ADLs  Pt able to demonstrate good b/l UE AROM/strength  Therapist reviewed pt's personal goals, "to be able to sit at my kitchen table to do a puzzle", and reviewed need for increasing currently activity level  Pt demonstrating slight cognitive deficits(i e memory, problem-solving)  Pt continues to demonstrate appropriateness for inpt rehab to improve her overall level of independence  Goals stated on 11/15 remain appropriate; see for details  Will continue  Plan   Treatment Interventions ADL retraining;Functional transfer training;UE strengthening/ROM; Endurance training;Cognitive reorientation;Patient/family training;Equipment evaluation/education; Compensatory technique education;Continued evaluation   Goal Expiration Date 12/14/22   OT Treatment Day 10   OT Frequency 3-5x/wk   Recommendation   OT Discharge Recommendation Post acute rehabilitation services   AM-PAC Daily Activity Inpatient   Lower Body Dressing 1   Bathing 1   Toileting 1   Upper Body Dressing 2   Grooming 3   Eating 3   Daily Activity Raw Score 11   Daily Activity Standardized Score (Calc for Raw Score >=11) 29 04   AM-PAC Applied Cognition Inpatient   Following a Speech/Presentation 3   Understanding Ordinary Conversation 3   Taking Medications 3   Remembering Where Things Are Placed or Put Away 3   Remembering List of 4-5 Errands 2   Taking Care of Complicated Tasks 2 Applied Cognition Raw Score 16   Applied Cognition Standardized Score 35 03   Ed Avalos

## 2022-11-30 NOTE — ANESTHESIA PREPROCEDURE EVALUATION
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 (HCC)   (+) Uncontrolled type 2 diabetes with neuropathy      GI/HEPATIC   (+) Esophageal reflux      HEMATOLOGY   (+) Acute on chronic anemia   (+) HIT (heparin-induced thrombocytopenia)      MUSCULOSKELETAL   (+) Arthritis      NEURO/PSYCH   (+) Anxiety   (+) CVA (cerebral vascular accident) ([facility] [address] )   (+) Depression, recurrent (HCC)   (+) Diabetic peripheral neuropathy (HCC)   (+) Stroke [facility])        Physical Exam    Airway    Mallampati score: I  TM Distance: >3 FB  Neck ROM: full     Dental       Cardiovascular  Rhythm: regular, Rate: normal, Cardiovascular exam normal    Pulmonary  Pulmonary exam normal     Other Findings        Anesthesia Plan  ASA Score- 4     Anesthesia Type- general with ASA Monitors  Additional Monitors:   Airway Plan: ETT  Plan Factors-Exercise tolerance (METS): >4 METS  Chart reviewed  Existing labs reviewed  Patient summary reviewed  Patient is not a current smoker  Induction- intravenous  Postoperative Plan-     Informed Consent- Anesthetic plan and risks discussed with patient

## 2022-11-30 NOTE — PROGRESS NOTES
Vascular Surgery  Progress Note   Sukhi Sons 65 y o  female MRN: [id number]  Unit/Bed#: E4 -01 Encounter: [id number]    Assessment:  65F with DM, HTN, HLD, h/o CVA presenting with bilateral CLTI (nonhealing L heel ulcer, R hallux and 5th digit ulceration underwent multiple endovascular interventions listed below followed by L AKA for non-salvageable foot  Hospital course complicated by atheroembolism with significant tissue loss and R hemispheric stroke and JUSTIN     10/31 R ALLISON BES, L SFA shockwave/FATOU   R CFA DCB/Atherectomy, SFA/pop FATOU, pop DCB  11/10 R ALLISON thrombectomy/BES, L SFA stent thrombectomy/SES, pop POBA   L AKA     Afebrile overnight  AM labs pending, yesterday WBC 14 10 and Hb 7 6      Plan:  Plan for OR tomorrow for R AKA  Continue argatroban gtt for HTT   Continue pain control  NPO/IVF at midnight  Continue Plavix and Lipitor  Will take down Prevena vac tomorrow in [address] [place] [address] per primary team  Vascular surgery following    Subjective/Objective     Subjective:   No acute events overnight  No complaints this morning  Objective:     Vitals:Blood pressure 150/73, pulse 82, temperature 99 °F (37 2 °C), temperature source Temporal, resp  rate 16, height 5' 4" (1 626 m), weight 90 5 kg (199 lb 8 3 oz), SpO2 94 %, not currently breastfeeding  ,Body mass index is 34 25 kg/m²  Temp (24hrs), Av 9 °F (36 6 °C), Min:97 °F (36 1 °C), Max:99 °F (37 2 °C)  Current: Temperature: 99 °F (37 2 °C)      Intake/Output Summary (Last 24 hours) at 2022 0727  Last data filed at 2022 0707  Gross per 24 hour   Intake 716 53 ml   Output 1005 ml   Net -288 47 ml       Invasive Devices     Peripherally Inserted Central Catheter Line  Duration           PICC Line  Right Basilic 11 days          Drain  Duration           Urethral Catheter Non-latex 18 Fr   13 days              Physical Exam:    General: No acute distress  CV: Normal rate  Respiratory: Non-labored breathing  Abdominal: Soft  Extremities: Warm  Neurologic: Alert    Wound/Incision:  L AKA stump site with Prevena vac in place c/d/i  R foot with dry gangrene with dry escar and skin slouphing, leg and thigh wrapped with kerlix not taken down, there is extensive tissue loss of lower leg with schar as well as medial thigh  Unfortunately, this appears to be full thickness ischemic injury of the posterolateral leg  The thigh is partial thickness      Pulse exam:  Nonpalpable R pedal pulses

## 2022-11-30 NOTE — ASSESSMENT & PLAN NOTE
· Positive heparin antibody on 11/11/2020  · Platelet count has normalized while on argatroban infusion  · Anticipate argatroban will be held temporarily for procedure  · After all surgical interventions are done, switch to oral regimen

## 2022-11-30 NOTE — ASSESSMENT & PLAN NOTE
· Noted to have change in mental status on 11/13/2022    · CT confirmed moderate right parietal lobe stroke, likely embolic in the setting of HIT  · Ongoing argatroban infusion  · Anticipate argatroban will be held temporarily for her procedure

## 2022-11-30 NOTE — NURSING NOTE
I assumed care of this patient at 2300 tonight  At this time, bed alarm is activated, call bell is within reach, Continuous pulse oximetry monitor functioning properly, pulse ox 98%  PCA pump handoff done with previous RN, see flowsheet for details  Argatroban running at 0 02 mcg/kg/min  SLIM provider aware      Jennie Noriega RN

## 2022-11-30 NOTE — PLAN OF CARE
Problem: OCCUPATIONAL THERAPY ADULT  Goal: Performs self-care activities at highest level of function for planned discharge setting  See evaluation for individualized goals  Description: Treatment Interventions: ADL retraining, Functional transfer training, UE strengthening/ROM, Endurance training, Cognitive reorientation, Patient/family training, Equipment evaluation/education, Compensatory technique education, Energy conservation, Activityengagement          See flowsheet documentation for full assessment, interventions and recommendations  Note: Limitation: Decreased ADL status, Decreased UE strength, Decreased endurance, Decreased high-level ADLs (New L visual field deficits)  Prognosis: Good  Assessment: Pt seen for 30 min tx session with focus on functional mobility, functional balance, ADL status, transfer safety, activity tolerance, and cognition  Pt able to tolerate EOB sitting for 5-7mins with p- sitting balance  Pt required heavy assistance with all functional mobility tasks  Pt demonstrated need for heavy assistance with UE and LE ADLs  Pt able to demonstrate good b/l UE AROM/strength  Therapist reviewed pt's personal goals, "to be able to sit at my kitchen table to do a puzzle", and reviewed need for increasing currently activity level  Pt demonstrating slight cognitive deficits(i e memory, problem-solving)  Pt continues to demonstrate appropriateness for inpt rehab to improve her overall level of independence  Goals stated on 11/15 remain appropriate; see for details  Will continue       OT Discharge Recommendation: Post acute rehabilitation services

## 2022-11-30 NOTE — PROGRESS NOTES
Medical Oncology/Hematology Progress Note  Junior Edouard, female, 65 y o , 1960,  [address] [facility] 4 /E4 -*, [id number]     Reason for initial consultation: Anemia  LOS: 40 days as of 11/30/22    Assessment and Plan:  1  Acute on chronic anemia  -Anemia of chronic disease exacerbated by hospitalization and surgical interventions as outlined under  The heading of peripheral vascular disease  -Evident since admission on 10/21/22, 10 1 g/dL  No lab results in between 2/2022 and day of admission  Hemoglobin trend: 7 6 (11/30) <--7 5 (11/28) <--9 0 (11/27) <--7 6 (11/26) <--4 8 (11/24) L AKA procedure <--6 4 (11/15) <-7 0 (11/14) MCV 84, MCHC 31 9  -s/p right CFA balloon angioplasty angiogram/atherectomy, CFA stent placement on 11/7/22, experienced some perioperative blood loss  -Period of acute kidney, was followed by Nephrology  NYDIA has now resolved  -Denies acute bleeding; hematuria, epistaxis, hematemesis, hematochezia  Drop in hemoglobin coincide with surgical interventions for this inpatient stay    2  Peripheral vascular disease   -Presence of non-healing left heel wound lower, s/p right CFA balloon angioplasty  -Angiogram/atherectomy, CFA stent placement on 11/7/22; experienced some perioperative blood loss  -Plan for OR surgery: R AKA today, 11/30/22  She is s/p L AKA on 11/23/22   -Followed by Vascular    3  Stroke [facility])  -Multiple vascular procedures/interventions in [facility], complicated by atheroembolic events    -On 14/47, CT head was obtained on 11/13 precipitated by mental status change  Results showed moderate size acute/recent infarct in the right parietal lobe with local mass effect     -Etiology of infarct unclear; need to r/o if cardioembolic source/septic emboli  -Patient has no focal deficits  LABS (11/30/22):  Platelets 644G WNL  WBC 14 10 mildly elevated   Suspicion of HIT at some point, thrombocytopenia resolved  Heparin changed to argatroban       PLAN:  1) Monitor daily CBCs, will repeat iron panel   2) Continue transfusion support for hemoglobin <7 g/dL, or if bleeding  Hemoglobin stable at 7 6 g/dL, but going through surgery today (R AKA)  Anticipate some level of blood loss from procedure today as well  3) Outpatient follow up plan: TBD  Communication with team:  Communicated with primary team  Reviewed this patient with Dr Yajaira Dumont  Please do not hesitate to reach out for any questions or concerns  Reena Cooper, DNP, CRNP  Hematology-Oncology    History of present illness:  [address] Nadeem is a 65 y o  female with history of insulin dependent DM , HTN, HLD, obesity, depression, initially presenting on 10/22 for non-healing LE wounds and subsequent prolonged hospitalization  She has a long-standing hx of PAD s/p multiple BLE endovascular interventions c/b atheroembolic events to BLE, now with subacute right-sided CVA on CT head  Results showed moderate size acute/recent infarct in the right parietal lobe with local mass effect  This was obtained on 11/13 precipitated by mental status change  Etiology of infarct unclear; need to r/o if cardioembolic source/septic emboli  Patient has no focal deficits on examination  During admission, she was placed on heparin drip and developed thrombocytopenia; concern for HIT and was switched to argatroban  She also met sepsis criteria  She developed NYDIA, now resolved  Hematology was consulted to offer recommendations regarding her anemia  Patient's hemoglobin stable today is 7 6, has only dropped by 4 8 status post left AKA on 11/23/2022  Transfusion support given, hemoglobin recover to 8 8 on the same day  Anticipate some level of blood loss for planned surgical intervention for today; scheduled for OR for a right AKA  Transfusion recommendations in place  Will repeat iron panel, to assess benefit for iron repletion  Patient has some periods of hypochromic anemia       Review of Systems:   Review of Systems   Constitutional: Positive for activity change and fatigue  Negative for chills and fever  HENT: Negative for ear pain and sore throat  Eyes: Negative for pain and visual disturbance  Respiratory: Negative for cough and shortness of breath  Cardiovascular: Negative for chest pain and palpitations  Gastrointestinal: Negative for abdominal pain and vomiting  Genitourinary: Negative for dysuria and hematuria  Musculoskeletal: Negative for arthralgias and back pain  BL LE pain   Skin: Positive for pallor  Negative for color change and rash  Neurological: Positive for weakness  Negative for seizures and syncope  Psychiatric/Behavioral: The patient is nervous/anxious  All other systems reviewed and are negative  Oncology History:   Cancer Staging  No matching staging information was found for the patient  Oncology History    No history exists  Past Medical History:   Past Medical History:   Diagnosis Date   • Anxiety    • Depression    • Diabetes ([facility] [address] )    • Diabetes mellitus [facility] )    • Esophageal reflux     28 APR 2015 RESOLVED   • Hyperlipidemia    • Hypertension    • Low back pain     sciatica   • Pneumonia 2019   • Stroke ([facility] ) 12/2015    small vessel, L frontal corona radiata   Rx asa 81, Lipitor 40, risk modification   • Vitamin D deficiency        Past Surgical History:   Procedure Laterality Date   • ARTERIOGRAM Right 11/7/2022    Procedure: -Right lower extremity angiogram -Right CFA balloon angioplasty with [id number]  [employer]  -Right CFA DCB with 6x40 Bard Lutonix -Right CFA atherectomy with Jetstream and distal embolization protection with 7mm Spider FX -Right SFA/Pop angioplasty with 4x40 Chololate balloon -Right SFA/Pop DCB with 5x150 Bard Lutonix -Right SFA stent with [employer] x2 -R   • COLONOSCOPY     • IR AORTAGRAM WITH RUN-OFF  10/31/2022   • IR LOWER EXTREMITY ANGIOGRAM  11/10/2022   • IR LOWER EXTREMITY ANGIOGRAM  11/7/2022   • HI AMPUTATE THIGH,THRU FEMUR Left 2022    Procedure: (AKA);   Surgeon: Jeff Bradshaw DO;  Location: [facility];  Service: Vascular       Family History   Problem Relation Age of Onset   • Diabetes Mother    • Lung cancer Mother    • Cancer Father    • Lung cancer Father    • Hypertension Brother    • Hypertension Family        Social History     Socioeconomic History   • Marital status: Single     Spouse name: None   • Number of children: None   • Years of education: None   • Highest education level: None   Occupational History   • None   Tobacco Use   • Smoking status: Former     Types: Cigarettes     Quit date:      Years since quittin 9   • Smokeless tobacco: Never   Vaping Use   • Vaping Use: Never used   Substance and Sexual Activity   • Alcohol use: Never     Comment: OCCASIONAL ALCOHOL USE IN ALL SCRIPTS   • Drug use: No   • Sexual activity: None   Other Topics Concern   • None   Social History Narrative   • None     Social Determinants of Health     Financial Resource Strain: Not on file   Food Insecurity: Not on file   Transportation Needs: Not on file   Physical Activity: Not on file   Stress: Not on file   Social Connections: Not on file   Intimate Partner Violence: Not on file   Housing Stability: Low Risk    • Unable to Pay for Housing in the Last Year: No   • Number of Places Lived in the Last Year: 1   • Unstable Housing in the Last Year: No         Current Facility-Administered Medications:   •  acetaminophen (TYLENOL) tablet 650 mg, 650 mg, Oral, Q6H [address], Charli Peterson MD, 650 mg at 22 1301  •  ALPRAZolam (XANAX) tablet 0 25 mg, 0 25 mg, Oral, Daily PRN, Charli Peterson MD, 0 25 mg at 22 0020  •  amLODIPine (NORVASC) tablet 10 mg, 10 mg, Oral, Daily, Charli Peterson MD, 10 mg at 22 3981  •  argatroban infusion (premix), 0 1-3 mcg/kg/min, Intravenous, Titrated, Charli Peterson MD, Last Rate: 0 1 mL/hr at 22, 0 02 mcg/kg/min at 22  •  atorvastatin (LIPITOR) tablet 40 mg, 40 mg, Oral, Daily With Nanette Dimas MD, 40 mg at 11/29/22 1731  •  ceFAZolin (ANCEF) IVPB (premix in dextrose) 2,000 mg 50 mL, 2,000 mg, Intravenous, Once, Dalila Thomas MD  •  chlorhexidine (PERIDEX) 0 12 % oral rinse 15 mL, 15 mL, Swish & Spit, Once, Dalila Thomas MD  •  clopidogrel (PLAVIX) tablet 75 mg, 75 mg, Oral, Daily, Miracle Vance MD, 75 mg at 11/30/22 4075  •  doxazosin (CARDURA) tablet 2 mg, 2 mg, Oral, Daily, Miracle Vance MD, 2 mg at 11/30/22 0596  •  gabapentin (NEURONTIN) capsule 200 mg, 200 mg, Oral, TID, Mindi Dietz DO, 200 mg at 11/30/22 6236  •  HYDROmorphone (DILAUDID) 1 mg/mL 50 mL PCA, , Intravenous, Continuous, Dionne Smith MD, New Bag at 11/30/22 3739  •  insulin glargine (LANTUS) subcutaneous injection 10 Units 0 1 mL, 10 Units, Subcutaneous, HS, Sugey Wiseman PA-C, 10 Units at 11/29/22 2132  •  insulin lispro (HumaLOG) 100 units/mL subcutaneous injection 1-6 Units, 1-6 Units, Subcutaneous, TID AC, 1 Units at 11/26/22 1635 **AND** Fingerstick Glucose (POCT), , , TID AC, Miracle Vance MD  •  metoclopramide (REGLAN) injection 10 mg, 10 mg, Intravenous, Q6H PRN, Miracle Vance MD  •  naloxone (NARCAN) 0 04 mg/mL syringe 0 04 mg, 0 04 mg, Intravenous, Q1MIN PRN, Miracle Vance MD  •  naloxone (NARCAN) 0 04 mg/mL syringe 0 04 mg, 0 04 mg, Intravenous, Q3 min PRN, Miracle Vance MD  •  nystatin (MYCOSTATIN) powder, , Topical, BID, Miracle Vance MD, Given at 11/30/22 0900  •  ondansetron (ZOFRAN) injection 4 mg, 4 mg, Intravenous, Q6H PRN, Miracle Vance MD, 4 mg at 11/24/22 0520  •  pantoprazole (PROTONIX) EC tablet 40 mg, 40 mg, Oral, Early Morning, Miracle Vance MD, 40 mg at 11/30/22 0513  •  polyethylene glycol (MIRALAX) packet 17 g, 17 g, Oral, BID, Mindi Dietz DO, 17 g at 11/30/22 0900  •  QUEtiapine (SEROquel) tablet 25 mg, 25 mg, Oral, HS, Miracle Vance MD, 25 mg at 11/29/22 6336  •  senna (SENOKOT) tablet 8 6 mg, 1 tablet, Oral, BID, Jennye Babinski, MD, 8 6 mg at 11/29/22 6116  •  sertraline (ZOLOFT) tablet 150 mg, 150 mg, Oral, Daily, Fabrizio Hollis MD, 150 mg at 11/30/22 6117  •  sodium chloride 0 9 % infusion, 20 mL/hr, Intravenous, Continuous, Fabrizio Hollis MD, Last Rate: 20 mL/hr at 11/30/22 0846, 20 mL/hr at 11/30/22 0846  •  sodium chloride 0 9 % infusion, 125 mL/hr, Intravenous, Continuous, Delphy Defalcis, DO    Medications Prior to Admission   Medication   • ALPRAZolam (XANAX) 0 25 mg tablet   • amLODIPine (NORVASC) 10 mg tablet   • aspirin 81 mg chewable tablet   • atorvastatin (LIPITOR) 40 mg tablet   • clotrimazole-betamethasone (LOTRISONE) 1-0 05 % cream   • fluocinonide (LIDEX) 0 05 % ointment   • gabapentin (NEURONTIN) 100 mg capsule   • insulin aspart (NovoLOG FlexPen) 100 UNIT/ML injection pen   • insulin glargine (Lantus SoloStar) 100 units/mL injection pen   • lisinopril (ZESTRIL) 40 mg tablet   • metoprolol succinate (TOPROL-XL) 25 mg 24 hr tablet   • Multiple Vitamin (multivitamin) tablet   • sertraline (ZOLOFT) 100 mg tablet   • Trulicity 7 67 HI/4 8LZ SOPN   • Cholecalciferol (Vitamin D-3) 25 MCG (1000 UT) CAPS   • Continuous Blood Gluc  (FreeStyle Noé 14 Day Gilman) LITO   • Continuous Blood Gluc Sensor (FreeStyle Noé 14 Day Sensor) MISC   • Doxylamine Succinate, Sleep, (UNISOM PO)   • glucose blood (True Metrix Blood Glucose Test) test strip   • Insulin Pen Needle (B-D UF III MINI PEN NEEDLES) 31G X 5 MM MISC   • Lancets 28G MISC   • pantoprazole (PROTONIX) 40 mg tablet   • potassium chloride (K-DUR,KLOR-CON) 10 mEq tablet       Allergies   Allergen Reactions   • Heparin Other (See Comments)     History of HIT         Physical Exam:     /60 (BP Location: Right arm)   Pulse 81   Temp (!) 97 °F (36 1 °C) (Temporal)   Resp 18   Ht 5' 4" (1 626 m)   Wt 90 5 kg (199 lb 8 3 oz)   SpO2 98%   BMI 34 25 kg/m²     Physical Exam  Constitutional:       Appearance: She is obese     HENT: Head: Normocephalic  Mouth/Throat:      Mouth: Mucous membranes are dry  Eyes:      Conjunctiva/sclera: Conjunctivae normal    Cardiovascular:      Rate and Rhythm: Normal rate and regular rhythm  Pulmonary:      Effort: Pulmonary effort is normal       Comments: On 2L of O2 via NC  Abdominal:      General: Bowel sounds are normal       Palpations: Abdomen is soft  Musculoskeletal:      Right lower leg: Edema present  Left lower leg: Edema present  Skin:     General: Skin is warm and dry  Findings: Bruising present  Neurological:      General: No focal deficit present  Mental Status: She is alert and oriented to person, place, and time  Motor: Weakness present     Psychiatric:         Mood and Affect: Mood normal            Recent Results (from the past 48 hour(s))   Fingerstick Glucose (POCT)    Collection Time: 11/28/22  4:18 PM   Result Value Ref Range    POC Glucose 91 65 - 140 mg/dl   APTT    Collection Time: 11/28/22  5:22 PM   Result Value Ref Range    PTT 77 (H) 23 - 37 seconds   Fingerstick Glucose (POCT)    Collection Time: 11/28/22  8:55 PM   Result Value Ref Range    POC Glucose 89 65 - 140 mg/dl   Fingerstick Glucose (POCT)    Collection Time: 11/29/22 12:51 AM   Result Value Ref Range    POC Glucose 94 65 - 140 mg/dl   Basic metabolic panel    Collection Time: 11/29/22  6:14 AM   Result Value Ref Range    Sodium 137 135 - 147 mmol/L    Potassium 3 0 (L) 3 5 - 5 3 mmol/L    Chloride 100 96 - 108 mmol/L    CO2 30 21 - 32 mmol/L    ANION GAP 7 4 - 13 mmol/L    BUN 6 5 - 25 mg/dL    Creatinine 0 67 0 60 - 1 30 mg/dL    Glucose 89 65 - 140 mg/dL    Calcium 7 7 (L) 8 3 - 10 1 mg/dL    eGFR 95 ml/min/1 73sq m   APTT    Collection Time: 11/29/22  6:14 AM   Result Value Ref Range     (HH) 23 - 37 seconds   Fingerstick Glucose (POCT)    Collection Time: 11/29/22  7:59 AM   Result Value Ref Range    POC Glucose 99 65 - 140 mg/dl   APTT    Collection Time: 11/29/22 8:56 AM   Result Value Ref Range     (HH) 23 - 37 seconds   CBC and differential    Collection Time: 11/29/22  9:13 AM   Result Value Ref Range    WBC 14 10 (H) 4 31 - 10 16 Thousand/uL    RBC 2 82 (L) 3 81 - 5 12 Million/uL    Hemoglobin 7 6 (L) 11 5 - 15 4 g/dL    Hematocrit 23 8 (L) 34 8 - 46 1 %    MCV 84 82 - 98 fL    MCH 27 0 26 8 - 34 3 pg    MCHC 31 9 31 4 - 37 4 g/dL    RDW 14 8 11 6 - 15 1 %    MPV 8 4 (L) 8 9 - 12 7 fL    Platelets 276 749 - 172 Thousands/uL    nRBC 0 /100 WBCs    Neutrophils Relative 88 (H) 43 - 75 %    Immat GRANS % 1 0 - 2 %    Lymphocytes Relative 5 (L) 14 - 44 %    Monocytes Relative 6 4 - 12 %    Eosinophils Relative 0 0 - 6 %    Basophils Relative 0 0 - 1 %    Neutrophils Absolute 12 24 (H) 1 85 - 7 62 Thousands/µL    Immature Grans Absolute 0 13 0 00 - 0 20 Thousand/uL    Lymphocytes Absolute 0 75 0 60 - 4 47 Thousands/µL    Monocytes Absolute 0 87 0 17 - 1 22 Thousand/µL    Eosinophils Absolute 0 06 0 00 - 0 61 Thousand/µL    Basophils Absolute 0 05 0 00 - 0 10 Thousands/µL   APTT    Collection Time: 11/29/22 11:25 AM   Result Value Ref Range    PTT 63 (H) 23 - 37 seconds   Fingerstick Glucose (POCT)    Collection Time: 11/29/22 11:39 AM   Result Value Ref Range    POC Glucose 98 65 - 140 mg/dl   Fingerstick Glucose (POCT)    Collection Time: 11/29/22  4:26 PM   Result Value Ref Range    POC Glucose 91 65 - 140 mg/dl   APTT    Collection Time: 11/29/22  4:39 PM   Result Value Ref Range     (HH) 23 - 37 seconds   APTT    Collection Time: 11/29/22  8:54 PM   Result Value Ref Range    PTT 51 (H) 23 - 37 seconds   Fingerstick Glucose (POCT)    Collection Time: 11/29/22  9:24 PM   Result Value Ref Range    POC Glucose 96 65 - 140 mg/dl   APTT    Collection Time: 11/30/22  1:04 AM   Result Value Ref Range    PTT 63 (H) 23 - 37 seconds   APTT    Collection Time: 11/30/22  5:30 AM   Result Value Ref Range    PTT 81 (H) 23 - 37 seconds   Fingerstick Glucose (POCT) Collection Time: 11/30/22  7:30 AM   Result Value Ref Range    POC Glucose 91 65 - 140 mg/dl   Fingerstick Glucose (POCT)    Collection Time: 11/30/22 11:04 AM   Result Value Ref Range    POC Glucose 82 65 - 140 mg/dl       CTA head and neck w wo contrast    Result Date: 11/13/2022  Narrative: CTA NECK AND BRAIN WITH AND WITHOUT CONTRAST INDICATION: Stroke/TIA, determine embolic source stroke COMPARISON:   Same date CT TECHNIQUE:  Routine CT imaging of the Brain without contrast   Post contrast imaging was performed after administration of iodinated contrast through the neck and brain  Post contrast axial 0 625 mm images timed to opacify the arterial system  3D rendering was performed on an independent workstation  MIP reconstructions performed  Coronal reconstructions were performed of the noncontrast portion of the brain  Radiation dose length product (DLP) for this visit:  578 mGy-cm   This examination, like all CT scans performed in the [facility], was performed utilizing techniques to minimize radiation dose exposure, including the use of iterative reconstruction and automated exposure control  IV Contrast:  85 mL of iohexol (OMNIPAQUE)  IMAGE QUALITY:   Diagnostic FINDINGS: NONCONTRAST BRAIN PARENCHYMA:  Right parietal subacute ischemia  VENTRICLES AND EXTRA-AXIAL SPACES:  Normal for the patient's age  VISUALIZED ORBITS AND PARANASAL SINUSES:  Unremarkable  CERVICAL VASCULATURE AORTIC ARCH AND GREAT VESSELS:  Mild atherosclerotic disease of the arch, proximal great vessels and visualized subclavian vessels  No significant stenosis  RIGHT VERTEBRAL ARTERY CERVICAL SEGMENT:  Moderate stenosis at the origin  The vessel is normal in caliber throughout the neck  LEFT VERTEBRAL ARTERY CERVICAL SEGMENT:  Moderate stenosis at the origin  The vessel is normal in caliber throughout the neck   RIGHT EXTRACRANIAL CAROTID SEGMENT:  Bilateral common carotid retropharyngeal course with short segment moderate plaque stenosis  Normal bifurcation and cervical internal carotid artery  No stenosis or dissection  LEFT EXTRACRANIAL CAROTID SEGMENT:  Mild atherosclerotic disease of the distal common carotid artery and proximal cervical internal carotid artery without significant stenosis compared to the more distal ICA  NASCET criteria was used to determine the degree of internal carotid artery diameter stenosis  INTRACRANIAL VASCULATURE INTERNAL CAROTID ARTERIES:  Normal enhancement of the intracranial portions of the internal carotid arteries  Normal ophthalmic artery origins  Normal ICA terminus  ANTERIOR CIRCULATION:  Symmetric A1 segments and anterior cerebral arteries with normal enhancement  Normal anterior communicating artery  MIDDLE CEREBRAL ARTERY CIRCULATION:  M1 segment and middle cerebral artery branches demonstrate normal enhancement bilaterally  DISTAL VERTEBRAL ARTERIES:  Normal distal vertebral arteries  Posterior inferior cerebellar artery origins are normal  Normal vertebral basilar junction  BASILAR ARTERY:  Basilar artery is normal in caliber  Normal superior cerebellar arteries  POSTERIOR CEREBRAL ARTERIES: Both posterior cerebral arteries arises from the basilar tip  Both arteries demonstrate normal enhancement  Normal posterior communicating arteries  VENOUS STRUCTURES:  Normal  NON VASCULAR ANATOMY BONY STRUCTURES:  No acute osseous abnormality  SOFT TISSUES OF THE NECK:  Unremarkable  THORACIC INLET:  Small bilateral pleural effusions, partially visualized  Impression: Moderate bilateral vertebral artery origin plaque stenosis  Bilateral common carotid retropharyngeal course with short segment moderate plaque stenosis  No occlusion or dissection  Workstation performed: [id number]     XR chest portable    Result Date: 11/13/2022  Narrative: CHEST INDICATION:   sirs   COMPARISON:  Chest radiograph from November 10, 2022 EXAM PERFORMED/VIEWS:  XR CHEST PORTABLE FINDINGS: Cardiomediastinal silhouette appears unremarkable  Mild pulmonary edema  Bibasilar atelectasis  No pneumothorax  Trace layering effusions  Osseous structures appear within normal limits for patient age  Impression: Mild pulmonary edema and trace layering pleural effusions  No definite consolidation  If continued clinical concern, suggest follow-up frontal and lateral views for further evaluation  Workstation performed: [id number]     XR chest portable    Result Date: 11/10/2022  Narrative: CHEST INDICATION:   cough/wheezing  COMPARISON:  CXR 11/8/2022, abdomen CT 11/9/2022, chest CT 2/23/2018  EXAM PERFORMED/VIEWS:  XR CHEST PORTABLE FINDINGS: Cardiomediastinal silhouette appears unremarkable  Mild pulmonary venous congestion  Question trace right effusion  No pneumothorax  Osseous structures appear within normal limits for patient age  Impression: Mild pulmonary venous congestion  Question trace right effusion  Workstation performed: [id number]     XR chest portable    Result Date: 10/31/2022  Narrative: CHEST INDICATION:   new hypoxia, concern for CHF  COMPARISON:  Chest radiograph October 26, 2022 EXAM PERFORMED/VIEWS:  XR CHEST PORTABLE FINDINGS: Heart shadow is enlarged but unchanged from prior exam  Diffuse bilateral reticular opacities have increased since prior study  No definite pleural effusion  No pneumothorax  Osseous structures appear within normal limits for patient age  Impression: Pulmonary edema has increased since October 26, 2022  Workstation performed: [id number]     XR chest portable    Result Date: 10/27/2022  Narrative: CHEST INDICATION:   dyspnea  COMPARISON:  Chest radiograph dated 10/21/2022  EXAM PERFORMED/VIEWS:  XR CHEST PORTABLE FINDINGS: Cardiomediastinal silhouette appears unremarkable  Mild pulmonary vascular congestion  No pneumothorax or pleural effusion  Osseous structures appear within normal limits for patient age       Impression: Mild pulmonary vascular congestion  Workstation performed: [id number]     XR chest 1 view portable    Result Date: 10/22/2022  Narrative: CHEST INDICATION:   OREILLY  COMPARISON:  02/06/2020 EXAM PERFORMED/VIEWS:  XR CHEST PORTABLE 1 image FINDINGS: Cardiomediastinal silhouette appears unremarkable  The lungs are clear  No pneumothorax or pleural effusion  Osseous structures appear within normal limits for patient age  Impression: No acute cardiopulmonary disease  Workstation performed: [id number]     XR foot 3+ views RIGHT    Result Date: 10/22/2022  Narrative: RIGHT FOOT INDICATION:   necrotic R 5th digit  COMPARISON:  None VIEWS:  XR FOOT 3+ VW RIGHT Images: 3 FINDINGS: There is no acute fracture or dislocation  No focal areas of bony destruction  An inferior calcaneal spur is present  Spurring in the midfoot  No lytic or blastic osseous lesion  There are atherosclerotic calcifications  Soft tissue swelling about the 5th toe  Soft tissues are otherwise unremarkable  Impression: No acute osseous abnormality  Workstation performed: [id number]     CT head wo contrast    Result Date: 11/15/2022  Narrative: CT BRAIN - WITHOUT CONTRAST INDICATION:   Stroke, follow up Stroke re eval  COMPARISON:  CTA head and neck with and without contrast November 13, 2022  TECHNIQUE:  CT examination of the brain was performed  In addition to axial images, sagittal and coronal 2D reformatted images were created and submitted for interpretation  Radiation dose length product (DLP) for this visit:  1036 mGy-cm   This examination, like all CT scans performed in the [facility], was performed utilizing techniques to minimize radiation dose exposure, including the use of iterative reconstruction and automated exposure control  IMAGE QUALITY:  Diagnostic  FINDINGS: PARENCHYMA: Evolving acute infarct in right parietal-temporal-occipital lobes with questionable petechial cortical hemorrhage in right parietal lobe    No acute parenchymal hematoma  Decreased attenuation is noted in periventricular and subcortical white matter demonstrating an appearance that is statistically most likely to represent mild microangiopathic change  No intracranial mass, mass effect or midline shift  Arterial calcifications of carotid siphons  VENTRICLES AND EXTRA-AXIAL SPACES:  Normal for the patient's age  VISUALIZED ORBITS AND PARANASAL SINUSES:  Orbits are normal   Small right maxillary retention cyst  CALVARIUM AND EXTRACRANIAL SOFT TISSUES:  Normal      Impression: Evolving acute infarct in right parietal-temporal-occipital lobes with questionable petechial cortical hemorrhage in right parietal lobe  No new acute intracranial abnormality  The study was marked in [facility] for immediate notification  Workstation performed: [id number]     CT head wo contrast    Result Date: 11/13/2022  Narrative: CT BRAIN - WITHOUT CONTRAST INDICATION:   Mental status change, unknown cause change in mental status, on full anticoagulation  COMPARISON:  2/25/2018 TECHNIQUE:  CT examination of the brain was performed  In addition to axial images, sagittal and coronal 2D reformatted images were created and submitted for interpretation  Radiation dose length product (DLP) for this visit:  1838 mGy-cm   This examination, like all CT scans performed in the [facility], was performed utilizing techniques to minimize radiation dose exposure, including the use of iterative reconstruction and automated exposure control  IMAGE QUALITY:  Motion artifact limits portions of the study  Portions of the study were repeated  Some diagnostic information is obtained  FINDINGS: PARENCHYMA:  There is a moderate region of wedge-shaped hypodensity extending from the periventricular margin to the cortical surface predominantly in the right parietal lobe indicative of a moderate, acute/recent infarction  There is no large hemorrhage    There is local sulcal effacement and mass effect without subfalcine herniation  Atherosclerotic calcifications of the cavernous segment of the internal carotid artery are mild  VENTRICLES AND EXTRA-AXIAL SPACES:  Mild effacement of the occipital horn of the right lateral ventricle secondary to adjacent cytotoxic edema in the right parietal lobe  VISUALIZED ORBITS AND PARANASAL SINUSES:  Unremarkable  CALVARIUM AND EXTRACRANIAL SOFT TISSUES:  Normal      Impression: 1  Moderate-sized acute/recent infarction centered on the right parietal lobe with local mass effect  No acute intracranial hemorrhage  Workstation performed: [id number]     CTA ABDOMEN W RUN OFF W WO CONTRAST    Result Date: 11/9/2022  Narrative: CT ANGIOGRAM OF THE AORTA AND LOWER EXTREMITIES WITH IV CONTRAST INDICATION:  Chronic limb ischemia with tissue loss in both legs  Previous endovascular interventions  COMPARISON: To CTA studies and 2 arteriograms since October 27, 2022 TECHNIQUE:  CT angiogram examination of the abdomen, pelvis, and lower extremities was performed according to standard protocol with intravenous contrast   This examination, like all CT scans performed in the [place], was performed utilizing techniques to minimize radiation dose exposure, including the use of iterative reconstruction and automated exposure control  3D reconstructions were performed an independent workstation, and are supplied for review  Rad dose 3041 mGy-cm IV Contrast:  145 mL of iohexol (OMNIPAQUE)  FINDINGS: VASCULAR STRUCTURES:   Proximal right common iliac artery stent does not look opacified on CTA  No other significant common or external iliac lesion on the right side  Right common femoral arteries mildly calcified but without focal stenosis  Common femoral bifurcation filling defect is no longer present  No clear evidence of residual dissection  Right SFA is diffusely small with multiple mild stenoses proximally    Long stent within mid and distal SFA seems patent but unable to determine any residual stenosis due to stent artifact and very small luminal caliber  Popliteal artery is patent with multiple mild stenoses throughout and diffusely small caliber  No focal flow-limiting stenosis is identified  Anterior tibial and posterior tibial arteries are intact to the ankle  Posterior tibial artery occludes proximal to the calcaneus  Dorsalis pedis artery is severely stenotic distally and possibly segmentally occluded  Peroneal artery is patent but communicating arteries are not opacified  In the left leg, there is a calcified stenosis at the origin the left common iliac difficult to quantify  It is not well seen on prior arteriograms  Based on reconstructions the lesion measures 30-40% in severity  No other common or external iliac lesion  No significant left common femoral stenosis  Left SFA is diffusely small and diseased  Recent proximal and distal stenting  Proximal left SFA stent appears patent  Distal stent is occluded  Popliteal artery is severely stenotic distal to the stent  Below-knee popliteal artery is diffusely stenotic with severe stenosis at origin of the anterior tibial artery  Anterior tibial artery is patent  Posterior tibial artery is only segmentally seen proximally and occluded distally  Peroneal artery is patent  Dorsalis pedis arteries patent  OTHER FINDINGS ABDOMEN LOWER CHEST:  Small bilateral pleural effusions  LIVER/BILIARY TREE:  Unremarkable  GALLBLADDER:  No calcified gallstones  No pericholecystic inflammatory change  SPLEEN:  Asymmetric hyperperfusion into the upper pole of uncertain significance  Not seen previously  Maybe related to phase of contrast   Splenic artery is patent  No suggestion of embolic occlusion  PANCREAS:  Unremarkable  ADRENAL GLANDS: Unremarkable  KIDNEYS/URETERS:  No solid renal mass  No hydronephrosis  No urinary tract calculi  PELVIS REPRODUCTIVE ORGANS:  Unremarkable for patient's age   URINARY BLADDER:  Jac[place] in place   Nondistended bladder  ADDITIONAL ABDOMINAL AND PELVIC STRUCTURES STOMACH AND BOWEL:  Unremarkable  ABDOMINOPELVIC CAVITY:   New small volume ascites ABDOMINAL WALL/INGUINAL REGIONS:  Diffuse subcutaneous edema OSSEOUS STRUCTURES:  No acute fracture or destructive osseous lesion     Diffuse subcutaneous edema in both legs  Impression: 1  Suspect interval occlusion of recently placed proximal right common iliac artery stent 2  No residual filling defect or dissection involving right common femoral artery  Diffusely small diseased right SFA and popliteal artery without focal flow-limiting stenosis identified  Patent recently placed SFA stent  3   Intact anterior tibial artery with stenotic and/or occluded dorsalis pedis artery  Posterior tibial artery occludes at the ankle  Patent peroneal artery without opacification of anterior and posterior communicating arteries  4   Near circumferentially calcified stenosis of proximal left common iliac artery  The lesion measures 30-40% in severity by reconstructions which are compromised by artifact and software limitations and therefore difficult to exclude a focal flow-limiting stenosis within this segment  5   No significant left common femoral stenosis  SFA is diffusely diseased  Newly placed proximal stent is patent but distal SFA stent is occluded  Popliteal artery is diffusely stenotic  6   Left popliteal artery is diffusely stenotic which seems new from the prior arteriogram and may be related to acute stent occlusion  Left anterior tibial artery is intact  Posterior tibial artery occludes at the calcaneus  7   Small bilateral pleural effusions 8    Small volume ascites new from prior CT with diffuse subcutaneous edema Workstation performed: [id number]     CTA ABDOMEN W RUN OFF W WO CONTRAST    Result Date: 11/2/2022  Narrative: CT ANGIOGRAM OF THE AORTA AND LOWER EXTREMITIES WITH IV CONTRAST INDICATION:  Recent left lower extremity intervention with new decrease in ankle-brachial indices in the right lower extremity  COMPARISON: CTA, dated 10/27/2022  Fluoroscopy, dated 10/31/2022  TECHNIQUE:  CT angiogram examination of the abdomen, pelvis, and lower extremities was performed according to standard protocol with intravenous contrast   This examination, like all CT scans performed in the [facility], was performed utilizing techniques to minimize radiation dose exposure, including the use of iterative reconstruction and automated exposure control  Rad dose 2576 mGy-cm IV Contrast:  130 mL of iohexol (OMNIPAQUE)  FINDINGS: VASCULAR STRUCTURES:   ABDOMINAL VASCULAR STRUCTURES:   AORTA: The abdominal aorta is normal in caliber  There are moderate atherosclerotic calcifications  CELIAC ARTERY: The origin is normal in caliber  Branching anatomy is conventional   There is no atherosclerotic disease  SUPERIOR MESENTERIC ARTERY: Normal caliber, without atherosclerotic calcifications  INFERIOR MESENTERIC ARTERY: The ostia and visualized branches are normal  RIGHT RENAL ARTERY: The single renal artery is normal in caliber  LEFT RENAL ARTERY: The single renal artery has mild atherosclerotic calcifications at the origin  LEFT LOWER EXTREMITY: LEFT COMMON ILIAC ARTERY: Mild calcific atherosclerotic disease  The vessel is normal in caliber  LEFT INTERNAL ILIAC ARTERY: Mild atherosclerotic disease  Visualized branches are normal in caliber  LEFT EXTERNAL ILIAC ARTERY: Normal in caliber, without atherosclerotic disease  LEFT COMMON FEMORAL ARTERY: Mild calcific atherosclerotic disease  The vessel is normal in caliber  LEFT PROFUNDA FEMORIS: Mild calcific atherosclerotic disease  The vessel is normal in caliber  LEFT SUPERFICIAL FEMORAL ARTERY: Interval stenting and lithotripsy of the proximal and distal left superficial femoral artery  Peak hardening artifact limits evaluation of the intraluminal stent    Remainder of the superficial femoral artery is normal  LEFT POPLITEAL ARTERY: Moderate calcific atherosclerotic disease  The vessel is normal in caliber  LEFT TIBIOPERONEAL TRUNK: Mild calcific atherosclerotic disease  The vessel is normal in caliber  LEFT ANTERIOR TIBIAL ARTERY: Mild calcific atherosclerotic disease  The vessel is normal in caliber  LEFT PERONEAL ARTERY: Mild calcific atherosclerotic disease  The vessel is normal in caliber  LEFT POSTERIOR TIBIAL ARTERY: Mild calcific atherosclerotic disease  The vessel is normal in caliber  LEFT FOOT: Visualized branches are normal  RIGHT LOWER EXTREMITY: RIGHT COMMON ILIAC ARTERY: Interval stenting of the right common iliac artery  Beam hardening artifact limits evaluation of the intraluminal stent  RIGHT INTERNAL ILIAC ARTERY: Mild atherosclerotic disease  Visualized branches are normal in caliber  RIGHT EXTERNAL ILIAC ARTERY: Normal in caliber, without atherosclerotic disease  RIGHT COMMON FEMORAL ARTERY: Interval development of a probable flow-limiting dissection flap at the prior arteriotomy site  Moderate calcific atherosclerotic disease  The vessel is normal in caliber  Atherosclerotic disease  RIGHT PROFUNDA FEMORIS: Normal in caliber, without atherosclerotic disease  RIGHT SUPERFICIAL FEMORAL ARTERY: Focal severe atherosclerotic narrowing at the adductor canal   Otherwise moderate atherosclerotic disease throughout the remainder of the vessel  RIGHT POPLITEAL ARTERY: Moderate calcific atherosclerotic disease  The vessel is normal in caliber  RIGHT TIBIOPERONEAL TRUNK: Mild calcific atherosclerotic disease  The vessel is normal in caliber  RIGHT ANTERIOR TIBIAL ARTERY: Mild calcific atherosclerotic disease  The vessel is normal in caliber  RIGHT PERONEAL ARTERY: Mild calcific atherosclerotic disease  The vessel is normal in caliber  RIGHT POSTERIOR TIBIAL ARTERY: Mild calcific atherosclerotic disease  The vessel is normal in caliber   RIGHT FOOT: Visualized branches are normal  VENOUS: The iliocaval system is normal in caliber, without collateralization  OTHER FINDINGS ABDOMEN LIVER/BILIARY TREE:  Unremarkable  GALLBLADDER:  No calcified gallstones  No pericholecystic inflammatory change  SPLEEN:  Unremarkable  Normal size  PANCREAS:  Unremarkable  ADRENAL GLANDS: Punctate calcification in the lateral christine of the left adrenal gland, benign  Right adrenal gland is normal  KIDNEYS/URETERS:  No solid renal mass  No hydronephrosis  No urinary tract calculi  PELVIS REPRODUCTIVE ORGANS:  Unremarkable for patient's age  URINARY BLADDER:  Small amount of air in the urinary bladder likely reflects prior instrumentation  ADDITIONAL ABDOMINAL AND PELVIC STRUCTURES STOMACH AND BOWEL:  Large bowel is normal   Small bowel is normal   Stomach is normal  ABDOMINOPELVIC CAVITY:   No pathologically enlarged mesenteric or retroperitoneal lymph nodes  No ascites or free intraperitoneal air  ABDOMINAL WALL/INGUINAL REGIONS:  Diffuse anasarca  OSSEOUS STRUCTURES:  No acute fracture or destructive osseous lesion  Impression: Vascular: Left lower extremity: Interval stenting and lithotripsy of the left superficial femoral artery  Right lower extremity: 1  Interval development of a probable flow-limiting dissection flap at the prior arteriotomy site in the right common femoral artery superimposed upon moderate to severe atherosclerotic disease  2   Focal severe atherosclerotic narrowing at the adductor canal with additional moderate atherosclerotic narrowing throughout the remainder of the right superficial femoral artery  The study was marked in EPIC for significant notification  Workstation performed: [id number]     CTA ABDOMEN W RUN OFF W WO CONTRAST    Result Date: 10/28/2022  Narrative: CT ANGIOGRAM OF THE AORTA AND LOWER EXTREMITIES WITH IV CONTRAST INDICATION:  Aortoiliac disease  Nonhealing left heel wound  COMPARISON: CT of the left lower extremity with contrast on 10/21/2022   CT of the abdomen pelvis with contrast on 2/20/2022  TECHNIQUE:  CT angiogram examination of the abdomen, pelvis, and lower extremities was performed according to standard protocol with intravenous contrast   This examination, like all CT scans performed in the [facility], was performed utilizing techniques to minimize radiation dose exposure, including the use of iterative reconstruction and automated exposure control  3D reconstructions were performed an independent workstation, and are supplied for review  Rad dose 2492 mGy-cm IV Contrast:  130 mL of iohexol (OMNIPAQUE)  FINDINGS: VASCULAR STRUCTURES:   Visualized thoracoabdominal aorta is patent  The infrarenal abdominal demonstrates mild stenosis, see series 2 image 48 where atherosclerotic disease encroaches upon the lumen, in this region the aortic diameter measures 8 mm  The celiac artery, SMA, AL and bilateral renal arteries are patent  Accessory renal arteries are present bilaterally  RIGHT: Common iliac artery: 50% stenosis proximally Internal iliac artery and branches: Patent  External iliac artery: Patent  Common femoral artery: 50% stenosis  Profunda femoral artery and branches: Patent  Superficial femoral artery: 5075% stenosis of the proximal several centimeters  The mid to distal SFA demonstrates segmental regions of up to 75% stenosis  Popliteal artery: Scattered stenoses of up to 50-75%  Patent three-vessel runoff extending into the right foot  LEFT: Common iliac artery: 50% stenosis proximally  Internal iliac artery and branches: Patent  External iliac artery: Patent  Profunda femoral artery and branches: Patent  Common femoral artery: No significant stenosis  Superficial femoral artery: The proximal 3rd of the vessel demonstrates up to 75% stenosis  The distal SFA demonstrates up to 75% stenosis as well  Popliteal artery: P2 segment greater than 75% stenosis  Patent three-vessel runoff extending into the left foot   OTHER FINDINGS ABDOMEN LOWER CHEST:  Not visualized  LIVER/BILIARY TREE: Partially visualized  Unremarkable  GALLBLADDER:  No calcified gallstones  No pericholecystic inflammatory change  SPLEEN:  Partially visualized  Unremarkable  Normal size  PANCREAS:  Unremarkable  ADRENAL GLANDS: Unchanged small left lateral limb calcification, unchanged  Right adrenal gland unremarkable  KIDNEYS/URETERS:  No solid renal mass  No hydronephrosis  No urinary tract calculi  Right lateral lower pole cyst  PELVIS REPRODUCTIVE ORGANS:  Unremarkable for patient's age  IUD  URINARY BLADDER:  Antidependent air, correlate for recent instrumentation ADDITIONAL ABDOMINAL AND PELVIC STRUCTURES STOMACH AND BOWEL:  Unremarkable  ABDOMINOPELVIC CAVITY:   No pathologically enlarged mesenteric or retroperitoneal lymph nodes  No ascites or free intraperitoneal air  ABDOMINAL WALL/INGUINAL REGIONS:  Mild anasarca  OSSEOUS STRUCTURES:  No acute fracture or destructive osseous lesion  Impression: 1  Moderate stenosis of the mid infrarenal abdominal aorta secondary to calcific atherosclerotic disease  2  LEFT: Up to 50% stenosis of the proximal common iliac artery  The proximal and distal 3rd of the superficial femoral artery demonstrates 50-75% stenosis  Popliteal artery demonstrates greater than 75% stenosis in the P2 segment  Patent three-vessel runoff extending into the left foot  3  RIGHT: Proximal common iliac artery stenosis of up to 50%  50% stenosis of the common femoral artery  Superficial femoral artery demonstrates up to 50% stenosis within the proximal several centimeters, 50-75% stenosis throughout the distal 3rd of the vessel  Popliteal artery demonstrates scattered stenoses of 50-75%  Patent three-vessel runoff extending into the right foot   Workstation performed: [id number]     MRI ankle/heel left  wo contrast    Result Date: 10/27/2022  Narrative: MRI ANKLE/HEEL LEFT WO CONTRAST INDICATION:   left heel eschar, unknown depth, rule out osteomyelitis  COMPARISON:  Left foot and ankle CT from 10/21/2022  TECHNIQUE:   MR sequences were obtained of the ankle including: Localizers, coronal STIR, coronal T1, axial T2 fat sat, axial PD, sagittal T2 fat sat, sagittal T1 sequences were obtained  Gadolinium was not used  FINDINGS: Exam moderately limited due to patient motion  Subcutaneous Tissues: There is a large plantar lateral heel ulcer, without underlying soft tissue abscess (series 5 image 5-15 ) Joint Effusion: None  TENDONS: Achilles tendon: Normal  Peroneus brevis and longus: Normal  Posterior tibialis, flexor digitorum longus, flexor hallucis longus: Normal  Anterior tibialis, extensor digitorum longus, extensor hallucis longus:  Normal  LIGAMENTS: Lateral collateral ligament complex:  Normal  Distal tibiofibular syndesmosis:  Normal  Medial collateral ligament complex:  Normal  Plantar Fascia:  Normal  Articular Surfaces:  Normal  Sinus Tarsi:  Normal  Tarsal Tunnel: Unremarkable  Bones:  Associated with the plantar lateral heel ulcer, there is only very minimal reactive marrow edema in the calcaneal tubercle evident on T2-weighted sequences (series 3 image 2, series 7 image 8 ) No confluent T1-weighted marrow abnormality or cortical destruction to suggest osteomyelitis  Musculature:  Normal      Impression: Exam moderately limited due to patient motion  There is a large plantar lateral heel ulcer, without underlying soft tissue abscess (series 5 image 5-15 ) Associated with this ulcer, there is only very minimal reactive marrow edema in the calcaneal tubercle evident on T2-weighted sequences (series 3 image 2, series 7 image 8 ) No confluent T1-weighted marrow abnormality or cortical destruction to suggest osteomyelitis  Workstation performed: [id number]     VAS lower limb arterial duplex, complete bilateral    Result Date: 11/9/2022  Narrative:  [facility] REPORT CLINICAL: Indications:  Evaluation s/p intervention   Bilateral lower extremity ulcerations  Operative History: 2022-11-07 Right CFA arterectomy w/  PTA 2022-11-07 Right Popliteal PTA and Posterior tibial PTA 2022-11-07 Right SFA PTA/ stent placement 2022-10-31 Abdominal aortogram 2022-10-31 Right ALLISON Stent 2022-10-31 Right PTA stent 2022-10-31 Left SFA shockwave angioplasty Risk Factors The patient has history of Obesity, HTN, Diabetes, CVA, Hyperlipidemia and previous smoking  Clinical Right Pressure:  188/ mm Hg, Left Pressure: IV  FINDINGS:  Segment                Right            Left                                          PSV (cm/s)  EDV  Impression  PSV (cm/s)  EDV  Common Femoral Artery          81   17                     145   20  Prox Profunda                 174   16                     230   12  Prox SFA                      143   14                      89    8  Mid SFA                        90    9  Occluded             0    0  Dist SFA                       70    8  Occluded             0    0  Proximal Pop                   41   11                      27   11  Distal Pop                     50    5                      20    7  Tibioperoneal                  40                           39       Dist Post Tibial                8    0                       0    0  Dist  Ant  Tibial              21    0                       8    3     CONCLUSION: Impression: RIGHT LOWER LIMB: Monophasic waveforms in the common femoral artery suggesting more proximal disease with diffuse disease throughout the remaining portions of the femoral-popliteal arteries without significant focal stenosis  Findings suggests tibioperoneal disease  Ankle/Brachial index: unobtainable secondary to indistinguishable Doppler signal, previous study 0 51  PVR tracing at the ankle is dampened  The PVR tracing at the metatarsal level and PPG waveform of the great toe are flat lined, suggesting poor distal perfusion, therefore, pressures are unobtainable    LEFT LOWER LIMB: An occlusion of the mid superficial femoral artery with minimal distal reconstitution  Findings suggests tibioperoneal disease  Ankle/Brachial index: unobtainable secondary to indistinguishable Doppler signal, previous study 0 74  The PVR tracings at the ankle and metatarsal levels are flat lined with the PPG waveform of the great toe is flat lined, suggesting poor distal perfusion, therefore, pressures are unobtainable  In comparison to the studies dated 10/24/2022 and 11/1/2022, there is progression in the disease process bilaterally  SIGNATURE: Electronically Signed by: Nikolas Penaloza on 2022-11-09 12:42:16 PM    VAS lower limb arterial duplex, complete bilateral    Result Date: 10/24/2022  Narrative:  [facility] REPORT CLINICAL: Indications:  Left heel ulceration  Risk Factors: Obesity, HTN, Diabetes, CVA, Hyperlipidemia and previous smoking  Clinical Right Pressure:  158/ mm Hg, Left Pressure:  168/ mm Hg  FINDINGS:  Segment                Right       Left                                          PSV (cm/s)  PSV (cm/s)  Common Femoral Artery          72         103  Prox Profunda                  92         164  Prox SFA                       86          45  Mid SFA                        93          60  Dist SFA                       57          97  Proximal Pop                   83          63  Distal Pop                    111          79  Tibioperoneal                 100          96  Dist Post Tibial               38           7  Dist  Ant  Tibial              36          46     CONCLUSION: Impression: RIGHT LOWER LIMB: Monophasic waveforms in the common femoral artery suggesting more proximal disease  Diffuse atherosclerotic disease throughout the femoral-popliteal arteries without focal stenosis  Findings suggests tibioperoneal disease  Ankle/Brachial index:  0 80, moderate claudication range  PVR/ PPG tracings are dampened   Metatarsal pressure of 64 mm Hg Great toe pressure of 61 mm Hg, above healing threshold for a diabetic  LEFT LOWER LIMB: Diffuse atherosclerotic disease throughout the femoral-popliteal arteries without focal stenosis  Findings suggests tibioperoneal disease  Ankle/Brachial index:  0 63, severe claudication range  PVR/ PPG tracings are dampened  Metatarsal pressure of 21 mm Hg Great toe pressure of 35 mm Hg, below healing threshold for a diabetic  SIGNATURE: Electronically Signed by: Misti Diaz on 2022-10-24 02:00:50 PM    VAS lower limb arterial duplex, limited, unilateral    Result Date: 11/2/2022  Narrative:  [facility] REPORT CLINICAL: Indications:  Evaluated the right CFA and ilaic due to SHARIF decrease after angio/ stent  Operative History: 2022-10-31 Abdominal aortogram 2022-10-31 Right ALLISON Stent 2022-10-31 Right PTA stent 2022-10-31 Left SFA shockwave angioplasty Risk Factors The patient has history of Obesity, HTN, Diabetes (IDDM), Hyperlipidemia and previous smoking (quit >10yrs ago)  FINDINGS:  Right                  PSV (cm/s)  Dist EIA                      682  Common Femoral Artery         825  Prox SFA                      391     CONCLUSION:   Impression: There is a high grade stenosis in the right common femoral artery / proximal femoral artery  SIGNATURE: Electronically Signed by: Misti Diaz on 2022-11-02 04:58:20 PM    VAS SHARIF & waveform analysis, multiple levels    Result Date: 11/1/2022  Narrative:  [facility] REPORT CLINICAL: Indications:  SHARIF follow post-op aortogram, LLE run off, PTA stent  Operative History: 2022-10-31 Abdominal aortogram 2022-10-31 Left ALLISON Stent 2022-10-31 Left PTA stent 2022-10-31 Left SFA shockwave angioplasty Risk Factors The patient has history of Obesity, HTN, Diabetes (IDDM), Hyperlipidemia and previous smoking (quit >10yrs ago)  Clinical Right Pressure:  138/ mm Hg, Left Pressure:  158/ mm Hg    FINDINGS:  Segment       Ri  Left                         P    P  Peroneal      76   91  Post  Tibial  81  117 Ankle         81  117  Metatarsal    45   85  Great Toe     50   37     CONCLUSION:  Impression RIGHT LOWER LIMB Ankle/Brachial Index:0 51 wiyh in severe limits (prior 0 63) PPG/PVR Tracings are dampened  Metatarsal Pressure 45 mmHg Great Toe Pressure: 50 mmHg, within the healing range  LEFT LOWER LIMB Ankle/Brachial Index: 0 74 within moderate limits (prior 0 63) PPG/PVR Tracings are dampened  Metatarsal Pressure 85 mmHg Great Toe Pressure: 37 mmHg, below the healing range  SIGNATURE: Electronically Signed by: Marcus Coy on 2022-11-01 03:41:46 PM    VAS lower limb vein mapping bypass graft    Result Date: 11/3/2022  Narrative:  [facility]PORT [facility]INICAL: Indications: Patient presents for a general health evaluation secondary to future open heart surgery  Patient is asymptomatic at this time  Operative History: 2022-10-31 Abdominal aortogram 2022-10-31 Right ALLISON Stent 2022-10-31 Right PTA stent 2022-10-31 Left SFA shockwave angioplasty Risk Factors The patient has history of Obesity, HTN, Diabetes (IDDM), Hyperlipidemia and previous smoking (quit >10yrs ago)  FINDINGS:  Segment         Right        Left                            Diameter AP  Diameter AP  GSV Inguinal            9 8          6 1  GSV Prox Thigh          3 5          4 9  GSV Mid Thigh           3 2          3 0  GSV Dist Thigh          3 0          4 7  GSV Knee                2 9          3 3  GSV Prox Calf           3 4          3 3  GSV Mid Calf            3 4          3 7  GSV Dist Calf           3 5          3 3  SSV Mid Calf            2 5          2 0  SSV Knee                1 9          2 1  SSV Ankle               2 5          2 4     CONCLUSION:  Impression: RIGHT LOWER LIMB: The great saphenous vein is patent  from the groin to the ankle  The vein measures >2mm in caliber from the groin to the distal calf  LEFT LOWER LIMB: The great saphenous vein is patent  from the groin to the ankle   The vein measures >2mm in caliber from the groin to the distal calf  SIGNATURE: Electronically Signed by: Anton Pena on 2022-11-03 09:45:42 AM    IR aortagram with run-off    Result Date: 11/1/2022  Narrative: PROCEDURE: Ultrasound-guided access of the right common femoral artery  Right common femoral artery sheath arteriogram  Aortogram  Left lower extremity runoff arteriogram  Shockwave balloon lithotripsy of proximal SFA  Proximal SFA Rosalinda stent placement  Shockwave balloon lithotripsy of the distal SFA  Distal SFA Rosalinda stent placement  Postintervention SFA and lower leg arteriograms  Right common iliac artery arteriogram  Right common iliac artery Express stent placement  Post stent placement RCIA arteriogram  Successful right common femoral artery closure with a ProGlide device  STAFF: KELLY Hicks  CONTRAST: 180 mL Visapaque FLUOROSCOPY TIME: 34 8 minutes  NUMBER OF IMAGES: Multiple COMPLICATIONS: None  MEDICATIONS: Local lidocaine  Heparin: 6000 units Moderate sedation with fentanyl and Versed was monitored by radiology nursing staff  Monitoring of conscious sedation totaled 180 minutes  INDICATION: Nonhealing left foot /heel wounds  CTA and arterial duplex ultrasound with evidence of significant peripheral vascular disease  PROCEDURE: The patient was placed supine on the procedure table  The right groin was prepped and draped in a sterile manner  Timeout was performed  Ultrasound-guided access of the right common femoral artery was obtained with a micropuncture needle  The access was upsized and a 5 [place] sheath was placed  Access arteriogram was used to confirm that the sheath was not occlusive  Using a SOS flush catheter,  access to the contralateral SFA was obtained with a Bentson wire, over which a 6 [place] by 65 cm destination sheath was placed within the left distal external iliac artery   A limited arteriogram was performed through the sheath at the aortic bifurcation, to confirm that the sheath was not occlusive within the known right common iliac artery proximal lesion  Left lower leg runoff arteriogram was performed, demonstrating a proximal SFA focal severe stenosis secondary to eccentric calcific atherosclerotic disease  A similar lesion was identified near the region of the abductor canal  The remainder of the SFA, popliteal artery are patent without significant stenosis  2 vessel runoff via the anterior tibial and peroneal arteries  A 0 014 wire was then advanced across the proximal SFA lesion  Shockwave balloon lithotripsy was performed for several rounds with a 5 mm x 6 cm Shockwave balloon  Postintervention arteriogram revealed no significant change within the focal eccentric plaque  A 6 mm x 6 cm Rosalinda stent was placed across this region, post dilated with a 5 mm  balloon  At completion no significant residual stenosis  The wire was then advanced across the distal SFA lesion  Shockwave balloon lithotripsy was performed for several rounds as above  This resulted in irregular dissection proximal to the lesion  The dissection as well as residual stenosis was stented with 6mm x 10cm Rosalinda stent postdilated with a 5 mm  balloon  Postintervention arteriogram with no significant residual stenosis  Completion left lower leg and foot arteriograms were performed, demonstrating preserved 2 vessel runoff as described above, with qualitatively increased rate of flow to the foot  The sheath was then retracted to the proximal right common iliac artery  Arteriogram was performed demonstrating up to 75% stenosis secondary to a focal region of eccentric calcific plaque  This was stented with a 7 mm x 17mm Express Stent  Post intervention arteriogram without residual stenosis  Successful access closure with a Proglide device  FINDINGS: 1  Ultrasound demonstrated the right common femoral artery to the patent   2  Left lower leg runoff arteriogram demonstrated a proximal SFA focal severe stenosis secondary to eccentric calcific atherosclerotic disease  A similar lesion was identified near the region of the abductor canal  The remainder of the SFA, popliteal artery are patent without significant stenosis  2 vessel runoff via the anterior tibial and peroneal arteries  Successful SFA shockwave lithotripsy and Rosalinda stent placement x 2 as described above  3  Mid right common iliac artery severe focal stenosis secondary to eccentric calcific plaque  Successful Express stent placement as described above  4  Preserved two vessel runoff on the left via the anterior tibial and peroneal arteries with qualitatively increased rate of flow compared to the preintervention arteriogram      Impression: Left lower leg arteriogram with proximal and distal SFA shockwave balloon lithotripsy and stent placement, as well as right common iliac artery stent placement  PLAN: Follow-up arterial duplex  Patient became short of breath after the procedure, with increased O2 requirement  Etiology most likely volume overload secondary to renal prep as well as contrast and fluid was given during the procedure where she was lying flat  Dr Soledad Alexandre from the primary team made aware  Chest x-ray to be obtained upon arrival to the floor  Workstation performed: [id number]     XR chest portable ICU    Result Date: 11/9/2022  Narrative: CHEST INDICATION:   Increased o2 requirement  COMPARISON:  10/31/2022 EXAM PERFORMED/VIEWS:  XR CHEST PORTABLE ICU FINDINGS:  Patient rotation limits evaluation of the cardiomediastinal structures  Grossly stable cardiomediastinal structures  Central pulmonary vascular congestion with perihilar and basilar interstitial edema  No large effusions  No pneumothorax  No acute osseous abnormality  Stable bony structures  Impression: Moderate pulmonary edema, similar to the prior examination  No large effusions   Workstation performed: [id number]     CT lower extremity w contrast left    Result Date: 10/22/2022  Narrative: CT left lower extremity with IV contrast INDICATION: L foot necrosis  COMPARISON: None TECHNIQUE: CT examination of the above was performed  This examination, like all CT scans performed in the [facility], was performed utilizing techniques to minimize radiation dose exposure, including the use of iterative reconstruction  and automated exposure control software  Sagittal and coronal two dimensional reconstructed images were also submitted for interpretation  IV Contrast: 100 mL of iohexol (OMNIPAQUE) Rad dose  592 mGy-cm FINDINGS: OSSEOUS STRUCTURES:  No acute fracture, dislocation or destructive osseous lesion  Mild plantar calcaneal spurring  Mild degenerative changes of the ankle joint VISUALIZED MUSCULATURE:  Unremarkable  SOFT TISSUES:  Atherosclerosis  Moderate superficial soft tissue edema in the foot, ankle, and leg, superimposed cellulitis considered in the appropriate clinical setting  No discrete drainable collection identified  OTHER PERTINENT FINDINGS:  Enthesopathy of the anterior patella along the extensor mechanism attachment  No discrete subcutaneous gas is identified     Impression: No acute bony process is seen  Moderate superficial soft tissue edema in the foot, ankle, and leg, superimposed cellulitis considered in the appropriate clinical setting  Correlation with patient's symptoms and laboratory values recommended  No discrete drainable collection identified  Other findings as above  Workstation performed: [id number]     MRI follow up msk    Result Date: 10/28/2022  Narrative: MRI LEFT FOOT INDICATION:   Inpatient Order left heel eschar, unknown depth, rule out osteomyelitis  COMPARISON: Correlation is made with the prior CT study dated 10/21/2022  TECHNIQUE:  MR sequences were obtained of the left foot including the following:  Localizer, no further images were obtained as the patient could not tolerate the examination due to pain   Imaging performed on 1 5T MRI Gadolinium was not used FINDINGS: The study is nondiagnostic is only localizer images were obtained  The patient could not continue the examination due to underlying pain  Impression: Nondiagnostic study  Workstation performed: [id number]     Echo complete w/ contrast if indicated    Result Date: 10/30/2022  Narrative: •  Left Ventricle: Left ventricular cavity size is normal  There is mild to moderate concentric hypertrophy  The left ventricular ejection fraction is 60% by visual estimation  Systolic function is normal  Wall motion is normal  Diastolic function is normal  •  Right Ventricle: Right ventricular cavity size is normal  Systolic function is normal  •  Aortic Valve: There is aortic sclerosis without clear visualization of the valve leaflets  •  Mitral Valve: There is mild annular calcification  There is trace regurgitation  •  Tricuspid Valve: There is trace regurgitation  Pulmonary artery systolic pressures are estimated at 31 mm Hg  •  There is no study for comparison  Echo follow up/limited w/ contrast if indicated    Result Date: 11/14/2022  Narrative: •  Left Ventricle: Left ventricular cavity size is normal  Wall thickness is normal  The left ventricular ejection fraction is 61%  Systolic function is normal  Wall motion is normal  Diastolic function is normal  •  Mitral Valve: There is mild annular calcification  Compared to prior echocardiographic study dated 10/30/2022, there is no significant change  Labs and pertinent reports reviewed  This note has been generated by voice recognition software system  Therefore, there may be spelling, grammar, and or syntax errors  Please contact if questions arise

## 2022-12-01 ENCOUNTER — ANESTHESIA (INPATIENT)
Dept: PERIOP | Facility: HOSPITAL | Age: 62
End: 2022-12-01

## 2022-12-01 LAB
ANION GAP SERPL CALCULATED.3IONS-SCNC: 5 MMOL/L (ref 4–13)
APTT PPP: 71 SECONDS (ref 23–37)
BASOPHILS # BLD AUTO: 0.05 THOUSANDS/ÂΜL (ref 0–0.1)
BASOPHILS NFR BLD AUTO: 0 % (ref 0–1)
BUN SERPL-MCNC: 7 MG/DL (ref 5–25)
CALCIUM SERPL-MCNC: 7.4 MG/DL (ref 8.3–10.1)
CHLORIDE SERPL-SCNC: 104 MMOL/L (ref 96–108)
CO2 SERPL-SCNC: 31 MMOL/L (ref 21–32)
CREAT SERPL-MCNC: 0.7 MG/DL (ref 0.6–1.3)
EOSINOPHIL # BLD AUTO: 0.24 THOUSAND/ÂΜL (ref 0–0.61)
EOSINOPHIL NFR BLD AUTO: 2 % (ref 0–6)
ERYTHROCYTE [DISTWIDTH] IN BLOOD BY AUTOMATED COUNT: 15.2 % (ref 11.6–15.1)
GFR SERPL CREATININE-BSD FRML MDRD: 93 ML/MIN/1.73SQ M
GLUCOSE SERPL-MCNC: 103 MG/DL (ref 65–140)
GLUCOSE SERPL-MCNC: 106 MG/DL (ref 65–140)
HCT VFR BLD AUTO: 22.4 % (ref 34.8–46.1)
HCT VFR BLD AUTO: 29.3 % (ref 34.8–46.1)
HGB BLD-MCNC: 6.8 G/DL (ref 11.5–15.4)
HGB BLD-MCNC: 9.1 G/DL (ref 11.5–15.4)
IMM GRANULOCYTES # BLD AUTO: 0.1 THOUSAND/UL (ref 0–0.2)
IMM GRANULOCYTES NFR BLD AUTO: 1 % (ref 0–2)
LYMPHOCYTES # BLD AUTO: 0.84 THOUSANDS/ÂΜL (ref 0.6–4.47)
LYMPHOCYTES NFR BLD AUTO: 7 % (ref 14–44)
MCH RBC QN AUTO: 26.4 PG (ref 26.8–34.3)
MCHC RBC AUTO-ENTMCNC: 30.4 G/DL (ref 31.4–37.4)
MCV RBC AUTO: 87 FL (ref 82–98)
MONOCYTES # BLD AUTO: 0.7 THOUSAND/ÂΜL (ref 0.17–1.22)
MONOCYTES NFR BLD AUTO: 6 % (ref 4–12)
NEUTROPHILS # BLD AUTO: 9.67 THOUSANDS/ÂΜL (ref 1.85–7.62)
NEUTS SEG NFR BLD AUTO: 84 % (ref 43–75)
NRBC BLD AUTO-RTO: 0 /100 WBCS
PLATELET # BLD AUTO: 326 THOUSANDS/UL (ref 149–390)
PMV BLD AUTO: 8.4 FL (ref 8.9–12.7)
POTASSIUM SERPL-SCNC: 3.4 MMOL/L (ref 3.5–5.3)
RBC # BLD AUTO: 2.58 MILLION/UL (ref 3.81–5.12)
SODIUM SERPL-SCNC: 140 MMOL/L (ref 135–147)
WBC # BLD AUTO: 11.6 THOUSAND/UL (ref 4.31–10.16)

## 2022-12-01 PROCEDURE — 0HDHXZZ EXTRACTION OF RIGHT UPPER LEG SKIN, EXTERNAL APPROACH: ICD-10-PCS | Performed by: SURGERY

## 2022-12-01 PROCEDURE — 27590 AMPUTATE LEG AT THIGH: CPT | Performed by: PHYSICIAN ASSISTANT

## 2022-12-01 PROCEDURE — 80048 BASIC METABOLIC PNL TOTAL CA: CPT | Performed by: INTERNAL MEDICINE

## 2022-12-01 PROCEDURE — 85014 HEMATOCRIT: CPT | Performed by: PHYSICIAN ASSISTANT

## 2022-12-01 PROCEDURE — 11042 DBRDMT SUBQ TIS 1ST 20SQCM/<: CPT | Performed by: PHYSICIAN ASSISTANT

## 2022-12-01 PROCEDURE — 88311 DECALCIFY TISSUE: CPT | Performed by: PATHOLOGY

## 2022-12-01 PROCEDURE — 85018 HEMOGLOBIN: CPT | Performed by: PHYSICIAN ASSISTANT

## 2022-12-01 PROCEDURE — 88307 TISSUE EXAM BY PATHOLOGIST: CPT | Performed by: PATHOLOGY

## 2022-12-01 PROCEDURE — 27590 AMPUTATE LEG AT THIGH: CPT | Performed by: SURGERY

## 2022-12-01 PROCEDURE — 99024 POSTOP FOLLOW-UP VISIT: CPT | Performed by: SURGERY

## 2022-12-01 PROCEDURE — 0Y6C0Z3 DETACHMENT AT RIGHT UPPER LEG, LOW, OPEN APPROACH: ICD-10-PCS | Performed by: SURGERY

## 2022-12-01 PROCEDURE — 82948 REAGENT STRIP/BLOOD GLUCOSE: CPT

## 2022-12-01 PROCEDURE — 85025 COMPLETE CBC W/AUTO DIFF WBC: CPT | Performed by: INTERNAL MEDICINE

## 2022-12-01 PROCEDURE — 11045 DBRDMT SUBQ TISS EACH ADDL: CPT | Performed by: SURGERY

## 2022-12-01 PROCEDURE — P9016 RBC LEUKOCYTES REDUCED: HCPCS

## 2022-12-01 PROCEDURE — 85730 THROMBOPLASTIN TIME PARTIAL: CPT | Performed by: INTERNAL MEDICINE

## 2022-12-01 PROCEDURE — 99232 SBSQ HOSP IP/OBS MODERATE 35: CPT | Performed by: INTERNAL MEDICINE

## 2022-12-01 PROCEDURE — 11045 DBRDMT SUBQ TISS EACH ADDL: CPT | Performed by: PHYSICIAN ASSISTANT

## 2022-12-01 PROCEDURE — 11042 DBRDMT SUBQ TIS 1ST 20SQCM/<: CPT | Performed by: SURGERY

## 2022-12-01 RX ORDER — KETAMINE HYDROCHLORIDE 100 MG/ML
INJECTION, SOLUTION INTRAMUSCULAR; INTRAVENOUS AS NEEDED
Status: DISCONTINUED | OUTPATIENT
Start: 2022-12-01 | End: 2022-12-01

## 2022-12-01 RX ORDER — PROPOFOL 10 MG/ML
INJECTION, EMULSION INTRAVENOUS AS NEEDED
Status: DISCONTINUED | OUTPATIENT
Start: 2022-12-01 | End: 2022-12-01

## 2022-12-01 RX ORDER — FENTANYL CITRATE/PF 50 MCG/ML
50 SYRINGE (ML) INJECTION
Status: DISCONTINUED | OUTPATIENT
Start: 2022-12-01 | End: 2022-12-01 | Stop reason: HOSPADM

## 2022-12-01 RX ORDER — MIDAZOLAM HYDROCHLORIDE 2 MG/2ML
INJECTION, SOLUTION INTRAMUSCULAR; INTRAVENOUS AS NEEDED
Status: DISCONTINUED | OUTPATIENT
Start: 2022-12-01 | End: 2022-12-01

## 2022-12-01 RX ORDER — HYDROMORPHONE HCL/PF 1 MG/ML
0.5 SYRINGE (ML) INJECTION
Status: COMPLETED | OUTPATIENT
Start: 2022-12-01 | End: 2022-12-01

## 2022-12-01 RX ORDER — CEFAZOLIN SODIUM 2 G/50ML
SOLUTION INTRAVENOUS AS NEEDED
Status: DISCONTINUED | OUTPATIENT
Start: 2022-12-01 | End: 2022-12-01

## 2022-12-01 RX ORDER — SODIUM CHLORIDE 9 MG/ML
INJECTION, SOLUTION INTRAVENOUS CONTINUOUS PRN
Status: DISCONTINUED | OUTPATIENT
Start: 2022-12-01 | End: 2022-12-01

## 2022-12-01 RX ORDER — FENTANYL CITRATE 50 UG/ML
INJECTION, SOLUTION INTRAMUSCULAR; INTRAVENOUS AS NEEDED
Status: DISCONTINUED | OUTPATIENT
Start: 2022-12-01 | End: 2022-12-01

## 2022-12-01 RX ORDER — ONDANSETRON 2 MG/ML
INJECTION INTRAMUSCULAR; INTRAVENOUS AS NEEDED
Status: DISCONTINUED | OUTPATIENT
Start: 2022-12-01 | End: 2022-12-01

## 2022-12-01 RX ORDER — ONDANSETRON 2 MG/ML
4 INJECTION INTRAMUSCULAR; INTRAVENOUS ONCE AS NEEDED
Status: DISCONTINUED | OUTPATIENT
Start: 2022-12-01 | End: 2022-12-01 | Stop reason: HOSPADM

## 2022-12-01 RX ORDER — LORAZEPAM 2 MG/ML
0.5 INJECTION INTRAMUSCULAR ONCE
Status: COMPLETED | OUTPATIENT
Start: 2022-12-01 | End: 2022-12-01

## 2022-12-01 RX ORDER — HYDROMORPHONE HCL/PF 1 MG/ML
SYRINGE (ML) INJECTION AS NEEDED
Status: DISCONTINUED | OUTPATIENT
Start: 2022-12-01 | End: 2022-12-01

## 2022-12-01 RX ADMIN — HYDROMORPHONE HYDROCHLORIDE 0.5 MG: 1 INJECTION, SOLUTION INTRAMUSCULAR; INTRAVENOUS; SUBCUTANEOUS at 12:37

## 2022-12-01 RX ADMIN — DOXAZOSIN 2 MG: 2 TABLET ORAL at 10:35

## 2022-12-01 RX ADMIN — CEFAZOLIN SODIUM 2000 MG: 2 SOLUTION INTRAVENOUS at 10:35

## 2022-12-01 RX ADMIN — INSULIN GLARGINE 10 UNITS: 100 INJECTION, SOLUTION SUBCUTANEOUS at 21:21

## 2022-12-01 RX ADMIN — SODIUM CHLORIDE: 0.9 INJECTION, SOLUTION INTRAVENOUS at 11:33

## 2022-12-01 RX ADMIN — FENTANYL CITRATE 50 MCG: 50 INJECTION INTRAMUSCULAR; INTRAVENOUS at 11:41

## 2022-12-01 RX ADMIN — NYSTATIN: 100000 POWDER TOPICAL at 17:13

## 2022-12-01 RX ADMIN — GABAPENTIN 200 MG: 100 CAPSULE ORAL at 10:34

## 2022-12-01 RX ADMIN — SENNOSIDES 8.6 MG: 8.6 TABLET, FILM COATED ORAL at 10:35

## 2022-12-01 RX ADMIN — SENNOSIDES 8.6 MG: 8.6 TABLET, FILM COATED ORAL at 17:15

## 2022-12-01 RX ADMIN — KETAMINE HYDROCHLORIDE 25 MG: 100 INJECTION, SOLUTION, CONCENTRATE INTRAMUSCULAR; INTRAVENOUS at 11:50

## 2022-12-01 RX ADMIN — QUETIAPINE FUMARATE 25 MG: 25 TABLET ORAL at 21:21

## 2022-12-01 RX ADMIN — PANTOPRAZOLE SODIUM 40 MG: 40 TABLET, DELAYED RELEASE ORAL at 05:09

## 2022-12-01 RX ADMIN — GABAPENTIN 200 MG: 100 CAPSULE ORAL at 21:21

## 2022-12-01 RX ADMIN — FENTANYL CITRATE 50 MCG: 50 INJECTION INTRAMUSCULAR; INTRAVENOUS at 11:08

## 2022-12-01 RX ADMIN — FENTANYL CITRATE 50 MCG: 50 INJECTION INTRAMUSCULAR; INTRAVENOUS at 14:20

## 2022-12-01 RX ADMIN — HYDROMORPHONE HYDROCHLORIDE 0.5 MG: 1 INJECTION, SOLUTION INTRAMUSCULAR; INTRAVENOUS; SUBCUTANEOUS at 15:11

## 2022-12-01 RX ADMIN — ACETAMINOPHEN 650 MG: 325 TABLET, FILM COATED ORAL at 17:15

## 2022-12-01 RX ADMIN — POTASSIUM CHLORIDE 40 MEQ: 1500 TABLET, EXTENDED RELEASE ORAL at 10:34

## 2022-12-01 RX ADMIN — KETAMINE HYDROCHLORIDE 25 MG: 100 INJECTION, SOLUTION, CONCENTRATE INTRAMUSCULAR; INTRAVENOUS at 12:26

## 2022-12-01 RX ADMIN — POLYETHYLENE GLYCOL 3350 17 G: 17 POWDER, FOR SOLUTION ORAL at 10:36

## 2022-12-01 RX ADMIN — SODIUM CHLORIDE 125 ML/HR: 0.9 INJECTION, SOLUTION INTRAVENOUS at 05:12

## 2022-12-01 RX ADMIN — HYDROMORPHONE HYDROCHLORIDE 0.5 MG: 1 INJECTION, SOLUTION INTRAMUSCULAR; INTRAVENOUS; SUBCUTANEOUS at 15:21

## 2022-12-01 RX ADMIN — ACETAMINOPHEN 650 MG: 325 TABLET, FILM COATED ORAL at 05:09

## 2022-12-01 RX ADMIN — AMLODIPINE BESYLATE 10 MG: 10 TABLET ORAL at 10:35

## 2022-12-01 RX ADMIN — CHLORHEXIDINE GLUCONATE 0.12% ORAL RINSE 15 ML: 1.2 LIQUID ORAL at 10:34

## 2022-12-01 RX ADMIN — NYSTATIN: 100000 POWDER TOPICAL at 10:36

## 2022-12-01 RX ADMIN — HYDROMORPHONE HYDROCHLORIDE 0.5 MG: 1 INJECTION, SOLUTION INTRAMUSCULAR; INTRAVENOUS; SUBCUTANEOUS at 15:30

## 2022-12-01 RX ADMIN — CEFAZOLIN SODIUM 2000 MG: 2 SOLUTION INTRAVENOUS at 11:59

## 2022-12-01 RX ADMIN — PROPOFOL 200 MG: 10 INJECTION, EMULSION INTRAVENOUS at 11:44

## 2022-12-01 RX ADMIN — ARGATROBAN 0.02 MCG/KG/MIN: 50 INJECTION, SOLUTION INTRAVENOUS at 05:09

## 2022-12-01 RX ADMIN — HYDROMORPHONE HYDROCHLORIDE 0.5 MG: 1 INJECTION, SOLUTION INTRAMUSCULAR; INTRAVENOUS; SUBCUTANEOUS at 15:51

## 2022-12-01 RX ADMIN — POTASSIUM CHLORIDE 40 MEQ: 1500 TABLET, EXTENDED RELEASE ORAL at 17:15

## 2022-12-01 RX ADMIN — CLOPIDOGREL BISULFATE 75 MG: 75 TABLET ORAL at 10:35

## 2022-12-01 RX ADMIN — SERTRALINE HYDROCHLORIDE 150 MG: 100 TABLET ORAL at 10:48

## 2022-12-01 RX ADMIN — LORAZEPAM 0.5 MG: 2 INJECTION INTRAMUSCULAR; INTRAVENOUS at 15:32

## 2022-12-01 RX ADMIN — FENTANYL CITRATE 50 MCG: 50 INJECTION INTRAMUSCULAR; INTRAVENOUS at 14:44

## 2022-12-01 RX ADMIN — MIDAZOLAM 2 MG: 1 INJECTION INTRAMUSCULAR; INTRAVENOUS at 11:08

## 2022-12-01 RX ADMIN — HYDROMORPHONE HYDROCHLORIDE 0.5 MG: 1 INJECTION, SOLUTION INTRAMUSCULAR; INTRAVENOUS; SUBCUTANEOUS at 12:41

## 2022-12-01 RX ADMIN — ONDANSETRON 4 MG: 2 INJECTION INTRAMUSCULAR; INTRAVENOUS at 13:46

## 2022-12-01 NOTE — ASSESSMENT & PLAN NOTE
· Positive heparin antibody on 11/11/2020  · Platelet count has normalized while on argatroban infusion  · Argatroban held for surgery today  · Discussed with vascular surgery regarding eventual resumption of argatroban    They will order this when deemed stable from a postoperative standpoint

## 2022-12-01 NOTE — ANESTHESIA POSTPROCEDURE EVALUATION
Post-Op Assessment Note    CV Status:  Stable    Pain management: adequate     Mental Status:  Alert and awake   Hydration Status:  Euvolemic   PONV Controlled:  Controlled   Airway Patency:  Patent      Post Op Vitals Reviewed: Yes      Staff: Anesthesiologist, CRNA   Reason for prolonged intubation > 24 hours:  Patient extubatedReason for prolonged intubation > 48 hours:  Patient extubated      No notable events documented      BP      Temp      Pulse     Resp      SpO2      /70   Pulse 92   Temp 98 6 °F (37 °C)   Resp 18   Ht 5' 4" (1 626 m)   Wt 89 2 kg (196 lb 10 4 oz)   SpO2 97%   BMI 33 76 kg/m²

## 2022-12-01 NOTE — PLAN OF CARE
Problem: Potential for Falls  Goal: Patient will remain free of falls  Description: INTERVENTIONS:  - Educate patient/family on patient safety including physical limitations  - Instruct patient to call for assistance with activity   - Consult OT/PT to assist with strengthening/mobility   - Keep Call bell within reach  - Keep bed low and locked with side rails adjusted as appropriate  - Keep care items and personal belongings within reach  - Initiate and maintain comfort rounds  - Make Fall Risk Sign visible to staff  - Offer Toileting every 2 Hours, in advance of need  - Initiate/Maintain bed  chair alarm  - Obtain necessary fall risk management equipment: bed chair alarm, call bell, gripper sock, walker, bedside commode  - Apply yellow socks and bracelet for high fall risk patients  - Consider moving patient to room near nurses station  Outcome: Progressing     Problem: MOBILITY - ADULT  Goal: Maintain or return to baseline ADL function  Description: INTERVENTIONS:  -  Assess patient's ability to carry out ADLs; assess patient's baseline for ADL function and identify physical deficits which impact ability to perform ADLs (bathing, care of mouth/teeth, toileting, grooming, dressing, etc )  - Assess/evaluate cause of self-care deficits   - Assess range of motion  - Assess patient's mobility; develop plan if impaired  - Assess patient's need for assistive devices and provide as appropriate  - Encourage maximum independence but intervene and supervise when necessary  - Involve family in performance of ADLs  - Assess for home care needs following discharge   - Consider OT consult to assist with ADL evaluation and planning for discharge  - Provide patient education as appropriate  Outcome: Progressing  Goal: Maintains/Returns to pre admission functional level  Description: INTERVENTIONS:  - Perform BMAT or MOVE assessment daily    - Set and communicate daily mobility goal to care team and patient/family/caregiver     - Collaborate with rehabilitation services on mobility goals if consulted  - Assist patient in repositioning every 2 hours    - Dangle patient 3 times a day  - Stand patient 3 times a day  - Out of bed to chair as tolerated  - Out of bed for meals  - Out of bed for toileting  - Record patient progress and toleration of activity level   Outcome: Progressing     Problem: PAIN - ADULT  Goal: Verbalizes/displays adequate comfort level or baseline comfort level  Description: Interventions:  - Encourage patient to monitor pain and request assistance  - Assess pain using appropriate pain scale  - Administer analgesics based on type and severity of pain and evaluate response  - Implement non-pharmacological measures as appropriate and evaluate response  - Consider cultural and social influences on pain and pain management  - Notify physician/advanced practitioner if interventions unsuccessful or patient reports new pain  Outcome: Progressing     Problem: INFECTION - ADULT  Goal: Absence or prevention of progression during hospitalization  Description: INTERVENTIONS:  - Assess and monitor for signs and symptoms of infection  - Monitor lab/diagnostic results  - Monitor all insertion sites, i e  indwelling lines, tubes, and drains  - Administer medications as ordered  - Instruct and encourage patient and family to use good hand hygiene technique  Outcome: Progressing     Problem: SAFETY ADULT  Goal: Patient will remain free of falls  Description: INTERVENTIONS:  - Educate patient/family on patient safety including physical limitations  - Instruct patient to call for assistance with activity   - Consult OT/PT to assist with strengthening/mobility   - Keep Call bell within reach  - Keep bed low and locked with side rails adjusted as appropriate  - Keep care items and personal belongings within reach  - Initiate and maintain comfort rounds  - Make Fall Risk Sign visible to staff  - Offer Toileting every 2 Hours, in advance of need  - Initiate/Maintain bed  chair alarm  - Obtain necessary fall risk management equipment: bed chair alarm, call bell, gripper sock, walker, bedside commode  - Apply yellow socks and bracelet for high fall risk patients  - Consider moving patient to room near nurses station  Outcome: Progressing  Goal: Maintain or return to baseline ADL function  Description: INTERVENTIONS:  -  Assess patient's ability to carry out ADLs; assess patient's baseline for ADL function and identify physical deficits which impact ability to perform ADLs (bathing, care of mouth/teeth, toileting, grooming, dressing, etc )  - Assess/evaluate cause of self-care deficits   - Assess range of motion  - Assess patient's mobility; develop plan if impaired  - Assess patient's need for assistive devices and provide as appropriate  - Encourage maximum independence but intervene and supervise when necessary  - Involve family in performance of ADLs  - Assess for home care needs following discharge   - Consider OT consult to assist with ADL evaluation and planning for discharge  - Provide patient education as appropriate  Outcome: Progressing  Goal: Maintains/Returns to pre admission functional level  Description: INTERVENTIONS:  - Perform BMAT or MOVE assessment daily    - Set and communicate daily mobility goal to care team and patient/family/caregiver  - Collaborate with rehabilitation services on mobility goals if consulted  - Assist patient in repositioning every 2 hours    - Dangle patient 3 times a day  - Stand patient 3 times a day  - Out of bed to chair as tolerated  - Out of bed for meals  - Out of bed for toileting  - Record patient progress and toleration of activity level   Outcome: Progressing     Problem: DISCHARGE PLANNING  Goal: Discharge to home or other facility with appropriate resources  Description: INTERVENTIONS:  - Identify barriers to discharge w/patient   - Arrange for needed discharge resources and transportation as appropriate  - Identify discharge learning needs  - Refer to Case Management Department for coordinating discharge planning if the patient needs post-hospital services based on physician/advanced practitioner order   Outcome: Progressing     Problem: Knowledge Deficit  Goal: Patient/family/caregiver demonstrates understanding of disease process, treatment plan, medications, and discharge instructions  Description: Complete learning assessment and assess knowledge base    Interventions:  - Provide teaching at level of understanding  - Provide teaching via preferred learning methods  Outcome: Progressing     Problem: METABOLIC, FLUID AND ELECTROLYTES - ADULT  Goal: Electrolytes maintained within normal limits  Description: INTERVENTIONS:  - Monitor labs and assess patient for signs and symptoms of electrolyte imbalances  - Administer electrolyte replacement as ordered  - Monitor response to electrolyte replacements, including repeat lab results as appropriate  - Instruct patient on fluid and nutrition as appropriate  Outcome: Progressing  Goal: Fluid balance maintained  Description: INTERVENTIONS:  - Monitor labs   - Monitor I/O and WT  - Instruct patient on fluid and nutrition as appropriate  - Assess for signs & symptoms of volume excess or deficit  Outcome: Progressing  Goal: Glucose maintained within target range  Description: INTERVENTIONS:  - Monitor Blood Glucose as ordered  - Assess for signs and symptoms of hyperglycemia and hypoglycemia  - Administer ordered medications to maintain glucose within target range  - Assess nutritional intake and initiate nutrition service referral as needed  Outcome: Progressing     Problem: SKIN/TISSUE INTEGRITY - ADULT  Goal: Skin Integrity remains intact(Skin Breakdown Prevention)  Description: Assess:  -Perform Nicanor assessment every shift  -Clean and moisturize skin every shift  -Inspect skin when repositioning, toileting, and assisting with ADLS  -Assess under medical devices   -Assess extremities for adequate circulation and sensation     Bed Management:  -Have minimal linens on bed & keep smooth, unwrinkled  -Change linens as needed when moist or perspiring  -Avoid sitting or lying in one position for more than 2 hours while in bed    Toileting:  -Offer bedside commode  -Assess for incontinence every 2 hours  -Use incontinent care products after each incontinent episode     Activity:  -Mobilize patient 3 times a day  -Encourage or provide ROM exercises   -Turn and reposition patient every 2 Hours  -Use appropriate equipment to lift or move patient in bed  -Instruct/ Assist with weight shifting every 15 minutes when out of bed in chair  -Consider limitation of chair time 1 hour intervals    Skin Care:  -Avoid use of baby powder, tape, friction and shearing, hot water or constrictive clothing  -Relieve pressure over bony prominences using waffle cushion when in chair  -Do not massage red bony areas    Outcome: Progressing  Goal: Incision(s), wounds(s) or drain site(s) healing without S/S of infection  Description: INTERVENTIONS  - Assess and document dressing, incision, wound bed, drain sites and surrounding tissue  - Provide patient and family education  - Perform skin care/dressing changes everyday as ordered  Outcome: Progressing  Goal: Pressure injury heals and does not worsen  Description: Interventions:  - Implement low air loss mattress or specialty surface (Criteria met)  - Apply silicone foam dressing  - Instruct/assist with weight shifting every 15 minutes when in chair   - Limit chair time to 1 hour intervals  - Use special pressure reducing interventions such as waffle cushion when in chair   - Perform passive or active ROM   - Turn and reposition patient & offload bony prominences every 2 hours   - Utilize friction reducing device or surface for transfers   - Consider consults to  interdisciplinary teams such as wound care, PT/OT  - Use incontinent care products after each incontinent episode   - Consider nutrition services referral as needed  Outcome: Progressing     Problem: MUSCULOSKELETAL - ADULT  Goal: Maintain or return mobility to safest level of function  Description: INTERVENTIONS:  - Assess patient's ability to carry out ADLs; assess patient's baseline for ADL function and identify physical deficits which impact ability to perform ADLs (bathing, care of mouth/teeth, toileting, grooming, dressing, etc )  - Assess/evaluate cause of self-care deficits   - Assess range of motion  - Assess patient's mobility  - Assess patient's need for assistive devices and provide as appropriate  - Encourage maximum independence but intervene and supervise when necessary  - Involve family in performance of ADLs  - Assess for home care needs following discharge   - Consider OT consult to assist with ADL evaluation and planning for discharge  - Provide patient education as appropriate  Outcome: Progressing  Goal: Maintain proper alignment of affected body part  Description: INTERVENTIONS:  - Support, maintain and protect limb and body alignment  - Provide patient/ family with appropriate education  Outcome: Progressing     Problem: CARDIOVASCULAR - ADULT  Goal: Maintains optimal cardiac output and hemodynamic stability  Description: INTERVENTIONS:  - Monitor I/O, vital signs and rhythm  - Monitor for S/S and trends of decreased cardiac output  - Administer and titrate ordered vasoactive medications to optimize hemodynamic stability  - Assess quality of pulses, skin color and temperature  - Assess for signs of decreased coronary artery perfusion  - Instruct patient to report change in severity of symptoms  Outcome: Progressing     Problem: RESPIRATORY - ADULT  Goal: Achieves optimal ventilation and oxygenation  Description: INTERVENTIONS:  - Assess for changes in respiratory status  - Assess for changes in mentation and behavior  - Position to facilitate oxygenation and minimize respiratory effort  - Oxygen administered by appropriate delivery if ordered  - Initiate smoking cessation education as indicated  - Encourage broncho-pulmonary hygiene including cough, deep breathe, Incentive Spirometry  - Assess the need for suctioning and aspirate as needed  - Assess and instruct to report SOB or any respiratory difficulty  - Respiratory Therapy support as indicated  Outcome: Progressing     Problem: Prexisting or High Potential for Compromised Skin Integrity  Goal: Skin integrity is maintained or improved  Description: INTERVENTIONS:  - Identify patients at risk for skin breakdown  - Assess and monitor skin integrity  - Assess and monitor nutrition and hydration status  - Monitor labs   - Assess for incontinence   - Turn and reposition patient  - Assist with mobility/ambulation  - Relieve pressure over bony prominences  - Avoid friction and shearing  - Provide appropriate hygiene as needed including keeping skin clean and dry  - Evaluate need for skin moisturizer/barrier cream  - Collaborate with interdisciplinary team   - Patient/family teaching  - Consider wound care consult   Outcome: Progressing     Problem: Nutrition/Hydration-ADULT  Goal: Nutrient/Hydration intake appropriate for improving, restoring or maintaining nutritional needs  Description: Monitor and assess patient's nutrition/hydration status for malnutrition  Collaborate with interdisciplinary team and initiate plan and interventions as ordered  Monitor patient's weight and dietary intake as ordered or per policy  Utilize nutrition screening tool and intervene as necessary  Determine patient's food preferences and provide high-protein, high-caloric foods as appropriate       INTERVENTIONS:  - Monitor oral intake, urinary output, labs, and treatment plans  - Assess nutrition and hydration status and recommend course of action  - Evaluate amount of meals eaten  - Assist patient with eating if necessary   - Allow adequate time for meals  - Recommend/ encourage appropriate diets, oral nutritional supplements, and vitamin/mineral supplements  - Order, calculate, and assess calorie counts as needed  - Recommend, monitor, and adjust tube feedings and TPN/PPN based on assessed needs  - Assess need for intravenous fluids  - Provide specific nutrition/hydration education as appropriate  - Include patient/family/caregiver in decisions related to nutrition  Outcome: Progressing     Problem: GENITOURINARY - ADULT  Goal: Maintains or returns to baseline urinary function  Description: INTERVENTIONS:  - Assess urinary function  - Encourage oral fluids to ensure adequate hydration if ordered  - Administer IV fluids as ordered to ensure adequate hydration  - Administer ordered medications as needed  - Offer frequent toileting  - Follow urinary retention protocol if ordered  Outcome: Progressing  Goal: Absence of urinary retention  Description: INTERVENTIONS:  - Assess patient’s ability to void and empty bladder  - Monitor I/O  - Bladder scan as needed  - Discuss with physician/AP medications to alleviate retention as needed  - Discuss catheterization for long term situations as appropriate  Outcome: Progressing  Goal: Urinary catheter remains patent  Description: INTERVENTIONS:  - Assess patency of urinary catheter  - If patient has a chronic pa, consider changing catheter if non-functioning  - Follow guidelines for intermittent irrigation of non-functioning urinary catheter  Outcome: Progressing

## 2022-12-01 NOTE — WOUND OSTOMY CARE
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 per surgery team   1   Right medial/lateral leg--not assessed at this time  Patient tentatively for OR today for BKA vs AKA  Dressings dry and intact  2   Right foot--toes are black/brown and necrotic  Foot dressing intact, not assessed at this time  3  MASD/intertriginous dermatitis to anterior and left abdominal fold--RESOLVED  4  Left groin puncture site--yellow slough, scant serous drainage  Michaela-wound pink  No induration      See flowsheet for wound details      Wound Care Plan:   1-P500 low air-loss mattress  2-Elevate right heel/leg off of bed/chair surface to offload pressure  3-Turn/reposition every 2 hours while in bed using positioning wedges for pressure re-distribution on skin    4-Left AKA and right foot--per podiatry and vascular team   5-Left groin puncture site--cleanse with normal saline, pat dry  Apply Maxorb Ag and dry dressing (lay into fold)  Change dressing daily and as needed with soilage  6-Right thigh/leg--cleanse with normal saline, pat dry   Apply Xeroform to medial/lateral thigh eschars, and adaptic to lower leg open areas/blisters  Cover with ABDs and wrap with rolled gauze  7-Apply Allevyn Life foam dressing to midline sacrum (small sacral) for prevention  Scotty with P   Peel back at least daily for skin assessment and re-apply  Change dressing every 3 days and PRN      Wound care team to follow       Jose QUIROGA, RN, [employer]

## 2022-12-01 NOTE — ASSESSMENT & PLAN NOTE
Lab Results   Component Value Date    HGBA1C 9 8 (H) 10/25/2022     Recent Labs     11/30/22  1104 11/30/22  1601 11/30/22 2127 12/01/22  0816   POCGLU 82 103 108 103     Stable  Monitor postop    Watch for hypoglycemia

## 2022-12-01 NOTE — PROGRESS NOTES
[facility]  Progress Note - [address] [facility] [address] 1960, 65 y o  female MRN: [id number]  Unit/Bed#: OR [place] Encounter: [id number]  Primary Care Provider: ALEX Redmond   Date and time admitted to hospital: 10/21/2022  7:58 PM    * PAD (peripheral artery disease) [facility])  Assessment & Plan  · Initially admitted for nonhealing wound of the left lower extremity, requiring multiple procedures ultimately required left AKA on 11/23/2022  · For right AKA today per vascular surgery  · Will need postop pain control, wound care, close monitoring of respiratory status and blood counts  · She has a Jacinto catheter mainly to prevent contamination of her stump wound  · Will have to determine if this can be removed without endangering the AKA stumps for if it has to stay    Stroke [facility])  Assessment & Plan  · Noted to have change in mental status on 11/13/2022  · CT confirmed moderate right parietal lobe stroke, likely embolic in the setting of HIT  · Treated with argatroban infusion, currently on hold for surgery  · Discussed with vascular surgery regarding eventual resumption of argatroban    HIT (heparin-induced thrombocytopenia)  Assessment & Plan  · Positive heparin antibody on 11/11/2020  · Platelet count has normalized while on argatroban infusion  · Argatroban held for surgery today  · Discussed with vascular surgery regarding eventual resumption of argatroban  They will order this when deemed stable from a postoperative standpoint      Type 2 diabetes mellitus with hyperglycemia, with long-term current use of insulin [facility])  Assessment & Plan  Lab Results   Component Value Date    HGBA1C 9 8 (H) 10/25/2022     Recent Labs     11/30/22  1104 11/30/22  1601 11/30/22  2127 12/01/22  0816   POCGLU 82 103 108 103     Stable  Monitor postop    Watch for hypoglycemia    Acute on chronic anemia  Assessment & Plan  Monitor and transfuse as needed    Recent Labs     11/29/22  0913 12/01/22  0519 HGB 7 6* 6 8*     Transfuse for hemoglobin 6 8  Patient currently in OR  Discussed with vascular surgery  Blood transfusion was ordered earlier this morning but was not given  The nurse asked for a new consent since her blood consent was a "month old"  However she also had a consent for surgery which included a new blood consent  Either way I did discuss blood transfusion with the patient and her daughter  New consent was signed by daughter  Benign essential hypertension  Assessment & Plan  Continue Norvasc 10 mg daily and Cardura 2 mg daily    Depression, recurrent (HCC)  Assessment & Plan  Evaluated by Psychiatry on this admission  · Continue Zoloft 150 mg daily / Quetiapine 25mg po Qhs    Acute respiratory failure with hypoxia ([address] )  Assessment & Plan  Chest x-ray on 11/27 showed mild pulmonary vascular congestion  In addition she likely has atelectasis, hypoventilation and being sedentary contributing to her hypoxemia  Lasix 20 mg given 11/20/2022  Monitor with continuous pulse ox while on Dilaudid PCA       VTE Pharmacologic Prophylaxis:   Pharmacologic: Argatroban  Mechanical VTE Prophylaxis in Place: No    Patient Centered Rounds: I have performed bedside rounds with nursing staff today  Discussions with Specialists or Other Care Team Provider:  Vascular surgery    Education and Discussions with Family / Patient:  Daughter    Time Spent for Care: 20 minutes  More than 50% of total time spent on counseling and coordination of care as described above      Current Length of Stay: 40 day(s)    Current Patient Status: Inpatient   Certification Statement: The patient will continue to require additional inpatient hospital stay due to AKA    Discharge Plan:  Anticipate short-term rehab placement    Code Status: Level 1 - Full Code      Subjective:   Seen and examined prior to her surgery  She admits feeling scared  Not in acute pain  No shortness of breath    Objective:     Vitals:   Temp (24hrs), Av 1 °F (36 7 °C), Min:97 4 °F (36 3 °C), Max:98 5 °F (36 9 °C)    Temp:  [97 4 °F (36 3 °C)-98 5 °F (36 9 °C)] 97 4 °F (36 3 °C)  HR:  [84-96] 84  Resp:  [16-20] 16  BP: (128-187)/(67-79) 171/76  SpO2:  [94 %-97 %] 94 %  Body mass index is 33 76 kg/m²  Input and Output Summary (last 24 hours): Intake/Output Summary (Last 24 hours) at 2022 1139  Last data filed at 2022 1055  Gross per 24 hour   Intake 1729 43 ml   Output 1550 ml   Net 179 43 ml       Physical Exam:     Physical Exam  Constitutional:       Appearance: She is not ill-appearing or diaphoretic  HENT:      Head: Normocephalic and atraumatic  Nose: No congestion or rhinorrhea  Eyes:      General: No scleral icterus  Cardiovascular:      Rate and Rhythm: Normal rate and regular rhythm  Pulmonary:      Effort: Pulmonary effort is normal  No respiratory distress  Breath sounds: No wheezing or rales  Abdominal:      Palpations: Abdomen is soft  Genitourinary:     Comments: Jacinto in place  Musculoskeletal:         General: Deformity present  Cervical back: Neck supple  Comments: Left AKA   Skin:     Comments: Right foot gangrene   Neurological:      Mental Status: She is alert and oriented to person, place, and time     Psychiatric:         Mood and Affect: Mood normal          Behavior: Behavior normal      Additional Data:     Labs:    Results from last 7 days   Lab Units 22  0519   WBC Thousand/uL 11 60*   HEMOGLOBIN g/dL 6 8*   HEMATOCRIT % 22 4*   PLATELETS Thousands/uL 326   NEUTROS PCT % 84*   LYMPHS PCT % 7*   MONOS PCT % 6   EOS PCT % 2     Results from last 7 days   Lab Units 22  0519 22  0545 22  0339   SODIUM mmol/L 140   < > 138   POTASSIUM mmol/L 3 4*   < > 3 1*   CHLORIDE mmol/L 104   < > 101   CO2 mmol/L 31   < > 29   BUN mg/dL 7   < > 8   CREATININE mg/dL 0 70   < > 0 75   ANION GAP mmol/L 5   < > 8   CALCIUM mg/dL 7 4*   < > 7 5*   ALBUMIN g/dL  --   --  1 1* TOTAL BILIRUBIN mg/dL  --   --  0 34   ALK PHOS U/L  --   --  424*   ALT U/L  --   --  27   AST U/L  --   --  45   GLUCOSE RANDOM mg/dL 103   < > 174*    < > = values in this interval not displayed  Results from last 7 days   Lab Units 12/01/22  0816 11/30/22 2127 11/30/22  1601 11/30/22  1104 11/30/22  0730 11/29/22 2124 11/29/22  1626 11/29/22  1139 11/29/22  0759 11/29/22  0051 11/28/22 2055 11/28/22  1618   POC GLUCOSE mg/dl 103 108 103 82 91 96 91 98 99 94 89 91         Results from last 7 days   Lab Units 11/28/22  0532 11/27/22  0545   PROCALCITONIN ng/ml 0 18 0 17           * I Have Reviewed All Lab Data Listed Above  * Additional Pertinent Lab Tests Reviewed:  All Labs Within Last 24 Hours Reviewed    Imaging:    Imaging Reports Reviewed Today Include: no new imaging      Recent Cultures (last 7 days):           Last 24 Hours Medication List:   Current Facility-Administered Medications   Medication Dose Route Frequency Provider Last Rate   • [MAR Hold] acetaminophen  650 mg Oral Q6H [address] Irma Castro MD     • [facility] Hold] ALPRAZolam  0 25 mg Oral Daily PRN Irma Castro MD     • [facility] Hold] amLODIPine  10 mg Oral Daily Irma Castro MD     • [facility] Hold] atorvastatin  40 mg Oral Daily With Carlo Day MD     • [facility] Hold] clopidogrel  75 mg Oral Daily Irma Castro MD     • [facility] Hold] doxazosin  2 mg Oral Daily Irma Castro MD     • [facility] Hold] gabapentin  200 mg Oral TID May DO Doron     • HYDROmorphone   Intravenous Continuous Heri Tom MD     • [facility] Hold] insulin glargine  10 Units Subcutaneous HS Ayaan Chan PA-C     • [facility] Hold] insulin lispro  1-6 Units Subcutaneous TID [facility] [place] Irma Castro MD     • [facility] Hold] metoclopramide  10 mg Intravenous Q6H PRN Irma Castro MD     • [facility] Hold] naloxone  0 04 mg Intravenous Q1MIN PRN Irma Castro MD     • [facility] Hold] nystatin   Topical BID Irma Castro MD     • [facility] Hold] ondansetron  4 mg Intravenous Q6H PRN Nando Oliveros MD     • [facility] Hold] pantoprazole  40 mg Oral Early Morning Nando Oliveros MD     • [facility] Hold] polyethylene glycol  17 g Oral BID Mindi Dietz DO     • [MAR Hold] potassium chloride  40 mEq Oral BID Donnelly Closs, MD     • [facility] Hold] QUEtiapine  25 mg Oral HS Nando Oliveros MD     • [facility] Hold] senna  1 tablet Oral BID Jonathan Ladd MD     • [facility] Hold] sertraline  150 mg Oral Daily Nando Oliveros MD     • sodium chloride  20 mL/hr Intravenous Continuous Nando Oliveros MD Stopped (11/30/22 7612)   • sodium chloride  125 mL/hr Intravenous Continuous Delphy Defalcis,  mL/hr (12/01/22 0512)        Today, Patient Was Seen By: Donnelly Closs, MD    ** Please Note: Dictation voice to text software may have been used in the creation of this document   **

## 2022-12-01 NOTE — ASSESSMENT & PLAN NOTE
· Noted to have change in mental status on 11/13/2022    · CT confirmed moderate right parietal lobe stroke, likely embolic in the setting of HIT  · Treated with argatroban infusion, currently on hold for surgery  · Discussed with vascular surgery regarding eventual resumption of argatroban

## 2022-12-01 NOTE — PHYSICAL THERAPY NOTE
PHYSICAL THERAPY NOTE          Patient Name: Naz Shahid  WPRVI'D Date: 11/30/2022 11/30/22 7458   Note Type   Note Type Treatment   Pain Assessment   Pain Assessment Tool 0-10   Pain Score 6   Pain Location/Orientation Orientation: Bilateral;Location: Leg   Restrictions/Precautions   RLE Weight Bearing Per Order   (aka)   LLE Weight Bearing Per Order NWB   Other Precautions Chair Alarm; Bed Alarm;Cognitive;Multiple lines; Fall Risk;Pain;O2  (AKA tentatively scheduled for 12/1)   General   Chart Reviewed Yes   Family/Caregiver Present No   Cognition   Overall Cognitive Status Impaired   Arousal/Participation Responsive   Attention Attends with cues to redirect   Orientation Level Oriented X4   Memory Decreased recall of precautions   Following Commands Follows one step commands with increased time or repetition   Subjective   Subjective I'm scared about the surgery tomorrow  Bed Mobility   Rolling R 1  Dependent   Additional items Assist x 1;LE management; Increased time required;Verbal cues   Rolling L 1  Dependent   Additional items Assist x 2; Increased time required;Verbal cues;LE management   Supine to Sit 1  Dependent   Additional items Assist x 2; Increased time required;Verbal cues;LE management;HOB elevated   Sit to Supine 1  Dependent   Additional items Assist x 2; Increased time required;Verbal cues;LE management   Additional Comments sitting eob with dependant assist x2 due to heavy retropulsion, R side lean and resistance  pt  unable to sit fully upright due to pain L residual limb, sitting tolerance eob x 5-7 minutes   Transfers   Additional Comments recommend andree for transfers out of bed  Endurance Deficit   Endurance Deficit Description fatigue, pain   Activity Tolerance   Activity Tolerance Patient limited by pain; Patient limited by fatigue   Exercises   Quad Sets Supine;10 reps;Right   Glute Sets Supine;10 reps;Bilateral   Balance training  sitting balance toleracbe eob 5-7 minutes workig on upright sitting posture, trunk flexion midline orientation  attempted Lle arom exercises however pt ujnable to tolerate due to increased pain  Assessment   Prognosis Guarded   Problem List Decreased strength;Decreased endurance; Impaired balance;Decreased mobility; Decreased cognition; Impaired judgement;Decreased safety awareness; Obesity; Decreased skin integrity;Pain   Assessment Pt  Seen for PT treatment session,  Pt  Is heavy assistance for rolling L and r, supine to sit and sit to supine and sitting balance tolerance activities at EOB  Pt  Is currently requiring dependant assistance for mobility due to decreased balance, strength, heavy retropulsion R sided lean with in ability to right self to midline and maintain, pt requires trunk support with supine,>sit and sitting EOB activities with ue support, pt unable flex trunk foward to sit upright due to increased L le pain  Pt resistive with attempts at flexing trunk forward to achieve full upright sitting posture/ balance  Sitting tolerane EOB 5-7 minutes  Pt  Performs R le quad sets and b/l glut sets with fair tolerance  Pt unable  to perform a-aarom to L e due to increased pain  Continue to recommend inpt rehab to address deficits in strength, balance, mobility, activity tolerance, and endurance in order to maximize functional outcomes and improved overall level of independence  Goals   Patient Goals To sit at my kitchen table to do a puzzle  STG Expiration Date 12/08/22   PT Treatment Day 8   Plan   Treatment/Interventions LE strengthening/ROM; Therapeutic exercise; Endurance training;Patient/family training;Equipment eval/education; Bed mobility;Spoke to nursing;OT;Compensatory technique education   Progress Slow progress, decreased activity tolerance  (pain,)   PT Frequency 3-5x/wk   Recommendation   PT Discharge Recommendation Post acute rehabilitation services AM-PAC Basic Mobility Inpatient   Turning in Bed Without Bedrails 1   Lying on Back to Sitting on Edge of Flat Bed 1   Moving Bed to Chair 1   Standing Up From Chair 1   Walk in Room 1   Climb 3-5 Stairs 1   Basic Mobility Inpatient Raw Score 6   Turning Head Towards Sound 4   Follow Simple Instructions 3   Low Function Basic Mobility Raw Score 13   Low Function Basic Mobility Standardized Score 20 14   Highest Level Of Mobility   -[facility] Goal 2: Bed activities/Dependent transfer   -[facility] Achieved 3: Sit at edge of bed   Education   Education Provided Mobility training;Home exercise program   Patient Reinforcement needed   End of Consult   Patient Position at End of Consult Supine;Bed/Chair alarm activated; All needs within reach      11/30/22 8789   Note Type   Note Type Treatment   Pain Assessment   Pain Assessment Tool 0-10   Pain Score 6   Pain Location/Orientation Orientation: Bilateral;Location: Leg   Restrictions/Precautions   RLE Weight Bearing Per Order   (aka)   LLE Weight Bearing Per Order NWB   Other Precautions Chair Alarm; Bed Alarm;Cognitive;Multiple lines; Fall Risk;Pain;O2  (AKA tentatively scheduled for 12/1)   General   Chart Reviewed Yes   Family/Caregiver Present No   Cognition   Overall Cognitive Status Impaired   Arousal/Participation Responsive   Attention Attends with cues to redirect   Orientation Level Oriented X4   Memory Decreased recall of precautions   Following Commands Follows one step commands with increased time or repetition   Subjective   Subjective I'm scared about the surgery tomorrow  Bed Mobility   Rolling R 1  Dependent   Additional items Assist x 1;LE management; Increased time required;Verbal cues   Rolling L 1  Dependent   Additional items Assist x 2; Increased time required;Verbal cues;LE management   Supine to Sit 1  Dependent   Additional items Assist x 2; Increased time required;Verbal cues;LE management;HOB elevated   Sit to Supine 1  Dependent   Additional items Assist x 2;Increased time required;Verbal cues;LE management   Additional Comments sitting eob with dependant assist x2 due to heavy retropulsion, R side lean and resistance  pt  unable to sit fully upright due to pain L residual limb, sitting tolerance eob x 5-7 minutes   Transfers   Additional Comments recommend andree for transfers out of bed  Endurance Deficit   Endurance Deficit Description fatigue, pain   Activity Tolerance   Activity Tolerance Patient limited by pain; Patient limited by fatigue   Exercises   Quad Sets Supine;10 reps;Right   Glute Sets Supine;10 reps;Bilateral   Balance training  sitting balance toleracbe eob 5-7 minutes workig on upright sitting posture, trunk flexion midline orientation  attempted Lle arom exercises however pt ujnable to tolerate due to increased pain  Assessment   Prognosis Guarded   Problem List Decreased strength;Decreased endurance; Impaired balance;Decreased mobility; Decreased cognition; Impaired judgement;Decreased safety awareness; Obesity; Decreased skin integrity;Pain   Assessment Pt  Seen for PT treatment session,  Pt  Is heavy assistance for rolling L and r, supine to sit and sit to supine and sitting balance tolerance activities at EOB  Pt  Is currently requiring dependant assistance for mobility due to decreased balance, strength, heavy retropulsion R sided lean with in ability to right self to midline and maintain, pt requires trunk support with supine,>sit and sitting EOB activities with ue support, pt unable flex trunk foward to sit upright due to increased L le pain  Pt resistive with attempts at flexing trunk forward to achieve full upright sitting posture/ balance  Sitting tolerane EOB 5-7 minutes  Pt  Performs R le quad sets and b/l glut sets with fair tolerance  Pt unable  to perform a-aarom to L e due to increased pain    Continue to recommend inpt rehab to address deficits in strength, balance, mobility, activity tolerance, and endurance in order to maximize functional outcomes and improved overall level of independence  Goals   Patient Goals To sit at my kitchen table to do a puzzle  STG Expiration Date 12/08/22   PT Treatment Day 8   Plan   Treatment/Interventions LE strengthening/ROM; Therapeutic exercise; Endurance training;Patient/family training;Equipment eval/education; Bed mobility;Spoke to nursing;OT;Compensatory technique education   Progress Slow progress, decreased activity tolerance  (pain,)   PT Frequency 3-5x/wk   Recommendation   PT Discharge Recommendation Post acute rehabilitation services   AM-PAC Basic Mobility Inpatient   Turning in Bed Without Bedrails 1   Lying on Back to Sitting on Edge of Flat Bed 1   Moving Bed to Chair 1   Standing Up From Chair 1   Walk in Room 1   Climb 3-5 Stairs 1   Basic Mobility Inpatient Raw Score 6   Turning Head Towards Sound 4   Follow Simple Instructions 3   Low Function Basic Mobility Raw Score 13   Low Function Basic Mobility Standardized Score 20 14   Highest Level Of Mobility   [facility] Goal 2: Bed activities/Dependent transfer   [facility] Achieved 3: Sit at edge of bed   Education   Education Provided Mobility training;Home exercise program   Patient Reinforcement needed   End of Consult   Patient Position at End of Consult Supine;Bed/Chair alarm activated; All needs within reach   Kranthi Rebolledo

## 2022-12-01 NOTE — PHYSICAL THERAPY NOTE
Physical Therapy Cancellation Note    Pt unavailable for PT treatment interventions at this time as pt in OR for AKA of R le  Will cancel PT today and await new PT orders to perform re-eval once medically cleared and stable      Sasha Maldonado

## 2022-12-01 NOTE — OP NOTE
OPERATIVE REPORT  PATIENT NAME: Luda Ulloa    :  1960  MRN: [id number]  Pt Location: [place] [address]    SURGERY DATE: 2022    Surgeon(s) and Role:     * Johnathan Britton DO - Primary     * Aliya Mendoza PA-C - Assisting    Preop Diagnosis:  PAD (peripheral artery disease) ([address] ) [I73 9]    Post-Op Diagnosis Codes:     * PAD (peripheral artery disease) ([facility] ) [I73 9]    Procedure(s) (LRB):  Right above knee amputation    Specimen(s):  ID Type Source Tests Collected by Time Destination   1 : Right above knee amputation  Tissue Leg, Right TISSUE EXAM Johnathan Britton DO 2022 1218        Estimated Blood Loss:   350 mL    Drains:  Urethral Catheter Non-latex 18 Fr  (Active)   Amt returned on insertion(mL) 700 mL 22 1600   Reasons to continue Urinary Catheter  Stage III/IV sacral ulcers or open perineal wounds in incontinent patients 22   Goal for Removal N/A- discharging with pa 22   Site Assessment Skin intact 22   Pa Care Done 22 0900   Collection Container Standard drainage bag 22   Securement Method Other (Comment) 22   Securing Device Change Date 22 0447   Output (mL) 500 mL 22 0512   Number of days: 15       Anesthesia Type:   Choice    Operative Indications:  [facility]) [facility] [I73 9]  Patient is a 52-year-old woman with nonsalvageable right lower extremity  Right above knee amputation recommended  Of note patient previously left above-the-knee amputation        Operative Findings:  Right thigh with full thickness necrotic irregular wounds- sharp excisional debridement carried out using scalpel and currette    Posterior thigh wound: 5cm x 3cm irregular stellate  Medial thigh wound: 3cm x 2cm  Lateral thigh wound: 7cm x 4cm x 0 5cm    Extensive SQ tissue edema  The wounds posteriorly and laterally extended to the skin edges of AKA flap    Complications:   None    Procedure and Technique:  The patient was properly identified in the preop holding area  Patient's name, laterality, and nature procedure verified  The operative site was marked  Patient was brought to the operating room where she was positioned supine on the OR table  Patient was placed under general anesthesia  The right lower extremity dressings were removed  At this time it was noted that she had full-thickness wounds scattered throughout her thigh at the planned [facility] skin incision site  The right lower extremity was prepped using Betadine and draped in usual sterile fashion  A preoperative dose of antibiotics was administered  A formal time-out was performed and all in agreement  We decided to perform a circumferential skin incision just above the level of the patella  The incision was deepened down through the subcutaneous tissue fascia and musculature using the electrocautery  The periosteum was elevated off the distal femur  Next the neurovascular bundle was identified clamped proximally and distally and transected  The proximal stump was controlled with a suture ligature  The femur was next transected using the power oscillating saw  The remainder of the posterior soft tissue attachments were divided using an amputation knife  The leg was passed off the field as routine specimen  The distal end of the femur was further freed from its surrounding tissue and additional 4 cm of bone transected using the power oscillating saw  Next, attention was turned towards the full-thickness necrotic wounds located medially, laterally and posteriorly  The wounds were sharply debrided using combination of scalpel and curette  The stump was next irrigated  Hemostasis was secured using electrocautery  The flap was closed in a multilayer fashion using interrupted of 0,2-0, 3-0 vicryl sutures  The skin was re-approximated using interrupted nylon sutures    A sterile dressing consisting of Xeroform, 4 x 4 gauze, ABD pads, Kerlix, Ace wrap, and IO band was placed  The patient did receive 2 units of packed RBCs  The patient was awakened, extubated transferred to recovery room in stable condition     I was present for the entire procedure, A qualified resident physician was not available and A physician assistant was required during the procedure for retraction tissue handling,dissection and suturing    Patient Disposition:  PACU         SIGNATURE: Johnathan Britton DO  DATE: December 1, 2022  TIME: 1:43 PM

## 2022-12-01 NOTE — PROGRESS NOTES
Vascular Surgery  Progress Note   Ashwin Poole 65 y o  female MRN: [id number]  Unit/Bed#: E4 -01 Encounter: [id number]    Assessment:  65F with DM, HTN, HLD, h/o CVA presenting with bilateral CLTI (nonhealing L heel ulcer, R hallux and 5th digit ulceration underwent multiple endovascular interventions listed below followed by L AKA for non-salvageable foot  Hospital course complicated by atheroembolism with significant tissue loss and R hemispheric stroke and JUSTIN     10/31 R ALLISON BES, L SFA shockwave/FATOU   R CFA DCB/Atherectomy, SFA/pop FATOU, pop DCB  11/10 R ALLISON thrombectomy/BES, L SFA stent thrombectomy/SES, pop POBA   L AKA     Afebrile overnight  WBC 11 6 from  14  Hb 6 8 from 7 6      Plan:  Plan for OR today for R AKA, possible BKA   Give 1U PRBC for Hb 6 8  Hold argatroban gtt this morning for surgery  Continue pain control  NPO/IVF   Continue Plavix and Lipitor  Will take down Prevena vac in [address] [place] [address] per primary team  Vascular surgery following    Subjective/Objective     Subjective:   No acute events overnight  No complaints or questions this morning  Objective:     Vitals:Blood pressure 128/76, pulse 96, temperature 98 1 °F (36 7 °C), temperature source Temporal, resp  rate 20, height 5' 4" (1 626 m), weight 89 2 kg (196 lb 10 4 oz), SpO2 96 %, not currently breastfeeding  ,Body mass index is 33 76 kg/m²  Temp (24hrs), Av °F (36 7 °C), Min:97 °F (36 1 °C), Max:98 5 °F (36 9 °C)  Current: Temperature: 98 1 °F (36 7 °C)      Intake/Output Summary (Last 24 hours) at 2022 9521  Last data filed at 2022 6898  Gross per 24 hour   Intake 1229 98 ml   Output 1550 ml   Net -320 02 ml       Invasive Devices     Peripherally Inserted Central Catheter Line  Duration           PICC Line  Right Basilic 12 days          Drain  Duration           Urethral Catheter Non-latex 18 Fr   14 days              Physical Exam:    General: No acute distress  CV: Normal rate  Respiratory: Non-labored breathing  Abdominal: Soft  Extremities: Warm  Neurologic: Alert    Wound/Incision:  L AKA stump site with Prevena vac in place c/d/i  R foot with dry gangrene with dry escar and skin slouphing, leg and thigh wrapped with kerlix not taken down, there is extensive tissue loss of lower leg with schar as well as medial thigh  Unfortunately, this appears to be full thickness ischemic injury of the posterolateral leg  The thigh is partial thickness      Pulse exam:  Nonpalpable R pedal pulses

## 2022-12-01 NOTE — PLAN OF CARE
Problem: PHYSICAL THERAPY ADULT  Goal: Performs mobility at highest level of function for planned discharge setting  See evaluation for individualized goals  Description: Treatment/Interventions: Functional transfer training, LE strengthening/ROM, Therapeutic exercise, Endurance training, Patient/family training, Equipment eval/education, Bed mobility, Compensatory technique education, Gait training, Continued evaluation, Spoke to nursing, Spoke to MD, Spoke to case management, OT          See flowsheet documentation for full assessment, interventions and recommendations  Outcome: Progressing  Note: Prognosis: Guarded  Problem List: Decreased strength, Decreased endurance, Impaired balance, Decreased mobility, Decreased cognition, Impaired judgement, Decreased safety awareness, Obesity, Decreased skin integrity, Pain  Assessment: Pt  Seen for PT treatment session,  Pt  Is heavy assistance for rolling L and r, supine to sit and sit to supine and sitting balance tolerance activities at EOB  Pt  Is currently requiring dependant assistance for mobility due to decreased balance, strength, heavy retropulsion R sided lean with in ability to right self to midline and maintain, pt requires trunk support with supine,>sit and sitting EOB activities with ue support, pt unable flex trunk foward to sit upright due to increased L le pain  Pt resistive with attempts at flexing trunk forward to achieve full upright sitting posture/ balance  Sitting tolerane EOB 5-7 minutes  Pt  Performs R le quad sets and b/l glut sets with fair tolerance  Pt unable  to perform a-aarom to L e due to increased pain  Continue to recommend inpt rehab to address deficits in strength, balance, mobility, activity tolerance, and endurance in order to maximize functional outcomes and improved overall level of independence    Barriers to Discharge: Inaccessible home environment  Barriers to Discharge Comments: alone  PT Discharge Recommendation: Post acute rehabilitation services    See flowsheet documentation for full assessment

## 2022-12-01 NOTE — ASSESSMENT & PLAN NOTE
· Initially admitted for nonhealing wound of the left lower extremity, requiring multiple procedures ultimately required left AKA on 11/23/2022  · For right AKA today per vascular surgery  · Will need postop pain control, wound care, close monitoring of respiratory status and blood counts  · She has a Jacinto catheter mainly to prevent contamination of her stump wound    · Will have to determine if this can be removed without endangering the AKA stumps for if it has to stay

## 2022-12-01 NOTE — ASSESSMENT & PLAN NOTE
Monitor and transfuse as needed    Recent Labs     11/29/22  0913 12/01/22  0519   HGB 7 6* 6 8*     Transfuse for hemoglobin 6 8  Patient currently in OR  Discussed with vascular surgery  Blood transfusion was ordered earlier this morning but was not given  The nurse asked for a new consent since her blood consent was a "month old"  However she also had a consent for surgery which included a new blood consent  Either way I did discuss blood transfusion with the patient and her daughter  New consent was signed by daughter

## 2022-12-02 LAB
ABO GROUP BLD BPU: NORMAL
ABO GROUP BLD BPU: NORMAL
ALBUMIN SERPL BCP-MCNC: 1 G/DL (ref 3.5–5)
ALP SERPL-CCNC: 318 U/L (ref 46–116)
ALT SERPL W P-5'-P-CCNC: 12 U/L (ref 12–78)
ANION GAP SERPL CALCULATED.3IONS-SCNC: 10 MMOL/L (ref 4–13)
APTT PPP: 112 SECONDS (ref 23–37)
APTT PPP: 140 SECONDS (ref 23–37)
APTT PPP: 70 SECONDS (ref 23–37)
AST SERPL W P-5'-P-CCNC: 25 U/L (ref 5–45)
BASOPHILS # BLD AUTO: 0.08 THOUSANDS/ÂΜL (ref 0–0.1)
BASOPHILS NFR BLD AUTO: 1 % (ref 0–1)
BILIRUB DIRECT SERPL-MCNC: 0.21 MG/DL (ref 0–0.2)
BILIRUB SERPL-MCNC: 0.43 MG/DL (ref 0.2–1)
BPU ID: NORMAL
BPU ID: NORMAL
BUN SERPL-MCNC: 9 MG/DL (ref 5–25)
CALCIUM SERPL-MCNC: 7.9 MG/DL (ref 8.3–10.1)
CHLORIDE SERPL-SCNC: 106 MMOL/L (ref 96–108)
CO2 SERPL-SCNC: 23 MMOL/L (ref 21–32)
CREAT SERPL-MCNC: 0.65 MG/DL (ref 0.6–1.3)
CROSSMATCH: NORMAL
CROSSMATCH: NORMAL
EOSINOPHIL # BLD AUTO: 0.01 THOUSAND/ÂΜL (ref 0–0.61)
EOSINOPHIL NFR BLD AUTO: 0 % (ref 0–6)
ERYTHROCYTE [DISTWIDTH] IN BLOOD BY AUTOMATED COUNT: 17.2 % (ref 11.6–15.1)
GFR SERPL CREATININE-BSD FRML MDRD: 96 ML/MIN/1.73SQ M
GLUCOSE SERPL-MCNC: 102 MG/DL (ref 65–140)
GLUCOSE SERPL-MCNC: 105 MG/DL (ref 65–140)
GLUCOSE SERPL-MCNC: 121 MG/DL (ref 65–140)
GLUCOSE SERPL-MCNC: 151 MG/DL (ref 65–140)
GLUCOSE SERPL-MCNC: 166 MG/DL (ref 65–140)
HCT VFR BLD AUTO: 27.4 % (ref 34.8–46.1)
HCT VFR BLD AUTO: 27.5 % (ref 34.8–46.1)
HGB BLD-MCNC: 8.4 G/DL (ref 11.5–15.4)
HGB BLD-MCNC: 8.7 G/DL (ref 11.5–15.4)
IMM GRANULOCYTES # BLD AUTO: 0.19 THOUSAND/UL (ref 0–0.2)
IMM GRANULOCYTES NFR BLD AUTO: 1 % (ref 0–2)
INR PPP: 2.61 (ref 0.84–1.19)
LYMPHOCYTES # BLD AUTO: 0.69 THOUSANDS/ÂΜL (ref 0.6–4.47)
LYMPHOCYTES NFR BLD AUTO: 4 % (ref 14–44)
MCH RBC QN AUTO: 26.2 PG (ref 26.8–34.3)
MCHC RBC AUTO-ENTMCNC: 30.5 G/DL (ref 31.4–37.4)
MCV RBC AUTO: 86 FL (ref 82–98)
MONOCYTES # BLD AUTO: 1.2 THOUSAND/ÂΜL (ref 0.17–1.22)
MONOCYTES NFR BLD AUTO: 7 % (ref 4–12)
NEUTROPHILS # BLD AUTO: 14.66 THOUSANDS/ÂΜL (ref 1.85–7.62)
NEUTS SEG NFR BLD AUTO: 87 % (ref 43–75)
NRBC BLD AUTO-RTO: 0 /100 WBCS
PLATELET # BLD AUTO: 372 THOUSANDS/UL (ref 149–390)
PMV BLD AUTO: 8.9 FL (ref 8.9–12.7)
POTASSIUM SERPL-SCNC: 4.4 MMOL/L (ref 3.5–5.3)
PROT SERPL-MCNC: 5.5 G/DL (ref 6.4–8.4)
PROTHROMBIN TIME: 27.7 SECONDS (ref 11.6–14.5)
RBC # BLD AUTO: 3.21 MILLION/UL (ref 3.81–5.12)
SODIUM SERPL-SCNC: 139 MMOL/L (ref 135–147)
UNIT DISPENSE STATUS: NORMAL
UNIT DISPENSE STATUS: NORMAL
UNIT PRODUCT CODE: NORMAL
UNIT PRODUCT CODE: NORMAL
UNIT PRODUCT VOLUME: 350 ML
UNIT PRODUCT VOLUME: 350 ML
UNIT RH: NORMAL
UNIT RH: NORMAL
WBC # BLD AUTO: 16.83 THOUSAND/UL (ref 4.31–10.16)

## 2022-12-02 PROCEDURE — 82948 REAGENT STRIP/BLOOD GLUCOSE: CPT

## 2022-12-02 PROCEDURE — NC001 PR NO CHARGE: Performed by: SURGERY

## 2022-12-02 PROCEDURE — 85730 THROMBOPLASTIN TIME PARTIAL: CPT | Performed by: SURGERY

## 2022-12-02 PROCEDURE — 85025 COMPLETE CBC W/AUTO DIFF WBC: CPT | Performed by: PHYSICIAN ASSISTANT

## 2022-12-02 PROCEDURE — 80076 HEPATIC FUNCTION PANEL: CPT | Performed by: SURGERY

## 2022-12-02 PROCEDURE — 97110 THERAPEUTIC EXERCISES: CPT

## 2022-12-02 PROCEDURE — 85610 PROTHROMBIN TIME: CPT | Performed by: SURGERY

## 2022-12-02 PROCEDURE — 85018 HEMOGLOBIN: CPT | Performed by: PHYSICIAN ASSISTANT

## 2022-12-02 PROCEDURE — 85014 HEMATOCRIT: CPT | Performed by: PHYSICIAN ASSISTANT

## 2022-12-02 PROCEDURE — 99232 SBSQ HOSP IP/OBS MODERATE 35: CPT | Performed by: INTERNAL MEDICINE

## 2022-12-02 PROCEDURE — 80048 BASIC METABOLIC PNL TOTAL CA: CPT | Performed by: PHYSICIAN ASSISTANT

## 2022-12-02 PROCEDURE — 99232 SBSQ HOSP IP/OBS MODERATE 35: CPT | Performed by: NURSE PRACTITIONER

## 2022-12-02 RX ORDER — GABAPENTIN 400 MG/1
400 CAPSULE ORAL 3 TIMES DAILY
Status: DISCONTINUED | OUTPATIENT
Start: 2022-12-02 | End: 2023-01-12 | Stop reason: HOSPADM

## 2022-12-02 RX ORDER — METHOCARBAMOL 500 MG/1
1000 TABLET, FILM COATED ORAL EVERY 8 HOURS SCHEDULED
Status: DISCONTINUED | OUTPATIENT
Start: 2022-12-02 | End: 2023-01-12 | Stop reason: HOSPADM

## 2022-12-02 RX ORDER — ACETAMINOPHEN 325 MG/1
975 TABLET ORAL EVERY 6 HOURS SCHEDULED
Status: DISCONTINUED | OUTPATIENT
Start: 2022-12-02 | End: 2022-12-11

## 2022-12-02 RX ORDER — ARGATROBAN 1 MG/ML
.1-3 INJECTION INTRAVENOUS
Status: DISCONTINUED | OUTPATIENT
Start: 2022-12-02 | End: 2022-12-04

## 2022-12-02 RX ADMIN — ACETAMINOPHEN 650 MG: 325 TABLET, FILM COATED ORAL at 05:43

## 2022-12-02 RX ADMIN — SENNOSIDES 8.6 MG: 8.6 TABLET, FILM COATED ORAL at 17:50

## 2022-12-02 RX ADMIN — NYSTATIN: 100000 POWDER TOPICAL at 17:59

## 2022-12-02 RX ADMIN — ACETAMINOPHEN 975 MG: 325 TABLET, FILM COATED ORAL at 12:59

## 2022-12-02 RX ADMIN — METHOCARBAMOL 1000 MG: 500 TABLET ORAL at 09:49

## 2022-12-02 RX ADMIN — GABAPENTIN 400 MG: 400 CAPSULE ORAL at 09:49

## 2022-12-02 RX ADMIN — GABAPENTIN 400 MG: 400 CAPSULE ORAL at 16:49

## 2022-12-02 RX ADMIN — SENNOSIDES 8.6 MG: 8.6 TABLET, FILM COATED ORAL at 09:49

## 2022-12-02 RX ADMIN — CLOPIDOGREL BISULFATE 75 MG: 75 TABLET ORAL at 09:49

## 2022-12-02 RX ADMIN — ARGATROBAN 0.2 MCG/KG/MIN: 50 INJECTION, SOLUTION INTRAVENOUS at 23:21

## 2022-12-02 RX ADMIN — PANTOPRAZOLE SODIUM 40 MG: 40 TABLET, DELAYED RELEASE ORAL at 05:44

## 2022-12-02 RX ADMIN — ACETAMINOPHEN 975 MG: 325 TABLET, FILM COATED ORAL at 17:50

## 2022-12-02 RX ADMIN — ACETAMINOPHEN 650 MG: 325 TABLET, FILM COATED ORAL at 00:30

## 2022-12-02 RX ADMIN — SERTRALINE HYDROCHLORIDE 150 MG: 100 TABLET ORAL at 09:49

## 2022-12-02 RX ADMIN — SODIUM CHLORIDE 20 ML/HR: 0.9 INJECTION, SOLUTION INTRAVENOUS at 23:21

## 2022-12-02 RX ADMIN — DOXAZOSIN 2 MG: 2 TABLET ORAL at 09:49

## 2022-12-02 RX ADMIN — METHOCARBAMOL 1000 MG: 500 TABLET ORAL at 16:49

## 2022-12-02 RX ADMIN — GABAPENTIN 400 MG: 400 CAPSULE ORAL at 20:40

## 2022-12-02 RX ADMIN — AMLODIPINE BESYLATE 10 MG: 10 TABLET ORAL at 09:49

## 2022-12-02 RX ADMIN — ATORVASTATIN CALCIUM 40 MG: 40 TABLET, FILM COATED ORAL at 16:49

## 2022-12-02 RX ADMIN — QUETIAPINE FUMARATE 25 MG: 25 TABLET ORAL at 20:39

## 2022-12-02 RX ADMIN — ARGATROBAN 2 MCG/KG/MIN: 50 INJECTION, SOLUTION INTRAVENOUS at 10:35

## 2022-12-02 RX ADMIN — POLYETHYLENE GLYCOL 3350 17 G: 17 POWDER, FOR SOLUTION ORAL at 09:49

## 2022-12-02 RX ADMIN — INSULIN GLARGINE 10 UNITS: 100 INJECTION, SOLUTION SUBCUTANEOUS at 20:40

## 2022-12-02 RX ADMIN — METHOCARBAMOL 1000 MG: 500 TABLET ORAL at 20:40

## 2022-12-02 RX ADMIN — NYSTATIN: 100000 POWDER TOPICAL at 09:49

## 2022-12-02 RX ADMIN — POTASSIUM CHLORIDE 40 MEQ: 1500 TABLET, EXTENDED RELEASE ORAL at 09:49

## 2022-12-02 RX ADMIN — INSULIN LISPRO 1 UNITS: 100 INJECTION, SOLUTION INTRAVENOUS; SUBCUTANEOUS at 16:50

## 2022-12-02 RX ADMIN — POTASSIUM CHLORIDE 40 MEQ: 1500 TABLET, EXTENDED RELEASE ORAL at 17:51

## 2022-12-02 NOTE — PROGRESS NOTES
Medical Oncology/Hematology Progress Note  Al Baltazar, female, 65 y o , 1960,  [address] [facility] 4 /E4 -*, [phone]     Reason for initial consultation: Anemia  LOS: 42 days as of 12/2/22    Assessment and Plan:  1  Acute on chronic anemia  -Anemia of chronic disease exacerbated by hospitalization and surgical interventions as outlined under  The heading of peripheral vascular disease  -Evident since admission on 10/21/22, 10 1 g/dL  No lab results in between 2/2022 and day of admission  Hemoglobin trend: 8 7 (12/12) <--7 6 (11/30) <--7 5 (11/28) <--9 0 (11/27) <--7 6 (11/26) <--4 8 (11/24) L AKA procedure <--6 4 (11/15) <-7 0 (11/14) MCV 84, MCHC 31 9  -Received 1 unit of PRBCs yesterday  -s/p right CFA balloon angioplasty angiogram/atherectomy, CFA stent placement on 11/7/22, experienced some perioperative blood loss  -Period of acute kidney, was followed by Nephrology  NYDIA has now resolved  -Denies acute bleeding; hematuria, epistaxis, hematemesis, hematochezia  Drop in hemoglobin coincide with surgical interventions for this inpatient stay    2  Peripheral vascular disease   -Presence of non-healing left heel wound lower, s/p right CFA balloon angioplasty  -Angiogram/atherectomy, CFA stent placement on 11/7/22; experienced some perioperative blood loss  -Plan for OR surgery: R AKA on 11/30/22  She is s/p L AKA on 11/23/22   -Followed by Vascular    3  Stroke [facility])  -Multiple vascular procedures/interventions in  LE, complicated by atheroembolic events    -On 38/21, CT head was obtained on 11/13 precipitated by mental status change  Results showed moderate size acute/recent infarct in the right parietal lobe with local mass effect     -Etiology of infarct unclear; need to r/o if cardioembolic source/septic emboli  -Patient has no focal deficits  LABS (11/30/22):  Platelets 763H WNL  WBC 16 83, elevated, s/p R AKA  Suspicion of HIT at some point, thrombocytopenia resolved   Heparin changed to argatroban  PLAN:  1) Confirmed heparin-induced platelet antibody on 11/11/2022  Recommend transitioning from argatroban gtt to Coumadin; will need to be on Coumadin for at least a month to treat HIT  Patient can to be transitioned to Arixtra a month after being diagnosed with HIT  Vascular following  2) Monitor daily CBCs    3) Continue transfusion support for hemoglobin <7 g/dL, or if bleeding  Hemoglobin stable at 8 7 g/dl  4) Outpatient follow up plan: no need for follow-up in the outpatient hematology office    Communication with team:  Communicated with primary team  Reviewed this patient with Dr Edwin Abdi  Please do not hesitate to reach out for any questions or concerns  Myrna Lester, [address], BENNP  Hematology-Oncology    History of present illness:  Victoria Monzon 65 y o  female with history of insulin dependent DM , HTN, HLD, obesity, depression, initially presenting on 10/22 for non-healing LE wounds and subsequent prolonged hospitalization  She has a long-standing hx of PAD s/p multiple BLE endovascular interventions c/b atheroembolic events to BLE, now with subacute right-sided CVA on CT head  Results showed moderate size acute/recent infarct in the right parietal lobe with local mass effect  This was obtained on 11/13 precipitated by mental status change  Etiology of infarct unclear; need to r/o if cardioembolic source/septic emboli  Patient has no focal deficits on examination  During admission, she was placed on heparin drip and developed thrombocytopenia; concern for HIT and was switched to argatroban  She also met sepsis criteria  She developed NYDIA, now resolved  Hematology was consulted to offer recommendations regarding her anemia  Patient's hemoglobin stable today at 8 7, status post 1 unit PRBCs yesterday  Did well with right AKA  Transfusion recommendations in place    Recommendation to transition from argatroban drip to Coumadin in relation with her 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) that was first diagnosed on 11/11/2022  Patient understands that she needs to be on Coumadin a month on diagnosis of HIT, and can be transitioned to Arixtra  Discussed with primary team     Review of Systems:   Review of Systems   Constitutional: Positive for activity change and fatigue  Negative for chills and fever  HENT: Negative for ear pain and sore throat  Eyes: Negative for pain and visual disturbance  Respiratory: Negative for cough and shortness of breath  Cardiovascular: Negative for chest pain and palpitations  Gastrointestinal: Negative for abdominal pain and vomiting  Genitourinary: Negative for dysuria and hematuria  Musculoskeletal: Negative for arthralgias and back pain  BL LE pain   Skin: Positive for pallor  Negative for color change and rash  Neurological: Positive for weakness  Negative for seizures and syncope  Psychiatric/Behavioral: The patient is nervous/anxious  All other systems reviewed and are negative  Oncology History:   Cancer Staging  No matching staging information was found for the patient  Oncology History    No history exists  Past Medical History:   Past Medical History:   Diagnosis Date   • Anxiety    • Depression    • Diabetes ([facility] [address] )    • [facility] mellitus [facility] )    • Esophageal reflux     28 APR 2015 RESOLVED   • Hyperlipidemia    • Hypertension    • Low back pain     sciatica   • Pneumonia 2019   • Stroke ([facility] [address] ) 12/2015    small vessel, L frontal corona radiata   Rx asa 81, Lipitor 40, risk modification   • Vitamin D deficiency        Past Surgical History:   Procedure Laterality Date   • AMPUTATION ABOVE KNEE (AKA) Right 12/1/2022    Procedure: (AKA), PSB  BKA;  Surgeon: DO Cece;  Location: [facility];  Service: Vascular   • ARTERIOGRAM Right 11/7/2022    Procedure: -Right lower extremity angiogram -Right CFA balloon angioplasty with [id number]  [employer]  -Right CFA DCB with 6x40 Bard Lutonix -Right CFA atherectomy with Jetstream and distal embolization protection with 7mm Spider FX -Right SFA/Pop angioplasty with 4x40 Chololate balloon -Right SFA/Pop DCB with 5x150 Bard Lutonix -Right SFA stent with 6x80 W W  Spiration x2 -R   • COLONOSCOPY     • IR AORTAGRAM WITH RUN-OFF  10/31/2022   • IR LOWER EXTREMITY ANGIOGRAM  11/10/2022   • IR LOWER EXTREMITY ANGIOGRAM  2022   • MS AMPUTATE THIGH,THRU FEMUR Left 2022    Procedure: (AKA);   Surgeon: Leonardo Harris DO;  Location: [facility];  Service: Vascular       Family History   Problem Relation Age of Onset   • Diabetes Mother    • Lung cancer Mother    • Cancer Father    • Lung cancer Father    • Hypertension Brother    • Hypertension Family        Social History     Socioeconomic History   • Marital status: Single     Spouse name: None   • Number of children: None   • Years of education: None   • Highest education level: None   Occupational History   • None   Tobacco Use   • Smoking status: Former     Types: Cigarettes     Quit date:      Years since quittin 9   • Smokeless tobacco: Never   Vaping Use   • Vaping Use: Never used   Substance and Sexual Activity   • Alcohol use: Never     Comment: OCCASIONAL ALCOHOL USE IN ALL SCRIPTS   • Drug use: No   • Sexual activity: None   Other Topics Concern   • None   Social History Narrative   • None     Social Determinants of Health     Financial Resource Strain: Not on file   Food Insecurity: Not on file   Transportation Needs: Not on file   Physical Activity: Not on file   Stress: Not on file   Social Connections: Not on file   Intimate Partner Violence: Not on file   Housing Stability: Low Risk    • Unable to Pay for Housing in the Last Year: No   • Number of Places Lived in the Last Year: 1   • Unstable Housing in the Last Year: No         Current Facility-Administered Medications:   •  acetaminophen (TYLENOL) tablet 975 mg, 975 mg, Oral, Q6H [facility], Ellen Love MD, 975 mg at 22 1259  •  ALPRAZolam Dalbert Carton) tablet 0 25 mg, 0 25 mg, Oral, Daily PRN, Bryan Paige PA-C, 0 25 mg at 11/30/22 2131  •  amLODIPine (NORVASC) tablet 10 mg, 10 mg, Oral, Daily, Bryan Paige PA-C, 10 mg at 12/02/22 8280  •  argatroban infusion (premix), 0 1-3 mcg/kg/min, Intravenous, Titrated, Dalila Thomas MD, Last Rate: 10 7 mL/hr at 12/02/22 1035, 2 mcg/kg/min at 12/02/22 1035  •  atorvastatin (LIPITOR) tablet 40 mg, 40 mg, Oral, Daily With Alex Austin PA-C, 40 mg at 12/02/22 1649  •  clopidogrel (PLAVIX) tablet 75 mg, 75 mg, Oral, Daily, Bryan Paige PA-C, 75 mg at 12/02/22 6993  •  doxazosin (CARDURA) tablet 2 mg, 2 mg, Oral, Daily, Bryan Paige PA-C, 2 mg at 12/02/22 8738  •  gabapentin (NEURONTIN) capsule 400 mg, 400 mg, Oral, TID, Dalila Thomas MD, 400 mg at 12/02/22 1649  •  HYDROmorphone (DILAUDID) 1 mg/mL 50 mL PCA, , Intravenous, Continuous, Bryan Paige PA-C, Rate Verify at 12/02/22 5265  •  insulin glargine (LANTUS) subcutaneous injection 10 Units 0 1 mL, 10 Units, Subcutaneous, HS, Bryan Paige PA-C, 10 Units at 12/01/22 2121  •  insulin lispro (HumaLOG) 100 units/mL subcutaneous injection 1-6 Units, 1-6 Units, Subcutaneous, TID AC, 1 Units at 11/26/22 1635 **AND** Fingerstick Glucose (POCT), , , TID AC, Jailyn Meng PA-C  •  methocarbamol (ROBAXIN) tablet 1,000 mg, 1,000 mg, Oral, Q8H [address], Dalila Thomas MD, 1,000 mg at 12/02/22 1649  •  metoclopramide (REGLAN) injection 10 mg, 10 mg, Intravenous, Q6H PRN, Bryan Paige PA-C  •  naloxone (NARCAN) 0 04 mg/mL syringe 0 04 mg, 0 04 mg, Intravenous, Q1MIN PRN, Bryan Paige PA-C  •  nystatin (MYCOSTATIN) powder, , Topical, BID, Bryan Paige PA-C, Given at 12/02/22 3922  •  ondansetron [facility]) injection 4 mg, 4 mg, Intravenous, Q6H PRN, Bryan Paige PA-C, 4 mg at 11/24/22 6260  •  pantoprazole (PROTONIX) EC tablet 40 mg, 40 mg, Oral, Early Morning, Archana Humphrey Winter Diego PA-C, 40 mg at 12/02/22 0544  •  polyethylene glycol (MIRALAX) packet 17 g, 17 g, Oral, BID, Mckay Frazier PA-C, 17 g at 12/02/22 3164  •  potassium chloride (K-DUR,KLOR-CON) CR tablet 40 mEq, 40 mEq, Oral, BID, Mckay Frazier PA-C, 40 mEq at 12/02/22 8938  •  QUEtiapine (SEROquel) tablet 25 mg, 25 mg, Oral, HS, Mckay Frazier PA-C, 25 mg at 12/01/22 2121  •  senna (SENOKOT) tablet 8 6 mg, 1 tablet, Oral, BID, Mckay Frazier PA-C, 8 6 mg at 12/02/22 2741  •  sertraline (ZOLOFT) tablet 150 mg, 150 mg, Oral, Daily, Mckay Frazier PA-C, 150 mg at 12/02/22 6930  •  sodium chloride 0 9 % infusion, 20 mL/hr, Intravenous, Continuous, Mckay Frazier PA-C, Stopped at 11/30/22 2354    Medications Prior to Admission   Medication   • ALPRAZolam (XANAX) 0 25 mg tablet   • amLODIPine (NORVASC) 10 mg tablet   • aspirin 81 mg chewable tablet   • atorvastatin (LIPITOR) 40 mg tablet   • clotrimazole-betamethasone (LOTRISONE) 1-0 05 % cream   • fluocinonide (LIDEX) 0 05 % ointment   • gabapentin (NEURONTIN) 100 mg capsule   • insulin aspart (NovoLOG FlexPen) 100 UNIT/ML injection pen   • insulin glargine (Lantus SoloStar) 100 units/mL injection pen   • lisinopril (ZESTRIL) 40 mg tablet   • metoprolol succinate (TOPROL-XL) 25 mg 24 hr tablet   • Multiple Vitamin (multivitamin) tablet   • sertraline (ZOLOFT) 100 mg tablet   • Trulicity 1 77 DX/4 3PQ SOPN   • Cholecalciferol (Vitamin D-3) 25 MCG (1000 UT) CAPS   • Continuous Blood Gluc  (FreeStyle Noé 14 Day Dolomite) LITO   • Continuous Blood Gluc Sensor (FreeStyle Noé 14 Day Sensor) MISC   • Doxylamine Succinate, Sleep, (UNISOM PO)   • glucose blood (True Metrix Blood Glucose Test) test strip   • Insulin Pen Needle (B-D UF III MINI PEN NEEDLES) 31G X 5 MM MISC   • Lancets 28G MISC   • pantoprazole (PROTONIX) 40 mg tablet   • potassium chloride (K-DUR,KLOR-CON) 10 mEq tablet       Allergies   Allergen Reactions   • Heparin Other (See Comments)     History of HIT         Physical Exam:     /86 (BP Location: Left arm)   Pulse 94   Temp 97 8 °F (36 6 °C) (Temporal)   Resp 18   Ht 5' 4" (1 626 m)   Wt 89 2 kg (196 lb 10 4 oz)   SpO2 94%   BMI 33 76 kg/m²     Physical Exam  Constitutional:       Appearance: She is obese  HENT:      Head: Normocephalic  Mouth/Throat:      Mouth: Mucous membranes are dry  Eyes:      Conjunctiva/sclera: Conjunctivae normal    Cardiovascular:      Rate and Rhythm: Normal rate and regular rhythm  Pulmonary:      Effort: Pulmonary effort is normal       Comments: room air  Abdominal:      General: Bowel sounds are normal       Palpations: Abdomen is soft  Musculoskeletal:      Right lower leg: Edema present  Left lower leg: Edema present  Skin:     General: Skin is warm and dry  Findings: Bruising present  Neurological:      General: No focal deficit present  Mental Status: She is alert and oriented to person, place, and time  Motor: Weakness present     Psychiatric:         Mood and Affect: Mood normal            Recent Results (from the past 48 hour(s))   APTT    Collection Time: 11/30/22  5:35 PM   Result Value Ref Range    PTT 71 (H) 23 - 37 seconds   Basic metabolic panel    Collection Time: 11/30/22  7:27 PM   Result Value Ref Range    Sodium 138 135 - 147 mmol/L    Potassium 3 1 (L) 3 5 - 5 3 mmol/L    Chloride 101 96 - 108 mmol/L    CO2 31 21 - 32 mmol/L    ANION GAP 6 4 - 13 mmol/L    BUN 8 5 - 25 mg/dL    Creatinine 0 73 0 60 - 1 30 mg/dL    Glucose 113 65 - 140 mg/dL    Calcium 7 6 (L) 8 3 - 10 1 mg/dL    eGFR 89 ml/min/1 73sq m   Fingerstick Glucose (POCT)    Collection Time: 11/30/22  9:27 PM   Result Value Ref Range    POC Glucose 108 65 - 140 mg/dl   Basic metabolic panel    Collection Time: 12/01/22  5:19 AM   Result Value Ref Range    Sodium 140 135 - 147 mmol/L    Potassium 3 4 (L) 3 5 - 5 3 mmol/L    Chloride 104 96 - 108 mmol/L    CO2 31 21 - 32 mmol/L    ANION GAP 5 4 - 13 mmol/L    BUN 7 5 - 25 mg/dL    Creatinine 0 70 0 60 - 1 30 mg/dL    Glucose 103 65 - 140 mg/dL    Calcium 7 4 (L) 8 3 - 10 1 mg/dL    eGFR 93 ml/min/1 73sq m   CBC and differential    Collection Time: 12/01/22  5:19 AM   Result Value Ref Range    WBC 11 60 (H) 4 31 - 10 16 Thousand/uL    RBC 2 58 (L) 3 81 - 5 12 Million/uL    Hemoglobin 6 8 (LL) 11 5 - 15 4 g/dL    Hematocrit 22 4 (L) 34 8 - 46 1 %    MCV 87 82 - 98 fL    MCH 26 4 (L) 26 8 - 34 3 pg    MCHC 30 4 (L) 31 4 - 37 4 g/dL    RDW 15 2 (H) 11 6 - 15 1 %    MPV 8 4 (L) 8 9 - 12 7 fL    Platelets 301 776 - 094 Thousands/uL    nRBC 0 /100 WBCs    Neutrophils Relative 84 (H) 43 - 75 %    Immat GRANS % 1 0 - 2 %    Lymphocytes Relative 7 (L) 14 - 44 %    Monocytes Relative 6 4 - 12 %    Eosinophils Relative 2 0 - 6 %    Basophils Relative 0 0 - 1 %    Neutrophils Absolute 9 67 (H) 1 85 - 7 62 Thousands/µL    Immature Grans Absolute 0 10 0 00 - 0 20 Thousand/uL    Lymphocytes Absolute 0 84 0 60 - 4 47 Thousands/µL    Monocytes Absolute 0 70 0 17 - 1 22 Thousand/µL    Eosinophils Absolute 0 24 0 00 - 0 61 Thousand/µL    Basophils Absolute 0 05 0 00 - 0 10 Thousands/µL   APTT    Collection Time: 12/01/22  5:19 AM   Result Value Ref Range    PTT 71 (H) 23 - 37 seconds   Fingerstick Glucose (POCT)    Collection Time: 12/01/22  8:16 AM   Result Value Ref Range    POC Glucose 103 65 - 140 mg/dl   Prepare Leukoreduced RBC: 2 Units    Collection Time: 12/01/22  1:51 PM   Result Value Ref Range    Unit Product Code T3380F28     Unit Number [id number]     Unit ABO A     Unit [facility]     Crossmatch Compatible     Unit Dispense Status Crossmatched     Unit Product Volume 350 ml    Unit Product Code N2769S36     Unit Number [id number]     Unit ABO A     Unit [facility]     Crossmatch Compatible     Unit Dispense Status Crossmatched     Unit Product Volume 350 ml   Fingerstick Glucose (POCT)    Collection Time: 12/01/22  2:08 PM   Result Value Ref Range    POC Glucose 106 65 - 140 mg/dl   Hemoglobin and hematocrit, blood    Collection Time: 12/01/22  2:41 PM   Result Value Ref Range    Hemoglobin 9 1 (L) 11 5 - 15 4 g/dL    Hematocrit 29 3 (L) 34 8 - 46 1 %   Fingerstick Glucose (POCT)    Collection Time: 12/01/22  9:03 PM   Result Value Ref Range    POC Glucose 103 65 - 140 mg/dl   Prepare Leukoreduced RBC: 2 Units    Collection Time: 12/02/22  5:30 AM   Result Value Ref Range    Unit Product Code J5913N18     Unit Number [id number]     Unit ABO A     Unit [facility]     Crossmatch Compatible     Unit Dispense Status Presumed Trans     Unit Product Volume 350 ml    Unit Product Code D0433C83     Unit Number [id number]     Unit ABO A     Unit [facility]     Crossmatch Compatible     Unit Dispense Status Presumed Trans     Unit Product Volume 350 ml   CBC and differential    Collection Time: 12/02/22  5:56 AM   Result Value Ref Range    WBC 16 83 (H) 4 31 - 10 16 Thousand/uL    RBC 3 21 (L) 3 81 - 5 12 Million/uL    Hemoglobin 8 4 (L) 11 5 - 15 4 g/dL    Hematocrit 27 5 (L) 34 8 - 46 1 %    MCV 86 82 - 98 fL    MCH 26 2 (L) 26 8 - 34 3 pg    MCHC 30 5 (L) 31 4 - 37 4 g/dL    RDW 17 2 (H) 11 6 - 15 1 %    MPV 8 9 8 9 - 12 7 fL    Platelets 046 649 - 863 Thousands/uL    nRBC 0 /100 WBCs    Neutrophils Relative 87 (H) 43 - 75 %    Immat GRANS % 1 0 - 2 %    Lymphocytes Relative 4 (L) 14 - 44 %    Monocytes Relative 7 4 - 12 %    Eosinophils Relative 0 0 - 6 %    Basophils Relative 1 0 - 1 %    Neutrophils Absolute 14 66 (H) 1 85 - 7 62 Thousands/µL    Immature Grans Absolute 0 19 0 00 - 0 20 Thousand/uL    Lymphocytes Absolute 0 69 0 60 - 4 47 Thousands/µL    Monocytes Absolute 1 20 0 17 - 1 22 Thousand/µL    Eosinophils Absolute 0 01 0 00 - 0 61 Thousand/µL    Basophils Absolute 0 08 0 00 - 0 10 Thousands/µL   Basic metabolic panel    Collection Time: 12/02/22  5:56 AM   Result Value Ref Range    Sodium 139 135 - 147 mmol/L    Potassium 4 4 3 5 - 5 3 mmol/L    Chloride 106 96 - 108 mmol/L    CO2 23 21 - 32 mmol/L    ANION GAP 10 4 - 13 mmol/L    BUN 9 5 - 25 mg/dL    Creatinine 0 65 0 60 - 1 30 mg/dL    Glucose 121 65 - 140 mg/dL    Calcium 7 9 (L) 8 3 - 10 1 mg/dL    eGFR 96 ml/min/1 73sq m   Fingerstick Glucose (POCT)    Collection Time: 12/02/22  7:41 AM   Result Value Ref Range    POC Glucose 102 65 - 140 mg/dl   Hemoglobin and hematocrit, blood    Collection Time: 12/02/22 10:24 AM   Result Value Ref Range    Hemoglobin 8 7 (L) 11 5 - 15 4 g/dL    Hematocrit 27 4 (L) 34 8 - 46 1 %   APTT    Collection Time: 12/02/22 10:24 AM   Result Value Ref Range    PTT 70 (H) 23 - 37 seconds   Protime-INR    Collection Time: 12/02/22 10:24 AM   Result Value Ref Range    Protime 27 7 (H) 11 6 - 14 5 seconds    INR 2 61 (H) 0 84 - 1 19   Hepatic function panel    Collection Time: 12/02/22 10:24 AM   Result Value Ref Range    Total Bilirubin 0 43 0 20 - 1 00 mg/dL    Bilirubin, Direct 0 21 (H) 0 00 - 0 20 mg/dL    Alkaline Phosphatase 318 (H) 46 - 116 U/L    AST 25 5 - 45 U/L    ALT 12 12 - 78 U/L    Total Protein 5 5 (L) 6 4 - 8 4 g/dL    Albumin 1 0 (L) 3 5 - 5 0 g/dL   Fingerstick Glucose (POCT)    Collection Time: 12/02/22 11:47 AM   Result Value Ref Range    POC Glucose 105 65 - 140 mg/dl   Fingerstick Glucose (POCT)    Collection Time: 12/02/22  4:27 PM   Result Value Ref Range    POC Glucose 166 (H) 65 - 140 mg/dl       CTA head and neck w wo contrast    Result Date: 11/13/2022  Narrative: CTA NECK AND BRAIN WITH AND WITHOUT CONTRAST INDICATION: Stroke/TIA, determine embolic source stroke COMPARISON:   Same date CT TECHNIQUE:  Routine CT imaging of the Brain without contrast   Post contrast imaging was performed after administration of iodinated contrast through the neck and brain  Post contrast axial 0 625 mm images timed to opacify the arterial system  3D rendering was performed on an independent workstation  MIP reconstructions performed   Coronal reconstructions were performed of the noncontrast portion of the brain  Radiation dose length product (DLP) for this visit:  578 mGy-cm   This examination, like all CT scans performed in the [facility], was performed utilizing techniques to minimize radiation dose exposure, including the use of iterative reconstruction and automated exposure control  IV Contrast:  85 mL of iohexol (OMNIPAQUE)  IMAGE QUALITY:   Diagnostic FINDINGS: NONCONTRAST BRAIN PARENCHYMA:  Right parietal subacute ischemia  VENTRICLES AND EXTRA-AXIAL SPACES:  Normal for the patient's age  VISUALIZED ORBITS AND PARANASAL SINUSES:  Unremarkable  CERVICAL VASCULATURE AORTIC ARCH AND GREAT VESSELS:  Mild atherosclerotic disease of the arch, proximal great vessels and visualized subclavian vessels  No significant stenosis  RIGHT VERTEBRAL ARTERY CERVICAL SEGMENT:  Moderate stenosis at the origin  The vessel is normal in caliber throughout the neck  LEFT VERTEBRAL ARTERY CERVICAL SEGMENT:  Moderate stenosis at the origin  The vessel is normal in caliber throughout the neck  RIGHT EXTRACRANIAL CAROTID SEGMENT:  Bilateral common carotid retropharyngeal course with short segment moderate plaque stenosis  Normal bifurcation and cervical internal carotid artery  No stenosis or dissection  LEFT EXTRACRANIAL CAROTID SEGMENT:  Mild atherosclerotic disease of the distal common carotid artery and proximal cervical internal carotid artery without significant stenosis compared to the more distal ICA  NASCET criteria was used to determine the degree of internal carotid artery diameter stenosis  INTRACRANIAL VASCULATURE INTERNAL CAROTID ARTERIES:  Normal enhancement of the intracranial portions of the internal carotid arteries  Normal ophthalmic artery origins  Normal ICA terminus  ANTERIOR CIRCULATION:  Symmetric A1 segments and anterior cerebral arteries with normal enhancement  Normal anterior communicating artery   MIDDLE CEREBRAL ARTERY CIRCULATION:  M1 segment and middle cerebral artery branches demonstrate normal enhancement bilaterally  DISTAL VERTEBRAL ARTERIES:  Normal distal vertebral arteries  Posterior inferior cerebellar artery origins are normal  Normal vertebral basilar junction  BASILAR ARTERY:  Basilar artery is normal in caliber  Normal superior cerebellar arteries  POSTERIOR CEREBRAL ARTERIES: Both posterior cerebral arteries arises from the basilar tip  Both arteries demonstrate normal enhancement  Normal posterior communicating arteries  VENOUS STRUCTURES:  Normal  NON VASCULAR ANATOMY BONY STRUCTURES:  No acute osseous abnormality  SOFT TISSUES OF THE NECK:  Unremarkable  THORACIC INLET:  Small bilateral pleural effusions, partially visualized  Impression: Moderate bilateral vertebral artery origin plaque stenosis  Bilateral common carotid retropharyngeal course with short segment moderate plaque stenosis  No occlusion or dissection  Workstation performed: [id number]     XR chest portable    Result Date: 11/13/2022  Narrative: CHEST INDICATION:   sirs  COMPARISON:  Chest radiograph from November 10, 2022 EXAM PERFORMED/VIEWS:  XR CHEST PORTABLE FINDINGS: Cardiomediastinal silhouette appears unremarkable  Mild pulmonary edema  Bibasilar atelectasis  No pneumothorax  Trace layering effusions  Osseous structures appear within normal limits for patient age  Impression: Mild pulmonary edema and trace layering pleural effusions  No definite consolidation  If continued clinical concern, suggest follow-up frontal and lateral views for further evaluation  Workstation performed: [id number]     XR chest portable    Result Date: 11/10/2022  Narrative: CHEST INDICATION:   cough/wheezing  COMPARISON:  CXR 11/8/2022, abdomen CT 11/9/2022, chest CT 2/23/2018  EXAM PERFORMED/VIEWS:  XR CHEST PORTABLE FINDINGS: Cardiomediastinal silhouette appears unremarkable  Mild pulmonary venous congestion  Question trace right effusion  No pneumothorax  Osseous structures appear within normal limits for patient age  Impression: Mild pulmonary venous congestion  Question trace right effusion  Workstation performed: [id number]     XR chest portable    Result Date: 10/31/2022  Narrative: CHEST INDICATION:   new hypoxia, concern for CHF  COMPARISON:  Chest radiograph October 26, 2022 EXAM PERFORMED/VIEWS:  XR CHEST PORTABLE FINDINGS: Heart shadow is enlarged but unchanged from prior exam  Diffuse bilateral reticular opacities have increased since prior study  No definite pleural effusion  No pneumothorax  Osseous structures appear within normal limits for patient age  Impression: Pulmonary edema has increased since October 26, 2022  Workstation performed: [id number]     XR chest portable    Result Date: 10/27/2022  Narrative: CHEST INDICATION:   dyspnea  COMPARISON:  Chest radiograph dated 10/21/2022  EXAM PERFORMED/VIEWS:  XR CHEST PORTABLE FINDINGS: Cardiomediastinal silhouette appears unremarkable  Mild pulmonary vascular congestion  No pneumothorax or pleural effusion  Osseous structures appear within normal limits for patient age  Impression: Mild pulmonary vascular congestion  Workstation performed: [id number]     XR chest 1 view portable    Result Date: 10/22/2022  Narrative: CHEST INDICATION:   OREILLY  COMPARISON:  02/06/2020 EXAM PERFORMED/VIEWS:  XR CHEST PORTABLE 1 image FINDINGS: Cardiomediastinal silhouette appears unremarkable  The lungs are clear  No pneumothorax or pleural effusion  Osseous structures appear within normal limits for patient age  Impression: No acute cardiopulmonary disease  Workstation performed: [id number]     XR foot 3+ views RIGHT    Result Date: 10/22/2022  Narrative: RIGHT FOOT INDICATION:   necrotic R 5th digit  COMPARISON:  None VIEWS:  XR FOOT 3+ VW RIGHT Images: 3 FINDINGS: There is no acute fracture or dislocation  No focal areas of bony destruction  An inferior calcaneal spur is present  Spurring in the midfoot  No lytic or blastic osseous lesion  There are atherosclerotic calcifications  Soft tissue swelling about the 5th toe  Soft tissues are otherwise unremarkable  Impression: No acute osseous abnormality  Workstation performed: [id number]     CT head wo contrast    Result Date: 11/15/2022  Narrative: CT BRAIN - WITHOUT CONTRAST INDICATION:   Stroke, follow up Stroke re eval  COMPARISON:  CTA head and neck with and without contrast November 13, 2022  TECHNIQUE:  CT examination of the brain was performed  In addition to axial images, sagittal and coronal 2D reformatted images were created and submitted for interpretation  Radiation dose length product (DLP) for this visit:  1036 mGy-cm   This examination, like all CT scans performed in the [facility], was performed utilizing techniques to minimize radiation dose exposure, including the use of iterative reconstruction and automated exposure control  IMAGE QUALITY:  Diagnostic  FINDINGS: PARENCHYMA: Evolving acute infarct in right parietal-temporal-occipital lobes with questionable petechial cortical hemorrhage in right parietal lobe  No acute parenchymal hematoma  Decreased attenuation is noted in periventricular and subcortical white matter demonstrating an appearance that is statistically most likely to represent mild microangiopathic change  No intracranial mass, mass effect or midline shift  Arterial calcifications of carotid siphons  VENTRICLES AND EXTRA-AXIAL SPACES:  Normal for the patient's age  VISUALIZED ORBITS AND PARANASAL SINUSES:  Orbits are normal   Small right maxillary retention cyst  CALVARIUM AND EXTRACRANIAL SOFT TISSUES:  Normal      Impression: Evolving acute infarct in right parietal-temporal-occipital lobes with questionable petechial cortical hemorrhage in right parietal lobe  No new acute intracranial abnormality  The study was marked in [facility] for immediate notification   Workstation performed: [id number]     CT head wo contrast    Result Date: 11/13/2022  Narrative: CT BRAIN - WITHOUT CONTRAST INDICATION:   Mental status change, unknown cause change in mental status, on full anticoagulation  COMPARISON:  2/25/2018 TECHNIQUE:  CT examination of the brain was performed  In addition to axial images, sagittal and coronal 2D reformatted images were created and submitted for interpretation  Radiation dose length product (DLP) for this visit:  1838 mGy-cm   This examination, like all CT scans performed in the [facility], was performed utilizing techniques to minimize radiation dose exposure, including the use of iterative reconstruction and automated exposure control  IMAGE QUALITY:  Motion artifact limits portions of the study  Portions of the study were repeated  Some diagnostic information is obtained  FINDINGS: PARENCHYMA:  There is a moderate region of wedge-shaped hypodensity extending from the periventricular margin to the cortical surface predominantly in the right parietal lobe indicative of a moderate, acute/recent infarction  There is no large hemorrhage  There is local sulcal effacement and mass effect without subfalcine herniation  Atherosclerotic calcifications of the cavernous segment of the internal carotid artery are mild  VENTRICLES AND EXTRA-AXIAL SPACES:  Mild effacement of the occipital horn of the right lateral ventricle secondary to adjacent cytotoxic edema in the right parietal lobe  VISUALIZED ORBITS AND PARANASAL SINUSES:  Unremarkable  CALVARIUM AND EXTRACRANIAL SOFT TISSUES:  Normal      Impression: 1  Moderate-sized acute/recent infarction centered on the right parietal lobe with local mass effect  No acute intracranial hemorrhage   Workstation performed: [id number]     CTA ABDOMEN W RUN OFF W WO CONTRAST    Result Date: 11/9/2022  Narrative: CT ANGIOGRAM OF THE AORTA AND LOWER EXTREMITIES WITH IV CONTRAST INDICATION:  Chronic limb ischemia with tissue loss in both legs  Previous endovascular interventions  COMPARISON: To CTA studies and 2 arteriograms since October 27, 2022 TECHNIQUE:  CT angiogram examination of the abdomen, pelvis, and lower extremities was performed according to standard protocol with intravenous contrast   This examination, like all CT scans performed in the [facility], was performed utilizing techniques to minimize radiation dose exposure, including the use of iterative reconstruction and automated exposure control  3D reconstructions were performed an independent workstation, and are supplied for review  Rad dose 3041 mGy-cm IV Contrast:  145 mL of iohexol (OMNIPAQUE)  FINDINGS: VASCULAR STRUCTURES:   Proximal right common iliac artery stent does not look opacified on CTA  No other significant common or external iliac lesion on the right side  Right common femoral arteries mildly calcified but without focal stenosis  Common femoral bifurcation filling defect is no longer present  No clear evidence of residual dissection  Right SFA is diffusely small with multiple mild stenoses proximally  Long stent within mid and distal SFA seems patent but unable to determine any residual stenosis due to stent artifact and very small luminal caliber  Popliteal artery is patent with multiple mild stenoses throughout and diffusely small caliber  No focal flow-limiting stenosis is identified  Anterior tibial and posterior tibial arteries are intact to the ankle  Posterior tibial artery occludes proximal to the calcaneus  Dorsalis pedis artery is severely stenotic distally and possibly segmentally occluded  Peroneal artery is patent but communicating arteries are not opacified  In the left leg, there is a calcified stenosis at the origin the left common iliac difficult to quantify  It is not well seen on prior arteriograms  Based on reconstructions the lesion measures 30-40% in severity  No other common or external iliac lesion  No significant left common femoral stenosis  Left SFA is diffusely small and diseased  Recent proximal and distal stenting  Proximal left SFA stent appears patent  Distal stent is occluded  Popliteal artery is severely stenotic distal to the stent  Below-knee popliteal artery is diffusely stenotic with severe stenosis at origin of the anterior tibial artery  Anterior tibial artery is patent  Posterior tibial artery is only segmentally seen proximally and occluded distally  Peroneal artery is patent  Dorsalis pedis arteries patent  OTHER FINDINGS ABDOMEN LOWER CHEST:  Small bilateral pleural effusions  LIVER/BILIARY TREE:  Unremarkable  GALLBLADDER:  No calcified gallstones  No pericholecystic inflammatory change  SPLEEN:  Asymmetric hyperperfusion into the upper pole of uncertain significance  Not seen previously  Maybe related to phase of contrast   Splenic artery is patent  No suggestion of embolic occlusion  PANCREAS:  Unremarkable  ADRENAL GLANDS: Unremarkable  KIDNEYS/URETERS:  No solid renal mass  No hydronephrosis  No urinary tract calculi  PELVIS REPRODUCTIVE ORGANS:  Unremarkable for patient's age  URINARY BLADDER:  Jacinto in place  Nondistended bladder  ADDITIONAL ABDOMINAL AND PELVIC STRUCTURES STOMACH AND BOWEL:  Unremarkable  ABDOMINOPELVIC CAVITY:   New small volume ascites ABDOMINAL WALL/INGUINAL REGIONS:  Diffuse subcutaneous edema OSSEOUS STRUCTURES:  No acute fracture or destructive osseous lesion     Diffuse subcutaneous edema in both legs  Impression: 1  Suspect interval occlusion of recently placed proximal right common iliac artery stent 2  No residual filling defect or dissection involving right common femoral artery  Diffusely small diseased right SFA and popliteal artery without focal flow-limiting stenosis identified  Patent recently placed SFA stent  3   Intact anterior tibial artery with stenotic and/or occluded dorsalis pedis artery    Posterior tibial artery occludes at the ankle  Patent peroneal artery without opacification of anterior and posterior communicating arteries  4   Near circumferentially calcified stenosis of proximal left common iliac artery  The lesion measures 30-40% in severity by reconstructions which are compromised by artifact and software limitations and therefore difficult to exclude a focal flow-limiting stenosis within this segment  5   No significant left common femoral stenosis  SFA is diffusely diseased  Newly placed proximal stent is patent but distal SFA stent is occluded  Popliteal artery is diffusely stenotic  6   Left popliteal artery is diffusely stenotic which seems new from the prior arteriogram and may be related to acute stent occlusion  Left anterior tibial artery is intact  Posterior tibial artery occludes at the calcaneus  7   Small bilateral pleural effusions 8  Small volume ascites new from prior CT with diffuse subcutaneous edema Workstation performed: [id number]     CTA ABDOMEN W RUN OFF W WO CONTRAST    Result Date: 11/2/2022  Narrative: CT ANGIOGRAM OF THE AORTA AND LOWER EXTREMITIES WITH IV CONTRAST INDICATION:  Recent left lower extremity intervention with new decrease in ankle-brachial indices in the right lower extremity  COMPARISON: CTA, dated 10/27/2022  Fluoroscopy, dated 10/31/2022  TECHNIQUE:  CT angiogram examination of the abdomen, pelvis, and lower extremities was performed according to standard protocol with intravenous contrast   This examination, like all CT scans performed in the [facility], was performed utilizing techniques to minimize radiation dose exposure, including the use of iterative reconstruction and automated exposure control  Rad dose 2576 mGy-cm IV Contrast:  130 mL of iohexol (OMNIPAQUE)  FINDINGS: VASCULAR STRUCTURES:   ABDOMINAL VASCULAR STRUCTURES:   AORTA: The abdominal aorta is normal in caliber  There are moderate atherosclerotic calcifications   CELIAC ARTERY: The origin is normal in caliber  Branching anatomy is conventional   There is no atherosclerotic disease  SUPERIOR MESENTERIC ARTERY: Normal caliber, without atherosclerotic calcifications  INFERIOR MESENTERIC ARTERY: The ostia and visualized branches are normal  RIGHT RENAL ARTERY: The single renal artery is normal in caliber  LEFT RENAL ARTERY: The single renal artery has mild atherosclerotic calcifications at the origin  LEFT LOWER EXTREMITY: LEFT COMMON ILIAC ARTERY: Mild calcific atherosclerotic disease  The vessel is normal in caliber  LEFT INTERNAL ILIAC ARTERY: Mild atherosclerotic disease  Visualized branches are normal in caliber  LEFT EXTERNAL ILIAC ARTERY: Normal in caliber, without atherosclerotic disease  LEFT COMMON FEMORAL ARTERY: Mild calcific atherosclerotic disease  The vessel is normal in caliber  LEFT PROFUNDA FEMORIS: Mild calcific atherosclerotic disease  The vessel is normal in caliber  LEFT SUPERFICIAL FEMORAL ARTERY: Interval stenting and lithotripsy of the proximal and distal left superficial femoral artery  Peak hardening artifact limits evaluation of the intraluminal stent  Remainder of the superficial femoral artery is normal  LEFT POPLITEAL ARTERY: Moderate calcific atherosclerotic disease  The vessel is normal in caliber  LEFT TIBIOPERONEAL TRUNK: Mild calcific atherosclerotic disease  The vessel is normal in caliber  LEFT ANTERIOR TIBIAL ARTERY: Mild calcific atherosclerotic disease  The vessel is normal in caliber  LEFT PERONEAL ARTERY: Mild calcific atherosclerotic disease  The vessel is normal in caliber  LEFT POSTERIOR TIBIAL ARTERY: Mild calcific atherosclerotic disease  The vessel is normal in caliber  LEFT FOOT: Visualized branches are normal  RIGHT LOWER EXTREMITY: RIGHT COMMON ILIAC ARTERY: Interval stenting of the right common iliac artery  Beam hardening artifact limits evaluation of the intraluminal stent   RIGHT INTERNAL ILIAC ARTERY: Mild atherosclerotic disease  Visualized branches are normal in caliber  RIGHT EXTERNAL ILIAC ARTERY: Normal in caliber, without atherosclerotic disease  RIGHT COMMON FEMORAL ARTERY: Interval development of a probable flow-limiting dissection flap at the prior arteriotomy site  Moderate calcific atherosclerotic disease  The vessel is normal in caliber  Atherosclerotic disease  RIGHT PROFUNDA FEMORIS: Normal in caliber, without atherosclerotic disease  RIGHT SUPERFICIAL FEMORAL ARTERY: Focal severe atherosclerotic narrowing at the adductor canal   Otherwise moderate atherosclerotic disease throughout the remainder of the vessel  RIGHT POPLITEAL ARTERY: Moderate calcific atherosclerotic disease  The vessel is normal in caliber  RIGHT TIBIOPERONEAL TRUNK: Mild calcific atherosclerotic disease  The vessel is normal in caliber  RIGHT ANTERIOR TIBIAL ARTERY: Mild calcific atherosclerotic disease  The vessel is normal in caliber  RIGHT PERONEAL ARTERY: Mild calcific atherosclerotic disease  The vessel is normal in caliber  RIGHT POSTERIOR TIBIAL ARTERY: Mild calcific atherosclerotic disease  The vessel is normal in caliber  RIGHT FOOT: Visualized branches are normal  VENOUS: The iliocaval system is normal in caliber, without collateralization  OTHER FINDINGS ABDOMEN LIVER/BILIARY TREE:  Unremarkable  GALLBLADDER:  No calcified gallstones  No pericholecystic inflammatory change  SPLEEN:  Unremarkable  Normal size  PANCREAS:  Unremarkable  ADRENAL GLANDS: Punctate calcification in the lateral christine of the left adrenal gland, benign  Right adrenal gland is normal  KIDNEYS/URETERS:  No solid renal mass  No hydronephrosis  No urinary tract calculi  PELVIS REPRODUCTIVE ORGANS:  Unremarkable for patient's age  URINARY BLADDER:  Small amount of air in the urinary bladder likely reflects prior instrumentation   ADDITIONAL ABDOMINAL AND PELVIC STRUCTURES STOMACH AND BOWEL:  Large bowel is normal   Small bowel is normal   Stomach is normal  ABDOMINOPELVIC CAVITY:   No pathologically enlarged mesenteric or retroperitoneal lymph nodes  No ascites or free intraperitoneal air  ABDOMINAL WALL/INGUINAL REGIONS:  Diffuse anasarca  OSSEOUS STRUCTURES:  No acute fracture or destructive osseous lesion  Impression: Vascular: Left lower extremity: Interval stenting and lithotripsy of the left superficial femoral artery  Right lower extremity: 1  Interval development of a probable flow-limiting dissection flap at the prior arteriotomy site in the right common femoral artery superimposed upon moderate to severe atherosclerotic disease  2   Focal severe atherosclerotic narrowing at the adductor canal with additional moderate atherosclerotic narrowing throughout the remainder of the right superficial femoral artery  The study was marked in [employer] for significant notification  Workstation performed: [id number]     CTA ABDOMEN W RUN OFF W WO CONTRAST    Result Date: 10/28/2022  Narrative: CT ANGIOGRAM OF THE AORTA AND LOWER EXTREMITIES WITH IV CONTRAST INDICATION:  Aortoiliac disease  Nonhealing left heel wound  COMPARISON: CT of the left lower extremity with contrast on 10/21/2022  CT of the abdomen pelvis with contrast on 2/20/2022  TECHNIQUE:  CT angiogram examination of the abdomen, pelvis, and lower extremities was performed according to standard protocol with intravenous contrast   This examination, like all CT scans performed in the [facility], was performed utilizing techniques to minimize radiation dose exposure, including the use of iterative reconstruction and automated exposure control  3D reconstructions were performed an independent workstation, and are supplied for review  Rad dose 2492 mGy-cm IV Contrast:  130 mL of iohexol (OMNIPAQUE)  FINDINGS: VASCULAR STRUCTURES:   Visualized thoracoabdominal aorta is patent    The infrarenal abdominal demonstrates mild stenosis, see series 2 image 48 where atherosclerotic disease encroaches upon the lumen, in this region the aortic diameter measures 8 mm  The celiac artery, SMA, AL and bilateral renal arteries are patent  Accessory renal arteries are present bilaterally  RIGHT: Common iliac artery: 50% stenosis proximally Internal iliac artery and branches: Patent  External iliac artery: Patent  Common femoral artery: 50% stenosis  Profunda femoral artery and branches: Patent  Superficial femoral artery: 5075% stenosis of the proximal several centimeters  The mid to distal SFA demonstrates segmental regions of up to 75% stenosis  Popliteal artery: Scattered stenoses of up to 50-75%  Patent three-vessel runoff extending into the right foot  LEFT: Common iliac artery: 50% stenosis proximally  Internal iliac artery and branches: Patent  External iliac artery: Patent  Profunda femoral artery and branches: Patent  Common femoral artery: No significant stenosis  Superficial femoral artery: The proximal 3rd of the vessel demonstrates up to 75% stenosis  The distal SFA demonstrates up to 75% stenosis as well  Popliteal artery: P2 segment greater than 75% stenosis  Patent three-vessel runoff extending into the left foot  OTHER FINDINGS ABDOMEN LOWER CHEST:  Not visualized  LIVER/BILIARY TREE: Partially visualized  Unremarkable  GALLBLADDER:  No calcified gallstones  No pericholecystic inflammatory change  SPLEEN:  Partially visualized  Unremarkable  Normal size  PANCREAS:  Unremarkable  ADRENAL GLANDS: Unchanged small left lateral limb calcification, unchanged  Right adrenal gland unremarkable  KIDNEYS/URETERS:  No solid renal mass  No hydronephrosis  No urinary tract calculi  Right lateral lower pole cyst  PELVIS REPRODUCTIVE ORGANS:  Unremarkable for patient's age  IUD  URINARY BLADDER:  Antidependent air, correlate for recent instrumentation ADDITIONAL ABDOMINAL AND PELVIC STRUCTURES STOMACH AND BOWEL:  Unremarkable   ABDOMINOPELVIC CAVITY:   No pathologically enlarged mesenteric or retroperitoneal lymph nodes  No ascites or free intraperitoneal air  ABDOMINAL WALL/INGUINAL REGIONS:  Mild anasarca  OSSEOUS STRUCTURES:  No acute fracture or destructive osseous lesion  Impression: 1  Moderate stenosis of the mid infrarenal abdominal aorta secondary to calcific atherosclerotic disease  2  LEFT: Up to 50% stenosis of the proximal common iliac artery  The proximal and distal 3rd of the superficial femoral artery demonstrates 50-75% stenosis  Popliteal artery demonstrates greater than 75% stenosis in the P2 segment  Patent three-vessel runoff extending into the left foot  3  RIGHT: Proximal common iliac artery stenosis of up to 50%  50% stenosis of the common femoral artery  Superficial femoral artery demonstrates up to 50% stenosis within the proximal several centimeters, 50-75% stenosis throughout the distal 3rd of the vessel  Popliteal artery demonstrates scattered stenoses of 50-75%  Patent three-vessel runoff extending into the right foot  Workstation performed: [id number]     MRI ankle/heel left  wo contrast    Result Date: 10/27/2022  Narrative: MRI ANKLE/HEEL LEFT WO CONTRAST INDICATION:   left heel eschar, unknown depth, rule out osteomyelitis  COMPARISON:  Left foot and ankle CT from 10/21/2022  TECHNIQUE:   MR sequences were obtained of the ankle including: Localizers, coronal STIR, coronal T1, axial T2 fat sat, axial PD, sagittal T2 fat sat, sagittal T1 sequences were obtained  Gadolinium was not used  FINDINGS: Exam moderately limited due to patient motion  Subcutaneous Tissues: There is a large plantar lateral heel ulcer, without underlying soft tissue abscess (series 5 image 5-15 ) Joint Effusion: None   TENDONS: Achilles tendon: Normal  Peroneus brevis and longus: Normal  Posterior tibialis, flexor digitorum longus, flexor hallucis longus: Normal  Anterior tibialis, extensor digitorum longus, extensor hallucis longus:  Normal  LIGAMENTS: Lateral collateral ligament complex:  Normal  Distal tibiofibular syndesmosis:  Normal  Medial collateral ligament complex:  Normal  Plantar Fascia:  Normal  Articular Surfaces:  Normal  Sinus Tarsi:  Normal  Tarsal Tunnel: Unremarkable  Bones:  Associated with the plantar lateral heel ulcer, there is only very minimal reactive marrow edema in the calcaneal tubercle evident on T2-weighted sequences (series 3 image 2, series 7 image 8 ) No confluent T1-weighted marrow abnormality or cortical destruction to suggest osteomyelitis  Musculature:  Normal      Impression: Exam moderately limited due to patient motion  There is a large plantar lateral heel ulcer, without underlying soft tissue abscess (series 5 image 5-15 ) Associated with this ulcer, there is only very minimal reactive marrow edema in the calcaneal tubercle evident on T2-weighted sequences (series 3 image 2, series 7 image 8 ) No confluent T1-weighted marrow abnormality or cortical destruction to suggest osteomyelitis  Workstation performed: [id number]     VAS lower limb arterial duplex, complete bilateral    Result Date: 11/9/2022  Narrative:  [facility] REPORT CLINICAL: Indications:  Evaluation s/p intervention  Bilateral lower extremity ulcerations  Operative History: 2022-11-07 Right CFA arterectomy w/  PTA 2022-11-07 Right Popliteal PTA and Posterior tibial PTA 2022-11-07 Right SFA PTA/ stent placement 2022-10-31 Abdominal aortogram 2022-10-31 Right ALLISON Stent 2022-10-31 Right PTA stent 2022-10-31 Left SFA shockwave angioplasty Risk Factors The patient has history of Obesity, HTN, Diabetes, CVA, Hyperlipidemia and previous smoking   Clinical Right Pressure:  188/ mm Hg, Left Pressure: IV  FINDINGS:  Segment                Right            Left                                          PSV (cm/s)  EDV  Impression  PSV (cm/s)  EDV  Common Femoral Artery          81   17                     145   20  Prox Profunda                 174   16                     230   12  Prox SFA 143   14                      89    8  Mid SFA                        90    9  Occluded             0    0  Dist SFA                       70    8  Occluded             0    0  Proximal Pop                   41   11                      27   11  Distal Pop                     50    5                      20    7  Tibioperoneal                  40                           39       Dist Post Tibial                8    0                       0    0  Dist  Ant  Tibial              21    0                       8    3     CONCLUSION: Impression: RIGHT LOWER LIMB: Monophasic waveforms in the common femoral artery suggesting more proximal disease with diffuse disease throughout the remaining portions of the femoral-popliteal arteries without significant focal stenosis  Findings suggests tibioperoneal disease  Ankle/Brachial index: unobtainable secondary to indistinguishable Doppler signal, previous study 0 51  PVR tracing at the ankle is dampened  The PVR tracing at the metatarsal level and PPG waveform of the great toe are flat lined, suggesting poor distal perfusion, therefore, pressures are unobtainable  LEFT LOWER LIMB: An occlusion of the mid superficial femoral artery with minimal distal reconstitution  Findings suggests tibioperoneal disease  Ankle/Brachial index: unobtainable secondary to indistinguishable Doppler signal, previous study 0 74  The PVR tracings at the ankle and metatarsal levels are flat lined with the PPG waveform of the great toe is flat lined, suggesting poor distal perfusion, therefore, pressures are unobtainable  In comparison to the studies dated 10/24/2022 and 11/1/2022, there is progression in the disease process bilaterally  SIGNATURE: Electronically Signed by: Hernan Fang on 2022-11-09 12:42:16 PM    VAS lower limb arterial duplex, complete bilateral    Result Date: 10/24/2022  Narrative:  [facility]: Indications:  Left heel ulceration   Risk Factors: Obesity, HTN, Diabetes, CVA, Hyperlipidemia and previous smoking  Clinical Right Pressure:  158/ mm Hg, Left Pressure:  168/ mm Hg  FINDINGS:  Segment                Right       Left                                          PSV (cm/s)  PSV (cm/s)  Common Femoral Artery          72         103  Prox Profunda                  92         164  Prox SFA                       86          45  Mid SFA                        93          60  Dist SFA                       57          97  Proximal Pop                   83          63  Distal Pop                    111          79  Tibioperoneal                 100          96  Dist Post Tibial               38           7  Dist  Ant  Tibial              36          46     CONCLUSION: Impression: RIGHT LOWER LIMB: Monophasic waveforms in the common femoral artery suggesting more proximal disease  Diffuse atherosclerotic disease throughout the femoral-popliteal arteries without focal stenosis  Findings suggests tibioperoneal disease  Ankle/Brachial index:  0 80, moderate claudication range  PVR/ PPG tracings are dampened  Metatarsal pressure of 64 mm Hg Great toe pressure of 61 mm Hg, above healing threshold for a diabetic  LEFT LOWER LIMB: Diffuse atherosclerotic disease throughout the femoral-popliteal arteries without focal stenosis  Findings suggests tibioperoneal disease  Ankle/Brachial index:  0 63, severe claudication range  PVR/ PPG tracings are dampened  Metatarsal pressure of 21 mm Hg Great toe pressure of 35 mm Hg, below healing threshold for a diabetic  SIGNATURE: Electronically Signed by: Taylor Wong on 2022-10-24 02:00:50 PM    VAS lower limb arterial duplex, limited, unilateral    Result Date: 11/2/2022  Narrative:  [facility]: Indications:  Evaluated the right CFA and ilaic due to SHARIF decrease after angio/ stent   Operative History: 2022-10-31 Abdominal aortogram 2022-10-31 Right ALLISON Stent 2022-10-31 Right PTA stent 2022-10-31 Left SFA shockwave angioplasty Risk Factors The patient has history of Obesity, HTN, Diabetes (IDDM), Hyperlipidemia and previous smoking (quit >10yrs ago)  FINDINGS:  Right                  PSV (cm/s)  Dist EIA                      682  Common Femoral Artery         825  Prox SFA                      391     CONCLUSION:   Impression: There is a high grade stenosis in the right common femoral artery / proximal femoral artery  SIGNATURE: Electronically Signed by: Nikolas Penaloza on 2022-11-02 04:58:20 PM    VAS SHARIF & waveform analysis, multiple levels    Result Date: 11/1/2022  Narrative:  [facility]: Indications:  SHARIF follow post-op aortogram, LLE run off, PTA stent  Operative History: 2022-10-31 Abdominal aortogram 2022-10-31 Left ALLISON Stent 2022-10-31 Left PTA stent 2022-10-31 Left SFA shockwave angioplasty Risk Factors The patient has history of Obesity, HTN, Diabetes (IDDM), Hyperlipidemia and previous smoking (quit >10yrs ago)  Clinical Right Pressure:  138/ mm Hg, Left Pressure:  158/ mm Hg  FINDINGS:  Segment       Ri  Left                         P    P  Peroneal      76   91  Post  Tibial  81  117  Ankle         81  117  Metatarsal    45   85  Great Toe     50   37     CONCLUSION:  Impression RIGHT LOWER LIMB Ankle/Brachial Index:0 51 wiyh in severe limits (prior 0 63) PPG/PVR Tracings are dampened  Metatarsal Pressure 45 mmHg Great Toe Pressure: 50 mmHg, within the healing range  LEFT LOWER LIMB Ankle/Brachial Index: 0 74 within moderate limits (prior 0 63) PPG/PVR Tracings are dampened  Metatarsal Pressure 85 mmHg Great Toe Pressure: 37 mmHg, below the healing range    SIGNATURE: Electronically Signed by: Nikolas Peanloza on 2022-11-01 03:41:46 PM    VAS lower limb vein mapping bypass graft    Result Date: 11/3/2022  Narrative:  [facility]: Indications: Patient presents for a general health evaluation secondary to future open heart surgery  Patient is asymptomatic at this time  Operative History: 2022-10-31 Abdominal aortogram 2022-10-31 Right ALLISON Stent 2022-10-31 Right PTA stent 2022-10-31 Left SFA shockwave angioplasty Risk Factors The patient has history of Obesity, HTN, Diabetes (IDDM), Hyperlipidemia and previous smoking (quit >10yrs ago)  FINDINGS:  Segment         Right        Left                            Diameter AP  Diameter AP  GSV Inguinal            9 8          6 1  GSV Prox Thigh          3 5          4 9  GSV Mid Thigh           3 2          3 0  GSV Dist Thigh          3 0          4 7  GSV Knee                2 9          3 3  GSV Prox Calf           3 4          3 3  GSV Mid Calf            3 4          3 7  GSV Dist Calf           3 5          3 3  SSV Mid Calf            2 5          2 0  SSV Knee                1 9          2 1  SSV Ankle               2 5          2 4     CONCLUSION:  Impression: RIGHT LOWER LIMB: The great saphenous vein is patent  from the groin to the ankle  The vein measures >2mm in caliber from the groin to the distal calf  LEFT LOWER LIMB: The great saphenous vein is patent  from the groin to the ankle  The vein measures >2mm in caliber from the groin to the distal calf  SIGNATURE: Electronically Signed by: Lexie Merino on 2022-11-03 09:45:42 AM    IR aortagram with run-off    Result Date: 11/1/2022  Narrative: PROCEDURE: Ultrasound-guided access of the right common femoral artery  Right common femoral artery sheath arteriogram  Aortogram  Left lower extremity runoff arteriogram  Shockwave balloon lithotripsy of proximal SFA  Proximal SFA Rosalinda stent placement  Shockwave balloon lithotripsy of the distal SFA  Distal SFA Rosalinda stent placement  Postintervention SFA and lower leg arteriograms  Right common iliac artery arteriogram  Right common iliac artery Express stent placement   Post stent placement RCIA arteriogram  Successful right common femoral artery closure with a ProGlide device  STAFF: KELLY Griffith  CONTRAST: 180 mL Visapaque FLUOROSCOPY TIME: 34 8 minutes  NUMBER OF IMAGES: Multiple COMPLICATIONS: None  MEDICATIONS: Local lidocaine  Heparin: 6000 units Moderate sedation with fentanyl and Versed was monitored by radiology nursing staff  Monitoring of conscious sedation totaled 180 minutes  INDICATION: Nonhealing left foot /heel wounds  CTA and arterial duplex ultrasound with evidence of significant peripheral vascular disease  PROCEDURE: The patient was placed supine on the procedure table  The right groin was prepped and draped in a sterile manner  Timeout was performed  Ultrasound-guided access of the right common femoral artery was obtained with a micropuncture needle  The access was upsized and a 5 [employer] [place] sheath was placed  Access arteriogram was used to confirm that the sheath was not occlusive  Using a SOS flush catheter,  access to the contralateral SFA was obtained with a [employer] wire, over which a 6 [employer] [place] by 65 cm destination sheath was placed within the left distal external iliac artery  A limited arteriogram was performed through the sheath at the aortic bifurcation, to confirm that the sheath was not occlusive within the known right common iliac artery proximal lesion  Left lower leg runoff arteriogram was performed, demonstrating a proximal SFA focal severe stenosis secondary to eccentric calcific atherosclerotic disease  A similar lesion was identified near the region of the abductor canal  The remainder of the SFA, popliteal artery are patent without significant stenosis  2 vessel runoff via the anterior tibial and peroneal arteries  A 0 014 wire was then advanced across the proximal SFA lesion  Shockwave balloon lithotripsy was performed for several rounds with a 5 mm x 6 cm Shockwave balloon  Postintervention arteriogram revealed no significant change within the focal eccentric plaque   A 6 mm x 6 cm Rosalinda stent was placed across this region, post dilated with a 5 mm  balloon  At completion no significant residual stenosis  The wire was then advanced across the distal SFA lesion  Shockwave balloon lithotripsy was performed for several rounds as above  This resulted in irregular dissection proximal to the lesion  The dissection as well as residual stenosis was stented with 6mm x 10cm Rosalinda stent postdilated with a 5 mm  balloon  Postintervention arteriogram with no significant residual stenosis  Completion left lower leg and foot arteriograms were performed, demonstrating preserved 2 vessel runoff as described above, with qualitatively increased rate of flow to the foot  The sheath was then retracted to the proximal right common iliac artery  Arteriogram was performed demonstrating up to 75% stenosis secondary to a focal region of eccentric calcific plaque  This was stented with a 7 mm x 17mm Express Stent  Post intervention arteriogram without residual stenosis  Successful access closure with a Proglide device  FINDINGS: 1  Ultrasound demonstrated the right common femoral artery to the patent  2  Left lower leg runoff arteriogram demonstrated a proximal SFA focal severe stenosis secondary to eccentric calcific atherosclerotic disease  A similar lesion was identified near the region of the abductor canal  The remainder of the SFA, popliteal artery are patent without significant stenosis  2 vessel runoff via the anterior tibial and peroneal arteries  Successful SFA shockwave lithotripsy and Rosalinda stent placement x 2 as described above  3  Mid right common iliac artery severe focal stenosis secondary to eccentric calcific plaque  Successful Express stent placement as described above   4  Preserved two vessel runoff on the left via the anterior tibial and peroneal arteries with qualitatively increased rate of flow compared to the preintervention arteriogram      Impression: Left lower leg arteriogram with proximal and distal SFA shockwave balloon lithotripsy and stent placement, as well as right common iliac artery stent placement  PLAN: Follow-up arterial duplex  Patient became short of breath after the procedure, with increased O2 requirement  Etiology most likely volume overload secondary to renal prep as well as contrast and fluid was given during the procedure where she was lying flat  Dr Jeni Carpenter from the primary team made aware  Chest x-ray to be obtained upon arrival to the floor  Workstation performed: [id number]     XR chest portable ICU    Result Date: 11/9/2022  Narrative: CHEST INDICATION:   Increased o2 requirement  COMPARISON:  10/31/2022 EXAM PERFORMED/VIEWS:  XR CHEST PORTABLE ICU FINDINGS:  Patient rotation limits evaluation of the cardiomediastinal structures  Grossly stable cardiomediastinal structures  Central pulmonary vascular congestion with perihilar and basilar interstitial edema  No large effusions  No pneumothorax  No acute osseous abnormality  Stable bony structures  Impression: Moderate pulmonary edema, similar to the prior examination  No large effusions  Workstation performed: [id number]     CT lower extremity w contrast left    Result Date: 10/22/2022  Narrative: CT left lower extremity with IV contrast INDICATION: L foot necrosis  COMPARISON: None TECHNIQUE: CT examination of the above was performed  This examination, like all CT scans performed in the [facility], was performed utilizing techniques to minimize radiation dose exposure, including the use of iterative reconstruction  and automated exposure control software  Sagittal and coronal two dimensional reconstructed images were also submitted for interpretation  IV Contrast: 100 mL of iohexol (OMNIPAQUE) Rad dose  592 mGy-cm FINDINGS: OSSEOUS STRUCTURES:  No acute fracture, dislocation or destructive osseous lesion  Mild plantar calcaneal spurring  Mild degenerative changes of the ankle joint VISUALIZED MUSCULATURE:  Unremarkable   SOFT TISSUES:  Atherosclerosis  Moderate superficial soft tissue edema in the foot, ankle, and leg, superimposed cellulitis considered in the appropriate clinical setting  No discrete drainable collection identified  OTHER PERTINENT FINDINGS:  Enthesopathy of the anterior patella along the extensor mechanism attachment  No discrete subcutaneous gas is identified     Impression: No acute bony process is seen  Moderate superficial soft tissue edema in the foot, ankle, and leg, superimposed cellulitis considered in the appropriate clinical setting  Correlation with patient's symptoms and laboratory values recommended  No discrete drainable collection identified  Other findings as above  Workstation performed: [id number]     MRI follow up msk    Result Date: 10/28/2022  Narrative: MRI LEFT FOOT INDICATION:   Inpatient Order left heel eschar, unknown depth, rule out osteomyelitis  COMPARISON: Correlation is made with the prior CT study dated 10/21/2022  TECHNIQUE:  MR sequences were obtained of the left foot including the following:  Localizer, no further images were obtained as the patient could not tolerate the examination due to pain  Imaging performed on 1 5T MRI Gadolinium was not used FINDINGS: The study is nondiagnostic is only localizer images were obtained  The patient could not continue the examination due to underlying pain  Impression: Nondiagnostic study  Workstation performed: [id number]     Echo complete w/ contrast if indicated    Result Date: 10/30/2022  Narrative: •  Left Ventricle: Left ventricular cavity size is normal  There is mild to moderate concentric hypertrophy  The left ventricular ejection fraction is 60% by visual estimation  Systolic function is normal  Wall motion is normal  Diastolic function is normal  •  Right Ventricle: Right ventricular cavity size is normal  Systolic function is normal  •  Aortic Valve:  There is aortic sclerosis without clear visualization of the valve leaflets  •  Mitral Valve: There is mild annular calcification  There is trace regurgitation  •  Tricuspid Valve: There is trace regurgitation  Pulmonary artery systolic pressures are estimated at 31 mm Hg  •  There is no study for comparison  Echo follow up/limited w/ contrast if indicated    Result Date: 11/14/2022  Narrative: •  Left Ventricle: Left ventricular cavity size is normal  Wall thickness is normal  The left ventricular ejection fraction is 61%  Systolic function is normal  Wall motion is normal  Diastolic function is normal  •  Mitral Valve: There is mild annular calcification  Compared to prior echocardiographic study dated 10/30/2022, there is no significant change  Labs and pertinent reports reviewed  This note has been generated by voice recognition software system  Therefore, there may be spelling, grammar, and or syntax errors  Please contact if questions arise

## 2022-12-02 NOTE — UTILIZATION REVIEW
Continued Stay Review    Date:  12/2/22    POD #1 R AKA and wound debridement    Current Patient Class: IP Current Level of Care: MS    HPI:62 y o  female initially admitted on 10/21    Assessment/Plan:   Pt had episode of HTN yesterday  Improved today  Remains on 3/5 L NC oxygen down from 4L earlier  Pt is POD #1 R AKA and will have dressing taken down 12/3  Continue PO analgesia  Had Exparel to nerve intraop, Tylenol 975mg cyndy QID, increase gabapentin 400mg TID, add robaxin 1000mg cyndy TID, continue dilaudid PCA  Agratroban drip restarted post op  Doing as expected POD #1 - has pain along bilat stump sites, not fully controlled with PCA  No CP or SOB, tolerating diet    Hgb 8 7 and low calcium today      +++++++++++++++++++  12/1 OPERATIVE NOTE  Preop Diagnosis:  PAD (peripheral artery disease) ([facility]) [I73 9]     Post-Op Diagnosis Codes:     * PAD (peripheral artery disease) ([facility]) [I73 9]     Procedure(s) (LRB):  Right above knee amputation     cc    Anesthesia: General     Operative Findings:  Right thigh with full thickness necrotic irregular wounds- sharp excisional debridement carried out using scalpel and currette  +++++++++++++++++++    Vital Signs:   12/02/22 1146 98 2 °F (36 8 °C) 97 18 132/60 86 93 % -- -- -- None (Room air) Lying   12/02/22 0827 97 8 °F (36 6 °C) 96 18 129/71 92 98 % 34 -- 3 5 L/min Nasal cannula Lying   12/02/22 0500 -- -- -- -- -- 100 % 36 -- 4 L/min Nasal cannula --   12/02/22 0339 98 2 °F (36 8 °C) 95 20 167/69 99 100 % 34 -- 3 5 L/min Nasal cannula Lying   12/02/22 0030 -- -- -- -- -- 97 % 36 -- 4 L/min Nasal cannula --   12/01/22 2356 99 8 °F (37 7 °C) 103 20 158/92 116 98 % 36 -- 4 L/min Nasal cannula Lying   12/01/22 1721 98 5 °F (36 9 °C) 101 20 132/75 95 98 % -- -- -- Nasal cannula Lying   12/01/22 1619 98 6 °F (37 °C) 92 18 163/70 -- 97 % 36 -- 4 L/min Nasal cannula --   12/01/22 1603 -- 92 18 172/74 Abnormal  -- 98 % -- 4 L/min -- Nasal cannula --   12/01/22 1551 -- 90 14 173/72 Abnormal  -- 98 % 36 -- 4 L/min Nasal cannula --   12/01/22 1543 -- 90 19 184/75 Abnormal  -- 97 % 36 -- 4 L/min Nasal cannula --   12/01/22 1535 -- 90 15 194/76 Abnormal  -- 98 % 36 -- 4 L/min Nasal cannula --   12/01/22 1518 -- 90 16 194/84 Abnormal  -- 98 % 36 -- 4 L/min Nasal cannula --   12/01/22 1517 -- 90 15 197/83 Abnormal  119 98 % 36 -- 4 L/min Nasal cannula --   12/01/22 1448 -- 90 17 183/77 Abnormal  110 90 % -- -- -- -- --   12/01/22 1433 -- 84 18 204/86 Abnormal  124 99 % -- -- -- -- --   12/01/22 1418 -- 80 18 179/75 Abnormal  108 100 % -- -- -- -- --   12/01/22 1403 97 6 °F (36 4 °C) 78 13 182/79 Abnormal  -- 100 % 44 -- 6 L/min Simple mask --   12/01/22 1117 -- 84 16 171/76 Abnormal  -- 94 % 28 -- 2 L/min Nasal cannula --   12/01/22 1107 -- 88 16 187/78 Abnormal  -- 96 % 28 -- 2 L/min Nasal cannula --   12/01/22 0728 97 4 °F (36 3 °C) Abnormal  -- -- -- -- -- -- -- -- -- --   12/01/22 0722 -- 89 20 143/67 97 95 % 28 2 L/min 2 L/min Nasal cannula Lying   12/01/22 0525 -- -- -- -- -- 96 % 28 -- 2 L/min Nasal cannula --   12/01/22 0330 98 1 °F (36 7 °C) 96 20 128/76 106 95 % 28 -- 2 L/min Nasal cannula Lying     Pertinent Labs/Diagnostic Results:       Results from last 7 days   Lab Units 12/02/22  1024 12/02/22  0556 12/01/22  1441 12/01/22  0519 11/29/22  0913   WBC Thousand/uL  --  16 83*  --  11 60* 14 10*   HEMOGLOBIN g/dL 8 7* 8 4* 9 1* 6 8* 7 6*   HEMATOCRIT % 27 4* 27 5* 29 3* 22 4* 23 8*   PLATELETS Thousands/uL  --  372  --  326 320   NEUTROS ABS Thousands/µL  --  14 66*  --  9 67* 12 24*         Results from last 7 days   Lab Units 12/02/22  0556 12/01/22  0519 11/30/22  1927 11/29/22  0614 11/28/22  0532 11/27/22  0545 11/26/22  0339   SODIUM mmol/L 139 140 138 137 140   < > 138   POTASSIUM mmol/L 4 4 3 4* 3 1* 3 0* 3 1*   < > 3 1*   CHLORIDE mmol/L 106 104 101 100 105   < > 101   CO2 mmol/L 23 31 31 30 29   < > 29   ANION GAP mmol/L 10 5 6 7 6   < > 8   BUN mg/dL 9 7 8 6 9 < > 8   CREATININE mg/dL 0 65 0 70 0 73 0 67 0 67   < > 0 75   EGFR ml/min/1 73sq m 96 93 89 95 95   < > 86   CALCIUM mg/dL 7 9* 7 4* 7 6* 7 7* 7 7*   < > 7 5*   MAGNESIUM mg/dL  --   --   --   --   --   --  1 7   PHOSPHORUS mg/dL  --   --   --   --   --   --  2 8    < > = values in this interval not displayed       Results from last 7 days   Lab Units 12/02/22  1024 11/26/22  0339   AST U/L 25 45   ALT U/L 12 27   ALK PHOS U/L 318* 424*   TOTAL PROTEIN g/dL 5 5* 5 8*   ALBUMIN g/dL 1 0* 1 1*   TOTAL BILIRUBIN mg/dL 0 43 0 34   BILIRUBIN DIRECT mg/dL 0 21*  --      Results from last 7 days   Lab Units 12/02/22  1147 12/02/22  0741 12/01/22  2103 12/01/22  1408 12/01/22  0816 11/30/22  2127 11/30/22  1601 11/30/22  1104 11/30/22  0730 11/29/22  2124 11/29/22  1626 11/29/22  1139   POC GLUCOSE mg/dl 105 102 103 106 103 108 103 82 91 96 91 98     Results from last 7 days   Lab Units 12/02/22  0556 12/01/22  0519 11/30/22  1927 11/29/22  0614 11/28/22  0532 11/27/22  0545 11/26/22  0339   GLUCOSE RANDOM mg/dL 121 103 113 89 114 119 174*     Results from last 7 days   Lab Units 12/02/22  1024 12/01/22  0519 11/30/22  1735   PROTIME seconds 27 7*  --   --    INR  2 61*  --   --    PTT seconds 70* 71* 71*         Results from last 7 days   Lab Units 11/28/22  0532 11/27/22  0545   PROCALCITONIN ng/ml 0 18 0 17     Results from last 7 days   Lab Units 11/29/22  0614   FERRITIN ng/mL 583*     Medications:   Scheduled Medications:  acetaminophen, 975 mg, Oral, Q6H MICHELLE  amLODIPine, 10 mg, Oral, Daily  atorvastatin, 40 mg, Oral, Daily With Dinner  clopidogrel, 75 mg, Oral, Daily  doxazosin, 2 mg, Oral, Daily  gabapentin, 400 mg, Oral, TID  insulin glargine, 10 Units, Subcutaneous, HS  insulin lispro, 1-6 Units, Subcutaneous, TID AC  methocarbamol, 1,000 mg, Oral, Q8H MICHELLE  nystatin, , Topical, BID  pantoprazole, 40 mg, Oral, Early Morning  polyethylene glycol, 17 g, Oral, BID  potassium chloride, 40 mEq, Oral, BID  QUEtiapine, 25 mg, Oral, HS  senna, 1 tablet, Oral, BID  sertraline, 150 mg, Oral, Daily      Continuous IV Infusions:  argatroban, 0 1-3 mcg/kg/min, Intravenous, Titrated  HYDROmorphone, , Intravenous, Continuous  sodium chloride, 20 mL/hr, Intravenous, Continuous      PRN Meds:  ALPRAZolam, 0 25 mg, Oral, Daily PRN  metoclopramide, 10 mg, Intravenous, Q6H PRN  naloxone, 0 04 mg, Intravenous, Q1MIN PRN  ondansetron, 4 mg, Intravenous, Q6H PRN    Discharge Plan:D    Network Utilization Review Department  ATTENTION: Please call with any questions or concerns to [phone] and carefully listen to the prompts so that you are directed to the right person  All voicemails are confidential   Altaf Guidry all requests for admission clinical reviews, approved or denied determinations and any other requests to dedicated fax number below belonging to the campus where the patient is receiving treatment   List of dedicated fax numbers for the Facilities:  [address] DENIALS (Administrative/Medical Necessity) [phone]   [address] (Maternity/NICU/Pediatrics) [phone]   [address] [phone]   [place] [address] [phone]   [facility] [address] [place] [id number] [place] [address] [address] [phone]   [address] [place] [address] [facility] 134 815 [facility] [phone]

## 2022-12-02 NOTE — OCCUPATIONAL THERAPY NOTE
Occupational Therapy Progress Note     Patient Name: Reuben Palencia  NDTMD'M Date: 12/2/2022  Problem List  Principal Problem:    PAD (peripheral artery disease) ([facility] )  Active Problems:    Acute respiratory failure with hypoxia (HCC)    Depression, recurrent (HCC)    Benign essential hypertension    Stroke ([facility] )    Type 2 diabetes mellitus with hyperglycemia, with long-term current use of insulin (HCC)    Hypokalemia    Acute on chronic anemia    Diabetic ulcer of heel associated with diabetes mellitus due to underlying condition ([facility])    HIT (heparin-induced thrombocytopenia)    Febrile    Diarrhea          12/02/22 1356   OT Last Visit   OT Visit Date 12/02/22   Note Type   Note Type Treatment   Pain Assessment   Pain Assessment Tool 0-10   Pain Score 8   Pain Location/Orientation Orientation: Bilateral;Location: Leg   Hospital Pain Intervention(s) Emotional support   Pain Rating: FLACC (Rest) - Face 0   Pain Rating: FLACC (Rest) - Legs 0   Pain Rating: FLACC (Rest) - Activity 0   Pain Rating: FLACC (Rest) - Cry 1   Pain Rating: FLACC (Rest) - Consolability 1   Score: FLACC (Rest) 2   Pain Rating: FLACC (Activity) - Face 1   Pain Rating: FLACC (Activity) - Legs 0   Pain Rating: FLACC (Activity) - Activity 0   Pain Rating: FLACC (Activity) - Cry 1   Pain Rating: FLACC (Activity) - Consolability 1   Score: FLACC (Activity) 3   Restrictions/Precautions   Weight Bearing Precautions Per Order Yes   RLE Weight Bearing Per Order   (aKA)   LLE Weight Bearing Per Order   (AKA)   Other Precautions Chair Alarm;Cognitive; Bed Alarm; Fall Risk;Pain;WBS   ADL   Where Assessed   (upright bed)   Eating Assistance 4  Minimal Assistance   Eating Deficit Setup; Increased time to complete;Supervision/safety;Verbal cueing   Grooming Assistance 3  Moderate Assistance   Grooming Deficit Setup;Verbal cueing;Supervision/safety; Increased time to complete   Grooming Comments assist to wash face   Therapeutic Exercise - ROM   UE-ROM Yes ROM- Right Upper Extremities   R Shoulder AROM; Flexion; Extension   R Elbow AROM;Elbow flexion;Elbow extension  (forearm pronation/supination, punchouts)   R Hand AROM   R Weight/Reps/Sets 2 sets x 10 reps   RUE ROM Comment tolerated well, assist to initiate   ROM - Left Upper Extremities    L Shoulder Flexion; Extension;AAROM   L Elbow AAROM;Elbow flexion;Elbow extension;Prolonged stretch  (forearm pronation/supination)   L Weight/Reps/Sets 2 sets x 10 reps   LUE ROM Comment increased tone noted   Cognition   Overall Cognitive Status Impaired   Arousal/Participation Responsive; Cooperative   Attention Attends with cues to redirect   Orientation Level Oriented to place;Oriented to person;Oriented to time   Memory Decreased recall of recent events;Decreased short term memory;Decreased recall of precautions   Following Commands Follows one step commands with increased time or repetition   Comments flat affect, increased processing time, encouragement to participate   Additional Activities   Additional Activities   (educaion on participation)   Additional Activities Comments pt receptive   Activity Tolerance   Activity Tolerance Patient limited by fatigue;Patient limited by pain;Treatment limited secondary to medical complications (Comment)   Medical Staff Made Aware appropriate to see per RN Clara   Assessment   Assessment Pt seen for skilled OT session focused on ADLs, UE exercises  Pt is now s/p R AKA  Pt w/ increased pain in b/l residual limbs  Pt required encouragement to participate in session  Pt completed b/l UE exercises seen above AROM on R UE and AAROM L UE w/ end range stretching, pt w/ cues to maintain alertness t/o session  Pt w/ increased pain declined further activity despite encouragement  Pt continues to be limited due to decreased strength and endurance, impaired balance, impaired activity tolerance, increased pain, impaired cognition all causing a decline in ADLs, functional transfers and mobility  Recommend STR when medically stable  Will continue to follow to address OT POC  Due to pt increased pain and decreased activity tolerance will decrease POC to 2-3x/wk  The patient's raw score on the AM-PAC Daily Activity inpatient short form is 11, standardized score is 29 04, less than 39 4  Patients at this level are likely to benefit from discharge to post-acute rehabilitation services  Please refer to the recommendation of the Occupational Therapist for safe discharge planning  Plan   Treatment Interventions ADL retraining;Functional transfer training;UE strengthening/ROM; Endurance training;Cognitive reorientation;Patient/family training;Neuromuscular reeducation; Compensatory technique education; Energy conservation; Activityengagement   Goal Expiration Date 12/14/22   OT Treatment Day 11   OT Frequency 2-3x/wk   Recommendation   OT Discharge Recommendation Post acute rehabilitation services   [facility] Daily Activity Inpatient   Lower Body Dressing 1   Bathing 1   Toileting 1   Upper Body Dressing 2   Grooming 3   Eating 3   Daily Activity Raw Score 11   Daily Activity Standardized Score (Calc for Raw Score >=11) 29 04   AM-[facility] Applied Cognition Inpatient   Following a Speech/Presentation 3   Understanding Ordinary Conversation 3   Taking Medications 3   Remembering Where Things Are Placed or Put Away 3   Remembering List of 4-5 Errands 2   Taking Care of Complicated Tasks 2   Applied Cognition Raw Score 16   Applied Cognition Standardized Score 35 03     Documentation completed by: Rachid Martines MS, OTR/L

## 2022-12-02 NOTE — CASE MANAGEMENT
Case Management Discharge Planning Note    Patient name Venkatesh Arnold  Location [address] [address] [address]50 [facility]-* MRN [id number]  : 1960 Date 2022       Current Admission Date: 10/21/2022  Current Admission Diagnosis:PAD (peripheral artery disease) [facility])   Patient Active Problem List    Diagnosis Date Noted   • Diarrhea 2022   • CVA (cerebral vascular accident) ([address] ) 2022   • Febrile 2022   • HIT (heparin-induced thrombocytopenia) 2022   • Diabetic ulcer of heel associated with diabetes mellitus due to underlying condition ([address] ) 11/10/2022   • Acute on chronic anemia 10/30/2022   • Generalized edema due to fluid overload 10/27/2022   • PAD (peripheral artery disease) ([address] ) 10/25/2022   • Non healing left heel wound 10/22/2022   • Hypertensive urgency 10/22/2022   • Hypokalemia 10/22/2022   • Wound of lower extremity 10/21/2022   • Epigastric pain 2022   • Type 2 diabetes mellitus with hyperglycemia, with long-term current use of insulin ([address] ) 10/18/2021   • Hyperparathyroidism ([address] ) 10/18/2021   • 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 ([address] ) 2021   • 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   • Moderate protein-calorie malnutrition ([address] ) 2020   • Acute colitis 2020   • Arthritis 2019   • Depression, recurrent ([address] ) 2018   • Class 3 severe obesity due to excess calories with serious comorbidity and body mass index (BMI) of 40 0 to 44 9 in adult ([address] ) 2018   • Acute respiratory failure with hypoxia ([address] ) 2018   • Esophageal reflux 2018   • Uncontrolled type 2 diabetes with neuropathy 2018   • Diabetic peripheral neuropathy ([address] )    • Systolic murmur    • Stroke ([facility] ) 2015   • Anxiety 2015   • Vitamin D deficiency 2015   • Benign essential hypertension 2012   • Familial combined hyperlipidemia 2012      LOS (days): 41  Geometric Mean LOS (GMLOS) (days):   Days to GMLOS:     OBJECTIVE:  Risk of Unplanned Readmission Score: 32 01         Current admission status: Inpatient   Preferred Pharmacy:   [facility]/pharmacy #[id number] Alannahrizwana, [address] [place] [address] [address]  [address] [place] [address] [address]  [facility] [address]  Phone: [phone] Fax: [phone]    Primary Care Provider: ALEX Bell    Primary Insurance: BLUE CROSS  Secondary Insurance:     DISCHARGE DETAILS:    Additional Comments: Patient reviewed during care coordination rounds with Dr Paula Lopez who informed that pt had righ BKA 12/1 so now s/p bilateral BKAs  Pt will remain inpatient throughout the weekend  Plan for STR at time of discharge  CM department to follow

## 2022-12-02 NOTE — PLAN OF CARE
Problem: Potential for Falls  Goal: Patient will remain free of falls  Description: INTERVENTIONS:  - Educate patient/family on patient safety including physical limitations  - Instruct patient to call for assistance with activity   - Consult OT/PT to assist with strengthening/mobility   - Keep Call bell within reach  - Keep bed low and locked with side rails adjusted as appropriate  - Keep care items and personal belongings within reach  - Initiate and maintain comfort rounds  - Make Fall Risk Sign visible to staff  - Offer Toileting every 2 Hours, in advance of need  - Initiate/Maintain bed  chair alarm  - Obtain necessary fall risk management equipment: bed chair alarm, call bell, gripper sock, walker, bedside commode  - Apply yellow socks and bracelet for high fall risk patients  - Consider moving patient to room near nurses station  Outcome: Progressing     Problem: MOBILITY - ADULT  Goal: Maintain or return to baseline ADL function  Description: INTERVENTIONS:  -  Assess patient's ability to carry out ADLs; assess patient's baseline for ADL function and identify physical deficits which impact ability to perform ADLs (bathing, care of mouth/teeth, toileting, grooming, dressing, etc )  - Assess/evaluate cause of self-care deficits   - Assess range of motion  - Assess patient's mobility; develop plan if impaired  - Assess patient's need for assistive devices and provide as appropriate  - Encourage maximum independence but intervene and supervise when necessary  - Involve family in performance of ADLs  - Assess for home care needs following discharge   - Consider OT consult to assist with ADL evaluation and planning for discharge  - Provide patient education as appropriate  Outcome: Progressing  Goal: Maintains/Returns to pre admission functional level  Description: INTERVENTIONS:  - Perform BMAT or MOVE assessment daily    - Set and communicate daily mobility goal to care team and patient/family/caregiver     - Collaborate with rehabilitation services on mobility goals if consulted  - Assist patient in repositioning every 2 hours    - Dangle patient 3 times a day  - Stand patient 3 times a day  - Out of bed to chair as tolerated  - Out of bed for meals  - Out of bed for toileting  - Record patient progress and toleration of activity level   Outcome: Progressing

## 2022-12-02 NOTE — PLAN OF CARE
Problem: OCCUPATIONAL THERAPY ADULT  Goal: Performs self-care activities at highest level of function for planned discharge setting  See evaluation for individualized goals  Description: Treatment Interventions: ADL retraining, Functional transfer training, UE strengthening/ROM, Endurance training, Cognitive reorientation, Patient/family training, Equipment evaluation/education, Compensatory technique education, Energy conservation, Activityengagement          See flowsheet documentation for full assessment, interventions and recommendations  Note: Limitation: Decreased ADL status, Decreased UE strength, Decreased endurance, Decreased high-level ADLs (New L visual field deficits)  Prognosis: Good  Assessment: Pt seen for skilled OT session focused on ADLs, UE exercises  Pt is now s/p R AKA  Pt w/ increased pain in b/l residual limbs  Pt required encouragement to participate in session  Pt completed b/l UE exercises seen above AROM on R UE and AAROM L UE w/ end range stretching, pt w/ cues to maintain alertness t/o session  Pt w/ increased pain declined further activity despite encouragement  Pt continues to be limited due to decreased strength and endurance, impaired balance, impaired activity tolerance, increased pain, impaired cognition all causing a decline in ADLs, functional transfers and mobility  Recommend STR when medically stable  Will continue to follow to address OT POC  Due to pt increased pain and decreased activity tolerance will decrease POC to 2-3x/wk  The patient's raw score on the AM-PAC Daily Activity inpatient short form is 11, standardized score is 29 04, less than 39 4  Patients at this level are likely to benefit from discharge to post-acute rehabilitation services  Please refer to the recommendation of the Occupational Therapist for safe discharge planning       OT Discharge Recommendation: Post acute rehabilitation services

## 2022-12-02 NOTE — QUICK NOTE
Postoperative evaluation:    67-year-old female with history of left AKA, now status post right AKA  Pain seems well controlled on evaluation  Patient denies nausea vomiting  L AKA c/d/i  R   AKA with dressing in place    Plan: dressing down POD2, take photos at that time and likely will need plastics consult    Ermias Shields MD  7:47 PM  12/01/22

## 2022-12-02 NOTE — ASSESSMENT & PLAN NOTE
· Noted to have change in mental status on 11/13/2022  · CT confirmed moderate right parietal lobe stroke, likely embolic in the setting of HIT  · Ongoing argatroban infusion    · Defer choice of oral anticoagulation to primary service (warfarin versus NOAC)

## 2022-12-02 NOTE — ASSESSMENT & PLAN NOTE
· Initially admitted for nonhealing wound of the left lower extremity, requiring multiple procedures ultimately required left AKA on 11/23/2022 and right AKA on 12/1/22  · Ongoing Dilaudid PCA for pain   · Postoperative wound management per primary service  · Discussed with vascular surgery, given her multiple procedures and 2 AKAs, she was transferred to their service  · Jacinto catheter still in place due to postoperative status and to protect her stumps from wound contamination  · We will continue to follow as a consulting group

## 2022-12-02 NOTE — PROGRESS NOTES
Vascular Surgery  Progress Note   Victoria Monzon 65 y o  female MRN: [id number]  Unit/Bed#: E4 -01 Encounter: [id number]    Assessment:  65F with DM, HTN, HLD, h/o CVA presenting with bilateral CLTI (nonhealing L heel ulcer, R hallux and 5th digit ulceration underwent multiple endovascular interventions listed below followed by L AKA for non-salvageable foot  Hospital course complicated by atheroembolism with significant tissue loss and R hemispheric stroke and JUSTIN     10/31 R ALLISON BES, L SFA shockwave/FATOU   R CFA DCB/Atherectomy, SFA/pop FATOU, pop DCB  11/10 R ALLISON thrombectomy/BES, L SFA stent thrombectomy/SES, pop POBA   L AKA    R AKA    AFVSS  WBC 16 8 from 11 6   Hb 8 4 from 9 1, 2U PRBC intraop      Plan:  Continue pain control - exparel to sciatic nerve intraop, Tylenol 975mg cyndy QID, increase gabapentin 400mg TID, add robaxin 1000mg cyndy TID, continue dilaudid PCA  Plan for stump check tomorrow  Resume argatroban gtt   Gen diet  Continue Plavix and Lipitor  Remainder care per primary team  Vascular surgery following    Subjective/Objective     Subjective:   No acute events overnight  Pain along incision sites this morning bilateral stump sites, not fully controlled with PCA  No CP or SOB  Objective:     Vitals:Blood pressure 167/69, pulse 95, temperature 98 2 °F (36 8 °C), temperature source Temporal, resp  rate 20, height 5' 4" (1 626 m), weight 89 2 kg (196 lb 10 4 oz), SpO2 100 %, not currently breastfeeding  ,Body mass index is 33 76 kg/m²       Temp (24hrs), Av 4 °F (36 9 °C), Min:97 4 °F (36 3 °C), Max:99 8 °F (37 7 °C)  Current: Temperature: 98 2 °F (36 8 °C)      Intake/Output Summary (Last 24 hours) at 2022 0707  Last data filed at 2022 0601  Gross per 24 hour   Intake 1970 83 ml   Output 1350 ml   Net 620 83 ml       Invasive Devices     Peripherally Inserted Central Catheter Line  Duration           PICC Line  Right Basilic 13 days          Peripheral Intravenous Line  Duration           Peripheral IV 12/01/22 Left Arm <1 day          Drain  Duration           Urethral Catheter Non-latex 18 Fr   15 days              Physical Exam:    General: No acute distress  CV: Normal rate  Respiratory: Non-labored breathing  Abdominal: Soft  Extremities: Warm  Neurologic: Alert    Wound/Incision:  L AKA stump site with staples in place c/d/i  R AKA stump site with dressing in place no strikethrough    Pulse exam:  Nonpalpable R pedal pulses

## 2022-12-02 NOTE — PROGRESS NOTES
Patient was restarted on an Argatroban gtt this morning at 1035 with verification with another RN  The vial had run through in about 3 hours and was empty  The nurse immediately assessed the patient for bleeding, verified the dosing on the [facility] and then contacted pharmacy  At this point, we decided that vascular needed to be contacted for verification of the order  When vascular was contacted, person stated no longer in house and to "just restart it at what it was before " Pharmacy was again contacted to help with this  The patient's initial dose was 0 2 mcg/kg/min and was stopped at 0 02 mcg/kg/min and has since had a right side AKA  IR was consulted and deferred to vascular again for possible verification of appropriate dosing  IR tech stated order is questionable and to contact [address] [place] [address] for clarification  Order to stop gtt  Draw STAT PTT  If greater than 100 then hold x 2 hours and recheck PTT  After that PTT then restart at 0 2 mcg/kg/min

## 2022-12-02 NOTE — ASSESSMENT & PLAN NOTE
Monitor and transfuse as needed    Recent Labs     12/01/22  0519 12/01/22  1441 12/02/22  0556 12/02/22  1024   HGB 6 8* 9 1* 8 4* 8 7*     Status post 2 units PRBC yesterday for hemoglobin of 6 8  Monitor

## 2022-12-02 NOTE — ASSESSMENT & PLAN NOTE
Multifactorial secondary to atelectasis, hypoventilation and sedentary  Continue oxygen via nasal cannula  Try to wean off as tolerated

## 2022-12-02 NOTE — ASSESSMENT & PLAN NOTE
· Positive heparin antibody on 11/11/2020  · Platelet count has normalized while on argatroban infusion  · Back on argatroban post surgery  · Defer choice of oral anticoagulation to primary service (warfarin versus NOAC)

## 2022-12-02 NOTE — ASSESSMENT & PLAN NOTE
Lab Results   Component Value Date    HGBA1C 9 8 (H) 10/25/2022     Recent Labs     12/01/22  2103 12/02/22  0741 12/02/22  1147 12/02/22  1627   POCGLU 103 102 105 166*     Stable  Monitor postop    Watch for hypoglycemia

## 2022-12-02 NOTE — ASSESSMENT & PLAN NOTE
Replete  Recent Labs     11/30/22  1927 12/01/22  0519 12/02/22  0556   K 3 1* 3 4* 4 4     Resolved

## 2022-12-02 NOTE — PROGRESS NOTES
[address] [facility]  Progress Note - [facility] [facility] 1960, 65 y o  female MRN: [id number]  Unit/Bed#: E4 -01 Encounter: [id number]  Primary Care Provider: ALEX Morocho   Date and time admitted to hospital: 10/21/2022  7:58 PM    * PAD (peripheral artery disease) ([facility] [address] )  Assessment & Plan  · Initially admitted for nonhealing wound of the left lower extremity, requiring multiple procedures ultimately required left AKA on 11/23/2022 and right AKA on 12/1/22  · Ongoing Dilaudid PCA for pain   · Postoperative wound management per primary service  · Discussed with vascular surgery, given her multiple procedures and 2 AKAs, she was transferred to their service  · Jacinto catheter still in place due to postoperative status and to protect her stumps from wound contamination  · We will continue to follow as a consulting group    Stroke [facility])  Assessment & Plan  · Noted to have change in mental status on 11/13/2022  · CT confirmed moderate right parietal lobe stroke, likely embolic in the setting of HIT  · Ongoing argatroban infusion  · Defer choice of oral anticoagulation to primary service (warfarin versus NOAC)      HIT (heparin-induced thrombocytopenia)  Assessment & Plan  · Positive heparin antibody on 11/11/2020  · Platelet count has normalized while on argatroban infusion  · Back on argatroban post surgery  · Defer choice of oral anticoagulation to primary service (warfarin versus NOAC)    Type 2 diabetes mellitus with hyperglycemia, with long-term current use of insulin [facility])  Assessment & Plan  Lab Results   Component Value Date    HGBA1C 9 8 (H) 10/25/2022     Recent Labs     12/01/22  2103 12/02/22  0741 12/02/22  1147 12/02/22  1627   POCGLU 103 102 105 166*     Stable  Monitor postop    Watch for hypoglycemia    Acute on chronic anemia  Assessment & Plan  Monitor and transfuse as needed    Recent Labs     12/01/22  0519 12/01/22  1441 12/02/22  0556 12/02/22  1024   HGB 6 8* 9  1* 8 4* 8 7*     Status post 2 units PRBC yesterday for hemoglobin of 6 8  Monitor  Hypokalemia  Assessment & Plan  Replete  Recent Labs     22  1927 22  0519 22  0556   K 3 1* 3 4* 4 4     Resolved    Benign essential hypertension  Assessment & Plan  Continue Norvasc 10 mg daily and Cardura 2 mg daily    Depression, recurrent (HCC)  Assessment & Plan  Evaluated by Psychiatry on this admission  · Continue Zoloft 150 mg daily / Quetiapine 25mg po Qhs    Acute respiratory failure with hypoxia (HCC)  Assessment & Plan  Multifactorial secondary to atelectasis, hypoventilation and sedentary  Continue oxygen via nasal cannula  Try to wean off as tolerated    VTE Pharmacologic Prophylaxis:   Pharmacologic: Argatroban  Mechanical VTE Prophylaxis in Place: No    Discussions with Specialists or Other Care Team Provider:  Discussed with Mazin Jennings from vascular    Time Spent for Care: 20 minutes  More than 50% of total time spent on counseling and coordination of care as described above  Current Length of Stay: 41 day(s)    Current Patient Status: Inpatient   Certification Statement: The patient will continue to require additional inpatient hospital stay due to Postoperative state    Discharge Plan:  Likely rehab    Code Status: Level 1 - Full Code      Subjective:   + postoperative pain  No bleeding    Objective:     Vitals:   Temp (24hrs), Av 4 °F (36 9 °C), Min:97 8 °F (36 6 °C), Max:99 8 °F (37 7 °C)    Temp:  [97 8 °F (36 6 °C)-99 8 °F (37 7 °C)] 97 8 °F (36 6 °C)  HR:  [] 94  Resp:  [18-20] 18  BP: (129-167)/(60-92) 139/86  SpO2:  [93 %-100 %] 94 %  Body mass index is 33 76 kg/m²  Input and Output Summary (last 24 hours): Intake/Output Summary (Last 24 hours) at 2022 1725  Last data filed at 2022 0601  Gross per 24 hour   Intake --   Output 600 ml   Net -600 ml       Physical Exam:     Physical Exam  Constitutional:       Appearance: She is ill-appearing  HENT:      Head: Normocephalic and atraumatic  Nose: No congestion or rhinorrhea  Eyes:      General: No scleral icterus  Cardiovascular:      Rate and Rhythm: Normal rate and regular rhythm  Pulmonary:      Comments: Decreased at bases  Abdominal:      General: There is no distension  Palpations: Abdomen is soft  Tenderness: There is no abdominal tenderness  Musculoskeletal:      Comments: Bilateral AKA   Neurological:      Mental Status: She is alert  Comments: Awake alert follows commands   Psychiatric:         Mood and Affect: Mood normal          Behavior: Behavior normal        (    Additional Data:     Labs:    Results from last 7 days   Lab Units 12/02/22  1024 12/02/22  0556   WBC Thousand/uL  --  16 83*   HEMOGLOBIN g/dL 8 7* 8 4*   HEMATOCRIT % 27 4* 27 5*   PLATELETS Thousands/uL  --  372   NEUTROS PCT %  --  87*   LYMPHS PCT %  --  4*   MONOS PCT %  --  7   EOS PCT %  --  0     Results from last 7 days   Lab Units 12/02/22  1024 12/02/22  0556   SODIUM mmol/L  --  139   POTASSIUM mmol/L  --  4 4   CHLORIDE mmol/L  --  106   CO2 mmol/L  --  23   BUN mg/dL  --  9   CREATININE mg/dL  --  0 65   ANION GAP mmol/L  --  10   CALCIUM mg/dL  --  7 9*   ALBUMIN g/dL 1 0*  --    TOTAL BILIRUBIN mg/dL 0 43  --    ALK PHOS U/L 318*  --    ALT U/L 12  --    AST U/L 25  --    GLUCOSE RANDOM mg/dL  --  121     Results from last 7 days   Lab Units 12/02/22  1024   INR  2 61*     Results from last 7 days   Lab Units 12/02/22  1627 12/02/22  1147 12/02/22  0741 12/01/22  2103 12/01/22  1408 12/01/22  0816 11/30/22  2127 11/30/22  1601 11/30/22  1104 11/30/22  0730 11/29/22  2124 11/29/22  1626   POC GLUCOSE mg/dl 166* 105 102 103 106 103 108 103 82 91 96 91         Results from last 7 days   Lab Units 11/28/22  0532 11/27/22  0545   PROCALCITONIN ng/ml 0 18 0 17           * I Have Reviewed All Lab Data Listed Above  * Additional Pertinent Lab Tests Reviewed:  All Labs Within Last 24 Hours Reviewed    Imaging:    Imaging Reports Reviewed Today Include:  Operative report    Recent Cultures (last 7 days):           Last 24 Hours Medication List:   Current Facility-Administered Medications   Medication Dose Route Frequency Provider Last Rate   • acetaminophen  975 mg Oral Q6H [facility] Randolph Wilson MD     • ALPRAZolam  0 25 mg Oral Daily PRN Damaris Savage PA-C     • amLODIPine  10 mg Oral Daily Damaris Savage PA-C     • argatroban  0 1-3 mcg/kg/min Intravenous Titrated Randolph Wilson MD 2 mcg/kg/min (12/02/22 1035)   • atorvastatin  40 mg Oral Daily With Dinner Damaris Savage PA-C     • clopidogrel  75 mg Oral Daily Damaris Savage PA-C     • doxazosin  2 mg Oral Daily Damaris Savage PA-C     • gabapentin  400 mg Oral TID Randolph Wilson MD     • HYDROmorphone   Intravenous Continuous Damaris Savage PA-C     • insulin glargine  10 Units Subcutaneous HS Damaris Savage PA-C     • insulin lispro  1-6 Units Subcutaneous TID [facility] Damaris Savage PA-C     • methocarbamol  1,000 mg Oral [facility] Randolph Wilson MD     • metoclopramide  10 mg Intravenous Q6H PRN Damaris Savage PA-C     • naloxone  0 04 mg Intravenous Q1MIN PRN Damaris Savage PA-C     • nystatin   Topical BID Damaris Savage PA-C     • ondansetron  4 mg Intravenous Q6H PRN Damaris Savage PA-C     • pantoprazole  40 mg Oral Early Morning Damaris Savage PA-C     • polyethylene glycol  17 g Oral BID Damaris Savage PA-C     • potassium chloride  40 mEq Oral BID Damaris Savage PA-C     • QUEtiapine  25 mg Oral HS Damaris Savage PA-C     • senna  1 tablet Oral BID Damaris Savage PA-C     • sertraline  150 mg Oral Daily Damaris Savage PA-C     • sodium chloride  20 mL/hr Intravenous Continuous Damaris Savage PA-C Stopped (11/30/22 1823)        Today, Patient Was Seen By: Marcelo Darden MD    ** Please Note: Dictation voice to text software may have been used in the creation of this document   **

## 2022-12-03 LAB
APTT PPP: 112 SECONDS (ref 23–37)
APTT PPP: 144 SECONDS (ref 23–37)
APTT PPP: 148 SECONDS (ref 23–37)
APTT PPP: 197 SECONDS (ref 23–37)
APTT PPP: 91 SECONDS (ref 23–37)
ERYTHROCYTE [DISTWIDTH] IN BLOOD BY AUTOMATED COUNT: 16.9 % (ref 11.6–15.1)
GLUCOSE SERPL-MCNC: 118 MG/DL (ref 65–140)
GLUCOSE SERPL-MCNC: 122 MG/DL (ref 65–140)
GLUCOSE SERPL-MCNC: 148 MG/DL (ref 65–140)
GLUCOSE SERPL-MCNC: 165 MG/DL (ref 65–140)
HCT VFR BLD AUTO: 23 % (ref 34.8–46.1)
HGB BLD-MCNC: 7 G/DL (ref 11.5–15.4)
MCH RBC QN AUTO: 26.2 PG (ref 26.8–34.3)
MCHC RBC AUTO-ENTMCNC: 30.4 G/DL (ref 31.4–37.4)
MCV RBC AUTO: 86 FL (ref 82–98)
PLATELET # BLD AUTO: 404 THOUSANDS/UL (ref 149–390)
PMV BLD AUTO: 8.5 FL (ref 8.9–12.7)
RBC # BLD AUTO: 2.67 MILLION/UL (ref 3.81–5.12)
WBC # BLD AUTO: 17.81 THOUSAND/UL (ref 4.31–10.16)

## 2022-12-03 PROCEDURE — 82948 REAGENT STRIP/BLOOD GLUCOSE: CPT

## 2022-12-03 PROCEDURE — 85027 COMPLETE CBC AUTOMATED: CPT | Performed by: SURGERY

## 2022-12-03 PROCEDURE — 85730 THROMBOPLASTIN TIME PARTIAL: CPT | Performed by: SURGERY

## 2022-12-03 PROCEDURE — 85730 THROMBOPLASTIN TIME PARTIAL: CPT | Performed by: INTERNAL MEDICINE

## 2022-12-03 PROCEDURE — 99024 POSTOP FOLLOW-UP VISIT: CPT | Performed by: SURGERY

## 2022-12-03 PROCEDURE — 99232 SBSQ HOSP IP/OBS MODERATE 35: CPT | Performed by: INTERNAL MEDICINE

## 2022-12-03 RX ADMIN — AMLODIPINE BESYLATE 10 MG: 10 TABLET ORAL at 09:30

## 2022-12-03 RX ADMIN — DOXAZOSIN 2 MG: 2 TABLET ORAL at 09:31

## 2022-12-03 RX ADMIN — GABAPENTIN 400 MG: 400 CAPSULE ORAL at 22:11

## 2022-12-03 RX ADMIN — POTASSIUM CHLORIDE 40 MEQ: 1500 TABLET, EXTENDED RELEASE ORAL at 09:24

## 2022-12-03 RX ADMIN — INSULIN GLARGINE 10 UNITS: 100 INJECTION, SOLUTION SUBCUTANEOUS at 22:11

## 2022-12-03 RX ADMIN — METHOCARBAMOL 1000 MG: 500 TABLET ORAL at 05:12

## 2022-12-03 RX ADMIN — ACETAMINOPHEN 975 MG: 325 TABLET, FILM COATED ORAL at 13:52

## 2022-12-03 RX ADMIN — QUETIAPINE FUMARATE 25 MG: 25 TABLET ORAL at 22:11

## 2022-12-03 RX ADMIN — NYSTATIN: 100000 POWDER TOPICAL at 17:38

## 2022-12-03 RX ADMIN — METHOCARBAMOL 1000 MG: 500 TABLET ORAL at 13:52

## 2022-12-03 RX ADMIN — ACETAMINOPHEN 975 MG: 325 TABLET, FILM COATED ORAL at 05:12

## 2022-12-03 RX ADMIN — ATORVASTATIN CALCIUM 40 MG: 40 TABLET, FILM COATED ORAL at 17:37

## 2022-12-03 RX ADMIN — ACETAMINOPHEN 975 MG: 325 TABLET, FILM COATED ORAL at 17:36

## 2022-12-03 RX ADMIN — POTASSIUM CHLORIDE 40 MEQ: 1500 TABLET, EXTENDED RELEASE ORAL at 17:37

## 2022-12-03 RX ADMIN — ALPRAZOLAM 0.25 MG: 0.25 TABLET ORAL at 09:31

## 2022-12-03 RX ADMIN — GABAPENTIN 400 MG: 400 CAPSULE ORAL at 17:38

## 2022-12-03 RX ADMIN — CLOPIDOGREL BISULFATE 75 MG: 75 TABLET ORAL at 09:24

## 2022-12-03 RX ADMIN — SERTRALINE HYDROCHLORIDE 150 MG: 100 TABLET ORAL at 09:25

## 2022-12-03 RX ADMIN — PANTOPRAZOLE SODIUM 40 MG: 40 TABLET, DELAYED RELEASE ORAL at 05:12

## 2022-12-03 RX ADMIN — INSULIN LISPRO 1 UNITS: 100 INJECTION, SOLUTION INTRAVENOUS; SUBCUTANEOUS at 09:33

## 2022-12-03 RX ADMIN — GABAPENTIN 400 MG: 400 CAPSULE ORAL at 09:25

## 2022-12-03 RX ADMIN — METHOCARBAMOL 1000 MG: 500 TABLET ORAL at 22:11

## 2022-12-03 NOTE — ASSESSMENT & PLAN NOTE
Monitor and transfuse as needed    Recent Labs     12/01/22  1441 12/02/22  0556 12/02/22  1024 12/03/22  0518   HGB 9 1* 8 4* 8 7* 7 0*     Status post 2 units PRBC yesterday for hemoglobin of 6 8  Monitor

## 2022-12-03 NOTE — ASSESSMENT & PLAN NOTE
· Positive heparin antibody on 11/11/2020  · Platelet count has normalized while on argatroban infusion  · Ongoing argatroban infusion  · Possible transition to oral anticoagulation soon per primary service

## 2022-12-03 NOTE — PROGRESS NOTES
Vascular Surgery  Progress Note   Lilliana Trujillo 65 y o  female MRN: [id number]  Unit/Bed#: E4 -01 Encounter: [id number]    Assessment:  65F with DM, HTN, HLD, h/o CVA presenting with bilateral CLTI (nonhealing L heel ulcer, R hallux and 5th digit ulceration underwent multiple endovascular interventions listed below followed by L AKA for non-salvageable foot  Hospital course complicated by atheroembolism with significant tissue loss and R hemispheric stroke and JUSTIN     10/31 R ALLISON BES, L SFA shockwave/FATOU   R CFA DCB/Atherectomy, SFA/pop FATOU, pop DCB  11/10 R ALLISON thrombectomy/BES, L SFA stent thrombectomy/SES, pop POBA   L AKA    R AKA    Afebrile, VSS  WBC: 17 8 from 16 83   Hb 7  from 8     Plan:  Continue pain control - exparel to sciatic nerve intraop, Tylenol 975mg cyndy QID, increase gabapentin 400mg TID, add robaxin 1000mg cyndy TID, continue dilaudid PCA  Plan for stump check Monday  Resume argatroban gtt   Gen diet  Continue Plavix and Lipitor  F/u palliative care consult    Subjective/Objective     Subjective:   No acute events overnight  Pain not fully controlled  Dressing taken down  See pictures below  Objective:     Vitals:Blood pressure (!) 179/77, pulse 94, temperature 97 7 °F (36 5 °C), temperature source Temporal, resp  rate 18, height 5' 4" (1 626 m), weight 89 2 kg (196 lb 10 4 oz), SpO2 97 %, not currently breastfeeding  ,Body mass index is 33 76 kg/m²       Temp (24hrs), Av 5 °F (36 4 °C), Min:95 7 °F (35 4 °C), Max:98 2 °F (36 8 °C)  Current: Temperature: 97 7 °F (36 5 °C)      Intake/Output Summary (Last 24 hours) at 12/3/2022 1027  Last data filed at 12/3/2022 9907  Gross per 24 hour   Intake --   Output 900 ml   Net -900 ml       Invasive Devices     Peripherally Inserted Central Catheter Line  Duration           PICC Line  Right Basilic 14 days          Peripheral Intravenous Line  Duration           Peripheral IV 22 Left Arm 1 day          Drain Duration           Urethral Catheter Non-latex 18 Fr  16 days              Physical Exam  Vitals reviewed  Constitutional:       Appearance: She is not toxic-appearing  HENT:      Head: Normocephalic and atraumatic  Pulmonary:      Effort: Pulmonary effort is normal    Musculoskeletal:      Right Lower Extremity: Right leg is amputated above ankle  Left Lower Extremity: Left leg is amputated above ankle  Neurological:      Mental Status: She is alert and oriented to person, place, and time

## 2022-12-03 NOTE — PROGRESS NOTES
[address] [facility]  Progress Note - Rowdy Contreras 1960, 65 y o  female MRN: [id number]  Unit/Bed#: E4 -01 Encounter: [id number]  Primary Care Provider: ALEX Marie   Date and time admitted to hospital: 10/21/2022  7:58 PM    * PAD (peripheral artery disease) ([facility] [address] )  Assessment & Plan  · Initially admitted for nonhealing wound of the left lower extremity, requiring multiple procedures ultimately required left AKA on 11/23/2022 and right AKA on 12/1/22  · Ongoing Dilaudid PCA for pain   · Postoperative wound management per primary service    Stroke [facility])  Assessment & Plan  · Noted to have change in mental status on 11/13/2022  · CT confirmed moderate right parietal lobe stroke, likely embolic in the setting of HIT  · Ongoing argatroban infusion  · Possible transition to oral anticoagulation soon per primary service    HIT (heparin-induced thrombocytopenia)  Assessment & Plan  · Positive heparin antibody on 11/11/2020  · Platelet count has normalized while on argatroban infusion  · Ongoing argatroban infusion  · Possible transition to oral anticoagulation soon per primary service    Type 2 diabetes mellitus with hyperglycemia, with long-term current use of insulin [facility])  Assessment & Plan  Lab Results   Component Value Date    HGBA1C 9 8 (H) 10/25/2022     Recent Labs     12/02/22  2143 12/03/22  0720 12/03/22  1108 12/03/22  1621   POCGLU 151* 165* 122 118     Stable  Watch for hypoglycemia    Acute on chronic anemia  Assessment & Plan  Monitor and transfuse as needed    Recent Labs     12/01/22  1441 12/02/22  0556 12/02/22  1024 12/03/22  0518   HGB 9 1* 8 4* 8 7* 7 0*     Status post 2 units PRBC yesterday for hemoglobin of 6 8  Monitor      Benign essential hypertension  Assessment & Plan  Continue Norvasc 10 mg daily and Cardura 2 mg daily    Depression, recurrent (HCC)  Assessment & Plan  Evaluated by Psychiatry on this admission  · Continue Zoloft 150 mg daily / Quetiapine 25mg po Qhs    Acute respiratory failure with hypoxia (HCC)  Assessment & Plan  Multifactorial secondary to atelectasis, hypoventilation and sedentary  Continue oxygen via nasal cannula and wean as tolerated      VTE Pharmacologic Prophylaxis:   Pharmacologic: Argatroban  Mechanical VTE Prophylaxis in Place: No    Patient Centered Rounds: Discussed with her nurse    Time Spent for Care: 20 minutes  More than 50% of total time spent on counseling and coordination of care as described above  Current Length of Stay: 42 day(s)    Current Patient Status: Inpatient     Discharge Plan: Will need rehab placement    Code Status: Level 1 - Full Code    Subjective:   Main complaint today was postoperative pain and phantom pain  Objective:     Vitals:   Temp (24hrs), Av 5 °F (36 4 °C), Min:95 7 °F (35 4 °C), Max:98 7 °F (37 1 °C)    Temp:  [95 7 °F (35 4 °C)-98 7 °F (37 1 °C)] 98 7 °F (37 1 °C)  HR:  [83-95] 95  Resp:  [18-20] 18  BP: (105-179)/(54-77) 149/66  SpO2:  [90 %-99 %] 90 %  Body mass index is 33 76 kg/m²  Input and Output Summary (last 24 hours): Intake/Output Summary (Last 24 hours) at 12/3/2022 1642  Last data filed at 12/3/2022 1505  Gross per 24 hour   Intake 4 25 ml   Output 900 ml   Net -895 75 ml       Physical Exam:     Physical Exam  Constitutional:       Appearance: She is obese  HENT:      Head: Normocephalic and atraumatic  Nose: No congestion or rhinorrhea  Eyes:      General: No scleral icterus  Cardiovascular:      Rate and Rhythm: Normal rate and regular rhythm  Pulmonary:      Effort: No respiratory distress  Breath sounds: No wheezing or rales  Comments: Poor effort  Abdominal:      General: Abdomen is flat  Palpations: Abdomen is soft  Musculoskeletal:         General: Deformity present  Cervical back: Neck supple  Comments: Bilateral AKA   Neurological:      Mental Status: She is alert and oriented to person, place, and time  Psychiatric:         Mood and Affect: Mood normal          Behavior: Behavior normal      Additional Data:     Labs:    Results from last 7 days   Lab Units 12/03/22  0518 12/02/22  1024 12/02/22  0556   WBC Thousand/uL 17 81*  --  16 83*   HEMOGLOBIN g/dL 7 0*   < > 8 4*   HEMATOCRIT % 23 0*   < > 27 5*   PLATELETS Thousands/uL 404*  --  372   NEUTROS PCT %  --   --  87*   LYMPHS PCT %  --   --  4*   MONOS PCT %  --   --  7   EOS PCT %  --   --  0    < > = values in this interval not displayed  Results from last 7 days   Lab Units 12/02/22  1024 12/02/22  0556   SODIUM mmol/L  --  139   POTASSIUM mmol/L  --  4 4   CHLORIDE mmol/L  --  106   CO2 mmol/L  --  23   BUN mg/dL  --  9   CREATININE mg/dL  --  0 65   ANION GAP mmol/L  --  10   CALCIUM mg/dL  --  7 9*   ALBUMIN g/dL 1 0*  --    TOTAL BILIRUBIN mg/dL 0 43  --    ALK PHOS U/L 318*  --    ALT U/L 12  --    AST U/L 25  --    GLUCOSE RANDOM mg/dL  --  121     Results from last 7 days   Lab Units 12/02/22  1024   INR  2 61*     Results from last 7 days   Lab Units 12/03/22  1621 12/03/22  1108 12/03/22  0720 12/02/22  2143 12/02/22  1627 12/02/22  1147 12/02/22  0741 12/01/22  2103 12/01/22  1408 12/01/22  0816 11/30/22  2127 11/30/22  1601   POC GLUCOSE mg/dl 118 122 165* 151* 166* 105 102 103 106 103 108 103         Results from last 7 days   Lab Units 11/28/22  0532 11/27/22  0545   PROCALCITONIN ng/ml 0 18 0 17           * I Have Reviewed All Lab Data Listed Above  * Additional Pertinent Lab Tests Reviewed:  All Labs Within Last 24 Hours Reviewed    Imaging:    Imaging Reports Reviewed Today Include:  None    Recent Cultures (last 7 days):           Last 24 Hours Medication List:   Current Facility-Administered Medications   Medication Dose Route Frequency Provider Last Rate   • acetaminophen  975 mg Oral Q6H [facility] Chanel Rick MD     • ALPRAZolam  0 25 mg Oral Daily PRN Ronny Ham PA-C     • amLODIPine  10 mg Oral Daily Ronny Ham, KETURAH     • argatroban  0 1-3 mcg/kg/min Intravenous Titrated Arpit Landeros MD 0 1 mcg/kg/min (12/03/22 1034)   • atorvastatin  40 mg Oral Daily With Dinner Erik Khalil PA-C     • clopidogrel  75 mg Oral Daily Erik Khalil PA-C     • doxazosin  2 mg Oral Daily Erik Khalil PA-C     • gabapentin  400 mg Oral TID Arpit Landeros MD     • HYDROmorphone   Intravenous Continuous Erik Khalil PA-C     • insulin glargine  10 Units Subcutaneous HS Erik Khalil PA-C     • insulin lispro  1-6 Units Subcutaneous TID [facility] Erik Khalil PA-C     • methocarbamol  1,000 mg Oral [facility] Arpit Landeros MD     • metoclopramide  10 mg Intravenous Q6H PRN Erik Khalil PA-C     • naloxone  0 04 mg Intravenous Q1MIN PRN Erik Khalil PA-C     • nystatin   Topical BID Erik Khalil PA-C     • ondansetron  4 mg Intravenous Q6H PRN Erik Khalil PA-C     • pantoprazole  40 mg Oral Early Morning Erik Khalil PA-C     • polyethylene glycol  17 g Oral BID Erik Khalil PA-C     • potassium chloride  40 mEq Oral BID Erik Khalil PA-C     • QUEtiapine  25 mg Oral HS Erik Khalil PA-C     • senna  1 tablet Oral BID Erik Khalil PA-C     • sertraline  150 mg Oral Daily Erik Khalil PA-C     • sodium chloride  20 mL/hr Intravenous Continuous Erik Khalil PA-C 20 mL/hr (12/02/22 3991)        Today, Patient Was Seen By: Hema Russell MD    ** Please Note: Dictation voice to text software may have been used in the creation of this document   **

## 2022-12-03 NOTE — PLAN OF CARE
Problem: Potential for Falls  Goal: Patient will remain free of falls  Description: INTERVENTIONS:  - Educate patient/family on patient safety including physical limitations  - Instruct patient to call for assistance with activity   - Consult OT/PT to assist with strengthening/mobility   - Keep Call bell within reach  - Keep bed low and locked with side rails adjusted as appropriate  - Keep care items and personal belongings within reach  - Initiate and maintain comfort rounds  - Make Fall Risk Sign visible to staff  - Offer Toileting every 2 Hours, in advance of need  - Initiate/Maintain bed  chair alarm  - Obtain necessary fall risk management equipment: bed chair alarm, call bell, gripper sock, walker, bedside commode  - Apply yellow socks and bracelet for high fall risk patients  - Consider moving patient to room near nurses station  Outcome: Progressing     Problem: INFECTION - ADULT  Goal: Absence or prevention of progression during hospitalization  Description: INTERVENTIONS:  - Assess and monitor for signs and symptoms of infection  - Monitor lab/diagnostic results  - Monitor all insertion sites, i e  indwelling lines, tubes, and drains  - Administer medications as ordered  - Instruct and encourage patient and family to use good hand hygiene technique  Outcome: Progressing     Problem: SAFETY ADULT  Goal: Patient will remain free of falls  Description: INTERVENTIONS:  - Educate patient/family on patient safety including physical limitations  - Instruct patient to call for assistance with activity   - Consult OT/PT to assist with strengthening/mobility   - Keep Call bell within reach  - Keep bed low and locked with side rails adjusted as appropriate  - Keep care items and personal belongings within reach  - Initiate and maintain comfort rounds  - Make Fall Risk Sign visible to staff  - Offer Toileting every 2 Hours, in advance of need  - Initiate/Maintain bed  chair alarm  - Obtain necessary fall risk management equipment: bed chair alarm, call bell, gripper sock, walker, bedside commode  - Apply yellow socks and bracelet for high fall risk patients  - Consider moving patient to room near nurses station  Outcome: Progressing     Problem: DISCHARGE PLANNING  Goal: Discharge to home or other facility with appropriate resources  Description: INTERVENTIONS:  - Identify barriers to discharge w/patient   - Arrange for needed discharge resources and transportation as appropriate  - Identify discharge learning needs  - Refer to Case Management Department for coordinating discharge planning if the patient needs post-hospital services based on physician/advanced practitioner order   Outcome: Progressing     Problem: METABOLIC, FLUID AND ELECTROLYTES - ADULT  Goal: Electrolytes maintained within normal limits  Description: INTERVENTIONS:  - Monitor labs and assess patient for signs and symptoms of electrolyte imbalances  - Administer electrolyte replacement as ordered  - Monitor response to electrolyte replacements, including repeat lab results as appropriate  - Instruct patient on fluid and nutrition as appropriate  Outcome: Progressing  Goal: Fluid balance maintained  Description: INTERVENTIONS:  - Monitor labs   - Monitor I/O and WT  - Instruct patient on fluid and nutrition as appropriate  - Assess for signs & symptoms of volume excess or deficit  Outcome: Progressing  Goal: Glucose maintained within target range  Description: INTERVENTIONS:  - Monitor Blood Glucose as ordered  - Assess for signs and symptoms of hyperglycemia and hypoglycemia  - Administer ordered medications to maintain glucose within target range  - Assess nutritional intake and initiate nutrition service referral as needed  Outcome: Progressing     Problem: CARDIOVASCULAR - ADULT  Goal: Maintains optimal cardiac output and hemodynamic stability  Description: INTERVENTIONS:  - Monitor I/O, vital signs and rhythm  - Monitor for S/S and trends of decreased cardiac output  - Administer and titrate ordered vasoactive medications to optimize hemodynamic stability  - Assess quality of pulses, skin color and temperature  - Assess for signs of decreased coronary artery perfusion  - Instruct patient to report change in severity of symptoms  Outcome: Progressing     Problem: RESPIRATORY - ADULT  Goal: Achieves optimal ventilation and oxygenation  Description: INTERVENTIONS:  - Assess for changes in respiratory status  - Assess for changes in mentation and behavior  - Position to facilitate oxygenation and minimize respiratory effort  - Oxygen administered by appropriate delivery if ordered  - Initiate smoking cessation education as indicated  - Encourage broncho-pulmonary hygiene including cough, deep breathe, Incentive Spirometry  - Assess the need for suctioning and aspirate as needed  - Assess and instruct to report SOB or any respiratory difficulty  - Respiratory Therapy support as indicated  Outcome: Progressing     Problem: Nutrition/Hydration-ADULT  Goal: Nutrient/Hydration intake appropriate for improving, restoring or maintaining nutritional needs  Description: Monitor and assess patient's nutrition/hydration status for malnutrition  Collaborate with interdisciplinary team and initiate plan and interventions as ordered  Monitor patient's weight and dietary intake as ordered or per policy  Utilize nutrition screening tool and intervene as necessary  Determine patient's food preferences and provide high-protein, high-caloric foods as appropriate       INTERVENTIONS:  - Monitor oral intake, urinary output, labs, and treatment plans  - Assess nutrition and hydration status and recommend course of action  - Evaluate amount of meals eaten  - Assist patient with eating if necessary   - Allow adequate time for meals  - Recommend/ encourage appropriate diets, oral nutritional supplements, and vitamin/mineral supplements  - Order, calculate, and assess calorie counts as needed  - Recommend, monitor, and adjust tube feedings and TPN/PPN based on assessed needs  - Assess need for intravenous fluids  - Provide specific nutrition/hydration education as appropriate  - Include patient/family/caregiver in decisions related to nutrition  Outcome: Progressing     Problem: GENITOURINARY - ADULT  Goal: Maintains or returns to baseline urinary function  Description: INTERVENTIONS:  - Assess urinary function  - Encourage oral fluids to ensure adequate hydration if ordered  - Administer IV fluids as ordered to ensure adequate hydration  - Administer ordered medications as needed  - Offer frequent toileting  - Follow urinary retention protocol if ordered  Outcome: Progressing  Goal: Absence of urinary retention  Description: INTERVENTIONS:  - Assess patient’s ability to void and empty bladder  - Monitor I/O  - Bladder scan as needed  - Discuss with physician/AP medications to alleviate retention as needed  - Discuss catheterization for long term situations as appropriate  Outcome: Progressing  Goal: Urinary catheter remains patent  Description: INTERVENTIONS:  - Assess patency of urinary catheter  - If patient has a chronic pa, consider changing catheter if non-functioning  - Follow guidelines for intermittent irrigation of non-functioning urinary catheter  Outcome: Progressing

## 2022-12-03 NOTE — ASSESSMENT & PLAN NOTE
Lab Results   Component Value Date    HGBA1C 9 8 (H) 10/25/2022     Recent Labs     12/02/22  2143 12/03/22  0720 12/03/22  1108 12/03/22  1621   POCGLU 151* 165* 122 118     Stable    Watch for hypoglycemia

## 2022-12-03 NOTE — ASSESSMENT & PLAN NOTE
· Noted to have change in mental status on 11/13/2022  · CT confirmed moderate right parietal lobe stroke, likely embolic in the setting of HIT  · Ongoing argatroban infusion    · Possible transition to oral anticoagulation soon per primary service

## 2022-12-03 NOTE — ASSESSMENT & PLAN NOTE
Multifactorial secondary to atelectasis, hypoventilation and sedentary  Continue oxygen via nasal cannula and wean as tolerated

## 2022-12-04 LAB
ABO GROUP BLD BPU: NORMAL
ABO GROUP BLD BPU: NORMAL
ANION GAP SERPL CALCULATED.3IONS-SCNC: 9 MMOL/L (ref 4–13)
APTT PPP: 100 SECONDS (ref 23–37)
APTT PPP: 119 SECONDS (ref 23–37)
APTT PPP: 48 SECONDS (ref 23–37)
APTT PPP: 74 SECONDS (ref 23–37)
BPU ID: NORMAL
BPU ID: NORMAL
BUN SERPL-MCNC: 8 MG/DL (ref 5–25)
CALCIUM SERPL-MCNC: 7.6 MG/DL (ref 8.3–10.1)
CHLORIDE SERPL-SCNC: 105 MMOL/L (ref 96–108)
CO2 SERPL-SCNC: 26 MMOL/L (ref 21–32)
CREAT SERPL-MCNC: 0.67 MG/DL (ref 0.6–1.3)
CROSSMATCH: NORMAL
CROSSMATCH: NORMAL
ERYTHROCYTE [DISTWIDTH] IN BLOOD BY AUTOMATED COUNT: 16.6 % (ref 11.6–15.1)
GFR SERPL CREATININE-BSD FRML MDRD: 95 ML/MIN/1.73SQ M
GLUCOSE SERPL-MCNC: 100 MG/DL (ref 65–140)
GLUCOSE SERPL-MCNC: 105 MG/DL (ref 65–140)
GLUCOSE SERPL-MCNC: 109 MG/DL (ref 65–140)
GLUCOSE SERPL-MCNC: 116 MG/DL (ref 65–140)
GLUCOSE SERPL-MCNC: 130 MG/DL (ref 65–140)
HCT VFR BLD AUTO: 25.1 % (ref 34.8–46.1)
HGB BLD-MCNC: 7.5 G/DL (ref 11.5–15.4)
MCH RBC QN AUTO: 26.2 PG (ref 26.8–34.3)
MCHC RBC AUTO-ENTMCNC: 30.8 G/DL (ref 31.4–37.4)
MCV RBC AUTO: 88 FL (ref 82–98)
PLATELET # BLD AUTO: 404 THOUSANDS/UL (ref 149–390)
PMV BLD AUTO: 8.6 FL (ref 8.9–12.7)
POTASSIUM SERPL-SCNC: 4.5 MMOL/L (ref 3.5–5.3)
RBC # BLD AUTO: 2.86 MILLION/UL (ref 3.81–5.12)
SODIUM SERPL-SCNC: 140 MMOL/L (ref 135–147)
UNIT DISPENSE STATUS: NORMAL
UNIT DISPENSE STATUS: NORMAL
UNIT PRODUCT CODE: NORMAL
UNIT PRODUCT CODE: NORMAL
UNIT PRODUCT VOLUME: 350 ML
UNIT PRODUCT VOLUME: 350 ML
UNIT RH: NORMAL
UNIT RH: NORMAL
WBC # BLD AUTO: 13.58 THOUSAND/UL (ref 4.31–10.16)
WBC # BLD AUTO: 13.58 THOUSAND/UL (ref 4.31–10.16)

## 2022-12-04 PROCEDURE — 99232 SBSQ HOSP IP/OBS MODERATE 35: CPT | Performed by: INTERNAL MEDICINE

## 2022-12-04 PROCEDURE — 82948 REAGENT STRIP/BLOOD GLUCOSE: CPT

## 2022-12-04 PROCEDURE — 85730 THROMBOPLASTIN TIME PARTIAL: CPT | Performed by: SURGERY

## 2022-12-04 PROCEDURE — 85027 COMPLETE CBC AUTOMATED: CPT | Performed by: SURGERY

## 2022-12-04 PROCEDURE — 99024 POSTOP FOLLOW-UP VISIT: CPT | Performed by: SURGERY

## 2022-12-04 PROCEDURE — 85048 AUTOMATED LEUKOCYTE COUNT: CPT | Performed by: SURGERY

## 2022-12-04 PROCEDURE — 80048 BASIC METABOLIC PNL TOTAL CA: CPT | Performed by: SURGERY

## 2022-12-04 RX ORDER — OXYCODONE HYDROCHLORIDE 5 MG/1
5 TABLET ORAL EVERY 4 HOURS PRN
Status: DISCONTINUED | OUTPATIENT
Start: 2022-12-04 | End: 2022-12-04

## 2022-12-04 RX ORDER — HYDROMORPHONE HCL/PF 1 MG/ML
1 SYRINGE (ML) INJECTION
Status: DISCONTINUED | OUTPATIENT
Start: 2022-12-04 | End: 2022-12-05

## 2022-12-04 RX ORDER — OXYCODONE HYDROCHLORIDE 10 MG/1
10 TABLET ORAL EVERY 4 HOURS PRN
Status: DISCONTINUED | OUTPATIENT
Start: 2022-12-04 | End: 2022-12-04

## 2022-12-04 RX ORDER — OXYCODONE HYDROCHLORIDE 5 MG/1
5 TABLET ORAL
Status: DISCONTINUED | OUTPATIENT
Start: 2022-12-04 | End: 2022-12-07

## 2022-12-04 RX ORDER — HYDROMORPHONE HCL/PF 1 MG/ML
0.5 SYRINGE (ML) INJECTION
Status: DISCONTINUED | OUTPATIENT
Start: 2022-12-04 | End: 2022-12-04

## 2022-12-04 RX ORDER — OXYCODONE HYDROCHLORIDE 10 MG/1
10 TABLET ORAL
Status: DISCONTINUED | OUTPATIENT
Start: 2022-12-04 | End: 2022-12-07

## 2022-12-04 RX ADMIN — OXYCODONE HYDROCHLORIDE 15 MG: 10 TABLET ORAL at 17:27

## 2022-12-04 RX ADMIN — QUETIAPINE FUMARATE 25 MG: 25 TABLET ORAL at 21:37

## 2022-12-04 RX ADMIN — INSULIN GLARGINE 10 UNITS: 100 INJECTION, SOLUTION SUBCUTANEOUS at 21:38

## 2022-12-04 RX ADMIN — RIVAROXABAN 15 MG: 15 TABLET, FILM COATED ORAL at 17:26

## 2022-12-04 RX ADMIN — METHOCARBAMOL 1000 MG: 500 TABLET ORAL at 05:59

## 2022-12-04 RX ADMIN — ACETAMINOPHEN 975 MG: 325 TABLET, FILM COATED ORAL at 00:59

## 2022-12-04 RX ADMIN — METHOCARBAMOL 1000 MG: 500 TABLET ORAL at 21:37

## 2022-12-04 RX ADMIN — ACETAMINOPHEN 975 MG: 325 TABLET, FILM COATED ORAL at 05:59

## 2022-12-04 RX ADMIN — METHOCARBAMOL 1000 MG: 500 TABLET ORAL at 13:37

## 2022-12-04 RX ADMIN — AMLODIPINE BESYLATE 10 MG: 10 TABLET ORAL at 08:42

## 2022-12-04 RX ADMIN — NYSTATIN: 100000 POWDER TOPICAL at 17:26

## 2022-12-04 RX ADMIN — POTASSIUM CHLORIDE 40 MEQ: 1500 TABLET, EXTENDED RELEASE ORAL at 17:26

## 2022-12-04 RX ADMIN — GABAPENTIN 400 MG: 400 CAPSULE ORAL at 08:55

## 2022-12-04 RX ADMIN — ACETAMINOPHEN 975 MG: 325 TABLET, FILM COATED ORAL at 17:26

## 2022-12-04 RX ADMIN — PANTOPRAZOLE SODIUM 40 MG: 40 TABLET, DELAYED RELEASE ORAL at 05:59

## 2022-12-04 RX ADMIN — GABAPENTIN 400 MG: 400 CAPSULE ORAL at 17:28

## 2022-12-04 RX ADMIN — SENNOSIDES 8.6 MG: 8.6 TABLET, FILM COATED ORAL at 08:42

## 2022-12-04 RX ADMIN — POTASSIUM CHLORIDE 40 MEQ: 1500 TABLET, EXTENDED RELEASE ORAL at 08:54

## 2022-12-04 RX ADMIN — ACETAMINOPHEN 975 MG: 325 TABLET, FILM COATED ORAL at 23:25

## 2022-12-04 RX ADMIN — SERTRALINE HYDROCHLORIDE 150 MG: 100 TABLET ORAL at 08:55

## 2022-12-04 RX ADMIN — ACETAMINOPHEN 975 MG: 325 TABLET, FILM COATED ORAL at 12:33

## 2022-12-04 RX ADMIN — DOXAZOSIN 2 MG: 2 TABLET ORAL at 08:55

## 2022-12-04 RX ADMIN — NYSTATIN: 100000 POWDER TOPICAL at 09:07

## 2022-12-04 RX ADMIN — CLOPIDOGREL BISULFATE 75 MG: 75 TABLET ORAL at 08:42

## 2022-12-04 RX ADMIN — POLYETHYLENE GLYCOL 3350 17 G: 17 POWDER, FOR SOLUTION ORAL at 21:39

## 2022-12-04 RX ADMIN — HYDROMORPHONE HYDROCHLORIDE: 10 INJECTION, SOLUTION INTRAMUSCULAR; INTRAVENOUS; SUBCUTANEOUS at 12:41

## 2022-12-04 RX ADMIN — ATORVASTATIN CALCIUM 40 MG: 40 TABLET, FILM COATED ORAL at 17:27

## 2022-12-04 RX ADMIN — GABAPENTIN 400 MG: 400 CAPSULE ORAL at 21:32

## 2022-12-04 NOTE — ASSESSMENT & PLAN NOTE
Lab Results   Component Value Date    HGBA1C 9 8 (H) 10/25/2022     Recent Labs     12/03/22  1621 12/03/22  2109 12/04/22  0720 12/04/22  1208   POCGLU 118 148* 130 100     Stable    Watch for hypoglycemia

## 2022-12-04 NOTE — ASSESSMENT & PLAN NOTE
· Initially admitted for nonhealing wound of the left lower extremity, requiring multiple procedures ultimately required left AKA on 11/23/2022 and right AKA on 12/1/22  · Postoperative wound and pain management per primary service  · Dilaudid PCA switch to oral regimen today  Discussed with surgery Dr Breann Alvarez      · Eventual short-term rehab placement

## 2022-12-04 NOTE — PROGRESS NOTES
Vascular Surgery  Progress Note   [address] 65 y o  female MRN: [id number]  Unit/Bed#: E4 -01 Encounter: [id number]    Assessment:  65F with DM, HTN, HLD, h/o CVA presenting with bilateral CLTI (nonhealing L heel ulcer, R hallux and 5th digit ulceration underwent multiple endovascular interventions listed below followed by L AKA for non-salvageable foot  Hospital course complicated by atheroembolism with significant tissue loss and R hemispheric stroke and JUSTIN     10/31 R ALLISON BES, L SFA shockwave/FATOU   R CFA DCB/Atherectomy, SFA/pop FATOU, pop DCB  11/10 R ALLISON thrombectomy/BES, L SFA stent thrombectomy/SES, pop POBA   L AKA    R AKA    Afebrile, VSS      Plan:  Continue pain control -  continue dilaudid PCA  Plan for stump check Monday  Resume argatroban gtt-f/u CM for DOAC pricing  Gen diet  Continue Plavix and Lipitor  Dc pa  F/u palliative care consult    Subjective/Objective     Subjective:   No acute events overnight  Objective:     Vitals:Blood pressure 149/66, pulse 95, temperature 98 7 °F (37 1 °C), temperature source Temporal, resp  rate 18, height 5' 4" (1 626 m), weight 89 2 kg (196 lb 10 4 oz), SpO2 90 %, not currently breastfeeding  ,Body mass index is 33 76 kg/m²  Temp (24hrs), Av 5 °F (36 4 °C), Min:95 7 °F (35 4 °C), Max:98 7 °F (37 1 °C)  Current: Temperature: 98 7 °F (37 1 °C)      Intake/Output Summary (Last 24 hours) at 12/3/2022 2050  Last data filed at 12/3/2022 1701  Gross per 24 hour   Intake 4 25 ml   Output 1025 ml   Net -1020 75 ml       Invasive Devices     Peripherally Inserted Central Catheter Line  Duration           PICC Line  Right Basilic 15 days          Drain  Duration           Urethral Catheter Non-latex 18 Fr  17 days              Physical Exam  Vitals reviewed  Constitutional:       General: She is not in acute distress  Appearance: She is not toxic-appearing  HENT:      Head: Normocephalic and atraumatic     Cardiovascular: Rate and Rhythm: Normal rate  Pulmonary:      Effort: Pulmonary effort is normal    Musculoskeletal:      Right Lower Extremity: Right leg is amputated above knee  Left Lower Extremity: Left leg is amputated above knee

## 2022-12-04 NOTE — PROGRESS NOTES
Patient maintained regular assessment, no change in her status  Taking medication regimen and in no acute distress

## 2022-12-04 NOTE — PROGRESS NOTES
[address] [facility]  Progress Note - [address] [facility] [address] 1960, 65 y o  female MRN: [id number]  Unit/Bed#: E4 -01 Encounter: [id number]  Primary Care Provider: ALEX Thomas   Date and time admitted to hospital: 10/21/2022  7:58 PM    * PAD (peripheral artery disease) ([facility] [address] )  Assessment & Plan  · Initially admitted for nonhealing wound of the left lower extremity, requiring multiple procedures ultimately required left AKA on 11/23/2022 and right AKA on 12/1/22  · Postoperative wound and pain management per primary service  · Dilaudid PCA switch to oral regimen today  Discussed with surgery Dr Dinah Tafoya  · Eventual short-term rehab placement    Stroke [facility])  Assessment & Plan  · Noted to have change in mental status on 11/13/2022  · CT confirmed moderate right parietal lobe stroke, likely embolic in the setting of HIT  · Treated with argatroban infusion, now on Xarelto per primary service    HIT (heparin-induced thrombocytopenia)  Assessment & Plan  · Positive heparin antibody on 11/11/2020  · Platelet count has normalized while on argatroban infusion  · Argatroban switched to Xarelto 15 mg b i d  today per primary service    Type 2 diabetes mellitus with hyperglycemia, with long-term current use of insulin [facility])  Assessment & Plan  Lab Results   Component Value Date    HGBA1C 9 8 (H) 10/25/2022     Recent Labs     12/03/22  1621 12/03/22  2109 12/04/22  0720 12/04/22  1208   POCGLU 118 148* 130 100     Stable  Watch for hypoglycemia    Benign essential hypertension  Assessment & Plan  Continue Norvasc 10 mg daily and Cardura 2 mg daily    Depression, recurrent (HCC)  Assessment & Plan  Evaluated by Psychiatry on this admission  · Continue Zoloft 150 mg daily / Quetiapine 25mg po Qhs    [facility]  Assessment & Plan  Multifactorial secondary to atelectasis, hypoventilation and sedentary  Resolved    Off oxygen today      VTE Pharmacologic Prophylaxis:   Pharmacologic: Rivaroxaban (Xarelto)  Mechanical VTE Prophylaxis in Place: No    Discussions with Specialists or Other Care Team Provider:  Discussed with primary team Dr Alicia Dickens who was managing her wound and pain medications  He switched her to oral pain regimen  Education and Discussions with Family / Patient: spoke with Melinda Bailon and gave update    Time Spent for Care: 20 minutes  More than 50% of total time spent on counseling and coordination of care as described above  Current Length of Stay: 43 day(s)    Current Patient Status: Inpatient     Certification Statement: The patient will continue to require additional inpatient hospital stay due to Status post bilateral AKA    Discharge Plan:  Short-term rehab placement    Code Status: Level 1 - Full Code    Subjective:   Seen and examined during rounds  Surgeon Dr Alicia Dickens was there also  Still with pain  Description sounded neuropathic around the area of her stumps  They occur with certain movements  She was off oxygen when I saw her    Objective:     Vitals:   Temp (24hrs), Av 9 °F (37 2 °C), Min:97 9 °F (36 6 °C), Max:99 7 °F (37 6 °C)    Temp:  [97 9 °F (36 6 °C)-99 7 °F (37 6 °C)] 99 7 °F (37 6 °C)  HR:  [52-95] 52  Resp:  [18-19] 19  BP: (138-208)/(66-84) 208/84  SpO2:  [91 %-96 %] 93 %  Body mass index is 33 76 kg/m²  Input and Output Summary (last 24 hours): Intake/Output Summary (Last 24 hours) at 2022 1659  Last data filed at 2022 0601  Gross per 24 hour   Intake --   Output 1300 ml   Net -1300 ml       Physical Exam:     Physical Exam  Constitutional:       Appearance: She is obese  She is not ill-appearing  HENT:      Head: Normocephalic and atraumatic  Mouth/Throat:      Pharynx: No oropharyngeal exudate or posterior oropharyngeal erythema  Eyes:      General: No scleral icterus  Cardiovascular:      Rate and Rhythm: Normal rate and regular rhythm     Pulmonary:      Effort: No respiratory distress  Breath sounds: No stridor  No wheezing, rhonchi or rales  Abdominal:      General: There is no distension  Palpations: Abdomen is soft  Tenderness: There is no abdominal tenderness  Musculoskeletal:         General: Deformity present  Cervical back: Neck supple  Comments: Bilateral AKA   Neurological:      Mental Status: She is alert and oriented to person, place, and time  Psychiatric:         Mood and Affect: Mood normal          Behavior: Behavior normal          Additional Data:     Labs:    Results from last 7 days   Lab Units 12/04/22  0907 12/02/22  1024 12/02/22  0556   WBC Thousand/uL 13 58*  13 58*   < > 16 83*   HEMOGLOBIN g/dL 7 5*   < > 8 4*   HEMATOCRIT % 25 1*   < > 27 5*   PLATELETS Thousands/uL 404*   < > 372   NEUTROS PCT %  --   --  87*   LYMPHS PCT %  --   --  4*   MONOS PCT %  --   --  7   EOS PCT %  --   --  0    < > = values in this interval not displayed  Results from last 7 days   Lab Units 12/04/22  0907 12/02/22  1024   SODIUM mmol/L 140  --    POTASSIUM mmol/L 4 5  --    CHLORIDE mmol/L 105  --    CO2 mmol/L 26  --    BUN mg/dL 8  --    CREATININE mg/dL 0 67  --    ANION GAP mmol/L 9  --    CALCIUM mg/dL 7 6*  --    ALBUMIN g/dL  --  1 0*   TOTAL BILIRUBIN mg/dL  --  0 43   ALK PHOS U/L  --  318*   ALT U/L  --  12   AST U/L  --  25   GLUCOSE RANDOM mg/dL 116  --      Results from last 7 days   Lab Units 12/02/22  1024   INR  2 61*     Results from last 7 days   Lab Units 12/04/22  1208 12/04/22  0720 12/03/22  2109 12/03/22  1621 12/03/22  1108 12/03/22  0720 12/02/22  2143 12/02/22  1627 12/02/22  1147 12/02/22  0741 12/01/22  2103 12/01/22  1408   POC GLUCOSE mg/dl 100 130 148* 118 122 165* 151* 166* 105 102 103 106         Results from last 7 days   Lab Units 11/28/22  0532   PROCALCITONIN ng/ml 0 18           * I Have Reviewed All Lab Data Listed Above  * Additional Pertinent Lab Tests Reviewed:  All Labs Within Last 24 Hours Reviewed    Imaging:    Imaging Reports Reviewed Today Include:  None    Recent Cultures (last 7 days):           Last 24 Hours Medication List:   Current Facility-Administered Medications   Medication Dose Route Frequency Provider Last Rate   • acetaminophen  975 mg Oral Q6H MICHELLE Randolph Wilson MD     • ALPRAZolam  0 25 mg Oral Daily PRN Damaris Savage PA-C     • amLODIPine  10 mg Oral Daily Damaris Savage PA-C     • atorvastatin  40 mg Oral Daily With [address]     • clopidogrel  75 mg Oral Daily Damaris Savaeg PA-C     • doxazosin  2 mg Oral Daily Damaris Savage PA-C     • gabapentin  400 mg Oral TID Randolph Wilson MD     • HYDROmorphone  1 mg Intravenous Q3H PRN Hugh Jamison MD     • insulin glargine  10 Units Subcutaneous HS Damaris Savage PA-C     • insulin lispro  1-6 Units Subcutaneous TID [facility] Damaris Savage PA-C     • methocarbamol  1,000 mg Oral [facility] Randolph Wilson MD     • metoclopramide  10 mg Intravenous Q6H PRN Damaris Savage PA-C     • naloxone  0 04 mg Intravenous Q1MIN PRN Damaris Savage PA-C     • nystatin   Topical BID Damaris Savage PA-C     • ondansetron  4 mg Intravenous Q6H PRN Damaris Savage PA-C     • oxyCODONE  5 mg Oral Q3H PRN Hugh Jamison MD      Or   • oxyCODONE  10 mg Oral Q3H PRN Hugh Jamison MD      Or   • oxyCODONE  15 mg Oral Q3H PRN Hugh Jamison MD     • pantoprazole  40 mg Oral Early Morning Damaris Savage PA-C     • polyethylene glycol  17 g Oral BID Damaris Savage PA-C     • potassium chloride  40 mEq Oral BID Damaris Savage PA-C     • QUEtiapine  25 mg Oral HS Damaris Savage PA-C     • rivaroxaban  15 mg Oral BID With Meals Hugh Jamison MD     • senna  1 tablet Oral BID Damaris Savage PA-C     • sertraline  150 mg Oral Daily Damaris Savage PA-C          Today, Patient Was Seen By: Marcelo Darden MD    ** Please Note: Dictation voice to text software may have been used in the creation of this document   **

## 2022-12-04 NOTE — ASSESSMENT & PLAN NOTE
Multifactorial secondary to atelectasis, hypoventilation and sedentary  Resolved    Off oxygen today

## 2022-12-04 NOTE — ASSESSMENT & PLAN NOTE
· Noted to have change in mental status on 11/13/2022    · CT confirmed moderate right parietal lobe stroke, likely embolic in the setting of HIT  · Treated with argatroban infusion, now on Xarelto per primary service

## 2022-12-04 NOTE — ASSESSMENT & PLAN NOTE
· Positive heparin antibody on 11/11/2020  · Platelet count has normalized while on argatroban infusion  · Argatroban switched to Xarelto 15 mg b i d  today per primary service

## 2022-12-04 NOTE — CASE MANAGEMENT
Case Management Progress Note    Patient name Ang Jeffery  Location [address] [facility] 4 /E4 [facility] MRN [id number]  : 1960 Date 2022       LOS (days): 43  Geometric Mean LOS (GMLOS) (days):   Days to GMLOS:        OBJECTIVE:        Current admission status: Inpatient  [facility]erred Pharmacy:   [id number], [address] [place] [address] [address]  [address] [place] [address]  [address] [address]  Phone: [phone] Fax: [phone]    Primary Care Provider: ALEX Ervin    Primary Insurance: BLUE CROSS  Secondary Insurance:     PROGRESS NOTE:    As directed by vascular, CM called Pts Pharmacy to price check Eliquis v Xarelto  Eliquis on order, delivery to pharmacy expected tomorrow   Pharmacist will run againt Pts insurance for monthly OOP cost at that time  Xarelto priced between $40 and $50 00 a month  CM will update provider and present to Pt with a discount for first month free  If Pts insurance participates in the discount programs, Pt will be eligible for the $10 monthly co pay discount instead  IF PATIENT DISCHARGES TO HOME:  CM will continue to follow for final choice of medication and oop cost to patient  CURRENT DISCHARGE PLAN: PT IS EXPECTED TO DISCHARGE TO A REHAB FACILITY-MEDICATIONS WILL BE COVERED BY HER IN-PATIENT REHABILITATION BENEFIT

## 2022-12-05 LAB
ANION GAP SERPL CALCULATED.3IONS-SCNC: 9 MMOL/L (ref 4–13)
APTT PPP: 152 SECONDS (ref 23–37)
APTT PPP: 182 SECONDS (ref 23–37)
APTT PPP: 200 SECONDS (ref 23–37)
APTT PPP: >210 SECONDS (ref 23–37)
BASOPHILS # BLD AUTO: 0.03 THOUSANDS/ÂΜL (ref 0–0.1)
BASOPHILS NFR BLD AUTO: 0 % (ref 0–1)
BUN SERPL-MCNC: 9 MG/DL (ref 5–25)
CALCIUM SERPL-MCNC: 8.3 MG/DL (ref 8.3–10.1)
CHLORIDE SERPL-SCNC: 106 MMOL/L (ref 96–108)
CO2 SERPL-SCNC: 26 MMOL/L (ref 21–32)
CREAT SERPL-MCNC: 0.72 MG/DL (ref 0.6–1.3)
EOSINOPHIL # BLD AUTO: 0.28 THOUSAND/ÂΜL (ref 0–0.61)
EOSINOPHIL NFR BLD AUTO: 2 % (ref 0–6)
ERYTHROCYTE [DISTWIDTH] IN BLOOD BY AUTOMATED COUNT: 16.5 % (ref 11.6–15.1)
GFR SERPL CREATININE-BSD FRML MDRD: 90 ML/MIN/1.73SQ M
GLUCOSE SERPL-MCNC: 114 MG/DL (ref 65–140)
GLUCOSE SERPL-MCNC: 115 MG/DL (ref 65–140)
GLUCOSE SERPL-MCNC: 93 MG/DL (ref 65–140)
GLUCOSE SERPL-MCNC: 94 MG/DL (ref 65–140)
GLUCOSE SERPL-MCNC: 99 MG/DL (ref 65–140)
HCT VFR BLD AUTO: 27.8 % (ref 34.8–46.1)
HGB BLD-MCNC: 8.5 G/DL (ref 11.5–15.4)
IMM GRANULOCYTES # BLD AUTO: 0.19 THOUSAND/UL (ref 0–0.2)
IMM GRANULOCYTES NFR BLD AUTO: 1 % (ref 0–2)
LYMPHOCYTES # BLD AUTO: 0.8 THOUSANDS/ÂΜL (ref 0.6–4.47)
LYMPHOCYTES NFR BLD AUTO: 6 % (ref 14–44)
MCH RBC QN AUTO: 26.1 PG (ref 26.8–34.3)
MCHC RBC AUTO-ENTMCNC: 30.6 G/DL (ref 31.4–37.4)
MCV RBC AUTO: 85 FL (ref 82–98)
MONOCYTES # BLD AUTO: 0.84 THOUSAND/ÂΜL (ref 0.17–1.22)
MONOCYTES NFR BLD AUTO: 6 % (ref 4–12)
NEUTROPHILS # BLD AUTO: 11.13 THOUSANDS/ÂΜL (ref 1.85–7.62)
NEUTS SEG NFR BLD AUTO: 85 % (ref 43–75)
NRBC BLD AUTO-RTO: 0 /100 WBCS
PLATELET # BLD AUTO: 447 THOUSANDS/UL (ref 149–390)
PMV BLD AUTO: 8.3 FL (ref 8.9–12.7)
POTASSIUM SERPL-SCNC: 4.7 MMOL/L (ref 3.5–5.3)
PROTHROMBIN TIME: >120 SECONDS (ref 11.6–14.5)
PROTHROMBIN TIME: >120 SECONDS (ref 11.6–14.5)
RBC # BLD AUTO: 3.26 MILLION/UL (ref 3.81–5.12)
SODIUM SERPL-SCNC: 141 MMOL/L (ref 135–147)
WBC # BLD AUTO: 13.27 THOUSAND/UL (ref 4.31–10.16)

## 2022-12-05 PROCEDURE — 82948 REAGENT STRIP/BLOOD GLUCOSE: CPT

## 2022-12-05 PROCEDURE — 85730 THROMBOPLASTIN TIME PARTIAL: CPT | Performed by: INTERNAL MEDICINE

## 2022-12-05 PROCEDURE — 99232 SBSQ HOSP IP/OBS MODERATE 35: CPT | Performed by: INTERNAL MEDICINE

## 2022-12-05 PROCEDURE — 80048 BASIC METABOLIC PNL TOTAL CA: CPT | Performed by: INTERNAL MEDICINE

## 2022-12-05 PROCEDURE — 85730 THROMBOPLASTIN TIME PARTIAL: CPT | Performed by: PHYSICIAN ASSISTANT

## 2022-12-05 PROCEDURE — 99024 POSTOP FOLLOW-UP VISIT: CPT | Performed by: SURGERY

## 2022-12-05 PROCEDURE — 85610 PROTHROMBIN TIME: CPT | Performed by: INTERNAL MEDICINE

## 2022-12-05 PROCEDURE — 85025 COMPLETE CBC W/AUTO DIFF WBC: CPT | Performed by: INTERNAL MEDICINE

## 2022-12-05 PROCEDURE — 85610 PROTHROMBIN TIME: CPT | Performed by: PHYSICIAN ASSISTANT

## 2022-12-05 PROCEDURE — 99254 IP/OBS CNSLTJ NEW/EST MOD 60: CPT | Performed by: INTERNAL MEDICINE

## 2022-12-05 RX ORDER — HYDROMORPHONE HCL IN WATER/PF 6 MG/30 ML
0.2 PATIENT CONTROLLED ANALGESIA SYRINGE INTRAVENOUS
Status: DISCONTINUED | OUTPATIENT
Start: 2022-12-05 | End: 2022-12-07

## 2022-12-05 RX ORDER — WARFARIN SODIUM 5 MG/1
5 TABLET ORAL
Status: DISCONTINUED | OUTPATIENT
Start: 2022-12-05 | End: 2022-12-05 | Stop reason: ALTCHOICE

## 2022-12-05 RX ORDER — ARGATROBAN 1 MG/ML
.1-3 INJECTION INTRAVENOUS
Status: DISCONTINUED | OUTPATIENT
Start: 2022-12-05 | End: 2022-12-10

## 2022-12-05 RX ADMIN — POTASSIUM CHLORIDE 40 MEQ: 1500 TABLET, EXTENDED RELEASE ORAL at 17:21

## 2022-12-05 RX ADMIN — METHOCARBAMOL 1000 MG: 500 TABLET ORAL at 05:52

## 2022-12-05 RX ADMIN — NYSTATIN: 100000 POWDER TOPICAL at 08:57

## 2022-12-05 RX ADMIN — CLOPIDOGREL BISULFATE 75 MG: 75 TABLET ORAL at 08:50

## 2022-12-05 RX ADMIN — METHOCARBAMOL 1000 MG: 500 TABLET ORAL at 22:22

## 2022-12-05 RX ADMIN — QUETIAPINE FUMARATE 25 MG: 25 TABLET ORAL at 22:22

## 2022-12-05 RX ADMIN — NYSTATIN: 100000 POWDER TOPICAL at 17:24

## 2022-12-05 RX ADMIN — GABAPENTIN 400 MG: 400 CAPSULE ORAL at 16:39

## 2022-12-05 RX ADMIN — OXYCODONE HYDROCHLORIDE 15 MG: 10 TABLET ORAL at 16:36

## 2022-12-05 RX ADMIN — ACETAMINOPHEN 975 MG: 325 TABLET, FILM COATED ORAL at 23:06

## 2022-12-05 RX ADMIN — PANTOPRAZOLE SODIUM 40 MG: 40 TABLET, DELAYED RELEASE ORAL at 05:52

## 2022-12-05 RX ADMIN — RIVAROXABAN 15 MG: 15 TABLET, FILM COATED ORAL at 09:03

## 2022-12-05 RX ADMIN — POTASSIUM CHLORIDE 40 MEQ: 1500 TABLET, EXTENDED RELEASE ORAL at 08:50

## 2022-12-05 RX ADMIN — OXYCODONE HYDROCHLORIDE 15 MG: 10 TABLET ORAL at 08:52

## 2022-12-05 RX ADMIN — ALPRAZOLAM 0.25 MG: 0.25 TABLET ORAL at 08:50

## 2022-12-05 RX ADMIN — OXYCODONE HYDROCHLORIDE 15 MG: 10 TABLET ORAL at 20:04

## 2022-12-05 RX ADMIN — GABAPENTIN 400 MG: 400 CAPSULE ORAL at 08:51

## 2022-12-05 RX ADMIN — OXYCODONE HYDROCHLORIDE 15 MG: 10 TABLET ORAL at 12:47

## 2022-12-05 RX ADMIN — ATORVASTATIN CALCIUM 40 MG: 40 TABLET, FILM COATED ORAL at 16:39

## 2022-12-05 RX ADMIN — ACETAMINOPHEN 975 MG: 325 TABLET, FILM COATED ORAL at 17:20

## 2022-12-05 RX ADMIN — ACETAMINOPHEN 975 MG: 325 TABLET, FILM COATED ORAL at 12:46

## 2022-12-05 RX ADMIN — GABAPENTIN 400 MG: 400 CAPSULE ORAL at 20:04

## 2022-12-05 RX ADMIN — ACETAMINOPHEN 975 MG: 325 TABLET, FILM COATED ORAL at 05:51

## 2022-12-05 RX ADMIN — SERTRALINE HYDROCHLORIDE 150 MG: 100 TABLET ORAL at 08:50

## 2022-12-05 RX ADMIN — INSULIN GLARGINE 10 UNITS: 100 INJECTION, SOLUTION SUBCUTANEOUS at 22:22

## 2022-12-05 RX ADMIN — ARGATROBAN 2 MCG/KG/MIN: 50 INJECTION, SOLUTION INTRAVENOUS at 14:40

## 2022-12-05 RX ADMIN — METHOCARBAMOL 1000 MG: 500 TABLET ORAL at 12:46

## 2022-12-05 NOTE — ASSESSMENT & PLAN NOTE
65year old female former smoker w/HTN, HLD, uncontrolled type II DM (A1c 9 8), hx CVA, presenting with nonhealing extensive L heel ulceration and R hallux and 5th digit ulceration  Vascular surgery consulted for input  S/p multiple b/l endovascular interventions  JUSTIN  R hemispheric CVA    S/p L AKA 11/23/22 [facility])  S/p R AKA, wound debridement 12/1/22 ([place])    Hgb 8 5  WBC 13 27    Recommendations:  - Bilateral tissue loss in the setting of uncontrolled type II DM and NYDIA on admission, now s/p multiple angiograms w/intervention    - Endovascular interventions, c/b atheroembolic event to the RLE and JUSTIN with R hemispheric CVA  - Now s/p L AKA on 11/23/22 and R AKA 12/1/22 w/ proximal thigh wound debridements  - Dressing changed over the weekend, pictures in the chart  Will consult wound care and plastic surgery for recommendations regarding proximal thigh wounds   - Patient was switched to Xarelto over the weekend from argatroban gtt  Unfortunately, d/w heme/onc who is recommending that patient be placed on coumadin only given confirmed diagnosis of HIT  Therefore, will d/c Xarelto, restart argatroban gtt and initiate coumadin therapy  Goal INR 2-3  D/w nursing   - Medical management with statin, Plavix  - Medical management and glucose control per SLIM   Continue to trend Hgb  - Continue oral pain regimen   - D/w heme/onc, SLIM, and nursing  - Will d/w Dr Kelly Ross

## 2022-12-05 NOTE — ASSESSMENT & PLAN NOTE
Evaluated by Psychiatry on this admission  · Continue Zoloft 150 mg daily / Quetiapine 25mg po Qhs  · Palliative care recommends psychiatry evaluation How Severe Is Your Skin Lesion?: mild Has Your Skin Lesion Been Treated?: not been treated Is This A New Presentation, Or A Follow-Up?: Skin Lesion

## 2022-12-05 NOTE — ASSESSMENT & PLAN NOTE
· Initially admitted for nonhealing wound of the left lower extremity, requiring multiple procedures ultimately required left AKA on 11/23/2022 and right AKA on 12/1/22  · Postoperative wound and pain management per primary service  · Eventual short-term rehab placement

## 2022-12-05 NOTE — ASSESSMENT & PLAN NOTE
Lab Results   Component Value Date    HGBA1C 9 8 (H) 10/25/2022     Recent Labs     12/04/22  1656 12/04/22  2114 12/05/22  0735 12/05/22  1119   POCGLU 105 109 99 115     Significantly reduced regimen from home  Continue Lantus 10 units q h s   Plus sliding scale insulin  Continue to titrate

## 2022-12-05 NOTE — PROGRESS NOTES
[facility] [facility]  [facility] 1960, 65 [facility]  female MRN: [id number]  Unit/Bed#: E4 -01 Encounter: [id number]  Primary Care Provider: ALEX Chaudhary   Date and time admitted to hospital: 10/21/2022  7:58 PM    * PAD (peripheral artery disease) [facility])  Assessment & Plan  65year old female former smoker w/HTN, HLD, uncontrolled type II DM (A1c 9 8), hx CVA, presenting with nonhealing extensive L heel ulceration and R hallux and 5th digit ulceration  Vascular surgery consulted for input  S/p multiple b/l endovascular interventions  JUSTIN  R hemispheric CVA    S/p L AKA 11/23/22 [facility])  S/p R AKA, wound debridement 12/1/22 ([facility])    Hgb 8 5  WBC 13 27    Recommendations:  - Bilateral tissue loss in the setting of uncontrolled type II DM and NYDIA on admission, now s/p multiple angiograms w/intervention    - Endovascular interventions, c/b atheroembolic event to the RLE and JUSTIN with R hemispheric CVA  - Now s/p L AKA on 11/23/22 and R AKA 12/1/22 w/ proximal thigh wound debridements  - Dressing changed over the weekend, pictures in the chart  Will consult wound care and plastic surgery for recommendations regarding proximal thigh wounds   - Patient was switched to Xarelto over the weekend from argatroban gtt  Unfortunately, d/w heme/onc who is recommending that patient be placed on coumadin only given confirmed diagnosis of HIT  Therefore, will d/c Xarelto, restart argatroban gtt and initiate coumadin therapy  Goal INR 2-3  D/w nursing   - Medical management with statin, Plavix  - Medical management and glucose control per SLIM  Continue to trend Hgb  - Continue oral pain regimen   - D/w heme/onc, SLIM, and nursing  - Will d/w Dr Gabrielle Dominguez      Subjective: Patient resting comfortably in bed  Dressings in place  Pain is better controlled       Vitals:  /69 (BP Location: Left arm)   Pulse 88   Temp 97 6 °F (36 4 °C) (Temporal)   Resp 18   Ht 5' 4" (1 626 m)   Wt 85 9 kg (189 lb 6 oz)   SpO2 92%   BMI 32 51 kg/m²     I/Os:  I/O last 3 completed shifts:  In: -   Out: 700 [Urine:700]  No intake/output data recorded  Lab Results and Cultures:   CBC with diff: Lab Results   Component Value Date    WBC 13 27 (H) 12/05/2022    HGB 8 5 (L) 12/05/2022    HCT 27 8 (L) 12/05/2022    MCV 85 12/05/2022     (H) 12/05/2022    MCH 26 1 (L) 12/05/2022    MCHC 30 6 (L) 12/05/2022    RDW 16 5 (H) 12/05/2022    MPV 8 3 (L) 12/05/2022    NRBC 0 12/05/2022   ,   BMP/CMP:  Lab Results   Component Value Date    SODIUM 141 12/05/2022    K 4 7 12/05/2022     12/05/2022    CO2 26 12/05/2022    BUN 9 12/05/2022    CREATININE 0 72 12/05/2022    CALCIUM 8 3 12/05/2022    AST 25 12/02/2022    ALT 12 12/02/2022    ALKPHOS 318 (H) 12/02/2022    EGFR 90 12/05/2022   ,   Lipid Panel: No results found for: CHOL,   Coags:   Lab Results   Component Value Date     (H) 12/04/2022    INR 2 61 (H) 12/02/2022   ,     Blood Culture:   Lab Results   Component Value Date    BLOODCX No Growth After 5 Days   11/13/2022    BLOODCX Staphylococcus coagulase negative (A) 11/13/2022   ,   Urinalysis: Lab Results   Component Value Date    COLORU Yellow 11/12/2022    CLARITYU Slightly Cloudy 11/12/2022    SPECGRAV 1 025 11/12/2022    PHUR 5 5 11/12/2022    PHUR 6 5 02/23/2018    LEUKOCYTESUR Trace (A) 11/12/2022    NITRITE Positive (A) 11/12/2022    GLUCOSEU Negative 11/12/2022    KETONESU Negative 11/12/2022    BILIRUBINUR Negative 11/12/2022    BLOODU Large (A) 11/12/2022   ,   Urine Culture:   Lab Results   Component Value Date    URINECX >100,000 cfu/ml Enterobacter cloacae complex (A) 11/12/2022    URINECX 70,000-79,000 cfu/ml Kluyveromyces marxianus (A) 11/12/2022   ,   Wound Culure: No results found for: WOUNDCULT    Medications:  Current Facility-Administered Medications   Medication Dose Route Frequency   • acetaminophen (TYLENOL) tablet 975 mg  975 mg Oral Q6H [address]   • ALPRAZolam Hanna Betters) tablet 0 25 mg  0 25 mg Oral Daily PRN   • amLODIPine (NORVASC) tablet 10 mg  10 mg Oral Daily   • argatroban infusion (premix)  0 1-3 mcg/kg/min Intravenous Titrated   • atorvastatin (LIPITOR) tablet 40 mg  40 mg Oral Daily With Dinner   • clopidogrel (PLAVIX) tablet 75 mg  75 mg Oral Daily   • doxazosin (CARDURA) tablet 2 mg  2 mg Oral Daily   • gabapentin (NEURONTIN) capsule 400 mg  400 mg Oral TID   • HYDROmorphone (DILAUDID) injection 1 mg  1 mg Intravenous Q3H PRN   • insulin glargine (LANTUS) subcutaneous injection 10 Units 0 1 mL  10 Units Subcutaneous HS   • insulin lispro (HumaLOG) 100 units/mL subcutaneous injection 1-6 Units  1-6 Units Subcutaneous TID AC   • methocarbamol (ROBAXIN) tablet 1,000 mg  1,000 mg Oral Q8H [address]   • metoclopramide (REGLAN) injection 10 mg  10 mg Intravenous Q6H PRN   • naloxone (NARCAN) 0 04 mg/mL syringe 0 04 mg  0 04 mg Intravenous Q1MIN PRN   • nystatin (MYCOSTATIN) powder   Topical BID   • ondansetron (ZOFRAN) injection 4 mg  4 mg Intravenous Q6H PRN   • oxyCODONE (ROXICODONE) IR tablet 5 mg  5 mg Oral Q3H PRN    Or   • oxyCODONE (ROXICODONE) immediate release tablet 10 mg  10 mg Oral Q3H PRN    Or   • oxyCODONE (ROXICODONE) IR tablet 15 mg  15 mg Oral Q3H PRN   • pantoprazole (PROTONIX) EC tablet 40 mg  40 mg Oral Early Morning   • polyethylene glycol (MIRALAX) packet 17 g  17 g Oral BID   • potassium chloride (K-DUR,KLOR-CON) CR tablet 40 mEq  40 mEq Oral BID   • QUEtiapine (SEROquel) tablet 25 mg  25 mg Oral HS   • senna (SENOKOT) tablet 8 6 mg  1 tablet Oral BID   • sertraline (ZOLOFT) tablet 150 mg  150 mg Oral Daily   • warfarin (COUMADIN) tablet 5 mg  5 mg Oral Daily (warfarin)       Imaging:  Reviewed  Physical Exam:    General: Alert and oriented  Resting comfortably     Extremities: S/p bilateral lower extremity AKA        Yohannes Montes PA-C  12/5/2022

## 2022-12-05 NOTE — PROGRESS NOTES
[address] [facility]  [facility] 1960, 65 y o  female MRN: [id number]  Unit/Bed#: E4 -01 Encounter: [id number]  Primary Care Provider: ALEX Blackwood   Date and time admitted to hospital: 10/21/2022  7:58 PM    * PAD (peripheral artery disease) ([facility] [address] )  Assessment & Plan  · Initially admitted for nonhealing wound of the left lower extremity, requiring multiple procedures ultimately required left AKA on 11/23/2022 and right AKA on 12/1/22  · Postoperative wound and pain management per primary service  · Eventual short-term rehab placement    HIT (heparin-induced thrombocytopenia)  Assessment & Plan  · Positive heparin antibody on 11/11/2020  · Appreciate heme Onc recommendations  · Argatroban to Coumadin bridge    Acute on chronic anemia  Assessment & Plan  Hemoglobin stable    Hypokalemia  Assessment & Plan  Replete  Recent Labs     12/04/22  0907 12/05/22  0546   K 4 5 4 7     Resolved    Type 2 diabetes mellitus with hyperglycemia, with long-term current use of insulin [facility])  Assessment & Plan  Lab Results   Component Value Date    HGBA1C 9 8 (H) 10/25/2022     Recent Labs     12/04/22  1656 12/04/22  2114 12/05/22  0735 12/05/22  1119   POCGLU 105 109 99 115     Significantly reduced regimen from home  Continue Lantus 10 units q h s  Plus sliding scale insulin  Continue to titrate      Stroke [facility])  Assessment & Plan  · Noted to have change in mental status on 11/13/2022  · CT confirmed moderate right parietal lobe stroke, likely embolic in the setting of HIT  · Heme Onc recommends Coumadin    Currently on argatroban bridge to Coumadin  · Trend INR goal 2-3    Benign essential hypertension  Assessment & Plan  BP stable  Continue Norvasc 10 mg daily and Cardura 2 mg daily  Home lisinopril, metoprolol on hold    Depression, recurrent (HCC)  Assessment & Plan  Evaluated by Psychiatry on this admission  · Continue Zoloft 150 mg daily / Quetiapine 25mg po Qhs  · Palliative care recommends psychiatry evaluation    Acute respiratory failure with hypoxia [facility])  Assessment & Plan  Multifactorial secondary to atelectasis, hypoventilation and sedentary  Resolved  VTE Pharmacologic Prophylaxis:  Argatroban, Coumadin    Patient Centered Rounds:  Patient care rounds were performed with nursing    Time Spent for Care: 30  More than 50% of total time spent on counseling and coordination of care as described above  Current Length of Stay: 44 day(s)    Current Patient Status: Inpatient   Certification Statement: The patient will continue to require additional inpatient hospital stay due to postoperative care, placement    Discharge Plan:  Per primary team    Code Status: Level 1 - Full Code      Subjective:   Patient seen and evaluated at bedside  She reports significant pain whenever her lower extremities are moved  Some intermittent phantom like symptoms  Objective:     Vitals:   Temp (24hrs), Av 6 °F (36 4 °C), Min:97 6 °F (36 4 °C), Max:97 6 °F (36 4 °C)    Temp:  [97 6 °F (36 4 °C)] 97 6 °F (36 4 °C)  HR:  [88-89] 88  Resp:  [18] 18  BP: (116-140)/(69-86) 116/69  SpO2:  [92 %-93 %] 92 %  Body mass index is 32 51 kg/m²  Input and Output Summary (last 24 hours):     No intake or output data in the 24 hours ending 22 1522    Physical Exam:     Physical Exam  Vitals reviewed  Constitutional:       General: She is not in acute distress  Appearance: She is well-developed  She is not ill-appearing, toxic-appearing or diaphoretic  HENT:      Head: Normocephalic and atraumatic  Mouth/Throat:      Mouth: Mucous membranes are moist    Eyes:      General: No scleral icterus  Extraocular Movements: Extraocular movements intact  Cardiovascular:      Rate and Rhythm: Normal rate and regular rhythm  Heart sounds: Normal heart sounds  Pulmonary:      Effort: Pulmonary effort is normal  No respiratory distress        Breath sounds: Normal breath sounds  No wheezing or rales  Abdominal:      General: There is no distension  Palpations: Abdomen is soft  Tenderness: There is no abdominal tenderness  There is no guarding or rebound  Musculoskeletal:      Comments: Bilateral AKA   Skin:     General: Skin is warm and dry  Neurological:      Mental Status: She is alert  Psychiatric:      Comments: Depressed affect         Additional Data:     Labs: I have reviewed pertinent results     Results from last 7 days   Lab Units 12/05/22  0546   WBC Thousand/uL 13 27*   HEMOGLOBIN g/dL 8 5*   HEMATOCRIT % 27 8*   PLATELETS Thousands/uL 447*   NEUTROS PCT % 85*   LYMPHS PCT % 6*   MONOS PCT % 6   EOS PCT % 2     Results from last 7 days   Lab Units 12/05/22  0546 12/04/22  0907 12/02/22  1024   SODIUM mmol/L 141   < >  --    POTASSIUM mmol/L 4 7   < >  --    CHLORIDE mmol/L 106   < >  --    CO2 mmol/L 26   < >  --    BUN mg/dL 9   < >  --    CREATININE mg/dL 0 72   < >  --    ANION GAP mmol/L 9   < >  --    CALCIUM mg/dL 8 3   < >  --    ALBUMIN g/dL  --   --  1 0*   TOTAL BILIRUBIN mg/dL  --   --  0 43   ALK PHOS U/L  --   --  318*   ALT U/L  --   --  12   AST U/L  --   --  25   GLUCOSE RANDOM mg/dL 114   < >  --     < > = values in this interval not displayed       Results from last 7 days   Lab Units 12/02/22  1024   INR  2 61*     Results from last 7 days   Lab Units 12/05/22  1119 12/05/22  0735 12/04/22  2114 12/04/22  1656 12/04/22  1208 12/04/22  0720 12/03/22  2109 12/03/22  1621 12/03/22  1108 12/03/22  0720 12/02/22  2143 12/02/22  1627   POC GLUCOSE mg/dl 115 99 109 105 100 130 148* 118 122 165* 151* 166*                 Imaging: I have reviewed pertinent imaging       Recent Cultures (last 7 days):           Last 24 Hours Medication List:   Current Facility-Administered Medications   Medication Dose Route Frequency Provider Last Rate   • acetaminophen  975 mg Oral Q6H [facility] Karo Morocho MD     • ALPRAZolam  0 25 mg Oral Daily PRN Melinda Bailon NAT RomeroC     • amLODIPine  10 mg Oral Daily Jeff Miranda PA-C     • argatroban  0 1-3 mcg/kg/min Intravenous Titrated Jeff Miranda PA-C 2 mcg/kg/min (12/05/22 1440)   • atorvastatin  40 mg Oral Daily With Radha Valero PA-C     • clopidogrel  75 mg Oral Daily NAT SummersC     • doxazosin  2 mg Oral Daily Jeff Miranda PA-C     • gabapentin  400 mg Oral TID Theresa Zaragoza MD     • HYDROmorphone  0 2 mg Intravenous Q3H PRN Theresa Zaragoza MD     • insulin glargine  10 Units Subcutaneous HS NAT SummersC     • insulin lispro  1-6 Units Subcutaneous TID [facility] Jeff Miranda PA-C     • methocarbamol  1,000 mg Oral [facility] Theresa Zaragoza MD     • metoclopramide  10 mg Intravenous Q6H PRN Jeff Miranda PA-C     • naloxone  0 04 mg Intravenous Q1MIN PRN Jeff Miranda PA-C     • nystatin   Topical BID Jeff Miranda PA-C     • ondansetron  4 mg Intravenous Q6H PRN Jeff Miranda PA-C     • oxyCODONE  5 mg Oral Q3H PRN Roxi Jamison MD      Or   • oxyCODONE  10 mg Oral Q3H PRN Roxi Jamison MD      Or   • oxyCODONE  15 mg Oral Q3H PRN Roxi Jamison MD     • pantoprazole  40 mg Oral Early Morning NAT SummersC     • polyethylene glycol  17 g Oral BID Jeff Miranda PA-C     • potassium chloride  40 mEq Oral BID Jeff Miranda PA-C     • QUEtiapine  25 mg Oral HS Jeff Miranda PA-C     • senna  1 tablet Oral BID Jeff Miranda PA-C     • sertraline  150 mg Oral Daily Jeff Miranda PA-C     • warfarin  5 mg Oral Daily (warfarin) Jeff Miranda PA-C          Today, Patient Was Seen By: Nikkie Washington DO    ** Please Note: Dictation voice to text software may have been used in the creation of this document   **

## 2022-12-05 NOTE — CONSULTS
Consultation - Palliative & Supportive Care   Mayela Varghese  65 y o   female  Gunner 4 /E4 MS 26-*   MRN: [id number]  Encounter: [id number]    ASSESSMENT:    Patient Active Problem List   Diagnosis   • Esophageal reflux   • Uncontrolled type 2 diabetes with neuropathy   • Class 3 severe obesity due to excess calories with serious comorbidity and body mass index (BMI) of 40 0 to 44 9 in adult [facility])   • Acute respiratory failure with hypoxia ([facility])   • Depression, recurrent ([facility])   • Anxiety   • Benign essential hypertension   • Stroke [facility])   • Diabetic peripheral neuropathy ([facility])   • Vitamin D deficiency   • Systolic murmur   • Familial combined hyperlipidemia   • Arthritis   • Acute colitis   • Moderate protein-calorie malnutrition ([facility])   • 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   • 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 ([facility])   • Type 2 diabetes mellitus with hyperglycemia, with long-term current use of insulin ([facility])   • Hyperparathyroidism ([facility] [address] )   • Epigastric pain   • Wound of lower extremity   • Non healing left heel wound   • Hypertensive urgency   • Hypokalemia   • PAD (peripheral artery disease) ([facility])   • Generalized edema due to fluid overload   • Acute on chronic anemia   • Diabetic ulcer of heel associated with diabetes mellitus due to underlying condition ([facility] [address] )   • HIT (heparin-induced thrombocytopenia)   • CVA (cerebral vascular accident) ([facility] [address] )   • Febrile   • Diarrhea       Active problems addressed:  · PAD  · S/p bilateral AKA  · DM  · Depression  · Goals of care    Consult is for goals of care    PLAN:    1  Goals:   • Patient appears to have some capacity to make medical choices on her own, but I wonder if her depression is affecting her overalll demeanor  • At this time, she wishes to remain a full code   As per future plans, she said she and her daughter have not made any plans yet (re: living arrangements) and is awaiting to see her progress   • I'd recommend a re-evaluation with psychiatry re: worsening depression  And perhaps neuropsych afterwards for capacity eval      Code status: Level 1 - Full Code   Decisional apparatus:  Patient does have some capacity to make medical decisions on my exam today  If such capacity is lost, patient's substitute decision maker would default to daughter by [place]  Advance Directive / Living Will / POLST:  POA paperwork reviewed in chart (12/5)    2  Social Support:  • Supportive listening provided  • Time spent providing emotional support  Patient is understandably sad about losing both limbs  • Prior to this stay, patient was living alone but receives support from her daughter who lives locally  She also has a sister who lives in [place], [place]  • Patient is not , she only has one child - daughter/Jailyn who works as a teacher     3  Symptom management:  • Palliative does not manage post op pain  Symptoms per primary team  • Ongoing depression that per patient is getting worse  Consider re-evaluation by behavioral health, last seen 11/10 (Dr Dougie Downs via telemed)  We appreciate the opportunity to participate in this patient's care  We will continue to follow  Please do not hesitate to contact our on-call provider through our clinic answering service at [phone] should you have acute symptom control concerns  IDENTIFICATION:  Inpatient consult to Palliative Care  Consult performed by: Luke Genao MD  Consult ordered by: Silvana Caceres MD        Reason for Consult / Principal Problem:     HISTORY OF PRESENT ILLNESS:    [address] Nadeem is a 65 y o  female with DM, PAD, who was admitted 44 days ago initially for nonhealing wound of the LLE  She eventually underwent L AKA (11/23) and R AKA (12/1) since  Hospital course complicated by stroke in 11/13, HIT, and acute hypsotix respiratory failure that got improved after receiving appropriate supportive cares   Per latest note on 12/04, "per RN input, patient is not participating in her care and make comments about not wanting to continue care; needs goals of care discussions ongoing", therefore, palliative is consulted  Met with patient and introduced palliative care  Patient appears comfortable in bed, though complaining of worse pain in both LEs after surgery  She has her breakfast tray set up and ready for her, but she has not touched this yet  She has a flat affect  She was able to tell me the reason for her admission - that her legs had chronic nonhealing wounds because she did not take care of her DM  When asked why she didn't take care of her DM, she was not able to provide an answer  She said her sister and her daughter helped her make decisions re: amputation  She said she is depressed when her legs were removed  She feels that her depression is getting worse, but denies any suicidal ideation  She was living alone prior to this hospital stay and when asked what her plans are moving forward, with me pointing out that she likely will need more care now that her legs were amputated, she said she and her daughter have not made plans yet  They are waiting to see her progress and decide appropriately  When asked about her code status, she said she'd like to received resuscitation if needed  Interview and exam limited by: none    Review of Systems   Constitutional: Negative for activity change, appetite change and fatigue  HENT: Negative for trouble swallowing  Respiratory: Negative for shortness of breath  Cardiovascular: Negative for chest pain  Gastrointestinal: Negative for abdominal pain  Musculoskeletal:        Leg pain, worse per patient's report   Neurological: Positive for weakness  Psychiatric/Behavioral: Positive for dysphoric mood  Negative for suicidal ideas  All other systems reviewed and are negative        Past Medical History:   Diagnosis Date   • Anxiety    • Depression    • Diabetes ([facility] )    • Diabetes mellitus ([facility] )    • Esophageal reflux     2015 RESOLVED   • Hyperlipidemia    • Hypertension    • Low back pain     sciatica   • Pneumonia 2019   • Stroke ([facility] [address] ) 2015    small vessel, L frontal corona radiata  Rx asa 81, Lipitor 40, risk modification   • Vitamin D deficiency      Past Surgical History:   Procedure Laterality Date   • AMPUTATION ABOVE KNEE (AKA) Right 2022    Procedure: (AKA), PSB  BKA;  Surgeon: Poornima Richards DO;  Location: [facility];  Service: Vascular   • ARTERIOGRAM Right 2022    Procedure: -Right lower extremity angiogram -Right CFA balloon angioplasty with [id number]  [employer]  -Right CFA DCB with 6x40 Bard Lutonix -Right CFA atherectomy with Jetstream and distal embolization protection with 7mm Spider FX -Right SFA/Pop angioplasty with 4x40 Chololate balloon -Right SFA/Pop DCB with 5x150 Bard Lutonix -Right SFA stent with [employer] x2 -R   • COLONOSCOPY     • IR AORTAGRAM WITH RUN-OFF  10/31/2022   • IR LOWER EXTREMITY ANGIOGRAM  11/10/2022   • IR LOWER EXTREMITY ANGIOGRAM  2022   • NE AMPUTATE THIGH,THRU FEMUR Left 2022    Procedure: (AKA);   Surgeon: Isamar Arnold DO;  Location: [facility];  Service: Vascular     Social History     Socioeconomic History   • Marital status: Single     Spouse name: Not on file   • Number of children: Not on file   • Years of education: Not on file   • Highest education level: Not on file   Occupational History   • Not on file   Tobacco Use   • Smoking status: Former     Types: Cigarettes     Quit date:      Years since quittin 9   • Smokeless tobacco: Never   Vaping Use   • Vaping Use: Never used   Substance and Sexual Activity   • Alcohol use: Never     Comment: OCCASIONAL ALCOHOL USE IN ALL SCRIPTS   • Drug use: No   • Sexual activity: Not on file   Other Topics Concern   • Not on file   Social History Narrative   • Not on file     Social Determinants of Health Financial Resource Strain: Not on file   Food Insecurity: Not on file   Transportation Needs: Not on file   Physical Activity: Not on file   Stress: Not on file   Social Connections: Not on file   Intimate Partner Violence: Not on file   Housing Stability: Low Risk    • Unable to Pay for Housing in the Last Year: No   • Number of Places Lived in the Last Year: 1   • Unstable Housing in the Last Year: No     Family History   Problem Relation Age of Onset   • Diabetes Mother    • Lung cancer Mother    • Cancer Father    • Lung cancer Father    • Hypertension Brother    • Hypertension Family        MEDICATIONS / ALLERGIES:  all current active meds have been reviewed    Allergies   Allergen Reactions   • Heparin Other (See Comments)     History of HIT       OBJECTIVE:  /69 (BP Location: Left arm)   Pulse 88   Temp 97 6 °F (36 4 °C) (Temporal)   Resp 18   Ht 5' 4" (1 626 m)   Wt 85 9 kg (189 lb 6 oz)   SpO2 92%   BMI 32 51 kg/m²   Physical Exam:  Constitutional: Appears well-developed and well-nourished  Appears acutely and chronically ill looking  Pleasant  Comfortable  In no acute physical or emotional distress  Head: Normocephalic and atraumatic  Eyes: EOM are normal  No ocular discharge  No scleral icterus  Neck: No visible adenopathy or masses  Respiratory: Effort normal  No stridor  No respiratory distress  Gastrointestinal: No abdominal distension  Musculoskeletal: bilateral AKA  Neurological: Alert, oriented and appropriately conversant  Skin: Dry, no diaphoresis  Clammy  Pale  Psychiatric: Displays a depressed mood and flat affect  Behavior, judgment and thought content appear somewhat normal      Lab Results: I have personally reviewed pertinent labs  Imaging Studies: I have personally reviewed pertinent reports  EKG, Pathology, and Other Studies: I have personally reviewed pertinent reports        Counseling / Coordination of Care:  Counseling / Coordination of Care  Total floor / unit time spent today 45 minutes  Greater than 50% of total time was spent with the patient and / or family counseling and / or coordination of care  A description of the counseling / coordination of care: provided medical updates, discussed palliative care, determined competency, determined goals of care, determined POA, determined social/family support, discussed plans of care, discussed symptom management, provided psychosocial support       Shannan Marte MD  [address] and Supportive Care  [phone]

## 2022-12-05 NOTE — ASSESSMENT & PLAN NOTE
· Noted to have change in mental status on 11/13/2022  · CT confirmed moderate right parietal lobe stroke, likely embolic in the setting of HIT  · Heme Onc recommends Coumadin    Currently on argatroban bridge to Coumadin  · Trend INR goal 2-3

## 2022-12-05 NOTE — ASSESSMENT & PLAN NOTE
· Positive heparin antibody on 11/11/2020  · Appreciate heme Onc recommendations  · Argatroban to Coumadin bridge

## 2022-12-06 LAB
APTT PPP: 116 SECONDS (ref 23–37)
APTT PPP: 124 SECONDS (ref 23–37)
APTT PPP: 140 SECONDS (ref 23–37)
APTT PPP: 163 SECONDS (ref 23–37)
APTT PPP: 76 SECONDS (ref 23–37)
APTT PPP: 77 SECONDS (ref 23–37)
APTT PPP: 96 SECONDS (ref 23–37)
ERYTHROCYTE [DISTWIDTH] IN BLOOD BY AUTOMATED COUNT: 16.5 % (ref 11.6–15.1)
GLUCOSE SERPL-MCNC: 119 MG/DL (ref 65–140)
GLUCOSE SERPL-MCNC: 153 MG/DL (ref 65–140)
GLUCOSE SERPL-MCNC: 86 MG/DL (ref 65–140)
GLUCOSE SERPL-MCNC: 96 MG/DL (ref 65–140)
HCT VFR BLD AUTO: 26.8 % (ref 34.8–46.1)
HGB BLD-MCNC: 8.3 G/DL (ref 11.5–15.4)
INR PPP: 6.19 (ref 0.84–1.19)
MCH RBC QN AUTO: 26.2 PG (ref 26.8–34.3)
MCHC RBC AUTO-ENTMCNC: 31 G/DL (ref 31.4–37.4)
MCV RBC AUTO: 85 FL (ref 82–98)
PLATELET # BLD AUTO: 422 THOUSANDS/UL (ref 149–390)
PMV BLD AUTO: 8 FL (ref 8.9–12.7)
PROTHROMBIN TIME: 54.3 SECONDS (ref 11.6–14.5)
RBC # BLD AUTO: 3.17 MILLION/UL (ref 3.81–5.12)
WBC # BLD AUTO: 12.56 THOUSAND/UL (ref 4.31–10.16)

## 2022-12-06 PROCEDURE — 97530 THERAPEUTIC ACTIVITIES: CPT

## 2022-12-06 PROCEDURE — 99221 1ST HOSP IP/OBS SF/LOW 40: CPT | Performed by: GENERAL PRACTICE

## 2022-12-06 PROCEDURE — 99024 POSTOP FOLLOW-UP VISIT: CPT | Performed by: SURGERY

## 2022-12-06 PROCEDURE — 85730 THROMBOPLASTIN TIME PARTIAL: CPT | Performed by: FAMILY MEDICINE

## 2022-12-06 PROCEDURE — 85730 THROMBOPLASTIN TIME PARTIAL: CPT | Performed by: INTERNAL MEDICINE

## 2022-12-06 PROCEDURE — 99232 SBSQ HOSP IP/OBS MODERATE 35: CPT | Performed by: INTERNAL MEDICINE

## 2022-12-06 PROCEDURE — 82948 REAGENT STRIP/BLOOD GLUCOSE: CPT

## 2022-12-06 PROCEDURE — 85610 PROTHROMBIN TIME: CPT | Performed by: PHYSICIAN ASSISTANT

## 2022-12-06 PROCEDURE — 85027 COMPLETE CBC AUTOMATED: CPT | Performed by: PHYSICIAN ASSISTANT

## 2022-12-06 PROCEDURE — 97535 SELF CARE MNGMENT TRAINING: CPT

## 2022-12-06 RX ORDER — METOPROLOL SUCCINATE 25 MG/1
25 TABLET, EXTENDED RELEASE ORAL DAILY
Status: DISCONTINUED | OUTPATIENT
Start: 2022-12-06 | End: 2022-12-23

## 2022-12-06 RX ORDER — INSULIN GLARGINE 100 [IU]/ML
8 INJECTION, SOLUTION SUBCUTANEOUS
Status: DISCONTINUED | OUTPATIENT
Start: 2022-12-06 | End: 2022-12-18

## 2022-12-06 RX ADMIN — PANTOPRAZOLE SODIUM 40 MG: 40 TABLET, DELAYED RELEASE ORAL at 05:10

## 2022-12-06 RX ADMIN — INSULIN GLARGINE 8 UNITS: 100 INJECTION, SOLUTION SUBCUTANEOUS at 22:11

## 2022-12-06 RX ADMIN — CLOPIDOGREL BISULFATE 75 MG: 75 TABLET ORAL at 08:37

## 2022-12-06 RX ADMIN — METHOCARBAMOL 1000 MG: 500 TABLET ORAL at 22:11

## 2022-12-06 RX ADMIN — METHOCARBAMOL 1000 MG: 500 TABLET ORAL at 14:35

## 2022-12-06 RX ADMIN — OXYCODONE HYDROCHLORIDE 15 MG: 10 TABLET ORAL at 12:34

## 2022-12-06 RX ADMIN — POTASSIUM CHLORIDE 40 MEQ: 1500 TABLET, EXTENDED RELEASE ORAL at 18:34

## 2022-12-06 RX ADMIN — ACETAMINOPHEN 975 MG: 325 TABLET, FILM COATED ORAL at 05:10

## 2022-12-06 RX ADMIN — AMLODIPINE BESYLATE 10 MG: 10 TABLET ORAL at 08:37

## 2022-12-06 RX ADMIN — GABAPENTIN 400 MG: 400 CAPSULE ORAL at 08:37

## 2022-12-06 RX ADMIN — SERTRALINE HYDROCHLORIDE 150 MG: 100 TABLET ORAL at 08:37

## 2022-12-06 RX ADMIN — DOXAZOSIN 2 MG: 2 TABLET ORAL at 08:37

## 2022-12-06 RX ADMIN — QUETIAPINE FUMARATE 25 MG: 25 TABLET ORAL at 22:11

## 2022-12-06 RX ADMIN — ATORVASTATIN CALCIUM 40 MG: 40 TABLET, FILM COATED ORAL at 16:44

## 2022-12-06 RX ADMIN — GABAPENTIN 400 MG: 400 CAPSULE ORAL at 20:23

## 2022-12-06 RX ADMIN — ACETAMINOPHEN 975 MG: 325 TABLET, FILM COATED ORAL at 23:59

## 2022-12-06 RX ADMIN — METOPROLOL SUCCINATE 25 MG: 25 TABLET, EXTENDED RELEASE ORAL at 12:33

## 2022-12-06 RX ADMIN — POTASSIUM CHLORIDE 40 MEQ: 1500 TABLET, EXTENDED RELEASE ORAL at 08:37

## 2022-12-06 RX ADMIN — NYSTATIN: 100000 POWDER TOPICAL at 18:34

## 2022-12-06 RX ADMIN — METHOCARBAMOL 1000 MG: 500 TABLET ORAL at 05:10

## 2022-12-06 RX ADMIN — SENNOSIDES 8.6 MG: 8.6 TABLET, FILM COATED ORAL at 18:34

## 2022-12-06 RX ADMIN — NYSTATIN: 100000 POWDER TOPICAL at 08:38

## 2022-12-06 RX ADMIN — ACETAMINOPHEN 975 MG: 325 TABLET, FILM COATED ORAL at 12:32

## 2022-12-06 RX ADMIN — ARGATROBAN 1 MCG/KG/MIN: 50 INJECTION, SOLUTION INTRAVENOUS at 11:01

## 2022-12-06 RX ADMIN — OXYCODONE HYDROCHLORIDE 15 MG: 10 TABLET ORAL at 16:45

## 2022-12-06 RX ADMIN — GABAPENTIN 400 MG: 400 CAPSULE ORAL at 16:44

## 2022-12-06 RX ADMIN — SENNOSIDES 8.6 MG: 8.6 TABLET, FILM COATED ORAL at 08:37

## 2022-12-06 RX ADMIN — OXYCODONE HYDROCHLORIDE 15 MG: 10 TABLET ORAL at 20:22

## 2022-12-06 RX ADMIN — ACETAMINOPHEN 975 MG: 325 TABLET, FILM COATED ORAL at 18:34

## 2022-12-06 RX ADMIN — OXYCODONE HYDROCHLORIDE 15 MG: 10 TABLET ORAL at 06:35

## 2022-12-06 NOTE — ASSESSMENT & PLAN NOTE
Evaluated by Psychiatry on this admission  · Continue Zoloft 150 mg daily / Quetiapine 25mg po Qhs  · Repeat psychiatric evaluation pending    Concerned that depression is limiting patient's rehab potential

## 2022-12-06 NOTE — QUICK NOTE
Received message from pharmacist regarding coumadin/argatroban dosing while on call  Pt's chart reviewed - 65 Y F with confirmed HIT based on THALIA results from 11/11/22  HIT workup was initiated when she was noted to have thrombocytopenia with plt count trend as follows from 11/8/22: 105 > 83 > 60 > 40 > 36  Argatroban gtt was started on 11/11/22  Pt's plt count stable (>150) since 11/14/22  Primary team had ordered for coumadin to be started tonight (5 mg), however last INR is from 12/2/22  Pt's coags were drawn twice today, but INR values are unfortunately not resulting on the computer, possibly due to the value being too high? PT is being reported as >120 and PTT >210  Advised for the argatroban gtt to be titrated as per protocol based on PTT levels - based on PTT > 210, argatroban would need to be held as per protocol (hold for PTT > 120, recheck PTT every 2 hrs until PTT < 90 sec, then resume infusion at 50% of previous rate, etc)  As for the coumadin dosing, advised for the coumadin to be held tonight  Based on the INR levels tomorrow, primary team to reconsider coumadin dosing  The protocol is usually to continue argatroban gtt along with warfarin until INR is >4; once INR >4, argatroban gtt can be stopped  Then INR should be repeated in 4 to 6 hours; if INR is below therapeutic level, argatroban therapy may be restarted  Repeat procedure daily until desired INR on warfarin alone is obtained

## 2022-12-06 NOTE — PROGRESS NOTES
[facility]  [facility] [place] [address] 1960, 65 y o  female MRN: [id number]  Unit/Bed#: E4 -01 Encounter: [id number]  Primary Care Provider: ALEX Gamble   Date and time admitted to hospital: 10/21/2022  7:58 PM    * PAD (peripheral artery disease) [facility])  Assessment & Plan  65year old female former smoker w/HTN, HLD, uncontrolled type II DM (A1c 9 8), hx CVA, presenting with nonhealing extensive L heel ulceration and R hallux and 5th digit ulceration  Vascular surgery consulted for input  S/p multiple b/l endovascular interventions  JUSTIN  R hemispheric CVA    S/p L AKA 11/23/22 [facility])  S/p R AKA, wound debridement 12/1/22 [facility])      Recommendations:  - Bilateral tissue loss in the setting of uncontrolled type II DM and NYDIA on admission, now s/p multiple angiograms w/intervention    - Endovascular interventions, c/b atheroembolic event to the RLE and JUSTIN with R hemispheric CVA  - Now s/p L AKA on 11/23/22 and R AKA 12/1/22 w/ proximal thigh wound debridements  - Will change dressing today and assess wound    - Wound care and plastic surgery consult pending for recommendations regarding proximal thigh wounds   - continue argatroban drip per protocol will plan to bridge to Coumadin  Heme Onc note noted, plans goal INR is for while bridging to Coumadin with argatroban and once achieved, discontinue argatroban drip and recheck INR in 4-6 hours to ensure therapeutic levels  -INR pending this morning, will plan to start Coumadin tonight  - Medical management with statin, Plavix  - Medical management and glucose control per SLIM  - Continue oral pain regimen -will see if this we can get regional nerve blocks  -will re-engage psych for assessment and management for depression/grief    Subjective:     Patient continues to complain of significant pain with movement but appears comfortable at rest   Reports that the oxycodone is not helping      WBC 12 6 from 13 3  Hemoglobin 8 3 from 8 5  Platelet 230 from 836  INR pending    Supratherapeutic on her PTT, argatroban drip adjusted per protocol    Vitals:  /77 (BP Location: Left arm)   Pulse 82   Temp 97 5 °F (36 4 °C) (Temporal)   Resp 18   Ht 5' 4" (1 626 m)   Wt 78 8 kg (173 lb 11 6 oz)   SpO2 94%   BMI 29 82 kg/m²     I/Os:  I/O last 3 completed shifts: In: 584 [P O :540; I V :44]  Out: 425 [Urine:425]  No intake/output data recorded  Lab Results and Cultures:   CBC with diff:   Lab Results   Component Value Date    WBC 12 56 (H) 12/06/2022    HGB 8 3 (L) 12/06/2022    HCT 26 8 (L) 12/06/2022    MCV 85 12/06/2022     (H) 12/06/2022    MCH 26 2 (L) 12/06/2022    MCHC 31 0 (L) 12/06/2022    RDW 16 5 (H) 12/06/2022    MPV 8 0 (L) 12/06/2022    NRBC 0 12/05/2022   ,   BMP/CMP:  Lab Results   Component Value Date    SODIUM 141 12/05/2022    K 4 7 12/05/2022     12/05/2022    CO2 26 12/05/2022    BUN 9 12/05/2022    CREATININE 0 72 12/05/2022    CALCIUM 8 3 12/05/2022    AST 25 12/02/2022    ALT 12 12/02/2022    ALKPHOS 318 (H) 12/02/2022    EGFR 90 12/05/2022   ,   Lipid Panel: No results found for: CHOL,   Coags:   Lab Results   Component Value Date     (H) 12/06/2022    INR  12/05/2022      Comment:      Unable to calculate    ,     Blood Culture:   Lab Results   Component Value Date    BLOODCX No Growth After 5 Days   11/13/2022    BLOODCX Staphylococcus coagulase negative (A) 11/13/2022   ,   Urinalysis:   Lab Results   Component Value Date    COLORU Yellow 11/12/2022    CLARITYU Slightly Cloudy 11/12/2022    SPECGRAV 1 025 11/12/2022    PHUR 5 5 11/12/2022    PHUR 6 5 02/23/2018    LEUKOCYTESUR Trace (A) 11/12/2022    NITRITE Positive (A) 11/12/2022    GLUCOSEU Negative 11/12/2022    KETONESU Negative 11/12/2022    BILIRUBINUR Negative 11/12/2022    BLOODU Large (A) 11/12/2022   ,   Urine Culture:   Lab Results   Component Value Date    URINECX >100,000 cfu/ml Enterobacter cloacae complex (A) 11/12/2022    URINECX 70,000-79,000 cfu/ml Klisabelomyces ljanus (A) 11/12/2022   ,   Wound Culure: No results found for: WOUNDCULT    Medications:  Current Facility-Administered Medications   Medication Dose Route Frequency   • acetaminophen (TYLENOL) tablet 975 mg  975 mg Oral Q6H [facility]   • ALPRAZolam (XANAX) tablet 0 25 mg  0 25 mg Oral Daily PRN   • amLODIPine (NORVASC) tablet 10 mg  10 mg Oral Daily   • argatroban infusion (premix)  0 1-3 mcg/kg/min Intravenous Titrated   • atorvastatin (LIPITOR) tablet 40 mg  40 mg Oral Daily With Dinner   • clopidogrel (PLAVIX) tablet 75 mg  75 mg Oral Daily   • doxazosin (CARDURA) tablet 2 mg  2 mg Oral Daily   • gabapentin (NEURONTIN) capsule 400 mg  400 mg Oral TID   • HYDROmorphone HCl (DILAUDID) injection 0 2 mg  0 2 mg Intravenous Q3H PRN   • insulin glargine (LANTUS) subcutaneous injection 10 Units 0 1 mL  10 Units Subcutaneous HS   • insulin lispro (HumaLOG) 100 units/mL subcutaneous injection 1-6 Units  1-6 Units Subcutaneous TID AC   • methocarbamol (ROBAXIN) tablet 1,000 mg  1,000 mg Oral Q8H [facility]   • metoclopramide (REGLAN) injection 10 mg  10 mg Intravenous Q6H PRN   • naloxone (NARCAN) 0 04 mg/mL syringe 0 04 mg  0 04 mg Intravenous Q1MIN PRN   • nystatin (MYCOSTATIN) powder   Topical BID   • ondansetron (ZOFRAN) injection 4 mg  4 mg Intravenous Q6H PRN   • oxyCODONE (ROXICODONE) IR tablet 5 mg  5 mg Oral Q3H PRN    Or   • oxyCODONE (ROXICODONE) immediate release tablet 10 mg  10 mg Oral Q3H PRN    Or   • oxyCODONE (ROXICODONE) IR tablet 15 mg  15 mg Oral Q3H PRN   • pantoprazole (PROTONIX) EC tablet 40 mg  40 mg Oral Early Morning   • polyethylene glycol (MIRALAX) packet 17 g  17 g Oral BID   • potassium chloride (K-DUR,KLOR-CON) CR tablet 40 mEq  40 mEq Oral BID   • QUEtiapine (SEROquel) tablet 25 mg  25 mg Oral HS   • senna (SENOKOT) tablet 8 6 mg  1 tablet Oral BID   • sertraline (ZOLOFT) tablet 150 mg  150 mg Oral Daily       Physical Exam:    General: No acute distress  CV: Normal rate  Respiratory: Non-labored breathing  Abdominal: Soft  Extremities: Warm  Neurologic: Alert    Wound/Incision:  Left AKA stump site with staples in place, clean dry and intact without surrounding erythema  Right AKA stump site with dressing in place, clean dry and intact             Karo Morocho MD  12/6/2022

## 2022-12-06 NOTE — QUICK NOTE
Patient with confirmed HIT from [address] results from 11/11/22  Argatroban gtt was started on 11/11/22  Platelet count stable (>150) since 11/14/22  Today at 422K  Coumadin 5 mg held last night; last INR value on 12/2/22  Discussed to titrate argatroban per protocol  Once at a therapeutic range, can add warfarin  Then continue argatroban gtt along with warfarin until INR is >4  Once INR >4, argatroban gtt can be stopped  Then INR should be repeated in 4 - 6 hours; if INR is below therapeutic level, argatroban may be restarted  Repeat procedure daily until desired INR on warfarin alone is achieved  INR today at 6 19 still supratherapeutic

## 2022-12-06 NOTE — ASSESSMENT & PLAN NOTE
· Noted to have change in mental status on 11/13/2022  · CT confirmed moderate right parietal lobe stroke, likely embolic in the setting of HIT  · Heme Onc recommends Coumadin    Currently on argatroban bridge to Coumadin

## 2022-12-06 NOTE — CONSULTS
TeleConsultation - [address] [facility] 65 y o  female MRN: [id number]  Unit/Bed#: E4 -01 Encounter: [id number]        REQUIRED DOCUMENTATION:     1  This service was provided via Telemedicine  2  Provider located at [facility]   3  TeleMed provider: J Carlos Barry MD   4  Identify all parties in room with patient during tele consult: Patient   5  Patient was then informed that this was a Telemedicine visit and that the exam was being conducted confidentially over secure lines  My office door was closed  No one else was in the room  Patient acknowledged consent and understanding of privacy and security of the Telemedicine visit, and gave us permission to have the assistant stay in the room in order to assist with the history and to conduct the exam   I informed the patient that I have reviewed their record in Epic and presented the opportunity for them to ask any questions regarding the visit today  The patient agreed to participate       Discussed with Ana Lazaro DO    Assessment/Plan     Principal Problem:    PAD [facility]peripheral artery [facility] ([facility]  Active Problems:    Acute respiratory failure with hypoxia (HCC)    Depression, recurrent (HCC)    Benign essential hypertension    Stroke ([facility] )    Type 2 diabetes mellitus with hyperglycemia, with long-term current use of insulin (HCC)    Hypokalemia    Acute on chronic anemia    Diabetic ulcer of heel associated with diabetes mellitus due to underlying condition (HCC)    HIT (heparin-induced thrombocytopenia)    Febrile    Diarrhea    Assessment:    Adjustment Disorder with depressed mood    Treatment Plan:    Planned Medication Changes:    -Start Bupropion  mg qam     -Increase Sertraline to 175 mg qam     Current Medications:     Current Facility-Administered Medications   Medication Dose Route Frequency Provider Last Rate   • acetaminophen  975 mg Oral Q6H [facility] Jonathan Green MD     • ALPRAZolam  0 25 mg Oral Daily PRN Victor Manuel Coombs KETURAH     • amLODIPine  10 mg Oral Daily Thong Montelongo PA-C     • argatroban  0 1-3 mcg/kg/min Intravenous Titrated Thong Montelongo PA-C 1 mcg/kg/min (12/06/22 1101)   • atorvastatin  40 mg Oral Daily With Dinner Thong Montelongo PA-C     • clopidogrel  75 mg Oral Daily Thong Montelongo PA-C     • doxazosin  2 mg Oral Daily Thong Montelongo PA-C     • gabapentin  400 mg Oral TID Vu Mooney MD     • HYDROmorphone  0 2 mg Intravenous Q3H PRN Vu Mooney MD     • insulin glargine  8 Units Subcutaneous HS [facility] Lobe, DO     • insulin lispro  1-6 Units Subcutaneous TID [facility] Thong Montelongo PA-C     • methocarbamol  1,000 mg Oral [facility] Vu Mooney MD     • metoclopramide  10 mg Intravenous Q6H PRN Thong Montelongo PA-C     • metoprolol succinate  25 mg Oral Daily Maggy Fishman DO     • naloxone  0 04 mg Intravenous Q1MIN PRN Thong Montelongo PA-C     • nystatin   Topical BID Thong Montelongo PA-C     • ondansetron  4 mg Intravenous Q6H PRN Thong Montelongo PA-C     • oxyCODONE  5 mg Oral Q3H PRN Jose Luis Jamison MD      Or   • oxyCODONE  10 mg Oral Q3H PRN Jose Luis Jamison MD      Or   • oxyCODONE  15 mg Oral Q3H PRN Jose Luis Jamison MD     • pantoprazole  40 mg Oral Early Morning Thong Montelongo PA-C     • polyethylene glycol  17 g Oral BID Thong Montelongo PA-C     • potassium chloride  40 mEq Oral BID Thong Montelongo PA-C     • QUEtiapine  25 mg Oral HS Thong Montelongo PA-C     • senna  1 tablet Oral BID Thong Montelongo PA-C     • sertraline  150 mg Oral Daily Thong Montelongo PA-C         Risks / Benefits of Treatment:    Risks, benefits, and possible side effects of medications explained to patient and patient verbalizes understanding  Inpatient consult to Psychiatry  Consult performed by: Norm Morel MD  Consult ordered by:  Vu Mooney MD        Physician Requesting Consult: Roque Azevedo DO  Principal Problem:PAD (peripheral artery disease) ([facility] )    Reason for Consult: Psych Evaluation       History of Present Illness      Patient states that she is in severe pain and that this is causing her significant distress  Patient state that the pain is unbearable and she cant imagine all the challenges that she will face  Patient states that she told her daughter that she would rather die than going through the process  Patient states that she has a very poor appetite but is sleeping ok  Patient states that before her amputations she was happier because while limited she was able to do some activities  Patient states that before this week she has never experienced suicidal ideation and knows that it it would be the easy way out  Patient states that her family   Is her motivation to get better  Patient denied any acute or imminent safety concerns or other acute psychiatric complaints  Psychiatric Review Of Systems:    sleep: yes  appetite changes: yes  weight changes: no  energy/anergy: yes  interest/pleasure/anhedonia: no  somatic symptoms: yes  anxiety/panic: no  day: no  guilty/hopeless: yes  self injurious behavior/risky behavior: no    Historical Information     Past Psychiatric History:     Psychiatric Hospitalizations:   • No history of past inpatient psychiatric admissions  Outpatient Treatment History:   • Yes  Suicide Attempts:   • None  History of self-harm:   • None  Violence History:   • no  Past Psychiatric medication trials: Unable to recall     Substance Abuse History:    barbiturates route Denied   Use of Alcohol: denied    Longest clean time: Months   History of IP/OP rehabilitation program: None  Smoking history: Yes  Use of Caffeine: Yes    Family Psychiatric History: Denied         Social History:    Education: high school diploma/GED  Learning Disabilities: Denied  Marital history:   Children: Yes  Living arrangement, social support: The patient lives in home with family    Occupational History: retired  Functioning Relationships: good support system  Other Pertinent History: None    Traumatic History:     Abuse: None  Other Traumatic Events: none    Past Medical History:   Diagnosis Date   • Anxiety    • Depression    • Diabetes ([facility] )    • [facility] mellitus ([facility] )    • Esophageal reflux     28 APR 2015 RESOLVED   • Hyperlipidemia    • Hypertension    • Low back pain     sciatica   • Pneumonia 2019   • Stroke ([facility] ) 12/2015    small vessel, L frontal corona radiata   Rx asa 81, Lipitor 40, risk modification   • Vitamin D deficiency        Medical Review Of Systems:    Review of Systems    Meds/Allergies     all current active meds have been reviewed  Allergies   Allergen Reactions   • Heparin Other (See Comments)     History of HIT       Objective     Vital signs in last 24 hours:  Temp:  [97 2 °F (36 2 °C)-98 9 °F (37 2 °C)] 97 5 °F (36 4 °C)  HR:  [82-92] 82  Resp:  [18-19] 18  BP: (165-179)/(74-77) 170/77      Intake/Output Summary (Last 24 hours) at 12/6/2022 1221  Last data filed at 12/6/2022 0630  Gross per 24 hour   Intake 583 95 ml   Output 425 ml   Net 158 95 ml       Mental Status Evaluation:    Appearance:  age appropriate   Behavior:  Cooperative   Speech:  normal pitch and normal volume   Mood:  dysthymic   Affect:  increased in range   Language: naming objects   Thought Process:  perserverative   Associations intact associations   Thought Content:  normal   Perceptual Disturbances: None   Risk Potential: Suicidal Ideations none  Homicidal Ideations none  Potential for Aggression No   Sensorium:  person, place and time/date   Cognition:  recent and remote memory grossly intact   Consciousness:  alert    Attention: attention span and concentration were age appropriate   Intellect: within normal limits   Fund of Knowledge: awareness of current events: President   Insight:  fair   Judgment: fair   Muscle Strength:  Muscle Tone: normal NFT  normal   Gait/Station: normal gait/station   Motor Activity: no abnormal movements       Lab Results: I have personally reviewed all pertinent laboratory/tests results  Most Recent Labs:   Lab Results   Component Value Date    WBC 12 56 (H) 12/06/2022    RBC 3 17 (L) 12/06/2022    HGB 8 3 (L) 12/06/2022    HCT 26 8 (L) 12/06/2022     (H) 12/06/2022    RDW 16 5 (H) 12/06/2022    NEUTROABS 11 13 (H) 12/05/2022    SODIUM 141 12/05/2022    K 4 7 12/05/2022     12/05/2022    CO2 26 12/05/2022    BUN 9 12/05/2022    CREATININE 0 72 12/05/2022    GLUC 114 12/05/2022    GLUF 167 (H) 02/21/2022    CALCIUM 8 3 12/05/2022    AST 25 12/02/2022    ALT 12 12/02/2022    ALKPHOS 318 (H) 12/02/2022    TP 5 5 (L) 12/02/2022    ALB 1 0 (L) 12/02/2022    TBILI 0 43 12/02/2022    CHOLESTEROL 102 11/14/2022    HDL 28 (L) 11/14/2022    TRIG 93 11/14/2022    LDLCALC 55 11/14/2022    [place] 177 08/12/2021    [id number] 2 410 08/12/2021    HGBA1C 9 8 (H) 10/25/2022     10/25/2022       Imaging Studies: CTA head and neck w wo contrast    Result Date: 11/13/2022  Narrative: CTA NECK AND BRAIN WITH AND WITHOUT CONTRAST INDICATION: Stroke/TIA, determine embolic source stroke COMPARISON:   Same date CT TECHNIQUE:  Routine CT imaging of the Brain without contrast   Post contrast imaging was performed after administration of iodinated contrast through the neck and brain  Post contrast axial 0 625 mm images timed to opacify the arterial system  3D rendering was performed on an independent workstation  MIP reconstructions performed  Coronal reconstructions were performed of the noncontrast portion of the brain  Radiation dose length product (DLP) for this visit:  578 mGy-cm   This examination, like all CT scans performed in the [facility], was performed utilizing techniques to minimize radiation dose exposure, including the use of iterative reconstruction and automated exposure control     IV Contrast:  85 mL of iohexol (OMNIPAQUE)  IMAGE QUALITY:   Diagnostic FINDINGS: NONCONTRAST BRAIN PARENCHYMA:  Right parietal subacute ischemia  VENTRICLES AND EXTRA-AXIAL SPACES:  Normal for the patient's age  VISUALIZED ORBITS AND PARANASAL SINUSES:  Unremarkable  CERVICAL VASCULATURE AORTIC ARCH AND GREAT VESSELS:  Mild atherosclerotic disease of the arch, proximal great vessels and visualized subclavian vessels  No significant stenosis  RIGHT VERTEBRAL ARTERY CERVICAL SEGMENT:  Moderate stenosis at the origin  The vessel is normal in caliber throughout the neck  LEFT VERTEBRAL ARTERY CERVICAL SEGMENT:  Moderate stenosis at the origin  The vessel is normal in caliber throughout the neck  RIGHT EXTRACRANIAL CAROTID SEGMENT:  Bilateral common carotid retropharyngeal course with short segment moderate plaque stenosis  Normal bifurcation and cervical internal carotid artery  No stenosis or dissection  LEFT EXTRACRANIAL CAROTID SEGMENT:  Mild atherosclerotic disease of the distal common carotid artery and proximal cervical internal carotid artery without significant stenosis compared to the more distal ICA  NASCET criteria was used to determine the degree of internal carotid artery diameter stenosis  INTRACRANIAL VASCULATURE INTERNAL CAROTID ARTERIES:  Normal enhancement of the intracranial portions of the internal carotid arteries  Normal ophthalmic artery origins  Normal ICA terminus  ANTERIOR CIRCULATION:  Symmetric A1 segments and anterior cerebral arteries with normal enhancement  Normal anterior communicating artery  MIDDLE CEREBRAL ARTERY CIRCULATION:  M1 segment and middle cerebral artery branches demonstrate normal enhancement bilaterally  DISTAL VERTEBRAL ARTERIES:  Normal distal vertebral arteries  Posterior inferior cerebellar artery origins are normal  Normal vertebral basilar junction  BASILAR ARTERY:  Basilar artery is normal in caliber  Normal superior cerebellar arteries  POSTERIOR CEREBRAL ARTERIES: Both posterior cerebral arteries arises from the basilar tip  Both arteries demonstrate normal enhancement  Normal posterior communicating arteries  VENOUS STRUCTURES:  Normal  NON VASCULAR ANATOMY BONY STRUCTURES:  No acute osseous abnormality  SOFT TISSUES OF THE NECK:  Unremarkable  THORACIC INLET:  Small bilateral pleural effusions, partially visualized  Impression: Moderate bilateral vertebral artery origin plaque stenosis  Bilateral common carotid retropharyngeal course with short segment moderate plaque stenosis  No occlusion or dissection  Workstation performed: [id number]     XR chest portable    Result Date: 11/28/2022  Narrative: CHEST INDICATION:   hypoxia  COMPARISON:  CXR and CTA neck 11/13/2022, chest CT 2/23/2018  EXAM PERFORMED/VIEWS:  XR CHEST PORTABLE FINDINGS:  Right PICC at cavoatrial junction  Cardiomediastinal silhouette appears unremarkable  Mild pulmonary venous congestion  No effusion or pneumothorax  Osseous structures appear within normal limits for patient age  Impression: Mild pulmonary venous congestion  Workstation performed: [id number]     XR chest portable    Result Date: 11/13/2022  Narrative: CHEST INDICATION:   sirs  COMPARISON:  Chest radiograph from November 10, 2022 EXAM PERFORMED/VIEWS:  XR CHEST PORTABLE FINDINGS: Cardiomediastinal silhouette appears unremarkable  Mild pulmonary edema  Bibasilar atelectasis  No pneumothorax  Trace layering effusions  Osseous structures appear within normal limits for patient age  Impression: Mild pulmonary edema and trace layering pleural effusions  No definite consolidation  If continued clinical concern, suggest follow-up frontal and lateral views for further evaluation  Workstation performed: [id number]     XR chest portable    Result Date: 11/10/2022  Narrative: CHEST INDICATION:   cough/wheezing  COMPARISON:  CXR 11/8/2022, abdomen CT 11/9/2022, chest CT 2/23/2018  EXAM PERFORMED/VIEWS:  XR CHEST PORTABLE FINDINGS: Cardiomediastinal silhouette appears unremarkable   Mild pulmonary venous congestion  Question trace right effusion  No pneumothorax  Osseous structures appear within normal limits for patient age  Impression: Mild pulmonary venous congestion  Question trace right effusion  Workstation performed: [id number]     CT head wo contrast    Result Date: 11/15/2022  Narrative: CT BRAIN - WITHOUT CONTRAST INDICATION:   Stroke, follow up Stroke re eval  COMPARISON:  CTA head and neck with and without contrast November 13, 2022  TECHNIQUE:  CT examination of the brain was performed  In addition to axial images, sagittal and coronal 2D reformatted images were created and submitted for interpretation  Radiation dose length product (DLP) for this visit:  1036 mGy-cm   This examination, like all CT scans performed in the [facility], was performed utilizing techniques to minimize radiation dose exposure, including the use of iterative reconstruction and automated exposure control  IMAGE QUALITY:  Diagnostic  FINDINGS: PARENCHYMA: Evolving acute infarct in right parietal-temporal-occipital lobes with questionable petechial cortical hemorrhage in right parietal lobe  No acute parenchymal hematoma  Decreased attenuation is noted in periventricular and subcortical white matter demonstrating an appearance that is statistically most likely to represent mild microangiopathic change  No intracranial mass, mass effect or midline shift  Arterial calcifications of carotid siphons  VENTRICLES AND EXTRA-AXIAL SPACES:  Normal for the patient's age  VISUALIZED ORBITS AND PARANASAL SINUSES:  Orbits are normal   Small right maxillary retention cyst  CALVARIUM AND EXTRACRANIAL SOFT TISSUES:  Normal      Impression: Evolving acute infarct in right parietal-temporal-occipital lobes with questionable petechial cortical hemorrhage in right parietal lobe  No new acute intracranial abnormality  The study was marked in [facility] for immediate notification   Workstation performed: [id number]     CT head wo contrast    Result Date: 11/13/2022  Narrative: CT BRAIN - WITHOUT CONTRAST INDICATION:   Mental status change, unknown cause change in mental status, on full anticoagulation  COMPARISON:  2/25/2018 TECHNIQUE:  CT examination of the brain was performed  In addition to axial images, sagittal and coronal 2D reformatted images were created and submitted for interpretation  Radiation dose length product (DLP) for this visit:  1838 mGy-cm   This examination, like all CT scans performed in the [facility], was performed utilizing techniques to minimize radiation dose exposure, including the use of iterative reconstruction and automated exposure control  IMAGE QUALITY:  Motion artifact limits portions of the study  Portions of the study were repeated  Some diagnostic information is obtained  FINDINGS: PARENCHYMA:  There is a moderate region of wedge-shaped hypodensity extending from the periventricular margin to the cortical surface predominantly in the right parietal lobe indicative of a moderate, acute/recent infarction  There is no large hemorrhage  There is local sulcal effacement and mass effect without subfalcine herniation  Atherosclerotic calcifications of the cavernous segment of the internal carotid artery are mild  VENTRICLES AND EXTRA-AXIAL SPACES:  Mild effacement of the occipital horn of the right lateral ventricle secondary to adjacent cytotoxic edema in the right parietal lobe  VISUALIZED ORBITS AND PARANASAL SINUSES:  Unremarkable  CALVARIUM AND EXTRACRANIAL SOFT TISSUES:  Normal      Impression: 1  Moderate-sized acute/recent infarction centered on the right parietal lobe with local mass effect  No acute intracranial hemorrhage  Workstation performed: [id number]     IR lower extremity angiogram    Result Date: 11/21/2022  Narrative: PLEASE SEE REPORT FOR THIS PROCEDURE IN PATIENT'S CHART UNDER OP NOTE      IR lower extremity angiogram    Result Date: 11/17/2022  Narrative: IR LOWER EXTREMITY ANGIOGRAM Indication: Bilateral foot wounds  Recent bilateral leg intervention x2  Acute occlusion right common iliac and left SFA stents  Procedure: Bilateral groins were prepped and draped in sterile fashion  1% lidocaine was used for local anesthetic  IV sedation was given  The right groin was surveyed with ultrasound to assess for vessel patency  The right common femoral artery was punctured using a 21gauge needle and direct sonographic guidance  A static sonographic image was saved demonstrating needle entry  A 5 Lao vascular sheath was placed  A flush catheter was advanced into the distal aorta and AP arteriography of the pelvis was performed  The sheath was exchanged for a 6 Lao sheath and a 1 4 wire was advanced into the aorta  Thrombectomy of the right common iliac artery stent was performed with Penumbra CATRx aspiration thrombectomy  Repeat arteriography was performed  The stent was dilated to 7 mm and relined with an 8 x 39 mm Express LD balloon expandable stent  Arteriography was performed  The sheath was advanced into the left iliac system following angioplasty of the left common iliac artery with the 7 mm balloon  Occluded left SFA was crossed and an 014 wire was advanced into the below-knee popliteal artery  Aspiration thrombectomy of the stent was performed with the same Penumbra catheter and repeat arteriography was performed  Several passes were made  The small size NAV6 embolic protection device was deployed in the above-knee popliteal artery  The entire length of the stent including 2 cm proximal distal to the stent were treated with 5 mm angioplasty  Repeat arteriography was performed  An additional 6 mm drug-eluting stent was deployed across the distal margin into the above-knee popliteal artery across residual disease  Treated again with the 5 mm balloon and follow-up arteriography   The popliteal artery was imaged to ensure no distal embolus in the distal protection device was removed  The popliteal artery was treated with 4 mm angioplasty  Completion arteriography was performed to the foot  The right common femoral access site was closed with a Mynx closure device and direct manual compression  Fluoroscopy time (min) : 31 4 MINUTES Images: Multiple processed and unprocessed fluoroscopic images Contrast (ml) : 120 cc Visipaque 320  Sedation (min) : 120 minutes Findings: Proximal margin of the right common iliac artery stent is deformed resulting in stent occlusion  No residual thrombus was seen after aspiration thrombectomy  The stent was dilated to 7 mm and then relined with an 8mm uncovered balloon expandable stent good result  There is no residual stenosis  Atherosclerotic disease of the left common iliac artery was treated with 7 mm angioplasty to allow for sheath placement into the left iliac system  Occluded left distal SFA stent was treated with atherectomy with residual in-stent stenosis and resistant or adherent thrombus just distal to the stent margin which was treated with angioplasty and additional stent placement distally using distal protection with good result  There is no residual stenosis and no distal embolus  Moderate diffuse left popliteal occlusive disease responded well to 4 mm angioplasty with no residual stenosis  At completion, there is single vessel anterior tibial runoff into the foot but with sluggish flow beyond the distal calf  Impression: Impression: 1  Disrupted proximal margin of right common iliac artery stent with stenosis and possible stent thrombosis treated with aspiration thrombectomy, 7 mm PTA, and 8 mm Express LD balloon expandable stent placement with good result  Palpable right common femoral pulse  No residual stenosis  No embolus to the right common femoral bifurcation   2  Thrombosed distal left SFA stent treated with aspiration thrombectomy, PTA with distal protection, and additional 6 x 60 mm loop via stent placement across distal margin with good result  No residual stenosis  3  Diffuse left popliteal artery disease treated with 4 mm angioplasty with no residual stenosis 4  Patent anterior tibial artery but with abnormal flow distally which may be related lack of outflow  4 mg tPA instilled into the anterior tibial artery  No further intervention performed  5  Mynx closure right common femoral artery  Successful  Workstation performed: [id number]     CTA ABDOMEN W RUN OFF W WO CONTRAST    Result Date: 11/9/2022  Narrative: CT ANGIOGRAM OF THE AORTA AND LOWER EXTREMITIES WITH IV CONTRAST INDICATION:  Chronic limb ischemia with tissue loss in both legs  Previous endovascular interventions  COMPARISON: To CTA studies and 2 arteriograms since October 27, 2022 TECHNIQUE:  CT angiogram examination of the abdomen, pelvis, and lower extremities was performed according to standard protocol with intravenous contrast   This examination, like all CT scans performed in the [facility], was performed utilizing techniques to minimize radiation dose exposure, including the use of iterative reconstruction and automated exposure control  3D reconstructions were performed an independent workstation, and are supplied for review  Rad dose 3041 mGy-cm IV Contrast:  145 mL of iohexol (OMNIPAQUE)  FINDINGS: VASCULAR STRUCTURES:   Proximal right common iliac artery stent does not look opacified on CTA  No other significant common or external iliac lesion on the right side  Right common femoral arteries mildly calcified but without focal stenosis  Common femoral bifurcation filling defect is no longer present  No clear evidence of residual dissection  Right SFA is diffusely small with multiple mild stenoses proximally  Long stent within mid and distal SFA seems patent but unable to determine any residual stenosis due to stent artifact and very small luminal caliber    Popliteal artery is patent with multiple mild stenoses throughout and diffusely small caliber  No focal flow-limiting stenosis is identified  Anterior tibial and posterior tibial arteries are intact to the ankle  Posterior tibial artery occludes proximal to the calcaneus  Dorsalis pedis artery is severely stenotic distally and possibly segmentally occluded  Peroneal artery is patent but communicating arteries are not opacified  In the left leg, there is a calcified stenosis at the origin the left common iliac difficult to quantify  It is not well seen on prior arteriograms  Based on reconstructions the lesion measures 30-40% in severity  No other common or external iliac lesion  No significant left common femoral stenosis  Left SFA is diffusely small and diseased  Recent proximal and distal stenting  Proximal left SFA stent appears patent  Distal stent is occluded  Popliteal artery is severely stenotic distal to the stent  Below-knee popliteal artery is diffusely stenotic with severe stenosis at origin of the anterior tibial artery  Anterior tibial artery is patent  Posterior tibial artery is only segmentally seen proximally and occluded distally  Peroneal artery is patent  Dorsalis pedis arteries patent  OTHER FINDINGS ABDOMEN LOWER CHEST:  Small bilateral pleural effusions  LIVER/BILIARY TREE:  Unremarkable  GALLBLADDER:  No calcified gallstones  No pericholecystic inflammatory change  SPLEEN:  Asymmetric hyperperfusion into the upper pole of uncertain significance  Not seen previously  Maybe related to phase of contrast   Splenic artery is patent  No suggestion of embolic occlusion  PANCREAS:  Unremarkable  ADRENAL GLANDS: Unremarkable  KIDNEYS/URETERS:  No solid renal mass  No hydronephrosis  No urinary tract calculi  PELVIS REPRODUCTIVE ORGANS:  Unremarkable for patient's age  URINARY BLADDER:  Jacinto in place  Nondistended bladder  ADDITIONAL ABDOMINAL AND PELVIC STRUCTURES STOMACH AND BOWEL:  Unremarkable   ABDOMINOPELVIC CAVITY:   New small volume ascites ABDOMINAL WALL/INGUINAL REGIONS:  Diffuse subcutaneous edema OSSEOUS STRUCTURES:  No acute fracture or destructive osseous lesion     Diffuse subcutaneous edema in both legs  Impression: 1  Suspect interval occlusion of recently placed proximal right common iliac artery stent 2  No residual filling defect or dissection involving right common femoral artery  Diffusely small diseased right SFA and popliteal artery without focal flow-limiting stenosis identified  Patent recently placed SFA stent  3   Intact anterior tibial artery with stenotic and/or occluded dorsalis pedis artery  Posterior tibial artery occludes at the ankle  Patent peroneal artery without opacification of anterior and posterior communicating arteries  4   Near circumferentially calcified stenosis of proximal left common iliac artery  The lesion measures 30-40% in severity by reconstructions which are compromised by artifact and software limitations and therefore difficult to exclude a focal flow-limiting stenosis within this segment  5   No significant left common femoral stenosis  SFA is diffusely diseased  Newly placed proximal stent is patent but distal SFA stent is occluded  Popliteal artery is diffusely stenotic  6   Left popliteal artery is diffusely stenotic which seems new from the prior arteriogram and may be related to acute stent occlusion  Left anterior tibial artery is intact  Posterior tibial artery occludes at the calcaneus  7   Small bilateral pleural effusions 8  Small volume ascites new from prior CT with diffuse subcutaneous edema Workstation performed: [id number]     VAS lower limb arterial duplex, complete bilateral    Result Date: 11/9/2022  Narrative:  [facility] REPORT [facility]INICAL: Indications:  Evaluation s/p intervention  Bilateral lower extremity ulcerations   Operative History: 2022-11-07 Right CFA arterectomy w/  PTA 2022-11-07 Right Popliteal PTA and Posterior tibial PTA 2022-11-07 Right SFA PTA/ stent placement 2022-10-31 Abdominal aortogram 2022-10-31 Right ALLISON Stent 2022-10-31 Right PTA stent 2022-10-31 Left SFA shockwave angioplasty Risk Factors The patient has history of Obesity, HTN, Diabetes, CVA, Hyperlipidemia and previous smoking  Clinical Right Pressure:  188/ mm Hg, Left Pressure: IV  FINDINGS:  Segment                Right            Left                                          PSV (cm/s)  EDV  Impression  PSV (cm/s)  EDV  Common Femoral Artery          81   17                     145   20  Prox Profunda                 174   16                     230   12  Prox SFA                      143   14                      89    8  Mid SFA                        90    9  Occluded             0    0  Dist SFA                       70    8  Occluded             0    0  Proximal Pop                   41   11                      27   11  Distal Pop                     50    5                      20    7  Tibioperoneal                  40                           39       Dist Post Tibial                8    0                       0    0  Dist  Ant  Tibial              21    0                       8    3     CONCLUSION: Impression: RIGHT LOWER LIMB: Monophasic waveforms in the common femoral artery suggesting more proximal disease with diffuse disease throughout the remaining portions of the femoral-popliteal arteries without significant focal stenosis  Findings suggests tibioperoneal disease  Ankle/Brachial index: unobtainable secondary to indistinguishable Doppler signal, previous study 0 51  PVR tracing at the ankle is dampened  The PVR tracing at the metatarsal level and PPG waveform of the great toe are flat lined, suggesting poor distal perfusion, therefore, pressures are unobtainable  LEFT LOWER LIMB: An occlusion of the mid superficial femoral artery with minimal distal reconstitution  Findings suggests tibioperoneal disease   Ankle/Brachial index: unobtainable secondary to indistinguishable Doppler signal, previous study 0 74  The PVR tracings at the ankle and metatarsal levels are flat lined with the PPG waveform of the great toe is flat lined, suggesting poor distal perfusion, therefore, pressures are unobtainable  In comparison to the studies dated 10/24/2022 and 11/1/2022, there is progression in the disease process bilaterally  SIGNATURE: Electronically Signed by: Emma Mcdonnell on 2022-11-09 12:42:16 PM    XR chest portable ICU    Result Date: 11/9/2022  Narrative: CHEST INDICATION:   Increased o2 requirement  COMPARISON:  10/31/2022 EXAM PERFORMED/VIEWS:  XR CHEST PORTABLE ICU FINDINGS:  Patient rotation limits evaluation of the cardiomediastinal structures  Grossly stable cardiomediastinal structures  Central pulmonary vascular congestion with perihilar and basilar interstitial edema  No large effusions  No pneumothorax  No acute osseous abnormality  Stable bony structures  Impression: Moderate pulmonary edema, similar to the prior examination  No large effusions  Workstation performed: [id number]     [address] [facility] [facility] bedside procedure    Result Date: 11/23/2022  Narrative: 1 2 840 [id number]  2 446 597 [id number]  1 1    Echo follow up/limited w/ contrast if indicated    Result Date: 11/14/2022  Narrative: •  Left Ventricle: Left ventricular cavity size is normal  Wall thickness is normal  The left ventricular ejection fraction is 61%  Systolic function is normal  Wall motion is normal  Diastolic function is normal  •  Mitral Valve: There is mild annular calcification  Compared to prior echocardiographic study dated 10/30/2022, there is no significant change      EKG/Pathology/Other Studies:   Lab Results   Component Value Date    VENTRATE 81 11/14/2022    ATRIALRATE 81 11/14/2022    PRINT 133 11/14/2022    QRSDINT 79 11/14/2022    QTINT 383 11/14/2022    QTCINT 445 11/14/2022    PAXIS 49 11/14/2022    QRSAXIS 44 11/14/2022    TWAVEAXIS 67 11/14/2022        Code Status: Level 1 - Full Code  Advance Directive and Living Will:      Power of : Yes  POLST:      Counseling / Coordination of Care: Total floor / unit time spent today 30 minutes  Greater than 50% of total time was spent with the patient and / or family counseling and / or coordination of care  A description of the counseling / coordination of care: Direct Patient Care, Chart Review, and Documentation

## 2022-12-06 NOTE — ASSESSMENT & PLAN NOTE
· Initially admitted for nonhealing wound of the left lower extremity, requiring multiple procedures ultimately required left AKA on 11/23/2022 and right AKA on 12/1/22  · Postoperative wound and pain management per primary service  · Eventual short-term rehab placement  · PT/OT discussed concerns about patient's participation with therapy   Recommended timing pain medication prior to therapy   · Psych consult pending

## 2022-12-06 NOTE — PLAN OF CARE
Problem: OCCUPATIONAL THERAPY ADULT  Goal: Performs self-care activities at highest level of function for planned discharge setting  See evaluation for individualized goals  Description: Treatment Interventions: ADL retraining, Functional transfer training, UE strengthening/ROM, Endurance training, Cognitive reorientation, Patient/family training, Equipment evaluation/education, Compensatory technique education, Energy conservation, Activityengagement          See flowsheet documentation for full assessment, interventions and recommendations  Note: Limitation: Decreased ADL status, Decreased UE strength, Decreased endurance, Decreased high-level ADLs (New L visual field deficits)  Prognosis: Good  Assessment: Pt seen for 28 min tx session with focus on functional mobility, functional mobility, and education  Pt s/p R AKA(12/1)  Pt declining EOB mobility, but attempted long-sit in bed; dependent, sitting balance=p-  Pt able to demonstrate good orientation and b/l UE AROM/strength(4/5 throughout)  Continued to review pt's personal goals and need for OOB mobility  Reviewed with MD possible medication schedule to improve pain tolerance for functional mobility tasks  Will attempt to continue       OT Discharge Recommendation: Post acute rehabilitation services

## 2022-12-06 NOTE — PROGRESS NOTES
[facility]  Progress Note [facility] [address] [facility] [place] [address] 1960, 65 y o  female MRN: [id number]  Unit/Bed#: E4 -01 Encounter: [id number]  Primary Care Provider: ALEX Ventura   Date and time admitted to hospital: 10/21/2022  7:58 PM    * PAD (peripheral artery disease) [facility])  Assessment & Plan  · Initially admitted for nonhealing wound of the left lower extremity, requiring multiple procedures ultimately required left AKA on 11/23/2022 and right AKA on 12/1/22  · Postoperative wound and pain management per primary service  · Eventual short-term rehab placement  · PT/OT discussed concerns about patient's participation with therapy  Recommended timing pain medication prior to therapy   · Psych consult pending     HIT (heparin-induced thrombocytopenia)  Assessment & Plan  · Positive heparin antibody on 11/11/2020  · Appreciate heme Onc recommendations  · Plan for argatroban to Coumadin bridge  · Coumadin on hold for now until argatroban is therapeutic    Acute on chronic anemia  Assessment & Plan  Hemoglobin stable    Hypokalemia  Assessment & Plan  Replete  Recent Labs     12/04/22  0907 12/05/22  0546   K 4 5 4 7     Resolved    Type 2 diabetes mellitus with hyperglycemia, with long-term current use of insulin [facility])  Assessment & Plan  Lab Results   Component Value Date    HGBA1C 9 8 (H) 10/25/2022     Recent Labs     12/05/22  2112 12/06/22  0719 12/06/22  1056 12/06/22  1608   POCGLU 93 86 96 119     Significantly reduced regimen from home due to poor oral intake  Decrease Lantus to 8 units nightly  Continue to titrate      Stroke [facility])  Assessment & Plan  · Noted to have change in mental status on 11/13/2022  · CT confirmed moderate right parietal lobe stroke, likely embolic in the setting of HIT  · Heme Onc recommends Coumadin    Currently on argatroban bridge to Coumadin  · "The protocol is usually to continue argatroban gtt along with warfarin until INR is >4; once INR >4, argatroban gtt can be stopped  Then INR should be repeated in 4 to 6 hours; if INR is below therapeutic level, argatroban therapy may be restarted  Repeat procedure daily until desired INR on warfarin alone is obtained "  · Discussed elevated INR with hematology  INR related to supratherapeutic argatroban  Hematology recommends following argatroban algorithm to obtain therapeutic levels  At that time check INR  · Recheck INR in the morning  · Cruciate heme-onc recommendations    Benign essential hypertension  Assessment & Plan  Blood pressure elevated  Continue Norvasc 10 mg daily and Cardura 2 mg daily  Resume metoprolol    Depression, recurrent (HCC)  Assessment & Plan  Evaluated by Psychiatry on this admission  · Continue Zoloft 150 mg daily / Quetiapine 25mg po Qhs  · Repeat psychiatric evaluation pending  Concerned that depression is limiting patient's rehab potential    Acute respiratory failure with hypoxia [facility])  Assessment & Plan  Multifactorial secondary to atelectasis, hypoventilation and sedentary  Resolved  VTE Pharmacologic Prophylaxis: Argatroban    Patient Centered Rounds:  Patient care rounds were performed with nursing    Time Spent for Care: 30  More than 50% of total time spent on counseling and coordination of care as described above  Current Length of Stay: 45 day(s)    Current Patient Status: Inpatient   Certification Statement: The patient will continue to require additional inpatient hospital stay due to for IV medications    Discharge Plan: Per primary team    Code Status: Level 1 - Full Code      Subjective:   Patient seen and evaluated at bedside  She reports continued pain is her primary concern  Presents with movement and with palpation      Objective:     Vitals:   Temp (24hrs), Av °F (36 7 °C), Min:97 5 °F (36 4 °C), Max:98 9 °F (37 2 °C)    Temp:  [97 5 °F (36 4 °C)-98 9 °F (37 2 °C)] 97 5 °F (36 4 °C)  HR:  [82-92] 87  Resp:  [18-19] 18  BP: (165-170)/(71-77) 170/71  SpO2:  [92 %-94 %] 92 %  Body mass index is 29 82 kg/m²  Input and Output Summary (last 24 hours): Intake/Output Summary (Last 24 hours) at 12/6/2022 1720  Last data filed at 12/6/2022 0630  Gross per 24 hour   Intake 583 95 ml   Output 425 ml   Net 158 95 ml       Physical Exam:     Physical Exam  Vitals reviewed  Constitutional:       General: She is not in acute distress  Appearance: She is well-developed  She is not ill-appearing, toxic-appearing or diaphoretic  HENT:      Head: Normocephalic and atraumatic  Mouth/Throat:      Mouth: Mucous membranes are moist    Eyes:      General: No scleral icterus  Extraocular Movements: Extraocular movements intact  Cardiovascular:      Rate and Rhythm: Normal rate and regular rhythm  Heart sounds: Normal heart sounds  Pulmonary:      Effort: Pulmonary effort is normal  No respiratory distress  Breath sounds: Normal breath sounds  No wheezing or rales  Abdominal:      General: There is no distension  Palpations: Abdomen is soft  Tenderness: There is no abdominal tenderness  There is no guarding or rebound  Musculoskeletal:         General: No swelling, tenderness or deformity  Skin:     General: Skin is warm and dry  Neurological:      General: No focal deficit present  Mental Status: She is alert  Mental status is at baseline  Psychiatric:      Comments: Depressed affect         Additional Data:     Labs:  I have reviewed pertinent results     Results from last 7 days   Lab Units 12/06/22  0507 12/05/22  0546   WBC Thousand/uL 12 56* 13 27*   HEMOGLOBIN g/dL 8 3* 8 5*   HEMATOCRIT % 26 8* 27 8*   PLATELETS Thousands/uL 422* 447*   NEUTROS PCT %  --  85*   LYMPHS PCT %  --  6*   MONOS PCT %  --  6   EOS PCT %  --  2     Results from last 7 days   Lab Units 12/05/22  0546 12/04/22  0907 12/02/22  1024   SODIUM mmol/L 141   < >  --    POTASSIUM mmol/L 4 7   < >  --    CHLORIDE mmol/L 106   < >  --    CO2 mmol/L 26   < >  --    BUN mg/dL 9   < >  --    CREATININE mg/dL 0 72   < >  --    ANION GAP mmol/L 9   < >  --    CALCIUM mg/dL 8 3   < >  --    ALBUMIN g/dL  --   --  1 0*   TOTAL BILIRUBIN mg/dL  --   --  0 43   ALK PHOS U/L  --   --  318*   ALT U/L  --   --  12   AST U/L  --   --  25   GLUCOSE RANDOM mg/dL 114   < >  --     < > = values in this interval not displayed       Results from last 7 days   Lab Units 12/06/22  0507   INR  6 19*     Results from last 7 days   Lab Units 12/06/22  1608 12/06/22  1056 12/06/22  0719 12/05/22  2112 12/05/22  1548 12/05/22  1119 12/05/22  0735 12/04/22  2114 12/04/22  1656 12/04/22  1208 12/04/22  0720 12/03/22  2109   POC GLUCOSE mg/dl 119 96 86 93 94 115 99 109 105 100 130 148*                 Imaging: I have reviewed pertinent imaging       Recent Cultures (last 7 days):           Last 24 Hours Medication List:   Current Facility-Administered Medications   Medication Dose Route Frequency Provider Last Rate   • acetaminophen  975 mg Oral Q6H [address] Dalila Thomas MD     • ALPRAZolam  0 25 mg Oral Daily PRN Bryan Paige PA-C     • amLODIPine  10 mg Oral Daily Bryan Paige PA-C     • argatroban  0 1-3 mcg/kg/min Intravenous Titrated Bryan Paige PA-C Stopped (12/06/22 1550)   • atorvastatin  40 mg Oral Daily With Dinner Bryan Paige PA-C     • clopidogrel  75 mg Oral Daily Bryan Paige PA-C     • doxazosin  2 mg Oral Daily Bryan Paige PA-C     • gabapentin  400 mg Oral TID Dalila Thomas MD     • HYDROmorphone  0 2 mg Intravenous Q3H PRN Dalila Thomas MD     • insulin glargine  8 Units Subcutaneous HS Lisa Leung DO     • insulin lispro  1-6 Units Subcutaneous TID [facility] Bryan Paige PA-C     • methocarbamol  1,000 mg Oral [facility] Dalila Thomas MD     • metoclopramide  10 mg Intravenous Q6H PRN Bryan Paige PA-C     • metoprolol succinate  25 mg Oral Daily Lisa Leung DO     • naloxone  0 04 mg Intravenous Q1MIN PRN Pilo Moreau PA-C     • nystatin   Topical BID Pilo Moreau PA-C     • ondansetron  4 mg Intravenous Q6H PRN Pilo Moreau PA-C     • oxyCODONE  5 mg Oral Q3H PRN Roland Jamison MD      Or   • oxyCODONE  10 mg Oral Q3H PRN Rolnad Jamison MD      Or   • oxyCODONE  15 mg Oral Q3H PRN Roland Jamison MD     • pantoprazole  40 mg Oral Early Morning Pilo Moreau PA-C     • polyethylene glycol  17 g Oral BID Pilo Moreau PA-C     • potassium chloride  40 mEq Oral BID Pilo Moreau PA-C     • QUEtiapine  25 mg Oral HS Pilo Moreau PA-C     • senna  1 tablet Oral BID Pilo Moreau PA-C     • sertraline  150 mg Oral Daily Pilo Moreau PA-C          Today, Patient Was Seen By: Saadia Delarosa DO    ** Please Note: Dictation voice to text software may have been used in the creation of this document   **

## 2022-12-06 NOTE — ASSESSMENT & PLAN NOTE
Replete  Recent Labs     12/04/22  0907 12/05/22  0546   K 4 5 4 7     Recheck potassium today as I would like to resume patient's lisinopril  Discontinue scheduled Kdur

## 2022-12-06 NOTE — ASSESSMENT & PLAN NOTE
· Noted to have change in mental status on 11/13/2022  · CT confirmed moderate right parietal lobe stroke, likely embolic in the setting of HIT  · Heme Onc recommends Coumadin  Currently on argatroban bridge to Coumadin  · "The protocol is usually to continue argatroban gtt along with warfarin until INR is >4; once INR >4, argatroban gtt can be stopped  Then INR should be repeated in 4 to 6 hours; if INR is below therapeutic level, argatroban therapy may be restarted  Repeat procedure daily until desired INR on warfarin alone is obtained "  · INR checked this morning greater than 6  Unclear if this is related to recent dose of Xarelto? Patient does not have a history of liver disease    · Cruciate heme-onc recommendations

## 2022-12-06 NOTE — OCCUPATIONAL THERAPY NOTE
Occupational Therapy Progress Note(time=6194-6156)     Patient Name: Madi [facility] 110  [id number] Date: 12/6/2022  Problem List  Principal Problem:    PAD (peripheral artery disease) ([email or web address] )  Active Problems:    Acute respiratory failure with hypoxia (HCC)    Depression, recurrent (HCC)    Benign essential hypertension    Stroke ([email or web address] 75 )    Type 2 diabetes mellitus with hyperglycemia, with long-term current use of insulin ([facility])    Hypokalemia    Acute on chronic anemia    Diabetic ulcer of heel associated with diabetes mellitus due to underlying condition ([facility])    HIT (heparin-induced thrombocytopenia)    Febrile    Diarrhea            12/06/22 1227   Note Type   Note Type Treatment   Pain Assessment   Pain Assessment Tool 0-10   Pain Score 5   Pain Location/Orientation Orientation: Bilateral;Location: Leg   Restrictions/Precautions   Weight Bearing Precautions Per Order Yes   RLE Weight Bearing Per Order NWB   LLE Weight Bearing Per Order NWB   Bed Mobility   Supine to Sit 1  Dependent   Additional items Assist x 2; Increased time required;Verbal cues;LE management   Transfers   Sit to Stand   (n/a)   Functional Mobility   Functional Mobility   (recommend [facility] for OOB with nsg)   Subjective   Subjective "I just can't "   Cognition   Overall Cognitive Status Impaired   Arousal/Participation Alert   Attention Attends with cues to redirect   Orientation Level Oriented to person;Oriented to place;Oriented to time   Memory Decreased recall of precautions   Following Commands Follows one step commands without difficulty   Activity Tolerance   Activity Tolerance Patient limited by fatigue;Patient limited by pain   Medical Staff Made Aware nsg, PT, MD   Assessment   Assessment Pt seen for 28 min tx session with focus on functional mobility, functional mobility, and education  Pt s/p R AKA(12/1)  Pt declining EOB mobility, but attempted long-sit in bed; dependent, sitting balance=p-   Pt able to demonstrate good orientation and b/l UE AROM/strength(4/5 throughout)  Continued to review pt's personal goals and need for OOB mobility  Reviewed with MD possible medication schedule to improve pain tolerance for functional mobility tasks  Will attempt to continue  Plan   Treatment Interventions ADL retraining;Functional transfer training;UE strengthening/ROM; Endurance training;Cognitive reorientation;Patient/family training;Equipment evaluation/education; Compensatory technique education   Goal Expiration Date 12/14/22   OT Treatment Day 12   OT Frequency 2-3x/wk   Recommendation   OT Discharge Recommendation Post acute rehabilitation services   AM-PAC Daily Activity Inpatient   Lower Body Dressing 1   Bathing 1   Toileting 1   Upper Body Dressing 2   Grooming 3   Eating 4   Daily Activity Raw Score 12   Daily Activity Standardized Score (Calc for Raw Score >=11) 30 6   AM-PAC Applied Cognition Inpatient   Following a Speech/Presentation 3   Understanding Ordinary Conversation 3   Taking Medications 3   Remembering Where Things Are Placed or Put Away 3   Remembering List of 4-5 Errands 2   Taking Care of Complicated Tasks 2   Applied Cognition Raw Score 16   Applied Cognition Standardized Score 35 03   Jesus Hayward

## 2022-12-06 NOTE — ASSESSMENT & PLAN NOTE
Blood pressure elevated  Continue Norvasc 10 mg daily and Cardura 2 mg daily, metoprolol  Resume lisinopril

## 2022-12-06 NOTE — ASSESSMENT & PLAN NOTE
65year old female former smoker w/HTN, HLD, uncontrolled type II DM (A1c 9 8), hx CVA, presenting with nonhealing extensive L heel ulceration and R hallux and 5th digit ulceration  Vascular surgery consulted for input  S/p multiple b/l endovascular interventions  JUSTIN  R hemispheric CVA    S/p L AKA 11/23/22 [facility])  S/p R AKA, wound debridement 12/1/22 [facility])      Recommendations:  - Bilateral tissue loss in the setting of uncontrolled type II DM and NYDIA on admission, now s/p multiple angiograms w/intervention    - Endovascular interventions, c/b atheroembolic event to the RLE and JUSTIN with R hemispheric CVA  - Now s/p L AKA on 11/23/22 and R AKA 12/1/22 w/ proximal thigh wound debridements  - Plastic surgery input appreciated  Recommending daily santyl to R thigh wounds and possible future wound VAC to areas  - Continue argatroban drip per protocol will plan to bridge to Coumadin  Heme Onc note noted  -INR <2 this AM  Continue argatroban gtt  Will recheck in AM and adjust based on results  -Goal INR is for while bridging to Coumadin with argatroban and once achieved, discontinue argatroban drip and recheck INR in 4-6 hours to ensure therapeutic levels  - Medical management with statin, Plavix  - Medical management and glucose control per SLIM    - Continue oral pain regimen, adjusted to long acting which appears to be controlling patient's pain well   - Appreciate psych input and management - addition of wellbutrin and adjustment of zoloft   - Wound care orders placed   - Will d/w Dr Remigio Blanchard

## 2022-12-06 NOTE — ASSESSMENT & PLAN NOTE
· Positive heparin antibody on 11/11/2020  · Appreciate heme Onc recommendations  · Plan for argatroban to Coumadin bridge  · INR 2 5 this morning start Coumadin 5 mg this evening

## 2022-12-06 NOTE — ASSESSMENT & PLAN NOTE
Lab Results   Component Value Date    HGBA1C 9 8 (H) 10/25/2022     Recent Labs     12/05/22  1548 12/05/22  2112 12/06/22  0719 12/06/22  1056   POCGLU 94 93 86 96     Significantly reduced regimen from home due to poor oral intake  Increase Lantus to 8 units nightly  Continue to titrate no st elevation

## 2022-12-06 NOTE — ASSESSMENT & PLAN NOTE
· Positive heparin antibody on 11/11/2020  · Appreciate heme Onc recommendations  · Plan for argatroban to Coumadin bridge

## 2022-12-06 NOTE — ASSESSMENT & PLAN NOTE
· Initially admitted for nonhealing wound of the left lower extremity, requiring multiple procedures ultimately required left AKA on 11/23/2022 and right AKA on 12/1/22  · Postoperative wound and pain management per primary service  · Eventual short-term rehab placement  · PT/OT discussed concerns about patient's participation with therapy   Recommended timing pain medication prior to therapy   · Appreciate psychiatry recommendations

## 2022-12-06 NOTE — PHYSICAL THERAPY NOTE
Physical Therapy Treatment Note     12/06/22 1255   PT Last Visit   PT Visit Date 12/06/22   Note Type   Note Type Treatment   Pain Assessment   Pain Assessment Tool 0-10   Pain Score 5   Pain Location/Orientation Orientation: Right;Location: Leg   Restrictions/Precautions   Weight Bearing Precautions Per Order Yes   RLE Weight Bearing Per Order Other  (AKA)   LLE Weight Bearing Per Order Other  (AKA)   Other Precautions Cognitive; Bed Alarm;WBS;Fall Risk;Pain;Multiple lines;Telemetry   General   Chart Reviewed Yes   Family/Caregiver Present No   Subjective   Subjective Pt  in bed upon entry  Agreeable reluctantly to PT   Bed Mobility   Additional Comments Long sitting very minimally in bed with use of bedrails and dependent   Assessment   Prognosis Guarded   Problem List Decreased strength;Decreased endurance;Decreased mobility; Impaired balance;Decreased cognition; Impaired judgement;Pain;Decreased skin integrity;Obesity   Assessment Pt  given extensive education in the importance of mobility  Pt  also given muh encouragement to participate in therapy session  Pt  reppeatedly said she has too much pain even when the therapist moved the table and no physical contact to the patient  Pt  noted with anxiety and fear  pt  reported she still has not come to terms with all that has happened  Pt  was agreeable to therapy and to get to EOB sitting after initial encouragement however changed her mind later  Pt  did not want to attempt bed mobility with assist  Attempted long sitting by assisting patient's UEs to hold onto the bedrail and to pull herself forward and patient declined physical assist for the same  However patient attempted to perform minimal long sitting in bed by holding and pulling onto the therapists hands  Pt, declined any LE mobility  talked to RN and patient got pain meds   Also discussed with physician regarding patient's status and suggested probably time the pain meds prior to PT to get patient to have a better participation in therapy session  Will continue to follow and encourage patient to participate in therapy to address the mobility deficits  Barriers to Discharge None   Goals   Patient Goals I do not want to do this  STG Expiration Date 12/08/22   PT Treatment Day 9   Plan   Treatment/Interventions Patient/family training;Bed mobility;Spoke to nursing;Spoke to MD;OT   Progress Slow progress, decreased activity tolerance   PT Frequency 3-5x/wk   Recommendation   PT Discharge Recommendation Post acute rehabilitation services   Equipment Recommended Other (Comment)  (andree lift)   AM-PAC Basic Mobility Inpatient   Turning in Bed Without Bedrails 1   Lying on Back to Sitting on Edge of Flat Bed 1   Moving Bed to Chair 1   Standing Up From Chair 1   Walk in Room 1   Climb 3-5 Stairs 1   Basic Mobility Inpatient Raw Score 6   Turning Head Towards Sound 4   Follow Simple Instructions 3   Low Function Basic Mobility Raw Score 13   Low Function Basic Mobility Standardized Score 20 14   Highest Level Of Mobility   JH-HLM Goal 2: Bed activities/Dependent transfer   JH-HLM Achieved 2: Bed activities/Dependent transfer   End of Consult   Patient Position at End of Consult Supine; All needs within reach;Bed/Chair alarm activated         Morgan Hamilton PTA    An AM-PAC basic mobility standardized score less than 42 9 suggest the patient may benefit from discharge to post-acute rehab services

## 2022-12-06 NOTE — PLAN OF CARE
Problem: PHYSICAL THERAPY ADULT  Goal: Performs mobility at highest level of function for planned discharge setting  See evaluation for individualized goals  Description: Treatment/Interventions: Functional transfer training, LE strengthening/ROM, Therapeutic exercise, Endurance training, Patient/family training, Equipment eval/education, Bed mobility, Compensatory technique education, Gait training, Continued evaluation, Spoke to nursing, Spoke to MD, Spoke to case management, OT          See flowsheet documentation for full assessment, interventions and recommendations  Outcome: Not Progressing  Note: Prognosis: Guarded  Problem List: Decreased strength, Decreased endurance, Decreased mobility, Impaired balance, Decreased cognition, Impaired judgement, Pain, Decreased skin integrity, Obesity  Assessment: Pt  given extensive education in the importance of mobility  Pt  also given [facility] encouragement to participate in therapy session  Pt  reppeatedly said she has too much pain even when the therapist moved the table and no physical contact to the patient  Pt  noted with anxiety and fear  pt  reported she still has not come to terms with all that has happened  Pt  was agreeable to therapy and to get to EOB sitting after initial encouragement however changed her mind later  Pt  did not want to attempt bed mobility with assist  Attempted long sitting by assisting patient's UEs to hold onto the bedrail and to pull herself forward and patient declined physical assist for the same  However patient attempted to perform minimal long sitting in bed by holding and pulling onto the therapists hands  Pt, declined any LE mobility  talked to RN and patient got pain meds  Also discussed with physician regarding patient's status and suggested probably time the pain meds prior to PT to get patient to have a better participation in therapy session   Will continue to follow and encourage patient to participate in therapy to address the mobility deficits  Barriers to Discharge: None  Barriers to Discharge Comments: alone  PT Discharge Recommendation: Post acute rehabilitation services    See flowsheet documentation for full assessment

## 2022-12-06 NOTE — ASSESSMENT & PLAN NOTE
Lab Results   Component Value Date    HGBA1C 9 8 (H) 10/25/2022     Recent Labs     12/05/22  2112 12/06/22  0719 12/06/22  1056 12/06/22  1608   POCGLU 93 86 96 119     Significantly reduced regimen from home due to poor oral intake  Continue Lantus to 8 units nightly plus sliding scale  Continue to titrate

## 2022-12-07 ENCOUNTER — VBI (OUTPATIENT)
Dept: ADMINISTRATIVE | Facility: OTHER | Age: 62
End: 2022-12-07

## 2022-12-07 PROBLEM — E44.1 MILD PROTEIN-CALORIE MALNUTRITION (HCC): Status: ACTIVE | Noted: 2022-12-07

## 2022-12-07 LAB
ANION GAP SERPL CALCULATED.3IONS-SCNC: 8 MMOL/L (ref 4–13)
APTT PPP: 118 SECONDS (ref 23–37)
APTT PPP: 62 SECONDS (ref 23–37)
APTT PPP: 63 SECONDS (ref 23–37)
APTT PPP: 92 SECONDS (ref 23–37)
BUN SERPL-MCNC: 14 MG/DL (ref 5–25)
CALCIUM SERPL-MCNC: 8 MG/DL (ref 8.3–10.1)
CHLORIDE SERPL-SCNC: 105 MMOL/L (ref 96–108)
CO2 SERPL-SCNC: 26 MMOL/L (ref 21–32)
CREAT SERPL-MCNC: 0.77 MG/DL (ref 0.6–1.3)
ERYTHROCYTE [DISTWIDTH] IN BLOOD BY AUTOMATED COUNT: 16.6 % (ref 11.6–15.1)
GFR SERPL CREATININE-BSD FRML MDRD: 83 ML/MIN/1.73SQ M
GLUCOSE SERPL-MCNC: 102 MG/DL (ref 65–140)
GLUCOSE SERPL-MCNC: 104 MG/DL (ref 65–140)
GLUCOSE SERPL-MCNC: 120 MG/DL (ref 65–140)
GLUCOSE SERPL-MCNC: 124 MG/DL (ref 65–140)
GLUCOSE SERPL-MCNC: 150 MG/DL (ref 65–140)
HCT VFR BLD AUTO: 25.6 % (ref 34.8–46.1)
HGB BLD-MCNC: 7.8 G/DL (ref 11.5–15.4)
INR PPP: 2.46 (ref 0.84–1.19)
MCH RBC QN AUTO: 25.7 PG (ref 26.8–34.3)
MCHC RBC AUTO-ENTMCNC: 30.5 G/DL (ref 31.4–37.4)
MCV RBC AUTO: 85 FL (ref 82–98)
PLATELET # BLD AUTO: 447 THOUSANDS/UL (ref 149–390)
PMV BLD AUTO: 8.3 FL (ref 8.9–12.7)
POTASSIUM SERPL-SCNC: 4.6 MMOL/L (ref 3.5–5.3)
PROTHROMBIN TIME: 26.6 SECONDS (ref 11.6–14.5)
RBC # BLD AUTO: 3.03 MILLION/UL (ref 3.81–5.12)
SODIUM SERPL-SCNC: 139 MMOL/L (ref 135–147)
WBC # BLD AUTO: 9.63 THOUSAND/UL (ref 4.31–10.16)

## 2022-12-07 PROCEDURE — 99024 POSTOP FOLLOW-UP VISIT: CPT | Performed by: SURGERY

## 2022-12-07 PROCEDURE — 80048 BASIC METABOLIC PNL TOTAL CA: CPT | Performed by: INTERNAL MEDICINE

## 2022-12-07 PROCEDURE — 82948 REAGENT STRIP/BLOOD GLUCOSE: CPT

## 2022-12-07 PROCEDURE — 85610 PROTHROMBIN TIME: CPT | Performed by: SURGERY

## 2022-12-07 PROCEDURE — 99232 SBSQ HOSP IP/OBS MODERATE 35: CPT | Performed by: INTERNAL MEDICINE

## 2022-12-07 PROCEDURE — 85730 THROMBOPLASTIN TIME PARTIAL: CPT | Performed by: SURGERY

## 2022-12-07 PROCEDURE — 85027 COMPLETE CBC AUTOMATED: CPT | Performed by: SURGERY

## 2022-12-07 RX ORDER — OXYCODONE HYDROCHLORIDE 5 MG/1
7.5 TABLET ORAL EVERY 4 HOURS PRN
Status: DISCONTINUED | OUTPATIENT
Start: 2022-12-07 | End: 2023-01-12 | Stop reason: HOSPADM

## 2022-12-07 RX ORDER — OXYCODONE HCL 10 MG/1
10 TABLET, FILM COATED, EXTENDED RELEASE ORAL EVERY 12 HOURS SCHEDULED
Status: DISCONTINUED | OUTPATIENT
Start: 2022-12-07 | End: 2023-01-12 | Stop reason: HOSPADM

## 2022-12-07 RX ORDER — OXYCODONE HYDROCHLORIDE 5 MG/1
5 TABLET ORAL EVERY 4 HOURS PRN
Status: DISCONTINUED | OUTPATIENT
Start: 2022-12-07 | End: 2023-01-12 | Stop reason: HOSPADM

## 2022-12-07 RX ORDER — LISINOPRIL 20 MG/1
40 TABLET ORAL DAILY
Status: DISCONTINUED | OUTPATIENT
Start: 2022-12-07 | End: 2023-01-12 | Stop reason: HOSPADM

## 2022-12-07 RX ORDER — WARFARIN SODIUM 5 MG/1
5 TABLET ORAL
Status: DISCONTINUED | OUTPATIENT
Start: 2022-12-07 | End: 2022-12-09

## 2022-12-07 RX ORDER — OXYCODONE HYDROCHLORIDE 5 MG/1
2.5 TABLET ORAL EVERY 4 HOURS PRN
Status: DISCONTINUED | OUTPATIENT
Start: 2022-12-07 | End: 2023-01-12 | Stop reason: HOSPADM

## 2022-12-07 RX ORDER — BUPROPION HYDROCHLORIDE 150 MG/1
150 TABLET ORAL DAILY
Status: DISCONTINUED | OUTPATIENT
Start: 2022-12-07 | End: 2023-01-12 | Stop reason: HOSPADM

## 2022-12-07 RX ORDER — LABETALOL HYDROCHLORIDE 5 MG/ML
10 INJECTION, SOLUTION INTRAVENOUS EVERY 6 HOURS PRN
Status: DISCONTINUED | OUTPATIENT
Start: 2022-12-07 | End: 2023-01-12 | Stop reason: HOSPADM

## 2022-12-07 RX ADMIN — METHOCARBAMOL 1000 MG: 500 TABLET ORAL at 22:00

## 2022-12-07 RX ADMIN — WARFARIN SODIUM 5 MG: 5 TABLET ORAL at 18:03

## 2022-12-07 RX ADMIN — OXYCODONE HYDROCHLORIDE 15 MG: 10 TABLET ORAL at 03:11

## 2022-12-07 RX ADMIN — LISINOPRIL 40 MG: 20 TABLET ORAL at 08:59

## 2022-12-07 RX ADMIN — QUETIAPINE FUMARATE 25 MG: 25 TABLET ORAL at 22:00

## 2022-12-07 RX ADMIN — INSULIN GLARGINE 8 UNITS: 100 INJECTION, SOLUTION SUBCUTANEOUS at 21:07

## 2022-12-07 RX ADMIN — POTASSIUM CHLORIDE 40 MEQ: 1500 TABLET, EXTENDED RELEASE ORAL at 08:59

## 2022-12-07 RX ADMIN — SERTRALINE HYDROCHLORIDE 175 MG: 100 TABLET ORAL at 08:59

## 2022-12-07 RX ADMIN — BUPROPION HYDROCHLORIDE 150 MG: 150 TABLET, FILM COATED, EXTENDED RELEASE ORAL at 08:59

## 2022-12-07 RX ADMIN — OXYCODONE HYDROCHLORIDE 10 MG: 10 TABLET ORAL at 16:17

## 2022-12-07 RX ADMIN — GABAPENTIN 400 MG: 400 CAPSULE ORAL at 20:47

## 2022-12-07 RX ADMIN — METHOCARBAMOL 1000 MG: 500 TABLET ORAL at 05:38

## 2022-12-07 RX ADMIN — SENNOSIDES 8.6 MG: 8.6 TABLET, FILM COATED ORAL at 18:03

## 2022-12-07 RX ADMIN — POLYETHYLENE GLYCOL 3350 17 G: 17 POWDER, FOR SOLUTION ORAL at 08:59

## 2022-12-07 RX ADMIN — DOXAZOSIN 2 MG: 2 TABLET ORAL at 08:59

## 2022-12-07 RX ADMIN — OXYCODONE HYDROCHLORIDE 10 MG: 10 TABLET ORAL at 09:05

## 2022-12-07 RX ADMIN — GABAPENTIN 400 MG: 400 CAPSULE ORAL at 09:05

## 2022-12-07 RX ADMIN — METOPROLOL SUCCINATE 25 MG: 25 TABLET, EXTENDED RELEASE ORAL at 08:59

## 2022-12-07 RX ADMIN — ACETAMINOPHEN 975 MG: 325 TABLET, FILM COATED ORAL at 18:03

## 2022-12-07 RX ADMIN — PANTOPRAZOLE SODIUM 40 MG: 40 TABLET, DELAYED RELEASE ORAL at 05:38

## 2022-12-07 RX ADMIN — ACETAMINOPHEN 975 MG: 325 TABLET, FILM COATED ORAL at 13:52

## 2022-12-07 RX ADMIN — GABAPENTIN 400 MG: 400 CAPSULE ORAL at 16:17

## 2022-12-07 RX ADMIN — CLOPIDOGREL BISULFATE 75 MG: 75 TABLET ORAL at 08:59

## 2022-12-07 RX ADMIN — POLYETHYLENE GLYCOL 3350 17 G: 17 POWDER, FOR SOLUTION ORAL at 20:47

## 2022-12-07 RX ADMIN — ARGATROBAN 0.1 MCG/KG/MIN: 50 INJECTION, SOLUTION INTRAVENOUS at 20:42

## 2022-12-07 RX ADMIN — OXYCODONE HYDROCHLORIDE 10 MG: 10 TABLET, FILM COATED, EXTENDED RELEASE ORAL at 20:47

## 2022-12-07 RX ADMIN — ACETAMINOPHEN 975 MG: 325 TABLET, FILM COATED ORAL at 05:38

## 2022-12-07 RX ADMIN — METHOCARBAMOL 1000 MG: 500 TABLET ORAL at 13:52

## 2022-12-07 RX ADMIN — ATORVASTATIN CALCIUM 40 MG: 40 TABLET, FILM COATED ORAL at 16:17

## 2022-12-07 RX ADMIN — AMLODIPINE BESYLATE 10 MG: 10 TABLET ORAL at 08:59

## 2022-12-07 RX ADMIN — NYSTATIN: 100000 POWDER TOPICAL at 09:00

## 2022-12-07 RX ADMIN — SENNOSIDES 8.6 MG: 8.6 TABLET, FILM COATED ORAL at 08:59

## 2022-12-07 NOTE — PROGRESS NOTES
[facility]  Progress Note - Maribeth Stauffer 1960, 65 y o  female MRN: [id number]  Unit/Bed#: E4 -01 Encounter: [id number]  Primary Care Provider: ALEX Delatorre Si   Date and time admitted to hospital: 10/21/2022  7:58 PM    * PAD (peripheral artery disease) [facility])  Assessment & Plan  65year old female former smoker w/HTN, HLD, uncontrolled type II DM (A1c 9 8), hx CVA, presenting with nonhealing extensive L heel ulceration and R hallux and 5th digit ulceration  Vascular surgery consulted for input  S/p multiple b/l endovascular interventions  JUSTIN  R hemispheric CVA    S/p L AKA 11/23/22 [facility])  S/p R AKA, wound debridement 12/1/22 [facility])      Recommendations:  - Bilateral tissue loss in the setting of uncontrolled type II DM and NYDIA on admission, now s/p multiple angiograms w/intervention    - Endovascular interventions, c/b atheroembolic event to the RLE and JUSTIN with R hemispheric CVA  - Now s/p L AKA on 11/23/22 and R AKA 12/1/22 w/ proximal thigh wound debridements  - Will change dressing today and assess wound    - Wound care and plastic surgery consult pending for recommendations regarding proximal thigh wounds   - continue argatroban drip per protocol will plan to bridge to Coumadin  Heme Onc note noted  -Will monitor today to ensure therapeutic on argatroban gtt in steady state and if this is the case start bridge coumadin tomorrow   -Goal INR is for while bridging to Coumadin with argatroban and once achieved, discontinue argatroban drip and recheck INR in 4-6 hours to ensure therapeutic levels  - Medical management with statin, Plavix  - Medical management and glucose control per SLIM  - Continue oral pain regimen -anesthesia to assess for possible nerve block  -Appreciate psych input and management - addition of wellbutrin and adjustment of zoloft     Subjective:     No acute events overnight   Sleeping comfortably this morning, did not disturb  WBC 9 6 from 12 6  Hemoglobin 7 8 from 8 3  Platelet 021 from 623  INR yesterday 6 19    Vitals:  /86 (BP Location: Left arm)   Pulse 99   Temp 98 8 °F (37 1 °C) (Temporal)   Resp 18   Ht 5' 4" (1 626 m)   Wt 78 9 kg (173 lb 15 1 oz)   SpO2 95%   BMI 29 86 kg/m²     I/Os:  I/O last 3 completed shifts: In: 540 [P O :540]  Out: 425 [Urine:425]  No intake/output data recorded  Lab Results and Cultures:   CBC with diff:   Lab Results   Component Value Date    WBC 9 63 12/07/2022    HGB 7 8 (L) 12/07/2022    HCT 25 6 (L) 12/07/2022    MCV 85 12/07/2022     (H) 12/07/2022    MCH 25 7 (L) 12/07/2022    MCHC 30 5 (L) 12/07/2022    RDW 16 6 (H) 12/07/2022    MPV 8 3 (L) 12/07/2022    NRBC 0 12/05/2022   ,   BMP/CMP:  Lab Results   Component Value Date    SODIUM 141 12/05/2022    K 4 7 12/05/2022     12/05/2022    CO2 26 12/05/2022    BUN 9 12/05/2022    CREATININE 0 72 12/05/2022    CALCIUM 8 3 12/05/2022    AST 25 12/02/2022    ALT 12 12/02/2022    ALKPHOS 318 (H) 12/02/2022    EGFR 90 12/05/2022   ,   Lipid Panel: No results found for: CHOL,   Coags:   Lab Results   Component Value Date    PTT 92 (H) 12/07/2022    INR 6 19 (HH) 12/06/2022   ,     Blood Culture:   Lab Results   Component Value Date    BLOODCX No Growth After 5 Days   11/13/2022    BLOODCX Staphylococcus coagulase negative (A) 11/13/2022   ,   Urinalysis:   Lab Results   Component Value Date    COLORU Yellow 11/12/2022    CLARITYU Slightly Cloudy 11/12/2022    SPECGRAV 1 025 11/12/2022    PHUR 5 5 11/12/2022    PHUR 6 5 02/23/2018    LEUKOCYTESUR Trace (A) 11/12/2022    NITRITE Positive (A) 11/12/2022    GLUCOSEU Negative 11/12/2022    KETONESU Negative 11/12/2022    BILIRUBINUR Negative 11/12/2022    BLOODU Large (A) 11/12/2022   ,   Urine Culture:   Lab Results   Component Value Date    URINECX >100,000 cfu/ml Enterobacter cloacae complex (A) 11/12/2022    URINECX 70,000-79,000 cfu/ml Kluyveromyces marxianus (A) 11/12/2022   ,   Wound Culure: No results found for: WOUNDCULT    Medications:  Current Facility-Administered Medications   Medication Dose Route Frequency   • acetaminophen (TYLENOL) tablet 975 mg  975 mg Oral Q6H [address]   • ALPRAZolam (XANAX) tablet 0 25 mg  0 25 mg Oral Daily PRN   • amLODIPine (NORVASC) tablet 10 mg  10 mg Oral Daily   • argatroban infusion (premix)  0 1-3 mcg/kg/min Intravenous Titrated   • atorvastatin (LIPITOR) tablet 40 mg  40 mg Oral Daily With Dinner   • buPROPion (WELLBUTRIN XL) 24 hr tablet 150 mg  150 mg Oral Daily   • clopidogrel (PLAVIX) tablet 75 mg  75 mg Oral Daily   • doxazosin (CARDURA) tablet 2 mg  2 mg Oral Daily   • gabapentin (NEURONTIN) capsule 400 mg  400 mg Oral TID   • HYDROmorphone HCl (DILAUDID) injection 0 2 mg  0 2 mg Intravenous Q3H PRN   • insulin glargine (LANTUS) subcutaneous injection 8 Units 0 08 mL  8 Units Subcutaneous HS   • insulin lispro (HumaLOG) 100 units/mL subcutaneous injection 1-6 Units  1-6 Units Subcutaneous TID AC   • methocarbamol (ROBAXIN) tablet 1,000 mg  1,000 mg Oral Q8H [address]   • metoclopramide (REGLAN) injection 10 mg  10 mg Intravenous Q6H PRN   • metoprolol succinate (TOPROL-XL) 24 hr tablet 25 mg  25 mg Oral Daily   • naloxone (NARCAN) 0 04 mg/mL syringe 0 04 mg  0 04 mg Intravenous Q1MIN PRN   • nystatin (MYCOSTATIN) powder   Topical BID   • ondansetron (ZOFRAN) injection 4 mg  4 mg Intravenous Q6H PRN   • oxyCODONE (ROXICODONE) IR tablet 5 mg  5 mg Oral Q3H PRN    Or   • oxyCODONE (ROXICODONE) immediate release tablet 10 mg  10 mg Oral Q3H PRN    Or   • oxyCODONE (ROXICODONE) IR tablet 15 mg  15 mg Oral Q3H PRN   • pantoprazole (PROTONIX) EC tablet 40 mg  40 mg Oral Early Morning   • polyethylene glycol (MIRALAX) packet 17 g  17 g Oral BID   • potassium chloride (K-DUR,KLOR-CON) CR tablet 40 mEq  40 mEq Oral BID   • QUEtiapine (SEROquel) tablet 25 mg  25 mg Oral HS   • senna (SENOKOT) tablet 8 6 mg  1 tablet Oral BID   • sertraline (ZOLOFT) tablet 175 mg  175 mg Oral Daily       Physical Exam:    General: No acute distress  CV: Normal rate  Respiratory: Non-labored breathing  Abdominal: Soft  Extremities: Warm  Neurologic: Alert    Wound/Incision:  Left AKA stump site with staples in place, clean dry and intact without surrounding erythema  Right AKA stump site with dressing in place, clean dry and intact             Randolph Wilson MD  12/7/2022

## 2022-12-07 NOTE — PLAN OF CARE
Problem: Potential for Falls  Goal: Patient will remain free of falls  Description: INTERVENTIONS:  - Educate patient/family on patient safety including physical limitations  - Instruct patient to call for assistance with activity   - Consult OT/PT to assist with strengthening/mobility   - Keep Call bell within reach  - Keep bed low and locked with side rails adjusted as appropriate  - Keep care items and personal belongings within reach  - Initiate and maintain comfort rounds  - Make Fall Risk Sign visible to staff  - Offer Toileting every 2 Hours, in advance of need  - Initiate/Maintain bed  chair alarm  - Obtain necessary fall risk management equipment: bed chair alarm, call bell, gripper sock, walker, bedside commode  - Apply yellow socks and bracelet for high fall risk patients  - Consider moving patient to room near nurses station  Outcome: Progressing     Problem: MOBILITY - ADULT  Goal: Maintain or return to baseline ADL function  Description: INTERVENTIONS:  -  Assess patient's ability to carry out ADLs; assess patient's baseline for ADL function and identify physical deficits which impact ability to perform ADLs (bathing, care of mouth/teeth, toileting, grooming, dressing, etc )  - Assess/evaluate cause of self-care deficits   - Assess range of motion  - Assess patient's mobility; develop plan if impaired  - Assess patient's need for assistive devices and provide as appropriate  - Encourage maximum independence but intervene and supervise when necessary  - Involve family in performance of ADLs  - Assess for home care needs following discharge   - Consider OT consult to assist with ADL evaluation and planning for discharge  - Provide patient education as appropriate  Outcome: Progressing  Goal: Maintains/Returns to pre admission functional level  Description: INTERVENTIONS:  - Perform BMAT or MOVE assessment daily    - Set and communicate daily mobility goal to care team and patient/family/caregiver     - Collaborate with rehabilitation services on mobility goals if consulted  - Assist patient in repositioning every 2 hours    - Dangle patient 3 times a day  - Stand patient 3 times a day  - Out of bed to chair as tolerated  - Out of bed for meals  - Out of bed for toileting  - Record patient progress and toleration of activity level   Outcome: Progressing     Problem: PAIN - ADULT  Goal: Verbalizes/displays adequate comfort level or baseline comfort level  Description: Interventions:  - Encourage patient to monitor pain and request assistance  - Assess pain using appropriate pain scale  - Administer analgesics based on type and severity of pain and evaluate response  - Implement non-pharmacological measures as appropriate and evaluate response  - Consider cultural and social influences on pain and pain management  - Notify physician/advanced practitioner if interventions unsuccessful or patient reports new pain  Outcome: Progressing     Problem: INFECTION - ADULT  Goal: Absence or prevention of progression during hospitalization  Description: INTERVENTIONS:  - Assess and monitor for signs and symptoms of infection  - Monitor lab/diagnostic results  - Monitor all insertion sites, i e  indwelling lines, tubes, and drains  - Administer medications as ordered  - Instruct and encourage patient and family to use good hand hygiene technique  Outcome: Progressing     Problem: SAFETY ADULT  Goal: Patient will remain free of falls  Description: INTERVENTIONS:  - Educate patient/family on patient safety including physical limitations  - Instruct patient to call for assistance with activity   - Consult OT/PT to assist with strengthening/mobility   - Keep Call bell within reach  - Keep bed low and locked with side rails adjusted as appropriate  - Keep care items and personal belongings within reach  - Initiate and maintain comfort rounds  - Make Fall Risk Sign visible to staff  - Offer Toileting every 2 Hours, in advance of need  - Initiate/Maintain bed  chair alarm  - Obtain necessary fall risk management equipment: bed chair alarm, call bell, gripper sock, walker, bedside commode  - Apply yellow socks and bracelet for high fall risk patients  - Consider moving patient to room near nurses station  Outcome: Progressing  Goal: Maintain or return to baseline ADL function  Description: INTERVENTIONS:  -  Assess patient's ability to carry out ADLs; assess patient's baseline for ADL function and identify physical deficits which impact ability to perform ADLs (bathing, care of mouth/teeth, toileting, grooming, dressing, etc )  - Assess/evaluate cause of self-care deficits   - Assess range of motion  - Assess patient's mobility; develop plan if impaired  - Assess patient's need for assistive devices and provide as appropriate  - Encourage maximum independence but intervene and supervise when necessary  - Involve family in performance of ADLs  - Assess for home care needs following discharge   - Consider OT consult to assist with ADL evaluation and planning for discharge  - Provide patient education as appropriate  Outcome: Progressing  Goal: Maintains/Returns to pre admission functional level  Description: INTERVENTIONS:  - Perform BMAT or MOVE assessment daily    - Set and communicate daily mobility goal to care team and patient/family/caregiver  - Collaborate with rehabilitation services on mobility goals if consulted  - Assist patient in repositioning every 2 hours    - Dangle patient 3 times a day  - Stand patient 3 times a day  - Out of bed to chair as tolerated  - Out of bed for meals  - Out of bed for toileting  - Record patient progress and toleration of activity level   Outcome: Progressing     Problem: DISCHARGE PLANNING  Goal: Discharge to home or other facility with appropriate resources  Description: INTERVENTIONS:  - Identify barriers to discharge w/patient   - Arrange for needed discharge resources and transportation as appropriate  - Identify discharge learning needs  - Refer to Case Management Department for coordinating discharge planning if the patient needs post-hospital services based on physician/advanced practitioner order   Outcome: Progressing     Problem: Knowledge Deficit  Goal: Patient/family/caregiver demonstrates understanding of disease process, treatment plan, medications, and discharge instructions  Description: Complete learning assessment and assess knowledge base    Interventions:  - Provide teaching at level of understanding  - Provide teaching via preferred learning methods  Outcome: Progressing     Problem: METABOLIC, FLUID AND ELECTROLYTES - ADULT  Goal: Electrolytes maintained within normal limits  Description: INTERVENTIONS:  - Monitor labs and assess patient for signs and symptoms of electrolyte imbalances  - Administer electrolyte replacement as ordered  - Monitor response to electrolyte replacements, including repeat lab results as appropriate  - Instruct patient on fluid and nutrition as appropriate  Outcome: Progressing  Goal: Fluid balance maintained  Description: INTERVENTIONS:  - Monitor labs   - Monitor I/O and WT  - Instruct patient on fluid and nutrition as appropriate  - Assess for signs & symptoms of volume excess or deficit  Outcome: Progressing  Goal: Glucose maintained within target range  Description: INTERVENTIONS:  - Monitor Blood Glucose as ordered  - Assess for signs and symptoms of hyperglycemia and hypoglycemia  - Administer ordered medications to maintain glucose within target range  - Assess nutritional intake and initiate nutrition service referral as needed  Outcome: Progressing     Problem: SKIN/TISSUE INTEGRITY - ADULT  Goal: Skin Integrity remains intact(Skin Breakdown Prevention)  Description: Assess:  -Perform Nicanor assessment every shift  -Clean and moisturize skin every shift  -Inspect skin when repositioning, toileting, and assisting with ADLS  -Assess under medical devices   -Assess extremities for adequate circulation and sensation     Bed Management:  -Have minimal linens on bed & keep smooth, unwrinkled  -Change linens as needed when moist or perspiring  -Avoid sitting or lying in one position for more than 2 hours while in bed    Toileting:  -Offer bedside commode  -Assess for incontinence every 2 hours  -Use incontinent care products after each incontinent episode     Activity:  -Mobilize patient 3 times a day  -Encourage or provide ROM exercises   -Turn and reposition patient every 2 Hours  -Use appropriate equipment to lift or move patient in bed  -Instruct/ Assist with weight shifting every 15 minutes when out of bed in chair  -Consider limitation of chair time 1 hour intervals    Skin Care:  -Avoid use of baby powder, tape, friction and shearing, hot water or constrictive clothing  -Relieve pressure over bony prominences using waffle cushion when in chair  -Do not massage red bony areas    Outcome: Progressing  Goal: Incision(s), wounds(s) or drain site(s) healing without S/S of infection  Description: INTERVENTIONS  - Assess and document dressing, incision, wound bed, drain sites and surrounding tissue  - Provide patient and family education  - Perform skin care/dressing changes everyday as ordered  Outcome: Progressing  Goal: Pressure injury heals and does not worsen  Description: Interventions:  - Implement low air loss mattress or specialty surface (Criteria met)  - Apply silicone foam dressing  - Instruct/assist with weight shifting every 15 minutes when in chair   - Limit chair time to 1 hour intervals  - Use special pressure reducing interventions such as waffle cushion when in chair   - Perform passive or active ROM   - Turn and reposition patient & offload bony prominences every 2 hours   - Utilize friction reducing device or surface for transfers   - Consider consults to  interdisciplinary teams such as wound care, PT/OT  - Use incontinent care products after each incontinent episode   - Consider nutrition services referral as needed  Outcome: Progressing     Problem: MUSCULOSKELETAL - ADULT  Goal: Maintain or return mobility to safest level of function  Description: INTERVENTIONS:  - Assess patient's ability to carry out ADLs; assess patient's baseline for ADL function and identify physical deficits which impact ability to perform ADLs (bathing, care of mouth/teeth, toileting, grooming, dressing, etc )  - Assess/evaluate cause of self-care deficits   - Assess range of motion  - Assess patient's mobility  - Assess patient's need for assistive devices and provide as appropriate  - Encourage maximum independence but intervene and supervise when necessary  - Involve family in performance of ADLs  - Assess for home care needs following discharge   - Consider OT consult to assist with ADL evaluation and planning for discharge  - Provide patient education as appropriate  Outcome: Progressing  Goal: Maintain proper alignment of affected body part  Description: INTERVENTIONS:  - Support, maintain and protect limb and body alignment  - Provide patient/ family with appropriate education  Outcome: Progressing     Problem: CARDIOVASCULAR - ADULT  Goal: Maintains optimal cardiac output and hemodynamic stability  Description: INTERVENTIONS:  - Monitor I/O, vital signs and rhythm  - Monitor for S/S and trends of decreased cardiac output  - Administer and titrate ordered vasoactive medications to optimize hemodynamic stability  - Assess quality of pulses, skin color and temperature  - Assess for signs of decreased coronary artery perfusion  - Instruct patient to report change in severity of symptoms  Outcome: Progressing     Problem: RESPIRATORY - ADULT  Goal: Achieves optimal ventilation and oxygenation  Description: INTERVENTIONS:  - Assess for changes in respiratory status  - Assess for changes in mentation and behavior  - Position to facilitate oxygenation and minimize respiratory effort  - Oxygen administered by appropriate delivery if ordered  - Initiate smoking cessation education as indicated  - Encourage broncho-pulmonary hygiene including cough, deep breathe, Incentive Spirometry  - Assess the need for suctioning and aspirate as needed  - Assess and instruct to report SOB or any respiratory difficulty  - Respiratory Therapy support as indicated  Outcome: Progressing     Problem: GENITOURINARY - ADULT  Goal: Maintains or returns to baseline urinary function  Description: INTERVENTIONS:  - Assess urinary function  - Encourage oral fluids to ensure adequate hydration if ordered  - Administer IV fluids as ordered to ensure adequate hydration  - Administer ordered medications as needed  - Offer frequent toileting  - Follow urinary retention protocol if ordered  Outcome: Progressing  Goal: Absence of urinary retention  Description: INTERVENTIONS:  - Assess patient’s ability to void and empty bladder  - Monitor I/O  - Bladder scan as needed  - Discuss with physician/AP medications to alleviate retention as needed  - Discuss catheterization for long term situations as appropriate  Outcome: Progressing  Goal: Urinary catheter remains patent  Description: INTERVENTIONS:  - Assess patency of urinary catheter  - If patient has a chronic pa, consider changing catheter if non-functioning  - Follow guidelines for intermittent irrigation of non-functioning urinary catheter  Outcome: Progressing     Problem: Prexisting or High Potential for Compromised Skin Integrity  Goal: Skin integrity is maintained or improved  Description: INTERVENTIONS:  - Identify patients at risk for skin breakdown  - Assess and monitor skin integrity  - Assess and monitor nutrition and hydration status  - Monitor labs   - Assess for incontinence   - Turn and reposition patient  - Assist with mobility/ambulation  - Relieve pressure over bony prominences  - Avoid friction and shearing  - Provide appropriate hygiene as needed including keeping skin clean and dry  - Evaluate need for skin moisturizer/barrier cream  - Collaborate with interdisciplinary team   - Patient/family teaching  - Consider wound care consult   Outcome: Progressing     Problem: Nutrition/Hydration-ADULT  Goal: Nutrient/Hydration intake appropriate for improving, restoring or maintaining nutritional needs  Description: Monitor and assess patient's nutrition/hydration status for malnutrition  Collaborate with interdisciplinary team and initiate plan and interventions as ordered  Monitor patient's weight and dietary intake as ordered or per policy  Utilize nutrition screening tool and intervene as necessary  Determine patient's food preferences and provide high-protein, high-caloric foods as appropriate       INTERVENTIONS:  - Monitor oral intake, urinary output, labs, and treatment plans  - Assess nutrition and hydration status and recommend course of action  - Evaluate amount of meals eaten  - Assist patient with eating if necessary   - Allow adequate time for meals  - Recommend/ encourage appropriate diets, oral nutritional supplements, and vitamin/mineral supplements  - Order, calculate, and assess calorie counts as needed  - Recommend, monitor, and adjust tube feedings and TPN/PPN based on assessed needs  - Assess need for intravenous fluids  - Provide specific nutrition/hydration education as appropriate  - Include patient/family/caregiver in decisions related to nutrition  Outcome: Progressing

## 2022-12-07 NOTE — NUTRITION
12/06/22 1101   Biochemical Data,Medical Tests, and Procedures   Biochemical Data/Medical Tests/Procedures Lab values reviewed; Meds reviewed   Labs (Comment) 12/6 H&H 8 3/26 8   Meds (Comment) reviewed   Nutrition-Focused Physical Exam   Nutrition-Focused Physical Exam Findings Edema;RN skin assessment reviewed   Nutrition-Focused Physical Exam Findings edema RLE +2, S/p R AKA  on 12/1 and s/p L AKA  On 11/23/22 with wound   Adequacy of Intake   Nutrition Modality PO   Feeding Route   PO Independent   Current PO Intake   Current Diet Order CCD2   Nutrition Supplements Glucerna  (TID)   Current Meal Intake 25-50%;0-25%   Estimated Calorie Intake 0-25%   Estimated Protein Intake  0-25%   Estimated Fluid Intake 0-25%   Estimated calorie intake compared to estimated need PT states she is eating a small amount more,however well below nutritional needs,  again explained the importance of calorie/protein intake needs  Encouraged supplements, increasing po intake, PT agreeing to try other flavors of Glucerna   PES Statement   Problem Continue previous diagnosis   Oral or Nutritional Support Intake (2) Inadequate oral intake NI-2 1   Related to Inadequate intake   As evidenced by: Per patient/family interview   Recommendations/Interventions   Malnutrition/BMI Present Yes   Adult Malnutrition type Acute illness   Adult Degree of Malnutrition Malnutrition of mild degree   Malnutrition Characteristics Inadequate energy;Muscle loss   360 Statement mild protein calorie malnutrition with increased protein/calorie needs/wound healing, poor po intake <75% energy intake compared to estimated energy needs for > 5 days, muscle wasting/mild depletion/temples, treated with diet and supplements     Summary PT has once again agreed to try Glucerna supplement, variey flavors   Interventions/Recommendations Continue current diet order;Supplement adjust   Intervention Comments PT did not care for [employer]/PT, agreeing to try various flavors of Glucerna, stressed the importance of adequeate protein/calorie intake/wound healing, may need to consider short term enteral feedings if no significant improvement within the nxet 24-48 hours   Recommendations to Provider May need to consider short term enteral feedings to meet current nutritional needs, if no signficiant improvement in po intake within nex 24-48 hours, please discuss with PT   Education Assessment   Education Education initiated/ completed   Education Notes Discussed importance of good glucose control, informed PT of good numbers   Also discussed/reinforced need to meet protein/calorie needs / wound healing and prevention   Patient Nutrition Goals   Goal Increase kcal/PRO intake;Meet PO needs   Goal Status Extended   Timeframe to complete goal by next f/u   Nutrition Complexity Risk   Nutrition complexity level High risk   Follow up date 12/10/22

## 2022-12-07 NOTE — MALNUTRITION/BMI
This medical record reflects one or more clinical indicators suggestive of malnutrition  Malnutrition Findings:   Adult Malnutrition type: Acute illness  Adult Degree of Malnutrition: Malnutrition of mild degree  Malnutrition Characteristics: Inadequate energy, Muscle loss                  360 Statement: mild protein calorie malnutrition with increased protein/calorie needs/wound healing, poor po intake <75% energy intake compared to estimated energy needs for > 5 days, muscle wasting/mild depletion/temples, treated with diet and supplements  BMI Findings:  Adult BMI Classifications: Morbid Obesity 40-44 9        Body mass index is 29 86 kg/m²  See Nutrition note dated 12/6/22  for additional details  Completed nutrition assessment is viewable in the nutrition documentation

## 2022-12-07 NOTE — CASE MANAGEMENT
Case Management Discharge Planning Note    Patient name Jason Sarmiento  Location [address] [address] [address] [facility]-* MRN [id number]  : 1960 Date 2022       Current Admission Date: 10/21/2022  Current Admission Diagnosis:PAD (peripheral artery disease) [facility])   Patient Active Problem List    Diagnosis Date Noted   • Diarrhea 2022   • CVA (cerebral vascular accident) ([address] ) 2022   • Febrile 2022   • HIT (heparin-induced thrombocytopenia) 2022   • Diabetic ulcer of heel associated with diabetes mellitus due to underlying condition ([address] ) 11/10/2022   • Acute on chronic anemia 10/30/2022   • Generalized edema due to fluid overload 10/27/2022   • PAD (peripheral artery disease) ([address] ) 10/25/2022   • Non healing left heel wound 10/22/2022   • Hypertensive urgency 10/22/2022   • Hypokalemia 10/22/2022   • Wound of lower extremity 10/21/2022   • Epigastric pain 2022   • Type 2 diabetes mellitus with hyperglycemia, with long-term current use of insulin ([address] ) 10/18/2021   • Hyperparathyroidism ([address] ) 10/18/2021   • 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 ([address] ) 2021   • 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   • Moderate protein-calorie malnutrition ([address] ) 2020   • Acute colitis 2020   • Arthritis 2019   • Depression, recurrent ([address] ) 2018   • Class 3 severe obesity due to excess calories with serious comorbidity and body mass index (BMI) of 40 0 to 44 9 in adult ([address] ) 2018   • Acute respiratory failure with hypoxia ([address] ) 2018   • Esophageal reflux 2018   • Uncontrolled type 2 diabetes with neuropathy 2018   • Diabetic peripheral neuropathy ([address] )    • Systolic murmur    • Stroke ([facility] ) 2015   • Anxiety 2015   • Vitamin D deficiency 2015   • Benign essential hypertension 2012   • Familial combined hyperlipidemia 2012      LOS (days): 46  Geometric Mean LOS (GMLOS) (days):   Days to GMLOS:     OBJECTIVE:  Risk of Unplanned Readmission Score: 33 46         Current admission status: Inpatient   Preferred Pharmacy:   [id number] [address], [address]  [facility]  [facility] [address]  Phone: [phone] Fax: [phone]    Primary Care Provider: ALEX Marie    Primary Insurance: BLUE CROSS  Secondary Insurance:     DISCHARGE DETAILS:    Discharge planning discussed with[de-identified] Pt's dtr  Freedom of Choice: Yes  Comments - Freedom of Choice: Dtr would like referral sent to [facility] for STR to LTC  CM contacted family/caregiver?: Yes  Were Treatment Team discharge recommendations reviewed with patient/caregiver?: Yes  Did patient/caregiver verbalize understanding of patient care needs?: N/A- going to facility  Were patient/caregiver advised of the risks associated with not following Treatment Team discharge recommendations?: Yes    Contacts  Patient Contacts: Yahir Medina (dtr)  Relationship to Patient[de-identified] Family  Contact Method: Phone  Phone Number: [phone]  Reason/Outcome: Emergency Contact, Discharge Planning, Referral                        Treatment Team Recommendation: Short Term Rehab  Discharge Destination Plan[de-identified] Short Term Rehab                                         Additional Comments: CM recieved call from pt dtr, Melinda Bailon to get an update on pt's d/c plan  CM explained to Melinda Bailon that pt is experiencing some depressive episodes & that we are having psych re-eval her to possible med manage her  CM also explained to dtr that we will have to bridge her back on to coumidin which can take several days to complete  Dtr understood  Dtr requested referral be sent to [facility] for possible placement as it offers LTC option & its close to home  [facility] & will [facility]tr updated  CM to continue to follow pt care & d/c

## 2022-12-07 NOTE — PROGRESS NOTES
[address]20 [facility]  Progress Note - 5211 [facility] [address] 1960, 65 y o  female MRN: [id number]  Unit/Bed#: E4 -01 Encounter: [id number]  Primary Care Provider: ALEX Caceres   Date and time admitted to hospital: 10/21/2022  7:58 PM    * PAD (peripheral artery disease) [facility])  Assessment & Plan  · Initially admitted for nonhealing wound of the left lower extremity, requiring multiple procedures ultimately required left AKA on 11/23/2022 and right AKA on 12/1/22  · Postoperative wound and pain management per primary service  · Eventual short-term rehab placement  · PT/OT discussed concerns about patient's participation with therapy  Recommended timing pain medication prior to therapy   · Appreciate psychiatry recommendations    HIT (heparin-induced thrombocytopenia)  Assessment & Plan  · Positive heparin antibody on 11/11/2020  · Appreciate heme Onc recommendations  · Plan for argatroban to Coumadin bridge  · INR 2 5 this morning start Coumadin 5 mg this evening    Acute on chronic anemia  Assessment & Plan  Hemoglobin stable    Hypokalemia  Assessment & Plan  Replete  Recent Labs     12/04/22  0907 12/05/22  0546   K 4 5 4 7     Recheck potassium today as I would like to resume patient's lisinopril  Discontinue scheduled Kdur    Type 2 diabetes mellitus with hyperglycemia, with long-term current use of insulin [facility])  Assessment & Plan  Lab Results   Component Value Date    HGBA1C 9 8 (H) 10/25/2022     Recent Labs     12/05/22  2112 12/06/22  0719 12/06/22  1056 12/06/22  1608   POCGLU 93 86 96 119     Significantly reduced regimen from home due to poor oral intake  Continue Lantus to 8 units nightly plus sliding scale  Continue to titrate      Stroke [facility])  Assessment & Plan  · Noted to have change in mental status on 11/13/2022  · CT confirmed moderate right parietal lobe stroke, likely embolic in the setting of HIT  · Heme Onc recommends Coumadin    Currently on argatroban bridge to Coumadin    Benign essential hypertension  Assessment & Plan  Blood pressure elevated  Continue Norvasc 10 mg daily and Cardura 2 mg daily, metoprolol  Resume lisinopril    Depression, recurrent (HCC)  Assessment & Plan  Evaluated by Psychiatry x 2 on this admission  · Continue medications recommended by psychiatry    Acute respiratory failure with hypoxia [facility])  Assessment & Plan  Multifactorial secondary to atelectasis, hypoventilation and sedentary  Resolved  VTE Pharmacologic Prophylaxis: Argatroban, Coumadin    Patient Centered Rounds:  Patient care rounds were performed with nursing    Time Spent for Care: 30  More than 50% of total time spent on counseling and coordination of care as described above  Current Length of Stay: 46 day(s)    Current Patient Status: Inpatient   Certification Statement: The patient will continue to require additional inpatient hospital stay due to argatroban bridge, awaiting placement to rehab    Discharge Plan: Per primary team    Code Status: Level 1 - Full Code      Subjective:   Patient seen and evaluated at bedside  She feels better today but is still having significant pain  Objective:     Vitals:   Temp (24hrs), Av 4 °F (36 9 °C), Min:97 8 °F (36 6 °C), Max:98 8 °F (37 1 °C)    Temp:  [97 8 °F (36 6 °C)-98 8 °F (37 1 °C)] 98 6 °F (37 °C)  HR:  [73-99] 73  Resp:  [18] 18  BP: (143-176)/(70-86) 168/70  SpO2:  [95 %-96 %] 96 %  Body mass index is 29 86 kg/m²  Input and Output Summary (last 24 hours): Intake/Output Summary (Last 24 hours) at 2022 1622  Last data filed at 2022 1521  Gross per 24 hour   Intake --   Output 1700 ml   Net -1700 ml       Physical Exam:     Physical Exam  Vitals reviewed  Constitutional:       General: She is not in acute distress  Appearance: She is well-developed  She is not ill-appearing, toxic-appearing or diaphoretic  HENT:      Head: Normocephalic and atraumatic        Mouth/Throat:      Mouth: Mucous membranes are moist    Eyes:      General: No scleral icterus  Extraocular Movements: Extraocular movements intact  Cardiovascular:      Rate and Rhythm: Normal rate and regular rhythm  Heart sounds: Normal heart sounds  Pulmonary:      Effort: Pulmonary effort is normal  No respiratory distress  Breath sounds: Normal breath sounds  No wheezing or rales  Abdominal:      General: There is no distension  Palpations: Abdomen is soft  Tenderness: There is no abdominal tenderness  There is no guarding or rebound  Musculoskeletal:      Comments: Bilateral AKA   Skin:     General: Skin is warm and dry  Neurological:      General: No focal deficit present  Mental Status: She is alert  Psychiatric:      Comments: Depressed affect         Additional Data:     Labs: I have reviewed pertinent results     Results from last 7 days   Lab Units 12/07/22  0620 12/06/22  0507 12/05/22  0546   WBC Thousand/uL 9 63   < > 13 27*   HEMOGLOBIN g/dL 7 8*   < > 8 5*   HEMATOCRIT % 25 6*   < > 27 8*   PLATELETS Thousands/uL 447*   < > 447*   NEUTROS PCT %  --   --  85*   LYMPHS PCT %  --   --  6*   MONOS PCT %  --   --  6   EOS PCT %  --   --  2    < > = values in this interval not displayed  Results from last 7 days   Lab Units 12/05/22  0546 12/04/22  0907 12/02/22  1024   SODIUM mmol/L 141   < >  --    POTASSIUM mmol/L 4 7   < >  --    CHLORIDE mmol/L 106   < >  --    CO2 mmol/L 26   < >  --    BUN mg/dL 9   < >  --    CREATININE mg/dL 0 72   < >  --    ANION GAP mmol/L 9   < >  --    CALCIUM mg/dL 8 3   < >  --    ALBUMIN g/dL  --   --  1 0*   TOTAL BILIRUBIN mg/dL  --   --  0 43   ALK PHOS U/L  --   --  318*   ALT U/L  --   --  12   AST U/L  --   --  25   GLUCOSE RANDOM mg/dL 114   < >  --     < > = values in this interval not displayed       Results from last 7 days   Lab Units 12/07/22  0620   INR  2 46*     Results from last 7 days   Lab Units 12/07/22  1521 12/07/22  1131 12/07/22  0736 12/06/22  2050 12/06/22  1608 12/06/22  1056 12/06/22  0719 12/05/22  2112 12/05/22  1548 12/05/22  1119 12/05/22  0735 12/04/22  2114   POC GLUCOSE mg/dl 104 124 102 153* 119 96 86 93 94 115 99 109                 Imaging: I have reviewed pertinent imaging       Recent Cultures (last 7 days):           Last 24 Hours Medication List:   Current Facility-Administered Medications   Medication Dose Route Frequency Provider Last Rate   • acetaminophen  975 mg Oral Q6H [address] Jac Maria MD     • ALPRAZolam  0 25 mg Oral Daily PRN Merl Knee, PA-C     • amLODIPine  10 mg Oral Daily Merl Knee, PA-C     • argatroban  0 1-3 mcg/kg/min Intravenous Titrated Merl Knee, PA-C 0 1 mcg/kg/min (12/07/22 4533)   • atorvastatin  40 mg Oral Daily With Dinner Merl Knee, PA-C     • buPROPion  150 mg Oral Daily Prabhakar Noel MD     • clopidogrel  75 mg Oral Daily Merl Knee, PA-C     • doxazosin  2 mg Oral Daily Merl Knee, PA-C     • gabapentin  400 mg Oral TID Jac Maria MD     • HYDROmorphone  0 2 mg Intravenous Q3H PRN Jac Maria MD     • insulin glargine  8 Units Subcutaneous HS Koko Miteleuterio, DO     • insulin lispro  1-6 Units Subcutaneous TID [facility] Merl Knee, PA-C     • labetalol  10 mg Intravenous Q6H PRN Koko Sadler, DO     • lisinopril  40 mg Oral Daily Koko Sadler, DO     • methocarbamol  1,000 mg Oral [facility] Jac Maria MD     • metoclopramide  10 mg Intravenous Q6H PRN Merl Knee, PA-C     • metoprolol succinate  25 mg Oral Daily Koko Miteleuterio, DO     • naloxone  0 04 mg Intravenous Q1MIN PRN Merl Knee, PA-C     • nystatin   Topical BID Merl Knee, PA-C     • ondansetron  4 mg Intravenous Q6H PRN Merl Knee, PA-C     • oxyCODONE  5 mg Oral Q3H PRN Ricarda Jamison MD      Or   • oxyCODONE  10 mg Oral Q3H PRN Ricarda Jaimson MD      Or   • oxyCODONE  15 mg Oral Q3H PRN Ricarda Jamison MD     • pantoprazole  40 mg Oral Early Morning Jonah Andrews PA-C     • polyethylene glycol  17 g Oral BID Jonah Andrews PA-C     • QUEtiapine  25 mg Oral HS Jonah Andrews PA-C     • senna  1 tablet Oral BID Jonah Andrews PA-C     • sertraline  175 mg Oral Daily Kerri Pacheco MD     • warfarin  5 mg Oral Daily (warfarin) Paula Clemente DO          Today, Patient Was Seen By: Paula Clemente DO    ** Please Note: Dictation voice to text software may have been used in the creation of this document   **

## 2022-12-07 NOTE — UTILIZATION REVIEW
Continued Stay Review    Date: 12/6/2022                         Current Patient Class:  Inpatient   Current Level of Care:  Med surg     HPI:62 y o  female initially admitted on 10/22/22 with CVA presenting with bilateral CLTI (nonhealing L heel ulcer, R hallux and 5th digit ulceration underwent multiple endovascular interventions listed below followed by L AKA for non-salvageable foot, R hemispheric stroke and JUSTIN  Assessment/Plan: 12/6/22:No acute events overnight  WBC 12 6  Hg  8 3  Platlets 422  INR on 12/6 6 19 - Pt on Argatroban drip with plan to bridge to Coumadin  Goal INR is for bridging to Coumadin  S/p R AKA  on 12/1 and s/p L AKA  On 11/23/22 with wound debridements  Left AKA stump with staples in place, CDI without surrounding erythema  Right AKA stump site with dressing in place CDI  Pt c/o continued pain with inadequate control on current regimen  12/7 - No acute events overnight  R AKA dressing changed it was found to be heavily soiled  Anesthesia  to assess for possible nerve block- continue oral pain regimen     INR 2 46, Hg 7 8    Vital Signs:   Date/Time Temp Pulse Resp BP MAP (mmHg) SpO2 O2 Device Patient Position - Orthostatic VS   12/07/22 0737 97 8 °F (36 6 °C) 80 18 176/84 Abnormal  -- 96 % None (Room air) Lying   12/06/22 2356 98 8 °F (37 1 °C) 99 18 143/86 105 95 % None (Room air) Lying   12/06/22 1456 97 5 °F (36 4 °C) 87 18 170/71 -- 92 % None (Room air) Lying   12/06/22 0721 97 5 °F (36 4 °C) 82 18 170/77 -- 94 % None (Room air) Lying   12/05/22 2216 98 9 °F (37 2 °C) 92 19 165/75 -- 94 % None (Room air) Lying   12/05/22 1940 -- -- -- -- -- -- None (Room air) --   12/05/22 1548 97 2 °F (36 2 °C) Abnormal  90 18 179/74 Abnormal  107 93 % None (Room air) Lying   12/05/22 0915 -- -- -- -- -- -- None (Room air) --   12/05/22 0754 97 6 °F (36 4 °C) 88 18 116/69 -- 92 % None (Room air) Lying     Pertinent Labs/Diagnostic Results:       Results from last 7 days   Lab Units 12/07/22  0620 12/06/22  0507 12/05/22  0546 12/04/22  0907 12/03/22  0518 12/02/22  1024 12/02/22  0556 12/01/22  1441 12/01/22  0519   WBC Thousand/uL 9 63 12 56* 13 27* 13 58*  13 58* 17 81*  --  16 83*  --  11 60*   HEMOGLOBIN g/dL 7 8* 8 3* 8 5* 7 5* 7 0*   < > 8 4*   < > 6 8*   HEMATOCRIT % 25 6* 26 8* 27 8* 25 1* 23 0*   < > 27 5*   < > 22 4*   PLATELETS Thousands/uL 447* 422* 447* 404* 404*  --  372  --  326   NEUTROS ABS Thousands/µL  --   --  11 13*  --   --   --  14 66*  --  9 67*    < > = values in this interval not displayed           Results from last 7 days   Lab Units 12/05/22  0546 12/04/22  0907 12/02/22  0556 12/01/22  0519 11/30/22  1927   SODIUM mmol/L 141 140 139 140 138   POTASSIUM mmol/L 4 7 4 5 4 4 3 4* 3 1*   CHLORIDE mmol/L 106 105 106 104 101   CO2 mmol/L 26 26 23 31 31   ANION GAP mmol/L 9 9 10 5 6   BUN mg/dL 9 8 9 7 8   CREATININE mg/dL 0 72 0 67 0 65 0 70 0 73   EGFR ml/min/1 73sq m 90 95 96 93 89   CALCIUM mg/dL 8 3 7 6* 7 9* 7 4* 7 6*     Results from last 7 days   Lab Units 12/02/22  1024   AST U/L 25   ALT U/L 12   ALK PHOS U/L 318*   TOTAL PROTEIN g/dL 5 5*   ALBUMIN g/dL 1 0*   TOTAL BILIRUBIN mg/dL 0 43   BILIRUBIN DIRECT mg/dL 0 21*     Results from last 7 days   Lab Units 12/07/22  0736 12/06/22  2050 12/06/22  1608 12/06/22  1056 12/06/22  0719 12/05/22  2112 12/05/22  1548 12/05/22  1119 12/05/22  0735 12/04/22  2114 12/04/22  1656 12/04/22  1208   POC GLUCOSE mg/dl 102 153* 119 96 86 93 94 115 99 109 105 100     Results from last 7 days   Lab Units 12/05/22  0546 12/04/22  0907 12/02/22  0556 12/01/22  0519 11/30/22  1927   GLUCOSE RANDOM mg/dL 114 116 121 103 113       Results from last 7 days   Lab Units 12/07/22  0620 12/07/22  0231 12/06/22  1902 12/06/22  0708 12/06/22  0507 12/05/22  2050 12/05/22  1646 12/02/22  1630 12/02/22  1024   PROTIME seconds 26 6*  --   --   --  54 3*  --  >120 0*   < > 27 7*   INR  2 46*  --   --   --  6 19*  --   --   --  2 61*   PTT seconds 118* 92* 76*   < > 116*   < > >210*   < > 70*    < > = values in this interval not displayed  Medications:   Scheduled Medications:  acetaminophen, 975 mg, Oral, Q6H MICHELLE  amLODIPine, 10 mg, Oral, Daily  atorvastatin, 40 mg, Oral, Daily With Dinner  buPROPion, 150 mg, Oral, Daily - started 12/7  clopidogrel, 75 mg, Oral, Daily  doxazosin, 2 mg, Oral, Daily  gabapentin, 400 mg, Oral, TID  insulin glargine, 8 Units, Subcutaneous, HS  insulin lispro, 1-6 Units, Subcutaneous, TID AC  lisinopril, 40 mg, Oral, Daily  methocarbamol, 1,000 mg, Oral, Q8H MICHELLE  metoprolol succinate, 25 mg, Oral, Daily  nystatin, , Topical, BID  pantoprazole, 40 mg, Oral, Early Morning  polyethylene glycol, 17 g, Oral, BID  potassium chloride, 40 mEq, Oral, BID  QUEtiapine, 25 mg, Oral, HS  senna, 1 tablet, Oral, BID  sertraline, 175 mg, Oral, Daily - dose increased  From 150 mg on 12/7      Continuous IV Infusions:  argatroban, 0 1-3 mcg/kg/min, Intravenous, Titrated      PRN Meds:  ALPRAZolam, 0 25 mg, Oral, Daily PRN  HYDROmorphone, 0 2 mg, Intravenous, Q3H PRN  labetalol, 10 mg, Intravenous, Q6H PRN  metoclopramide, 10 mg, Intravenous, Q6H PRN  naloxone, 0 04 mg, Intravenous, Q1MIN PRN  ondansetron, 4 mg, Intravenous, Q6H PRN  oxyCODONE, 5 mg, Oral, Q3H PRN   Or  oxyCODONE, 10 mg, Oral, Q3H PRN   Or  oxyCODONE, 15 mg, Oral, Q3H PRN-12/5 x 4 - 12/6 x 4 - 12/7 x 1    Nursing Orders -  VS - incentive spirometry - I & O q shift - Fall precautions - Daily weights - up with assistance - Diet Cons carb with nutritional supplements    Discharge Plan:  [facility]     Network Utilization Review Department  ATTENTION: Please call with any questions or concerns to [phone] and carefully listen to the prompts so that you are directed to the right person   All voicemails are confidential   Mozella Fuss all requests for admission clinical reviews, approved or denied determinations and any other requests to dedicated fax number below belonging to the [facility] where the patient is receiving treatment   List of dedicated fax numbers for the Facilities:  [address] [place] [address] DENIALS (Administrative/Medical Necessity) [phone]   [address]  (Maternity/NICU/Pediatrics) [phone]   [address] [phone]   Charlotte Zimmerman [address] [phone]   [address] [place] [address] [facility] [phone]   [facility] 134 [address] [facility] [phone]

## 2022-12-07 NOTE — NURSING NOTE
Pts dressing on right sided aka was found to be heavily soiled  Reached out to surgery and was told to redress w/ an ABD, kerlex and ace wrap  Pt would not tolerate the wrapping of the ace bandage  Was covered w/ ABD's, sterile 4x4's, and kerlex

## 2022-12-08 ENCOUNTER — DOCUMENTATION (OUTPATIENT)
Dept: VASCULAR SURGERY | Facility: CLINIC | Age: 62
End: 2022-12-08

## 2022-12-08 LAB
ANION GAP SERPL CALCULATED.3IONS-SCNC: 8 MMOL/L (ref 4–13)
APTT PPP: 100 SECONDS (ref 23–37)
APTT PPP: 129 SECONDS (ref 23–37)
APTT PPP: 57 SECONDS (ref 23–37)
APTT PPP: 62 SECONDS (ref 23–37)
APTT PPP: 98 SECONDS (ref 23–37)
BUN SERPL-MCNC: 12 MG/DL (ref 5–25)
CALCIUM SERPL-MCNC: 7 MG/DL (ref 8.3–10.1)
CHLORIDE SERPL-SCNC: 103 MMOL/L (ref 96–108)
CO2 SERPL-SCNC: 27 MMOL/L (ref 21–32)
CREAT SERPL-MCNC: 0.75 MG/DL (ref 0.6–1.3)
ERYTHROCYTE [DISTWIDTH] IN BLOOD BY AUTOMATED COUNT: 16.8 % (ref 11.6–15.1)
GFR SERPL CREATININE-BSD FRML MDRD: 86 ML/MIN/1.73SQ M
GLUCOSE SERPL-MCNC: 136 MG/DL (ref 65–140)
GLUCOSE SERPL-MCNC: 138 MG/DL (ref 65–140)
GLUCOSE SERPL-MCNC: 150 MG/DL (ref 65–140)
GLUCOSE SERPL-MCNC: 154 MG/DL (ref 65–140)
GLUCOSE SERPL-MCNC: 155 MG/DL (ref 65–140)
HCT VFR BLD AUTO: 25.4 % (ref 34.8–46.1)
HGB BLD-MCNC: 7.7 G/DL (ref 11.5–15.4)
INR PPP: 1.73 (ref 0.84–1.19)
MCH RBC QN AUTO: 25.8 PG (ref 26.8–34.3)
MCHC RBC AUTO-ENTMCNC: 30.3 G/DL (ref 31.4–37.4)
MCV RBC AUTO: 85 FL (ref 82–98)
PLATELET # BLD AUTO: 465 THOUSANDS/UL (ref 149–390)
PMV BLD AUTO: 8.1 FL (ref 8.9–12.7)
POTASSIUM SERPL-SCNC: 4.7 MMOL/L (ref 3.5–5.3)
PROTHROMBIN TIME: 20.2 SECONDS (ref 11.6–14.5)
RBC # BLD AUTO: 2.99 MILLION/UL (ref 3.81–5.12)
SODIUM SERPL-SCNC: 138 MMOL/L (ref 135–147)
WBC # BLD AUTO: 12.3 THOUSAND/UL (ref 4.31–10.16)

## 2022-12-08 PROCEDURE — 85610 PROTHROMBIN TIME: CPT | Performed by: SURGERY

## 2022-12-08 PROCEDURE — 99024 POSTOP FOLLOW-UP VISIT: CPT | Performed by: SURGERY

## 2022-12-08 PROCEDURE — 85730 THROMBOPLASTIN TIME PARTIAL: CPT | Performed by: SURGERY

## 2022-12-08 PROCEDURE — 85730 THROMBOPLASTIN TIME PARTIAL: CPT | Performed by: INTERNAL MEDICINE

## 2022-12-08 PROCEDURE — 88307 TISSUE EXAM BY PATHOLOGIST: CPT | Performed by: PATHOLOGY

## 2022-12-08 PROCEDURE — 99232 SBSQ HOSP IP/OBS MODERATE 35: CPT | Performed by: NURSE PRACTITIONER

## 2022-12-08 PROCEDURE — 88311 DECALCIFY TISSUE: CPT | Performed by: PATHOLOGY

## 2022-12-08 PROCEDURE — 99232 SBSQ HOSP IP/OBS MODERATE 35: CPT | Performed by: INTERNAL MEDICINE

## 2022-12-08 PROCEDURE — 80048 BASIC METABOLIC PNL TOTAL CA: CPT | Performed by: INTERNAL MEDICINE

## 2022-12-08 PROCEDURE — 85027 COMPLETE CBC AUTOMATED: CPT | Performed by: SURGERY

## 2022-12-08 PROCEDURE — NC001 PR NO CHARGE: Performed by: SURGERY

## 2022-12-08 PROCEDURE — 82948 REAGENT STRIP/BLOOD GLUCOSE: CPT

## 2022-12-08 RX ADMIN — OXYCODONE HYDROCHLORIDE 10 MG: 10 TABLET, FILM COATED, EXTENDED RELEASE ORAL at 21:35

## 2022-12-08 RX ADMIN — COLLAGENASE SANTYL: 250 OINTMENT TOPICAL at 09:14

## 2022-12-08 RX ADMIN — BUPROPION HYDROCHLORIDE 150 MG: 150 TABLET, FILM COATED, EXTENDED RELEASE ORAL at 09:11

## 2022-12-08 RX ADMIN — INSULIN LISPRO 1 UNITS: 100 INJECTION, SOLUTION INTRAVENOUS; SUBCUTANEOUS at 16:48

## 2022-12-08 RX ADMIN — SERTRALINE HYDROCHLORIDE 175 MG: 100 TABLET ORAL at 09:10

## 2022-12-08 RX ADMIN — ACETAMINOPHEN 975 MG: 325 TABLET, FILM COATED ORAL at 17:57

## 2022-12-08 RX ADMIN — GABAPENTIN 400 MG: 400 CAPSULE ORAL at 21:35

## 2022-12-08 RX ADMIN — DOXAZOSIN 2 MG: 2 TABLET ORAL at 09:11

## 2022-12-08 RX ADMIN — CLOPIDOGREL BISULFATE 75 MG: 75 TABLET ORAL at 09:11

## 2022-12-08 RX ADMIN — NYSTATIN: 100000 POWDER TOPICAL at 09:14

## 2022-12-08 RX ADMIN — WARFARIN SODIUM 5 MG: 5 TABLET ORAL at 17:57

## 2022-12-08 RX ADMIN — METHOCARBAMOL 1000 MG: 500 TABLET ORAL at 05:06

## 2022-12-08 RX ADMIN — ACETAMINOPHEN 975 MG: 325 TABLET, FILM COATED ORAL at 05:06

## 2022-12-08 RX ADMIN — LISINOPRIL 40 MG: 20 TABLET ORAL at 09:10

## 2022-12-08 RX ADMIN — GABAPENTIN 400 MG: 400 CAPSULE ORAL at 16:45

## 2022-12-08 RX ADMIN — ATORVASTATIN CALCIUM 40 MG: 40 TABLET, FILM COATED ORAL at 16:45

## 2022-12-08 RX ADMIN — QUETIAPINE FUMARATE 25 MG: 25 TABLET ORAL at 21:35

## 2022-12-08 RX ADMIN — METHOCARBAMOL 1000 MG: 500 TABLET ORAL at 21:35

## 2022-12-08 RX ADMIN — AMLODIPINE BESYLATE 10 MG: 10 TABLET ORAL at 09:09

## 2022-12-08 RX ADMIN — OXYCODONE HYDROCHLORIDE 10 MG: 10 TABLET, FILM COATED, EXTENDED RELEASE ORAL at 09:10

## 2022-12-08 RX ADMIN — ACETAMINOPHEN 975 MG: 325 TABLET, FILM COATED ORAL at 11:19

## 2022-12-08 RX ADMIN — PANTOPRAZOLE SODIUM 40 MG: 40 TABLET, DELAYED RELEASE ORAL at 05:06

## 2022-12-08 RX ADMIN — METOPROLOL SUCCINATE 25 MG: 25 TABLET, EXTENDED RELEASE ORAL at 09:10

## 2022-12-08 RX ADMIN — INSULIN GLARGINE 8 UNITS: 100 INJECTION, SOLUTION SUBCUTANEOUS at 21:35

## 2022-12-08 RX ADMIN — GABAPENTIN 400 MG: 400 CAPSULE ORAL at 09:11

## 2022-12-08 RX ADMIN — SENNOSIDES 8.6 MG: 8.6 TABLET, FILM COATED ORAL at 17:57

## 2022-12-08 NOTE — ASSESSMENT & PLAN NOTE
Replete  Recent Labs     12/07/22  1800 12/08/22  0514   K 4 6 4 7     Repleted  Monitor potassium with addition of ACE inhibitor

## 2022-12-08 NOTE — PROGRESS NOTES
Vascular Nurse Navigator Post Op Education    Met with patient at bedside to introduce myself as Vascular Nurse Navigator and explained my role  Patient is appropriate and accepting to education  Patient was educated with Review of written materials provided, Teachback, Explanation, Demonstration and Question & Answer on expectations of post op care and recovery on bilateral AKAs  Patient is a former smoker, quit 21 years ago, as such Smoking effects on the lungs, tobacco triggers and Smoking cessation was reviewed  Education provided to patient on infection prevention, activity limitations, when to call the office, importance of follow up, and incisional care  Discharge instruction handout provided to patient to review  Provided patient with information on [facility] and a list of prosthetic providers

## 2022-12-08 NOTE — PROGRESS NOTES
[facility]  Progress Note - [facility] 1960, 65 y o  female MRN: [id number]  Unit/Bed#: E4 -01 Encounter: [id number]  Primary Care Provider: ALEX Ventura   Date and time admitted to hospital: 10/21/2022  7:58 PM    * PAD (peripheral artery disease) [facility])  Assessment & Plan  65year old female former smoker w/HTN, HLD, uncontrolled type II DM (A1c 9 8), hx CVA, presenting with nonhealing extensive L heel ulceration and R hallux and 5th digit ulceration  Vascular surgery consulted for input  S/p multiple b/l endovascular interventions  JUSTIN  R hemispheric CVA    S/p L AKA 11/23/22 [facility])  S/p R AKA, wound debridement 12/1/22 [facility])      Recommendations:  - Bilateral tissue loss in the setting of uncontrolled type II DM and NYDIA on admission, now s/p multiple angiograms w/intervention    - Endovascular interventions, c/b atheroembolic event to the RLE and JUSTIN with R hemispheric CVA  - Now s/p L AKA on 11/23/22 and R AKA 12/1/22 w/ proximal thigh wound debridements  - Plastic surgery input appreciated  Recommending daily santyl to R thigh wounds and possible future wound VAC to areas  - Continue argatroban drip per protocol will plan to bridge to Coumadin  Heme Onc note noted  -INR <2 this AM  Continue argatroban gtt  Will recheck in AM and adjust based on results  -Goal INR is for while bridging to Coumadin with argatroban and once achieved, discontinue argatroban drip and recheck INR in 4-6 hours to ensure therapeutic levels  - Medical management with statin, Plavix  - Medical management and glucose control per SLIM  - Continue oral pain regimen, adjusted to long acting which appears to be controlling patient's pain well   - Appreciate psych input and management - addition of wellbutrin and adjustment of zoloft   - Wound care orders placed   - Will d/w Dr Barry Brody    Subjective: Patient seen and examined    States that her pain has been much better controlled since yesterday  Remains hemodynamically stable  Remains on argatroban gtt  with bridge to Coumadin    Vitals:  /91 (BP Location: Left arm)   Pulse 79   Temp (!) 97 3 °F (36 3 °C) (Temporal)   Resp 20   Ht 5' 4" (1 626 m)   Wt 76 kg (167 lb 8 8 oz)   SpO2 95%   BMI 28 76 kg/m²     I/Os:  I/O last 3 completed shifts: In: 46 9 [I V :46 9]  Out: 3500 [Urine:3500]  No intake/output data recorded  Lab Results and Cultures:   CBC with diff:   Lab Results   Component Value Date    WBC 12 30 (H) 12/08/2022    HGB 7 7 (L) 12/08/2022    HCT 25 4 (L) 12/08/2022    MCV 85 12/08/2022     (H) 12/08/2022    MCH 25 8 (L) 12/08/2022    MCHC 30 3 (L) 12/08/2022    RDW 16 8 (H) 12/08/2022    MPV 8 1 (L) 12/08/2022    NRBC 0 12/05/2022   ,   BMP/CMP:  Lab Results   Component Value Date    SODIUM 138 12/08/2022    K 4 7 12/08/2022     12/08/2022    CO2 27 12/08/2022    BUN 12 12/08/2022    CREATININE 0 75 12/08/2022    CALCIUM 7 0 (L) 12/08/2022    AST 25 12/02/2022    ALT 12 12/02/2022    ALKPHOS 318 (H) 12/02/2022    EGFR 86 12/08/2022   ,   Lipid Panel: No results found for: CHOL,   Coags:   Lab Results   Component Value Date    PTT 57 (H) 12/08/2022    INR 1 73 (H) 12/08/2022   ,     Blood Culture:   Lab Results   Component Value Date    BLOODCX No Growth After 5 Days   11/13/2022    BLOODCX Staphylococcus coagulase negative (A) 11/13/2022   ,   Urinalysis:   Lab Results   Component Value Date    COLORU Yellow 11/12/2022    CLARITYU Slightly Cloudy 11/12/2022    SPECGRAV 1 025 11/12/2022    PHUR 5 5 11/12/2022    PHUR 6 5 02/23/2018    LEUKOCYTESUR Trace (A) 11/12/2022    NITRITE Positive (A) 11/12/2022    GLUCOSEU Negative 11/12/2022    KETONESU Negative 11/12/2022    BILIRUBINUR Negative 11/12/2022    BLOODU Large (A) 11/12/2022   ,   Urine Culture:   Lab Results   Component Value Date    URINECX >100,000 cfu/ml Enterobacter cloacae complex (A) 11/12/2022    URINECX 70,000-79,000 cfu/ml Renée hernandez (A) 11/12/2022   ,   Wound Culure: No results found for: WOUNDCULT    Medications:  Current Facility-Administered Medications   Medication Dose Route Frequency   • acetaminophen (TYLENOL) tablet 975 mg  975 mg Oral Q6H [facility]   • ALPRAZolam (XANAX) tablet 0 25 mg  0 25 mg Oral Daily PRN   • amLODIPine (NORVASC) tablet 10 mg  10 mg Oral Daily   • argatroban infusion (premix)  0 1-3 mcg/kg/min Intravenous Titrated   • atorvastatin (LIPITOR) tablet 40 mg  40 mg Oral Daily With Dinner   • buPROPion (WELLBUTRIN XL) 24 hr tablet 150 mg  150 mg Oral Daily   • clopidogrel (PLAVIX) tablet 75 mg  75 mg Oral Daily   • collagenase (SANTYL) ointment   Topical Daily   • doxazosin (CARDURA) tablet 2 mg  2 mg Oral Daily   • gabapentin (NEURONTIN) capsule 400 mg  400 mg Oral TID   • insulin glargine (LANTUS) subcutaneous injection 8 Units 0 08 mL  8 Units Subcutaneous HS   • insulin lispro (HumaLOG) 100 units/mL subcutaneous injection 1-6 Units  1-6 Units Subcutaneous TID AC   • labetalol (NORMODYNE) injection 10 mg  10 mg Intravenous Q6H PRN   • lisinopril (ZESTRIL) tablet 40 mg  40 mg Oral Daily   • methocarbamol (ROBAXIN) tablet 1,000 mg  1,000 mg Oral Q8H [facility]   • metoclopramide (REGLAN) injection 10 mg  10 mg Intravenous Q6H PRN   • metoprolol succinate (TOPROL-XL) 24 hr tablet 25 mg  25 mg Oral Daily   • naloxone (NARCAN) 0 04 mg/mL syringe 0 04 mg  0 04 mg Intravenous Q1MIN PRN   • nystatin (MYCOSTATIN) powder   Topical BID   • ondansetron (ZOFRAN) injection 4 mg  4 mg Intravenous Q6H PRN   • oxyCODONE (OxyCONTIN) 12 hr tablet 10 mg  10 mg Oral Q12H MICHELLE   • oxyCODONE (ROXICODONE) IR tablet 2 5 mg  2 5 mg Oral Q4H PRN    Or   • oxyCODONE (ROXICODONE) IR tablet 5 mg  5 mg Oral Q4H PRN    Or   • oxyCODONE (ROXICODONE) IR tablet 7 5 mg  7 5 mg Oral Q4H PRN   • pantoprazole (PROTONIX) EC tablet 40 mg  40 mg Oral Early Morning   • polyethylene glycol (MIRALAX) packet 17 g  17 g Oral BID   • QUEtiapine (SEROquel) tablet 25 mg  25 mg Oral HS   • senna (SENOKOT) tablet 8 6 mg  1 tablet Oral BID   • sertraline (ZOLOFT) tablet 175 mg  175 mg Oral Daily   • warfarin (COUMADIN) tablet 5 mg  5 mg Oral Daily (warfarin)       Imaging:  Reviewed  Physical Exam:    General: Alert and oriented    In no acute distress  CV: Regular rate  Respiratory: Normal effort  Abdominal: Soft, nondistended  Extremities: Status post bilateral above-knee amputations  Neurologic: Grossly normal        Jeremi Dia PA-C  12/8/2022

## 2022-12-08 NOTE — ASSESSMENT & PLAN NOTE
· Positive heparin antibody on 11/11/2020  · Appreciate heme Onc recommendations  · Plan for argatroban to Coumadin bridge  · Coumadin dosed 5 mg nightly  · Trend INR daily

## 2022-12-08 NOTE — CONSULTS
Consultation - Plastic Surgery   Gen Andre 65 y o  female MRN: [id number]  Unit/Bed#: E4 -01 Encounter: [id number]      Assessment:  Ms Gen Andre is a 61yoF with HTN, DM, HLD, PAD s/p b/l AKA with a atheroembolic ischemic wound to the R stump s/p debridement by Vascular surgery  Plan:  Wound base extending down to the superficial aspect of subcutaneous tissue without evidence of further necrosis or infection  There is some fibrinous exudate which will likely benefit from some gentle debridement  Recommend daily application of santyl to wound for enzymatic debridement with re-assessment next week  She would likely benefit from wound vac placement at some point once wound is   Ramos Anthony MD 12/8/2022 11:38 AM  Plastic Surgery Fellow        ----------------    HPI:  Gen Andre is a 65 y o  female with a complex medical history including HTN, DM, HLD, and PAD with a prolonged hospitalization for bilateral lower extremity ulcers in settings of PAD  She underwent multiple endovascular interventions  Course complicated by JUSTIN, right hemispheric CVA, and ischemic wounds due to diffuse atheroemboli  She hultimately required bilateral AKA  She had ischemic full thickness wounds just proximal to the right AKA stump requiring debridement by Vascular surgery  Plastic surfery was asked to evaluate and provide management recommendations  Inpatient consult to Plastic Surgery  Consult performed by: Gilberto Brown MD  Consult ordered by: Travis Thompson PA-C          Review of Systems    Historical Information   Past Medical History:   Diagnosis Date   • Anxiety    • Depression    • Diabetes [facility])    • Diabetes mellitus ([facility] )    • Esophageal reflux     28 APR 2015 RESOLVED   • Hyperlipidemia    • Hypertension    • Low back pain     sciatica   • Pneumonia 2019   • Stroke ([facility] ) 12/2015    small vessel, L frontal corona radiata   Rx asa 81, Lipitor 40, risk modification   • Vitamin D deficiency      Past Surgical History:   Procedure Laterality Date   • AMPUTATION ABOVE KNEE (AKA) Right 2022    Procedure: (AKA), PSB  BKA;  Surgeon: Solange Lindsay DO;  Location: [facility];  Service: Vascular   • ARTERIOGRAM Right 2022    Procedure: -Right lower extremity angiogram -Right CFA balloon angioplasty with [id number]  [employer]  -Right CFA DCB with 6x40 Bard Lutonix -Right CFA atherectomy with Jetstream and distal embolization protection with 7mm Spider FX -Right SFA/Pop angioplasty with 4x40 Chololate balloon -Right SFA/Pop DCB with 5x150 Bard Lutonix -Right SFA stent with [employer] x2 -R   • COLONOSCOPY     • IR AORTAGRAM WITH RUN-OFF  10/31/2022   • IR LOWER EXTREMITY ANGIOGRAM  11/10/2022   • IR LOWER EXTREMITY ANGIOGRAM  2022   • HI AMPUTATE THIGH,THRU FEMUR Left 2022    Procedure: (AKA);   Surgeon: Thomas Sadler DO;  Location: [facility];  Service: Vascular     Social History   Social History     Substance and Sexual Activity   Alcohol Use Never    Comment: OCCASIONAL ALCOHOL USE IN ALL SCRIPTS     Social History     Substance and Sexual Activity   Drug Use No     Social History     Tobacco Use   Smoking Status Former   • Types: Cigarettes   • Quit date:    • Years since quittin 9   Smokeless Tobacco Never     Family History:   Family History   Problem Relation Age of Onset   • Diabetes Mother    • Lung cancer Mother    • Cancer Father    • Lung cancer Father    • Hypertension Brother    • Hypertension Family        Meds/Allergies   all current active meds have been reviewed  Allergies   Allergen Reactions   • Heparin Other (See Comments)     History of HIT       Objective   First Vitals:   Blood Pressure: (!) 238/115 (10/21/22 1628)  Pulse: 93 (10/21/22 1628)  Temperature: 98 3 °F (36 8 °C) (10/21/22 1629)  Temp Source: Oral (10/21/22 162)  Respirations: 18 (10/21/22 162)  Height: 5' 3 75" (161 9 cm) (10/30/22 1300)  Weight - Scale: 98 4 kg (217 lb) (10/27/22 0558)  SpO2: 98 % (10/21/22 1628)    Current Vitals:   Blood Pressure: 170/91 (12/08/22 0755)  Pulse: 79 (12/08/22 0755)  Temperature: (!) 97 3 °F (36 3 °C) (12/08/22 0755)  Temp Source: Temporal (12/08/22 0755)  Respirations: 20 (12/08/22 0755)  Height: 5' 4" (162 6 cm) (11/14/22 0930)  Weight - Scale: 76 kg (167 lb 8 8 oz) (12/08/22 0600)  SpO2: 95 % (12/08/22 0755)      Intake/Output Summary (Last 24 hours) at 12/8/2022 1138  Last data filed at 12/8/2022 0600  Gross per 24 hour   Intake 46 87 ml   Output 2300 ml   Net -2253 13 ml       Physical Exam  HENT:      Head: Atraumatic  Eyes:      Extraocular Movements: Extraocular movements intact  Cardiovascular:      Rate and Rhythm: Normal rate and regular rhythm  Pulmonary:      Effort: Pulmonary effort is normal       Breath sounds: Normal air entry  Abdominal:      Palpations: Abdomen is soft  Musculoskeletal:      Cervical back: Neck supple  Comments: Left AKA stump intact with staples  Right AKA stump also intact with staples  On the lateral/proximal aspect of the stump, there is a full thickness wound extending down to the subcutaneous tissue, there is some fibrinous exudate through wound  No evidence of infection  Edges clean without necrosis  Right Lower Extremity: Right leg is amputated above knee  Left Lower Extremity: Left leg is amputated above knee  Neurological:      Mental Status: She is alert  Mental status is at baseline  Psychiatric:         Mood and Affect: Mood is anxious and depressed           Speech: Speech normal                   Lab Results:   CBC:   Lab Results   Component Value Date    WBC 12 30 (H) 12/08/2022    HGB 7 7 (L) 12/08/2022    HCT 25 4 (L) 12/08/2022    MCV 85 12/08/2022     (H) 12/08/2022    MCH 25 8 (L) 12/08/2022    MCHC 30 3 (L) 12/08/2022    RDW 16 8 (H) 12/08/2022    MPV 8 1 (L) 12/08/2022   , CMP:   Lab Results   Component Value Date    SODIUM 138 12/08/2022    K 4 7 12/08/2022     12/08/2022    CO2 27 12/08/2022    BUN 12 12/08/2022    CREATININE 0 75 12/08/2022    CALCIUM 7 0 (L) 12/08/2022    EGFR 86 12/08/2022

## 2022-12-08 NOTE — PROGRESS NOTES
Medical Oncology/Hematology Progress Note  Hannah Steiner, female, 65 y o , 1960,  [facility] [place] [facility] /E4 -*, [id number]     Reason for initial consultation (11/15/22): Anemia  LOS: 48 days as of 12/8/22    Assessment and Plan:  1  HIT (heparin-induced thrombocytopenia)  -Confirmed heparin-induced platelet antibody on 11/11/2022   -Started on argatroban on 11/11/22    -Recommended transitioning from argatroban gtt to Coumadin; will need to be on Coumadin for at least a month to treat HIT  Patient can to be transitioned to Arixtra a month after being diagnosed with HIT  -INR trend: 1 73 (12/8) <--2 46 (12/7) <--6 19 (12/6) given Xarelto the night before <--no available results (12/5)  Xarelto discontinued, primary team confirmed that Coumadin was the preferred anticoagulant on 12/5/22 while still being bridged from argatroban gtt  PTT trend: 57 (12/8) <--62 (12/7) <--76 (12/6) <--163 (12/6)    Argatroban standard dosing:   below 60: increase by 0 5 mcg/kg/min  60-90: no change  : decrease by 0 5 mcg/kg/min  102-121: hold infusion for 1 hour, resume infusion at 50% of previous rate  Above 121: hold infusion, notify physician, check PTT q2 hours after resuming, then [id number] as above    PT trend: 20 2 (12/8) <--26 6 (12/7) <--54 3 (12/6)    2  Acute on chronic anemia  -Anemia of chronic disease exacerbated by hospitalization and surgical interventions as outlined under   peripheral vascular disease  -Evident since admission on 10/21/22, 10 1 g/dL   No lab results in between 2/2022 and day of admission   -Hemoglobin trend: 7 7 (12/8) <--7 8 (12/7) <--8 3 (12/6) <--8 7 (12/12) <--7 6 (11/30) <--7 5 (11/28) <--9 0 (11/27) <--7 6 (11/26) <--4 8 (11/24) L AKA procedure <--6 4 (11/15)  MCV 85, MCHC 30 3  -Iron panel: iron saturation 12%, ferritin 583, most likely d/t inflammation s/p BL AKA    -s/p right CFA balloon angioplasty angiogram/atherectomy, CFA stent placement on 11/7/22, experienced some perioperative blood loss  -Period of NYDIA, was followed by Nephrology; now resolved  -Denies acute bleeding; hematuria, epistaxis, hematemesis, hematochezia  Drops in hemoglobin coincide with surgical interventions for this inpatient stay    2  Peripheral vascular disease   -Presence of non-healing left heel wound lower, s/p right CFA balloon angioplasty  -Angiogram/atherectomy, CFA stent placement on 11/7/22; experienced some perioperative blood loss  -s/p L AKA on 11/23/22  R AKA on 11/30/22   -Followed by Vascular Surgery  3  Stroke  -Multiple vascular procedures/interventions in [facility], complicated by atheroembolic events    -CT head was obtained on 11/13 precipitated by mental status change  Results showed moderate size acute/recent infarct in the right parietal lobe with local mass effect     -Etiology of infarct unclear; has no obvious focal deficits  LABS (12/8/22): Hemoglobin 7 7 g/dL  Platelets 039X WNL  WBC 12 3, elevated, s/p R AKA    PLAN:  1) Continue with titrating argatroban gtt until therapeutic level achieved, warfarin 5 mg daily started last night per primary (PTT therapeutic and steady for more than 24 hours)  PTT today at 20 2 (below 60); argatroban titrated accordingly  INR as outlined above is at 1 73  Continue dose of warfarin tonight  Goal to achieve INR between 4-5 with the combination of argatroban and warfarin for at least 5 days to ensure stability  Once argatroban gtt has been stopped, INR should be repeated in 4 - 6 hours  If INR is below therapeutic level, argatroban may be restarted  Repeat procedure daily until desired INR on warfarin alone is achieved  Vascular following as well  2) Confirmed heparin-induced platelet antibody on 11/11/2022  Stroke may be considered provoked; patient will need to be on Coumadin for at least 3 months  Will follow in the outpatient setting      3) Continue to monitor daily CBCs, PTT and INR    4) Continue transfusion support for hemoglobin <7 g/dL, or if bleeding  Hemoglobin stable at 7 7 g/dl  5) Outpatient follow up plan: Will need follow-up in the outpatient hematology office within 3 months    Communication with team:  Communicated with primary team  Reviewed this patient with Dr Wasserman Gone    Please do not hesitate to reach out for any questions or concerns  Yun Clarke, [address], CRNP  Hematology-Oncology    Interval Hx:  Patient reported feeling okay overall, "better than yesterday, although my stumps still feel tender at times " Discussed that we were still titrating her argatroban drip with the ultimate goal of having her on warfarin alone  She was looking forward to going to rehab soon, then going home  She understands that it'll be a lifestyle change for her  Denies SOB, acute bleeding  Denies n/v, headaches, diplopia  History of present illness:  Venu Flo 65 y o  female with history of insulin dependent DM , HTN, HLD, obesity, depression, initially presenting on 10/22 for non-healing LE wounds and subsequent prolonged hospitalization  She is s/p BL AKAs  She has a long-standing hx of PAD s/p multiple BLE endovascular interventions c/b atheroembolic events to BLE, now with subacute right-sided CVA on CT head  Results showed moderate size acute/recent infarct in the right parietal lobe with local mass effect  This was obtained on 11/13 precipitated by mental status change  Etiology of infarct unclear; need to r/o if cardioembolic source/septic emboli  Patient has no focal deficits on examination  She also met sepsis criteria  She developed NYDIA, now resolved  Hematology was consulted to offer recommendations regarding her anemia  Patient was also confirmed to have 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) that was first diagnosed on 11/11/2022  Heparin stopped, and was started on argatroban on the same day as managed by primary service  She is currently being bridged/transitioned from argatroban gtt to warfarin  Patient will need to be on Coumadin for at least three months; stroke suspected to be provoked  Discussed with primary team  Patient's hemoglobin stable today at 7 7, doing well with post-operative procedures  R AKA C/D/I with stitches, L AKA C/D/I with staples  Review of Systems:   Review of Systems   Constitutional: Positive for activity change and fatigue  Negative for chills and fever  HENT: Negative for ear pain and sore throat  Eyes: Negative for pain and visual disturbance  Respiratory: Negative for cough and shortness of breath  Cardiovascular: Negative for chest pain and palpitations  Gastrointestinal: Negative for abdominal pain and vomiting  Genitourinary: Negative for dysuria and hematuria  Musculoskeletal: Negative for arthralgias and back pain  BL LE pain   Skin: Positive for pallor  Negative for color change and rash  Neurological: Positive for weakness  Negative for seizures and syncope  Psychiatric/Behavioral: The patient is nervous/anxious  All other systems reviewed and are negative  Oncology History:   Cancer Staging  No matching staging information was found for the patient  Oncology History    No history exists  Past Medical History:   Past Medical History:   Diagnosis Date   • Anxiety    • Depression    • Diabetes ([facility] )    • Diabetes mellitus ([facility] )    • Esophageal reflux     28 APR 2015 RESOLVED   • Hyperlipidemia    • Hypertension    • Low back pain     sciatica   • Pneumonia 2019   • Stroke ([facility] ) 12/2015    small vessel, L frontal corona radiata   Rx asa 81, Lipitor 40, risk modification   • Vitamin D deficiency        Past Surgical History:   Procedure Laterality Date   • AMPUTATION ABOVE KNEE (AKA) Right 12/1/2022    Procedure: (AKA), PSB  BKA;  Surgeon: Eva Cooper DO;  Location: [facility];  Service: Vascular   • ARTERIOGRAM Right 11/7/2022    Procedure: -Right lower extremity angiogram -Right CFA balloon angioplasty with [id number] Joao 58 -Right CFA DCB with 6x40 Bard Lutonix -Right CFA atherectomy with Jetstream and distal embolization protection with 7mm Spider FX -Right SFA/Pop angioplasty with 4x40 Chololate balloon -Right SFA/Pop DCB with 5x150 Bard Lutonix -Right SFA stent with 6x80 W W  [employer] x2 -R   • COLONOSCOPY     • IR AORTAGRAM WITH RUN-OFF  10/31/2022   • IR LOWER EXTREMITY ANGIOGRAM  11/10/2022   • IR LOWER EXTREMITY ANGIOGRAM  2022   • UT AMPUTATE THIGH,THRU FEMUR Left 2022    Procedure: (AKA);   Surgeon: Ivan Francois DO;  Location: [facility];  Service: Vascular       Family History   Problem Relation Age of Onset   • Diabetes Mother    • Lung cancer Mother    • Cancer Father    • Lung cancer Father    • Hypertension Brother    • Hypertension Family        Social History     Socioeconomic History   • Marital status: Single     Spouse name: None   • Number of children: None   • Years of education: None   • Highest education level: None   Occupational History   • None   Tobacco Use   • Smoking status: Former     Types: Cigarettes     Quit date:      Years since quittin 9   • Smokeless tobacco: Never   Vaping Use   • Vaping Use: Never used   Substance and Sexual Activity   • Alcohol use: Never     Comment: OCCASIONAL ALCOHOL USE IN ALL SCRIPTS   • Drug use: No   • Sexual activity: None   Other Topics Concern   • None   Social History Narrative   • None     Social Determinants of Health     Financial Resource Strain: Not on file   Food Insecurity: Not on file   Transportation Needs: Not on file   Physical Activity: Not on file   Stress: Not on file   Social Connections: Not on file   Intimate Partner Violence: Not on file   Housing Stability: Low Risk    • Unable to Pay for Housing in the Last Year: No   • Number of Places Lived in the Last Year: 1   • Unstable Housing in the Last Year: No         Current Facility-Administered Medications:   •  acetaminophen (TYLENOL) tablet 975 mg, 975 mg, Oral, Q6H [facility], Balwinder Hubbard MD, 975 mg at 12/08/22 7373  •  ALPRAZolam Alonzomike Pruitt) tablet 0 25 mg, 0 25 mg, Oral, Daily PRN, Rigo Morales PA-C, 0 25 mg at 12/05/22 0989  •  amLODIPine (NORVASC) tablet 10 mg, 10 mg, Oral, Daily, Rigo Morales PA-C, 10 mg at 12/08/22 8607  •  argatroban infusion (premix), 0 1-3 mcg/kg/min, Intravenous, Titrated, Rigo Morales PA-C, Last Rate: 0 5 mL/hr at 12/07/22 2042, 0 1 mcg/kg/min at 12/07/22 2042  •  atorvastatin (LIPITOR) tablet 40 mg, 40 mg, Oral, Daily With Micaela Alcocer PA-C, 40 mg at 12/07/22 1617  •  buPROPion (WELLBUTRIN XL) 24 hr tablet 150 mg, 150 mg, Oral, Daily, Monse Crews MD, 150 mg at 12/08/22 5411  •  clopidogrel (PLAVIX) tablet 75 mg, 75 mg, Oral, Daily, Rigo Morales PA-C, 75 mg at 12/08/22 0702  •  collagenase (SANTYL) ointment, , Topical, Daily, Syl Carrion MD, Given at 12/08/22 3634  •  doxazosin (CARDURA) tablet 2 mg, 2 mg, Oral, Daily, Rigo Morales PA-C, 2 mg at 12/08/22 0857  •  gabapentin (NEURONTIN) capsule 400 mg, 400 mg, Oral, TID, Balwinder Hubbard MD, 400 mg at 12/08/22 4586  •  insulin glargine (LANTUS) subcutaneous injection 8 Units 0 08 mL, 8 Units, Subcutaneous, HS, Teresa Rodríguez DO, 8 Units at 12/07/22 2107  •  insulin lispro (HumaLOG) 100 units/mL subcutaneous injection 1-6 Units, 1-6 Units, Subcutaneous, TID AC, 1 Units at 12/03/22 0933 **AND** Fingerstick Glucose (POCT), , , TID AC, Jailyn Faustin, PA-C  •  labetalol (NORMODYNE) injection 10 mg, 10 mg, Intravenous, Q6H PRN, Hildegarde Brilliant, DO  •  lisinopril (ZESTRIL) tablet 40 mg, 40 mg, Oral, Daily, Thomasdetiffanie Brilliant, DO, 40 mg at 12/08/22 0910  •  methocarbamol (ROBAXIN) tablet 1,000 mg, 1,000 mg, Oral, Q8H [facility], Balwinder Hubbard MD, 1,000 mg at 12/08/22 2710  •  metoclopramide (REGLAN) injection 10 mg, 10 mg, Intravenous, Q6H PRN, Rigo Morales PA-C  •  metoprolol succinate (TOPROL-XL) 24 hr tablet 25 mg, 25 mg, Oral, Daily, Ambika Dumont DO, 25 mg at 12/08/22 5884  •  naloxone (NARCAN) 0 04 mg/mL syringe 0 04 mg, 0 04 mg, Intravenous, Q1MIN PRN, Ashlie Rho, PA-C  •  nystatin (MYCOSTATIN) powder, , Topical, BID, Ashlie Rho, PA-C, Given at 12/08/22 0914  •  ondansetron [facility]) injection 4 mg, 4 mg, Intravenous, Q6H PRN, Ashlie Rho, PA-C, 4 mg at 11/24/22 8849  •  oxyCODONE (OxyCONTIN) 12 hr tablet 10 mg, 10 mg, Oral, Q12H [address], Marixa Dorsey MD, 10 mg at 12/08/22 0910  •  oxyCODONE (ROXICODONE) IR tablet 2 5 mg, 2 5 mg, Oral, Q4H PRN **OR** oxyCODONE (ROXICODONE) IR tablet 5 mg, 5 mg, Oral, Q4H PRN **OR** oxyCODONE (ROXICODONE) IR tablet 7 5 mg, 7 5 mg, Oral, Q4H PRN, Marixa Dorsey MD  •  pantoprazole (PROTONIX) EC tablet 40 mg, 40 mg, Oral, Early Morning, Ashlie Rho, PA-C, 40 mg at 12/08/22 1373  •  polyethylene glycol (MIRALAX) packet 17 g, 17 g, Oral, BID, Ashlie Rho, PA-C, 17 g at 12/07/22 2047  •  QUEtiapine (SEROquel) tablet 25 mg, 25 mg, Oral, HS, Ashlie Rho, PA-C, 25 mg at 12/07/22 2200  •  senna (SENOKOT) tablet 8 6 mg, 1 tablet, Oral, BID, Ashlie Rho, PA-C, 8 6 mg at 12/07/22 1803  •  sertraline (ZOLOFT) tablet 175 mg, 175 mg, Oral, Daily, Maryann Thompson MD, 175 mg at 12/08/22 8103  •  warfarin (COUMADIN) tablet 5 mg, 5 mg, Oral, Daily (warfarin), Ambika Dumont DO, 5 mg at 12/07/22 1803    Medications Prior to Admission   Medication   • ALPRAZolam (XANAX) 0 25 mg tablet   • amLODIPine (NORVASC) 10 mg tablet   • aspirin 81 mg chewable tablet   • atorvastatin (LIPITOR) 40 mg tablet   • clotrimazole-betamethasone (LOTRISONE) 1-0 05 % cream   • fluocinonide (LIDEX) 0 05 % ointment   • gabapentin (NEURONTIN) 100 mg capsule   • insulin aspart (NovoLOG FlexPen) 100 UNIT/ML injection pen   • insulin glargine (Lantus SoloStar) 100 units/mL injection pen   • lisinopril (ZESTRIL) 40 mg tablet   • metoprolol succinate (TOPROL-XL) 25 mg 24 hr tablet   • Multiple Vitamin (multivitamin) tablet   • sertraline (ZOLOFT) 100 mg tablet   • Trulicity 2 13 LF/0 7EE SOPN   • Cholecalciferol (Vitamin D-3) 25 MCG (1000 UT) CAPS   • Continuous Blood Gluc  (FreeStyle Noé 14 Day Vidal) LITO   • Continuous Blood Gluc Sensor (FreeStyle Noé 14 Day Sensor) MISC   • Doxylamine Succinate, Sleep, (UNISOM PO)   • glucose blood (True Metrix Blood Glucose Test) test strip   • Insulin Pen Needle (B-D UF III MINI PEN NEEDLES) 31G X 5 MM MISC   • Lancets 28G MISC   • pantoprazole (PROTONIX) 40 mg tablet   • potassium chloride (K-DUR,KLOR-CON) 10 mEq tablet       Allergies   Allergen Reactions   • Heparin Other (See Comments)     History of HIT         Physical Exam:     /91 (BP Location: Left arm)   Pulse 79   Temp (!) 97 3 °F (36 3 °C) (Temporal)   Resp 20   Ht 5' 4" (1 626 m)   Wt 76 kg (167 lb 8 8 oz)   SpO2 95%   BMI 28 76 kg/m²     Physical Exam  Constitutional:       Appearance: She is obese  HENT:      Head: Normocephalic  Mouth/Throat:      Mouth: Mucous membranes are dry  Comments: edentulous  Eyes:      Conjunctiva/sclera: Conjunctivae normal    Cardiovascular:      Rate and Rhythm: Normal rate and regular rhythm  Pulmonary:      Effort: Pulmonary effort is normal       Comments: room air  Abdominal:      General: Bowel sounds are normal       Palpations: Abdomen is soft  Musculoskeletal:      Right lower leg: Edema present  Left lower leg: Edema present  Comments: R AKA incision, C/D/I with stitches  L AKA incision, C/D/I with staples   Skin:     General: Skin is warm and dry  Findings: Bruising present  Neurological:      General: No focal deficit present  Mental Status: She is alert and oriented to person, place, and time  Motor: Weakness present     Psychiatric:         Mood and Affect: Mood normal            Recent Results (from the past 48 hour(s))   APTT    Collection Time: 12/06/22  9:33 AM   Result Value Ref Range    PTT 77 (H) 23 - 37 seconds   Fingerstick Glucose (POCT)    Collection Time: 12/06/22 10:56 AM   Result Value Ref Range    POC Glucose 96 65 - 140 mg/dl   APTT    Collection Time: 12/06/22  2:08 PM   Result Value Ref Range     (HH) 23 - 37 seconds   Fingerstick Glucose (POCT)    Collection Time: 12/06/22  4:08 PM   Result Value Ref Range    POC Glucose 119 65 - 140 mg/dl   APTT    Collection Time: 12/06/22  7:02 PM   Result Value Ref Range    PTT 76 (H) 23 - 37 seconds   Fingerstick Glucose (POCT)    Collection Time: 12/06/22  8:50 PM   Result Value Ref Range    POC Glucose 153 (H) 65 - 140 mg/dl   APTT    Collection Time: 12/07/22  2:31 AM   Result Value Ref Range    PTT 92 (H) 23 - 37 seconds   Protime-INR    Collection Time: 12/07/22  6:20 AM   Result Value Ref Range    Protime 26 6 (H) 11 6 - 14 5 seconds    INR 2 46 (H) 0 84 - 1 19   CBC    Collection Time: 12/07/22  6:20 AM   Result Value Ref Range    WBC 9 63 4 31 - 10 16 Thousand/uL    RBC 3 03 (L) 3 81 - 5 12 Million/uL    Hemoglobin 7 8 (L) 11 5 - 15 4 g/dL    Hematocrit 25 6 (L) 34 8 - 46 1 %    MCV 85 82 - 98 fL    MCH 25 7 (L) 26 8 - 34 3 pg    MCHC 30 5 (L) 31 4 - 37 4 g/dL    RDW 16 6 (H) 11 6 - 15 1 %    Platelets 127 (H) 814 - 390 Thousands/uL    MPV 8 3 (L) 8 9 - 12 7 fL   APTT    Collection Time: 12/07/22  6:20 AM   Result Value Ref Range     (H) 23 - 37 seconds   Fingerstick Glucose (POCT)    Collection Time: 12/07/22  7:36 AM   Result Value Ref Range    POC Glucose 102 65 - 140 mg/dl   APTT    Collection Time: 12/07/22  9:10 AM   Result Value Ref Range    PTT 63 (H) 23 - 37 seconds   Fingerstick Glucose (POCT)    Collection Time: 12/07/22 11:31 AM   Result Value Ref Range    POC Glucose 124 65 - 140 mg/dl   APTT    Collection Time: 12/07/22  3:12 PM   Result Value Ref Range    PTT 62 (H) 23 - 37 seconds   Fingerstick Glucose (POCT)    Collection Time: 12/07/22  3:21 PM   Result Value Ref Range    POC Glucose 104 65 - 140 mg/dl   Basic metabolic panel    Collection Time: 12/07/22  6:00 PM   Result Value Ref Range    Sodium 139 135 - 147 mmol/L    Potassium 4 6 3 5 - 5 3 mmol/L    Chloride 105 96 - 108 mmol/L    CO2 26 21 - 32 mmol/L    ANION GAP 8 4 - 13 mmol/L    BUN 14 5 - 25 mg/dL    Creatinine 0 77 0 60 - 1 30 mg/dL    Glucose 120 65 - 140 mg/dL    Calcium 8 0 (L) 8 3 - 10 1 mg/dL    eGFR 83 ml/min/1 73sq m   Fingerstick Glucose (POCT)    Collection Time: 12/07/22  8:58 PM   Result Value Ref Range    POC Glucose 150 (H) 65 - 140 mg/dl   CBC    Collection Time: 12/08/22  5:14 AM   Result Value Ref Range    WBC 12 30 (H) 4 31 - 10 16 Thousand/uL    RBC 2 99 (L) 3 81 - 5 12 Million/uL    Hemoglobin 7 7 (L) 11 5 - 15 4 g/dL    Hematocrit 25 4 (L) 34 8 - 46 1 %    MCV 85 82 - 98 fL    MCH 25 8 (L) 26 8 - 34 3 pg    MCHC 30 3 (L) 31 4 - 37 4 g/dL    RDW 16 8 (H) 11 6 - 15 1 %    Platelets 398 (H) 170 - 390 Thousands/uL    MPV 8 1 (L) 8 9 - 12 7 fL   Protime-INR    Collection Time: 12/08/22  5:14 AM   Result Value Ref Range    Protime 20 2 (H) 11 6 - 14 5 seconds    INR 1 73 (H) 0 84 - 8 18   Basic metabolic panel    Collection Time: 12/08/22  5:14 AM   Result Value Ref Range    Sodium 138 135 - 147 mmol/L    Potassium 4 7 3 5 - 5 3 mmol/L    Chloride 103 96 - 108 mmol/L    CO2 27 21 - 32 mmol/L    ANION GAP 8 4 - 13 mmol/L    BUN 12 5 - 25 mg/dL    Creatinine 0 75 0 60 - 1 30 mg/dL    Glucose 154 (H) 65 - 140 mg/dL    Calcium 7 0 (L) 8 3 - 10 1 mg/dL    eGFR 86 ml/min/1 73sq m   APTT    Collection Time: 12/08/22  5:14 AM   Result Value Ref Range    PTT 57 (H) 23 - 37 seconds   Fingerstick Glucose (POCT)    Collection Time: 12/08/22  7:53 AM   Result Value Ref Range    POC Glucose 136 65 - 140 mg/dl       CTA head and neck w wo contrast    Result Date: 11/13/2022  Narrative: CTA NECK AND BRAIN WITH AND WITHOUT CONTRAST INDICATION: Stroke/TIA, determine embolic source stroke COMPARISON:   Same date CT TECHNIQUE:  Routine CT imaging of the Brain without contrast   Post contrast imaging was performed after administration of iodinated contrast through the neck and brain  Post contrast axial 0 625 mm images timed to opacify the arterial system  3D rendering was performed on an independent workstation  MIP reconstructions performed  Coronal reconstructions were performed of the noncontrast portion of the brain  Radiation dose length product (DLP) for this visit:  578 mGy-cm   This examination, like all CT scans performed in the [facility], was performed utilizing techniques to minimize radiation dose exposure, including the use of iterative reconstruction and automated exposure control  IV Contrast:  85 mL of iohexol (OMNIPAQUE)  IMAGE QUALITY:   Diagnostic FINDINGS: NONCONTRAST BRAIN PARENCHYMA:  Right parietal subacute ischemia  VENTRICLES AND EXTRA-AXIAL SPACES:  Normal for the patient's age  VISUALIZED ORBITS AND PARANASAL SINUSES:  Unremarkable  CERVICAL VASCULATURE AORTIC ARCH AND GREAT VESSELS:  Mild atherosclerotic disease of the arch, proximal great vessels and visualized subclavian vessels  No significant stenosis  RIGHT VERTEBRAL ARTERY CERVICAL SEGMENT:  Moderate stenosis at the origin  The vessel is normal in caliber throughout the neck  LEFT VERTEBRAL ARTERY CERVICAL SEGMENT:  Moderate stenosis at the origin  The vessel is normal in caliber throughout the neck  RIGHT EXTRACRANIAL CAROTID SEGMENT:  Bilateral common carotid retropharyngeal course with short segment moderate plaque stenosis  Normal bifurcation and cervical internal carotid artery  No stenosis or dissection  LEFT EXTRACRANIAL CAROTID SEGMENT:  Mild atherosclerotic disease of the distal common carotid artery and proximal cervical internal carotid artery without significant stenosis compared to the more distal ICA  NASCET criteria was used to determine the degree of internal carotid artery diameter stenosis   INTRACRANIAL VASCULATURE INTERNAL CAROTID ARTERIES:  Normal enhancement of the intracranial portions of the internal carotid arteries  Normal ophthalmic artery origins  Normal ICA terminus  ANTERIOR CIRCULATION:  Symmetric A1 segments and anterior cerebral arteries with normal enhancement  Normal anterior communicating artery  MIDDLE CEREBRAL ARTERY CIRCULATION:  M1 segment and middle cerebral artery branches demonstrate normal enhancement bilaterally  DISTAL VERTEBRAL ARTERIES:  Normal distal vertebral arteries  Posterior inferior cerebellar artery origins are normal  Normal vertebral basilar junction  BASILAR ARTERY:  Basilar artery is normal in caliber  Normal superior cerebellar arteries  POSTERIOR CEREBRAL ARTERIES: Both posterior cerebral arteries arises from the basilar tip  Both arteries demonstrate normal enhancement  Normal posterior communicating arteries  VENOUS STRUCTURES:  Normal  NON VASCULAR ANATOMY BONY STRUCTURES:  No acute osseous abnormality  SOFT TISSUES OF THE NECK:  Unremarkable  THORACIC INLET:  Small bilateral pleural effusions, partially visualized  Impression: Moderate bilateral vertebral artery origin plaque stenosis  Bilateral common carotid retropharyngeal course with short segment moderate plaque stenosis  No occlusion or dissection  Workstation performed: [id number]     XR chest portable    Result Date: 11/13/2022  Narrative: CHEST INDICATION:   sirs  COMPARISON:  Chest radiograph from November 10, 2022 EXAM PERFORMED/VIEWS:  XR CHEST PORTABLE FINDINGS: Cardiomediastinal silhouette appears unremarkable  Mild pulmonary edema  Bibasilar atelectasis  No pneumothorax  Trace layering effusions  Osseous structures appear within normal limits for patient age  Impression: Mild pulmonary edema and trace layering pleural effusions  No definite consolidation  If continued clinical concern, suggest follow-up frontal and lateral views for further evaluation   Workstation performed: [id number]     XR chest portable    Result Date: 11/10/2022  Narrative: CHEST INDICATION:   cough/wheezing  COMPARISON:  CXR 11/8/2022, abdomen CT 11/9/2022, chest CT 2/23/2018  EXAM PERFORMED/VIEWS:  XR CHEST PORTABLE FINDINGS: Cardiomediastinal silhouette appears unremarkable  Mild pulmonary venous congestion  Question trace right effusion  No pneumothorax  Osseous structures appear within normal limits for patient age  Impression: Mild pulmonary venous congestion  Question trace right effusion  Workstation performed: [id number]     XR chest portable    Result Date: 10/31/2022  Narrative: CHEST INDICATION:   new hypoxia, concern for CHF  COMPARISON:  Chest radiograph October 26, 2022 EXAM PERFORMED/VIEWS:  XR CHEST PORTABLE FINDINGS: Heart shadow is enlarged but unchanged from prior exam  Diffuse bilateral reticular opacities have increased since prior study  No definite pleural effusion  No pneumothorax  Osseous structures appear within normal limits for patient age  Impression: Pulmonary edema has increased since October 26, 2022  Workstation performed: [id number]     XR chest portable    Result Date: 10/27/2022  Narrative: CHEST INDICATION:   dyspnea  COMPARISON:  Chest radiograph dated 10/21/2022  EXAM PERFORMED/VIEWS:  XR CHEST PORTABLE FINDINGS: Cardiomediastinal silhouette appears unremarkable  Mild pulmonary vascular congestion  No pneumothorax or pleural effusion  Osseous structures appear within normal limits for patient age  Impression: Mild pulmonary vascular congestion  Workstation performed: [id number]     XR chest 1 view portable    Result Date: 10/22/2022  Narrative: CHEST INDICATION:   OREILLY  COMPARISON:  02/06/2020 EXAM PERFORMED/VIEWS:  XR CHEST PORTABLE 1 image FINDINGS: Cardiomediastinal silhouette appears unremarkable  The lungs are clear  No pneumothorax or pleural effusion  Osseous structures appear within normal limits for patient age  Impression: No acute cardiopulmonary disease   Workstation performed: [id number]     XR foot 3+ views RIGHT    Result Date: 10/22/2022  Narrative: RIGHT FOOT INDICATION:   necrotic R 5th digit  COMPARISON:  None VIEWS:  XR FOOT 3+ VW RIGHT Images: 3 FINDINGS: There is no acute fracture or dislocation  No focal areas of bony destruction  An inferior calcaneal spur is present  Spurring in the midfoot  No lytic or blastic osseous lesion  There are atherosclerotic calcifications  Soft tissue swelling about the 5th toe  Soft tissues are otherwise unremarkable  Impression: No acute osseous abnormality  Workstation performed: [id number]     CT head wo contrast    Result Date: 11/15/2022  Narrative: CT BRAIN - WITHOUT CONTRAST INDICATION:   Stroke, follow up Stroke re eval  COMPARISON:  CTA head and neck with and without contrast November 13, 2022  TECHNIQUE:  CT examination of the brain was performed  In addition to axial images, sagittal and coronal 2D reformatted images were created and submitted for interpretation  Radiation dose length product (DLP) for this visit:  1036 mGy-cm   This examination, like all CT scans performed in the [facility], was performed utilizing techniques to minimize radiation dose exposure, including the use of iterative reconstruction and automated exposure control  IMAGE QUALITY:  Diagnostic  FINDINGS: PARENCHYMA: Evolving acute infarct in right parietal-temporal-occipital lobes with questionable petechial cortical hemorrhage in right parietal lobe  No acute parenchymal hematoma  Decreased attenuation is noted in periventricular and subcortical white matter demonstrating an appearance that is statistically most likely to represent mild microangiopathic change  No intracranial mass, mass effect or midline shift  Arterial calcifications of carotid siphons  VENTRICLES AND EXTRA-AXIAL SPACES:  Normal for the patient's age   VISUALIZED ORBITS AND PARANASAL SINUSES:  Orbits are normal   Small right maxillary retention cyst  CALVARIUM AND EXTRACRANIAL SOFT TISSUES:  Normal      Impression: Evolving acute infarct in right parietal-temporal-occipital lobes with questionable petechial cortical hemorrhage in right parietal lobe  No new acute intracranial abnormality  The study was marked in [facility] for immediate notification  Workstation performed: [id number]     CT head wo contrast    Result Date: 11/13/2022  Narrative: CT BRAIN - WITHOUT CONTRAST INDICATION:   Mental status change, unknown cause change in mental status, on full anticoagulation  COMPARISON:  2/25/2018 TECHNIQUE:  CT examination of the brain was performed  In addition to axial images, sagittal and coronal 2D reformatted images were created and submitted for interpretation  Radiation dose length product (DLP) for this visit:  1838 mGy-cm   This examination, like all CT scans performed in the [facility], was performed utilizing techniques to minimize radiation dose exposure, including the use of iterative reconstruction and automated exposure control  IMAGE QUALITY:  Motion artifact limits portions of the study  Portions of the study were repeated  Some diagnostic information is obtained  FINDINGS: PARENCHYMA:  There is a moderate region of wedge-shaped hypodensity extending from the periventricular margin to the cortical surface predominantly in the right parietal lobe indicative of a moderate, acute/recent infarction  There is no large hemorrhage  There is local sulcal effacement and mass effect without subfalcine herniation  Atherosclerotic calcifications of the cavernous segment of the internal carotid artery are mild  VENTRICLES AND EXTRA-AXIAL SPACES:  Mild effacement of the occipital horn of the right lateral ventricle secondary to adjacent cytotoxic edema in the right parietal lobe  VISUALIZED ORBITS AND PARANASAL SINUSES:  Unremarkable  CALVARIUM AND EXTRACRANIAL SOFT TISSUES:  Normal      Impression: 1    Moderate-sized acute/recent infarction centered on the right parietal lobe with local mass effect  No acute intracranial hemorrhage  Workstation performed: [id number]     CTA ABDOMEN W RUN OFF W WO CONTRAST    Result Date: 11/9/2022  Narrative: CT ANGIOGRAM OF THE AORTA AND LOWER EXTREMITIES WITH IV CONTRAST INDICATION:  Chronic limb ischemia with tissue loss in both legs  Previous endovascular interventions  COMPARISON: To CTA studies and 2 arteriograms since October 27, 2022 TECHNIQUE:  CT angiogram examination of the abdomen, pelvis, and lower extremities was performed according to standard protocol with intravenous contrast   This examination, like all CT scans performed in the [facility], was performed utilizing techniques to minimize radiation dose exposure, including the use of iterative reconstruction and automated exposure control  3D reconstructions were performed an independent workstation, and are supplied for review  Rad dose 3041 mGy-cm IV Contrast:  145 mL of iohexol (OMNIPAQUE)  FINDINGS: VASCULAR STRUCTURES:   Proximal right common iliac artery stent does not look opacified on CTA  No other significant common or external iliac lesion on the right side  Right common femoral arteries mildly calcified but without focal stenosis  Common femoral bifurcation filling defect is no longer present  No clear evidence of residual dissection  Right SFA is diffusely small with multiple mild stenoses proximally  Long stent within mid and distal SFA seems patent but unable to determine any residual stenosis due to stent artifact and very small luminal caliber  Popliteal artery is patent with multiple mild stenoses throughout and diffusely small caliber  No focal flow-limiting stenosis is identified  Anterior tibial and posterior tibial arteries are intact to the ankle  Posterior tibial artery occludes proximal to the calcaneus  Dorsalis pedis artery is severely stenotic distally and possibly segmentally occluded  Peroneal artery is patent but communicating arteries are not opacified  In the left leg, there is a calcified stenosis at the origin the left common iliac difficult to quantify  It is not well seen on prior arteriograms  Based on reconstructions the lesion measures 30-40% in severity  No other common or external iliac lesion  No significant left common femoral stenosis  Left SFA is diffusely small and diseased  Recent proximal and distal stenting  Proximal left SFA stent appears patent  Distal stent is occluded  Popliteal artery is severely stenotic distal to the stent  Below-knee popliteal artery is diffusely stenotic with severe stenosis at origin of the anterior tibial artery  Anterior tibial artery is patent  Posterior tibial artery is only segmentally seen proximally and occluded distally  Peroneal artery is patent  Dorsalis pedis arteries patent  OTHER FINDINGS ABDOMEN LOWER CHEST:  Small bilateral pleural effusions  LIVER/BILIARY TREE:  Unremarkable  GALLBLADDER:  No calcified gallstones  No pericholecystic inflammatory change  SPLEEN:  Asymmetric hyperperfusion into the upper pole of uncertain significance  Not seen previously  Maybe related to phase of contrast   Splenic artery is patent  No suggestion of embolic occlusion  PANCREAS:  Unremarkable  ADRENAL GLANDS: Unremarkable  KIDNEYS/URETERS:  No solid renal mass  No hydronephrosis  No urinary tract calculi  PELVIS REPRODUCTIVE ORGANS:  Unremarkable for patient's age  URINARY BLADDER:  Jacinto in place  Nondistended bladder  ADDITIONAL ABDOMINAL AND PELVIC STRUCTURES STOMACH AND BOWEL:  Unremarkable  ABDOMINOPELVIC CAVITY:   New small volume ascites ABDOMINAL WALL/INGUINAL REGIONS:  Diffuse subcutaneous edema OSSEOUS STRUCTURES:  No acute fracture or destructive osseous lesion     Diffuse subcutaneous edema in both legs  Impression: 1  Suspect interval occlusion of recently placed proximal right common iliac artery stent 2    No residual filling defect or dissection involving right common femoral artery  Diffusely small diseased right SFA and popliteal artery without focal flow-limiting stenosis identified  Patent recently placed SFA stent  3   Intact anterior tibial artery with stenotic and/or occluded dorsalis pedis artery  Posterior tibial artery occludes at the ankle  Patent peroneal artery without opacification of anterior and posterior communicating arteries  4   Near circumferentially calcified stenosis of proximal left common iliac artery  The lesion measures 30-40% in severity by reconstructions which are compromised by artifact and software limitations and therefore difficult to exclude a focal flow-limiting stenosis within this segment  5   No significant left common femoral stenosis  SFA is diffusely diseased  Newly placed proximal stent is patent but distal SFA stent is occluded  Popliteal artery is diffusely stenotic  6   Left popliteal artery is diffusely stenotic which seems new from the prior arteriogram and may be related to acute stent occlusion  Left anterior tibial artery is intact  Posterior tibial artery occludes at the calcaneus  7   Small bilateral pleural effusions 8  Small volume ascites new from prior CT with diffuse subcutaneous edema Workstation performed: [id number]     CTA ABDOMEN W RUN OFF W WO CONTRAST    Result Date: 11/2/2022  Narrative: CT ANGIOGRAM OF THE AORTA AND LOWER EXTREMITIES WITH IV CONTRAST INDICATION:  Recent left lower extremity intervention with new decrease in ankle-brachial indices in the right lower extremity  COMPARISON: CTA, dated 10/27/2022  Fluoroscopy, dated 10/31/2022   TECHNIQUE:  CT angiogram examination of the abdomen, pelvis, and lower extremities was performed according to standard protocol with intravenous contrast   This examination, like all CT scans performed in the [facility], was performed utilizing techniques to minimize radiation dose exposure, including the use of iterative reconstruction and automated exposure control  Rad dose 2576 mGy-cm IV Contrast:  130 mL of iohexol (OMNIPAQUE)  FINDINGS: VASCULAR STRUCTURES:   ABDOMINAL VASCULAR STRUCTURES:   AORTA: The abdominal aorta is normal in caliber  There are moderate atherosclerotic calcifications  CELIAC ARTERY: The origin is normal in caliber  Branching anatomy is conventional   There is no atherosclerotic disease  SUPERIOR MESENTERIC ARTERY: Normal caliber, without atherosclerotic calcifications  INFERIOR MESENTERIC ARTERY: The ostia and visualized branches are normal  RIGHT RENAL ARTERY: The single renal artery is normal in caliber  LEFT RENAL ARTERY: The single renal artery has mild atherosclerotic calcifications at the origin  LEFT LOWER EXTREMITY: LEFT COMMON ILIAC ARTERY: Mild calcific atherosclerotic disease  The vessel is normal in caliber  LEFT INTERNAL ILIAC ARTERY: Mild atherosclerotic disease  Visualized branches are normal in caliber  LEFT EXTERNAL ILIAC ARTERY: Normal in caliber, without atherosclerotic disease  LEFT COMMON FEMORAL ARTERY: Mild calcific atherosclerotic disease  The vessel is normal in caliber  LEFT PROFUNDA FEMORIS: Mild calcific atherosclerotic disease  The vessel is normal in caliber  LEFT SUPERFICIAL FEMORAL ARTERY: Interval stenting and lithotripsy of the proximal and distal left superficial femoral artery  Peak hardening artifact limits evaluation of the intraluminal stent  Remainder of the superficial femoral artery is normal  LEFT POPLITEAL ARTERY: Moderate calcific atherosclerotic disease  The vessel is normal in caliber  LEFT TIBIOPERONEAL TRUNK: Mild calcific atherosclerotic disease  The vessel is normal in caliber  LEFT ANTERIOR TIBIAL ARTERY: Mild calcific atherosclerotic disease  The vessel is normal in caliber  LEFT PERONEAL ARTERY: Mild calcific atherosclerotic disease  The vessel is normal in caliber   LEFT POSTERIOR TIBIAL ARTERY: Mild calcific atherosclerotic disease  The vessel is normal in caliber  LEFT FOOT: Visualized branches are normal  RIGHT LOWER EXTREMITY: RIGHT COMMON ILIAC ARTERY: Interval stenting of the right common iliac artery  Beam hardening artifact limits evaluation of the intraluminal stent  RIGHT INTERNAL ILIAC ARTERY: Mild atherosclerotic disease  Visualized branches are normal in caliber  RIGHT EXTERNAL ILIAC ARTERY: Normal in caliber, without atherosclerotic disease  RIGHT COMMON FEMORAL ARTERY: Interval development of a probable flow-limiting dissection flap at the prior arteriotomy site  Moderate calcific atherosclerotic disease  The vessel is normal in caliber  Atherosclerotic disease  RIGHT PROFUNDA FEMORIS: Normal in caliber, without atherosclerotic disease  RIGHT SUPERFICIAL FEMORAL ARTERY: Focal severe atherosclerotic narrowing at the adductor canal   Otherwise moderate atherosclerotic disease throughout the remainder of the vessel  RIGHT POPLITEAL ARTERY: Moderate calcific atherosclerotic disease  The vessel is normal in caliber  RIGHT TIBIOPERONEAL TRUNK: Mild calcific atherosclerotic disease  The vessel is normal in caliber  RIGHT ANTERIOR TIBIAL ARTERY: Mild calcific atherosclerotic disease  The vessel is normal in caliber  RIGHT PERONEAL ARTERY: Mild calcific atherosclerotic disease  The vessel is normal in caliber  RIGHT POSTERIOR TIBIAL ARTERY: Mild calcific atherosclerotic disease  The vessel is normal in caliber  RIGHT FOOT: Visualized branches are normal  VENOUS: The iliocaval system is normal in caliber, without collateralization  OTHER FINDINGS ABDOMEN LIVER/BILIARY TREE:  Unremarkable  GALLBLADDER:  No calcified gallstones  No pericholecystic inflammatory change  SPLEEN:  Unremarkable  Normal size  PANCREAS:  Unremarkable  ADRENAL GLANDS: Punctate calcification in the lateral christine of the left adrenal gland, benign  Right adrenal gland is normal  KIDNEYS/URETERS:  No solid renal mass   No hydronephrosis  No urinary tract calculi  PELVIS REPRODUCTIVE ORGANS:  Unremarkable for patient's age  URINARY BLADDER:  Small amount of air in the urinary bladder likely reflects prior instrumentation  ADDITIONAL ABDOMINAL AND PELVIC STRUCTURES STOMACH AND BOWEL:  Large bowel is normal   Small bowel is normal   Stomach is normal  ABDOMINOPELVIC CAVITY:   No pathologically enlarged mesenteric or retroperitoneal lymph nodes  No ascites or free intraperitoneal air  ABDOMINAL WALL/INGUINAL REGIONS:  Diffuse anasarca  OSSEOUS STRUCTURES:  No acute fracture or destructive osseous lesion  Impression: Vascular: Left lower extremity: Interval stenting and lithotripsy of the left superficial femoral artery  Right lower extremity: 1  Interval development of a probable flow-limiting dissection flap at the prior arteriotomy site in the right common femoral artery superimposed upon moderate to severe atherosclerotic disease  2   Focal severe atherosclerotic narrowing at the adductor canal with additional moderate atherosclerotic narrowing throughout the remainder of the right superficial femoral artery  The study was marked in [facility] for significant notification  Workstation performed: [id number]     CTA ABDOMEN W RUN OFF W WO CONTRAST    Result Date: 10/28/2022  Narrative: CT ANGIOGRAM OF THE AORTA AND LOWER EXTREMITIES WITH IV CONTRAST INDICATION:  Aortoiliac disease  Nonhealing left heel wound  COMPARISON: CT of the left lower extremity with contrast on 10/21/2022  CT of the abdomen pelvis with contrast on 2/20/2022  TECHNIQUE:  CT angiogram examination of the abdomen, pelvis, and lower extremities was performed according to standard protocol with intravenous contrast   This examination, like all CT scans performed in the [facility], was performed utilizing techniques to minimize radiation dose exposure, including the use of iterative reconstruction and automated exposure control    3D reconstructions were performed an independent workstation, and are supplied for review  Rad dose 2492 mGy-cm IV Contrast:  130 mL of iohexol (OMNIPAQUE)  FINDINGS: VASCULAR STRUCTURES:   Visualized thoracoabdominal aorta is patent  The infrarenal abdominal demonstrates mild stenosis, see series 2 image 48 where atherosclerotic disease encroaches upon the lumen, in this region the aortic diameter measures 8 mm  The celiac artery, SMA, AL and bilateral renal arteries are patent  Accessory renal arteries are present bilaterally  RIGHT: Common iliac artery: 50% stenosis proximally Internal iliac artery and branches: Patent  External iliac artery: Patent  Common femoral artery: 50% stenosis  Profunda femoral artery and branches: Patent  Superficial femoral artery: 5075% stenosis of the proximal several centimeters  The mid to distal SFA demonstrates segmental regions of up to 75% stenosis  Popliteal artery: Scattered stenoses of up to 50-75%  Patent three-vessel runoff extending into the right foot  LEFT: Common iliac artery: 50% stenosis proximally  Internal iliac artery and branches: Patent  External iliac artery: Patent  Profunda femoral artery and branches: Patent  Common femoral artery: No significant stenosis  Superficial femoral artery: The proximal 3rd of the vessel demonstrates up to 75% stenosis  The distal SFA demonstrates up to 75% stenosis as well  Popliteal artery: P2 segment greater than 75% stenosis  Patent three-vessel runoff extending into the left foot  OTHER FINDINGS ABDOMEN LOWER CHEST:  Not visualized  LIVER/BILIARY TREE: Partially visualized  Unremarkable  GALLBLADDER:  No calcified gallstones  No pericholecystic inflammatory change  SPLEEN:  Partially visualized  Unremarkable  Normal size  PANCREAS:  Unremarkable  ADRENAL GLANDS: Unchanged small left lateral limb calcification, unchanged  Right adrenal gland unremarkable  KIDNEYS/URETERS:  No solid renal mass  No hydronephrosis   No urinary tract calculi  Right lateral lower pole cyst  PELVIS REPRODUCTIVE ORGANS:  Unremarkable for patient's age  IUD  URINARY BLADDER:  Antidependent air, correlate for recent instrumentation ADDITIONAL ABDOMINAL AND PELVIC STRUCTURES STOMACH AND BOWEL:  Unremarkable  ABDOMINOPELVIC CAVITY:   No pathologically enlarged mesenteric or retroperitoneal lymph nodes  No ascites or free intraperitoneal air  ABDOMINAL WALL/INGUINAL REGIONS:  Mild anasarca  OSSEOUS STRUCTURES:  No acute fracture or destructive osseous lesion  Impression: 1  Moderate stenosis of the mid infrarenal abdominal aorta secondary to calcific atherosclerotic disease  2  LEFT: Up to 50% stenosis of the proximal common iliac artery  The proximal and distal 3rd of the superficial femoral artery demonstrates 50-75% stenosis  Popliteal artery demonstrates greater than 75% stenosis in the P2 segment  Patent three-vessel runoff extending into the left foot  3  RIGHT: Proximal common iliac artery stenosis of up to 50%  50% stenosis of the common femoral artery  Superficial femoral artery demonstrates up to 50% stenosis within the proximal several centimeters, 50-75% stenosis throughout the distal 3rd of the vessel  Popliteal artery demonstrates scattered stenoses of 50-75%  Patent three-vessel runoff extending into the right foot  Workstation performed: [id number]     MRI ankle/heel left  wo contrast    Result Date: 10/27/2022  Narrative: MRI ANKLE/HEEL LEFT WO CONTRAST INDICATION:   left heel eschar, unknown depth, rule out osteomyelitis  COMPARISON:  Left foot and ankle CT from 10/21/2022  TECHNIQUE:   MR sequences were obtained of the ankle including: Localizers, coronal STIR, coronal T1, axial T2 fat sat, axial PD, sagittal T2 fat sat, sagittal T1 sequences were obtained  Gadolinium was not used  FINDINGS: Exam moderately limited due to patient motion  Subcutaneous Tissues:  There is a large plantar lateral heel ulcer, without underlying soft tissue abscess (series 5 image 5-15 ) Joint Effusion: None  TENDONS: Achilles tendon: Normal  Peroneus brevis and longus: Normal  Posterior tibialis, flexor digitorum longus, flexor hallucis longus: Normal  Anterior tibialis, extensor digitorum longus, extensor hallucis longus:  Normal  LIGAMENTS: Lateral collateral ligament complex:  Normal  Distal tibiofibular syndesmosis:  Normal  Medial collateral ligament complex:  Normal  Plantar Fascia:  Normal  Articular Surfaces:  Normal  Sinus Tarsi:  Normal  Tarsal Tunnel: Unremarkable  Bones:  Associated with the plantar lateral heel ulcer, there is only very minimal reactive marrow edema in the calcaneal tubercle evident on T2-weighted sequences (series 3 image 2, series 7 image 8 ) No confluent T1-weighted marrow abnormality or cortical destruction to suggest osteomyelitis  Musculature:  Normal      Impression: Exam moderately limited due to patient motion  There is a large plantar lateral heel ulcer, without underlying soft tissue abscess (series 5 image 5-15 ) Associated with this ulcer, there is only very minimal reactive marrow edema in the calcaneal tubercle evident on T2-weighted sequences (series 3 image 2, series 7 image 8 ) No confluent T1-weighted marrow abnormality or cortical destruction to suggest osteomyelitis  Workstation performed: [id number]     VAS lower limb arterial duplex, complete bilateral    Result Date: 11/9/2022  Narrative:  [facility] REPORT CLINICAL: Indications:  Evaluation s/p intervention  Bilateral lower extremity ulcerations  Operative History: 2022-11-07 Right CFA arterectomy w/  PTA 2022-11-07 Right Popliteal PTA and Posterior tibial PTA 2022-11-07 Right SFA PTA/ stent placement 2022-10-31 Abdominal aortogram 2022-10-31 Right ALLISON Stent 2022-10-31 Right PTA stent 2022-10-31 Left SFA shockwave angioplasty Risk Factors The patient has history of Obesity, HTN, Diabetes, CVA, Hyperlipidemia and previous smoking   Clinical Right Pressure: 188/ mm Hg, Left Pressure: IV  FINDINGS:  Segment                Right            Left                                          PSV (cm/s)  EDV  Impression  PSV (cm/s)  EDV  Common Femoral Artery          81   17                     145   20  Prox Profunda                 174   16                     230   12  Prox SFA                      143   14                      89    8  Mid SFA                        90    9  Occluded             0    0  Dist SFA                       70    8  Occluded             0    0  Proximal Pop                   41   11                      27   11  Distal Pop                     50    5                      20    7  Tibioperoneal                  40                           39       Dist Post Tibial                8    0                       0    0  Dist  Ant  Tibial              21    0                       8    3     CONCLUSION: Impression: RIGHT LOWER LIMB: Monophasic waveforms in the common femoral artery suggesting more proximal disease with diffuse disease throughout the remaining portions of the femoral-popliteal arteries without significant focal stenosis  Findings suggests tibioperoneal disease  Ankle/Brachial index: unobtainable secondary to indistinguishable Doppler signal, previous study 0 51  PVR tracing at the ankle is dampened  The PVR tracing at the metatarsal level and PPG waveform of the great toe are flat lined, suggesting poor distal perfusion, therefore, pressures are unobtainable  LEFT LOWER LIMB: An occlusion of the mid superficial femoral artery with minimal distal reconstitution  Findings suggests tibioperoneal disease  Ankle/Brachial index: unobtainable secondary to indistinguishable Doppler signal, previous study 0 74  The PVR tracings at the ankle and metatarsal levels are flat lined with the PPG waveform of the great toe is flat lined, suggesting poor distal perfusion, therefore, pressures are unobtainable    In comparison to the studies dated 10/24/2022 and 11/1/2022, there is progression in the disease process bilaterally  SIGNATURE: Electronically Signed by: Estrella Clay on 2022-11-09 12:42:16 PM    VAS lower limb arterial duplex, complete bilateral    Result Date: 10/24/2022  Narrative:  [facility]: Indications:  Left heel ulceration  Risk Factors: Obesity, HTN, Diabetes, CVA, Hyperlipidemia and previous smoking  Clinical Right Pressure:  158/ mm Hg, Left Pressure:  168/ mm Hg  FINDINGS:  Segment                Right       Left                                          PSV (cm/s)  PSV (cm/s)  Common Femoral Artery          72         103  Prox Profunda                  92         164  Prox SFA                       86          45  Mid SFA                        93          60  Dist SFA                       57          97  Proximal Pop                   83          63  Distal Pop                    111          79  Tibioperoneal                 100          96  Dist Post Tibial               38           7  Dist  Ant  Tibial              36          46     CONCLUSION: Impression: RIGHT LOWER LIMB: Monophasic waveforms in the common femoral artery suggesting more proximal disease  Diffuse atherosclerotic disease throughout the femoral-popliteal arteries without focal stenosis  Findings suggests tibioperoneal disease  Ankle/Brachial index:  0 80, moderate claudication range  PVR/ PPG tracings are dampened  Metatarsal pressure of 64 mm Hg Great toe pressure of 61 mm Hg, above healing threshold for a diabetic  LEFT LOWER LIMB: Diffuse atherosclerotic disease throughout the femoral-popliteal arteries without focal stenosis  Findings suggests tibioperoneal disease  Ankle/Brachial index:  0 63, severe claudication range  PVR/ PPG tracings are dampened  Metatarsal pressure of 21 mm Hg Great toe pressure of 35 mm Hg, below healing threshold for a diabetic    SIGNATURE: Electronically Signed by: Estrella Clay on 2022-10-24 02:00:50 PM    VAS lower limb arterial duplex, limited, unilateral    Result Date: 11/2/2022  Narrative:  [facility]: Indications:  Evaluated the right CFA and ilaic due to SHARIF decrease after angio/ stent  Operative History: 2022-10-31 Abdominal aortogram 2022-10-31 Right ALLISON Stent 2022-10-31 Right PTA stent 2022-10-31 Left SFA shockwave angioplasty Risk Factors The patient has history of Obesity, HTN, Diabetes (IDDM), Hyperlipidemia and previous smoking (quit >10yrs ago)  FINDINGS:  Right                  PSV (cm/s)  Dist EIA                      682  Common Femoral Artery         825  Prox SFA                      391     CONCLUSION:   Impression: There is a high grade stenosis in the right common femoral artery / proximal femoral artery  SIGNATURE: Electronically Signed by: Noah Moncada on 2022-11-02 04:58:20 PM    VAS SHARIF & waveform analysis, multiple levels    Result Date: 11/1/2022  Narrative:  [facility]: Indications:  SHARIF follow post-op aortogram, LLE run off, PTA stent  Operative History: 2022-10-31 Abdominal aortogram 2022-10-31 Left ALLISON Stent 2022-10-31 Left PTA stent 2022-10-31 Left SFA shockwave angioplasty Risk Factors The patient has history of Obesity, HTN, Diabetes (IDDM), Hyperlipidemia and previous smoking (quit >10yrs ago)  Clinical Right Pressure:  138/ mm Hg, Left Pressure:  158/ mm Hg  FINDINGS:  Segment       Ri  Left                         P    P  Peroneal      76   91  Post  Tibial  81  117  Ankle         81  117  Metatarsal    45   85  Great Toe     50   37     CONCLUSION:  Impression RIGHT LOWER LIMB Ankle/Brachial Index:0 51 wiyh in severe limits (prior 0 63) PPG/PVR Tracings are dampened  Metatarsal Pressure 45 mmHg Great Toe Pressure: 50 mmHg, within the healing range  LEFT LOWER LIMB Ankle/Brachial Index: 0 74 within moderate limits (prior 0 63) PPG/PVR Tracings are dampened   Metatarsal Pressure 85 mmHg Great Toe Pressure: 37 mmHg, below the healing range  SIGNATURE: Electronically Signed by: Bonny Harrison on 2022-11-01 03:41:46 PM    VAS lower limb vein mapping bypass graft    Result Date: 11/3/2022  Narrative:  [facility]: Indications: Patient presents for a general health evaluation secondary to future open heart surgery  Patient is asymptomatic at this time  Operative History: 2022-10-31 Abdominal aortogram 2022-10-31 Right ALLISON Stent 2022-10-31 Right PTA stent 2022-10-31 Left SFA shockwave angioplasty Risk Factors The patient has history of Obesity, HTN, Diabetes (IDDM), Hyperlipidemia and previous smoking (quit >10yrs ago)  FINDINGS:  Segment         Right        Left                            Diameter AP  Diameter AP  GSV Inguinal            9 8          6 1  GSV Prox Thigh          3 5          4 9  GSV Mid Thigh           3 2          3 0  GSV Dist Thigh          3 0          4 7  GSV Knee                2 9          3 3  GSV Prox Calf           3 4          3 3  GSV Mid Calf            3 4          3 7  GSV Dist Calf           3 5          3 3  SSV Mid Calf            2 5          2 0  SSV Knee                1 9          2 1  SSV Ankle               2 5          2 4     CONCLUSION:  Impression: RIGHT LOWER LIMB: The great saphenous vein is patent  from the groin to the ankle  The vein measures >2mm in caliber from the groin to the distal calf  LEFT LOWER LIMB: The great saphenous vein is patent  from the groin to the ankle  The vein measures >2mm in caliber from the groin to the distal calf  SIGNATURE: Electronically Signed by: Bonny Harrison on 2022-11-03 09:45:42 AM    IR aortagram with run-off    Result Date: 11/1/2022  Narrative: PROCEDURE: Ultrasound-guided access of the right common femoral artery  Right common femoral artery sheath arteriogram  Aortogram  Left lower extremity runoff arteriogram  Shockwave balloon lithotripsy of proximal SFA   Proximal SFA Roseline Montgomery stent placement  Shockwave balloon lithotripsy of the distal SFA  Distal SFA Rosalinda stent placement  Postintervention SFA and lower leg arteriograms  Right common iliac artery arteriogram  Right common iliac artery Express stent placement  Post stent placement RCIA arteriogram  Successful right common femoral artery closure with a ProGlide device  STAFF: KELLY Lira  CONTRAST: 180 mL Visapaque FLUOROSCOPY TIME: 34 8 minutes  NUMBER OF IMAGES: Multiple COMPLICATIONS: None  MEDICATIONS: Local lidocaine  Heparin: 6000 units Moderate sedation with fentanyl and Versed was monitored by radiology nursing staff  Monitoring of conscious sedation totaled 180 minutes  INDICATION: Nonhealing left foot /heel wounds  CTA and arterial duplex ultrasound with evidence of significant peripheral vascular disease  PROCEDURE: The patient was placed supine on the procedure table  The right groin was prepped and draped in a sterile manner  Timeout was performed  Ultrasound-guided access of the right common femoral artery was obtained with a micropuncture needle  The access was upsized and a 5 [place] sheath was placed  Access arteriogram was used to confirm that the sheath was not occlusive  Using a SOS flush catheter,  access to the contralateral SFA was obtained with a [place] wire, over which a 6 [place] by 65 cm destination sheath was placed within the left distal external iliac artery  A limited arteriogram was performed through the sheath at the aortic bifurcation, to confirm that the sheath was not occlusive within the known right common iliac artery proximal lesion  Left lower leg runoff arteriogram was performed, demonstrating a proximal SFA focal severe stenosis secondary to eccentric calcific atherosclerotic disease  A similar lesion was identified near the region of the abductor canal  The remainder of the SFA, popliteal artery are patent without significant stenosis   2 vessel runoff via the anterior tibial and peroneal arteries  A 0 014 wire was then advanced across the proximal SFA lesion  Shockwave balloon lithotripsy was performed for several rounds with a 5 mm x 6 cm Shockwave balloon  Postintervention arteriogram revealed no significant change within the focal eccentric plaque  A 6 mm x 6 cm Rosalinda stent was placed across this region, post dilated with a 5 mm  balloon  At completion no significant residual stenosis  The wire was then advanced across the distal SFA lesion  Shockwave balloon lithotripsy was performed for several rounds as above  This resulted in irregular dissection proximal to the lesion  The dissection as well as residual stenosis was stented with 6mm x 10cm Rosalinda stent postdilated with a 5 mm  balloon  Postintervention arteriogram with no significant residual stenosis  Completion left lower leg and foot arteriograms were performed, demonstrating preserved 2 vessel runoff as described above, with qualitatively increased rate of flow to the foot  The sheath was then retracted to the proximal right common iliac artery  Arteriogram was performed demonstrating up to 75% stenosis secondary to a focal region of eccentric calcific plaque  This was stented with a 7 mm x 17mm Express Stent  Post intervention arteriogram without residual stenosis  Successful access closure with a Proglide device  FINDINGS: 1  Ultrasound demonstrated the right common femoral artery to the patent  2  Left lower leg runoff arteriogram demonstrated a proximal SFA focal severe stenosis secondary to eccentric calcific atherosclerotic disease  A similar lesion was identified near the region of the abductor canal  The remainder of the SFA, popliteal artery are patent without significant stenosis  2 vessel runoff via the anterior tibial and peroneal arteries  Successful SFA shockwave lithotripsy and Rosalinda stent placement x 2 as described above   3  Mid right common iliac artery severe focal stenosis secondary to eccentric calcific plaque  Successful Express stent placement as described above  4  Preserved two vessel runoff on the left via the anterior tibial and peroneal arteries with qualitatively increased rate of flow compared to the preintervention arteriogram      Impression: Left lower leg arteriogram with proximal and distal SFA shockwave balloon lithotripsy and stent placement, as well as right common iliac artery stent placement  PLAN: Follow-up arterial duplex  Patient became short of breath after the procedure, with increased O2 requirement  Etiology most likely volume overload secondary to renal prep as well as contrast and fluid was given during the procedure where she was lying flat  Dr Cory Landau from the primary team made aware  Chest x-ray to be obtained upon arrival to the floor  Workstation performed: [id number]     XR chest portable ICU    Result Date: 11/9/2022  Narrative: CHEST INDICATION:   Increased o2 requirement  COMPARISON:  10/31/2022 EXAM PERFORMED/VIEWS:  XR CHEST PORTABLE ICU FINDINGS:  Patient rotation limits evaluation of the cardiomediastinal structures  Grossly stable cardiomediastinal structures  Central pulmonary vascular congestion with perihilar and basilar interstitial edema  No large effusions  No pneumothorax  No acute osseous abnormality  Stable bony structures  Impression: Moderate pulmonary edema, similar to the prior examination  No large effusions  Workstation performed: [id number]     CT lower extremity w contrast left    Result Date: 10/22/2022  Narrative: CT left lower extremity with IV contrast INDICATION: L foot necrosis  COMPARISON: None TECHNIQUE: CT examination of the above was performed  This examination, like all CT scans performed in the [facility], was performed utilizing techniques to minimize radiation dose exposure, including the use of iterative reconstruction  and automated exposure control software    Sagittal and coronal two dimensional reconstructed images were also submitted for interpretation  IV Contrast: 100 mL of iohexol (OMNIPAQUE) Rad dose  592 mGy-cm FINDINGS: OSSEOUS STRUCTURES:  No acute fracture, dislocation or destructive osseous lesion  Mild plantar calcaneal spurring  Mild degenerative changes of the ankle joint VISUALIZED MUSCULATURE:  Unremarkable  SOFT TISSUES:  Atherosclerosis  Moderate superficial soft tissue edema in the foot, ankle, and leg, superimposed cellulitis considered in the appropriate clinical setting  No discrete drainable collection identified  OTHER PERTINENT FINDINGS:  Enthesopathy of the anterior patella along the extensor mechanism attachment  No discrete subcutaneous gas is identified     Impression: No acute bony process is seen  Moderate superficial soft tissue edema in the foot, ankle, and leg, superimposed cellulitis considered in the appropriate clinical setting  Correlation with patient's symptoms and laboratory values recommended  No discrete drainable collection identified  Other findings as above  Workstation performed: [id number]     MRI follow up msk    Result Date: 10/28/2022  Narrative: MRI LEFT FOOT INDICATION:   Inpatient Order left heel eschar, unknown depth, rule out osteomyelitis  COMPARISON: Correlation is made with the prior CT study dated 10/21/2022  TECHNIQUE:  MR sequences were obtained of the left foot including the following:  Localizer, no further images were obtained as the patient could not tolerate the examination due to pain  Imaging performed on 1 5T MRI Gadolinium was not used FINDINGS: The study is nondiagnostic is only localizer images were obtained  The patient could not continue the examination due to underlying pain  Impression: Nondiagnostic study   Workstation performed: [id number]     Echo complete w/ contrast if indicated    Result Date: 10/30/2022  Narrative: •  Left Ventricle: Left ventricular cavity size is normal  There is mild to moderate concentric hypertrophy  The left ventricular ejection fraction is 60% by visual estimation  Systolic function is normal  Wall motion is normal  Diastolic function is normal  •  Right Ventricle: Right ventricular cavity size is normal  Systolic function is normal  •  Aortic Valve: There is aortic sclerosis without clear visualization of the valve leaflets  •  Mitral Valve: There is mild annular calcification  There is trace regurgitation  •  Tricuspid Valve: There is trace regurgitation  Pulmonary artery systolic pressures are estimated at 31 mm Hg  •  There is no study for comparison  Echo follow up/limited w/ contrast if indicated    Result Date: 11/14/2022  Narrative: •  Left Ventricle: Left ventricular cavity size is normal  Wall thickness is normal  The left ventricular ejection fraction is 61%  Systolic function is normal  Wall motion is normal  Diastolic function is normal  •  Mitral Valve: There is mild annular calcification  Compared to prior echocardiographic study dated 10/30/2022, there is no significant change  Labs and pertinent reports reviewed  This note has been generated by voice recognition software system  Therefore, there may be spelling, grammar, and or syntax errors  Please contact if questions arise

## 2022-12-08 NOTE — ASSESSMENT & PLAN NOTE
Blood pressure elevated above goal, likely related to pain   continue Norvasc 10 mg, Cardura 2 mg daily, lisinopril 40 mg daily, metoprolol succinate 25 mg daily  Labetalol as needed  If no improvement today will increase Cardura

## 2022-12-08 NOTE — ASSESSMENT & PLAN NOTE
Lab Results   Component Value Date    HGBA1C 9 8 (H) 10/25/2022     Recent Labs     12/07/22  1521 12/07/22 2058 12/08/22  0753 12/08/22  1123   POCGLU 104 150* 136 138     Significantly reduced regimen from home due to poor oral intake  Continue Lantus to 8 units nightly plus sliding scale  Continue to titrate

## 2022-12-08 NOTE — ASSESSMENT & PLAN NOTE
· Initially admitted for nonhealing wound of the left lower extremity, requiring multiple procedures ultimately required left AKA on 11/23/2022 and right AKA on 12/1/22  · Postoperative wound and pain management per primary service, plastic surgery  · PT/OT  · Eventual short-term rehab placement  · Appreciate psychiatry recommendations

## 2022-12-08 NOTE — PROGRESS NOTES
[facility]  [facility] 1960, 65 y o  female MRN: [id number]  Unit/Bed#: E4 -01 Encounter: [id number]  Primary Care Provider: ALEX Caceres   Date and time admitted to hospital: 10/21/2022  7:58 PM    * PAD (peripheral artery disease) ([facility] [address] )  Assessment & Plan  · Initially admitted for nonhealing wound of the left lower extremity, requiring multiple procedures ultimately required left AKA on 11/23/2022 and right AKA on 12/1/22  · Postoperative wound and pain management per primary service, plastic surgery  · PT/OT  · Eventual short-term rehab placement  · Appreciate psychiatry recommendations    HIT (heparin-induced thrombocytopenia)  Assessment & Plan  · Positive heparin antibody on 11/11/2020  · Appreciate heme Onc recommendations  · Plan for argatroban to Coumadin bridge  · Coumadin dosed 5 mg nightly  · Trend INR daily    Acute on chronic anemia  Assessment & Plan  Hemoglobin stable    Hypokalemia  Assessment & Plan  Replete  Recent Labs     12/07/22  1800 12/08/22  0514   K 4 6 4 7     Repleted  Monitor potassium with addition of ACE inhibitor    Type 2 diabetes mellitus with hyperglycemia, with long-term current use of insulin [facility])  Assessment & Plan  Lab Results   Component Value Date    HGBA1C 9 8 (H) 10/25/2022     Recent Labs     12/07/22  1521 12/07/22  2058 12/08/22  0753 12/08/22  1123   POCGLU 104 150* 136 138     Significantly reduced regimen from home due to poor oral intake  Continue Lantus to 8 units nightly plus sliding scale  Continue to titrate      Stroke [facility])  Assessment & Plan  · Noted to have change in mental status on 11/13/2022  · CT confirmed moderate right parietal lobe stroke, likely embolic in the setting of HIT  · Heme Onc recommends Coumadin    Currently on argatroban bridge to Coumadin    Benign essential hypertension  Assessment & Plan  Blood pressure elevated above goal, likely related to pain   continue Norvasc 10 mg, Cardura 2 mg daily, lisinopril 40 mg daily, metoprolol succinate 25 mg daily  Labetalol as needed  If no improvement today will increase Cardura    Depression, recurrent (HCC)  Assessment & Plan  Evaluated by Psychiatry x 2 on this admission  · Continue medications recommended by psychiatry    Acute respiratory failure with hypoxia [facility])  Assessment & Plan  Multifactorial secondary to atelectasis, hypoventilation and sedentary  Resolved  VTE Pharmacologic Prophylaxis: Argatroban to Coumadin    Patient Centered Rounds:  Patient care rounds were performed with nursing    Time Spent for Care: 30  More than 50% of total time spent on counseling and coordination of care as described above  Current Length of Stay: 47 day(s)    Current Patient Status: Inpatient   Certification Statement: The patient will continue to require additional inpatient hospital stay due to postoperative management, bridged to Coumadin    Discharge Plan: Primary team    Code Status: Level 1 - Full Code      Subjective:   Patient seen and evaluated at bedside  Reports that she is having a good day so far  Objective:     Vitals:   Temp (24hrs), Av °F (36 7 °C), Min:97 3 °F (36 3 °C), Max:98 6 °F (37 °C)    Temp:  [97 3 °F (36 3 °C)-98 6 °F (37 °C)] 97 3 °F (36 3 °C)  HR:  [73-82] 79  Resp:  [18-20] 20  BP: (159-170)/(67-91) 170/91  SpO2:  [95 %-98 %] 95 %  Body mass index is 28 76 kg/m²  Input and Output Summary (last 24 hours): Intake/Output Summary (Last 24 hours) at 2022 1329  Last data filed at 2022 0600  Gross per 24 hour   Intake 46 87 ml   Output 2300 ml   Net -2253 13 ml       Physical Exam:     Physical Exam  Vitals reviewed  Constitutional:       General: She is not in acute distress  Appearance: She is well-developed  She is not ill-appearing, toxic-appearing or diaphoretic  HENT:      Head: Normocephalic and atraumatic        Mouth/Throat:      Mouth: Mucous membranes are moist    Eyes: General: No scleral icterus  Extraocular Movements: Extraocular movements intact  Cardiovascular:      Rate and Rhythm: Normal rate and regular rhythm  Heart sounds: Normal heart sounds  Pulmonary:      Effort: Pulmonary effort is normal  No respiratory distress  Breath sounds: Normal breath sounds  No wheezing or rales  Abdominal:      General: There is no distension  Palpations: Abdomen is soft  Tenderness: There is no abdominal tenderness  There is no guarding or rebound  Musculoskeletal:      Comments: Bilateral AKA   Skin:     General: Skin is warm and dry  Neurological:      Mental Status: She is alert  Psychiatric:      Comments: Depressed affect         Additional Data:     Labs: I have reviewed pertinent results     Results from last 7 days   Lab Units 12/08/22  0514 12/06/22  0507 12/05/22  0546   WBC Thousand/uL 12 30*   < > 13 27*   HEMOGLOBIN g/dL 7 7*   < > 8 5*   HEMATOCRIT % 25 4*   < > 27 8*   PLATELETS Thousands/uL 465*   < > 447*   NEUTROS PCT %  --   --  85*   LYMPHS PCT %  --   --  6*   MONOS PCT %  --   --  6   EOS PCT %  --   --  2    < > = values in this interval not displayed  Results from last 7 days   Lab Units 12/08/22  0514 12/04/22  0907 12/02/22  1024   SODIUM mmol/L 138   < >  --    POTASSIUM mmol/L 4 7   < >  --    CHLORIDE mmol/L 103   < >  --    CO2 mmol/L 27   < >  --    BUN mg/dL 12   < >  --    CREATININE mg/dL 0 75   < >  --    ANION GAP mmol/L 8   < >  --    CALCIUM mg/dL 7 0*   < >  --    ALBUMIN g/dL  --   --  1 0*   TOTAL BILIRUBIN mg/dL  --   --  0 43   ALK PHOS U/L  --   --  318*   ALT U/L  --   --  12   AST U/L  --   --  25   GLUCOSE RANDOM mg/dL 154*   < >  --     < > = values in this interval not displayed       Results from last 7 days   Lab Units 12/08/22  0514   INR  1 73*     Results from last 7 days   Lab Units 12/08/22  1123 12/08/22  0753 12/07/22 2058 12/07/22  1521 12/07/22  1131 12/07/22  0736 12/06/22 2050 12/06/22  1608 12/06/22  1056 12/06/22  0719 12/05/22  2112 12/05/22  1548   POC GLUCOSE mg/dl 138 136 150* 104 124 102 153* 119 96 86 93 94                 Imaging: I have reviewed pertinent imaging       Recent Cultures (last 7 days):           Last 24 Hours Medication List:   Current Facility-Administered Medications   Medication Dose Route Frequency Provider Last Rate   • acetaminophen  975 mg Oral Q6H [address] Sarahy Webb MD     • ALPRAZolam  0 25 mg Oral Daily PRN Randene KETURAH Casey     • amLODIPine  10 mg Oral Daily Randene BidKETURAH burkett     • argatroban  0 1-3 mcg/kg/min Intravenous Titrated Mariela Casey PA-C 0 1 mcg/kg/min (12/07/22 2042)   • atorvastatin  40 mg Oral Daily With Dinner Taylorene KETURAH Casey     • buPROPion  150 mg Oral Daily Yulia Graves MD     • clopidogrel  75 mg Oral Daily Randene BidKETURAH burkett     • collagenase   Topical Daily Heriberto Weathers MD     • doxazosin  2 mg Oral Daily Taylorene KETURAH Casey     • gabapentin  400 mg Oral TID Sarahy Webb MD     • insulin glargine  8 Units Subcutaneous HS Pervis Scale, DO     • insulin lispro  1-6 Units Subcutaneous TID [facility] [place] Mariela Casey PA-C     • labetalol  10 mg Intravenous Q6H PRN Pervis Scale, DO     • lisinopril  40 mg Oral Daily Pervis Scale, DO     • methocarbamol  1,000 mg Oral [facility] Sarahy Webb MD     • metoclopramide  10 mg Intravenous Q6H PRN Taylorene KETURAH Casey     • metoprolol succinate  25 mg Oral Daily Pervis Scale, DO     • naloxone  0 04 mg Intravenous Q1MIN PRN Taylorene KETURAH Casey     • nystatin   Topical BID Mariela Casey PA-C     • ondansetron  4 mg Intravenous Q6H PRN Taylorene KETURAH Casey     • oxyCODONE  10 mg Oral Q12H [address] Sarahy Webb MD     • oxyCODONE  2 5 mg Oral Q4H PRN Sarahy Webb MD      Or   • oxyCODONE  5 mg Oral Q4H PRN Sarahy Webb MD      Or   • oxyCODONE  7 5 mg Oral Q4H PRN Sarahy Webb MD     • pantoprazole  40 mg Oral Early Morning Pilo Moreau PA-C     • polyethylene glycol  17 g Oral BID Pilo Moreau PA-C     • QUEtiapine  25 mg Oral HS Pilo Moreau PA-C     • senna  1 tablet Oral BID Pilo Moreau PA-C     • sertraline  175 mg Oral Daily Beryle Shu, MD     • warfarin  5 mg Oral Daily (warfarin) Saadia Delarosa DO          Today, Patient Was Seen By: Saadia Delarosa DO    ** Please Note: Dictation voice to text software may have been used in the creation of this document   **

## 2022-12-09 LAB
ANION GAP SERPL CALCULATED.3IONS-SCNC: 9 MMOL/L (ref 4–13)
APTT PPP: 62 SECONDS (ref 23–37)
APTT PPP: 67 SECONDS (ref 23–37)
BUN SERPL-MCNC: 14 MG/DL (ref 5–25)
CALCIUM SERPL-MCNC: 7.8 MG/DL (ref 8.3–10.1)
CHLORIDE SERPL-SCNC: 105 MMOL/L (ref 96–108)
CO2 SERPL-SCNC: 27 MMOL/L (ref 21–32)
CREAT SERPL-MCNC: 0.78 MG/DL (ref 0.6–1.3)
GFR SERPL CREATININE-BSD FRML MDRD: 82 ML/MIN/1.73SQ M
GLUCOSE SERPL-MCNC: 142 MG/DL (ref 65–140)
GLUCOSE SERPL-MCNC: 142 MG/DL (ref 65–140)
GLUCOSE SERPL-MCNC: 145 MG/DL (ref 65–140)
GLUCOSE SERPL-MCNC: 157 MG/DL (ref 65–140)
GLUCOSE SERPL-MCNC: 158 MG/DL (ref 65–140)
GLUCOSE SERPL-MCNC: 173 MG/DL (ref 65–140)
INR PPP: 2.27 (ref 0.84–1.19)
POTASSIUM SERPL-SCNC: 4 MMOL/L (ref 3.5–5.3)
PROTHROMBIN TIME: 25 SECONDS (ref 11.6–14.5)
SODIUM SERPL-SCNC: 141 MMOL/L (ref 135–147)

## 2022-12-09 PROCEDURE — 97530 THERAPEUTIC ACTIVITIES: CPT

## 2022-12-09 PROCEDURE — 85730 THROMBOPLASTIN TIME PARTIAL: CPT | Performed by: INTERNAL MEDICINE

## 2022-12-09 PROCEDURE — 85610 PROTHROMBIN TIME: CPT | Performed by: INTERNAL MEDICINE

## 2022-12-09 PROCEDURE — 99232 SBSQ HOSP IP/OBS MODERATE 35: CPT | Performed by: INTERNAL MEDICINE

## 2022-12-09 PROCEDURE — 80048 BASIC METABOLIC PNL TOTAL CA: CPT | Performed by: PHYSICIAN ASSISTANT

## 2022-12-09 PROCEDURE — 97110 THERAPEUTIC EXERCISES: CPT

## 2022-12-09 PROCEDURE — 82948 REAGENT STRIP/BLOOD GLUCOSE: CPT

## 2022-12-09 PROCEDURE — 99024 POSTOP FOLLOW-UP VISIT: CPT | Performed by: SURGERY

## 2022-12-09 RX ORDER — DOXAZOSIN MESYLATE 4 MG/1
4 TABLET ORAL DAILY
Status: DISCONTINUED | OUTPATIENT
Start: 2022-12-10 | End: 2022-12-10

## 2022-12-09 RX ORDER — DOXAZOSIN MESYLATE 4 MG/1
4 TABLET ORAL DAILY
Status: DISCONTINUED | OUTPATIENT
Start: 2022-12-09 | End: 2022-12-09

## 2022-12-09 RX ORDER — MAGNESIUM HYDROXIDE/ALUMINUM HYDROXICE/SIMETHICONE 120; 1200; 1200 MG/30ML; MG/30ML; MG/30ML
30 SUSPENSION ORAL EVERY 4 HOURS PRN
Status: DISCONTINUED | OUTPATIENT
Start: 2022-12-09 | End: 2023-01-12 | Stop reason: HOSPADM

## 2022-12-09 RX ADMIN — GABAPENTIN 400 MG: 400 CAPSULE ORAL at 18:02

## 2022-12-09 RX ADMIN — SENNOSIDES 8.6 MG: 8.6 TABLET, FILM COATED ORAL at 18:02

## 2022-12-09 RX ADMIN — POLYETHYLENE GLYCOL 3350 17 G: 17 POWDER, FOR SOLUTION ORAL at 08:55

## 2022-12-09 RX ADMIN — OXYCODONE HYDROCHLORIDE 10 MG: 10 TABLET, FILM COATED, EXTENDED RELEASE ORAL at 23:07

## 2022-12-09 RX ADMIN — LISINOPRIL 40 MG: 20 TABLET ORAL at 08:55

## 2022-12-09 RX ADMIN — QUETIAPINE FUMARATE 25 MG: 25 TABLET ORAL at 21:22

## 2022-12-09 RX ADMIN — ACETAMINOPHEN 975 MG: 325 TABLET, FILM COATED ORAL at 01:32

## 2022-12-09 RX ADMIN — METHOCARBAMOL 1000 MG: 500 TABLET ORAL at 13:14

## 2022-12-09 RX ADMIN — COLLAGENASE SANTYL: 250 OINTMENT TOPICAL at 08:55

## 2022-12-09 RX ADMIN — METOPROLOL SUCCINATE 25 MG: 25 TABLET, EXTENDED RELEASE ORAL at 08:55

## 2022-12-09 RX ADMIN — OXYCODONE 5 MG: 5 TABLET ORAL at 14:01

## 2022-12-09 RX ADMIN — ACETAMINOPHEN 975 MG: 325 TABLET, FILM COATED ORAL at 06:33

## 2022-12-09 RX ADMIN — SERTRALINE HYDROCHLORIDE 175 MG: 100 TABLET ORAL at 08:55

## 2022-12-09 RX ADMIN — WARFARIN SODIUM 7.5 MG: 5 TABLET ORAL at 18:02

## 2022-12-09 RX ADMIN — CLOPIDOGREL BISULFATE 75 MG: 75 TABLET ORAL at 08:55

## 2022-12-09 RX ADMIN — ALUMINUM HYDROXIDE, MAGNESIUM HYDROXIDE, AND SIMETHICONE 30 ML: 200; 200; 20 SUSPENSION ORAL at 11:59

## 2022-12-09 RX ADMIN — AMLODIPINE BESYLATE 10 MG: 10 TABLET ORAL at 08:55

## 2022-12-09 RX ADMIN — METHOCARBAMOL 1000 MG: 500 TABLET ORAL at 06:33

## 2022-12-09 RX ADMIN — NYSTATIN: 100000 POWDER TOPICAL at 18:03

## 2022-12-09 RX ADMIN — ACETAMINOPHEN 975 MG: 325 TABLET, FILM COATED ORAL at 18:02

## 2022-12-09 RX ADMIN — NYSTATIN: 100000 POWDER TOPICAL at 08:56

## 2022-12-09 RX ADMIN — INSULIN LISPRO 1 UNITS: 100 INJECTION, SOLUTION INTRAVENOUS; SUBCUTANEOUS at 18:07

## 2022-12-09 RX ADMIN — INSULIN GLARGINE 8 UNITS: 100 INJECTION, SOLUTION SUBCUTANEOUS at 23:02

## 2022-12-09 RX ADMIN — SENNOSIDES 8.6 MG: 8.6 TABLET, FILM COATED ORAL at 08:56

## 2022-12-09 RX ADMIN — GABAPENTIN 400 MG: 400 CAPSULE ORAL at 08:55

## 2022-12-09 RX ADMIN — OXYCODONE 7.5 MG: 5 TABLET ORAL at 18:10

## 2022-12-09 RX ADMIN — DOXAZOSIN 4 MG: 4 TABLET ORAL at 08:55

## 2022-12-09 RX ADMIN — METHOCARBAMOL 1000 MG: 500 TABLET ORAL at 21:21

## 2022-12-09 RX ADMIN — ALUMINUM HYDROXIDE, MAGNESIUM HYDROXIDE, AND SIMETHICONE 30 ML: 200; 200; 20 SUSPENSION ORAL at 23:06

## 2022-12-09 RX ADMIN — ACETAMINOPHEN 975 MG: 325 TABLET, FILM COATED ORAL at 13:10

## 2022-12-09 RX ADMIN — BUPROPION HYDROCHLORIDE 150 MG: 150 TABLET, FILM COATED, EXTENDED RELEASE ORAL at 08:55

## 2022-12-09 RX ADMIN — ATORVASTATIN CALCIUM 40 MG: 40 TABLET, FILM COATED ORAL at 18:05

## 2022-12-09 RX ADMIN — PANTOPRAZOLE SODIUM 40 MG: 40 TABLET, DELAYED RELEASE ORAL at 06:33

## 2022-12-09 RX ADMIN — GABAPENTIN 400 MG: 400 CAPSULE ORAL at 23:02

## 2022-12-09 RX ADMIN — LABETALOL HYDROCHLORIDE 10 MG: 5 INJECTION, SOLUTION INTRAVENOUS at 07:00

## 2022-12-09 RX ADMIN — OXYCODONE HYDROCHLORIDE 10 MG: 10 TABLET, FILM COATED, EXTENDED RELEASE ORAL at 08:55

## 2022-12-09 NOTE — PROGRESS NOTES
[facility] [facility]  [facility] 1960, 65 y o  female MRN: [id number]  Unit/Bed#: E4 -01 Encounter: [id number]  Primary Care Provider: ALEX Ramos   Date and time admitted to hospital: 10/21/2022  7:58 PM    * PAD (peripheral artery disease) ([facility] [address] )  Assessment & Plan  · Initially admitted for nonhealing wound of the left lower extremity, requiring multiple procedures ultimately required left AKA on 11/23/2022 and right AKA on 12/1/22  · Postoperative wound and pain management per primary service, plastic surgery  · PT/OT  · Eventual short-term rehab placement  · Appreciate psychiatry recommendations    HIT (heparin-induced thrombocytopenia)  Assessment & Plan  · Positive heparin antibody on 11/11/2020  · Appreciate heme Onc recommendations  · Plan for argatroban to Coumadin bridge  · Increase Coumadin to 7 5 mg nightly  · Trend INR daily    Acute on chronic anemia  Assessment & Plan  Hemoglobin stable    Hypokalemia  Assessment & Plan  Replete  Recent Labs     12/07/22  1800 12/08/22  0514 12/09/22  0352   K 4 6 4 7 4 0     Repleted  Monitor potassium with addition of ACE inhibitor    Type 2 diabetes mellitus with hyperglycemia, with long-term current use of insulin [facility])  Assessment & Plan  Lab Results   Component Value Date    HGBA1C 9 8 (H) 10/25/2022     Recent Labs     12/08/22  1605 12/08/22  2055 12/09/22  0731 12/09/22  1124   POCGLU 150* 155* 142* 145*     Significantly reduced regimen from home due to poor oral intake  Continue Lantus to 8 units nightly plus sliding scale  Continue to titrate      Stroke [facility])  Assessment & Plan  · Noted to have change in mental status on 11/13/2022  · CT confirmed moderate right parietal lobe stroke, likely embolic in the setting of HIT  · Heme Onc recommends Coumadin    Currently on argatroban bridge to Coumadin    Benign essential hypertension  Assessment & Plan  Blood pressure elevated above goal, likely related to pain  Continue Norvasc 10 mg, lisinopril 40 mg daily, metoprolol succinate 25 mg daily  Increase Cardura to 4 mg  Labetalol as needed      Depression, recurrent ([address] )  Assessment & Plan  Evaluated by Psychiatry x 2 on this admission  · Continue medications recommended by psychiatry    Acute respiratory failure with hypoxia [facility])  Assessment & Plan  Multifactorial secondary to atelectasis, hypoventilation and sedentary  Resolved  VTE Pharmacologic Prophylaxis: Argatroban, Coumadin    Patient Centered Rounds:  Patient care rounds were performed with nursing    Education and Discussions with Family / Patient: Patient    Time Spent for Care: 30  More than 50% of total time spent on counseling and coordination of care as described above  Current Length of Stay: 48 day(s)    Current Patient Status: Inpatient   Certification Statement: The patient will continue to require additional inpatient hospital stay due to bridge to argatroban    Discharge Plan: Per primary team    Code Status: Level 1 - Full Code      Subjective:   Patient seen and evaluated at bedside  She seems to be improving as far as her pain  Objective:     Vitals:   Temp (24hrs), Av 1 °F (36 2 °C), Min:97 °F (36 1 °C), Max:97 3 °F (36 3 °C)    Temp:  [97 °F (36 1 °C)-97 3 °F (36 3 °C)] 97 3 °F (36 3 °C)  HR:  [70-82] 70  Resp:  [20] 20  BP: (161-195)/(59-90) 161/59  SpO2:  [97 %-98 %] 97 %  Body mass index is 29 1 kg/m²  Input and Output Summary (last 24 hours): Intake/Output Summary (Last 24 hours) at 2022 1306  Last data filed at 2022 0500  Gross per 24 hour   Intake 2 6 ml   Output 600 ml   Net -597 4 ml       Physical Exam:     Physical Exam  Vitals reviewed  Constitutional:       General: She is not in acute distress  Appearance: She is well-developed  She is not ill-appearing, toxic-appearing or diaphoretic  HENT:      Head: Normocephalic and atraumatic        Mouth/Throat:      Mouth: Mucous membranes are moist    Eyes:      General: No scleral icterus  Extraocular Movements: Extraocular movements intact  Cardiovascular:      Rate and Rhythm: Normal rate and regular rhythm  Heart sounds: Normal heart sounds  Pulmonary:      Effort: Pulmonary effort is normal  No respiratory distress  Breath sounds: Normal breath sounds  No wheezing or rales  Abdominal:      General: There is no distension  Palpations: Abdomen is soft  Tenderness: There is no abdominal tenderness  There is no guarding or rebound  Musculoskeletal:      Comments: Bilateral AKA   Skin:     General: Skin is warm and dry  Neurological:      General: No focal deficit present  Mental Status: She is alert  Mental status is at baseline  Psychiatric:         Mood and Affect: Mood normal          Behavior: Behavior normal          Thought Content: Thought content normal          Judgment: Judgment normal          Additional Data:     Labs: I have reviewed pertinent results     Results from last 7 days   Lab Units 12/08/22  0514 12/06/22  0507 12/05/22  0546   WBC Thousand/uL 12 30*   < > 13 27*   HEMOGLOBIN g/dL 7 7*   < > 8 5*   HEMATOCRIT % 25 4*   < > 27 8*   PLATELETS Thousands/uL 465*   < > 447*   NEUTROS PCT %  --   --  85*   LYMPHS PCT %  --   --  6*   MONOS PCT %  --   --  6   EOS PCT %  --   --  2    < > = values in this interval not displayed       Results from last 7 days   Lab Units 12/09/22  0352   SODIUM mmol/L 141   POTASSIUM mmol/L 4 0   CHLORIDE mmol/L 105   CO2 mmol/L 27   BUN mg/dL 14   CREATININE mg/dL 0 78   ANION GAP mmol/L 9   CALCIUM mg/dL 7 8*   GLUCOSE RANDOM mg/dL 142*     Results from last 7 days   Lab Units 12/09/22  0352   INR  2 27*     Results from last 7 days   Lab Units 12/09/22  1124 12/09/22  0731 12/08/22 2055 12/08/22  1605 12/08/22  1123 12/08/22  0753 12/07/22 2058 12/07/22  1521 12/07/22  1131 12/07/22  0736 12/06/22  2050 12/06/22  1608   POC GLUCOSE mg/dl 145* 142* 155* 150* 138 136 150* 104 124 102 153* 119                 Imaging: I have reviewed pertinent imaging       Recent Cultures (last 7 days):           Last 24 Hours Medication List:   Current Facility-Administered Medications   Medication Dose Route Frequency Provider Last Rate   • acetaminophen  975 mg Oral Q6H [facility] Birdie Rodriguez MD     • ALPRAZolam  0 25 mg Oral Daily PRN Yohannes Montes PA-C     • aluminum-magnesium hydroxide-simethicone  30 mL Oral Q4H PRN [facility], DO     • amLODIPine  10 mg Oral Daily Yohannes Montes PA-C     • argatroban  0 1-3 mcg/kg/min Intravenous Titrated Yohannes Montes PA-C 0 02 mcg/kg/min (12/09/22 0352)   • atorvastatin  40 mg Oral Daily With Dinner Yohannes Montes PA-C     • buPROPion  150 mg Oral Daily Corrinne Pap, MD     • clopidogrel  75 mg Oral Daily Yohannes Montes PA-C     • collagenase   Topical Daily Akiko Canada MD     • doxazosin  4 mg Oral Daily [facility], DO     • gabapentin  400 mg Oral TID Birdie Rodriguez MD     • insulin glargine  8 Units Subcutaneous HS [facility], DO     • insulin lispro  1-6 Units Subcutaneous TID [facility] Yohannes Montes PA-C     • labetalol  10 mg Intravenous Q6H PRN [facility], DO     • lisinopril  40 mg Oral Daily [facility], DO     • methocarbamol  1,000 mg Oral [facility] Birdie Rodriguez MD     • metoclopramide  10 mg Intravenous Q6H PRN Yohannes Montes PA-C     • metoprolol succinate  25 mg Oral Daily [facility], DO     • naloxone  0 04 mg Intravenous Q1MIN PRN Yohannes Montes PA-C     • nystatin   Topical BID Yohannes Montes PA-C     • ondansetron  4 mg Intravenous Q6H PRN Yohannes Montes PA-C     • oxyCODONE  10 mg Oral Q12H [facility] Birdie Rodriguez MD     • oxyCODONE  2 5 mg Oral Q4H PRN Birdie Rodriguez MD      Or   • oxyCODONE  5 mg Oral Q4H PRN Bidrie Rodriguez MD      Or   • oxyCODONE  7 5 mg Oral Q4H PRN Birdie Rodriguez MD     • pantoprazole  40 mg Oral Early Morning Jonah Andrews PA-C     • polyethylene glycol  17 g Oral BID Jonah Andrews PA-C     • QUEtiapine  25 mg Oral HS Jonah Andrews PA-C     • senna  1 tablet Oral BID Jonah Andrews PA-C     • sertraline  175 mg Oral Daily Kerri Pacheco MD     • warfarin  7 5 mg Oral Daily (warfarin) Paula Clemente DO          Today, Patient Was Seen By: Paula Clemente DO    ** Please Note: Dictation voice to text software may have been used in the creation of this document   **

## 2022-12-09 NOTE — PROGRESS NOTES
[address] [facility]  Progress Note - [address] [facility] [address] 1960, 65 y o  female MRN: [id number]  Unit/Bed#: E4 -01 Encounter: [id number]  Primary Care Provider: ALEX Thomas   Date and time admitted to hospital: 10/21/2022  7:58 PM    * PAD (peripheral artery disease) [facility])  Assessment & Plan  65year old female former smoker w/HTN, HLD, uncontrolled type II DM (A1c 9 8), hx CVA, presenting with nonhealing extensive L heel ulceration and R hallux and 5th digit ulceration  Vascular surgery consulted for input  S/p multiple b/l endovascular interventions  JUSTIN  R hemispheric CVA    S/p L AKA 11/23/22 [facility])  S/p R AKA, wound debridement 12/1/22 [facility])      Recommendations:  - Bilateral tissue loss in the setting of uncontrolled type II DM and NYDIA on admission, now s/p multiple angiograms w/intervention    - Endovascular interventions, c/b atheroembolic event to the RLE and JUSTIN with R hemispheric CVA  - Now s/p L AKA on 11/23/22 and R AKA 12/1/22 w/ proximal thigh wound debridements  - Plastic surgery input appreciated  Recommending daily santyl to R thigh wounds and possible future wound VAC to areas  - Continue argatroban drip per protocol will plan to bridge to Coumadin  Heme Onc note noted  -f/u INR  Continue argatroban gtt  -Goal INR is for while bridging to Coumadin with argatroban and once achieved overlap for 5 days, discontinue argatroban drip and recheck INR in 4-6 hours to ensure therapeutic levels  - Medical management with statin, Plavix  - Medical management and glucose control per SLIM  - Continue oral pain regimen, adjusted to long acting which appears to be controlling patient's pain well   - Appreciate psych input and management - addition of wellbutrin and adjustment of zoloft   - Wound care orders placed       Subjective: No acute events overnight  HTN overnight   Wounds with some drainage b/l per nursing pt having some incontinence mixed with drainage from wound  Vitals:  /78 (BP Location: Right arm)   Pulse 86   Temp 98 3 °F (36 8 °C) (Temporal)   Resp 20   Ht 5' 4" (1 626 m)   Wt 76 9 kg (169 lb 8 5 oz)   SpO2 98%   BMI 29 10 kg/m²     I/Os:  I/O last 3 completed shifts: In: 55 5 [I V :55 5]  Out: 2400 [Urine:2400]  No intake/output data recorded  Lab Results and Cultures:   CBC with diff:   Lab Results   Component Value Date    WBC 12 30 (H) 12/08/2022    HGB 7 7 (L) 12/08/2022    HCT 25 4 (L) 12/08/2022    MCV 85 12/08/2022     (H) 12/08/2022    MCH 25 8 (L) 12/08/2022    MCHC 30 3 (L) 12/08/2022    RDW 16 8 (H) 12/08/2022    MPV 8 1 (L) 12/08/2022    NRBC 0 12/05/2022   ,   BMP/CMP:  Lab Results   Component Value Date    SODIUM 141 12/09/2022    K 4 0 12/09/2022     12/09/2022    CO2 27 12/09/2022    BUN 14 12/09/2022    CREATININE 0 78 12/09/2022    CALCIUM 7 8 (L) 12/09/2022    AST 25 12/02/2022    ALT 12 12/02/2022    ALKPHOS 318 (H) 12/02/2022    EGFR 82 12/09/2022   ,   Lipid Panel: No results found for: CHOL,   Coags:   Lab Results   Component Value Date    PTT 67 (H) 12/09/2022    INR 2 27 (H) 12/09/2022   ,     Blood Culture:   Lab Results   Component Value Date    BLOODCX No Growth After 5 Days   11/13/2022    BLOODCX Staphylococcus coagulase negative (A) 11/13/2022   ,   Urinalysis:   Lab Results   Component Value Date    COLORU Yellow 11/12/2022    CLARITYU Slightly Cloudy 11/12/2022    SPECGRAV 1 025 11/12/2022    PHUR 5 5 11/12/2022    PHUR 6 5 02/23/2018    LEUKOCYTESUR Trace (A) 11/12/2022    NITRITE Positive (A) 11/12/2022    GLUCOSEU Negative 11/12/2022    KETONESU Negative 11/12/2022    BILIRUBINUR Negative 11/12/2022    BLOODU Large (A) 11/12/2022   ,   Urine Culture:   Lab Results   Component Value Date    URINECX >100,000 cfu/ml Enterobacter cloacae complex (A) 11/12/2022    URINECX 70,000-79,000 cfu/ml Kluyveromyces marxianus (A) 11/12/2022   ,   Wound Culure: No results found for: WOUNDCULT    Medications:  Current Facility-Administered Medications   Medication Dose Route Frequency   • acetaminophen (TYLENOL) tablet 975 mg  975 mg Oral Q6H [address]   • ALPRAZolam (XANAX) tablet 0 25 mg  0 25 mg Oral Daily PRN   • aluminum-magnesium hydroxide-simethicone (MYLANTA) oral suspension 30 mL  30 mL Oral Q4H PRN   • amLODIPine (NORVASC) tablet 10 mg  10 mg Oral Daily   • argatroban infusion (premix)  0 1-3 mcg/kg/min Intravenous Titrated   • atorvastatin (LIPITOR) tablet 40 mg  40 mg Oral Daily With Dinner   • buPROPion (WELLBUTRIN XL) 24 hr tablet 150 mg  150 mg Oral Daily   • clopidogrel (PLAVIX) tablet 75 mg  75 mg Oral Daily   • collagenase (SANTYL) ointment   Topical Daily   • [START ON 12/10/2022] doxazosin (CARDURA) tablet 4 mg  4 mg Oral Daily   • gabapentin (NEURONTIN) capsule 400 mg  400 mg Oral TID   • insulin glargine (LANTUS) subcutaneous injection 8 Units 0 08 mL  8 Units Subcutaneous HS   • insulin lispro (HumaLOG) 100 units/mL subcutaneous injection 1-6 Units  1-6 Units Subcutaneous TID AC   • labetalol (NORMODYNE) injection 10 mg  10 mg Intravenous Q6H PRN   • lisinopril (ZESTRIL) tablet 40 mg  40 mg Oral Daily   • methocarbamol (ROBAXIN) tablet 1,000 mg  1,000 mg Oral Q8H [address]   • metoclopramide (REGLAN) injection 10 mg  10 mg Intravenous Q6H PRN   • metoprolol succinate (TOPROL-XL) 24 hr tablet 25 mg  25 mg Oral Daily   • naloxone (NARCAN) 0 04 mg/mL syringe 0 04 mg  0 04 mg Intravenous Q1MIN PRN   • nystatin (MYCOSTATIN) powder   Topical BID   • ondansetron (ZOFRAN) injection 4 mg  4 mg Intravenous Q6H PRN   • oxyCODONE (OxyCONTIN) 12 hr tablet 10 mg  10 mg Oral Q12H MICHELLE   • oxyCODONE (ROXICODONE) IR tablet 2 5 mg  2 5 mg Oral Q4H PRN    Or   • oxyCODONE (ROXICODONE) IR tablet 5 mg  5 mg Oral Q4H PRN    Or   • oxyCODONE (ROXICODONE) IR tablet 7 5 mg  7 5 mg Oral Q4H PRN   • pantoprazole (PROTONIX) EC tablet 40 mg  40 mg Oral Early Morning   • polyethylene glycol (MIRALAX) packet 17 g 17 g Oral BID   • QUEtiapine (SEROquel) tablet 25 mg  25 mg Oral HS   • senna (SENOKOT) tablet 8 6 mg  1 tablet Oral BID   • sertraline (ZOLOFT) tablet 175 mg  175 mg Oral Daily   • warfarin (COUMADIN) tablet 7 5 mg  7 5 mg Oral Daily (warfarin)           Physical Exam  Constitutional:       General: She is not in acute distress  HENT:      Head: Normocephalic and atraumatic  Cardiovascular:      Rate and Rhythm: Normal rate  Pulmonary:      Effort: Pulmonary effort is normal    Musculoskeletal:      Comments: L AKA with lateral serosang drainage on bad with some excoriation of incision  R AKA appears intact, inferior wound with some fibrinous exudative drainage      Right Lower Extremity: Right leg is amputated above knee  Left Lower Extremity: Left leg is amputated above knee                 Ramez Sanabria MD  12/9/2022

## 2022-12-09 NOTE — OCCUPATIONAL THERAPY NOTE
Occupational Therapy Progress Note(time=1500-1540)     Patient Name: Ashwin DIMASY Date: 12/9/2022  Problem List  Principal Problem:    PAD (peripheral artery disease) ([facility] )  Active Problems:    Acute respiratory failure with hypoxia (HCC)    Depression, recurrent (HCC)    Benign essential hypertension    Stroke ([facility] )    Type 2 diabetes mellitus with hyperglycemia, with long-term current use of insulin (HCC)    Hypokalemia    Acute on chronic anemia    Diabetic ulcer of heel associated with diabetes mellitus due to underlying condition (HCC)    HIT (heparin-induced thrombocytopenia)    Febrile    Diarrhea    Mild protein-calorie malnutrition (HCC)            12/09/22 1500   Note Type   Note Type Treatment   Pain Assessment   Pain Assessment Tool 0-10   Pain Score 7   Pain Location/Orientation Orientation: Bilateral;Location: Leg   Pain Rating: FLACC (Rest) - Face 0   Pain Rating: FLACC (Rest) - Legs 0   Pain Rating: FLACC (Rest) - Activity 0   Pain Rating: FLACC (Rest) - Cry 0   Pain Rating: FLACC (Rest) - Consolability 0   Score: FLACC (Rest) 0   Restrictions/Precautions   Weight Bearing Precautions Per Order Yes   RLE Weight Bearing Per Order NWB   LLE Weight Bearing Per Order NWB   Bed Mobility   Rolling R 2  Maximal assistance   Additional items Assist x 2; Increased time required;Verbal cues;LE management   Rolling L 2  Maximal assistance   Additional items Assist x 2; Increased time required;Verbal cues;LE management   Supine to Sit 2  Maximal assistance   Additional items Assist x 2; Increased time required;Verbal cues;LE management   Sit to Supine 2  Maximal assistance   Additional items Assist x 2; Increased time required;Verbal cues;LE management   Transfers   Sit to Stand Unable to assess   Functional Mobility   Functional Mobility   (continue to recommend [facility] for OOB with nsg)   Therapeutic Excerise-Strength   UE Strength   (pt performed b/l UE AROM dowel exercises(i e chest press, shr circles, shr/elbow flex/ext) 1 set, 10-15reps)   Subjective   Subjective "I being wanting to sit on the edge of the bed for sometime "   Cognition   Overall Cognitive Status [facility]   Arousal/Participation Alert   Attention Within functional limits   Memory Decreased recall of precautions;Decreased short term memory   Following Commands Follows one step commands without difficulty   Activity Tolerance   Activity Tolerance Patient limited by fatigue;Patient limited by pain   Medical Staff Made Aware nsg, P T  Assessment   Assessment Pt seen for 40 min tx session with focus on functional balance, functional mobility, b/l UE strengthening, and education  Pt able to tolerate EOB sitting, sitting balance=p+/p, for 10 mins  Pt continues to require heavy assistance with all functional mobility tasks  Pt able to tolerate AROM exercises well  Therapist reviewed sensory integration techniques with LE's, trunk mobility exercises(i e unsupported sitting in bed using bedrails), and b/l UE strengthening techniques(i e assisting with bed mobility)  Reviewed pain management techniques(i e getting pain meds before tx session) with improved success(i e pt able to tolerate functional mobility today)  Pt continues to demonstrate appropriateness for inpt rehab to improve her overall level of independence  Will continue  Plan   Treatment Interventions ADL retraining;Functional transfer training;UE strengthening/ROM; Endurance training;Cognitive reorientation;Patient/family training;Equipment evaluation/education; Compensatory technique education   Goal Expiration Date 12/14/22   OT Treatment Day 13   OT Frequency 2-3x/wk   Recommendation   OT Discharge Recommendation Post acute rehabilitation services   AM-PAC Daily Activity Inpatient   Lower Body Dressing 1   Bathing 1   Toileting 1   Upper Body Dressing 2   Grooming 3   Eating 3   Daily Activity Raw Score 11   Daily Activity Standardized Score (Calc for Raw Score >=11) 29 04   AM-PAC Applied Cognition Inpatient   Following a Speech/Presentation 3   Understanding Ordinary Conversation 3   Taking Medications 3   Remembering Where Things Are Placed or Put Away 3   Remembering List of 4-5 Errands 3   Taking Care of Complicated Tasks 2   Applied Cognition Raw Score 17   Applied Cognition Standardized Score 36 52   [facility]

## 2022-12-09 NOTE — WOUND OSTOMY CARE
Progress Note - Wound   Neville Card 65 y o  female MRN: [id number]  Unit/Bed#: E4 -01 Encounter: [id number]        Assessment:   Patient seen today for wound care follow up visit  Patient is dependent for all , on P-500 specialty bed  Patient is incontinent of bowel and has indwelling pa catheter  Patient is independent with meals and nutrition is following  1  Left groin puncture site- decrease in size for this week's measurement and unable to truly appreciate wound bed due to small size of wound, small amount of drainage present  Continuing with same treatment, new dressing applied post assessment  No induration, fluctuance, odor, warmth/temperature differences, redness, or purulence noted to the above noted wounds and skin areas assessed  New dressings applied per orders listed below  Patient tolerated well- no s/s of non-verbal pain or discomfort observed during the encounter  Bedside nurse aware of plan of care  See flow sheets for more detailed assessment findings  Wound care will continue to follow  Wound Care Plan:   1-P500 low air-loss mattress  2-Elevate right heel/leg off of bed/chair surface to offload pressure  3-Turn/reposition every 2 hours while in bed using positioning wedges for pressure re-distribution on skin    4-Left AKA and right foot--per podiatry and vascular team   5-Left groin puncture site--cleanse with normal saline, pat dry   Apply Maxorb Ag and dry dressing (lay into fold)   Change dressing daily and as needed with soilage     6-Right thigh/leg--cleanse with normal saline, pat dry   Apply Xeroform to medial/lateral thigh eschars, and adaptic to lower leg open areas/blisters   Cover with ABDs and wrap with rolled gauze    7-Apply Allevyn Life foam dressing to midline sacrum (small sacral) for prevention   Scotty with P   Peel back at least daily for skin assessment and re-apply   Change dressing every 3 days and PRN        Wound 22 Groin Left (Active) Wound Image   12/09/22 1341   Wound Description BRADY 12/09/22 1341   Michaela-wound Assessment Clean;Dry; Intact 12/09/22 1341   Wound Length (cm) 0 2 cm 12/09/22 1341   Wound Width (cm) 0 2 cm 12/09/22 1341   Wound Depth (cm) 0 2 cm 12/09/22 1341   Wound Surface Area (cm^2) 0 04 cm^2 12/09/22 1341   Wound Volume (cm^3) 0 008 cm^3 12/09/22 1341   Calculated Wound Volume (cm^3) 0 01 cm^3 12/09/22 1341   Change in Wound Size % 80 12/09/22 1341   Tunneling 0 cm 12/09/22 1341   Tunneling in depth located at 0 12/09/22 1341   Undermining 0 12/09/22 1341   Undermining is depth extending from 0 12/09/22 1341   Wound Site Closure BRADY 12/09/22 1341   Drainage Amount Scant 12/09/22 1341   Drainage Description Serosanguineous 12/09/22 1341   Non-staged Wound Description Full thickness 12/09/22 1341   Treatments Cleansed 12/09/22 1341   Dressing Calcium Alginate;ABD 12/09/22 1341   Wound packed? No 12/09/22 1341   Packing- # removed 0 12/09/22 1341   Packing- # inserted 0 12/09/22 1341   Dressing Changed New 12/09/22 1341   Patient Tolerance Tolerated well 12/09/22 1341   Dressing Status Clean;Dry; Intact 12/09/22 1341       Martina SAMSONN, RN, [facility]

## 2022-12-09 NOTE — QUICK NOTE
Palliative was consulted days ago to determine goals of care  Patient appeared depressed and admitted to worsening depression, which is affecting her overall demeanor  Re-consulted psych who changed regimen on 12/6  It should be noted that there appears to be a lack of terminal diagnosis at this time to warrant a consideration for hospice  Patient should continue to receive appropriate cares for her acute and chronic medical conditions, including continued psychiatric support  If she continues to refuse cares or participate in cares, primary team to re-evaluate depression and/or capacity and engage psychiatry again for depression/suicidal ideation (if any) or neuropsych for formal capacity evaluation  Palliative currently has no active role in this patient's care  We will sign off       Alva Pascal MD  Palliative Medicine & Supportive Care  Internal Medicine  Available via [facility] Text  Office: [phone]  Fax: [phone]

## 2022-12-09 NOTE — ASSESSMENT & PLAN NOTE
Lab Results   Component Value Date    HGBA1C 9 8 (H) 10/25/2022     Recent Labs     12/08/22  1605 12/08/22 2055 12/09/22  0731 12/09/22  1124   POCGLU 150* 155* 142* 145*     Significantly reduced regimen from home due to poor oral intake  Continue Lantus to 8 units nightly plus sliding scale  Continue to titrate

## 2022-12-09 NOTE — PLAN OF CARE
Problem: OCCUPATIONAL THERAPY ADULT  Goal: Performs self-care activities at highest level of function for planned discharge setting  See evaluation for individualized goals  Description: Treatment Interventions: ADL retraining, Functional transfer training, UE strengthening/ROM, Endurance training, Cognitive reorientation, Patient/family training, Equipment evaluation/education, Compensatory technique education, Energy conservation, Activityengagement          See flowsheet documentation for full assessment, interventions and recommendations  Note: Limitation: Decreased ADL status, Decreased UE strength, Decreased endurance, Decreased high-level ADLs (New L visual field deficits)  Prognosis: Good  Assessment: Pt seen for 40 min tx session with focus on functional balance, functional mobility, b/l UE strengthening, and education  Pt able to tolerate EOB sitting, sitting balance=p+/p, for 10 mins  Pt continues to require heavy assistance with all functional mobility tasks  Pt able to tolerate AROM exercises well  Therapist reviewed sensory integration techniques with LE's, trunk mobility exercises(i e unsupported sitting in bed using bedrails), and b/l UE strengthening techniques(i e assisting with bed mobility)  Reviewed pain management techniques(i e getting pain meds before tx session) with improved success(i e pt able to tolerate functional mobility today)  Pt continues to demonstrate appropriateness for inpt rehab to improve her overall level of independence  Will continue       OT Discharge Recommendation: Post acute rehabilitation services

## 2022-12-09 NOTE — ASSESSMENT & PLAN NOTE
Blood pressure elevated above goal, likely related to pain  Continue Norvasc 10 mg, lisinopril 40 mg daily, metoprolol succinate 25 mg daily  Increase Cardura to 4 mg  Labetalol as needed

## 2022-12-09 NOTE — PROGRESS NOTES
[facility]  [facility] 1960, 65 y o  female MRN: [id number]  Unit/Bed#: E4 -01 Encounter: [id number]  Primary Care Provider: ALEX Son   Date and time admitted to hospital: 10/21/2022  7:58 PM    * PAD (peripheral artery disease) [facility])  Assessment & Plan  65year old female former smoker w/HTN, HLD, uncontrolled type II DM (A1c 9 8), hx CVA, presenting with nonhealing extensive L heel ulceration and R hallux and 5th digit ulceration  Vascular surgery consulted for input  S/p multiple b/l endovascular interventions  JUSTIN  R hemispheric CVA    S/p L AKA 11/23/22 [facility])  S/p R AKA, wound debridement 12/1/22 [facility])      Recommendations:  - Bilateral tissue loss in the setting of uncontrolled type II DM and NYDIA on admission, now s/p multiple angiograms w/intervention    - Endovascular interventions, c/b atheroembolic event to the RLE and JUSTIN with R hemispheric CVA  - Now s/p L AKA on 11/23/22 and R AKA 12/1/22 w/ proximal thigh wound debridements  - Plastic surgery input appreciated  Recommending daily santyl to R thigh wounds and possible future wound VAC to areas  - Continue argatroban drip per protocol will plan to bridge to Coumadin  Heme Onc note noted  -INR 2 27 this AM  Continue argatroban gtt  Will recheck in AM and adjust based on results  -Goal INR is for while bridging to Coumadin with argatroban and once achieved overlap for 5 days, discontinue argatroban drip and recheck INR in 4-6 hours to ensure therapeutic levels  - Medical management with statin, Plavix  - Medical management and glucose control per SLIM  - Continue oral pain regimen, adjusted to long acting which appears to be controlling patient's pain well   - Appreciate psych input and management - addition of wellbutrin and adjustment of zoloft   - Wound care orders placed       Subjective: No acute events overnight  Comfortable this morning   Remains on argatroban gtt  with bridge to Coumadin    INR 2 27 this morning    Vitals:  /59 (BP Location: Left arm)   Pulse 70   Temp (!) 97 3 °F (36 3 °C) (Temporal)   Resp 20   Ht 5' 4" (1 626 m)   Wt 76 9 kg (169 lb 8 5 oz)   SpO2 97%   BMI 29 10 kg/m²     I/Os:  I/O last 3 completed shifts: In: 55 5 [I V :55 5]  Out: 2400 [Urine:2400]  No intake/output data recorded  Lab Results and Cultures:   CBC with diff:   Lab Results   Component Value Date    WBC 12 30 (H) 12/08/2022    HGB 7 7 (L) 12/08/2022    HCT 25 4 (L) 12/08/2022    MCV 85 12/08/2022     (H) 12/08/2022    MCH 25 8 (L) 12/08/2022    MCHC 30 3 (L) 12/08/2022    RDW 16 8 (H) 12/08/2022    MPV 8 1 (L) 12/08/2022    NRBC 0 12/05/2022   ,   BMP/CMP:  Lab Results   Component Value Date    SODIUM 141 12/09/2022    K 4 0 12/09/2022     12/09/2022    CO2 27 12/09/2022    BUN 14 12/09/2022    CREATININE 0 78 12/09/2022    CALCIUM 7 8 (L) 12/09/2022    AST 25 12/02/2022    ALT 12 12/02/2022    ALKPHOS 318 (H) 12/02/2022    EGFR 82 12/09/2022   ,   Lipid Panel: No results found for: CHOL,   Coags:   Lab Results   Component Value Date    PTT 67 (H) 12/09/2022    INR 2 27 (H) 12/09/2022   ,     Blood Culture:   Lab Results   Component Value Date    BLOODCX No Growth After 5 Days   11/13/2022    BLOODCX Staphylococcus coagulase negative (A) 11/13/2022   ,   Urinalysis:   Lab Results   Component Value Date    COLORU Yellow 11/12/2022    CLARITYU Slightly Cloudy 11/12/2022    SPECGRAV 1 025 11/12/2022    PHUR 5 5 11/12/2022    PHUR 6 5 02/23/2018    LEUKOCYTESUR Trace (A) 11/12/2022    NITRITE Positive (A) 11/12/2022    GLUCOSEU Negative 11/12/2022    KETONESU Negative 11/12/2022    BILIRUBINUR Negative 11/12/2022    BLOODU Large (A) 11/12/2022   ,   Urine Culture:   Lab Results   Component Value Date    URINECX >100,000 cfu/ml Enterobacter cloacae complex (A) 11/12/2022    URINECX 70,000-79,000 cfu/ml Kluyveromyces marxianus (A) 11/12/2022   , Wound Culure: No results found for: WOUNDCULT    Medications:  Current Facility-Administered Medications   Medication Dose Route Frequency   • acetaminophen (TYLENOL) tablet 975 mg  975 mg Oral Q6H [facility]   • ALPRAZolam (XANAX) tablet 0 25 mg  0 25 mg Oral Daily PRN   • amLODIPine (NORVASC) tablet 10 mg  10 mg Oral Daily   • argatroban infusion (premix)  0 1-3 mcg/kg/min Intravenous Titrated   • atorvastatin (LIPITOR) tablet 40 mg  40 mg Oral Daily With Dinner   • buPROPion (WELLBUTRIN XL) 24 hr tablet 150 mg  150 mg Oral Daily   • clopidogrel (PLAVIX) tablet 75 mg  75 mg Oral Daily   • collagenase (SANTYL) ointment   Topical Daily   • doxazosin (CARDURA) tablet 4 mg  4 mg Oral Daily   • gabapentin (NEURONTIN) capsule 400 mg  400 mg Oral TID   • insulin glargine (LANTUS) subcutaneous injection 8 Units 0 08 mL  8 Units Subcutaneous HS   • insulin lispro (HumaLOG) 100 units/mL subcutaneous injection 1-6 Units  1-6 Units Subcutaneous TID AC   • labetalol (NORMODYNE) injection 10 mg  10 mg Intravenous Q6H PRN   • lisinopril (ZESTRIL) tablet 40 mg  40 mg Oral Daily   • methocarbamol (ROBAXIN) tablet 1,000 mg  1,000 mg Oral Q8H [facility]   • metoclopramide (REGLAN) injection 10 mg  10 mg Intravenous Q6H PRN   • metoprolol succinate (TOPROL-XL) 24 hr tablet 25 mg  25 mg Oral Daily   • naloxone (NARCAN) 0 04 mg/mL syringe 0 04 mg  0 04 mg Intravenous Q1MIN PRN   • nystatin (MYCOSTATIN) powder   Topical BID   • ondansetron (ZOFRAN) injection 4 mg  4 mg Intravenous Q6H PRN   • oxyCODONE (OxyCONTIN) 12 hr tablet 10 mg  10 mg Oral Q12H MICHELLE   • oxyCODONE (ROXICODONE) IR tablet 2 5 mg  2 5 mg Oral Q4H PRN    Or   • oxyCODONE (ROXICODONE) IR tablet 5 mg  5 mg Oral Q4H PRN    Or   • oxyCODONE (ROXICODONE) IR tablet 7 5 mg  7 5 mg Oral Q4H PRN   • pantoprazole (PROTONIX) EC tablet 40 mg  40 mg Oral Early Morning   • polyethylene glycol (MIRALAX) packet 17 g  17 g Oral BID   • QUEtiapine (SEROquel) tablet 25 mg  25 mg Oral HS   • senna (SENOKOT) tablet 8 6 mg  1 tablet Oral BID   • sertraline (ZOLOFT) tablet 175 mg  175 mg Oral Daily   • warfarin (COUMADIN) tablet 7 5 mg  7 5 mg Oral Daily (warfarin)       Imaging:  Reviewed  Physical Exam:    General: Alert and oriented    In no acute distress  CV: Regular rate  Respiratory: Normal effort  Abdominal: Soft, nondistended  Extremities: Status post bilateral above-knee amputations  Neurologic: Grossly normal        Stella Child MD  12/9/2022

## 2022-12-09 NOTE — ASSESSMENT & PLAN NOTE
65year old female former smoker w/HTN, HLD, uncontrolled type II DM (A1c 9 8), hx CVA, presenting with nonhealing extensive L heel ulceration and R hallux and 5th digit ulceration  Vascular surgery consulted for input  S/p multiple b/l endovascular interventions  JUSTIN  R hemispheric CVA    S/p L AKA 11/23/22 [facility])  S/p R AKA, wound debridement 12/1/22 [facility])      Recommendations:  - Bilateral tissue loss in the setting of uncontrolled type II DM and NYDIA on admission, now s/p multiple angiograms w/intervention    - Endovascular interventions, c/b atheroembolic event to the RLE and JUSTIN with R hemispheric CVA  - Now s/p L AKA on 11/23/22 and R AKA 12/1/22 w/ proximal thigh wound debridements  -Left lateral AKA incision with signs of infection  -We will plan to remove a few staples and monitor progress of left stump  - Plastic surgery input appreciated  Recommending daily santyl to R thigh wounds and possible future wound VAC to areas  - Continue argatroban drip per protocol will plan to bridge to Coumadin  Heme Onc note noted  -f/u INR  Continue argatroban gtt  -Goal INR is for while bridging to Coumadin with argatroban and once achieved overlap for 5 days, discontinue argatroban drip and recheck INR in 4-6 hours to ensure therapeutic levels  - Medical management with statin, Plavix  - Medical management and glucose control per SLIM    - Continue oral pain regimen, adjusted to long acting which appears to be controlling patient's pain well   - Appreciate psych input and management - addition of wellbutrin and adjustment of zoloft   - Wound care orders placed

## 2022-12-09 NOTE — PHYSICAL THERAPY NOTE
PHYSICAL THERAPY NOTE          Patient Name: [address] [place] [address] 110  IGFYX'F Date: 12/9/2022 12/09/22 1520   PT Last Visit   PT Visit Date 12/09/22   Note Type   Note Type Treatment   Pain Assessment   Pain Assessment Tool 0-10   Pain Score 9   Pain Location/Orientation Orientation: Right;Location: Leg   Pain Onset/Description Onset: Ongoing   Patient's Stated Pain Goal No pain   Hospital Pain Intervention(s) Medication (See MAR); Repositioned; Ambulation/increased activity   Restrictions/Precautions   Other Precautions Bed Alarm; Fall Risk;Pain   General   Chart Reviewed Yes   Family/Caregiver Present No   Cognition   Arousal/Participation Alert   Attention Within functional limits   Following Commands Follows one step commands with increased time or repetition   Subjective   Subjective Pt reports increased pain today, but agreeable to try EOB  Bed Mobility   Supine to Sit 2  Maximal assistance   Additional items Assist x 2; Increased time required;Verbal cues;LE management   Sit to Supine 2  Maximal assistance   Additional items Assist x 2; Increased time required;Verbal cues;LE management   Additional Comments VCs for direction and safety   Balance   Static Sitting Poor  (Pt needing max A initially EOB, but able to maintain with min A-mod A for most of time spent EOB  Pt was briefly able to maintain sitting balance EOB with BUE support on rails with close supervision  EOB x 10' total )   Endurance Deficit   Endurance Deficit Yes   Endurance Deficit Description fatigue, pain   Activity Tolerance   Activity Tolerance Patient limited by fatigue;Patient limited by pain   Medical Staff [address], OT; Pt seen for Co-treatment with Occupational Therapist due to pt's medical complexity, functional limitations and limited activity tolerance     Nurse Made Aware yes, EDGAR Lagunas   Assessment   Prognosis Fair   Problem List Decreased strength;Decreased range of motion;Decreased endurance; Impaired balance;Decreased mobility; Decreased safety awareness;Decreased skin integrity;Pain   Assessment Pt seen for PT treatment today  Pt more cooperative with therapy  Pt able to get to EOB today with max A x 2 and sat x 10' with varying amount of assistance  CG --> max A  Pt was able to sit without assistance briefly x 2 with use of BUE on bed rails  Pt is progressing with mobility and activity tolerance  Goals updated  The patient's -[facility] Basic Mobility Inpatient Short Form Raw Score is 8  A Raw score of less than or equal to 16 suggests the patient may benefit from discharge to post-acute rehabilitation services  Please also refer to the recommendation of the Physical Therapist for safe discharge planning  Continue to recommend STR at d/c to maximize safe functional mobility  Goals   STG Expiration Date 12/23/22   Short Term Goal #1 1)  Pt will perform bed mobility with min A demonstrating appropriate technique 100% of the time in order to improve function  2)  Improve overall strength and balance 1/2 grade in order to optimize ability to perform functional tasks and reduce fall risk  3)Increase activity tolerance to 45 minutes  4) PT for ongoing patient and family/caregiver education, DME needs and d/c planning in order to promote highest level of function in least restrictive environment  PT Treatment Day 10   Plan   Treatment/Interventions Endurance training; Therapeutic exercise;Patient/family training;Equipment eval/education; Bed mobility;OT   Progress Slow progress, decreased activity tolerance   PT Frequency 3-5x/wk   Recommendation   PT Discharge Recommendation Post acute rehabilitation services   -[facility] Basic Mobility Inpatient   Turning in Bed Without Bedrails 2   Lying on Back to Sitting on Edge of Flat Bed 2   Moving Bed to Chair 1   Standing Up From Chair 1   Walk in Room 1   Climb 3-5 Stairs 1   Basic Mobility Inpatient Raw Score 8 Turning Head Towards Sound 4   Follow Simple Instructions 4   Low Function Basic Mobility Raw Score 16   Low Function Basic Mobility Standardized Score 25 72   Highest Level Of Mobility   JH-HLM Goal 3: Sit at edge of bed   JH-HLM Achieved 3: Sit at edge of bed   Education   Education Provided Mobility training   Patient Demonstrates acceptance/verbal understanding;Demonstrates verbal understanding   End of Consult   Patient Position at End of Consult Supine; All needs within reach   End of Consult Comments Pt left with OT; call bell in reach       Caitlin Conway

## 2022-12-09 NOTE — ASSESSMENT & PLAN NOTE
Replete  Recent Labs     12/07/22  1800 12/08/22  0514 12/09/22  0352   K 4 6 4 7 4 0     Repleted  Monitor potassium with addition of ACE inhibitor

## 2022-12-09 NOTE — ASSESSMENT & PLAN NOTE
· Positive heparin antibody on 11/11/2020  · Appreciate heme Onc recommendations  · Plan for argatroban to Coumadin bridge  · Increase Coumadin to 7 5 mg nightly  · Trend INR daily

## 2022-12-10 LAB
APTT PPP: 71 SECONDS (ref 23–37)
APTT PPP: 77 SECONDS (ref 23–37)
ERYTHROCYTE [DISTWIDTH] IN BLOOD BY AUTOMATED COUNT: 16.7 % (ref 11.6–15.1)
GLUCOSE SERPL-MCNC: 144 MG/DL (ref 65–140)
GLUCOSE SERPL-MCNC: 153 MG/DL (ref 65–140)
GLUCOSE SERPL-MCNC: 154 MG/DL (ref 65–140)
GLUCOSE SERPL-MCNC: 159 MG/DL (ref 65–140)
HCT VFR BLD AUTO: 25.3 % (ref 34.8–46.1)
HGB BLD-MCNC: 7.7 G/DL (ref 11.5–15.4)
INR PPP: 4.17 (ref 0.84–1.19)
INR PPP: 4.51 (ref 0.84–1.19)
MCH RBC QN AUTO: 26.3 PG (ref 26.8–34.3)
MCHC RBC AUTO-ENTMCNC: 30.4 G/DL (ref 31.4–37.4)
MCV RBC AUTO: 86 FL (ref 82–98)
PLATELET # BLD AUTO: 425 THOUSANDS/UL (ref 149–390)
PMV BLD AUTO: 7.9 FL (ref 8.9–12.7)
PROTHROMBIN TIME: 39.9 SECONDS (ref 11.6–14.5)
PROTHROMBIN TIME: 42.4 SECONDS (ref 11.6–14.5)
RBC # BLD AUTO: 2.93 MILLION/UL (ref 3.81–5.12)
WBC # BLD AUTO: 12.88 THOUSAND/UL (ref 4.31–10.16)

## 2022-12-10 PROCEDURE — 82948 REAGENT STRIP/BLOOD GLUCOSE: CPT

## 2022-12-10 PROCEDURE — NC001 PR NO CHARGE: Performed by: SURGERY

## 2022-12-10 PROCEDURE — 85730 THROMBOPLASTIN TIME PARTIAL: CPT | Performed by: SURGERY

## 2022-12-10 PROCEDURE — 85027 COMPLETE CBC AUTOMATED: CPT | Performed by: SURGERY

## 2022-12-10 PROCEDURE — 99232 SBSQ HOSP IP/OBS MODERATE 35: CPT | Performed by: INTERNAL MEDICINE

## 2022-12-10 PROCEDURE — 85610 PROTHROMBIN TIME: CPT | Performed by: SURGERY

## 2022-12-10 PROCEDURE — 85610 PROTHROMBIN TIME: CPT | Performed by: INTERNAL MEDICINE

## 2022-12-10 RX ORDER — ARGATROBAN 1 MG/ML
.1-3 INJECTION INTRAVENOUS
Status: DISCONTINUED | OUTPATIENT
Start: 2022-12-10 | End: 2022-12-12

## 2022-12-10 RX ORDER — WARFARIN SODIUM 2.5 MG/1
2.5 TABLET ORAL
Status: COMPLETED | OUTPATIENT
Start: 2022-12-10 | End: 2022-12-10

## 2022-12-10 RX ORDER — WARFARIN SODIUM 5 MG/1
5 TABLET ORAL
Status: DISCONTINUED | OUTPATIENT
Start: 2022-12-11 | End: 2022-12-12

## 2022-12-10 RX ORDER — DOXAZOSIN MESYLATE 4 MG/1
4 TABLET ORAL
Status: DISCONTINUED | OUTPATIENT
Start: 2022-12-10 | End: 2023-01-12 | Stop reason: HOSPADM

## 2022-12-10 RX ORDER — WARFARIN SODIUM 5 MG/1
5 TABLET ORAL
Status: DISCONTINUED | OUTPATIENT
Start: 2022-12-10 | End: 2022-12-10

## 2022-12-10 RX ADMIN — METHOCARBAMOL 1000 MG: 500 TABLET ORAL at 06:43

## 2022-12-10 RX ADMIN — ATORVASTATIN CALCIUM 40 MG: 40 TABLET, FILM COATED ORAL at 17:16

## 2022-12-10 RX ADMIN — INSULIN LISPRO 1 UNITS: 100 INJECTION, SOLUTION INTRAVENOUS; SUBCUTANEOUS at 12:47

## 2022-12-10 RX ADMIN — NYSTATIN: 100000 POWDER TOPICAL at 18:14

## 2022-12-10 RX ADMIN — INSULIN GLARGINE 8 UNITS: 100 INJECTION, SOLUTION SUBCUTANEOUS at 22:16

## 2022-12-10 RX ADMIN — ARGATROBAN 0.02 MCG/KG/MIN: 50 INJECTION, SOLUTION INTRAVENOUS at 15:36

## 2022-12-10 RX ADMIN — GABAPENTIN 400 MG: 400 CAPSULE ORAL at 17:16

## 2022-12-10 RX ADMIN — QUETIAPINE FUMARATE 25 MG: 25 TABLET ORAL at 22:16

## 2022-12-10 RX ADMIN — COLLAGENASE SANTYL: 250 OINTMENT TOPICAL at 10:00

## 2022-12-10 RX ADMIN — METHOCARBAMOL 1000 MG: 500 TABLET ORAL at 14:42

## 2022-12-10 RX ADMIN — METHOCARBAMOL 1000 MG: 500 TABLET ORAL at 22:16

## 2022-12-10 RX ADMIN — OXYCODONE 7.5 MG: 5 TABLET ORAL at 09:27

## 2022-12-10 RX ADMIN — DOXAZOSIN 4 MG: 4 TABLET ORAL at 22:17

## 2022-12-10 RX ADMIN — OXYCODONE 5 MG: 5 TABLET ORAL at 17:16

## 2022-12-10 RX ADMIN — INSULIN LISPRO 1 UNITS: 100 INJECTION, SOLUTION INTRAVENOUS; SUBCUTANEOUS at 10:00

## 2022-12-10 RX ADMIN — CLOPIDOGREL BISULFATE 75 MG: 75 TABLET ORAL at 10:00

## 2022-12-10 RX ADMIN — NYSTATIN: 100000 POWDER TOPICAL at 10:00

## 2022-12-10 RX ADMIN — ACETAMINOPHEN 975 MG: 325 TABLET, FILM COATED ORAL at 18:13

## 2022-12-10 RX ADMIN — ACETAMINOPHEN 975 MG: 325 TABLET, FILM COATED ORAL at 12:49

## 2022-12-10 RX ADMIN — SERTRALINE HYDROCHLORIDE 175 MG: 100 TABLET ORAL at 10:00

## 2022-12-10 RX ADMIN — PANTOPRAZOLE SODIUM 40 MG: 40 TABLET, DELAYED RELEASE ORAL at 06:43

## 2022-12-10 RX ADMIN — AMLODIPINE BESYLATE 10 MG: 10 TABLET ORAL at 10:00

## 2022-12-10 RX ADMIN — LISINOPRIL 40 MG: 20 TABLET ORAL at 10:00

## 2022-12-10 RX ADMIN — GABAPENTIN 400 MG: 400 CAPSULE ORAL at 10:00

## 2022-12-10 RX ADMIN — BUPROPION HYDROCHLORIDE 150 MG: 150 TABLET, FILM COATED, EXTENDED RELEASE ORAL at 10:00

## 2022-12-10 RX ADMIN — OXYCODONE HYDROCHLORIDE 10 MG: 10 TABLET, FILM COATED, EXTENDED RELEASE ORAL at 10:00

## 2022-12-10 RX ADMIN — OXYCODONE HYDROCHLORIDE 10 MG: 10 TABLET, FILM COATED, EXTENDED RELEASE ORAL at 20:32

## 2022-12-10 RX ADMIN — LABETALOL HYDROCHLORIDE 10 MG: 5 INJECTION, SOLUTION INTRAVENOUS at 03:59

## 2022-12-10 RX ADMIN — WARFARIN SODIUM 2.5 MG: 2.5 TABLET ORAL at 17:16

## 2022-12-10 RX ADMIN — METOPROLOL SUCCINATE 25 MG: 25 TABLET, EXTENDED RELEASE ORAL at 10:00

## 2022-12-10 RX ADMIN — GABAPENTIN 400 MG: 400 CAPSULE ORAL at 20:32

## 2022-12-10 RX ADMIN — ACETAMINOPHEN 975 MG: 325 TABLET, FILM COATED ORAL at 03:58

## 2022-12-10 NOTE — ASSESSMENT & PLAN NOTE
Blood pressure elevated above goal  Blood pressure well controlled in the afternoon and evening and elevated in the morning  Continue Norvasc 10 mg, lisinopril 40 mg daily, metoprolol succinate 25 mg daily  Increase Cardura to 4 mg and dose QHS  Labetalol as needed

## 2022-12-10 NOTE — PLAN OF CARE
Problem: Potential for Falls  Goal: Patient will remain free of falls  Description: INTERVENTIONS:  - Educate patient/family on patient safety including physical limitations  - Instruct patient to call for assistance with activity   - Consult OT/PT to assist with strengthening/mobility   - Keep Call bell within reach  - Keep bed low and locked with side rails adjusted as appropriate  - Keep care items and personal belongings within reach  - Initiate and maintain comfort rounds  - Make Fall Risk Sign visible to staff  - Offer Toileting every 2 Hours, in advance of need  - Initiate/Maintain bed  chair alarm  - Obtain necessary fall risk management equipment: bed chair alarm, call bell, gripper sock, walker, bedside commode  - Apply yellow socks and bracelet for high fall risk patients  - Consider moving patient to room near nurses station  Outcome: Progressing     Problem: PAIN - ADULT  Goal: Verbalizes/displays adequate comfort level or baseline comfort level  Description: Interventions:  - Encourage patient to monitor pain and request assistance  - Assess pain using appropriate pain scale  - Administer analgesics based on type and severity of pain and evaluate response  - Implement non-pharmacological measures as appropriate and evaluate response  - Consider cultural and social influences on pain and pain management  - Notify physician/advanced practitioner if interventions unsuccessful or patient reports new pain  Outcome: Progressing     Problem: SAFETY ADULT  Goal: Patient will remain free of falls  Description: INTERVENTIONS:  - Educate patient/family on patient safety including physical limitations  - Instruct patient to call for assistance with activity   - Consult OT/PT to assist with strengthening/mobility   - Keep Call bell within reach  - Keep bed low and locked with side rails adjusted as appropriate  - Keep care items and personal belongings within reach  - Initiate and maintain comfort rounds  - Make Fall Risk Sign visible to staff  - Offer Toileting every 2 Hours, in advance of need  - Initiate/Maintain bed  chair alarm  - Obtain necessary fall risk management equipment: bed chair alarm, call bell, gripper sock, walker, bedside commode  - Apply yellow socks and bracelet for high fall risk patients  - Consider moving patient to room near nurses station  Outcome: Progressing     Problem: METABOLIC, FLUID AND ELECTROLYTES - ADULT  Goal: Electrolytes maintained within normal limits  Description: INTERVENTIONS:  - Monitor labs and assess patient for signs and symptoms of electrolyte imbalances  - Administer electrolyte replacement as ordered  - Monitor response to electrolyte replacements, including repeat lab results as appropriate  - Instruct patient on fluid and nutrition as appropriate  Outcome: Progressing

## 2022-12-10 NOTE — ASSESSMENT & PLAN NOTE
Lab Results   Component Value Date    HGBA1C 9 8 (H) 10/25/2022     Recent Labs     12/09/22  1743 12/09/22  2134 12/10/22  0830 12/10/22  1148   POCGLU 173* 157* 154* 153*     Significantly reduced regimen from home due to poor oral intake  Continue Lantus to 8 units nightly plus sliding scale  Continue to titrate

## 2022-12-10 NOTE — PROGRESS NOTES
[facility]  [facility] 1960, 65 y [facility]  female MRN: [id number]  Unit/Bed#: E4 -01 Encounter: [id number]  Primary Care Provider: ALEX Thomas   Date and time admitted to hospital: 10/21/2022  7:58 PM    * PAD (peripheral artery disease) [facility])  Assessment & Plan  · Initially admitted for nonhealing wound of the left lower extremity, requiring multiple procedures ultimately required left AKA on 11/23/2022 and right AKA on 12/1/22  · Postoperative wound and pain management per primary service, plastic surgery  · PT/OT  · Eventual short-term rehab placement  · Appreciate psychiatry recommendations    HIT (heparin-induced thrombocytopenia)  Assessment & Plan  · Positive heparin antibody on 11/11/2020  · Appreciate heme Onc recommendations  · Ongoing argatroban to Coumadin bridge  · INR is therapeutic today  Discussed case with hematology oncology on-call  Continue argatroban drip for another 48 hours for total 5 days of overlap  · INR is therapeutic today at 4 is likely reflective of 5 mg Coumadin dose  Resume 5 mg Coumadin nightly    Acute on chronic anemia  Assessment & Plan  Hemoglobin stable    Hypokalemia  Assessment & Plan  Replete  Recent Labs     12/07/22  1800 12/08/22  0514 12/09/22  0352   K 4 6 4 7 4 0     Repleted  Monitor potassium with addition of ACE inhibitor    Type 2 diabetes mellitus with hyperglycemia, with long-term current use of insulin [facility])  Assessment & Plan  Lab Results   Component Value Date    HGBA1C 9 8 (H) 10/25/2022     Recent Labs     12/09/22  1743 12/09/22  2134 12/10/22  0830 12/10/22  1148   POCGLU 173* 157* 154* 153*     Significantly reduced regimen from home due to poor oral intake  Continue Lantus to 8 units nightly plus sliding scale  Continue to titrate      Stroke [facility])  Assessment & Plan  · Noted to have change in mental status on 11/13/2022    · CT confirmed moderate right parietal lobe stroke, likely embolic in the setting of HIT  · Heme Onc recommends Coumadin  Currently on argatroban bridge to Coumadin    Benign essential hypertension  Assessment & Plan  Blood pressure elevated above goal  Blood pressure well controlled in the afternoon and evening and elevated in the morning  Continue Norvasc 10 mg, lisinopril 40 mg daily, metoprolol succinate 25 mg daily  Increase Cardura to 4 mg and dose QHS  Labetalol as needed      Depression, recurrent ([address] )  Assessment & Plan  Evaluated by Psychiatry x 2 on this admission  · Continue medications recommended by psychiatry    Acute respiratory failure with hypoxia [facility])  Assessment & Plan  Multifactorial secondary to atelectasis, hypoventilation and sedentary  Resolved  VTE Pharmacologic Prophylaxis: Argatroban to Coumadin    Patient Centered Rounds:  Patient care rounds were performed with nursing    Discussions with Specialists or Other Care Team Provider: Hematology/oncology    Time Spent for Care: 30  More than 50% of total time spent on counseling and coordination of care as described above  Current Length of Stay: 49 day(s)    Current Patient Status: Inpatient   Certification Statement: The patient will continue to require additional inpatient hospital stay due to argatroban drip, postoperative care    Discharge Plan: Per primary team    Code Status: Level 1 - Full Code      Subjective:   Patient seen and evaluated at bedside  No overnight events  He feels improved with OxyContin    Objective:     Vitals:   Temp (24hrs), Av 9 °F (36 6 °C), Min:97 5 °F (36 4 °C), Max:98 3 °F (36 8 °C)    Temp:  [97 5 °F (36 4 °C)-98 3 °F (36 8 °C)] 97 5 °F (36 4 °C)  HR:  [81-87] 82  Resp:  [18-20] 18  BP: (137-177)/(72-79) 137/79  SpO2:  [94 %-98 %] 97 %  Body mass index is 29 1 kg/m²  Input and Output Summary (last 24 hours):        Intake/Output Summary (Last 24 hours) at 12/10/2022 1221  Last data filed at 12/10/2022 0631  Gross per 24 hour   Intake -- Output 700 ml   Net -700 ml       Physical Exam:     Physical Exam  Vitals reviewed  Constitutional:       General: She is not in acute distress  Appearance: She is well-developed  She is not ill-appearing, toxic-appearing or diaphoretic  HENT:      Head: Normocephalic and atraumatic  Mouth/Throat:      Mouth: Mucous membranes are moist    Eyes:      General: No scleral icterus  Extraocular Movements: Extraocular movements intact  Cardiovascular:      Rate and Rhythm: Normal rate and regular rhythm  Heart sounds: Normal heart sounds  Pulmonary:      Effort: Pulmonary effort is normal  No respiratory distress  Breath sounds: Normal breath sounds  No wheezing or rales  Abdominal:      General: There is no distension  Palpations: Abdomen is soft  Tenderness: There is no abdominal tenderness  There is no guarding or rebound  Musculoskeletal:      Comments: Bilateral lower extremity AKA   Skin:     General: Skin is warm and dry  Neurological:      General: No focal deficit present  Mental Status: She is alert  Mental status is at baseline  Psychiatric:         Mood and Affect: Mood normal          Behavior: Behavior normal          Thought Content: Thought content normal          Judgment: Judgment normal          Additional Data:     Labs: I have reviewed pertinent results     Results from last 7 days   Lab Units 12/10/22  0650 12/06/22  0507 12/05/22  0546   WBC Thousand/uL 12 88*   < > 13 27*   HEMOGLOBIN g/dL 7 7*   < > 8 5*   HEMATOCRIT % 25 3*   < > 27 8*   PLATELETS Thousands/uL 425*   < > 447*   NEUTROS PCT %  --   --  85*   LYMPHS PCT %  --   --  6*   MONOS PCT %  --   --  6   EOS PCT %  --   --  2    < > = values in this interval not displayed       Results from last 7 days   Lab Units 12/09/22  0352   SODIUM mmol/L 141   POTASSIUM mmol/L 4 0   CHLORIDE mmol/L 105   CO2 mmol/L 27   BUN mg/dL 14   CREATININE mg/dL 0 78   ANION GAP mmol/L 9   CALCIUM mg/dL 7 8*   GLUCOSE RANDOM mg/dL 142*     Results from last 7 days   Lab Units 12/10/22  0650   INR  4 17*     Results from last 7 days   Lab Units 12/10/22  1148 12/10/22  0830 12/09/22  2134 12/09/22  1743 12/09/22  1552 12/09/22  1124 12/09/22  0731 12/08/22  2055 12/08/22  1605 12/08/22  1123 12/08/22  0753 12/07/22 2058   POC GLUCOSE mg/dl 153* 154* 157* 173* 158* 145* 142* 155* 150* 138 136 150*                 Imaging: I have reviewed pertinent imaging       Recent Cultures (last 7 days):           Last 24 Hours Medication List:   Current Facility-Administered Medications   Medication Dose Route Frequency Provider Last Rate   • acetaminophen  975 mg Oral Q6H [facility] Mraixa Dorsey MD     • ALPRAZolam  0 25 mg Oral Daily PRN Ashlie Soto PA-C     • aluminum-magnesium hydroxide-simethicone  30 mL Oral Q4H PRN Ambika Dumont DO     • amLODIPine  10 mg Oral Daily Ashlie Soto PA-C     • argatroban  0 1-3 mcg/kg/min Intravenous Titrated Fabrizio Hollis MD     • atorvastatin  40 mg Oral Daily With Dinner Ashlie Soto PA-C     • buPROPion  150 mg Oral Daily Maryann Thompson MD     • clopidogrel  75 mg Oral Daily Ashlie Soto PA-C     • collagenase   Topical Daily Mandeep Anderson MD     • doxazosin  4 mg Oral HS Ambika Dumont DO     • gabapentin  400 mg Oral TID Marixa Dorsey MD     • insulin glargine  8 Units Subcutaneous HS Ambika Dumont DO     • insulin lispro  1-6 Units Subcutaneous TID [facility] Ashlie Soto PA-C     • labetalol  10 mg Intravenous Q6H PRN Ambika Dumont DO     • lisinopril  40 mg Oral Daily Ambika Dumont DO     • methocarbamol  1,000 mg Oral [facility] Marixa Dorsey MD     • metoclopramide  10 mg Intravenous Q6H PRN Ashlie Soto PA-C     • metoprolol succinate  25 mg Oral Daily Ambika Dumont DO     • naloxone  0 04 mg Intravenous Q1MIN PRN Ashlie Soto PA-C     • nystatin   Topical BID Ashlie Rho, PA-C     • ondansetron  4 mg Intravenous Q6H PRN Pilo Moreau PA-C     • oxyCODONE  10 mg Oral Q12H [address] Ann Howell MD     • oxyCODONE  2 5 mg Oral Q4H PRN Ann Howell MD      Or   • oxyCODONE  5 mg Oral Q4H PRN Ann Howell MD      Or   • oxyCODONE  7 5 mg Oral Q4H PRN Ann Howell MD     • pantoprazole  40 mg Oral Early Morning Pilo Moreau PA-C     • polyethylene glycol  17 g Oral BID Pilo Moreau PA-C     • QUEtiapine  25 mg Oral HS Pilo Moreau PA-C     • senna  1 tablet Oral BID Pilo Moreau PA-C     • sertraline  175 mg Oral Daily Beryle Shu, MD     • warfarin  5 mg Oral Daily (warfarin) Saadia Delarosa DO          Today, Patient Was Seen By: Saadia Delarosa DO    ** Please Note: Dictation voice to text software may have been used in the creation of this document   **

## 2022-12-10 NOTE — ASSESSMENT & PLAN NOTE
· Positive heparin antibody on 11/11/2020  · Appreciate heme Onc recommendations  · Ongoing argatroban to Coumadin bridge  · INR is therapeutic today  Discussed case with hematology oncology on-call  Continue argatroban drip for another 48 hours for total 5 days of overlap  · INR is therapeutic today at 4 is likely reflective of 5 mg Coumadin dose    Resume 5 mg Coumadin nightly

## 2022-12-10 NOTE — PROGRESS NOTES
Pt was confused and did not believe her left leg had been amputated  She insisted it was still there       -NB

## 2022-12-11 VITALS
SYSTOLIC BLOOD PRESSURE: 160 MMHG | HEART RATE: 107 BPM | BODY MASS INDEX: 28.94 KG/M2 | TEMPERATURE: 98.5 F | RESPIRATION RATE: 18 BRPM | WEIGHT: 169.53 LBS | OXYGEN SATURATION: 94 % | HEIGHT: 64 IN | DIASTOLIC BLOOD PRESSURE: 70 MMHG

## 2022-12-11 PROBLEM — J96.01 ACUTE RESPIRATORY FAILURE WITH HYPOXIA (HCC): Status: RESOLVED | Noted: 2018-02-24 | Resolved: 2022-12-11

## 2022-12-11 LAB
ANION GAP SERPL CALCULATED.3IONS-SCNC: 8 MMOL/L (ref 4–13)
APTT PPP: 73 SECONDS (ref 23–37)
APTT PPP: 77 SECONDS (ref 23–37)
BUN SERPL-MCNC: 12 MG/DL (ref 5–25)
CALCIUM SERPL-MCNC: 8.4 MG/DL (ref 8.3–10.1)
CHLORIDE SERPL-SCNC: 105 MMOL/L (ref 96–108)
CO2 SERPL-SCNC: 28 MMOL/L (ref 21–32)
CREAT SERPL-MCNC: 0.8 MG/DL (ref 0.6–1.3)
ERYTHROCYTE [DISTWIDTH] IN BLOOD BY AUTOMATED COUNT: 16.8 % (ref 11.6–15.1)
GFR SERPL CREATININE-BSD FRML MDRD: 79 ML/MIN/1.73SQ M
GLUCOSE SERPL-MCNC: 128 MG/DL (ref 65–140)
GLUCOSE SERPL-MCNC: 142 MG/DL (ref 65–140)
GLUCOSE SERPL-MCNC: 149 MG/DL (ref 65–140)
GLUCOSE SERPL-MCNC: 150 MG/DL (ref 65–140)
GLUCOSE SERPL-MCNC: 178 MG/DL (ref 65–140)
HCT VFR BLD AUTO: 24.9 % (ref 34.8–46.1)
HGB BLD-MCNC: 7.4 G/DL (ref 11.5–15.4)
INR PPP: 4.45 (ref 0.84–1.19)
MCH RBC QN AUTO: 25.7 PG (ref 26.8–34.3)
MCHC RBC AUTO-ENTMCNC: 29.7 G/DL (ref 31.4–37.4)
MCV RBC AUTO: 87 FL (ref 82–98)
PLATELET # BLD AUTO: 416 THOUSANDS/UL (ref 149–390)
PMV BLD AUTO: 8 FL (ref 8.9–12.7)
POTASSIUM SERPL-SCNC: 3.6 MMOL/L (ref 3.5–5.3)
PROTHROMBIN TIME: 42 SECONDS (ref 11.6–14.5)
RBC # BLD AUTO: 2.88 MILLION/UL (ref 3.81–5.12)
SODIUM SERPL-SCNC: 141 MMOL/L (ref 135–147)
WBC # BLD AUTO: 12.63 THOUSAND/UL (ref 4.31–10.16)

## 2022-12-11 PROCEDURE — 85730 THROMBOPLASTIN TIME PARTIAL: CPT | Performed by: SURGERY

## 2022-12-11 PROCEDURE — 82948 REAGENT STRIP/BLOOD GLUCOSE: CPT

## 2022-12-11 PROCEDURE — 85610 PROTHROMBIN TIME: CPT | Performed by: INTERNAL MEDICINE

## 2022-12-11 PROCEDURE — 99024 POSTOP FOLLOW-UP VISIT: CPT | Performed by: SURGERY

## 2022-12-11 PROCEDURE — 85027 COMPLETE CBC AUTOMATED: CPT | Performed by: INTERNAL MEDICINE

## 2022-12-11 PROCEDURE — 80048 BASIC METABOLIC PNL TOTAL CA: CPT | Performed by: INTERNAL MEDICINE

## 2022-12-11 PROCEDURE — 99232 SBSQ HOSP IP/OBS MODERATE 35: CPT | Performed by: INTERNAL MEDICINE

## 2022-12-11 RX ORDER — CEFAZOLIN SODIUM 2 G/50ML
2000 SOLUTION INTRAVENOUS EVERY 8 HOURS
Status: COMPLETED | OUTPATIENT
Start: 2022-12-11 | End: 2022-12-16

## 2022-12-11 RX ORDER — ACETAMINOPHEN 325 MG/1
650 TABLET ORAL EVERY 6 HOURS PRN
Status: DISCONTINUED | OUTPATIENT
Start: 2022-12-11 | End: 2023-01-12 | Stop reason: HOSPADM

## 2022-12-11 RX ADMIN — METOPROLOL SUCCINATE 25 MG: 25 TABLET, EXTENDED RELEASE ORAL at 08:14

## 2022-12-11 RX ADMIN — OXYCODONE 5 MG: 5 TABLET ORAL at 12:32

## 2022-12-11 RX ADMIN — AMLODIPINE BESYLATE 10 MG: 10 TABLET ORAL at 08:14

## 2022-12-11 RX ADMIN — ACETAMINOPHEN 975 MG: 325 TABLET, FILM COATED ORAL at 05:22

## 2022-12-11 RX ADMIN — METHOCARBAMOL 1000 MG: 500 TABLET ORAL at 17:55

## 2022-12-11 RX ADMIN — OXYCODONE HYDROCHLORIDE 10 MG: 10 TABLET, FILM COATED, EXTENDED RELEASE ORAL at 08:15

## 2022-12-11 RX ADMIN — GABAPENTIN 400 MG: 400 CAPSULE ORAL at 08:14

## 2022-12-11 RX ADMIN — WARFARIN SODIUM 5 MG: 5 TABLET ORAL at 17:54

## 2022-12-11 RX ADMIN — QUETIAPINE FUMARATE 25 MG: 25 TABLET ORAL at 21:02

## 2022-12-11 RX ADMIN — GABAPENTIN 400 MG: 400 CAPSULE ORAL at 20:43

## 2022-12-11 RX ADMIN — BUPROPION HYDROCHLORIDE 150 MG: 150 TABLET, FILM COATED, EXTENDED RELEASE ORAL at 08:15

## 2022-12-11 RX ADMIN — METHOCARBAMOL 1000 MG: 500 TABLET ORAL at 21:02

## 2022-12-11 RX ADMIN — INSULIN LISPRO 1 UNITS: 100 INJECTION, SOLUTION INTRAVENOUS; SUBCUTANEOUS at 08:15

## 2022-12-11 RX ADMIN — METHOCARBAMOL 1000 MG: 500 TABLET ORAL at 05:22

## 2022-12-11 RX ADMIN — CLOPIDOGREL BISULFATE 75 MG: 75 TABLET ORAL at 08:15

## 2022-12-11 RX ADMIN — PANTOPRAZOLE SODIUM 40 MG: 40 TABLET, DELAYED RELEASE ORAL at 05:22

## 2022-12-11 RX ADMIN — CEFAZOLIN SODIUM 2000 MG: 2 SOLUTION INTRAVENOUS at 12:33

## 2022-12-11 RX ADMIN — GABAPENTIN 400 MG: 400 CAPSULE ORAL at 17:54

## 2022-12-11 RX ADMIN — DOXAZOSIN 4 MG: 4 TABLET ORAL at 21:01

## 2022-12-11 RX ADMIN — INSULIN GLARGINE 8 UNITS: 100 INJECTION, SOLUTION SUBCUTANEOUS at 21:02

## 2022-12-11 RX ADMIN — ACETAMINOPHEN 975 MG: 325 TABLET, FILM COATED ORAL at 12:31

## 2022-12-11 RX ADMIN — NYSTATIN: 100000 POWDER TOPICAL at 17:57

## 2022-12-11 RX ADMIN — LISINOPRIL 40 MG: 20 TABLET ORAL at 08:15

## 2022-12-11 RX ADMIN — CEFAZOLIN SODIUM 2000 MG: 2 SOLUTION INTRAVENOUS at 17:52

## 2022-12-11 RX ADMIN — ACETAMINOPHEN 975 MG: 325 TABLET, FILM COATED ORAL at 00:42

## 2022-12-11 RX ADMIN — SERTRALINE HYDROCHLORIDE 175 MG: 100 TABLET ORAL at 08:14

## 2022-12-11 RX ADMIN — COLLAGENASE SANTYL: 250 OINTMENT TOPICAL at 08:17

## 2022-12-11 RX ADMIN — OXYCODONE HYDROCHLORIDE 10 MG: 10 TABLET, FILM COATED, EXTENDED RELEASE ORAL at 20:43

## 2022-12-11 RX ADMIN — ATORVASTATIN CALCIUM 40 MG: 40 TABLET, FILM COATED ORAL at 17:54

## 2022-12-11 RX ADMIN — NYSTATIN: 100000 POWDER TOPICAL at 08:17

## 2022-12-11 NOTE — ASSESSMENT & PLAN NOTE
Evaluated by Psychiatry x 2 on this admission  ·  continue Zoloft 175 mg daily, Seroquel 25 mg at bedtime

## 2022-12-11 NOTE — PLAN OF CARE
Problem: PAIN - ADULT  Goal: Verbalizes/displays adequate comfort level or baseline comfort level  Description: Interventions:  - Encourage patient to monitor pain and request assistance  - Assess pain using appropriate pain scale  - Administer analgesics based on type and severity of pain and evaluate response  - Implement non-pharmacological measures as appropriate and evaluate response  - Consider cultural and social influences on pain and pain management  - Notify physician/advanced practitioner if interventions unsuccessful or patient reports new pain  Outcome: Progressing     Problem: SAFETY ADULT  Goal: Patient will remain free of falls  Description: INTERVENTIONS:  - Educate patient/family on patient safety including physical limitations  - Instruct patient to call for assistance with activity   - Consult OT/PT to assist with strengthening/mobility   - Keep Call bell within reach  - Keep bed low and locked with side rails adjusted as appropriate  - Keep care items and personal belongings within reach  - Initiate and maintain comfort rounds  - Make Fall Risk Sign visible to staff  - Offer Toileting every 2 Hours, in advance of need  - Initiate/Maintain bed  chair alarm  - Obtain necessary fall risk management equipment: bed chair alarm, call bell, gripper sock, walker, bedside commode  - Apply yellow socks and bracelet for high fall risk patients  - Consider moving patient to room near nurses station  Outcome: Progressing

## 2022-12-11 NOTE — ASSESSMENT & PLAN NOTE
Lab Results   Component Value Date    HGBA1C 9 8 (H) 10/25/2022     Recent Labs     12/10/22  1604 12/10/22  2057 12/11/22  0735 12/11/22  1055   POCGLU 144* 159* 178* 128     Significantly reduced regimen from home due to poor oral intake  Continue Lantus to 8 units nightly plus sliding scale  Adjust  insulin regimen as needed

## 2022-12-11 NOTE — ASSESSMENT & PLAN NOTE
· Initially admitted for nonhealing wound of the left lower extremity, requiring multiple procedures ultimately required left AKA on 11/23/2022 and right AKA on 12/1/22  · Postoperative wound and pain management per primary service, plastic surgery  · Pain is  better controlled since her surgery  · She is almost at the limit for Tylenol dose per day    We will decrease to prevent liver toxicity  · Eventual short-term rehab placement

## 2022-12-11 NOTE — ASSESSMENT & PLAN NOTE
· Positive heparin antibody on 11/11/2022  · Ongoing argatroban to Coumadin bridge  · INR is therapeutic today  Discussed case with hematology oncology on-call    Continue argatroban drip for another 24 hours for total 5 days of overlap  · Continue with 5 mg Coumadin nightly

## 2022-12-11 NOTE — PROGRESS NOTES
[facility]  [facility] [address] 1960, 65 y [facility]  female MRN: [id number]  Unit/Bed#: E4 -01 Encounter: [id number]  Primary Care Provider: ALEX Cruz   Date and time admitted to hospital: 10/21/2022  7:58 PM    * PAD (peripheral artery disease) [facility])  Assessment & Plan  · Initially admitted for nonhealing wound of the left lower extremity, requiring multiple procedures ultimately required left AKA on 11/23/2022 and right AKA on 12/1/22  · Postoperative wound and pain management per primary service, plastic surgery  · Pain is  better controlled since her surgery  · She is almost at the limit for Tylenol dose per day  We will decrease to prevent liver toxicity  · Eventual short-term rehab placement    Stroke [facility])  Assessment & Plan  · Noted to have change in mental status on 11/13/2022  · CT confirmed moderate right parietal lobe stroke, likely embolic in the setting of HIT with thrombosis  · Heme Onc recommended Coumadin  Currently on argatroban bridge to Coumadin    HIT (heparin-induced thrombocytopenia)  Assessment & Plan  · Positive heparin antibody on 11/11/2022  · Ongoing argatroban to Coumadin bridge  · INR is therapeutic today  Discussed case with hematology oncology on-call    Continue argatroban drip for another 24 hours for total 5 days of overlap  · Continue with 5 mg Coumadin nightly    Type 2 diabetes mellitus with hyperglycemia, with long-term current use of insulin [facility])  Assessment & Plan  Lab Results   Component Value Date    HGBA1C 9 8 (H) 10/25/2022     Recent Labs     12/10/22  1604 12/10/22  2057 12/11/22  0735 12/11/22  1055   POCGLU 144* 159* 178* 128     Significantly reduced regimen from home due to poor oral intake  Continue Lantus to 8 units nightly plus sliding scale  Adjust  insulin regimen as needed     Acute on chronic anemia  Assessment & Plan  · Monitor and  transfuse as needed    Depression, recurrent ([facility] [address] )  Assessment & Plan  Evaluated by Psychiatry x 2 on this admission  ·  continue Zoloft 175 mg daily, Seroquel 25 mg at bedtime  Acute respiratory failure with hypoxia (HCC)-resolved as of 2022  Assessment & Plan  Multifactorial secondary to atelectasis, hypoventilation and sedentary  Resolved  VTE Pharmacologic Prophylaxis:   Pharmacologic: Argatroban  Mechanical VTE Prophylaxis in Place: No    Patient Centered Rounds: I have performed bedside rounds with nursing staff today  Discussions with Specialists or Other Care Team Provider: Hospital course reviewed    Time Spent for Care: 20 minutes  More than 50% of total time spent on counseling and coordination of care as described above  Current Length of Stay: 50 day(s)    Current Patient Status: Inpatient     Code Status: Level 1 - Full Code      Subjective:   Seen and examined during rounds  No shortness of breath  Requiring supplemental oxygen  Reports that overall her pain is much better     Objective:     Vitals:   Temp (24hrs), Av 8 °F (36 6 °C), Min:97 4 °F (36 3 °C), Max:98 °F (36 7 °C)    Temp:  [97 4 °F (36 3 °C)-98 °F (36 7 °C)] 97 4 °F (36 3 °C)  HR:  [81-98] 81  Resp:  [18] 18  BP: (143-164)/(69-70) 143/70  SpO2:  [95 %-97 %] 95 %  Body mass index is 29 1 kg/m²  Input and Output Summary (last 24 hours): Intake/Output Summary (Last 24 hours) at 2022 1326  Last data filed at 2022 0701  Gross per 24 hour   Intake --   Output 1000 ml   Net -1000 ml       Physical Exam:     Physical Exam  Constitutional:       Appearance: She is not ill-appearing  HENT:      Head: Normocephalic and atraumatic  Eyes:      General: No scleral icterus  Cardiovascular:      Rate and Rhythm: Normal rate and regular rhythm  Pulmonary:      Effort: No respiratory distress  Breath sounds: No wheezing or rales  Abdominal:      General: Abdomen is flat  Musculoskeletal:         General: Deformity present  Cervical back: Neck supple  Comments: Bilateral AKA   Neurological:      Mental Status: She is alert and oriented to person, place, and time  Comments: Awake alert follows commands   Psychiatric:         Mood and Affect: Mood normal          Behavior: Behavior normal        Additional Data:     Labs:    Results from last 7 days   Lab Units 12/11/22  0533 12/06/22  0507 12/05/22  0546   WBC Thousand/uL 12 63*   < > 13 27*   HEMOGLOBIN g/dL 7 4*   < > 8 5*   HEMATOCRIT % 24 9*   < > 27 8*   PLATELETS Thousands/uL 416*   < > 447*   NEUTROS PCT %  --   --  85*   LYMPHS PCT %  --   --  6*   MONOS PCT %  --   --  6   EOS PCT %  --   --  2    < > = values in this interval not displayed  Results from last 7 days   Lab Units 12/11/22  0533   SODIUM mmol/L 141   POTASSIUM mmol/L 3 6   CHLORIDE mmol/L 105   CO2 mmol/L 28   BUN mg/dL 12   CREATININE mg/dL 0 80   ANION GAP mmol/L 8   CALCIUM mg/dL 8 4   GLUCOSE RANDOM mg/dL 150*     Results from last 7 days   Lab Units 12/11/22  0533   INR  4 45*     Results from last 7 days   Lab Units 12/11/22  1055 12/11/22  0735 12/10/22  2057 12/10/22  1604 12/10/22  1148 12/10/22  0830 12/09/22  2134 12/09/22  1743 12/09/22  1552 12/09/22  1124 12/09/22  0731 12/08/22  2055   POC GLUCOSE mg/dl 128 178* 159* 144* 153* 154* 157* 173* 158* 145* 142* 155*                   * I Have Reviewed All Lab Data Listed Above  * Additional Pertinent Lab Tests Reviewed:  All Labs Within Last 24 Hours Reviewed    Imaging:    Imaging Reports Reviewed Today Include: None    Recent Cultures (last 7 days):           Last 24 Hours Medication List:   Current Facility-Administered Medications   Medication Dose Route Frequency Provider Last Rate   • acetaminophen  975 mg Oral Q6H [address] Flash Fabian MD     • ALPRAZolam  0 25 mg Oral Daily PRN Toy NAT BrodyC     • aluminum-magnesium hydroxide-simethicone  30 mL Oral Q4H PRN Reagan Marquis DO     • amLODIPine  10 mg Oral Daily Toy Ronn PARonC     • argatroban  0 1-3 mcg/kg/min Intravenous Titrated Yelena Maddox MD 0 02 mcg/kg/min (12/10/22 1536)   • atorvastatin  40 mg Oral Daily With Dinner Ronny Ham PA-C     • buPROPion  150 mg Oral Daily Corinna Do MD     • cefazolin  2,000 mg Intravenous Q8H Jackelyn Mueller MD 2,000 mg (12/11/22 1233)   • clopidogrel  75 mg Oral Daily Ronny Ham PA-C     • collagenase   Topical Daily Manuel Gibson MD     • doxazosin  4 mg Oral HS Shannan Cosme DO     • gabapentin  400 mg Oral TID Chanel Rick MD     • insulin glargine  8 Units Subcutaneous HS Shannan Cosme DO     • insulin lispro  1-6 Units Subcutaneous TID [facility] Ronny Ham PA-C     • labetalol  10 mg Intravenous Q6H PRN Shannan Cosme DO     • lisinopril  40 mg Oral Daily Shannan Cosme DO     • methocarbamol  1,000 mg Oral [facility] Chanel Rick MD     • metoclopramide  10 mg Intravenous Q6H PRN Ronny Ham PA-C     • metoprolol succinate  25 mg Oral Daily Shannan Cosme DO     • naloxone  0 04 mg Intravenous Q1MIN PRN Ronny Ham PA-C     • nystatin   Topical BID Ronny Ham PA-C     • ondansetron  4 mg Intravenous Q6H PRN Ronny Ham PA-C     • oxyCODONE  10 mg Oral Q12H [facility] Chanel Rcik MD     • oxyCODONE  2 5 mg Oral Q4H PRN Chanel Rick MD      Or   • oxyCODONE  5 mg Oral Q4H PRN Chanel Rick MD      Or   • oxyCODONE  7 5 mg Oral Q4H PRN Chanel Rick MD     • pantoprazole  40 mg Oral Early Morning Ronny Ham PA-C     • polyethylene glycol  17 g Oral BID Ronny Ham PA-C     • QUEtiapine  25 mg Oral HS Ronny Ham PA-C     • senna  1 tablet Oral BID Ronny Ham PA-C     • sertraline  175 mg Oral Daily Corinna Do MD     • warfarin  5 mg Oral Daily (warfarin) Shannan Cosme DO          Today, Patient Was Seen By: Katarina Richards MD    ** Please Note: Dictation voice to text software may have been used in the creation of this document   **

## 2022-12-11 NOTE — PROGRESS NOTES
[address] [facility]  Progress Note - [address] [facility] [address] 1960, 65 y o  female MRN: [id number]  Unit/Bed#: E4 -01 Encounter: [id number]  Primary Care Provider: ALEX Gupta   Date and time admitted to hospital: 10/21/2022  7:58 PM    * PAD (peripheral artery disease) [facility])  Assessment & Plan  65year old female former smoker w/HTN, HLD, uncontrolled type II DM (A1c 9 8), hx CVA, presenting with nonhealing extensive L heel ulceration and R hallux and 5th digit ulceration  Vascular surgery consulted for input  S/p multiple b/l endovascular interventions  JUSTIN  R hemispheric CVA    S/p L AKA 11/23/22 [facility])  S/p R AKA, wound debridement 12/1/22 [facility])      Recommendations:  - Bilateral tissue loss in the setting of uncontrolled type II DM and NYDIA on admission, now s/p multiple angiograms w/intervention    - Endovascular interventions, c/b atheroembolic event to the RLE and JUSTIN with R hemispheric CVA  - Now s/p L AKA on 11/23/22 and R AKA 12/1/22 w/ proximal thigh wound debridements  -Left lateral AKA incision with signs of infection  -We will plan to remove a few staples and monitor progress of left stump  - Plastic surgery input appreciated  Recommending daily santyl to R thigh wounds and possible future wound VAC to areas  - Continue argatroban drip per protocol will plan to bridge to Coumadin  Heme Onc note noted  -f/u INR  Continue argatroban gtt  -Goal INR is for while bridging to Coumadin with argatroban and once achieved overlap for 5 days, discontinue argatroban drip and recheck INR in 4-6 hours to ensure therapeutic levels  - Medical management with statin, Plavix  - Medical management and glucose control per SLIM    - Continue oral pain regimen, adjusted to long acting which appears to be controlling patient's pain well   - Appreciate psych input and management - addition of wellbutrin and adjustment of zoloft   - Wound care orders placed Subjective/Objective     Subjective: Patient felt well yesterday  Pain is relatively controlled  Objective:   Vitals: Blood pressure 156/69, pulse 82, temperature 98 °F (36 7 °C), temperature source Temporal, resp  rate 18, height 5' 4" (1 626 m), weight 76 9 kg (169 lb 8 5 oz), SpO2 97 %, not currently breastfeeding  ,Body mass index is 29 1 kg/m²  I/O       12/09 0701  12/10 0700 12/10 0701  12/11 0700 12/11 0701  12/12 0700    I V  (mL/kg)       Total Intake(mL/kg)       Urine (mL/kg/hr) 700 (0 4)      Total Output 700      Net -700                   Physical Exam:  Gen: NAD, Aox3, Comfortable in bed  Chest: Normal work of breathing, no respiratory distress  Abd: Soft, ND, NT  Ext:       Left AKA stump with lateral surgical site incision erythema  Area is draining seropurulent fluid  Right stump suture line clean, dry, intact  Areas of lateral and posterior skin breakdown and ulceration with yellow-greenish tent  Bilateral stumps tender with extremely limited range of motion  Skin: Warm, Dry, Intact      Lab, Imaging and other studies:   I have personally reviewed pertinent reports    , CBC with diff:   Lab Results   Component Value Date    WBC 12 63 (H) 12/11/2022    HGB 7 4 (L) 12/11/2022    HCT 24 9 (L) 12/11/2022    MCV 87 12/11/2022     (H) 12/11/2022    MCH 25 7 (L) 12/11/2022    MCHC 29 7 (L) 12/11/2022    RDW 16 8 (H) 12/11/2022    MPV 8 0 (L) 12/11/2022   , BMP/CMP:   Lab Results   Component Value Date    SODIUM 141 12/11/2022    K 3 6 12/11/2022     12/11/2022    CO2 28 12/11/2022    BUN 12 12/11/2022    CREATININE 0 80 12/11/2022    CALCIUM 8 4 12/11/2022    EGFR 79 12/11/2022     VTE Pharmacologic Prophylaxis: Argatroban, Coumadin  VTE Mechanical Prophylaxis: sequential compression device        Chacha Elkins MD  12/11/2022  7:02 AM

## 2022-12-11 NOTE — ASSESSMENT & PLAN NOTE
· Noted to have change in mental status on 11/13/2022  · CT confirmed moderate right parietal lobe stroke, likely embolic in the setting of HIT with thrombosis  · Heme Onc recommended Coumadin    Currently on argatroban bridge to Coumadin

## 2022-12-12 LAB
APTT PPP: 95 SECONDS (ref 23–37)
APTT PPP: 98 SECONDS (ref 23–37)
ERYTHROCYTE [DISTWIDTH] IN BLOOD BY AUTOMATED COUNT: 17.2 % (ref 11.6–15.1)
GLUCOSE SERPL-MCNC: 126 MG/DL (ref 65–140)
GLUCOSE SERPL-MCNC: 136 MG/DL (ref 65–140)
GLUCOSE SERPL-MCNC: 138 MG/DL (ref 65–140)
GLUCOSE SERPL-MCNC: 156 MG/DL (ref 65–140)
HCT VFR BLD AUTO: 24.6 % (ref 34.8–46.1)
HGB BLD-MCNC: 7.4 G/DL (ref 11.5–15.4)
INR PPP: 5.34 (ref 0.84–1.19)
INR PPP: 5.42 (ref 0.84–1.19)
INR PPP: 5.73 (ref 0.84–1.19)
MCH RBC QN AUTO: 25.6 PG (ref 26.8–34.3)
MCHC RBC AUTO-ENTMCNC: 30.1 G/DL (ref 31.4–37.4)
MCV RBC AUTO: 85 FL (ref 82–98)
PLATELET # BLD AUTO: 405 THOUSANDS/UL (ref 149–390)
PMV BLD AUTO: 8 FL (ref 8.9–12.7)
PROTHROMBIN TIME: 48.4 SECONDS (ref 11.6–14.5)
PROTHROMBIN TIME: 49 SECONDS (ref 11.6–14.5)
PROTHROMBIN TIME: 51.1 SECONDS (ref 11.6–14.5)
RBC # BLD AUTO: 2.89 MILLION/UL (ref 3.81–5.12)
WBC # BLD AUTO: 13.41 THOUSAND/UL (ref 4.31–10.16)

## 2022-12-12 PROCEDURE — 85730 THROMBOPLASTIN TIME PARTIAL: CPT | Performed by: SURGERY

## 2022-12-12 PROCEDURE — 99232 SBSQ HOSP IP/OBS MODERATE 35: CPT | Performed by: INTERNAL MEDICINE

## 2022-12-12 PROCEDURE — 82948 REAGENT STRIP/BLOOD GLUCOSE: CPT

## 2022-12-12 PROCEDURE — 85610 PROTHROMBIN TIME: CPT | Performed by: INTERNAL MEDICINE

## 2022-12-12 PROCEDURE — 85610 PROTHROMBIN TIME: CPT | Performed by: PHYSICIAN ASSISTANT

## 2022-12-12 PROCEDURE — 85027 COMPLETE CBC AUTOMATED: CPT | Performed by: SURGERY

## 2022-12-12 PROCEDURE — 85730 THROMBOPLASTIN TIME PARTIAL: CPT | Performed by: INTERNAL MEDICINE

## 2022-12-12 PROCEDURE — 99024 POSTOP FOLLOW-UP VISIT: CPT | Performed by: SURGERY

## 2022-12-12 RX ADMIN — GABAPENTIN 400 MG: 400 CAPSULE ORAL at 17:08

## 2022-12-12 RX ADMIN — GABAPENTIN 400 MG: 400 CAPSULE ORAL at 08:59

## 2022-12-12 RX ADMIN — CEFAZOLIN SODIUM 2000 MG: 2 SOLUTION INTRAVENOUS at 11:48

## 2022-12-12 RX ADMIN — OXYCODONE 7.5 MG: 5 TABLET ORAL at 11:59

## 2022-12-12 RX ADMIN — OXYCODONE 7.5 MG: 5 TABLET ORAL at 05:41

## 2022-12-12 RX ADMIN — SERTRALINE HYDROCHLORIDE 175 MG: 100 TABLET ORAL at 08:59

## 2022-12-12 RX ADMIN — NYSTATIN: 100000 POWDER TOPICAL at 09:03

## 2022-12-12 RX ADMIN — METHOCARBAMOL 1000 MG: 500 TABLET ORAL at 05:27

## 2022-12-12 RX ADMIN — QUETIAPINE FUMARATE 25 MG: 25 TABLET ORAL at 21:55

## 2022-12-12 RX ADMIN — METHOCARBAMOL 1000 MG: 500 TABLET ORAL at 21:56

## 2022-12-12 RX ADMIN — METHOCARBAMOL 1000 MG: 500 TABLET ORAL at 14:04

## 2022-12-12 RX ADMIN — INSULIN GLARGINE 8 UNITS: 100 INJECTION, SOLUTION SUBCUTANEOUS at 21:56

## 2022-12-12 RX ADMIN — LISINOPRIL 40 MG: 20 TABLET ORAL at 09:00

## 2022-12-12 RX ADMIN — ATORVASTATIN CALCIUM 40 MG: 40 TABLET, FILM COATED ORAL at 17:09

## 2022-12-12 RX ADMIN — NYSTATIN: 100000 POWDER TOPICAL at 17:14

## 2022-12-12 RX ADMIN — CEFAZOLIN SODIUM 2000 MG: 2 SOLUTION INTRAVENOUS at 17:10

## 2022-12-12 RX ADMIN — INSULIN LISPRO 1 UNITS: 100 INJECTION, SOLUTION INTRAVENOUS; SUBCUTANEOUS at 12:23

## 2022-12-12 RX ADMIN — CEFAZOLIN SODIUM 2000 MG: 2 SOLUTION INTRAVENOUS at 02:09

## 2022-12-12 RX ADMIN — OXYCODONE HYDROCHLORIDE 10 MG: 10 TABLET, FILM COATED, EXTENDED RELEASE ORAL at 21:55

## 2022-12-12 RX ADMIN — AMLODIPINE BESYLATE 10 MG: 10 TABLET ORAL at 09:00

## 2022-12-12 RX ADMIN — PANTOPRAZOLE SODIUM 40 MG: 40 TABLET, DELAYED RELEASE ORAL at 05:27

## 2022-12-12 RX ADMIN — GABAPENTIN 400 MG: 400 CAPSULE ORAL at 21:55

## 2022-12-12 RX ADMIN — OXYCODONE HYDROCHLORIDE 10 MG: 10 TABLET, FILM COATED, EXTENDED RELEASE ORAL at 08:59

## 2022-12-12 RX ADMIN — CLOPIDOGREL BISULFATE 75 MG: 75 TABLET ORAL at 08:59

## 2022-12-12 RX ADMIN — METOPROLOL SUCCINATE 25 MG: 25 TABLET, EXTENDED RELEASE ORAL at 09:00

## 2022-12-12 RX ADMIN — BUPROPION HYDROCHLORIDE 150 MG: 150 TABLET, FILM COATED, EXTENDED RELEASE ORAL at 09:00

## 2022-12-12 RX ADMIN — DOXAZOSIN 4 MG: 4 TABLET ORAL at 21:55

## 2022-12-12 NOTE — ASSESSMENT & PLAN NOTE
· Noted to have change in mental status on 11/13/2022    · CT confirmed moderate right parietal lobe stroke, likely embolic in the setting of HIT with thrombosis  · She was transitioned from argatroban to Coumadin

## 2022-12-12 NOTE — ASSESSMENT & PLAN NOTE
Lab Results   Component Value Date    HGBA1C 9 8 (H) 10/25/2022     Recent Labs     12/11/22  1613 12/11/22  2107 12/12/22  0734 12/12/22  1103   POCGLU 142* 149* 126 156*     Significantly reduced regimen from home due to poor oral intake  Continue Lantus to 8 units nightly plus sliding scale  Adjust  insulin regimen as needed

## 2022-12-12 NOTE — ASSESSMENT & PLAN NOTE
· Positive heparin antibody on 11/11/2022  · Completed 5 days of overlap with argatroban and Coumadin   · Argatroban discontinued today today      · Repeat INR pending  · Continue with 5 mg Coumadin nightly

## 2022-12-12 NOTE — PROGRESS NOTES
[address] [facility]  Progress Note - [facility] 1960, 65 y o  female MRN: [id number]  Unit/Bed#: E4 -01 Encounter: [id number]  Primary Care Provider: ALEX Arreguin   Date and time admitted to hospital: 10/21/2022  7:58 PM    * PAD (peripheral artery disease) [facility])  Assessment & Plan  65year old female former smoker w/HTN, HLD, uncontrolled type II DM (A1c 9 8), hx CVA, presenting with nonhealing extensive L heel ulceration and R hallux and 5th digit ulceration  Vascular surgery consulted for input  S/p multiple b/l endovascular interventions  JUSTIN  R hemispheric CVA    S/p L AKA 11/23/22 [facility])  S/p R AKA, wound debridement 12/1/22 [facility]      Recommendations:  - Bilateral tissue loss in the setting of uncontrolled type II DM and NYDIA on admission, now s/p multiple angiograms w/intervention    - Endovascular interventions, c/b atheroembolic event to the RLE and JUSTIN with R hemispheric CVA  - Now s/p L AKA on 11/23/22 and R AKA 12/1/22 w/ proximal thigh wound debridements  - Left lateral AKA incision with some erythema/drainage  Started on Ancef  - Plastic surgery input appreciated  Recommending daily santyl to R thigh wounds and possible future wound VAC to areas  - Continue argatroban drip per protocol will plan to bridge to Coumadin  Heme Onc note noted  -Argatroban gtt discontinued this AM (has had INR >4 for 5 days now)  Will recheck INR at 1230  If therapeutic (2-3) can keep argatroban gtt discontinued   - Medical management with statin, Plavix  - Medical management and glucose control per SLIM  - Continue oral pain regimen, adjusted to long acting which appears to be controlling patient's pain well   - Appreciate psych input and management - addition of wellbutrin and adjustment of zoloft   - Wound care orders placed         Subjective: Patient seen and examined  Reports that pain has been much better controlled       Vitals:  /73 (BP Location: Left arm)   Pulse 99   Temp 98 6 °F (37 °C) (Temporal)   Resp 18   Ht 5' 4" (1 626 m)   Wt 74 3 kg (163 lb 12 8 oz)   SpO2 97%   BMI 28 12 kg/m²     I/Os:  I/O last 3 completed shifts:  In: -   Out: 1001 [Urine:1000; Stool:1]  I/O this shift:  In: -   Out: 1100 [Urine:1100]     Lab Results and Cultures:   CBC with diff: Lab Results   Component Value Date    WBC 13 41 (H) 12/12/2022    HGB 7 4 (L) 12/12/2022    HCT 24 6 (L) 12/12/2022    MCV 85 12/12/2022     (H) 12/12/2022    MCH 25 6 (L) 12/12/2022    MCHC 30 1 (L) 12/12/2022    RDW 17 2 (H) 12/12/2022    MPV 8 0 (L) 12/12/2022    NRBC 0 12/05/2022   ,   BMP/CMP:  Lab Results   Component Value Date    SODIUM 141 12/11/2022    K 3 6 12/11/2022     12/11/2022    CO2 28 12/11/2022    BUN 12 12/11/2022    CREATININE 0 80 12/11/2022    CALCIUM 8 4 12/11/2022    AST 25 12/02/2022    ALT 12 12/02/2022    ALKPHOS 318 (H) 12/02/2022    EGFR 79 12/11/2022   ,   Lipid Panel: No results found for: CHOL,   Coags:   Lab Results   Component Value Date    PTT 95 (H) 12/12/2022    INR 5 34 (HH) 12/12/2022   ,     Blood Culture:   Lab Results   Component Value Date    BLOODCX No Growth After 5 Days   11/13/2022    BLOODCX Staphylococcus coagulase negative (A) 11/13/2022   ,   Urinalysis: Lab Results   Component Value Date    COLORU Yellow 11/12/2022    CLARITYU Slightly Cloudy 11/12/2022    SPECGRAV 1 025 11/12/2022    PHUR 5 5 11/12/2022    PHUR 6 5 02/23/2018    LEUKOCYTESUR Trace (A) 11/12/2022    NITRITE Positive (A) 11/12/2022    GLUCOSEU Negative 11/12/2022    KETONESU Negative 11/12/2022    BILIRUBINUR Negative 11/12/2022    BLOODU Large (A) 11/12/2022   ,   Urine Culture:   Lab Results   Component Value Date    URINECX >100,000 cfu/ml Enterobacter cloacae complex (A) 11/12/2022    URINECX 70,000-79,000 cfu/ml Kluyveromyces marxianus (A) 11/12/2022   ,   Wound Culure: No results found for: WOUNDCULT    Medications:  Current Facility-Administered Medications Medication Dose Route Frequency   • acetaminophen (TYLENOL) tablet 650 mg  650 mg Oral Q6H PRN   • ALPRAZolam (XANAX) tablet 0 25 mg  0 25 mg Oral Daily PRN   • aluminum-magnesium hydroxide-simethicone (MYLANTA) oral suspension 30 mL  30 mL Oral Q4H PRN   • amLODIPine (NORVASC) tablet 10 mg  10 mg Oral Daily   • atorvastatin (LIPITOR) tablet 40 mg  40 mg Oral Daily With Dinner   • buPROPion (WELLBUTRIN XL) 24 hr tablet 150 mg  150 mg Oral Daily   • ceFAZolin (ANCEF) IVPB (premix in dextrose) 2,000 mg 50 mL  2,000 mg Intravenous Q8H   • clopidogrel (PLAVIX) tablet 75 mg  75 mg Oral Daily   • collagenase (SANTYL) ointment   Topical Daily   • doxazosin (CARDURA) tablet 4 mg  4 mg Oral HS   • gabapentin (NEURONTIN) capsule 400 mg  400 mg Oral TID   • insulin glargine (LANTUS) subcutaneous injection 8 Units 0 08 mL  8 Units Subcutaneous HS   • insulin lispro (HumaLOG) 100 units/mL subcutaneous injection 1-6 Units  1-6 Units Subcutaneous TID AC   • labetalol (NORMODYNE) injection 10 mg  10 mg Intravenous Q6H PRN   • lisinopril (ZESTRIL) tablet 40 mg  40 mg Oral Daily   • methocarbamol (ROBAXIN) tablet 1,000 mg  1,000 mg Oral Q8H [address]   • metoclopramide (REGLAN) injection 10 mg  10 mg Intravenous Q6H PRN   • metoprolol succinate (TOPROL-XL) 24 hr tablet 25 mg  25 mg Oral Daily   • naloxone (NARCAN) 0 04 mg/mL syringe 0 04 mg  0 04 mg Intravenous Q1MIN PRN   • nystatin (MYCOSTATIN) powder   Topical BID   • ondansetron (ZOFRAN) injection 4 mg  4 mg Intravenous Q6H PRN   • oxyCODONE (OxyCONTIN) 12 hr tablet 10 mg  10 mg Oral Q12H MICHELLE   • oxyCODONE (ROXICODONE) IR tablet 2 5 mg  2 5 mg Oral Q4H PRN    Or   • oxyCODONE (ROXICODONE) IR tablet 5 mg  5 mg Oral Q4H PRN    Or   • oxyCODONE (ROXICODONE) IR tablet 7 5 mg  7 5 mg Oral Q4H PRN   • pantoprazole (PROTONIX) EC tablet 40 mg  40 mg Oral Early Morning   • polyethylene glycol (MIRALAX) packet 17 g  17 g Oral BID   • QUEtiapine (SEROquel) tablet 25 mg  25 mg Oral HS   • senna (SENOKOT) tablet 8 6 mg  1 tablet Oral BID   • sertraline (ZOLOFT) tablet 175 mg  175 mg Oral Daily       Imaging:  Reviewed    Physical Exam:    General: Alert and oriented  In no acute distress  CV: Regular rate  Respiratory: Normal effort   Abdominal: Soft, nontender  Extremities: Bilateral AKA with dressings in place  No strike through   Some drainage noted from L AKA stump over the weekend, currently clean and dry with dressing in place   Neurologic: Grossly normal       Cesar Baltazar PA-C  12/12/2022

## 2022-12-12 NOTE — PROGRESS NOTES
[address] [facility]  Progress Note - Ashwin Poole 1960, 65 y o  female MRN: [id number]  Unit/Bed#: E4 -01 Encounter: [id number]  Primary Care Provider: ALEX Rowan   Date and time admitted to hospital: 10/21/2022  7:58 PM    * PAD (peripheral artery disease) [facility])  Assessment & Plan  · Initially admitted for nonhealing wound of the left lower extremity, requiring multiple procedures ultimately required left AKA on 11/23/2022 and right AKA on 12/1/22  · Postoperative wound and pain management per primary service, plastic surgery  · Pain is  better controlled since her surgery  · Eventual short-term rehab placement    Stroke [facility])  Assessment & Plan  · Noted to have change in mental status on 11/13/2022  · CT confirmed moderate right parietal lobe stroke, likely embolic in the setting of HIT with thrombosis  · She was transitioned from argatroban to Coumadin    HIT (heparin-induced thrombocytopenia)  Assessment & Plan  · Positive heparin antibody on 11/11/2022  · Completed 5 days of overlap with argatroban and Coumadin   · Argatroban discontinued today today      · Repeat INR pending  · Continue with 5 mg Coumadin nightly    Type 2 diabetes mellitus with hyperglycemia, with long-term current use of insulin [facility])  Assessment & Plan  Lab Results   Component Value Date    HGBA1C 9 8 (H) 10/25/2022     Recent Labs     12/11/22  1613 12/11/22  2107 12/12/22  0734 12/12/22  1103   POCGLU 142* 149* 126 156*     Significantly reduced regimen from home due to poor oral intake  Continue Lantus to 8 units nightly plus sliding scale  Adjust  insulin regimen as needed     Benign essential hypertension  Assessment & Plan  Blood pressure elevated above goal  Blood pressure well controlled in the afternoon and evening and elevated in the morning  Continue Norvasc 10 mg, lisinopril 40 mg daily, metoprolol succinate 25 mg daily  Increase Cardura to 4 mg and dose QHS  Labetalol as needed      Depression, recurrent ([facility] [address] )  Assessment & Plan  Evaluated by Psychiatry x 2 on this admission  ·  continue Zoloft 175 mg daily, Seroquel 25 mg at bedtime  VTE Pharmacologic Prophylaxis:   Pharmacologic: Warfarin (Coumadin)  Mechanical VTE Prophylaxis in Place: No    Patient Centered Rounds: I have performed bedside rounds with nursing staff today  Discussions with Specialists or Other Care Team Provider: Discussed with vascular    Time Spent for Care: 20 minutes  More than 50% of total time spent on counseling and coordination of care as described above  Current Length of Stay: 51 day(s)    Current Patient Status: Inpatient   Certification Statement: The patient will continue to require additional inpatient hospital stay due to PAD    Discharge Plan: Rehab placement    Code Status: Level 1 - Full Code      Subjective:   Seen and examined during rounds  Pain remains controlled  She was appreciative of the care  She admits to being worried about going to rehab because she is used to the good care she has received here    Objective:     Vitals:   Temp (24hrs), Av °F (36 7 °C), Min:97 °F (36 1 °C), Max:98 6 °F (37 °C)    Temp:  [97 °F (36 1 °C)-98 6 °F (37 °C)] 97 °F (36 1 °C)  HR:  [] 93  Resp:  [18] 18  BP: (133-167)/(70-86) 164/70  SpO2:  [94 %-98 %] 98 %  Body mass index is 28 12 kg/m²  Input and Output Summary (last 24 hours): Intake/Output Summary (Last 24 hours) at 2022 1535  Last data filed at 2022 0701  Gross per 24 hour   Intake --   Output 1101 ml   Net -1101 ml       Physical Exam:     Physical Exam  Constitutional:       Appearance: She is not ill-appearing or diaphoretic  HENT:      Head: Normocephalic and atraumatic  Nose: No congestion or rhinorrhea  Eyes:      General: No scleral icterus  Cardiovascular:      Rate and Rhythm: Normal rate and regular rhythm     Pulmonary:      Effort: Pulmonary effort is normal    Abdominal:      General: Abdomen is flat  There is no distension  Tenderness: There is no abdominal tenderness  Musculoskeletal:         General: Deformity present  Cervical back: Neck supple  Comments: Bilateral AKA   Neurological:      Mental Status: She is alert and oriented to person, place, and time  Psychiatric:         Mood and Affect: Mood normal          Behavior: Behavior normal          Additional Data:     Labs:    Results from last 7 days   Lab Units 12/12/22  0536   WBC Thousand/uL 13 41*   HEMOGLOBIN g/dL 7 4*   HEMATOCRIT % 24 6*   PLATELETS Thousands/uL 405*     Results from last 7 days   Lab Units 12/11/22  0533   SODIUM mmol/L 141   POTASSIUM mmol/L 3 6   CHLORIDE mmol/L 105   CO2 mmol/L 28   BUN mg/dL 12   CREATININE mg/dL 0 80   ANION GAP mmol/L 8   CALCIUM mg/dL 8 4   GLUCOSE RANDOM mg/dL 150*     Results from last 7 days   Lab Units 12/12/22  1223   INR  5 73*     Results from last 7 days   Lab Units 12/12/22  1103 12/12/22  0734 12/11/22  2107 12/11/22  1613 12/11/22  1055 12/11/22  0735 12/10/22  2057 12/10/22  1604 12/10/22  1148 12/10/22  0830 12/09/22  2134 12/09/22  1743   POC GLUCOSE mg/dl 156* 126 149* 142* 128 178* 159* 144* 153* 154* 157* 173*                   * I Have Reviewed All Lab Data Listed Above    * Additional Pertinent Lab Tests Reviewed: No New Labs Available For Today    Imaging:    Imaging Reports Reviewed Today Include: None    Recent Cultures (last 7 days):           Last 24 Hours Medication List:   Current Facility-Administered Medications   Medication Dose Route Frequency Provider Last Rate   • acetaminophen  650 mg Oral Q6H PRN Chioma Carlin MD     • ALPRAZolam  0 25 mg Oral Daily PRN Cesar Baltazar PA-C     • aluminum-magnesium hydroxide-simethicone  30 mL Oral Q4H PRN Sharyle Denise, DO     • amLODIPine  10 mg Oral Daily Cesar Baltazar PA-C     • atorvastatin  40 mg Oral Daily With [address], [place]onC     • buPROPion  150 mg Oral Daily Prabhakar Noel MD     • cefazolin  2,000 mg Intravenous Q8H Delfin Cuello MD 2,000 mg (12/12/22 1148)   • clopidogrel  75 mg Oral Daily Merl Knee, PA-C     • collagenase   Topical Daily Janie Peters MD     • doxazosin  4 mg Oral HS Iline Mitten, DO     • gabapentin  400 mg Oral TID Jac Maria MD     • insulin glargine  8 Units Subcutaneous HS Iline Mitten, DO     • insulin lispro  1-6 Units Subcutaneous TID [facility] Merl Knee, PA-C     • labetalol  10 mg Intravenous Q6H PRN Iline Mitten, DO     • lisinopril  40 mg Oral Daily [facility] Mitten, DO     • methocarbamol  1,000 mg Oral [facility] Jac Maria MD     • metoclopramide  10 mg Intravenous Q6H PRN Merl Knee, PA-C     • metoprolol succinate  25 mg Oral Daily Iline Mitten, DO     • naloxone  0 04 mg Intravenous Q1MIN PRN Merl Knee, PA-C     • nystatin   Topical BID Merl Knee, PA-C     • ondansetron  4 mg Intravenous Q6H PRN Merl Knee, PA-C     • oxyCODONE  10 mg Oral Q12H [address] Jac Maria MD     • oxyCODONE  2 5 mg Oral Q4H PRN Jac Maria MD      Or   • oxyCODONE  5 mg Oral Q4H PRN Jac Maria MD      Or   • oxyCODONE  7 5 mg Oral Q4H PRN Jac Maria MD     • pantoprazole  40 mg Oral Early Morning Merl Knee, PA-C     • polyethylene glycol  17 g Oral BID Merl Knee, PA-C     • QUEtiapine  25 mg Oral HS Merl Knee, PA-C     • senna  1 tablet Oral BID Merl Knee, PA-C     • sertraline  175 mg Oral Daily Prabhakar Noel MD          Today, Patient Was Seen By: Adriane Hong MD    ** Please Note: Dictation voice to text software may have been used in the creation of this document   **

## 2022-12-12 NOTE — ASSESSMENT & PLAN NOTE
65year old female former smoker w/HTN, HLD, uncontrolled type II DM (A1c 9 8), hx CVA, presenting with nonhealing extensive L heel ulceration and R hallux and 5th digit ulceration  Vascular surgery consulted for input  S/p multiple b/l endovascular interventions  JUSTIN  R hemispheric CVA    S/p L AKA 11/23/22 [facility])  S/p R AKA, wound debridement 12/1/22 [facility])      Recommendations:  - Bilateral tissue loss in the setting of uncontrolled type II DM and NYDIA on admission, now s/p multiple angiograms w/intervention    - Endovascular interventions, c/b atheroembolic event to the RLE and JUSTIN with R hemispheric CVA  - Now s/p L AKA on 11/23/22 and R AKA 12/1/22 w/ proximal thigh wound debridements  - Left lateral AKA incision with some erythema/drainage  Started on Ancef  - Plastic surgery input appreciated  Recommending daily santyl to R thigh wounds and possible future wound VAC to areas  - Continue argatroban drip per protocol will plan to bridge to Coumadin  Heme Onc note noted  -Argatroban gtt discontinued this AM (has had INR >4 for 5 days now)  Will recheck INR at 1230  If therapeutic (2-3) can keep argatroban gtt discontinued   - Medical management with statin, Plavix  - Medical management and glucose control per SLIM    - Continue oral pain regimen, adjusted to long acting which appears to be controlling patient's pain well   - Appreciate psych input and management - addition of wellbutrin and adjustment of zoloft   - Wound care orders placed

## 2022-12-12 NOTE — ASSESSMENT & PLAN NOTE
· Initially admitted for nonhealing wound of the left lower extremity, requiring multiple procedures ultimately required left AKA on 11/23/2022 and right AKA on 12/1/22  · Postoperative wound and pain management per primary service, plastic surgery  · Pain is  better controlled since her surgery    · Eventual short-term rehab placement

## 2022-12-12 NOTE — QUICK NOTE
Notified of critical result: INR 5 34    · Maintained on argatroban infusion and warfarin 5mg  · Will d/c today's dose of warfarin and repeat a m INR tomorrow  · Per vascular sx note: Goal INR is for while bridging to Coumadin with argatroban and once achieved overlap for 5 days, discontinue argatroban drip and recheck INR in 4-6 hours to ensure therapeutic levels  · Patient received warfarin 12/7, 12/8, 12/9, 12/10 and 12/22  · Day team will follow

## 2022-12-13 ENCOUNTER — TELEPHONE (OUTPATIENT)
Dept: HEMATOLOGY ONCOLOGY | Facility: CLINIC | Age: 62
End: 2022-12-13

## 2022-12-13 ENCOUNTER — VBI (OUTPATIENT)
Dept: ADMINISTRATIVE | Facility: OTHER | Age: 62
End: 2022-12-13

## 2022-12-13 LAB
GLUCOSE SERPL-MCNC: 139 MG/DL (ref 65–140)
GLUCOSE SERPL-MCNC: 154 MG/DL (ref 65–140)
GLUCOSE SERPL-MCNC: 170 MG/DL (ref 65–140)
GLUCOSE SERPL-MCNC: 179 MG/DL (ref 65–140)
INR PPP: 4.08 (ref 0.84–1.19)
PROTHROMBIN TIME: 39.2 SECONDS (ref 11.6–14.5)

## 2022-12-13 PROCEDURE — 97110 THERAPEUTIC EXERCISES: CPT | Performed by: PHYSICAL THERAPIST

## 2022-12-13 PROCEDURE — 82948 REAGENT STRIP/BLOOD GLUCOSE: CPT

## 2022-12-13 PROCEDURE — 97112 NEUROMUSCULAR REEDUCATION: CPT | Performed by: PHYSICAL THERAPIST

## 2022-12-13 PROCEDURE — 99024 POSTOP FOLLOW-UP VISIT: CPT | Performed by: SURGERY

## 2022-12-13 PROCEDURE — 99232 SBSQ HOSP IP/OBS MODERATE 35: CPT | Performed by: INTERNAL MEDICINE

## 2022-12-13 PROCEDURE — 99024 POSTOP FOLLOW-UP VISIT: CPT | Performed by: NURSE PRACTITIONER

## 2022-12-13 PROCEDURE — 85610 PROTHROMBIN TIME: CPT | Performed by: INTERNAL MEDICINE

## 2022-12-13 RX ORDER — WARFARIN SODIUM 2.5 MG/1
2.5 TABLET ORAL
Status: COMPLETED | OUTPATIENT
Start: 2022-12-13 | End: 2022-12-13

## 2022-12-13 RX ADMIN — GABAPENTIN 400 MG: 400 CAPSULE ORAL at 08:56

## 2022-12-13 RX ADMIN — INSULIN LISPRO 1 UNITS: 100 INJECTION, SOLUTION INTRAVENOUS; SUBCUTANEOUS at 16:39

## 2022-12-13 RX ADMIN — CEFAZOLIN SODIUM 2000 MG: 2 SOLUTION INTRAVENOUS at 17:42

## 2022-12-13 RX ADMIN — CEFAZOLIN SODIUM 2000 MG: 2 SOLUTION INTRAVENOUS at 09:42

## 2022-12-13 RX ADMIN — GABAPENTIN 400 MG: 400 CAPSULE ORAL at 16:38

## 2022-12-13 RX ADMIN — QUETIAPINE FUMARATE 25 MG: 25 TABLET ORAL at 21:20

## 2022-12-13 RX ADMIN — OXYCODONE 5 MG: 5 TABLET ORAL at 05:48

## 2022-12-13 RX ADMIN — OXYCODONE 5 MG: 5 TABLET ORAL at 13:30

## 2022-12-13 RX ADMIN — COLLAGENASE SANTYL: 250 OINTMENT TOPICAL at 08:57

## 2022-12-13 RX ADMIN — INSULIN GLARGINE 8 UNITS: 100 INJECTION, SOLUTION SUBCUTANEOUS at 21:19

## 2022-12-13 RX ADMIN — AMLODIPINE BESYLATE 10 MG: 10 TABLET ORAL at 08:57

## 2022-12-13 RX ADMIN — DOXAZOSIN 4 MG: 4 TABLET ORAL at 21:19

## 2022-12-13 RX ADMIN — OXYCODONE HYDROCHLORIDE 10 MG: 10 TABLET, FILM COATED, EXTENDED RELEASE ORAL at 08:56

## 2022-12-13 RX ADMIN — METHOCARBAMOL 1000 MG: 500 TABLET ORAL at 05:47

## 2022-12-13 RX ADMIN — CLOPIDOGREL BISULFATE 75 MG: 75 TABLET ORAL at 08:56

## 2022-12-13 RX ADMIN — PANTOPRAZOLE SODIUM 40 MG: 40 TABLET, DELAYED RELEASE ORAL at 05:47

## 2022-12-13 RX ADMIN — ATORVASTATIN CALCIUM 40 MG: 40 TABLET, FILM COATED ORAL at 16:38

## 2022-12-13 RX ADMIN — CEFAZOLIN SODIUM 2000 MG: 2 SOLUTION INTRAVENOUS at 02:07

## 2022-12-13 RX ADMIN — METHOCARBAMOL 1000 MG: 500 TABLET ORAL at 13:30

## 2022-12-13 RX ADMIN — METHOCARBAMOL 1000 MG: 500 TABLET ORAL at 21:19

## 2022-12-13 RX ADMIN — BUPROPION HYDROCHLORIDE 150 MG: 150 TABLET, FILM COATED, EXTENDED RELEASE ORAL at 08:57

## 2022-12-13 RX ADMIN — SERTRALINE HYDROCHLORIDE 175 MG: 100 TABLET ORAL at 08:56

## 2022-12-13 RX ADMIN — WARFARIN SODIUM 2.5 MG: 2.5 TABLET ORAL at 17:10

## 2022-12-13 RX ADMIN — NYSTATIN: 100000 POWDER TOPICAL at 08:57

## 2022-12-13 RX ADMIN — OXYCODONE 7.5 MG: 5 TABLET ORAL at 17:09

## 2022-12-13 RX ADMIN — GABAPENTIN 400 MG: 400 CAPSULE ORAL at 21:20

## 2022-12-13 RX ADMIN — OXYCODONE HYDROCHLORIDE 10 MG: 10 TABLET, FILM COATED, EXTENDED RELEASE ORAL at 21:19

## 2022-12-13 RX ADMIN — LISINOPRIL 40 MG: 20 TABLET ORAL at 08:56

## 2022-12-13 RX ADMIN — METOPROLOL SUCCINATE 25 MG: 25 TABLET, EXTENDED RELEASE ORAL at 08:56

## 2022-12-13 RX ADMIN — NYSTATIN: 100000 POWDER TOPICAL at 17:10

## 2022-12-13 NOTE — PLAN OF CARE
Problem: PHYSICAL THERAPY ADULT  Goal: Performs mobility at highest level of function for planned discharge setting  See evaluation for individualized goals  Description: Treatment/Interventions: Functional transfer training, LE strengthening/ROM, Therapeutic exercise, Endurance training, Patient/family training, Equipment eval/education, Bed mobility, Compensatory technique education, Gait training, Continued evaluation, Spoke to nursing, Spoke to MD, Spoke to case management, OT          See flowsheet documentation for full assessment, interventions and recommendations  Outcome: Progressing  Note: Prognosis: Fair  Problem List: Decreased strength, Decreased endurance, Impaired balance, Decreased mobility, Decreased safety awareness, Obesity, Decreased skin integrity, Orthopedic restrictions, Pain  Assessment: pt seen for PT treatment session  pt worked on rolling bed moiblity, pulling from supine to sitting  using bedrails and assist  performed ue ther ex for strengthening   pt able to tolerate short burst of ther ex due to muscle fatigue  tolerated 2 sets of 10 reps for shoulder and bi/triceps ex against mild resistance  pt needed max assist for supine to sitting  tolerated 30 seconds to 1 minute due to fatigue  pt also worked on rolling side to side, needing mod assist of 1 and step by step instructions  pt has l sided vision deficit and has trouble finding rail on that side  pt reported incisional pain and fatigue but was happy with progress  pt will need continued skilled PT and rehab  Barriers to Discharge: Decreased caregiver support  Barriers to Discharge Comments: alone  PT Discharge Recommendation: Post acute rehabilitation services    See flowsheet documentation for full assessment

## 2022-12-13 NOTE — TELEPHONE ENCOUNTER
New Patient Intake Form   Patient Details:    Alexi Cruz  1960    Appointment Information   Who is calling to schedule? Nichol Pinzon   If not self, what is the caller's name? NA   DID YOU CONFIRM INSURANCE WITH PATIENT? E verified, Routed to finance   Referring provider Dr Shelbi Chaudhry   What is the diagnosis? CVA, HIT     Is there a confirmed tissue diagnosis? NA     Is there a biopsy ordered or pending? Please specify dates  If yes, route to NN/OCC   NA     Is patient aware of diagnosis? Yes     Have you had any imaging or labs done? If yes, where? (If imaging done outside of [facility], please remind patient to bring a disk ) Yes-[facility]     If imaging done at outside facility, did you instruct patient to obtain discs and bring to visit? NA   Have you been seen by another Oncologist/Hematologist?  If so, who and where? No   Are the [facility] in [facility] Everywhere? Yes   Does the patient have records at another facility/hospital?    If yes, Name of facility, city and state where facility is located  NA     Did you instruct patient to have records faxed to [facility] and provide rightfax number? NA   Preferred [place]   Is the patient willing to be seen by another provider? (This is for breast patients only) NA     Did you send new patient paperwork? Email or mail? NA   Miscellaneous Information: The patient has been scheduled for a HFU appointment with Dr Patrick Gupta in the [facility] office on 2/23 at 1120

## 2022-12-13 NOTE — PROGRESS NOTES
[address] [facility]  Progress Note - Sukhi Sons 1960, 65 y o  female MRN: [id number]  Unit/Bed#: E4 -01 Encounter: [id number]  Primary Care Provider: ALEX Chaudhary   Date and time admitted to hospital: 10/21/2022  7:58 PM    * PAD (peripheral artery disease) [facility])  Assessment & Plan  65year old female former smoker w/HTN, HLD, uncontrolled type II DM (A1c 9 8), hx CVA, presenting with nonhealing extensive L heel ulceration and R hallux and 5th digit ulceration  Vascular surgery consulted for input  S/p multiple b/l endovascular interventions  JUSTIN  R hemispheric CVA    S/p L AKA 11/23/22 [facility])  S/p R AKA, wound debridement 12/1/22 [facility])      Recommendations:  - Bilateral tissue loss in the setting of uncontrolled type II DM and NYDIA on admission, now s/p multiple angiograms w/intervention    - Endovascular interventions, c/b atheroembolic event to the RLE and JUSTIN with R hemispheric CVA  - Now s/p L AKA on 11/23/22 and R AKA 12/1/22 w/ proximal thigh wound debridements  - Left lateral AKA incision with some erythema/drainage  Started on Ancef - clean this morning, continue 1 week course  - Plastic surgery input appreciated  Recommending daily santyl to R thigh wounds and possible future wound VAC to areas  - Continue argatroban drip per protocol will plan to bridge to Coumadin  Heme Onc note noted  -Argatroban gtt discontinued 12/12  Repeat INR yesterday 5 42, did not get Coumadin dose last night  Would titrate dose by decreasing dose to 2 5 today if remains supratherapeutic over foregoing nightly dose to avoid large swings in INR   - Medical management with statin, Plavix  - Medical management and glucose control per SLIM    - Continue oral pain regimen, adjusted to long acting which appears to be controlling patient's pain well   - Appreciate psych input and management - addition of wellbutrin and adjustment of zoloft   - Wound care orders placed Subjective: Patient seen and examined  She is feeling well today  No complaints  Vitals:  /62 (BP Location: Left arm)   Pulse 91   Temp 98 3 °F (36 8 °C) (Temporal)   Resp 18   Ht 5' 4" (1 626 m)   Wt 72 5 kg (159 lb 13 3 oz)   SpO2 93%   BMI 27 44 kg/m²     I/Os:  I/O last 3 completed shifts:  In: -   Out: 1701 [Urine:1700; Stool:1]  No intake/output data recorded  Lab Results and Cultures:   CBC with diff:   Lab Results   Component Value Date    WBC 13 41 (H) 12/12/2022    HGB 7 4 (L) 12/12/2022    HCT 24 6 (L) 12/12/2022    MCV 85 12/12/2022     (H) 12/12/2022    MCH 25 6 (L) 12/12/2022    MCHC 30 1 (L) 12/12/2022    RDW 17 2 (H) 12/12/2022    MPV 8 0 (L) 12/12/2022    NRBC 0 12/05/2022   ,   BMP/CMP:  Lab Results   Component Value Date    SODIUM 141 12/11/2022    K 3 6 12/11/2022     12/11/2022    CO2 28 12/11/2022    BUN 12 12/11/2022    CREATININE 0 80 12/11/2022    CALCIUM 8 4 12/11/2022    AST 25 12/02/2022    ALT 12 12/02/2022    ALKPHOS 318 (H) 12/02/2022    EGFR 79 12/11/2022   ,   Lipid Panel: No results found for: CHOL,   Coags:   Lab Results   Component Value Date    PTT 95 (H) 12/12/2022    INR 5 42 (HH) 12/12/2022   ,     Blood Culture:   Lab Results   Component Value Date    BLOODCX No Growth After 5 Days   11/13/2022    BLOODCX Staphylococcus coagulase negative (A) 11/13/2022   ,   Urinalysis:   Lab Results   Component Value Date    COLORU Yellow 11/12/2022    CLARITYU Slightly Cloudy 11/12/2022    SPECGRAV 1 025 11/12/2022    PHUR 5 5 11/12/2022    PHUR 6 5 02/23/2018    LEUKOCYTESUR Trace (A) 11/12/2022    NITRITE Positive (A) 11/12/2022    GLUCOSEU Negative 11/12/2022    KETONESU Negative 11/12/2022    BILIRUBINUR Negative 11/12/2022    BLOODU Large (A) 11/12/2022   ,   Urine Culture:   Lab Results   Component Value Date    URINECX >100,000 cfu/ml Enterobacter cloacae complex (A) 11/12/2022    URINECX 70,000-79,000 cfu/ml Kluyveromyces marxianus (A) 11/12/2022   ,   Wound Culure: No results found for: WOUNDCULT    Medications:  Current Facility-Administered Medications   Medication Dose Route Frequency   • acetaminophen (TYLENOL) tablet 650 mg  650 mg Oral Q6H PRN   • ALPRAZolam (XANAX) tablet 0 25 mg  0 25 mg Oral Daily PRN   • aluminum-magnesium hydroxide-simethicone (MYLANTA) oral suspension 30 mL  30 mL Oral Q4H PRN   • amLODIPine (NORVASC) tablet 10 mg  10 mg Oral Daily   • atorvastatin (LIPITOR) tablet 40 mg  40 mg Oral Daily With Dinner   • buPROPion (WELLBUTRIN XL) 24 hr tablet 150 mg  150 mg Oral Daily   • ceFAZolin (ANCEF) IVPB (premix in dextrose) 2,000 mg 50 mL  2,000 mg Intravenous Q8H   • clopidogrel (PLAVIX) tablet 75 mg  75 mg Oral Daily   • collagenase (SANTYL) ointment   Topical Daily   • doxazosin (CARDURA) tablet 4 mg  4 mg Oral HS   • gabapentin (NEURONTIN) capsule 400 mg  400 mg Oral TID   • insulin glargine (LANTUS) subcutaneous injection 8 Units 0 08 mL  8 Units Subcutaneous HS   • insulin lispro (HumaLOG) 100 units/mL subcutaneous injection 1-6 Units  1-6 Units Subcutaneous TID AC   • labetalol (NORMODYNE) injection 10 mg  10 mg Intravenous Q6H PRN   • lisinopril (ZESTRIL) tablet 40 mg  40 mg Oral Daily   • methocarbamol (ROBAXIN) tablet 1,000 mg  1,000 mg Oral Q8H [facility]   • metoclopramide (REGLAN) injection 10 mg  10 mg Intravenous Q6H PRN   • metoprolol succinate (TOPROL-XL) 24 hr tablet 25 mg  25 mg Oral Daily   • naloxone (NARCAN) 0 04 mg/mL syringe 0 04 mg  0 04 mg Intravenous Q1MIN PRN   • nystatin (MYCOSTATIN) powder   Topical BID   • ondansetron (ZOFRAN) injection 4 mg  4 mg Intravenous Q6H PRN   • oxyCODONE (OxyCONTIN) 12 hr tablet 10 mg  10 mg Oral Q12H MICHELLE   • oxyCODONE (ROXICODONE) IR tablet 2 5 mg  2 5 mg Oral Q4H PRN    Or   • oxyCODONE (ROXICODONE) IR tablet 5 mg  5 mg Oral Q4H PRN    Or   • oxyCODONE (ROXICODONE) IR tablet 7 5 mg  7 5 mg Oral Q4H PRN   • pantoprazole (PROTONIX) EC tablet 40 mg  40 mg Oral Early Morning   • polyethylene glycol (MIRALAX) packet 17 g  17 g Oral BID   • QUEtiapine (SEROquel) tablet 25 mg  25 mg Oral HS   • senna (SENOKOT) tablet 8 6 mg  1 tablet Oral BID   • sertraline (ZOLOFT) tablet 175 mg  175 mg Oral Daily       Imaging:  Reviewed    Physical Exam:    General: Alert and oriented  In no acute distress  CV: Regular rate  Respiratory: Normal effort   Abdominal: Soft, nontender  Extremities: Bilateral AKA with dressings in place  No strike through     Neurologic: Grossly normal       Arpit Landeros MD  12/13/2022 Quality 431: Preventive Care And Screening: Unhealthy Alcohol Use - Screening: Patient not identified as an unhealthy alcohol user when screened for unhealthy alcohol use using a systematic screening method Detail Level: Detailed Quality 110: Preventive Care And Screening: Influenza Immunization: Influenza Immunization previously received during influenza season Quality 226: Preventive Care And Screening: Tobacco Use: Screening And Cessation Intervention: Patient screened for tobacco use and is an ex/non-smoker Quality 130: Documentation Of Current Medications In The Medical Record: Current Medications Documented

## 2022-12-13 NOTE — ASSESSMENT & PLAN NOTE
· She was initially admitted for nonhealing wound of the left lower extremity, requiring multiple endovascular interventions and ultimately underwent left AKA on 11/23/2022 and right AKA on 12/1/22  · Postoperative wound and pain management per primary service, plastic surgery  · Pain is  better controlled since her surgery  · Per vascular surgery evaluation today, wound healing is suboptimal  and they plan to debride the wound and possibly place a wound VAC    · Eventual short-term rehab placement when medically stable

## 2022-12-13 NOTE — PHYSICAL THERAPY NOTE
Physical Therapy Progress Note     12/13/22 1327   PT Last Visit   PT Visit Date 12/13/22   Note Type   Note Type Treatment   Pain Assessment   Pain Assessment Tool 0-10   Pain Score 8   Pain Location/Orientation Orientation: Bilateral;Other (Comment)  (residual limbs)   Hospital Pain Intervention(s) Repositioned; Ambulation/increased activity; Emotional support   Restrictions/Precautions   Weight Bearing Precautions Per Order Yes   RLE Weight Bearing Per Order NWB   LLE Weight Bearing Per Order NWB   Other Precautions Bed Alarm; Fall Risk;Pain;WBS   General   Chart Reviewed Yes   Response to Previous Treatment Patient with no complaints from previous session  Family/Caregiver Present No   Cognition   Overall Cognitive Status WFL   Orientation Level Oriented X4   Subjective   Subjective reporting incisional pain ble  willing to work with PT   Bed Mobility   Rolling R 3  Moderate assistance   Additional items Assist x 1; Increased time required;Verbal cues;LE management; Bedrails  (worked on reaching across to rails, x3)   Rolling L 3  Moderate assistance   Additional items Assist x 1;Bedrails; Increased time required;Verbal cues;LE management  (focus on reaching across to rails, x3)   Transfers   Sit to Stand Unable to assess   Ambulation/Elevation   Gait pattern Not tested   Balance   Static Sitting Poor   Dynamic Sitting Poor -   Endurance Deficit   Endurance Deficit Yes   Endurance Deficit Description pain   Activity Tolerance   Activity Tolerance Treatment limited secondary to medical complications (Comment); Patient limited by pain   Nurse Made Aware yes   Exercises   UE Exercise   (bed pushups2 sets of 10 reps, pull to sitting using bedrails x4  rrom biceps / triceps 10 reps bue)   Neuro re-ed practice rolling side to side x3  pt also worked on pull to "long sit", max assist , sitting for 60 seconds, 30 seconds x2     Balance training  sitting balance in bed   Assessment   Prognosis Fair   Problem List Decreased strength;Decreased endurance; Impaired balance;Decreased mobility; Decreased safety awareness; Obesity; Decreased skin integrity;Orthopedic restrictions;Pain   Assessment pt seen for PT treatment session  pt worked on rolling bed moiblity, pulling from supine to sitting  using bedrails and assist  performed ue ther ex for strengthening   pt able to tolerate short burst of ther ex due to muscle fatigue  tolerated 2 sets of 10 reps for shoulder and bi/triceps ex against mild resistance  pt needed max assist for supine to sitting  tolerated 30 seconds to 1 minute due to fatigue  pt also worked on rolling side to side, needing mod assist of 1 and step by step instructions  pt has l sided vision deficit and has trouble finding rail on that side  pt reported incisional pain and fatigue but was happy with progress  pt will need continued skilled PT and rehab   Barriers to Discharge Decreased caregiver support   Barriers to Discharge Comments alone   Goals   Patient Goals improve strength and balance   STG Expiration Date 12/23/22   PT Treatment Day 11   Plan   Treatment/Interventions Functional transfer training; Therapeutic exercise; Endurance training;Patient/family training;Bed mobility; Compensatory technique education;Spoke to nursing;Family   Progress Slow progress, medical status limitations   PT Frequency 3-5x/wk   Recommendation   PT Discharge Recommendation Post acute rehabilitation services   Equipment Recommended   (andree)   AM-PAC Basic Mobility Inpatient   Turning in Bed Without Bedrails 2   Lying on Back to Sitting on Edge of Flat Bed 2   Moving Bed to Chair 1   Standing Up From Chair 1   Walk in Room 1   Climb 3-5 Stairs 1   Basic Mobility Inpatient Raw Score 8   Turning Head Towards Sound 4   Follow Simple Instructions 4   Low Function Basic Mobility Raw Score 16   Low Function Basic Mobility Standardized Score 25 72   Highest Level Of Mobility   -[facility] Goal 3: Sit at edge of bed   -HL Achieved 3: Sit at edge of bed   Education   Education Provided Mobility training;Home exercise program         An AM-PAC Basic Mobility standardized score less than 40 78 suggests the patient may benefit from discharge to post-acute rehab services      Anders Santos, PT

## 2022-12-13 NOTE — ASSESSMENT & PLAN NOTE
Continue amlodipine 10 mg daily, doxazosin 4 mg at bedtime, metoprolol 25 mg daily, lisinopril 40 mg daily with hold parameters Lynne Epsinal is 16 year old 6 month old, here for a preventive care visit. 44    Assessment & Plan     Lynne was seen today for well child.    Diagnoses and all orders for this visit:    Current moderate episode of major depressive disorder without prior episode (H)    Encounter for routine child health examination w/o abnormal findings  -     BEHAVIORAL/EMOTIONAL ASSESSMENT (21277)  -     SCREENING TEST, PURE TONE, AIR ONLY  -     SCREENING, VISUAL ACUITY, QUANTITATIVE, BILAT  -     Chlamydia trachomatis PCR; Future    LIDA (generalized anxiety disorder)    Other orders  -     COVID-19,PF,PFIZER (12+ Yrs GRAY LABEL)      Patient Instructions   Family wants to wait for neuropsych testing prior to changing medication.  Patient or Mom will call if she is doing worse.  Continue taking Lexapro 20 mg.   Follow up in 3 months.     LIDA-7 SCORE 3/26/2021 6/23/2021 11/23/2021   Total Score 14 11 13         PHQ-9 score:    PHQ 2/21/2022   PHQ-9 Total Score 13   Q9: Thoughts of better off dead/self-harm past 2 weeks Not at all   PHQ-A Total Score -   PHQ-A Depressed most days in past year -   PHQ-A Mood affect on daily activities -   PHQ-A Suicide Ideation past 2 weeks -   PHQ-A Suicide Ideation past month -   PHQ-A Previous suicide attempt -               Growth      Normal height and weight  Underweight      Immunizations     Appropriate vaccinations were ordered.  I provided face to face vaccine counseling, answered questions, and explained the benefits and risks of the vaccine components ordered today including:  Pfizer COVID 19      Anticipatory Guidance    Reviewed age appropriate anticipatory guidance.   The following topics were discussed:  SOCIAL/ FAMILY:    Increased responsibility    Parent/ teen communication    Limits/ consequences    TV/ media    School/ homework  NUTRITION:    Healthy food choices    Family meals    Calcium     Vitamins/ supplements    Weight management  HEALTH / SAFETY:    Adequate sleep/  exercise    Sleep issues    Dental care  SEXUALITY:    Body changes with puberty    Menstruation    Dating/ relationships      Referrals/Ongoing Specialty Care  Verbal referral for routine dental care    Follow Up      Return in 1 year (on 2/21/2023) for Preventive Care visit.    Subjective      Anxiety and Depression: Patient continues on Lexapro 20 mg. Today she is doing poorly. They are doing neuropsych testing in the future. Her friends, parents, and siblings are all doing well.     Review of Systems:  Constitutional, eye, ENT, skin, respiratory, cardiac, and GI are normal except as otherwise noted.    PSFH:  No recent change to medical, surgical, family, or social history.    Patient has been advised of split billing requirements and indicates understanding: Yes    Social 2/20/2022   Who does your adolescent live with? Parent(s), Sibling(s)   Has your adolescent experienced any stressful family events recently? None   In the past 12 months, has lack of transportation kept you from medical appointments or from getting medications? No   In the last 12 months, was there a time when you were not able to pay the mortgage or rent on time? No   In the last 12 months, was there a time when you did not have a steady place to sleep or slept in a shelter (including now)? No     Health Risks/Safety 2/20/2022   Does your adolescent always wear a seat belt? Yes   Does your adolescent wear a helmet for bicycle, rollerblades, skateboard, scooter, skiing/snowboarding, ATV/snowmobile? Yes   Do you have guns/firearms in the home? -   Are the guns/firearms secured in a safe or with a trigger lock? -   Is ammunition stored separately from guns? -     TB Screening 12/15/2021   Was your adolescent born outside of the [place]? No     TB Screening 2/20/2022   Since your last Well Child visit, has your adolescent or any of their family members or close contacts had tuberculosis or a positive tuberculosis test? No   Since your last  Well Child Visit, has your adolescent or any of their family members or close contacts traveled or lived outside of the [place]? No   Since your last Well Child visit, has your adolescent lived in a high-risk group setting like a correctional facility, health care facility, homeless shelter, or refugee camp?  No           Dyslipidemia Screening 2/20/2022   Have any of the child's parents or grandparents had a stroke or heart attack before age 55 for males or before age 65 for females?  No   Do either of the child's parents have high cholesterol or are currently taking medications to treat cholesterol? No   Risk Factors: None    Dental Screening 2/20/2022   Has your adolescent seen a dentist? Yes   When was the last visit? 3 months to 6 months ago   Has your adolescent had cavities in the last 3 years? (!) YES- 1-2 CAVITIES IN THE LAST 3 YEARS- MODERATE RISK   Has your adolescent s parent(s), caregiver, or sibling(s) had any cavities in the last 2 years?  (!) YES, IN THE LAST 6 MONTHS- HIGH RISK     Dental Fluoride Varnish:   No, parent/guardian declines fluoride varnish.     Diet 2/20/2022   Do you have questions about your adolescent's eating?  No   Do you have questions about your adolescent's height or weight? No   What does your adolescent regularly drink? Water, Cow's milk, (!) JUICE, (!) POP, (!) ENERGY DRINKS, (!) COFFEE OR TEA   How often does your family eat meals together? (!) SOME DAYS   How many servings of fruits and vegetables does your adolescent eat a day? (!) 1-2   Does your adolescent get at least 3 servings of food or beverages that have calcium each day (dairy, green leafy vegetables, etc.)? Yes   Within the past 12 months, you worried that your food would run out before you got money to buy more. Never true   Within the past 12 months, the food you bought just didn't last and you didn't have money to get more. Never true   Patient eats 2-3 servings of fruits and vegetables a day. She  doesn't drink milk. She does not take a daily multivitamin.     Activity 2/20/2022   On average, how many days per week does your adolescent engage in moderate to strenuous exercise (like walking fast, running, jogging, dancing, swimming, biking, or other activities that cause a light or heavy sweat)? (!) 3 DAYS   On average, how many minutes does your adolescent engage in exercise at this level? (!) 30 MINUTES   What does your adolescent do for exercise?  Walk, gym class,ymca   What activities is your adolescent involved with?  [employer] ed, works at day care     Media Use 2/20/2022   How many hours per day is your adolescent viewing a screen for entertainment?  2hrs   Does your adolescent use a screen in their bedroom?  (!) YES     Sleep 2/20/2022   Does your adolescent have any trouble with sleep? No   Does your adolescent have daytime sleepiness or take naps? No   Patient falls asleep easily, however, she wakes up randomly throughout the night. She is only up for a couple minutes. In the morning she feels tired. When she has tried melatonin she wakes up early.     Vision/Hearing 2/20/2022   Do you have any concerns about your adolescent's hearing or vision? No concerns   Patient sees and hears well. She wears glasses and contacts.     Vision Screen  Vision Screen Details  Reason Vision Screen Not Completed: Patient has seen eye doctor in the past 12 months    Hearing Screen         School 2/20/2022   Do you have any concerns about your adolescent's learning in school? No concerns, (!) OTHER   Please specify: Being evaluated for adhd   What grade is your adolescent in school? 10th Grade   What school does your adolescent attend? [employer]   Does your adolescent typically miss more than 2 days of school per month? No   Currently she is getting B's and C's which is normal for her. She has friends. No problems with other kids.     Development / Social-Emotional Screen 2/20/2022   Does your child receive any  "special educational services? (!) SECTION 504 PLAN, (!) PSYCHOTHERAPY, (!) BEHAVIORAL THERAPY     Psycho-Social/Depression - PSC-17 required for C&TC through age 18  General screening:  Electronic PSC   PSC SCORES 2/20/2022   Inattentive / Hyperactive Symptoms Subtotal 3   Externalizing Symptoms Subtotal 1   Internalizing Symptoms Subtotal 7 (At Risk)   PSC - 17 Total Score 11   Follow up:  PSC-17 PASS (<15), no follow up necessary     Teen Screen  Teen Screen completed, reviewed and scanned document within chart    AMB [facility] MENSES SECTION 2/20/2022   What are your adolescent's periods like?  (!) IRREGULAR   Patient started birth control a few weeks ago. Her last period was 9 days when they typically last about 5 days.        Objective   Exam  BP 96/60   Ht 5' 1.75\" (1.568 m)   Wt 92 lb 8 oz (42 kg)   BMI 17.06 kg/m    20 %ile (Z= -0.84) based on CDC (Girls, 2-20 Years) Stature-for-age data based on Stature recorded on 2/21/2022.  5 %ile (Z= -1.63) based on CDC (Girls, 2-20 Years) weight-for-age data using vitals from 2/21/2022.  9 %ile (Z= -1.34) based on CDC (Girls, 2-20 Years) BMI-for-age based on BMI available as of 2/21/2022.  Blood pressure percentiles are 14 % systolic and 36 % diastolic based on the 2017 AAP Clinical Practice Guideline. This reading is in the normal blood pressure range.  Constitutional: She appears well-developed and well-nourished.   HEENT: Head: Normocephalic.    Right Ear: Tympanic membrane, external ear and canal normal.    Left Ear: Tympanic membrane, external ear and canal normal.    Nose: Nose normal.    Mouth/Throat: Mucous membranes are moist. Oropharynx is clear.    Eyes: Conjunctivae and lids are normal. Pupils are equal, round, and reactive to light. Optic discs are sharp.   Neck: Neck supple. No tenderness is present.   Cardiovascular: Normal rate and regular rhythm. No murmur heard.  Pulses: Femoral pulses are 2+ bilaterally.   Pulmonary/Chest: Effort normal and breath " sounds normal. There is normal air entry. Breast development is normal.  Calvin stage 4.   Abdominal: Soft. There is no hepatosplenomegaly. No inguinal hernia.   Musculoskeletal: Normal range of motion. Normal strength and tone. No abnormalities. Spine is straight. Normal duck walk.  Normal heel to toe walk.   : Normal external female genitalia.  Calvin stage 4.   Neurological: She is alert. She has normal reflexes. Gait normal.   Psychiatric: She has a normal mood and affect. Her speech is normal and behavior is normal.  Skin: Clear. No rashes.     ADDITIONAL HISTORY SUMMARIZED (2): None.  DECISION TO OBTAIN EXTRA INFORMATION (1): None.   RADIOLOGY TESTS (1): None.  LABS (1): Lab ordered.  MEDICINE TESTS (1): None.  INDEPENDENT REVIEW (2 each): None.       The visit consisted of 20 minutes spent on the date of the encounter doing chart review, history and exam, documentation, and further activities as noted above.     IPurnima, am scribing for and in the presence of, Dr. Molina.    I, Dr. Molina, personally performed the services described in this documentation, as scribed by Purnima Bernard in my presence, and it is both accurate and complete.    Total data points: 1  Clay Molina MD  [facility]

## 2022-12-13 NOTE — PROGRESS NOTES
[facility]  Progress Note - Venkatesh Arnold 1960, 65 y o  female MRN: [id number]  Unit/Bed#: E4 -01 Encounter: [id number]  Primary Care Provider: ALEX Carr   Date and time admitted to hospital: 10/21/2022  7:58 PM    * PAD (peripheral artery disease) ([facility] [address] )  Assessment & Plan  · She was initially admitted for nonhealing wound of the left lower extremity, requiring multiple endovascular interventions and ultimately underwent left AKA on 11/23/2022 and right AKA on 12/1/22  · Postoperative wound and pain management per primary service, plastic surgery  · Pain is  better controlled since her surgery  · Per vascular surgery evaluation today, wound healing is suboptimal  and they plan to debride the wound and possibly place a wound VAC  · Eventual short-term rehab placement when medically stable    Stroke [facility])  Assessment & Plan  · Noted to have change in mental status on 11/13/2022  · CT confirmed moderate right parietal lobe stroke, likely embolic in the setting of HIT with thrombosis  · She was treated with argatroban and currently on Coumadin only      HIT (heparin-induced thrombocytopenia)  Assessment & Plan  · Positive heparin antibody on 11/11/2022  · Completed 5 days of overlap with argatroban and Coumadin   · INR supratherapeutic  · Coumadin dose managed by primary service     Type 2 diabetes mellitus with hyperglycemia, with long-term current use of insulin [facility])  Assessment & Plan  Lab Results   Component Value Date    HGBA1C 9 8 (H) 10/25/2022     Recent Labs     12/12/22  2155 12/13/22  0753 12/13/22  1126 12/13/22  1514   POCGLU 138 154* 139 179*     Significantly reduced regimen from home due to poor oral intake  Continue Lantus to 8 units nightly plus sliding scale  Adjust  insulin regimen as needed   Hold mealtime insulin with meal when n p o  for possible procedure    Acute on chronic anemia  Assessment & Plan  · Monitor and  transfuse as needed    Benign essential hypertension  Assessment & Plan  Continue amlodipine 10 mg daily, doxazosin 4 mg at bedtime, metoprolol 25 mg daily, lisinopril 40 mg daily with hold parameters    Depression, recurrent ([address] )  Assessment & Plan  Evaluated by Psychiatry x 2 on this admission  ·  continue Zoloft 175 mg daily, Seroquel 25 mg at bedtime  VTE Pharmacologic Prophylaxis:   Pharmacologic: Warfarin (Coumadin)  Mechanical VTE Prophylaxis in Place: No    Patient Centered Rounds: I have performed bedside rounds with nursing staff today  Time Spent for Care: 20 minutes  More than 50% of total time spent on counseling and coordination of care as described above  Current Length of Stay: 52 day(s)    Current Patient Status: Inpatient   Certification Statement: The patient will continue to require additional inpatient hospital stay due to Wound    Discharge Plan: Short-term rehab    Code Status: Level 1 - Full Code      Subjective:   Seen and examined during rounds  pain still controlled  No nausea or vomiting  Appetite not 100%  Objective:     Vitals:   Temp (24hrs), Av 3 °F (36 8 °C), Min:98 3 °F (36 8 °C), Max:98 4 °F (36 9 °C)    Temp:  [98 3 °F (36 8 °C)-98 4 °F (36 9 °C)] 98 4 °F (36 9 °C)  HR:  [83-92] 92  Resp:  [18] 18  BP: (139-165)/(62-85) 161/85  SpO2:  [93 %-96 %] 93 %  Body mass index is 27 44 kg/m²  Input and Output Summary (last 24 hours): Intake/Output Summary (Last 24 hours) at 2022 1620  Last data filed at 2022 1516  Gross per 24 hour   Intake --   Output 1100 ml   Net -1100 ml       Physical Exam:     Physical Exam  Constitutional:       Appearance: She is not ill-appearing or diaphoretic  HENT:      Head: Normocephalic and atraumatic  Nose: No congestion or rhinorrhea  Eyes:      General: No scleral icterus  Cardiovascular:      Rate and Rhythm: Normal rate and regular rhythm     Pulmonary:      Effort: Pulmonary effort is normal  No respiratory distress  Breath sounds: No wheezing  Abdominal:      General: Abdomen is flat  Palpations: Abdomen is soft  Musculoskeletal:         General: Deformity present  Cervical back: Neck supple  Comments: Bilateral AKA   Neurological:      Mental Status: She is alert and oriented to person, place, and time  Psychiatric:         Mood and Affect: Mood normal          Behavior: Behavior normal          Additional Data:     Labs:    Results from last 7 days   Lab Units 12/12/22  0536   WBC Thousand/uL 13 41*   HEMOGLOBIN g/dL 7 4*   HEMATOCRIT % 24 6*   PLATELETS Thousands/uL 405*     Results from last 7 days   Lab Units 12/11/22  0533   SODIUM mmol/L 141   POTASSIUM mmol/L 3 6   CHLORIDE mmol/L 105   CO2 mmol/L 28   BUN mg/dL 12   CREATININE mg/dL 0 80   ANION GAP mmol/L 8   CALCIUM mg/dL 8 4   GLUCOSE RANDOM mg/dL 150*     Results from last 7 days   Lab Units 12/13/22  0557   INR  4 08*     Results from last 7 days   Lab Units 12/13/22  1514 12/13/22  1126 12/13/22  0753 12/12/22  2155 12/12/22  1545 12/12/22  1103 12/12/22  0734 12/11/22  2107 12/11/22  1613 12/11/22  1055 12/11/22  0735 12/10/22  2057   POC GLUCOSE mg/dl 179* 139 154* 138 136 156* 126 149* 142* 128 178* 159*                   * I Have Reviewed All Lab Data Listed Above  * Additional Pertinent Lab Tests Reviewed:  All Labs Within Last 24 Hours Reviewed    Imaging:    Imaging Reports Reviewed Today Include: None      Recent Cultures (last 7 days):           Last 24 Hours Medication List:   Current Facility-Administered Medications   Medication Dose Route Frequency Provider Last Rate   • acetaminophen  650 mg Oral Q6H PRN Adriane Hong MD     • ALPRAZolam  0 25 mg Oral Daily PRN Jennifer Goodwin PA-C     • aluminum-magnesium hydroxide-simethicone  30 mL Oral Q4H PRN Koko Sadler DO     • amLODIPine  10 mg Oral Daily Jennifer Goodwin PA-C     • atorvastatin  40 mg Oral Daily With Jose F Argueta KETURAH Hardin     • buPROPion  150 mg Oral Daily Valerie Millan MD     • cefazolin  2,000 mg Intravenous Q8H Nona Swanson MD 2,000 mg (12/13/22 2245)   • clopidogrel  75 mg Oral Daily Jeremi Dia PA-C     • collagenase   Topical Daily Phillip Yates MD     • doxazosin  4 mg Oral HS Silvano Pulling, DO     • gabapentin  400 mg Oral TID Viola Nails MD     • insulin glargine  8 Units Subcutaneous HS Silvano Pulling, DO     • insulin lispro  1-6 Units Subcutaneous TID [facility] Jeremi Dia PA-C     • labetalol  10 mg Intravenous Q6H PRN Silvano Pulling, DO     • lisinopril  40 mg Oral Daily Silvano Pulling, DO     • methocarbamol  1,000 mg Oral [facility] Viola Nails MD     • metoclopramide  10 mg Intravenous Q6H PRN Jeremi Dia PA-C     • metoprolol succinate  25 mg Oral Daily Silvano Pulling, DO     • naloxone  0 04 mg Intravenous Q1MIN PRN Jeremi Dia PA-C     • nystatin   Topical BID Jeremi Dia PA-C     • ondansetron  4 mg Intravenous Q6H PRN Jeremi Dia PA-C     • oxyCODONE  10 mg Oral Q12H [address] Viola Nails MD     • oxyCODONE  2 5 mg Oral Q4H PRN Viola Nails MD      Or   • oxyCODONE  5 mg Oral Q4H PRN Viola Nails MD      Or   • oxyCODONE  7 5 mg Oral Q4H PRN Viola Nails MD     • pantoprazole  40 mg Oral Early Morning Jeremi Dia PA-C     • polyethylene glycol  17 g Oral BID Jeremi Dia PA-C     • QUEtiapine  25 mg Oral HS Jeremi Dia PA-C     • senna  1 tablet Oral BID Jeremi Dia PA-C     • sertraline  175 mg Oral Daily Valerie Millan MD     • warfarin  2 5 mg Oral Once (warfarin) Viola Nails MD          Today, Patient Was Seen By: Remedios Madrid MD    ** Please Note: Dictation voice to text software may have been used in the creation of this document   **

## 2022-12-13 NOTE — UTILIZATION REVIEW
Continued Stay Review    Date: 12/13/22                          Current Patient Class: inpatient  Current Level of Care: med surg  HPI:62 y o  female initially admitted on 10/22/22   PAD, S/p multiple b/l endovascular interventions  JUSTIN  R hemispheric CVA  Assessment/Plan:   PAD s/p left AKA on 11/23/2022 and right AKA on 12/1/22 w/ proximal thigh wound debridements  Bilateral AKA with dressings in place, no strike through  IVABT continued at this time  CT confirmed moderate right parietal lobe stroke 58/30, likely embolic in the setting of HIT with thrombosis  Neuro grossly normal  Transitioned from [address] to Coumadin yesterday  Pt/ot in progress  Per vasc: Repeat INR yesterday 5 42, did not get Coumadin dose last night  Would titrate dose by decreasing dose to 2 5 today if remains supratherapeutic over foregoing nightly dose to avoid large swings in INR  Per onc: bridged from [address] to Coumadin, monitor labs  Goal INR 4-5 with agatroban, 2-3 when just on Coumadin      Vital Signs: /85 (BP Location: Left arm)   Pulse 83   Temp 98 3 °F (36 8 °C) (Temporal)   Resp 18   Ht 5' 4" (1 626 m)   Wt 72 5 kg (159 lb 13 3 oz)   SpO2 96%   BMI 27 44 kg/m²     Pertinent Labs/Diagnostic Results:     Results from last 7 days   Lab Units 12/12/22  0536 12/11/22  0533 12/10/22  0650 12/08/22  0514 12/07/22  0620   WBC Thousand/uL 13 41* 12 63* 12 88* 12 30* 9 63   HEMOGLOBIN g/dL 7 4* 7 4* 7 7* 7 7* 7 8*   HEMATOCRIT % 24 6* 24 9* 25 3* 25 4* 25 6*   PLATELETS Thousands/uL 405* 416* 425* 465* 447*     Results from last 7 days   Lab Units 12/11/22  0533 12/09/22  0352 12/08/22  0514 12/07/22  1800   SODIUM mmol/L 141 141 138 139   POTASSIUM mmol/L 3 6 4 0 4 7 4 6   CHLORIDE mmol/L 105 105 103 105   CO2 mmol/L 28 27 27 26   ANION GAP mmol/L 8 9 8 8   BUN mg/dL 12 14 12 14   CREATININE mg/dL 0 80 0 78 0 75 0 77   EGFR ml/min/1 73sq m 79 82 86 83   CALCIUM mg/dL 8 4 7 8* 7 0* 8 0*     Results from last 7 days   Lab Units 12/13/22  1126 12/13/22  0753 12/12/22  2155 12/12/22  1545 12/12/22  1103 12/12/22  0734 12/11/22  2107 12/11/22  1613 12/11/22  1055 12/11/22  0735 12/10/22  2057 12/10/22  1604   POC GLUCOSE mg/dl 139 154* 138 136 156* 126 149* 142* 128 178* 159* 144*     Results from last 7 days   Lab Units 12/11/22  0533 12/09/22  0352 12/08/22  0514 12/07/22  1800   GLUCOSE RANDOM mg/dL 150* 142* 154* 120     Results from last 7 days   Lab Units 12/13/22  0557 12/12/22  1830 12/12/22  1223 12/12/22  0536 12/12/22  0210 12/11/22  1244   PROTIME seconds 39 2* 49 0* 51 1* 48 4*  --   --    INR  4 08* 5 42* 5 73* 5 34*  --   --    PTT seconds  --   --   --  95* 98* 73*     Medications:   Scheduled Medications:  amLODIPine, 10 mg, Oral, Daily  atorvastatin, 40 mg, Oral, Daily With Dinner  buPROPion, 150 mg, Oral, Daily  cefazolin, 2,000 mg, Intravenous, Q8H  clopidogrel, 75 mg, Oral, Daily  collagenase, , Topical, Daily  doxazosin, 4 mg, Oral, HS  gabapentin, 400 mg, Oral, TID  insulin glargine, 8 Units, Subcutaneous, HS  insulin lispro, 1-6 Units, Subcutaneous, TID AC  lisinopril, 40 mg, Oral, Daily  methocarbamol, 1,000 mg, Oral, Q8H MICHELLE  metoprolol succinate, 25 mg, Oral, Daily  nystatin, , Topical, BID  oxyCODONE, 10 mg, Oral, Q12H MICHELLE  pantoprazole, 40 mg, Oral, Early Morning  polyethylene glycol, 17 g, Oral, BID  QUEtiapine, 25 mg, Oral, HS  senna, 1 tablet, Oral, BID  sertraline, 175 mg, Oral, Daily  warfarin, 2 5 mg, Oral, Once (warfarin)    PRN Meds:  acetaminophen, 650 mg, Oral, Q6H PRN  ALPRAZolam, 0 25 mg, Oral, Daily PRN  aluminum-magnesium hydroxide-simethicone, 30 mL, Oral, Q4H PRN  labetalol, 10 mg, Intravenous, Q6H PRN  metoclopramide, 10 mg, Intravenous, Q6H PRN  naloxone, 0 04 mg, Intravenous, Q1MIN PRN  ondansetron, 4 mg, Intravenous, Q6H PRN  oxyCODONE, 2 5 mg, Oral, Q4H PRN   Or  oxyCODONE, 5 mg, Oral, Q4H PRN   Or  oxyCODONE, 7 5 mg, Oral, Q4H PRN    Discharge Plan: tbd    Network Utilization Review Department  ATTENTION: Please call with any questions or concerns to [phone] and carefully listen to the prompts so that you are directed to the right person  All voicemails are confidential   Yani Quevedo all requests for admission clinical reviews, approved or denied determinations and any other requests to dedicated fax number below belonging to the campus where the patient is receiving treatment   List of dedicated fax numbers for the Facilities:  [address] DENIALS (Administrative/Medical Necessity) [phone]   [address] (Maternity/NICU/Pediatrics) [phone]   [address] [phone]   [facility] [address] [phone]   [address] [address] Tona [address] [phone]   [facility] 134 8[address] [facility] [phone]

## 2022-12-13 NOTE — ASSESSMENT & PLAN NOTE
Lab Results   Component Value Date    HGBA1C 9 8 (H) 10/25/2022     Recent Labs     12/12/22  2155 12/13/22  0753 12/13/22  1126 12/13/22  1514   POCGLU 138 154* 139 179*     Significantly reduced regimen from home due to poor oral intake  Continue Lantus to 8 units nightly plus sliding scale  Adjust  insulin regimen as needed   Hold mealtime insulin with meal when n p o  for possible procedure

## 2022-12-13 NOTE — PROGRESS NOTES
Medical Oncology/Hematology Progress Note  Tuan Pal, female, 65 y o , 1960,  Sarthak Rincon 4 /E4 -*, [phone]     Reason for initial consultation (11/15/22): Anemia  LOS: 53 days as of 12/13/22    Assessment and Plan:  1  HIT (heparin-induced thrombocytopenia)  -Confirmed heparin-induced platelet antibody on 11/11/2022   -Started on argatroban on 11/11/22  Currently being Recommended transitioned from argatroban gtt to Coumadin  The plan was to have patient be on Coumadin for at least a month to treat HIT  Of note, patient has satisfied this time frame  -INR trend: 4 08 (12/13) <--5 42 (12/12) <-4 45 (12/11) <--1 73 (12/8) <--2 46 (12/7) <--6 19 (12/6) given Xarelto the night before <--no available results (12/5)  Xarelto discontinued, primary team confirmed that Coumadin was the preferred anticoagulant on 12/5/22 while still being bridged from argatroban gtt  PTT trend: No available PTT result for 12/13  95 (12/12) <--73 (12/11) <--71 (12/10) <--67 (12/10) <--57 (12/8) <--62 (12/7) <--76 (12/6) <--163 (12/6)      ++++++++++++++++++++++++++++++++++++++++++++++++++++++++++++++  Argatroban standard dosing following PTT trend:   below 60: increase by 0 5 mcg/kg/min  60-90: no change  : decrease by 0 5 mcg/kg/min  102-121: hold infusion for 1 hour, resume infusion at 50% of previous rate  Above 121: hold infusion, notify physician, check PTT q2 hours after resuming, then [id number] as above  +++++++++++++++++++++++++++++++++++++++++++++++++++++++++++++++    2  Acute on chronic anemia  -Anemia of chronic disease exacerbated by hospitalization and surgical interventions as outlined under peripheral vascular disease  -Evident since admission on 10/21/22, 10 1 g/dL   No lab results in between 2/2022 and day of admission   -Hemoglobin trend: 7 4 (12/12) <--7 4 (12/11) <--7 7 (12/10) <--7 7 (12/8) <--7 6 (11/30) <--7 5 (11/28) <--9 0 (11/27) <--7 6 (11/26) <--4 8 (11/24) L AKA procedure <--6 4 (11/15)  MCV 85, MCHC 30 1  -Iron panel: iron saturation 12%, ferritin 583, most likely d/t inflammation s/p BL AKA    -s/p right CFA balloon angioplasty angiogram/atherectomy, CFA stent placement on 11/7/22, experienced some perioperative blood loss  -Period of NYDIA, was followed by Nephrology; now resolved  -Denies acute bleeding; hematuria, epistaxis, hematemesis, hematochezia  Drops in hemoglobin coincide with surgical interventions for this inpatient stay    2  Peripheral vascular disease   -Presence of non-healing left heel wound lower, s/p right CFA balloon angioplasty  -Angiogram/atherectomy, CFA stent placement on 11/7/22; experienced some perioperative blood loss  -s/p L AKA on 11/23/22  R AKA on 11/30/22  Followed by Vascular Surgery  3  Stroke  -Multiple vascular procedures/interventions in [facility], complicated by atheroembolic events    -CT head was obtained on 11/13/22 precipitated by mental status change  Results showed moderate size acute/recent infarct in the right parietal lobe with local mass effect     -Etiology of infarct unclear; has no obvious focal deficits  LABS (12/13/22): Hemoglobin 7 4 g/dL  Platelets 781V   WBC 13 41, remains elevated since s/p R AKA  Afebrile  No signs of infection  Incisions on  AKAs C/D/I  PLAN:  1) Patient on active bridging/transition from argatroban drip to warfarin  Continue with titrating argatroban gtt until therapeutic level achieved as outlined on the normogram above  Warfarin  started on 12/7/22  Goal to achieve INR between 4-5 with the combination of argatroban and warfarin for at least 5 days to ensure stability  Once argatroban gtt has been stopped, INR should be repeated in 4-6 hours  If INR is below therapeutic level, argatroban may be restarted  Repeat procedure daily until desired INR on warfarin alone is achieved (goal 2-3 on warfarin alone)  Vascular following as well  2) Confirmed heparin-induced platelet antibody on 11/11/2022  Stroke may be considered provoked; patient will need to be on Coumadin for at least 3 months  3) Continue to monitor daily CBCs, PTT and INR    4) Continue transfusion support for hemoglobin <7 g/dL, or if bleeding  Hemoglobin stable at 7 4 g/dl  5) Outpatient follow up plan: Will need follow-up in the outpatient hematology office within 3 months  To be set up by inaptient Cancer Coordnator, YOLA Rebolledo    Communication with team:  Communicated with primary team  Reviewed this patient with Dr Jamal Arevalo  Please do not hesitate to reach out for any questions or concerns  Troy Matute, RONALD, CRNP  Hematology-Oncology    Interval Hx:  Patient reported feeling a bit anxious  She reported getting nervous when clinicians tell her that her INR is quite high  Discussed with her that the plan was to keep her INR between 4-5 with the combination of argatroban and warfarin, but once she's only on warfarin, we'd to see her INR to be between 2-3  She expressed gratitude for the explanation  She is looking forward to going to rehab soon, then going home  She understands that she will need to adapt quickly to her BL amputations  Denies SOB, acute bleeding  Denies n/v, headaches, diplopia  Complains of tenderness on BL stumps  History of present illness:  Maribeth Stauffer 65 y o  female with history of insulin dependent DM , HTN, HLD, obesity, depression, initially presenting on 10/22 for non-healing LE wounds and subsequent prolonged hospitalization  She is s/p BL AKAs  She has a long-standing hx of PAD s/p multiple BLE endovascular interventions c/b atheroembolic events to BLE, now with subacute right-sided CVA on CT head  Results showed moderate size acute/recent infarct in the right parietal lobe with local mass effect  This was obtained on 11/13 precipitated by mental status change  Etiology of infarct unclear; need to r/o if cardioembolic source/septic emboli   Patient has no focal deficits on examination  She also met sepsis criteria  She developed NYDIA, now resolved  Hematology was nitially consulted to offer recommendations regarding her anemia  Patient was also confirmed to have 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) that was first diagnosed on 11/11/2022  Heparin stopped, and was started on argatroban on the same day as managed by primary service  She is currently being bridged/transitioned from argatroban gtt to warfarin  Patient will need to be on Coumadin for at least three months; stroke suspected to be provoked  Discussed with primary team  Patient's hemoglobin stable today at 7 4, doing well with post-operative procedures  R AKA C/D/I with stitches, L AKA C/D/I with staples  Patient's goals are focused on getting acclimated to her bilateral above-the-knee amputations  Review of Systems:   Review of Systems   Constitutional: Positive for activity change and fatigue  Negative for chills and fever  HENT: Negative for ear pain and sore throat  Eyes: Negative for pain and visual disturbance  Respiratory: Negative for cough and shortness of breath  Cardiovascular: Negative for chest pain and palpitations  Gastrointestinal: Negative for abdominal pain and vomiting  Genitourinary: Negative for dysuria and hematuria  Musculoskeletal: Negative for arthralgias and back pain  BL LE pain   Skin: Positive for pallor  Negative for color change and rash  Neurological: Positive for weakness  Negative for seizures and syncope  Psychiatric/Behavioral: The patient is nervous/anxious  All other systems reviewed and are negative  Oncology History:   Cancer Staging  No matching staging information was found for the patient  Oncology History    No history exists         Past Medical History:   Past Medical History:   Diagnosis Date   • Anxiety    • Depression    • Diabetes ([facility] )    • Diabetes mellitus ([facility] )    • Esophageal reflux     28 APR 2015 RESOLVED   • Hyperlipidemia • Hypertension    • Low back pain     sciatica   • Pneumonia    • Stroke ([facility] [address] ) 2015    small vessel, L frontal corona radiata  Rx asa 81, Lipitor 40, risk modification   • Vitamin D deficiency        Past Surgical History:   Procedure Laterality Date   • AMPUTATION ABOVE KNEE (AKA) Right 2022    Procedure: (AKA), PSB  BKA;  Surgeon: Paulo Clemente DO;  Location: [facility];  Service: Vascular   • ARTERIOGRAM Right 2022    Procedure: -Right lower extremity angiogram -Right CFA balloon angioplasty with [id number]  [employer]  -Right CFA DCB with 6x40 Bard Lutonix -Right CFA atherectomy with Jetstream and distal embolization protection with 7mm Spider FX -Right SFA/Pop angioplasty with 4x40 Chololate balloon -Right SFA/Pop DCB with 5x150 Bard Lutonix -Right SFA stent with [employer] x2 -R   • COLONOSCOPY     • IR AORTAGRAM WITH RUN-OFF  10/31/2022   • IR LOWER EXTREMITY ANGIOGRAM  11/10/2022   • IR LOWER EXTREMITY ANGIOGRAM  2022   • IA AMPUTATE THIGH,THRU FEMUR Left 2022    Procedure: (AKA);   Surgeon: Consuelo Stafford DO;  Location: [facility];  Service: Vascular       Family History   Problem Relation Age of Onset   • Diabetes Mother    • Lung cancer Mother    • Cancer Father    • Lung cancer Father    • Hypertension Brother    • Hypertension Family        Social History     Socioeconomic History   • Marital status: Single     Spouse name: None   • Number of children: None   • Years of education: None   • Highest education level: None   Occupational History   • None   Tobacco Use   • Smoking status: Former     Types: Cigarettes     Quit date:      Years since quittin 9   • Smokeless tobacco: Never   Vaping Use   • Vaping Use: Never used   Substance and Sexual Activity   • Alcohol use: Never     Comment: OCCASIONAL ALCOHOL USE IN ALL SCRIPTS   • Drug use: No   • Sexual activity: None   Other Topics Concern   • None   Social History Narrative   • None     Social Determinants of Health     Financial Resource Strain: Not on file   Food Insecurity: Not on file   Transportation Needs: Not on file   Physical Activity: Not on file   Stress: Not on file   Social Connections: Not on file   Intimate Partner Violence: Not on file   Housing Stability: Low Risk    • Unable to Pay for Housing in the Last Year: No   • Number of Places Lived in the Last Year: 1   • Unstable Housing in the Last Year: No         Current Facility-Administered Medications:   •  acetaminophen (TYLENOL) tablet 650 mg, 650 mg, Oral, Q6H PRN, Adriane Hong MD  •  ALPRAZolam Lorena Chi) tablet 0 25 mg, 0 25 mg, Oral, Daily PRN, Merl Knee, PA-C, 0 25 mg at 12/05/22 2632  •  aluminum-magnesium hydroxide-simethicone (MYLANTA) oral suspension 30 mL, 30 mL, Oral, Q4H PRN, Iline Mitten, DO, 30 mL at 12/09/22 2306  •  amLODIPine (NORVASC) tablet 10 mg, 10 mg, Oral, Daily, Merl Knee, PA-C, 10 mg at 12/13/22 9655  •  atorvastatin (LIPITOR) tablet 40 mg, 40 mg, Oral, Daily With Dinner, Merl Knee, PA-C, 40 mg at 12/12/22 1709  •  buPROPion (WELLBUTRIN XL) 24 hr tablet 150 mg, 150 mg, Oral, Daily, Prabhakar Noel MD, 150 mg at 12/13/22 0857  •  ceFAZolin (ANCEF) IVPB (premix in dextrose) 2,000 mg 50 mL, 2,000 mg, Intravenous, Q8H, Delfin Cuello MD, Stopped at 12/13/22 0259  •  clopidogrel (PLAVIX) tablet 75 mg, 75 mg, Oral, Daily, Merl Knee, PA-C, 75 mg at 12/13/22 7115  •  collagenase (SANTYL) ointment, , Topical, Daily, Janie Peters MD, Given at 12/13/22 4048  •  doxazosin (CARDURA) tablet 4 mg, 4 mg, Oral, HS, Iline Mitten, DO, 4 mg at 12/12/22 2155  •  gabapentin (NEURONTIN) capsule 400 mg, 400 mg, Oral, TID, Jac Maria MD, 400 mg at 12/13/22 0856  •  insulin glargine (LANTUS) subcutaneous injection 8 Units 0 08 mL, 8 Units, Subcutaneous, HS, Koko Sadler DO, 8 Units at 12/12/22 2156  •  insulin lispro (HumaLOG) 100 units/mL subcutaneous injection 1-6 Units, 1-6 Units, Subcutaneous, TID AC, 1 Units at 12/12/22 1223 **AND** Fingerstick Glucose (POCT), , , TID AC, Jailyn Cerna PA-C  •  labetalol (NORMODYNE) injection 10 mg, 10 mg, Intravenous, Q6H PRN, Dakotah Kinza, DO, 10 mg at 12/10/22 0359  •  lisinopril (ZESTRIL) tablet 40 mg, 40 mg, Oral, Daily, Dakotah Kinza, DO, 40 mg at 12/13/22 9137  •  methocarbamol (ROBAXIN) tablet 1,000 mg, 1,000 mg, Oral, Q8H [address], Gabby Chun MD, 1,000 mg at 12/13/22 1543  •  metoclopramide (REGLAN) injection 10 mg, 10 mg, Intravenous, Q6H PRN, Radhika Zaragoza PA-C  •  metoprolol succinate (TOPROL-XL) 24 hr tablet 25 mg, 25 mg, Oral, Daily, Dakotah Kinza, DO, 25 mg at 12/13/22 4435  •  naloxone (NARCAN) 0 04 mg/mL syringe 0 04 mg, 0 04 mg, Intravenous, Q1MIN PRN, Radhika Zaragoza PA-C  •  nystatin (MYCOSTATIN) powder, , Topical, BID, Radhika Zaragoza PA-C, Given at 12/13/22 2004  •  [facility]etron [facility]) injection 4 mg, 4 mg, Intravenous, Q6H PRN, Radhika Zaragoza PA-C, 4 mg at 11/24/22 7901  •  oxyCODONE (OxyCONTIN) 12 hr tablet 10 mg, 10 mg, Oral, Q12H [address], Gabby Chun MD, 10 mg at 12/13/22 0856  •  oxyCODONE (ROXICODONE) IR tablet 2 5 mg, 2 5 mg, Oral, Q4H PRN **OR** oxyCODONE (ROXICODONE) IR tablet 5 mg, 5 mg, Oral, Q4H PRN, 5 mg at 12/13/22 0548 **OR** oxyCODONE (ROXICODONE) IR tablet 7 5 mg, 7 5 mg, Oral, Q4H PRN, Gabby Chun MD, 7 5 mg at 12/12/22 1159  •  pantoprazole (PROTONIX) EC tablet 40 mg, 40 mg, Oral, Early Morning, RAMAN Lai-C, 40 mg at 12/13/22 5666  •  polyethylene glycol (MIRALAX) packet 17 g, 17 g, Oral, BID, RAMAN Lai-C, 17 g at 12/09/22 0895  •  QUEtiapine (SEROquel) tablet 25 mg, 25 mg, Oral, HS, RAMAN Lai-C, 25 mg at 12/12/22 2155  •  senna (SENOKOT) tablet 8 6 mg, 1 tablet, Oral, BID, Radhika Zaragoza PA-C, 8 6 mg at 12/09/22 1802  •  sertraline (ZOLOFT) tablet 175 mg, 175 mg, Oral, Daily, Jodie Linda Laura MD, 175 mg at 12/13/22 9321  •  warfarin (COUMADIN) tablet 2 5 mg, 2 5 mg, Oral, Once (warfarin), Casey Calderon MD    Medications Prior to Admission   Medication   • ALPRAZolam (XANAX) 0 25 mg tablet   • amLODIPine (NORVASC) 10 mg tablet   • aspirin 81 mg chewable tablet   • atorvastatin (LIPITOR) 40 mg tablet   • clotrimazole-betamethasone (LOTRISONE) 1-0 05 % cream   • fluocinonide (LIDEX) 0 05 % ointment   • gabapentin (NEURONTIN) 100 mg capsule   • insulin aspart (NovoLOG FlexPen) 100 UNIT/ML injection pen   • insulin glargine (Lantus SoloStar) 100 units/mL injection pen   • lisinopril (ZESTRIL) 40 mg tablet   • metoprolol succinate (TOPROL-XL) 25 mg 24 hr tablet   • Multiple Vitamin (multivitamin) tablet   • sertraline (ZOLOFT) 100 mg tablet   • Trulicity 1 59 DG/0 2MP SOPN   • Cholecalciferol (Vitamin D-3) 25 MCG (1000 UT) CAPS   • Continuous Blood Gluc  (FreeStyle Noé 14 Day Potter) LITO   • Continuous Blood Gluc Sensor (FreeStyle Noé 14 Day Sensor) MISC   • Doxylamine Succinate, Sleep, (UNISOM PO)   • glucose blood (True Metrix Blood Glucose Test) test strip   • Insulin Pen Needle (B-D UF III MINI PEN NEEDLES) 31G X 5 MM MISC   • Lancets 28G MISC   • pantoprazole (PROTONIX) 40 mg tablet   • potassium chloride (K-DUR,KLOR-CON) 10 mEq tablet       Allergies   Allergen Reactions   • Heparin Other (See Comments)     History of HIT         Physical Exam:     /85 (BP Location: Left arm)   Pulse 83   Temp 98 3 °F (36 8 °C) (Temporal)   Resp 18   Ht 5' 4" (1 626 m)   Wt 72 5 kg (159 lb 13 3 oz)   SpO2 96%   BMI 27 44 kg/m²     Physical Exam  Constitutional:       Appearance: She is obese  HENT:      Head: Normocephalic  Mouth/Throat:      Mouth: Mucous membranes are dry  Comments: edentulous  Eyes:      Conjunctiva/sclera: Conjunctivae normal    Cardiovascular:      Rate and Rhythm: Normal rate and regular rhythm     Pulmonary:      Effort: Pulmonary effort is normal  Comments: room air  Abdominal:      General: Bowel sounds are normal       Palpations: Abdomen is soft  Musculoskeletal:      Right lower leg: Edema present  Left lower leg: Edema present  Comments: R AKA incision, C/D/I with stitches  L AKA incision, C/D/I with staples   Skin:     General: Skin is warm and dry  Findings: Bruising present  Neurological:      General: No focal deficit present  Mental Status: She is alert and oriented to person, place, and time  Motor: Weakness present     Psychiatric:         Mood and Affect: Mood normal            Recent Results (from the past 48 hour(s))   Fingerstick Glucose (POCT)    Collection Time: 12/11/22 10:55 AM   Result Value Ref Range    POC Glucose 128 65 - 140 mg/dl   APTT    Collection Time: 12/11/22 12:44 PM   Result Value Ref Range    PTT 73 (H) 23 - 37 seconds   Fingerstick Glucose (POCT)    Collection Time: 12/11/22  4:13 PM   Result Value Ref Range    POC Glucose 142 (H) 65 - 140 mg/dl   Fingerstick Glucose (POCT)    Collection Time: 12/11/22  9:07 PM   Result Value Ref Range    POC Glucose 149 (H) 65 - 140 mg/dl   APTT    Collection Time: 12/12/22  2:10 AM   Result Value Ref Range    PTT 98 (H) 23 - 37 seconds   Protime-INR    Collection Time: 12/12/22  5:36 AM   Result Value Ref Range    Protime 48 4 (H) 11 6 - 14 5 seconds    INR 5 34 (HH) 0 84 - 1 19   CBC    Collection Time: 12/12/22  5:36 AM   Result Value Ref Range    WBC 13 41 (H) 4 31 - 10 16 Thousand/uL    RBC 2 89 (L) 3 81 - 5 12 Million/uL    Hemoglobin 7 4 (L) 11 5 - 15 4 g/dL    Hematocrit 24 6 (L) 34 8 - 46 1 %    MCV 85 82 - 98 fL    MCH 25 6 (L) 26 8 - 34 3 pg    MCHC 30 1 (L) 31 4 - 37 4 g/dL    RDW 17 2 (H) 11 6 - 15 1 %    Platelets 854 (H) 815 - 390 Thousands/uL    MPV 8 0 (L) 8 9 - 12 7 fL   APTT    Collection Time: 12/12/22  5:36 AM   Result Value Ref Range    PTT 95 (H) 23 - 37 seconds   Fingerstick Glucose (POCT)    Collection Time: 12/12/22  7:34 AM   Result Value Ref Range    POC Glucose 126 65 - 140 mg/dl   Fingerstick Glucose (POCT)    Collection Time: 12/12/22 11:03 AM   Result Value Ref Range    POC Glucose 156 (H) 65 - 140 mg/dl   Protime-INR    Collection Time: 12/12/22 12:23 PM   Result Value Ref Range    Protime 51 1 (H) 11 6 - 14 5 seconds    INR 5 73 (HH) 0 84 - 1 19   Fingerstick Glucose (POCT)    Collection Time: 12/12/22  3:45 PM   Result Value Ref Range    POC Glucose 136 65 - 140 mg/dl   Protime-INR    Collection Time: 12/12/22  6:30 PM   Result Value Ref Range    Protime 49 0 (H) 11 6 - 14 5 seconds    INR 5 42 (HH) 0 84 - 1 19   Fingerstick Glucose (POCT)    Collection Time: 12/12/22  9:55 PM   Result Value Ref Range    POC Glucose 138 65 - 140 mg/dl   Protime-INR    Collection Time: 12/13/22  5:57 AM   Result Value Ref Range    Protime 39 2 (H) 11 6 - 14 5 seconds    INR 4 08 (H) 0 84 - 1 19   Fingerstick Glucose (POCT)    Collection Time: 12/13/22  7:53 AM   Result Value Ref Range    POC Glucose 154 (H) 65 - 140 mg/dl       CTA head and neck w wo contrast    Result Date: 11/13/2022  Narrative: CTA NECK AND BRAIN WITH AND WITHOUT CONTRAST INDICATION: Stroke/TIA, determine embolic source stroke COMPARISON:   Same date CT TECHNIQUE:  Routine CT imaging of the Brain without contrast   Post contrast imaging was performed after administration of iodinated contrast through the neck and brain  Post contrast axial 0 625 mm images timed to opacify the arterial system  3D rendering was performed on an independent workstation  MIP reconstructions performed  Coronal reconstructions were performed of the noncontrast portion of the brain  Radiation dose length product (DLP) for this visit:  578 mGy-cm   This examination, like all CT scans performed in the [facility], was performed utilizing techniques to minimize radiation dose exposure, including the use of iterative reconstruction and automated exposure control     IV Contrast:  85 mL of iohexol (OMNIPAQUE)  IMAGE QUALITY:   Diagnostic FINDINGS: NONCONTRAST BRAIN PARENCHYMA:  Right parietal subacute ischemia  VENTRICLES AND EXTRA-AXIAL SPACES:  Normal for the patient's age  VISUALIZED ORBITS AND PARANASAL SINUSES:  Unremarkable  CERVICAL VASCULATURE AORTIC ARCH AND GREAT VESSELS:  Mild atherosclerotic disease of the arch, proximal great vessels and visualized subclavian vessels  No significant stenosis  RIGHT VERTEBRAL ARTERY CERVICAL SEGMENT:  Moderate stenosis at the origin  The vessel is normal in caliber throughout the neck  LEFT VERTEBRAL ARTERY CERVICAL SEGMENT:  Moderate stenosis at the origin  The vessel is normal in caliber throughout the neck  RIGHT EXTRACRANIAL CAROTID SEGMENT:  Bilateral common carotid retropharyngeal course with short segment moderate plaque stenosis  Normal bifurcation and cervical internal carotid artery  No stenosis or dissection  LEFT EXTRACRANIAL CAROTID SEGMENT:  Mild atherosclerotic disease of the distal common carotid artery and proximal cervical internal carotid artery without significant stenosis compared to the more distal ICA  NASCET criteria was used to determine the degree of internal carotid artery diameter stenosis  INTRACRANIAL VASCULATURE INTERNAL CAROTID ARTERIES:  Normal enhancement of the intracranial portions of the internal carotid arteries  Normal ophthalmic artery origins  Normal ICA terminus  ANTERIOR CIRCULATION:  Symmetric A1 segments and anterior cerebral arteries with normal enhancement  Normal anterior communicating artery  MIDDLE CEREBRAL ARTERY CIRCULATION:  M1 segment and middle cerebral artery branches demonstrate normal enhancement bilaterally  DISTAL VERTEBRAL ARTERIES:  Normal distal vertebral arteries  Posterior inferior cerebellar artery origins are normal  Normal vertebral basilar junction  BASILAR ARTERY:  Basilar artery is normal in caliber  Normal superior cerebellar arteries   POSTERIOR CEREBRAL ARTERIES: Both posterior cerebral arteries arises from the basilar tip  Both arteries demonstrate normal enhancement  Normal posterior communicating arteries  VENOUS STRUCTURES:  Normal  NON VASCULAR ANATOMY BONY STRUCTURES:  No acute osseous abnormality  SOFT TISSUES OF THE NECK:  Unremarkable  THORACIC INLET:  Small bilateral pleural effusions, partially visualized  Impression: Moderate bilateral vertebral artery origin plaque stenosis  Bilateral common carotid retropharyngeal course with short segment moderate plaque stenosis  No occlusion or dissection  Workstation performed: [id number]     XR chest portable    Result Date: 11/13/2022  Narrative: CHEST INDICATION:   sirs  COMPARISON:  Chest radiograph from November 10, 2022 EXAM PERFORMED/VIEWS:  XR CHEST PORTABLE FINDINGS: Cardiomediastinal silhouette appears unremarkable  Mild pulmonary edema  Bibasilar atelectasis  No pneumothorax  Trace layering effusions  Osseous structures appear within normal limits for patient age  Impression: Mild pulmonary edema and trace layering pleural effusions  No definite consolidation  If continued clinical concern, suggest follow-up frontal and lateral views for further evaluation  Workstation performed: [id number]     XR chest portable    Result Date: 11/10/2022  Narrative: CHEST INDICATION:   cough/wheezing  COMPARISON:  CXR 11/8/2022, abdomen CT 11/9/2022, chest CT 2/23/2018  EXAM PERFORMED/VIEWS:  XR CHEST PORTABLE FINDINGS: Cardiomediastinal silhouette appears unremarkable  Mild pulmonary venous congestion  Question trace right effusion  No pneumothorax  Osseous structures appear within normal limits for patient age  Impression: Mild pulmonary venous congestion  Question trace right effusion  Workstation performed: [id number]     XR chest portable    Result Date: 10/31/2022  Narrative: CHEST INDICATION:   new hypoxia, concern for CHF   COMPARISON:  Chest radiograph October 26, 2022 EXAM PERFORMED/VIEWS:  XR CHEST PORTABLE FINDINGS: Heart shadow is enlarged but unchanged from prior exam  Diffuse bilateral reticular opacities have increased since prior study  No definite pleural effusion  No pneumothorax  Osseous structures appear within normal limits for patient age  Impression: Pulmonary edema has increased since October 26, 2022  Workstation performed: [id number]     XR chest portable    Result Date: 10/27/2022  Narrative: CHEST INDICATION:   dyspnea  COMPARISON:  Chest radiograph dated 10/21/2022  EXAM PERFORMED/VIEWS:  XR CHEST PORTABLE FINDINGS: Cardiomediastinal silhouette appears unremarkable  Mild pulmonary vascular congestion  No pneumothorax or pleural effusion  Osseous structures appear within normal limits for patient age  Impression: Mild pulmonary vascular congestion  Workstation performed: [id number]     XR chest 1 view portable    Result Date: 10/22/2022  Narrative: CHEST INDICATION:   OREILLY  COMPARISON:  02/06/2020 EXAM PERFORMED/VIEWS:  XR CHEST PORTABLE 1 image FINDINGS: Cardiomediastinal silhouette appears unremarkable  The lungs are clear  No pneumothorax or pleural effusion  Osseous structures appear within normal limits for patient age  Impression: No acute cardiopulmonary disease  Workstation performed: [id number]     XR foot 3+ views RIGHT    Result Date: 10/22/2022  Narrative: RIGHT FOOT INDICATION:   necrotic R 5th digit  COMPARISON:  None VIEWS:  XR FOOT 3+ VW RIGHT Images: 3 FINDINGS: There is no acute fracture or dislocation  No focal areas of bony destruction  An inferior calcaneal spur is present  Spurring in the midfoot  No lytic or blastic osseous lesion  There are atherosclerotic calcifications  Soft tissue swelling about the 5th toe  Soft tissues are otherwise unremarkable  Impression: No acute osseous abnormality   Workstation performed: [id number]     CT head wo contrast    Result Date: 11/15/2022  Narrative: CT BRAIN - WITHOUT CONTRAST INDICATION:   Stroke, follow up Stroke re eval  COMPARISON:  CTA head and neck with and without contrast November 13, 2022  TECHNIQUE:  CT examination of the brain was performed  In addition to axial images, sagittal and coronal 2D reformatted images were created and submitted for interpretation  Radiation dose length product (DLP) for this visit:  1036 mGy-cm   This examination, like all CT scans performed in the [facility], was performed utilizing techniques to minimize radiation dose exposure, including the use of iterative reconstruction and automated exposure control  IMAGE QUALITY:  Diagnostic  FINDINGS: PARENCHYMA: Evolving acute infarct in right parietal-temporal-occipital lobes with questionable petechial cortical hemorrhage in right parietal lobe  No acute parenchymal hematoma  Decreased attenuation is noted in periventricular and subcortical white matter demonstrating an appearance that is statistically most likely to represent mild microangiopathic change  No intracranial mass, mass effect or midline shift  Arterial calcifications of carotid siphons  VENTRICLES AND EXTRA-AXIAL SPACES:  Normal for the patient's age  VISUALIZED ORBITS AND PARANASAL SINUSES:  Orbits are normal   Small right maxillary retention cyst  CALVARIUM AND EXTRACRANIAL SOFT TISSUES:  Normal      Impression: Evolving acute infarct in right parietal-temporal-occipital lobes with questionable petechial cortical hemorrhage in right parietal lobe  No new acute intracranial abnormality  The study was marked in [facility] for immediate notification  Workstation performed: [id number]     CT head wo contrast    Result Date: 11/13/2022  Narrative: CT BRAIN - WITHOUT CONTRAST INDICATION:   Mental status change, unknown cause change in mental status, on full anticoagulation  COMPARISON:  2/25/2018 TECHNIQUE:  CT examination of the brain was performed  In addition to axial images, sagittal and coronal 2D reformatted images were created and submitted for interpretation  Radiation dose length product (DLP) for this visit:  1838 mGy-cm   This examination, like all CT scans performed in the [facility], was performed utilizing techniques to minimize radiation dose exposure, including the use of iterative reconstruction and automated exposure control  IMAGE QUALITY:  Motion artifact limits portions of the study  Portions of the study were repeated  Some diagnostic information is obtained  FINDINGS: PARENCHYMA:  There is a moderate region of wedge-shaped hypodensity extending from the periventricular margin to the cortical surface predominantly in the right parietal lobe indicative of a moderate, acute/recent infarction  There is no large hemorrhage  There is local sulcal effacement and mass effect without subfalcine herniation  Atherosclerotic calcifications of the cavernous segment of the internal carotid artery are mild  VENTRICLES AND EXTRA-AXIAL SPACES:  Mild effacement of the occipital horn of the right lateral ventricle secondary to adjacent cytotoxic edema in the right parietal lobe  VISUALIZED ORBITS AND PARANASAL SINUSES:  Unremarkable  CALVARIUM AND EXTRACRANIAL SOFT TISSUES:  Normal      Impression: 1  Moderate-sized acute/recent infarction centered on the right parietal lobe with local mass effect  No acute intracranial hemorrhage  Workstation performed: [id number]     CTA ABDOMEN W RUN OFF W WO CONTRAST    Result Date: 11/9/2022  Narrative: CT ANGIOGRAM OF THE AORTA AND LOWER EXTREMITIES WITH IV CONTRAST INDICATION:  Chronic limb ischemia with tissue loss in both legs  Previous endovascular interventions   COMPARISON: To CTA studies and 2 arteriograms since October 27, 2022 TECHNIQUE:  CT angiogram examination of the abdomen, pelvis, and lower extremities was performed according to standard protocol with intravenous contrast   This examination, like all CT scans performed in the [facility], was performed utilizing techniques to minimize radiation dose exposure, including the use of iterative reconstruction and automated exposure control  3D reconstructions were performed an independent workstation, and are supplied for review  Rad dose 3041 mGy-cm IV Contrast:  145 mL of iohexol (OMNIPAQUE)  FINDINGS: VASCULAR STRUCTURES:   Proximal right common iliac artery stent does not look opacified on CTA  No other significant common or external iliac lesion on the right side  Right common femoral arteries mildly calcified but without focal stenosis  Common femoral bifurcation filling defect is no longer present  No clear evidence of residual dissection  Right SFA is diffusely small with multiple mild stenoses proximally  Long stent within mid and distal SFA seems patent but unable to determine any residual stenosis due to stent artifact and very small luminal caliber  Popliteal artery is patent with multiple mild stenoses throughout and diffusely small caliber  No focal flow-limiting stenosis is identified  Anterior tibial and posterior tibial arteries are intact to the ankle  Posterior tibial artery occludes proximal to the calcaneus  Dorsalis pedis artery is severely stenotic distally and possibly segmentally occluded  Peroneal artery is patent but communicating arteries are not opacified  In the left leg, there is a calcified stenosis at the origin the left common iliac difficult to quantify  It is not well seen on prior arteriograms  Based on reconstructions the lesion measures 30-40% in severity  No other common or external iliac lesion  No significant left common femoral stenosis  Left SFA is diffusely small and diseased  Recent proximal and distal stenting  Proximal left SFA stent appears patent  Distal stent is occluded  Popliteal artery is severely stenotic distal to the stent  Below-knee popliteal artery is diffusely stenotic with severe stenosis at origin of the anterior tibial artery    Anterior tibial artery is patent  Posterior tibial artery is only segmentally seen proximally and occluded distally  Peroneal artery is patent  Dorsalis pedis arteries patent  OTHER FINDINGS ABDOMEN LOWER CHEST:  Small bilateral pleural effusions  LIVER/BILIARY TREE:  Unremarkable  GALLBLADDER:  No calcified gallstones  No pericholecystic inflammatory change  SPLEEN:  Asymmetric hyperperfusion into the upper pole of uncertain significance  Not seen previously  Maybe related to phase of contrast   Splenic artery is patent  No suggestion of embolic occlusion  PANCREAS:  Unremarkable  ADRENAL GLANDS: Unremarkable  KIDNEYS/URETERS:  No solid renal mass  No hydronephrosis  No urinary tract calculi  PELVIS REPRODUCTIVE ORGANS:  Unremarkable for patient's age  URINARY BLADDER:  Jacinto in place  Nondistended bladder  ADDITIONAL ABDOMINAL AND PELVIC STRUCTURES STOMACH AND BOWEL:  Unremarkable  ABDOMINOPELVIC CAVITY:   New small volume ascites ABDOMINAL WALL/INGUINAL REGIONS:  Diffuse subcutaneous edema OSSEOUS STRUCTURES:  No acute fracture or destructive osseous lesion     Diffuse subcutaneous edema in both legs  Impression: 1  Suspect interval occlusion of recently placed proximal right common iliac artery stent 2  No residual filling defect or dissection involving right common femoral artery  Diffusely small diseased right SFA and popliteal artery without focal flow-limiting stenosis identified  Patent recently placed SFA stent  3   Intact anterior tibial artery with stenotic and/or occluded dorsalis pedis artery  Posterior tibial artery occludes at the ankle  Patent peroneal artery without opacification of anterior and posterior communicating arteries  4   Near circumferentially calcified stenosis of proximal left common iliac artery    The lesion measures 30-40% in severity by reconstructions which are compromised by artifact and software limitations and therefore difficult to exclude a focal flow-limiting stenosis within this segment  5   No significant left common femoral stenosis  SFA is diffusely diseased  Newly placed proximal stent is patent but distal SFA stent is occluded  Popliteal artery is diffusely stenotic  6   Left popliteal artery is diffusely stenotic which seems new from the prior arteriogram and may be related to acute stent occlusion  Left anterior tibial artery is intact  Posterior tibial artery occludes at the calcaneus  7   Small bilateral pleural effusions 8  Small volume ascites new from prior CT with diffuse subcutaneous edema Workstation performed: [id number]     CTA ABDOMEN W RUN OFF W WO CONTRAST    Result Date: 11/2/2022  Narrative: CT ANGIOGRAM OF THE AORTA AND LOWER EXTREMITIES WITH IV CONTRAST INDICATION:  Recent left lower extremity intervention with new decrease in ankle-brachial indices in the right lower extremity  COMPARISON: CTA, dated 10/27/2022  Fluoroscopy, dated 10/31/2022  TECHNIQUE:  CT angiogram examination of the abdomen, pelvis, and lower extremities was performed according to standard protocol with intravenous contrast   This examination, like all CT scans performed in the [facility], was performed utilizing techniques to minimize radiation dose exposure, including the use of iterative reconstruction and automated exposure control  Rad dose 2576 mGy-cm IV Contrast:  130 mL of iohexol (OMNIPAQUE)  FINDINGS: VASCULAR STRUCTURES:   ABDOMINAL VASCULAR STRUCTURES:   AORTA: The abdominal aorta is normal in caliber  There are moderate atherosclerotic calcifications  CELIAC ARTERY: The origin is normal in caliber  Branching anatomy is conventional   There is no atherosclerotic disease  SUPERIOR MESENTERIC ARTERY: Normal caliber, without atherosclerotic calcifications  INFERIOR MESENTERIC ARTERY: The ostia and visualized branches are normal  RIGHT RENAL ARTERY: The single renal artery is normal in caliber   LEFT RENAL ARTERY: The single renal artery has mild atherosclerotic calcifications at the origin  LEFT LOWER EXTREMITY: LEFT COMMON ILIAC ARTERY: Mild calcific atherosclerotic disease  The vessel is normal in caliber  LEFT INTERNAL ILIAC ARTERY: Mild atherosclerotic disease  Visualized branches are normal in caliber  LEFT EXTERNAL ILIAC ARTERY: Normal in caliber, without atherosclerotic disease  LEFT COMMON FEMORAL ARTERY: Mild calcific atherosclerotic disease  The vessel is normal in caliber  LEFT PROFUNDA FEMORIS: Mild calcific atherosclerotic disease  The vessel is normal in caliber  LEFT SUPERFICIAL FEMORAL ARTERY: Interval stenting and lithotripsy of the proximal and distal left superficial femoral artery  Peak hardening artifact limits evaluation of the intraluminal stent  Remainder of the superficial femoral artery is normal  LEFT POPLITEAL ARTERY: Moderate calcific atherosclerotic disease  The vessel is normal in caliber  LEFT TIBIOPERONEAL TRUNK: Mild calcific atherosclerotic disease  The vessel is normal in caliber  LEFT ANTERIOR TIBIAL ARTERY: Mild calcific atherosclerotic disease  The vessel is normal in caliber  LEFT PERONEAL ARTERY: Mild calcific atherosclerotic disease  The vessel is normal in caliber  LEFT POSTERIOR TIBIAL ARTERY: Mild calcific atherosclerotic disease  The vessel is normal in caliber  LEFT FOOT: Visualized branches are normal  RIGHT LOWER EXTREMITY: RIGHT COMMON ILIAC ARTERY: Interval stenting of the right common iliac artery  Beam hardening artifact limits evaluation of the intraluminal stent  RIGHT INTERNAL ILIAC ARTERY: Mild atherosclerotic disease  Visualized branches are normal in caliber  RIGHT EXTERNAL ILIAC ARTERY: Normal in caliber, without atherosclerotic disease  RIGHT COMMON FEMORAL ARTERY: Interval development of a probable flow-limiting dissection flap at the prior arteriotomy site  Moderate calcific atherosclerotic disease  The vessel is normal in caliber  Atherosclerotic disease   RIGHT PROFUNDA FEMORIS: Normal in caliber, without atherosclerotic disease  RIGHT SUPERFICIAL FEMORAL ARTERY: Focal severe atherosclerotic narrowing at the adductor canal   Otherwise moderate atherosclerotic disease throughout the remainder of the vessel  RIGHT POPLITEAL ARTERY: Moderate calcific atherosclerotic disease  The vessel is normal in caliber  RIGHT TIBIOPERONEAL TRUNK: Mild calcific atherosclerotic disease  The vessel is normal in caliber  RIGHT ANTERIOR TIBIAL ARTERY: Mild calcific atherosclerotic disease  The vessel is normal in caliber  RIGHT PERONEAL ARTERY: Mild calcific atherosclerotic disease  The vessel is normal in caliber  RIGHT POSTERIOR TIBIAL ARTERY: Mild calcific atherosclerotic disease  The vessel is normal in caliber  RIGHT FOOT: Visualized branches are normal  VENOUS: The iliocaval system is normal in caliber, without collateralization  OTHER FINDINGS ABDOMEN LIVER/BILIARY TREE:  Unremarkable  GALLBLADDER:  No calcified gallstones  No pericholecystic inflammatory change  SPLEEN:  Unremarkable  Normal size  PANCREAS:  Unremarkable  ADRENAL GLANDS: Punctate calcification in the lateral christine of the left adrenal gland, benign  Right adrenal gland is normal  KIDNEYS/URETERS:  No solid renal mass  No hydronephrosis  No urinary tract calculi  PELVIS REPRODUCTIVE ORGANS:  Unremarkable for patient's age  URINARY BLADDER:  Small amount of air in the urinary bladder likely reflects prior instrumentation  ADDITIONAL ABDOMINAL AND PELVIC STRUCTURES STOMACH AND BOWEL:  Large bowel is normal   Small bowel is normal   Stomach is normal  ABDOMINOPELVIC CAVITY:   No pathologically enlarged mesenteric or retroperitoneal lymph nodes  No ascites or free intraperitoneal air  ABDOMINAL WALL/INGUINAL REGIONS:  Diffuse anasarca  OSSEOUS STRUCTURES:  No acute fracture or destructive osseous lesion  Impression: Vascular: Left lower extremity: Interval stenting and lithotripsy of the left superficial femoral artery   Right lower extremity: 1  Interval development of a probable flow-limiting dissection flap at the prior arteriotomy site in the right common femoral artery superimposed upon moderate to severe atherosclerotic disease  2   Focal severe atherosclerotic narrowing at the adductor canal with additional moderate atherosclerotic narrowing throughout the remainder of the right superficial femoral artery  The study was marked in [facility] for significant notification  Workstation performed: [id number]     CTA ABDOMEN W RUN OFF W WO CONTRAST    Result Date: 10/28/2022  Narrative: CT ANGIOGRAM OF THE AORTA AND LOWER EXTREMITIES WITH IV CONTRAST INDICATION:  Aortoiliac disease  Nonhealing left heel wound  COMPARISON: CT of the left lower extremity with contrast on 10/21/2022  CT of the abdomen pelvis with contrast on 2/20/2022  TECHNIQUE:  CT angiogram examination of the abdomen, pelvis, and lower extremities was performed according to standard protocol with intravenous contrast   This examination, like all CT scans performed in the [facility], was performed utilizing techniques to minimize radiation dose exposure, including the use of iterative reconstruction and automated exposure control  3D reconstructions were performed an independent workstation, and are supplied for review  Rad dose 2492 mGy-cm IV Contrast:  130 mL of iohexol (OMNIPAQUE)  FINDINGS: VASCULAR STRUCTURES:   Visualized thoracoabdominal aorta is patent  The infrarenal abdominal demonstrates mild stenosis, see series 2 image 48 where atherosclerotic disease encroaches upon the lumen, in this region the aortic diameter measures 8 mm  The celiac artery, SMA, AL and bilateral renal arteries are patent  Accessory renal arteries are present bilaterally  RIGHT: Common iliac artery: 50% stenosis proximally Internal iliac artery and branches: Patent  External iliac artery: Patent  Common femoral artery: 50% stenosis   Profunda femoral artery and branches: Patent  Superficial femoral artery: 5075% stenosis of the proximal several centimeters  The mid to distal SFA demonstrates segmental regions of up to 75% stenosis  Popliteal artery: Scattered stenoses of up to 50-75%  Patent three-vessel runoff extending into the right foot  LEFT: Common iliac artery: 50% stenosis proximally  Internal iliac artery and branches: Patent  External iliac artery: Patent  Profunda femoral artery and branches: Patent  Common femoral artery: No significant stenosis  Superficial femoral artery: The proximal 3rd of the vessel demonstrates up to 75% stenosis  The distal SFA demonstrates up to 75% stenosis as well  Popliteal artery: P2 segment greater than 75% stenosis  Patent three-vessel runoff extending into the left foot  OTHER FINDINGS ABDOMEN LOWER CHEST:  Not visualized  LIVER/BILIARY TREE: Partially visualized  Unremarkable  GALLBLADDER:  No calcified gallstones  No pericholecystic inflammatory change  SPLEEN:  Partially visualized  Unremarkable  Normal size  PANCREAS:  Unremarkable  ADRENAL GLANDS: Unchanged small left lateral limb calcification, unchanged  Right adrenal gland unremarkable  KIDNEYS/URETERS:  No solid renal mass  No hydronephrosis  No urinary tract calculi  Right lateral lower pole cyst  PELVIS REPRODUCTIVE ORGANS:  Unremarkable for patient's age  IUD  URINARY BLADDER:  Antidependent air, correlate for recent instrumentation ADDITIONAL ABDOMINAL AND PELVIC STRUCTURES STOMACH AND BOWEL:  Unremarkable  ABDOMINOPELVIC CAVITY:   No pathologically enlarged mesenteric or retroperitoneal lymph nodes  No ascites or free intraperitoneal air  ABDOMINAL WALL/INGUINAL REGIONS:  Mild anasarca  OSSEOUS STRUCTURES:  No acute fracture or destructive osseous lesion  Impression: 1  Moderate stenosis of the mid infrarenal abdominal aorta secondary to calcific atherosclerotic disease  2  LEFT: Up to 50% stenosis of the proximal common iliac artery    The proximal and distal 3rd of the superficial femoral artery demonstrates 50-75% stenosis  Popliteal artery demonstrates greater than 75% stenosis in the P2 segment  Patent three-vessel runoff extending into the left foot  3  RIGHT: Proximal common iliac artery stenosis of up to 50%  50% stenosis of the common femoral artery  Superficial femoral artery demonstrates up to 50% stenosis within the proximal several centimeters, 50-75% stenosis throughout the distal 3rd of the vessel  Popliteal artery demonstrates scattered stenoses of 50-75%  Patent three-vessel runoff extending into the right foot  Workstation performed: [id number]     MRI ankle/heel left  wo contrast    Result Date: 10/27/2022  Narrative: MRI ANKLE/HEEL LEFT WO CONTRAST INDICATION:   left heel eschar, unknown depth, rule out osteomyelitis  COMPARISON:  Left foot and ankle CT from 10/21/2022  TECHNIQUE:   MR sequences were obtained of the ankle including: Localizers, coronal STIR, coronal T1, axial T2 fat sat, axial PD, sagittal T2 fat sat, sagittal T1 sequences were obtained  Gadolinium was not used  FINDINGS: Exam moderately limited due to patient motion  Subcutaneous Tissues: There is a large plantar lateral heel ulcer, without underlying soft tissue abscess (series 5 image 5-15 ) Joint Effusion: None  TENDONS: Achilles tendon: Normal  Peroneus brevis and longus: Normal  Posterior tibialis, flexor digitorum longus, flexor hallucis longus: Normal  Anterior tibialis, extensor digitorum longus, extensor hallucis longus:  Normal  LIGAMENTS: Lateral collateral ligament complex:  Normal  Distal tibiofibular syndesmosis:  Normal  Medial collateral ligament complex:  Normal  Plantar Fascia:  Normal  Articular Surfaces:  Normal  Sinus Tarsi:  Normal  Tarsal Tunnel: Unremarkable   Bones:  Associated with the plantar lateral heel ulcer, there is only very minimal reactive marrow edema in the calcaneal tubercle evident on T2-weighted sequences (series 3 image 2, series 7 image 8 ) No confluent T1-weighted marrow abnormality or cortical destruction to suggest osteomyelitis  Musculature:  Normal      Impression: Exam moderately limited due to patient motion  There is a large plantar lateral heel ulcer, without underlying soft tissue abscess (series 5 image 5-15 ) Associated with this ulcer, there is only very minimal reactive marrow edema in the calcaneal tubercle evident on T2-weighted sequences (series 3 image 2, series 7 image 8 ) No confluent T1-weighted marrow abnormality or cortical destruction to suggest osteomyelitis  Workstation performed: [id number]     VAS lower limb arterial duplex, complete bilateral    Result Date: 11/9/2022  Narrative:  [facility] REPORT CLINICAL: Indications:  Evaluation s/p intervention  Bilateral lower extremity ulcerations  Operative History: 2022-11-07 Right CFA arterectomy w/  PTA 2022-11-07 Right Popliteal PTA and Posterior tibial PTA 2022-11-07 Right SFA PTA/ stent placement 2022-10-31 Abdominal aortogram 2022-10-31 Right ALLISON Stent 2022-10-31 Right PTA stent 2022-10-31 Left SFA shockwave angioplasty Risk Factors The patient has history of Obesity, HTN, Diabetes, CVA, Hyperlipidemia and previous smoking   Clinical Right Pressure:  188/ mm Hg, Left Pressure: IV  FINDINGS:  Segment                Right            Left                                          PSV (cm/s)  EDV  Impression  PSV (cm/s)  EDV  Common Femoral Artery          81   17                     145   20  Prox Profunda                 174   16                     230   12  Prox SFA                      143   14                      89    8  Mid SFA                        90    9  Occluded             0    0  Dist SFA                       70    8  Occluded             0    0  Proximal Pop                   41   11                      27   11  Distal Pop                     50    5                      20    7  Tibioperoneal                  40                           39 Dist Post Tibial                8    0                       0    0  Dist  Ant  Tibial              21    0                       8    3     CONCLUSION: Impression: RIGHT LOWER LIMB: Monophasic waveforms in the common femoral artery suggesting more proximal disease with diffuse disease throughout the remaining portions of the femoral-popliteal arteries without significant focal stenosis  Findings suggests tibioperoneal disease  Ankle/Brachial index: unobtainable secondary to indistinguishable Doppler signal, previous study 0 51  PVR tracing at the ankle is dampened  The PVR tracing at the metatarsal level and PPG waveform of the great toe are flat lined, suggesting poor distal perfusion, therefore, pressures are unobtainable  LEFT LOWER LIMB: An occlusion of the mid superficial femoral artery with minimal distal reconstitution  Findings suggests tibioperoneal disease  Ankle/Brachial index: unobtainable secondary to indistinguishable Doppler signal, previous study 0 74  The PVR tracings at the ankle and metatarsal levels are flat lined with the PPG waveform of the great toe is flat lined, suggesting poor distal perfusion, therefore, pressures are unobtainable  In comparison to the studies dated 10/24/2022 and 11/1/2022, there is progression in the disease process bilaterally  SIGNATURE: Electronically Signed by: Nikolas Penaloza on 2022-11-09 12:42:16 PM    VAS lower limb arterial duplex, complete bilateral    Result Date: 10/24/2022  Narrative:  [facility] REPORT CLINICAL: Indications:  Left heel ulceration  Risk Factors: Obesity, HTN, Diabetes, CVA, Hyperlipidemia and previous smoking  Clinical Right Pressure:  158/ mm Hg, Left Pressure:  168/ mm Hg    FINDINGS:  Segment                Right       Left                                          PSV (cm/s)  PSV (cm/s)  Common Femoral Artery          72         103  Prox Profunda                  92         164  Prox SFA                       86 45  Mid SFA                        93          60  Dist SFA                       57          97  Proximal Pop                   83          63  Distal Pop                    111          79  Tibioperoneal                 100          96  Dist Post Tibial               38           7  Dist  Ant  Tibial              36          46     CONCLUSION: Impression: RIGHT LOWER LIMB: Monophasic waveforms in the common femoral artery suggesting more proximal disease  Diffuse atherosclerotic disease throughout the femoral-popliteal arteries without focal stenosis  Findings suggests tibioperoneal disease  Ankle/Brachial index:  0 80, moderate claudication range  PVR/ PPG tracings are dampened  Metatarsal pressure of 64 mm Hg Great toe pressure of 61 mm Hg, above healing threshold for a diabetic  LEFT LOWER LIMB: Diffuse atherosclerotic disease throughout the femoral-popliteal arteries without focal stenosis  Findings suggests tibioperoneal disease  Ankle/Brachial index:  0 63, severe claudication range  PVR/ PPG tracings are dampened  Metatarsal pressure of 21 mm Hg Great toe pressure of 35 mm Hg, below healing threshold for a diabetic  SIGNATURE: Electronically Signed by: Cesia Golden on 2022-10-24 02:00:50 PM    VAS lower limb arterial duplex, limited, unilateral    Result Date: 11/2/2022  Narrative:  [facility]: Indications:  Evaluated the right CFA and ilaic due to SHARIF decrease after angio/ stent  Operative History: 2022-10-31 Abdominal aortogram 2022-10-31 Right ALLISON Stent 2022-10-31 Right PTA stent 2022-10-31 Left SFA shockwave angioplasty Risk Factors The patient has history of Obesity, HTN, Diabetes (IDDM), Hyperlipidemia and previous smoking (quit >10yrs ago)    FINDINGS:  Right                  PSV (cm/s)  Dist EIA                      682  Common Femoral Artery         825  Prox SFA                      391     CONCLUSION:   Impression: There is a high grade stenosis in the right common femoral artery / proximal femoral artery  SIGNATURE: Electronically Signed by: Zuleyma Hong on 2022-11-02 04:58:20 PM    VAS SHARIF & waveform analysis, multiple levels    Result Date: 11/1/2022  Narrative:  [facility]PORT [facility]: Indications:  SHARIF follow post-op aortogram, LLE run off, PTA stent  Operative History: 2022-10-31 Abdominal aortogram 2022-10-31 Left ALLISON Stent 2022-10-31 Left PTA stent 2022-10-31 Left SFA shockwave angioplasty Risk Factors The patient has history of Obesity, HTN, Diabetes (IDDM), Hyperlipidemia and previous smoking (quit >10yrs ago)  Clinical Right Pressure:  138/ mm Hg, Left Pressure:  158/ mm Hg  FINDINGS:  Segment       Ri  Left                         P    P  Peroneal      76   91  Post  Tibial  81  117  Ankle         81  117  Metatarsal    45   85  Great Toe     50   37     CONCLUSION:  Impression RIGHT LOWER LIMB Ankle/Brachial Index:0 51 wiyh in severe limits (prior 0 63) PPG/PVR Tracings are dampened  Metatarsal Pressure 45 mmHg Great Toe Pressure: 50 mmHg, within the healing range  LEFT LOWER LIMB Ankle/Brachial Index: 0 74 within moderate limits (prior 0 63) PPG/PVR Tracings are dampened  Metatarsal Pressure 85 mmHg Great Toe Pressure: 37 mmHg, below the healing range  SIGNATURE: Electronically Signed by: Zuleyma Hong on 2022-11-01 03:41:46 PM    VAS lower limb vein mapping bypass graft    Result Date: 11/3/2022  Narrative:  [facility]: Indications: Patient presents for a general health evaluation secondary to future open heart surgery  Patient is asymptomatic at this time  Operative History: 2022-10-31 Abdominal aortogram 2022-10-31 Right ALLISON Stent 2022-10-31 Right PTA stent 2022-10-31 Left SFA shockwave angioplasty Risk Factors The patient has history of Obesity, HTN, Diabetes (IDDM), Hyperlipidemia and previous smoking (quit >10yrs ago)    FINDINGS:  Segment         Right        Left Diameter AP  Diameter AP  GSV Inguinal            9 8          6 1  GSV Prox Thigh          3 5          4 9  GSV Mid Thigh           3 2          3 0  GSV Dist Thigh          3 0          4 7  GSV Knee                2 9          3 3  GSV Prox Calf           3 4          3 3  GSV Mid Calf            3 4          3 7  GSV Dist Calf           3 5          3 3  SSV Mid Calf            2 5          2 0  SSV Knee                1 9          2 1  SSV Ankle               2 5          2 4     CONCLUSION:  Impression: RIGHT LOWER LIMB: The great saphenous vein is patent  from the groin to the ankle  The vein measures >2mm in caliber from the groin to the distal calf  LEFT LOWER LIMB: The great saphenous vein is patent  from the groin to the ankle  The vein measures >2mm in caliber from the groin to the distal calf  SIGNATURE: Electronically Signed by: Estrella Clay on 2022-11-03 09:45:42 AM    IR aortagram with run-off    Result Date: 11/1/2022  Narrative: PROCEDURE: Ultrasound-guided access of the right common femoral artery  Right common femoral artery sheath arteriogram  Aortogram  Left lower extremity runoff arteriogram  Shockwave balloon lithotripsy of proximal SFA  Proximal SFA Rosalinda stent placement  Shockwave balloon lithotripsy of the distal SFA  Distal SFA Rosalinda stent placement  Postintervention SFA and lower leg arteriograms  Right common iliac artery arteriogram  Right common iliac artery Express stent placement  Post stent placement RCIA arteriogram  Successful right common femoral artery closure with a ProGlide device  STAFF: KELLY Mahajan  CONTRAST: 180 mL Visapaque FLUOROSCOPY TIME: 34 8 minutes  NUMBER OF IMAGES: Multiple COMPLICATIONS: None  MEDICATIONS: Local lidocaine  Heparin: 6000 units Moderate sedation with fentanyl and Versed was monitored by radiology nursing staff  Monitoring of conscious sedation totaled 180 minutes  INDICATION: Nonhealing left foot /heel wounds   CTA and arterial duplex ultrasound with evidence of significant peripheral vascular disease  PROCEDURE: The patient was placed supine on the procedure table  The right groin was prepped and draped in a sterile manner  Timeout was performed  Ultrasound-guided access of the right common femoral artery was obtained with a micropuncture needle  The access was upsized and a 5 [employer] sheath was placed  Access arteriogram was used to confirm that the sheath was not occlusive  Using a SOS flush catheter,  access to the contralateral SFA was obtained with a [employer] wire, over which a 6 [employer] [place] by 65 cm destination sheath was placed within the left distal external iliac artery  A limited arteriogram was performed through the sheath at the aortic bifurcation, to confirm that the sheath was not occlusive within the known right common iliac artery proximal lesion  Left lower leg runoff arteriogram was performed, demonstrating a proximal SFA focal severe stenosis secondary to eccentric calcific atherosclerotic disease  A similar lesion was identified near the region of the abductor canal  The remainder of the SFA, popliteal artery are patent without significant stenosis  2 vessel runoff via the anterior tibial and peroneal arteries  A 0 014 wire was then advanced across the proximal SFA lesion  Shockwave balloon lithotripsy was performed for several rounds with a 5 mm x 6 cm Shockwave balloon  Postintervention arteriogram revealed no significant change within the focal eccentric plaque  A 6 mm x 6 cm Rosalinda stent was placed across this region, post dilated with a 5 mm  balloon  At completion no significant residual stenosis  The wire was then advanced across the distal SFA lesion  Shockwave balloon lithotripsy was performed for several rounds as above  This resulted in irregular dissection proximal to the lesion  The dissection as well as residual stenosis was stented with 6mm x 10cm Rosalinda stent postdilated with a 5 mm  balloon  Postintervention arteriogram with no significant residual stenosis  Completion left lower leg and foot arteriograms were performed, demonstrating preserved 2 vessel runoff as described above, with qualitatively increased rate of flow to the foot  The sheath was then retracted to the proximal right common iliac artery  Arteriogram was performed demonstrating up to 75% stenosis secondary to a focal region of eccentric calcific plaque  This was stented with a 7 mm x 17mm Express Stent  Post intervention arteriogram without residual stenosis  Successful access closure with a Proglide device  FINDINGS: 1  Ultrasound demonstrated the right common femoral artery to the patent  2  Left lower leg runoff arteriogram demonstrated a proximal SFA focal severe stenosis secondary to eccentric calcific atherosclerotic disease  A similar lesion was identified near the region of the abductor canal  The remainder of the SFA, popliteal artery are patent without significant stenosis  2 vessel runoff via the anterior tibial and peroneal arteries  Successful SFA shockwave lithotripsy and Rosalinda stent placement x 2 as described above  3  Mid right common iliac artery severe focal stenosis secondary to eccentric calcific plaque  Successful Express stent placement as described above  4  Preserved two vessel runoff on the left via the anterior tibial and peroneal arteries with qualitatively increased rate of flow compared to the preintervention arteriogram      Impression: Left lower leg arteriogram with proximal and distal SFA shockwave balloon lithotripsy and stent placement, as well as right common iliac artery stent placement  PLAN: Follow-up arterial duplex  Patient became short of breath after the procedure, with increased O2 requirement  Etiology most likely volume overload secondary to renal prep as well as contrast and fluid was given during the procedure where she was lying flat  Dr Keyana De from the primary team made aware   Chest x-ray to be obtained upon arrival to the floor  Workstation performed: [id number]     XR chest portable ICU    Result Date: 11/9/2022  Narrative: CHEST INDICATION:   Increased o2 requirement  COMPARISON:  10/31/2022 EXAM PERFORMED/VIEWS:  XR CHEST PORTABLE ICU FINDINGS:  Patient rotation limits evaluation of the cardiomediastinal structures  Grossly stable cardiomediastinal structures  Central pulmonary vascular congestion with perihilar and basilar interstitial edema  No large effusions  No pneumothorax  No acute osseous abnormality  Stable bony structures  Impression: Moderate pulmonary edema, similar to the prior examination  No large effusions  Workstation performed: [id number]     CT lower extremity w contrast left    Result Date: 10/22/2022  Narrative: CT left lower extremity with IV contrast INDICATION: L foot necrosis  COMPARISON: None TECHNIQUE: CT examination of the above was performed  This examination, like all CT scans performed in the [facility], was performed utilizing techniques to minimize radiation dose exposure, including the use of iterative reconstruction  and automated exposure control software  Sagittal and coronal two dimensional reconstructed images were also submitted for interpretation  IV Contrast: 100 mL of iohexol (OMNIPAQUE) Rad dose  592 mGy-cm FINDINGS: OSSEOUS STRUCTURES:  No acute fracture, dislocation or destructive osseous lesion  Mild plantar calcaneal spurring  Mild degenerative changes of the ankle joint VISUALIZED MUSCULATURE:  Unremarkable  SOFT TISSUES:  Atherosclerosis  Moderate superficial soft tissue edema in the foot, ankle, and leg, superimposed cellulitis considered in the appropriate clinical setting  No discrete drainable collection identified  OTHER PERTINENT FINDINGS:  Enthesopathy of the anterior patella along the extensor mechanism attachment  No discrete subcutaneous gas is identified     Impression: No acute bony process is seen  Moderate superficial soft tissue edema in the foot, ankle, and leg, superimposed cellulitis considered in the appropriate clinical setting  Correlation with patient's symptoms and laboratory values recommended  No discrete drainable collection identified  Other findings as above  Workstation performed: [id number]     MRI follow up msk    Result Date: 10/28/2022  Narrative: MRI LEFT FOOT INDICATION:   Inpatient Order left heel eschar, unknown depth, rule out osteomyelitis  COMPARISON: Correlation is made with the prior CT study dated 10/21/2022  TECHNIQUE:  MR sequences were obtained of the left foot including the following:  Localizer, no further images were obtained as the patient could not tolerate the examination due to pain  Imaging performed on 1 5T MRI Gadolinium was not used FINDINGS: The study is nondiagnostic is only localizer images were obtained  The patient could not continue the examination due to underlying pain  Impression: Nondiagnostic study  Workstation performed: [id number]     Echo complete w/ contrast if indicated    Result Date: 10/30/2022  Narrative: •  Left Ventricle: Left ventricular cavity size is normal  There is mild to moderate concentric hypertrophy  The left ventricular ejection fraction is 60% by visual estimation  Systolic function is normal  Wall motion is normal  Diastolic function is normal  •  Right Ventricle: Right ventricular cavity size is normal  Systolic function is normal  •  Aortic Valve: There is aortic sclerosis without clear visualization of the valve leaflets  •  Mitral Valve: There is mild annular calcification  There is trace regurgitation  •  Tricuspid Valve: There is trace regurgitation  Pulmonary artery systolic pressures are estimated at 31 mm Hg  •  There is no study for comparison       Echo follow up/limited w/ contrast if indicated    Result Date: 11/14/2022  Narrative: •  Left Ventricle: Left ventricular cavity size is normal  Wall thickness is normal  The left ventricular ejection fraction is 61%  Systolic function is normal  Wall motion is normal  Diastolic function is normal  •  Mitral Valve: There is mild annular calcification  Compared to prior echocardiographic study dated 10/30/2022, there is no significant change  Labs and pertinent reports reviewed  This note has been generated by voice recognition software system  Therefore, there may be spelling, grammar, and or syntax errors  Please contact if questions arise

## 2022-12-13 NOTE — ASSESSMENT & PLAN NOTE
· Noted to have change in mental status on 11/13/2022  · CT confirmed moderate right parietal lobe stroke, likely embolic in the setting of HIT with thrombosis  · She was treated with argatroban and currently on Coumadin only

## 2022-12-13 NOTE — ASSESSMENT & PLAN NOTE
· Positive heparin antibody on 11/11/2022  · Completed 5 days of overlap with argatroban and Coumadin   · INR supratherapeutic  · Coumadin dose managed by primary service

## 2022-12-13 NOTE — ASSESSMENT & PLAN NOTE
65year old female former smoker w/HTN, HLD, uncontrolled type II DM (A1c 9 8), hx CVA, presenting with nonhealing extensive L heel ulceration and R hallux and 5th digit ulceration  Vascular surgery consulted for input  S/p multiple b/l endovascular interventions  JUSTIN  R hemispheric CVA    S/p L AKA 11/23/22 [facility])  S/p R AKA, wound debridement 12/1/22 [facility])      Recommendations:  - Bilateral tissue loss in the setting of uncontrolled type II DM and NYDIA on admission, now s/p multiple angiograms w/intervention    - Endovascular interventions, c/b atheroembolic event to the RLE and JUSTIN with R hemispheric CVA  - Now s/p L AKA on 11/23/22 and R AKA 12/1/22 w/ proximal thigh wound debridements  - Plastic surgery input appreciated  Recommending daily santyl to R thigh wounds  Wounds will require debridement  Plan for OR today with Dr Patrick Mac  - On coumadin, noted to be subtherapeutic today, however will adjust coumadin dose accordingly  D/w pharmacy, will give 4mg coumadin tonight and recheck INR tomorrow AM  - Medical management with statin, Plavix  - Medical management and glucose control per SLIM    - Continue oral pain regimen, adjusted to long acting which appears to be controlling patient's pain well   - Appreciate psych input and management - addition of wellbutrin and adjustment of zoloft   - D/w Dr Patrick Mac

## 2022-12-14 ENCOUNTER — ANESTHESIA EVENT (INPATIENT)
Dept: PERIOP | Facility: HOSPITAL | Age: 62
End: 2022-12-14

## 2022-12-14 LAB
ERYTHROCYTE [DISTWIDTH] IN BLOOD BY AUTOMATED COUNT: 17.5 % (ref 11.6–15.1)
GLUCOSE SERPL-MCNC: 136 MG/DL (ref 65–140)
GLUCOSE SERPL-MCNC: 161 MG/DL (ref 65–140)
GLUCOSE SERPL-MCNC: 172 MG/DL (ref 65–140)
GLUCOSE SERPL-MCNC: 189 MG/DL (ref 65–140)
HCT VFR BLD AUTO: 22.9 % (ref 34.8–46.1)
HCT VFR BLD AUTO: 24.7 % (ref 34.8–46.1)
HGB BLD-MCNC: 6.9 G/DL (ref 11.5–15.4)
HGB BLD-MCNC: 7.3 G/DL (ref 11.5–15.4)
INR PPP: 4.08 (ref 0.84–1.19)
INR PPP: 8.47 (ref 0.84–1.19)
MCH RBC QN AUTO: 26 PG (ref 26.8–34.3)
MCHC RBC AUTO-ENTMCNC: 30.1 G/DL (ref 31.4–37.4)
MCV RBC AUTO: 86 FL (ref 82–98)
PLATELET # BLD AUTO: 329 THOUSANDS/UL (ref 149–390)
PMV BLD AUTO: 8.1 FL (ref 8.9–12.7)
PROTHROMBIN TIME: 39.2 SECONDS (ref 11.6–14.5)
PROTHROMBIN TIME: 69.2 SECONDS (ref 11.6–14.5)
RBC # BLD AUTO: 2.65 MILLION/UL (ref 3.81–5.12)
WBC # BLD AUTO: 10.76 THOUSAND/UL (ref 4.31–10.16)

## 2022-12-14 PROCEDURE — 97110 THERAPEUTIC EXERCISES: CPT

## 2022-12-14 PROCEDURE — 97530 THERAPEUTIC ACTIVITIES: CPT

## 2022-12-14 PROCEDURE — 85014 HEMATOCRIT: CPT | Performed by: PHYSICIAN ASSISTANT

## 2022-12-14 PROCEDURE — 85027 COMPLETE CBC AUTOMATED: CPT | Performed by: SURGERY

## 2022-12-14 PROCEDURE — NC001 PR NO CHARGE: Performed by: SURGERY

## 2022-12-14 PROCEDURE — 85018 HEMOGLOBIN: CPT | Performed by: PHYSICIAN ASSISTANT

## 2022-12-14 PROCEDURE — 82948 REAGENT STRIP/BLOOD GLUCOSE: CPT

## 2022-12-14 PROCEDURE — 85610 PROTHROMBIN TIME: CPT | Performed by: PHYSICIAN ASSISTANT

## 2022-12-14 PROCEDURE — 99232 SBSQ HOSP IP/OBS MODERATE 35: CPT | Performed by: INTERNAL MEDICINE

## 2022-12-14 PROCEDURE — 97535 SELF CARE MNGMENT TRAINING: CPT

## 2022-12-14 RX ADMIN — METOPROLOL SUCCINATE 25 MG: 25 TABLET, EXTENDED RELEASE ORAL at 08:46

## 2022-12-14 RX ADMIN — METHOCARBAMOL 1000 MG: 500 TABLET ORAL at 05:21

## 2022-12-14 RX ADMIN — CEFAZOLIN SODIUM 2000 MG: 2 SOLUTION INTRAVENOUS at 17:36

## 2022-12-14 RX ADMIN — CLOPIDOGREL BISULFATE 75 MG: 75 TABLET ORAL at 08:46

## 2022-12-14 RX ADMIN — AMLODIPINE BESYLATE 10 MG: 10 TABLET ORAL at 08:46

## 2022-12-14 RX ADMIN — DOXAZOSIN 4 MG: 4 TABLET ORAL at 21:49

## 2022-12-14 RX ADMIN — SERTRALINE HYDROCHLORIDE 175 MG: 100 TABLET ORAL at 08:46

## 2022-12-14 RX ADMIN — LISINOPRIL 40 MG: 20 TABLET ORAL at 08:46

## 2022-12-14 RX ADMIN — QUETIAPINE FUMARATE 25 MG: 25 TABLET ORAL at 21:49

## 2022-12-14 RX ADMIN — OXYCODONE HYDROCHLORIDE 10 MG: 10 TABLET, FILM COATED, EXTENDED RELEASE ORAL at 21:49

## 2022-12-14 RX ADMIN — CEFAZOLIN SODIUM 2000 MG: 2 SOLUTION INTRAVENOUS at 09:57

## 2022-12-14 RX ADMIN — NYSTATIN: 100000 POWDER TOPICAL at 17:36

## 2022-12-14 RX ADMIN — NYSTATIN: 100000 POWDER TOPICAL at 08:47

## 2022-12-14 RX ADMIN — PANTOPRAZOLE SODIUM 40 MG: 40 TABLET, DELAYED RELEASE ORAL at 05:21

## 2022-12-14 RX ADMIN — CEFAZOLIN SODIUM 2000 MG: 2 SOLUTION INTRAVENOUS at 01:32

## 2022-12-14 RX ADMIN — METHOCARBAMOL 1000 MG: 500 TABLET ORAL at 13:59

## 2022-12-14 RX ADMIN — GABAPENTIN 400 MG: 400 CAPSULE ORAL at 21:49

## 2022-12-14 RX ADMIN — INSULIN LISPRO 1 UNITS: 100 INJECTION, SOLUTION INTRAVENOUS; SUBCUTANEOUS at 08:49

## 2022-12-14 RX ADMIN — BUPROPION HYDROCHLORIDE 150 MG: 150 TABLET, FILM COATED, EXTENDED RELEASE ORAL at 08:46

## 2022-12-14 RX ADMIN — ATORVASTATIN CALCIUM 40 MG: 40 TABLET, FILM COATED ORAL at 16:00

## 2022-12-14 RX ADMIN — INSULIN GLARGINE 8 UNITS: 100 INJECTION, SOLUTION SUBCUTANEOUS at 21:49

## 2022-12-14 RX ADMIN — GABAPENTIN 400 MG: 400 CAPSULE ORAL at 16:00

## 2022-12-14 RX ADMIN — OXYCODONE 7.5 MG: 5 TABLET ORAL at 11:09

## 2022-12-14 RX ADMIN — COLLAGENASE SANTYL: 250 OINTMENT TOPICAL at 08:47

## 2022-12-14 RX ADMIN — OXYCODONE HYDROCHLORIDE 10 MG: 10 TABLET, FILM COATED, EXTENDED RELEASE ORAL at 08:46

## 2022-12-14 RX ADMIN — INSULIN LISPRO 1 UNITS: 100 INJECTION, SOLUTION INTRAVENOUS; SUBCUTANEOUS at 11:52

## 2022-12-14 RX ADMIN — METHOCARBAMOL 1000 MG: 500 TABLET ORAL at 21:49

## 2022-12-14 RX ADMIN — GABAPENTIN 400 MG: 400 CAPSULE ORAL at 08:46

## 2022-12-14 NOTE — PROGRESS NOTES
22 1200   Clinical Encounter Type   Visited With Patient   Routine Visit Introduction      Pastoral Care Progress Note    2022  [id number] 1960  Admission Date & Time: 10/21/2022 1958  MRN: [id number] CSN: [id number]          Volunteer  introductory visit  If any other requests arise, please contact [facility] care

## 2022-12-14 NOTE — OCCUPATIONAL THERAPY NOTE
Occupational Therapy Progress Note     Patient Name: Tori Giles  PSYNW'I Date: 12/14/2022  Problem List  Principal Problem:    PAD (peripheral artery disease) ([facility])  Active Problems:    Depression, recurrent (HCC)    Benign essential hypertension    Stroke ([facility] )    Type 2 diabetes mellitus with hyperglycemia, with long-term current use of insulin ([facility])    Hypokalemia    Acute on chronic anemia    Diabetic ulcer of heel associated with diabetes mellitus due to underlying condition ([facility])    HIT (heparin-induced thrombocytopenia)    Febrile    Diarrhea    Mild protein[facility]calorie malnutrition [facility]        12/14/22 1419   OT Last Visit   OT Visit Date 12/14/22   Note Type   Note Type Treatment   Pain Assessment   Pain Assessment Tool FLACC   Pain Location/Orientation Orientation: Bilateral;Location: Leg   Pain Rating: FLACC (Rest) - Face 0   Pain Rating: FLACC (Rest) - Legs 0   Pain Rating: FLACC (Rest) - Activity 0   Pain Rating: FLACC (Rest) - Cry 0   Pain Rating: FLACC (Rest) - Consolability 0   Score: FLACC (Rest) 0   Pain Rating: FLACC (Activity) - Face 1   Pain Rating: FLACC (Activity) - Legs 0   Pain Rating: FLACC (Activity) - Activity 0   Pain Rating: FLACC (Activity) - Cry 1   Pain Rating: FLACC (Activity) - Consolability 1   Score: FLACC (Activity) 3   Restrictions/Precautions   Weight Bearing Precautions Per Order Yes   RLE Weight Bearing Per Order NWB  (AKA)   LLE Weight Bearing Per Order NWB  (AKA)   Other Precautions Chair Alarm; Bed Alarm; Fall Risk;Pain   ADL   Where Assessed Supine, bed   Toileting Assistance  1  Total Assistance   Toileting Deficit Setup;Steadying;Supervison/safety;Verbal cueing; Increased time to complete;Clothing management up;Clothing management down;Perineal hygiene; Bedside commode   Functional Standing Tolerance   Time 5 min   Activity Long sitting   Comments min-maxA for balance   Bed Mobility   Rolling R 3  Moderate assistance   Additional items Assist x 2; Increased time required;Verbal cues; Bedrails   Rolling L 2  Maximal assistance   Additional items Assist x 2;Bedrails; Increased time required;Verbal cues;LE management   Supine to Sit Unable to assess   Transfers   Sit to Stand Unable to assess   Additional Comments Continue use of andree for OOB   Therapeutic Exercise - ROM   UE-ROM Yes   ROM- Right Upper Extremities   R Shoulder AROM; Flexion; Extension;Horizontal ABduction;ABduction; External rotation; Internal rotation   R Elbow AROM;Elbow flexion;Elbow extension   R Weight/Reps/Sets 15x2 sets   ROM - Left Upper Extremities    L Shoulder AROM; Flexion; Extension;Horizontal ABduction;ABduction; External rotation; Internal rotation   L Elbow AROM;Elbow flexion;Elbow extension   L Weight/Reps/Sets 15x2 sets   LUE ROM Comment Requires cuing for L side; some inattention noted   Subjective   Subjective "I'll see what I can do today"   Cognition   Overall Cognitive Status WFL   Arousal/Participation Alert; Responsive; Cooperative   Attention Within functional limits   Orientation Level Oriented X4   Memory Decreased recall of precautions   Following Commands Follows one step commands with increased time or repetition   Activity Tolerance   Activity Tolerance Patient limited by fatigue;Patient limited by pain   Medical Staff Made Aware RN, PT   Assessment   Assessment Patient participated in skilled OT session this date with interventions consisting of therapeutic activities, therapeutic exercises, neuro-reeducation, and self-care/ADL training to increase B UE strength, functional endurance/activity tolerance, static/dynamic sitting/standing balance, and overall safety awareness to increase (I) with ADLs/IADLs to return to PLOF  Provided education on HEP, energy conservation techniques, deep breathing techniques, fall prevention strategies, and overall safety awareness  Patient agreeable to OT treatment session, upon arrival patient was found supine in bed/seated OOB to Recliner   Patient requiring verbal cues for safety and verbal cues for pacing through activity steps  Please refer to flowsheet for functional performance  Therex completed in order to increase strength/endurance for bed mobility, transfers and ADL tasks; incoordination and L inattention noted with L UE during session, requires frequent cues for L UE during therex and difficulty grasping bedrail; some dysmetria noted  Resting tremors also noted  Pt able to long sit with min-maxA for balance for 5 min with B UE support, leaning to R side  She remains motivated to participate in therapy  Patient continues to be functioning below baseline level, occupational performance remains limited secondary to factors listed above and increased risk for falls and injury  Pt left seated in chair/supine in bed, alarm armed and call bell and bedside table within reach; all needs met  The patient's raw score on the AM-PAC Daily Activity inpatient short form is 12, standardized score is 30 6, less than 39 4  Patients at this level are likely to benefit from discharge to post-acute rehabilitation services  Please refer to the recommendation of the Occupational Therapist for safe discharge planning  Plan   Treatment Interventions ADL retraining;Functional transfer training;UE strengthening/ROM; Endurance training;Cognitive reorientation;Patient/family training;Equipment evaluation/education; Compensatory technique education   Goal Expiration Date 12/14/22   OT Treatment Day 14   OT Frequency 2-3x/wk   Recommendation   OT Discharge Recommendation Post acute rehabilitation services   [facility] Daily Activity Inpatient   Lower Body Dressing 1   Bathing 2   Toileting 1   Upper Body Dressing 2   Grooming 3   Eating 3   Daily Activity Raw Score 12   Daily Activity Standardized Score (Calc for Raw Score >=11) 30 6   AM-[facility] Applied Cognition Inpatient   Following a Speech/Presentation 4   Understanding Ordinary Conversation 4   Taking Medications 3   Remembering Where Things Are Placed or Put Away 4   Remembering List of 4-5 Errands 3   Taking Care of Complicated Tasks 2   Applied Cognition Raw Score 20   Applied Cognition Standardized Score 41 76     Minnie Ardon

## 2022-12-14 NOTE — ASSESSMENT & PLAN NOTE
Continue amlodipine 10 mg daily, doxazosin 4 mg at bedtime, metoprolol 25 mg daily, lisinopril 40 mg daily with hold parameters

## 2022-12-14 NOTE — PHYSICAL THERAPY NOTE
PHYSICAL THERAPY NOTE          Patient Name: Rowdy Contreras  QVVPF'D Date: 12/14/2022 12/14/22 1456   PT Last Visit   PT Visit Date 12/14/22   Note Type   Note Type Treatment   Pain Assessment   Pain Assessment Tool FLACC   Pain Rating: FLACC (Rest) - Face 0   Pain Rating: FLACC (Rest) - Legs 0   Pain Rating: FLACC (Rest) - Activity 0   Pain Rating: FLACC (Rest) - Cry 0   Pain Rating: FLACC (Rest) - Consolability 0   Score: FLACC (Rest) 0   Pain Rating: FLACC (Activity) - Face 1   Pain Rating: FLACC (Activity) - Legs 0   Pain Rating: FLACC (Activity) - Activity 0   Pain Rating: FLACC (Activity) - Cry 1   Pain Rating: FLACC (Activity) - Consolability 1   Score: FLACC (Activity) 3   Restrictions/Precautions   Weight Bearing Precautions Per Order Yes   RLE Weight Bearing Per Order NWB   LLE Weight Bearing Per Order NWB   Other Precautions Bed Alarm; Fall Risk;Pain   General   Chart Reviewed Yes   Response to Previous Treatment Patient with no complaints from previous session  Family/Caregiver Present No   Cognition   Overall Cognitive Status WFL   Arousal/Participation Alert; Cooperative   Attention Within functional limits   Orientation Level Oriented X4   Memory Decreased recall of precautions   Following Commands Follows one step commands with increased time or repetition   Comments Decreased recall of amputation precautions/ positioning   Subjective   Subjective Pt agreeable to PT treatment   Bed Mobility   Rolling R 2  Maximal assistance   Additional items Assist x 2; Increased time required; Bedrails;Verbal cues  (Pt with poor tolerance of pressure on R residual limb wounds)   Rolling L 3  Moderate assistance   Additional items Assist x 2; Increased time required;Verbal cues; Bedrails   Additional Comments supine to long sit with mod A x 2 and use of B bed rails   Pt able to tolerate long sit x 5' with min - mod A and cues Transfers   Sit to Stand Unable to assess   Balance   Static Sitting Poor   Endurance Deficit   Endurance Deficit Yes   Endurance Deficit Description fatigue, pain   Activity Tolerance   Activity Tolerance Patient limited by fatigue;Patient limited by pain   Medical Staff Scot Lazo [id number], OT; Pt seen for Co-treatment with Occupational Therapist due to pt's medical complexity, functional limitations and limited activity tolerance  Nurse Made Aware yes   Exercises   Glute Sets Supine;10 reps;AROM; Bilateral   Hip Flexion Supine;10 reps;AROM; Bilateral   Assessment   Prognosis Fair   Problem List Decreased strength;Decreased endurance; Impaired balance;Decreased mobility; Decreased safety awareness;Decreased skin integrity;Pain   Assessment Pt seen for PT treatment  Pt fatigued, but tolerated TE with cues for technique and supine to long-sit  At start of session, pt had right residual limb in flexion on green wedge  Wedge removed, and pt educated on importance of positioning residual limb in hip extension  Pt participated in rolling, and pads were changed under pt  Pt unable to tolerate full roll to right due to poor tolerance of pressure on wounds  Pt is improving with trunk control and able to briefly maintain long sitting with use of BUE  The patient's AM-PAC Basic Mobility Inpatient Short Form Raw Score is 8  A Raw score of less than or equal to 16 suggests the patient may benefit from discharge to post-acute rehabilitation services  Please also refer to the recommendation of the Physical Therapist for safe discharge planning  Continue to recommend STR to maximize safe, functional mobility at a W/C level  Barriers to Discharge Decreased caregiver support   Goals   STG Expiration Date 12/23/22   PT Treatment Day 12   Plan   Treatment/Interventions Therapeutic exercise; Endurance training;Patient/family training;Equipment eval/education; Bed mobility;OT;Spoke to nursing; Compensatory technique education   Progress Slow progress, decreased activity tolerance   PT Frequency 3-5x/wk   Recommendation   PT Discharge Recommendation Post acute rehabilitation services   AM-PAC Basic Mobility Inpatient   Turning in Bed Without Bedrails 2   Lying on Back to Sitting on Edge of Flat Bed 2   Moving Bed to Chair 1   Standing Up From Chair 1   Walk in Room 1   Climb 3-5 Stairs 1   Basic Mobility Inpatient Raw Score 8   Highest Level Of Mobility   JH-HLM Goal 3: Sit at edge of bed   JH-HLM Achieved 2: Bed activities/Dependent transfer   Education   Education Provided Mobility training;Home exercise program   Patient Demonstrates acceptance/verbal understanding   End of Consult   Patient Position at End of Consult Supine; All needs within reach     [facility]  - [facility]

## 2022-12-14 NOTE — PLAN OF CARE
Problem: PHYSICAL THERAPY ADULT  Goal: Performs mobility at highest level of function for planned discharge setting  See evaluation for individualized goals  Description: Treatment/Interventions: Functional transfer training, LE strengthening/ROM, Therapeutic exercise, Endurance training, Patient/family training, Equipment eval/education, Bed mobility, Compensatory technique education, Gait training, Continued evaluation, Spoke to nursing, Spoke to MD, Spoke to case management, OT          See flowsheet documentation for full assessment, interventions and recommendations  Outcome: Progressing  Note: Prognosis: Fair  Problem List: Decreased strength, Decreased endurance, Impaired balance, Decreased mobility, Decreased safety awareness, Decreased skin integrity, Pain  Assessment: Pt seen for PT treatment  Pt fatigued, but tolerated TE with cues for technique and supine to long-sit  At start of session, pt had right residual limb in flexion on green wedge  Wedge removed, and pt educated on importance of positioning residual limb in hip extension  Pt participated in rolling, and pads were changed under pt  Pt unable to tolerate full roll to right due to poor tolerance of pressure on wounds  Pt is improving with trunk control and able to briefly maintain long sitting with use of BUE  The patient's AM-PAC Basic Mobility Inpatient Short Form Raw Score is 8  A Raw score of less than or equal to 16 suggests the patient may benefit from discharge to post-acute rehabilitation services  Please also refer to the recommendation of the Physical Therapist for safe discharge planning  Continue to recommend STR to maximize safe, functional mobility at a W/C level  Barriers to Discharge: Decreased caregiver support  Barriers to Discharge Comments: alone  PT Discharge Recommendation: Post acute rehabilitation services    See flowsheet documentation for full assessment

## 2022-12-14 NOTE — PROGRESS NOTES
[address] [facility]  Progress Note - [facility] 1960, 65 y o  female MRN: [id number]  Unit/Bed#: E4 -01 Encounter: [id number]  Primary Care Provider: ALEX Will   Date and time admitted to hospital: 10/21/2022  7:58 PM    * PAD (peripheral artery disease) [facility])  Assessment & Plan  65year old female former smoker w/HTN, HLD, uncontrolled type II DM (A1c 9 8), hx CVA, presenting with nonhealing extensive L heel ulceration and R hallux and 5th digit ulceration  Vascular surgery consulted for input  S/p multiple b/l endovascular interventions  JUSTIN  R hemispheric CVA    S/p L AKA 11/23/22 [facility])  S/p R AKA, wound debridement 12/1/22 [facility])      Recommendations:  - Bilateral tissue loss in the setting of uncontrolled type II DM and NYDIA on admission, now s/p multiple angiograms w/intervention    - Endovascular interventions, c/b atheroembolic event to the RLE and JUSTIN with R hemispheric CVA  - Now s/p L AKA on 11/23/22 and R AKA 12/1/22 w/ proximal thigh wound debridements  - Left lateral AKA incision with some erythema/drainage  Started on Ancef - clean this morning, continue 1 week course  - Plastic surgery input appreciated  Recommending daily santyl to R thigh wounds and possible future wound VAC to areas    -Will need debridement of right thigh wound and possible vac placement - timing to be determined based on INR levels  - Continue argatroban drip per protocol will plan to bridge to Coumadin  Heme Onc note noted  -Argatroban gtt discontinued 12/12  Repeat INR yesterday 4 08, AM pending  Will determine dose pending INR today  - Medical management with statin, Plavix  - Medical management and glucose control per SLIM    - Continue oral pain regimen, adjusted to long acting which appears to be controlling patient's pain well   - Appreciate psych input and management - addition of wellbutrin and adjustment of zoloft   - Wound care orders placed Subjective: Patient seen and examined  Sleeping comfortably  Vitals:  BP (!) 186/81 (BP Location: Left arm)   Pulse 74   Temp 99 9 °F (37 7 °C) (Temporal)   Resp 20   Ht 5' 4" (1 626 m)   Wt 72 5 kg (159 lb 13 3 oz)   SpO2 94%   BMI 27 44 kg/m²     I/Os:  I/O last 3 completed shifts:  In: -   Out: 700 [Urine:700]  No intake/output data recorded  Lab Results and Cultures:   CBC with diff:   Lab Results   Component Value Date    WBC 10 76 (H) 12/14/2022    HGB 6 9 (LL) 12/14/2022    HCT 22 9 (L) 12/14/2022    MCV 86 12/14/2022     12/14/2022    MCH 26 0 (L) 12/14/2022    MCHC 30 1 (L) 12/14/2022    RDW 17 5 (H) 12/14/2022    MPV 8 1 (L) 12/14/2022    NRBC 0 12/05/2022   ,   BMP/CMP:  Lab Results   Component Value Date    SODIUM 141 12/11/2022    K 3 6 12/11/2022     12/11/2022    CO2 28 12/11/2022    BUN 12 12/11/2022    CREATININE 0 80 12/11/2022    CALCIUM 8 4 12/11/2022    AST 25 12/02/2022    ALT 12 12/02/2022    ALKPHOS 318 (H) 12/02/2022    EGFR 79 12/11/2022   ,   Lipid Panel: No results found for: CHOL,   Coags:   Lab Results   Component Value Date    PTT 95 (H) 12/12/2022    INR 4 08 (H) 12/13/2022   ,     Blood Culture:   Lab Results   Component Value Date    BLOODCX No Growth After 5 Days  11/13/2022    BLOODCX Staphylococcus coagulase negative (A) 11/13/2022   ,   Urinalysis:   Lab Results   Component Value Date    COLORU Yellow 11/12/2022    CLARITYU Slightly Cloudy 11/12/2022    SPECGRAV 1 025 11/12/2022    PHUR 5 5 11/12/2022    PHUR 6 5 02/23/2018    LEUKOCYTESUR Trace (A) 11/12/2022    NITRITE Positive (A) 11/12/2022    GLUCOSEU Negative 11/12/2022    KETONESU Negative 11/12/2022    BILIRUBINUR Negative 11/12/2022    BLOODU Large (A) 11/12/2022   ,   Urine Culture:   Lab Results   Component Value Date    URINECX >100,000 cfu/ml Enterobacter cloacae complex (A) 11/12/2022    URINECX 70,000-79,000 cfu/ml Kluyveromyces marxianus (A) 11/12/2022   ,   Wound Culure:  No results found for: WOUNDCULT    Medications:  Current Facility-Administered Medications   Medication Dose Route Frequency   • acetaminophen (TYLENOL) tablet 650 mg  650 mg Oral Q6H PRN   • ALPRAZolam (XANAX) tablet 0 25 mg  0 25 mg Oral Daily PRN   • aluminum-magnesium hydroxide-simethicone (MYLANTA) oral suspension 30 mL  30 mL Oral Q4H PRN   • amLODIPine (NORVASC) tablet 10 mg  10 mg Oral Daily   • atorvastatin (LIPITOR) tablet 40 mg  40 mg Oral Daily With Dinner   • buPROPion (WELLBUTRIN XL) 24 hr tablet 150 mg  150 mg Oral Daily   • ceFAZolin (ANCEF) IVPB (premix in dextrose) 2,000 mg 50 mL  2,000 mg Intravenous Q8H   • clopidogrel (PLAVIX) tablet 75 mg  75 mg Oral Daily   • collagenase (SANTYL) ointment   Topical Daily   • doxazosin (CARDURA) tablet 4 mg  4 mg Oral HS   • gabapentin (NEURONTIN) capsule 400 mg  400 mg Oral TID   • insulin glargine (LANTUS) subcutaneous injection 8 Units 0 08 mL  8 Units Subcutaneous HS   • insulin lispro (HumaLOG) 100 units/mL subcutaneous injection 1-6 Units  1-6 Units Subcutaneous TID AC   • labetalol (NORMODYNE) injection 10 mg  10 mg Intravenous Q6H PRN   • lisinopril (ZESTRIL) tablet 40 mg  40 mg Oral Daily   • methocarbamol (ROBAXIN) tablet 1,000 mg  1,000 mg Oral Q8H [facility]   • metoclopramide (REGLAN) injection 10 mg  10 mg Intravenous Q6H PRN   • metoprolol succinate (TOPROL-XL) 24 hr tablet 25 mg  25 mg Oral Daily   • naloxone (NARCAN) 0 04 mg/mL syringe 0 04 mg  0 04 mg Intravenous Q1MIN PRN   • nystatin (MYCOSTATIN) powder   Topical BID   • ondansetron (ZOFRAN) injection 4 mg  4 mg Intravenous Q6H PRN   • oxyCODONE (OxyCONTIN) 12 hr tablet 10 mg  10 mg Oral Q12H MICHELLE   • oxyCODONE (ROXICODONE) IR tablet 2 5 mg  2 5 mg Oral Q4H PRN    Or   • oxyCODONE (ROXICODONE) IR tablet 5 mg  5 mg Oral Q4H PRN    Or   • oxyCODONE (ROXICODONE) IR tablet 7 5 mg  7 5 mg Oral Q4H PRN   • pantoprazole (PROTONIX) EC tablet 40 mg  40 mg Oral Early Morning   • polyethylene glycol (MIRALAX) packet 17 g  17 g Oral BID   • QUEtiapine (SEROquel) tablet 25 mg  25 mg Oral HS   • senna (SENOKOT) tablet 8 6 mg  1 tablet Oral BID   • sertraline (ZOLOFT) tablet 175 mg  175 mg Oral Daily       Imaging:  Reviewed    Physical Exam:    General: Alert and oriented  In no acute distress  CV: Regular rate  Respiratory: Normal effort   Abdominal: Soft, nontender     Extremities: Bilateral AKA with wound breakdown on right thigh, left stump with staples macerated skin but does not look infected  Neurologic: Grossly normal       Thania Parada MD  12/14/2022

## 2022-12-14 NOTE — ASSESSMENT & PLAN NOTE
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

## 2022-12-14 NOTE — CASE MANAGEMENT
Case Management Progress Note    Patient name Stacie Ye  Location [facility] 4 /E4 [facility] MRN [id number]  : 1960 Date 2022       LOS (days): 53  Geometric Mean LOS (GMLOS) (days):   Days to GMLOS:        OBJECTIVE:        Current admission status: Inpatient  [facility] Pharm[facility]   [id number] [address] [place] [address] [address]  [address] [place] [address] [address]  [place] [id number]  Phone: [phone] Fax: [phone]    Primary Care Provider: ALEX Wynne    Primary Insurance: BLUE CROSS  Secondary Insurance:     PROGRESS NOTE:    Pt is still not medically clear for d/c to [facility]  Vascular & surgical team are taking pt into the OR tomorrow to determine if pt is in need of wound vac intervention  Pt is also still trying to bridge back onto coumadin as well  CM to continue to follow pt care & d/c

## 2022-12-14 NOTE — PLAN OF CARE
Problem: OCCUPATIONAL THERAPY ADULT  Goal: Performs self-care activities at highest level of function for planned discharge setting  See evaluation for individualized goals  Description: Treatment Interventions: ADL retraining, Functional transfer training, UE strengthening/ROM, Endurance training, Cognitive reorientation, Patient/family training, Equipment evaluation/education, Compensatory technique education, Energy conservation, Activityengagement          See flowsheet documentation for full assessment, interventions and recommendations  Outcome: Progressing  Note: Limitation: Decreased ADL status, Decreased UE strength, Decreased endurance, Decreased high-level ADLs (New L visual field deficits)  Prognosis: Good  Assessment: Patient participated in skilled OT session this date with interventions consisting of therapeutic activities, therapeutic exercises, neuro-reeducation, and self-care/ADL training to increase B UE strength, functional endurance/activity tolerance, static/dynamic sitting/standing balance, and overall safety awareness to increase (I) with ADLs/IADLs to return to PLOF  Provided education on HEP, energy conservation techniques, deep breathing techniques, fall prevention strategies, and overall safety awareness  Patient agreeable to OT treatment session, upon arrival patient was found supine in bed/seated OOB to Recliner  Patient requiring verbal cues for safety and verbal cues for pacing through activity steps  Please refer to flowsheet for functional performance  Therex completed in order to increase strength/endurance for bed mobility, transfers and ADL tasks; incoordination and L inattention noted with L UE during session, requires frequent cues for L UE during therex and difficulty grasping bedrail; some dysmetria noted  Resting tremors also noted  Pt able to long sit with min-maxA for balance for 5 min with B UE support, leaning to R side   She remains motivated to participate in therapy  Patient continues to be functioning below baseline level, occupational performance remains limited secondary to factors listed above and increased risk for falls and injury  Pt left seated in chair/supine in bed, alarm armed and call bell and bedside table within reach; all needs met  The patient's raw score on the AM-PAC Daily Activity inpatient short form is 12, standardized score is 30 6, less than 39 4  Patients at this level are likely to benefit from discharge to post-acute rehabilitation services  Please refer to the recommendation of the Occupational Therapist for safe discharge planning       OT Discharge Recommendation: Post acute rehabilitation services

## 2022-12-14 NOTE — ASSESSMENT & PLAN NOTE
Lab Results   Component Value Date    HGBA1C 9 8 (H) 10/25/2022     Recent Labs     12/13/22  2046 12/14/22  0748 12/14/22  1140 12/14/22  1627   POCGLU 170* 161* 172* 136     Significantly reduced regimen from home due to poor oral intake  Continue Lantus to 8 units nightly plus sliding scale  Adjust  insulin regimen as needed   Hold mealtime insulin with meal when n p o  for possible procedure

## 2022-12-14 NOTE — QUICK NOTE
Vascular Surgery      PT/INR obtained this AM and noted to be 8 47  Patient remains stable without clinical evidence of bleeding  Hgb was noted to be 6 9 this morning     Repeat INR and hgb  D/w SLIM

## 2022-12-14 NOTE — ASSESSMENT & PLAN NOTE
· She was initially admitted for nonhealing wound of the left lower extremity, requiring multiple endovascular interventions and ultimately underwent left AKA on 11/23/2022 and right AKA on 12/1/22  · Postoperative wound and pain management per primary service, plastic surgery  · Pain is  better controlled since her surgery  · Due to poor wound healing, vascular surgery is planning to debridement sometime this week once INR is  more acceptable    · Eventual short-term rehab placement when medically stable

## 2022-12-14 NOTE — WOUND OSTOMY CARE
Progress Note - Wound   Hannah Steiner 65 y o  female MRN: [id number]  Unit/Bed#: E4 -01 Encounter: [id number]        Assessment:   Patient is seen for left groin puncture site follow up  Patient examined in bed with primary nurse  Patient is continent of bowel and has an external urinary catheter in place  Patient is a maximum assist to turn in bed  Patient is on a p500 mattress  Vascular surgery is managing her amputation wounds  Findings  1  Left groin puncture site--healed    sacro-buttocks intact  perirectum slightly macerated at this time with no skin breakdown present currently  Skin care plans:  1-Hydraguard to bilateral sacrum, buttock BID and PRN  2-Cleanse perirectum with soap and water  Pat dry  Apply calazime TID and PRN  3-Ehob cushion in chair when out of bed  4-Moisturize skin daily with skin nourishing cream   5-Turn/reposition q2h or when medically stable for pressure re-distribution on skin         Wound Care will sign off  Call or Tigertext with any questions

## 2022-12-14 NOTE — PROGRESS NOTES
[facility]  [facility] 1960, 65 y o  female MRN: [id number]  Unit/Bed#: E4 -01 Encounter: [id number]  Primary Care Provider: ALEX Cruz   Date and time admitted to hospital: 10/21/2022  7:58 PM    * PAD (peripheral artery disease) ([address] )  Assessment & Plan  · She was initially admitted for nonhealing wound of the left lower extremity, requiring multiple endovascular interventions and ultimately underwent left AKA on 11/23/2022 and right AKA on 12/1/22  · Postoperative wound and pain management per primary service, plastic surgery  · Pain is  better controlled since her surgery  · Due to poor wound healing, vascular surgery is planning to debridement sometime this week once INR is  more acceptable  · Eventual short-term rehab placement when medically stable    Stroke [facility])  Assessment & Plan  · Noted to have change in mental status on 11/13/2022    · CT confirmed moderate right parietal lobe stroke, likely embolic in the setting of HIT with thrombosis  · She was treated with argatroban   · INR is currently supratherapeutic  · Coumadin will be held tonight    HIT (heparin-induced thrombocytopenia)  Assessment & Plan  · Positive heparin antibody on 11/11/2022  · Completed 5 days of overlap with argatroban and Coumadin   · INR supratherapeutic  · Coumadin dose managed by primary service     Type 2 diabetes mellitus with hyperglycemia, with long-term current use of insulin [facility])  Assessment & Plan  Lab Results   Component Value Date    HGBA1C 9 8 (H) 10/25/2022     Recent Labs     12/13/22  2046 12/14/22  0748 12/14/22  1140 12/14/22  1627   POCGLU 170* 161* 172* 136     Significantly reduced regimen from home due to poor oral intake  Continue Lantus to 8 units nightly plus sliding scale  Adjust  insulin regimen as needed   Hold mealtime insulin with meal when n p o  for possible procedure    Acute on chronic anemia  Assessment & Plan  · Monitor and transfuse as needed    Benign essential hypertension  Assessment & Plan  Continue amlodipine 10 mg daily, doxazosin 4 mg at bedtime, metoprolol 25 mg daily, lisinopril 40 mg daily with hold parameters    Depression, recurrent ([address] )  Assessment & Plan  Evaluated by Psychiatry x 2 on this admission  ·  continue Zoloft 175 mg daily, Seroquel 25 mg at bedtime  VTE Pharmacologic Prophylaxis:   Pharmacologic: Heparin  Mechanical VTE Prophylaxis in Place: No    Discussions with Specialists or Other Care Team Provider: Discussed with Bandar Kinsey    Time Spent for Care: 20 minutes  More than 50% of total time spent on counseling and coordination of care as described above  Current Length of Stay: 53 day(s)    Current Patient Status: Inpatient     Discharge Plan: To be determined    Code Status: Level 1 - Full Code    Subjective:   Denies any nosebleeds or gum bleeding  Overall her pain is better  No shortness of breath  She is not using supplemental oxygen  Objective:     Vitals:   Temp (24hrs), Av 9 °F (37 2 °C), Min:98 1 °F (36 7 °C), Max:99 9 °F (37 7 °C)    Temp:  [98 1 °F (36 7 °C)-99 9 °F (37 7 °C)] 98 6 °F (37 °C)  HR:  [74-87] 87  Resp:  [20] 20  BP: (157-186)/(64-81) 157/80  SpO2:  [94 %-95 %] 95 %  Body mass index is 27 44 kg/m²  Input and Output Summary (last 24 hours):     No intake or output data in the 24 hours ending 22 5648    Physical Exam:     Physical Exam  Constitutional:       Appearance: She is not ill-appearing  HENT:      Head: Normocephalic and atraumatic  Nose: No congestion or rhinorrhea  Eyes:      General: No scleral icterus  Cardiovascular:      Rate and Rhythm: Normal rate and regular rhythm  Pulmonary:      Effort: Pulmonary effort is normal  No respiratory distress  Breath sounds: No wheezing  Abdominal:      General: Abdomen is flat  Palpations: Abdomen is soft  Musculoskeletal:         General: Deformity present        Cervical back: Neck supple  Comments: Bilateral AKA   Neurological:      Mental Status: She is alert and oriented to person, place, and time  Psychiatric:         Behavior: Behavior normal        Additional Data:     Labs:    Results from last 7 days   Lab Units 12/14/22  1150 12/14/22  0545   WBC Thousand/uL  --  10 76*   HEMOGLOBIN g/dL 7 3* 6 9*   HEMATOCRIT % 24 7* 22 9*   PLATELETS Thousands/uL  --  329     Results from last 7 days   Lab Units 12/11/22  0533   SODIUM mmol/L 141   POTASSIUM mmol/L 3 6   CHLORIDE mmol/L 105   CO2 mmol/L 28   BUN mg/dL 12   CREATININE mg/dL 0 80   ANION GAP mmol/L 8   CALCIUM mg/dL 8 4   GLUCOSE RANDOM mg/dL 150*     Results from last 7 days   Lab Units 12/14/22  1150   INR  4 08*     Results from last 7 days   Lab Units 12/14/22  1627 12/14/22  1140 12/14/22  0748 12/13/22  2046 12/13/22  1514 12/13/22  1126 12/13/22  0753 12/12/22  2155 12/12/22  1545 12/12/22  1103 12/12/22  0734 12/11/22  2107   POC GLUCOSE mg/dl 136 172* 161* 170* 179* 139 154* 138 136 156* 126 149*                   * I Have Reviewed All Lab Data Listed Above  * Additional Pertinent Lab Tests Reviewed:  All Labs Within Last 24 Hours Reviewed    Imaging:    Imaging Reports Reviewed Today Include: None    Recent Cultures (last 7 days):           Last 24 Hours Medication List:   Current Facility-Administered Medications   Medication Dose Route Frequency Provider Last Rate   • acetaminophen  650 mg Oral Q6H PRN Stephanie Buck MD     • ALPRAZolam  0 25 mg Oral Daily PRN Noel Lopez PA-C     • aluminum-magnesium hydroxide-simethicone  30 mL Oral Q4H PRN Dana Grady DO     • amLODIPine  10 mg Oral Daily Noel Lopez PA-C     • atorvastatin  40 mg Oral Daily With [address], PARonC     • buPROPion  150 mg Oral Daily Jacobo Dorsey MD     • cefazolin  2,000 mg Intravenous Q8H Kelsy Obrien MD 2,000 mg (12/14/22 0957)   • clopidogrel  75 mg Oral Daily Noel Lopez KETURAH     • collagenase   Topical Daily Alonso Michelle MD     • doxazosin  4 mg Oral HS Candy Batch, DO     • gabapentin  400 mg Oral TID Pink Severin, MD     • insulin glargine  8 Units Subcutaneous HS Candy Batch, DO     • insulin lispro  1-6 Units Subcutaneous TID [facility] [place] Nicanor Tinsley PA-C     • labetalol  10 mg Intravenous Q6H PRN Candy Batch, DO     • lisinopril  40 mg Oral Daily Candy Batch, DO     • methocarbamol  1,000 mg Oral [facility] Pink Severin, MD     • metoclopramide  10 mg Intravenous Q6H PRN Nicanor Tinsley PA-C     • metoprolol succinate  25 mg Oral Daily Candy Batch, DO     • naloxone  0 04 mg Intravenous Q1MIN PRN Nicanor Tinsley PA-C     • nystatin   Topical BID Nicanor Tinsley PA-C     • ondansetron  4 mg Intravenous Q6H PRN Nicanor Tinsley PA-C     • oxyCODONE  10 mg Oral Q12H [facility] Pink Severin, MD     • oxyCODONE  2 5 mg Oral Q4H PRN Pink Severin, MD      Or   • oxyCODONE  5 mg Oral Q4H PRN Pink Severin, MD      Or   • oxyCODONE  7 5 mg Oral Q4H PRN Pink Severin, MD     • pantoprazole  40 mg Oral Early Morning Nicanor Tinsley PA-C     • polyethylene glycol  17 g Oral BID Nicanor Tinsley PA-C     • QUEtiapine  25 mg Oral HS Nicanor Tinsley PA-C     • senna  1 tablet Oral BID Nicanor Tinsley PA-C     • sertraline  175 mg Oral Daily Kim Guthrie MD          Today, Patient Was Seen By: Nathalia Molina MD    ** Please Note: Dictation voice to text software may have been used in the creation of this document   **

## 2022-12-15 LAB
ABO GROUP BLD: NORMAL
BLD GP AB SCN SERPL QL: NEGATIVE
ERYTHROCYTE [DISTWIDTH] IN BLOOD BY AUTOMATED COUNT: 17.2 % (ref 11.6–15.1)
GLUCOSE SERPL-MCNC: 167 MG/DL (ref 65–140)
GLUCOSE SERPL-MCNC: 177 MG/DL (ref 65–140)
GLUCOSE SERPL-MCNC: 182 MG/DL (ref 65–140)
GLUCOSE SERPL-MCNC: 202 MG/DL (ref 65–140)
HCT VFR BLD AUTO: 22.9 % (ref 34.8–46.1)
HGB BLD-MCNC: 6.9 G/DL (ref 11.5–15.4)
INR PPP: 2.42 (ref 0.84–1.19)
MCH RBC QN AUTO: 26.3 PG (ref 26.8–34.3)
MCHC RBC AUTO-ENTMCNC: 30.1 G/DL (ref 31.4–37.4)
MCV RBC AUTO: 87 FL (ref 82–98)
PLATELET # BLD AUTO: 285 THOUSANDS/UL (ref 149–390)
PMV BLD AUTO: 7.8 FL (ref 8.9–12.7)
PROTHROMBIN TIME: 26.2 SECONDS (ref 11.6–14.5)
RBC # BLD AUTO: 2.62 MILLION/UL (ref 3.81–5.12)
RH BLD: NEGATIVE
SPECIMEN EXPIRATION DATE: NORMAL
WBC # BLD AUTO: 8.91 THOUSAND/UL (ref 4.31–10.16)

## 2022-12-15 PROCEDURE — 85610 PROTHROMBIN TIME: CPT | Performed by: SURGERY

## 2022-12-15 PROCEDURE — P9016 RBC LEUKOCYTES REDUCED: HCPCS

## 2022-12-15 PROCEDURE — 82948 REAGENT STRIP/BLOOD GLUCOSE: CPT

## 2022-12-15 PROCEDURE — 86900 BLOOD TYPING SEROLOGIC ABO: CPT | Performed by: SURGERY

## 2022-12-15 PROCEDURE — 85027 COMPLETE CBC AUTOMATED: CPT | Performed by: SURGERY

## 2022-12-15 PROCEDURE — 86850 RBC ANTIBODY SCREEN: CPT | Performed by: SURGERY

## 2022-12-15 PROCEDURE — 99232 SBSQ HOSP IP/OBS MODERATE 35: CPT | Performed by: INTERNAL MEDICINE

## 2022-12-15 PROCEDURE — 99024 POSTOP FOLLOW-UP VISIT: CPT | Performed by: SURGERY

## 2022-12-15 PROCEDURE — 86923 COMPATIBILITY TEST ELECTRIC: CPT

## 2022-12-15 PROCEDURE — 86901 BLOOD TYPING SEROLOGIC RH(D): CPT | Performed by: SURGERY

## 2022-12-15 RX ORDER — WARFARIN SODIUM 2.5 MG/1
2.5 TABLET ORAL
Status: DISCONTINUED | OUTPATIENT
Start: 2022-12-15 | End: 2022-12-15

## 2022-12-15 RX ORDER — WARFARIN SODIUM 2 MG/1
2 TABLET ORAL
Status: DISCONTINUED | OUTPATIENT
Start: 2022-12-15 | End: 2022-12-16

## 2022-12-15 RX ORDER — SODIUM CHLORIDE 9 MG/ML
100 INJECTION, SOLUTION INTRAVENOUS CONTINUOUS
Status: DISCONTINUED | OUTPATIENT
Start: 2022-12-16 | End: 2022-12-16

## 2022-12-15 RX ADMIN — PANTOPRAZOLE SODIUM 40 MG: 40 TABLET, DELAYED RELEASE ORAL at 05:25

## 2022-12-15 RX ADMIN — GABAPENTIN 400 MG: 400 CAPSULE ORAL at 08:32

## 2022-12-15 RX ADMIN — OXYCODONE HYDROCHLORIDE 10 MG: 10 TABLET, FILM COATED, EXTENDED RELEASE ORAL at 08:34

## 2022-12-15 RX ADMIN — AMLODIPINE BESYLATE 10 MG: 10 TABLET ORAL at 08:32

## 2022-12-15 RX ADMIN — GABAPENTIN 400 MG: 400 CAPSULE ORAL at 16:43

## 2022-12-15 RX ADMIN — INSULIN LISPRO 1 UNITS: 100 INJECTION, SOLUTION INTRAVENOUS; SUBCUTANEOUS at 16:44

## 2022-12-15 RX ADMIN — COLLAGENASE SANTYL: 250 OINTMENT TOPICAL at 08:33

## 2022-12-15 RX ADMIN — CEFAZOLIN SODIUM 2000 MG: 2 SOLUTION INTRAVENOUS at 04:03

## 2022-12-15 RX ADMIN — CEFAZOLIN SODIUM 2000 MG: 2 SOLUTION INTRAVENOUS at 18:40

## 2022-12-15 RX ADMIN — DOXAZOSIN 4 MG: 4 TABLET ORAL at 22:01

## 2022-12-15 RX ADMIN — QUETIAPINE FUMARATE 25 MG: 25 TABLET ORAL at 22:01

## 2022-12-15 RX ADMIN — METHOCARBAMOL 1000 MG: 500 TABLET ORAL at 05:25

## 2022-12-15 RX ADMIN — OXYCODONE HYDROCHLORIDE 10 MG: 10 TABLET, FILM COATED, EXTENDED RELEASE ORAL at 22:01

## 2022-12-15 RX ADMIN — ATORVASTATIN CALCIUM 40 MG: 40 TABLET, FILM COATED ORAL at 16:43

## 2022-12-15 RX ADMIN — METHOCARBAMOL 1000 MG: 500 TABLET ORAL at 13:41

## 2022-12-15 RX ADMIN — WARFARIN SODIUM 2 MG: 2 TABLET ORAL at 17:04

## 2022-12-15 RX ADMIN — POLYETHYLENE GLYCOL 3350 17 G: 17 POWDER, FOR SOLUTION ORAL at 08:32

## 2022-12-15 RX ADMIN — CEFAZOLIN SODIUM 2000 MG: 2 SOLUTION INTRAVENOUS at 11:05

## 2022-12-15 RX ADMIN — NYSTATIN: 100000 POWDER TOPICAL at 17:04

## 2022-12-15 RX ADMIN — ALUMINUM HYDROXIDE, MAGNESIUM HYDROXIDE, AND SIMETHICONE 30 ML: 200; 200; 20 SUSPENSION ORAL at 10:36

## 2022-12-15 RX ADMIN — CLOPIDOGREL BISULFATE 75 MG: 75 TABLET ORAL at 08:32

## 2022-12-15 RX ADMIN — INSULIN LISPRO 1 UNITS: 100 INJECTION, SOLUTION INTRAVENOUS; SUBCUTANEOUS at 08:33

## 2022-12-15 RX ADMIN — INSULIN LISPRO 2 UNITS: 100 INJECTION, SOLUTION INTRAVENOUS; SUBCUTANEOUS at 11:45

## 2022-12-15 RX ADMIN — INSULIN GLARGINE 8 UNITS: 100 INJECTION, SOLUTION SUBCUTANEOUS at 22:01

## 2022-12-15 RX ADMIN — NYSTATIN: 100000 POWDER TOPICAL at 08:33

## 2022-12-15 RX ADMIN — METHOCARBAMOL 1000 MG: 500 TABLET ORAL at 22:01

## 2022-12-15 RX ADMIN — METOPROLOL SUCCINATE 25 MG: 25 TABLET, EXTENDED RELEASE ORAL at 08:33

## 2022-12-15 RX ADMIN — LISINOPRIL 40 MG: 20 TABLET ORAL at 08:33

## 2022-12-15 RX ADMIN — SENNOSIDES 8.6 MG: 8.6 TABLET, FILM COATED ORAL at 17:04

## 2022-12-15 RX ADMIN — BUPROPION HYDROCHLORIDE 150 MG: 150 TABLET, FILM COATED, EXTENDED RELEASE ORAL at 08:32

## 2022-12-15 RX ADMIN — SERTRALINE HYDROCHLORIDE 175 MG: 100 TABLET ORAL at 08:32

## 2022-12-15 RX ADMIN — SENNOSIDES 8.6 MG: 8.6 TABLET, FILM COATED ORAL at 08:32

## 2022-12-15 RX ADMIN — GABAPENTIN 400 MG: 400 CAPSULE ORAL at 22:01

## 2022-12-15 NOTE — PROGRESS NOTES
[facility]  [facility] 1960, 65 y o  female MRN: [id number]  Unit/Bed#: E4 -01 Encounter: [id number]  Primary Care Provider: ALEX Alberto   Date and time admitted to hospital: 10/21/2022  7:58 PM    * PAD (peripheral artery disease) [address] )  Assessment & Plan  · She was initially admitted for nonhealing wound of the left lower extremity, requiring multiple endovascular interventions and ultimately underwent left AKA on 11/23/2022 and right AKA on 12/1/22  · Postoperative wound and pain management per primary service, plastic surgery  · Pain is  better controlled since her surgery  · Due to poor wound healing, vascular surgery is planning to debride the wounds in the OR tomorrow  · Eventual short-term rehab placement when medically stable    Stroke [facility])  Assessment & Plan  · Noted to have change in mental status on 11/13/2022    · CT confirmed moderate right parietal lobe stroke, likely embolic in the setting of HIT with thrombosis  · She was treated with argatroban/coumadin bridge  · INR is currently supratherapeutic    HIT (heparin-induced thrombocytopenia)  Assessment & Plan  · Positive heparin antibody on 11/11/2022  · Completed 5 days of overlap with argatroban and Coumadin   · INR now therapeutic    Type 2 diabetes mellitus with hyperglycemia, with long-term current use of insulin [facility])  Assessment & Plan  Lab Results   Component Value Date    HGBA1C 9 8 (H) 10/25/2022     Recent Labs     12/14/22  2113 12/15/22  0740 12/15/22  1129 12/15/22  1550   POCGLU 189* 182* 202* 167*     Significantly reduced regimen from home due to poor oral intake  Goal sugar < 180  Monitor post surgery  If still not at goal, will increase  Lantus to 10 units nightly   For now keep at 8 units since she will have surgery tomorrow  Continue sliding scale  Adjust  insulin regimen as needed   Goal sugar < 180  Hold mealtime insulin with meal when n p o  for possible procedure    Acute on chronic anemia  Assessment & Plan  · Notified primary service regarding anemia  · Discussed transfusion with patient and she had no objections   · Anticipate 1-2 units to be given prior to procedure    Benign essential hypertension  Assessment & Plan  Continue amlodipine 10 mg daily, doxazosin 4 mg at bedtime, metoprolol 25 mg daily, lisinopril 40 mg daily with hold parameters    Depression, recurrent (HCC)  Assessment & Plan  Evaluated by Psychiatry x 2 on this admission  ·  continue Zoloft 175 mg daily, Seroquel 25 mg at bedtime  VTE Pharmacologic Prophylaxis:   Pharmacologic: Warfarin (Coumadin)  Mechanical VTE Prophylaxis in Place: No    Discussions with Specialists or Other Care Team Provider: Communicated with surgeon on call Dr Irasema Castillo    Education and Discussions with Family / Patient: spoke with Jhony Jacinto and gave update    Time Spent for Care: 20 minutes  More than 50% of total time spent on counseling and coordination of care as described above  Current Length of Stay: 54 day(s)    Current Patient Status: Inpatient   Certification Statement: The patient will continue to require additional inpatient hospital stay due to debridement    Discharge Plan: rehab     Code Status: Level 1 - Full Code    Subjective:   Pain is controlled  No SOB  She is aware of her procedure tomorrow    Objective:     Vitals:   Temp (24hrs), Av °F (36 7 °C), Min:97 4 °F (36 3 °C), Max:98 9 °F (37 2 °C)    Temp:  [97 4 °F (36 3 °C)-98 9 °F (37 2 °C)] 97 6 °F (36 4 °C)  HR:  [82-90] 82  Resp:  [18-20] 20  BP: (143-170)/(64-72) 170/72  SpO2:  [95 %-96 %] 96 %  Body mass index is 27 32 kg/m²  Input and Output Summary (last 24 hours): Intake/Output Summary (Last 24 hours) at 12/15/2022 1721  Last data filed at 12/15/2022 1449  Gross per 24 hour   Intake 300 ml   Output 450 ml   Net -150 ml       Physical Exam:     Physical Exam  Vitals reviewed     Constitutional:       Appearance: She is not ill-appearing  HENT:      Head: Normocephalic and atraumatic  Nose: No congestion or rhinorrhea  Eyes:      General: No scleral icterus  Cardiovascular:      Rate and Rhythm: Normal rate and regular rhythm  Pulmonary:      Effort: Pulmonary effort is normal  No respiratory distress  Breath sounds: No wheezing or rales  Abdominal:      General: Abdomen is flat  There is no distension  Palpations: Abdomen is soft  Tenderness: There is no abdominal tenderness  Musculoskeletal:         General: Deformity present  Cervical back: Neck supple  Comments: Bilateral aka   Neurological:      Mental Status: She is oriented to person, place, and time  Psychiatric:         Mood and Affect: Mood normal          Behavior: Behavior normal        Additional Data:     Labs:    Results from last 7 days   Lab Units 12/15/22  0755   WBC Thousand/uL 8 91   HEMOGLOBIN g/dL 6 9*   HEMATOCRIT % 22 9*   PLATELETS Thousands/uL 285     Results from last 7 days   Lab Units 12/11/22  0533   SODIUM mmol/L 141   POTASSIUM mmol/L 3 6   CHLORIDE mmol/L 105   CO2 mmol/L 28   BUN mg/dL 12   CREATININE mg/dL 0 80   ANION GAP mmol/L 8   CALCIUM mg/dL 8 4   GLUCOSE RANDOM mg/dL 150*     Results from last 7 days   Lab Units 12/15/22  0755   INR  2 42*     Results from last 7 days   Lab Units 12/15/22  1550 12/15/22  1129 12/15/22  0740 12/14/22  2113 12/14/22  1627 12/14/22  1140 12/14/22  0748 12/13/22  2046 12/13/22  1514 12/13/22  1126 12/13/22  0753 12/12/22  2155   POC GLUCOSE mg/dl 167* 202* 182* 189* 136 172* 161* 170* 179* 139 154* 138                   * I Have Reviewed All Lab Data Listed Above  * Additional Pertinent Lab Tests Reviewed:  All Labs Within Last 24 Hours Reviewed    Imaging:    Imaging Reports Reviewed Today Include: none    Recent Cultures (last 7 days):           Last 24 Hours Medication List:   Current Facility-Administered Medications   Medication Dose Route Frequency Provider Last Rate • acetaminophen  650 mg Oral Q6H PRN Olman Williamson MD     • ALPRAZolam  0 25 mg Oral Daily PRN Radhika Zaragoza PA-C     • aluminum-magnesium hydroxide-simethicone  30 mL Oral Q4H PRN Dakotah Kinza, DO     • amLODIPine  10 mg Oral Daily Radhika Zaragoza PA-C     • atorvastatin  40 mg Oral Daily With [address], PA-C     • buPROPion  150 mg Oral Daily Lucia Jogre MD     • cefazolin  2,000 mg Intravenous Q8H Shannan Barton MD 2,000 mg (12/15/22 1105)   • clopidogrel  75 mg Oral Daily Radhika Zaragoza PA-C     • collagenase   Topical Daily Estefany August MD     • doxazosin  4 mg Oral HS Dakotah Kinza, DO     • gabapentin  400 mg Oral TID Gabby Chun MD     • insulin glargine  8 Units Subcutaneous HS Dakotah Kinza, DO     • insulin lispro  1-6 Units Subcutaneous TID [facility] Radhika Zaragoza PA-C     • labetalol  10 mg Intravenous Q6H PRN Dakotah Kinza, DO     • lisinopril  40 mg Oral Daily Dakotah Kinza, DO     • methocarbamol  1,000 mg Oral [facility] Gabby Chun MD     • metoclopramide  10 mg Intravenous Q6H PRN Radhika Zaragoza PA-C     • metoprolol succinate  25 mg Oral Daily Dakotah Kinza, DO     • naloxone  0 04 mg Intravenous Q1MIN PRN Radhika Zaragoza PA-C     • nystatin   Topical BID Radhika Zaragoza PA-C     • ondansetron  4 mg Intravenous Q6H PRN Radhika Zaragoza PA-C     • oxyCODONE  10 mg Oral Q12H [address] Gabby Chun MD     • oxyCODONE  2 5 mg Oral Q4H PRN Gabby Chun MD      Or   • oxyCODONE  5 mg Oral Q4H PRN Gabby Chun MD      Or   • oxyCODONE  7 5 mg Oral Q4H PRN Gabby Chun MD     • pantoprazole  40 mg Oral Early Morning Radhika Zaragoza PA-C     • polyethylene glycol  17 g Oral BID Radhika Zaragoza PA-C     • QUEtiapine  25 mg Oral HS Radhika Zaragoza PA-C     • senna  1 tablet Oral BID Radhika Zaragoza PA-C     • sertraline  175 mg Oral Daily Lucia Jorge MD     • [START ON 12/16/2022] sodium chloride  100 mL/hr Intravenous Continuous Che Shafer MD     • warfarin  2 mg Oral Daily (warfarin) Grace Henao PA-C          Today, Patient Was Seen By: Danny Sears MD    ** Please Note: Dictation voice to text software may have been used in the creation of this document   **

## 2022-12-15 NOTE — ANESTHESIA PREPROCEDURE EVALUATION
Procedure: AKA STUMP DEBRIDEMENT WOUND AND DRESSING CHANGE [facility]) (Right: Thigh)    Relevant Problems   CARDIO   (+) Benign essential hypertension   (+) HLD              ENDO   (+) Hyperparathyroidism (HCC)   (+) Type 2 diabetes mellitus with hyperglycemia, with long-term current use of insulin (HCC)              GI/HEPATIC   (+) Esophageal reflux      HEMATOLOGY   (+) Acute on chronic anemia   (+) HIT (heparin-induced thrombocytopenia)      MUSCULOSKELETAL   (+) Arthritis      NEURO/PSYCH       (+) CVA (cerebral vascular accident) ([address] )   (+) Depression, recurrent ([address] )   (+) Diabetic peripheral neuropathy ([address] )   (+) Stroke ([address] )      (+) PAD    11/14/22 ECHO  Left Ventricle Left ventricular cavity size is normal  Wall thickness is normal  The left ventricular ejection fraction is 61%  Systolic function is normal   Wall motion is normal  Diastolic function is normal    Right Ventricle Right ventricular cavity size is normal  Systolic function is normal  Wall thickness is normal    Left Atrium The atrium is normal in size  Right Atrium The atrium is normal in size  Aortic Valve The aortic valve is trileaflet  The leaflets are mildly thickened  The leaflets are mildly calcified  The leaflets exhibit normal mobility  There is no evidence of regurgitation  The aortic valve has no significant stenosis  Mitral Valve The leaflets exhibit normal mobility  There is mild annular calcification  There is no evidence of regurgitation  There is no evidence of stenosis  The valve has normal function  Tricuspid Valve Tricuspid valve structure is normal  There is no evidence of regurgitation  There is no evidence of stenosis  Pulmonic Valve Pulmonic valve structure is normal  There is no evidence of regurgitation  There is no evidence of stenosis  Ascending Aorta The aortic root is normal in size  IVC/SVC The inferior vena cava is top normal in size  Pericardium There is no pericardial effusion   The pericardium is normal in appearance  Physical Exam    Airway    Mallampati score: II  TM Distance: >3 FB       Dental   upper dentures and lower dentures,     Cardiovascular  Rhythm: regular, Rate: normal,     Pulmonary  Breath sounds clear to auscultation,     Other Findings        Anesthesia Plan  ASA Score- 4     Anesthesia Type- general with ASA Monitors  Additional Monitors:   Airway Plan:     Comment: Femoral nerve block discussed with patient  Plan Factors-    Chart reviewed  Existing labs reviewed  Induction- intravenous  Postoperative Plan- Plan for postoperative opioid use  Planned trial extubation    Informed Consent- Anesthetic plan and risks discussed with patient

## 2022-12-15 NOTE — ASSESSMENT & PLAN NOTE
Lab Results   Component Value Date    HGBA1C 9 8 (H) 10/25/2022     Recent Labs     12/14/22  2113 12/15/22  0740 12/15/22  1129 12/15/22  1550   POCGLU 189* 182* 202* 167*     Significantly reduced regimen from home due to poor oral intake  Goal sugar < 180  Monitor post surgery  If still not at goal, will increase  Lantus to 10 units nightly   For now keep at 8 units since she will have surgery tomorrow  Continue sliding scale  Adjust  insulin regimen as needed   Goal sugar < 180  Hold mealtime insulin with meal when n p o  for possible procedure

## 2022-12-15 NOTE — UTILIZATION REVIEW
Continued Stay Review    Date:12/15/22                          Current Patient Class: INPT Current Level of Care: MED-SURG    HPI:62 y o  female initially admitted on 10/22  Assessment/Plan:Sleeping comfortably  Bilateral AKA with wound breakdown on right thigh, left stump with staples macerated skin but does not look infected  S/p multiple b/l endovascular interventions  JUSTIN  R hemispheric CVA  S/p L AKA 11/23/22  S/p R AKA, wound debridement 12/1/22  Left lateral AKA incision with some erythema/drainage  Started on Ancef, last dose today  tomorrow for debridement possible vac  Plastic surgery recommending daily santyl to R thigh wounds  [email or web address]  Argatroban gtt discontinued 12/12  Repeat INR yesterday 4 08, AM pending  statin, Plavix      Vital Signs:   12/15/22 0738 97 4 °F (36 3 °C) Abnormal  85 18 160/64 -- 95 % None (Room air) Lying   12/14/22 2341 98 9 °F (37 2 °C) 90 18 143/68 96 95 % None (Room air) Lying   12/14/22 2148 -- 88 -- 161/68 -- -- -- --   12/14/22 1445 98 6 °F (37 °C) 87 20 157/80 -- 95 % None (Room air) Lying   12/14/22 0805 98 1 °F (36 7 °C) 85 20 164/64 87 94 % None (Room air) Lying   12/13/22 2119 -- -- -- -- -- -- None (Room air) --   12/13/22 2113 99 9 °F (37 7 °C) 74 20 186/81 Abnormal  -- 94 % -- Lying   12/13/22 1458 98 4 °F (36 9 °C) 92 18 161/85 112 93 % None (Room air) Lying         Pertinent Labs/Diagnostic Results:       Results from last 7 days   Lab Units 12/15/22  0755 12/14/22  1150 12/14/22  0545 12/12/22  0536 12/11/22  0533   WBC Thousand/uL 8 91  --  10 76* 13 41* 12 63*   HEMOGLOBIN g/dL 6 9* 7 3* 6 9* 7 4* 7 4*   HEMATOCRIT % 22 9* 24 7* 22 9* 24 6* 24 9*   PLATELETS Thousands/uL 285  --  329 405* 416*         Results from last 7 days   Lab Units 12/11/22  0533 12/09/22  0352   SODIUM mmol/L 141 141   POTASSIUM mmol/L 3 6 4 0   CHLORIDE mmol/L 105 105   CO2 mmol/L 28 27   ANION GAP mmol/L 8 9   BUN mg/dL 12 14   CREATININE mg/dL 0 80 0 78   EGFR ml/min/1 73sq m 79 82 CALCIUM mg/dL 8 4 7 8*         Results from last 7 days   Lab Units 12/15/22  0740 12/14/22  2113 12/14/22  1627 12/14/22  1140 12/14/22  0748 12/13/22  2046 12/13/22  1514 12/13/22  1126 12/13/22  0753 12/12/22  2155 12/12/22  1545 12/12/22  1103   POC GLUCOSE mg/dl 182* 189* 136 172* 161* 170* 179* 139 154* 138 136 156*     Results from last 7 days   Lab Units 12/11/22  0533 12/09/22  0352   GLUCOSE RANDOM mg/dL 150* 142*       Results from last 7 days   Lab Units 12/15/22  0755 12/14/22  1150 12/14/22  0951 12/12/22  1223 12/12/22  0536 12/12/22  0210 12/11/22  1244   PROTIME seconds 26 2* 39 2* 69 2*   < > 48 4*  --   --    INR  2 42* 4 08* 8 47*   < > 5 34*  --   --    PTT seconds  --   --   --   --  95* 98* 73*    < > = values in this interval not displayed           Medications:   Scheduled Medications:  amLODIPine, 10 mg, Oral, Daily  atorvastatin, 40 mg, Oral, Daily With Dinner  buPROPion, 150 mg, Oral, Daily  cefazolin, 2,000 mg, Intravenous, Q8H  clopidogrel, 75 mg, Oral, Daily  collagenase, , Topical, Daily  doxazosin, 4 mg, Oral, HS  gabapentin, 400 mg, Oral, TID  insulin glargine, 8 Units, Subcutaneous, HS  insulin lispro, 1-6 Units, Subcutaneous, TID AC  lisinopril, 40 mg, Oral, Daily  methocarbamol, 1,000 mg, Oral, Q8H MICHELLE  metoprolol succinate, 25 mg, Oral, Daily  nystatin, , Topical, BID  oxyCODONE, 10 mg, Oral, Q12H MICHELLE  pantoprazole, 40 mg, Oral, Early Morning  polyethylene glycol, 17 g, Oral, BID  QUEtiapine, 25 mg, Oral, HS  senna, 1 tablet, Oral, BID  sertraline, 175 mg, Oral, Daily  warfarin, 2 5 mg, Oral, Daily (warfarin)      Continuous IV Infusions:  [START ON 12/16/2022] sodium chloride, 100 mL/hr, Intravenous, Continuous      PRN Meds:  acetaminophen, 650 mg, Oral, Q6H PRN  ALPRAZolam, 0 25 mg, Oral, Daily PRN  aluminum-magnesium hydroxide-simethicone, 30 mL, Oral, Q4H PRN 12/15 X1  labetalol, 10 mg, Intravenous, Q6H PRN  metoclopramide, 10 mg, Intravenous, Q6H PRN  naloxone, 0 04 mg, Intravenous, Q1MIN PRN  ondansetron, 4 mg, Intravenous, Q6H PRN  oxyCODONE, 2 5 mg, Oral, Q4H PRN   Or  oxyCODONE, 5 mg, Oral, Q4H PRN   Or  oxyCODONE, 7 5 mg, Oral, Q4H PRN 12/14 X1        Discharge Plan: D    Network Utilization Review Department  ATTENTION: Please call with any questions or concerns to [phone] and carefully listen to the prompts so that you are directed to the right person  All voicemails are confidential   Lynette Kussmaul all requests for admission clinical reviews, approved or denied determinations and any other requests to dedicated fax number below belonging to the campus where the patient is receiving treatment   List of dedicated fax numbers for the Facilities:  [address] DENIALS (Administrative/Medical Necessity) [phone]   [address] (Maternity/NICU/Pediatrics) [phone]   [address] [phone]   [facility] Elsie  [phone]   [address] [address] [address] [place] [address] [facility] [phone]   [address] [facility] [address] [phone]

## 2022-12-15 NOTE — ASSESSMENT & PLAN NOTE
· Noted to have change in mental status on 11/13/2022    · CT confirmed moderate right parietal lobe stroke, likely embolic in the setting of HIT with thrombosis  · She was treated with argatroban/coumadin bridge  · INR is currently supratherapeutic

## 2022-12-15 NOTE — PLAN OF CARE
Problem: Potential for Falls  Goal: Patient will remain free of falls  Description: INTERVENTIONS:  - Educate patient/family on patient safety including physical limitations  - Instruct patient to call for assistance with activity   - Consult OT/PT to assist with strengthening/mobility   - Keep Call bell within reach  - Keep bed low and locked with side rails adjusted as appropriate  - Keep care items and personal belongings within reach  - Initiate and maintain comfort rounds  - Make Fall Risk Sign visible to staff  - Offer Toileting every 2 Hours, in advance of need  - Initiate/Maintain bed  chair alarm  - Obtain necessary fall risk management equipment: bed chair alarm, call bell, gripper sock, walker, bedside commode  - Apply yellow socks and bracelet for high fall risk patients  - Consider moving patient to room near nurses station  Outcome: Progressing     Problem: PAIN - ADULT  Goal: Verbalizes/displays adequate comfort level or baseline comfort level  Description: Interventions:  - Encourage patient to monitor pain and request assistance  - Assess pain using appropriate pain scale  - Administer analgesics based on type and severity of pain and evaluate response  - Implement non-pharmacological measures as appropriate and evaluate response  - Consider cultural and social influences on pain and pain management  - Notify physician/advanced practitioner if interventions unsuccessful or patient reports new pain  Outcome: Progressing     Problem: INFECTION - ADULT  Goal: Absence or prevention of progression during hospitalization  Description: INTERVENTIONS:  - Assess and monitor for signs and symptoms of infection  - Monitor lab/diagnostic results  - Monitor all insertion sites, i e  indwelling lines, tubes, and drains  - Administer medications as ordered  - Instruct and encourage patient and family to use good hand hygiene technique  Outcome: Progressing     Problem: SAFETY ADULT  Goal: Patient will remain free of falls  Description: INTERVENTIONS:  - Educate patient/family on patient safety including physical limitations  - Instruct patient to call for assistance with activity   - Consult OT/PT to assist with strengthening/mobility   - Keep Call bell within reach  - Keep bed low and locked with side rails adjusted as appropriate  - Keep care items and personal belongings within reach  - Initiate and maintain comfort rounds  - Make Fall Risk Sign visible to staff  - Offer Toileting every 2 Hours, in advance of need  - Initiate/Maintain bed  chair alarm  - Obtain necessary fall risk management equipment: bed chair alarm, call bell, gripper sock, walker, bedside commode  - Apply yellow socks and bracelet for high fall risk patients  - Consider moving patient to room near nurses station  Outcome: Progressing     Problem: METABOLIC, FLUID AND ELECTROLYTES - ADULT  Goal: Electrolytes maintained within normal limits  Description: INTERVENTIONS:  - Monitor labs and assess patient for signs and symptoms of electrolyte imbalances  - Administer electrolyte replacement as ordered  - Monitor response to electrolyte replacements, including repeat lab results as appropriate  - Instruct patient on fluid and nutrition as appropriate  Outcome: Progressing  Goal: Fluid balance maintained  Description: INTERVENTIONS:  - Monitor labs   - Monitor I/O and WT  - Instruct patient on fluid and nutrition as appropriate  - Assess for signs & symptoms of volume excess or deficit  Outcome: Progressing  Goal: Glucose maintained within target range  Description: INTERVENTIONS:  - Monitor Blood Glucose as ordered  - Assess for signs and symptoms of hyperglycemia and hypoglycemia  - Administer ordered medications to maintain glucose within target range  - Assess nutritional intake and initiate nutrition service referral as needed  Outcome: Progressing     Problem: RESPIRATORY - ADULT  Goal: Achieves optimal ventilation and oxygenation  Description: INTERVENTIONS:  - Assess for changes in respiratory status  - Assess for changes in mentation and behavior  - Position to facilitate oxygenation and minimize respiratory effort  - Oxygen administered by appropriate delivery if ordered  - Initiate smoking cessation education as indicated  - Encourage broncho-pulmonary hygiene including cough, deep breathe, Incentive Spirometry  - Assess the need for suctioning and aspirate as needed  - Assess and instruct to report SOB or any respiratory difficulty  - Respiratory Therapy support as indicated  Outcome: Progressing

## 2022-12-15 NOTE — ASSESSMENT & PLAN NOTE
· She was initially admitted for nonhealing wound of the left lower extremity, requiring multiple endovascular interventions and ultimately underwent left AKA on 11/23/2022 and right AKA on 12/1/22  · Postoperative wound and pain management per primary service, plastic surgery  · Pain is  better controlled since her surgery    · Due to poor wound healing, vascular surgery is planning to debride the wounds in the OR tomorrow  · Eventual short-term rehab placement when medically stable

## 2022-12-15 NOTE — PLAN OF CARE
Problem: Potential for Falls  Goal: Patient will remain free of falls  Description: INTERVENTIONS:  - Educate patient/family on patient safety including physical limitations  - Instruct patient to call for assistance with activity   - Consult OT/PT to assist with strengthening/mobility   - Keep Call bell within reach  - Keep bed low and locked with side rails adjusted as appropriate  - Keep care items and personal belongings within reach  - Initiate and maintain comfort rounds  - Make Fall Risk Sign visible to staff  - Offer Toileting every 2 Hours, in advance of need  - Initiate/Maintain bed  chair alarm  - Obtain necessary fall risk management equipment: bed chair alarm, call bell, gripper sock, walker, bedside commode  - Apply yellow socks and bracelet for high fall risk patients  - Consider moving patient to room near nurses station  Outcome: Progressing     Problem: MOBILITY - ADULT  Goal: Maintain or return to baseline ADL function  Description: INTERVENTIONS:  -  Assess patient's ability to carry out ADLs; assess patient's baseline for ADL function and identify physical deficits which impact ability to perform ADLs (bathing, care of mouth/teeth, toileting, grooming, dressing, etc )  - Assess/evaluate cause of self-care deficits   - Assess range of motion  - Assess patient's mobility; develop plan if impaired  - Assess patient's need for assistive devices and provide as appropriate  - Encourage maximum independence but intervene and supervise when necessary  - Involve family in performance of ADLs  - Assess for home care needs following discharge   - Consider OT consult to assist with ADL evaluation and planning for discharge  - Provide patient education as appropriate  Outcome: Progressing  Goal: Maintains/Returns to pre admission functional level  Description: INTERVENTIONS:  - Perform BMAT or MOVE assessment daily    - Set and communicate daily mobility goal to care team and patient/family/caregiver     - Collaborate with rehabilitation services on mobility goals if consulted  - Assist patient in repositioning every 2 hours    - Dangle patient 3 times a day  - Stand patient 3 times a day  - Out of bed to chair as tolerated  - Out of bed for meals  - Out of bed for toileting  - Record patient progress and toleration of activity level   Outcome: Progressing     Problem: PAIN - ADULT  Goal: Verbalizes/displays adequate comfort level or baseline comfort level  Description: Interventions:  - Encourage patient to monitor pain and request assistance  - Assess pain using appropriate pain scale  - Administer analgesics based on type and severity of pain and evaluate response  - Implement non-pharmacological measures as appropriate and evaluate response  - Consider cultural and social influences on pain and pain management  - Notify physician/advanced practitioner if interventions unsuccessful or patient reports new pain  Outcome: Progressing     Problem: INFECTION - ADULT  Goal: Absence or prevention of progression during hospitalization  Description: INTERVENTIONS:  - Assess and monitor for signs and symptoms of infection  - Monitor lab/diagnostic results  - Monitor all insertion sites, i e  indwelling lines, tubes, and drains  - Administer medications as ordered  - Instruct and encourage patient and family to use good hand hygiene technique  Outcome: Progressing     Problem: SAFETY ADULT  Goal: Patient will remain free of falls  Description: INTERVENTIONS:  - Educate patient/family on patient safety including physical limitations  - Instruct patient to call for assistance with activity   - Consult OT/PT to assist with strengthening/mobility   - Keep Call bell within reach  - Keep bed low and locked with side rails adjusted as appropriate  - Keep care items and personal belongings within reach  - Initiate and maintain comfort rounds  - Make Fall Risk Sign visible to staff  - Offer Toileting every 2 Hours, in advance of need  - Initiate/Maintain bed  chair alarm  - Obtain necessary fall risk management equipment: bed chair alarm, call bell, gripper sock, walker, bedside commode  - Apply yellow socks and bracelet for high fall risk patients  - Consider moving patient to room near nurses station  Outcome: Progressing  Goal: Maintain or return to baseline ADL function  Description: INTERVENTIONS:  -  Assess patient's ability to carry out ADLs; assess patient's baseline for ADL function and identify physical deficits which impact ability to perform ADLs (bathing, care of mouth/teeth, toileting, grooming, dressing, etc )  - Assess/evaluate cause of self-care deficits   - Assess range of motion  - Assess patient's mobility; develop plan if impaired  - Assess patient's need for assistive devices and provide as appropriate  - Encourage maximum independence but intervene and supervise when necessary  - Involve family in performance of ADLs  - Assess for home care needs following discharge   - Consider OT consult to assist with ADL evaluation and planning for discharge  - Provide patient education as appropriate  Outcome: Progressing  Goal: Maintains/Returns to pre admission functional level  Description: INTERVENTIONS:  - Perform BMAT or MOVE assessment daily    - Set and communicate daily mobility goal to care team and patient/family/caregiver  - Collaborate with rehabilitation services on mobility goals if consulted  - Assist patient in repositioning every 2 hours    - Dangle patient 3 times a day  - Stand patient 3 times a day  - Out of bed to chair as tolerated  - Out of bed for meals  - Out of bed for toileting  - Record patient progress and toleration of activity level   Outcome: Progressing     Problem: DISCHARGE PLANNING  Goal: Discharge to home or other facility with appropriate resources  Description: INTERVENTIONS:  - Identify barriers to discharge w/patient   - Arrange for needed discharge resources and transportation as appropriate  - Identify discharge learning needs  - Refer to Case Management Department for coordinating discharge planning if the patient needs post-hospital services based on physician/advanced practitioner order   Outcome: Progressing     Problem: Knowledge Deficit  Goal: Patient/family/caregiver demonstrates understanding of disease process, treatment plan, medications, and discharge instructions  Description: Complete learning assessment and assess knowledge base    Interventions:  - Provide teaching at level of understanding  - Provide teaching via preferred learning methods  Outcome: Progressing     Problem: METABOLIC, FLUID AND ELECTROLYTES - ADULT  Goal: Electrolytes maintained within normal limits  Description: INTERVENTIONS:  - Monitor labs and assess patient for signs and symptoms of electrolyte imbalances  - Administer electrolyte replacement as ordered  - Monitor response to electrolyte replacements, including repeat lab results as appropriate  - Instruct patient on fluid and nutrition as appropriate  Outcome: Progressing  Goal: Fluid balance maintained  Description: INTERVENTIONS:  - Monitor labs   - Monitor I/O and WT  - Instruct patient on fluid and nutrition as appropriate  - Assess for signs & symptoms of volume excess or deficit  Outcome: Progressing  Goal: Glucose maintained within target range  Description: INTERVENTIONS:  - Monitor Blood Glucose as ordered  - Assess for signs and symptoms of hyperglycemia and hypoglycemia  - Administer ordered medications to maintain glucose within target range  - Assess nutritional intake and initiate nutrition service referral as needed  Outcome: Progressing     Problem: SKIN/TISSUE INTEGRITY - ADULT  Goal: Skin Integrity remains intact(Skin Breakdown Prevention)  Description: Assess:  -Perform Nicanor assessment every shift  -Clean and moisturize skin every shift  -Inspect skin when repositioning, toileting, and assisting with ADLS  -Assess under medical devices   -Assess extremities for adequate circulation and sensation     Bed Management:  -Have minimal linens on bed & keep smooth, unwrinkled  -Change linens as needed when moist or perspiring  -Avoid sitting or lying in one position for more than 2 hours while in bed    Toileting:  -Offer bedside commode  -Assess for incontinence every 2 hours  -Use incontinent care products after each incontinent episode     Activity:  -Mobilize patient 3 times a day  -Encourage or provide ROM exercises   -Turn and reposition patient every 2 Hours  -Use appropriate equipment to lift or move patient in bed  -Instruct/ Assist with weight shifting every 15 minutes when out of bed in chair  -Consider limitation of chair time 1 hour intervals    Skin Care:  -Avoid use of baby powder, tape, friction and shearing, hot water or constrictive clothing  -Relieve pressure over bony prominences using waffle cushion when in chair  -Do not massage red bony areas    Outcome: Progressing  Goal: Incision(s), wounds(s) or drain site(s) healing without S/S of infection  Description: INTERVENTIONS  - Assess and document dressing, incision, wound bed, drain sites and surrounding tissue  - Provide patient and family education  - Perform skin care/dressing changes everyday as ordered  Outcome: Progressing  Goal: Pressure injury heals and does not worsen  Description: Interventions:  - Implement low air loss mattress or specialty surface (Criteria met)  - Apply silicone foam dressing  - Instruct/assist with weight shifting every 15 minutes when in chair   - Limit chair time to 1 hour intervals  - Use special pressure reducing interventions such as waffle cushion when in chair   - Perform passive or active ROM   - Turn and reposition patient & offload bony prominences every 2 hours   - Utilize friction reducing device or surface for transfers   - Consider consults to  interdisciplinary teams such as wound care, PT/OT  - Use incontinent care products after each incontinent episode   - Consider nutrition services referral as needed  Outcome: Progressing     Problem: MUSCULOSKELETAL - ADULT  Goal: Maintain or return mobility to safest level of function  Description: INTERVENTIONS:  - Assess patient's ability to carry out ADLs; assess patient's baseline for ADL function and identify physical deficits which impact ability to perform ADLs (bathing, care of mouth/teeth, toileting, grooming, dressing, etc )  - Assess/evaluate cause of self-care deficits   - Assess range of motion  - Assess patient's mobility  - Assess patient's need for assistive devices and provide as appropriate  - Encourage maximum independence but intervene and supervise when necessary  - Involve family in performance of ADLs  - Assess for home care needs following discharge   - Consider OT consult to assist with ADL evaluation and planning for discharge  - Provide patient education as appropriate  Outcome: Progressing  Goal: Maintain proper alignment of affected body part  Description: INTERVENTIONS:  - Support, maintain and protect limb and body alignment  - Provide patient/ family with appropriate education  Outcome: Progressing     Problem: CARDIOVASCULAR - ADULT  Goal: Maintains optimal cardiac output and hemodynamic stability  Description: INTERVENTIONS:  - Monitor I/O, vital signs and rhythm  - Monitor for S/S and trends of decreased cardiac output  - Administer and titrate ordered vasoactive medications to optimize hemodynamic stability  - Assess quality of pulses, skin color and temperature  - Assess for signs of decreased coronary artery perfusion  - Instruct patient to report change in severity of symptoms  Outcome: Progressing     Problem: RESPIRATORY - ADULT  Goal: Achieves optimal ventilation and oxygenation  Description: INTERVENTIONS:  - Assess for changes in respiratory status  - Assess for changes in mentation and behavior  - Position to facilitate oxygenation and minimize respiratory effort  - Oxygen administered by appropriate delivery if ordered  - Initiate smoking cessation education as indicated  - Encourage broncho-pulmonary hygiene including cough, deep breathe, Incentive Spirometry  - Assess the need for suctioning and aspirate as needed  - Assess and instruct to report SOB or any respiratory difficulty  - Respiratory Therapy support as indicated  Outcome: Progressing     Problem: GENITOURINARY - ADULT  Goal: Maintains or returns to baseline urinary function  Description: INTERVENTIONS:  - Assess urinary function  - Encourage oral fluids to ensure adequate hydration if ordered  - Administer IV fluids as ordered to ensure adequate hydration  - Administer ordered medications as needed  - Offer frequent toileting  - Follow urinary retention protocol if ordered  Outcome: Progressing  Goal: Absence of urinary retention  Description: INTERVENTIONS:  - Assess patient’s ability to void and empty bladder  - Monitor I/O  - Bladder scan as needed  - Discuss with physician/AP medications to alleviate retention as needed  - Discuss catheterization for long term situations as appropriate  Outcome: Progressing  Goal: Urinary catheter remains patent  Description: INTERVENTIONS:  - Assess patency of urinary catheter  - If patient has a chronic pa, consider changing catheter if non-functioning  - Follow guidelines for intermittent irrigation of non-functioning urinary catheter  Outcome: Progressing     Problem: HEMATOLOGIC - ADULT  Goal: Maintains hematologic stability  Description: INTERVENTIONS  - Assess for signs and symptoms of bleeding or hemorrhage  - Monitor labs  - Administer supportive blood products/factors as ordered and appropriate  Outcome: Progressing     Problem: Prexisting or High Potential for Compromised Skin Integrity  Goal: Skin integrity is maintained or improved  Description: INTERVENTIONS:  - Identify patients at risk for skin breakdown  - Assess and monitor skin integrity  - Assess and monitor nutrition and hydration status  - Monitor labs   - Assess for incontinence   - Turn and reposition patient  - Assist with mobility/ambulation  - Relieve pressure over bony prominences  - Avoid friction and shearing  - Provide appropriate hygiene as needed including keeping skin clean and dry  - Evaluate need for skin moisturizer/barrier cream  - Collaborate with interdisciplinary team   - Patient/family teaching  - Consider wound care consult   Outcome: Progressing     Problem: Nutrition/Hydration-ADULT  Goal: Nutrient/Hydration intake appropriate for improving, restoring or maintaining nutritional needs  Description: Monitor and assess patient's nutrition/hydration status for malnutrition  Collaborate with interdisciplinary team and initiate plan and interventions as ordered  Monitor patient's weight and dietary intake as ordered or per policy  Utilize nutrition screening tool and intervene as necessary  Determine patient's food preferences and provide high-protein, high-caloric foods as appropriate       INTERVENTIONS:  - Monitor oral intake, urinary output, labs, and treatment plans  - Assess nutrition and hydration status and recommend course of action  - Evaluate amount of meals eaten  - Assist patient with eating if necessary   - Allow adequate time for meals  - Recommend/ encourage appropriate diets, oral nutritional supplements, and vitamin/mineral supplements  - Order, calculate, and assess calorie counts as needed  - Recommend, monitor, and adjust tube feedings and TPN/PPN based on assessed needs  - Assess need for intravenous fluids  - Provide specific nutrition/hydration education as appropriate  - Include patient/family/caregiver in decisions related to nutrition  Outcome: Progressing

## 2022-12-15 NOTE — ASSESSMENT & PLAN NOTE
· Notified primary service regarding anemia  · Discussed transfusion with patient and she had no objections   · Anticipate 1-2 units to be given prior to procedure

## 2022-12-15 NOTE — ASSESSMENT & PLAN NOTE
· Positive heparin antibody on 11/11/2022  · Completed 5 days of overlap with argatroban and Coumadin   · INR now therapeutic

## 2022-12-15 NOTE — PROGRESS NOTES
[address] [facility]  Progress Note - [address] [facility] [address] 1960, 65 y o  female MRN: [id number]  Unit/Bed#: E4 -01 Encounter: [id number]  Primary Care Provider: ALEX Alberto   Date and time admitted to hospital: 10/21/2022  7:58 PM    * PAD (peripheral artery disease) [facility])  Assessment & Plan  65year old female former smoker w/HTN, HLD, uncontrolled type II DM (A1c 9 8), hx CVA, presenting with nonhealing extensive L heel ulceration and R hallux and 5th digit ulceration  Vascular surgery consulted for input  S/p multiple b/l endovascular interventions  JUSTIN  R hemispheric CVA    S/p L AKA 11/23/22 [facility])  S/p R AKA, wound debridement 12/1/22 [facility])      Recommendations:  - Bilateral tissue loss in the setting of uncontrolled type II DM and NYDIA on admission, now s/p multiple angiograms w/intervention    - Endovascular interventions, c/b atheroembolic event to the RLE and JUSTIN with R hemispheric CVA  - Now s/p L AKA on 11/23/22 and R AKA 12/1/22 w/ proximal thigh wound debridements  - Left lateral AKA incision with some erythema/drainage  Started on Ancef - last dose today  - Plastic surgery input appreciated  Recommending daily santyl to R thigh wounds, we will take tomorrow for debridement possible vac pending INR   -Tentative plan [email or web address]  -Will need debridement of right thigh wound and possible vac placement - timing to be determined based on INR levels  - Continue argatroban drip per protocol will plan to bridge to Coumadin  Heme Onc note noted  -Argatroban gtt discontinued 12/12  Repeat INR yesterday 4 08, AM pending  Will determine dose pending INR today  - Medical management with statin, Plavix  - Medical management and glucose control per SLIM    - Continue oral pain regimen, adjusted to long acting which appears to be controlling patient's pain well   - Appreciate psych input and management - addition of wellbutrin and adjustment of zoloft   - Wound care orders placed       Subjective: Patient seen and examined  Sleeping comfortably  Vitals:  /68 (BP Location: Left arm)   Pulse 90   Temp 98 9 °F (37 2 °C) (Temporal)   Resp 18   Ht 5' 4" (1 626 m)   Wt 72 2 kg (159 lb 2 8 oz)   SpO2 95%   BMI 27 32 kg/m²     I/Os:  No intake/output data recorded  No intake/output data recorded  Lab Results and Cultures:   CBC with diff:   Lab Results   Component Value Date    WBC 10 76 (H) 12/14/2022    HGB 7 3 (L) 12/14/2022    HCT 24 7 (L) 12/14/2022    MCV 86 12/14/2022     12/14/2022    MCH 26 0 (L) 12/14/2022    MCHC 30 1 (L) 12/14/2022    RDW 17 5 (H) 12/14/2022    MPV 8 1 (L) 12/14/2022    NRBC 0 12/05/2022   ,   BMP/CMP:  Lab Results   Component Value Date    SODIUM 141 12/11/2022    K 3 6 12/11/2022     12/11/2022    CO2 28 12/11/2022    BUN 12 12/11/2022    CREATININE 0 80 12/11/2022    CALCIUM 8 4 12/11/2022    AST 25 12/02/2022    ALT 12 12/02/2022    ALKPHOS 318 (H) 12/02/2022    EGFR 79 12/11/2022   ,   Lipid Panel: No results found for: CHOL,   Coags:   Lab Results   Component Value Date    PTT 95 (H) 12/12/2022    INR 4 08 (H) 12/14/2022   ,     Blood Culture:   Lab Results   Component Value Date    BLOODCX No Growth After 5 Days   11/13/2022    BLOODCX Staphylococcus coagulase negative (A) 11/13/2022   ,   Urinalysis:   Lab Results   Component Value Date    COLORU Yellow 11/12/2022    CLARITYU Slightly Cloudy 11/12/2022    SPECGRAV 1 025 11/12/2022    PHUR 5 5 11/12/2022    PHUR 6 5 02/23/2018    LEUKOCYTESUR Trace (A) 11/12/2022    NITRITE Positive (A) 11/12/2022    GLUCOSEU Negative 11/12/2022    KETONESU Negative 11/12/2022    BILIRUBINUR Negative 11/12/2022    BLOODU Large (A) 11/12/2022   ,   Urine Culture:   Lab Results   Component Value Date    URINECX >100,000 cfu/ml Enterobacter cloacae complex (A) 11/12/2022    URINECX 70,000-79,000 cfu/ml Kluyveromyces marxianus (A) 11/12/2022   ,   Wound Culure: No results found for: WOUNDCULT    Medications:  Current Facility-Administered Medications   Medication Dose Route Frequency   • acetaminophen (TYLENOL) tablet 650 mg  650 mg Oral Q6H PRN   • ALPRAZolam (XANAX) tablet 0 25 mg  0 25 mg Oral Daily PRN   • aluminum-magnesium hydroxide-simethicone (MYLANTA) oral suspension 30 mL  30 mL Oral Q4H PRN   • amLODIPine (NORVASC) tablet 10 mg  10 mg Oral Daily   • atorvastatin (LIPITOR) tablet 40 mg  40 mg Oral Daily With Dinner   • buPROPion (WELLBUTRIN XL) 24 hr tablet 150 mg  150 mg Oral Daily   • ceFAZolin (ANCEF) IVPB (premix in dextrose) 2,000 mg 50 mL  2,000 mg Intravenous Q8H   • clopidogrel (PLAVIX) tablet 75 mg  75 mg Oral Daily   • collagenase (SANTYL) ointment   Topical Daily   • doxazosin (CARDURA) tablet 4 mg  4 mg Oral HS   • gabapentin (NEURONTIN) capsule 400 mg  400 mg Oral TID   • insulin glargine (LANTUS) subcutaneous injection 8 Units 0 08 mL  8 Units Subcutaneous HS   • insulin lispro (HumaLOG) 100 units/mL subcutaneous injection 1-6 Units  1-6 Units Subcutaneous TID AC   • labetalol (NORMODYNE) injection 10 mg  10 mg Intravenous Q6H PRN   • lisinopril (ZESTRIL) tablet 40 mg  40 mg Oral Daily   • methocarbamol (ROBAXIN) tablet 1,000 mg  1,000 mg Oral Q8H [address]   • metoclopramide (REGLAN) injection 10 mg  10 mg Intravenous Q6H PRN   • metoprolol succinate (TOPROL-XL) 24 hr tablet 25 mg  25 mg Oral Daily   • naloxone (NARCAN) 0 04 mg/mL syringe 0 04 mg  0 04 mg Intravenous Q1MIN PRN   • nystatin (MYCOSTATIN) powder   Topical BID   • ondansetron (ZOFRAN) injection 4 mg  4 mg Intravenous Q6H PRN   • oxyCODONE (OxyCONTIN) 12 hr tablet 10 mg  10 mg Oral Q12H MICHELLE   • oxyCODONE (ROXICODONE) IR tablet 2 5 mg  2 5 mg Oral Q4H PRN    Or   • oxyCODONE (ROXICODONE) IR tablet 5 mg  5 mg Oral Q4H PRN    Or   • oxyCODONE (ROXICODONE) IR tablet 7 5 mg  7 5 mg Oral Q4H PRN   • pantoprazole (PROTONIX) EC tablet 40 mg  40 mg Oral Early Morning   • polyethylene glycol (MIRALAX) packet 17 g  17 g Oral BID   • QUEtiapine (SEROquel) tablet 25 mg  25 mg Oral HS   • senna (SENOKOT) tablet 8 6 mg  1 tablet Oral BID   • sertraline (ZOLOFT) tablet 175 mg  175 mg Oral Daily   • [START ON 12/16/2022] sodium chloride 0 9 % infusion  100 mL/hr Intravenous Continuous       Imaging:  Reviewed    Physical Exam:    General: Alert and oriented  In no acute distress  CV: Regular rate  Respiratory: Normal effort   Abdominal: Soft, nontender     Extremities: Bilateral AKA with wound breakdown on right thigh, left stump with staples macerated skin but does not look infected  Neurologic: Grossly normal       Luis Horn MD  12/15/2022

## 2022-12-16 ENCOUNTER — ANESTHESIA (INPATIENT)
Dept: PERIOP | Facility: HOSPITAL | Age: 62
End: 2022-12-16

## 2022-12-16 LAB
ABO GROUP BLD BPU: NORMAL
BPU ID: NORMAL
CROSSMATCH: NORMAL
ERYTHROCYTE [DISTWIDTH] IN BLOOD BY AUTOMATED COUNT: 16.7 % (ref 11.6–15.1)
GLUCOSE SERPL-MCNC: 116 MG/DL (ref 65–140)
GLUCOSE SERPL-MCNC: 154 MG/DL (ref 65–140)
GLUCOSE SERPL-MCNC: 169 MG/DL (ref 65–140)
GLUCOSE SERPL-MCNC: 175 MG/DL (ref 65–140)
GLUCOSE SERPL-MCNC: 175 MG/DL (ref 65–140)
HCT VFR BLD AUTO: 26.5 % (ref 34.8–46.1)
HGB BLD-MCNC: 8.1 G/DL (ref 11.5–15.4)
INR PPP: 1.73 (ref 0.84–1.19)
MCH RBC QN AUTO: 26.3 PG (ref 26.8–34.3)
MCHC RBC AUTO-ENTMCNC: 30.6 G/DL (ref 31.4–37.4)
MCV RBC AUTO: 86 FL (ref 82–98)
PLATELET # BLD AUTO: 300 THOUSANDS/UL (ref 149–390)
PMV BLD AUTO: 8.1 FL (ref 8.9–12.7)
PROTHROMBIN TIME: 20.2 SECONDS (ref 11.6–14.5)
RBC # BLD AUTO: 3.08 MILLION/UL (ref 3.81–5.12)
UNIT DISPENSE STATUS: NORMAL
UNIT PRODUCT CODE: NORMAL
UNIT PRODUCT VOLUME: 300 ML
UNIT RH: NORMAL
WBC # BLD AUTO: 9.14 THOUSAND/UL (ref 4.31–10.16)

## 2022-12-16 PROCEDURE — 99024 POSTOP FOLLOW-UP VISIT: CPT | Performed by: PHYSICIAN ASSISTANT

## 2022-12-16 PROCEDURE — 3E10X8Z IRRIGATION OF SKIN AND MUCOUS MEMBRANES USING IRRIGATING SUBSTANCE: ICD-10-PCS | Performed by: SURGERY

## 2022-12-16 PROCEDURE — 99232 SBSQ HOSP IP/OBS MODERATE 35: CPT | Performed by: INTERNAL MEDICINE

## 2022-12-16 PROCEDURE — 97530 THERAPEUTIC ACTIVITIES: CPT

## 2022-12-16 PROCEDURE — 0HBHXZZ EXCISION OF RIGHT UPPER LEG SKIN, EXTERNAL APPROACH: ICD-10-PCS | Performed by: SURGERY

## 2022-12-16 PROCEDURE — 11045 DBRDMT SUBQ TISS EACH ADDL: CPT | Performed by: SURGERY

## 2022-12-16 PROCEDURE — 85027 COMPLETE CBC AUTOMATED: CPT | Performed by: SURGERY

## 2022-12-16 PROCEDURE — 97606 NEG PRS WND THER DME>50 SQCM: CPT | Performed by: SURGERY

## 2022-12-16 PROCEDURE — 85610 PROTHROMBIN TIME: CPT | Performed by: SURGERY

## 2022-12-16 PROCEDURE — 97597 DBRDMT OPN WND 1ST 20 CM/<: CPT | Performed by: SURGERY

## 2022-12-16 PROCEDURE — 82948 REAGENT STRIP/BLOOD GLUCOSE: CPT

## 2022-12-16 PROCEDURE — 11042 DBRDMT SUBQ TIS 1ST 20SQCM/<: CPT | Performed by: SURGERY

## 2022-12-16 RX ORDER — ONDANSETRON 2 MG/ML
4 INJECTION INTRAMUSCULAR; INTRAVENOUS ONCE AS NEEDED
Status: DISCONTINUED | OUTPATIENT
Start: 2022-12-16 | End: 2022-12-16 | Stop reason: HOSPADM

## 2022-12-16 RX ORDER — CEFAZOLIN SODIUM 2 G/50ML
2000 SOLUTION INTRAVENOUS ONCE
Status: COMPLETED | OUTPATIENT
Start: 2022-12-16 | End: 2022-12-16

## 2022-12-16 RX ORDER — HYDROMORPHONE HCL/PF 1 MG/ML
0.5 SYRINGE (ML) INJECTION
Status: DISCONTINUED | OUTPATIENT
Start: 2022-12-16 | End: 2022-12-16 | Stop reason: HOSPADM

## 2022-12-16 RX ORDER — WARFARIN SODIUM 2 MG/1
4 TABLET ORAL
Status: DISCONTINUED | OUTPATIENT
Start: 2022-12-16 | End: 2022-12-20

## 2022-12-16 RX ORDER — PROPOFOL 10 MG/ML
INJECTION, EMULSION INTRAVENOUS AS NEEDED
Status: DISCONTINUED | OUTPATIENT
Start: 2022-12-16 | End: 2022-12-16

## 2022-12-16 RX ORDER — KETAMINE HCL IN NACL, ISO-OSM 100MG/10ML
SYRINGE (ML) INJECTION AS NEEDED
Status: DISCONTINUED | OUTPATIENT
Start: 2022-12-16 | End: 2022-12-16

## 2022-12-16 RX ORDER — SODIUM CHLORIDE, SODIUM LACTATE, POTASSIUM CHLORIDE, CALCIUM CHLORIDE 600; 310; 30; 20 MG/100ML; MG/100ML; MG/100ML; MG/100ML
125 INJECTION, SOLUTION INTRAVENOUS CONTINUOUS
Status: DISCONTINUED | OUTPATIENT
Start: 2022-12-16 | End: 2022-12-16

## 2022-12-16 RX ORDER — SUCCINYLCHOLINE/SOD CL,ISO/PF 100 MG/5ML
SYRINGE (ML) INTRAVENOUS AS NEEDED
Status: DISCONTINUED | OUTPATIENT
Start: 2022-12-16 | End: 2022-12-16

## 2022-12-16 RX ORDER — MIDAZOLAM HYDROCHLORIDE 2 MG/2ML
INJECTION, SOLUTION INTRAMUSCULAR; INTRAVENOUS AS NEEDED
Status: DISCONTINUED | OUTPATIENT
Start: 2022-12-16 | End: 2022-12-16

## 2022-12-16 RX ORDER — ONDANSETRON 2 MG/ML
INJECTION INTRAMUSCULAR; INTRAVENOUS AS NEEDED
Status: DISCONTINUED | OUTPATIENT
Start: 2022-12-16 | End: 2022-12-16

## 2022-12-16 RX ORDER — LABETALOL HYDROCHLORIDE 5 MG/ML
5 INJECTION, SOLUTION INTRAVENOUS ONCE
Status: COMPLETED | OUTPATIENT
Start: 2022-12-16 | End: 2022-12-16

## 2022-12-16 RX ORDER — FENTANYL CITRATE/PF 50 MCG/ML
25 SYRINGE (ML) INJECTION
Status: DISCONTINUED | OUTPATIENT
Start: 2022-12-16 | End: 2022-12-16 | Stop reason: HOSPADM

## 2022-12-16 RX ORDER — FENTANYL CITRATE 50 UG/ML
INJECTION, SOLUTION INTRAMUSCULAR; INTRAVENOUS AS NEEDED
Status: DISCONTINUED | OUTPATIENT
Start: 2022-12-16 | End: 2022-12-16

## 2022-12-16 RX ORDER — MAGNESIUM HYDROXIDE 1200 MG/15ML
LIQUID ORAL AS NEEDED
Status: DISCONTINUED | OUTPATIENT
Start: 2022-12-16 | End: 2022-12-16 | Stop reason: HOSPADM

## 2022-12-16 RX ORDER — HYDRALAZINE HYDROCHLORIDE 20 MG/ML
10 INJECTION INTRAMUSCULAR; INTRAVENOUS ONCE
Status: COMPLETED | OUTPATIENT
Start: 2022-12-16 | End: 2022-12-16

## 2022-12-16 RX ADMIN — METHOCARBAMOL 1000 MG: 500 TABLET ORAL at 21:43

## 2022-12-16 RX ADMIN — PANTOPRAZOLE SODIUM 40 MG: 40 TABLET, DELAYED RELEASE ORAL at 05:52

## 2022-12-16 RX ADMIN — CEFAZOLIN SODIUM 2000 MG: 2 SOLUTION INTRAVENOUS at 11:10

## 2022-12-16 RX ADMIN — SENNOSIDES 8.6 MG: 8.6 TABLET, FILM COATED ORAL at 17:04

## 2022-12-16 RX ADMIN — LISINOPRIL 40 MG: 20 TABLET ORAL at 08:53

## 2022-12-16 RX ADMIN — QUETIAPINE FUMARATE 25 MG: 25 TABLET ORAL at 21:43

## 2022-12-16 RX ADMIN — GABAPENTIN 400 MG: 400 CAPSULE ORAL at 15:48

## 2022-12-16 RX ADMIN — BUPROPION HYDROCHLORIDE 150 MG: 150 TABLET, FILM COATED, EXTENDED RELEASE ORAL at 08:54

## 2022-12-16 RX ADMIN — CLOPIDOGREL BISULFATE 75 MG: 75 TABLET ORAL at 08:53

## 2022-12-16 RX ADMIN — Medication 50 MG: at 11:31

## 2022-12-16 RX ADMIN — METOPROLOL SUCCINATE 25 MG: 25 TABLET, EXTENDED RELEASE ORAL at 08:53

## 2022-12-16 RX ADMIN — FENTANYL CITRATE 50 MCG: 50 INJECTION INTRAMUSCULAR; INTRAVENOUS at 11:44

## 2022-12-16 RX ADMIN — METHOCARBAMOL 1000 MG: 500 TABLET ORAL at 05:52

## 2022-12-16 RX ADMIN — FENTANYL CITRATE 25 MCG: 50 INJECTION, SOLUTION INTRAMUSCULAR; INTRAVENOUS at 13:00

## 2022-12-16 RX ADMIN — ONDANSETRON 4 MG: 2 INJECTION INTRAMUSCULAR; INTRAVENOUS at 11:27

## 2022-12-16 RX ADMIN — LABETALOL HYDROCHLORIDE 5 MG: 5 INJECTION, SOLUTION INTRAVENOUS at 13:08

## 2022-12-16 RX ADMIN — CEFAZOLIN SODIUM 2000 MG: 2 SOLUTION INTRAVENOUS at 04:10

## 2022-12-16 RX ADMIN — SERTRALINE HYDROCHLORIDE 175 MG: 100 TABLET ORAL at 08:53

## 2022-12-16 RX ADMIN — Medication 100 MG: at 11:22

## 2022-12-16 RX ADMIN — INSULIN LISPRO 1 UNITS: 100 INJECTION, SOLUTION INTRAVENOUS; SUBCUTANEOUS at 15:50

## 2022-12-16 RX ADMIN — PROPOFOL 150 MG: 10 INJECTION, EMULSION INTRAVENOUS at 11:22

## 2022-12-16 RX ADMIN — WARFARIN SODIUM 4 MG: 2 TABLET ORAL at 17:04

## 2022-12-16 RX ADMIN — GABAPENTIN 400 MG: 400 CAPSULE ORAL at 08:53

## 2022-12-16 RX ADMIN — HYDRALAZINE HYDROCHLORIDE 10 MG: 20 INJECTION, SOLUTION INTRAMUSCULAR; INTRAVENOUS at 13:15

## 2022-12-16 RX ADMIN — MIDAZOLAM 2 MG: 1 INJECTION INTRAMUSCULAR; INTRAVENOUS at 11:11

## 2022-12-16 RX ADMIN — DOXAZOSIN 4 MG: 4 TABLET ORAL at 21:44

## 2022-12-16 RX ADMIN — OXYCODONE HYDROCHLORIDE 10 MG: 10 TABLET, FILM COATED, EXTENDED RELEASE ORAL at 21:43

## 2022-12-16 RX ADMIN — OXYCODONE HYDROCHLORIDE 10 MG: 10 TABLET, FILM COATED, EXTENDED RELEASE ORAL at 08:54

## 2022-12-16 RX ADMIN — AMLODIPINE BESYLATE 10 MG: 10 TABLET ORAL at 08:53

## 2022-12-16 RX ADMIN — SODIUM CHLORIDE, POTASSIUM CHLORIDE, SODIUM LACTATE AND CALCIUM CHLORIDE 125 ML/HR: 600; 310; 30; 20 INJECTION, SOLUTION INTRAVENOUS at 07:03

## 2022-12-16 RX ADMIN — NYSTATIN: 100000 POWDER TOPICAL at 08:54

## 2022-12-16 RX ADMIN — METHOCARBAMOL 1000 MG: 500 TABLET ORAL at 14:44

## 2022-12-16 RX ADMIN — FENTANYL CITRATE 50 MCG: 50 INJECTION INTRAMUSCULAR; INTRAVENOUS at 12:04

## 2022-12-16 RX ADMIN — ATORVASTATIN CALCIUM 40 MG: 40 TABLET, FILM COATED ORAL at 15:48

## 2022-12-16 RX ADMIN — FENTANYL CITRATE 25 MCG: 50 INJECTION, SOLUTION INTRAMUSCULAR; INTRAVENOUS at 12:55

## 2022-12-16 RX ADMIN — COLLAGENASE SANTYL: 250 OINTMENT TOPICAL at 08:54

## 2022-12-16 RX ADMIN — GABAPENTIN 400 MG: 400 CAPSULE ORAL at 21:43

## 2022-12-16 RX ADMIN — INSULIN GLARGINE 8 UNITS: 100 INJECTION, SOLUTION SUBCUTANEOUS at 21:43

## 2022-12-16 RX ADMIN — OXYCODONE 7.5 MG: 5 TABLET ORAL at 15:48

## 2022-12-16 NOTE — ASSESSMENT & PLAN NOTE
· She was initially admitted for nonhealing wound of the left lower extremity, requiring multiple endovascular interventions and ultimately underwent left AKA on 11/23/2022 and right AKA on 12/1/22  · S/p right stump debridement and vac placement today  · Continue pain control  · Eventual short-term rehab placement when medically stable  · Management per primary service

## 2022-12-16 NOTE — PLAN OF CARE
Problem: Potential for Falls  Goal: Patient will remain free of falls  Description: INTERVENTIONS:  - Educate patient/family on patient safety including physical limitations  - Instruct patient to call for assistance with activity   - Consult OT/PT to assist with strengthening/mobility   - Keep Call bell within reach  - Keep bed low and locked with side rails adjusted as appropriate  - Keep care items and personal belongings within reach  - Initiate and maintain comfort rounds  - Make Fall Risk Sign visible to staff  - Offer Toileting every 2 Hours, in advance of need  - Initiate/Maintain bed  chair alarm  - Obtain necessary fall risk management equipment: bed chair alarm, call bell, gripper sock, walker, bedside commode  - Apply yellow socks and bracelet for high fall risk patients  - Consider moving patient to room near nurses station  Outcome: Progressing     Problem: MOBILITY - ADULT  Goal: Maintain or return to baseline ADL function  Description: INTERVENTIONS:  -  Assess patient's ability to carry out ADLs; assess patient's baseline for ADL function and identify physical deficits which impact ability to perform ADLs (bathing, care of mouth/teeth, toileting, grooming, dressing, etc )  - Assess/evaluate cause of self-care deficits   - Assess range of motion  - Assess patient's mobility; develop plan if impaired  - Assess patient's need for assistive devices and provide as appropriate  - Encourage maximum independence but intervene and supervise when necessary  - Involve family in performance of ADLs  - Assess for home care needs following discharge   - Consider OT consult to assist with ADL evaluation and planning for discharge  - Provide patient education as appropriate  Outcome: Progressing  Goal: Maintains/Returns to pre admission functional level  Description: INTERVENTIONS:  - Perform BMAT or MOVE assessment daily    - Set and communicate daily mobility goal to care team and patient/family/caregiver     - Collaborate with rehabilitation services on mobility goals if consulted  - Assist patient in repositioning every 2 hours    - Dangle patient 3 times a day  - Stand patient 3 times a day  - Out of bed to chair as tolerated  - Out of bed for meals  - Out of bed for toileting  - Record patient progress and toleration of activity level   Outcome: Progressing     Problem: PAIN - ADULT  Goal: Verbalizes/displays adequate comfort level or baseline comfort level  Description: Interventions:  - Encourage patient to monitor pain and request assistance  - Assess pain using appropriate pain scale  - Administer analgesics based on type and severity of pain and evaluate response  - Implement non-pharmacological measures as appropriate and evaluate response  - Consider cultural and social influences on pain and pain management  - Notify physician/advanced practitioner if interventions unsuccessful or patient reports new pain  Outcome: Progressing     Problem: INFECTION - ADULT  Goal: Absence or prevention of progression during hospitalization  Description: INTERVENTIONS:  - Assess and monitor for signs and symptoms of infection  - Monitor lab/diagnostic results  - Monitor all insertion sites, i e  indwelling lines, tubes, and drains  - Administer medications as ordered  - Instruct and encourage patient and family to use good hand hygiene technique  Outcome: Progressing     Problem: SAFETY ADULT  Goal: Patient will remain free of falls  Description: INTERVENTIONS:  - Educate patient/family on patient safety including physical limitations  - Instruct patient to call for assistance with activity   - Consult OT/PT to assist with strengthening/mobility   - Keep Call bell within reach  - Keep bed low and locked with side rails adjusted as appropriate  - Keep care items and personal belongings within reach  - Initiate and maintain comfort rounds  - Make Fall Risk Sign visible to staff  - Offer Toileting every 2 Hours, in advance of need  - Initiate/Maintain bed  chair alarm  - Obtain necessary fall risk management equipment: bed chair alarm, call bell, gripper sock, walker, bedside commode  - Apply yellow socks and bracelet for high fall risk patients  - Consider moving patient to room near nurses station  Outcome: Progressing  Goal: Maintain or return to baseline ADL function  Description: INTERVENTIONS:  -  Assess patient's ability to carry out ADLs; assess patient's baseline for ADL function and identify physical deficits which impact ability to perform ADLs (bathing, care of mouth/teeth, toileting, grooming, dressing, etc )  - Assess/evaluate cause of self-care deficits   - Assess range of motion  - Assess patient's mobility; develop plan if impaired  - Assess patient's need for assistive devices and provide as appropriate  - Encourage maximum independence but intervene and supervise when necessary  - Involve family in performance of ADLs  - Assess for home care needs following discharge   - Consider OT consult to assist with ADL evaluation and planning for discharge  - Provide patient education as appropriate  Outcome: Progressing  Goal: Maintains/Returns to pre admission functional level  Description: INTERVENTIONS:  - Perform BMAT or MOVE assessment daily    - Set and communicate daily mobility goal to care team and patient/family/caregiver  - Collaborate with rehabilitation services on mobility goals if consulted  - Assist patient in repositioning every 2 hours    - Dangle patient 3 times a day  - Stand patient 3 times a day  - Out of bed to chair as tolerated  - Out of bed for meals  - Out of bed for toileting  - Record patient progress and toleration of activity level   Outcome: Progressing     Problem: DISCHARGE PLANNING  Goal: Discharge to home or other facility with appropriate resources  Description: INTERVENTIONS:  - Identify barriers to discharge w/patient   - Arrange for needed discharge resources and transportation as appropriate  - Identify discharge learning needs  - Refer to Case Management Department for coordinating discharge planning if the patient needs post-hospital services based on physician/advanced practitioner order   Outcome: Progressing     Problem: Knowledge Deficit  Goal: Patient/family/caregiver demonstrates understanding of disease process, treatment plan, medications, and discharge instructions  Description: Complete learning assessment and assess knowledge base    Interventions:  - Provide teaching at level of understanding  - Provide teaching via preferred learning methods  Outcome: Progressing     Problem: METABOLIC, FLUID AND ELECTROLYTES - ADULT  Goal: Electrolytes maintained within normal limits  Description: INTERVENTIONS:  - Monitor labs and assess patient for signs and symptoms of electrolyte imbalances  - Administer electrolyte replacement as ordered  - Monitor response to electrolyte replacements, including repeat lab results as appropriate  - Instruct patient on fluid and nutrition as appropriate  Outcome: Progressing  Goal: Fluid balance maintained  Description: INTERVENTIONS:  - Monitor labs   - Monitor I/O and WT  - Instruct patient on fluid and nutrition as appropriate  - Assess for signs & symptoms of volume excess or deficit  Outcome: Progressing  Goal: Glucose maintained within target range  Description: INTERVENTIONS:  - Monitor Blood Glucose as ordered  - Assess for signs and symptoms of hyperglycemia and hypoglycemia  - Administer ordered medications to maintain glucose within target range  - Assess nutritional intake and initiate nutrition service referral as needed  Outcome: Progressing     Problem: SKIN/TISSUE INTEGRITY - ADULT  Goal: Skin Integrity remains intact(Skin Breakdown Prevention)  Description: Assess:  -Perform Nicanor assessment every shift  -Clean and moisturize skin every shift  -Inspect skin when repositioning, toileting, and assisting with ADLS  -Assess under medical devices   -Assess extremities for adequate circulation and sensation     Bed Management:  -Have minimal linens on bed & keep smooth, unwrinkled  -Change linens as needed when moist or perspiring  -Avoid sitting or lying in one position for more than 2 hours while in bed    Toileting:  -Offer bedside commode  -Assess for incontinence every 2 hours  -Use incontinent care products after each incontinent episode     Activity:  -Mobilize patient 3 times a day  -Encourage or provide ROM exercises   -Turn and reposition patient every 2 Hours  -Use appropriate equipment to lift or move patient in bed  -Instruct/ Assist with weight shifting every 15 minutes when out of bed in chair  -Consider limitation of chair time 1 hour intervals    Skin Care:  -Avoid use of baby powder, tape, friction and shearing, hot water or constrictive clothing  -Relieve pressure over bony prominences using waffle cushion when in chair  -Do not massage red bony areas    Outcome: Progressing  Goal: Incision(s), wounds(s) or drain site(s) healing without S/S of infection  Description: INTERVENTIONS  - Assess and document dressing, incision, wound bed, drain sites and surrounding tissue  - Provide patient and family education  - Perform skin care/dressing changes everyday as ordered  Outcome: Progressing  Goal: Pressure injury heals and does not worsen  Description: Interventions:  - Implement low air loss mattress or specialty surface (Criteria met)  - Apply silicone foam dressing  - Instruct/assist with weight shifting every 15 minutes when in chair   - Limit chair time to 1 hour intervals  - Use special pressure reducing interventions such as waffle cushion when in chair   - Perform passive or active ROM   - Turn and reposition patient & offload bony prominences every 2 hours   - Utilize friction reducing device or surface for transfers   - Consider consults to  interdisciplinary teams such as wound care, PT/OT  - Use incontinent care products after each incontinent episode   - Consider nutrition services referral as needed  Outcome: Progressing     Problem: MUSCULOSKELETAL - ADULT  Goal: Maintain or return mobility to safest level of function  Description: INTERVENTIONS:  - Assess patient's ability to carry out ADLs; assess patient's baseline for ADL function and identify physical deficits which impact ability to perform ADLs (bathing, care of mouth/teeth, toileting, grooming, dressing, etc )  - Assess/evaluate cause of self-care deficits   - Assess range of motion  - Assess patient's mobility  - Assess patient's need for assistive devices and provide as appropriate  - Encourage maximum independence but intervene and supervise when necessary  - Involve family in performance of ADLs  - Assess for home care needs following discharge   - Consider OT consult to assist with ADL evaluation and planning for discharge  - Provide patient education as appropriate  Outcome: Progressing  Goal: Maintain proper alignment of affected body part  Description: INTERVENTIONS:  - Support, maintain and protect limb and body alignment  - Provide patient/ family with appropriate education  Outcome: Progressing     Problem: CARDIOVASCULAR - ADULT  Goal: Maintains optimal cardiac output and hemodynamic stability  Description: INTERVENTIONS:  - Monitor I/O, vital signs and rhythm  - Monitor for S/S and trends of decreased cardiac output  - Administer and titrate ordered vasoactive medications to optimize hemodynamic stability  - Assess quality of pulses, skin color and temperature  - Assess for signs of decreased coronary artery perfusion  - Instruct patient to report change in severity of symptoms  Outcome: Progressing     Problem: RESPIRATORY - ADULT  Goal: Achieves optimal ventilation and oxygenation  Description: INTERVENTIONS:  - Assess for changes in respiratory status  - Assess for changes in mentation and behavior  - Position to facilitate oxygenation and minimize respiratory effort  - Oxygen administered by appropriate delivery if ordered  - Initiate smoking cessation education as indicated  - Encourage broncho-pulmonary hygiene including cough, deep breathe, Incentive Spirometry  - Assess the need for suctioning and aspirate as needed  - Assess and instruct to report SOB or any respiratory difficulty  - Respiratory Therapy support as indicated  Outcome: Progressing     Problem: GENITOURINARY - ADULT  Goal: Maintains or returns to baseline urinary function  Description: INTERVENTIONS:  - Assess urinary function  - Encourage oral fluids to ensure adequate hydration if ordered  - Administer IV fluids as ordered to ensure adequate hydration  - Administer ordered medications as needed  - Offer frequent toileting  - Follow urinary retention protocol if ordered  Outcome: Progressing  Goal: Absence of urinary retention  Description: INTERVENTIONS:  - Assess patient’s ability to void and empty bladder  - Monitor I/O  - Bladder scan as needed  - Discuss with physician/AP medications to alleviate retention as needed  - Discuss catheterization for long term situations as appropriate  Outcome: Progressing  Goal: Urinary catheter remains patent  Description: INTERVENTIONS:  - Assess patency of urinary catheter  - If patient has a chronic pa, consider changing catheter if non-functioning  - Follow guidelines for intermittent irrigation of non-functioning urinary catheter  Outcome: Progressing

## 2022-12-16 NOTE — ASSESSMENT & PLAN NOTE
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

## 2022-12-16 NOTE — PLAN OF CARE
Problem: PHYSICAL THERAPY ADULT  Goal: Performs mobility at highest level of function for planned discharge setting  See evaluation for individualized goals  Description: Treatment/Interventions: Functional transfer training, LE strengthening/ROM, Therapeutic exercise, Endurance training, Patient/family training, Equipment eval/education, Bed mobility, Compensatory technique education, Gait training, Continued evaluation, Spoke to nursing, Spoke to MD, Spoke to case management, OT          See flowsheet documentation for full assessment, interventions and recommendations  Outcome: Progressing  Note: Prognosis: Guarded  Problem List: Decreased strength, Decreased range of motion, Decreased mobility, Decreased skin integrity, Obesity, Decreased cognition  Assessment: Pt  participated in therapy with encouragement  pt  noted to be very guarded rolling to the R side and needed increased assist compared to rolling to the left  pt  needed cues and assist to reach bedrails for bed mobility with HOB flat  Pt  reported increased pain while rolling to the R  Placed bed pan for patient twice per her request however did not have a BM  Pt  able to perform AROM of BLEs in supine however assistance provided to achieve improved ROM  Pt  noted with increased guarding during mobility  pt  reported increased pain of 8/10 in RLE post session and RN was informed of the same post session  Will continue to follow per PT POC  Barriers to Discharge: None  Barriers to Discharge Comments: alone  PT Discharge Recommendation: Post acute rehabilitation services    See flowsheet documentation for full assessment

## 2022-12-16 NOTE — ASSESSMENT & PLAN NOTE
65year old female former smoker w/HTN, HLD, uncontrolled type II DM (A1c 9 8), hx CVA, presenting with nonhealing extensive L heel ulceration and R hallux and 5th digit ulceration  Vascular surgery consulted for input     - Bilateral tissue loss in the setting of uncontrolled type II DM and NYDIA on admission, now s/p multiple angiograms w/intervention    - Endovascular interventions, c/b atheroembolic event to the RLE and JUSTIN with R hemispheric CVA    S/p L AKA 11/23/22 [facility])  S/p R AKA, wound debridement 12/1/22 ([facility])  S/p 12/16 R AKA washout and vac ([facility])    Recommendations:  - Plastic surgery recommended daily santyl to R thigh wounds  - Continue warfarin, plavix, statin (INR sub-therapeutic at 1 88 on 12/17)  - Medical management and glucose control per SLIM    - Continue oral pain regimen, adjusted to long acting which appears to be controlling patient's pain well   - Appreciate psych input and management (on Wellbutrin and Zoloft)  - plan for OR washout and Vac change Monday, 12/19

## 2022-12-16 NOTE — ASSESSMENT & PLAN NOTE
Evaluated by Psychiatry x 2 on this admission  ·  continue Zoloft 175 mg daily, Seroquel 25 mg at bedtime  Informed mom that her msg was received and forwarded to Dr Louise and that she would respond as soon as she could. Mom stated she needed to know if the meds were sent in. Informed mom that I did not see that anything was sent in today. Mom stated that she guess pt will just have to have seizures again tonight and disconnected the call.

## 2022-12-16 NOTE — ASSESSMENT & PLAN NOTE
· Positive heparin antibody on 11/11/2022  · Completed 5 days of overlap with argatroban and Coumadin   · Resume coumadin tonight  · Goal INR 2-3

## 2022-12-16 NOTE — OCCUPATIONAL THERAPY NOTE
Occupational Therapy Cancellation Note        Patient Name: Lionel Mayes  [id number] Date: 12/16/2022    Pt currently in the OR for debridement, will continue to follow as appropriate to address OT POC      Belkys Skaggs MS, OTR/L

## 2022-12-16 NOTE — PLAN OF CARE
Problem: Potential for Falls  Goal: Patient will remain free of falls  Description: INTERVENTIONS:  - Educate patient/family on patient safety including physical limitations  - Instruct patient to call for assistance with activity   - Consult OT/PT to assist with strengthening/mobility   - Keep Call bell within reach  - Keep bed low and locked with side rails adjusted as appropriate  - Keep care items and personal belongings within reach  - Initiate and maintain comfort rounds  - Make Fall Risk Sign visible to staff  - Offer Toileting every 2 Hours, in advance of need  - Initiate/Maintain bed  chair alarm  - Obtain necessary fall risk management equipment: bed chair alarm, call bell, gripper sock, walker, bedside commode  - Apply yellow socks and bracelet for high fall risk patients  - Consider moving patient to room near nurses station  Outcome: Progressing     Problem: PAIN - ADULT  Goal: Verbalizes/displays adequate comfort level or baseline comfort level  Description: Interventions:  - Encourage patient to monitor pain and request assistance  - Assess pain using appropriate pain scale  - Administer analgesics based on type and severity of pain and evaluate response  - Implement non-pharmacological measures as appropriate and evaluate response  - Consider cultural and social influences on pain and pain management  - Notify physician/advanced practitioner if interventions unsuccessful or patient reports new pain  Outcome: Progressing     Problem: SAFETY ADULT  Goal: Patient will remain free of falls  Description: INTERVENTIONS:  - Educate patient/family on patient safety including physical limitations  - Instruct patient to call for assistance with activity   - Consult OT/PT to assist with strengthening/mobility   - Keep Call bell within reach  - Keep bed low and locked with side rails adjusted as appropriate  - Keep care items and personal belongings within reach  - Initiate and maintain comfort rounds  - Make Fall Risk Sign visible to staff  - Offer Toileting every 2 Hours, in advance of need  - Initiate/Maintain bed  chair alarm  - Obtain necessary fall risk management equipment: bed chair alarm, call bell, gripper sock, walker, bedside commode  - Apply yellow socks and bracelet for high fall risk patients  - Consider moving patient to room near nurses station  Outcome: Progressing     Problem: RESPIRATORY - ADULT  Goal: Achieves optimal ventilation and oxygenation  Description: INTERVENTIONS:  - Assess for changes in respiratory status  - Assess for changes in mentation and behavior  - Position to facilitate oxygenation and minimize respiratory effort  - Oxygen administered by appropriate delivery if ordered  - Initiate smoking cessation education as indicated  - Encourage broncho-pulmonary hygiene including cough, deep breathe, Incentive Spirometry  - Assess the need for suctioning and aspirate as needed  - Assess and instruct to report SOB or any respiratory difficulty  - Respiratory Therapy support as indicated  Outcome: Progressing     Problem: HEMATOLOGIC - ADULT  Goal: Maintains hematologic stability  Description: INTERVENTIONS  - Assess for signs and symptoms of bleeding or hemorrhage  - Monitor labs  - Administer supportive blood products/factors as ordered and appropriate  Outcome: Progressing

## 2022-12-16 NOTE — PROGRESS NOTES
[facility] [facility]  Progress Note - [address] 1960, 65 y o  female MRN: [id number]  Unit/Bed#: E4 -01 Encounter: [id number]  Primary Care Provider: ALEX Son   Date and time admitted to hospital: 10/21/2022  7:58 PM    * PAD (peripheral artery disease) [facility])  Assessment & Plan  65year old female former smoker w/HTN, HLD, uncontrolled type II DM (A1c 9 8), hx CVA, presenting with nonhealing extensive L heel ulceration and R hallux and 5th digit ulceration  Vascular surgery consulted for input  S/p multiple b/l endovascular interventions  JUSTIN  R hemispheric CVA    S/p L AKA 11/23/22 [facility])  S/p R AKA, wound debridement 12/1/22 ([facility])  S/p 12/16 R AKA washout and vac--[facility]      Recommendations:  - Bilateral tissue loss in the setting of uncontrolled type II DM and NYDIA on admission, now s/p multiple angiograms w/intervention    - Endovascular interventions, c/b atheroembolic event to the RLE and JUSTIN with R hemispheric CVA  - Now s/p L AKA on 11/23/22 and R AKA 12/1/22 w/ proximal thigh wound debridements  - Plastic surgery input appreciated  Recommending daily santyl to R thigh wounds  - On coumadin, noted to be subtherapeutic today, however will adjust coumadin dose accordingly  D/w pharmacy, will give 4mg coumadin tonight and recheck INR tomorrow AM  - Medical management with statin, Plavix  - Medical management and glucose control per SLIM  - Continue oral pain regimen, adjusted to long acting which appears to be controlling patient's pain well   - Appreciate psych input and management - addition of wellbutrin and adjustment of zoloft   -plan for OR washout and Vac change Monday the 19th        Subjective: No acute events overnight    INR: 1 88 from 1 73    Vitals:  /66 (BP Location: Left arm)   Pulse 88   Temp 98 °F (36 7 °C) (Temporal)   Resp 18   Ht 5' 4" (1 626 m)   Wt 72 3 kg (159 lb 6 3 oz)   SpO2 99%   BMI 27 36 kg/m²     I/Os:  I/O last 3 completed shifts: In: 710 [P O :360; Blood:350]  Out: 601 [Urine:601]  I/O this shift:  In: 1949 [I V :1899; IV Piggyback:50]  Out: 10 [Blood:10]    Lab Results and Cultures:   CBC with diff:   Lab Results   Component Value Date    WBC 9 14 12/16/2022    HGB 8 1 (L) 12/16/2022    HCT 26 5 (L) 12/16/2022    MCV 86 12/16/2022     12/16/2022    MCH 26 3 (L) 12/16/2022    MCHC 30 6 (L) 12/16/2022    RDW 16 7 (H) 12/16/2022    MPV 8 1 (L) 12/16/2022    NRBC 0 12/05/2022   ,   BMP/CMP:  Lab Results   Component Value Date    SODIUM 141 12/11/2022    K 3 6 12/11/2022     12/11/2022    CO2 28 12/11/2022    BUN 12 12/11/2022    CREATININE 0 80 12/11/2022    CALCIUM 8 4 12/11/2022    AST 25 12/02/2022    ALT 12 12/02/2022    ALKPHOS 318 (H) 12/02/2022    EGFR 79 12/11/2022   ,   Lipid Panel: No results found for: CHOL,   Coags:   Lab Results   Component Value Date    PTT 95 (H) 12/12/2022    INR 1 73 (H) 12/16/2022   ,     Blood Culture:   Lab Results   Component Value Date    BLOODCX No Growth After 5 Days   11/13/2022    BLOODCX Staphylococcus coagulase negative (A) 11/13/2022   ,   Urinalysis:   Lab Results   Component Value Date    COLORU Yellow 11/12/2022    CLARITYU Slightly Cloudy 11/12/2022    SPECGRAV 1 025 11/12/2022    PHUR 5 5 11/12/2022    PHUR 6 5 02/23/2018    LEUKOCYTESUR Trace (A) 11/12/2022    NITRITE Positive (A) 11/12/2022    GLUCOSEU Negative 11/12/2022    KETONESU Negative 11/12/2022    BILIRUBINUR Negative 11/12/2022    BLOODU Large (A) 11/12/2022   ,   Urine Culture:   Lab Results   Component Value Date    URINECX >100,000 cfu/ml Enterobacter cloacae complex (A) 11/12/2022    URINECX 70,000-79,000 cfu/ml Kluyveromyces marxianus (A) 11/12/2022   ,   Wound Culure: No results found for: WOUNDCULT    Medications:  Current Facility-Administered Medications   Medication Dose Route Frequency   • acetaminophen (TYLENOL) tablet 650 mg  650 mg Oral Q6H PRN   • ALPRAZolam (XANAX) tablet 0 25 mg 0 25 mg Oral Daily PRN   • aluminum-magnesium hydroxide-simethicone (MYLANTA) oral suspension 30 mL  30 mL Oral Q4H PRN   • amLODIPine (NORVASC) tablet 10 mg  10 mg Oral Daily   • atorvastatin (LIPITOR) tablet 40 mg  40 mg Oral Daily With Dinner   • buPROPion (WELLBUTRIN XL) 24 hr tablet 150 mg  150 mg Oral Daily   • clopidogrel (PLAVIX) tablet 75 mg  75 mg Oral Daily   • collagenase (SANTYL) ointment   Topical Daily   • doxazosin (CARDURA) tablet 4 mg  4 mg Oral HS   • gabapentin (NEURONTIN) capsule 400 mg  400 mg Oral TID   • insulin glargine (LANTUS) subcutaneous injection 8 Units 0 08 mL  8 Units Subcutaneous HS   • insulin lispro (HumaLOG) 100 units/mL subcutaneous injection 1-6 Units  1-6 Units Subcutaneous TID AC   • labetalol (NORMODYNE) injection 10 mg  10 mg Intravenous Q6H PRN   • lactated ringers infusion  125 mL/hr Intravenous Continuous   • lisinopril (ZESTRIL) tablet 40 mg  40 mg Oral Daily   • methocarbamol (ROBAXIN) tablet 1,000 mg  1,000 mg Oral Q8H [address]   • metoclopramide (REGLAN) injection 10 mg  10 mg Intravenous Q6H PRN   • metoprolol succinate (TOPROL-XL) 24 hr tablet 25 mg  25 mg Oral Daily   • naloxone (NARCAN) 0 04 mg/mL syringe 0 04 mg  0 04 mg Intravenous Q1MIN PRN   • nystatin (MYCOSTATIN) powder   Topical BID   • ondansetron (ZOFRAN) injection 4 mg  4 mg Intravenous Q6H PRN   • oxyCODONE (OxyCONTIN) 12 hr tablet 10 mg  10 mg Oral Q12H MICHELLE   • oxyCODONE (ROXICODONE) IR tablet 2 5 mg  2 5 mg Oral Q4H PRN    Or   • oxyCODONE (ROXICODONE) IR tablet 5 mg  5 mg Oral Q4H PRN    Or   • oxyCODONE (ROXICODONE) IR tablet 7 5 mg  7 5 mg Oral Q4H PRN   • pantoprazole (PROTONIX) EC tablet 40 mg  40 mg Oral Early Morning   • polyethylene glycol (MIRALAX) packet 17 g  17 g Oral BID   • QUEtiapine (SEROquel) tablet 25 mg  25 mg Oral HS   • senna (SENOKOT) tablet 8 6 mg  1 tablet Oral BID   • sertraline (ZOLOFT) tablet 175 mg  175 mg Oral Daily   • warfarin (COUMADIN) tablet 4 mg  4 mg Oral Daily (warfarin)       Imaging:  Reviewed  Physical Exam  Vitals reviewed  Constitutional:       General: She is not in acute distress  Appearance: She is not ill-appearing, toxic-appearing or diaphoretic  HENT:      Head: Normocephalic and atraumatic  Pulmonary:      Effort: Pulmonary effort is normal    Musculoskeletal:      Comments: Wound vac in place on the R with good suction      Right Lower Extremity: Right leg is amputated above knee  Left Lower Extremity: Left leg is amputated above knee               Amy Bay MD  12/16/2022

## 2022-12-16 NOTE — PROGRESS NOTES
[address] [facility]  Progress Note - Jacqulin Mater 1960, 65 y o  female MRN: [id number]  Unit/Bed#: E4 -01 Encounter: [id number]  Primary Care Provider: ALEX Morocho   Date and time admitted to hospital: 10/21/2022  7:58 PM    * PAD (peripheral artery disease) ([address] )  Assessment & Plan  · She was initially admitted for nonhealing wound of the left lower extremity, requiring multiple endovascular interventions and ultimately underwent left AKA on 11/23/2022 and right AKA on 12/1/22  · S/p right stump debridement and vac placement today  · Continue pain control  · Eventual short-term rehab placement when medically stable  · Management per primary service    Stroke [facility])  Assessment & Plan  · Noted to have change in mental status on 11/13/2022    · CT confirmed moderate right parietal lobe stroke, likely embolic in the setting of HIT with thrombosis  · She was treated with argatroban/coumadin bridge  · Resume coumadin tonight   · Goal INR 2-3    HIT (heparin-induced thrombocytopenia)  Assessment & Plan  · Positive heparin antibody on 11/11/2022  · Completed 5 days of overlap with argatroban and Coumadin   · Resume coumadin tonight  · Goal INR 2-3    Type 2 diabetes mellitus with hyperglycemia, with long-term current use of insulin [facility])  Assessment & Plan  Lab Results   Component Value Date    HGBA1C 9 8 (H) 10/25/2022     Recent Labs     12/15/22  2057 12/16/22  0737 12/16/22  1242 12/16/22  1352   POCGLU 177* 169* 154* 175*     Significantly reduced regimen from home due to poor oral intake  Goal sugar < 180  If still not at goal, will increase  Lantus to 10 units nightly   For now keep at 8 units since she will have surgery tomorrow  Continue sliding scale  Adjust  insulin regimen as needed     Acute on chronic anemia  Assessment & Plan  · S/p 1 unit prbc on 12/15/22  · Monitor    Benign essential hypertension  Assessment & Plan  Continue amlodipine 10 mg daily, doxazosin 4 mg at bedtime, metoprolol 25 mg daily, lisinopril 40 mg daily with hold parameters    Depression, recurrent (HCC)  Assessment & Plan  Evaluated by Psychiatry x 2 on this admission  ·  continue Zoloft 175 mg daily, Seroquel 25 mg at bedtime  VTE Pharmacologic Prophylaxis:   Pharmacologic: Warfarin (Coumadin)  Mechanical VTE Prophylaxis in Place: No    Time Spent for Care: 20 minutes  More than 50% of total time spent on counseling and coordination of care as described above  Current Length of Stay: 55 day(s)    Current Patient Status: Inpatient     Code Status: Level 1 - Full Code      Subjective:   S/P surgery  Today  + Post op pain  NO nausea/vomiting  NO SOB    Objective:     Vitals:   Temp (24hrs), Av °F (36 7 °C), Min:97 8 °F (36 6 °C), Max:98 2 °F (36 8 °C)    Temp:  [97 8 °F (36 6 °C)-98 2 °F (36 8 °C)] 98 °F (36 7 °C)  HR:  [80-93] 88  Resp:  [17-21] 18  BP: (140-195)/() 149/66  SpO2:  [92 %-99 %] 99 %  Body mass index is 27 36 kg/m²  Input and Output Summary (last 24 hours): Intake/Output Summary (Last 24 hours) at 2022 1557  Last data filed at 2022 1218  Gross per 24 hour   Intake 2359 ml   Output 161 ml   Net 2198 ml       Physical Exam:     Physical Exam  Constitutional:       Appearance: She is not ill-appearing or diaphoretic  HENT:      Head: Normocephalic and atraumatic  Nose: No congestion or rhinorrhea  Eyes:      General: No scleral icterus  Cardiovascular:      Rate and Rhythm: Regular rhythm  Pulmonary:      Effort: Pulmonary effort is normal  No respiratory distress  Breath sounds: No wheezing or rales  Abdominal:      General: Abdomen is flat  There is no distension  Palpations: Abdomen is soft  Tenderness: There is no abdominal tenderness  Musculoskeletal:         General: No deformity  Cervical back: Neck supple  Comments: Right stump wound vac   Skin:     General: Skin is warm and dry        Coloration: Skin is not jaundiced  Neurological:      Mental Status: She is alert and oriented to person, place, and time  Psychiatric:         Mood and Affect: Mood normal          Behavior: Behavior normal      Additional Data:     Labs:    Results from last 7 days   Lab Units 12/16/22  0557   WBC Thousand/uL 9 14   HEMOGLOBIN g/dL 8 1*   HEMATOCRIT % 26 5*   PLATELETS Thousands/uL 300     Results from last 7 days   Lab Units 12/11/22  0533   SODIUM mmol/L 141   POTASSIUM mmol/L 3 6   CHLORIDE mmol/L 105   CO2 mmol/L 28   BUN mg/dL 12   CREATININE mg/dL 0 80   ANION GAP mmol/L 8   CALCIUM mg/dL 8 4   GLUCOSE RANDOM mg/dL 150*     Results from last 7 days   Lab Units 12/16/22  0557   INR  1 73*     Results from last 7 days   Lab Units 12/16/22  1352 12/16/22  1242 12/16/22  0737 12/15/22  2057 12/15/22  1550 12/15/22  1129 12/15/22  0740 12/14/22  2113 12/14/22  1627 12/14/22  1140 12/14/22  0748 12/13/22  2046   POC GLUCOSE mg/dl 175* 154* 169* 177* 167* 202* 182* 189* 136 172* 161* 170*                   * I Have Reviewed All Lab Data Listed Above  * Additional Pertinent Lab Tests Reviewed:  All Labs Within Last 24 Hours Reviewed    Imaging:    Imaging Reports Reviewed Today Include: op report      Recent Cultures (last 7 days):           Last 24 Hours Medication List:   Current Facility-Administered Medications   Medication Dose Route Frequency Provider Last Rate   • acetaminophen  650 mg Oral Q6H PRN Casey Calderon MD     • ALPRAZolam  0 25 mg Oral Daily PRN Casey Calderon MD     • aluminum-magnesium hydroxide-simethicone  30 mL Oral Q4H PRN Casey Calderon MD     • amLODIPine  10 mg Oral Daily Casey Calderon MD     • atorvastatin  40 mg Oral Daily With Jacques Espinal MD     • buPROPion  150 mg Oral Daily Casey Calderon MD     • clopidogrel  75 mg Oral Daily Casey Calderon MD     • collagenase   Topical Daily Casey Calderon MD     • doxazosin  4 mg Oral HS Casey Calderon MD     • gabapentin  400 mg Oral TID Casey Calderon MD     • insulin glargine  8 Units Subcutaneous HS Vu Mooney MD     • insulin lispro  1-6 Units Subcutaneous TID [facility] Vu Mooney MD     • labetalol  10 mg Intravenous Q6H PRN Vu Mooney MD     • lactated ringers  125 mL/hr Intravenous Continuous Scott Garjeremie Lewis,  mL/hr (12/16/22 1104)   • lisinopril  40 mg Oral Daily Vu Mooney MD     • methocarbamol  1,000 mg Oral [facility] Vu Mooney MD     • metoclopramide  10 mg Intravenous Q6H PRN Vu Mooney MD     • metoprolol succinate  25 mg Oral Daily Vu Mooney MD     • naloxone  0 04 mg Intravenous Q1MIN PRN Vu Mooney MD     • nystatin   Topical BID Vu Mooney MD     • ondansetron  4 mg Intravenous Q6H PRN Vu Mooney MD     • oxyCODONE  10 mg Oral Q12H [facility] Vu Mooney MD     • oxyCODONE  2 5 mg Oral Q4H PRN Vu Mooney MD      Or   • oxyCODONE  5 mg Oral Q4H PRN Vu Mooney MD      Or   • oxyCODONE  7 5 mg Oral Q4H PRN Vu Mooney MD     • pantoprazole  40 mg Oral Early Morning Vu Mooney MD     • polyethylene glycol  17 g Oral BID Vu Mooney MD     • QUEtiapine  25 mg Oral HS Vu Mooney MD     • senna  1 tablet Oral BID Vu Mooney MD     • sertraline  175 mg Oral Daily Vu Mooney MD     • warfarin  4 mg Oral Daily (warfarin) Vu Mooney MD          Today, Patient Was Seen By: Yoandy Ware MD    ** Please Note: Dictation voice to text software may have been used in the creation of this document   **

## 2022-12-16 NOTE — PHYSICAL THERAPY NOTE
Physical Therapy Treatment Note     12/16/22 0841   PT Last Visit   PT Visit Date 12/16/22   Note Type   Note Type Treatment   Pain Assessment   Pain Assessment Tool 0-10   Pain Score 8  (post session)   Pain Location/Orientation Orientation: Right;Location: Leg   Restrictions/Precautions   Weight Bearing Precautions Per Order Yes   RLE Weight Bearing Per Order (S)  NWB  (AKA)   LLE Weight Bearing Per Order (S)  NWB  (AKA)   Braces or Orthoses Other (Comment)  (B/L AKA)   Other Precautions Bed Alarm; Fall Risk;Pain   General   Chart Reviewed Yes   Subjective   Subjective Pt  agreeable to PT with encouragement   Bed Mobility   Rolling R 3  Moderate assistance   Additional items Assist x 1; Increased time required;LE management;Verbal cues; Bedrails   Rolling L 4  Minimal assistance   Additional items Assist x 1; Increased time required; Bedrails;LE management;Verbal cues   Endurance Deficit   Endurance Deficit Yes   Endurance Deficit Description Pain   Activity Tolerance   Activity Tolerance Patient tolerated treatment well; Other (Comment)  (self limiting, guaded)   Nurse Made Aware Yes   Exercises   Hip Flexion Supine;Bilateral;AROM;AAROM;20 reps   Hip Abduction Supine;Bilateral;AROM;AAROM;20 reps   Assessment   Prognosis Guarded   Problem List Decreased strength;Decreased range of motion;Decreased mobility; Decreased skin integrity;Obesity; Decreased cognition   Assessment Pt  participated in therapy with encouragement  pt  noted to be very guarded rolling to the R side and needed increased assist compared to rolling to the left  pt  needed cues and assist to reach bedrails for bed mobility with HOB flat  Pt  reported increased pain while rolling to the R  Placed bed pan for patient twice per her request however did not have a BM  Pt  able to perform AROM of BLEs in supine however assistance provided to achieve improved ROM  Pt  noted with increased guarding during mobility   pt  reported increased pain of 8/10 in RLE post session and RN was informed of the same post session  Will continue to follow per PT POC   Barriers to Discharge None   Goals   Patient Goals None reported   STG Expiration Date 12/23/22   PT Treatment Day 13   Plan   Treatment/Interventions Bed mobility; Patient/family training; Therapeutic exercise;LE strengthening/ROM;Spoke to nursing   Progress Slow progress, decreased activity tolerance   PT Frequency 3-5x/wk   Recommendation   PT Discharge Recommendation Post acute rehabilitation services   AM-PAC Basic Mobility Inpatient   Turning in Bed Without Bedrails 2   Lying on Back to Sitting on Edge of Flat Bed 2   Moving Bed to Chair 1   Standing Up From Chair 1   Walk in Room 1   Climb 3-5 Stairs 1   Basic Mobility Inpatient Raw Score 8   Turning Head Towards Sound 4   Follow Simple Instructions 4   Low Function Basic Mobility Raw Score 16   Low Function Basic Mobility Standardized Score 25 72   Highest Level Of Mobility   JH-HLM Goal 3: Sit at edge of bed   JH-HLM Achieved 2: Bed activities/Dependent transfer   End of Consult   Patient Position at End of Consult Supine; All needs within reach;Bed/Chair alarm activated           Morgan Hamilton PTA    An AM-PAC basic mobility standardized score less than 42 9 suggest the patient may benefit from discharge to post-acute rehab services

## 2022-12-16 NOTE — ANESTHESIA POSTPROCEDURE EVALUATION
Post-Op Assessment Note    CV Status:  Stable    Pain management: adequate     Mental Status:  Alert and awake   Hydration Status:  Euvolemic   PONV Controlled:  Controlled   Airway Patency:  Patent      Post Op Vitals Reviewed: Yes      Staff: Anesthesiologist         No notable events documented      /76 (12/16/22 1313)    Temp      Pulse 82 (12/16/22 1313)   Resp 17 (12/16/22 1313)    SpO2 98 % (12/16/22 1313)

## 2022-12-16 NOTE — CASE MANAGEMENT
Case Management Discharge Planning Note    Patient name [address]  MRN [id number]  : 1960 Date 2022       Current Admission Date: 10/21/2022  Current Admission Diagnosis:PAD (peripheral artery disease) [facility])   Patient Active Problem List    Diagnosis Date Noted   • Mild protein-calorie malnutrition ([address] ) 2022   • Diarrhea 2022   • CVA (cerebral vascular accident) ([address] ) 2022   • Febrile 2022   • HIT (heparin-induced thrombocytopenia) 2022   • Diabetic ulcer of heel associated with diabetes mellitus due to underlying condition ([address] ) 11/10/2022   • Acute on chronic anemia 10/30/2022   • Generalized edema due to fluid overload 10/27/2022   • PAD (peripheral artery disease) ([address] ) 10/25/2022   • Non healing left heel wound 10/22/2022   • Hypertensive urgency 10/22/2022   • Hypokalemia 10/22/2022   • Wound of lower extremity 10/21/2022   • Epigastric pain 2022   • Type 2 diabetes mellitus with hyperglycemia, with long-term current use of insulin ([address] ) 10/18/2021   • Hyperparathyroidism ([address] ) 10/18/2021   • 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 ([address] ) 2021   • 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   • Moderate protein-calorie malnutrition ([address] ) 2020   • Acute colitis 2020   • Arthritis 2019   • Depression, recurrent ([address] ) 2018   • Class 3 severe obesity due to excess calories with serious comorbidity and body mass index (BMI) of 40 0 to 44 9 in adult [facility]) 2018   • Esophageal reflux 2018   • Uncontrolled type 2 diabetes with neuropathy 2018   • Diabetic peripheral neuropathy ([address] )    • Systolic murmur    • Stroke ([address] ) 2015   • Anxiety 2015   • Vitamin D deficiency 2015   • Benign essential hypertension 2012   • Familial combined hyperlipidemia 2012      LOS (days): 55  Geometric Mean LOS (GMLOS) (days):   Days to GMLOS:     OBJECTIVE:  Risk of Unplanned Readmission Score: 33 52         Current admission status: Inpatient   Preferred Pharmacy:   [id number] [place], [address]  [address]  [address] [facility]  Phone: [phone] Fax: [phone]    Primary Care Provider: ALEX Bell    Primary Insurance: BLUE CROSS  Secondary Insurance:     DISCHARGE DETAILS:     Patient is not medically stable for discharge today  Plan is for patient to go to OR today for debridement  DC Plan is for patient to go to [address], once medically stable- CM updated [facility]  Patient will need transport set up at discharge  CM will continue to follow patient for discharge needs

## 2022-12-16 NOTE — PROGRESS NOTES
[address] [facility]  [facility] Note [facility] 1960, 65 y o  female MRN: [id number]  Unit/Bed#: OR [place] Encounter: [id number]  Primary Care Provider: ALEX Alberto   Date and time admitted to hospital: 10/21/2022  7:58 PM    * PAD (peripheral artery disease) [facility])  Assessment & Plan  65year old female former smoker w/HTN, HLD, uncontrolled type II DM (A1c 9 8), hx CVA, presenting with nonhealing extensive L heel ulceration and R hallux and 5th digit ulceration  Vascular surgery consulted for input  S/p multiple b/l endovascular interventions  JUSTIN  R hemispheric CVA    S/p L AKA 11/23/22 [facility])  S/p R AKA, wound debridement 12/1/22 [facility])      Recommendations:  - Bilateral tissue loss in the setting of uncontrolled type II DM and NYDIA on admission, now s/p multiple angiograms w/intervention    - Endovascular interventions, c/b atheroembolic event to the RLE and JUSTIN with R hemispheric CVA  - Now s/p L AKA on 11/23/22 and R AKA 12/1/22 w/ proximal thigh wound debridements  - Plastic surgery input appreciated  Recommending daily santyl to R thigh wounds  Wounds will require debridement  Plan for OR today with Dr Ynes Segovia  - On coumadin, noted to be subtherapeutic today, however will adjust coumadin dose accordingly  D/w pharmacy, will give 4mg coumadin tonight and recheck INR tomorrow AM  - Medical management with statin, Plavix  - Medical management and glucose control per SLIM  - Continue oral pain regimen, adjusted to long acting which appears to be controlling patient's pain well   - Appreciate psych input and management - addition of wellbutrin and adjustment of zoloft   - D/w Dr Ynes Segovia        Subjective: Patient seen and examined  Doing well  Plan for OR today   Patient in agreement    Vitals:  /84 (BP Location: Left arm)   Pulse 90   Temp 97 9 °F (36 6 °C) (Temporal)   Resp 18   Ht 5' 4" (1 626 m)   Wt 72 3 kg (159 lb 6 3 oz)   SpO2 92%   BMI 27 36 kg/m²     I/Os:  I/O last 3 completed shifts: In: 710 [P O :360; Blood:350]  Out: 601 [Urine:601]  I/O this shift: In: 999 [I V :999]  Out: -     Lab Results and Cultures:   CBC with diff: Lab Results   Component Value Date    WBC 9 14 12/16/2022    HGB 8 1 (L) 12/16/2022    HCT 26 5 (L) 12/16/2022    MCV 86 12/16/2022     12/16/2022    MCH 26 3 (L) 12/16/2022    MCHC 30 6 (L) 12/16/2022    RDW 16 7 (H) 12/16/2022    MPV 8 1 (L) 12/16/2022    NRBC 0 12/05/2022   ,   BMP/CMP:  Lab Results   Component Value Date    SODIUM 141 12/11/2022    K 3 6 12/11/2022     12/11/2022    CO2 28 12/11/2022    BUN 12 12/11/2022    CREATININE 0 80 12/11/2022    CALCIUM 8 4 12/11/2022    AST 25 12/02/2022    ALT 12 12/02/2022    ALKPHOS 318 (H) 12/02/2022    EGFR 79 12/11/2022   ,   Lipid Panel: No results found for: CHOL,   Coags:   Lab Results   Component Value Date    PTT 95 (H) 12/12/2022    INR 1 73 (H) 12/16/2022   ,     Blood Culture:   Lab Results   Component Value Date    BLOODCX No Growth After 5 Days   11/13/2022    BLOODCX Staphylococcus coagulase negative (A) 11/13/2022   ,   Urinalysis: Lab Results   Component Value Date    COLORU Yellow 11/12/2022    CLARITYU Slightly Cloudy 11/12/2022    SPECGRAV 1 025 11/12/2022    PHUR 5 5 11/12/2022    PHUR 6 5 02/23/2018    LEUKOCYTESUR Trace (A) 11/12/2022    NITRITE Positive (A) 11/12/2022    GLUCOSEU Negative 11/12/2022    KETONESU Negative 11/12/2022    BILIRUBINUR Negative 11/12/2022    BLOODU Large (A) 11/12/2022   ,   Urine Culture:   Lab Results   Component Value Date    URINECX >100,000 cfu/ml Enterobacter cloacae complex (A) 11/12/2022    URINECX 70,000-79,000 cfu/ml Kluyveromyces marxianus (A) 11/12/2022   ,   Wound Culure: No results found for: WOUNDCULT    Medications:  Current Facility-Administered Medications   Medication Dose Route Frequency   • [MAR Hold] acetaminophen (TYLENOL) tablet 650 mg  650 mg Oral Q6H PRN   • [MAR Hold] ALPRAZolam Iris Carpenter) tablet 0 25 mg  0 25 mg Oral Daily PRN   • [MAR Hold] aluminum-magnesium hydroxide-simethicone (MYLANTA) oral suspension 30 mL  30 mL Oral Q4H PRN   • [MAR Hold] amLODIPine (NORVASC) tablet 10 mg  10 mg Oral Daily   • [MAR Hold] atorvastatin (LIPITOR) tablet 40 mg  40 mg Oral Daily With Dinner   • [MAR Hold] buPROPion (WELLBUTRIN XL) 24 hr tablet 150 mg  150 mg Oral Daily   • [MAR Hold] clopidogrel (PLAVIX) tablet 75 mg  75 mg Oral Daily   • [MAR Hold] collagenase (SANTYL) ointment   Topical Daily   • [MAR Hold] doxazosin (CARDURA) tablet 4 mg  4 mg Oral HS   • [MAR Hold] gabapentin (NEURONTIN) capsule 400 mg  400 mg Oral TID   • [MAR Hold] insulin glargine (LANTUS) subcutaneous injection 8 Units 0 08 mL  8 Units Subcutaneous HS   • [MAR Hold] insulin lispro (HumaLOG) 100 units/mL subcutaneous injection 1-6 Units  1-6 Units Subcutaneous TID AC   • [MAR Hold] labetalol (NORMODYNE) injection 10 mg  10 mg Intravenous Q6H PRN   • lactated ringers infusion  125 mL/hr Intravenous Continuous   • [MAR Hold] lisinopril (ZESTRIL) tablet 40 mg  40 mg Oral Daily   • [MAR Hold] methocarbamol (ROBAXIN) tablet 1,000 mg  1,000 mg Oral Q8H [address]   • [MAR Hold] metoclopramide (REGLAN) injection 10 mg  10 mg Intravenous Q6H PRN   • [MAR Hold] metoprolol succinate (TOPROL-XL) 24 hr tablet 25 mg  25 mg Oral Daily   • [MAR Hold] naloxone (NARCAN) 0 04 mg/mL syringe 0 04 mg  0 04 mg Intravenous Q1MIN PRN   • [MAR Hold] nystatin (MYCOSTATIN) powder   Topical BID   • [MAR Hold] ondansetron (ZOFRAN) injection 4 mg  4 mg Intravenous Q6H PRN   • [MAR Hold] oxyCODONE (OxyCONTIN) 12 hr tablet 10 mg  10 mg Oral Q12H MICHELLE   • [MAR Hold] oxyCODONE (ROXICODONE) IR tablet 2 5 mg  2 5 mg Oral Q4H PRN    Or   • [MAR Hold] oxyCODONE (ROXICODONE) IR tablet 5 mg  5 mg Oral Q4H PRN    Or   • [MAR Hold] oxyCODONE (ROXICODONE) IR tablet 7 5 mg  7 5 mg Oral Q4H PRN   • [MAR Hold] pantoprazole (PROTONIX) EC tablet 40 mg  40 mg Oral Early Morning   • [MAR Hold] polyethylene glycol (MIRALAX) packet 17 g  17 g Oral BID   • [MAR Hold] QUEtiapine (SEROquel) tablet 25 mg  25 mg Oral HS   • [MAR Hold] senna (SENOKOT) tablet 8 6 mg  1 tablet Oral BID   • [MAR Hold] sertraline (ZOLOFT) tablet 175 mg  175 mg Oral Daily   • sodium chloride 0 9 % infusion  100 mL/hr Intravenous Continuous   • [MAR Hold] warfarin (COUMADIN) tablet 4 mg  4 mg Oral Daily (warfarin)       Imaging:  Reviewed  Physical Exam:    General: Alert and oriented   In no acute distress  CV: Regular rate   Respiratory: Normal effort   Abdominal: Soft, non-distended   Extremities: S/p B/L AKA  Neurologic: Grossly normal      Rigo Morales PA-C  12/16/2022

## 2022-12-16 NOTE — OP NOTE
OPERATIVE REPORT  PATIENT NAME: Rowdy Contreras    :  1960  MRN: [id number]  Pt Location: [place] [address]    SURGERY DATE: 2022    Surgeon(s) and Role:     * Rafael Navarro MD - Primary     * Karo Morocho MD - Assisting    Preop Diagnosis:  PAD (peripheral artery disease) ([address] ) [I73 9]    Post-Op Diagnosis Codes:     * PAD (peripheral artery disease) ([address] ) [I73 9]    Procedure(s) (LRB):    Right AKA stump washout, debridement, application of wound vac  Debridement right lateral thigh wound and posterior thigh wound      Specimen(s):  * No specimens in log *    Estimated Blood Loss:   Minimal    Drains:  External Urinary Catheter (Active)   Collection Container Canister and suction tubing (For Female) 12/15/22 0800   Interventions Pericare performed;Removed and skin assessed 12/15/22 1800   Output (mL) 150 mL 12/15/22 1844   Number of days: 11       Anesthesia Type:   General w/ Femoral Block    Operative Indications:  PAD (peripheral artery disease) ([facility] [address] ) [I73 9]      Operative Findings:  Nonhealing, unhealthy skin edges of right AKA stump, staples were removed wound was irrigated and vac was placed  Right lateral and posterior thigh skin wounds with fibrinous tissue and sloughing were sharply debrided and included in part of vac  No evidence of infection  Wound dimensions  Central wound/stump 18 5 cm x 5 cm x 2 5 cm  Right lateral wound 6 5 cm 5 5cm x 0 5cm   4 black sponges    Complications:   None    Procedure and Technique:  The patient was brought to the operating room and placed on the operating room table in a supine fashion  General endotracheal anesthesia was induced and preoperative antibiotics were administered  Bilateral stumps were prepped and draped in the standard sterile fashion  A time out was performed  For the right stump, staples were removed, we stayed extrafascial and the skin edges were sharply debrided to bleeding skin edges   The right lateral and posterior cutaneous wounds were sharply debrided with scalpel to bleeding skin edges  The wound was copiously pulse lavaged with sterile saline  Then black sponge was cut to size and placed in the wound and secured with adhesive tape  Another sponge was used to make a bridge and amparo pad was placed and connected to -125mmHg negative pressure with excellent seal   Two staples from middle of left AKA stump was removed and fat necrosis was expressed, this was irrigated and re-approximated with 2 interrupted vertical mattress sutures  The patient was extubated without issues and transferred to PACU in stable condition  Dr Osman Urbina was scrubbed, present and participated in the entire case        Patient Disposition:  PACU         SIGNATURE: Flash Fabian MD  DATE: December 16, 2022  TIME: 12:28 PM

## 2022-12-16 NOTE — ASSESSMENT & PLAN NOTE
Lab Results   Component Value Date    HGBA1C 9 8 (H) 10/25/2022     Recent Labs     12/15/22  2057 12/16/22  0737 12/16/22  1242 12/16/22  1352   POCGLU 177* 169* 154* 175*     Significantly reduced regimen from home due to poor oral intake  Goal sugar < 180  If still not at goal, will increase  Lantus to 10 units nightly   For now keep at 8 units since she will have surgery tomorrow  Continue sliding scale  Adjust  insulin regimen as needed

## 2022-12-16 NOTE — QUICK NOTE
66 yo female w/ b/l BKA, c/b nonhealing R AKA with infected wounds, now s/p R AKA debridement and wound vac placement on 12/16  Pain is controlled  Pt is tolerating a diet w/o n/v  Doing fairly well postop    Continue wound vac likely change on Monday  Rest of plan as noted this morning        Ramez Sanabria MD  6:42 PM  12/16/22

## 2022-12-17 LAB
ERYTHROCYTE [DISTWIDTH] IN BLOOD BY AUTOMATED COUNT: 16.9 % (ref 11.6–15.1)
GLUCOSE SERPL-MCNC: 142 MG/DL (ref 65–140)
GLUCOSE SERPL-MCNC: 152 MG/DL (ref 65–140)
GLUCOSE SERPL-MCNC: 175 MG/DL (ref 65–140)
GLUCOSE SERPL-MCNC: 188 MG/DL (ref 65–140)
HCT VFR BLD AUTO: 25 % (ref 34.8–46.1)
HGB BLD-MCNC: 7.7 G/DL (ref 11.5–15.4)
INR PPP: 1.88 (ref 0.84–1.19)
MCH RBC QN AUTO: 26.8 PG (ref 26.8–34.3)
MCHC RBC AUTO-ENTMCNC: 30.8 G/DL (ref 31.4–37.4)
MCV RBC AUTO: 87 FL (ref 82–98)
PLATELET # BLD AUTO: 275 THOUSANDS/UL (ref 149–390)
PMV BLD AUTO: 8.2 FL (ref 8.9–12.7)
PROTHROMBIN TIME: 21.5 SECONDS (ref 11.6–14.5)
RBC # BLD AUTO: 2.87 MILLION/UL (ref 3.81–5.12)
WBC # BLD AUTO: 11.18 THOUSAND/UL (ref 4.31–10.16)

## 2022-12-17 PROCEDURE — 85027 COMPLETE CBC AUTOMATED: CPT | Performed by: SURGERY

## 2022-12-17 PROCEDURE — 99024 POSTOP FOLLOW-UP VISIT: CPT | Performed by: SURGERY

## 2022-12-17 PROCEDURE — 99232 SBSQ HOSP IP/OBS MODERATE 35: CPT | Performed by: INTERNAL MEDICINE

## 2022-12-17 PROCEDURE — 85610 PROTHROMBIN TIME: CPT | Performed by: SURGERY

## 2022-12-17 PROCEDURE — 82948 REAGENT STRIP/BLOOD GLUCOSE: CPT

## 2022-12-17 RX ORDER — OXYCODONE HYDROCHLORIDE 5 MG/1
7.5 TABLET ORAL ONCE
Status: COMPLETED | OUTPATIENT
Start: 2022-12-17 | End: 2022-12-17

## 2022-12-17 RX ADMIN — OXYCODONE HYDROCHLORIDE 10 MG: 10 TABLET, FILM COATED, EXTENDED RELEASE ORAL at 08:22

## 2022-12-17 RX ADMIN — NYSTATIN: 100000 POWDER TOPICAL at 17:20

## 2022-12-17 RX ADMIN — INSULIN LISPRO 1 UNITS: 100 INJECTION, SOLUTION INTRAVENOUS; SUBCUTANEOUS at 16:27

## 2022-12-17 RX ADMIN — OXYCODONE 7.5 MG: 5 TABLET ORAL at 12:25

## 2022-12-17 RX ADMIN — METOPROLOL SUCCINATE 25 MG: 25 TABLET, EXTENDED RELEASE ORAL at 08:22

## 2022-12-17 RX ADMIN — INSULIN LISPRO 1 UNITS: 100 INJECTION, SOLUTION INTRAVENOUS; SUBCUTANEOUS at 08:22

## 2022-12-17 RX ADMIN — DOXAZOSIN 4 MG: 4 TABLET ORAL at 21:10

## 2022-12-17 RX ADMIN — SERTRALINE HYDROCHLORIDE 175 MG: 100 TABLET ORAL at 08:22

## 2022-12-17 RX ADMIN — GABAPENTIN 400 MG: 400 CAPSULE ORAL at 16:27

## 2022-12-17 RX ADMIN — GABAPENTIN 400 MG: 400 CAPSULE ORAL at 21:11

## 2022-12-17 RX ADMIN — NYSTATIN: 100000 POWDER TOPICAL at 08:27

## 2022-12-17 RX ADMIN — PANTOPRAZOLE SODIUM 40 MG: 40 TABLET, DELAYED RELEASE ORAL at 06:30

## 2022-12-17 RX ADMIN — METHOCARBAMOL 1000 MG: 500 TABLET ORAL at 06:30

## 2022-12-17 RX ADMIN — QUETIAPINE FUMARATE 25 MG: 25 TABLET ORAL at 21:11

## 2022-12-17 RX ADMIN — METHOCARBAMOL 1000 MG: 500 TABLET ORAL at 21:10

## 2022-12-17 RX ADMIN — BUPROPION HYDROCHLORIDE 150 MG: 150 TABLET, FILM COATED, EXTENDED RELEASE ORAL at 08:22

## 2022-12-17 RX ADMIN — LISINOPRIL 40 MG: 20 TABLET ORAL at 08:22

## 2022-12-17 RX ADMIN — CLOPIDOGREL BISULFATE 75 MG: 75 TABLET ORAL at 08:22

## 2022-12-17 RX ADMIN — METHOCARBAMOL 1000 MG: 500 TABLET ORAL at 13:15

## 2022-12-17 RX ADMIN — INSULIN GLARGINE 8 UNITS: 100 INJECTION, SOLUTION SUBCUTANEOUS at 21:11

## 2022-12-17 RX ADMIN — ATORVASTATIN CALCIUM 40 MG: 40 TABLET, FILM COATED ORAL at 17:19

## 2022-12-17 RX ADMIN — GABAPENTIN 400 MG: 400 CAPSULE ORAL at 08:22

## 2022-12-17 RX ADMIN — OXYCODONE HYDROCHLORIDE 10 MG: 10 TABLET, FILM COATED, EXTENDED RELEASE ORAL at 21:11

## 2022-12-17 RX ADMIN — WARFARIN SODIUM 4 MG: 2 TABLET ORAL at 17:19

## 2022-12-17 RX ADMIN — AMLODIPINE BESYLATE 10 MG: 10 TABLET ORAL at 08:22

## 2022-12-17 NOTE — ASSESSMENT & PLAN NOTE
Lab Results   Component Value Date    HGBA1C 9 8 (H) 10/25/2022     Recent Labs     12/16/22  1352 12/16/22  1549 12/16/22  2117 12/17/22  0804   POCGLU 175* 175* 116 152*     Significantly reduced regimen from home due to poor oral intake  Goal sugar < 180  stable  For now keep Lantus at 8 units    Continue sliding scale  Adjust  insulin regimen as needed

## 2022-12-17 NOTE — ASSESSMENT & PLAN NOTE
· She was initially admitted for nonhealing wound of the left lower extremity, requiring multiple endovascular interventions and ultimately underwent left AKA on 11/23/2022 and right AKA on 12/1/22  · S/p right stump debridement and vac placement 12/16/22  · For additional debridement on Monday per surgery    · Continue pain control  · Eventual short-term rehab placement when medically stable  · Management per primary service

## 2022-12-17 NOTE — PLAN OF CARE
Problem: Potential for Falls  Goal: Patient will remain free of falls  Description: INTERVENTIONS:  - Educate patient/family on patient safety including physical limitations  - Instruct patient to call for assistance with activity   - Consult OT/PT to assist with strengthening/mobility   - Keep Call bell within reach  - Keep bed low and locked with side rails adjusted as appropriate  - Keep care items and personal belongings within reach  - Initiate and maintain comfort rounds  - Make Fall Risk Sign visible to staff  - Offer Toileting every 2 Hours, in advance of need  - Initiate/Maintain bed  chair alarm  - Obtain necessary fall risk management equipment: bed chair alarm, call bell, gripper sock, walker, bedside commode  - Apply yellow socks and bracelet for high fall risk patients  - Consider moving patient to room near nurses station  Outcome: Progressing     Problem: MOBILITY - ADULT  Goal: Maintain or return to baseline ADL function  Description: INTERVENTIONS:  -  Assess patient's ability to carry out ADLs; assess patient's baseline for ADL function and identify physical deficits which impact ability to perform ADLs (bathing, care of mouth/teeth, toileting, grooming, dressing, etc )  - Assess/evaluate cause of self-care deficits   - Assess range of motion  - Assess patient's mobility; develop plan if impaired  - Assess patient's need for assistive devices and provide as appropriate  - Encourage maximum independence but intervene and supervise when necessary  - Involve family in performance of ADLs  - Assess for home care needs following discharge   - Consider OT consult to assist with ADL evaluation and planning for discharge  - Provide patient education as appropriate  Outcome: Progressing     Problem: PAIN - ADULT  Goal: Verbalizes/displays adequate comfort level or baseline comfort level  Description: Interventions:  - Encourage patient to monitor pain and request assistance  - Assess pain using appropriate pain scale  - Administer analgesics based on type and severity of pain and evaluate response  - Implement non-pharmacological measures as appropriate and evaluate response  - Consider cultural and social influences on pain and pain management  - Notify physician/advanced practitioner if interventions unsuccessful or patient reports new pain  Outcome: Progressing     Problem: INFECTION - ADULT  Goal: Absence or prevention of progression during hospitalization  Description: INTERVENTIONS:  - Assess and monitor for signs and symptoms of infection  - Monitor lab/diagnostic results  - Monitor all insertion sites, i e  indwelling lines, tubes, and drains  - Administer medications as ordered  - Instruct and encourage patient and family to use good hand hygiene technique  Outcome: Progressing     Problem: SAFETY ADULT  Goal: Patient will remain free of falls  Description: INTERVENTIONS:  - Educate patient/family on patient safety including physical limitations  - Instruct patient to call for assistance with activity   - Consult OT/PT to assist with strengthening/mobility   - Keep Call bell within reach  - Keep bed low and locked with side rails adjusted as appropriate  - Keep care items and personal belongings within reach  - Initiate and maintain comfort rounds  - Make Fall Risk Sign visible to staff  - Offer Toileting every 2 Hours, in advance of need  - Initiate/Maintain bed  chair alarm  - Obtain necessary fall risk management equipment: bed chair alarm, call bell, gripper sock, walker, bedside commode  - Apply yellow socks and bracelet for high fall risk patients  - Consider moving patient to room near nurses station  Outcome: Progressing  Goal: Maintain or return to baseline ADL function  Description: INTERVENTIONS:  -  Assess patient's ability to carry out ADLs; assess patient's baseline for ADL function and identify physical deficits which impact ability to perform ADLs (bathing, care of mouth/teeth, toileting, grooming, dressing, etc )  - Assess/evaluate cause of self-care deficits   - Assess range of motion  - Assess patient's mobility; develop plan if impaired  - Assess patient's need for assistive devices and provide as appropriate  - Encourage maximum independence but intervene and supervise when necessary  - Involve family in performance of ADLs  - Assess for home care needs following discharge   - Consider OT consult to assist with ADL evaluation and planning for discharge  - Provide patient education as appropriate  Outcome: Progressing     Problem: DISCHARGE PLANNING  Goal: Discharge to home or other facility with appropriate resources  Description: INTERVENTIONS:  - Identify barriers to discharge w/patient   - Arrange for needed discharge resources and transportation as appropriate  - Identify discharge learning needs  - Refer to Case Management Department for coordinating discharge planning if the patient needs post-hospital services based on physician/advanced practitioner order   Outcome: Progressing     Problem: Knowledge Deficit  Goal: Patient/family/caregiver demonstrates understanding of disease process, treatment plan, medications, and discharge instructions  Description: Complete learning assessment and assess knowledge base    Interventions:  - Provide teaching at level of understanding  - Provide teaching via preferred learning methods  Outcome: Progressing     Problem: METABOLIC, FLUID AND ELECTROLYTES - ADULT  Goal: Electrolytes maintained within normal limits  Description: INTERVENTIONS:  - Monitor labs and assess patient for signs and symptoms of electrolyte imbalances  - Administer electrolyte replacement as ordered  - Monitor response to electrolyte replacements, including repeat lab results as appropriate  - Instruct patient on fluid and nutrition as appropriate  Outcome: Progressing  Goal: Fluid balance maintained  Description: INTERVENTIONS:  - Monitor labs   - Monitor I/O and WT  - Instruct patient on fluid and nutrition as appropriate  - Assess for signs & symptoms of volume excess or deficit  Outcome: Progressing  Goal: Glucose maintained within target range  Description: INTERVENTIONS:  - Monitor Blood Glucose as ordered  - Assess for signs and symptoms of hyperglycemia and hypoglycemia  - Administer ordered medications to maintain glucose within target range  - Assess nutritional intake and initiate nutrition service referral as needed  Outcome: Progressing     Problem: SKIN/TISSUE INTEGRITY - ADULT  Goal: Skin Integrity remains intact(Skin Breakdown Prevention)  Description: Assess:  -Perform Nicanor assessment every shift  -Clean and moisturize skin every shift  -Inspect skin when repositioning, toileting, and assisting with ADLS  -Assess under medical devices   -Assess extremities for adequate circulation and sensation     Bed Management:  -Have minimal linens on bed & keep smooth, unwrinkled  -Change linens as needed when moist or perspiring  -Avoid sitting or lying in one position for more than 2 hours while in bed    Toileting:  -Offer bedside commode  -Assess for incontinence every 2 hours  -Use incontinent care products after each incontinent episode     Activity:  -Mobilize patient 3 times a day  -Encourage or provide ROM exercises   -Turn and reposition patient every 2 Hours  -Use appropriate equipment to lift or move patient in bed  -Instruct/ Assist with weight shifting every 15 minutes when out of bed in chair  -Consider limitation of chair time 1 hour intervals    Skin Care:  -Avoid use of baby powder, tape, friction and shearing, hot water or constrictive clothing  -Relieve pressure over bony prominences using waffle cushion when in chair  -Do not massage red bony areas    Outcome: Progressing  Goal: Incision(s), wounds(s) or drain site(s) healing without S/S of infection  Description: INTERVENTIONS  - Assess and document dressing, incision, wound bed, drain sites and surrounding tissue  - Provide patient and family education  - Perform skin care/dressing changes everyday as ordered  Outcome: Progressing  Goal: Pressure injury heals and does not worsen  Description: Interventions:  - Implement low air loss mattress or specialty surface (Criteria met)  - Apply silicone foam dressing  - Instruct/assist with weight shifting every 15 minutes when in chair   - Limit chair time to 1 hour intervals  - Use special pressure reducing interventions such as waffle cushion when in chair   - Perform passive or active ROM   - Turn and reposition patient & offload bony prominences every 2 hours   - Utilize friction reducing device or surface for transfers   - Consider consults to  interdisciplinary teams such as wound care, PT/OT  - Use incontinent care products after each incontinent episode   - Consider nutrition services referral as needed  Outcome: Progressing     Problem: MUSCULOSKELETAL - ADULT  Goal: Maintain or return mobility to safest level of function  Description: INTERVENTIONS:  - Assess patient's ability to carry out ADLs; assess patient's baseline for ADL function and identify physical deficits which impact ability to perform ADLs (bathing, care of mouth/teeth, toileting, grooming, dressing, etc )  - Assess/evaluate cause of self-care deficits   - Assess range of motion  - Assess patient's mobility  - Assess patient's need for assistive devices and provide as appropriate  - Encourage maximum independence but intervene and supervise when necessary  - Involve family in performance of ADLs  - Assess for home care needs following discharge   - Consider OT consult to assist with ADL evaluation and planning for discharge  - Provide patient education as appropriate  Outcome: Progressing  Goal: Maintain proper alignment of affected body part  Description: INTERVENTIONS:  - Support, maintain and protect limb and body alignment  - Provide patient/ family with appropriate education  Outcome: Progressing     Problem: CARDIOVASCULAR - ADULT  Goal: Maintains optimal cardiac output and hemodynamic stability  Description: INTERVENTIONS:  - Monitor I/O, vital signs and rhythm  - Monitor for S/S and trends of decreased cardiac output  - Administer and titrate ordered vasoactive medications to optimize hemodynamic stability  - Assess quality of pulses, skin color and temperature  - Assess for signs of decreased coronary artery perfusion  - Instruct patient to report change in severity of symptoms  Outcome: Progressing     Problem: RESPIRATORY - ADULT  Goal: Achieves optimal ventilation and oxygenation  Description: INTERVENTIONS:  - Assess for changes in respiratory status  - Assess for changes in mentation and behavior  - Position to facilitate oxygenation and minimize respiratory effort  - Oxygen administered by appropriate delivery if ordered  - Initiate smoking cessation education as indicated  - Encourage broncho-pulmonary hygiene including cough, deep breathe, Incentive Spirometry  - Assess the need for suctioning and aspirate as needed  - Assess and instruct to report SOB or any respiratory difficulty  - Respiratory Therapy support as indicated  Outcome: Progressing     Problem: Prexisting or High Potential for Compromised Skin Integrity  Goal: Skin integrity is maintained or improved  Description: INTERVENTIONS:  - Identify patients at risk for skin breakdown  - Assess and monitor skin integrity  - Assess and monitor nutrition and hydration status  - Monitor labs   - Assess for incontinence   - Turn and reposition patient  - Assist with mobility/ambulation  - Relieve pressure over bony prominences  - Avoid friction and shearing  - Provide appropriate hygiene as needed including keeping skin clean and dry  - Evaluate need for skin moisturizer/barrier cream  - Collaborate with interdisciplinary team   - Patient/family teaching  - Consider wound care consult   Outcome: Progressing     Problem: Nutrition/Hydration-ADULT  Goal: Nutrient/Hydration intake appropriate for improving, restoring or maintaining nutritional needs  Description: Monitor and assess patient's nutrition/hydration status for malnutrition  Collaborate with interdisciplinary team and initiate plan and interventions as ordered  Monitor patient's weight and dietary intake as ordered or per policy  Utilize nutrition screening tool and intervene as necessary  Determine patient's food preferences and provide high-protein, high-caloric foods as appropriate       INTERVENTIONS:  - Monitor oral intake, urinary output, labs, and treatment plans  - Assess nutrition and hydration status and recommend course of action  - Evaluate amount of meals eaten  - Assist patient with eating if necessary   - Allow adequate time for meals  - Recommend/ encourage appropriate diets, oral nutritional supplements, and vitamin/mineral supplements  - Order, calculate, and assess calorie counts as needed  - Recommend, monitor, and adjust tube feedings and TPN/PPN based on assessed needs  - Assess need for intravenous fluids  - Provide specific nutrition/hydration education as appropriate  - Include patient/family/caregiver in decisions related to nutrition  Outcome: Progressing     Problem: GENITOURINARY - ADULT  Goal: Maintains or returns to baseline urinary function  Description: INTERVENTIONS:  - Assess urinary function  - Encourage oral fluids to ensure adequate hydration if ordered  - Administer IV fluids as ordered to ensure adequate hydration  - Administer ordered medications as needed  - Offer frequent toileting  - Follow urinary retention protocol if ordered  Outcome: Progressing  Goal: Absence of urinary retention  Description: INTERVENTIONS:  - Assess patient’s ability to void and empty bladder  - Monitor I/O  - Bladder scan as needed  - Discuss with physician/AP medications to alleviate retention as needed  - Discuss catheterization for long term situations as appropriate  Outcome: Progressing     Problem: HEMATOLOGIC - ADULT  Goal: Maintains hematologic stability  Description: INTERVENTIONS  - Assess for signs and symptoms of bleeding or hemorrhage  - Monitor labs  - Administer supportive blood products/factors as ordered and appropriate  Outcome: Progressing     Problem: MOBILITY - ADULT  Goal: Maintains/Returns to pre admission functional level  Description: INTERVENTIONS:  - Perform BMAT or MOVE assessment daily    - Set and communicate daily mobility goal to care team and patient/family/caregiver  - Collaborate with rehabilitation services on mobility goals if consulted  - Assist patient in repositioning every 2 hours  - Dangle patient 3 times a day  - Stand patient 3 times a day  - Out of bed to chair as tolerated  - Out of bed for meals  - Out of bed for toileting  - Record patient progress and toleration of activity level   Outcome: Not Progressing     Problem: SAFETY ADULT  Goal: Maintains/Returns to pre admission functional level  Description: INTERVENTIONS:  - Perform BMAT or MOVE assessment daily    - Set and communicate daily mobility goal to care team and patient/family/caregiver  - Collaborate with rehabilitation services on mobility goals if consulted  - Assist patient in repositioning every 2 hours    - Dangle patient 3 times a day  - Stand patient 3 times a day  - Out of bed to chair as tolerated  - Out of bed for meals  - Out of bed for toileting  - Record patient progress and toleration of activity level   Outcome: Not Progressing

## 2022-12-17 NOTE — PROGRESS NOTES
[address] [facility]  [facility] 1960, 65 y [facility]  female MRN: [id number]  Unit/Bed#: E4 -01 Encounter: [id number]  Primary Care Provider: ALEX Morocho   Date and time admitted to hospital: 10/21/2022  7:58 PM    * PAD (peripheral artery disease) ([address] )  Assessment & Plan  · She was initially admitted for nonhealing wound of the left lower extremity, requiring multiple endovascular interventions and ultimately underwent left AKA on 11/23/2022 and right AKA on 12/1/22  · S/p right stump debridement and vac placement 12/16/22  · For additional debridement on Monday per surgery  · Continue pain control  · Eventual short-term rehab placement when medically stable  · Management per primary service    Stroke [facility])  Assessment & Plan  · Noted to have change in mental status on 11/13/2022  · CT confirmed moderate right parietal lobe stroke, likely embolic in the setting of HIT with thrombosis  · She was treated with argatroban/coumadin bridge  · Resume coumadin tonight   · Goal INR 2-3    HIT (heparin-induced thrombocytopenia)  Assessment & Plan  · Positive heparin antibody on 11/11/2022  · Completed 5 days of overlap with argatroban and Coumadin   · Resume coumadin tonight  · Goal INR 2-3    Type 2 diabetes mellitus with hyperglycemia, with long-term current use of insulin [facility])  Assessment & Plan  Lab Results   Component Value Date    HGBA1C 9 8 (H) 10/25/2022     Recent Labs     12/16/22  1352 12/16/22  1549 12/16/22  2117 12/17/22  0804   POCGLU 175* 175* 116 152*     Significantly reduced regimen from home due to poor oral intake  Goal sugar < 180  stable  For now keep Lantus at 8 units    Continue sliding scale  Adjust  insulin regimen as needed     Acute on chronic anemia  Assessment & Plan  · S/p 1 unit prbc on 12/15/22  · Monitor    Benign essential hypertension  Assessment & Plan  Continue amlodipine 10 mg daily, doxazosin 4 mg at bedtime, metoprolol 25 mg daily, lisinopril 40 mg daily with hold parameters    Depression, recurrent ([address] )  Assessment & Plan  Evaluated by Psychiatry x 2 on this admission  ·  continue Zoloft 175 mg daily, Seroquel 25 mg at bedtime  VTE Pharmacologic Prophylaxis:   Pharmacologic: Warfarin (Coumadin)  Mechanical VTE Prophylaxis in Place: No    Time Spent for Care: 20 minutes  More than 50% of total time spent on counseling and coordination of care as described above  Current Length of Stay: 56 day(s)    Current Patient Status: Inpatient     Discharge Plan: Placement    Code Status: Level 1 - Full Code      Subjective:   Seen and examined earlier during rounds  She admitted having pain in the right stump with certain movements  Fever or chills  No need for oxygen last night    Objective:     Vitals:   Temp (24hrs), Av 5 °F (36 9 °C), Min:97 8 °F (36 6 °C), Max:99 8 °F (37 7 °C)    Temp:  [97 8 °F (36 6 °C)-99 8 °F (37 7 °C)] 98 4 °F (36 9 °C)  HR:  [80-98] 88  Resp:  [17-21] 18  BP: (140-195)/(65-85) 154/67  SpO2:  [90 %-99 %] 92 %  Body mass index is 28 15 kg/m²  Input and Output Summary (last 24 hours): Intake/Output Summary (Last 24 hours) at 2022 0937  Last data filed at 2022 1254  Gross per 24 hour   Intake 1550 ml   Output 360 ml   Net 1190 ml       Physical Exam:     Physical Exam  Vitals reviewed  Constitutional:       Appearance: She is obese  HENT:      Head: Normocephalic and atraumatic  Nose: No congestion or rhinorrhea  Eyes:      General: No scleral icterus  Cardiovascular:      Rate and Rhythm: Normal rate and regular rhythm  Pulmonary:      Effort: No respiratory distress  Breath sounds: No wheezing or rales  Abdominal:      General: Abdomen is flat  There is no distension  Palpations: Abdomen is soft  Musculoskeletal:         General: Deformity present  Cervical back: Neck supple  Comments: Right stump wound VAC    Left AKA   Neurological:      Mental Status: She is oriented to person, place, and time  Psychiatric:         Behavior: Behavior normal      Additional Data:     Labs:    Results from last 7 days   Lab Units 12/17/22  0430   WBC Thousand/uL 11 18*   HEMOGLOBIN g/dL 7 7*   HEMATOCRIT % 25 0*   PLATELETS Thousands/uL 275     Results from last 7 days   Lab Units 12/11/22  0533   SODIUM mmol/L 141   POTASSIUM mmol/L 3 6   CHLORIDE mmol/L 105   CO2 mmol/L 28   BUN mg/dL 12   CREATININE mg/dL 0 80   ANION GAP mmol/L 8   CALCIUM mg/dL 8 4   GLUCOSE RANDOM mg/dL 150*     Results from last 7 days   Lab Units 12/17/22  0430   INR  1 88*     Results from last 7 days   Lab Units 12/17/22  0804 12/16/22  2117 12/16/22  1549 12/16/22  1352 12/16/22  1242 12/16/22  0737 12/15/22  2057 12/15/22  1550 12/15/22  1129 12/15/22  0740 12/14/22  2113 12/14/22  1627   POC GLUCOSE mg/dl 152* 116 175* 175* 154* 169* 177* 167* 202* 182* 189* 136                   * I Have Reviewed All Lab Data Listed Above  * Additional Pertinent Lab Tests Reviewed:  All Labs Within Last 24 Hours Reviewed    Imaging:    Imaging Reports Reviewed Today Include: None      Recent Cultures (last 7 days):           Last 24 Hours Medication List:   Current Facility-Administered Medications   Medication Dose Route Frequency Provider Last Rate   • acetaminophen  650 mg Oral Q6H PRN Balwinder Hubbard MD     • ALPRAZolam  0 25 mg Oral Daily PRN Balwinder Hubbard MD     • aluminum-magnesium hydroxide-simethicone  30 mL Oral Q4H PRN Balwinder Hubbard MD     • amLODIPine  10 mg Oral Daily Balwinder Hubbard MD     • atorvastatin  40 mg Oral Daily With Kristal Garza MD     • buPROPion  150 mg Oral Daily Balwinder Hubbard MD     • clopidogrel  75 mg Oral Daily Balwinder Hubbard MD     • collagenase   Topical Daily Balwinder Hubbard MD     • doxazosin  4 mg Oral HS Balwinder Hubbard MD     • gabapentin  400 mg Oral TID Balwinder Hubbard MD     • insulin glargine  8 Units Subcutaneous HS Balwinder Hubbard MD     • insulin lispro  1-6 Units Subcutaneous TID [facility] Viola Nails MD     • labetalol  10 mg Intravenous Q6H PRN Viola Nails MD     • lisinopril  40 mg Oral Daily Viola Nails MD     • methocarbamol  1,000 mg Oral [facility] Viola Nails MD     • metoclopramide  10 mg Intravenous Q6H PRN Viola Nails MD     • metoprolol succinate  25 mg Oral Daily Viola Nails MD     • naloxone  0 04 mg Intravenous Q1MIN PRN Viola Nails MD     • nystatin   Topical BID Viola Nails MD     • ondansetron  4 mg Intravenous Q6H PRN Viola Nails MD     • oxyCODONE  10 mg Oral Q12H [address] Viola Nails MD     • oxyCODONE  2 5 mg Oral Q4H PRN Viola Nails MD      Or   • oxyCODONE  5 mg Oral Q4H PRN Viola Nails MD      Or   • oxyCODONE  7 5 mg Oral Q4H PRN Viola Nails MD     • pantoprazole  40 mg Oral Early Morning Viola Nails MD     • polyethylene glycol  17 g Oral BID Viola Nails MD     • QUEtiapine  25 mg Oral HS Viola Nails MD     • senna  1 tablet Oral BID Viola Nails MD     • sertraline  175 mg Oral Daily Viola Nails MD     • warfarin  4 mg Oral Daily (warfarin) Viola Nails MD          Today, Patient Was Seen By: Remedios Madrid MD    ** Please Note: Dictation voice to text software may have been used in the creation of this document   **

## 2022-12-18 ENCOUNTER — ANESTHESIA EVENT (INPATIENT)
Dept: PERIOP | Facility: HOSPITAL | Age: 62
End: 2022-12-18

## 2022-12-18 VITALS
HEART RATE: 85 BPM | OXYGEN SATURATION: 96 % | WEIGHT: 166.45 LBS | SYSTOLIC BLOOD PRESSURE: 140 MMHG | RESPIRATION RATE: 20 BRPM | TEMPERATURE: 98.2 F | DIASTOLIC BLOOD PRESSURE: 80 MMHG | HEIGHT: 64 IN | BODY MASS INDEX: 28.42 KG/M2

## 2022-12-18 LAB
ERYTHROCYTE [DISTWIDTH] IN BLOOD BY AUTOMATED COUNT: 16.8 % (ref 11.6–15.1)
GLUCOSE SERPL-MCNC: 145 MG/DL (ref 65–140)
GLUCOSE SERPL-MCNC: 147 MG/DL (ref 65–140)
GLUCOSE SERPL-MCNC: 165 MG/DL (ref 65–140)
GLUCOSE SERPL-MCNC: 194 MG/DL (ref 65–140)
HCT VFR BLD AUTO: 25.4 % (ref 34.8–46.1)
HGB BLD-MCNC: 7.8 G/DL (ref 11.5–15.4)
INR PPP: 2.29 (ref 0.84–1.19)
MCH RBC QN AUTO: 26.4 PG (ref 26.8–34.3)
MCHC RBC AUTO-ENTMCNC: 30.7 G/DL (ref 31.4–37.4)
MCV RBC AUTO: 86 FL (ref 82–98)
PLATELET # BLD AUTO: 249 THOUSANDS/UL (ref 149–390)
PMV BLD AUTO: 7.9 FL (ref 8.9–12.7)
PROTHROMBIN TIME: 25.1 SECONDS (ref 11.6–14.5)
RBC # BLD AUTO: 2.95 MILLION/UL (ref 3.81–5.12)
WBC # BLD AUTO: 7.28 THOUSAND/UL (ref 4.31–10.16)

## 2022-12-18 PROCEDURE — 85610 PROTHROMBIN TIME: CPT | Performed by: SURGERY

## 2022-12-18 PROCEDURE — 99232 SBSQ HOSP IP/OBS MODERATE 35: CPT | Performed by: INTERNAL MEDICINE

## 2022-12-18 PROCEDURE — 85027 COMPLETE CBC AUTOMATED: CPT | Performed by: SURGERY

## 2022-12-18 PROCEDURE — 99024 POSTOP FOLLOW-UP VISIT: CPT | Performed by: SURGERY

## 2022-12-18 PROCEDURE — 82948 REAGENT STRIP/BLOOD GLUCOSE: CPT

## 2022-12-18 RX ORDER — SODIUM CHLORIDE 9 MG/ML
100 INJECTION, SOLUTION INTRAVENOUS CONTINUOUS
Status: DISCONTINUED | OUTPATIENT
Start: 2022-12-19 | End: 2022-12-20

## 2022-12-18 RX ORDER — CEFAZOLIN SODIUM 2 G/50ML
2000 SOLUTION INTRAVENOUS
Status: DISCONTINUED | OUTPATIENT
Start: 2022-12-19 | End: 2022-12-20 | Stop reason: ALTCHOICE

## 2022-12-18 RX ORDER — INSULIN GLARGINE 100 [IU]/ML
10 INJECTION, SOLUTION SUBCUTANEOUS
Status: DISCONTINUED | OUTPATIENT
Start: 2022-12-18 | End: 2023-01-12 | Stop reason: HOSPADM

## 2022-12-18 RX ADMIN — OXYCODONE HYDROCHLORIDE 10 MG: 10 TABLET, FILM COATED, EXTENDED RELEASE ORAL at 21:39

## 2022-12-18 RX ADMIN — NYSTATIN: 100000 POWDER TOPICAL at 18:06

## 2022-12-18 RX ADMIN — CLOPIDOGREL BISULFATE 75 MG: 75 TABLET ORAL at 08:53

## 2022-12-18 RX ADMIN — WARFARIN SODIUM 4 MG: 2 TABLET ORAL at 18:03

## 2022-12-18 RX ADMIN — LISINOPRIL 40 MG: 20 TABLET ORAL at 08:53

## 2022-12-18 RX ADMIN — AMLODIPINE BESYLATE 10 MG: 10 TABLET ORAL at 08:53

## 2022-12-18 RX ADMIN — GABAPENTIN 400 MG: 400 CAPSULE ORAL at 08:53

## 2022-12-18 RX ADMIN — ATORVASTATIN CALCIUM 40 MG: 40 TABLET, FILM COATED ORAL at 16:43

## 2022-12-18 RX ADMIN — QUETIAPINE FUMARATE 25 MG: 25 TABLET ORAL at 21:39

## 2022-12-18 RX ADMIN — INSULIN LISPRO 1 UNITS: 100 INJECTION, SOLUTION INTRAVENOUS; SUBCUTANEOUS at 16:43

## 2022-12-18 RX ADMIN — GABAPENTIN 400 MG: 400 CAPSULE ORAL at 21:40

## 2022-12-18 RX ADMIN — METHOCARBAMOL 1000 MG: 500 TABLET ORAL at 13:43

## 2022-12-18 RX ADMIN — INSULIN GLARGINE 10 UNITS: 100 INJECTION, SOLUTION SUBCUTANEOUS at 21:39

## 2022-12-18 RX ADMIN — METHOCARBAMOL 1000 MG: 500 TABLET ORAL at 21:39

## 2022-12-18 RX ADMIN — OXYCODONE HYDROCHLORIDE 10 MG: 10 TABLET, FILM COATED, EXTENDED RELEASE ORAL at 08:53

## 2022-12-18 RX ADMIN — NYSTATIN: 100000 POWDER TOPICAL at 08:54

## 2022-12-18 RX ADMIN — GABAPENTIN 400 MG: 400 CAPSULE ORAL at 16:43

## 2022-12-18 RX ADMIN — PANTOPRAZOLE SODIUM 40 MG: 40 TABLET, DELAYED RELEASE ORAL at 05:44

## 2022-12-18 RX ADMIN — METHOCARBAMOL 1000 MG: 500 TABLET ORAL at 05:44

## 2022-12-18 RX ADMIN — SERTRALINE HYDROCHLORIDE 175 MG: 100 TABLET ORAL at 08:54

## 2022-12-18 RX ADMIN — DOXAZOSIN 4 MG: 4 TABLET ORAL at 21:40

## 2022-12-18 RX ADMIN — INSULIN LISPRO 1 UNITS: 100 INJECTION, SOLUTION INTRAVENOUS; SUBCUTANEOUS at 11:31

## 2022-12-18 RX ADMIN — METOPROLOL SUCCINATE 25 MG: 25 TABLET, EXTENDED RELEASE ORAL at 08:53

## 2022-12-18 RX ADMIN — BUPROPION HYDROCHLORIDE 150 MG: 150 TABLET, FILM COATED, EXTENDED RELEASE ORAL at 08:53

## 2022-12-18 NOTE — PROGRESS NOTES
[facility]  Progress Note - [address] 1960, 65 y o  female MRN: [id number]  Unit/Bed#: E4 -01 Encounter: [id number]  Primary Care Provider: ALEX Morocho   Date and time admitted to hospital: 10/21/2022  7:58 PM    * PAD (peripheral artery disease) [facility])  Assessment & Plan  65year old female former smoker w/HTN, HLD, uncontrolled type II DM (A1c 9 8), hx CVA, presenting with nonhealing extensive L heel ulceration and R hallux and 5th digit ulceration  Vascular surgery consulted for input     - Bilateral tissue loss in the setting of uncontrolled type II DM and NYDIA on admission, now s/p multiple angiograms w/intervention    - Endovascular interventions, c/b atheroembolic event to the RLE and JUSTIN with R hemispheric CVA    S/p L AKA 11/23/22 [facility])  S/p R AKA, wound debridement 12/1/22 ([facility])  S/p 12/16 [facility] and [facility]    Recommendations:  - Plastic surgery recommended daily santyl to R thigh wounds  - Continue warfarin, plavix, statin (INR sub-therapeutic at 1 88 on 12/17)  - Medical management and glucose control per SLIM  - Continue oral pain regimen, adjusted to long acting which appears to be controlling patient's pain well   - Appreciate psych input and management (on Wellbutrin and Zoloft)  - plan for OR washout and Vac change Monday, 12/19        Subjective: No acute events overnight  Patient reports pain control is adequate  Left AKA stump dressing was changed this morning, with no pus expressed from the wound  Vitals:  /72 (BP Location: Left arm)   Pulse 83   Temp (!) 97 3 °F (36 3 °C) (Temporal)   Resp 18   Ht 5' 4" (1 626 m)   Wt 75 5 kg (166 lb 7 2 oz)   SpO2 95%   BMI 28 57 kg/m²     I/Os:  I/O last 3 completed shifts: In: 1320 [P O :1320]  Out: 925 [Urine:725; Drains:200]  No intake/output data recorded      Lab Results and Cultures:   CBC with diff:   Lab Results   Component Value Date    WBC 11 18 (H) 12/17/2022    HGB 7 7 (L) 12/17/2022    HCT 25 0 (L) 12/17/2022    MCV 87 12/17/2022     12/17/2022    MCH 26 8 12/17/2022    MCHC 30 8 (L) 12/17/2022    RDW 16 9 (H) 12/17/2022    MPV 8 2 (L) 12/17/2022    NRBC 0 12/05/2022   ,   BMP/CMP:  Lab Results   Component Value Date    SODIUM 141 12/11/2022    K 3 6 12/11/2022     12/11/2022    CO2 28 12/11/2022    BUN 12 12/11/2022    CREATININE 0 80 12/11/2022    CALCIUM 8 4 12/11/2022    AST 25 12/02/2022    ALT 12 12/02/2022    ALKPHOS 318 (H) 12/02/2022    EGFR 79 12/11/2022   ,   Lipid Panel: No results found for: CHOL,   Coags:   Lab Results   Component Value Date    PTT 95 (H) 12/12/2022    INR 1 88 (H) 12/17/2022   ,     Blood Culture:   Lab Results   Component Value Date    BLOODCX No Growth After 5 Days   11/13/2022    BLOODCX Staphylococcus coagulase negative (A) 11/13/2022   ,   Urinalysis:   Lab Results   Component Value Date    COLORU Yellow 11/12/2022    CLARITYU Slightly Cloudy 11/12/2022    SPECGRAV 1 025 11/12/2022    PHUR 5 5 11/12/2022    PHUR 6 5 02/23/2018    LEUKOCYTESUR Trace (A) 11/12/2022    NITRITE Positive (A) 11/12/2022    GLUCOSEU Negative 11/12/2022    KETONESU Negative 11/12/2022    BILIRUBINUR Negative 11/12/2022    BLOODU Large (A) 11/12/2022   ,   Urine Culture:   Lab Results   Component Value Date    URINECX >100,000 cfu/ml Enterobacter cloacae complex (A) 11/12/2022    URINECX 70,000-79,000 cfu/ml Kluyveromyces marxianus (A) 11/12/2022   ,   Wound Culure: No results found for: WOUNDCULT    Medications:  Current Facility-Administered Medications   Medication Dose Route Frequency   • acetaminophen (TYLENOL) tablet 650 mg  650 mg Oral Q6H PRN   • ALPRAZolam (XANAX) tablet 0 25 mg  0 25 mg Oral Daily PRN   • aluminum-magnesium hydroxide-simethicone (MYLANTA) oral suspension 30 mL  30 mL Oral Q4H PRN   • amLODIPine (NORVASC) tablet 10 mg  10 mg Oral Daily   • atorvastatin (LIPITOR) tablet 40 mg  40 mg Oral Daily With Dinner   • buPROPion (WELLBUTRIN XL) 24 hr tablet 150 mg  150 mg Oral Daily   • clopidogrel (PLAVIX) tablet 75 mg  75 mg Oral Daily   • collagenase (SANTYL) ointment   Topical Daily   • doxazosin (CARDURA) tablet 4 mg  4 mg Oral HS   • gabapentin (NEURONTIN) capsule 400 mg  400 mg Oral TID   • insulin glargine (LANTUS) subcutaneous injection 8 Units 0 08 mL  8 Units Subcutaneous HS   • insulin lispro (HumaLOG) 100 units/mL subcutaneous injection 1-6 Units  1-6 Units Subcutaneous TID AC   • labetalol (NORMODYNE) injection 10 mg  10 mg Intravenous Q6H PRN   • lisinopril (ZESTRIL) tablet 40 mg  40 mg Oral Daily   • methocarbamol (ROBAXIN) tablet 1,000 mg  1,000 mg Oral Q8H [facility]   • metoclopramide (REGLAN) injection 10 mg  10 mg Intravenous Q6H PRN   • metoprolol succinate (TOPROL-XL) 24 hr tablet 25 mg  25 mg Oral Daily   • naloxone (NARCAN) 0 04 mg/mL syringe 0 04 mg  0 04 mg Intravenous Q1MIN PRN   • nystatin (MYCOSTATIN) powder   Topical BID   • ondansetron (ZOFRAN) injection 4 mg  4 mg Intravenous Q6H PRN   • oxyCODONE (OxyCONTIN) 12 hr tablet 10 mg  10 mg Oral Q12H MICHELLE   • oxyCODONE (ROXICODONE) IR tablet 2 5 mg  2 5 mg Oral Q4H PRN    Or   • oxyCODONE (ROXICODONE) IR tablet 5 mg  5 mg Oral Q4H PRN    Or   • oxyCODONE (ROXICODONE) IR tablet 7 5 mg  7 5 mg Oral Q4H PRN   • pantoprazole (PROTONIX) EC tablet 40 mg  40 mg Oral Early Morning   • polyethylene glycol (MIRALAX) packet 17 g  17 g Oral BID   • QUEtiapine (SEROquel) tablet 25 mg  25 mg Oral HS   • senna (SENOKOT) tablet 8 6 mg  1 tablet Oral BID   • sertraline (ZOLOFT) tablet 175 mg  175 mg Oral Daily   • warfarin (COUMADIN) tablet 4 mg  4 mg Oral Daily (warfarin)       Imaging:  Reviewed  Physical Exam  Vitals reviewed  Constitutional:       General: She is not in acute distress  Appearance: She is not ill-appearing  Cardiovascular:      Rate and Rhythm: Normal rate and regular rhythm     Pulmonary:      Effort: Pulmonary effort is normal  No respiratory distress  Abdominal:      General: There is no distension  Palpations: Abdomen is soft  Musculoskeletal:      Comments: Lateral AKA stumps intact, wound VAC on right leg suction, no surrounding erythema or induration  Left AKA stump with gauze and ABD covering, serosanguinous drainage   Neurological:      Mental Status: She is alert               Johnnie Bray MD  12/18/2022

## 2022-12-18 NOTE — ASSESSMENT & PLAN NOTE
Lab Results   Component Value Date    HGBA1C 9 8 (H) 10/25/2022     Recent Labs     12/17/22  1604 12/17/22  2102 12/18/22  0720 12/18/22  1119   POCGLU 188* 175* 145* 194*     Significantly reduced regimen from home due to poor oral intake  Goal sugar < 180  Increase Lantus to 10 units at night  Continue sliding scale insulin

## 2022-12-18 NOTE — ASSESSMENT & PLAN NOTE
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

## 2022-12-18 NOTE — ASSESSMENT & PLAN NOTE
· She has had multiple  blood transfusions during this admission  · Blood transfusion was on  12/15/2022 prior to her surgery    · Monitor

## 2022-12-18 NOTE — PLAN OF CARE
Problem: Potential for Falls  Goal: Patient will remain free of falls  Description: INTERVENTIONS:  - Educate patient/family on patient safety including physical limitations  - Instruct patient to call for assistance with activity   - Consult OT/PT to assist with strengthening/mobility   - Keep Call bell within reach  - Keep bed low and locked with side rails adjusted as appropriate  - Keep care items and personal belongings within reach  - Initiate and maintain comfort rounds  - Make Fall Risk Sign visible to staff  - Offer Toileting every 2 Hours, in advance of need  - Initiate/Maintain bed  chair alarm  - Obtain necessary fall risk management equipment: bed chair alarm, call bell, gripper sock, walker, bedside commode  - Apply yellow socks and bracelet for high fall risk patients  - Consider moving patient to room near nurses station  Outcome: Progressing     Problem: MOBILITY - ADULT  Goal: Maintain or return to baseline ADL function  Description: INTERVENTIONS:  -  Assess patient's ability to carry out ADLs; assess patient's baseline for ADL function and identify physical deficits which impact ability to perform ADLs (bathing, care of mouth/teeth, toileting, grooming, dressing, etc )  - Assess/evaluate cause of self-care deficits   - Assess range of motion  - Assess patient's mobility; develop plan if impaired  - Assess patient's need for assistive devices and provide as appropriate  - Encourage maximum independence but intervene and supervise when necessary  - Involve family in performance of ADLs  - Assess for home care needs following discharge   - Consider OT consult to assist with ADL evaluation and planning for discharge  - Provide patient education as appropriate  Outcome: Progressing  Goal: Maintains/Returns to pre admission functional level  Description: INTERVENTIONS:  - Perform BMAT or MOVE assessment daily    - Set and communicate daily mobility goal to care team and patient/family/caregiver     - Collaborate with rehabilitation services on mobility goals if consulted  - Assist patient in repositioning every 2 hours    - Dangle patient 3 times a day  - Stand patient 3 times a day  - Out of bed to chair as tolerated  - Out of bed for meals  - Out of bed for toileting  - Record patient progress and toleration of activity level   Outcome: Progressing     Problem: PAIN - ADULT  Goal: Verbalizes/displays adequate comfort level or baseline comfort level  Description: Interventions:  - Encourage patient to monitor pain and request assistance  - Assess pain using appropriate pain scale  - Administer analgesics based on type and severity of pain and evaluate response  - Implement non-pharmacological measures as appropriate and evaluate response  - Consider cultural and social influences on pain and pain management  - Notify physician/advanced practitioner if interventions unsuccessful or patient reports new pain  Outcome: Progressing     Problem: INFECTION - ADULT  Goal: Absence or prevention of progression during hospitalization  Description: INTERVENTIONS:  - Assess and monitor for signs and symptoms of infection  - Monitor lab/diagnostic results  - Monitor all insertion sites, i e  indwelling lines, tubes, and drains  - Administer medications as ordered  - Instruct and encourage patient and family to use good hand hygiene technique  Outcome: Progressing     Problem: SAFETY ADULT  Goal: Patient will remain free of falls  Description: INTERVENTIONS:  - Educate patient/family on patient safety including physical limitations  - Instruct patient to call for assistance with activity   - Consult OT/PT to assist with strengthening/mobility   - Keep Call bell within reach  - Keep bed low and locked with side rails adjusted as appropriate  - Keep care items and personal belongings within reach  - Initiate and maintain comfort rounds  - Make Fall Risk Sign visible to staff  - Offer Toileting every 2 Hours, in advance of need  - Initiate/Maintain bed  chair alarm  - Obtain necessary fall risk management equipment: bed chair alarm, call bell, gripper sock, walker, bedside commode  - Apply yellow socks and bracelet for high fall risk patients  - Consider moving patient to room near nurses station  Outcome: Progressing  Goal: Maintain or return to baseline ADL function  Description: INTERVENTIONS:  -  Assess patient's ability to carry out ADLs; assess patient's baseline for ADL function and identify physical deficits which impact ability to perform ADLs (bathing, care of mouth/teeth, toileting, grooming, dressing, etc )  - Assess/evaluate cause of self-care deficits   - Assess range of motion  - Assess patient's mobility; develop plan if impaired  - Assess patient's need for assistive devices and provide as appropriate  - Encourage maximum independence but intervene and supervise when necessary  - Involve family in performance of ADLs  - Assess for home care needs following discharge   - Consider OT consult to assist with ADL evaluation and planning for discharge  - Provide patient education as appropriate  Outcome: Progressing  Goal: Maintains/Returns to pre admission functional level  Description: INTERVENTIONS:  - Perform BMAT or MOVE assessment daily    - Set and communicate daily mobility goal to care team and patient/family/caregiver  - Collaborate with rehabilitation services on mobility goals if consulted  - Assist patient in repositioning every 2 hours    - Dangle patient 3 times a day  - Stand patient 3 times a day  - Out of bed to chair as tolerated  - Out of bed for meals  - Out of bed for toileting  - Record patient progress and toleration of activity level   Outcome: Progressing     Problem: DISCHARGE PLANNING  Goal: Discharge to home or other facility with appropriate resources  Description: INTERVENTIONS:  - Identify barriers to discharge w/patient   - Arrange for needed discharge resources and transportation as appropriate  - Identify discharge learning needs  - Refer to Case Management Department for coordinating discharge planning if the patient needs post-hospital services based on physician/advanced practitioner order   Outcome: Progressing     Problem: Knowledge Deficit  Goal: Patient/family/caregiver demonstrates understanding of disease process, treatment plan, medications, and discharge instructions  Description: Complete learning assessment and assess knowledge base    Interventions:  - Provide teaching at level of understanding  - Provide teaching via preferred learning methods  Outcome: Progressing     Problem: METABOLIC, FLUID AND ELECTROLYTES - ADULT  Goal: Electrolytes maintained within normal limits  Description: INTERVENTIONS:  - Monitor labs and assess patient for signs and symptoms of electrolyte imbalances  - Administer electrolyte replacement as ordered  - Monitor response to electrolyte replacements, including repeat lab results as appropriate  - Instruct patient on fluid and nutrition as appropriate  Outcome: Progressing  Goal: Fluid balance maintained  Description: INTERVENTIONS:  - Monitor labs   - Monitor I/O and WT  - Instruct patient on fluid and nutrition as appropriate  - Assess for signs & symptoms of volume excess or deficit  Outcome: Progressing  Goal: Glucose maintained within target range  Description: INTERVENTIONS:  - Monitor Blood Glucose as ordered  - Assess for signs and symptoms of hyperglycemia and hypoglycemia  - Administer ordered medications to maintain glucose within target range  - Assess nutritional intake and initiate nutrition service referral as needed  Outcome: Progressing     Problem: SKIN/TISSUE INTEGRITY - ADULT  Goal: Skin Integrity remains intact(Skin Breakdown Prevention)  Description: Assess:  -Perform Nicanor assessment every shift  -Clean and moisturize skin every shift  -Inspect skin when repositioning, toileting, and assisting with ADLS  -Assess under medical devices   -Assess extremities for adequate circulation and sensation     Bed Management:  -Have minimal linens on bed & keep smooth, unwrinkled  -Change linens as needed when moist or perspiring  -Avoid sitting or lying in one position for more than 2 hours while in bed    Toileting:  -Offer bedside commode  -Assess for incontinence every 2 hours  -Use incontinent care products after each incontinent episode     Activity:  -Mobilize patient 3 times a day  -Encourage or provide ROM exercises   -Turn and reposition patient every 2 Hours  -Use appropriate equipment to lift or move patient in bed  -Instruct/ Assist with weight shifting every 15 minutes when out of bed in chair  -Consider limitation of chair time 1 hour intervals    Skin Care:  -Avoid use of baby powder, tape, friction and shearing, hot water or constrictive clothing  -Relieve pressure over bony prominences using waffle cushion when in chair  -Do not massage red bony areas    Outcome: Progressing  Goal: Incision(s), wounds(s) or drain site(s) healing without S/S of infection  Description: INTERVENTIONS  - Assess and document dressing, incision, wound bed, drain sites and surrounding tissue  - Provide patient and family education  - Perform skin care/dressing changes everyday as ordered  Outcome: Progressing  Goal: Pressure injury heals and does not worsen  Description: Interventions:  - Implement low air loss mattress or specialty surface (Criteria met)  - Apply silicone foam dressing  - Instruct/assist with weight shifting every 15 minutes when in chair   - Limit chair time to 1 hour intervals  - Use special pressure reducing interventions such as waffle cushion when in chair   - Perform passive or active ROM   - Turn and reposition patient & offload bony prominences every 2 hours   - Utilize friction reducing device or surface for transfers   - Consider consults to  interdisciplinary teams such as wound care, PT/OT  - Use incontinent care products after each incontinent episode   - Consider nutrition services referral as needed  Outcome: Progressing     Problem: MUSCULOSKELETAL - ADULT  Goal: Maintain or return mobility to safest level of function  Description: INTERVENTIONS:  - Assess patient's ability to carry out ADLs; assess patient's baseline for ADL function and identify physical deficits which impact ability to perform ADLs (bathing, care of mouth/teeth, toileting, grooming, dressing, etc )  - Assess/evaluate cause of self-care deficits   - Assess range of motion  - Assess patient's mobility  - Assess patient's need for assistive devices and provide as appropriate  - Encourage maximum independence but intervene and supervise when necessary  - Involve family in performance of ADLs  - Assess for home care needs following discharge   - Consider OT consult to assist with ADL evaluation and planning for discharge  - Provide patient education as appropriate  Outcome: Progressing  Goal: Maintain proper alignment of affected body part  Description: INTERVENTIONS:  - Support, maintain and protect limb and body alignment  - Provide patient/ family with appropriate education  Outcome: Progressing     Problem: CARDIOVASCULAR - ADULT  Goal: Maintains optimal cardiac output and hemodynamic stability  Description: INTERVENTIONS:  - Monitor I/O, vital signs and rhythm  - Monitor for S/S and trends of decreased cardiac output  - Administer and titrate ordered vasoactive medications to optimize hemodynamic stability  - Assess quality of pulses, skin color and temperature  - Assess for signs of decreased coronary artery perfusion  - Instruct patient to report change in severity of symptoms  Outcome: Progressing     Problem: RESPIRATORY - ADULT  Goal: Achieves optimal ventilation and oxygenation  Description: INTERVENTIONS:  - Assess for changes in respiratory status  - Assess for changes in mentation and behavior  - Position to facilitate oxygenation and minimize respiratory effort  - Oxygen administered by appropriate delivery if ordered  - Initiate smoking cessation education as indicated  - Encourage broncho-pulmonary hygiene including cough, deep breathe, Incentive Spirometry  - Assess the need for suctioning and aspirate as needed  - Assess and instruct to report SOB or any respiratory difficulty  - Respiratory Therapy support as indicated  Outcome: Progressing     Problem: Prexisting or High Potential for Compromised Skin Integrity  Goal: Skin integrity is maintained or improved  Description: INTERVENTIONS:  - Identify patients at risk for skin breakdown  - Assess and monitor skin integrity  - Assess and monitor nutrition and hydration status  - Monitor labs   - Assess for incontinence   - Turn and reposition patient  - Assist with mobility/ambulation  - Relieve pressure over bony prominences  - Avoid friction and shearing  - Provide appropriate hygiene as needed including keeping skin clean and dry  - Evaluate need for skin moisturizer/barrier cream  - Collaborate with interdisciplinary team   - Patient/family teaching  - Consider wound care consult   Outcome: Progressing     Problem: Nutrition/Hydration-ADULT  Goal: Nutrient/Hydration intake appropriate for improving, restoring or maintaining nutritional needs  Description: Monitor and assess patient's nutrition/hydration status for malnutrition  Collaborate with interdisciplinary team and initiate plan and interventions as ordered  Monitor patient's weight and dietary intake as ordered or per policy  Utilize nutrition screening tool and intervene as necessary  Determine patient's food preferences and provide high-protein, high-caloric foods as appropriate       INTERVENTIONS:  - Monitor oral intake, urinary output, labs, and treatment plans  - Assess nutrition and hydration status and recommend course of action  - Evaluate amount of meals eaten  - Assist patient with eating if necessary   - Allow adequate time for meals  - Recommend/ encourage appropriate diets, oral nutritional supplements, and vitamin/mineral supplements  - Order, calculate, and assess calorie counts as needed  - Recommend, monitor, and adjust tube feedings and TPN/PPN based on assessed needs  - Assess need for intravenous fluids  - Provide specific nutrition/hydration education as appropriate  - Include patient/family/caregiver in decisions related to nutrition  Outcome: Progressing     Problem: GENITOURINARY - ADULT  Goal: Maintains or returns to baseline urinary function  Description: INTERVENTIONS:  - Assess urinary function  - Encourage oral fluids to ensure adequate hydration if ordered  - Administer IV fluids as ordered to ensure adequate hydration  - Administer ordered medications as needed  - Offer frequent toileting  - Follow urinary retention protocol if ordered  Outcome: Progressing  Goal: Absence of urinary retention  Description: INTERVENTIONS:  - Assess patient’s ability to void and empty bladder  - Monitor I/O  - Bladder scan as needed  - Discuss with physician/AP medications to alleviate retention as needed  - Discuss catheterization for long term situations as appropriate  Outcome: Progressing     Problem: HEMATOLOGIC - ADULT  Goal: Maintains hematologic stability  Description: INTERVENTIONS  - Assess for signs and symptoms of bleeding or hemorrhage  - Monitor labs  - Administer supportive blood products/factors as ordered and appropriate  Outcome: Progressing

## 2022-12-18 NOTE — ASSESSMENT & PLAN NOTE
· She was initially admitted for nonhealing wound of the left lower extremity, requiring multiple endovascular interventions and ultimately underwent left AKA on 11/23/2022 and right AKA on 12/1/22  · S/p right stump debridement and vac placement 12/16/22  · For additional right stump washout and VAC exchange tomorrow 12/19/2022  per surgery    · Continue pain control  · Eventual short-term rehab placement when medically stable  · Management per primary service

## 2022-12-18 NOTE — ASSESSMENT & PLAN NOTE
· Positive heparin antibody on 11/11/2022  · Completed 5 days of overlap with argatroban and Coumadin   · Continue coumadin tonight  · INR therapeutic  · Goal INR 2-3

## 2022-12-18 NOTE — ANESTHESIA PREPROCEDURE EVALUATION
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 (HCC)   (+) Uncontrolled type 2 diabetes with neuropathy      GI/HEPATIC   (+) Esophageal reflux      HEMATOLOGY   (+) Acute on chronic anemia   (+) HIT (heparin-induced thrombocytopenia)      MUSCULOSKELETAL   (+) Arthritis      NEURO/PSYCH   (+) Anxiety   (+) CVA (cerebral vascular accident) ([address] )   (+) Depression, recurrent (HCC)   (+) Diabetic peripheral neuropathy (HCC)   (+) Stroke [facility])        Physical Exam    Airway    Mallampati score: II  TM Distance: >3 FB  Neck ROM: full     Dental   upper dentures and lower dentures,     Cardiovascular  Rhythm: regular, Rate: normal, Cardiovascular exam normal    Pulmonary  Pulmonary exam normal Breath sounds clear to auscultation,     Other Findings        Anesthesia Plan  ASA Score- 3     Anesthesia Type- general with ASA Monitors  Additional Monitors:   Airway Plan: ETT and LMA  Comment: ETT vs LMA  Plan Factors-    Chart reviewed  EKG reviewed  Imaging results reviewed  Existing labs reviewed  Patient summary reviewed  Induction-     Postoperative Plan-     Informed Consent- Anesthetic plan and risks discussed with patient

## 2022-12-19 ENCOUNTER — ANESTHESIA (INPATIENT)
Dept: PERIOP | Facility: HOSPITAL | Age: 62
End: 2022-12-19

## 2022-12-19 LAB
ERYTHROCYTE [DISTWIDTH] IN BLOOD BY AUTOMATED COUNT: 16.6 % (ref 11.6–15.1)
GLUCOSE SERPL-MCNC: 126 MG/DL (ref 65–140)
GLUCOSE SERPL-MCNC: 128 MG/DL (ref 65–140)
GLUCOSE SERPL-MCNC: 135 MG/DL (ref 65–140)
GLUCOSE SERPL-MCNC: 136 MG/DL (ref 65–140)
HCT VFR BLD AUTO: 25.5 % (ref 34.8–46.1)
HGB BLD-MCNC: 7.5 G/DL (ref 11.5–15.4)
INR PPP: 2.19 (ref 0.84–1.19)
MCH RBC QN AUTO: 26.1 PG (ref 26.8–34.3)
MCHC RBC AUTO-ENTMCNC: 29.4 G/DL (ref 31.4–37.4)
MCV RBC AUTO: 89 FL (ref 82–98)
PLATELET # BLD AUTO: 262 THOUSANDS/UL (ref 149–390)
PMV BLD AUTO: 8.1 FL (ref 8.9–12.7)
PROTHROMBIN TIME: 24.2 SECONDS (ref 11.6–14.5)
RBC # BLD AUTO: 2.87 MILLION/UL (ref 3.81–5.12)
WBC # BLD AUTO: 6.95 THOUSAND/UL (ref 4.31–10.16)

## 2022-12-19 PROCEDURE — 97110 THERAPEUTIC EXERCISES: CPT

## 2022-12-19 PROCEDURE — 97606 NEG PRS WND THER DME>50 SQCM: CPT | Performed by: SURGERY

## 2022-12-19 PROCEDURE — 99233 SBSQ HOSP IP/OBS HIGH 50: CPT | Performed by: INTERNAL MEDICINE

## 2022-12-19 PROCEDURE — 97535 SELF CARE MNGMENT TRAINING: CPT

## 2022-12-19 PROCEDURE — 87150 DNA/RNA AMPLIFIED PROBE: CPT | Performed by: SURGERY

## 2022-12-19 PROCEDURE — 3E10X8Z IRRIGATION OF SKIN AND MUCOUS MEMBRANES USING IRRIGATING SUBSTANCE: ICD-10-PCS | Performed by: SURGERY

## 2022-12-19 PROCEDURE — 87186 SC STD MICRODIL/AGAR DIL: CPT | Performed by: SURGERY

## 2022-12-19 PROCEDURE — 2W4PX5Z PACKING OF LEFT UPPER LEG USING PACKING MATERIAL: ICD-10-PCS | Performed by: SURGERY

## 2022-12-19 PROCEDURE — 87077 CULTURE AEROBIC IDENTIFY: CPT | Performed by: SURGERY

## 2022-12-19 PROCEDURE — 82948 REAGENT STRIP/BLOOD GLUCOSE: CPT

## 2022-12-19 PROCEDURE — 97530 THERAPEUTIC ACTIVITIES: CPT

## 2022-12-19 PROCEDURE — 85610 PROTHROMBIN TIME: CPT | Performed by: SURGERY

## 2022-12-19 PROCEDURE — 87070 CULTURE OTHR SPECIMN AEROBIC: CPT | Performed by: SURGERY

## 2022-12-19 PROCEDURE — 85027 COMPLETE CBC AUTOMATED: CPT | Performed by: SURGERY

## 2022-12-19 PROCEDURE — 2W0NX6Z CHANGE PRESSURE DRESSING ON RIGHT UPPER LEG: ICD-10-PCS | Performed by: SURGERY

## 2022-12-19 PROCEDURE — 87075 CULTR BACTERIA EXCEPT BLOOD: CPT | Performed by: SURGERY

## 2022-12-19 PROCEDURE — 87205 SMEAR GRAM STAIN: CPT | Performed by: SURGERY

## 2022-12-19 PROCEDURE — 99024 POSTOP FOLLOW-UP VISIT: CPT | Performed by: SURGERY

## 2022-12-19 PROCEDURE — 97597 DBRDMT OPN WND 1ST 20 CM/<: CPT | Performed by: SURGERY

## 2022-12-19 RX ORDER — FENTANYL CITRATE 50 UG/ML
INJECTION, SOLUTION INTRAMUSCULAR; INTRAVENOUS AS NEEDED
Status: DISCONTINUED | OUTPATIENT
Start: 2022-12-19 | End: 2022-12-19

## 2022-12-19 RX ORDER — FENTANYL CITRATE/PF 50 MCG/ML
25 SYRINGE (ML) INJECTION
Status: DISCONTINUED | OUTPATIENT
Start: 2022-12-19 | End: 2022-12-19 | Stop reason: HOSPADM

## 2022-12-19 RX ORDER — ONDANSETRON 2 MG/ML
INJECTION INTRAMUSCULAR; INTRAVENOUS AS NEEDED
Status: DISCONTINUED | OUTPATIENT
Start: 2022-12-19 | End: 2022-12-19

## 2022-12-19 RX ORDER — MAGNESIUM HYDROXIDE 1200 MG/15ML
LIQUID ORAL AS NEEDED
Status: DISCONTINUED | OUTPATIENT
Start: 2022-12-19 | End: 2022-12-19 | Stop reason: HOSPADM

## 2022-12-19 RX ORDER — LIDOCAINE HYDROCHLORIDE 10 MG/ML
INJECTION, SOLUTION EPIDURAL; INFILTRATION; INTRACAUDAL; PERINEURAL AS NEEDED
Status: DISCONTINUED | OUTPATIENT
Start: 2022-12-19 | End: 2022-12-19

## 2022-12-19 RX ORDER — ROCURONIUM BROMIDE 10 MG/ML
INJECTION, SOLUTION INTRAVENOUS AS NEEDED
Status: DISCONTINUED | OUTPATIENT
Start: 2022-12-19 | End: 2022-12-19

## 2022-12-19 RX ORDER — ONDANSETRON 2 MG/ML
4 INJECTION INTRAMUSCULAR; INTRAVENOUS ONCE AS NEEDED
Status: DISCONTINUED | OUTPATIENT
Start: 2022-12-19 | End: 2022-12-19 | Stop reason: HOSPADM

## 2022-12-19 RX ORDER — CEFAZOLIN SODIUM 2 G/50ML
2000 SOLUTION INTRAVENOUS ONCE
Status: COMPLETED | OUTPATIENT
Start: 2022-12-19 | End: 2022-12-19

## 2022-12-19 RX ORDER — CHLORHEXIDINE GLUCONATE 0.12 MG/ML
15 RINSE ORAL ONCE
Status: COMPLETED | OUTPATIENT
Start: 2022-12-19 | End: 2022-12-19

## 2022-12-19 RX ORDER — MIDAZOLAM HYDROCHLORIDE 2 MG/2ML
INJECTION, SOLUTION INTRAMUSCULAR; INTRAVENOUS AS NEEDED
Status: DISCONTINUED | OUTPATIENT
Start: 2022-12-19 | End: 2022-12-19

## 2022-12-19 RX ORDER — PROPOFOL 10 MG/ML
INJECTION, EMULSION INTRAVENOUS AS NEEDED
Status: DISCONTINUED | OUTPATIENT
Start: 2022-12-19 | End: 2022-12-19

## 2022-12-19 RX ADMIN — ROCURONIUM BROMIDE 30 MG: 10 INJECTION, SOLUTION INTRAVENOUS at 11:48

## 2022-12-19 RX ADMIN — ACETAMINOPHEN 650 MG: 325 TABLET, FILM COATED ORAL at 23:33

## 2022-12-19 RX ADMIN — METHOCARBAMOL 1000 MG: 500 TABLET ORAL at 22:14

## 2022-12-19 RX ADMIN — SUGAMMADEX 200 MG: 100 INJECTION, SOLUTION INTRAVENOUS at 12:26

## 2022-12-19 RX ADMIN — GABAPENTIN 400 MG: 400 CAPSULE ORAL at 08:50

## 2022-12-19 RX ADMIN — CHLORHEXIDINE GLUCONATE 0.12% ORAL RINSE 15 ML: 1.2 LIQUID ORAL at 10:20

## 2022-12-19 RX ADMIN — GABAPENTIN 400 MG: 400 CAPSULE ORAL at 17:42

## 2022-12-19 RX ADMIN — NYSTATIN: 100000 POWDER TOPICAL at 08:51

## 2022-12-19 RX ADMIN — OXYCODONE 5 MG: 5 TABLET ORAL at 17:43

## 2022-12-19 RX ADMIN — OXYCODONE HYDROCHLORIDE 10 MG: 10 TABLET, FILM COATED, EXTENDED RELEASE ORAL at 22:13

## 2022-12-19 RX ADMIN — AMLODIPINE BESYLATE 10 MG: 10 TABLET ORAL at 08:50

## 2022-12-19 RX ADMIN — POLYETHYLENE GLYCOL 3350 17 G: 17 POWDER, FOR SOLUTION ORAL at 08:50

## 2022-12-19 RX ADMIN — FENTANYL CITRATE 25 MCG: 50 INJECTION INTRAMUSCULAR; INTRAVENOUS at 12:24

## 2022-12-19 RX ADMIN — CLOPIDOGREL BISULFATE 75 MG: 75 TABLET ORAL at 08:50

## 2022-12-19 RX ADMIN — FENTANYL CITRATE 50 MCG: 50 INJECTION INTRAMUSCULAR; INTRAVENOUS at 11:48

## 2022-12-19 RX ADMIN — PROPOFOL 110 MG: 10 INJECTION, EMULSION INTRAVENOUS at 11:48

## 2022-12-19 RX ADMIN — CEFAZOLIN SODIUM 2000 MG: 2 SOLUTION INTRAVENOUS at 11:53

## 2022-12-19 RX ADMIN — WARFARIN SODIUM 4 MG: 2 TABLET ORAL at 17:42

## 2022-12-19 RX ADMIN — NYSTATIN: 100000 POWDER TOPICAL at 17:43

## 2022-12-19 RX ADMIN — METHOCARBAMOL 1000 MG: 500 TABLET ORAL at 06:00

## 2022-12-19 RX ADMIN — SERTRALINE HYDROCHLORIDE 175 MG: 100 TABLET ORAL at 08:49

## 2022-12-19 RX ADMIN — INSULIN GLARGINE 10 UNITS: 100 INJECTION, SOLUTION SUBCUTANEOUS at 22:14

## 2022-12-19 RX ADMIN — FENTANYL CITRATE 25 MCG: 50 INJECTION INTRAMUSCULAR; INTRAVENOUS at 12:08

## 2022-12-19 RX ADMIN — MIDAZOLAM 1 MG: 1 INJECTION INTRAMUSCULAR; INTRAVENOUS at 11:44

## 2022-12-19 RX ADMIN — LISINOPRIL 40 MG: 20 TABLET ORAL at 08:50

## 2022-12-19 RX ADMIN — DOXAZOSIN 4 MG: 4 TABLET ORAL at 22:14

## 2022-12-19 RX ADMIN — SODIUM CHLORIDE 100 ML/HR: 0.9 INJECTION, SOLUTION INTRAVENOUS at 00:15

## 2022-12-19 RX ADMIN — GABAPENTIN 400 MG: 400 CAPSULE ORAL at 22:14

## 2022-12-19 RX ADMIN — LIDOCAINE HYDROCHLORIDE 100 MG: 10 INJECTION, SOLUTION EPIDURAL; INFILTRATION; INTRACAUDAL; PERINEURAL at 11:48

## 2022-12-19 RX ADMIN — METHOCARBAMOL 1000 MG: 500 TABLET ORAL at 17:42

## 2022-12-19 RX ADMIN — ATORVASTATIN CALCIUM 40 MG: 40 TABLET, FILM COATED ORAL at 17:42

## 2022-12-19 RX ADMIN — ONDANSETRON 4 MG: 2 INJECTION INTRAMUSCULAR; INTRAVENOUS at 11:54

## 2022-12-19 RX ADMIN — BUPROPION HYDROCHLORIDE 150 MG: 150 TABLET, FILM COATED, EXTENDED RELEASE ORAL at 08:50

## 2022-12-19 RX ADMIN — PANTOPRAZOLE SODIUM 40 MG: 40 TABLET, DELAYED RELEASE ORAL at 06:00

## 2022-12-19 RX ADMIN — METOPROLOL SUCCINATE 25 MG: 25 TABLET, EXTENDED RELEASE ORAL at 08:50

## 2022-12-19 RX ADMIN — MIDAZOLAM 1 MG: 1 INJECTION INTRAMUSCULAR; INTRAVENOUS at 11:35

## 2022-12-19 RX ADMIN — COLLAGENASE SANTYL: 250 OINTMENT TOPICAL at 08:50

## 2022-12-19 RX ADMIN — SENNOSIDES 8.6 MG: 8.6 TABLET, FILM COATED ORAL at 08:50

## 2022-12-19 RX ADMIN — QUETIAPINE FUMARATE 25 MG: 25 TABLET ORAL at 22:14

## 2022-12-19 RX ADMIN — OXYCODONE HYDROCHLORIDE 10 MG: 10 TABLET, FILM COATED, EXTENDED RELEASE ORAL at 08:49

## 2022-12-19 RX ADMIN — SENNOSIDES 8.6 MG: 8.6 TABLET, FILM COATED ORAL at 17:42

## 2022-12-19 NOTE — PLAN OF CARE
Problem: OCCUPATIONAL THERAPY ADULT  Goal: Performs self-care activities at highest level of function for planned discharge setting  See evaluation for individualized goals  Description: Treatment Interventions: ADL retraining, Functional transfer training, UE strengthening/ROM, Endurance training, Cognitive reorientation, Patient/family training, Equipment evaluation/education, Compensatory technique education, Energy conservation, Activityengagement          See flowsheet documentation for full assessment, interventions and recommendations  Outcome: Progressing  Note: Limitation: Decreased ADL status, Decreased UE strength, Decreased endurance, Decreased high-level ADLs (New L visual field deficits)  Prognosis: Good  Assessment: Pt's OT goals as per POC  22  Pt seen for OT tx session and assessment of current functional status  Pt currently rolls better to L side requiring Min x 1 but does not tolerate rolling to R side and requires Max x 1  Performed bed mobility w/ Max x 2  Sat EOB x 30 mins w/ Max x 2; zero balance  Increased fatigue  Engaged in trunk flexion and lateral flexion to improve balance and strengthening  Pt washed face in supine and attempted to assist w/ nasreen care  Some encouragement needed to engage in therapy to her fullest potential  Hindered by anxiety and fear of falling  B/L LE's are painful w/ all movements  Good effort given during session  Improvement noted in all areas compared to prior sessions  Pt to OR for another procedure  Remains below baseline and continue to recommend rehab to decrease caregiver burden and achieve optimal performance  Pt did not meet goals as per POC  Therefore, continue OT 3-5x/wk until 23 with goals listed below       OT Discharge Recommendation: Post acute rehabilitation services

## 2022-12-19 NOTE — OCCUPATIONAL THERAPY NOTE
Occupational Therapy Progress Note     Patient Name: Tawanda Dorsey  NQDTN'Y Date: 12/19/2022  Problem List  Principal Problem:    PAD (peripheral artery disease) ([facility] )  Active Problems:    Depression, recurrent ([facility] )    Benign essential hypertension    Stroke ([place] [address] )    Type 2 diabetes mellitus with hyperglycemia, with long-term current use of insulin ([facility])    Hypokalemia    Acute on chronic anemia    Diabetic ulcer of heel associated with diabetes mellitus due to underlying condition (HCC)    HIT (heparin-induced thrombocytopenia)    Febrile    Diarrhea    Mild protein-calorie malnutrition ([facility] )          12/19/22 1100   OT Last Visit   OT Visit Date 12/19/22   Note Type   Note Type Treatment   Pain Assessment   Pain Assessment Tool FLACC   Pain Location/Orientation Orientation: Bilateral;Location: Leg   Pain Onset/Description Onset: Ongoing   Hospital Pain Intervention(s) Repositioned; Ambulation/increased activity; Emotional support; Rest   Pain Rating: FLACC (Rest) - Face 0   Pain Rating: FLACC (Rest) - Legs 0   Pain Rating: FLACC (Rest) - Activity 0   Pain Rating: FLACC (Rest) - Cry 0   Pain Rating: FLACC (Rest) - Consolability 0   Score: FLACC (Rest) 0   Pain Rating: FLACC (Activity) - Face 1   Pain Rating: FLACC (Activity) - Legs 2   Pain Rating: FLACC (Activity) - Activity 1   Pain Rating: FLACC (Activity) - Cry 1   Pain Rating: FLACC (Activity) - Consolability 1   Score: FLACC (Activity) 6   Restrictions/Precautions   Weight Bearing Precautions Per Order Yes   RLE Weight Bearing Per Order NWB  (AKA)   LLE Weight Bearing Per Order NWB  (AKA)   Braces or Orthoses Other (Comment)  (B/L AKA)   Other Precautions Bed Alarm; Fall Risk;Pain   ADL   Grooming Assistance 6  Modified Independent   Grooming Deficit Setup; Wash/dry face   Grooming Comments Seated on EOB while holding self on bedside table w/ Min x 1 + standby of 2nd person, OT combed hair     UB Dressing Assistance 4  Minimal Assistance   UB Dressing Deficit Thread RUE; Thread LUE;Pull around back   UB Dressing Comments Changed hospital gown   Toileting Assistance  2  Maximal Assistance   Toileting Deficit Perineal hygiene; Other (Comment)  (backside)   Toileting Comments Pt cleansed nasreen area the best she could by using bedrail to pull self into semi recumbent position  Max for backside cleaning  Purewick leaked and pad saturated in urine  Powder applied to skin folds  Functional Standing Tolerance   Time 30 mins   Activity Sat on EOB w/ Max x 2 support  Tends to lean to L side  Cues needed to maintain neutral positioning  Engaged in trunk flexion and lateral flexion x 10 times each  Zero seated balance but effort given this session  Bed Mobility   Rolling R 2  Maximal assistance   Additional items Assist x 1;Bedrails; Increased time required;Verbal cues   Additional items Assist x 1;Bedrails; Increased time required;Verbal cues   Supine to Sit 2  Maximal assistance   Additional items Assist x 2;Bedrails; Increased time required;LE management   Sit to Supine 2  Maximal assistance   Additional items Assist x 2; Increased time required;Verbal cues   Additional Comments Pt anxious and fearful of falling  Needs clear explanation and increased time to complete tasks  Difficulties maintaining neutral position  Tends to lean to L side  Transfers   Sit to Stand Unable to assess   Therapeutic Exercise - ROM   UE-ROM   (Pt completed during session while rolling, holding self upright on EOB, and using table for support  Reports fatigue )   Subjective   Subjective "I'm going for more surgery today"   Cognition   Arousal/Participation Alert; Cooperative   Attention Attends with cues to redirect   Orientation Level Oriented to person;Oriented to place;Oriented to time   Memory Decreased short term memory;Decreased recall of precautions   Following Commands Follows one step commands without difficulty   Comments Pt limited by anxiety and fear of falling     Additional Activities Additional Activities Comments Encouraged pt to keep rolling in both directions to weight shift and offload backside  Activity Tolerance   Activity Tolerance Patient limited by fatigue;Patient limited by pain   Medical Staff Made Aware Morgan AHUJA & RN, Clara   Assessment   Assessment Pt's OT goals as per POC  22  Pt seen for OT tx session and assessment of current functional status  Pt currently rolls better to L side requiring Min x 1 but does not tolerate rolling to R side and requires Max x 1  Performed bed mobility w/ Max x 2  Sat EOB x 30 mins w/ Max x 2; zero balance  Increased fatigue  Engaged in trunk flexion and lateral flexion to improve balance and strengthening  Pt washed face in supine and attempted to assist w/ nasreen care  Some encouragement needed to engage in therapy to her fullest potential  Hindered by anxiety and fear of falling  B/L LE's are painful w/ all movements  Good effort given during session  Improvement noted in all areas compared to prior sessions  Pt to OR for another procedure  Remains below baseline and continue to recommend rehab to decrease caregiver burden and achieve optimal performance  Pt did not meet goals as per POC  Therefore, continue OT 3-5x/wk until 23 with goals listed below  Plan   Treatment Interventions ADL retraining;Functional transfer training;UE strengthening/ROM; Endurance training;Patient/family training;Equipment evaluation/education; Energy conservation; Activityengagement   OT Treatment Day 15   OT Frequency 3-5x/wk   Recommendation   OT Discharge Recommendation Post acute rehabilitation services   AM-PAC Daily Activity Inpatient   Lower Body Dressing 1   Bathing 2   Toileting 2   Upper Body Dressing 3   Grooming 3   Eating 3   Daily Activity Raw Score 14   Daily Activity Standardized Score (Calc for Raw Score >=11) 33 39   AM-PAC Applied Cognition Inpatient   Following a Speech/Presentation 4   Understanding Ordinary Conversation 4   Taking Medications 3   Remembering Where Things Are Placed or Put Away 4   Remembering List of 4-5 Errands 3   Taking Care of Complicated Tasks 2   Applied Cognition Raw Score 20   Applied Cognition Standardized Score 41 76     Goals to be achieved by 1/2/23 3-5x/wk    Patient will perform functional bed mobility with Minimal Assistance  Patient will perform UB dressing task with [address] with set up  Patient will perform LB dressing task with Moderate Assistance using compensatory strategies  Patient will perform toileting tasks with Moderate Assistance using compensatory strategies  Patient will increase BUE strength by 1 MM grade in preparation to increase ability to participate in ADL tasks  Patient will improve activity tolerance by participating in 20 minutes of session at a time in preparation for participation in ADL tasks  Patient will attend to 100% of cognitive task during session   OT to assess functional OOB transfers and set appropriate goals      Con TALLEY, OTR/L

## 2022-12-19 NOTE — OP NOTE
OPERATIVE REPORT  PATIENT NAME: Saurav Mckeon    :  1960  MRN: [id number]  Pt Location: [facility]    SURGERY DATE: 2022    Surgeon(s) and Role:     * Johnathan Britton DO - Primary     * Anika Guzman DO - Assisting    Preop Diagnosis:  Amputation above knee ([address] ) [S78 119A]    Post-Op Diagnosis Codes:     * Amputation above knee ([address] ) [S78 119A]    Procedure(s) (LRB):  Right above knee amputation washout; vac change; Left above knee amputation debridement; vac placement (Bilateral)    Specimen(s):  ID Type Source Tests Collected by Time Destination   A : LEFT STUMP WOUND Tissue Leg, Left ANAEROBIC CULTURE AND GRAM STAIN, CULTURE, TISSUE AND GRAM STAIN Johnathan Britton DO 2022 1214        Estimated Blood Loss:   Minimal    Drains:  External Urinary Catheter (Active)   Collection Container Canister and suction tubing (For Female) 22 0647   Suction Pressure (mmHg) 100 mmHg 22 0647   Interventions Removed and skin assessed; Pericare performed;Device changed;Suction canister changed;Suction tubing changed 22 0647   Output (mL) 100 mL 22 0601   Number of days: 0       Anesthesia Type:   Choice    Operative Indications:  Amputation above knee ([address] ) [S78 119A]    Operative Findings:    Right stump Lateral Wound 7 x 4 x1, Right stump inferior wound 17 x 4 x 2  Right stump healthy granulation tissue, healthy bleeding  Right stump excoriations healing     Left stump open wound with purulent drainage with abscess cavity approx 2 x  3 x  5  Packed with Iodoform packing  Complications:   None    Procedure and Technique:    The patient was brought to the operating room and placed on the operating room table in a supine fashion  General endotracheal anesthesia was induced and preoperative antibiotics were administered  Bilateral stumps were prepped and draped in the standard sterile fashion  A time out was performed       The right stump wound vac was removed and the stump was prepped with betadine  There was healthy granulation tissue and adequate bleeding  Two black sponges were placed and cut according to size  The right lateral wound measured 7 x 4 x 1  The right inferior wound measured 17 x 4 x 2  The adhesive dressing was applied and the sponges bridged  Then the wound vac was turned on and suction applied with good seal and no leak  Next, on the left stump every other staple was removed  There was an abscess cavity with purulent drainage  This was irrigated  The open wound measured 2 x  3 x 5  The cavity was packed with 1'' iodoform packing and secured the tail to the skin with adhesive  A mepiplex dressing was placed over the wound  The patient was extubated without issues and transferred to PACU in stable condition  Dr Yoshi Tam was scrubbed, present and participated in the entire case         I was present for the entire procedure    Patient Disposition:  PACU         SIGNATURE: Seble Restrepo DO  DATE: December 19, 2022  TIME: 12:37 PM

## 2022-12-19 NOTE — UTILIZATION REVIEW
Continued Stay Review    Date:  12/16 and 12/19/22         Current Patient Class: IP  Current Level of Care: MS    HPI:62 y o  female initially admitted on 10/21    Assessment/Plan:   12/16 pt went to the [address] [place] [address] R stump washout, debridment thigh wounds and application wound VAC  She had continued analgesia, she is A&O  Will plan for wound VAC change on Monday 12/19     ++++++++++++++++++++++++++  12/16 OPERATIVE NOTE  Preop Diagnosis:  PAD (peripheral artery disease) ([facility] ) [I73 9]     Post-Op Diagnosis Codes:     * PAD (peripheral artery disease) ([facility]) [I73 9]     Procedure(s) (LRB):     Right AKA stump washout, debridement, application of wound vac  Debridement right lateral thigh wound and posterior thigh wound    Anesthesia Type: General w/ Femoral Block     Operative Findings:  Nonhealing, unhealthy skin edges of right AKA stump, staples were removed wound was irrigated and vac was placed  Right lateral and posterior thigh skin wounds with fibrinous tissue and sloughing were sharply debrided and included in part of vac  No evidence of infection    ++++++++++++++++++++++++++    Date: 12/17:   Wound VAC with good seal   Plan for return to OR on 12/19 for washout and VAC change  Continues on Coumadin, Plavix, statin, analgesia  Off oxygen  Continued analgesia  Date: 12/18:   Reports good pain control and reports minimal pain and only with certain movements  L AKA dressing changed today and no pus expressed from wound        Date: 12/19:  Planned return to the OR today       ++++++++++++++++++++++++++  12/19 OPERATIVE NOTE:   Preop Diagnosis:  Amputation above knee ([facility] ) [S78 119A]     Post-Op Diagnosis Codes:     * Amputation above knee ([facility] ) [S78 119A]     Procedure(s) (LRB):  Right above knee amputation washout; vac change; Left above knee amputation debridement; vac placement (Bilateral)    Anesthesia: General     Operative Findings:     Right stump Lateral Wound 7 x 4 x1, Right stump inferior wound 17 x 4 x 2  Right stump healthy granulation tissue, healthy bleeding  Right stump excoriations healing      Left stump open wound with purulent drainage with abscess cavity approx 2 x  3 x  5   Packed with Iodoform packing    ++++++++++++++++++++++++++    Vital Signs:   12/19/22 1329 99 1 °F (37 3 °C) 88 20 138/89 110 96 % 32 -- 3 L/min Nasal cannula WDL --   12/19/22 1314 -- 84 12 147/68 98 97 % 32 -- 3 L/min Nasal cannula WDL --   12/19/22 1301 -- 84 22 140/65 93 99 % 32 -- 3 L/min Nasal cannula WDL --   12/19/22 1247 99 9 °F (37 7 °C) 82 23 Abnormal  177/70 Abnormal  100 100 % -- 10 L/min -- Simple mask WDL --   12/19/22 0801 98 °F (36 7 °C) 86 20 134/81 -- 96 % -- -- -- -- -- Lying   12/18/22 2311 98 2 °F (36 8 °C) 85 20 140/80 110 96 % -- -- -- None (Room air) -- Lying   12/18/22 2139 -- -- -- 158/81 -- -- -- -- -- -- -- --   12/18/22 1933 -- -- -- -- -- -- -- -- -- None (Room air) -- --   12/18/22 1448 98 7 °F (37 1 °C) 86 20 198/84 Abnormal  120 96 % -- -- -- None (Room air) -- Lying   12/18/22 0719 97 3 °F (36 3 °C) Abnormal  83 18 140/72 101 95 % -- -- -- None (Room air) -- Lying   12/17/22 2254 97 4 °F (36 3 °C) Abnormal  86 18 144/62 89 94 % -- -- -- None (Room air) -- Lying   12/17/22 2109 -- -- -- 142/78 -- -- -- -- -- -- -- Lying   12/17/22 2000 -- -- -- -- -- -- -- -- -- None (Room air) -- --   12/17/22 1500 98 °F (36 7 °C) 87 18 161/81 -- 98 % -- -- -- None (Room air) -- Lying   12/17/22 0802 98 4 °F (36 9 °C) 88 18 154/67 -- 92 % -- -- -- -- -- Lying       Row Name 12/17/22 0822 12/17/22 0802 12/17/22 0600 12/16/22 2249 12/16/22 2144   Vital Signs   Temp -- 98 4 °F (36 9 °C)  -YC -- 99 8 °F (37 7 °C)  -DS --   Temp src -- Temporal  -YC -- Temporal  -DS --   Pulse -- 88  -YC -- 98  -DS 94  -CR   Heart Rate Source -- -- -- -- Monitor  -CR   Resp -- 18  -YC -- 18  -DS --   BP -- 154/67  -YC -- 169/70  -/80 Abnormal   -CR   MAP (mmHg) -- -- -- 101  -DS 97  -CR   BP Location -- Left arm  -YC -- Left arm  -DS Left arm  -CR   BP Method -- Automatic  -YC -- Automatic  -DS Automatic  -CR   Patient Position - Orthostatic VS -- Lying  -YC -- Lying  -DS Lying  -CR   Pain Assessment   Pain Assessment Tool 0-10  -KL -- -- -- --   Pain Score 5  -KL -- -- -- --   Oxygen Therapy   SpO2 -- 92 %  -YC -- 90 %  -DS --   SpO2 Activity -- -- -- At Rest  -DS --   O2 Device -- -- -- None (Room air)  -DS --   Height and Weight       Row Name 12/16/22 1930 12/16/22 1648 12/16/22 1550 12/16/22 1548 12/16/22 1333   Vital Signs   Temp -- -- 98 °F (36 7 °C)  -YC -- --   Temp src -- -- Temporal  -YC -- --   Pulse -- -- 88  -YC -- 80  -YA   BP -- -- 149/66  -YC -- 144/65  -YA   MAP (mmHg) -- -- -- -- 94  -YA   BP Location -- -- Left arm  -YC -- --   BP Method -- -- Automatic  -YC -- --   Patient Position - Orthostatic VS -- -- Lying  -YC -- --   Pain Assessment   Pain Assessment Tool 0-10  -CR -- -- -- --   Pain Score No Pain  -CR -- -- 8  -PD --   Pain Location/Orientation -- -- -- Orientation: Bilateral;Location: Leg  -PD --   Pain Assessment Post Intervention   Reason Not Indicated -- Sleeping / Easy to arouse  -PD -- -- --   Oxygen Therapy   SpO2 -- -- 99 %  -YC -- 98 %  -YA   O2 Device Nasal cannula  -CR -- -- -- --   Nasal Cannula O2 Flow Rate (L/min) 2 L/min  -CR -- -- -- --       Row Name 12/16/22 1251 12/16/22 1223 12/16/22 1106   Temp -- 97 8 °F (36 6 °C)  -YA --   Pulse 84  -YA 87  -YA --   Resp 21  -YA 20  -YA --   /80 Abnormal   -/77 Abnormal   -YA --   MAP (mmHg) 115  -YA -- --   Pain Assessment Tool -- FLACC  -YA --   Pain Score -- No Pain  -YA No Pain  pain is intermittent not continuous  -LB   Pain Location/Orientation -- -- --   Pain Rating: FLACC (Rest) - Face -- 0  -YA --   Pain Rating: FLACC (Rest) - Legs -- 0  -YA --   Pain Rating: FLACC (Rest) - Activity -- 0  -YA --   Pain Rating: FLACC (Rest) - Cry -- 0  -YA --   Pain Rating: FLACC (Rest) - Consolability -- 0  -YA --   Score: FLACC (Rest) -- 0  -YA -- SpO2 99 %  -YA 98 %  -YA --   SpO2 Activity At Rest  -YA At Rest  -YA --   O2 Device Nasal cannula  -YA Nasal cannula  -YA --   Nasal Cannula O2 Flow Rate (L/min) 4 L/min  -YA 4 L/min  -YA --       Row Name 12/16/22 0841 12/16/22 0739 12/16/22 0130 12/15/22 2350   Vital Signs   Temp -- 97 9 °F (36 6 °C)  -RI 97 8 °F (36 6 °C)  -PC --   Temp src -- Temporal  -RI -- --   Pulse -- 90  -RI 90  -PC --   Resp -- 18  -RI 18  -PC --   BP -- 155/84  -/106 Abnormal   -/70 Abnormal   -PC   MAP (mmHg) -- 110  -RI -- --   BP Location -- Left arm  -RI -- --   BP Method -- Automatic  -RI -- --   Patient Position - Orthostatic VS -- Lying  -RI -- --   Pain Assessment   Pain Assessment Tool 0-10  -JU -- -- --   Pain Score 8  post session  -JU -- -- --   Pain Location/Orientation Orientation: Right;Location: Leg  -JU -- -- --   Oxygen Therapy   SpO2 -- 92 %  -RI 98 %  -PC --   SpO2 Activity -- At Rest  -RI -- --   O2 Device -- None (Room air)  -RI None (Room air)  -PC --     Pertinent Labs/Diagnostic Results:    Results from last 7 days   Lab Units 12/19/22  0448 12/18/22  1015 12/17/22  0430 12/16/22  0557 12/15/22  0755   WBC Thousand/uL 6 95 7 28 11 18* 9 14 8 91   HEMOGLOBIN g/dL 7 5* 7 8* 7 7* 8 1* 6 9*   HEMATOCRIT % 25 5* 25 4* 25 0* 26 5* 22 9*   PLATELETS Thousands/uL 262 249 275 300 285                 Results from last 7 days   Lab Units 12/19/22  1252 12/19/22  0800 12/18/22  2052 12/18/22  1617 12/18/22  1119 12/18/22  0720 12/17/22  2102 12/17/22  1604 12/17/22  1155 12/17/22  0804 12/16/22  2117 12/16/22  1549   POC GLUCOSE mg/dl 126 128 147* 165* 194* 145* 175* 188* 142* 152* 116 175*         Results from last 7 days   Lab Units 12/19/22  0448 12/18/22  1015 12/17/22  0430   PROTIME seconds 24 2* 25 1* 21 5*   INR  2 19* 2 29* 1 88*     Medications:   Scheduled Medications:  amLODIPine, 10 mg, Oral, Daily  atorvastatin, 40 mg, Oral, Daily With Dinner  buPROPion, 150 mg, Oral, Daily  cefazolin, 2,000 mg, Intravenous, On Call To OR  clopidogrel, 75 mg, Oral, Daily  collagenase, , Topical, Daily  doxazosin, 4 mg, Oral, HS  gabapentin, 400 mg, Oral, TID  insulin glargine, 10 Units, Subcutaneous, HS  insulin lispro, 1-6 Units, Subcutaneous, TID AC  lisinopril, 40 mg, Oral, Daily  methocarbamol, 1,000 mg, Oral, Q8H MICHELLE  metoprolol succinate, 25 mg, Oral, Daily  nystatin, , Topical, BID  oxyCODONE, 10 mg, Oral, Q12H MICHELLE  pantoprazole, 40 mg, Oral, Early Morning  polyethylene glycol, 17 g, Oral, BID  QUEtiapine, 25 mg, Oral, HS  senna, 1 tablet, Oral, BID  sertraline, 175 mg, Oral, Daily  warfarin, 4 mg, Oral, Daily (warfarin)      Continuous IV Infusions:  sodium chloride, 100 mL/hr, Intravenous, Continuous - restarted 12/19      PRN Meds:  acetaminophen, 650 mg, Oral, Q6H PRN  ALPRAZolam, 0 25 mg, Oral, Daily PRN  aluminum-magnesium hydroxide-simethicone, 30 mL, Oral, Q4H PRN  labetalol, 10 mg, Intravenous, Q6H PRN  metoclopramide, 10 mg, Intravenous, Q6H PRN  naloxone, 0 04 mg, Intravenous, Q1MIN PRN  ondansetron, 4 mg, Intravenous, Q6H PRN  oxyCODONE, 2 5 mg, Oral, Q4H PRN   Or  oxyCODONE, 5 mg, Oral, Q4H PRN   Or  oxyCODONE, 7 5 mg, Oral, Q4H PRN - 12/16    Discharge Plan: rehab     Network Utilization Review Department  ATTENTION: Please call with any questions or concerns to [phone] and carefully listen to the prompts so that you are directed to the right person  All voicemails are confidential   Gretchen Finder all requests for admission clinical reviews, approved or denied determinations and any other requests to dedicated fax number below belonging to the campus where the patient is receiving treatment   List of dedicated fax numbers for the Facilities:  [address] DENIALS (Administrative/Medical Necessity) [phone]   [address] (Maternity/NICU/Pediatrics) [phone]   [facility] Palmira Tang [phone]   [facility] [phone] [address] [place] [address] [place] [address] [facility] [address] [facility] 310 [facility] [place] [address] [phone]

## 2022-12-19 NOTE — PHYSICAL THERAPY NOTE
Physical Therapy Treatment Note     12/19/22 1059   PT Last Visit   PT Visit Date 12/19/22   Note Type   Note Type Treatment   Pain Assessment   Pain Assessment Tool FLACC   Pain Location/Orientation Orientation: Bilateral;Location: Leg   Restrictions/Precautions   Weight Bearing Precautions Per Order Yes   RLE Weight Bearing Per Order (S)  NWB  (AKA)   LLE Weight Bearing Per Order (S)  NWB  (AKA)   Braces or Orthoses   (B/L AKA)   Other Precautions Pain; Fall Risk;Bed Alarm;Cognitive   General   Chart Reviewed Yes   Family/Caregiver Present No   Subjective   Subjective pt  in bed upon netry  Agreable to PT   Bed Mobility   Rolling R 2  Maximal assistance   Additional items Assist x 1;Bedrails; Increased time required;LE management;Verbal cues   Rolling L 4  Minimal assistance   Additional items Assist x 1; Increased time required;LE management;Verbal cues; Bedrails   Supine to Sit 2  Maximal assistance   Additional items Assist x 2; Increased time required;LE management;Verbal cues;HOB elevated; Bedrails   Sit to Supine 2  Maximal assistance   Additional items Assist x 2   Balance   Static Sitting Zero   Dynamic Sitting Poor -   Endurance Deficit   Endurance Deficit Yes   Endurance Deficit Description Fear, pain   Activity Tolerance   Activity Tolerance Patient tolerated treatment well   Nurse Made Aware Yes   Exercises   THR Supine;Bilateral;AROM;AAROM;10 reps   Balance training  Sitting balance activities while seated at EOB with Max A 1-2   Assessment   Prognosis Guarded   Problem List Decreased strength;Decreased range of motion;Decreased endurance; Impaired balance;Decreased mobility; Decreased cognition; Impaired judgement;Pain;Obesity; Decreased skin integrity   Assessment pt  noted with improved participation this session  Pt  continues to require increased assistance for rolling to the R  Pt  requires max A x 2 for supine to sit nad back transfers  Pt  needed total A for pericare   Pt  performed activities to engage core m  and to improve sitting balance -pulling forward to upright posture, lateral lean to upright posture with max A 1-2  pt  able to maintian sitting balance with table support in front of her  pt  noted with excessive posterior and R lateral push when sitting  Cues given to correct the same  pt  positioned back in supine with all needs within reach and bed alarm engaged  pt  able to maintain sitting at EOb with max A 1-2 and CGA/CS with table assistance for ~30 min  Barriers to Discharge None   Goals   Patient Goals none reported   STG Expiration Date 12/23/22   PT Treatment Day 14   Plan   Treatment/Interventions Functional transfer training;LE strengthening/ROM; Therapeutic exercise; Bed mobility; Equipment eval/education;Patient/family training;Cognitive reorientation;OT;Spoke to nursing   Progress Slow progress, decreased activity tolerance   PT Frequency 3-5x/wk   Recommendation   PT Discharge Recommendation Post acute rehabilitation services   AM-PAC Basic Mobility Inpatient   Turning in Bed Without Bedrails 2   Lying on Back to Sitting on Edge of Flat Bed 2   Moving Bed to Chair 1   Standing Up From Chair 1   Walk in Room 1   Climb 3-5 Stairs 1   Basic Mobility Inpatient Raw Score 8   Turning Head Towards Sound 4   Follow Simple Instructions 4   Low Function Basic Mobility Raw Score 16   Low Function Basic Mobility Standardized Score 25 72   Highest Level Of Mobility   JH-HLM Goal 3: Sit at edge of bed   JH-HLM Achieved 3: Sit at edge of bed   End of Consult   Patient Position at End of Consult Supine; All needs within reach;Bed/Chair alarm activated           Morgan Hamilton PTA    An AM-PAC basic mobility standardized score less than 42 9 suggest the patient may benefit from discharge to post-acute rehab services

## 2022-12-19 NOTE — ASSESSMENT & PLAN NOTE
Replete  No results for input(s): K, MG in the last 72 hours    Repleted  Monitor potassium with addition of ACE inhibitor

## 2022-12-19 NOTE — PLAN OF CARE
Problem: Potential for Falls  Goal: Patient will remain free of falls  Description: INTERVENTIONS:  - Educate patient/family on patient safety including physical limitations  - Instruct patient to call for assistance with activity   - Consult OT/PT to assist with strengthening/mobility   - Keep Call bell within reach  - Keep bed low and locked with side rails adjusted as appropriate  - Keep care items and personal belongings within reach  - Initiate and maintain comfort rounds  - Make Fall Risk Sign visible to staff  - Offer Toileting every 2 Hours, in advance of need  - Initiate/Maintain bed  chair alarm  - Obtain necessary fall risk management equipment: bed chair alarm, call bell, gripper sock, walker, bedside commode  - Apply yellow socks and bracelet for high fall risk patients  - Consider moving patient to room near nurses station  Outcome: Progressing     Problem: PAIN - ADULT  Goal: Verbalizes/displays adequate comfort level or baseline comfort level  Description: Interventions:  - Encourage patient to monitor pain and request assistance  - Assess pain using appropriate pain scale  - Administer analgesics based on type and severity of pain and evaluate response  - Implement non-pharmacological measures as appropriate and evaluate response  - Consider cultural and social influences on pain and pain management  - Notify physician/advanced practitioner if interventions unsuccessful or patient reports new pain  Outcome: Progressing     Problem: INFECTION - ADULT  Goal: Absence or prevention of progression during hospitalization  Description: INTERVENTIONS:  - Assess and monitor for signs and symptoms of infection  - Monitor lab/diagnostic results  - Monitor all insertion sites, i e  indwelling lines, tubes, and drains  - Administer medications as ordered  - Instruct and encourage patient and family to use good hand hygiene technique  Outcome: Progressing     Problem: SAFETY ADULT  Goal: Patient will remain free of falls  Description: INTERVENTIONS:  - Educate patient/family on patient safety including physical limitations  - Instruct patient to call for assistance with activity   - Consult OT/PT to assist with strengthening/mobility   - Keep Call bell within reach  - Keep bed low and locked with side rails adjusted as appropriate  - Keep care items and personal belongings within reach  - Initiate and maintain comfort rounds  - Make Fall Risk Sign visible to staff  - Offer Toileting every 2 Hours, in advance of need  - Initiate/Maintain bed  chair alarm  - Obtain necessary fall risk management equipment: bed chair alarm, call bell, gripper sock, walker, bedside commode  - Apply yellow socks and bracelet for high fall risk patients  - Consider moving patient to room near nurses station  Outcome: Progressing     Problem: DISCHARGE PLANNING  Goal: Discharge to home or other facility with appropriate resources  Description: INTERVENTIONS:  - Identify barriers to discharge w/patient   - Arrange for needed discharge resources and transportation as appropriate  - Identify discharge learning needs  - Refer to Case Management Department for coordinating discharge planning if the patient needs post-hospital services based on physician/advanced practitioner order   Outcome: Progressing     Problem: Knowledge Deficit  Goal: Patient/family/caregiver demonstrates understanding of disease process, treatment plan, medications, and discharge instructions  Description: Complete learning assessment and assess knowledge base    Interventions:  - Provide teaching at level of understanding  - Provide teaching via preferred learning methods  Outcome: Progressing     Problem: METABOLIC, FLUID AND ELECTROLYTES - ADULT  Goal: Electrolytes maintained within normal limits  Description: INTERVENTIONS:  - Monitor labs and assess patient for signs and symptoms of electrolyte imbalances  - Administer electrolyte replacement as ordered  - Monitor response to electrolyte replacements, including repeat lab results as appropriate  - Instruct patient on fluid and nutrition as appropriate  Outcome: Progressing  Goal: Fluid balance maintained  Description: INTERVENTIONS:  - Monitor labs   - Monitor I/O and WT  - Instruct patient on fluid and nutrition as appropriate  - Assess for signs & symptoms of volume excess or deficit  Outcome: Progressing  Goal: Glucose maintained within target range  Description: INTERVENTIONS:  - Monitor Blood Glucose as ordered  - Assess for signs and symptoms of hyperglycemia and hypoglycemia  - Administer ordered medications to maintain glucose within target range  - Assess nutritional intake and initiate nutrition service referral as needed  Outcome: Progressing     Problem: SKIN/TISSUE INTEGRITY - ADULT  Goal: Skin Integrity remains intact(Skin Breakdown Prevention)  Description: Assess:  -Perform Nicanor assessment every shift  -Clean and moisturize skin every shift  -Inspect skin when repositioning, toileting, and assisting with ADLS  -Assess under medical devices   -Assess extremities for adequate circulation and sensation     Bed Management:  -Have minimal linens on bed & keep smooth, unwrinkled  -Change linens as needed when moist or perspiring  -Avoid sitting or lying in one position for more than 2 hours while in bed    Toileting:  -Offer bedside commode  -Assess for incontinence every 2 hours  -Use incontinent care products after each incontinent episode     Activity:  -Mobilize patient 3 times a day  -Encourage or provide ROM exercises   -Turn and reposition patient every 2 Hours  -Use appropriate equipment to lift or move patient in bed  -Instruct/ Assist with weight shifting every 15 minutes when out of bed in chair  -Consider limitation of chair time 1 hour intervals    Skin Care:  -Avoid use of baby powder, tape, friction and shearing, hot water or constrictive clothing  -Relieve pressure over bony prominences using waffle cushion when in chair  -Do not massage red bony areas    Outcome: Progressing  Goal: Incision(s), wounds(s) or drain site(s) healing without S/S of infection  Description: INTERVENTIONS  - Assess and document dressing, incision, wound bed, drain sites and surrounding tissue  - Provide patient and family education  - Perform skin care/dressing changes everyday as ordered  Outcome: Progressing  Goal: Pressure injury heals and does not worsen  Description: Interventions:  - Implement low air loss mattress or specialty surface (Criteria met)  - Apply silicone foam dressing  - Instruct/assist with weight shifting every 15 minutes when in chair   - Limit chair time to 1 hour intervals  - Use special pressure reducing interventions such as waffle cushion when in chair   - Perform passive or active ROM   - Turn and reposition patient & offload bony prominences every 2 hours   - Utilize friction reducing device or surface for transfers   - Consider consults to  interdisciplinary teams such as wound care, PT/OT  - Use incontinent care products after each incontinent episode   - Consider nutrition services referral as needed  Outcome: Progressing     Problem: MUSCULOSKELETAL - ADULT  Goal: Maintain or return mobility to safest level of function  Description: INTERVENTIONS:  - Assess patient's ability to carry out ADLs; assess patient's baseline for ADL function and identify physical deficits which impact ability to perform ADLs (bathing, care of mouth/teeth, toileting, grooming, dressing, etc )  - Assess/evaluate cause of self-care deficits   - Assess range of motion  - Assess patient's mobility  - Assess patient's need for assistive devices and provide as appropriate  - Encourage maximum independence but intervene and supervise when necessary  - Involve family in performance of ADLs  - Assess for home care needs following discharge   - Consider OT consult to assist with ADL evaluation and planning for discharge  - Provide patient education as appropriate  Outcome: Progressing  Goal: Maintain proper alignment of affected body part  Description: INTERVENTIONS:  - Support, maintain and protect limb and body alignment  - Provide patient/ family with appropriate education  Outcome: Progressing     Problem: CARDIOVASCULAR - ADULT  Goal: Maintains optimal cardiac output and hemodynamic stability  Description: INTERVENTIONS:  - Monitor I/O, vital signs and rhythm  - Monitor for S/S and trends of decreased cardiac output  - Administer and titrate ordered vasoactive medications to optimize hemodynamic stability  - Assess quality of pulses, skin color and temperature  - Assess for signs of decreased coronary artery perfusion  - Instruct patient to report change in severity of symptoms  Outcome: Progressing     Problem: RESPIRATORY - ADULT  Goal: Achieves optimal ventilation and oxygenation  Description: INTERVENTIONS:  - Assess for changes in respiratory status  - Assess for changes in mentation and behavior  - Position to facilitate oxygenation and minimize respiratory effort  - Oxygen administered by appropriate delivery if ordered  - Initiate smoking cessation education as indicated  - Encourage broncho-pulmonary hygiene including cough, deep breathe, Incentive Spirometry  - Assess the need for suctioning and aspirate as needed  - Assess and instruct to report SOB or any respiratory difficulty  - Respiratory Therapy support as indicated  Outcome: Progressing     Problem: Prexisting or High Potential for Compromised Skin Integrity  Goal: Skin integrity is maintained or improved  Description: INTERVENTIONS:  - Identify patients at risk for skin breakdown  - Assess and monitor skin integrity  - Assess and monitor nutrition and hydration status  - Monitor labs   - Assess for incontinence   - Turn and reposition patient  - Assist with mobility/ambulation  - Relieve pressure over bony prominences  - Avoid friction and shearing  - Provide appropriate hygiene as needed including keeping skin clean and dry  - Evaluate need for skin moisturizer/barrier cream  - Collaborate with interdisciplinary team   - Patient/family teaching  - Consider wound care consult   Outcome: Progressing     Problem: Nutrition/Hydration-ADULT  Goal: Nutrient/Hydration intake appropriate for improving, restoring or maintaining nutritional needs  Description: Monitor and assess patient's nutrition/hydration status for malnutrition  Collaborate with interdisciplinary team and initiate plan and interventions as ordered  Monitor patient's weight and dietary intake as ordered or per policy  Utilize nutrition screening tool and intervene as necessary  Determine patient's food preferences and provide high-protein, high-caloric foods as appropriate       INTERVENTIONS:  - Monitor oral intake, urinary output, labs, and treatment plans  - Assess nutrition and hydration status and recommend course of action  - Evaluate amount of meals eaten  - Assist patient with eating if necessary   - Allow adequate time for meals  - Recommend/ encourage appropriate diets, oral nutritional supplements, and vitamin/mineral supplements  - Order, calculate, and assess calorie counts as needed  - Recommend, monitor, and adjust tube feedings and TPN/PPN based on assessed needs  - Assess need for intravenous fluids  - Provide specific nutrition/hydration education as appropriate  - Include patient/family/caregiver in decisions related to nutrition  Outcome: Progressing     Problem: GENITOURINARY - ADULT  Goal: Maintains or returns to baseline urinary function  Description: INTERVENTIONS:  - Assess urinary function  - Encourage oral fluids to ensure adequate hydration if ordered  - Administer IV fluids as ordered to ensure adequate hydration  - Administer ordered medications as needed  - Offer frequent toileting  - Follow urinary retention protocol if ordered  Outcome: Progressing  Goal: Absence of urinary retention  Description: INTERVENTIONS:  - Assess patient’s ability to void and empty bladder  - Monitor I/O  - Bladder scan as needed  - Discuss with physician/AP medications to alleviate retention as needed  - Discuss catheterization for long term situations as appropriate  Outcome: Progressing     Problem: HEMATOLOGIC - ADULT  Goal: Maintains hematologic stability  Description: INTERVENTIONS  - Assess for signs and symptoms of bleeding or hemorrhage  - Monitor labs  - Administer supportive blood products/factors as ordered and appropriate  Outcome: Progressing     Problem: MOBILITY - ADULT  Goal: Maintain or return to baseline ADL function  Description: INTERVENTIONS:  -  Assess patient's ability to carry out ADLs; assess patient's baseline for ADL function and identify physical deficits which impact ability to perform ADLs (bathing, care of mouth/teeth, toileting, grooming, dressing, etc )  - Assess/evaluate cause of self-care deficits   - Assess range of motion  - Assess patient's mobility; develop plan if impaired  - Assess patient's need for assistive devices and provide as appropriate  - Encourage maximum independence but intervene and supervise when necessary  - Involve family in performance of ADLs  - Assess for home care needs following discharge   - Consider OT consult to assist with ADL evaluation and planning for discharge  - Provide patient education as appropriate  Outcome: Not Progressing  Goal: Maintains/Returns to pre admission functional level  Description: INTERVENTIONS:  - Perform BMAT or MOVE assessment daily    - Set and communicate daily mobility goal to care team and patient/family/caregiver  - Collaborate with rehabilitation services on mobility goals if consulted  - Assist patient in repositioning every 2 hours    - Dangle patient 3 times a day  - Stand patient 3 times a day  - Out of bed to chair as tolerated  - Out of bed for meals  - Out of bed for toileting  - Record patient progress and toleration of activity level   Outcome: Not Progressing     Problem: SAFETY ADULT  Goal: Maintain or return to baseline ADL function  Description: INTERVENTIONS:  -  Assess patient's ability to carry out ADLs; assess patient's baseline for ADL function and identify physical deficits which impact ability to perform ADLs (bathing, care of mouth/teeth, toileting, grooming, dressing, etc )  - Assess/evaluate cause of self-care deficits   - Assess range of motion  - Assess patient's mobility; develop plan if impaired  - Assess patient's need for assistive devices and provide as appropriate  - Encourage maximum independence but intervene and supervise when necessary  - Involve family in performance of ADLs  - Assess for home care needs following discharge   - Consider OT consult to assist with ADL evaluation and planning for discharge  - Provide patient education as appropriate  Outcome: Not Progressing  Goal: Maintains/Returns to pre admission functional level  Description: INTERVENTIONS:  - Perform BMAT or MOVE assessment daily    - Set and communicate daily mobility goal to care team and patient/family/caregiver  - Collaborate with rehabilitation services on mobility goals if consulted  - Assist patient in repositioning every 2 hours    - Dangle patient 3 times a day  - Stand patient 3 times a day  - Out of bed to chair as tolerated  - Out of bed for meals  - Out of bed for toileting  - Record patient progress and toleration of activity level   Outcome: Not Progressing

## 2022-12-19 NOTE — ASSESSMENT & PLAN NOTE
Evaluated by Psychiatry x 2 on this admission  ·  continue Zoloft 175 mg daily, Wellbutrin 150 mg daily, Seroquel 25 mg at bedtime

## 2022-12-19 NOTE — PLAN OF CARE
Problem: PHYSICAL THERAPY ADULT  Goal: Performs mobility at highest level of function for planned discharge setting  See evaluation for individualized goals  Description: Treatment/Interventions: Functional transfer training, LE strengthening/ROM, Therapeutic exercise, Endurance training, Patient/family training, Equipment eval/education, Bed mobility, Compensatory technique education, Gait training, Continued evaluation, Spoke to nursing, Spoke to MD, Spoke to case management, OT          See flowsheet documentation for full assessment, interventions and recommendations  Outcome: Progressing  Note: Prognosis: Guarded  Problem List: Decreased strength, Decreased range of motion, Decreased endurance, Impaired balance, Decreased mobility, Decreased cognition, Impaired judgement, Pain, Obesity, Decreased skin integrity  Assessment: pt  noted with improved participation this session  Pt  continues to require increased assistance for rolling to the R  Pt  requires max A x 2 for supine to sit nad back transfers  Pt  needed total A for pericare  Pt  performed activities to engage core m  and to improve sitting balance -pulling forward to upright posture, lateral lean to upright posture with max A 1-2  pt  able to maintian sitting balance with table support in front of her  pt  noted with excessive posterior and R lateral push when sitting  Cues given to correct the same  pt  positioned back in supine with all needs within reach and bed alarm engaged  pt  able to maintain sitting at EOb with max A 1-2 and CGA/CS with table assistance for ~30 min  Barriers to Discharge: None  Barriers to Discharge Comments: alone  PT Discharge Recommendation: Post acute rehabilitation services    See flowsheet documentation for full assessment

## 2022-12-19 NOTE — ASSESSMENT & PLAN NOTE
Lab Results   Component Value Date    HGBA1C 9 8 (H) 10/25/2022     Recent Labs     12/18/22  1119 12/18/22  1617 12/18/22 2052 12/19/22  0800   POCGLU 194* 165* 147* 128     Significantly reduced regimen from home due to poor oral intake  Goal sugar < 180  Continue Lantus to 10 units at night  Continue sliding scale insulin

## 2022-12-19 NOTE — ASSESSMENT & PLAN NOTE
Malnutrition Findings:   Adult Malnutrition type: Acute illness  Adult Degree of Malnutrition: Malnutrition of mild degree  Malnutrition Characteristics: Inadequate energy, Muscle loss                  360 Statement: mild protein calorie malnutrition with increased protein/calorie needs/wound healing, poor po intake <75% energy intake compared to estimated energy needs for > 5 days, muscle wasting/mild depletion/temples, treated with diet and supplements  BMI Findings:  Adult BMI Classifications: Morbid Obesity 40-44 9        Body mass index is 27 17 kg/m²

## 2022-12-19 NOTE — ASSESSMENT & PLAN NOTE
· She was initially admitted for nonhealing wound of the left lower extremity, requiring multiple endovascular interventions and ultimately underwent left AKA on 11/23/2022 and right AKA on 12/1/22  · S/p right stump debridement and vac placement 12/16/22  · For additional right stump washout and VAC exchange today- 12/19/2022  per surgery    · Continue pain control  · Eventual short-term rehab placement when medically stable  · Management per primary service

## 2022-12-19 NOTE — ASSESSMENT & PLAN NOTE
Intermittent fever; but currently afebrile x > 48 hours  UA positive, but likely contaminant rather than an actual urinary tract infection  Possibly post op fever  · Appreciate ID recommendations  Monitor off antibiotics     · procal normal  · If she spikes a temp >101 will obtain blood cultures, cxr, and re consult ID

## 2022-12-19 NOTE — ASSESSMENT & PLAN NOTE
S/p left aka 11/23/2022  · Post-op wound management per vascular surgery  · Await next step in management from vascular surgery, currently planned for possible right stump washout today  · Increase pain at stump site  Possible phantom pain  On gabapentin 400mg tid; up-titrate as needed

## 2022-12-19 NOTE — ANESTHESIA POSTPROCEDURE EVALUATION
Post-Op Assessment Note    CV Status:  Stable    Pain management: adequate     Mental Status:  Alert and awake   Hydration Status:  Euvolemic   PONV Controlled:  Controlled   Airway Patency:  Patent      Post Op Vitals Reviewed: Yes      Staff: Anesthesiologist         No notable events documented      BP (!) 177/70 (12/19/22 1247)    Temp 99 9 °F (37 7 °C) (12/19/22 1247)    Pulse 82 (12/19/22 1247)   Resp (!) 23 (12/19/22 1247)    SpO2 100 % (12/19/22 1247)

## 2022-12-19 NOTE — PROGRESS NOTES
[facility]  Progress Note - [address] 1960, 65 y o  female MRN: [id number]  Unit/Bed#: E4 -01 Encounter: [id number]  Primary Care Provider: ALEX Sims   Date and time admitted to hospital: 10/21/2022  7:58 PM    Mild protein-calorie malnutrition ([address] )  Assessment & Plan  Malnutrition Findings:   Adult Malnutrition type: Acute illness  Adult Degree of Malnutrition: Malnutrition of mild degree  Malnutrition Characteristics: Inadequate energy, Muscle loss                  360 Statement: mild protein calorie malnutrition with increased protein/calorie needs/wound healing, poor po intake <75% energy intake compared to estimated energy needs for > 5 days, muscle wasting/mild depletion/temples, treated with diet and supplements  BMI Findings:  Adult BMI Classifications: Morbid Obesity 40-44 9        Body mass index is 27 17 kg/m²  Febrile  Assessment & Plan  Intermittent fever; but currently afebrile x > 48 hours  UA positive, but likely contaminant rather than an actual urinary tract infection  Possibly post op fever  · Appreciate ID recommendations  Monitor off antibiotics  · procal normal  · If she spikes a temp >101 will obtain blood cultures, cxr, and re consult ID       HIT (heparin-induced thrombocytopenia)  Assessment & Plan  · Positive heparin antibody on 11/11/2022  · Completed 5 days of overlap with argatroban and Coumadin   · Continue coumadin tonight  · INR therapeutic  · Goal INR 2-3    Diabetic ulcer of heel associated with diabetes mellitus due to underlying condition [facility])  Assessment & Plan  S/p left aka 11/23/2022  · Post-op wound management per vascular surgery  · Await next step in management from vascular surgery, currently planned for possible right stump washout today  · Increase pain at stump site  Possible phantom pain  On gabapentin 400mg tid; up-titrate as needed      Acute on chronic anemia  Assessment & Plan  · She has had multiple  blood transfusions during this admission  · Blood transfusion was on  12/15/2022 prior to her surgery  · Monitor    Hypokalemia  Assessment & Plan  Replete  No results for input(s): K, MG in the last 72 hours  Repleted  Monitor potassium with addition of ACE inhibitor    Type 2 diabetes mellitus with hyperglycemia, with long-term current use of insulin [facility])  Assessment & Plan  Lab Results   Component Value Date    HGBA1C 9 8 (H) 10/25/2022     Recent Labs     12/18/22  1119 12/18/22  1617 12/18/22  2052 12/19/22  0800   POCGLU 194* 165* 147* 128     Significantly reduced regimen from home due to poor oral intake  Goal sugar < 180  Continue Lantus to 10 units at night  Continue sliding scale insulin    Stroke [facility])  Assessment & Plan  · Noted to have change in mental status on 11/13/2022  · CT confirmed moderate right parietal lobe stroke, likely embolic in the setting of HIT with thrombosis  · She was treated with argatroban/coumadin bridge  · Continue coumadin tonight   · INR therapeutic  · Goal INR 2-3    Benign essential hypertension  Assessment & Plan  Continue amlodipine 10 mg daily, doxazosin 4 mg at bedtime, metoprolol 25 mg daily, lisinopril 40 mg daily with hold parameters    Depression, recurrent (HCC)  Assessment & Plan  Evaluated by Psychiatry x 2 on this admission  ·  continue Zoloft 175 mg daily, Wellbutrin 150 mg daily, Seroquel 25 mg at bedtime  * PAD (peripheral artery disease) ([facility] [address] )  Assessment & Plan  · She was initially admitted for nonhealing wound of the left lower extremity, requiring multiple endovascular interventions and ultimately underwent left AKA on 11/23/2022 and right AKA on 12/1/22  · S/p right stump debridement and vac placement 12/16/22  · For additional right stump washout and VAC exchange today- 12/19/2022  per surgery    · Continue pain control  · Eventual short-term rehab placement when medically stable  · Management per primary service    Acute respiratory failure with hypoxia (HCC)-resolved as of 2022  Assessment & Plan  Multifactorial secondary to atelectasis, hypoventilation and sedentary  Resolved  VTE Pharmacologic Prophylaxis:   Pharmacologic: Warfarin (Coumadin)  Mechanical VTE Prophylaxis in Place: No    Patient Centered Rounds: I have performed bedside rounds with nursing staff today  Discussions with Specialists or Other Care Team Provider:     Education and Discussions with Family / Patient: Care plan discussed with patient who voiced understanding and agrees with recommendations  Time Spent for Care: 45 minutes  More than 50% of total time spent on counseling and coordination of care as described above  Current Length of Stay: 58 day(s)    Current Patient Status: Inpatient   Certification Statement: The patient will continue to require additional inpatient hospital stay due to Treatment of for vascular disease/bilateral AKA    Discharge Plan: As per primary team    Code Status: Level 1 - Full Code      Subjective:   Patient seen and examined at bedside, no acute distress and states pain was well controlled  To undergo right stump washout today with application of wound VAC as per primary team   Labs and vitals stable, INR 2 19, continue Coumadin  Monitor on morning labs and continue present plan for blood glucose levels as well as hypertension  Objective:     Vitals:   Temp (24hrs), Av 1 °F (37 3 °C), Min:98 °F (36 7 °C), Max:100 3 °F (37 9 °C)    Temp:  [98 °F (36 7 °C)-100 3 °F (37 9 °C)] 100 3 °F (37 9 °C)  HR:  [82-88] 86  Resp:  [12-23] 20  BP: (134-177)/(65-89) 160/77  SpO2:  [96 %-100 %] 96 %  Body mass index is 27 17 kg/m²  Input and Output Summary (last 24 hours): Intake/Output Summary (Last 24 hours) at 2022 1625  Last data filed at 2022 1332  Gross per 24 hour   Intake 1260 ml   Output 500 ml   Net 760 ml       Physical Exam:     Physical Exam  Vitals and nursing note reviewed  Constitutional:       General: She is not in acute distress  Appearance: She is well-developed  HENT:      Head: Normocephalic and atraumatic  Eyes:      Conjunctiva/sclera: Conjunctivae normal    Cardiovascular:      Rate and Rhythm: Normal rate and regular rhythm  Heart sounds: No murmur heard  Pulmonary:      Effort: Pulmonary effort is normal  No respiratory distress  Breath sounds: Normal breath sounds  Abdominal:      Palpations: Abdomen is soft  Tenderness: There is no abdominal tenderness  Musculoskeletal:         General: Tenderness and deformity present  No swelling  Cervical back: Neck supple  Comments: Bilateral AKA   Skin:     General: Skin is warm and dry  Neurological:      Mental Status: She is alert  Psychiatric:         Mood and Affect: Mood normal          Behavior: Behavior normal            Additional Data:     Labs:    Results from last 7 days   Lab Units 12/19/22  0448   WBC Thousand/uL 6 95   HEMOGLOBIN g/dL 7 5*   HEMATOCRIT % 25 5*   PLATELETS Thousands/uL 262         Results from last 7 days   Lab Units 12/19/22  0448   INR  2 19*     Results from last 7 days   Lab Units 12/19/22  1559 12/19/22  1252 12/19/22  0800 12/18/22  2052 12/18/22  1617 12/18/22  1119 12/18/22  0720 12/17/22  2102 12/17/22  1604 12/17/22  1155 12/17/22  0804 12/16/22  2117   POC GLUCOSE mg/dl 136 126 128 147* 165* 194* 145* 175* 188* 142* 152* 116                   * I Have Reviewed All Lab Data Listed Above  * Additional Pertinent Lab Tests Reviewed:  All Labs Within Last 24 Hours Reviewed    Imaging:    Imaging Reports Reviewed Today Include:   Imaging Personally Reviewed by Myself Includes:      Recent Cultures (last 7 days):           Last 24 Hours Medication List:   Current Facility-Administered Medications   Medication Dose Route Frequency Provider Last Rate   • acetaminophen  650 mg Oral Q6H PRN Destiny Allen DO     • ALPRAZolam  0 25 mg Oral Daily PRN Marlon Setting Nadira Camarillo, DO     • aluminum-magnesium hydroxide-simethicone  30 mL Oral Q4H [facility]N [facility]     • amLODIPine  10 mg Oral Daily [facility], DO     • atorvastatin  40 mg Oral Daily With [address], DO     • buPROPion  150 mg Oral Daily [facility], DO     • cefazolin  2,000 mg Intravenous On Call To [facility], DO     • clopidogrel  75 mg Oral Daily [facility], DO     • collagenase   Topical Daily [facility], DO     • doxazosin  4 mg Oral HS [facility], DO     • gabapentin  400 mg Oral TID [facility], DO     • insulin glargine  10 Units Subcutaneous HS [facility], DO     • insulin lispro  1-6 Units Subcutaneous TID AC [facility], DO     • labetalol  10 mg Intravenous Q6H PRN [facility], DO     • lisinopril  40 mg Oral Daily [facility], DO     • methocarbamol  1,000 mg Oral [facility], DO     • metoclopramide  10 mg Intravenous Q6H PRN [facility] Johnny, DO     • metoprolol succinate  25 mg Oral Daily [facility], DO     • naloxone  0 04 mg Intravenous Q1MIN PRN [facility] Johnny, DO     • nystatin   Topical BID [facility], DO     • ondansetron  4 mg Intravenous Q6H PRN [facility] Johnny, DO     • oxyCODONE  10 mg Oral Q12H [facility], DO     • oxyCODONE  2 5 mg Oral Q4H PRN [facility] Johnny, DO      Or   • oxyCODONE  5 mg Oral Q4H PRN [facility] Johnny, DO      Or   • oxyCODONE  7 5 mg Oral Q4H PRN [facility] Johnny, DO     • pantoprazole  40 mg Oral Early Morning [facility], DO     • polyethylene glycol  17 g Oral BID [facility], DO     • QUEtiapine  25 mg Oral HS [facility], DO     • senna  1 tablet Oral BID [facility], DO     • sertraline  175 mg Oral Daily [facility], DO     • sodium chloride  100 mL/hr Intravenous Continuous Marchneva Turner,  mL/hr (12/19/22 1247)   • warfarin  4 mg Oral Daily (warfarin) Melba Rock DO          Today, Patient Was Seen By: Nadia Adkins MD    ** Please Note: Dictation voice to text software may have been used in the creation of this document   **

## 2022-12-19 NOTE — PROGRESS NOTES
[facility]  Progress Note - [address] 1960, 65 y o  female MRN: [id number]  Unit/Bed#: E4 -01 Encounter: [id number]  Primary Care Provider: ALEX Akbar   Date and time admitted to hospital: 10/21/2022  7:58 PM    * PAD (peripheral artery disease) [facility])  Assessment & Plan  65year old female former smoker w/HTN, HLD, uncontrolled type II DM (A1c 9 8), hx CVA, presenting with nonhealing extensive L heel ulceration and R hallux and 5th digit ulceration  Vascular surgery consulted for input  S/p multiple b/l endovascular interventions  JUSTIN  R hemispheric CVA    S/p L AKA 11/23/22 [facility])  S/p R AKA, wound debridement 12/1/22 [facility])  S/p R AKA wound/stump washout and VAC placement 12/16/22 [facility])      Recommendations:  - Bilateral tissue loss in the setting of uncontrolled type II DM and NYDIA on admission, now s/p multiple angiograms w/intervention    - Endovascular interventions, c/b atheroembolic event to the RLE and JUSTIN with R hemispheric CVA  - Now s/p L AKA on 11/23/22 and R AKA 12/1/22 w/ proximal thigh wound debridements    - R AKA stump with incisional dehiscence and thigh wounds requiring debridement  Taken to the OR on 12/16 for washout and VAC placement  Plan for VAC change in the OR today  - Medical management with statin, Plavix  - Continue coumadin, INR 2 19  - Medical management and glucose control per SLIM  - Continue oral pain regimen, adjusted to long acting which appears to be controlling patient's pain well   - Appreciate psych input and management - addition of wellbutrin and adjustment of zoloft           Subjective: Patient seen and examined  Offers no complaints  Plan for OR for VAC change today    Vitals:  /81 (BP Location: Left arm)   Pulse 86   Temp 98 °F (36 7 °C) (Temporal)   Resp 20   Ht 5' 4" (1 626 m)   Wt 71 8 kg (158 lb 4 6 oz)   SpO2 96%   BMI 27 17 kg/m²     I/Os:  I/O last 3 completed shifts:   In: 1430 [P O :1430]  Out: 6927 [Urine:1275; Drains:200]  No intake/output data recorded  Lab Results and Cultures:   CBC with diff: Lab Results   Component Value Date    WBC 6 95 12/19/2022    HGB 7 5 (L) 12/19/2022    HCT 25 5 (L) 12/19/2022    MCV 89 12/19/2022     12/19/2022    MCH 26 1 (L) 12/19/2022    MCHC 29 4 (L) 12/19/2022    RDW 16 6 (H) 12/19/2022    MPV 8 1 (L) 12/19/2022    NRBC 0 12/05/2022   ,   BMP/CMP:  Lab Results   Component Value Date    SODIUM 141 12/11/2022    K 3 6 12/11/2022     12/11/2022    CO2 28 12/11/2022    BUN 12 12/11/2022    CREATININE 0 80 12/11/2022    CALCIUM 8 4 12/11/2022    AST 25 12/02/2022    ALT 12 12/02/2022    ALKPHOS 318 (H) 12/02/2022    EGFR 79 12/11/2022   ,   Lipid Panel: No results found for: CHOL,   Coags:   Lab Results   Component Value Date    PTT 95 (H) 12/12/2022    INR 2 19 (H) 12/19/2022   ,     Blood Culture:   Lab Results   Component Value Date    BLOODCX No Growth After 5 Days   11/13/2022    BLOODCX Staphylococcus coagulase negative (A) 11/13/2022   ,   Urinalysis: Lab Results   Component Value Date    COLORU Yellow 11/12/2022    CLARITYU Slightly Cloudy 11/12/2022    SPECGRAV 1 025 11/12/2022    PHUR 5 5 11/12/2022    PHUR 6 5 02/23/2018    LEUKOCYTESUR Trace (A) 11/12/2022    NITRITE Positive (A) 11/12/2022    GLUCOSEU Negative 11/12/2022    KETONESU Negative 11/12/2022    BILIRUBINUR Negative 11/12/2022    BLOODU Large (A) 11/12/2022   ,   Urine Culture:   Lab Results   Component Value Date    URINECX >100,000 cfu/ml Enterobacter cloacae complex (A) 11/12/2022    URINECX 70,000-79,000 cfu/ml Kluyveromyces marxianus (A) 11/12/2022   ,   Wound Culure: No results found for: WOUNDCULT    Medications:  Current Facility-Administered Medications   Medication Dose Route Frequency   • acetaminophen (TYLENOL) tablet 650 mg  650 mg Oral Q6H PRN   • ALPRAZolam (XANAX) tablet 0 25 mg  0 25 mg Oral Daily PRN   • aluminum-magnesium hydroxide-simethicone (MYLANTA) oral suspension 30 mL  30 mL Oral Q4H PRN   • amLODIPine (NORVASC) tablet 10 mg  10 mg Oral Daily   • atorvastatin (LIPITOR) tablet 40 mg  40 mg Oral Daily With Dinner   • buPROPion (WELLBUTRIN XL) 24 hr tablet 150 mg  150 mg Oral Daily   • ceFAZolin (ANCEF) IVPB (premix in dextrose) 2,000 mg 50 mL  2,000 mg Intravenous Once   • ceFAZolin (ANCEF) IVPB (premix in dextrose) 2,000 mg 50 mL  2,000 mg Intravenous On Call To OR   • chlorhexidine (PERIDEX) 0 12 % oral rinse 15 mL  15 mL Swish & Spit Once   • clopidogrel (PLAVIX) tablet 75 mg  75 mg Oral Daily   • collagenase (SANTYL) ointment   Topical Daily   • doxazosin (CARDURA) tablet 4 mg  4 mg Oral HS   • gabapentin (NEURONTIN) capsule 400 mg  400 mg Oral TID   • insulin glargine (LANTUS) subcutaneous injection 10 Units 0 1 mL  10 Units Subcutaneous HS   • insulin lispro (HumaLOG) 100 units/mL subcutaneous injection 1-6 Units  1-6 Units Subcutaneous TID AC   • labetalol (NORMODYNE) injection 10 mg  10 mg Intravenous Q6H PRN   • lisinopril (ZESTRIL) tablet 40 mg  40 mg Oral Daily   • methocarbamol (ROBAXIN) tablet 1,000 mg  1,000 mg Oral Q8H [facility]   • metoclopramide (REGLAN) injection 10 mg  10 mg Intravenous Q6H PRN   • metoprolol succinate (TOPROL-XL) 24 hr tablet 25 mg  25 mg Oral Daily   • naloxone (NARCAN) 0 04 mg/mL syringe 0 04 mg  0 04 mg Intravenous Q1MIN PRN   • nystatin (MYCOSTATIN) powder   Topical BID   • ondansetron (ZOFRAN) injection 4 mg  4 mg Intravenous Q6H PRN   • oxyCODONE (OxyCONTIN) 12 hr tablet 10 mg  10 mg Oral Q12H MICHELLE   • oxyCODONE (ROXICODONE) IR tablet 2 5 mg  2 5 mg Oral Q4H PRN    Or   • oxyCODONE (ROXICODONE) IR tablet 5 mg  5 mg Oral Q4H PRN    Or   • oxyCODONE (ROXICODONE) IR tablet 7 5 mg  7 5 mg Oral Q4H PRN   • pantoprazole (PROTONIX) EC tablet 40 mg  40 mg Oral Early Morning   • polyethylene glycol (MIRALAX) packet 17 g  17 g Oral BID   • QUEtiapine (SEROquel) tablet 25 mg  25 mg Oral HS   • senna (SENOKOT) tablet 8 6 mg  1 tablet Oral BID   • sertraline (ZOLOFT) tablet 175 mg  175 mg Oral Daily   • sodium chloride 0 9 % infusion  100 mL/hr Intravenous Continuous   • warfarin (COUMADIN) tablet 4 mg  4 mg Oral Daily (warfarin)       Imaging:  Reviewed    Physical Exam:    General: Alert and oriented  CV: Regular rate   Respiratory: Normal effort   Abdominal: Soft, non-distended   Extremities: Bilateral AKA, R stump with VAC in place   Neurologic: Grossly normal         Shola Garcia PA-C  12/19/2022

## 2022-12-20 ENCOUNTER — VBI (OUTPATIENT)
Dept: ADMINISTRATIVE | Facility: OTHER | Age: 62
End: 2022-12-20

## 2022-12-20 LAB
ALBUMIN SERPL BCP-MCNC: 1.5 G/DL (ref 3.5–5)
ALP SERPL-CCNC: 170 U/L (ref 46–116)
ALT SERPL W P-5'-P-CCNC: 10 U/L (ref 12–78)
ANION GAP SERPL CALCULATED.3IONS-SCNC: 10 MMOL/L (ref 4–13)
AST SERPL W P-5'-P-CCNC: 22 U/L (ref 5–45)
BASOPHILS # BLD AUTO: 0.03 THOUSANDS/ÂΜL (ref 0–0.1)
BASOPHILS NFR BLD AUTO: 1 % (ref 0–1)
BILIRUB SERPL-MCNC: 0.36 MG/DL (ref 0.2–1)
BUN SERPL-MCNC: 11 MG/DL (ref 5–25)
CALCIUM ALBUM COR SERPL-MCNC: 10.2 MG/DL (ref 8.3–10.1)
CALCIUM SERPL-MCNC: 8.2 MG/DL (ref 8.3–10.1)
CHLORIDE SERPL-SCNC: 103 MMOL/L (ref 96–108)
CO2 SERPL-SCNC: 27 MMOL/L (ref 21–32)
CREAT SERPL-MCNC: 0.72 MG/DL (ref 0.6–1.3)
EOSINOPHIL # BLD AUTO: 0.02 THOUSAND/ÂΜL (ref 0–0.61)
EOSINOPHIL NFR BLD AUTO: 0 % (ref 0–6)
ERYTHROCYTE [DISTWIDTH] IN BLOOD BY AUTOMATED COUNT: 16.7 % (ref 11.6–15.1)
GFR SERPL CREATININE-BSD FRML MDRD: 90 ML/MIN/1.73SQ M
GLUCOSE SERPL-MCNC: 121 MG/DL (ref 65–140)
GLUCOSE SERPL-MCNC: 170 MG/DL (ref 65–140)
GLUCOSE SERPL-MCNC: 88 MG/DL (ref 65–140)
GLUCOSE SERPL-MCNC: 89 MG/DL (ref 65–140)
GLUCOSE SERPL-MCNC: 90 MG/DL (ref 65–140)
HCT VFR BLD AUTO: 27.5 % (ref 34.8–46.1)
HGB BLD-MCNC: 8.3 G/DL (ref 11.5–15.4)
IMM GRANULOCYTES # BLD AUTO: 0.04 THOUSAND/UL (ref 0–0.2)
IMM GRANULOCYTES NFR BLD AUTO: 1 % (ref 0–2)
INR PPP: 2.87 (ref 0.84–1.19)
LYMPHOCYTES # BLD AUTO: 0.57 THOUSANDS/ÂΜL (ref 0.6–4.47)
LYMPHOCYTES NFR BLD AUTO: 11 % (ref 14–44)
MAGNESIUM SERPL-MCNC: 1.6 MG/DL (ref 1.6–2.6)
MCH RBC QN AUTO: 26.3 PG (ref 26.8–34.3)
MCHC RBC AUTO-ENTMCNC: 30.2 G/DL (ref 31.4–37.4)
MCV RBC AUTO: 87 FL (ref 82–98)
MONOCYTES # BLD AUTO: 0.52 THOUSAND/ÂΜL (ref 0.17–1.22)
MONOCYTES NFR BLD AUTO: 10 % (ref 4–12)
NEUTROPHILS # BLD AUTO: 4.19 THOUSANDS/ÂΜL (ref 1.85–7.62)
NEUTS SEG NFR BLD AUTO: 77 % (ref 43–75)
NRBC BLD AUTO-RTO: 0 /100 WBCS
PHOSPHATE SERPL-MCNC: 4.6 MG/DL (ref 2.3–4.1)
PLATELET # BLD AUTO: 228 THOUSANDS/UL (ref 149–390)
PMV BLD AUTO: 8.1 FL (ref 8.9–12.7)
POTASSIUM SERPL-SCNC: 3.6 MMOL/L (ref 3.5–5.3)
PROCALCITONIN SERPL-MCNC: 0.08 NG/ML
PROT SERPL-MCNC: 6.2 G/DL (ref 6.4–8.4)
PROTHROMBIN TIME: 29.9 SECONDS (ref 11.6–14.5)
RBC # BLD AUTO: 3.16 MILLION/UL (ref 3.81–5.12)
SODIUM SERPL-SCNC: 140 MMOL/L (ref 135–147)
WBC # BLD AUTO: 5.37 THOUSAND/UL (ref 4.31–10.16)

## 2022-12-20 PROCEDURE — 85610 PROTHROMBIN TIME: CPT | Performed by: STUDENT IN AN ORGANIZED HEALTH CARE EDUCATION/TRAINING PROGRAM

## 2022-12-20 PROCEDURE — 84100 ASSAY OF PHOSPHORUS: CPT | Performed by: STUDENT IN AN ORGANIZED HEALTH CARE EDUCATION/TRAINING PROGRAM

## 2022-12-20 PROCEDURE — 82948 REAGENT STRIP/BLOOD GLUCOSE: CPT

## 2022-12-20 PROCEDURE — 83735 ASSAY OF MAGNESIUM: CPT | Performed by: STUDENT IN AN ORGANIZED HEALTH CARE EDUCATION/TRAINING PROGRAM

## 2022-12-20 PROCEDURE — 99024 POSTOP FOLLOW-UP VISIT: CPT | Performed by: PHYSICIAN ASSISTANT

## 2022-12-20 PROCEDURE — 84145 PROCALCITONIN (PCT): CPT | Performed by: INTERNAL MEDICINE

## 2022-12-20 PROCEDURE — 85025 COMPLETE CBC W/AUTO DIFF WBC: CPT | Performed by: STUDENT IN AN ORGANIZED HEALTH CARE EDUCATION/TRAINING PROGRAM

## 2022-12-20 PROCEDURE — 80053 COMPREHEN METABOLIC PANEL: CPT | Performed by: STUDENT IN AN ORGANIZED HEALTH CARE EDUCATION/TRAINING PROGRAM

## 2022-12-20 PROCEDURE — 99233 SBSQ HOSP IP/OBS HIGH 50: CPT | Performed by: INTERNAL MEDICINE

## 2022-12-20 RX ORDER — WARFARIN SODIUM 3 MG/1
3 TABLET ORAL
Status: DISCONTINUED | OUTPATIENT
Start: 2022-12-20 | End: 2022-12-23

## 2022-12-20 RX ADMIN — PANTOPRAZOLE SODIUM 40 MG: 40 TABLET, DELAYED RELEASE ORAL at 05:54

## 2022-12-20 RX ADMIN — LISINOPRIL 40 MG: 20 TABLET ORAL at 08:42

## 2022-12-20 RX ADMIN — METHOCARBAMOL 1000 MG: 500 TABLET ORAL at 05:54

## 2022-12-20 RX ADMIN — NYSTATIN: 100000 POWDER TOPICAL at 08:43

## 2022-12-20 RX ADMIN — METHOCARBAMOL 1000 MG: 500 TABLET ORAL at 13:35

## 2022-12-20 RX ADMIN — OXYCODONE HYDROCHLORIDE 10 MG: 10 TABLET, FILM COATED, EXTENDED RELEASE ORAL at 08:42

## 2022-12-20 RX ADMIN — CLOPIDOGREL BISULFATE 75 MG: 75 TABLET ORAL at 08:42

## 2022-12-20 RX ADMIN — ACETAMINOPHEN 650 MG: 325 TABLET, FILM COATED ORAL at 13:35

## 2022-12-20 RX ADMIN — METOPROLOL SUCCINATE 25 MG: 25 TABLET, EXTENDED RELEASE ORAL at 08:42

## 2022-12-20 RX ADMIN — DOXAZOSIN 4 MG: 4 TABLET ORAL at 21:49

## 2022-12-20 RX ADMIN — ATORVASTATIN CALCIUM 40 MG: 40 TABLET, FILM COATED ORAL at 16:35

## 2022-12-20 RX ADMIN — COLLAGENASE SANTYL: 250 OINTMENT TOPICAL at 08:43

## 2022-12-20 RX ADMIN — METHOCARBAMOL 1000 MG: 500 TABLET ORAL at 21:49

## 2022-12-20 RX ADMIN — NYSTATIN: 100000 POWDER TOPICAL at 17:43

## 2022-12-20 RX ADMIN — ACETAMINOPHEN 650 MG: 325 TABLET, FILM COATED ORAL at 21:50

## 2022-12-20 RX ADMIN — QUETIAPINE FUMARATE 25 MG: 25 TABLET ORAL at 21:49

## 2022-12-20 RX ADMIN — GABAPENTIN 400 MG: 400 CAPSULE ORAL at 21:49

## 2022-12-20 RX ADMIN — GABAPENTIN 400 MG: 400 CAPSULE ORAL at 16:35

## 2022-12-20 RX ADMIN — WARFARIN SODIUM 3 MG: 3 TABLET ORAL at 17:42

## 2022-12-20 RX ADMIN — INSULIN LISPRO 1 UNITS: 100 INJECTION, SOLUTION INTRAVENOUS; SUBCUTANEOUS at 16:36

## 2022-12-20 RX ADMIN — GABAPENTIN 400 MG: 400 CAPSULE ORAL at 08:42

## 2022-12-20 RX ADMIN — INSULIN GLARGINE 10 UNITS: 100 INJECTION, SOLUTION SUBCUTANEOUS at 21:51

## 2022-12-20 RX ADMIN — BUPROPION HYDROCHLORIDE 150 MG: 150 TABLET, FILM COATED, EXTENDED RELEASE ORAL at 08:43

## 2022-12-20 RX ADMIN — AMLODIPINE BESYLATE 10 MG: 10 TABLET ORAL at 08:43

## 2022-12-20 RX ADMIN — OXYCODONE HYDROCHLORIDE 10 MG: 10 TABLET, FILM COATED, EXTENDED RELEASE ORAL at 21:49

## 2022-12-20 RX ADMIN — SERTRALINE HYDROCHLORIDE 175 MG: 100 TABLET ORAL at 08:42

## 2022-12-20 NOTE — PLAN OF CARE
Problem: Potential for Falls  Goal: Patient will remain free of falls  Description: INTERVENTIONS:  - Educate patient/family on patient safety including physical limitations  - Instruct patient to call for assistance with activity   - Consult OT/PT to assist with strengthening/mobility   - Keep Call bell within reach  - Keep bed low and locked with side rails adjusted as appropriate  - Keep care items and personal belongings within reach  - Initiate and maintain comfort rounds  - Make Fall Risk Sign visible to staff  - Offer Toileting every 2 Hours, in advance of need  - Initiate/Maintain bed  chair alarm  - Obtain necessary fall risk management equipment: bed chair alarm, call bell, gripper sock, walker, bedside commode  - Apply yellow socks and bracelet for high fall risk patients  - Consider moving patient to room near nurses station  Outcome: Progressing     Problem: MOBILITY - ADULT  Goal: Maintain or return to baseline ADL function  Description: INTERVENTIONS:  -  Assess patient's ability to carry out ADLs; assess patient's baseline for ADL function and identify physical deficits which impact ability to perform ADLs (bathing, care of mouth/teeth, toileting, grooming, dressing, etc )  - Assess/evaluate cause of self-care deficits   - Assess range of motion  - Assess patient's mobility; develop plan if impaired  - Assess patient's need for assistive devices and provide as appropriate  - Encourage maximum independence but intervene and supervise when necessary  - Involve family in performance of ADLs  - Assess for home care needs following discharge   - Consider OT consult to assist with ADL evaluation and planning for discharge  - Provide patient education as appropriate  Outcome: Progressing  Goal: Maintains/Returns to pre admission functional level  Description: INTERVENTIONS:  - Perform BMAT or MOVE assessment daily    - Set and communicate daily mobility goal to care team and patient/family/caregiver     - Collaborate with rehabilitation services on mobility goals if consulted  - Assist patient in repositioning every 2 hours    - Dangle patient 3 times a day  - Stand patient 3 times a day  - Out of bed to chair as tolerated  - Out of bed for meals  - Out of bed for toileting  - Record patient progress and toleration of activity level   Outcome: Progressing     Problem: PAIN - ADULT  Goal: Verbalizes/displays adequate comfort level or baseline comfort level  Description: Interventions:  - Encourage patient to monitor pain and request assistance  - Assess pain using appropriate pain scale  - Administer analgesics based on type and severity of pain and evaluate response  - Implement non-pharmacological measures as appropriate and evaluate response  - Consider cultural and social influences on pain and pain management  - Notify physician/advanced practitioner if interventions unsuccessful or patient reports new pain  Outcome: Progressing     Problem: INFECTION - ADULT  Goal: Absence or prevention of progression during hospitalization  Description: INTERVENTIONS:  - Assess and monitor for signs and symptoms of infection  - Monitor lab/diagnostic results  - Monitor all insertion sites, i e  indwelling lines, tubes, and drains  - Administer medications as ordered  - Instruct and encourage patient and family to use good hand hygiene technique  Outcome: Progressing     Problem: SAFETY ADULT  Goal: Patient will remain free of falls  Description: INTERVENTIONS:  - Educate patient/family on patient safety including physical limitations  - Instruct patient to call for assistance with activity   - Consult OT/PT to assist with strengthening/mobility   - Keep Call bell within reach  - Keep bed low and locked with side rails adjusted as appropriate  - Keep care items and personal belongings within reach  - Initiate and maintain comfort rounds  - Make Fall Risk Sign visible to staff  - Offer Toileting every 2 Hours, in advance of need  - Initiate/Maintain bed  chair alarm  - Obtain necessary fall risk management equipment: bed chair alarm, call bell, gripper sock, walker, bedside commode  - Apply yellow socks and bracelet for high fall risk patients  - Consider moving patient to room near nurses station  Outcome: Progressing  Goal: Maintain or return to baseline ADL function  Description: INTERVENTIONS:  -  Assess patient's ability to carry out ADLs; assess patient's baseline for ADL function and identify physical deficits which impact ability to perform ADLs (bathing, care of mouth/teeth, toileting, grooming, dressing, etc )  - Assess/evaluate cause of self-care deficits   - Assess range of motion  - Assess patient's mobility; develop plan if impaired  - Assess patient's need for assistive devices and provide as appropriate  - Encourage maximum independence but intervene and supervise when necessary  - Involve family in performance of ADLs  - Assess for home care needs following discharge   - Consider OT consult to assist with ADL evaluation and planning for discharge  - Provide patient education as appropriate  Outcome: Progressing  Goal: Maintains/Returns to pre admission functional level  Description: INTERVENTIONS:  - Perform BMAT or MOVE assessment daily    - Set and communicate daily mobility goal to care team and patient/family/caregiver  - Collaborate with rehabilitation services on mobility goals if consulted  - Assist patient in repositioning every 2 hours    - Dangle patient 3 times a day  - Stand patient 3 times a day  - Out of bed to chair as tolerated  - Out of bed for meals  - Out of bed for toileting  - Record patient progress and toleration of activity level   Outcome: Progressing     Problem: DISCHARGE PLANNING  Goal: Discharge to home or other facility with appropriate resources  Description: INTERVENTIONS:  - Identify barriers to discharge w/patient   - Arrange for needed discharge resources and transportation as appropriate  - Identify discharge learning needs  - Refer to Case Management Department for coordinating discharge planning if the patient needs post-hospital services based on physician/advanced practitioner order   Outcome: Progressing     Problem: Knowledge Deficit  Goal: Patient/family/caregiver demonstrates understanding of disease process, treatment plan, medications, and discharge instructions  Description: Complete learning assessment and assess knowledge base    Interventions:  - Provide teaching at level of understanding  - Provide teaching via preferred learning methods  Outcome: Progressing     Problem: METABOLIC, FLUID AND ELECTROLYTES - ADULT  Goal: Electrolytes maintained within normal limits  Description: INTERVENTIONS:  - Monitor labs and assess patient for signs and symptoms of electrolyte imbalances  - Administer electrolyte replacement as ordered  - Monitor response to electrolyte replacements, including repeat lab results as appropriate  - Instruct patient on fluid and nutrition as appropriate  Outcome: Progressing  Goal: Fluid balance maintained  Description: INTERVENTIONS:  - Monitor labs   - Monitor I/O and WT  - Instruct patient on fluid and nutrition as appropriate  - Assess for signs & symptoms of volume excess or deficit  Outcome: Progressing  Goal: Glucose maintained within target range  Description: INTERVENTIONS:  - Monitor Blood Glucose as ordered  - Assess for signs and symptoms of hyperglycemia and hypoglycemia  - Administer ordered medications to maintain glucose within target range  - Assess nutritional intake and initiate nutrition service referral as needed  Outcome: Progressing     Problem: SKIN/TISSUE INTEGRITY - ADULT  Goal: Skin Integrity remains intact(Skin Breakdown Prevention)  Description: Assess:  -Perform Nicanor assessment every shift  -Clean and moisturize skin every shift  -Inspect skin when repositioning, toileting, and assisting with ADLS  -Assess under medical devices   -Assess extremities for adequate circulation and sensation     Bed Management:  -Have minimal linens on bed & keep smooth, unwrinkled  -Change linens as needed when moist or perspiring  -Avoid sitting or lying in one position for more than 2 hours while in bed    Toileting:  -Offer bedside commode  -Assess for incontinence every 2 hours  -Use incontinent care products after each incontinent episode     Activity:  -Mobilize patient 3 times a day  -Encourage or provide ROM exercises   -Turn and reposition patient every 2 Hours  -Use appropriate equipment to lift or move patient in bed  -Instruct/ Assist with weight shifting every 15 minutes when out of bed in chair  -Consider limitation of chair time 1 hour intervals    Skin Care:  -Avoid use of baby powder, tape, friction and shearing, hot water or constrictive clothing  -Relieve pressure over bony prominences using waffle cushion when in chair  -Do not massage red bony areas    Outcome: Progressing  Goal: Incision(s), wounds(s) or drain site(s) healing without S/S of infection  Description: INTERVENTIONS  - Assess and document dressing, incision, wound bed, drain sites and surrounding tissue  - Provide patient and family education  - Perform skin care/dressing changes everyday as ordered  Outcome: Progressing  Goal: Pressure injury heals and does not worsen  Description: Interventions:  - Implement low air loss mattress or specialty surface (Criteria met)  - Apply silicone foam dressing  - Instruct/assist with weight shifting every 15 minutes when in chair   - Limit chair time to 1 hour intervals  - Use special pressure reducing interventions such as waffle cushion when in chair   - Perform passive or active ROM   - Turn and reposition patient & offload bony prominences every 2 hours   - Utilize friction reducing device or surface for transfers   - Consider consults to  interdisciplinary teams such as wound care, PT/OT  - Use incontinent care products after each incontinent episode   - Consider nutrition services referral as needed  Outcome: Progressing     Problem: MUSCULOSKELETAL - ADULT  Goal: Maintain or return mobility to safest level of function  Description: INTERVENTIONS:  - Assess patient's ability to carry out ADLs; assess patient's baseline for ADL function and identify physical deficits which impact ability to perform ADLs (bathing, care of mouth/teeth, toileting, grooming, dressing, etc )  - Assess/evaluate cause of self-care deficits   - Assess range of motion  - Assess patient's mobility  - Assess patient's need for assistive devices and provide as appropriate  - Encourage maximum independence but intervene and supervise when necessary  - Involve family in performance of ADLs  - Assess for home care needs following discharge   - Consider OT consult to assist with ADL evaluation and planning for discharge  - Provide patient education as appropriate  Outcome: Progressing  Goal: Maintain proper alignment of affected body part  Description: INTERVENTIONS:  - Support, maintain and protect limb and body alignment  - Provide patient/ family with appropriate education  Outcome: Progressing     Problem: CARDIOVASCULAR - ADULT  Goal: Maintains optimal cardiac output and hemodynamic stability  Description: INTERVENTIONS:  - Monitor I/O, vital signs and rhythm  - Monitor for S/S and trends of decreased cardiac output  - Administer and titrate ordered vasoactive medications to optimize hemodynamic stability  - Assess quality of pulses, skin color and temperature  - Assess for signs of decreased coronary artery perfusion  - Instruct patient to report change in severity of symptoms  Outcome: Progressing     Problem: RESPIRATORY - ADULT  Goal: Achieves optimal ventilation and oxygenation  Description: INTERVENTIONS:  - Assess for changes in respiratory status  - Assess for changes in mentation and behavior  - Position to facilitate oxygenation and minimize respiratory effort  - Oxygen administered by appropriate delivery if ordered  - Initiate smoking cessation education as indicated  - Encourage broncho-pulmonary hygiene including cough, deep breathe, Incentive Spirometry  - Assess the need for suctioning and aspirate as needed  - Assess and instruct to report SOB or any respiratory difficulty  - Respiratory Therapy support as indicated  Outcome: Progressing     Problem: GENITOURINARY - ADULT  Goal: Maintains or returns to baseline urinary function  Description: INTERVENTIONS:  - Assess urinary function  - Encourage oral fluids to ensure adequate hydration if ordered  - Administer IV fluids as ordered to ensure adequate hydration  - Administer ordered medications as needed  - Offer frequent toileting  - Follow urinary retention protocol if ordered  Outcome: Progressing  Goal: Absence of urinary retention  Description: INTERVENTIONS:  - Assess patient’s ability to void and empty bladder  - Monitor I/O  - Bladder scan as needed  - Discuss with physician/AP medications to alleviate retention as needed  - Discuss catheterization for long term situations as appropriate  Outcome: Progressing     Problem: HEMATOLOGIC - ADULT  Goal: Maintains hematologic stability  Description: INTERVENTIONS  - Assess for signs and symptoms of bleeding or hemorrhage  - Monitor labs  - Administer supportive blood products/factors as ordered and appropriate  Outcome: Progressing

## 2022-12-20 NOTE — ASSESSMENT & PLAN NOTE
Lab Results   Component Value Date    HGBA1C 9 8 (H) 10/25/2022     Recent Labs     12/19/22  1252 12/19/22  1559 12/19/22 2054 12/20/22  0732   POCGLU 126 136 135 89     Significantly reduced regimen from home due to poor oral intake  Goal sugar < 180  Continue Lantus to 10 units at night  Continue sliding scale insulin

## 2022-12-20 NOTE — ASSESSMENT & PLAN NOTE
· Positive heparin antibody on 11/11/2022  · Completed 5 days of overlap with argatroban and Coumadin   · Continue coumadin for minimum of 4 weeks  · INR therapeutic  · Goal INR 2-3

## 2022-12-20 NOTE — ASSESSMENT & PLAN NOTE
65year old female former smoker w/HTN, HLD, uncontrolled type II DM (A1c 9 8), hx CVA, presenting with nonhealing extensive L heel ulceration and R hallux and 5th digit ulceration  Vascular surgery consulted for input  S/p multiple b/l endovascular interventions  JUSTIN  R hemispheric CVA    S/p L AKA 11/23/22 [facility])  S/p R AKA, wound debridement 12/1/22 [facility])  S/p R AKA wound/stump washout and VAC placement 12/16/22 [facility])  S/p R AKA wound/stump VAC change, L AKA stump washout/packing 12/19/22 ([facility])    Hgb 8 3  WBC 5 37  sCr/eGFR 0 72/90    Recommendations:  - Bilateral tissue loss in the setting of uncontrolled type II DM and NYDIA on admission, now s/p multiple angiograms w/intervention    - Endovascular interventions, c/b atheroembolic event to the RLE and JUSTIN with R hemispheric CVA  - Now s/p L AKA on 11/23/22 and R AKA 12/1/22 w/ proximal thigh wound debridements    - R AKA stump with incisional dehiscence and thigh wounds requiring debridement  Taken to the OR on 12/16 for washout and VAC placement with return to the OR yesterday for VAC change  L AKA stump with some drainage, multiple staples removed from the incision and iodoform 1' packing placed in small opening  - Patient complains of feeling "unwell", febrile @ tmax 101 5 overnight  Could be secondary to atelectasis  Denies cough or other symptoms   Will continue to monitor closely   - Incentive spirometer   - Bedside dressing change tomorrow  - Medical management with statin, Plavix  - Continue coumadin, INR 2 87  - Medical management and glucose control per SLIM  - Continue oral pain regimen, adjusted to long acting which appears to be controlling patient's pain well   - Appreciate psych input and management - addition of wellbutrin and adjustment of zoloft   - Will d/w Dr Santos Artist

## 2022-12-20 NOTE — PROGRESS NOTES
[facility]  Progress Note - [facility] 1960, 65 y o  female MRN: [id number]  Unit/Bed#: E4 -01 Encounter: [id number]  Primary Care Provider: ALEX Mora   Date and time admitted to hospital: 10/21/2022  7:58 PM    * PAD (peripheral artery disease) [facility])  Assessment & Plan  65year old female former smoker w/HTN, HLD, uncontrolled type II DM (A1c 9 8), hx CVA, presenting with nonhealing extensive L heel ulceration and R hallux and 5th digit ulceration  Vascular surgery consulted for input  S/p multiple b/l endovascular interventions  JUSTIN  R hemispheric CVA    S/p L AKA 11/23/22 [facility])  S/p R AKA, wound debridement 12/1/22 [facility])  S/p R AKA wound/stump washout and VAC placement 12/16/22 [facility])  S/p R AKA wound/stump VAC change, L AKA stump washout/packing 12/19/22 ([facility])    Hgb 8 3  WBC 5 37  sCr/eGFR 0 72/90    Recommendations:  - Bilateral tissue loss in the setting of uncontrolled type II DM and NYDIA on admission, now s/p multiple angiograms w/intervention    - Endovascular interventions, c/b atheroembolic event to the RLE and JUSTIN with R hemispheric CVA  - Now s/p L AKA on 11/23/22 and R AKA 12/1/22 w/ proximal thigh wound debridements    - R AKA stump with incisional dehiscence and thigh wounds requiring debridement  Taken to the OR on 12/16 for washout and VAC placement with return to the OR yesterday for VAC change  L AKA stump with some drainage, multiple staples removed from the incision and iodoform 1' packing placed in small opening  - Patient complains of feeling "unwell", febrile @ tmax 101 5 overnight  Could be secondary to atelectasis  Denies cough or other symptoms   Will continue to monitor closely   - Incentive spirometer   - Bedside dressing change tomorrow  - Medical management with statin, Plavix  - Continue coumadin, INR 2 87  - Medical management and glucose control per SLIM  - Continue oral pain regimen, adjusted to long acting which appears to be controlling patient's pain well   - Appreciate psych input and management - addition of wellbutrin and adjustment of zoloft   - Will d/w Dr Lucinda Groves      Subjective: Patient seen and examined  Stump pain is controlled  Reports feeling "unwell"  Reports chills, noted to be febrile overnight  Denies cough, SOB, abdominal pain or other symptoms  Vitals:  /64 (BP Location: Left arm)   Pulse 92   Temp 98 2 °F (36 8 °C) (Temporal)   Resp 20   Ht 5' 4" (1 626 m)   Wt 71 7 kg (158 lb 1 1 oz)   SpO2 93%   BMI 27 13 kg/m²     I/Os:  I/O last 3 completed shifts: In: 4231 [P O :710; I V :550]  Out: 1250 [Urine:1200; Drains:50]  No intake/output data recorded  Lab Results and Cultures:   CBC with diff:   Lab Results   Component Value Date    WBC 5 37 12/20/2022    HGB 8 3 (L) 12/20/2022    HCT 27 5 (L) 12/20/2022    MCV 87 12/20/2022     12/20/2022    MCH 26 3 (L) 12/20/2022    MCHC 30 2 (L) 12/20/2022    RDW 16 7 (H) 12/20/2022    MPV 8 1 (L) 12/20/2022    NRBC 0 12/20/2022   ,   BMP/CMP:  Lab Results   Component Value Date    SODIUM 140 12/20/2022    K 3 6 12/20/2022     12/20/2022    CO2 27 12/20/2022    BUN 11 12/20/2022    CREATININE 0 72 12/20/2022    CALCIUM 8 2 (L) 12/20/2022    AST 22 12/20/2022    ALT 10 (L) 12/20/2022    ALKPHOS 170 (H) 12/20/2022    EGFR 90 12/20/2022   ,   Lipid Panel: No results found for: CHOL,   Coags:   Lab Results   Component Value Date    PTT 95 (H) 12/12/2022    INR 2 87 (H) 12/20/2022   ,     Blood Culture:   Lab Results   Component Value Date    BLOODCX No Growth After 5 Days   11/13/2022    BLOODCX Staphylococcus coagulase negative (A) 11/13/2022   ,   Urinalysis:   Lab Results   Component Value Date    COLORU Yellow 11/12/2022    CLARITYU Slightly Cloudy 11/12/2022    SPECGRAV 1 025 11/12/2022    PHUR 5 5 11/12/2022    PHUR 6 5 02/23/2018    LEUKOCYTESUR Trace (A) 11/12/2022    NITRITE Positive (A) 11/12/2022    GLUCOSEU Negative 11/12/2022    KETONESU Negative 11/12/2022    BILIRUBINUR Negative 11/12/2022    BLOODU Large (A) 11/12/2022   ,   Urine Culture:   Lab Results   Component Value Date    URINECX >100,000 cfu/ml Enterobacter cloacae complex (A) 11/12/2022    URINECX 70,000-79,000 cfu/ml Klisabelomyces marxianus (A) 11/12/2022   ,   Wound Culure: No results found for: WOUNDCULT    Medications:  Current Facility-Administered Medications   Medication Dose Route Frequency   • acetaminophen (TYLENOL) tablet 650 mg  650 mg Oral Q6H PRN   • ALPRAZolam (XANAX) tablet 0 25 mg  0 25 mg Oral Daily PRN   • aluminum-magnesium hydroxide-simethicone (MYLANTA) oral suspension 30 mL  30 mL Oral Q4H PRN   • amLODIPine (NORVASC) tablet 10 mg  10 mg Oral Daily   • atorvastatin (LIPITOR) tablet 40 mg  40 mg Oral Daily With Dinner   • buPROPion (WELLBUTRIN XL) 24 hr tablet 150 mg  150 mg Oral Daily   • clopidogrel (PLAVIX) tablet 75 mg  75 mg Oral Daily   • collagenase (SANTYL) ointment   Topical Daily   • doxazosin (CARDURA) tablet 4 mg  4 mg Oral HS   • gabapentin (NEURONTIN) capsule 400 mg  400 mg Oral TID   • insulin glargine (LANTUS) subcutaneous injection 10 Units 0 1 mL  10 Units Subcutaneous HS   • insulin lispro (HumaLOG) 100 units/mL subcutaneous injection 1-6 Units  1-6 Units Subcutaneous TID AC   • labetalol (NORMODYNE) injection 10 mg  10 mg Intravenous Q6H PRN   • lisinopril (ZESTRIL) tablet 40 mg  40 mg Oral Daily   • methocarbamol (ROBAXIN) tablet 1,000 mg  1,000 mg Oral Q8H [address]   • metoclopramide (REGLAN) injection 10 mg  10 mg Intravenous Q6H PRN   • metoprolol succinate (TOPROL-XL) 24 hr tablet 25 mg  25 mg Oral Daily   • naloxone (NARCAN) 0 04 mg/mL syringe 0 04 mg  0 04 mg Intravenous Q1MIN PRN   • nystatin (MYCOSTATIN) powder   Topical BID   • ondansetron (ZOFRAN) injection 4 mg  4 mg Intravenous Q6H PRN   • oxyCODONE (OxyCONTIN) 12 hr tablet 10 mg  10 mg Oral Q12H MICHELLE   • oxyCODONE (ROXICODONE) IR tablet 2 5 mg  2 5 mg Oral Q4H PRN Or   • oxyCODONE (ROXICODONE) IR tablet 5 mg  5 mg Oral Q4H PRN    Or   • oxyCODONE (ROXICODONE) IR tablet 7 5 mg  7 5 mg Oral Q4H PRN   • pantoprazole (PROTONIX) EC tablet 40 mg  40 mg Oral Early Morning   • polyethylene glycol (MIRALAX) packet 17 g  17 g Oral BID   • QUEtiapine (SEROquel) tablet 25 mg  25 mg Oral HS   • senna (SENOKOT) tablet 8 6 mg  1 tablet Oral BID   • sertraline (ZOLOFT) tablet 175 mg  175 mg Oral Daily   • warfarin (COUMADIN) tablet 3 mg  3 mg Oral Daily (warfarin)       Imaging:  Reviewed  Physical Exam:    General: Alert and oriented   In no acute distress   CV: Regular rate   Respiratory: Normal effort, nasal cannula in place    Abdominal: Soft, non-tender   Extremities: Bilateral AKA stumps with VAC in place to RLE  Neurologic: Grossly normal, at baseline         Adrien Lowery PA-C  12/20/2022

## 2022-12-20 NOTE — ASSESSMENT & PLAN NOTE
· She has had multiple  blood transfusions during this admission  · Last blood transfusion was on  12/15/2022 prior to her surgery    · Monitor

## 2022-12-20 NOTE — ASSESSMENT & PLAN NOTE
S/p left aka 11/23/2022  · Post-op wound management per vascular surgery  · Await next step in management from vascular surgery, underwent surgical washout of right stump debridement of left stump on 12/19  · Increase pain at stump site  Possible phantom pain  On gabapentin 400mg tid; up-titrate as needed

## 2022-12-21 LAB
ALBUMIN SERPL BCP-MCNC: 1.4 G/DL (ref 3.5–5)
ALP SERPL-CCNC: 165 U/L (ref 46–116)
ALT SERPL W P-5'-P-CCNC: 8 U/L (ref 12–78)
ANION GAP SERPL CALCULATED.3IONS-SCNC: 9 MMOL/L (ref 4–13)
AST SERPL W P-5'-P-CCNC: 23 U/L (ref 5–45)
BACTERIA SPEC ANAEROBE CULT: NORMAL
BASOPHILS # BLD AUTO: 0.01 THOUSANDS/ÂΜL (ref 0–0.1)
BASOPHILS NFR BLD AUTO: 0 % (ref 0–1)
BILIRUB SERPL-MCNC: 0.26 MG/DL (ref 0.2–1)
BUN SERPL-MCNC: 10 MG/DL (ref 5–25)
CALCIUM ALBUM COR SERPL-MCNC: 10.1 MG/DL (ref 8.3–10.1)
CALCIUM SERPL-MCNC: 8 MG/DL (ref 8.3–10.1)
CHLORIDE SERPL-SCNC: 103 MMOL/L (ref 96–108)
CO2 SERPL-SCNC: 28 MMOL/L (ref 21–32)
CREAT SERPL-MCNC: 0.63 MG/DL (ref 0.6–1.3)
EOSINOPHIL # BLD AUTO: 0.01 THOUSAND/ÂΜL (ref 0–0.61)
EOSINOPHIL NFR BLD AUTO: 0 % (ref 0–6)
ERYTHROCYTE [DISTWIDTH] IN BLOOD BY AUTOMATED COUNT: 16.7 % (ref 11.6–15.1)
GFR SERPL CREATININE-BSD FRML MDRD: 97 ML/MIN/1.73SQ M
GLUCOSE SERPL-MCNC: 104 MG/DL (ref 65–140)
GLUCOSE SERPL-MCNC: 110 MG/DL (ref 65–140)
GLUCOSE SERPL-MCNC: 129 MG/DL (ref 65–140)
GLUCOSE SERPL-MCNC: 146 MG/DL (ref 65–140)
GLUCOSE SERPL-MCNC: 94 MG/DL (ref 65–140)
HCT VFR BLD AUTO: 25.7 % (ref 34.8–46.1)
HGB BLD-MCNC: 7.8 G/DL (ref 11.5–15.4)
IMM GRANULOCYTES # BLD AUTO: 0.01 THOUSAND/UL (ref 0–0.2)
IMM GRANULOCYTES NFR BLD AUTO: 0 % (ref 0–2)
LYMPHOCYTES # BLD AUTO: 0.61 THOUSANDS/ÂΜL (ref 0.6–4.47)
LYMPHOCYTES NFR BLD AUTO: 17 % (ref 14–44)
MAGNESIUM SERPL-MCNC: 1.4 MG/DL (ref 1.6–2.6)
MCH RBC QN AUTO: 26.4 PG (ref 26.8–34.3)
MCHC RBC AUTO-ENTMCNC: 30.4 G/DL (ref 31.4–37.4)
MCV RBC AUTO: 87 FL (ref 82–98)
MONOCYTES # BLD AUTO: 0.48 THOUSAND/ÂΜL (ref 0.17–1.22)
MONOCYTES NFR BLD AUTO: 13 % (ref 4–12)
NEUTROPHILS # BLD AUTO: 2.57 THOUSANDS/ÂΜL (ref 1.85–7.62)
NEUTS SEG NFR BLD AUTO: 70 % (ref 43–75)
NRBC BLD AUTO-RTO: 0 /100 WBCS
PHOSPHATE SERPL-MCNC: 3.9 MG/DL (ref 2.3–4.1)
PLATELET # BLD AUTO: 215 THOUSANDS/UL (ref 149–390)
PMV BLD AUTO: 8.6 FL (ref 8.9–12.7)
POTASSIUM SERPL-SCNC: 3.3 MMOL/L (ref 3.5–5.3)
PROT SERPL-MCNC: 5.9 G/DL (ref 6.4–8.4)
RBC # BLD AUTO: 2.96 MILLION/UL (ref 3.81–5.12)
SODIUM SERPL-SCNC: 140 MMOL/L (ref 135–147)
WBC # BLD AUTO: 3.69 THOUSAND/UL (ref 4.31–10.16)

## 2022-12-21 PROCEDURE — 83735 ASSAY OF MAGNESIUM: CPT | Performed by: INTERNAL MEDICINE

## 2022-12-21 PROCEDURE — 80053 COMPREHEN METABOLIC PANEL: CPT | Performed by: INTERNAL MEDICINE

## 2022-12-21 PROCEDURE — 99232 SBSQ HOSP IP/OBS MODERATE 35: CPT | Performed by: INTERNAL MEDICINE

## 2022-12-21 PROCEDURE — NC001 PR NO CHARGE: Performed by: SURGERY

## 2022-12-21 PROCEDURE — 85025 COMPLETE CBC W/AUTO DIFF WBC: CPT | Performed by: INTERNAL MEDICINE

## 2022-12-21 PROCEDURE — 84100 ASSAY OF PHOSPHORUS: CPT | Performed by: INTERNAL MEDICINE

## 2022-12-21 PROCEDURE — 82948 REAGENT STRIP/BLOOD GLUCOSE: CPT

## 2022-12-21 PROCEDURE — 97110 THERAPEUTIC EXERCISES: CPT

## 2022-12-21 RX ORDER — HYDROMORPHONE HCL/PF 1 MG/ML
1 SYRINGE (ML) INJECTION ONCE
Status: COMPLETED | OUTPATIENT
Start: 2022-12-21 | End: 2022-12-21

## 2022-12-21 RX ORDER — POTASSIUM CHLORIDE 20 MEQ/1
40 TABLET, EXTENDED RELEASE ORAL ONCE
Status: COMPLETED | OUTPATIENT
Start: 2022-12-21 | End: 2022-12-21

## 2022-12-21 RX ADMIN — NYSTATIN: 100000 POWDER TOPICAL at 09:09

## 2022-12-21 RX ADMIN — MAGNESIUM OXIDE TAB 400 MG (241.3 MG ELEMENTAL MG) 400 MG: 400 (241.3 MG) TAB at 17:05

## 2022-12-21 RX ADMIN — METOPROLOL SUCCINATE 25 MG: 25 TABLET, EXTENDED RELEASE ORAL at 09:09

## 2022-12-21 RX ADMIN — CLOPIDOGREL BISULFATE 75 MG: 75 TABLET ORAL at 09:09

## 2022-12-21 RX ADMIN — LISINOPRIL 40 MG: 20 TABLET ORAL at 09:09

## 2022-12-21 RX ADMIN — AMLODIPINE BESYLATE 10 MG: 10 TABLET ORAL at 09:09

## 2022-12-21 RX ADMIN — BUPROPION HYDROCHLORIDE 150 MG: 150 TABLET, FILM COATED, EXTENDED RELEASE ORAL at 09:09

## 2022-12-21 RX ADMIN — OXYCODONE HYDROCHLORIDE 10 MG: 10 TABLET, FILM COATED, EXTENDED RELEASE ORAL at 09:09

## 2022-12-21 RX ADMIN — ACETAMINOPHEN 650 MG: 325 TABLET, FILM COATED ORAL at 13:53

## 2022-12-21 RX ADMIN — MAGNESIUM OXIDE TAB 400 MG (241.3 MG ELEMENTAL MG) 400 MG: 400 (241.3 MG) TAB at 13:53

## 2022-12-21 RX ADMIN — INSULIN GLARGINE 10 UNITS: 100 INJECTION, SOLUTION SUBCUTANEOUS at 21:35

## 2022-12-21 RX ADMIN — SERTRALINE HYDROCHLORIDE 175 MG: 100 TABLET ORAL at 09:09

## 2022-12-21 RX ADMIN — METHOCARBAMOL 1000 MG: 500 TABLET ORAL at 13:53

## 2022-12-21 RX ADMIN — HYDROMORPHONE HYDROCHLORIDE 1 MG: 1 INJECTION, SOLUTION INTRAMUSCULAR; INTRAVENOUS; SUBCUTANEOUS at 14:04

## 2022-12-21 RX ADMIN — GABAPENTIN 400 MG: 400 CAPSULE ORAL at 09:09

## 2022-12-21 RX ADMIN — GABAPENTIN 400 MG: 400 CAPSULE ORAL at 21:35

## 2022-12-21 RX ADMIN — POTASSIUM CHLORIDE 40 MEQ: 1500 TABLET, EXTENDED RELEASE ORAL at 13:53

## 2022-12-21 RX ADMIN — NYSTATIN: 100000 POWDER TOPICAL at 17:05

## 2022-12-21 RX ADMIN — METHOCARBAMOL 1000 MG: 500 TABLET ORAL at 21:35

## 2022-12-21 RX ADMIN — ATORVASTATIN CALCIUM 40 MG: 40 TABLET, FILM COATED ORAL at 15:59

## 2022-12-21 RX ADMIN — QUETIAPINE FUMARATE 25 MG: 25 TABLET ORAL at 21:35

## 2022-12-21 RX ADMIN — METHOCARBAMOL 1000 MG: 500 TABLET ORAL at 06:32

## 2022-12-21 RX ADMIN — GABAPENTIN 400 MG: 400 CAPSULE ORAL at 15:59

## 2022-12-21 RX ADMIN — PANTOPRAZOLE SODIUM 40 MG: 40 TABLET, DELAYED RELEASE ORAL at 06:32

## 2022-12-21 RX ADMIN — WARFARIN SODIUM 3 MG: 3 TABLET ORAL at 17:05

## 2022-12-21 RX ADMIN — OXYCODONE HYDROCHLORIDE 10 MG: 10 TABLET, FILM COATED, EXTENDED RELEASE ORAL at 21:35

## 2022-12-21 RX ADMIN — DOXAZOSIN 4 MG: 4 TABLET ORAL at 21:36

## 2022-12-21 NOTE — PLAN OF CARE
Problem: PHYSICAL THERAPY ADULT  Goal: Performs mobility at highest level of function for planned discharge setting  See evaluation for individualized goals  Description: Treatment/Interventions: Functional transfer training, LE strengthening/ROM, Therapeutic exercise, Endurance training, Patient/family training, Equipment eval/education, Bed mobility, Compensatory technique education, Gait training, Continued evaluation, Spoke to nursing, Spoke to MD, Spoke to case management, OT          See flowsheet documentation for full assessment, interventions and recommendations  Outcome: Not Progressing  Note: Prognosis: Guarded  Problem List: Decreased strength, Impaired balance, Decreased mobility, Impaired sensation, Decreased skin integrity, Pain  Assessment: Katya Macdonald reporting pain in BLE R>L secondary to recent wound care  Did receive pain medication prior to session per RN  Currently requiring max Ax2 for bed mobility  Unable to complete full long sit or transf to EOB today due to pain and weakness  Needed reinforcement of HEP for BLE strengthening  Noted functional decline via am-pac compared to prev session due to pain, weakness, medical status (multiple procedures)  Would continue to benefit from therapy services to facilitate recovery and optimize mobility to improve quality of life  Barriers to Discharge: Inaccessible home environment, Decreased caregiver support  Barriers to Discharge Comments: alone  PT Discharge Recommendation: Post acute rehabilitation services    See flowsheet documentation for full assessment

## 2022-12-21 NOTE — ASSESSMENT & PLAN NOTE
Lab Results   Component Value Date    HGBA1C 9 8 (H) 10/25/2022     Recent Labs     12/20/22  2057 12/21/22  0752 12/21/22  1125 12/21/22  1619   POCGLU 121 104 129 110     Significantly reduced regimen from home due to poor oral intake  Goal sugar < 180  Continue Lantus to 10 units at night  Continue sliding scale insulin

## 2022-12-21 NOTE — PROGRESS NOTES
[address] [facility]  Progress Note - [address] 1960, 65 y o  female MRN: [id number]  Unit/Bed#: E4 -01 Encounter: [id number]  Primary Care Provider: ALEX Cortez   Date and time admitted to hospital: 10/21/2022  7:58 PM    Febrile  Assessment & Plan  Intermittent fever; but currently afebrile x > 48 hours  UA positive, but likely contaminant rather than an actual urinary tract infection  Possibly post op fever  · Appreciate ID recommendations  Monitor off antibiotics  · procal normal  · If she spikes a temp >101 will obtain blood cultures, cxr, and re consult ID       HIT (heparin-induced thrombocytopenia)  Assessment & Plan  · Positive heparin antibody on 11/11/2022  · Completed 5 days of overlap with argatroban and Coumadin   · Continue coumadin for minimum of 4 weeks  · INR therapeutic  · Goal INR 2-3    Diabetic ulcer of heel associated with diabetes mellitus due to underlying condition [facility])  Assessment & Plan  S/p left aka 11/23/2022  · Post-op wound management per vascular surgery  · Await next step in management from vascular surgery, underwent surgical washout of right stump debridement of left stump on 12/19  · Increase pain at stump site  Possible phantom pain  On gabapentin 400mg tid; up-titrate as needed  Acute on chronic anemia  Assessment & Plan  · She has had multiple  blood transfusions during this admission  · Last blood transfusion was on  12/15/2022 prior to her surgery    · Monitor    Type 2 diabetes mellitus with hyperglycemia, with long-term current use of insulin [facility])  Assessment & Plan  Lab Results   Component Value Date    HGBA1C 9 8 (H) 10/25/2022     Recent Labs     12/20/22  2057 12/21/22  0752 12/21/22  1125 12/21/22  1619   POCGLU 121 104 129 110     Significantly reduced regimen from home due to poor oral intake  Goal sugar < 180  Continue Lantus to 10 units at night  Continue sliding scale insulin    Stroke [facility])  Assessment & Plan  · Noted to have change in mental status on 11/13/2022  · CT confirmed moderate right parietal lobe stroke, likely embolic in the setting of HIT with thrombosis  · She was treated with argatroban/coumadin bridge  · Continue coumadin tonight   · INR therapeutic  · Goal INR 2-3    Benign essential hypertension  Assessment & Plan  Continue amlodipine 10 mg daily, doxazosin 4 mg at bedtime, metoprolol 25 mg daily, lisinopril 40 mg daily with hold parameters    Depression, recurrent (HCC)  Assessment & Plan  Evaluated by Psychiatry x 2 on this admission  ·  continue Zoloft 175 mg daily, Wellbutrin 150 mg daily, Seroquel 25 mg at bedtime  * PAD (peripheral artery disease) ([facility] [address] )  Assessment & Plan  · She was initially admitted for nonhealing wound of the left lower extremity, requiring multiple endovascular interventions and ultimately underwent left AKA on 11/23/2022 and right AKA on 12/1/22  · S/p right stump debridement and vac placement 12/16/22  · For additional right stump washout and VAC exchange today- 12/19/2022  per surgery  · Continue pain control  · Eventual short-term rehab placement when medically stable  · Management per primary service      VTE Pharmacologic Prophylaxis:   Pharmacologic: Warfarin (Coumadin)  Mechanical VTE Prophylaxis in Place: No    Patient Centered Rounds: I have performed bedside rounds with nursing staff today  Discussions with Specialists or Other Care Team Provider:     Education and Discussions with Family / Patient: Care plan discussed with patient who voiced understanding and agrees with recommendations  Time Spent for Care: 45 minutes  More than 50% of total time spent on counseling and coordination of care as described above      Current Length of Stay: 60 day(s)    Current Patient Status: Inpatient   Certification Statement: The patient will continue to require additional inpatient hospital stay due to Treatment of bilateral AKA    Discharge Plan: As per primary team    Code Status: Level 1 - Full Code      Subjective:   Patient seen and examined at bedside, overall appears to have improved mood and outlook  Have repleted potassium and magnesium  Will repeat INR tomorrow  Just Coumadin as needed  Await further recommendations  Objective:     Vitals:   Temp (24hrs), Av 2 °F (37 3 °C), Min:97 8 °F (36 6 °C), Max:101 4 °F (38 6 °C)    Temp:  [97 8 °F (36 6 °C)-101 4 °F (38 6 °C)] 97 8 °F (36 6 °C)  HR:  [] 100  Resp:  [20] 20  BP: (158-185)/(71-80) 169/71  SpO2:  [94 %-96 %] 95 %  Body mass index is 27 13 kg/m²  Input and Output Summary (last 24 hours):     No intake or output data in the 24 hours ending 22 9306    Physical Exam:     Physical Exam  Vitals and nursing note reviewed  Constitutional:       General: She is not in acute distress  Appearance: She is well-developed  HENT:      Head: Normocephalic and atraumatic  Eyes:      Conjunctiva/sclera: Conjunctivae normal    Cardiovascular:      Rate and Rhythm: Normal rate and regular rhythm  Heart sounds: No murmur heard  Pulmonary:      Effort: Pulmonary effort is normal  No respiratory distress  Breath sounds: Normal breath sounds  Abdominal:      Palpations: Abdomen is soft  Tenderness: There is no abdominal tenderness  Musculoskeletal:         General: Tenderness and deformity present  No swelling  Cervical back: Neck supple  Comments: Bilateral AKA   Skin:     General: Skin is warm and dry  Neurological:      Mental Status: She is alert     Psychiatric:         Mood and Affect: Mood normal          Behavior: Behavior normal            Additional Data:     Labs:    Results from last 7 days   Lab Units 22  0636   WBC Thousand/uL 3 69*   HEMOGLOBIN g/dL 7 8*   HEMATOCRIT % 25 7*   PLATELETS Thousands/uL 215   NEUTROS PCT % 70   LYMPHS PCT % 17   MONOS PCT % 13*   EOS PCT % 0     Results from last 7 days   Lab Units 22  0636   SODIUM mmol/L 140   POTASSIUM mmol/L 3 3*   CHLORIDE mmol/L 103   CO2 mmol/L 28   BUN mg/dL 10   CREATININE mg/dL 0 63   ANION GAP mmol/L 9   CALCIUM mg/dL 8 0*   ALBUMIN g/dL 1 4*   TOTAL BILIRUBIN mg/dL 0 26   ALK PHOS U/L 165*   ALT U/L 8*   AST U/L 23   GLUCOSE RANDOM mg/dL 94     Results from last 7 days   Lab Units 12/20/22  0606   INR  2 87*     Results from last 7 days   Lab Units 12/21/22  1619 12/21/22  1125 12/21/22  0752 12/20/22  2057 12/20/22  1551 12/20/22  1104 12/20/22  0732 12/19/22  2054 12/19/22  1559 12/19/22  1252 12/19/22  0800 12/18/22 2052   POC GLUCOSE mg/dl 110 129 104 121 170* 90 89 135 136 126 128 147*         Results from last 7 days   Lab Units 12/20/22  0606   PROCALCITONIN ng/ml 0 08           * I Have Reviewed All Lab Data Listed Above  * Additional Pertinent Lab Tests Reviewed:  All Labs Within Last 24 Hours Reviewed    Imaging:    Imaging Reports Reviewed Today Include:   Imaging Personally Reviewed by Myself Includes:      Recent Cultures (last 7 days):     Results from last 7 days   Lab Units 12/19/22  1214   GRAM STAIN RESULT  Rare Polys  No bacteria seen       Last 24 Hours Medication List:   Current Facility-Administered Medications   Medication Dose Route Frequency Provider Last Rate   • acetaminophen  650 mg Oral Q6H PRN Varun Overall, DO     • ALPRAZolam  0 25 mg Oral Daily PRN Varun Overall, DO     • aluminum-magnesium hydroxide-simethicone  30 mL Oral Q4H PRN Varun Overall, DO     • amLODIPine  10 mg Oral Daily Varun Overall, DO     • atorvastatin  40 mg Oral Daily With [address] [address], DO     • buPROPion  150 mg Oral Daily Avrun Overall, DO     • clopidogrel  75 mg Oral Daily Varun Overall, DO     • collagenase   Topical Daily Varun Overall, DO     • doxazosin  4 mg Oral HS Varun Overall, DO     • gabapentin  400 mg Oral TID Varun Overall, DO     • insulin glargine  10 Units Subcutaneous HS Varun Overall, DO     • insulin lispro  1-6 Units Subcutaneous TID AC Merissa End, DO     • labetalol  10 mg Intravenous Q6H PRN Merissa End, DO     • lisinopril  40 mg Oral Daily Merissa End, DO     • magnesium oxide  400 mg Oral BID Ruddy Lopez MD     • methocarbamol  1,000 mg Oral [facility] Merissa End, DO     • metoclopramide  10 mg Intravenous Q6H PRN Merissa End, DO     • metoprolol succinate  25 mg Oral Daily Merissa End, DO     • naloxone  0 04 mg Intravenous Q1MIN PRN Merissa End, DO     • nystatin   Topical BID Merissa End, DO     • ondansetron  4 mg Intravenous Q6H PRN Merissa End, DO     • oxyCODONE  10 mg Oral Q12H [address] Merissa End, DO     • oxyCODONE  2 5 mg Oral Q4H PRN Merissa End, DO      Or   • oxyCODONE  5 mg Oral Q4H PRN Merissa End, DO      Or   • oxyCODONE  7 5 mg Oral Q4H PRN Merissa End, DO     • pantoprazole  40 mg Oral Early Morning Merissa End, DO     • polyethylene glycol  17 g Oral BID Merissa End, DO     • QUEtiapine  25 mg Oral HS Merissa End, DO     • senna  1 tablet Oral BID Merissa End, DO     • sertraline  175 mg Oral Daily Merissa End, DO     • warfarin  3 mg Oral Daily (warfarin) Ruddy Lopez MD          Today, Patient Was Seen By: Dhaval Diallo MD    ** Please Note: Dictation voice to text software may have been used in the creation of this document   **

## 2022-12-21 NOTE — PLAN OF CARE
Problem: Potential for Falls  Goal: Patient will remain free of falls  Description: INTERVENTIONS:  - Educate patient/family on patient safety including physical limitations  - Instruct patient to call for assistance with activity   - Consult OT/PT to assist with strengthening/mobility   - Keep Call bell within reach  - Keep bed low and locked with side rails adjusted as appropriate  - Keep care items and personal belongings within reach  - Initiate and maintain comfort rounds  - Make Fall Risk Sign visible to staff  - Offer Toileting every 2 Hours, in advance of need  - Initiate/Maintain bed  chair alarm  - Obtain necessary fall risk management equipment: bed chair alarm, call bell, gripper sock, walker, bedside commode  - Apply yellow socks and bracelet for high fall risk patients  - Consider moving patient to room near nurses station  Outcome: Progressing     Problem: MOBILITY - ADULT  Goal: Maintain or return to baseline ADL function  Description: INTERVENTIONS:  -  Assess patient's ability to carry out ADLs; assess patient's baseline for ADL function and identify physical deficits which impact ability to perform ADLs (bathing, care of mouth/teeth, toileting, grooming, dressing, etc )  - Assess/evaluate cause of self-care deficits   - Assess range of motion  - Assess patient's mobility; develop plan if impaired  - Assess patient's need for assistive devices and provide as appropriate  - Encourage maximum independence but intervene and supervise when necessary  - Involve family in performance of ADLs  - Assess for home care needs following discharge   - Consider OT consult to assist with ADL evaluation and planning for discharge  - Provide patient education as appropriate  Outcome: Progressing  Goal: Maintains/Returns to pre admission functional level  Description: INTERVENTIONS:  - Perform BMAT or MOVE assessment daily    - Set and communicate daily mobility goal to care team and patient/family/caregiver     - Collaborate with rehabilitation services on mobility goals if consulted  - Assist patient in repositioning every 2 hours    - Dangle patient 3 times a day  - Stand patient 3 times a day  - Out of bed to chair as tolerated  - Out of bed for meals  - Out of bed for toileting  - Record patient progress and toleration of activity level   Outcome: Progressing     Problem: PAIN - ADULT  Goal: Verbalizes/displays adequate comfort level or baseline comfort level  Description: Interventions:  - Encourage patient to monitor pain and request assistance  - Assess pain using appropriate pain scale  - Administer analgesics based on type and severity of pain and evaluate response  - Implement non-pharmacological measures as appropriate and evaluate response  - Consider cultural and social influences on pain and pain management  - Notify physician/advanced practitioner if interventions unsuccessful or patient reports new pain  Outcome: Progressing     Problem: INFECTION - ADULT  Goal: Absence or prevention of progression during hospitalization  Description: INTERVENTIONS:  - Assess and monitor for signs and symptoms of infection  - Monitor lab/diagnostic results  - Monitor all insertion sites, i e  indwelling lines, tubes, and drains  - Administer medications as ordered  - Instruct and encourage patient and family to use good hand hygiene technique  Outcome: Progressing     Problem: SAFETY ADULT  Goal: Patient will remain free of falls  Description: INTERVENTIONS:  - Educate patient/family on patient safety including physical limitations  - Instruct patient to call for assistance with activity   - Consult OT/PT to assist with strengthening/mobility   - Keep Call bell within reach  - Keep bed low and locked with side rails adjusted as appropriate  - Keep care items and personal belongings within reach  - Initiate and maintain comfort rounds  - Make Fall Risk Sign visible to staff  - Offer Toileting every 2 Hours, in advance of need  - Initiate/Maintain bed  chair alarm  - Obtain necessary fall risk management equipment: bed chair alarm, call bell, gripper sock, walker, bedside commode  - Apply yellow socks and bracelet for high fall risk patients  - Consider moving patient to room near nurses station  Outcome: Progressing  Goal: Maintain or return to baseline ADL function  Description: INTERVENTIONS:  -  Assess patient's ability to carry out ADLs; assess patient's baseline for ADL function and identify physical deficits which impact ability to perform ADLs (bathing, care of mouth/teeth, toileting, grooming, dressing, etc )  - Assess/evaluate cause of self-care deficits   - Assess range of motion  - Assess patient's mobility; develop plan if impaired  - Assess patient's need for assistive devices and provide as appropriate  - Encourage maximum independence but intervene and supervise when necessary  - Involve family in performance of ADLs  - Assess for home care needs following discharge   - Consider OT consult to assist with ADL evaluation and planning for discharge  - Provide patient education as appropriate  Outcome: Progressing  Goal: Maintains/Returns to pre admission functional level  Description: INTERVENTIONS:  - Perform BMAT or MOVE assessment daily    - Set and communicate daily mobility goal to care team and patient/family/caregiver  - Collaborate with rehabilitation services on mobility goals if consulted  - Assist patient in repositioning every 2 hours    - Dangle patient 3 times a day  - Stand patient 3 times a day  - Out of bed to chair as tolerated  - Out of bed for meals  - Out of bed for toileting  - Record patient progress and toleration of activity level   Outcome: Progressing     Problem: DISCHARGE PLANNING  Goal: Discharge to home or other facility with appropriate resources  Description: INTERVENTIONS:  - Identify barriers to discharge w/patient   - Arrange for needed discharge resources and transportation as appropriate  - Identify discharge learning needs  - Refer to Case Management Department for coordinating discharge planning if the patient needs post-hospital services based on physician/advanced practitioner order   Outcome: Progressing     Problem: Knowledge Deficit  Goal: Patient/family/caregiver demonstrates understanding of disease process, treatment plan, medications, and discharge instructions  Description: Complete learning assessment and assess knowledge base    Interventions:  - Provide teaching at level of understanding  - Provide teaching via preferred learning methods  Outcome: Progressing     Problem: METABOLIC, FLUID AND ELECTROLYTES - ADULT  Goal: Electrolytes maintained within normal limits  Description: INTERVENTIONS:  - Monitor labs and assess patient for signs and symptoms of electrolyte imbalances  - Administer electrolyte replacement as ordered  - Monitor response to electrolyte replacements, including repeat lab results as appropriate  - Instruct patient on fluid and nutrition as appropriate  Outcome: Progressing  Goal: Fluid balance maintained  Description: INTERVENTIONS:  - Monitor labs   - Monitor I/O and WT  - Instruct patient on fluid and nutrition as appropriate  - Assess for signs & symptoms of volume excess or deficit  Outcome: Progressing  Goal: Glucose maintained within target range  Description: INTERVENTIONS:  - Monitor Blood Glucose as ordered  - Assess for signs and symptoms of hyperglycemia and hypoglycemia  - Administer ordered medications to maintain glucose within target range  - Assess nutritional intake and initiate nutrition service referral as needed  Outcome: Progressing     Problem: SKIN/TISSUE INTEGRITY - ADULT  Goal: Skin Integrity remains intact(Skin Breakdown Prevention)  Description: Assess:  -Perform Nicanor assessment every shift  -Clean and moisturize skin every shift  -Inspect skin when repositioning, toileting, and assisting with ADLS  -Assess under medical devices   -Assess extremities for adequate circulation and sensation     Bed Management:  -Have minimal linens on bed & keep smooth, unwrinkled  -Change linens as needed when moist or perspiring  -Avoid sitting or lying in one position for more than 2 hours while in bed    Toileting:  -Offer bedside commode  -Assess for incontinence every 2 hours  -Use incontinent care products after each incontinent episode     Activity:  -Mobilize patient 3 times a day  -Encourage or provide ROM exercises   -Turn and reposition patient every 2 Hours  -Use appropriate equipment to lift or move patient in bed  -Instruct/ Assist with weight shifting every 15 minutes when out of bed in chair  -Consider limitation of chair time 1 hour intervals    Skin Care:  -Avoid use of baby powder, tape, friction and shearing, hot water or constrictive clothing  -Relieve pressure over bony prominences using waffle cushion when in chair  -Do not massage red bony areas    Outcome: Progressing  Goal: Incision(s), wounds(s) or drain site(s) healing without S/S of infection  Description: INTERVENTIONS  - Assess and document dressing, incision, wound bed, drain sites and surrounding tissue  - Provide patient and family education  - Perform skin care/dressing changes everyday as ordered  Outcome: Progressing  Goal: Pressure injury heals and does not worsen  Description: Interventions:  - Implement low air loss mattress or specialty surface (Criteria met)  - Apply silicone foam dressing  - Instruct/assist with weight shifting every 15 minutes when in chair   - Limit chair time to 1 hour intervals  - Use special pressure reducing interventions such as waffle cushion when in chair   - Perform passive or active ROM   - Turn and reposition patient & offload bony prominences every 2 hours   - Utilize friction reducing device or surface for transfers   - Consider consults to  interdisciplinary teams such as wound care, PT/OT  - Use incontinent care products after each incontinent episode   - Consider nutrition services referral as needed  Outcome: Progressing     Problem: MUSCULOSKELETAL - ADULT  Goal: Maintain or return mobility to safest level of function  Description: INTERVENTIONS:  - Assess patient's ability to carry out ADLs; assess patient's baseline for ADL function and identify physical deficits which impact ability to perform ADLs (bathing, care of mouth/teeth, toileting, grooming, dressing, etc )  - Assess/evaluate cause of self-care deficits   - Assess range of motion  - Assess patient's mobility  - Assess patient's need for assistive devices and provide as appropriate  - Encourage maximum independence but intervene and supervise when necessary  - Involve family in performance of ADLs  - Assess for home care needs following discharge   - Consider OT consult to assist with ADL evaluation and planning for discharge  - Provide patient education as appropriate  Outcome: Progressing  Goal: Maintain proper alignment of affected body part  Description: INTERVENTIONS:  - Support, maintain and protect limb and body alignment  - Provide patient/ family with appropriate education  Outcome: Progressing     Problem: CARDIOVASCULAR - ADULT  Goal: Maintains optimal cardiac output and hemodynamic stability  Description: INTERVENTIONS:  - Monitor I/O, vital signs and rhythm  - Monitor for S/S and trends of decreased cardiac output  - Administer and titrate ordered vasoactive medications to optimize hemodynamic stability  - Assess quality of pulses, skin color and temperature  - Assess for signs of decreased coronary artery perfusion  - Instruct patient to report change in severity of symptoms  Outcome: Progressing     Problem: RESPIRATORY - ADULT  Goal: Achieves optimal ventilation and oxygenation  Description: INTERVENTIONS:  - Assess for changes in respiratory status  - Assess for changes in mentation and behavior  - Position to facilitate oxygenation and minimize respiratory effort  - Oxygen administered by appropriate delivery if ordered  - Initiate smoking cessation education as indicated  - Encourage broncho-pulmonary hygiene including cough, deep breathe, Incentive Spirometry  - Assess the need for suctioning and aspirate as needed  - Assess and instruct to report SOB or any respiratory difficulty  - Respiratory Therapy support as indicated  Outcome: Progressing     Problem: GENITOURINARY - ADULT  Goal: Maintains or returns to baseline urinary function  Description: INTERVENTIONS:  - Assess urinary function  - Encourage oral fluids to ensure adequate hydration if ordered  - Administer IV fluids as ordered to ensure adequate hydration  - Administer ordered medications as needed  - Offer frequent toileting  - Follow urinary retention protocol if ordered  Outcome: Progressing  Goal: Absence of urinary retention  Description: INTERVENTIONS:  - Assess patient’s ability to void and empty bladder  - Monitor I/O  - Bladder scan as needed  - Discuss with physician/AP medications to alleviate retention as needed  - Discuss catheterization for long term situations as appropriate  Outcome: Progressing     Problem: HEMATOLOGIC - ADULT  Goal: Maintains hematologic stability  Description: INTERVENTIONS  - Assess for signs and symptoms of bleeding or hemorrhage  - Monitor labs  - Administer supportive blood products/factors as ordered and appropriate  Outcome: Progressing     Problem: Prexisting or High Potential for Compromised Skin Integrity  Goal: Skin integrity is maintained or improved  Description: INTERVENTIONS:  - Identify patients at risk for skin breakdown  - Assess and monitor skin integrity  - Assess and monitor nutrition and hydration status  - Monitor labs   - Assess for incontinence   - Turn and reposition patient  - Assist with mobility/ambulation  - Relieve pressure over bony prominences  - Avoid friction and shearing  - Provide appropriate hygiene as needed including keeping skin clean and dry  - Evaluate need for skin moisturizer/barrier cream  - Collaborate with interdisciplinary team   - Patient/family teaching  - Consider wound care consult   Outcome: Progressing     Problem: Nutrition/Hydration-ADULT  Goal: Nutrient/Hydration intake appropriate for improving, restoring or maintaining nutritional needs  Description: Monitor and assess patient's nutrition/hydration status for malnutrition  Collaborate with interdisciplinary team and initiate plan and interventions as ordered  Monitor patient's weight and dietary intake as ordered or per policy  Utilize nutrition screening tool and intervene as necessary  Determine patient's food preferences and provide high-protein, high-caloric foods as appropriate       INTERVENTIONS:  - Monitor oral intake, urinary output, labs, and treatment plans  - Assess nutrition and hydration status and recommend course of action  - Evaluate amount of meals eaten  - Assist patient with eating if necessary   - Allow adequate time for meals  - Recommend/ encourage appropriate diets, oral nutritional supplements, and vitamin/mineral supplements  - Order, calculate, and assess calorie counts as needed  - Recommend, monitor, and adjust tube feedings and TPN/PPN based on assessed needs  - Assess need for intravenous fluids  - Provide specific nutrition/hydration education as appropriate  - Include patient/family/caregiver in decisions related to nutrition  Outcome: Progressing

## 2022-12-21 NOTE — PLAN OF CARE
Problem: Potential for Falls  Goal: Patient will remain free of falls  Description: INTERVENTIONS:  - Educate patient/family on patient safety including physical limitations  - Instruct patient to call for assistance with activity   - Consult OT/PT to assist with strengthening/mobility   - Keep Call bell within reach  - Keep bed low and locked with side rails adjusted as appropriate  - Keep care items and personal belongings within reach  - Initiate and maintain comfort rounds  - Make Fall Risk Sign visible to staff  - Offer Toileting every 2 Hours, in advance of need  - Initiate/Maintain bed  chair alarm  - Obtain necessary fall risk management equipment: bed chair alarm, call bell, gripper sock, walker, bedside commode  - Apply yellow socks and bracelet for high fall risk patients  - Consider moving patient to room near nurses station  Outcome: Progressing     Problem: PAIN - ADULT  Goal: Verbalizes/displays adequate comfort level or baseline comfort level  Description: Interventions:  - Encourage patient to monitor pain and request assistance  - Assess pain using appropriate pain scale  - Administer analgesics based on type and severity of pain and evaluate response  - Implement non-pharmacological measures as appropriate and evaluate response  - Consider cultural and social influences on pain and pain management  - Notify physician/advanced practitioner if interventions unsuccessful or patient reports new pain  Outcome: Progressing     Problem: INFECTION - ADULT  Goal: Absence or prevention of progression during hospitalization  Description: INTERVENTIONS:  - Assess and monitor for signs and symptoms of infection  - Monitor lab/diagnostic results  - Monitor all insertion sites, i e  indwelling lines, tubes, and drains  - Administer medications as ordered  - Instruct and encourage patient and family to use good hand hygiene technique  Outcome: Progressing     Problem: SAFETY ADULT  Goal: Patient will remain free of falls  Description: INTERVENTIONS:  - Educate patient/family on patient safety including physical limitations  - Instruct patient to call for assistance with activity   - Consult OT/PT to assist with strengthening/mobility   - Keep Call bell within reach  - Keep bed low and locked with side rails adjusted as appropriate  - Keep care items and personal belongings within reach  - Initiate and maintain comfort rounds  - Make Fall Risk Sign visible to staff  - Offer Toileting every 2 Hours, in advance of need  - Initiate/Maintain bed  chair alarm  - Obtain necessary fall risk management equipment: bed chair alarm, call bell, gripper sock, walker, bedside commode  - Apply yellow socks and bracelet for high fall risk patients  - Consider moving patient to room near nurses station  Outcome: Progressing     Problem: RESPIRATORY - ADULT  Goal: Achieves optimal ventilation and oxygenation  Description: INTERVENTIONS:  - Assess for changes in respiratory status  - Assess for changes in mentation and behavior  - Position to facilitate oxygenation and minimize respiratory effort  - Oxygen administered by appropriate delivery if ordered  - Initiate smoking cessation education as indicated  - Encourage broncho-pulmonary hygiene including cough, deep breathe, Incentive Spirometry  - Assess the need for suctioning and aspirate as needed  - Assess and instruct to report SOB or any respiratory difficulty  - Respiratory Therapy support as indicated  Outcome: Progressing     Problem: Nutrition/Hydration-ADULT  Goal: Nutrient/Hydration intake appropriate for improving, restoring or maintaining nutritional needs  Description: Monitor and assess patient's nutrition/hydration status for malnutrition  Collaborate with interdisciplinary team and initiate plan and interventions as ordered  Monitor patient's weight and dietary intake as ordered or per policy  Utilize nutrition screening tool and intervene as necessary   Determine patient's food preferences and provide high-protein, high-caloric foods as appropriate       INTERVENTIONS:  - Monitor oral intake, urinary output, labs, and treatment plans  - Assess nutrition and hydration status and recommend course of action  - Evaluate amount of meals eaten  - Assist patient with eating if necessary   - Allow adequate time for meals  - Recommend/ encourage appropriate diets, oral nutritional supplements, and vitamin/mineral supplements  - Order, calculate, and assess calorie counts as needed  - Recommend, monitor, and adjust tube feedings and TPN/PPN based on assessed needs  - Assess need for intravenous fluids  - Provide specific nutrition/hydration education as appropriate  - Include patient/family/caregiver in decisions related to nutrition  Outcome: Progressing

## 2022-12-21 NOTE — PHYSICAL THERAPY NOTE
PHYSICAL THERAPY NOTE          Patient Name: Amandeep Ro  WCNCD'I Date: 12/21/2022  Time: 5615-1819 12/21/22 1517   PT Last Visit   PT Visit Date 12/21/22   Note Type   Note Type Treatment   Pain Assessment   Pain Assessment Tool 0-10   Pain Score 9   Pain Location/Orientation Orientation: Right;Location: Leg   Hospital Pain Intervention(s) Repositioned; Ambulation/increased activity; Emotional support; Rest   Pain Rating: FLACC (Rest) - Face 0   Pain Rating: FLACC (Rest) - Legs 0   Pain Rating: FLACC (Rest) - Activity 0   Pain Rating: FLACC (Rest) - Cry 0   Pain Rating: FLACC (Rest) - Consolability 0   Score: FLACC (Rest) 0   Pain Rating: FLACC (Activity) - Face 1   Pain Rating: FLACC (Activity) - Legs 0   Pain Rating: FLACC (Activity) - Activity 0   Pain Rating: FLACC (Activity) - Cry 1   Pain Rating: FLACC (Activity) - Consolability 0   Score: FLACC (Activity) 2   Restrictions/Precautions   Weight Bearing Precautions Per Order Yes   RLE Weight Bearing Per Order NWB   LLE Weight Bearing Per Order NWB   Other Precautions Multiple lines; Fall Risk;Pain;O2  (2L, wound vac R)   General   Chart Reviewed Yes   Additional Pertinent History L AKA 11/23 w washout 12/19, R AKA 12/1 w vac placement 12/16 and vac change 12/19   Response to Previous Treatment Patient with no complaints from previous session  Cognition   Overall Cognitive Status WFL   Arousal/Participation Cooperative   Orientation Level Oriented X4   Following Commands Follows one step commands without difficulty   Subjective   Subjective "today is a bad day, they just changed my wound vac and it hurts" "thanks for taking it easy on me today"   Bed Mobility   Rolling L 2  Maximal assistance   Additional items Assist x 1;Assist x 2;Bedrails; Increased time required;Verbal cues   Additional Comments max Ax2 to long sit in bed w bl bedrails   Activity Tolerance   Activity Tolerance Patient limited by pain;Treatment limited secondary to medical complications (Comment)  (weakness)   Nurse Made Aware Elaine RN   Exercises   Glute Sets Supine;15 reps;Bilateral   Hip Flexion Supine;10 reps;Bilateral   Hip Abduction   (pt w difficulty performing)   Hip Extension Supine;10 reps;Bilateral   Hip Adduction   (pt w difficulty performing)   Assessment   Prognosis Guarded   Problem List Decreased strength; Impaired balance;Decreased mobility; Impaired sensation;Decreased skin integrity;Pain   Assessment Lucie Grove reporting pain in BLE R>L secondary to recent wound care  Did receive pain medication prior to session per RN  Currently requiring max Ax2 for bed mobility  Unable to complete full long sit or transf to EOB today due to pain and weakness  Needed reinforcement of HEP for BLE strengthening  Noted functional decline via am-pac compared to prev session due to pain, weakness, medical status (multiple procedures)  Would continue to benefit from therapy services to facilitate recovery and optimize mobility to improve quality of life  Barriers to Discharge Inaccessible home environment;Decreased caregiver support   Goals   Patient Goals reduce pain, feel better   STG Expiration Date 12/23/22   Short Term Goal #1 1)  Pt will perform bed mobility with min A demonstrating appropriate technique 100% of the time in order to improve function  2)  Improve overall strength and balance 1/2 grade in order to optimize ability to perform functional tasks and reduce fall risk  3)Increase activity tolerance to 45 minutes  4) PT for ongoing patient and family/caregiver education, DME needs and d/c planning in order to promote highest level of function in least restrictive environment   PT Treatment Day 17   Plan   Treatment/Interventions LE strengthening/ROM; Functional transfer training; Therapeutic exercise;Patient/family training;Equipment eval/education; Bed mobility; Compensatory technique education;Continued evaluation;Spoke to nursing   Progress Slow progress, decreased activity tolerance   PT Frequency 3-5x/wk   Recommendation   PT Discharge Recommendation Post acute rehabilitation services   Equipment Recommended   (andree bhat)   AM-PAC Basic Mobility Inpatient   Turning in Bed Without Bedrails 1   Lying on Back to Sitting on Edge of Flat Bed 1   Moving Bed to Chair 1   Standing Up From Chair 1   Walk in Room 1   Climb 3-5 Stairs 1   Basic Mobility Inpatient Raw Score 6   Highest Level Of Mobility   JH-HLM Goal 2: Bed activities/Dependent transfer   -HLM Achieved 2: Bed activities/Dependent transfer   Education   Education Provided Home exercise program;Mobility training   Patient Reinforcement needed   End of Consult   Patient Position at End of Consult Supine;Bed/Chair alarm activated; All needs within reach       Tomer Evans, PT

## 2022-12-21 NOTE — PROGRESS NOTES
[address] [facility]  Progress Note - [address] [facility] [address] 1960, 65 y o  female MRN: [id number]  Unit/Bed#: E4 -01 Encounter: [id number]  Primary Care Provider: ALEX Galarza   Date and time admitted to hospital: 10/21/2022  7:58 PM       * PAD (peripheral artery disease) ([facility] [address] )  Assessment & Plan  65year old female former smoker w/HTN, HLD, uncontrolled type II DM (A1c 9 8), hx CVA, presenting with nonhealing extensive L heel ulceration and R hallux and 5th digit ulceration  Vascular surgery consulted for input      S/p multiple b/l endovascular interventions  JUSTIN  R hemispheric CVA     S/p L AKA 11/23/22 [facility])  S/p R AKA, wound debridement 12/1/22 ([facility])  S/p R AKA wound/stump washout and VAC placement 12/16/22 [facility])  S/p R AKA wound/stump VAC change, L AKA stump washout/packing 12/19/22 ([facility]ino)     Hgb 8 3  WBC 5 37  sCr/eGFR 0 72/90     Recommendations:  - Bilateral tissue loss in the setting of uncontrolled type II DM and NYDIA on admission, now s/p multiple angiograms w/intervention    - Endovascular interventions, c/b atheroembolic event to the RLE and JUSTIN with R hemispheric CVA  - Now s/p L AKA on 11/23/22 and R AKA 12/1/22 w/ proximal thigh wound debridements    - R AKA stump: Vac MWF  - L AKA Stump I&D: packing change daily   - Repeat Blood cultures for fever overnight   - Incentive spirometer   - Bedside dressing change tomorrow  - Medical management with statin, Plavix  - Continue coumadin  - Medical management and glucose control per SLIM  - Continue oral pain regimen, adjusted to long acting which appears to be controlling patient's pain well   - Appreciate psych input and management - addition of wellbutrin and adjustment of zoloft   - Will d/w Dr Chris Peña    Subjective: Patient seen and examined  Stump pain is controlled  Reports chills, noted to be febrile overnight  Denies cough, SOB, abdominal pain or other symptoms  Right aka stump changed today  Good granulation tissue  Tolerated well at bedside  Left aka stump packing changed  Tolerated well    Vitals:  /80 (BP Location: Left arm)   Pulse 96   Temp 98 2 °F (36 8 °C) (Temporal)   Resp 20   Ht 5' 4" (1 626 m)   Wt 71 7 kg (158 lb 1 1 oz)   SpO2 96%   BMI 27 13 kg/m²     I/Os:  I/O last 3 completed shifts:  In: -   Out: 800 [Urine:800]  No intake/output data recorded  Lab Results and Cultures:   CBC with diff:   Lab Results   Component Value Date    WBC 3 69 (L) 12/21/2022    HGB 7 8 (L) 12/21/2022    HCT 25 7 (L) 12/21/2022    MCV 87 12/21/2022     12/21/2022    MCH 26 4 (L) 12/21/2022    MCHC 30 4 (L) 12/21/2022    RDW 16 7 (H) 12/21/2022    MPV 8 6 (L) 12/21/2022    NRBC 0 12/21/2022   ,   BMP/CMP:  Lab Results   Component Value Date    SODIUM 140 12/21/2022    K 3 3 (L) 12/21/2022     12/21/2022    CO2 28 12/21/2022    BUN 10 12/21/2022    CREATININE 0 63 12/21/2022    CALCIUM 8 0 (L) 12/21/2022    AST 23 12/21/2022    ALT 8 (L) 12/21/2022    ALKPHOS 165 (H) 12/21/2022    EGFR 97 12/21/2022   ,   Lipid Panel: No results found for: CHOL,   Coags:   Lab Results   Component Value Date    PTT 95 (H) 12/12/2022    INR 2 87 (H) 12/20/2022   ,     Blood Culture:   Lab Results   Component Value Date    BLOODCX No Growth After 5 Days   11/13/2022    BLOODCX Staphylococcus coagulase negative (A) 11/13/2022   ,   Urinalysis:   Lab Results   Component Value Date    COLORU Yellow 11/12/2022    CLARITYU Slightly Cloudy 11/12/2022    SPECGRAV 1 025 11/12/2022    PHUR 5 5 11/12/2022    PHUR 6 5 02/23/2018    LEUKOCYTESUR Trace (A) 11/12/2022    NITRITE Positive (A) 11/12/2022    GLUCOSEU Negative 11/12/2022    KETONESU Negative 11/12/2022    BILIRUBINUR Negative 11/12/2022    BLOODU Large (A) 11/12/2022   ,   Urine Culture:   Lab Results   Component Value Date    URINECX >100,000 cfu/ml Enterobacter cloacae complex (A) 11/12/2022    URINECX 70,000-79,000 cfu/ml Kluyveromyces marxianus (A) 11/12/2022   ,   Wound Culure: No results found for: WOUNDCULT    Medications:  Current Facility-Administered Medications   Medication Dose Route Frequency   • acetaminophen (TYLENOL) tablet 650 mg  650 mg Oral Q6H PRN   • ALPRAZolam (XANAX) tablet 0 25 mg  0 25 mg Oral Daily PRN   • aluminum-magnesium hydroxide-simethicone (MYLANTA) oral suspension 30 mL  30 mL Oral Q4H PRN   • amLODIPine (NORVASC) tablet 10 mg  10 mg Oral Daily   • atorvastatin (LIPITOR) tablet 40 mg  40 mg Oral Daily With Dinner   • buPROPion (WELLBUTRIN XL) 24 hr tablet 150 mg  150 mg Oral Daily   • clopidogrel (PLAVIX) tablet 75 mg  75 mg Oral Daily   • collagenase (SANTYL) ointment   Topical Daily   • doxazosin (CARDURA) tablet 4 mg  4 mg Oral HS   • gabapentin (NEURONTIN) capsule 400 mg  400 mg Oral TID   • insulin glargine (LANTUS) subcutaneous injection 10 Units 0 1 mL  10 Units Subcutaneous HS   • insulin lispro (HumaLOG) 100 units/mL subcutaneous injection 1-6 Units  1-6 Units Subcutaneous TID AC   • labetalol (NORMODYNE) injection 10 mg  10 mg Intravenous Q6H PRN   • lisinopril (ZESTRIL) tablet 40 mg  40 mg Oral Daily   • magnesium oxide (MAG-OX) tablet 400 mg  400 mg Oral BID   • methocarbamol (ROBAXIN) tablet 1,000 mg  1,000 mg Oral Q8H [address]   • metoclopramide (REGLAN) injection 10 mg  10 mg Intravenous Q6H PRN   • metoprolol succinate (TOPROL-XL) 24 hr tablet 25 mg  25 mg Oral Daily   • naloxone (NARCAN) 0 04 mg/mL syringe 0 04 mg  0 04 mg Intravenous Q1MIN PRN   • nystatin (MYCOSTATIN) powder   Topical BID   • ondansetron (ZOFRAN) injection 4 mg  4 mg Intravenous Q6H PRN   • oxyCODONE (OxyCONTIN) 12 hr tablet 10 mg  10 mg Oral Q12H MICHELLE   • oxyCODONE (ROXICODONE) IR tablet 2 5 mg  2 5 mg Oral Q4H PRN    Or   • oxyCODONE (ROXICODONE) IR tablet 5 mg  5 mg Oral Q4H PRN    Or   • oxyCODONE (ROXICODONE) IR tablet 7 5 mg  7 5 mg Oral Q4H PRN   • pantoprazole (PROTONIX) EC tablet 40 mg  40 mg Oral Early Morning   • polyethylene glycol (MIRALAX) packet 17 g  17 g Oral BID   • QUEtiapine (SEROquel) tablet 25 mg  25 mg Oral HS   • senna (SENOKOT) tablet 8 6 mg  1 tablet Oral BID   • sertraline (ZOLOFT) tablet 175 mg  175 mg Oral Daily   • warfarin (COUMADIN) tablet 3 mg  3 mg Oral Daily (warfarin)       Imaging:  Reviewed  Physical Exam:    General: Alert and oriented   In no acute distress   CV: Regular rate   Respiratory: Normal effort, nasal cannula in place    Abdominal: Soft, non-tender   Extremities: Bilateral AKA stumps with VAC in place to RLE  Neurologic: Grossly normal, at baseline         Danny Sierra DO  12/21/2022

## 2022-12-22 LAB
ALBUMIN SERPL BCP-MCNC: 1.4 G/DL (ref 3.5–5)
ALP SERPL-CCNC: 167 U/L (ref 46–116)
ALT SERPL W P-5'-P-CCNC: 13 U/L (ref 12–78)
ANION GAP SERPL CALCULATED.3IONS-SCNC: 8 MMOL/L (ref 4–13)
AST SERPL W P-5'-P-CCNC: 27 U/L (ref 5–45)
BASOPHILS # BLD AUTO: 0.01 THOUSANDS/ÂΜL (ref 0–0.1)
BASOPHILS NFR BLD AUTO: 0 % (ref 0–1)
BILIRUB SERPL-MCNC: 0.27 MG/DL (ref 0.2–1)
BUN SERPL-MCNC: 10 MG/DL (ref 5–25)
CALCIUM ALBUM COR SERPL-MCNC: 9.7 MG/DL (ref 8.3–10.1)
CALCIUM SERPL-MCNC: 7.6 MG/DL (ref 8.3–10.1)
CHLORIDE SERPL-SCNC: 103 MMOL/L (ref 96–108)
CO2 SERPL-SCNC: 28 MMOL/L (ref 21–32)
CREAT SERPL-MCNC: 0.65 MG/DL (ref 0.6–1.3)
EOSINOPHIL # BLD AUTO: 0.07 THOUSAND/ÂΜL (ref 0–0.61)
EOSINOPHIL NFR BLD AUTO: 2 % (ref 0–6)
ERYTHROCYTE [DISTWIDTH] IN BLOOD BY AUTOMATED COUNT: 16.5 % (ref 11.6–15.1)
GFR SERPL CREATININE-BSD FRML MDRD: 96 ML/MIN/1.73SQ M
GLUCOSE SERPL-MCNC: 103 MG/DL (ref 65–140)
GLUCOSE SERPL-MCNC: 105 MG/DL (ref 65–140)
GLUCOSE SERPL-MCNC: 109 MG/DL (ref 65–140)
GLUCOSE SERPL-MCNC: 121 MG/DL (ref 65–140)
GLUCOSE SERPL-MCNC: 181 MG/DL (ref 65–140)
HCT VFR BLD AUTO: 24.3 % (ref 34.8–46.1)
HEMOCCULT STL QL: NEGATIVE
HGB BLD-MCNC: 7.3 G/DL (ref 11.5–15.4)
IMM GRANULOCYTES # BLD AUTO: 0.02 THOUSAND/UL (ref 0–0.2)
IMM GRANULOCYTES NFR BLD AUTO: 1 % (ref 0–2)
INR PPP: 2.56 (ref 0.84–1.19)
LYMPHOCYTES # BLD AUTO: 0.76 THOUSANDS/ÂΜL (ref 0.6–4.47)
LYMPHOCYTES NFR BLD AUTO: 20 % (ref 14–44)
MAGNESIUM SERPL-MCNC: 1.4 MG/DL (ref 1.6–2.6)
MCH RBC QN AUTO: 26 PG (ref 26.8–34.3)
MCHC RBC AUTO-ENTMCNC: 30 G/DL (ref 31.4–37.4)
MCV RBC AUTO: 87 FL (ref 82–98)
MONOCYTES # BLD AUTO: 0.37 THOUSAND/ÂΜL (ref 0.17–1.22)
MONOCYTES NFR BLD AUTO: 10 % (ref 4–12)
NEUTROPHILS # BLD AUTO: 2.62 THOUSANDS/ÂΜL (ref 1.85–7.62)
NEUTS SEG NFR BLD AUTO: 67 % (ref 43–75)
NRBC BLD AUTO-RTO: 0 /100 WBCS
PHOSPHATE SERPL-MCNC: 2.8 MG/DL (ref 2.3–4.1)
PLATELET # BLD AUTO: 214 THOUSANDS/UL (ref 149–390)
PMV BLD AUTO: 8.6 FL (ref 8.9–12.7)
POTASSIUM SERPL-SCNC: 3.6 MMOL/L (ref 3.5–5.3)
PROT SERPL-MCNC: 5.7 G/DL (ref 6.4–8.4)
PROTHROMBIN TIME: 27.3 SECONDS (ref 11.6–14.5)
RBC # BLD AUTO: 2.81 MILLION/UL (ref 3.81–5.12)
SODIUM SERPL-SCNC: 139 MMOL/L (ref 135–147)
WBC # BLD AUTO: 3.85 THOUSAND/UL (ref 4.31–10.16)

## 2022-12-22 PROCEDURE — 99232 SBSQ HOSP IP/OBS MODERATE 35: CPT | Performed by: INTERNAL MEDICINE

## 2022-12-22 PROCEDURE — 82948 REAGENT STRIP/BLOOD GLUCOSE: CPT

## 2022-12-22 PROCEDURE — 83735 ASSAY OF MAGNESIUM: CPT | Performed by: INTERNAL MEDICINE

## 2022-12-22 PROCEDURE — 85025 COMPLETE CBC W/AUTO DIFF WBC: CPT | Performed by: INTERNAL MEDICINE

## 2022-12-22 PROCEDURE — 85610 PROTHROMBIN TIME: CPT | Performed by: INTERNAL MEDICINE

## 2022-12-22 PROCEDURE — 82272 OCCULT BLD FECES 1-3 TESTS: CPT | Performed by: INTERNAL MEDICINE

## 2022-12-22 PROCEDURE — 84100 ASSAY OF PHOSPHORUS: CPT | Performed by: INTERNAL MEDICINE

## 2022-12-22 PROCEDURE — 99233 SBSQ HOSP IP/OBS HIGH 50: CPT | Performed by: INTERNAL MEDICINE

## 2022-12-22 PROCEDURE — 99024 POSTOP FOLLOW-UP VISIT: CPT | Performed by: SURGERY

## 2022-12-22 PROCEDURE — 80053 COMPREHEN METABOLIC PANEL: CPT | Performed by: INTERNAL MEDICINE

## 2022-12-22 RX ORDER — MAGNESIUM SULFATE HEPTAHYDRATE 40 MG/ML
2 INJECTION, SOLUTION INTRAVENOUS ONCE
Status: COMPLETED | OUTPATIENT
Start: 2022-12-22 | End: 2022-12-22

## 2022-12-22 RX ORDER — FERROUS SULFATE 325(65) MG
325 TABLET ORAL
Status: DISCONTINUED | OUTPATIENT
Start: 2022-12-22 | End: 2022-12-23

## 2022-12-22 RX ADMIN — OXYCODONE HYDROCHLORIDE 10 MG: 10 TABLET, FILM COATED, EXTENDED RELEASE ORAL at 21:33

## 2022-12-22 RX ADMIN — INSULIN GLARGINE 10 UNITS: 100 INJECTION, SOLUTION SUBCUTANEOUS at 21:33

## 2022-12-22 RX ADMIN — AMLODIPINE BESYLATE 10 MG: 10 TABLET ORAL at 10:37

## 2022-12-22 RX ADMIN — WARFARIN SODIUM 3 MG: 3 TABLET ORAL at 17:08

## 2022-12-22 RX ADMIN — MAGNESIUM OXIDE TAB 400 MG (241.3 MG ELEMENTAL MG) 400 MG: 400 (241.3 MG) TAB at 17:08

## 2022-12-22 RX ADMIN — PANTOPRAZOLE SODIUM 40 MG: 40 TABLET, DELAYED RELEASE ORAL at 05:32

## 2022-12-22 RX ADMIN — COLLAGENASE SANTYL: 250 OINTMENT TOPICAL at 10:39

## 2022-12-22 RX ADMIN — METHOCARBAMOL 1000 MG: 500 TABLET ORAL at 21:33

## 2022-12-22 RX ADMIN — METOPROLOL SUCCINATE 25 MG: 25 TABLET, EXTENDED RELEASE ORAL at 10:38

## 2022-12-22 RX ADMIN — OXYCODONE HYDROCHLORIDE 10 MG: 10 TABLET, FILM COATED, EXTENDED RELEASE ORAL at 10:37

## 2022-12-22 RX ADMIN — BUPROPION HYDROCHLORIDE 150 MG: 150 TABLET, FILM COATED, EXTENDED RELEASE ORAL at 10:36

## 2022-12-22 RX ADMIN — MAGNESIUM OXIDE TAB 400 MG (241.3 MG ELEMENTAL MG) 400 MG: 400 (241.3 MG) TAB at 10:36

## 2022-12-22 RX ADMIN — LISINOPRIL 40 MG: 20 TABLET ORAL at 10:36

## 2022-12-22 RX ADMIN — OXYCODONE 5 MG: 5 TABLET ORAL at 17:08

## 2022-12-22 RX ADMIN — CEFEPIME HYDROCHLORIDE 2000 MG: 2 INJECTION, POWDER, FOR SOLUTION INTRAVENOUS at 14:00

## 2022-12-22 RX ADMIN — NYSTATIN: 100000 POWDER TOPICAL at 17:22

## 2022-12-22 RX ADMIN — SERTRALINE HYDROCHLORIDE 175 MG: 100 TABLET ORAL at 10:36

## 2022-12-22 RX ADMIN — METHOCARBAMOL 1000 MG: 500 TABLET ORAL at 05:32

## 2022-12-22 RX ADMIN — GABAPENTIN 400 MG: 400 CAPSULE ORAL at 21:33

## 2022-12-22 RX ADMIN — MAGNESIUM SULFATE HEPTAHYDRATE 2 G: 40 INJECTION, SOLUTION INTRAVENOUS at 10:38

## 2022-12-22 RX ADMIN — NYSTATIN: 100000 POWDER TOPICAL at 10:39

## 2022-12-22 RX ADMIN — DOXAZOSIN 4 MG: 4 TABLET ORAL at 21:33

## 2022-12-22 RX ADMIN — FERROUS SULFATE TAB 325 MG (65 MG ELEMENTAL FE) 325 MG: 325 (65 FE) TAB at 10:37

## 2022-12-22 RX ADMIN — GABAPENTIN 400 MG: 400 CAPSULE ORAL at 10:36

## 2022-12-22 RX ADMIN — ATORVASTATIN CALCIUM 40 MG: 40 TABLET, FILM COATED ORAL at 17:09

## 2022-12-22 RX ADMIN — QUETIAPINE FUMARATE 25 MG: 25 TABLET ORAL at 21:34

## 2022-12-22 RX ADMIN — CLOPIDOGREL BISULFATE 75 MG: 75 TABLET ORAL at 10:38

## 2022-12-22 RX ADMIN — METHOCARBAMOL 1000 MG: 500 TABLET ORAL at 14:00

## 2022-12-22 RX ADMIN — GABAPENTIN 400 MG: 400 CAPSULE ORAL at 17:22

## 2022-12-22 NOTE — ASSESSMENT & PLAN NOTE
S/p left aka 11/23/2022  · Post-op wound management per vascular surgery  · Await next step in management from vascular surgery, underwent surgical washout of right stump debridement of left stump on 12/19  · Pain appears well controlled on OxyContin 10 twice daily and gabapentin 400mg tid    · As needed pain medications taken sparingly

## 2022-12-22 NOTE — PROGRESS NOTES
[facility]  Progress Note - [address] [facility] [address] 1960, 65 y o  female MRN: [id number]  Unit/Bed#: E4 -01 Encounter: [id number]  Primary Care Provider: ALEX Palm   Date and time admitted to hospital: 10/21/2022  7:58 PM    * PAD (peripheral artery disease) [facility])  Assessment & Plan  65year old female former smoker w/HTN, HLD, uncontrolled type II DM (A1c 9 8), hx CVA, presenting with nonhealing extensive L heel ulceration and R hallux and 5th digit ulceration  Vascular surgery consulted for input  S/p multiple b/l endovascular interventions  JUSTIN  R hemispheric CVA    S/p L AKA 11/23/22 [facility])  S/p R AKA, wound debridement 12/1/22 [facility])  S/p R AKA wound/stump washout and VAC placement 12/16/22 [facility])  S/p R AKA wound/stump VAC change, L AKA stump washout/packing 12/19/22 ([facility])    Hgb 7 3  WBC 3 85  sCr/eGFR 0 65/96  Wound Cx: enterobacter cloacae     Recommendations:  - Bilateral tissue loss in the setting of uncontrolled type II DM and NYDIA on admission, now s/p multiple angiograms w/intervention    - Endovascular interventions, c/b atheroembolic event to the RLE and JUSTIN with R hemispheric CVA  - Now s/p L AKA on 11/23/22 and R AKA 12/1/22 w/ proximal thigh wound debridements    - R AKA stump with incisional dehiscence and thigh wounds requiring debridement  Taken to the OR on 12/16 for washout and VAC placement with return to the OR yesterday for VAC change   L AKA stump with some drainage, multiple staples removed from the incision and iodoform 1' packing placed in small opening  - Packing to be changed daily by nursing  - Will consult ID for assistance with abx- L stump cultures growing out enterobacter cloacae   - Incentive spirometer   - Bedside dressing change tomorrow   - Medical management with statin, Plavix  - Continue coumadin  - Medical management and glucose control per SLIM  - Continue oral pain regimen, adjusted to long acting which appears to be controlling patient's pain well   - Appreciate psych input and management - addition of wellbutrin and adjustment of zoloft   - Disposition planning  - D/w Dr Jocelyne Giron      Subjective: Patient complains of some stump pain today  Otherwise offers no complaints  Gale hemodynamically stable    Vitals:  /84 (BP Location: Left arm)   Pulse 102   Temp 98 9 °F (37 2 °C) (Temporal)   Resp (!) 106   Ht 5' 4" (1 626 m)   Wt 72 kg (158 lb 11 7 oz)   SpO2 93%   BMI 27 25 kg/m²     I/Os:  I/O last 3 completed shifts: In: 26 [P O :237]  Out: 500 [Urine:500]  No intake/output data recorded  Lab Results and Cultures:   CBC with diff: Lab Results   Component Value Date    WBC 3 85 (L) 12/22/2022    HGB 7 3 (L) 12/22/2022    HCT 24 3 (L) 12/22/2022    MCV 87 12/22/2022     12/22/2022    MCH 26 0 (L) 12/22/2022    MCHC 30 0 (L) 12/22/2022    RDW 16 5 (H) 12/22/2022    MPV 8 6 (L) 12/22/2022    NRBC 0 12/22/2022   ,   BMP/CMP:  Lab Results   Component Value Date    SODIUM 139 12/22/2022    K 3 6 12/22/2022     12/22/2022    CO2 28 12/22/2022    BUN 10 12/22/2022    CREATININE 0 65 12/22/2022    CALCIUM 7 6 (L) 12/22/2022    AST 27 12/22/2022    ALT 13 12/22/2022    ALKPHOS 167 (H) 12/22/2022    EGFR 96 12/22/2022   ,   Lipid Panel: No results found for: CHOL,   Coags:   Lab Results   Component Value Date    PTT 95 (H) 12/12/2022    INR 2 56 (H) 12/22/2022   ,     Blood Culture:   Lab Results   Component Value Date    BLOODCX No Growth After 5 Days   11/13/2022    BLOODCX Staphylococcus coagulase negative (A) 11/13/2022   ,   Urinalysis: Lab Results   Component Value Date    COLORU Yellow 11/12/2022    CLARITYU Slightly Cloudy 11/12/2022    SPECGRAV 1 025 11/12/2022    PHUR 5 5 11/12/2022    PHUR 6 5 02/23/2018    LEUKOCYTESUR Trace (A) 11/12/2022    NITRITE Positive (A) 11/12/2022    GLUCOSEU Negative 11/12/2022    KETONESU Negative 11/12/2022    BILIRUBINUR Negative 11/12/2022    BLOODU Large (A) 11/12/2022   ,   Urine Culture:   Lab Results   Component Value Date    URINECX >100,000 cfu/ml Enterobacter cloacae complex (A) 11/12/2022    URINECX 70,000-79,000 cfu/ml Kluyveromyces marxianus (A) 11/12/2022   ,   Wound Culure: No results found for: WOUNDCULT    Medications:  Current Facility-Administered Medications   Medication Dose Route Frequency   • acetaminophen (TYLENOL) tablet 650 mg  650 mg Oral Q6H PRN   • ALPRAZolam (XANAX) tablet 0 25 mg  0 25 mg Oral Daily PRN   • aluminum-magnesium hydroxide-simethicone (MYLANTA) oral suspension 30 mL  30 mL Oral Q4H PRN   • amLODIPine (NORVASC) tablet 10 mg  10 mg Oral Daily   • atorvastatin (LIPITOR) tablet 40 mg  40 mg Oral Daily With Dinner   • buPROPion (WELLBUTRIN XL) 24 hr tablet 150 mg  150 mg Oral Daily   • clopidogrel (PLAVIX) tablet 75 mg  75 mg Oral Daily   • collagenase (SANTYL) ointment   Topical Daily   • doxazosin (CARDURA) tablet 4 mg  4 mg Oral HS   • ferrous sulfate tablet 325 mg  325 mg Oral Daily With Breakfast   • gabapentin (NEURONTIN) capsule 400 mg  400 mg Oral TID   • insulin glargine (LANTUS) subcutaneous injection 10 Units 0 1 mL  10 Units Subcutaneous HS   • insulin lispro (HumaLOG) 100 units/mL subcutaneous injection 1-6 Units  1-6 Units Subcutaneous TID AC   • labetalol (NORMODYNE) injection 10 mg  10 mg Intravenous Q6H PRN   • lisinopril (ZESTRIL) tablet 40 mg  40 mg Oral Daily   • magnesium oxide (MAG-OX) tablet 400 mg  400 mg Oral BID   • magnesium sulfate 2 g/50 mL IVPB (premix) 2 g  2 g Intravenous Once   • methocarbamol (ROBAXIN) tablet 1,000 mg  1,000 mg Oral Q8H [address]   • metoclopramide (REGLAN) injection 10 mg  10 mg Intravenous Q6H PRN   • metoprolol succinate (TOPROL-XL) 24 hr tablet 25 mg  25 mg Oral Daily   • naloxone (NARCAN) 0 04 mg/mL syringe 0 04 mg  0 04 mg Intravenous Q1MIN PRN   • nystatin (MYCOSTATIN) powder   Topical BID   • ondansetron (ZOFRAN) injection 4 mg  4 mg Intravenous Q6H PRN   • oxyCODONE (OxyCONTIN) 12 hr tablet 10 mg  10 mg Oral Q12H [address]   • oxyCODONE (ROXICODONE) IR tablet 2 5 mg  2 5 mg Oral Q4H PRN    Or   • oxyCODONE (ROXICODONE) IR tablet 5 mg  5 mg Oral Q4H PRN    Or   • oxyCODONE (ROXICODONE) IR tablet 7 5 mg  7 5 mg Oral Q4H PRN   • pantoprazole (PROTONIX) EC tablet 40 mg  40 mg Oral Early Morning   • polyethylene glycol (MIRALAX) packet 17 g  17 g Oral BID   • QUEtiapine (SEROquel) tablet 25 mg  25 mg Oral HS   • senna (SENOKOT) tablet 8 6 mg  1 tablet Oral BID   • sertraline (ZOLOFT) tablet 175 mg  175 mg Oral Daily   • warfarin (COUMADIN) tablet 3 mg  3 mg Oral Daily (warfarin)       Imaging:  Reviewed  Physical Exam:    General: Alert and oriented  CV: Regular rate  Respiratory: Normal effort  Abdominal: Soft, nondistended  Extremities: Status post bilateral above-knee amputations  Wound VAC in place to suction    Neurologic: Grossly normal       Emmanuel Amaya PA-C  12/22/2022

## 2022-12-22 NOTE — ASSESSMENT & PLAN NOTE
Intermittent fever; but currently afebrile x > 48 hours  UA positive, but likely contaminant rather than an actual urinary tract infection  Possibly post op fever     · Continue to have fever; wound cultures from recent wound washout with enterobacteria  · ID recommending 10 days of cefepime twice daily

## 2022-12-22 NOTE — ASSESSMENT & PLAN NOTE
65year old female former smoker w/HTN, HLD, uncontrolled type II DM (A1c 9 8), hx CVA, presenting with nonhealing extensive L heel ulceration and R hallux and 5th digit ulceration  Vascular surgery consulted for input  S/p multiple b/l endovascular interventions  JUSTIN  R hemispheric CVA    S/p L AKA 11/23/22 [facility])  S/p R AKA, wound debridement 12/1/22 [facility])  S/p R AKA wound/stump washout and VAC placement 12/16/22 [facility])  S/p R AKA wound/stump VAC change, L AKA stump washout/packing 12/19/22 ([facility])    Hgb 7 3  WBC 3 85  sCr/eGFR 0 65/96  Wound Cx: enterobacter cloacae     Recommendations:  - Bilateral tissue loss in the setting of uncontrolled type II DM and NYDIA on admission, now s/p multiple angiograms w/intervention    - Endovascular interventions, c/b atheroembolic event to the RLE and JUSTIN with R hemispheric CVA  - Now s/p L AKA on 11/23/22 and R AKA 12/1/22 w/ proximal thigh wound debridements    - R AKA stump with incisional dehiscence and thigh wounds requiring debridement  Taken to the OR on 12/16 for washout and VAC placement with return to the OR yesterday for VAC change   L AKA stump with some drainage, multiple staples removed from the incision and iodoform 1' packing placed in small opening  - Packing to be changed daily by nursing  - Will consult ID for assistance with abx- L stump cultures growing out enterobacter cloacae   - Incentive spirometer   - Bedside dressing change tomorrow   - Medical management with statin, Plavix  - Continue coumadin  - Medical management and glucose control per SLIM  - Continue oral pain regimen, adjusted to long acting which appears to be controlling patient's pain well   - Appreciate psych input and management - addition of wellbutrin and adjustment of zoloft   - Disposition planning  - D/w Dr Contreras Shows

## 2022-12-22 NOTE — PLAN OF CARE
Problem: Potential for Falls  Goal: Patient will remain free of falls  Description: INTERVENTIONS:  - Educate patient/family on patient safety including physical limitations  - Instruct patient to call for assistance with activity   - Consult OT/PT to assist with strengthening/mobility   - Keep Call bell within reach  - Keep bed low and locked with side rails adjusted as appropriate  - Keep care items and personal belongings within reach  - Initiate and maintain comfort rounds  - Make Fall Risk Sign visible to staff  - Offer Toileting every 2 Hours, in advance of need  - Initiate/Maintain bed  chair alarm  - Obtain necessary fall risk management equipment: bed chair alarm, call bell, gripper sock, walker, bedside commode  - Apply yellow socks and bracelet for high fall risk patients  - Consider moving patient to room near nurses station  Outcome: Progressing     Problem: PAIN - ADULT  Goal: Verbalizes/displays adequate comfort level or baseline comfort level  Description: Interventions:  - Encourage patient to monitor pain and request assistance  - Assess pain using appropriate pain scale  - Administer analgesics based on type and severity of pain and evaluate response  - Implement non-pharmacological measures as appropriate and evaluate response  - Consider cultural and social influences on pain and pain management  - Notify physician/advanced practitioner if interventions unsuccessful or patient reports new pain  Outcome: Progressing     Problem: SAFETY ADULT  Goal: Patient will remain free of falls  Description: INTERVENTIONS:  - Educate patient/family on patient safety including physical limitations  - Instruct patient to call for assistance with activity   - Consult OT/PT to assist with strengthening/mobility   - Keep Call bell within reach  - Keep bed low and locked with side rails adjusted as appropriate  - Keep care items and personal belongings within reach  - Initiate and maintain comfort rounds  - Make Fall Risk Sign visible to staff  - Offer Toileting every 2 Hours, in advance of need  - Initiate/Maintain bed  chair alarm  - Obtain necessary fall risk management equipment: bed chair alarm, call bell, gripper sock, walker, bedside commode  - Apply yellow socks and bracelet for high fall risk patients  - Consider moving patient to room near nurses station  Outcome: Progressing     Problem: METABOLIC, FLUID AND ELECTROLYTES - ADULT  Goal: Electrolytes maintained within normal limits  Description: INTERVENTIONS:  - Monitor labs and assess patient for signs and symptoms of electrolyte imbalances  - Administer electrolyte replacement as ordered  - Monitor response to electrolyte replacements, including repeat lab results as appropriate  - Instruct patient on fluid and nutrition as appropriate  Outcome: Progressing  Goal: Fluid balance maintained  Description: INTERVENTIONS:  - Monitor labs   - Monitor I/O and WT  - Instruct patient on fluid and nutrition as appropriate  - Assess for signs & symptoms of volume excess or deficit  Outcome: Progressing  Goal: Glucose maintained within target range  Description: INTERVENTIONS:  - Monitor Blood Glucose as ordered  - Assess for signs and symptoms of hyperglycemia and hypoglycemia  - Administer ordered medications to maintain glucose within target range  - Assess nutritional intake and initiate nutrition service referral as needed  Outcome: Progressing     Problem: SKIN/TISSUE INTEGRITY - ADULT  Goal: Skin Integrity remains intact(Skin Breakdown Prevention)  Description: Assess:  -Perform Nicanor assessment every shift  -Clean and moisturize skin every shift  -Inspect skin when repositioning, toileting, and assisting with ADLS  -Assess under medical devices   -Assess extremities for adequate circulation and sensation     Bed Management:  -Have minimal linens on bed & keep smooth, unwrinkled  -Change linens as needed when moist or perspiring  -Avoid sitting or lying in one position for more than 2 hours while in bed    Toileting:  -Offer bedside commode  -Assess for incontinence every 2 hours  -Use incontinent care products after each incontinent episode     Activity:  -Mobilize patient 3 times a day  -Encourage or provide ROM exercises   -Turn and reposition patient every 2 Hours  -Use appropriate equipment to lift or move patient in bed  -Instruct/ Assist with weight shifting every 15 minutes when out of bed in chair  -Consider limitation of chair time 1 hour intervals    Skin Care:  -Avoid use of baby powder, tape, friction and shearing, hot water or constrictive clothing  -Relieve pressure over bony prominences using waffle cushion when in chair  -Do not massage red bony areas    Outcome: Progressing     Problem: RESPIRATORY - ADULT  Goal: Achieves optimal ventilation and oxygenation  Description: INTERVENTIONS:  - Assess for changes in respiratory status  - Assess for changes in mentation and behavior  - Position to facilitate oxygenation and minimize respiratory effort  - Oxygen administered by appropriate delivery if ordered  - Initiate smoking cessation education as indicated  - Encourage broncho-pulmonary hygiene including cough, deep breathe, Incentive Spirometry  - Assess the need for suctioning and aspirate as needed  - Assess and instruct to report SOB or any respiratory difficulty  - Respiratory Therapy support as indicated  Outcome: Progressing     Problem: HEMATOLOGIC - ADULT  Goal: Maintains hematologic stability  Description: INTERVENTIONS  - Assess for signs and symptoms of bleeding or hemorrhage  - Monitor labs  - Administer supportive blood products/factors as ordered and appropriate  Outcome: Progressing

## 2022-12-22 NOTE — PROGRESS NOTES
Progress Note - [facility]   [address] 65 y o  female MRN: [id number]  Unit/Bed#: E4 -01 Encounter: [id number]      IMPRESSION & RECOMMENDATIONS:   1  Recurring fevers  Patient with recurrent fevers during protracted hospital stay  Numerous etiologies include atheroembolic events, blistering wounds, coagulation issues, stroke, etc  Prior workup so far showed asx bacteriuria in the setting of chronic pa and CoNS bacteremia in 1 of 2 cx  Most recently spiked 101 5*F fever 12/19 after OR wound I&D/debridement, and and again 12/20 101 4*F  Likely secondary to #2     -abx as below   -monitor temperature and hemodynamics  -recheck CBC and BMP in a m  2  Left stump wound infection with abscess  In the setting of #3  S/p L AKA 11/23/22  Post op course complicated by multiple proximal thigh wounds  S/p left stump washout 12/19/22, found to have purulent drainage with abscess cavity approx 2 x 0 3 x 0 5cm  Cx positive for Enterobacter  Discussed with vascular, no evidence of bony involvement, thus anticipate short course of antibiotics, likely 7-10 days  -start IV cefepime 2g q12h  -follow up final wound C&S  -serial exam   -close surgical follow-up     3  Severe PAD with multiple B/L LE atheroembolic events and blistering wounds  Patient initially presented with nonhealing extensive L heel ulceration and R hallux and 5th digit ulceration  Hospital stay complicated by multiple bilateral endovascular interventions  S/p L AKA 11/23 and R AKA 12/1/22 with proximal thigh wound debridements  R stump wound vac in place    -continue follow up with vascular surgery  -daily wound care and dressing changes     4  Coag negative staph bacteremia  Staph epidermidis present in one set of blood cultures drawn on 11/13/2022  Suspect this is a contaminant and not representative of active bacteremia   Patient has had TTE during neurology stroke work up and there was no evidence of valvular vegetation/endocarditis  No indication for repeat blood cultures or additional work up at this time  5  Asymptomatic bacteriuria  Urine culture added onto previous nephrology UA collected on 2022 after patient developed fever on 2022  There was growth of enterobacter  Low suspicion for acute UTI  Jacinto was exchanged  6  R hemispheric CVA  CT head imaging revealing acute R hemispheric stroke on 2022 after patient experienced change in mental status  Neurology was following  Stroke likely embolic in nature      7  Type 2 diabetes mellitus with long term insulin use  HbA1c was 9 8%  Elevated blood glucose is risk factor for wounds and infection  Recommend tight glycemic control  8  Morbid obesity  BMI = 37 88      Antibiotics:  D1 IV cefepime     I have discussed the above management plan in detail with patient  I have discussed the above management plan in detail with patient's RN, and the primary service, vascular surgery  Subjective:  Patient reports subjective chills and night sweats  Denies pain in L stump and states pain is mostly in R stump where wound vac is applied  ID consulted due to new L stump wound abscess  Patient went to OR  at 1210 for right AKA washout and vac change and left AKA debridement  The open wound on the left stump was found to have purulent drainage with abscess cavity approx 2x0 3x0 5cm that was subsequent sent for cx and packed  Following, patient spiked 101 5*F fever  at 2316 and again  101 4*F 2148  Culture resulted positive for Enterobacter cloace and sensi is pending      Objective:  Vitals:  Temp:  [97 8 °F (36 6 °C)-98 9 °F (37 2 °C)] 98 9 °F (37 2 °C)  HR:  [100-102] 102  Resp:  [] 106  BP: (135-169)/(66-84) 165/84  SpO2:  [93 %-97 %] 93 %  Temp (24hrs), Av 2 °F (36 8 °C), Min:97 8 °F (36 6 °C), Max:98 9 °F (37 2 °C)  Current: Temperature: 98 9 °F (37 2 °C)    PHYSICAL EXAM:  General Appearance:  Appearing chronically ill, nontoxic, and in no distress   HEENT: Normocephalic, without obvious abnormality, atraumatic  Conjunctiva pink and sclera anicteric  Oropharynx moist without lesions  Lungs:   Clear to auscultation bilaterally, respirations unlabored   Heart:  RRR; no murmur, rub or gallop   Abdomen:   Soft, non-tender, obese, non-distended, positive bowel sounds    Extremities: No cyanosis, clubbing or edema   Musculoskeletal: Back symmetric without curvature, ROM normal  B/l AKA  : No CVA or suprapubic tenderness  Jacinto patent  Skin: No rashes or lesions  R stump with wound vac apparatus in place with SS drainage in cannister  L stump with dry sterile guaze dressing taken down with serous drainage, erythema at incision site without overt cellulitis  Peripheral IV intact without evidence of erythema, warmth, or exudate  LABS, IMAGING, & OTHER STUDIES:  Lab Results:  I have personally reviewed pertinent labs    Results from last 7 days   Lab Units 12/22/22  0539 12/21/22  0636 12/20/22  0606   WBC Thousand/uL 3 85* 3 69* 5 37   HEMOGLOBIN g/dL 7 3* 7 8* 8 3*   PLATELETS Thousands/uL 214 215 228     Results from last 7 days   Lab Units 12/22/22  0539 12/21/22  0636 12/20/22  0606   SODIUM mmol/L 139 140 140   POTASSIUM mmol/L 3 6 3 3* 3 6   CHLORIDE mmol/L 103 103 103   CO2 mmol/L 28 28 27   BUN mg/dL 10 10 11   CREATININE mg/dL 0 65 0 63 0 72   EGFR ml/min/1 73sq m 96 97 90   CALCIUM mg/dL 7 6* 8 0* 8 2*   AST U/L 27 23 22   ALT U/L 13 8* 10*   ALK PHOS U/L 167* 165* 170*     Results from last 7 days   Lab Units 12/19/22  1214   GRAM STAIN RESULT  Rare Polys  No bacteria seen     Results from last 7 days   Lab Units 12/20/22  0606   PROCALCITONIN ng/ml 0 08

## 2022-12-22 NOTE — ASSESSMENT & PLAN NOTE
· She has had multiple  blood transfusions during this admission  · Last blood transfusion was on  12/15/2022 prior to her surgery    · Monitor  · Hemoglobin continues to decline, FOBT ordered  · Review of recent iron panel appears to show picture of anemia of chronic disease  · Additionally, iron noted to be low, will initiate iron supplementation

## 2022-12-22 NOTE — ASSESSMENT & PLAN NOTE
Lab Results   Component Value Date    HGBA1C 9 8 (H) 10/25/2022     Recent Labs     12/21/22  1125 12/21/22  1619 12/21/22  2100 12/22/22  0729   POCGLU 129 110 146* 105     Significantly reduced regimen from home due to poor oral intake  Goal sugar < 180  Continue Lantus to 10 units at night  Continue sliding scale insulin

## 2022-12-22 NOTE — PROGRESS NOTES
[address] [facility]  Progress Note - Christobal Plan 1960, 65 y o  female MRN: [id number]  Unit/Bed#: E4 -01 Encounter: [id number]  Primary Care Provider: ALEX Ortega   Date and time admitted to hospital: 10/21/2022  7:58 PM    Febrile  Assessment & Plan  Intermittent fever; but currently afebrile x > 48 hours  UA positive, but likely contaminant rather than an actual urinary tract infection  Possibly post op fever  · Continue to have fever; wound cultures from recent wound washout with enterobacteria  · ID recommending 10 days of cefepime twice daily      HIT (heparin-induced thrombocytopenia)  Assessment & Plan  · Positive heparin antibody on 11/11/2022  · Completed 5 days of overlap with argatroban and Coumadin   · Continue coumadin for minimum of 4 weeks  · INR therapeutic  · Goal INR 2-3    Diabetic ulcer of heel associated with diabetes mellitus due to underlying condition [facility])  Assessment & Plan  S/p left aka 11/23/2022  · Post-op wound management per vascular surgery  · Await next step in management from vascular surgery, underwent surgical washout of right stump debridement of left stump on 12/19  · Pain appears well controlled on OxyContin 10 twice daily and gabapentin 400mg tid  · As needed pain medications taken sparingly    Acute on chronic anemia  Assessment & Plan  · She has had multiple  blood transfusions during this admission  · Last blood transfusion was on  12/15/2022 prior to her surgery    · Monitor  · Hemoglobin continues to decline, FOBT ordered  · Review of recent iron panel appears to show picture of anemia of chronic disease  · Additionally, iron noted to be low, will initiate iron supplementation    Type 2 diabetes mellitus with hyperglycemia, with long-term current use of insulin [facility])  Assessment & Plan  Lab Results   Component Value Date    HGBA1C 9 8 (H) 10/25/2022     Recent Labs     12/21/22  1125 12/21/22  1619 12/21/22  2100 12/22/22  0729   POCGLU 129 110 146* 105     Significantly reduced regimen from home due to poor oral intake  Goal sugar < 180  Continue Lantus to 10 units at night  Continue sliding scale insulin    Stroke [facility])  Assessment & Plan  · Noted to have change in mental status on 11/13/2022  · CT confirmed moderate right parietal lobe stroke, likely embolic in the setting of HIT with thrombosis  · She was treated with argatroban/coumadin bridge  · Continue coumadin tonight   · INR therapeutic  · Goal INR 2-3    Benign essential hypertension  Assessment & Plan  Continue amlodipine 10 mg daily, doxazosin 4 mg at bedtime, metoprolol 25 mg daily, lisinopril 40 mg daily with hold parameters    Depression, recurrent (HCC)  Assessment & Plan  Evaluated by Psychiatry x 2 on this admission  ·  continue Zoloft 175 mg daily, Wellbutrin 150 mg daily, Seroquel 25 mg at bedtime  * PAD (peripheral artery disease) ([address] )  Assessment & Plan  · She was initially admitted for nonhealing wound of the left lower extremity, requiring multiple endovascular interventions and ultimately underwent left AKA on 11/23/2022 and right AKA on 12/1/22  · S/p right stump debridement and vac placement 12/16/22  · For additional right stump washout and VAC exchange today- 12/19/2022  per surgery  · Continue pain control  · Eventual short-term rehab placement when medically stable  · Management per primary service        VTE Pharmacologic Prophylaxis:   Pharmacologic: Warfarin (Coumadin)  Mechanical VTE Prophylaxis in Place: No    Patient Centered Rounds: I have performed bedside rounds with nursing staff today  Discussions with Specialists or Other Care Team Provider:     Education and Discussions with Family / Patient: Care plan discussed with patient who voiced understanding and agrees with recommendations  Time Spent for Care: 45 minutes  More than 50% of total time spent on counseling and coordination of care as described above      Current Length of Stay: 64 day(s)    Current Patient Status: Inpatient   Certification Statement: The patient will continue to require additional inpatient hospital stay due to Peripheral vascular disease/AKA    Discharge Plan: As per primary team    Code Status: Level 1 - Full Code      Subjective:   Patient seen and examined bedside, no acute distress or discomfort noted  Continue treatment plan as above; started iron for downtrending anemia  Antibiotics also started for wound cultures revealing Enterobacter tear; ID on board  FOBT pending; magnesium repleted  Objective:     Vitals:   Temp (24hrs), Av 5 °F (36 9 °C), Min:98 °F (36 7 °C), Max:99 °F (37 2 °C)    Temp:  [98 °F (36 7 °C)-99 °F (37 2 °C)] 99 °F (37 2 °C)  HR:  [] 84  Resp:  [] 20  BP: (135-165)/(66-84) 154/76  SpO2:  [93 %-97 %] 95 %  Body mass index is 27 25 kg/m²  Input and Output Summary (last 24 hours): Intake/Output Summary (Last 24 hours) at 2022 1723  Last data filed at 2022 1535  Gross per 24 hour   Intake 387 ml   Output 500 ml   Net -113 ml       Physical Exam:     Physical Exam  Vitals and nursing note reviewed  Constitutional:       General: She is not in acute distress  Appearance: She is well-developed  HENT:      Head: Normocephalic and atraumatic  Eyes:      Conjunctiva/sclera: Conjunctivae normal    Cardiovascular:      Rate and Rhythm: Normal rate and regular rhythm  Heart sounds: No murmur heard  Pulmonary:      Effort: Pulmonary effort is normal  No respiratory distress  Breath sounds: Normal breath sounds  Abdominal:      Palpations: Abdomen is soft  Tenderness: There is no abdominal tenderness  Musculoskeletal:         General: Tenderness and deformity present  No swelling  Cervical back: Neck supple  Comments: Bilateral AKA   Skin:     General: Skin is warm and dry  Neurological:      Mental Status: She is alert     Psychiatric:         Mood and Affect: Mood normal          Behavior: Behavior normal            Additional Data:     Labs:    Results from last 7 days   Lab Units 12/22/22  0539   WBC Thousand/uL 3 85*   HEMOGLOBIN g/dL 7 3*   HEMATOCRIT % 24 3*   PLATELETS Thousands/uL 214   NEUTROS PCT % 67   LYMPHS PCT % 20   MONOS PCT % 10   EOS PCT % 2     Results from last 7 days   Lab Units 12/22/22  0539   SODIUM mmol/L 139   POTASSIUM mmol/L 3 6   CHLORIDE mmol/L 103   CO2 mmol/L 28   BUN mg/dL 10   CREATININE mg/dL 0 65   ANION GAP mmol/L 8   CALCIUM mg/dL 7 6*   ALBUMIN g/dL 1 4*   TOTAL BILIRUBIN mg/dL 0 27   ALK PHOS U/L 167*   ALT U/L 13   AST U/L 27   GLUCOSE RANDOM mg/dL 109     Results from last 7 days   Lab Units 12/22/22  0539   INR  2 56*     Results from last 7 days   Lab Units 12/22/22  1627 12/22/22  1134 12/22/22  0729 12/21/22  2100 12/21/22  1619 12/21/22  1125 12/21/22  0752 12/20/22  2057 12/20/22  1551 12/20/22  1104 12/20/22  0732 12/19/22  2054   POC GLUCOSE mg/dl 121 103 105 146* 110 129 104 121 170* 90 89 135         Results from last 7 days   Lab Units 12/20/22  0606   PROCALCITONIN ng/ml 0 08           * I Have Reviewed All Lab Data Listed Above  * Additional Pertinent Lab Tests Reviewed:  All Labs Within Last 24 Hours Reviewed    Imaging:    Imaging Reports Reviewed Today Include:   Imaging Personally Reviewed by Myself Includes:      Recent Cultures (last 7 days):     Results from last 7 days   Lab Units 12/19/22  1214   GRAM STAIN RESULT  Rare Polys  No bacteria seen       Last 24 Hours Medication List:   Current Facility-Administered Medications   Medication Dose Route Frequency Provider Last Rate   • acetaminophen  650 mg Oral Q6H PRN Spero Gondola, DO     • ALPRAZolam  0 25 mg Oral Daily PRN Spero Gondola, DO     • aluminum-magnesium hydroxide-simethicone  30 mL Oral Q4H PRN Spero Gondola, DO     • amLODIPine  10 mg Oral Daily Spero Gondola, DO     • atorvastatin  40 mg Oral Daily With [employer] [facility] Gokul, DO     • buPROPion  150 mg Oral Daily [facility]     • cefepime  2,000 mg Intravenous Q12H Federico Christianson PA-C Stopped (12/22/22 9381)   • clopidogrel  75 mg Oral Daily [facility], [facility]     • collagenase   Topical Daily [facility], DO     • doxazosin  4 mg Oral HS [facility], [facility]     • ferrous sulfate  325 mg Oral Daily With Breakfast Willis Pretty MD     • gabapentin  400 mg Oral TID [facility], DO     • insulin glargine  10 Units Subcutaneous HS [facility], DO     • insulin lispro  1-6 Units Subcutaneous TID AC [facility], DO     • labetalol  10 mg Intravenous Q6H PRN [facility] Johnny, DO     • lisinopril  40 mg Oral Daily [facility], [facility]     • methocarbamol  1,000 mg Oral [facility], DO     • metoclopramide  10 mg Intravenous Q6H PRN [facility] Johnny, DO     • metoprolol succinate  25 mg Oral Daily [facility], DO     • naloxone  0 04 mg Intravenous Q1MIN PRN [facility] Johnny, DO     • nystatin   Topical BID [facility], DO     • ondansetron  4 mg Intravenous Q6H PRN [facility], DO     • oxyCODONE  10 mg Oral Q12H [facility], DO     • oxyCODONE  2 5 mg Oral Q4H PRN [facility], DO      Or   • oxyCODONE  5 mg Oral Q4H PRN [facility], DO      Or   • oxyCODONE  7 5 mg Oral Q4H PRN [facility], DO     • pantoprazole  40 mg Oral Early Morning [facility], DO     • polyethylene glycol  17 g Oral BID [facility], DO     • QUEtiapine  25 mg Oral HS [facility], DO     • senna  1 tablet Oral BID [facility], DO     • sertraline  175 mg Oral Daily [facility] Johnny, DO     • warfarin  3 mg Oral Daily (warfarin) Willis Pretty MD          Today, Patient Was Seen By: Samuel Tafoya MD    ** Please Note: Dictation voice to text software may have been used in the creation of this document   **

## 2022-12-23 LAB
ANION GAP SERPL CALCULATED.3IONS-SCNC: 10 MMOL/L (ref 4–13)
BACTERIA TISS AEROBE CULT: ABNORMAL
BASOPHILS # BLD AUTO: 0.01 THOUSANDS/ÂΜL (ref 0–0.1)
BASOPHILS NFR BLD AUTO: 0 % (ref 0–1)
BLAIMP ISLT/SPM QL: NOT DETECTED
BLAKPC ISLT/SPM QL: NOT DETECTED
BLAOXA-48 ISLT/SPM QL: NOT DETECTED
BLAVIM ISLT/SPM QL: NOT DETECTED
BUN SERPL-MCNC: 8 MG/DL (ref 5–25)
CALCIUM SERPL-MCNC: 7.9 MG/DL (ref 8.3–10.1)
CHLORIDE SERPL-SCNC: 104 MMOL/L (ref 96–108)
CO2 SERPL-SCNC: 27 MMOL/L (ref 21–32)
CREAT SERPL-MCNC: 0.65 MG/DL (ref 0.6–1.3)
EOSINOPHIL # BLD AUTO: 0.12 THOUSAND/ÂΜL (ref 0–0.61)
EOSINOPHIL NFR BLD AUTO: 3 % (ref 0–6)
ERYTHROCYTE [DISTWIDTH] IN BLOOD BY AUTOMATED COUNT: 16.6 % (ref 11.6–15.1)
GFR SERPL CREATININE-BSD FRML MDRD: 96 ML/MIN/1.73SQ M
GLUCOSE SERPL-MCNC: 104 MG/DL (ref 65–140)
GLUCOSE SERPL-MCNC: 108 MG/DL (ref 65–140)
GLUCOSE SERPL-MCNC: 119 MG/DL (ref 65–140)
GLUCOSE SERPL-MCNC: 124 MG/DL (ref 65–140)
GLUCOSE SERPL-MCNC: 141 MG/DL (ref 65–140)
GLUCOSE SERPL-MCNC: 149 MG/DL (ref 65–140)
GRAM STN SPEC: ABNORMAL
GRAM STN SPEC: ABNORMAL
HCT VFR BLD AUTO: 24.9 % (ref 34.8–46.1)
HGB BLD-MCNC: 7.4 G/DL (ref 11.5–15.4)
IMM GRANULOCYTES # BLD AUTO: 0.03 THOUSAND/UL (ref 0–0.2)
IMM GRANULOCYTES NFR BLD AUTO: 1 % (ref 0–2)
INR PPP: 2.09 (ref 0.84–1.19)
LYMPHOCYTES # BLD AUTO: 0.77 THOUSANDS/ÂΜL (ref 0.6–4.47)
LYMPHOCYTES NFR BLD AUTO: 17 % (ref 14–44)
MCH RBC QN AUTO: 25.5 PG (ref 26.8–34.3)
MCHC RBC AUTO-ENTMCNC: 29.7 G/DL (ref 31.4–37.4)
MCV RBC AUTO: 86 FL (ref 82–98)
MONOCYTES # BLD AUTO: 0.4 THOUSAND/ÂΜL (ref 0.17–1.22)
MONOCYTES NFR BLD AUTO: 9 % (ref 4–12)
NDM CEPHEID: NOT DETECTED
NEUTROPHILS # BLD AUTO: 3.24 THOUSANDS/ÂΜL (ref 1.85–7.62)
NEUTS SEG NFR BLD AUTO: 70 % (ref 43–75)
NRBC BLD AUTO-RTO: 0 /100 WBCS
PLATELET # BLD AUTO: 200 THOUSANDS/UL (ref 149–390)
PMV BLD AUTO: 8.4 FL (ref 8.9–12.7)
POTASSIUM SERPL-SCNC: 3.4 MMOL/L (ref 3.5–5.3)
PROTHROMBIN TIME: 23.4 SECONDS (ref 11.6–14.5)
RBC # BLD AUTO: 2.9 MILLION/UL (ref 3.81–5.12)
SODIUM SERPL-SCNC: 141 MMOL/L (ref 135–147)
WBC # BLD AUTO: 4.57 THOUSAND/UL (ref 4.31–10.16)

## 2022-12-23 PROCEDURE — 80048 BASIC METABOLIC PNL TOTAL CA: CPT | Performed by: INTERNAL MEDICINE

## 2022-12-23 PROCEDURE — 99024 POSTOP FOLLOW-UP VISIT: CPT | Performed by: SURGERY

## 2022-12-23 PROCEDURE — 82948 REAGENT STRIP/BLOOD GLUCOSE: CPT

## 2022-12-23 PROCEDURE — 85610 PROTHROMBIN TIME: CPT | Performed by: INTERNAL MEDICINE

## 2022-12-23 PROCEDURE — 99232 SBSQ HOSP IP/OBS MODERATE 35: CPT | Performed by: INTERNAL MEDICINE

## 2022-12-23 PROCEDURE — 97110 THERAPEUTIC EXERCISES: CPT

## 2022-12-23 PROCEDURE — 2W0NX6Z CHANGE PRESSURE DRESSING ON RIGHT UPPER LEG: ICD-10-PCS | Performed by: SURGERY

## 2022-12-23 PROCEDURE — 85025 COMPLETE CBC W/AUTO DIFF WBC: CPT | Performed by: INTERNAL MEDICINE

## 2022-12-23 PROCEDURE — 99233 SBSQ HOSP IP/OBS HIGH 50: CPT | Performed by: INTERNAL MEDICINE

## 2022-12-23 PROCEDURE — 97535 SELF CARE MNGMENT TRAINING: CPT

## 2022-12-23 RX ORDER — METOPROLOL SUCCINATE 50 MG/1
50 TABLET, EXTENDED RELEASE ORAL DAILY
Status: DISCONTINUED | OUTPATIENT
Start: 2022-12-24 | End: 2022-12-25

## 2022-12-23 RX ORDER — POTASSIUM CHLORIDE 20 MEQ/1
20 TABLET, EXTENDED RELEASE ORAL DAILY
Status: DISCONTINUED | OUTPATIENT
Start: 2022-12-24 | End: 2023-01-12 | Stop reason: HOSPADM

## 2022-12-23 RX ORDER — WARFARIN SODIUM 2 MG/1
4 TABLET ORAL
Status: DISCONTINUED | OUTPATIENT
Start: 2022-12-24 | End: 2022-12-27

## 2022-12-23 RX ORDER — FERROUS SULFATE 325(65) MG
325 TABLET ORAL
Status: DISCONTINUED | OUTPATIENT
Start: 2022-12-24 | End: 2023-01-12 | Stop reason: HOSPADM

## 2022-12-23 RX ORDER — POTASSIUM CHLORIDE 20 MEQ/1
40 TABLET, EXTENDED RELEASE ORAL ONCE
Status: COMPLETED | OUTPATIENT
Start: 2022-12-23 | End: 2022-12-23

## 2022-12-23 RX ORDER — DOXYCYCLINE HYCLATE 100 MG/1
100 CAPSULE ORAL EVERY 12 HOURS SCHEDULED
Status: COMPLETED | OUTPATIENT
Start: 2022-12-23 | End: 2023-01-02

## 2022-12-23 RX ADMIN — OXYCODONE HYDROCHLORIDE 10 MG: 10 TABLET, FILM COATED, EXTENDED RELEASE ORAL at 09:05

## 2022-12-23 RX ADMIN — GABAPENTIN 400 MG: 400 CAPSULE ORAL at 09:05

## 2022-12-23 RX ADMIN — OXYCODONE HYDROCHLORIDE 10 MG: 10 TABLET, FILM COATED, EXTENDED RELEASE ORAL at 21:53

## 2022-12-23 RX ADMIN — INSULIN GLARGINE 10 UNITS: 100 INJECTION, SOLUTION SUBCUTANEOUS at 21:53

## 2022-12-23 RX ADMIN — CLOPIDOGREL BISULFATE 75 MG: 75 TABLET ORAL at 09:06

## 2022-12-23 RX ADMIN — CEFEPIME HYDROCHLORIDE 2000 MG: 2 INJECTION, POWDER, FOR SOLUTION INTRAVENOUS at 00:58

## 2022-12-23 RX ADMIN — GABAPENTIN 400 MG: 400 CAPSULE ORAL at 17:39

## 2022-12-23 RX ADMIN — METHOCARBAMOL 1000 MG: 500 TABLET ORAL at 14:37

## 2022-12-23 RX ADMIN — ATORVASTATIN CALCIUM 40 MG: 40 TABLET, FILM COATED ORAL at 17:39

## 2022-12-23 RX ADMIN — PANTOPRAZOLE SODIUM 40 MG: 40 TABLET, DELAYED RELEASE ORAL at 06:17

## 2022-12-23 RX ADMIN — METHOCARBAMOL 1000 MG: 500 TABLET ORAL at 21:53

## 2022-12-23 RX ADMIN — SENNOSIDES 8.6 MG: 8.6 TABLET, FILM COATED ORAL at 09:05

## 2022-12-23 RX ADMIN — METOPROLOL SUCCINATE 25 MG: 25 TABLET, EXTENDED RELEASE ORAL at 09:05

## 2022-12-23 RX ADMIN — SERTRALINE HYDROCHLORIDE 175 MG: 100 TABLET ORAL at 09:05

## 2022-12-23 RX ADMIN — CEFEPIME HYDROCHLORIDE 2000 MG: 2 INJECTION, POWDER, FOR SOLUTION INTRAVENOUS at 14:37

## 2022-12-23 RX ADMIN — FERROUS SULFATE TAB 325 MG (65 MG ELEMENTAL FE) 325 MG: 325 (65 FE) TAB at 09:13

## 2022-12-23 RX ADMIN — LISINOPRIL 40 MG: 20 TABLET ORAL at 09:05

## 2022-12-23 RX ADMIN — POTASSIUM CHLORIDE 40 MEQ: 1500 TABLET, EXTENDED RELEASE ORAL at 18:50

## 2022-12-23 RX ADMIN — GABAPENTIN 400 MG: 400 CAPSULE ORAL at 21:53

## 2022-12-23 RX ADMIN — BUPROPION HYDROCHLORIDE 150 MG: 150 TABLET, FILM COATED, EXTENDED RELEASE ORAL at 09:05

## 2022-12-23 RX ADMIN — DOXAZOSIN 4 MG: 4 TABLET ORAL at 21:53

## 2022-12-23 RX ADMIN — OXYCODONE 7.5 MG: 5 TABLET ORAL at 13:34

## 2022-12-23 RX ADMIN — WARFARIN SODIUM 3 MG: 3 TABLET ORAL at 17:39

## 2022-12-23 RX ADMIN — QUETIAPINE FUMARATE 25 MG: 25 TABLET ORAL at 21:53

## 2022-12-23 RX ADMIN — AMLODIPINE BESYLATE 10 MG: 10 TABLET ORAL at 09:05

## 2022-12-23 RX ADMIN — DOXYCYCLINE 100 MG: 100 CAPSULE ORAL at 21:53

## 2022-12-23 RX ADMIN — METHOCARBAMOL 1000 MG: 500 TABLET ORAL at 06:17

## 2022-12-23 NOTE — ASSESSMENT & PLAN NOTE
Lab Results   Component Value Date    HGBA1C 9 8 (H) 10/25/2022     Recent Labs     12/22/22 2050 12/23/22  0751 12/23/22  1152 12/23/22  1554   POCGLU 181* 104 124 149*     Significantly reduced regimen from home due to poor oral intake  Goal sugar < 180  Continue Lantus to 10 units at night  Continue sliding scale insulin

## 2022-12-23 NOTE — PLAN OF CARE
Problem: PHYSICAL THERAPY ADULT  Goal: Performs mobility at highest level of function for planned discharge setting  See evaluation for individualized goals  Description: Treatment/Interventions: Functional transfer training, LE strengthening/ROM, Therapeutic exercise, Endurance training, Patient/family training, Equipment eval/education, Bed mobility, Compensatory technique education, Gait training, Continued evaluation, Spoke to nursing, Spoke to MD, Spoke to case management, OT          See flowsheet documentation for full assessment, interventions and recommendations  Outcome: Progressing  Note: Prognosis: Guarded  Problem List: Decreased strength, Impaired balance, Decreased mobility, Impaired sensation, Decreased skin integrity, Pain, Decreased endurance  Assessment: Fernando Brothers was seen for a f/u session and to update POC as appropriate  Pt has made little functional progress since admission however progress in therapy significantly limited by medical complications including multiple surgeries w significant BLE amputations, +CVA, pain  She consistently requires max Ax2 for bed mobility  Unable to tolerate full supine<>sit transf due to BLE R>L pain, weakness (poor trunk control and UE support), deconditioning  Pt require assist to initiate and complete long sit transf however able to hold herself unsupported w use of bl handrails about 15"  Pt will continue to benefit from therapy services to facilitate recovery; if patient w continued limited progress in therapy over the next two weeks may consider transition to restorative program  Will likely require LTC upon d/c given significant functional changes secondary to surgical intervention, environmental and social barriers    Barriers to Discharge: Inaccessible home environment, Decreased caregiver support  Barriers to Discharge Comments: steps, lives alone  PT Discharge Recommendation: Post acute rehabilitation services (will likely require LTC)    See flowsheet documentation for full assessment

## 2022-12-23 NOTE — TREATMENT PLAN
Infectious Diseases Chart Note:  - Reviewed OR cx from 12/19, Enterobacter is resistant to Cefepime  - will instead switch to PO doxycycline 100 mg BID  - doxy may interact with Warfarin, please monitor daily INR while on therapy  - plan to complete 10 day course    Plan and recommendations were discussed with primary team   ID will see again 12/26    Michelle Sahu MD

## 2022-12-23 NOTE — PLAN OF CARE
Problem: Potential for Falls  Goal: Patient will remain free of falls  Description: INTERVENTIONS:  - Educate patient/family on patient safety including physical limitations  - Instruct patient to call for assistance with activity   - Consult OT/PT to assist with strengthening/mobility   - Keep Call bell within reach  - Keep bed low and locked with side rails adjusted as appropriate  - Keep care items and personal belongings within reach  - Initiate and maintain comfort rounds  - Make Fall Risk Sign visible to staff  - Offer Toileting every 2 Hours, in advance of need  - Initiate/Maintain bed  chair alarm  - Obtain necessary fall risk management equipment: bed chair alarm, call bell, gripper sock, walker, bedside commode  - Apply yellow socks and bracelet for high fall risk patients  - Consider moving patient to room near nurses station  Outcome: Progressing     Problem: MOBILITY - ADULT  Goal: Maintain or return to baseline ADL function  Description: INTERVENTIONS:  -  Assess patient's ability to carry out ADLs; assess patient's baseline for ADL function and identify physical deficits which impact ability to perform ADLs (bathing, care of mouth/teeth, toileting, grooming, dressing, etc )  - Assess/evaluate cause of self-care deficits   - Assess range of motion  - Assess patient's mobility; develop plan if impaired  - Assess patient's need for assistive devices and provide as appropriate  - Encourage maximum independence but intervene and supervise when necessary  - Involve family in performance of ADLs  - Assess for home care needs following discharge   - Consider OT consult to assist with ADL evaluation and planning for discharge  - Provide patient education as appropriate  Outcome: Progressing  Goal: Maintains/Returns to pre admission functional level  Description: INTERVENTIONS:  - Perform BMAT or MOVE assessment daily    - Set and communicate daily mobility goal to care team and patient/family/caregiver     - Collaborate with rehabilitation services on mobility goals if consulted  - Assist patient in repositioning every 2 hours    - Dangle patient 3 times a day  - Stand patient 3 times a day  - Out of bed to chair as tolerated  - Out of bed for meals  - Out of bed for toileting  - Record patient progress and toleration of activity level   Outcome: Progressing     Problem: PAIN - ADULT  Goal: Verbalizes/displays adequate comfort level or baseline comfort level  Description: Interventions:  - Encourage patient to monitor pain and request assistance  - Assess pain using appropriate pain scale  - Administer analgesics based on type and severity of pain and evaluate response  - Implement non-pharmacological measures as appropriate and evaluate response  - Consider cultural and social influences on pain and pain management  - Notify physician/advanced practitioner if interventions unsuccessful or patient reports new pain  Outcome: Progressing     Problem: INFECTION - ADULT  Goal: Absence or prevention of progression during hospitalization  Description: INTERVENTIONS:  - Assess and monitor for signs and symptoms of infection  - Monitor lab/diagnostic results  - Monitor all insertion sites, i e  indwelling lines, tubes, and drains  - Administer medications as ordered  - Instruct and encourage patient and family to use good hand hygiene technique  Outcome: Progressing     Problem: DISCHARGE PLANNING  Goal: Discharge to home or other facility with appropriate resources  Description: INTERVENTIONS:  - Identify barriers to discharge w/patient   - Arrange for needed discharge resources and transportation as appropriate  - Identify discharge learning needs  - Refer to Case Management Department for coordinating discharge planning if the patient needs post-hospital services based on physician/advanced practitioner order   Outcome: Progressing     Problem: Knowledge Deficit  Goal: Patient/family/caregiver demonstrates understanding of disease process, treatment plan, medications, and discharge instructions  Description: Complete learning assessment and assess knowledge base    Interventions:  - Provide teaching at level of understanding  - Provide teaching via preferred learning methods  Outcome: Progressing     Problem: METABOLIC, FLUID AND ELECTROLYTES - ADULT  Goal: Electrolytes maintained within normal limits  Description: INTERVENTIONS:  - Monitor labs and assess patient for signs and symptoms of electrolyte imbalances  - Administer electrolyte replacement as ordered  - Monitor response to electrolyte replacements, including repeat lab results as appropriate  - Instruct patient on fluid and nutrition as appropriate  Outcome: Progressing  Goal: Fluid balance maintained  Description: INTERVENTIONS:  - Monitor labs   - Monitor I/O and WT  - Instruct patient on fluid and nutrition as appropriate  - Assess for signs & symptoms of volume excess or deficit  Outcome: Progressing  Goal: Glucose maintained within target range  Description: INTERVENTIONS:  - Monitor Blood Glucose as ordered  - Assess for signs and symptoms of hyperglycemia and hypoglycemia  - Administer ordered medications to maintain glucose within target range  - Assess nutritional intake and initiate nutrition service referral as needed  Outcome: Progressing     Problem: SKIN/TISSUE INTEGRITY - ADULT  Goal: Skin Integrity remains intact(Skin Breakdown Prevention)  Description: Assess:  -Perform Nicanor assessment every shift  -Clean and moisturize skin every shift  -Inspect skin when repositioning, toileting, and assisting with ADLS  -Assess under medical devices   -Assess extremities for adequate circulation and sensation     Bed Management:  -Have minimal linens on bed & keep smooth, unwrinkled  -Change linens as needed when moist or perspiring  -Avoid sitting or lying in one position for more than 2 hours while in bed    Toileting:  -Offer bedside commode  -Assess for incontinence every 2 hours  -Use incontinent care products after each incontinent episode     Activity:  -Mobilize patient 3 times a day  -Encourage or provide ROM exercises   -Turn and reposition patient every 2 Hours  -Use appropriate equipment to lift or move patient in bed  -Instruct/ Assist with weight shifting every 15 minutes when out of bed in chair  -Consider limitation of chair time 1 hour intervals    Skin Care:  -Avoid use of baby powder, tape, friction and shearing, hot water or constrictive clothing  -Relieve pressure over bony prominences using waffle cushion when in chair  -Do not massage red bony areas    Outcome: Progressing  Goal: Incision(s), wounds(s) or drain site(s) healing without S/S of infection  Description: INTERVENTIONS  - Assess and document dressing, incision, wound bed, drain sites and surrounding tissue  - Provide patient and family education  - Perform skin care/dressing changes everyday as ordered  Outcome: Progressing  Goal: Pressure injury heals and does not worsen  Description: Interventions:  - Implement low air loss mattress or specialty surface (Criteria met)  - Apply silicone foam dressing  - Instruct/assist with weight shifting every 15 minutes when in chair   - Limit chair time to 1 hour intervals  - Use special pressure reducing interventions such as waffle cushion when in chair   - Perform passive or active ROM   - Turn and reposition patient & offload bony prominences every 2 hours   - Utilize friction reducing device or surface for transfers   - Consider consults to  interdisciplinary teams such as wound care, PT/OT  - Use incontinent care products after each incontinent episode   - Consider nutrition services referral as needed  Outcome: Progressing     Problem: MUSCULOSKELETAL - ADULT  Goal: Maintain or return mobility to safest level of function  Description: INTERVENTIONS:  - Assess patient's ability to carry out ADLs; assess patient's baseline for ADL function and identify physical deficits which impact ability to perform ADLs (bathing, care of mouth/teeth, toileting, grooming, dressing, etc )  - Assess/evaluate cause of self-care deficits   - Assess range of motion  - Assess patient's mobility  - Assess patient's need for assistive devices and provide as appropriate  - Encourage maximum independence but intervene and supervise when necessary  - Involve family in performance of ADLs  - Assess for home care needs following discharge   - Consider OT consult to assist with ADL evaluation and planning for discharge  - Provide patient education as appropriate  Outcome: Progressing  Goal: Maintain proper alignment of affected body part  Description: INTERVENTIONS:  - Support, maintain and protect limb and body alignment  - Provide patient/ family with appropriate education  Outcome: Progressing     Problem: CARDIOVASCULAR - ADULT  Goal: Maintains optimal cardiac output and hemodynamic stability  Description: INTERVENTIONS:  - Monitor I/O, vital signs and rhythm  - Monitor for S/S and trends of decreased cardiac output  - Administer and titrate ordered vasoactive medications to optimize hemodynamic stability  - Assess quality of pulses, skin color and temperature  - Assess for signs of decreased coronary artery perfusion  - Instruct patient to report change in severity of symptoms  Outcome: Progressing     Problem: RESPIRATORY - ADULT  Goal: Achieves optimal ventilation and oxygenation  Description: INTERVENTIONS:  - Assess for changes in respiratory status  - Assess for changes in mentation and behavior  - Position to facilitate oxygenation and minimize respiratory effort  - Oxygen administered by appropriate delivery if ordered  - Initiate smoking cessation education as indicated  - Encourage broncho-pulmonary hygiene including cough, deep breathe, Incentive Spirometry  - Assess the need for suctioning and aspirate as needed  - Assess and instruct to report SOB or any respiratory difficulty  - Respiratory Therapy support as indicated  Outcome: Progressing     Problem: Prexisting or High Potential for Compromised Skin Integrity  Goal: Skin integrity is maintained or improved  Description: INTERVENTIONS:  - Identify patients at risk for skin breakdown  - Assess and monitor skin integrity  - Assess and monitor nutrition and hydration status  - Monitor labs   - Assess for incontinence   - Turn and reposition patient  - Assist with mobility/ambulation  - Relieve pressure over bony prominences  - Avoid friction and shearing  - Provide appropriate hygiene as needed including keeping skin clean and dry  - Evaluate need for skin moisturizer/barrier cream  - Collaborate with interdisciplinary team   - Patient/family teaching  - Consider wound care consult   Outcome: Progressing     Problem: Nutrition/Hydration-ADULT  Goal: Nutrient/Hydration intake appropriate for improving, restoring or maintaining nutritional needs  Description: Monitor and assess patient's nutrition/hydration status for malnutrition  Collaborate with interdisciplinary team and initiate plan and interventions as ordered  Monitor patient's weight and dietary intake as ordered or per policy  Utilize nutrition screening tool and intervene as necessary  Determine patient's food preferences and provide high-protein, high-caloric foods as appropriate       INTERVENTIONS:  - Monitor oral intake, urinary output, labs, and treatment plans  - Assess nutrition and hydration status and recommend course of action  - Evaluate amount of meals eaten  - Assist patient with eating if necessary   - Allow adequate time for meals  - Recommend/ encourage appropriate diets, oral nutritional supplements, and vitamin/mineral supplements  - Order, calculate, and assess calorie counts as needed  - Recommend, monitor, and adjust tube feedings and TPN/PPN based on assessed needs  - Assess need for intravenous fluids  - Provide specific nutrition/hydration education as appropriate  - Include patient/family/caregiver in decisions related to nutrition  Outcome: Progressing     Problem: GENITOURINARY - ADULT  Goal: Maintains or returns to baseline urinary function  Description: INTERVENTIONS:  - Assess urinary function  - Encourage oral fluids to ensure adequate hydration if ordered  - Administer IV fluids as ordered to ensure adequate hydration  - Administer ordered medications as needed  - Offer frequent toileting  - Follow urinary retention protocol if ordered  Outcome: Progressing  Goal: Absence of urinary retention  Description: INTERVENTIONS:  - Assess patient’s ability to void and empty bladder  - Monitor I/O  - Bladder scan as needed  - Discuss with physician/AP medications to alleviate retention as needed  - Discuss catheterization for long term situations as appropriate  Outcome: Progressing     Problem: HEMATOLOGIC - ADULT  Goal: Maintains hematologic stability  Description: INTERVENTIONS  - Assess for signs and symptoms of bleeding or hemorrhage  - Monitor labs  - Administer supportive blood products/factors as ordered and appropriate  Outcome: Progressing

## 2022-12-23 NOTE — PHYSICAL THERAPY NOTE
PHYSICAL THERAPY NOTE          Patient Name: Madi [facility] Nadeem  MRJBW'R Date: 12/23/2022  Time: 0103-0303       12/23/22 1431   PT Last Visit   PT Visit Date 12/23/22   Note Type   Note Type Treatment   Pain Assessment   Pain Assessment Tool 0-10   Pain Score 10 - Worst Possible Pain   Pain Location/Orientation Orientation: Right;Location: Leg   Hospital Pain Intervention(s) Repositioned; Ambulation/increased activity; Emotional support; Rest   Pain Rating: FLACC (Rest) - Face 0   Pain Rating: FLACC (Rest) - Legs 0   Pain Rating: FLACC (Rest) - Activity 0   Pain Rating: FLACC (Rest) - Cry 1   Pain Rating: FLACC (Rest) - Consolability 0   Score: FLACC (Rest) 1   Pain Rating: FLACC (Activity) - Face 1   Pain Rating: FLACC (Activity) - Legs 0   Pain Rating: FLACC (Activity) - Activity 0   Pain Rating: FLACC (Activity) - Cry 1   Pain Rating: FLACC (Activity) - Consolability 0   Score: FLACC (Activity) 2   Restrictions/Precautions   Weight Bearing Precautions Per Order Yes   RLE Weight Bearing Per Order NWB   LLE Weight Bearing Per Order NWB   Other Precautions Multiple lines; Fall Risk;Pain;Contact/isolation   General   Chart Reviewed Yes   Response to Previous Treatment Patient with no complaints from previous session  Cognition   Overall Cognitive Status WFL   Arousal/Participation Cooperative   Following Commands Follows one step commands without difficulty   Subjective   Subjective "sorry i couldnt do more"   Bed Mobility   Rolling R 2  Maximal assistance   Additional items Bedrails; Increased time required;Verbal cues; Assist x 1   Rolling L 2  Maximal assistance   Additional items Assist x 1;Bedrails; Increased time required;Verbal cues   Additional Comments max Ax2 to long sit using bl bedrails   Balance   Static Sitting Zero   Endurance Deficit   Endurance Deficit Yes   Endurance Deficit Description easily fatigues   able to hold long sit position 15" x1 then <5" x1   Activity Tolerance   Activity Tolerance Patient limited by pain;Treatment limited secondary to medical complications (Comment)  (deconditioning)   Medical Staff [facility] RN   Exercises   Hip Flexion Supine;5 reps;Bilateral   Assessment   Prognosis Guarded   Problem List Decreased strength; Impaired balance;Decreased mobility; Impaired sensation;Decreased skin integrity;Pain;Decreased endurance   Assessment Matthias Bowen was seen for a f/u session and to update POC as appropriate  Pt has made little functional progress since admission however progress in therapy significantly limited by medical complications including multiple surgeries w significant BLE amputations, +CVA, pain  She consistently requires max Ax2 for bed mobility  Unable to tolerate full supine<>sit transf due to BLE R>L pain, weakness (poor trunk control and UE support), deconditioning  Pt require assist to initiate and complete long sit transf however able to hold herself unsupported w use of bl handrails about 15"  Pt will continue to benefit from therapy services to facilitate recovery; if patient w continued limited progress in therapy over the next two weeks may consider transition to restorative program  Will likely require LTC upon d/c given significant functional changes secondary to surgical intervention, environmental and social barriers  Barriers to Discharge Inaccessible home environment;Decreased caregiver support   Barriers to Discharge Comments steps, lives alone   Goals   Patient Goals reduce pain   STG Expiration Date (S)  01/02/23   Short Term Goal #1 (S)  1)  Pt will perform bed mobility with mod Ax2 demonstrating appropriate technique 100% of the time in order to improve function  2) Improve unsupported sitting balance to poor + to improve safety during transf  3) Improve static sitting tolerance >5 mins to improve tolerance to functional transf and ADLs   4) Increase activity tolerance to 45 minutes in order to improve endurance to functional tasks  5) PT for ongoing patient and family/caregiver education, DME needs and d/c planning in order to promote highest level of function in least restrictive environment  PT Treatment Day 18   Plan   Treatment/Interventions Functional transfer training;LE strengthening/ROM; Therapeutic exercise; Endurance training;Patient/family training;Equipment eval/education; Bed mobility; Compensatory technique education;Continued evaluation;Spoke to nursing;OT   Progress Slow progress, decreased activity tolerance   PT Frequency (S)  1-2x/wk   Recommendation   PT Discharge Recommendation Post acute rehabilitation services  (will likely require LTC)   Equipment Recommended   (andree TURNER)   AM-PAC Basic Mobility Inpatient   Turning in Bed Without Bedrails 1   Lying on Back to Sitting on Edge of Flat Bed 1   Moving Bed to Chair 1   Standing Up From Chair 1   Walk in Room 1   Climb 3-5 Stairs 1   Basic Mobility Inpatient Raw Score 6   Highest Level Of Mobility   JH-HLM Goal 2: Bed activities/Dependent transfer   JH-HLM Achieved 2: Bed activities/Dependent transfer   Education   Education Provided Mobility training   Patient Reinforcement needed   End of Consult   Patient Position at End of Consult Supine;Bed/Chair alarm activated; All needs within reach       Deven Oh PT

## 2022-12-23 NOTE — ASSESSMENT & PLAN NOTE
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

## 2022-12-23 NOTE — QUICK NOTE
Patient has now fully transitioned to warfarin from argatroban drip, maintaining INR of ~2  Anemia stable  Vascular surgery on board  Continue transfusion support as needed for hemoglobin <7 g/dL  Will sign off  Please do not hesitate to reach out for questions or concerns        Robyn Martinez, RONALD, CRNP,  Hematology-Oncology

## 2022-12-23 NOTE — PROGRESS NOTES
[address] [facility]  Progress Note - [address] [facility] [address] 1960, 65 y o  female MRN: [id number]  Unit/Bed#: E4 -01 Encounter: [id number]  Primary Care Provider: ALEX Medrano   Date and time admitted to hospital: 10/21/2022  7:58 PM    Febrile  Assessment & Plan  Intermittent fever; but currently afebrile x > 48 hours  UA positive, but likely contaminant rather than an actual urinary tract infection  Possibly post op fever  · Continue to have fever; wound cultures from recent wound washout with enterobacteria  · ID recommending 10 days of cefepime twice daily      HIT (heparin-induced thrombocytopenia)  Assessment & Plan  · Positive heparin antibody on 11/11/2022  · Completed 5 days of overlap with argatroban and Coumadin   · Continue coumadin for minimum of 4 weeks  · INR therapeutic; however appears to decline on 3 mg daily; have increased to 4 mg daily  · Goal INR 2-3    Diabetic ulcer of heel associated with diabetes mellitus due to underlying condition [facility])  Assessment & Plan  S/p left aka 11/23/2022  · Post-op wound management per vascular surgery  · Await next step in management from vascular surgery, underwent surgical washout of right stump debridement of left stump on 12/19  · Pain appears well controlled on OxyContin 10 twice daily and gabapentin 400mg tid  · As needed pain medications taken sparingly    Acute on chronic anemia  Assessment & Plan  · She has had multiple  blood transfusions during this admission  · Last blood transfusion was on  12/15/2022 prior to her surgery    · Monitor  · Hemoglobin continues to decline, FOBT negative  · Review of recent iron panel appears to show picture of anemia of chronic disease  · Additionally, iron noted to be low, will initiate iron supplementation  · 12/23-appears to be stable at 7 4, monitor    Type 2 diabetes mellitus with hyperglycemia, with long-term current use of insulin [facility])  Assessment & Plan  Lab Results Component Value Date    HGBA1C 9 8 (H) 10/25/2022     Recent Labs     12/22/22  2050 12/23/22  0751 12/23/22  1152 12/23/22  1554   POCGLU 181* 104 124 149*     Significantly reduced regimen from home due to poor oral intake  Goal sugar < 180  Continue Lantus to 10 units at night  Continue sliding scale insulin    Stroke [facility])  Assessment & Plan  · Noted to have change in mental status on 11/13/2022  · CT confirmed moderate right parietal lobe stroke, likely embolic in the setting of HIT with thrombosis  · She was treated with argatroban/coumadin bridge  · INR therapeutic  · Goal INR 2-3    Benign essential hypertension  Assessment & Plan  Continue amlodipine 10 mg daily, doxazosin 4 mg at bedtime, metoprolol 25 mg daily, lisinopril 40 mg daily with hold parameters  12/23-blood pressure remains elevated, unclear from underlying pain given recent surgery or if uncontrolled on current medications now the patient's health is generally improved  - have increased metoprolol to 50 mg daily; as needed labetalol for SBP greater than 170    Depression, recurrent (HCC)  Assessment & Plan  Evaluated by Psychiatry x 2 on this admission  ·  continue Zoloft 175 mg daily, Wellbutrin 150 mg daily, Seroquel 25 mg at bedtime  * PAD (peripheral artery disease) ([facility] [address] )  Assessment & Plan  · She was initially admitted for nonhealing wound of the left lower extremity, requiring multiple endovascular interventions and ultimately underwent left AKA on 11/23/2022 and right AKA on 12/1/22  · S/p right stump debridement and vac placement 12/16/22  · For additional right stump washout and VAC exchange today- 12/19/2022  per surgery    · Continue pain control  · Eventual short-term rehab placement when medically stable  · Management per primary service      VTE Pharmacologic Prophylaxis:   Pharmacologic: Warfarin (Coumadin)  Mechanical VTE Prophylaxis in Place: No    Patient Centered Rounds: I have performed bedside rounds with nursing staff today     Discussions with Specialists or Other Care Team Provider:     Education and Discussions with Family / Patient: Care plan discussed with patient who voiced understanding and agrees with recommendations  Time Spent for Care: 45 minutes  More than 50% of total time spent on counseling and coordination of care as described above  Current Length of Stay: 58 day(s)    Current Patient Status: Inpatient   Certification Statement: The patient will continue to require additional inpatient hospital stay due to Treatment of bilateral AKA    Discharge Plan: As per primary team    Code Status: Level 1 - Full Code      Subjective:   No acute events overnight; blood pressure medications increased as noted above  INR trending down on 3 mg; still therapeutic but will increase to 4 mg starting tomorrow  Patient appears improved; pain well controlled  Hemoglobin also improved with iron supplementation, continue iron and monitor hemoglobin on morning labs  Patient noted to have PICC line; will discuss with vascular surgery team moving forward and consider discontinuation in the next 48 hours  Objective:     Vitals:   Temp (24hrs), Av 7 °F (37 1 °C), Min:98 2 °F (36 8 °C), Max:99 6 °F (37 6 °C)    Temp:  [98 2 °F (36 8 °C)-99 6 °F (37 6 °C)] 99 6 °F (37 6 °C)  HR:  [82-90] 82  Resp:  [18-20] 18  BP: (163-181)/() 181/101  SpO2:  [92 %-93 %] 93 %  Body mass index is 26 94 kg/m²  Input and Output Summary (last 24 hours): Intake/Output Summary (Last 24 hours) at 2022 1823  Last data filed at 2022 0601  Gross per 24 hour   Intake 360 ml   Output 275 ml   Net 85 ml       Physical Exam:     Physical Exam  Vitals and nursing note reviewed  Constitutional:       General: She is not in acute distress  Appearance: She is well-developed  HENT:      Head: Normocephalic and atraumatic     Eyes:      Conjunctiva/sclera: Conjunctivae normal    Cardiovascular:      Rate and Rhythm: Normal rate       Heart sounds: No murmur heard  Pulmonary:      Effort: Pulmonary effort is normal  No respiratory distress  Abdominal:      Palpations: Abdomen is soft  Tenderness: There is no abdominal tenderness  Musculoskeletal:         General: Tenderness and deformity present  No swelling  Cervical back: Neck supple  Comments: Bilateral AKA  PICC line   Skin:     General: Skin is warm and dry  Neurological:      Mental Status: She is alert and oriented to person, place, and time  Psychiatric:         Mood and Affect: Mood normal          Behavior: Behavior normal            Additional Data:     Labs:    Results from last 7 days   Lab Units 12/23/22  0656   WBC Thousand/uL 4 57   HEMOGLOBIN g/dL 7 4*   HEMATOCRIT % 24 9*   PLATELETS Thousands/uL 200   NEUTROS PCT % 70   LYMPHS PCT % 17   MONOS PCT % 9   EOS PCT % 3     Results from last 7 days   Lab Units 12/23/22  0656 12/22/22  0539   SODIUM mmol/L 141 139   POTASSIUM mmol/L 3 4* 3 6   CHLORIDE mmol/L 104 103   CO2 mmol/L 27 28   BUN mg/dL 8 10   CREATININE mg/dL 0 65 0 65   ANION GAP mmol/L 10 8   CALCIUM mg/dL 7 9* 7 6*   ALBUMIN g/dL  --  1 4*   TOTAL BILIRUBIN mg/dL  --  0 27   ALK PHOS U/L  --  167*   ALT U/L  --  13   AST U/L  --  27   GLUCOSE RANDOM mg/dL 108 109     Results from last 7 days   Lab Units 12/23/22  0656   INR  2 09*     Results from last 7 days   Lab Units 12/23/22  1554 12/23/22  1152 12/23/22  0751 12/22/22  2050 12/22/22  1627 12/22/22  1134 12/22/22  0729 12/21/22  2100 12/21/22  1619 12/21/22  1125 12/21/22  0752 12/20/22  2057   POC GLUCOSE mg/dl 149* 124 104 181* 121 103 105 146* 110 129 104 121         Results from last 7 days   Lab Units 12/20/22  0606   PROCALCITONIN ng/ml 0 08           * I Have Reviewed All Lab Data Listed Above  * Additional Pertinent Lab Tests Reviewed:  All Labs Within Last 24 Hours Reviewed    Imaging:    Imaging Reports Reviewed Today Include:   Imaging Personally Reviewed by Myself Includes:      Recent Cultures (last 7 days):     Results from last 7 days   Lab Units 12/19/22  1214   GRAM STAIN RESULT  Rare Polys  No bacteria seen       Last 24 Hours Medication List:   Current Facility-Administered Medications   Medication Dose Route Frequency Provider Last Rate   • acetaminophen  650 mg Oral Q6H PRN Carmenhenry Landin, DO     • ALPRAZolam  0 25 mg Oral Daily PRN Carmenhenry Landin, DO     • aluminum-magnesium hydroxide-simethicone  30 mL Oral Q4H PRN Carmenhenry Landin, DO     • amLODIPine  10 mg Oral Daily Janeen Bamrizwan, DO     • atorvastatin  40 mg Oral Daily With [address], DO     • buPROPion  150 mg Oral Daily Janeen Bame, DO     • clopidogrel  75 mg Oral Daily Janeen Bame, DO     • collagenase   Topical Daily Janeen Bamrizwan, DO     • doxazosin  4 mg Oral HS Carmen Bamrizwan, DO     • doxycycline hyclate  100 mg Oral Q12H Sagrario Paul MD     • [START ON 12/24/2022] ferrous sulfate  325 mg Oral Daily With Lunch Calogero Reba, DO     • gabapentin  400 mg Oral TID Carmen Bamrizwan, DO     • insulin glargine  10 Units Subcutaneous HS Janeen Bamrizwan, DO     • insulin lispro  1-6 Units Subcutaneous TID AC Carmen Bamrizwan, DO     • labetalol  10 mg Intravenous Q6H PRN Carmenhenry Landin, DO     • lisinopril  40 mg Oral Daily Janeen Bamrizwan, DO     • methocarbamol  1,000 mg Oral [facility] Carmenhenry Landin, DO     • metoclopramide  10 mg Intravenous Q6H PRN Carmen Landin, DO     • [START ON 12/24/2022] metoprolol succinate  50 mg Oral Daily Mackenzie Thompson MD     • naloxone  0 04 mg Intravenous Q1MIN PRN Carmenhenry Landin, DO     • nystatin   Topical BID Janeen Mushtaq, DO     • ondansetron  4 mg Intravenous Q6H PRN Carmen Landin, DO     • oxyCODONE  10 mg Oral Q12H [address] Janeen Mushtaq, DO     • oxyCODONE  2 5 mg Oral Q4H PRN Carmen Landin, DO      Or   • oxyCODONE  5 mg Oral Q4H PRN Dennis Plume Sharifa Melissa,       Or   • oxyCODONE  7 5 mg Oral Q4H PRN Varun Overall, DO     • pantoprazole  40 mg Oral Early Morning Varun Overall, DO     • polyethylene glycol  17 g Oral BID Varun Overall, DO     • [START ON 12/24/2022] potassium chloride  20 mEq Oral Daily Richmond Bragg MD     • potassium chloride  40 mEq Oral Once Richmond Bragg MD     • QUEtiapine  25 mg Oral HS Varun Overall, DO     • senna  1 tablet Oral BID Varun Overall, DO     • sertraline  175 mg Oral Daily Varun Overall, DO     • [START ON 12/24/2022] warfarin  4 mg Oral Daily (warfarin) Richmond Bragg MD          Today, Patient Was Seen By: Rosalba Wilson MD    ** Please Note: Dictation voice to text software may have been used in the creation of this document   **

## 2022-12-23 NOTE — PROGRESS NOTES
Progress Note - Infectious Disease   Daniel Shah 65 y o  female MRN: [id number]  Unit/Bed#: E4 -01 Encounter: [id number]      Impression/Plan:    1  Recent Fever: Resolved  - Patient developed new fever on 12/19, though appears to correlate post op from her right AKA wound VAC change and left AKA stump site I&D  Suspect reactive to surgery and possibly to the infection of left AKA site  Now afebrile and is without leukocytosis  - antibiotics as below  - trend fever curve  - if has another fever would repeat blood cultures, check CXR and repeat COVID/Flu/RSV PCR    2  Left AKA Stump Site Abscess:  - S/p OR last on 12/19 with abscess cavity found after stable removal of left AKA stump site  Purulent drainage sent for culture, growing Enterobacter cloacae  Discussed with vascular team, no current concern for bone infection but rather soft tissue only    - continue Cefepime 2g q12h  - follow up OR sensitivities  - anticipate 10 days total of antibiotic, through  12/31/2022  - appreciate Vascular surgery team to alert us if any concern for worsening appearance of site or deeper infection    3  Cough:  - Endorses new cough over last several days, non-productive  Stable on room air     - monitor symptoms for now  - if patient develops fever, hypoxia or worsening symptoms then would check:   - CXR   - sputum culture   - COVID/Flu/RSV PCR    4  Sever PAD c/b bilateral atheroembolic events and limb ischemia, s/p bilateral AKA:  - Had left AKA 11/23, right AKA 12/1, right AKA stump washout with VAC placement 12/16, and right AKA VAC change with left AKA stump washout on 12/19      - vascular surgery following    5  Right Hemispheric CVA:  - CT head this admission noted new acute stroke on 11/13  6  T2DM:  - management per priamry    Antibiotics:  Cefepime: 2    Subjective:  Patient denies fever, chills, new pain at AKA sites  Currently tolerating VAC change at bedside       Objective:  Vitals:  Temp:  [98 4 °F (36 9 °C)-99 °F (37 2 °C)] 98 4 °F (36 9 °C)  HR:  [84-90] 90  Resp:  [] 20  BP: (154-165)/(71-84) 163/71  SpO2:  [92 %-95 %] 92 %  Temp (24hrs), Av 8 °F (37 1 °C), Min:98 4 °F (36 9 °C), Max:99 °F (37 2 °C)  Current: Temperature: 98 4 °F (36 9 °C)    Physical Exam:   General Appearance:  Alert, interactive, nontoxic, no acute distress  Throat: Oropharynx moist without lesions  Lungs:   Clear to auscultation bilaterally; no wheezes, rhonchi or rales; respirations unlabored   Heart:  RRR; no murmur, rub or gallop   Abdomen:   Soft, non-tender, non-distended, positive bowel sounds  Extremities: Bilateral AKA's present; wound VAC on right AKA   Skin: No new rashes or lesions  No draining wounds noted  Labs:    All pertinent labs and imaging studies were personally reviewed  Results from last 7 days   Lab Units 22  0656 22  0539 22  0636   WBC Thousand/uL 4 57 3 85* 3 69*   HEMOGLOBIN g/dL 7 4* 7 3* 7 8*   PLATELETS Thousands/uL 200 214 215     Results from last 7 days   Lab Units 22  0539 22  0636 22  0606   SODIUM mmol/L 139 140 140   POTASSIUM mmol/L 3 6 3 3* 3 6   CHLORIDE mmol/L 103 103 103   CO2 mmol/L 28 28 27   BUN mg/dL 10 10 11   CREATININE mg/dL 0 65 0 63 0 72   EGFR ml/min/1 73sq m 96 97 90   CALCIUM mg/dL 7 6* 8 0* 8 2*   AST U/L 27 23 22   ALT U/L 13 8* 10*   ALK PHOS U/L 167* 165* 170*     Results from last 7 days   Lab Units 22  0606   PROCALCITONIN ng/ml 0 08                   Micro:  Results from last 7 days   Lab Units 22  1214   GRAM STAIN RESULT  Rare Polys  No bacteria seen       Imaging:          Rom Stevenson MD  [facility]

## 2022-12-23 NOTE — CASE MANAGEMENT
Case Management Progress Note    Patient name Arleen Herman  Location [facility] [facility] /E4 [facility]-* MRN [id number]  : 1960 Date 2022       LOS (days): 62  Geometric Mean LOS (GMLOS) (days):   Days to GMLOS:        OBJECTIVE:        Current admission status: Inpatient  [facility]erred Pharmacy:   [id number], [address] [place] [address] [address]  [address]  [address] [facility]  Phone: [phone] Fax: [phone]    Primary Care Provider: ALEX Chinchilla    Primary Insurance: BLUE CROSS  Secondary Insurance:     PROGRESS NOTE:  CM spoke with pt's Sajan Gil, via phone  Obinna Watts confirmed she is in the process of applying for Medicaid and was told to call hospital CM to assist in choosing the right plan for pt's medical needs  CM requested that Obinna Watts call back on Monday to speak with ongoing CM for support  CM stated vascular reported pt may be medically cleared on Monday  CM dept will call Obinna Watts on Monday to confirm whether pt is d/c      PT/OT saw pt today  Notes uploaded to [facility] and auth tasked to [facility] discharge support team  CM updated  [facility] [address] regarding pt's anticipated d/c timeframe

## 2022-12-23 NOTE — OCCUPATIONAL THERAPY NOTE
Occupational Therapy Progress Note(time=0779-5146)     Patient Name: Arleen Herman  OJJCK'U Date: 12/23/2022  Problem List  Principal Problem:    PAD (peripheral artery disease) (HCC)  Active Problems:    Depression, recurrent (HCC)    Benign essential hypertension    Stroke ([facility] [address] )    Type 2 diabetes mellitus with hyperglycemia, with long-term current use of insulin (HCC)    Acute on chronic anemia    Diabetic ulcer of heel associated with diabetes mellitus due to underlying condition [facility])    HIT (heparin-induced thrombocytopenia)    Febrile            12/23/22 1408   Note Type   Note Type Treatment   Pain Assessment   Pain Assessment Tool 0-10   Pain Score 10 - Worst Possible Pain   Pain Location/Orientation Orientation: Right;Location: Leg   Pain Rating: FLACC (Rest) - Face 0   Pain Rating: FLACC (Rest) - Legs 0   Pain Rating: FLACC (Rest) - Activity 0   Pain Rating: FLACC (Rest) - Cry 1   Pain Rating: FLACC (Rest) - Consolability 0   Score: FLACC (Rest) 1   Restrictions/Precautions   Weight Bearing Precautions Per Order Yes   RLE Weight Bearing Per Order NWB   LLE Weight Bearing Per Order NWB   Other Precautions Contact/isolation; Fall Risk;Pain   ADL   Where Assessed Supine, bed   Eating Assistance 5  Supervision/Setup   Grooming Assistance 4  Minimal Assistance   UB Bathing Assistance 3  Moderate Assistance   LB Bathing Assistance 2  Maximal Assistance   UB Dressing Assistance 3  Moderate Assistance   LB Dressing Assistance 1  Total Assistance   Toileting Assistance  1  Total Assistance   Bed Mobility   Rolling R 2  Maximal assistance   Additional items Assist x 1; Increased time required;Verbal cues;LE management   Rolling L 2  Maximal assistance   Additional items Assist x 1; Increased time required;Verbal cues;LE management   Supine to Sit 2  Maximal assistance  (long sit in bed)   Additional items Increased time required   Functional Mobility   Functional Mobility   (continue to recommend [facility] for OOB with nsg)   Therapeutic Excerise-Strength   UE Strength   (b/l UE AROM exercises, 1 set, 10 reps(with dowel--shr/elbow flex/ext, chest press, shr circles; R UE strength=3+/5, L UE-shr=3/5, elbow-distal 3+/5)   Subjective   Subjective "It took them 1hr and 1/2 for them to do the dressing change  My legs are sore "   Cognition   Overall Cognitive Status Impaired   Arousal/Participation Alert   Attention Attends with cues to redirect   Orientation Level Oriented X4   Memory Decreased short term memory;Decreased recall of precautions   Following Commands Follows one step commands with increased time or repetition   Activity Tolerance   Activity Tolerance Patient limited by fatigue;Patient limited by pain   Medical Staff Made Aware nsg, P T , CM   Assessment   Assessment Pt seen for 25min tx session with focus on functional balance, functional mobility, ADL status, transfer safety, b/l UE strengthening, and cognition  Nsg able to provided pain medication prior to tx session with allowed time for effectiveness  Pt demonstrating need for assistance with her UE and LE ADLs; gown and sheets wet  Pt able to tolerate long-sit in bed x 2 for 1min, requiring heavy assistance; sitting balance=p+/p  Pt required heavy assistance with all functional mobility tasks  Pt declining EOB sitting 2* LE pain; recent dressing change  Slight cognitive deficits noted--i e direction-following, memory  Pt continues to demonstrate appropriateness for inpt rehab to improve her overall level of independence  Will continue  See below noted for updated goals  The patient's raw score on the AM-PAC Daily Activity inpatient short form is 13, standardized score is 32 03, less than 39 4  Patients at this level are likely to benefit from discharge to post-acute rehabilitation services  Please refer to the recommendation of the Occupational Therapist for safe discharge planning     Plan   Treatment Interventions ADL retraining;Functional transfer training;UE strengthening/ROM; Endurance training;Cognitive reorientation;Patient/family training;Equipment evaluation/education; Compensatory technique education;Continued evaluation   Goal Expiration Date 01/06/23   OT Treatment Day 16   OT Frequency 2-3x/wk   Recommendation   OT Discharge Recommendation Post acute rehabilitation services   AM-PAC Daily Activity Inpatient   Lower Body Dressing 2   Bathing 2   Toileting 1   Upper Body Dressing 2   Grooming 3   Eating 3   Daily Activity Raw Score 13   Daily Activity Standardized Score (Calc for Raw Score >=11) 32 03   AM-PAC Applied Cognition Inpatient   Following a Speech/Presentation 4   Understanding Ordinary Conversation 3   Taking Medications 3   Remembering Where Things Are Placed or Put Away 3   Remembering List of 4-5 Errands 2   Taking Care of Complicated Tasks 2   Applied Cognition Raw Score 17   Applied Cognition Standardized Score 36 52   Updated goals:  Pt will demonstrate mod A with her bed mobility to facilitate EOB ADLs  Pt will demonstrate improved functional balance by 1/2 grade to assist with ADLs/transfers  Pt will demonstrate improved b/l UE strength by 1/2 MM grade to assist with ADLs/transfers  Pt will demonstrate Lillian with her UE and modA with her LE bathing/dressing  Pt will tolerate continued cognitive/home-safety assessment and appropriate d/c recommendations will be provided  Pt will demonstrate improved activity tolerance to good(20-30mins) and sitting tolerance(on EOB) to assist with ADLs  Pt will demonstrate independence with her b/l UE % of the time   Toya Glaser

## 2022-12-23 NOTE — UTILIZATION REVIEW
Continued Stay Review    Date: 12/23                      Current Patient Class: IP Current Level of Care: MS    HPI:62 y o  female initially admitted on 10/22    Assessment/Plan:   Pt remains on IV antibiotics through 12/31   hgb 7 4 today  Pt is off oxygen  Reports new nonproductive cough in last several days  Continues with VAC R AKA site and able to tolerate VAC dsg changes at bedside  Has some new c/o pain at AKA sites, using oral analgesia adjusted to long-acting which is controlling pain well  On exam lungs clear  Continues with daily dressing and packing changes to L AKA stump  Vital Signs:   12/23/22 0751 98 2 °F (36 8 °C) 83 18 168/72 104 92 % -- -- None (Room air) Lying   12/22/22 2320 98 4 °F (36 9 °C) 90 20 163/71 98 92 % 20 0 L/min None (Room air) Lying   12/22/22 2133 -- -- -- 164/78 -- -- -- -- -- Lying   12/22/22 1930 -- -- -- -- -- -- -- -- None (Room air) --   12/22/22 1532 99 °F (37 2 °C) 84 20 154/76 89 95 % -- -- -- Lying   12/22/22 0813 98 9 °F (37 2 °C) -- 106 Abnormal  165/84 128 93 % -- -- Nasal cannula Lying   12/21/22 2318 98 °F (36 7 °C) 102 20 146/66 86 97 % 28 2 L/min Nasal cannula Lying   12/21/22 2135 98 °F (36 7 °C) -- -- 135/73 -- -- -- -- -- --   12/21/22 1450 97 8 °F (36 6 °C) 100 20 169/71 88 95 % -- -- Nasal cannula Lying   12/21/22 0800 98 2 °F (36 8 °C) 96 20 170/80 103 96 % -- -- Nasal cannula Lying     Pertinent Labs/Diagnostic Results:       Results from last 7 days   Lab Units 12/23/22  0656 12/22/22  0539 12/21/22  0636 12/20/22  0606 12/19/22  0448 12/19/22  0448   WBC Thousand/uL 4 57 3 85* 3 69* 5 37  --  6 95   HEMOGLOBIN g/dL 7 4* 7 3* 7 8* 8 3*  --  7 5*   HEMATOCRIT % 24 9* 24 3* 25 7* 27 5*  --  25 5*   PLATELETS Thousands/uL 200 214 215 228  --  262   NEUTROS ABS Thousands/µL 3 24 2 62 2 57 4 19   < >  --     < > = values in this interval not displayed           Results from last 7 days   Lab Units 12/23/22  0656 12/22/22  0539 12/21/22  0636 12/20/22  0606   SODIUM mmol/L 141 139 140 140   POTASSIUM mmol/L 3 4* 3 6 3 3* 3 6   CHLORIDE mmol/L 104 103 103 103   CO2 mmol/L 27 28 28 27   ANION GAP mmol/L 10 8 9 10   BUN mg/dL 8 10 10 11   CREATININE mg/dL 0 65 0 65 0 63 0 72   EGFR ml/min/1 73sq m 96 96 97 90   CALCIUM mg/dL 7 9* 7 6* 8 0* 8 2*   MAGNESIUM mg/dL  --  1 4* 1 4* 1 6   PHOSPHORUS mg/dL  --  2 8 3 9 4 6*     Results from last 7 days   Lab Units 12/22/22  0539 12/21/22  0636 12/20/22  0606   AST U/L 27 23 22   ALT U/L 13 8* 10*   ALK PHOS U/L 167* 165* 170*   TOTAL PROTEIN g/dL 5 7* 5 9* 6 2*   ALBUMIN g/dL 1 4* 1 4* 1 5*   TOTAL BILIRUBIN mg/dL 0 27 0 26 0 36     Results from last 7 days   Lab Units 12/23/22  1152 12/23/22  0751 12/22/22  2050 12/22/22  1627 12/22/22  1134 12/22/22  0729 12/21/22  2100 12/21/22  1619 12/21/22  1125 12/21/22  0752 12/20/22  2057 12/20/22  1551   POC GLUCOSE mg/dl 124 104 181* 121 103 105 146* 110 129 104 121 170*     Results from last 7 days   Lab Units 12/23/22  0656 12/22/22  0539 12/21/22  0636 12/20/22  0606   GLUCOSE RANDOM mg/dL 108 109 94 88     Results from last 7 days   Lab Units 12/23/22  0656 12/22/22  0539 12/20/22  0606   PROTIME seconds 23 4* 27 3* 29 9*   INR  2 09* 2 56* 2 87*         Results from last 7 days   Lab Units 12/20/22  0606   PROCALCITONIN ng/ml 0 08     Results from last 7 days   Lab Units 12/19/22  1214   GRAM STAIN RESULT  Rare Polys  No bacteria seen         Medications:   Scheduled Medications:  amLODIPine, 10 mg, Oral, Daily  atorvastatin, 40 mg, Oral, Daily With Dinner  buPROPion, 150 mg, Oral, Daily  cefepime, 2,000 mg, Intravenous, Q12H  clopidogrel, 75 mg, Oral, Daily  collagenase, , Topical, Daily  doxazosin, 4 mg, Oral, HS  ferrous sulfate, 325 mg, Oral, Daily With Breakfast  gabapentin, 400 mg, Oral, TID  insulin glargine, 10 Units, Subcutaneous, HS  insulin lispro, 1-6 Units, Subcutaneous, TID AC  lisinopril, 40 mg, Oral, Daily  methocarbamol, 1,000 mg, Oral, Q8H [address]  metoprolol succinate, 25 mg, Oral, Daily  nystatin, , Topical, BID  oxyCODONE, 10 mg, Oral, Q12H MICHELLE  pantoprazole, 40 mg, Oral, Early Morning  polyethylene glycol, 17 g, Oral, BID  QUEtiapine, 25 mg, Oral, HS  senna, 1 tablet, Oral, BID  sertraline, 175 mg, Oral, Daily  warfarin, 3 mg, Oral, Daily (warfarin)      Continuous IV Infusions:     PRN Meds:  acetaminophen, 650 mg, Oral, Q6H PRN  ALPRAZolam, 0 25 mg, Oral, Daily PRN  aluminum-magnesium hydroxide-simethicone, 30 mL, Oral, Q4H PRN  labetalol, 10 mg, Intravenous, Q6H PRN  metoclopramide, 10 mg, Intravenous, Q6H PRN  naloxone, 0 04 mg, Intravenous, Q1MIN PRN  ondansetron, 4 mg, Intravenous, Q6H PRN  oxyCODONE, 2 5 mg, Oral, Q4H PRN   Or  oxyCODONE, 5 mg, Oral, Q4H PRN - x 1 12/23   Or  oxyCODONE, 7 5 mg, Oral, Q4H PRN - x 1 12/23    Discharge Plan: rehab      Network Utilization Review Department  ATTENTION: Please call with any questions or concerns to [phone] and carefully listen to the prompts so that you are directed to the right person  All voicemails are confidential   Lalo Oconnell all requests for admission clinical reviews, approved or denied determinations and any other requests to dedicated fax number below belonging to the campus where the patient is receiving treatment   List of dedicated fax numbers for the Facilities:  [address] [place] [address] DENIALS (Administrative/Medical Necessity) [phone]   [address] [place] [address] (Maternity/NICU/Pediatrics) [phone]   [address] [phone]   [facility]  [phone]   [address] [place] [id number] [place] [address] [address] [place]   [address] [facility] Maria D Houston [place] [address] 883 [facility] [phone]

## 2022-12-23 NOTE — PROGRESS NOTES
[facility]  Progress Note - Tawanda Dorsey 1960, 65 y o  female MRN: [id number]  Unit/Bed#: E4 -01 Encounter: [id number]  Primary Care Provider: ALEX Santos   Date and time admitted to hospital: 10/21/2022  7:58 PM    * PAD (peripheral artery disease) [facility])  Assessment & Plan  65year old female former smoker w/HTN, HLD, uncontrolled type II DM (A1c 9 8), hx CVA, presenting with nonhealing extensive L heel ulceration and R hallux and 5th digit ulceration  Vascular surgery consulted for input  S/p multiple b/l endovascular interventions  JUSTIN  R hemispheric CVA    S/p L AKA 11/23/22 [facility])  S/p R AKA, wound debridement 12/1/22 [facility]  S/p R AKA wound/stump washout and VAC placement 12/16/22 [facility])  S/p R AKA wound/stump VAC change, L AKA stump washout/packing 12/19/22 ([facility])    Hgb 7 3  WBC 3 85  sCr/eGFR 0 65/96  Wound Cx: enterobacter cloacae     Recommendations:  - Bilateral tissue loss in the setting of uncontrolled type II DM and NYDIA on admission, now s/p multiple angiograms w/intervention    - Endovascular interventions, c/b atheroembolic event to the RLE and JUSTIN with R hemispheric CVA  - Now s/p L AKA on 11/23/22 and R AKA 12/1/22 w/ proximal thigh wound debridements    - R AKA stump with incisional dehiscence and thigh wounds requiring debridement  Taken to the OR on 12/16 for washout and VAC placement with return to the OR 12/19 for VAC change  L AKA stump with some drainage, multiple staples removed from the incision and iodoform 1' packing placed in small opening  - Packing to be changed daily by nursing  - Appreciate ID for assistance with abx- L stump cultures growing out enterobacter cloacae  IV cefepime  - Right AKA wound VAC changed  Wound measurements 15 cm x 4 cm x 2 cm  Continue wound VAC therapy    Change MWF  - Incentive spirometer   - Medical management with statin, Plavix  - Continue coumadin  - Medical management and glucose control per SLIM  - Continue oral pain regimen, adjusted to long acting which appears to be controlling patient's pain well   - Appreciate psych input and management - addition of wellbutrin and adjustment of zoloft   - Disposition planning  - D/w Dr Bernard THIBODEAUX   Change Note    Date: 12/23/22    Location of wound: R AKA     Dimensions of wound: 15 cm x 4 cm x 2 cm    Description of wound: Beefy red, granular tissue     Sponges removed:  2 Black Sponges  0 White Sponges    Sponges placed:  3 Black Sponges 2 for wound (AKA and lateral stump) and 1 for bridge   0 White Sponges    VAC settings:  125 mmHg  Continuous     R AKA     R lateral AKA     L AKA            ALEX Vizcarra  12/23/2022  [facility]  [phone]

## 2022-12-23 NOTE — NUTRITION
Suggest short term nutrition support, PT w/poor po intake, malnourished, wt loss, nonhealing wounds  Please consult nutrition services/recommendations      Thank you,  ANDRES GantR  Clinical Nutrition

## 2022-12-23 NOTE — ASSESSMENT & PLAN NOTE
65year old female former smoker w/HTN, HLD, uncontrolled type II DM (A1c 9 8), hx CVA, presenting with nonhealing extensive L heel ulceration and R hallux and 5th digit ulceration  Vascular surgery consulted for input  S/p multiple b/l endovascular interventions  JUSTIN  R hemispheric CVA    S/p L AKA 11/23/22 [facility])  S/p R AKA, wound debridement 12/1/22 [facility])  S/p R AKA wound/stump washout and VAC placement 12/16/22 [facility])  S/p R AKA wound/stump VAC change, L AKA stump washout/packing 12/19/22 ([facility])    Hgb 7 3  WBC 3 85  sCr/eGFR 0 65/96  Wound Cx: enterobacter cloacae     Recommendations:  - Bilateral tissue loss in the setting of uncontrolled type II DM and NYDIA on admission, now s/p multiple angiograms w/intervention    - Endovascular interventions, c/b atheroembolic event to the RLE and JUSTIN with R hemispheric CVA  - Now s/p L AKA on 11/23/22 and R AKA 12/1/22 w/ proximal thigh wound debridements    - R AKA stump with incisional dehiscence and thigh wounds requiring debridement  Taken to the OR on 12/16 for washout and VAC placement with return to the OR 12/19 for VAC change  L AKA stump with some drainage, multiple staples removed from the incision and iodoform 1' packing placed in small opening  - Packing to be changed daily by nursing  - Appreciate ID for assistance with abx- L stump cultures growing out enterobacter cloacae  IV cefepime  - Right AKA wound VAC changed  Wound measurements 15 cm x 4 cm x 2 cm  Continue wound VAC therapy    Change MWF  - Incentive spirometer   - Medical management with statin, Plavix  - Continue coumadin  - Medical management and glucose control per SLIM  - Continue oral pain regimen, adjusted to long acting which appears to be controlling patient's pain well   - Appreciate psych input and management - addition of wellbutrin and adjustment of zoloft   - Disposition planning  - D/w Dr Rich Funes

## 2022-12-23 NOTE — PLAN OF CARE
Problem: OCCUPATIONAL THERAPY ADULT  Goal: Performs self-care activities at highest level of function for planned discharge setting  See evaluation for individualized goals  Description: Treatment Interventions: ADL retraining, Functional transfer training, UE strengthening/ROM, Endurance training, Cognitive reorientation, Patient/family training, Equipment evaluation/education, Compensatory technique education, Energy conservation, Activityengagement          See flowsheet documentation for full assessment, interventions and recommendations  Note: Limitation: Decreased ADL status, Decreased UE strength, Decreased endurance, Decreased high-level ADLs (New L visual field deficits)  Prognosis: Good  Assessment: Pt seen for 25min tx session with focus on functional balance, functional mobility, ADL status, transfer safety, b/l UE strengthening, and cognition  Nsg able to provided pain medication prior to tx session with allowed time for effectiveness  Pt demonstrating need for assistance with her UE and LE ADLs; gown and sheets wet  Pt able to tolerate long-sit in bed x 2 for 1min, requiring heavy assistance; sitting balance=p+/p  Pt required heavy assistance with all functional mobility tasks  Pt declining EOB sitting 2* LE pain; recent dressing change  Slight cognitive deficits noted--i e direction-following, memory  Pt continues to demonstrate appropriateness for inpt rehab to improve her overall level of independence  Will continue  See below noted for updated goals  The patient's raw score on the AM-PAC Daily Activity inpatient short form is 13, standardized score is 32 03, less than 39 4  Patients at this level are likely to benefit from discharge to post-acute rehabilitation services  Please refer to the recommendation of the Occupational Therapist for safe discharge planning       OT Discharge Recommendation: Post acute rehabilitation services

## 2022-12-23 NOTE — ASSESSMENT & PLAN NOTE
· Positive heparin antibody on 11/11/2022  · Completed 5 days of overlap with argatroban and Coumadin   · Continue coumadin for minimum of 4 weeks  · INR therapeutic; however appears to decline on 3 mg daily; have increased to 4 mg daily  · Goal INR 2-3

## 2022-12-23 NOTE — ASSESSMENT & PLAN NOTE
Continue amlodipine 10 mg daily, doxazosin 4 mg at bedtime, metoprolol 25 mg daily, lisinopril 40 mg daily with hold parameters  12/23-blood pressure remains elevated, unclear from underlying pain given recent surgery or if uncontrolled on current medications now the patient's health is generally improved  - have increased metoprolol to 50 mg daily; as needed labetalol for SBP greater than 170

## 2022-12-23 NOTE — ASSESSMENT & PLAN NOTE
· She has had multiple  blood transfusions during this admission  · Last blood transfusion was on  12/15/2022 prior to her surgery    · Monitor  · Hemoglobin continues to decline, FOBT negative  · Review of recent iron panel appears to show picture of anemia of chronic disease  · Additionally, iron noted to be low, will initiate iron supplementation  · 12/23-appears to be stable at 7 4, monitor

## 2022-12-24 LAB
ANION GAP SERPL CALCULATED.3IONS-SCNC: 8 MMOL/L (ref 4–13)
BASOPHILS # BLD AUTO: 0.01 THOUSANDS/ÂΜL (ref 0–0.1)
BASOPHILS NFR BLD AUTO: 0 % (ref 0–1)
BUN SERPL-MCNC: 8 MG/DL (ref 5–25)
CALCIUM SERPL-MCNC: 8.1 MG/DL (ref 8.3–10.1)
CHLORIDE SERPL-SCNC: 104 MMOL/L (ref 96–108)
CO2 SERPL-SCNC: 29 MMOL/L (ref 21–32)
CREAT SERPL-MCNC: 0.65 MG/DL (ref 0.6–1.3)
EOSINOPHIL # BLD AUTO: 0.14 THOUSAND/ÂΜL (ref 0–0.61)
EOSINOPHIL NFR BLD AUTO: 3 % (ref 0–6)
ERYTHROCYTE [DISTWIDTH] IN BLOOD BY AUTOMATED COUNT: 16.4 % (ref 11.6–15.1)
GFR SERPL CREATININE-BSD FRML MDRD: 96 ML/MIN/1.73SQ M
GLUCOSE SERPL-MCNC: 149 MG/DL (ref 65–140)
GLUCOSE SERPL-MCNC: 167 MG/DL (ref 65–140)
GLUCOSE SERPL-MCNC: 178 MG/DL (ref 65–140)
GLUCOSE SERPL-MCNC: 94 MG/DL (ref 65–140)
GLUCOSE SERPL-MCNC: 97 MG/DL (ref 65–140)
HCT VFR BLD AUTO: 26.1 % (ref 34.8–46.1)
HGB BLD-MCNC: 7.9 G/DL (ref 11.5–15.4)
IMM GRANULOCYTES # BLD AUTO: 0.05 THOUSAND/UL (ref 0–0.2)
IMM GRANULOCYTES NFR BLD AUTO: 1 % (ref 0–2)
LYMPHOCYTES # BLD AUTO: 1.06 THOUSANDS/ÂΜL (ref 0.6–4.47)
LYMPHOCYTES NFR BLD AUTO: 21 % (ref 14–44)
MCH RBC QN AUTO: 26.1 PG (ref 26.8–34.3)
MCHC RBC AUTO-ENTMCNC: 30.3 G/DL (ref 31.4–37.4)
MCV RBC AUTO: 86 FL (ref 82–98)
MONOCYTES # BLD AUTO: 0.43 THOUSAND/ÂΜL (ref 0.17–1.22)
MONOCYTES NFR BLD AUTO: 9 % (ref 4–12)
NEUTROPHILS # BLD AUTO: 3.28 THOUSANDS/ÂΜL (ref 1.85–7.62)
NEUTS SEG NFR BLD AUTO: 66 % (ref 43–75)
NRBC BLD AUTO-RTO: 0 /100 WBCS
PLATELET # BLD AUTO: 227 THOUSANDS/UL (ref 149–390)
PMV BLD AUTO: 8.6 FL (ref 8.9–12.7)
POTASSIUM SERPL-SCNC: 3.6 MMOL/L (ref 3.5–5.3)
RBC # BLD AUTO: 3.03 MILLION/UL (ref 3.81–5.12)
SODIUM SERPL-SCNC: 141 MMOL/L (ref 135–147)
WBC # BLD AUTO: 4.97 THOUSAND/UL (ref 4.31–10.16)

## 2022-12-24 PROCEDURE — NC001 PR NO CHARGE: Performed by: SURGERY

## 2022-12-24 PROCEDURE — 99233 SBSQ HOSP IP/OBS HIGH 50: CPT | Performed by: INTERNAL MEDICINE

## 2022-12-24 PROCEDURE — 85025 COMPLETE CBC W/AUTO DIFF WBC: CPT | Performed by: INTERNAL MEDICINE

## 2022-12-24 PROCEDURE — 82948 REAGENT STRIP/BLOOD GLUCOSE: CPT

## 2022-12-24 PROCEDURE — 80048 BASIC METABOLIC PNL TOTAL CA: CPT | Performed by: INTERNAL MEDICINE

## 2022-12-24 RX ADMIN — BUPROPION HYDROCHLORIDE 150 MG: 150 TABLET, FILM COATED, EXTENDED RELEASE ORAL at 08:54

## 2022-12-24 RX ADMIN — QUETIAPINE FUMARATE 25 MG: 25 TABLET ORAL at 21:10

## 2022-12-24 RX ADMIN — WARFARIN SODIUM 4 MG: 2 TABLET ORAL at 17:20

## 2022-12-24 RX ADMIN — CLOPIDOGREL BISULFATE 75 MG: 75 TABLET ORAL at 08:54

## 2022-12-24 RX ADMIN — METHOCARBAMOL 1000 MG: 500 TABLET ORAL at 05:03

## 2022-12-24 RX ADMIN — DOXAZOSIN 4 MG: 4 TABLET ORAL at 21:10

## 2022-12-24 RX ADMIN — SERTRALINE HYDROCHLORIDE 175 MG: 100 TABLET ORAL at 08:54

## 2022-12-24 RX ADMIN — POLYETHYLENE GLYCOL 3350 17 G: 17 POWDER, FOR SOLUTION ORAL at 20:37

## 2022-12-24 RX ADMIN — OXYCODONE HYDROCHLORIDE 10 MG: 10 TABLET, FILM COATED, EXTENDED RELEASE ORAL at 08:54

## 2022-12-24 RX ADMIN — DOXYCYCLINE 100 MG: 100 CAPSULE ORAL at 20:36

## 2022-12-24 RX ADMIN — FERROUS SULFATE TAB 325 MG (65 MG ELEMENTAL FE) 325 MG: 325 (65 FE) TAB at 12:14

## 2022-12-24 RX ADMIN — METHOCARBAMOL 1000 MG: 500 TABLET ORAL at 21:11

## 2022-12-24 RX ADMIN — OXYCODONE HYDROCHLORIDE 10 MG: 10 TABLET, FILM COATED, EXTENDED RELEASE ORAL at 20:37

## 2022-12-24 RX ADMIN — ATORVASTATIN CALCIUM 40 MG: 40 TABLET, FILM COATED ORAL at 17:19

## 2022-12-24 RX ADMIN — LISINOPRIL 40 MG: 20 TABLET ORAL at 08:54

## 2022-12-24 RX ADMIN — INSULIN LISPRO 1 UNITS: 100 INJECTION, SOLUTION INTRAVENOUS; SUBCUTANEOUS at 12:11

## 2022-12-24 RX ADMIN — POTASSIUM CHLORIDE 20 MEQ: 1500 TABLET, EXTENDED RELEASE ORAL at 08:54

## 2022-12-24 RX ADMIN — DOXYCYCLINE 100 MG: 100 CAPSULE ORAL at 08:54

## 2022-12-24 RX ADMIN — GABAPENTIN 400 MG: 400 CAPSULE ORAL at 08:54

## 2022-12-24 RX ADMIN — AMLODIPINE BESYLATE 10 MG: 10 TABLET ORAL at 08:54

## 2022-12-24 RX ADMIN — INSULIN GLARGINE 10 UNITS: 100 INJECTION, SOLUTION SUBCUTANEOUS at 21:47

## 2022-12-24 RX ADMIN — GABAPENTIN 400 MG: 400 CAPSULE ORAL at 20:36

## 2022-12-24 RX ADMIN — METHOCARBAMOL 1000 MG: 500 TABLET ORAL at 14:19

## 2022-12-24 RX ADMIN — GABAPENTIN 400 MG: 400 CAPSULE ORAL at 17:19

## 2022-12-24 RX ADMIN — PANTOPRAZOLE SODIUM 40 MG: 40 TABLET, DELAYED RELEASE ORAL at 05:03

## 2022-12-24 RX ADMIN — METOPROLOL SUCCINATE 50 MG: 50 TABLET, EXTENDED RELEASE ORAL at 08:54

## 2022-12-24 NOTE — ASSESSMENT & PLAN NOTE
· She has had multiple  blood transfusions during this admission  · Last blood transfusion was on  12/15/2022 prior to her surgery    · Monitor  · Hemoglobin continues to decline, FOBT negative  · Review of recent iron panel appears to show picture of anemia of chronic disease  · Additionally, iron noted to be low, will initiate iron supplementation  · 12/24-appears to be stable at 7 9, monitor

## 2022-12-24 NOTE — ASSESSMENT & PLAN NOTE
65year old female former smoker w/HTN, HLD, uncontrolled type II DM (A1c 9 8), hx CVA, presenting with nonhealing extensive L heel ulceration and R hallux and 5th digit ulceration  Vascular surgery consulted for input  S/p multiple b/l endovascular interventions  JUSTIN  R hemispheric CVA    S/p L AKA 11/23/22 [facility])  S/p R AKA, wound debridement 12/1/22 [facility])  S/p R AKA wound/stump washout and VAC placement 12/16/22 [facility])  S/p R AKA wound/stump VAC change, L AKA stump washout/packing 12/19/22 ([facility])      Wound Cx: enterobacter cloacae     Recommendations:  - Bilateral tissue loss in the setting of uncontrolled type II DM and NYDIA on admission, now s/p multiple angiograms w/intervention    - Endovascular interventions, c/b atheroembolic event to the RLE and JUSTIN with R hemispheric CVA  - Now s/p L AKA on 11/23/22 and R AKA 12/1/22 w/ proximal thigh wound debridements    - R AKA stump with incisional dehiscence and thigh wounds requiring debridement  Taken to the OR on 12/16 for washout and VAC placement with return to the OR 12/19 for VAC change  - L AKA stump incision dehiscence now closed  Continue local wound care  - Appreciate ID for assistance with abx- L stump cultures growing out enterobacter cloacae  Patient stable on current plan per ID- doxycycline through 1/2/23  - Right AKA wound VAC changed 12/28/22  Continue wound VAC therapy  Continuous suction 125 mmHg   change MWF-next change on Friday 12/30/22  - Incentive spirometer   - Medical management with statin, Plavix  - Continue coumadin  - Medical management and glucose control per SLIM  - Continue oral pain regimen, adjusted to long acting which appears to be controlling patient's pain well   - Appreciate psych input and management - addition of wellbutrin and adjustment of zoloft   - Disposition planning for rehab: will follow up with CM   - d/w Dr Luis Fernando Martinez

## 2022-12-24 NOTE — ASSESSMENT & PLAN NOTE
Lab Results   Component Value Date    HGBA1C 9 8 (H) 10/25/2022     Recent Labs     12/23/22  2346 12/24/22  0730 12/24/22  1152 12/24/22  1616   POCGLU 119 97 167* 149*     Significantly reduced regimen from home due to poor oral intake  Goal sugar < 180  Continue Lantus to 10 units at night  Continue sliding scale insulin

## 2022-12-24 NOTE — PROGRESS NOTES
[facility]  Progress Note [facility] [place] 1960, 65 y o  female MRN: [id number]  Unit/Bed#: E4 -01 Encounter: [id number]  Primary Care Provider: ALEX Brown   Date and time admitted to hospital: 10/21/2022  7:58 PM    * PAD (peripheral artery disease) [facility])  Assessment & Plan  65year old female former smoker w/HTN, HLD, uncontrolled type II DM (A1c 9 8), hx CVA, presenting with nonhealing extensive L heel ulceration and R hallux and 5th digit ulceration  Vascular surgery consulted for input  S/p multiple b/l endovascular interventions  JUSTIN  R hemispheric CVA    S/p L AKA 11/23/22 [facility])  S/p R AKA, wound debridement 12/1/22 [facility])  S/p R AKA wound/stump washout and VAC placement 12/16/22 [facility])  S/p R AKA wound/stump VAC change, L AKA stump washout/packing 12/19/22 ([facility])    Hgb 7 9  WBC 4 9  sCr/eGFR 0 65/96  Wound Cx: enterobacter cloacae     Recommendations:  - Bilateral tissue loss in the setting of uncontrolled type II DM and NYDIA on admission, now s/p multiple angiograms w/intervention    - Endovascular interventions, c/b atheroembolic event to the RLE and JUSTIN with R hemispheric CVA  - Now s/p L AKA on 11/23/22 and R AKA 12/1/22 w/ proximal thigh wound debridements    - R AKA stump with incisional dehiscence and thigh wounds requiring debridement  Taken to the OR on 12/16 for washout and VAC placement with return to the OR 12/19 for VAC change  L AKA stump with some drainage, multiple staples removed from the incision and iodoform 1' packing placed in small opening  - Packing to be changed daily by nursing  - Appreciate ID for assistance with abx- L stump cultures growing out enterobacter cloacae  IV cefepime  - Right AKA wound VAC changed  Wound measurements 15 cm x 4 cm x 2 cm  Continue wound VAC therapy    Change MWF  - Incentive spirometer   - Medical management with statin, Plavix  - Continue coumadin  - Medical management and glucose control per SLIM  - Continue oral pain regimen, adjusted to long acting which appears to be controlling patient's pain well   - Appreciate psych input and management - addition of wellbutrin and adjustment of zoloft   - Disposition planning for rehab    Subjective: No new concerns this morning  Continues with mild stump pain bilaterally  Vitals:  /85 (BP Location: Left arm)   Pulse 102   Temp (!) 97 2 °F (36 2 °C) (Temporal)   Resp 20   Ht 5' 4" (1 626 m)   Wt 70 kg (154 lb 5 2 oz)   SpO2 90%   BMI 26 49 kg/m²     I/Os:  I/O last 3 completed shifts: In: 990 [P O :840; IV Piggyback:150]  Out: 1275 [Urine:1000; Drains:275]  I/O this shift:  In: 240 [P O :240]  Out: 1100 [Urine:1100]    Lab Results and Cultures:   CBC with diff:   Lab Results   Component Value Date    WBC 4 97 12/24/2022    HGB 7 9 (L) 12/24/2022    HCT 26 1 (L) 12/24/2022    MCV 86 12/24/2022     12/24/2022    MCH 26 1 (L) 12/24/2022    MCHC 30 3 (L) 12/24/2022    RDW 16 4 (H) 12/24/2022    MPV 8 6 (L) 12/24/2022    NRBC 0 12/24/2022   ,   BMP/CMP:  Lab Results   Component Value Date    SODIUM 141 12/24/2022    K 3 6 12/24/2022     12/24/2022    CO2 29 12/24/2022    BUN 8 12/24/2022    CREATININE 0 65 12/24/2022    CALCIUM 8 1 (L) 12/24/2022    AST 27 12/22/2022    ALT 13 12/22/2022    ALKPHOS 167 (H) 12/22/2022    EGFR 96 12/24/2022   ,   Lipid Panel: No results found for: CHOL,   Coags:   Lab Results   Component Value Date    PTT 95 (H) 12/12/2022    INR 2 09 (H) 12/23/2022   ,     Blood Culture:   Lab Results   Component Value Date    BLOODCX No Growth After 5 Days   11/13/2022    BLOODCX Staphylococcus coagulase negative (A) 11/13/2022   ,   Urinalysis:   Lab Results   Component Value Date    COLORU Yellow 11/12/2022    CLARITYU Slightly Cloudy 11/12/2022    SPECGRAV 1 025 11/12/2022    PHUR 5 5 11/12/2022    PHUR 6 5 02/23/2018    LEUKOCYTESUR Trace (A) 11/12/2022    NITRITE Positive (A) 11/12/2022    GLUCOSEU Negative 11/12/2022    KETONESU Negative 11/12/2022    BILIRUBINUR Negative 11/12/2022    BLOODU Large (A) 11/12/2022   ,   Urine Culture:   Lab Results   Component Value Date    URINECX >100,000 cfu/ml Enterobacter cloacae complex (A) 11/12/2022    URINECX 70,000-79,000 cfu/ml Kluyveromyces marxianus (A) 11/12/2022   ,   Wound Culure: No results found for: WOUNDCULT    Medications:  Current Facility-Administered Medications   Medication Dose Route Frequency   • acetaminophen (TYLENOL) tablet 650 mg  650 mg Oral Q6H PRN   • ALPRAZolam (XANAX) tablet 0 25 mg  0 25 mg Oral Daily PRN   • aluminum-magnesium hydroxide-simethicone (MYLANTA) oral suspension 30 mL  30 mL Oral Q4H PRN   • amLODIPine (NORVASC) tablet 10 mg  10 mg Oral Daily   • atorvastatin (LIPITOR) tablet 40 mg  40 mg Oral Daily With Dinner   • buPROPion (WELLBUTRIN XL) 24 hr tablet 150 mg  150 mg Oral Daily   • clopidogrel (PLAVIX) tablet 75 mg  75 mg Oral Daily   • collagenase (SANTYL) ointment   Topical Daily   • doxazosin (CARDURA) tablet 4 mg  4 mg Oral HS   • doxycycline hyclate (VIBRAMYCIN) capsule 100 mg  100 mg Oral Q12H [facility]   • ferrous sulfate tablet 325 mg  325 mg Oral Daily With Lunch   • gabapentin (NEURONTIN) capsule 400 mg  400 mg Oral TID   • insulin glargine (LANTUS) subcutaneous injection 10 Units 0 1 mL  10 Units Subcutaneous HS   • insulin lispro (HumaLOG) 100 units/mL subcutaneous injection 1-6 Units  1-6 Units Subcutaneous TID AC   • labetalol (NORMODYNE) injection 10 mg  10 mg Intravenous Q6H PRN   • lisinopril (ZESTRIL) tablet 40 mg  40 mg Oral Daily   • methocarbamol (ROBAXIN) tablet 1,000 mg  1,000 mg Oral Q8H [facility]   • metoclopramide (REGLAN) injection 10 mg  10 mg Intravenous Q6H PRN   • metoprolol succinate (TOPROL-XL) 24 hr tablet 50 mg  50 mg Oral Daily   • naloxone (NARCAN) 0 04 mg/mL syringe 0 04 mg  0 04 mg Intravenous Q1MIN PRN   • nystatin (MYCOSTATIN) powder   Topical BID   • ondansetron (ZOFRAN) injection 4 mg  4 mg Intravenous Q6H PRN   • oxyCODONE (OxyCONTIN) 12 hr tablet 10 mg  10 mg Oral Q12H [facility] long term   • oxyCODONE (ROXICODONE) IR tablet 2 5 mg  2 5 mg Oral Q4H PRN    Or   • oxyCODONE (ROXICODONE) IR tablet 5 mg  5 mg Oral Q4H PRN    Or   • oxyCODONE (ROXICODONE) IR tablet 7 5 mg  7 5 mg Oral Q4H PRN   • pantoprazole (PROTONIX) EC tablet 40 mg  40 mg Oral Early Morning   • polyethylene glycol (MIRALAX) packet 17 g  17 g Oral BID   • potassium chloride (K-DUR,KLOR-CON) CR tablet 20 mEq  20 mEq Oral Daily   • QUEtiapine (SEROquel) tablet 25 mg  25 mg Oral HS   • senna (SENOKOT) tablet 8 6 mg  1 tablet Oral BID   • sertraline (ZOLOFT) tablet 175 mg  175 mg Oral Daily   • warfarin (COUMADIN) tablet 4 mg  4 mg Oral Daily (warfarin)       Imaging:  Reviewed  Physical Exam:    General: Alert and oriented, lying comfortably in bed  CV: Regular rate  Respiratory: Normal effort on room air  Abdominal: Soft, nondistended  Extremities: Bilateral AKA stumps, mildly tender to palpation, wound VAC in place on right  Neurologic:  At baseline      Haven Barrera MD  12/24/2022

## 2022-12-24 NOTE — PROGRESS NOTES
[address] [facility]  [facility] Note [facility] [facility] 1960, 65 y o  female MRN: [id number]  Unit/Bed#: E4 -01 Encounter: [id number]  Primary Care Provider: ALEX Carreno   Date and time admitted to hospital: 10/21/2022  7:58 PM    Febrile  Assessment & Plan  Intermittent fever; but currently afebrile x > 48 hours  UA positive, but likely contaminant rather than an actual urinary tract infection  Possibly post op fever  · Continue to have fever; wound cultures from recent wound washout with enterobacteria  · ID recommending 10 days of cefepime twice daily      HIT (heparin-induced thrombocytopenia)  Assessment & Plan  · Positive heparin antibody on 11/11/2022  · Completed 5 days of overlap with argatroban and Coumadin   · Continue coumadin for minimum of 4 weeks  · INR therapeutic; however appears to decline on 3 mg daily; have increased to 4 mg daily  · Goal INR 2-3    Diabetic ulcer of heel associated with diabetes mellitus due to underlying condition [facility])  Assessment & Plan  S/p left aka 11/23/2022  · Post-op wound management per vascular surgery  · Await next step in management from vascular surgery, underwent surgical washout of right stump debridement of left stump on 12/19  · Pain appears well controlled on OxyContin 10 twice daily and gabapentin 400mg tid  · As needed pain medications taken sparingly    Acute on chronic anemia  Assessment & Plan  · She has had multiple  blood transfusions during this admission  · Last blood transfusion was on  12/15/2022 prior to her surgery    · Monitor  · Hemoglobin continues to decline, FOBT negative  · Review of recent iron panel appears to show picture of anemia of chronic disease  · Additionally, iron noted to be low, will initiate iron supplementation  · 12/24-appears to be stable at 7 9, monitor    Type 2 diabetes mellitus with hyperglycemia, with long-term current use of insulin [facility])  Assessment & Plan  Lab Results Component Value Date    HGBA1C 9 8 (H) 10/25/2022     Recent Labs     12/23/22  2346 12/24/22  0730 12/24/22  1152 12/24/22  1616   POCGLU 119 97 167* 149*     Significantly reduced regimen from home due to poor oral intake  Goal sugar < 180  Continue Lantus to 10 units at night  Continue sliding scale insulin    Stroke [facility])  Assessment & Plan  · Noted to have change in mental status on 11/13/2022  · CT confirmed moderate right parietal lobe stroke, likely embolic in the setting of HIT with thrombosis  · She was treated with argatroban/coumadin bridge  · INR therapeutic  · Goal INR 2-3    Benign essential hypertension  Assessment & Plan  Continue amlodipine 10 mg daily, doxazosin 4 mg at bedtime, metoprolol 25 mg daily, lisinopril 40 mg daily with hold parameters  12/23-blood pressure remains elevated, unclear from underlying pain given recent surgery or if uncontrolled on current medications now the patient's health is generally improved  - have increased metoprolol to 50 mg daily; as needed labetalol for SBP greater than 170    Depression, recurrent (HCC)  Assessment & Plan  Evaluated by Psychiatry x 2 on this admission  ·  continue Zoloft 175 mg daily, Wellbutrin 150 mg daily, Seroquel 25 mg at bedtime  * PAD (peripheral artery disease) ([facility] [address] )  Assessment & Plan  · She was initially admitted for nonhealing wound of the left lower extremity, requiring multiple endovascular interventions and ultimately underwent left AKA on 11/23/2022 and right AKA on 12/1/22  · S/p right stump debridement and vac placement 12/16/22  · For additional right stump washout and VAC exchange today- 12/19/2022  per surgery    · Continue pain control  · Eventual short-term rehab placement when medically stable  · Management per primary service      VTE Pharmacologic Prophylaxis:   Pharmacologic: Warfarin (Coumadin)  Mechanical VTE Prophylaxis in Place: No    Patient Centered Rounds: I have performed bedside rounds with nursing staff today     Discussions with Specialists or Other Care Team Provider:     Education and Discussions with Family / Patient: Care plan discussed with patient who voiced understanding and agrees with recommendations  Time Spent for Care: 45 minutes  More than 50% of total time spent on counseling and coordination of care as described above  Current Length of Stay: 61 day(s)    Current Patient Status: Inpatient   Certification Statement: The patient will continue to require additional inpatient hospital stay due to Treatment of peripheral vascular disease    Discharge Plan: As per primary team    Code Status: Level 1 - Full Code      Subjective:   Patient seen and examined at bedside, overall appears much improved  Blood pressure improved and will test INR tomorrow  Pain seems well controlled and patient's overall mood and affect appear improved as well  Globin improving with iron supplementation; blood glucose appears at goal   Initially started on cefepime following most recent washout; however Enterobacter is resistant to cefepime  Started on doxycycline on ; complete 10 days of treatment  Objective:     Vitals:   Temp (24hrs), Av 8 °F (36 6 °C), Min:97 1 °F (36 2 °C), Max:99 1 °F (37 3 °C)    Temp:  [97 1 °F (36 2 °C)-99 1 °F (37 3 °C)] 99 1 °F (37 3 °C)  HR:  [] 83  Resp:  [20] 20  BP: (142-199)/(53-90) 168/70  SpO2:  [90 %-95 %] 95 %  Body mass index is 26 49 kg/m²  Input and Output Summary (last 24 hours): Intake/Output Summary (Last 24 hours) at 2022 1703  Last data filed at 2022 1253  Gross per 24 hour   Intake 240 ml   Output 2700 ml   Net -2460 ml       Physical Exam:     Physical Exam  Vitals and nursing note reviewed  Constitutional:       General: She is not in acute distress  Appearance: She is well-developed  HENT:      Head: Normocephalic and atraumatic     Eyes:      Conjunctiva/sclera: Conjunctivae normal    Cardiovascular:      Rate and Rhythm: Normal rate  Heart sounds: No murmur heard  Pulmonary:      Effort: Pulmonary effort is normal  No respiratory distress  Abdominal:      Palpations: Abdomen is soft  Tenderness: There is no abdominal tenderness  Musculoskeletal:         General: Tenderness and deformity present  No swelling  Cervical back: Neck supple  Comments: Bilateral AKA  PICC line   Skin:     General: Skin is warm and dry  Neurological:      Mental Status: She is alert and oriented to person, place, and time  Psychiatric:         Mood and Affect: Mood normal          Behavior: Behavior normal            Additional Data:     Labs:    Results from last 7 days   Lab Units 12/24/22  0459   WBC Thousand/uL 4 97   HEMOGLOBIN g/dL 7 9*   HEMATOCRIT % 26 1*   PLATELETS Thousands/uL 227   NEUTROS PCT % 66   LYMPHS PCT % 21   MONOS PCT % 9   EOS PCT % 3     Results from last 7 days   Lab Units 12/24/22  0459 12/23/22  0656 12/22/22  0539   SODIUM mmol/L 141   < > 139   POTASSIUM mmol/L 3 6   < > 3 6   CHLORIDE mmol/L 104   < > 103   CO2 mmol/L 29   < > 28   BUN mg/dL 8   < > 10   CREATININE mg/dL 0 65   < > 0 65   ANION GAP mmol/L 8   < > 8   CALCIUM mg/dL 8 1*   < > 7 6*   ALBUMIN g/dL  --   --  1 4*   TOTAL BILIRUBIN mg/dL  --   --  0 27   ALK PHOS U/L  --   --  167*   ALT U/L  --   --  13   AST U/L  --   --  27   GLUCOSE RANDOM mg/dL 94   < > 109    < > = values in this interval not displayed  Results from last 7 days   Lab Units 12/23/22  0656   INR  2 09*     Results from last 7 days   Lab Units 12/24/22  1616 12/24/22  1152 12/24/22  0730 12/23/22  2346 12/23/22  2138 12/23/22  1554 12/23/22  1152 12/23/22  0751 12/22/22  2050 12/22/22  1627 12/22/22  1134 12/22/22  0729   POC GLUCOSE mg/dl 149* 167* 97 119 141* 149* 124 104 181* 121 103 105         Results from last 7 days   Lab Units 12/20/22  0606   PROCALCITONIN ng/ml 0 08           * I Have Reviewed All Lab Data Listed Above    * Additional Pertinent Lab Tests Reviewed:  All Labs Within Last 24 Hours Reviewed    Imaging:    Imaging Reports Reviewed Today Include:   Imaging Personally Reviewed by Myself Includes:      Recent Cultures (last 7 days):     Results from last 7 days   Lab Units 12/19/22  1214   GRAM STAIN RESULT  Rare Polys  No bacteria seen       Last 24 Hours Medication List:   Current Facility-Administered Medications   Medication Dose Route Frequency Provider Last Rate   • acetaminophen  650 mg Oral Q6H PRN Josse Henry, DO     • ALPRAZolam  0 25 mg Oral Daily PRN Josse Henry, DO     • aluminum-magnesium hydroxide-simethicone  30 mL Oral Q4H PRN Josse Henry, DO     • amLODIPine  10 mg Oral Daily Josse Henry, DO     • atorvastatin  40 mg Oral Daily With [address], DO     • buPROPion  150 mg Oral Daily Josse Henry, DO     • clopidogrel  75 mg Oral Daily Josse Henry, DO     • collagenase   Topical Daily Jossedmitri Henry, DO     • doxazosin  4 mg Oral HS Josse Henry, DO     • doxycycline hyclate  100 mg Oral Q12H Sagrario Paul MD     • ferrous sulfate  325 mg Oral Daily With Lunch Calogero Reba, DO     • gabapentin  400 mg Oral TID Josse Henry, DO     • insulin glargine  10 Units Subcutaneous HS Josse Henry, DO     • insulin lispro  1-6 Units Subcutaneous TID AC Josse Henry, DO     • labetalol  10 mg Intravenous Q6H PRN Josse Henry, DO     • lisinopril  40 mg Oral Daily Josse Henry, DO     • methocarbamol  1,000 mg Oral Counts include [facility] beds at the [facility] Joses Henry, DO     • metoclopramide  10 mg Intravenous Q6H PRN Josse Henry, DO     • metoprolol succinate  50 mg Oral Daily Chris Farfan MD     • naloxone  0 04 mg Intravenous Q1MIN PRN Josse Henry, DO     • nystatin   Topical BID Josse Henry, DO     • ondansetron  4 mg Intravenous Q6H PRN Josse Henry, DO     • oxyCODONE  10 mg Oral Q12H [address], DO • oxyCODONE  2 5 mg Oral Q4H PRN Janeen Bame, DO      Or   • oxyCODONE  5 mg Oral Q4H PRN Janeen Bame, DO      Or   • oxyCODONE  7 5 mg Oral Q4H PRN Janeen Bame, DO     • pantoprazole  40 mg Oral Early Morning Janeen Bame, DO     • polyethylene glycol  17 g Oral BID Janeen Bame, DO     • potassium chloride  20 mEq Oral Daily Mackenzie Thompson MD     • QUEtiapine  25 mg Oral HS Janeen Bame, DO     • senna  1 tablet Oral BID Janeen Bame, DO     • sertraline  175 mg Oral Daily Janeen Bame, DO     • warfarin  4 mg Oral Daily (warfarin) Mackenzie Thompson MD          Today, Patient Was Seen By: Theodora Hermosillo MD    ** Please Note: Dictation voice to text software may have been used in the creation of this document   **

## 2022-12-25 LAB
GLUCOSE SERPL-MCNC: 101 MG/DL (ref 65–140)
GLUCOSE SERPL-MCNC: 110 MG/DL (ref 65–140)
GLUCOSE SERPL-MCNC: 114 MG/DL (ref 65–140)
GLUCOSE SERPL-MCNC: 138 MG/DL (ref 65–140)
INR PPP: 2.25 (ref 0.84–1.19)
PROTHROMBIN TIME: 24.8 SECONDS (ref 11.6–14.5)

## 2022-12-25 PROCEDURE — 85610 PROTHROMBIN TIME: CPT | Performed by: INTERNAL MEDICINE

## 2022-12-25 PROCEDURE — 99233 SBSQ HOSP IP/OBS HIGH 50: CPT | Performed by: INTERNAL MEDICINE

## 2022-12-25 PROCEDURE — 82948 REAGENT STRIP/BLOOD GLUCOSE: CPT

## 2022-12-25 PROCEDURE — 99024 POSTOP FOLLOW-UP VISIT: CPT | Performed by: SURGERY

## 2022-12-25 RX ORDER — METOPROLOL SUCCINATE 100 MG/1
100 TABLET, EXTENDED RELEASE ORAL DAILY
Status: DISCONTINUED | OUTPATIENT
Start: 2022-12-25 | End: 2023-01-12 | Stop reason: HOSPADM

## 2022-12-25 RX ADMIN — ALTEPLASE 2 MG: 2.2 INJECTION, POWDER, LYOPHILIZED, FOR SOLUTION INTRAVENOUS at 11:17

## 2022-12-25 RX ADMIN — OXYCODONE HYDROCHLORIDE 10 MG: 10 TABLET, FILM COATED, EXTENDED RELEASE ORAL at 08:26

## 2022-12-25 RX ADMIN — GABAPENTIN 400 MG: 400 CAPSULE ORAL at 22:16

## 2022-12-25 RX ADMIN — LISINOPRIL 40 MG: 20 TABLET ORAL at 08:27

## 2022-12-25 RX ADMIN — METHOCARBAMOL 1000 MG: 500 TABLET ORAL at 22:16

## 2022-12-25 RX ADMIN — DOXAZOSIN 4 MG: 4 TABLET ORAL at 22:16

## 2022-12-25 RX ADMIN — POTASSIUM CHLORIDE 20 MEQ: 1500 TABLET, EXTENDED RELEASE ORAL at 08:27

## 2022-12-25 RX ADMIN — METHOCARBAMOL 1000 MG: 500 TABLET ORAL at 14:59

## 2022-12-25 RX ADMIN — METHOCARBAMOL 1000 MG: 500 TABLET ORAL at 05:20

## 2022-12-25 RX ADMIN — ALTEPLASE 2 MG: 2.2 INJECTION, POWDER, LYOPHILIZED, FOR SOLUTION INTRAVENOUS at 08:25

## 2022-12-25 RX ADMIN — INSULIN GLARGINE 10 UNITS: 100 INJECTION, SOLUTION SUBCUTANEOUS at 22:17

## 2022-12-25 RX ADMIN — PANTOPRAZOLE SODIUM 40 MG: 40 TABLET, DELAYED RELEASE ORAL at 05:20

## 2022-12-25 RX ADMIN — GABAPENTIN 400 MG: 400 CAPSULE ORAL at 08:27

## 2022-12-25 RX ADMIN — AMLODIPINE BESYLATE 10 MG: 10 TABLET ORAL at 08:27

## 2022-12-25 RX ADMIN — ATORVASTATIN CALCIUM 40 MG: 40 TABLET, FILM COATED ORAL at 17:28

## 2022-12-25 RX ADMIN — WARFARIN SODIUM 4 MG: 2 TABLET ORAL at 17:28

## 2022-12-25 RX ADMIN — BUPROPION HYDROCHLORIDE 150 MG: 150 TABLET, FILM COATED, EXTENDED RELEASE ORAL at 08:27

## 2022-12-25 RX ADMIN — QUETIAPINE FUMARATE 25 MG: 25 TABLET ORAL at 22:16

## 2022-12-25 RX ADMIN — SERTRALINE HYDROCHLORIDE 175 MG: 100 TABLET ORAL at 08:26

## 2022-12-25 RX ADMIN — CLOPIDOGREL BISULFATE 75 MG: 75 TABLET ORAL at 08:27

## 2022-12-25 RX ADMIN — METOPROLOL SUCCINATE 100 MG: 100 TABLET, FILM COATED, EXTENDED RELEASE ORAL at 08:27

## 2022-12-25 RX ADMIN — DOXYCYCLINE 100 MG: 100 CAPSULE ORAL at 08:27

## 2022-12-25 RX ADMIN — OXYCODONE HYDROCHLORIDE 10 MG: 10 TABLET, FILM COATED, EXTENDED RELEASE ORAL at 22:16

## 2022-12-25 RX ADMIN — DOXYCYCLINE 100 MG: 100 CAPSULE ORAL at 22:16

## 2022-12-25 RX ADMIN — ALUMINUM HYDROXIDE, MAGNESIUM HYDROXIDE, AND SIMETHICONE 30 ML: 200; 200; 20 SUSPENSION ORAL at 11:15

## 2022-12-25 RX ADMIN — GABAPENTIN 400 MG: 400 CAPSULE ORAL at 17:28

## 2022-12-25 NOTE — ASSESSMENT & PLAN NOTE
· She was initially admitted for nonhealing wound of the left lower extremity, requiring multiple endovascular interventions and ultimately underwent left AKA on 11/23/2022 and right AKA on 12/1/22  · S/p right stump debridement and vac placement 12/16/22  · For additional right stump washout and VAC exchange- 12/19/2022  per surgery    · Continue pain control  · Eventual short-term rehab placement when medically stable  · Management per primary service

## 2022-12-25 NOTE — ASSESSMENT & PLAN NOTE
Intermittent fever; but currently afebrile x > 48 hours  UA positive, but likely contaminant rather than an actual urinary tract infection  Possibly post op fever     · Continue to have fever; wound cultures from recent wound washout with enterobacteria  · ID recommending 10 days of doxycycline twice daily; abx through 1/1/23

## 2022-12-25 NOTE — ASSESSMENT & PLAN NOTE
Continue amlodipine 10 mg daily, doxazosin 4 mg at bedtime, metoprolol 25 mg daily, lisinopril 40 mg daily with hold parameters  12/23-blood pressure remains elevated, unclear from underlying pain given recent surgery or if uncontrolled on current medications now the patient's health is generally improved  - have increased metoprolol to 50 mg daily; as needed labetalol for SBP greater than 170  12/25 - BP remains elevated; metoprolol increased to 100 mg daily

## 2022-12-25 NOTE — PROGRESS NOTES
[address] [facility]  [facility] 1960, 65 y o  female MRN: [id number]  Unit/Bed#: E4 -01 Encounter: [id number]  Primary Care Provider: ALEX Leiva   Date and time admitted to hospital: 10/21/2022  7:58 PM    * PAD (peripheral artery disease) [facility])  Assessment & Plan  65year old female former smoker w/HTN, HLD, uncontrolled type II DM (A1c 9 8), hx CVA, presenting with nonhealing extensive L heel ulceration and R hallux and 5th digit ulceration  Vascular surgery consulted for input  S/p multiple b/l endovascular interventions  JUSTIN  R hemispheric CVA    S/p L AKA 11/23/22 [facility])  S/p R AKA, wound debridement 12/1/22 [facility])  S/p R AKA wound/stump washout and VAC placement 12/16/22 Mara Sadler  S/p R AKA wound/stump VAC change, L AKA stump washout/packing 12/19/22 (Celestino)      Wound Cx: enterobacter cloacae     Recommendations:  - Bilateral tissue loss in the setting of uncontrolled type II DM and NYDIA on admission, now s/p multiple angiograms w/intervention    - Endovascular interventions, c/b atheroembolic event to the RLE and JUSTIN with R hemispheric CVA  - Now s/p L AKA on 11/23/22 and R AKA 12/1/22 w/ proximal thigh wound debridements    - R AKA stump with incisional dehiscence and thigh wounds requiring debridement  Taken to the OR on 12/16 for washout and VAC placement with return to the OR 12/19 for VAC change  L AKA stump with some drainage, multiple staples removed from the incision and iodoform 1' packing placed in small opening  - Packing to be changed daily by nursing  - Appreciate ID for assistance with abx- L stump cultures growing out enterobacter cloacae  PO doxycycline 100 mg BID for 10 days total  course  - Right AKA wound VAC changed  Wound measurements 15 cm x 4 cm x 2 cm  Continue wound VAC therapy    Change MWF  - Incentive spirometer   - Medical management with statin, Plavix  - Continue coumadin  - Medical management and glucose control per SLIM  - Continue oral pain regimen, adjusted to long acting which appears to be controlling patient's pain well   - Appreciate psych input and management - addition of wellbutrin and adjustment of zoloft   - Disposition planning for rehab-possible dc on Monday    Subjective: No new concerns this morning  Pain is controlled  Tolerating a diet  Changed LLE packing and flushed with saline  Vitals:  BP (!) 184/79 (BP Location: Left arm)   Pulse 78   Temp 98 2 °F (36 8 °C) (Temporal)   Resp 20   Ht 5' 4" (1 626 m)   Wt 85 5 kg (188 lb 7 9 oz)   SpO2 95%   BMI 32 35 kg/m²     I/Os:  I/O last 3 completed shifts: In: 240 [P O :240]  Out: 1750 [Urine:1700; Drains:50]  No intake/output data recorded  Lab Results and Cultures:   CBC with diff:   Lab Results   Component Value Date    WBC 4 97 12/24/2022    HGB 7 9 (L) 12/24/2022    HCT 26 1 (L) 12/24/2022    MCV 86 12/24/2022     12/24/2022    MCH 26 1 (L) 12/24/2022    MCHC 30 3 (L) 12/24/2022    RDW 16 4 (H) 12/24/2022    MPV 8 6 (L) 12/24/2022    NRBC 0 12/24/2022   ,   BMP/CMP:  Lab Results   Component Value Date    SODIUM 141 12/24/2022    K 3 6 12/24/2022     12/24/2022    CO2 29 12/24/2022    BUN 8 12/24/2022    CREATININE 0 65 12/24/2022    CALCIUM 8 1 (L) 12/24/2022    AST 27 12/22/2022    ALT 13 12/22/2022    ALKPHOS 167 (H) 12/22/2022    EGFR 96 12/24/2022   ,   Lipid Panel: No results found for: CHOL,   Coags:   Lab Results   Component Value Date    PTT 95 (H) 12/12/2022    INR 2 09 (H) 12/23/2022   ,     Blood Culture:   Lab Results   Component Value Date    BLOODCX No Growth After 5 Days   11/13/2022    BLOODCX Staphylococcus coagulase negative (A) 11/13/2022   ,   Urinalysis:   Lab Results   Component Value Date    COLORU Yellow 11/12/2022    CLARITYU Slightly Cloudy 11/12/2022    SPECGRAV 1 025 11/12/2022    PHUR 5 5 11/12/2022    PHUR 6 5 02/23/2018    LEUKOCYTESUR Trace (A) 11/12/2022    NITRITE Positive (A) 11/12/2022    GLUCOSEU Negative 11/12/2022    KETONESU Negative 11/12/2022    BILIRUBINUR Negative 11/12/2022    BLOODU Large (A) 11/12/2022   ,   Urine Culture:   Lab Results   Component Value Date    URINECX >100,000 cfu/ml Enterobacter cloacae complex (A) 11/12/2022    URINECX 70,000-79,000 cfu/ml Kluyveromyces marxianus (A) 11/12/2022   ,   Wound Culure: No results found for: WOUNDCULT    Medications:  Current Facility-Administered Medications   Medication Dose Route Frequency   • acetaminophen (TYLENOL) tablet 650 mg  650 mg Oral Q6H PRN   • ALPRAZolam (XANAX) tablet 0 25 mg  0 25 mg Oral Daily PRN   • aluminum-magnesium hydroxide-simethicone (MYLANTA) oral suspension 30 mL  30 mL Oral Q4H PRN   • amLODIPine (NORVASC) tablet 10 mg  10 mg Oral Daily   • atorvastatin (LIPITOR) tablet 40 mg  40 mg Oral Daily With Dinner   • buPROPion (WELLBUTRIN XL) 24 hr tablet 150 mg  150 mg Oral Daily   • clopidogrel (PLAVIX) tablet 75 mg  75 mg Oral Daily   • collagenase (SANTYL) ointment   Topical Daily   • doxazosin (CARDURA) tablet 4 mg  4 mg Oral HS   • doxycycline hyclate (VIBRAMYCIN) capsule 100 mg  100 mg Oral Q12H [address]   • ferrous sulfate tablet 325 mg  325 mg Oral Daily With Lunch   • gabapentin (NEURONTIN) capsule 400 mg  400 mg Oral TID   • insulin glargine (LANTUS) subcutaneous injection 10 Units 0 1 mL  10 Units Subcutaneous HS   • insulin lispro (HumaLOG) 100 units/mL subcutaneous injection 1-6 Units  1-6 Units Subcutaneous TID AC   • labetalol (NORMODYNE) injection 10 mg  10 mg Intravenous Q6H PRN   • lisinopril (ZESTRIL) tablet 40 mg  40 mg Oral Daily   • methocarbamol (ROBAXIN) tablet 1,000 mg  1,000 mg Oral Q8H [address]   • metoclopramide (REGLAN) injection 10 mg  10 mg Intravenous Q6H PRN   • metoprolol succinate (TOPROL-XL) 24 hr tablet 100 mg  100 mg Oral Daily   • naloxone (NARCAN) 0 04 mg/mL syringe 0 04 mg  0 04 mg Intravenous Q1MIN PRN   • nystatin (MYCOSTATIN) powder   Topical BID   • ondansetron (ZOFRAN) injection 4 mg  4 mg Intravenous Q6H PRN   • oxyCODONE (OxyCONTIN) 12 hr tablet 10 mg  10 mg Oral Q12H [address]   • oxyCODONE (ROXICODONE) IR tablet 2 5 mg  2 5 mg Oral Q4H PRN    Or   • oxyCODONE (ROXICODONE) IR tablet 5 mg  5 mg Oral Q4H PRN    Or   • oxyCODONE (ROXICODONE) IR tablet 7 5 mg  7 5 mg Oral Q4H PRN   • pantoprazole (PROTONIX) EC tablet 40 mg  40 mg Oral Early Morning   • polyethylene glycol (MIRALAX) packet 17 g  17 g Oral BID   • potassium chloride (K-DUR,KLOR-CON) CR tablet 20 mEq  20 mEq Oral Daily   • QUEtiapine (SEROquel) tablet 25 mg  25 mg Oral HS   • senna (SENOKOT) tablet 8 6 mg  1 tablet Oral BID   • sertraline (ZOLOFT) tablet 175 mg  175 mg Oral Daily   • warfarin (COUMADIN) tablet 4 mg  4 mg Oral Daily (warfarin)       Imaging:  Reviewed  Physical Exam  Vitals reviewed  Constitutional:       Appearance: She is not ill-appearing or diaphoretic  Pulmonary:      Effort: Pulmonary effort is normal    Musculoskeletal:      Comments: RLE wound vac in place  LLE intact with small defect with some purulent drainage on packing  Removed and flushed with saline  Right Lower Extremity: Right leg is amputated above knee  Left Lower Extremity: Left leg is amputated above knee  Neurological:      Mental Status: She is alert             Carrie Dominguez MD  12/25/2022

## 2022-12-25 NOTE — ASSESSMENT & PLAN NOTE
Lab Results   Component Value Date    HGBA1C 9 8 (H) 10/25/2022     Recent Labs     12/24/22  1152 12/24/22  1616 12/24/22 2054 12/25/22  0722   POCGLU 167* 149* 178* 110     Significantly reduced regimen from home due to poor oral intake  Goal sugar < 180  Continue Lantus to 10 units at night  Continue sliding scale insulin

## 2022-12-25 NOTE — PROGRESS NOTES
[facility] [place]  Progress Note - [address] [facility] [address] 1960, 65 y o  female MRN: [id number]  Unit/Bed#: E4 -01 Encounter: [id number]  Primary Care Provider: ALEX Carlton   Date and time admitted to hospital: 10/21/2022  7:58 PM    Febrile  Assessment & Plan  Intermittent fever; but currently afebrile x > 48 hours  UA positive, but likely contaminant rather than an actual urinary tract infection  Possibly post op fever  · Continue to have fever; wound cultures from recent wound washout with enterobacteria  · ID recommending 10 days of doxycycline twice daily; abx through 1/1/23      HIT (heparin-induced thrombocytopenia)  Assessment & Plan  · Positive heparin antibody on 11/11/2022  · Completed 5 days of overlap with argatroban and Coumadin   · Continue coumadin for minimum of 4 weeks  · INR therapeutic; however appears to decline on 3 mg daily; have increased to 4 mg daily  · Goal INR 2-3    Diabetic ulcer of heel associated with diabetes mellitus due to underlying condition [facility])  Assessment & Plan  S/p left aka 11/23/2022  · Post-op wound management per vascular surgery  · Await next step in management from vascular surgery, underwent surgical washout of right stump debridement of left stump on 12/19  · Pain appears well controlled on OxyContin 10 twice daily and gabapentin 400mg tid  · As needed pain medications taken sparingly    Acute on chronic anemia  Assessment & Plan  · She has had multiple  blood transfusions during this admission  · Last blood transfusion was on  12/15/2022 prior to her surgery    · Monitor  · Hemoglobin continues to decline, FOBT negative  · Review of recent iron panel appears to show picture of anemia of chronic disease  · Additionally, iron noted to be low, will initiate iron supplementation  · 12/24-appears to be stable at 7 9, monitor    Type 2 diabetes mellitus with hyperglycemia, with long-term current use of insulin (HCC)  Assessment & Plan  Lab Results   Component Value Date    HGBA1C 9 8 (H) 10/25/2022     Recent Labs     12/24/22  1152 12/24/22  1616 12/24/22  2054 12/25/22  0722   POCGLU 167* 149* 178* 110     Significantly reduced regimen from home due to poor oral intake  Goal sugar < 180  Continue Lantus to 10 units at night  Continue sliding scale insulin    Stroke [facility])  Assessment & Plan  · Noted to have change in mental status on 11/13/2022  · CT confirmed moderate right parietal lobe stroke, likely embolic in the setting of HIT with thrombosis  · She was treated with argatroban/coumadin bridge  · INR therapeutic  · Goal INR 2-3    Benign essential hypertension  Assessment & Plan  Continue amlodipine 10 mg daily, doxazosin 4 mg at bedtime, metoprolol 25 mg daily, lisinopril 40 mg daily with hold parameters  12/23-blood pressure remains elevated, unclear from underlying pain given recent surgery or if uncontrolled on current medications now the patient's health is generally improved  - have increased metoprolol to 50 mg daily; as needed labetalol for SBP greater than 170  12/25 - BP remains elevated; metoprolol increased to 100 mg daily    Depression, recurrent ([facility] [address] )  Assessment & Plan  Evaluated by Psychiatry x 2 on this admission  ·  continue Zoloft 175 mg daily, Wellbutrin 150 mg daily, Seroquel 25 mg at bedtime  * PAD (peripheral artery disease) ([facility] [address] )  Assessment & Plan  · She was initially admitted for nonhealing wound of the left lower extremity, requiring multiple endovascular interventions and ultimately underwent left AKA on 11/23/2022 and right AKA on 12/1/22  · S/p right stump debridement and vac placement 12/16/22  · For additional right stump washout and VAC exchange- 12/19/2022  per surgery    · Continue pain control  · Eventual short-term rehab placement when medically stable  · Management per primary service        VTE Pharmacologic Prophylaxis:   Pharmacologic: Warfarin (Coumadin)  Mechanical VTE Prophylaxis in Place: Yes    Patient Centered Rounds: I have performed bedside rounds with nursing staff today  Discussions with Specialists or Other Care Team Provider:     Education and Discussions with Family / Patient: Care plan discussed with patient who voiced understanding and agrees with recommendations  Time Spent for Care: 45 minutes  More than 50% of total time spent on counseling and coordination of care as described above  Current Length of Stay: 59 day(s)    Current Patient Status: Inpatient   Certification Statement: The patient will continue to require additional inpatient hospital stay due to Treatment of bilateral AKA    Discharge Plan: As per primary team    Code Status: Level 1 - Full Code      Subjective:   Patient seen and examined bedside, no acute distress or discomfort noted  Blood pressure elevated again today, uptitrated beta-blocker  INR-2 25, therapeutic  Continues antibiotics for wound infection  Objective:     Vitals:   Temp (24hrs), Av 3 °F (36 8 °C), Min:98 2 °F (36 8 °C), Max:98 6 °F (37 °C)    Temp:  [98 2 °F (36 8 °C)-98 6 °F (37 °C)] 98 6 °F (37 °C)  HR:  [78-84] 84  Resp:  [20] 20  BP: (108-184)/(78-93) 108/78  SpO2:  [94 %-95 %] 94 %  Body mass index is 32 35 kg/m²  Input and Output Summary (last 24 hours): Intake/Output Summary (Last 24 hours) at 2022 1709  Last data filed at 2022 1401  Gross per 24 hour   Intake --   Output 1425 ml   Net -1425 ml       Physical Exam:     Physical Exam  Vitals and nursing note reviewed  Constitutional:       General: She is not in acute distress  Appearance: She is well-developed  HENT:      Head: Normocephalic and atraumatic  Eyes:      Conjunctiva/sclera: Conjunctivae normal    Cardiovascular:      Rate and Rhythm: Normal rate  Heart sounds: No murmur heard  Pulmonary:      Effort: Pulmonary effort is normal  No respiratory distress  Abdominal:      Palpations: Abdomen is soft  Tenderness:  There is no abdominal tenderness  Musculoskeletal:         General: Tenderness and deformity present  No swelling  Cervical back: Neck supple  Comments: Bilateral AKA  PICC line   Skin:     General: Skin is warm and dry  Neurological:      Mental Status: She is alert and oriented to person, place, and time  Psychiatric:         Mood and Affect: Mood normal          Behavior: Behavior normal            Additional Data:     Labs:    Results from last 7 days   Lab Units 12/24/22  0459   WBC Thousand/uL 4 97   HEMOGLOBIN g/dL 7 9*   HEMATOCRIT % 26 1*   PLATELETS Thousands/uL 227   NEUTROS PCT % 66   LYMPHS PCT % 21   MONOS PCT % 9   EOS PCT % 3     Results from last 7 days   Lab Units 12/24/22  0459 12/23/22  0656 12/22/22  0539   SODIUM mmol/L 141   < > 139   POTASSIUM mmol/L 3 6   < > 3 6   CHLORIDE mmol/L 104   < > 103   CO2 mmol/L 29   < > 28   BUN mg/dL 8   < > 10   CREATININE mg/dL 0 65   < > 0 65   ANION GAP mmol/L 8   < > 8   CALCIUM mg/dL 8 1*   < > 7 6*   ALBUMIN g/dL  --   --  1 4*   TOTAL BILIRUBIN mg/dL  --   --  0 27   ALK PHOS U/L  --   --  167*   ALT U/L  --   --  13   AST U/L  --   --  27   GLUCOSE RANDOM mg/dL 94   < > 109    < > = values in this interval not displayed  Results from last 7 days   Lab Units 12/25/22  0951   INR  2 25*     Results from last 7 days   Lab Units 12/25/22  1613 12/25/22  1143 12/25/22  0722 12/24/22  2054 12/24/22  1616 12/24/22  1152 12/24/22  0730 12/23/22  2346 12/23/22  2138 12/23/22  1554 12/23/22  1152 12/23/22  0751   POC GLUCOSE mg/dl 114 101 110 178* 149* 167* 97 119 141* 149* 124 104         Results from last 7 days   Lab Units 12/20/22  0606   PROCALCITONIN ng/ml 0 08           * I Have Reviewed All Lab Data Listed Above  * Additional Pertinent Lab Tests Reviewed:  All Labs Within Last 24 Hours Reviewed    Imaging:    Imaging Reports Reviewed Today Include:   Imaging Personally Reviewed by Myself Includes:     Recent Cultures (last 7 days): Results from last 7 days   Lab Units 12/19/22  1214   GRAM STAIN RESULT  Rare Polys  No bacteria seen       Last 24 Hours Medication List:   Current Facility-Administered Medications   Medication Dose Route Frequency Provider Last Rate   • acetaminophen  650 mg Oral Q6H PRN Maddie Locket, DO     • ALPRAZolam  0 25 mg Oral Daily PRN Maddie Locket, DO     • aluminum-magnesium hydroxide-simethicone  30 mL Oral Q4H PRN Maddie Locket, DO     • amLODIPine  10 mg Oral Daily Maddie Locket, DO     • atorvastatin  40 mg Oral Daily With [address], DO     • buPROPion  150 mg Oral Daily Maddie Locket, DO     • clopidogrel  75 mg Oral Daily Maddie Locket, DO     • collagenase   Topical Daily Maddie Locket, DO     • doxazosin  4 mg Oral HS Maddie Locket, DO     • doxycycline hyclate  100 mg Oral Q12H Sagrario Paul MD     • ferrous sulfate  325 mg Oral Daily With Lunch Calogero Reba, DO     • gabapentin  400 mg Oral TID Maddie Locket, DO     • insulin glargine  10 Units Subcutaneous HS Maddie Locket, DO     • insulin lispro  1-6 Units Subcutaneous TID AC Maddie Locket, DO     • labetalol  10 mg Intravenous Q6H PRN Maddie Locket, DO     • lisinopril  40 mg Oral Daily Maddie Locket, DO     • methocarbamol  1,000 mg Oral [facility] Maddie Locket, DO     • metoclopramide  10 mg Intravenous Q6H PRN Maddie Locket, DO     • metoprolol succinate  100 mg Oral Daily Merlinda Park, MD     • naloxone  0 04 mg Intravenous Q1MIN PRN Maddie Locket, DO     • nystatin   Topical BID Maddie Locket, DO     • ondansetron  4 mg Intravenous Q6H PRN Maddie Locket, DO     • oxyCODONE  10 mg Oral Q12H [address] Maddie Locket, DO     • oxyCODONE  2 5 mg Oral Q4H PRN Maddie Locket, DO      Or   • oxyCODONE  5 mg Oral Q4H PRN Maddie Locket, DO      Or   • oxyCODONE  7 5 mg Oral Q4H PRN Maddie Locket, DO     • pantoprazole  40 mg Oral Early Morning Richa Matters, DO     • polyethylene glycol  17 g Oral BID Richa Matters, DO     • potassium chloride  20 mEq Oral Daily Noemi Ross MD     • QUEtiapine  25 mg Oral HS Richa Matters, DO     • senna  1 tablet Oral BID Richa Matters, DO     • sertraline  175 mg Oral Daily Richa Matters, DO     • warfarin  4 mg Oral Daily (warfarin) Noemi Ross MD          Today, Patient Was Seen By: Sahra Torrez MD    ** Please Note: Dictation voice to text software may have been used in the creation of this document   **

## 2022-12-26 LAB
GLUCOSE SERPL-MCNC: 101 MG/DL (ref 65–140)
GLUCOSE SERPL-MCNC: 132 MG/DL (ref 65–140)
GLUCOSE SERPL-MCNC: 154 MG/DL (ref 65–140)
GLUCOSE SERPL-MCNC: 84 MG/DL (ref 65–140)

## 2022-12-26 PROCEDURE — 99232 SBSQ HOSP IP/OBS MODERATE 35: CPT | Performed by: INTERNAL MEDICINE

## 2022-12-26 PROCEDURE — 2W0NX6Z CHANGE PRESSURE DRESSING ON RIGHT UPPER LEG: ICD-10-PCS | Performed by: SURGERY

## 2022-12-26 PROCEDURE — 82948 REAGENT STRIP/BLOOD GLUCOSE: CPT

## 2022-12-26 PROCEDURE — 99024 POSTOP FOLLOW-UP VISIT: CPT | Performed by: SURGERY

## 2022-12-26 RX ORDER — HYDROMORPHONE HCL/PF 1 MG/ML
0.5 SYRINGE (ML) INJECTION ONCE
Status: COMPLETED | OUTPATIENT
Start: 2022-12-26 | End: 2022-12-26

## 2022-12-26 RX ORDER — LIDOCAINE HYDROCHLORIDE 20 MG/ML
JELLY TOPICAL ONCE
Status: COMPLETED | OUTPATIENT
Start: 2022-12-26 | End: 2022-12-26

## 2022-12-26 RX ADMIN — POLYETHYLENE GLYCOL 3350 17 G: 17 POWDER, FOR SOLUTION ORAL at 21:07

## 2022-12-26 RX ADMIN — GABAPENTIN 400 MG: 400 CAPSULE ORAL at 17:29

## 2022-12-26 RX ADMIN — OXYCODONE HYDROCHLORIDE 10 MG: 10 TABLET, FILM COATED, EXTENDED RELEASE ORAL at 08:33

## 2022-12-26 RX ADMIN — GABAPENTIN 400 MG: 400 CAPSULE ORAL at 08:33

## 2022-12-26 RX ADMIN — WARFARIN SODIUM 4 MG: 2 TABLET ORAL at 17:29

## 2022-12-26 RX ADMIN — DOXAZOSIN 4 MG: 4 TABLET ORAL at 21:07

## 2022-12-26 RX ADMIN — LISINOPRIL 40 MG: 20 TABLET ORAL at 08:33

## 2022-12-26 RX ADMIN — LIDOCAINE HYDROCHLORIDE 5 APPLICATION.: 20 JELLY TOPICAL at 09:41

## 2022-12-26 RX ADMIN — OXYCODONE HYDROCHLORIDE 10 MG: 10 TABLET, FILM COATED, EXTENDED RELEASE ORAL at 21:07

## 2022-12-26 RX ADMIN — DOXYCYCLINE 100 MG: 100 CAPSULE ORAL at 08:33

## 2022-12-26 RX ADMIN — HYDROMORPHONE HYDROCHLORIDE 0.5 MG: 1 INJECTION, SOLUTION INTRAMUSCULAR; INTRAVENOUS; SUBCUTANEOUS at 09:41

## 2022-12-26 RX ADMIN — SERTRALINE HYDROCHLORIDE 175 MG: 100 TABLET ORAL at 08:34

## 2022-12-26 RX ADMIN — FERROUS SULFATE TAB 325 MG (65 MG ELEMENTAL FE) 325 MG: 325 (65 FE) TAB at 12:31

## 2022-12-26 RX ADMIN — METOPROLOL SUCCINATE 100 MG: 100 TABLET, FILM COATED, EXTENDED RELEASE ORAL at 08:33

## 2022-12-26 RX ADMIN — QUETIAPINE FUMARATE 25 MG: 25 TABLET ORAL at 21:07

## 2022-12-26 RX ADMIN — DOXYCYCLINE 100 MG: 100 CAPSULE ORAL at 21:07

## 2022-12-26 RX ADMIN — CLOPIDOGREL BISULFATE 75 MG: 75 TABLET ORAL at 08:33

## 2022-12-26 RX ADMIN — OXYCODONE 5 MG: 5 TABLET ORAL at 07:07

## 2022-12-26 RX ADMIN — INSULIN LISPRO 1 UNITS: 100 INJECTION, SOLUTION INTRAVENOUS; SUBCUTANEOUS at 17:30

## 2022-12-26 RX ADMIN — BUPROPION HYDROCHLORIDE 150 MG: 150 TABLET, FILM COATED, EXTENDED RELEASE ORAL at 08:33

## 2022-12-26 RX ADMIN — PANTOPRAZOLE SODIUM 40 MG: 40 TABLET, DELAYED RELEASE ORAL at 06:40

## 2022-12-26 RX ADMIN — POTASSIUM CHLORIDE 20 MEQ: 1500 TABLET, EXTENDED RELEASE ORAL at 08:34

## 2022-12-26 RX ADMIN — INSULIN GLARGINE 10 UNITS: 100 INJECTION, SOLUTION SUBCUTANEOUS at 21:06

## 2022-12-26 RX ADMIN — METHOCARBAMOL 1000 MG: 500 TABLET ORAL at 06:40

## 2022-12-26 RX ADMIN — OXYCODONE 7.5 MG: 5 TABLET ORAL at 18:37

## 2022-12-26 RX ADMIN — ATORVASTATIN CALCIUM 40 MG: 40 TABLET, FILM COATED ORAL at 17:29

## 2022-12-26 RX ADMIN — METHOCARBAMOL 1000 MG: 500 TABLET ORAL at 14:10

## 2022-12-26 RX ADMIN — GABAPENTIN 400 MG: 400 CAPSULE ORAL at 21:06

## 2022-12-26 RX ADMIN — AMLODIPINE BESYLATE 10 MG: 10 TABLET ORAL at 08:33

## 2022-12-26 RX ADMIN — METHOCARBAMOL 1000 MG: 500 TABLET ORAL at 21:06

## 2022-12-26 NOTE — PROGRESS NOTES
[address] [facility]  [facility] Note [facility] 1960, 65 y o  female MRN: [id number]  Unit/Bed#: E4 -01 Encounter: [id number]  Primary Care Provider: ALEX Fraire   Date and time admitted to hospital: 10/21/2022  7:58 PM    * PAD (peripheral artery disease) ([facility] [address] )  Assessment & Plan  · She was initially admitted for nonhealing wound of the left lower extremity, requiring multiple endovascular interventions and ultimately underwent left AKA on 11/23/2022 and right AKA on 12/1/22  · S/p right stump debridement and vac placement 12/16/22  · For additional right stump washout and VAC exchange- 12/19/2022  per surgery  · Continue pain control  · Eventual short-term rehab placement when medically stable  · Management per primary service    Febrile  Assessment & Plan  Intermittent fever; but currently afebrile x > 48 hours  UA positive, but likely contaminant rather than an actual urinary tract infection  Possibly post op fever  · Continue to have fever; wound cultures from recent wound washout with enterobacteria  · ID recommending 10 days of doxycycline twice daily; abx through 1/1/23      HIT (heparin-induced thrombocytopenia)  Assessment & Plan  · Positive heparin antibody on 11/11/2022  · Completed 5 days of overlap with argatroban and Coumadin   · Continue coumadin for minimum of 4 weeks  · INR therapeutic; however appears to decline on 3 mg daily; have increased to 4 mg daily  · Goal INR 2-3    Diabetic ulcer of heel associated with diabetes mellitus due to underlying condition [facility])  Assessment & Plan  S/p left aka 11/23/2022  · Post-op wound management per vascular surgery  · Await next step in management from vascular surgery, underwent surgical washout of right stump debridement of left stump on 12/19  · Pain appears well controlled on OxyContin 10 twice daily and gabapentin 400mg tid    · As needed pain medications taken sparingly    Acute on chronic anemia  Assessment & Plan  · She has had multiple  blood transfusions during this admission  · Last blood transfusion was on  12/15/2022 prior to her surgery  · Monitor  · Hemoglobin continues to decline, FOBT negative  · Review of recent iron panel appears to show picture of anemia of chronic disease  · Additionally, iron noted to be low, will initiate iron supplementation  · 12/24-appears to be stable at 7 9, monitor    Type 2 diabetes mellitus with hyperglycemia, with long-term current use of insulin [facility])  Assessment & Plan  Lab Results   Component Value Date    HGBA1C 9 8 (H) 10/25/2022     Recent Labs     12/25/22  2109 12/26/22  0839 12/26/22  1127 12/26/22  1621   POCGLU 138 101 84 154*     · Significantly reduced regimen from home due to poor oral intake  · Goal sugar < 180  · Continue Lantus to 10 units at night  · Continue sliding scale insulin    Stroke [facility])  Assessment & Plan  · Noted to have change in mental status on 11/13/2022  · CT confirmed moderate right parietal lobe stroke, likely embolic in the setting of HIT with thrombosis  · She was treated with argatroban/coumadin bridge  · INR therapeutic  · Goal INR 2-3    Benign essential hypertension  Assessment & Plan  Continue amlodipine 10 mg daily, doxazosin 4 mg at bedtime, metoprolol 100 mg daily, lisinopril 40 mg daily with hold parameters    Depression, recurrent (HCC)  Assessment & Plan  Evaluated by Psychiatry x 2 on this admission  ·  continue Zoloft 175 mg daily, Wellbutrin 150 mg daily, Seroquel 25 mg at bedtime  VTE Pharmacologic Prophylaxis:   Pharmacologic: warfarin    Patient Centered Rounds: I have performed bedside rounds with nursing staff today  Time Spent for Care: 20 minutes  More than 50% of total time spent on counseling and coordination of care as described above      Current Length of Stay: 65 day(s)    Current Patient Status: Inpatient     Code Status: Level 1 - Full Code      Subjective:   Patient seen and examined at bedside, denies any chest pain, abdominal pain, nausea or vomiting    Objective:     Vitals:   Temp (24hrs), Av °F (36 7 °C), Min:97 7 °F (36 5 °C), Max:98 2 °F (36 8 °C)    Temp:  [97 7 °F (36 5 °C)-98 2 °F (36 8 °C)] 97 7 °F (36 5 °C)  HR:  [80-85] 85  Resp:  [18] 18  BP: (162-181)/(67-79) 162/67  SpO2:  [93 %-96 %] 93 %  Body mass index is 25 16 kg/m²  Input and Output Summary (last 24 hours): Intake/Output Summary (Last 24 hours) at 2022 1737  Last data filed at 2022 1820  Gross per 24 hour   Intake --   Output 0 ml   Net 0 ml       Physical Exam:    Constitutional: Patient is oriented to person, place and time, no acute distress  HEENT:  Normocephalic, atraumatic  Cardiovascular: Normal S1S2, RRR, No murmurs/rubs/gallops appreciated  Pulmonary:  Bilateral air entry, No rhonchi/rales/wheezing appreciated  Abdominal: Soft, Bowel sounds present, Non-tender, Non-distended  Extremities: Bilateral AKA, right lower extremity with wound VAC in place  Neurological: Cranial nerves II-XII grossly intact, sensation intact, otherwise no focal neurological symptoms  Skin:  Warm, dry    Additional Data:     Labs:    Results from last 7 days   Lab Units 22  0459   WBC Thousand/uL 4 97   HEMOGLOBIN g/dL 7 9*   HEMATOCRIT % 26 1*   PLATELETS Thousands/uL 227   NEUTROS PCT % 66   LYMPHS PCT % 21   MONOS PCT % 9   EOS PCT % 3     Results from last 7 days   Lab Units 22  0459 22  0656 22  0539   POTASSIUM mmol/L 3 6   < > 3 6   CHLORIDE mmol/L 104   < > 103   CO2 mmol/L 29   < > 28   BUN mg/dL 8   < > 10   CREATININE mg/dL 0 65   < > 0 65   CALCIUM mg/dL 8 1*   < > 7 6*   ALK PHOS U/L  --   --  167*   ALT U/L  --   --  13   AST U/L  --   --  27    < > = values in this interval not displayed  Results from last 7 days   Lab Units 22  0951   INR  2 25*        I Have Reviewed All Lab Data Listed Above      Invasive Devices     Peripherally Inserted Central Catheter Line  Duration           PICC Line 80/31/07 Right Basilic 38 days          Drain  Duration           External Urinary Catheter 3 days                     Recent Cultures (last 7 days):           Last 24 Hours Medication List:   Current Facility-Administered Medications   Medication Dose Route Frequency Provider Last Rate   • acetaminophen  650 mg Oral Q6H PRN Marcelo Jeter, DO     • ALPRAZolam  0 25 mg Oral Daily PRN Marcelo Jeter, DO     • aluminum-magnesium hydroxide-simethicone  30 mL Oral Q4H PRN Marcelo Jeter, DO     • amLODIPine  10 mg Oral Daily Marcelo Jeter, DO     • atorvastatin  40 mg Oral Daily With [address] [facility] [address], DO     • buPROPion  150 mg Oral Daily Marcelo Jeter, DO     • clopidogrel  75 mg Oral Daily Marcelo Jeter, DO     • collagenase   Topical Daily Marcelo Jeter, DO     • doxazosin  4 mg Oral HS Marcelo Jeter, DO     • doxycycline hyclate  100 mg Oral Q12H Sagrario Paul MD     • ferrous sulfate  325 mg Oral Daily With Lunch Calogero Reba, DO     • gabapentin  400 mg Oral TID Marcelo Jeter, DO     • insulin glargine  10 Units Subcutaneous HS Marcelo Jeter, DO     • insulin lispro  1-6 Units Subcutaneous TID AC Marcelo Jeter, DO     • labetalol  10 mg Intravenous Q6H PRN Marcelo Jeter, DO     • lisinopril  40 mg Oral Daily Marcelo Jeter, DO     • methocarbamol  1,000 mg Oral [facility] Marcelo Jeter, DO     • metoclopramide  10 mg Intravenous Q6H PRN Marcelo Jeter, DO     • metoprolol succinate  100 mg Oral Daily Ryann Silverman MD     • naloxone  0 04 mg Intravenous Q1MIN PRN Marcelo Jeter, DO     • nystatin   Topical BID Marcelo Jeter, DO     • ondansetron  4 mg Intravenous Q6H PRN Marcelo Jeter, DO     • oxyCODONE  10 mg Oral Q12H [address] Marcelo Jeter, DO     • oxyCODONE  2 5 mg Oral Q4H PRN Marcelo Jeter, DO      Or   • oxyCODONE  5 mg Oral Q4H PRN Gale Ye Nadira Camarillo DO      Or   • oxyCODONE  7 5 mg Oral Q4H PRN Marchneva Turner DO     • pantoprazole  40 mg Oral Early Morning Marchneva Turner DO     • polyethylene glycol  17 g Oral BID Marchneva Turner DO     • potassium chloride  20 mEq Oral Daily Willis Pretty MD     • QUEtiapine  25 mg Oral HS Marchneva Turner DO     • senna  1 tablet Oral BID Marchneva Turner DO     • sertraline  175 mg Oral Daily Marchneva Turner DO     • warfarin  4 mg Oral Daily (warfarin) Willis Pretty MD          Today, Patient Was Seen By: Raji Tom MD

## 2022-12-26 NOTE — ASSESSMENT & PLAN NOTE
Lab Results   Component Value Date    HGBA1C 9 8 (H) 10/25/2022     Recent Labs     12/25/22  2109 12/26/22  0839 12/26/22  1127 12/26/22  1621   POCGLU 138 101 84 154*     · Significantly reduced regimen from home due to poor oral intake  · Goal sugar < 180  · Continue Lantus to 10 units at night  · Continue sliding scale insulin

## 2022-12-26 NOTE — PROGRESS NOTES
Progress Note - Infectious Disease   Tatiana Alcantara 65 y o  female MRN: [id number]  Unit/Bed#: E4 -01 Encounter: [id number]      Impression/Plan:     1  Recent Fever: Resolved  - Patient developed new fever on , though appears to correlate post op from her right AKA wound VAC change and left AKA stump site I&D  Suspect reactive to surgery and possibly to the infection of left AKA site  Now afebrile and is without leukocytosis       - antibiotics as below  - trend fever curve  -Follow CBC     2  Left AKA Stump Site Abscess:  - S/p OR last on  with abscess cavity found after stable removal of left AKA stump site  Purulent drainage sent for culture, growing Enterobacter cloacae  Discussed with vascular team, no current concern for bone infection but rather soft tissue only  Seems to be tolerating doxycycline      - continue doxycycline  - anticipate 10 days total of antibiotic, through 2023   -Vascular surgery follow-up ongoing     3  Sever PAD c/b bilateral atheroembolic events and limb ischemia, s/p bilateral AKA:  - Had left AKA , right AKA , right AKA stump washout with VAC placement , and right AKA VAC change with left AKA stump washout on        - vascular surgery following     4  Right Hemispheric CVA:  - CT head this admission noted new acute stroke on        5  T2DM:  - management per priamry     Antibiotics:  Doxycycline 3         Subjective:  No reported acute events over the weekend  No fevers  Wound VAC was changed      Objective:  Vitals:  Temp:  [98 °F (36 7 °C)-98 6 °F (37 °C)] 98 °F (36 7 °C)  HR:  [80-84] 81  Resp:  [18-20] 18  BP: (108-181)/(74-79) 170/77  SpO2:  [94 %-96 %] 96 %  Temp (24hrs), Av 3 °F (36 8 °C), Min:98 °F (36 7 °C), Max:98 6 °F (37 °C)  Current: Temperature: 98 °F (36 7 °C)    Physical Exam:   General:  No acute distress, nontoxic  Psychiatric:  Awake and alert  Pulmonary:  Normal respiratory excursion without accessory muscle use  Abdomen:  Soft, nontender  Extremities:  No edema  Media image from today was personally reviewed  Skin:  No rashes    Lab Results:  I have personally reviewed pertinent labs  Results from last 7 days   Lab Units 12/24/22  0459 12/23/22  0656 12/22/22  0539 12/21/22  0636 12/20/22  0606   POTASSIUM mmol/L 3 6 3 4* 3 6 3 3* 3 6   CHLORIDE mmol/L 104 104 103 103 103   CO2 mmol/L 29 27 28 28 27   BUN mg/dL 8 8 10 10 11   CREATININE mg/dL 0 65 0 65 0 65 0 63 0 72   EGFR ml/min/1 73sq m 96 96 96 97 90   CALCIUM mg/dL 8 1* 7 9* 7 6* 8 0* 8 2*   AST U/L  --   --  27 23 22   ALT U/L  --   --  13 8* 10*   ALK PHOS U/L  --   --  167* 165* 170*     Results from last 7 days   Lab Units 12/24/22 0459 12/23/22  0656 12/22/22  0539   WBC Thousand/uL 4 97 4 57 3 85*   HEMOGLOBIN g/dL 7 9* 7 4* 7 3*   PLATELETS Thousands/uL 227 200 214           Imaging Studies:   I have personally reviewed pertinent imaging study reports and images in PACS  EKG, Pathology, and Other Studies:   I have personally reviewed pertinent reports

## 2022-12-26 NOTE — ASSESSMENT & PLAN NOTE
Continue amlodipine 10 mg daily, doxazosin 4 mg at bedtime, metoprolol 100 mg daily, lisinopril 40 mg daily with hold parameters

## 2022-12-26 NOTE — PROGRESS NOTES
[facility]  Progress Note - [facility] [address] 1960, 65 y o  female MRN: [id number]  Unit/Bed#: E4 -01 Encounter: [id number]  Primary Care Provider: ALEX Ross   Date and time admitted to hospital: 10/21/2022  7:58 PM    * PAD (peripheral artery disease) [facility])  Assessment & Plan  65year old female former smoker w/HTN, HLD, uncontrolled type II DM (A1c 9 8), hx CVA, presenting with nonhealing extensive L heel ulceration and R hallux and 5th digit ulceration  Vascular surgery consulted for input  S/p multiple b/l endovascular interventions  JUSTIN  R hemispheric CVA    S/p L AKA 11/23/22 [facility])  S/p R AKA, wound debridement 12/1/22 [facility])  S/p R AKA wound/stump washout and VAC placement 12/16/22 [facility])  S/p R AKA wound/stump VAC change, L AKA stump washout/packing 12/19/22 ([facility])      Wound Cx: enterobacter cloacae     Recommendations:  - Bilateral tissue loss in the setting of uncontrolled type II DM and NYDIA on admission, now s/p multiple angiograms w/intervention    - Endovascular interventions, c/b atheroembolic event to the RLE and JUSTIN with R hemispheric CVA  - Now s/p L AKA on 11/23/22 and R AKA 12/1/22 w/ proximal thigh wound debridements    - R AKA stump with incisional dehiscence and thigh wounds requiring debridement  Taken to the OR on 12/16 for washout and VAC placement with return to the OR 12/19 for VAC change  L AKA stump with some drainage, multiple staples removed from the incision and iodoform 1' packing placed in small opening  - Packing to be changed daily by nursing  - Appreciate ID for assistance with abx- L stump cultures growing out enterobacter cloacae  PO doxycycline 100 mg BID for 10 days total  course  - Right AKA wound VAC changed  Wound measurements 15 cm x 4 cm x 2 cm  Continue wound VAC therapy    Change MWF-next change on Wednesday  - Incentive spirometer   - Medical management with statin, Plavix  - Continue coumadin  - Medical management and glucose control per SLIM  - Continue oral pain regimen, adjusted to long acting which appears to be controlling patient's pain well   - Appreciate psych input and management - addition of wellbutrin and adjustment of zoloft   - Disposition planning for rehab-    Subjective: No acute events overnight  Vitals:  /77 (BP Location: Left arm)   Pulse 81   Temp 98 °F (36 7 °C) (Temporal)   Resp 18   Ht 5' 4" (1 626 m)   Wt 66 5 kg (146 lb 9 7 oz)   SpO2 96%   BMI 25 16 kg/m²     I/Os:  I/O last 3 completed shifts:  In: -   Out: 5714 [Urine:1300; Drains:75]  No intake/output data recorded  Lab Results and Cultures:   CBC with diff:   Lab Results   Component Value Date    WBC 4 97 12/24/2022    HGB 7 9 (L) 12/24/2022    HCT 26 1 (L) 12/24/2022    MCV 86 12/24/2022     12/24/2022    MCH 26 1 (L) 12/24/2022    MCHC 30 3 (L) 12/24/2022    RDW 16 4 (H) 12/24/2022    MPV 8 6 (L) 12/24/2022    NRBC 0 12/24/2022   ,   BMP/CMP:  Lab Results   Component Value Date    SODIUM 141 12/24/2022    K 3 6 12/24/2022     12/24/2022    CO2 29 12/24/2022    BUN 8 12/24/2022    CREATININE 0 65 12/24/2022    CALCIUM 8 1 (L) 12/24/2022    AST 27 12/22/2022    ALT 13 12/22/2022    ALKPHOS 167 (H) 12/22/2022    EGFR 96 12/24/2022   ,   Lipid Panel: No results found for: CHOL,   Coags:   Lab Results   Component Value Date    PTT 95 (H) 12/12/2022    INR 2 25 (H) 12/25/2022   ,     Blood Culture:   Lab Results   Component Value Date    BLOODCX No Growth After 5 Days   11/13/2022    BLOODCX Staphylococcus coagulase negative (A) 11/13/2022   ,   Urinalysis:   Lab Results   Component Value Date    COLORU Yellow 11/12/2022    CLARITYU Slightly Cloudy 11/12/2022    SPECGRAV 1 025 11/12/2022    PHUR 5 5 11/12/2022    PHUR 6 5 02/23/2018    LEUKOCYTESUR Trace (A) 11/12/2022    NITRITE Positive (A) 11/12/2022    GLUCOSEU Negative 11/12/2022    KETONESU Negative 11/12/2022    BILIRUBINUR Negative 11/12/2022    BLOODU Large (A) 11/12/2022   ,   Urine Culture:   Lab Results   Component Value Date    URINECX >100,000 cfu/ml Enterobacter cloacae complex (A) 11/12/2022    URINECX 70,000-79,000 cfu/ml Kluyveromyces marxianus (A) 11/12/2022   ,   Wound Culure: No results found for: WOUNDCULT    Medications:  Current Facility-Administered Medications   Medication Dose Route Frequency   • acetaminophen (TYLENOL) tablet 650 mg  650 mg Oral Q6H PRN   • ALPRAZolam (XANAX) tablet 0 25 mg  0 25 mg Oral Daily PRN   • aluminum-magnesium hydroxide-simethicone (MYLANTA) oral suspension 30 mL  30 mL Oral Q4H PRN   • amLODIPine (NORVASC) tablet 10 mg  10 mg Oral Daily   • atorvastatin (LIPITOR) tablet 40 mg  40 mg Oral Daily With Dinner   • buPROPion (WELLBUTRIN XL) 24 hr tablet 150 mg  150 mg Oral Daily   • clopidogrel (PLAVIX) tablet 75 mg  75 mg Oral Daily   • collagenase (SANTYL) ointment   Topical Daily   • doxazosin (CARDURA) tablet 4 mg  4 mg Oral HS   • doxycycline hyclate (VIBRAMYCIN) capsule 100 mg  100 mg Oral Q12H [address]   • ferrous sulfate tablet 325 mg  325 mg Oral Daily With Lunch   • gabapentin (NEURONTIN) capsule 400 mg  400 mg Oral TID   • insulin glargine (LANTUS) subcutaneous injection 10 Units 0 1 mL  10 Units Subcutaneous HS   • insulin lispro (HumaLOG) 100 units/mL subcutaneous injection 1-6 Units  1-6 Units Subcutaneous TID AC   • labetalol (NORMODYNE) injection 10 mg  10 mg Intravenous Q6H PRN   • lisinopril (ZESTRIL) tablet 40 mg  40 mg Oral Daily   • methocarbamol (ROBAXIN) tablet 1,000 mg  1,000 mg Oral Q8H [address]   • metoclopramide (REGLAN) injection 10 mg  10 mg Intravenous Q6H PRN   • metoprolol succinate (TOPROL-XL) 24 hr tablet 100 mg  100 mg Oral Daily   • naloxone (NARCAN) 0 04 mg/mL syringe 0 04 mg  0 04 mg Intravenous Q1MIN PRN   • nystatin (MYCOSTATIN) powder   Topical BID   • ondansetron (ZOFRAN) injection 4 mg  4 mg Intravenous Q6H PRN   • oxyCODONE (OxyCONTIN) 12 hr tablet 10 mg  10 mg Oral Q12H [address]   • oxyCODONE (ROXICODONE) IR tablet 2 5 mg  2 5 mg Oral Q4H PRN    Or   • oxyCODONE (ROXICODONE) IR tablet 5 mg  5 mg Oral Q4H PRN    Or   • oxyCODONE (ROXICODONE) IR tablet 7 5 mg  7 5 mg Oral Q4H PRN   • pantoprazole (PROTONIX) EC tablet 40 mg  40 mg Oral Early Morning   • polyethylene glycol (MIRALAX) packet 17 g  17 g Oral BID   • potassium chloride (K-DUR,KLOR-CON) CR tablet 20 mEq  20 mEq Oral Daily   • QUEtiapine (SEROquel) tablet 25 mg  25 mg Oral HS   • senna (SENOKOT) tablet 8 6 mg  1 tablet Oral BID   • sertraline (ZOLOFT) tablet 175 mg  175 mg Oral Daily   • warfarin (COUMADIN) tablet 4 mg  4 mg Oral Daily (warfarin)       Imaging:  Reviewed  Physical Exam  Constitutional:       General: She is not in acute distress  Appearance: She is not ill-appearing  Pulmonary:      Effort: Pulmonary effort is normal    Musculoskeletal:      Comments: Wound vac on RLE changed at bedside      Right Lower Extremity: Right leg is amputated above knee  Left Lower Extremity: Left leg is amputated above knee  Neurological:      Mental Status: She is alert         Location of wound: RLE stump    Sponges removed:  2 Black Sponges  0 White Sponges    Dimensions of wound: 11 cm x 4 cm x   0 5 cm    Description of wound:       Applied santyl to slough    Sponges placed:  4 Black Sponges  0 White Sponges    VAC settings:  125 mmHg  Continuous    Pt tolerated procedure   VAC sticker placed to wound dressing, per protocol      Robby Pastor MD  12/26/2022        Robby Pastor MD  12/26/2022

## 2022-12-26 NOTE — PLAN OF CARE
Problem: Potential for Falls  Goal: Patient will remain free of falls  Description: INTERVENTIONS:  - Educate patient/family on patient safety including physical limitations  - Instruct patient to call for assistance with activity   - Consult OT/PT to assist with strengthening/mobility   - Keep Call bell within reach  - Keep bed low and locked with side rails adjusted as appropriate  - Keep care items and personal belongings within reach  - Initiate and maintain comfort rounds  - Make Fall Risk Sign visible to staff  - Offer Toileting every 2 Hours, in advance of need  - Initiate/Maintain bed  chair alarm  - Obtain necessary fall risk management equipment: bed chair alarm, call bell, gripper sock, walker, bedside commode  - Apply yellow socks and bracelet for high fall risk patients  - Consider moving patient to room near nurses station  Outcome: Progressing     Problem: MOBILITY - ADULT  Goal: Maintain or return to baseline ADL function  Description: INTERVENTIONS:  -  Assess patient's ability to carry out ADLs; assess patient's baseline for ADL function and identify physical deficits which impact ability to perform ADLs (bathing, care of mouth/teeth, toileting, grooming, dressing, etc )  - Assess/evaluate cause of self-care deficits   - Assess range of motion  - Assess patient's mobility; develop plan if impaired  - Assess patient's need for assistive devices and provide as appropriate  - Encourage maximum independence but intervene and supervise when necessary  - Involve family in performance of ADLs  - Assess for home care needs following discharge   - Consider OT consult to assist with ADL evaluation and planning for discharge  - Provide patient education as appropriate  Outcome: Progressing  Goal: Maintains/Returns to pre admission functional level  Description: INTERVENTIONS:  - Perform BMAT or MOVE assessment daily    - Set and communicate daily mobility goal to care team and patient/family/caregiver     - Collaborate with rehabilitation services on mobility goals if consulted  - Assist patient in repositioning every 2 hours    - Dangle patient 3 times a day  - Stand patient 3 times a day  - Out of bed to chair as tolerated  - Out of bed for meals  - Out of bed for toileting  - Record patient progress and toleration of activity level   Outcome: Progressing     Problem: PAIN - ADULT  Goal: Verbalizes/displays adequate comfort level or baseline comfort level  Description: Interventions:  - Encourage patient to monitor pain and request assistance  - Assess pain using appropriate pain scale  - Administer analgesics based on type and severity of pain and evaluate response  - Implement non-pharmacological measures as appropriate and evaluate response  - Consider cultural and social influences on pain and pain management  - Notify physician/advanced practitioner if interventions unsuccessful or patient reports new pain  Outcome: Progressing     Problem: INFECTION - ADULT  Goal: Absence or prevention of progression during hospitalization  Description: INTERVENTIONS:  - Assess and monitor for signs and symptoms of infection  - Monitor lab/diagnostic results  - Monitor all insertion sites, i e  indwelling lines, tubes, and drains  - Administer medications as ordered  - Instruct and encourage patient and family to use good hand hygiene technique  Outcome: Progressing     Problem: SAFETY ADULT  Goal: Patient will remain free of falls  Description: INTERVENTIONS:  - Educate patient/family on patient safety including physical limitations  - Instruct patient to call for assistance with activity   - Consult OT/PT to assist with strengthening/mobility   - Keep Call bell within reach  - Keep bed low and locked with side rails adjusted as appropriate  - Keep care items and personal belongings within reach  - Initiate and maintain comfort rounds  - Make Fall Risk Sign visible to staff  - Offer Toileting every 2 Hours, in advance of need  - Initiate/Maintain bed  chair alarm  - Obtain necessary fall risk management equipment: bed chair alarm, call bell, gripper sock, walker, bedside commode  - Apply yellow socks and bracelet for high fall risk patients  - Consider moving patient to room near nurses station  Outcome: Progressing  Goal: Maintain or return to baseline ADL function  Description: INTERVENTIONS:  -  Assess patient's ability to carry out ADLs; assess patient's baseline for ADL function and identify physical deficits which impact ability to perform ADLs (bathing, care of mouth/teeth, toileting, grooming, dressing, etc )  - Assess/evaluate cause of self-care deficits   - Assess range of motion  - Assess patient's mobility; develop plan if impaired  - Assess patient's need for assistive devices and provide as appropriate  - Encourage maximum independence but intervene and supervise when necessary  - Involve family in performance of ADLs  - Assess for home care needs following discharge   - Consider OT consult to assist with ADL evaluation and planning for discharge  - Provide patient education as appropriate  Outcome: Progressing  Goal: Maintains/Returns to pre admission functional level  Description: INTERVENTIONS:  - Perform BMAT or MOVE assessment daily    - Set and communicate daily mobility goal to care team and patient/family/caregiver  - Collaborate with rehabilitation services on mobility goals if consulted  - Assist patient in repositioning every 2 hours    - Dangle patient 3 times a day  - Stand patient 3 times a day  - Out of bed to chair as tolerated  - Out of bed for meals  - Out of bed for toileting  - Record patient progress and toleration of activity level   Outcome: Progressing     Problem: DISCHARGE PLANNING  Goal: Discharge to home or other facility with appropriate resources  Description: INTERVENTIONS:  - Identify barriers to discharge w/patient   - Arrange for needed discharge resources and transportation as appropriate  - Identify discharge learning needs  - Refer to Case Management Department for coordinating discharge planning if the patient needs post-hospital services based on physician/advanced practitioner order   Outcome: Progressing     Problem: Knowledge Deficit  Goal: Patient/family/caregiver demonstrates understanding of disease process, treatment plan, medications, and discharge instructions  Description: Complete learning assessment and assess knowledge base    Interventions:  - Provide teaching at level of understanding  - Provide teaching via preferred learning methods  Outcome: Progressing     Problem: METABOLIC, FLUID AND ELECTROLYTES - ADULT  Goal: Electrolytes maintained within normal limits  Description: INTERVENTIONS:  - Monitor labs and assess patient for signs and symptoms of electrolyte imbalances  - Administer electrolyte replacement as ordered  - Monitor response to electrolyte replacements, including repeat lab results as appropriate  - Instruct patient on fluid and nutrition as appropriate  Outcome: Progressing  Goal: Fluid balance maintained  Description: INTERVENTIONS:  - Monitor labs   - Monitor I/O and WT  - Instruct patient on fluid and nutrition as appropriate  - Assess for signs & symptoms of volume excess or deficit  Outcome: Progressing  Goal: Glucose maintained within target range  Description: INTERVENTIONS:  - Monitor Blood Glucose as ordered  - Assess for signs and symptoms of hyperglycemia and hypoglycemia  - Administer ordered medications to maintain glucose within target range  - Assess nutritional intake and initiate nutrition service referral as needed  Outcome: Progressing     Problem: SKIN/TISSUE INTEGRITY - ADULT  Goal: Skin Integrity remains intact(Skin Breakdown Prevention)  Description: Assess:  -Perform Nicanor assessment every shift  -Clean and moisturize skin every shift  -Inspect skin when repositioning, toileting, and assisting with ADLS  -Assess under medical devices   -Assess extremities for adequate circulation and sensation     Bed Management:  -Have minimal linens on bed & keep smooth, unwrinkled  -Change linens as needed when moist or perspiring  -Avoid sitting or lying in one position for more than 2 hours while in bed    Toileting:  -Offer bedside commode  -Assess for incontinence every 2 hours  -Use incontinent care products after each incontinent episode     Activity:  -Mobilize patient 3 times a day  -Encourage or provide ROM exercises   -Turn and reposition patient every 2 Hours  -Use appropriate equipment to lift or move patient in bed  -Instruct/ Assist with weight shifting every 15 minutes when out of bed in chair  -Consider limitation of chair time 1 hour intervals    Skin Care:  -Avoid use of baby powder, tape, friction and shearing, hot water or constrictive clothing  -Relieve pressure over bony prominences using waffle cushion when in chair  -Do not massage red bony areas    Outcome: Progressing  Goal: Incision(s), wounds(s) or drain site(s) healing without S/S of infection  Description: INTERVENTIONS  - Assess and document dressing, incision, wound bed, drain sites and surrounding tissue  - Provide patient and family education  - Perform skin care/dressing changes everyday as ordered  Outcome: Progressing  Goal: Pressure injury heals and does not worsen  Description: Interventions:  - Implement low air loss mattress or specialty surface (Criteria met)  - Apply silicone foam dressing  - Instruct/assist with weight shifting every 15 minutes when in chair   - Limit chair time to 1 hour intervals  - Use special pressure reducing interventions such as waffle cushion when in chair   - Perform passive or active ROM   - Turn and reposition patient & offload bony prominences every 2 hours   - Utilize friction reducing device or surface for transfers   - Consider consults to  interdisciplinary teams such as wound care, PT/OT  - Use incontinent care products after each incontinent episode   - Consider nutrition services referral as needed  Outcome: Progressing     Problem: MUSCULOSKELETAL - ADULT  Goal: Maintain or return mobility to safest level of function  Description: INTERVENTIONS:  - Assess patient's ability to carry out ADLs; assess patient's baseline for ADL function and identify physical deficits which impact ability to perform ADLs (bathing, care of mouth/teeth, toileting, grooming, dressing, etc )  - Assess/evaluate cause of self-care deficits   - Assess range of motion  - Assess patient's mobility  - Assess patient's need for assistive devices and provide as appropriate  - Encourage maximum independence but intervene and supervise when necessary  - Involve family in performance of ADLs  - Assess for home care needs following discharge   - Consider OT consult to assist with ADL evaluation and planning for discharge  - Provide patient education as appropriate  Outcome: Progressing  Goal: Maintain proper alignment of affected body part  Description: INTERVENTIONS:  - Support, maintain and protect limb and body alignment  - Provide patient/ family with appropriate education  Outcome: Progressing     Problem: CARDIOVASCULAR - ADULT  Goal: Maintains optimal cardiac output and hemodynamic stability  Description: INTERVENTIONS:  - Monitor I/O, vital signs and rhythm  - Monitor for S/S and trends of decreased cardiac output  - Administer and titrate ordered vasoactive medications to optimize hemodynamic stability  - Assess quality of pulses, skin color and temperature  - Assess for signs of decreased coronary artery perfusion  - Instruct patient to report change in severity of symptoms  Outcome: Progressing     Problem: RESPIRATORY - ADULT  Goal: Achieves optimal ventilation and oxygenation  Description: INTERVENTIONS:  - Assess for changes in respiratory status  - Assess for changes in mentation and behavior  - Position to facilitate oxygenation and minimize respiratory effort  - Oxygen administered by appropriate delivery if ordered  - Initiate smoking cessation education as indicated  - Encourage broncho-pulmonary hygiene including cough, deep breathe, Incentive Spirometry  - Assess the need for suctioning and aspirate as needed  - Assess and instruct to report SOB or any respiratory difficulty  - Respiratory Therapy support as indicated  Outcome: Progressing     Problem: GENITOURINARY - ADULT  Goal: Maintains or returns to baseline urinary function  Description: INTERVENTIONS:  - Assess urinary function  - Encourage oral fluids to ensure adequate hydration if ordered  - Administer IV fluids as ordered to ensure adequate hydration  - Administer ordered medications as needed  - Offer frequent toileting  - Follow urinary retention protocol if ordered  Outcome: Progressing  Goal: Absence of urinary retention  Description: INTERVENTIONS:  - Assess patient’s ability to void and empty bladder  - Monitor I/O  - Bladder scan as needed  - Discuss with physician/AP medications to alleviate retention as needed  - Discuss catheterization for long term situations as appropriate  Outcome: Progressing     Problem: HEMATOLOGIC - ADULT  Goal: Maintains hematologic stability  Description: INTERVENTIONS  - Assess for signs and symptoms of bleeding or hemorrhage  - Monitor labs  - Administer supportive blood products/factors as ordered and appropriate  Outcome: Progressing     Problem: Prexisting or High Potential for Compromised Skin Integrity  Goal: Skin integrity is maintained or improved  Description: INTERVENTIONS:  - Identify patients at risk for skin breakdown  - Assess and monitor skin integrity  - Assess and monitor nutrition and hydration status  - Monitor labs   - Assess for incontinence   - Turn and reposition patient  - Assist with mobility/ambulation  - Relieve pressure over bony prominences  - Avoid friction and shearing  - Provide appropriate hygiene as needed including keeping skin clean and dry  - Evaluate need for skin moisturizer/barrier cream  - Collaborate with interdisciplinary team   - Patient/family teaching  - Consider wound care consult   Outcome: Progressing     Problem: Nutrition/Hydration-ADULT  Goal: Nutrient/Hydration intake appropriate for improving, restoring or maintaining nutritional needs  Description: Monitor and assess patient's nutrition/hydration status for malnutrition  Collaborate with interdisciplinary team and initiate plan and interventions as ordered  Monitor patient's weight and dietary intake as ordered or per policy  Utilize nutrition screening tool and intervene as necessary  Determine patient's food preferences and provide high-protein, high-caloric foods as appropriate       INTERVENTIONS:  - Monitor oral intake, urinary output, labs, and treatment plans  - Assess nutrition and hydration status and recommend course of action  - Evaluate amount of meals eaten  - Assist patient with eating if necessary   - Allow adequate time for meals  - Recommend/ encourage appropriate diets, oral nutritional supplements, and vitamin/mineral supplements  - Order, calculate, and assess calorie counts as needed  - Recommend, monitor, and adjust tube feedings and TPN/PPN based on assessed needs  - Assess need for intravenous fluids  - Provide specific nutrition/hydration education as appropriate  - Include patient/family/caregiver in decisions related to nutrition  Outcome: Not Progressing

## 2022-12-27 LAB
BASOPHILS # BLD AUTO: 0.04 THOUSANDS/ÂΜL (ref 0–0.1)
BASOPHILS NFR BLD AUTO: 1 % (ref 0–1)
EOSINOPHIL # BLD AUTO: 0.48 THOUSAND/ÂΜL (ref 0–0.61)
EOSINOPHIL NFR BLD AUTO: 6 % (ref 0–6)
ERYTHROCYTE [DISTWIDTH] IN BLOOD BY AUTOMATED COUNT: 16.4 % (ref 11.6–15.1)
GLUCOSE SERPL-MCNC: 107 MG/DL (ref 65–140)
GLUCOSE SERPL-MCNC: 112 MG/DL (ref 65–140)
GLUCOSE SERPL-MCNC: 127 MG/DL (ref 65–140)
GLUCOSE SERPL-MCNC: 175 MG/DL (ref 65–140)
HCT VFR BLD AUTO: 27 % (ref 34.8–46.1)
HGB BLD-MCNC: 7.9 G/DL (ref 11.5–15.4)
IMM GRANULOCYTES # BLD AUTO: 0.2 THOUSAND/UL (ref 0–0.2)
IMM GRANULOCYTES NFR BLD AUTO: 2 % (ref 0–2)
INR PPP: 3.3 (ref 0.84–1.19)
LYMPHOCYTES # BLD AUTO: 1.9 THOUSANDS/ÂΜL (ref 0.6–4.47)
LYMPHOCYTES NFR BLD AUTO: 23 % (ref 14–44)
MCH RBC QN AUTO: 25.3 PG (ref 26.8–34.3)
MCHC RBC AUTO-ENTMCNC: 29.3 G/DL (ref 31.4–37.4)
MCV RBC AUTO: 87 FL (ref 82–98)
MONOCYTES # BLD AUTO: 0.72 THOUSAND/ÂΜL (ref 0.17–1.22)
MONOCYTES NFR BLD AUTO: 9 % (ref 4–12)
NEUTROPHILS # BLD AUTO: 4.95 THOUSANDS/ÂΜL (ref 1.85–7.62)
NEUTS SEG NFR BLD AUTO: 59 % (ref 43–75)
NRBC BLD AUTO-RTO: 0 /100 WBCS
PLATELET # BLD AUTO: 370 THOUSANDS/UL (ref 149–390)
PMV BLD AUTO: 8.4 FL (ref 8.9–12.7)
PROTHROMBIN TIME: 33.3 SECONDS (ref 11.6–14.5)
RBC # BLD AUTO: 3.12 MILLION/UL (ref 3.81–5.12)
WBC # BLD AUTO: 8.29 THOUSAND/UL (ref 4.31–10.16)

## 2022-12-27 PROCEDURE — 97535 SELF CARE MNGMENT TRAINING: CPT

## 2022-12-27 PROCEDURE — 82948 REAGENT STRIP/BLOOD GLUCOSE: CPT

## 2022-12-27 PROCEDURE — 99232 SBSQ HOSP IP/OBS MODERATE 35: CPT | Performed by: INTERNAL MEDICINE

## 2022-12-27 PROCEDURE — 85610 PROTHROMBIN TIME: CPT | Performed by: INTERNAL MEDICINE

## 2022-12-27 PROCEDURE — 97110 THERAPEUTIC EXERCISES: CPT

## 2022-12-27 PROCEDURE — 85025 COMPLETE CBC W/AUTO DIFF WBC: CPT | Performed by: INTERNAL MEDICINE

## 2022-12-27 PROCEDURE — 97530 THERAPEUTIC ACTIVITIES: CPT

## 2022-12-27 PROCEDURE — 99024 POSTOP FOLLOW-UP VISIT: CPT | Performed by: SURGERY

## 2022-12-27 RX ORDER — WARFARIN SODIUM 3 MG/1
3 TABLET ORAL
Status: DISCONTINUED | OUTPATIENT
Start: 2022-12-27 | End: 2023-01-02

## 2022-12-27 RX ADMIN — NYSTATIN: 100000 POWDER TOPICAL at 09:08

## 2022-12-27 RX ADMIN — ATORVASTATIN CALCIUM 40 MG: 40 TABLET, FILM COATED ORAL at 16:46

## 2022-12-27 RX ADMIN — SERTRALINE HYDROCHLORIDE 175 MG: 100 TABLET ORAL at 09:07

## 2022-12-27 RX ADMIN — DOXAZOSIN 4 MG: 4 TABLET ORAL at 21:11

## 2022-12-27 RX ADMIN — COLLAGENASE SANTYL: 250 OINTMENT TOPICAL at 09:15

## 2022-12-27 RX ADMIN — LISINOPRIL 40 MG: 20 TABLET ORAL at 09:07

## 2022-12-27 RX ADMIN — OXYCODONE HYDROCHLORIDE 10 MG: 10 TABLET, FILM COATED, EXTENDED RELEASE ORAL at 21:11

## 2022-12-27 RX ADMIN — GABAPENTIN 400 MG: 400 CAPSULE ORAL at 21:11

## 2022-12-27 RX ADMIN — DOXYCYCLINE 100 MG: 100 CAPSULE ORAL at 09:07

## 2022-12-27 RX ADMIN — AMLODIPINE BESYLATE 10 MG: 10 TABLET ORAL at 09:06

## 2022-12-27 RX ADMIN — CLOPIDOGREL BISULFATE 75 MG: 75 TABLET ORAL at 09:06

## 2022-12-27 RX ADMIN — FERROUS SULFATE TAB 325 MG (65 MG ELEMENTAL FE) 325 MG: 325 (65 FE) TAB at 13:00

## 2022-12-27 RX ADMIN — PANTOPRAZOLE SODIUM 40 MG: 40 TABLET, DELAYED RELEASE ORAL at 05:14

## 2022-12-27 RX ADMIN — OXYCODONE HYDROCHLORIDE 10 MG: 10 TABLET, FILM COATED, EXTENDED RELEASE ORAL at 09:08

## 2022-12-27 RX ADMIN — METHOCARBAMOL 1000 MG: 500 TABLET ORAL at 05:14

## 2022-12-27 RX ADMIN — BUPROPION HYDROCHLORIDE 150 MG: 150 TABLET, FILM COATED, EXTENDED RELEASE ORAL at 09:07

## 2022-12-27 RX ADMIN — POTASSIUM CHLORIDE 20 MEQ: 1500 TABLET, EXTENDED RELEASE ORAL at 09:07

## 2022-12-27 RX ADMIN — METHOCARBAMOL 1000 MG: 500 TABLET ORAL at 21:11

## 2022-12-27 RX ADMIN — QUETIAPINE FUMARATE 25 MG: 25 TABLET ORAL at 21:11

## 2022-12-27 RX ADMIN — GABAPENTIN 400 MG: 400 CAPSULE ORAL at 16:46

## 2022-12-27 RX ADMIN — METHOCARBAMOL 1000 MG: 500 TABLET ORAL at 13:21

## 2022-12-27 RX ADMIN — WARFARIN SODIUM 3 MG: 3 TABLET ORAL at 17:10

## 2022-12-27 RX ADMIN — DOXYCYCLINE 100 MG: 100 CAPSULE ORAL at 21:11

## 2022-12-27 RX ADMIN — INSULIN LISPRO 1 UNITS: 100 INJECTION, SOLUTION INTRAVENOUS; SUBCUTANEOUS at 16:46

## 2022-12-27 RX ADMIN — POLYETHYLENE GLYCOL 3350 17 G: 17 POWDER, FOR SOLUTION ORAL at 21:11

## 2022-12-27 RX ADMIN — INSULIN GLARGINE 10 UNITS: 100 INJECTION, SOLUTION SUBCUTANEOUS at 21:11

## 2022-12-27 RX ADMIN — METOPROLOL SUCCINATE 100 MG: 100 TABLET, FILM COATED, EXTENDED RELEASE ORAL at 09:07

## 2022-12-27 RX ADMIN — NYSTATIN: 100000 POWDER TOPICAL at 17:09

## 2022-12-27 RX ADMIN — GABAPENTIN 400 MG: 400 CAPSULE ORAL at 09:07

## 2022-12-27 NOTE — ASSESSMENT & PLAN NOTE
· She has had multiple  blood transfusions during this admission  · Last blood transfusion was on  12/15/2022 prior to her surgery    · Monitor  · Hemoglobin continues to decline, FOBT negative  · Review of recent iron panel appears to show picture of anemia of chronic disease  · Additionally, iron noted to be low, will initiate iron supplementation  · appears to be stable at 7 9, monitor

## 2022-12-27 NOTE — PROGRESS NOTES
[facility]  [facility] 1960, 65 y o  female MRN: [id number]  Unit/Bed#: E4 -01 Encounter: [id number]  Primary Care Provider: ALEX Ramey   Date and time admitted to hospital: 10/21/2022  7:58 PM    * PAD (peripheral artery disease) ([facility] [address] )  Assessment & Plan  · She was initially admitted for nonhealing wound of the left lower extremity, requiring multiple endovascular interventions and ultimately underwent left AKA on 11/23/2022 and right AKA on 12/1/22  · S/p right stump debridement and vac placement 12/16/22  · For additional right stump washout and VAC exchange- 12/19/2022  per surgery  · Continue pain control  · Eventual short-term rehab placement when medically stable  · Management per primary service    Febrile  Assessment & Plan  Intermittent fever; but currently afebrile x > 48 hours  UA positive, but likely contaminant rather than an actual urinary tract infection  Possibly post op fever  · Continue to have fever; wound cultures from recent wound washout with enterobacteria  · ID recommending 10 days of doxycycline twice daily; abx through 1/1/23    HIT (heparin-induced thrombocytopenia)  Assessment & Plan  · Positive heparin antibody on 11/11/2022  · Completed 5 days of overlap with argatroban and Coumadin   · Continue coumadin for minimum of 4 weeks  · INR therapeutic; however appears to decline on 3 mg daily; have increased to 4 mg daily  · Goal INR 2-3    Diabetic ulcer of heel associated with diabetes mellitus due to underlying condition [facility])  Assessment & Plan  S/p left aka 11/23/2022  · Post-op wound management per vascular surgery  · Await next step in management from vascular surgery, underwent surgical washout of right stump debridement of left stump on 12/19  · Pain appears well controlled on OxyContin 10 twice daily and gabapentin 400mg tid    · As needed pain medications taken sparingly    Acute on chronic anemia  Assessment & Plan  · She has had multiple  blood transfusions during this admission  · Last blood transfusion was on  12/15/2022 prior to her surgery  · Monitor  · Hemoglobin continues to decline, FOBT negative  · Review of recent iron panel appears to show picture of anemia of chronic disease  · Additionally, iron noted to be low, will initiate iron supplementation  · appears to be stable at 7 9, monitor    Type 2 diabetes mellitus with hyperglycemia, with long-term current use of insulin [facility])  Assessment & Plan  Lab Results   Component Value Date    HGBA1C 9 8 (H) 10/25/2022     Recent Labs     12/26/22  1127 12/26/22  1621 12/26/22  2058 12/27/22  0740   POCGLU 84 154* 132 107     · Significantly reduced regimen from home due to poor oral intake  · Goal sugar < 180  · Continue Lantus to 10 units at night  · Continue sliding scale insulin    Stroke [facility])  Assessment & Plan  · Noted to have change in mental status on 11/13/2022  · CT confirmed moderate right parietal lobe stroke, likely embolic in the setting of HIT with thrombosis  · She was treated with argatroban/coumadin bridge  · INR 3 3 will give warfarin 3 mg today, monitor INR closely  · Goal INR 2-3    Benign essential hypertension  Assessment & Plan  Continue amlodipine 10 mg daily, doxazosin 4 mg at bedtime, metoprolol 100 mg daily, lisinopril 40 mg daily with hold parameters    Depression, recurrent ([facility] [address] )  Assessment & Plan  Evaluated by Psychiatry x 2 on this admission  ·  continue Zoloft 175 mg daily, Wellbutrin 150 mg daily, Seroquel 25 mg at bedtime  VTE Pharmacologic Prophylaxis:   Pharmacologic: Warfarin    Patient Centered Rounds: I have performed bedside rounds with nursing staff today  Education and Discussions with Family / Patient: Patient    Time Spent for Care: 20 minutes  More than 50% of total time spent on counseling and coordination of care as described above      Current Length of Stay: 66 day(s)    Current Patient Status: Inpatient     Code Status: Level 1 - Full Code      Subjective:   Patient seen and examined at bedside, denies any complaints    Objective:     Vitals:   Temp (24hrs), Av 8 °F (36 6 °C), Min:97 7 °F (36 5 °C), Max:97 9 °F (36 6 °C)    Temp:  [97 7 °F (36 5 °C)-97 9 °F (36 6 °C)] 97 7 °F (36 5 °C)  HR:  [81-85] 81  Resp:  [18] 18  BP: (162-181)/(67-76) 167/74  SpO2:  [93 %-95 %] 94 %  Body mass index is 26 34 kg/m²  Input and Output Summary (last 24 hours): Intake/Output Summary (Last 24 hours) at 2022 1425  Last data filed at 2022 1353  Gross per 24 hour   Intake --   Output 900 ml   Net -900 ml       Physical Exam:    Constitutional: Patient is oriented to person, place and time, no acute distress  HEENT:  Normocephalic, atraumatic  Cardiovascular: Normal S1S2, RRR, No murmurs/rubs/gallops appreciated  Pulmonary:  Bilateral air entry, No rhonchi/rales/wheezing appreciated  Abdominal: Soft, Bowel sounds present, Non-tender, Non-distended  Extremities: Bilateral AKA, right AKA with wound VAC in place  Neurological: Cranial nerves II-XII grossly intact, sensation intact, otherwise no focal neurological symptoms  Skin:  Warm, dry    Additional Data:     Labs:    Results from last 7 days   Lab Units 22  0455   WBC Thousand/uL 8 29   HEMOGLOBIN g/dL 7 9*   HEMATOCRIT % 27 0*   PLATELETS Thousands/uL 370   NEUTROS PCT % 59   LYMPHS PCT % 23   MONOS PCT % 9   EOS PCT % 6     Results from last 7 days   Lab Units 22  0459 22  0656 22  0539   POTASSIUM mmol/L 3 6   < > 3 6   CHLORIDE mmol/L 104   < > 103   CO2 mmol/L 29   < > 28   BUN mg/dL 8   < > 10   CREATININE mg/dL 0 65   < > 0 65   CALCIUM mg/dL 8 1*   < > 7 6*   ALK PHOS U/L  --   --  167*   ALT U/L  --   --  13   AST U/L  --   --  27    < > = values in this interval not displayed       Results from last 7 days   Lab Units 22  0455   INR  3 30*        I Have Reviewed All Lab Data Listed Above     Invasive Devices     Peripherally Inserted Central Catheter Line  Duration           PICC Line 77/42/45 Right Basilic 39 days          Drain  Duration           External Urinary Catheter 4 days                     Recent Cultures (last 7 days):           Last 24 Hours Medication List:   Current Facility-Administered Medications   Medication Dose Route Frequency Provider Last Rate   • acetaminophen  650 mg Oral Q6H PRN Lajoyce Mynor, DO     • ALPRAZolam  0 25 mg Oral Daily PRN Lajoyce Mynor, DO     • aluminum-magnesium hydroxide-simethicone  30 mL Oral Q4H PRN Lajoyce Mynor, DO     • amLODIPine  10 mg Oral Daily Lajoyce Mynor, DO     • atorvastatin  40 mg Oral Daily With [address], DO     • buPROPion  150 mg Oral Daily Lajoyce Mynor, DO     • clopidogrel  75 mg Oral Daily Lajoyce Mynor, DO     • collagenase   Topical Daily Lajoyce Mynor, DO     • doxazosin  4 mg Oral HS Lajoyce Mynor, DO     • doxycycline hyclate  100 mg Oral Q12H Sagrario Paul MD     • ferrous sulfate  325 mg Oral Daily With Lunch [facility] Reba, DO     • gabapentin  400 mg Oral TID Lajoyce Mynor, DO     • insulin glargine  10 Units Subcutaneous HS Lajoyce Mynor, DO     • insulin lispro  1-6 Units Subcutaneous TID AC Lajoyce Mynor, DO     • labetalol  10 mg Intravenous Q6H PRN Lajoyce Mynor, DO     • lisinopril  40 mg Oral Daily Lajoyce Mynor, DO     • methocarbamol  1,000 mg Oral [facility] Lajoyce Mynor, DO     • metoclopramide  10 mg Intravenous Q6H PRN Lajoyce Mynor, DO     • metoprolol succinate  100 mg Oral Daily Hans Rosales MD     • naloxone  0 04 mg Intravenous Q1MIN PRN Lajoyce Mynor, DO     • nystatin   Topical BID Lajoyce Mynor, DO     • ondansetron  4 mg Intravenous Q6H PRN Lajoyce Mynor, DO     • oxyCODONE  10 mg Oral Q12H [facility] Lajoyce Mynor, DO     • oxyCODONE  2 5 mg Oral Q4H PRN Chi Nguyen Cristian Barnes, DO      Or   • oxyCODONE  5 mg Oral Q4H PRN Don Notice, DO      Or   • oxyCODONE  7 5 mg Oral Q4H PRN [facility] Notice, DO     • pantoprazole  40 mg Oral Early Morning Don Notice, DO     • polyethylene glycol  17 g Oral BID Don Notice, DO     • potassium chloride  20 mEq Oral Daily Jean Claude Hyde MD     • QUEtiapine  25 mg Oral HS Don Notice, DO     • senna  1 tablet Oral BID Don Notice, DO     • sertraline  175 mg Oral Daily Don Notice, DO     • warfarin  3 mg Oral Daily (warfarin) Noah Wu MD          Today, Patient Was Seen By: Noah Wu MD

## 2022-12-27 NOTE — UTILIZATION REVIEW
Continued Stay Review    Date: 12/22 (data included with 12/23 ) and  12/26 and 12/27                   Current Patient Class: IP Current Level of Care: MS    HPI:62 y o  female initially admitted on 10/22    Assessment/Plan:   12/22 - last op intervention 12/19  Pt is tolerating bedside dressing changes and wound vac changes  Continues on oral analgesia  Was seen by psych and put on Wellbutrin and adjustment made to Zoloft  Some stump pain on exam   Hemodynamically stable  Antibiotics to cover Enterobacter through 12/31  Started on iron for hgb  FOB pending  H/H stable in 7s       12/26 - continues with wound vac therapy  No acute events  Doxycycline through 1/2/23  On exam stable with good analgesia  12/27 - VAC changed today, next VAC change on 12/28  Good pain control on scheduled and PRN oral analgesia  Pt reports she feels well today  No new symptoms complaints  Remains on Doxycycline        Vital Signs:   12/27/22 0742 97 7 °F (36 5 °C) 81 18 167/74 106 94 % None (Room air) Lying   12/27/22 0328 97 7 °F (36 5 °C) 81 18 164/69 99 95 % -- Lying   12/26/22 2302 97 9 °F (36 6 °C) 84 18 181/76 Abnormal  109 94 % -- Lying   12/26/22 2034 -- -- -- -- -- -- None (Room air) --   12/26/22 1500 97 7 °F (36 5 °C) 85 18 162/67 93 93 % None (Room air) Lying   12/26/22 0659 98 °F (36 7 °C) 81 18 170/77 -- 96 % None (Room air) Lying   12/25/22 2300 98 2 °F (36 8 °C) 81 -- 181/74 Abnormal  -- 94 % None (Room air) Lying   12/25/22 2216 -- 80 18 175/79 Abnormal  114 -- -- Lying   12/25/22 1946 -- -- -- -- -- -- None (Room air) --   12/25/22 1428 98 6 °F (37 °C) 84 20 108/78 94 94 % None (Room air) Lying   12/25/22 0715 98 2 °F (36 8 °C) 78 20 184/79 Abnormal  113 95 % None (Room air) Lying     12/22/22 2320 98 4 °F (36 9 °C) 90 20 163/71 98 92 % 20 0 L/min None (Room air) Lying   12/22/22 2133 -- -- -- 164/78 -- -- -- -- -- Lying   12/22/22 1930 -- -- -- -- -- -- -- -- None (Room air) --   12/22/22 1532 99 °F (37 2 °C) 84 20 154/76 89 95 % -- -- -- Lying   12/22/22 0813 98 9 °F (37 2 °C) -- 106 Abnormal  165/84 128 93 % -- -- Nasal cannula Lying     Pertinent Labs/Diagnostic Results:       Results from last 7 days   Lab Units 12/27/22  0455 12/24/22  0459 12/23/22  0656 12/22/22  0539 12/21/22  0636   WBC Thousand/uL 8 29 4 97 4 57 3 85* 3 69*   HEMOGLOBIN g/dL 7 9* 7 9* 7 4* 7 3* 7 8*   HEMATOCRIT % 27 0* 26 1* 24 9* 24 3* 25 7*   PLATELETS Thousands/uL 370 227 200 214 215   NEUTROS ABS Thousands/µL 4 95 3 28 3 24 2 62 2 57         Results from last 7 days   Lab Units 12/24/22  0459 12/23/22  0656 12/22/22  0539 12/21/22  0636   SODIUM mmol/L 141 141 139 140   POTASSIUM mmol/L 3 6 3 4* 3 6 3 3*   CHLORIDE mmol/L 104 104 103 103   CO2 mmol/L 29 27 28 28   ANION GAP mmol/L 8 10 8 9   BUN mg/dL 8 8 10 10   CREATININE mg/dL 0 65 0 65 0 65 0 63   EGFR ml/min/1 73sq m 96 96 96 97   CALCIUM mg/dL 8 1* 7 9* 7 6* 8 0*   MAGNESIUM mg/dL  --   --  1 4* 1 4*   PHOSPHORUS mg/dL  --   --  2 8 3 9     Results from last 7 days   Lab Units 12/22/22  0539 12/21/22  0636   AST U/L 27 23   ALT U/L 13 8*   ALK PHOS U/L 167* 165*   TOTAL PROTEIN g/dL 5 7* 5 9*   ALBUMIN g/dL 1 4* 1 4*   TOTAL BILIRUBIN mg/dL 0 27 0 26     Results from last 7 days   Lab Units 12/27/22  1125 12/27/22  0740 12/26/22 2058 12/26/22  1621 12/26/22  1127 12/26/22  0839 12/25/22  2109 12/25/22  1613 12/25/22  1143 12/25/22  0722 12/24/22  2054 12/24/22  1616   POC GLUCOSE mg/dl 127 107 132 154* 84 101 138 114 101 110 178* 149*     Results from last 7 days   Lab Units 12/24/22  0459 12/23/22  0656 12/22/22  0539 12/21/22  0636   GLUCOSE RANDOM mg/dL 94 108 109 94     Results from last 7 days   Lab Units 12/27/22  0455 12/25/22  0951 12/23/22  0656   PROTIME seconds 33 3* 24 8* 23 4*   INR  3 30* 2 25* 2 09*     Medications:   Scheduled Medications:  amLODIPine, 10 mg, Oral, Daily  atorvastatin, 40 mg, Oral, Daily With Dinner  buPROPion, 150 mg, Oral, Daily  clopidogrel, 75 mg, Oral, Daily  collagenase, , Topical, Daily  doxazosin, 4 mg, Oral, HS  doxycycline hyclate, 100 mg, Oral, Q12H MICHELLE  ferrous sulfate, 325 mg, Oral, Daily With Lunch  gabapentin, 400 mg, Oral, TID  insulin glargine, 10 Units, Subcutaneous, HS  insulin lispro, 1-6 Units, Subcutaneous, TID AC  lisinopril, 40 mg, Oral, Daily  methocarbamol, 1,000 mg, Oral, Q8H MICHELLE  metoprolol succinate, 100 mg, Oral, Daily  nystatin, , Topical, BID  oxyCODONE, 10 mg, Oral, Q12H MICHELLE  pantoprazole, 40 mg, Oral, Early Morning  polyethylene glycol, 17 g, Oral, BID  potassium chloride, 20 mEq, Oral, Daily  QUEtiapine, 25 mg, Oral, HS  senna, 1 tablet, Oral, BID  sertraline, 175 mg, Oral, Daily  warfarin, 4 mg, Oral, Daily (warfarin)      Continuous IV Infusions:     PRN Meds:  acetaminophen, 650 mg, Oral, Q6H PRN  ALPRAZolam, 0 25 mg, Oral, Daily PRN  aluminum-magnesium hydroxide-simethicone, 30 mL, Oral, Q4H PRN - x 1 12/25  labetalol, 10 mg, Intravenous, Q6H PRN  metoclopramide, 10 mg, Intravenous, Q6H PRN  naloxone, 0 04 mg, Intravenous, Q1MIN PRN  ondansetron, 4 mg, Intravenous, Q6H PRN  oxyCODONE, 2 5 mg, Oral, Q4H PRN   Or  oxyCODONE, 5 mg, Oral, Q4H PRN - x 1 12/22, 12/26   Or  oxyCODONE, 7 5 mg, Oral, Q4H PRN - x 1 12/26    Discharge Plan: will need rehab post d/c  Network Utilization Review Department  ATTENTION: Please call with any questions or concerns to [phone] and carefully listen to the prompts so that you are directed to the right person  All voicemails are confidential   Madelon Luis Eduardo all requests for admission clinical reviews, approved or denied determinations and any other requests to dedicated fax number below belonging to the campus where the patient is receiving treatment   List of dedicated fax numbers for the Facilities:  FACILITY NAME UR FAX NUMBER   ADMISSION DENIALS (Administrative/Medical Necessity) [address] [facility] (Maternity/NICU/Pediatrics) [phone]      madhav [address] [facility] [phone]   [facility] [phone]   [address] [place] [address] [facility] Doe Vigil  [phone] [address] [facility] Layla Killian [facility] 134 815 [facility] [phone]

## 2022-12-27 NOTE — PHYSICAL THERAPY NOTE
PHYSICAL THERAPY NOTE          Patient Name: Gill Bowen  JDQOB'I Date: 12/27/2022 12/27/22 1310   Note Type   Note Type Treatment for insurance authorization   Pain Assessment   Pain Assessment Tool 0-10   Pain Score 3   Pain Location/Orientation Orientation: Right;Location: Leg   Hospital Pain Intervention(s) Ambulation/increased activity;Repositioned; Emotional support   Restrictions/Precautions   Weight Bearing Precautions Per Order Yes   Other Precautions Bed Alarm; Chair Alarm;Multiple lines; Fall Risk;Pain  (wound vac R LE)   Cognition   Overall Cognitive Status WFL   Arousal/Participation Alert; Cooperative   Attention Within functional limits   Orientation Level Oriented X4   Memory Decreased short term memory   Following Commands Follows one step commands without difficulty   Comments engages in appropriate conversations, motivated to get better and get to rehab and goal to eventual go home and sit and do a puzzle   Bed Mobility   Rolling R 2  Maximal assistance   Additional items Assist x 2;HOB elevated; Bedrails; Increased time required;LE management;Verbal cues   Rolling L 2  Maximal assistance   Additional items Assist x 2; Increased time required;LE management;Verbal cues; Bedrails;HOB elevated   Supine to Sit 2  Maximal assistance   Additional items Assist x 2; Increased time required; Bedrails;HOB elevated;LE management;Verbal cues   Sit to Supine 2  Maximal assistance   Additional items Assist x 2; Increased time required;Verbal cues;LE management; Bedrails;HOB elevated   Additional Comments sat EOB x2 trials initially requiring assistance to maintain balance then able to maintain support EOB w/ b/l UEs on rails w/ close supervision and max tolerance 7 minutes then w/ fatigue retropulsion   Transfers   Additional Comments pt w/ dependent assist x2 lateral scooting to [address] ,PTA

## 2022-12-27 NOTE — PROGRESS NOTES
[facility]  [facility] 1960, 65 y o  female MRN: [id number]  Unit/Bed#: E4 -01 Encounter: [id number]  Primary Care Provider: ALEX Posada   Date and time admitted to hospital: 10/21/2022  7:58 PM    * PAD (peripheral artery disease) [facility])  Assessment & Plan  65year old female former smoker w/HTN, HLD, uncontrolled type II DM (A1c 9 8), hx CVA, presenting with nonhealing extensive L heel ulceration and R hallux and 5th digit ulceration  Vascular surgery consulted for input  S/p multiple b/l endovascular interventions  JUSTIN  R hemispheric CVA    S/p L AKA 11/23/22 [facility])  S/p R AKA, wound debridement 12/1/22 [facility])  S/p R AKA wound/stump washout and [facility]  S/p R AKA wound/stump VAC change, L AKA stump washout/packing 12/19/22 ([place])      Wound Cx: enterobacter cloacae     Recommendations:  - Bilateral tissue loss in the setting of uncontrolled type II DM and NYDIA on admission, now s/p multiple angiograms w/intervention    - Endovascular interventions, c/b atheroembolic event to the RLE and JUSTIN with R hemispheric CVA  - Now s/p L AKA on 11/23/22 and R AKA 12/1/22 w/ proximal thigh wound debridements    - R AKA stump with incisional dehiscence and thigh wounds requiring debridement  Taken to the OR on 12/16 for washout and VAC placement with return to the OR 12/19 for VAC change  L AKA stump with some drainage, multiple staples removed from the incision and iodoform 1' packing placed in small opening  - Packing to be changed daily by nursing  - Appreciate ID for assistance with abx- L stump cultures growing out enterobacter cloacae  PO doxycycline 100 mg BID for 10 days total  course  - Right AKA wound VAC changed  Wound measurements 15 cm x 4 cm x 2 cm  Continue wound VAC therapy    Change MWF-next change on Wednesday  - Incentive spirometer   - Medical management with statin, Plavix  - Continue coumadin  - Medical management and glucose control per SLIM  - Continue oral pain regimen, adjusted to long acting which appears to be controlling patient's pain well   - Appreciate psych input and management - addition of wellbutrin and adjustment of zoloft   - Disposition planning for rehab: will follow up with CM     Subjective: No acute events overnight  Vitals:  /74 (BP Location: Left arm)   Pulse 81   Temp 97 7 °F (36 5 °C) (Temporal)   Resp 18   Ht 5' 4" (1 626 m)   Wt 69 6 kg (153 lb 7 oz)   SpO2 94%   BMI 26 34 kg/m²     I/Os:  No intake/output data recorded  No intake/output data recorded  Lab Results and Cultures:   CBC with diff:   Lab Results   Component Value Date    WBC 8 29 12/27/2022    HGB 7 9 (L) 12/27/2022    HCT 27 0 (L) 12/27/2022    MCV 87 12/27/2022     12/27/2022    MCH 25 3 (L) 12/27/2022    MCHC 29 3 (L) 12/27/2022    RDW 16 4 (H) 12/27/2022    MPV 8 4 (L) 12/27/2022    NRBC 0 12/27/2022   ,   BMP/CMP:  Lab Results   Component Value Date    SODIUM 141 12/24/2022    K 3 6 12/24/2022     12/24/2022    CO2 29 12/24/2022    BUN 8 12/24/2022    CREATININE 0 65 12/24/2022    CALCIUM 8 1 (L) 12/24/2022    AST 27 12/22/2022    ALT 13 12/22/2022    ALKPHOS 167 (H) 12/22/2022    EGFR 96 12/24/2022   ,   Lipid Panel: No results found for: CHOL,   Coags:   Lab Results   Component Value Date    PTT 95 (H) 12/12/2022    INR 3 30 (H) 12/27/2022   ,     Blood Culture:   Lab Results   Component Value Date    BLOODCX No Growth After 5 Days   11/13/2022    BLOODCX Staphylococcus coagulase negative (A) 11/13/2022   ,   Urinalysis:   Lab Results   Component Value Date    COLORU Yellow 11/12/2022    CLARITYU Slightly Cloudy 11/12/2022    SPECGRAV 1 025 11/12/2022    PHUR 5 5 11/12/2022    PHUR 6 5 02/23/2018    LEUKOCYTESUR Trace (A) 11/12/2022    NITRITE Positive (A) 11/12/2022    GLUCOSEU Negative 11/12/2022    KETONESU Negative 11/12/2022    BILIRUBINUR Negative 11/12/2022    BLOODU Large (A) 11/12/2022   ,   Urine Culture:   Lab Results   Component Value Date    URINECX >100,000 cfu/ml Enterobacter cloacae complex (A) 11/12/2022    URINECX 70,000-79,000 cfu/ml Kluyveromyces marxianus (A) 11/12/2022   ,   Wound Culure: No results found for: WOUNDCULT    Medications:  Current Facility-Administered Medications   Medication Dose Route Frequency   • acetaminophen (TYLENOL) tablet 650 mg  650 mg Oral Q6H PRN   • ALPRAZolam (XANAX) tablet 0 25 mg  0 25 mg Oral Daily PRN   • aluminum-magnesium hydroxide-simethicone (MYLANTA) oral suspension 30 mL  30 mL Oral Q4H PRN   • amLODIPine (NORVASC) tablet 10 mg  10 mg Oral Daily   • atorvastatin (LIPITOR) tablet 40 mg  40 mg Oral Daily With Dinner   • buPROPion (WELLBUTRIN XL) 24 hr tablet 150 mg  150 mg Oral Daily   • clopidogrel (PLAVIX) tablet 75 mg  75 mg Oral Daily   • collagenase (SANTYL) ointment   Topical Daily   • doxazosin (CARDURA) tablet 4 mg  4 mg Oral HS   • doxycycline hyclate (VIBRAMYCIN) capsule 100 mg  100 mg Oral Q12H [address]   • ferrous sulfate tablet 325 mg  325 mg Oral Daily With Lunch   • gabapentin (NEURONTIN) capsule 400 mg  400 mg Oral TID   • insulin glargine (LANTUS) subcutaneous injection 10 Units 0 1 mL  10 Units Subcutaneous HS   • insulin lispro (HumaLOG) 100 units/mL subcutaneous injection 1-6 Units  1-6 Units Subcutaneous TID AC   • labetalol (NORMODYNE) injection 10 mg  10 mg Intravenous Q6H PRN   • lisinopril (ZESTRIL) tablet 40 mg  40 mg Oral Daily   • methocarbamol (ROBAXIN) tablet 1,000 mg  1,000 mg Oral Q8H [address]   • metoclopramide (REGLAN) injection 10 mg  10 mg Intravenous Q6H PRN   • metoprolol succinate (TOPROL-XL) 24 hr tablet 100 mg  100 mg Oral Daily   • naloxone (NARCAN) 0 04 mg/mL syringe 0 04 mg  0 04 mg Intravenous Q1MIN PRN   • nystatin (MYCOSTATIN) powder   Topical BID   • ondansetron (ZOFRAN) injection 4 mg  4 mg Intravenous Q6H PRN   • oxyCODONE (OxyCONTIN) 12 hr tablet 10 mg  10 mg Oral Q12H [address]   • oxyCODONE (ROXICODONE) IR tablet 2 5 mg  2 5 mg Oral Q4H PRN    Or   • oxyCODONE (ROXICODONE) IR tablet 5 mg  5 mg Oral Q4H PRN    Or   • oxyCODONE (ROXICODONE) IR tablet 7 5 mg  7 5 mg Oral Q4H PRN   • pantoprazole (PROTONIX) EC tablet 40 mg  40 mg Oral Early Morning   • polyethylene glycol (MIRALAX) packet 17 g  17 g Oral BID   • potassium chloride (K-DUR,KLOR-CON) CR tablet 20 mEq  20 mEq Oral Daily   • QUEtiapine (SEROquel) tablet 25 mg  25 mg Oral HS   • senna (SENOKOT) tablet 8 6 mg  1 tablet Oral BID   • sertraline (ZOLOFT) tablet 175 mg  175 mg Oral Daily   • warfarin (COUMADIN) tablet 4 mg  4 mg Oral Daily (warfarin)       Imaging:  Reviewed  Physical Exam  Constitutional:       General: She is sleeping  She is not in acute distress  Appearance: She is not ill-appearing, toxic-appearing or diaphoretic  HENT:      Head: Normocephalic and atraumatic  Musculoskeletal:      Right Lower Extremity: Right leg is amputated above knee  Left Lower Extremity: Left leg is amputated above knee

## 2022-12-27 NOTE — PLAN OF CARE
Problem: PHYSICAL THERAPY ADULT  Goal: Performs mobility at highest level of function for planned discharge setting  See evaluation for individualized goals  Description: Treatment/Interventions: Functional transfer training, LE strengthening/ROM, Therapeutic exercise, Endurance training, Patient/family training, Equipment eval/education, Bed mobility, Compensatory technique education, Gait training, Continued evaluation, Spoke to nursing, Spoke to MD, Spoke to case management, OT          See flowsheet documentation for full assessment, interventions and recommendations  Outcome: Progressing  Note: Prognosis: Guarded  Problem List: Decreased strength, Decreased endurance, Impaired balance, Decreased mobility, Decreased skin integrity, Impaired sensation, Pain  Assessment: Pt  Rec'd supine in bed  Pt in good spirits, agreeable to PT  Pt  Reports 3/10 R le pain  Pt performs bed mobility of rolling to the R, supine to sit and sit to supine with max assist x2  Pt perform sitting balance/ activity tolerance trials eob x2 first trial pt  Required dependant assist x2 due to heavy posterior lean, pt unable to right self to midline  tolerated  X 3 minutes  Second sitting trial pt  required max assist x 2 upon sitting eob progressing to close supervision with use of b/l bed rails for support  Pt able to maintain upright sitting balance and midline with b/l us support  Noted posterior lean with fatigue, pt requiring verbal cues to right self to midline without assist with use of b/l bedrails  Attempted sitting with unilateral ue support noting posterior lob requiring max assist to correct  Pt  Performs supine b/l le arom exercises of SLR, hip abd /add and GS pt tolerated fair  Pt  Performed seated lateral side scoots toward head of bed with dependant assist x2 and dependant assist x2 to boost pt  up in bed  Pt  Showing improved participation and motivation this session    Pt  Will benefit from continued inpt PT and rehab at d/c   Barriers to Discharge: Inaccessible home environment, Decreased caregiver support  Barriers to Discharge Comments: steps, lives alone  PT Discharge Recommendation: Post acute rehabilitation services    See flowsheet documentation for full assessment

## 2022-12-27 NOTE — ASSESSMENT & PLAN NOTE
· Noted to have change in mental status on 11/13/2022    · CT confirmed moderate right parietal lobe stroke, likely embolic in the setting of HIT with thrombosis  · She was treated with argatroban/coumadin bridge  · INR 3 3 will give warfarin 3 mg today, monitor INR closely  · Goal INR 2-3

## 2022-12-27 NOTE — PROGRESS NOTES
Progress Note - Infectious Disease   Amandeep Ro 65 y o  female MRN: [id number]  Unit/Bed#: E4 -01 Encounter: [id number]    Impression/Plan:  1  Left AKA Stump Site Abscess  Patient is s/p OR on 12/19/2022 with abscess cavity found  Purulent drainage sent for culture, growing Enterobacter cloacae  Discussed with vascular team, no current concern for bone infection but rather soft tissue only  Patient has been transitioned to Doxycycline which she is tolerating without difficulty  I will continue for now in anticipation of patient completing 10 days of antibiotic treatment   -continue PO Doxycycline through 1/2/2023 for 10 days total antibiotic treatment  -monitor CBCD and BMP  -monitor vitals  -serial stump exams  -local wound care/VAC care per vascular surgery  -continue follow up with vascular surgery    2  Recent fever  Occurring on 12/19/2022, though appears to correlate post op from her right AKA wound VAC change and left AKA stump site I&D  Suspect reactive to surgery and possibly to the infection of left AKA site  Fever curve has trended down and WBC count is normal    -antibiotic as above  -monitor CBCD  -monitor vitals    3  Severe PAD  Complicated by bilateral atheroembolic events and limb ischemia  Patient is s/p bilateral AKA      4  Right Hemispheric CVA  New acute stroke noted on head CT on 11/13/2022   -supportive care      5  Type 2 diabetes mellitus with long term insulin use  Patient's last HbA1c was 9 8% on 10/25/2022  Elevated blood glucose is risk factor for infection  Recommend tight glycemic control  SLIM is on board for medical management   -blood glucose management per SLIM    Above plan was discussed in detail with patient at the bedside  Above plan was discussed in detail with vascular surgery  Antibiotics:  Doxycycline 5  Antibiotics 6    Subjective:  Patient reports she's feeling well today  Has no concern with her B/L stump sites, no concern with the R stump VAC   She has no fever, chills, sweats, shakes; no nausea, vomiting, abdominal pain, diarrhea, or dysuria; no cough, shortness of breath, or chest pain  No new symptoms  Objective:  Vitals:  Temp:  [97 7 °F (36 5 °C)-97 9 °F (36 6 °C)] 97 7 °F (36 5 °C)  HR:  [81-85] 81  Resp:  [18] 18  BP: (162-181)/(67-76) 167/74  SpO2:  [93 %-95 %] 94 %  Temp (24hrs), Av 8 °F (36 6 °C), Min:97 7 °F (36 5 °C), Max:97 9 °F (36 6 °C)  Current: Temperature: 97 7 °F (36 5 °C)    Physical Exam:   General Appearance:  Alert, interactive, nontoxic, no acute distress  She appears comfortable sitting up in bed  Throat: Oropharynx moist without lesions  Lungs:   Clear to auscultation bilaterally; no wheezes, rhonchi or rales; respirations unlabored on room air  Heart:  RRR; no murmur, rub or gallop  Abdomen:   Soft, non-tender, non-distended, positive bowel sounds  Extremities: L AKA stump site with some intact staples, partially dressed with no active breakthrough bleeding/leakage/weeping, no spreading erythema from site  R AKA stump VAC dressing intact without leakage, no spreading erythema from site  Skin: No new rashes or lesions noted on exposed skin  Labs, Imaging, & Other studies:   All pertinent labs and imaging studies were personally reviewed  Results from last 7 days   Lab Units 22  0455 22  0459 22  0656   WBC Thousand/uL 8 29 4 97 4 57   HEMOGLOBIN g/dL 7 9* 7 9* 7 4*   PLATELETS Thousands/uL 370 227 200     Results from last 7 days   Lab Units 22  0459 22  0656 22  0539 22  0636   POTASSIUM mmol/L 3 6   < > 3 6 3 3*   CHLORIDE mmol/L 104   < > 103 103   CO2 mmol/L 29   < > 28 28   BUN mg/dL 8   < > 10 10   CREATININE mg/dL 0 65   < > 0 65 0 63   EGFR ml/min/1 73sq m 96   < > 96 97   CALCIUM mg/dL 8 1*   < > 7 6* 8 0*   AST U/L  --   --  27 23   ALT U/L  --   --  13 8*   ALK PHOS U/L  --   --  167* 165*    < > = values in this interval not displayed

## 2022-12-27 NOTE — ASSESSMENT & PLAN NOTE
Lab Results   Component Value Date    HGBA1C 9 8 (H) 10/25/2022     Recent Labs     12/26/22  1127 12/26/22  1621 12/26/22 2058 12/27/22  0740   POCGLU 84 154* 132 107     · Significantly reduced regimen from home due to poor oral intake  · Goal sugar < 180  · Continue Lantus to 10 units at night  · Continue sliding scale insulin

## 2022-12-27 NOTE — PLAN OF CARE
Problem: OCCUPATIONAL THERAPY ADULT  Goal: Performs self-care activities at highest level of function for planned discharge setting  See evaluation for individualized goals  Description: Treatment Interventions: ADL retraining, Functional transfer training, UE strengthening/ROM, Endurance training, Cognitive reorientation, Patient/family training, Equipment evaluation/education, Compensatory technique education, Energy conservation, Activityengagement          See flowsheet documentation for full assessment, interventions and recommendations  Outcome: Progressing  Note: Limitation: Decreased ADL status, Decreased UE strength, Decreased endurance, Decreased high-level ADLs (New L visual field deficits)  Prognosis: Good  Assessment: Pt seen for skilled OT session focused on ADLs, bed mobility, EOB sitting, UE exercises  Pt motivated to participate  Pt w/ MAX assist x2 supine>sit bed mobility to EOB and w/ max assist initially to maintain balance and sitting tolerance of 3 minutes, assisted to return to supine due to repositioning to have b/l UE support on bedrails  Pt w/ MAX assist x2 supine>sit bed mobility w/ increased time and initially mod assist to maintain balance and then w/ b/l UE supports on rails able to maintain balance w/ min cues for positioning to maintain balance  Pt w/ setup to wash face and min assist to brush hair  Pt w/ max sitting tolerance x 7 minutes and w/ increased fatigue  Pt w/ dependent assist x2 w/ attempts to laterally scoot to [facility]  Pt w/ max assist x2 sit>supine bed mobility  Pt assist to reposition upright in bed  Pt w/ MIN assist to don/[facility] go, max assist LB ADLs  Pt completed b/l UE exercises 2 sets x 15 reps w/ cues for proper techniques to increase strength and endurance for ADLs, functional transfers and mobility  Pt seated upright in bed end of session w/ all needs met  Pt w/ increased motivation, sitting tolerance and improvement in sitting balance this session  Pt continues to be limited due to increased pain, impaired balance, impaired functional reach, decreased strength and endurance, decreased activity tolerance all causing a decline in ADLs, functional transfers and mobility  Recommend STR when medically stable  Will continue to follow  The patient's raw score on the AM-PAC Daily Activity inpatient short form is 13, standardized score is 32 03, less than 39 4  Patients at this level are likely to benefit from discharge to post-acute rehabilitation services  Please refer to the recommendation of the Occupational Therapist for safe discharge planning       OT Discharge Recommendation: Post acute rehabilitation services

## 2022-12-27 NOTE — OCCUPATIONAL THERAPY NOTE
Occupational Therapy Progress Note     Patient Name: Daniel Shah  [id number] Date: 12/27/2022  Problem List  Principal Problem:    PAD (peripheral artery disease) ([facility] )  Active Problems:    Depression, recurrent ([place] [id number] )    Benign essential hypertension    Stroke ([place] [id number] )    Type 2 diabetes mellitus with hyperglycemia, with long-term current use of insulin (HCC)    Acute on chronic anemia    Diabetic ulcer of heel associated with diabetes mellitus due to underlying condition [facility])    HIT (heparin-induced thrombocytopenia)    Febrile            12/27/22 1310   OT Last Visit   OT Visit Date 12/27/22   Note Type   Note Type Treatment for insurance authorization   Pain Assessment   Pain Assessment Tool 0-10   Pain Score 3   Pain Location/Orientation Orientation: Right;Location: Leg   Hospital Pain Intervention(s) Ambulation/increased activity;Repositioned; Emotional support   Restrictions/Precautions   Weight Bearing Precautions Per Order Yes   Other Precautions Bed Alarm; Chair Alarm;Multiple lines; Fall Risk;Pain  (wound vac R LE)   ADL   Where Assessed Edge of bed   Grooming Assistance 4  Minimal Assistance   Grooming Deficit Setup;Verbal cueing;Supervision/safety; Increased time to complete;Wash/dry hands; Wash/dry face;Brushing hair   Grooming Comments assist w/ brushing hair while EOB; setup upright in bed to wash face   UB Bathing Assistance 3  Moderate Assistance   UB Bathing Deficit Setup;Steadying;Verbal cueing;Supervision/safety; Increased time to complete   LB Bathing Assistance 1  Total Assistance   LB Bathing Deficit Steadying;Setup; Increased time to complete;Supervision/safety   UB Dressing Assistance 4  Minimal Assistance   UB Dressing Deficit Setup;Steadying;Supervision/safety;Verbal cueing; Increased time to complete   LB Dressing Assistance 1  Total Assistance   LB Dressing Deficit Setup;Steadying; Requires assistive device for steadying;Verbal cueing;Supervision/safety; Increased time to complete   Bed Mobility Rolling R 2  Maximal assistance   Additional items Assist x 2;HOB elevated; Bedrails; Increased time required;LE management;Verbal cues   Rolling L 2  Maximal assistance   Additional items Assist x 2; Increased time required;LE management;Verbal cues; Bedrails;HOB elevated   Supine to Sit 2  Maximal assistance   Additional items Assist x 2; Increased time required; Bedrails;HOB elevated;LE management;Verbal cues   Sit to Supine 2  Maximal assistance   Additional items Assist x 2; Increased time required;Verbal cues;LE management; Bedrails;HOB elevated   Additional Comments sat EOB x2 trials initially requiring assistance to maintain balance then able to maintain support EOB w/ b/l UEs on rails w/ close supervision and max tolerance 7 minutes then w/ fatigue retropulsion   Transfers   Additional Comments pt w/ dependent assist x2 lateral scooting to [facility]   Functional Mobility   Additional Comments continue to recommend andree lift for OOB   Therapeutic Exercise - ROM   UE-ROM Yes   ROM- Right Upper Extremities   R Shoulder AROM; Flexion; Extension;Horizontal ABduction   R Elbow AROM;Elbow flexion;Elbow extension  (forearm pronation/supination, punchouts)   Equipment   (use of 16oz bottles as weights)   R Weight/Reps/Sets 2 sets x 15 reps   RUE ROM Comment tolerated well   ROM - Left Upper Extremities    L Shoulder AROM; Flexion; Extension   L Elbow AROM;Elbow extension;Elbow flexion  (forearm pronation/supination, punchouts)   Equipment   (use of 16oz bottles as weights)   L Weight/Reps/Sets 2 sets x15 reps   LUE ROM Comment tolerated well   Subjective   Subjective "My family all came to visit on romain"   Cognition   Overall Cognitive Status [facility]   Arousal/Participation Alert; Cooperative   Attention Within functional limits   Orientation Level Oriented X4   Memory Decreased short term memory   Following Commands Follows one step commands without difficulty   Comments engages in appropriate conversations, motivated to get better and get to rehab and goal to eventual go home and sit and do a puzzle   Additional Activities   Additional Activities   (education on positioning)   Additional Activities Comments pt receptive   Activity Tolerance   Activity Tolerance Patient limited by fatigue;Patient limited by pain;Treatment limited secondary to medical complications (Comment)   Medical Staff Made Aware appropriate to see per Willow HERNÁNDEZ   Assessment   Assessment Pt seen for skilled OT session focused on ADLs, bed mobility, EOB sitting, UE exercises  Pt motivated to participate  Pt w/ MAX assist x2 supine>sit bed mobility to EOB and w/ max assist initially to maintain balance and sitting tolerance of 3 minutes, assisted to return to supine due to repositioning to have b/l UE support on bedrails  Pt w/ MAX assist x2 supine>sit bed mobility w/ increased time and initially mod assist to maintain balance and then w/ b/l UE supports on rails able to maintain balance w/ min cues for positioning to maintain balance  Pt w/ setup to wash face and min assist to brush hair  Pt w/ max sitting tolerance x 7 minutes and w/ increased fatigue  Pt w/ dependent assist x2 w/ attempts to laterally scoot to [facility]  Pt w/ max assist x2 sit>supine bed mobility  Pt assist to reposition upright in bed  Pt w/ MIN assist to don/doff hospital gown, max assist LB ADLs  Pt completed b/l UE exercises 2 sets x 15 reps w/ cues for proper techniques to increase strength and endurance for ADLs, functional transfers and mobility  Pt seated upright in bed end of session w/ all needs met  Pt w/ increased motivation, sitting tolerance and improvement in sitting balance this session  Pt continues to be limited due to increased pain, impaired balance, impaired functional reach, decreased strength and endurance, decreased activity tolerance all causing a decline in ADLs, functional transfers and mobility  Recommend STR when medically stable  Will continue to follow   The patient's raw score on the AM-PAC Daily Activity inpatient short form is 13, standardized score is 32 03, less than 39 4  Patients at this level are likely to benefit from discharge to post-acute rehabilitation services  Please refer to the recommendation of the Occupational Therapist for safe discharge planning  Plan   Treatment Interventions ADL retraining; Endurance training;UE strengthening/ROM; Functional transfer training;Cognitive reorientation;Equipment evaluation/education;Patient/family training; Compensatory technique education; Activityengagement; Energy conservation   Goal Expiration Date 01/06/23   OT Treatment Day 17   OT Frequency 3-5x/wk   Recommendation   OT Discharge Recommendation Post acute rehabilitation services   Additional Comments  Pt seen for co-session with skilled Physical therapist 2* clinically unstable presentation, medical complexity, new precautions, performance deficits/functional limitations, impaired cognition/safety awareness, limited activity tolerance and present impairments which are a regression from patient patient's baseline and impacting overall occupational performance   AM-PAC Daily Activity Inpatient   Lower Body Dressing 2   Bathing 2   Toileting 1   Upper Body Dressing 2   Grooming 3   Eating 3   Daily Activity Raw Score 13   Daily Activity Standardized Score (Calc for Raw Score >=11) 32 03   AM-PAC Applied Cognition Inpatient   Following a Speech/Presentation 4   Understanding Ordinary Conversation 4   Taking Medications 3   Remembering Where Things Are Placed or Put Away 3   Remembering List of 4-5 Errands 3   Taking Care of Complicated Tasks 2   Applied Cognition Raw Score 19   Applied Cognition Standardized Score 39 77   End of Consult   Education Provided Yes   Patient Position at End of Consult All needs within reach;Bed/Chair alarm activated  (upright in bed)   Nurse Communication Nurse aware of consult     Documentation completed by: Erika Hoskins MS, OTR/L

## 2022-12-28 LAB
GLUCOSE SERPL-MCNC: 114 MG/DL (ref 65–140)
GLUCOSE SERPL-MCNC: 142 MG/DL (ref 65–140)
GLUCOSE SERPL-MCNC: 159 MG/DL (ref 65–140)
GLUCOSE SERPL-MCNC: 98 MG/DL (ref 65–140)
INR PPP: 2.68 (ref 0.84–1.19)
PROTHROMBIN TIME: 28.4 SECONDS (ref 11.6–14.5)

## 2022-12-28 PROCEDURE — 85610 PROTHROMBIN TIME: CPT | Performed by: INTERNAL MEDICINE

## 2022-12-28 PROCEDURE — 99232 SBSQ HOSP IP/OBS MODERATE 35: CPT | Performed by: INTERNAL MEDICINE

## 2022-12-28 PROCEDURE — 82948 REAGENT STRIP/BLOOD GLUCOSE: CPT

## 2022-12-28 PROCEDURE — 99024 POSTOP FOLLOW-UP VISIT: CPT | Performed by: NURSE PRACTITIONER

## 2022-12-28 RX ORDER — HYDROMORPHONE HCL/PF 1 MG/ML
0.5 SYRINGE (ML) INJECTION ONCE
Status: COMPLETED | OUTPATIENT
Start: 2022-12-28 | End: 2022-12-28

## 2022-12-28 RX ADMIN — QUETIAPINE FUMARATE 25 MG: 25 TABLET ORAL at 22:32

## 2022-12-28 RX ADMIN — AMLODIPINE BESYLATE 10 MG: 10 TABLET ORAL at 09:08

## 2022-12-28 RX ADMIN — GABAPENTIN 400 MG: 400 CAPSULE ORAL at 15:59

## 2022-12-28 RX ADMIN — METHOCARBAMOL 1000 MG: 500 TABLET ORAL at 22:32

## 2022-12-28 RX ADMIN — CLOPIDOGREL BISULFATE 75 MG: 75 TABLET ORAL at 09:08

## 2022-12-28 RX ADMIN — DOXYCYCLINE 100 MG: 100 CAPSULE ORAL at 09:08

## 2022-12-28 RX ADMIN — POTASSIUM CHLORIDE 20 MEQ: 1500 TABLET, EXTENDED RELEASE ORAL at 09:08

## 2022-12-28 RX ADMIN — OXYCODONE HYDROCHLORIDE 10 MG: 10 TABLET, FILM COATED, EXTENDED RELEASE ORAL at 09:07

## 2022-12-28 RX ADMIN — METHOCARBAMOL 1000 MG: 500 TABLET ORAL at 13:29

## 2022-12-28 RX ADMIN — PANTOPRAZOLE SODIUM 40 MG: 40 TABLET, DELAYED RELEASE ORAL at 05:08

## 2022-12-28 RX ADMIN — INSULIN GLARGINE 10 UNITS: 100 INJECTION, SOLUTION SUBCUTANEOUS at 22:31

## 2022-12-28 RX ADMIN — ALUMINUM HYDROXIDE, MAGNESIUM HYDROXIDE, AND SIMETHICONE 30 ML: 200; 200; 20 SUSPENSION ORAL at 09:27

## 2022-12-28 RX ADMIN — SERTRALINE HYDROCHLORIDE 175 MG: 100 TABLET ORAL at 09:06

## 2022-12-28 RX ADMIN — WARFARIN SODIUM 3 MG: 3 TABLET ORAL at 18:11

## 2022-12-28 RX ADMIN — GABAPENTIN 400 MG: 400 CAPSULE ORAL at 09:06

## 2022-12-28 RX ADMIN — NYSTATIN: 100000 POWDER TOPICAL at 18:11

## 2022-12-28 RX ADMIN — INSULIN LISPRO 1 UNITS: 100 INJECTION, SOLUTION INTRAVENOUS; SUBCUTANEOUS at 16:37

## 2022-12-28 RX ADMIN — DOXYCYCLINE 100 MG: 100 CAPSULE ORAL at 22:32

## 2022-12-28 RX ADMIN — ATORVASTATIN CALCIUM 40 MG: 40 TABLET, FILM COATED ORAL at 15:59

## 2022-12-28 RX ADMIN — GABAPENTIN 400 MG: 400 CAPSULE ORAL at 22:31

## 2022-12-28 RX ADMIN — OXYCODONE HYDROCHLORIDE 10 MG: 10 TABLET, FILM COATED, EXTENDED RELEASE ORAL at 22:31

## 2022-12-28 RX ADMIN — BUPROPION HYDROCHLORIDE 150 MG: 150 TABLET, FILM COATED, EXTENDED RELEASE ORAL at 09:06

## 2022-12-28 RX ADMIN — POLYETHYLENE GLYCOL 3350 17 G: 17 POWDER, FOR SOLUTION ORAL at 22:32

## 2022-12-28 RX ADMIN — NYSTATIN: 100000 POWDER TOPICAL at 09:16

## 2022-12-28 RX ADMIN — HYDROMORPHONE HYDROCHLORIDE 0.5 MG: 1 INJECTION, SOLUTION INTRAMUSCULAR; INTRAVENOUS; SUBCUTANEOUS at 13:27

## 2022-12-28 RX ADMIN — DOXAZOSIN 4 MG: 4 TABLET ORAL at 22:33

## 2022-12-28 RX ADMIN — METOPROLOL SUCCINATE 100 MG: 100 TABLET, FILM COATED, EXTENDED RELEASE ORAL at 09:06

## 2022-12-28 RX ADMIN — FERROUS SULFATE TAB 325 MG (65 MG ELEMENTAL FE) 325 MG: 325 (65 FE) TAB at 12:35

## 2022-12-28 RX ADMIN — LISINOPRIL 40 MG: 20 TABLET ORAL at 09:06

## 2022-12-28 NOTE — ASSESSMENT & PLAN NOTE
· Noted to have change in mental status on 11/13/2022    · CT confirmed moderate right parietal lobe stroke, likely embolic in the setting of HIT with thrombosis  · She was treated with argatroban/coumadin bridge  · INR 2 68 will give warfarin 3 mg today, monitor INR closely  · Goal INR 2-3

## 2022-12-28 NOTE — UTILIZATION REVIEW
12/28 Vascular Surgery Progress Note and Case Management Discharge Planning Note      Date: 12/28/22      Vascular Surgery Progress Notes      Date of Service:  12/28/2022  1:35 PM               Vanessa [address]  Progress Note - [address] 1960, 65 y o  female MRN: [id number]  Unit/Bed#: E4 -01 Encounter: [id number]  Primary Care Provider: ALEX Macias   Date and time admitted to hospital: 10/21/2022  7:58 PM     * PAD (peripheral artery disease) ([facility] [address] )  Assessment & Plan  65year old female former smoker w/HTN, HLD, uncontrolled type II DM (A1c 9 8), hx CVA, presenting with nonhealing extensive L heel ulceration and R hallux and 5th digit ulceration  Vascular surgery consulted for input      S/p multiple b/l endovascular interventions  JUSTIN  R hemispheric CVA     S/p L AKA 11/23/22 [facility])  S/p R AKA, wound debridement 12/1/22 ([facility])  S/p R AKA wound/stump washout and VAC placement 12/16/22 [facility])  S/p R AKA wound/stump VAC change, L AKA stump washout/packing 12/19/22 [facility])        Wound Cx: enterobacter cloacae      Recommendations:  - Bilateral tissue loss in the setting of uncontrolled type II DM and NYDIA on admission, now s/p multiple angiograms w/intervention    - Endovascular interventions, c/b atheroembolic event to the RLE and JUSTIN with R hemispheric CVA  - Now s/p L AKA on 11/23/22 and R AKA 12/1/22 w/ proximal thigh wound debridements    - R AKA stump with incisional dehiscence and thigh wounds requiring debridement  Taken to the OR on 12/16 for washout and VAC placement with return to the OR 12/19 for VAC change  - L AKA stump incision dehiscence now closed  Continue local wound care  - Appreciate ID for assistance with abx- L stump cultures growing out enterobacter cloacae  Patient stable on current plan per ID- doxycycline through 1/2/23  - Right AKA wound VAC changed 12/28/22  Continue wound VAC therapy  Continuous suction 125 mmHg  change MWF-next change on Friday 12/30/22  - Incentive spirometer   - Medical management with statin, Plavix  - Continue coumadin  - Medical management and glucose control per SLIM  - Continue oral pain regimen, adjusted to long acting which appears to be controlling patient's pain well   - Appreciate psych input and management - addition of wellbutrin and adjustment of zoloft   - Disposition planning for rehab: will follow up with CM   - d/w Dr oSlange Roman        Subjective:  Resting comfortably in bed  Premedicated w/ Dilaudid 0 5 mg IV prior to VAC change  Patient tolerated well      Vitals:  /81 (BP Location: Left arm)   Pulse 77   Temp 98 °F (36 7 °C) (Temporal)   Resp 18   Ht 5' 4" (1 626 m)   Wt 69 4 kg (153 lb)   SpO2 96%   BMI 26 26 kg/m²      I/Os:  I/O last 3 completed shifts:  In: -   Out: 900 [Urine:900]  I/O this shift:  In: -   Out: 950 [Urine:950]     Lab Results and Cultures:         Lab Results   Component Value Date     WBC 8 29 12/27/2022     HGB 7 9 (L) 12/27/2022     HCT 27 0 (L) 12/27/2022     MCV 87 12/27/2022      12/27/2022            Lab Results   Component Value Date     CALCIUM 8 1 (L) 12/24/2022     K 3 6 12/24/2022     CO2 29 12/24/2022      12/24/2022     BUN 8 12/24/2022     CREATININE 0 65 12/24/2022            Lab Results   Component Value Date     INR 2 68 (H) 12/28/2022     INR 3 30 (H) 12/27/2022     INR 2 25 (H) 12/25/2022     PROTIME 28 4 (H) 12/28/2022     PROTIME 33 3 (H) 12/27/2022     PROTIME 24 8 (H) 12/25/2022         Blood Culture:         Lab Results   Component Value Date     BLOODCX No Growth After 5 Days   11/13/2022     BLOODCX Staphylococcus coagulase negative (A) 11/13/2022   ,   Urinalysis:         Lab Results   Component Value Date     COLORU Yellow 11/12/2022     CLARITYU Slightly Cloudy 11/12/2022     SPECGRAV 1 025 11/12/2022     PHUR 5 5 11/12/2022     PHUR 6 5 02/23/2018     LEUKOCYTESUR Trace (A) 11/12/2022     NITRITE Positive (A) 11/12/2022     GLUCOSEU Negative 11/12/2022     KETONESU Negative 11/12/2022     BILIRUBINUR Negative 11/12/2022     BLOODU Large (A) 11/12/2022   ,   Urine Culture:         Lab Results   Component Value Date     URINECX >100,000 cfu/ml Enterobacter cloacae complex (A) 11/12/2022     URINECX 70,000-79,000 cfu/ml Kluyveromyces marxianus (A) 11/12/2022   ,   Wound Culure: No results found for: WOUNDCULT     Medications:  Current Medications          Current Facility-Administered Medications   Medication Dose Route Frequency   • acetaminophen (TYLENOL) tablet 650 mg  650 mg Oral Q6H PRN   • ALPRAZolam (XANAX) tablet 0 25 mg  0 25 mg Oral Daily PRN   • aluminum-magnesium hydroxide-simethicone (MYLANTA) oral suspension 30 mL  30 mL Oral Q4H PRN   • amLODIPine (NORVASC) tablet 10 mg  10 mg Oral Daily   • atorvastatin (LIPITOR) tablet 40 mg  40 mg Oral Daily With Dinner   • buPROPion (WELLBUTRIN XL) 24 hr tablet 150 mg  150 mg Oral Daily   • clopidogrel (PLAVIX) tablet 75 mg  75 mg Oral Daily   • collagenase (SANTYL) ointment   Topical Daily   • doxazosin (CARDURA) tablet 4 mg  4 mg Oral HS   • doxycycline hyclate (VIBRAMYCIN) capsule 100 mg  100 mg Oral Q12H [address]   • ferrous sulfate tablet 325 mg  325 mg Oral Daily With Lunch   • gabapentin (NEURONTIN) capsule 400 mg  400 mg Oral TID   • insulin glargine (LANTUS) subcutaneous injection 10 Units 0 1 mL  10 Units Subcutaneous HS   • insulin lispro (HumaLOG) 100 units/mL subcutaneous injection 1-6 Units  1-6 Units Subcutaneous TID AC   • labetalol (NORMODYNE) injection 10 mg  10 mg Intravenous Q6H PRN   • lisinopril (ZESTRIL) tablet 40 mg  40 mg Oral Daily   • methocarbamol (ROBAXIN) tablet 1,000 mg  1,000 mg Oral Q8H [address]   • metoclopramide (REGLAN) injection 10 mg  10 mg Intravenous Q6H PRN   • metoprolol succinate (TOPROL-XL) 24 hr tablet 100 mg  100 mg Oral Daily   • naloxone (NARCAN) 0 04 mg/mL syringe 0 04 mg  0 04 mg Intravenous Q1MIN PRN   • nystatin (MYCOSTATIN) powder   Topical BID   • ondansetron (ZOFRAN) injection 4 mg  4 mg Intravenous Q6H PRN   • oxyCODONE (OxyCONTIN) 12 hr tablet 10 mg  10 mg Oral Q12H [address]   • oxyCODONE (ROXICODONE) IR tablet 2 5 mg  2 5 mg Oral Q4H PRN     Or   • oxyCODONE (ROXICODONE) IR tablet 5 mg  5 mg Oral Q4H PRN     Or   • oxyCODONE (ROXICODONE) IR tablet 7 5 mg  7 5 mg Oral Q4H PRN   • pantoprazole (PROTONIX) EC tablet 40 mg  40 mg Oral Early Morning   • polyethylene glycol (MIRALAX) packet 17 g  17 g Oral BID   • potassium chloride (K-DUR,KLOR-CON) CR tablet 20 mEq  20 mEq Oral Daily   • QUEtiapine (SEROquel) tablet 25 mg  25 mg Oral HS   • senna (SENOKOT) tablet 8 6 mg  1 tablet Oral BID   • sertraline (ZOLOFT) tablet 175 mg  175 mg Oral Daily   • warfarin (COUMADIN) tablet 3 mg  3 mg Oral Daily (warfarin)            Imaging:  As above      Wound/Incision:     1  L AKA-CDI with staples  No dehiscence, drainage or bleeding  Superficial dehiscence now closed  Painted with Betadine, dry gauze dressing placed        2  R AKA     Bedside VAC change today 12/28/22:        Lateral wound        Sponges removed:  4 black sponges removed     Clinical picture above  Applied Santyl to small area of slough      4 black sponges total placed (3 to wound bed, 1 bridge)     VAC settings 125 mmHg, continuous suction     Physical Exam:     General appearance: alert and oriented, in no acute distress  Neurologic: Grossly normal  Lungs: clear to auscultation bilaterally  Heart: regular rate and rhythm, S1, S2 normal, no murmur, click, rub or gallop  Abdomen: soft, non-tender; bowel sounds normal; no masses,  no organomegaly  Extremities: Bilateral AKA    See wound description above        Pulse exam:  Radial: Right: 2+ Left[de-identified] 2+        ALEX Castellano  12/28/2022  [facility], [phone]                                  Case Management Discharge Planning Note     Patient name Amandeep Ro  Location [facility] /E4 -* MRN [id number]  : 1960 Date 2022         Current Admission Date: 10/21/2022  Current Admission Diagnosis:PAD (peripheral artery disease) [facility])   Problem List        Patient Active Problem List     Diagnosis Date Noted   • Mild protein-calorie malnutrition ([facility] ) 2022   • Diarrhea 2022   • CVA (cerebral vascular accident) ([facility] ) 2022   • Febrile 2022   • HIT (heparin-induced thrombocytopenia) 2022   • Diabetic ulcer of heel associated with diabetes mellitus due to underlying condition ([address] ) 11/10/2022   • Acute on chronic anemia 10/30/2022   • Generalized edema due to fluid overload 10/27/2022   • PAD (peripheral artery disease) ([facility] [address] ) 10/25/2022   • Non healing left heel wound 10/22/2022   • Hypertensive urgency 10/22/2022   • Hypokalemia 10/22/2022   • Wound of lower extremity 10/21/2022   • Epigastric pain 2022   • Type 2 diabetes mellitus with hyperglycemia, with long-term current use of insulin ([facility] ) 10/18/2021   • Hyperparathyroidism ([facility] [address] ) 10/18/2021   • 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 ([facility] ) 2021   • 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   • Moderate protein-calorie malnutrition ([facility] ) 2020   • Acute colitis 2020   • Arthritis 2019   • Depression, recurrent ([facility] ) 2018   • Class 3 severe obesity due to excess calories with serious comorbidity and body mass index (BMI) of 40 0 to 44 9 in adult ([facility] ) 2018   • Esophageal reflux 2018   • Uncontrolled type 2 diabetes with neuropathy 2018   • Diabetic peripheral neuropathy ([facility] )    • Systolic murmur    • Stroke ([facility] ) 2015   • Anxiety 2015   • Vitamin D deficiency 2015   • Benign essential hypertension 2012   • Familial combined hyperlipidemia 2012          LOS (days): 79  Geometric Mean LOS (GMLOS) (days):   Days to GMLOS:      OBJECTIVE:  Risk of Unplanned Readmission Score: 33 8   Current admission status: Inpatient   Preferred Pharmacy:   [id number] - [address]  [address]  [facility] [address]  Phone: [phone] Fax: [phone]     Primary Care Provider: Stanford Rausch     Primary Insurance: [address]  Secondary Insurance:      DISCHARGE DETAILS:     Accepting Facility Name, [facility] : [address] [facility] [address] [facility], [address]  Receiving Facility/Agency Phone Number: Phone: [phone]  Facility/Agency Fax Number: Fax: [phone]  DC plan remains for patient to go to [facility]  Auth has been started by facility- awaiting determination  CM will continue to follow patient       UPDATE- Per [facility] liaison, Authorization was not submitted due to facility being non-par with insurance  Sunita Samuels had an option to request and OON authorization however patients Primary BC insurance plan takes 72hrs- 1 week for determination of OON auth  Patients Primary BC plan terms 12/31/22, so if OON auth started the plan would be inactive by determination date  Daughter told Sunita Samuels that they can bill MA but denial needed from [facility] plan  MA plan does not go into effect until 1/15/23  CM enlarged distance for STR referrals in [place]  CM will continue to follow patient           Vital Signs:       Date/Time Temp Pulse Resp BP MAP (mmHg) SpO2 O2 Device   12/28/22 0734 98 °F (36 7 °C) 77 18 155/81 107 96 % None (Room air)   12/27/22 2246 98 6 °F (37 °C) 86 18 152/67 96 95 % None (Room air)   12/27/22 1908 -- -- -- -- -- -- None (Room air)   12/27/22 1520 98 °F (36 7 °C) 85 18 135/71 88 95 % --   12/27/22 0742 97 7 °F (36 5 °C) 81 18 167/74 106 94 % None (Room air)   12/27/22 0328 97 7 °F (36 5 °C) 81 18 164/69 99 95 % --   12/26/22 2302 97 9 °F (36 6 °C) 84 18 181/76 Abnormal  109 94 % --   12/26/22 2034 -- -- -- -- -- -- None (Room air)   12/26/22 1500 97 7 °F (36 5 °C) 85 18 162/67 93 93 % None (Room air)   12/26/22 0659 98 °F (36 7 °C) 81 18 170/77 -- 96 % None (Room air)       Pertinent Labs/Diagnostic Results:           Results from last 7 days   Lab Units 12/27/22  0455 12/24/22  0459 12/23/22  0656 12/22/22  0539   WBC Thousand/uL 8 29 4 97 4 57 3 85*   HEMOGLOBIN g/dL 7 9* 7 9* 7 4* 7 3*   HEMATOCRIT % 27 0* 26 1* 24 9* 24 3*   PLATELETS Thousands/uL 370 227 200 214   NEUTROS ABS Thousands/µL 4 95 3 28 3 24 2 62         Results from last 7 days   Lab Units 12/24/22  0459 12/23/22  0656 12/22/22  0539   SODIUM mmol/L 141 141 139   POTASSIUM mmol/L 3 6 3 4* 3 6   CHLORIDE mmol/L 104 104 103   CO2 mmol/L 29 27 28   ANION GAP mmol/L 8 10 8   BUN mg/dL 8 8 10   CREATININE mg/dL 0 65 0 65 0 65   EGFR ml/min/1 73sq m 96 96 96   CALCIUM mg/dL 8 1* 7 9* 7 6*   MAGNESIUM mg/dL  --   --  1 4*   PHOSPHORUS mg/dL  --   --  2 8     Results from last 7 days   Lab Units 12/22/22  0539   AST U/L 27   ALT U/L 13   ALK PHOS U/L 167*   TOTAL PROTEIN g/dL 5 7*   ALBUMIN g/dL 1 4*   TOTAL BILIRUBIN mg/dL 0 27     Results from last 7 days   Lab Units 12/28/22  1110 12/28/22  0732 12/27/22 2107 12/27/22  1630 12/27/22  1125 12/27/22  0740 12/26/22  2058 12/26/22  1621 12/26/22  1127 12/26/22  0839 12/25/22  2109 12/25/22  1613   POC GLUCOSE mg/dl 114 98 112 175* 127 107 132 154* 84 101 138 114     Results from last 7 days   Lab Units 12/24/22  0459 12/23/22  0656 12/22/22  0539   GLUCOSE RANDOM mg/dL 94 108 109         Results from last 7 days   Lab Units 12/28/22  0509 12/27/22  0455 12/25/22  0951   PROTIME seconds 28 4* 33 3* 24 8*   INR  2 68* 3 30* 2 25*         Medications:   Scheduled Medications:  amLODIPine, 10 mg, Oral, Daily  atorvastatin, 40 mg, Oral, Daily With Dinner  buPROPion, 150 mg, Oral, Daily  clopidogrel, 75 mg, Oral, Daily  collagenase, , Topical, Daily  doxazosin, 4 mg, Oral, HS  doxycycline hyclate, 100 mg, Oral, Q12H [address]  ferrous sulfate, 325 mg, Oral, Daily With Lunch  gabapentin, 400 mg, Oral, TID  insulin glargine, 10 Units, Subcutaneous, HS  insulin lispro, 1-6 Units, Subcutaneous, TID AC  lisinopril, 40 mg, Oral, Daily  methocarbamol, 1,000 mg, Oral, Q8H MICHELLE  metoprolol succinate, 100 mg, Oral, Daily  nystatin, , Topical, BID  oxyCODONE, 10 mg, Oral, Q12H MICHELLE  pantoprazole, 40 mg, Oral, Early Morning  polyethylene glycol, 17 g, Oral, BID  potassium chloride, 20 mEq, Oral, Daily  QUEtiapine, 25 mg, Oral, HS  senna, 1 tablet, Oral, BID  sertraline, 175 mg, Oral, Daily  warfarin, 3 mg, Oral, Daily (warfarin)      HYDROmorphone (DILAUDID) injection 0 5 mg  Dose: 0 5 mg  Freq: Once Route: IV  Start: 12/28/22 1330 End: 12/28/22 1327      Continuous IV Infusions: None  PRN Meds:  acetaminophen, 650 mg, Oral, Q6H PRN  ALPRAZolam, 0 25 mg, Oral, Daily PRN  aluminum-magnesium hydroxide-simethicone, 30 mL, Oral, Q4H PRN  labetalol, 10 mg, Intravenous, Q6H PRN  metoclopramide, 10 mg, Intravenous, Q6H PRN  naloxone, 0 04 mg, Intravenous, Q1MIN PRN  ondansetron, 4 mg, Intravenous, Q6H PRN  oxyCODONE, 2 5 mg, Oral, Q4H PRN   Or  oxyCODONE, 5 mg, Oral, Q4H PRN   Or  oxyCODONE, 7 5 mg, Oral, Q4H PRN        Discharge Plan: [facility]        Network Utilization Review Department  ATTENTION: Please call with any questions or concerns to [phone] and carefully listen to the prompts so that you are directed to the right person  All voicemails are confidential   [place] all requests for admission clinical reviews, approved or denied determinations and any other requests to dedicated fax number below belonging to the campus where the patient is receiving treatment   List of dedicated fax numbers for the Facilities:  [address] [place] [address] DENIALS (Administrative/Medical Necessity) [phone]   [address] (Maternity/NICU/Pediatrics) Milena Kendall [address] [place] [address] [place] [address] [phone] [address] [place] [address] [address] [place] [address] Tona Vigil [address] [facility] [address] [phone]

## 2022-12-28 NOTE — ASSESSMENT & PLAN NOTE
Lab Results   Component Value Date    HGBA1C 9 8 (H) 10/25/2022     Recent Labs     12/27/22  1125 12/27/22  1630 12/27/22  2107 12/28/22  0732   POCGLU 127 175* 112 98     · Significantly reduced regimen from home due to poor oral intake  · Goal sugar < 180  · Continue Lantus to 10 units at night  · Continue sliding scale insulin

## 2022-12-28 NOTE — ASSESSMENT & PLAN NOTE
· Continue amlodipine 10 mg daily, doxazosin 4 mg at bedtime, metoprolol 100 mg daily, lisinopril 40 mg daily with hold parameters

## 2022-12-28 NOTE — PROGRESS NOTES
[facility]  [facility] 1960, 65 y o  female MRN: [id number]  Unit/Bed#: E4 -01 Encounter: [id number]  Primary Care Provider: ALEX Ortega   Date and time admitted to hospital: 10/21/2022  7:58 PM    * PAD (peripheral artery disease) ([facility] [address] )  Assessment & Plan  · She was initially admitted for nonhealing wound of the left lower extremity, requiring multiple endovascular interventions and ultimately underwent left AKA on 11/23/2022 and right AKA on 12/1/22  · S/p right stump debridement and vac placement 12/16/22  · For additional right stump washout and VAC exchange- 12/19/2022  per surgery  · Continue pain control  · Eventual short-term rehab placement when medically stable  · Management per primary service    Febrile  Assessment & Plan  Intermittent fever; but currently afebrile x > 48 hours  UA positive, but likely contaminant rather than an actual urinary tract infection  Possibly post op fever  · Continue to have fever; wound cultures from recent wound washout with enterobacteria  · ID recommending 10 days of doxycycline twice daily; abx through 1/2    HIT (heparin-induced thrombocytopenia)  Assessment & Plan  · Positive heparin antibody on 11/11/2022  · Completed 5 days of overlap with argatroban and Coumadin   · Continue coumadin for minimum of 4 weeks  · Goal INR 2-3    Diabetic ulcer of heel associated with diabetes mellitus due to underlying condition [facility])  Assessment & Plan  S/p left aka 11/23/2022  · Post-op wound management per vascular surgery  · Await next step in management from vascular surgery, underwent surgical washout of right stump debridement of left stump on 12/19  · Pain appears well controlled on OxyContin 10 twice daily and gabapentin 400mg tid    · As needed pain medications taken sparingly    Acute on chronic anemia  Assessment & Plan  · She has had multiple  blood transfusions during this admission  · Last blood transfusion was on  12/15/2022 prior to her surgery  · Monitor  · Hemoglobin continues to decline, FOBT negative  · Review of recent iron panel appears to show picture of anemia of chronic disease  · Additionally, iron noted to be low, will initiate iron supplementation  · appears to be stable at 7 9, monitor    Type 2 diabetes mellitus with hyperglycemia, with long-term current use of insulin [facility])  Assessment & Plan  Lab Results   Component Value Date    HGBA1C 9 8 (H) 10/25/2022     Recent Labs     12/27/22  1125 12/27/22  1630 12/27/22  2107 12/28/22  0732   POCGLU 127 175* 112 98     · Significantly reduced regimen from home due to poor oral intake  · Goal sugar < 180  · Continue Lantus to 10 units at night  · Continue sliding scale insulin    Stroke [facility])  Assessment & Plan  · Noted to have change in mental status on 11/13/2022  · CT confirmed moderate right parietal lobe stroke, likely embolic in the setting of HIT with thrombosis  · She was treated with argatroban/coumadin bridge  · INR 2 68 will give warfarin 3 mg today, monitor INR closely  · Goal INR 2-3    Benign essential hypertension  Assessment & Plan  · Continue amlodipine 10 mg daily, doxazosin 4 mg at bedtime, metoprolol 100 mg daily, lisinopril 40 mg daily with hold parameters    Depression, recurrent ([facility] [address] )  Assessment & Plan  Evaluated by Psychiatry x 2 on this admission  ·  continue Zoloft 175 mg daily, Wellbutrin 150 mg daily, Seroquel 25 mg at bedtime  VTE Pharmacologic Prophylaxis:   Pharmacologic: warfarin    Patient Centered Rounds: I have performed bedside rounds with nursing staff today  Time Spent for Care: 20 minutes  More than 50% of total time spent on counseling and coordination of care as described above      Current Length of Stay: 67 day(s)    Current Patient Status: Inpatient     Code Status: Level 1 - Full Code      Subjective:   Patient seen and examined at bedside, comfortable, eating breakfast, denies any complaints    Objective:     Vitals:   Temp (24hrs), Av 3 °F (36 8 °C), Min:98 °F (36 7 °C), Max:98 6 °F (37 °C)    Temp:  [98 °F (36 7 °C)-98 6 °F (37 °C)] 98 °F (36 7 °C)  HR:  [77-86] 77  Resp:  [18] 18  BP: (152-155)/(67-81) 155/81  SpO2:  [95 %-96 %] 96 %  Body mass index is 26 26 kg/m²  Input and Output Summary (last 24 hours): Intake/Output Summary (Last 24 hours) at 2022 1548  Last data filed at 2022 0935  Gross per 24 hour   Intake --   Output 950 ml   Net -950 ml       Physical Exam:    Constitutional: Patient is oriented to person, place and time, no acute distress  HEENT:  Normocephalic, atraumatic  Cardiovascular: Normal S1S2, RRR, No murmurs/rubs/gallops appreciated  Pulmonary:  Bilateral air entry, No rhonchi/rales/wheezing appreciated  Abdominal: Soft, Bowel sounds present, Non-tender, Non-distended  Extremities: Bilateral AKA, right AKA with wound VAC in place  Neurological: Awake, alert  Skin:  Warm, dry    Additional Data:     Labs:    Results from last 7 days   Lab Units 22  0455   WBC Thousand/uL 8 29   HEMOGLOBIN g/dL 7 9*   HEMATOCRIT % 27 0*   PLATELETS Thousands/uL 370   NEUTROS PCT % 59   LYMPHS PCT % 23   MONOS PCT % 9   EOS PCT % 6     Results from last 7 days   Lab Units 22  0459 22  0656 22  0539   POTASSIUM mmol/L 3 6   < > 3 6   CHLORIDE mmol/L 104   < > 103   CO2 mmol/L 29   < > 28   BUN mg/dL 8   < > 10   CREATININE mg/dL 0 65   < > 0 65   CALCIUM mg/dL 8 1*   < > 7 6*   ALK PHOS U/L  --   --  167*   ALT U/L  --   --  13   AST U/L  --   --  27    < > = values in this interval not displayed  Results from last 7 days   Lab Units 22  0509   INR  2 68*        I Have Reviewed All Lab Data Listed Above  Invasive Devices     Peripheral Intravenous Line  Duration           Peripheral IV 22 Dorsal (posterior); Left Forearm <1 day          Drain  Duration           External Urinary Catheter 5 days                     Recent Cultures (last 7 days):           Last 24 Hours Medication List:   Current Facility-Administered Medications   Medication Dose Route Frequency Provider Last Rate   • acetaminophen  650 mg Oral Q6H PRN Lilian Quintanilla, DO     • ALPRAZolam  0 25 mg Oral Daily PRN Lilian Quintanilla, DO     • aluminum-magnesium hydroxide-simethicone  30 mL Oral Q4H PRN Lilian Quintanilla, DO     • amLODIPine  10 mg Oral Daily Lilian Quintanilla, DO     • atorvastatin  40 mg Oral Daily With [address], DO     • buPROPion  150 mg Oral Daily Lilian Quintanilla, DO     • clopidogrel  75 mg Oral Daily Lilian Quintanilla, DO     • collagenase   Topical Daily Lilian Quintanilla, DO     • doxazosin  4 mg Oral HS Lilian Quintanilla, DO     • doxycycline hyclate  100 mg Oral Q12H Sagrario Paul MD     • ferrous sulfate  325 mg Oral Daily With Lunch Calogero Reba, DO     • gabapentin  400 mg Oral TID Lilian Quintanilla, DO     • insulin glargine  10 Units Subcutaneous HS Lilian Quintanilla, DO     • insulin lispro  1-6 Units Subcutaneous TID AC Lilian Quintanilla, DO     • labetalol  10 mg Intravenous Q6H PRN Lilian Quintanilla, DO     • lisinopril  40 mg Oral Daily Lilian Quintanilla, DO     • methocarbamol  1,000 mg Oral [facility] Lilian Quintanilla, DO     • metoclopramide  10 mg Intravenous Q6H PRN Lilian Quintanilla, DO     • metoprolol succinate  100 mg Oral Daily Maria M Lay MD     • naloxone  0 04 mg Intravenous Q1MIN PRN Lilian Quintanilla, DO     • nystatin   Topical BID Lilian Quintanilla, DO     • ondansetron  4 mg Intravenous Q6H PRN Lilian Quintanilla, DO     • oxyCODONE  10 mg Oral Q12H [facility] Lilian Quintanilla, DO     • oxyCODONE  2 5 mg Oral Q4H PRN Lilian Quintanilla, DO      Or   • oxyCODONE  5 mg Oral Q4H PRN Lilian Quintanilla, DO      Or   • oxyCODONE  7 5 mg Oral Q4H PRN Lilian Quintanilla, DO     • pantoprazole  40 mg Oral Early Morning Lilian Quintanilla, DO     • polyethylene glycol  17 g Oral BID Kyle Turner, DO     • potassium chloride  20 mEq Oral Daily Willis Pretty MD     • QUEtiapine  25 mg Oral HS Marchneva Turner DO     • senna  1 tablet Oral BID Marchneva Turner DO     • sertraline  175 mg Oral Daily Kyle Turner DO     • warfarin  3 mg Oral Daily (warfarin) Raji Tom MD          Today, Patient Was Seen By: Raji Tom MD

## 2022-12-28 NOTE — CASE MANAGEMENT
Case Management Discharge Planning Note    Patient name Lauryn Fernandez  Location [address] [place] [address] [address]/E4 8850 [facility]-* MRN [id number]  : 1960 Date 2022       Current Admission Date: 10/21/2022  Current Admission Diagnosis:PAD (peripheral artery disease) [facility])   Patient Active Problem List    Diagnosis Date Noted   • Mild protein-calorie malnutrition ([address] ) 2022   • Diarrhea 2022   • CVA (cerebral vascular accident) ([address] ) 2022   • Febrile 2022   • HIT (heparin-induced thrombocytopenia) 2022   • Diabetic ulcer of heel associated with diabetes mellitus due to underlying condition ([address] ) 11/10/2022   • Acute on chronic anemia 10/30/2022   • Generalized edema due to fluid overload 10/27/2022   • PAD (peripheral artery disease) ([address] ) 10/25/2022   • Non healing left heel wound 10/22/2022   • Hypertensive urgency 10/22/2022   • Hypokalemia 10/22/2022   • Wound of lower extremity 10/21/2022   • Epigastric pain 2022   • Type 2 diabetes mellitus with hyperglycemia, with long-term current use of insulin ([address] ) 10/18/2021   • Hyperparathyroidism ([address] ) 10/18/2021   • 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 ([address] ) 2021   • 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   • Moderate protein-calorie malnutrition ([address] ) 2020   • Acute colitis 2020   • Arthritis 2019   • Depression, recurrent ([address] ) 2018   • Class 3 severe obesity due to excess calories with serious comorbidity and body mass index (BMI) of 40 0 to 44 9 in adult [facility]) 2018   • Esophageal reflux 2018   • Uncontrolled type 2 diabetes with neuropathy 2018   • Diabetic peripheral neuropathy ([address] )    • Systolic murmur    • Stroke ([facility] [address] ) 2015   • Anxiety 2015   • Vitamin D deficiency 2015   • Benign essential hypertension 2012   • Familial combined hyperlipidemia 2012      LOS (days): 67  Geometric Mean LOS (GMLOS) (days):   Days to GMLOS:     OBJECTIVE:  Risk of Unplanned Readmission Score: 33 8         Current admission status: Inpatient   Preferred Pharmacy:   [id number] [address]  [address]  [address] [address]  Phone: [phone] Fax: [phone]    Primary Care Provider: ALEX Santos    Primary Insurance: BLUE CROSS  Secondary Insurance:     DISCHARGE DETAILS:    Accepting Facility Name, [facility] : [address] [facility] [address] [facility], [address]  Receiving Facility/Agency Phone Number: Phone: [phone]  Facility/Agency Fax Number: Fax: [phone] remains for patient to go to [facility]  Auth has been started by facility- awaiting determination  CM will continue to follow patient  UPDATE- Per [facility] liaison, Authorization was not submitted due to facility being non-par with insurance  [facility] had an option to request and OON authorization however patients Primary BC insurance plan takes 72hrs- 1 week for determination of OON auth  Patients Primary BC plan terms 12/31/22, so if OON auth started the plan would be inactive by determination date  Daughter told [facility] that they can bill MA but denial needed from Primary [facility] plan  MA plan does not go into effect until 1/15/23  CM enlarged distance for STR referrals in [place]  CM will continue to follow patient

## 2022-12-28 NOTE — PROGRESS NOTES
Progress Note - Infectious Disease   Chester Pedroza 65 y o  female MRN: [id number]  Unit/Bed#: E4 -01 Encounter: [id number]    Impression/Plan:  1  Left AKA stump site abscess  Patient is s/p OR on 12/19/2022 with abscess cavity found  Purulent drainage sent for culture, growing Enterobacter cloacae  Discussed with vascular team, no current concern for bone infection but rather soft tissue only   Patient has been transitioned to Doxycycline which she is tolerating without difficulty  I will continue the Doxycycline for now in anticipation of patient completing 10 days of antibiotic treatment   -continue PO Doxycycline through 1/2/2023 for 10 days total antibiotic treatment  -monitor CBCD and BMP  -monitor vitals  -serial stump exams  -local wound care/VAC care per vascular surgery  -continue follow up with vascular surgery     2  Recent fever  Occurring on 12/19/2022, though appears to correlate post op from her right AKA wound VAC change and left AKA stump site I&D  Suspect reactive to surgery and possibly to the infection of left AKA site  Fever curve has trended down and WBC count remains normal this morning    -antibiotic as above  -monitor CBCD  -monitor vitals     3  Severe PAD  Complicated by bilateral atheroembolic events and limb ischemia  Patient is s/p bilateral AKA      4  Right Hemispheric CVA  New acute stroke noted on head CT on 11/13/2022   -supportive care      5  Type 2 diabetes mellitus with long term insulin use  Patient's last HbA1c was 9 8% on 10/25/2022  Elevated blood glucose is risk factor for infection  Recommend tight glycemic control  SLIM is on board for medical management   -blood glucose management per SLIM    Patient remains stable for discharge from ID standpoint  Above plan was discussed in detail with patient at the bedside  Above plan was discussed in detail with vascular surgery      Antibiotics:  Doxycycline 6  Antibiotics 7    Subjective:  Patient reports she's doing alright this morning  Has pain in both of her stumps but tells me her pain medication does help a lot  She has no concerns with her VAC, is hoping they give her extra pain medication before the change as she says the changes hurt a lot  She has no fever, chills, sweats, shakes; no nausea, vomiting, abdominal pain, diarrhea, or dysuria; no cough, shortness of breath, or chest pain  No new symptoms  Objective:  Vitals:  Temp:  [97 7 °F (36 5 °C)-98 6 °F (37 °C)] 98 6 °F (37 °C)  HR:  [81-86] 86  Resp:  [18] 18  BP: (135-167)/(67-74) 152/67  SpO2:  [94 %-95 %] 95 %  Temp (24hrs), Av 1 °F (36 7 °C), Min:97 7 °F (36 5 °C), Max:98 6 °F (37 °C)  Current: Temperature: 98 6 °F (37 °C)    Physical Exam:   General Appearance:  Alert, interactive, nontoxic, no acute distress  She appears comfortable sitting up in bed  Throat: Oropharynx moist without lesions  Lungs:   Clear to auscultation bilaterally; no wheezes, rhonchi or rales; respirations unlabored on room air  Heart:  RRR; no murmur, rub or gallop  Abdomen:   Soft, non-tender, non-distended, positive bowel sounds  Extremities: L BKA with dressing over stump site, some staples visible and still intact  R stump with VAC dressing intact, no leakage, no spreading erythema from site  Skin: No new rashes or lesions noted on exposed skin       Labs, Imaging, & Other studies:   All pertinent labs and imaging studies were personally reviewed  Results from last 7 days   Lab Units 22  0455 22  0459 22  0656   WBC Thousand/uL 8 29 4 97 4 57   HEMOGLOBIN g/dL 7 9* 7 9* 7 4*   PLATELETS Thousands/uL 370 227 200     Results from last 7 days   Lab Units 22  0459 22  0656 22  0539   POTASSIUM mmol/L 3 6   < > 3 6   CHLORIDE mmol/L 104   < > 103   CO2 mmol/L 29   < > 28   BUN mg/dL 8   < > 10   CREATININE mg/dL 0 65   < > 0 65   EGFR ml/min/1 73sq m 96   < > 96   CALCIUM mg/dL 8 1*   < > 7 6*   AST U/L  --   --  27   ALT U/L  -- --  13   ALK PHOS U/L  --   --  167*    < > = values in this interval not displayed

## 2022-12-28 NOTE — ASSESSMENT & PLAN NOTE
Intermittent fever; but currently afebrile x > 48 hours  UA positive, but likely contaminant rather than an actual urinary tract infection  Possibly post op fever     · Continue to have fever; wound cultures from recent wound washout with enterobacteria  · ID recommending 10 days of doxycycline twice daily; abx through 1/2

## 2022-12-28 NOTE — ASSESSMENT & PLAN NOTE
· Positive heparin antibody on 11/11/2022  · Completed 5 days of overlap with argatroban and Coumadin   · Continue coumadin for minimum of 4 weeks  · Goal INR 2-3

## 2022-12-28 NOTE — PROGRESS NOTES
[facility]  Progress Note - Varinder Sea 1960, 65 y o  female MRN: [id number]  Unit/Bed#: E4 -01 Encounter: [id number]  Primary Care Provider: ALEX Posada   Date and time admitted to hospital: 10/21/2022  7:58 PM    * PAD (peripheral artery disease) [facility])  Assessment & Plan  65year old female former smoker w/HTN, HLD, uncontrolled type II DM (A1c 9 8), hx CVA, presenting with nonhealing extensive L heel ulceration and R hallux and 5th digit ulceration  Vascular surgery consulted for input  S/p multiple b/l endovascular interventions  JUSTIN  R hemispheric CVA    S/p L AKA 11/23/22 [facility])  S/p R AKA, wound debridement 12/1/22 [facility])  S/p R AKA wound/stump washout and VAC placement 12/16/22 [facility])  S/p R AKA wound/stump VAC change, L AKA stump washout/packing 12/19/22 ([facility])      Wound Cx: enterobacter cloacae     Recommendations:  - Bilateral tissue loss in the setting of uncontrolled type II DM and NYDIA on admission, now s/p multiple angiograms w/intervention    - Endovascular interventions, c/b atheroembolic event to the RLE and JUSTIN with R hemispheric CVA  - Now s/p L AKA on 11/23/22 and R AKA 12/1/22 w/ proximal thigh wound debridements    - R AKA stump with incisional dehiscence and thigh wounds requiring debridement  Taken to the OR on 12/16 for washout and VAC placement with return to the OR 12/19 for VAC change  - L AKA stump incision dehiscence now closed  Continue local wound care  - Appreciate ID for assistance with abx- L stump cultures growing out enterobacter cloacae  Patient stable on current plan per ID- doxycycline through 1/2/23  - Right AKA wound VAC changed 12/28/22  Continue wound VAC therapy  Continuous suction 125 mmHg   change MWF-next change on Friday 12/30/22  - Incentive spirometer   - Medical management with statin, Plavix  - Continue coumadin  - Medical management and glucose control per SLIM  - Continue oral pain regimen, adjusted to long acting which appears to be controlling patient's pain well   - Appreciate psych input and management - addition of wellbutrin and adjustment of zoloft   - Disposition planning for rehab: will follow up with CM   - d/w Dr Kayce Hernandez      Subjective:  Resting comfortably in bed  Premedicated w/ Dilaudid 0 5 mg IV prior to VAC change  Patient tolerated well  Vitals:  /81 (BP Location: Left arm)   Pulse 77   Temp 98 °F (36 7 °C) (Temporal)   Resp 18   Ht 5' 4" (1 626 m)   Wt 69 4 kg (153 lb)   SpO2 96%   BMI 26 26 kg/m²     I/Os:  I/O last 3 completed shifts:  In: -   Out: 900 [Urine:900]  I/O this shift:  In: -   Out: 950 [Urine:950]    Lab Results and Cultures:   Lab Results   Component Value Date    WBC 8 29 12/27/2022    HGB 7 9 (L) 12/27/2022    HCT 27 0 (L) 12/27/2022    MCV 87 12/27/2022     12/27/2022     Lab Results   Component Value Date    CALCIUM 8 1 (L) 12/24/2022    K 3 6 12/24/2022    CO2 29 12/24/2022     12/24/2022    BUN 8 12/24/2022    CREATININE 0 65 12/24/2022     Lab Results   Component Value Date    INR 2 68 (H) 12/28/2022    INR 3 30 (H) 12/27/2022    INR 2 25 (H) 12/25/2022    PROTIME 28 4 (H) 12/28/2022    PROTIME 33 3 (H) 12/27/2022    PROTIME 24 8 (H) 12/25/2022        Blood Culture:   Lab Results   Component Value Date    BLOODCX No Growth After 5 Days   11/13/2022    BLOODCX Staphylococcus coagulase negative (A) 11/13/2022   ,   Urinalysis:   Lab Results   Component Value Date    COLORU Yellow 11/12/2022    CLARITYU Slightly Cloudy 11/12/2022    SPECGRAV 1 025 11/12/2022    PHUR 5 5 11/12/2022    PHUR 6 5 02/23/2018    LEUKOCYTESUR Trace (A) 11/12/2022    NITRITE Positive (A) 11/12/2022    GLUCOSEU Negative 11/12/2022    KETONESU Negative 11/12/2022    BILIRUBINUR Negative 11/12/2022    BLOODU Large (A) 11/12/2022   ,   Urine Culture:   Lab Results   Component Value Date    URINECX >100,000 cfu/ml Enterobacter cloacae complex (A) 11/12/2022    URINECX 70,000-79,000 cfu/ml Renée hernandez (A) 11/12/2022   ,   Wound Culure: No results found for: WOUNDCULT    Medications:  Current Facility-Administered Medications   Medication Dose Route Frequency   • acetaminophen (TYLENOL) tablet 650 mg  650 mg Oral Q6H PRN   • ALPRAZolam (XANAX) tablet 0 25 mg  0 25 mg Oral Daily PRN   • aluminum-magnesium hydroxide-simethicone (MYLANTA) oral suspension 30 mL  30 mL Oral Q4H PRN   • amLODIPine (NORVASC) tablet 10 mg  10 mg Oral Daily   • atorvastatin (LIPITOR) tablet 40 mg  40 mg Oral Daily With Dinner   • buPROPion (WELLBUTRIN XL) 24 hr tablet 150 mg  150 mg Oral Daily   • clopidogrel (PLAVIX) tablet 75 mg  75 mg Oral Daily   • collagenase (SANTYL) ointment   Topical Daily   • doxazosin (CARDURA) tablet 4 mg  4 mg Oral HS   • doxycycline hyclate (VIBRAMYCIN) capsule 100 mg  100 mg Oral Q12H [address]   • ferrous sulfate tablet 325 mg  325 mg Oral Daily With Lunch   • gabapentin (NEURONTIN) capsule 400 mg  400 mg Oral TID   • insulin glargine (LANTUS) subcutaneous injection 10 Units 0 1 mL  10 Units Subcutaneous HS   • insulin lispro (HumaLOG) 100 units/mL subcutaneous injection 1-6 Units  1-6 Units Subcutaneous TID AC   • labetalol (NORMODYNE) injection 10 mg  10 mg Intravenous Q6H PRN   • lisinopril (ZESTRIL) tablet 40 mg  40 mg Oral Daily   • methocarbamol (ROBAXIN) tablet 1,000 mg  1,000 mg Oral Q8H [address]   • metoclopramide (REGLAN) injection 10 mg  10 mg Intravenous Q6H PRN   • metoprolol succinate (TOPROL-XL) 24 hr tablet 100 mg  100 mg Oral Daily   • naloxone (NARCAN) 0 04 mg/mL syringe 0 04 mg  0 04 mg Intravenous Q1MIN PRN   • nystatin (MYCOSTATIN) powder   Topical BID   • ondansetron (ZOFRAN) injection 4 mg  4 mg Intravenous Q6H PRN   • oxyCODONE (OxyCONTIN) 12 hr tablet 10 mg  10 mg Oral Q12H MICHELLE   • oxyCODONE (ROXICODONE) IR tablet 2 5 mg  2 5 mg Oral Q4H PRN    Or   • oxyCODONE (ROXICODONE) IR tablet 5 mg  5 mg Oral Q4H PRN    Or   • oxyCODONE (ROXICODONE) IR tablet 7 5 mg  7 5 mg Oral Q4H PRN   • pantoprazole (PROTONIX) EC tablet 40 mg  40 mg Oral Early Morning   • polyethylene glycol (MIRALAX) packet 17 g  17 g Oral BID   • potassium chloride (K-DUR,KLOR-CON) CR tablet 20 mEq  20 mEq Oral Daily   • QUEtiapine (SEROquel) tablet 25 mg  25 mg Oral HS   • senna (SENOKOT) tablet 8 6 mg  1 tablet Oral BID   • sertraline (ZOLOFT) tablet 175 mg  175 mg Oral Daily   • warfarin (COUMADIN) tablet 3 mg  3 mg Oral Daily (warfarin)       Imaging:  As above     Wound/Incision:    1  L AKA-CDI with staples  No dehiscence, drainage or bleeding  Superficial dehiscence now closed  Painted with Betadine, dry gauze dressing placed      2  R AKA    Bedside VAC change today 12/28/22:          Lateral wound      Sponges removed:  4 black sponges removed    Clinical picture above  Applied Santyl to small area of slough  4 black sponges total placed (3 to wound bed, 1 bridge)    VAC settings 125 mmHg, continuous suction    Physical Exam:    General appearance: alert and oriented, in no acute distress  Neurologic: Grossly normal  Lungs: clear to auscultation bilaterally  Heart: regular rate and rhythm, S1, S2 normal, no murmur, click, rub or gallop  Abdomen: soft, non-tender; bowel sounds normal; no masses,  no organomegaly  Extremities: Bilateral AKA    See wound description above      Pulse exam:  Radial: Right: 2+ Left[de-identified] 2+      ALEX Alaniz  12/28/2022  [facility], [phone]

## 2022-12-29 LAB
GLUCOSE SERPL-MCNC: 109 MG/DL (ref 65–140)
GLUCOSE SERPL-MCNC: 139 MG/DL (ref 65–140)
GLUCOSE SERPL-MCNC: 161 MG/DL (ref 65–140)
GLUCOSE SERPL-MCNC: 165 MG/DL (ref 65–140)
INR PPP: 2.71 (ref 0.84–1.19)
PROTHROMBIN TIME: 28.6 SECONDS (ref 11.6–14.5)

## 2022-12-29 PROCEDURE — 82948 REAGENT STRIP/BLOOD GLUCOSE: CPT

## 2022-12-29 PROCEDURE — 99024 POSTOP FOLLOW-UP VISIT: CPT | Performed by: SURGERY

## 2022-12-29 PROCEDURE — 85610 PROTHROMBIN TIME: CPT | Performed by: INTERNAL MEDICINE

## 2022-12-29 PROCEDURE — 99232 SBSQ HOSP IP/OBS MODERATE 35: CPT | Performed by: INTERNAL MEDICINE

## 2022-12-29 RX ADMIN — PANTOPRAZOLE SODIUM 40 MG: 40 TABLET, DELAYED RELEASE ORAL at 05:11

## 2022-12-29 RX ADMIN — WARFARIN SODIUM 3 MG: 3 TABLET ORAL at 18:25

## 2022-12-29 RX ADMIN — METHOCARBAMOL 1000 MG: 500 TABLET ORAL at 15:08

## 2022-12-29 RX ADMIN — OXYCODONE HYDROCHLORIDE 10 MG: 10 TABLET, FILM COATED, EXTENDED RELEASE ORAL at 10:21

## 2022-12-29 RX ADMIN — POTASSIUM CHLORIDE 20 MEQ: 1500 TABLET, EXTENDED RELEASE ORAL at 10:21

## 2022-12-29 RX ADMIN — DOXYCYCLINE 100 MG: 100 CAPSULE ORAL at 10:21

## 2022-12-29 RX ADMIN — METHOCARBAMOL 1000 MG: 500 TABLET ORAL at 05:11

## 2022-12-29 RX ADMIN — BUPROPION HYDROCHLORIDE 150 MG: 150 TABLET, FILM COATED, EXTENDED RELEASE ORAL at 10:21

## 2022-12-29 RX ADMIN — AMLODIPINE BESYLATE 10 MG: 10 TABLET ORAL at 10:21

## 2022-12-29 RX ADMIN — CLOPIDOGREL BISULFATE 75 MG: 75 TABLET ORAL at 10:21

## 2022-12-29 RX ADMIN — OXYCODONE HYDROCHLORIDE 10 MG: 10 TABLET, FILM COATED, EXTENDED RELEASE ORAL at 21:21

## 2022-12-29 RX ADMIN — GABAPENTIN 400 MG: 400 CAPSULE ORAL at 10:21

## 2022-12-29 RX ADMIN — GABAPENTIN 400 MG: 400 CAPSULE ORAL at 18:25

## 2022-12-29 RX ADMIN — METOPROLOL SUCCINATE 100 MG: 100 TABLET, FILM COATED, EXTENDED RELEASE ORAL at 10:21

## 2022-12-29 RX ADMIN — DOXAZOSIN 4 MG: 4 TABLET ORAL at 21:21

## 2022-12-29 RX ADMIN — LISINOPRIL 40 MG: 20 TABLET ORAL at 10:21

## 2022-12-29 RX ADMIN — FERROUS SULFATE TAB 325 MG (65 MG ELEMENTAL FE) 325 MG: 325 (65 FE) TAB at 12:29

## 2022-12-29 RX ADMIN — ATORVASTATIN CALCIUM 40 MG: 40 TABLET, FILM COATED ORAL at 18:25

## 2022-12-29 RX ADMIN — INSULIN LISPRO 1 UNITS: 100 INJECTION, SOLUTION INTRAVENOUS; SUBCUTANEOUS at 12:29

## 2022-12-29 RX ADMIN — SERTRALINE HYDROCHLORIDE 175 MG: 100 TABLET ORAL at 10:21

## 2022-12-29 RX ADMIN — DOXYCYCLINE 100 MG: 100 CAPSULE ORAL at 21:21

## 2022-12-29 RX ADMIN — METHOCARBAMOL 1000 MG: 500 TABLET ORAL at 21:21

## 2022-12-29 RX ADMIN — INSULIN GLARGINE 10 UNITS: 100 INJECTION, SOLUTION SUBCUTANEOUS at 21:21

## 2022-12-29 RX ADMIN — QUETIAPINE FUMARATE 25 MG: 25 TABLET ORAL at 21:21

## 2022-12-29 NOTE — ASSESSMENT & PLAN NOTE
Lab Results   Component Value Date    HGBA1C 9 8 (H) 10/25/2022     Recent Labs     12/28/22  1110 12/28/22  1555 12/28/22  2127 12/29/22  0756   POCGLU 114 159* 142* 109     · Significantly reduced regimen from home due to poor oral intake  · Goal sugar < 180  · Continue Lantus to 10 units at night  · Continue sliding scale insulin

## 2022-12-29 NOTE — PROGRESS NOTES
[facility]  Progress Note - [address] [facility] [address] 1960, 65 y o  female MRN: [id number]  Unit/Bed#: E4 -01 Encounter: [id number]  Primary Care Provider: ALEX Mora   Date and time admitted to hospital: 10/21/2022  7:58 PM    * PAD (peripheral artery disease) ([facility] [address] )  Assessment & Plan  · She was initially admitted for nonhealing wound of the left lower extremity, requiring multiple endovascular interventions and ultimately underwent left AKA on 11/23/2022 and right AKA on 12/1/22  · S/p right stump debridement and vac placement 12/16/22  · For additional right stump washout and VAC exchange- 12/19/2022  per surgery  · Continue pain control  · Eventual short-term rehab placement when medically stable  · Management per primary service    Febrile  Assessment & Plan  Intermittent fever; but currently afebrile x > 48 hours  UA positive, but likely contaminant rather than an actual urinary tract infection  Possibly post op fever  · Continue to have fever; wound cultures from recent wound washout with enterobacteria  · ID recommending 10 days of doxycycline twice daily; abx through 1/2    HIT (heparin-induced thrombocytopenia)  Assessment & Plan  · Positive heparin antibody on 11/11/2022  · Completed 5 days of overlap with argatroban and Coumadin   · Continue coumadin for minimum of 4 weeks  · Goal INR 2-3    Diabetic ulcer of heel associated with diabetes mellitus due to underlying condition [facility])  Assessment & Plan  S/p left aka 11/23/2022  · Post-op wound management per vascular surgery  · Await next step in management from vascular surgery, underwent surgical washout of right stump debridement of left stump on 12/19  · Pain appears well controlled on OxyContin 10 twice daily and gabapentin 400mg tid    · As needed pain medications taken sparingly    Acute on chronic anemia  Assessment & Plan  · She has had multiple  blood transfusions during this admission  · Last blood transfusion was on  12/15/2022 prior to her surgery  · Monitor  · Hemoglobin continues to decline, FOBT negative  · Review of recent iron panel appears to show picture of anemia of chronic disease  · Additionally, iron noted to be low, will initiate iron supplementation  · appears to be stable at 7 9, monitor    Type 2 diabetes mellitus with hyperglycemia, with long-term current use of insulin [facility])  Assessment & Plan  Lab Results   Component Value Date    HGBA1C 9 8 (H) 10/25/2022     Recent Labs     12/28/22  1110 12/28/22  1555 12/28/22  2127 12/29/22  0756   POCGLU 114 159* 142* 109     · Significantly reduced regimen from home due to poor oral intake  · Goal sugar < 180  · Continue Lantus to 10 units at night  · Continue sliding scale insulin    Stroke [facility])  Assessment & Plan  · Noted to have change in mental status on 11/13/2022  · CT confirmed moderate right parietal lobe stroke, likely embolic in the setting of HIT with thrombosis  · She was treated with argatroban/coumadin bridge  · INR 2 71 will give warfarin 3 mg today, monitor INR closely  · Goal INR 2-3    Benign essential hypertension  Assessment & Plan  · Continue amlodipine 10 mg daily, doxazosin 4 mg at bedtime, metoprolol 100 mg daily, lisinopril 40 mg daily with hold parameters    Depression, recurrent ([facility] [address] )  Assessment & Plan  Evaluated by Psychiatry x 2 on this admission  ·  continue Zoloft 175 mg daily, Wellbutrin 150 mg daily, Seroquel 25 mg at bedtime  VTE Pharmacologic Prophylaxis:   Pharmacologic: warfarin    Patient Centered Rounds: I have performed bedside rounds with nursing staff today  Education and Discussions with Family / Patient: patient    Time Spent for Care: 20 minutes  More than 50% of total time spent on counseling and coordination of care as described above      Current Length of Stay: 68 day(s)    Current Patient Status: Inpatient     Code Status: Level 1 - Full Code      Subjective:   Patient seen and examined at bedside, comfortable, denies any complaints    Objective:     Vitals:   Temp (24hrs), Av °F (36 7 °C), Min:97 2 °F (36 2 °C), Max:98 7 °F (37 1 °C)    Temp:  [97 2 °F (36 2 °C)-98 7 °F (37 1 °C)] 98 7 °F (37 1 °C)  HR:  [77-91] 83  Resp:  [20] 20  BP: (156-172)/(68-76) 172/76  SpO2:  [91 %-94 %] 94 %  Body mass index is 26 26 kg/m²  Input and Output Summary (last 24 hours):     No intake or output data in the 24 hours ending 22 1707    Physical Exam:    Constitutional: Patient is oriented to person, place and time, no acute distress  HEENT:  Normocephalic, atraumatic  Cardiovascular: Normal S1S2, RRR, No murmurs/rubs/gallops appreciated  Pulmonary:  Bilateral air entry, No rhonchi/rales/wheezing appreciated  Abdominal: Soft, Bowel sounds present, Non-tender, Non-distended  Extremities:  No cyanosis, clubbing or edema  Neurological: bilateral AKA, right AKA with wound vac in place  Skin:  Warm, dry    Additional Data:     Labs:    Results from last 7 days   Lab Units 22  0455   WBC Thousand/uL 8 29   HEMOGLOBIN g/dL 7 9*   HEMATOCRIT % 27 0*   PLATELETS Thousands/uL 370   NEUTROS PCT % 59   LYMPHS PCT % 23   MONOS PCT % 9   EOS PCT % 6     Results from last 7 days   Lab Units 22  0459   POTASSIUM mmol/L 3 6   CHLORIDE mmol/L 104   CO2 mmol/L 29   BUN mg/dL 8   CREATININE mg/dL 0 65   CALCIUM mg/dL 8 1*     Results from last 7 days   Lab Units 22  0503   INR  2 71*        I Have Reviewed All Lab Data Listed Above  Invasive Devices     Peripheral Intravenous Line  Duration           Peripheral IV 22 Dorsal (posterior); Left Forearm 1 day          Drain  Duration           External Urinary Catheter 6 days                     Recent Cultures (last 7 days):           Last 24 Hours Medication List:   Current Facility-Administered Medications   Medication Dose Route Frequency Provider Last Rate   • acetaminophen  650 mg Oral Q6H PRN Carmen Landin DO     • ALPRAZolam  0 25 mg Oral Daily PRN Maddie Locket, DO     • aluminum-magnesium hydroxide-simethicone  30 mL Oral Q4H PRN Maddie Locket, DO     • amLODIPine  10 mg Oral Daily Maddie Locket, DO     • atorvastatin  40 mg Oral Daily With [address], DO     • buPROPion  150 mg Oral Daily Maddie Locket, DO     • clopidogrel  75 mg Oral Daily Maddie Locket, DO     • collagenase   Topical Daily Maddie Locket, DO     • doxazosin  4 mg Oral HS Maddie Locket, DO     • doxycycline hyclate  100 mg Oral Q12H Sahil Ward MD     • ferrous sulfate  325 mg Oral Daily With Lunch Calogero Reba, DO     • gabapentin  400 mg Oral TID Maddie Locket, DO     • insulin glargine  10 Units Subcutaneous HS Maddie Locket, DO     • insulin lispro  1-6 Units Subcutaneous TID AC Maddie Locket, DO     • labetalol  10 mg Intravenous Q6H PRN Maddie Locket, DO     • lisinopril  40 mg Oral Daily Maddie Locket, DO     • methocarbamol  1,000 mg Oral [facility] Maddie Locket, DO     • metoclopramide  10 mg Intravenous Q6H PRN Maddie Locket, DO     • metoprolol succinate  100 mg Oral Daily Merlinda Park, MD     • naloxone  0 04 mg Intravenous Q1MIN PRN Maddie Locket, DO     • nystatin   Topical BID Maddie Locket, DO     • ondansetron  4 mg Intravenous Q6H PRN Maddie Locket, DO     • oxyCODONE  10 mg Oral Q12H [address] Maddie Locket, DO     • oxyCODONE  2 5 mg Oral Q4H PRN Maddie Locket, DO      Or   • oxyCODONE  5 mg Oral Q4H PRN Maddie Locket, DO      Or   • oxyCODONE  7 5 mg Oral Q4H PRN Maddie Locket, DO     • pantoprazole  40 mg Oral Early Morning Maddie Locket, DO     • polyethylene glycol  17 g Oral BID Maddie Locket, DO     • potassium chloride  20 mEq Oral Daily Merlinda Park, MD     • QUEtiapine  25 mg Oral HS Maddie Locket, DO     • senna  1 tablet Oral BID Maddie Locket, DO     • sertraline  175 mg Oral Daily Stephanie Gamez DO     • warfarin  3 mg Oral Daily (warfarin) Bonilla Isbell MD          Today, Patient Was Seen By: Bonilla Isbell MD

## 2022-12-29 NOTE — UTILIZATION REVIEW
Continued Stay Review    Date: 12/29/22                     Current Patient Class: IP Current Level of Care: MS    HPI:62 y o  female initially admitted on 10/21    Assessment/Plan:   Pt has good pain control  Not using much analgesia at all  Continues on Doxycycline through 1/2/23  Had R AKA VAC change yesterday and continues to have suction to 125 mm HG  Will change q MWF  Pt has been tolerating VAC change well at bedside  No complaints today        Vital Signs:   12/29/22 0755 98 1 °F (36 7 °C) 77 20 156/68 -- 94 % None (Room air) Lying   12/28/22 2319 97 2 °F (36 2 °C) Abnormal  91 20 165/71 107 91 % None (Room air) Lying   12/28/22 1556 97 9 °F (36 6 °C) 78 18 144/76 107 95 % None (Room air) Lying   12/28/22 0734 98 °F (36 7 °C) 77 18 155/81 107 96 % None (Room air) Lying   12/27/22 2246 98 6 °F (37 °C) 86 18 152/67 96 95 % None (Room air) Lying   12/27/22 1908 -- -- -- -- -- -- None (Room air) --   12/27/22 1520 98 °F (36 7 °C) 85 18 135/71 88 95 % -- --   12/27/22 0742 97 7 °F (36 5 °C) 81 18 167/74 106 94 % None (Room air) Lying   12/27/22 0328 97 7 °F (36 5 °C) 81 18 164/69 99 95 % -- Lying     Pertinent Labs/Diagnostic Results:       Results from last 7 days   Lab Units 12/27/22  0455 12/24/22  0459 12/23/22  0656   WBC Thousand/uL 8 29 4 97 4 57   HEMOGLOBIN g/dL 7 9* 7 9* 7 4*   HEMATOCRIT % 27 0* 26 1* 24 9*   PLATELETS Thousands/uL 370 227 200   NEUTROS ABS Thousands/µL 4 95 3 28 3 24         Results from last 7 days   Lab Units 12/24/22  0459 12/23/22  0656   SODIUM mmol/L 141 141   POTASSIUM mmol/L 3 6 3 4*   CHLORIDE mmol/L 104 104   CO2 mmol/L 29 27   ANION GAP mmol/L 8 10   BUN mg/dL 8 8   CREATININE mg/dL 0 65 0 65   EGFR ml/min/1 73sq m 96 96   CALCIUM mg/dL 8 1* 7 9*         Results from last 7 days   Lab Units 12/29/22  1113 12/29/22  0756 12/28/22  2127 12/28/22  1555 12/28/22  1110 12/28/22  0732 12/27/22  2107 12/27/22  1630 12/27/22  1125 12/27/22  0740 12/26/22  2058 12/26/22  1621 POC GLUCOSE mg/dl 161* 109 142* 159* 114 98 112 175* 127 107 132 154*     Results from last 7 days   Lab Units 12/24/22  0459 12/23/22  0656   GLUCOSE RANDOM mg/dL 94 108     Results from last 7 days   Lab Units 12/29/22  0503 12/28/22  0509 12/27/22  0455   PROTIME seconds 28 6* 28 4* 33 3*   INR  2 71* 2 68* 3 30*     Medications:   Scheduled Medications:  amLODIPine, 10 mg, Oral, Daily  atorvastatin, 40 mg, Oral, Daily With Dinner  buPROPion, 150 mg, Oral, Daily  clopidogrel, 75 mg, Oral, Daily  collagenase, , Topical, Daily  doxazosin, 4 mg, Oral, HS  doxycycline hyclate, 100 mg, Oral, Q12H MICHELLE  ferrous sulfate, 325 mg, Oral, Daily With Lunch  gabapentin, 400 mg, Oral, TID  insulin glargine, 10 Units, Subcutaneous, HS  insulin lispro, 1-6 Units, Subcutaneous, TID AC  lisinopril, 40 mg, Oral, Daily  methocarbamol, 1,000 mg, Oral, Q8H MICHELLE  metoprolol succinate, 100 mg, Oral, Daily  nystatin, , Topical, BID  oxyCODONE, 10 mg, Oral, Q12H MICHELLE  pantoprazole, 40 mg, Oral, Early Morning  polyethylene glycol, 17 g, Oral, BID  potassium chloride, 20 mEq, Oral, Daily  QUEtiapine, 25 mg, Oral, HS  senna, 1 tablet, Oral, BID  sertraline, 175 mg, Oral, Daily  warfarin, 3 mg, Oral, Daily (warfarin)      Continuous IV Infusions:     PRN Meds:  acetaminophen, 650 mg, Oral, Q6H PRN  ALPRAZolam, 0 25 mg, Oral, Daily PRN  aluminum-magnesium hydroxide-simethicone, 30 mL, Oral, Q4H PRN - x 1 12/28  labetalol, 10 mg, Intravenous, Q6H PRN  metoclopramide, 10 mg, Intravenous, Q6H PRN  naloxone, 0 04 mg, Intravenous, Q1MIN PRN  ondansetron, 4 mg, Intravenous, Q6H PRN  oxyCODONE, 2 5 mg, Oral, Q4H PRN   Or  oxyCODONE, 5 mg, Oral, Q4H PRN   Or  oxyCODONE, 7 5 mg, Oral, Q4H PRN    Discharge Plan: rehab     Network Utilization Review Department  ATTENTION: Please call with any questions or concerns to [phone] and carefully listen to the prompts so that you are directed to the right person   All voicemails are confidential   Elin Buerger all requests for admission clinical reviews, approved or denied determinations and any other requests to dedicated fax number below belonging to the campus where the patient is receiving treatment   List of dedicated fax numbers for the Facilities:  [address] [place] [address] DENIALS (Administrative/Medical Necessity) [phone]   [address] (Maternity/NICU/Pediatrics) [phone]   [address] [place] [phone]   [place] [address] [phone]   [address] [place] [address] [phone]   [address] [facility] [address] 815 [facility] [phone]

## 2022-12-29 NOTE — ASSESSMENT & PLAN NOTE
65year old female former smoker w/HTN, HLD, uncontrolled type II DM (A1c 9 8), hx CVA, presenting with nonhealing extensive L heel ulceration and R hallux and 5th digit ulceration  Vascular surgery consulted for input  S/p multiple b/l endovascular interventions  JUSTIN  R hemispheric CVA    S/p L AKA 11/23/22 [facility])  S/p R AKA, wound debridement 12/1/22 [facility])  S/p R AKA wound/stump washout and VAC placement 12/16/22 [facility])  S/p R AKA wound/stump VAC change, L AKA stump washout/packing 12/19/22 ([facility])    Wound Cx: enterobacter cloacae     Recommendations:  - Bilateral tissue loss in the setting of uncontrolled type II DM and NYDIA on admission, now s/p multiple angiograms w/intervention    - Endovascular interventions, c/b atheroembolic event to the RLE and JUSTIN with R hemispheric CVA  - Now s/p L AKA on 11/23/22 and R AKA w/ proximal thigh wound debridements 12/1/22    - R AKA stump with incisional dehiscence and thigh wounds requiring debridement  Taken to the OR on 12/16 for washout and VAC placement with return to the OR 12/19 for VAC change  - L AKA stump incision dehiscence now closed  Continue local wound care  - Appreciate ID for assistance with abx- L stump cultures growing out enterobacter cloacae  Patient stable on current plan per ID- doxycycline through 1/2/23  - Right AKA wound VAC changed 12/28/22  Continue wound VAC therapy  Continuous suction 125 mmHg   change MWF-next change tomorrow  - Incentive spirometer   - Medical management with statin, Plavix  - Continue coumadin, INR 2 71  - Medical management and glucose control per SLIM  - Continue oral pain regimen, adjusted to long acting which appears to be controlling patient's pain well   - Appreciate psych input and management - addition of wellbutrin and adjustment of zoloft   - Disposition planning for rehab: will follow up with CM   - D/w Dr Shaw Goodman

## 2022-12-29 NOTE — PROGRESS NOTES
[facility] [facility]  Progress Note - [address] 1960, 65 y o  female MRN: [id number]  Unit/Bed#: E4 -01 Encounter: [id number]  Primary Care Provider: ALEX Bland   Date and time admitted to hospital: 10/21/2022  7:58 PM    * PAD (peripheral artery disease) [facility])  Assessment & Plan  65year old female former smoker w/HTN, HLD, uncontrolled type II DM (A1c 9 8), hx CVA, presenting with nonhealing extensive L heel ulceration and R hallux and 5th digit ulceration  Vascular surgery consulted for input  S/p multiple b/l endovascular interventions  JUSTIN  R hemispheric CVA    S/p L AKA 11/23/22 [facility])  S/p R AKA, wound debridement 12/1/22 [facility])  S/p R AKA wound/stump washout and VAC placement 12/16/22 [facility])  S/p R AKA wound/stump VAC change, L AKA stump washout/packing 12/19/22 ([facility])    Wound Cx: enterobacter cloacae     Recommendations:  - Bilateral tissue loss in the setting of uncontrolled type II DM and NYDIA on admission, now s/p multiple angiograms w/intervention    - Endovascular interventions, c/b atheroembolic event to the RLE and JUSTIN with R hemispheric CVA  - Now s/p L AKA on 11/23/22 and R AKA w/ proximal thigh wound debridements 12/1/22    - R AKA stump with incisional dehiscence and thigh wounds requiring debridement  Taken to the OR on 12/16 for washout and VAC placement with return to the OR 12/19 for VAC change  - L AKA stump incision dehiscence now closed  Continue local wound care  - Appreciate ID for assistance with abx- L stump cultures growing out enterobacter cloacae  Patient stable on current plan per ID- doxycycline through 1/2/23  - Right AKA wound VAC changed 12/28/22  Continue wound VAC therapy  Continuous suction 125 mmHg   change MWF-next change tomorrow  - Incentive spirometer   - Medical management with statin, Plavix  - Continue coumadin, INR 2 71  - Medical management and glucose control per SLIM  - Continue oral pain regimen, adjusted to long acting which appears to be controlling patient's pain well   - Appreciate psych input and management - addition of wellbutrin and adjustment of zoloft   - Disposition planning for rehab: will follow up with CM   - D/w Dr Lina Skinner: Patient seen and examined  Offers no complaints  Wound VAC changed yesterday which patient tolerated well  VAC remains to suction  Remains hemodynamically stable    Vitals:  /68 (BP Location: Left arm)   Pulse 77   Temp 98 1 °F (36 7 °C) (Temporal)   Resp 20   Ht 5' 4" (1 626 m)   Wt 69 4 kg (153 lb)   SpO2 94%   BMI 26 26 kg/m²     I/Os:  I/O last 3 completed shifts:  In: -   Out: 950 [Urine:950]  No intake/output data recorded  Lab Results and Cultures:   CBC with diff: Lab Results   Component Value Date    WBC 8 29 12/27/2022    HGB 7 9 (L) 12/27/2022    HCT 27 0 (L) 12/27/2022    MCV 87 12/27/2022     12/27/2022    MCH 25 3 (L) 12/27/2022    MCHC 29 3 (L) 12/27/2022    RDW 16 4 (H) 12/27/2022    MPV 8 4 (L) 12/27/2022    NRBC 0 12/27/2022   ,   BMP/CMP:  Lab Results   Component Value Date    SODIUM 141 12/24/2022    K 3 6 12/24/2022     12/24/2022    CO2 29 12/24/2022    BUN 8 12/24/2022    CREATININE 0 65 12/24/2022    CALCIUM 8 1 (L) 12/24/2022    AST 27 12/22/2022    ALT 13 12/22/2022    ALKPHOS 167 (H) 12/22/2022    EGFR 96 12/24/2022   ,   Lipid Panel: No results found for: CHOL,   Coags:   Lab Results   Component Value Date    PTT 95 (H) 12/12/2022    INR 2 71 (H) 12/29/2022   ,     Blood Culture:   Lab Results   Component Value Date    BLOODCX No Growth After 5 Days   11/13/2022    BLOODCX Staphylococcus coagulase negative (A) 11/13/2022   ,   Urinalysis: Lab Results   Component Value Date    COLORU Yellow 11/12/2022    CLARITYU Slightly Cloudy 11/12/2022    SPECGRAV 1 025 11/12/2022    PHUR 5 5 11/12/2022    PHUR 6 5 02/23/2018    LEUKOCYTESUR Trace (A) 11/12/2022    NITRITE Positive (A) 11/12/2022 GLUCOSEU Negative 11/12/2022    KETONESU Negative 11/12/2022    BILIRUBINUR Negative 11/12/2022    BLOODU Large (A) 11/12/2022   ,   Urine Culture:   Lab Results   Component Value Date    URINECX >100,000 cfu/ml Enterobacter cloacae complex (A) 11/12/2022    URINECX 70,000-79,000 cfu/ml Kluyveromyces marxianus (A) 11/12/2022   ,   Wound Culure: No results found for: WOUNDCULT    Medications:  Current Facility-Administered Medications   Medication Dose Route Frequency   • acetaminophen (TYLENOL) tablet 650 mg  650 mg Oral Q6H PRN   • ALPRAZolam (XANAX) tablet 0 25 mg  0 25 mg Oral Daily PRN   • aluminum-magnesium hydroxide-simethicone (MYLANTA) oral suspension 30 mL  30 mL Oral Q4H PRN   • amLODIPine (NORVASC) tablet 10 mg  10 mg Oral Daily   • atorvastatin (LIPITOR) tablet 40 mg  40 mg Oral Daily With Dinner   • buPROPion (WELLBUTRIN XL) 24 hr tablet 150 mg  150 mg Oral Daily   • clopidogrel (PLAVIX) tablet 75 mg  75 mg Oral Daily   • collagenase (SANTYL) ointment   Topical Daily   • doxazosin (CARDURA) tablet 4 mg  4 mg Oral HS   • doxycycline hyclate (VIBRAMYCIN) capsule 100 mg  100 mg Oral Q12H [facility]   • ferrous sulfate tablet 325 mg  325 mg Oral Daily With Lunch   • gabapentin (NEURONTIN) capsule 400 mg  400 mg Oral TID   • insulin glargine (LANTUS) subcutaneous injection 10 Units 0 1 mL  10 Units Subcutaneous HS   • insulin lispro (HumaLOG) 100 units/mL subcutaneous injection 1-6 Units  1-6 Units Subcutaneous TID AC   • labetalol (NORMODYNE) injection 10 mg  10 mg Intravenous Q6H PRN   • lisinopril (ZESTRIL) tablet 40 mg  40 mg Oral Daily   • methocarbamol (ROBAXIN) tablet 1,000 mg  1,000 mg Oral Q8H [facility]   • metoclopramide (REGLAN) injection 10 mg  10 mg Intravenous Q6H PRN   • metoprolol succinate (TOPROL-XL) 24 hr tablet 100 mg  100 mg Oral Daily   • naloxone (NARCAN) 0 04 mg/mL syringe 0 04 mg  0 04 mg Intravenous Q1MIN PRN   • nystatin (MYCOSTATIN) powder   Topical BID   • ondansetron (ZOFRAN) injection 4 mg 4 mg Intravenous Q6H PRN   • oxyCODONE (OxyCONTIN) 12 hr tablet 10 mg  10 mg Oral Q12H [facility]   • oxyCODONE (ROXICODONE) IR tablet 2 5 mg  2 5 mg Oral Q4H PRN    Or   • oxyCODONE (ROXICODONE) IR tablet 5 mg  5 mg Oral Q4H PRN    Or   • oxyCODONE (ROXICODONE) IR tablet 7 5 mg  7 5 mg Oral Q4H PRN   • pantoprazole (PROTONIX) EC tablet 40 mg  40 mg Oral Early Morning   • polyethylene glycol (MIRALAX) packet 17 g  17 g Oral BID   • potassium chloride (K-DUR,KLOR-CON) CR tablet 20 mEq  20 mEq Oral Daily   • QUEtiapine (SEROquel) tablet 25 mg  25 mg Oral HS   • senna (SENOKOT) tablet 8 6 mg  1 tablet Oral BID   • sertraline (ZOLOFT) tablet 175 mg  175 mg Oral Daily   • warfarin (COUMADIN) tablet 3 mg  3 mg Oral Daily (warfarin)       Imaging:  Reviewed    Physical Exam:    General: Alert and oriented  In no acute distress  CV: Regular rate   Respiratory: Normal effort   Abdominal: Soft, non-distended   Extremities: Bilateral AKA  R AKA stump with dressing in place  Neurologic:  At baseline       Adrien Lowery PA-C  12/29/2022

## 2022-12-29 NOTE — ASSESSMENT & PLAN NOTE
· Noted to have change in mental status on 11/13/2022    · CT confirmed moderate right parietal lobe stroke, likely embolic in the setting of HIT with thrombosis  · She was treated with argatroban/coumadin bridge  · INR 2 71 will give warfarin 3 mg today, monitor INR closely  · Goal INR 2-3

## 2022-12-30 LAB
GLUCOSE SERPL-MCNC: 112 MG/DL (ref 65–140)
GLUCOSE SERPL-MCNC: 117 MG/DL (ref 65–140)
GLUCOSE SERPL-MCNC: 179 MG/DL (ref 65–140)
INR PPP: 2.56 (ref 0.84–1.19)
PROTHROMBIN TIME: 27.4 SECONDS (ref 11.6–14.5)

## 2022-12-30 PROCEDURE — 97530 THERAPEUTIC ACTIVITIES: CPT

## 2022-12-30 PROCEDURE — 97605 NEG PRS WND THER DME<=50SQCM: CPT | Performed by: PHYSICIAN ASSISTANT

## 2022-12-30 PROCEDURE — 97535 SELF CARE MNGMENT TRAINING: CPT

## 2022-12-30 PROCEDURE — 99233 SBSQ HOSP IP/OBS HIGH 50: CPT | Performed by: INTERNAL MEDICINE

## 2022-12-30 PROCEDURE — 97110 THERAPEUTIC EXERCISES: CPT

## 2022-12-30 PROCEDURE — 85610 PROTHROMBIN TIME: CPT | Performed by: INTERNAL MEDICINE

## 2022-12-30 PROCEDURE — 99024 POSTOP FOLLOW-UP VISIT: CPT | Performed by: SURGERY

## 2022-12-30 PROCEDURE — 82948 REAGENT STRIP/BLOOD GLUCOSE: CPT

## 2022-12-30 PROCEDURE — 99232 SBSQ HOSP IP/OBS MODERATE 35: CPT | Performed by: INTERNAL MEDICINE

## 2022-12-30 RX ORDER — HYDROMORPHONE HCL/PF 1 MG/ML
0.5 SYRINGE (ML) INJECTION ONCE
Status: COMPLETED | OUTPATIENT
Start: 2022-12-30 | End: 2022-12-30

## 2022-12-30 RX ORDER — MICONAZOLE NITRATE 20 MG/G
CREAM TOPICAL 2 TIMES DAILY
Status: DISCONTINUED | OUTPATIENT
Start: 2022-12-30 | End: 2023-01-11

## 2022-12-30 RX ADMIN — BUPROPION HYDROCHLORIDE 150 MG: 150 TABLET, FILM COATED, EXTENDED RELEASE ORAL at 09:31

## 2022-12-30 RX ADMIN — HYDROMORPHONE HYDROCHLORIDE 0.5 MG: 1 INJECTION, SOLUTION INTRAMUSCULAR; INTRAVENOUS; SUBCUTANEOUS at 10:17

## 2022-12-30 RX ADMIN — OXYCODONE HYDROCHLORIDE 10 MG: 10 TABLET, FILM COATED, EXTENDED RELEASE ORAL at 09:31

## 2022-12-30 RX ADMIN — GABAPENTIN 400 MG: 400 CAPSULE ORAL at 18:07

## 2022-12-30 RX ADMIN — PANTOPRAZOLE SODIUM 40 MG: 40 TABLET, DELAYED RELEASE ORAL at 05:27

## 2022-12-30 RX ADMIN — WARFARIN SODIUM 3 MG: 3 TABLET ORAL at 18:07

## 2022-12-30 RX ADMIN — DOXYCYCLINE 100 MG: 100 CAPSULE ORAL at 09:31

## 2022-12-30 RX ADMIN — AMLODIPINE BESYLATE 10 MG: 10 TABLET ORAL at 09:31

## 2022-12-30 RX ADMIN — SENNOSIDES 8.6 MG: 8.6 TABLET, FILM COATED ORAL at 18:07

## 2022-12-30 RX ADMIN — SERTRALINE HYDROCHLORIDE 175 MG: 100 TABLET ORAL at 09:31

## 2022-12-30 RX ADMIN — GABAPENTIN 400 MG: 400 CAPSULE ORAL at 21:57

## 2022-12-30 RX ADMIN — ATORVASTATIN CALCIUM 40 MG: 40 TABLET, FILM COATED ORAL at 18:07

## 2022-12-30 RX ADMIN — CLOPIDOGREL BISULFATE 75 MG: 75 TABLET ORAL at 09:31

## 2022-12-30 RX ADMIN — MICONAZOLE NITRATE: 20 CREAM TOPICAL at 20:55

## 2022-12-30 RX ADMIN — INSULIN GLARGINE 10 UNITS: 100 INJECTION, SOLUTION SUBCUTANEOUS at 21:57

## 2022-12-30 RX ADMIN — METOPROLOL SUCCINATE 100 MG: 100 TABLET, FILM COATED, EXTENDED RELEASE ORAL at 09:31

## 2022-12-30 RX ADMIN — POTASSIUM CHLORIDE 20 MEQ: 1500 TABLET, EXTENDED RELEASE ORAL at 09:31

## 2022-12-30 RX ADMIN — METHOCARBAMOL 1000 MG: 500 TABLET ORAL at 05:27

## 2022-12-30 RX ADMIN — GABAPENTIN 400 MG: 400 CAPSULE ORAL at 09:31

## 2022-12-30 RX ADMIN — INSULIN LISPRO 1 UNITS: 100 INJECTION, SOLUTION INTRAVENOUS; SUBCUTANEOUS at 18:07

## 2022-12-30 RX ADMIN — LISINOPRIL 40 MG: 20 TABLET ORAL at 09:31

## 2022-12-30 RX ADMIN — FERROUS SULFATE TAB 325 MG (65 MG ELEMENTAL FE) 325 MG: 325 (65 FE) TAB at 18:07

## 2022-12-30 RX ADMIN — OXYCODONE HYDROCHLORIDE 10 MG: 10 TABLET, FILM COATED, EXTENDED RELEASE ORAL at 21:57

## 2022-12-30 RX ADMIN — NYSTATIN: 100000 POWDER TOPICAL at 09:32

## 2022-12-30 RX ADMIN — QUETIAPINE FUMARATE 25 MG: 25 TABLET ORAL at 21:57

## 2022-12-30 RX ADMIN — DOXYCYCLINE 100 MG: 100 CAPSULE ORAL at 21:57

## 2022-12-30 RX ADMIN — METHOCARBAMOL 1000 MG: 500 TABLET ORAL at 21:57

## 2022-12-30 RX ADMIN — DOXAZOSIN 4 MG: 4 TABLET ORAL at 21:57

## 2022-12-30 RX ADMIN — GABAPENTIN 400 MG: 400 CAPSULE ORAL at 00:22

## 2022-12-30 NOTE — PROCEDURES
Vascular Surgery  Rebeka Hartley 65 y o  female MRN: [id number]  Unit/Bed#: E4 -01 Encounter: [id number]    V  A C   Procedure Note    Date: 12/30/22  Time: 9:30    Location of wound: R AKA stump    Sponges removed:  3 Black Sponges  0 White Sponges    Dimensions of wound: 14 cm x 2 cm x 0 5 cm    Description of wound: Pink wound bed with healthy granulation tissue            Sponges placed:  1 Black Sponges  0 White Sponges    VAC settings:  125 mmHg  Continuous    0 5mg Dilaudid given to patient pre-VAC change  3 Black sponges removed  Wound irrigated and cleaned  Telfa placed over areas of skin breakdown/space between two open wounds  One long sponge placed in both wound beds over telfa (covering healthy skin)  Pt tolerated procedure well  VAC sticker placed to wound dressing, per protocol      Patric Singh PA-C  12/30/2022

## 2022-12-30 NOTE — ASSESSMENT & PLAN NOTE
· She was initially admitted for nonhealing wound of the left lower extremity, requiring multiple endovascular interventions and ultimately underwent left AKA on 11/23/2022 and right AKA on 12/1/22  · S/p right stump debridement and vac placement 12/16/22  · For additional right stump washout and VAC exchange- 12/19/2022  per surgery    · Continue pain control, incentive spirometer  · Eventual short-term rehab placement when medically stable  · Management per primary service

## 2022-12-30 NOTE — PROGRESS NOTES
[facility]  Progress Note - [facility] 1960, 65 y o  female MRN: [id number]  Unit/Bed#: E4 -01 Encounter: [id number]  Primary Care Provider: ALEX Marks   Date and time admitted to hospital: 10/21/2022  7:58 PM    * PAD (peripheral artery disease) [facility])  Assessment & Plan  65year old female former smoker w/HTN, HLD, uncontrolled type II DM (A1c 9 8), hx CVA, presenting with nonhealing extensive L heel ulceration and R hallux and 5th digit ulceration  Vascular surgery consulted for input  S/p multiple b/l endovascular interventions  JUSTIN  R hemispheric CVA    S/p L AKA 11/23/22 [facility])  S/p R AKA, wound debridement 12/1/22 [facility])  S/p R AKA wound/stump washout and VAC placement 12/16/22 Heidy Haq  S/p R AKA wound/stump VAC change, L AKA stump washout/packing 12/19/22 (Celestino)    Wound Cx: enterobacter cloacae     Recommendations:  - Bilateral tissue loss in the setting of uncontrolled type II DM and NYDIA on admission, now s/p multiple angiograms w/intervention    - Endovascular interventions, c/b atheroembolic event to the RLE and JUSTIN with R hemispheric CVA  - Now s/p L AKA on 11/23/22 and R AKA w/ proximal thigh wound debridements 12/1/22    - R AKA stump with incisional dehiscence and thigh wounds requiring debridement  Taken to the OR on 12/16 for washout and VAC placement with return to the OR 12/19 for VAC change  - L AKA stump incision dehiscence now closed  Continue local wound care  - Appreciate ID for assistance with abx- L stump cultures growing out enterobacter cloacae  Patient stable on current plan per ID- doxycycline through 1/2/23  - Right AKA wound VAC changed today at bedside  Continue wound VAC therapy  Continuous suction 125 mmHg  change MWF-next change Monday   May be able to discontinue on Monday  - Incentive spirometer   - Medical management with statin, Plavix  - Continue coumadin  - Medical management and glucose control per SLIM  - Continue oral pain regimen, adjusted to long acting which appears to be controlling patient's pain well   - Appreciate psych input and management - addition of wellbutrin and adjustment of zoloft   - Disposition planning for rehab: will follow up with CM, currently pending STR acceptance          Vitals:  /55 (BP Location: Left arm)   Pulse 80   Temp 98 °F (36 7 °C) (Temporal)   Resp 20   Ht 5' 4" (1 626 m)   Wt 68 5 kg (151 lb)   SpO2 92%   BMI 25 92 kg/m²     I/Os:  I/O last 3 completed shifts: In: 200 [P O :200]  Out: 400 [Urine:400]  No intake/output data recorded  Lab Results and Cultures:   CBC with diff: Lab Results   Component Value Date    WBC 8 29 12/27/2022    HGB 7 9 (L) 12/27/2022    HCT 27 0 (L) 12/27/2022    MCV 87 12/27/2022     12/27/2022    MCH 25 3 (L) 12/27/2022    MCHC 29 3 (L) 12/27/2022    RDW 16 4 (H) 12/27/2022    MPV 8 4 (L) 12/27/2022    NRBC 0 12/27/2022   ,   BMP/CMP:  Lab Results   Component Value Date    SODIUM 141 12/24/2022    K 3 6 12/24/2022     12/24/2022    CO2 29 12/24/2022    BUN 8 12/24/2022    CREATININE 0 65 12/24/2022    CALCIUM 8 1 (L) 12/24/2022    AST 27 12/22/2022    ALT 13 12/22/2022    ALKPHOS 167 (H) 12/22/2022    EGFR 96 12/24/2022   ,   Lipid Panel: No results found for: CHOL,   Coags:   Lab Results   Component Value Date    PTT 95 (H) 12/12/2022    INR 2 56 (H) 12/30/2022   ,     Blood Culture:   Lab Results   Component Value Date    BLOODCX No Growth After 5 Days   11/13/2022    BLOODCX Staphylococcus coagulase negative (A) 11/13/2022   ,   Urinalysis: Lab Results   Component Value Date    COLORU Yellow 11/12/2022    CLARITYU Slightly Cloudy 11/12/2022    SPECGRAV 1 025 11/12/2022    PHUR 5 5 11/12/2022    PHUR 6 5 02/23/2018    LEUKOCYTESUR Trace (A) 11/12/2022    NITRITE Positive (A) 11/12/2022    GLUCOSEU Negative 11/12/2022    KETONESU Negative 11/12/2022    BILIRUBINUR Negative 11/12/2022    BLOODU Large (A) 11/12/2022   ,   Urine Culture:   Lab Results   Component Value Date    URINECX >100,000 cfu/ml Enterobacter cloacae complex (A) 11/12/2022    URINECX 70,000-79,000 cfu/ml Kluyveromyces marxianus (A) 11/12/2022   ,   Wound Culure: No results found for: WOUNDCULT    Medications:  Current Facility-Administered Medications   Medication Dose Route Frequency   • acetaminophen (TYLENOL) tablet 650 mg  650 mg Oral Q6H PRN   • ALPRAZolam (XANAX) tablet 0 25 mg  0 25 mg Oral Daily PRN   • aluminum-magnesium hydroxide-simethicone (MYLANTA) oral suspension 30 mL  30 mL Oral Q4H PRN   • amLODIPine (NORVASC) tablet 10 mg  10 mg Oral Daily   • atorvastatin (LIPITOR) tablet 40 mg  40 mg Oral Daily With Dinner   • buPROPion (WELLBUTRIN XL) 24 hr tablet 150 mg  150 mg Oral Daily   • clopidogrel (PLAVIX) tablet 75 mg  75 mg Oral Daily   • collagenase (SANTYL) ointment   Topical Daily   • doxazosin (CARDURA) tablet 4 mg  4 mg Oral HS   • doxycycline hyclate (VIBRAMYCIN) capsule 100 mg  100 mg Oral Q12H [address]   • ferrous sulfate tablet 325 mg  325 mg Oral Daily With Lunch   • gabapentin (NEURONTIN) capsule 400 mg  400 mg Oral TID   • insulin glargine (LANTUS) subcutaneous injection 10 Units 0 1 mL  10 Units Subcutaneous HS   • insulin lispro (HumaLOG) 100 units/mL subcutaneous injection 1-6 Units  1-6 Units Subcutaneous TID AC   • labetalol (NORMODYNE) injection 10 mg  10 mg Intravenous Q6H PRN   • lisinopril (ZESTRIL) tablet 40 mg  40 mg Oral Daily   • methocarbamol (ROBAXIN) tablet 1,000 mg  1,000 mg Oral Q8H [address]   • metoclopramide (REGLAN) injection 10 mg  10 mg Intravenous Q6H PRN   • metoprolol succinate (TOPROL-XL) 24 hr tablet 100 mg  100 mg Oral Daily   • moisture barrier miconazole 2% cream (aka GINA MOISTURE BARRIER ANTIFUNGAL CREAM)   Topical BID   • naloxone (NARCAN) 0 04 mg/mL syringe 0 04 mg  0 04 mg Intravenous Q1MIN PRN   • ondansetron (ZOFRAN) injection 4 mg  4 mg Intravenous Q6H PRN   • oxyCODONE (OxyCONTIN) 12 hr tablet 10 mg  10 mg Oral Q12H [address]   • oxyCODONE (ROXICODONE) IR tablet 2 5 mg  2 5 mg Oral Q4H PRN    Or   • oxyCODONE (ROXICODONE) IR tablet 5 mg  5 mg Oral Q4H PRN    Or   • oxyCODONE (ROXICODONE) IR tablet 7 5 mg  7 5 mg Oral Q4H PRN   • pantoprazole (PROTONIX) EC tablet 40 mg  40 mg Oral Early Morning   • polyethylene glycol (MIRALAX) packet 17 g  17 g Oral BID   • potassium chloride (K-DUR,KLOR-CON) CR tablet 20 mEq  20 mEq Oral Daily   • QUEtiapine (SEROquel) tablet 25 mg  25 mg Oral HS   • senna (SENOKOT) tablet 8 6 mg  1 tablet Oral BID   • sertraline (ZOLOFT) tablet 175 mg  175 mg Oral Daily   • warfarin (COUMADIN) tablet 3 mg  3 mg Oral Daily (warfarin)       Physical Exam:  General: No acute distress  Neuro: alert and oriented  HEENT: moist mucous membranes  CV: Well perfused, regular rate and rhythm  Lungs: Normal work of breathing, no increased respiratory effort  Abdomen: Soft, non-tender, non-distended  Extremities: No edema, clubbing or cyanosis   L AKA stump incision intact with staples in place, R AKA with wound vac good seal  Skin: Warm, dry      Supriya Morse MD  12/30/2022

## 2022-12-30 NOTE — PROGRESS NOTES
[address] [facility]  Progress Note - [address] [facility] [address] 1960, 65 y o  female MRN: [id number]  Unit/Bed#: E4 -01 Encounter: [id number]  Primary Care Provider: ALEX Posada   Date and time admitted to hospital: 10/21/2022  7:58 PM    * PAD (peripheral artery disease) ([facility] [address] )  Assessment & Plan  · She was initially admitted for nonhealing wound of the left lower extremity, requiring multiple endovascular interventions and ultimately underwent left AKA on 11/23/2022 and right AKA on 12/1/22  · S/p right stump debridement and vac placement 12/16/22  · For additional right stump washout and VAC exchange- 12/19/2022  per surgery  · Continue pain control, incentive spirometer  · Eventual short-term rehab placement when medically stable  · Management per primary service    Febrile  Assessment & Plan  Intermittent fever; but currently afebrile x > 48 hours  UA positive, but likely contaminant rather than an actual urinary tract infection  Possibly post op fever  · Continue to have fever; wound cultures from recent wound washout with enterobacteria  · ID recommending 10 days of doxycycline twice daily; abx through 1/2    HIT (heparin-induced thrombocytopenia)  Assessment & Plan  · Positive heparin antibody on 11/11/2022  · Completed 5 days of overlap with argatroban and Coumadin   · Continue coumadin for minimum of 4 weeks  · Goal INR 2-3    Diabetic ulcer of heel associated with diabetes mellitus due to underlying condition [facility])  Assessment & Plan  S/p left aka 11/23/2022  · Post-op wound management per vascular surgery  · Await next step in management from vascular surgery, underwent surgical washout of right stump debridement of left stump on 12/19  · Pain appears well controlled on OxyContin 10 twice daily and gabapentin 400mg tid    · As needed pain medications taken sparingly    Acute on chronic anemia  Assessment & Plan  · She has had multiple  blood transfusions during this admission  · Last blood transfusion was on  12/15/2022 prior to her surgery  · Monitor  · Hemoglobin continues to decline, FOBT negative  · Review of recent iron panel appears to show picture of anemia of chronic disease  · Additionally, iron noted to be low, will initiate iron supplementation  · appears to be stable at 7 9, monitor    Type 2 diabetes mellitus with hyperglycemia, with long-term current use of insulin [facility])  Assessment & Plan  Lab Results   Component Value Date    HGBA1C 9 8 (H) 10/25/2022     Recent Labs     12/29/22  1113 12/29/22  1614 12/29/22  2106 12/30/22  0757   POCGLU 161* 139 165* 112     · Significantly reduced regimen from home due to poor oral intake  · Goal sugar < 180  · Continue Lantus to 10 units at night  · Continue sliding scale insulin    Stroke [facility])  Assessment & Plan  · Noted to have change in mental status on 11/13/2022  · CT confirmed moderate right parietal lobe stroke, likely embolic in the setting of HIT with thrombosis  · She was treated with argatroban/coumadin bridge  · INR 2 71 will give warfarin 3 mg today, monitor INR closely  · Goal INR 2-3    Benign essential hypertension  Assessment & Plan  · Continue amlodipine 10 mg daily, doxazosin 4 mg at bedtime, metoprolol 100 mg daily, lisinopril 40 mg daily with hold parameters    Depression, recurrent ([address] )  Assessment & Plan  Evaluated by Psychiatry x 2 on this admission  ·  continue Zoloft 175 mg daily, Wellbutrin 150 mg daily, Seroquel 25 mg at bedtime  VTE Pharmacologic Prophylaxis:   Pharmacologic: warfarin    Patient Centered Rounds: I have performed bedside rounds with nursing staff today  Discussions with Specialists or Other Care Team Provider: Vascular surgery    Education and Discussions with Family / Patient: patient    Time Spent for Care: 20 minutes  More than 50% of total time spent on counseling and coordination of care as described above      Current Length of Stay: 69 day(s)    Current Patient Status: Inpatient   Certification Statement: The patient will continue to require additional inpatient hospital stay due to Pending rehab placement    Code Status: Level 1 - Full Code      Subjective:   Patient seen and examined at bedside, comfortable, denies any complaints    Objective:     Vitals:   Temp (24hrs), Av 4 °F (36 9 °C), Min:98 °F (36 7 °C), Max:98 9 °F (37 2 °C)    Temp:  [98 °F (36 7 °C)-98 9 °F (37 2 °C)] 98 °F (36 7 °C)  HR:  [74-80] 80  Resp:  [20] 20  BP: (106-163)/(55-74) 106/55  SpO2:  [92 %-95 %] 92 %  Body mass index is 25 92 kg/m²  Input and Output Summary (last 24 hours): Intake/Output Summary (Last 24 hours) at 2022 1631  Last data filed at 2022 0022  Gross per 24 hour   Intake 200 ml   Output 400 ml   Net -200 ml       Physical Exam:    Constitutional: Patient is oriented to person, place and time, no acute distress  HEENT:  Normocephalic, atraumatic  Cardiovascular: Normal S1S2, RRR, No murmurs/rubs/gallops appreciated  Pulmonary:  Bilateral air entry, No rhonchi/rales/wheezing appreciated  Abdominal: Soft, Bowel sounds present, Non-tender, Non-distended  Extremities: Bilateral AKA, right AKA with wound VAC in place  Neurological: Awake, alert  Skin:  Warm, dry    Additional Data:     Labs:    Results from last 7 days   Lab Units 22  0455   WBC Thousand/uL 8 29   HEMOGLOBIN g/dL 7 9*   HEMATOCRIT % 27 0*   PLATELETS Thousands/uL 370   NEUTROS PCT % 59   LYMPHS PCT % 23   MONOS PCT % 9   EOS PCT % 6     Results from last 7 days   Lab Units 22  0459   POTASSIUM mmol/L 3 6   CHLORIDE mmol/L 104   CO2 mmol/L 29   BUN mg/dL 8   CREATININE mg/dL 0 65   CALCIUM mg/dL 8 1*     Results from last 7 days   Lab Units 22  0527   INR  2 56*        I Have Reviewed All Lab Data Listed Above  Invasive Devices     Peripheral Intravenous Line  Duration           Peripheral IV 22 Dorsal (posterior); Left Forearm 2 days          Drain  Duration External Urinary Catheter 7 days                     Recent Cultures (last 7 days):           Last 24 Hours Medication List:   Current Facility-Administered Medications   Medication Dose Route Frequency Provider Last Rate   • acetaminophen  650 mg Oral Q6H PRN Jhoan Evelia, DO     • ALPRAZolam  0 25 mg Oral Daily PRN Jhoan Evelia, DO     • aluminum-magnesium hydroxide-simethicone  30 mL Oral Q4H PRN Jhoan Evelia, DO     • amLODIPine  10 mg Oral Daily Jhoan Evelia, DO     • atorvastatin  40 mg Oral Daily With [address] [facility], DO     • buPROPion  150 mg Oral Daily [place] Evelia, DO     • clopidogrel  75 mg Oral Daily Jhoan Evelia, DO     • collagenase   Topical Daily Jhoan Evelia, DO     • doxazosin  4 mg Oral HS Jhoan Altamirano, DO     • doxycycline hyclate  100 mg Oral Q12H Sagrario Paul MD     • ferrous sulfate  325 mg Oral Daily With Lunch Calogero Reba, DO     • gabapentin  400 mg Oral TID Jhoan Altamirano, DO     • insulin glargine  10 Units Subcutaneous HS Jhoan Arangong, DO     • insulin lispro  1-6 Units Subcutaneous TID AC Jhoan Evelia, DO     • labetalol  10 mg Intravenous Q6H PRN Jhoan Evelia, DO     • lisinopril  40 mg Oral Daily Jhoan Evelia, DO     • methocarbamol  1,000 mg Oral [facility] Jhoan Evelia, DO     • metoclopramide  10 mg Intravenous Q6H PRN Jhoan Altamirano, DO     • metoprolol succinate  100 mg Oral Daily Eric Saab MD     • GINA ANTIFUNGAL   Topical BID Karen Garland MD     • naloxone  0 04 mg Intravenous Q1MIN PRN Jhoan Altamirano, DO     • ondansetron  4 mg Intravenous Q6H PRN Jhoan Evelia, DO     • oxyCODONE  10 mg Oral Q12H [address] Jhoan Evelia, DO     • oxyCODONE  2 5 mg Oral Q4H PRN Jhoan Altamirano, DO      Or   • oxyCODONE  5 mg Oral Q4H PRN Jhoan Evelia, DO      Or   • oxyCODONE  7 5 mg Oral Q4H PRN Jhoan Evelia, DO     • pantoprazole 40 mg Oral Early Morning Spero Gondola, DO     • polyethylene glycol  17 g Oral BID Spero Gondola, DO     • potassium chloride  20 mEq Oral Daily Mc Arrnigton MD     • QUEtiapine  25 mg Oral HS Spero Gondola, DO     • senna  1 tablet Oral BID Spero Gondola, DO     • sertraline  175 mg Oral Daily Spero Gondola, DO     • warfarin  3 mg Oral Daily (warfarin) Batsheva Perdue MD          Today, Patient Was Seen By: Batsheva Perdue MD

## 2022-12-30 NOTE — ASSESSMENT & PLAN NOTE
65year old female former smoker w/HTN, HLD, uncontrolled type II DM (A1c 9 8), hx CVA, presenting with nonhealing extensive L heel ulceration and R hallux and 5th digit ulceration  Vascular surgery consulted for input  S/p multiple b/l endovascular interventions  JUSTIN  R hemispheric CVA    S/p L AKA 11/23/22 [facility])  S/p R AKA, wound debridement 12/1/22 [facility])  S/p R AKA wound/stump washout and VAC placement 12/16/22 Yanira Jones)  S/p R AKA wound/stump VAC change, L AKA stump washout/packing 12/19/22 (Celestino)    Wound Cx: enterobacter cloacae     Recommendations:  - Bilateral tissue loss in the setting of uncontrolled type II DM and NYDIA on admission, now s/p multiple angiograms w/intervention    - Endovascular interventions, c/b atheroembolic event to the RLE and JUSTIN with R hemispheric CVA  - Now s/p L AKA on 11/23/22 and R AKA w/ proximal thigh wound debridements 12/1/22    - R AKA stump with incisional dehiscence and thigh wounds requiring debridement  Taken to the OR on 12/16 for washout and VAC placement with return to the OR 12/19 for VAC change  - L AKA stump incision dehiscence now closed  Continue local wound care  - Appreciate ID for assistance with abx- L stump cultures growing out enterobacter cloacae  Patient stable on current plan per ID- doxycycline through 1/2/23  - Right AKA wound VAC changed today at bedside  Continue wound VAC therapy  Continuous suction 125 mmHg  change MWF-next change Monday   May be able to discontinue on Monday  - Incentive spirometer   - Medical management with statin, Plavix  - Continue coumadin  - Medical management and glucose control per SLIM  - Continue oral pain regimen, adjusted to long acting which appears to be controlling patient's pain well   - Appreciate psych input and management - addition of wellbutrin and adjustment of zoloft   - Disposition planning for rehab: will follow up with CM, currently pending STR acceptance

## 2022-12-30 NOTE — ASSESSMENT & PLAN NOTE
65year old female former smoker w/HTN, HLD, uncontrolled type II DM (A1c 9 8), hx CVA, presenting with nonhealing extensive L heel ulceration and R hallux and 5th digit ulceration  Vascular surgery consulted for input  S/p multiple b/l endovascular interventions  JUSTIN  R hemispheric CVA    S/p L AKA 11/23/22 [facility])  S/p R AKA, wound debridement 12/1/22 [facility])  S/p R AKA wound/stump washout and VAC placement 12/16/22 Jayme Hunger)  S/p R AKA wound/stump VAC change, L AKA stump washout/packing 12/19/22 (Celestino)    Wound Cx: enterobacter cloacae     Recommendations:  - Bilateral tissue loss in the setting of uncontrolled type II DM and NYDIA on admission, now s/p multiple angiograms w/intervention    - Endovascular interventions, c/b atheroembolic event to the RLE and JUSTIN with R hemispheric CVA  - Now s/p L AKA on 11/23/22 and R AKA w/ proximal thigh wound debridements 12/1/22    - R AKA stump with incisional dehiscence and thigh wounds requiring debridement  Taken to the OR on 12/16 for washout and VAC placement with return to the OR 12/19 for VAC change  - L AKA stump incision dehiscence now closed  Continue local wound care  - Appreciate ID for assistance with abx- L stump cultures growing out enterobacter cloacae  Patient stable on current plan per ID- doxycycline through 1/2/23  - Right AKA wound VAC changed today at bedside  Continue wound VAC therapy  Continuous suction 125 mmHg  change MWF-next change Monday   May be able to discontinue on Monday  - Incentive spirometer   - Medical management with statin, Plavix  - Continue coumadin  - Medical management and glucose control per SLIM  - Continue oral pain regimen, adjusted to long acting which appears to be controlling patient's pain well   - Appreciate psych input and management - addition of wellbutrin and adjustment of zoloft   - Disposition planning for rehab: will follow up with CM   - D/w Dr Esteban Li

## 2022-12-30 NOTE — ASSESSMENT & PLAN NOTE
Lab Results   Component Value Date    HGBA1C 9 8 (H) 10/25/2022     Recent Labs     12/29/22  1113 12/29/22  1614 12/29/22  2106 12/30/22  0757   POCGLU 161* 139 165* 112     · Significantly reduced regimen from home due to poor oral intake  · Goal sugar < 180  · Continue Lantus to 10 units at night  · Continue sliding scale insulin

## 2022-12-30 NOTE — PROGRESS NOTES
[address] [facility]  Progress Note - [facility] [address] 1960, 65 y o  female MRN: [id number]  Unit/Bed#: E4 -01 Encounter: [id number]  Primary Care Provider: ALEX Sims   Date and time admitted to hospital: 10/21/2022  7:58 PM    * PAD (peripheral artery disease) [facility])  Assessment & Plan  65year old female former smoker w/HTN, HLD, uncontrolled type II DM (A1c 9 8), hx CVA, presenting with nonhealing extensive L heel ulceration and R hallux and 5th digit ulceration  Vascular surgery consulted for input  S/p multiple b/l endovascular interventions  JUSTIN  R hemispheric CVA    S/p L AKA 11/23/22 [facility])  S/p R AKA, wound debridement 12/1/22 [facility])  S/p R AKA wound/stump washout and VAC placement 12/16/22 [facility])  S/p R AKA wound/stump VAC change, L AKA stump washout/packing 12/19/22 ([facility])    Wound Cx: enterobacter cloacae     Recommendations:  - Bilateral tissue loss in the setting of uncontrolled type II DM and NYDIA on admission, now s/p multiple angiograms w/intervention    - Endovascular interventions, c/b atheroembolic event to the RLE and JUSTIN with R hemispheric CVA  - Now s/p L AKA on 11/23/22 and R AKA w/ proximal thigh wound debridements 12/1/22    - R AKA stump with incisional dehiscence and thigh wounds requiring debridement  Taken to the OR on 12/16 for washout and VAC placement with return to the OR 12/19 for VAC change  - L AKA stump incision dehiscence now closed  Continue local wound care  - Appreciate ID for assistance with abx- L stump cultures growing out enterobacter cloacae  Patient stable on current plan per ID- doxycycline through 1/2/23  - Right AKA wound VAC changed today at bedside  Continue wound VAC therapy  Continuous suction 125 mmHg  change MWF-next change Monday   May be able to discontinue on Monday  - Incentive spirometer   - Medical management with statin, Plavix  - Continue coumadin  - Medical management and glucose control per SLIM  - Continue oral pain regimen, adjusted to long acting which appears to be controlling patient's pain well   - Appreciate psych input and management - addition of wellbutrin and adjustment of zoloft   - Disposition planning for rehab: will follow up with CM   - D/w Dr Samaniego Aid: Patient offers no complaints  Wound VAC changed at bedside without complication  Vitals:  /74 (BP Location: Left arm)   Pulse 74   Temp 98 2 °F (36 8 °C) (Temporal)   Resp 20   Ht 5' 4" (1 626 m)   Wt 68 5 kg (151 lb)   SpO2 94%   BMI 25 92 kg/m²     I/Os:  I/O last 3 completed shifts: In: 200 [P O :200]  Out: 400 [Urine:400]  No intake/output data recorded  Lab Results and Cultures:   CBC with diff: Lab Results   Component Value Date    WBC 8 29 12/27/2022    HGB 7 9 (L) 12/27/2022    HCT 27 0 (L) 12/27/2022    MCV 87 12/27/2022     12/27/2022    MCH 25 3 (L) 12/27/2022    MCHC 29 3 (L) 12/27/2022    RDW 16 4 (H) 12/27/2022    MPV 8 4 (L) 12/27/2022    NRBC 0 12/27/2022   ,   BMP/CMP:  Lab Results   Component Value Date    SODIUM 141 12/24/2022    K 3 6 12/24/2022     12/24/2022    CO2 29 12/24/2022    BUN 8 12/24/2022    CREATININE 0 65 12/24/2022    CALCIUM 8 1 (L) 12/24/2022    AST 27 12/22/2022    ALT 13 12/22/2022    ALKPHOS 167 (H) 12/22/2022    EGFR 96 12/24/2022   ,   Lipid Panel: No results found for: CHOL,   Coags:   Lab Results   Component Value Date    PTT 95 (H) 12/12/2022    INR 2 56 (H) 12/30/2022   ,     Blood Culture:   Lab Results   Component Value Date    BLOODCX No Growth After 5 Days   11/13/2022    BLOODCX Staphylococcus coagulase negative (A) 11/13/2022   ,   Urinalysis: Lab Results   Component Value Date    COLORU Yellow 11/12/2022    CLARITYU Slightly Cloudy 11/12/2022    SPECGRAV 1 025 11/12/2022    PHUR 5 5 11/12/2022    PHUR 6 5 02/23/2018    LEUKOCYTESUR Trace (A) 11/12/2022    NITRITE Positive (A) 11/12/2022    GLUCOSEU Negative 11/12/2022    KETONESU Negative 11/12/2022    BILIRUBINUR Negative 11/12/2022    BLOODU Large (A) 11/12/2022   ,   Urine Culture:   Lab Results   Component Value Date    URINECX >100,000 cfu/ml Enterobacter cloacae complex (A) 11/12/2022    URINECX 70,000-79,000 cfu/ml Kluyveromyces marxianus (A) 11/12/2022   ,   Wound Culure: No results found for: WOUNDCULT    Medications:  Current Facility-Administered Medications   Medication Dose Route Frequency   • acetaminophen (TYLENOL) tablet 650 mg  650 mg Oral Q6H PRN   • ALPRAZolam (XANAX) tablet 0 25 mg  0 25 mg Oral Daily PRN   • aluminum-magnesium hydroxide-simethicone (MYLANTA) oral suspension 30 mL  30 mL Oral Q4H PRN   • amLODIPine (NORVASC) tablet 10 mg  10 mg Oral Daily   • atorvastatin (LIPITOR) tablet 40 mg  40 mg Oral Daily With Dinner   • buPROPion (WELLBUTRIN XL) 24 hr tablet 150 mg  150 mg Oral Daily   • clopidogrel (PLAVIX) tablet 75 mg  75 mg Oral Daily   • collagenase (SANTYL) ointment   Topical Daily   • doxazosin (CARDURA) tablet 4 mg  4 mg Oral HS   • doxycycline hyclate (VIBRAMYCIN) capsule 100 mg  100 mg Oral Q12H [facility]   • ferrous sulfate tablet 325 mg  325 mg Oral Daily With Lunch   • gabapentin (NEURONTIN) capsule 400 mg  400 mg Oral TID   • insulin glargine (LANTUS) subcutaneous injection 10 Units 0 1 mL  10 Units Subcutaneous HS   • insulin lispro (HumaLOG) 100 units/mL subcutaneous injection 1-6 Units  1-6 Units Subcutaneous TID AC   • labetalol (NORMODYNE) injection 10 mg  10 mg Intravenous Q6H PRN   • lisinopril (ZESTRIL) tablet 40 mg  40 mg Oral Daily   • methocarbamol (ROBAXIN) tablet 1,000 mg  1,000 mg Oral Q8H [facility]   • metoclopramide (REGLAN) injection 10 mg  10 mg Intravenous Q6H PRN   • metoprolol succinate (TOPROL-XL) 24 hr tablet 100 mg  100 mg Oral Daily   • naloxone (NARCAN) 0 04 mg/mL syringe 0 04 mg  0 04 mg Intravenous Q1MIN PRN   • nystatin (MYCOSTATIN) powder   Topical BID   • ondansetron (ZOFRAN) injection 4 mg  4 mg Intravenous Q6H PRN   • oxyCODONE (OxyCONTIN) 12 hr tablet 10 mg  10 mg Oral Q12H [address]   • oxyCODONE (ROXICODONE) IR tablet 2 5 mg  2 5 mg Oral Q4H PRN    Or   • oxyCODONE (ROXICODONE) IR tablet 5 mg  5 mg Oral Q4H PRN    Or   • oxyCODONE (ROXICODONE) IR tablet 7 5 mg  7 5 mg Oral Q4H PRN   • pantoprazole (PROTONIX) EC tablet 40 mg  40 mg Oral Early Morning   • polyethylene glycol (MIRALAX) packet 17 g  17 g Oral BID   • potassium chloride (K-DUR,KLOR-CON) CR tablet 20 mEq  20 mEq Oral Daily   • QUEtiapine (SEROquel) tablet 25 mg  25 mg Oral HS   • senna (SENOKOT) tablet 8 6 mg  1 tablet Oral BID   • sertraline (ZOLOFT) tablet 175 mg  175 mg Oral Daily   • warfarin (COUMADIN) tablet 3 mg  3 mg Oral Daily (warfarin)       Imaging:  Reviewed  Physical Exam:    General: Alert and oriented    Extremities: S/p bilateral AKA    Kin Martell PA-C  12/30/2022

## 2022-12-30 NOTE — OCCUPATIONAL THERAPY NOTE
Occupational Therapy Progress Note     Patient Name: Tawanda CASTORENAM'U Date: 12/30/2022  Problem List  Principal Problem:    PAD (peripheral artery disease) ([facility] )  Active Problems:    Depression, recurrent ([facility] )    Benign essential hypertension    Stroke ([facility] )    Type 2 diabetes mellitus with hyperglycemia, with long-term current use of insulin (HCC)    Acute on chronic anemia    Diabetic ulcer of heel associated with diabetes mellitus due to underlying condition (HCC)    HIT (heparin-induced thrombocytopenia)    Febrile       12/30/22 1420   OT Last Visit   OT Visit Date 12/30/22   Note Type   Note Type Treatment   Pain Assessment   Pain Assessment Tool 0-10   Pain Score 5   Pain Location/Orientation Orientation: Right;Location: Leg   Pain Onset/Description Onset: Ongoing   Patient's Stated Pain Goal No pain   Hospital Pain Intervention(s) Repositioned; Ambulation/increased activity; Emotional support; Rest   Restrictions/Precautions   Weight Bearing Precautions Per Order Yes   RLE Weight Bearing Per Order NWB   LLE Weight Bearing Per Order NWB   Braces or Orthoses Other (Comment)  (B/L BKA)   Other Precautions Bed Alarm;Multiple lines; Fall Risk;Pain  (R LE wound vac)   ADL   Toileting Deficit Perineal hygiene; Other (Comment)  (BM hygiene)   Toileting Comments Mod to cleanse nasreen area the best she could semi recumbent in bed  Incontinent of BM  Total assist to clean backside and change pads  Open areas on buttock  Wound care aware and will come to assess  Pt rolled several times to assist in cleaning  Functional Standing Tolerance   Time 20 mins   Activity Sat EOB x 20 mins w/ mod/max for balance this day  [place] like she couldn't get positioned right on EOB  Fearful of falling  R sided lean w/ cues to correct  Fatigued  Bed Mobility   Rolling R 3  Moderate assistance   Additional items Assist x 1; Increased time required;Verbal cues; Bedrails   Rolling L 4  Minimal assistance   Additional items Assist x 1;Increased time required;Verbal cues; Bedrails   Supine to Sit 2  Maximal assistance   Additional items Assist x 2; Increased time required;Verbal cues;LE management; Bedrails;HOB elevated  (trunk management)   Sit to Supine 3  Moderate assistance   Additional items Assist x 1; Increased time required;LE management   Additional Comments Pt w/ difficulties to roll to R side compared to L side  Improvement noted in ability to roll to R this day  Reports "it's been getting easier " Remains rigid and fearful when sitting upright on EOB  However, is able to lay trunk down on bed to return to supine  Mod assist needed for lower half  Assists in scooting self up to [facility] using rails  Assist x 2 to fully reposition and place on L side lying at end of session  Alarm engaged  Transfers   Additional Comments Supine to long sit using lower bed rails x 5 times  Amount of assist fluctuated; generally Mod x 2  Pt able to hold each sit x 1 min  Fatigued toward the last 2 reps  Able to achieve full upright seated position  States " wow that's a good arm work out "   Functional Mobility   Additional Comments Continue to recommend andree TURNER  Subjective   Subjective " You can come back anytime to work with me "   Cognition   Overall Cognitive Status [facility]   Arousal/Participation Alert; Cooperative   Attention Within functional limits   Orientation Level Oriented X4   Memory Decreased short term memory   Following Commands Follows multistep commands without difficulty   Comments Good participation and effort  Hindered by fear of falling  Motivated to get better and eventually get home  Additional Activities   Additional Activities Comments Encouraged self rolling to weight shift and relieve pressure on back  Also encourages UE strengthening and improved activity tolerance     Activity Tolerance   Activity Tolerance Patient limited by fatigue;Patient limited by pain   Medical Staff Made Aware RNFrancisco   Assessment   Assessment Pt seen for OT tx session w/ focus on self care, bed mobility, UE exercises, and activity tolerance  Endorses 5/10  R LE pain due to wound vac change earlier  Improved rolling this day, especially R side, which is her more challenging side  Remains Max x 2 for supine to sit due to fear of falling and core weakness  Sat EOB w/ Mod/Max assist  Reports couldn't find her balance this day  Pt laid trunk down on bed and needed Mod for LB  Performed supine to long sit using lower bed rails 5x w/ Mod x 2 and was able to self hold for 1 min each  Mod for nasreen care in long sit and total assist for BM hygiene due to incontinence  Pt remains motivated and provides good effort during session  Hindered by pain, fear, and weakness  Recommendation for rehab remains appropriate to achieve optimal performance and decrease caregiver burden  Would benefit from OOB using andree for overall wellness and activity tolerance  Plan   Treatment Interventions ADL retraining;Functional transfer training;UE strengthening/ROM; Endurance training;Patient/family training;Equipment evaluation/education; Activityengagement   Goal Expiration Date 01/06/23   OT Treatment Day 18   OT Frequency 3-5x/wk   Recommendation   OT Discharge Recommendation Post acute rehabilitation services   AM-PAC Daily Activity Inpatient   Lower Body Dressing 2   Bathing 2   Toileting 1   Upper Body Dressing 2   Grooming 3   Eating 3   Daily Activity Raw Score 13   Daily Activity Standardized Score (Calc for Raw Score >=11) 32 03   AM-PAC Applied Cognition Inpatient   Following a Speech/Presentation 4   Understanding Ordinary Conversation 4   Taking Medications 3   Remembering Where Things Are Placed or Put Away 3   Remembering List of 4-5 Errands 3   Taking Care of Complicated Tasks 2   Applied Cognition Raw Score 19   Applied Cognition Standardized Score 39 77        12/30/22 1420   OT Last Visit   OT Visit Date 12/30/22   Note Type   Note Type Treatment   Pain Assessment   Pain Assessment Tool 0-10   Pain Score 5   Pain Location/Orientation Orientation: Right;Location: Leg   Pain Onset/Description Onset: Ongoing   Patient's Stated Pain Goal No pain   Hospital Pain Intervention(s) Repositioned; Ambulation/increased activity; Emotional support; Rest   Restrictions/Precautions   Weight Bearing Precautions Per Order Yes   RLE Weight Bearing Per Order NWB   LLE Weight Bearing Per Order NWB   Braces or Orthoses Other (Comment)  (B/L BKA)   Other Precautions Bed Alarm;Multiple lines; Fall Risk;Pain  (R LE wound vac)   ADL   Toileting Deficit Perineal hygiene; Other (Comment)  (BM hygiene)   Toileting Comments Mod to cleanse nasreen area the best she could semi recumbent in bed  Incontinent of BM  Total assist to clean backside and change pads  Open areas on buttock  Wound care aware and will come to assess  Pt rolled several times to assist in cleaning  Functional Standing Tolerance   Time 20 mins   Activity Sat EOB x 20 mins w/ mod/max for balance this day  [place] like she couldn't get positioned right on EOB  Fearful of falling  R sided lean w/ cues to correct  Fatigued  Bed Mobility   Rolling R 3  Moderate assistance   Additional items Assist x 1; Increased time required;Verbal cues; Bedrails   Rolling L 4  Minimal assistance   Additional items Assist x 1; Increased time required;Verbal cues; Bedrails   Supine to Sit 2  Maximal assistance   Additional items Assist x 2; Increased time required;Verbal cues;LE management; Bedrails;HOB elevated  (trunk management)   Sit to Supine 3  Moderate assistance   Additional items Assist x 1; Increased time required;LE management   Additional Comments Pt w/ difficulties to roll to R side compared to L side  Improvement noted in ability to roll to R this day  Reports "it's been getting easier " Remains rigid and fearful when sitting upright on EOB  However, is able to lay trunk down on bed to return to supine  Mod assist needed for lower half   Assists in scooting self up to [facility] using rails  Assist x 2 to fully reposition and place on L side lying at end of session  Alarm engaged  Transfers   Additional Comments Supine to long sit using lower bed rails x 5 times  Amount of assist fluctuated; generally Mod x 2  Pt able to hold each sit x 1 min  Fatigued toward the last 2 reps  Able to achieve full upright seated position  States " wow that's a good arm work out "   Functional Mobility   Additional Comments Continue to recommend andree TURNER  Subjective   Subjective " You can come back anytime to work with me "   Cognition   Overall Cognitive Status [facility]   Arousal/Participation Alert; Cooperative   Attention Within functional limits   Orientation Level Oriented X4   Memory Decreased short term memory   Following Commands Follows multistep commands without difficulty   Comments Good participation and effort  Hindered by fear of falling  Motivated to get better and eventually get home  Additional Activities   Additional Activities Comments Encouraged self rolling to weight shift and relieve pressure on back  Also encourages UE strengthening and improved activity tolerance  Activity Tolerance   Activity Tolerance Patient limited by fatigue;Patient limited by pain   Medical Staff Made Aware RNJosh   Assessment   Assessment Pt seen for OT tx session w/ focus on self care, bed mobility, UE exercises, and activity tolerance  Endorses 5/10  R LE pain due to wound vac change earlier  Improved rolling this day, especially R side, which is her more challenging side  Remains Max x 2 for supine to sit due to fear of falling and core weakness  Sat EOB w/ Mod/Max assist  Reports couldn't find her balance this day  Pt laid trunk down on bed and needed Mod for LB  Performed supine to long sit using lower bed rails 5x w/ Mod x 2 and was able to self hold for 1 min each  Mod for nasreen care in long sit and total assist for BM hygiene due to incontinence   Pt remains motivated and provides good effort during session  Hindered by pain, fear, and weakness  Recommendation for rehab remains appropriate to achieve optimal performance and decrease caregiver burden  Would benefit from OOB using andree for overall wellness and activity tolerance  Plan   Treatment Interventions ADL retraining;Functional transfer training;UE strengthening/ROM; Endurance training;Patient/family training;Equipment evaluation/education; Activityengagement   Goal Expiration Date 01/06/23   OT Treatment Day 18   OT Frequency 3-5x/wk   Recommendation   OT Discharge Recommendation Post acute rehabilitation services   AM-PAC Daily Activity Inpatient   Lower Body Dressing 2   Bathing 2   Toileting 1   Upper Body Dressing 2   Grooming 3   Eating 3   Daily Activity Raw Score 13   Daily Activity Standardized Score (Calc for Raw Score >=11) 32 03   AM-PAC Applied Cognition Inpatient   Following a Speech/Presentation 4   Understanding Ordinary Conversation 4   Taking Medications 3   Remembering Where Things Are Placed or Put Away 3   Remembering List of 4-5 Errands 3   Taking Care of Complicated Tasks 2   Applied Cognition Raw Score 19   Applied Cognition Standardized Score 39 77     Ruthann Arboleda MS, OTR/L

## 2022-12-30 NOTE — WOUND OSTOMY CARE
Consult Note - Wound   Rebeka Hartley 65 y o  female MRN: [id number]  Unit/Bed#: E4 -01 Encounter: [id number]      Patient seen at the request of nursing leadership team with concern for possible new pressure injury to buttocks  Assessment Findings:   Patient agreeable to assessment, assessed along with primary RN  Requires assist x 2 to turn in bed  On low air-loss mattress with positioning wedges in use  Occasionally incontinent of urine with purewick in place for management  Incontinent of pasty stool  Lower extremity VAC dressings intact per surgery team   Abdominal and breast folds intact  1   Wound to left perineum--pink, linear wound with entirely blanchable wound bed  Possibly traumatic wound related to purewick placement or removal   No drainage at this time  2   Wound to left medial nasreen-rectal area--unclear etiology  Very small, round, "dot" wound  Red, with scant serosanguinous drainage  Patient reports it is sore when nasreen-anal care provided  Wound is not in a typical pressure area and does not match pattern of purewick  It is likely a satellite lesion which was abraded wiping with  nasreen-anal care  Nasreen-wound pink and blanchable  3   Candidiasis to groin folds, perineum, and along gluteal cleft/sacrum--pink, blanchable with satellite lesions  See flowsheet for wound details  Discussed with patient--recommended removing the purewick to help with perineal wound healing  Patient insistent that she cannot use a bed pan and cannot get to commode at this time  However, she is agreeable to only using purewick at night  Wound Care Plan:   1-P500 low air-loss mattress  2-Lower extremity wounds per surgery team   3-Moisturize skin daily with lotion  4-Turn/reposition every 2 hours while in bed using positioning wedges; and weight shift frequently while in chair for pressure re-distribution on skin     5-Offloading air cushion if/when out of bed to chair   6-Sacrum, buttocks, perineum, groin folds--cleanse with soap and water, pat dry  Apply Mac cream twice daily and as needed with incontinence care  Wound care team to follow  Plan of care reviewed with primary RN  Wound 12/29/22 Buttocks Medial;Distal;Left (Active)   Wound Image   12/30/22 1507   Wound Description Beefy red 12/30/22 1507   Michaela-wound Assessment [place] 12/30/22 1507   Wound Length (cm) 0 2 cm 12/30/22 1507   Wound Width (cm) 0 2 cm 12/30/22 1507   Wound Depth (cm) 0 1 cm 12/30/22 1507   Wound Surface Area (cm^2) 0 04 cm^2 12/30/22 1507   Wound Volume (cm^3) 0 004 cm^3 12/30/22 1507   Calculated Wound Volume (cm^3) 0 cm^3 12/30/22 1507   Drainage Amount Scant 12/30/22 1507   Drainage Description Serosanguineous 12/30/22 1507   Non-staged Wound Description Partial thickness 12/30/22 1507   Treatments Cleansed 12/30/22 1507   Dressing Protective barrier 12/30/22 1507   Patient Tolerance Tolerated well 12/30/22 1507       Wound 12/30/22 Perineum Left (Active)   Wound Image   12/30/22 1514   Wound Description Pink 12/30/22 1514   Michaela-wound Assessment Pink; Intact 12/30/22 1514   Wound Length (cm) 1 5 cm 12/30/22 1514   Wound Width (cm) 0 3 cm 12/30/22 1514   Wound Depth (cm) 0 1 cm 12/30/22 1514   Wound Surface Area (cm^2) 0 45 cm^2 12/30/22 1514   Wound Volume (cm^3) 0 045 cm^3 12/30/22 1514   Calculated Wound Volume (cm^3) 0 05 cm^3 12/30/22 1514   Drainage Amount None 12/30/22 1514   Non-staged Wound Description Partial thickness 12/30/22 1514   Treatments Cleansed 12/30/22 1514   Dressing Protective barrier 12/30/22 1514   Patient Tolerance Tolerated well 12/30/22 [id number] [facility] BSN, RN, [employer]

## 2022-12-30 NOTE — PLAN OF CARE
Problem: OCCUPATIONAL THERAPY ADULT  Goal: Performs self-care activities at highest level of function for planned discharge setting  See evaluation for individualized goals  Description: Treatment Interventions: ADL retraining, Functional transfer training, UE strengthening/ROM, Endurance training, Cognitive reorientation, Patient/family training, Equipment evaluation/education, Compensatory technique education, Energy conservation, Activityengagement          See flowsheet documentation for full assessment, interventions and recommendations  Outcome: Progressing  Note: Limitation: Decreased ADL status, Decreased UE strength, Decreased endurance, Decreased high-level ADLs (New L visual field deficits)  Prognosis: Good  Assessment: Pt seen for OT tx session w/ focus on self care, bed mobility, UE exercises, and activity tolerance  Endorses 5/10  R LE pain due to wound vac change earlier  Improved rolling this day, especially R side, which is her more challenging side  Remains Max x 2 for supine to sit due to fear of falling and core weakness  Sat EOB w/ Mod/Max assist  Reports couldn't find her balance this day  Pt laid trunk down on bed and needed Mod for LB  Performed supine to long sit using lower bed rails 5x w/ Mod x 2 and was able to self hold for 1 min each  Mod for nasreen care in long sit and total assist for BM hygiene due to incontinence  Pt remains motivated and provides good effort during session  Hindered by pain, fear, and weakness  Recommendation for rehab remains appropriate to achieve optimal performance and decrease caregiver burden  Would benefit from OOB using andree for overall wellness and activity tolerance       OT Discharge Recommendation: Post acute rehabilitation services

## 2022-12-30 NOTE — PLAN OF CARE
Problem: PHYSICAL THERAPY ADULT  Goal: Performs mobility at highest level of function for planned discharge setting  See evaluation for individualized goals  Description: Treatment/Interventions: Functional transfer training, LE strengthening/ROM, Therapeutic exercise, Endurance training, Patient/family training, Equipment eval/education, Bed mobility, Compensatory technique education, Gait training, Continued evaluation, Spoke to nursing, Spoke to MD, Spoke to case management, OT          See flowsheet documentation for full assessment, interventions and recommendations  Outcome: Progressing  Note: Prognosis: Fair  Problem List: Decreased strength, Decreased endurance, Impaired balance, Decreased mobility, Obesity, Decreased skin integrity, Pain  Assessment: Pt seen for PT treatment session this date with interventions consisting of therapeutic activity consisting of training: bed mobility, supine<>sit transfers and multiple trials of rolling in bed for hygiene and positioning, 5 reps supine to long sit in bed with use of bedsheet and B bed rail  Pt agreeable to PT treatment session upon arrival, pt found supine in bed w/ HOB elevated, in no apparent distress and responsive  In comparison to previous session, pt with improvements in postural support  Post session: pt returned BTB, bed alarm engaged, all needs in reach and RN notified of session findings/recommendations  Continue to recommend post acute rehabilitation services at time of d/c in order to maximize pt's functional independence and safety w/ mobility  Pt continues to be functioning below baseline level, and remains limited 2* factors listed above and including pt requires extensive Ax2 for functional mobility  PT will continue to see pt during current hospitalization in order to address the deficits listed above and provide interventions consistent w/ POC in effort to achieve STGs    Barriers to Discharge: Inaccessible home environment, Decreased caregiver support  Barriers to Discharge Comments: steps, lives alone  PT Discharge Recommendation: Post acute rehabilitation services    See flowsheet documentation for full assessment

## 2022-12-30 NOTE — PROGRESS NOTES
Progress Note - [facility] Infectious Disease   [address] 65 y o  female MRN: [id number]  Unit/Bed#: E4 -01 Encounter: [id number]      IMPRESSION & RECOMMENDATIONS:   1  Left AKA stump site abscess  Patient is s/p OR on 12/19/2022 with abscess cavity found  Purulent drainage sent for culture, growing Enterobacter cloacae  Previously discussed with vascular team, no current concern for bone infection but rather soft tissue only   Patient has been transitioned to Doxycycline which she is tolerating without difficulty  -continue PO Doxycycline through 1/2/2023 for 10 days total antibiotic treatment  -serial stump exams  -local wound care/VAC care per vascular surgery  -continue follow up with vascular surgery     2  Recent fever  Occurring on 12/19/2022, though appears to correlate post op from her right AKA wound VAC change and left AKA stump site I&D  Suspect reactive to surgery and possibly to the infection of left AKA site  Fever curve has trended down and WBC count remains normal    -antibiotic as above  -monitor CBCD  -monitor vitals     3  Severe PAD with multiple B/L LE atheroembolic events and blistering wounds  Patient initially presented with nonhealing extensive L heel ulceration and R hallux and 5th digit ulceration  Hospital stay complicated by multiple bilateral endovascular interventions  S/p L AKA 11/23 and R AKA 12/1/22 with proximal thigh wound debridements  R stump wound vac in place    -continue follow up with vascular surgery  -daily wound care and dressing changes      4  Coag negative staph bacteremia  Staph epidermidis present in one set of blood cultures drawn on 11/13/2022  Suspect this is a contaminant and not representative of active bacteremia  Patient has had TTE during neurology stroke work up and there was no evidence of valvular vegetation/endocarditis  No indication for repeat blood cultures or additional work up at this time      5  Asymptomatic bacteriuria   Urine culture added onto previous nephrology UA collected on 2022 after patient developed fever on 2022  There was growth of enterobacter  Low suspicion for acute UTI  Jacinto was exchanged      6  R hemispheric CVA  CT head imaging revealing acute R hemispheric stroke on 2022 after patient experienced change in mental status  Neurology was following  Stroke likely embolic in nature       7  Type 2 diabetes mellitus with long term insulin use  HbA1c was 9 8%  Elevated blood glucose is risk factor for wounds and infection  Recommend tight glycemic control  Antibiotics:  PO doxy     Infectious disease consultation service will sign off for now  Please call if any new signs or symptoms of infection develop  Thank you! I have discussed the above management plan in detail with patient  I have discussed the above management plan in detail with patient's RN, and the primary service, vascular surgery  Subjective:  Patient sleeping  Has no new complaints  Right stump wound vac to be changed today  Objective:  Vitals:  Temp:  [98 2 °F (36 8 °C)-98 9 °F (37 2 °C)] 98 2 °F (36 8 °C)  HR:  [74-83] 74  Resp:  [20] 20  BP: (145-172)/(69-76) 145/74  SpO2:  [94 %-95 %] 94 %  Temp (24hrs), Av 6 °F (37 °C), Min:98 2 °F (36 8 °C), Max:98 9 °F (37 2 °C)  Current: Temperature: 98 2 °F (36 8 °C)    PHYSICAL EXAM:  General Appearance:  Appearing well, nontoxic, and in no distress, sleeping but easily rouses to verbal stimuli    HEENT: Normocephalic, without obvious abnormality, atraumatic  Conjunctiva pink and sclera anicteric  Oropharynx moist without lesions  Lungs:   Clear to auscultation bilaterally, respirations unlabored   Heart:  RRR; no murmur, rub or gallop   Abdomen:   Soft, non-tender, obese, non-distended, positive bowel sounds    Extremities: No cyanosis, clubbing or edema  L BKA and R AKA  Musculoskeletal: Back symmetric without curvature, ROM normal     Skin: No rashes or lesions   L BKA dressing over stump site staples intact  R stump with vac intact  No spreading erythema  Peripheral IV intact without evidence of erythema, warmth, or exudate  LABS, IMAGING, & OTHER STUDIES:  Lab Results:  I have personally reviewed pertinent labs    Results from last 7 days   Lab Units 12/27/22  0455 12/24/22  0459   WBC Thousand/uL 8 29 4 97   HEMOGLOBIN g/dL 7 9* 7 9*   PLATELETS Thousands/uL 370 227     Results from last 7 days   Lab Units 12/24/22  0459   SODIUM mmol/L 141   POTASSIUM mmol/L 3 6   CHLORIDE mmol/L 104   CO2 mmol/L 29   BUN mg/dL 8   CREATININE mg/dL 0 65   EGFR ml/min/1 73sq m 96   CALCIUM mg/dL 8 1*

## 2022-12-30 NOTE — PLAN OF CARE
Problem: Potential for Falls  Goal: Patient will remain free of falls  Description: INTERVENTIONS:  - Educate patient/family on patient safety including physical limitations  - Instruct patient to call for assistance with activity   - Consult OT/PT to assist with strengthening/mobility   - Keep Call bell within reach  - Keep bed low and locked with side rails adjusted as appropriate  - Keep care items and personal belongings within reach  - Initiate and maintain comfort rounds  - Make Fall Risk Sign visible to staff  - Offer Toileting every 2 Hours, in advance of need  - Initiate/Maintain bed  chair alarm  - Obtain necessary fall risk management equipment: bed chair alarm, call bell, gripper sock, walker, bedside commode  - Apply yellow socks and bracelet for high fall risk patients  - Consider moving patient to room near nurses station  Outcome: Progressing     Problem: MOBILITY - ADULT  Goal: Maintain or return to baseline ADL function  Description: INTERVENTIONS:  -  Assess patient's ability to carry out ADLs; assess patient's baseline for ADL function and identify physical deficits which impact ability to perform ADLs (bathing, care of mouth/teeth, toileting, grooming, dressing, etc )  - Assess/evaluate cause of self-care deficits   - Assess range of motion  - Assess patient's mobility; develop plan if impaired  - Assess patient's need for assistive devices and provide as appropriate  - Encourage maximum independence but intervene and supervise when necessary  - Involve family in performance of ADLs  - Assess for home care needs following discharge   - Consider OT consult to assist with ADL evaluation and planning for discharge  - Provide patient education as appropriate  Outcome: Progressing  Goal: Maintains/Returns to pre admission functional level  Description: INTERVENTIONS:  - Perform BMAT or MOVE assessment daily    - Set and communicate daily mobility goal to care team and patient/family/caregiver     - Collaborate with rehabilitation services on mobility goals if consulted  - Assist patient in repositioning every 2 hours    - Dangle patient 3 times a day  - Stand patient 3 times a day  - Out of bed to chair as tolerated  - Out of bed for meals  - Out of bed for toileting  - Record patient progress and toleration of activity level   Outcome: Progressing     Problem: PAIN - ADULT  Goal: Verbalizes/displays adequate comfort level or baseline comfort level  Description: Interventions:  - Encourage patient to monitor pain and request assistance  - Assess pain using appropriate pain scale  - Administer analgesics based on type and severity of pain and evaluate response  - Implement non-pharmacological measures as appropriate and evaluate response  - Consider cultural and social influences on pain and pain management  - Notify physician/advanced practitioner if interventions unsuccessful or patient reports new pain  Outcome: Progressing     Problem: INFECTION - ADULT  Goal: Absence or prevention of progression during hospitalization  Description: INTERVENTIONS:  - Assess and monitor for signs and symptoms of infection  - Monitor lab/diagnostic results  - Monitor all insertion sites, i e  indwelling lines, tubes, and drains  - Administer medications as ordered  - Instruct and encourage patient and family to use good hand hygiene technique  Outcome: Progressing     Problem: SAFETY ADULT  Goal: Patient will remain free of falls  Description: INTERVENTIONS:  - Educate patient/family on patient safety including physical limitations  - Instruct patient to call for assistance with activity   - Consult OT/PT to assist with strengthening/mobility   - Keep Call bell within reach  - Keep bed low and locked with side rails adjusted as appropriate  - Keep care items and personal belongings within reach  - Initiate and maintain comfort rounds  - Make Fall Risk Sign visible to staff  - Offer Toileting every 2 Hours, in advance of need  - Initiate/Maintain bed  chair alarm  - Obtain necessary fall risk management equipment: bed chair alarm, call bell, gripper sock, walker, bedside commode  - Apply yellow socks and bracelet for high fall risk patients  - Consider moving patient to room near nurses station  Outcome: Progressing  Goal: Maintain or return to baseline ADL function  Description: INTERVENTIONS:  -  Assess patient's ability to carry out ADLs; assess patient's baseline for ADL function and identify physical deficits which impact ability to perform ADLs (bathing, care of mouth/teeth, toileting, grooming, dressing, etc )  - Assess/evaluate cause of self-care deficits   - Assess range of motion  - Assess patient's mobility; develop plan if impaired  - Assess patient's need for assistive devices and provide as appropriate  - Encourage maximum independence but intervene and supervise when necessary  - Involve family in performance of ADLs  - Assess for home care needs following discharge   - Consider OT consult to assist with ADL evaluation and planning for discharge  - Provide patient education as appropriate  Outcome: Progressing  Goal: Maintains/Returns to pre admission functional level  Description: INTERVENTIONS:  - Perform BMAT or MOVE assessment daily    - Set and communicate daily mobility goal to care team and patient/family/caregiver  - Collaborate with rehabilitation services on mobility goals if consulted  - Assist patient in repositioning every 2 hours    - Dangle patient 3 times a day  - Stand patient 3 times a day  - Out of bed to chair as tolerated  - Out of bed for meals  - Out of bed for toileting  - Record patient progress and toleration of activity level   Outcome: Progressing     Problem: DISCHARGE PLANNING  Goal: Discharge to home or other facility with appropriate resources  Description: INTERVENTIONS:  - Identify barriers to discharge w/patient   - Arrange for needed discharge resources and transportation as appropriate  - Identify discharge learning needs  - Refer to Case Management Department for coordinating discharge planning if the patient needs post-hospital services based on physician/advanced practitioner order   Outcome: Progressing     Problem: Knowledge Deficit  Goal: Patient/family/caregiver demonstrates understanding of disease process, treatment plan, medications, and discharge instructions  Description: Complete learning assessment and assess knowledge base    Interventions:  - Provide teaching at level of understanding  - Provide teaching via preferred learning methods  Outcome: Progressing     Problem: METABOLIC, FLUID AND ELECTROLYTES - ADULT  Goal: Electrolytes maintained within normal limits  Description: INTERVENTIONS:  - Monitor labs and assess patient for signs and symptoms of electrolyte imbalances  - Administer electrolyte replacement as ordered  - Monitor response to electrolyte replacements, including repeat lab results as appropriate  - Instruct patient on fluid and nutrition as appropriate  Outcome: Progressing  Goal: Fluid balance maintained  Description: INTERVENTIONS:  - Monitor labs   - Monitor I/O and WT  - Instruct patient on fluid and nutrition as appropriate  - Assess for signs & symptoms of volume excess or deficit  Outcome: Progressing  Goal: Glucose maintained within target range  Description: INTERVENTIONS:  - Monitor Blood Glucose as ordered  - Assess for signs and symptoms of hyperglycemia and hypoglycemia  - Administer ordered medications to maintain glucose within target range  - Assess nutritional intake and initiate nutrition service referral as needed  Outcome: Progressing     Problem: SKIN/TISSUE INTEGRITY - ADULT  Goal: Skin Integrity remains intact(Skin Breakdown Prevention)  Description: Assess:  -Perform Nicanor assessment every shift  -Clean and moisturize skin every shift  -Inspect skin when repositioning, toileting, and assisting with ADLS  -Assess under medical devices   -Assess extremities for adequate circulation and sensation     Bed Management:  -Have minimal linens on bed & keep smooth, unwrinkled  -Change linens as needed when moist or perspiring  -Avoid sitting or lying in one position for more than 2 hours while in bed    Toileting:  -Offer bedside commode  -Assess for incontinence every 2 hours  -Use incontinent care products after each incontinent episode     Activity:  -Mobilize patient 3 times a day  -Encourage or provide ROM exercises   -Turn and reposition patient every 2 Hours  -Use appropriate equipment to lift or move patient in bed  -Instruct/ Assist with weight shifting every 15 minutes when out of bed in chair  -Consider limitation of chair time 1 hour intervals    Skin Care:  -Avoid use of baby powder, tape, friction and shearing, hot water or constrictive clothing  -Relieve pressure over bony prominences using waffle cushion when in chair  -Do not massage red bony areas    Outcome: Progressing  Goal: Incision(s), wounds(s) or drain site(s) healing without S/S of infection  Description: INTERVENTIONS  - Assess and document dressing, incision, wound bed, drain sites and surrounding tissue  - Provide patient and family education  - Perform skin care/dressing changes everyday as ordered  Outcome: Progressing  Goal: Pressure injury heals and does not worsen  Description: Interventions:  - Implement low air loss mattress or specialty surface (Criteria met)  - Apply silicone foam dressing  - Instruct/assist with weight shifting every 15 minutes when in chair   - Limit chair time to 1 hour intervals  - Use special pressure reducing interventions such as waffle cushion when in chair   - Perform passive or active ROM   - Turn and reposition patient & offload bony prominences every 2 hours   - Utilize friction reducing device or surface for transfers   - Consider consults to  interdisciplinary teams such as wound care, PT/OT  - Use incontinent care products after each incontinent episode   - Consider nutrition services referral as needed  Outcome: Progressing     Problem: MUSCULOSKELETAL - ADULT  Goal: Maintain or return mobility to safest level of function  Description: INTERVENTIONS:  - Assess patient's ability to carry out ADLs; assess patient's baseline for ADL function and identify physical deficits which impact ability to perform ADLs (bathing, care of mouth/teeth, toileting, grooming, dressing, etc )  - Assess/evaluate cause of self-care deficits   - Assess range of motion  - Assess patient's mobility  - Assess patient's need for assistive devices and provide as appropriate  - Encourage maximum independence but intervene and supervise when necessary  - Involve family in performance of ADLs  - Assess for home care needs following discharge   - Consider OT consult to assist with ADL evaluation and planning for discharge  - Provide patient education as appropriate  Outcome: Progressing  Goal: Maintain proper alignment of affected body part  Description: INTERVENTIONS:  - Support, maintain and protect limb and body alignment  - Provide patient/ family with appropriate education  Outcome: Progressing     Problem: CARDIOVASCULAR - ADULT  Goal: Maintains optimal cardiac output and hemodynamic stability  Description: INTERVENTIONS:  - Monitor I/O, vital signs and rhythm  - Monitor for S/S and trends of decreased cardiac output  - Administer and titrate ordered vasoactive medications to optimize hemodynamic stability  - Assess quality of pulses, skin color and temperature  - Assess for signs of decreased coronary artery perfusion  - Instruct patient to report change in severity of symptoms  Outcome: Progressing     Problem: RESPIRATORY - ADULT  Goal: Achieves optimal ventilation and oxygenation  Description: INTERVENTIONS:  - Assess for changes in respiratory status  - Assess for changes in mentation and behavior  - Position to facilitate oxygenation and minimize respiratory effort  - Oxygen administered by appropriate delivery if ordered  - Initiate smoking cessation education as indicated  - Encourage broncho-pulmonary hygiene including cough, deep breathe, Incentive Spirometry  - Assess the need for suctioning and aspirate as needed  - Assess and instruct to report SOB or any respiratory difficulty  - Respiratory Therapy support as indicated  Outcome: Progressing     Problem: Prexisting or High Potential for Compromised Skin Integrity  Goal: Skin integrity is maintained or improved  Description: INTERVENTIONS:  - Identify patients at risk for skin breakdown  - Assess and monitor skin integrity  - Assess and monitor nutrition and hydration status  - Monitor labs   - Assess for incontinence   - Turn and reposition patient  - Assist with mobility/ambulation  - Relieve pressure over bony prominences  - Avoid friction and shearing  - Provide appropriate hygiene as needed including keeping skin clean and dry  - Evaluate need for skin moisturizer/barrier cream  - Collaborate with interdisciplinary team   - Patient/family teaching  - Consider wound care consult   Outcome: Progressing     Problem: Nutrition/Hydration-ADULT  Goal: Nutrient/Hydration intake appropriate for improving, restoring or maintaining nutritional needs  Description: Monitor and assess patient's nutrition/hydration status for malnutrition  Collaborate with interdisciplinary team and initiate plan and interventions as ordered  Monitor patient's weight and dietary intake as ordered or per policy  Utilize nutrition screening tool and intervene as necessary  Determine patient's food preferences and provide high-protein, high-caloric foods as appropriate       INTERVENTIONS:  - Monitor oral intake, urinary output, labs, and treatment plans  - Assess nutrition and hydration status and recommend course of action  - Evaluate amount of meals eaten  - Assist patient with eating if necessary   - Allow adequate time for meals  - Recommend/ encourage appropriate diets, oral nutritional supplements, and vitamin/mineral supplements  - Order, calculate, and assess calorie counts as needed  - Assess need for intravenous fluids  - Provide nutrition/hydration education as appropriate  - Include patient/family/caregiver in decisions related to nutrition  Outcome: Progressing     Problem: GENITOURINARY - ADULT  Goal: Maintains or returns to baseline urinary function  Description: INTERVENTIONS:  - Assess urinary function  - Encourage oral fluids to ensure adequate hydration if ordered  - Administer IV fluids as ordered to ensure adequate hydration  - Administer ordered medications as needed  - Offer frequent toileting  - Follow urinary retention protocol if ordered  Outcome: Progressing  Goal: Absence of urinary retention  Description: INTERVENTIONS:  - Assess patient’s ability to void and empty bladder  - Monitor I/O  - Bladder scan as needed  - Discuss with physician/AP medications to alleviate retention as needed  - Discuss catheterization for long term situations as appropriate  Outcome: Progressing     Problem: HEMATOLOGIC - ADULT  Goal: Maintains hematologic stability  Description: INTERVENTIONS  - Assess for signs and symptoms of bleeding or hemorrhage  - Monitor labs  - Administer supportive blood products/factors as ordered and appropriate  Outcome: Progressing

## 2022-12-30 NOTE — PHYSICAL THERAPY NOTE
PHYSICAL THERAPY TREATMENT NOTE  NAME:  Wayne Day  DATE: 12/30/22    Length Of Stay: 71  Performed at least 2 patient identifiers during session: Name and ID bracelet    TREATMENT:      12/30/22 1421   PT Last Visit   PT Visit Date 12/30/22   Note Type   Note Type Treatment   Pain Assessment   Pain Assessment Tool 0-10   Pain Score 5   Pain Location/Orientation Orientation: Right;Location: Leg   Pain Onset/Description Onset: Ongoing   Patient's Stated Pain Goal No pain   Hospital Pain Intervention(s) Repositioned; Ambulation/increased activity; Emotional support; Rest   Restrictions/Precautions   Weight Bearing Precautions Per Order Yes   RLE Weight Bearing Per Order NWB   LLE Weight Bearing Per Order NWB   Other Precautions Fall Risk;Pain  (B AKA, wound vac)   General   Chart Reviewed Yes   Cognition   Overall Cognitive Status WFL   Arousal/Participation Alert; Cooperative   Attention Within functional limits   Orientation Level Oriented X4   Memory Decreased short term memory   Following Commands Follows multistep commands without difficulty   Comments Good participation and effort  Hindered by fear of falling  Motivated to get better and eventually get home  Bed Mobility   Rolling R 3  Moderate assistance   Additional items Assist x 1; Increased time required;Verbal cues; Bedrails   Rolling L 4  Minimal assistance   Additional items Assist x 1; Increased time required;Verbal cues; Bedrails   Supine to Sit 2  Maximal assistance   Additional items Assist x 2; Increased time required;Verbal cues;LE management; Bedrails;HOB elevated  (trunk management)   Sit to Supine 3  Moderate assistance   Additional items Assist x 1; Increased time required;Verbal cues;LE management; Bedrails   Additional Comments Pt w/ difficulties to roll to R side compared to L side  Improvement noted in ability to roll to R this day  Reports "it's been getting easier " Remains rigid and fearful when sitting upright on EOB   However, is able to lay trunk down on bed to return to supine  Mod assist needed for lower half  Assists in scooting self up to [facility] using rails  Assist x 2 to fully reposition and place on L side lying at end of session  Alarm engaged  (Supine to long sit using lower bed rails x 5 times  Amount of assist fluctuated; generally Mod x 2  Pt able to hold each sit x 1 min  Fatigued toward the last 2 reps  Able to achieve full upright seated position  States " wow that's a good arm work [employer] Poor   Dynamic Sitting Poor -   Endurance Deficit   Endurance Deficit Yes   Endurance Deficit Description esily fatigues   Activity Tolerance   Activity Tolerance Patient limited by fatigue;Patient limited by pain   Medical Staff Made Aware OT   Nurse Made Aware yes   Assessment   Prognosis Fair   Problem List Decreased strength;Decreased endurance; Impaired balance;Decreased mobility;Obesity; Decreased skin integrity;Pain   Assessment Pt seen for PT treatment session this date with interventions consisting of therapeutic activity consisting of training: bed mobility, supine<>sit transfers and multiple trials of rolling in bed for hygiene and positioning, 5 reps supine to long sit in bed with use of bedsheet and B bed rail  Pt agreeable to PT treatment session upon arrival, pt found supine in bed w/ HOB elevated, in no apparent distress and responsive  In comparison to previous session, pt with improvements in postural support  Post session: pt returned BTB, bed alarm engaged, all needs in reach and RN notified of session findings/recommendations  Continue to recommend post acute rehabilitation services at time of d/c in order to maximize pt's functional independence and safety w/ mobility  Pt continues to be functioning below baseline level, and remains limited 2* factors listed above and including pt requires extensive Ax2 for functional mobility   PT will continue to see pt during current hospitalization in order to address the deficits listed above and provide interventions consistent w/ POC in effort to achieve STGs  Barriers to Discharge Inaccessible home environment;Decreased caregiver support   Goals   Patient Goals to get stronger   STG Expiration Date 01/02/23   Plan   Treatment/Interventions LE strengthening/ROM; Therapeutic exercise; Endurance training;Bed mobility; Equipment eval/education;Patient/family training; Compensatory technique education;Spoke to nursing;OT   Progress Slow progress, decreased activity tolerance   PT Frequency 4-6x/wk   Recommendation   PT Discharge Recommendation Post acute rehabilitation services   AM-PAC Basic Mobility Inpatient   Turning in Bed Without Bedrails 2   Lying on Back to Sitting on Edge of Flat Bed 2   Moving Bed to Chair 1   Standing Up From Chair 1   Walk in Room 1   Climb 3-5 Stairs 1   Basic Mobility Inpatient Raw Score 8   Turning Head Towards Sound 4   Follow Simple Instructions 4   Low Function Basic Mobility Raw Score 16   Low Function Basic Mobility Standardized Score 25 72   Highest Level Of Mobility   -HLM Goal 3: Sit at edge of bed   JH-HLM Achieved 3: Sit at edge of bed   Education   Education Provided Mobility training   Patient Demonstrates acceptance/verbal understanding   End of Consult   Patient Position at End of Consult Supine; All needs within reach       The patient's AM-PAC Basic Mobility Inpatient Short Form Raw Score is 8  A Raw score of less than or equal to 16 suggests the patient may benefit from discharge to post-acute rehabilitation services  Please also refer to the recommendation of the Physical Therapist for safe discharge planning        Pankaj Pino PTA,PTA

## 2022-12-30 NOTE — CASE MANAGEMENT
Case Management Discharge Planning Note    Patient name Agata Gayle  Location [address] [address] [address]/E4 [address] [facility]-* MRN [id number]  : 1960 Date 2022       Current Admission Date: 10/21/2022  Current Admission Diagnosis:PAD (peripheral artery disease) [facility])   Patient Active Problem List    Diagnosis Date Noted   • Mild protein-calorie malnutrition ([address] ) 2022   • Diarrhea 2022   • CVA (cerebral vascular accident) ([address] ) 2022   • Febrile 2022   • HIT (heparin-induced thrombocytopenia) 2022   • Diabetic ulcer of heel associated with diabetes mellitus due to underlying condition ([address] ) 11/10/2022   • Acute on chronic anemia 10/30/2022   • Generalized edema due to fluid overload 10/27/2022   • PAD (peripheral artery disease) ([address] ) 10/25/2022   • Non healing left heel wound 10/22/2022   • Hypertensive urgency 10/22/2022   • Hypokalemia 10/22/2022   • Wound of lower extremity 10/21/2022   • Epigastric pain 2022   • Type 2 diabetes mellitus with hyperglycemia, with long-term current use of insulin ([address] ) 10/18/2021   • Hyperparathyroidism ([address] ) 10/18/2021   • 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 ([address] ) 2021   • 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   • Moderate protein-calorie malnutrition ([address] ) 2020   • Acute colitis 2020   • Arthritis 2019   • Depression, recurrent ([address] ) 2018   • Class 3 severe obesity due to excess calories with serious comorbidity and body mass index (BMI) of 40 0 to 44 9 in adult [facility]) 2018   • Esophageal reflux 2018   • Uncontrolled type 2 diabetes with neuropathy 2018   • Diabetic peripheral neuropathy ([address] ) 15/08/1971   • Systolic murmur    • St[facility] ([facility] ) 2015   • Anxiety 2015   • Vitamin D deficiency 2015   • Benign essential hypertension 2012   • Familial combined hyperlipidemia 2012      LOS (days): 69  Geometric Mean LOS (GMLOS) (days):   Days to GMLOS:     OBJECTIVE:  Risk of Unplanned Readmission Score: 31 41         Current admission status: Inpatient   Preferred Pharmacy:   [id number], [address]  [address] [place] [address] [address]  [facility] [address]  Phone: [phone] Fax: [phone]    Primary Care Provider: ALEX Hamilton    Primary Insurance: BLUE CROSS  Secondary Insurance:     DISCHARGE DETAILS:    CM spoke with patient's daughter regarding insurance issues for discharge  Daughter states that patient does not have [employer] (as in our system currently)  She has MA plan with [employer] until 1/14/23  Starting on 1/15/23 patient will be active with [facility] Medicaid plan  CM made [facility] aware and emailed [facility]  Insurance verification- awaiting a response for insurance verification info  CM will continue to follow patient  Daughter states that she dropped off copies of patients insurance cards/#s at [facility] and they should have this info  UPDATE- from Inpatient insurance verification: "Medicaid became effective 12/1/2022- I added as 2ndry because her primary is still stating active as recent as yesterday on the 15 day report"  MA plan ([employer] plus) will be primary from 1/1/23-1/14/23 and [facility] MA plan will become primary insurance as of 1/15/23  CM will make STRs aware of this via [employer]

## 2022-12-31 LAB
GLUCOSE SERPL-MCNC: 101 MG/DL (ref 65–140)
GLUCOSE SERPL-MCNC: 137 MG/DL (ref 65–140)
GLUCOSE SERPL-MCNC: 143 MG/DL (ref 65–140)
GLUCOSE SERPL-MCNC: 155 MG/DL (ref 65–140)

## 2022-12-31 PROCEDURE — 99232 SBSQ HOSP IP/OBS MODERATE 35: CPT | Performed by: INTERNAL MEDICINE

## 2022-12-31 PROCEDURE — NC001 PR NO CHARGE: Performed by: SURGERY

## 2022-12-31 PROCEDURE — 82948 REAGENT STRIP/BLOOD GLUCOSE: CPT

## 2022-12-31 RX ADMIN — DOXYCYCLINE 100 MG: 100 CAPSULE ORAL at 09:52

## 2022-12-31 RX ADMIN — ATORVASTATIN CALCIUM 40 MG: 40 TABLET, FILM COATED ORAL at 17:20

## 2022-12-31 RX ADMIN — SERTRALINE HYDROCHLORIDE 175 MG: 100 TABLET ORAL at 09:52

## 2022-12-31 RX ADMIN — GABAPENTIN 400 MG: 400 CAPSULE ORAL at 17:20

## 2022-12-31 RX ADMIN — AMLODIPINE BESYLATE 10 MG: 10 TABLET ORAL at 09:52

## 2022-12-31 RX ADMIN — GABAPENTIN 400 MG: 400 CAPSULE ORAL at 09:52

## 2022-12-31 RX ADMIN — QUETIAPINE FUMARATE 25 MG: 25 TABLET ORAL at 21:17

## 2022-12-31 RX ADMIN — CLOPIDOGREL BISULFATE 75 MG: 75 TABLET ORAL at 09:53

## 2022-12-31 RX ADMIN — OXYCODONE HYDROCHLORIDE 10 MG: 10 TABLET, FILM COATED, EXTENDED RELEASE ORAL at 09:55

## 2022-12-31 RX ADMIN — BUPROPION HYDROCHLORIDE 150 MG: 150 TABLET, FILM COATED, EXTENDED RELEASE ORAL at 09:52

## 2022-12-31 RX ADMIN — GABAPENTIN 400 MG: 400 CAPSULE ORAL at 21:17

## 2022-12-31 RX ADMIN — METHOCARBAMOL 1000 MG: 500 TABLET ORAL at 21:17

## 2022-12-31 RX ADMIN — METOPROLOL SUCCINATE 100 MG: 100 TABLET, FILM COATED, EXTENDED RELEASE ORAL at 09:55

## 2022-12-31 RX ADMIN — WARFARIN SODIUM 3 MG: 3 TABLET ORAL at 17:20

## 2022-12-31 RX ADMIN — PANTOPRAZOLE SODIUM 40 MG: 40 TABLET, DELAYED RELEASE ORAL at 06:28

## 2022-12-31 RX ADMIN — FERROUS SULFATE TAB 325 MG (65 MG ELEMENTAL FE) 325 MG: 325 (65 FE) TAB at 13:05

## 2022-12-31 RX ADMIN — SENNOSIDES 8.6 MG: 8.6 TABLET, FILM COATED ORAL at 17:20

## 2022-12-31 RX ADMIN — POTASSIUM CHLORIDE 20 MEQ: 1500 TABLET, EXTENDED RELEASE ORAL at 09:52

## 2022-12-31 RX ADMIN — MICONAZOLE NITRATE: 20 CREAM TOPICAL at 13:05

## 2022-12-31 RX ADMIN — DOXAZOSIN 4 MG: 4 TABLET ORAL at 21:17

## 2022-12-31 RX ADMIN — OXYCODONE HYDROCHLORIDE 10 MG: 10 TABLET, FILM COATED, EXTENDED RELEASE ORAL at 21:17

## 2022-12-31 RX ADMIN — SENNOSIDES 8.6 MG: 8.6 TABLET, FILM COATED ORAL at 09:52

## 2022-12-31 RX ADMIN — POLYETHYLENE GLYCOL 3350 17 G: 17 POWDER, FOR SOLUTION ORAL at 09:54

## 2022-12-31 RX ADMIN — DOXYCYCLINE 100 MG: 100 CAPSULE ORAL at 21:17

## 2022-12-31 RX ADMIN — LISINOPRIL 40 MG: 20 TABLET ORAL at 09:52

## 2022-12-31 RX ADMIN — METHOCARBAMOL 1000 MG: 500 TABLET ORAL at 06:28

## 2022-12-31 RX ADMIN — METHOCARBAMOL 1000 MG: 500 TABLET ORAL at 13:05

## 2022-12-31 RX ADMIN — INSULIN GLARGINE 10 UNITS: 100 INJECTION, SOLUTION SUBCUTANEOUS at 21:17

## 2022-12-31 NOTE — PROGRESS NOTES
[facility] [facility]  Progress Note - [address] 1960, 65 y o  female MRN: [id number]  Unit/Bed#: E4 -01 Encounter: [id number]  Primary Care Provider: ALEX Franco   Date and time admitted to hospital: 10/21/2022  7:58 PM    * PAD (peripheral artery disease) ([facility] )  Assessment & Plan  · She was initially admitted for nonhealing wound of the left lower extremity, requiring multiple endovascular interventions and ultimately underwent left AKA on 11/23/2022 and right AKA on 12/1/22  · S/p right stump debridement and vac placement 12/16/22  · For additional right stump washout and VAC exchange- 12/19/2022  per surgery  · Continue pain control, incentive spirometer  · Eventual short-term rehab placement when medically stable  · Management per primary service    Febrile  Assessment & Plan  Intermittent fever; but currently afebrile x > 48 hours  UA positive, but likely contaminant rather than an actual urinary tract infection  Possibly post op fever  · Continue to have fever; wound cultures from recent wound washout with enterobacteria  · ID recommending 10 days of doxycycline twice daily; abx through 1/2    HIT (heparin-induced thrombocytopenia)  Assessment & Plan  · Positive heparin antibody on 11/11/2022  · Completed 5 days of overlap with argatroban and Coumadin   · Continue coumadin for minimum of 4 weeks  · Goal INR 2-3    Diabetic ulcer of heel associated with diabetes mellitus due to underlying condition [facility])  Assessment & Plan  S/p left aka 11/23/2022  · Post-op wound management per vascular surgery  · Await next step in management from vascular surgery, underwent surgical washout of right stump debridement of left stump on 12/19  · Pain appears well controlled on OxyContin 10 twice daily and gabapentin 400mg tid    · As needed pain medications taken sparingly    Acute on chronic anemia  Assessment & Plan  · She has had multiple  blood transfusions during this admission  · Last blood transfusion was on  12/15/2022 prior to her surgery  · Monitor  · Hemoglobin continues to decline, FOBT negative  · Review of recent iron panel appears to show picture of anemia of chronic disease  · Additionally, iron noted to be low, will initiate iron supplementation  · appears to be stable at 7 9, monitor    Type 2 diabetes mellitus with hyperglycemia, with long-term current use of insulin [facility])  Assessment & Plan  Lab Results   Component Value Date    HGBA1C 9 8 (H) 10/25/2022     Recent Labs     12/30/22  0757 12/30/22  1113 12/30/22  1548 12/31/22  0809   POCGLU 112 117 179* 101     · Significantly reduced regimen from home due to poor oral intake  · Goal sugar < 180  · Continue Lantus to 10 units at night  · Continue sliding scale insulin    Stroke [facility])  Assessment & Plan  · Noted to have change in mental status on 11/13/2022  · CT confirmed moderate right parietal lobe stroke, likely embolic in the setting of HIT with thrombosis  · She was treated with argatroban/coumadin bridge  · INR 2 71 will give warfarin 3 mg today, monitor INR closely  · Goal INR 2-3    Benign essential hypertension  Assessment & Plan  · Continue amlodipine 10 mg daily, doxazosin 4 mg at bedtime, metoprolol 100 mg daily, lisinopril 40 mg daily with hold parameters    Depression, recurrent ([address] )  Assessment & Plan  Evaluated by Psychiatry x 2 on this admission  ·  continue Zoloft 175 mg daily, Wellbutrin 150 mg daily, Seroquel 25 mg at bedtime  VTE Pharmacologic Prophylaxis:   Pharmacologic: Warfarin    Patient Centered Rounds: I have performed bedside rounds with nursing staff today  Time Spent for Care: 20 minutes  More than 50% of total time spent on counseling and coordination of care as described above      Current Length of Stay: 70 day(s)    Current Patient Status: Inpatient     Code Status: Level 1 - Full Code      Subjective:   Patient seen and examined at bedside, comfortable, denies any complaints    Objective:     Vitals:   Temp (24hrs), Av 8 °F (36 6 °C), Min:97 7 °F (36 5 °C), Max:98 °F (36 7 °C)    Temp:  [97 7 °F (36 5 °C)-98 °F (36 7 °C)] 97 8 °F (36 6 °C)  HR:  [76-80] 76  Resp:  [20] 20  BP: (104-161)/(55-72) 142/58  SpO2:  [92 %-96 %] 95 %  Body mass index is 24 75 kg/m²  Input and Output Summary (last 24 hours): Intake/Output Summary (Last 24 hours) at 2022 0834  Last data filed at 2022 1814  Gross per 24 hour   Intake --   Output 500 ml   Net -500 ml       Physical Exam:    Constitutional: Patient is oriented to person, place and time, no acute distress  HEENT:  Normocephalic, atraumatic  Cardiovascular: Normal S1S2, RRR, No murmurs/rubs/gallops appreciated  Pulmonary:  Bilateral air entry, No rhonchi/rales/wheezing appreciated  Abdominal: Soft, Bowel sounds present, Non-tender, Non-distended  Extremities: Bilateral AKA, right AKA with wound VAC in place  Neurological: Awake, alert  Skin:  Warm, dry    Additional Data:     Labs:    Results from last 7 days   Lab Units 22  0455   WBC Thousand/uL 8 29   HEMOGLOBIN g/dL 7 9*   HEMATOCRIT % 27 0*   PLATELETS Thousands/uL 370   NEUTROS PCT % 59   LYMPHS PCT % 23   MONOS PCT % 9   EOS PCT % 6           Invalid input(s): LABALBU  Results from last 7 days   Lab Units 22  0527   INR  2 56*        I Have Reviewed All Lab Data Listed Above  Invasive Devices     Peripheral Intravenous Line  Duration           Peripheral IV 22 Dorsal (posterior); Left Forearm 2 days                     Recent Cultures (last 7 days):           Last 24 Hours Medication List:   Current Facility-Administered Medications   Medication Dose Route Frequency Provider Last Rate   • acetaminophen  650 mg Oral Q6H PRN Domenica Gustavo, DO     • ALPRAZolam  0 25 mg Oral Daily PRN Domenica Gustavo, DO     • aluminum-magnesium hydroxide-simethicone  30 mL Oral Q4H PRN Domencia Chen, DO     • amLODIPine  10 mg Oral Daily Ryan Ragsdale, DO     • atorvastatin  40 mg Oral Daily With Zelpha Jacinta, DO     • buPROPion  150 mg Oral Daily Ryan Ragsdale, DO     • clopidogrel  75 mg Oral Daily Ryan Ragsdale, DO     • collagenase   Topical Daily Ryan Ragsdale, DO     • doxazosin  4 mg Oral HS Ryan Ragsdale, DO     • doxycycline hyclate  100 mg Oral Q12H Sagrario Paul MD     • ferrous sulfate  325 mg Oral Daily With Lunch Calogero Reba, DO     • gabapentin  400 mg Oral TID Ryan Ragsdale, DO     • insulin glargine  10 Units Subcutaneous HS Ryan Ragsdale, DO     • insulin lispro  1-6 Units Subcutaneous TID AC Ryan Ragsdale, DO     • labetalol  10 mg Intravenous Q6H PRN Ryan Ragsdale, DO     • lisinopril  40 mg Oral Daily Ryan Ragsdale, DO     • methocarbamol  1,000 mg Oral [facility] Ryan Ragsdale, DO     • metoclopramide  10 mg Intravenous Q6H PRN Ryan Ragsdale, DO     • metoprolol succinate  100 mg Oral Daily Ronda Hudson MD     • GINA ANTIFUNGAL   Topical BID Varun German MD     • naloxone  0 04 mg Intravenous Q1MIN PRN Ryan Ragsdale, DO     • ondansetron  4 mg Intravenous Q6H PRN Ryan Ragsdale, DO     • oxyCODONE  10 mg Oral Q12H [address] Ryan Ragsdale, DO     • oxyCODONE  2 5 mg Oral Q4H PRN Ryan Ragsdale, DO      Or   • oxyCODONE  5 mg Oral Q4H PRN Ryan Ragsdale, DO      Or   • oxyCODONE  7 5 mg Oral Q4H PRN Ryan Ragsdale, DO     • pantoprazole  40 mg Oral Early Morning Ryan Ragsdale, DO     • polyethylene glycol  17 g Oral BID Ryan Ragsdale, DO     • potassium chloride  20 mEq Oral Daily Ronda Hudson MD     • QUEtiapine  25 mg Oral HS Ryan Ragsdale, DO     • senna  1 tablet Oral BID Ryan Ragsdale, DO     • sertraline  175 mg Oral Daily Ryan Ragsdale, DO     • warfarin  3 mg Oral Daily (warfarin) Varun German MD          Today, Patient Was Seen By: Varun German MD

## 2022-12-31 NOTE — ASSESSMENT & PLAN NOTE
Lab Results   Component Value Date    HGBA1C 9 8 (H) 10/25/2022     Recent Labs     12/30/22  0757 12/30/22  1113 12/30/22  1548 12/31/22  0809   POCGLU 112 117 179* 101     · Significantly reduced regimen from home due to poor oral intake  · Goal sugar < 180  · Continue Lantus to 10 units at night  · Continue sliding scale insulin

## 2023-01-01 LAB
GLUCOSE SERPL-MCNC: 127 MG/DL (ref 65–140)
GLUCOSE SERPL-MCNC: 130 MG/DL (ref 65–140)
GLUCOSE SERPL-MCNC: 160 MG/DL (ref 65–140)
GLUCOSE SERPL-MCNC: 97 MG/DL (ref 65–140)

## 2023-01-01 PROCEDURE — NC001 PR NO CHARGE: Performed by: SURGERY

## 2023-01-01 PROCEDURE — 82948 REAGENT STRIP/BLOOD GLUCOSE: CPT

## 2023-01-01 PROCEDURE — 99232 SBSQ HOSP IP/OBS MODERATE 35: CPT | Performed by: INTERNAL MEDICINE

## 2023-01-01 RX ADMIN — OXYCODONE HYDROCHLORIDE 10 MG: 10 TABLET, FILM COATED, EXTENDED RELEASE ORAL at 09:18

## 2023-01-01 RX ADMIN — OXYCODONE HYDROCHLORIDE 10 MG: 10 TABLET, FILM COATED, EXTENDED RELEASE ORAL at 21:42

## 2023-01-01 RX ADMIN — DOXYCYCLINE 100 MG: 100 CAPSULE ORAL at 21:42

## 2023-01-01 RX ADMIN — QUETIAPINE FUMARATE 25 MG: 25 TABLET ORAL at 21:43

## 2023-01-01 RX ADMIN — INSULIN LISPRO 1 UNITS: 100 INJECTION, SOLUTION INTRAVENOUS; SUBCUTANEOUS at 12:44

## 2023-01-01 RX ADMIN — SENNOSIDES 8.6 MG: 8.6 TABLET, FILM COATED ORAL at 17:21

## 2023-01-01 RX ADMIN — DOXAZOSIN 4 MG: 4 TABLET ORAL at 21:42

## 2023-01-01 RX ADMIN — PANTOPRAZOLE SODIUM 40 MG: 40 TABLET, DELAYED RELEASE ORAL at 05:24

## 2023-01-01 RX ADMIN — LISINOPRIL 40 MG: 20 TABLET ORAL at 09:18

## 2023-01-01 RX ADMIN — METOPROLOL SUCCINATE 100 MG: 100 TABLET, FILM COATED, EXTENDED RELEASE ORAL at 09:18

## 2023-01-01 RX ADMIN — GABAPENTIN 400 MG: 400 CAPSULE ORAL at 21:43

## 2023-01-01 RX ADMIN — CLOPIDOGREL BISULFATE 75 MG: 75 TABLET ORAL at 09:18

## 2023-01-01 RX ADMIN — WARFARIN SODIUM 3 MG: 3 TABLET ORAL at 17:21

## 2023-01-01 RX ADMIN — SERTRALINE HYDROCHLORIDE 175 MG: 100 TABLET ORAL at 09:17

## 2023-01-01 RX ADMIN — ATORVASTATIN CALCIUM 40 MG: 40 TABLET, FILM COATED ORAL at 17:21

## 2023-01-01 RX ADMIN — GABAPENTIN 400 MG: 400 CAPSULE ORAL at 09:18

## 2023-01-01 RX ADMIN — SENNOSIDES 8.6 MG: 8.6 TABLET, FILM COATED ORAL at 09:17

## 2023-01-01 RX ADMIN — POLYETHYLENE GLYCOL 3350 17 G: 17 POWDER, FOR SOLUTION ORAL at 09:19

## 2023-01-01 RX ADMIN — POTASSIUM CHLORIDE 20 MEQ: 1500 TABLET, EXTENDED RELEASE ORAL at 09:18

## 2023-01-01 RX ADMIN — DOXYCYCLINE 100 MG: 100 CAPSULE ORAL at 09:17

## 2023-01-01 RX ADMIN — METHOCARBAMOL 1000 MG: 500 TABLET ORAL at 14:41

## 2023-01-01 RX ADMIN — FERROUS SULFATE TAB 325 MG (65 MG ELEMENTAL FE) 325 MG: 325 (65 FE) TAB at 12:47

## 2023-01-01 RX ADMIN — METHOCARBAMOL 1000 MG: 500 TABLET ORAL at 05:24

## 2023-01-01 RX ADMIN — METHOCARBAMOL 1000 MG: 500 TABLET ORAL at 21:42

## 2023-01-01 RX ADMIN — BUPROPION HYDROCHLORIDE 150 MG: 150 TABLET, FILM COATED, EXTENDED RELEASE ORAL at 09:18

## 2023-01-01 RX ADMIN — AMLODIPINE BESYLATE 10 MG: 10 TABLET ORAL at 09:17

## 2023-01-01 RX ADMIN — MICONAZOLE NITRATE: 20 CREAM TOPICAL at 17:24

## 2023-01-01 RX ADMIN — INSULIN GLARGINE 10 UNITS: 100 INJECTION, SOLUTION SUBCUTANEOUS at 21:43

## 2023-01-01 RX ADMIN — GABAPENTIN 400 MG: 400 CAPSULE ORAL at 17:23

## 2023-01-01 NOTE — ASSESSMENT & PLAN NOTE
65year old female former smoker w/HTN, HLD, uncontrolled type II DM (A1c 9 8), hx CVA, presenting with nonhealing extensive L heel ulceration and R hallux and 5th digit ulceration  Vascular surgery consulted for input  S/p multiple b/l endovascular interventions  JUSTIN  R hemispheric CVA    S/p L AKA 11/23/22 [facility])  S/p R AKA, wound debridement 12/1/22 [facility])  S/p R AKA wound/stump washout and VAC placement 12/16/22 [facility])  S/p R AKA wound/stump VAC change, L AKA stump washout/packing 12/19/22 ([facility])    Wound Cx: enterobacter cloacae     Recommendations:  - Bilateral tissue loss in the setting of uncontrolled type II DM and NYDIA on admission, now s/p multiple angiograms w/intervention    - Endovascular interventions, c/b atheroembolic event to the RLE and JUSTIN with R hemispheric CVA  - Now s/p L AKA on 11/23/22 and R AKA w/ proximal thigh wound debridements 12/1/22    - R AKA stump with incisional dehiscence and thigh wounds requiring debridement  Taken to the OR on 12/16 for washout and VAC placement with return to the OR 12/19 for VAC change  - L AKA stump incision dehiscence now closed  Continue local wound care  - Appreciate ID for assistance with abx- L stump cultures growing out enterobacter cloacae  Patient stable on current plan per ID- doxycycline through 1/2/23  - Right AKA wound VAC changed today at bedside  Continue wound VAC therapy  Continuous suction 125 mmHg  change MWF-next change Monday   May be able to discontinue on Monday 1/2  - Incentive spirometer   - Continue statin, Plavix  - Continue coumadin  - Medical management and glucose control per SLIM  - Continue oral pain regimen, adjusted to long acting which appears to be controlling patient's pain well   - Appreciate psych input and management - addition of wellbutrin and adjustment of zoloft   - Disposition planning for rehab: will follow up with CM, currently pending STR acceptance

## 2023-01-01 NOTE — PROGRESS NOTES
[address] [facility]  [facility] 1960, 65 y [facility]  female MRN: [id number]  Unit/Bed#: E4 -01 Encounter: [id number]  Primary Care Provider: ALEX Bland   Date and time admitted to hospital: 10/21/2022  7:58 PM    * PAD (peripheral artery disease) [facility])  Assessment & Plan  65year old female former smoker w/HTN, HLD, uncontrolled type II DM (A1c 9 8), hx CVA, presenting with nonhealing extensive L heel ulceration and R hallux and 5th digit ulceration  Vascular surgery consulted for input  S/p multiple b/l endovascular interventions  JUSTIN  R hemispheric CVA    S/p L AKA 11/23/22 [facility])  S/p R AKA, wound debridement 12/1/22 [facility])  S/p R AKA wound/stump washout and VAC placement 12/16/22 Jayme Hunger)  S/p R AKA wound/stump VAC change, L AKA stump washout/packing 12/19/22 (Celestino)    Wound Cx: enterobacter cloacae     Recommendations:  - Bilateral tissue loss in the setting of uncontrolled type II DM and NYDIA on admission, now s/p multiple angiograms w/intervention    - Endovascular interventions, c/b atheroembolic event to the RLE and JUSTIN with R hemispheric CVA  - Now s/p L AKA on 11/23/22 and R AKA w/ proximal thigh wound debridements 12/1/22    - R AKA stump with incisional dehiscence and thigh wounds requiring debridement  Taken to the OR on 12/16 for washout and VAC placement with return to the OR 12/19 for VAC change  - L AKA stump incision dehiscence now closed  Continue local wound care  - Appreciate ID for assistance with abx- L stump cultures growing out enterobacter cloacae  Patient stable on current plan per ID- doxycycline through 1/2/23  - Right AKA wound VAC changed today at bedside  Continue wound VAC therapy  Continuous suction 125 mmHg  change MWF-next change Monday   May be able to discontinue on Monday 1/2  - Incentive spirometer   - Continue statin, Plavix  - Continue coumadin  - Medical management and glucose control per SLIM  - Continue oral pain regimen, adjusted to long acting which appears to be controlling patient's pain well   - Appreciate psych input and management - addition of wellbutrin and adjustment of zoloft   - Disposition planning for rehab: will follow up with CM, currently pending STR acceptance      Vitals:  /82 (BP Location: Left arm)   Pulse 82   Temp 97 7 °F (36 5 °C) (Temporal)   Resp 20   Ht 5' 4" (1 626 m)   Wt 65 7 kg (144 lb 13 5 oz)   SpO2 94%   BMI 24 86 kg/m²     I/Os:  I/O last 3 completed shifts: In: 600 [P O :600]  Out: 0   No intake/output data recorded  Lab Results and Cultures:   CBC with diff:   Lab Results   Component Value Date    WBC 8 29 12/27/2022    HGB 7 9 (L) 12/27/2022    HCT 27 0 (L) 12/27/2022    MCV 87 12/27/2022     12/27/2022    MCH 25 3 (L) 12/27/2022    MCHC 29 3 (L) 12/27/2022    RDW 16 4 (H) 12/27/2022    MPV 8 4 (L) 12/27/2022    NRBC 0 12/27/2022   ,   BMP/CMP:  Lab Results   Component Value Date    SODIUM 141 12/24/2022    K 3 6 12/24/2022     12/24/2022    CO2 29 12/24/2022    BUN 8 12/24/2022    CREATININE 0 65 12/24/2022    CALCIUM 8 1 (L) 12/24/2022    AST 27 12/22/2022    ALT 13 12/22/2022    ALKPHOS 167 (H) 12/22/2022    EGFR 96 12/24/2022   ,   Lipid Panel: No results found for: CHOL,   Coags:   Lab Results   Component Value Date    PTT 95 (H) 12/12/2022    INR 2 56 (H) 12/30/2022   ,     Blood Culture:   Lab Results   Component Value Date    BLOODCX No Growth After 5 Days   11/13/2022    BLOODCX Staphylococcus coagulase negative (A) 11/13/2022   ,   Urinalysis:   Lab Results   Component Value Date    COLORU Yellow 11/12/2022    CLARITYU Slightly Cloudy 11/12/2022    SPECGRAV 1 025 11/12/2022    PHUR 5 5 11/12/2022    PHUR 6 5 02/23/2018    LEUKOCYTESUR Trace (A) 11/12/2022    NITRITE Positive (A) 11/12/2022    GLUCOSEU Negative 11/12/2022    KETONESU Negative 11/12/2022    BILIRUBINUR Negative 11/12/2022    BLOODU Large (A) 11/12/2022   ,   Urine Culture:   Lab Results   Component Value Date    URINECX >100,000 cfu/ml Enterobacter cloacae complex (A) 11/12/2022    URINECX 70,000-79,000 cfu/ml Kluyveromyces marxianus (A) 11/12/2022   ,   Wound Culure: No results found for: WOUNDCULT    Medications:  Current Facility-Administered Medications   Medication Dose Route Frequency   • acetaminophen (TYLENOL) tablet 650 mg  650 mg Oral Q6H PRN   • ALPRAZolam (XANAX) tablet 0 25 mg  0 25 mg Oral Daily PRN   • aluminum-magnesium hydroxide-simethicone (MYLANTA) oral suspension 30 mL  30 mL Oral Q4H PRN   • amLODIPine (NORVASC) tablet 10 mg  10 mg Oral Daily   • atorvastatin (LIPITOR) tablet 40 mg  40 mg Oral Daily With Dinner   • buPROPion (WELLBUTRIN XL) 24 hr tablet 150 mg  150 mg Oral Daily   • clopidogrel (PLAVIX) tablet 75 mg  75 mg Oral Daily   • collagenase (SANTYL) ointment   Topical Daily   • doxazosin (CARDURA) tablet 4 mg  4 mg Oral HS   • doxycycline hyclate (VIBRAMYCIN) capsule 100 mg  100 mg Oral Q12H [address]   • ferrous sulfate tablet 325 mg  325 mg Oral Daily With Lunch   • gabapentin (NEURONTIN) capsule 400 mg  400 mg Oral TID   • insulin glargine (LANTUS) subcutaneous injection 10 Units 0 1 mL  10 Units Subcutaneous HS   • insulin lispro (HumaLOG) 100 units/mL subcutaneous injection 1-6 Units  1-6 Units Subcutaneous TID AC   • labetalol (NORMODYNE) injection 10 mg  10 mg Intravenous Q6H PRN   • lisinopril (ZESTRIL) tablet 40 mg  40 mg Oral Daily   • methocarbamol (ROBAXIN) tablet 1,000 mg  1,000 mg Oral Q8H [address]   • metoclopramide (REGLAN) injection 10 mg  10 mg Intravenous Q6H PRN   • metoprolol succinate (TOPROL-XL) 24 hr tablet 100 mg  100 mg Oral Daily   • moisture barrier miconazole 2% cream (aka GINA MOISTURE BARRIER ANTIFUNGAL CREAM)   Topical BID   • naloxone (NARCAN) 0 04 mg/mL syringe 0 04 mg  0 04 mg Intravenous Q1MIN PRN   • ondansetron (ZOFRAN) injection 4 mg  4 mg Intravenous Q6H PRN   • oxyCODONE (OxyCONTIN) 12 hr tablet 10 mg  10 mg Oral Q12H [facility]   • oxyCODONE (ROXICODONE) IR tablet 2 5 mg  2 5 mg Oral Q4H PRN    Or   • oxyCODONE (ROXICODONE) IR tablet 5 mg  5 mg Oral Q4H PRN    Or   • oxyCODONE (ROXICODONE) IR tablet 7 5 mg  7 5 mg Oral Q4H PRN   • pantoprazole (PROTONIX) EC tablet 40 mg  40 mg Oral Early Morning   • polyethylene glycol (MIRALAX) packet 17 g  17 g Oral BID   • potassium chloride (K-DUR,KLOR-CON) CR tablet 20 mEq  20 mEq Oral Daily   • QUEtiapine (SEROquel) tablet 25 mg  25 mg Oral HS   • senna (SENOKOT) tablet 8 6 mg  1 tablet Oral BID   • sertraline (ZOLOFT) tablet 175 mg  175 mg Oral Daily   • warfarin (COUMADIN) tablet 3 mg  3 mg Oral Daily (warfarin)       Physical Exam:  General: No acute distress, lying comfortably in bed  Neuro: A&O x3  CV: Regular rate and rhythm  Lungs: Normal work of breathing on room air  Extremities: Left AKA stump incision intact with staples, right intact with wound VAC      Eva Cole MD  1/1/2023

## 2023-01-01 NOTE — PROGRESS NOTES
[facility] [facility]  [facility] 1960, 65 [facility]  female MRN: [id number]  Unit/Bed#: E4 -01 Encounter: [id number]  Primary Care Provider: ALEX Leiva   Date and time admitted to hospital: 10/21/2022  7:58 PM    * PAD (peripheral artery disease) [facility])  Assessment & Plan  65year old female former smoker w/HTN, HLD, uncontrolled type II DM (A1c 9 8), hx CVA, presenting with nonhealing extensive L heel ulceration and R hallux and 5th digit ulceration  Vascular surgery consulted for input  S/p multiple b/l endovascular interventions  JUSTIN  R hemispheric CVA    S/p L AKA 11/23/22 [facility])  S/p R AKA, wound debridement 12/1/22 [facility])  S/p R AKA wound/stump washout and VAC placement 12/16/22 Mara Mccoy  S/p R AKA wound/stump VAC change, L AKA stump washout/packing 12/19/22 (Celestino)    Wound Cx: enterobacter cloacae     Recommendations:  - Bilateral tissue loss in the setting of uncontrolled type II DM and NYDIA on admission, now s/p multiple angiograms w/intervention    - Endovascular interventions, c/b atheroembolic event to the RLE and JUSTIN with R hemispheric CVA  - Now s/p L AKA on 11/23/22 and R AKA w/ proximal thigh wound debridements 12/1/22    - R AKA stump with incisional dehiscence and thigh wounds requiring debridement  Taken to the OR on 12/16 for washout and VAC placement with return to the OR 12/19 for VAC change  - L AKA stump incision dehiscence now closed  Continue local wound care  - Appreciate ID for assistance with abx- L stump cultures growing out enterobacter cloacae  Patient stable on current plan per ID- doxycycline through 1/2/23  - Right AKA wound VAC changed today at bedside  Continue wound VAC therapy  Continuous suction 125 mmHg  change MWF-next change Monday   May be able to discontinue on Monday 1/2  - Incentive spirometer   - Continue statin, Plavix  - Continue coumadin  - Medical management and glucose control per SLIM  - Continue oral pain regimen, adjusted to long acting which appears to be controlling patient's pain well   - Appreciate psych input and management - addition of wellbutrin and adjustment of zoloft   - Disposition planning for rehab: will follow up with CM, currently pending STR acceptance          Vitals:  /50 (BP Location: Right arm)   Pulse 76   Temp 98 3 °F (36 8 °C) (Temporal)   Resp 18   Ht 5' 4" (1 626 m)   Wt 65 3 kg (143 lb 15 4 oz)   SpO2 92%   BMI 24 71 kg/m²     I/Os:  I/O last 3 completed shifts: In: 600 [P O :600]  Out: -   No intake/output data recorded  Lab Results and Cultures:   CBC with diff:   Lab Results   Component Value Date    WBC 8 29 12/27/2022    HGB 7 9 (L) 12/27/2022    HCT 27 0 (L) 12/27/2022    MCV 87 12/27/2022     12/27/2022    MCH 25 3 (L) 12/27/2022    MCHC 29 3 (L) 12/27/2022    RDW 16 4 (H) 12/27/2022    MPV 8 4 (L) 12/27/2022    NRBC 0 12/27/2022   ,   BMP/CMP:  Lab Results   Component Value Date    SODIUM 141 12/24/2022    K 3 6 12/24/2022     12/24/2022    CO2 29 12/24/2022    BUN 8 12/24/2022    CREATININE 0 65 12/24/2022    CALCIUM 8 1 (L) 12/24/2022    AST 27 12/22/2022    ALT 13 12/22/2022    ALKPHOS 167 (H) 12/22/2022    EGFR 96 12/24/2022   ,   Lipid Panel: No results found for: CHOL,   Coags:   Lab Results   Component Value Date    PTT 95 (H) 12/12/2022    INR 2 56 (H) 12/30/2022   ,     Blood Culture:   Lab Results   Component Value Date    BLOODCX No Growth After 5 Days   11/13/2022    BLOODCX Staphylococcus coagulase negative (A) 11/13/2022   ,   Urinalysis:   Lab Results   Component Value Date    COLORU Yellow 11/12/2022    CLARITYU Slightly Cloudy 11/12/2022    SPECGRAV 1 025 11/12/2022    PHUR 5 5 11/12/2022    PHUR 6 5 02/23/2018    LEUKOCYTESUR Trace (A) 11/12/2022    NITRITE Positive (A) 11/12/2022    GLUCOSEU Negative 11/12/2022    KETONESU Negative 11/12/2022    BILIRUBINUR Negative 11/12/2022    BLOODU Large (A) 11/12/2022   , Urine Culture:   Lab Results   Component Value Date    URINECX >100,000 cfu/ml Enterobacter cloacae complex (A) 11/12/2022    URINECX 70,000-79,000 cfu/ml Kluyveromyces marxianus (A) 11/12/2022   ,   Wound Culure: No results found for: WOUNDCULT    Medications:  Current Facility-Administered Medications   Medication Dose Route Frequency   • acetaminophen (TYLENOL) tablet 650 mg  650 mg Oral Q6H PRN   • ALPRAZolam (XANAX) tablet 0 25 mg  0 25 mg Oral Daily PRN   • aluminum-magnesium hydroxide-simethicone (MYLANTA) oral suspension 30 mL  30 mL Oral Q4H PRN   • amLODIPine (NORVASC) tablet 10 mg  10 mg Oral Daily   • atorvastatin (LIPITOR) tablet 40 mg  40 mg Oral Daily With Dinner   • buPROPion (WELLBUTRIN XL) 24 hr tablet 150 mg  150 mg Oral Daily   • clopidogrel (PLAVIX) tablet 75 mg  75 mg Oral Daily   • collagenase (SANTYL) ointment   Topical Daily   • doxazosin (CARDURA) tablet 4 mg  4 mg Oral HS   • doxycycline hyclate (VIBRAMYCIN) capsule 100 mg  100 mg Oral Q12H [address]   • ferrous sulfate tablet 325 mg  325 mg Oral Daily With Lunch   • gabapentin (NEURONTIN) capsule 400 mg  400 mg Oral TID   • insulin glargine (LANTUS) subcutaneous injection 10 Units 0 1 mL  10 Units Subcutaneous HS   • insulin lispro (HumaLOG) 100 units/mL subcutaneous injection 1-6 Units  1-6 Units Subcutaneous TID AC   • labetalol (NORMODYNE) injection 10 mg  10 mg Intravenous Q6H PRN   • lisinopril (ZESTRIL) tablet 40 mg  40 mg Oral Daily   • methocarbamol (ROBAXIN) tablet 1,000 mg  1,000 mg Oral Q8H [address]   • metoclopramide (REGLAN) injection 10 mg  10 mg Intravenous Q6H PRN   • metoprolol succinate (TOPROL-XL) 24 hr tablet 100 mg  100 mg Oral Daily   • moisture barrier miconazole 2% cream (aka GINA MOISTURE BARRIER ANTIFUNGAL CREAM)   Topical BID   • naloxone (NARCAN) 0 04 mg/mL syringe 0 04 mg  0 04 mg Intravenous Q1MIN PRN   • ondansetron (ZOFRAN) injection 4 mg  4 mg Intravenous Q6H PRN   • oxyCODONE (OxyCONTIN) 12 hr tablet 10 mg  10 mg Oral Q12H [address]   • oxyCODONE (ROXICODONE) IR tablet 2 5 mg  2 5 mg Oral Q4H PRN    Or   • oxyCODONE (ROXICODONE) IR tablet 5 mg  5 mg Oral Q4H PRN    Or   • oxyCODONE (ROXICODONE) IR tablet 7 5 mg  7 5 mg Oral Q4H PRN   • pantoprazole (PROTONIX) EC tablet 40 mg  40 mg Oral Early Morning   • polyethylene glycol (MIRALAX) packet 17 g  17 g Oral BID   • potassium chloride (K-DUR,KLOR-CON) CR tablet 20 mEq  20 mEq Oral Daily   • QUEtiapine (SEROquel) tablet 25 mg  25 mg Oral HS   • senna (SENOKOT) tablet 8 6 mg  1 tablet Oral BID   • sertraline (ZOLOFT) tablet 175 mg  175 mg Oral Daily   • warfarin (COUMADIN) tablet 3 mg  3 mg Oral Daily (warfarin)       Physical Exam:  General: No acute distress  Neuro: alert and oriented  HEENT: moist mucous membranes  CV: Well perfused, regular rate and rhythm  Lungs: Normal work of breathing, no increased respiratory effort  Abdomen: Soft, non-tender, non-distended  Extremities: No edema, clubbing or cyanosis  Right AKA with wound vac in place, good seal  Left AKA incision intact, well healing    Skin: Warm, dry    Zac Vance MD  1/1/2023

## 2023-01-01 NOTE — ASSESSMENT & PLAN NOTE
Lab Results   Component Value Date    HGBA1C 9 8 (H) 10/25/2022     Recent Labs     12/31/22  1206 12/31/22  1544 12/31/22  2116 01/01/23  0820   POCGLU 143* 137 155* 97     · Significantly reduced regimen from home due to poor oral intake  · Goal sugar < 180  · Continue Lantus to 10 units at night  · Continue sliding scale insulin

## 2023-01-01 NOTE — ASSESSMENT & PLAN NOTE
· She was initially admitted for nonhealing wound of the left lower extremity, requiring multiple endovascular interventions and ultimately underwent left AKA on 11/23/2022 and right AKA on 12/1/22  · S/p right stump debridement and vac placement 12/16/22  · For additional right stump washout and VAC exchange- 12/19/2022  per surgery  · Continue pain control, incentive spirometer  · Eventual short-term rehab placement when medically stable  · Management per primary service    Patient is medically stable and cleared from internal medicine standpoint for discharge to rehab once bed available  We will sign off at this point, please reconsult as needed

## 2023-01-01 NOTE — PROGRESS NOTES
[facility]  Progress Note - [address] 1960, 65 y o  female MRN: [id number]  Unit/Bed#: E4 -01 Encounter: [id number]  Primary Care Provider: ALEX Macias   Date and time admitted to hospital: 10/21/2022  7:58 PM    * PAD (peripheral artery disease) ([facility] [address] )  Assessment & Plan  · She was initially admitted for nonhealing wound of the left lower extremity, requiring multiple endovascular interventions and ultimately underwent left AKA on 11/23/2022 and right AKA on 12/1/22  · S/p right stump debridement and vac placement 12/16/22  · For additional right stump washout and VAC exchange- 12/19/2022  per surgery  · Continue pain control, incentive spirometer  · Eventual short-term rehab placement when medically stable  · Management per primary service    Patient is medically stable and cleared from internal medicine standpoint for discharge to rehab once bed available  We will sign off at this point, please reconsult as needed  Febrile  Assessment & Plan  Intermittent fever; but currently afebrile x > 48 hours  UA positive, but likely contaminant rather than an actual urinary tract infection  Possibly post op fever  · Continue to have fever; wound cultures from recent wound washout with enterobacteria  · ID recommending 10 days of doxycycline twice daily; abx through 1/2    HIT (heparin-induced thrombocytopenia)  Assessment & Plan  · Positive heparin antibody on 11/11/2022  · Completed 5 days of overlap with argatroban and Coumadin   · Continue coumadin for minimum of 4 weeks  · Goal INR 2-3    Diabetic ulcer of heel associated with diabetes mellitus due to underlying condition [facility])  Assessment & Plan  S/p left aka 11/23/2022  · Post-op wound management per vascular surgery    · Await next step in management from vascular surgery, underwent surgical washout of right stump debridement of left stump on 12/19  · Pain appears well controlled on OxyContin 10 twice daily and gabapentin 400mg tid  · As needed pain medications taken sparingly    Acute on chronic anemia  Assessment & Plan  · She has had multiple  blood transfusions during this admission  · Last blood transfusion was on  12/15/2022 prior to her surgery  · Monitor  · Hemoglobin continues to decline, FOBT negative  · Review of recent iron panel appears to show picture of anemia of chronic disease  · Additionally, iron noted to be low, will initiate iron supplementation  · appears to be stable at 7 9, monitor    Type 2 diabetes mellitus with hyperglycemia, with long-term current use of insulin [facility])  Assessment & Plan  Lab Results   Component Value Date    HGBA1C 9 8 (H) 10/25/2022     Recent Labs     12/31/22  1206 12/31/22  1544 12/31/22  2116 01/01/23  0820   POCGLU 143* 137 155* 97     · Significantly reduced regimen from home due to poor oral intake  · Goal sugar < 180  · Continue Lantus to 10 units at night  · Continue sliding scale insulin    Stroke [facility])  Assessment & Plan  · Noted to have change in mental status on 11/13/2022  · CT confirmed moderate right parietal lobe stroke, likely embolic in the setting of HIT with thrombosis  · She was treated with argatroban/coumadin bridge  · INR 2 71 will give warfarin 3 mg today, monitor INR closely  · Goal INR 2-3    Benign essential hypertension  Assessment & Plan  · Continue amlodipine 10 mg daily, doxazosin 4 mg at bedtime, metoprolol 100 mg daily, lisinopril 40 mg daily with hold parameters    Depression, recurrent ([address] )  Assessment & Plan  Evaluated by Psychiatry x 2 on this admission  ·  continue Zoloft 175 mg daily, Wellbutrin 150 mg daily, Seroquel 25 mg at bedtime  VTE Pharmacologic Prophylaxis:   Pharmacologic: Warfarin    Patient Centered Rounds: I have performed bedside rounds with nursing staff today  Time Spent for Care: 20 minutes  More than 50% of total time spent on counseling and coordination of care as described above      Current Length of Stay: 71 day(s)    Current Patient Status: Inpatient     Code Status: Level 1 - Full Code      Subjective:   Patient seen and examined at bedside, awake, denies any complaints    Objective:     Vitals:   Temp (24hrs), Av 8 °F (36 6 °C), Min:97 6 °F (36 4 °C), Max:98 °F (36 7 °C)    Temp:  [97 6 °F (36 4 °C)-98 °F (36 7 °C)] 98 °F (36 7 °C)  HR:  [75-89] 75  Resp:  [20] 20  BP: (135-164)/(60-82) 148/65  SpO2:  [94 %-98 %] 96 %  Body mass index is 24 86 kg/m²  Input and Output Summary (last 24 hours): Intake/Output Summary (Last 24 hours) at 2023 1245  Last data filed at 2023 0531  Gross per 24 hour   Intake 600 ml   Output --   Net 600 ml       Physical Exam:    Constitutional: Patient is oriented to person, place and time, no acute distress  HEENT:  Normocephalic, atraumatic  Cardiovascular: Normal S1S2, RRR, No murmurs/rubs/gallops appreciated  Pulmonary:  Bilateral air entry, No rhonchi/rales/wheezing appreciated  Abdominal: Soft, Bowel sounds present, Non-tender, Non-distended  Extremities: Bilateral AKA, right AKA with wound VAC in place  Neurological: Awake, alert  Skin:  Warm, dry    Additional Data:     Labs:    Results from last 7 days   Lab Units 22  0455   WBC Thousand/uL 8 29   HEMOGLOBIN g/dL 7 9*   HEMATOCRIT % 27 0*   PLATELETS Thousands/uL 370   NEUTROS PCT % 59   LYMPHS PCT % 23   MONOS PCT % 9   EOS PCT % 6           Invalid input(s): LABALBU  Results from last 7 days   Lab Units 22  0527   INR  2 56*        I Have Reviewed All Lab Data Listed Above  Invasive Devices     Peripheral Intravenous Line  Duration           Peripheral IV 22 Dorsal (posterior); Left Forearm 3 days                     Recent Cultures (last 7 days):           Last 24 Hours Medication List:   Current Facility-Administered Medications   Medication Dose Route Frequency Provider Last Rate   • acetaminophen  650 mg Oral Q6H PRN Danny Sierra DO     • ALPRAZolam  0 25 mg Oral Daily PRN An Mo, DO     • aluminum-magnesium hydroxide-simethicone  30 mL Oral Q4H PRN An Mo, DO     • amLODIPine  10 mg Oral Daily An Mo, DO     • atorvastatin  40 mg Oral Daily With [address], DO     • buPROPion  150 mg Oral Daily An Mo, DO     • clopidogrel  75 mg Oral Daily An Mo, DO     • collagenase   Topical Daily An Mo, DO     • doxazosin  4 mg Oral HS An Mo, DO     • doxycycline hyclate  100 mg Oral Q12H Sagrario Paul MD     • ferrous sulfate  325 mg Oral Daily With Lunch Calogero Reba, DO     • gabapentin  400 mg Oral TID An Mo, DO     • insulin glargine  10 Units Subcutaneous HS An Mo, DO     • insulin lispro  1-6 Units Subcutaneous TID AC An Mo, DO     • labetalol  10 mg Intravenous Q6H PRN An Mo, DO     • lisinopril  40 mg Oral Daily An Mo, DO     • methocarbamol  1,000 mg Oral [facility] An Mo, DO     • metoclopramide  10 mg Intravenous Q6H PRN An Mo, DO     • metoprolol succinate  100 mg Oral Daily Moreno Franklin MD     • GINA ANTIFUNGAL   Topical BID Ramona Monreal MD     • naloxone  0 04 mg Intravenous Q1MIN PRN An Mo, DO     • ondansetron  4 mg Intravenous Q6H PRN An Mo, DO     • oxyCODONE  10 mg Oral Q12H [address] An Mo, DO     • oxyCODONE  2 5 mg Oral Q4H PRN An Mo, DO      Or   • oxyCODONE  5 mg Oral Q4H PRN An Mo, DO      Or   • oxyCODONE  7 5 mg Oral Q4H PRN An Mo, DO     • pantoprazole  40 mg Oral Early Morning An Mo, DO     • polyethylene glycol  17 g Oral BID An Mo, DO     • potassium chloride  20 mEq Oral Daily Moreno Franklin MD     • QUEtiapine  25 mg Oral HS An Mo, DO     • senna  1 tablet Oral BID An Mo, DO     • sertraline  175 mg Oral Daily Angelica Martines DO     • warfarin  3 mg Oral Daily (warfarin) Domenic Santa MD          Today, Patient Was Seen By: Domenic Santa MD

## 2023-01-02 LAB
GLUCOSE SERPL-MCNC: 102 MG/DL (ref 65–140)
GLUCOSE SERPL-MCNC: 136 MG/DL (ref 65–140)
GLUCOSE SERPL-MCNC: 157 MG/DL (ref 65–140)
GLUCOSE SERPL-MCNC: 178 MG/DL (ref 65–140)
INR PPP: 1.65 (ref 0.84–1.19)
PROTHROMBIN TIME: 19.5 SECONDS (ref 11.6–14.5)

## 2023-01-02 PROCEDURE — 99024 POSTOP FOLLOW-UP VISIT: CPT | Performed by: SURGERY

## 2023-01-02 PROCEDURE — 82948 REAGENT STRIP/BLOOD GLUCOSE: CPT

## 2023-01-02 PROCEDURE — 85610 PROTHROMBIN TIME: CPT | Performed by: INTERNAL MEDICINE

## 2023-01-02 RX ORDER — WARFARIN SODIUM 5 MG/1
5 TABLET ORAL
Status: DISCONTINUED | OUTPATIENT
Start: 2023-01-03 | End: 2023-01-12 | Stop reason: HOSPADM

## 2023-01-02 RX ORDER — HYDROMORPHONE HCL/PF 1 MG/ML
0.5 SYRINGE (ML) INJECTION ONCE
Status: COMPLETED | OUTPATIENT
Start: 2023-01-02 | End: 2023-01-02

## 2023-01-02 RX ADMIN — DOXYCYCLINE 100 MG: 100 CAPSULE ORAL at 08:36

## 2023-01-02 RX ADMIN — SERTRALINE HYDROCHLORIDE 175 MG: 100 TABLET ORAL at 08:36

## 2023-01-02 RX ADMIN — GABAPENTIN 400 MG: 400 CAPSULE ORAL at 08:37

## 2023-01-02 RX ADMIN — FERROUS SULFATE TAB 325 MG (65 MG ELEMENTAL FE) 325 MG: 325 (65 FE) TAB at 11:33

## 2023-01-02 RX ADMIN — QUETIAPINE FUMARATE 25 MG: 25 TABLET ORAL at 21:52

## 2023-01-02 RX ADMIN — INSULIN GLARGINE 10 UNITS: 100 INJECTION, SOLUTION SUBCUTANEOUS at 21:52

## 2023-01-02 RX ADMIN — HYDROMORPHONE HYDROCHLORIDE 0.5 MG: 1 INJECTION, SOLUTION INTRAMUSCULAR; INTRAVENOUS; SUBCUTANEOUS at 09:32

## 2023-01-02 RX ADMIN — WARFARIN SODIUM 7.5 MG: 5 TABLET ORAL at 16:59

## 2023-01-02 RX ADMIN — BUPROPION HYDROCHLORIDE 150 MG: 150 TABLET, FILM COATED, EXTENDED RELEASE ORAL at 08:37

## 2023-01-02 RX ADMIN — POTASSIUM CHLORIDE 20 MEQ: 1500 TABLET, EXTENDED RELEASE ORAL at 08:37

## 2023-01-02 RX ADMIN — METHOCARBAMOL 1000 MG: 500 TABLET ORAL at 13:30

## 2023-01-02 RX ADMIN — ATORVASTATIN CALCIUM 40 MG: 40 TABLET, FILM COATED ORAL at 16:58

## 2023-01-02 RX ADMIN — OXYCODONE HYDROCHLORIDE 10 MG: 10 TABLET, FILM COATED, EXTENDED RELEASE ORAL at 08:36

## 2023-01-02 RX ADMIN — GABAPENTIN 400 MG: 400 CAPSULE ORAL at 16:58

## 2023-01-02 RX ADMIN — INSULIN LISPRO 1 UNITS: 100 INJECTION, SOLUTION INTRAVENOUS; SUBCUTANEOUS at 16:58

## 2023-01-02 RX ADMIN — MICONAZOLE NITRATE: 20 CREAM TOPICAL at 17:02

## 2023-01-02 RX ADMIN — METOPROLOL SUCCINATE 100 MG: 100 TABLET, FILM COATED, EXTENDED RELEASE ORAL at 08:36

## 2023-01-02 RX ADMIN — MICONAZOLE NITRATE: 20 CREAM TOPICAL at 08:38

## 2023-01-02 RX ADMIN — OXYCODONE 5 MG: 5 TABLET ORAL at 16:59

## 2023-01-02 RX ADMIN — PANTOPRAZOLE SODIUM 40 MG: 40 TABLET, DELAYED RELEASE ORAL at 05:54

## 2023-01-02 RX ADMIN — METHOCARBAMOL 1000 MG: 500 TABLET ORAL at 05:54

## 2023-01-02 RX ADMIN — LISINOPRIL 40 MG: 20 TABLET ORAL at 08:37

## 2023-01-02 RX ADMIN — METHOCARBAMOL 1000 MG: 500 TABLET ORAL at 21:52

## 2023-01-02 RX ADMIN — CLOPIDOGREL BISULFATE 75 MG: 75 TABLET ORAL at 08:36

## 2023-01-02 RX ADMIN — DOXAZOSIN 4 MG: 4 TABLET ORAL at 21:53

## 2023-01-02 RX ADMIN — OXYCODONE HYDROCHLORIDE 10 MG: 10 TABLET, FILM COATED, EXTENDED RELEASE ORAL at 21:52

## 2023-01-02 RX ADMIN — GABAPENTIN 400 MG: 400 CAPSULE ORAL at 21:52

## 2023-01-02 NOTE — CASE MANAGEMENT
Case Management Progress Note    Patient name Clemon Dance  Location [facility] [facility] 4 /E4 [facility]-* MRN [id number]  : 1960 Date 2023       LOS (days): 72  Geometric Mean LOS (GMLOS) (days):   Days to GMLOS:        OBJECTIVE:        Current admission status: Inpatient  Preferred Pharmacy:   [id number], [address] [place] [address] [address]  [address] [place] [address]  [facility] [address]  Phone: [phone] Fax: [phone]    Primary Care Provider: ALEX Palm    Primary Insurance: BLUE CROSS  Secondary Insurance: [place] [employer]    PROGRESS NOTE:  CM received VM from daughter Palmira Fink requesting update  CM called Palmira Fink [facility] questioning why accepting facility changed from [facility] to [address] [place], [place] [address]   CM stated it likely had to do with patient's change of insurance or bed availability, but would call [facility] to confirm  CM spoke with Sarah Caponesina ([phone]) via phone  Amena Shepherd stated different [facility] locations accept different insurances and requested that the new insurance information be sent to Amena Shepherd via email ([email or web address])  CM searched through pt's file and could not find updated insurance numbers for pt's new policy as of 90  Per chart review, pt has MA plan with [employer] from 23-23 then [facility] starting 1/15/23  CM requested insurance cards/numbers from Palmira Fink who reported she already provided to ongoing  and [facility] facility; however, would send again once home from work  UPDATE: Palmira Fink reported she does not have physical insurance cards  The only number she has is pt's Access ID #[id number]  CM provided information to [address]

## 2023-01-02 NOTE — PLAN OF CARE
Problem: Potential for Falls  Goal: Patient will remain free of falls  Description: INTERVENTIONS:  - Educate patient/family on patient safety including physical limitations  - Instruct patient to call for assistance with activity   - Consult OT/PT to assist with strengthening/mobility   - Keep Call bell within reach  - Keep bed low and locked with side rails adjusted as appropriate  - Keep care items and personal belongings within reach  - Initiate and maintain comfort rounds  - Make Fall Risk Sign visible to staff  - Offer Toileting every 2 Hours, in advance of need  - Initiate/Maintain bed  chair alarm  - Obtain necessary fall risk management equipment: bed chair alarm, call bell, gripper sock, walker, bedside commode  - Apply yellow socks and bracelet for high fall risk patients  - Consider moving patient to room near nurses station  Outcome: Progressing     Problem: MOBILITY - ADULT  Goal: Maintain or return to baseline ADL function  Description: INTERVENTIONS:  -  Assess patient's ability to carry out ADLs; assess patient's baseline for ADL function and identify physical deficits which impact ability to perform ADLs (bathing, care of mouth/teeth, toileting, grooming, dressing, etc )  - Assess/evaluate cause of self-care deficits   - Assess range of motion  - Assess patient's mobility; develop plan if impaired  - Assess patient's need for assistive devices and provide as appropriate  - Encourage maximum independence but intervene and supervise when necessary  - Involve family in performance of ADLs  - Assess for home care needs following discharge   - Consider OT consult to assist with ADL evaluation and planning for discharge  - Provide patient education as appropriate  Outcome: Progressing  Goal: Maintains/Returns to pre admission functional level  Description: INTERVENTIONS:  - Perform BMAT or MOVE assessment daily    - Set and communicate daily mobility goal to care team and patient/family/caregiver     - Collaborate with rehabilitation services on mobility goals if consulted  - Assist patient in repositioning every 2 hours    - Dangle patient 3 times a day  - Stand patient 3 times a day  - Out of bed to chair as tolerated  - Out of bed for meals  - Out of bed for toileting  - Record patient progress and toleration of activity level   Outcome: Progressing     Problem: PAIN - ADULT  Goal: Verbalizes/displays adequate comfort level or baseline comfort level  Description: Interventions:  - Encourage patient to monitor pain and request assistance  - Assess pain using appropriate pain scale  - Administer analgesics based on type and severity of pain and evaluate response  - Implement non-pharmacological measures as appropriate and evaluate response  - Consider cultural and social influences on pain and pain management  - Notify physician/advanced practitioner if interventions unsuccessful or patient reports new pain  Outcome: Progressing     Problem: INFECTION - ADULT  Goal: Absence or prevention of progression during hospitalization  Description: INTERVENTIONS:  - Assess and monitor for signs and symptoms of infection  - Monitor lab/diagnostic results  - Monitor all insertion sites, i e  indwelling lines, tubes, and drains  - Administer medications as ordered  - Instruct and encourage patient and family to use good hand hygiene technique  Outcome: Progressing     Problem: SAFETY ADULT  Goal: Patient will remain free of falls  Description: INTERVENTIONS:  - Educate patient/family on patient safety including physical limitations  - Instruct patient to call for assistance with activity   - Consult OT/PT to assist with strengthening/mobility   - Keep Call bell within reach  - Keep bed low and locked with side rails adjusted as appropriate  - Keep care items and personal belongings within reach  - Initiate and maintain comfort rounds  - Make Fall Risk Sign visible to staff  - Offer Toileting every 2 Hours, in advance of need  - Initiate/Maintain bed  chair alarm  - Obtain necessary fall risk management equipment: bed chair alarm, call bell, gripper sock, walker, bedside commode  - Apply yellow socks and bracelet for high fall risk patients  - Consider moving patient to room near nurses station  Outcome: Progressing  Goal: Maintain or return to baseline ADL function  Description: INTERVENTIONS:  -  Assess patient's ability to carry out ADLs; assess patient's baseline for ADL function and identify physical deficits which impact ability to perform ADLs (bathing, care of mouth/teeth, toileting, grooming, dressing, etc )  - Assess/evaluate cause of self-care deficits   - Assess range of motion  - Assess patient's mobility; develop plan if impaired  - Assess patient's need for assistive devices and provide as appropriate  - Encourage maximum independence but intervene and supervise when necessary  - Involve family in performance of ADLs  - Assess for home care needs following discharge   - Consider OT consult to assist with ADL evaluation and planning for discharge  - Provide patient education as appropriate  Outcome: Progressing  Goal: Maintains/Returns to pre admission functional level  Description: INTERVENTIONS:  - Perform BMAT or MOVE assessment daily    - Set and communicate daily mobility goal to care team and patient/family/caregiver  - Collaborate with rehabilitation services on mobility goals if consulted  - Assist patient in repositioning every 2 hours    - Dangle patient 3 times a day  - Stand patient 3 times a day  - Out of bed to chair as tolerated  - Out of bed for meals  - Out of bed for toileting  - Record patient progress and toleration of activity level   Outcome: Progressing     Problem: DISCHARGE PLANNING  Goal: Discharge to home or other facility with appropriate resources  Description: INTERVENTIONS:  - Identify barriers to discharge w/patient   - Arrange for needed discharge resources and transportation as appropriate  - Identify discharge learning needs  - Refer to Case Management Department for coordinating discharge planning if the patient needs post-hospital services based on physician/advanced practitioner order   Outcome: Progressing     Problem: Knowledge Deficit  Goal: Patient/family/caregiver demonstrates understanding of disease process, treatment plan, medications, and discharge instructions  Description: Complete learning assessment and assess knowledge base    Interventions:  - Provide teaching at level of understanding  - Provide teaching via preferred learning methods  Outcome: Progressing     Problem: METABOLIC, FLUID AND ELECTROLYTES - ADULT  Goal: Electrolytes maintained within normal limits  Description: INTERVENTIONS:  - Monitor labs and assess patient for signs and symptoms of electrolyte imbalances  - Administer electrolyte replacement as ordered  - Monitor response to electrolyte replacements, including repeat lab results as appropriate  - Instruct patient on fluid and nutrition as appropriate  Outcome: Progressing  Goal: Fluid balance maintained  Description: INTERVENTIONS:  - Monitor labs   - Monitor I/O and WT  - Instruct patient on fluid and nutrition as appropriate  - Assess for signs & symptoms of volume excess or deficit  Outcome: Progressing  Goal: Glucose maintained within target range  Description: INTERVENTIONS:  - Monitor Blood Glucose as ordered  - Assess for signs and symptoms of hyperglycemia and hypoglycemia  - Administer ordered medications to maintain glucose within target range  - Assess nutritional intake and initiate nutrition service referral as needed  Outcome: Progressing     Problem: SKIN/TISSUE INTEGRITY - ADULT  Goal: Skin Integrity remains intact(Skin Breakdown Prevention)  Description: Assess:  -Perform Nicanor assessment every shift  -Clean and moisturize skin every shift  -Inspect skin when repositioning, toileting, and assisting with ADLS  -Assess under medical devices   -Assess extremities for adequate circulation and sensation     Bed Management:  -Have minimal linens on bed & keep smooth, unwrinkled  -Change linens as needed when moist or perspiring  -Avoid sitting or lying in one position for more than 2 hours while in bed    Toileting:  -Offer bedside commode  -Assess for incontinence every 2 hours  -Use incontinent care products after each incontinent episode     Activity:  -Mobilize patient 3 times a day  -Encourage or provide ROM exercises   -Turn and reposition patient every 2 Hours  -Use appropriate equipment to lift or move patient in bed  -Instruct/ Assist with weight shifting every 15 minutes when out of bed in chair  -Consider limitation of chair time 1 hour intervals    Skin Care:  -Avoid use of baby powder, tape, friction and shearing, hot water or constrictive clothing  -Relieve pressure over bony prominences using waffle cushion when in chair  -Do not massage red bony areas    Outcome: Progressing  Goal: Incision(s), wounds(s) or drain site(s) healing without S/S of infection  Description: INTERVENTIONS  - Assess and document dressing, incision, wound bed, drain sites and surrounding tissue  - Provide patient and family education  - Perform skin care/dressing changes everyday as ordered  Outcome: Progressing  Goal: Pressure injury heals and does not worsen  Description: Interventions:  - Implement low air loss mattress or specialty surface (Criteria met)  - Apply silicone foam dressing  - Instruct/assist with weight shifting every 15 minutes when in chair   - Limit chair time to 1 hour intervals  - Use special pressure reducing interventions such as waffle cushion when in chair   - Perform passive or active ROM   - Turn and reposition patient & offload bony prominences every 2 hours   - Utilize friction reducing device or surface for transfers   - Consider consults to  interdisciplinary teams such as wound care, PT/OT  - Use incontinent care products after each incontinent episode   - Consider nutrition services referral as needed  Outcome: Progressing     Problem: MUSCULOSKELETAL - ADULT  Goal: Maintain or return mobility to safest level of function  Description: INTERVENTIONS:  - Assess patient's ability to carry out ADLs; assess patient's baseline for ADL function and identify physical deficits which impact ability to perform ADLs (bathing, care of mouth/teeth, toileting, grooming, dressing, etc )  - Assess/evaluate cause of self-care deficits   - Assess range of motion  - Assess patient's mobility  - Assess patient's need for assistive devices and provide as appropriate  - Encourage maximum independence but intervene and supervise when necessary  - Involve family in performance of ADLs  - Assess for home care needs following discharge   - Consider OT consult to assist with ADL evaluation and planning for discharge  - Provide patient education as appropriate  Outcome: Progressing  Goal: Maintain proper alignment of affected body part  Description: INTERVENTIONS:  - Support, maintain and protect limb and body alignment  - Provide patient/ family with appropriate education  Outcome: Progressing     Problem: CARDIOVASCULAR - ADULT  Goal: Maintains optimal cardiac output and hemodynamic stability  Description: INTERVENTIONS:  - Monitor I/O, vital signs and rhythm  - Monitor for S/S and trends of decreased cardiac output  - Administer and titrate ordered vasoactive medications to optimize hemodynamic stability  - Assess quality of pulses, skin color and temperature  - Assess for signs of decreased coronary artery perfusion  - Instruct patient to report change in severity of symptoms  Outcome: Progressing     Problem: GENITOURINARY - ADULT  Goal: Maintains or returns to baseline urinary function  Description: INTERVENTIONS:  - Assess urinary function  - Encourage oral fluids to ensure adequate hydration if ordered  - Administer IV fluids as ordered to ensure adequate hydration  - Administer ordered medications as needed  - Offer frequent toileting  - Follow urinary retention protocol if ordered  Outcome: Progressing  Goal: Absence of urinary retention  Description: INTERVENTIONS:  - Assess patient’s ability to void and empty bladder  - Monitor I/O  - Bladder scan as needed  - Discuss with physician/AP medications to alleviate retention as needed  - Discuss catheterization for long term situations as appropriate  Outcome: Progressing     Problem: HEMATOLOGIC - ADULT  Goal: Maintains hematologic stability  Description: INTERVENTIONS  - Assess for signs and symptoms of bleeding or hemorrhage  - Monitor labs  - Administer supportive blood products/factors as ordered and appropriate  Outcome: Progressing     Problem: Prexisting or High Potential for Compromised Skin Integrity  Goal: Skin integrity is maintained or improved  Description: INTERVENTIONS:  - Identify patients at risk for skin breakdown  - Assess and monitor skin integrity  - Assess and monitor nutrition and hydration status  - Monitor labs   - Assess for incontinence   - Turn and reposition patient  - Assist with mobility/ambulation  - Relieve pressure over bony prominences  - Avoid friction and shearing  - Provide appropriate hygiene as needed including keeping skin clean and dry  - Evaluate need for skin moisturizer/barrier cream  - Collaborate with interdisciplinary team   - Patient/family teaching  - Consider wound care consult   Outcome: Progressing

## 2023-01-02 NOTE — ASSESSMENT & PLAN NOTE
65year old female former smoker w/HTN, HLD, uncontrolled type II DM (A1c 9 8), hx CVA, presenting with nonhealing extensive L heel ulceration and R hallux and 5th digit ulceration  She underwent multiple endovascular interventions and surgeries  She is now S/P bilateral AKA's  S/p multiple b/l endovascular interventions  JUSTIN  R hemispheric CVA    S/p L AKA 11/23/22 [facility])  S/p R AKA, wound debridement 12/1/22 [facility])  S/p R AKA wound/stump washout and VAC placement 12/16/22 [facility]  S/p R AKA wound/stump VAC change, L AKA stump washout/packing 12/19/22 ([facility])  Wound Cx: enterobacter cloacae                         Recommendations:  - Bilateral tissue loss in the setting of uncontrolled type II DM and NYDIA on admission, now s/p bilateral AKA's as above followed by wound debridements  - Endovascular interventions, c/b atheroembolic event to the RLE and JUSTIN with R hemispheric CVA  - Local wound care to bilateral AKA's    • R wounds cleansed with saline; apply wet to dry dressing and cover with ABD, Rachid and ABD    • R medial thigh apply moisturizer to skin    • L AKA was with saline; peroxide and leave to open air   - Appreciate ID for assistance with abx- L stump cultures growing out enterobacter cloacae    Patient completed doxycycline on 1/2/23  - Incentive spirometer   - Continue statin, Plavix and warfarin; daily INR and dosage of warfarin  - Medical management and glucose control per SLIM  - Pain management as per SLIM  - Appreciate psych input and management - addition of wellbutrin and adjustment of zoloft   - Disposition planning for long-term care placement; CM is working on placement which should be ready in the next 2 days  - D/w Dr Ashli Griffin

## 2023-01-02 NOTE — QUICK NOTE
Wound vac removed at bedside  The bed of the wounds appear healthy with good granulation tissue and bleeding tissue  Mesalt placed in wound beds, covered with telfa and ABD pad, wrapped lightly with kerlix and Ace bandage  No further wound vac therapy required

## 2023-01-02 NOTE — PLAN OF CARE
Problem: Potential for Falls  Goal: Patient will remain free of falls  Description: INTERVENTIONS:  - Educate patient/family on patient safety including physical limitations  - Instruct patient to call for assistance with activity   - Consult OT/PT to assist with strengthening/mobility   - Keep Call bell within reach  - Keep bed low and locked with side rails adjusted as appropriate  - Keep care items and personal belongings within reach  - Initiate and maintain comfort rounds  - Make Fall Risk Sign visible to staff  - Offer Toileting every 2 Hours, in advance of need  - Initiate/Maintain bed  chair alarm  - Obtain necessary fall risk management equipment: bed chair alarm, call bell, gripper sock, walker, bedside commode  - Apply yellow socks and bracelet for high fall risk patients  - Consider moving patient to room near nurses station  Outcome: Progressing     Problem: MOBILITY - ADULT  Goal: Maintain or return to baseline ADL function  Description: INTERVENTIONS:  -  Assess patient's ability to carry out ADLs; assess patient's baseline for ADL function and identify physical deficits which impact ability to perform ADLs (bathing, care of mouth/teeth, toileting, grooming, dressing, etc )  - Assess/evaluate cause of self-care deficits   - Assess range of motion  - Assess patient's mobility; develop plan if impaired  - Assess patient's need for assistive devices and provide as appropriate  - Encourage maximum independence but intervene and supervise when necessary  - Involve family in performance of ADLs  - Assess for home care needs following discharge   - Consider OT consult to assist with ADL evaluation and planning for discharge  - Provide patient education as appropriate  Outcome: Progressing  Goal: Maintains/Returns to pre admission functional level  Description: INTERVENTIONS:  - Perform BMAT or MOVE assessment daily    - Set and communicate daily mobility goal to care team and patient/family/caregiver     - Collaborate with rehabilitation services on mobility goals if consulted  - Assist patient in repositioning every 2 hours    - Dangle patient 3 times a day  - Stand patient 3 times a day  - Out of bed to chair as tolerated  - Out of bed for meals  - Out of bed for toileting  - Record patient progress and toleration of activity level   Outcome: Progressing     Problem: PAIN - ADULT  Goal: Verbalizes/displays adequate comfort level or baseline comfort level  Description: Interventions:  - Encourage patient to monitor pain and request assistance  - Assess pain using appropriate pain scale  - Administer analgesics based on type and severity of pain and evaluate response  - Implement non-pharmacological measures as appropriate and evaluate response  - Consider cultural and social influences on pain and pain management  - Notify physician/advanced practitioner if interventions unsuccessful or patient reports new pain  Outcome: Progressing     Problem: INFECTION - ADULT  Goal: Absence or prevention of progression during hospitalization  Description: INTERVENTIONS:  - Assess and monitor for signs and symptoms of infection  - Monitor lab/diagnostic results  - Monitor all insertion sites, i e  indwelling lines, tubes, and drains  - Administer medications as ordered  - Instruct and encourage patient and family to use good hand hygiene technique  Outcome: Progressing     Problem: SAFETY ADULT  Goal: Patient will remain free of falls  Description: INTERVENTIONS:  - Educate patient/family on patient safety including physical limitations  - Instruct patient to call for assistance with activity   - Consult OT/PT to assist with strengthening/mobility   - Keep Call bell within reach  - Keep bed low and locked with side rails adjusted as appropriate  - Keep care items and personal belongings within reach  - Initiate and maintain comfort rounds  - Make Fall Risk Sign visible to staff  - Offer Toileting every 2 Hours, in advance of need  - Initiate/Maintain bed  chair alarm  - Obtain necessary fall risk management equipment: bed chair alarm, call bell, gripper sock, walker, bedside commode  - Apply yellow socks and bracelet for high fall risk patients  - Consider moving patient to room near nurses station  Outcome: Progressing  Goal: Maintain or return to baseline ADL function  Description: INTERVENTIONS:  -  Assess patient's ability to carry out ADLs; assess patient's baseline for ADL function and identify physical deficits which impact ability to perform ADLs (bathing, care of mouth/teeth, toileting, grooming, dressing, etc )  - Assess/evaluate cause of self-care deficits   - Assess range of motion  - Assess patient's mobility; develop plan if impaired  - Assess patient's need for assistive devices and provide as appropriate  - Encourage maximum independence but intervene and supervise when necessary  - Involve family in performance of ADLs  - Assess for home care needs following discharge   - Consider OT consult to assist with ADL evaluation and planning for discharge  - Provide patient education as appropriate  Outcome: Progressing  Goal: Maintains/Returns to pre admission functional level  Description: INTERVENTIONS:  - Perform BMAT or MOVE assessment daily    - Set and communicate daily mobility goal to care team and patient/family/caregiver  - Collaborate with rehabilitation services on mobility goals if consulted  - Assist patient in repositioning every 2 hours    - Dangle patient 3 times a day  - Stand patient 3 times a day  - Out of bed to chair as tolerated  - Out of bed for meals  - Out of bed for toileting  - Record patient progress and toleration of activity level   Outcome: Progressing     Problem: DISCHARGE PLANNING  Goal: Discharge to home or other facility with appropriate resources  Description: INTERVENTIONS:  - Identify barriers to discharge w/patient   - Arrange for needed discharge resources and transportation as appropriate  - Identify discharge learning needs  - Refer to Case Management Department for coordinating discharge planning if the patient needs post-hospital services based on physician/advanced practitioner order   Outcome: Progressing     Problem: Knowledge Deficit  Goal: Patient/family/caregiver demonstrates understanding of disease process, treatment plan, medications, and discharge instructions  Description: Complete learning assessment and assess knowledge base    Interventions:  - Provide teaching at level of understanding  - Provide teaching via preferred learning methods  Outcome: Progressing     Problem: METABOLIC, FLUID AND ELECTROLYTES - ADULT  Goal: Electrolytes maintained within normal limits  Description: INTERVENTIONS:  - Monitor labs and assess patient for signs and symptoms of electrolyte imbalances  - Administer electrolyte replacement as ordered  - Monitor response to electrolyte replacements, including repeat lab results as appropriate  - Instruct patient on fluid and nutrition as appropriate  Outcome: Progressing  Goal: Fluid balance maintained  Description: INTERVENTIONS:  - Monitor labs   - Monitor I/O and WT  - Instruct patient on fluid and nutrition as appropriate  - Assess for signs & symptoms of volume excess or deficit  Outcome: Progressing  Goal: Glucose maintained within target range  Description: INTERVENTIONS:  - Monitor Blood Glucose as ordered  - Assess for signs and symptoms of hyperglycemia and hypoglycemia  - Administer ordered medications to maintain glucose within target range  - Assess nutritional intake and initiate nutrition service referral as needed  Outcome: Progressing     Problem: SKIN/TISSUE INTEGRITY - ADULT  Goal: Skin Integrity remains intact(Skin Breakdown Prevention)  Description: Assess:  -Perform Nicanor assessment every shift  -Clean and moisturize skin every shift  -Inspect skin when repositioning, toileting, and assisting with ADLS  -Assess under medical devices   -Assess extremities for adequate circulation and sensation     Bed Management:  -Have minimal linens on bed & keep smooth, unwrinkled  -Change linens as needed when moist or perspiring  -Avoid sitting or lying in one position for more than 2 hours while in bed    Toileting:  -Offer bedside commode  -Assess for incontinence every 2 hours  -Use incontinent care products after each incontinent episode     Activity:  -Mobilize patient 3 times a day  -Encourage or provide ROM exercises   -Turn and reposition patient every 2 Hours  -Use appropriate equipment to lift or move patient in bed  -Instruct/ Assist with weight shifting every 15 minutes when out of bed in chair  -Consider limitation of chair time 1 hour intervals    Skin Care:  -Avoid use of baby powder, tape, friction and shearing, hot water or constrictive clothing  -Relieve pressure over bony prominences using waffle cushion when in chair  -Do not massage red bony areas    Outcome: Progressing  Goal: Incision(s), wounds(s) or drain site(s) healing without S/S of infection  Description: INTERVENTIONS  - Assess and document dressing, incision, wound bed, drain sites and surrounding tissue  - Provide patient and family education  - Perform skin care/dressing changes everyday as ordered  Outcome: Progressing  Goal: Pressure injury heals and does not worsen  Description: Interventions:  - Implement low air loss mattress or specialty surface (Criteria met)  - Apply silicone foam dressing  - Instruct/assist with weight shifting every 15 minutes when in chair   - Limit chair time to 1 hour intervals  - Use special pressure reducing interventions such as waffle cushion when in chair   - Perform passive or active ROM   - Turn and reposition patient & offload bony prominences every 2 hours   - Utilize friction reducing device or surface for transfers   - Consider consults to  interdisciplinary teams such as wound care, PT/OT  - Use incontinent care products after each incontinent episode   - Consider nutrition services referral as needed  Outcome: Progressing     Problem: MUSCULOSKELETAL - ADULT  Goal: Maintain or return mobility to safest level of function  Description: INTERVENTIONS:  - Assess patient's ability to carry out ADLs; assess patient's baseline for ADL function and identify physical deficits which impact ability to perform ADLs (bathing, care of mouth/teeth, toileting, grooming, dressing, etc )  - Assess/evaluate cause of self-care deficits   - Assess range of motion  - Assess patient's mobility  - Assess patient's need for assistive devices and provide as appropriate  - Encourage maximum independence but intervene and supervise when necessary  - Involve family in performance of ADLs  - Assess for home care needs following discharge   - Consider OT consult to assist with ADL evaluation and planning for discharge  - Provide patient education as appropriate  Outcome: Progressing  Goal: Maintain proper alignment of affected body part  Description: INTERVENTIONS:  - Support, maintain and protect limb and body alignment  - Provide patient/ family with appropriate education  Outcome: Progressing     Problem: CARDIOVASCULAR - ADULT  Goal: Maintains optimal cardiac output and hemodynamic stability  Description: INTERVENTIONS:  - Monitor I/O, vital signs and rhythm  - Monitor for S/S and trends of decreased cardiac output  - Administer and titrate ordered vasoactive medications to optimize hemodynamic stability  - Assess quality of pulses, skin color and temperature  - Assess for signs of decreased coronary artery perfusion  - Instruct patient to report change in severity of symptoms  Outcome: Progressing     Problem: RESPIRATORY - ADULT  Goal: Achieves optimal ventilation and oxygenation  Description: INTERVENTIONS:  - Assess for changes in respiratory status  - Assess for changes in mentation and behavior  - Position to facilitate oxygenation and minimize respiratory effort  - Oxygen administered by appropriate delivery if ordered  - Initiate smoking cessation education as indicated  - Encourage broncho-pulmonary hygiene including cough, deep breathe, Incentive Spirometry  - Assess the need for suctioning and aspirate as needed  - Assess and instruct to report SOB or any respiratory difficulty  - Respiratory Therapy support as indicated  Outcome: Progressing     Problem: GENITOURINARY - ADULT  Goal: Maintains or returns to baseline urinary function  Description: INTERVENTIONS:  - Assess urinary function  - Encourage oral fluids to ensure adequate hydration if ordered  - Administer IV fluids as ordered to ensure adequate hydration  - Administer ordered medications as needed  - Offer frequent toileting  - Follow urinary retention protocol if ordered  Outcome: Progressing  Goal: Absence of urinary retention  Description: INTERVENTIONS:  - Assess patient’s ability to void and empty bladder  - Monitor I/O  - Bladder scan as needed  - Discuss with physician/AP medications to alleviate retention as needed  - Discuss catheterization for long term situations as appropriate  Outcome: Progressing     Problem: HEMATOLOGIC - ADULT  Goal: Maintains hematologic stability  Description: INTERVENTIONS  - Assess for signs and symptoms of bleeding or hemorrhage  - Monitor labs  - Administer supportive blood products/factors as ordered and appropriate  Outcome: Progressing     Problem: Prexisting or High Potential for Compromised Skin Integrity  Goal: Skin integrity is maintained or improved  Description: INTERVENTIONS:  - Identify patients at risk for skin breakdown  - Assess and monitor skin integrity  - Assess and monitor nutrition and hydration status  - Monitor labs   - Assess for incontinence   - Turn and reposition patient  - Assist with mobility/ambulation  - Relieve pressure over bony prominences  - Avoid friction and shearing  - Provide appropriate hygiene as needed including keeping skin clean and dry  - Evaluate need for skin moisturizer/barrier cream  - Collaborate with interdisciplinary team   - Patient/family teaching  - Consider wound care consult   Outcome: Progressing     Problem: Nutrition/Hydration-ADULT  Goal: Nutrient/Hydration intake appropriate for improving, restoring or maintaining nutritional needs  Description: Monitor and assess patient's nutrition/hydration status for malnutrition  Collaborate with interdisciplinary team and initiate plan and interventions as ordered  Monitor patient's weight and dietary intake as ordered or per policy  Utilize nutrition screening tool and intervene as necessary  Determine patient's food preferences and provide high-protein, high-caloric foods as appropriate       INTERVENTIONS:  - Monitor oral intake, urinary output, labs, and treatment plans  - Assess nutrition and hydration status and recommend course of action  - Evaluate amount of meals eaten  - Assist patient with eating if necessary   - Allow adequate time for meals  - Recommend/ encourage appropriate diets, oral nutritional supplements, and vitamin/mineral supplements  - Order, calculate, and assess calorie counts as needed  - Assess need for intravenous fluids  - Provide nutrition/hydration education as appropriate  - Include patient/family/caregiver in decisions related to nutrition  Outcome: Progressing

## 2023-01-03 ENCOUNTER — VBI (OUTPATIENT)
Dept: ADMINISTRATIVE | Facility: OTHER | Age: 63
End: 2023-01-03

## 2023-01-03 LAB
GLUCOSE SERPL-MCNC: 137 MG/DL (ref 65–140)
GLUCOSE SERPL-MCNC: 159 MG/DL (ref 65–140)
GLUCOSE SERPL-MCNC: 92 MG/DL (ref 65–140)
GLUCOSE SERPL-MCNC: 92 MG/DL (ref 65–140)
INR PPP: 1.76 (ref 0.84–1.19)
PROTHROMBIN TIME: 20.4 SECONDS (ref 11.6–14.5)

## 2023-01-03 PROCEDURE — 82948 REAGENT STRIP/BLOOD GLUCOSE: CPT

## 2023-01-03 PROCEDURE — NC001 PR NO CHARGE: Performed by: SURGERY

## 2023-01-03 PROCEDURE — 85610 PROTHROMBIN TIME: CPT

## 2023-01-03 PROCEDURE — 97168 OT RE-EVAL EST PLAN CARE: CPT

## 2023-01-03 RX ADMIN — MICONAZOLE NITRATE: 20 CREAM TOPICAL at 10:47

## 2023-01-03 RX ADMIN — PANTOPRAZOLE SODIUM 40 MG: 40 TABLET, DELAYED RELEASE ORAL at 06:26

## 2023-01-03 RX ADMIN — METHOCARBAMOL 1000 MG: 500 TABLET ORAL at 21:09

## 2023-01-03 RX ADMIN — METOPROLOL SUCCINATE 100 MG: 100 TABLET, FILM COATED, EXTENDED RELEASE ORAL at 10:31

## 2023-01-03 RX ADMIN — INSULIN LISPRO 1 UNITS: 100 INJECTION, SOLUTION INTRAVENOUS; SUBCUTANEOUS at 10:35

## 2023-01-03 RX ADMIN — SENNOSIDES 8.6 MG: 8.6 TABLET, FILM COATED ORAL at 18:38

## 2023-01-03 RX ADMIN — POTASSIUM CHLORIDE 20 MEQ: 1500 TABLET, EXTENDED RELEASE ORAL at 10:28

## 2023-01-03 RX ADMIN — METHOCARBAMOL 1000 MG: 500 TABLET ORAL at 06:26

## 2023-01-03 RX ADMIN — CLOPIDOGREL BISULFATE 75 MG: 75 TABLET ORAL at 10:32

## 2023-01-03 RX ADMIN — GABAPENTIN 400 MG: 400 CAPSULE ORAL at 10:48

## 2023-01-03 RX ADMIN — ATORVASTATIN CALCIUM 40 MG: 40 TABLET, FILM COATED ORAL at 18:38

## 2023-01-03 RX ADMIN — QUETIAPINE FUMARATE 25 MG: 25 TABLET ORAL at 21:10

## 2023-01-03 RX ADMIN — WARFARIN SODIUM 5 MG: 5 TABLET ORAL at 18:38

## 2023-01-03 RX ADMIN — SENNOSIDES 8.6 MG: 8.6 TABLET, FILM COATED ORAL at 10:31

## 2023-01-03 RX ADMIN — LISINOPRIL 40 MG: 20 TABLET ORAL at 10:30

## 2023-01-03 RX ADMIN — AMLODIPINE BESYLATE 10 MG: 10 TABLET ORAL at 10:32

## 2023-01-03 RX ADMIN — OXYCODONE HYDROCHLORIDE 10 MG: 10 TABLET, FILM COATED, EXTENDED RELEASE ORAL at 20:54

## 2023-01-03 RX ADMIN — BUPROPION HYDROCHLORIDE 150 MG: 150 TABLET, FILM COATED, EXTENDED RELEASE ORAL at 10:29

## 2023-01-03 RX ADMIN — METHOCARBAMOL 1000 MG: 500 TABLET ORAL at 14:38

## 2023-01-03 RX ADMIN — INSULIN GLARGINE 10 UNITS: 100 INJECTION, SOLUTION SUBCUTANEOUS at 21:09

## 2023-01-03 RX ADMIN — SERTRALINE HYDROCHLORIDE 175 MG: 100 TABLET ORAL at 10:30

## 2023-01-03 RX ADMIN — DOXAZOSIN 4 MG: 4 TABLET ORAL at 21:09

## 2023-01-03 RX ADMIN — FERROUS SULFATE TAB 325 MG (65 MG ELEMENTAL FE) 325 MG: 325 (65 FE) TAB at 11:59

## 2023-01-03 RX ADMIN — GABAPENTIN 400 MG: 400 CAPSULE ORAL at 18:38

## 2023-01-03 RX ADMIN — POLYETHYLENE GLYCOL 3350 17 G: 17 POWDER, FOR SOLUTION ORAL at 20:57

## 2023-01-03 RX ADMIN — GABAPENTIN 400 MG: 400 CAPSULE ORAL at 20:55

## 2023-01-03 NOTE — UTILIZATION REVIEW
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  Continues on scheduled analgesia  Not using any PRN analgesia  Will have VAC change on 1/2 and VAC may be able to be d/c at that time  INR is not therapeutic at this time  Overall patient is feeling improvement and pain well controlled  Glucose stable  She is cleared from Medicine for d/c to rehab  Date: 1/2:  R AKA wound clean and superficial, will d/c VAC today and change to Wet-to-dry dressings daily  Using Mesalt in wound beds  Increasing dose of Coumadin  INR 1 65  CM working on rehab destination  Date 1/3 - R AKA site had good granulation tissue  INR is 1 76 today, adjusting Coumadin  Pt is stable and cleared for d/c to rehab        Vital Signs:   01/03/23 0744 97 1 °F (36 2 °C) Abnormal  87 18 173/85 Abnormal  102 96 % None (Room air) Lying   01/02/23 2305 97 2 °F (36 2 °C) Abnormal  77 18 160/73 98 90 % None (Room air) Lying   01/02/23 2152 -- -- -- 128/64 -- -- -- --   01/02/23 1505 98 2 °F (36 8 °C) 83 18 142/65 94 93 % None (Room air) Lying   01/02/23 0755 98 3 °F (36 8 °C) 76 18 119/50 78 92 % None (Room air) Lying   01/01/23 2319 99 3 °F (37 4 °C) 74 18 146/65 94 94 % -- Lying   01/01/23 2150 98 3 °F (36 8 °C) 74 18 152/68 98 94 % None (Room air) Lying   01/01/23 1500 98 5 °F (36 9 °C) 78 20 155/70 -- 97 % -- Lying   01/01/23 0700 98 °F (36 7 °C) 75 20 148/65 -- 96 % None (Room air) Lying     Pertinent Labs/Diagnostic Results:       Results from last 7 days   Lab Units 01/03/23  0746 01/02/23  2122 01/02/23  1507 01/02/23  1119 01/02/23  0756 01/01/23  2037 01/01/23  1550 01/01/23  1204 01/01/23  0820 12/31/22  2116 12/31/22  1544 12/31/22  1206   POC GLUCOSE mg/dl 159* 178* 157* 136 102 127 130 160* 97 155* 137 143*     Results from last 7 days   Lab Units 01/03/23  0454 01/02/23  0746 12/30/22  0527   PROTIME seconds 20 4* 19 5* 27 4*   INR  1 76* 1 65* 2 56*     Medications:   Scheduled Medications:  amLODIPine, 10 mg, Oral, Daily  atorvastatin, 40 mg, Oral, Daily With Dinner  buPROPion, 150 mg, Oral, Daily  clopidogrel, 75 mg, Oral, Daily  collagenase, , Topical, Daily  doxazosin, 4 mg, Oral, HS  ferrous sulfate, 325 mg, Oral, Daily With Lunch  gabapentin, 400 mg, Oral, TID  insulin glargine, 10 Units, Subcutaneous, HS  insulin lispro, 1-6 Units, Subcutaneous, TID AC  lisinopril, 40 mg, Oral, Daily  methocarbamol, 1,000 mg, Oral, Q8H MICHELLE  metoprolol succinate, 100 mg, Oral, Daily  GINA ANTIFUNGAL, , Topical, BID  oxyCODONE, 10 mg, Oral, Q12H MICHELLE  pantoprazole, 40 mg, Oral, Early Morning  polyethylene glycol, 17 g, Oral, BID  potassium chloride, 20 mEq, Oral, Daily  QUEtiapine, 25 mg, Oral, HS  senna, 1 tablet, Oral, BID  sertraline, 175 mg, Oral, Daily  warfarin, 5 mg, Oral, Daily (warfarin)      Continuous IV Infusions:     PRN Meds:  acetaminophen, 650 mg, Oral, Q6H PRN  ALPRAZolam, 0 25 mg, Oral, Daily PRN  aluminum-magnesium hydroxide-simethicone, 30 mL, Oral, Q4H PRN  labetalol, 10 mg, Intravenous, Q6H PRN  metoclopramide, 10 mg, Intravenous, Q6H PRN  naloxone, 0 04 mg, Intravenous, Q1MIN PRN  ondansetron, 4 mg, Intravenous, Q6H PRN  oxyCODONE, 2 5 mg, Oral, Q4H PRN   Or  oxyCODONE, 5 mg, Oral, Q4H PRN   Or  oxyCODONE, 7 5 mg, Oral, Q4H PRN    Discharge Plan: rehab    Network Utilization Review Department  ATTENTION: Please call with any questions or concerns to [phone] and carefully listen to the prompts so that you are directed to the right person  All voicemails are confidential   Misha Gilman all requests for admission clinical reviews, approved or denied determinations and any other requests to dedicated fax number below belonging to the campus where the patient is receiving treatment   List of dedicated fax numbers for the Facilities:  [address] DENIALS (Administrative/Medical Necessity) [phone]   [address]  (Maternity/NICU/Pediatrics) [phone]   [address] [phone]   Charlotte Zimmerman [address] [phone]   [address] [address] Tona Vigil 28 [phone]   [facility] Amanda Killian [facility] [address] [facility] [phone]

## 2023-01-03 NOTE — OCCUPATIONAL THERAPY NOTE
Occupational Therapy Re-Evaluation([id number])     Patient Name: Mian Sequeira  XXPPM'[id number] Date: 1/3/2023  Problem List  Principal Problem:    PAD (peripheral artery disease) ([place] [address] )  Active Problems:    Depression, recurrent ([place] [address] )    Benign essential hypertension    Stroke ([place] [address] )    Type 2 diabetes mellitus with hyperglycemia, with long-term current use of insulin (HCC)    Acute on chronic anemia    Diabetic ulcer of heel associated with diabetes mellitus due to underlying condition (HCC)    HIT (heparin-induced thrombocytopenia)    Febrile    Past Medical History  Past Medical History:   Diagnosis Date    Anxiety     Depression     Diabetes ([place] [address] )     Diabetes mellitus ([place] [address] )     Esophageal reflux     28 APR 2015 RESOLVED    Hyperlipidemia     Hypertension     Low back pain     sciatica    Pneumonia 2019    Stroke ([place] [address] ) 12/2015    small vessel, L frontal corona radiata  Rx asa 81, Lipitor 40, risk modification    Vitamin D deficiency      Past Surgical History  Past Surgical History:   Procedure Laterality Date    AMPUTATION ABOVE KNEE (AKA) Right 12/1/2022    Procedure: (AKA), PSB  BKA;  Surgeon: Bharati Baldwin DO;  [facility];  Service: Vascular    ARTERIOGRAM Right 11/7/2022    Procedure: -Right lower extremity angiogram -Right CFA balloon angioplasty with [id number]  [employer]  -Right CFA DCB with 6x40 Bard Lutonix -Right CFA atherectomy with Jetstream and distal embolization protection with 7mm Spider FX -Right SFA/Pop angioplasty with 4x40 Chololate balloon -Right SFA/Pop DCB with 5x150 Bard Lutonix -Right SFA stent with 6x80 W W  [employer] x2 -R    COLONOSCOPY      IR AORTAGRAM WITH RUN-OFF  10/31/2022    IR LOWER EXTREMITY ANGIOGRAM  11/10/2022    IR LOWER EXTREMITY ANGIOGRAM  11/7/2022    CA AMPUTATION THIGH THROUGH FEMUR ANY LEVEL Left 11/23/2022    Procedure: (AKA);   Surgeon: Tawanda Valente DO;  [facility];  Service: Vascular    CA NEGATIVE PRESSURE WOUND THERAPY DME </= 50 SQ CM Bilateral 12/19/2022    Procedure: Right above knee amputation washout; vac change; Left above knee amputation debridement; vac placement;  Surgeon: Michael Lomas DO;  [facility];  Service: Vascular    WOUND DEBRIDEMENT Right 12/16/2022    Procedure: AKA STUMP WASHOUT, Left AKA Staple removal  application of wound vac to Right AKA; Surgeon: Deborah Gray MD;  [facility];  Service: Vascular           01/03/23 1410   Note Type   Note type Re-Evaluation   Pain Assessment   Pain Assessment Tool 0-10   Pain Score No Pain   Pain Rating: FLACC (Rest) - Face 0   Pain Rating: FLACC (Rest) - Legs 0   Pain Rating: FLACC (Rest) - Activity 0   Pain Rating: FLACC (Rest) - Cry 0   Pain Rating: FLACC (Rest) - Consolability 0   Score: FLACC (Rest) 0   Restrictions/Precautions   Weight Bearing Precautions Per Order Yes   RLE Weight Bearing Per Order NWB   LLE Weight Bearing Per Order NWB   Other Precautions Contact/isolation;Cognitive; Chair Alarm; Bed Alarm; Fall Risk   Subjective   Subjective "I have been here for about 4 months "   ADL   Where Assessed Supine, bed   Eating Assistance 5  Supervision/Setup   Grooming Assistance 4  Minimal Assistance   UB Bathing Assistance 3  Moderate Assistance   LB Bathing Assistance 2  Maximal Assistance   UB Dressing Assistance 3  Moderate Assistance   LB Dressing Assistance 2  Maximal Assistance   Toileting Assistance  1  Total Assistance   Bed Mobility   Rolling R 2  Maximal assistance   Additional items Assist x 1; Increased time required;LE management   Rolling L 2  Maximal assistance   Additional items Assist x 1; Increased time required;Verbal cues;LE management   Supine to Sit 3  Moderate assistance  (long-sit)   Additional items Assist x 1; Increased time required;Verbal cues;LE management   Sit to Supine 3  Moderate assistance   Additional items Assist x 1; Increased time required;LE management;Verbal cues   Transfers   Sit to Stand (N/A)   Functional Mobility   Functional Mobility   (recommend hoyerlift for OOB with nsg)   Balance   Static Sitting Poor +   Dynamic Sitting Poor   Activity Tolerance   Activity Tolerance Patient limited by fatigue   Medical Staff Made Aware nsg   RUE Assessment   RUE Assessment WFL   RUE Strength   RUE Overall Strength Within Functional Limits - able to perform ADL tasks with strength  (4/5 throughout)   LUE Assessment   LUE Assessment WFL   LUE Strength   LUE Overall Strength Within Functional Limits - able to perform ADL tasks with strength  (4/5 throughout)   Hand Function   Gross Motor Coordination Functional   Fine Motor Coordination Functional   Sensation   Light Touch No apparent deficits   Proprioception   Proprioception No apparent deficits   Vision-Basic Assessment   Current Vision   (glasses)   Vision - Complex Assessment   Acuity Able to read clock/calendar on wall without difficulty   Psychosocial   Psychosocial (WDL) WDL   Perception   Inattention/Neglect Appears intact   Cognition   Overall Cognitive Status Impaired   Arousal/Participation Alert   Attention Attends with cues to redirect   Orientation Level Oriented X4   Memory Decreased short term memory;Decreased recall of recent events;Decreased recall of precautions   Following Commands Follows one step commands without difficulty   Comments states hx hallucinations; states recent OOB mobility, which did not happen   Assessment   Limitation Decreased ADL status; Decreased UE strength;Decreased Safe judgement during ADL;Decreased cognition;Decreased endurance;Decreased high-level ADLs   Prognosis Fair   Assessment Pt seen for 30 min tx sesion with focus on re-evaluation  Pt initially evaluated on 10/25/22 with multiple tx sessions after multiple sx's   Currently, pt remains to demonstrate deficits with her functional balance, functional mobility, ADL status, transfer safety, b/l UE strength, activity tolerance(currently fair=15-20mins), and cognition(i e problem-solving)  Pt continues to demonstrate appropriateness for inpt rehab to improve her overall level of independence  Will continue  The patient's raw score on the AM-PAC Daily Activity inpatient short form is 15, standardized score is 34 69, less than 39 4  Patients at this level are likely to benefit from discharge to post-acute rehabilitation services  Please refer to the recommendation of the Occupational Therapist for safe discharge planning  Goals   Patient Goals "to be able to sit at my kitchen table and do a puzzle "   STG Time Frame   (1-7 days)   Short Term Goal #1 Pt will demonstrate Lillian with her bed mobility to facilitate EOB ADLs  Short Term Goal #2 Pt will tolerate continued cognitive/home-safety assessment and appropriate d/c recommendations will be provided  Short Term Goal  Pt will demonstrate improved activity tolerance to good(20-30mins) and sitting tolerance(onEOB) to 10-15mins to assist with ADLs  LTG Time Frame   (7-14 days)   Long Term Goal #1 Pt will demonstrate improved b/l UE strength by 1/2 MM grade to assist with ADLs/transfers  Long Term Goal #2 Pt will demonstrate improved functional balance by 1 grade to assist with ADLs  Long Term Goal Pt will demonstrate supervision with her UE and mod A with her LE bathing/dressing  Plan   Treatment Interventions ADL retraining;Functional transfer training;UE strengthening/ROM; Endurance training;Cognitive reorientation;Patient/family training;Equipment evaluation/education; Compensatory technique education;Continued evaluation   Goal Expiration Date 01/17/23   OT Treatment Day 19   Recommendation   OT Discharge Recommendation Post acute rehabilitation services   AM-[facility] Daily Activity Inpatient   Lower Body Dressing 2   Bathing 2   Toileting 1   Upper Body Dressing 3   Grooming 3   Eating 4   Daily Activity Raw Score 15   Daily Activity Standardized Score (Calc for Raw Score >=11) 34 69   AM-PAC Applied Cognition Inpatient   Following a Speech/Presentation 4   Understanding Ordinary Conversation 4   Taking Medications 3   Remembering Where Things Are Placed or Put Away 3   Remembering List of 4-5 Errands 3   Taking Care of Complicated Tasks 2   Applied Cognition Raw Score 19   Applied Cognition Standardized Score 39 77   Danny Rutledge

## 2023-01-03 NOTE — PLAN OF CARE
Problem: OCCUPATIONAL THERAPY ADULT  Goal: Performs self-care activities at highest level of function for planned discharge setting  See evaluation for individualized goals  Description: Treatment Interventions: ADL retraining, Functional transfer training, UE strengthening/ROM, Endurance training, Cognitive reorientation, Patient/family training, Equipment evaluation/education, Compensatory technique education, Energy conservation, Activityengagement          See flowsheet documentation for full assessment, interventions and recommendations  Note: Limitation: Decreased ADL status, Decreased UE strength, Decreased Safe judgement during ADL, Decreased cognition, Decreased endurance, Decreased high-level ADLs  Prognosis: Fair  Assessment: Pt seen for 30 min tx sesion with focus on re-evaluation  Pt initially evaluated on 10/25/22 with multiple tx sessions after multiple sx's  Currently, pt remains to demonstrate deficits with her functional balance, functional mobility, ADL status, transfer safety, b/l UE strength, activity tolerance(currently fair=15-20mins), and cognition(i e problem-solving)  Pt continues to demonstrate appropriateness for inpt rehab to improve her overall level of independence  Will continue  The patient's raw score on the AM-PAC Daily Activity inpatient short form is 15, standardized score is 34 69, less than 39 4  Patients at this level are likely to benefit from discharge to post-acute rehabilitation services  Please refer to the recommendation of the Occupational Therapist for safe discharge planning       OT Discharge Recommendation: Post acute rehabilitation services

## 2023-01-04 LAB
GLUCOSE SERPL-MCNC: 109 MG/DL (ref 65–140)
GLUCOSE SERPL-MCNC: 127 MG/DL (ref 65–140)
GLUCOSE SERPL-MCNC: 141 MG/DL (ref 65–140)
GLUCOSE SERPL-MCNC: 148 MG/DL (ref 65–140)
INR PPP: 2.8 (ref 0.84–1.19)
PROTHROMBIN TIME: 29.3 SECONDS (ref 11.6–14.5)

## 2023-01-04 PROCEDURE — 99024 POSTOP FOLLOW-UP VISIT: CPT | Performed by: SURGERY

## 2023-01-04 PROCEDURE — 85610 PROTHROMBIN TIME: CPT | Performed by: PHYSICIAN ASSISTANT

## 2023-01-04 PROCEDURE — 82948 REAGENT STRIP/BLOOD GLUCOSE: CPT

## 2023-01-04 RX ADMIN — GABAPENTIN 400 MG: 400 CAPSULE ORAL at 21:13

## 2023-01-04 RX ADMIN — BUPROPION HYDROCHLORIDE 150 MG: 150 TABLET, FILM COATED, EXTENDED RELEASE ORAL at 09:44

## 2023-01-04 RX ADMIN — OXYCODONE HYDROCHLORIDE 10 MG: 10 TABLET, FILM COATED, EXTENDED RELEASE ORAL at 21:13

## 2023-01-04 RX ADMIN — INSULIN GLARGINE 10 UNITS: 100 INJECTION, SOLUTION SUBCUTANEOUS at 21:13

## 2023-01-04 RX ADMIN — CLOPIDOGREL BISULFATE 75 MG: 75 TABLET ORAL at 09:45

## 2023-01-04 RX ADMIN — MICONAZOLE NITRATE: 20 CREAM TOPICAL at 17:44

## 2023-01-04 RX ADMIN — PANTOPRAZOLE SODIUM 40 MG: 40 TABLET, DELAYED RELEASE ORAL at 05:39

## 2023-01-04 RX ADMIN — WARFARIN SODIUM 5 MG: 5 TABLET ORAL at 17:43

## 2023-01-04 RX ADMIN — MICONAZOLE NITRATE: 20 CREAM TOPICAL at 09:50

## 2023-01-04 RX ADMIN — DOXAZOSIN 4 MG: 4 TABLET ORAL at 21:13

## 2023-01-04 RX ADMIN — METHOCARBAMOL 1000 MG: 500 TABLET ORAL at 14:41

## 2023-01-04 RX ADMIN — SERTRALINE HYDROCHLORIDE 175 MG: 100 TABLET ORAL at 09:44

## 2023-01-04 RX ADMIN — METHOCARBAMOL 1000 MG: 500 TABLET ORAL at 21:13

## 2023-01-04 RX ADMIN — METHOCARBAMOL 1000 MG: 500 TABLET ORAL at 05:39

## 2023-01-04 RX ADMIN — FERROUS SULFATE TAB 325 MG (65 MG ELEMENTAL FE) 325 MG: 325 (65 FE) TAB at 12:50

## 2023-01-04 RX ADMIN — GABAPENTIN 400 MG: 400 CAPSULE ORAL at 17:43

## 2023-01-04 RX ADMIN — GABAPENTIN 400 MG: 400 CAPSULE ORAL at 09:44

## 2023-01-04 RX ADMIN — SENNOSIDES 8.6 MG: 8.6 TABLET, FILM COATED ORAL at 17:43

## 2023-01-04 RX ADMIN — QUETIAPINE FUMARATE 25 MG: 25 TABLET ORAL at 21:14

## 2023-01-04 RX ADMIN — ATORVASTATIN CALCIUM 40 MG: 40 TABLET, FILM COATED ORAL at 17:43

## 2023-01-04 RX ADMIN — POTASSIUM CHLORIDE 20 MEQ: 1500 TABLET, EXTENDED RELEASE ORAL at 09:44

## 2023-01-04 NOTE — PROGRESS NOTES
[address] [place] [address]  Progress Note - Carla Arita 1960, 65 y o  female MRN: [id number]  Unit/Bed#: E4 -01 Encounter: [id number]  Primary Care Provider: ALEX Ross   Date and time admitted to hospital: 10/21/2022  7:58 PM    * PAD (peripheral artery disease) [facility])  Assessment & Plan  65year old female former smoker w/HTN, HLD, uncontrolled type II DM (A1c 9 8), hx CVA, presenting with nonhealing extensive L heel ulceration and R hallux and 5th digit ulceration  She underwent multiple endovascular interventions and surgeries  She is now S/P bilateral AKA's  S/p multiple b/l endovascular interventions  JUSTIN  R hemispheric CVA    S/p L AKA 11/23/22 [facility])  S/p R AKA, wound debridement 12/1/22 [facility])  S/p R AKA wound/stump washout and VAC placement 12/16/22 [facility]  S/p R AKA wound/stump VAC change, L AKA stump washout/packing 12/19/22 ([facility])  Wound Cx: enterobacter cloacae                         Recommendations:  - Bilateral tissue loss in the setting of uncontrolled type II DM and NYDIA on admission, now s/p bilateral AKA's as above followed by wound debridements  - Endovascular interventions, c/b atheroembolic event to the RLE and JUSTIN with R hemispheric CVA  - Local wound care to bilateral AKA's    • R wounds cleansed with saline; apply wet to dry dressing and cover with ABD, Rachid and ABD    • R medial thigh apply moisturizer to skin    • L AKA was with saline; peroxide and leave to open air   - Appreciate ID for assistance with abx- L stump cultures growing out enterobacter cloacae    Patient completed doxycycline on 1/2/23  - Incentive spirometer   - Continue statin, Plavix and warfarin; daily INR and dosage of warfarin  - Medical management and glucose control per SLIM  - Pain management as per SLIM  - Appreciate psych input and management - addition of wellbutrin and adjustment of zoloft   - Disposition planning for long-term care placement; CM is working on placement which should be ready in the next 2 days  - D/w Dr Faustina Larson        Subjective:    Patient is s/p B aka's  She has no new complaints  Pain controlled  No CP or SOB  Spoke with Care Management who is helping with long term placement  Medications include: atorvastatin 40, clopidogrel 75 and warfarin  Additionally, there is pain Rx with gabapentin 400 TID and oxycodone, though she has not had oxycodone in 2 days  Hgb 7 9    INR? 1 76    Vitals:  BP 98/58 (BP Location: Right arm)   Pulse 83   Temp (!) 97 2 °F (36 2 °C) (Temporal)   Resp 18   Ht 5' 4" (1 626 m)   Wt 66 2 kg (145 lb 15 1 oz)   SpO2 94%   BMI 25 05 kg/m²     I/Os:  No intake/output data recorded  No intake/output data recorded  Lab Results and Cultures:   Lab Results   Component Value Date    WBC 8 29 12/27/2022    HGB 7 9 (L) 12/27/2022    HCT 27 0 (L) 12/27/2022    MCV 87 12/27/2022     12/27/2022     Lab Results   Component Value Date    CALCIUM 8 1 (L) 12/24/2022    K 3 6 12/24/2022    CO2 29 12/24/2022     12/24/2022    BUN 8 12/24/2022    CREATININE 0 65 12/24/2022     Lab Results   Component Value Date    INR 2 80 (H) 01/04/2023    INR 1 76 (H) 01/03/2023    INR 1 65 (H) 01/02/2023    PROTIME 29 3 (H) 01/04/2023    PROTIME 20 4 (H) 01/03/2023    PROTIME 19 5 (H) 01/02/2023        Blood Culture:   Lab Results   Component Value Date    BLOODCX No Growth After 5 Days   11/13/2022    BLOODCX Staphylococcus coagulase negative (A) 11/13/2022   ,   Urinalysis:   Lab Results   Component Value Date    COLORU Yellow 11/12/2022    CLARITYU Slightly Cloudy 11/12/2022    SPECGRAV 1 025 11/12/2022    PHUR 5 5 11/12/2022    PHUR 6 5 02/23/2018    LEUKOCYTESUR Trace (A) 11/12/2022    NITRITE Positive (A) 11/12/2022    GLUCOSEU Negative 11/12/2022    KETONESU Negative 11/12/2022    BILIRUBINUR Negative 11/12/2022    BLOODU Large (A) 11/12/2022   ,       Medications:  Current Facility-Administered Medications Medication Dose Route Frequency   • acetaminophen (TYLENOL) tablet 650 mg  650 mg Oral Q6H PRN   • ALPRAZolam (XANAX) tablet 0 25 mg  0 25 mg Oral Daily PRN   • aluminum-magnesium hydroxide-simethicone (MYLANTA) oral suspension 30 mL  30 mL Oral Q4H PRN   • amLODIPine (NORVASC) tablet 10 mg  10 mg Oral Daily   • atorvastatin (LIPITOR) tablet 40 mg  40 mg Oral Daily With Dinner   • buPROPion (WELLBUTRIN XL) 24 hr tablet 150 mg  150 mg Oral Daily   • clopidogrel (PLAVIX) tablet 75 mg  75 mg Oral Daily   • collagenase (SANTYL) ointment   Topical Daily   • doxazosin (CARDURA) tablet 4 mg  4 mg Oral HS   • ferrous sulfate tablet 325 mg  325 mg Oral Daily With Lunch   • gabapentin (NEURONTIN) capsule 400 mg  400 mg Oral TID   • insulin glargine (LANTUS) subcutaneous injection 10 Units 0 1 mL  10 Units Subcutaneous HS   • insulin lispro (HumaLOG) 100 units/mL subcutaneous injection 1-6 Units  1-6 Units Subcutaneous TID AC   • labetalol (NORMODYNE) injection 10 mg  10 mg Intravenous Q6H PRN   • lisinopril (ZESTRIL) tablet 40 mg  40 mg Oral Daily   • methocarbamol (ROBAXIN) tablet 1,000 mg  1,000 mg Oral Q8H [address]   • metoclopramide (REGLAN) injection 10 mg  10 mg Intravenous Q6H PRN   • metoprolol succinate (TOPROL-XL) 24 hr tablet 100 mg  100 mg Oral Daily   • moisture barrier miconazole 2% cream (aka GINA MOISTURE BARRIER ANTIFUNGAL CREAM)   Topical BID   • naloxone (NARCAN) 0 04 mg/mL syringe 0 04 mg  0 04 mg Intravenous Q1MIN PRN   • ondansetron (ZOFRAN) injection 4 mg  4 mg Intravenous Q6H PRN   • oxyCODONE (OxyCONTIN) 12 hr tablet 10 mg  10 mg Oral Q12H MICHELLE   • oxyCODONE (ROXICODONE) IR tablet 2 5 mg  2 5 mg Oral Q4H PRN    Or   • oxyCODONE (ROXICODONE) IR tablet 5 mg  5 mg Oral Q4H PRN    Or   • oxyCODONE (ROXICODONE) IR tablet 7 5 mg  7 5 mg Oral Q4H PRN   • pantoprazole (PROTONIX) EC tablet 40 mg  40 mg Oral Early Morning   • polyethylene glycol (MIRALAX) packet 17 g  17 g Oral BID   • potassium chloride (K-DUR,KLOR-CON) CR tablet 20 mEq  20 mEq Oral Daily   • QUEtiapine (SEROquel) tablet 25 mg  25 mg Oral HS   • senna (SENOKOT) tablet 8 6 mg  1 tablet Oral BID   • sertraline (ZOLOFT) tablet 175 mg  175 mg Oral Daily   • warfarin (COUMADIN) tablet 5 mg  5 mg Oral Daily (warfarin)       Imaging:    No new imaging    Physical Exam:    AA+Ox3    NAD  RRR S1S2  Overall clear anteriorly  Obese    Bilateral AKA's with images below          R AKA lateral aspect      R AKA      L AKA      L AKA lateral aspect                Oni Wick  1/4/2023  [place] [id number] [facility]

## 2023-01-04 NOTE — WOUND OSTOMY CARE
Progress Note - Wound   Wayne Read 65 y o  female MRN: [id number]  Unit/Bed#: E4 -01 Encounter: [id number]        Assessment:   Patient is seen for wound care follow-up  64 yo female admitted to [facility] for treatment of PAD  PMH: IDDM, HTN, hld, obesity depression     B/L AKA- managed by surgery  No longer with vac in place  Abdominal and breast folds are intact with no skin loss or wounds present  Incontinent of bowel and bladder as seen on assessment - michaela-care rendered  Lying supine on p-500 specialty mattress  Post assessment patient repositioned to lay on left side with green foam wedges  Assist x 1-2 for turning and repositioning  Purewick not in place  Findings:    1  Left Perineum Wound: Resolved  Area now intact pink, epithelial skin  Michaela-wound intact  No drainage noted  No skin loss noted  Continue with zeferino anti-fungal cream to area  2  HA Left Inner Buttocks DTPI: irregular in shape area with no skin loss at this time  Area is intact dark purple and non-blanching  Michaela-wound is intact  No drainage noted  Recommend continued use of ZEFERINO Anti-Fungal Cream to area  Deep Tissue Pressure Injury wounds have the potential to evolve into unstageable, stage III or stage IV wounds  3  B/L Sacro-Buttocks Candidiasis: area is intact, pink/ red blanchable skin with satelittle lesions noted  Area extends into perineum  Groin folds are intact with no skin loss noted  No induration, fluctuance, odor, warmth/temperature differences, redness, or purulence noted to the above noted wounds and skin areas assessed  New dressings applied per orders listed below  Patient tolerated well- no s/s of non-verbal pain or discomfort observed during the encounter  Bedside nurse aware of plan of care  See flow sheets for more detailed assessment findings  Orders listed below and wound care will continue to follow, call or tiger text with questions             Wound Care Plan:   1-P500 low air-loss mattress  2-Lower extremity wounds per surgery team   3-Moisturize skin daily with lotion  4-Turn/reposition every 2 hours while in bed using positioning wedges; and weight shift frequently while in chair for pressure re-distribution on skin  5-Offloading air cushion if/when out of bed to chair  6-Sacrum, buttocks, perineum, groin folds--cleanse with soap and water, pat dry  Apply Mac cream twice daily and as needed with incontinence care  WOUNDS:  Wound 12/29/22 Pressure Injury Buttocks Medial;Distal;Left (Active)   Wound Image   01/04/23 0928   Wound Description Light purple;Non-blanchable erythema; Intact; Beefy red 01/04/23 0928   Pressure Injury Stage DTPI 01/04/23 0928   Michaela-wound Assessment Intact 01/04/23 0928   Wound Length (cm) 1 cm 01/04/23 0928   Wound Width (cm) 1 cm 01/04/23 0928   Wound Depth (cm) 0 cm 01/04/23 0928   Wound Surface Area (cm^2) 1 cm^2 01/04/23 0928   Wound Volume (cm^3) 0 cm^3 01/04/23 0928   Calculated Wound Volume (cm^3) 0 cm^3 01/04/23 0928   Drainage Amount None 01/04/23 0928   Drainage Description Other (Comment) 01/04/23 0928   Non-staged Wound Description Not applicable 29/03/05 9480   Treatments Cleansed;Site care 01/04/23 0928   Dressing Other (Comment) 01/04/23 0928   Dressing Changed Changed 01/04/23 0928   Patient Tolerance Tolerated well 01/04/23 0928   Dressing Status Intact 01/04/23 0928       Wound 12/30/22 Perineum Left (Active)   Wound Image   01/04/23 0928   Wound Description Intact; Epithelialization 01/04/23 0928   Michaela-wound Assessment Intact 01/04/23 0928   Wound Length (cm) 0 cm 01/04/23 0928   Wound Width (cm) 0 cm 01/04/23 0928   Wound Depth (cm) 0 cm 01/04/23 0928   Wound Surface Area (cm^2) 0 cm^2 01/04/23 0928   Wound Volume (cm^3) 0 cm^3 01/04/23 0928   Calculated Wound Volume (cm^3) 0 cm^3 01/04/23 0928   Change in Wound Size % 100 01/04/23 0928   Drainage Amount None 01/04/23 0928   Non-staged Wound Description Not applicable 17/44/88 3784 Treatments Cleansed;Site care 01/04/23 0928   Dressing Other (Comment) 01/04/23 0928   Dressing Changed New 01/04/23 0928   Patient Tolerance Tolerated well 01/04/23 0928   Dressing Status Intact 01/04/23 0928                Chinedu Grimaldo RN, BSN

## 2023-01-05 LAB
ERYTHROCYTE [DISTWIDTH] IN BLOOD BY AUTOMATED COUNT: 16.9 % (ref 11.6–15.1)
GLUCOSE SERPL-MCNC: 120 MG/DL (ref 65–140)
GLUCOSE SERPL-MCNC: 149 MG/DL (ref 65–140)
GLUCOSE SERPL-MCNC: 151 MG/DL (ref 65–140)
GLUCOSE SERPL-MCNC: 193 MG/DL (ref 65–140)
HCT VFR BLD AUTO: 32.5 % (ref 34.8–46.1)
HGB BLD-MCNC: 9.4 G/DL (ref 11.5–15.4)
INR PPP: 2.56 (ref 0.84–1.19)
MCH RBC QN AUTO: 24.9 PG (ref 26.8–34.3)
MCHC RBC AUTO-ENTMCNC: 28.9 G/DL (ref 31.4–37.4)
MCV RBC AUTO: 86 FL (ref 82–98)
PLATELET # BLD AUTO: 472 THOUSANDS/UL (ref 149–390)
PMV BLD AUTO: 8.5 FL (ref 8.9–12.7)
PROTHROMBIN TIME: 27.4 SECONDS (ref 11.6–14.5)
RBC # BLD AUTO: 3.77 MILLION/UL (ref 3.81–5.12)
WBC # BLD AUTO: 10.68 THOUSAND/UL (ref 4.31–10.16)

## 2023-01-05 PROCEDURE — 82948 REAGENT STRIP/BLOOD GLUCOSE: CPT

## 2023-01-05 PROCEDURE — 85610 PROTHROMBIN TIME: CPT | Performed by: PHYSICIAN ASSISTANT

## 2023-01-05 PROCEDURE — 85027 COMPLETE CBC AUTOMATED: CPT | Performed by: PHYSICIAN ASSISTANT

## 2023-01-05 PROCEDURE — 99024 POSTOP FOLLOW-UP VISIT: CPT | Performed by: SURGERY

## 2023-01-05 RX ADMIN — LISINOPRIL 40 MG: 20 TABLET ORAL at 08:35

## 2023-01-05 RX ADMIN — METOPROLOL SUCCINATE 100 MG: 100 TABLET, FILM COATED, EXTENDED RELEASE ORAL at 08:34

## 2023-01-05 RX ADMIN — PANTOPRAZOLE SODIUM 40 MG: 40 TABLET, DELAYED RELEASE ORAL at 05:34

## 2023-01-05 RX ADMIN — GABAPENTIN 400 MG: 400 CAPSULE ORAL at 17:45

## 2023-01-05 RX ADMIN — BUPROPION HYDROCHLORIDE 150 MG: 150 TABLET, FILM COATED, EXTENDED RELEASE ORAL at 08:35

## 2023-01-05 RX ADMIN — QUETIAPINE FUMARATE 25 MG: 25 TABLET ORAL at 21:12

## 2023-01-05 RX ADMIN — POTASSIUM CHLORIDE 20 MEQ: 1500 TABLET, EXTENDED RELEASE ORAL at 08:35

## 2023-01-05 RX ADMIN — AMLODIPINE BESYLATE 10 MG: 10 TABLET ORAL at 08:36

## 2023-01-05 RX ADMIN — GABAPENTIN 400 MG: 400 CAPSULE ORAL at 08:34

## 2023-01-05 RX ADMIN — FERROUS SULFATE TAB 325 MG (65 MG ELEMENTAL FE) 325 MG: 325 (65 FE) TAB at 13:50

## 2023-01-05 RX ADMIN — GABAPENTIN 400 MG: 400 CAPSULE ORAL at 21:11

## 2023-01-05 RX ADMIN — SERTRALINE HYDROCHLORIDE 175 MG: 100 TABLET ORAL at 08:35

## 2023-01-05 RX ADMIN — METHOCARBAMOL 1000 MG: 500 TABLET ORAL at 21:11

## 2023-01-05 RX ADMIN — ATORVASTATIN CALCIUM 40 MG: 40 TABLET, FILM COATED ORAL at 17:45

## 2023-01-05 RX ADMIN — MICONAZOLE NITRATE: 20 CREAM TOPICAL at 08:36

## 2023-01-05 RX ADMIN — WARFARIN SODIUM 5 MG: 5 TABLET ORAL at 17:45

## 2023-01-05 RX ADMIN — SENNOSIDES 8.6 MG: 8.6 TABLET, FILM COATED ORAL at 17:45

## 2023-01-05 RX ADMIN — CLOPIDOGREL BISULFATE 75 MG: 75 TABLET ORAL at 08:34

## 2023-01-05 RX ADMIN — MICONAZOLE NITRATE: 20 CREAM TOPICAL at 17:46

## 2023-01-05 RX ADMIN — INSULIN GLARGINE 10 UNITS: 100 INJECTION, SOLUTION SUBCUTANEOUS at 21:11

## 2023-01-05 RX ADMIN — METHOCARBAMOL 1000 MG: 500 TABLET ORAL at 13:57

## 2023-01-05 RX ADMIN — INSULIN LISPRO 2 UNITS: 100 INJECTION, SOLUTION INTRAVENOUS; SUBCUTANEOUS at 13:55

## 2023-01-05 RX ADMIN — METHOCARBAMOL 1000 MG: 500 TABLET ORAL at 05:34

## 2023-01-05 NOTE — ASSESSMENT & PLAN NOTE
65year old female former smoker w/HTN, HLD, uncontrolled type II DM (A1c 9 8), hx CVA, presenting with nonhealing extensive L heel ulceration and R hallux and 5th digit ulceration  She underwent multiple endovascular interventions and surgeries  She is now S/P bilateral AKA's  S/p multiple b/l endovascular interventions  JUSTIN  R hemispheric CVA    S/p L AKA 11/23/22 [facility])  S/p R AKA, wound debridement 12/1/22 [facility])  S/p R AKA wound/stump washout and VAC placement 12/16/22 [facility]  S/p R AKA wound/stump VAC change, L AKA stump washout/packing 12/19/22 ([facility])  Wound Cx: enterobacter cloacae     INR 2 56    Recommendations:  - Bilateral tissue loss in the setting of uncontrolled type II DM and NYDIA on admission, now s/p bilateral AKA's as above followed by wound debridements  - Endovascular interventions, c/b atheroembolic event to the RLE and JUSTIN with R hemispheric CVA  - Local wound care to bilateral AKA's daily    • R wounds cleanse with saline; apply wet to dry dressing and cover with ABD, Rachid and ABD    • R medial thigh apply moisturizer to skin    • L AKA cleanse with saline; peroxide and leave to open air   - Appreciate ID for assistance with abx- L stump cultures growing out enterobacter cloacae    Patient completed doxycycline on 1/2/23  - Incentive spirometer   - Continue statin, Plavix and warfarin; daily INR and dosage of warfarin  - Medical management and glucose control per SLIM  - Pain management as per SLIM  - Appreciate psych input and management - addition of wellbutrin and adjustment of zoloft   - Disposition planning for long-term care placement; CM is working on placement, appreciate assistance   - Will d/w Dr Arabella Esposito

## 2023-01-05 NOTE — UTILIZATION REVIEW
URGENT/EMERGENT  INPATIENT/SPU AUTHORIZATION REQUEST    Date: 01/05/23            # Pages in this Request:     X New Request   Additional Information for PA#:     Office Contact Name:  Blayne Becerra Title: Utilization Review, Isabel[place] Nurse     Phone: [phone]  Ext  Availability (Date/Time): Wednesday - Friday 8 am- 4 pm    X Inpatient Review  SPU Review        Current       X Late Pick-up   · How your facility was first notified of the Late Pick-up: EVS  · When your facility was first notified of the Late Pick-up (date): 12/28/22-BALANCE BILL $[phone]  (Marilee Burgess [id number], [facility] [id number], PH#[phone])PAID $[phone] ON 12/28/22         RECIPIENT INFORMATION    Recipient OJ#:[id number]    Recipient Name: Madeline Jason    YOB: 1960  65 y o  Recipient Alias:     Gender:   Male X Female Medicaid Eligibility ([address] [place] [address]): INSURANCE INFORMATION    (All other private or governmental health insurance benefits must be utilized prior to billing the MA Program)    Was this admission the result of an MVA, other accident, assault, injury, fall, gunshot, bite etc ? Yes X No                   If yes, provide a brief description of the incident  Does the recipient have other insurance coverage? X Yes  No        Insurance Company Name/Policy # Marilee Burgess [id number], [facility] [id number], PH#[phone])PAID [id number] ON 12/28/22       Did that insurance pay on this claim? X Yes  No        Did that insurance deny this claim? Yes X No    If yes, reason for denial:      Does the recipient have Medicare? Yes X No        Did Medicare exhaust prior to this admission? Yes  No            Did Medicare partially pay this claim? Yes  No        Did that insurance deny this claim? Yes  No    If yes, reason for denial:          Was the recipient a prisoner at the time of admission?    Yes X No            PROVIDER INFORMATION    Hospital [facility]: [facility]  PROMISe Provider ID#: [id number]    [address] [place] [address] Physician Name: Tricia Odonnell [facility] [place] [facility] Provider ID#: [id number]        ADMISSION INFORMATION    Type of Admission: (please choose one)    X ED      Direct    If yes, from where? Transfer    If yes, transferring hospital (inpatient, rehab, or psych) Provider Name/Provider ID#: Admission Floor or Unit Type: MED-SURG    Dates/Times:        ED Date/Time: 10/21/2022  1621 PM        Observation Date/Time:         Admission Date/Time: 10/22/22  02:12 AM        Discharge or Transfer Date/Time: No discharge date for patient encounter  STILL IN HOUSE        DIAGNOSIS/PROCEDURE CODES    Primary Diagnosis Code/Primary Diagnosis Code description:     Necrotic toes ([facility] [address] ) Andriette Expose  Non healing left heel wound [S91 302A]  Open wound of foot, complicated, left, initial encounter [S91 302A]  Sepsis ([facility] [address] ) [A41 9]  (MAY RE-ORDER DX CODES)  Additional Diagnosis Code(s) and Description(s)-(up to three additional codes):     Procedure Code (one) and description: [id number] DETACHMENT L UPPER LEG, LOW, OPEN DON        CLINICAL INFORMATION - PRIOR ADMISSION ONLY    Is there a prior admission with a discharge date within 30 days of the date of this admission? X No (Proceed to the next section - "Clinical Information - General Review Checklist:)      Yes (Provide the following information)     Prior admission dates:    MA Prior Authorization Number:        Review Outcome:     Diagnosis Code(s)/Description:    Procedure Code/Description:    Findings:    Treatment:    Condition on Discharge:   Vitals:    Labs:   Imaging:   Medications: Follow-up Instructions:    Disposition:        CLINICAL INFORMATION - GENERAL REVIEW CHECKLIST    EMERGENCY DEPARTMENT: (Proceed to "ADMISSION" if Direct Admission)    Presenting Signs/Symptoms:   • Wound Check       Referred to ed by pcp for wound on left heel  Pt reports vomiting  Denies fever  Hx of diabetes   Not checking blood glucose at home  Not taking medications as prescribed           Initial Presentation: 65 y o  female  Referred to ED  By PCP with a left foot wound, has been ongoing  For  Awhile, initially  Was a  Small sore, continued  To worsen  Previously saw podiatry and PCP, thought it would  Improve  Was non compliant with recommendations  Put off seeking care due to mental health and financial issues  Saw PCP on  10/21, noted eschar and referred to ED  Had fevers at home earlier in the week, poor po intake and episodic nausea and vomiting  Has not been taking  Insulin as prescribed,   due to depression and kristina issues often stretching it out to once a day with short acting insulin and every other day for her long acting insulin   In ed she was evaluated w/concern for sepsis and left foot wound infection and admission was requested  PMH  Is  IDDm,  HTN, obesity and depression  Met Sepsis criteria   With  Leukocytosis and tachycardia, likely source left foot wound  CT scan concerning for soft tissue edema  EKG  Abnormal    Admit  Ip with  Non healing left heel wound, Hypertensive urgency, Abnormal EKG, Sepsis, Hypokalemia, nausea and vomiting and plan is  Podiatry consult,  IVF, monitor labs, blood cultures,  DYLLAN,  Antihypertensives, tele and monitor  BP        Podiatry consult ( 10/22)     Continue  Local wound care left heel  No evidence of osteo on imaging  Continue DYLLAN  Monitor labs and vital signs  Can be  WBAT  LLE             Date:   10/23       Day 2:   Continue  DYLLAN and IVF  Feels improved  Less nausea  Swelling  B/L LE  Noted  Waiting arterial studies  Continue local wound care  Medication/treatment prior to arrival in the ED:     Past Medical History:    Active Ambulatory Problems     Diagnosis Date Noted   • Esophageal reflux 02/23/2018   • Uncontrolled type 2 diabetes with neuropathy 02/23/2018   • Class 3 severe obesity due to excess calories with serious comorbidity and body mass index (BMI) of 40 0 to 44 9 in adult [facility]) 02/24/2018   • Depression, recurrent ([facility] [address] ) 03/02/2018   • Anxiety 03/19/2015   • Benign essential hypertension 08/28/2012   • Stroke ([address] ) 12/14/2015   • Diabetic peripheral neuropathy ([facility] [address] ) 05/08/2017   • Vitamin D deficiency 54/20/8028   • Systolic murmur 14/43/4476   • Familial combined hyperlipidemia 08/28/2012   • Arthritis 01/31/2019   • Acute colitis 01/31/2020   • Moderate protein-calorie malnutrition ([address] ) 02/03/2020   • 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 02/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   • 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 ([facility] [address] ) 01/25/2021   • Type 2 diabetes mellitus with hyperglycemia, with long-term current use of insulin ([facility] [address] ) 10/18/2021   • Hyperparathyroidism ([facility] [address] ) 10/18/2021   • Epigastric pain 02/20/2022   • Wound of lower extremity 10/21/2022   • Non healing left heel wound 10/22/2022   • Hypertensive urgency 10/22/2022   • Hypokalemia 10/22/2022   • Generalized edema due to fluid overload 10/27/2022   • CVA (cerebral vascular accident) ([address] ) 11/13/2022   • Diarrhea 11/14/2022   • Mild protein-calorie malnutrition ([facility] [address] ) 12/07/2022       Past Medical History:   Diagnosis Date   • Depression    • Diabetes ([facility] [address] )    • Diabetes mellitus ([facility] [address] )    • Hypertension    • Low back pain    • Pneumonia 2019       Clinical Exam:     Initial Vital Signs: (Temp, Pulse, Resp, and BP)   ED Triage Vitals   Temperature Pulse Respirations Blood Pressure SpO2   10/21/22 1629 10/21/22 1628 10/21/22 1628 10/21/22 1628 10/21/22 1628   98 3 °F (36 8 °C) 93 18 (!) 238/115 98 %      Temp Source Heart Rate Source Patient Position - Orthostatic VS BP Location FiO2 (%)   10/21/22 1629 10/21/22 1628 10/21/22 1628 10/21/22 1628 12/20/22 0613   Oral Monitor Sitting Right arm 2      Pain Score       10/21/22 1629       4           Pertinent Repeat Vital Signs: (include times they were obtained)  Additional Vital Signs:   10/23/22 2315 97 6 °F (36 4 °C) 79 20 155/71 110 98 % None (Room air) Sitting   10/23/22 1751 97 7 °F (36 5 °C) 84 18 120/55 80 97 % None (Room air) Sitting   10/23/22 1150 97 6 °F (36 4 °C) 86 18 125/67 90 97 % -- Sitting   10/23/22 0657 97 8 °F (36 6 °C) 89 18 131/68 -- 93 % -- Lying   10/23/22 0337 98 4 °F (36 9 °C) 92 18 -- -- 94 % -- --   10/22/22 2338 98 2 °F (36 8 °C) 92 18 141/65 -- 93 % -- Lying   10/22/22 2127 -- 95 -- 163/75 108 -- -- Lying   10/22/22 1927 97 9 °F (36 6 °C) 93 18 176/68 Abnormal  105 92 % None (Room air) Lying   10/22/22 1900 -- -- -- 140/64 -- -- -- --   10/22/22 1629 -- -- -- 214/83 Abnormal   123 -- -- Sitting   BP: RN aware at 10/22/22 1629   10/22/22 1628 98 6 °F (37 °C) 83 20 213/82 Abnormal  130 98 % None (Room air) Sitting   10/22/22 1141 97 1 °F (36 2 °C) Abnormal  86 17 168/74 106 93 % None (Room air) Lying   10/22/22 0859 96 4 °F (35 8 °C) Abnormal  88 16 187/81 Abnormal  116 98 % None (Room air) Lying   10/22/22 0815 -- 82 16 168/74 107 97 % None (Room air) Lying   10/22/22 0745 -- 82 14 169/74 106 93 % None (Room air) Sitting   10/22/22 0637 -- 87 16 180/75 Abnormal  -- 96 % None (Room air) Lying   10/22/22 0607 -- 93 16 193/82 Abnormal  -- 99 % None (Room air) Lying   10/22/22 0517 -- 80 14 234/105 Abnormal   -- 99 % None (Room air) Sitting   BP: Admitting PA aware of pt's B/P at 10/22/22 0517   10/22/22 0345 -- 80 14 198/87 Abnormal  125 94 % None (Room air) Lying   10/22/22 0230 -- 80 14 222/93 Abnormal  134 94 % None (Room air) Lying   10/22/22 0130 -- 82 18 226/95 Abnormal  136 93 % None (Room air) Lying   10/22/22 0100 -- 76 14 215/93 Abnormal  133 92 % None (Room air) Lying       Pertinent Sustained Findings: (include times they were obtained)    Weight in Kilograms:  10/21/22 98 4 kg (217 lb)         Pertinent Labs (results):  Results from last 7 days   Lab Units 10/21/22  2108   SARS-COV-2   Negative              Results from last 7 days   Lab Units 10/24/22  0437 10/23/22  0706 10/22/22  0502 10/21/22  2108   WBC Thousand/uL 12 61* 14 91* 13 57* 13 39*   HEMOGLOBIN g/dL 9 0* 9 5* 10 1* 10 1*   HEMATOCRIT % 28 9* 29 3* 30 7* 31 1*   PLATELETS Thousands/uL 332 338 334 351   NEUTROS ABS Thousands/µL  --  12 49* 9 69* 9 17*                   Results from last 7 days   Lab Units 10/24/22  0437 10/23/22  0535 10/22/22  1420 10/22/22  0502 10/21/22  2108   SODIUM mmol/L 141 141 140 143 143   POTASSIUM mmol/L 2 8* 2 9* 2 8* 2 4* 2 4*   CHLORIDE mmol/L 105 104 102 103 102   CO2 mmol/L 28 27 30 32 31   ANION GAP mmol/L 8 10 8 8 10   BUN mg/dL 19 15 14 13 16   CREATININE mg/dL 1 44* 1 27 1 26 1 09 1 10   EGFR ml/min/1 73sq m 39 45 46 54 54   CALCIUM mg/dL 8 7 8 0* 8 7 8 2* 8 6   MAGNESIUM mg/dL  --  1 9 2 1 1 5* 1 6            Results from last 7 days   Lab Units 10/24/22  0437 10/21/22  2108   AST U/L 39 15   ALT U/L 26 15   ALK PHOS U/L 100 104   TOTAL PROTEIN g/dL 6 6 6 6   ALBUMIN g/dL 2 2* 2 3*   TOTAL BILIRUBIN mg/dL 0 24 0 19*                      Results from last 7 days   Lab Units 10/24/22  1104 10/24/22  0716 10/23/22  2125 10/23/22  1521 10/23/22  1128 10/23/22  0637 10/22/22  2125 10/22/22  1611 10/22/22  1104 10/22/22  0942 10/21/22  2009 10/21/22  1630   POC GLUCOSE mg/dl 178* 165* 197* 198* 161* 231* 165* 218* 312* 328* 165* 303*               Results from last 7 days   Lab Units 10/24/22  0437 10/23/22  0535 10/22/22  1420 10/22/22  0502 10/21/22  2108   GLUCOSE RANDOM mg/dL 86 222* 207* 203* 176*                   Results from last 7 days   Lab Units 10/22/22  0105 10/21/22  2316 10/21/22  2108   HS TNI 0HR ng/L  --   --  42   HS TNI 2HR ng/L  --  42  --    HSTNI D2 ng/L  --  0  --    HS TNI 4HR ng/L 39  --   --    HSTNI D4 ng/L -3  --   --                Results from last 7 days   Lab Units 10/21/22  2108   PROTIME seconds 13 2   INR   1 00   PTT seconds 28               Results from last 7 days   Lab Units 10/21/22  2108   PROCALCITONIN ng/ml 0 06           Results from last 7 days   Lab Units 10/21/22  2108   LACTIC ACID mmol/L 0 5                   Results from last 7 days   Lab Units 10/21/22  2108   NT-PRO BNP pg/mL 2,405*                                   Results from last 7 days   Lab Units 10/21/22  2128   CLARITY UA   Turbid   COLOR UA   Light Yellow   SPEC GRAV UA   >=1 030   PH UA   6 0   GLUCOSE UA mg/dl 250 (1/4%)*   KETONES UA mg/dl Negative   BLOOD UA   Large*   PROTEIN UA mg/dl >=300*   NITRITE UA   Negative   BILIRUBIN UA   Negative   UROBILINOGEN UA E U /dl 0 2   LEUKOCYTES UA   Negative   WBC UA /hpf Innumerable*   RBC UA /hpf Innumerable*   BACTERIA UA /hpf Innumerable*   EPITHELIAL CELLS WET PREP /hpf Moderate*           Results from last 7 days   Lab Units 10/21/22  2108   INFLUENZA A PCR   Negative   INFLUENZA B PCR   Negative   RSV PCR   Negative                                     Results from last 7 days   Lab Units 10/21/22  2128 10/21/22  2108 10/21/22  2107   BLOOD CULTURE    --  No Growth at 48 hrs  No Growth at 48 hrs  URINE CULTURE   >100,000 cfu/ml Escherichia coli*  --   --                 Radiology (results):  10/24:VAS lower limb arterial duplex, complete bilateral     Impression:  RIGHT LOWER LIMB:  Monophasic waveforms in the common femoral artery suggesting more proximal  disease  Diffuse atherosclerotic disease throughout the femoral-popliteal arteries  without focal stenosis  Findings suggests tibioperoneal disease  Ankle/Brachial index:  0 80, moderate claudication range  PVR/ PPG tracings are dampened  Metatarsal pressure of 64 mm Hg  Great toe pressure of 61 mm Hg, above healing threshold for a diabetic  LEFT LOWER LIMB:  Diffuse atherosclerotic disease throughout the femoral-popliteal arteries  without focal stenosis  Findings suggests tibioperoneal disease  Ankle/Brachial index:  0 63, severe claudication range  PVR/ PPG tracings are dampened    Metatarsal pressure of 21 mm Hg  Great toe pressure of 35 mm Hg, below healing threshold for a diabetic  CT lower extremity w contrast left   Final Result by Ryland Narayanan DO (10/22 0102)       No acute bony process is seen        Moderate superficial soft tissue edema in the foot, ankle, and leg, superimposed cellulitis considered in the appropriate clinical setting  Correlation with patient's symptoms and laboratory values recommended    No discrete drainable collection    identified        Other findings as above            Workstation performed: [id number]           XR chest 1 view portable   ED Interpretation by Toney Little PA-C (10/21 2203)   ED wet read:  Possible mild vascular congestion         Final Result by Adrien Ragsdale MD (46/74 2832)       No acute cardiopulmonary disease                        Workstation performed: [id number]           XR foot 3+ views RIGHT   Final Result by Adrien Ragsdale MD (10/22 8440)       No acute osseous abnormality            EKG (results): EKG   Ischemic changes inferolateral leads    Other tests (results):    Tests pending final results:    Treatment in the ED:   Medication Administration from 10/21/2022 1621 to 10/22/2022 0852       Date/Time Order Dose Route Action Action by Comments                10/21/2022 2119 EDT sodium chloride 0 9 % bolus 500 mL 500 mL Intravenous New Bag       10/21/2022 2123 EDT acetaminophen (TYLENOL) tablet 650 mg 650 mg Oral Given                  10/21/2022 2322 EDT vancomycin (VANCOCIN) 1,750 mg in sodium chloride 0 9 % 500 mL IVPB 1,750 mg Intravenous New Bag                  10/21/2022 2119 EDT piperacillin-tazobactam (ZOSYN) 4 5 g in sodium chloride 0 9 % 100 mL IVPB 4 5 g Intravenous New Bag       10/21/2022 2231 EDT potassium chloride (K-DUR,KLOR-CON) CR tablet 40 mEq 40 mEq Oral Given                  10/21/2022 2228 EDT potassium chloride 20 mEq IVPB (premix) 20 mEq Intravenous New Bag       10/21/2022 2250 EDT morphine injection 4 mg 4 mg Intravenous Given       10/21/2022 2303 EDT iohexol (OMNIPAQUE) 350 MG/ML injection (SINGLE-DOSE) 100 mL 100 mL Intravenous Given                  10/21/2022 2319 EDT sodium chloride 0 9 % bolus 250 mL 250 mL Intravenous New Bag       10/21/2022 2358 EDT lisinopril (ZESTRIL) tablet 40 mg 40 mg Oral Given       10/21/2022 2358 EDT amLODIPine (NORVASC) tablet 10 mg 10 mg Oral Given       10/22/2022 0224 EDT hydrALAZINE (APRESOLINE) injection 5 mg 5 mg Intravenous Given       10/22/2022 0304 EDT morphine injection 4 mg 4 mg Intravenous Given       10/22/2022 0458 EDT potassium chloride (K-DUR,KLOR-CON) CR tablet 40 mEq 40 mEq Oral Given                  10/22/2022 0503 EDT magnesium sulfate IVPB (premix) SOLN 1 g 1 g Intravenous New Bag       10/22/2022 0529 EDT hydrALAZINE (APRESOLINE) injection 15 mg 15 mg Intravenous Given                  10/22/2022 0617 EDT magnesium sulfate IVPB (premix) SOLN 1 g 1 g Intravenous New Bag       10/22/2022 0814 EDT potassium chloride 20 mEq IVPB (premix) 20 mEq Intravenous New Bag                  10/22/2022 0620 EDT ondansetron (ZOFRAN) injection 4 mg 4 mg Intravenous Given       10/22/2022 0631 EDT morphine injection 4 mg 4 mg Intravenous Given             Meds: Name, dose, route, time, number of doses given        Nebulizer treatments: Type, total number given      IVs: Type, rate, total amt  given          Other treatments:       Change in condition while in the ED:       Response to ED Treatment:           OBSERVATION: (Proceed to "ADMISSION" if Direct Admission)    Orders written during the observation period  Meds Name, dose, route, time, how may doses given:  PRN Meds Name, dose, route, time, how many doses given within the first 24 hrs :  IVs Type, rate, and total amt   ordered/given:  Labs, imaging, other:  Consults and findings:    Test Results during the observation period  Pertinent Lab tests (dates/results):  Culture results (blood, urine, spinal, wound, respiratory, etc ):  Imaging tests (dates/results):  EKG (dates/results): Other test (dates/results):  Tests pending (dates/results):    Surgical or Invasive Procedures during the observation period  Name of surgery/procedure:  Date & Time:  Patient Response:  Post-operative orders:  Operative Report/Findings:    Response to Treatment, Major Change in Condition, Major Charge in Treatment during the observation period          ADMISSION:    DIRECT Admissions Only:    · Presenting Signs/Symptoms:   ·   · Medication/treatment prior to arrival:  ·   · Past Medical History:  ·   · Clinical Exam on admission:  ·   · Vital Signs on admission: (Temp, Pulse, Resp, and BP)  ·   · Weight in kilograms:     ALL Admissions:    Admission Orders and Other Orders written within the first 24 hrs after admission  IP CONSULT TO PODIATRY    Meds Name, dose, route, time, how may doses given:  amLODIPine, 10 mg, Oral, Daily  aspirin, 81 mg, Oral, Daily  carvedilol, 12 5 mg, Oral, BID With Meals  gabapentin, 200 mg, Oral, BID  heparin (porcine), 5,000 Units, Subcutaneous, Q8H [facility]  insulin glargine, 60 Units, Subcutaneous, HS  insulin lispro, 1-6 Units, Subcutaneous, 4x Daily (AC & HS)  piperacillin-tazobactam, 3 375 g, Intravenous, Q6H  potassium chloride, 20 mEq, Intravenous, Once  sertraline, 100 mg, Oral, Daily        Continuous IV Infusions:  IVF  75 /hr - d/c   10/24  PRN Meds:  acetaminophen, 650 mg, Oral, Q6H PRN  ALPRAZolam, 0 25 mg, Oral, Daily PRN  hydrALAZINE, 10 mg, Intravenous, Q6H PRN  metoclopramide, 5 mg, Intravenous, Q6H PRN  ( x1  10/22 and X 1 10/23)    morphine injection, 4 mg, Intravenous, Q4H PRN  ( X 3  10/22 and  x1  10/24 thus far)  ondansetron, 4 mg, Intravenous, Q6H PRN  ( x3  10/22)    oxyCODONE, 5 mg, Oral, Q4H PRN       PRN Meds Name, dose, route, time, how many doses given within the first 24 hrs :  IVs Type, rate, and total amt  ordered/given:  Labs, imaging, other:      Consults and findings:  Assessment and plan  1  Non healing left heel wound  2  Depression, recurrent ([facility] [address] )  3  Type 2 diabetes mellitus with hyperglycemia, with long-term current use of insulin ([facility] [address] )  4  Wound of lower extremity  5    N&V (nausea and vomiting)  6  Sepsis ([facility] )  7  Abnormal EKG  8  Hypertensive urgency  9  Hypokalemia     • Heel wound  Follow-up on blood cultures  Appreciate podiatry evaluation  Continue Zosyn  • Hypertension  Will change metoprolol to carvedilol for better blood pressure control  Continue amlodipine and lisinopril  • Diabetes mellitus  Continue glargine 60 units with sliding scale  • Hypokalemia  Secondary to vomiting  Will replete with IV fluids    Podiatry - Consultation     Patient Information:   Mian Sequeira 65 y o  female MRN: [id number]  Unit/Bed#: E2 -01 Encounter: [id number]  PCP: ALEX Rausch  Date of Admission:  10/21/2022  Date of Consultation: 10/22/22  Requesting Physician: Yajaira Gee DO        ASSESSMENT:     Mian Sequeira is a 65 y o  female with:     1  L heel wound  2  T2DM  3  Obesity     PLAN:     • Plan to continue local wound care of L heel and R 5th digit  Betadine JED applied to R 5th digit and betadine DSD to L heel  Will monitor for any progression to wet gangrene  • CT of L foot reviewed: no evidence of acute bony process or LELO  • XR of R foot reviewed: no acute osseous abnormality of LELO  • Wound care instructions placed  Appreciate nursing assistance with dressing changes  • Acute leukocytosis - WBC 13 57  Continue IV abx per primary team  Will trend labs/vitals  • Pulses are non palpable and ischemic changes noted to R 5th digit which will likely continue to demarcate  F/u LEADs  Recommend vascular consult  • Elevation and offloading on green foam wedges or pillows when non-ambulatory  • Rest of care per primary team   • Will discuss this plan with my attending and update as needed    Consultation - Nephrology   Mian Sequeira 65 y o  female MRN: [id number]  Unit/Bed#: E2 MS 232-01 Encounter: [id number]     ASSESSMENT/PLAN:  Acute kidney injury:  Suspect multifactorial etiology in the setting of sepsis, urinary tract infection, Ace inhibition, and possible contrast associated nephropathy +/-progression to ATN  -baseline creatinine appears to be around 0 7-1 1 since 2020    -creatinine was 1 1 upon admission   -creatinine with acute rise to 1 44     -status post IV contrast on 10/21/2022   -will hold ACE-inhibitor   -will check bladder scan   -UA:  Glucose, large blood, greater than 300 protein, innumerable rbc's/wbc's/bacteria   -most recent renal imaging with 1 or more simple renal cysts, otherwise unremarkable   -continue to avoid nephrotoxins, hypotension, IV contrast   -strict I/O      Hypertension:  Presented with hypertensive urgency  Patient admits to not always taking her medication due to cost   Blood pressure is above goal at times  -home medications:  Amlodipine 10 mg daily, lisinopril 40 mg daily, metoprolol succinate 25 mg daily  -would hold lisinopril for now  -recommend holding parameters antihypertensive for systolic blood pressure less than 130       Hypokalemia:  Suspect due to vomiting   -Mag level 2 0   -will continue to monitor and replace as needed      Urinary tract infection:  Cultures positive for E coli  -currently on antibiotics      Nonhealing left foot wound/PAD:  -podiatry and vascular  team following  Per Podiatry note, no indication for surgical intervention at this time  Will need follow-up MRI of foot  -currently on antibiotics  -would recommend pre and post hydration of further IV contrast is needed      Anemia:  -will check iron panel, although would avoid IV Venofer in the setting of infection   -continue to monitor and transfuse as needed for hemoglobin less than 7 0      Other:  Diabetes, depression      Inpatient consult to Vascular Surgery  Consult performed by: Sophia Dumont PA-C  Consult ordered by: Koko Lutz DPM        PAD (peripheral artery disease) ([facility] [address] [address] )  Assessment & Plan  65year old female former smoker w/HTN, HLD, uncontrolled type II DM (A1c 9 8), hx CVA, presenting with nonhealing extensive L heel ulceration and R hallux and 5th digit ulceration  Vascular surgery consulted for input       TARI duplex 10/24/22: Rt- monophasic waveforms of the CFA indicative of proximal disease, SHARIF 0 80/64/61  Lt- diffuse disease without focal stenosis, SHARIF 0 61/21/35     sCr/eGFR: 1 43/39  ABC: 12 49      Recommendations:  -Bilateral tissue loss in the setting of uncontrolled type II DM and NYDIA on admission  -Diminished femoral pulses bilaterally on exam  -Extensive L heel tissue loss with R hallux and 5th digit tissue loss  Patient will likely require bilateral intervention  Questionable salvageability of the LLE   -Given diminished femoral pulses bilaterally, recommend CTA of the abdomen with run off  With NYDIA on admission, will ask for Nephrology assistance for optimization prior  -Medical management with ASA, will add statin to medical regimen  -Continue local wound care per podiatry  -Medical management and glucose control per SLIM  -Follow up Nephrology recommendations for CTA preparation   -D/w Dr Romero Smoke Results after admission  Pertinent Lab tests (dates/results):  Culture results (blood, urine, spinal, wound, respiratory, etc ):  Imaging tests (dates/results):  EKG (dates/results):   Other test (dates/results):  Tests pending (dates/results):    Surgical or Invasive Procedures  Name of surgery/procedure:  Date & Time:  Patient Response:  Post-operative orders:  Operative Report/Findings:    Response to Treatment, Major Change in Condition, Major Charge in Treatment anytime during admission    Disposition on Discharge  Home, Rehab, SNF, LTC, Shelter, etc : Home/Self Care    Cease to Breathe (CTB)  If a patient expires during an admission, in addition to the above information, please include:    Summary/timeline of the patient's decline in condition:    Medications and treatment:    Patient response to treatment:    Date and time patient ceased to breathe:        Is there a Readmission that follows this admission? Yes X No    If yes, reason for denial:          InterQual Review    InterQual Criteria Met:  Yes X No  N/A        Please include the InterQual Review, InterQual year/version used, and the criteria selected:   Criteria Set Name - Subset   LOC:Acute Adult-Infection: Skin      Criteria Review   REVIEW SUMMARY     InterQual® Review Status: In Primary  Review Type: Admission  Criteria Status: Not Met  Day of review: Episode Day 1  Condition Specific: Yes        REVIEW DETAILS     Product: Haily Saab Adult  Subset: Infection: Skin        Select Day, One:          [  ] Episode Day 1, One:              [  ] ACUTE, >= One:                  [  ] Diabetic foot ulcer and, Both:                      [  ] Intervention, All:                          [X] Anti-infective                          [X] Assessment of vascular supply, One:                              [X] Scheduled or performed within 24h and, >= One:                                  [X] Non-invasive evaluation, >= One:                                      [X] Arterial Doppler study                          [X] Imaging study to rule out osteomyelitis, >= One:                              [X] Plain radiograph                          [X] Surgical evaluation for debridement                          [X] Wound assessment and care        Version: InterQual® 2022, Oct  2022 Release             PLEASE SUBMIT THE COMPLETED FORM TO [address] [place] [address] [phone] or VIA E-MAIL TO [email or web address]    Signature: Juan Aguilar Date:  01/05/23    Confidentiality Notice: The documents accompanying this telecopy may contain confidential information belonging to the sender    The information is intended only for the use of the individual named above  If you are not the intended recipient, you are hereby notified  That any disclosure, copying, distribution or taking of any telecopy is strictly prohibited

## 2023-01-05 NOTE — PHYSICAL THERAPY NOTE
Physical Therapy Cancellation Note       01/05/23 3085   PT Last Visit   PT Visit Date 01/05/23   Note Type   Note Type Cancelled Session   Cancel Reasons Other  (Attempted to see pt multiple times t/o day, initially in with nursing staff, then grossly incontinent of bowel   Will continue to follow per PT POC, re-eval to be completed)       Joseline Ruiz, PT

## 2023-01-05 NOTE — PROGRESS NOTES
[facility]  [facility] 1960, 65 y [facility]  female MRN: [id number]  Unit/Bed#: E4 -01 Encounter: [id number]  Primary Care Provider: ALEX Leiva   Date and time admitted to hospital: 10/21/2022  7:58 PM    * PAD (peripheral artery disease) [facility])  Assessment & Plan  65year old female former smoker w/HTN, HLD, uncontrolled type II DM (A1c 9 8), hx CVA, presenting with nonhealing extensive L heel ulceration and R hallux and 5th digit ulceration  She underwent multiple endovascular interventions and surgeries  She is now S/P bilateral AKA's  S/p multiple b/l endovascular interventions  JUSTIN  R hemispheric CVA    S/p L AKA 11/23/22 [facility])  S/p R AKA, wound debridement 12/1/22 [facility])  S/p R AKA wound/stump washout and VAC placement 12/16/22 [facility])  S/p R AKA wound/stump VAC change, L AKA stump washout/packing 12/19/22 ([facility])  Wound Cx: enterobacter cloacae     INR 2 56    Recommendations:  - Bilateral tissue loss in the setting of uncontrolled type II DM and NYDIA on admission, now s/p bilateral AKA's as above followed by wound debridements  - Endovascular interventions, c/b atheroembolic event to the RLE and JUSTIN with R hemispheric CVA  - Local wound care to bilateral AKA's daily    • R wounds cleanse with saline; apply wet to dry dressing and cover with ABD, Rachid and ABD    • R medial thigh apply moisturizer to skin    • L AKA cleanse with saline; peroxide and leave to open air   - Appreciate ID for assistance with abx- L stump cultures growing out enterobacter cloacae    Patient completed doxycycline on 1/2/23  - Incentive spirometer   - Continue statin, Plavix and warfarin; daily INR and dosage of warfarin  - Medical management and glucose control per SLIM  - Pain management as per SLIM  - Appreciate psych input and management - addition of wellbutrin and adjustment of zoloft   - Disposition planning for long-term care placement; CM is working on placement, appreciate assistance   - Will d/w Dr Payam Calderon      Subjective: Patient seen and examined  Offers no complaints  Awaiting placement per case management    Vitals:  /91 (BP Location: Left arm)   Pulse 77   Temp (!) 97 2 °F (36 2 °C) (Temporal)   Resp 18   Ht 5' 4" (1 626 m)   Wt 68 5 kg (151 lb 0 2 oz)   SpO2 94%   BMI 25 92 kg/m²     I/Os:  No intake/output data recorded  No intake/output data recorded  Lab Results and Cultures:   CBC with diff: Lab Results   Component Value Date    WBC 10 68 (H) 01/05/2023    HGB 9 4 (L) 01/05/2023    HCT 32 5 (L) 01/05/2023    MCV 86 01/05/2023     (H) 01/05/2023    MCH 24 9 (L) 01/05/2023    MCHC 28 9 (L) 01/05/2023    RDW 16 9 (H) 01/05/2023    MPV 8 5 (L) 01/05/2023    NRBC 0 12/27/2022   ,   BMP/CMP:  Lab Results   Component Value Date    SODIUM 141 12/24/2022    K 3 6 12/24/2022     12/24/2022    CO2 29 12/24/2022    BUN 8 12/24/2022    CREATININE 0 65 12/24/2022    CALCIUM 8 1 (L) 12/24/2022    AST 27 12/22/2022    ALT 13 12/22/2022    ALKPHOS 167 (H) 12/22/2022    EGFR 96 12/24/2022   ,   Lipid Panel: No results found for: CHOL,   Coags:   Lab Results   Component Value Date    PTT 95 (H) 12/12/2022    INR 2 56 (H) 01/05/2023   ,     Blood Culture:   Lab Results   Component Value Date    BLOODCX No Growth After 5 Days   11/13/2022    BLOODCX Staphylococcus coagulase negative (A) 11/13/2022   ,   Urinalysis: Lab Results   Component Value Date    COLORU Yellow 11/12/2022    CLARITYU Slightly Cloudy 11/12/2022    SPECGRAV 1 025 11/12/2022    PHUR 5 5 11/12/2022    PHUR 6 5 02/23/2018    LEUKOCYTESUR Trace (A) 11/12/2022    NITRITE Positive (A) 11/12/2022    GLUCOSEU Negative 11/12/2022    KETONESU Negative 11/12/2022    BILIRUBINUR Negative 11/12/2022    BLOODU Large (A) 11/12/2022   ,   Urine Culture:   Lab Results   Component Value Date    URINECX >100,000 cfu/ml Enterobacter cloacae complex (A) 11/12/2022    Lorena Sanders 70,000-79,000 cfu/ml Klisabelomyces ljanus (A) 11/12/2022   ,   Wound Culure: No results found for: WOUNDCULT    Medications:  Current Facility-Administered Medications   Medication Dose Route Frequency   • acetaminophen (TYLENOL) tablet 650 mg  650 mg Oral Q6H PRN   • ALPRAZolam (XANAX) tablet 0 25 mg  0 25 mg Oral Daily PRN   • aluminum-magnesium hydroxide-simethicone (MYLANTA) oral suspension 30 mL  30 mL Oral Q4H PRN   • amLODIPine (NORVASC) tablet 10 mg  10 mg Oral Daily   • atorvastatin (LIPITOR) tablet 40 mg  40 mg Oral Daily With Dinner   • buPROPion (WELLBUTRIN XL) 24 hr tablet 150 mg  150 mg Oral Daily   • clopidogrel (PLAVIX) tablet 75 mg  75 mg Oral Daily   • collagenase (SANTYL) ointment   Topical Daily   • doxazosin (CARDURA) tablet 4 mg  4 mg Oral HS   • ferrous sulfate tablet 325 mg  325 mg Oral Daily With Lunch   • gabapentin (NEURONTIN) capsule 400 mg  400 mg Oral TID   • insulin glargine (LANTUS) subcutaneous injection 10 Units 0 1 mL  10 Units Subcutaneous HS   • insulin lispro (HumaLOG) 100 units/mL subcutaneous injection 1-6 Units  1-6 Units Subcutaneous TID AC   • labetalol (NORMODYNE) injection 10 mg  10 mg Intravenous Q6H PRN   • lisinopril (ZESTRIL) tablet 40 mg  40 mg Oral Daily   • methocarbamol (ROBAXIN) tablet 1,000 mg  1,000 mg Oral Q8H [address]   • metoclopramide (REGLAN) injection 10 mg  10 mg Intravenous Q6H PRN   • metoprolol succinate (TOPROL-XL) 24 hr tablet 100 mg  100 mg Oral Daily   • moisture barrier miconazole 2% cream (aka GINA MOISTURE BARRIER ANTIFUNGAL CREAM)   Topical BID   • naloxone (NARCAN) 0 04 mg/mL syringe 0 04 mg  0 04 mg Intravenous Q1MIN PRN   • ondansetron (ZOFRAN) injection 4 mg  4 mg Intravenous Q6H PRN   • oxyCODONE (OxyCONTIN) 12 hr tablet 10 mg  10 mg Oral Q12H MICHELLE   • oxyCODONE (ROXICODONE) IR tablet 2 5 mg  2 5 mg Oral Q4H PRN    Or   • oxyCODONE (ROXICODONE) IR tablet 5 mg  5 mg Oral Q4H PRN    Or   • oxyCODONE (ROXICODONE) IR tablet 7 5 mg  7 5 mg Oral Q4H PRN   • pantoprazole (PROTONIX) EC tablet 40 mg  40 mg Oral Early Morning   • polyethylene glycol (MIRALAX) packet 17 g  17 g Oral BID   • potassium chloride (K-DUR,KLOR-CON) CR tablet 20 mEq  20 mEq Oral Daily   • QUEtiapine (SEROquel) tablet 25 mg  25 mg Oral HS   • senna (SENOKOT) tablet 8 6 mg  1 tablet Oral BID   • sertraline (ZOLOFT) tablet 175 mg  175 mg Oral Daily   • warfarin (COUMADIN) tablet 5 mg  5 mg Oral Daily (warfarin)       Imaging:  Reviewed    Physical Exam:    General: Alert and oriented, in no acute distress  CV: Regular rate   Respiratory: Normal effort   Abdominal: Soft, non-distended   Extremities: S/p bilateral AKA  Neurologic: Grossly normal      Fabian Reveles PA-C  1/5/2023

## 2023-01-05 NOTE — CASE MANAGEMENT
Case Management Discharge Planning Note    Patient name Amber Begin  Location [place] [address] [address]/E4 [address] [facility]-* MRN [id number]  : 1960 Date 2023       Current Admission Date: 10/21/2022  Current Admission Diagnosis:PAD (peripheral artery disease) [facility])   Patient Active Problem List    Diagnosis Date Noted   • Mild protein-calorie malnutrition ([address] ) 2022   • Diarrhea 2022   • CVA (cerebral vascular accident) ([address] ) 2022   • Febrile 2022   • HIT (heparin-induced thrombocytopenia) 2022   • Diabetic ulcer of heel associated with diabetes mellitus due to underlying condition ([address] ) 11/10/2022   • Acute on chronic anemia 10/30/2022   • Generalized edema due to fluid overload 10/27/2022   • PAD (peripheral artery disease) ([address] ) 10/25/2022   • Non healing left heel wound 10/22/2022   • Hypertensive urgency 10/22/2022   • Hypokalemia 10/22/2022   • Wound of lower extremity 10/21/2022   • Epigastric pain 2022   • Type 2 diabetes mellitus with hyperglycemia, with long-term current use of insulin ([address] ) 10/18/2021   • Hyperparathyroidism ([address] ) 10/18/2021   • 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 ([address] ) 2021   • 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   • Moderate protein-calorie malnutrition ([address] ) 2020   • Acute colitis 2020   • Arthritis 2019   • Depression, recurrent ([address] ) 2018   • Class 3 severe obesity due to excess calories with serious comorbidity and body mass index (BMI) of 40 0 to 44 9 in adult [facility]) 2018   • Esophageal reflux 2018   • Uncontrolled type 2 diabetes with neuropathy 2018   • Diabetic peripheral neuropathy ([address] )    • Systolic murmur    • Stroke ([address] ) 2015   • Anxiety 2015   • Vitamin D deficiency 2015   • Benign essential hypertension 2012   • Familial combined hyperlipidemia 2012      LOS (days): 75  Geometric Mean LOS (GMLOS) (days):   Days to GMLOS:     OBJECTIVE:  Risk of Unplanned Readmission Score: 33 24         Current admission status: Inpatient   Preferred Pharmacy:   [id number], [address]  [address]  [facility] [address]  Phone: [phone] Fax: [phone]    Primary Care Provider: Martyn Kayser, CRNP    Primary Insurance: BLUE CROSS  Secondary Insurance: [place] [employer]    DISCHARGE DETAILS:    Discharge planning discussed with[de-identified] Pt & dtr, Mendel Nascimento        CM contacted family/caregiver?: Yes  Were Treatment Team discharge recommendations reviewed with patient/caregiver?: Yes  Did patient/caregiver verbalize understanding of patient care needs?: N/A- going to facility  Were patient/caregiver advised of the risks associated with not following Treatment Team discharge recommendations?: Yes    Contacts  Patient Contacts: Shala Maddox (dtr)  Relationship to Patient[de-identified] Family  Contact Method: Phone  Phone Number: [phone]  Reason/Outcome: Emergency Contact, Discharge Planning, Referral                        Treatment Team Recommendation: Short Term Rehab  Discharge Destination Plan[de-identified] Short Term Rehab                                         Additional Comments: CM met w/ pt at bedside & called dtr to updated them on current status of discharge  [facility] can no longer accept pt at this time due to her BLUE CROSS terminating on 12/31 & her [address] not starting until 1/15  pt does currently have temporary coverage w/ [employer]  [facility] are currently reviewing pt at this time  Pt & dtr deny having any other questions at this time  CM to continue to follow pt care & d/c

## 2023-01-06 DIAGNOSIS — E78.2 MIXED HYPERLIPIDEMIA: ICD-10-CM

## 2023-01-06 LAB
GLUCOSE SERPL-MCNC: 118 MG/DL (ref 65–140)
GLUCOSE SERPL-MCNC: 129 MG/DL (ref 65–140)
GLUCOSE SERPL-MCNC: 158 MG/DL (ref 65–140)
GLUCOSE SERPL-MCNC: 165 MG/DL (ref 65–140)
INR PPP: 2.22 (ref 0.84–1.19)
PROTHROMBIN TIME: 24.5 SECONDS (ref 11.6–14.5)

## 2023-01-06 PROCEDURE — 97110 THERAPEUTIC EXERCISES: CPT

## 2023-01-06 PROCEDURE — 99024 POSTOP FOLLOW-UP VISIT: CPT | Performed by: SURGERY

## 2023-01-06 PROCEDURE — 85610 PROTHROMBIN TIME: CPT | Performed by: PHYSICIAN ASSISTANT

## 2023-01-06 PROCEDURE — 97530 THERAPEUTIC ACTIVITIES: CPT

## 2023-01-06 PROCEDURE — 82948 REAGENT STRIP/BLOOD GLUCOSE: CPT

## 2023-01-06 RX ORDER — ATORVASTATIN CALCIUM 40 MG/1
TABLET, FILM COATED ORAL
Qty: 90 TABLET | Refills: 1 | Status: SHIPPED | OUTPATIENT
Start: 2023-01-06

## 2023-01-06 RX ADMIN — LISINOPRIL 40 MG: 20 TABLET ORAL at 09:22

## 2023-01-06 RX ADMIN — INSULIN GLARGINE 10 UNITS: 100 INJECTION, SOLUTION SUBCUTANEOUS at 21:45

## 2023-01-06 RX ADMIN — AMLODIPINE BESYLATE 10 MG: 10 TABLET ORAL at 09:22

## 2023-01-06 RX ADMIN — CLOPIDOGREL BISULFATE 75 MG: 75 TABLET ORAL at 09:22

## 2023-01-06 RX ADMIN — METHOCARBAMOL 1000 MG: 500 TABLET ORAL at 21:45

## 2023-01-06 RX ADMIN — FERROUS SULFATE TAB 325 MG (65 MG ELEMENTAL FE) 325 MG: 325 (65 FE) TAB at 12:39

## 2023-01-06 RX ADMIN — SERTRALINE HYDROCHLORIDE 175 MG: 100 TABLET ORAL at 09:21

## 2023-01-06 RX ADMIN — INSULIN LISPRO 1 UNITS: 100 INJECTION, SOLUTION INTRAVENOUS; SUBCUTANEOUS at 17:03

## 2023-01-06 RX ADMIN — GABAPENTIN 400 MG: 400 CAPSULE ORAL at 21:45

## 2023-01-06 RX ADMIN — BUPROPION HYDROCHLORIDE 150 MG: 150 TABLET, FILM COATED, EXTENDED RELEASE ORAL at 09:22

## 2023-01-06 RX ADMIN — METHOCARBAMOL 1000 MG: 500 TABLET ORAL at 05:56

## 2023-01-06 RX ADMIN — ATORVASTATIN CALCIUM 40 MG: 40 TABLET, FILM COATED ORAL at 17:03

## 2023-01-06 RX ADMIN — METOPROLOL SUCCINATE 100 MG: 100 TABLET, FILM COATED, EXTENDED RELEASE ORAL at 09:21

## 2023-01-06 RX ADMIN — INSULIN LISPRO 1 UNITS: 100 INJECTION, SOLUTION INTRAVENOUS; SUBCUTANEOUS at 12:29

## 2023-01-06 RX ADMIN — SENNOSIDES 8.6 MG: 8.6 TABLET, FILM COATED ORAL at 09:22

## 2023-01-06 RX ADMIN — GABAPENTIN 400 MG: 400 CAPSULE ORAL at 17:02

## 2023-01-06 RX ADMIN — INSULIN LISPRO 1 UNITS: 100 INJECTION, SOLUTION INTRAVENOUS; SUBCUTANEOUS at 09:23

## 2023-01-06 RX ADMIN — WARFARIN SODIUM 5 MG: 5 TABLET ORAL at 17:03

## 2023-01-06 RX ADMIN — QUETIAPINE FUMARATE 25 MG: 25 TABLET ORAL at 21:45

## 2023-01-06 RX ADMIN — SENNOSIDES 8.6 MG: 8.6 TABLET, FILM COATED ORAL at 17:02

## 2023-01-06 RX ADMIN — MICONAZOLE NITRATE: 20 CREAM TOPICAL at 09:30

## 2023-01-06 RX ADMIN — GABAPENTIN 400 MG: 400 CAPSULE ORAL at 09:22

## 2023-01-06 RX ADMIN — POTASSIUM CHLORIDE 20 MEQ: 1500 TABLET, EXTENDED RELEASE ORAL at 09:22

## 2023-01-06 RX ADMIN — POLYETHYLENE GLYCOL 3350 17 G: 17 POWDER, FOR SOLUTION ORAL at 09:23

## 2023-01-06 RX ADMIN — METHOCARBAMOL 1000 MG: 500 TABLET ORAL at 13:22

## 2023-01-06 RX ADMIN — MICONAZOLE NITRATE: 20 CREAM TOPICAL at 17:13

## 2023-01-06 RX ADMIN — PANTOPRAZOLE SODIUM 40 MG: 40 TABLET, DELAYED RELEASE ORAL at 05:56

## 2023-01-06 RX ADMIN — OXYCODONE HYDROCHLORIDE 10 MG: 10 TABLET, FILM COATED, EXTENDED RELEASE ORAL at 09:22

## 2023-01-06 RX ADMIN — DOXAZOSIN 4 MG: 4 TABLET ORAL at 21:45

## 2023-01-06 NOTE — PLAN OF CARE
Problem: OCCUPATIONAL THERAPY ADULT  Goal: Performs self-care activities at highest level of function for planned discharge setting  See evaluation for individualized goals  Description: Treatment Interventions: ADL retraining, Functional transfer training, UE strengthening/ROM, Endurance training, Cognitive reorientation, Patient/family training, Equipment evaluation/education, Compensatory technique education, Energy conservation, Activityengagement          See flowsheet documentation for full assessment, interventions and recommendations  Outcome: Progressing  Note: Limitation: Decreased ADL status, Decreased UE strength, Decreased Safe judgement during ADL, Decreased cognition, Decreased endurance, Decreased high-level ADLs  Prognosis: Fair  Assessment: Pt seen for OT tx session w/ focus on rolling, UE strengthening, and activity tolerance  Improvement noted in all areas of focus this session  Pt able to roll w/ S/Min to assist staff w/ hygiene needs and relieve pressure from back  Denies pain  Tolerated UE exercises using Red theraband to improve strength for transfers and balance  Unable to pull self from supine into long sit this day s/p exercises  States her goal is to perform SB transfers into WC  Educated pt on needing to improve strength and balance  Acknowledged  Pt noted to have some delayed processing and difficulties locating objects but states her glasses don't help  Alert and oriented  Continues to make slow progress toward goals  Would benefit from rehab to maximize rehab potential and achieve optimal performance       OT Discharge Recommendation: Post acute rehabilitation services

## 2023-01-06 NOTE — PROGRESS NOTES
[facility]  [facility] 1960, 65 y [facility]  female MRN: [id number]  Unit/Bed#: E4 -01 Encounter: [id number]  Primary Care Provider: ALEX Medrano   Date and time admitted to hospital: 10/21/2022  7:58 PM    * PAD (peripheral artery disease) [facility])  Assessment & Plan  65year old female former smoker w/HTN, HLD, uncontrolled type II DM (A1c 9 8), hx CVA, presenting with nonhealing extensive L heel ulceration and R hallux and 5th digit ulceration  She underwent multiple endovascular interventions and surgeries  She is now S/P bilateral AKA's  S/p multiple b/l endovascular interventions  JUSTIN  R hemispheric CVA    S/p L AKA 11/23/[facility])  S/p R AKA, wound debridement 12/1/22 [facility])  S/p R AKA wound/stump washout and VAC placement 12/16/22 [facility])  S/p R AKA wound/stump VAC change, L AKA stump washout/packing 12/19/22 ([facility])  Wound Cx: enterobacter cloacae     Recommendations:  - Bilateral tissue loss in the setting of uncontrolled type II DM and NYDIA on admission, now s/p bilateral AKA's as above followed by wound debridements  - Endovascular interventions, c/b atheroembolic event to the RLE and JUSTIN with R hemispheric CVA  - Local wound care to bilateral AKA's daily    • R wounds cleanse with saline; apply wet to dry dressing and cover with ABD, Rachid and ABD    • R medial thigh apply moisturizer to skin    • L AKA cleanse with saline; peroxide and leave to open air   - Appreciate ID for assistance with abx- L stump cultures growing out enterobacter cloacae    Patient completed doxycycline on 1/2/23  - Continue statin, Plavix and warfarin; daily INR and dosage of warfarin  - Medical management and glucose control per SLIM  - Pain management  - Appreciate psych input and management - addition of wellbutrin and adjustment of zoloft   - Disposition planning for long-term care placement; CM is working on placement, appreciate assistance   - Will d/w Dr Monique Fleming      Subjective: Patient seen and examined  Offers no complaints  No events overnight    Vitals:  /74 (BP Location: Left arm)   Pulse 77   Temp (!) 97 3 °F (36 3 °C) (Temporal)   Resp 20   Ht 5' 4" (1 626 m)   Wt 65 8 kg (145 lb 1 oz)   SpO2 98%   BMI 24 90 kg/m²     I/Os:  No intake/output data recorded  No intake/output data recorded  Lab Results and Cultures:   CBC with diff: Lab Results   Component Value Date    WBC 10 68 (H) 01/05/2023    HGB 9 4 (L) 01/05/2023    HCT 32 5 (L) 01/05/2023    MCV 86 01/05/2023     (H) 01/05/2023    MCH 24 9 (L) 01/05/2023    MCHC 28 9 (L) 01/05/2023    RDW 16 9 (H) 01/05/2023    MPV 8 5 (L) 01/05/2023    NRBC 0 12/27/2022   ,   BMP/CMP:  Lab Results   Component Value Date    SODIUM 141 12/24/2022    K 3 6 12/24/2022     12/24/2022    CO2 29 12/24/2022    BUN 8 12/24/2022    CREATININE 0 65 12/24/2022    CALCIUM 8 1 (L) 12/24/2022    AST 27 12/22/2022    ALT 13 12/22/2022    ALKPHOS 167 (H) 12/22/2022    EGFR 96 12/24/2022   ,   Lipid Panel: No results found for: CHOL,   Coags:   Lab Results   Component Value Date    PTT 95 (H) 12/12/2022    INR 2 56 (H) 01/05/2023   ,     Blood Culture:   Lab Results   Component Value Date    BLOODCX No Growth After 5 Days   11/13/2022    BLOODCX Staphylococcus coagulase negative (A) 11/13/2022   ,   Urinalysis: Lab Results   Component Value Date    COLORU Yellow 11/12/2022    CLARITYU Slightly Cloudy 11/12/2022    SPECGRAV 1 025 11/12/2022    PHUR 5 5 11/12/2022    PHUR 6 5 02/23/2018    LEUKOCYTESUR Trace (A) 11/12/2022    NITRITE Positive (A) 11/12/2022    GLUCOSEU Negative 11/12/2022    KETONESU Negative 11/12/2022    BILIRUBINUR Negative 11/12/2022    BLOODU Large (A) 11/12/2022   ,   Urine Culture:   Lab Results   Component Value Date    URINECX >100,000 cfu/ml Enterobacter cloacae complex (A) 11/12/2022    URINECX 70,000-79,000 cfu/ml Kluyveromyces marxianus (A) 11/12/2022   ,   Wound Culure: No results found for: WOUNDCULT    Medications:  Current Facility-Administered Medications   Medication Dose Route Frequency   • acetaminophen (TYLENOL) tablet 650 mg  650 mg Oral Q6H PRN   • ALPRAZolam (XANAX) tablet 0 25 mg  0 25 mg Oral Daily PRN   • aluminum-magnesium hydroxide-simethicone (MYLANTA) oral suspension 30 mL  30 mL Oral Q4H PRN   • amLODIPine (NORVASC) tablet 10 mg  10 mg Oral Daily   • atorvastatin (LIPITOR) tablet 40 mg  40 mg Oral Daily With Dinner   • buPROPion (WELLBUTRIN XL) 24 hr tablet 150 mg  150 mg Oral Daily   • clopidogrel (PLAVIX) tablet 75 mg  75 mg Oral Daily   • collagenase (SANTYL) ointment   Topical Daily   • doxazosin (CARDURA) tablet 4 mg  4 mg Oral HS   • ferrous sulfate tablet 325 mg  325 mg Oral Daily With Lunch   • gabapentin (NEURONTIN) capsule 400 mg  400 mg Oral TID   • insulin glargine (LANTUS) subcutaneous injection 10 Units 0 1 mL  10 Units Subcutaneous HS   • insulin lispro (HumaLOG) 100 units/mL subcutaneous injection 1-6 Units  1-6 Units Subcutaneous TID AC   • labetalol (NORMODYNE) injection 10 mg  10 mg Intravenous Q6H PRN   • lisinopril (ZESTRIL) tablet 40 mg  40 mg Oral Daily   • methocarbamol (ROBAXIN) tablet 1,000 mg  1,000 mg Oral Q8H [address]   • metoclopramide (REGLAN) injection 10 mg  10 mg Intravenous Q6H PRN   • metoprolol succinate (TOPROL-XL) 24 hr tablet 100 mg  100 mg Oral Daily   • moisture barrier miconazole 2% cream (aka GINA MOISTURE BARRIER ANTIFUNGAL CREAM)   Topical BID   • naloxone (NARCAN) 0 04 mg/mL syringe 0 04 mg  0 04 mg Intravenous Q1MIN PRN   • ondansetron (ZOFRAN) injection 4 mg  4 mg Intravenous Q6H PRN   • oxyCODONE (OxyCONTIN) 12 hr tablet 10 mg  10 mg Oral Q12H MICHELLE   • oxyCODONE (ROXICODONE) IR tablet 2 5 mg  2 5 mg Oral Q4H PRN    Or   • oxyCODONE (ROXICODONE) IR tablet 5 mg  5 mg Oral Q4H PRN    Or   • oxyCODONE (ROXICODONE) IR tablet 7 5 mg  7 5 mg Oral Q4H PRN   • pantoprazole (PROTONIX) EC tablet 40 mg  40 mg Oral Early Morning   • polyethylene glycol (MIRALAX) packet 17 g  17 g Oral BID   • potassium chloride (K-DUR,KLOR-CON) CR tablet 20 mEq  20 mEq Oral Daily   • QUEtiapine (SEROquel) tablet 25 mg  25 mg Oral HS   • senna (SENOKOT) tablet 8 6 mg  1 tablet Oral BID   • sertraline (ZOLOFT) tablet 175 mg  175 mg Oral Daily   • warfarin (COUMADIN) tablet 5 mg  5 mg Oral Daily (warfarin)       Imaging:  Reviewed    Physical Exam:    General: Alert and oriented  In no acute distress  CV: Regular rate   Respiratory: Normal effort   Abdominal: Soft, non-distended   Extremities: S/p Bilateral AKA  Dressing in place to the R AKA stump     Neurologic: Grossly normal       Leidy Aguillon PA-C  1/6/2023

## 2023-01-06 NOTE — OCCUPATIONAL THERAPY NOTE
Occupational Therapy Progress Note     Patient Name: Chester GAMBLE'S Date: 1/6/2023  Problem List  Principal Problem:    PAD (peripheral artery disease) ([facility] )  Active Problems:    Depression, recurrent ([facility] )    Benign essential hypertension    Stroke ([facility] )    Type 2 diabetes mellitus with hyperglycemia, with long-term current use of insulin ([facility] )    Acute on chronic anemia    Diabetic ulcer of heel associated with diabetes mellitus due to underlying condition (HCC)    HIT (heparin-induced thrombocytopenia)    Febrile              01/06/23 1500   OT Last Visit   OT Visit Date 01/06/23   Note Type   Note Type Treatment   Pain Assessment   Pain Assessment Tool 0-10   Pain Score No Pain   Restrictions/Precautions   Weight Bearing Precautions Per Order Yes   RLE Weight Bearing Per Order NWB   LLE Weight Bearing Per Order NWB   Braces or Orthoses   (B/L BKA)   Other Precautions Contact/isolation;Cognitive; Chair Alarm; Bed Alarm; Fall Risk   Bed Mobility   Rolling R 5  Supervision   Additional items Bedrails; Increased time required   Rolling L 4  Minimal assistance   Additional items Assist x 1;Bedrails; Increased time required   Additional Comments Improvement noted in rolling abilities  Although states R side is now easier than L side and she doesn't know why  Encouraged rolling to relieve pressure on her own  Transfers   Additional Comments Attempted supine to long sit but unable to tolerate this day post UE exercises  Functional Mobility   Additional Comments Alexandr for OOB w/ nursing  Additional items   (Pt's goal is to try SB transfers into [facility] )   ROM- Right Upper Extremities   R Shoulder Flexion;ABduction; Extension   R Elbow Elbow flexion;Elbow extension   R Wrist Wrist flexion;Wrist extension   Equipment Therabands  (Red)   R Weight/Reps/Sets 30 reps   RUE ROM Comment Tolerated well   ROM - Left Upper Extremities    L Shoulder Flexion;ABduction; Extension;Horizontal ABduction   L Elbow Elbow flexion;Elbow extension   L Wrist Wrist flexion;Wrist extension   Equipment Theraband  (Red)   L Weight/Reps/Sets 30 reps   LUE ROM Comment Tolerated well   Subjective   Subjective " They denied my rehab so I guess I have to stay here and do it "   Cognition   Arousal/Participation Alert; Responsive   Attention Attends with cues to redirect   Orientation Level Oriented to person;Oriented to place;Oriented to time   Memory Decreased short term memory;Decreased recall of recent events   Following Commands Follows one step commands without difficulty   Comments Some delay noted in processing this day  Difficulties finding objects  States her glasses don't help  Notified RN  Additional Activities   Additional Activities Comments Encouraged pt to continue to use theraband to maximize strength and allow therapy to focus on other aspects of rehab  Activity Tolerance   Activity Tolerance Patient limited by fatigue   Medical Staff Made Aware RNSam   Assessment   Assessment Pt seen for OT tx session w/ focus on rolling, UE strengthening, and activity tolerance  Improvement noted in all areas of focus this session  Pt able to roll w/ S/Min to assist staff w/ hygiene needs and relieve pressure from back  Denies pain  Tolerated UE exercises using Red theraband to improve strength for transfers and balance  Unable to pull self from supine into long sit this day s/p exercises  States her goal is to perform SB transfers into WC  Educated pt on needing to improve strength and balance  Acknowledged  Pt noted to have some delayed processing and difficulties locating objects but states her glasses don't help  Alert and oriented  Continues to make slow progress toward goals  Would benefit from rehab to maximize rehab potential and achieve optimal performance  Plan   Treatment Interventions Functional transfer training;UE strengthening/ROM; Endurance training;Patient/family training;Equipment evaluation/education; Activityengagement   Goal Expiration Date 01/17/23   OT Treatment Day 20   OT Frequency 3-5x/wk   Recommendation   OT Discharge Recommendation Post acute rehabilitation services   AM-PAC Daily Activity Inpatient   Lower Body Dressing 2   Bathing 2   Toileting 1   Upper Body Dressing 3   Grooming 3   Eating 4   Daily Activity Raw Score 15   Daily Activity Standardized Score (Calc for Raw Score >=11) 34 69   AM-PAC Applied Cognition Inpatient   Following a Speech/Presentation 4   Understanding Ordinary Conversation 4   Taking Medications 3   Remembering Where Things Are Placed or Put Away 3   Remembering List of 4-5 Errands 3   Taking Care of Complicated Tasks 2   Applied Cognition Raw Score 19   Applied Cognition Standardized Score 39 77     Ruthann Arboleda MS, OTR/L

## 2023-01-06 NOTE — ASSESSMENT & PLAN NOTE
65year old female former smoker w/HTN, HLD, uncontrolled type II DM (A1c 9 8), hx CVA, presenting with nonhealing extensive L heel ulceration and R hallux and 5th digit ulceration  She underwent multiple endovascular interventions and surgeries  She is now S/P bilateral AKA's  S/p multiple b/l endovascular interventions  JUSTIN  R hemispheric CVA    S/p L AKA 11/23/22 [facility])  S/p R AKA, wound debridement 12/1/22 [facility])  S/p R AKA wound/stump washout and V[facility]  S/p R AKA wound/stump VAC change, L AKA stump washout/packing 12/19/22 ([facility])  Wound Cx: enterobacter cloacae     Recommendations:  - Bilateral tissue loss in the setting of uncontrolled type II DM and NYDIA on admission, now s/p bilateral AKA's as above followed by wound debridements  - Endovascular interventions, c/b atheroembolic event to the RLE and JUSTIN with R hemispheric CVA  - Local wound care to bilateral AKA's daily    • R wounds cleanse with saline; apply wet to dry dressing and cover with ABD, Rachid and ABD    • R medial thigh apply moisturizer to skin    • L AKA cleanse with saline; peroxide and leave to open air   - Appreciate ID for assistance with abx- L stump cultures growing out enterobacter cloacae    Patient completed doxycycline on 1/2/23  - Continue statin, Plavix and warfarin; daily INR and dosage of warfarin  - Medical management and glucose control per SLIM  - Pain management  - Appreciate psych input and management - addition of wellbutrin and adjustment of zoloft   - Disposition planning for long-term care placement; CM is working on placement, appreciate assistance   - Will d/w Dr Catherine Davis

## 2023-01-07 LAB
GLUCOSE SERPL-MCNC: 101 MG/DL (ref 65–140)
GLUCOSE SERPL-MCNC: 110 MG/DL (ref 65–140)
GLUCOSE SERPL-MCNC: 117 MG/DL (ref 65–140)
GLUCOSE SERPL-MCNC: 152 MG/DL (ref 65–140)

## 2023-01-07 PROCEDURE — 99024 POSTOP FOLLOW-UP VISIT: CPT | Performed by: SURGERY

## 2023-01-07 PROCEDURE — 82948 REAGENT STRIP/BLOOD GLUCOSE: CPT

## 2023-01-07 RX ADMIN — BUPROPION HYDROCHLORIDE 150 MG: 150 TABLET, FILM COATED, EXTENDED RELEASE ORAL at 09:00

## 2023-01-07 RX ADMIN — QUETIAPINE FUMARATE 25 MG: 25 TABLET ORAL at 21:32

## 2023-01-07 RX ADMIN — FERROUS SULFATE TAB 325 MG (65 MG ELEMENTAL FE) 325 MG: 325 (65 FE) TAB at 12:37

## 2023-01-07 RX ADMIN — METHOCARBAMOL 1000 MG: 500 TABLET ORAL at 14:59

## 2023-01-07 RX ADMIN — OXYCODONE HYDROCHLORIDE 10 MG: 10 TABLET, FILM COATED, EXTENDED RELEASE ORAL at 21:32

## 2023-01-07 RX ADMIN — MICONAZOLE NITRATE: 20 CREAM TOPICAL at 17:27

## 2023-01-07 RX ADMIN — CLOPIDOGREL BISULFATE 75 MG: 75 TABLET ORAL at 09:01

## 2023-01-07 RX ADMIN — GABAPENTIN 400 MG: 400 CAPSULE ORAL at 09:02

## 2023-01-07 RX ADMIN — METHOCARBAMOL 1000 MG: 500 TABLET ORAL at 21:31

## 2023-01-07 RX ADMIN — GABAPENTIN 400 MG: 400 CAPSULE ORAL at 21:32

## 2023-01-07 RX ADMIN — WARFARIN SODIUM 5 MG: 5 TABLET ORAL at 17:24

## 2023-01-07 RX ADMIN — AMLODIPINE BESYLATE 10 MG: 10 TABLET ORAL at 09:01

## 2023-01-07 RX ADMIN — LISINOPRIL 40 MG: 20 TABLET ORAL at 09:01

## 2023-01-07 RX ADMIN — INSULIN GLARGINE 10 UNITS: 100 INJECTION, SOLUTION SUBCUTANEOUS at 21:33

## 2023-01-07 RX ADMIN — GABAPENTIN 400 MG: 400 CAPSULE ORAL at 17:24

## 2023-01-07 RX ADMIN — SENNOSIDES 8.6 MG: 8.6 TABLET, FILM COATED ORAL at 09:01

## 2023-01-07 RX ADMIN — PANTOPRAZOLE SODIUM 40 MG: 40 TABLET, DELAYED RELEASE ORAL at 06:29

## 2023-01-07 RX ADMIN — METOPROLOL SUCCINATE 100 MG: 100 TABLET, FILM COATED, EXTENDED RELEASE ORAL at 09:07

## 2023-01-07 RX ADMIN — DOXAZOSIN 4 MG: 4 TABLET ORAL at 21:32

## 2023-01-07 RX ADMIN — ATORVASTATIN CALCIUM 40 MG: 40 TABLET, FILM COATED ORAL at 17:22

## 2023-01-07 RX ADMIN — POTASSIUM CHLORIDE 20 MEQ: 1500 TABLET, EXTENDED RELEASE ORAL at 09:01

## 2023-01-07 RX ADMIN — OXYCODONE HYDROCHLORIDE 10 MG: 10 TABLET, FILM COATED, EXTENDED RELEASE ORAL at 09:07

## 2023-01-07 RX ADMIN — SERTRALINE HYDROCHLORIDE 175 MG: 100 TABLET ORAL at 09:00

## 2023-01-07 RX ADMIN — METHOCARBAMOL 1000 MG: 500 TABLET ORAL at 06:29

## 2023-01-07 RX ADMIN — INSULIN LISPRO 1 UNITS: 100 INJECTION, SOLUTION INTRAVENOUS; SUBCUTANEOUS at 17:24

## 2023-01-07 NOTE — ASSESSMENT & PLAN NOTE
65year old female former smoker w/HTN, HLD, uncontrolled type II DM (A1c 9 8), hx CVA, presenting with nonhealing extensive L heel ulceration and R hallux and 5th digit ulceration  She underwent multiple endovascular interventions and surgeries  She is now S/P bilateral AKA's  S/p multiple b/l endovascular interventions  JUSTIN  R hemispheric CVA    S/p L AKA 11/23/22 [facility])  S/p R AKA, wound debridement 12/1/22 [facility])  S/p R AKA wound/stump washout and VAC placement 12/16/22 [facility]  S/p R AKA wound/stump VAC change, L AKA stump washout/packing 12/19/22 ([facility])  Wound Cx: enterobacter cloacae     Recommendations:  - Bilateral tissue loss in the setting of uncontrolled type II DM and NYDIA on admission, now s/p bilateral AKA's as above followed by wound debridements  - Endovascular interventions, c/b atheroembolic event to the RLE and JUSTIN with R hemispheric CVA  - Local wound care to bilateral AKA's daily    • R wounds cleanse with saline; apply wet to dry dressing and cover with ABD, Rachid and ABD    • R medial thigh apply moisturizer to skin    • L AKA cleanse with saline; peroxide and leave to open air   - Appreciate ID for assistance with abx- L stump cultures growing out enterobacter cloacae    Patient completed doxycycline on 1/2/23  - Continue statin, Plavix and warfarin; daily INR and dosage of warfarin  - Medical management and glucose control per SLIM  - Pain management  - Appreciate psych input and management - addition of wellbutrin and adjustment of zoloft   - Disposition planning for long-term care placement; CM is working on placement, appreciate assistance

## 2023-01-07 NOTE — PROGRESS NOTES
[address] [place] [address]  Progress Note - Agata Gayle 1960, 65 y o  female MRN: [id number]  Unit/Bed#: E4 -01 Encounter: [id number]  Primary Care Provider: ALEX Ramey   Date and time admitted to hospital: 10/21/2022  7:58 PM    * PAD (peripheral artery disease) [facility])  Assessment & Plan  65year old female former smoker w/HTN, HLD, uncontrolled type II DM (A1c 9 8), hx CVA, presenting with nonhealing extensive L heel ulceration and R hallux and 5th digit ulceration  She underwent multiple endovascular interventions and surgeries  She is now S/P bilateral AKA's  S/p multiple b/l endovascular interventions  JUSTIN  R hemispheric CVA    S/p L AKA 11/23/22 [facility])  S/p R AKA, wound debridement 12/1/22 [facility])  S/p R AKA wound/stump washout and VAC placement 12/16/22 [facility])  S/p R AKA wound/stump VAC change, L AKA stump washout/packing 12/19/22 ([facility])  Wound Cx: enterobacter cloacae     Recommendations:  - Bilateral tissue loss in the setting of uncontrolled type II DM and NYDIA on admission, now s/p bilateral AKA's as above followed by wound debridements  - Endovascular interventions, c/b atheroembolic event to the RLE and JUSTIN with R hemispheric CVA  - Local wound care to bilateral AKA's daily    • R wounds cleanse with saline; apply wet to dry dressing and cover with ABD, Rachid and ABD    • R medial thigh apply moisturizer to skin    • L AKA cleanse with saline; peroxide and leave to open air   - Appreciate ID for assistance with abx- L stump cultures growing out enterobacter cloacae    Patient completed doxycycline on 1/2/23  - Continue statin, Plavix and warfarin; daily INR and dosage of warfarin  - Medical management and glucose control per SLIM  - Pain management  - Appreciate psych input and management - addition of wellbutrin and adjustment of zoloft   - Disposition planning for long-term care placement; CM is working on placement, appreciate assistance Subjective/Objective     Subjective: No acute events overnight  Resting comfortably  Objective:     Blood pressure 133/81, pulse 80, temperature 98 °F (36 7 °C), temperature source Temporal, resp  rate 20, height 5' 4" (1 626 m), weight 65 7 kg (144 lb 13 5 oz), SpO2 94 %, not currently breastfeeding  ,Body mass index is 24 86 kg/m²  No intake or output data in the 24 hours ending 01/07/23 0823    Invasive Devices     Peripheral Intravenous Line  Duration           Peripheral IV 01/02/23 Dorsal (posterior); Right Forearm 5 days    Peripheral IV 01/06/23 Dorsal (posterior); Left;Proximal Forearm 1 day                Physical Exam:  Gen:  NAD  HENT: MMM  CV:  RRR  Lungs:  nl effort  Abd:  soft, NT/ND  Ext:  B/L AKA  Skin:  no rashes  Neuro:  A&Ox3     Lab, Imaging and other studies:I have personally reviewed pertinent lab results      VTE Pharmacologic Prophylaxis: Warfarin (Coumadin)  VTE Mechanical Prophylaxis: reason for no mechanical VTE prophylaxis B/L AKA

## 2023-01-07 NOTE — PLAN OF CARE
Problem: Potential for Falls  Goal: Patient will remain free of falls  Description: INTERVENTIONS:  - Educate patient/family on patient safety including physical limitations  - Instruct patient to call for assistance with activity   - Consult OT/PT to assist with strengthening/mobility   - Keep Call bell within reach  - Keep bed low and locked with side rails adjusted as appropriate  - Keep care items and personal belongings within reach  - Initiate and maintain comfort rounds  - Make Fall Risk Sign visible to staff  - Offer Toileting every 2 Hours, in advance of need  - Initiate/Maintain bed  chair alarm  - Obtain necessary fall risk management equipment: bed chair alarm, call bell, gripper sock, walker, bedside commode  - Apply yellow socks and bracelet for high fall risk patients  - Consider moving patient to room near nurses station  Outcome: Progressing     Problem: MOBILITY - ADULT  Goal: Maintain or return to baseline ADL function  Description: INTERVENTIONS:  -  Assess patient's ability to carry out ADLs; assess patient's baseline for ADL function and identify physical deficits which impact ability to perform ADLs (bathing, care of mouth/teeth, toileting, grooming, dressing, etc )  - Assess/evaluate cause of self-care deficits   - Assess range of motion  - Assess patient's mobility; develop plan if impaired  - Assess patient's need for assistive devices and provide as appropriate  - Encourage maximum independence but intervene and supervise when necessary  - Involve family in performance of ADLs  - Assess for home care needs following discharge   - Consider OT consult to assist with ADL evaluation and planning for discharge  - Provide patient education as appropriate  Outcome: Progressing  Goal: Maintains/Returns to pre admission functional level  Description: INTERVENTIONS:  - Perform BMAT or MOVE assessment daily    - Set and communicate daily mobility goal to care team and patient/family/caregiver     - Collaborate with rehabilitation services on mobility goals if consulted  - Assist patient in repositioning every 2 hours    - Dangle patient 3 times a day  - Stand patient 3 times a day  - Out of bed to chair as tolerated  - Out of bed for meals  - Out of bed for toileting  - Record patient progress and toleration of activity level   Outcome: Progressing     Problem: PAIN - ADULT  Goal: Verbalizes/displays adequate comfort level or baseline comfort level  Description: Interventions:  - Encourage patient to monitor pain and request assistance  - Assess pain using appropriate pain scale  - Administer analgesics based on type and severity of pain and evaluate response  - Implement non-pharmacological measures as appropriate and evaluate response  - Consider cultural and social influences on pain and pain management  - Notify physician/advanced practitioner if interventions unsuccessful or patient reports new pain  Outcome: Progressing     Problem: INFECTION - ADULT  Goal: Absence or prevention of progression during hospitalization  Description: INTERVENTIONS:  - Assess and monitor for signs and symptoms of infection  - Monitor lab/diagnostic results  - Monitor all insertion sites, i e  indwelling lines, tubes, and drains  - Administer medications as ordered  - Instruct and encourage patient and family to use good hand hygiene technique  Outcome: Progressing     Problem: SAFETY ADULT  Goal: Patient will remain free of falls  Description: INTERVENTIONS:  - Educate patient/family on patient safety including physical limitations  - Instruct patient to call for assistance with activity   - Consult OT/PT to assist with strengthening/mobility   - Keep Call bell within reach  - Keep bed low and locked with side rails adjusted as appropriate  - Keep care items and personal belongings within reach  - Initiate and maintain comfort rounds  - Make Fall Risk Sign visible to staff  - Offer Toileting every 2 Hours, in advance of need  - Initiate/Maintain bed  chair alarm  - Obtain necessary fall risk management equipment: bed chair alarm, call bell, gripper sock, walker, bedside commode  - Apply yellow socks and bracelet for high fall risk patients  - Consider moving patient to room near nurses station  Outcome: Progressing  Goal: Maintain or return to baseline ADL function  Description: INTERVENTIONS:  -  Assess patient's ability to carry out ADLs; assess patient's baseline for ADL function and identify physical deficits which impact ability to perform ADLs (bathing, care of mouth/teeth, toileting, grooming, dressing, etc )  - Assess/evaluate cause of self-care deficits   - Assess range of motion  - Assess patient's mobility; develop plan if impaired  - Assess patient's need for assistive devices and provide as appropriate  - Encourage maximum independence but intervene and supervise when necessary  - Involve family in performance of ADLs  - Assess for home care needs following discharge   - Consider OT consult to assist with ADL evaluation and planning for discharge  - Provide patient education as appropriate  Outcome: Progressing  Goal: Maintains/Returns to pre admission functional level  Description: INTERVENTIONS:  - Perform BMAT or MOVE assessment daily    - Set and communicate daily mobility goal to care team and patient/family/caregiver  - Collaborate with rehabilitation services on mobility goals if consulted  - Assist patient in repositioning every 2 hours    - Dangle patient 3 times a day  - Stand patient 3 times a day  - Out of bed to chair as tolerated  - Out of bed for meals  - Out of bed for toileting  - Record patient progress and toleration of activity level   Outcome: Progressing     Problem: DISCHARGE PLANNING  Goal: Discharge to home or other facility with appropriate resources  Description: INTERVENTIONS:  - Identify barriers to discharge w/patient   - Arrange for needed discharge resources and transportation as appropriate  - Identify discharge learning needs  - Refer to Case Management Department for coordinating discharge planning if the patient needs post-hospital services based on physician/advanced practitioner order   Outcome: Progressing     Problem: Knowledge Deficit  Goal: Patient/family/caregiver demonstrates understanding of disease process, treatment plan, medications, and discharge instructions  Description: Complete learning assessment and assess knowledge base    Interventions:  - Provide teaching at level of understanding  - Provide teaching via preferred learning methods  Outcome: Progressing     Problem: METABOLIC, FLUID AND ELECTROLYTES - ADULT  Goal: Electrolytes maintained within normal limits  Description: INTERVENTIONS:  - Monitor labs and assess patient for signs and symptoms of electrolyte imbalances  - Administer electrolyte replacement as ordered  - Monitor response to electrolyte replacements, including repeat lab results as appropriate  - Instruct patient on fluid and nutrition as appropriate  Outcome: Progressing  Goal: Fluid balance maintained  Description: INTERVENTIONS:  - Monitor labs   - Monitor I/O and WT  - Instruct patient on fluid and nutrition as appropriate  - Assess for signs & symptoms of volume excess or deficit  Outcome: Progressing  Goal: Glucose maintained within target range  Description: INTERVENTIONS:  - Monitor Blood Glucose as ordered  - Assess for signs and symptoms of hyperglycemia and hypoglycemia  - Administer ordered medications to maintain glucose within target range  - Assess nutritional intake and initiate nutrition service referral as needed  Outcome: Progressing     Problem: SKIN/TISSUE INTEGRITY - ADULT  Goal: Skin Integrity remains intact(Skin Breakdown Prevention)  Description: Assess:  -Perform Nicanor assessment every shift  -Clean and moisturize skin every shift  -Inspect skin when repositioning, toileting, and assisting with ADLS  -Assess under medical devices   -Assess extremities for adequate circulation and sensation     Bed Management:  -Have minimal linens on bed & keep smooth, unwrinkled  -Change linens as needed when moist or perspiring  -Avoid sitting or lying in one position for more than 2 hours while in bed    Toileting:  -Offer bedside commode  -Assess for incontinence every 2 hours  -Use incontinent care products after each incontinent episode     Activity:  -Mobilize patient 3 times a day  -Encourage or provide ROM exercises   -Turn and reposition patient every 2 Hours  -Use appropriate equipment to lift or move patient in bed  -Instruct/ Assist with weight shifting every 15 minutes when out of bed in chair  -Consider limitation of chair time 1 hour intervals    Skin Care:  -Avoid use of baby powder, tape, friction and shearing, hot water or constrictive clothing  -Relieve pressure over bony prominences using waffle cushion when in chair  -Do not massage red bony areas    Outcome: Progressing  Goal: Incision(s), wounds(s) or drain site(s) healing without S/S of infection  Description: INTERVENTIONS  - Assess and document dressing, incision, wound bed, drain sites and surrounding tissue  - Provide patient and family education  - Perform skin care/dressing changes everyday as ordered  Outcome: Progressing  Goal: Pressure injury heals and does not worsen  Description: Interventions:  - Implement low air loss mattress or specialty surface (Criteria met)  - Apply silicone foam dressing  - Instruct/assist with weight shifting every 15 minutes when in chair   - Limit chair time to 1 hour intervals  - Use special pressure reducing interventions such as waffle cushion when in chair   - Perform passive or active ROM   - Turn and reposition patient & offload bony prominences every 2 hours   - Utilize friction reducing device or surface for transfers   - Consider consults to  interdisciplinary teams such as wound care, PT/OT  - Use incontinent care products after each incontinent episode   - Consider nutrition services referral as needed  Outcome: Progressing     Problem: MUSCULOSKELETAL - ADULT  Goal: Maintain or return mobility to safest level of function  Description: INTERVENTIONS:  - Assess patient's ability to carry out ADLs; assess patient's baseline for ADL function and identify physical deficits which impact ability to perform ADLs (bathing, care of mouth/teeth, toileting, grooming, dressing, etc )  - Assess/evaluate cause of self-care deficits   - Assess range of motion  - Assess patient's mobility  - Assess patient's need for assistive devices and provide as appropriate  - Encourage maximum independence but intervene and supervise when necessary  - Involve family in performance of ADLs  - Assess for home care needs following discharge   - Consider OT consult to assist with ADL evaluation and planning for discharge  - Provide patient education as appropriate  Outcome: Progressing  Goal: Maintain proper alignment of affected body part  Description: INTERVENTIONS:  - Support, maintain and protect limb and body alignment  - Provide patient/ family with appropriate education  Outcome: Progressing     Problem: CARDIOVASCULAR - ADULT  Goal: Maintains optimal cardiac output and hemodynamic stability  Description: INTERVENTIONS:  - Monitor I/O, vital signs and rhythm  - Monitor for S/S and trends of decreased cardiac output  - Administer and titrate ordered vasoactive medications to optimize hemodynamic stability  - Assess quality of pulses, skin color and temperature  - Assess for signs of decreased coronary artery perfusion  - Instruct patient to report change in severity of symptoms  Outcome: Progressing     Problem: RESPIRATORY - ADULT  Goal: Achieves optimal ventilation and oxygenation  Description: INTERVENTIONS:  - Assess for changes in respiratory status  - Assess for changes in mentation and behavior  - Position to facilitate oxygenation and minimize respiratory effort  - Oxygen administered by appropriate delivery if ordered  - Initiate smoking cessation education as indicated  - Encourage broncho-pulmonary hygiene including cough, deep breathe, Incentive Spirometry  - Assess the need for suctioning and aspirate as needed  - Assess and instruct to report SOB or any respiratory difficulty  - Respiratory Therapy support as indicated  Outcome: Progressing     Problem: Prexisting or High Potential for Compromised Skin Integrity  Goal: Skin integrity is maintained or improved  Description: INTERVENTIONS:  - Identify patients at risk for skin breakdown  - Assess and monitor skin integrity  - Assess and monitor nutrition and hydration status  - Monitor labs   - Assess for incontinence   - Turn and reposition patient  - Assist with mobility/ambulation  - Relieve pressure over bony prominences  - Avoid friction and shearing  - Provide appropriate hygiene as needed including keeping skin clean and dry  - Evaluate need for skin moisturizer/barrier cream  - Collaborate with interdisciplinary team   - Patient/family teaching  - Consider wound care consult   Outcome: Progressing     Problem: Nutrition/Hydration-ADULT  Goal: Nutrient/Hydration intake appropriate for improving, restoring or maintaining nutritional needs  Description: Monitor and assess patient's nutrition/hydration status for malnutrition  Collaborate with interdisciplinary team and initiate plan and interventions as ordered  Monitor patient's weight and dietary intake as ordered or per policy  Utilize nutrition screening tool and intervene as necessary  Determine patient's food preferences and provide high-protein, high-caloric foods as appropriate       INTERVENTIONS:  - Monitor oral intake, urinary output, labs, and treatment plans  - Assess nutrition and hydration status and recommend course of action  - Evaluate amount of meals eaten  - Assist patient with eating if necessary   - Allow adequate time for meals  - Recommend/ encourage appropriate diets, oral nutritional supplements, and vitamin/mineral supplements  - Order, calculate, and assess calorie counts as needed  - Assess need for intravenous fluids  - Provide nutrition/hydration education as appropriate  - Include patient/family/caregiver in decisions related to nutrition  Outcome: Progressing     Problem: GENITOURINARY - ADULT  Goal: Maintains or returns to baseline urinary function  Description: INTERVENTIONS:  - Assess urinary function  - Encourage oral fluids to ensure adequate hydration if ordered  - Administer IV fluids as ordered to ensure adequate hydration  - Administer ordered medications as needed  - Offer frequent toileting  - Follow urinary retention protocol if ordered  Outcome: Progressing  Goal: Absence of urinary retention  Description: INTERVENTIONS:  - Assess patient’s ability to void and empty bladder  - Monitor I/O  - Bladder scan as needed  - Discuss with physician/AP medications to alleviate retention as needed  - Discuss catheterization for long term situations as appropriate  Outcome: Progressing     Problem: HEMATOLOGIC - ADULT  Goal: Maintains hematologic stabil  Description: INTERVENTIONS  - Assess for signs and symptoms of bleeding or hemorrhage  - Monitor labs  - Administer supportive blood products/factors as ordered and appropriate  Outcome: Progressing

## 2023-01-07 NOTE — CASE MANAGEMENT
Case Management Progress Note    Patient name Christobal Plan  Location [facility] /E4 [address] [facility]-* MRN [id number]  : 1960 Date 2023       LOS (days): 77  Geometric Mean LOS (GMLOS) (days):   Days to GMLOS:        OBJECTIVE:        Current admission status: Inpatient  Preferred Pharmacy:   [id number] [address] [place] [address]  [address]  [facility] [address]  Phone: [phone] Fax: [phone]    Primary Care Provider: ALEX Ortega    Primary Insurance: BLUE CROSS  Secondary Insurance: [place] [employer]    PROGRESS NOTE:    Per insurance verifiers, patient's CBC insurance coverage has termed  Patient active with [place] MA since 2022  Patient has no other insurance coverage other than straight [place] at this time  Per call from daughter, Yasmani Marroquin, requesting rehab search expanded to [facility] referral search initiated via [facility] for 100-mile radius to determine available options  CM to follow at this time

## 2023-01-07 NOTE — NUTRITION
Suggest calorie counts x 48 hours       Thank you,  Grezgorz Wing, DTR  Dietetic Technician, registered  Department of Clinical Nutrition

## 2023-01-08 LAB
GLUCOSE SERPL-MCNC: 100 MG/DL (ref 65–140)
GLUCOSE SERPL-MCNC: 121 MG/DL (ref 65–140)
GLUCOSE SERPL-MCNC: 156 MG/DL (ref 65–140)
GLUCOSE SERPL-MCNC: 166 MG/DL (ref 65–140)
INR PPP: 2.06 (ref 0.84–1.19)
PROTHROMBIN TIME: 23.2 SECONDS (ref 11.6–14.5)

## 2023-01-08 PROCEDURE — 85610 PROTHROMBIN TIME: CPT | Performed by: SURGERY

## 2023-01-08 PROCEDURE — 99024 POSTOP FOLLOW-UP VISIT: CPT | Performed by: SURGERY

## 2023-01-08 PROCEDURE — 82948 REAGENT STRIP/BLOOD GLUCOSE: CPT

## 2023-01-08 RX ADMIN — MICONAZOLE NITRATE: 20 CREAM TOPICAL at 18:24

## 2023-01-08 RX ADMIN — BUPROPION HYDROCHLORIDE 150 MG: 150 TABLET, FILM COATED, EXTENDED RELEASE ORAL at 08:32

## 2023-01-08 RX ADMIN — DOXAZOSIN 4 MG: 4 TABLET ORAL at 22:59

## 2023-01-08 RX ADMIN — SERTRALINE HYDROCHLORIDE 175 MG: 100 TABLET ORAL at 08:30

## 2023-01-08 RX ADMIN — INSULIN LISPRO 1 UNITS: 100 INJECTION, SOLUTION INTRAVENOUS; SUBCUTANEOUS at 12:21

## 2023-01-08 RX ADMIN — ATORVASTATIN CALCIUM 40 MG: 40 TABLET, FILM COATED ORAL at 18:21

## 2023-01-08 RX ADMIN — GABAPENTIN 400 MG: 400 CAPSULE ORAL at 08:31

## 2023-01-08 RX ADMIN — FERROUS SULFATE TAB 325 MG (65 MG ELEMENTAL FE) 325 MG: 325 (65 FE) TAB at 12:20

## 2023-01-08 RX ADMIN — METOPROLOL SUCCINATE 100 MG: 100 TABLET, FILM COATED, EXTENDED RELEASE ORAL at 08:30

## 2023-01-08 RX ADMIN — GABAPENTIN 400 MG: 400 CAPSULE ORAL at 18:21

## 2023-01-08 RX ADMIN — METHOCARBAMOL 1000 MG: 500 TABLET ORAL at 22:59

## 2023-01-08 RX ADMIN — ACETAMINOPHEN 650 MG: 325 TABLET, FILM COATED ORAL at 06:36

## 2023-01-08 RX ADMIN — QUETIAPINE FUMARATE 25 MG: 25 TABLET ORAL at 22:59

## 2023-01-08 RX ADMIN — WARFARIN SODIUM 5 MG: 5 TABLET ORAL at 18:22

## 2023-01-08 RX ADMIN — CLOPIDOGREL BISULFATE 75 MG: 75 TABLET ORAL at 08:31

## 2023-01-08 RX ADMIN — INSULIN LISPRO 1 UNITS: 100 INJECTION, SOLUTION INTRAVENOUS; SUBCUTANEOUS at 18:21

## 2023-01-08 RX ADMIN — OXYCODONE HYDROCHLORIDE 10 MG: 10 TABLET, FILM COATED, EXTENDED RELEASE ORAL at 20:17

## 2023-01-08 RX ADMIN — OXYCODONE 5 MG: 5 TABLET ORAL at 14:23

## 2023-01-08 RX ADMIN — MICONAZOLE NITRATE: 20 CREAM TOPICAL at 08:37

## 2023-01-08 RX ADMIN — METHOCARBAMOL 1000 MG: 500 TABLET ORAL at 06:35

## 2023-01-08 RX ADMIN — INSULIN GLARGINE 10 UNITS: 100 INJECTION, SOLUTION SUBCUTANEOUS at 22:59

## 2023-01-08 RX ADMIN — POTASSIUM CHLORIDE 20 MEQ: 1500 TABLET, EXTENDED RELEASE ORAL at 08:31

## 2023-01-08 RX ADMIN — LISINOPRIL 40 MG: 20 TABLET ORAL at 08:32

## 2023-01-08 RX ADMIN — PANTOPRAZOLE SODIUM 40 MG: 40 TABLET, DELAYED RELEASE ORAL at 06:35

## 2023-01-08 RX ADMIN — AMLODIPINE BESYLATE 10 MG: 10 TABLET ORAL at 08:31

## 2023-01-08 RX ADMIN — OXYCODONE HYDROCHLORIDE 10 MG: 10 TABLET, FILM COATED, EXTENDED RELEASE ORAL at 08:31

## 2023-01-08 RX ADMIN — METHOCARBAMOL 1000 MG: 500 TABLET ORAL at 13:59

## 2023-01-08 RX ADMIN — GABAPENTIN 400 MG: 400 CAPSULE ORAL at 20:18

## 2023-01-08 NOTE — ASSESSMENT & PLAN NOTE
65year old female former smoker w/HTN, HLD, uncontrolled type II DM (A1c 9 8), hx CVA, presenting with nonhealing extensive L heel ulceration and R hallux and 5th digit ulceration  She underwent multiple endovascular interventions and surgeries  She is now S/P bilateral AKA's  S/p multiple b/l endovascular interventions  JUSTIN  R hemispheric CVA    S/p L AKA 11/23/22 [facility])  S/p R AKA, wound debridement 12/1/22 [facility])  S/p R AKA wound/stump washout and VAC placement 12/16/22 [facility])  S/p R AKA wound/stump VAC change, L AKA stump washout/packing 12/19/22 ([facility])  Wound Cx: enterobacter cloacae     Recommendations:  - Bilateral tissue loss in the setting of uncontrolled type II DM and NYDIA on admission, now s/p bilateral AKA's as above followed by wound debridements  - Endovascular interventions, c/b atheroembolic event to the RLE and JUSTIN with R hemispheric CVA  - Local wound care to bilateral AKA's daily    • R wounds cleanse with saline; apply wet to dry dressing and cover with ABD, Rachid and ABD    • R medial thigh apply moisturizer to skin    • L AKA cleanse with saline; peroxide and leave to open air   - Appreciate ID for assistance with abx- L stump cultures growing out enterobacter cloacae    Patient completed doxycycline on 1/2/23  - Continue statin, Plavix and warfarin  - Medical management and glucose control per SLIM  - Pain management  - Appreciate psych input and management - addition of wellbutrin and adjustment of zoloft   - INR 2 29, continue regimen of coumadin   - Disposition planning for long-term care placement; CM is working on placement, appreciate assistance

## 2023-01-08 NOTE — PROGRESS NOTES
[address] [place] [address]  Progress Note - Christobal Plan 1960, 65 y o  female MRN: [id number]  Unit/Bed#: E4 -01 Encounter: [id number]  Primary Care Provider: ALEX Ortega   Date and time admitted to hospital: 10/21/2022  7:58 PM    * PAD (peripheral artery disease) [facility])  Assessment & Plan  65year old female former smoker w/HTN, HLD, uncontrolled type II DM (A1c 9 8), hx CVA, presenting with nonhealing extensive L heel ulceration and R hallux and 5th digit ulceration  She underwent multiple endovascular interventions and surgeries  She is now S/P bilateral AKA's  S/p multiple b/l endovascular interventions  JUSTIN  R hemispheric CVA    S/p L AKA 11/23/22 [facility])  S/p R AKA, wound debridement 12/1/22 [facility]  S/p R AKA wound/stump washout and VAC placement 12/16/22 [facility])  S/p R AKA wound/stump VAC change, L AKA stump washout/packing 12/19/22 ([facility])  Wound Cx: enterobacter cloacae     Recommendations:  - Bilateral tissue loss in the setting of uncontrolled type II DM and NYDIA on admission, now s/p bilateral AKA's as above followed by wound debridements  - Endovascular interventions, c/b atheroembolic event to the RLE and JUSTIN with R hemispheric CVA  - Local wound care to bilateral AKA's daily    • R wounds cleanse with saline; apply wet to dry dressing and cover with ABD, Rachid and ABD    • R medial thigh apply moisturizer to skin    • L AKA cleanse with saline; peroxide and leave to open air   - Appreciate ID for assistance with abx- L stump cultures growing out enterobacter cloacae    Patient completed doxycycline on 1/2/23  - Continue statin, Plavix and warfarin; daily INR and dosage of warfarin  - Medical management and glucose control per SLIM  - Pain management  - Appreciate psych input and management - addition of wellbutrin and adjustment of zoloft   -F/u INR   - Disposition planning for long-term care placement; CM is working on placement, appreciate assistance               Subjective/Objective     Subjective: No acute events overnight  Resting comfortably  Objective:     Blood pressure 152/71, pulse 81, temperature 98 4 °F (36 9 °C), temperature source Temporal, resp  rate 20, height 5' 4" (1 626 m), weight 65 7 kg (144 lb 13 5 oz), SpO2 96 %, not currently breastfeeding  ,Body mass index is 24 86 kg/m²  No intake or output data in the 24 hours ending 01/07/23 1915    Invasive Devices     Peripheral Intravenous Line  Duration           Peripheral IV 01/06/23 Dorsal (posterior); Right Forearm 1 day                  Physical Exam  Vitals reviewed  Constitutional:       General: She is sleeping  HENT:      Head: Normocephalic and atraumatic  Cardiovascular:      Rate and Rhythm: Normal rate  Pulmonary:      Effort: Pulmonary effort is normal    Musculoskeletal:      Comments: R AKA with dressing in place      Right Lower Extremity: Right leg is amputated above knee  Left Lower Extremity: Left leg is amputated above knee  Lab, Imaging and other studies:I have personally reviewed pertinent lab results      VTE Pharmacologic Prophylaxis: Warfarin (Coumadin)  VTE Mechanical Prophylaxis: reason for no mechanical VTE prophylaxis B/L AKA

## 2023-01-09 LAB
GLUCOSE SERPL-MCNC: 104 MG/DL (ref 65–140)
GLUCOSE SERPL-MCNC: 159 MG/DL (ref 65–140)
GLUCOSE SERPL-MCNC: 179 MG/DL (ref 65–140)
GLUCOSE SERPL-MCNC: 98 MG/DL (ref 65–140)
INR PPP: 2.29 (ref 0.84–1.19)
PROTHROMBIN TIME: 25.1 SECONDS (ref 11.6–14.5)

## 2023-01-09 PROCEDURE — 99024 POSTOP FOLLOW-UP VISIT: CPT | Performed by: PHYSICIAN ASSISTANT

## 2023-01-09 PROCEDURE — 85610 PROTHROMBIN TIME: CPT | Performed by: PHYSICIAN ASSISTANT

## 2023-01-09 PROCEDURE — 82948 REAGENT STRIP/BLOOD GLUCOSE: CPT

## 2023-01-09 RX ADMIN — SERTRALINE HYDROCHLORIDE 175 MG: 100 TABLET ORAL at 10:18

## 2023-01-09 RX ADMIN — METOPROLOL SUCCINATE 100 MG: 100 TABLET, FILM COATED, EXTENDED RELEASE ORAL at 10:19

## 2023-01-09 RX ADMIN — OXYCODONE HYDROCHLORIDE 10 MG: 10 TABLET, FILM COATED, EXTENDED RELEASE ORAL at 21:27

## 2023-01-09 RX ADMIN — COLLAGENASE SANTYL: 250 OINTMENT TOPICAL at 14:51

## 2023-01-09 RX ADMIN — DOXAZOSIN 4 MG: 4 TABLET ORAL at 21:27

## 2023-01-09 RX ADMIN — GABAPENTIN 400 MG: 400 CAPSULE ORAL at 10:19

## 2023-01-09 RX ADMIN — MICONAZOLE NITRATE: 20 CREAM TOPICAL at 10:20

## 2023-01-09 RX ADMIN — MICONAZOLE NITRATE: 20 CREAM TOPICAL at 18:14

## 2023-01-09 RX ADMIN — AMLODIPINE BESYLATE 10 MG: 10 TABLET ORAL at 10:18

## 2023-01-09 RX ADMIN — METHOCARBAMOL 1000 MG: 500 TABLET ORAL at 07:00

## 2023-01-09 RX ADMIN — BUPROPION HYDROCHLORIDE 150 MG: 150 TABLET, FILM COATED, EXTENDED RELEASE ORAL at 10:19

## 2023-01-09 RX ADMIN — METHOCARBAMOL 1000 MG: 500 TABLET ORAL at 21:27

## 2023-01-09 RX ADMIN — INSULIN LISPRO 1 UNITS: 100 INJECTION, SOLUTION INTRAVENOUS; SUBCUTANEOUS at 13:17

## 2023-01-09 RX ADMIN — QUETIAPINE FUMARATE 25 MG: 25 TABLET ORAL at 21:27

## 2023-01-09 RX ADMIN — METHOCARBAMOL 1000 MG: 500 TABLET ORAL at 13:17

## 2023-01-09 RX ADMIN — INSULIN GLARGINE 10 UNITS: 100 INJECTION, SOLUTION SUBCUTANEOUS at 21:28

## 2023-01-09 RX ADMIN — CLOPIDOGREL BISULFATE 75 MG: 75 TABLET ORAL at 10:19

## 2023-01-09 RX ADMIN — GABAPENTIN 400 MG: 400 CAPSULE ORAL at 18:12

## 2023-01-09 RX ADMIN — PANTOPRAZOLE SODIUM 40 MG: 40 TABLET, DELAYED RELEASE ORAL at 07:00

## 2023-01-09 RX ADMIN — GABAPENTIN 400 MG: 400 CAPSULE ORAL at 21:27

## 2023-01-09 RX ADMIN — OXYCODONE HYDROCHLORIDE 10 MG: 10 TABLET, FILM COATED, EXTENDED RELEASE ORAL at 10:18

## 2023-01-09 RX ADMIN — WARFARIN SODIUM 5 MG: 5 TABLET ORAL at 18:12

## 2023-01-09 RX ADMIN — POTASSIUM CHLORIDE 20 MEQ: 1500 TABLET, EXTENDED RELEASE ORAL at 10:19

## 2023-01-09 RX ADMIN — FERROUS SULFATE TAB 325 MG (65 MG ELEMENTAL FE) 325 MG: 325 (65 FE) TAB at 13:17

## 2023-01-09 RX ADMIN — LISINOPRIL 40 MG: 20 TABLET ORAL at 10:19

## 2023-01-09 RX ADMIN — ATORVASTATIN CALCIUM 40 MG: 40 TABLET, FILM COATED ORAL at 18:12

## 2023-01-09 NOTE — PROGRESS NOTES
[address] [facility]  [facility] Note [facility] 1960, 65 y o  female MRN: [id number]  Unit/Bed#: E4 -01 Encounter: [id number]  Primary Care Provider: ALEX Medrano   Date and time admitted to hospital: 10/21/2022  7:58 PM    * PAD (peripheral artery disease) [facility])  Assessment & Plan  65year old female former smoker w/HTN, HLD, uncontrolled type II DM (A1c 9 8), hx CVA, presenting with nonhealing extensive L heel ulceration and R hallux and 5th digit ulceration  She underwent multiple endovascular interventions and surgeries  She is now S/P bilateral AKA's  S/p multiple b/l endovascular interventions  JUSTIN  R hemispheric CVA    S/p L AKA 11/23/22 [facility])  S/p R AKA, wound debridement 12/1/22 [facility])  S/p R AKA wound/stump washout and VAC placement 12/16/22 [facility])  S/p R AKA wound/stump VAC change, L AKA stump washout/packing 12/19/22 ([facility])  Wound Cx: enterobacter cloacae     Recommendations:  - Bilateral tissue loss in the setting of uncontrolled type II DM and NYDIA on admission, now s/p bilateral AKA's as above followed by wound debridements  - Endovascular interventions, c/b atheroembolic event to the RLE and JUSTIN with R hemispheric CVA  - Local wound care to bilateral AKA's daily    • R wounds cleanse with saline; apply wet to dry dressing and cover with ABD, Rachid and ABD    • R medial thigh apply moisturizer to skin    • L AKA cleanse with saline; peroxide and leave to open air   - Appreciate ID for assistance with abx- L stump cultures growing out enterobacter cloacae    Patient completed doxycycline on 1/2/23  - Continue statin, Plavix and warfarin  - Medical management and glucose control per SLIM  - Pain management  - Appreciate psych input and management - addition of wellbutrin and adjustment of zoloft   - INR 2 29, continue regimen of coumadin   - Disposition planning for long-term care placement; CM is working on placement, appreciate assistance Subjective: Patient seen and examined  Offers no complaints  Denies pain  INR remains therapeutic  Awaiting placement    Vitals:  /57 (BP Location: Left arm)   Pulse 71   Temp 98 4 °F (36 9 °C) (Temporal)   Resp 16   Ht 5' 4" (1 626 m)   Wt 65 6 kg (144 lb 10 oz)   SpO2 96%   BMI 24 82 kg/m²     I/Os:  No intake/output data recorded  I/O this shift: In: 480 [P O :480]  Out: -     Lab Results and Cultures:   CBC with diff: Lab Results   Component Value Date    WBC 10 68 (H) 01/05/2023    HGB 9 4 (L) 01/05/2023    HCT 32 5 (L) 01/05/2023    MCV 86 01/05/2023     (H) 01/05/2023    MCH 24 9 (L) 01/05/2023    MCHC 28 9 (L) 01/05/2023    RDW 16 9 (H) 01/05/2023    MPV 8 5 (L) 01/05/2023    NRBC 0 12/27/2022   ,   BMP/CMP:  Lab Results   Component Value Date    SODIUM 141 12/24/2022    K 3 6 12/24/2022     12/24/2022    CO2 29 12/24/2022    BUN 8 12/24/2022    CREATININE 0 65 12/24/2022    CALCIUM 8 1 (L) 12/24/2022    AST 27 12/22/2022    ALT 13 12/22/2022    ALKPHOS 167 (H) 12/22/2022    EGFR 96 12/24/2022   ,   Lipid Panel: No results found for: CHOL,   Coags:   Lab Results   Component Value Date    PTT 95 (H) 12/12/2022    INR 2 29 (H) 01/09/2023   ,     Blood Culture:   Lab Results   Component Value Date    BLOODCX No Growth After 5 Days   11/13/2022    BLOODCX Staphylococcus coagulase negative (A) 11/13/2022   ,   Urinalysis: Lab Results   Component Value Date    COLORU Yellow 11/12/2022    CLARITYU Slightly Cloudy 11/12/2022    SPECGRAV 1 025 11/12/2022    PHUR 5 5 11/12/2022    PHUR 6 5 02/23/2018    LEUKOCYTESUR Trace (A) 11/12/2022    NITRITE Positive (A) 11/12/2022    GLUCOSEU Negative 11/12/2022    KETONESU Negative 11/12/2022    BILIRUBINUR Negative 11/12/2022    BLOODU Large (A) 11/12/2022   ,   Urine Culture:   Lab Results   Component Value Date    URINECX >100,000 cfu/ml Enterobacter cloacae complex (A) 11/12/2022    URINECX 70,000-79,000 cfu/ml Kluyveromyces marxianus (A) 11/12/2022   ,   Wound Culure: No results found for: WOUNDCULT    Medications:  Current Facility-Administered Medications   Medication Dose Route Frequency   • acetaminophen (TYLENOL) tablet 650 mg  650 mg Oral Q6H PRN   • ALPRAZolam (XANAX) tablet 0 25 mg  0 25 mg Oral Daily PRN   • aluminum-magnesium hydroxide-simethicone (MYLANTA) oral suspension 30 mL  30 mL Oral Q4H PRN   • amLODIPine (NORVASC) tablet 10 mg  10 mg Oral Daily   • atorvastatin (LIPITOR) tablet 40 mg  40 mg Oral Daily With Dinner   • buPROPion (WELLBUTRIN XL) 24 hr tablet 150 mg  150 mg Oral Daily   • clopidogrel (PLAVIX) tablet 75 mg  75 mg Oral Daily   • collagenase (SANTYL) ointment   Topical Daily   • doxazosin (CARDURA) tablet 4 mg  4 mg Oral HS   • ferrous sulfate tablet 325 mg  325 mg Oral Daily With Lunch   • gabapentin (NEURONTIN) capsule 400 mg  400 mg Oral TID   • insulin glargine (LANTUS) subcutaneous injection 10 Units 0 1 mL  10 Units Subcutaneous HS   • insulin lispro (HumaLOG) 100 units/mL subcutaneous injection 1-6 Units  1-6 Units Subcutaneous TID AC   • labetalol (NORMODYNE) injection 10 mg  10 mg Intravenous Q6H PRN   • lisinopril (ZESTRIL) tablet 40 mg  40 mg Oral Daily   • methocarbamol (ROBAXIN) tablet 1,000 mg  1,000 mg Oral Q8H [address]   • metoclopramide (REGLAN) injection 10 mg  10 mg Intravenous Q6H PRN   • metoprolol succinate (TOPROL-XL) 24 hr tablet 100 mg  100 mg Oral Daily   • moisture barrier miconazole 2% cream (aka GINA MOISTURE BARRIER ANTIFUNGAL CREAM)   Topical BID   • naloxone (NARCAN) 0 04 mg/mL syringe 0 04 mg  0 04 mg Intravenous Q1MIN PRN   • ondansetron (ZOFRAN) injection 4 mg  4 mg Intravenous Q6H PRN   • oxyCODONE (OxyCONTIN) 12 hr tablet 10 mg  10 mg Oral Q12H MICHELLE   • oxyCODONE (ROXICODONE) IR tablet 2 5 mg  2 5 mg Oral Q4H PRN    Or   • oxyCODONE (ROXICODONE) IR tablet 5 mg  5 mg Oral Q4H PRN    Or   • oxyCODONE (ROXICODONE) IR tablet 7 5 mg  7 5 mg Oral Q4H PRN   • pantoprazole (PROTONIX) EC tablet 40 mg  40 mg Oral Early Morning   • polyethylene glycol (MIRALAX) packet 17 g  17 g Oral BID   • potassium chloride (K-DUR,KLOR-CON) CR tablet 20 mEq  20 mEq Oral Daily   • QUEtiapine (SEROquel) tablet 25 mg  25 mg Oral HS   • senna (SENOKOT) tablet 8 6 mg  1 tablet Oral BID   • sertraline (ZOLOFT) tablet 175 mg  175 mg Oral Daily   • warfarin (COUMADIN) tablet 5 mg  5 mg Oral Daily (warfarin)       Imaging:  Reviewed       Physical Exam:    General: Alert and oriented, in no acute distress   CV: Regular rate   Respiratory: Regular rate   Abdominal: Soft, non-distended   Extremities: S/p B/l AKA, dressing in place   Neurologic: Grossly normal         Aure Rodriguez PA-C  1/9/2023 Chronic GERD

## 2023-01-10 LAB
GLUCOSE SERPL-MCNC: 106 MG/DL (ref 65–140)
GLUCOSE SERPL-MCNC: 140 MG/DL (ref 65–140)
GLUCOSE SERPL-MCNC: 150 MG/DL (ref 65–140)
GLUCOSE SERPL-MCNC: 179 MG/DL (ref 65–140)

## 2023-01-10 PROCEDURE — 99232 SBSQ HOSP IP/OBS MODERATE 35: CPT | Performed by: PSYCHIATRY & NEUROLOGY

## 2023-01-10 PROCEDURE — 82948 REAGENT STRIP/BLOOD GLUCOSE: CPT

## 2023-01-10 PROCEDURE — 99024 POSTOP FOLLOW-UP VISIT: CPT | Performed by: PHYSICIAN ASSISTANT

## 2023-01-10 RX ADMIN — WARFARIN SODIUM 5 MG: 5 TABLET ORAL at 17:25

## 2023-01-10 RX ADMIN — INSULIN LISPRO 1 UNITS: 100 INJECTION, SOLUTION INTRAVENOUS; SUBCUTANEOUS at 12:17

## 2023-01-10 RX ADMIN — DOXAZOSIN 4 MG: 4 TABLET ORAL at 21:17

## 2023-01-10 RX ADMIN — METHOCARBAMOL 1000 MG: 500 TABLET ORAL at 15:34

## 2023-01-10 RX ADMIN — ATORVASTATIN CALCIUM 40 MG: 40 TABLET, FILM COATED ORAL at 15:34

## 2023-01-10 RX ADMIN — ALUMINUM HYDROXIDE, MAGNESIUM HYDROXIDE, AND SIMETHICONE 30 ML: 200; 200; 20 SUSPENSION ORAL at 11:00

## 2023-01-10 RX ADMIN — OXYCODONE HYDROCHLORIDE 10 MG: 10 TABLET, FILM COATED, EXTENDED RELEASE ORAL at 21:17

## 2023-01-10 RX ADMIN — GABAPENTIN 400 MG: 400 CAPSULE ORAL at 09:52

## 2023-01-10 RX ADMIN — CLOPIDOGREL BISULFATE 75 MG: 75 TABLET ORAL at 09:52

## 2023-01-10 RX ADMIN — GABAPENTIN 400 MG: 400 CAPSULE ORAL at 15:34

## 2023-01-10 RX ADMIN — INSULIN GLARGINE 10 UNITS: 100 INJECTION, SOLUTION SUBCUTANEOUS at 21:18

## 2023-01-10 RX ADMIN — MICONAZOLE NITRATE 1 APPLICATION.: 20 CREAM TOPICAL at 09:55

## 2023-01-10 RX ADMIN — METOPROLOL SUCCINATE 100 MG: 100 TABLET, FILM COATED, EXTENDED RELEASE ORAL at 09:52

## 2023-01-10 RX ADMIN — GABAPENTIN 400 MG: 400 CAPSULE ORAL at 21:17

## 2023-01-10 RX ADMIN — BUPROPION HYDROCHLORIDE 150 MG: 150 TABLET, FILM COATED, EXTENDED RELEASE ORAL at 09:52

## 2023-01-10 RX ADMIN — PANTOPRAZOLE SODIUM 40 MG: 40 TABLET, DELAYED RELEASE ORAL at 05:17

## 2023-01-10 RX ADMIN — INSULIN LISPRO 1 UNITS: 100 INJECTION, SOLUTION INTRAVENOUS; SUBCUTANEOUS at 17:25

## 2023-01-10 RX ADMIN — FERROUS SULFATE TAB 325 MG (65 MG ELEMENTAL FE) 325 MG: 325 (65 FE) TAB at 11:00

## 2023-01-10 RX ADMIN — QUETIAPINE FUMARATE 25 MG: 25 TABLET ORAL at 21:16

## 2023-01-10 RX ADMIN — SERTRALINE HYDROCHLORIDE 175 MG: 100 TABLET ORAL at 09:52

## 2023-01-10 RX ADMIN — POTASSIUM CHLORIDE 20 MEQ: 1500 TABLET, EXTENDED RELEASE ORAL at 09:52

## 2023-01-10 RX ADMIN — SENNOSIDES 8.6 MG: 8.6 TABLET, FILM COATED ORAL at 09:52

## 2023-01-10 RX ADMIN — MICONAZOLE NITRATE 1 APPLICATION.: 20 CREAM TOPICAL at 17:25

## 2023-01-10 RX ADMIN — LISINOPRIL 40 MG: 20 TABLET ORAL at 09:52

## 2023-01-10 RX ADMIN — METHOCARBAMOL 1000 MG: 500 TABLET ORAL at 05:17

## 2023-01-10 RX ADMIN — METHOCARBAMOL 1000 MG: 500 TABLET ORAL at 21:16

## 2023-01-10 RX ADMIN — COLLAGENASE SANTYL: 250 OINTMENT TOPICAL at 09:56

## 2023-01-10 RX ADMIN — AMLODIPINE BESYLATE 10 MG: 10 TABLET ORAL at 09:52

## 2023-01-10 RX ADMIN — OXYCODONE HYDROCHLORIDE 10 MG: 10 TABLET, FILM COATED, EXTENDED RELEASE ORAL at 09:52

## 2023-01-10 NOTE — CASE MANAGEMENT
Case Management Discharge Planning Note    Patient name Aram Valerio  [address]/E4 8850 [facility]-* MRN [id number]  : 1960 Date 1/10/2023       Current Admission Date: 10/21/2022  Current Admission Diagnosis:PAD (peripheral artery disease) [facility])   Patient Active Problem List    Diagnosis Date Noted   • Mild protein-calorie malnutrition ([address] ) 2022   • Diarrhea 2022   • CVA (cerebral vascular accident) ([address] ) 2022   • Febrile 2022   • HIT (heparin-induced thrombocytopenia) 2022   • Diabetic ulcer of heel associated with diabetes mellitus due to underlying condition ([address] ) 11/10/2022   • Acute on chronic anemia 10/30/2022   • Generalized edema due to fluid overload 10/27/2022   • PAD (peripheral artery disease) ([address] ) 10/25/2022   • Non healing left heel wound 10/22/2022   • Hypertensive urgency 10/22/2022   • Hypokalemia 10/22/2022   • Wound of lower extremity 10/21/2022   • Epigastric pain 2022   • Type 2 diabetes mellitus with hyperglycemia, with long-term current use of insulin ([address] ) 10/18/2021   • Hyperparathyroidism ([address] ) 10/18/2021   • 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 ([address] ) 2021   • 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   • Moderate protein-calorie malnutrition ([address] ) 2020   • Acute colitis 2020   • Arthritis 2019   • Depression, recurrent ([address] ) 2018   • Class 3 severe obesity due to excess calories with serious comorbidity and body mass index (BMI) of 40 0 to 44 9 in adult [facility]) 2018   • Esophageal reflux 2018   • Uncontrolled type 2 diabetes with neuropathy 2018   • Diabetic peripheral neuropathy ([address] )    • Systolic murmur    • St[facility] ) 2015   • Anxiety 2015   • Vitamin D deficiency 2015   • Benign essential hypertension 2012   • Familial combined hyperlipidemia 2012      LOS (days): 80  Geometric Mean LOS (GMLOS) (days):   Days to GMLOS:     OBJECTIVE:  Risk of Unplanned Readmission Score: 30 88         Current admission status: Inpatient   Preferred Pharmacy:   [id number], [address] [place] [address] [address]  [address] [place] [address] [address]  [place] [address]  Phone: [phone] Fax: [phone]    Primary Care Provider: ALEX Leiva    Primary Insurance: BLUE CROSS  Secondary Insurance: [place] [employer]    DISCHARGE DETAILS:    Discharge planning discussed with[de-identified] Pt & dtr, Judye Gottron  Freedom of Choice: Yes  Comments - Freedom of Choice: If accepted, pt would like to go to [facility]  CM contacted family/caregiver?: Yes  Were Treatment Team discharge recommendations reviewed with patient/caregiver?: Yes  Did patient/caregiver verbalize understanding of patient care needs?: N/A- going to facility  Were patient/caregiver advised of the risks associated with not following Treatment Team discharge recommendations?: Yes    Contacts  Patient Contacts: Ryne Dorsey (dtr)  Relationship to Patient[de-identified] Family  Contact Method: Phone  Phone Number: [phone]  Reason/Outcome: Emergency Contact, Discharge Planning, Continuity of Care                        Treatment Team Recommendation: Short Term Rehab  Discharge Destination Plan[de-identified] Short Term Rehab                                         Additional Comments: CM met w/ pt at bedside & called dtr to update d/c planning  [facility] is re-reviewing pt at this time  They request that pt get re-evaled by psych & neuro-psych  Pt has already had her psyche eval done & they have cleared her for d/c to SNF  Now just waiting on neuro-psych to complete their assessment  Both pt & dtr in full agreement to d/c to [facility]  CM to keep them both updated once 2nd eval is completed & [facility] reviews final decision  CM to continue to follow pt care & d/c

## 2023-01-10 NOTE — ASSESSMENT & PLAN NOTE
65year old female former smoker w/HTN, HLD, uncontrolled type II DM (A1c 9 8), hx CVA, presenting with nonhealing extensive L heel ulceration and R hallux and 5th digit ulceration  She underwent multiple endovascular interventions and surgeries  She is now S/P bilateral AKA's  S/p multiple b/l endovascular interventions  JUSTIN  R hemispheric CVA    S/p L AKA 11/23/22 [facility])  S/p R AKA, wound debridement 12/1/22 [facility])  S/p R AKA wound/stump washout and VAC placement 12/16/22 [facility])  S/p R AKA wound/stump VAC change, L AKA stump washout/packing 12/19/22 ([facility])  Wound Cx: enterobacter cloacae     Recommendations:  - Bilateral tissue loss in the setting of uncontrolled type II DM and NYDIA on admission, now s/p bilateral AKA's as above followed by wound debridements  - Endovascular interventions, c/b atheroembolic event to the RLE and JUSTIN with R hemispheric CVA  - Local wound care to bilateral AKA's daily    • R wounds cleanse with saline; apply wet to dry dressing and cover with ABD, Rachid and ABD    • R medial thigh apply moisturizer to skin    • L AKA cleanse with saline; peroxide and leave to open air   - Appreciate ID for assistance with abx- L stump cultures growing out enterobacter cloacae  Patient completed doxycycline on 1/2/23  - Continue statin, Plavix and warfarin  - Medical management and glucose control per SLIM  - Pain management  - D/w case management, rehab/facility requesting psych and neuropsych consults for clearance prior to acceptance  Consults placed     - Disposition planning for long-term care placement; CM is working on placement, appreciate assistance

## 2023-01-10 NOTE — PROGRESS NOTES
[facility] [facility]  [facility] 1960, 65 y o  female MRN: [id number]  Unit/Bed#: E4 -01 Encounter: [id number]  Primary Care Provider: Martyn Kayser, CRNP   Date and time admitted to hospital: 10/21/2022  7:58 PM    * PAD (peripheral artery disease) [facility])  Assessment & Plan  65year old female former smoker w/HTN, HLD, uncontrolled type II DM (A1c 9 8), hx CVA, presenting with nonhealing extensive L heel ulceration and R hallux and 5th digit ulceration  She underwent multiple endovascular interventions and surgeries  She is now S/P bilateral AKA's  S/p multiple b/l endovascular interventions  JUSTIN  R hemispheric CVA    S/p L AKA 11/23/22 [facility])  S/p R AKA, wound debridement 12/1/22 [facility])  S/p R AKA wound/stump washout and VAC placement 12/16/22 [facility]  S/p R AKA wound/stump VAC change, L AKA stump washout/packing 12/19/22 ([facility])  Wound Cx: enterobacter cloacae     Recommendations:  - Bilateral tissue loss in the setting of uncontrolled type II DM and NYDIA on admission, now s/p bilateral AKA's as above followed by wound debridements  - Endovascular interventions, c/b atheroembolic event to the RLE and JUSTIN with R hemispheric CVA  - Local wound care to bilateral AKA's daily    • R wounds cleanse with saline; apply wet to dry dressing and cover with ABD, Rachid and ABD    • R medial thigh apply moisturizer to skin    • L AKA cleanse with saline; peroxide and leave to open air   - Appreciate ID for assistance with abx- L stump cultures growing out enterobacter cloacae  Patient completed doxycycline on 1/2/23  - Continue statin, Plavix and warfarin  - Medical management and glucose control per SLIM  - Pain management  - D/w case management, rehab/facility requesting psych and neuropsych consults for clearance prior to acceptance  Consults placed     - Disposition planning for long-term care placement; CM is working on placement, appreciate assistance Subjective: Patient remains stable  CM on board for placement  Patient will require pysch and neuropsych consults prior to placement  Vitals:  /72 (BP Location: Left arm)   Pulse 72   Temp 97 6 °F (36 4 °C) (Temporal)   Resp 18   Ht 5' 4" (1 626 m)   Wt 75 kg (165 lb 5 5 oz)   SpO2 95%   BMI 28 38 kg/m²     I/Os:  I/O last 3 completed shifts: In: 480 [P O :480]  Out: -   No intake/output data recorded  Lab Results and Cultures:   CBC with diff: Lab Results   Component Value Date    WBC 10 68 (H) 01/05/2023    HGB 9 4 (L) 01/05/2023    HCT 32 5 (L) 01/05/2023    MCV 86 01/05/2023     (H) 01/05/2023    MCH 24 9 (L) 01/05/2023    MCHC 28 9 (L) 01/05/2023    RDW 16 9 (H) 01/05/2023    MPV 8 5 (L) 01/05/2023    NRBC 0 12/27/2022   ,   BMP/CMP:  Lab Results   Component Value Date    SODIUM 141 12/24/2022    K 3 6 12/24/2022     12/24/2022    CO2 29 12/24/2022    BUN 8 12/24/2022    CREATININE 0 65 12/24/2022    CALCIUM 8 1 (L) 12/24/2022    AST 27 12/22/2022    ALT 13 12/22/2022    ALKPHOS 167 (H) 12/22/2022    EGFR 96 12/24/2022   ,   Lipid Panel: No results found for: CHOL,   Coags:   Lab Results   Component Value Date    PTT 95 (H) 12/12/2022    INR 2 29 (H) 01/09/2023   ,     Blood Culture:   Lab Results   Component Value Date    BLOODCX No Growth After 5 Days   11/13/2022    BLOODCX Staphylococcus coagulase negative (A) 11/13/2022   ,   Urinalysis: Lab Results   Component Value Date    COLORU Yellow 11/12/2022    CLARITYU Slightly Cloudy 11/12/2022    SPECGRAV 1 025 11/12/2022    PHUR 5 5 11/12/2022    PHUR 6 5 02/23/2018    LEUKOCYTESUR Trace (A) 11/12/2022    NITRITE Positive (A) 11/12/2022    GLUCOSEU Negative 11/12/2022    KETONESU Negative 11/12/2022    BILIRUBINUR Negative 11/12/2022    BLOODU Large (A) 11/12/2022   ,   Urine Culture:   Lab Results   Component Value Date    URINECX >100,000 cfu/ml Enterobacter cloacae complex (A) 11/12/2022    URINECX 70,000-79,000 cfu/ml Hillaryomyces ljanus (A) 11/12/2022   ,   Wound Culure: No results found for: WOUNDCULT    Medications:  Current Facility-Administered Medications   Medication Dose Route Frequency   • acetaminophen (TYLENOL) tablet 650 mg  650 mg Oral Q6H PRN   • ALPRAZolam (XANAX) tablet 0 25 mg  0 25 mg Oral Daily PRN   • aluminum-magnesium hydroxide-simethicone (MYLANTA) oral suspension 30 mL  30 mL Oral Q4H PRN   • amLODIPine (NORVASC) tablet 10 mg  10 mg Oral Daily   • atorvastatin (LIPITOR) tablet 40 mg  40 mg Oral Daily With Dinner   • buPROPion (WELLBUTRIN XL) 24 hr tablet 150 mg  150 mg Oral Daily   • clopidogrel (PLAVIX) tablet 75 mg  75 mg Oral Daily   • collagenase (SANTYL) ointment   Topical Daily   • doxazosin (CARDURA) tablet 4 mg  4 mg Oral HS   • ferrous sulfate tablet 325 mg  325 mg Oral Daily With Lunch   • gabapentin (NEURONTIN) capsule 400 mg  400 mg Oral TID   • insulin glargine (LANTUS) subcutaneous injection 10 Units 0 1 mL  10 Units Subcutaneous HS   • insulin lispro (HumaLOG) 100 units/mL subcutaneous injection 1-6 Units  1-6 Units Subcutaneous TID AC   • labetalol (NORMODYNE) injection 10 mg  10 mg Intravenous Q6H PRN   • lisinopril (ZESTRIL) tablet 40 mg  40 mg Oral Daily   • methocarbamol (ROBAXIN) tablet 1,000 mg  1,000 mg Oral Q8H [address]   • metoclopramide (REGLAN) injection 10 mg  10 mg Intravenous Q6H PRN   • metoprolol succinate (TOPROL-XL) 24 hr tablet 100 mg  100 mg Oral Daily   • moisture barrier miconazole 2% cream (aka GINA MOISTURE BARRIER ANTIFUNGAL CREAM)   Topical BID   • naloxone (NARCAN) 0 04 mg/mL syringe 0 04 mg  0 04 mg Intravenous Q1MIN PRN   • ondansetron (ZOFRAN) injection 4 mg  4 mg Intravenous Q6H PRN   • oxyCODONE (OxyCONTIN) 12 hr tablet 10 mg  10 mg Oral Q12H MICHELLE   • oxyCODONE (ROXICODONE) IR tablet 2 5 mg  2 5 mg Oral Q4H PRN    Or   • oxyCODONE (ROXICODONE) IR tablet 5 mg  5 mg Oral Q4H PRN    Or   • oxyCODONE (ROXICODONE) IR tablet 7 5 mg  7 5 mg Oral Q4H PRN   • pantoprazole (PROTONIX) EC tablet 40 mg  40 mg Oral Early Morning   • polyethylene glycol (MIRALAX) packet 17 g  17 g Oral BID   • potassium chloride (K-DUR,KLOR-CON) CR tablet 20 mEq  20 mEq Oral Daily   • QUEtiapine (SEROquel) tablet 25 mg  25 mg Oral HS   • senna (SENOKOT) tablet 8 6 mg  1 tablet Oral BID   • sertraline (ZOLOFT) tablet 175 mg  175 mg Oral Daily   • warfarin (COUMADIN) tablet 5 mg  5 mg Oral Daily (warfarin)       Imaging:  Reviewed    Sophia Dumont PA-C  1/10/2023

## 2023-01-10 NOTE — TELEMEDICINE
TeleConsultation - [facility] 65 y o  female MRN: [id number]  Unit/Bed#: E4 -01 Encounter: [id number]        REQUIRED DOCUMENTATION:     1  This service was provided via Telemedicine  2  Provider located at [place]  3  TeleMed provider: Monse Wallace MD   4  Identify all parties in room with patient during tele consult:  Patient  5  Patient was then informed that this was a Telemedicine visit and that the exam was being conducted confidentially over secure lines  My office door was closed  No one else was in the room  Patient acknowledged consent and understanding of privacy and security of the Telemedicine visit, and gave us permission to have the assistant stay in the room in order to assist with the history and to conduct the exam   I informed the patient that I have reviewed their record in Epic and presented the opportunity for them to ask any questions regarding the visit today  The patient agreed to participate  Assessment/Plan     Principal Problem:    PAD (peripheral artery disease) (HCC)  Active Problems:    Depression, recurrent (HCC)    Benign essential hypertension    Stroke (HCC)    Type 2 diabetes mellitus with hyperglycemia, with long-term current use of insulin (HCC)    Acute on chronic anemia    Diabetic ulcer of heel associated with diabetes mellitus due to underlying condition (HCC)    HIT (heparin-induced thrombocytopenia)    Febrile    Assessment:    Adjustment disorder with mixed emotional features now resolved; history of depression anxiety    Treatment Plan:    The patient denies any depression suicidal ideation and reports only occasional situational anxiety  There is no psychiatric contraindication for progression to rehab  No medication changes are indicated at this time  Repeat psychiatry as needed  No suicide precautions are indicated      Current Medications:     Current Facility-Administered Medications   Medication Dose Route Frequency Provider Last Rate   • acetaminophen  650 mg Oral Q6H PRN Destiny Alejandro, DO     • ALPRAZolam  0 25 mg Oral Daily PRN Destiny Alejandro, DO     • aluminum-magnesium hydroxide-simethicone  30 mL Oral Q4H PRN Destiny Alejandro, DO     • amLODIPine  10 mg Oral Daily Destiny Alejandro, DO     • atorvastatin  40 mg Oral Daily With [address], DO     • buPROPion  150 mg Oral Daily Destiny Alejandro, DO     • clopidogrel  75 mg Oral Daily Destiny Alejandro, DO     • collagenase   Topical Daily Destiny Alejandro, DO     • doxazosin  4 mg Oral HS Destiny Alejandro, DO     • ferrous sulfate  325 mg Oral Daily With Lunch Calogero Reba, DO     • gabapentin  400 mg Oral TID Destiny Alejandro, DO     • insulin glargine  10 Units Subcutaneous HS Destiny Alejandro, DO     • insulin lispro  1-6 Units Subcutaneous TID AC Destiny Alejandro, DO     • labetalol  10 mg Intravenous Q6H PRN Destiny Alejandro, DO     • lisinopril  40 mg Oral Daily Destiny Alejandro, DO     • methocarbamol  1,000 mg Oral [facility] Destiny Alejandro, DO     • metoclopramide  10 mg Intravenous Q6H PRN Destiny Alejandro, DO     • metoprolol succinate  100 mg Oral Daily Elaina Harrison MD     • GINA ANTIFUNGAL   Topical BID Monica Gould MD     • naloxone  0 04 mg Intravenous Q1MIN PRN Destiny Alejandro, DO     • ondansetron  4 mg Intravenous Q6H PRN Destiny Alejandro, DO     • oxyCODONE  10 mg Oral Q12H [facility] Destiny Alejandro, DO     • oxyCODONE  2 5 mg Oral Q4H PRN Destiny Alejandro, DO      Or   • oxyCODONE  5 mg Oral Q4H PRN Destiny Alejandro, DO      Or   • oxyCODONE  7 5 mg Oral Q4H PRN Destiny Alejandro, DO     • pantoprazole  40 mg Oral Early Morning Destiny Alejandro, DO     • polyethylene glycol  17 g Oral BID Destiny Alejandro, DO     • potassium chloride  20 mEq Oral Daily Elaina Harrison MD     • QUEtiapine  25 mg Oral HS Destiny Alejandro, DO     • senna  1 tablet Oral BID Jonathan Woods Rina Perez DO     • sertraline  175 mg Oral Daily Dominick Mensah DO     • warfarin  5 mg Oral Daily (warfarin) Gordo Barrera MD         Risks / Benefits of Treatment:    Risks, benefits, and possible side effects of medications explained to patient and patient verbalizes understanding  Inpatient consult to Psychiatry  Consult performed by: Ursula Rutledge MD  Consult ordered by: Zac Monreal PA-C        Physician Requesting Consult: Priscila Ward DO  Principal Problem:PAD (peripheral artery disease) [facility])    Reason for Consult: Psychiatric clearance needed for progression to rehab      History of Present Illness      Patient is a 65 y o  female who presented to the emergency department where the provider document the following:  Pt presenting to the ED with concern for b/l foot wounds, L worse than R- sent by her PCP for further evaluation  States she had a small area of skin disruption on the L heel about 8 months ago, was seen by podiatry who initially diagnosed it as a fissure  States she was caring for it initially but then it started to get better so she stopped  Reports over the last few months the wound over th L heal has continued to worsen and become increasingly painful, now necrotic with 2 smaller necrotic lesions over the medial aspect of the L shin  Also reports a black R 5th digit  Reports she does have T2DM on insulin however has not been checking her glucose regularly and has only been using her at home insulin intermittently 2/2 to financial issues  Reports she has had dyspnea with exertion over the last few months which she attributes to being sedentary 2/2 to having a hard time getting around due to the b/l foot pain  Denies associated CP, palpitations, diaphoresis, SOB at rest, pleuritic CP and lightheadedness  Reports her LLE is intermittently swollen but seems to improve with elevation  Denies fever and chills   Reports she did have nausea and vomiting last week but no N/V over the last few days  (+) Dysuria and increased urinary frequency over the last few weeks with azo taken without significant relief  Reports it feels like previous UTIs  No hematuria, malodorous urine, vaginal complaints, flank pain, or back pain  Denies cough, congestion, abdominal pain, N/V/D, HA, weakness or any other complaint  Has been eating and drinking normaly  No other complaints today  No history of MRSA infection and no recent abx usage  Denies alcohole and illicit drug use                Prior to Admission Medications   Prescriptions Last Dose Informant Patient Reported? Taking?    ALPRAZolam (XANAX) 0 25 mg tablet     No Yes   Sig: Take 1 tablet (0 25 mg total) by mouth daily as needed for anxiety (panic attacks)   Cholecalciferol (Vitamin D-3) 25 MCG (1000 UT) CAPS Not Taking at Unknown time   Yes No   Sig: Take 2,000 Units by mouth daily   Patient not taking: Reported on 10/21/2022   Continuous Blood Gluc  (FreeStyle Noé 14 Day [place]) LITO     No No   Sig: Use 1 Units continuous   Patient not taking: Reported on 10/21/2022   Continuous Blood Gluc Sensor (FreeStyle Noé 14 Day Sensor) MISC Not Taking at Unknown time   No No   Sig: Use daily as directed for CGM - Change every 14 days   Patient not taking: No sig reported   Doxylamine Succinate, Sleep, (UNISOM PO) Not Taking at Unknown time   Yes No   Sig: Take by mouth   Patient not taking: No sig reported   Insulin Pen Needle (B-D UF III MINI PEN NEEDLES) 31G X 5 MM MISC     No No   Sig: USE WITH INSULIN FOUR TIMES DAILY   Lancets 28G MISC Not Taking at Unknown time   No No   Sig: Use 1 each 4 (four) times a day   Patient not taking: No sig reported   Multiple Vitamin (multivitamin) tablet     Yes Yes   Sig: Take 1 tablet by mouth daily   Trulicity 5 14 VS/0 8TM SOPN     No Yes   Sig: INJECT 0 5 ML (0 75 MG TOTAL) UNDER THE SKIN ONCE A WEEK TUESDAY   amLODIPine (NORVASC) 10 mg tablet     No Yes   Sig: TAKE 1 TABLET BY MOUTH EVERY DAY aspirin 81 mg chewable tablet   Self Yes Yes   Sig: Chew 81 mg   atorvastatin (LIPITOR) 40 mg tablet     No Yes   Sig: TAKE 1 TABLET BY MOUTH EVERY DAY   clotrimazole-betamethasone (LOTRISONE) 1-0 05 % cream     No Yes   Sig: Apply topically 2 (two) times a day   fluocinonide (LIDEX) 0 05 % ointment     No Yes   Sig: Apply topically 2 (two) times a day   gabapentin (NEURONTIN) 100 mg capsule     No Yes   Sig: TAKE 2 CAPSULES BY MOUTH TWICE A DAY   glucose blood (True Metrix Blood Glucose Test) test strip Not Taking at Unknown time   No No   Sig: Use 1 each 4 (four) times a day   Patient not taking: No sig reported   insulin aspart (NovoLOG FlexPen) 100 UNIT/ML injection pen     No Yes   Sig: Inject 20 Units under the skin 3 (three) times a day with meals   insulin glargine (Lantus SoloStar) 100 units/mL injection pen     No Yes   Sig: Inject 60 Units under the skin daily at bedtime   lisinopril (ZESTRIL) 40 mg tablet     No Yes   Sig: TAKE 1 TABLET BY MOUTH EVERY DAY   metoprolol succinate (TOPROL-XL) 25 mg 24 hr tablet     No Yes   Sig: TAKE 1 TABLET (25 MG TOTAL) BY MOUTH DAILY  pantoprazole (PROTONIX) 40 mg tablet     No No   Sig: Take 1 tablet (40 mg total) by mouth daily for 14 days   potassium chloride (K-DUR,KLOR-CON) 10 mEq tablet     No No   Sig: Take 2 tablets (20 mEq total) by mouth 2 (two) times a day for 2 doses   sertraline (ZOLOFT) 100 mg tablet     No Yes   Sig: TAKE 1 TABLET BY MOUTH EVERY DAY      Facility-Administered Medications: None         Medical History[]Expand by Default        Past Medical History:   Diagnosis Date   • Anxiety     • Depression     • Diabetes [facility] )     • Diabetes mellitus ([facility] )     • Esophageal reflux       28 APR 2015 RESOLVED   • Hyperlipidemia     • Hypertension     • Low back pain       sciatica   • Pneumonia 2019   • Stroke ([facility] ) 12/2015     small vessel, L frontal corona radiata   Rx asa 81, Lipitor 40, risk modification   • Vitamin D deficiency         Surgical History[]Expand by Default         Past Surgical History:   Procedure Laterality Date   • COLONOSCOPY                Family History[]Expand by Default         Family History   Problem Relation Age of Onset   • Diabetes Mother     • Lung cancer Mother     • Cancer Father     • Lung cancer Father     • Hypertension Brother     • Hypertension Family           I have reviewed and agree with the history as documented            E-Cigarette/Vaping   • E-Cigarette Use Never User              E-Cigarette/Vaping Substances   • Nicotine No     • THC No     • CBD No     • Flavoring No     • Other No     • Unknown No        Social History      Tobacco Use   • Smoking status: Former Smoker       Types: Cigarettes       Quit date:        Years since quittin 8   • Smokeless tobacco: Never Used   Vaping Use   • Vaping Use: Never used   Substance Use Topics   • Alcohol use: No       Comment: OCCASIONAL ALCOHOL USE IN ALL SCRIPTS   • Drug use: No         Review of Systems   Constitutional: Negative for chills and fever  HENT: Negative for ear pain and sore throat  Eyes: Negative for pain and visual disturbance  Respiratory: Negative for cough and shortness of breath  OREILLY   Cardiovascular: Negative for chest pain and palpitations  Gastrointestinal: Negative for abdominal pain, diarrhea, nausea and vomiting  Genitourinary: Positive for dysuria and frequency  Negative for difficulty urinating, hematuria, urgency, vaginal bleeding, vaginal discharge and vaginal pain  Musculoskeletal: Negative for arthralgias, back pain and neck pain  Necrotic would over L heel, L shin and R 5th digit  Skin: Positive for wound  Negative for color change and rash  Neurological: Negative for seizures, syncope, weakness, light-headedness and headaches  All other systems reviewed and are negative      Past psychiatric history: Unremarkable per patient    Social history: The patient is single    She has 1 child  Prior to hospitalization she had been living with her daughter  She reports no history of    Family history: Unremarkable    Substance use history: The patient states she used to smoke more than 30 years ago  She denies use of alcohol and other substances  Mental status examination: The patient is alert and well oriented all spheres  Affect is pleasant and euthymic congruent with stated mood  Speech is unremarkable  Sensorium is clear  Thought processes logical and linear  Thought content is reality based  Associations are tight  Memory is intact in all spheres  She appears to be of average intelligence cardiovascular cannula, general fund of knowledge, sentence structure and syntax  She denies ever having experienced suicidal ideation  She admits that just after her bilateral above-the-knee leg amputations that she was in much pain greatly exacerbated when (were changed 4 times daily  She says this is very painful for her as she had made a comment to her daughter that she would rather be dead than go through that pain 4 times daily  She states having that she never had any suicidal ideation, plan or intent  She states she has not had his death wishes since just after amputation as her pain has been under good control and her mood has been good  She denies any depression  She states at times she may have some anxiety which she feels that she does not know what the treatment plan is whether there is ambiguity  She denies homicidal ideation  She denies hallucinations and other psychotic features  Insight and judgment are intact  She is highly motivated to comply with medical treatment plan      Past Medical History:   Diagnosis Date   • Anxiety    • Depression    • Diabetes ([place] [address] )    • Diabetes mellitus ([place] [address] )    • Esophageal reflux     28 APR 2015 RESOLVED   • Hyperlipidemia    • Hypertension    • Low back pain     sciatica   • Pneumonia 2019   • Stroke ([place] [address] ) 12/2015    small vessel, L frontal corona radiata  Rx asa 81, Lipitor 40, risk modification   • Vitamin D deficiency        Medical Review Of Systems:    Review of Systems    Meds/Allergies     all current active meds have been reviewed  Allergies   Allergen Reactions   • Heparin Other (See Comments)     History of HIT       Objective     Vital signs in last 24 hours:  Temp:  [97 3 °F (36 3 °C)-98 °F (36 7 °C)] 97 6 °F (36 4 °C)  HR:  [72-78] 72  Resp:  [17-18] 18  BP: (130-164)/(65-72) 164/72    No intake or output data in the 24 hours ending 01/10/23 1140        Lab Results: I have personally reviewed all pertinent laboratory/tests results  Imaging Studies: No results found  EKG/Pathology/Other Studies:   Lab Results   Component Value Date    VENTRATE 81 11/14/2022    ATRIALRATE 81 11/14/2022    PRINT 133 11/14/2022    QRSDINT 79 11/14/2022    QTINT 383 11/14/2022    QTCINT 445 11/14/2022    PAXIS 49 11/14/2022    QRSAXIS 44 11/14/2022    TWAVEAXIS 67 11/14/2022        Code Status: Level 1 - Full Code  Advance Directive and Living Will:      Power of : Yes  POLST:      Counseling / Coordination of Care: Total floor / unit time spent today 30 minutes  Greater than 50% of total time was spent with the patient and / or family counseling and / or coordination of care  A description of the counseling / coordination of care: Chart review, patient evaluation, coordination communication with staff, nursing and provider

## 2023-01-10 NOTE — CONSULTS
Consultation - Neuropsychology/Psychology Department  Amandeep Ro 65 y o  female MRN: [id number]  Unit/Bed#: E4 -01 Encounter: [id number]        Reason for Consultation:  Amandeep Ro is a 65y o  year old female who was referred for a Neuropsychological Exam to assess cognitive functioning and comment on capacity to make informed medical decisions        History of Present Illness  Peripheral Artery Disease    Physician Requesting Consult: Johnathan Ovalles DO    PROBLEM LIST:  Patient Active Problem List   Diagnosis   • Esophageal reflux   • Uncontrolled type 2 diabetes with neuropathy   • Class 3 severe obesity due to excess calories with serious comorbidity and body mass index (BMI) of 40 0 to 44 9 in [facility])   • Depression, recurrent ([facility])   • Anxiety   • Benign essential hypertension   • Stroke ([facility] )   • Diabetic peripheral neuropathy ([facility] )   • Vitamin D deficiency   • Systolic murmur   • Familial combined hyperlipidemia   • Arthritis   • Acute colitis   • Moderate protein-calorie malnutrition ([facility])   • 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   • 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 ([facility])   • Type 2 diabetes mellitus with hyperglycemia, with long-term current use of insulin ([facility])   • Hyperparathyroidism ([address] )   • Epigastric pain   • Wound of lower extremity   • Non healing left heel wound   • Hypertensive urgency   • Hypokalemia   • PAD (peripheral artery disease) ([facility])   • Generalized edema due to fluid overload   • Acute on chronic anemia   • Diabetic ulcer of heel associated with diabetes mellitus due to underlying condition ([facility])   • HIT (heparin-induced thrombocytopenia)   • CVA (cerebral vascular accident) ([address] )   • Febrile   • Diarrhea   • Mild protein-calorie malnutrition ([address] )         Historical Information   Past Medical History:   Diagnosis Date   • Anxiety    • Depression    • Diabetes ([facility] )    • Diabetes mellitus ([facility] ) • Esophageal reflux     28 APR 2015 RESOLVED   • Hyperlipidemia    • Hypertension    • Low back pain     sciatica   • Pneumonia 2019   • Stroke ([facility] ) 12/2015    small vessel, L frontal corona radiata  Rx asa 81, Lipitor 40, risk modification   • Vitamin D deficiency      Past Surgical History:   Procedure Laterality Date   • AMPUTATION ABOVE KNEE (AKA) Right 12/1/2022    Procedure: (AKA), PSB  BKA;  Surgeon: Dee Pardo DO;  Location: [facility];  Service: Vascular   • ARTERIOGRAM Right 11/7/2022    Procedure: -Right lower extremity angiogram -Right CFA balloon angioplasty with [id number]  [employer]  -Right CFA DCB with 6x40 Bard Lutonix -Right CFA atherectomy with Jetstream and distal embolization protection with 7mm Spider FX -Right SFA/Pop angioplasty with 4x40 Chololate balloon -Right SFA/Pop DCB with 5x150 Bard Lutonix -Right SFA stent with 6x80 W W  [employer] x2 -R   • COLONOSCOPY     • IR AORTAGRAM WITH RUN-OFF  10/31/2022   • IR LOWER EXTREMITY ANGIOGRAM  11/10/2022   • IR LOWER EXTREMITY ANGIOGRAM  11/7/2022   • LA AMPUTATION THIGH THROUGH FEMUR ANY LEVEL Left 11/23/2022    Procedure: (AKA); Surgeon: Yrn Thorne DO;  Location: [facility];  Service: Vascular   • LA NEGATIVE PRESSURE WOUND THERAPY DME </= 50 SQ CM Bilateral 12/19/2022    Procedure: Right above knee amputation washout; vac change; Left above knee amputation debridement; vac placement;  Surgeon: Dee Pardo DO;  Location: [facility];  Service: Vascular   • WOUND DEBRIDEMENT Right 12/16/2022    Procedure: AKA STUMP WASHOUT, Left AKA Staple removal  application of wound vac to Right AKA;   Surgeon: Kelvin Child MD;  Location: [facility];  Service: Vascular     Social History   Social History     Substance and Sexual Activity   Alcohol Use Never    Comment: OCCASIONAL ALCOHOL USE IN ALL SCRIPTS     Social History     Substance and Sexual Activity   Drug Use No     Social History     Tobacco Use Smoking Status Former   • Types: Cigarettes   • Quit date:    • Years since quittin 0   Smokeless Tobacco Never     Family History:   Family History   Problem Relation Age of Onset   • Diabetes Mother    • Lung cancer Mother    • Cancer Father    • Lung cancer Father    • Hypertension Brother    • Hypertension Family        Meds/Allergies   current meds:   Current Facility-Administered Medications   Medication Dose Route Frequency   • acetaminophen (TYLENOL) tablet 650 mg  650 mg Oral Q6H PRN   • ALPRAZolam (XANAX) tablet 0 25 mg  0 25 mg Oral Daily PRN   • aluminum-magnesium hydroxide-simethicone (MYLANTA) oral suspension 30 mL  30 mL Oral Q4H PRN   • amLODIPine (NORVASC) tablet 10 mg  10 mg Oral Daily   • atorvastatin (LIPITOR) tablet 40 mg  40 mg Oral Daily With Dinner   • buPROPion (WELLBUTRIN XL) 24 hr tablet 150 mg  150 mg Oral Daily   • clopidogrel (PLAVIX) tablet 75 mg  75 mg Oral Daily   • collagenase (SANTYL) ointment   Topical Daily   • doxazosin (CARDURA) tablet 4 mg  4 mg Oral HS   • ferrous sulfate tablet 325 mg  325 mg Oral Daily With Lunch   • gabapentin (NEURONTIN) capsule 400 mg  400 mg Oral TID   • insulin glargine (LANTUS) subcutaneous injection 10 Units 0 1 mL  10 Units Subcutaneous HS   • insulin lispro (HumaLOG) 100 units/mL subcutaneous injection 1-6 Units  1-6 Units Subcutaneous TID AC   • labetalol (NORMODYNE) injection 10 mg  10 mg Intravenous Q6H PRN   • lisinopril (ZESTRIL) tablet 40 mg  40 mg Oral Daily   • methocarbamol (ROBAXIN) tablet 1,000 mg  1,000 mg Oral Q8H [facility]   • metoclopramide (REGLAN) injection 10 mg  10 mg Intravenous Q6H PRN   • metoprolol succinate (TOPROL-XL) 24 hr tablet 100 mg  100 mg Oral Daily   • moisture barrier miconazole 2% cream (aka GINA MOISTURE BARRIER ANTIFUNGAL CREAM)   Topical BID   • naloxone (NARCAN) 0 04 mg/mL syringe 0 04 mg  0 04 mg Intravenous Q1MIN PRN   • ondansetron (ZOFRAN) injection 4 mg  4 mg Intravenous Q6H PRN   • oxyCODONE (OxyCONTIN) 12 hr tablet 10 mg  10 mg Oral Q12H [address]   • oxyCODONE (ROXICODONE) IR tablet 2 5 mg  2 5 mg Oral Q4H PRN    Or   • oxyCODONE (ROXICODONE) IR tablet 5 mg  5 mg Oral Q4H PRN    Or   • oxyCODONE (ROXICODONE) IR tablet 7 5 mg  7 5 mg Oral Q4H PRN   • pantoprazole (PROTONIX) EC tablet 40 mg  40 mg Oral Early Morning   • polyethylene glycol (MIRALAX) packet 17 g  17 g Oral BID   • potassium chloride (K-DUR,KLOR-CON) CR tablet 20 mEq  20 mEq Oral Daily   • QUEtiapine (SEROquel) tablet 25 mg  25 mg Oral HS   • senna (SENOKOT) tablet 8 6 mg  1 tablet Oral BID   • sertraline (ZOLOFT) tablet 175 mg  175 mg Oral Daily   • warfarin (COUMADIN) tablet 5 mg  5 mg Oral Daily (warfarin)       Allergies   Allergen Reactions   • Heparin Other (See Comments)     History of HIT         Family and Social Support:   Discharge planning discussed with[de-identified] Pt & [email or web address]  Freedom of Choice: Yes      Behavioral Observations: Alert, oriented x 3, cooperative and appeared pleasant; denied depressed mood and anxiety; no overt evidence of psychotic process; patient appeared aware of reason for hospitalization, and what she is being treated for in hospital;     Cognitive Examination    General Cognitive Functioning MMSE = Adequate orientation, registration of information and recall; deficit in working memory    Attention/Concentration Auditory Selective Attention = Average; Auditory Vigilance = Within Normal Limits; Information Processing Speed = Within Normal Limits    Frontal Systems/Executive Functioning Mental Flexibility/Cognitive Control = Average; Working Memory = Average Abstract Reasoning = Average; Generative Ability = Impaired,     Language Functioning Phonemic Fluency = Impaired; Semantic Retrieval = Average; Comprehension of Complex Ideational Material = Average;     Memory Functioning Narrative Recall - Short Delay = Average;  Long Delay Narrative Recall = Average;     Visuo-Spatial Abilities Not Assessed    Functional Knowledge  Health & Safety Knowledge = Average;     Summary/Impression:  Results of Neuropsychological Exam revealed cognitive deficit in generative  Ability/phonemic awareness with all other tested areas within normal limits  On a measure assessing awareness of personal health status and ability to evaluate health problems, handle medical emergencies and take safety precautions, patient performed within normal limits  During this encounter, patient appears to have capacity to make fully informed medical decisions

## 2023-01-11 LAB
FLUAV RNA RESP QL NAA+PROBE: NEGATIVE
FLUBV RNA RESP QL NAA+PROBE: NEGATIVE
GLUCOSE SERPL-MCNC: 105 MG/DL (ref 65–140)
GLUCOSE SERPL-MCNC: 115 MG/DL (ref 65–140)
GLUCOSE SERPL-MCNC: 139 MG/DL (ref 65–140)
GLUCOSE SERPL-MCNC: 182 MG/DL (ref 65–140)
INR PPP: 2.34 (ref 0.84–1.19)
PROTHROMBIN TIME: 25.5 SECONDS (ref 11.6–14.5)
RSV RNA RESP QL NAA+PROBE: NEGATIVE
SARS-COV-2 RNA RESP QL NAA+PROBE: NEGATIVE

## 2023-01-11 PROCEDURE — 91300 COVID-19 PFIZER VAC BIVALENT TRIS-SUCROSE 30 MCG/0.3ML SUSP: CPT | Performed by: PHYSICIAN ASSISTANT

## 2023-01-11 PROCEDURE — 97530 THERAPEUTIC ACTIVITIES: CPT

## 2023-01-11 PROCEDURE — 0241U HB NFCT DS VIR RESP RNA 4 TRGT: CPT | Performed by: PHYSICIAN ASSISTANT

## 2023-01-11 PROCEDURE — 0001A HB IMMUNIZATION ADMIN COVID-19 30MCG/0.3ML FIRST DOSE: CPT | Performed by: PHYSICIAN ASSISTANT

## 2023-01-11 PROCEDURE — 99024 POSTOP FOLLOW-UP VISIT: CPT | Performed by: PHYSICIAN ASSISTANT

## 2023-01-11 PROCEDURE — 82948 REAGENT STRIP/BLOOD GLUCOSE: CPT

## 2023-01-11 PROCEDURE — 85610 PROTHROMBIN TIME: CPT | Performed by: PHYSICIAN ASSISTANT

## 2023-01-11 PROCEDURE — 97110 THERAPEUTIC EXERCISES: CPT

## 2023-01-11 PROCEDURE — 97535 SELF CARE MNGMENT TRAINING: CPT

## 2023-01-11 RX ADMIN — GABAPENTIN 400 MG: 400 CAPSULE ORAL at 09:36

## 2023-01-11 RX ADMIN — GABAPENTIN 400 MG: 400 CAPSULE ORAL at 20:36

## 2023-01-11 RX ADMIN — OXYCODONE HYDROCHLORIDE 10 MG: 10 TABLET, FILM COATED, EXTENDED RELEASE ORAL at 20:37

## 2023-01-11 RX ADMIN — COLLAGENASE SANTYL 1 APPLICATION.: 250 OINTMENT TOPICAL at 09:40

## 2023-01-11 RX ADMIN — WARFARIN SODIUM 5 MG: 5 TABLET ORAL at 16:13

## 2023-01-11 RX ADMIN — POTASSIUM CHLORIDE 20 MEQ: 1500 TABLET, EXTENDED RELEASE ORAL at 09:36

## 2023-01-11 RX ADMIN — GABAPENTIN 400 MG: 400 CAPSULE ORAL at 16:13

## 2023-01-11 RX ADMIN — OXYCODONE HYDROCHLORIDE 10 MG: 10 TABLET, FILM COATED, EXTENDED RELEASE ORAL at 09:36

## 2023-01-11 RX ADMIN — PANTOPRAZOLE SODIUM 40 MG: 40 TABLET, DELAYED RELEASE ORAL at 05:09

## 2023-01-11 RX ADMIN — METHOCARBAMOL 1000 MG: 500 TABLET ORAL at 21:58

## 2023-01-11 RX ADMIN — ATORVASTATIN CALCIUM 40 MG: 40 TABLET, FILM COATED ORAL at 16:12

## 2023-01-11 RX ADMIN — INSULIN GLARGINE 10 UNITS: 100 INJECTION, SOLUTION SUBCUTANEOUS at 21:58

## 2023-01-11 RX ADMIN — SERTRALINE HYDROCHLORIDE 175 MG: 100 TABLET ORAL at 09:36

## 2023-01-11 RX ADMIN — MICONAZOLE NITRATE 1 APPLICATION.: 20 CREAM TOPICAL at 09:40

## 2023-01-11 RX ADMIN — METOPROLOL SUCCINATE 100 MG: 100 TABLET, FILM COATED, EXTENDED RELEASE ORAL at 09:36

## 2023-01-11 RX ADMIN — AMLODIPINE BESYLATE 10 MG: 10 TABLET ORAL at 09:36

## 2023-01-11 RX ADMIN — CLOPIDOGREL BISULFATE 75 MG: 75 TABLET ORAL at 09:36

## 2023-01-11 RX ADMIN — METHOCARBAMOL 1000 MG: 500 TABLET ORAL at 05:09

## 2023-01-11 RX ADMIN — BUPROPION HYDROCHLORIDE 150 MG: 150 TABLET, FILM COATED, EXTENDED RELEASE ORAL at 09:36

## 2023-01-11 RX ADMIN — METHOCARBAMOL 1000 MG: 500 TABLET ORAL at 13:58

## 2023-01-11 RX ADMIN — FERROUS SULFATE TAB 325 MG (65 MG ELEMENTAL FE) 325 MG: 325 (65 FE) TAB at 12:01

## 2023-01-11 RX ADMIN — DOXAZOSIN 4 MG: 4 TABLET ORAL at 21:58

## 2023-01-11 RX ADMIN — LISINOPRIL 40 MG: 20 TABLET ORAL at 09:36

## 2023-01-11 RX ADMIN — QUETIAPINE FUMARATE 25 MG: 25 TABLET ORAL at 21:58

## 2023-01-11 RX ADMIN — INSULIN LISPRO 1 UNITS: 100 INJECTION, SOLUTION INTRAVENOUS; SUBCUTANEOUS at 11:20

## 2023-01-11 RX ADMIN — BNT162B2 ORIGINAL AND OMICRON BA.4/BA.5 0.3 ML: .024; .024 INJECTION, SUSPENSION INTRAMUSCULAR at 17:43

## 2023-01-11 NOTE — PLAN OF CARE
Problem: OCCUPATIONAL THERAPY ADULT  Goal: Performs self-care activities at highest level of function for planned discharge setting  See evaluation for individualized goals  Description: Treatment Interventions: ADL retraining, Functional transfer training, UE strengthening/ROM, Endurance training, Cognitive reorientation, Patient/family training, Equipment evaluation/education, Compensatory technique education, Energy conservation, Activityengagement          See flowsheet documentation for full assessment, interventions and recommendations  Note: Limitation: Decreased ADL status, Decreased UE strength, Decreased Safe judgement during ADL, Decreased cognition, Decreased endurance, Decreased high-level ADLs  Prognosis: Fair  Assessment: Pt seen for skilled OT session focused on ADLs, bed mobility, EOB sitting and UE exercises  Pt w/ MIN assist x2 rolling in bed as pt incontinent of stool and total assist for hygiene cleanup  Pt w/ MAX assist x2 supine>sit bed mobility w/ increased time to complete  Pt w/ EOB sitting x12 minutes w/ min assist to close supervision w/ b/l UE supports on rails  Pt w/ MOD assist x2 sit>supine bed mobility w/ increased time to complete  Pt w/ MIN assist grooming task able to wash face and assist w/ brushing out knots in back of head  Pt completed b/l UE exercises w/ theraband to increase strength and endurance and cues for correct positioning  Pt seated upright w/ all needs met  Pt continues to be limited due to dynamic sitting balance, impaired activity tolerance, fall risk, decreased strength and endurance, impaired insight all causing a decline in ADLs, functional transfers and mobility  Recommend STR when medically stable  The patient's raw score on the AM-PAC Daily Activity inpatient short form is 14, standardized score is 33 39, less than 39 4  Patients at this level are likely to benefit from discharge to post-acute rehabilitation services   Please refer to the recommendation of the Occupational Therapist for safe discharge planning       OT Discharge Recommendation: Post acute rehabilitation services

## 2023-01-11 NOTE — WOUND OSTOMY CARE
Progress Note - Wound   Faulkner Myke 65 y o  female MRN: [id number]  Unit/Bed#: E4 -01 Encounter: [id number]        Assessment:   Patient seen for weekly wound care follow-up  She is agreeable to assessment  On low air-loss mattress, positioning wedges in use  Patient able to turn in bed with minimal assist x 1  She is incontinent of bowel and bladder  Skin folds intact  Nutrition team following, dietary supplements ordered  Left stump incision open to air, right with dressing dry and intact per vascular surgery team   1   Wound to left perineum--RESOLVED  2   Wound to left medial nasreen-rectal area--RESOLVED  3   Hospital acquired deep tissue pressure injury to left inner buttock--RESOLVED  Entire buttocks, sacrum, and perineum intact, pink, and blanchable at this time without evidence of candidiasis  Wound Care Plan:   1-P500 low air-loss mattress  2-Lower extremity wounds per surgery team   3-Moisturize skin daily with lotion     4-Turn/reposition every 2 hours while in bed using positioning wedges; and weight shift frequently while in chair for pressure re-distribution on skin    5-Offloading air cushion if/when out of bed to chair  6-Sacrum/buttocks--cleanse with soap and water, pat dry  Apply Hydraguard lotion three times daily and as needed with incontinence care  Wound care team will sign-off at this time  Wound 12/29/22 Pressure Injury Buttocks Medial;Distal;Left (Active)   Wound Image   01/11/23 1439   Wound Description Clean; Intact;Dry;Pink 01/11/23 1439   Nasreen-wound Assessment Intact; Pink 01/11/23 1439   Wound Length (cm) 0 cm 01/11/23 1439   Wound Width (cm) 0 cm 01/11/23 1439   Wound Depth (cm) 0 cm 01/11/23 1439   Wound Surface Area (cm^2) 0 cm^2 01/11/23 1439   Wound Volume (cm^3) 0 cm^3 01/11/23 1439   Calculated Wound Volume (cm^3) 0 cm^3 01/11/23 1439       Wound 12/30/22 Perineum Left (Active)   Wound Image   01/11/23 1439   Wound Description Clean;Dry; Intact; Pink 01/11/23 1439   Michaela-wound Assessment Intact; Pink 01/11/23 1439   Wound Length (cm) 0 cm 01/11/23 1439   Wound Width (cm) 0 cm 01/11/23 1439   Wound Depth (cm) 0 cm 01/11/23 1439   Wound Surface Area (cm^2) 0 cm^2 01/11/23 1439   Wound Volume (cm^3) 0 cm^3 01/11/23 1439   Calculated Wound Volume (cm^3) 0 cm^3 01/11/23 1439   Change in Wound Size % 100 01/11/23 [facility] BSN, RN, [employer]

## 2023-01-11 NOTE — CASE MANAGEMENT
Case Management Discharge Planning Note    Patient name Rebeka Hartley  Location [address] [address] [address]/E4 [address]-* MRN [id number]  : 1960 Date 2023       Current Admission Date: 10/21/2022  Current Admission Diagnosis:PAD (peripheral artery disease) [facility])   Patient Active Problem List    Diagnosis Date Noted   • Mild protein-calorie malnutrition ([address] ) 2022   • Diarrhea 2022   • CVA (cerebral vascular accident) ([address] ) 2022   • Febrile 2022   • HIT (heparin-induced thrombocytopenia) 2022   • Diabetic ulcer of heel associated with diabetes mellitus due to underlying condition ([address] ) 11/10/2022   • Acute on chronic anemia 10/30/2022   • Generalized edema due to fluid overload 10/27/2022   • PAD (peripheral artery disease) ([address] ) 10/25/2022   • Non healing left heel wound 10/22/2022   • Hypertensive urgency 10/22/2022   • Hypokalemia 10/22/2022   • Wound of lower extremity 10/21/2022   • Epigastric pain 2022   • Type 2 diabetes mellitus with hyperglycemia, with long-term current use of insulin ([address] ) 10/18/2021   • Hyperparathyroidism ([address] ) 10/18/2021   • 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 ) 2015   • Anxiety 2015   • Vitamin D deficiency 2015   • Benign essential hypertension 2012   • Familial combined hyperlipidemia 2012      LOS (days): 81  Geometric Mean LOS (GMLOS) (days):   Days to GMLOS:     OBJECTIVE:  Risk of Unplanned Readmission Score: 30 88         Current admission status: Inpatient   Preferred Pharmacy:   [id number], [address] [place] [address] [address]  [address] [place] [address] [address]  [facility] [address]  Phone: [phone] Fax: [phone]    Primary Care Provider: ALEX Fraire    Primary Insurance: BLUE CROSS  Secondary Insurance: [place] [employer]    DISCHARGE DETAILS:    Discharge planning discussed with[de-identified] Pt & dtr Alyssa Abraham  Freedom of Choice: Yes  Comments - Freedom of Choice: Pt & dtr agreeable to d/c to [facility] [place] [facility]  CM contacted family/caregiver?: Yes  Were Treatment Team discharge recommendations reviewed with patient/caregiver?: Yes  Did patient/caregiver verbalize understanding of patient care needs?: N/A- going to facility  Were patient/caregiver advised of the risks associated with not following Treatment Team discharge recommendations?: Yes    Contacts  Patient Contacts: Mesha Mirza (dtr)  Relationship to Patient[de-identified] Family  Contact Method: Phone  Phone Number: [phone] - left   Reason/Outcome: Emergency Contact, Discharge Planning, Continuity of Care                        Treatment Team Recommendation: Short Term Rehab  Discharge Destination Plan[de-identified] Short Term Rehab                                         Additional Comments: CM met w/ pt at bedside to discuss her discharge plan  Pt has been accepted to [facility] [place] [facility] & can go tomorrow  Dtr Alyssa Abraham & pt agreeable to d/c to facility tomorrow  Facility did request a covid booster, pt agreeable to get covid booster at time of discharge  CM will set up early morning transport  Pt & dtr denied having any questions or concerns at this time  CM to continue to follow pt care & d/c

## 2023-01-11 NOTE — OCCUPATIONAL THERAPY NOTE
Occupational Therapy Progress Note     Patient Name: Vandana PHILLIPSU Date: 1/11/2023  Problem List  Principal Problem:    PAD (peripheral artery disease) ([facility] )  Active Problems:    Depression, recurrent ([facility] )    Benign essential hypertension    Stroke ([facility] )    Type 2 diabetes mellitus with hyperglycemia, with long-term current use of insulin (HCC)    Acute on chronic anemia    Diabetic ulcer of heel associated with diabetes mellitus due to underlying condition [facility])    HIT (heparin-induced thrombocytopenia)    Febrile            01/11/23 1425   OT Last Visit   OT Visit Date 01/11/23   Note Type   Note Type Treatment   Pain Assessment   Pain Assessment Tool 0-10   Pain Score No Pain   Restrictions/Precautions   Weight Bearing Precautions Per Order Yes   Other Precautions Contact/isolation; Fall Risk;Pain;WBS   ADL   Where Assessed Edge of bed   Grooming Assistance 4  Minimal Assistance   Grooming Deficit Setup;Verbal cueing;Supervision/safety; Increased time to complete;Brushing hair;Wash/dry face; Wash/dry hands   Grooming Comments assist to get knots out of hair w/ increased time   UB Dressing Assistance 4  Minimal Assistance   UB Dressing Deficit Setup;Verbal cueing;Supervision/safety; Increased time to complete   Toileting Assistance  1  Total Assistance   Toileting Deficit Setup;Verbal cueing;Supervison/safety; Increased time to complete;Perineal hygiene   Toileting Comments incontinent of bowel   Bed Mobility   Rolling R 4  Minimal assistance   Additional items Assist x 2; Increased time required;LE management;Verbal cues; Bedrails   Rolling L 4  Minimal assistance   Additional items Assist x 2; Increased time required;LE management;Verbal cues;HOB elevated; Bedrails   Supine to Sit 2  Maximal assistance   Additional items Assist x 2; Increased time required;Verbal cues;LE management;HOB elevated; Bedrails   Sit to Supine 3  Moderate assistance   Additional items Assist x 2; Increased time required;Verbal cues;LE management; Bedrails   Additional Comments increased time to complete; EOB tolerance to 12-15 minutes w/ min assist support and b/l UEs on rails   Therapeutic Exercise - ROM   UE-ROM Yes   Therapeutic Excerise-Strength   UE Strength Yes   Right Upper Extremity- Strength   R Shoulder Flexion; Extension;Horizontal ABduction  (triceps)   R Elbow Elbow flexion;Elbow extension  (pronation/supination, punchouts)   R Weight/Reps/Sets 2 sets [id number]   RUE Strength Comment tolerated well   Left Upper Extremity-Strength   L Shoulder Flexion; Extension;Horizontal ABduction  (triceps)   L Elbow Elbow flexion;Elbow extension  (pronation/supination, punchouts)   L Weights/Reps/Sets 2 sets x 10reps   LUE Strength Comment tolerated well   Cognition   Overall Cognitive Status Impaired   Arousal/Participation Alert; Cooperative   Attention Attends with cues to redirect   Orientation Level Oriented to person;Oriented to place;Oriented to time   Memory Decreased short term memory;Decreased recall of recent events   Following Commands Follows one step commands without difficulty   Comments pt reoriented to situation as pt thought she left hospital 2 days ago and some confusion noted, pleasant and cooperative; motivated to get better   Additional Activities   Additional Activities   (education on positioning and UE exercises)   Additional Activities Comments pt receptive   Activity Tolerance   Activity Tolerance Patient limited by fatigue;Patient limited by pain;Treatment limited secondary to medical complications (Comment)   Medical Staff Made Aware appropriate to see per Yesenia HERNÁNDEZ   Assessment   Assessment Pt seen for skilled OT session focused on ADLs, bed mobility, EOB sitting and UE exercises  Pt w/ MIN assist x2 rolling in bed as pt incontinent of stool and total assist for hygiene cleanup  Pt w/ MAX assist x2 supine>sit bed mobility w/ increased time to complete   Pt w/ EOB sitting x12 minutes w/ min assist to close supervision w/ b/l UE supports on rails  Pt w/ MOD assist x2 sit>supine bed mobility w/ increased time to complete  Pt w/ MIN assist grooming task able to wash face and assist w/ brushing out knots in back of head  Pt completed b/l UE exercises w/ theraband to increase strength and endurance and cues for correct positioning  Pt seated upright w/ all needs met  Pt continues to be limited due to dynamic sitting balance, impaired activity tolerance, fall risk, decreased strength and endurance, impaired insight all causing a decline in ADLs, functional transfers and mobility  Recommend STR when medically stable  The patient's raw score on the AM-PAC Daily Activity inpatient short form is 14, standardized score is 33 39, less than 39 4  Patients at this level are likely to benefit from discharge to post-acute rehabilitation services  Please refer to the recommendation of the Occupational Therapist for safe discharge planning  Plan   Treatment Interventions ADL retraining;Functional transfer training;UE strengthening/ROM; Endurance training;Cognitive reorientation;Patient/family training;Equipment evaluation/education; Compensatory technique education; Energy conservation; Activityengagement   Goal Expiration Date 01/17/23   OT Treatment Day 21   OT Frequency 3-5x/wk   Recommendation   OT Discharge Recommendation Post acute rehabilitation services   [facility] Daily Activity Inpatient   Lower Body Dressing 1   Bathing 2   Toileting 1   Upper Body Dressing 3   Grooming 3   Eating 4   Daily Activity Raw Score 14   Daily Activity Standardized Score (Calc for Raw Score >=11) 33 39   AM-PAC Applied Cognition Inpatient   Following a Speech/Presentation 4   Understanding Ordinary Conversation 4   Taking Medications 3   Remembering Where Things Are Placed or Put Away 3   Remembering List of 4-5 Errands 3   Taking Care of Complicated Tasks 2   Applied Cognition Raw Score 19   Applied Cognition Standardized Score 39 77   Modified Sherry Scale   Modified [place] Scale 5   End of Consult   Education Provided Yes   Patient Position at End of Consult Supine;Bed/Chair alarm activated; All needs within reach   Nurse Communication Nurse aware of consult     Documentation completed by: Jose Bergman MS, OTR/L

## 2023-01-11 NOTE — PROGRESS NOTES
[facility]  Progress Note - [facility] 1960, 65 y o  female MRN: [id number]  Unit/Bed#: E4 -01 Encounter: [id number]  Primary Care Provider: ALEX Ramey   Date and time admitted to hospital: 10/21/2022  7:58 PM    * PAD (peripheral artery disease) [facility])  Assessment & Plan  65year old female former smoker w/HTN, HLD, uncontrolled type II DM (A1c 9 8), hx CVA, presenting with nonhealing extensive L heel ulceration and R hallux and 5th digit ulceration  She underwent multiple endovascular interventions and surgeries  She is now S/P bilateral AKA's  S/p multiple b/l endovascular interventions  JUSTIN  R hemispheric CVA    S/p L AKA 11/23/22 [facility])  S/p R AKA, wound debridement 12/1/22 [facility])  S/p R AKA wound/stump washout and VAC placement 12/16/22 [facility])  S/p R AKA wound/stump VAC change, L AKA stump washout/packing 12/19/22 ([facility])  Wound Cx: enterobacter cloacae     Recommendations:  - Bilateral tissue loss in the setting of uncontrolled type II DM and NYDIA on admission, now s/p bilateral AKA's as above followed by wound debridements  - Endovascular interventions, c/b atheroembolic event to the RLE and JUSTIN with R hemispheric CVA  - Local wound care to bilateral AKA's daily    • R wounds cleanse with saline; apply wet to dry dressing and cover with ABD, Rachid and ABD    • R medial thigh apply moisturizer to skin    • L AKA cleanse with saline; peroxide and leave to open air   - Appreciate ID for assistance with abx- L stump cultures growing out enterobacter cloacae  Patient completed doxycycline on 1/2/23  - Continue statin, Plavix and warfarin  - Medical management and glucose control per SLIM  - Pain management  - D/w case management, rehab/facility requesting psych and neuropsych consults for clearance prior to acceptance  Both have been obtained and patient is cleared      - Disposition planning for long-term care placement; CM is working on placement, appreciate assistance       Subjective: Patient seen and examined  Overall offers no complaints  Denies pain  Bilateral stumps are clean  Wounds with healthy granulation tissue  Awaiting placement     Vitals:  /75 (BP Location: Right arm)   Pulse 69   Temp 97 6 °F (36 4 °C) (Temporal)   Resp 18   Ht 5' 4" (1 626 m)   Wt 75 1 kg (165 lb 9 1 oz)   SpO2 96%   BMI 28 42 kg/m²     I/Os:  No intake/output data recorded  No intake/output data recorded  Lab Results and Cultures:   CBC with diff: Lab Results   Component Value Date    WBC 10 68 (H) 01/05/2023    HGB 9 4 (L) 01/05/2023    HCT 32 5 (L) 01/05/2023    MCV 86 01/05/2023     (H) 01/05/2023    MCH 24 9 (L) 01/05/2023    MCHC 28 9 (L) 01/05/2023    RDW 16 9 (H) 01/05/2023    MPV 8 5 (L) 01/05/2023    NRBC 0 12/27/2022   ,   BMP/CMP:  Lab Results   Component Value Date    SODIUM 141 12/24/2022    K 3 6 12/24/2022     12/24/2022    CO2 29 12/24/2022    BUN 8 12/24/2022    CREATININE 0 65 12/24/2022    CALCIUM 8 1 (L) 12/24/2022    AST 27 12/22/2022    ALT 13 12/22/2022    ALKPHOS 167 (H) 12/22/2022    EGFR 96 12/24/2022   ,   Lipid Panel: No results found for: CHOL,   Coags:   Lab Results   Component Value Date    PTT 95 (H) 12/12/2022    INR 2 34 (H) 01/11/2023   ,     Blood Culture:   Lab Results   Component Value Date    BLOODCX No Growth After 5 Days   11/13/2022    BLOODCX Staphylococcus coagulase negative (A) 11/13/2022   ,   Urinalysis: Lab Results   Component Value Date    COLORU Yellow 11/12/2022    CLARITYU Slightly Cloudy 11/12/2022    SPECGRAV 1 025 11/12/2022    PHUR 5 5 11/12/2022    PHUR 6 5 02/23/2018    LEUKOCYTESUR Trace (A) 11/12/2022    NITRITE Positive (A) 11/12/2022    GLUCOSEU Negative 11/12/2022    KETONESU Negative 11/12/2022    BILIRUBINUR Negative 11/12/2022    BLOODU Large (A) 11/12/2022   ,   Urine Culture:   Lab Results   Component Value Date    URINECX >100,000 cfu/ml Enterobacter cloacae complex (A) 11/12/2022    URINECX 70,000-79,000 cfu/ml Klisabelomyces ljanus (A) 11/12/2022   ,   Wound Culure: No results found for: WOUNDCULT    Medications:  Current Facility-Administered Medications   Medication Dose Route Frequency   • acetaminophen (TYLENOL) tablet 650 mg  650 mg Oral Q6H PRN   • ALPRAZolam (XANAX) tablet 0 25 mg  0 25 mg Oral Daily PRN   • aluminum-magnesium hydroxide-simethicone (MYLANTA) oral suspension 30 mL  30 mL Oral Q4H PRN   • amLODIPine (NORVASC) tablet 10 mg  10 mg Oral Daily   • atorvastatin (LIPITOR) tablet 40 mg  40 mg Oral Daily With Dinner   • buPROPion (WELLBUTRIN XL) 24 hr tablet 150 mg  150 mg Oral Daily   • clopidogrel (PLAVIX) tablet 75 mg  75 mg Oral Daily   • collagenase (SANTYL) ointment   Topical Daily   • doxazosin (CARDURA) tablet 4 mg  4 mg Oral HS   • ferrous sulfate tablet 325 mg  325 mg Oral Daily With Lunch   • gabapentin (NEURONTIN) capsule 400 mg  400 mg Oral TID   • insulin glargine (LANTUS) subcutaneous injection 10 Units 0 1 mL  10 Units Subcutaneous HS   • insulin lispro (HumaLOG) 100 units/mL subcutaneous injection 1-6 Units  1-6 Units Subcutaneous TID AC   • labetalol (NORMODYNE) injection 10 mg  10 mg Intravenous Q6H PRN   • lisinopril (ZESTRIL) tablet 40 mg  40 mg Oral Daily   • methocarbamol (ROBAXIN) tablet 1,000 mg  1,000 mg Oral Q8H [address]   • metoclopramide (REGLAN) injection 10 mg  10 mg Intravenous Q6H PRN   • metoprolol succinate (TOPROL-XL) 24 hr tablet 100 mg  100 mg Oral Daily   • moisture barrier miconazole 2% cream (aka GINA MOISTURE BARRIER ANTIFUNGAL CREAM)   Topical BID   • naloxone (NARCAN) 0 04 mg/mL syringe 0 04 mg  0 04 mg Intravenous Q1MIN PRN   • ondansetron (ZOFRAN) injection 4 mg  4 mg Intravenous Q6H PRN   • oxyCODONE (OxyCONTIN) 12 hr tablet 10 mg  10 mg Oral Q12H MICHELLE   • oxyCODONE (ROXICODONE) IR tablet 2 5 mg  2 5 mg Oral Q4H PRN    Or   • oxyCODONE (ROXICODONE) IR tablet 5 mg  5 mg Oral Q4H PRN    Or   • oxyCODONE (ROXICODONE) IR tablet 7 5 mg  7 5 mg Oral Q4H PRN   • pantoprazole (PROTONIX) EC tablet 40 mg  40 mg Oral Early Morning   • polyethylene glycol (MIRALAX) packet 17 g  17 g Oral BID   • potassium chloride (K-DUR,KLOR-CON) CR tablet 20 mEq  20 mEq Oral Daily   • QUEtiapine (SEROquel) tablet 25 mg  25 mg Oral HS   • senna (SENOKOT) tablet 8 6 mg  1 tablet Oral BID   • sertraline (ZOLOFT) tablet 175 mg  175 mg Oral Daily   • warfarin (COUMADIN) tablet 5 mg  5 mg Oral Daily (warfarin)       Imaging:  Reviewed    Physical Exam:    General: Alert  In no acute distress  CV: Regular rate   Respiratory: Normal effort   Abdominal: Soft, non-distended   Extremities: Bilateral AKA with wounds  Clean wounds without drainage or evidence of infection     Neurologic: Grossly normal         Stephen Del Valle PA-C  1/11/2023

## 2023-01-11 NOTE — PHYSICAL THERAPY NOTE
PT PROGRESS NOTE    Name: Mian Sequeira  AGE: 65 y o  MRN: [id number]  LENGTH OF STAY: 80    Admitting Dx:  Hypokalemia [E87 6]  UTI (urinary tract infection) [N39 0]  OREILLY (dyspnea on exertion) [R06 09]  Hypertension [I10]  Necrotic toes (HCC) [I96]  Non healing left heel wound [S91 302A]  Open wound of foot, complicated, left, initial encounter [S91 302A]  Sepsis ([address] ) [A41 9]      Patient Active Problem List   Diagnosis    Esophageal reflux    Uncontrolled type 2 diabetes with neuropathy    Class 3 severe obesity due to excess calories with serious comorbidity and body mass index (BMI) of 40 0 to 44 9 in adult (HCC)    Depression, recurrent (HCC)    Anxiety    Benign essential hypertension    Stroke (HCC)    Diabetic peripheral neuropathy (HCC)    Vitamin D deficiency    Systolic murmur    Familial combined hyperlipidemia    Arthritis    Acute colitis    Moderate protein-calorie malnutrition (HCC)    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 (HCC)    Type 2 diabetes mellitus with hyperglycemia, with long-term current use of insulin (HCC)    Hyperparathyroidism (HCC)    Epigastric pain    Wound of lower extremity    Non healing left heel wound    Hypertensive urgency    Hypokalemia    PAD (peripheral artery disease) (HCC)    Generalized edema due to fluid overload    Acute on chronic anemia    Diabetic ulcer of heel associated with diabetes mellitus due to underlying condition (HCC)    HIT (heparin-induced thrombocytopenia)    CVA (cerebral vascular accident) ([address] )    Febrile    Diarrhea    Mild protein-calorie malnutrition ([address] )               01/11/23 1424   PT Last Visit   PT Visit Date 01/11/23   Note Type   Note Type Treatment   Pain Assessment   Pain Score No Pain   Restrictions/Precautions   RLE Weight Bearing Per Order NWB   LLE Weight Bearing Per Order NWB   Other Precautions Contact/isolation; Chair Alarm; Bed Alarm; Fall Risk   General   Chart Reviewed Yes   Response to Previous Treatment Patient with no complaints from previous session  Family/Caregiver Present No   Cognition   Overall Cognitive Status Impaired   Arousal/Participation Alert; Cooperative   Attention Attends with cues to redirect   Orientation Level Oriented to person;Oriented to place   Following Commands Follows one step commands without difficulty   Subjective   Subjective Pt agreeable to PT/OT tx  Bed Mobility   Rolling R 4  Minimal assistance   Additional items Assist x 2;Bedrails; Increased time required;Verbal cues;LE management   Rolling L 4  Minimal assistance   Additional items Assist x 2; Increased time required;Verbal cues;LE management; Bedrails   Supine to Sit 2  Maximal assistance   Additional items Assist x 2; Increased time required;Verbal cues;HOB elevated; Bedrails;LE management   Sit to Supine 2  Maximal assistance   Additional items Assist x 2; Increased time required;Verbal cues;LE management   Additional Comments cues for techniques & safety   Transfers   Sit to Stand Unable to assess   Stand to Sit Unable to assess   Additional Comments not appropriate; will recommend andree lift for OOB transfer   Ambulation/Elevation   Gait pattern Not appropriate   Ambulation/Elevation Additional Comments andree lift for OOB   Balance   Static Sitting Poor +   Dynamic Sitting Poor   Static Standing Zero   Endurance Deficit   Endurance Deficit Yes   Endurance Deficit Description fatigue; weakness   Activity Tolerance   Activity Tolerance Patient limited by fatigue;Treatment limited secondary to medical complications (Comment)   Medical Staff Made Aware [address]   Nurse Made Aware yes   Exercises   Glute Sets Supine;10 reps;AROM; Bilateral   Hip Flexion Supine;10 reps;AROM; Bilateral   Hip Abduction Supine;10 reps;AROM; Bilateral   Hip Adduction Supine;10 reps;AROM; Bilateral   Balance training  performed static sitting balance/tolerance activities at EOB; pt able to sit at EOB approx  12mins, require cues to correct posterior trunk lean; pt able to tolerate seated wt shifting at EOB (side to side & forward/backward); inc R lateral trunk & posterior trunk lean as pt fatigues   Assessment   Prognosis Fair   Problem List Decreased strength;Decreased endurance; Impaired balance;Decreased mobility; Decreased cognition; Impaired judgement;Decreased safety awareness; Obesity   Assessment Pt seen for PT per POC  PT goals , no goals met  Extend PT goals [id number]  Pt continue to demonstrate dec mobility, balance & endurance  Pt require minAx2 for rolling & maxAx2 for supine<>sit + cues for techniques & safety  Pt able to sit at EOB approx  12mins w/ min to maxAx1, require cues to correct posterior trunk lean  Inc R lateral trunk & posterior trunk lean as pt fatigues  Pt tolerated above mentioned thera  ex well  Pt require cues for proper exercise techniques and performance  Pt require frequent rest periods 2* to fatigue  Please see flowsheet above for objective findings & levels of assistance required for all functional tasks during this tx session  Nsg staff most recent vital signs as follows: /75 (BP Location: Right arm)   Pulse 69   Temp 97 6 °F (36 4 °C) (Temporal)   Resp 18   Ht 5' 4" (1 626 m)   Wt 75 1 kg (165 lb 9 1 oz)   SpO2 96%   BMI 28 42 kg/m²   Will continue PT per POC  At end of session, pt back in bed in stable condition, call bell & phone in reach, bed alarm activated  Fall precautions reinforced w/ good understanding  The patient's AM-PAC Basic Mobility Inpatient Short Form Raw Score is 7  A Raw score of less than or equal to 16 suggests the patient may benefit from discharge to post-acute rehabilitation services  Please also refer to the recommendation of the Physical Therapist for safe discharge planning  From PT standpoint, despite AM-PAC score, will continue to recommend inpt rehab at D/C   to follow   Nsg staff to continue to mobilized pt (OOB in chair for all meals & ambulate in room/unit) as tolerated to prevent decline in function  Nsg notified  Co-tx was necessary to complete this PT tx session for the pt's best interest given pt's medical acuity & complexity  Barriers to Discharge Inaccessible home environment;Decreased caregiver support   Barriers to Discharge Comments stairs; home alone   Goals   Patient Goals to get better   STG Expiration Date 01/25/23   Short Term Goal #1 Extend current PT goals x14 days: 1)  Pt will perform bed mobility with mod Ax2 demonstrating appropriate technique 100% of the time in order to improve function  2) Improve unsupported sitting balance to poor + to improve safety during transf  3) Improve static sitting tolerance >5 mins to improve tolerance to functional transf and ADLs  4) Increase activity tolerance to 45 minutes in order to improve endurance to functional tasks  5) PT for ongoing patient and family/caregiver education, DME needs and d/c planning in order to promote highest level of function in least restrictive environment  PT Treatment Day 20   Plan   Treatment/Interventions LE strengthening/ROM; Therapeutic exercise; Endurance training;Patient/family training;Bed mobility;Spoke to nursing;OT   Progress Slow progress, decreased activity tolerance   PT Frequency 3-5x/wk   Recommendation   PT Discharge Recommendation Post acute rehabilitation services   AM-PAC Basic Mobility Inpatient   Turning in Flat Bed Without Bedrails 2   Lying on Back to Sitting on Edge of Flat Bed Without Bedrails 1   Moving Bed to Chair 1   Standing Up From Chair Using Arms 1   Walk in Room 1   Climb 3-5 Stairs With Railing 1   Basic Mobility Inpatient Raw Score 7   Turning Head Towards Sound 3   Follow Simple Instructions 3   Low Function Basic Mobility Raw Score 13   Low Function Basic Mobility Standardized Score 20 14   Highest Level Of Mobility   [facility] Goal 2: Bed activities/Dependent transfer [address] [facility] Achieved 3: Sit at edge of bed   Education   Education Provided Home exercise program;Mobility training   Patient Reinforcement needed   End of Consult   Patient Position at End of Consult Supine;Bed/Chair alarm activated; All needs within reach   End of Consult Comments Pt in stable condition  All needs in reach  Bed alarm activated     Jock Market

## 2023-01-11 NOTE — PLAN OF CARE
Problem: PHYSICAL THERAPY ADULT  Goal: Performs mobility at highest level of function for planned discharge setting  See evaluation for individualized goals  Description: Treatment/Interventions: Functional transfer training, LE strengthening/ROM, Therapeutic exercise, Endurance training, Patient/family training, Equipment eval/education, Bed mobility, Compensatory technique education, Gait training, Continued evaluation, Spoke to nursing, Spoke to MD, Spoke to case management, OT          See flowsheet documentation for full assessment, interventions and recommendations  Note: Prognosis: Fair  Problem List: Decreased strength, Decreased endurance, Impaired balance, Decreased mobility, Decreased cognition, Impaired judgement, Decreased safety awareness, Obesity  Assessment: Pt seen for PT per POC  PT goals , no goals met  Extend PT goals [id number]  Pt continue to demonstrate dec mobility, balance & endurance  Pt require minAx2 for rolling & maxAx2 for supine<>sit + cues for techniques & safety  Pt able to sit at EOB approx  12mins w/ min to maxAx1, require cues to correct posterior trunk lean  Inc R lateral trunk & posterior trunk lean as pt fatigues  Pt tolerated above mentioned thera  ex well  Pt require cues for proper exercise techniques and performance  Pt require frequent rest periods 2* to fatigue  Please see flowsheet above for objective findings & levels of assistance required for all functional tasks during this tx session  Nsg staff most recent vital signs as follows: /75 (BP Location: Right arm)   Pulse 69   Temp 97 6 °F (36 4 °C) (Temporal)   Resp 18   Ht 5' 4" (1 626 m)   Wt 75 1 kg (165 lb 9 1 oz)   SpO2 96%   BMI 28 42 kg/m²   Will continue PT per POC  At end of session, pt back in bed in stable condition, call bell & phone in reach, bed alarm activated  Fall precautions reinforced w/ good understanding  The patient's AM-PAC Basic Mobility Inpatient Short Form Raw Score is 7   A Raw score of less than or equal to 16 suggests the patient may benefit from discharge to post-acute rehabilitation services  Please also refer to the recommendation of the Physical Therapist for safe discharge planning  From PT standpoint, despite AM-PAC score, will continue to recommend inpt rehab at D/C  CM to follow  Nsg staff to continue to mobilized pt (OOB in chair for all meals & ambulate in room/unit) as tolerated to prevent decline in function  Nsg notified  Co-tx was necessary to complete this PT tx session for the pt's best interest given pt's medical acuity & complexity  Barriers to Discharge: Inaccessible home environment, Decreased caregiver support  Barriers to Discharge Comments: stairs; home alone  PT Discharge Recommendation: Post acute rehabilitation services    See flowsheet documentation for full assessment

## 2023-01-11 NOTE — ASSESSMENT & PLAN NOTE
65year old female former smoker w/HTN, HLD, uncontrolled type II DM (A1c 9 8), hx CVA, presenting with nonhealing extensive L heel ulceration and R hallux and 5th digit ulceration  She underwent multiple endovascular interventions and surgeries  She is now S/P bilateral AKA's  S/p multiple b/l endovascular interventions  JUSTIN  R hemispheric CVA    S/p L AKA 11/23/22 [facility])  S/p R AKA, wound debridement 12/1/22 [facility])  S/p R AKA wound/stump washout and VAC placement 12/16/22 [facility])  S/p R AKA wound/stump VAC change, L AKA stump washout/packing 12/19/22 ([facility])  Wound Cx: enterobacter cloacae     Recommendations:  - Bilateral tissue loss in the setting of uncontrolled type II DM and NYDIA on admission, now s/p bilateral AKA's as above followed by wound debridements  - Endovascular interventions, c/b atheroembolic event to the RLE and JUSTIN with R hemispheric CVA  - Local wound care to bilateral AKA's daily    • R wounds cleanse with saline; apply wet to dry dressing and cover with ABD, Rachid and ABD    • R medial thigh apply moisturizer to skin    • L AKA cleanse with saline; peroxide and leave to open air   - Appreciate ID for assistance with abx- L stump cultures growing out enterobacter cloacae  Patient completed doxycycline on 1/2/23  - Continue statin, Plavix and warfarin  - Medical management and glucose control per SLIM  - Pain management  - D/w case management, rehab/facility requesting psych and neuropsych consults for clearance prior to acceptance  Both have been obtained and patient is cleared      - Disposition planning for long-term care placement; CM is working on placement, appreciate assistance

## 2023-01-12 VITALS
BODY MASS INDEX: 24.84 KG/M2 | RESPIRATION RATE: 18 BRPM | TEMPERATURE: 97.8 F | HEART RATE: 71 BPM | SYSTOLIC BLOOD PRESSURE: 155 MMHG | WEIGHT: 145.5 LBS | DIASTOLIC BLOOD PRESSURE: 61 MMHG | HEIGHT: 64 IN | OXYGEN SATURATION: 95 %

## 2023-01-12 LAB
GLUCOSE SERPL-MCNC: 115 MG/DL (ref 65–140)
GLUCOSE SERPL-MCNC: 159 MG/DL (ref 65–140)

## 2023-01-12 PROCEDURE — 82948 REAGENT STRIP/BLOOD GLUCOSE: CPT

## 2023-01-12 PROCEDURE — 99024 POSTOP FOLLOW-UP VISIT: CPT | Performed by: PHYSICIAN ASSISTANT

## 2023-01-12 RX ORDER — CLOPIDOGREL BISULFATE 75 MG/1
75 TABLET ORAL DAILY
Qty: 30 TABLET | Refills: 0 | Status: SHIPPED
Start: 2023-01-12 | End: 2023-03-20 | Stop reason: ALTCHOICE

## 2023-01-12 RX ORDER — QUETIAPINE FUMARATE 25 MG/1
25 TABLET, FILM COATED ORAL
Qty: 30 TABLET | Refills: 0 | Status: SHIPPED
Start: 2023-01-12

## 2023-01-12 RX ORDER — BUPROPION HYDROCHLORIDE 150 MG/1
150 TABLET ORAL DAILY
Qty: 30 TABLET | Refills: 0 | Status: SHIPPED
Start: 2023-01-12

## 2023-01-12 RX ORDER — METHOCARBAMOL 1000 MG/1
1000 TABLET, COATED ORAL EVERY 8 HOURS SCHEDULED
Qty: 30 TABLET | Refills: 0 | Status: SHIPPED
Start: 2023-01-12 | End: 2023-06-01 | Stop reason: ALTCHOICE

## 2023-01-12 RX ORDER — WARFARIN SODIUM 5 MG/1
TABLET ORAL
Qty: 30 TABLET | Refills: 0 | Status: SHIPPED
Start: 2023-01-12 | End: 2023-02-03

## 2023-01-12 RX ORDER — OXYCODONE HCL 10 MG/1
10 TABLET, FILM COATED, EXTENDED RELEASE ORAL EVERY 12 HOURS SCHEDULED
Qty: 20 TABLET | Refills: 0 | Status: SHIPPED | OUTPATIENT
Start: 2023-01-12 | End: 2023-02-03

## 2023-01-12 RX ORDER — DOXAZOSIN MESYLATE 4 MG/1
4 TABLET ORAL
Qty: 30 TABLET | Refills: 0 | Status: SHIPPED
Start: 2023-01-12 | End: 2023-02-03

## 2023-01-12 RX ADMIN — METOPROLOL SUCCINATE 100 MG: 100 TABLET, FILM COATED, EXTENDED RELEASE ORAL at 08:58

## 2023-01-12 RX ADMIN — FERROUS SULFATE TAB 325 MG (65 MG ELEMENTAL FE) 325 MG: 325 (65 FE) TAB at 11:22

## 2023-01-12 RX ADMIN — OXYCODONE HYDROCHLORIDE 10 MG: 10 TABLET, FILM COATED, EXTENDED RELEASE ORAL at 08:58

## 2023-01-12 RX ADMIN — LISINOPRIL 40 MG: 20 TABLET ORAL at 08:55

## 2023-01-12 RX ADMIN — AMLODIPINE BESYLATE 10 MG: 10 TABLET ORAL at 08:55

## 2023-01-12 RX ADMIN — CLOPIDOGREL BISULFATE 75 MG: 75 TABLET ORAL at 08:55

## 2023-01-12 RX ADMIN — INSULIN LISPRO 1 UNITS: 100 INJECTION, SOLUTION INTRAVENOUS; SUBCUTANEOUS at 11:22

## 2023-01-12 RX ADMIN — METHOCARBAMOL 1000 MG: 500 TABLET ORAL at 05:19

## 2023-01-12 RX ADMIN — POTASSIUM CHLORIDE 20 MEQ: 1500 TABLET, EXTENDED RELEASE ORAL at 08:55

## 2023-01-12 RX ADMIN — SENNOSIDES 8.6 MG: 8.6 TABLET, FILM COATED ORAL at 08:58

## 2023-01-12 RX ADMIN — PANTOPRAZOLE SODIUM 40 MG: 40 TABLET, DELAYED RELEASE ORAL at 05:19

## 2023-01-12 RX ADMIN — SERTRALINE HYDROCHLORIDE 175 MG: 100 TABLET ORAL at 08:55

## 2023-01-12 RX ADMIN — GABAPENTIN 400 MG: 400 CAPSULE ORAL at 08:55

## 2023-01-12 RX ADMIN — BUPROPION HYDROCHLORIDE 150 MG: 150 TABLET, FILM COATED, EXTENDED RELEASE ORAL at 08:54

## 2023-01-12 NOTE — CASE MANAGEMENT
Case Management Discharge Planning Note    Patient name Adrienne Mendoza  Location [address] [address] [address] [facility]-* MRN [id number]  : 1960 Date 2023       Current Admission Date: 10/21/2022  Current Admission Diagnosis:PAD (peripheral artery disease) [facility])   Patient Active Problem List    Diagnosis Date Noted   • Mild protein-calorie malnutrition ([address] ) 2022   • Diarrhea 2022   • CVA (cerebral vascular accident) ([address] ) 2022   • Febrile 2022   • HIT (heparin-induced thrombocytopenia) 2022   • Diabetic ulcer of heel associated with diabetes mellitus due to underlying condition ([address] ) 11/10/2022   • Acute on chronic anemia 10/30/2022   • Generalized edema due to fluid overload 10/27/2022   • PAD (peripheral artery disease) ([address] ) 10/25/2022   • Non healing left heel wound 10/22/2022   • Hypertensive urgency 10/22/2022   • Hypokalemia 10/22/2022   • Wound of lower extremity 10/21/2022   • Epigastric pain 2022   • Type 2 diabetes mellitus with hyperglycemia, with long-term current use of insulin ([address] ) 10/18/2021   • Hyperparathyroidism ([address] ) 10/18/2021   • 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 ([address] ) 2021   • 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   • Moderate protein-calorie malnutrition ([address] ) 2020   • Acute colitis 2020   • Arthritis 2019   • Depression, recurrent ([address] ) 2018   • Class 3 severe obesity due to excess calories with serious comorbidity and body mass index (BMI) of 40 0 to 44 9 in adult [facility]) 2018   • Esophageal reflux 2018   • Uncontrolled type 2 diabetes with neuropathy 2018   • Diabetic peripheral neuropathy ([address] ) 5315   • Systolic murmur    • Stroke ([facility] ) 2015   • Anxiety 2015   • Vitamin D deficiency 2015   • Benign essential hypertension 2012   • Familial combined hyperlipidemia 2012      LOS (days): 82  Geometric Mean LOS (GMLOS) (days):   Days to GMLOS:     OBJECTIVE:  Risk of Unplanned Readmission Score: 30 61         Current admission status: Inpatient   Preferred Pharmacy:   [id number], [address] [place] [address] [address]  [address] [place] [address]  [address] [facility]  Phone: [phone] Fax: [phone]    Primary Care Provider: ALEX Medrano    Primary Insurance: BLUE CROSS  Secondary Insurance: [place] [employer]    DISCHARGE DETAILS:    Transport at Discharge : [facility] Ambulance            ETA of Transport (Date): 01/12/23  ETA of Transport (Time): 1030     Transfer Mode: [address] [place] [address] Name, [address] : [address] [facility] [address]   [facility], [address] [facility]  Receiving Facility/Agency Phone Number: Phone: [phone]  Facility/Agency Fax Number: Fax: [phone]

## 2023-01-12 NOTE — NURSING NOTE
Patient has been dicharged to [facility] via BLS  Report called to Steve Landeros RN  Belongings reviewed and returned  Discharge instructions and rx sent with BLS  Rx also sent to pharmacy

## 2023-01-12 NOTE — PLAN OF CARE
Problem: DISCHARGE PLANNING  Goal: Discharge to home or other facility with appropriate resources  Description: INTERVENTIONS:  - Identify barriers to discharge w/patient   - Arrange for needed discharge resources and transportation as appropriate  - Identify discharge learning needs  - Refer to Case Management Department for coordinating discharge planning if the patient needs post-hospital services based on physician/advanced practitioner order   Outcome: Adequate for Discharge

## 2023-01-13 ENCOUNTER — VBI (OUTPATIENT)
Dept: ADMINISTRATIVE | Facility: OTHER | Age: 63
End: 2023-01-13

## 2023-01-13 ENCOUNTER — NURSING HOME VISIT (OUTPATIENT)
Dept: GERIATRICS | Facility: OTHER | Age: 63
End: 2023-01-13

## 2023-01-13 ENCOUNTER — TELEPHONE (OUTPATIENT)
Dept: OTHER | Facility: OTHER | Age: 63
End: 2023-01-13

## 2023-01-13 DIAGNOSIS — D75.829 HIT (HEPARIN-INDUCED THROMBOCYTOPENIA): ICD-10-CM

## 2023-01-13 DIAGNOSIS — E11.42 DIABETIC PERIPHERAL NEUROPATHY (HCC): ICD-10-CM

## 2023-01-13 DIAGNOSIS — I63.9 CEREBROVASCULAR ACCIDENT (CVA), UNSPECIFIED MECHANISM (HCC): ICD-10-CM

## 2023-01-13 DIAGNOSIS — K21.9 GASTROESOPHAGEAL REFLUX DISEASE WITHOUT ESOPHAGITIS: ICD-10-CM

## 2023-01-13 DIAGNOSIS — I10 BENIGN ESSENTIAL HYPERTENSION: ICD-10-CM

## 2023-01-13 DIAGNOSIS — F33.9 DEPRESSION, RECURRENT (HCC): ICD-10-CM

## 2023-01-13 DIAGNOSIS — E11.51 DM (DIABETES MELLITUS) TYPE II, CONTROLLED, WITH PERIPHERAL VASCULAR DISORDER (HCC): ICD-10-CM

## 2023-01-13 DIAGNOSIS — F41.9 ANXIETY: ICD-10-CM

## 2023-01-13 DIAGNOSIS — I73.9 PAD (PERIPHERAL ARTERY DISEASE) (HCC): Primary | ICD-10-CM

## 2023-01-13 NOTE — TELEPHONE ENCOUNTER
Yoana Gilbert, nurse from [facility] calling because she needs Coumadin orders for patient  TC sent to on-call provider; verified read receipt

## 2023-01-14 ENCOUNTER — TELEPHONE (OUTPATIENT)
Dept: OTHER | Facility: OTHER | Age: 63
End: 2023-01-14

## 2023-01-14 PROBLEM — E11.51 DM (DIABETES MELLITUS) TYPE II, CONTROLLED, WITH PERIPHERAL VASCULAR DISORDER (HCC): Status: ACTIVE | Noted: 2018-02-23

## 2023-01-14 NOTE — ASSESSMENT & PLAN NOTE
S/p b/l aka  Cont coumadin 5 mg 1 tab po daily (goal INR 2-3)  Cont atorvastatin 40 mg 1 tab po daily  Cont clopidogrel 75 mg 1 tab po daily

## 2023-01-14 NOTE — ASSESSMENT & PLAN NOTE
Lab Results   Component Value Date    HGBA1C 9 8 (H) 10/25/2022   s/p B/l aka  Pt on oxycontin 10 m 1 tab po BID  Pt on methocarbamol 1000 mg 1 tab po q8  Cont gabapentin 200 mg 1 tab po BID

## 2023-01-14 NOTE — ASSESSMENT & PLAN NOTE
Goal BP <130/80  Cont doxazosin 4 mg 1 tab po QHS  Cont lisinopril 40 mg 1 tab po daily  Cont metoprolol (toprol XL) 25 mg 1 tab po daily  Cont amlodipine 10 mg 1 tab po daily

## 2023-01-14 NOTE — ASSESSMENT & PLAN NOTE
Lab Results   Component Value Date    HGBA1C 9 8 (H) 10/25/2022   Cont Lantus 60 units SQ QHS  Cont Novolog but as 10 units SQ TID with meals with hold parameters  Cont trulicity 3 69 mcg qweekly

## 2023-01-14 NOTE — ASSESSMENT & PLAN NOTE
Stable  Pt already eval by psych as inpatient  Pt happy to be here  Cont zoloft 100 g 1 tab po QHS  Cont wellbutrin  mg 1 tab po daily  Cont seroquel 25 mg 1 tab po QHS

## 2023-01-14 NOTE — ASSESSMENT & PLAN NOTE
INR 2 34 on 1/11  Goal INR 2-3  Cont coumadin 5 mg 1 tab po daily  Cont plavix 75 mg 1 tab po daily  Cont atorvastatin 40 mg 1 tab po daily  Cont good BP control  Goal SBP <130/80

## 2023-01-14 NOTE — PROGRESS NOTES
[facility]  [address] [place] [address], [address] [place] [address] Admission    NAME: Hannah Steiner  AGE: 65 y o  SEX: female [id number]    DATE OF ENCOUNTER: 1/12/2023    Assessment/Plan:   Patient’s care was coordinated with nursing facility staff  Recent vitals, labs and updated medications were reviewed on [facility]  Past Medical, surgical, social, medication and allergy history and patient’s previous records reviewed  1  PAD (peripheral artery disease) ([facility] )        2  Cerebrovascular accident (CVA), unspecified mechanism ([place] [address] )        3  Benign essential hypertension        4  DM (diabetes mellitus) type II, controlled, with peripheral vascular disorder ([place] [address] )        5  Gastroesophageal reflux disease without esophagitis        6  HIT (heparin-induced thrombocytopenia)        7  Depression, recurrent ([place] [address] )        8  Diabetic peripheral neuropathy ([facility] )        9   Anxiety             PAD (peripheral artery disease) ([facility])  S/p b/l aka  Cont coumadin 5 mg 1 tab po daily (goal INR 2-3)  Cont atorvastatin 40 mg 1 tab po daily  Cont clopidogrel 75 mg 1 tab po daily    CVA (cerebral vascular accident) ([facility] )  INR 2 34 on 1/11  Goal INR 2-3  Cont coumadin 5 mg 1 tab po daily  Cont plavix 75 mg 1 tab po daily  Cont atorvastatin 40 mg 1 tab po daily  Cont good BP control  Goal SBP <130/80    Benign essential hypertension  Goal BP <130/80  Cont doxazosin 4 mg 1 tab po QHS  Cont lisinopril 40 mg 1 tab po daily  Cont metoprolol (toprol XL) 25 mg 1 tab po daily  Cont amlodipine 10 mg 1 tab po daily    DM (diabetes mellitus) type II, controlled, with peripheral vascular disorder (HCC)    Lab Results   Component Value Date    HGBA1C 9 8 (H) 10/25/2022   Cont Lantus 60 units SQ QHS  Cont Novolog but as 10 units SQ TID with meals with hold parameters  Cont trulicity 5 75 mcg qweekly    Esophageal reflux  Cont pantoprazole 40 mg 1 tab po daily    HIT (heparin-induced thrombocytopenia)  HIT Ab +11/11/2022  Thus heparin listed as an allergy  Pt does tolerate coumadin  Goal INR 2-3    Depression, recurrent (HCC)  Stable  Pt already eval by psych as inpatient  Pt happy to be here  Cont zoloft 100 g 1 tab po QHS  Cont wellbutrin  mg 1 tab po daily  Cont seroquel 25 mg 1 tab po QHS    Diabetic peripheral neuropathy (HCC)    Lab Results   Component Value Date    HGBA1C 9 8 (H) 10/25/2022   s/p B/l aka  Pt on oxycontin 10 m 1 tab po BID  Pt on methocarbamol 1000 mg 1 tab po q8  Cont gabapentin 200 mg 1 tab po BID    Anxiety  Stable  Pt saw psych as inpatient  Cont xanax 0 25 mg 1 tab po prn anxiety    Chief Complaint     Therapy    HPI   Patient is a 65 y o  female with PMH anxiety, depression, diabetes, GERD, Hyperlipidemia, HTN, Stroke, tobacco abuse, DMII , Vitamin D deficiency and PVD  Pt admitted to [facility] 10/22/2022-01/12/2023 with nonhealing Rt hallux, 5th digit ulceration and Lt heel ulceration  Pt underwent several endovascular surgeries including Rt stent placement in her SFA  She developed HIT during the hospital course and had a Rt CVA  She ended up having eventual b/l AKA  11/23/202 Lt AKA  12/2/2022 Rt AKA   12/16/2022 Rt AKA washout/wound vac  12/19/2022 Rt AKA wounc vac change; Lt AKA washout  Wound cultures grew enterobacter cloacae; pt completed doxycycline on 1/2/2023    Pt seen and examined today  Pt feels fine  Pt about to start lunch  Pt appears medically stable  Past Medical History:   Diagnosis Date   • Anxiety    • Depression    • Diabetes ([facility] )    • Diabetes mellitus ([facility] )    • Esophageal reflux     28 APR 2015 RESOLVED   • Hyperlipidemia    • Hypertension    • Low back pain     sciatica   • Pneumonia 2019   • Stroke ([facility] ) 12/2015    small vessel, L frontal corona radiata   Rx asa 81, Lipitor 40, risk modification   • Vitamin D deficiency        Past Surgical History:   Procedure Laterality Date   • AMPUTATION ABOVE KNEE (AKA) Right 2022    Procedure: (AKA), PSB  BKA;  Surgeon: Tila Galvez DO;  Location: [facility];  Service: Vascular   • ARTERIOGRAM Right 2022    Procedure: -Right lower extremity angiogram -Right CFA balloon angioplasty with [id number]  [employer]  -Right CFA DCB with 6x40 Bard Lutonix -Right CFA atherectomy with Jetstream and distal embolization protection with 7mm Spider FX -Right SFA/Pop angioplasty with 4x40 Chololate balloon -Right SFA/Pop DCB with 5x150 Bard Lutonix -Right SFA stent with 6x80 W W  [employer] x2 -R   • COLONOSCOPY     • IR AORTAGRAM WITH RUN-OFF  10/31/2022   • IR LOWER EXTREMITY ANGIOGRAM  11/10/2022   • IR LOWER EXTREMITY ANGIOGRAM  2022   • AZ AMPUTATION THIGH THROUGH FEMUR ANY LEVEL Left 2022    Procedure: (AKA); Surgeon: Jennifer Marte DO;  Location: [facility];  Service: Vascular   • AZ NEGATIVE PRESSURE WOUND THERAPY DME </= 50 SQ CM Bilateral 2022    Procedure: Right above knee amputation washout; vac change; Left above knee amputation debridement; vac placement;  Surgeon: Tila Galvez DO;  Location: [facility];  Service: Vascular   • WOUND DEBRIDEMENT Right 2022    Procedure: AKA STUMP WASHOUT, Left AKA Staple removal  application of wound vac to Right AKA;   Surgeon: Radha Vaz MD;  Location: [facility];  Service: Vascular       Social History     Tobacco Use   Smoking Status Former   • Types: Cigarettes   • Quit date:    • Years since quittin 0   Smokeless Tobacco Never          Family History   Problem Relation Age of Onset   • Diabetes Mother    • Lung cancer Mother    • Cancer Father    • Lung cancer Father    • Hypertension Brother    • Hypertension Family         Allergies   Allergen Reactions   • Heparin Other (See Comments)     History of HIT          Current Outpatient Medications:   •  ALPRAZolam (XANAX) 0 25 mg tablet, Take 1 tablet (0 25 mg total) by mouth daily as needed for anxiety (panic attacks), Disp: 10 tablet, Rfl: 0  •  amLODIPine (NORVASC) 10 mg tablet, TAKE 1 TABLET BY MOUTH EVERY DAY, Disp: 90 tablet, Rfl: 1  •  atorvastatin (LIPITOR) 40 mg tablet, TAKE 1 TABLET BY MOUTH EVERY DAY, Disp: 90 tablet, Rfl: 1  •  buPROPion (WELLBUTRIN XL) 150 mg 24 hr tablet, Take 1 tablet (150 mg total) by mouth daily, Disp: 30 tablet, Rfl: 0  •  Cholecalciferol (Vitamin D-3) 25 MCG (1000 UT) CAPS, Take 2,000 Units by mouth daily (Patient not taking: Reported on 10/21/2022), Disp: , Rfl:   •  clopidogrel (PLAVIX) 75 mg tablet, Take 1 tablet (75 mg total) by mouth daily, Disp: 30 tablet, Rfl: 0  •  clotrimazole-betamethasone (LOTRISONE) 1-0 05 % cream, Apply topically 2 (two) times a day, Disp: 30 g, Rfl: 0  •  Continuous Blood Gluc  (FreeStyle Noé 14 Day [place]) LITO, Use 1 Units continuous (Patient not taking: Reported on 10/21/2022), Disp: 1 each, Rfl: 0  •  Continuous Blood Gluc Sensor (FreeStyle Noé 14 Day Sensor) MISC, Use daily as directed for CGM - Change every 14 days (Patient not taking: No sig reported), Disp: 2 each, Rfl: 1  •  doxazosin (CARDURA) 4 mg tablet, Take 1 tablet (4 mg total) by mouth daily at bedtime, Disp: 30 tablet, Rfl: 0  •  Doxylamine Succinate, Sleep, (UNISOM PO), Take by mouth (Patient not taking: No sig reported), Disp: , Rfl:   •  fluocinonide (LIDEX) 0 05 % ointment, Apply topically 2 (two) times a day, Disp: 30 g, Rfl: 0  •  gabapentin (NEURONTIN) 100 mg capsule, TAKE 2 CAPSULES BY MOUTH TWICE A DAY, Disp: 360 capsule, Rfl: 0  •  glucose blood (True Metrix Blood Glucose Test) test strip, Use 1 each 4 (four) times a day (Patient not taking: No sig reported), Disp: 400 strip, Rfl: 0  •  insulin aspart (NovoLOG FlexPen) 100 UNIT/ML injection pen, Inject 20 Units under the skin 3 (three) times a day with meals, Disp: 15 mL, Rfl: 1  •  insulin glargine (Lantus SoloStar) 100 units/mL injection pen, Inject 60 Units under the skin daily at bedtime, Disp: 15 mL, Rfl: 1  •  Insulin Pen Needle (B-D UF III MINI PEN NEEDLES) 31G X 5 MM MISC, USE WITH INSULIN FOUR TIMES DAILY, Disp: 200 each, Rfl: 3  •  Lancets 28G MISC, Use 1 each 4 (four) times a day (Patient not taking: No sig reported), Disp: 400 each, Rfl: 0  •  lisinopril (ZESTRIL) 40 mg tablet, TAKE 1 TABLET BY MOUTH EVERY DAY, Disp: 90 tablet, Rfl: 1  •  methocarbamol 1000 MG TABS, Take 1,000 mg by mouth every 8 (eight) hours, Disp: 30 tablet, Rfl: 0  •  metoprolol succinate (TOPROL-XL) 25 mg 24 hr tablet, TAKE 1 TABLET (25 MG TOTAL) BY MOUTH DAILY  , Disp: 90 tablet, Rfl: 0  •  Multiple Vitamin (multivitamin) tablet, Take 1 tablet by mouth daily, Disp: , Rfl:   •  oxyCODONE (OxyCONTIN) 10 mg 12 hr tablet, Take 1 tablet (10 mg total) by mouth every 12 (twelve) hours for 10 days Max Daily Amount: 20 mg, Disp: 20 tablet, Rfl: 0  •  pantoprazole (PROTONIX) 40 mg tablet, Take 1 tablet (40 mg total) by mouth daily for 14 days, Disp: 14 tablet, Rfl: 0  •  potassium chloride (K-DUR,KLOR-CON) 10 mEq tablet, Take 2 tablets (20 mEq total) by mouth 2 (two) times a day for 2 doses, Disp: 4 tablet, Rfl: 0  •  QUEtiapine (SEROquel) 25 mg tablet, Take 1 tablet (25 mg total) by mouth daily at bedtime, Disp: 30 tablet, Rfl: 0  •  sertraline (ZOLOFT) 100 mg tablet, TAKE 1 TABLET BY MOUTH EVERY DAY, Disp: 90 tablet, Rfl: 1  •  Trulicity 1 94 PD/7 1BZ SOPN, INJECT 0 5 ML (0 75 MG TOTAL) UNDER THE SKIN ONCE A WEEK TUESDAY, Disp: 2 mL, Rfl: 1  •  warfarin (COUMADIN) 5 mg tablet, Take 5mg daily for goal INR 2-3, Disp: 30 tablet, Rfl: 0    [facility]  Vital signs were reviewed in [facility]    Review of Systems   All other systems reviewed and are negative  Physical Exam  Vitals reviewed  Constitutional:       General: She is not in acute distress  Appearance: She is not ill-appearing  HENT:      Head: Normocephalic and atraumatic     Cardiovascular:      Rate and Rhythm: Normal rate and regular rhythm  Heart sounds: Normal heart sounds  No murmur heard  Pulmonary:      Effort: No respiratory distress  Breath sounds: Normal breath sounds  No wheezing  Abdominal:      General: Bowel sounds are normal  There is no distension  Palpations: Abdomen is soft  Tenderness: There is no abdominal tenderness  Musculoskeletal:      Comments: B/L AKA; lt stump healed; no erythema; incision closed   Neurological:      Mental Status: She is alert and oriented to person, place, and time  Psychiatric:         Mood and Affect: Mood normal          Behavior: Behavior normal          Thought Content: Thought content normal          Judgment: Judgment normal        Diagnostic Data   Recent labs and imaging studies were reviewed  Code Status:    Full  Additional notes:     This note was electronically signed by Dr Radha Haji

## 2023-01-15 ENCOUNTER — TELEPHONE (OUTPATIENT)
Dept: OTHER | Facility: OTHER | Age: 63
End: 2023-01-15

## 2023-01-15 ENCOUNTER — HOSPITAL ENCOUNTER (EMERGENCY)
Facility: HOSPITAL | Age: 63
Discharge: ANOTHER HOSPITAL | End: 2023-01-15
Attending: EMERGENCY MEDICINE

## 2023-01-15 ENCOUNTER — APPOINTMENT (EMERGENCY)
Dept: CT IMAGING | Facility: HOSPITAL | Age: 63
End: 2023-01-15

## 2023-01-15 ENCOUNTER — APPOINTMENT (EMERGENCY)
Dept: RADIOLOGY | Facility: HOSPITAL | Age: 63
End: 2023-01-15

## 2023-01-15 VITALS
RESPIRATION RATE: 18 BRPM | OXYGEN SATURATION: 98 % | WEIGHT: 159 LBS | HEART RATE: 88 BPM | BODY MASS INDEX: 27.29 KG/M2 | TEMPERATURE: 97.7 F | SYSTOLIC BLOOD PRESSURE: 156 MMHG | DIASTOLIC BLOOD PRESSURE: 89 MMHG

## 2023-01-15 DIAGNOSIS — S00.83XA CONTUSION OF FACE, INITIAL ENCOUNTER: ICD-10-CM

## 2023-01-15 DIAGNOSIS — S01.81XA LACERATION OF FOREHEAD, INITIAL ENCOUNTER: ICD-10-CM
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
## 2023-01-15 LAB — GLUCOSE SERPL-MCNC: 101 MG/DL (ref 65–140)

## 2023-01-15 RX ORDER — OXYCODONE HYDROCHLORIDE 5 MG/1
5 TABLET ORAL ONCE
Status: COMPLETED | OUTPATIENT
Start: 2023-01-15 | End: 2023-01-15

## 2023-01-15 RX ORDER — LIDOCAINE HYDROCHLORIDE AND EPINEPHRINE 20; 5 MG/ML; UG/ML
10 INJECTION, SOLUTION EPIDURAL; INFILTRATION; INTRACAUDAL; PERINEURAL ONCE
Status: COMPLETED | OUTPATIENT
Start: 2023-01-15 | End: 2023-01-15

## 2023-01-15 RX ADMIN — OXYCODONE HYDROCHLORIDE 5 MG: 5 TABLET ORAL at 16:57

## 2023-01-15 RX ADMIN — LIDOCAINE HYDROCHLORIDE,EPINEPHRINE BITARTRATE 10 ML: 20; .005 INJECTION, SOLUTION EPIDURAL; INFILTRATION; INTRACAUDAL; PERINEURAL at 15:17

## 2023-01-15 NOTE — ED NOTES
Received telephone report from Narayan Martinez, RN manager at [facility]  Pt fell face first out of wheelchair as she was attempting to reposition herself  Pt has bilateral AKA  Pt c/o right-sided facial swelling and v-shaped lac on face  Bleeding observed in dressing of right AKA  Pt prescribed coumadin 5mg for CVA and post-op AKA - is allergic to heparin  INR this morning was 1 6 - was to have 6mg coumadin this evening         Lamont Narvaez  01/15/23 0117

## 2023-01-15 NOTE — DISCHARGE INSTRUCTIONS
Continue to take home medications for discomfort, ice your face and your leg  You can bathe, do not scrub over the cut and let it air dry  Return to the ER or follow up with the family doctor in 7-10 days for re-evaluation of the cut and to determine if the stitches can be removed

## 2023-01-15 NOTE — ED PROVIDER NOTES
History  Chief Complaint   Patient presents with   • Fall     Per EMS pt was wheelchair bound what trying to adjust self and feel forward face first laceration forehead swelling under left eye and bleeding to right site of BKA, pt + for thinners     66 YO female presents after a mechanical fall from wheelchair  Patient has Hx of B/L AKA, states she was in her chair, leaning forward when she lost her balance and fell forward  She struck her face, denies loss of consciousness  Patient does take warfarin, had her level checked this morning prior to the fall  Patient notes bleeding from AKA incision site on the Right leg  She has no other injuries  Pt denies CP/SOB/F/C/N/V/D/C, no dysuria, burning on urination or blood in urine  History provided by:  Patient and medical records   used: No    Fall  Associated symptoms: no abdominal pain, no chest pain and no vomiting        Prior to Admission Medications   Prescriptions Last Dose Informant Patient Reported? Taking?    ALPRAZolam (XANAX) 0 25 mg tablet   No No   Sig: Take 1 tablet (0 25 mg total) by mouth daily as needed for anxiety (panic attacks)   Cholecalciferol (Vitamin D-3) 25 MCG (1000 UT) CAPS   Yes No   Sig: Take 2,000 Units by mouth daily   Patient not taking: Reported on 10/21/2022   Continuous Blood Gluc  (FreeStyle Noé 14 Day [place]) Dion Humberto   No No   Sig: Use 1 Units continuous   Patient not taking: Reported on 10/21/2022   Continuous Blood Gluc Sensor (FreeStyle Noé 14 Day Sensor) MISC   No No   Sig: Use daily as directed for CGM - Change every 14 days   Patient not taking: No sig reported   Doxylamine Succinate, Sleep, (UNISOM PO)   Yes No   Sig: Take by mouth   Patient not taking: No sig reported   Insulin Pen Needle (B-D UF III MINI PEN NEEDLES) 31G X 5 MM MISC   No No   Sig: USE WITH INSULIN FOUR TIMES DAILY   Lancets 28G MISC   No No   Sig: Use 1 each 4 (four) times a day   Patient not taking: No sig reported Multiple Vitamin (multivitamin) tablet   Yes No   Sig: Take 1 tablet by mouth daily   QUEtiapine (SEROquel) 25 mg tablet   No No   Sig: Take 1 tablet (25 mg total) by mouth daily at bedtime   Trulicity 1 70 DD/1 2FW SOPN   No No   Sig: INJECT 0 5 ML (0 75 MG TOTAL) UNDER THE SKIN ONCE A WEEK TUESDAY   amLODIPine (NORVASC) 10 mg tablet   No No   Sig: TAKE 1 TABLET BY MOUTH EVERY DAY   atorvastatin (LIPITOR) 40 mg tablet   No No   Sig: TAKE 1 TABLET BY MOUTH EVERY DAY   buPROPion (WELLBUTRIN XL) 150 mg 24 hr tablet   No No   Sig: Take 1 tablet (150 mg total) by mouth daily   clopidogrel (PLAVIX) 75 mg tablet   No No   Sig: Take 1 tablet (75 mg total) by mouth daily   clotrimazole-betamethasone (LOTRISONE) 1-0 05 % cream   No No   Sig: Apply topically 2 (two) times a day   doxazosin (CARDURA) 4 mg tablet   No No   Sig: Take 1 tablet (4 mg total) by mouth daily at bedtime   fluocinonide (LIDEX) 0 05 % ointment   No No   Sig: Apply topically 2 (two) times a day   gabapentin (NEURONTIN) 100 mg capsule   No No   Sig: TAKE 2 CAPSULES BY MOUTH TWICE A DAY   glucose blood (True Metrix Blood Glucose Test) test strip   No No   Sig: Use 1 each 4 (four) times a day   Patient not taking: No sig reported   insulin aspart (NovoLOG FlexPen) 100 UNIT/ML injection pen   No No   Sig: Inject 20 Units under the skin 3 (three) times a day with meals   insulin glargine (Lantus SoloStar) 100 units/mL injection pen   No No   Sig: Inject 60 Units under the skin daily at bedtime   lisinopril (ZESTRIL) 40 mg tablet   No No   Sig: TAKE 1 TABLET BY MOUTH EVERY DAY   methocarbamol 1000 MG TABS   No No   Sig: Take 1,000 mg by mouth every 8 (eight) hours   metoprolol succinate (TOPROL-XL) 25 mg 24 hr tablet   No No   Sig: TAKE 1 TABLET (25 MG TOTAL) BY MOUTH DAILY     oxyCODONE (OxyCONTIN) 10 mg 12 hr tablet   No No   Sig: Take 1 tablet (10 mg total) by mouth every 12 (twelve) hours for 10 days Max Daily Amount: 20 mg   pantoprazole (PROTONIX) 40 mg tablet   No No   Sig: Take 1 tablet (40 mg total) by mouth daily for 14 days   potassium chloride (K-DUR,KLOR-CON) 10 mEq tablet   No No   Sig: Take 2 tablets (20 mEq total) by mouth 2 (two) times a day for 2 doses   sertraline (ZOLOFT) 100 mg tablet   No No   Sig: TAKE 1 TABLET BY MOUTH EVERY DAY   warfarin (COUMADIN) 5 mg tablet   No No   Sig: Take 5mg daily for goal INR 2-3      Facility-Administered Medications: None       Past Medical History:   Diagnosis Date   • Anxiety    • Depression    • Diabetes ([facility] [address] )    • Diabetes mellitus ([facility] )    • Esophageal reflux     28 APR 2015 RESOLVED   • Hyperlipidemia    • Hypertension    • Low back pain     sciatica   • Pneumonia 2019   • Stroke ([facility] ) 12/2015    small vessel, L frontal corona radiata  Rx asa 81, Lipitor 40, risk modification   • Vitamin D deficiency        Past Surgical History:   Procedure Laterality Date   • AMPUTATION ABOVE KNEE (AKA) Right 12/1/2022    Procedure: (AKA), PSB  BKA;  Surgeon: Yumiko Saldana DO;  Location: [facility];  Service: Vascular   • ARTERIOGRAM Right 11/7/2022    Procedure: -Right lower extremity angiogram -Right CFA balloon angioplasty with [id number]  [employer]  -Right CFA DCB with 6x40 Bard Lutonix -Right CFA atherectomy with Jetstream and distal embolization protection with 7mm Spider FX -Right SFA/Pop angioplasty with 4x40 Chololate balloon -Right SFA/Pop DCB with 5x150 Bard Lutonix -Right SFA stent with 6x80 [employer] x2 -R   • COLONOSCOPY     • IR AORTAGRAM WITH RUN-OFF  10/31/2022   • IR LOWER EXTREMITY ANGIOGRAM  11/10/2022   • IR LOWER EXTREMITY ANGIOGRAM  11/7/2022   • HI AMPUTATION THIGH THROUGH FEMUR ANY LEVEL Left 11/23/2022    Procedure: (AKA);   Surgeon: Ashlie Cooper DO;  [facility];  Service: Vascular   • HI NEGATIVE PRESSURE WOUND THERAPY DME </= 50 SQ CM Bilateral 12/19/2022    Procedure: Right above knee amputation washout; vac change; Left above knee amputation debridement; vac placement;  Surgeon: DO Cece;  [facility];  Service: Vascular   • WOUND DEBRIDEMENT Right 2022    Procedure: AKA STUMP WASHOUT, Left AKA Staple removal  application of wound vac to Right AKA; Surgeon: Tulio Murray MD;  [facility];  Service: Vascular       Family History   Problem Relation Age of Onset   • Diabetes Mother    • Lung cancer Mother    • Cancer Father    • Lung cancer Father    • Hypertension Brother    • Hypertension Family      I have reviewed and agree with the history as documented  E-Cigarette/Vaping   • E-Cigarette Use Never User      E-Cigarette/Vaping Substances   • Nicotine No    • THC No    • CBD No    • Flavoring No    • Other No    • Unknown No      Social History     Tobacco Use   • Smoking status: Former     Types: Cigarettes     Quit date:      Years since quittin 0   • Smokeless tobacco: Never   Vaping Use   • Vaping Use: Never used   Substance Use Topics   • Alcohol use: Never     Comment: OCCASIONAL ALCOHOL USE IN ALL SCRIPTS   • Drug use: No       Review of Systems   Constitutional: Negative for chills, fatigue and fever  HENT: Negative for dental problem  Eyes: Negative for visual disturbance  Respiratory: Negative for shortness of breath  Cardiovascular: Negative for chest pain  Gastrointestinal: Negative for abdominal pain, diarrhea and vomiting  Genitourinary: Negative for dysuria and frequency  Musculoskeletal: Negative for arthralgias  Skin: Positive for wound  Negative for rash  Neurological: Negative for dizziness, weakness and light-headedness  Psychiatric/Behavioral: Negative for agitation, behavioral problems and confusion  All other systems reviewed and are negative  Physical Exam  Physical Exam  Vitals and nursing note reviewed  Constitutional:       Appearance: Normal appearance  HENT:      Head: Normocephalic        Comments: 2 5 cm laceration to the central forehead, bruising to the face, B/L infra-orbital and over the chin  Eyes:      Extraocular Movements: Extraocular movements intact  Conjunctiva/sclera: Conjunctivae normal    Cardiovascular:      Rate and Rhythm: Normal rate  Pulmonary:      Effort: Pulmonary effort is normal    Abdominal:      General: There is no distension  Musculoskeletal:         General: Normal range of motion  Cervical back: Normal range of motion  Skin:     Findings: No rash  Comments: Dressing applied over Right AKA incision site, blood on dressing, no active bleeding from incision site  Neurological:      General: No focal deficit present  Mental Status: She is alert  Cranial Nerves: No cranial nerve deficit     Psychiatric:         Mood and Affect: Mood normal          Vital Signs  ED Triage Vitals   Temperature Pulse Respirations Blood Pressure SpO2   01/15/23 1348 01/15/23 1348 01/15/23 1346 01/15/23 1346 01/15/23 1348   97 6 °F (36 4 °C) 94 17 (!) 179/84 97 %      Temp Source Heart Rate Source Patient Position - Orthostatic VS BP Location FiO2 (%)   01/15/23 1348 01/15/23 1348 01/15/23 1346 01/15/23 1346 --   Oral Monitor Lying Right arm       Pain Score       01/15/23 1348       8           Vitals:    01/15/23 1457 01/15/23 1527 01/15/23 1657 01/15/23 1840   BP: 122/56 136/66 139/71 156/89   Pulse: 91 83 81 88   Patient Position - Orthostatic VS: Lying Lying Lying Lying         Visual Acuity      ED Medications  Medications   lidocaine-epinephrine (XYLOCAINE-MPF/EPINEPHRINE) 2 %-1:200,000 injection 10 mL (10 mL Infiltration Given by Other 1/15/23 1517)   oxyCODONE (ROXICODONE) IR tablet 5 mg (5 mg Oral Given 1/15/23 1657)       Diagnostic Studies  Results Reviewed     Procedure Component Value Units Date/Time    Fingerstick Glucose (POCT) [[id number]]  (Normal) Collected: 01/15/23 1657    Lab Status: Final result Updated: 01/15/23 1952     POC Glucose 101 mg/dl                  XR femur 2 views RIGHT ED Interpretation by Miguel Jarrell MD (01/15 1719)   S/P surgery, no acute fracture  CT head without contrast   Final Result by [facility],  (01/15 1723)      No acute intracranial abnormality  Chronic infarct right posterior MCA division  Frontal extra cranial soft tissue swelling and left premaxillary soft tissue swelling  Workstation performed: [id number]         CT facial bones without contrast   Final Result by [facility],  (01/15 1726)      Left premaxillary and infraorbital extracranial soft tissue swelling  No facial bone fracture identified  Workstation performed: [id number]                    Procedures  Laceration repair    Date/Time: 1/15/2023 6:29 PM  Performed by: Miguel Jarrell MD  Authorized by: Miguel Jarrell MD   Consent: Verbal consent obtained  Consent given by: patient  Patient understanding: patient states understanding of the procedure being performed  Body area: head/neck  Location details: forehead  Laceration length: 2 5 cm  Tendon involvement: none  Nerve involvement: none  Anesthesia: local infiltration    Anesthesia:  Local Anesthetic: lidocaine 2% with epinephrine  Anesthetic total: 10 mL    Wound Dehiscence:  Superficial Wound Dehiscence: simple closure      Procedure Details:  Irrigation solution: saline  Irrigation method: jet lavage  Amount of cleaning: extensive  Debridement: none  Degree of undermining: none  Skin closure: 5-0 nylon  Number of sutures: 10  Technique: simple  Approximation: close  Approximation difficulty: simple               ED Course  ED Course as of 01/15/23 2318   Sun Emigdio 15, 2023   1719 X-ray(s) personally evaluated, interpretation: No acute findings  1729 Reviewed CT imaging prior to radiology reading, reviewed radiologist's interpretation, no concerns not mentioned in radiology read                                  SBIRT 20yo+    Flowsheet Row Most Recent Value   SBIRT (25 yo +)    In order to provide better care to our patients, we are screening all of our patients for alcohol and drug use  Would it be okay to ask you these screening questions? Yes Filed at: 01/15/2023 1518   Initial Alcohol Screen: US AUDIT-C     1  How often do you have a drink containing alcohol? 0 Filed at: 01/15/2023 1518   2  How many drinks containing alcohol do you have on a typical day you are drinking? 0 Filed at: 01/15/2023 1518   3b  FEMALE Any Age, or MALE 65+: How often do you have 4 or more drinks on one occassion? 0 Filed at: 01/15/2023 1518   Audit-C Score 0 Filed at: 01/15/2023 1518   DAILY: How many times in the past year have you    Used an illegal drug or used a prescription medication for non-medical reasons? Never Filed at: 01/15/2023 1518                    Medical Decision Making  1  Fall - Patient states mechanical fall today, she does take warfarin  Looking through records, today's INR noted to be 1 6  Will CT head and face, suture laceration over forehead  Will likely place Surgicel over Right AKA incision site and re-apply dressing  Contusion of face, initial encounter: acute illness or injury  Fall, initial encounter: acute illness or injury  Laceration of forehead, initial encounter: acute illness or injury  Amount and/or Complexity of Data Reviewed  Labs: ordered  Radiology: ordered and independent interpretation performed  Decision-making details documented in ED Course  Risk  Prescription drug management  Disposition  Final diagnoses:   Fall, initial encounter   Laceration of forehead, initial encounter   Contusion of face, initial encounter     Time reflects when diagnosis was documented in both MDM as applicable and the Disposition within this note     Time User Action Codes Description Comment    1/15/2023  6:31 PM Rod Foster [[id number]  [id number]] Fall, initial encounter     1/15/2023  6:31 PM Rod Foster [[id number]] Laceration of forehead, initial encounter 1/15/2023  6:31 PM Valerio, [address] Contusion of face, initial encounter       ED Disposition     ED Disposition   Discharge    Condition   Stable    Date/Time   Sun Emigdio 15, 2023  6:31 PM    Comment   Adrienne Mendoza discharge to home/self care                 Follow-up Information     Follow up With Specialties Details Why [address], [address] [facility]   [address]  [address]  [phone]            Discharge Medication List as of 1/15/2023  6:33 PM      CONTINUE these medications which have NOT CHANGED    Details   ALPRAZolam (XANAX) 0 25 mg tablet Take 1 tablet (0 25 mg total) by mouth daily as needed for anxiety (panic attacks), Starting Fri 4/15/2022, Normal      amLODIPine (NORVASC) 10 mg tablet TAKE 1 TABLET BY MOUTH EVERY DAY, Normal      atorvastatin (LIPITOR) 40 mg tablet TAKE 1 TABLET BY MOUTH EVERY DAY, Normal      buPROPion (WELLBUTRIN XL) 150 mg 24 hr tablet Take 1 tablet (150 mg total) by mouth daily, Starting Thu 1/12/2023, No Print      Cholecalciferol (Vitamin D-3) 25 MCG (1000 UT) CAPS Take 2,000 Units by mouth daily, Historical Med      clopidogrel (PLAVIX) 75 mg tablet Take 1 tablet (75 mg total) by mouth daily, Starting Thu 1/12/2023, No Print      clotrimazole-betamethasone (LOTRISONE) 1-0 05 % cream Apply topically 2 (two) times a day, Starting Wed 3/16/2022, Normal      Continuous Blood Gluc  (FreeStyle Noé 14 Day [place]) LITO Use 1 Units continuous, Starting Wed 8/25/2021, Normal      Continuous Blood Gluc Sensor (FreeStyle Noé 14 Day Sensor) MISC Use daily as directed for CGM - Change every 14 days, Normal      doxazosin (CARDURA) 4 mg tablet Take 1 tablet (4 mg total) by mouth daily at bedtime, Starting Thu 1/12/2023, No Print      Doxylamine Succinate, Sleep, (UNISOM PO) Take by mouth, Historical Med      fluocinonide (LIDEX) 0 05 % ointment Apply topically 2 (two) times a day, Starting Wed 4/27/2022, Normal gabapentin (NEURONTIN) 100 mg capsule TAKE 2 CAPSULES BY MOUTH TWICE A DAY, Normal      glucose blood (True Metrix Blood Glucose Test) test strip Use 1 each 4 (four) times a day, Starting Mon 6/14/2021, Normal      insulin aspart (NovoLOG FlexPen) 100 UNIT/ML injection pen Inject 20 Units under the skin 3 (three) times a day with meals, Starting Thu 3/31/2022, Normal      insulin glargine (Lantus SoloStar) 100 units/mL injection pen Inject 60 Units under the skin daily at bedtime, Starting Thu 3/31/2022, Normal      Insulin Pen Needle (B-D UF III MINI PEN NEEDLES) 31G X 5 MM MISC USE WITH INSULIN FOUR TIMES DAILY, Normal      Lancets 28G MISC Use 1 each 4 (four) times a day, Starting Mon 6/14/2021, Normal      lisinopril (ZESTRIL) 40 mg tablet TAKE 1 TABLET BY MOUTH EVERY DAY, Normal      methocarbamol 1000 MG TABS Take 1,000 mg by mouth every 8 (eight) hours, Starting Thu 1/12/2023, No Print      metoprolol succinate (TOPROL-XL) 25 mg 24 hr tablet TAKE 1 TABLET (25 MG TOTAL) BY MOUTH DAILY  , Starting Sun 10/2/2022, Normal      Multiple Vitamin (multivitamin) tablet Take 1 tablet by mouth daily, Historical Med      oxyCODONE (OxyCONTIN) 10 mg 12 hr tablet Take 1 tablet (10 mg total) by mouth every 12 (twelve) hours for 10 days Max Daily Amount: 20 mg, Starting Thu 1/12/2023, Until Sun 1/22/2023, Print      pantoprazole (PROTONIX) 40 mg tablet Take 1 tablet (40 mg total) by mouth daily for 14 days, Starting Mon 2/21/2022, Until Mon 3/7/2022, Normal      potassium chloride (K-DUR,KLOR-CON) 10 mEq tablet Take 2 tablets (20 mEq total) by mouth 2 (two) times a day for 2 doses, Starting Mon 2/21/2022, Until Tue 2/22/2022, Normal      QUEtiapine (SEROquel) 25 mg tablet Take 1 tablet (25 mg total) by mouth daily at bedtime, Starting Thu 1/12/2023, No Print      sertraline (ZOLOFT) 100 mg tablet TAKE 1 TABLET BY MOUTH EVERY DAY, Normal      Trulicity 3 26 GZ/6 7KV SOPN INJECT 0 5 ML (0 75 MG TOTAL) UNDER THE SKIN ONCE A WEEK TUESDAY, Starting Thu 5/5/2022, Normal      warfarin (COUMADIN) 5 mg tablet Take 5mg daily for goal INR 2-3, No Print             No discharge procedures on file      PDMP Review       Value Time User    PDMP Reviewed  Yes 1/12/2023 12:02 PM Wade Vazquez MD          ED Provider  Electronically Signed by           Miguel Jarrell MD  01/15/23 2125

## 2023-01-15 NOTE — TELEPHONE ENCOUNTER
S/w Jailyn and stated patient blood work as drawn today and stated labs didn't receive the results and needs to speak to on call to advise

## 2023-01-16 ENCOUNTER — DOCUMENTATION (OUTPATIENT)
Dept: GERIATRICS | Facility: OTHER | Age: 63
End: 2023-01-16

## 2023-01-16 ENCOUNTER — TELEPHONE (OUTPATIENT)
Dept: VASCULAR SURGERY | Facility: CLINIC | Age: 63
End: 2023-01-16

## 2023-01-16 NOTE — DISCHARGE SUMMARY
Vascular Surgery  Discharge Summary - Carolina Asencio 65 y o  female MRN: [id number]    Unit/Bed#: E4 -01 Encounter: [id number]    Admission Date:   Admission Orders (From admission, onward)     Ordered        10/22/22 0212  INPATIENT ADMISSION  Once                        Admitting Diagnosis: Hypokalemia [E87 6]  UTI (urinary tract infection) [N39 0]  OREILLY (dyspnea on exertion) [R06 09]  Hypertension [I10]  Necrotic toes (HCC) [I96]  Non healing left heel wound [S91 302A]  Open wound of foot, complicated, left, initial encounter [S91 302A]  Sepsis ([address] ) [A41 9]    HPI: 65year old female former smoker w/hx of HTN, HLD, uncontrolled type II DM, hx CVA, who presented on October 21 2022 with bilateral lower extremity tissue loss, including L heel and R 5th digit  Patient was found to have underlying PAD and vascular surgery was asked to evaluate patient regarding perfusion and healing potential      Procedures Performed:   Orders Placed This Encounter   Procedures   • ED ECG Documentation Only   • ED ECG Documentation Only   • ED ECG Documentation Only   • Midline Insertion   • Wound cleansing and dressings       Summary of Hospital Course: 65year old female who had presented to the ED on October 21 2022 for evaluation of bilateral wounds  Patient had presented with a L heel ulceration which was extensive  RLE had a gangrenous 5th digit  Patient was admitted under the medical service with podiatry and vascular surgery consults  Podiatry had initially recommended local and palliative wound care of the L heel, with possible RLE 5th digit amputation  Patient had noninvasive imaging studies which suggested monophasic waveforms of the R CFA, indicative of proximal disease  Left lower extremity noted to have diffuse disease  Pressures in the LLE were noted to be below healing, while RLE had pressures above healing  Given evidence of iliac disease on the duplex, a CTA with run off was obtained   This demonstrated R iliac disease and diffuse infrainguinal bilateral disease  It was then recommended that patient undergo abdominal aortogram with LLE run off, and possible intervention since pressures were below healing  On 10/31/2022, patient was taken to IR and underwent abdominal aortogram with left lower extremity runoff  A right common iliac artery stent was placed as well as left SFA shockwave angioplasty and Rosalinda stenting  Of note, she was noted to have calcified eccentric disease in the right common femoral artery, which was the access site  A Pro-glide Perclose closure device was used at the end of the procedure  On 11/1/2022, postintervention SHARIF/waveforms was performed per protocol  There was noted to be improvement of left lower extremity SHARIF, however with significant decrease in right lower extremity SHARIF  Her right femoral pulse remained palpable  A repeat CTA of the abdomen with runoff was obtained secondary to new finding of decreased SHARIF on the right lower extremity with elevated velocities  There was noted to be a closure device injury in the right common femoral artery  Fortunately, patient remained motor/sensory intact with only minimal R foot discomfort  Patient was recommended to undergo repair of the R CFA injury given her tissue loss on the right foot  Patient was noted to have severe groin fungal infection, therefore she was going to be at increased risk of incisional infection/dehiscence  She was taken to the OR on 11/7/22 for attempted endovascular repair of the R CFA injury  This was complicated by atheroembolization to the right lower extremity  Post operatively, patient's course was complicated by JUSTIN and a R hemispheric CVA    During this course, she was noted to have occluded her left lower extremity stents and required repeat CTA and repeat angiogram   She was noted to continue to have extensive tissue loss of the bilateral lower extremities along with left lower extremity weakness secondary to her hemispheric CVA  Upon her diagnosis of JUSTIN, she was transitioned to argatroban and began to continue to improve clinically  Unfortunately due to the extensive tissue loss, her bilateral lower extremities were deemed nonsalvageable and she required bilateral above-knee amputations  Throughout her remainder of her hospital course, she was transition to Coumadin and managed medically  Her right lower extremity above-knee amputation was noted to have some areas of breakdown at the incision line  She was then taken back to the operating room for debridement and wound VAC placement of the right lower extremity stump  Throughout the remainder of her hospital course, she continued to improve  Her stump wounds had successfully healed  And she was eventually cleared for discharge from a medical and vascular standpoint  She was recommended to go to acute rehab after discharge  She was discharged on coumadin to rehab  Significant Findings, Care, Treatment and Services Provided: As noted above    S/p L AKA 11/23/22 [facility])  S/p R AKA, wound debridement 12/1/22 [facility])  S/p R AKA wound/stump washout and VAC placement 12/16/22 [facility])  S/p R AKA wound/stump VAC change, L AKA stump washout/packing 12/19/22 [facility])    Complications: As noted above    Discharge Diagnosis: PAD, JUSTIN, CVA    Medical Problems     Resolved Problems  Date Reviewed: 1/14/2023   None         Condition at Discharge: stable         Discharge instructions/Information to patient and family:   See after visit summary for information provided to patient and family  Provisions for Follow-Up Care:  See after visit summary for information related to follow-up care and any pertinent home health orders  PCP: ALEX Herzog    Disposition: See After Visit Summary for discharge disposition information  Planned Readmission: No      Discharge Statement   I spent 25 minutes discharging the patient   This time was spent on the day of discharge  I had direct contact with the patient on the day of discharge  Additional documentation is required if more than 30 minutes were spent on discharge  Discharge Medications:  See after visit summary for reconciled discharge medications provided to patient and family

## 2023-01-16 NOTE — TELEPHONE ENCOUNTER
Claudeen Bertin called, requesting a call back from on call provider, to review pt orders   Provider paged via [facility]

## 2023-01-16 NOTE — TELEPHONE ENCOUNTER
Vascular Nurse [address] Op Call    Procedure:  (AKA) (Left)    Date of Procedure:  11/23/22    Surgeon:     Mandy Cadet, DO - Primary     * Gabe Gonzalez MD - Assisting    Procedure:  Right above knee amputation    Date of Procedure:  12/1/22    Surgeon:     James Landeros DO - Primary     * Aure Rodriguez PA-C - Assisting    Procedure:  Right AKA stump washout, debridement, application of wound vac  Debridement right lateral thigh wound and posterior thigh wound    Date of Procedure:  12/16/22    Surgeon:     Jaun Ko MD - Primary     * Eboni Reed MD - Assisting    Procedure:  Right above knee amputation washout; vac change; Left above knee amputation debridement; vac placement (Bilateral)    Date of Procedure:  12/19/22    Surgeon:     James Landeros DO - Primary     * Kyle Turner DO - Assisting    Discharge Date:  1/12/23    Discharge Disposition: [facility]    Chest Pain?:No    Shortness of Breath?:No     Increased Pain, Swelling, Warmth, or Redness? :No    Purulent Drainage?:No    Foul- smelling drainage?: No    Signs of dehiscence?:No    Fever/Chills? :No    Bleeding?:No    Anticoagulation pt was discharged on post op?: Warfarin (Coumadin or Jantoven) and Clopidogrel (Plavix)    Statin pt was discharged on post op?: Lipitor (atorvastatin)    Uncontrolled Pain?:No      Reviewed discharge instructions and incision care with patient  NEXT OFFICE VISIT SCHEDULED:  Currently not scheduled at time of call    Transportation:  Yes, facility to arrange transportation      Any further questions/concerns? Spoke with Rodney Wright, nurse at [facility], in regards to above  She stated that she and the wound nurse took a look at patient's incisions today  She stated that patient had a fall yesterday and was seen in the ER  She stated that patient fell forward and hit her head and hit her right AKA stump in some manner    She stated there is no bleeding on her stump  She stated there is a pencil eraser size bruise on the under side of her stump  She stated the yellow slough on the incision is unchanged from when she was admitted to their facility and the right thigh wound is unchanged  She stated the left AKA incision looks good and without any signs of infection - no redness, drainage, or open areas  She stated that patient is having a lot of generalized pain today due to fall, but otherwise prior to fall, patient's pain was controlled  All questions answered  Informed her that patient does not have a follow up appointment  She stated that she will put in a referral and have their scheduling department and they will contact office to schedule appointment

## 2023-01-17 ENCOUNTER — DOCUMENTATION (OUTPATIENT)
Dept: GERIATRICS | Facility: OTHER | Age: 63
End: 2023-01-17

## 2023-01-17 ENCOUNTER — APPOINTMENT (EMERGENCY)
Dept: CT IMAGING | Facility: HOSPITAL | Age: 63
End: 2023-01-17

## 2023-01-17 ENCOUNTER — HOSPITAL ENCOUNTER (INPATIENT)
Facility: HOSPITAL | Age: 63
LOS: 17 days | Discharge: NURSING, REHAB OR LONG-TERM CARE | End: 2023-02-03
Attending: EMERGENCY MEDICINE | Admitting: INTERNAL MEDICINE

## 2023-01-17 DIAGNOSIS — L08.9 WOUND INFECTION: ICD-10-CM

## 2023-01-17 DIAGNOSIS — N39.0 SEPSIS DUE TO URINARY TRACT INFECTION (HCC): ICD-10-CM

## 2023-01-17 DIAGNOSIS — Z89.611 HX OF AKA (ABOVE KNEE AMPUTATION), RIGHT (HCC): ICD-10-CM

## 2023-01-17 DIAGNOSIS — I10 PRIMARY HYPERTENSION: ICD-10-CM

## 2023-01-17 DIAGNOSIS — M86.9 OSTEOMYELITIS (HCC): ICD-10-CM

## 2023-01-17 DIAGNOSIS — E83.42 HYPOMAGNESEMIA: ICD-10-CM

## 2023-01-17 DIAGNOSIS — R50.9 FEVER: ICD-10-CM

## 2023-01-17 DIAGNOSIS — R41.82 ALTERED MENTAL STATUS: Primary | ICD-10-CM

## 2023-01-17 DIAGNOSIS — N39.0 UTI (URINARY TRACT INFECTION): ICD-10-CM

## 2023-01-17 DIAGNOSIS — A41.9 SEPSIS (HCC): ICD-10-CM

## 2023-01-17 DIAGNOSIS — I46.9 CARDIAC ARREST (HCC): ICD-10-CM

## 2023-01-17 DIAGNOSIS — T14.8XXA WOUND INFECTION: ICD-10-CM

## 2023-01-17 DIAGNOSIS — J96.01 ACUTE RESPIRATORY FAILURE WITH HYPOXIA (HCC): ICD-10-CM

## 2023-01-17 DIAGNOSIS — I73.9 PAD (PERIPHERAL ARTERY DISEASE) (HCC): ICD-10-CM

## 2023-01-17 DIAGNOSIS — S81.801S WOUND OF RIGHT LOWER EXTREMITY, SEQUELA: ICD-10-CM

## 2023-01-17 DIAGNOSIS — A41.9 SEPSIS DUE TO URINARY TRACT INFECTION (HCC): ICD-10-CM

## 2023-01-17 DIAGNOSIS — I73.9 PERIPHERAL VASCULAR DISEASE, UNSPECIFIED (HCC): ICD-10-CM

## 2023-01-17 DIAGNOSIS — E44.0 MODERATE PROTEIN-CALORIE MALNUTRITION (HCC): ICD-10-CM

## 2023-01-17 DIAGNOSIS — S01.81XD LACERATION OF FOREHEAD, SUBSEQUENT ENCOUNTER: ICD-10-CM

## 2023-01-17 DIAGNOSIS — D68.9 COAGULOPATHY (HCC): ICD-10-CM
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
## 2023-01-17 PROBLEM — G93.41 METABOLIC ENCEPHALOPATHY: Status: ACTIVE | Noted: 2023-01-17

## 2023-01-17 PROBLEM — E87.1 HYPONATREMIA: Status: ACTIVE | Noted: 2023-01-17

## 2023-01-17 LAB
2HR DELTA HS TROPONIN: 131 NG/L
4HR DELTA HS TROPONIN: 267 NG/L
ABO GROUP BLD: NORMAL
ALBUMIN SERPL BCP-MCNC: 3.2 G/DL (ref 3.5–5)
ALP SERPL-CCNC: 131 U/L (ref 34–104)
ALT SERPL W P-5'-P-CCNC: 9 U/L (ref 7–52)
ANION GAP SERPL CALCULATED.3IONS-SCNC: 12 MMOL/L (ref 4–13)
APTT PPP: 57 SECONDS (ref 23–37)
AST SERPL W P-5'-P-CCNC: 15 U/L (ref 13–39)
ATRIAL RATE: 110 BPM
ATRIAL RATE: 136 BPM
BACTERIA UR QL AUTO: ABNORMAL /HPF
BASE EX.OXY STD BLDV CALC-SCNC: 87.7 % (ref 60–80)
BASE EXCESS BLDV CALC-SCNC: -0.9 MMOL/L
BASOPHILS # BLD AUTO: 0.05 THOUSANDS/ÂΜL (ref 0–0.1)
BASOPHILS NFR BLD AUTO: 0 % (ref 0–1)
BILIRUB SERPL-MCNC: 0.37 MG/DL (ref 0.2–1)
BILIRUB UR QL STRIP: NEGATIVE
BLD GP AB SCN SERPL QL: NEGATIVE
BUN SERPL-MCNC: 20 MG/DL (ref 5–25)
CALCIUM ALBUM COR SERPL-MCNC: 9 MG/DL (ref 8.3–10.1)
CALCIUM SERPL-MCNC: 8.4 MG/DL (ref 8.4–10.2)
CARDIAC TROPONIN I PNL SERPL HS: 151 NG/L
CARDIAC TROPONIN I PNL SERPL HS: 20 NG/L
CARDIAC TROPONIN I PNL SERPL HS: 287 NG/L
CHLORIDE SERPL-SCNC: 98 MMOL/L (ref 96–108)
CLARITY UR: ABNORMAL
CO2 SERPL-SCNC: 22 MMOL/L (ref 21–32)
COLOR UR: YELLOW
CREAT SERPL-MCNC: 0.82 MG/DL (ref 0.6–1.3)
EOSINOPHIL # BLD AUTO: 0 THOUSAND/ÂΜL (ref 0–0.61)
EOSINOPHIL NFR BLD AUTO: 0 % (ref 0–6)
ERYTHROCYTE [DISTWIDTH] IN BLOOD BY AUTOMATED COUNT: 17.1 % (ref 11.6–15.1)
FLUAV RNA RESP QL NAA+PROBE: NEGATIVE
FLUBV RNA RESP QL NAA+PROBE: NEGATIVE
GFR SERPL CREATININE-BSD FRML MDRD: 77 ML/MIN/1.73SQ M
GLUCOSE SERPL-MCNC: 145 MG/DL (ref 65–140)
GLUCOSE SERPL-MCNC: 95 MG/DL (ref 65–140)
GLUCOSE UR STRIP-MCNC: NEGATIVE MG/DL
HCO3 BLDV-SCNC: 21.7 MMOL/L (ref 24–30)
HCT VFR BLD AUTO: 31.2 % (ref 34.8–46.1)
HGB BLD-MCNC: 9.7 G/DL (ref 11.5–15.4)
HGB UR QL STRIP.AUTO: ABNORMAL
IMM GRANULOCYTES # BLD AUTO: 0.14 THOUSAND/UL (ref 0–0.2)
IMM GRANULOCYTES NFR BLD AUTO: 1 % (ref 0–2)
INR PPP: 2.96 (ref 0.84–1.19)
KETONES UR STRIP-MCNC: NEGATIVE MG/DL
LACTATE SERPL-SCNC: 1.1 MMOL/L (ref 0.5–2)
LEUKOCYTE ESTERASE UR QL STRIP: ABNORMAL
LYMPHOCYTES # BLD AUTO: 0.82 THOUSANDS/ÂΜL (ref 0.6–4.47)
LYMPHOCYTES NFR BLD AUTO: 5 % (ref 14–44)
MAGNESIUM SERPL-MCNC: 1.5 MG/DL (ref 1.9–2.7)
MCH RBC QN AUTO: 25.7 PG (ref 26.8–34.3)
MCHC RBC AUTO-ENTMCNC: 31.1 G/DL (ref 31.4–37.4)
MCV RBC AUTO: 83 FL (ref 82–98)
MONOCYTES # BLD AUTO: 0.94 THOUSAND/ÂΜL (ref 0.17–1.22)
MONOCYTES NFR BLD AUTO: 6 % (ref 4–12)
MUCOUS THREADS UR QL AUTO: ABNORMAL
NEUTROPHILS # BLD AUTO: 14.53 THOUSANDS/ÂΜL (ref 1.85–7.62)
NEUTS SEG NFR BLD AUTO: 88 % (ref 43–75)
NITRITE UR QL STRIP: NEGATIVE
NON-SQ EPI CELLS URNS QL MICRO: ABNORMAL /HPF
NRBC BLD AUTO-RTO: 0 /100 WBCS
O2 CT BLDV-SCNC: 13.6 ML/DL
OTHER STN SPEC: ABNORMAL
P AXIS: 40 DEGREES
P AXIS: 78 DEGREES
PCO2 BLDV: 29.3 MM HG (ref 42–50)
PH BLDV: 7.49 [PH] (ref 7.3–7.4)
PH UR STRIP.AUTO: 6 [PH]
PLATELET # BLD AUTO: 269 THOUSANDS/UL (ref 149–390)
PMV BLD AUTO: 8.7 FL (ref 8.9–12.7)
PO2 BLDV: 54 MM HG (ref 35–45)
POTASSIUM SERPL-SCNC: 3.5 MMOL/L (ref 3.5–5.3)
PR INTERVAL: 116 MS
PR INTERVAL: 130 MS
PROCALCITONIN SERPL-MCNC: 0.27 NG/ML
PROCALCITONIN SERPL-MCNC: 1.37 NG/ML
PROT SERPL-MCNC: 7.1 G/DL (ref 6.4–8.4)
PROT UR STRIP-MCNC: ABNORMAL MG/DL
PROTHROMBIN TIME: 30.6 SECONDS (ref 11.6–14.5)
QRS AXIS: 40 DEGREES
QRS AXIS: 41 DEGREES
QRSD INTERVAL: 74 MS
QRSD INTERVAL: 80 MS
QT INTERVAL: 288 MS
QT INTERVAL: 352 MS
QTC INTERVAL: 468 MS
QTC INTERVAL: 476 MS
RBC # BLD AUTO: 3.78 MILLION/UL (ref 3.81–5.12)
RBC #/AREA URNS AUTO: ABNORMAL /HPF
RH BLD: NEGATIVE
RSV RNA RESP QL NAA+PROBE: NEGATIVE
SARS-COV-2 RNA RESP QL NAA+PROBE: NEGATIVE
SODIUM SERPL-SCNC: 132 MMOL/L (ref 135–147)
SP GR UR STRIP.AUTO: 1.02 (ref 1–1.03)
SPECIMEN EXPIRATION DATE: NORMAL
T WAVE AXIS: 66 DEGREES
T WAVE AXIS: 66 DEGREES
URINE COMMENT: ABNORMAL
UROBILINOGEN UR QL STRIP.AUTO: 0.2 E.U./DL
VENTRICULAR RATE: 110 BPM
VENTRICULAR RATE: 159 BPM
WBC # BLD AUTO: 16.48 THOUSAND/UL (ref 4.31–10.16)
WBC #/AREA URNS AUTO: ABNORMAL /HPF

## 2023-01-17 RX ORDER — ATORVASTATIN CALCIUM 40 MG/1
40 TABLET, FILM COATED ORAL
Status: DISCONTINUED | OUTPATIENT
Start: 2023-01-18 | End: 2023-01-18

## 2023-01-17 RX ORDER — CLOTRIMAZOLE AND BETAMETHASONE DIPROPIONATE 10; .64 MG/G; MG/G
CREAM TOPICAL 2 TIMES DAILY
Status: DISCONTINUED | OUTPATIENT
Start: 2023-01-17 | End: 2023-01-23

## 2023-01-17 RX ORDER — INSULIN LISPRO 100 [IU]/ML
1-5 INJECTION, SOLUTION INTRAVENOUS; SUBCUTANEOUS
Status: DISCONTINUED | OUTPATIENT
Start: 2023-01-18 | End: 2023-01-18

## 2023-01-17 RX ORDER — FLUOCINONIDE 0.5 MG/G
OINTMENT TOPICAL 2 TIMES DAILY
Status: DISCONTINUED | OUTPATIENT
Start: 2023-01-17 | End: 2023-01-23

## 2023-01-17 RX ORDER — ACETAMINOPHEN 325 MG/1
650 TABLET ORAL EVERY 6 HOURS PRN
Status: DISCONTINUED | OUTPATIENT
Start: 2023-01-17 | End: 2023-01-21

## 2023-01-17 RX ORDER — ACETAMINOPHEN 650 MG/1
650 SUPPOSITORY RECTAL ONCE
Status: COMPLETED | OUTPATIENT
Start: 2023-01-17 | End: 2023-01-17

## 2023-01-17 RX ORDER — SERTRALINE HYDROCHLORIDE 100 MG/1
100 TABLET, FILM COATED ORAL DAILY
Status: DISCONTINUED | OUTPATIENT
Start: 2023-01-18 | End: 2023-02-03 | Stop reason: HOSPADM

## 2023-01-17 RX ORDER — MAGNESIUM SULFATE HEPTAHYDRATE 40 MG/ML
2 INJECTION, SOLUTION INTRAVENOUS ONCE
Status: COMPLETED | OUTPATIENT
Start: 2023-01-17 | End: 2023-01-17

## 2023-01-17 RX ORDER — METOPROLOL SUCCINATE 25 MG/1
25 TABLET, EXTENDED RELEASE ORAL DAILY
Status: DISCONTINUED | OUTPATIENT
Start: 2023-01-18 | End: 2023-01-18

## 2023-01-17 RX ORDER — BUPROPION HYDROCHLORIDE 150 MG/1
150 TABLET ORAL DAILY
Status: DISCONTINUED | OUTPATIENT
Start: 2023-01-18 | End: 2023-01-23

## 2023-01-17 RX ORDER — KETOROLAC TROMETHAMINE 30 MG/ML
15 INJECTION, SOLUTION INTRAMUSCULAR; INTRAVENOUS ONCE
Status: COMPLETED | OUTPATIENT
Start: 2023-01-17 | End: 2023-01-17

## 2023-01-17 RX ORDER — POTASSIUM CHLORIDE 20 MEQ/1
20 TABLET, EXTENDED RELEASE ORAL 2 TIMES DAILY
Status: DISCONTINUED | OUTPATIENT
Start: 2023-01-17 | End: 2023-01-18

## 2023-01-17 RX ORDER — SODIUM CHLORIDE, SODIUM GLUCONATE, SODIUM ACETATE, POTASSIUM CHLORIDE, MAGNESIUM CHLORIDE, SODIUM PHOSPHATE, DIBASIC, AND POTASSIUM PHOSPHATE .53; .5; .37; .037; .03; .012; .00082 G/100ML; G/100ML; G/100ML; G/100ML; G/100ML; G/100ML; G/100ML
100 INJECTION, SOLUTION INTRAVENOUS CONTINUOUS
Status: DISCONTINUED | OUTPATIENT
Start: 2023-01-17 | End: 2023-01-19

## 2023-01-17 RX ORDER — SODIUM CHLORIDE, SODIUM GLUCONATE, SODIUM ACETATE, POTASSIUM CHLORIDE, MAGNESIUM CHLORIDE, SODIUM PHOSPHATE, DIBASIC, AND POTASSIUM PHOSPHATE .53; .5; .37; .037; .03; .012; .00082 G/100ML; G/100ML; G/100ML; G/100ML; G/100ML; G/100ML; G/100ML
100 INJECTION, SOLUTION INTRAVENOUS CONTINUOUS
Status: DISCONTINUED | OUTPATIENT
Start: 2023-01-17 | End: 2023-01-17

## 2023-01-17 RX ORDER — PANTOPRAZOLE SODIUM 40 MG/1
40 TABLET, DELAYED RELEASE ORAL
Status: DISCONTINUED | OUTPATIENT
Start: 2023-01-18 | End: 2023-01-18

## 2023-01-17 RX ORDER — INSULIN LISPRO 100 [IU]/ML
1-5 INJECTION, SOLUTION INTRAVENOUS; SUBCUTANEOUS
Status: DISCONTINUED | OUTPATIENT
Start: 2023-01-17 | End: 2023-01-18

## 2023-01-17 RX ORDER — CLOPIDOGREL BISULFATE 75 MG/1
75 TABLET ORAL DAILY
Status: DISCONTINUED | OUTPATIENT
Start: 2023-01-18 | End: 2023-02-03 | Stop reason: HOSPADM

## 2023-01-17 RX ORDER — DOXAZOSIN MESYLATE 4 MG/1
4 TABLET ORAL
Status: DISCONTINUED | OUTPATIENT
Start: 2023-01-17 | End: 2023-01-18

## 2023-01-17 RX ORDER — WARFARIN SODIUM 5 MG/1
5 TABLET ORAL
Status: DISCONTINUED | OUTPATIENT
Start: 2023-01-18 | End: 2023-01-18

## 2023-01-17 RX ORDER — SODIUM CHLORIDE, SODIUM GLUCONATE, SODIUM ACETATE, POTASSIUM CHLORIDE, MAGNESIUM CHLORIDE, SODIUM PHOSPHATE, DIBASIC, AND POTASSIUM PHOSPHATE .53; .5; .37; .037; .03; .012; .00082 G/100ML; G/100ML; G/100ML; G/100ML; G/100ML; G/100ML; G/100ML
1000 INJECTION, SOLUTION INTRAVENOUS ONCE
Status: DISCONTINUED | OUTPATIENT
Start: 2023-01-17 | End: 2023-01-17

## 2023-01-17 RX ADMIN — KETOROLAC TROMETHAMINE 15 MG: 30 INJECTION, SOLUTION INTRAMUSCULAR; INTRAVENOUS at 17:28

## 2023-01-17 RX ADMIN — MAGNESIUM SULFATE HEPTAHYDRATE 2 G: 40 INJECTION, SOLUTION INTRAVENOUS at 17:17

## 2023-01-17 RX ADMIN — ACETAMINOPHEN 650 MG: 650 SUPPOSITORY RECTAL at 16:15

## 2023-01-17 RX ADMIN — SODIUM CHLORIDE 1000 ML: 0.9 INJECTION, SOLUTION INTRAVENOUS at 16:50

## 2023-01-17 RX ADMIN — PIPERACILLIN SODIUM AND TAZOBACTAM SODIUM 3.38 G: 36; 4.5 INJECTION, POWDER, LYOPHILIZED, FOR SOLUTION INTRAVENOUS at 22:33

## 2023-01-17 RX ADMIN — IOHEXOL 100 ML: 350 INJECTION, SOLUTION INTRAVENOUS at 16:37

## 2023-01-17 RX ADMIN — SODIUM CHLORIDE, SODIUM GLUCONATE, SODIUM ACETATE, POTASSIUM CHLORIDE, MAGNESIUM CHLORIDE, SODIUM PHOSPHATE, DIBASIC, AND POTASSIUM PHOSPHATE 100 ML/HR: .53; .5; .37; .037; .03; .012; .00082 INJECTION, SOLUTION INTRAVENOUS at 22:40

## 2023-01-17 RX ADMIN — VANCOMYCIN HYDROCHLORIDE 1750 MG: 1 INJECTION, POWDER, LYOPHILIZED, FOR SOLUTION INTRAVENOUS at 23:32

## 2023-01-17 RX ADMIN — CEFTRIAXONE SODIUM 1000 MG: 10 INJECTION, POWDER, FOR SOLUTION INTRAVENOUS at 16:07

## 2023-01-17 RX ADMIN — SODIUM CHLORIDE 1000 ML: 0.9 INJECTION, SOLUTION INTRAVENOUS at 16:06

## 2023-01-17 RX ADMIN — AZTREONAM 2000 MG: 2 INJECTION, POWDER, LYOPHILIZED, FOR SOLUTION INTRAMUSCULAR; INTRAVENOUS at 17:44

## 2023-01-17 NOTE — PROGRESS NOTES
[facility] Note:  8/21/4844 14:00 Nurses Note   Note Text: FOF laying face down with large amount of blood coming from forehead  Usual strength/ROM assessed- turned onto back to find L cheek swelling and 2cm X 1cm v shaped laceration to forehead  Moderate amount of bleeding from R AKA surgical site posteriorly and erythema under chin  Resident c/o nausea  Resident on coumadin therapy  C/O 10/10 pain in laceration to forehead and RLE  Pressure applied to laceration until EMS arrvied and transferred resident  VS- 98 5-98-/112  Family visiting at this time of fall and alerted staff  Supervisor notified  On-call [address] notified  EMS notified-EMS transferred resident to [facility] then transferred resident to [address] [place] [address] ER  Daughter, Perla Slater, updated regarding same  Irf sent to therapy to [facility] for new wheelchair  1/15/2023 12:50 SBAR   Situation: Toshia Nair has experienced a change in condition beginning 01/15/2023  The resident's condition has been treated/assessed by the following: not applicable/first occurrence of condition being reported  Background: Vital Signs were obtained: T 98 5 - 1/15/2023 13:07 Route: Forehead (non-contact) - P 98 - 1/15/2023 13:07 Pulse Type: Regular - R 16 0 - 1/15/2023 13:07 - /112 - 1/15/2023 13:07 Position: - O2 95 0 % - 1/12/2023 19:53 Method: Room Air and the most recent weight was W 157 0 lb - 1/12/2023 13:41 Scale: Mechanical Lift   DEWAYNE's Mental Status Evaluation was: no changes observed  Functional Status Evaluation was: falls (one or more)  DEWAYNE's Behavioral Evaluation was: no changes observed  The Respiratory Evaluation was: no changes observed  The Cardiovascular Evaluation was: SBP > 200 or < 100  The GI Evaluation was: no changes observed  The  Evaluation was: no changes observed  The Neurological Evaluation was: no changes observed  A Skin Evaluation was: other skin [id number] X 1cm v shaped laceration to forehead   L cheek swelling  Assessment: Resident attempting to reposition self in w/c when she fell out of w/c onto floor striking head/face  Laceration to forehead and L cheek swelling noted with large amount of bleeding from laceration  Recommendations: Mike JOHNSON Recommendations and nursing measures taken include: Send to [facility] for eval  New Orders include transfer to the hospital and family Rola Grover was notified regarding the change in condition and all new orders/nursing measures  1/16/2023:  S/p fall  Pt seen and examined as had fallen yesterday over the weekend  Pt had attempted to adjust herself in the chair and had fallen forward  (pt with B/L aka)  Pt appears tearful but stable  Says pain not controlled  Complains of Rt stump pain  Appears to have bruise but now healing  Face has Rt centerl forehead laceration with sutures in it  Pt has bruises on face & over chin  Gen -A&Ox3; Cvs- RRR; lungs- CTAB; ABD - soft, nt/nd, +BS; pt with b/l AKA    Pt on oxycontin 10 mg 1 tab po BID  Pt says pain not controlled so adding prn oxycodone 5 mg every 4 hours prn breakthrough pain that pt was on at the hospital  Pt was on roxicodone 7 5 mg q 4 prn in addition to the oxycontin 10 mg 1 tab po BID  (see 12/7/2022 meds from [facility])      [facility] 12/13/2022 hematology/onc apn note:  "•  oxyCODONE (OxyCONTIN) 12 hr tablet 10 mg, 10 mg, Oral, Q12H [facility], Tyler Albarado MD, 10 mg at 12/13/22 0856  •  oxyCODONE (ROXICODONE) IR tablet 2 5 mg, 2 5 mg, Oral, Q4H PRN **OR** oxyCODONE (ROXICODONE) IR tablet 5 mg, 5 mg, Oral, Q4H PRN, 5 mg at 12/13/22 0548 **OR** oxyCODONE (ROXICODONE) IR tablet 7 5 mg, 7 5 mg, Oral, Q4H PRN, Tyler Albarado MD, 7 5 mg at 12/12/22 8427"

## 2023-01-17 NOTE — ED NOTES
pa cath only had another approx 20ml urine output  ct result showed urinary cath possibly in vagina  pa removed with robert craig and new temp sensing pa cath placed with robert craig  Initial output 350ml, draining well to gravity        Bri Alba RN  01/17/23 6557

## 2023-01-17 NOTE — SEPSIS NOTE
Sepsis Note   Mitchel Bocanegra 65 y o  female MRN: [id number]  Unit/Bed#: ED 22 Encounter: [id number]       qSOFA     [address] Name 01/17/23 1551                Altered mental status GCS < 15 --        Respiratory Rate > / =35 1        Systolic BP < / =707 0        Q Sofa Score 1                   Initial Sepsis Screening     Row Name 01/17/23 1622                Is the patient's history suggestive of a new or worsening infection? Yes (Proceed)  -NB        Suspected source of infection suspect infection, source unknown  -NB        Are two or more of the following signs & symptoms of infection both present and new to the patient? Yes (Proceed)  -NB        Indicate SIRS criteria Hyperthemia > 38 3C (100 9F); Tachycardia > 90 bpm;Tachypnea > 20 resp per min;Leukocytosis (WBC > 65185 IJL)  -NB        If the answer is yes to both questions, suspicion of sepsis is present --        If severe sepsis is present AND tissue hypoperfusion perists in the hour after fluid resuscitation or lactate > 4, the patient meets criteria for SEPTIC SHOCK --        Are any of the following organ dysfunction criteria present within 6 hours of suspected infection and SIRS criteria that are NOT considered to be chronic conditions? No  -NB        Organ dysfunction --        Date of presentation of severe sepsis --        Time of presentation of severe sepsis --        Tissue hypoperfusion persists in the hour after crystalloid fluid administration, evidenced, by either: --        Was hypotension present within one hour of the conclusion of crystalloid fluid administration?  No  -NB        Date of presentation of septic shock --        Time of presentation of septic shock --              User Key  (r) = Recorded By, (t) = Taken By, (c) = Cosigned By    [address] Name Provider Type    NB Penadarsh Echols DO Physician

## 2023-01-17 NOTE — PROGRESS NOTES
Called by RN at [phone] that pt lethargic & temp 102; instructed RN to ship pt to the E R  Pt from [facility] so transfer to [facility] E R     At [phone] I called back & instructed RN to give pt Narcan 0 4 mg x 1 stat given pt on oxycodone  1520 - spoke to D2 staff - pt transferred by ambulance ten minutes ago  (I gave verbal orders as I am at another facility)

## 2023-01-17 NOTE — ED PROVIDER NOTES
History  Chief Complaint   Patient presents with   • Altered Mental Status     Arrives EMS from [facility] who reports altered mental status starting this morning  Pt was here recently for fall  Patient is a 36-year-old female with a history of bilateral AKA's, diabetes, hypertension, hyperlipidemia, CVA in 2015 coming in from nursing home  Patient was here several days ago after she fell forward of her wheelchair and struck her head  At that time she did have a laceration which was repaired  She also had CTs completed without any evidence of acute pathology  Cording to EMS, nursing staff found her altered mental status sometime today  Off was concerned that they gave her too much OxyContin gave her Narcan without any change in mental status  My exam patient is hypertensive tachycardic febrile meet sepsis criteria  She has difficulty with interaction as she moans and opens her eyes spontaneously    Concern not only for sepsis but traumatic injury given coagulopathy and trauma  We will also scan CT head max face C-spine and chest abdomen pelvis as well      History provided by:  Patient and EMS personnel  History limited by:  Mental status change  Altered Mental Status  Presenting symptoms: confusion    Presenting symptoms: no disorientation    Severity:  Moderate  Most recent episode: Today  Episode history:  Continuous  Timing:  Constant  Progression:  Unchanged  Chronicity:  New  Context: not alcohol use, not dementia, not drug use, not head injury, not homeless, taking medications as prescribed, not nursing home resident, not recent change in medication, not recent illness and not recent infection        Prior to Admission Medications   Prescriptions Last Dose Informant Patient Reported? Taking?    ALPRAZolam (XANAX) 0 25 mg tablet   No No   Sig: Take 1 tablet (0 25 mg total) by mouth daily as needed for anxiety (panic attacks)   Cholecalciferol (Vitamin D-3) 25 MCG (1000 UT) CAPS   Yes No   Sig: Take 2,000 Units by mouth daily   Patient not taking: Reported on 10/21/2022   Continuous Blood Gluc  (FreeStyle Noé 14 Day [place]) Doug Sweeney   No No   Sig: Use 1 Units continuous   Patient not taking: Reported on 10/21/2022   Continuous Blood Gluc Sensor (FreeStyle Noé 14 Day Sensor) MISC   No No   Sig: Use daily as directed for CGM - Change every 14 days   Patient not taking: No sig reported   Doxylamine Succinate, Sleep, (UNISOM PO)   Yes No   Sig: Take by mouth   Patient not taking: No sig reported   Insulin Pen Needle (B-D UF III MINI PEN NEEDLES) 31G X 5 MM MISC   No No   Sig: USE WITH INSULIN FOUR TIMES DAILY   Lancets 28G MISC   No No   Sig: Use 1 each 4 (four) times a day   Patient not taking: No sig reported   Multiple Vitamin (multivitamin) tablet   Yes No   Sig: Take 1 tablet by mouth daily   QUEtiapine (SEROquel) 25 mg tablet   No No   Sig: Take 1 tablet (25 mg total) by mouth daily at bedtime   Trulicity 8 67 XL/6 0KB SOPN   No No   Sig: INJECT 0 5 ML (0 75 MG TOTAL) UNDER THE SKIN ONCE A WEEK TUESDAY   amLODIPine (NORVASC) 10 mg tablet   No No   Sig: TAKE 1 TABLET BY MOUTH EVERY DAY   atorvastatin (LIPITOR) 40 mg tablet   No No   Sig: TAKE 1 TABLET BY MOUTH EVERY DAY   buPROPion (WELLBUTRIN XL) 150 mg 24 hr tablet   No No   Sig: Take 1 tablet (150 mg total) by mouth daily   clopidogrel (PLAVIX) 75 mg tablet   No No   Sig: Take 1 tablet (75 mg total) by mouth daily   clotrimazole-betamethasone (LOTRISONE) 1-0 05 % cream   No No   Sig: Apply topically 2 (two) times a day   doxazosin (CARDURA) 4 mg tablet   No No   Sig: Take 1 tablet (4 mg total) by mouth daily at bedtime   fluocinonide (LIDEX) 0 05 % ointment   No No   Sig: Apply topically 2 (two) times a day   gabapentin (NEURONTIN) 100 mg capsule   No No   Sig: TAKE 2 CAPSULES BY MOUTH TWICE A DAY   glucose blood (True Metrix Blood Glucose Test) test strip   No No   Sig: Use 1 each 4 (four) times a day   Patient not taking: No sig reported insulin aspart (NovoLOG FlexPen) 100 UNIT/ML injection pen   No No   Sig: Inject 20 Units under the skin 3 (three) times a day with meals   insulin glargine (Lantus SoloStar) 100 units/mL injection pen   No No   Sig: Inject 60 Units under the skin daily at bedtime   lisinopril (ZESTRIL) 40 mg tablet   No No   Sig: TAKE 1 TABLET BY MOUTH EVERY DAY   methocarbamol 1000 MG TABS   No No   Sig: Take 1,000 mg by mouth every 8 (eight) hours   metoprolol succinate (TOPROL-XL) 25 mg 24 hr tablet   No No   Sig: TAKE 1 TABLET (25 MG TOTAL) BY MOUTH DAILY  oxyCODONE (OxyCONTIN) 10 mg 12 hr tablet   No No   Sig: Take 1 tablet (10 mg total) by mouth every 12 (twelve) hours for 10 days Max Daily Amount: 20 mg   pantoprazole (PROTONIX) 40 mg tablet   No No   Sig: Take 1 tablet (40 mg total) by mouth daily for 14 days   potassium chloride (K-DUR,KLOR-CON) 10 mEq tablet   No No   Sig: Take 2 tablets (20 mEq total) by mouth 2 (two) times a day for 2 doses   sertraline (ZOLOFT) 100 mg tablet   No No   Sig: TAKE 1 TABLET BY MOUTH EVERY DAY   warfarin (COUMADIN) 5 mg tablet   No No   Sig: Take 5mg daily for goal INR 2-3      Facility-Administered Medications: None       Past Medical History:   Diagnosis Date   • Anxiety    • Depression    • Diabetes ([facility] )    • Diabetes mellitus ([facility] )    • Esophageal reflux     28 APR 2015 RESOLVED   • Hyperlipidemia    • Hypertension    • Low back pain     sciatica   • Pneumonia 2019   • Stroke ([facility] ) 12/2015    small vessel, L frontal corona radiata   Rx asa 81, Lipitor 40, risk modification   • Vitamin D deficiency        Past Surgical History:   Procedure Laterality Date   • AMPUTATION ABOVE KNEE (AKA) Right 12/1/2022    Procedure: (AKA), PSB  BKA;  Surgeon: Chandan Mcdermott DO;  Location: [facility];  Service: Vascular   • ARTERIOGRAM Right 11/7/2022    Procedure: -Right lower extremity angiogram -Right CFA balloon angioplasty with [id number]  [employer]  -Right CFA DCB with 6x40 Bard Lutonix -Right CFA atherectomy with Jetstream and distal embolization protection with 7mm Spider FX -Right SFA/Pop angioplasty with 4x40 Chololate balloon -Right SFA/Pop DCB with 5x150 Bard Lutonix -Right SFA stent with 6x80 W W  COINLAB x2 -R   • COLONOSCOPY     • IR AORTAGRAM WITH RUN-OFF  10/31/2022   • IR LOWER EXTREMITY ANGIOGRAM  11/10/2022   • IR LOWER EXTREMITY ANGIOGRAM  2022   • MS AMPUTATION THIGH THROUGH FEMUR ANY LEVEL Left 2022    Procedure: (AKA); Surgeon: Kyle Tinsley DO;  [facility];  Service: Vascular   • MS NEGATIVE PRESSURE WOUND THERAPY DME </= 50 SQ CM Bilateral 2022    Procedure: Right above knee amputation washout; vac change; Left above knee amputation debridement; vac placement;  Surgeon: Petra Cazares DO;  [facility]: [facility];  Service: Vascular   • WOUND DEBRIDEMENT Right 2022    Procedure: AKA STUMP WASHOUT, Left AKA Staple removal  application of wound vac to Right AKA; Surgeon: Len Posada MD;  [facility]: [facility];  Service: Vascular       Family History   Problem Relation Age of Onset   • Diabetes Mother    • Lung cancer Mother    • Cancer Father    • Lung cancer Father    • Hypertension Brother    • Hypertension Family      I have reviewed and agree with the history as documented  E-Cigarette/Vaping   • E-Cigarette Use Never User      E-Cigarette/Vaping Substances   • Nicotine No    • THC No    • CBD No    • Flavoring No    • Other No    • Unknown No      Social History     Tobacco Use   • Smoking status: Former     Types: Cigarettes     Quit date:      Years since quittin 0   • Smokeless tobacco: Never   Vaping Use   • Vaping Use: Never used   Substance Use Topics   • Alcohol use: Never     Comment: OCCASIONAL ALCOHOL USE IN ALL SCRIPTS   • Drug use: No       Review of Systems   Unable to perform ROS: Mental status change   Psychiatric/Behavioral: Positive for confusion         Physical Exam  Physical Exam  Vitals and nursing note reviewed  Constitutional:       General: She is not in acute distress  Appearance: She is well-developed  She is obese  She is ill-appearing and diaphoretic  HENT:      Head: Normocephalic  Mouth/Throat:      Lips: Pink  Mouth: Mucous membranes are dry  Comments: Maintaining airway and maintaining secretions  Dry mucous membranes  No brawniness under the tongue  Eyes:      General: Lids are normal       Extraocular Movements: Extraocular movements intact  Conjunctiva/sclera: Conjunctivae normal       Pupils: Pupils are equal, round, and reactive to light  Cardiovascular:      Rate and Rhythm: Regular rhythm  Tachycardia present  Pulses:           Radial pulses are 2+ on the right side and 2+ on the left side  Heart sounds: Normal heart sounds, S1 normal and S2 normal  No murmur heard  Comments: Bilateral AKA  Well-healing wounds  Pulmonary:      Effort: Tachypnea present  No respiratory distress  Breath sounds: Examination of the right-lower field reveals decreased breath sounds  Examination of the left-lower field reveals decreased breath sounds  Decreased breath sounds present  Abdominal:      General: Abdomen is protuberant  Bowel sounds are normal       Palpations: Abdomen is soft  Tenderness: There is no abdominal tenderness  Musculoskeletal:         General: No swelling  Cervical back: Neck supple  Skin:     General: Skin is warm  Capillary Refill: Capillary refill takes less than 2 seconds  Neurological:      Mental Status: She is lethargic and disoriented  GCS: GCS eye subscore is 4  GCS verbal subscore is 1  GCS motor subscore is 4  Cranial Nerves: Cranial nerves 2-12 are intact  Comments: Patient opens eyes spontaneously and moans incoherently  She was drawls to painful stimuli  She does not follow commands  However her extraocular muscles are intact     Psychiatric:         Mood and Affect: Mood normal       Comments: Unable to assess         Vital Signs  ED Triage Vitals   Temperature Pulse Respirations Blood Pressure SpO2   01/17/23 1608 01/17/23 1551 01/17/23 1551 01/17/23 1551 01/17/23 1551   (!) 104 2 °F (40 1 °C) (!) 143 (!) 24 (!) 209/101 95 %      Temp Source Heart Rate Source Patient Position - Orthostatic VS BP Location FiO2 (%)   01/17/23 1608 01/17/23 1551 01/17/23 1551 01/17/23 1551 --   Rectal Monitor Sitting Right arm       Pain Score       01/17/23 1615       Med Not Given for Pain - for MAR use only           Vitals:    01/17/23 1551 01/17/23 1724 01/17/23 1730 01/17/23 1800   BP: (!) 209/101 161/73 161/73 159/74   Pulse: (!) 143 (!) 113 (!) 112 (!) 108   Patient Position - Orthostatic VS: Sitting Lying           Visual Acuity      ED Medications  Medications   magnesium sulfate 2 g/50 mL IVPB (premix) 2 g (2 g Intravenous New Bag 1/17/23 1717)   aztreonam (AZACTAM) 2,000 mg in sodium chloride 0 9 % 100 mL IVPB (2,000 mg Intravenous New Bag 1/17/23 1744)   multi-electrolyte (ISOLYTE-S PH 7 4) bolus 1,000 mL (has no administration in time range)   acetaminophen (TYLENOL) rectal suppository 650 mg (650 mg Rectal Given 1/17/23 1615)   ceftriaxone (ROCEPHIN) 1 g/50 mL in dextrose IVPB (0 mg Intravenous Stopped 1/17/23 1640)   sodium chloride 0 9 % bolus 1,000 mL (1,000 mL Intravenous New Bag 1/17/23 1650)   sodium chloride 0 9 % bolus 1,000 mL (1,000 mL Intravenous New Bag 1/17/23 1606)   iohexol (OMNIPAQUE) 350 MG/ML injection (SINGLE-DOSE) 100 mL (100 mL Intravenous Given 1/17/23 1637)   ketorolac (TORADOL) injection 15 mg (15 mg Intravenous Given 1/17/23 1728)       Diagnostic Studies  Results Reviewed     Procedure Component Value Units Date/Time    Urine Microscopic [[id number]]  (Abnormal) Collected: 01/17/23 1641    Lab Status: Final result Specimen: Urine, Straight Cath Updated: 01/17/23 1808     RBC, UA 4-10 /hpf      WBC, UA 30-50 /hpf      Epithelial Cells Innumerable /hpf      Bacteria, UA Innumerable /hpf      OTHER OBSERVATIONS WBCs Clumped  Yeast Cells Present     MUCUS THREADS Occasional     URINE COMMENT Concentrated microscopic performed on low-volume urine    Urine culture [[id number]] Collected: 01/17/23 1641    Lab Status: In process Specimen: Urine, Straight Cath Updated: 01/17/23 1808    FLU/RSV/COVID - if FLU/RSV clinically relevant [[id number]]  (Normal) Collected: 01/17/23 1605    Lab Status: Final result Specimen: Nares from Nose Updated: 01/17/23 1746     SARS-CoV-2 Negative     INFLUENZA A PCR Negative     INFLUENZA B PCR Negative     RSV PCR Negative    Narrative:      FOR PEDIATRIC PATIENTS - copy/paste COVID Guidelines URL to browser: [email or web address]    SARS-CoV-2 assay is a Nucleic Acid Amplification assay intended for the  qualitative detection of nucleic acid from SARS-CoV-2 in nasopharyngeal  swabs  Results are for the presumptive identification of SARS-CoV-2 RNA  Positive results are indicative of infection with SARS-CoV-2, the virus  causing COVID-19, but do not rule out bacterial infection or co-infection  with other viruses  Laboratories within the [place] and its  territories are required to report all positive results to the appropriate  public health authorities  Negative results do not preclude SARS-CoV-2  infection and should not be used as the sole basis for treatment or other  patient management decisions  Negative results must be combined with  clinical observations, patient history, and epidemiological information  This test has not been FDA cleared or approved  This test has been authorized by FDA under an Emergency Use Authorization  (EUA)   This test is only authorized for the duration of time the  declaration that circumstances exist justifying the authorization of the  emergency use of an in vitro diagnostic tests for detection of SARS-CoV-2  virus and/or diagnosis of COVID-19 infection under section 564(b)(1) of  the Act, 21 U  S C  872WXX-0(F)(0), unless the authorization is terminated  or revoked sooner  The test has been validated but independent review by FDA  and CLIA is pending  Test performed using [employer] GeneXpert: This RT-PCR assay targets N2,  a region unique to SARS-CoV-2  A conserved region in the E-gene was chosen  for pan-Sarbecovirus detection which includes SARS-CoV-2  According to CMS-2020-01-R, this platform meets the definition of high-throughput technology      UA w Reflex to Microscopic w Reflex to Culture [[id number]]  (Abnormal) Collected: 01/17/23 1641    Lab Status: Final result Specimen: Urine, Straight Cath Updated: 01/17/23 1721     Color, UA Yellow     Clarity, UA Cloudy     Specific Gravity, UA 1 025     pH, UA 6 0     Leukocytes, UA Small     Nitrite, UA Negative     Protein,  (2+) mg/dl      Glucose, UA Negative mg/dl      Ketones, UA Negative mg/dl      Urobilinogen, UA 0 2 E U /dl      Bilirubin, UA Negative     Occult Blood, UA Large    Protime-INR [[id number]]  (Abnormal) Collected: 01/17/23 1605    Lab Status: Edited Result - FINAL Specimen: Blood from Arm, Left Updated: 01/17/23 1653     Protime 30 6 seconds      INR 2 96    APTT [[id number]]  (Abnormal) Collected: 01/17/23 1605    Lab Status: Edited Result - FINAL Specimen: Blood from Arm, Left Updated: 01/17/23 1653     PTT 57 seconds     Procalcitonin [[id number]]  (Abnormal) Collected: 01/17/23 1605    Lab Status: Final result Specimen: Blood from Arm, Left Updated: 01/17/23 1651     Procalcitonin 0 27 ng/ml     HS Troponin 0hr (reflex protocol) [[id number]]  (Normal) Collected: 01/17/23 1605    Lab Status: Final result Specimen: Blood from Arm, Left Updated: 01/17/23 1648     hs TnI 0hr 20 ng/L     HS Troponin I 2hr [[id number]]     Lab Status: No result Specimen: Blood     HS Troponin I 4hr [[id number]]     Lab Status: No result Specimen: Blood     Lactic acid [[id number]]  (Normal) Collected: 01/17/23 1605    Lab Status: Final result Specimen: Blood from Arm, Left Updated: 01/17/23 1639     LACTIC ACID 1 1 mmol/L     Narrative:      Result may be elevated if tourniquet was used during collection      Comprehensive metabolic panel [[id number]]  (Abnormal) Collected: 01/17/23 1605    Lab Status: Final result Specimen: Blood from Arm, Left Updated: 01/17/23 1639     Sodium 132 mmol/L      Potassium 3 5 mmol/L      Chloride 98 mmol/L      CO2 22 mmol/L      ANION GAP 12 mmol/L      BUN 20 mg/dL      Creatinine 0 82 mg/dL      Glucose 95 mg/dL      Calcium 8 4 mg/dL      Corrected Calcium 9 0 mg/dL      AST 15 U/L      ALT 9 U/L      Alkaline Phosphatase 131 U/L      Total Protein 7 1 g/dL      Albumin 3 2 g/dL      Total Bilirubin 0 37 mg/dL      eGFR 77 ml/min/1 73sq m     Narrative:      [place] guidelines for Chronic Kidney Disease (CKD):   •  Stage 1 with normal or high GFR (GFR > 90 mL/min/1 73 square meters)  •  Stage 2 Mild CKD (GFR = 60-89 mL/min/1 73 square meters)  •  Stage 3A Moderate CKD (GFR = 45-59 mL/min/1 73 square meters)  •  Stage 3B Moderate CKD (GFR = 30-44 mL/min/1 73 square meters)  •  Stage 4 Severe CKD (GFR = 15-29 mL/min/1 73 square meters)  •  Stage 5 End Stage CKD (GFR <15 mL/min/1 73 square meters)  Note: GFR calculation is accurate only with a steady state creatinine    Magnesium [[id number]]  (Abnormal) Collected: 01/17/23 1605    Lab Status: Final result Specimen: Blood from Arm, Left Updated: 01/17/23 1639     Magnesium 1 5 mg/dL     Blood gas, Venous [[id number]]  (Abnormal) Collected: 01/17/23 1605    Lab Status: Final result Specimen: Blood from Arm, Left Updated: 01/17/23 1628     pH, Pedro 7 488     pCO2, Pedro 29 3 mm Hg      pO2, Pedro 54 0 mm Hg      HCO3, Pedro 21 7 mmol/L      Base Excess, Pedro -0 9 mmol/L      O2 Content, Pedro 13 6 ml/dL      O2 HGB, VENOUS 87 7 %     CBC and differential [[id number]]  (Abnormal) Collected: 01/17/23 1605 Lab Status: Final result Specimen: Blood from Arm, Left Updated: 01/17/23 1621     WBC 16 48 Thousand/uL      RBC 3 78 Million/uL      Hemoglobin 9 7 g/dL      Hematocrit 31 2 %      MCV 83 fL      MCH 25 7 pg      MCHC 31 1 g/dL      RDW 17 1 %      MPV 8 7 fL      Platelets 812 Thousands/uL      nRBC 0 /100 WBCs      Neutrophils Relative 88 %      Immat GRANS % 1 %      Lymphocytes Relative 5 %      Monocytes Relative 6 %      Eosinophils Relative 0 %      Basophils Relative 0 %      Neutrophils Absolute 14 53 Thousands/µL      Immature Grans Absolute 0 14 Thousand/uL      Lymphocytes Absolute 0 82 Thousands/µL      Monocytes Absolute 0 94 Thousand/µL      Eosinophils Absolute 0 00 Thousand/µL      Basophils Absolute 0 05 Thousands/µL     Blood culture #2 [[id number]] Collected: 01/17/23 1606    Lab Status: In process Specimen: Blood from Arm, Left Updated: 01/17/23 1615    Blood culture #1 [[id number]] Collected: 01/17/23 1553    Lab Status: In process Specimen: Blood from Arm, Left Updated: 01/17/23 1615                 CT chest abdomen pelvis w contrast   Final Result by Maral Angeles MD (01/17 1744)      No evidence of acute trauma in the chest, abdomen or pelvis  Workstation performed: [id number]         CT head without contrast   Final Result by Maral Angeles MD (01/17 1714)      No acute intracranial hemorrhage or mass effect  Workstation performed: [id number]         CT facial bones without contrast   Final Result by Maral Anglees MD (01/17 1718)      No evidence of acute maxillofacial fracture  Workstation performed: [id number]         CT spine cervical without contrast   Final Result by Maral Angeles MD (01/17 1720)      No acute cervical spine fracture or traumatic malalignment                     Workstation performed: [id number]                    Procedures  CriticalCare Time  Performed by: Deejay Barnes DO  Authorized by: Blanca Li DO Wesley     Critical care provider statement:     Critical care time (minutes):  40    Critical care was necessary to treat or prevent imminent or life-threatening deterioration of the following conditions:  Sepsis and trauma    Critical care was time spent personally by me on the following activities:  Blood draw for specimens, obtaining history from patient or surrogate, development of treatment plan with patient or surrogate, discussions with primary provider, evaluation of patient's response to treatment, discussions with consultants, examination of patient, review of old charts, re-evaluation of patient's condition, ordering and review of radiographic studies, ordering and review of laboratory studies and ordering and performing treatments and interventions             ED Course  ED Course as of 01/17/23 1812 Tue Jan 17, 2023   1603 Patient is a 72-year-old male coming in with altered mental status  On exam she opens her eyes spontaneously but only responds to painful stimuli  She has diffuse Ecchymosis throughout Face and Chin  She Also Meet Sepsis Criteria We Will Start Sepsis Work-Up As Well As Trauma Work-Up    Disclosure: Voice to text software was used in the preparation of this document and could have resulted in translational errors       Occasional wrong word or "sound a like" substitutions may have occurred due to the inherent limitations of voice recognition software   Read the chart carefully and recognize, using context, where substitutions have occurred         1622 Patient needs sepsis criteria  Hemoglobin is stable for patient compared to old  White count is elevated greater than 16,000   1641 Patient's sodium mildly low  Lactic acid within normal limits  Mag low and will replace  No evidence of endorgan damage at this time   1648 Patient requiring 2 L of oxygen as her O2 sat dropped to 88%  Heart rate is trending down  Are is therapeutic    Troponin 20 most likely due to demand and will continue to trend troponin    Since INR is elevated however chronically elevated due to Coumadin use  Procalcitonin elevated   1654 Patient's daughter is at bedside  Long discussion with her regarding work-up and symptoms  She is aware of treatment thus far   1719 CT head without acute traumatic injury and no fracture of max face  1724 CT C-spine without acute fracture  Urine with leukocytes and protein and blood  Pending microscopic  Will give second antibiotic based on old urine cultures   1746 CT chest abdomen and pelvis without acute pathology  COVID/Flu  negative  Will plan for admission   1751 Patient is opening her eyes to name  She tells me her daughter's name is Nikki Mediate  Her GCS is improving  Vital signs are improving as well  Long discussion with patient and will his family regarding admission  Presumed UTI at this time  [phone] Patient is more alert  She can tell me her name and her daughters name  She can follow one-step simple commands  She is tachycardic however improved from prior  Vital signs improved as well  Long discussion with patient's Isabelle Elisa as well as patient regarding work-up  Presumed UTI at this time  Urine culture and blood culture sent  Discussed with Dr Pk Jacob  We had a detailed discussion of the patient's condition and case,  including need for admission  Accepts to his/her service  Bed request/bridging orders placed  1810 Urine microscopic with bacteria, epithelial cells clumped WBCs    We will continue with UTI treatment             HEART Risk Score    Flowsheet Row Most Recent Value   Heart Score Risk Calculator    History 0 Filed at: 01/17/2023 1649   ECG 1 Filed at: 01/17/2023 1649   Age 1 Filed at: 01/17/2023 1649   Risk Factors 2 Filed at: 01/17/2023 1649   Troponin 1 Filed at: 01/17/2023 1649   HEART Score 5 Filed at: 01/17/2023 1649                     Initial Sepsis Screening     Row Name 01/17/23 1622                Is the patient's history suggestive of a new or worsening infection? Yes (Proceed)  -NB        Suspected source of infection suspect infection, source unknown  -NB        Are two or more of the following signs & symptoms of infection both present and new to the patient? Yes (Proceed)  -NB        Indicate SIRS criteria Hyperthemia > 38 3C (100 9F); Tachycardia > 90 bpm;Tachypnea > 20 resp per min;Leukocytosis (WBC > 98989 IJL)  -NB        If the answer is yes to both questions, suspicion of sepsis is present --        If severe sepsis is present AND tissue hypoperfusion perists in the hour after fluid resuscitation or lactate > 4, the patient meets criteria for SEPTIC SHOCK --        Are any of the following organ dysfunction criteria present within 6 hours of suspected infection and SIRS criteria that are NOT considered to be chronic conditions? No  -NB        Organ dysfunction --        Date of presentation of severe sepsis --        Time of presentation of severe sepsis --        Tissue hypoperfusion persists in the hour after crystalloid fluid administration, evidenced, by either: --        Was hypotension present within one hour of the conclusion of crystalloid fluid administration?  No  -NB        Date of presentation of septic shock --        Time of presentation of septic shock --              User Key  (r) = Recorded By, (t) = Taken By, (c) = Cosigned By    [address] Name Provider Type    CIRA Ponce DO Physician              Default Flowsheet Data (last 720 hours)     Sepsis Reassess     Row Name 01/17/23 2286                   Repeat Volume Status and Tissue Perfusion Assessment Performed    Repeat Volume Status and Tissue Perfusion Assessment Performed Yes  -NB           Volume Status and Tissue Perfusion Post Fluid Resuscitation * Must Document All *    Vital Signs Reviewed (HR, RR, BP, T) Yes  -NB        Shock Index Reviewed --        Arterial Oxygen Saturation Reviewed (POx, SaO2 or SpO2) Yes (comment %)  -NB Cardio Tachycardia;Normal S1/S2  -NB        Pulmonary Normal effort  -NB        Capillary Refill Brisk  -NB        Peripheral Pulses Radial  -NB        Peripheral Pulse +2  -NB        Skin Warm  -NB        Urine output assessed --           *OR*   Intensive Monitoring- Must Document One of the Following Four *:    Vital Signs Reviewed Yes  -NB        * Central Venous Pressure (CVP or RAP) --        * Central Venous Oxygen (SVO2, ScvO2 or Oxygen saturation via central catheter) --        * Bedside Cardiovascular US in IVC diameter and % collapse --        * Passive Leg Raise OR Crystalloid Challenge --              User Key  (r) = Recorded By, (t) = Taken By, (c) = Cosigned By    Initials Name Provider Type    NB Justino Nolan DO Physician                            Medical Decision Making      EKG INTERPRETATION @ 1558  RHYTHM: Sinus tachycardia at 160 bpm  AXIS: Normal axis  INTERVALS: VA interval measured at 116 ms  QRS COMPLEX: QRS measured at 74 ms  ST SEGMENT: Nonspecific ST segment changes  Diffuse artifact  Mild diffuse ST depression in V4 V5 V6 as well as 2    QT INTERVAL: QTC measured at 468 ms  COMPARED WITH PRIOR compared to old EKG on November 14, 2022 now tachycardic  Interpretation by Selin Lyon DO    Differential diagnosis includes but not limited to:   Intracranial hemorrhage, intracranial tumor, seizures, hypertensive encephalopathy, hypoglycemia, DKA, HHNK, dementia, Louie's paralysis, hypoglycemia/hyperglycemia, hypocalcemia/hypercalcemia, hypothermia, hyperthermia, hypoxia, hypercarbia, overdose (opioids, barbiturates, alcohol, benzodiazepines, Etc), meningitis, abscess, encephalitis, complicated migraine, shock, uremia      Altered mental status: acute illness or injury  Coagulopathy [facility]): acute illness or injury  Fever: acute illness or injury  Hypomagnesemia: acute illness or injury  Sepsis [facility]): acute illness or injury  UTI (urinary tract infection): acute illness or injury  Amount and/or Complexity of Data Reviewed  Independent Historian: caregiver and EMS     Details: Since initial GCS is <10; daughter at beside  Labs: ordered  Decision-making details documented in ED Course  Radiology: ordered  Decision-making details documented in ED Course  ECG/medicine tests: ordered and independent interpretation performed  Decision-making details documented in ED Course  Risk  OTC drugs  Prescription drug management  Decision regarding hospitalization  Critical Care  Total time providing critical care: 30-74 minutes      Disposition  Final diagnoses: Altered mental status   Sepsis ([facility] [address] )   [facility]   Fever   Hypomagnesemia   UTI (urinary tract infection)     Time reflects when diagnosis was documented in both MDM as applicable and the Disposition within this note     Time User Action Codes Description Comment    1/17/2023  4:41 PM Sameera Pedro Add [R41 82] Altered mental status     1/17/2023  4:42 PM Bendock, Jaja L Add [A41 9] Sepsis ([facility] [address] )     1/17/2023  5:01 PM Sameera Pedro Add [D68 9] Coagulopathy ([facility]     1/17/2023  5:01 PM Bendock, Deneen James L Add [R50 9] Fever     1/17/2023  5:54 PM Bendock, Deneen James L Add [E83 42] Hypomagnesemia     1/17/2023  6:11 PM Bendock, Deneen James L Add [N39 0] UTI (urinary tract infection)       ED Disposition     None      Follow-up Information    None         Patient's Medications   Discharge Prescriptions    No medications on file       No discharge procedures on file      PDMP Review       Value Time User    PDMP Reviewed  Yes 1/12/2023 12:02 PM Wade Vazquez MD          ED Provider  Electronically Signed by           Sarah Figueredo DO  01/17/23 6500

## 2023-01-18 ENCOUNTER — APPOINTMENT (INPATIENT)
Dept: CT IMAGING | Facility: HOSPITAL | Age: 63
End: 2023-01-18

## 2023-01-18 ENCOUNTER — APPOINTMENT (INPATIENT)
Dept: RADIOLOGY | Facility: HOSPITAL | Age: 63
End: 2023-01-18

## 2023-01-18 ENCOUNTER — APPOINTMENT (INPATIENT)
Dept: NON INVASIVE DIAGNOSTICS | Facility: HOSPITAL | Age: 63
End: 2023-01-18

## 2023-01-18 PROBLEM — R77.8 TROPONIN LEVEL ELEVATED: Status: ACTIVE | Noted: 2023-01-18

## 2023-01-18 PROBLEM — E83.42 HYPOMAGNESEMIA: Status: ACTIVE | Noted: 2023-01-18

## 2023-01-18 PROBLEM — E87.20 METABOLIC ACIDOSIS: Status: ACTIVE | Noted: 2023-01-18

## 2023-01-18 PROBLEM — R79.89 TROPONIN LEVEL ELEVATED: Status: ACTIVE | Noted: 2023-01-18

## 2023-01-18 PROBLEM — I46.9 CARDIAC ARREST (HCC): Status: ACTIVE | Noted: 2023-01-18

## 2023-01-18 LAB
2HR DELTA HS TROPONIN: -206 NG/L
4HR DELTA HS TROPONIN: -192 NG/L
ALBUMIN SERPL BCP-MCNC: 2.6 G/DL (ref 3.5–5)
ALBUMIN SERPL BCP-MCNC: 2.7 G/DL (ref 3.5–5)
ALBUMIN SERPL BCP-MCNC: 3.1 G/DL (ref 3.5–5)
ALP SERPL-CCNC: 132 U/L (ref 34–104)
ALP SERPL-CCNC: 134 U/L (ref 34–104)
ALP SERPL-CCNC: 140 U/L (ref 34–104)
ALT SERPL W P-5'-P-CCNC: 10 U/L (ref 7–52)
ALT SERPL W P-5'-P-CCNC: 101 U/L (ref 7–52)
ALT SERPL W P-5'-P-CCNC: 71 U/L (ref 7–52)
ANION GAP SERPL CALCULATED.3IONS-SCNC: 16 MMOL/L (ref 4–13)
ANION GAP SERPL CALCULATED.3IONS-SCNC: 19 MMOL/L (ref 4–13)
ANION GAP SERPL CALCULATED.3IONS-SCNC: 9 MMOL/L (ref 4–13)
ANISOCYTOSIS BLD QL SMEAR: PRESENT
AORTIC VALVE MEAN VELOCITY: 10.6 M/S
APICAL FOUR CHAMBER EJECTION FRACTION: 44 %
AST SERPL W P-5'-P-CCNC: 174 U/L (ref 13–39)
AST SERPL W P-5'-P-CCNC: 20 U/L (ref 13–39)
AST SERPL W P-5'-P-CCNC: 89 U/L (ref 13–39)
ATRIAL RATE: 127 BPM
ATRIAL RATE: 130 BPM
ATRIAL RATE: 132 BPM
AV LVOT MEAN GRADIENT: 2 MMHG
AV LVOT PEAK GRADIENT: 3 MMHG
AV MEAN GRADIENT: 5 MMHG
AV PEAK GRADIENT: 8 MMHG
AV VELOCITY RATIO: 0.62
BASE EXCESS BLDA CALC-SCNC: -4.4 MMOL/L
BASOPHILS # BLD AUTO: 0.04 THOUSANDS/ÂΜL (ref 0–0.1)
BASOPHILS # BLD AUTO: 0.05 THOUSANDS/ÂΜL (ref 0–0.1)
BASOPHILS # BLD MANUAL: 0.27 THOUSAND/UL (ref 0–0.1)
BASOPHILS NFR BLD AUTO: 0 % (ref 0–1)
BASOPHILS NFR BLD AUTO: 0 % (ref 0–1)
BASOPHILS NFR MAR MANUAL: 1 % (ref 0–1)
BILIRUB SERPL-MCNC: 0.37 MG/DL (ref 0.2–1)
BILIRUB SERPL-MCNC: 0.49 MG/DL (ref 0.2–1)
BILIRUB SERPL-MCNC: 0.52 MG/DL (ref 0.2–1)
BUN SERPL-MCNC: 13 MG/DL (ref 5–25)
BUN SERPL-MCNC: 15 MG/DL (ref 5–25)
BUN SERPL-MCNC: 22 MG/DL (ref 5–25)
CALCIUM ALBUM COR SERPL-MCNC: 8.1 MG/DL (ref 8.3–10.1)
CALCIUM ALBUM COR SERPL-MCNC: 8.6 MG/DL (ref 8.3–10.1)
CALCIUM ALBUM COR SERPL-MCNC: 8.6 MG/DL (ref 8.3–10.1)
CALCIUM SERPL-MCNC: 7.1 MG/DL (ref 8.4–10.2)
CALCIUM SERPL-MCNC: 7.5 MG/DL (ref 8.4–10.2)
CALCIUM SERPL-MCNC: 7.9 MG/DL (ref 8.4–10.2)
CARDIAC TROPONIN I PNL SERPL HS: 1077 NG/L (ref 8–18)
CARDIAC TROPONIN I PNL SERPL HS: 1770 NG/L
CARDIAC TROPONIN I PNL SERPL HS: 1784 NG/L
CARDIAC TROPONIN I PNL SERPL HS: 1976 NG/L
CARDIAC TROPONIN I PNL SERPL HS: 2369 NG/L (ref 8–18)
CHLORIDE SERPL-SCNC: 105 MMOL/L (ref 96–108)
CHLORIDE SERPL-SCNC: 96 MMOL/L (ref 96–108)
CHLORIDE SERPL-SCNC: 97 MMOL/L (ref 96–108)
CO2 SERPL-SCNC: 16 MMOL/L (ref 21–32)
CO2 SERPL-SCNC: 21 MMOL/L (ref 21–32)
CO2 SERPL-SCNC: 23 MMOL/L (ref 21–32)
CREAT SERPL-MCNC: 0.68 MG/DL (ref 0.6–1.3)
CREAT SERPL-MCNC: 0.82 MG/DL (ref 0.6–1.3)
CREAT SERPL-MCNC: 0.97 MG/DL (ref 0.6–1.3)
DOP CALC AO PEAK VEL: 1.37 M/S
DOP CALC AO VTI: 17.36 CM
DOP CALC LVOT PEAK VEL VTI: 12.02 CM
DOP CALC LVOT PEAK VEL: 0.85 M/S
E CLOAC COMP DNA BLD POS NAA+NON-PROBE: DETECTED
E FAECALIS DNA BLD POS QL NAA+NON-PROBE: DETECTED
EOSINOPHIL # BLD AUTO: 0 THOUSAND/ÂΜL (ref 0–0.61)
EOSINOPHIL # BLD AUTO: 0 THOUSAND/ÂΜL (ref 0–0.61)
EOSINOPHIL # BLD MANUAL: 0 THOUSAND/UL (ref 0–0.4)
EOSINOPHIL NFR BLD AUTO: 0 % (ref 0–6)
EOSINOPHIL NFR BLD AUTO: 0 % (ref 0–6)
EOSINOPHIL NFR BLD MANUAL: 0 % (ref 0–6)
ERYTHROCYTE [DISTWIDTH] IN BLOOD BY AUTOMATED COUNT: 17.1 % (ref 11.6–15.1)
ERYTHROCYTE [DISTWIDTH] IN BLOOD BY AUTOMATED COUNT: 17.1 % (ref 11.6–15.1)
ERYTHROCYTE [DISTWIDTH] IN BLOOD BY AUTOMATED COUNT: 17.2 % (ref 11.6–15.1)
FRACTIONAL SHORTENING: 17 (ref 28–44)
GFR SERPL CREATININE-BSD FRML MDRD: 63 ML/MIN/1.73SQ M
GFR SERPL CREATININE-BSD FRML MDRD: 77 ML/MIN/1.73SQ M
GFR SERPL CREATININE-BSD FRML MDRD: 94 ML/MIN/1.73SQ M
GLUCOSE SERPL-MCNC: 123 MG/DL (ref 65–140)
GLUCOSE SERPL-MCNC: 129 MG/DL (ref 65–140)
GLUCOSE SERPL-MCNC: 135 MG/DL (ref 65–140)
GLUCOSE SERPL-MCNC: 146 MG/DL (ref 65–140)
GLUCOSE SERPL-MCNC: 147 MG/DL (ref 65–140)
GLUCOSE SERPL-MCNC: 151 MG/DL (ref 65–140)
GLUCOSE SERPL-MCNC: 158 MG/DL (ref 65–140)
GLUCOSE SERPL-MCNC: 166 MG/DL (ref 65–140)
GLUCOSE SERPL-MCNC: 210 MG/DL (ref 65–140)
GLUCOSE SERPL-MCNC: 236 MG/DL (ref 65–140)
GLUCOSE SERPL-MCNC: 278 MG/DL (ref 65–140)
GLUCOSE SERPL-MCNC: 320 MG/DL (ref 65–140)
GLUCOSE SERPL-MCNC: 351 MG/DL (ref 65–140)
GLUCOSE SERPL-MCNC: 433 MG/DL (ref 65–140)
HCO3 BLDA-SCNC: 18.9 MMOL/L (ref 22–28)
HCT VFR BLD AUTO: 24.8 % (ref 34.8–46.1)
HCT VFR BLD AUTO: 29 % (ref 34.8–46.1)
HCT VFR BLD AUTO: 30.5 % (ref 34.8–46.1)
HGB BLD-MCNC: 7.7 G/DL (ref 11.5–15.4)
HGB BLD-MCNC: 8.9 G/DL (ref 11.5–15.4)
HGB BLD-MCNC: 9.6 G/DL (ref 11.5–15.4)
HOROWITZ INDEX BLDA+IHG-RTO: 60 MM[HG]
IMM GRANULOCYTES # BLD AUTO: 0.17 THOUSAND/UL (ref 0–0.2)
IMM GRANULOCYTES # BLD AUTO: 0.17 THOUSAND/UL (ref 0–0.2)
IMM GRANULOCYTES NFR BLD AUTO: 1 % (ref 0–2)
IMM GRANULOCYTES NFR BLD AUTO: 1 % (ref 0–2)
INR PPP: 4.29 (ref 0.84–1.19)
INTERVENTRICULAR SEPTUM IN DIASTOLE (PARASTERNAL SHORT AXIS VIEW): 1.1 CM
INTERVENTRICULAR SEPTUM: 1.1 CM (ref 0.6–1.1)
LACTATE SERPL-SCNC: 1.5 MMOL/L (ref 0.5–2)
LACTATE SERPL-SCNC: 6.7 MMOL/L (ref 0.5–2)
LEFT INTERNAL DIMENSION IN SYSTOLE: 2.9 CM (ref 2.1–4)
LEFT VENTRICULAR INTERNAL DIMENSION IN DIASTOLE: 3.5 CM (ref 3.5–6)
LEFT VENTRICULAR POSTERIOR WALL IN END DIASTOLE: 1.2 CM
LEFT VENTRICULAR STROKE VOLUME: 21 ML
LVSV (TEICH): 21 ML
LYMPHOCYTES # BLD AUTO: 0.88 THOUSANDS/ÂΜL (ref 0.6–4.47)
LYMPHOCYTES # BLD AUTO: 0.92 THOUSANDS/ÂΜL (ref 0.6–4.47)
LYMPHOCYTES # BLD AUTO: 13 % (ref 14–44)
LYMPHOCYTES # BLD AUTO: 3.5 THOUSAND/UL (ref 0.6–4.47)
LYMPHOCYTES NFR BLD AUTO: 5 % (ref 14–44)
LYMPHOCYTES NFR BLD AUTO: 6 % (ref 14–44)
MAGNESIUM SERPL-MCNC: 1.8 MG/DL (ref 1.9–2.7)
MCH RBC QN AUTO: 25.2 PG (ref 26.8–34.3)
MCH RBC QN AUTO: 25.5 PG (ref 26.8–34.3)
MCH RBC QN AUTO: 25.9 PG (ref 26.8–34.3)
MCHC RBC AUTO-ENTMCNC: 30.7 G/DL (ref 31.4–37.4)
MCHC RBC AUTO-ENTMCNC: 31 G/DL (ref 31.4–37.4)
MCHC RBC AUTO-ENTMCNC: 31.5 G/DL (ref 31.4–37.4)
MCV RBC AUTO: 81 FL (ref 82–98)
MCV RBC AUTO: 81 FL (ref 82–98)
MCV RBC AUTO: 85 FL (ref 82–98)
METAMYELOCYTES NFR BLD MANUAL: 2 % (ref 0–1)
MONOCYTES # BLD AUTO: 0.82 THOUSAND/ÂΜL (ref 0.17–1.22)
MONOCYTES # BLD AUTO: 1.39 THOUSAND/ÂΜL (ref 0.17–1.22)
MONOCYTES # BLD AUTO: 2.15 THOUSAND/UL (ref 0–1.22)
MONOCYTES NFR BLD AUTO: 10 % (ref 4–12)
MONOCYTES NFR BLD AUTO: 5 % (ref 4–12)
MONOCYTES NFR BLD: 8 % (ref 4–12)
MYELOCYTES NFR BLD MANUAL: 1 % (ref 0–1)
NEUTROPHILS # BLD AUTO: 12.01 THOUSANDS/ÂΜL (ref 1.85–7.62)
NEUTROPHILS # BLD AUTO: 14.5 THOUSANDS/ÂΜL (ref 1.85–7.62)
NEUTROPHILS # BLD MANUAL: 20.19 THOUSAND/UL (ref 1.85–7.62)
NEUTS SEG NFR BLD AUTO: 75 % (ref 43–75)
NEUTS SEG NFR BLD AUTO: 83 % (ref 43–75)
NEUTS SEG NFR BLD AUTO: 89 % (ref 43–75)
NRBC BLD AUTO-RTO: 0 /100 WBCS
NRBC BLD AUTO-RTO: 0 /100 WBCS
O2 CT BLDA-SCNC: 18.7 ML/DL (ref 16–23)
OXYHGB MFR BLDA: 99.4 % (ref 94–97)
P AXIS: -1 DEGREES
P AXIS: 0 DEGREES
P AXIS: 65 DEGREES
PCO2 BLDA: 29.7 MM HG (ref 36–44)
PEEP RESPIRATORY: 6 CM[H2O]
PH BLDA: 7.42 [PH] (ref 7.35–7.45)
PHOSPHATE SERPL-MCNC: 5.2 MG/DL (ref 2.3–4.1)
PLATELET # BLD AUTO: 253 THOUSANDS/UL (ref 149–390)
PLATELET # BLD AUTO: 267 THOUSANDS/UL (ref 149–390)
PLATELET # BLD AUTO: 332 THOUSANDS/UL (ref 149–390)
PLATELET BLD QL SMEAR: ADEQUATE
PMV BLD AUTO: 8.7 FL (ref 8.9–12.7)
PMV BLD AUTO: 9 FL (ref 8.9–12.7)
PMV BLD AUTO: 9.2 FL (ref 8.9–12.7)
PO2 BLDA: 223 MM HG (ref 75–129)
POTASSIUM SERPL-SCNC: 2.9 MMOL/L (ref 3.5–5.3)
POTASSIUM SERPL-SCNC: 3 MMOL/L (ref 3.5–5.3)
POTASSIUM SERPL-SCNC: 3.3 MMOL/L (ref 3.5–5.3)
PR INTERVAL: 104 MS
PR INTERVAL: 108 MS
PR INTERVAL: 124 MS
PROT SERPL-MCNC: 5.9 G/DL (ref 6.4–8.4)
PROT SERPL-MCNC: 6 G/DL (ref 6.4–8.4)
PROT SERPL-MCNC: 6.9 G/DL (ref 6.4–8.4)
PROTHROMBIN TIME: 40.8 SECONDS (ref 11.6–14.5)
QRS AXIS: 110 DEGREES
QRS AXIS: 51 DEGREES
QRS AXIS: 56 DEGREES
QRSD INTERVAL: 138 MS
QRSD INTERVAL: 67 MS
QRSD INTERVAL: 78 MS
QT INTERVAL: 288 MS
QT INTERVAL: 332 MS
QT INTERVAL: 363 MS
QTC INTERVAL: 424 MS
QTC INTERVAL: 482 MS
QTC INTERVAL: 538 MS
RBC # BLD AUTO: 3.05 MILLION/UL (ref 3.81–5.12)
RBC # BLD AUTO: 3.43 MILLION/UL (ref 3.81–5.12)
RBC # BLD AUTO: 3.76 MILLION/UL (ref 3.81–5.12)
SL CV LV EF: 55
SL CV PED ECHO LEFT VENTRICLE DIASTOLIC VOLUME (MOD BIPLANE) 2D: 52 ML
SL CV PED ECHO LEFT VENTRICLE SYSTOLIC VOLUME (MOD BIPLANE) 2D: 31 ML
SODIUM SERPL-SCNC: 132 MMOL/L (ref 135–147)
SODIUM SERPL-SCNC: 133 MMOL/L (ref 135–147)
SODIUM SERPL-SCNC: 137 MMOL/L (ref 135–147)
SPECIMEN SOURCE: ABNORMAL
T WAVE AXIS: 104 DEGREES
T WAVE AXIS: 49 DEGREES
T WAVE AXIS: 81 DEGREES
VANCOMYCIN SERPL-MCNC: 11.4 UG/ML (ref 10–20)
VENT AC: 16
VENT- AC: AC
VENTRICULAR RATE: 127 BPM
VENTRICULAR RATE: 130 BPM
VENTRICULAR RATE: 132 BPM
VT SETTING VENT: 370 ML
WBC # BLD AUTO: 14.53 THOUSAND/UL (ref 4.31–10.16)
WBC # BLD AUTO: 16.42 THOUSAND/UL (ref 4.31–10.16)
WBC # BLD AUTO: 26.92 THOUSAND/UL (ref 4.31–10.16)

## 2023-01-18 PROCEDURE — 5A12012 PERFORMANCE OF CARDIAC OUTPUT, SINGLE, MANUAL: ICD-10-PCS | Performed by: INTERNAL MEDICINE

## 2023-01-18 PROCEDURE — 03HY32Z INSERTION OF MONITORING DEVICE INTO UPPER ARTERY, PERCUTANEOUS APPROACH: ICD-10-PCS | Performed by: INTERNAL MEDICINE

## 2023-01-18 PROCEDURE — 0BH18EZ INSERTION OF ENDOTRACHEAL AIRWAY INTO TRACHEA, VIA NATURAL OR ARTIFICIAL OPENING ENDOSCOPIC: ICD-10-PCS | Performed by: INTERNAL MEDICINE

## 2023-01-18 PROCEDURE — 06HM33Z INSERTION OF INFUSION DEVICE INTO RIGHT FEMORAL VEIN, PERCUTANEOUS APPROACH: ICD-10-PCS | Performed by: INTERNAL MEDICINE

## 2023-01-18 PROCEDURE — 4A133B1 MONITORING OF ARTERIAL PRESSURE, PERIPHERAL, PERCUTANEOUS APPROACH: ICD-10-PCS | Performed by: INTERNAL MEDICINE

## 2023-01-18 PROCEDURE — 4A133J1 MONITORING OF ARTERIAL PULSE, PERIPHERAL, PERCUTANEOUS APPROACH: ICD-10-PCS | Performed by: INTERNAL MEDICINE

## 2023-01-18 PROCEDURE — 5A1945Z RESPIRATORY VENTILATION, 24-96 CONSECUTIVE HOURS: ICD-10-PCS | Performed by: INTERNAL MEDICINE

## 2023-01-18 RX ORDER — VANCOMYCIN HYDROCHLORIDE 1 G/200ML
1000 INJECTION, SOLUTION INTRAVENOUS EVERY 12 HOURS
Status: DISCONTINUED | OUTPATIENT
Start: 2023-01-18 | End: 2023-01-18

## 2023-01-18 RX ORDER — LABETALOL HYDROCHLORIDE 5 MG/ML
10 INJECTION, SOLUTION INTRAVENOUS ONCE
Status: COMPLETED | OUTPATIENT
Start: 2023-01-18 | End: 2023-01-18

## 2023-01-18 RX ORDER — ACETAMINOPHEN 650 MG/1
650 SUPPOSITORY RECTAL EVERY 6 HOURS PRN
Status: DISCONTINUED | OUTPATIENT
Start: 2023-01-18 | End: 2023-01-21

## 2023-01-18 RX ORDER — MAGNESIUM SULFATE HEPTAHYDRATE 40 MG/ML
2 INJECTION, SOLUTION INTRAVENOUS ONCE
Status: COMPLETED | OUTPATIENT
Start: 2023-01-18 | End: 2023-01-18

## 2023-01-18 RX ORDER — FENTANYL CITRATE-0.9 % NACL/PF 10 MCG/ML
25 PLASTIC BAG, INJECTION (ML) INTRAVENOUS CONTINUOUS
Status: DISCONTINUED | OUTPATIENT
Start: 2023-01-18 | End: 2023-01-21

## 2023-01-18 RX ORDER — POTASSIUM CHLORIDE 14.9 MG/ML
20 INJECTION INTRAVENOUS ONCE
Status: COMPLETED | OUTPATIENT
Start: 2023-01-18 | End: 2023-01-18

## 2023-01-18 RX ORDER — POTASSIUM CHLORIDE 29.8 MG/ML
40 INJECTION INTRAVENOUS ONCE
Status: COMPLETED | OUTPATIENT
Start: 2023-01-18 | End: 2023-01-18

## 2023-01-18 RX ORDER — SODIUM CHLORIDE, SODIUM GLUCONATE, SODIUM ACETATE, POTASSIUM CHLORIDE, MAGNESIUM CHLORIDE, SODIUM PHOSPHATE, DIBASIC, AND POTASSIUM PHOSPHATE .53; .5; .37; .037; .03; .012; .00082 G/100ML; G/100ML; G/100ML; G/100ML; G/100ML; G/100ML; G/100ML
500 INJECTION, SOLUTION INTRAVENOUS ONCE
Status: COMPLETED | OUTPATIENT
Start: 2023-01-18 | End: 2023-01-18

## 2023-01-18 RX ORDER — FENTANYL CITRATE 50 UG/ML
50 INJECTION, SOLUTION INTRAMUSCULAR; INTRAVENOUS EVERY 2 HOUR PRN
Status: DISCONTINUED | OUTPATIENT
Start: 2023-01-18 | End: 2023-01-21

## 2023-01-18 RX ORDER — PROPOFOL 10 MG/ML
5-50 INJECTION, EMULSION INTRAVENOUS
Status: DISCONTINUED | OUTPATIENT
Start: 2023-01-18 | End: 2023-01-21

## 2023-01-18 RX ORDER — PANTOPRAZOLE SODIUM 40 MG/10ML
40 INJECTION, POWDER, LYOPHILIZED, FOR SOLUTION INTRAVENOUS
Status: DISCONTINUED | OUTPATIENT
Start: 2023-01-18 | End: 2023-01-23

## 2023-01-18 RX ORDER — POTASSIUM CHLORIDE 29.8 MG/ML
40 INJECTION INTRAVENOUS ONCE
Status: COMPLETED | OUTPATIENT
Start: 2023-01-18 | End: 2023-01-19

## 2023-01-18 RX ADMIN — FENTANYL CITRATE 50 MCG: 50 INJECTION INTRAMUSCULAR; INTRAVENOUS at 19:26

## 2023-01-18 RX ADMIN — SODIUM CHLORIDE 10 UNITS/HR: 9 INJECTION, SOLUTION INTRAVENOUS at 10:48

## 2023-01-18 RX ADMIN — LABETALOL HYDROCHLORIDE 10 MG: 5 INJECTION, SOLUTION INTRAVENOUS at 00:17

## 2023-01-18 RX ADMIN — ACETAMINOPHEN 650 MG: 650 SUPPOSITORY RECTAL at 00:28

## 2023-01-18 RX ADMIN — POTASSIUM CHLORIDE 40 MEQ: 29.8 INJECTION, SOLUTION INTRAVENOUS at 11:59

## 2023-01-18 RX ADMIN — Medication 25 MCG/HR: at 08:36

## 2023-01-18 RX ADMIN — POTASSIUM CHLORIDE 20 MEQ: 14.9 INJECTION, SOLUTION INTRAVENOUS at 13:28

## 2023-01-18 RX ADMIN — AMIODARONE HYDROCHLORIDE 0.5 MG/MIN: 50 INJECTION, SOLUTION INTRAVENOUS at 16:44

## 2023-01-18 RX ADMIN — AMIODARONE HYDROCHLORIDE 1 MG/MIN: 50 INJECTION, SOLUTION INTRAVENOUS at 10:36

## 2023-01-18 RX ADMIN — MEROPENEM 1000 MG: 1 INJECTION, POWDER, FOR SOLUTION INTRAVENOUS at 09:54

## 2023-01-18 RX ADMIN — SODIUM CHLORIDE, SODIUM GLUCONATE, SODIUM ACETATE, POTASSIUM CHLORIDE, MAGNESIUM CHLORIDE, SODIUM PHOSPHATE, DIBASIC, AND POTASSIUM PHOSPHATE 500 ML: .53; .5; .37; .037; .03; .012; .00082 INJECTION, SOLUTION INTRAVENOUS at 22:59

## 2023-01-18 RX ADMIN — PROPOFOL 50 MCG/KG/MIN: 10 INJECTION, EMULSION INTRAVENOUS at 20:28

## 2023-01-18 RX ADMIN — MEROPENEM 1000 MG: 1 INJECTION, POWDER, FOR SOLUTION INTRAVENOUS at 18:05

## 2023-01-18 RX ADMIN — PROPOFOL 10 MCG/KG/MIN: 10 INJECTION, EMULSION INTRAVENOUS at 11:59

## 2023-01-18 RX ADMIN — ACETAMINOPHEN 650 MG: 650 SUPPOSITORY RECTAL at 21:20

## 2023-01-18 RX ADMIN — PIPERACILLIN SODIUM AND TAZOBACTAM SODIUM 3.38 G: 36; 4.5 INJECTION, POWDER, LYOPHILIZED, FOR SOLUTION INTRAVENOUS at 02:57

## 2023-01-18 RX ADMIN — MAGNESIUM SULFATE HEPTAHYDRATE 2 G: 40 INJECTION, SOLUTION INTRAVENOUS at 12:02

## 2023-01-18 RX ADMIN — FENTANYL CITRATE 50 MCG: 50 INJECTION INTRAMUSCULAR; INTRAVENOUS at 10:59

## 2023-01-18 RX ADMIN — NOREPINEPHRINE BITARTRATE 4 MCG: 1 INJECTION, SOLUTION, CONCENTRATE INTRAVENOUS at 09:22

## 2023-01-18 RX ADMIN — AMIODARONE HYDROCHLORIDE 150 MG: 50 INJECTION, SOLUTION INTRAVENOUS at 10:25

## 2023-01-18 RX ADMIN — SODIUM CHLORIDE, SODIUM LACTATE, POTASSIUM CHLORIDE, AND CALCIUM CHLORIDE 500 ML: .6; .31; .03; .02 INJECTION, SOLUTION INTRAVENOUS at 11:23

## 2023-01-18 RX ADMIN — PANTOPRAZOLE SODIUM 40 MG: 40 INJECTION, POWDER, FOR SOLUTION INTRAVENOUS at 12:16

## 2023-01-18 RX ADMIN — VANCOMYCIN HYDROCHLORIDE 1000 MG: 1 INJECTION, SOLUTION INTRAVENOUS at 12:14

## 2023-01-18 RX ADMIN — ACETAMINOPHEN 650 MG: 325 TABLET ORAL at 09:54

## 2023-01-18 RX ADMIN — SODIUM CHLORIDE, SODIUM GLUCONATE, SODIUM ACETATE, POTASSIUM CHLORIDE, MAGNESIUM CHLORIDE, SODIUM PHOSPHATE, DIBASIC, AND POTASSIUM PHOSPHATE 100 ML/HR: .53; .5; .37; .037; .03; .012; .00082 INJECTION, SOLUTION INTRAVENOUS at 23:00

## 2023-01-18 NOTE — CONSULTS
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 (HCC)    Hypokalemia    PAD (peripheral artery disease) (HCC)    HIT (heparin-induced thrombocytopenia)    History of CVA (cerebrovascular accident)    Metabolic encephalopathy    Fall    Hyponatremia    Cardiac arrest (HCC)    Metabolic acidosis    Troponin level elevated    Hypomagnesemia    #Cardiac arrest  Shockable rhythm  No STEMI in postarrest EKG  No chest pain leading up to the event as per the nursing aide  Patient does have troponin elevation which could be related to cardiac injury from being in V  fib and shock  Echocardiogram was reviewed by me  It is a technically difficult study with poor quality images despite administration of Definity  No obvious wall motion abnormalities were seen however the possibility cannot be ruled out  Patient does have a coronary calcification and significant risk factors for coronary artery disease  Patient had V  fib rhythm it is likely from type II MI in the setting of severe sepsis versus possible NSTEMI  Patient will benefit from cardiac catheterization at some point however due to bacteremia, active sepsis and infection of her access site past admission harms of emergent cath likely outweigh the benefit  #Uncontrolled diabetes  #Severe peripheral artery disease status post bilateral AKA  #Urosepsis  #Bacteremia    Plan:  -Continue Plavix 75 mg once a day  -Patient is currently supratherapeutic in terms of her INR    Can consider heparin drip when the INR comes down   -Keep repleting potassium and magnesium with a goal of K more than 4 and mag more than 2  -Restart atorvastatin 40 mg once a day if transaminitis is confirmed to be downtrending  -Can consider LEON if she has persistent bacteremia and if recommended by ID  -Continue amiodarone drip  -We will defer cardiac catheterization for now  Emergent indication for cardiac cath will include hemodynamic collapse from cardiac etiology (drop in EF, low mixed venous O2 saturation) and breakthrough arrhythmias on amiodarone      History of Present Illness   Physician Requesting Consult: Sophie Marrero*  Reason for Consult / Principal Problem: Cardiac arrest  HPI: Gill Bowen is a 65y o  year old female with PMH of uncontrolled diabetes mellitus, severe peripheral artery disease with recent bilateral above-the-knee amputations (the admission was complicated by fungal infection of the Perclose site, urinary tract infection, heparin-induced thrombocytopenia and stroke) and came to the hospital after a fall from her wheelchair with facial trauma  In the emergency room she was found to be septic with fevers, tachycardia and hypertension  Her blood culture showed bacteremia likely from urinary source  Overnight she was febrile and hypertensive  As per my discussion with the nurse today she was seen by her nursing aide this morning according to him she was doing fine without any symptoms  Few minutes later while passing by nurse found her gasping for air  She became unconscious with agonal breathing  She did not have a pulse so ACLS was initiated  She had V  fib on the monitor as per documentation and she required 1 dose of epi and 1 shock following which she regained ROSC  She was transferred to the ICU and as per the nurse she was following commands  On my examination of the patient she was intubated and sedated on propofol and fentanyl and was not following commands        Consults    Review of Systems:  Review of Systems  All negative except as mentioned in the HPI    Historical Information   Past Medical History:   Diagnosis Date   • Anxiety    • Depression    • Diabetes ([facility] [address] )    • [facility]    • Esophageal reflux     28 APR 2015 RESOLVED   • Hyperlipidemia    • Hypertension    • Low back pain     sciatica   • Pneumonia 2019   • Stroke ([facility] [address] ) 12/2015    small vessel, L frontal corona radiata  Rx asa 81, Lipitor 40, risk modification   • Vitamin D deficiency      Past Surgical History:   Procedure Laterality Date   • AMPUTATION ABOVE KNEE (AKA) Right 12/1/2022    Procedure: (AKA), PSB  BKA;  Surgeon: Raquel Cage DO;  Location: [facility];  Service: Vascular   • ARTERIOGRAM Right 11/7/2022    Procedure: -Right lower extremity angiogram -Right CFA balloon angioplasty with [id number]  [employer]  -Right CFA DCB with 6x40 Bard Lutonix -Right CFA atherectomy with Jetstream and distal embolization protection with 7mm Spider FX -Right SFA/Pop angioplasty with 4x40 Chololate balloon -Right SFA/Pop DCB with 5x150 Bard Lutonix -Right SFA stent with 6x80 W W  ColoWrap x2 -R   • COLONOSCOPY     • IR AORTAGRAM WITH RUN-OFF  10/31/2022   • IR LOWER EXTREMITY ANGIOGRAM  11/10/2022   • IR LOWER EXTREMITY ANGIOGRAM  11/7/2022   • RI AMPUTATION THIGH THROUGH FEMUR ANY LEVEL Left 11/23/2022    Procedure: (AKA); Surgeon: Cheyenne Zamora DO;  Location: [facility];  Service: Vascular   • RI NEGATIVE PRESSURE WOUND THERAPY DME </= 50 SQ CM Bilateral 12/19/2022    Procedure: Right above knee amputation washout; vac change; Left above knee amputation debridement; vac placement;  Surgeon: Raquel Cage DO;  Location: [facility];  Service: Vascular   • WOUND DEBRIDEMENT Right 12/16/2022    Procedure: AKA STUMP WASHOUT, Left AKA Staple removal  application of wound vac to Right AKA;   Surgeon: Magdalena Montague MD;  Location: [facility];  Service: Vascular     Social History     Substance and Sexual Activity   Alcohol Use Never    Comment: OCCASIONAL ALCOHOL USE IN ALL SCRIPTS     Social History     Substance and Sexual Activity   Drug Use No     Social History     Tobacco Use   Smoking Status Former   • Types: Cigarettes   • Quit date:    • Years since quittin 0   Smokeless Tobacco Never     Family History: non-contributory    Meds/Allergies   all current active meds have been reviewed  Allergies   Allergen Reactions   • Heparin Other (See Comments)     History of HIT       Objective   Vitals: Blood pressure 94/59, pulse 92, temperature (!) 103 3 °F (39 6 °C), temperature source Rectal, resp   rate 22, height 5' 4" (1 626 m), weight 72 1 kg (159 lb), SpO2 100 %, not currently breastfeeding , Body mass index is 27 29 kg/m² ,   Orthostatic Blood Pressures    Flowsheet Row Most Recent Value   Blood Pressure 94/59 filed at 2023 1100   Patient Position - Orthostatic VS Lying filed at 2023 0729            Intake/Output Summary (Last 24 hours) at 2023 1447  Last data filed at 2023 1430  Gross per 24 hour   Intake 1100 ml   Output 3050 ml   Net -1950 ml       Invasive Devices     Central Venous Catheter Line  Duration           CVC Central Lines 23 <1 day          Peripheral Intravenous Line  Duration           Peripheral IV 23 Left Antecubital <1 day          Arterial Line  Duration           Arterial Line 23 Radial <1 day          Drain  Duration           NG/OG/Enteral Tube Orogastric Left mouth <1 day    Urethral Catheter Temperature probe 16 Fr  <1 day          Airway  Duration           ETT  Cuffed 7 5 mm <1 day                    Physical Exam:  Physical Exam  General: Intubated and sedated, facial trauma noted  neck: No JVD  Cardiac: normal S1, S2, no m/r/g  Respiratory: normal breath sounds, no wheezes or crackles  Abdomen: soft and non-tender  Extremities: warm, no cyanosis, no lower extremity edema      Lab Results:     Lab Results   Component Value Date    CKTOTAL 2,728 (H) 2022    CKTOTAL 1,911 (H) 11/09/2022    CKTOTAL 53 01/31/2020    CKMB 12 0 (H) 11/14/2022    CKMB 39 3 (H) 11/09/2022    CKMBINDEX <1 0 11/14/2022    CKMBINDEX 2 1 11/09/2022    TROPONINI <0 02 01/31/2020    TROPONINI <0 02 02/25/2018    TROPONINI <0 02 02/25/2018       Lab Results   Component Value Date    CALCIUM 7 1 (L) 01/18/2023    K 3 0 (L) 01/18/2023    CO2 16 (L) 01/18/2023    CL 97 01/18/2023    BUN 15 01/18/2023    CREATININE 0 82 01/18/2023       Lab Results   Component Value Date    WBC 26 92 (H) 01/18/2023    HGB 8 9 (L) 01/18/2023    HCT 29 0 (L) 01/18/2023    MCV 85 01/18/2023     01/18/2023       No results found for: CHOL  Lab Results   Component Value Date    HDL 28 (L) 11/14/2022    HDL 48 08/12/2021    HDL 50 11/17/2020     Lab Results   Component Value Date    LDLCALC 55 11/14/2022    LDLCALC 122 (H) 08/12/2021    [address]  11/17/2020      Comment:      Calculated LDL invalid, triglycerides >400 mg/dl  This screening LDL is a calculated result  It does not have the accuracy of the Direct Measured LDL in the monitoring of patients with hyperlipidemia and/or statin therapy  Direct Measure LDL (MKV550) must be ordered separately in these patients  Lab Results   Component Value Date    TRIG 93 11/14/2022    TRIG 275 (H) 08/12/2021    TRIG 589 (H) 11/17/2020       Lab Results   Component Value Date     (H) 01/18/2023     (H) 01/18/2023       Results from last 7 days   Lab Units 01/18/23  0530   INR  4 29*         Imaging: I have personally reviewed pertinent reports

## 2023-01-18 NOTE — WOUND OSTOMY CARE
Consult Note - Wound   Bronwyn Raman 65 y o  female MRN: [id number]  Unit/Bed#: ICU 15 Encounter: [id number]      Patient seen at request of nursing leadership during rounds for right thigh wounds  History and Present Illness:  65year old female presented to the hospital with altered mental status and fall  Patient's history significant for PAD, CVA, HIT, DM, bilateral AKA  Assessment Findings:   Patient is intubated, sedated, dependent for all cares and repositioning  On low air-loss mattress  Pa catheter in place  Presumed bowel incontinence  Nutrition team following, currently NPO  Left stump incision is healed  Buttocks/sacrum not assessed at this time--intact per nursing team   Ecchymosis to chin/face  1   Right anterior thigh traumatic wound--dry, black eschar with surrounding ecchymosis  No induration or fluctuance at this time  2   Right AKA incision--remains open, pink, full thickness granulation tissue  Small bloody drainage from distal edge  Michaela-wound intact with scattered irregular areas of hyperpigmentation and erythema (slow to melissa in some areas)  3   Right lateral thigh--patient with vascular wounds to this area on previous admission  Area is currently dry, with thin scabbing  Michaela-wound intact with hyperpigmentation, blanchable  See flowsheet for wound details  No evidence of wound infection at this time  Wound Care Plan:   1-While pa catheter is in place:  Apply Allevyn Life foam dressing to midline sacrum (small sacral) for prevention  Scotty with P   Peel back at least daily for skin assessment and re-apply  Change dressing every 3 days and PRN  2-Low air-loss mattress--place order for P500 mattress if transferred out of ICU  3-Offloading air cushion in chair if/when out of bed    4-Moisturize skin daily with skin nourishing cream   5-Turn/reposition every 2 hours while in bed using positioning wedges; and weight shift frequently while in chair for pressure re-distribution on skin  6-Eschar to anterior knee--apply 3m no sting skin barrier film (skin prep) daily, may leave open to air  7-Right stump incision and lateral thigh wound--cleanse with normal saline, pat dry  Apply Dermagran (cut to fit size of wound)  Cover with Allevyn Life foam dressings  Change dressings every other day  Wound care team to follow  Plan of care reviewed with primary RN  Wound 12/01/22 Surgical Thigh Right (Active)   Wound Image   01/18/23 1349   Wound Description Pink;Granulation tissue 01/18/23 1349   Michaela-wound Assessment Intact 01/18/23 1349   Wound Length (cm) 1 4 cm 01/18/23 1349   Wound Width (cm) 6 cm 01/18/23 1349   Wound Depth (cm) 0 2 cm 01/18/23 1349   Wound Surface Area (cm^2) 8 4 cm^2 01/18/23 1349   Wound Volume (cm^3) 1 68 cm^3 01/18/23 1349   Calculated Wound Volume (cm^3) 1 68 cm^3 01/18/23 1349   Drainage Amount Small 01/18/23 1349   Drainage Description Bloody 01/18/23 1349   Non-staged Wound Description Full thickness 01/18/23 1349   Treatments Cleansed 01/18/23 1349   Dressing Dermagran gauze; Foam, Silicon (eg  Allevyn, etc) 01/18/23 1349   Dressing Changed New 01/18/23 1349   Patient Tolerance Tolerated well 01/18/23 1349   Dressing Status Clean;Dry; Intact 01/18/23 1349       Wound 01/18/23 Traumatic Thigh Anterior;Right (Active)   Wound Image   01/18/23 1348   Wound Description Black 01/18/23 1348   Michaela-wound Assessment Purple;Edema 01/18/23 1348   Wound Length (cm) 2 cm 01/18/23 1348   Wound Width (cm) 0 5 cm 01/18/23 1348   Wound Depth (cm) 0 cm 01/18/23 1348   Wound Surface Area (cm^2) 1 cm^2 01/18/23 1348   Wound Volume (cm^3) 0 cm^3 01/18/23 1348   Calculated Wound Volume (cm^3) 0 cm^3 01/18/23 1348   Drainage Amount None 01/18/23 1348   Dressing Open to air 01/18/23 1348       Wound 01/18/23 Thigh Right;Lateral (Active)   Wound Image   01/18/23 1350   Wound Description Dry;Pink; Tan 01/18/23 1350   Michaela-wound Assessment Hyperpigmented;Erythema 01/18/23 1350   Wound Length (cm) 2 cm 01/18/23 1350   Wound Width (cm) 1 cm 01/18/23 1350   Wound Depth (cm) 0 1 cm 01/18/23 1350   Wound Surface Area (cm^2) 2 cm^2 01/18/23 1350   Wound Volume (cm^3) 0 2 cm^3 01/18/23 1350   Calculated Wound Volume (cm^3) 0 2 cm^3 01/18/23 1350   Drainage Amount None 01/18/23 1350   Treatments Cleansed 01/18/23 1350   Dressing Foam, Silicon (eg  Allevyn, etc) 01/18/23 1350   Dressing Changed New 01/18/23 1350   Patient Tolerance Tolerated well 01/18/23 1350   Dressing Status Clean;Dry; Intact 01/18/23 Carolina U  50  BSN, RN, [employer]

## 2023-01-18 NOTE — ASSESSMENT & PLAN NOTE
Lab Results   Component Value Date    HGBA1C 9 8 (H) 10/25/2022       Recent Labs     01/15/23  1657   POCGLU 101       Blood Sugar Average: Last 72 hrs:  Patient is minimally communicative/encephalopathic in setting of acute illness, sepsis  Hold scheduled insulin, proceed with sliding scale

## 2023-01-18 NOTE — ASSESSMENT & PLAN NOTE
· The etiology is not entirely know  DDX include primary cardiac event/ arrhythmia given her history of significant vascular disease, PE, Aspiration- PE is thought less likely given elevated INR  · We will trend her troponins  · We will check an EKG now- initial one following the arrest revealed No acute ST segment elevation but there is some evidence of up-sloping of her St segment suggesting ischemia  · We will get an ECHO and ask cardiology to see her    · We will begin her on amiodarone for now  · Further management per cardiology's recommendation

## 2023-01-18 NOTE — PROGRESS NOTES
[facility]  Progress Note/ ICU accept note   Katarzyna Garduno 1960, 65 y o  female MRN: [id number]  Unit/Bed#: ICU 12 Encounter: [id number]  Primary Care Provider: ALEX Macias   Date and time admitted to hospital: 1/17/2023  3:48 PM    Cardiac arrest [facility])  Assessment & Plan  · The etiology is not entirely know  DDX include primary cardiac event/ arrhythmia given her history of significant vascular disease, PE, Aspiration- PE is thought less likely given elevated INR  · We will trend her troponins  · We will check an EKG now- initial one following the arrest revealed No acute ST segment elevation but there is some evidence of up-sloping of her St segment suggesting ischemia  · We will get an ECHO and ask cardiology to see her  · We will begin her on amiodarone for now  · Further management per cardiology's recommendation    Troponin level elevated  Assessment & Plan  · Her initial troponin was elevated to > 1000  · May be demand in the setting of sepsis, but a primary ACS may also be present given her generalized vascular issues  · Cardiology has been consulted  · ECHO is pending  · We will trend her troponins    * Sepsis due to urinary tract infection [facility])  Assessment & Plan  · The patient was admitted with sepsis on 1/17  On the morning of 1/18 was found unresponsive and without a pulse  ROSC was obtained following 1 amp epinephrine and defibrillation with 200 joules  · We will change her antibiotics to meropenum based on her sensitivities  · She is noted to have polymicrobial bacteremia with enterococcus and enterobacter   · We will continue to monitor her fever curve, procal and WBC level  · Her arrest may be related to ischemia in the setting of infection but given her significant vascular issues and coronary calcifications seen on CT, a primary cardiac event is also considered likely     · A new infection is felt less likely to be a contributing factor    Metabolic acidosis  Assessment & Plan  · In the setting of cardiac arrest, her lactic acid was elevated  · We will trend this  · We will continue to monitor her electrolytes and acid/base balance    Hypomagnesemia  Assessment & Plan  · Her mag was 1 5 then 1 8  · We will replete these with a goal to maintain the level > 2    Hyponatremia  Assessment & Plan  · This has been stable since admission at 132  · Etiology, possibly hypovolemic due to sepsis but was resuscitated overnight and had BP ranges that were on the higher end   · She is on seroquel, zoloft and  lisinopril which can all contribute to SIADH development, particularly the zoloft  · We will continue to monitor her sodium level and hold her zoloft, seroquel and lisinopril for now    Hypokalemia  Assessment & Plan  · Her potassium was 2 9 then 3 post arrest   · Her hypokalemia could be a contributing factor  · We will replete her potassium- our goal will be to maintain the level > 4    Acute respiratory failure with hypoxia ([address] )  Assessment & Plan  · She was intubated following the cardiac arrest  · We will continue vent support for now  · Our goal will be to maintain the oxygen saturation > 90%    Esophageal reflux  Assessment & Plan  · We will place her on protonix for now    ----------------------------------------------------------------------------------------  HPI/24hr events: The patient was a CODE blue this AM- see code documentation for specifics related to this event  She was given 1 mg of epinephrine and defibrillated with 200 joules and had ROSC   She was intubated and transferred to the ICU    Patient appropriate for transfer out of the ICU today?: No  Disposition: Admit to Critical Care   Code Status: Level 1 - Full Code  ---------------------------------------------------------------------------------------  SUBJECTIVE  Intubated and sedated    Review of Systems  Review of systems was unable to be performed secondary to intubated and sedated  ---------------------------------------------------------------------------------------  OBJECTIVE    Vitals   Vitals:    23 0930 23 1100 23 1116 23 1122   BP: 94/59 94/59     BP Location:       Pulse: (!) 124 (!) 125     Resp: (!) 29      Temp:       TempSrc:       SpO2: 100%  100% 100%   Weight:  72 1 kg (159 lb)     Height:  5' 4" (1 626 m)       Temp (24hrs), Av 9 °F (38 8 °C), Min:97 5 °F (36 4 °C), Max:104 2 °F (40 1 °C)  Current: Temperature: (!) 103 3 °F (39 6 °C)  Arterial Line BP: 136/46  Arterial Line MAP (mmHg): 70 mmHg    Respiratory:  SpO2: SpO2: 100 %, SpO2 Activity: SpO2 Activity: At Rest       Invasive/non-invasive ventilation settings   Respiratory    Lab Data (Last 4 hours)       1036            pH, Arterial       7 421             pCO2, Arterial       29 7             pO2, Arterial       223 0             HCO3, Arterial       18 9             Base Excess, Arterial       -4 4                  O2/Vent Data     None                Physical Exam  Constitutional:       Appearance: She is obese  She is ill-appearing  HENT:      Head: Normocephalic  Mouth/Throat:      Mouth: Mucous membranes are dry  Eyes:      Pupils: Pupils are equal, round, and reactive to light  Cardiovascular:      Rate and Rhythm: Normal rate  Pulmonary:      Effort: Pulmonary effort is normal       Breath sounds: Rales present  Abdominal:      General: There is distension  Tenderness: There is no abdominal tenderness  Musculoskeletal:      Cervical back: Neck supple  Comments: Bilateral AKA   Skin:     General: Skin is warm and dry  Coloration: Skin is pale  Neurological:      Comments: Intubated, initially with some nystagmus post arrest  This has improved   She is noted to  her extremities and follow some simple commands             Laboratory and Diagnostics:  Results from last 7 days   Lab Units 23  0832 23  0530 23  2434 WBC Thousand/uL 26 92* 16 42* 16 48*   HEMOGLOBIN g/dL 8 9* 9 6* 9 7*   HEMATOCRIT % 29 0* 30 5* 31 2*   PLATELETS Thousands/uL 332 267 269   NEUTROS PCT %  --  89* 88*   MONOS PCT %  --  5 6   MONO PCT % 8  --   --      Results from last 7 days   Lab Units 01/18/23  0832 01/18/23  0530 01/17/23  1605   SODIUM mmol/L 132* 133* 132*   POTASSIUM mmol/L 3 0* 2 9* 3 5   CHLORIDE mmol/L 97 96 98   CO2 mmol/L 16* 21 22   ANION GAP mmol/L 19* 16* 12   BUN mg/dL 15 13 20   CREATININE mg/dL 0 82 0 68 0 82   CALCIUM mg/dL 7 1* 7 9* 8 4   GLUCOSE RANDOM mg/dL 351* 210* 95   ALT U/L 101* 10 9   AST U/L 174* 20 15   ALK PHOS U/L 132* 140* 131*   ALBUMIN g/dL 2 7* 3 1* 3 2*   TOTAL BILIRUBIN mg/dL 0 49 0 52 0 37     Results from last 7 days   Lab Units 01/18/23  0832 01/17/23  1605   MAGNESIUM mg/dL 1 8* 1 5*   PHOSPHORUS mg/dL 5 2*  --       Results from last 7 days   Lab Units 01/18/23  0530 01/17/23  1605   INR  4 29* 2 96*   PTT seconds  --  57*          Results from last 7 days   Lab Units 01/18/23  1050 01/18/23  0834 01/17/23  1605   LACTIC ACID mmol/L 1 5 6 7* 1 1     ABG:  Results from last 7 days   Lab Units 01/18/23  1036   PH ART  7 421   PCO2 ART mm Hg 29 7*   PO2 ART mm Hg 223 0*   HCO3 ART mmol/L 18 9*   BASE EXC ART mmol/L -4 4   ABG SOURCE  Line, Arterial     VBG:  Results from last 7 days   Lab Units 01/18/23  1036 01/17/23  1605   PH JORDEN   --  7 488*   PCO2 JORDEN mm Hg  --  29 3*   PO2 JORDEN mm Hg  --  54 0*   HCO3 JORDEN mmol/L  --  21 7*   BASE EXC JORDEN mmol/L  --  -0 9   ABG SOURCE  Line, Arterial  --      Results from last 7 days   Lab Units 01/17/23  2116 01/17/23  1605   PROCALCITONIN ng/ml 1 37* 0 27*       Micro  Results from last 7 days   Lab Units 01/17/23  1606 01/17/23  1553   GRAM STAIN RESULT  Gram negative rods*  Gram positive cocci in pairs and chains* Gram positive cocci in pairs and chains*       EKG: ST  Imaging: I have personally reviewed pertinent reports     and I have personally reviewed pertinent films in PACS    Intake and Output  I/O       01/16 0701  01/17 0700 01/17 0701  01/18 0700 01/18 0701  01/19 0700    IV Piggyback  150 100    Total Intake(mL/kg)  150 100 (1 4)    Urine (mL/kg/hr)  1700 1350 (3 5)    Total Output  1700 1350    Net  -1550 -1250                 Height and Weights   Height: 5' 4" (162 6 cm)  IBW (Ideal Body Weight): 54 7 kg  Body mass index is 27 29 kg/m²  Weight (last 2 days)     Date/Time Weight    01/18/23 1100 72 1 (159)            Nutrition       Diet Orders   (From admission, onward)             Start     Ordered    01/18/23 0258  Diet NPO  Diet effective now        References:    Nutrtion Support Algorithm Enteral vs  Parenteral   Question Answer Comment   Diet Type NPO    RD to adjust diet per protocol?  Yes        01/18/23 0257                  Active Medications  Scheduled Meds:  Current Facility-Administered Medications   Medication Dose Route Frequency Provider Last Rate   • acetaminophen  650 mg Rectal Q6H PRN Susan Alcantara DO     • acetaminophen  650 mg Oral Q6H PRN Wan Alcantara DO     • amiodarone  1 mg/min Intravenous Continuous ALEX Cox 1 mg/min (01/18/23 1036)    Followed by   • amiodarone  0 5 mg/min Intravenous Continuous ALEX Cox     • buPROPion  150 mg Oral Daily Wan Alcantara DO     • clopidogrel  75 mg Oral Daily Wan Alcantara DO     • clotrimazole-betamethasone   Topical BID Wan Alcantara DO     • fentaNYL  50 mcg/hr Intravenous Continuous Wan Alcantara DO 50 mcg/hr (01/18/23 1006)   • fentanyl citrate (PF)  50 mcg Intravenous Q2H PRN Wan Alcantara DO     • fluocinonide   Topical BID Wan Alcantara DO     • insulin regular (HumuLIN R,NovoLIN R) infusion  0 3-21 Units/hr Intravenous Titrated ALEX Cox 10 Units/hr (01/18/23 1048)   • lactated ringers  500 mL Intravenous Once Wan Alcantara  mL (01/18/23 1123)   • magnesium sulfate  2 g Intravenous Once [address], DO • meropenem  1,000 mg Intravenous Q8H Wan Alcantara, DO Stopped (01/18/23 1100)   • multi-electrolyte  100 mL/hr Intravenous Continuous Wan Alcantara,  mL/hr (01/18/23 0258)   • norepinephrine  1-30 mcg/min Intravenous Titrated Ana Deras MD Stopped (01/18/23 1127)   • pantoprazole  40 mg Intravenous Q24H [address] ALEX Portillo     • perflutren lipid microsphere  0 4 mL/min Intravenous Once in imaging ALEX Santiago     • potassium chloride  40 mEq Intravenous Once Maurita Signs A Dirico, DO 40 mEq (01/18/23 1159)    Followed by   • potassium chloride  20 mEq Intravenous Once Maurita Signs A Dirico, DO     • propofol  5-50 mcg/kg/min Intravenous Titrated Wan Alcantara, DO 10 mcg/kg/min (01/18/23 1159)   • sertraline  100 mg Oral Daily Wan Alcantara, DO     • vancomycin  1,000 mg Intravenous Q12H Wan Alcantara, DO 1,000 mg (01/18/23 1214)     Continuous Infusions:  amiodarone, 1 mg/min, Last Rate: 1 mg/min (01/18/23 1036)   Followed by  amiodarone, 0 5 mg/min  fentaNYL, 50 mcg/hr, Last Rate: 50 mcg/hr (01/18/23 1006)  insulin regular (HumuLIN R,NovoLIN R) infusion, 0 3-21 Units/hr, Last Rate: 10 Units/hr (01/18/23 1048)  multi-electrolyte, 100 mL/hr, Last Rate: 100 mL/hr (01/18/23 0258)  norepinephrine, 1-30 mcg/min, Last Rate: Stopped (01/18/23 1127)  propofol, 5-50 mcg/kg/min, Last Rate: 10 mcg/kg/min (01/18/23 1159)      PRN Meds:   acetaminophen, 650 mg, Q6H PRN  acetaminophen, 650 mg, Q6H PRN  fentanyl citrate (PF), 50 mcg, Q2H PRN  perflutren lipid microsphere, 0 4 mL/min, Once in imaging        Invasive Devices Review  Invasive Devices     Peripheral Intravenous Line  Duration           Peripheral IV 01/17/23 Left Antecubital <1 day    Peripheral IV 01/18/23 Distal;Dorsal (posterior); Left Forearm <1 day          Arterial Line  Duration           Arterial Line 01/18/23 Radial <1 day          Drain  Duration           NG/OG/Enteral Tube Orogastric Left mouth <1 day    Urethral Catheter Temperature probe 16 Fr  <1 day          Airway  Duration           ETT  Cuffed 7 5 mm <1 day                Rationale for remaining devices:   ---------------------------------------------------------------------------------------  Advance Directive and Living Will:      Power of : Yes  POLST:    ---------------------------------------------------------------------------------------  Care Time Delivered:         ALEX De Leon      Portions of the record may have been created with voice recognition software  Occasional wrong word or "sound a like" substitutions may have occurred due to the inherent limitations of voice recognition software    Read the chart carefully and recognize, using context, where substitutions have occurred

## 2023-01-18 NOTE — PROGRESS NOTES
Agata Gayle is a 65 y o  female who is currently ordered Vancomycin IV with management by the Pharmacy Consult service  Relevant clinical data and objective / subjective history reviewed  Vancomycin Assessment:  Indication and Goal AUC/Trough: Other, sepsis, -600, trough 15-20         2  Micro:         Urine culture: In process        Blood culture: In process         3  Renal Function:  SCr: 0 82mg/dL  CrCl: 70 2 mL/min  Renal replacement: Not on dialysis  Days of Therapy: 1  Current Dose: 1750mg IV as a loading dose  Vancomycin Plan:  New Dosinmg IV every 24 hours  Estimated AUC: 432 mcg*hr/mL  Estimated Trough: 11 3 mcg/mL  Next Level: random level 23 at 0600  Renal Function Monitoring: Daily BMP and [place] will continue to follow closely for s/sx of nephrotoxicity, infusion reactions and appropriateness of therapy  BMP and CBC will be ordered per protocol  We will continue to follow the patient’s culture results and clinical progress daily      Dominick Giron, Pharmacist

## 2023-01-18 NOTE — PROCEDURES
Central Line Insertion    Date/Time: 1/18/2023 12:57 PM  Performed by: ALEX Manrique  Authorized by: ALEX Manrique     Consent:     Consent obtained:  Emergent situation    Risks discussed:  Bleeding and infection  Universal protocol:     Immediately prior to procedure, a time out was called: yes      Patient identity confirmed:  Arm band  Pre-procedure details:     Hand hygiene: Hand hygiene performed prior to insertion      Skin preparation:  2% chlorhexidine    Skin preparation agent: Skin preparation agent completely dried prior to procedure    Indications:     Central line indications: medications requiring central line    Anesthesia (see MAR for exact dosages): Anesthesia method:  None  Procedure details:     Location:  Right femoral    Vessel type: vein      Laterality:  Right    Patient position:  Flat    Catheter type:  Triple lumen    Landmarks identified: yes      Ultrasound guidance: no      Number of attempts:  1    Successful placement: yes    Post-procedure details:     Post-procedure:  Line sutured    Assessment:  Blood return through all ports    Patient tolerance of procedure: Tolerated well, no immediate complications  Comments: Th line was placed sterilely but during a CODE blue situation

## 2023-01-18 NOTE — PROGRESS NOTES
Pastoral Care Progress Note    2023  Patient: Wayne Pardo : 1960  Admission Date & Time: 2023 1548  MRN: [id number] [facility]: [id number]      Patient coded this morning,visited with daughter and other friends of patient provided listening support, and pastoral care presence  Will continue to follow patient and support family

## 2023-01-18 NOTE — ASSESSMENT & PLAN NOTE
· She was intubated following the cardiac arrest  · We will continue vent support for now  · Our goal will be to maintain the oxygen saturation > 90%

## 2023-01-18 NOTE — ASSESSMENT & PLAN NOTE
· This has been stable since admission at 132    · Etiology, possibly hypovolemic due to sepsis but was resuscitated overnight and had BP ranges that were on the higher end   · She is on seroquel, zoloft and  lisinopril which can all contribute to SIADH development, particularly the zoloft  · We will continue to monitor her sodium level and hold her zoloft, seroquel and lisinopril for now

## 2023-01-18 NOTE — PLAN OF CARE
Problem: MOBILITY - ADULT  Goal: Maintain or return to baseline ADL function  Description: INTERVENTIONS:  -  Assess patient's ability to carry out ADLs; assess patient's baseline for ADL function and identify physical deficits which impact ability to perform ADLs (bathing, care of mouth/teeth, toileting, grooming, dressing, etc )  - Assess/evaluate cause of self-care deficits   - Assess range of motion  - Assess patient's mobility; develop plan if impaired  - Assess patient's need for assistive devices and provide as appropriate  - Encourage maximum independence but intervene and supervise when necessary  - Involve family in performance of ADLs  - Assess for home care needs following discharge   - Consider OT consult to assist with ADL evaluation and planning for discharge  - Provide patient education as appropriate  1/18/2023 1404 by Hitesh Saunders RN  Outcome: Progressing  1/18/2023 1404 by Hitesh Saunders RN  Outcome: Progressing  Goal: Maintains/Returns to pre admission functional level  Description: INTERVENTIONS:  - Perform BMAT or MOVE assessment daily    - Set and communicate daily mobility goal to care team and patient/family/caregiver     - Collaborate with rehabilitation services on mobility goals if consulted  - Out of bed for toileting  - Record patient progress and toleration of activity level   1/18/2023 1404 by Hitesh Saunders RN  Outcome: Progressing  1/18/2023 1404 by Hitesh Saunders RN  Outcome: Progressing     Problem: Prexisting or High Potential for Compromised Skin Integrity  Goal: Skin integrity is maintained or improved  Description: INTERVENTIONS:  - Identify patients at risk for skin breakdown  - Assess and monitor skin integrity  - Assess and monitor nutrition and hydration status  - Monitor labs   - Assess for incontinence   - Turn and reposition patient  - Assist with mobility/ambulation  - Relieve pressure over bony prominences  - Avoid friction and shearing  - Provide appropriate hygiene as needed including keeping skin clean and dry  - Evaluate need for skin moisturizer/barrier cream  - Collaborate with interdisciplinary team   - Patient/family teaching  - Consider wound care consult   1/18/2023 1404 by Fausto Guzman RN  Outcome: Progressing  1/18/2023 1404 by Fausto Guzman RN  Outcome: Progressing     Problem: PAIN - ADULT  Goal: Verbalizes/displays adequate comfort level or baseline comfort level  Description: Interventions:  - Encourage patient to monitor pain and request assistance  - Assess pain using appropriate pain scale  - Administer analgesics based on type and severity of pain and evaluate response  - Implement non-pharmacological measures as appropriate and evaluate response  - Consider cultural and social influences on pain and pain management  - Notify physician/advanced practitioner if interventions unsuccessful or patient reports new pain  Outcome: Progressing     Problem: INFECTION - ADULT  Goal: Absence or prevention of progression during hospitalization  Description: INTERVENTIONS:  - Assess and monitor for signs and symptoms of infection  - Monitor lab/diagnostic results  - Monitor all insertion sites, i e  indwelling lines, tubes, and drains  - Monitor endotracheal if appropriate and nasal secretions for changes in amount and color  - [place] appropriate cooling/warming therapies per order  - Administer medications as ordered  - Instruct and encourage patient and family to use good hand hygiene technique  - Identify and instruct in appropriate isolation precautions for identified infection/condition  Outcome: Progressing  Goal: Absence of fever/infection during neutropenic period  Description: INTERVENTIONS:  - Monitor WBC    Outcome: Progressing     Problem: SAFETY ADULT  Goal: Maintain or return to baseline ADL function  Description: INTERVENTIONS:  -  Assess patient's ability to carry out ADLs; assess patient's baseline for ADL function and identify physical deficits which impact ability to perform ADLs (bathing, care of mouth/teeth, toileting, grooming, dressing, etc )  - Assess/evaluate cause of self-care deficits   - Assess range of motion  - Assess patient's mobility; develop plan if impaired  - Assess patient's need for assistive devices and provide as appropriate  - Encourage maximum independence but intervene and supervise when necessary  - Involve family in performance of ADLs  - Assess for home care needs following discharge   - Consider OT consult to assist with ADL evaluation and planning for discharge  - Provide patient education as appropriate  1/18/2023 1404 by Xiomy Yin RN  Outcome: Progressing  1/18/2023 1404 by Xiomy Yin RN  Outcome: Progressing  Goal: Maintains/Returns to pre admission functional level  Description: INTERVENTIONS:  - Perform BMAT or MOVE assessment daily    - Set and communicate daily mobility goal to care team and patient/family/caregiver     - Collaborate with rehabilitation services on mobility goals if consulted  - Out of bed for toileting  - Record patient progress and toleration of activity level   1/18/2023 1404 by Xiomy Yin RN  Outcome: Progressing  1/18/2023 1404 by Xiomy Yin RN  Outcome: Progressing  Goal: Patient will remain free of falls  Description: INTERVENTIONS:  - Educate patient/family on patient safety including physical limitations  - Instruct patient to call for assistance with activity   - Consult OT/PT to assist with strengthening/mobility   - Keep Call bell within reach  - Keep bed low and locked with side rails adjusted as appropriate  - Keep care items and personal belongings within reach  - Initiate and maintain comfort rounds  - Make Fall Risk Sign visible to staff  - Apply yellow socks and bracelet for high fall risk patients  - Consider moving patient to room near nurses station  Outcome: Progressing     Problem: DISCHARGE PLANNING  Goal: Discharge to home or other facility with appropriate resources  Description: INTERVENTIONS:  - Identify barriers to discharge w/patient and caregiver  - Arrange for needed discharge resources and transportation as appropriate  - Identify discharge learning needs (meds, wound care, etc )  - Arrange for interpretive services to assist at discharge as needed  - Refer to Case Management Department for coordinating discharge planning if the patient needs post-hospital services based on physician/advanced practitioner order or complex needs related to functional status, cognitive ability, or social support system  Outcome: Progressing     Problem: Knowledge Deficit  Goal: Patient/family/caregiver demonstrates understanding of disease process, treatment plan, medications, and discharge instructions  Description: Complete learning assessment and assess knowledge base    Interventions:  - Provide teaching at level of understanding  - Provide teaching via preferred learning methods  Outcome: Progressing

## 2023-01-18 NOTE — CONSULTS
Nutrition consult received and appreciated    Patient known to Nutrition from previous admission, diagnosed with moderate malnutrition,followed up for prolong poor/ inadequate PO intake in house  Pt noted with wt loss secondary to B/L AKA and true wt loss, however, not able to assess true wt loss at this time  Pt currently intubated, not able to perform nutrition focus physical assessment and diagnose malnutrition      Initiate EN as medically able, start Jevity 1 2cal at 20 ml/hr and advance 5 ml q8hr or as tolerated to goal rate 35ml/hr (will provide 1008kcal, 47g protein, 678ml free water, 84% RDI for micronutrients)   Recommend adding [employer] bid to provide additional 30g protein, 180 kcal;   Free water flushes: 150ml q6hr or per medical team    Nutrition will continue to follow up as per policy

## 2023-01-18 NOTE — SPEECH THERAPY NOTE
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 (HCC)    PAD (peripheral artery disease) (HCC)    HIT (heparin-induced thrombocytopenia)    History of CVA (cerebrovascular accident)    Metabolic encephalopathy    Fall    Hyponatremia    Past Medical History  Past Medical History:   Diagnosis Date   • Anxiety    • Depression    • Diabetes ([facility] )    • Diabetes mellitus ([facility] )    • Esophageal reflux     28 APR 2015 RESOLVED   • Hyperlipidemia    • Hypertension    • Low back pain     sciatica   • Pneumonia 2019   • Stroke ([facility] ) 12/2015    small vessel, L frontal corona radiata  Rx asa 81, Lipitor 40, risk modification   • Vitamin D deficiency      Past Surgical History  Past Surgical History:   Procedure Laterality Date   • AMPUTATION ABOVE KNEE (AKA) Right 12/1/2022    Procedure: (AKA), PSB  BKA;  Surgeon: Citlali Fox DO;  [facility];  Service: Vascular   • ARTERIOGRAM Right 11/7/2022    Procedure: -Right lower extremity angiogram -Right CFA balloon angioplasty with [id number]  [employer]  -Right CFA DCB with 6x40 Bard Lutonix -Right CFA atherectomy with Jetstream and distal embolization protection with 7mm Spider FX -Right SFA/Pop angioplasty with 4x40 Chololate balloon -Right SFA/Pop DCB with 5x150 Bard Lutonix -Right SFA stent with 6x80 W W  Axeda x2 -R   • COLONOSCOPY     • IR AORTAGRAM WITH RUN-OFF  10/31/2022   • IR LOWER EXTREMITY ANGIOGRAM  11/10/2022   • IR LOWER EXTREMITY ANGIOGRAM  11/7/2022   • AZ AMPUTATION THIGH THROUGH FEMUR ANY LEVEL Left 11/23/2022    Procedure: (AKA);   Surgeon: Suman Minaya DO; [facility];  Service: Vascular   • MN NEGATIVE PRESSURE WOUND THERAPY DME </= 50 SQ CM Bilateral 12/19/2022    Procedure: Right above knee amputation washout; vac change; Left above knee amputation debridement; vac placement;  Surgeon: Greg Abdul DO;  [facility];  Service: Vascular   • WOUND DEBRIDEMENT Right 12/16/2022    Procedure: AKA STUMP WASHOUT, Left AKA Staple removal  application of wound vac to Right AKA; Surgeon: Tulio Murray MD;  [facility];  Service: Vascular     Pt coded this am and was intubated  Order canceled  H&P/Admit info/ pertinent provider notes: (PMH noted above)  Chief Complaint: altered mental status   History of Present Illness:  Aram Valerio is a 65 y o  female with a PMH of peripheral vascular disease, with recent prolonged hospitalization complicated by HIT requiring bilateral AKA, stroke, diabetes who presents with altered mental status and fall from facility  In the emergency department the patient was found to have sepsis suspected due to UTI with patient meeting SIRS criteria with tachycardia, leukocytosis and fever  The patient is unable to provide history due to altered mental status  Fortunately her trauma scans in the emergency department are negative  Special Studies:  1/17/23 No evidence of acute trauma in the chest, abdomen or pelvis  1/17/23No acute cervical spine fracture or traumatic malalignment  1/17/23No evidence of acute maxillofacial fracture  CT head 1/17/23No acute intracranial hemorrhage or mass effect  Ct facial bones 1/15/23Left premaxillary and infraorbital extracranial soft tissue swelling  No facial bone fracture identified  Previous MBS:  None  Last seen here by another SLP 11/17/22  D/c'd on a regular diet w/ thin liquids

## 2023-01-18 NOTE — CONSULTS
Consultation - Infectious Disease   Keyona Stringer 65 y o  female MRN: [id number]  Unit/Bed#: ICU 12 Encounter: [id number]      IMPRESSION & RECOMMENDATIONS:   1  Sepsis, POA  Patient met sepsis criteria on presentation  Sources are 2 and 3  Patient unable to provide further history as she is intubated and course complicated by 4  Extensive resistance developing on prior cultures  2D echo technically difficult study  Extensive CT imaging without any focal findings otherwise  Antibiotics adjusted as below  Continue to trend fever curve/vitals  Repeat CBC and CMP tomorrow  Repeat blood cultures ordered for tomorrow  Follow-up pending urine and blood cultures  Additional imaging as below  Additional supportive care as per primary  Additional interventions pending clinical course    2  Polymicrobial bacteremia  Blood cultures have isolated Enterobacter and Enterococcus faecalis  Consider urinary source, no other ectopic focus of infection on extensive imaging  2D echo limited  Consider also endovascular source given 4  Other potential sources the patient's wound on her right AKA site  No other devices appreciated on  imaging  Patient unable to provide any extensive history  Agree with transition to meropenem, based on prior cultures  Continue vancomycin  Continue to trend fever curve/vitals  Repeat CBC and CMP tomorrow  Repeat blood cultures ordered for tomorrow  Recommend formal transesophageal echo  Consider surgical evaluation for wound on the right AKA site and possible images  We will follow-up pending cultures for susceptibilities and narrow therapy as needed  Anticipate at minimum 2 weeks of IV antibiotic for this issue  Additional interventions pending clinical course    3  Complicated urinary tract infection  Patient unable to provide any history at this time  UA was grossly abnormal on presentation  Possible source of the above    Continue antibiotic as above  Follow-up pending urine cultures  Monitor abdominal exam  Catheters as per primary    4  Cardiac arrest   Suspect that this may have been unrelated to the above  Patient with multiple other comorbidities as well  Ongoing care and work-up as per critical care/cardiology  5   Transaminitis  Acute elevation noted likely in the setting of 4  We will need to monitor LFTs however on meropenem  Antibiotic as above  Repeat LFTs tomorrow  Monitor abdominal exam    Above plan discussed in detail with critical care service  ID consult service will continue to follow  HISTORY OF PRESENT ILLNESS:  Reason for Consult: Bacteremia    HPI: Lynne Ding is a 65y o  year old female with anxiety, depression, diabetes, hyperlipidemia, hypertension, previous bilateral amputations after prolonged hospitalization complicated by HIT  The patient initially presented on 1/17 for altered mental status from facility  In the emergency department patient was felt to meet sepsis criteria possibly from a urinary tract infection  She was ultimately altered and unable to provide any history  She was admitted for further work-up and started on antibiotics  Over the last 24 hours the patient had a code in the morning and was ultimately admitted to the ICU intubated  Fevers continue at this time along with leukocytosis  Blood cultures reviewed so far and urine cultures pending  Vitals are relatively stable  Recent ER visit reviewed after patient had episode of fall  Patient was last seen by our service at the end of December for a left AKA stump abscess and cellulitis  She was ultimately recommended for an antibiotic course through 1/2  Previous culture data and susceptibilities reviewed  We are consulted at this time for further assistance with management given the patient's positive blood cultures  On evaluation the patient is intubated with her eyes open but does not follow commands appropriately    History therefore is gathered from chart review  REVIEW OF SYSTEMS:  Unable to obtain as the patient is intubated  PAST MEDICAL HISTORY:  Past Medical History:   Diagnosis Date   • Anxiety    • Depression    • Diabetes ([facility] )    • Diabetes mellitus ([facility] )    • Esophageal reflux     28 APR 2015 RESOLVED   • Hyperlipidemia    • Hypertension    • Low back pain     sciatica   • Pneumonia 2019   • [facility] 12/2015    small vessel, L frontal corona radiata  Rx asa 81, Lipitor 40, risk modification   • Vitamin D deficiency      Past Surgical History:   Procedure Laterality Date   • AMPUTATION ABOVE KNEE (AKA) Right 12/1/2022    Procedure: (AKA), PSB  BKA;  Surgeon: Carol Butler DO;  [facility];  Service: Vascular   • ARTERIOGRAM Right 11/7/2022    Procedure: -Right lower extremity angiogram -Right CFA balloon angioplasty with [id number]  [employer]  -Right CFA DCB with 6x40 Bard Lutonix -Right CFA atherectomy with Jetstream and distal embolization protection with 7mm Spider FX -Right SFA/Pop angioplasty with 4x40 Chololate balloon -Right SFA/Pop DCB with 5x150 Bard Lutonix -Right SFA stent with 6x80 W W  [employer] x2 -R   • COLONOSCOPY     • IR AORTAGRAM WITH RUN-OFF  10/31/2022   • IR LOWER EXTREMITY ANGIOGRAM  11/10/2022   • IR LOWER EXTREMITY ANGIOGRAM  11/7/2022   • MA AMPUTATION THIGH THROUGH FEMUR ANY LEVEL Left 11/23/2022    Procedure: (AKA); Surgeon: Francie Queen DO;  [facility];  Service: Vascular   • MA NEGATIVE PRESSURE WOUND THERAPY DME </= 50 SQ CM Bilateral 12/19/2022    Procedure: Right above knee amputation washout; vac change; Left above knee amputation debridement; vac placement;  Surgeon: Carol Butler DO;  [facility];  Service: Vascular   • WOUND DEBRIDEMENT Right 12/16/2022    Procedure: AKA STUMP WASHOUT, Left AKA Staple removal  application of wound vac to Right AKA;   Surgeon: Almita Garcia MD;  [facility];  Service: Vascular       FAMILY HISTORY:  Non-contributory    SOCIAL HISTORY:  Social History   Social History     Substance and Sexual Activity   Alcohol Use Never    Comment: OCCASIONAL ALCOHOL USE IN ALL SCRIPTS     Social History     Substance and Sexual Activity   Drug Use No     Social History     Tobacco Use   Smoking Status Former   • Types: Cigarettes   • Quit date:    • Years since quittin 0   Smokeless Tobacco Never       ALLERGIES:  Allergies   Allergen Reactions   • Heparin Other (See Comments)     History of HIT       MEDICATIONS:  All current active medications have been reviewed  PHYSICAL EXAM:  Temp:  [97 5 °F (36 4 °C)-104 2 °F (40 1 °C)] 103 3 °F (39 6 °C)  HR:  [] 88  Resp:  [19-50] 25  BP: ()/() 94/59  SpO2:  [94 %-100 %] 100 %  Temp (24hrs), Av 9 °F (38 8 °C), Min:97 5 °F (36 4 °C), Max:104 2 °F (40 1 °C)  Current: Temperature: (!) 103 3 °F (39 6 °C)    Intake/Output Summary (Last 24 hours) at 2023 1351  Last data filed at 2023 1327  Gross per 24 hour   Intake 450 ml   Output 3050 ml   Net -2600 ml       General Appearance:   Chronically ill-appearing, intubated and eyes are open  Head:  Normocephalic, without obvious abnormality, atraumatic; multiple areas of bruising noted along the face particularly on the left face and chin  Eyes:  Conjunctiva pink and sclera anicteric, both eyes   Nose: Nares normal, mucosa normal, no drainage   Throat:  ET tube in place without acute issue   Neck: Supple, symmetrical, no adenopathy, no tenderness/mass/nodules   Back:    Unable to turn the patient myself at this time to fully examine her back  Lungs:   Clear to auscultation bilaterally, respirations unlabored on vent   Chest Wall:  No tenderness or deformity   Heart:  RRR; no murmur, rub or gallop noted   Abdomen:   Soft, non-tender, non-distended, positive bowel sounds    Extremities: No cyanosis, clubbing; patient has some degree of swelling in all of her extremities     Skin: No rashes or lesions  No draining wounds noted  Was able to review the patient's right and left stump  There is an area on the right stump of slight necrosis and eschar  Lymph nodes: Cervical, supraclavicular nodes normal   Neurologic:  Intubated and does not interact on exam        LABS, IMAGING, & OTHER STUDIES:  In completing this consult I have performed an extensive review of the medical records in epic including review of the notes, radiographs, and laboratory results as detailed below  Lab Results:  I have personally reviewed pertinent labs  Comments/Interpretations: White count remains elevated  Hemoglobin stable  Platelets stable  LFTs elevated  Results from last 7 days   Lab Units 01/18/23  0832 01/18/23  0530 01/17/23  1605   WBC Thousand/uL 26 92* 16 42* 16 48*   HEMOGLOBIN g/dL 8 9* 9 6* 9 7*   PLATELETS Thousands/uL 332 267 269     Results from last 7 days   Lab Units 01/18/23  0832 01/18/23  0530 01/17/23  1605   POTASSIUM mmol/L 3 0* 2 9* 3 5   CHLORIDE mmol/L 97 96 98   CO2 mmol/L 16* 21 22   BUN mg/dL 15 13 20   CREATININE mg/dL 0 82 0 68 0 82   EGFR ml/min/1 73sq m 77 94 77   CALCIUM mg/dL 7 1* 7 9* 8 4   AST U/L 174* 20 15   ALT U/L 101* 10 9   ALK PHOS U/L 132* 140* 131*     Results from last 7 days   Lab Units 01/17/23  1606 01/17/23  1553   GRAM STAIN RESULT  Gram negative rods*  Gram positive cocci in pairs and chains* Gram positive cocci in pairs and chains*       Imaging Studies:   I have personally reviewed pertinent imaging study reports and images in PACS  Comments/Interpretations:  Chest x-ray without infiltrate  CT of the head pending, prior CT of the head without intracranial abnormality  CT chest/abdomen/pelvis with contrast on 1/17 without any focal findings  CT facial bone on 1/17 without fracture  Prior notable for some soft tissue swelling  CT of the cervical spine without fracture      Other Studies:   I have personally reviewed other pertinent reports as below  Records in [facility]: No recent admissions noted in [facility]  No recent culture data  Current/Prior Cultures: Prior cultures reviewed with previous isolation of Enterobacter with progressing resistance including intermediate susceptibility to ertapenem and resistance to cefepime and Zosyn

## 2023-01-18 NOTE — CODE DOCUMENTATION
Responded to CODE BLUE at 7  43AM this morning  Patient was found unresponsive with chest compressions already underway  IV access was limited, so sterile right femoral central line was placed  First pulse check revealed Vfib and biphasic defibrillation was delivered  ACLS continued  Patient was intubated during second pulse check  With the next pulse check ROSC was achieved  Please see MAR and additional code documentation for medication dosing

## 2023-01-18 NOTE — ASSESSMENT & PLAN NOTE
· In the setting of cardiac arrest, her lactic acid was elevated  · We will trend this  · We will continue to monitor her electrolytes and acid/base balance

## 2023-01-18 NOTE — ASSESSMENT & PLAN NOTE
· Her potassium was 2 9 then 3 post arrest   · Her hypokalemia could be a contributing factor  · We will replete her potassium- our goal will be to maintain the level > 4

## 2023-01-18 NOTE — NURSING NOTE
Patient rounded on this AM at approximately 0725 during morning shift report  Patient was alert and moaning, the nurse this morning said this is how the patient presented to the hospital  Patient did not look well with fevers over night and hypertension  SLIM notified and patient was a made a step down  PCA got patients vital signs at 0729 patient unchanged from rounds  While shift RN was walking by patients room, RN noticed patient seemed to be gasping for air and was clammy  Patient then appeared to stop breathing, RN called code blue at 0741  Compressions where immediately started, no pulse was felt  Team arrived promptly

## 2023-01-18 NOTE — H&P
[facility]  [facility] 1960, 65 y o  female MRN: [id number]  Unit/Bed#: E4 -01 Encounter: [id number]  Primary Care Provider: ALEX Sims   Date and time admitted to hospital: 1/17/2023  3:48 PM    * Sepsis due to urinary tract infection [facility])  Assessment & Plan  Patient presents with altered mental status, generalized weakness, fall sent from facility, found to have sepsis, UTI as suspected source  · Meets SIRS criteria with high grade fevers, tachycardia, leukocytosis  · Patient underwent trauma scans due to fall, no evidence of occult infection identified  · Current urinalysis consistent with UTI  · Prior urine cultures with Enterobacter cloacae, susceptibilities reviewed, patient received aztreonam in the ED   · Proceed with vancomycin and Zosyn  · Follow-up urine and blood culture results obtained in the ED  · IV fluids  · Request infectious disease consult    PAD (peripheral artery disease) ([facility] [address] )  Assessment & Plan  Patient is s/p bilateral AKA with history of HIT and prolonged hospitalization     Hyponatremia  Assessment & Plan  Likely hypovolemic due to sepsis  Recheck BMP in the morning    Fall  Assessment & Plan  Presents from facility with altered mental status, status post fall  Trauma scans negative     Metabolic encephalopathy  Assessment & Plan  Due to sepsis  Treat underlying conditions    History of CVA (cerebrovascular accident)  Assessment & Plan  Noted     HIT (heparin-induced thrombocytopenia)  Assessment & Plan  History of HIT on s/p b/l AKA  On warfarin per home regimen, therapeutic INR 2 9    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 [facility])  Assessment & Plan  Lab Results   Component Value Date    HGBA1C 9 8 (H) 10/25/2022       Recent Labs     01/15/23  1657   POCGLU 101       Blood Sugar Average: Last 72 hrs:  Patient is minimally communicative/encephalopathic in setting of acute illness, sepsis  Hold scheduled insulin, proceed with sliding scale     Moderate protein-calorie malnutrition ([address] )  Assessment & Plan  Malnutrition Findings:                                 BMI Findings: There is no height or weight on file to calculate BMI  Nutrition and speech therapy consult    Anxiety  Assessment & Plan  Hold medication regimen for now due to encephalopathy/hypoactive delirium in setting of sepsis     Esophageal reflux  Assessment & Plan  Continue PPI       VTE Pharmacologic Prophylaxis: VTE Score: 7 High Risk (Score >/= 5) - Pharmacological DVT Prophylaxis Ordered: warfarin (Coumadin)  Sequential Compression Devices Ordered  Code Status: Level 1 - Full Code   Discussion with family: Updated  (daughter) via phone  Anticipated Length of Stay: Patient will be admitted on an inpatient basis with an anticipated length of stay of greater than 2 midnights secondary to IV Rx, close monitoring   Total Time for Visit, including Counseling / Coordination of Care: 75 minutes  Greater than 50% of this total time spent on direct patient counseling and coordination of care  Chief Complaint: altered mental status     History of Present Illness:  Daniel Shah is a 65 y o  female with a PMH of peripheral vascular disease, with recent prolonged hospitalization complicated by HIT requiring bilateral AKA, stroke, diabetes who presents with altered mental status and fall from facility  In the emergency department the patient was found to have sepsis suspected due to UTI with patient meeting SIRS criteria with tachycardia, leukocytosis and fever  The patient is unable to provide history due to altered mental status  Fortunately her trauma scans in the emergency department are negative      Review of Systems:  Review of Systems   Unable to perform ROS: Mental status change   Hematological: Adenopathy: diabetic        Past Medical and Surgical History:   Past Medical History:   Diagnosis Date   • Anxiety    • Depression    • Diabetes ([facility] [address] )    • [facility] mellitus [facility]    • Esophageal reflux     28 APR 2015 RESOLVED   • Hyperlipidemia    • Hypertension    • Low back pain     sciatica   • Pneumonia 2019   • Stroke ([facility] ) 12/2015    small vessel, L frontal corona radiata  Rx asa 81, Lipitor 40, risk modification   • Vitamin D deficiency        Past Surgical History:   Procedure Laterality Date   • AMPUTATION ABOVE KNEE (AKA) Right 12/1/2022    Procedure: (AKA), PSB  BKA;  Surgeon: Sigrid Schilder, DO;  [facility];  Service: Vascular   • ARTERIOGRAM Right 11/7/2022    Procedure: -Right lower extremity angiogram -Right CFA balloon angioplasty with [id number]  [employer]  -Right CFA DCB with 6x40 Bard Lutonix -Right CFA atherectomy with Jetstream and distal embolization protection with 7mm Spider FX -Right SFA/Pop angioplasty with 4x40 Chololate balloon -Right SFA/Pop DCB with 5x150 Bard Lutonix -Right SFA stent with 6x80 W W  TrueInsider x2 -R   • COLONOSCOPY     • IR AORTAGRAM WITH RUN-OFF  10/31/2022   • IR LOWER EXTREMITY ANGIOGRAM  11/10/2022   • IR LOWER EXTREMITY ANGIOGRAM  11/7/2022   • RI AMPUTATION THIGH THROUGH FEMUR ANY LEVEL Left 11/23/2022    Procedure: (AKA); Surgeon: Blanca Pierson DO;  [facility];  Service: Vascular   • RI NEGATIVE PRESSURE WOUND THERAPY DME </= 50 SQ CM Bilateral 12/19/2022    Procedure: Right above knee amputation washout; vac change; Left above knee amputation debridement; vac placement;  Surgeon: Sigrid Schilder, DO;  [facility];  Service: Vascular   • WOUND DEBRIDEMENT Right 12/16/2022    Procedure: AKA STUMP WASHOUT, Left AKA Staple removal  application of wound vac to Right AKA; Surgeon: Elie Vicente MD;  [facility];  Service: Vascular       Meds/Allergies:  Prior to Admission medications    Medication Sig Start Date End Date Taking?  Authorizing Provider ALPRAZolam (XANAX) 0 25 mg tablet Take 1 tablet (0 25 mg total) by mouth daily as needed for anxiety (panic attacks) 4/15/22   ALEX Owusu   amLODIPine (NORVASC) 10 mg tablet TAKE 1 TABLET BY MOUTH EVERY DAY 9/28/22   ALEX Gutierrez   atorvastatin (LIPITOR) 40 mg tablet TAKE 1 TABLET BY MOUTH EVERY DAY 1/6/23   ALEX Gutierrez   buPROPion (WELLBUTRIN XL) 150 mg 24 hr tablet Take 1 tablet (150 mg total) by mouth daily 1/12/23   Jose Mejia PA-C   Cholecalciferol (Vitamin D-3) 25 MCG (1000 UT) CAPS Take 2,000 Units by mouth daily  Patient not taking: Reported on 10/21/2022    Historical Provider, MD   clopidogrel (PLAVIX) 75 mg tablet Take 1 tablet (75 mg total) by mouth daily 1/12/23   Jose Mejia PA-C   clotrimazole-betamethasone (LOTRISONE) 1-0 05 % cream Apply topically 2 (two) times a day 3/16/22   ALEX Gutierrez   Continuous Blood Gluc  (FreeStyle Noé 14 Day [place]) LITO Use 1 Units continuous  Patient not taking: Reported on 10/21/2022 8/25/21   Omega Dsouza MD   Continuous Blood Gluc Sensor (FreeStyle Noé 14 Day Sensor) MISC Use daily as directed for CGM - Change every 14 days  Patient not taking: No sig reported 3/31/22   Chris Hagan MD   doxazosin (CARDURA) 4 mg tablet Take 1 tablet (4 mg total) by mouth daily at bedtime 1/12/23   Jose Mejia PA-C   Doxylamine Succinate, Sleep, (UNISOM PO) Take by mouth  Patient not taking: No sig reported    Historical Provider, MD   fluocinonide (LIDEX) 0 05 % ointment Apply topically 2 (two) times a day 4/27/22   Leonora Krabbe, DPM   gabapentin (NEURONTIN) 100 mg capsule TAKE 2 CAPSULES BY MOUTH TWICE A DAY 8/27/22   ALEX Owusu   glucose blood (True Metrix Blood Glucose Test) test strip Use 1 each 4 (four) times a day  Patient not taking: No sig reported 6/14/21   ALEX Owusu   insulin aspart (NovoLOG FlexPen) 100 UNIT/ML injection pen Inject 20 Units under the skin 3 (three) times a day with meals 3/31/22   Myrtle Blanco MD   insulin glargine (Lantus SoloStar) 100 units/mL injection pen Inject 60 Units under the skin daily at bedtime 3/31/22   Myrtle Blanco MD   Insulin Pen Needle (B-D UF III MINI PEN NEEDLES) 31G X 5 MM MISC USE WITH INSULIN FOUR TIMES DAILY 4/15/22   ALEX Latham   Lancets 28G MISC Use 1 each 4 (four) times a day  Patient not taking: No sig reported 6/14/21   ALEX Henao   lisinopril (ZESTRIL) 40 mg tablet TAKE 1 TABLET BY MOUTH EVERY DAY 9/7/22   ALEX Latham   methocarbamol 1000 MG TABS Take 1,000 mg by mouth every 8 (eight) hours 1/12/23   Esha Lindo PA-C   metoprolol succinate (TOPROL-XL) 25 mg 24 hr tablet TAKE 1 TABLET (25 MG TOTAL) BY MOUTH DAILY  10/2/22   ALEX Latham   Multiple Vitamin (multivitamin) tablet Take 1 tablet by mouth daily    Historical Provider, MD   oxyCODONE (OxyCONTIN) 10 mg 12 hr tablet Take 1 tablet (10 mg total) by mouth every 12 (twelve) hours for 10 days Max Daily Amount: 20 mg 1/12/23 1/22/23  Esha Lindo PA-C   pantoprazole (PROTONIX) 40 mg tablet Take 1 tablet (40 mg total) by mouth daily for 14 days 2/21/22 3/7/22  Duane Sluder, MD   potassium chloride (K-DUR,KLOR-CON) 10 mEq tablet Take 2 tablets (20 mEq total) by mouth 2 (two) times a day for 2 doses 2/21/22 2/22/22  Duane Sluder, MD   QUEtiapine (SEROquel) 25 mg tablet Take 1 tablet (25 mg total) by mouth daily at bedtime 1/12/23   Esha Lindo PA-C   sertraline (ZOLOFT) 100 mg tablet TAKE 1 TABLET BY MOUTH EVERY DAY 8/1/22   ALEX Latham   Trulicity 9 38 ML/2 7OC SOPN INJECT 0 5 ML (0 75 MG TOTAL) UNDER THE SKIN ONCE A WEEK TUESDAY 5/5/22   Myrtle Blanco MD   warfarin (COUMADIN) 5 mg tablet Take 5mg daily for goal INR 2-3 1/12/23   Esha Lindo PA-C     I have reveiwed home medications using records provided by [facility]  Allergies:    Allergies   Allergen Reactions   • Heparin Other (See Comments)     History of HIT       Social History:  Marital Status: Single   Occupation: retired  Patient Pre-hospital Living Situation: nursing home facility   Patient Pre-hospital Level of Mobility: non-ambulatory   Patient Pre-hospital Diet Restrictions: diabetic   Substance Use History:   Social History     Substance and Sexual Activity   Alcohol Use Never    Comment: OCCASIONAL ALCOHOL USE IN ALL SCRIPTS     Social History     Tobacco Use   Smoking Status Former   • Types: Cigarettes   • Quit date:    • Years since quittin 0   Smokeless Tobacco Never     Social History     Substance and Sexual Activity   Drug Use No       Family History:  Family History   Problem Relation Age of Onset   • Diabetes Mother    • Lung cancer Mother    • Cancer Father    • Lung cancer Father    • Hypertension Brother    • Hypertension Family        Physical Exam:     Vitals:   Blood Pressure: (!) 175/81 (23)  Pulse: 88 (23)  Temperature: 98 9 °F (37 2 °C) (23)  Temp Source: Tympanic (23)  Respirations: (!) 24 (23)  SpO2: 94 % (23)    Physical Exam  Constitutional:       Appearance: She is ill-appearing  HENT:      Head:      Comments: Facial ecchymoses     Mouth/Throat:      Mouth: Mucous membranes are moist    Cardiovascular:      Rate and Rhythm: Regular rhythm  Tachycardia present  Pulmonary:      Effort: No respiratory distress  Breath sounds: No wheezing or rales  Abdominal:      General: There is no distension  Tenderness: There is no abdominal tenderness  Musculoskeletal:         General: Deformity (b/l AKA ) present  Skin:     General: Skin is warm and dry  Psychiatric:         Speech: She is noncommunicative            Additional Data:     Lab Results:  Results from last 7 days   Lab Units 23  1605   WBC Thousand/uL 16 48*   HEMOGLOBIN g/dL 9 7*   HEMATOCRIT % 31 2*   PLATELETS Thousands/uL 269   NEUTROS PCT % 88*   LYMPHS PCT % 5*   MONOS PCT % 6   EOS PCT % 0     Results from last 7 days   Lab Units 01/17/23  1605   SODIUM mmol/L 132*   POTASSIUM mmol/L 3 5   CHLORIDE mmol/L 98   CO2 mmol/L 22   BUN mg/dL 20   CREATININE mg/dL 0 82   ANION GAP mmol/L 12   CALCIUM mg/dL 8 4   ALBUMIN g/dL 3 2*   TOTAL BILIRUBIN mg/dL 0 37   ALK PHOS U/L 131*   ALT U/L 9   AST U/L 15   GLUCOSE RANDOM mg/dL 95     Results from last 7 days   Lab Units 01/17/23  1605   INR  2 96*     Results from last 7 days   Lab Units 01/15/23  1657 01/12/23  1120 01/12/23  0737 01/11/23  2034 01/11/23  1610 01/11/23  1103 01/11/23  0719 01/10/23  2112   POC GLUCOSE mg/dl 101 159* 115 139 115 182* 105 140         Results from last 7 days   Lab Units 01/17/23  1605   LACTIC ACID mmol/L 1 1   PROCALCITONIN ng/ml 0 27*       Lines/Drains:  Invasive Devices     Peripheral Intravenous Line  Duration           Peripheral IV 01/17/23 Left Antecubital <1 day    Peripheral IV 01/17/23 Right Antecubital <1 day          Drain  Duration           Urethral Catheter Temperature probe 16 Fr  <1 day              Urinary Catheter:  Goal for removal: TBD             Imaging:   CT chest abdomen pelvis w contrast   Final Result by Syed Ramos MD (01/17 1744)      No evidence of acute trauma in the chest, abdomen or pelvis  Workstation performed: [id number]         CT head without contrast   Final Result by Syed Ramos MD (01/17 1714)      No acute intracranial hemorrhage or mass effect  Workstation performed: [id number]         CT facial bones without contrast   Final Result by Syed Ramos MD (01/17 1718)      No evidence of acute maxillofacial fracture  Workstation performed: [id number]         CT spine cervical without contrast   Final Result by Syed Ramos MD (01/17 1720)      No acute cervical spine fracture or traumatic malalignment                     Workstation performed: [id number] EKG and Other Studies Reviewed on Admission:   · EKG:   Sinus tachycardia  Nonspecific ST abnormality  Abnormal ECG  When compared with ECG of 17-JAN-2023 15:58,  Criteria for Septal infarct are no longer Present  Nonspecific T wave abnormality, improved in Lateral leads  Confirmed by Stacy Perez ([id number]) on 1/17/2023 6:13:06 PM    ** Please Note: This note has been constructed using a voice recognition system   **

## 2023-01-18 NOTE — CASE MANAGEMENT
Case Management Progress Note    Patient name Kj Espinoza  Location ICU 12/ICU 12 MRN [id number]  : 1960 Date 2023       LOS (days): 1  Geometric Mean LOS (GMLOS) (days): 5 00  Days to GMLOS:4        OBJECTIVE:        Current admission status: Inpatient  Preferred Pharmacy:   [id number] [id number], [address] [address] [address]  [address]  [facility] [address]  Phone: [phone] Fax: [phone]    Primary Care Provider: ALEX Saldana    Primary Insurance: [id number]  Secondary Insurance:     PROGRESS NOTE:    CM made aware that there was a concern that patient had a urinary catheter that was improperly placed while patient was at [facility], possibly relating to UTI  CM called [facility] and spoke w/ patient's RN, who confirmed that patient arrived to [facility] from [facility] without a catheter  Per chart - pelvic CT results from 23 showed urinary catheter with possible placement in vagina in [facility] ED  CM discussed this with ICU RN as well, who assisted with confirming that catheter was originally placed in [facility] ED, then removed and replaced appropriately prior to patient moving up to [address] [place] [address]

## 2023-01-18 NOTE — PROGRESS NOTES
01/18/23 1200   Clinical Encounter Type   Visited With Family; Health care provider   Routine Visit Introduction   Continue Visiting Yes

## 2023-01-18 NOTE — DISCHARGE INSTR - OTHER ORDERS
Wound Care Plan:   1-Right anterior thigh per vascular surgery status post I & D   2-Low air-loss mattress  3-Offloading air cushion in chair if/when out of bed  4-Moisturize skin daily with skin nourishing cream   5-Turn/reposition every 2 hours while in bed using positioning wedges; and weight shift frequently while in chair for pressure re-distribution on skin  6-Buttocks/sacrum--cleanse with soap and water, pat dry  Apply Calazime paste three times daily and as needed with incontinence care  7-Right stump incision and lateral thigh wound--cleanse with normal saline, pat dry  Apply Dermagran (cut to fit size of wound)  Cover with Allevyn Life foam dressings  Change dressings every other day  Follow-up with vascular surgery for right thigh/stump wounds

## 2023-01-18 NOTE — ASSESSMENT & PLAN NOTE
Patient presents with altered mental status, generalized weakness, fall sent from facility, found to have sepsis, UTI as suspected source  · Meets SIRS criteria with high grade fevers, tachycardia, leukocytosis  · Patient underwent trauma scans due to fall, no evidence of occult infection identified  · Current urinalysis consistent with UTI  · Prior urine cultures with Enterobacter cloacae, susceptibilities reviewed, patient received aztreonam in the ED   · Proceed with vancomycin and Zosyn  · Follow-up urine and blood culture results obtained in the ED  · IV fluids  · Request infectious disease consult

## 2023-01-18 NOTE — PROCEDURES
Arterial Line Insertion    Date/Time: 1/18/2023 8:54 AM  Performed by: Mehran Rod DO  Authorized by: Mehran Rod DO     Patient location:  Bedside  Consent:     Consent obtained:  Emergent situation  Indications:     Indications: multiple ABGs and continuous blood pressure monitoring    Pre-procedure details:     Skin preparation:  Chlorhexidine    Preparation: Patient was prepped and draped in sterile fashion    Anesthesia (see MAR for exact dosages): Anesthesia method:  Local infiltration    Local anesthetic:  Lidocaine 1% w/o epi  Procedure details:     Location / Tip of Catheter:  Radial    Laterality:  Left    Juan Luis's test performed: yes      Juan Luis's test abnormal: no      Needle gauge:  20 G    Placement technique:  Percutaneous    Number of attempts:  2    Successful placement: yes      Transducer: waveform confirmed    Post-procedure details:     Post-procedure:  Sterile dressing applied and sutured    CMS:  Normal    Patient tolerance of procedure:   Tolerated well, no immediate complications

## 2023-01-18 NOTE — PROGRESS NOTES
Amandeep Ro is a 65 y o  female who is currently ordered Vancomycin IV with management by the Pharmacy Consult service  Relevant clinical data and objective / subjective history reviewed  Vancomycin Assessment:  1  Indication and Goal AUC/Trough: Other, sepsis, -600, trough 15-20         2  Micro:         Urine culture: In process        Blood culture: In process         3  Renal Function:  · SCr: 0 68 mg/dL  · CrCl: 84 6 mL/min  · Renal replacement: Not on dialysis  Days of Therapy: 2  Current Dose: 1750mg IV as a loading dose  Vancomycin Plan:  1  New Dosinmg IV every 12 hours  2  Estimated AUC: 515 mcg*hr/mL  3  Estimated Trough: 15 9 mcg/mL  4  Next Level: random level 23 at 0600  5  Renal Function Monitoring: Daily BMP and [place] will continue to follow closely for s/sx of nephrotoxicity, infusion reactions and appropriateness of therapy  BMP and CBC will be ordered per protocol  We will continue to follow the patient’s culture results and clinical progress daily      Melanie Nevarez, Pharmacist

## 2023-01-18 NOTE — ASSESSMENT & PLAN NOTE
· The patient was admitted with sepsis on 1/17  On the morning of 1/18 was found unresponsive and without a pulse  ROSC was obtained following 1 amp epinephrine and defibrillation with 200 joules  · We will change her antibiotics to meropenum based on her sensitivities  · She is noted to have polymicrobial bacteremia with enterococcus and enterobacter   · We will continue to monitor her fever curve, procal and WBC level  · Her arrest may be related to ischemia in the setting of infection but given her significant vascular issues and coronary calcifications seen on CT, a primary cardiac event is also considered likely     · A new infection is felt less likely to be a contributing factor

## 2023-01-18 NOTE — SEPSIS NOTE
Sepsis Note   Paulo Keen 65 y o  female MRN: [id number]  Unit/Bed#: ICU 12 Encounter: [id number]       qSOFA     Row Name 01/18/23 0930 01/18/23 0900 01/18/23 0729 01/18/23 0300 01/18/23 0103    Altered mental status GCS < 15 -- -- -- -- --    Respiratory Rate > / =22 1 1 0 -- --    Systolic BP < / =382 1 1 0 0 0    Q Sofa Score 2 2 0 1 2    Row Name 01/18/23 0028 01/17/23 2352 01/17/23 2245 01/17/23 2200 01/17/23 2009    Altered mental status GCS < 15 -- -- -- 1 --    Respiratory Rate > / =22 -- 1 -- -- 1    Systolic BP < / =930 0 -- 0 -- 0    Q Sofa Score 2 1 1 2 1    Row Name 01/17/23 1900 01/17/23 1800 01/17/23 1730 01/17/23 1724 01/17/23 1600    Altered mental status GCS < 15 -- -- -- -- 1    Respiratory Rate > / =22 0 0 1 1 --    Systolic BP < / =104 0 0 0 0 --    Q Sofa Score 1 0 1 1 2    Row Name 01/17/23 1551                Altered mental status GCS < 15 --        Respiratory Rate > / =81 1        Systolic BP < / =215 0        Q Sofa Score 1                   Initial Sepsis Screening     Row Name 01/17/23 1622                Is the patient's history suggestive of a new or worsening infection? Yes (Proceed)  -NB        Suspected source of infection suspect infection, source unknown  -NB        Are two or more of the following signs & symptoms of infection both present and new to the patient? Yes (Proceed)  -NB        Indicate SIRS criteria Hyperthemia > 38 3C (100 9F); Tachycardia > 90 bpm;Tachypnea > 20 resp per min;Leukocytosis (WBC > [address]83 IJL)  -NB        If the answer is yes to both questions, suspicion of sepsis is present --        If severe sepsis is present AND tissue hypoperfusion perists in the hour after fluid resuscitation or lactate > 4, the patient meets criteria for SEPTIC SHOCK --        Are any of the following organ dysfunction criteria present within 6 hours of suspected infection and SIRS criteria that are NOT considered to be chronic conditions?  No  -NB        Organ dysfunction -- Date of presentation of severe sepsis --        Time of presentation of severe sepsis --        Tissue hypoperfusion persists in the hour after crystalloid fluid administration, evidenced, by either: --        Was hypotension present within one hour of the conclusion of crystalloid fluid administration? No  -NB        Date of presentation of septic shock --        Time of presentation of septic shock --              User Key  (r) = Recorded By, (t) = Taken By, (c) = Cosigned By    [address] Name Provider Type    NB Tasha Echols DO Physician                  Default Flowsheet Data (last 720 hours)     Sepsis Reassess     Row Name 01/17/23 3954                   Repeat Volume Status and Tissue Perfusion Assessment Performed    Repeat Volume Status and Tissue Perfusion Assessment Performed Yes  -NB           Volume Status and Tissue Perfusion Post Fluid Resuscitation * Must Document All *    Vital Signs Reviewed (HR, RR, BP, T) Yes  -NB        Shock Index Reviewed --        Arterial Oxygen Saturation Reviewed (POx, SaO2 or SpO2) Yes (comment %)  -NB        Cardio Tachycardia;Normal S1/S2  -NB        Pulmonary Normal effort  -NB        Capillary Refill Brisk  -NB        Peripheral Pulses Radial  -NB        Peripheral Pulse +2  -NB        Skin Warm  -NB        Urine output assessed --           *OR*   Intensive Monitoring- Must Document One of the Following Four *:    Vital Signs Reviewed Yes  -NB        * Central Venous Pressure (CVP or RAP) --        * Central Venous Oxygen (SVO2, ScvO2 or Oxygen saturation via central catheter) --        * Bedside Cardiovascular US in IVC diameter and % collapse --        * Passive Leg Raise OR Crystalloid Challenge --              User Key  (r) = Recorded By, (t) = Taken By, (c) = Cosigned By    Initials Name Provider Type    NB Tasha Echols DO Physician                Patient had been admitted yesterday evening for sepsis and was found to be bacteremic   She was resuscitated yesterday, blood cultures are already obtained  Unfortunately she suffered a cardiac arrest this morning (see additional code documentation)  She is now in mixed cardiogenic and septic shock  She received antibiotics on admission, which have now been broadened given her prior cultures and sensitivities obtained thus far  Lactate is elevated >4  Will continue to resuscitate and add vasopressors

## 2023-01-18 NOTE — ASSESSMENT & PLAN NOTE
· Her initial troponin was elevated to > 1000  · May be demand in the setting of sepsis, but a primary ACS may also be present given her generalized vascular issues  · Cardiology has been consulted  · ECHO is pending  · We will trend her troponins

## 2023-01-18 NOTE — ASSESSMENT & PLAN NOTE
Malnutrition Findings:                                 BMI Findings: There is no height or weight on file to calculate BMI     Nutrition and speech therapy consult

## 2023-01-18 NOTE — UTILIZATION REVIEW
Initial Clinical Review    Admission: Date/Time/Statement:   Admission Orders (From admission, onward)     Ordered        01/17/23 1754  INPATIENT ADMISSION  Once                      Orders Placed This Encounter   Procedures   • INPATIENT ADMISSION     Standing Status:   Standing     Number of Occurrences:   1     Order Specific Question:   Level of Care     Answer:   Med Surg [16]     Order Specific Question:   Estimated length of stay     Answer:   More than 2 Midnights     Order Specific Question:   Certification     Answer:   I certify that inpatient services are medically necessary for this patient for a duration of greater than two midnights  See H&P and MD Progress Notes for additional information about the patient's course of treatment  ED Arrival Information     Expected   -    Arrival   1/17/2023 15:47    Acuity   Emergent            Means of arrival   Ambulance    Escorted by   [facility]   Critical Care/ICU    Admission type   Emergency            Arrival complaint   Altered mental status           Chief Complaint   Patient presents with   • Altered Mental Status     Arrives EMS from [facility] who reports altered mental status starting this morning  Pt was here recently for fall  Initial Presentation: 65 y o  female  To ER from SNF via  EMS  PMH:  peripheral vascular disease, with recent prolonged hospitalization complicated by HIT requiring bilateral AKA, stroke, diabetes  Today at SNF,  Noted decreased mentation, temp 102,  Fell at facility  IN ER,   patient was found to have sepsis suspected due to UTI w/ tachycardia, leukocytosis and fever   Trauma scans neg for any Fx  Will admit  IP Status ,   MS Level of  Care for  Management of  Sepsis  2/2  UTI with metabolic encephalopathy,  hypovolemia/hyponatremia  Will initiate IVF,  IV vanco and zosyn (based upon recent cultures)  Telemetry  [facility] admission blood and urine cultures     Consult ID, cardiology, nutrition  Obtain ECHO  Trend serial q shift neuro cks     Date:   1/18      Day 2:    CODE BLUE at 7  43AM this morning  Patient was found unresponsive  tele revealed Vfib and biphasic defibrillation was delivered  ACLS continued  Patient was intubated during second pulse check  With the next pulse check ROSC was achieved  TRANSFERRED TO  CRITICAL Level of Care:      Cont mechanical ventilation,  IVF, IV sedation/vasopressors, Amiodarone and insulin GTT's     Keep Npo,  Ng tube to suction  Daily spontaneous breathing assessment  Continuous cardiopulmonary monitoring; serial trops and ekg's   Obtain echo  Re-consult cardiology  change her antibiotics to meropenum based on her sensitivities  Noted d to have polymicrobial bacteremia with enterococcus and enterobacter ;  Closely trend fever curve, procal and wbc  Her arrest may be related to ischemia in the setting of infection but given her significant vascular issues and coronary calcifications seen on CT, a primary cardiac event is also considered likely  1/18  CARDIOLOGY:    type II MI in setting of severe sepsis and V  fib arrest     No evidence of ACS at this time, so no urgent need for an ischemic eval,    Continue with Plavix 75 mg daily  ,  amiodarone drip,     telemetry monitoring for possible recurrent VT/ectopy  When INR drops below 2, recommend starting heparin drip    Aggressively replete electrolytes, keep potassium greater than 4 0 magnesium greater than 2 0    1/18  Started Jevity per feeding tube       ED Triage Vitals   Temperature Pulse Respirations Blood Pressure SpO2   01/17/23 1608 01/17/23 1551 01/17/23 1551 01/17/23 1551 01/17/23 1551   (!) 104 2 °F (40 1 °C) (!) 143 (!) 24 (!) 209/101 95 %      Temp Source Heart Rate Source Patient Position - Orthostatic VS BP Location FiO2 (%)   01/17/23 1608 01/17/23 1551 01/17/23 1551 01/17/23 1551 --   Rectal Monitor Sitting Right arm       Pain Score       01/17/23 1615       Med Not Given for Pain - for MAR use only          Wt Readings from Last 1 Encounters:   01/18/23 72 1 kg (159 lb)     Additional Vital Signs:   01/18/23 1122 -- -- -- -- -- -- -- 100 % --   01/18/23 11:16:55 -- -- -- -- -- -- -- 100 % --   01/18/23 1100 -- 125 Abnormal  -- 94/59 -- -- -- -- --   01/18/23 0930 -- 124 Abnormal  29  94/59 84 136/46 70 mmHg 100 % --   01/18/23 0900 -- 130 Abnormal  50 87/66 76 -- -- 100 % --   01/18/23 0818 -- -- -- -- -- -- -- 96 % --   01/18/23 0729 103 3 °F 65 20 161/81 110 -- -- 100 % Nasal cannula   01/18/23 0300 102 4 ° 98 -- 132/82 -- -- -- -- --   01/18/23 0103 101 8 °F l  -- -- 126/52 -- -- -- -- --   01/18/23 0028 104 °F   -- -- 198/98  -- -- -- -- --   01/17/23 2352 97 5 °F  122 Abnormal  22 -- -- -- -- 97 % --   01/17/23 2245 -- -- -- 170/79 -- -- -- -- --   01/17/23 2009 98 9 °F  88 24  175/81  -- -- -- 94 % --   01/17/23 1900 -- 112 Abnormal  20 154/70 101 -- -- -- --   01/17/23 1852 102 8 °F  -- -- -- -- -- -- -- --   01/17/23 1800 -- 108 Abnormal  19 159/74 106 -- -- -- --   01/17/23 1730 -- 112 Abnormal  23 161/73 105 -- -- -- --   01/17/23 1724 -- 113 Abnormal  22 161/73 -- -- -- 95 % None (Room air)     Pertinent Labs/Diagnostic Test Results:     1/18  @  0016   EKG Sinus tachycardia  Poor R wave progression  Nonspecific ST and T wave abnormality  Abnormal ECG  When compared with ECG of 17-JAN-2023 18:08,  No significant change was found      XR chest portable   Final Result by Jose Vick MD (01/18 9709)   Typical endotracheal tube placement   Nasogastric tube in the proximal stomach which could be advanced for more distal gastric location if clinically appropriate   No acute cardiopulmonary disease            CT chest abdomen pelvis w contrast   Final Result by Fran Wayne MD (01/17 8683)   No evidence of acute trauma in the chest, abdomen or pelvis        CT head without contrast   Final Result by Fran Wayne MD (01/17 9361) No acute intracranial hemorrhage or mass effect  CT facial bones without contrast   Final Result by Rolando Tobar MD (01/17 1718)   No evidence of acute maxillofacial fracture  CT spine cervical without contrast   Final Result by Rolando Tobar MD (01/17 1720)   No acute cervical spine fracture or traumatic malalignment             Results from last 7 days   Lab Units 01/17/23  1605 01/11/23  1613   SARS-COV-2  Negative Negative     Results from last 7 days   Lab Units 01/18/23  0832 01/18/23  0530 01/17/23  1605   WBC Thousand/uL 26 92* 16 42* 16 48*   HEMOGLOBIN g/dL 8 9* 9 6* 9 7*   HEMATOCRIT % 29 0* 30 5* 31 2*   PLATELETS Thousands/uL 332 267 269   NEUTROS ABS Thousands/µL  --  14 50* 14 53*         Results from last 7 days   Lab Units 01/18/23  0832 01/18/23  0530 01/17/23  1605   SODIUM mmol/L 132* 133* 132*   POTASSIUM mmol/L 3 0* 2 9* 3 5   CHLORIDE mmol/L 97 96 98   CO2 mmol/L 16* 21 22   ANION GAP mmol/L 19* 16* 12   BUN mg/dL 15 13 20   CREATININE mg/dL 0 82 0 68 0 82   EGFR ml/min/1 73sq m 77 94 77   CALCIUM mg/dL 7 1* 7 9* 8 4   MAGNESIUM mg/dL 1 8*  --  1 5*   PHOSPHORUS mg/dL 5 2*  --   --      Results from last 7 days   Lab Units 01/18/23  0832 01/18/23  0530 01/17/23  1605   AST U/L 174* 20 15   ALT U/L 101* 10 9   ALK PHOS U/L 132* 140* 131*   TOTAL PROTEIN g/dL 6 0* 6 9 7 1   ALBUMIN g/dL 2 7* 3 1* 3 2*   TOTAL BILIRUBIN mg/dL 0 49 0 52 0 37     Results from last 7 days   Lab Units 01/18/23  1033 01/18/23  0831 01/18/23  0728 01/18/23  0005 01/17/23  2131 01/15/23  1657 01/12/23  1120 01/12/23  0737 01/11/23  2034 01/11/23  1610   POC GLUCOSE mg/dl 433* 320* 278* 166* 145* 101 159* 115 139 115     Results from last 7 days   Lab Units 01/18/23  0832 01/18/23  0530 01/17/23  1605   GLUCOSE RANDOM mg/dL 351* 210* 95       Results from last 7 days   Lab Units 01/18/23  1036   PH ART  7 421   PCO2 ART mm Hg 29 7*   PO2 ART mm Hg 223 0*   HCO3 ART mmol/L 18 9*   BASE EXC ART mmol/L -4 4   O2 CONTENT ART mL/dL 18 7   O2 HGB, ARTERIAL % 99 4*   ABG SOURCE  Line, Arterial     Results from last 7 days   Lab Units 01/17/23  1605   PH JORDEN  7 488*   PCO2 JORDEN mm Hg 29 3*   PO2 JORDEN mm Hg 54 0*   HCO3 JORDEN mmol/L 21 7*   BASE EXC JORDEN mmol/L -0 9   O2 CONTENT JORDEN ml/dL 13 6   O2 HGB, VENOUS % 87 7*             Results from last 7 days   Lab Units 01/17/23  2116 01/17/23  1826 01/17/23  1605   HS TNI 0HR ng/L  --   --  20   HS TNI 2HR ng/L  --  151*  --    HSTNI D2 ng/L  --  131*  --    HS TNI 4HR ng/L 287*  --   --    HSTNI D4 ng/L 267*  --   --          Results from last 7 days   Lab Units 01/18/23  0530 01/17/23  1605   PROTIME seconds 40 8* 30 6*   INR  4 29* 2 96*   PTT seconds  --  57*         Results from last 7 days   Lab Units 01/17/23  2116 01/17/23  1605   PROCALCITONIN ng/ml 1 37* 0 27*     Results from last 7 days   Lab Units 01/18/23  1050 01/18/23  0834 01/17/23  1605   LACTIC ACID mmol/L 1 5 6 7* 1 1       Results from last 7 days   Lab Units 01/17/23  1641   CLARITY UA  Cloudy   COLOR UA  Yellow   SPEC GRAV UA  1 025   PH UA  6 0   GLUCOSE UA mg/dl Negative   KETONES UA mg/dl Negative   BLOOD UA  Large*   PROTEIN UA mg/dl 100 (2+)*   NITRITE UA  Negative   BILIRUBIN UA  Negative   UROBILINOGEN UA E U /dl 0 2   LEUKOCYTES UA  Small*   WBC UA /hpf 30-50*   RBC UA /hpf 4-10*   BACTERIA UA /hpf Innumerable*   EPITHELIAL CELLS WET PREP /hpf Innumerable*   MUCUS THREADS  Occasional*     Results from last 7 days   Lab Units 01/17/23  1605 01/11/23  1613   INFLUENZA A PCR  Negative Negative   INFLUENZA B PCR  Negative Negative   RSV PCR  Negative Negative       Results from last 7 days   Lab Units 01/17/23  1606 01/17/23  1553   GRAM STAIN RESULT  Gram negative rods*  Gram positive cocci in pairs and chains* Gram positive cocci in pairs and chains*          ED Treatment:   Medication Administration from 01/17/2023 1547 to 01/17/2023 1944       Date/Time Order Dose Route Action 01/17/2023 1615 EST acetaminophen (TYLENOL) rectal suppository 650 mg 650 mg Rectal Given     01/17/2023 1607 EST ceftriaxone (ROCEPHIN) 1 g/50 mL in dextrose IVPB 1,000 mg Intravenous New Bag     01/17/2023 1650 EST sodium chloride 0 9 % bolus 1,000 mL 1,000 mL Intravenous New Bag     01/17/2023 1606 EST sodium chloride 0 9 % bolus 1,000 mL 1,000 mL Intravenous New Bag     01/17/2023 1637 EST iohexol (OMNIPAQUE) 350 MG/ML injection (SINGLE-DOSE) 100 mL 100 mL Intravenous Given     01/17/2023 1717 EST magnesium sulfate 2 g/50 mL IVPB (premix) 2 g 2 g Intravenous New Bag     01/17/2023 1728 EST ketorolac (TORADOL) injection 15 mg 15 mg Intravenous Given     01/17/2023 1744 EST aztreonam (AZACTAM) 2,000 mg in sodium chloride 0 9 % 100 mL IVPB 2,000 mg Intravenous New Bag        Past Medical History:   Diagnosis Date   • Anxiety    • Depression    • Diabetes ([facility] )    • Diabetes mellitus ([facility] )    • Esophageal reflux     28 APR 2015 RESOLVED   • Hyperlipidemia    • Hypertension    • Low back pain     sciatica   • Pneumonia 2019   • Stroke ([facility] ) 12/2015    small vessel, L frontal corona radiata  Rx asa 81, Lipitor 40, risk modification   • Vitamin D deficiency      Present on Admission:  • PAD (peripheral artery disease) ([facility])  • 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 ([facility])  • HIT (heparin-induced thrombocytopenia)  • Moderate protein-calorie malnutrition ([facility])  • Esophageal reflux  • Hypokalemia      Admitting Diagnosis: Hypomagnesemia [E83 42]  Coagulopathy (HCC) [D68 9]  UTI (urinary tract infection) [N39 0]  Altered mental status [R41 82]  Fever [R50 9]  Sepsis ([facility] ) [A41 9]  Age/Sex: 65 y o  female  Admission Orders:   See above note:    accucks qid w/SSI;  NPO;  HOB elevated;  Aspiration precautions          Scheduled Medications:  buPROPion, 150 mg, Oral, Daily  clopidogrel, 75 mg, Oral, Daily  clotrimazole-betamethasone, , Topical, BID  fluocinonide, , Topical, BID  lactated ringers, 500 mL, Intravenous, Once  magnesium sulfate, 2 g, Intravenous, Once  meropenem, 1,000 mg, Intravenous, Q8H  pantoprazole, 40 mg, Intravenous, Q24H MICHELLE  potassium chloride, 40 mEq, Intravenous, Once   Followed by  potassium chloride, 20 mEq, Intravenous, Once  sertraline, 100 mg, Oral, Daily  vancomycin, 1,000 mg, Intravenous, Q12H      Continuous IV Infusions:  amiodarone, 0 5 mg/min, Intravenous, Continuous  fentaNYL, 50 mcg/hr, Intravenous, Continuous  insulin regular (HumuLIN R,NovoLIN R) infusion, 0 3-21 Units/hr, Intravenous, Titrated  multi-electrolyte, 100 mL/hr, Intravenous, Continuous  norepinephrine, 1-30 mcg/min, Intravenous, Titrated  propofol, 5-50 mcg/kg/min, Intravenous, Titrated      PRN Meds:  acetaminophen, 650 mg, Rectal, Q6H PRN  acetaminophen, 650 mg, Oral, Q6H PRN  fentanyl citrate (PF), 50 mcg, Intravenous, Q2H PRN  perflutren lipid microsphere, 0 4 mL/min, Intravenous, Once in imaging        IP CONSULT TO INFECTIOUS DISEASES  IP CONSULT TO NUTRITION SERVICES  IP CONSULT TO PHARMACY  IP CONSULT TO CARDIOLOGY    Network Utilization Review Department  ATTENTION: Please call with any questions or concerns to [phone] and carefully listen to the prompts so that you are directed to the right person  All voicemails are confidential   Brunilda Malcolm all requests for admission clinical reviews, approved or denied determinations and any other requests to dedicated fax number below belonging to the campus where the patient is receiving treatment   List of dedicated fax numbers for the Facilities:  [address] DENIALS (Administrative/Medical Necessity) [phone]   [address] (Maternity/NICU/Pediatrics) Milena Kendall [facility] [phone]   [facility] [phone]   [facility] [phone]   [address] [place] [address] [address] [place] [address] [facility] Azar [facility] [address] [facility] 134 858 [facility] [phone]

## 2023-01-18 NOTE — PROCEDURES
Intubation    Date/Time: 1/18/2023 10:08 AM  Performed by: Lex Hodgson DO  Authorized by: Lex Hodgson DO     Patient location:  Bedside  Consent:     Consent obtained:  Emergent situation  Pre-procedure details:     Patient status:  Unresponsive    Mallampati score:  3    Pretreatment medications:  Etomidate    Paralytics:  None  Procedure details:     Preoxygenation:  Bag valve mask    CPR in progress: yes      Intubation method:  Oral    Oral intubation technique:  Direct    Laryngoscope blade: Mac 3    Tube size (mm):  7 5    Tube type:  Cuffed    Number of attempts:  1  Placement assessment:     Breath sounds:  Equal and absent over the epigastrium    Placement verification: chest rise, condensation, CXR verification, equal breath sounds, ETCO2 detector and tube exhalation    Comments:      Patient intubated during pulse check with ACLS underway  Please see separate code documentation for additional details

## 2023-01-19 LAB
ALBUMIN SERPL BCP-MCNC: 2.5 G/DL (ref 3.5–5)
ALP SERPL-CCNC: 121 U/L (ref 34–104)
ALT SERPL W P-5'-P-CCNC: 51 U/L (ref 7–52)
ANION GAP SERPL CALCULATED.3IONS-SCNC: 12 MMOL/L (ref 4–13)
AST SERPL W P-5'-P-CCNC: 47 U/L (ref 13–39)
BACTERIA UR CULT: ABNORMAL
BASOPHILS # BLD AUTO: 0.04 THOUSANDS/ÂΜL (ref 0–0.1)
BASOPHILS NFR BLD AUTO: 0 % (ref 0–1)
BILIRUB SERPL-MCNC: 0.36 MG/DL (ref 0.2–1)
BUN SERPL-MCNC: 23 MG/DL (ref 5–25)
CALCIUM ALBUM COR SERPL-MCNC: 8.6 MG/DL (ref 8.3–10.1)
CALCIUM SERPL-MCNC: 7.4 MG/DL (ref 8.4–10.2)
CHLORIDE SERPL-SCNC: 103 MMOL/L (ref 96–108)
CO2 SERPL-SCNC: 21 MMOL/L (ref 21–32)
CREAT SERPL-MCNC: 0.91 MG/DL (ref 0.6–1.3)
EOSINOPHIL # BLD AUTO: 0.01 THOUSAND/ÂΜL (ref 0–0.61)
EOSINOPHIL NFR BLD AUTO: 0 % (ref 0–6)
ERYTHROCYTE [DISTWIDTH] IN BLOOD BY AUTOMATED COUNT: 17.4 % (ref 11.6–15.1)
GFR SERPL CREATININE-BSD FRML MDRD: 68 ML/MIN/1.73SQ M
GLUCOSE SERPL-MCNC: 112 MG/DL (ref 65–140)
GLUCOSE SERPL-MCNC: 112 MG/DL (ref 65–140)
GLUCOSE SERPL-MCNC: 119 MG/DL (ref 65–140)
GLUCOSE SERPL-MCNC: 123 MG/DL (ref 65–140)
GLUCOSE SERPL-MCNC: 123 MG/DL (ref 65–140)
GLUCOSE SERPL-MCNC: 130 MG/DL (ref 65–140)
GLUCOSE SERPL-MCNC: 147 MG/DL (ref 65–140)
GLUCOSE SERPL-MCNC: 153 MG/DL (ref 65–140)
GLUCOSE SERPL-MCNC: 164 MG/DL (ref 65–140)
GLUCOSE SERPL-MCNC: 90 MG/DL (ref 65–140)
HCT VFR BLD AUTO: 22.4 % (ref 34.8–46.1)
HGB BLD-MCNC: 7.2 G/DL (ref 11.5–15.4)
IMM GRANULOCYTES # BLD AUTO: 0.22 THOUSAND/UL (ref 0–0.2)
IMM GRANULOCYTES NFR BLD AUTO: 1 % (ref 0–2)
LYMPHOCYTES # BLD AUTO: 1.2 THOUSANDS/ÂΜL (ref 0.6–4.47)
LYMPHOCYTES NFR BLD AUTO: 7 % (ref 14–44)
MAGNESIUM SERPL-MCNC: 2.4 MG/DL (ref 1.9–2.7)
MCH RBC QN AUTO: 26.6 PG (ref 26.8–34.3)
MCHC RBC AUTO-ENTMCNC: 32.1 G/DL (ref 31.4–37.4)
MCV RBC AUTO: 83 FL (ref 82–98)
MONOCYTES # BLD AUTO: 1.9 THOUSAND/ÂΜL (ref 0.17–1.22)
MONOCYTES NFR BLD AUTO: 11 % (ref 4–12)
NEUTROPHILS # BLD AUTO: 13.87 THOUSANDS/ÂΜL (ref 1.85–7.62)
NEUTS SEG NFR BLD AUTO: 81 % (ref 43–75)
NRBC BLD AUTO-RTO: 0 /100 WBCS
PHOSPHATE SERPL-MCNC: 3.7 MG/DL (ref 2.3–4.1)
PLATELET # BLD AUTO: 232 THOUSANDS/UL (ref 149–390)
PMV BLD AUTO: 8.9 FL (ref 8.9–12.7)
POTASSIUM SERPL-SCNC: 3.8 MMOL/L (ref 3.5–5.3)
PROT SERPL-MCNC: 5.4 G/DL (ref 6.4–8.4)
RBC # BLD AUTO: 2.71 MILLION/UL (ref 3.81–5.12)
SODIUM SERPL-SCNC: 136 MMOL/L (ref 135–147)
WBC # BLD AUTO: 17.24 THOUSAND/UL (ref 4.31–10.16)

## 2023-01-19 RX ORDER — FUROSEMIDE 10 MG/ML
20 INJECTION INTRAMUSCULAR; INTRAVENOUS ONCE
Status: COMPLETED | OUTPATIENT
Start: 2023-01-19 | End: 2023-01-19

## 2023-01-19 RX ORDER — FUROSEMIDE 10 MG/ML
40 INJECTION INTRAMUSCULAR; INTRAVENOUS ONCE
Status: COMPLETED | OUTPATIENT
Start: 2023-01-19 | End: 2023-01-19

## 2023-01-19 RX ORDER — LISINOPRIL 20 MG/1
40 TABLET ORAL DAILY
Status: DISCONTINUED | OUTPATIENT
Start: 2023-01-19 | End: 2023-02-03 | Stop reason: HOSPADM

## 2023-01-19 RX ORDER — SODIUM CHLORIDE, SODIUM GLUCONATE, SODIUM ACETATE, POTASSIUM CHLORIDE, MAGNESIUM CHLORIDE, SODIUM PHOSPHATE, DIBASIC, AND POTASSIUM PHOSPHATE .53; .5; .37; .037; .03; .012; .00082 G/100ML; G/100ML; G/100ML; G/100ML; G/100ML; G/100ML; G/100ML
500 INJECTION, SOLUTION INTRAVENOUS ONCE
Status: COMPLETED | OUTPATIENT
Start: 2023-01-19 | End: 2023-01-19

## 2023-01-19 RX ORDER — AMLODIPINE BESYLATE 10 MG/1
10 TABLET ORAL DAILY
Status: DISCONTINUED | OUTPATIENT
Start: 2023-01-19 | End: 2023-02-03 | Stop reason: HOSPADM

## 2023-01-19 RX ORDER — INSULIN LISPRO 100 [IU]/ML
1-5 INJECTION, SOLUTION INTRAVENOUS; SUBCUTANEOUS EVERY 6 HOURS SCHEDULED
Status: DISCONTINUED | OUTPATIENT
Start: 2023-01-19 | End: 2023-01-24

## 2023-01-19 RX ORDER — CHLORHEXIDINE GLUCONATE 0.12 MG/ML
15 RINSE ORAL EVERY 12 HOURS SCHEDULED
Status: DISCONTINUED | OUTPATIENT
Start: 2023-01-19 | End: 2023-01-23

## 2023-01-19 RX ADMIN — VANCOMYCIN HYDROCHLORIDE 1500 MG: 1 INJECTION, POWDER, LYOPHILIZED, FOR SOLUTION INTRAVENOUS at 13:05

## 2023-01-19 RX ADMIN — LISINOPRIL 40 MG: 20 TABLET ORAL at 16:47

## 2023-01-19 RX ADMIN — MEROPENEM 1000 MG: 1 INJECTION, POWDER, FOR SOLUTION INTRAVENOUS at 01:26

## 2023-01-19 RX ADMIN — Medication 50 MCG/HR: at 19:42

## 2023-01-19 RX ADMIN — METOPROLOL TARTRATE 25 MG: 25 TABLET, FILM COATED ORAL at 21:49

## 2023-01-19 RX ADMIN — PANTOPRAZOLE SODIUM 40 MG: 40 INJECTION, POWDER, FOR SOLUTION INTRAVENOUS at 09:32

## 2023-01-19 RX ADMIN — CLOTRIMAZOLE AND BETAMETHASONE DIPROPIONATE: 10; .64 CREAM TOPICAL at 09:33

## 2023-01-19 RX ADMIN — FUROSEMIDE 40 MG: 10 INJECTION, SOLUTION INTRAVENOUS at 17:33

## 2023-01-19 RX ADMIN — CHLORHEXIDINE GLUCONATE 0.12% ORAL RINSE 15 ML: 1.2 LIQUID ORAL at 00:23

## 2023-01-19 RX ADMIN — BUPROPION HYDROCHLORIDE 150 MG: 150 TABLET, FILM COATED, EXTENDED RELEASE ORAL at 09:32

## 2023-01-19 RX ADMIN — FUROSEMIDE 20 MG: 10 INJECTION, SOLUTION INTRAVENOUS at 13:18

## 2023-01-19 RX ADMIN — CHLORHEXIDINE GLUCONATE 0.12% ORAL RINSE 15 ML: 1.2 LIQUID ORAL at 21:49

## 2023-01-19 RX ADMIN — AMIODARONE HYDROCHLORIDE 0.5 MG/MIN: 50 INJECTION, SOLUTION INTRAVENOUS at 13:02

## 2023-01-19 RX ADMIN — SODIUM CHLORIDE, SODIUM GLUCONATE, SODIUM ACETATE, POTASSIUM CHLORIDE, MAGNESIUM CHLORIDE, SODIUM PHOSPHATE, DIBASIC, AND POTASSIUM PHOSPHATE 500 ML: .53; .5; .37; .037; .03; .012; .00082 INJECTION, SOLUTION INTRAVENOUS at 06:20

## 2023-01-19 RX ADMIN — MEROPENEM 1000 MG: 1 INJECTION, POWDER, FOR SOLUTION INTRAVENOUS at 09:34

## 2023-01-19 RX ADMIN — PROPOFOL 50 MCG/KG/MIN: 10 INJECTION, EMULSION INTRAVENOUS at 08:44

## 2023-01-19 RX ADMIN — POTASSIUM CHLORIDE 40 MEQ: 29.8 INJECTION, SOLUTION INTRAVENOUS at 00:00

## 2023-01-19 RX ADMIN — PROPOFOL 50 MCG/KG/MIN: 10 INJECTION, EMULSION INTRAVENOUS at 22:25

## 2023-01-19 RX ADMIN — PROPOFOL 50 MCG/KG/MIN: 10 INJECTION, EMULSION INTRAVENOUS at 04:47

## 2023-01-19 RX ADMIN — FLUOCINONIDE: 0.5 OINTMENT TOPICAL at 09:33

## 2023-01-19 RX ADMIN — SERTRALINE HYDROCHLORIDE 100 MG: 100 TABLET, FILM COATED ORAL at 09:32

## 2023-01-19 RX ADMIN — PROPOFOL 50 MCG/KG/MIN: 10 INJECTION, EMULSION INTRAVENOUS at 14:29

## 2023-01-19 RX ADMIN — SODIUM CHLORIDE, SODIUM GLUCONATE, SODIUM ACETATE, POTASSIUM CHLORIDE, MAGNESIUM CHLORIDE, SODIUM PHOSPHATE, DIBASIC, AND POTASSIUM PHOSPHATE 100 ML/HR: .53; .5; .37; .037; .03; .012; .00082 INJECTION, SOLUTION INTRAVENOUS at 05:16

## 2023-01-19 RX ADMIN — FLUOCINONIDE: 0.5 OINTMENT TOPICAL at 17:53

## 2023-01-19 RX ADMIN — FUROSEMIDE 40 MG: 10 INJECTION, SOLUTION INTRAVENOUS at 21:49

## 2023-01-19 RX ADMIN — FENTANYL CITRATE 50 MCG: 50 INJECTION INTRAMUSCULAR; INTRAVENOUS at 00:23

## 2023-01-19 RX ADMIN — MEROPENEM 1000 MG: 1 INJECTION, POWDER, FOR SOLUTION INTRAVENOUS at 17:33

## 2023-01-19 RX ADMIN — CLOPIDOGREL BISULFATE 75 MG: 75 TABLET ORAL at 09:32

## 2023-01-19 RX ADMIN — Medication 50 MCG/HR: at 01:22

## 2023-01-19 RX ADMIN — PROPOFOL 50 MCG/KG/MIN: 10 INJECTION, EMULSION INTRAVENOUS at 01:23

## 2023-01-19 RX ADMIN — CHLORHEXIDINE GLUCONATE 0.12% ORAL RINSE 15 ML: 1.2 LIQUID ORAL at 09:32

## 2023-01-19 RX ADMIN — FENTANYL CITRATE 50 MCG: 50 INJECTION INTRAMUSCULAR; INTRAVENOUS at 09:32

## 2023-01-19 RX ADMIN — CLOTRIMAZOLE AND BETAMETHASONE DIPROPIONATE: 10; .64 CREAM TOPICAL at 17:53

## 2023-01-19 RX ADMIN — PROPOFOL 50 MCG/KG/MIN: 10 INJECTION, EMULSION INTRAVENOUS at 18:41

## 2023-01-19 RX ADMIN — AMLODIPINE BESYLATE 10 MG: 10 TABLET ORAL at 16:46

## 2023-01-19 RX ADMIN — FENTANYL CITRATE 50 MCG: 50 INJECTION INTRAMUSCULAR; INTRAVENOUS at 23:34

## 2023-01-19 NOTE — ASSESSMENT & PLAN NOTE
· Type II MI likely 2/2 demand  · Cardiology following  No immediate ischemic w/u needed at this time   Will need after acute illness  · ECHO 1/18- ef 50-55%

## 2023-01-19 NOTE — ASSESSMENT & PLAN NOTE
· Etiology, possibly hypovolemic due to sepsis but was resuscitated and had BP ranges that were on the higher end   · She is on seroquel, zoloft and  lisinopril which can all contribute to SIADH development, particularly the zoloft  · We will continue to monitor her sodium level and hold her zoloft, seroquel and lisinopril for now  · Sodium level stable at this time   Await am labs

## 2023-01-19 NOTE — PROGRESS NOTES
Progress Note - Infectious Disease   Lauryn Fernandez 65 y o  female MRN: [id number]  Unit/Bed#: ICU 12 Encounter: [id number]      Impression/Plan:  1  Sepsis, POA  Patient met sepsis criteria on presentation  Sources are 2 and 3  Patient unable to provide further history as she is intubated and course complicated by 4  Extensive resistance developing on prior cultures  2D echo technically difficult study  Extensive CT imaging without any focal findings otherwise  Antibiotics as below  Continue to trend fever curve/vitals  Repeat CBC and CMP tomorrow  Follow-up pending blood cultures  Additional imaging as below  Additional supportive care as per primary  Additional interventions pending clinical course     2  Polymicrobial bacteremia  Blood cultures have isolated Enterobacter and Enterococcus faecalis  Consider urinary source, no other ectopic focus of infection on extensive imaging  2D echo limited  Consider also endovascular source given 4  Other potential sources the patient's wound on her right AKA site  No other devices appreciated on  imaging  Patient unable to provide any extensive history  Continue meropenem, based on prior cultures  Continue vancomycin  Continue to trend fever curve/vitals  Repeat CBC and CMP tomorrow  Follow-up pending blood cultures  Recommend formal transesophageal echo  Recommend surgical evaluation for wound on the right AKA site and possible images  We will follow-up pending cultures for susceptibilities and narrow therapy as needed  Anticipate at minimum 2 weeks of IV antibiotic for this issue  Additional interventions pending clinical course     3  Complicated urinary tract infection  Patient unable to provide any history at this time  UA was grossly abnormal on presentation  Possible source of the above  Urine cultures reviewed    Continue antibiotic as above  Monitor abdominal exam  Catheters as per primary     4   Cardiac arrest   Suspect that this may have been unrelated to the above  Patient with multiple other comorbidities as well  Ongoing care and work-up as per critical care/cardiology      5  Transaminitis  Acute elevation noted likely in the setting of 4  We will need to monitor LFTs however on meropenem  Improving  Antibiotic as above  Repeat LFTs tomorrow  Monitor abdominal exam     Above plan discussed in detail with critical care advanced practitioner and fellow  ID consult service will continue to follow  Antibiotics:  Meropenem/vancomycin 2  Total antibiotic 3    24 Hour events:  Yesterday and overnight notes reviewed and no acute events noted  Patient did have a high fever in the evening but that seems to have come down  White blood cell has also come down  Subjective: Intubated and sedated and unable to provide history  Objective:  Vitals:  Temp:  [98 2 °F (36 8 °C)-103 6 °F (39 8 °C)] 98 2 °F (36 8 °C)  HR:  [] 72  Resp:  [16-50] 16  BP: ()/(55-66) 157/55  SpO2:  [100 %] 100 %  Temp (24hrs), Av 7 °F (38 7 °C), Min:98 2 °F (36 8 °C), Max:103 6 °F (39 8 °C)  Current: Temperature: 98 2 °F (36 8 °C)    Physical Exam:   General Appearance:   Intubated and sedated and chronically ill-appearing   Throat:  ET tube in place without acute issue   Lungs:    Decreased breath sounds throughout, no wheezes, rales or rhonchi   Heart:  RRR; no murmur, rub or gallop noted   Abdomen:   Soft, non-tender, non-distended, positive bowel sounds  Extremities: No clubbing, cyanosis; pitting edema in all of her extremities   Skin: No new rashes or lesions  No new draining wounds noted  Dry eschar again noted on the right stump which appears stable but patient has discomfort when I manipulate the area  Labs, Imaging, & Other studies:   All pertinent labs and imaging studies were personally reviewed as below    Results from last 7 days   Lab Units 23  0448 23  1935 23  0832   WBC Thousand/uL 17 24* 14 53* 26 92*   HEMOGLOBIN g/dL 7 2* 7 7* 8 9*   PLATELETS Thousands/uL 232 253 332     Results from last 7 days   Lab Units 01/19/23  0448 01/18/23  1935 01/18/23  0832   POTASSIUM mmol/L 3 8 3 3* 3 0*   CHLORIDE mmol/L 103 105 97   CO2 mmol/L 21 23 16*   BUN mg/dL 23 22 15   CREATININE mg/dL 0 91 0 97 0 82   EGFR ml/min/1 73sq m 68 63 77   CALCIUM mg/dL 7 4* 7 5* 7 1*   AST U/L 47* 89* 174*   ALT U/L 51 71* 101*   ALK PHOS U/L 121* 134* 132*     Results from last 7 days   Lab Units 01/17/23  1606 01/17/23  1553   GRAM STAIN RESULT  Gram negative rods*  Gram positive cocci in pairs and chains* Gram positive cocci in pairs and chains*       Lab interpretation/comments: White count slightly downtrending  LFTs downtrending  Creatinine stable  Imaging interpretation/comments: Repeat CT of the head reviewed from the evening and there is no intracranial abnormalities appreciated  Culture data: Blood cultures reviewed thus far  Susceptibilities pending  Urine cultures polymicrobial but do have Enterococcus present  Repeat cultures were ordered and are pending

## 2023-01-19 NOTE — ASSESSMENT & PLAN NOTE
· Her potassium was 2 9 then 3 post arrest   · Her hypokalemia could be a contributing factor  · We will replete her potassium- our goal will be to maintain the level > 4  · Given 60 then 40 o/n for a K of 3 3

## 2023-01-19 NOTE — PROGRESS NOTES
Pastoral Care Progress Note    2023  Patient: [address] : 1960  Admission Date & Time: 2023 1548  MRN: [id number] CSN: [id number]      Continue to support family of patient, provided listening support and pastoral care presence

## 2023-01-19 NOTE — ASSESSMENT & PLAN NOTE
Malnutrition Findings:                                   BMI Findings: Body mass index is 27 29 kg/m²       -start tube feeds today if stays intubated

## 2023-01-19 NOTE — CASE MANAGEMENT
Case Management Assessment & Discharge Planning Note    Patient name Carolina Asencio  Location ICU 12/ICU 12 MRN [id number]  : 1960 Date 2023       Current Admission Date: 2023  Current Admission Diagnosis:Sepsis due to urinary tract infection [facility])   Patient Active Problem List    Diagnosis Date Noted   • Cardiac arrest ([address] )    • Metabolic acidosis    • Troponin level elevated 2023   • Hypomagnesemia 2023   • Sepsis due to urinary tract infection ([address] )    • Metabolic encephalopathy    • Fall 2023   • Hyponatremia 2023   • Mild protein-calorie malnutrition ([address] ) 2022   • Diarrhea 2022   • CVA (cerebral vascular accident) ([address] ) 2022   • Febrile 2022   • HIT (heparin-induced thrombocytopenia) 2022   • Diabetic ulcer of heel associated with diabetes mellitus due to underlying condition ([address] ) 11/10/2022   • Acute on chronic anemia 10/30/2022   • Generalized edema due to fluid overload 10/27/2022   • PAD (peripheral artery disease) ([address] ) 10/25/2022   • Non healing left heel wound 10/22/2022   • Hypertensive urgency 10/22/2022   • Hypokalemia 10/22/2022   • Wound of lower extremity 10/21/2022   • Epigastric pain 2022   • Type 2 diabetes mellitus with hyperglycemia, with long-term current use of insulin ([facility] ) 10/18/2021   • Hyperparathyroidism ([address] ) 10/18/2021   • 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 ([address] ) 2021   • 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   • Moderate protein-calorie malnutrition ([address] ) 2020   • Acute colitis 2020   • Arthritis 2019   • Depression, recurrent ([address] ) 2018   • Class 3 severe obesity due to excess calories with serious comorbidity and body mass index (BMI) of 40 0 to 44 9 in adult [facility]) 2018   • Acute respiratory failure with hypoxia ([facility] ) 2018   • Esophageal reflux 02/23/2018   • DM (diabetes mellitus) type II, controlled, with peripheral vascular disorder ([facility] ) 02/23/2018   • Diabetic peripheral neuropathy ([facility] ) 67/71/3416   • Systolic murmur 06/97/7808   • Stroke ([facility] ) 12/14/2015   • History of CVA (cerebrovascular accident) 12/14/2015   • Anxiety 03/19/2015   • Vitamin D deficiency 03/19/2015   • Benign essential hypertension 08/28/2012   • Familial combined hyperlipidemia 08/28/2012      LOS (days): 2  Geometric Mean LOS (GMLOS) (days): 5 00  Days to GMLOS:3 1     OBJECTIVE:  PATIENT READMITTED TO HOSPITAL  Risk of Unplanned Readmission Score: 38 78         Current admission status: Inpatient       Preferred Pharmacy:   [facility] [id number] Torres, [address] - [address] [facility] [address]  [facility] [address]  [place] [address] [address]  Phone: [phone] Fax: [phone]    Primary Care Provider: ALEX Vasquez    Primary Insurance: [address] [place]  Secondary Insurance:     ASSESSMENT:  [address], [address] - Child   Primary Phone: [phone] (Home)               Advance Directives  Does patient have a [address]?: Yes (daughter Yohannes Garcia)  Does patient have Advance Directives?: Yes  Primary Contact: Yohannes Garcia (daughter) [phone]         Readmission Root Cause  30 Day Readmission: Yes  Who directed you to return to the hospital?: Other (comment) (Staff at [facility])  Did you understand whom to contact if you had questions or problems?: Yes  Did you get your prescriptions before you left the hospital?: No (meds at [facility])  Were you able to get your prescriptions filled when you left the hospital?: Yes  Did you take your medications as prescribed?: Yes  During previous admission, was a post-acute recommendation made?: Yes  What post-acute resources were offered?: STR (pt d/c to [facility])  Patient was readmitted due to: Sepsis due to UTI    Patient Information  Admitted from[de-identified] Facility [facility])  Mental Status: Confused  During Assessment patient was accompanied by: Not accompanied during assessment  Assessment information provided by[de-identified] Daughter  Primary Caregiver: Private caregiver  Caregiver's Name[de-identified] [address] [place] [address] staff  Caregiver's Relationship to Patient[de-identified] Other (Specify)  Support Systems: Family members, Daughter  [place] of Residence: [address] do you live in?: [place] entry access options  Select all that apply : No steps to enter home  Type of Current Residence: Facility  Upon entering residence, is there a bedroom on the main floor (no further steps)?: Yes  Upon entering residence, is there a bathroom on the main floor (no further steps)?: Yes  In the last 12 months, was there a time when you were not able to pay the mortgage or rent on time?: No  In the last 12 months, how many places have you lived?: 1  In the last 12 months, was there a time when you did not have a steady place to sleep or slept in a shelter (including now)?: No  Homeless/housing insecurity resource given?: N/A  Living Arrangements: Other (Comment) (lives alone at home, but currently at [address] [place] [address])  Is patient a ?: No    Activities of Daily Living Prior to Admission  Functional Status: Total dependent  Completes ADLs independently?: No  Level of ADL dependence: Total Dependent  Ambulates independently?: No  Level of ambulatory dependence: Total Dependent  Does patient use assisted devices?: Yes  Assisted Devices (DME) used:  Wheelchair  Does patient currently own DME?: No  Does patient have a history of Outpatient Therapy (PT/OT)?: No  Does the patient have a history of Short-Term Rehab?: Yes ([facility])  Does patient have a history of HHC?: No  [facility] ?: No         Patient Information Continued  Income Source: Unemployed  Does patient have prescription coverage?: Yes  Within the past 12 months, you worried that your food would run out before you got the money to buy more : Never true  Within the past 12 months, the food you bought just didn't last and you didn't have money to get more : Never true  Food insecurity resource given?: N/A  Does patient receive dialysis treatments?: No  Does patient have a history of substance abuse?: No  Does patient have a history of Mental Health Diagnosis?: Yes (depression)  Is patient receiving treatment for mental health?: Yes (medication)  Has patient received inpatient treatment related to mental health in the last 2 years?: No         Means of Transportation  Means of Transport to [facility][de-identified] Other (Comment) ([facility] staff if needed)  Was application for public transport provided?: N/A        DISCHARGE DETAILS:    Discharge planning discussed with[de-identified] daughter Markel Goldmann of Choice: Yes     CM contacted family/caregiver?: Yes Farrah Nolasco)             Contacts  Patient Contacts: Марина Kathleen (malini)  Relationship to Patient[de-identified] Family  Contact Method: Phone  Phone Number: [phone]  Reason/Outcome: Emergency Contact, Discharge [address]         Is the patient interested in [address] at discharge?: No    DME Referral Provided  Referral made for DME?: No    Other Referral/Resources/Interventions Provided:  Referral Comments: CHIRAG sent to [facility] via [employer]

## 2023-01-19 NOTE — PROGRESS NOTES
[facility]  Progress Note - [address] 1960, 65 y o  female MRN: [id number]  Unit/Bed#: ICU 12 Encounter: [id number]  Primary Care Provider: ALEX Brown   Date and time admitted to hospital: 1/17/2023  3:48 PM    PAD (peripheral artery disease) ([facility] )  Assessment & Plan  -cont plavix for now    HIT (heparin-induced thrombocytopenia)  Assessment & Plan  -Hx of hit  -cont plavix  -platelets stable      Acute respiratory failure with hypoxia ([facility] )  Assessment & Plan  · She was intubated following the cardiac arrest  · We will continue vent support for now  · Our goal will be to maintain the oxygen saturation > 90%  · Daily wean      Hypomagnesemia  Assessment & Plan  -replete these with a goal to maintain the level > 2  -given 2gm o/n    Troponin level elevated  Assessment & Plan  · Type II MI likely 2/2 demand  · Cardiology following  No immediate ischemic w/u needed at this time  Will need after acute illness  · ECHO 1/18- ef 33-34%      Metabolic acidosis  Assessment & Plan  · In the setting of cardiac arrest, her lactic acid was elevated and now normalized  · We will continue to monitor her electrolytes and acid/base balance    Cardiac arrest [facility])  Assessment & Plan  · The etiology is not entirely known  DDX include primary cardiac event/ arrhythmia given her history of significant vascular disease, PE, Aspiration- PE is thought less likely given elevated INR, electrolyte abnormality, vs multifactorial 2/2 bacteremia    · We will check an EKG now- initial one following the arrest revealed No acute ST segment elevation but there is some evidence of up-sloping of her St segment suggesting ischemia  · amio started  · Cards following    Hyponatremia  Assessment & Plan    · Etiology, possibly hypovolemic due to sepsis but was resuscitated and had BP ranges that were on the higher end   · She is on seroquel, zoloft and  lisinopril which can all contribute to SIADH development, particularly the zoloft  · We will continue to monitor her sodium level and hold her zoloft, seroquel and lisinopril for now  · Sodium level stable at this time  Await am labs    Fall  Assessment & Plan  -pta fall  -facial ecchymosis  -pain and wound care    Metabolic encephalopathy  Assessment & Plan  -cont neuro checks  -cth 1/18- no acute finding    History of CVA (cerebrovascular accident)  Assessment & Plan  -no acute findings on ct scan 1/18  -neuro checks    Febrile  Assessment & Plan  -Cont tylenol  -id following  -cont meropenem  -will need cathie prior to extubation    Hypokalemia  Assessment & Plan  · Her potassium was 2 9 then 3 post arrest   · Her hypokalemia could be a contributing factor  · We will replete her potassium- our goal will be to maintain the level > 4  · Given 60 then 40 o/n for a K of 3 3    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 [facility])  Assessment & Plan  Lab Results   Component Value Date    HGBA1C 9 8 (H) 10/25/2022       Recent Labs     01/18/23  2148 01/18/23  2254 01/19/23  0002 01/19/23  0200   POCGLU 123 129 112 90       Blood Sugar Average: Last 72 hrs:  (P) 187     -d/c insulin gtt and start ssi      Moderate protein-calorie malnutrition ([facility] )  Assessment & Plan  Malnutrition Findings:                                   BMI Findings: Body mass index is 27 29 kg/m²  -start tube feeds today if stays intubated    Esophageal reflux  Assessment & Plan  · We will place her on protonix    * Sepsis due to urinary tract infection [facility])  Assessment & Plan  · The patient was admitted with sepsis on 1/17  On the morning of 1/18 was found unresponsive and without a pulse   ROSC was obtained following 1mg epinephrine and defibrillation with 200 joules  · We will change her antibiotics to meropenum based on her sensitivities  · She is noted to have polymicrobial bacteremia with enterococcus and enterobacter   · monitor her fever curve, procal and WBC level  · Her arrest may be related to ischemia in the setting of infection but given her significant vascular issues and coronary calcifications seen on CT a w/u is needed   · A new infection is felt less likely to be a contributing factor  · Pt febrile 103 o/n  · Will need cathie prior to extubation      ----------------------------------------------------------------------------------------  HPI/24hr events: o/n--pt had fever 103 o/n  Tylenol and cooling blanket given  Right groin line oozing and redressed  inr 4 29  eyes roaming on exam  cth done without acute change  Potassium and magnesium replaced  500ml plasmalyte bolus x 2 for decreased uop  Patient appropriate for transfer out of the ICU today?: No  Disposition: Continue Critical Care   Code Status: Level 1 - Full Code  ---------------------------------------------------------------------------------------  SUBJECTIVE  ett    Review of Systems   Unable to perform ROS: Intubated     Review of systems was unable to be performed secondary to ett, sedated  ---------------------------------------------------------------------------------------  OBJECTIVE    Vitals   Vitals:    23 0100 23 0200 23 0300 23 0354   BP:       BP Location:       Pulse: 86 82 80    Resp: 16 18 18    Temp: (!) 102 6 °F (39 2 °C) (!) 101 3 °F (38 5 °C) (!) 100 8 °F (38 2 °C)    TempSrc: Bladder Bladder Bladder    SpO2: 100% 100% 100% 100%   Weight:       Height:         Temp (24hrs), Av 5 °F (39 2 °C), Min:100 8 °F (38 2 °C), Max:103 6 °F (39 8 °C)  Current: Temperature: (!) 100 8 °F (38 2 °C)  Arterial Line BP: 176/54  Arterial Line MAP (mmHg): 86 mmHg    Respiratory:  SpO2: SpO2: 100 %       Invasive/non-invasive ventilation settings   Respiratory    Lab Data (Last 4 hours)    None         O2/Vent Data (Last 4 hours)    None                Physical Exam  Vitals and nursing note reviewed  Constitutional:       General: She is not in acute distress  Appearance: She is well-developed  Comments: sedated   HENT:      Head: Normocephalic  Comments: Left facial and neck ecchymosis  Forehead stitches without drainage or redness  Eyes:      Conjunctiva/sclera: Conjunctivae normal       Comments: Eyes roaming back and forth early in night  Currently right pupil 2 reactive, left sluggish 3   Cardiovascular:      Rate and Rhythm: Normal rate and regular rhythm  Heart sounds: No murmur heard  Pulmonary:      Effort: Pulmonary effort is normal  No respiratory distress  Breath sounds: Normal breath sounds  Comments: ett in place  Abdominal:      Palpations: Abdomen is soft  Tenderness: There is no abdominal tenderness  Musculoskeletal:         General: No swelling  Cervical back: Neck supple  Comments: B/l aka  Wounds c/d w/o infxn  Skin:     General: Skin is warm and dry  Capillary Refill: Capillary refill takes less than 2 seconds  Neurological:      Comments: Opened eyes to pain earlier  Currently sedated unresponsive     Psychiatric:         Mood and Affect: Mood normal              Laboratory and Diagnostics:  Results from last 7 days   Lab Units 01/19/23 0448 01/18/23 1935 01/18/23 0832 01/18/23  0530 01/17/23  1605   WBC Thousand/uL 17 24* 14 53* 26 92* 16 42* 16 48*   HEMOGLOBIN g/dL 7 2* 7 7* 8 9* 9 6* 9 7*   HEMATOCRIT % 22 4* 24 8* 29 0* 30 5* 31 2*   PLATELETS Thousands/uL 232 253 332 267 269   NEUTROS PCT % 81* 83*  --  89* 88*   MONOS PCT % 11 10  --  5 6   MONO PCT %  --   --  8  --   --      Results from last 7 days   Lab Units 01/19/23 0448 01/18/23 1935 01/18/23 0832 01/18/23  0530 01/17/23  1605   SODIUM mmol/L 136 137 132* 133* 132*   POTASSIUM mmol/L 3 8 3 3* 3 0* 2 9* 3 5   CHLORIDE mmol/L 103 105 97 96 98   CO2 mmol/L 21 23 16* 21 22   ANION GAP mmol/L 12 9 19* 16* 12   BUN mg/dL 23 22 15 13 20   CREATININE mg/dL 0 91 0 97 0 82 0  68 0 82   CALCIUM mg/dL 7 4* 7 5* 7 1* 7 9* 8 4   GLUCOSE RANDOM mg/dL 112 151* 351* 210* 95   ALT U/L 51 71* 101* 10 9   AST U/L 47* 89* 174* 20 15   ALK PHOS U/L 121* 134* 132* 140* 131*   ALBUMIN g/dL 2 5* 2 6* 2 7* 3 1* 3 2*   TOTAL BILIRUBIN mg/dL 0 36 0 37 0 49 0 52 0 37     Results from last 7 days   Lab Units 01/18/23  0832 01/17/23  1605   MAGNESIUM mg/dL 1 8* 1 5*   PHOSPHORUS mg/dL 5 2*  --       Results from last 7 days   Lab Units 01/18/23  0530 01/17/23  1605   INR  4 29* 2 96*   PTT seconds  --  57*          Results from last 7 days   Lab Units 01/18/23  1050 01/18/23  0834 01/17/23  1605   LACTIC ACID mmol/L 1 5 6 7* 1 1     ABG:  Results from last 7 days   Lab Units 01/18/23  1036   PH ART  7 421   PCO2 ART mm Hg 29 7*   PO2 ART mm Hg 223 0*   HCO3 ART mmol/L 18 9*   BASE EXC ART mmol/L -4 4   ABG SOURCE  Line, Arterial     VBG:  Results from last 7 days   Lab Units 01/18/23  1036 01/17/23  1605   PH JORDEN   --  7 488*   PCO2 JORDEN mm Hg  --  29 3*   PO2 JORDEN mm Hg  --  54 0*   HCO3 JORDEN mmol/L  --  21 7*   BASE EXC JORDEN mmol/L  --  -0 9   ABG SOURCE  Line, Arterial  --      Results from last 7 days   Lab Units 01/17/23  2116 01/17/23  1605   PROCALCITONIN ng/ml 1 37* 0 27*       Micro  Results from last 7 days   Lab Units 01/17/23  1606 01/17/23  1553   GRAM STAIN RESULT  Gram negative rods*  Gram positive cocci in pairs and chains* Gram positive cocci in pairs and chains*         Imaging: I have personally reviewed pertinent reports  and I have personally reviewed pertinent films in PACS    Intake and Output  I/O       01/17 0701  01/18 0700 01/18 0701 01/19 0700    I V  (mL/kg)  3021 9 (41 9)    IV Piggyback 150 1150    Total Intake(mL/kg) 150 4171 9 (57 9)    Urine (mL/kg/hr) 1700 1925 (1 1)    Total Output 1700 1925    Net -1550 +2246 9                Height and Weights   Height: 5' 4" (162 6 cm)  IBW (Ideal Body Weight): 54 7 kg  Body mass index is 27 29 kg/m²    Weight (last 2 days) Date/Time Weight    01/18/23 1100 72 1 (159)            Nutrition       Diet Orders   (From admission, onward)             Start     Ordered    01/18/23 0258  Diet NPO  Diet effective now        References:    Nutrtion Support Algorithm Enteral vs  Parenteral   Question Answer Comment   Diet Type NPO    RD to adjust diet per protocol?  Yes        01/18/23 0257                  Active Medications  Scheduled Meds:  Current Facility-Administered Medications   Medication Dose Route Frequency Provider Last Rate   • acetaminophen  650 mg Rectal Q6H PRN Esperanza Alcantara, DO     • acetaminophen  650 mg Oral Q6H PRN Wan Alcantara, DO     • amiodarone  0 5 mg/min Intravenous Continuous ALEX Leyva 0 5 mg/min (01/18/23 1644)   • buPROPion  150 mg Oral Daily Wan Alcantara, DO     • chlorhexidine  15 mL Mouth/Throat Q12H [facility] Le Babinski, PA-C     • clopidogrel  75 mg Oral Daily Wan Alcantara, DO     • clotrimazole-betamethasone   Topical BID Wan Alcantara DO     • fentaNYL  50 mcg/hr Intravenous Continuous Wan Almeidaico, DO 50 mcg/hr (01/19/23 0122)   • fentanyl citrate (PF)  50 mcg Intravenous Q2H PRN Wan Alcantara, DO     • fluocinonide   Topical BID Wan Alcantara, DO     • insulin lispro  1-5 Units Subcutaneous Q6H [facility] Le Babinski, PA-C     • meropenem  1,000 mg Intravenous Q8H Wan Alcantara, DO Stopped (01/19/23 0226)   • multi-electrolyte  500 mL Intravenous Once Le Babinski, PA-C     • multi-electrolyte  100 mL/hr Intravenous Continuous Wan WORRELL Dirico,  mL/hr (01/19/23 0516)   • norepinephrine  1-30 mcg/min Intravenous Titrated Mega Fernandes MD Stopped (01/18/23 1441)   • pantoprazole  40 mg Intravenous Q24H [facility] ALEX Portillo     • perflutren lipid microsphere  0 4 mL/min Intravenous Once in imaging ALEX Leyva     • propofol  5-50 mcg/kg/min Intravenous Titrated Wan WORRELL Dirico, DO 50 mcg/kg/min (01/19/23 1762)   • sertraline  100 mg Oral Daily Wan Alcantara DO     • vancomycin  1,500 mg Intravenous Q24H Alfonso Avendano MD       Continuous Infusions:  amiodarone, 0 5 mg/min, Last Rate: 0 5 mg/min (01/18/23 1644)  fentaNYL, 50 mcg/hr, Last Rate: 50 mcg/hr (01/19/23 0122)  multi-electrolyte, 100 mL/hr, Last Rate: 100 mL/hr (01/19/23 0516)  norepinephrine, 1-30 mcg/min, Last Rate: Stopped (01/18/23 1441)  propofol, 5-50 mcg/kg/min, Last Rate: 50 mcg/kg/min (01/19/23 0447)      PRN Meds:   acetaminophen, 650 mg, Q6H PRN  acetaminophen, 650 mg, Q6H PRN  fentanyl citrate (PF), 50 mcg, Q2H PRN  perflutren lipid microsphere, 0 4 mL/min, Once in imaging        Invasive Devices Review  Invasive Devices     Central Venous Catheter Line  Duration           CVC Central Lines 01/18/23 <1 day          Peripheral Intravenous Line  Duration           Peripheral IV 01/17/23 Left Antecubital 1 day          Arterial Line  Duration           Arterial Line 01/18/23 Radial <1 day          Drain  Duration           Urethral Catheter Temperature probe 16 Fr  1 day    NG/OG/Enteral Tube Orogastric Left mouth <1 day          Airway  Duration           ETT  Cuffed 7 5 mm <1 day                Rationale for remaining devices: cont central line, ett, jaime for acute illness  ---------------------------------------------------------------------------------------  Advance Directive and Living Will:      Power of : Yes  POLST:    ---------------------------------------------------------------------------------------  Care Time Delivered:   Upon my evaluation, this patient had a high probability of imminent or life-threatening deterioration due to 35, which required my direct attention, intervention, and personal management  I have personally provided 35 minutes ([phone] to [phone]) of critical care time, exclusive of procedures, teaching, family meetings, and any prior time recorded by providers other than myself         Ana M Sanz, PA-C      Portions of the record may have been created with voice recognition software  Occasional wrong word or "sound a like" substitutions may have occurred due to the inherent limitations of voice recognition software    Read the chart carefully and recognize, using context, where substitutions have occurred

## 2023-01-19 NOTE — PLAN OF CARE
Problem: MOBILITY - ADULT  Goal: Maintain or return to baseline ADL function  Description: INTERVENTIONS:  -  Assess patient's ability to carry out ADLs; assess patient's baseline for ADL function and identify physical deficits which impact ability to perform ADLs (bathing, care of mouth/teeth, toileting, grooming, dressing, etc )  - Assess/evaluate cause of self-care deficits   - Assess range of motion  - Assess patient's mobility; develop plan if impaired  - Assess patient's need for assistive devices and provide as appropriate  - Encourage maximum independence but intervene and supervise when necessary  - Involve family in performance of ADLs  - Assess for home care needs following discharge   - Consider OT consult to assist with ADL evaluation and planning for discharge  - Provide patient education as appropriate  Outcome: Progressing  Goal: Maintains/Returns to pre admission functional level  Description: INTERVENTIONS:  - Perform BMAT or MOVE assessment daily    - Set and communicate daily mobility goal to care team and patient/family/caregiver  - Collaborate with rehabilitation services on mobility goals if consulted  - Perform Range of Motion 2 times a day  - Reposition patient every 2 hours    - Dangle patient 2 times a day  - Stand patient 0 times a day  - Ambulate patient 0 times a day  - Out of bed to chair 0 times a day   - Out of bed for meals 0 times a day  - Out of bed for toileting  - Record patient progress and toleration of activity level   Outcome: Progressing     Problem: Prexisting or High Potential for Compromised Skin Integrity  Goal: Skin integrity is maintained or improved  Description: INTERVENTIONS:  - Identify patients at risk for skin breakdown  - Assess and monitor skin integrity  - Assess and monitor nutrition and hydration status  - Monitor labs   - Assess for incontinence   - Turn and reposition patient  - Assist with mobility/ambulation  - Relieve pressure over bony prominences  - Avoid friction and shearing  - Provide appropriate hygiene as needed including keeping skin clean and dry  - Evaluate need for skin moisturizer/barrier cream  - Collaborate with interdisciplinary team   - Patient/family teaching  - Consider wound care consult   Outcome: Progressing     Problem: PAIN - ADULT  Goal: Verbalizes/displays adequate comfort level or baseline comfort level  Description: Interventions:  - Encourage patient to monitor pain and request assistance  - Assess pain using appropriate pain scale  - Administer analgesics based on type and severity of pain and evaluate response  - Implement non-pharmacological measures as appropriate and evaluate response  - Consider cultural and social influences on pain and pain management  - Notify physician/advanced practitioner if interventions unsuccessful or patient reports new pain  Outcome: Progressing     Problem: INFECTION - ADULT  Goal: Absence or prevention of progression during hospitalization  Description: INTERVENTIONS:  - Assess and monitor for signs and symptoms of infection  - Monitor lab/diagnostic results  - Monitor all insertion sites, i e  indwelling lines, tubes, and drains  - Monitor endotracheal if appropriate and nasal secretions for changes in amount and color  - [place] appropriate cooling/warming therapies per order  - Administer medications as ordered  - Instruct and encourage patient and family to use good hand hygiene technique  - Identify and instruct in appropriate isolation precautions for identified infection/condition  Outcome: Progressing  Goal: Absence of fever/infection during neutropenic period  Description: INTERVENTIONS:  - Monitor WBC    Outcome: Progressing     Problem: SAFETY ADULT  Goal: Maintain or return to baseline ADL function  Description: INTERVENTIONS:  -  Assess patient's ability to carry out ADLs; assess patient's baseline for ADL function and identify physical deficits which impact ability to perform ADLs (bathing, care of mouth/teeth, toileting, grooming, dressing, etc )  - Assess/evaluate cause of self-care deficits   - Assess range of motion  - Assess patient's mobility; develop plan if impaired  - Assess patient's need for assistive devices and provide as appropriate  - Encourage maximum independence but intervene and supervise when necessary  - Involve family in performance of ADLs  - Assess for home care needs following discharge   - Consider OT consult to assist with ADL evaluation and planning for discharge  - Provide patient education as appropriate  Outcome: Progressing  Goal: Maintains/Returns to pre admission functional level  Description: INTERVENTIONS:  - Perform BMAT or MOVE assessment daily    - Set and communicate daily mobility goal to care team and patient/family/caregiver  - Collaborate with rehabilitation services on mobility goals if consulted  - Perform Range of Motion 2 times a day  - Reposition patient every 2 hours    - Dangle patient 2 times a day  - Stand patient 0 times a day  - Ambulate patient 0 times a day  - Out of bed to chair 0 times a day   - Out of bed for meals 0 times a day  - Out of bed for toileting  - Record patient progress and toleration of activity level   Outcome: Progressing  Goal: Patient will remain free of falls  Description: INTERVENTIONS:  - Educate patient/family on patient safety including physical limitations  - Instruct patient to call for assistance with activity   - Consult OT/PT to assist with strengthening/mobility   - Keep Call bell within reach  - Keep bed low and locked with side rails adjusted as appropriate  - Keep care items and personal belongings within reach  - Initiate and maintain comfort rounds  - Make Fall Risk Sign visible to staff  - Offer Toileting every 0 Hours, in advance of need  - Initiate/Maintain bed alarm  - Obtain necessary fall risk management equipment: 1  - Apply yellow socks and bracelet for high fall risk patients  - Consider moving patient to room near nurses station  Outcome: Progressing     Problem: DISCHARGE PLANNING  Goal: Discharge to home or other facility with appropriate resources  Description: INTERVENTIONS:  - Identify barriers to discharge w/patient and caregiver  - Arrange for needed discharge resources and transportation as appropriate  - Identify discharge learning needs (meds, wound care, etc )  - Arrange for interpretive services to assist at discharge as needed  - Refer to Case Management Department for coordinating discharge planning if the patient needs post-hospital services based on physician/advanced practitioner order or complex needs related to functional status, cognitive ability, or social support system  Outcome: Progressing     Problem: Knowledge Deficit  Goal: Patient/family/caregiver demonstrates understanding of disease process, treatment plan, medications, and discharge instructions  Description: Complete learning assessment and assess knowledge base    Interventions:  - Provide teaching at level of understanding  - Provide teaching via preferred learning methods  Outcome: Progressing

## 2023-01-19 NOTE — ASSESSMENT & PLAN NOTE
· She was intubated following the cardiac arrest  · We will continue vent support for now  · Our goal will be to maintain the oxygen saturation > 90%  · Daily wean

## 2023-01-19 NOTE — ASSESSMENT & PLAN NOTE
· In the setting of cardiac arrest, her lactic acid was elevated and now normalized  · We will continue to monitor her electrolytes and acid/base balance

## 2023-01-19 NOTE — PROGRESS NOTES
01/19/23 1200   Clinical Encounter Type   Visited With Family   Routine Visit Follow-up   Continue Visiting Yes

## 2023-01-19 NOTE — PROGRESS NOTES
Cardiology Progress Note - Arleen Dickey 89 y o  female MRN: [id number]    Unit/Bed#: ICU 12 Encounter: [id number]      Assessment & Plan:    Cardiac arrest [facility])  -Code Blue called at 7:43 AM on 1/18/2023  - During first pulse check patient reported being in V  fib, status post defib x1 with ROSC  - No evidence of ACS on postarrest EKG  -Started on amiodarone drip postarrest  - Troponin trend 20->151->287->1077->2369->1976->1770->1784  -Potassium noted to be 3 0 and magnesium 1 8 postarrest  - Troponin elevation likely due to type II MI in setting of severe sepsis and V  fib arrest  -arrest likely multifactorial in setting of sepsis from underlying bacteremia and electrolyte derangements  - Given her underlying PAD, likely has some degree of underlying CAD as well  -TTE 1/18/2023 was a limited study and technically difficult, EF around 50 to 55%    Sepsis due to urinary tract infection ([facility] )  - 2/2 blood cultures positive for gram-positive cocci in pairs and chains on 1/17/2023  -Lactic acid 6 7 postcode, improved to 1 5 on repeat - currently on antibiotics    Hypertension  - Home BP regimen on amlodipine 10 mg daily, lisinopril 40 mg daily, and Toprol-XL 25 mg daily    Acute respiratory failure with hypoxia (HCC)  -Intubated during code blue    PAD (peripheral artery disease) ([facility] )  - IR aortogram with runoffs 10/31/2022 underwent right CEA angioplasty/stents and left lower extremity SFA shock wave angioplasty/FATOU  -CTA abdomen pelvis 11/01/2022 showed right lower extremity flow limiting dissection flap in the right common femoral artery superimposed upon moderate to severe atherosclerotic disease  -Attempted endovascular repair with vascular surgery in 41/6/3945 complicated by atheroembolization to the right lower extremity requiring right lower extremity AKA and then subsequent left lower extremity AKA  -On Plavix 75 mg daily with Coumadin    HIT (heparin-induced thrombocytopenia)  -Currently anticoagulated on Coumadin    History of CVA (cerebrovascular accident)  -moderate sized acute/recent infarct of the right parietal lobe with local mass-effect CVA in the setting of HIT noted on CT head on 11/13/2022    Moderate protein-calorie malnutrition (HCC)    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 (HCC)    Metabolic encephalopathy    Fall    Metabolic acidosis     Summary:  - No significant ectopy noted on telemetry  - Continue with Plavix 75 mg daily and amiodarone drip at 0 5 mg/min  - If patient's INR becomes subtherapeutic, consider argatroban drip for anticoagulation, avoid heparin given history of HIT  - Monitor electrolytes closely, keep potassium greater than 4 0 magnesium greater than 2 0  -BP significantly elevated, recommend restarting her home antihypertensive medications including amlodipine 10 mg daily, lisinopril 40 mg daily and metoprolol, can use tartrate 12 5 mg twice daily    Subjective:   No significant events overnight  Unable to obtain ROS secondary to intubation and sedation  Objective:     Vitals: Blood pressure 157/55, pulse 84, temperature 99 2 °F (37 3 °C), temperature source Bladder, resp   rate 17, height 5' 4" (1 626 m), weight 72 1 kg (159 lb), SpO2 100 %, not currently breastfeeding , Body mass index is 27 29 kg/m² ,   Orthostatic Blood Pressures    Flowsheet Row Most Recent Value   Blood Pressure 157/55 filed at 01/18/2023 1500   Patient Position - Orthostatic VS Lying filed at 01/18/2023 1500            Intake/Output Summary (Last 24 hours) at 1/19/2023 1107  Last data filed at 1/19/2023 0600  Gross per 24 hour   Intake 4115 23 ml   Output 585 ml   Net 3530 23 ml           Physical Exam:    GEN: Amber Begin appears intubated and sedated  HEENT: anicteric, mucous membranes moist  NECK: Unable to assess JVD  HEART: Regular rate and rhythm, no significant audible murmurs  LUNGS: ET tube in place, clear to auscultation anteriorly  ABDOMEN: normal bowel sounds, soft, no tenderness, no distention  EXTREMITIES: s/p bilateral AKA, warm extremities  NEURO: Moves spontaneously  SKIN: normal without suspicious lesions on exposed skin      Current Facility-Administered Medications:   •  acetaminophen (TYLENOL) rectal suppository 650 mg, 650 mg, Rectal, Q6H PRN, Juliet Alcantara, DO, 650 mg at 23 2120  •  acetaminophen (TYLENOL) tablet 650 mg, 650 mg, Oral, Q6H PRN, Juliet Alcantara, DO, 650 mg at 23 0954  •  [] amiodarone (CORDARONE) 900 mg in dextrose 5 % 500 mL infusion, 1 mg/min, Intravenous, Continuous, Stopped at 23 1643 **FOLLOWED BY** amiodarone (CORDARONE) 900 mg in dextrose 5 % 500 mL infusion, 0 5 mg/min, Intravenous, Continuous, Fang TamiALEX mera, Last Rate: 16 7 mL/hr at 23 1644, 0 5 mg/min at 23 1644  •  buPROPion (WELLBUTRIN XL) 24 hr tablet 150 mg, 150 mg, Oral, Daily, Wan Alcantara DO, 150 mg at 23 0932  •  chlorhexidine (PERIDEX) 0 12 % oral rinse 15 mL, 15 mL, Mouth/Throat, Q12H [facility], Liz Barragan PA-C, 15 mL at 23 0932  •  clopidogrel (PLAVIX) tablet 75 mg, 75 mg, Oral, Daily, Wan Alcantara DO, 75 mg at 23 0932  •  clotrimazole-betamethasone (LOTRISONE) 1-0 05 % cream, , Topical, BID, Wan Alcantara DO, Given at 23 9256  •  fentaNYL 1000 mcg in sodium chloride 0 9% 100mL infusion, 50 mcg/hr, Intravenous, Continuous, Wan Alcantara DO, Last Rate: 5 mL/hr at 23 0122, 50 mcg/hr at 23 0122  •  fentanyl citrate (PF) 100 MCG/2ML 50 mcg, 50 mcg, Intravenous, Q2H PRN, Juliet Alcantara DO, 50 mcg at 23 0932  •  fluocinonide (LIDEX) 0 05 % ointment, , Topical, BID, Wan Alcantara DO, Given at 23 0933  •  insulin lispro (HumaLOG) 100 units/mL subcutaneous injection 1-5 Units, 1-5 Units, Subcutaneous, Q6H [facility] **AND** Fingerstick Glucose (POCT), , , Q6H, Karo Reeves PA-C  •  meropenem [facility]) 1,000 mg in sodium chloride 0 9 % 100 mL IVPB, 1,000 mg, Intravenous, Q8H, Wan Alcantara DO, Last Rate: 100 mL/hr at 01/19/23 0934, 1,000 mg at 01/19/23 0934  •  multi-electrolyte (PLASMALYTE-A/ISOLYTE-S PH 7 4) IV solution, 100 mL/hr, Intravenous, Continuous, Wan lAcantara DO, Paused at 01/19/23 8420  •  NOREPINEPHRINE 4 MG  ML NSS (CMPD ORDER) infusion, 1-30 mcg/min, Intravenous, Titrated, Nancy Matson MD, Held at 01/18/23 1441  •  pantoprazole (PROTONIX) injection 40 mg, 40 mg, Intravenous, Q24H [facility], ALEX Lee, 40 mg at 01/19/23 0932  •  perflutren lipid microsphere (DEFINITY) injection, 0 4 mL/min, Intravenous, Once in imaging, ALEX Lee  •  propofol (DIPRIVAN) 1000 mg in 100 mL infusion (premix), 5-50 mcg/kg/min, Intravenous, Titrated, Wan Alcantara DO, Last Rate: 21 6 mL/hr at 01/19/23 0844, 50 mcg/kg/min at 01/19/23 0844  •  sertraline (ZOLOFT) tablet 100 mg, 100 mg, Oral, Daily, Wan Alcantara DO, 100 mg at 01/19/23 0932  •  vancomycin (VANCOCIN) 1500 mg in sodium chloride 0 9% 250 mL IVPB, 1,500 mg, Intravenous, Q24H, Kylie Bhatti MD    Labs & Results:    Lab Results   Component Value Date    CKTOTAL 2,728 (H) 11/14/2022    CKTOTAL 1,911 (H) 11/09/2022    CKTOTAL 53 01/31/2020    CKMB 12 0 (H) 11/14/2022    CKMB 39 3 (H) 11/09/2022    CKMBINDEX <1 0 11/14/2022    CKMBINDEX 2 1 11/09/2022    TROPONINI <0 02 01/31/2020    TROPONINI <0 02 02/25/2018    TROPONINI <0 02 02/25/2018       Lab Results   Component Value Date    CALCIUM 7 4 (L) 01/19/2023    K 3 8 01/19/2023    CO2 21 01/19/2023     01/19/2023    BUN 23 01/19/2023    CREATININE 0 91 01/19/2023       Lab Results   Component Value Date    WBC 17 24 (H) 01/19/2023    HGB 7 2 (L) 01/19/2023    HCT 22 4 (L) 01/19/2023    MCV 83 01/19/2023     01/19/2023     Results from last 7 days   Lab Units 01/18/23  0530   INR  4 29*       No results found for: CHOL  Lab Results Component Value Date    HDL 28 (L) 11/14/2022    HDL 48 08/12/2021     Lab Results   Component Value Date    LDLCALC 55 11/14/2022    LDLCALC 122 (H) 08/12/2021     Lab Results   Component Value Date    TRIG 93 11/14/2022    TRIG 275 (H) 08/12/2021       Lab Results   Component Value Date    ALT 51 01/19/2023    AST 47 (H) 01/19/2023         EKG personally reviewed by )Dinah Paniagua MD  No acute changes   TELE: No significant arrhythmias seen on telemetry review

## 2023-01-19 NOTE — PROGRESS NOTES
Daniel Shah is a 65 y o  female who is currently ordered Vancomycin IV with management by the Pharmacy Consult service  Relevant clinical data and objective / subjective history reviewed  Vancomycin Assessment:  Indication and Goal AUC/Trough: Other, sepsis, -500, trough 15-20         2  Renal Function:  SCr: 0   91 mg/dL  CrCl: 63 2 mL/min  Renal replacement: Not on dialysis    Days of Therapy: 3    Vancomycin Plan:  New Dosinmg IV every 24 hours  Estimated AUC: 466 mcg*hr/mL  Estimated Trough: 12 9 mcg/mL  Next Level: random level 23 at 0600  Renal Function Monitoring: Daily BMP and [place] will continue to follow closely for s/sx of nephrotoxicity, infusion reactions and appropriateness of therapy  BMP and CBC will be ordered per protocol  We will continue to follow the patient’s culture results and clinical progress daily      Sammie Saravia, Pharmacist

## 2023-01-19 NOTE — ASSESSMENT & PLAN NOTE
· The etiology is not entirely known  DDX include primary cardiac event/ arrhythmia given her history of significant vascular disease, PE, Aspiration- PE is thought less likely given elevated INR, electrolyte abnormality, vs multifactorial 2/2 bacteremia    · We will check an EKG now- initial one following the arrest revealed No acute ST segment elevation but there is some evidence of up-sloping of her St segment suggesting ischemia  · amio started  · Cards following

## 2023-01-19 NOTE — ASSESSMENT & PLAN NOTE
Lab Results   Component Value Date    HGBA1C 9 8 (H) 10/25/2022       Recent Labs     01/18/23  2148 01/18/23  2254 01/19/23  0002 01/19/23  0200   POCGLU 123 129 112 90       Blood Sugar Average: Last 72 hrs:  (P) 187     -d/c insulin gtt and start ssi

## 2023-01-19 NOTE — ASSESSMENT & PLAN NOTE
· The patient was admitted with sepsis on 1/17  On the morning of 1/18 was found unresponsive and without a pulse   ROSC was obtained following 1mg epinephrine and defibrillation with 200 joules  · We will change her antibiotics to meropenum based on her sensitivities  · She is noted to have polymicrobial bacteremia with enterococcus and enterobacter   · monitor her fever curve, procal and WBC level  · Her arrest may be related to ischemia in the setting of infection but given her significant vascular issues and coronary calcifications seen on CT a w/u is needed   · A new infection is felt less likely to be a contributing factor  · Pt febrile 103 o/n  · Will need cathie prior to extubation

## 2023-01-20 ENCOUNTER — APPOINTMENT (INPATIENT)
Dept: CT IMAGING | Facility: HOSPITAL | Age: 63
End: 2023-01-20

## 2023-01-20 PROBLEM — R78.81 BACTEREMIA: Status: ACTIVE | Noted: 2023-01-20

## 2023-01-20 LAB
ABO GROUP BLD: NORMAL
ALBUMIN SERPL BCP-MCNC: 2.3 G/DL (ref 3.5–5)
ALP SERPL-CCNC: 170 U/L (ref 34–104)
ALT SERPL W P-5'-P-CCNC: 40 U/L (ref 7–52)
ANION GAP SERPL CALCULATED.3IONS-SCNC: 12 MMOL/L (ref 4–13)
ANION GAP SERPL CALCULATED.3IONS-SCNC: 9 MMOL/L (ref 4–13)
AST SERPL W P-5'-P-CCNC: 39 U/L (ref 13–39)
BILIRUB SERPL-MCNC: 0.38 MG/DL (ref 0.2–1)
BLD GP AB SCN SERPL QL: NEGATIVE
BUN SERPL-MCNC: 24 MG/DL (ref 5–25)
BUN SERPL-MCNC: 24 MG/DL (ref 5–25)
CALCIUM ALBUM COR SERPL-MCNC: 8.7 MG/DL (ref 8.3–10.1)
CALCIUM SERPL-MCNC: 7.3 MG/DL (ref 8.4–10.2)
CALCIUM SERPL-MCNC: 7.3 MG/DL (ref 8.4–10.2)
CHLORIDE SERPL-SCNC: 101 MMOL/L (ref 96–108)
CHLORIDE SERPL-SCNC: 103 MMOL/L (ref 96–108)
CO2 SERPL-SCNC: 22 MMOL/L (ref 21–32)
CO2 SERPL-SCNC: 23 MMOL/L (ref 21–32)
CREAT SERPL-MCNC: 0.84 MG/DL (ref 0.6–1.3)
CREAT SERPL-MCNC: 0.84 MG/DL (ref 0.6–1.3)
ERYTHROCYTE [DISTWIDTH] IN BLOOD BY AUTOMATED COUNT: 17.4 % (ref 11.6–15.1)
GFR SERPL CREATININE-BSD FRML MDRD: 75 ML/MIN/1.73SQ M
GFR SERPL CREATININE-BSD FRML MDRD: 75 ML/MIN/1.73SQ M
GLUCOSE SERPL-MCNC: 104 MG/DL (ref 65–140)
GLUCOSE SERPL-MCNC: 112 MG/DL (ref 65–140)
GLUCOSE SERPL-MCNC: 114 MG/DL (ref 65–140)
GLUCOSE SERPL-MCNC: 121 MG/DL (ref 65–140)
GLUCOSE SERPL-MCNC: 132 MG/DL (ref 65–140)
HCT VFR BLD AUTO: 17.5 % (ref 34.8–46.1)
HCT VFR BLD AUTO: 19.1 % (ref 34.8–46.1)
HCT VFR BLD AUTO: 22.9 % (ref 34.8–46.1)
HGB BLD-MCNC: 5.4 G/DL (ref 11.5–15.4)
HGB BLD-MCNC: 6.4 G/DL (ref 11.5–15.4)
HGB BLD-MCNC: 7.7 G/DL (ref 11.5–15.4)
INR PPP: 2.97 (ref 0.84–1.19)
MAGNESIUM SERPL-MCNC: 2.1 MG/DL (ref 1.9–2.7)
MCH RBC QN AUTO: 26.3 PG (ref 26.8–34.3)
MCHC RBC AUTO-ENTMCNC: 30.9 G/DL (ref 31.4–37.4)
MCV RBC AUTO: 85 FL (ref 82–98)
MRSA NOSE QL CULT: NORMAL
NRBC BLD AUTO-RTO: 0 /100 WBCS
PHOSPHATE SERPL-MCNC: 3.9 MG/DL (ref 2.3–4.1)
PLATELET # BLD AUTO: 248 THOUSANDS/UL (ref 149–390)
PMV BLD AUTO: 9.4 FL (ref 8.9–12.7)
POTASSIUM SERPL-SCNC: 2.9 MMOL/L (ref 3.5–5.3)
POTASSIUM SERPL-SCNC: 3.1 MMOL/L (ref 3.5–5.3)
PROT SERPL-MCNC: 5 G/DL (ref 6.4–8.4)
PROTHROMBIN TIME: 30.7 SECONDS (ref 11.6–14.5)
RBC # BLD AUTO: 2.05 MILLION/UL (ref 3.81–5.12)
RH BLD: NEGATIVE
SODIUM SERPL-SCNC: 135 MMOL/L (ref 135–147)
SODIUM SERPL-SCNC: 135 MMOL/L (ref 135–147)
SPECIMEN EXPIRATION DATE: NORMAL
VANCOMYCIN SERPL-MCNC: 24.1 UG/ML (ref 10–20)
WBC # BLD AUTO: 13.89 THOUSAND/UL (ref 4.31–10.16)

## 2023-01-20 PROCEDURE — 30233N1 TRANSFUSION OF NONAUTOLOGOUS RED BLOOD CELLS INTO PERIPHERAL VEIN, PERCUTANEOUS APPROACH: ICD-10-PCS | Performed by: INTERNAL MEDICINE

## 2023-01-20 RX ORDER — SENNOSIDES 8.6 MG
1 TABLET ORAL
Status: DISCONTINUED | OUTPATIENT
Start: 2023-01-20 | End: 2023-02-03 | Stop reason: HOSPADM

## 2023-01-20 RX ORDER — MAGNESIUM SULFATE 1 G/100ML
1 INJECTION INTRAVENOUS ONCE
Status: COMPLETED | OUTPATIENT
Start: 2023-01-20 | End: 2023-01-20

## 2023-01-20 RX ORDER — POLYETHYLENE GLYCOL 3350 17 G/17G
17 POWDER, FOR SOLUTION ORAL DAILY
Status: DISCONTINUED | OUTPATIENT
Start: 2023-01-20 | End: 2023-01-25

## 2023-01-20 RX ORDER — POTASSIUM CHLORIDE 14.9 MG/ML
20 INJECTION INTRAVENOUS ONCE
Status: DISCONTINUED | OUTPATIENT
Start: 2023-01-20 | End: 2023-01-20

## 2023-01-20 RX ORDER — POTASSIUM CHLORIDE 20 MEQ/1
40 TABLET, EXTENDED RELEASE ORAL 2 TIMES DAILY
Status: DISCONTINUED | OUTPATIENT
Start: 2023-01-20 | End: 2023-01-20

## 2023-01-20 RX ORDER — VANCOMYCIN HYDROCHLORIDE 1 G/200ML
1000 INJECTION, SOLUTION INTRAVENOUS EVERY 24 HOURS
Status: DISCONTINUED | OUTPATIENT
Start: 2023-01-20 | End: 2023-01-22

## 2023-01-20 RX ORDER — LABETALOL HYDROCHLORIDE 5 MG/ML
10 INJECTION, SOLUTION INTRAVENOUS ONCE
Status: COMPLETED | OUTPATIENT
Start: 2023-01-20 | End: 2023-01-20

## 2023-01-20 RX ORDER — BISACODYL 10 MG
10 SUPPOSITORY, RECTAL RECTAL DAILY PRN
Status: DISCONTINUED | OUTPATIENT
Start: 2023-01-20 | End: 2023-02-03 | Stop reason: HOSPADM

## 2023-01-20 RX ORDER — POTASSIUM CHLORIDE 20 MEQ/1
40 TABLET, EXTENDED RELEASE ORAL ONCE
Status: DISCONTINUED | OUTPATIENT
Start: 2023-01-20 | End: 2023-01-20

## 2023-01-20 RX ORDER — POTASSIUM CHLORIDE 20MEQ/15ML
40 LIQUID (ML) ORAL 2 TIMES DAILY
Status: COMPLETED | OUTPATIENT
Start: 2023-01-20 | End: 2023-01-20

## 2023-01-20 RX ADMIN — VANCOMYCIN HYDROCHLORIDE 1000 MG: 1 INJECTION, SOLUTION INTRAVENOUS at 22:00

## 2023-01-20 RX ADMIN — PANTOPRAZOLE SODIUM 40 MG: 40 INJECTION, POWDER, FOR SOLUTION INTRAVENOUS at 09:11

## 2023-01-20 RX ADMIN — CHLORHEXIDINE GLUCONATE 0.12% ORAL RINSE 15 ML: 1.2 LIQUID ORAL at 09:10

## 2023-01-20 RX ADMIN — POTASSIUM CHLORIDE 40 MEQ: 20 SOLUTION ORAL at 09:11

## 2023-01-20 RX ADMIN — FLUOCINONIDE: 0.5 OINTMENT TOPICAL at 18:46

## 2023-01-20 RX ADMIN — FENTANYL CITRATE 50 MCG: 50 INJECTION INTRAMUSCULAR; INTRAVENOUS at 17:56

## 2023-01-20 RX ADMIN — SENNOSIDES 8.6 MG: 8.6 TABLET ORAL at 22:00

## 2023-01-20 RX ADMIN — CLOTRIMAZOLE AND BETAMETHASONE DIPROPIONATE: 10; .64 CREAM TOPICAL at 18:46

## 2023-01-20 RX ADMIN — CHLORHEXIDINE GLUCONATE 0.12% ORAL RINSE 15 ML: 1.2 LIQUID ORAL at 22:00

## 2023-01-20 RX ADMIN — FENTANYL CITRATE 50 MCG: 50 INJECTION INTRAMUSCULAR; INTRAVENOUS at 04:28

## 2023-01-20 RX ADMIN — LISINOPRIL 40 MG: 20 TABLET ORAL at 09:10

## 2023-01-20 RX ADMIN — MEROPENEM 1000 MG: 1 INJECTION, POWDER, FOR SOLUTION INTRAVENOUS at 01:21

## 2023-01-20 RX ADMIN — LABETALOL HYDROCHLORIDE 10 MG: 5 INJECTION, SOLUTION INTRAVENOUS at 16:38

## 2023-01-20 RX ADMIN — MEROPENEM 1000 MG: 1 INJECTION, POWDER, FOR SOLUTION INTRAVENOUS at 09:46

## 2023-01-20 RX ADMIN — FENTANYL CITRATE 50 MCG: 50 INJECTION INTRAMUSCULAR; INTRAVENOUS at 13:03

## 2023-01-20 RX ADMIN — PROPOFOL 50 MCG/KG/MIN: 10 INJECTION, EMULSION INTRAVENOUS at 16:37

## 2023-01-20 RX ADMIN — PROPOFOL 50 MCG/KG/MIN: 10 INJECTION, EMULSION INTRAVENOUS at 01:14

## 2023-01-20 RX ADMIN — AMIODARONE HYDROCHLORIDE 0.5 MG/MIN: 50 INJECTION, SOLUTION INTRAVENOUS at 16:47

## 2023-01-20 RX ADMIN — PROPOFOL 50 MCG/KG/MIN: 10 INJECTION, EMULSION INTRAVENOUS at 05:59

## 2023-01-20 RX ADMIN — AMLODIPINE BESYLATE 10 MG: 10 TABLET ORAL at 09:10

## 2023-01-20 RX ADMIN — MEROPENEM 1000 MG: 1 INJECTION, POWDER, FOR SOLUTION INTRAVENOUS at 18:41

## 2023-01-20 RX ADMIN — SERTRALINE HYDROCHLORIDE 100 MG: 100 TABLET, FILM COATED ORAL at 09:11

## 2023-01-20 RX ADMIN — POTASSIUM CHLORIDE 40 MEQ: 20 SOLUTION ORAL at 17:56

## 2023-01-20 RX ADMIN — PROPOFOL 50 MCG/KG/MIN: 10 INJECTION, EMULSION INTRAVENOUS at 10:42

## 2023-01-20 RX ADMIN — MAGNESIUM SULFATE HEPTAHYDRATE 1 G: 1 INJECTION, SOLUTION INTRAVENOUS at 11:24

## 2023-01-20 RX ADMIN — POLYETHYLENE GLYCOL 3350 17 G: 17 POWDER, FOR SOLUTION ORAL at 22:00

## 2023-01-20 RX ADMIN — Medication 50 MCG/HR: at 16:37

## 2023-01-20 RX ADMIN — FENTANYL CITRATE 50 MCG: 50 INJECTION INTRAMUSCULAR; INTRAVENOUS at 10:18

## 2023-01-20 RX ADMIN — CLOPIDOGREL BISULFATE 75 MG: 75 TABLET ORAL at 09:10

## 2023-01-20 RX ADMIN — METOPROLOL TARTRATE 25 MG: 25 TABLET, FILM COATED ORAL at 09:11

## 2023-01-20 NOTE — ASSESSMENT & PLAN NOTE
Lab Results   Component Value Date    HGBA1C 9 8 (H) 10/25/2022       Recent Labs     01/19/23  1731 01/19/23  2333 01/20/23  0548 01/20/23  1218   POCGLU 147* 123 132 114       Blood Sugar Average: Last 72 hrs:  (P) 013 [id number]     -d/c insulin gtt and start ssi

## 2023-01-20 NOTE — ASSESSMENT & PLAN NOTE
· Etiology, possibly hypovolemic due to sepsis but was resuscitated and had BP ranges that were on the higher end   · She is on seroquel, zoloft and  lisinopril which can all contribute to SIADH development, particularly the zoloft  · We will continue to monitor her sodium level and hold her zoloft, seroquel and lisinopril for now  ·

## 2023-01-20 NOTE — PLAN OF CARE
Problem: MOBILITY - ADULT  Goal: Maintain or return to baseline ADL function  Description: INTERVENTIONS:  -  Assess patient's ability to carry out ADLs; assess patient's baseline for ADL function and identify physical deficits which impact ability to perform ADLs (bathing, care of mouth/teeth, toileting, grooming, dressing, etc )  - Assess/evaluate cause of self-care deficits   - Assess range of motion  - Assess patient's mobility; develop plan if impaired  - Assess patient's need for assistive devices and provide as appropriate  - Encourage maximum independence but intervene and supervise when necessary  - Involve family in performance of ADLs  - Assess for home care needs following discharge   - Consider OT consult to assist with ADL evaluation and planning for discharge  - Provide patient education as appropriate  Outcome: Progressing  Goal: Maintains/Returns to pre admission functional level  Description: INTERVENTIONS:  - Perform BMAT or MOVE assessment daily    - Set and communicate daily mobility goal to care team and patient/family/caregiver     - Collaborate with rehabilitation services on mobility goals if consulted  - Out of bed for toileting  - Record patient progress and toleration of activity level   Outcome: Progressing     Problem: Prexisting or High Potential for Compromised Skin Integrity  Goal: Skin integrity is maintained or improved  Description: INTERVENTIONS:  - Identify patients at risk for skin breakdown  - Assess and monitor skin integrity  - Assess and monitor nutrition and hydration status  - Monitor labs   - Assess for incontinence   - Turn and reposition patient  - Assist with mobility/ambulation  - Relieve pressure over bony prominences  - Avoid friction and shearing  - Provide appropriate hygiene as needed including keeping skin clean and dry  - Evaluate need for skin moisturizer/barrier cream  - Collaborate with interdisciplinary team   - Patient/family teaching  - Consider wound care consult   Outcome: Progressing     Problem: PAIN - ADULT  Goal: Verbalizes/displays adequate comfort level or baseline comfort level  Description: Interventions:  - Encourage patient to monitor pain and request assistance  - Assess pain using appropriate pain scale  - Administer analgesics based on type and severity of pain and evaluate response  - Implement non-pharmacological measures as appropriate and evaluate response  - Consider cultural and social influences on pain and pain management  - Notify physician/advanced practitioner if interventions unsuccessful or patient reports new pain  Outcome: Progressing     Problem: INFECTION - ADULT  Goal: Absence or prevention of progression during hospitalization  Description: INTERVENTIONS:  - Assess and monitor for signs and symptoms of infection  - Monitor lab/diagnostic results  - Monitor all insertion sites, i e  indwelling lines, tubes, and drains  - Monitor endotracheal if appropriate and nasal secretions for changes in amount and color  - [place] appropriate cooling/warming therapies per order  - Administer medications as ordered  - Instruct and encourage patient and family to use good hand hygiene technique  - Identify and instruct in appropriate isolation precautions for identified infection/condition  Outcome: Progressing  Goal: Absence of fever/infection during neutropenic period  Description: INTERVENTIONS:  - Monitor WBC    Outcome: Progressing     Problem: SAFETY ADULT  Goal: Maintain or return to baseline ADL function  Description: INTERVENTIONS:  -  Assess patient's ability to carry out ADLs; assess patient's baseline for ADL function and identify physical deficits which impact ability to perform ADLs (bathing, care of mouth/teeth, toileting, grooming, dressing, etc )  - Assess/evaluate cause of self-care deficits   - Assess range of motion  - Assess patient's mobility; develop plan if impaired  - Assess patient's need for assistive devices and provide as appropriate  - Encourage maximum independence but intervene and supervise when necessary  - Involve family in performance of ADLs  - Assess for home care needs following discharge   - Consider OT consult to assist with ADL evaluation and planning for discharge  - Provide patient education as appropriate  Outcome: Progressing  Goal: Maintains/Returns to pre admission functional level  Description: INTERVENTIONS:  - Perform BMAT or MOVE assessment daily    - Set and communicate daily mobility goal to care team and patient/family/caregiver     - Collaborate with rehabilitation services on mobility goals if consulted  - Out of bed for toileting  - Record patient progress and toleration of activity level   Outcome: Progressing  Goal: Patient will remain free of falls  Description: INTERVENTIONS:  - Educate patient/family on patient safety including physical limitations  - Instruct patient to call for assistance with activity   - Consult OT/PT to assist with strengthening/mobility   - Keep Call bell within reach  - Keep bed low and locked with side rails adjusted as appropriate  - Keep care items and personal belongings within reach  - Initiate and maintain comfort rounds  - Make Fall Risk Sign visible to staff  - Apply yellow socks and bracelet for high fall risk patients  - Consider moving patient to room near nurses station  Outcome: Progressing     Problem: DISCHARGE PLANNING  Goal: Discharge to home or other facility with appropriate resources  Description: INTERVENTIONS:  - Identify barriers to discharge w/patient and caregiver  - Arrange for needed discharge resources and transportation as appropriate  - Identify discharge learning needs (meds, wound care, etc )  - Arrange for interpretive services to assist at discharge as needed  - Refer to Case Management Department for coordinating discharge planning if the patient needs post-hospital services based on physician/advanced practitioner order or complex needs related to functional status, cognitive ability, or social support system  Outcome: Progressing     Problem: Knowledge Deficit  Goal: Patient/family/caregiver demonstrates understanding of disease process, treatment plan, medications, and discharge instructions  Description: Complete learning assessment and assess knowledge base  Interventions:  - Provide teaching at level of understanding  - Provide teaching via preferred learning methods  Outcome: Progressing     Problem: Nutrition/Hydration-ADULT  Goal: Nutrient/Hydration intake appropriate for improving, restoring or maintaining nutritional needs  Description: Monitor and assess patient's nutrition/hydration status for malnutrition  Collaborate with interdisciplinary team and initiate plan and interventions as ordered  Monitor patient's weight and dietary intake as ordered or per policy  Utilize nutrition screening tool and intervene as necessary  Determine patient's food preferences and provide high-protein, high-caloric foods as appropriate       INTERVENTIONS:  - Monitor oral intake, urinary output, labs, and treatment plans  - Assess nutrition and hydration status and recommend course of action  - Evaluate amount of meals eaten  - Assist patient with eating if necessary   - Allow adequate time for meals  - Recommend/ encourage appropriate diets, oral nutritional supplements, and vitamin/mineral supplements  - Order, calculate, and assess calorie counts as needed  - Recommend, monitor, and adjust tube feedings and TPN/PPN based on assessed needs  - Assess need for intravenous fluids  - Provide specific nutrition/hydration education as appropriate  - Include patient/family/caregiver in decisions related to nutrition  Outcome: Progressing     Problem: SAFETY,RESTRAINT: NV/NON-SELF DESTRUCTIVE BEHAVIOR  Goal: Remains free of harm/injury (restraint for non violent/non self-detsructive behavior)  Description: INTERVENTIONS:  - Instruct patient/family regarding restraint use   - Assess and monitor physiologic and psychological status   - Provide interventions and comfort measures to meet assessed patient needs   - Identify and implement measures to help patient regain control  - Assess readiness for release of restraint   Outcome: Progressing  Goal: Returns to optimal restraint-free functioning  Description: INTERVENTIONS:  - Assess the patient's behavior and symptoms that indicate continued need for restraint  - Identify and implement measures to help patient regain control  - Assess readiness for release of restraint   Outcome: Progressing

## 2023-01-20 NOTE — PROGRESS NOTES
[address] [facility]  Progress Note - [address] [facility] [address] 1960, 65 y o  female MRN: [id number]  Unit/Bed#: ICU 12 Encounter: [id number]  Primary Care Provider: ALEX Barfield   Date and time admitted to hospital: 1/17/2023  3:48 PM    Cardiac arrest [facility])  Assessment & Plan  · The etiology is not entirely known  DDX include primary cardiac event/ arrhythmia given her history of significant vascular disease, PE, Aspiration- PE is thought less likely given elevated INR, electrolyte abnormality, vs multifactorial 2/2 bacteremia  · We will check an EKG now- initial one following the arrest revealed No acute ST segment elevation but there is some evidence of up-sloping of her St segment suggesting ischemia  · amio started  · Cards following    * Sepsis due to urinary tract infection [facility])  Assessment & Plan  · The patient was admitted with sepsis on 1/17  On the morning of 1/18 was found unresponsive and without a pulse  ROSC was obtained following 1mg epinephrine and defibrillation with 200 joules  · We will change her antibiotics to meropenum based on her sensitivities  · She is noted to have polymicrobial bacteremia with enterococcus and enterobacter   · monitor her fever curve, procal and WBC level  · Her arrest may be related to ischemia in the setting of infection but given her significant vascular issues and coronary calcifications seen on CT a w/u is needed   · A new infection is felt less likely to be a contributing factor  · LEON scheduled for monday     Troponin level elevated  Assessment & Plan  · Type II MI likely 2/2 demand  · Cardiology following  No immediate ischemic w/u needed at this time   Will need after acute illness  · ECHO 1/18- ef 27-62%      Metabolic acidosis  Assessment & Plan  · In the setting of cardiac arrest, her lactic acid was elevated and now normalized  · We will continue to monitor her electrolytes and acid/base balance    Hyponatremia  Assessment & Plan    · Etiology, possibly hypovolemic due to sepsis but was resuscitated and had BP ranges that were on the higher end   · She is on seroquel, zoloft and  lisinopril which can all contribute to SIADH development, particularly the zoloft  · We will continue to monitor her sodium level and hold her zoloft, seroquel and lisinopril for now  ·     Metabolic encephalopathy  Assessment & Plan  -cont neuro checks  -ct 1/18- no acute finding    PAD (peripheral artery disease) ([facility])  Assessment & Plan  -cont plavix for now    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 [facility])  Assessment & Plan  Lab Results   Component Value Date    HGBA1C 9 8 (H) 10/25/2022       Recent Labs     01/19/23  1731 01/19/23  2333 01/20/23  0548 01/20/23  1218   POCGLU 147* 123 132 114       Blood Sugar Average: Last 72 hrs:  (P) 691 [id number]     -d/c insulin gtt and start ssi      Moderate protein-calorie malnutrition ([facility] [address] )  Assessment & Plan  Malnutrition Findings:                                   BMI Findings: Body mass index is 27 29 kg/m²  -start tube feeds today if stays intubated      Lab Results   Component Value Date    HGBA1C 9 8 (H) 10/25/2022     ----------------------------------------------------------------------------------------  HPI/24hr events: No acute events overnight  Patient was hemodynamically stable      Patient appropriate for transfer out of the ICU today?: No  Disposition: Admit to Critical Care   Code Status: Level 1 - Full Code  ---------------------------------------------------------------------------------------  SUBJECTIVE  Patient is sedated and intubated     Review of Systems  Review of systems was unable to be performed secondary to intubated and sedated   ---------------------------------------------------------------------------------------  OBJECTIVE    Vitals   Vitals:    01/20/23 1600 01/20/23 1642 01/20/23 1645 23 1646   BP:  140/52     Pulse: 80 74 76 90   Resp: 17  19 (!) 11   Temp:  99 5 °F (37 5 °C)     TempSrc:       SpO2: 100% 100% 99% 100%   Weight:       Height:         Temp (24hrs), Av 1 °F (37 3 °C), Min:98 6 °F (37 °C), Max:99 6 °F (37 6 °C)  Current: Temperature: 99 5 °F (37 5 °C)  Arterial Line BP: 132/50  Arterial Line MAP (mmHg): 70 mmHg    Respiratory:  SpO2: SpO2: 100 %       Invasive/non-invasive ventilation settings   Respiratory    Lab Data (Last 4 hours)    None         O2/Vent Data (Last 4 hours)    None                Physical Exam  Vitals and nursing note reviewed  Constitutional:       General: She is not in acute distress  Appearance: She is well-developed  HENT:      Head: Normocephalic and atraumatic  Eyes:      Conjunctiva/sclera: Conjunctivae normal    Cardiovascular:      Rate and Rhythm: Normal rate and regular rhythm  Heart sounds: No murmur heard  Pulmonary:      Effort: Pulmonary effort is normal  No respiratory distress  Breath sounds: Normal breath sounds  Abdominal:      Palpations: Abdomen is soft  Tenderness: There is no abdominal tenderness  Musculoskeletal:         General: No swelling  Cervical back: Neck supple  Skin:     General: Skin is warm and dry  Capillary Refill: Capillary refill takes less than 2 seconds  Neurological:      Mental Status: She is alert     Psychiatric:         Mood and Affect: Mood normal              Laboratory and Diagnostics:  Results from last 7 days   Lab Units 23  1501 23  0544 23  0448 23  1935 23  0832 23  0530 23  1605   WBC Thousand/uL  --  13 89* 17 24* 14 53* 26 92* 16 42* 16 48*   HEMOGLOBIN g/dL 6 4* 5 4* 7 2* 7 7* 8 9* 9 6* 9 7*   HEMATOCRIT % 19 1* 17 5* 22 4* 24 8* 29 0* 30 5* 31 2*   PLATELETS Thousands/uL  --  248 232 253 332 267 269   NEUTROS PCT %  --   --  81* 83*  --  89* 88*   MONOS PCT %  --   --  11 10  --  5 6   MONO PCT %  --   -- --   --  8  --   --      Results from last 7 days   Lab Units 01/20/23  1501 01/20/23  0544 01/19/23  0448 01/18/23  1935 01/18/23  0832 01/18/23  0530 01/17/23  1605   SODIUM mmol/L 135 135 136 137 132* 133* 132*   POTASSIUM mmol/L 3 1* 2 9* 3 8 3 3* 3 0* 2 9* 3 5   CHLORIDE mmol/L 103 101 103 105 97 96 98   CO2 mmol/L 23 22 21 23 16* 21 22   ANION GAP mmol/L 9 12 12 9 19* 16* 12   BUN mg/dL 24 24 23 22 15 13 20   CREATININE mg/dL 0 84 0 84 0 91 0 97 0 82 0 68 0 82   CALCIUM mg/dL 7 3* 7 3* 7 4* 7 5* 7 1* 7 9* 8 4   GLUCOSE RANDOM mg/dL 104 112 112 151* 351* 210* 95   ALT U/L  --  40 51 71* 101* 10 9   AST U/L  --  39 47* 89* 174* 20 15   ALK PHOS U/L  --  170* 121* 134* 132* 140* 131*   ALBUMIN g/dL  --  2 3* 2 5* 2 6* 2 7* 3 1* 3 2*   TOTAL BILIRUBIN mg/dL  --  0 38 0 36 0 37 0 49 0 52 0 37     Results from last 7 days   Lab Units 01/20/23  0544 01/19/23  1242 01/18/23  0832 01/17/23  1605   MAGNESIUM mg/dL 2 1 2 4 1 8* 1 5*   PHOSPHORUS mg/dL 3 9 3 7 5 2*  --       Results from last 7 days   Lab Units 01/20/23  0905 01/18/23  0530 01/17/23  1605   INR  2 97* 4 29* 2 96*   PTT seconds  --   --  57*          Results from last 7 days   Lab Units 01/18/23  1050 01/18/23  0834 01/17/23  1605   LACTIC ACID mmol/L 1 5 6 7* 1 1     ABG:  Results from last 7 days   Lab Units 01/18/23  1036   PH ART  7 421   PCO2 ART mm Hg 29 7*   PO2 ART mm Hg 223 0*   HCO3 ART mmol/L 18 9*   BASE EXC ART mmol/L -4 4   ABG SOURCE  Line, Arterial     VBG:  Results from last 7 days   Lab Units 01/18/23  1036 01/17/23  1605   PH JORDEN   --  7 488*   PCO2 JORDEN mm Hg  --  29 3*   PO2 JORDEN mm Hg  --  54 0*   HCO3 JORDEN mmol/L  --  21 7*   BASE EXC JORDEN mmol/L  --  -0 9   ABG SOURCE  Line, Arterial  --      Results from last 7 days   Lab Units 01/17/23  2116 01/17/23  1605   PROCALCITONIN ng/ml 1 37* 0 27*       Micro  Results from last 7 days   Lab Units 01/19/23  1226 01/19/23  0019 01/17/23  1641 01/17/23  1606 01/17/23  1553   BLOOD CULTURE Received in Microbiology Lab  Culture in Progress  Received in Microbiology Lab  Culture in Progress  --   --  Enterobacter cloacae*  Enterococcus faecalis* Enterococcus faecalis*   GRAM STAIN RESULT   --   --   --  Gram negative rods*  Gram positive cocci in pairs and chains* Gram positive cocci in pairs and chains*   URINE CULTURE   --   --  >100,000 cfu/ml Enterococcus faecalis*  <10,000 cfu/ml Citrobacter freundii*  <10,000 cfu/ml Klebsiella variicola*  --   --    MRSA CULTURE ONLY   --  No Methicillin Resistant Staphlyococcus aureus (MRSA) isolated  --   --   --        EKG: NA  Imaging: I have personally reviewed pertinent reports  Intake and Output  I/O       01/18 0701 01/19 0700 01/19 0701 01/20 0700 01/20 0701 01/21 0700    I V  (mL/kg) 3165 2 (43 9) 1039 2 (14 4) 541 7 (7 5)    Blood   383 3    NG/GT   150    IV Piggyback 1150 250 100    Total Intake(mL/kg) 4315 2 (59 9) 1289 2 (17 9) 1175 (16 3)    Urine (mL/kg/hr) 1935 (1 1) 1250 (0 7) 200 (0 3)    Emesis/NG output  300     Stool  0     Total Output 1935 1550 200    Net +2380 2 -260 8 +975           Unmeasured Stool Occurrence  1 x           Height and Weights   Height: 5' 4" (162 6 cm)  IBW (Ideal Body Weight): 54 7 kg  Body mass index is 27 29 kg/m²  Weight (last 2 days)     Date/Time Weight    01/18/23 1100 72 1 (159)            Nutrition       Diet Orders   (From admission, onward)             Start     Ordered    01/18/23 0258  Diet NPO  Diet effective now        References:    Nutrtion Support Algorithm Enteral vs  Parenteral   Question Answer Comment   Diet Type NPO    RD to adjust diet per protocol?  Yes        01/18/23 0257                  Active Medications  Scheduled Meds:  Current Facility-Administered Medications   Medication Dose Route Frequency Provider Last Rate   • acetaminophen  650 mg Rectal Q6H PRN Vlad Alcantara DO     • acetaminophen  650 mg Oral Q6H PRN Wan Alcantara DO     • amiodarone  0 5 mg/min Intravenous Continuous ALEX Leyva 0 5 mg/min (01/20/23 1647)   • amLODIPine  10 mg Oral Daily ALEX Leyva     • buPROPion  150 mg Oral Daily Wan Alcantara DO     • chlorhexidine  15 mL Mouth/Throat Q12H [address] Le Babinski, PA-C     • clopidogrel  75 mg Oral Daily Wan Alcantara DO     • clotrimazole-betamethasone   Topical BID Wan Alcantara DO     • fentaNYL  50 mcg/hr Intravenous Continuous Wan Alcantara DO 50 mcg/hr (01/20/23 1637)   • fentanyl citrate (PF)  50 mcg Intravenous Q2H PRN Wan Alcantara DO     • fluocinonide   Topical BID Wan Alcantraa DO     • insulin lispro  1-5 Units Subcutaneous Q6H [address] Le Babinski, PA-C     • lisinopril  40 mg Oral Daily ALEX Leyva     • meropenem  1,000 mg Intravenous Q8H Wan Alcantara DO Stopped (01/20/23 1121)   • metoprolol tartrate  25 mg Oral Q12H [address] ALEX Leyva     • pantoprazole  40 mg Intravenous Q24H [address] ALEX Portillo     • perflutren lipid microsphere  0 4 mL/min Intravenous Once in imaging ALEX Leyva     • potassium chloride  40 mEq Oral BID Mega Fernandes MD     • propofol  5-50 mcg/kg/min Intravenous Titrated Wan Alcantara DO 50 mcg/kg/min (01/20/23 1637)   • sertraline  100 mg Oral Daily Wan Alcantara DO     • vancomycin  1,000 mg Intravenous Q24H Fiona Burt MD       Continuous Infusions:  amiodarone, 0 5 mg/min, Last Rate: 0 5 mg/min (01/20/23 1647)  fentaNYL, 50 mcg/hr, Last Rate: 50 mcg/hr (01/20/23 1637)  propofol, 5-50 mcg/kg/min, Last Rate: 50 mcg/kg/min (01/20/23 1637)      PRN Meds:   acetaminophen, 650 mg, Q6H PRN  acetaminophen, 650 mg, Q6H PRN  fentanyl citrate (PF), 50 mcg, Q2H PRN  perflutren lipid microsphere, 0 4 mL/min, Once in imaging        Invasive Devices Review  Invasive Devices     Central Venous Catheter Line  Duration           CVC Central Lines 01/18/23 2 days          Peripheral Intravenous Line  Duration Peripheral IV 01/17/23 Left Antecubital 3 days    Peripheral IV 01/20/23 Left Forearm <1 day    Peripheral IV 01/20/23 Right Forearm <1 day    Peripheral IV 01/20/23 Upper;Medial Arm <1 day          Arterial Line  Duration           Arterial Line 01/18/23 Radial 2 days          Drain  Duration           Urethral Catheter Temperature probe 16 Fr  3 days    NG/OG/Enteral Tube Orogastric Left mouth 2 days          Airway  Duration           ETT  Cuffed 7 5 mm 2 days                Rationale for remaining devices: NA  ---------------------------------------------------------------------------------------  Advance Directive and Living Will:      Power of : Yes  POLST:    ---------------------------------------------------------------------------------------  Care Time Delivered: >30 minutes         Migdalia Self MD      Portions of the record may have been created with voice recognition software  Occasional wrong word or "sound a like" substitutions may have occurred due to the inherent limitations of voice recognition software    Read the chart carefully and recognize, using context, where substitutions have occurred

## 2023-01-20 NOTE — PROGRESS NOTES
Progress Note - Infectious Disease   Kenton Dc 65 y o  female MRN: [id number]  Unit/Bed#: ICU 12 Encounter: [id number]      Impression/Plan:  1   Sepsis, POA   Patient met sepsis criteria on presentation   Sources are 2 and 3   Patient unable to provide further history as she is intubated and course complicated by 4   Extensive resistance developing on prior cultures   2D echo technically difficult study   Extensive CT imaging without any focal findings otherwise  Antibiotics as below  Continue to trend fever curve/vitals  Repeat CBC and CMP tomorrow  Follow-up pending blood cultures  Additional imaging as below  Additional supportive care as per primary  Additional interventions pending clinical course     2   Polymicrobial bacteremia   Blood cultures have isolated Enterobacter and Enterococcus faecalis   Consider urinary source, no other ectopic focus of infection on extensive imaging   2D echo limited   Consider also endovascular source given 4   Other potential sources the patient's wound on her right AKA site   No other devices appreciated on  imaging   Patient unable to provide any extensive history  Continue meropenem, based on current culture so far  Continue vancomycin  Continue to trend fever curve/vitals  Repeat CBC and CMP tomorrow  Follow-up pending blood cultures  Additional blood culture sets ordered for tomorrow for clearance  Recommend formal transesophageal echo, when possible  Recommend surgical evaluation for wound on the right AKA site and possible images  Anticipate at minimum 2 weeks of IV antibiotic for this issue  Additional interventions pending clinical course     3   Complicated urinary tract infection   Patient unable to provide any history at this time  Nick Cortez was grossly abnormal on presentation   Possible source of the above  Urine cultures reviewed    Continue antibiotic as above  Monitor abdominal exam  Catheters as per primary     4   Cardiac arrest   Suspect that this may have been unrelated to the above   Patient with multiple other comorbidities as well   Ongoing care and work-up as per critical care/cardiology      5   Transaminitis   Acute elevation noted likely in the setting of 4   We will need to monitor LFTs however on meropenem  Improving  Antibiotic as above  Repeat LFTs tomorrow  Monitor abdominal exam     Above plan discussed in detail with critical care advanced practitioner      ID consult service will continue to follow      Antibiotics:  Meropenem/vancomycin 3  Total antibiotic 4    24 Hour events:  Yesterday and overnight notes reviewed and no acute events noted  Patient is not on pressors  Subjective: Intubated and sedated and does not interact on exam    Objective:  Vitals:  Temp:  [98 7 °F (37 1 °C)-99 6 °F (37 6 °C)] 99 2 °F (37 3 °C)  HR:  [72-86] 80  Resp:  [11-28] 15  BP: (156)/(50) 156/50  SpO2:  [100 %] 100 %  Temp (24hrs), Av 2 °F (37 3 °C), Min:98 7 °F (37 1 °C), Max:99 6 °F (37 6 °C)  Current: Temperature: 99 2 °F (37 3 °C)    Physical Exam:   General Appearance:   Intubated and sedated and does not interact on exam   Throat:  ET tube in place without acute issues   Lungs:    Vented breath sounds throughout no wheezes or rales   Heart:  RRR; no murmur, rub or gallop appreciated   Abdomen:   Soft, non-tender, non-distended, positive bowel sounds  Extremities: No clubbing, cyanosis; anasarca in all of her extremities   Skin: No new rashes or lesions  No new draining wounds noted  Labs, Imaging, & Other studies:   All pertinent labs and imaging studies were personally reviewed as below    Results from last 7 days   Lab Units 23  0544 238 23  1935   WBC Thousand/uL 13 89* 17 24* 14 53*   HEMOGLOBIN g/dL 5 4* 7 2* 7 7*   PLATELETS Thousands/uL 248 232 253     Results from last 7 days   Lab Units 23  0544 23  0448 23  1935   POTASSIUM mmol/L 2 9* 3 8 3 3*   CHLORIDE mmol/L 101 103 105   CO2 mmol/L 22 21 23   BUN mg/dL 24 23 22   CREATININE mg/dL 0 84 0 91 0 97   EGFR ml/min/1 73sq m 75 68 63   CALCIUM mg/dL 7 3* 7 4* 7 5*   AST U/L 39 47* 89*   ALT U/L 40 51 71*   ALK PHOS U/L 170* 121* 134*     Results from last 7 days   Lab Units 01/19/23  1226 01/17/23  1641 01/17/23  1606 01/17/23  1553   BLOOD CULTURE  Received in Microbiology Lab  Culture in Progress  Received in Microbiology Lab  Culture in Progress  --  Enterobacter cloacae*  Enterococcus faecalis* Gamma Hemolytic Streptococcus   GRAM STAIN RESULT   --   --  Gram negative rods*  Gram positive cocci in pairs and chains* Gram positive cocci in pairs and chains*   URINE CULTURE   --  >100,000 cfu/ml Enterococcus faecalis*  <10,000 cfu/ml Citrobacter freundii*  <10,000 cfu/ml Klebsiella variicola*  --   --        Lab interpretation/comments: White blood cell count downtrending  Hemoglobin continuing to drift  LFTs unremarkable  Imaging interpretation/comments: No new imaging overnight  LEON on hold till Monday  Culture data: Enterobacter on blood culture extensively drug-resistant and CRE testing pending

## 2023-01-20 NOTE — CONSULTS
Nutrition follow up    Initiate EN as medically feasible, initiate Vital HP at 20 ml/hr and advance 5 ml q8hr or as tolerated to goal rate 30 ml/hr (will [id number], 63g protein, 602ml free water, 50% RDI for micronutrients)   Free water flushes per medical team;   Monitor potassium and replete PRN;   Monitor Propofol infusion;    Recommend adding mvi given TF won't provide 100% RDI    Nutrition will continue to follow up as per policy

## 2023-01-20 NOTE — PROGRESS NOTES
Cardiology         Progress Note - Cardiology   Job Iman 65 y o  female MRN: [id number]  Unit/Bed#: ICU 12 Encounter: [id number]          Assessment/Recommendations/Discussion:     1  Cardiac arrest secondary to electrolyte abnormalities and severe sepsis  2  Severe anemia  3  Hypertension  4  Peripheral atrial disease  5  Heparin-induced thrombocytopenia  6  History of CVA  7  Diabetes  8  Polymicrobial bacteremia with Enterobacter and Enterococcus faecalis, possible urinary source  9  Fall with facial injury        · Patient hypertensive, continue ongoing antihypertensives, including beta-blocker  · Patient received 100 mg IV Lasix yesterday  Can likely attempt to balance I's and O's  Hypokalemic today, replete potassium  · Worsening anemia today with hemoglobin dropping from 7 2 is 5 4  Rule out bleeding source  Transfuse and repeat hemoglobin later today  · This morning's INR pending, with prior INR on 1/18/2023 at 4 2  · LEON requested by infectious disease  We will hold off on LEON until hemoglobin and electrolytes normalized and after INR level returns  We will tentatively plan for Monday    · Telemetry stable, continue IV amiodarone infusion  · Low normal left ventricular systolic function with ejection fraction 50 to 55% 1/18/2023  · Eventual elective coronary angiography              Subjective: Patient seen and examined, intubated/sedated                Physical Exam:    GEN: Intubated/sedated  HEENT: Facial ecchymosis seen around left eye, chin, right side of forehead  CV: Obese neck, regular rhythm, distant heart sounds, no murmurs, rubs, gallops heard, central obesity  RESP:  CTAB/L anteriorly  ABD:  SOFT central obesity      Vitals:   BP (!) 155/45   Pulse 82   Temp 98 6 °F (37 °C) (Bladder)   Resp 15   Ht 5' 4" (1 626 m)   Wt 72 1 kg (159 lb)   SpO2 100%   BMI 27 29 kg/m²   Vitals:    01/18/23 1100   Weight: 72 1 kg (159 lb)       Intake/Output Summary (Last 24 hours) at 1/20/2023  Logan sam filed at 2023 0925  Gross per 24 hour   Intake 1437 14 ml   Output 1550 ml   Net -112 86 ml       TELEMETRY: Sinus rhythm  Lab Results:  Results from last 7 days   Lab Units 23  0544   WBC Thousand/uL 13 89*   HEMOGLOBIN g/dL 5 4*   HEMATOCRIT % 17 5*   PLATELETS Thousands/uL 248     Results from last 7 days   Lab Units 23  0544   POTASSIUM mmol/L 2 9*   CHLORIDE mmol/L 101   CO2 mmol/L 22   BUN mg/dL 24   CREATININE mg/dL 0 84   CALCIUM mg/dL 7 3*   ALK PHOS U/L 170*   ALT U/L 40   AST U/L 39     Results from last 7 days   Lab Units 23  0544   POTASSIUM mmol/L 2 9*   CHLORIDE mmol/L 101   CO2 mmol/L 22   BUN mg/dL 24   CREATININE mg/dL 0 84   CALCIUM mg/dL 7 3*           Medications:    Current Facility-Administered Medications:   •  acetaminophen (TYLENOL) rectal suppository 650 mg, 650 mg, Rectal, Q6H PRN, Christine Kyra Dirico, DO, 650 mg at 23 2120  •  acetaminophen (TYLENOL) tablet 650 mg, 650 mg, Oral, Q6H PRN, Christine Kyra Dirico, DO, 650 mg at 23 0954  •  [] amiodarone (CORDARONE) 900 mg in dextrose 5 % 500 mL infusion, 1 mg/min, Intravenous, Continuous, Stopped at 23 1643 **FOLLOWED BY** amiodarone (CORDARONE) 900 mg in dextrose 5 % 500 mL infusion, 0 5 mg/min, Intravenous, Continuous, Eugenio Jose, CRNP, Last Rate: 16 7 mL/hr at 23 0925, 0 5 mg/min at 23 0925  •  amLODIPine (NORVASC) tablet 10 mg, 10 mg, Oral, Daily, Eugenio Jose, CRNP, 10 mg at 23 8557  •  buPROPion (WELLBUTRIN XL) 24 hr tablet 150 mg, 150 mg, Oral, Daily, Malik Dirico, DO, 150 mg at 23 0932  •  chlorhexidine (PERIDEX) 0 12 % oral rinse 15 mL, 15 mL, Mouth/Throat, Q12H [address], Leldon Nuria Barragan PA-C, 15 mL at 23 3121  •  clopidogrel (PLAVIX) tablet 75 mg, 75 mg, Oral, Daily, Wan Alcantara DO, 75 mg at 23 0910  •  clotrimazole-betamethasone (LOTRISONE) 1-0 05 % cream, , Topical, BID, Christine Alcantara DO, Given at 23 3092  • fentaNYL 1000 mcg in sodium chloride 0 9% 100mL infusion, 50 mcg/hr, Intravenous, Continuous, Wan Alcantara DO, Last Rate: 5 mL/hr at 01/20/23 0925, 50 mcg/hr at 01/20/23 0925  •  fentanyl citrate (PF) 100 MCG/2ML 50 mcg, 50 mcg, Intravenous, Q2H PRN, Willy Alcantara DO, 50 mcg at 01/20/23 0428  •  fluocinonide (LIDEX) 0 05 % ointment, , Topical, BID, Wan Alcantara DO, Given at 01/19/23 1753  •  insulin lispro (HumaLOG) 100 units/mL subcutaneous injection 1-5 Units, 1-5 Units, Subcutaneous, Q6H [address] **AND** Fingerstick Glucose (POCT), , , Q6H, Glenis Thompson PA-C  •  lisinopril (ZESTRIL) tablet 40 mg, 40 mg, Oral, Daily, ALEX Michaels, 40 mg at 01/20/23 0910  •  meropenem (MERREM) 1,000 mg in sodium chloride 0 9 % 100 mL IVPB, 1,000 mg, Intravenous, Q8H, Wan Alcantara DO, Last Rate: 100 mL/hr at 01/20/23 0121, 1,000 mg at 01/20/23 0121  •  metoprolol tartrate (LOPRESSOR) tablet 25 mg, 25 mg, Oral, Q12H [address], ALEX Michaels, 25 mg at 01/20/23 0911  •  NOREPINEPHRINE 4 MG  ML NSS (CMPD ORDER) infusion, 1-30 mcg/min, Intravenous, Titrated, Carolina Mac MD, Held at 01/18/23 1441  •  pantoprazole (PROTONIX) injection 40 mg, 40 mg, Intravenous, Q24H [address], ALEX Michaels, 40 mg at 01/20/23 3542  •  perflutren lipid microsphere (DEFINITY) injection, 0 4 mL/min, Intravenous, Once in imaging, ALEX Michaels  •  potassium chloride oral solution 40 mEq, 40 mEq, Oral, BID, Carolina Mac MD, 40 mEq at 01/20/23 1275  •  propofol (DIPRIVAN) 1000 mg in 100 mL infusion (premix), 5-50 mcg/kg/min, Intravenous, Titrated, Wan Alcantara DO, Last Rate: 21 6 mL/hr at 01/20/23 0925, 50 mcg/kg/min at 01/20/23 6721  •  sertraline (ZOLOFT) tablet 100 mg, 100 mg, Oral, Daily, Wan Alcantara DO, 100 mg at 01/20/23 9189    This note was completed in part utilizing [employer]    Grammatical errors, random word insertions, spelling mistakes, and incomplete sentences may be an occasional consequence of this system secondary to software limitations, ambient noise, and hardware issues  If you have any questions or concerns about the content, text, or information contained within the body of this dictation, please contact the provider for clarification

## 2023-01-20 NOTE — ASSESSMENT & PLAN NOTE
65year old female former smoker w/complicated medical hx including HTN, HLD, uncontrolled type II DM (A1c 9 8), JUSTIN, R hemispheric CVA, bilateral tissue loss s/p multiple vascular interventions, subsequently requiring b/l AKA  Patient now presents with sepsis, bacteremia and UTI with altered mental status/encephalopathy  Patient ultimately suffered Vfib arrest and remains intubated with plan for extubation  Vascular surgery consulted to r/o stump infection as source of bacteremia/sepsis  S/p L AKA 11/23/22 [facility])  S/p R AKA, wound debridement 12/1/22 [facility])  S/p R AKA wound/stump washout and VAC placement 12/16/22 [facility])  S/p R AKA wound/stump VAC change, L AKA stump washout/packing 12/19/22 ([facility])  Wound Cx 12/19/22: enterobacter cloacae     Blood Cx: +Enterobacter cloacae, +Enterococcus faecalis   Urine Cx: +Enterococcus faecalis, +Citrobacter freundii, +Klebsiella variicola     Hgb 6 4 (5 4)  WBC 13 89  SCr/eGFR 0 84/75  INR 2 97 (4 29)    Recommendations:  - S/p bilateral AKA w/subsequent bilateral revision/wash out  Prior L stump wound cultures grew enterobacter cloacae, which is now growing in blood culture  - On exam, bilateral stumps appear to be healing well  L stump without any opening, erythema or drainage  R stump wounds are healthy in appearance with good granulation tissue  No purulence noted  No fluctuance noted bilaterally   - Patient with polymicrobial urine and blood cultures  Suspect multiple sources  Would recommend imaging of bilateral stumps to r/o underlying abscess/infection given that blood cultures are positive for same bacteria that was growing out of left stump in December  - Patient remains stable, intubated with plan for extubation   - ID on board, appreciate recommendations   - Cardiology following as patient is now s/p arrest w/Vfib  Planning for LOEN   - Patient on coumadin and Plavix at baseline  INR noted to be supratherapeutic on admission   No clinical signs of active bleeding however patient remains anemic now s/p PRBC transfusion   Continue to trend Hgb  - Continue medical management and support per ICU team   - D/w Dr Laughlin Drafts & ICU team

## 2023-01-20 NOTE — CONSULTS
[address] [facility] 1960, 65 y o  female MRN: [id number]  Unit/Bed#: ICU 12 Encounter: [id number]  Primary Care Provider: ALEX Nance   Date and time admitted to hospital: 1/17/2023  3:48 PM    Inpatient consult to Vascular Surgery  Consult performed by: Leidy Aguillon PA-C  Consult ordered by: Elodia Frias DO        Bacteremia  Assessment & Plan  65year old female former smoker w/complicated medical hx including HTN, HLD, uncontrolled type II DM (A1c 9 8), JUSTIN, R hemispheric CVA, bilateral tissue loss s/p multiple vascular interventions, subsequently requiring b/l AKA  Patient now presents with sepsis, bacteremia and UTI with altered mental status/encephalopathy  Patient ultimately suffered Vfib arrest and remains intubated with plan for extubation  Vascular surgery consulted to r/o stump infection as source of bacteremia/sepsis  S/p L AKA 11/23/22 [facility])  S/p R AKA, wound debridement 12/1/22 [facility] [place] [facility])  S/p R AKA wound/stump washout and VAC placement 12/16/22 [facility])  S/p R AKA wound/stump VAC change, L AKA stump washout/packing 12/19/22 ([place])  Wound Cx 12/19/22: enterobacter cloacae     Blood Cx: +Enterobacter cloacae, +Enterococcus faecalis   Urine Cx: +Enterococcus faecalis, +Citrobacter freundii, +Klebsiella variicola     Hgb 6 4 (5 4)  WBC 13 89  SCr/eGFR 0 84/75  INR 2 97 (4 29)    Recommendations:  - S/p bilateral AKA w/subsequent bilateral revision/wash out  Prior L stump wound cultures grew enterobacter cloacae, which is now growing in blood culture  - On exam, bilateral stumps appear to be healing well  L stump without any opening, erythema or drainage  R stump wounds are healthy in appearance with good granulation tissue  No purulence noted  No fluctuance noted bilaterally   - Patient with polymicrobial urine and blood cultures  Suspect multiple sources   Would recommend imaging of bilateral stumps to r/o underlying abscess/infection given that blood cultures are positive for same bacteria that was growing out of left stump in December  - Patient remains stable, intubated with plan for extubation   - ID on board, appreciate recommendations   - Cardiology following as patient is now s/p arrest w/Vfib  Planning for LEON   - Patient on coumadin and Plavix at baseline  INR noted to be supratherapeutic on admission  No clinical signs of active bleeding however patient remains anemic now s/p PRBC transfusion  Continue to trend Hgb  - Continue medical management and support per ICU team   - D/w Dr Sarita Rolon & ICU team    HPI: Carolina Asencio is a 65 y o  female former smoker w/complicated medical hx including HTN, HLD, uncontrolled type II DM (A1c 9 8), JUSTIN, R hemispheric CVA, bilateral tissue loss s/p multiple vascular interventions, subsequently requiring b/l AKA  Patient now presents with sepsis, bacteremia and UTI with altered mental status/encephalopathy  Patient ultimately suffered Vfib arrest and remains intubated with plan for extubation  Vascular surgery consulted to r/o stump infection as source of bacteremia/sepsis  Patient remains intubated and sedated  Hx obtained from patient's chart and critical care team      Review of Systems:  Unable to perform, patient is intubated and sedated     Past Medical History:  Past Medical History:   Diagnosis Date   • Anxiety    • Depression    • Diabetes ([facility] )    • Diabetes mellitus ([facility] )    • Esophageal reflux     28 APR 2015 RESOLVED   • Hyperlipidemia    • Hypertension    • Low back pain     sciatica   • Pneumonia 2019   • Stroke ([facility] [address] ) 12/2015    small vessel, L frontal corona radiata   Rx asa 81, Lipitor 40, risk modification   • Vitamin D deficiency        Past Surgical History:  Past Surgical History:   Procedure Laterality Date   • AMPUTATION ABOVE KNEE (AKA) Right 12/1/2022    Procedure: (AKA), PSB  BKA;  Surgeon: Priscila Ward DO;  Location: [facility];  Service: Vascular   • ARTERIOGRAM Right 2022    Procedure: -Right lower extremity angiogram -Right CFA balloon angioplasty with [id number]  [employer]  -Right CFA DCB with 6x40 Bard Lutonix -Right CFA atherectomy with Jetstream and distal embolization protection with 7mm Spider FX -Right SFA/Pop angioplasty with 4x40 Chololate balloon -Right SFA/Pop DCB with 5x150 Bard Lutonix -Right SFA stent with 6x80 W W  Memamp x2 -R   • COLONOSCOPY     • IR AORTAGRAM WITH RUN-OFF  10/31/2022   • IR LOWER EXTREMITY ANGIOGRAM  11/10/2022   • IR LOWER EXTREMITY ANGIOGRAM  2022   • CT AMPUTATION THIGH THROUGH FEMUR ANY LEVEL Left 2022    Procedure: (AKA); Surgeon: Harsha Treviño DO;  [facility];  Service: Vascular   • CT NEGATIVE PRESSURE WOUND THERAPY DME </= 50 SQ CM Bilateral 2022    Procedure: Right above knee amputation washout; vac change; Left above knee amputation debridement; vac placement;  Surgeon: Seema Jhaveri DO;  [facility];  Service: Vascular   • WOUND DEBRIDEMENT Right 2022    Procedure: AKA STUMP WASHOUT, Left AKA Staple removal  application of wound vac to Right AKA; Surgeon: Chino Vogt MD;  [facility];  Service: Vascular       Social History:  Social History     Substance and Sexual Activity   Alcohol Use Never    Comment: OCCASIONAL ALCOHOL USE IN ALL SCRIPTS     Social History     Substance and Sexual Activity   Drug Use No     Social History     Tobacco Use   Smoking Status Former   • Types: Cigarettes   • Quit date:    • Years since quittin 0   Smokeless Tobacco Never       Family History:  Family History   Problem Relation Age of Onset   • Diabetes Mother    • Lung cancer Mother    • Cancer Father    • Lung cancer Father    • Hypertension Brother    • Hypertension Family        Allergies:   Allergies   Allergen Reactions   • Heparin Other (See Comments)     History of HIT       Medications:  Current Facility-Administered Medications   Medication Dose Route Frequency   • acetaminophen (TYLENOL) rectal suppository 650 mg  650 mg Rectal Q6H PRN   • acetaminophen (TYLENOL) tablet 650 mg  650 mg Oral Q6H PRN   • amiodarone (CORDARONE) 900 mg in dextrose 5 % 500 mL infusion  0 5 mg/min Intravenous Continuous   • amLODIPine (NORVASC) tablet 10 mg  10 mg Oral Daily   • buPROPion (WELLBUTRIN XL) 24 hr tablet 150 mg  150 mg Oral Daily   • chlorhexidine (PERIDEX) 0 12 % oral rinse 15 mL  15 mL Mouth/Throat Q12H [facility]   • clopidogrel (PLAVIX) tablet 75 mg  75 mg Oral Daily   • clotrimazole-betamethasone (LOTRISONE) 1-0 05 % cream   Topical BID   • fentaNYL 1000 mcg in sodium chloride 0 9% 100mL infusion  50 mcg/hr Intravenous Continuous   • fentanyl citrate (PF) 100 MCG/2ML 50 mcg  50 mcg Intravenous Q2H PRN   • fluocinonide (LIDEX) 0 05 % ointment   Topical BID   • insulin lispro (HumaLOG) 100 units/mL subcutaneous injection 1-5 Units  1-5 Units Subcutaneous Q6H [facility]   • lisinopril (ZESTRIL) tablet 40 mg  40 mg Oral Daily   • meropenem (MERREM) 1,000 mg in sodium chloride 0 9 % 100 mL IVPB  1,000 mg Intravenous Q8H   • metoprolol tartrate (LOPRESSOR) tablet 25 mg  25 mg Oral Q12H [facility]   • pantoprazole (PROTONIX) injection 40 mg  40 mg Intravenous Q24H [facility]   • perflutren lipid microsphere (DEFINITY) injection  0 4 mL/min Intravenous Once in imaging   • potassium chloride oral solution 40 mEq  40 mEq Oral BID   • propofol (DIPRIVAN) 1000 mg in 100 mL infusion (premix)  5-50 mcg/kg/min Intravenous Titrated   • sertraline (ZOLOFT) tablet 100 mg  100 mg Oral Daily   • vancomycin (VANCOCIN) IVPB (premix in dextrose) 1,000 mg 200 mL  1,000 mg Intravenous Q24H       Vitals:  /52 (01/20/23 1642)    Temp 99 5 °F (37 5 °C) (01/20/23 1642)    Pulse 90 (01/20/23 1646)   Resp (!) 11 (01/20/23 1646)    SpO2 100 % (01/20/23 1646)      I/Os:  I/O last 3 completed shifts:   In: 4282 9 [I V :3832 9; IV Piggyback:450]  Out: [employer] [ELVWI:9159; Emesis/NG output:300]  I/O this shift:  In: 5687 [I V :541 7; Blood:383 3; NG/GT:150; IV Piggyback:100]  Out: 200 [Urine:200]    Lab Results and Cultures:   CBC with diff:   Lab Results   Component Value Date    WBC 13 89 (H) 01/20/2023    HGB 6 4 (LL) 01/20/2023    HCT 19 1 (L) 01/20/2023    MCV 85 01/20/2023     01/20/2023    MCH 26 3 (L) 01/20/2023    MCHC 30 9 (L) 01/20/2023    RDW 17 4 (H) 01/20/2023    MPV 9 4 01/20/2023    NRBC 0 01/20/2023   ,   BMP/CMP:  Lab Results   Component Value Date    K 3 1 (L) 01/20/2023     01/20/2023    CO2 23 01/20/2023    BUN 24 01/20/2023    CREATININE 0 84 01/20/2023    CALCIUM 7 3 (L) 01/20/2023    AST 39 01/20/2023    ALT 40 01/20/2023    ALKPHOS 170 (H) 01/20/2023    EGFR 75 01/20/2023   ,   Lipid Panel: No results found for: CHOL,   Coags:   Lab Results   Component Value Date    PTT 57 (H) 01/17/2023    INR 2 97 (H) 01/20/2023   ,     Blood Culture:   Lab Results   Component Value Date    BLOODCX Received in Microbiology Lab  Culture in Progress  01/19/2023    BLOODCX Received in Microbiology Lab  Culture in Progress  01/19/2023   ,   Urinalysis:   Lab Results   Component Value Date    COLORU Yellow 01/17/2023    CLARITYU Cloudy 01/17/2023    SPECGRAV 1 025 01/17/2023    PHUR 6 0 01/17/2023    PHUR 6 5 02/23/2018    LEUKOCYTESUR Small (A) 01/17/2023    NITRITE Negative 01/17/2023    GLUCOSEU Negative 01/17/2023    KETONESU Negative 01/17/2023    BILIRUBINUR Negative 01/17/2023    BLOODU Large (A) 01/17/2023   ,   Urine Culture:   Lab Results   Component Value Date    URINECX >100,000 cfu/ml Enterococcus faecalis (A) 01/17/2023    URINECX <10,000 cfu/ml Citrobacter freundii (A) 01/17/2023    URINECX <10,000 cfu/ml Klebsiella variicola (A) 01/17/2023   ,   Wound Culure: No results found for: WOUNDCULT    Imaging:  Reviewed    Physical Exam:    General appearance: Intubated and sedated  Head: Forehead laceration s/p repair   Bruising throughout the face  Eyes: Grossly normal   Throat: Intubated, moist mucous membranes   Neck: Supple, no JVD  Back: Negative   Lungs: Intubated, unremarkable   Chest wall: Grossly normal   Heart: Regular rate   Abdomen: Soft, non-distended   Extremities: S/p bilateral AKA  R AKA with superficial wounds with healthy granulation tissue  No evidence of purulence or superficial infection   No fluctuance   Skin: Grossly normal other than documented above   Neurologic: Sedated    Cassidy Daniel PA-C  1/20/2023

## 2023-01-20 NOTE — PLAN OF CARE
Problem: MOBILITY - ADULT  Goal: Maintain or return to baseline ADL function  Description: INTERVENTIONS:  -  Assess patient's ability to carry out ADLs; assess patient's baseline for ADL function and identify physical deficits which impact ability to perform ADLs (bathing, care of mouth/teeth, toileting, grooming, dressing, etc )  - Assess/evaluate cause of self-care deficits   - Assess range of motion  - Assess patient's mobility; develop plan if impaired  - Assess patient's need for assistive devices and provide as appropriate  - Encourage maximum independence but intervene and supervise when necessary  - Involve family in performance of ADLs  - Assess for home care needs following discharge   - Consider OT consult to assist with ADL evaluation and planning for discharge  - Provide patient education as appropriate  Outcome: Progressing  Goal: Maintains/Returns to pre admission functional level  Description: INTERVENTIONS:  - Perform BMAT or MOVE assessment daily    - Set and communicate daily mobility goal to care team and patient/family/caregiver  - Collaborate with rehabilitation services on mobility goals if consulted  - Perform Range of Motion  times a day  - Reposition patient every  hours    - Dangle patient  times a day  - Stand patient  times a day  - Ambulate patient  times a day  - Out of bed to chair  times a day   - Out of bed for meals  times a day  - Out of bed for toileting  - Record patient progress and toleration of activity level   Outcome: Progressing     Problem: Prexisting or High Potential for Compromised Skin Integrity  Goal: Skin integrity is maintained or improved  Description: INTERVENTIONS:  - Identify patients at risk for skin breakdown  - Assess and monitor skin integrity  - Assess and monitor nutrition and hydration status  - Monitor labs   - Assess for incontinence   - Turn and reposition patient  - Assist with mobility/ambulation  - Relieve pressure over bony prominences  - Avoid friction and shearing  - Provide appropriate hygiene as needed including keeping skin clean and dry  - Evaluate need for skin moisturizer/barrier cream  - Collaborate with interdisciplinary team   - Patient/family teaching  - Consider wound care consult   Outcome: Progressing     Problem: PAIN - ADULT  Goal: Verbalizes/displays adequate comfort level or baseline comfort level  Description: Interventions:  - Encourage patient to monitor pain and request assistance  - Assess pain using appropriate pain scale  - Administer analgesics based on type and severity of pain and evaluate response  - Implement non-pharmacological measures as appropriate and evaluate response  - Consider cultural and social influences on pain and pain management  - Notify physician/advanced practitioner if interventions unsuccessful or patient reports new pain  Outcome: Progressing     Problem: INFECTION - ADULT  Goal: Absence or prevention of progression during hospitalization  Description: INTERVENTIONS:  - Assess and monitor for signs and symptoms of infection  - Monitor lab/diagnostic results  - Monitor all insertion sites, i e  indwelling lines, tubes, and drains  - Monitor endotracheal if appropriate and nasal secretions for changes in amount and color  - [place] appropriate cooling/warming therapies per order  - Administer medications as ordered  - Instruct and encourage patient and family to use good hand hygiene technique  - Identify and instruct in appropriate isolation precautions for identified infection/condition  Outcome: Progressing  Goal: Absence of fever/infection during neutropenic period  Description: INTERVENTIONS:  - Monitor WBC    Outcome: Progressing     Problem: SAFETY ADULT  Goal: Maintain or return to baseline ADL function  Description: INTERVENTIONS:  -  Assess patient's ability to carry out ADLs; assess patient's baseline for ADL function and identify physical deficits which impact ability to perform ADLs (bathing, care of mouth/teeth, toileting, grooming, dressing, etc )  - Assess/evaluate cause of self-care deficits   - Assess range of motion  - Assess patient's mobility; develop plan if impaired  - Assess patient's need for assistive devices and provide as appropriate  - Encourage maximum independence but intervene and supervise when necessary  - Involve family in performance of ADLs  - Assess for home care needs following discharge   - Consider OT consult to assist with ADL evaluation and planning for discharge  - Provide patient education as appropriate  Outcome: Progressing  Goal: Maintains/Returns to pre admission functional level  Description: INTERVENTIONS:  - Perform BMAT or MOVE assessment daily    - Set and communicate daily mobility goal to care team and patient/family/caregiver  - Collaborate with rehabilitation services on mobility goals if consulted  - Perform Range of Motion  times a day  - Reposition patient every  hours    - Dangle patient  times a day  - Stand patient  times a day  - Ambulate patient  times a day  - Out of bed to chair  times a day   - Out of bed for meals  times a day  - Out of bed for toileting  - Record patient progress and toleration of activity level   Outcome: Progressing  Goal: Patient will remain free of falls  Description: INTERVENTIONS:  - Educate patient/family on patient safety including physical limitations  - Instruct patient to call for assistance with activity   - Consult OT/PT to assist with strengthening/mobility   - Keep Call bell within reach  - Keep bed low and locked with side rails adjusted as appropriate  - Keep care items and personal belongings within reach  - Initiate and maintain comfort rounds  - Make Fall Risk Sign visible to staff  - Offer Toileting every  Hours, in advance of need  - Initiate/Maintain alarm  - Obtain necessary fall risk management equipment:   - Apply yellow socks and bracelet for high fall risk patients  - Consider moving patient to room near nurses station  Outcome: Progressing     Problem: DISCHARGE PLANNING  Goal: Discharge to home or other facility with appropriate resources  Description: INTERVENTIONS:  - Identify barriers to discharge w/patient and caregiver  - Arrange for needed discharge resources and transportation as appropriate  - Identify discharge learning needs (meds, wound care, etc )  - Arrange for interpretive services to assist at discharge as needed  - Refer to Case Management Department for coordinating discharge planning if the patient needs post-hospital services based on physician/advanced practitioner order or complex needs related to functional status, cognitive ability, or social support system  Outcome: Progressing     Problem: Knowledge Deficit  Goal: Patient/family/caregiver demonstrates understanding of disease process, treatment plan, medications, and discharge instructions  Description: Complete learning assessment and assess knowledge base  Interventions:  - Provide teaching at level of understanding  - Provide teaching via preferred learning methods  Outcome: Progressing     Problem: Nutrition/Hydration-ADULT  Goal: Nutrient/Hydration intake appropriate for improving, restoring or maintaining nutritional needs  Description: Monitor and assess patient's nutrition/hydration status for malnutrition  Collaborate with interdisciplinary team and initiate plan and interventions as ordered  Monitor patient's weight and dietary intake as ordered or per policy  Utilize nutrition screening tool and intervene as necessary  Determine patient's food preferences and provide high-protein, high-caloric foods as appropriate       INTERVENTIONS:  - Monitor oral intake, urinary output, labs, and treatment plans  - Assess nutrition and hydration status and recommend course of action  - Evaluate amount of meals eaten  - Assist patient with eating if necessary   - Allow adequate time for meals  - Recommend/ encourage appropriate diets, oral nutritional supplements, and vitamin/mineral supplements  - Order, calculate, and assess calorie counts as needed  - Recommend, monitor, and adjust tube feedings and TPN/PPN based on assessed needs  - Assess need for intravenous fluids  - Provide specific nutrition/hydration education as appropriate  - Include patient/family/caregiver in decisions related to nutrition  Outcome: Progressing     Problem: SAFETY,RESTRAINT: NV/NON-SELF DESTRUCTIVE BEHAVIOR  Goal: Remains free of harm/injury (restraint for non violent/non self-detsructive behavior)  Description: INTERVENTIONS:  - Instruct patient/family regarding restraint use   - Assess and monitor physiologic and psychological status   - Provide interventions and comfort measures to meet assessed patient needs   - Identify and implement measures to help patient regain control  - Assess readiness for release of restraint   Outcome: Progressing  Goal: Returns to optimal restraint-free functioning  Description: INTERVENTIONS:  - Assess the patient's behavior and symptoms that indicate continued need for restraint  - Identify and implement measures to help patient regain control  - Assess readiness for release of restraint   Outcome: Progressing     Problem: COPING  Goal: Pt/Family able to verbalize concerns and demonstrate effective coping strategies  Description: INTERVENTIONS:  - Assist patient/family to identify coping skills, available support systems and cultural and spiritual values  - Provide emotional support, including active listening and acknowledgement of concerns of patient and caregivers  - Reduce environmental stimuli, as able  - Provide patient education  - Assess for spiritual pain/suffering and initiate spiritual care, including notification of Pastoral Care or chidi based community as needed  - Assess effectiveness of coping strategies  Outcome: Progressing  Goal: Will report anxiety at manageable levels  Description: INTERVENTIONS:  - Administer medication as ordered  - Teach and encourage coping skills  - Provide emotional support  - Assess patient/family for anxiety and ability to cope  Outcome: Progressing

## 2023-01-20 NOTE — ASSESSMENT & PLAN NOTE
· The patient was admitted with sepsis on 1/17  On the morning of 1/18 was found unresponsive and without a pulse   ROSC was obtained following 1mg epinephrine and defibrillation with 200 joules  · We will change her antibiotics to meropenum based on her sensitivities  · She is noted to have polymicrobial bacteremia with enterococcus and enterobacter   · monitor her fever curve, procal and WBC level  · Her arrest may be related to ischemia in the setting of infection but given her significant vascular issues and coronary calcifications seen on CT a w/u is needed   · A new infection is felt less likely to be a contributing factor  · LEON scheduled for monday

## 2023-01-20 NOTE — QUICK NOTE
Patient's daughter Rosa Maria Garrison has given verbal consent for blood transfusion, the the patient is sedated and intubated

## 2023-01-20 NOTE — PROGRESS NOTES
Mian Sequeira is a 65 y o  female who is currently ordered Vancomycin IV with management by the Pharmacy Consult service  Relevant clinical data and objective / subjective history reviewed  Vancomycin Assessment:  Indication and Goal AUC/Trough: Other, sepsis, -500, trough 15-20           2  Renal Function:  SCr: 0 84 mg/dL  CrCl: 68 5 mL/min  Renal replacement: Not on dialysis  Days of Therapy: 4  Current Dose: 1750mg IV as a loading dose  Vancomycin Plan:  New Dosinmg IV every 24 hours  Estimated AUC: 434 mcg*hr/mL  Estimated Trough: 13 3 mcg/mL  Next Level: random level 23 at 0600  Renal Function Monitoring: Daily BMP and [place] will continue to follow closely for s/sx of nephrotoxicity, infusion reactions and appropriateness of therapy  BMP and CBC will be ordered per protocol  We will continue to follow the patient’s culture results and clinical progress daily      Fabiola Smith, Pharmacist

## 2023-01-21 LAB
ABO GROUP BLD BPU: NORMAL
ABO GROUP BLD BPU: NORMAL
ALBUMIN SERPL BCP-MCNC: 2.5 G/DL (ref 3.5–5)
ALP SERPL-CCNC: 205 U/L (ref 34–104)
ALT SERPL W P-5'-P-CCNC: 28 U/L (ref 7–52)
ANION GAP SERPL CALCULATED.3IONS-SCNC: 11 MMOL/L (ref 4–13)
AST SERPL W P-5'-P-CCNC: 29 U/L (ref 13–39)
BASOPHILS # BLD AUTO: 0.06 THOUSANDS/ÂΜL (ref 0–0.1)
BASOPHILS NFR BLD AUTO: 0 % (ref 0–1)
BILIRUB SERPL-MCNC: 0.51 MG/DL (ref 0.2–1)
BPU ID: NORMAL
BPU ID: NORMAL
BUN SERPL-MCNC: 22 MG/DL (ref 5–25)
CALCIUM ALBUM COR SERPL-MCNC: 8.6 MG/DL (ref 8.3–10.1)
CALCIUM SERPL-MCNC: 7.4 MG/DL (ref 8.4–10.2)
CHLORIDE SERPL-SCNC: 105 MMOL/L (ref 96–108)
CO2 SERPL-SCNC: 20 MMOL/L (ref 21–32)
CREAT SERPL-MCNC: 0.79 MG/DL (ref 0.6–1.3)
CROSSMATCH: NORMAL
CROSSMATCH: NORMAL
EOSINOPHIL # BLD AUTO: 1.22 THOUSAND/ÂΜL (ref 0–0.61)
EOSINOPHIL NFR BLD AUTO: 8 % (ref 0–6)
ERYTHROCYTE [DISTWIDTH] IN BLOOD BY AUTOMATED COUNT: 15.4 % (ref 11.6–15.1)
GFR SERPL CREATININE-BSD FRML MDRD: 81 ML/MIN/1.73SQ M
GLUCOSE SERPL-MCNC: 119 MG/DL (ref 65–140)
GLUCOSE SERPL-MCNC: 120 MG/DL (ref 65–140)
GLUCOSE SERPL-MCNC: 121 MG/DL (ref 65–140)
GLUCOSE SERPL-MCNC: 130 MG/DL (ref 65–140)
GLUCOSE SERPL-MCNC: 134 MG/DL (ref 65–140)
GLUCOSE SERPL-MCNC: 140 MG/DL (ref 65–140)
GLUCOSE SERPL-MCNC: 80 MG/DL (ref 65–140)
GLUCOSE SERPL-MCNC: 84 MG/DL (ref 65–140)
HCT VFR BLD AUTO: 21.3 % (ref 34.8–46.1)
HGB BLD-MCNC: 7.2 G/DL (ref 11.5–15.4)
IMM GRANULOCYTES # BLD AUTO: >0.5 THOUSAND/UL (ref 0–0.2)
IMM GRANULOCYTES NFR BLD AUTO: 4 % (ref 0–2)
LYMPHOCYTES # BLD AUTO: 1.88 THOUSANDS/ÂΜL (ref 0.6–4.47)
LYMPHOCYTES NFR BLD AUTO: 12 % (ref 14–44)
MAGNESIUM SERPL-MCNC: 2.1 MG/DL (ref 1.9–2.7)
MCH RBC QN AUTO: 28.7 PG (ref 26.8–34.3)
MCHC RBC AUTO-ENTMCNC: 33.8 G/DL (ref 31.4–37.4)
MCV RBC AUTO: 85 FL (ref 82–98)
MONOCYTES # BLD AUTO: 1.14 THOUSAND/ÂΜL (ref 0.17–1.22)
MONOCYTES NFR BLD AUTO: 7 % (ref 4–12)
NEUTROPHILS # BLD AUTO: 10.7 THOUSANDS/ÂΜL (ref 1.85–7.62)
NEUTS SEG NFR BLD AUTO: 69 % (ref 43–75)
NRBC BLD AUTO-RTO: 0 /100 WBCS
PLATELET # BLD AUTO: 239 THOUSANDS/UL (ref 149–390)
PMV BLD AUTO: 8.9 FL (ref 8.9–12.7)
POTASSIUM SERPL-SCNC: 3.3 MMOL/L (ref 3.5–5.3)
PROT SERPL-MCNC: 4.9 G/DL (ref 6.4–8.4)
RBC # BLD AUTO: 2.51 MILLION/UL (ref 3.81–5.12)
SODIUM SERPL-SCNC: 136 MMOL/L (ref 135–147)
TRIGL SERPL-MCNC: 677 MG/DL
UNIT DISPENSE STATUS: NORMAL
UNIT DISPENSE STATUS: NORMAL
UNIT PRODUCT CODE: NORMAL
UNIT PRODUCT CODE: NORMAL
UNIT PRODUCT VOLUME: 350 ML
UNIT PRODUCT VOLUME: 350 ML
UNIT RH: NORMAL
UNIT RH: NORMAL
VANCOMYCIN SERPL-MCNC: 26.1 UG/ML (ref 10–20)
WBC # BLD AUTO: 15.66 THOUSAND/UL (ref 4.31–10.16)

## 2023-01-21 RX ORDER — FENTANYL CITRATE 50 UG/ML
50 INJECTION, SOLUTION INTRAMUSCULAR; INTRAVENOUS EVERY 2 HOUR PRN
Status: DISCONTINUED | OUTPATIENT
Start: 2023-01-21 | End: 2023-01-21

## 2023-01-21 RX ORDER — ALBUMIN, HUMAN INJ 5% 5 %
12.5 SOLUTION INTRAVENOUS ONCE
Status: COMPLETED | OUTPATIENT
Start: 2023-01-21 | End: 2023-01-21

## 2023-01-21 RX ORDER — METOPROLOL TARTRATE 5 MG/5ML
5 INJECTION INTRAVENOUS EVERY 6 HOURS
Status: DISCONTINUED | OUTPATIENT
Start: 2023-01-21 | End: 2023-01-21

## 2023-01-21 RX ORDER — HYDRALAZINE HYDROCHLORIDE 20 MG/ML
5 INJECTION INTRAMUSCULAR; INTRAVENOUS ONCE
Status: COMPLETED | OUTPATIENT
Start: 2023-01-21 | End: 2023-01-21

## 2023-01-21 RX ORDER — DEXMEDETOMIDINE HYDROCHLORIDE 4 UG/ML
.1-.7 INJECTION, SOLUTION INTRAVENOUS
Status: DISCONTINUED | OUTPATIENT
Start: 2023-01-21 | End: 2023-01-21

## 2023-01-21 RX ORDER — POTASSIUM CHLORIDE 20MEQ/15ML
40 LIQUID (ML) ORAL 3 TIMES DAILY
Status: DISPENSED | OUTPATIENT
Start: 2023-01-21 | End: 2023-01-22

## 2023-01-21 RX ORDER — SODIUM CHLORIDE, SODIUM GLUCONATE, SODIUM ACETATE, POTASSIUM CHLORIDE, MAGNESIUM CHLORIDE, SODIUM PHOSPHATE, DIBASIC, AND POTASSIUM PHOSPHATE .53; .5; .37; .037; .03; .012; .00082 G/100ML; G/100ML; G/100ML; G/100ML; G/100ML; G/100ML; G/100ML
500 INJECTION, SOLUTION INTRAVENOUS ONCE
Status: COMPLETED | OUTPATIENT
Start: 2023-01-21 | End: 2023-01-21

## 2023-01-21 RX ORDER — ACETAMINOPHEN 650 MG/1
650 SUPPOSITORY RECTAL EVERY 6 HOURS PRN
Status: DISCONTINUED | OUTPATIENT
Start: 2023-01-21 | End: 2023-01-23 | Stop reason: SDUPTHER

## 2023-01-21 RX ORDER — FENTANYL CITRATE 50 UG/ML
50 INJECTION, SOLUTION INTRAMUSCULAR; INTRAVENOUS EVERY 2 HOUR PRN
Status: DISCONTINUED | OUTPATIENT
Start: 2023-01-21 | End: 2023-01-23

## 2023-01-21 RX ADMIN — AMLODIPINE BESYLATE 10 MG: 10 TABLET ORAL at 08:39

## 2023-01-21 RX ADMIN — CHLORHEXIDINE GLUCONATE 0.12% ORAL RINSE 15 ML: 1.2 LIQUID ORAL at 08:39

## 2023-01-21 RX ADMIN — POLYETHYLENE GLYCOL 3350 17 G: 17 POWDER, FOR SOLUTION ORAL at 08:39

## 2023-01-21 RX ADMIN — SODIUM CHLORIDE, SODIUM GLUCONATE, SODIUM ACETATE, POTASSIUM CHLORIDE, MAGNESIUM CHLORIDE, SODIUM PHOSPHATE, DIBASIC, AND POTASSIUM PHOSPHATE 500 ML: .53; .5; .37; .037; .03; .012; .00082 INJECTION, SOLUTION INTRAVENOUS at 05:23

## 2023-01-21 RX ADMIN — PANTOPRAZOLE SODIUM 40 MG: 40 INJECTION, POWDER, FOR SOLUTION INTRAVENOUS at 08:39

## 2023-01-21 RX ADMIN — FENTANYL CITRATE 50 MCG: 50 INJECTION INTRAMUSCULAR; INTRAVENOUS at 20:50

## 2023-01-21 RX ADMIN — ALBUMIN (HUMAN) 12.5 G: 12.5 INJECTION, SOLUTION INTRAVENOUS at 02:29

## 2023-01-21 RX ADMIN — PROPOFOL 30 MCG/KG/MIN: 10 INJECTION, EMULSION INTRAVENOUS at 05:26

## 2023-01-21 RX ADMIN — SERTRALINE HYDROCHLORIDE 100 MG: 100 TABLET, FILM COATED ORAL at 08:39

## 2023-01-21 RX ADMIN — Medication 75 MCG/HR: at 02:29

## 2023-01-21 RX ADMIN — CLOPIDOGREL BISULFATE 75 MG: 75 TABLET ORAL at 08:39

## 2023-01-21 RX ADMIN — METOPROLOL TARTRATE 25 MG: 25 TABLET, FILM COATED ORAL at 08:39

## 2023-01-21 RX ADMIN — FENTANYL CITRATE 50 MCG: 50 INJECTION INTRAMUSCULAR; INTRAVENOUS at 23:03

## 2023-01-21 RX ADMIN — BUPROPION HYDROCHLORIDE 150 MG: 150 TABLET, FILM COATED, EXTENDED RELEASE ORAL at 08:39

## 2023-01-21 RX ADMIN — DEXMEDETOMIDINE HYDROCHLORIDE 0.2 MCG/KG/HR: 400 INJECTION INTRAVENOUS at 09:05

## 2023-01-21 RX ADMIN — LISINOPRIL 40 MG: 20 TABLET ORAL at 08:39

## 2023-01-21 RX ADMIN — POTASSIUM CHLORIDE 40 MEQ: 20 SOLUTION ORAL at 08:39

## 2023-01-21 RX ADMIN — FENTANYL CITRATE 50 MCG: 50 INJECTION INTRAMUSCULAR; INTRAVENOUS at 16:08

## 2023-01-21 RX ADMIN — PROPOFOL 40 MCG/KG/MIN: 10 INJECTION, EMULSION INTRAVENOUS at 00:50

## 2023-01-21 RX ADMIN — VANCOMYCIN HYDROCHLORIDE 1000 MG: 1 INJECTION, SOLUTION INTRAVENOUS at 20:03

## 2023-01-21 RX ADMIN — METOROPROLOL TARTRATE 5 MG: 5 INJECTION, SOLUTION INTRAVENOUS at 15:41

## 2023-01-21 RX ADMIN — MEROPENEM 1000 MG: 1 INJECTION, POWDER, FOR SOLUTION INTRAVENOUS at 01:55

## 2023-01-21 RX ADMIN — MEROPENEM 1000 MG: 1 INJECTION, POWDER, FOR SOLUTION INTRAVENOUS at 08:48

## 2023-01-21 RX ADMIN — ACETAMINOPHEN 650 MG: 650 SUPPOSITORY RECTAL at 19:00

## 2023-01-21 RX ADMIN — HYDRALAZINE HYDROCHLORIDE 5 MG: 20 INJECTION, SOLUTION INTRAMUSCULAR; INTRAVENOUS at 22:45

## 2023-01-21 RX ADMIN — ACETAMINOPHEN 650 MG: 325 TABLET ORAL at 02:36

## 2023-01-21 RX ADMIN — MEROPENEM 1000 MG: 1 INJECTION, POWDER, FOR SOLUTION INTRAVENOUS at 18:36

## 2023-01-21 RX ADMIN — FENTANYL CITRATE 50 MCG: 50 INJECTION INTRAMUSCULAR; INTRAVENOUS at 18:35

## 2023-01-21 NOTE — PROGRESS NOTES
Cardiology         Progress Note - Cardiology   Amber Jones 65 y o  female MRN: [id number]  Unit/Bed#: ICU 12 Encounter: [id number]          Assessment/Recommendations/Discussion:   1  Cardiac arrest secondary to electrolyte abnormalities and severe sepsis  2  Severe anemia  3  Hypertension  4  Peripheral atrial disease  5  Heparin-induced thrombocytopenia  6  History of CVA  7  Diabetes  8  Polymicrobial bacteremia with Enterobacter and Enterococcus faecalis, possible urinary source  9  Fall with facial injury      · Hemoglobin improved after transfusion yesterday, although decreased overnight  Continue to monitor hemoglobin closely  · Attempted balance I's and O's  · LEON has been requested by infectious disease    We will tentatively plan for Monday as long as hemoglobin, INR and electrolytes are stable  · Low normal left ventricle solid function with ejection fraction 50 to 55% on 1/18/2023  · Eventual coronary angiography  · Telemetry remained stable, continue IV amiodarone          Subjective: Patient seen and examined, intubated/sedated                Physical Exam:  GEN:  NAD, intubated, sedated  HEENT:  MMM, NCAT, pink conjunctiva, EOMI, nonicteric sclera  CV:   obese neck, regular rhythm , NO M/R/G, +S1/S2, NO PARASTERNAL HEAVE/THRILL, NO LE EDEMA, NO HEPATIC SYSTOLIC PULSATION, WARM EXTREMITIES  RESP:  CTAB/L anteriorly  ABD:  SOFT        Vitals:   /54   Pulse 76   Temp 99 °F (37 2 °C)   Resp 16   Ht 5' 4" (1 626 m)   Wt 72 1 kg (159 lb)   SpO2 99%   BMI 27 29 kg/m²   Vitals:    01/18/23 1100   Weight: 72 1 kg (159 lb)       Intake/Output Summary (Last 24 hours) at 1/21/2023 0720  Last data filed at 1/21/2023 0600  Gross per 24 hour   Intake 2665 32 ml   Output 570 ml   Net 2095 32 ml       TELEMETRY: Sinus rhythm  Lab Results:  Results from last 7 days   Lab Units 01/21/23  0539   WBC Thousand/uL 15 66*   HEMOGLOBIN g/dL 7 2*   HEMATOCRIT % 21 3*   PLATELETS Thousands/uL 239 Results from last 7 days   Lab Units 23  0539   POTASSIUM mmol/L 3 3*   CHLORIDE mmol/L 105   CO2 mmol/L 20*   BUN mg/dL 22   CREATININE mg/dL 0 79   CALCIUM mg/dL 7 4*   ALK PHOS U/L 205*   ALT U/L 28   AST U/L 29     Results from last 7 days   Lab Units 23  0539   POTASSIUM mmol/L 3 3*   CHLORIDE mmol/L 105   CO2 mmol/L 20*   BUN mg/dL 22   CREATININE mg/dL 0 79   CALCIUM mg/dL 7 4*           Medications:    Current Facility-Administered Medications:   •  acetaminophen (TYLENOL) rectal suppository 650 mg, 650 mg, Rectal, Q6H PRN, Lisbeth Charles MD  •  acetaminophen (TYLENOL) tablet 650 mg, 650 mg, Oral, Q6H PRN, Ceasar Alcantara DO, 650 mg at 23 0236  •  [] amiodarone (CORDARONE) 900 mg in dextrose 5 % 500 mL infusion, 1 mg/min, Intravenous, Continuous, Stopped at 23 1643 **FOLLOWED BY** amiodarone (CORDARONE) 900 mg in dextrose 5 % 500 mL infusion, 0 5 mg/min, Intravenous, Continuous, ALEX Khan, Last Rate: 16 7 mL/hr at 23 1647, 0 5 mg/min at 23 1647  •  amLODIPine (NORVASC) tablet 10 mg, 10 mg, Oral, Daily, ALEX Khan, 10 mg at 23 1302  •  bisacodyl (DULCOLAX) rectal suppository 10 mg, 10 mg, Rectal, Daily PRN, ALEX Nguyen  •  buPROPion (WELLBUTRIN XL) 24 hr tablet 150 mg, 150 mg, Oral, Daily, Wan Alcantara DO, 150 mg at 23 0932  •  chlorhexidine (PERIDEX) 0 12 % oral rinse 15 mL, 15 mL, Mouth/Throat, Q12H [address], Broctoneddi Barragan PA-C, 15 mL at 23 2200  •  clopidogrel (PLAVIX) tablet 75 mg, 75 mg, Oral, Daily, Wan Alcantara DO, 75 mg at 23 0910  •  clotrimazole-betamethasone (LOTRISONE) 1-0 05 % cream, , Topical, BID, Wan Alcantara DO, Given at 23 1846  •  fentaNYL 1000 mcg in sodium chloride 0 9% 100mL infusion, 75 mcg/hr, Intravenous, Continuous, ALEX Nguyen, Last Rate: 7 5 mL/hr at 23, 75 mcg/hr at 23  •  fentanyl citrate (PF) 100 MCG/2ML 50 mcg, 50 mcg, Intravenous, Q2H PRN, Debora Heimlich Dirico, , 50 mcg at 01/20/23 1756  •  fluocinonide (LIDEX) 0 05 % ointment, , Topical, BID, Wan Alcantara DO, Given at 01/20/23 1846  •  insulin lispro (HumaLOG) 100 units/mL subcutaneous injection 1-5 Units, 1-5 Units, Subcutaneous, Q6H [address] **AND** Fingerstick Glucose (POCT), , , Q6H, Ana M Sanz PA-C  •  lisinopril (ZESTRIL) tablet 40 mg, 40 mg, Oral, Daily, Alveta Sherman, CRNP, 40 mg at 01/20/23 0910  •  meropenem (MERREM) 1,000 mg in sodium chloride 0 9 % 100 mL IVPB, 1,000 mg, Intravenous, Q8H, Wan Alcantara DO, Last Rate: 100 mL/hr at 01/21/23 0155, 1,000 mg at 01/21/23 0155  •  metoprolol tartrate (LOPRESSOR) tablet 25 mg, 25 mg, Oral, Q12H [address], Alveta Sherman, CRNP, 25 mg at 01/20/23 0911  •  pantoprazole (PROTONIX) injection 40 mg, 40 mg, Intravenous, Q24H [address], Alveta Sherman, CRNP, 40 mg at 01/20/23 8287  •  perflutren lipid microsphere (DEFINITY) injection, 0 4 mL/min, Intravenous, Once in imaging, Alveta Sherman, CRNP  •  polyethylene glycol (MIRALAX) packet 17 g, 17 g, Oral, Daily, Ryann Plana, CRNP, 17 g at 01/20/23 2200  •  potassium chloride oral solution 40 mEq, 40 mEq, Oral, TID, Silviano Hernandez MD  •  propofol (DIPRIVAN) 1000 mg in 100 mL infusion (premix), 5-50 mcg/kg/min, Intravenous, Titrated, Wan Alcantara DO, Last Rate: 13 mL/hr at 01/21/23 0526, 30 mcg/kg/min at 01/21/23 0526  •  senna (SENOKOT) tablet 8 6 mg, 1 tablet, Oral, HS, ALEX Leggett, 8 6 mg at 01/20/23 2200  •  sertraline (ZOLOFT) tablet 100 mg, 100 mg, Oral, Daily, Wan Alcantara DO, 100 mg at 01/20/23 0911  •  vancomycin (VANCOCIN) IVPB (premix in dextrose) 1,000 mg 200 mL, 1,000 mg, Intravenous, Q24H, Alfonso Avendano MD, Last Rate: 200 mL/hr at 01/20/23 2200, 1,000 mg at 01/20/23 2200    This note was completed in part utilizing [employer] Fluency Direct Software    Grammatical errors, random word insertions, spelling mistakes, and incomplete sentences may be an occasional consequence of this system secondary to software limitations, ambient noise, and hardware issues  If you have any questions or concerns about the content, text, or information contained within the body of this dictation, please contact the provider for clarification

## 2023-01-21 NOTE — TREATMENT PLAN
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

## 2023-01-21 NOTE — PROGRESS NOTES
[facility]  Progress Note - [address] [facility] [address] 1960, 65 y o  female MRN: [id number]  Unit/Bed#: ICU 12 Encounter: [id number]  Primary Care Provider: ALEX Santos   Date and time admitted to hospital: 1/17/2023  3:48 PM    Cardiac arrest [facility])  Assessment & Plan  · The etiology is not entirely known  DDX include primary cardiac event/ arrhythmia given her history of significant vascular disease, PE, Aspiration- PE is thought less likely given elevated INR, electrolyte abnormality, vs multifactorial 2/2 bacteremia  · We will check an EKG now- initial one following the arrest revealed No acute ST segment elevation but there is some evidence of up-sloping of her St segment suggesting ischemia  · amio started  · Cards following    * Sepsis due to urinary tract infection [facility])  Assessment & Plan  · The patient was admitted with sepsis on 1/17  On the morning of 1/18 was found unresponsive and without a pulse  ROSC was obtained following 1mg epinephrine and defibrillation with 200 joules  · We will change her antibiotics to meropenum based on her sensitivities  · She is noted to have polymicrobial bacteremia with enterococcus and enterobacter   · monitor her fever curve, procal and WBC level  · Her arrest may be related to ischemia in the setting of infection but given her significant vascular issues and coronary calcifications seen on CT a w/u is needed   · F/U CT abdomen  And pelvis   · LEON scheduled for monday     Hypomagnesemia  Assessment & Plan  -replete these with a goal to maintain the level > 2  -given 2gm o/n    Troponin level elevated  Assessment & Plan  · Type II MI likely 2/2 demand  · Cardiology following  No immediate ischemic w/u needed at this time   Will need after acute illness  · ECHO 1/18- ef 78-46%      Metabolic acidosis  Assessment & Plan  · In the setting of cardiac arrest, her lactic acid was elevated and now normalized  · We will continue to monitor her electrolytes and acid/base balance    Hyponatremia  Assessment & Plan    · Etiology, possibly hypovolemic due to sepsis but was resuscitated and had BP ranges that were on the higher end   · She is on seroquel, zoloft and  lisinopril which can all contribute to SIADH development, particularly the zoloft  · We will continue to monitor her sodium level and hold her zoloft, seroquel and lisinopril for now  ·     Metabolic encephalopathy  Assessment & Plan  -cont neuro checks  -cth 1/18- no acute finding    PAD (peripheral artery disease) ([facility])  Assessment & Plan  -cont plavix for now    Hypokalemia  Assessment & Plan  · Her potassium was 2 9 then 3 post arrest   · Her hypokalemia could be a contributing factor  · We will replete her potassium- our goal will be to maintain the level > 4  · Given 60 then 40 o/n for a K of 3 3    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 [facility])  Assessment & Plan  Lab Results   Component Value Date    HGBA1C 9 8 (H) 10/25/2022       Recent Labs     01/20/23  1218 01/20/23  1844 01/21/23  0000 01/21/23  0545   POCGLU 114 121 121 80       Blood Sugar Average: Last 72 hrs:  (P) 160     -d/c insulin gtt and start ssi      Moderate protein-calorie malnutrition ([facility] [address] )  Assessment & Plan  Malnutrition Findings:       BMI Findings: Body mass index is 27 29 kg/m²  -start tube feeds today if stays intubated      ----------------------------------------------------------------------------------------  HPI/24hr events: No acute events overnight  Patient remains hemodynamically stable      Patient appropriate for transfer out of the ICU today?: No  Disposition: Admit to Critical Care   Code Status: Level 1 - Full Code  ---------------------------------------------------------------------------------------  SUBJECTIVE  Patient is sedated and intubated     Review of Systems  Review of systems was unable to be performed secondary to Sedated and intubated   ---------------------------------------------------------------------------------------  OBJECTIVE    Vitals   Vitals:    23 0430 23 0500 23 0530 23 0600   BP:       Pulse: 82 80 80 78   Resp: 16 16 17 16   Temp: 99 7 °F (37 6 °C) 99 3 °F (37 4 °C) 99 °F (37 2 °C) 99 °F (37 2 °C)   TempSrc:       SpO2: 100% 100% 100% 99%   Weight:       Height:         Temp (24hrs), Av °F (37 2 °C), Min:97 9 °F (36 6 °C), Max:100 °F (37 8 °C)  Current: Temperature: 99 °F (37 2 °C)  Arterial Line BP: 140/46  Arterial Line MAP (mmHg): 78 mmHg    Respiratory:  SpO2: SpO2: 99 %       Invasive/non-invasive ventilation settings   Respiratory    Lab Data (Last 4 hours)    None         O2/Vent Data (Last 4 hours)    None                Physical Exam  Vitals and nursing note reviewed  Constitutional:       General: She is not in acute distress  HENT:      Head: Normocephalic and atraumatic  Eyes:      Conjunctiva/sclera: Conjunctivae normal    Cardiovascular:      Rate and Rhythm: Normal rate and regular rhythm  Heart sounds: No murmur heard  Pulmonary:      Effort: Pulmonary effort is normal  No respiratory distress  Breath sounds: Normal breath sounds  Comments: Decreased breath sounds bilaterally  Abdominal:      General: Bowel sounds are normal       Palpations: Abdomen is soft  Tenderness: There is no guarding  Musculoskeletal:         General: No swelling  Cervical back: Neck supple  Skin:     General: Skin is warm and dry  Capillary Refill: Capillary refill takes less than 2 seconds  Neurological:      Mental Status: She is alert     Psychiatric:      Comments: Sedated              Laboratory and Diagnostics:  Results from last 7 days   Lab Units 23  0539 23  2135 23  1501 23  0544 23  0448 23  1935 23  0832 23  0530 23  1605   WBC Thousand/uL 15 66*  --   --  13 89* 17 24* 14 53* 26 92* 16 42* 16 48*   HEMOGLOBIN g/dL 7 2* 7 7* 6 4* 5 4* 7 2* 7 7* 8 9* 9 6* 9 7*   HEMATOCRIT % 21 3* 22 9* 19 1* 17 5* 22 4* 24 8* 29 0* 30 5* 31 2*   PLATELETS Thousands/uL 239  --   --  248 232 253 332 267 269   NEUTROS PCT % 69  --   --   --  81* 83*  --  89* 88*   MONOS PCT % 7  --   --   --  11 10  --  5 6   MONO PCT %  --   --   --   --   --   --  8  --   --      Results from last 7 days   Lab Units 01/20/23  1501 01/20/23  0544 01/19/23  0448 01/18/23  1935 01/18/23  0832 01/18/23  0530 01/17/23  1605   SODIUM mmol/L 135 135 136 137 132* 133* 132*   POTASSIUM mmol/L 3 1* 2 9* 3 8 3 3* 3 0* 2 9* 3 5   CHLORIDE mmol/L 103 101 103 105 97 96 98   CO2 mmol/L 23 22 21 23 16* 21 22   ANION GAP mmol/L 9 12 12 9 19* 16* 12   BUN mg/dL 24 24 23 22 15 13 20   CREATININE mg/dL 0 84 0 84 0 91 0 97 0 82 0 68 0 82   CALCIUM mg/dL 7 3* 7 3* 7 4* 7 5* 7 1* 7 9* 8 4   GLUCOSE RANDOM mg/dL 104 112 112 151* 351* 210* 95   ALT U/L  --  40 51 71* 101* 10 9   AST U/L  --  39 47* 89* 174* 20 15   ALK PHOS U/L  --  170* 121* 134* 132* 140* 131*   ALBUMIN g/dL  --  2 3* 2 5* 2 6* 2 7* 3 1* 3 2*   TOTAL BILIRUBIN mg/dL  --  0 38 0 36 0 37 0 49 0 52 0 37     Results from last 7 days   Lab Units 01/20/23  0544 01/19/23  1242 01/18/23  0832 01/17/23  1605   MAGNESIUM mg/dL 2 1 2 4 1 8* 1 5*   PHOSPHORUS mg/dL 3 9 3 7 5 2*  --       Results from last 7 days   Lab Units 01/20/23  0905 01/18/23  0530 01/17/23  1605   INR  2 97* 4 29* 2 96*   PTT seconds  --   --  57*          Results from last 7 days   Lab Units 01/18/23  1050 01/18/23  0834 01/17/23  1605   LACTIC ACID mmol/L 1 5 6 7* 1 1     ABG:  Results from last 7 days   Lab Units 01/18/23  1036   PH ART  7 421   PCO2 ART mm Hg 29 7*   PO2 ART mm Hg 223 0*   HCO3 ART mmol/L 18 9*   BASE EXC ART mmol/L -4 4   ABG SOURCE  Line, Arterial     VBG:  Results from last 7 days   Lab Units 01/18/23  1036 01/17/23  1605   PH JORDEN   --  7 488*   PCO2 JORDEN mm Hg  --  29 3*   PO2 JORDEN mm Hg  --  54 0* HCO3 JORDEN mmol/L  --  21 7*   BASE EXC JORDEN mmol/L  --  -0 9   ABG SOURCE  Line, Arterial  --      Results from last 7 days   Lab Units 01/17/23  2116 01/17/23  1605   PROCALCITONIN ng/ml 1 37* 0 27*       Micro  Results from last 7 days   Lab Units 01/19/23  1226 01/19/23  0019 01/17/23  1641 01/17/23  1606 01/17/23  1553   BLOOD CULTURE  No Growth at 24 hrs  No Growth at 24 hrs   --   --  Enterobacter cloacae*  Enterococcus faecalis* Enterococcus faecalis*   GRAM STAIN RESULT   --   --   --  Gram negative rods*  Gram positive cocci in pairs and chains* Gram positive cocci in pairs and chains*   URINE CULTURE   --   --  >100,000 cfu/ml Enterococcus faecalis*  <10,000 cfu/ml Citrobacter freundii*  <10,000 cfu/ml Klebsiella variicola*  --   --    MRSA CULTURE ONLY   --  No Methicillin Resistant Staphlyococcus aureus (MRSA) isolated  --   --   --        EKG: Not done today   Imaging: I have personally reviewed pertinent reports  Intake and Output  I/O       01/19 0701  01/20 0700 01/20 0701  01/21 0700    I V  (mL/kg) 1039 2 (14 4) 1122 (15 6)    Blood  648 3    NG/GT  250    IV Piggyback 250 550    Feedings  95    Total Intake(mL/kg) 1289 2 (17 9) 2665 3 (37)    Urine (mL/kg/hr) 1250 (0 7) 570 (0 3)    Emesis/NG output 300     Stool 0 0    Total Output 1550 570    Net -260 8 +2095 3          Unmeasured Stool Occurrence 1 x 1 x          Height and Weights   Height: 5' 4" (162 6 cm)  IBW (Ideal Body Weight): 54 7 kg  Body mass index is 27 29 kg/m²  Weight (last 2 days)     None            Nutrition       Diet Orders   (From admission, onward)             Start     Ordered    01/20/23 1819  Diet Enteral/Parenteral; Tube Feeding No Oral Diet; Jevity 1 2 Alireza; Continuous; 20; 100;  Water; Every 6 hours  Diet effective now        References:    Nutrtion Support Algorithm Enteral vs  Parenteral   Question Answer Comment   Diet Type Enteral/Parenteral    Enteral/Parenteral Tube Feeding No Oral Diet    Tube Feeding Formula: Jevity 1 2 Alireza    Bolus/Cyclic/Continuous Continuous    Tube Feeding Goal Rate (mL/hr): 20    Tube Feeding flush (mL): 100    Water Flush type: Water    Water flush frequency: Every 6 hours    RD to adjust diet per protocol?  Yes        01/20/23 1820                  Active Medications  Scheduled Meds:  Current Facility-Administered Medications   Medication Dose Route Frequency Provider Last Rate   • acetaminophen  650 mg Rectal Q6H PRN Wan Alcantara DO     • acetaminophen  650 mg Oral Q6H PRN Wan lAcantara DO     • amiodarone  0 5 mg/min Intravenous Continuous BEN RichterNP 0 5 mg/min (01/20/23 5357)   • amLODIPine  10 mg Oral Daily ALEX Richter     • bisacodyl  10 mg Rectal Daily PRN BEN HongNP     • buPROPion  150 mg Oral Daily Wan Alcantara DO     • chlorhexidine  15 mL Mouth/Throat Q12H [facility] HOME Luz Rand PA-C     • clopidogrel  75 mg Oral Daily Wan Alcantara DO     • clotrimazole-betamethasone   Topical BID Wan Alcantara DO     • fentaNYL  75 mcg/hr Intravenous Continuous BEN HongNP 75 mcg/hr (01/21/23 2735)   • fentanyl citrate (PF)  50 mcg Intravenous Q2H PRN Wan Alcantara DO     • fluocinonide   Topical BID Wan Alcantara DO     • insulin lispro  1-5 Units Subcutaneous Q6H [facility] HOME Luz Rand PA-C     • lisinopril  40 mg Oral Daily ALEX Richter     • meropenem  1,000 mg Intravenous Q8H Wan Alcantara DO 1,000 mg (01/21/23 0155)   • metoprolol tartrate  25 mg Oral Q12H [address] [facility] 3048, CRNP     • multi-electrolyte  500 mL Intravenous Once ALEX Hong     • pantoprazole  40 mg Intravenous Q24H [facility] ALEX Poritllo     • perflutren lipid microsphere  0 4 mL/min Intravenous Once in imaging ALEX Richter     • polyethylene glycol  17 g Oral Daily BEN HongNP     • propofol  5-50 mcg/kg/min Intravenous Titrated Wan Alcantara DO 30 mcg/kg/min (01/21/23 1635)   • senna  1 tablet Oral HS Georgian Holter, CRNP     • sertraline  100 mg Oral Daily Wan Alcantara DO     • vancomycin  1,000 mg Intravenous Q24H Willi Jorgensen MD 1,000 mg (01/20/23 2200)     Continuous Infusions:  amiodarone, 0 5 mg/min, Last Rate: 0 5 mg/min (01/20/23 1647)  fentaNYL, 75 mcg/hr, Last Rate: 75 mcg/hr (01/21/23 0229)  propofol, 5-50 mcg/kg/min, Last Rate: 30 mcg/kg/min (01/21/23 0526)      PRN Meds:   acetaminophen, 650 mg, Q6H PRN  acetaminophen, 650 mg, Q6H PRN  bisacodyl, 10 mg, Daily PRN  fentanyl citrate (PF), 50 mcg, Q2H PRN  perflutren lipid microsphere, 0 4 mL/min, Once in imaging        Invasive Devices Review  Invasive Devices     Peripheral Intravenous Line  Duration           Peripheral IV 01/20/23 Left Forearm <1 day    Peripheral IV 01/20/23 Right Forearm <1 day    Peripheral IV 01/20/23 Upper;Medial Arm <1 day          Arterial Line  Duration           Arterial Line 01/18/23 Radial 2 days          Drain  Duration           Urethral Catheter Temperature probe 16 Fr  3 days    NG/OG/Enteral Tube Orogastric Left mouth 2 days          Airway  Duration           ETT  Cuffed 7 5 mm 2 days                Rationale for remaining devices: NA   ---------------------------------------------------------------------------------------  Advance Directive and Living Will:      Power of : Yes  POLST:    ---------------------------------------------------------------------------------------  Care Time Delivered: >30 minutes         Carolina Mac MD      Portions of the record may have been created with voice recognition software  Occasional wrong word or "sound a like" substitutions may have occurred due to the inherent limitations of voice recognition software    Read the chart carefully and recognize, using context, where substitutions have occurred

## 2023-01-21 NOTE — ASSESSMENT & PLAN NOTE
· The patient was admitted with sepsis on 1/17  On the morning of 1/18 was found unresponsive and without a pulse   ROSC was obtained following 1mg epinephrine and defibrillation with 200 joules  · We will change her antibiotics to meropenum based on her sensitivities  · She is noted to have polymicrobial bacteremia with enterococcus and enterobacter   · monitor her fever curve, procal and WBC level  · Her arrest may be related to ischemia in the setting of infection but given her significant vascular issues and coronary calcifications seen on CT a w/u is needed   · F/U CT abdomen  And pelvis   · LEON scheduled for monday

## 2023-01-21 NOTE — ACP (ADVANCE CARE PLANNING)
Spoke to Patient's daughter Vidal Magaña regarding code status and informed her of the potential plan to extubate the patient today pending her SBT  She stated that her family has decided that if her mother was to have another code, they do not not want her to be resuscitated, NO CPR   No re intubation

## 2023-01-21 NOTE — ASSESSMENT & PLAN NOTE
Lab Results   Component Value Date    HGBA1C 9 8 (H) 10/25/2022       Recent Labs     01/20/23  1218 01/20/23  1844 01/21/23  0000 01/21/23  0545   POCGLU 114 121 121 80       Blood Sugar Average: Last 72 hrs:  (P) 160     -d/c insulin gtt and start ssi

## 2023-01-21 NOTE — PROGRESS NOTES
Progress Note - Infectious Disease   Varinder Avina 65 y o  female MRN: [id number]  Unit/Bed#: ICU 12 Encounter: [id number]      Impression/Plan:    1   Sepsis, POA   Patient met sepsis criteria on presentation   Sources are 2 and 3  Course complicated by 4   Extensive resistance developing on prior cultures   2D echo technically difficult study   Extensive CT imaging without any focal findings otherwise    -Antibiotics as below  -Continue to trend fever curve/vitals  -Repeat CBC and CMP tomorrow  -Follow-up pending blood cultures  -Additional supportive care as per primary  -Additional interventions pending clinical course     2   Polymicrobial bacteremia   Blood cultures have isolated Enterobacter and Enterococcus faecalis   Consider urinary source, no other ectopic focus of infection on extensive imaging   2D echo limited   Consider also endovascular source given 4   Other potential sources are the patient's wound on her right AKA site although this does not appear acutely infected; there is ecchymosis present and foci of air on imaging which is likely due to injury   No other devices appreciated on  imaging   Patient unable to provide any extensive history  Continue meropenem, based on current culture so far  Continue vancomycin  Continue to trend fever curve/vitals  Repeat CBC and CMP tomorrow  Follow-up repeat blood cultures  Recommend formal transesophageal echo, when possible  Anticipate at minimum 2 weeks of IV antibiotic for this issue  Additional interventions pending clinical course     3   Complicated urinary tract infection   Patient unable to provide any history at this time  Emily Dent was grossly abnormal on presentation   Possible source of the above   Urine cultures reviewed    Continue antibiotic as above  Monitor abdominal exam  Catheters as per primary     4   Cardiac arrest   Suspect that this may have been unrelated to the above   Patient with multiple other comorbidities as well   Ongoing care and work-up as per critical care/cardiology  Patient extubated 2023     5   Transaminitis   Acute elevation noted likely in the setting of 4   We will need to monitor LFTs however on meropenem   Improving  Antibiotic as above  Repeat LFTs tomorrow  Monitor abdominal exam     Above plan discussed in detail with critical care team  ID will follow  Antibiotics:  Meropenem/vancomycin 4  Total antibiotic 5    Subjective:  Patient was seen just after extubation to 3 L nasal cannula  She was awake but not interactive or answering questions  Appeared to be in pain when I examined her right AKA site  T-max 100  The patient has not required pressors  Objective:  Vitals:  Temp:  [97 9 °F (36 6 °C)-100 °F (37 8 °C)] 98 6 °F (37 °C)  HR:  [72-90] 84  Resp:  [6-27] 13  BP: (126-169)/(52-82) 126/54  SpO2:  [99 %-100 %] 99 %  Temp (24hrs), Av °F (37 2 °C), Min:97 9 °F (36 6 °C), Max:100 °F (37 8 °C)  Current: Temperature: 98 6 °F (37 °C)    Physical Exam:   General Appearance:   Chronically ill-appearing debilitated, but nontoxic   Throat: Oropharynx moist without lesions  Lungs:    Decreased breath sounds bilaterally   Heart:  RRR; no murmur, rub or gallop   Abdomen/   Soft, non-tender, non-distended, positive bowel sounds  Jacinto in place   Extremities: Left AKA stump no abnormalities  Right AKA wound appears clean without evidence of purulence or cellulitis  Ecchymosis was surrounding wound site   Skin: No new rashes or lesions  Labs:    All pertinent labs and imaging studies were personally reviewed  Results from last 7 days   Lab Units 23  0539 23  2135 23  1501 23  0544 23  0448   WBC Thousand/uL 15 66*  --   --  13 89* 17 24*   HEMOGLOBIN g/dL 7 2* 7 7* 6 4* 5 4* 7 2*   PLATELETS Thousands/uL 239  --   --  248 232     Results from last 7 days   Lab Units 23  0539 23  1501 23  0544 23  0448   SODIUM mmol/L 136 135 135 136   POTASSIUM mmol/L 3 3* 3 1* 2 9* 3 8   CHLORIDE mmol/L 105 103 101 103   CO2 mmol/L 20* 23 22 21   BUN mg/dL 22 24 24 23   CREATININE mg/dL 0 79 0 84 0 84 0 91   EGFR ml/min/1 73sq m 81 75 75 68   CALCIUM mg/dL 7 4* 7 3* 7 3* 7 4*   AST U/L 29  --  39 47*   ALT U/L 28  --  40 51   ALK PHOS U/L 205*  --  170* 121*     Results from last 7 days   Lab Units 01/17/23  2116 01/17/23  1605   PROCALCITONIN ng/ml 1 37* 0 27*                   Micro:  Results from last 7 days   Lab Units 01/21/23  0538 01/19/23  1226 01/19/23  0019 01/17/23  1641 01/17/23  1606 01/17/23  1553   BLOOD CULTURE  Received in Microbiology Lab  Culture in Progress  Received in Microbiology Lab  Culture in Progress  No Growth at 24 hrs    No Growth at 24 hrs   --   --  Enterobacter cloacae*  Enterococcus faecalis* Enterococcus faecalis*   GRAM STAIN RESULT   --   --   --   --  Gram negative rods*  Gram positive cocci in pairs and chains* Gram positive cocci in pairs and chains*   URINE CULTURE   --   --   --  >100,000 cfu/ml Enterococcus faecalis*  <10,000 cfu/ml Citrobacter freundii*  <10,000 cfu/ml Klebsiella variicola*  --   --    MRSA CULTURE ONLY   --   --  No Methicillin Resistant Staphlyococcus aureus (MRSA) isolated  --   --   --

## 2023-01-21 NOTE — PLAN OF CARE
Problem: MOBILITY - ADULT  Goal: Maintain or return to baseline ADL function  Description: INTERVENTIONS:  -  Assess patient's ability to carry out ADLs; assess patient's baseline for ADL function and identify physical deficits which impact ability to perform ADLs (bathing, care of mouth/teeth, toileting, grooming, dressing, etc )  - Assess/evaluate cause of self-care deficits   - Assess range of motion  - Assess patient's mobility; develop plan if impaired  - Assess patient's need for assistive devices and provide as appropriate  - Encourage maximum independence but intervene and supervise when necessary  - Involve family in performance of ADLs  - Assess for home care needs following discharge   - Consider OT consult to assist with ADL evaluation and planning for discharge  - Provide patient education as appropriate  Outcome: Progressing  Goal: Maintains/Returns to pre admission functional level  Description: INTERVENTIONS:  - Perform BMAT or MOVE assessment daily    - Set and communicate daily mobility goal to care team and patient/family/caregiver     - Collaborate with rehabilitation services on mobility goals if consulted  - Out of bed for toileting  - Record patient progress and toleration of activity level   Outcome: Progressing     Problem: Prexisting or High Potential for Compromised Skin Integrity  Goal: Skin integrity is maintained or improved  Description: INTERVENTIONS:  - Identify patients at risk for skin breakdown  - Assess and monitor skin integrity  - Assess and monitor nutrition and hydration status  - Monitor labs   - Assess for incontinence   - Turn and reposition patient  - Assist with mobility/ambulation  - Relieve pressure over bony prominences  - Avoid friction and shearing  - Provide appropriate hygiene as needed including keeping skin clean and dry  - Evaluate need for skin moisturizer/barrier cream  - Collaborate with interdisciplinary team   - Patient/family teaching  - Consider wound care consult   Outcome: Progressing     Problem: PAIN - ADULT  Goal: Verbalizes/displays adequate comfort level or baseline comfort level  Description: Interventions:  - Encourage patient to monitor pain and request assistance  - Assess pain using appropriate pain scale  - Administer analgesics based on type and severity of pain and evaluate response  - Implement non-pharmacological measures as appropriate and evaluate response  - Consider cultural and social influences on pain and pain management  - Notify physician/advanced practitioner if interventions unsuccessful or patient reports new pain  Outcome: Progressing     Problem: INFECTION - ADULT  Goal: Absence or prevention of progression during hospitalization  Description: INTERVENTIONS:  - Assess and monitor for signs and symptoms of infection  - Monitor lab/diagnostic results  - Monitor all insertion sites, i e  indwelling lines, tubes, and drains  - Monitor endotracheal if appropriate and nasal secretions for changes in amount and color  - [place] appropriate cooling/warming therapies per order  - Administer medications as ordered  - Instruct and encourage patient and family to use good hand hygiene technique  - Identify and instruct in appropriate isolation precautions for identified infection/condition  Outcome: Progressing  Goal: Absence of fever/infection during neutropenic period  Description: INTERVENTIONS:  - Monitor WBC    Outcome: Progressing     Problem: SAFETY ADULT  Goal: Maintain or return to baseline ADL function  Description: INTERVENTIONS:  -  Assess patient's ability to carry out ADLs; assess patient's baseline for ADL function and identify physical deficits which impact ability to perform ADLs (bathing, care of mouth/teeth, toileting, grooming, dressing, etc )  - Assess/evaluate cause of self-care deficits   - Assess range of motion  - Assess patient's mobility; develop plan if impaired  - Assess patient's need for assistive devices and provide as appropriate  - Encourage maximum independence but intervene and supervise when necessary  - Involve family in performance of ADLs  - Assess for home care needs following discharge   - Consider OT consult to assist with ADL evaluation and planning for discharge  - Provide patient education as appropriate  Outcome: Progressing  Goal: Maintains/Returns to pre admission functional level  Description: INTERVENTIONS:  - Perform BMAT or MOVE assessment daily    - Set and communicate daily mobility goal to care team and patient/family/caregiver     - Collaborate with rehabilitation services on mobility goals if consulted  - Out of bed for toileting  - Record patient progress and toleration of activity level   Outcome: Progressing  Goal: Patient will remain free of falls  Description: INTERVENTIONS:  - Educate patient/family on patient safety including physical limitations  - Instruct patient to call for assistance with activity   - Consult OT/PT to assist with strengthening/mobility   - Keep Call bell within reach  - Keep bed low and locked with side rails adjusted as appropriate  - Keep care items and personal belongings within reach  - Initiate and maintain comfort rounds  - Make Fall Risk Sign visible to staff  - Apply yellow socks and bracelet for high fall risk patients  - Consider moving patient to room near nurses station  Outcome: Progressing     Problem: DISCHARGE PLANNING  Goal: Discharge to home or other facility with appropriate resources  Description: INTERVENTIONS:  - Identify barriers to discharge w/patient and caregiver  - Arrange for needed discharge resources and transportation as appropriate  - Identify discharge learning needs (meds, wound care, etc )  - Arrange for interpretive services to assist at discharge as needed  - Refer to Case Management Department for coordinating discharge planning if the patient needs post-hospital services based on physician/advanced practitioner order or complex needs related to functional status, cognitive ability, or social support system  Outcome: Progressing     Problem: Knowledge Deficit  Goal: Patient/family/caregiver demonstrates understanding of disease process, treatment plan, medications, and discharge instructions  Description: Complete learning assessment and assess knowledge base  Interventions:  - Provide teaching at level of understanding  - Provide teaching via preferred learning methods  Outcome: Progressing     Problem: Nutrition/Hydration-ADULT  Goal: Nutrient/Hydration intake appropriate for improving, restoring or maintaining nutritional needs  Description: Monitor and assess patient's nutrition/hydration status for malnutrition  Collaborate with interdisciplinary team and initiate plan and interventions as ordered  Monitor patient's weight and dietary intake as ordered or per policy  Utilize nutrition screening tool and intervene as necessary  Determine patient's food preferences and provide high-protein, high-caloric foods as appropriate       INTERVENTIONS:  - Monitor oral intake, urinary output, labs, and treatment plans  - Assess nutrition and hydration status and recommend course of action  - Evaluate amount of meals eaten  - Assist patient with eating if necessary   - Allow adequate time for meals  - Recommend/ encourage appropriate diets, oral nutritional supplements, and vitamin/mineral supplements  - Order, calculate, and assess calorie counts as needed  - Recommend, monitor, and adjust tube feedings and TPN/PPN based on assessed needs  - Assess need for intravenous fluids  - Provide specific nutrition/hydration education as appropriate  - Include patient/family/caregiver in decisions related to nutrition  Outcome: Progressing     Problem: SAFETY,RESTRAINT: NV/NON-SELF DESTRUCTIVE BEHAVIOR  Goal: Remains free of harm/injury (restraint for non violent/non self-detsructive behavior)  Description: INTERVENTIONS:  - Instruct patient/family regarding restraint use   - Assess and monitor physiologic and psychological status   - Provide interventions and comfort measures to meet assessed patient needs   - Identify and implement measures to help patient regain control  - Assess readiness for release of restraint   Outcome: Progressing  Goal: Returns to optimal restraint-free functioning  Description: INTERVENTIONS:  - Assess the patient's behavior and symptoms that indicate continued need for restraint  - Identify and implement measures to help patient regain control  - Assess readiness for release of restraint   Outcome: Progressing     Problem: COPING  Goal: Pt/Family able to verbalize concerns and demonstrate effective coping strategies  Description: INTERVENTIONS:  - Assist patient/family to identify coping skills, available support systems and cultural and spiritual values  - Provide emotional support, including active listening and acknowledgement of concerns of patient and caregivers  - Reduce environmental stimuli, as able  - Provide patient education  - Assess for spiritual pain/suffering and initiate spiritual care, including notification of Pastoral Care or chidi based community as needed  - Assess effectiveness of coping strategies  Outcome: Progressing  Goal: Will report anxiety at manageable levels  Description: INTERVENTIONS:  - Administer medication as ordered  - Teach and encourage coping skills  - Provide emotional support  - Assess patient/family for anxiety and ability to cope  Outcome: Progressing

## 2023-01-21 NOTE — PROGRESS NOTES
Tana Rico is a 65 y o  female who is currently ordered Vancomycin IV with management by the Pharmacy Consult service  Relevant clinical data and objective / subjective history reviewed  Vancomycin Assessment:  Indication and Goal AUC/Trough: Other, sepsis, -600, trough 15-20  Clinical Status: stable  Micro:   Blood Culture : In process  Renal Function:  SCr: 0 79 mg/dL  CrCl: 72 8 mL/min  Renal replacement: Not on dialysis  Days of Therapy: 5  Current Dose: 1000mg IV every 24 hours  Current Trough: 26 1mcg/ml (extrapolated level at 23 at ~2000 would be ~14 93mcg/ml)  Vancomycin Plan:  New Dosinmg IV every 24 hours  Estimated AUC: 466 mcg*hr/mL  Estimated Trough: 13 9 mcg/mL  Next Level: 23 with am labs (earlier if change in renal function)  Renal Function Monitoring: Daily BMP and [place] will continue to follow closely for s/sx of nephrotoxicity, infusion reactions and appropriateness of therapy  BMP and CBC will be ordered per protocol  We will continue to follow the patient’s culture results and clinical progress daily      Arabella Richards, Pharmacist

## 2023-01-22 PROBLEM — E11.3391: Chronic | Status: ACTIVE | Noted: 2021-01-25

## 2023-01-22 PROBLEM — D75.829 HIT (HEPARIN-INDUCED THROMBOCYTOPENIA): Chronic | Status: ACTIVE | Noted: 2022-11-11

## 2023-01-22 PROBLEM — I73.9 PAD (PERIPHERAL ARTERY DISEASE) (HCC): Chronic | Status: ACTIVE | Noted: 2022-10-25

## 2023-01-22 LAB
ALBUMIN SERPL BCP-MCNC: 2.3 G/DL (ref 3.5–5)
ALP SERPL-CCNC: 199 U/L (ref 34–104)
ALT SERPL W P-5'-P-CCNC: 19 U/L (ref 7–52)
ANION GAP SERPL CALCULATED.3IONS-SCNC: 7 MMOL/L (ref 4–13)
AST SERPL W P-5'-P-CCNC: 16 U/L (ref 13–39)
BACTERIA BLD CULT: ABNORMAL
BACTERIA BLD CULT: ABNORMAL
BILIRUB SERPL-MCNC: 0.47 MG/DL (ref 0.2–1)
BLAIMP ISLT/SPM QL: NOT DETECTED
BLAKPC ISLT/SPM QL: NOT DETECTED
BLAOXA-48 ISLT/SPM QL: NOT DETECTED
BLAVIM ISLT/SPM QL: NOT DETECTED
BUN SERPL-MCNC: 15 MG/DL (ref 5–25)
CALCIUM ALBUM COR SERPL-MCNC: 8.6 MG/DL (ref 8.3–10.1)
CALCIUM SERPL-MCNC: 7.2 MG/DL (ref 8.4–10.2)
CHLORIDE SERPL-SCNC: 110 MMOL/L (ref 96–108)
CO2 SERPL-SCNC: 24 MMOL/L (ref 21–32)
CREAT SERPL-MCNC: 0.51 MG/DL (ref 0.6–1.3)
ERYTHROCYTE [DISTWIDTH] IN BLOOD BY AUTOMATED COUNT: 15.9 % (ref 11.6–15.1)
GFR SERPL CREATININE-BSD FRML MDRD: 104 ML/MIN/1.73SQ M
GLUCOSE SERPL-MCNC: 144 MG/DL (ref 65–140)
GLUCOSE SERPL-MCNC: 145 MG/DL (ref 65–140)
GLUCOSE SERPL-MCNC: 157 MG/DL (ref 65–140)
GLUCOSE SERPL-MCNC: 166 MG/DL (ref 65–140)
GRAM STN SPEC: ABNORMAL
GRAM STN SPEC: ABNORMAL
HCT VFR BLD AUTO: 21.9 % (ref 34.8–46.1)
HGB BLD-MCNC: 7.5 G/DL (ref 11.5–15.4)
MAGNESIUM SERPL-MCNC: 1.7 MG/DL (ref 1.9–2.7)
MCH RBC QN AUTO: 28.5 PG (ref 26.8–34.3)
MCHC RBC AUTO-ENTMCNC: 34.2 G/DL (ref 31.4–37.4)
MCV RBC AUTO: 83 FL (ref 82–98)
NDM CEPHEID: NOT DETECTED
PHOSPHATE SERPL-MCNC: 2.6 MG/DL (ref 2.3–4.1)
PLATELET # BLD AUTO: 289 THOUSANDS/UL (ref 149–390)
PMV BLD AUTO: 8.6 FL (ref 8.9–12.7)
POTASSIUM SERPL-SCNC: 3.6 MMOL/L (ref 3.5–5.3)
PROT SERPL-MCNC: 4.4 G/DL (ref 6.4–8.4)
RBC # BLD AUTO: 2.63 MILLION/UL (ref 3.81–5.12)
SODIUM SERPL-SCNC: 141 MMOL/L (ref 135–147)
WBC # BLD AUTO: 20.62 THOUSAND/UL (ref 4.31–10.16)

## 2023-01-22 RX ORDER — LABETALOL HYDROCHLORIDE 5 MG/ML
10 INJECTION, SOLUTION INTRAVENOUS ONCE
Status: COMPLETED | OUTPATIENT
Start: 2023-01-22 | End: 2023-01-22

## 2023-01-22 RX ORDER — HYDRALAZINE HYDROCHLORIDE 20 MG/ML
10 INJECTION INTRAMUSCULAR; INTRAVENOUS EVERY 6 HOURS PRN
Status: DISCONTINUED | OUTPATIENT
Start: 2023-01-22 | End: 2023-01-22

## 2023-01-22 RX ORDER — DEXMEDETOMIDINE HYDROCHLORIDE 4 UG/ML
.1-.7 INJECTION, SOLUTION INTRAVENOUS
Status: DISCONTINUED | OUTPATIENT
Start: 2023-01-22 | End: 2023-01-22

## 2023-01-22 RX ORDER — HYDRALAZINE HYDROCHLORIDE 20 MG/ML
5 INJECTION INTRAMUSCULAR; INTRAVENOUS EVERY 6 HOURS PRN
Status: DISCONTINUED | OUTPATIENT
Start: 2023-01-22 | End: 2023-01-22

## 2023-01-22 RX ORDER — HYDRALAZINE HYDROCHLORIDE 20 MG/ML
10 INJECTION INTRAMUSCULAR; INTRAVENOUS ONCE
Status: COMPLETED | OUTPATIENT
Start: 2023-01-22 | End: 2023-01-22

## 2023-01-22 RX ORDER — MAGNESIUM SULFATE HEPTAHYDRATE 40 MG/ML
2 INJECTION, SOLUTION INTRAVENOUS ONCE
Status: COMPLETED | OUTPATIENT
Start: 2023-01-22 | End: 2023-01-22

## 2023-01-22 RX ADMIN — INSULIN LISPRO 1 UNITS: 100 INJECTION, SOLUTION INTRAVENOUS; SUBCUTANEOUS at 13:00

## 2023-01-22 RX ADMIN — SENNOSIDES 8.6 MG: 8.6 TABLET ORAL at 22:50

## 2023-01-22 RX ADMIN — HYDRALAZINE HYDROCHLORIDE 5 MG: 20 INJECTION, SOLUTION INTRAMUSCULAR; INTRAVENOUS at 04:28

## 2023-01-22 RX ADMIN — MAGNESIUM SULFATE HEPTAHYDRATE 2 G: 40 INJECTION, SOLUTION INTRAVENOUS at 10:49

## 2023-01-22 RX ADMIN — DEXMEDETOMIDINE HYDROCHLORIDE 0.2 MCG/KG/HR: 400 INJECTION INTRAVENOUS at 01:26

## 2023-01-22 RX ADMIN — METOPROLOL TARTRATE 25 MG: 25 TABLET, FILM COATED ORAL at 20:39

## 2023-01-22 RX ADMIN — SERTRALINE HYDROCHLORIDE 100 MG: 100 TABLET, FILM COATED ORAL at 12:10

## 2023-01-22 RX ADMIN — VANCOMYCIN HYDROCHLORIDE 1250 MG: 5 INJECTION, POWDER, LYOPHILIZED, FOR SOLUTION INTRAVENOUS at 19:50

## 2023-01-22 RX ADMIN — HYDRALAZINE HYDROCHLORIDE 5 MG: 20 INJECTION, SOLUTION INTRAMUSCULAR; INTRAVENOUS at 18:10

## 2023-01-22 RX ADMIN — CLOPIDOGREL BISULFATE 75 MG: 75 TABLET ORAL at 12:10

## 2023-01-22 RX ADMIN — METOPROLOL TARTRATE 25 MG: 25 TABLET, FILM COATED ORAL at 12:10

## 2023-01-22 RX ADMIN — SODIUM CHLORIDE 1 MG/HR: 0.9 INJECTION, SOLUTION INTRAVENOUS at 22:35

## 2023-01-22 RX ADMIN — PANTOPRAZOLE SODIUM 40 MG: 40 INJECTION, POWDER, FOR SOLUTION INTRAVENOUS at 12:11

## 2023-01-22 RX ADMIN — LABETALOL HYDROCHLORIDE 10 MG: 5 INJECTION, SOLUTION INTRAVENOUS at 21:48

## 2023-01-22 RX ADMIN — CHLORHEXIDINE GLUCONATE 0.12% ORAL RINSE 15 ML: 1.2 LIQUID ORAL at 12:11

## 2023-01-22 RX ADMIN — LISINOPRIL 40 MG: 20 TABLET ORAL at 12:10

## 2023-01-22 RX ADMIN — MEROPENEM 1000 MG: 1 INJECTION, POWDER, FOR SOLUTION INTRAVENOUS at 10:45

## 2023-01-22 RX ADMIN — HYDRALAZINE HYDROCHLORIDE 10 MG: 20 INJECTION, SOLUTION INTRAMUSCULAR; INTRAVENOUS at 20:39

## 2023-01-22 RX ADMIN — MEROPENEM 1000 MG: 1 INJECTION, POWDER, FOR SOLUTION INTRAVENOUS at 18:01

## 2023-01-22 RX ADMIN — AMLODIPINE BESYLATE 10 MG: 10 TABLET ORAL at 12:10

## 2023-01-22 RX ADMIN — HYDRALAZINE HYDROCHLORIDE 5 MG: 20 INJECTION, SOLUTION INTRAMUSCULAR; INTRAVENOUS at 12:12

## 2023-01-22 RX ADMIN — FENTANYL CITRATE 50 MCG: 50 INJECTION INTRAMUSCULAR; INTRAVENOUS at 17:55

## 2023-01-22 RX ADMIN — FENTANYL CITRATE 50 MCG: 50 INJECTION INTRAMUSCULAR; INTRAVENOUS at 20:43

## 2023-01-22 RX ADMIN — CHLORHEXIDINE GLUCONATE 0.12% ORAL RINSE 15 ML: 1.2 LIQUID ORAL at 20:39

## 2023-01-22 RX ADMIN — MEROPENEM 1000 MG: 1 INJECTION, POWDER, FOR SOLUTION INTRAVENOUS at 01:23

## 2023-01-22 RX ADMIN — INSULIN LISPRO 1 UNITS: 100 INJECTION, SOLUTION INTRAVENOUS; SUBCUTANEOUS at 18:01

## 2023-01-22 RX ADMIN — BUPROPION HYDROCHLORIDE 150 MG: 150 TABLET, FILM COATED, EXTENDED RELEASE ORAL at 12:10

## 2023-01-22 NOTE — ASSESSMENT & PLAN NOTE
· Initially intubated s/p cardiac arrest   · Extubated on 1/21 to 3 L via NC saturating well  Plan:   Continue to monitor oxygen saturation, wean as tolerated

## 2023-01-22 NOTE — ASSESSMENT & PLAN NOTE
Lab Results   Component Value Date    HGBA1C 9 8 (H) 10/25/2022       Recent Labs     01/21/23  1558 01/21/23  1742 01/21/23  2355 01/22/23  0632   POCGLU 140 134 119 145*       Blood Sugar Average: Last 72 hrs:  (P) 125 85     Was previously on insulin GTT  Has since been discontinued  Continue with SSI  Glycemia protocol

## 2023-01-22 NOTE — ASSESSMENT & PLAN NOTE
· The etiology is not entirely known  DDX include primary cardiac event/ arrhythmia given her history of significant vascular disease, PE, Aspiration, electrolyte abnormality, vs multifactorial 2/2 bacteremia  Less likely PE given elevated INR  · Initial EKG s/p cardiac arrest - no overt acute ST segment elevation but there is some evidence of up-sloping of her ST segment suggesting ischemia  · Repeat EKG yesterday unremarkable  Plan:  Cardiology on board, maintain amiodarone per cardiology recommendations

## 2023-01-22 NOTE — ASSESSMENT & PLAN NOTE
· POA: Septic  Source likely UTI and bacteremia with Enterobacter and Enterococcus faecalis  · Unresponsive on 1/18 and without pulse  ROSC obtained following 1mg epinephrine and defibrillation with 200 joules  Arrest may be related to ischemia in the setting of infection  · ID on board managing antibiotics  · Vascular surgery consulted and no concern for bilateral AKA stumps as source  · Blood cultures from 1/19 currently NGTD for 48 hours  Plan:  Maintain on IV antibiotics with meropenem and vancomycin per ID  Repeat blood cultures received, follow-up on results  Continue to monitor vitals, trend for any further fevers  Per cardiology, LEON set for Monday

## 2023-01-22 NOTE — PROGRESS NOTES
Cardiology         Progress Note - Cardiology   Adria Lowe 65 y o  female MRN: [id number]  Unit/Bed#: ICU 12 Encounter: [id number]          Assessment/Recommendations/Discussion:   1  Cardiac arrest secondary to electrolyte abnormalities and severe sepsis  2  Severe anemia  3  Hypertension  4  Peripheral atrial disease  5  Heparin-induced thrombocytopenia  6  History of CVA  7  Diabetes  8  Polymicrobial bacteremia with Enterobacter and Enterococcus faecalis, possible urinary source  9  Fall with facial injury    · Pt intubated, stable Hgb  Not very responsive, still lethargic  · Will tentatively plan for LEON tomorrow, but will likely be high risk  If mental status not significantly improved, would hold off on anesthesia exposure  · Eventual cath  · Tele stable, continue amiodarone drip  · Hold TF after MN, d/w ICU team                Subjective: Pt seen/examined    Very lethargic                Physical Exam:  GEN:  NAD, lethargic, facial ecchymosis  HEENT:  MMM, NCAT, pink conjunctiva, EOMI, nonicteric sclera  CV:  NO JVD/HJR, RR, NO M/R/G, +S1/S2, NO PARASTERNAL HEAVE/THRILL, NO LE EDEMA, NO HEPATIC SYSTOLIC PULSATION, WARM EXTREMITIES  RESP:  CTAB/L anteriorly  ABD:  SOFT, NT, NO GROSS ORGANOMEGALY        Vitals:   BP (!) 196/69   Pulse 90   Temp 99 4 °F (37 4 °C) (Axillary)   Resp (!) 29   Ht 5' 4" (1 626 m)   Wt 72 1 kg (159 lb)   SpO2 96%   BMI 27 29 kg/m²   Vitals:    01/18/23 1100   Weight: 72 1 kg (159 lb)       Intake/Output Summary (Last 24 hours) at 1/22/2023 1628  Last data filed at 1/22/2023 1342  Gross per 24 hour   Intake 576 58 ml   Output 1150 ml   Net -573 42 ml       TELEMETRY: SR  Lab Results:  Results from last 7 days   Lab Units 01/22/23  0624   WBC Thousand/uL 20 62*   HEMOGLOBIN g/dL 7 5*   HEMATOCRIT % 21 9*   PLATELETS Thousands/uL 289     Results from last 7 days   Lab Units 01/22/23  0624   POTASSIUM mmol/L 3 6   CHLORIDE mmol/L 110*   CO2 mmol/L 24   BUN mg/dL 15 CREATININE mg/dL 0 51*   CALCIUM mg/dL 7 2*   ALK PHOS U/L 199*   ALT U/L 19   AST U/L 16     Results from last 7 days   Lab Units 23  0624   POTASSIUM mmol/L 3 6   CHLORIDE mmol/L 110*   CO2 mmol/L 24   BUN mg/dL 15   CREATININE mg/dL 0 51*   CALCIUM mg/dL 7 2*           Medications:    Current Facility-Administered Medications:   •  acetaminophen (TYLENOL) rectal suppository 650 mg, 650 mg, Rectal, Q6H PRN, Uriah Garner MD, 650 mg at 23 1900  •  [] amiodarone (CORDARONE) 900 mg in dextrose 5 % 500 mL infusion, 1 mg/min, Intravenous, Continuous, Stopped at 23 1643 **FOLLOWED BY** amiodarone (CORDARONE) 900 mg in dextrose 5 % 500 mL infusion, 0 5 mg/min, Intravenous, Continuous, ALEX Ang, Last Rate: 16 7 mL/hr at 23 1647, 0 5 mg/min at 23 1647  •  amLODIPine (NORVASC) tablet 10 mg, 10 mg, Oral, Daily, ALEX Ang, 10 mg at 23 1210  •  bisacodyl (DULCOLAX) rectal suppository 10 mg, 10 mg, Rectal, Daily PRN, ALEX Correia  •  buPROPion (WELLBUTRIN XL) 24 hr tablet 150 mg, 150 mg, Oral, Daily, Wan Alcantara DO, 150 mg at 23 1210  •  chlorhexidine (PERIDEX) 0 12 % oral rinse 15 mL, 15 mL, Mouth/Throat, Q12H [facility], Jane Barragan PA-C, 15 mL at 23 1211  •  clopidogrel (PLAVIX) tablet 75 mg, 75 mg, Oral, Daily, Wan Alcantara DO, 75 mg at 23 1210  •  clotrimazole-betamethasone (LOTRISONE) 1-0 05 % cream, , Topical, BID, Wan Alcantara DO, Given at 23 1846  •  dexmedeTOMIDine (Precedex) 400 mcg in sodium chloride 0 9% 100 mL, 0 1-0 7 mcg/kg/hr, Intravenous, Titrated, ALEX Correia, Stopped at 23 1030  •  fentanyl citrate (PF) 100 MCG/2ML 50 mcg, 50 mcg, Intravenous, Q2H PRN, Cecile Santos MD, 50 mcg at 23 2303  •  fluocinonide (LIDEX) 0 05 % ointment, , Topical, BID, Wan Alcantara DO, Given at 23 1846  •  hydrALAZINE (APRESOLINE) injection 5 mg, 5 mg, Intravenous, J7K PRN, ALEX Nguyen, 5 mg at 01/22/23 1212  •  insulin lispro (HumaLOG) 100 units/mL subcutaneous injection 1-5 Units, 1-5 Units, Subcutaneous, Q6H [facility], 1 Units at 01/22/23 1300 **AND** Fingerstick Glucose (POCT), , , Q6H, Sepideh Mar PA-C  •  lisinopril (ZESTRIL) tablet 40 mg, 40 mg, Oral, Daily, [facility], CRNP, 40 mg at 01/22/23 1210  •  meropenem (MERREM) 1,000 mg in sodium chloride 0 9 % 100 mL IVPB, 1,000 mg, Intravenous, Q8H, Wan Alcantara DO, Last Rate: 100 mL/hr at 01/22/23 1045, 1,000 mg at 01/22/23 1045  •  metoprolol tartrate (LOPRESSOR) tablet 25 mg, 25 mg, Oral, Q12H [facility], [facility], 25 mg at 01/22/23 1210  •  pantoprazole (PROTONIX) injection 40 mg, 40 mg, Intravenous, Q24H [facility], [facility], 40 mg at 01/22/23 1211  •  perflutren lipid microsphere (DEFINITY) injection, 0 4 mL/min, Intravenous, Once in imaging, [place] BEN GarciaNP  •  polyethylene glycol (MIRALAX) packet 17 g, 17 g, Oral, Daily, ALEX Nguyen, 17 g at 01/21/23 9440  •  senna (SENOKOT) tablet 8 6 mg, 1 tablet, Oral, HS, ALEX Nguyen, 8 6 mg at 01/20/23 2200  •  sertraline (ZOLOFT) tablet 100 mg, 100 mg, Oral, Daily, Wan Alcantara DO, 100 mg at 01/22/23 1210  •  vancomycin (VANCOCIN) 1,250 mg in sodium chloride 0 9 % 250 mL IVPB, 1,250 mg, Intravenous, Q24H, Meera Mistry MD    This note was completed in part utilizing M-Modal Fluency Direct Software  Grammatical errors, random word insertions, spelling mistakes, and incomplete sentences may be an occasional consequence of this system secondary to software limitations, ambient noise, and hardware issues  If you have any questions or concerns about the content, text, or information contained within the body of this dictation, please contact the provider for clarification

## 2023-01-22 NOTE — ASSESSMENT & PLAN NOTE
History of HIT  Platelets remain stable  On AC at home w/ warfarin, with Goal INR 2-3  Currently holding warfarin 2/2 supratherapeutic INR  Plan: Continue with Plavix  Monitor INR

## 2023-01-22 NOTE — PROGRESS NOTES
Betzy Mari is a 65 y o  female who is currently ordered Vancomycin IV with management by the Pharmacy Consult service  Relevant clinical data and objective / subjective history reviewed  Vancomycin Assessment:  Indication and Goal AUC/Trough: Other, sepsis, -600, trough 15-20  Clinical Status: stable  Micro:     Renal Function:  SCr: 0 51 mg/dL  CrCl: 112 8 mL/min  Renal replacement: Not on dialysis  Days of Therapy: 6  Current Dose: 1000 mg IV every 24 hours   Vancomycin Plan:  New Dosin mg Iv every 24 hours   Estimated AUC: 414 mcg*hr/mL  Estimated Trough: 10 9 mcg/mL  Next Level:  @ 0600  Renal Function Monitoring: Daily BMP and [place] will continue to follow closely for s/sx of nephrotoxicity, infusion reactions and appropriateness of therapy  BMP and CBC will be ordered per protocol  We will continue to follow the patient’s culture results and clinical progress daily      Ivis Monroy, Pharmacist

## 2023-01-22 NOTE — ASSESSMENT & PLAN NOTE
Magnesium decreased at 1 7  Will replete 2 g  Plan: Maintain magnesium greater than 2 per cardiology recommendations

## 2023-01-22 NOTE — PROGRESS NOTES
Progress Note - Infectious Disease   Clemon Dance 65 y o  female MRN: [id number]  Unit/Bed#: ICU 12 Encounter: [id number]      Impression/Plan:    1   Sepsis, POA   Patient met sepsis criteria on presentation   Sources are 2 and 3  Course complicated by 4   Extensive resistance developing on prior cultures   2D echo technically difficult study   Extensive CT imaging without any focal findings otherwise, although repeat CT A/P without contrast  The patient is not requiring pressors, fevers improving but worsening leukocytosis today  -Antibiotics as below  -Continue to trend fever curve/vitals  -Repeat CBC and CMP tomorrow  -Follow-up pending blood cultures  -Additional supportive care as per primary  -Additional interventions pending clinical course     2   Polymicrobial bacteremia   Blood cultures have isolated Enterobacter and Enterococcus faecalis  Enterobacter resistant to Ertapenem but no carbapenemase present on confirmatory testing   Consider urinary source, no other ectopic focus of infection on extensive imaging   2D echo limited   Consider also endovascular source given 4   Other potential sources are the patient's wound on her right AKA site although this does not appear acutely infected and wound has granulated over the [facility] therapy; there is ecchymosis present and foci of air on imaging which vascular feels is due to injury   Patient with tenderness over the AKA site   No other intravascular devices appreciated on  imaging   The patient does have an IUD in place, unclear when it was placed but no clinical or radiography signs of uterine infection  Continue meropenem  Continue vancomycin  Continue to trend fever curve/vitals  Repeat CBC and CMP tomorrow  If WBC continues to increase, recommend repeat CT of the right stump and A/P with contrast  Follow-up repeat blood cultures  Recommend formal transesophageal echo, when possible  Anticipate at minimum 2 weeks of IV antibiotic for this issue  Additional interventions pending clinical course     3   Complicated urinary tract infection   Patient unable to provide any history at this time  Ray Reinoso was grossly abnormal on presentation   Possible source of the above   Urine cultures reviewed  Continue antibiotic as above  Monitor abdominal exam  Catheters as per primary     4   Cardiac arrest   Suspect that this may have been unrelated to the above   Patient with multiple other comorbidities as well   Ongoing care and work-up as per critical care/cardiology  Patient extubated 2023     5   Transaminitis   Acute elevation noted likely in the setting of 4   We will need to monitor LFTs however on meropenem   Improving  Antibiotic as above  Repeat LFTs tomorrow  Monitor abdominal exam     Above plan discussed in detail with critical care team  ID will follow  Antibiotics:  Meropenem/vancomycin 5  Total antibiotic 6    Subjective:  Patient remains lethargic, minimally interactive  She does moan with palpation of the right AKA site  She has no abdominal tenderness  No fever today  Objective:  Vitals:  Temp:  [98 9 °F (37 2 °C)-100 8 °F (38 2 °C)] 99 4 °F (37 4 °C)  HR:  [50-94] 90  Resp:  [0-35] 29  BP: (110-196)/(51-87) 196/69  SpO2:  [96 %-100 %] 96 %  Temp (24hrs), Av °F (37 8 °C), Min:98 9 °F (37 2 °C), Max:100 8 °F (38 2 °C)  Current: Temperature: 99 4 °F (37 4 °C)    Physical Exam:   General Appearance:   Chronically ill-appearing debilitated, but nontoxic   Throat: Oropharynx moist without lesions  Lungs:    Decreased breath sounds bilaterally   Heart:  RRR; no murmur, rub or gallop   Abdomen/   Soft, non-tender, non-distended, positive bowel sounds  Jacinto in place   Extremities: Left AKA stump no abnormalities  Right AKA wound appears clean without evidence of purulence or cellulitis  Ecchymosis was surrounding wound site   Skin: No new rashes or lesions  Labs:    All pertinent labs and imaging studies were personally reviewed  Results from last 7 days   Lab Units 01/22/23  0624 01/21/23  0539 01/20/23  2135 01/20/23  1501 01/20/23  0544   WBC Thousand/uL 20 62* 15 66*  --   --  13 89*   HEMOGLOBIN g/dL 7 5* 7 2* 7 7*   < > 5 4*   PLATELETS Thousands/uL 289 239  --   --  248    < > = values in this interval not displayed  Results from last 7 days   Lab Units 01/22/23  0624 01/21/23  0539 01/20/23  1501 01/20/23  0544   SODIUM mmol/L 141 136 135 135   POTASSIUM mmol/L 3 6 3 3* 3 1* 2 9*   CHLORIDE mmol/L 110* 105 103 101   CO2 mmol/L 24 20* 23 22   BUN mg/dL 15 22 24 24   CREATININE mg/dL 0 51* 0 79 0 84 0 84   EGFR ml/min/1 73sq m 104 81 75 75   CALCIUM mg/dL 7 2* 7 4* 7 3* 7 3*   AST U/L 16 29  --  39   ALT U/L 19 28  --  40   ALK PHOS U/L 199* 205*  --  170*     Results from last 7 days   Lab Units 01/17/23  2116 01/17/23  1605   PROCALCITONIN ng/ml 1 37* 0 27*                   Micro:  Results from last 7 days   Lab Units 01/21/23  0538 01/19/23  1226 01/19/23  0019 01/17/23  1641 01/17/23  1606 01/17/23  1553   BLOOD CULTURE  No Growth at 24 hrs  No Growth at 24 hrs  No Growth at 48 hrs    No Growth at 48 hrs   --   --  Enterobacter cloacae*  Enterococcus faecalis* Enterococcus faecalis*   GRAM STAIN RESULT   --   --   --   --  Gram negative rods*  Gram positive cocci in pairs and chains* Gram positive cocci in pairs and chains*   URINE CULTURE   --   --   --  >100,000 cfu/ml Enterococcus faecalis*  <10,000 cfu/ml Citrobacter freundii*  <10,000 cfu/ml Klebsiella variicola*  --   --    MRSA CULTURE ONLY   --   --  No Methicillin Resistant Staphlyococcus aureus (MRSA) isolated  --   --   --

## 2023-01-22 NOTE — ASSESSMENT & PLAN NOTE
· Type II non-MI likely 2/2 demand  · Cardiology following  No immediate ischemic w/u needed at this time   Will need after acute illness  · ECHO 1/18- EF 50-55%

## 2023-01-22 NOTE — PROGRESS NOTES
[facility] [facility]  Progress Note - [address] 1960, 65 y o  female MRN: [id number]  Unit/Bed#: ICU 12 Encounter: [id number]  Primary Care Provider: ALEX Hamilton   Date and time admitted to hospital: 1/17/2023  3:48 PM    * Sepsis due to urinary tract infection ([address] )  Assessment & Plan  · POA: Septic  Source likely UTI and bacteremia with Enterobacter and Enterococcus faecalis  · Unresponsive on 1/18 and without pulse  ROSC obtained following 1mg epinephrine and defibrillation with 200 joules  Arrest may be related to ischemia in the setting of infection  · ID on board managing antibiotics  · Vascular surgery consulted and no concern for bilateral AKA stumps as source  · Blood cultures from 1/19 currently NGTD for 48 hours  Plan:  Maintain on IV antibiotics with meropenem and vancomycin per ID  Repeat blood cultures received, follow-up on results  Continue to monitor vitals, trend for any further fevers  Per cardiology, LEON set for Monday  Cardiac arrest [facility])  Assessment & Plan  · The etiology is not entirely known  DDX include primary cardiac event/ arrhythmia given her history of significant vascular disease, PE, Aspiration, electrolyte abnormality, vs multifactorial 2/2 bacteremia  Less likely PE given elevated INR  · Initial EKG s/p cardiac arrest - no overt acute ST segment elevation but there is some evidence of up-sloping of her ST segment suggesting ischemia  · Repeat EKG yesterday unremarkable  Plan:  Cardiology on board, maintain amiodarone per cardiology recommendations  Acute respiratory failure with hypoxia (HCC)  Assessment & Plan  · Initially intubated s/p cardiac arrest   · Extubated on 1/21 to 3 L via NC saturating well  Plan:   Continue to monitor oxygen saturation, wean as tolerated  HIT (heparin-induced thrombocytopenia)  Assessment & Plan  History of HIT  Platelets remain stable    On AC at home w/ warfarin, with Goal INR 2-3   Currently holding warfarin 2/2 supratherapeutic INR  Plan: Continue with Plavix  Monitor INR  Hypomagnesemia  Assessment & Plan  Magnesium decreased at 1 7  Will replete 2 g  Plan: Maintain magnesium greater than 2 per cardiology recommendations  Troponin level elevated  Assessment & Plan  · Type II non-MI likely 2/2 demand  · Cardiology following  No immediate ischemic w/u needed at this time  Will need after acute illness  · ECHO 1/18- EF 50-55%    PAD (peripheral artery disease) ([facility])  Assessment & Plan  Has BL AKA  Vascular Surgery on board  Plan: Continue w/ home plavix  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 [facility])  Assessment & Plan  Lab Results   Component Value Date    HGBA1C 9 8 (H) 10/25/2022       Recent Labs     01/21/23  1558 01/21/23  1742 01/21/23  2355 01/22/23  0632   POCGLU 140 134 119 145*       Blood Sugar Average: Last 72 hrs:  (P) 125 85     Was previously on insulin GTT  Has since been discontinued  Continue with SSI  Glycemia protocol     ----------------------------------------------------------------------------------------  HPI/24hr events:   · Received soapsuds enema with 1 small BM  · Placed on Precedex due to persistently elevated BP and moaning  Has been calm since  · Received hydralazine as needed due to elevated Bps  Patient appropriate for transfer out of the ICU today?: No  Disposition: Continue Critical Care   Code Status: Level 3 - DNAR and DNI  ---------------------------------------------------------------------------------------  SUBJECTIVE  Patient appears to be in a deep sleep, despite her eyes being slightly open  Per my exam is pale but resting comfortably in bed  Does not appear to be in distress      Review of Systems   Unable to perform ROS: Acuity of condition ---------------------------------------------------------------------------------------  OBJECTIVE    Vitals   Vitals:    23 0530 23 0545 23 0600 23 0615   BP:       Pulse: 86 90 88 88   Resp: (!) 27 (!) 27 (!) 28 (!) 27   Temp:   99 8 °F (37 7 °C)    TempSrc:   Bladder    SpO2: 99% 98% 99% 99%   Weight:       Height:         Temp (24hrs), Av 5 °F (37 5 °C), Min:98 6 °F (37 °C), Max:100 8 °F (38 2 °C)  Current: Temperature: 99 8 °F (37 7 °C)  Arterial Line BP: 138/50  Arterial Line MAP (mmHg): 80 mmHg    Respiratory:  SpO2 Device: O2 Device: Nasal cannula  Nasal Cannula O2 Flow Rate (L/min): 3 L/min    Invasive/non-invasive ventilation settings   Respiratory    Lab Data (Last 4 hours)    None         O2/Vent Data (Last 4 hours)    None                Physical Exam  Vitals reviewed  Constitutional:       Appearance: She is ill-appearing and diaphoretic  Comments: Asleep   HENT:      Head:      Comments: Stitched laceration on forehead  Bruising under left eye  Eyes:      Conjunctiva/sclera: Conjunctivae normal    Cardiovascular:      Rate and Rhythm: Normal rate and regular rhythm  Heart sounds: Normal heart sounds  No murmur heard  Pulmonary:      Effort: Pulmonary effort is normal  No respiratory distress  Comments: On 3 L via NC  Decreased breath sounds appreciated bilaterally  Abdominal:      Palpations: Abdomen is soft  Comments: Hypoactive bowel sounds   Musculoskeletal:      Comments: Bilateral AKA   Skin:     Coloration: Skin is pale             Laboratory and Diagnostics:  Results from last 7 days   Lab Units 23  0624 23  0539 23  2135 23  1501 23  0544 23  0448 23  1935 23  0832 23  0530 23  1605   WBC Thousand/uL 20 62* 15 66*  --   --  13 89* 17 24* 14 53* 26 92* 16 42* 16 48*   HEMOGLOBIN g/dL 7 5* 7 2* 7 7* 6 4* 5 4* 7 2* 7 7* 8 9* 9 6* 9 7*   HEMATOCRIT % 21 9* 21 3* 22 9* 19 1* 17 5* 22 4* 24 8* 29 0* 30 5* 31 2*   PLATELETS Thousands/uL 289 239  --   --  248 232 253 332 267 269   NEUTROS PCT %  --  69  --   --   --  81* 83*  --  89* 88*   MONOS PCT %  --  7  --   --   --  11 10  --  5 6   MONO PCT %  --   --   --   --   --   --   --  8  --   --      Results from last 7 days   Lab Units 01/22/23  0624 01/21/23  0539 01/20/23  1501 01/20/23  0544 01/19/23  0448 01/18/23  1935 01/18/23  0832 01/18/23  0530   SODIUM mmol/L 141 136 135 135 136 137 132* 133*   POTASSIUM mmol/L 3 6 3 3* 3 1* 2 9* 3 8 3 3* 3 0* 2 9*   CHLORIDE mmol/L 110* 105 103 101 103 105 97 96   CO2 mmol/L 24 20* 23 22 21 23 16* 21   ANION GAP mmol/L 7 11 9 12 12 9 19* 16*   BUN mg/dL 15 22 24 24 23 22 15 13   CREATININE mg/dL 0 51* 0 79 0 84 0 84 0 91 0 97 0 82 0 68   CALCIUM mg/dL 7 2* 7 4* 7 3* 7 3* 7 4* 7 5* 7 1* 7 9*   GLUCOSE RANDOM mg/dL 144* 84 104 112 112 151* 351* 210*   ALT U/L 19 28  --  40 51 71* 101* 10   AST U/L 16 29  --  39 47* 89* 174* 20   ALK PHOS U/L 199* 205*  --  170* 121* 134* 132* 140*   ALBUMIN g/dL 2 3* 2 5*  --  2 3* 2 5* 2 6* 2 7* 3 1*   TOTAL BILIRUBIN mg/dL 0 47 0 51  --  0 38 0 36 0 37 0 49 0 52     Results from last 7 days   Lab Units 01/22/23  0624 01/21/23  0539 01/20/23  0544 01/19/23  1242 01/18/23  0832 01/17/23  1605   MAGNESIUM mg/dL 1 7* 2 1 2 1 2 4 1 8* 1 5*   PHOSPHORUS mg/dL 2 6  --  3 9 3 7 5 2*  --       Results from last 7 days   Lab Units 01/20/23  0905 01/18/23  0530 01/17/23  1605   INR  2 97* 4 29* 2 96*   PTT seconds  --   --  57*          Results from last 7 days   Lab Units 01/18/23  1050 01/18/23  0834 01/17/23  1605   LACTIC ACID mmol/L 1 5 6 7* 1 1     ABG:  Results from last 7 days   Lab Units 01/18/23  1036   PH ART  7 421   PCO2 ART mm Hg 29 7*   PO2 ART mm Hg 223 0*   HCO3 ART mmol/L 18 9*   BASE EXC ART mmol/L -4 4   ABG SOURCE  Line, Arterial     VBG:  Results from last 7 days   Lab Units 01/18/23  1036 01/17/23  1605   PH JORDEN   --  7 488*   PCO2 JORDEN mm Hg  --  29 3* PO2 JORDEN mm Hg  --  54 0*   HCO3 JORDEN mmol/L  --  21 7*   BASE EXC JORDEN mmol/L  --  -0 9   ABG SOURCE  Line, Arterial  --      Results from last 7 days   Lab Units 01/17/23  2116 01/17/23  1605   PROCALCITONIN ng/ml 1 37* 0 27*       Micro  Results from last 7 days   Lab Units 01/21/23  0538 01/19/23  1226 01/19/23  0019 01/17/23  1641 01/17/23  1606 01/17/23  1553   BLOOD CULTURE  Received in Microbiology Lab  Culture in Progress  Received in Microbiology Lab  Culture in Progress  No Growth at 48 hrs  No Growth at 48 hrs   --   --  Enterobacter cloacae*  Enterococcus faecalis* Enterococcus faecalis*   GRAM STAIN RESULT   --   --   --   --  Gram negative rods*  Gram positive cocci in pairs and chains* Gram positive cocci in pairs and chains*   URINE CULTURE   --   --   --  >100,000 cfu/ml Enterococcus faecalis*  <10,000 cfu/ml Citrobacter freundii*  <10,000 cfu/ml Klebsiella variicola*  --   --    MRSA CULTURE ONLY   --   --  No Methicillin Resistant Staphlyococcus aureus (MRSA) isolated  --   --   --        EKG: On telemetry  Imaging: I have personally reviewed pertinent reports  Intake and Output  I/O       01/20 0701 01/21 0700 01/21 0701 01/22 0700 01/22 0701 01/23 0700    I V  (mL/kg) 1122 (15 6) 672 (9 3)     Blood 648 3      NG/ 100     IV Piggyback 550      Feedings 95 121     Total Intake(mL/kg) 2665 3 (37) 893 (12 4)     Urine (mL/kg/hr) 570 (0 3) 800 (0 5)     Emesis/NG output       Stool 0      Total Output 570 800     Net +2095 3 +93            Unmeasured Stool Occurrence 1 x            Height and Weights   Height: 5' 4" (162 6 cm)  IBW (Ideal Body Weight): 54 7 kg  Body mass index is 27 29 kg/m²  Weight (last 2 days)     None            Nutrition       Diet Orders   (From admission, onward)             Start     Ordered    01/20/23 1819  Diet Enteral/Parenteral; Tube Feeding No Oral Diet; Jevity 1 2 Alireza; Continuous; 20; 100;  Water; Every 6 hours  Diet effective now References:    Nutrtion Support Algorithm Enteral vs  Parenteral   Question Answer Comment   Diet Type Enteral/Parenteral    Enteral/Parenteral Tube Feeding No Oral Diet    Tube Feeding Formula: Jevity 1 2 Alireza    Bolus/Cyclic/Continuous Continuous    Tube Feeding Goal Rate (mL/hr): 20    Tube Feeding flush (mL): 100    Water Flush type: Water    Water flush frequency: Every 6 hours    RD to adjust diet per protocol?  Yes        01/20/23 1820                  Active Medications  Scheduled Meds:  Current Facility-Administered Medications   Medication Dose Route Frequency Provider Last Rate   • acetaminophen  650 mg Rectal Q6H PRN Kylie Bhatti MD     • amiodarone  0 5 mg/min Intravenous Continuous ALEX Lee 0 5 mg/min (01/20/23 1647)   • amLODIPine  10 mg Oral Daily ALEX Lee     • bisacodyl  10 mg Rectal Daily PRN ALEX Luna     • buPROPion  150 mg Oral Daily Wan Alcantara DO     • chlorhexidine  15 mL Mouth/Throat Q12H [address] Leisa Rivera PA-C     • clopidogrel  75 mg Oral Daily Wan Alcantara DO     • clotrimazole-betamethasone   Topical BID Wan Alcantara DO     • dexmedetomidine  0 1-0 7 mcg/kg/hr Intravenous Titrated ALEX Luna 0 4 mcg/kg/hr (01/22/23 0431)   • fentanyl citrate (PF)  50 mcg Intravenous Q2H PRN Nancy Matson MD     • fluocinonide   Topical BID Car Alcantara DO     • hydrALAZINE  5 mg Intravenous G6T PRN ALEX Luna     • insulin lispro  1-5 Units Subcutaneous Q6H [address] Leisa Rivera PA-C     • lisinopril  40 mg Oral Daily ALEX Lee     • magnesium sulfate  2 g Intravenous Once Fern Quevedo DO     • meropenem  1,000 mg Intravenous Q8H Wan Alcantara DO 1,000 mg (01/22/23 0123)   • metoprolol tartrate  25 mg Oral Q12H [address] ALEX Lee     • pantoprazole  40 mg Intravenous Q24H [address] ALEX Portillo     • perflutren lipid microsphere  0 4 mL/min Intravenous Once in imaging ALEX De Leon     • polyethylene glycol  17 g Oral Daily ALEX Hawthorne     • potassium chloride  40 mEq Oral TID Kalynaby Baptiste MD     • senna  1 tablet Oral HS ALEX Hawthorne     • sertraline  100 mg Oral Daily Wan Alcantara DO     • vancomycin  1,000 mg Intravenous Q24H Shaan Morales MD 1,000 mg (01/21/23 2003)     Continuous Infusions:  amiodarone, 0 5 mg/min, Last Rate: 0 5 mg/min (01/20/23 1647)  dexmedetomidine, 0 1-0 7 mcg/kg/hr, Last Rate: 0 4 mcg/kg/hr (01/22/23 5261)      PRN Meds:   acetaminophen, 650 mg, Q6H PRN  bisacodyl, 10 mg, Daily PRN  fentanyl citrate (PF), 50 mcg, Q2H PRN  hydrALAZINE, 5 mg, Q6H PRN  perflutren lipid microsphere, 0 4 mL/min, Once in imaging        Invasive Devices Review  Invasive Devices     Peripheral Intravenous Line  Duration           Peripheral IV 01/20/23 Left Forearm 1 day    Peripheral IV 01/20/23 Upper;Medial Arm 1 day          Arterial Line  Duration           Arterial Line 01/18/23 Radial 3 days          Drain  Duration           Urethral Catheter Temperature probe 16 Fr  4 days    NG/OG/Enteral Tube Orogastric Left mouth 3 days                Rationale for remaining devices: Medical necessity  ---------------------------------------------------------------------------------------  Advance Directive and Living Will:      Power of : Yes  POLST:    ---------------------------------------------------------------------------------------  Care Time Delivered:   Deferred to attending      Madeleine Adkins DO      Portions of the record may have been created with voice recognition software  Occasional wrong word or "sound a like" substitutions may have occurred due to the inherent limitations of voice recognition software    Read the chart carefully and recognize, using context, where substitutions have occurred

## 2023-01-23 ENCOUNTER — APPOINTMENT (INPATIENT)
Dept: CT IMAGING | Facility: HOSPITAL | Age: 63
End: 2023-01-23

## 2023-01-23 PROBLEM — E87.1 HYPONATREMIA: Status: RESOLVED | Noted: 2023-01-17 | Resolved: 2023-01-23

## 2023-01-23 PROBLEM — J96.01 ACUTE RESPIRATORY FAILURE WITH HYPOXIA (HCC): Status: RESOLVED | Noted: 2018-02-24 | Resolved: 2023-01-23

## 2023-01-23 PROBLEM — S01.81XA FOREHEAD LACERATION: Status: ACTIVE | Noted: 2023-01-23

## 2023-01-23 LAB
ANION GAP SERPL CALCULATED.3IONS-SCNC: 9 MMOL/L (ref 4–13)
ATRIAL RATE: 84 BPM
BUN SERPL-MCNC: 12 MG/DL (ref 5–25)
CA-I BLD-SCNC: 1.04 MMOL/L (ref 1.12–1.32)
CALCIUM SERPL-MCNC: 7.5 MG/DL (ref 8.4–10.2)
CHLORIDE SERPL-SCNC: 109 MMOL/L (ref 96–108)
CO2 SERPL-SCNC: 23 MMOL/L (ref 21–32)
CREAT SERPL-MCNC: 0.46 MG/DL (ref 0.6–1.3)
ERYTHROCYTE [DISTWIDTH] IN BLOOD BY AUTOMATED COUNT: 16.2 % (ref 11.6–15.1)
GFR SERPL CREATININE-BSD FRML MDRD: 107 ML/MIN/1.73SQ M
GLUCOSE SERPL-MCNC: 172 MG/DL (ref 65–140)
GLUCOSE SERPL-MCNC: 203 MG/DL (ref 65–140)
GLUCOSE SERPL-MCNC: 227 MG/DL (ref 65–140)
GLUCOSE SERPL-MCNC: 264 MG/DL (ref 65–140)
GLUCOSE SERPL-MCNC: 284 MG/DL (ref 65–140)
HCT VFR BLD AUTO: 26.1 % (ref 34.8–46.1)
HGB BLD-MCNC: 8.5 G/DL (ref 11.5–15.4)
INR PPP: 1.22 (ref 0.84–1.19)
MAGNESIUM SERPL-MCNC: 1.8 MG/DL (ref 1.9–2.7)
MCH RBC QN AUTO: 27.7 PG (ref 26.8–34.3)
MCHC RBC AUTO-ENTMCNC: 32.6 G/DL (ref 31.4–37.4)
MCV RBC AUTO: 85 FL (ref 82–98)
P AXIS: 43 DEGREES
PHOSPHATE SERPL-MCNC: 1.7 MG/DL (ref 2.3–4.1)
PLATELET # BLD AUTO: 449 THOUSANDS/UL (ref 149–390)
PMV BLD AUTO: 8.5 FL (ref 8.9–12.7)
POTASSIUM SERPL-SCNC: 2.8 MMOL/L (ref 3.5–5.3)
PR INTERVAL: 142 MS
PROTHROMBIN TIME: 15.4 SECONDS (ref 11.6–14.5)
QRS AXIS: 0 DEGREES
QRSD INTERVAL: 83 MS
QT INTERVAL: 404 MS
QTC INTERVAL: 478 MS
RBC # BLD AUTO: 3.07 MILLION/UL (ref 3.81–5.12)
SODIUM SERPL-SCNC: 141 MMOL/L (ref 135–147)
T WAVE AXIS: 87 DEGREES
TSH SERPL DL<=0.05 MIU/L-ACNC: 1.37 UIU/ML (ref 0.45–4.5)
VENTRICULAR RATE: 84 BPM
WBC # BLD AUTO: 29.57 THOUSAND/UL (ref 4.31–10.16)

## 2023-01-23 RX ORDER — CALCIUM GLUCONATE 20 MG/ML
2 INJECTION, SOLUTION INTRAVENOUS ONCE
Status: COMPLETED | OUTPATIENT
Start: 2023-01-23 | End: 2023-01-23

## 2023-01-23 RX ORDER — HYDRALAZINE HYDROCHLORIDE 20 MG/ML
10 INJECTION INTRAMUSCULAR; INTRAVENOUS ONCE
Status: COMPLETED | OUTPATIENT
Start: 2023-01-23 | End: 2023-01-23

## 2023-01-23 RX ORDER — POTASSIUM CHLORIDE 20MEQ/15ML
40 LIQUID (ML) ORAL ONCE
Status: DISCONTINUED | OUTPATIENT
Start: 2023-01-23 | End: 2023-01-23

## 2023-01-23 RX ORDER — DEXMEDETOMIDINE HYDROCHLORIDE 4 UG/ML
.1-.7 INJECTION, SOLUTION INTRAVENOUS
Status: DISCONTINUED | OUTPATIENT
Start: 2023-01-23 | End: 2023-01-24

## 2023-01-23 RX ORDER — HYDRALAZINE HYDROCHLORIDE 20 MG/ML
10 INJECTION INTRAMUSCULAR; INTRAVENOUS EVERY 4 HOURS PRN
Status: DISCONTINUED | OUTPATIENT
Start: 2023-01-23 | End: 2023-01-24

## 2023-01-23 RX ORDER — POTASSIUM CHLORIDE 20MEQ/15ML
40 LIQUID (ML) ORAL ONCE
Status: COMPLETED | OUTPATIENT
Start: 2023-01-23 | End: 2023-01-23

## 2023-01-23 RX ORDER — AMIODARONE HYDROCHLORIDE 200 MG/1
400 TABLET ORAL 2 TIMES DAILY WITH MEALS
Status: DISCONTINUED | OUTPATIENT
Start: 2023-01-23 | End: 2023-02-03 | Stop reason: HOSPADM

## 2023-01-23 RX ORDER — ACETAMINOPHEN 160 MG/5ML
SUSPENSION, ORAL (FINAL DOSE FORM) ORAL
Status: COMPLETED
Start: 2023-01-23 | End: 2023-01-23

## 2023-01-23 RX ORDER — BUPROPION HYDROCHLORIDE 75 MG/1
75 TABLET ORAL 2 TIMES DAILY
Status: DISCONTINUED | OUTPATIENT
Start: 2023-01-24 | End: 2023-02-02

## 2023-01-23 RX ORDER — MAGNESIUM SULFATE HEPTAHYDRATE 40 MG/ML
2 INJECTION, SOLUTION INTRAVENOUS ONCE
Status: COMPLETED | OUTPATIENT
Start: 2023-01-23 | End: 2023-01-23

## 2023-01-23 RX ORDER — ACETAMINOPHEN 650 MG/20.3ML
650 SUSPENSION ORAL EVERY 4 HOURS PRN
Status: DISCONTINUED | OUTPATIENT
Start: 2023-01-23 | End: 2023-01-28

## 2023-01-23 RX ORDER — POTASSIUM CHLORIDE 20MEQ/15ML
40 LIQUID (ML) ORAL 2 TIMES DAILY
Status: DISCONTINUED | OUTPATIENT
Start: 2023-01-23 | End: 2023-01-23

## 2023-01-23 RX ORDER — POTASSIUM CHLORIDE 14.9 MG/ML
20 INJECTION INTRAVENOUS ONCE
Status: COMPLETED | OUTPATIENT
Start: 2023-01-23 | End: 2023-01-23

## 2023-01-23 RX ORDER — HYDRALAZINE HYDROCHLORIDE 10 MG/1
10 TABLET, FILM COATED ORAL EVERY 8 HOURS SCHEDULED
Status: DISCONTINUED | OUTPATIENT
Start: 2023-01-23 | End: 2023-01-24

## 2023-01-23 RX ORDER — WARFARIN SODIUM 2 MG/1
4 TABLET ORAL
Status: COMPLETED | OUTPATIENT
Start: 2023-01-23 | End: 2023-01-23

## 2023-01-23 RX ADMIN — LISINOPRIL 40 MG: 20 TABLET ORAL at 08:37

## 2023-01-23 RX ADMIN — SENNOSIDES 8.6 MG: 8.6 TABLET ORAL at 23:45

## 2023-01-23 RX ADMIN — POLYETHYLENE GLYCOL 3350 17 G: 17 POWDER, FOR SOLUTION ORAL at 08:37

## 2023-01-23 RX ADMIN — POTASSIUM CHLORIDE 20 MEQ: 14.9 INJECTION, SOLUTION INTRAVENOUS at 06:15

## 2023-01-23 RX ADMIN — SERTRALINE HYDROCHLORIDE 100 MG: 100 TABLET, FILM COATED ORAL at 08:37

## 2023-01-23 RX ADMIN — AMPICILLIN SODIUM 2000 MG: 2 INJECTION, POWDER, FOR SOLUTION INTRAMUSCULAR; INTRAVENOUS at 11:55

## 2023-01-23 RX ADMIN — AMIODARONE HYDROCHLORIDE 0.5 MG/MIN: 50 INJECTION, SOLUTION INTRAVENOUS at 09:03

## 2023-01-23 RX ADMIN — AMLODIPINE BESYLATE 10 MG: 10 TABLET ORAL at 08:37

## 2023-01-23 RX ADMIN — POTASSIUM CHLORIDE 20 MEQ: 14.9 INJECTION, SOLUTION INTRAVENOUS at 06:02

## 2023-01-23 RX ADMIN — MEROPENEM 1000 MG: 1 INJECTION, POWDER, FOR SOLUTION INTRAVENOUS at 01:32

## 2023-01-23 RX ADMIN — HYDRALAZINE HYDROCHLORIDE 10 MG: 10 TABLET, FILM COATED ORAL at 23:46

## 2023-01-23 RX ADMIN — CHLORHEXIDINE GLUCONATE 0.12% ORAL RINSE 15 ML: 1.2 LIQUID ORAL at 08:37

## 2023-01-23 RX ADMIN — AMIODARONE HYDROCHLORIDE 400 MG: 200 TABLET ORAL at 15:44

## 2023-01-23 RX ADMIN — INSULIN LISPRO 2 UNITS: 100 INJECTION, SOLUTION INTRAVENOUS; SUBCUTANEOUS at 19:38

## 2023-01-23 RX ADMIN — METOPROLOL TARTRATE 25 MG: 25 TABLET, FILM COATED ORAL at 20:27

## 2023-01-23 RX ADMIN — CALCIUM GLUCONATE 2 G: 20 INJECTION, SOLUTION INTRAVENOUS at 06:04

## 2023-01-23 RX ADMIN — AMPICILLIN SODIUM 2000 MG: 2 INJECTION, POWDER, FOR SOLUTION INTRAMUSCULAR; INTRAVENOUS at 19:22

## 2023-01-23 RX ADMIN — HYDRALAZINE HYDROCHLORIDE 10 MG: 20 INJECTION, SOLUTION INTRAMUSCULAR; INTRAVENOUS at 19:45

## 2023-01-23 RX ADMIN — SODIUM CHLORIDE 15 MG/HR: 0.9 INJECTION, SOLUTION INTRAVENOUS at 08:11

## 2023-01-23 RX ADMIN — PANTOPRAZOLE SODIUM 40 MG: 40 INJECTION, POWDER, FOR SOLUTION INTRAVENOUS at 08:37

## 2023-01-23 RX ADMIN — SODIUM CHLORIDE 12.5 MG/HR: 0.9 INJECTION, SOLUTION INTRAVENOUS at 01:02

## 2023-01-23 RX ADMIN — MEROPENEM 2000 MG: 1 INJECTION, POWDER, FOR SOLUTION INTRAVENOUS at 23:53

## 2023-01-23 RX ADMIN — MEROPENEM 2000 MG: 1 INJECTION, POWDER, FOR SOLUTION INTRAVENOUS at 15:10

## 2023-01-23 RX ADMIN — POTASSIUM PHOSPHATE, MONOBASIC POTASSIUM PHOSPHATE, DIBASIC 30 MMOL: 224; 236 INJECTION, SOLUTION, CONCENTRATE INTRAVENOUS at 07:22

## 2023-01-23 RX ADMIN — METOPROLOL TARTRATE 25 MG: 25 TABLET, FILM COATED ORAL at 08:37

## 2023-01-23 RX ADMIN — CLOPIDOGREL BISULFATE 75 MG: 75 TABLET ORAL at 08:37

## 2023-01-23 RX ADMIN — INSULIN LISPRO 1 UNITS: 100 INJECTION, SOLUTION INTRAVENOUS; SUBCUTANEOUS at 06:17

## 2023-01-23 RX ADMIN — HYDRALAZINE HYDROCHLORIDE 10 MG: 20 INJECTION, SOLUTION INTRAMUSCULAR; INTRAVENOUS at 17:56

## 2023-01-23 RX ADMIN — IOHEXOL 100 ML: 350 INJECTION, SOLUTION INTRAVENOUS at 14:37

## 2023-01-23 RX ADMIN — MEROPENEM 1000 MG: 1 INJECTION, POWDER, FOR SOLUTION INTRAVENOUS at 08:54

## 2023-01-23 RX ADMIN — SODIUM CHLORIDE 12.5 MG/HR: 0.9 INJECTION, SOLUTION INTRAVENOUS at 05:45

## 2023-01-23 RX ADMIN — FENTANYL CITRATE 50 MCG: 50 INJECTION INTRAMUSCULAR; INTRAVENOUS at 01:48

## 2023-01-23 RX ADMIN — FENTANYL CITRATE 50 MCG: 50 INJECTION INTRAMUSCULAR; INTRAVENOUS at 06:05

## 2023-01-23 RX ADMIN — HYDRALAZINE HYDROCHLORIDE 10 MG: 20 INJECTION, SOLUTION INTRAMUSCULAR; INTRAVENOUS at 14:00

## 2023-01-23 RX ADMIN — ACETAMINOPHEN 649.6 MG: 650 SUSPENSION ORAL at 14:13

## 2023-01-23 RX ADMIN — HYDRALAZINE HYDROCHLORIDE 10 MG: 20 INJECTION, SOLUTION INTRAMUSCULAR; INTRAVENOUS at 23:12

## 2023-01-23 RX ADMIN — AMPICILLIN SODIUM 2000 MG: 2 INJECTION, POWDER, FOR SOLUTION INTRAMUSCULAR; INTRAVENOUS at 23:05

## 2023-01-23 RX ADMIN — BUPROPION HYDROCHLORIDE 150 MG: 150 TABLET, FILM COATED, EXTENDED RELEASE ORAL at 08:37

## 2023-01-23 RX ADMIN — MAGNESIUM SULFATE HEPTAHYDRATE 2 G: 40 INJECTION, SOLUTION INTRAVENOUS at 06:03

## 2023-01-23 RX ADMIN — INSULIN LISPRO 2 UNITS: 100 INJECTION, SOLUTION INTRAVENOUS; SUBCUTANEOUS at 00:30

## 2023-01-23 RX ADMIN — POTASSIUM CHLORIDE 40 MEQ: 1.5 SOLUTION ORAL at 15:44

## 2023-01-23 RX ADMIN — WARFARIN SODIUM 4 MG: 2 TABLET ORAL at 20:27

## 2023-01-23 NOTE — ASSESSMENT & PLAN NOTE
· Initially intubated s/p cardiac arrest   · Extubated on 1/21   · Currently room air    Plan:   Continue to monitor oxygen saturation, wean as tolerated

## 2023-01-23 NOTE — CONSULTS
Nutrition follow up    Initiate Tube feed as able, initiate Jevity 1 2cal at 20 ml/hr and advance 5 ml q8hr or as tolerated to goal rate 60 ml/hr to provide 1728kcal, 80g protein, 1162 ml free water 144% RDI;      Free water flushes: 150 ml q6hr or per medical team;     Recommend to replete Phos, potassium prior to advancing to goal rate;     Consider adding thiamine, pt high risk for refeeding syndrome, given prolong inadequate PO intake, hypokalemia, hypophosphatemia     Nutrition will continue to follow up as per policy

## 2023-01-23 NOTE — ASSESSMENT & PLAN NOTE
Lab Results   Component Value Date    SODIUM 141 01/23/2023    SODIUM 141 01/22/2023    SODIUM 136 01/21/2023       · Etiology, possibly hypovolemic due to sepsis but was resuscitated and had BP ranges that were on the higher end   · She is on seroquel, zoloft and  lisinopril which can all contribute to SIADH development, particularly the zoloft  · We will continue to monitor her sodium level and hold her zoloft, seroquel and lisinopril for now  ·

## 2023-01-23 NOTE — CONSULTS
Vancomycin therapy has been discontinued  Pharmacy will sign off  Thank you for this consult  Please do not hesitate to call us with questions or re-consult us if the need arises       Dalila Alvarez, PharmD, [address] [facility] Clinical Pharmacist  [phone] or via Dani

## 2023-01-23 NOTE — ASSESSMENT & PLAN NOTE
Lab Results   Component Value Date    HGBA1C 9 8 (H) 10/25/2022       Recent Labs     01/22/23  1150 01/22/23  1801 01/23/23  0018 01/23/23  0616   POCGLU 166* 157* 264* 203*       Blood Sugar Average: Last 72 hrs:  (P) 172 [id number]   Home medications: Lantus 60 at bedtime NovoLog 20 prior to meals  Was previously on insulin GTT  Has since been discontinued  Continue with SSI  Glycemia protocol

## 2023-01-23 NOTE — ASSESSMENT & PLAN NOTE
Lab Results   Component Value Date    MG 1 7 (L) 01/24/2023    MG 1 8 (L) 01/23/2023    MG 1 7 (L) 01/22/2023       Will replete 4 g  Plan: Maintain magnesium greater than 2 per cardiology recommendations

## 2023-01-23 NOTE — ASSESSMENT & PLAN NOTE
S/p L AKA 11/23/22   S/p R AKA, wound debridement 12/1/22   S/p R AKA wound/stump washout and VAC placement 12/16/22   S/p R AKA wound/stump VAC change, L AKA stump washout/packing 12/19/22   Wound Cx 12/19/22: enterobacter cloacae   Vascular Surgery on board  Plan: Continue w/ home plavix

## 2023-01-23 NOTE — ASSESSMENT & PLAN NOTE
WBC pending  · POA: Septic  Source likely from osteomyelitis/ Bacteremia  · Unresponsive on 1/18 and without pulse  ROSC obtained following 1mg epinephrine and defibrillation with 200 joules  Arrest may be related to ischemia in the setting of infection  · ID on board managing antibiotics  · Blood cultures show polymicrobial   Enterococcus faecalis, Enterobacter Cloacae  · Blood cultures from 1/23 currently NGTD  · MRSA negative  Afebrile the past 24 hours  Plan:  ID onboard thank you for recommendations  Maintain on IV antibiotics with meropenem and vancomycin per ID  Antibiotic day 7 currently on meropenem/ampicillin (vancomycin discontinued 1/23)  Reached out to surgery to vascular surgery regarding osteomyelitis finding on CT scan, told to keep NPO overnight  Repeat blood cultures negative x 24 hrs  Continue to monitor vitals, trend for any further fevers    Per cardiology, LEON pending after mental status improves

## 2023-01-23 NOTE — PROCEDURES
Suture removal    Date/Time: 1/23/2023 4:58 AM  Performed by: ALEX Padilla  Authorized by: ALEX Padilla   Universal Protocol:  Patient identity confirmed: arm band and hospital-assigned identification number        Patient location:  Bedside  Location:     Location:  [address] [facility] location:  Forehead  Procedure details: Tools used:  Suture removal kit, scissors and tweezers    Wound appearance:  No sign(s) of infection and nonpurulent    Number of sutures removed:  10  Post-procedure details:     Patient tolerance of procedure:   Tolerated well, no immediate complications  Comments:      Appears to be healing well, no signs of infection, 10 sutures were removed, patient tolerated well, thin layer of skin glue was applied over the wound to help it approximate better

## 2023-01-23 NOTE — PROGRESS NOTES
Cardiology         Progress Note - Cardiology   Mitchel Bocanegra 65 y o  female MRN: [id number]  Unit/Bed#: ICU 12 Encounter: [id number]          Assessment/Recommendations/Discussion:     1  Cardiac arrest secondary to electrolyte abnormalities and severe sepsis  2  Severe anemia  3  Hypertension  4  Peripheral atrial disease  5  Heparin-induced thrombocytopenia  6  History of CVA  7  Diabetes  8  Polymicrobial bacteremia with Enterobacter and Enterococcus faecalis, possible urinary source  9  Fall with facial injury      · Patient extubated  Mental status still poor, quite lethargic  Patient tolerating some commands, difficulty opening mouth  Would like to postpone LEON for another 2 to 3 days until there is improvement in her mental status  High risk of anesthesia at this time  · Eventual coronary angiography  · Continue amiodarone infusion, telemetry remained stable  When she is able to swallow oral medications, will switch to p o   · Okay to resume tube feeds, discussed with RN  · Continue nicardipine infusion for accelerated hypertension, titration as able          Subjective: Patient seen and examined, extremely lethargic although opens eyes briefly and follows commands                  Physical Exam:  GEN:  NAD, extremely lethargic  HEENT:  MMM, NCAT, pink conjunctiva, EOMI, nonicteric sclera  CV:  NO JVD/HJR but obese neck, RR, NO M/R/G, +S1/S2, NO PARASTERNAL HEAVE/THRILL, NO LE EDEMA, NO HEPATIC SYSTOLIC PULSATION, WARM EXTREMITIES  RESP:  CTAB/L anteriorly  ABD:  SOFT        Vitals:   /65   Pulse 90   Temp 98 3 °F (36 8 °C) (Oral)   Resp (!) 27   Ht 5' 4" (1 626 m)   Wt 72 1 kg (159 lb)   SpO2 97%   BMI 27 29 kg/m²   Vitals:    01/18/23 1100   Weight: 72 1 kg (159 lb)       Intake/Output Summary (Last 24 hours) at 1/23/2023 1120  Last data filed at 1/23/2023 4920  Gross per 24 hour   Intake 2363 62 ml   Output 1600 ml   Net 763 62 ml       TELEMETRY: SR  Lab Results:  Results from last 7 days   Lab Units 23  0433   WBC Thousand/uL 29 57*   HEMOGLOBIN g/dL 8 5*   HEMATOCRIT % 26 1*   PLATELETS Thousands/uL 449*     Results from last 7 days   Lab Units 23  0433 23  0624   POTASSIUM mmol/L 2 8* 3 6   CHLORIDE mmol/L 109* 110*   CO2 mmol/L 23 24   BUN mg/dL 12 15   CREATININE mg/dL 0 46* 0 51*   CALCIUM mg/dL 7 5* 7 2*   ALK PHOS U/L  --  199*   ALT U/L  --  19   AST U/L  --  16     Results from last 7 days   Lab Units 23  0433   POTASSIUM mmol/L 2 8*   CHLORIDE mmol/L 109*   CO2 mmol/L 23   BUN mg/dL 12   CREATININE mg/dL 0 46*   CALCIUM mg/dL 7 5*           Medications:    Current Facility-Administered Medications:   •  acetaminophen (TYLENOL) rectal suppository 650 mg, 650 mg, Rectal, Q6H PRN, Tamar Kern MD, 650 mg at 23 1900  •  [] amiodarone (CORDARONE) 900 mg in dextrose 5 % 500 mL infusion, 1 mg/min, Intravenous, Continuous, Stopped at 23 1643 **FOLLOWED BY** amiodarone (CORDARONE) 900 mg in dextrose 5 % 500 mL infusion, 0 5 mg/min, Intravenous, Continuous, Tiney Shine, CRNP, Last Rate: 16 7 mL/hr at 23 0903, 0 5 mg/min at 23 3183  •  amLODIPine (NORVASC) tablet 10 mg, 10 mg, Oral, Daily, BEN SimmonsNP, 10 mg at 23 7882  •  ampicillin (OMNIPEN) 2,000 mg in sodium chloride 0 9 % 100 mL IVPB, 2,000 mg, Intravenous, Q4H, Erinn Valdes MD  •  bisacodyl (DULCOLAX) rectal suppository 10 mg, 10 mg, Rectal, Daily PRN, ALEX Coley  •  buPROPion (WELLBUTRIN XL) 24 hr tablet 150 mg, 150 mg, Oral, Daily, Wan Alcantara DO, 150 mg at 23 8251  •  chlorhexidine (PERIDEX) 0 12 % oral rinse 15 mL, 15 mL, Mouth/Throat, Q12H [facility], Primus Lay KETURAH Barragan, 15 mL at 23 3220  •  clopidogrel (PLAVIX) tablet 75 mg, 75 mg, Oral, Daily, Wan Alcantara DO, 75 mg at 23 4957  •  fentanyl citrate (PF) 100 MCG/2ML 50 mcg, 50 mcg, Intravenous, Q2H PRN, Shiv Damian MD, 50 mcg at 23 4959  •  insulin lispro (HumaLOG) 100 units/mL subcutaneous injection 1-5 Units, 1-5 Units, Subcutaneous, Q6H [address], 1 Units at 01/23/23 0617 **AND** Fingerstick Glucose (POCT), , , Q6H, Sondra Emmanuel PA-C  •  lisinopril (ZESTRIL) tablet 40 mg, 40 mg, Oral, Daily, Tiney Shine, CRNP, 40 mg at 01/23/23 2254  •  meropenem (MERREM) 1,000 mg in sodium chloride 0 9 % 100 mL IVPB, 1,000 mg, Intravenous, Q8H, Erinn Valdes MD, Last Rate: 100 mL/hr at 01/23/23 0854, 1,000 mg at 01/23/23 0854  •  metoprolol tartrate (LOPRESSOR) tablet 25 mg, 25 mg, Oral, Q12H [address], TinBEN HolmanNP, 25 mg at 01/23/23 3102  •  niCARdipine (CARDENE) 25 mg (STANDARD CONCENTRATION) in sodium chloride 0 9% 250 mL, 1-15 mg/hr, Intravenous, Titrated, ALEX Coley, Last Rate: 100 mL/hr at 01/23/23 0936, 10 mg/hr at 01/23/23 0936  •  pantoprazole (PROTONIX) injection 40 mg, 40 mg, Intravenous, Q24H [address], Tinminnie Jamese, CRNP, 40 mg at 01/23/23 5358  •  perflutren lipid microsphere (DEFINITY) injection, 0 4 mL/min, Intravenous, Once in imaging, ALEX Simmons  •  polyethylene glycol (MIRALAX) packet 17 g, 17 g, Oral, Daily, ALEX Coley, 17 g at 01/23/23 5357  •  potassium chloride oral solution 40 mEq, 40 mEq, Oral, Once, ALEX Coley  •  potassium phosphates 30 mmol in sodium chloride 0 9 % 250 mL infusion, 30 mmol, Intravenous, Once, ALEX Coley, Last Rate: 41 7 mL/hr at 01/23/23 0722, 30 mmol at 01/23/23 7929  •  senna (SENOKOT) tablet 8 6 mg, 1 tablet, Oral, HS, ALEX Coley, 8 6 mg at 01/22/23 2250  •  sertraline (ZOLOFT) tablet 100 mg, 100 mg, Oral, Daily, Wan Alcantara DO, 100 mg at 01/23/23 9916    This note was completed in part utilizing [employer] Direct Software  Grammatical errors, random word insertions, spelling mistakes, and incomplete sentences may be an occasional consequence of this system secondary to software limitations, ambient noise, and hardware issues  If you have any questions or concerns about the content, text, or information contained within the body of this dictation, please contact the provider for clarification

## 2023-01-23 NOTE — ASSESSMENT & PLAN NOTE
· The etiology is not entirely known  DDX include primary cardiac event/ arrhythmia given her history of significant vascular disease, PE, Aspiration, electrolyte abnormality, vs multifactorial 2/2 bacteremia  Less likely PE given elevated INR  · Initial EKG s/p cardiac arrest - no overt acute ST segment elevation but there is some evidence of up-sloping of her ST segment suggesting ischemia  · Repeat EKG yesterday unremarkable  Plan:  Cardiology on board, maintain amiodarone per cardiology recommendations    Continue amiodarone

## 2023-01-23 NOTE — ASSESSMENT & PLAN NOTE
Patient had stroke on 11/14 right hemispheric, with residual left-sided weakness   -no acute findings on ct scan 1/18  -neuro checks

## 2023-01-23 NOTE — PROGRESS NOTES
Progress Note - Infectious Disease   Mian Sequeira 65 y o  female MRN: [id number]  Unit/Bed#: ICU 12 Encounter: [id number]      Impression/Plan:  1   Sepsis, POA   Patient met sepsis criteria on presentation   Sources are 2 and 3  Course complicated by 4   Extensive resistance developing on prior cultures   2D echo technically difficult study   Extensive CT imaging without any focal findings otherwise  The patient is not requiring pressors, fevers improving but worsening leukocytosis today  Worsening white count possibly drug related with recent eosinophilia noted  Consider also undrained focus involving the right stump given change to the quality and drainage  Other consideration is for an endovascular infection  Antibiotics adjusted as below  Continue to trend fever curve/vitals  Repeat CBC and CMP tomorrow  Follow-up pending blood cultures  Follow-up additional imaging  Additional supportive care as per primary  Additional interventions pending clinical course     2   Polymicrobial bacteremia   Blood cultures have isolated Enterobacter and Enterococcus faecalis  Enterobacter resistant to Ertapenem but no carbapenemase present on confirmatory testing   Consider urinary source, no other ectopic focus of infection on extensive imaging   2D echo limited   Consider also endovascular source given 4   Other potential sources are the patient's wound on her right AKA site which is now draining seropurulent fluid and CT without contrast was abnormal   Previously isolated these organisms from stump site cultures  Patient with tenderness over the AKA site  No other intravascular devices appreciated on  imaging  Patient has IUD in place which is otherwise unremarkable  Repeat cultures are clearing and patient is also pending transesophageal echo    Continue meropenem, dosing increased today  Discontinue vancomycin  Switch to ampicillin  Continue to trend fever curve/vitals  Repeat CBC and CMP tomorrow  Follow-up contrast imaging of the right stump  Follow-up repeat blood cultures  Transesophageal echo when possible  Anticipate at minimum 2 weeks of IV antibiotic for this issue  Potential for oral therapy thereafter for bone and joint site infection  Additional interventions pending clinical course     3   Complicated urinary tract infection   Patient unable to provide any history at this time  Mario De Leon was grossly abnormal on presentation   Possible source of the above   Urine cultures reviewed  Continue antibiotic as above  Monitor abdominal exam  Catheters as per primary     4   Cardiac arrest   Suspect that this may have been unrelated to the above   Patient with multiple other comorbidities as well   Ongoing care and work-up as per critical care/cardiology  Patient extubated 2023     5   Transaminitis   Acute elevation noted likely in the setting of 4   We will need to monitor LFTs however on meropenem   Improving  Antibiotic as above  Repeat LFTs tomorrow  Monitor abdominal exam     Above plan discussed in detail with the critical care service and with cardiology attending  Antibiotic dosing and regimen reviewed with clinical ID pharmacist     ID consult service will continue to follow      Antibiotics:  Meropenem/vancomycin 6  Total antibiotic 7    24 Hour events:  Yesterday and overnight notes reviewed and no acute events noted  Subjective:  Patient seen at bedside and she will intermittently open her eyes and interact but she is not consistent  Discussed wound exam findings with critical care service  Contacted by cardiology service and patient is pending LEON which is to be planned over the next few days      Objective:  Vitals:  Temp:  [99 3 °F (37 4 °C)-100 1 °F (37 8 °C)] 100 1 °F (37 8 °C)  HR:  [] 106  Resp:  [13-30] 26  BP: (155-199)/(57-78) 163/60  SpO2:  [96 %-100 %] 97 %  Temp (24hrs), Av 6 °F (37 6 °C), Min:99 3 °F (37 4 °C), Max:100 1 °F (37 8 °C)  Current: Temperature: 100 1 °F (37 8 °C)    Physical Exam:   General Appearance:   Chronically ill-appearing, frail, nontoxic in no acute distress  She intermittently opens her eyes and inconsistently interacts  Throat: Oropharynx moist without lesions  Lungs:    Decreased breath sounds throughout, no wheezes, rales or rhonchi   Heart:  RRR; no murmur, rub or gallop appreciated   Abdomen:   Soft, non-tender, non-distended, positive bowel sounds  Extremities: No clubbing, cyanosis; anasarca in all of her extremities   Skin: No new rashes or lesions  No new draining wounds noted  The right lower extremity stump incision is now draining seropurulent drainage which is changed compared to prior  Labs, Imaging, & Other studies:   All pertinent labs and imaging studies were personally reviewed as below  Results from last 7 days   Lab Units 01/23/23  0433 01/22/23  0624 01/21/23  0539   WBC Thousand/uL 29 57* 20 62* 15 66*   HEMOGLOBIN g/dL 8 5* 7 5* 7 2*   PLATELETS Thousands/uL 449* 289 239     Results from last 7 days   Lab Units 01/23/23  0433 01/22/23  0624 01/21/23  0539 01/20/23  1501 01/20/23  0544   POTASSIUM mmol/L 2 8* 3 6 3 3*   < > 2 9*   CHLORIDE mmol/L 109* 110* 105   < > 101   CO2 mmol/L 23 24 20*   < > 22   BUN mg/dL 12 15 22   < > 24   CREATININE mg/dL 0 46* 0 51* 0 79   < > 0 84   EGFR ml/min/1 73sq m 107 104 81   < > 75   CALCIUM mg/dL 7 5* 7 2* 7 4*   < > 7 3*   AST U/L  --  16 29  --  39   ALT U/L  --  19 28  --  40   ALK PHOS U/L  --  199* 205*  --  170*    < > = values in this interval not displayed  Results from last 7 days   Lab Units 01/21/23  0538 01/19/23  1226 01/19/23  0019 01/17/23  1641 01/17/23  1606 01/17/23  1553   BLOOD CULTURE  No Growth at 24 hrs  No Growth at 24 hrs  No Growth at 72 hrs    No Growth at 72 hrs   --   --  Enterobacter cloacae*  Enterococcus faecalis* Enterococcus faecalis*   GRAM STAIN RESULT   --   --   --   --  Gram negative rods*  Gram positive cocci in pairs and chains* Gram positive cocci in pairs and chains*   URINE CULTURE   --   --   --  >100,000 cfu/ml Enterococcus faecalis*  <10,000 cfu/ml Citrobacter freundii*  <10,000 cfu/ml Klebsiella variicola*  --   --    MRSA CULTURE ONLY   --   --  No Methicillin Resistant Staphlyococcus aureus (MRSA) isolated  --   --   --        Lab interpretation/comments: White count rising  Recent differential noted for eosinophilia  Possibly drug related  Imaging interpretation/comments: CT imaging of the left stump without any acute findings other than a nondisplaced fracture  There is some evidence of healing there  CT however of the right stump is abnormal with evidence of air into the medullary space and nondisplaced fracture  CT imaging of the abdomen was done without contrast   There are some degree of fecal impaction but otherwise no other significant findings  IUD in place  She did have contrast imaging on 1/17  Culture data: Prior culture data is reviewed  Repeat blood cultures multiple sets remain without growth  E test for meropenem requested on Enterococcus isolate from the lab

## 2023-01-23 NOTE — ASSESSMENT & PLAN NOTE
Lab Results   Component Value Date    K 3 6 01/24/2023    K 2 8 (L) 01/23/2023    K 3 6 01/22/2023     · We will replete her potassium- our goal will be to maintain the level > 4  · Will repleat with 20 IV and 40 oral

## 2023-01-23 NOTE — ASSESSMENT & PLAN NOTE
Lab Results   Component Value Date    INR 1 22 (H) 01/23/2023    INR 2 97 (H) 01/20/2023    INR 4 29 (H) 01/18/2023       History of HIT  Platelets remain stable  On AC at home w/ warfarin, with Goal INR 2-3  Currently holding warfarin 2/2 supratherapeutic INR  Plan: Continue with Plavix and warfarin  Monitor INR

## 2023-01-23 NOTE — PROGRESS NOTES
[address] [facility]  Progress Note - [address] [facility] [address] 1960, 65 y o  female MRN: [id number]  Unit/Bed#: ICU 12 Encounter: [id number]  Primary Care Provider: ALEX Chinchilla   Date and time admitted to hospital: 1/17/2023  3:48 PM    * Sepsis due to urinary tract infection ([facility] [address] )  Assessment & Plan  · POA: Septic  Source likely UTI and bacteremia with Enterobacter and Enterococcus faecalis  · Unresponsive on 1/18 and without pulse  ROSC obtained following 1mg epinephrine and defibrillation with 200 joules  Arrest may be related to ischemia in the setting of infection  · ID on board managing antibiotics  · Vascular surgery consulted and no concern for bilateral AKA stumps as source  · Blood cultures from 1/19 currently NGTD for 48 hours  Plan:  Maintain on IV antibiotics with meropenem and vancomycin per ID  Meropenem vanc day 6  Repeat blood cultures negative x 24 hrs  Continue to monitor vitals, trend for any further fevers  Per cardiology, LEON pending    Cardiac arrest [facility])  Assessment & Plan  · The etiology is not entirely known  DDX include primary cardiac event/ arrhythmia given her history of significant vascular disease, PE, Aspiration, electrolyte abnormality, vs multifactorial 2/2 bacteremia  Less likely PE given elevated INR  · Initial EKG s/p cardiac arrest - no overt acute ST segment elevation but there is some evidence of up-sloping of her ST segment suggesting ischemia  · Repeat EKG yesterday unremarkable  Plan:  Cardiology on board, maintain amiodarone per cardiology recommendations  Continue amiodarone    Acute respiratory failure with hypoxia (HCC)-resolved as of 1/23/2023  Assessment & Plan  · Initially intubated s/p cardiac arrest   · Extubated on 1/21   · Currently room air    Plan:   Continue to monitor oxygen saturation, wean as tolerated  HIT (heparin-induced thrombocytopenia)  Assessment & Plan  History of HIT  Platelets remain stable    On AC at home w/ warfarin, with Goal INR 2-3  Currently holding warfarin 2/2 supratherapeutic INR  Plan: Continue with Plavix  Monitor INR  Hypomagnesemia  Assessment & Plan  Magnesium decreased at 1 8  Will replete 2 g  Plan: Maintain magnesium greater than 2 per cardiology recommendations  Troponin level elevated  Assessment & Plan  · Type II non-MI likely 2/2 demand  · Cardiology following  No immediate ischemic w/u needed at this time  Will need after acute illness  · ECHO 1/18- EF 06-63%    Metabolic acidosis  Assessment & Plan  · In the setting of cardiac arrest, her lactic acid was elevated and now normalized  · We will continue to monitor her electrolytes and acid/base balance    Fall  Assessment & Plan  -pta fall  -facial ecchymosis  -pain and wound care    Metabolic encephalopathy  Assessment & Plan  -cont neuro checks  -cth 1/18- no acute finding    History of CVA (cerebrovascular accident)  Assessment & Plan  -no acute findings on ct scan 1/18  -neuro checks    Febrile  Assessment & Plan  -Cont tylenol  -id following  -cont meropenem  -will need cathie prior to extubation    PAD (peripheral artery disease) ([facility])  Assessment & Plan  Has BL AKA  Vascular Surgery on board  Plan: Continue w/ home plavix      Hypokalemia  Assessment & Plan  Lab Results   Component Value Date    K 2 8 (L) 01/23/2023    K 3 6 01/22/2023    K 3 3 (L) 01/21/2023       · Her potassium was 2 9 then 3 post arrest   · Her hypokalemia could be a contributing factor  · We will replete her potassium- our goal will be to maintain the level > 4  · Given 20, 30 then 40 meq IV  · gettiung potassium phos 30 q 6    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)  Assessment & Plan  Lab Results   Component Value Date    HGBA1C 9 8 (H) 10/25/2022       Recent Labs     01/22/23  1150 01/22/23  1801 01/23/23  0018 01/23/23  0616   POCGLU 166* 157* 264* 203*       Blood Sugar Average: Last 72 hrs:  (P) 795 [id number]     Was previously on insulin GTT  Has since been discontinued  Continue with SSI  Glycemia protocol  Moderate protein-calorie malnutrition ([facility] [address] )  Assessment & Plan  Malnutrition Findings:       BMI Findings: Body mass index is 27 29 kg/m²  -start tube feeds today if stays intubated    Esophageal reflux  Assessment & Plan  protonix IV    Hyponatremia-resolved as of 2023  Assessment & Plan  Lab Results   Component Value Date    SODIUM 141 2023    SODIUM 141 2023    SODIUM 136 2023       · Etiology, possibly hypovolemic due to sepsis but was resuscitated and had BP ranges that were on the higher end   · She is on seroquel, zoloft and  lisinopril which can all contribute to SIADH development, particularly the zoloft  · We will continue to monitor her sodium level and hold her zoloft, seroquel and lisinopril for now  ·     ----------------------------------------------------------------------------------------  HPI/24hr events:   None    Patient appropriate for transfer out of the ICU today?: No  Disposition: Continue Critical Care   Code Status: Level 3 - DNAR and DNI  ---------------------------------------------------------------------------------------  SUBJECTIVE  Patient appears to be in a deep sleep, despite her eyes being slightly open  Per my exam is pale but resting comfortably in bed  Does not appear to be in distress      Review of Systems   Unable to perform ROS: Acuity of condition     ---------------------------------------------------------------------------------------  OBJECTIVE    Vitals   Vitals:    23 0823 23 0900 23 0928 23 0936   BP: 163/60 (!) 140/49 140/53 154/65   Pulse: (!) 106 104 94 94   Resp: (!) 26 (!) 25 (!) 26 (!) 26   Temp: 98 3 °F (36 8 °C)      TempSrc: Oral      SpO2: 97% 97% 97% 97%   Weight:       Height:         Temp (24hrs), Av 3 °F (37 4 °C), Min:98 3 °F (36 8 °C), Max:100 1 °F (37 8 °C)  Current: Temperature: 98 3 °F (36 8 °C)  Arterial Line BP: 156/84  Arterial Line MAP (mmHg): 100 mmHg    Respiratory:  SpO2 Device: O2 Device: Nasal cannula  Nasal Cannula O2 Flow Rate (L/min): 3 L/min    Invasive/non-invasive ventilation settings   Respiratory    Lab Data (Last 4 hours)    None         O2/Vent Data (Last 4 hours)    None                Physical Exam  Vitals reviewed  Constitutional:       Appearance: She is ill-appearing and diaphoretic  Comments: Asleep   HENT:      Head:      Comments: Stitched laceration on forehead  Bruising under left eye  Eyes:      Conjunctiva/sclera: Conjunctivae normal    Cardiovascular:      Rate and Rhythm: Normal rate and regular rhythm  Heart sounds: Normal heart sounds  No murmur heard  Pulmonary:      Effort: Pulmonary effort is normal  No respiratory distress  Comments: Room air  Abdominal:      Palpations: Abdomen is soft  Comments: Hypoactive bowel sounds   Musculoskeletal:      Comments: Bilateral AKA   Skin:     Coloration: Skin is pale             Laboratory and Diagnostics:  Results from last 7 days   Lab Units 01/23/23  0433 01/22/23  0624 01/21/23  0539 01/20/23  2135 01/20/23  1501 01/20/23  0544 01/19/23  0448 01/18/23  1935 01/18/23  0832 01/18/23  0530 01/17/23  1605   WBC Thousand/uL 29 57* 20 62* 15 66*  --   --  13 89* 17 24* 14 53* 26 92* 16 42* 16 48*   HEMOGLOBIN g/dL 8 5* 7 5* 7 2* 7 7* 6 4* 5 4* 7 2* 7 7* 8 9* 9 6* 9 7*   HEMATOCRIT % 26 1* 21 9* 21 3* 22 9* 19 1* 17 5* 22 4* 24 8* 29 0* 30 5* 31 2*   PLATELETS Thousands/uL 449* 289 239  --   --  248 232 253 332 267 269   NEUTROS PCT %  --   --  69  --   --   --  81* 83*  --  89* 88*   MONOS PCT %  --   --  7  --   --   --  11 10  --  5 6   MONO PCT %  --   --   --   --   --   --   --   --  8  --   --      Results from last 7 days   Lab Units 01/23/23  0433 01/22/23  0624 01/21/23  0539 01/20/23  1501 01/20/23  0544 01/19/23  0448 01/18/23  1935 01/18/23  0832 01/18/23  0530   SODIUM mmol/L 141 141 136 135 135 136 137 132* 133*   POTASSIUM mmol/L 2 8* 3 6 3 3* 3 1* 2 9* 3 8 3 3* 3 0* 2 9*   CHLORIDE mmol/L 109* 110* 105 103 101 103 105 97 96   CO2 mmol/L 23 24 20* 23 22 21 23 16* 21   ANION GAP mmol/L 9 7 11 9 12 12 9 19* 16*   BUN mg/dL 12 15 22 24 24 23 22 15 13   CREATININE mg/dL 0 46* 0 51* 0 79 0 84 0 84 0 91 0 97 0 82 0 68   CALCIUM mg/dL 7 5* 7 2* 7 4* 7 3* 7 3* 7 4* 7 5* 7 1* 7 9*   GLUCOSE RANDOM mg/dL 172* 144* 84 104 112 112 151* 351* 210*   ALT U/L  --  19 28  --  40 51 71* 101* 10   AST U/L  --  16 29  --  39 47* 89* 174* 20   ALK PHOS U/L  --  199* 205*  --  170* 121* 134* 132* 140*   ALBUMIN g/dL  --  2 3* 2 5*  --  2 3* 2 5* 2 6* 2 7* 3 1*   TOTAL BILIRUBIN mg/dL  --  0 47 0 51  --  0 38 0 36 0 37 0 49 0 52     Results from last 7 days   Lab Units 01/23/23  0433 01/22/23  0624 01/21/23  0539 01/20/23  0544 01/19/23  1242 01/18/23  0832 01/17/23  1605   MAGNESIUM mg/dL 1 8* 1 7* 2 1 2 1 2 4 1 8* 1 5*   PHOSPHORUS mg/dL 1 7* 2 6  --  3 9 3 7 5 2*  --       Results from last 7 days   Lab Units 01/23/23  0433 01/20/23  0905 01/18/23  0530 01/17/23  1605   INR  1 22* 2 97* 4 29* 2 96*   PTT seconds  --   --   --  57*          Results from last 7 days   Lab Units 01/18/23  1050 01/18/23  0834 01/17/23  1605   LACTIC ACID mmol/L 1 5 6 7* 1 1     ABG:  Results from last 7 days   Lab Units 01/18/23  1036   PH ART  7 421   PCO2 ART mm Hg 29 7*   PO2 ART mm Hg 223 0*   HCO3 ART mmol/L 18 9*   BASE EXC ART mmol/L -4 4   ABG SOURCE  Line, Arterial     VBG:  Results from last 7 days   Lab Units 01/18/23  1036 01/17/23  1605   PH JORDEN   --  7 488*   PCO2 JORDEN mm Hg  --  29 3*   PO2 JORDEN mm Hg  --  54 0*   HCO3 JORDEN mmol/L  --  21 7*   BASE EXC JORDEN mmol/L  --  -0 9   ABG SOURCE  Line, Arterial  --      Results from last 7 days   Lab Units 01/17/23  2116 01/17/23  1605   PROCALCITONIN ng/ml 1 37* 0 27*       Micro  Results from last 7 days   Lab Units 01/21/23  0538 01/19/23  1226 01/19/23  0019 01/17/23  1641 01/17/23  1606 01/17/23  1553   BLOOD CULTURE  No Growth at 24 hrs  No Growth at 24 hrs  No Growth at 72 hrs  No Growth at 72 hrs   --   --  Enterobacter cloacae*  Enterococcus faecalis* Enterococcus faecalis*   GRAM STAIN RESULT   --   --   --   --  Gram negative rods*  Gram positive cocci in pairs and chains* Gram positive cocci in pairs and chains*   URINE CULTURE   --   --   --  >100,000 cfu/ml Enterococcus faecalis*  <10,000 cfu/ml Citrobacter freundii*  <10,000 cfu/ml Klebsiella variicola*  --   --    MRSA CULTURE ONLY   --   --  No Methicillin Resistant Staphlyococcus aureus (MRSA) isolated  --   --   --        EKG: On telemetry  Imaging: I have personally reviewed pertinent reports  Intake and Output  I/O       01/20 0701 01/21 0700 01/21 0701  01/22 0700 01/22 0701 01/23 0700    I V  (mL/kg) 1122 (15 6) 672 (9 3)     Blood 648 3      NG/ 100     IV Piggyback 550      Feedings 95 121     Total Intake(mL/kg) 2665 3 (37) 893 (12 4)     Urine (mL/kg/hr) 570 (0 3) 800 (0 5)     Emesis/NG output       Stool 0      Total Output 570 800     Net +2095 3 +93            Unmeasured Stool Occurrence 1 x            Height and Weights   Height: 5' 4" (162 6 cm)  IBW (Ideal Body Weight): 54 7 kg  Body mass index is 27 29 kg/m²  Weight (last 2 days)     None            Nutrition       Diet Orders   (From admission, onward)             Start     Ordered    01/23/23 0001  Diet NPO  Diet effective midnight        Comments: Hold TFs in anticipation of LEON tomorrow  References:    Nutrtion Support Algorithm Enteral vs  Parenteral   Question Answer Comment   Diet Type NPO    RD to adjust diet per protocol?  Yes        01/22/23 1603                  Active Medications  Scheduled Meds:  Current Facility-Administered Medications   Medication Dose Route Frequency Provider Last Rate   • acetaminophen  650 mg Rectal Q6H PRN Inocencia Worley MD • amiodarone  0 5 mg/min Intravenous Continuous BEN ManriqueNP 0 5 mg/min (01/23/23 0603)   • amLODIPine  10 mg Oral Daily Valerie Gomez CRNP     • bisacodyl  10 mg Rectal Daily PRN Larwilli Sotelo, CRNP     • buPROPion  150 mg Oral Daily Wan Alcantara DO     • chlorhexidine  15 mL Mouth/Throat Q12H Juanito Chamorro PA-C     • clopidogrel  75 mg Oral Daily Wan Alcantara DO     • fentanyl citrate (PF)  50 mcg Intravenous Q2H PRN Jazz Caldera MD     • insulin lispro  1-5 Units Subcutaneous Q6H [facility] Zenia Nissen, PA-C     • lisinopril  40 mg Oral Daily Valerie Gomez, CRNP     • meropenem  1,000 mg Intravenous Q8H Gumaro De Guzman MD 1,000 mg (01/23/23 0854)   • metoprolol tartrate  25 mg Oral Q12H [address] [facility] 3048, CRNP     • niCARdipine  1-15 mg/hr Intravenous Titrated Aramis Sotelo, CRNP 10 mg/hr (01/23/23 0936)   • pantoprazole  40 mg Intravenous Q24H [facility] ALEX Portillo     • perflutren lipid microsphere  0 4 mL/min Intravenous Once in [address] [place] [address] Av, CRNP     • polyethylene glycol  17 g Oral Daily Laroy Brush, CRNP     • potassium chloride  40 mEq Oral Once Laroy Brush, CRNP     • potassium phosphate  30 mmol Intravenous Once Laroy Brush, CRNP 30 mmol (01/23/23 4512)   • senna  1 tablet Oral HS Laroy Brush, CRNP     • sertraline  100 mg Oral Daily Wan Alcantara DO     • vancomycin  1,250 mg Intravenous Q24H Brigitte Carlson MD Stopped (01/23/23 0800)     Continuous Infusions:  amiodarone, 0 5 mg/min, Last Rate: 0 5 mg/min (01/23/23 0903)  niCARdipine, 1-15 mg/hr, Last Rate: 10 mg/hr (01/23/23 0936)      PRN Meds:   acetaminophen, 650 mg, Q6H PRN  bisacodyl, 10 mg, Daily PRN  fentanyl citrate (PF), 50 mcg, Q2H PRN  perflutren lipid microsphere, 0 4 mL/min, Once in imaging        Invasive Devices Review  Invasive Devices     Peripheral Intravenous Line  Duration           Peripheral IV 01/20/23 Left Forearm 2 days Peripheral IV 01/20/23 Upper;Medial Arm 2 days          Arterial Line  Duration           Arterial Line 01/18/23 Radial 5 days          Drain  Duration           External Urinary Catheter <1 day    NG/OG/Enteral Tube Nasogastric Right nare <1 day                Rationale for remaining devices: Medical necessity  ---------------------------------------------------------------------------------------  Advance Directive and Living Will:      Power of : Yes  POLST:    ---------------------------------------------------------------------------------------  Care Time Delivered:   Deferred to attending      Ana Torres MD      Portions of the record may have been created with voice recognition software  Occasional wrong word or "sound a like" substitutions may have occurred due to the inherent limitations of voice recognition software    Read the chart carefully and recognize, using context, where substitutions have occurred

## 2023-01-24 PROBLEM — R50.9 FEBRILE: Status: RESOLVED | Noted: 2022-11-13 | Resolved: 2023-01-24

## 2023-01-24 PROBLEM — R41.82 ALTERED MENTAL STATUS: Status: ACTIVE | Noted: 2023-01-24

## 2023-01-24 PROBLEM — M86.9 OSTEOMYELITIS (HCC): Status: ACTIVE | Noted: 2023-01-24

## 2023-01-24 PROBLEM — E87.20 METABOLIC ACIDOSIS: Status: RESOLVED | Noted: 2023-01-18 | Resolved: 2023-01-24

## 2023-01-24 LAB
2HR DELTA HS TROPONIN: 0 NG/L
4HR DELTA HS TROPONIN: -2 NG/L
ALBUMIN SERPL BCP-MCNC: 2.4 G/DL (ref 3.5–5)
ALP SERPL-CCNC: 187 U/L (ref 34–104)
ALT SERPL W P-5'-P-CCNC: 12 U/L (ref 7–52)
AMMONIA PLAS-SCNC: 38 UMOL/L (ref 18–72)
ANION GAP SERPL CALCULATED.3IONS-SCNC: 9 MMOL/L (ref 4–13)
ANISOCYTOSIS BLD QL SMEAR: PRESENT
AST SERPL W P-5'-P-CCNC: 11 U/L (ref 13–39)
ATRIAL RATE: 94 BPM
BACTERIA BLD CULT: ABNORMAL
BACTERIA BLD CULT: NORMAL
BACTERIA BLD CULT: NORMAL
BASOPHILS # BLD MANUAL: 0 THOUSAND/UL (ref 0–0.1)
BASOPHILS NFR MAR MANUAL: 0 % (ref 0–1)
BILIRUB SERPL-MCNC: 0.42 MG/DL (ref 0.2–1)
BUN SERPL-MCNC: 10 MG/DL (ref 5–25)
CALCIUM ALBUM COR SERPL-MCNC: 8.8 MG/DL (ref 8.3–10.1)
CALCIUM SERPL-MCNC: 7.5 MG/DL (ref 8.4–10.2)
CARDIAC TROPONIN I PNL SERPL HS: 19 NG/L
CARDIAC TROPONIN I PNL SERPL HS: 21 NG/L
CARDIAC TROPONIN I PNL SERPL HS: 21 NG/L
CHLORIDE SERPL-SCNC: 111 MMOL/L (ref 96–108)
CO2 SERPL-SCNC: 25 MMOL/L (ref 21–32)
CREAT SERPL-MCNC: 0.46 MG/DL (ref 0.6–1.3)
EOSINOPHIL # BLD MANUAL: 0.55 THOUSAND/UL (ref 0–0.4)
EOSINOPHIL NFR BLD MANUAL: 2 % (ref 0–6)
ERYTHROCYTE [DISTWIDTH] IN BLOOD BY AUTOMATED COUNT: 17.2 % (ref 11.6–15.1)
FERRITIN SERPL-MCNC: 910 NG/ML (ref 8–388)
GFR SERPL CREATININE-BSD FRML MDRD: 107 ML/MIN/1.73SQ M
GLUCOSE SERPL-MCNC: 130 MG/DL (ref 65–140)
GLUCOSE SERPL-MCNC: 140 MG/DL (ref 65–140)
GLUCOSE SERPL-MCNC: 160 MG/DL (ref 65–140)
GLUCOSE SERPL-MCNC: 180 MG/DL (ref 65–140)
GLUCOSE SERPL-MCNC: 198 MG/DL (ref 65–140)
GLUCOSE SERPL-MCNC: 201 MG/DL (ref 65–140)
GRAM STN SPEC: ABNORMAL
HCT VFR BLD AUTO: 26.2 % (ref 34.8–46.1)
HGB BLD-MCNC: 8.6 G/DL (ref 11.5–15.4)
INR PPP: 1.31 (ref 0.84–1.19)
IRON SATN MFR SERPL: 25 % (ref 15–50)
IRON SERPL-MCNC: 34 UG/DL (ref 50–170)
LYMPHOCYTES # BLD AUTO: 10 % (ref 14–44)
LYMPHOCYTES # BLD AUTO: 2.76 THOUSAND/UL (ref 0.6–4.47)
MAGNESIUM SERPL-MCNC: 1.7 MG/DL (ref 1.9–2.7)
MCH RBC QN AUTO: 27.8 PG (ref 26.8–34.3)
MCHC RBC AUTO-ENTMCNC: 32.8 G/DL (ref 31.4–37.4)
MCV RBC AUTO: 85 FL (ref 82–98)
MONOCYTES # BLD AUTO: 1.1 THOUSAND/UL (ref 0–1.22)
MONOCYTES NFR BLD: 4 % (ref 4–12)
NEUTROPHILS # BLD MANUAL: 23.17 THOUSAND/UL (ref 1.85–7.62)
NEUTS SEG NFR BLD AUTO: 84 % (ref 43–75)
P AXIS: 54 DEGREES
PHOSPHATE SERPL-MCNC: 3 MG/DL (ref 2.3–4.1)
PLATELET # BLD AUTO: 495 THOUSANDS/UL (ref 149–390)
PLATELET BLD QL SMEAR: ABNORMAL
PMV BLD AUTO: 8.2 FL (ref 8.9–12.7)
POLYCHROMASIA BLD QL SMEAR: PRESENT
POTASSIUM SERPL-SCNC: 3.6 MMOL/L (ref 3.5–5.3)
PR INTERVAL: 113 MS
PROT SERPL-MCNC: 5.3 G/DL (ref 6.4–8.4)
PROTHROMBIN TIME: 16.3 SECONDS (ref 11.6–14.5)
QRS AXIS: 29 DEGREES
QRSD INTERVAL: 83 MS
QT INTERVAL: 404 MS
QTC INTERVAL: 506 MS
RBC # BLD AUTO: 3.09 MILLION/UL (ref 3.81–5.12)
RBC MORPH BLD: PRESENT
SODIUM SERPL-SCNC: 145 MMOL/L (ref 135–147)
T WAVE AXIS: 80 DEGREES
TIBC SERPL-MCNC: 138 UG/DL (ref 250–450)
VENTRICULAR RATE: 94 BPM
VIT B12 SERPL-MCNC: 625 PG/ML (ref 100–900)
WBC # BLD AUTO: 27.58 THOUSAND/UL (ref 4.31–10.16)

## 2023-01-24 RX ORDER — INSULIN LISPRO 100 [IU]/ML
2-12 INJECTION, SOLUTION INTRAVENOUS; SUBCUTANEOUS EVERY 6 HOURS SCHEDULED
Status: DISCONTINUED | OUTPATIENT
Start: 2023-01-24 | End: 2023-02-02

## 2023-01-24 RX ORDER — HYDRALAZINE HYDROCHLORIDE 25 MG/1
25 TABLET, FILM COATED ORAL EVERY 8 HOURS SCHEDULED
Status: DISCONTINUED | OUTPATIENT
Start: 2023-01-24 | End: 2023-01-31

## 2023-01-24 RX ORDER — LIDOCAINE HYDROCHLORIDE 10 MG/ML
10 INJECTION, SOLUTION EPIDURAL; INFILTRATION; INTRACAUDAL; PERINEURAL ONCE
Status: COMPLETED | OUTPATIENT
Start: 2023-01-24 | End: 2023-01-24

## 2023-01-24 RX ORDER — MAGNESIUM SULFATE HEPTAHYDRATE 40 MG/ML
4 INJECTION, SOLUTION INTRAVENOUS ONCE
Status: COMPLETED | OUTPATIENT
Start: 2023-01-24 | End: 2023-01-24

## 2023-01-24 RX ORDER — HYDRALAZINE HYDROCHLORIDE 20 MG/ML
10 INJECTION INTRAMUSCULAR; INTRAVENOUS ONCE
Status: DISCONTINUED | OUTPATIENT
Start: 2023-01-24 | End: 2023-01-24

## 2023-01-24 RX ORDER — OXYCODONE HCL 5 MG/5 ML
5 SOLUTION, ORAL ORAL EVERY 6 HOURS SCHEDULED
Status: DISCONTINUED | OUTPATIENT
Start: 2023-01-24 | End: 2023-01-28

## 2023-01-24 RX ORDER — INSULIN GLARGINE 100 [IU]/ML
20 INJECTION, SOLUTION SUBCUTANEOUS EVERY MORNING
Status: DISCONTINUED | OUTPATIENT
Start: 2023-01-24 | End: 2023-02-03 | Stop reason: HOSPADM

## 2023-01-24 RX ORDER — HYDRALAZINE HYDROCHLORIDE 20 MG/ML
20 INJECTION INTRAMUSCULAR; INTRAVENOUS EVERY 4 HOURS PRN
Status: DISCONTINUED | OUTPATIENT
Start: 2023-01-24 | End: 2023-02-03 | Stop reason: HOSPADM

## 2023-01-24 RX ORDER — POTASSIUM CHLORIDE 14.9 MG/ML
20 INJECTION INTRAVENOUS ONCE
Status: COMPLETED | OUTPATIENT
Start: 2023-01-24 | End: 2023-01-24

## 2023-01-24 RX ORDER — WARFARIN SODIUM 2 MG/1
4 TABLET ORAL
Status: DISCONTINUED | OUTPATIENT
Start: 2023-01-24 | End: 2023-01-25

## 2023-01-24 RX ORDER — LANOLIN ALCOHOL/MO/W.PET/CERES
100 CREAM (GRAM) TOPICAL DAILY
Status: DISCONTINUED | OUTPATIENT
Start: 2023-01-25 | End: 2023-02-03 | Stop reason: HOSPADM

## 2023-01-24 RX ORDER — OXYCODONE HCL 5 MG/5 ML
5 SOLUTION, ORAL ORAL EVERY 4 HOURS PRN
Status: DISCONTINUED | OUTPATIENT
Start: 2023-01-24 | End: 2023-01-25

## 2023-01-24 RX ORDER — POTASSIUM CHLORIDE 20MEQ/15ML
40 LIQUID (ML) ORAL ONCE
Status: COMPLETED | OUTPATIENT
Start: 2023-01-24 | End: 2023-01-24

## 2023-01-24 RX ADMIN — METOPROLOL TARTRATE 25 MG: 25 TABLET, FILM COATED ORAL at 08:07

## 2023-01-24 RX ADMIN — BUPROPION HYDROCHLORIDE 75 MG: 75 TABLET, FILM COATED ORAL at 08:07

## 2023-01-24 RX ADMIN — HYDRALAZINE HYDROCHLORIDE 25 MG: 25 TABLET, FILM COATED ORAL at 20:31

## 2023-01-24 RX ADMIN — AMPICILLIN SODIUM 2000 MG: 2 INJECTION, POWDER, FOR SOLUTION INTRAMUSCULAR; INTRAVENOUS at 11:09

## 2023-01-24 RX ADMIN — AMIODARONE HYDROCHLORIDE 400 MG: 200 TABLET ORAL at 08:07

## 2023-01-24 RX ADMIN — INSULIN LISPRO 1 UNITS: 100 INJECTION, SOLUTION INTRAVENOUS; SUBCUTANEOUS at 00:10

## 2023-01-24 RX ADMIN — OXYCODONE HYDROCHLORIDE 5 MG: 5 SOLUTION ORAL at 11:12

## 2023-01-24 RX ADMIN — LISINOPRIL 40 MG: 20 TABLET ORAL at 08:07

## 2023-01-24 RX ADMIN — INSULIN LISPRO 2 UNITS: 100 INJECTION, SOLUTION INTRAVENOUS; SUBCUTANEOUS at 12:38

## 2023-01-24 RX ADMIN — HYDRALAZINE HYDROCHLORIDE 10 MG: 10 TABLET, FILM COATED ORAL at 05:38

## 2023-01-24 RX ADMIN — INSULIN LISPRO 1 UNITS: 100 INJECTION, SOLUTION INTRAVENOUS; SUBCUTANEOUS at 08:42

## 2023-01-24 RX ADMIN — AMPICILLIN SODIUM 2000 MG: 2 INJECTION, POWDER, FOR SOLUTION INTRAMUSCULAR; INTRAVENOUS at 05:38

## 2023-01-24 RX ADMIN — LIDOCAINE HYDROCHLORIDE 10 ML: 10 INJECTION, SOLUTION EPIDURAL; INFILTRATION; INTRACAUDAL at 17:08

## 2023-01-24 RX ADMIN — OXYCODONE HYDROCHLORIDE 5 MG: 5 SOLUTION ORAL at 17:40

## 2023-01-24 RX ADMIN — MEROPENEM 2000 MG: 1 INJECTION, POWDER, FOR SOLUTION INTRAVENOUS at 05:37

## 2023-01-24 RX ADMIN — POLYETHYLENE GLYCOL 3350 17 G: 17 POWDER, FOR SOLUTION ORAL at 08:07

## 2023-01-24 RX ADMIN — WARFARIN SODIUM 4 MG: 2 TABLET ORAL at 18:50

## 2023-01-24 RX ADMIN — POTASSIUM CHLORIDE 20 MEQ: 14.9 INJECTION, SOLUTION INTRAVENOUS at 08:37

## 2023-01-24 RX ADMIN — AMPICILLIN SODIUM 2000 MG: 2 INJECTION, POWDER, FOR SOLUTION INTRAMUSCULAR; INTRAVENOUS at 17:40

## 2023-01-24 RX ADMIN — HYDRALAZINE HYDROCHLORIDE 20 MG: 20 INJECTION, SOLUTION INTRAMUSCULAR; INTRAVENOUS at 15:45

## 2023-01-24 RX ADMIN — AMPICILLIN SODIUM 2000 MG: 2 INJECTION, POWDER, FOR SOLUTION INTRAMUSCULAR; INTRAVENOUS at 02:22

## 2023-01-24 RX ADMIN — SENNOSIDES 8.6 MG: 8.6 TABLET ORAL at 20:31

## 2023-01-24 RX ADMIN — HYDRALAZINE HYDROCHLORIDE 20 MG: 20 INJECTION, SOLUTION INTRAMUSCULAR; INTRAVENOUS at 11:10

## 2023-01-24 RX ADMIN — AMLODIPINE BESYLATE 10 MG: 10 TABLET ORAL at 08:07

## 2023-01-24 RX ADMIN — HYDRALAZINE HYDROCHLORIDE 25 MG: 25 TABLET, FILM COATED ORAL at 14:39

## 2023-01-24 RX ADMIN — INSULIN GLARGINE 20 UNITS: 100 INJECTION, SOLUTION SUBCUTANEOUS at 12:36

## 2023-01-24 RX ADMIN — MEROPENEM 2000 MG: 1 INJECTION, POWDER, FOR SOLUTION INTRAVENOUS at 21:24

## 2023-01-24 RX ADMIN — HYDRALAZINE HYDROCHLORIDE 10 MG: 20 INJECTION, SOLUTION INTRAMUSCULAR; INTRAVENOUS at 08:33

## 2023-01-24 RX ADMIN — MEROPENEM 2000 MG: 1 INJECTION, POWDER, FOR SOLUTION INTRAVENOUS at 15:29

## 2023-01-24 RX ADMIN — METOPROLOL TARTRATE 25 MG: 25 TABLET, FILM COATED ORAL at 20:31

## 2023-01-24 RX ADMIN — BUPROPION HYDROCHLORIDE 75 MG: 75 TABLET, FILM COATED ORAL at 17:40

## 2023-01-24 RX ADMIN — SERTRALINE HYDROCHLORIDE 100 MG: 100 TABLET, FILM COATED ORAL at 08:07

## 2023-01-24 RX ADMIN — MAGNESIUM SULFATE HEPTAHYDRATE 4 G: 40 INJECTION, SOLUTION INTRAVENOUS at 11:09

## 2023-01-24 RX ADMIN — HYDRALAZINE HYDROCHLORIDE 10 MG: 20 INJECTION, SOLUTION INTRAMUSCULAR; INTRAVENOUS at 04:00

## 2023-01-24 RX ADMIN — AMIODARONE HYDROCHLORIDE 400 MG: 200 TABLET ORAL at 15:32

## 2023-01-24 RX ADMIN — POTASSIUM CHLORIDE 40 MEQ: 1.5 SOLUTION ORAL at 08:36

## 2023-01-24 RX ADMIN — AMPICILLIN SODIUM 2000 MG: 2 INJECTION, POWDER, FOR SOLUTION INTRAMUSCULAR; INTRAVENOUS at 14:38

## 2023-01-24 RX ADMIN — CLOPIDOGREL BISULFATE 75 MG: 75 TABLET ORAL at 08:07

## 2023-01-24 RX ADMIN — AMPICILLIN SODIUM 2000 MG: 2 INJECTION, POWDER, FOR SOLUTION INTRAMUSCULAR; INTRAVENOUS at 20:31

## 2023-01-24 RX ADMIN — Medication 20 MG: at 08:07

## 2023-01-24 NOTE — CONSULTS
Consultation - Buzz Ma  65 y o   female  ICU 12/ICU 15   MRN: [id number]  Encounter: [id number]    ASSESSMENT:    Patient Active Problem List   Diagnosis   • Esophageal reflux   • DM (diabetes mellitus) type II, controlled, with peripheral vascular disorder ([facility])   • Class 3 severe obesity due to excess calories with serious comorbidity and body mass index (BMI) of 40 0 to 44 9 in adult [facility])   • Depression, recurrent ([facility])   • Anxiety   • Benign essential hypertension   • Stroke [facility])   • Diabetic peripheral neuropathy ([facility])   • Vitamin D deficiency   • Systolic murmur   • Familial combined hyperlipidemia   • Arthritis   • Acute colitis   • Moderate protein-calorie malnutrition ([facility])   • 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   • 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 ([facility])   • Type 2 diabetes mellitus with hyperglycemia, with long-term current use of insulin ([facility])   • Hyperparathyroidism ([address] )   • Epigastric pain   • Wound of lower extremity   • Non healing left heel wound   • Hypertensive urgency   • Hypokalemia   • PAD (peripheral artery disease) ([facility])   • Generalized edema due to fluid overload   • Anemia   • Diabetic ulcer of heel associated with diabetes mellitus due to underlying condition ([address] )   • HIT (heparin-induced thrombocytopenia)   • CVA (cerebral vascular accident) ([address] )   • Diarrhea   • Mild protein-calorie malnutrition ([address] )   • History of CVA (cerebrovascular accident)   • Sepsis due to urinary tract infection ([address] )   • Fall   • Cardiac arrest ([address] )   • Troponin level elevated   • Hypomagnesemia   • Bacteremia   • Forehead laceration   • Osteomyelitis ([facility])   • Altered mental status       Active problems addressed:  S/p recent cardiac arrest (1/18/2023)  Severe PAD  S/p bilateral AKA  Sepsis  Polymicrobial bacteremia  R femoral stump osteomyelitis  Toxic metabolic encephalopathy  Hx of CVA  Goals of care    Consult is for goals of care    PLAN:    1  Goals:   • Patient lacks capacity to make any medical choice at the time of my visit  • Tried to call POA/daughter/Jailyn Varghese at [phone] twice but no   Left Vm that I will try to call again tomorrow  • Per initial ICU goals of care conversation, patients POA/daughter is awaiting input first from vascular surgery re: plans for osteo before deciding on possible hospice  We will await their input and allow them to speak to the POA about risk and benefits of further interventions  • Patient already a level 3  • D/w ICU via TT    Code status: Level 3 - DNAR and DNI   Decisional apparatus:  Patient does not have capacity to make medical decisions on my exam today  If such capacity is lost, patient's substitute decision maker would default to daughter by [place]  Advance Directive / Living Will / POLST:  POA paperwork on file    2  Symptom management:  • Defer to ICU team    Controlled Substance Review    [place]MP or [place]  reviewed: No red flags were identified; safe to proceed with prescription  Umm Braun  Written  Sold  ID  Drug  QTY  Days  Prescriber  RX #  Dispenser  Refill  Daily Dose*  Pymt Type       01/16/2023 01/16/2023   3  Oxycodone Hcl (Ir) 5 Mg Tablet 30 00  5  Fa Radha  [id number]   Gra ([id number])   45 00 MME  [place]     01/12/2023 01/12/2023   3  Oxycontin Er 10 Mg Tablet 20 00  10  Sa Pul  [id number]   Gra ([id number])   30 00 MME  [place]     01/12/2023 01/12/2023   3  Alprazolam 0 25 Mg Tablet 29 00  29  Fa Radha  [id number]   Gra ([id number])   0 50 LME  [place]        I have reviewed the patient's controlled substance dispensing history in the Prescription Drug Monitoring Program in compliance with the [facility] regulations before prescribing any controlled substances  We appreciate the opportunity to participate in this patient's care  We will continue to follow   Please do not hesitate to contact our on-call provider through our clinic answering service at [phone] should you have acute symptom control concerns  IDENTIFICATION:  Inpatient consult to Palliative Care  Consult performed by: Marlon Arrington MD  Consult ordered by: iCndy Velasquez MD        Reason for Consult / Principal Problem: goals of care    HISTORY OF PRESENT ILLNESS:    Tana Rico is a 65 y o  female with DM, PAD, who underwent L AKA (11/23/22) and R AKA (12/1/2022) for non healing LE wounds  Her hospital course was prolonged due to complications 2/2 stroke, HIT, acute hypoxic respiratory failure, and R LE stump skin breakdown at the incision site requiring wound debridement and wound VAC placement (VAC placement 12/16/2022 and VAC change 12/19/22)  She was hospitalized from 10/21 to 1/12/2023, with eventual discharge to acute rehab after medical stabilization  In 1/15/2023, she presented to the ED after a fall at rehab resulting in laceration on the forehead, L eye bruising, and bleeding of the R LE stump  CT imaging was negative for acute trauma, so was sent back to rehab  She then again presented to the hospital on 1/17/2023 because of altered mental status  She was found to have sepsis from possible UTI  She was started on antibiotics  In 1/18/2023, she suffered a VFib arrest with ROSC after 1 defibrillation  She was intubated during the code and had been in the ICU since  Arrest thought to be due to electrolyte abnormalities  POA/daughter decided to make patient level 3 prior to extubation  She was successfully extubated to 3L NC on 1/21/2023  She then grew polymicrobial bacteremia  ID on board and suspicious for other infection source  CT imaging ordered and revealed osteomyelitis of RLE stump  LEON also ordered once more stable  Vascular surgery planning to do bedside I&D with cultures  If culture positive, will plan to return to the OR  Given overall poor prognosis, palliative consulted to establish goals of care       Discussed case with ICU resident, Lauryn Mckinney who already began discussions on comfort cares or hospice cares  But the daughter is awaiting further recommendations from surgery re: osteomyelitis  Saw patient in the ICU  She did not really respond to me when I was in the room  She looks otherwise comfortable  She has obvious bruising on her face  Tried to call daughter 2 times but both times went to   Left  that we will call again tomorrow  Interview and exam limited by: encephalopathy    Review of Systems   Unable to perform ROS: Mental status change       Past Medical History:   Diagnosis Date   • Anxiety    • Depression    • Diabetes ([facility] )    • Diabetes mellitus ([facility] )    • Esophageal reflux     28 APR 2015 RESOLVED   • Hyperlipidemia    • Hypertension    • Low back pain     sciatica   • Pneumonia 2019   • Stroke ([facility] ) 12/2015    small vessel, L frontal corona radiata  Rx asa 81, Lipitor 40, risk modification   • Vitamin D deficiency      Past Surgical History:   Procedure Laterality Date   • AMPUTATION ABOVE KNEE (AKA) Right 12/1/2022    Procedure: (AKA), PSB  BKA;  Surgeon: Jose F Miller DO;  Location: [facility];  Service: Vascular   • ARTERIOGRAM Right 11/7/2022    Procedure: -Right lower extremity angiogram -Right CFA balloon angioplasty with [id number]  [employer]  -Right CFA DCB with 6x40 Bard Lutonix -Right CFA atherectomy with Jetstream and distal embolization protection with 7mm Spider FX -Right SFA/Pop angioplasty with 4x40 Chololate balloon -Right SFA/Pop DCB with 5x150 Bard Lutonix -Right SFA stent with 6x80 W W  [employer] x2 -R   • COLONOSCOPY     • IR AORTAGRAM WITH RUN-OFF  10/31/2022   • IR LOWER EXTREMITY ANGIOGRAM  11/10/2022   • IR LOWER EXTREMITY ANGIOGRAM  11/7/2022   • RI AMPUTATION THIGH THROUGH FEMUR ANY LEVEL Left 11/23/2022    Procedure: (AKA);   Surgeon: Jl Michel DO;  Location: [facility];  Service: Vascular   • RI NEGATIVE PRESSURE WOUND THERAPY DME </= 50 SQ CM Bilateral 2022    Procedure: Right above knee amputation washout; vac change; Left above knee amputation debridement; vac placement;  Surgeon: Stacy Reyes DO;  Location: [facility];  Service: Vascular   • WOUND DEBRIDEMENT Right 2022    Procedure: AKA STUMP WASHOUT, Left AKA Staple removal  application of wound vac to Right AKA;   Surgeon: Jennifer Chávez MD;  Location: [facility];  Service: Vascular     Social History     Socioeconomic History   • Marital status: Single     Spouse name: Not on file   • Number of children: Not on file   • Years of education: Not on file   • Highest education level: Not on file   Occupational History   • Not on file   Tobacco Use   • Smoking status: Former     Types: Cigarettes     Quit date:      Years since quittin 0   • Smokeless tobacco: Never   Vaping Use   • Vaping Use: Never used   Substance and Sexual Activity   • Alcohol use: Never     Comment: OCCASIONAL ALCOHOL USE IN ALL SCRIPTS   • Drug use: No   • Sexual activity: Not on file   Other Topics Concern   • Not on file   Social History Narrative   • Not on file     Social Determinants of Health     Financial Resource Strain: Not on file   Food Insecurity: No Food Insecurity   • Worried About Running Out of Food in the Last Year: Never true   • Ran Out of Food in the Last Year: Never true   Transportation Needs: Not on file   Physical Activity: Not on file   Stress: Not on file   Social Connections: Not on file   Intimate Partner Violence: Not on file   Housing Stability: Low Risk    • Unable to Pay for Housing in the Last Year: No   • Number of Places Lived in the Last Year: 1   • Unstable Housing in the Last Year: No     Family History   Problem Relation Age of Onset   • Diabetes Mother    • Lung cancer Mother    • Cancer Father    • Lung cancer Father    • Hypertension Brother    • Hypertension Family        MEDICATIONS / ALLERGIES:  all current active meds have been reviewed    Allergies Allergen Reactions   • Heparin Other (See Comments)     History of HIT       OBJECTIVE:  /69   Pulse 96   Temp (!) 97 4 °F (36 3 °C) (Axillary)   Resp (!) 25   Ht 5' 4" (1 626 m)   Wt 72 1 kg (159 lb)   SpO2 98%   BMI 27 29 kg/m²   Physical Exam:  Constitutional: Appears frail, acutely and chronically ill looking, not toxic appearing  Comfortable  In no acute physical or emotional distress  Head: Normocephalic and atraumatic  Eyes: EOM are normal  No ocular discharge  No scleral icterus  Neck: No visible adenopathy or masses  Respiratory: Effort normal  No stridor  No respiratory distress  Gastrointestinal: No abdominal distension  Musculoskeletal: No edema  Neurological: Asleep, did not really respond to me after verbal and tactile stimuli   Skin: Dry, no diaphoresis  Pale  Bruising on her facenoted  Psychiatric: Unable to assess fully, but reportedly still encephalopathic     Lab Results: I have personally reviewed pertinent labs  Imaging Studies: I have personally reviewed pertinent reports  EKG, Pathology, and Other Studies: I have personally reviewed pertinent reports  Counseling / Coordination of Care:  Counseling / Coordination of Care  Total floor / unit time spent today 30 minutes  Greater than 50% of total time was spent with the patient and / or family counseling and / or coordination of care  A description of the counseling / coordination of care: discussed with team re: medical updates, discussed hospice care, determined competency, determined goals of care, determined POA, determined social/family support, discussed plans of care, discussed symptom management      David Deal MD  [address] [facility]  [phone]

## 2023-01-24 NOTE — ASSESSMENT & PLAN NOTE
Patient is a verbal, not oriented  Baseline per daughter's patient is verbal, however has tendency to be confused after she had a stroke back in November  Likely secondary to acute infection versus hypertensive encephalopathy  CT scan done on 1/18/2023 was unremarkable     TSH was normal  Pending ammonia, vitamin B12,  In place for accurate monitoring of blood pressure

## 2023-01-24 NOTE — PROGRESS NOTES
[facility]  Progress Note - [address] 1960, 65 y o  female MRN: [id number]  Unit/Bed#: ICU 12 Encounter: [id number]  Primary Care Provider: ALEX Ramey   Date and time admitted to hospital: 1/17/2023  3:48 PM    * Sepsis due to urinary tract infection ([address] )  Assessment & Plan  WBC pending  · POA: Septic  Source likely from osteomyelitis/ Bacteremia  · Unresponsive on 1/18 and without pulse  ROSC obtained following 1mg epinephrine and defibrillation with 200 joules  Arrest may be related to ischemia in the setting of infection  · ID on board managing antibiotics  · Blood cultures show polymicrobial   Enterococcus faecalis, Enterobacter Cloacae  · Blood cultures from 1/23 currently NGTD  · MRSA negative  Afebrile the past 24 hours  Plan:  ID onboard thank you for recommendations  Maintain on IV antibiotics with meropenem and vancomycin per ID  Antibiotic day 7 currently on meropenem/ampicillin (vancomycin discontinued 1/23)  Reached out to surgery to vascular surgery regarding osteomyelitis finding on CT scan, told to keep NPO overnight  Repeat blood cultures negative x 24 hrs  Continue to monitor vitals, trend for any further fevers  Per cardiology, LEON pending after mental status improves    Altered mental status  Assessment & Plan  Patient is a verbal, not oriented  Baseline per daughter's patient is verbal, however has tendency to be confused after she had a stroke back in November  Likely secondary to acute infection versus hypertensive encephalopathy  CT scan done on 1/18/2023 was unremarkable     TSH was normal  Pending ammonia, vitamin B12,  In place for accurate monitoring of blood pressure    Anemia  Assessment & Plan  Lab Results   Component Value Date    HGB 8 6 (L) 01/24/2023    HGB 8 5 (L) 01/23/2023    HGB 7 5 (L) 01/22/2023   Patient had bleeding from central line during coarse of admission needed 2 p RBC  Will get iron panel also      Cardiac arrest [facility])  Assessment & Plan  · The etiology is not entirely known  DDX include primary cardiac event/ arrhythmia given her history of significant vascular disease, PE, Aspiration, electrolyte abnormality, vs multifactorial 2/2 bacteremia  Less likely PE given elevated INR  · Initial EKG s/p cardiac arrest - no overt acute ST segment elevation but there is some evidence of up-sloping of her ST segment suggesting ischemia  · Repeat EKG yesterday unremarkable  Plan:  Cardiology on board, maintain amiodarone per cardiology recommendations  Continue amiodarone    Acute respiratory failure with hypoxia (HCC)-resolved as of 1/23/2023  Assessment & Plan  · Initially intubated s/p cardiac arrest   · Extubated on 1/21   · Currently room air    Plan:   Continue to monitor oxygen saturation, wean as tolerated  HIT (heparin-induced thrombocytopenia)  Assessment & Plan  Lab Results   Component Value Date    INR 1 22 (H) 01/23/2023    INR 2 97 (H) 01/20/2023    INR 4 29 (H) 01/18/2023       History of HIT  Platelets remain stable  On AC at home w/ warfarin, with Goal INR 2-3  Currently holding warfarin 2/2 supratherapeutic INR  Plan: Continue with Plavix and warfarin  Monitor INR  Osteomyelitis [facility])  Assessment & Plan  See plan under sepsis    Bacteremia  Assessment & Plan  Plan noted above under sepsis    Hypomagnesemia  Assessment & Plan  Lab Results   Component Value Date    MG 1 7 (L) 01/24/2023    MG 1 8 (L) 01/23/2023    MG 1 7 (L) 01/22/2023       Will replete 4 g  Plan: Maintain magnesium greater than 2 per cardiology recommendations  Troponin level elevated  Assessment & Plan  · Type II non-MI likely 2/2 demand  · Cardiology following  No immediate ischemic w/u needed at this time   Will need after acute illness  · ECHO 1/18- EF 50-55%    Fall  Assessment & Plan  -pta fall at physical therapy  -facial ecchymosis  -pain and wound care    History of CVA (cerebrovascular accident)  Assessment & Plan  Patient had stroke on 11/14 right hemispheric, with residual left-sided weakness   -no acute findings on ct scan 1/18  -neuro checks    PAD (peripheral artery disease) ([facility] )  Assessment & Plan  S/p L AKA 11/23/22   S/p R AKA, wound debridement 12/1/22   S/p R AKA wound/stump washout and VAC placement 12/16/22   S/p R AKA wound/stump VAC change, L AKA stump washout/packing 12/19/22   Wound Cx 12/19/22: enterobacter cloacae   Vascular Surgery on board  Plan: Continue w/ home plavix  Hypokalemia  Assessment & Plan  Lab Results   Component Value Date    K 3 6 01/24/2023    K 2 8 (L) 01/23/2023    K 3 6 01/22/2023     · We will replete her potassium- our goal will be to maintain the level > 4  · Will repleat with 20 IV and 40 oral    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 [facility])  Assessment & Plan  Lab Results   Component Value Date    HGBA1C 9 8 (H) 10/25/2022       Recent Labs     01/22/23  1150 01/22/23  1801 01/23/23  0018 01/23/23  0616   POCGLU 166* 157* 264* 203*       Blood Sugar Average: Last 72 hrs:  (P) 827 [id number]   Home medications: Lantus 60 at bedtime NovoLog 20 prior to meals  Was previously on insulin GTT  Has since been discontinued  Continue with SSI  Glycemia protocol  Moderate protein-calorie malnutrition ([facility] )  Assessment & Plan  Malnutrition Findings:       BMI Findings: Body mass index is 27 29 kg/m²       -start tube feeds today if stays intubated    Esophageal reflux  Assessment & Plan  protonix IV    Metabolic acidosis-resolved as of 1/24/2023  Assessment & Plan  · In the setting of cardiac arrest, her lactic acid was elevated and now normalized  · We will continue to monitor her electrolytes and acid/base balance    Hyponatremia-resolved as of 1/23/2023  Assessment & Plan  Lab Results   Component Value Date    SODIUM 141 01/23/2023    SODIUM 141 01/22/2023    SODIUM 136 01/21/2023       · Etiology, possibly hypovolemic due to sepsis but was resuscitated and had BP ranges that were on the higher end   · She is on seroquel, zoloft and  lisinopril which can all contribute to SIADH development, particularly the zoloft  · We will continue to monitor her sodium level and hold her zoloft, seroquel and lisinopril for now  ·     Febrile-resolved as of 2023  Assessment & Plan  -Cont tylenol  -id following  -cont meropenem  -will need cathie prior to extubation        ----------------------------------------------------------------------------------------  HPI/24hr events: None    Patient appropriate for transfer out of the ICU today?: No  Disposition: Continue Critical Care   Code Status: Level 3 - DNAR and DNI  ---------------------------------------------------------------------------------------  SUBJECTIVE  Mental status is slightly improving  Speaks very softly  Patient is now verbal   However still not oriented x3  Pressures have improved overnight and patient was febrile yesterday, however this morning patient was febrile with low-grade point  WBC is slightly trending down however still remains elevated  Patient is pending vascular assessment regarding new findings of osteomyelitis      Review of Systems   Unable to perform ROS: Mental status change     Review of systems was unable to be performed secondary to altered mental status   ---------------------------------------------------------------------------------------  OBJECTIVE    Vitals   Vitals:    23 0330 23 0700 23 0704 23 0807   BP:    (!) 199/64   BP Location:       Pulse: 96 100  103   Resp: (!) 27 (!) 26     Temp:   (!) 100 6 °F (38 1 °C)    TempSrc:   Axillary    SpO2: 97% 97%     Weight:       Height:         Temp (24hrs), Av 7 °F (37 1 °C), Min:97 6 °F (36 4 °C), Max:100 6 °F (38 1 °C)  Current: Temperature: (!) 100 6 °F (38 1 °C)  Arterial Line BP: 176/60  Arterial Line MAP (mmHg): 102 mmHg    Respiratory:  SpO2: SpO2: 97 %  Nasal Cannula O2 Flow Rate (L/min): 3 L/min    Invasive/non-invasive ventilation settings   Respiratory    Lab Data (Last 4 hours)    None         O2/Vent Data (Last 4 hours)    None                Physical Exam  Vitals reviewed  Constitutional:       Appearance: She is ill-appearing and diaphoretic  Comments: Asleep   HENT:      Head:      Comments: Stitched laceration on forehead  Bruising under left eye  Eyes:      Conjunctiva/sclera: Conjunctivae normal    Cardiovascular:      Rate and Rhythm: Normal rate and regular rhythm  Heart sounds: Normal heart sounds  No murmur heard  Pulmonary:      Effort: Pulmonary effort is normal  No respiratory distress  Comments: Room air  Abdominal:      Palpations: Abdomen is soft  Comments: Hypoactive bowel sounds   Musculoskeletal:      Comments: Bilateral AKA, dressings in place on the right AKA stump   Skin:     Coloration: Skin is pale               Laboratory and Diagnostics:  Results from last 7 days   Lab Units 01/24/23  0657 01/23/23  0433 01/22/23  0624 01/21/23  0539 01/20/23  2135 01/20/23  1501 01/20/23  0544 01/19/23  0448 01/18/23  1935 01/18/23  0832 01/18/23  0530 01/17/23  1605   WBC Thousand/uL 27 58* 29 57* 20 62* 15 66*  --   --  13 89* 17 24* 14 53* 26 92* 16 42* 16 48*   HEMOGLOBIN g/dL 8 6* 8 5* 7 5* 7 2* 7 7* 6 4* 5 4* 7 2* 7 7* 8 9* 9 6* 9 7*   HEMATOCRIT % 26 2* 26 1* 21 9* 21 3* 22 9* 19 1* 17 5* 22 4* 24 8* 29 0* 30 5* 31 2*   PLATELETS Thousands/uL 495* 449* 289 239  --   --  248 232 253 332 267 269   NEUTROS PCT %  --   --   --  69  --   --   --  81* 83*  --  89* 88*   MONOS PCT %  --   --   --  7  --   --   --  11 10  --  5 6   MONO PCT %  --   --   --   --   --   --   --   --   --  8  --   --      Results from last 7 days   Lab Units 01/24/23  0657 01/23/23  0433 01/22/23  0624 01/21/23  0539 01/20/23  1501 01/20/23  0544 01/19/23  0448 01/18/23  1935 01/18/23  0832   SODIUM mmol/L 145 141 141 136 135 135 136 137 132*   POTASSIUM mmol/L 3 6 2 8* 3 6 3 3* 3 1* 2 9* 3 8 3 3* 3 0*   CHLORIDE mmol/L 111* 109* 110* 105 103 101 103 105 97   CO2 mmol/L 25 23 24 20* 23 22 21 23 16*   ANION GAP mmol/L 9 9 7 11 9 12 12 9 19*   BUN mg/dL 10 12 15 22 24 24 23 22 15   CREATININE mg/dL 0 46* 0 46* 0 51* 0 79 0 84 0 84 0 91 0 97 0 82   CALCIUM mg/dL 7 5* 7 5* 7 2* 7 4* 7 3* 7 3* 7 4* 7 5* 7 1*   GLUCOSE RANDOM mg/dL 201* 172* 144* 84 104 112 112 151* 351*   ALT U/L 12  --  19 28  --  40 51 71* 101*   AST U/L 11*  --  16 29  --  39 47* 89* 174*   ALK PHOS U/L 187*  --  199* 205*  --  170* 121* 134* 132*   ALBUMIN g/dL 2 4*  --  2 3* 2 5*  --  2 3* 2 5* 2 6* 2 7*   TOTAL BILIRUBIN mg/dL 0 42  --  0 47 0 51  --  0 38 0 36 0 37 0 49     Results from last 7 days   Lab Units 01/24/23  0657 01/23/23  0433 01/22/23  0624 01/21/23  0539 01/20/23  0544 01/19/23  1242 01/18/23  0832   MAGNESIUM mg/dL 1 7* 1 8* 1 7* 2 1 2 1 2 4 1 8*   PHOSPHORUS mg/dL 3 0 1 7* 2 6  --  3 9 3 7 5 2*      Results from last 7 days   Lab Units 01/24/23  0657 01/23/23  0433 01/20/23  0905 01/18/23  0530 01/17/23  1605   INR  1 31* 1 22* 2 97* 4 29* 2 96*   PTT seconds  --   --   --   --  57*          Results from last 7 days   Lab Units 01/18/23  1050 01/18/23  0834 01/17/23  1605   LACTIC ACID mmol/L 1 5 6 7* 1 1     ABG:  Results from last 7 days   Lab Units 01/18/23  1036   PH ART  7 421   PCO2 ART mm Hg 29 7*   PO2 ART mm Hg 223 0*   HCO3 ART mmol/L 18 9*   BASE EXC ART mmol/L -4 4   ABG SOURCE  Line, Arterial     VBG:  Results from last 7 days   Lab Units 01/18/23  1036 01/17/23  1605   PH JORDEN   --  7 488*   PCO2 JORDEN mm Hg  --  29 3*   PO2 JORDEN mm Hg  --  54 0*   HCO3 JORDEN mmol/L  --  21 7*   BASE EXC JORDEN mmol/L  --  -0 9   ABG SOURCE  Line, Arterial  --      Results from last 7 days   Lab Units 01/17/23  2116 01/17/23  1605   PROCALCITONIN ng/ml 1 37* 0 27*       Micro  Results from last 7 days   Lab Units 01/21/23  0538 01/19/23  1226 01/19/23  0019 01/17/23  1641 01/17/23  1606 01/17/23  1553   BLOOD CULTURE  No Growth at 48 hrs  No Growth at 48 hrs  No Growth After 4 Days  No Growth After 4 Days  --   --  Enterobacter cloacae*  Enterococcus faecalis* Enterococcus faecalis*   GRAM STAIN RESULT   --   --   --   --  Gram negative rods*  Gram positive cocci in pairs and chains* Gram positive cocci in pairs and chains*   URINE CULTURE   --   --   --  >100,000 cfu/ml Enterococcus faecalis*  <10,000 cfu/ml Citrobacter freundii*  <10,000 cfu/ml Klebsiella variicola*  --   --    MRSA CULTURE ONLY   --   --  No Methicillin Resistant Staphlyococcus aureus (MRSA) isolated  --   --   --        EKG: None recent  Imaging: I have personally reviewed pertinent reports  Intake and Output  I/O       01/22 0701 01/23 0700 01/23 0701 01/24 0700 01/24 0701 01/25 0700    I V  (mL/kg) 132 6 (1 8) 1586 5 (22)     NG/GT  60     IV Piggyback 100 850     Feedings       Total Intake(mL/kg) 232 6 (3 2) 2496 5 (34 6)     Urine (mL/kg/hr) 1300 (0 8) 600 (0 3)     Total Output 1300 600     Net -1067 4 +1896 5                  Height and Weights   Height: 5' 4" (162 6 cm)  IBW (Ideal Body Weight): 54 7 kg  Body mass index is 27 29 kg/m²  Weight (last 2 days)     None            Nutrition       Diet Orders   (From admission, onward)             Start     Ordered    01/24/23 0001  Diet NPO  Diet effective midnight        References:    Nutrtion Support Algorithm Enteral vs  Parenteral   Question Answer Comment   Diet Type NPO    RD to adjust diet per protocol?  Yes        01/23/23 1948                  Active Medications  Scheduled Meds:  Current Facility-Administered Medications   Medication Dose Route Frequency Provider Last Rate   • Acetaminophen  650 mg Oral Q4H PRN Judeth Sandhoff, MD     • amiodarone  400 mg Oral BID With Meals Pauline Goetz DO     • amLODIPine  10 mg Oral Daily ALEX Chavez     • ampicillin  2,000 mg Intravenous Q4H Adry Tracy MD 2,000 mg (01/24/23 2044)   • bisacodyl  10 mg Rectal Daily PRN Malcom Flatness, CRNP     • buPROPion  75 mg Per NG Tube BID Liborio Gandara MD     • clopidogrel  75 mg Oral Daily Wan Alcantara DO     • dexmedetomidine  0 1-0 7 mcg/kg/hr Intravenous Titrated Wan Alcantara DO     • hydrALAZINE  10 mg Intravenous Q4H PRN Wan Alcantara DO     • hydrALAZINE  10 mg Oral [facility] ALEX Lopez     • insulin lispro  1-5 Units Subcutaneous Q6H [facility] Le Babinski, PA-C     • lisinopril  40 mg Oral Daily ALEX Leyva     • magnesium sulfate  4 g Intravenous Once Liborio Gandara MD     • meropenem  2,000 mg Intravenous Q8H Virginia Marti MD 2,000 mg (01/24/23 0537)   • metoprolol tartrate  25 mg Oral Q12H [address], CRNP     • omeprazole (PRILOSEC) suspension 2 mg/mL  20 mg Per NG Tube Daily Liborio Gandara MD     • perflutren lipid microsphere  0 4 mL/min Intravenous Once in imaging ALEX Leyva     • polyethylene glycol  17 g Oral Daily Malcom Flatness, CRNP     • potassium chloride  20 mEq Intravenous Once Liborio Gnadara MD     • potassium chloride  40 mEq Oral Once Liborio Gandara MD     • senna  1 tablet Oral HS Malcom Flatness, CRNP     • sertraline  100 mg Oral Daily Wan Alcantara DO       Continuous Infusions:  dexmedetomidine, 0 1-0 7 mcg/kg/hr      PRN Meds:   Acetaminophen, 650 mg, Q4H PRN  bisacodyl, 10 mg, Daily PRN  hydrALAZINE, 10 mg, Q4H PRN  perflutren lipid microsphere, 0 4 mL/min, Once in imaging        Invasive Devices Review  Invasive Devices     Peripheral Intravenous Line  Duration           Peripheral IV 01/23/23 Left;Ventral (anterior) Hand 1 day    Peripheral IV 01/23/23 Right Forearm <1 day          Arterial Line  Duration           Arterial Line 01/18/23 Radial 5 days          Drain  Duration           NG/OG/Enteral Tube Nasogastric Right nare 1 day    External Urinary Catheter <1 day                Rationale for remaining devices: None  ---------------------------------------------------------------------------------------  Advance Directive and Living Will:      Power of : Yes  POLST:    ---------------------------------------------------------------------------------------  Care Time Delivered:   Documentation per attending      Herlinda Torre MD      Portions of the record may have been created with voice recognition software  Occasional wrong word or "sound a like" substitutions may have occurred due to the inherent limitations of voice recognition software    Read the chart carefully and recognize, using context, where substitutions have occurred

## 2023-01-24 NOTE — PROGRESS NOTES
Progress Note - Infectious Disease   Aram Valerio 65 y o  female MRN: [id number]  Unit/Bed#: ICU 12 Encounter: [id number]      Impression/Plan:  1   Sepsis, POA   Patient met sepsis criteria on presentation   Sources are 2 and 3  Course complicated by 4   Extensive resistance developing on prior cultures   2D echo technically difficult study   Extensive CT imaging without any focal findings initially  Clinical parameters overall improved except for leukocytosis  Ongoing white count possibly drug related but higher suspicion is for uncontrolled focus of infection involving her right stump  Other consideration for endovascular infection  Antibiotics as below  Continue to trend fever curve/vitals  Repeat CBC and CMP tomorrow  Follow-up pending blood cultures  Follow-up vascular surgery evaluations  Additional supportive care as per primary  Additional interventions pending clinical course     2   Polymicrobial bacteremia   Blood cultures have isolated Enterobacter and Enterococcus faecalis  Enterobacter resistant to Ertapenem but no carbapenemase present on confirmatory testing   My suspicion overall remains for infection involving her right AKA stump given prior wound cultures and now findings on CT imaging with contrast   Urinary source considered given isolation of Enterococcus  Consider also endovascular source given 4   2D echo limited  No other obvious ectopic focus of infection on extensive imaging  Repeat cultures are clearing  Suspect local cultures will be of low yield as patient has been on broad antibiotics for a week  Given need for further cardiac imaging and possible intervention on the patient's right stump, it may be ideal to coordinate both procedures for the OR  Discussed with other providers    Continue meropenem for now  Continue ampicillin for now  Continue to trend fever curve/vitals  Repeat CBC and CMP tomorrow  Follow-up repeat blood cultures  Transesophageal echo when possible  Anticipate at minimum 2 weeks of IV antibiotic for this issue  Potential for oral therapy thereafter for bone and joint site infection  Ongoing follow-up by vascular surgery  Additional interventions pending clinical course     3   Complicated urinary tract infection   Patient unable to provide any history at this time  Orlie Friend was grossly abnormal on presentation  Possibly contributed to presentation  Higher suspicion for wound infection  Continue antibiotic as above  Monitor abdominal exam  Catheters as per primary     4   Cardiac arrest   Suspect that this may have been unrelated to the above   Patient with multiple other comorbidities as well   Ongoing care and work-up as per critical care/cardiology  Patient extubated 2023     5   Transaminitis   Acute elevation noted likely in the setting of 4   We will need to monitor LFTs however on meropenem   Improving  Antibiotic as above  Repeat LFTs tomorrow  Monitor abdominal exam     Above plan discussed in detail with critical care service, clinical pharmacist, cardiology attending and vascular advanced practitioner  ID consult service will continue to follow      Antibiotics:  Meropenem 7  Ampicillin 2  Total antibiotic 7    24 Hour events:  Yesterday and overnight notes reviewed and no acute events noted  Subjective:  Patient remains a poor historian at baseline  She seems to interact most with attempted palpation of her right stump  Case discussed with multiple other providers      Objective:  Vitals:  Temp:  [97 6 °F (36 4 °C)-100 6 °F (38 1 °C)] 100 6 °F (38 1 °C)  HR:  [] 100  Resp:  [9-28] 26  BP: (140-182)/(49-69) 182/69  SpO2:  [95 %-98 %] 97 %  Temp (24hrs), Av 6 °F (37 °C), Min:97 6 °F (36 4 °C), Max:100 6 °F (38 1 °C)  Current: Temperature: (!) 100 6 °F (38 1 °C)    Physical Exam:   General Appearance:   Patient is awake and alert, chronically ill-appearing, does not follow commands consistently or provide extensive history  Throat: Oropharynx moist without lesions  Lungs:    Decreased throughout; no wheezes, rhonchi or rales; respirations unlabored on room air   Heart:  RRR; no murmur, rub or gallop appreciated   Abdomen:   Soft, non-tender, non-distended, positive bowel sounds  Extremities: No clubbing, cyanosis mild edema in all of her extremities;    Skin: No new rashes or lesions  No new draining wounds noted  Patient does not allow for manipulation of her right stump and appears to be in pain with movement  Labs, Imaging, & Other studies:   All pertinent labs and imaging studies were personally reviewed as below  Results from last 7 days   Lab Units 01/24/23  0657 01/23/23  0433 01/22/23  0624   WBC Thousand/uL 27 58* 29 57* 20 62*   HEMOGLOBIN g/dL 8 6* 8 5* 7 5*   PLATELETS Thousands/uL 495* 449* 289     Results from last 7 days   Lab Units 01/24/23  0657 01/23/23  0433 01/22/23  0624 01/21/23  0539   POTASSIUM mmol/L 3 6   < > 3 6 3 3*   CHLORIDE mmol/L 111*   < > 110* 105   CO2 mmol/L 25   < > 24 20*   BUN mg/dL 10   < > 15 22   CREATININE mg/dL 0 46*   < > 0 51* 0 79   EGFR ml/min/1 73sq m 107   < > 104 81   CALCIUM mg/dL 7 5*   < > 7 2* 7 4*   AST U/L 11*  --  16 29   ALT U/L 12  --  19 28   ALK PHOS U/L 187*  --  199* 205*    < > = values in this interval not displayed  Results from last 7 days   Lab Units 01/21/23  0538 01/19/23  1226 01/19/23  0019 01/17/23  1641 01/17/23  1606 01/17/23  1553   BLOOD CULTURE  No Growth at 48 hrs  No Growth at 48 hrs  No Growth After 4 Days  No Growth After 4 Days    --   --  Enterobacter cloacae*  Enterococcus faecalis* Enterococcus faecalis*   GRAM STAIN RESULT   --   --   --   --  Gram negative rods*  Gram positive cocci in pairs and chains* Gram positive cocci in pairs and chains*   URINE CULTURE   --   --   --  >100,000 cfu/ml Enterococcus faecalis*  <10,000 cfu/ml Citrobacter freundii*  <10,000 cfu/ml Klebsiella variicola*  --   --    MRSA CULTURE ONLY   --   --  No Methicillin Resistant Staphlyococcus aureus (MRSA) isolated  --   --   --        Lab interpretation/comments: White count remains elevated  Imaging interpretation/comments: No new imaging overnight  Culture data: Subsequent cultures remain without growth

## 2023-01-24 NOTE — PROGRESS NOTES
Progress Note - Cardiology   Amandeep Ro 65 y o  female MRN: [id number]  Unit/Bed#: ICU 12 Encounter: [id number]      Assessment/Recommendations/Discussion:   • Cardiac arrest presumably secondary to electrolyte abnormalities and/or severe sepsis  • Hypertension  • Severe anemia  • Peripheral arterial disease  • History of CVA  • Heparin-induced thrombocytopenia  • Diabetes  • Fall with facial injury and ecchymosis  • Polymicrobial bacteremia    Results from last 7 days   Lab Units 01/18/23  1112   SL CV LV EF  55        PLAN  • Mental status is improving, she is on room air currently, still has NG tube in place  • Able to respond to questions but still rather slow  Would give her another day or 2 to improve and become more awake and alert prior to placing her under anesthesia for LEON  • Eventually will need cardiac cath given cardiac arrest but she is stable at the moment  • Continue with p o  amiodarone 400 twice daily  • Continue Norvasc 10, hydralazine 25Q8, lisinopril 40, metoprolol 25 every 12 for blood pressure  Would consider uptitrating the beta-blocker for better blood pressure control    Subjective:   HPI  She does have pain in multiple areas, is awake today and able to respond to questions but does so very slowly and nonspecifically      Review of Systems: As noted in HPI  Rest of ROS is negative      Vitals:   BP (!) 210/71   Pulse 103   Temp (!) 100 6 °F (38 1 °C) (Axillary)   Resp (!) 26   Ht 5' 4" (1 626 m)   Wt 72 1 kg (159 lb)   SpO2 97%   BMI 27 29 kg/m²   I/O       01/22 0701  01/23 0700 01/23 0701  01/24 0700 01/24 0701  01/25 0700    I V  (mL/kg) 132 6 (1 8) 1586 5 (22)     NG/GT  60     IV Piggyback 100 850     Feedings       Total Intake(mL/kg) 232 6 (3 2) 2496 5 (34 6)     Urine (mL/kg/hr) 1300 (0 8) 600 (0 3)     Total Output 1300 600     Net -1067 4 +1896 5                Weight (last 2 days)     None          Physical Exam   Constitutional: awake, obese female  Head: ecchymosis on her face  Eyes: conjunctivae clear and moist  Sclera anicteric  No xanthelasmas  Pupils equal bilaterally  Extraocular motions are full  Ear nose mouth and throat: ears are symmetrical bilaterally, hearing appears to be equal bilaterally, no nasal discharge or epistaxis, oropharynx is clear with moist mucous membranes, NG tube is in place  Neck: Trachea is midline, neck is supple, no thyromegaly or significant lymphadenopathy, there is full range of motion    Lungs: clear to auscultation bilaterally, no wheezes, no rales, no rhonchi, no accessory muscle use, breathing is nonlabored  Heart: regular rate and rhythm, S1, S2 normal, no murmur, no click, no rub and no gallop, has bilateral lower extremity amputations  Abdomen: Obese, soft, non-tender; bowel sounds normal; no masses, no organomegaly  Skin: She has ecchymosis on her face and bilateral lower extremity amputations    TELEMETRY: No sustained arrhythmia  Lab Results:  Results from last 7 days   Lab Units 01/24/23  0657   WBC Thousand/uL 27 58*   HEMOGLOBIN g/dL 8 6*   HEMATOCRIT % 26 2*   PLATELETS Thousands/uL 495*     Results from last 7 days   Lab Units 01/24/23  0657   POTASSIUM mmol/L 3 6   CHLORIDE mmol/L 111*   CO2 mmol/L 25   BUN mg/dL 10   CREATININE mg/dL 0 46*   CALCIUM mg/dL 7 5*   ALK PHOS U/L 187*   ALT U/L 12   AST U/L 11*     Results from last 7 days   Lab Units 01/24/23  0657   POTASSIUM mmol/L 3 6   CHLORIDE mmol/L 111*   CO2 mmol/L 25   BUN mg/dL 10   CREATININE mg/dL 0 46*   CALCIUM mg/dL 7 5*           Medications:    Current Facility-Administered Medications:   •  Acetaminophen (TYLENOL) oral suspension 650 mg, 650 mg, Oral, Q4H PRN, Rickey Rock MD  •  amiodarone tablet 400 mg, 400 mg, Oral, BID With Meals, Pauline Goetz DO, 400 mg at 01/24/23 0807  •  amLODIPine (NORVASC) tablet 10 mg, 10 mg, Oral, Daily, ALEX Richter, 10 mg at 01/24/23 7514  •  ampicillin (OMNIPEN) 2,000 mg in sodium chloride 0 9 % 100 mL IVPB, 2,000 mg, Intravenous, Q4H, Jane Rodriguez MD, Last Rate: 200 mL/hr at 01/24/23 0538, 2,000 mg at 01/24/23 9348  •  bisacodyl (DULCOLAX) rectal suppository 10 mg, 10 mg, Rectal, Daily PRN, ALEX Pitts  •  buPROPion [facility]) tablet 75 mg, 75 mg, Per NG Tube, BID, Yanelis Perez MD, 75 mg at 01/24/23 3328  •  clopidogrel (PLAVIX) tablet 75 mg, 75 mg, Oral, Daily, Wan Alcantara DO, 75 mg at 01/24/23 0807  •  hydrALAZINE (APRESOLINE) injection 10 mg, 10 mg, Intravenous, Once, Yanelis Perez MD  •  hydrALAZINE (APRESOLINE) injection 20 mg, 20 mg, Intravenous, Q4H PRN, Yanelis Perez MD  •  hydrALAZINE (APRESOLINE) tablet 25 mg, 25 mg, Oral, Q8H [address], Yanelis Perez MD  •  HYDROmorphone (DILAUDID) injection 0 2 mg, 0 2 mg, Intravenous, Q4H PRN, Yanelis Perez MD  •  insulin glargine (LANTUS) subcutaneous injection 20 Units 0 2 mL, 20 Units, Subcutaneous, QAM, Yanelis Perez MD  •  insulin lispro (HumaLOG) 100 units/mL subcutaneous injection 2-12 Units, 2-12 Units, Subcutaneous, Q6H [address] **AND** Fingerstick Glucose (POCT), , , Q6H, Yanelis Perez MD  •  lisinopril (ZESTRIL) tablet 40 mg, 40 mg, Oral, Daily, ALEX Dacosta, 40 mg at 01/24/23 2536  •  magnesium sulfate 4 g/100 mL IVPB (premix) 4 g, 4 g, Intravenous, Once, Yanelis Perez MD  •  meropenem (MERREM) 2,000 mg in sodium chloride 0 9 % 100 mL IVPB, 2,000 mg, Intravenous, Q8H, Jane Rodriguez MD, Last Rate: 33 3 mL/hr at 01/24/23 0537, 2,000 mg at 01/24/23 0537  •  metoprolol tartrate (LOPRESSOR) tablet 25 mg, 25 mg, Oral, Q12H [address], ALEX Dacosta, 25 mg at 01/24/23 8361  •  omeprazole (PRILOSEC) suspension 2 mg/mL, 20 mg, Per NG Tube, Daily, Yanelis Perez MD, 20 mg at 01/24/23 1224  •  oxyCODONE (ROXICODONE) oral solution 5 mg, 5 mg, Oral, Q4H PRN, Yanelis Perez MD  •  oxyCODONE (ROXICODONE) oral solution 5 mg, 5 mg, Oral, Q6H [address], Elida Beltrán MD  •  perflutren lipid microsphere (DEFINITY) injection, 0 4 mL/min, Intravenous, Once in imaging, ALEX Cantu  •  polyethylene glycol (MIRALAX) packet 17 g, 17 g, Oral, Daily, ALEX Isidro, 17 g at 01/24/23 1876  •  senna (SENOKOT) tablet 8 6 mg, 1 tablet, Oral, HS, ALEX Isidro, 8 6 mg at 01/23/23 2345  •  sertraline (ZOLOFT) tablet 100 mg, 100 mg, Oral, Daily, Wan Alcantara DO, 100 mg at 01/24/23 0807  •  warfarin (COUMADIN) tablet 4 mg, 4 mg, Oral, Daily (warfarin), Elida Beltrán MD    Portions of the record may have been created with voice recognition software  Occasional wrong word or "sound a like" substitutions may have occurred due to the inherent limitations of voice recognition software  Read the chart carefully and recognize, using context, where substitutions have occurred      Seema Rae DO, [facility]  1/24/2023 10:59 AM

## 2023-01-24 NOTE — ASSESSMENT & PLAN NOTE
Lab Results   Component Value Date    HGB 8 6 (L) 01/24/2023    HGB 8 5 (L) 01/23/2023    HGB 7 5 (L) 01/22/2023   Patient had bleeding from central line during coarse of admission needed 2 p RBC  Will get iron panel also

## 2023-01-24 NOTE — SPEECH THERAPY NOTE
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 (HCC)    Hypokalemia    PAD (peripheral artery disease) (HCC)    Anemia    HIT (heparin-induced thrombocytopenia)    History of CVA (cerebrovascular accident)    Fall    Cardiac arrest ([address] )    Troponin level elevated    Hypomagnesemia    Bacteremia    Forehead laceration    Osteomyelitis (HCC)    Altered mental status    Past Medical History  Past Medical History:   Diagnosis Date   • Anxiety    • Depression    • Diabetes ([address] )    • Diabetes mellitus ([facility] )    • Esophageal reflux     28 APR 2015 RESOLVED   • Hyperlipidemia    • Hypertension    • Low back pain     sciatica   • Pneumonia 2019   • Stroke ([address] ) 12/2015    small vessel, L frontal corona radiata   Rx asa 81, Lipitor 40, risk modification   • Vitamin D deficiency      Past Surgical History  Past Surgical History:   Procedure Laterality Date   • AMPUTATION ABOVE KNEE (AKA) Right 12/1/2022    Procedure: (AKA), PSB  BKA;  Surgeon: Chandan Mcdermott DO;  [facility];  Service: Vascular   • ARTERIOGRAM Right 11/7/2022    Procedure: -Right lower extremity angiogram -Right CFA balloon angioplasty with [id number]  [employer]  -Right CFA DCB with 6x40 Bard Lutonix -Right CFA atherectomy with Jetstream and distal embolization protection with 7mm Spider FX -Right SFA/Pop angioplasty with 4x40 Chololate balloon -Right SFA/Pop DCB with 5x150 Bard Lutonix -Right SFA stent with [employer] x2 -R   • COLONOSCOPY     • IR AORTAGRAM WITH RUN-OFF  10/31/2022   • IR LOWER EXTREMITY ANGIOGRAM  11/10/2022   • IR LOWER EXTREMITY ANGIOGRAM 11/7/2022   • WY AMPUTATION THIGH THROUGH FEMUR ANY LEVEL Left 11/23/2022    Procedure: (AKA); Surgeon: Hakeem Duff DO;  [facility];  Service: Vascular   • WY NEGATIVE PRESSURE WOUND THERAPY DME </= 50 SQ CM Bilateral 12/19/2022    Procedure: Right above knee amputation washout; vac change; Left above knee amputation debridement; vac placement;  Surgeon: Stacy Reyes DO;  [facility];  Service: Vascular   • WOUND DEBRIDEMENT Right 12/16/2022    Procedure: AKA STUMP WASHOUT, Left AKA Staple removal  application of wound vac to Right AKA; Surgeon: Jennifer Chávez MD;  Location: [facility];  Service: Vascular        Bedside Swallow Evaluation:    Summary:  Spoke w/ cardiology prior  No LEON today  Pt presented w/ reduced alertness but was able to respond to basic questions and make choices  Oral care was completed prior to p o  trials  Patient was trialed with ice chips, small controlled single sips of water by straw, and half teaspoon amounts of applesauce  Patient's preference was water  Tends to hyperextend neck  Propped pillow and a wedge to keep her head upright  Currently edentulous  Very prolonged bolus hold of the applesauce  with time the patient eventually transferred and swallowed without overt signs and symptoms  Lip seal was adequate with spoon and straw  Patient took small controlled straw sips of thin liquid  transfer was prompt to mildly delayed  Patient was cued to try to swallow as quickly as she could  No cough or wet vocal quality with liquids  Laryngeal rise palpated appeared mildly reduced  Cued cough was weak  Without stimulation the patient began to doze off  Current alertness level and overall medical status contributing to swallow function  No overt pharyngeal signs and symptoms with very small amounts p o  Oral stage moderate  Recommendations:  Diet: tf  Allow ice chips and small controlled sips of water for now    Liquid:thin (water only for now  Small single sips when alert)  Meds: Tf for now  Supervision: full  Ice/water must be given by staff  Positioning:Upright  Strategies: prop head upright w/ ice/water  Pt to take PO/Meds only when fully alert and upright  Oral care  Aspiration precautions  Reflux precautions  Therapy Prognosis: seems favorable as alertness improves  Frequency: 2-5x/wk as able and indicated    Consider consult w/:  Rehab  Nutrition    Goal(s):  Dysphagia LTG  -Patient will demonstrate safe and effective oral intake (without overt s/s significant oral/pharyngeal dysphagia including s/s penetration or aspiration) for the highest appropriate diet level  1 Pt will tolerate least restrictive diet w/out s/s aspiration or oral/pharyngeal difficulties  2 Pt will will effectively manipulate/masticate and transfer purees/solids w/out s/s dysphagia/aspiration  3 Pt will tolerate thin liquids w/out s/s aspiration  H&P/Admit info/ pertinent provider notes: (PMH noted above)  Chief Complaint: altered mental status   History of Present Illness:  Arleen Herman is a 65 y o  female with a PMH of peripheral vascular disease, with recent prolonged hospitalization complicated by HIT requiring bilateral AKA, stroke, diabetes who presents with altered mental status and fall from facility  In the emergency department the patient was found to have sepsis suspected due to UTI with patient meeting SIRS criteria with tachycardia, leukocytosis and fever  The patient is unable to provide history due to altered mental status  Fortunately her trauma scans in the emergency department are negative  Special Studies:  CT abd/pelvis 1/20/23  1  Small bilateral pleural effusions and subsegmental atelectasis in the base of the right lower lobe  2   Distended gallbladder with no evidence of acute cholecystitis  3   Findings consistent with fecal impaction and stercoral proctitis    4   No evidence of retroperitoneal hematoma or other intra-abdominal or pelvic hemorrhage  5   Apparent IUD present, unchanged since a CT from 2/20/2022  Ct head neg acute  Ct chest 1/17/23   No evidence of acute trauma in the chest, abdomen or pelvis  Previous MBS:  None  Last seen by speech 11/17/2022  On a regular diet  Patient's goal: water    Did the pt report pain? "all over"  If yes, was nursing notified/was it addressed? Yes  Nurse giving pain meds  Reason for consult:  R/o aspiration  Determine safest and least restrictive diet  Failed nursing dysphagia assessment  Change in mental status  New neuro event  Stroke protocol  H/o neurological disease  respiratory compromise, intub 1/18, extub     Precautions:  Contact  Aspiration  Fall    Food Allergies:  None   Current Diet:  Tube feed  Was placed on hold in case patient went for LEON  She is not going today  Premorbid diet:  Regular   O2 requirement:  None   Social/Prior living    Voice/Speech:  Weak/low volume but no dysphonia  Follows commands:  Inconsistent with basic commands  Cognitive status:  Alert and responds to stim  but dozes off w/ prolonged time lapse       Oral mech exam:  Dentition:edentulous (not in)  Lips (VII):no gross asymmetry  Secretion management: wnl  mouth slightly dry    Esophageal stage:  H/o reflux    Aspiration precautions posted    Results d/w:  Pt, nursing

## 2023-01-24 NOTE — PROGRESS NOTES
[facility]  [facility] 1960, 65 y o  female MRN: [id number]  Unit/Bed#: ICU 12 Encounter: [id number]  Primary Care Provider: ALEX Leiva   Date and time admitted to hospital: 1/17/2023  3:48 PM    Bacteremia  Assessment & Plan  65year old female former smoker w/complicated medical hx including HTN, HLD, uncontrolled type II DM (A1c 9 8), JUSTIN, R hemispheric CVA, bilateral tissue loss s/p multiple vascular interventions, subsequently requiring b/l AKA  Patient now presents with sepsis, bacteremia and UTI with altered mental status/encephalopathy  Patient ultimately suffered Vfib arrest and remains intubated with plan for extubation  Vascular surgery consulted to r/o stump infection as source of bacteremia/sepsis  S/p L AKA 11/23/22 [facility])  S/p R AKA, wound debridement 12/1/22 [facility])  S/p R AKA wound/stump washout and VAC placement 12/16/22 Mara Mccoy  S/p R AKA wound/stump VAC change, L AKA stump washout/packing 12/19/22 ([facility])  Wound Cx 12/19/22: enterobacter cloacae     Blood Cx: +Enterobacter cloacae, +Enterococcus faecalis   Urine Cx: +Enterococcus faecalis, +Citrobacter freundii, +Klebsiella variicola     Hgb 6 4 (5 4)  WBC 13 89  SCr/eGFR 0 84/75  INR 2 97 (4 29)    Recommendations:  - S/p bilateral AKA w/subsequent bilateral revision/wash out  Prior L stump wound cultures grew enterobacter cloacae, which is now growing in blood culture  - On exam, left stump is well healed without erythema  R stump posterior wounds with healthy granulation tissue  Anterior stump scabbing with some bogginess    - CT w/contrast reviewed which suggests femur OM  - Patient with polymicrobial urine and blood cultures  - Patient remains stable  - D/w Dr Butch Pelaez- will plan for bedside I&D overlying scabbed area and plan for cultures   If there is positive cultures, may require return to the OR   - ID on board, appreciate recommendations   - Cardiology following as patient is now s/p arrest w/Vfib  Planning for LEON once stable  - Patient on coumadin and Plavix at baseline  INR noted to be supratherapeutic on admission  No clinical signs of active bleeding however patient remains anemic now s/p PRBC transfusion  Continue to trend Hgb  - Continue medical management and support per ICU team   - D/w Dr Nirali Mcknight & ICU team      Subjective: Patient seen and examined  Unable to respond to questions  Vitals:  BP (!) 187/66   Pulse 103   Temp (!) 97 °F (36 1 °C) (Axillary)   Resp (!) 26   Ht 5' 4" (1 626 m)   Wt 72 1 kg (159 lb)   SpO2 97%   BMI 27 29 kg/m²     I/Os:  I/O last 3 completed shifts: In: 2496 5 [I V :1586 5; NG/GT:60; IV Piggyback:850]  Out: 1600 [Urine:1600]  No intake/output data recorded      Lab Results and Cultures:   CBC with diff: Lab Results   Component Value Date    WBC 27 58 (H) 01/24/2023    HGB 8 6 (L) 01/24/2023    HCT 26 2 (L) 01/24/2023    MCV 85 01/24/2023     (H) 01/24/2023    MCH 27 8 01/24/2023    MCHC 32 8 01/24/2023    RDW 17 2 (H) 01/24/2023    MPV 8 2 (L) 01/24/2023    NRBC 0 01/21/2023   ,   BMP/CMP:  Lab Results   Component Value Date    SODIUM 145 01/24/2023    K 3 6 01/24/2023     (H) 01/24/2023    CO2 25 01/24/2023    BUN 10 01/24/2023    CREATININE 0 46 (L) 01/24/2023    CALCIUM 7 5 (L) 01/24/2023    AST 11 (L) 01/24/2023    ALT 12 01/24/2023    ALKPHOS 187 (H) 01/24/2023    EGFR 107 01/24/2023   ,   Lipid Panel: No results found for: CHOL,   Coags:   Lab Results   Component Value Date    PTT 57 (H) 01/17/2023    INR 1 31 (H) 01/24/2023   ,     Blood Culture:   Lab Results   Component Value Date    BLOODCX No Growth at 48 hrs  01/21/2023    BLOODCX No Growth at 48 hrs  01/21/2023   ,   Urinalysis: Lab Results   Component Value Date    COLORU Yellow 01/17/2023    CLARITYU Cloudy 01/17/2023    SPECGRAV 1 025 01/17/2023    PHUR 6 0 01/17/2023    PHUR 6 5 02/23/2018    LEUKOCYTESUR Small (A) 01/17/2023    NITRITE Negative 01/17/2023    GLUCOSEU Negative 01/17/2023    KETONESU Negative 01/17/2023    BILIRUBINUR Negative 01/17/2023    BLOODU Large (A) 01/17/2023   ,   Urine Culture:   Lab Results   Component Value Date    URINECX >100,000 cfu/ml Enterococcus faecalis (A) 01/17/2023    URINECX <10,000 cfu/ml Citrobacter freundii (A) 01/17/2023    URINECX <10,000 cfu/ml Klebsiella variicola (A) 01/17/2023   ,   Wound Culure: No results found for: WOUNDCULT    Medications:  Current Facility-Administered Medications   Medication Dose Route Frequency   • Acetaminophen (TYLENOL) oral suspension 650 mg  650 mg Oral Q4H PRN   • amiodarone tablet 400 mg  400 mg Oral BID With Meals   • amLODIPine (NORVASC) tablet 10 mg  10 mg Oral Daily   • ampicillin (OMNIPEN) 2,000 mg in sodium chloride 0 9 % 100 mL IVPB  2,000 mg Intravenous Q4H   • bisacodyl (DULCOLAX) rectal suppository 10 mg  10 mg Rectal Daily PRN   • buPROPion (WELLBUTRIN) tablet 75 mg  75 mg Per NG Tube BID   • clopidogrel (PLAVIX) tablet 75 mg  75 mg Oral Daily   • hydrALAZINE (APRESOLINE) injection 20 mg  20 mg Intravenous Q4H PRN   • hydrALAZINE (APRESOLINE) tablet 25 mg  25 mg Oral Q8H [address]   • HYDROmorphone (DILAUDID) injection 0 2 mg  0 2 mg Intravenous Q4H PRN   • insulin glargine (LANTUS) subcutaneous injection 20 Units 0 2 mL  20 Units Subcutaneous QAM   • insulin lispro (HumaLOG) 100 units/mL subcutaneous injection 2-12 Units  2-12 Units Subcutaneous Q6H [address]   • lisinopril (ZESTRIL) tablet 40 mg  40 mg Oral Daily   • magnesium sulfate 4 g/100 mL IVPB (premix) 4 g  4 g Intravenous Once   • meropenem (MERREM) 2,000 mg in sodium chloride 0 9 % 100 mL IVPB  2,000 mg Intravenous Q8H   • metoprolol tartrate (LOPRESSOR) tablet 25 mg  25 mg Oral Q12H MICHELLE   • omeprazole (PRILOSEC) suspension 2 mg/mL  20 mg Per NG Tube Daily   • oxyCODONE (ROXICODONE) oral solution 5 mg  5 mg Oral Q4H PRN   • oxyCODONE (ROXICODONE) oral solution 5 mg  5 mg Oral Q6H MICHELLE   • perflutren lipid microsphere (DEFINITY) injection  0 4 mL/min Intravenous Once in imaging   • polyethylene glycol (MIRALAX) packet 17 g  17 g Oral Daily   • senna (SENOKOT) tablet 8 6 mg  1 tablet Oral HS   • sertraline (ZOLOFT) tablet 100 mg  100 mg Oral Daily   • warfarin (COUMADIN) tablet 4 mg  4 mg Oral Daily (warfarin)       Imaging:  Reviewed  Physical Exam:    General: Alert  Extubated   CV: Regular rate   Respiratory: Normal effort  Abdominal: Soft, non-tender   Extremities: Bilateral AKA stumps  L well healed  R with healthy posterior wounds   Scabbed area overlying anterior aspect with bogginess       Goldy Beat, KETURAH  1/24/2023

## 2023-01-24 NOTE — ASSESSMENT & PLAN NOTE
65year old female former smoker w/complicated medical hx including HTN, HLD, uncontrolled type II DM (A1c 9 8), JUSTIN, R hemispheric CVA, bilateral tissue loss s/p multiple vascular interventions, subsequently requiring b/l AKA  Patient now presents with sepsis, bacteremia and UTI with altered mental status/encephalopathy  Patient ultimately suffered Vfib arrest and remains intubated with plan for extubation  Vascular surgery consulted to r/o stump infection as source of bacteremia/sepsis  S/p L AKA 11/23/22 [facility])  S/p R AKA, wound debridement 12/1/22 [facility])  S/p R AKA wound/stump washout and VAC placement 12/16/22 [facility])  S/p R AKA wound/stump VAC change, L AKA stump washout/packing 12/19/22 ([facility])  Wound Cx 12/19/22: enterobacter cloacae     Blood Cx: +Enterobacter cloacae, +Enterococcus faecalis   Urine Cx: +Enterococcus faecalis, +Citrobacter freundii, +Klebsiella variicola     Hgb 6 4 (5 4)  WBC 13 89  SCr/eGFR 0 84/75  INR 2 97 (4 29)    Recommendations:  - S/p bilateral AKA w/subsequent bilateral revision/wash out  Prior L stump wound cultures grew enterobacter cloacae, which is now growing in blood culture  - On exam, left stump is well healed without erythema  R stump posterior wounds with healthy granulation tissue  Anterior stump scabbing with some bogginess    - CT w/contrast reviewed which suggests femur OM  - Patient with polymicrobial urine and blood cultures  - Patient remains stable  - D/w Dr Shlomo Fair- will plan for bedside I&D overlying scabbed area and plan for cultures  If there is positive cultures, may require return to the OR   - ID on board, appreciate recommendations   - Cardiology following as patient is now s/p arrest w/Vfib  Planning for LEON once stable  - Patient on coumadin and Plavix at baseline  INR noted to be supratherapeutic on admission  No clinical signs of active bleeding however patient remains anemic now s/p PRBC transfusion   Continue to trend Hgb  - Continue medical management and support per ICU team   - D/w Dr Pato Strange & ICU team

## 2023-01-24 NOTE — TREATMENT PLAN
Treatment Plan Note - Vascular Surgery   Frann Estimable 65 y o  female MRN: [id number]  Unit/Bed#: ICU 12 Encounter: [id number]    CT images reviewed  Right AKA stump with subcutaneous soft tissue density/edema and gas at the distal aspect of the stump, with soft tissue density and gas extending into the medullary space of the   residual femur  No rim-enhancing focal fluid collection  WBC 29 6 from 20 6 from 15 6  Afebrile over the past 24 hours, not requiring pressors  Currently on meropenem and ampicillin, ID following     - No surgical intervention tonight   - NPO at MN, hold tube feeds for possible intervention  - Continue IV antibiotics per ID  - Please reach out to vascular surgery with any specific questions or concerns      Jovanni Driscoll MD  General Surgery   01/23/23

## 2023-01-24 NOTE — PLAN OF CARE
Problem: MOBILITY - ADULT  Goal: Maintain or return to baseline ADL function  Description: INTERVENTIONS:  -  Assess patient's ability to carry out ADLs; assess patient's baseline for ADL function and identify physical deficits which impact ability to perform ADLs (bathing, care of mouth/teeth, toileting, grooming, dressing, etc )  - Assess/evaluate cause of self-care deficits   - Assess range of motion  - Assess patient's mobility; develop plan if impaired  - Assess patient's need for assistive devices and provide as appropriate  - Encourage maximum independence but intervene and supervise when necessary  - Involve family in performance of ADLs  - Assess for home care needs following discharge   - Consider OT consult to assist with ADL evaluation and planning for discharge  - Provide patient education as appropriate  Outcome: Progressing  Goal: Maintains/Returns to pre admission functional level  Description: INTERVENTIONS:  - Perform BMAT or MOVE assessment daily    - Set and communicate daily mobility goal to care team and patient/family/caregiver     - Collaborate with rehabilitation services on mobility goals if consulted  - Record patient progress and toleration of activity level   Outcome: Progressing     Problem: Prexisting or High Potential for Compromised Skin Integrity  Goal: Skin integrity is maintained or improved  Description: INTERVENTIONS:  - Identify patients at risk for skin breakdown  - Assess and monitor skin integrity  - Assess and monitor nutrition and hydration status  - Monitor labs   - Assess for incontinence   - Turn and reposition patient  - Assist with mobility/ambulation  - Relieve pressure over bony prominences  - Avoid friction and shearing  - Provide appropriate hygiene as needed including keeping skin clean and dry  - Evaluate need for skin moisturizer/barrier cream  - Collaborate with interdisciplinary team   - Patient/family teaching  - Consider wound care consult   Outcome: Progressing     Problem: PAIN - ADULT  Goal: Verbalizes/displays adequate comfort level or baseline comfort level  Description: Interventions:  - Encourage patient to monitor pain and request assistance  - Assess pain using appropriate pain scale  - Administer analgesics based on type and severity of pain and evaluate response  - Implement non-pharmacological measures as appropriate and evaluate response  - Consider cultural and social influences on pain and pain management  - Notify physician/advanced practitioner if interventions unsuccessful or patient reports new pain  Outcome: Progressing     Problem: SAFETY ADULT  Goal: Maintain or return to baseline ADL function  Description: INTERVENTIONS:  -  Assess patient's ability to carry out ADLs; assess patient's baseline for ADL function and identify physical deficits which impact ability to perform ADLs (bathing, care of mouth/teeth, toileting, grooming, dressing, etc )  - Assess/evaluate cause of self-care deficits   - Assess range of motion  - Assess patient's mobility; develop plan if impaired  - Assess patient's need for assistive devices and provide as appropriate  - Encourage maximum independence but intervene and supervise when necessary  - Involve family in performance of ADLs  - Assess for home care needs following discharge   - Consider OT consult to assist with ADL evaluation and planning for discharge  - Provide patient education as appropriate  Outcome: Progressing  Goal: Maintains/Returns to pre admission functional level  Description: INTERVENTIONS:  - Perform BMAT or MOVE assessment daily    - Set and communicate daily mobility goal to care team and patient/family/caregiver     - Collaborate with rehabilitation services on mobility goals if consulted  - Record patient progress and toleration of activity level   Outcome: Progressing  Goal: Patient will remain free of falls  Description: INTERVENTIONS:  - Educate patient/family on patient safety including physical limitations  - Instruct patient to call for assistance with activity   - Consult OT/PT to assist with strengthening/mobility   - Keep Call bell within reach  - Keep bed low and locked with side rails adjusted as appropriate  - Keep care items and personal belongings within reach  - Initiate and maintain comfort rounds  - Make Fall Risk Sign visible to staff  - Apply yellow socks and bracelet for high fall risk patients  - Consider moving patient to room near nurses station  Outcome: Progressing     Problem: DISCHARGE PLANNING  Goal: Discharge to home or other facility with appropriate resources  Description: INTERVENTIONS:  - Identify barriers to discharge w/patient and caregiver  - Arrange for needed discharge resources and transportation as appropriate  - Identify discharge learning needs (meds, wound care, etc )  - Arrange for interpretive services to assist at discharge as needed  - Refer to Case Management Department for coordinating discharge planning if the patient needs post-hospital services based on physician/advanced practitioner order or complex needs related to functional status, cognitive ability, or social support system  Outcome: Progressing     Problem: Knowledge Deficit  Goal: Patient/family/caregiver demonstrates understanding of disease process, treatment plan, medications, and discharge instructions  Description: Complete learning assessment and assess knowledge base  Interventions:  - Provide teaching at level of understanding  - Provide teaching via preferred learning methods  Outcome: Progressing     Problem: Nutrition/Hydration-ADULT  Goal: Nutrient/Hydration intake appropriate for improving, restoring or maintaining nutritional needs  Description: Monitor and assess patient's nutrition/hydration status for malnutrition  Collaborate with interdisciplinary team and initiate plan and interventions as ordered    Monitor patient's weight and dietary intake as ordered or per policy  Utilize nutrition screening tool and intervene as necessary  Determine patient's food preferences and provide high-protein, high-caloric foods as appropriate       INTERVENTIONS:  - Monitor oral intake, urinary output, labs, and treatment plans  - Assess nutrition and hydration status and recommend course of action  - Evaluate amount of meals eaten  - Assist patient with eating if necessary   - Allow adequate time for meals  - Recommend/ encourage appropriate diets, oral nutritional supplements, and vitamin/mineral supplements  - Order, calculate, and assess calorie counts as needed  - Recommend, monitor, and adjust tube feedings and TPN/PPN based on assessed needs  - Assess need for intravenous fluids  - Provide specific nutrition/hydration education as appropriate  - Include patient/family/caregiver in decisions related to nutrition  Outcome: Progressing     Problem: COPING  Goal: Pt/Family able to verbalize concerns and demonstrate effective coping strategies  Description: INTERVENTIONS:  - Assist patient/family to identify coping skills, available support systems and cultural and spiritual values  - Provide emotional support, including active listening and acknowledgement of concerns of patient and caregivers  - Reduce environmental stimuli, as able  - Provide patient education  - Assess for spiritual pain/suffering and initiate spiritual care, including notification of Pastoral Care or chidi based community as needed  - Assess effectiveness of coping strategies  Outcome: Progressing  Goal: Will report anxiety at manageable levels  Description: INTERVENTIONS:  - Administer medication as ordered  - Teach and encourage coping skills  - Provide emotional support  - Assess patient/family for anxiety and ability to cope  Outcome: Progressing

## 2023-01-25 ENCOUNTER — PREP FOR PROCEDURE (OUTPATIENT)
Dept: VASCULAR SURGERY | Facility: CLINIC | Age: 63
End: 2023-01-25

## 2023-01-25 ENCOUNTER — ANESTHESIA EVENT (INPATIENT)
Dept: PERIOP | Facility: HOSPITAL | Age: 63
End: 2023-01-25

## 2023-01-25 DIAGNOSIS — I73.9 PERIPHERAL VASCULAR DISEASE, UNSPECIFIED (HCC): Primary | ICD-10-CM

## 2023-01-25 LAB
ALBUMIN SERPL BCP-MCNC: 2.5 G/DL (ref 3.5–5)
ALP SERPL-CCNC: 174 U/L (ref 34–104)
ALT SERPL W P-5'-P-CCNC: 10 U/L (ref 7–52)
ANION GAP SERPL CALCULATED.3IONS-SCNC: 7 MMOL/L (ref 4–13)
AST SERPL W P-5'-P-CCNC: 16 U/L (ref 13–39)
BILIRUB SERPL-MCNC: 0.34 MG/DL (ref 0.2–1)
BUN SERPL-MCNC: 13 MG/DL (ref 5–25)
CALCIUM ALBUM COR SERPL-MCNC: 8.7 MG/DL (ref 8.3–10.1)
CALCIUM SERPL-MCNC: 7.5 MG/DL (ref 8.4–10.2)
CHLORIDE SERPL-SCNC: 111 MMOL/L (ref 96–108)
CO2 SERPL-SCNC: 24 MMOL/L (ref 21–32)
CREAT SERPL-MCNC: 0.5 MG/DL (ref 0.6–1.3)
ERYTHROCYTE [DISTWIDTH] IN BLOOD BY AUTOMATED COUNT: 17.3 % (ref 11.6–15.1)
GFR SERPL CREATININE-BSD FRML MDRD: 104 ML/MIN/1.73SQ M
GLUCOSE SERPL-MCNC: 188 MG/DL (ref 65–140)
GLUCOSE SERPL-MCNC: 195 MG/DL (ref 65–140)
GLUCOSE SERPL-MCNC: 220 MG/DL (ref 65–140)
GLUCOSE SERPL-MCNC: 271 MG/DL (ref 65–140)
HCT VFR BLD AUTO: 26.7 % (ref 34.8–46.1)
HGB BLD-MCNC: 8.6 G/DL (ref 11.5–15.4)
INR PPP: 1.62 (ref 0.84–1.19)
MAGNESIUM SERPL-MCNC: 2.5 MG/DL (ref 1.9–2.7)
MCH RBC QN AUTO: 28.2 PG (ref 26.8–34.3)
MCHC RBC AUTO-ENTMCNC: 32.2 G/DL (ref 31.4–37.4)
MCV RBC AUTO: 88 FL (ref 82–98)
PLATELET # BLD AUTO: 502 THOUSANDS/UL (ref 149–390)
PMV BLD AUTO: 8.2 FL (ref 8.9–12.7)
POTASSIUM SERPL-SCNC: 3.8 MMOL/L (ref 3.5–5.3)
PROCALCITONIN SERPL-MCNC: 0.09 NG/ML
PROT SERPL-MCNC: 5.6 G/DL (ref 6.4–8.4)
PROTHROMBIN TIME: 19.2 SECONDS (ref 11.6–14.5)
RBC # BLD AUTO: 3.05 MILLION/UL (ref 3.81–5.12)
SODIUM SERPL-SCNC: 142 MMOL/L (ref 135–147)
WBC # BLD AUTO: 20.53 THOUSAND/UL (ref 4.31–10.16)

## 2023-01-25 PROCEDURE — 0J9L0ZZ DRAINAGE OF RIGHT UPPER LEG SUBCUTANEOUS TISSUE AND FASCIA, OPEN APPROACH: ICD-10-PCS | Performed by: SURGERY

## 2023-01-25 RX ORDER — SODIUM CHLORIDE 9 MG/ML
125 INJECTION, SOLUTION INTRAVENOUS CONTINUOUS
Status: CANCELLED | OUTPATIENT
Start: 2023-01-27

## 2023-01-25 RX ORDER — WARFARIN SODIUM 3 MG/1
3 TABLET ORAL
Status: DISCONTINUED | OUTPATIENT
Start: 2023-01-25 | End: 2023-01-28

## 2023-01-25 RX ORDER — POTASSIUM CHLORIDE 20MEQ/15ML
40 LIQUID (ML) ORAL ONCE
Status: COMPLETED | OUTPATIENT
Start: 2023-01-25 | End: 2023-01-25

## 2023-01-25 RX ADMIN — BUPROPION HYDROCHLORIDE 75 MG: 75 TABLET, FILM COATED ORAL at 17:20

## 2023-01-25 RX ADMIN — CLOPIDOGREL BISULFATE 75 MG: 75 TABLET ORAL at 08:09

## 2023-01-25 RX ADMIN — BUPROPION HYDROCHLORIDE 75 MG: 75 TABLET, FILM COATED ORAL at 08:08

## 2023-01-25 RX ADMIN — OXYCODONE HYDROCHLORIDE 5 MG: 5 SOLUTION ORAL at 00:30

## 2023-01-25 RX ADMIN — HYDRALAZINE HYDROCHLORIDE 25 MG: 25 TABLET, FILM COATED ORAL at 05:30

## 2023-01-25 RX ADMIN — AMIODARONE HYDROCHLORIDE 400 MG: 200 TABLET ORAL at 05:33

## 2023-01-25 RX ADMIN — METOPROLOL TARTRATE 25 MG: 25 TABLET, FILM COATED ORAL at 08:08

## 2023-01-25 RX ADMIN — INSULIN LISPRO 4 UNITS: 100 INJECTION, SOLUTION INTRAVENOUS; SUBCUTANEOUS at 05:54

## 2023-01-25 RX ADMIN — AMPICILLIN SODIUM 2000 MG: 2 INJECTION, POWDER, FOR SOLUTION INTRAMUSCULAR; INTRAVENOUS at 23:40

## 2023-01-25 RX ADMIN — AMPICILLIN SODIUM 2000 MG: 2 INJECTION, POWDER, FOR SOLUTION INTRAMUSCULAR; INTRAVENOUS at 14:28

## 2023-01-25 RX ADMIN — MEROPENEM 2000 MG: 1 INJECTION, POWDER, FOR SOLUTION INTRAVENOUS at 14:26

## 2023-01-25 RX ADMIN — POTASSIUM CHLORIDE 40 MEQ: 1.5 SOLUTION ORAL at 08:07

## 2023-01-25 RX ADMIN — SENNOSIDES 8.6 MG: 8.6 TABLET ORAL at 23:10

## 2023-01-25 RX ADMIN — AMPICILLIN SODIUM 2000 MG: 2 INJECTION, POWDER, FOR SOLUTION INTRAMUSCULAR; INTRAVENOUS at 05:30

## 2023-01-25 RX ADMIN — INSULIN LISPRO 6 UNITS: 100 INJECTION, SOLUTION INTRAVENOUS; SUBCUTANEOUS at 17:48

## 2023-01-25 RX ADMIN — OXYCODONE HYDROCHLORIDE 5 MG: 5 SOLUTION ORAL at 17:20

## 2023-01-25 RX ADMIN — WARFARIN SODIUM 3 MG: 3 TABLET ORAL at 17:20

## 2023-01-25 RX ADMIN — AMLODIPINE BESYLATE 10 MG: 10 TABLET ORAL at 08:09

## 2023-01-25 RX ADMIN — HYDRALAZINE HYDROCHLORIDE 20 MG: 20 INJECTION, SOLUTION INTRAMUSCULAR; INTRAVENOUS at 12:20

## 2023-01-25 RX ADMIN — AMPICILLIN SODIUM 2000 MG: 2 INJECTION, POWDER, FOR SOLUTION INTRAMUSCULAR; INTRAVENOUS at 17:20

## 2023-01-25 RX ADMIN — OXYCODONE HYDROCHLORIDE 5 MG: 5 SOLUTION ORAL at 12:00

## 2023-01-25 RX ADMIN — MEROPENEM 2000 MG: 1 INJECTION, POWDER, FOR SOLUTION INTRAVENOUS at 07:22

## 2023-01-25 RX ADMIN — HYDRALAZINE HYDROCHLORIDE 25 MG: 25 TABLET, FILM COATED ORAL at 14:28

## 2023-01-25 RX ADMIN — SERTRALINE HYDROCHLORIDE 100 MG: 100 TABLET, FILM COATED ORAL at 08:08

## 2023-01-25 RX ADMIN — METOPROLOL TARTRATE 25 MG: 25 TABLET, FILM COATED ORAL at 23:10

## 2023-01-25 RX ADMIN — INSULIN LISPRO 2 UNITS: 100 INJECTION, SOLUTION INTRAVENOUS; SUBCUTANEOUS at 12:00

## 2023-01-25 RX ADMIN — Medication 20 MG: at 08:08

## 2023-01-25 RX ADMIN — OXYCODONE HYDROCHLORIDE 5 MG: 5 SOLUTION ORAL at 23:10

## 2023-01-25 RX ADMIN — LISINOPRIL 40 MG: 20 TABLET ORAL at 08:23

## 2023-01-25 RX ADMIN — INSULIN GLARGINE 20 UNITS: 100 INJECTION, SOLUTION SUBCUTANEOUS at 08:23

## 2023-01-25 RX ADMIN — OXYCODONE HYDROCHLORIDE 5 MG: 5 SOLUTION ORAL at 05:30

## 2023-01-25 RX ADMIN — HYDRALAZINE HYDROCHLORIDE 20 MG: 20 INJECTION, SOLUTION INTRAMUSCULAR; INTRAVENOUS at 03:15

## 2023-01-25 RX ADMIN — AMPICILLIN SODIUM 2000 MG: 2 INJECTION, POWDER, FOR SOLUTION INTRAMUSCULAR; INTRAVENOUS at 11:00

## 2023-01-25 RX ADMIN — AMIODARONE HYDROCHLORIDE 400 MG: 200 TABLET ORAL at 17:20

## 2023-01-25 RX ADMIN — THIAMINE HCL TAB 100 MG 100 MG: 100 TAB at 08:08

## 2023-01-25 RX ADMIN — AMPICILLIN SODIUM 2000 MG: 2 INJECTION, POWDER, FOR SOLUTION INTRAMUSCULAR; INTRAVENOUS at 02:05

## 2023-01-25 RX ADMIN — HYDRALAZINE HYDROCHLORIDE 25 MG: 25 TABLET, FILM COATED ORAL at 23:10

## 2023-01-25 NOTE — PROGRESS NOTES
Progress Note - Palliative & Supportive Care  Angeles Daly  65 y o   female  ICU 12/ICU 15   MRN: [id number]  Encounter: [id number]     ASSESSMENT:    Patient Active Problem List   Diagnosis   • Esophageal reflux   • DM (diabetes mellitus) type II, controlled, with peripheral vascular disorder ([facility])   • Class 3 severe obesity due to excess calories with serious comorbidity and body mass index (BMI) of 40 0 to 44 9 in adult [facility])   • Depression, recurrent ([facility])   • Anxiety   • Benign essential hypertension   • Stroke 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 ([facility])   • Type 2 diabetes mellitus with hyperglycemia, with long-term current use of insulin ([facility])   • Hyperparathyroidism ([address] )   • Epigastric pain   • Wound of lower extremity   • Non healing left heel wound   • Hypertensive urgency   • Hypokalemia   • PAD (peripheral artery disease) ([facility])   • Generalized edema due to fluid overload   • Anemia   • Diabetic ulcer of heel associated with diabetes mellitus due to underlying condition ([facility])   • HIT (heparin-induced thrombocytopenia)   • CVA (cerebral vascular accident) ([address] )   • Diarrhea   • Mild protein-calorie malnutrition ([address] )   • History of CVA (cerebrovascular accident)   • Sepsis due to urinary tract infection ([address] )   • Fall   • Cardiac arrest ([address] )   • Troponin level elevated   • Hypomagnesemia   • Bacteremia   • Forehead laceration   • Osteomyelitis ([facility])   • Altered mental status       Active problems addressed:  S/p recent cardiac arrest (1/18/2023)  Severe PAD  S/p bilateral AKA  Sepsis  Polymicrobial bacteremia  R femoral stump osteomyelitis  Toxic metabolic encephalopathy  Hx of CVA  Goals of care    PLAN:    1  Goals:   • Patient more awake today, but unclear if she has the full capacity to make medical choices for herself  But she was adamant that she would not want further surgeries  • Called daughter/LIZZY/Jailyn who placed patient's sister/Danette on the a conference call  I discussed what patient told me today about not wanting further surgeries  We talked about needing to make the decision of:  o A  Continued cares with restoration as goal - this will include cardiac "clearance"/possible cath given recent MI as well as LEON to determine if infection affected the heart, then eventual return back to surgery for more amputation/excision of infected femur bone  However, it doesn't stop with surgery  She will need time to recover and time for wound healing  She already had issues with wound healing in the recent past when they did the surgery initially  Overall, further surgery is not a guaranteed improvement in her overall quality of life or even quantity    o B  Comfort cares on hospice - this is for when they choose instead to let nature takes it course, which will lead to continued and worsening infection leading to sepsis and death, without the definitive management of surgery  Without surgery, antibiotics alone will not be sufficient to control infection or prevent worsening infection  Comfort cares on hospice will instead focus on providing medications for symptoms of progressive disease/infection to allow them a more comfortable way of dying    - 1  Different levels of hospice cares explained  This can be done at home or SNF or at an inpatient hospice  Currently, she looks like she will not qualify for inpatient hospice level of care  Family appears unable to provide cares for her at home, since she lives alone and they have to be at work  If hospice is pursued, they are more interested for hospice at a SNF    - 2   I also discussed starting comfort measures only or level 4 while in the hospital to begin focusing on her comforts right away with no more escalation of cares - including frequent blood draws, imaging, antibiotics, etc    • After much discussion, they are asking for more time to discuss amongst themselves  They appear to be leaning more towards hospice but they want to make sure that hospice can be provided at [facility] or if not there, somewhere else close by  They do not want her to have hospice far away where it will be a difficulty for them to visit with her  • I encouraged them to decide on hospice not because of location/placement as we can work on that more later  Hospice should be decided based on their overall goals for patient and if they still think further treatments are aligned with the patient's personal goals and values  They voiced understanding  • I reached out to CM/Ms Medina to see if hospice at [facility] is possible with her insurance  • I will let primary team/ICU reach out to the family about final decision on goals of care  The family are aware that they will need to make a decision later on today  • Palliative will only follow along peripherally as choices for family were made clear  It is now a matter of them making a final decision that may be influenced by patient's insurance etc  that CM can assist with  Please reach out if further assistance is needed  Dispo per primary team and CM  • D/w Dr Brett Fry and Ms Medina/MARTELL    Code status: Level 3 - DNAR and DNI   Decisional apparatus:  Patient does not have the full capacity to make medical decisions on my exam today  If such capacity is lost, patient's substitute decision maker would default to daughter by [place]  Advance Directive / Living Will / POLST:  None on file    3  Prognosis: guarded    We appreciate the opportunity to participate in this patient's care  We will follow peripherally   Please do not hesitate to contact our on-call provider through our clinic answering service at [phone] should you have acute symptom control concerns  INTERVAL HISTORY:  Chart reviewed  No acute events overnight  Patient is more awake and alert today  She knows she is in the hospital  I discussed to her that she has an infection in her R femur bone that may require another surgery  She immediately said she does not want to have another surgery  We tried to talk to her more about everything else going on with her, but she just stayed quiet and stared the longer we talked  Called her daughter/POA and her sister  Rest of conversation as above  Review of Systems   Constitutional: Positive for activity change and fatigue  HENT: Negative for trouble swallowing  Respiratory: Negative for shortness of breath  Cardiovascular: Negative for chest pain  Gastrointestinal: Negative for abdominal pain  Musculoskeletal:        Achy all over   Neurological: Negative for weakness  Psychiatric/Behavioral: Negative for sleep disturbance  The patient is not nervous/anxious  All other systems reviewed and are negative  MEDICATIONS / ALLERGIES:  all current active meds have been reviewed    Allergies   Allergen Reactions   • Heparin Other (See Comments)     History of HIT       OBJECTIVE:  /78   Pulse 84   Temp 97 9 °F (36 6 °C) (Oral)   Resp 15   Ht 5' 4" (1 626 m)   Wt 72 1 kg (159 lb)   SpO2 98%   BMI 27 29 kg/m²   Physical Exam:    Physical Exam  Constitutional:       General: She is not in acute distress  Appearance: She is ill-appearing  She is not toxic-appearing or diaphoretic  Eyes:      General: No scleral icterus  Right eye: No discharge  Left eye: No discharge  Pulmonary:      Effort: Pulmonary effort is normal  No respiratory distress  Abdominal:      General: Abdomen is flat  Skin:     Coloration: Skin is pale  Skin is not jaundiced  Findings: Bruising present  Neurological:      Mental Status: She is alert        Comments: More awake and interactive today  Knows where she is  But not the details of her hospital stay   Psychiatric:         Mood and Affect: Mood normal          Behavior: Behavior normal          Lab Results:   I have personally reviewed pertinent labs  Imaging Studies: I have personally reviewed pertinent reports  EKG, Pathology, and Other Studies: I have personally reviewed pertinent reports  Counseling / Coordination of Care  Total floor / unit time spent today 60+ minutes  Greater than 50% of total time was spent with the patient and / or family counseling and / or coordination of care  A description of the counseling / coordination of care: provided medical updates, discussed palliative care, discussed hospice cares, determined competency, determined goals of care, determined POA, determined social/family support, discussed plans of care, discussed symptom management, provided psychosocial support      Antonio Sahu MD  [address] and Supportive Care  [phone]

## 2023-01-25 NOTE — PROCEDURES
Procedures       Incision and Drainage    Date/Time: 1/25/2023 8:37 AM  Performed by: Toney Sorenson MD  Authorized by: Toney Sorenson MD   Consent: Verbal consent obtained  Consent given by: power of   Patient understanding: patient states understanding of the procedure being performed  Patient identity confirmed: verbally with patient and arm band  Time out: Immediately prior to procedure a "time out" was called to verify the correct patient, procedure, equipment, support staff and site/side marked as required  Body area: lower extremity  Location details: right AKA stump  Anesthesia: local infiltration    Anesthesia:  Local Anesthetic: lidocaine 1% without epinephrine    Sedation:  Patient sedated: no        Procedure Details:  Preparation: Patient was prepped and draped in the usual sterile fashion  11 blade was used to excise the eschar on the anterolateral aspect of the right stump  A 2cm x 1cm wound was created  Purulent material was immediately draining  Cultures were taken  The wound was digitally probed and the abscess extends to the bone which was able to be palpated  The wound was irrigated with saline and then packed with iodoform and dressed with gauze      The daughter was at bedside and we discussed this will need a formal washout with further bone removal  Will plan for later this week

## 2023-01-25 NOTE — SPEECH THERAPY NOTE
Speech Language/Pathology    Speech/Language Pathology Progress Note    Patient Name: Madi [facility]  [id number] Date: 1/25/2023     Problem List  Principal Problem:    Sepsis due to urinary tract infection 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 ([facility])    Hypokalemia    PAD (peripheral artery disease) ([facility])    Anemia    HIT (heparin-induced thrombocytopenia)    History of CVA (cerebrovascular accident)    Fall    Cardiac arrest ([facility] )    Troponin level elevated    Hypomagnesemia    Bacteremia    Forehead laceration    Osteomyelitis ([facility])    Altered mental status       Past Medical History  Past Medical History:   Diagnosis Date   • Anxiety    • Depression    • Diabetes ([facility] )    • Diabetes mellitus ([facility] )    • Esophageal reflux     28 APR 2015 RESOLVED   • Hyperlipidemia    • Hypertension    • Low back pain     sciatica   • Pneumonia 2019   • Stroke ([facility] ) 12/2015    small vessel, L frontal corona radiata   Rx asa 81, Lipitor 40, risk modification   • Vitamin D deficiency         Past Surgical History  Past Surgical History:   Procedure Laterality Date   • AMPUTATION ABOVE KNEE (AKA) Right 12/1/2022    Procedure: (AKA), PSB  BKA;  Surgeon: Priscila Ward DO;  Location: [facility];  Service: Vascular   • ARTERIOGRAM Right 11/7/2022    Procedure: -Right lower extremity angiogram -Right CFA balloon angioplasty with [id number]  [employer]  -Right CFA DCB with 6x40 Bard Lutonix -Right CFA atherectomy with Jetstream and distal embolization protection with 7mm Spider FX -Right SFA/Pop angioplasty with 4x40 Chololate balloon -Right SFA/Pop DCB with 5x150 Bard Lutonix -Right SFA stent with [employer] x2 -R   • COLONOSCOPY     • IR AORTAGRAM WITH RUN-OFF  10/31/2022   • IR LOWER EXTREMITY ANGIOGRAM  11/10/2022   • IR LOWER EXTREMITY ANGIOGRAM  11/7/2022   • CT AMPUTATION THIGH THROUGH FEMUR ANY LEVEL Left 11/23/2022    Procedure: (AKA); Surgeon: Shad Early DO;  Location: [facility];  Service: Vascular   • CT NEGATIVE PRESSURE WOUND THERAPY DME </= 50 SQ CM Bilateral 12/19/2022    Procedure: Right above knee amputation washout; vac change; Left above knee amputation debridement; vac placement;  Surgeon: Jakob Laws DO;  Location: [facility];  Service: Vascular   • WOUND DEBRIDEMENT Right 12/16/2022    Procedure: AKA STUMP WASHOUT, Left AKA Staple removal  application of wound vac to Right AKA; Surgeon: Toney Sorenson MD;  Location: [facility];  Service: Vascular         Subjective:  Pt much more alert today  Requesting coke soda  Stated "you're daughter did my hair for me today (it was the nurse)  Anand Tapia"  Objective:  Pt seen for diet advancement  Nurse reported pt has been taking sips wnl  Pt was given ~ 6 ounces of cola by straw, no more than 2 consecutive sips  Occasional mildly prolonged oral hold  Prompt swallow  No cough or wet vocal quality  Tolerate pureed by tsp w/ same  Trialed cristino crackers broken in applesauce  (dentures are at home)  manipulated wfl  Trialed pieces of cristino cracker (dry)  sucked on it for a prolonged period of time  Assessment:  Alert and verbal  tolerated thin liquids and purees  not ready for advancement to soft foods yet  Plan/Recommendations:  Advance to dysphagia 1 puree  and thin  Consider turning off tube feed for a day or 2 prior to d/c ing it  Posted aspiration precautions  Please send w/ pt when transferred out of ICU

## 2023-01-25 NOTE — PLAN OF CARE
Problem: INFECTION - ADULT  Goal: Absence or prevention of progression during hospitalization  Description: INTERVENTIONS:  - Assess and monitor for signs and symptoms of infection  - Monitor lab/diagnostic results  - Monitor all insertion sites, i e  indwelling lines, tubes, and drains  - Monitor endotracheal if appropriate and nasal secretions for changes in amount and color  - [place] appropriate cooling/warming therapies per order  - Administer medications as ordered  - Instruct and encourage patient and family to use good hand hygiene technique  - Identify and instruct in appropriate isolation precautions for identified infection/condition  Outcome: Progressing  Goal: Absence of fever/infection during neutropenic period  Description: INTERVENTIONS:  - Monitor WBC    Outcome: Progressing

## 2023-01-25 NOTE — CASE MANAGEMENT
Case Management Discharge Planning Note    Patient name Wayne Pardo  Location ICU 12/ICU 12 MRN [id number]  : 1960 Date 2023       Current Admission Date: 2023  Current Admission Diagnosis:Sepsis due to urinary tract infection [facility])   Patient Active Problem List    Diagnosis Date Noted   • Osteomyelitis ([address] ) 2023   • Altered mental status 2023   • Forehead laceration 2023   • Bacteremia 2023   • Cardiac arrest ([address] ) 2023   • Troponin level elevated 2023   • Hypomagnesemia 2023   • Sepsis due to urinary tract infection ([address] ) 2023   • Fall 2023   • Mild protein-calorie malnutrition ([address] ) 2022   • Diarrhea 2022   • CVA (cerebral vascular accident) ([address] ) 2022   • HIT (heparin-induced thrombocytopenia) 2022   • Diabetic ulcer of heel associated with diabetes mellitus due to underlying condition ([address] ) 11/10/2022   • Anemia 10/30/2022   • Generalized edema due to fluid overload 10/27/2022   • PAD (peripheral artery disease) ([address] ) 10/25/2022   • Non healing left heel wound 10/22/2022   • Hypertensive urgency 10/22/2022   • Hypokalemia 10/22/2022   • Wound of lower extremity 10/21/2022   • Epigastric pain 2022   • Type 2 diabetes mellitus with hyperglycemia, with long-term current use of insulin ([address] ) 10/18/2021   • Hyperparathyroidism ([address] ) 10/18/2021   • 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 ([address] ) 2021   • 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   • Moderate protein-calorie malnutrition ([address] ) 2020   • Acute colitis 2020   • Arthritis 2019   • Depression, recurrent ([address] ) 2018   • Class 3 severe obesity due to excess calories with serious comorbidity and body mass index (BMI) of 40 0 to 44 9 in adult ([facility] ) 2018   • Esophageal reflux 2018   • DM (diabetes mellitus) type II, controlled, with peripheral vascular disorder ([facility] ) 02/23/2018   • Diabetic peripheral neuropathy ([facility] ) 71/54/4571   • Systolic murmur 25/00/2000   • Stroke ([facility] ) 12/14/2015   • History of CVA (cerebrovascular accident) 12/14/2015   • Anxiety 03/19/2015   • Vitamin D deficiency 03/19/2015   • Benign essential hypertension 08/28/2012   • Familial combined hyperlipidemia 08/28/2012      LOS (days): 8  Geometric Mean LOS (GMLOS) (days): 5 00  Days to GMLOS:-2 8     OBJECTIVE:  Risk of Unplanned Readmission Score: 42 52         Current admission status: Inpatient   Preferred Pharmacy:   [facility]/pharmacy #[id number] PalisadeFelix lukema - [address]  [place] [address]  Phone: [phone] Fax: [phone]    Primary Care Provider: ALEX Bland    Primary Insurance: [employer]  Secondary Insurance:     DISCHARGE DETAILS:    Discharge planning discussed with[de-identified] Daughter Ebenezer Barajas - patient deemed to NOT have decision-making capacity by Palliative team  Freedom of Choice: Yes  Comments - Freedom of Choice: Daughter would like patient to return to [facility] if family decides to transition to Hospice - patient's sister radha arrive tomorrow to make formal decision    CM contacted family/caregiver?: Yes  Were Treatment Team discharge recommendations reviewed with patient/caregiver?: Yes  Did patient/caregiver verbalize understanding of patient care needs?: Yes  Were patient/caregiver advised of the risks associated with not following Treatment Team discharge recommendations?: Yes    Contacts  Patient Contacts: Kaleen Krabbe (dtr)  Relationship to Patient[de-identified] Family  Contact Method: Phone  Phone Number: [phone]  Reason/Outcome: Emergency Contact, Discharge Planning, Referral              Other Referral/Resources/Interventions Provided:  Interventions: SNF  Referral Comments: Per daughter's request, CM reached out to [facility] who confirmed they are able to accept patient back as a hospice patient - if returning on hospice services, patient will not need a new auth or not need a new PASSR  Per liaison, they are in-network with  [facility], and [facility] and are able to do w 1-time agreement with any agency other than [facility] [place]  Additional Comments: Daughter stated she will speak with patient's sister tomorrow and come up with a decision on hospice  CM to discuss hospice agency options with daughter tomorrow if they choose to pursue it

## 2023-01-25 NOTE — PROGRESS NOTES
Progress Note - Keyona Stringer 65 y o  female MRN: [id number]    Unit/Bed#: ICU 15 Encounter: [id number]      Assessment:  66 y/o F w h/o b/l AKA, prior CVA, p/w sepsis c/b v fib arrest  Now   w osteo of R AKA s/p I&D of wound on 1/24  Vss  Afebrile  On room air  Tolerating tube feeds  Plan:  Continue local wound care to RLE  appreciate palliative goals of care discussion  Continue abx  Will follow    Subjective:   No acute events overnight  Intermittently confused at times  Objective:     Vitals: Blood pressure 162/75, pulse 88, temperature 97 5 °F (36 4 °C), temperature source Oral, resp  rate 17, height 5' 4" (1 626 m), weight 72 1 kg (159 lb), SpO2 98 %, not currently breastfeeding  ,Body mass index is 27 29 kg/m²  Intake/Output Summary (Last 24 hours) at 1/25/2023 0549  Last data filed at 1/25/2023 0400  Gross per 24 hour   Intake 1460 28 ml   Output 1400 ml   Net 60 28 ml       Physical Exam  General: NAD  HEENT: NC/AT  MMM  Cv: RRR  Lungs: normal effort  Ab: Soft, NT/ND  Ex: no CCE   Neuro: Alert, intermittently confused  Invasive Devices     Peripheral Intravenous Line  Duration           Peripheral IV 01/23/23 Left;Ventral (anterior) Hand 2 days    Peripheral IV 01/23/23 Right Forearm 1 day          Arterial Line  Duration           Arterial Line 01/18/23 Radial 6 days          Drain  Duration           NG/OG/Enteral Tube Nasogastric Right nare 2 days    External Urinary Catheter 1 day                Lab, Imaging and other studies: I have personally reviewed pertinent reports      VTE Pharmacologic Prophylaxis: Warfarin (Coumadin)  VTE Mechanical Prophylaxis: reason for no mechanical VTE prophylaxis b/l aka

## 2023-01-25 NOTE — PROGRESS NOTES
[address]  Progress Note - [address] 1960, 65 y o  female MRN: [id number]  Unit/Bed#: ICU 12 Encounter: [id number]  Primary Care Provider: ALEX Carlton   Date and time admitted to hospital: 1/17/2023  3:48 PM    * Sepsis due to urinary tract infection [facility])  Assessment & Plan  Lab Results   Component Value Date    WBC 20 53 (H) 01/25/2023    WBC 27 58 (H) 01/24/2023    WBC 29 57 (H) 01/23/2023     · POA: Septic  Source likely from osteomyelitis/ Bacteremia  · Unresponsive on 1/18 and without pulse  ROSC obtained following 1mg epinephrine and defibrillation with 200 joules  Arrest may be related to ischemia in the setting of infection  · ID on board managing antibiotics  · Blood cultures show polymicrobial   Enterococcus faecalis, Enterobacter Cloacae  · Blood cultures from 1/23 currently NGTD  · MRSA negative  Afebrile the past 24 hours  Procal negative  Plan:  ID onboard thank you for recommendations  Maintain on IV antibiotics with meropenem and vancomycin per ID  Antibiotic day 8 currently on meropenem/ampicillin (vancomycin discontinued 1/23)  Patient may need OR on right foot stump, POA unsure  Waiting for discussion with palliative  Repeat blood cultures negative to date  Continue to monitor vitals, trend for any further fevers  Per cardiology, LEON possible tomorrow    Altered mental status  Assessment & Plan   Baseline per daughter's patient is verbal, however has tendency to be confused after she had a stroke back in November  Today markedly improved  Patient is oriented x2   Likely secondary to acute infection versus hypertensive encephalopathy  CT scan done on 1/18/2023 was unremarkable     TSH was normal  Pending ammonia, vitamin B12,  In place for accurate monitoring of blood pressure    Anemia  Assessment & Plan  Lab Results   Component Value Date    HGB 8 6 (L) 01/25/2023    HGB 8 6 (L) 01/24/2023    HGB 8 5 (L) 01/23/2023   ferritin was 500   Patient had bleeding from central line during coarse of admission needed 2 p RBC  Continue to monitor  Has been stable        Cardiac arrest [facility])  Assessment & Plan  · The etiology is not entirely known  DDX include primary cardiac event/ arrhythmia given her history of significant vascular disease, PE, Aspiration, electrolyte abnormality, vs multifactorial 2/2 bacteremia  Less likely PE given elevated INR  · Initial EKG s/p cardiac arrest - no overt acute ST segment elevation but there is some evidence of up-sloping of her ST segment suggesting ischemia  · Repeat EKG yesterday unremarkable  Plan:  Cardiology on board, maintain amiodarone per cardiology recommendations  Will need cardiac cath  Continue amiodarone    HIT (heparin-induced thrombocytopenia)  Assessment & Plan  Lab Results   Component Value Date    INR 1 62 (H) 01/25/2023    INR 1 31 (H) 01/24/2023    INR 1 22 (H) 01/23/2023       History of HIT  Platelets remain stable  On AC at home w/ warfarin, with Goal INR 2-3  Plan: Continue with Plavix and warfarin  Monitor INR  Osteomyelitis [facility])  Assessment & Plan  See plan under sepsis    Bacteremia  Assessment & Plan  Plan noted above under sepsis    Hypomagnesemia  Assessment & Plan  Lab Results   Component Value Date    MG 2 5 01/25/2023    MG 1 7 (L) 01/24/2023    MG 1 8 (L) 01/23/2023       Will replete 4 g  Plan: Maintain magnesium greater than 2 per cardiology recommendations  Troponin level elevated  Assessment & Plan  · Type II non-MI likely 2/2 demand  · Cardiology following  No immediate ischemic w/u needed at this time   Will need after acute illness  · ECHO 1/18- EF 50-55%    Fall  Assessment & Plan  -pta fall at physical therapy  -facial ecchymosis  -pain and wound care    History of CVA (cerebrovascular accident)  Assessment & Plan  Patient had stroke on 11/14 right hemispheric, with residual left-sided weakness   -no acute findings on ct scan 1/18  -neuro checks    PAD (peripheral artery disease) ([facility] )  Assessment & Plan  S/p L AKA 11/23/22   S/p R AKA, wound debridement 12/1/22   S/p R AKA wound/stump washout and VAC placement 12/16/22   S/p R AKA wound/stump VAC change, L AKA stump washout/packing 12/19/22   Wound Cx 12/19/22: enterobacter cloacae   Vascular Surgery on board  Plan: Continue w/ home plavix  Hypokalemia  Assessment & Plan  Lab Results   Component Value Date    K 3 8 01/25/2023    K 3 6 01/24/2023    K 2 8 (L) 01/23/2023     · We will replete her potassium- our goal will be to maintain the level > 4  · Will repleat with 40 again today    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 [facility])  Assessment & Plan  Lab Results   Component Value Date    HGBA1C 9 8 (H) 10/25/2022       Recent Labs     01/24/23  1158 01/24/23  1752 01/24/23  2348 01/25/23  0554   POCGLU 160* 130 140 220*       Blood Sugar Average: Last 72 hrs:  (P) 187 [id number]   Home medications: Lantus 60 at bedtime NovoLog 20 prior to meals  Currently on Sliding scale and 20 of lantus  Was previously on insulin GTT  Has since been discontinued  Continue with SSI  Glycemia protocol  Moderate protein-calorie malnutrition ([facility] )  Assessment & Plan  Malnutrition Findings:       BMI Findings: Body mass index is 27 29 kg/m²  -start tube feeds today if stays intubated    Esophageal reflux  Assessment & Plan  protonix IV      ----------------------------------------------------------------------------------------  HPI/24hr events: None    Patient appropriate for transfer out of the ICU today?: No  Disposition: Continue Critical Care   Code Status: Level 3 - DNAR and DNI  ---------------------------------------------------------------------------------------  SUBJECTIVE  Mental status is improving  Speaks very softly  Patient is now oriented x 2 to person and place  Not currently in pain   No shortness of breath or chest pain  No other subjective complaints    Review of Systems  Review of systems was reviewed and negative unless stated above in HPI/24-hour events   ---------------------------------------------------------------------------------------  OBJECTIVE    Vitals   Vitals:    23 0500 23 0600 23 0700 23 0710   BP: 162/75 153/77 145/76    BP Location:       Pulse: 88 84 86    Resp: 17 22 20    Temp:    97 9 °F (36 6 °C)   TempSrc:    Oral   SpO2: 98% 97% 97%    Weight:       Height:         Temp (24hrs), Av 9 °F (36 6 °C), Min:97 °F (36 1 °C), Max:99 1 °F (37 3 °C)  Current: Temperature: 97 9 °F (36 6 °C)  Arterial Line BP: 170/60  Arterial Line MAP (mmHg): 98 mmHg    Respiratory:  SpO2: SpO2: 97 %  Nasal Cannula O2 Flow Rate (L/min): 3 L/min    Invasive/non-invasive ventilation settings   Respiratory    Lab Data (Last 4 hours)    None         O2/Vent Data (Last 4 hours)    None                Physical Exam  Vitals reviewed  Constitutional:       Appearance: She is ill-appearing  She is not diaphoretic  Comments: Asleep   HENT:      Head:      Comments: Stitched laceration on forehead  Bruising under left eye  Eyes:      Conjunctiva/sclera: Conjunctivae normal    Cardiovascular:      Rate and Rhythm: Normal rate and regular rhythm  Heart sounds: Normal heart sounds  No murmur heard  Pulmonary:      Effort: Pulmonary effort is normal  No respiratory distress  Comments: Room air  Abdominal:      Palpations: Abdomen is soft  Comments: Hypoactive bowel sounds   Musculoskeletal:      Comments: Bilateral AKA, dressings in place on the right AKA stump   Skin:     Coloration: Skin is pale  Neurological:      Comments: ortiented x2  Residual left sided weakness on left     Follows commands  Limited peripheral vision on the left side             Laboratory and Diagnostics:  Results from last 7 days   Lab Units 23  0431 23  6243 23  0433 23  1989 01/21/23  0539 01/20/23  2135 01/20/23  1501 01/20/23  0544 01/19/23  0448 01/18/23  1935 01/18/23  0832   WBC Thousand/uL 20 53* 27 58* 29 57* 20 62* 15 66*  --   --  13 89* 17 24* 14 53* 26 92*   HEMOGLOBIN g/dL 8 6* 8 6* 8 5* 7 5* 7 2* 7 7* 6 4* 5 4* 7 2* 7 7* 8 9*   HEMATOCRIT % 26 7* 26 2* 26 1* 21 9* 21 3* 22 9* 19 1* 17 5* 22 4* 24 8* 29 0*   PLATELETS Thousands/uL 502* 495* 449* 289 239  --   --  248 232 253 332   NEUTROS PCT %  --   --   --   --  69  --   --   --  81* 83*  --    MONOS PCT %  --   --   --   --  7  --   --   --  11 10  --    MONO PCT %  --  4  --   --   --   --   --   --   --   --  8     Results from last 7 days   Lab Units 01/25/23  0431 01/24/23  0657 01/23/23  0433 01/22/23  0624 01/21/23  0539 01/20/23  1501 01/20/23  0544 01/19/23 0448 01/18/23  1935   SODIUM mmol/L 142 145 141 141 136 135 135 136 137   POTASSIUM mmol/L 3 8 3 6 2 8* 3 6 3 3* 3 1* 2 9* 3 8 3 3*   CHLORIDE mmol/L 111* 111* 109* 110* 105 103 101 103 105   CO2 mmol/L 24 25 23 24 20* 23 22 21 23   ANION GAP mmol/L 7 9 9 7 11 9 12 12 9   BUN mg/dL 13 10 12 15 22 24 24 23 22   CREATININE mg/dL 0 50* 0 46* 0 46* 0 51* 0 79 0 84 0 84 0 91 0 97   CALCIUM mg/dL 7 5* 7 5* 7 5* 7 2* 7 4* 7 3* 7 3* 7 4* 7 5*   GLUCOSE RANDOM mg/dL 195* 201* 172* 144* 84 104 112 112 151*   ALT U/L 10 12  --  19 28  --  40 51 71*   AST U/L 16 11*  --  16 29  --  39 47* 89*   ALK PHOS U/L 174* 187*  --  199* 205*  --  170* 121* 134*   ALBUMIN g/dL 2 5* 2 4*  --  2 3* 2 5*  --  2 3* 2 5* 2 6*   TOTAL BILIRUBIN mg/dL 0 34 0 42  --  0 47 0 51  --  0 38 0 36 0 37     Results from last 7 days   Lab Units 01/25/23  0431 01/24/23  0657 01/23/23  0433 01/22/23  0624 01/21/23  0539 01/20/23  0544 01/19/23  1242 01/18/23  0832   MAGNESIUM mg/dL 2 5 1 7* 1 8* 1 7* 2 1 2 1 2 4 1 8*   PHOSPHORUS mg/dL  --  3 0 1 7* 2 6  --  3 9 3 7 5 2*      Results from last 7 days   Lab Units 01/25/23  0431 01/24/23  0657 01/23/23  0433 01/20/23  0905   INR  1 62* 1 31* 1 22* 2 97*          Results from last 7 days   Lab Units 01/18/23  1050 01/18/23  0834   LACTIC ACID mmol/L 1 5 6 7*     ABG:  Results from last 7 days   Lab Units 01/18/23  1036   PH ART  7 421   PCO2 ART mm Hg 29 7*   PO2 ART mm Hg 223 0*   HCO3 ART mmol/L 18 9*   BASE EXC ART mmol/L -4 4   ABG SOURCE  Line, Arterial     VBG:  Results from last 7 days   Lab Units 01/18/23  1036   ABG SOURCE  Line, Arterial     Results from last 7 days   Lab Units 01/25/23  0431   PROCALCITONIN ng/ml 0 09       Micro  Results from last 7 days   Lab Units 01/24/23  1703 01/21/23  0538 01/19/23  1226 01/19/23  0019   BLOOD CULTURE   --  No Growth at 72 hrs  No Growth at 72 hrs  No Growth After 5 Days  No Growth After 5 Days  --    GRAM STAIN RESULT  1+ Polys  No bacteria seen  No Polys or Bacteria seen  --   --   --    MRSA CULTURE ONLY   --   --   --  No Methicillin Resistant Staphlyococcus aureus (MRSA) isolated       EKG: None recent  Imaging: I have personally reviewed pertinent reports  Intake and Output  I/O       01/22 0701  01/23 0700 01/23 0701  01/24 0700 01/24 0701  01/25 0700    I V  (mL/kg) 132 6 (1 8) 1586 5 (22)     NG/GT  60     IV Piggyback 100 850     Feedings       Total Intake(mL/kg) 232 6 (3 2) 2496 5 (34 6)     Urine (mL/kg/hr) 1300 (0 8) 600 (0 3)     Total Output 1300 600     Net -1067 4 +1896 5                  Height and Weights   Height: 5' 4" (162 6 cm)  IBW (Ideal Body Weight): 54 7 kg  Body mass index is 27 29 kg/m²  Weight (last 2 days)     None            Nutrition       Diet Orders   (From admission, onward)             Start     Ordered    01/24/23 1619  Diet Enteral/Parenteral; Tube Feeding No Oral Diet; Jevity 1 2 Alireza; Continuous; 60; 150;  Water; Every 6 hours  Diet effective now        References:    Nutrtion Support Algorithm Enteral vs  Parenteral   Question Answer Comment   Diet Type Enteral/Parenteral    Enteral/Parenteral Tube Feeding No Oral Diet    Tube Feeding Formula: Jevity 1 2 Alireza    Bolus/Cyclic/Continuous Continuous    Tube Feeding Goal Rate (mL/hr): 60    Tube Feeding flush (mL): 150    Water Flush type: Water    Water flush frequency: Every 6 hours    RD to adjust diet per protocol?  Yes        01/24/23 1621                  Active Medications  Scheduled Meds:  Current Facility-Administered Medications   Medication Dose Route Frequency Provider Last Rate   • Acetaminophen  650 mg Oral Q4H PRN Lee Curry MD     • amiodarone  400 mg Oral BID With Meals Pauline Goetz DO     • amLODIPine  10 mg Oral Daily ALEX Quintana     • ampicillin  2,000 mg Intravenous Q4H Lelia Guzman MD 2,000 mg (01/25/23 0530)   • bisacodyl  10 mg Rectal Daily PRN ALEX Bailey     • buPROPion  75 mg Per NG Tube BID Lee Curry MD     • clopidogrel  75 mg Oral Daily Wan Alcantara DO     • hydrALAZINE  20 mg Intravenous Q4H PRN Lee Curry MD     • hydrALAZINE  25 mg Oral [facility] Lee Curry MD     • HYDROmorphone  0 2 mg Intravenous Q4H PRN Lee Curry MD     • insulin glargine  20 Units Subcutaneous QA Lee Curry MD     • insulin lispro  2-12 Units Subcutaneous Q6H [facility] Lee Curry MD     • lisinopril  40 mg Oral Daily ALEX Quintana     • meropenem  2,000 mg Intravenous Q8H Lelia Guzman MD 2,000 mg (01/25/23 0025)   • metoprolol tartrate  25 mg Oral Q12H [facility], ALEX     • omeprazole (PRILOSEC) suspension 2 mg/mL  20 mg Per NG Tube Daily Lee Curry MD     • oxyCODONE  5 mg Oral Q4H PRN Lee Curry MD     • oxyCODONE  5 mg Oral Q6H Luc Vail MD     • perflutren lipid microsphere  0 4 mL/min Intravenous Once in imaging ALEX Quintana     • polyethylene glycol  17 g Oral Daily ALEX Bailey     • potassium chloride  40 mEq Oral Once Lee Curry MD     • senna  1 tablet Oral HS ALEX Bailey     • sertraline  100 mg Oral Daily Wan Alcantara DO     • thiamine  100 mg Oral Daily Abdelrahman Hicks MD     • warfarin  4 mg Oral Daily (warfarin) Abdelrahman Hicks MD       Continuous Infusions:     PRN Meds:   Acetaminophen, 650 mg, Q4H PRN  bisacodyl, 10 mg, Daily PRN  hydrALAZINE, 20 mg, Q4H PRN  HYDROmorphone, 0 2 mg, Q4H PRN  oxyCODONE, 5 mg, Q4H PRN  perflutren lipid microsphere, 0 4 mL/min, Once in imaging        Invasive Devices Review  Invasive Devices     Peripheral Intravenous Line  Duration           Peripheral IV 01/23/23 Left;Ventral (anterior) Hand 2 days    Peripheral IV 01/23/23 Right Forearm 1 day          Arterial Line  Duration           Arterial Line 01/18/23 Radial 6 days          Drain  Duration           NG/OG/Enteral Tube Nasogastric Right nare 2 days    External Urinary Catheter 1 day                Rationale for remaining devices: None  ---------------------------------------------------------------------------------------  Advance Directive and Living Will:      Power of : Yes  POLST:    ---------------------------------------------------------------------------------------  Care Time Delivered:   Documentation per attending      Abdelrahman Hicks MD      Portions of the record may have been created with voice recognition software  Occasional wrong word or "sound a like" substitutions may have occurred due to the inherent limitations of voice recognition software    Read the chart carefully and recognize, using context, where substitutions have occurred

## 2023-01-25 NOTE — ASSESSMENT & PLAN NOTE
Lab Results   Component Value Date    MG 2 5 01/25/2023    MG 1 7 (L) 01/24/2023    MG 1 8 (L) 01/23/2023       Will replete 4 g  Plan: Maintain magnesium greater than 2 per cardiology recommendations

## 2023-01-25 NOTE — PROGRESS NOTES
Progress Note - Cardiology   Bronwyn Raman 65 y o  female MRN: [id number]  Unit/Bed#: ICU 12 Encounter: [id number]      Assessment/Recommendations/Discussion:   • Cardiac arrest presumably secondary to electrolyte abnormalities and/or severe sepsis  • Hypertension  • Severe anemia  • Peripheral arterial disease  • History of CVA  • Heparin-induced thrombocytopenia  • Diabetes  • Fall with facial injury and ecchymosis  • Polymicrobial bacteremia    PLAN  • Mental status is improved significantly from yesterday  •   • From cardiac standpoint likely would be okay for LEON tomorrow, however in speaking with her daughter who makes her medical decisions for her, unsure as to the direction where they ultimately will decide to go right now regarding palliative care  We came to the agreement that we will hold off on planning for LEON tomorrow until her daughter comes in in the early morning and discusses with her aunt regarding further medical treatment plans going forward  If agreeable, can likely be add on for LEON on Friday if there is availability with our schedule  •    • Continue with amiodarone, Norvasc, hydralazine, lisinopril, metoprolol consider up titration of metoprolol for better blood pressure and heart rate improvement  • Continue anti-biotics per infectious disease    Subjective:   HPI  Much more awake and interactive today  She has some soreness in her chest, denies shortness of breath  Review of Systems: As noted in HPI  Rest of ROS is negative      Vitals:   /73   Pulse 88   Temp 97 9 °F (36 6 °C) (Oral)   Resp 19   Ht 5' 4" (1 626 m)   Wt 72 1 kg (159 lb)   SpO2 99%   BMI 27 29 kg/m²   I/O       01/23 0701 01/24 0700 01/24 0701  01/25 0700 01/25 0701 01/26 0700    I V  (mL/kg) 1586 5 (22)      NG/GT 60 450 60    IV Piggyback 850 750 3 100    Feedings  340     Total Intake(mL/kg) 2496 5 (34 6) 1540 3 (21 4) 160 (2 2)    Urine (mL/kg/hr) 1100 (0 6) 900 (0 5)     Stool  0     Total Output 1100 900     Net +1396 5 +640 3 +160           Unmeasured Stool Occurrence  1 x 1 x        Weight (last 2 days)     None          Physical Exam   Constitutional: awake, alert obese female  Head: Normocephalic, without obvious abnormality, ecchymosis over her face  Lungs: clear to auscultation bilaterally, no wheezes, no rales, no rhonchi, no accessory muscle use, breathing is nonlabored  Heart: regular rate and rhythm, S1, S2 normal, no murmur, no click, no rub and no gallop, no lower extremity edema  Abdomen: soft, non-tender; bowel sounds normal; no masses, no organomegaly  Skin: Bilateral lower extremity amputations, ecchymosis over her face    TELEMETRY: Sinus rhythm  Lab Results:  Results from last 7 days   Lab Units 01/25/23  0431   WBC Thousand/uL 20 53*   HEMOGLOBIN g/dL 8 6*   HEMATOCRIT % 26 7*   PLATELETS Thousands/uL 502*     Results from last 7 days   Lab Units 01/25/23  0431   POTASSIUM mmol/L 3 8   CHLORIDE mmol/L 111*   CO2 mmol/L 24   BUN mg/dL 13   CREATININE mg/dL 0 50*   CALCIUM mg/dL 7 5*   ALK PHOS U/L 174*   ALT U/L 10   AST U/L 16     Results from last 7 days   Lab Units 01/25/23  0431   POTASSIUM mmol/L 3 8   CHLORIDE mmol/L 111*   CO2 mmol/L 24   BUN mg/dL 13   CREATININE mg/dL 0 50*   CALCIUM mg/dL 7 5*           Medications:    Current Facility-Administered Medications:   •  Acetaminophen (TYLENOL) oral suspension 650 mg, 650 mg, Oral, Q4H PRN, Boby Chisholm MD  •  amiodarone tablet 400 mg, 400 mg, Oral, BID With Meals, Pauline Goetz DO, 400 mg at 01/25/23 0533  •  amLODIPine (NORVASC) tablet 10 mg, 10 mg, Oral, Daily, ALEX Taylor, 10 mg at 01/25/23 0809  •  ampicillin (OMNIPEN) 2,000 mg in sodium chloride 0 9 % 100 mL IVPB, 2,000 mg, Intravenous, Q4H, Ambar Dwyer MD, Last Rate: 200 mL/hr at 01/25/23 0530, 2,000 mg at 01/25/23 0530  •  bisacodyl (DULCOLAX) rectal suppository 10 mg, 10 mg, Rectal, Daily PRN, ALEX Ríos  •  buPROPion [facility]) tablet 75 mg, 75 mg, Per NG Tube, BID, Cindy Velasquez MD, 75 mg at 01/25/23 6182  •  clopidogrel (PLAVIX) tablet 75 mg, 75 mg, Oral, Daily, Wan Alcantara DO, 75 mg at 01/25/23 0809  •  hydrALAZINE (APRESOLINE) injection 20 mg, 20 mg, Intravenous, Q4H PRN, Cindy Velasquez MD, 20 mg at 01/25/23 0315  •  hydrALAZINE (APRESOLINE) tablet 25 mg, 25 mg, Oral, Q8H [facility], Cindy Velasquez MD, 25 mg at 01/25/23 0530  •  HYDROmorphone (DILAUDID) injection 0 2 mg, 0 2 mg, Intravenous, Q4H PRN, Cindy Velasquez MD  •  insulin glargine (LANTUS) subcutaneous injection 20 Units 0 2 mL, 20 Units, Subcutaneous, QAM, Cindy Velasquez MD, 20 Units at 01/25/23 8890  •  insulin lispro (HumaLOG) 100 units/mL subcutaneous injection 2-12 Units, 2-12 Units, Subcutaneous, Q6H [facility], 4 Units at 01/25/23 0554 **AND** Fingerstick Glucose (POCT), , , Q6H, Cindy Velasquez MD  •  lisinopril (ZESTRIL) tablet 40 mg, 40 mg, Oral, Daily, ALEX Inman, 40 mg at 01/25/23 2209  •  meropenem (MERREM) 2,000 mg in sodium chloride 0 9 % 100 mL IVPB, 2,000 mg, Intravenous, Q8H, Kennedy Valerio MD, Last Rate: 33 3 mL/hr at 01/25/23 0722, 2,000 mg at 01/25/23 0785  •  metoprolol tartrate (LOPRESSOR) tablet 25 mg, 25 mg, Oral, Q12H [facility], ALEX Inman, 25 mg at 01/25/23 9422  •  omeprazole (PRILOSEC) suspension 2 mg/mL, 20 mg, Per NG Tube, Daily, Cindy Velasquez MD, 20 mg at 01/25/23 9313  •  oxyCODONE (ROXICODONE) oral solution 5 mg, 5 mg, Oral, Q4H PRN, Cindy Velasquez MD  •  oxyCODONE (ROXICODONE) oral solution 5 mg, 5 mg, Oral, Q6H [facility], Cindy Velasquez MD, 5 mg at 01/25/23 0530  •  perflutren lipid microsphere (DEFINITY) injection, 0 4 mL/min, Intravenous, Once in imaging, ALEX Inman  •  polyethylene glycol (MIRALAX) packet 17 g, 17 g, Oral, Daily, Gillermina Corn, CRNP, 17 g at 01/24/23 4328  •  senna (SENOKOT) tablet 8 6 mg, 1 tablet, Oral, HS, Gillermina Corn, CRNP, 8 6 mg at 01/24/23 2031  •  sertraline (ZOLOFT) tablet 100 mg, 100 mg, Oral, Daily, Wan Alcantara DO, 100 mg at 01/25/23 9808  •  thiamine tablet 100 mg, 100 mg, Oral, Daily, Kitty Gomez MD, 100 mg at 01/25/23 8703  •  warfarin (COUMADIN) tablet 4 mg, 4 mg, Oral, Daily (warfarin), Kitty Gomez MD, 4 mg at 01/24/23 1850    Portions of the record may have been created with voice recognition software  Occasional wrong word or "sound a like" substitutions may have occurred due to the inherent limitations of voice recognition software  Read the chart carefully and recognize, using context, where substitutions have occurred      Wilma Bagley DO, [facility]  1/25/2023 11:35 AM

## 2023-01-25 NOTE — ASSESSMENT & PLAN NOTE
Baseline per daughter's patient is verbal, however has tendency to be confused after she had a stroke back in November  Today markedly improved  Patient is oriented x2   Likely secondary to acute infection versus hypertensive encephalopathy  CT scan done on 1/18/2023 was unremarkable     TSH was normal  Pending ammonia, vitamin B12,  In place for accurate monitoring of blood pressure

## 2023-01-25 NOTE — ASSESSMENT & PLAN NOTE
Lab Results   Component Value Date    HGB 8 6 (L) 01/25/2023    HGB 8 6 (L) 01/24/2023    HGB 8 5 (L) 01/23/2023   ferritin was 500  Patient had bleeding from central line during coarse of admission needed 2 p RBC  Continue to monitor   Has been stable

## 2023-01-25 NOTE — ASSESSMENT & PLAN NOTE
Lab Results   Component Value Date    HGBA1C 9 8 (H) 10/25/2022       Recent Labs     01/24/23  1158 01/24/23  1752 01/24/23  2348 01/25/23  0554   POCGLU 160* 130 140 220*       Blood Sugar Average: Last 72 hrs:  (P) 160 [id number]   Home medications: Lantus 60 at bedtime NovoLog 20 prior to meals  Currently on Sliding scale and 20 of lantus  Was previously on insulin GTT  Has since been discontinued  Continue with SSI  Glycemia protocol

## 2023-01-25 NOTE — ASSESSMENT & PLAN NOTE
· The etiology is not entirely known  DDX include primary cardiac event/ arrhythmia given her history of significant vascular disease, PE, Aspiration, electrolyte abnormality, vs multifactorial 2/2 bacteremia  Less likely PE given elevated INR  · Initial EKG s/p cardiac arrest - no overt acute ST segment elevation but there is some evidence of up-sloping of her ST segment suggesting ischemia  · Repeat EKG yesterday unremarkable  Plan:  Cardiology on board, maintain amiodarone per cardiology recommendations    Will need cardiac cath  Continue amiodarone

## 2023-01-25 NOTE — ASSESSMENT & PLAN NOTE
Lab Results   Component Value Date    K 3 8 01/25/2023    K 3 6 01/24/2023    K 2 8 (L) 01/23/2023     · We will replete her potassium- our goal will be to maintain the level > 4  · Will repleat with 40 again today

## 2023-01-25 NOTE — ASSESSMENT & PLAN NOTE
Lab Results   Component Value Date    INR 1 62 (H) 01/25/2023    INR 1 31 (H) 01/24/2023    INR 1 22 (H) 01/23/2023       History of HIT  Platelets remain stable  On AC at home w/ warfarin, with Goal INR 2-3  Plan: Continue with Plavix and warfarin  Monitor INR

## 2023-01-25 NOTE — ASSESSMENT & PLAN NOTE
Lab Results   Component Value Date    WBC 20 53 (H) 01/25/2023    WBC 27 58 (H) 01/24/2023    WBC 29 57 (H) 01/23/2023     · POA: Septic  Source likely from osteomyelitis/ Bacteremia  · Unresponsive on 1/18 and without pulse  ROSC obtained following 1mg epinephrine and defibrillation with 200 joules  Arrest may be related to ischemia in the setting of infection  · ID on board managing antibiotics  · Blood cultures show polymicrobial   Enterococcus faecalis, Enterobacter Cloacae  · Blood cultures from 1/23 currently NGTD  · MRSA negative  Afebrile the past 24 hours  Procal negative  Plan:  ID onboard thank you for recommendations  Maintain on IV antibiotics with meropenem and vancomycin per ID  Antibiotic day 8 currently on meropenem/ampicillin (vancomycin discontinued 1/23)  Patient may need OR on right foot stump, POA unsure  Waiting for discussion with palliative  Repeat blood cultures negative to date  Continue to monitor vitals, trend for any further fevers    Per cardiology, LEON possible tomorrow

## 2023-01-25 NOTE — PROGRESS NOTES
Progress Note - Infectious Disease   Wayne Day 65 y o  female MRN: [id number]  Unit/Bed#: ICU 12 Encounter: [id number]      Impression/Plan:  1   Sepsis, POA   Patient met sepsis criteria on presentation   Sources are 2 and 3  Course complicated by 4   Extensive resistance developing on prior cultures   2D echo technically difficult study   Extensive CT imaging without any focal findings initially  Clinical parameters overall improved except for leukocytosis  Ongoing white count likely related to the right stump, now downtrending with bedside intervention  Antibiotics as below  Continue to trend fever curve/vitals  Repeat CBC and CMP tomorrow  Follow-up pending blood and wound cultures  Ongoing follow-up by vascular surgery  Ongoing follow-up by cardiology  Additional supportive care as per primary  Goals of care discussions ongoing     2   Polymicrobial bacteremia   Blood cultures have isolated Enterobacter and Enterococcus faecalis  Enterobacter resistant to Ertapenem but no carbapenemase present on confirmatory testing   My suspicion overall remains for infection involving her right AKA stump given prior wound cultures and now findings on CT imaging with contrast   Urinary source considered given isolation of Enterococcus  Consider also endovascular source given 4   2D echo limited  No other obvious ectopic focus of infection on extensive imaging  Repeat cultures cleared  Wound cultures pending from 1/24, likely will be of low yield with prior antibiotic  Potential transesophageal echo tomorrow  Potential OR intervention with vascular on Friday  This is all of course pending goals of care discussions    Continue meropenem for now  Continue ampicillin for now  Continue to trend fever curve/vitals  Repeat CBC and CMP tomorrow  Follow up pending cultures  Transesophageal echo recommended  Anticipate at minimum 2 weeks of IV antibiotic for this issue  Potential for oral therapy thereafter for bone and joint site infection  Ongoing follow-up by vascular surgery  Additional interventions pending clinical course     3   Complicated urinary tract infection   Patient unable to provide any history at this time  Orie Kevin was grossly abnormal on presentation  Possibly contributed to presentation  Higher suspicion for wound infection  Continue antibiotic as above  Monitor abdominal exam  Catheters as per primary     4   Cardiac arrest   Suspect that this may have been unrelated to the above   Patient with multiple other comorbidities as well   Ongoing care and work-up as per critical care/cardiology  Patient extubated 2023     5   Transaminitis   Acute elevation noted likely in the setting of 4   We will need to monitor LFTs however on meropenem   Improving  Antibiotic as above  Repeat LFTs tomorrow  Monitor abdominal exam     Above plan discussed in detail with critical care service, cardiology attending, vascular advanced practitioner and clinical pharmacy  ID consult service will continue to follow      Antibiotics:  Meropenem 8  Ampicillin 3  Total antibiotic 8    24 Hour events:  Yesterday and overnight notes reviewed and patient underwent bedside debridement with vascular surgery with purulent fluid noted from the right stump and cultures collected  Potential plans for the OR on Friday and potentially transesophageal echo tomorrow of course this is all depending on patient's goals of care  Subjective:  Patient will very abruptly open her eyes but is inconsistently interactive  She is not oriented    She is able to tell me she is in the hospital     Objective:  Vitals:  Temp:  [97 °F (36 1 °C)-99 1 °F (37 3 °C)] 97 9 °F (36 6 °C)  HR:  [80-96] 86  Resp:  [16-26] 20  BP: (113-210)/(57-79) 145/76  SpO2:  [97 %-99 %] 97 %  Temp (24hrs), Av 9 °F (36 6 °C), Min:97 °F (36 1 °C), Max:99 1 °F (37 3 °C)  Current: Temperature: 97 9 °F (36 6 °C)    Physical Exam:   General Appearance:   Patient is awake but inconsistently interactive  She is not oriented  Throat: Oropharynx moist without lesions  Lungs:    Upper airway rhonchi throughout, no wheezing or rales   Heart:  RRR; no murmur, rub or gallop appreciated   Abdomen:   Soft, non-tender, non-distended, positive bowel sounds  Extremities: No clubbing, cyanosis; edema in all of her extremities   Skin: No new rashes or lesions  No new draining wounds noted  Surgical site wrapped at this time  Patient again with pain with minimal manipulation  Labs, Imaging, & Other studies:   All pertinent labs and imaging studies were personally reviewed as below  Results from last 7 days   Lab Units 01/25/23  0431 01/24/23  0657 01/23/23  0433   WBC Thousand/uL 20 53* 27 58* 29 57*   HEMOGLOBIN g/dL 8 6* 8 6* 8 5*   PLATELETS Thousands/uL 502* 495* 449*     Results from last 7 days   Lab Units 01/25/23  0431 01/24/23  0657 01/23/23  0433 01/22/23  0624   POTASSIUM mmol/L 3 8 3 6   < > 3 6   CHLORIDE mmol/L 111* 111*   < > 110*   CO2 mmol/L 24 25   < > 24   BUN mg/dL 13 10   < > 15   CREATININE mg/dL 0 50* 0 46*   < > 0 51*   EGFR ml/min/1 73sq m 104 107   < > 104   CALCIUM mg/dL 7 5* 7 5*   < > 7 2*   AST U/L 16 11*  --  16   ALT U/L 10 12  --  19   ALK PHOS U/L 174* 187*  --  199*    < > = values in this interval not displayed  Results from last 7 days   Lab Units 01/24/23  1703 01/21/23  0538 01/19/23  1226 01/19/23  0019   BLOOD CULTURE   --  No Growth at 72 hrs  No Growth at 72 hrs  No Growth After 5 Days  No Growth After 5 Days  --    GRAM STAIN RESULT  1+ Polys  No bacteria seen  No Polys or Bacteria seen  --   --   --    MRSA CULTURE ONLY   --   --   --  No Methicillin Resistant Staphlyococcus aureus (MRSA) isolated       Lab interpretation/comments: White count slightly down trended  Creatinine stable  LFTs unremarkable  Imaging interpretation/comments: No new images overnight    Culture data: Wound cultures pending    Repeat cultures from the blood remain without growth

## 2023-01-25 NOTE — QUICK NOTE
Updated POA (Daughter) about patient's clinical status and current treatment  Patient will bring patients sister tomorrow to visit

## 2023-01-26 LAB
ALBUMIN SERPL BCP-MCNC: 2.5 G/DL (ref 3.5–5)
ALP SERPL-CCNC: 176 U/L (ref 34–104)
ALT SERPL W P-5'-P-CCNC: 13 U/L (ref 7–52)
ANION GAP SERPL CALCULATED.3IONS-SCNC: 7 MMOL/L (ref 4–13)
AST SERPL W P-5'-P-CCNC: 18 U/L (ref 13–39)
BACTERIA BLD CULT: NORMAL
BACTERIA BLD CULT: NORMAL
BACTERIA WND AEROBE CULT: NORMAL
BILIRUB SERPL-MCNC: 0.3 MG/DL (ref 0.2–1)
BUN SERPL-MCNC: 14 MG/DL (ref 5–25)
CALCIUM ALBUM COR SERPL-MCNC: 8.6 MG/DL (ref 8.3–10.1)
CALCIUM SERPL-MCNC: 7.4 MG/DL (ref 8.4–10.2)
CHLORIDE SERPL-SCNC: 106 MMOL/L (ref 96–108)
CO2 SERPL-SCNC: 26 MMOL/L (ref 21–32)
CREAT SERPL-MCNC: 0.55 MG/DL (ref 0.6–1.3)
ERYTHROCYTE [DISTWIDTH] IN BLOOD BY AUTOMATED COUNT: 17.5 % (ref 11.6–15.1)
GFR SERPL CREATININE-BSD FRML MDRD: 101 ML/MIN/1.73SQ M
GLUCOSE SERPL-MCNC: 103 MG/DL (ref 65–140)
GLUCOSE SERPL-MCNC: 131 MG/DL (ref 65–140)
GLUCOSE SERPL-MCNC: 142 MG/DL (ref 65–140)
GLUCOSE SERPL-MCNC: 150 MG/DL (ref 65–140)
GLUCOSE SERPL-MCNC: 150 MG/DL (ref 65–140)
GLUCOSE SERPL-MCNC: 156 MG/DL (ref 65–140)
GLUCOSE SERPL-MCNC: 187 MG/DL (ref 65–140)
GRAM STN SPEC: NORMAL
GRAM STN SPEC: NORMAL
HCT VFR BLD AUTO: 27.4 % (ref 34.8–46.1)
HGB BLD-MCNC: 8.5 G/DL (ref 11.5–15.4)
INR PPP: 1.53 (ref 0.84–1.19)
MCH RBC QN AUTO: 27.2 PG (ref 26.8–34.3)
MCHC RBC AUTO-ENTMCNC: 31 G/DL (ref 31.4–37.4)
MCV RBC AUTO: 88 FL (ref 82–98)
NRBC BLD AUTO-RTO: 0 /100 WBCS
PLATELET # BLD AUTO: 532 THOUSANDS/UL (ref 149–390)
PMV BLD AUTO: 8.3 FL (ref 8.9–12.7)
POTASSIUM SERPL-SCNC: 4 MMOL/L (ref 3.5–5.3)
PROT SERPL-MCNC: 5.5 G/DL (ref 6.4–8.4)
PROTHROMBIN TIME: 18.4 SECONDS (ref 11.6–14.5)
RBC # BLD AUTO: 3.12 MILLION/UL (ref 3.81–5.12)
SODIUM SERPL-SCNC: 139 MMOL/L (ref 135–147)
WBC # BLD AUTO: 20.77 THOUSAND/UL (ref 4.31–10.16)

## 2023-01-26 RX ORDER — CHLORHEXIDINE GLUCONATE 0.12 MG/ML
15 RINSE ORAL ONCE
Status: CANCELLED | OUTPATIENT
Start: 2023-01-27 | End: 2023-01-26

## 2023-01-26 RX ADMIN — LISINOPRIL 40 MG: 20 TABLET ORAL at 08:51

## 2023-01-26 RX ADMIN — HYDRALAZINE HYDROCHLORIDE 25 MG: 25 TABLET, FILM COATED ORAL at 13:00

## 2023-01-26 RX ADMIN — HYDRALAZINE HYDROCHLORIDE 20 MG: 20 INJECTION, SOLUTION INTRAMUSCULAR; INTRAVENOUS at 16:25

## 2023-01-26 RX ADMIN — MEROPENEM 2000 MG: 1 INJECTION, POWDER, FOR SOLUTION INTRAVENOUS at 00:30

## 2023-01-26 RX ADMIN — AMPICILLIN SODIUM 2000 MG: 2 INJECTION, POWDER, FOR SOLUTION INTRAMUSCULAR; INTRAVENOUS at 03:57

## 2023-01-26 RX ADMIN — INSULIN LISPRO 2 UNITS: 100 INJECTION, SOLUTION INTRAVENOUS; SUBCUTANEOUS at 12:59

## 2023-01-26 RX ADMIN — BUPROPION HYDROCHLORIDE 75 MG: 75 TABLET, FILM COATED ORAL at 08:51

## 2023-01-26 RX ADMIN — THIAMINE HCL TAB 100 MG 100 MG: 100 TAB at 08:51

## 2023-01-26 RX ADMIN — MEROPENEM 2000 MG: 1 INJECTION, POWDER, FOR SOLUTION INTRAVENOUS at 08:51

## 2023-01-26 RX ADMIN — WARFARIN SODIUM 3 MG: 3 TABLET ORAL at 16:25

## 2023-01-26 RX ADMIN — MEROPENEM 2000 MG: 1 INJECTION, POWDER, FOR SOLUTION INTRAVENOUS at 16:25

## 2023-01-26 RX ADMIN — SERTRALINE HYDROCHLORIDE 100 MG: 100 TABLET, FILM COATED ORAL at 08:51

## 2023-01-26 RX ADMIN — BUPROPION HYDROCHLORIDE 75 MG: 75 TABLET, FILM COATED ORAL at 16:25

## 2023-01-26 RX ADMIN — Medication 20 MG: at 08:51

## 2023-01-26 RX ADMIN — OXYCODONE HYDROCHLORIDE 5 MG: 5 SOLUTION ORAL at 05:36

## 2023-01-26 RX ADMIN — INSULIN GLARGINE 20 UNITS: 100 INJECTION, SOLUTION SUBCUTANEOUS at 08:51

## 2023-01-26 RX ADMIN — AMPICILLIN SODIUM 2000 MG: 2 INJECTION, POWDER, FOR SOLUTION INTRAMUSCULAR; INTRAVENOUS at 07:48

## 2023-01-26 RX ADMIN — METOPROLOL TARTRATE 25 MG: 25 TABLET, FILM COATED ORAL at 08:51

## 2023-01-26 RX ADMIN — METOPROLOL TARTRATE 25 MG: 25 TABLET, FILM COATED ORAL at 20:24

## 2023-01-26 RX ADMIN — HYDRALAZINE HYDROCHLORIDE 25 MG: 25 TABLET, FILM COATED ORAL at 22:57

## 2023-01-26 RX ADMIN — ACETAMINOPHEN 650 MG: 650 SUSPENSION ORAL at 04:04

## 2023-01-26 RX ADMIN — AMIODARONE HYDROCHLORIDE 400 MG: 200 TABLET ORAL at 16:25

## 2023-01-26 RX ADMIN — AMPICILLIN SODIUM 2000 MG: 2 INJECTION, POWDER, FOR SOLUTION INTRAMUSCULAR; INTRAVENOUS at 15:45

## 2023-01-26 RX ADMIN — OXYCODONE HYDROCHLORIDE 5 MG: 5 SOLUTION ORAL at 23:06

## 2023-01-26 RX ADMIN — OXYCODONE HYDROCHLORIDE 5 MG: 5 SOLUTION ORAL at 16:25

## 2023-01-26 RX ADMIN — AMPICILLIN SODIUM 2000 MG: 2 INJECTION, POWDER, FOR SOLUTION INTRAMUSCULAR; INTRAVENOUS at 20:22

## 2023-01-26 RX ADMIN — AMIODARONE HYDROCHLORIDE 400 MG: 200 TABLET ORAL at 08:51

## 2023-01-26 RX ADMIN — OXYCODONE HYDROCHLORIDE 5 MG: 5 SOLUTION ORAL at 12:59

## 2023-01-26 RX ADMIN — AMPICILLIN SODIUM 2000 MG: 2 INJECTION, POWDER, FOR SOLUTION INTRAMUSCULAR; INTRAVENOUS at 12:59

## 2023-01-26 RX ADMIN — INSULIN LISPRO 2 UNITS: 100 INJECTION, SOLUTION INTRAVENOUS; SUBCUTANEOUS at 17:53

## 2023-01-26 RX ADMIN — CLOPIDOGREL BISULFATE 75 MG: 75 TABLET ORAL at 08:51

## 2023-01-26 RX ADMIN — HYDRALAZINE HYDROCHLORIDE 25 MG: 25 TABLET, FILM COATED ORAL at 05:36

## 2023-01-26 RX ADMIN — AMLODIPINE BESYLATE 10 MG: 10 TABLET ORAL at 08:51

## 2023-01-26 NOTE — ASSESSMENT & PLAN NOTE
She presented with altered mental status, generalized weakness, fall, and sepsis  Etiology possibly due to UTI , polymicrobial bacteremia, and 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 further worsened by her cardiac arrest    · Antibiotic management per infectious disease  Meropenem and ampicillin    · For vascular surgery intervention tomorrow  · For LEON tomorrow

## 2023-01-26 NOTE — ASSESSMENT & PLAN NOTE
Cardiac arrest presumably secondary to electrolyte abnormalities and/or severe sepsis  · Cardiology following   Recommend that we continue with amiodarone, Norvasc, hydralazine, lisinopril, metoprolol

## 2023-01-26 NOTE — PROGRESS NOTES
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 further worsened by her cardiac arrest    · Antibiotic management per infectious disease  Meropenem and ampicillin  · For vascular surgery intervention tomorrow  · For LEON tomorrow    PAD (peripheral artery disease) ([facility] )  Assessment & Plan  S/p L AKA 11/23/22   S/p R AKA, wound debridement 12/1/22   S/p R AKA wound/stump washout and VAC placement 12/16/22   S/p R AKA wound/stump VAC change, L AKA stump washout/packing 12/19/22   Wound Cx 12/19/22: enterobacter cloacae     Plan:  · Vascular Surgery planning surgical intervention teodora for her right AKA stump revision (Imaging showed right femoral stump osteomyelitis)  · Vascular surgery recommends that plavix and coumadin be continued              Cardiac arrest [facility])  Assessment & Plan  Cardiac arrest presumably secondary to electrolyte abnormalities and/or severe sepsis  · Cardiology following  Recommend that we continue with amiodarone, Norvasc, hydralazine, lisinopril, metoprolol    History of CVA (cerebrovascular accident)  Assessment & Plan  Patient had stroke on 11/14 right hemispheric, with residual left-sided weakness  HIT (heparin-induced thrombocytopenia)  Assessment & Plan  History of HIT   On warfarin  Goal INR 2-3      Hypokalemia  Assessment & Plan    Recent Labs     01/24/23  0657 01/25/23  0431 01/26/23  0622   K 3 6 3 8 4 0   MG 1 7* 2 5  --          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 [facility])  Assessment & Plan  Lab Results   Component Value Date    HGBA1C 9 8 (H) 10/25/2022       Recent Labs     23  0655 23  0813 23  1152 23  1744   POCGLU 142* 150* 156* 187*       Blood Sugar Average: Last 72 hrs:  (P) 292 [id number]     Home regimen: Lantus 60U HS, Novolog 2oU TID prior to meals  Inpatient regimen: Lantus 20UHS, sliding scale      Esophageal reflux  Assessment & Plan  Continue PPI       VTE Pharmacologic Prophylaxis:   Pharmacologic: Warfarin (Coumadin)  Mechanical VTE Prophylaxis in Place: Yes    Discussions with Specialists or Other Care Team Provider: nursing, vascular surgery    Education and Discussions with Family / Patient: patient, daughter    Current Length of Stay: 5 day(s)    Current Patient Status: Inpatient   Certification Statement: The patient will continue to require additional inpatient hospital stay due to surgical intervention    Discharge Plan: active    Code Status: Level 3 - DNAR and DNI      Subjective:   Patient seen and examined at bedside  I took care of her in the past and introduced myself as her doctor from before  Unfortunately, his symptoms no recollection of that time that I took care of her  I spoke to her daughter at bedside  They are all agreement with the plan in pursuing her surgical intervention tomorrow  Objective:     Vitals:   Temp (24hrs), Av 3 °F (36 8 °C), Min:96 6 °F (35 9 °C), Max:99 4 °F (37 4 °C)    Temp:  [96 6 °F (35 9 °C)-99 4 °F (37 4 °C)] 99 4 °F (37 4 °C)  HR:  [82-88] 88  Resp:  [16-20] 18  BP: (140-184)/(71-98) 166/85  SpO2:  [95 %-98 %] 96 %  Body mass index is 27 29 kg/m²  Input and Output Summary (last 24 hours): Intake/Output Summary (Last 24 hours) at 2023 1822  Last data filed at 2023 1545  Gross per 24 hour   Intake --   Output 800 ml   Net -800 ml       Physical Exam:     Physical Exam  Vitals reviewed     Constitutional:       General: She is not in acute distress  HENT:      Head: Normocephalic  Nose: Nose normal       Mouth/Throat:      Mouth: Mucous membranes are moist    Eyes:      General: No scleral icterus  Cardiovascular:      Rate and Rhythm: Normal rate  Pulmonary:      Effort: Pulmonary effort is normal  No respiratory distress  Abdominal:      General: There is no distension  Palpations: Abdomen is soft  Tenderness: There is no abdominal tenderness  Musculoskeletal:      Comments: Bilateral aka   Neurological:      Mental Status: She is alert  Comments: Oriented to year, not to place   Psychiatric:      Comments: Flat affect       Additional Data:     Labs:    Results from last 7 days   Lab Units 01/26/23  0622 01/25/23  0431 01/24/23  0657 01/22/23  0624 01/21/23  0539   WBC Thousand/uL 20 77*   < > 27 58*   < > 15 66*   HEMOGLOBIN g/dL 8 5*   < > 8 6*   < > 7 2*   HEMATOCRIT % 27 4*   < > 26 2*   < > 21 3*   PLATELETS Thousands/uL 532*   < > 495*   < > 239   NEUTROS PCT %  --   --   --   --  69   LYMPHS PCT %  --   --   --   --  12*   LYMPHO PCT %  --   --  10*  --   --    MONOS PCT %  --   --   --   --  7   MONO PCT %  --   --  4  --   --    EOS PCT %  --   --  2  --  8*    < > = values in this interval not displayed       Results from last 7 days   Lab Units 01/26/23  0622   SODIUM mmol/L 139   POTASSIUM mmol/L 4 0   CHLORIDE mmol/L 106   CO2 mmol/L 26   BUN mg/dL 14   CREATININE mg/dL 0 55*   ANION GAP mmol/L 7   CALCIUM mg/dL 7 4*   ALBUMIN g/dL 2 5*   TOTAL BILIRUBIN mg/dL 0 30   ALK PHOS U/L 176*   ALT U/L 13   AST U/L 18   GLUCOSE RANDOM mg/dL 150*     Results from last 7 days   Lab Units 01/26/23  0622   INR  1 53*     Results from last 7 days   Lab Units 01/26/23  1744 01/26/23  1152 01/26/23  0813 01/26/23  0655 01/26/23  0020 01/25/23  1748 01/25/23  1159 01/25/23  0554 01/24/23  2348 01/24/23  1752 01/24/23  1158 01/24/23  0842   POC GLUCOSE mg/dl 187* 156* 150* 142* 103 271* 188* 220* 140 130 160* 198*         Results from last 7 days   Lab Units 01/25/23  0431   PROCALCITONIN ng/ml 0 09           * I Have Reviewed All Lab Data Listed Above  * Additional Pertinent Lab Tests Reviewed: Nilda [address] Admission Reviewed      Lines:   Invasive Devices     Peripheral Intravenous Line  Duration           Peripheral IV 01/26/23 Left;Ventral (anterior) Forearm <1 day    Peripheral IV 01/26/23 Right;Ventral (anterior) Forearm <1 day          Drain  Duration           NG/OG/Enteral Tube Nasogastric Right nare 4 days    External Urinary Catheter 3 days                   Imaging:    Imaging Reports Reviewed Today Include: no new imaging    Recent Cultures (last 7 days):     Results from last 7 days   Lab Units 01/24/23  1703 01/21/23  0538   BLOOD CULTURE   --  No Growth After 5 Days  No Growth After 5 Days     GRAM STAIN RESULT  No Polys or Bacteria seen  1+ Polys  No bacteria seen  --    WOUND CULTURE  No growth  Few Colonies of  --        Last 24 Hours Medication List:   Current Facility-Administered Medications   Medication Dose Route Frequency Provider Last Rate   • Acetaminophen  650 mg Oral Q4H PRN Alexandria Earl MD     • amiodarone  400 mg Oral BID With Meals Alexandria Earl MD     • amLODIPine  10 mg Oral Daily Alexandria Earl MD     • ampicillin  2,000 mg Intravenous Q4H Alexandria Earl MD 2,000 mg (01/26/23 1545)   • bisacodyl  10 mg Rectal Daily PRN Alexandria Earl MD     • buPROPion  75 mg Per NG Tube BID Alexandria Earl MD     • clopidogrel  75 mg Oral Daily Alexandria Earl MD     • hydrALAZINE  20 mg Intravenous Q4H PRN Alexandria Earl MD     • hydrALAZINE  25 mg Oral [facility] Alexandria Earl MD     • HYDROmorphone  0 2 mg Intravenous Q4H PRN Alexandria Earl MD     • insulin glargine  20 Units Subcutaneous QAM Alexandria Earl MD     • insulin lispro  2-12 Units Subcutaneous Q6H [facility] Patti Corona Mina Gaines MD     • lisinopril  40 mg Oral Daily Erlin Funes MD     • meropenem  2,000 mg Intravenous Q8H Erlin Funes MD 2,000 mg (01/26/23 9133)   • metoprolol tartrate  25 mg Oral Q12H Luc Vail MD     • omeprazole (PRILOSEC) suspension 2 mg/mL  20 mg Per NG Tube Daily Erlin Funes MD     • oxyCODONE  5 mg Oral Q6H [address] Erlin Funes MD     • senna  1 tablet Oral HS Erlin Funes MD     • sertraline  100 mg Oral Daily Erlin Funes MD     • thiamine  100 mg Oral Daily Erlin Funes MD     • warfarin  3 mg Oral Daily (warfarin) Erlin Funes MD          Today, Patient Was Seen By: La Santizo MD    ** Please Note: Dictation voice to text software may have been used in the creation of this document   **

## 2023-01-26 NOTE — PROGRESS NOTES
Progress Note - Infectious Disease   Gopal Ha 65 y o  female MRN: [id number]  Unit/Bed#: [place] [address] -01 Encounter: [id number]      Impression/Plan:  1   Sepsis, POA   Patient met sepsis criteria on presentation   Sources are 2 and 3  Course complicated by 4   Extensive resistance developing on prior cultures   2D echo technically difficult study   Extensive CT imaging without any focal findings initially   Clinical parameters overall improved except for leukocytosis   Ongoing white count likely related to the right stump, now downtrending/stable with bedside intervention  Antibiotics as below  Continue to trend fever curve/vitals  Repeat CBC and CMP tomorrow  Follow-up pending blood and wound cultures  Ongoing follow-up by vascular surgery  Ongoing follow-up by cardiology  Additional supportive care as per primary  Goals of care discussions ongoing     2   Polymicrobial bacteremia   Blood cultures have isolated Enterobacter and Enterococcus faecalis  Enterobacter resistant to Ertapenem but no carbapenemase present on confirmatory testing   My suspicion overall remains for infection involving her right AKA stump given prior wound cultures and now findings on CT imaging with contrast   Urinary source considered given isolation of Enterococcus   Consider also endovascular source given 4   2D echo limited  No other obvious ectopic focus of infection on extensive imaging   Repeat cultures cleared  Wound cultures from 1/24 with a few colonies of staph, uncertain significance  Potential transesophageal echo tomorrow  Potential OR intervention with vascular tomorrow  This is all of course pending goals of care discussions    Continue meropenem for now  Continue ampicillin for now  Continue to trend fever curve/vitals  Repeat CBC and CMP tomorrow  Follow up pending cultures  Transesophageal echo recommended  Anticipate at minimum 2 weeks of IV antibiotic for this issue  Potential for oral therapy thereafter for bone and joint site infection  Ongoing follow-up by vascular surgery  Additional interventions pending clinical course     3   Complicated urinary tract infection   Patient unable to provide any history  Lyle Meza was grossly abnormal on presentation   Possibly contributed to presentation   Higher suspicion for wound infection  Course of therapy completed for this issue  Continue antibiotic as above  Monitor abdominal exam  Catheters as per primary     4   Cardiac arrest   Suspect that this may have been unrelated to the above   Patient with multiple other comorbidities as well   Ongoing care and work-up as per critical care/cardiology  Patient extubated 2023     5   Transaminitis   Acute elevation noted likely in the setting of 4   We will need to monitor LFTs however on meropenem  Stable  Antibiotic as above  Repeat LFTs tomorrow  Monitor abdominal exam     Above plan discussed briefly with the patient and with her sister at bedside  ID consult service will continue to follow      Antibiotics:  Meropenem 9  Ampicillin 4  Total antibiotic 9    24 Hour events:  Yesterday and overnight notes reviewed and no acute events noted  Goals of care discussions noted  Subjective:  Patient seen at bedside and she will open her eyes and will intermittently respond  Her sister is present at bedside and she reports that she has been interacting more compared to yesterday  Family is still having difficulty deciding how aggressive they want to be for the patient's continued care  Objective:  Vitals:  Temp:  [96 6 °F (35 9 °C)-98 7 °F (37 1 °C)] 96 6 °F (35 9 °C)  HR:  [80-88] 85  Resp:  [14-20] 18  BP: (131-176)/(57-98) 167/86  SpO2:  [95 %-100 %] 96 %  Temp (24hrs), Av 9 °F (36 6 °C), Min:96 6 °F (35 9 °C), Max:98 7 °F (37 1 °C)  Current: Temperature: (!) 96 6 °F (35 9 °C)    Physical Exam:   General Appearance:   Intermittently awakens and is interactive  Reportedly more interactive with her sister  Chronically ill-appearing and frail  Throat: Oropharynx moist without lesions  Lungs:   Clear to auscultation bilaterally; no wheezes, rhonchi or rales; respirations unlabored on room air   Heart:  RRR; no murmur, rub or gallop appreciated   Abdomen:   Soft, non-tender, non-distended, positive bowel sounds  Extremities: No clubbing, cyanosis; edema in the stumps bilaterally  Skin: No new rashes or lesions  No new draining wounds noted  Patient again has pain with manipulation of the right stump  Labs, Imaging, & Other studies:   All pertinent labs and imaging studies were personally reviewed as below  Results from last 7 days   Lab Units 01/26/23  0622 01/25/23  0431 01/24/23  0657   WBC Thousand/uL 20 77* 20 53* 27 58*   HEMOGLOBIN g/dL 8 5* 8 6* 8 6*   PLATELETS Thousands/uL 532* 502* 495*     Results from last 7 days   Lab Units 01/26/23  0622 01/25/23  0431 01/24/23  0657   POTASSIUM mmol/L 4 0 3 8 3 6   CHLORIDE mmol/L 106 111* 111*   CO2 mmol/L 26 24 25   BUN mg/dL 14 13 10   CREATININE mg/dL 0 55* 0 50* 0 46*   EGFR ml/min/1 73sq m 101 104 107   CALCIUM mg/dL 7 4* 7 5* 7 5*   AST U/L 18 16 11*   ALT U/L 13 10 12   ALK PHOS U/L 176* 174* 187*     Results from last 7 days   Lab Units 01/24/23  1703 01/21/23  0538 01/19/23  1226   BLOOD CULTURE   --  No Growth After 4 Days  No Growth After 4 Days  No Growth After 5 Days  No Growth After 5 Days  GRAM STAIN RESULT  No Polys or Bacteria seen  1+ Polys  No bacteria seen  --   --    WOUND CULTURE  No growth  No growth  --   --        Lab interpretation/comments: White blood cell count stable today  Creatinine stable  LFTs unremarkable  Imaging interpretation/comments: No new images overnight  Culture data: Cultures from bedside I&D with a few colonies of staph  Uncertain significance

## 2023-01-26 NOTE — ASSESSMENT & PLAN NOTE
S/p L AKA 11/23/22   S/p R AKA, wound debridement 12/1/22   S/p R AKA wound/stump washout and VAC placement 12/16/22   S/p R AKA wound/stump VAC change, L AKA stump washout/packing 12/19/22   Wound Cx 12/19/22: enterobacter cloacae     Plan:  · Vascular Surgery planning surgical intervention teodora for her right AKA stump revision (Imaging showed right femoral stump osteomyelitis)    · Vascular surgery recommends that plavix and coumadin be continued

## 2023-01-26 NOTE — SPEECH THERAPY NOTE
Speech Language/Pathology  Much more alert and conversed appropriately  "I have a lot to think about  My sister told me I'm never going to get back home  I just want to go home and sit w/ my cat"    Attempted to see pt w/ PO trials  Staff report she has been drinking water throughout the day  NGT is still in and running  Puree w/ thin was recommneded yesterday  Refused coke  Stated she wanted raisin bran  I told her I would get it for her  "nah, never mind  I don't want it"  Noted sx is tentatively scheduled w/ vascular and for LEON tomorrow unless pt decides to cancel  Recommend advance diet to dysphagia 2 mechanical soft and thin   (vs will be npo for sx)

## 2023-01-26 NOTE — ASSESSMENT & PLAN NOTE
Lab Results   Component Value Date    HGBA1C 9 8 (H) 10/25/2022       Recent Labs     01/26/23  0655 01/26/23  0813 01/26/23  1152 01/26/23  1744   POCGLU 142* 150* 156* 187*       Blood Sugar Average: Last 72 hrs:  (P) 451 [id number]     Home regimen: Lantus 60U HS, Novolog 2oU TID prior to meals  Inpatient regimen: Lantus 20UHS, sliding scale

## 2023-01-26 NOTE — CASE MANAGEMENT
Case Management Progress Note    Patient name Wayne Read  Location [place] [facility] /[place] 2 Sheng Fonseca* MRN [id number]  : 1960 Date 2023       LOS (days): 9  Geometric Mean LOS (GMLOS) (days): 5 00  Days to GMLOS:-3 9        OBJECTIVE:        Current admission status: Inpatient  Preferred Pharmacy:   [id number] [address]  [address] [place] [address] [address]  [facility] [address]  Phone: [phone] Fax: [phone]    Primary Care Provider: ALEX Fraire    Primary Insurance: [facility]  Secondary Insurance:     PROGRESS NOTE:  CM called daughter Alyssa Abraham to follow up regarding discharge planning  Phone went straight to [facility] left requesting call back

## 2023-01-26 NOTE — ANESTHESIA PREPROCEDURE EVALUATION
Procedure:  AMPUTATION REVISION STUMP (Right: Leg Lower)    Relevant Problems   CARDIO   (+) Benign essential hypertension   (+) Cardiac arrest (HCC)   (+) Familial combined hyperlipidemia   (+) Hypertensive urgency   (+) Systolic murmur      ENDO   (+) Hyperparathyroidism (HCC)   (+) Type 2 diabetes mellitus with hyperglycemia, with long-term current use of insulin (HCC)   (+) 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 (HCC)      GI/HEPATIC   (+) Esophageal reflux      HEMATOLOGY   (+) Anemia   (+) HIT (heparin-induced thrombocytopenia)      MUSCULOSKELETAL   (+) Arthritis      NEURO/PSYCH   (+) Anxiety   (+) CVA (cerebral vascular accident) ([address] )   (+) Depression, recurrent (HCC)   (+) Diabetic peripheral neuropathy (HCC)   (+) History of CVA (cerebrovascular accident)   (+) Stroke ([address] )      Other   (+) Osteomyelitis (HCC)        Physical Exam    Airway    Mallampati score: II         Dental       Cardiovascular  Rhythm: regular, Rate: normal,     Pulmonary  Pulmonary exam normal     Other Findings        Anesthesia Plan  ASA Score- 4     Anesthesia Type-         Additional Monitors:   Airway Plan: ETT  Plan Factors-    Chart reviewed  Existing labs reviewed  Patient summary reviewed  Patient is not a current smoker  Induction- intravenous  Postoperative Plan-     Informed Consent- Anesthetic plan and risks discussed with daughter

## 2023-01-26 NOTE — ASSESSMENT & PLAN NOTE
65year old female former smoker w/complicated medical hx including HTN, HLD, uncontrolled type II DM (A1c 9 8), JUSTIN, R hemispheric CVA, bilateral tissue loss s/p multiple vascular interventions, subsequently requiring b/l AKA  Patient now presents with sepsis, bacteremia and UTI with altered mental status/encephalopathy  Patient ultimately suffered Vfib arrest and remains intubated with plan for extubation  Vascular surgery consulted to r/o stump infection as source of bacteremia/sepsis  S/p L AKA 11/23/22 [facility])  S/p R AKA, wound debridement 12/1/22 [facility])  S/p R AKA wound/stump washout and VAC placement 12/16/22 [facility])  S/p R AKA wound/stump VAC change, L AKA stump washout/packing 12/19/22 ([facility])  Wound Cx 12/19/22: enterobacter cloacae     Blood Cx: +Enterobacter cloacae, +Enterococcus faecalis   Urine Cx: +Enterococcus faecalis, +Citrobacter freundii, +Klebsiella variicola     Recommendations:  - S/p bilateral AKA w/subsequent bilateral revision/wash out  Prior L stump wound cultures grew enterobacter cloacae, which is now growing in blood culture  - On exam, left stump is well healed without erythema  R stump posterior wounds with healthy granulation tissue  Anterior stump scabbing with some bogginess  Now s/p I&D of anterior and posterior stump wounds  Anterior stump wound with gross purulence noted during drainage  Currently incision is clean, probes to bone  - CT w/contrast reviewed which suggests femur OM  - Patient with polymicrobial urine and blood cultures  Wound cultures pending   - Patient remains stable  More alert this AM  Able to answer questions and partake in medical decisions  - Extensive conversation held with patient, daughter and sister    Options are to proceed with right AKA stump revision tomorrow in the operating room with possible simultaneous LEON vs foregoing any surgical procedure and continuing with palliative/conservative management with antibiotics and observation  We discussed that if surgery is not pursued, there is no way to completely resolve all underlying bone infection, and there is a chance that patient can become septic again which would be life-threatening  Patient wishes to have conversation with family to determine how she wishes to proceed  For now, we will keep the OR case scheduled as planned  Patient is aware that she is able to cancel procedure at any time  - ID on board, appreciate recommendations   - Cardiology following as patient is now s/p arrest w/Vfib  Planning for LEON   - Patient on coumadin and Plavix at baseline  INR noted to be supratherapeutic on admission   Continue coumadin  - D/w Dr Ethlyn Gottron

## 2023-01-26 NOTE — WOUND OSTOMY CARE
Progress Note - Wound   Pappas Dubs 65 y o  female MRN: [id number]  Unit/Bed#: [place] [address] -01 Encounter: [id number]        Assessment:   Patient seen for wound care follow-up  She is agreeable to assessment  Requires assist x 2 to turn in bed  Receiving tube feeding  Incontinent of bowel and bladder--purewick in place for urinary management  Left stump intact  Buttocks and sacrum intact with blanchable erythema  Ecchymosis to face and chin  1   Right anterior thigh traumatic wound--not assessed at this time  Status post I & D by vascular surgery on 1/25/2023  Dressing dry and intact  2   Right AKA incision--remains open, pink, partial thickness  Scant serous drainage  Michaela-wound intact with scattered irregular areas of hyperpigmentation  3   Right lateral thigh--90% pink, 10% tan  Partial thickness  Dry  Michaela-wound with blanchable erythema/hyperpigmentation  No drainage  See flowsheet for wound details  No evidence of wound infection at this time        Wound Care Plan:   1-Right anterior thigh per vascular surgery status post I & D   2-Low air-loss mattress  3-Offloading air cushion in chair if/when out of bed  4-Moisturize skin daily with skin nourishing cream   5-Turn/reposition every 2 hours while in bed using positioning wedges; and weight shift frequently while in chair for pressure re-distribution on skin  6-Buttocks/sacrum--cleanse with soap and water, pat dry  Apply Calazime paste three times daily and as needed with incontinence care  7-Right stump incision and lateral thigh wound--cleanse with normal saline, pat dry  Apply Dermagran (cut to fit size of wound)  Cover with Allevyn Life foam dressings  Change dressings every other day      Wound care team to follow  Plan of care reviewed with primary RN      Wound 12/01/22 Surgical Thigh Right (Active)   Wound Image   01/26/23 1452   Wound Description Granulation tissue;Pink 01/26/23 1500   Michaela-wound Assessment Intact 01/26/23 1500   Wound Length (cm) 1 5 cm 01/26/23 1452   Wound Width (cm) 4 cm 01/26/23 1452   Wound Depth (cm) 0 1 cm 01/26/23 1452   Wound Surface Area (cm^2) 6 cm^2 01/26/23 1452   Wound Volume (cm^3) 0 6 cm^3 01/26/23 1452   Calculated Wound Volume (cm^3) 0 6 cm^3 01/26/23 1452   Drainage Amount Scant 01/26/23 1452   Drainage Description Serous 01/26/23 1452   Non-staged Wound Description Partial thickness 01/26/23 1452   Treatments Cleansed 01/26/23 1452   Dressing Foam, Silicon (eg  Allevyn, etc); Dermagran gauze 01/26/23 1452   Dressing Changed Changed 01/26/23 1452   Patient Tolerance Tolerated well 01/26/23 1452   Dressing Status Clean;Dry; Intact 01/26/23 1452       Wound 01/18/23 Thigh Right;Lateral (Active)   Wound Image   01/26/23 1451   Wound Description Dry;Pink; Tan 01/26/23 1500   Michaela-wound Assessment Erythema 01/26/23 1500   Wound Length (cm) 1 cm 01/26/23 1451   Wound Width (cm) 0 5 cm 01/26/23 1451   Wound Depth (cm) 0 1 cm 01/26/23 1451   Wound Surface Area (cm^2) 0 5 cm^2 01/26/23 1451   Wound Volume (cm^3) 0 05 cm^3 01/26/23 1451   Calculated Wound Volume (cm^3) 0 05 cm^3 01/26/23 1451   Change in Wound Size % 75 01/26/23 1451   Drainage Amount None 01/26/23 1451   Non-staged Wound Description Partial thickness 01/26/23 1451   Treatments Cleansed 01/26/23 1451   Dressing Open to air 01/26/23 1451   Patient Tolerance Tolerated well 01/26/23 1451   Dressing Status Clean;Dry; Intact 01/26/23 [facility] Sergio SAMSONN, RN, [employer]

## 2023-01-26 NOTE — CASE MANAGEMENT
Case Management Progress Note    Patient name Mian Sequeira  Location [facility] Emma Ovalles* MRN [id number]  : 1960 Date 2023       LOS (days): 9  Geometric Mean LOS (GMLOS) (days): 5 00  Days to GMLOS:-4        OBJECTIVE:        Current admission status: Inpatient  Preferred Pharmacy:   [id number], [address] [place] [address] [address]  [address] [place] [address] [address]  [place] [address]  Phone: [phone] Fax: [phone]    Primary Care Provider: ALEX Rausch    Primary Insurance: [facility]  Secondary Insurance:     PROGRESS NOTE:  CM spoke with daughter April Martell via phone to follow up about decision regarding hospice  Barbara Mode reported after speaking with pt and family, they have decided to move forward with the surgery tomorrow  Family will resume goals of care discussion depending how pt progresses afterwards  CM dept to follow

## 2023-01-26 NOTE — PROGRESS NOTES
[facility]  Progress Note - [address] 1960, 65 y o  female MRN: [id number]  Unit/Bed#: [place] [address] -01 Encounter: [id number]  Primary Care Provider: ALEX Cano   Date and time admitted to hospital: 1/17/2023  3:48 PM    Bacteremia  Assessment & Plan  65year old female former smoker w/complicated medical hx including HTN, HLD, uncontrolled type II DM (A1c 9 8), JUSTIN, R hemispheric CVA, bilateral tissue loss s/p multiple vascular interventions, subsequently requiring b/l AKA  Patient now presents with sepsis, bacteremia and UTI with altered mental status/encephalopathy  Patient ultimately suffered Vfib arrest and remains intubated with plan for extubation  Vascular surgery consulted to r/o stump infection as source of bacteremia/sepsis  S/p L AKA 11/23/22 [facility])  S/p R AKA, wound debridement 12/1/22 [facility])  S/p R AKA wound/stump washout and VAC placement 12/16/22 [facility])  S/p R AKA wound/stump VAC change, L AKA stump washout/packing 12/19/22 ([facility])  Wound Cx 12/19/22: enterobacter cloacae     Blood Cx: +Enterobacter cloacae, +Enterococcus faecalis   Urine Cx: +Enterococcus faecalis, +Citrobacter freundii, +Klebsiella variicola     Recommendations:  - S/p bilateral AKA w/subsequent bilateral revision/wash out  Prior L stump wound cultures grew enterobacter cloacae, which is now growing in blood culture  - On exam, left stump is well healed without erythema  R stump posterior wounds with healthy granulation tissue  Anterior stump scabbing with some bogginess  Now s/p I&D of anterior and posterior stump wounds  Anterior stump wound with gross purulence noted during drainage  Currently incision is clean, probes to bone  - CT w/contrast reviewed which suggests femur OM  - Patient with polymicrobial urine and blood cultures  Wound cultures pending   - Patient remains stable   More alert this AM  Able to answer questions and partake in medical decisions  - Extensive conversation held with patient, daughter and sister  Options are to proceed with right AKA stump revision tomorrow in the operating room with possible simultaneous LEON vs foregoing any surgical procedure and continuing with palliative/conservative management with antibiotics and observation  We discussed that if surgery is not pursued, there is no way to completely resolve all underlying bone infection, and there is a chance that patient can become septic again which would be life-threatening  Patient wishes to have conversation with family to determine how she wishes to proceed  For now, we will keep the OR case scheduled as planned  Patient is aware that she is able to cancel procedure at any time  - ID on board, appreciate recommendations   - Cardiology following as patient is now s/p arrest w/Vfib  Planning for LEON   - Patient on coumadin and Plavix at baseline  INR noted to be supratherapeutic on admission  Continue coumadin  - D/w Dr Jocelyne Giron    Subjective: Patient seen and examined  Much more alert today  Able to participate in medical care and answer questions appropriately  Patient was transferred out of the ICU yesterday  She remains stable  Vitals:  /86 (BP Location: Left arm)   Pulse 85   Temp (!) 96 6 °F (35 9 °C) (Oral)   Resp 18   Ht 5' 4" (1 626 m)   Wt 72 1 kg (159 lb)   SpO2 96%   BMI 27 29 kg/m²     I/Os:  I/O last 3 completed shifts: In: 2608 8 [NG/GT:630; IV Piggyback:799 8; Feedings:1179]  Out: 600 [Urine:600]  No intake/output data recorded      Lab Results and Cultures:   CBC with diff:   Lab Results   Component Value Date    WBC 20 77 (H) 01/26/2023    HGB 8 5 (L) 01/26/2023    HCT 27 4 (L) 01/26/2023    MCV 88 01/26/2023     (H) 01/26/2023    MCH 27 2 01/26/2023    MCHC 31 0 (L) 01/26/2023    RDW 17 5 (H) 01/26/2023    MPV 8 3 (L) 01/26/2023    NRBC 0 01/26/2023   ,   BMP/CMP:  Lab Results   Component Value Date    SODIUM 139 01/26/2023    K 4 0 01/26/2023     01/26/2023    CO2 26 01/26/2023    BUN 14 01/26/2023    CREATININE 0 55 (L) 01/26/2023    CALCIUM 7 4 (L) 01/26/2023    AST 18 01/26/2023    ALT 13 01/26/2023    ALKPHOS 176 (H) 01/26/2023    EGFR 101 01/26/2023   ,   Lipid Panel: No results found for: CHOL,   Coags:   Lab Results   Component Value Date    PTT 57 (H) 01/17/2023    INR 1 53 (H) 01/26/2023   ,     Blood Culture:   Lab Results   Component Value Date    BLOODCX No Growth After 4 Days  01/21/2023    BLOODCX No Growth After 4 Days   01/21/2023   ,   Urinalysis:   Lab Results   Component Value Date    COLORU Yellow 01/17/2023    CLARITYU Cloudy 01/17/2023    SPECGRAV 1 025 01/17/2023    PHUR 6 0 01/17/2023    PHUR 6 5 02/23/2018    LEUKOCYTESUR Small (A) 01/17/2023    NITRITE Negative 01/17/2023    GLUCOSEU Negative 01/17/2023    KETONESU Negative 01/17/2023    BILIRUBINUR Negative 01/17/2023    BLOODU Large (A) 01/17/2023   ,   Urine Culture:   Lab Results   Component Value Date    URINECX >100,000 cfu/ml Enterococcus faecalis (A) 01/17/2023    URINECX <10,000 cfu/ml Citrobacter freundii (A) 01/17/2023    URINECX <10,000 cfu/ml Klebsiella variicola (A) 01/17/2023   ,   Wound Culure:   Lab Results   Component Value Date    WOUNDCULT No growth 01/24/2023    WOUNDCULT Few Colonies of 01/24/2023       Medications:  Current Facility-Administered Medications   Medication Dose Route Frequency   • Acetaminophen (TYLENOL) oral suspension 650 mg  650 mg Oral Q4H PRN   • amiodarone tablet 400 mg  400 mg Oral BID With Meals   • amLODIPine (NORVASC) tablet 10 mg  10 mg Oral Daily   • ampicillin (OMNIPEN) 2,000 mg in sodium chloride 0 9 % 100 mL IVPB  2,000 mg Intravenous Q4H   • bisacodyl (DULCOLAX) rectal suppository 10 mg  10 mg Rectal Daily PRN   • buPROPion (WELLBUTRIN) tablet 75 mg  75 mg Per NG Tube BID   • clopidogrel (PLAVIX) tablet 75 mg  75 mg Oral Daily   • hydrALAZINE (APRESOLINE) injection 20 mg  20 mg Intravenous Q4H PRN   • hydrALAZINE (APRESOLINE) tablet 25 mg  25 mg Oral Q8H [facility]   • HYDROmorphone (DILAUDID) injection 0 2 mg  0 2 mg Intravenous Q4H PRN   • insulin glargine (LANTUS) subcutaneous injection 20 Units 0 2 mL  20 Units Subcutaneous QAM   • insulin lispro (HumaLOG) 100 units/mL subcutaneous injection 2-12 Units  2-12 Units Subcutaneous Q6H [facility]   • lisinopril (ZESTRIL) tablet 40 mg  40 mg Oral Daily   • meropenem (MERREM) 2,000 mg in sodium chloride 0 9 % 100 mL IVPB  2,000 mg Intravenous Q8H   • metoprolol tartrate (LOPRESSOR) tablet 25 mg  25 mg Oral Q12H [facility]   • omeprazole (PRILOSEC) suspension 2 mg/mL  20 mg Per NG Tube Daily   • oxyCODONE (ROXICODONE) oral solution 5 mg  5 mg Oral Q6H [facility]   • senna (SENOKOT) tablet 8 6 mg  1 tablet Oral HS   • sertraline (ZOLOFT) tablet 100 mg  100 mg Oral Daily   • thiamine tablet 100 mg  100 mg Oral Daily   • warfarin (COUMADIN) tablet 3 mg  3 mg Oral Daily (warfarin)       Imaging:  Reviewed    Physical Exam:    General: Alert and oriented x3  In no acute distress  CV: Regular rate  Respiratory: Normal effort  Abdominal: Soft, nondistended  Extremities: Status post bilateral AKA's with right stump wounds  No surrounding cellulitis or gross purulence appreciated    Neurologic: Grossly normal      Fabian Reveles PA-C  1/26/2023

## 2023-01-26 NOTE — PLAN OF CARE
Problem: MOBILITY - ADULT  Goal: Maintain or return to baseline ADL function  Description: INTERVENTIONS:  -  Assess patient's ability to carry out ADLs; assess patient's baseline for ADL function and identify physical deficits which impact ability to perform ADLs (bathing, care of mouth/teeth, toileting, grooming, dressing, etc )  - Assess/evaluate cause of self-care deficits   - Assess range of motion  - Assess patient's mobility; develop plan if impaired  - Assess patient's need for assistive devices and provide as appropriate  - Encourage maximum independence but intervene and supervise when necessary  - Involve family in performance of ADLs  - Assess for home care needs following discharge   - Consider OT consult to assist with ADL evaluation and planning for discharge  - Provide patient education as appropriate  Outcome: Progressing  Goal: Maintains/Returns to pre admission functional level  Description: INTERVENTIONS:  - Perform BMAT or MOVE assessment daily    - Set and communicate daily mobility goal to care team and patient/family/caregiver  - Collaborate with rehabilitation services on mobility goals if consulted  - Perform Range of Motion 2 times a day  - Reposition patient every 2 hours    - Dangle patient 2 times a day  - Stand patient 2 times a day  - Ambulate patient 2 times a day  - Out of bed to chair 2 times a day   - Out of bed for meals 2 times a day  - Out of bed for toileting  - Record patient progress and toleration of activity level   Outcome: Progressing     Problem: Prexisting or High Potential for Compromised Skin Integrity  Goal: Skin integrity is maintained or improved  Description: INTERVENTIONS:  - Identify patients at risk for skin breakdown  - Assess and monitor skin integrity  - Assess and monitor nutrition and hydration status  - Monitor labs   - Assess for incontinence   - Turn and reposition patient  - Assist with mobility/ambulation  - Relieve pressure over bony prominences  - Avoid friction and shearing  - Provide appropriate hygiene as needed including keeping skin clean and dry  - Evaluate need for skin moisturizer/barrier cream  - Collaborate with interdisciplinary team   - Patient/family teaching  - Consider wound care consult   Outcome: Progressing     Problem: PAIN - ADULT  Goal: Verbalizes/displays adequate comfort level or baseline comfort level  Description: Interventions:  - Encourage patient to monitor pain and request assistance  - Assess pain using appropriate pain scale  - Administer analgesics based on type and severity of pain and evaluate response  - Implement non-pharmacological measures as appropriate and evaluate response  - Consider cultural and social influences on pain and pain management  - Notify physician/advanced practitioner if interventions unsuccessful or patient reports new pain  Outcome: Progressing     Problem: INFECTION - ADULT  Goal: Absence or prevention of progression during hospitalization  Description: INTERVENTIONS:  - Assess and monitor for signs and symptoms of infection  - Monitor lab/diagnostic results  - Monitor all insertion sites, i e  indwelling lines, tubes, and drains  - Monitor endotracheal if appropriate and nasal secretions for changes in amount and color  - [place] appropriate cooling/warming therapies per order  - Administer medications as ordered  - Instruct and encourage patient and family to use good hand hygiene technique  - Identify and instruct in appropriate isolation precautions for identified infection/condition  Outcome: Progressing  Goal: Absence of fever/infection during neutropenic period  Description: INTERVENTIONS:  - Monitor WBC    Outcome: Progressing     Problem: SAFETY ADULT  Goal: Maintain or return to baseline ADL function  Description: INTERVENTIONS:  -  Assess patient's ability to carry out ADLs; assess patient's baseline for ADL function and identify physical deficits which impact ability to perform ADLs (bathing, care of mouth/teeth, toileting, grooming, dressing, etc )  - Assess/evaluate cause of self-care deficits   - Assess range of motion  - Assess patient's mobility; develop plan if impaired  - Assess patient's need for assistive devices and provide as appropriate  - Encourage maximum independence but intervene and supervise when necessary  - Involve family in performance of ADLs  - Assess for home care needs following discharge   - Consider OT consult to assist with ADL evaluation and planning for discharge  - Provide patient education as appropriate  Outcome: Progressing  Goal: Maintains/Returns to pre admission functional level  Description: INTERVENTIONS:  - Perform BMAT or MOVE assessment daily    - Set and communicate daily mobility goal to care team and patient/family/caregiver  - Collaborate with rehabilitation services on mobility goals if consulted  - Perform Range of Motion 2 times a day  - Reposition patient every 2 hours  - Dangle patient 2 times a day  - Stand patient 2 times a day  - Ambulate patient 2 times a day  - Out of bed to chair 2 times a day   - Out of bed for meals 2 times a day  - Out of bed for toileting  - Record patient progress and toleration of activity level   Outcome: Progressing  Goal: Patient will remain free of falls  Description: INTERVENTIONS:  - Educate patient/family on patient safety including physical limitations  - Instruct patient to call for assistance with activity   - Consult OT/PT to assist with strengthening/mobility   - Keep Call bell within reach  - Keep bed low and locked with side rails adjusted as appropriate  - Keep care items and personal belongings within reach  - Initiate and maintain comfort rounds  - Make Fall Risk Sign visible to staff  - Offer Toileting every 2 Hours, in advance of need  - Initiate/Maintain bed alarm  - Obtain necessary fall risk management equipment    - Apply yellow socks and bracelet for high fall risk patients  - Consider moving patient to room near nurses station  Outcome: Progressing     Problem: DISCHARGE PLANNING  Goal: Discharge to home or other facility with appropriate resources  Description: INTERVENTIONS:  - Identify barriers to discharge w/patient and caregiver  - Arrange for needed discharge resources and transportation as appropriate  - Identify discharge learning needs (meds, wound care, etc )  - Arrange for interpretive services to assist at discharge as needed  - Refer to Case Management Department for coordinating discharge planning if the patient needs post-hospital services based on physician/advanced practitioner order or complex needs related to functional status, cognitive ability, or social support system  Outcome: Progressing     Problem: Knowledge Deficit  Goal: Patient/family/caregiver demonstrates understanding of disease process, treatment plan, medications, and discharge instructions  Description: Complete learning assessment and assess knowledge base  Interventions:  - Provide teaching at level of understanding  - Provide teaching via preferred learning methods  Outcome: Progressing     Problem: Nutrition/Hydration-ADULT  Goal: Nutrient/Hydration intake appropriate for improving, restoring or maintaining nutritional needs  Description: Monitor and assess patient's nutrition/hydration status for malnutrition  Collaborate with interdisciplinary team and initiate plan and interventions as ordered  Monitor patient's weight and dietary intake as ordered or per policy  Utilize nutrition screening tool and intervene as necessary  Determine patient's food preferences and provide high-protein, high-caloric foods as appropriate       INTERVENTIONS:  - Monitor oral intake, urinary output, labs, and treatment plans  - Assess nutrition and hydration status and recommend course of action  - Evaluate amount of meals eaten  - Assist patient with eating if necessary   - Allow adequate time for meals  - Recommend/ encourage appropriate diets, oral nutritional supplements, and vitamin/mineral supplements  - Order, calculate, and assess calorie counts as needed  - Recommend, monitor, and adjust tube feedings and TPN/PPN based on assessed needs  - Assess need for intravenous fluids  - Provide specific nutrition/hydration education as appropriate  - Include patient/family/caregiver in decisions related to nutrition  Outcome: Progressing     Problem: COPING  Goal: Pt/Family able to verbalize concerns and demonstrate effective coping strategies  Description: INTERVENTIONS:  - Assist patient/family to identify coping skills, available support systems and cultural and spiritual values  - Provide emotional support, including active listening and acknowledgement of concerns of patient and caregivers  - Reduce environmental stimuli, as able  - Provide patient education  - Assess for spiritual pain/suffering and initiate spiritual care, including notification of Pastoral Care or chidi based community as needed  - Assess effectiveness of coping strategies  Outcome: Progressing  Goal: Will report anxiety at manageable levels  Description: INTERVENTIONS:  - Administer medication as ordered  - Teach and encourage coping skills  - Provide emotional support  - Assess patient/family for anxiety and ability to cope  Outcome: Progressing

## 2023-01-27 ENCOUNTER — APPOINTMENT (OUTPATIENT)
Dept: NON INVASIVE DIAGNOSTICS | Facility: HOSPITAL | Age: 63
End: 2023-01-27

## 2023-01-27 ENCOUNTER — ANESTHESIA (INPATIENT)
Dept: PERIOP | Facility: HOSPITAL | Age: 63
End: 2023-01-27

## 2023-01-27 LAB
ABO GROUP BLD: NORMAL
ALBUMIN SERPL BCP-MCNC: 2.4 G/DL (ref 3.5–5)
ALP SERPL-CCNC: 192 U/L (ref 34–104)
ALT SERPL W P-5'-P-CCNC: 12 U/L (ref 7–52)
ANION GAP SERPL CALCULATED.3IONS-SCNC: 7 MMOL/L (ref 4–13)
AST SERPL W P-5'-P-CCNC: 17 U/L (ref 13–39)
BACTERIA SPEC ANAEROBE CULT: ABNORMAL
BACTERIA SPEC ANAEROBE CULT: ABNORMAL
BACTERIA SPEC ANAEROBE CULT: NORMAL
BILIRUB SERPL-MCNC: 0.33 MG/DL (ref 0.2–1)
BLD GP AB SCN SERPL QL: NEGATIVE
BUN SERPL-MCNC: 12 MG/DL (ref 5–25)
CALCIUM ALBUM COR SERPL-MCNC: 8.8 MG/DL (ref 8.3–10.1)
CALCIUM SERPL-MCNC: 7.5 MG/DL (ref 8.4–10.2)
CHLORIDE SERPL-SCNC: 103 MMOL/L (ref 96–108)
CO2 SERPL-SCNC: 26 MMOL/L (ref 21–32)
CREAT SERPL-MCNC: 0.47 MG/DL (ref 0.6–1.3)
ERYTHROCYTE [DISTWIDTH] IN BLOOD BY AUTOMATED COUNT: 17 % (ref 11.6–15.1)
GFR SERPL CREATININE-BSD FRML MDRD: 106 ML/MIN/1.73SQ M
GLUCOSE SERPL-MCNC: 106 MG/DL (ref 65–140)
GLUCOSE SERPL-MCNC: 109 MG/DL (ref 65–140)
GLUCOSE SERPL-MCNC: 124 MG/DL (ref 65–140)
GLUCOSE SERPL-MCNC: 91 MG/DL (ref 65–140)
GLUCOSE SERPL-MCNC: 98 MG/DL (ref 65–140)
HCT VFR BLD AUTO: 28.6 % (ref 34.8–46.1)
HGB BLD-MCNC: 9 G/DL (ref 11.5–15.4)
INR PPP: 1.31 (ref 0.84–1.19)
MAGNESIUM SERPL-MCNC: 1.6 MG/DL (ref 1.9–2.7)
MCH RBC QN AUTO: 27.6 PG (ref 26.8–34.3)
MCHC RBC AUTO-ENTMCNC: 31.5 G/DL (ref 31.4–37.4)
MCV RBC AUTO: 88 FL (ref 82–98)
PHOSPHATE SERPL-MCNC: 3.6 MG/DL (ref 2.3–4.1)
PLATELET # BLD AUTO: 451 THOUSANDS/UL (ref 149–390)
PMV BLD AUTO: 8.1 FL (ref 8.9–12.7)
POTASSIUM SERPL-SCNC: 3.8 MMOL/L (ref 3.5–5.3)
PROT SERPL-MCNC: 5.5 G/DL (ref 6.4–8.4)
PROTHROMBIN TIME: 16.3 SECONDS (ref 11.6–14.5)
RBC # BLD AUTO: 3.26 MILLION/UL (ref 3.81–5.12)
RH BLD: NEGATIVE
SL CV LV EF: 60
SODIUM SERPL-SCNC: 136 MMOL/L (ref 135–147)
SPECIMEN EXPIRATION DATE: NORMAL
WBC # BLD AUTO: 20.41 THOUSAND/UL (ref 4.31–10.16)

## 2023-01-27 PROCEDURE — 0Y6C0Z3 DETACHMENT AT RIGHT UPPER LEG, LOW, OPEN APPROACH: ICD-10-PCS | Performed by: SURGERY

## 2023-01-27 PROCEDURE — 0HBHXZZ EXCISION OF RIGHT UPPER LEG SKIN, EXTERNAL APPROACH: ICD-10-PCS | Performed by: SURGERY

## 2023-01-27 PROCEDURE — 05HY33Z INSERTION OF INFUSION DEVICE INTO UPPER VEIN, PERCUTANEOUS APPROACH: ICD-10-PCS | Performed by: STUDENT IN AN ORGANIZED HEALTH CARE EDUCATION/TRAINING PROGRAM

## 2023-01-27 RX ORDER — PROPOFOL 10 MG/ML
INJECTION, EMULSION INTRAVENOUS AS NEEDED
Status: DISCONTINUED | OUTPATIENT
Start: 2023-01-27 | End: 2023-01-27

## 2023-01-27 RX ORDER — FENTANYL CITRATE/PF 50 MCG/ML
25 SYRINGE (ML) INJECTION
Status: DISCONTINUED | OUTPATIENT
Start: 2023-01-27 | End: 2023-01-27 | Stop reason: HOSPADM

## 2023-01-27 RX ORDER — MAGNESIUM SULFATE HEPTAHYDRATE 40 MG/ML
2 INJECTION, SOLUTION INTRAVENOUS ONCE
Status: COMPLETED | OUTPATIENT
Start: 2023-01-27 | End: 2023-01-27

## 2023-01-27 RX ORDER — MAGNESIUM HYDROXIDE 1200 MG/15ML
LIQUID ORAL AS NEEDED
Status: DISCONTINUED | OUTPATIENT
Start: 2023-01-27 | End: 2023-01-27 | Stop reason: HOSPADM

## 2023-01-27 RX ORDER — OXYCODONE HCL 5 MG/5 ML
5 SOLUTION, ORAL ORAL
Status: DISCONTINUED | OUTPATIENT
Start: 2023-01-27 | End: 2023-01-28

## 2023-01-27 RX ORDER — FENTANYL CITRATE 50 UG/ML
INJECTION, SOLUTION INTRAMUSCULAR; INTRAVENOUS AS NEEDED
Status: DISCONTINUED | OUTPATIENT
Start: 2023-01-27 | End: 2023-01-27

## 2023-01-27 RX ORDER — SODIUM CHLORIDE 9 MG/ML
INJECTION, SOLUTION INTRAVENOUS CONTINUOUS PRN
Status: DISCONTINUED | OUTPATIENT
Start: 2023-01-27 | End: 2023-01-27

## 2023-01-27 RX ORDER — ONDANSETRON 2 MG/ML
4 INJECTION INTRAMUSCULAR; INTRAVENOUS ONCE AS NEEDED
Status: DISCONTINUED | OUTPATIENT
Start: 2023-01-27 | End: 2023-01-27 | Stop reason: HOSPADM

## 2023-01-27 RX ORDER — ONDANSETRON 2 MG/ML
INJECTION INTRAMUSCULAR; INTRAVENOUS AS NEEDED
Status: DISCONTINUED | OUTPATIENT
Start: 2023-01-27 | End: 2023-01-27

## 2023-01-27 RX ORDER — ROCURONIUM BROMIDE 10 MG/ML
INJECTION, SOLUTION INTRAVENOUS AS NEEDED
Status: DISCONTINUED | OUTPATIENT
Start: 2023-01-27 | End: 2023-01-27

## 2023-01-27 RX ADMIN — BUPROPION HYDROCHLORIDE 75 MG: 75 TABLET, FILM COATED ORAL at 08:45

## 2023-01-27 RX ADMIN — BUPROPION HYDROCHLORIDE 75 MG: 75 TABLET, FILM COATED ORAL at 18:31

## 2023-01-27 RX ADMIN — ROCURONIUM BROMIDE 50 MG: 10 INJECTION, SOLUTION INTRAVENOUS at 15:12

## 2023-01-27 RX ADMIN — MEROPENEM 2000 MG: 1 INJECTION, POWDER, FOR SOLUTION INTRAVENOUS at 00:49

## 2023-01-27 RX ADMIN — SODIUM CHLORIDE: 0.9 INJECTION, SOLUTION INTRAVENOUS at 16:25

## 2023-01-27 RX ADMIN — HYDRALAZINE HYDROCHLORIDE 20 MG: 20 INJECTION, SOLUTION INTRAMUSCULAR; INTRAVENOUS at 20:27

## 2023-01-27 RX ADMIN — HYDRALAZINE HYDROCHLORIDE 25 MG: 25 TABLET, FILM COATED ORAL at 05:53

## 2023-01-27 RX ADMIN — Medication 20 MG: at 08:48

## 2023-01-27 RX ADMIN — CLOPIDOGREL BISULFATE 75 MG: 75 TABLET ORAL at 08:45

## 2023-01-27 RX ADMIN — LISINOPRIL 40 MG: 20 TABLET ORAL at 08:45

## 2023-01-27 RX ADMIN — AMPICILLIN SODIUM 2000 MG: 2 INJECTION, POWDER, FOR SOLUTION INTRAMUSCULAR; INTRAVENOUS at 10:41

## 2023-01-27 RX ADMIN — FENTANYL CITRATE 50 MCG: 50 INJECTION INTRAMUSCULAR; INTRAVENOUS at 15:12

## 2023-01-27 RX ADMIN — AMPICILLIN SODIUM 2000 MG: 2 INJECTION, POWDER, FOR SOLUTION INTRAMUSCULAR; INTRAVENOUS at 18:30

## 2023-01-27 RX ADMIN — MAGNESIUM SULFATE HEPTAHYDRATE 2 G: 40 INJECTION, SOLUTION INTRAVENOUS at 12:15

## 2023-01-27 RX ADMIN — AMPICILLIN SODIUM 2000 MG: 2 INJECTION, POWDER, FOR SOLUTION INTRAMUSCULAR; INTRAVENOUS at 04:34

## 2023-01-27 RX ADMIN — HYDRALAZINE HYDROCHLORIDE 25 MG: 25 TABLET, FILM COATED ORAL at 13:51

## 2023-01-27 RX ADMIN — PROPOFOL 170 MG: 10 INJECTION, EMULSION INTRAVENOUS at 15:12

## 2023-01-27 RX ADMIN — AMPICILLIN SODIUM 2000 MG: 2 INJECTION, POWDER, FOR SOLUTION INTRAMUSCULAR; INTRAVENOUS at 22:04

## 2023-01-27 RX ADMIN — SERTRALINE HYDROCHLORIDE 100 MG: 100 TABLET, FILM COATED ORAL at 08:45

## 2023-01-27 RX ADMIN — FENTANYL CITRATE 50 MCG: 50 INJECTION INTRAMUSCULAR; INTRAVENOUS at 15:49

## 2023-01-27 RX ADMIN — SUGAMMADEX 200 MG: 100 INJECTION, SOLUTION INTRAVENOUS at 16:47

## 2023-01-27 RX ADMIN — WARFARIN SODIUM 3 MG: 3 TABLET ORAL at 18:31

## 2023-01-27 RX ADMIN — SODIUM CHLORIDE: 0.9 INJECTION, SOLUTION INTRAVENOUS at 14:53

## 2023-01-27 RX ADMIN — HYDRALAZINE HYDROCHLORIDE 20 MG: 20 INJECTION, SOLUTION INTRAMUSCULAR; INTRAVENOUS at 14:01

## 2023-01-27 RX ADMIN — AMLODIPINE BESYLATE 10 MG: 10 TABLET ORAL at 08:45

## 2023-01-27 RX ADMIN — OXYCODONE HYDROCHLORIDE 5 MG: 5 SOLUTION ORAL at 05:53

## 2023-01-27 RX ADMIN — OXYCODONE HYDROCHLORIDE 5 MG: 5 SOLUTION ORAL at 18:31

## 2023-01-27 RX ADMIN — METOPROLOL TARTRATE 25 MG: 25 TABLET, FILM COATED ORAL at 08:45

## 2023-01-27 RX ADMIN — MEROPENEM 2000 MG: 1 INJECTION, POWDER, FOR SOLUTION INTRAVENOUS at 10:32

## 2023-01-27 RX ADMIN — METOPROLOL TARTRATE 25 MG: 25 TABLET, FILM COATED ORAL at 20:27

## 2023-01-27 RX ADMIN — HYDRALAZINE HYDROCHLORIDE 25 MG: 25 TABLET, FILM COATED ORAL at 23:00

## 2023-01-27 RX ADMIN — THIAMINE HCL TAB 100 MG 100 MG: 100 TAB at 08:45

## 2023-01-27 RX ADMIN — MEROPENEM 2000 MG: 1 INJECTION, POWDER, FOR SOLUTION INTRAVENOUS at 18:30

## 2023-01-27 RX ADMIN — AMPICILLIN SODIUM 2000 MG: 2 INJECTION, POWDER, FOR SOLUTION INTRAMUSCULAR; INTRAVENOUS at 14:46

## 2023-01-27 RX ADMIN — HYDRALAZINE HYDROCHLORIDE 20 MG: 20 INJECTION, SOLUTION INTRAMUSCULAR; INTRAVENOUS at 03:27

## 2023-01-27 RX ADMIN — AMPICILLIN SODIUM 2000 MG: 2 INJECTION, POWDER, FOR SOLUTION INTRAMUSCULAR; INTRAVENOUS at 00:03

## 2023-01-27 RX ADMIN — ONDANSETRON 4 MG: 2 INJECTION INTRAMUSCULAR; INTRAVENOUS at 16:37

## 2023-01-27 RX ADMIN — ROCURONIUM BROMIDE 20 MG: 10 INJECTION, SOLUTION INTRAVENOUS at 15:58

## 2023-01-27 RX ADMIN — AMIODARONE HYDROCHLORIDE 400 MG: 200 TABLET ORAL at 08:45

## 2023-01-27 RX ADMIN — OXYCODONE HYDROCHLORIDE 5 MG: 5 SOLUTION ORAL at 23:00

## 2023-01-27 NOTE — PROGRESS NOTES
Progress Note - Infectious Disease   Agata Gayle 65 y o  female MRN: [id number]  Unit/Bed#: [place] [address] -01 Encounter: [id number]      Impression/Plan:  1   Sepsis, POA   Patient met sepsis criteria on presentation   Sources are 2 and 3  Course complicated by 4   Extensive resistance developing on prior cultures   2D echo technically difficult study   Extensive CT imaging without any focal findings initially   Clinical parameters overall improved except for leukocytosis   Ongoing white count likely related to the right stump, now downtrending/stable with bedside intervention  Antibiotics as below  Continue to trend fever curve/vitals  Repeat CBC and CMP tomorrow  Follow-up pending wound cultures  Maintain PICC line, cleared earlier today  Ongoing follow-up by vascular surgery  Ongoing follow-up by cardiology  Additional supportive care as per primary  Duration of therapy/final agents pending further culture data     2   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   Blood cultures have isolated Enterobacter and Enterococcus faecalis  Enterobacter resistant to Ertapenem but no carbapenemase present on confirmatory testing   My suspicion overall remains for infection involving her right AKA stump given prior wound cultures and now findings on CT imaging with contrast   Urinary source considered given isolation of Enterococcus   Consider also endovascular source given 4   2D echo limited  No other obvious ectopic focus of infection on extensive imaging   Repeat cultures cleared rapidly   Wound cultures from 1/24 with a few colonies of staph likely colonizing the open wound and yeast only in broth culture likely not significant  Transesophageal echo completed on 1/27 which was without vegetation  Underwent debridement of the right stump with vascular on 1/27  OR cultures pending  Anticipate transition to oral/IV regimen to complete 6 weeks of therapy for her stump infection  She will only need 2 weeks of therapy for her bloodstream infection  Continue meropenem for now  Continue ampicillin for now  Continue to trend fever curve/vitals  Repeat CBC and CMP tomorrow  Follow up pending cultures  Plan for prolonged antibiotic course for stump infection  We will adjust antibiotics further based on culture data  Maintain PICC line for now  We will arrange ID follow-up closer to discharge  Ongoing follow-up by vascular surgery  Additional interventions pending clinical course     3   Complicated urinary tract infection   Patient unable to provide any history  May Garza was grossly abnormal on presentation   Possibly contributed to presentation   Higher suspicion for wound infection as above  Course of therapy completed for this issue  Continue antibiotic as above  Monitor abdominal exam  Catheters as per primary     4   Cardiac arrest   Suspect that this may have been unrelated to the above   Patient with multiple other comorbidities as well   Ongoing care and work-up as per critical care/cardiology  Patient extubated 1/20/2023     5   Transaminitis   Acute elevation noted likely in the setting of 4   We will need to monitor LFTs however on meropenem  Stable  Antibiotic as above  Repeat LFTs tomorrow  Monitor abdominal exam     Above plan discussed very briefly with the patient at bedside      ID consult service will formally reevaluate this patient again on Monday  Please contact ID attending on call if questions in the interim      Antibiotics:  Meropenem 10  Ampicillin 5  Total antibiotic 10    24 Hour events:  Yesterday and overnight notes reviewed and no acute events noted  Notes reviewed from later today and patient underwent further debridement with vascular surgery and transesophageal echo was done which was negative for vegetation  Subjective:  Patient seen at bedside this morning and she denied having any nausea, vomiting, chest pain    She would drift in conversation and was not always consistent with answering questions  Objective:  Vitals:  Temp:  [97 2 °F (36 2 °C)-99 4 °F (37 4 °C)] 97 2 °F (36 2 °C)  HR:  [78-90] 83  Resp:  [16-20] 18  BP: (157-185)/(66-87) 183/87  SpO2:  [95 %-97 %] 95 %  Temp (24hrs), Av 3 °F (36 8 °C), Min:97 2 °F (36 2 °C), Max:99 4 °F (37 4 °C)  Current: Temperature: (!) 97 2 °F (36 2 °C)    Physical Exam:   General Appearance:   Alert, inconsistently interactive, nontoxic in no acute distress  Throat: Oropharynx moist without lesions  Lungs:   Clear to auscultation bilaterally; no wheezes, rhonchi or rales; respirations unlabored on room air   Heart:  RRR; no murmur, rub or gallop noted   Abdomen:   Soft, non-tender, non-distended, positive bowel sounds  Extremities: No clubbing, cyanosis; continued edema in her stumps bilaterally   Skin: No new rashes or lesions  No new draining wounds noted  Labs, Imaging, & Other studies:   All pertinent labs and imaging studies were personally reviewed as below  Results from last 7 days   Lab Units 23  1035 23  0622 23  0431   WBC Thousand/uL 20 41* 20 77* 20 53*   HEMOGLOBIN g/dL 9 0* 8 5* 8 6*   PLATELETS Thousands/uL 451* 532* 502*     Results from last 7 days   Lab Units 23  1035 23  0622 23  0431   POTASSIUM mmol/L 3 8 4 0 3 8   CHLORIDE mmol/L 103 106 111*   CO2 mmol/L 26 26 24   BUN mg/dL 12 14 13   CREATININE mg/dL 0 47* 0 55* 0 50*   EGFR ml/min/1 73sq m 106 101 104   CALCIUM mg/dL 7 5* 7 4* 7 5*   AST U/L 17 18 16   ALT U/L 12 13 10   ALK PHOS U/L 192* 176* 174*     Results from last 7 days   Lab Units 23  1703 23  0538   BLOOD CULTURE   --  No Growth After 5 Days  No Growth After 5 Days  GRAM STAIN RESULT  No Polys or Bacteria seen  1+ Polys  No bacteria seen  --    WOUND CULTURE  No growth  Few Colonies of  --        Lab interpretation/comments: White blood cell count stable today  LFTs unremarkable      Imaging interpretation/comments: Transesophageal echo negative for vegetation  Culture data: Blood cultures ultimately finalized as negative  Wound cultures from the ICU with a few colonies of staph aureus which were likely colonizing the open sores  No anaerobes  Broth culture only with yeast which I suspect is likely contamination  OR cultures from today pending

## 2023-01-27 NOTE — UTILIZATION REVIEW
Continued Stay Review    Date: 01/27/2023               Current Patient Class: IP Current Level of Care: Level 2 stepdown/HOT    HPI:62 y o  female initially admitted on 01/17/2023    Assessment/Plan:  Pt w/ difficulty w/ IV access this am, PICC placed  She wishes to proceed with AKA stump revision and LEON today w/ Vasc surg  Cont Meropenem and ampicillin  ID, cardiology following  Vital Signs: BP (!) 206/86   Pulse 80   Temp (!) 97 2 °F (36 2 °C) (Axillary)   Resp 18   Ht 5' 4" (1 626 m)   Wt 72 1 kg (159 lb)   SpO2 96%   BMI 27 29 kg/m²       Pertinent Labs/Diagnostic Results:       Results from last 7 days   Lab Units 01/27/23  1035 01/26/23  0622 01/25/23  0431 01/24/23  0657 01/23/23  0433 01/22/23  0624 01/21/23  0539   WBC Thousand/uL 20 41* 20 77* 20 53* 27 58* 29 57*   < > 15 66*   HEMOGLOBIN g/dL 9 0* 8 5* 8 6* 8 6* 8 5*   < > 7 2*   HEMATOCRIT % 28 6* 27 4* 26 7* 26 2* 26 1*   < > 21 3*   PLATELETS Thousands/uL 451* 532* 502* 495* 449*   < > 239   NEUTROS ABS Thousands/µL  --   --   --   --   --   --  10 70*    < > = values in this interval not displayed           Results from last 7 days   Lab Units 01/27/23  1035 01/26/23  0622 01/25/23  0431 01/24/23  0657 01/23/23  0433 01/22/23  0624   SODIUM mmol/L 136 139 142 145 141 141   POTASSIUM mmol/L 3 8 4 0 3 8 3 6 2 8* 3 6   CHLORIDE mmol/L 103 106 111* 111* 109* 110*   CO2 mmol/L 26 26 24 25 23 24   ANION GAP mmol/L 7 7 7 9 9 7   BUN mg/dL 12 14 13 10 12 15   CREATININE mg/dL 0 47* 0 55* 0 50* 0 46* 0 46* 0 51*   EGFR ml/min/1 73sq m 106 101 104 107 107 104   CALCIUM mg/dL 7 5* 7 4* 7 5* 7 5* 7 5* 7 2*   CALCIUM, IONIZED mmol/L  --   --   --   --  1 04*  --    MAGNESIUM mg/dL 1 6*  --  2 5 1 7* 1 8* 1 7*   PHOSPHORUS mg/dL 3 6  --   --  3 0 1 7* 2 6     Results from last 7 days   Lab Units 01/27/23  1035 01/26/23  0622 01/25/23  0431 01/24/23  0657 01/22/23  0624   AST U/L 17 18 16 11* 16   ALT U/L 12 13 10 12 19   ALK PHOS U/L 192* 176* 174* 187* 199*   TOTAL PROTEIN g/dL 5 5* 5 5* 5 6* 5 3* 4 4*   ALBUMIN g/dL 2 4* 2 5* 2 5* 2 4* 2 3*   TOTAL BILIRUBIN mg/dL 0 33 0 30 0 34 0 42 0 47   AMMONIA umol/L  --   --   --  38  --      Results from last 7 days   Lab Units 01/27/23  1225 01/27/23  0558 01/27/23  0007 01/26/23  2047 01/26/23  1744 01/26/23  1152 01/26/23  0813 01/26/23  0655 01/26/23  0020 01/25/23  1748 01/25/23  1159 01/25/23  0554   POC GLUCOSE mg/dl 91 106 124 131 187* 156* 150* 142* 103 271* 188* 220*     Results from last 7 days   Lab Units 01/27/23  1035 01/26/23  0622 01/25/23  0431 01/24/23  0657 01/23/23  0433 01/22/23  0624 01/21/23  0539   GLUCOSE RANDOM mg/dL 98 150* 195* 201* 172* 144* 84       Results from last 7 days   Lab Units 01/24/23  2125 01/24/23  1911 01/24/23  1754   HS TNI 0HR ng/L  --   --  21   HS TNI 2HR ng/L  --  21  --    HSTNI D2 ng/L  --  0  --    HS TNI 4HR ng/L 19  --   --    HSTNI D4 ng/L -2  --   --          Results from last 7 days   Lab Units 01/27/23  1035 01/26/23  0622 01/25/23  0431   PROTIME seconds 16 3* 18 4* 19 2*   INR  1 31* 1 53* 1 62*     Results from last 7 days   Lab Units 01/23/23  0433   TSH 3RD GENERATON uIU/mL 1 367     Results from last 7 days   Lab Units 01/25/23  0431   PROCALCITONIN ng/ml 0 09       Results from last 7 days   Lab Units 01/24/23  0843   FERRITIN ng/mL 910*           Results from last 7 days   Lab Units 01/24/23  1703 01/21/23  0538   BLOOD CULTURE   --  No Growth After 5 Days  No Growth After 5 Days     GRAM STAIN RESULT  No Polys or Bacteria seen  1+ Polys  No bacteria seen  --    WOUND CULTURE  Growth in Broth culture only Yeast species*  Few Colonies of  --                  Medications:   Scheduled Medications:  [MAR Hold] amiodarone, 400 mg, Oral, BID With Meals  [MAR Hold] amLODIPine, 10 mg, Oral, Daily  [MAR Hold] ampicillin, 2,000 mg, Intravenous, Q4H  [MAR Hold] buPROPion, 75 mg, Per NG Tube, BID  [MAR Hold] clopidogrel, 75 mg, Oral, Daily  [MAR Hold] hydrALAZINE, 25 mg, Oral, Q8H [address]  [MAR Hold] insulin glargine, 20 Units, Subcutaneous, QAM  [MAR Hold] insulin lispro, 2-12 Units, Subcutaneous, Q6H [address]  [MAR Hold] lisinopril, 40 mg, Oral, Daily  [MAR Hold] meropenem, 2,000 mg, Intravenous, Q8H  [MAR Hold] metoprolol tartrate, 25 mg, Oral, Q12H [address]  [MAR Hold] omeprazole (PRILOSEC) suspension 2 mg/mL, 20 mg, Per NG Tube, Daily  [MAR Hold] oxyCODONE, 5 mg, Oral, Q6H [address]  [MAR Hold] senna, 1 tablet, Oral, HS  [MAR Hold] sertraline, 100 mg, Oral, Daily  [MAR Hold] thiamine, 100 mg, Oral, Daily  [MAR Hold] warfarin, 3 mg, Oral, Daily (warfarin)      Continuous IV Infusions:     PRN Meds:  [MAR Hold] Acetaminophen, 650 mg, Oral, Q4H PRN  [MAR Hold] bisacodyl, 10 mg, Rectal, Daily PRN  [MAR Hold] hydrALAZINE, 20 mg, Intravenous, Q4H PRN 01/27 x 2  [MAR Hold] HYDROmorphone, 0 2 mg, Intravenous, Q4H PRN        Discharge Plan: D    Network Utilization Review Department  ATTENTION: Please call with any questions or concerns to [phone] and carefully listen to the prompts so that you are directed to the right person  All voicemails are confidential   Gretchendayana Downs all requests for admission clinical reviews, approved or denied determinations and any other requests to dedicated fax number below belonging to the campus where the patient is receiving treatment   List of dedicated fax numbers for the Facilities:  [address] [place] [address] DENIALS (Administrative/Medical Necessity) [phone]   [address] [place] [address] (Maternity/NICU/Pediatrics) [phone]   [address] [phone]   Charlotte Zimmerman [address] [phone]   [address] [place] [address] [place] [address] [place] [address] [phone] [address] [place] [phone]   [address] [phone]

## 2023-01-27 NOTE — PROGRESS NOTES
[facility]  Progress Note - [address] 1960, 65 y o  female MRN: [id number]  Unit/Bed#: [place] [address] -01 Encounter: [id number]  Primary Care Provider: ALEX Marks   Date and time admitted to hospital: 1/17/2023  3:48 PM    Bacteremia  Assessment & Plan  65year old female former smoker w/complicated medical hx including HTN, HLD, uncontrolled type II DM (A1c 9 8), JUSTIN, R hemispheric CVA, bilateral tissue loss s/p multiple vascular interventions, subsequently requiring b/l AKA  Patient now presents with sepsis, bacteremia and UTI with altered mental status/encephalopathy  Patient ultimately suffered Vfib arrest and remains intubated with plan for extubation  Vascular surgery consulted to r/o stump infection as source of bacteremia/sepsis  S/p L AKA 11/23/22 [facility])  S/p R AKA, wound debridement 12/1/22 [facility])  S/p R AKA wound/stump washout and VAC placement 12/16/22 [facility])  S/p R AKA wound/stump VAC change, L AKA stump washout/packing 12/19/22 ([facility])    Blood Cx: +Enterobacter cloacae, +Enterococcus faecalis   Urine Cx: +Enterococcus faecalis, +Citrobacter freundii, +Klebsiella variicola     INR: 1 3    Recommendations:  - S/p bilateral AKA w/subsequent bilateral revision/wash out  Prior L stump wound cultures grew enterobacter cloacae, which is now growing in blood culture  - On exam, left stump is well healed without erythema  R stump posterior wounds with healthy granulation tissue  Anterior stump scabbing with some bogginess  Now s/p I&D of anterior and posterior stump wounds  Anterior stump wound with gross purulence noted during drainage  Currently incision is clean, probes to bone  - CT w/contrast reviewed which suggests femur OM  - Patient with polymicrobial urine and blood cultures  - Patient remains stable   More alert this AM  Able to answer questions and partake in medical decisions  - Extensive conversation held with patient, daughter and sister  Options are to proceed with right AKA stump revision tomorrow in the operating room with possible simultaneous LEON vs foregoing any surgical procedure and continuing with palliative/conservative management with antibiotics and observation  We discussed that if surgery is not pursued, there is no way to completely resolve all underlying bone infection, and there is a chance that patient can become septic again which would be life-threatening  Patient wishes to proceed with surgical revision  A LEON will be performed at the same time  This was discussed with cardiology as well  - ID on board, appreciate recommendations   - Cardiology following as patient is now s/p arrest w/Vfib  Planning for LEON   - Patient on coumadin and Plavix at baseline  - D/w Dr Marcia Burnham    Subjective: Patient seen and examined  Difficulty with IV access this AM, PICC being placed  Patient wishes to proceed with intervention  Denies SOB    Vitals:  BP (!) 183/87 (BP Location: Left arm)   Pulse 83   Temp (!) 97 2 °F (36 2 °C) (Axillary)   Resp 18   Ht 5' 4" (1 626 m)   Wt 72 1 kg (159 lb)   SpO2 95%   BMI 27 29 kg/m²     I/Os:  I/O last 3 completed shifts:  In: -   Out: 1300 [Urine:1300]  No intake/output data recorded      Lab Results and Cultures:   CBC with diff:   Lab Results   Component Value Date    WBC 20 41 (H) 01/27/2023    HGB 9 0 (L) 01/27/2023    HCT 28 6 (L) 01/27/2023    MCV 88 01/27/2023     (H) 01/27/2023    MCH 27 6 01/27/2023    MCHC 31 5 01/27/2023    RDW 17 0 (H) 01/27/2023    MPV 8 1 (L) 01/27/2023    NRBC 0 01/26/2023   ,   BMP/CMP:  Lab Results   Component Value Date    SODIUM 139 01/26/2023    K 4 0 01/26/2023     01/26/2023    CO2 26 01/26/2023    BUN 14 01/26/2023    CREATININE 0 55 (L) 01/26/2023    CALCIUM 7 4 (L) 01/26/2023    AST 18 01/26/2023    ALT 13 01/26/2023    ALKPHOS 176 (H) 01/26/2023    EGFR 101 01/26/2023   ,   Lipid Panel: No results found for: CHOL,   Coags:   Lab Results   Component Value Date    PTT 57 (H) 01/17/2023    INR 1 31 (H) 01/27/2023   ,     Blood Culture:   Lab Results   Component Value Date    BLOODCX No Growth After 5 Days  01/21/2023    BLOODCX No Growth After 5 Days   01/21/2023   ,   Urinalysis:   Lab Results   Component Value Date    COLORU Yellow 01/17/2023    CLARITYU Cloudy 01/17/2023    SPECGRAV 1 025 01/17/2023    PHUR 6 0 01/17/2023    PHUR 6 5 02/23/2018    LEUKOCYTESUR Small (A) 01/17/2023    NITRITE Negative 01/17/2023    GLUCOSEU Negative 01/17/2023    KETONESU Negative 01/17/2023    BILIRUBINUR Negative 01/17/2023    BLOODU Large (A) 01/17/2023   ,   Urine Culture:   Lab Results   Component Value Date    URINECX >100,000 cfu/ml Enterococcus faecalis (A) 01/17/2023    URINECX <10,000 cfu/ml Citrobacter freundii (A) 01/17/2023    URINECX <10,000 cfu/ml Klebsiella variicola (A) 01/17/2023   ,   Wound Culure:   Lab Results   Component Value Date    WOUNDCULT No growth 01/24/2023    WOUNDCULT Few Colonies of 01/24/2023       Medications:  Current Facility-Administered Medications   Medication Dose Route Frequency   • Acetaminophen (TYLENOL) oral suspension 650 mg  650 mg Oral Q4H PRN   • amiodarone tablet 400 mg  400 mg Oral BID With Meals   • amLODIPine (NORVASC) tablet 10 mg  10 mg Oral Daily   • ampicillin (OMNIPEN) 2,000 mg in sodium chloride 0 9 % 100 mL IVPB  2,000 mg Intravenous Q4H   • bisacodyl (DULCOLAX) rectal suppository 10 mg  10 mg Rectal Daily PRN   • buPROPion (WELLBUTRIN) tablet 75 mg  75 mg Per NG Tube BID   • clopidogrel (PLAVIX) tablet 75 mg  75 mg Oral Daily   • hydrALAZINE (APRESOLINE) injection 20 mg  20 mg Intravenous Q4H PRN   • hydrALAZINE (APRESOLINE) tablet 25 mg  25 mg Oral Q8H [facility]   • HYDROmorphone (DILAUDID) injection 0 2 mg  0 2 mg Intravenous Q4H PRN   • insulin glargine (LANTUS) subcutaneous injection 20 Units 0 2 mL  20 Units Subcutaneous QAM   • insulin lispro (HumaLOG) 100 units/mL subcutaneous injection 2-12 Units 2-12 Units Subcutaneous Q6H [address]   • lisinopril (ZESTRIL) tablet 40 mg  40 mg Oral Daily   • meropenem (MERREM) 2,000 mg in sodium chloride 0 9 % 100 mL IVPB  2,000 mg Intravenous Q8H   • metoprolol tartrate (LOPRESSOR) tablet 25 mg  25 mg Oral Q12H [address]   • omeprazole (PRILOSEC) suspension 2 mg/mL  20 mg Per NG Tube Daily   • oxyCODONE (ROXICODONE) oral solution 5 mg  5 mg Oral Q6H [facility]   • senna (SENOKOT) tablet 8 6 mg  1 tablet Oral HS   • sertraline (ZOLOFT) tablet 100 mg  100 mg Oral Daily   • thiamine tablet 100 mg  100 mg Oral Daily   • warfarin (COUMADIN) tablet 3 mg  3 mg Oral Daily (warfarin)       Imaging:  Reviewed  Physical Exam:    General: Alert and oriented     CV: Regular rate   Respiratory: Normal effort   Abdominal: Soft, non-distended   Extremities: S/p B/l AKA  Neurologic: Grossly normal       Goldy Beat, PA-C  1/27/2023

## 2023-01-27 NOTE — QUICK NOTE
Chart reviewed, patient/family opted to proceed with surgery  Palliative will sign off at this time       D/w Dr Mayra Nieto MD  Palliative Medicine & Supportive Care  Internal Medicine  Available via [facility] Text  Office: [phone]  Fax: [phone]

## 2023-01-27 NOTE — NURSING NOTE
Patient with IV infiltration for a second time on this shift  Multiple attempts made for IV access including ultrasound guidance  Unable to place IV and obtain AM labs  Unable to complete IV abx  SLIM made aware  Will make day shift RN aware

## 2023-01-27 NOTE — QUICK NOTE
RN reports multiple attempts to place IV line were unsuccessful    Will discuss with attending consideration for PICC

## 2023-01-27 NOTE — ASSESSMENT & PLAN NOTE
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

## 2023-01-27 NOTE — ASSESSMENT & PLAN NOTE
65year old female former smoker w/complicated medical hx including HTN, HLD, uncontrolled type II DM (A1c 9 8), JUSTIN, R hemispheric CVA, bilateral tissue loss s/p multiple vascular interventions, subsequently requiring b/l AKA  Patient now presents with sepsis, bacteremia and UTI with altered mental status/encephalopathy  Patient ultimately suffered Vfib arrest and remains intubated with plan for extubation  Vascular surgery consulted to r/o stump infection as source of bacteremia/sepsis  S/p L AKA 11/23/22 [facility])  S/p R AKA, wound debridement 12/1/22 [facility])  S/p R AKA wound/stump washout and VAC placement 12/16/22 [facility])  S/p R AKA wound/stump VAC change, L AKA stump washout/packing 12/19/22 ([facility])    Blood Cx: +Enterobacter cloacae, +Enterococcus faecalis   Urine Cx: +Enterococcus faecalis, +Citrobacter freundii, +Klebsiella variicola     INR: 1 3    Recommendations:  - S/p bilateral AKA w/subsequent bilateral revision/wash out  Prior L stump wound cultures grew enterobacter cloacae, which is now growing in blood culture  - On exam, left stump is well healed without erythema  R stump posterior wounds with healthy granulation tissue  Anterior stump scabbing with some bogginess  Now s/p I&D of anterior and posterior stump wounds  Anterior stump wound with gross purulence noted during drainage  Currently incision is clean, probes to bone  - CT w/contrast reviewed which suggests femur OM  - Patient with polymicrobial urine and blood cultures  - Patient remains stable  More alert this AM  Able to answer questions and partake in medical decisions  - Extensive conversation held with patient, daughter and sister  Options are to proceed with right AKA stump revision tomorrow in the operating room with possible simultaneous LEON vs foregoing any surgical procedure and continuing with palliative/conservative management with antibiotics and observation    We discussed that if surgery is not pursued, there is no way to completely resolve all underlying bone infection, and there is a chance that patient can become septic again which would be life-threatening  Patient wishes to proceed with surgical revision  A LEON will be performed at the same time  This was discussed with cardiology as well  - ID on board, appreciate recommendations   - Cardiology following as patient is now s/p arrest w/Vfib   Planning for LEON   - Patient on coumadin and Plavix at baseline  - D/w Dr Arcadio Sun

## 2023-01-27 NOTE — OP NOTE
OPERATIVE REPORT  PATIENT NAME: Gopal Ha    :  1960  MRN: [id number]  Pt Location: [place] [address]    SURGERY DATE: 2023    Surgeon(s) and Role:  Panel 1:     * Gabriela Cage DO - Primary     * Melanie Guerra MD - Assisting  Panel 2:     * Emile Chamorro MD - Primary (cardiology)    Preop Diagnosis:  Peripheral vascular disease, unspecified ([facility] ) [I73 9]  R AKA stump osteomyelitis    Post-Op Diagnosis Codes:     * Peripheral vascular disease, unspecified ([facility] ) [I73 9]    Procedure(s):  Right - AMPUTATION REVISION STUMP  TRANSESOPHAGEAL ECHOCARDIOGRAM (LEON)- see cardiology note    Specimen(s):  ID Type Source Tests Collected by Time Destination   A : right femur Tissue Leg, Right ANAEROBIC CULTURE AND GRAM STAIN, FUNGAL CULTURE, CULTURE, TISSUE AND GRAM STAIN, AFB CULTURE WITH STAIN Johnathan Britton DO 2023 1610        Estimated Blood Loss:   75 mL    Drains:  NG/OG/Enteral Tube Nasogastric 18 Fr Left nare (Active)   Number of days: 0       External Urinary Catheter (Active)   Collection Container Canister and suction tubing (For Female) 23 0801   Securement Method for Male Tape 23 0801   Suction Pressure (mmHg) 80 mmHg 23 0801   Interventions Removed and skin assessed; Pericare performed;Device changed;Suction canister changed;Suction tubing changed 23 1201   Output (mL) 200 mL 23 1201   Number of days: 4       [REMOVED] NG/OG/Enteral Tube Orogastric Left mouth (Removed)   Placement Reverification Auscultation 23 1000   Site Assessment Clean;Dry; Intact 23 1000   Enteral feeding tube interventions Flushed 23 2000   Status Tube feed infusing 23 1000   Drainage Appearance Bile 23 0400   Intake (mL) 100 mL 23 0748   Output (mL) 300 mL 23 0400   Number of days: 3       [REMOVED] NG/OG/Enteral Tube Nasogastric Right nare (Removed)   Placement Reverification Auscultation 23 0801   Site Assessment Clean;Dry; Intact 01/25/23 0801   Enteral feeding tube interventions Flushed 01/25/23 0801   Status Tube feed infusing 01/25/23 0801   Drainage Appearance None 01/25/23 0801   Intake (mL) 60 mL 01/25/23 1720   Number of days: 5       [REMOVED] Urethral Catheter Temperature probe 16 Fr  (Removed)   Amt returned on insertion(mL) 50 mL 01/17/23 1641   Reasons to continue Urinary Catheter  Accurate I&O assessment in critically ill patients (48 hr  max) 01/21/23 0800   Goal for Removal No longer needed- Will place order to discontinue 01/22/23 1116   Site Assessment Clean;Skin intact 01/22/23 0000   Jacinto Care Done 01/22/23 0855   Collection Container Standard drainage bag 01/22/23 0000   Securement Method Securing device (Describe) 01/22/23 0000   Output (mL) 300 mL 01/22/23 1100   Number of days: 5       Anesthesia Type:   General    Operative Indications:  Peripheral vascular disease, unspecified ([facility] ) [I73 9]  Patient is an unfortunate 81-year-old woman with complex hospital which she ultimately required bilateral above-knee amputations  She recently sustained a fall when she subsequently developed a wound to the anterior aspect of the distal right AKA stump  Of note her prior/initial right AKA stump  also had a wound along the closure site located more posteriorly  She was noted to have positive blood cultures  CT imaging was suggestive of/concern for osteomyelitis of transected right femur  Revision of Right AKA recommended  Procedure, risks, benefits, alternatives, and anticipated postoperative course discussed in detail with patient and family  All questions answered to their satisfaction  Patient was agreeable to proceed  Written consent was obtained on day of surgery from patient's daughter      Operative Findings:  No gross purulence encountered  The tissue surrounding transected femur demonstrated ossification  The bone was noted to be hard  An additional 4-5cm of femur transected    The more posteriorly located wound was sharply debrided using scalpel/cautery and packed with iodoform  Complications:   None    Procedure and Technique:  The patient was directly from the floor to the operating room  After consent was obtained from daughter via telephone the patient was transferred onto the operating room table  Patient was placed on the general anesthesia  She initially underwent a transesophageal echocardiogram performed by cardiology  No gross vegetations identified  Please refer to their procedure note for further details  Following ELON, the right above-knee amputation stent was prepped and draped in usual sterile fashion  A formal timeout was performed and all were in agreement  Skin incision using a #10 scalpel was carried out encompassing the anterior skin wound  The incision was deepened down through the subcutaneous tissue and muscle using the electrocautery  Incision was carried down onto the transected end of the femur  A periosteal elevator was next used to clear the soft tissue attachments from the distal end of the transected femur  An additional 4-5 cm of femur was next transected using the oscillating power saw  The specimen was sent for aerobic, anaerobic, fungal and AFB cultures  The wound was next copiously irrigated  Hemostasis was secured  The stump was closed in a multilayer fashion  Skin was reapproximated using skin staples  Attention was next turned towards the posterior wound  An elliptical skin incision using a #10 scalpel was carried out encompassing the skin wound  The wound was sharply debrided using electrocautery, and Metzenbaum scissors  The wound dimensions measured 3 cm x 2 cm x 1 cm  The wound was irrigated  Wound was packed with iodoform  The stump was dressed using Xeroform, gauze, Kerlix, and Ace wrap  Patient remained hemodynamically stable throughout the procedure    The patient was awakened, extubated and transferred to recovery room in stable condition     I was present for the entire procedure    Patient Disposition:  PACU         SIGNATURE: Fiona Marie,   DATE: January 27, 2023  TIME: 4:45 PM

## 2023-01-27 NOTE — PLAN OF CARE
Problem: INFECTION - ADULT  Goal: Absence or prevention of progression during hospitalization  Description: INTERVENTIONS:  Picc procedure and maintenance  - Assess and monitor for signs and symptoms of infection  - Monitor lab/diagnostic results  - Monitor all insertion sites, i e  indwelling lines, tubes, and drains  - Monitor endotracheal if appropriate and nasal secretions for changes in amount and color  - Aragon appropriate cooling/warming therapies per order  - Administer medications as ordered  - Instruct and encourage patient and family to use good hand hygiene technique  - Identify and instruct in appropriate isolation precautions for identified infection/condition  Outcome: Progressing

## 2023-01-27 NOTE — PROGRESS NOTES
[facility]  Progress Note - [address] 1960, 65 y o  female MRN: [id number]  Unit/Bed#: [place] [facility] 2 -01 Encounter: [id number]  Primary Care Provider: ALEX Medrano   Date and time admitted to hospital: 1/17/2023  3:48 PM  Addendum: Due to lack of IV access overnight and need for access for her planned surgical intervention a PICC line was placed  Consent was obtained  * Sepsis due to urinary tract infection ([place] [address] )  Assessment & Plan  She presented with altered mental status, generalized weakness, fall, and sepsis  Etiology possibly due to UTI , polymicrobial bacteremia, and 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 further worsened by her cardiac arrest    · Antibiotic management per infectious disease  Meropenem and ampicillin  · For AKA / LEON today    PAD (peripheral artery disease) ([facility] )  Assessment & Plan  S/p L AKA 11/23/22   S/p R AKA, wound debridement 12/1/22   S/p R AKA wound/stump washout and VAC placement 12/16/22   S/p R AKA wound/stump VAC change, L AKA stump washout/packing 12/19/22   Wound Cx 12/19/22: enterobacter cloacae     Plan:  · Vascular Surgery planning surgical intervention teodora for her right AKA stump revision (Imaging showed right femoral stump osteomyelitis)  · Vascular surgery recommends that plavix and coumadin be continued              Cardiac arrest [facility])  Assessment & Plan  Cardiac arrest presumably secondary to electrolyte abnormalities and/or severe sepsis  · Cardiology following  Recommend that we continue with amiodarone, Norvasc, hydralazine, lisinopril, metoprolol    History of CVA (cerebrovascular accident)  Assessment & Plan  Patient had stroke on 11/14 right hemispheric, with residual left-sided weakness      Hypokalemia  Assessment & Plan    Recent Labs     01/25/23  0431 01/26/23  0622 01/27/23  1035   K 3 8 4 0 3 8   MG 2 5  --  1 6*         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 [facility])  Assessment & Plan  Lab Results   Component Value Date    HGBA1C 9 8 (H) 10/25/2022       Recent Labs     23  1744 23  2047 23  0007 23  0558   POCGLU 187* 131 124 106       Blood Sugar Average: Last 72 hrs:  (P) 160 4     Home regimen: Lantus 60U HS, Novolog 2oU TID prior to meals  Inpatient regimen: Lantus 20UHS, sliding scale  Hold today's dose due to npo status given planned intervention        VTE Pharmacologic Prophylaxis:   Pharmacologic: Warfarin (Coumadin)  Mechanical VTE Prophylaxis in Place: Yes    Discussions with Specialists or Other Care Team Provider: nursing, vascular surgery    Education and Discussions with Family / Patient: patient    Current Length of Stay: 10 day(s)    Current Patient Status: Inpatient   Certification Statement: The patient will continue to require additional inpatient hospital stay due to surgical intervention, cathie, pain c ontrol    Discharge Plan: active    Code Status: Level 3 - DNAR and DNI      Subjective:   Patient seen and examined at bedside  She is amenable to her planned surgical intevention today  No new issues overnight  Objective:     Vitals:   Temp (24hrs), Av 3 °F (36 8 °C), Min:97 2 °F (36 2 °C), Max:99 4 °F (37 4 °C)    Temp:  [97 2 °F (36 2 °C)-99 4 °F (37 4 °C)] 97 2 °F (36 2 °C)  HR:  [78-90] 83  Resp:  [16-20] 18  BP: (157-185)/(66-87) 183/87  SpO2:  [95 %-97 %] 95 %  Body mass index is 27 29 kg/m²  Input and Output Summary (last 24 hours): Intake/Output Summary (Last 24 hours) at 2023 1207  Last data filed at 2023 0602  Gross per 24 hour   Intake --   Output 1300 ml   Net -1300 ml       Physical Exam:     Physical Exam  Vitals reviewed  Constitutional:       General: She is not in acute distress  HENT:      Head: Normocephalic        Comments: Bruise on forehead     Nose: Nose normal       Comments: ngt in place     Mouth/Throat:      Mouth: Mucous membranes are moist    Eyes:      General: No scleral icterus  Cardiovascular:      Rate and Rhythm: Normal rate  Pulmonary:      Effort: Pulmonary effort is normal  No respiratory distress  Breath sounds: No wheezing or rales  Abdominal:      General: There is no distension  Palpations: Abdomen is soft  Tenderness: There is no abdominal tenderness  Musculoskeletal:      Comments: Bilateral aka   Skin:     General: Skin is warm  Neurological:      Mental Status: She is alert  Mental status is at baseline  Psychiatric:         Mood and Affect: Mood normal          Behavior: Behavior normal        Additional Data:     Labs:    Results from last 7 days   Lab Units 01/27/23  1035 01/25/23  0431 01/24/23  0657 01/22/23  0624 01/21/23  0539   WBC Thousand/uL 20 41*   < > 27 58*   < > 15 66*   HEMOGLOBIN g/dL 9 0*   < > 8 6*   < > 7 2*   HEMATOCRIT % 28 6*   < > 26 2*   < > 21 3*   PLATELETS Thousands/uL 451*   < > 495*   < > 239   NEUTROS PCT %  --   --   --   --  69   LYMPHS PCT %  --   --   --   --  12*   LYMPHO PCT %  --   --  10*  --   --    MONOS PCT %  --   --   --   --  7   MONO PCT %  --   --  4  --   --    EOS PCT %  --   --  2  --  8*    < > = values in this interval not displayed       Results from last 7 days   Lab Units 01/27/23  1035   SODIUM mmol/L 136   POTASSIUM mmol/L 3 8   CHLORIDE mmol/L 103   CO2 mmol/L 26   BUN mg/dL 12   CREATININE mg/dL 0 47*   ANION GAP mmol/L 7   CALCIUM mg/dL 7 5*   ALBUMIN g/dL 2 4*   TOTAL BILIRUBIN mg/dL 0 33   ALK PHOS U/L 192*   ALT U/L 12   AST U/L 17   GLUCOSE RANDOM mg/dL 98     Results from last 7 days   Lab Units 01/27/23  1035   INR  1 31*     Results from last 7 days   Lab Units 01/27/23  0558 01/27/23  0007 01/26/23  2047 01/26/23  1744 01/26/23  1152 01/26/23  0813 01/26/23  0655 01/26/23  0020 01/25/23  1748 01/25/23  1159 01/25/23  0554 01/24/23  2348   POC GLUCOSE mg/dl 106 124 131 187* 156* 150* 142* 103 271* 188* 220* 140         Results from last 7 days   Lab Units 01/25/23  0431   PROCALCITONIN ng/ml 0 09           * I Have Reviewed All Lab Data Listed Above  * Additional Pertinent Lab Tests Reviewed: Nilda 67 Admission Reviewed      Lines:   Invasive Devices     Peripherally Inserted Central Catheter Line  Duration           PICC Line 45/05/82 Right Basilic <1 day          Drain  Duration           External Urinary Catheter 4 days    NG/OG/Enteral Tube Nasogastric Right nare 4 days                  as   Imaging:    Imaging Reports Reviewed Today Include: no new imaging    Recent Cultures (last 7 days):     Results from last 7 days   Lab Units 01/24/23  1703 01/21/23  0538   BLOOD CULTURE   --  No Growth After 5 Days  No Growth After 5 Days     GRAM STAIN RESULT  No Polys or Bacteria seen  1+ Polys  No bacteria seen  --    WOUND CULTURE  No growth  Few Colonies of  --        Last 24 Hours Medication List:   Current Facility-Administered Medications   Medication Dose Route Frequency Provider Last Rate   • Acetaminophen  650 mg Oral Q4H PRN Cayden Browning MD     • amiodarone  400 mg Oral BID With Meals Cayden Browning MD     • amLODIPine  10 mg Oral Daily Cayden Browning MD     • ampicillin  2,000 mg Intravenous Q4H Cayden Browning MD Stopped (01/27/23 1111)   • bisacodyl  10 mg Rectal Daily PRN Cayden Browning MD     • buPROPion  75 mg Per NG Tube BID Cayden Browning MD     • clopidogrel  75 mg Oral Daily Cayden Browning MD     • hydrALAZINE  20 mg Intravenous Q4H PRN Cayden Browning MD     • hydrALAZINE  25 mg Oral [facility] Cayden Browning MD     • HYDROmorphone  0 2 mg Intravenous Q4H PRN Cayden Browning MD     • insulin glargine  20 Units Subcutaneous QAKELLY Browning MD     • insulin lispro  2-12 Units Subcutaneous Q6H Luc Vail MD     • lisinopril  40 mg Oral Daily Cayden Browning MD     • magnesium sulfate  2 g Intravenous Once Andre Curry MD     • meropenem  2,000 mg Intravenous Q8H Jeri Henderson MD 2,000 mg (01/27/23 1032)   • metoprolol tartrate  25 mg Oral Q12H Luc Vail MD     • omeprazole (PRILOSEC) suspension 2 mg/mL  20 mg Per NG Tube Daily Jeri Henderson MD     • oxyCODONE  5 mg Oral Q6H [address] Jeri Henderson MD     • senna  1 tablet Oral HS Jeri Henderson MD     • sertraline  100 mg Oral Daily Jeri Henderson MD     • thiamine  100 mg Oral Daily Jeri Henderson MD     • warfarin  3 mg Oral Daily (warfarin) Jeri Henderson MD          Today, Patient Was Seen By: Judith Hodgson MD    ** Please Note: Dictation voice to text software may have been used in the creation of this document   **

## 2023-01-27 NOTE — ASSESSMENT & PLAN NOTE
Lab Results   Component Value Date    HGBA1C 9 8 (H) 10/25/2022       Recent Labs     01/26/23  1744 01/26/23 2047 01/27/23  0007 01/27/23  0558   POCGLU 187* 131 124 106       Blood Sugar Average: Last 72 hrs:  (P) 160 4     Home regimen: Lantus 60U HS, Novolog 2oU TID prior to meals  Inpatient regimen: Lantus 20UHS, sliding scale   Hold today's dose due to npo status given planned intervention

## 2023-01-28 LAB
ABO GROUP BLD BPU: NORMAL
ABO GROUP BLD BPU: NORMAL
ALBUMIN SERPL BCP-MCNC: 2.2 G/DL (ref 3.5–5)
ALP SERPL-CCNC: 180 U/L (ref 34–104)
ALT SERPL W P-5'-P-CCNC: 10 U/L (ref 7–52)
ANION GAP SERPL CALCULATED.3IONS-SCNC: 7 MMOL/L (ref 4–13)
AST SERPL W P-5'-P-CCNC: 15 U/L (ref 13–39)
BACTERIA WND AEROBE CULT: ABNORMAL
BASOPHILS # BLD AUTO: 0.12 THOUSANDS/ÂΜL (ref 0–0.1)
BASOPHILS NFR BLD AUTO: 0 % (ref 0–1)
BILIRUB SERPL-MCNC: 0.29 MG/DL (ref 0.2–1)
BPU ID: NORMAL
BPU ID: NORMAL
BUN SERPL-MCNC: 13 MG/DL (ref 5–25)
CALCIUM ALBUM COR SERPL-MCNC: 8.6 MG/DL (ref 8.3–10.1)
CALCIUM SERPL-MCNC: 7.2 MG/DL (ref 8.4–10.2)
CHLORIDE SERPL-SCNC: 104 MMOL/L (ref 96–108)
CO2 SERPL-SCNC: 26 MMOL/L (ref 21–32)
CREAT SERPL-MCNC: 0.54 MG/DL (ref 0.6–1.3)
CROSSMATCH: NORMAL
CROSSMATCH: NORMAL
EOSINOPHIL # BLD AUTO: 0.76 THOUSAND/ÂΜL (ref 0–0.61)
EOSINOPHIL NFR BLD AUTO: 3 % (ref 0–6)
ERYTHROCYTE [DISTWIDTH] IN BLOOD BY AUTOMATED COUNT: 17 % (ref 11.6–15.1)
GFR SERPL CREATININE-BSD FRML MDRD: 102 ML/MIN/1.73SQ M
GLUCOSE SERPL-MCNC: 106 MG/DL (ref 65–140)
GLUCOSE SERPL-MCNC: 107 MG/DL (ref 65–140)
GLUCOSE SERPL-MCNC: 108 MG/DL (ref 65–140)
GLUCOSE SERPL-MCNC: 110 MG/DL (ref 65–140)
GLUCOSE SERPL-MCNC: 159 MG/DL (ref 65–140)
GRAM STN SPEC: ABNORMAL
HCT VFR BLD AUTO: 26.6 % (ref 34.8–46.1)
HGB BLD-MCNC: 8.3 G/DL (ref 11.5–15.4)
IMM GRANULOCYTES # BLD AUTO: >0.5 THOUSAND/UL (ref 0–0.2)
IMM GRANULOCYTES NFR BLD AUTO: 2 % (ref 0–2)
LYMPHOCYTES # BLD AUTO: 1.56 THOUSANDS/ÂΜL (ref 0.6–4.47)
LYMPHOCYTES NFR BLD AUTO: 6 % (ref 14–44)
MAGNESIUM SERPL-MCNC: 1.8 MG/DL (ref 1.9–2.7)
MCH RBC QN AUTO: 27.9 PG (ref 26.8–34.3)
MCHC RBC AUTO-ENTMCNC: 31.2 G/DL (ref 31.4–37.4)
MCV RBC AUTO: 89 FL (ref 82–98)
MONOCYTES # BLD AUTO: 1.18 THOUSAND/ÂΜL (ref 0.17–1.22)
MONOCYTES NFR BLD AUTO: 4 % (ref 4–12)
NEUTROPHILS # BLD AUTO: 23.47 THOUSANDS/ÂΜL (ref 1.85–7.62)
NEUTS SEG NFR BLD AUTO: 85 % (ref 43–75)
NRBC BLD AUTO-RTO: 0 /100 WBCS
PHOSPHATE SERPL-MCNC: 3.7 MG/DL (ref 2.3–4.1)
PLATELET # BLD AUTO: 480 THOUSANDS/UL (ref 149–390)
PMV BLD AUTO: 8.4 FL (ref 8.9–12.7)
POTASSIUM SERPL-SCNC: 3.4 MMOL/L (ref 3.5–5.3)
PROT SERPL-MCNC: 4.9 G/DL (ref 6.4–8.4)
RBC # BLD AUTO: 2.98 MILLION/UL (ref 3.81–5.12)
RHODAMINE-AURAMINE STN SPEC: NORMAL
SODIUM SERPL-SCNC: 137 MMOL/L (ref 135–147)
UNIT DISPENSE STATUS: NORMAL
UNIT DISPENSE STATUS: NORMAL
UNIT PRODUCT CODE: NORMAL
UNIT PRODUCT CODE: NORMAL
UNIT PRODUCT VOLUME: 350 ML
UNIT PRODUCT VOLUME: 350 ML
UNIT RH: NORMAL
UNIT RH: NORMAL
WBC # BLD AUTO: 27.65 THOUSAND/UL (ref 4.31–10.16)

## 2023-01-28 RX ORDER — WARFARIN SODIUM 5 MG/1
5 TABLET ORAL
Status: DISCONTINUED | OUTPATIENT
Start: 2023-01-28 | End: 2023-01-30

## 2023-01-28 RX ORDER — OXYCODONE HYDROCHLORIDE 5 MG/1
5 TABLET ORAL EVERY 4 HOURS PRN
Status: DISCONTINUED | OUTPATIENT
Start: 2023-01-28 | End: 2023-02-03 | Stop reason: HOSPADM

## 2023-01-28 RX ORDER — OXYCODONE HYDROCHLORIDE 5 MG/1
5 TABLET ORAL EVERY 6 HOURS
Status: DISCONTINUED | OUTPATIENT
Start: 2023-01-28 | End: 2023-02-03 | Stop reason: HOSPADM

## 2023-01-28 RX ORDER — PANTOPRAZOLE SODIUM 40 MG/1
40 TABLET, DELAYED RELEASE ORAL
Status: DISCONTINUED | OUTPATIENT
Start: 2023-01-29 | End: 2023-02-03 | Stop reason: HOSPADM

## 2023-01-28 RX ORDER — ACETAMINOPHEN 325 MG/1
650 TABLET ORAL EVERY 6 HOURS PRN
Status: DISCONTINUED | OUTPATIENT
Start: 2023-01-28 | End: 2023-02-03 | Stop reason: HOSPADM

## 2023-01-28 RX ORDER — MAGNESIUM SULFATE HEPTAHYDRATE 40 MG/ML
2 INJECTION, SOLUTION INTRAVENOUS ONCE
Status: COMPLETED | OUTPATIENT
Start: 2023-01-28 | End: 2023-01-28

## 2023-01-28 RX ADMIN — Medication 20 MG: at 12:16

## 2023-01-28 RX ADMIN — MEROPENEM 2000 MG: 1 INJECTION, POWDER, FOR SOLUTION INTRAVENOUS at 10:56

## 2023-01-28 RX ADMIN — AMPICILLIN SODIUM 2000 MG: 2 INJECTION, POWDER, FOR SOLUTION INTRAMUSCULAR; INTRAVENOUS at 14:57

## 2023-01-28 RX ADMIN — HYDRALAZINE HYDROCHLORIDE 25 MG: 25 TABLET, FILM COATED ORAL at 14:57

## 2023-01-28 RX ADMIN — OXYCODONE HYDROCHLORIDE 5 MG: 5 SOLUTION ORAL at 12:08

## 2023-01-28 RX ADMIN — AMLODIPINE BESYLATE 10 MG: 10 TABLET ORAL at 08:38

## 2023-01-28 RX ADMIN — MEROPENEM 2000 MG: 1 INJECTION, POWDER, FOR SOLUTION INTRAVENOUS at 18:36

## 2023-01-28 RX ADMIN — AMPICILLIN SODIUM 2000 MG: 2 INJECTION, POWDER, FOR SOLUTION INTRAMUSCULAR; INTRAVENOUS at 21:48

## 2023-01-28 RX ADMIN — BUPROPION HYDROCHLORIDE 75 MG: 75 TABLET, FILM COATED ORAL at 17:00

## 2023-01-28 RX ADMIN — METOPROLOL TARTRATE 25 MG: 25 TABLET, FILM COATED ORAL at 20:18

## 2023-01-28 RX ADMIN — OXYCODONE 5 MG: 5 TABLET ORAL at 21:48

## 2023-01-28 RX ADMIN — AMIODARONE HYDROCHLORIDE 400 MG: 200 TABLET ORAL at 16:59

## 2023-01-28 RX ADMIN — MAGNESIUM SULFATE HEPTAHYDRATE 2 G: 40 INJECTION, SOLUTION INTRAVENOUS at 16:59

## 2023-01-28 RX ADMIN — AMPICILLIN SODIUM 2000 MG: 2 INJECTION, POWDER, FOR SOLUTION INTRAMUSCULAR; INTRAVENOUS at 18:36

## 2023-01-28 RX ADMIN — WARFARIN SODIUM 5 MG: 5 TABLET ORAL at 17:00

## 2023-01-28 RX ADMIN — THIAMINE HCL TAB 100 MG 100 MG: 100 TAB at 08:38

## 2023-01-28 RX ADMIN — METOPROLOL TARTRATE 25 MG: 25 TABLET, FILM COATED ORAL at 08:38

## 2023-01-28 RX ADMIN — HYDRALAZINE HYDROCHLORIDE 25 MG: 25 TABLET, FILM COATED ORAL at 05:47

## 2023-01-28 RX ADMIN — AMPICILLIN SODIUM 2000 MG: 2 INJECTION, POWDER, FOR SOLUTION INTRAMUSCULAR; INTRAVENOUS at 10:56

## 2023-01-28 RX ADMIN — INSULIN LISPRO 2 UNITS: 100 INJECTION, SOLUTION INTRAVENOUS; SUBCUTANEOUS at 18:36

## 2023-01-28 RX ADMIN — AMIODARONE HYDROCHLORIDE 400 MG: 200 TABLET ORAL at 08:38

## 2023-01-28 RX ADMIN — MEROPENEM 2000 MG: 1 INJECTION, POWDER, FOR SOLUTION INTRAVENOUS at 03:08

## 2023-01-28 RX ADMIN — BUPROPION HYDROCHLORIDE 75 MG: 75 TABLET, FILM COATED ORAL at 08:38

## 2023-01-28 RX ADMIN — OXYCODONE HYDROCHLORIDE 5 MG: 5 SOLUTION ORAL at 05:46

## 2023-01-28 RX ADMIN — OXYCODONE HYDROCHLORIDE 5 MG: 5 SOLUTION ORAL at 08:43

## 2023-01-28 RX ADMIN — HYDRALAZINE HYDROCHLORIDE 20 MG: 20 INJECTION, SOLUTION INTRAMUSCULAR; INTRAVENOUS at 13:18

## 2023-01-28 RX ADMIN — AMPICILLIN SODIUM 2000 MG: 2 INJECTION, POWDER, FOR SOLUTION INTRAMUSCULAR; INTRAVENOUS at 07:21

## 2023-01-28 RX ADMIN — OXYCODONE 5 MG: 5 TABLET ORAL at 16:59

## 2023-01-28 RX ADMIN — CLOPIDOGREL BISULFATE 75 MG: 75 TABLET ORAL at 08:38

## 2023-01-28 RX ADMIN — HYDRALAZINE HYDROCHLORIDE 25 MG: 25 TABLET, FILM COATED ORAL at 20:19

## 2023-01-28 RX ADMIN — SERTRALINE HYDROCHLORIDE 100 MG: 100 TABLET, FILM COATED ORAL at 08:38

## 2023-01-28 RX ADMIN — AMPICILLIN SODIUM 2000 MG: 2 INJECTION, POWDER, FOR SOLUTION INTRAMUSCULAR; INTRAVENOUS at 02:26

## 2023-01-28 RX ADMIN — LISINOPRIL 40 MG: 20 TABLET ORAL at 08:38

## 2023-01-28 NOTE — PROGRESS NOTES
Progress Note - Betzy Estimable 65 y o  female MRN: [id number]    Unit/Bed#: [place] [address] -01 Encounter: [id number]      Assessment:  64 y/o F w b/l AKA, admission for polymicrobial bacteremia c/b V fib arrest  Now s/p R AKA washout, revision on 1/27  R AKA dressing c/d/i  Plan:  Diet as tolerated  Continue iv abx ampicillini, meropenem  R AKA dressing change today  Rest of care per primary service    Subjective:   Reports RLE stump pain but denied fever, chills, chest pain, shortness of breath, nausea, vomiting, or abdominal pain this morning  Objective:     Vitals: Blood pressure (!) 177/72, pulse 83, temperature 98 2 °F (36 8 °C), resp  rate 15, height 5' 4" (1 626 m), weight 72 1 kg (159 lb), SpO2 96 %, not currently breastfeeding  ,Body mass index is 27 29 kg/m²  Intake/Output Summary (Last 24 hours) at 1/29/2023 0717  Last data filed at 1/29/2023 2228  Gross per 24 hour   Intake 1943 5 ml   Output 550 ml   Net 1393 5 ml       Physical Exam  General: NAD  HEENT: NC/AT  MMM  Cv: RRR     Lungs: normal effort  Ab: Soft, NT/ND  Ex: no CCE  Neuro: AAOx3    Scheduled Meds:  Current Facility-Administered Medications   Medication Dose Route Frequency Provider Last Rate   • acetaminophen  650 mg Oral Q6H PRN Raj Maddox MD     • amiodarone  400 mg Oral BID With Meals Miguel Shen MD     • amLODIPine  10 mg Oral Daily Miguel Shen MD     • ampicillin  2,000 mg Intravenous Q4H Miguel Shen MD Stopped (01/29/23 0341)   • bisacodyl  10 mg Rectal Daily PRN Miguel Shen MD     • buPROPion  75 mg Per NG Tube BID Miguel Shen MD     • clopidogrel  75 mg Oral Daily Miguel Shen MD     • hydrALAZINE  20 mg Intravenous Q4H PRN Miguel Shen MD     • hydrALAZINE  25 mg Oral [facility] Miguel Shen MD     • HYDROmorphone  0 2 mg Intravenous Q4H PRN Miguel Shen MD     • insulin glargine  20 Units Subcutaneous QAM Miguel Shen MD     • insulin lispro  2-12 Units Subcutaneous Q6H [facility] Ayana Carrillo MD     • lisinopril  40 mg Oral Daily Ayana Carrillo MD     • meropenem  2,000 mg Intravenous Q8H Ayana Carrillo MD Stopped (01/29/23 7818)   • metoprolol tartrate  25 mg Oral Q12H [facility] Ayana Carrillo MD     • oxyCODONE  5 mg Oral Q4H PRN Narcisa Macdonald MD     • oxyCODONE  5 mg Oral Q6H Narcisa Macdonald MD     • pantoprazole  40 mg Oral Early Morning Narcisa Macdonald MD     • senna  1 tablet Oral HS Ayana Carrillo MD     • sertraline  100 mg Oral Daily Ayana Carrillo MD     • thiamine  100 mg Oral Daily Ayana Carrillo MD     • warfarin  5 mg Oral Daily (warfarin) Narcisa Macdonald MD       Continuous Infusions:   PRN Meds: •  acetaminophen  •  bisacodyl  •  hydrALAZINE  •  HYDROmorphone  •  oxyCODONE      Invasive Devices     Peripherally Inserted Central Catheter Line  Duration           PICC Line 40/57/19 Right Basilic 1 day                Lab, Imaging and other studies: I have personally reviewed pertinent reports      VTE Pharmacologic Prophylaxis: Sequential compression device (Venodyne)   VTE Mechanical Prophylaxis: sequential compression device

## 2023-01-28 NOTE — ANESTHESIA POSTPROCEDURE EVALUATION
Post-Op Assessment Note    CV Status:  Stable  Pain Score: 1    Pain management: adequate     Mental Status:  Alert and awake   Hydration Status:  Euvolemic   PONV Controlled:  Controlled   Airway Patency:  Patent      Post Op Vitals Reviewed: Yes      Staff: Anesthesiologist         No notable events documented      BP     Temp     Pulse     Resp      SpO2      BP (!) 189/92   Pulse 81   Temp (!) 96 9 °F (36 1 °C)   Resp 18   Ht 5' 4" (1 626 m)   Wt 72 1 kg (159 lb)   SpO2 96%   BMI 27 29 kg/m²

## 2023-01-28 NOTE — ASSESSMENT & PLAN NOTE
History of HIT   On warfarin  Goal INR 2-3      Recent Labs     01/26/23  0622 01/27/23  1035   INR 1 53* 1 31*

## 2023-01-28 NOTE — PROGRESS NOTES
[address] [facility]  Progress Note - [address] 1960, 65 y o  female MRN: [id number]  Unit/Bed#: [place] [address] -01 Encounter: [id number]  Primary Care Provider: Martyn Kayser, CRNP   Date and time admitted to hospital: 1/17/2023  3:48 PM    Bacteremia  Assessment & Plan  65year old female former smoker w/complicated medical hx including HTN, HLD, uncontrolled type II DM (A1c 9 8), JUSTIN, R hemispheric CVA, bilateral tissue loss s/p multiple vascular interventions, subsequently requiring b/l AKA  Patient now presents with sepsis, bacteremia and UTI with altered mental status/encephalopathy  Patient ultimately suffered Vfib arrest and remains intubated with plan for extubation  Vascular surgery consulted to r/o stump infection as source of bacteremia/sepsis  S/p L AKA 11/23/22 [facility])  S/p R AKA, wound debridement 12/1/22 [facility])  S/p R AKA wound/stump washout and VAC placement 12/16/22 [facility])  S/p R AKA wound/stump VAC change, L AKA stump washout/packing 12/19/22 [facility])  S/p R AKA revision 1/27/23 ([place])    Blood Cx: +Enterobacter cloacae, +Enterococcus faecalis   Urine Cx: +Enterococcus faecalis, +Citrobacter freundii, +Klebsiella variicola     Recommendations:  - S/p bilateral AKA w/subsequent bilateral revision/wash out    - CT w/contrast reviewed which suggests femur OM  - Patient with polymicrobial urine and blood cultures  - Patient remains stable  More alert this AM  Able to answer questions and partake in medical decisions  - Underwent R AKA revision on 1/27 as well as LEON, no christina purulent material encountered intraoperatively  Material sent for culture  LEON with no evidence of infective endocarditis  - ID on board, appreciate recommendations   - Cardiology following as patient is now s/p arrest w/Vfib  - Diet per primary  - Patient on coumadin and Plavix at baseline    Subjective: No acute events overnight  Afebrile, hemodynamically stable   Endorsing pain at surgical site  No nausea, or vomiting, fevers or chills  Vitals:  /73 (BP Location: Right arm)   Pulse 81   Temp 98 1 °F (36 7 °C)   Resp 14   Ht 5' 4" (1 626 m)   Wt 72 1 kg (159 lb)   SpO2 94%   BMI 27 29 kg/m²     I/Os:  I/O last 3 completed shifts: In: 600 [I V :600]  Out: 575 [Urine:500; Blood:75]  No intake/output data recorded  Lab Results and Cultures:   CBC with diff:   Lab Results   Component Value Date    WBC 27 65 (H) 01/28/2023    HGB 8 3 (L) 01/28/2023    HCT 26 6 (L) 01/28/2023    MCV 89 01/28/2023     (H) 01/28/2023    MCH 27 9 01/28/2023    MCHC 31 2 (L) 01/28/2023    RDW 17 0 (H) 01/28/2023    MPV 8 4 (L) 01/28/2023    NRBC 0 01/28/2023   ,   BMP/CMP:  Lab Results   Component Value Date    SODIUM 137 01/28/2023    K 3 4 (L) 01/28/2023     01/28/2023    CO2 26 01/28/2023    BUN 13 01/28/2023    CREATININE 0 54 (L) 01/28/2023    CALCIUM 7 2 (L) 01/28/2023    AST 15 01/28/2023    ALT 10 01/28/2023    ALKPHOS 180 (H) 01/28/2023    EGFR 102 01/28/2023   ,   Lipid Panel: No results found for: CHOL,   Coags:   Lab Results   Component Value Date    PTT 57 (H) 01/17/2023    INR 1 31 (H) 01/27/2023   ,     Blood Culture:   Lab Results   Component Value Date    BLOODCX No Growth After 5 Days  01/21/2023    BLOODCX No Growth After 5 Days   01/21/2023   ,   Urinalysis:   Lab Results   Component Value Date    COLORU Yellow 01/17/2023    CLARITYU Cloudy 01/17/2023    SPECGRAV 1 025 01/17/2023    PHUR 6 0 01/17/2023    PHUR 6 5 02/23/2018    LEUKOCYTESUR Small (A) 01/17/2023    NITRITE Negative 01/17/2023    GLUCOSEU Negative 01/17/2023    KETONESU Negative 01/17/2023    BILIRUBINUR Negative 01/17/2023    BLOODU Large (A) 01/17/2023   ,   Urine Culture:   Lab Results   Component Value Date    URINECX >100,000 cfu/ml Enterococcus faecalis (A) 01/17/2023    URINECX <10,000 cfu/ml Citrobacter freundii (A) 01/17/2023    URINECX <10,000 cfu/ml Klebsiella variicola (A) 01/17/2023   ,   Wound Culure:   Lab Results   Component Value Date    WOUNDCULT Growth in Broth culture only Yeast species (A) 01/24/2023    WOUNDCULT Few Colonies of 01/24/2023       Medications:  Current Facility-Administered Medications   Medication Dose Route Frequency   • Acetaminophen (TYLENOL) oral suspension 650 mg  650 mg Oral Q4H PRN   • amiodarone tablet 400 mg  400 mg Oral BID With Meals   • amLODIPine (NORVASC) tablet 10 mg  10 mg Oral Daily   • ampicillin (OMNIPEN) 2,000 mg in sodium chloride 0 9 % 100 mL IVPB  2,000 mg Intravenous Q4H   • bisacodyl (DULCOLAX) rectal suppository 10 mg  10 mg Rectal Daily PRN   • buPROPion (WELLBUTRIN) tablet 75 mg  75 mg Per NG Tube BID   • clopidogrel (PLAVIX) tablet 75 mg  75 mg Oral Daily   • hydrALAZINE (APRESOLINE) injection 20 mg  20 mg Intravenous Q4H PRN   • hydrALAZINE (APRESOLINE) tablet 25 mg  25 mg Oral Q8H [address]   • HYDROmorphone (DILAUDID) injection 0 2 mg  0 2 mg Intravenous Q4H PRN   • insulin glargine (LANTUS) subcutaneous injection 20 Units 0 2 mL  20 Units Subcutaneous QAM   • insulin lispro (HumaLOG) 100 units/mL subcutaneous injection 2-12 Units  2-12 Units Subcutaneous Q6H [address]   • lisinopril (ZESTRIL) tablet 40 mg  40 mg Oral Daily   • meropenem (MERREM) 2,000 mg in sodium chloride 0 9 % 100 mL IVPB  2,000 mg Intravenous Q8H   • metoprolol tartrate (LOPRESSOR) tablet 25 mg  25 mg Oral Q12H MICHELLE   • omeprazole (PRILOSEC) suspension 2 mg/mL  20 mg Per NG Tube Daily   • oxyCODONE (ROXICODONE) oral solution 5 mg  5 mg Oral Q6H MICHELLE   • oxyCODONE (ROXICODONE) oral solution 5 mg  5 mg Oral Q3H PRN   • senna (SENOKOT) tablet 8 6 mg  1 tablet Oral HS   • sertraline (ZOLOFT) tablet 100 mg  100 mg Oral Daily   • thiamine tablet 100 mg  100 mg Oral Daily   • warfarin (COUMADIN) tablet 3 mg  3 mg Oral Daily (warfarin)       Imaging:  Reviewed      Physical Exam:  General: No acute distress, alert  CV: Well perfused, regular rate  Lungs: Normal work of breathing, no increased respiratory effort on RA  Abdomen: Soft, non-tender, non-distended  Extremities: Bilateral above knee amputations  Right surgical site with shadowing on ACE bandage    Skin: Warm, dry      Janet Aguayo MD  1/28/2023

## 2023-01-28 NOTE — PROGRESS NOTES
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 further worsened by her cardiac arrest    · Antibiotic management per infectious disease  Meropenem and ampicillin  · S/p AKA    PAD (peripheral artery disease) ([facility] [address] )  Assessment & Plan  S/p L AKA 11/23/22   S/p R AKA, wound debridement 12/1/22   S/p R AKA wound/stump washout and VAC placement 12/16/22   S/p R AKA wound/stump VAC change, L AKA stump washout/packing 12/19/22   Wound Cx 12/19/22: enterobacter cloacae     Plan:  · Vascular Surgery planning surgical intervention teodora for her right AKA stump revision (Imaging showed right femoral stump osteomyelitis)  · Vascular surgery recommends that plavix and coumadin be continued              Cardiac arrest [facility])  Assessment & Plan  Cardiac arrest presumably secondary to electrolyte abnormalities and/or severe sepsis  · Cardiology following  Recommend that we continue with amiodarone, Norvasc, hydralazine, lisinopril, metoprolol    Fall  Assessment & Plan  Presents from facility with altered mental status, status post fall  Trauma scans negative     History of CVA (cerebrovascular accident)  Assessment & Plan  Patient had stroke on 11/14 right hemispheric, with residual left-sided weakness  HIT (heparin-induced thrombocytopenia)  Assessment & Plan  History of HIT   On warfarin  Goal INR 2-3      Recent Labs     01/26/23  0622 01/27/23  1035   INR 1 53* 1 31*         Hypokalemia  Assessment & Plan  Replete PRN    Recent Labs     01/26/23  1842 23  1035 23  0445   K 4 0 3 8 3 4*   MG  --  1 6* 1 8*         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 [facility])  Assessment & Plan  Lab Results   Component Value Date    HGBA1C 9 8 (H) 10/25/2022       Recent Labs     23  1707 23  0004 23  0548 23  1207   POCGLU 109 106 107 110       Blood Sugar Average: Last 72 hrs:  (P) 143 8125     Home regimen: Lantus 60U HS, Novolog 2oU TID prior to meals  Inpatient regimen: Lantus 20UHS, sliding scale  Hold today's dose due to npo status given planned intervention      Esophageal reflux  Assessment & Plan  Continue PPI       VTE Pharmacologic Prophylaxis:   Pharmacologic: Warfarin (Coumadin)  Mechanical VTE Prophylaxis in Place: Yes    Discussions with Specialists or Other Care Team Provider: vascular surgery    Education and Discussions with Family / Patient: patient    Current Length of Stay: 11 day(s)    Current Patient Status: Inpatient   Certification Statement: The patient will continue to require additional inpatient hospital stay due to pain control, iv antibiotics    Discharge Plan: active    Code Status: Level 3 - DNAR and DNI      Subjective:   Patient seen and examined at bedside  Complaining of pain  Patient reports that she no longer wants NGT and would rather eat normally  Objective:     Vitals:   Temp (24hrs), Av 4 °F (36 3 °C), Min:96 5 °F (35 8 °C), Max:98 1 °F (36 7 °C)    Temp:  [96 5 °F (35 8 °C)-98 1 °F (36 7 °C)] 97 4 °F (36 3 °C)  HR:  [74-86] 85  Resp:  [14-19] 15  BP: (146-206)/(63-92) 146/63  SpO2:  [92 %-97 %] 96 %  Body mass index is 27 29 kg/m²  Input and Output Summary (last 24 hours): Intake/Output Summary (Last 24 hours) at 2023 1515  Last data filed at 2023 1356  Gross per 24 hour   Intake 900 ml   Output 75 ml   Net 825 ml       Physical Exam:     Physical Exam  Vitals reviewed  HENT:      Head: Normocephalic  Nose: Nose normal       Mouth/Throat:      Mouth: Mucous membranes are moist    Eyes:      General: No scleral icterus  Cardiovascular:      Rate and Rhythm: Normal rate  Pulmonary:      Effort: Pulmonary effort is normal  No respiratory distress  Abdominal:      General: There is no distension  Palpations: Abdomen is soft  Tenderness: There is no abdominal tenderness  Musculoskeletal:      Comments: Bilateral aka, dressing in place   Skin:     General: Skin is warm  Neurological:      Mental Status: She is alert  Mental status is at baseline  Psychiatric:         Mood and Affect: Mood normal          Behavior: Behavior normal        Additional Data:     Labs:    Results from last 7 days   Lab Units 01/28/23  0445   WBC Thousand/uL 27 65*   HEMOGLOBIN g/dL 8 3*   HEMATOCRIT % 26 6*   PLATELETS Thousands/uL 480*   NEUTROS PCT % 85*   LYMPHS PCT % 6*   MONOS PCT % 4   EOS PCT % 3     Results from last 7 days   Lab Units 01/28/23  0445   SODIUM mmol/L 137   POTASSIUM mmol/L 3 4*   CHLORIDE mmol/L 104   CO2 mmol/L 26   BUN mg/dL 13   CREATININE mg/dL 0 54*   ANION GAP mmol/L 7   CALCIUM mg/dL 7 2*   ALBUMIN g/dL 2 2*   TOTAL BILIRUBIN mg/dL 0 29   ALK PHOS U/L 180*   ALT U/L 10   AST U/L 15   GLUCOSE RANDOM mg/dL 108     Results from last 7 days   Lab Units 01/27/23  1035   INR  1 31*     Results from last 7 days   Lab Units 01/28/23  1207 01/28/23  0548 01/28/23  0004 01/27/23  1707 01/27/23  1225 01/27/23  0558 01/27/23  0007 01/26/23  2047 01/26/23  1744 01/26/23  1152 01/26/23  0813 01/26/23  0655   POC GLUCOSE mg/dl 110 107 106 109 91 106 124 131 187* 156* 150* 142*         Results from last 7 days   Lab Units 01/25/23  0431   PROCALCITONIN ng/ml 0 09           * I Have Reviewed All Lab Data Listed Above  * Additional Pertinent Lab Tests Reviewed:  All Labs For Current Hospital Admission Reviewed      Lines:   Invasive Devices     Peripherally Inserted Central Catheter Line  Duration PICC Line 45/00/88 Right Basilic 1 day          Drain  Duration           External Urinary Catheter 5 days    NG/OG/Enteral Tube Nasogastric 18 Fr Left nare <1 day                   Imaging:    Imaging Reports Reviewed Today Include: no new imaging    Recent Cultures (last 7 days):     Results from last 7 days   Lab Units 01/27/23  1610 01/24/23  1703   GRAM STAIN RESULT  2+ Polys  No bacteria seen No Polys or Bacteria seen  1+ Polys  No bacteria seen   WOUND CULTURE   --  Growth in Broth culture only Candida albicans*  Few Colonies of       Last 24 Hours Medication List:   Current Facility-Administered Medications   Medication Dose Route Frequency Provider Last Rate   • Acetaminophen  650 mg Oral Q4H PRN Yakov Richards MD     • amiodarone  400 mg Oral BID With Meals Yakov Richards MD     • amLODIPine  10 mg Oral Daily Yakov Richards MD     • ampicillin  2,000 mg Intravenous Q4H Yakov Richards MD 2,000 mg (01/28/23 9901)   • bisacodyl  10 mg Rectal Daily PRN Yakov Richards MD     • buPROPion  75 mg Per NG Tube BID Yakov Richards MD     • clopidogrel  75 mg Oral Daily Yakov Richards MD     • hydrALAZINE  20 mg Intravenous Q4H PRN Yakov Richards MD     • hydrALAZINE  25 mg Oral [facility] Yakov Richards MD     • HYDROmorphone  0 2 mg Intravenous Q4H PRN Yakov Richards MD     • insulin glargine  20 Units Subcutaneous QAM Yakov Richards MD     • insulin lispro  2-12 Units Subcutaneous Q6H [address] Yakov Richards MD     • lisinopril  40 mg Oral Daily Yakov Richards MD     • meropenem  2,000 mg Intravenous Rox Runner, MD Stopped (01/28/23 2085)   • metoprolol tartrate  25 mg Oral Q12H [address] Yakov Richards MD     • omeprazole (PRILOSEC) suspension 2 mg/mL  20 mg Per NG Tube Daily Yakov Richards MD     • oxyCODONE  5 mg Oral Q6H [address] Yakov Richards MD     • oxyCODONE  5 mg Oral Q3H PRN Yakov Richards MD     • senna  1 tablet Oral HS Yakov Richards MD     • sertraline  100 mg Oral Daily Yakov Richards MD     • thiamine  100 mg Oral Daily Gabrielle Ren MD     • warfarin  5 mg Oral Daily (warfarin) Madhuri Elam MD          Today, Patient Was Seen By: Ethan Leo MD    ** Please Note: Dictation voice to text software may have been used in the creation of this document   **

## 2023-01-28 NOTE — CASE MANAGEMENT
Case Management Discharge Planning Note    Patient name [id number] [place] [address]  Location [facility] 2 /[address] Chan* MRN [id number]  : 1960 Date 2023       Current Admission Date: 2023  Current Admission Diagnosis:Sepsis due to urinary tract infection [facility])   Patient Active Problem List    Diagnosis Date Noted   • Osteomyelitis ([address] ) 2023   • Altered mental status 2023   • Forehead laceration 2023   • Bacteremia 2023   • Cardiac arrest ([address] ) 2023   • Troponin level elevated 2023   • Hypomagnesemia 2023   • Sepsis due to urinary tract infection ([address] ) 2023   • Fall 2023   • Mild protein-calorie malnutrition ([address] ) 2022   • Diarrhea 2022   • CVA (cerebral vascular accident) ([address] ) 2022   • HIT (heparin-induced thrombocytopenia) 2022   • Diabetic ulcer of heel associated with diabetes mellitus due to underlying condition ([address] ) 11/10/2022   • Anemia 10/30/2022   • Generalized edema due to fluid overload 10/27/2022   • PAD (peripheral artery disease) ([address] ) 10/25/2022   • Non healing left heel wound 10/22/2022   • Hypertensive urgency 10/22/2022   • Hypokalemia 10/22/2022   • Wound of lower extremity 10/21/2022   • Epigastric pain 2022   • Type 2 diabetes mellitus with hyperglycemia, with long-term current use of insulin ([address] ) 10/18/2021   • Hyperparathyroidism ([address] ) 10/18/2021   • 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 ([address] ) 2021   • 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   • Moderate protein-calorie malnutrition ([address] ) 2020   • Acute colitis 2020   • Arthritis 2019   • Depression, recurrent ([address] ) 2018   • Class 3 severe obesity due to excess calories with serious comorbidity and body mass index (BMI) of 40 0 to 44 9 in adult ([facility] ) 2018   • Esophageal reflux 2018   • DM (diabetes mellitus) type II, controlled, with peripheral vascular disorder ([place] [address] ) 02/23/2018   • Diabetic peripheral neuropathy ([place] [address] ) 95/43/4347   • Systolic murmur 41/27/1674   • Stroke ([place] [address] ) 12/14/2015   • History of CVA (cerebrovascular accident) 12/14/2015   • Anxiety 03/19/2015   • Vitamin D deficiency 03/19/2015   • Benign essential hypertension 08/28/2012   • Familial combined hyperlipidemia 08/28/2012      LOS (days): 11  Geometric Mean LOS (GMLOS) (days): 5 00  Days to GMLOS:-5 7     OBJECTIVE:  Risk of Unplanned Readmission Score: 43 12         Current admission status: Inpatient   Preferred Pharmacy:   [facility] [id number] [place] [address]  Phone: [phone] Fax: [phone]    Primary Care Provider: ALEX Carlton    Primary Insurance: [id number]  Secondary Insurance:     DISCHARGE DETAILS:     Additional Comments: Per chart review, pt s/p R AKA revision and LEON on 1/27  [address] discussions to continue pending pt's progress and recovery  CM department to follow

## 2023-01-28 NOTE — ASSESSMENT & PLAN NOTE
Replete PRN    Recent Labs     01/26/23  0622 01/27/23  1035 01/28/23  0445   K 4 0 3 8 3 4*   MG  --  1 6* 1 8*

## 2023-01-28 NOTE — PLAN OF CARE
Problem: MOBILITY - ADULT  Goal: Maintain or return to baseline ADL function  Description: INTERVENTIONS:  -  Assess patient's ability to carry out ADLs; assess patient's baseline for ADL function and identify physical deficits which impact ability to perform ADLs (bathing, care of mouth/teeth, toileting, grooming, dressing, etc )  - Assess/evaluate cause of self-care deficits   - Assess range of motion  - Assess patient's mobility; develop plan if impaired  - Assess patient's need for assistive devices and provide as appropriate  - Encourage maximum independence but intervene and supervise when necessary  - Involve family in performance of ADLs  - Assess for home care needs following discharge   - Consider OT consult to assist with ADL evaluation and planning for discharge  - Provide patient education as appropriate  Outcome: Progressing  Goal: Maintains/Returns to pre admission functional level  Description: INTERVENTIONS:  - Perform BMAT or MOVE assessment daily    - Set and communicate daily mobility goal to care team and patient/family/caregiver  - Collaborate with rehabilitation services on mobility goals if consulted  - Perform Range of Motion 4 times a day  - Reposition patient every 2 hours    - Dangle patient  times a day  - Stand patient  times a day  - Ambulate patient  times a day  - Out of bed to chair  times a day   - Out of bed for meals  times a day  - Out of bed for toileting  - Record patient progress and toleration of activity level   Outcome: Progressing     Problem: Prexisting or High Potential for Compromised Skin Integrity  Goal: Skin integrity is maintained or improved  Description: INTERVENTIONS:  - Identify patients at risk for skin breakdown  - Assess and monitor skin integrity  - Assess and monitor nutrition and hydration status  - Monitor labs   - Assess for incontinence   - Turn and reposition patient  - Assist with mobility/ambulation  - Relieve pressure over bony prominences  - Avoid friction and shearing  - Provide appropriate hygiene as needed including keeping skin clean and dry  - Evaluate need for skin moisturizer/barrier cream  - Collaborate with interdisciplinary team   - Patient/family teaching  - Consider wound care consult   Outcome: Progressing     Problem: PAIN - ADULT  Goal: Verbalizes/displays adequate comfort level or baseline comfort level  Description: Interventions:  - Encourage patient to monitor pain and request assistance  - Assess pain using appropriate pain scale  - Administer analgesics based on type and severity of pain and evaluate response  - Implement non-pharmacological measures as appropriate and evaluate response  - Consider cultural and social influences on pain and pain management  - Notify physician/advanced practitioner if interventions unsuccessful or patient reports new pain  Outcome: Progressing     Problem: INFECTION - ADULT  Goal: Absence or prevention of progression during hospitalization  Description: INTERVENTIONS:  - Assess and monitor for signs and symptoms of infection  - Monitor lab/diagnostic results  - Monitor all insertion sites, i e  indwelling lines, tubes, and drains  - Monitor endotracheal if appropriate and nasal secretions for changes in amount and color  - Serena appropriate cooling/warming therapies per order  - Administer medications as ordered  - Instruct and encourage patient and family to use good hand hygiene technique  - Identify and instruct in appropriate isolation precautions for identified infection/condition  Outcome: Progressing     Problem: SAFETY ADULT  Goal: Maintain or return to baseline ADL function  Description: INTERVENTIONS:  -  Assess patient's ability to carry out ADLs; assess patient's baseline for ADL function and identify physical deficits which impact ability to perform ADLs (bathing, care of mouth/teeth, toileting, grooming, dressing, etc )  - Assess/evaluate cause of self-care deficits   - Assess range of motion  - Assess patient's mobility; develop plan if impaired  - Assess patient's need for assistive devices and provide as appropriate  - Encourage maximum independence but intervene and supervise when necessary  - Involve family in performance of ADLs  - Assess for home care needs following discharge   - Consider OT consult to assist with ADL evaluation and planning for discharge  - Provide patient education as appropriate  Outcome: Progressing  Goal: Maintains/Returns to pre admission functional level  Description: INTERVENTIONS:  - Perform BMAT or MOVE assessment daily    - Set and communicate daily mobility goal to care team and patient/family/caregiver  - Collaborate with rehabilitation services on mobility goals if consulted  - Perform Range of Motion 4 times a day  - Reposition patient every 2 hours    - Dangle patient  times a day  - Stand patient  times a day  - Ambulate patient  times a day  - Out of bed to chair  times a day   - Out of bed for meals  times a day  - Out of bed for toileting  - Record patient progress and toleration of activity level   Outcome: Progressing  Goal: Patient will remain free of falls  Description: INTERVENTIONS:  - Educate patient/family on patient safety including physical limitations  - Instruct patient to call for assistance with activity   - Consult OT/PT to assist with strengthening/mobility   - Keep Call bell within reach  - Keep bed low and locked with side rails adjusted as appropriate  - Keep care items and personal belongings within reach  - Initiate and maintain comfort rounds  - Make Fall Risk Sign visible to staff  - Offer Toileting every 2 Hours, in advance of need  - Initiate/Maintain bed alarm  - Obtain necessary fall risk management equipment: alarms  - Apply yellow socks and bracelet for high fall risk patients  - Consider moving patient to room near nurses station  Outcome: Progressing     Problem: DISCHARGE PLANNING  Goal: Discharge to home or other facility with appropriate resources  Description: INTERVENTIONS:  - Identify barriers to discharge w/patient and caregiver  - Arrange for needed discharge resources and transportation as appropriate  - Identify discharge learning needs (meds, wound care, etc )  - Arrange for interpretive services to assist at discharge as needed  - Refer to Case Management Department for coordinating discharge planning if the patient needs post-hospital services based on physician/advanced practitioner order or complex needs related to functional status, cognitive ability, or social support system  Outcome: Progressing     Problem: Knowledge Deficit  Goal: Patient/family/caregiver demonstrates understanding of disease process, treatment plan, medications, and discharge instructions  Description: Complete learning assessment and assess knowledge base  Interventions:  - Provide teaching at level of understanding  - Provide teaching via preferred learning methods  Outcome: Progressing     Problem: Nutrition/Hydration-ADULT  Goal: Nutrient/Hydration intake appropriate for improving, restoring or maintaining nutritional needs  Description: Monitor and assess patient's nutrition/hydration status for malnutrition  Collaborate with interdisciplinary team and initiate plan and interventions as ordered  Monitor patient's weight and dietary intake as ordered or per policy  Utilize nutrition screening tool and intervene as necessary  Determine patient's food preferences and provide high-protein, high-caloric foods as appropriate       INTERVENTIONS:  - Monitor oral intake, urinary output, labs, and treatment plans  - Assess nutrition and hydration status and recommend course of action  - Evaluate amount of meals eaten  - Assist patient with eating if necessary   - Allow adequate time for meals  - Recommend/ encourage appropriate diets, oral nutritional supplements, and vitamin/mineral supplements  - Order, calculate, and assess calorie counts as needed  - Recommend, monitor, and adjust tube feedings and TPN/PPN based on assessed needs  - Assess need for intravenous fluids  - Provide specific nutrition/hydration education as appropriate  - Include patient/family/caregiver in decisions related to nutrition  Outcome: Progressing     Problem: COPING  Goal: Pt/Family able to verbalize concerns and demonstrate effective coping strategies  Description: INTERVENTIONS:  - Assist patient/family to identify coping skills, available support systems and cultural and spiritual values  - Provide emotional support, including active listening and acknowledgement of concerns of patient and caregivers  - Reduce environmental stimuli, as able  - Provide patient education  - Assess for spiritual pain/suffering and initiate spiritual care, including notification of Pastoral Care or chidi based community as needed  - Assess effectiveness of coping strategies  Outcome: Progressing  Goal: Will report anxiety at manageable levels  Description: INTERVENTIONS:  - Administer medication as ordered  - Teach and encourage coping skills  - Provide emotional support  - Assess patient/family for anxiety and ability to cope  Outcome: Progressing     Problem: INFECTION - ADULT  Goal: Absence of fever/infection during neutropenic period  Description: INTERVENTIONS:  - Monitor WBC    Outcome: Completed

## 2023-01-28 NOTE — QUICK NOTE
Post Op Check:    Patient endorsing right thigh pain at surgical site  They denied any nausea, chest pain, or shortness of breath  Temp:  [96 5 °F (35 8 °C)-98 6 °F (37 °C)] 96 9 °F (36 1 °C)  HR:  [74-90] 81  Resp:  [14-20] 18  BP: (157-206)/(66-92) 189/92     No intake/output data recorded  General: NAD  HENT: NC MMM  Neck: supple, no JVD  CV: nl rate  Lungs: nl wob  No resp distress  ABD: Soft, nontender, nondistended  Extrem: Bilateral above knee amputations, right surgical site with shadowing on ACE wrap  Neuro: Alert    Plan:  Diet Dysphagia/Modified Consistency; Dysphagia 2-Mechanical Soft;  Thin Liquid  Continue to monitor  Pain and nausea control PRN    Melba Mena MD

## 2023-01-28 NOTE — ASSESSMENT & PLAN NOTE
Lab Results   Component Value Date    HGBA1C 9 8 (H) 10/25/2022       Recent Labs     01/27/23  1707 01/28/23  0004 01/28/23  0548 01/28/23  1207   POCGLU 109 106 107 110       Blood Sugar Average: Last 72 hrs:  (P) 143 8125     Home regimen: Lantus 60U HS, Novolog 2oU TID prior to meals  Inpatient regimen: Lantus 20UHS, sliding scale   Hold today's dose due to npo status given planned intervention

## 2023-01-28 NOTE — ASSESSMENT & PLAN NOTE
65year old female former smoker w/complicated medical hx including HTN, HLD, uncontrolled type II DM (A1c 9 8), JUSTIN, R hemispheric CVA, bilateral tissue loss s/p multiple vascular interventions, subsequently requiring b/l AKA  Patient now presents with sepsis, bacteremia and UTI with altered mental status/encephalopathy  Patient ultimately suffered Vfib arrest and remains intubated with plan for extubation  Vascular surgery consulted to r/o stump infection as source of bacteremia/sepsis  S/p L AKA 11/23/22 [facility])  S/p R AKA, wound debridement 12/1/22 [facility])  S/p R AKA wound/stump washout and VAC placement 12/16/22 [facility])  S/p R AKA wound/stump VAC change, L AKA stump washout/packing 12/19/22 [facility])  S/p R AKA revision 1/27/23 ([facility])    Blood Cx: +Enterobacter cloacae, +Enterococcus faecalis   Urine Cx: +Enterococcus faecalis, +Citrobacter freundii, +Klebsiella variicola     Recommendations:  - S/p bilateral AKA w/subsequent R AKA revision   - Patient remains stable  More alert this AM  Able to answer questions and partake in medical decisions  - Underwent R AKA revision on 1/27 as well as LEON, no christina purulent material encountered intraoperatively  Material sent for culture  LEON with no evidence of infective endocarditis  - Dressing taken down over the weekend  Incision is c/d/i    - Daily dressing changes per nursing    - ID on board, appreciate recommendations   - Cardiology following as patient is now s/p arrest w/Vfib  - Diet per primary  - Patient on coumadin and Plavix at baseline  - Will continue to follow peripherally for stump checks

## 2023-01-28 NOTE — ASSESSMENT & PLAN NOTE
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

## 2023-01-29 LAB
ANION GAP SERPL CALCULATED.3IONS-SCNC: 6 MMOL/L (ref 4–13)
BASOPHILS # BLD AUTO: 0.12 THOUSANDS/ÂΜL (ref 0–0.1)
BASOPHILS NFR BLD AUTO: 1 % (ref 0–1)
BUN SERPL-MCNC: 8 MG/DL (ref 5–25)
CALCIUM SERPL-MCNC: 7 MG/DL (ref 8.4–10.2)
CHLORIDE SERPL-SCNC: 105 MMOL/L (ref 96–108)
CO2 SERPL-SCNC: 27 MMOL/L (ref 21–32)
CREAT SERPL-MCNC: 0.55 MG/DL (ref 0.6–1.3)
EOSINOPHIL # BLD AUTO: 1.73 THOUSAND/ÂΜL (ref 0–0.61)
EOSINOPHIL NFR BLD AUTO: 8 % (ref 0–6)
ERYTHROCYTE [DISTWIDTH] IN BLOOD BY AUTOMATED COUNT: 16.9 % (ref 11.6–15.1)
GFR SERPL CREATININE-BSD FRML MDRD: 101 ML/MIN/1.73SQ M
GLUCOSE SERPL-MCNC: 105 MG/DL (ref 65–140)
GLUCOSE SERPL-MCNC: 125 MG/DL (ref 65–140)
GLUCOSE SERPL-MCNC: 178 MG/DL (ref 65–140)
GLUCOSE SERPL-MCNC: 91 MG/DL (ref 65–140)
GLUCOSE SERPL-MCNC: 92 MG/DL (ref 65–140)
GLUCOSE SERPL-MCNC: 95 MG/DL (ref 65–140)
HCT VFR BLD AUTO: 24.3 % (ref 34.8–46.1)
HGB BLD-MCNC: 7.6 G/DL (ref 11.5–15.4)
IMM GRANULOCYTES # BLD AUTO: 0.28 THOUSAND/UL (ref 0–0.2)
IMM GRANULOCYTES NFR BLD AUTO: 1 % (ref 0–2)
INR PPP: 1.96 (ref 0.84–1.19)
LYMPHOCYTES # BLD AUTO: 1.94 THOUSANDS/ÂΜL (ref 0.6–4.47)
LYMPHOCYTES NFR BLD AUTO: 8 % (ref 14–44)
MAGNESIUM SERPL-MCNC: 2 MG/DL (ref 1.9–2.7)
MCH RBC QN AUTO: 27.3 PG (ref 26.8–34.3)
MCHC RBC AUTO-ENTMCNC: 31.3 G/DL (ref 31.4–37.4)
MCV RBC AUTO: 87 FL (ref 82–98)
MONOCYTES # BLD AUTO: 1.22 THOUSAND/ÂΜL (ref 0.17–1.22)
MONOCYTES NFR BLD AUTO: 5 % (ref 4–12)
NEUTROPHILS # BLD AUTO: 17.89 THOUSANDS/ÂΜL (ref 1.85–7.62)
NEUTS SEG NFR BLD AUTO: 77 % (ref 43–75)
NRBC BLD AUTO-RTO: 0 /100 WBCS
PLATELET # BLD AUTO: 436 THOUSANDS/UL (ref 149–390)
PMV BLD AUTO: 8.5 FL (ref 8.9–12.7)
POTASSIUM SERPL-SCNC: 3 MMOL/L (ref 3.5–5.3)
PROTHROMBIN TIME: 22.3 SECONDS (ref 11.6–14.5)
RBC # BLD AUTO: 2.78 MILLION/UL (ref 3.81–5.12)
SODIUM SERPL-SCNC: 138 MMOL/L (ref 135–147)
WBC # BLD AUTO: 23.18 THOUSAND/UL (ref 4.31–10.16)

## 2023-01-29 RX ORDER — HYDROMORPHONE HCL/PF 1 MG/ML
0.5 SYRINGE (ML) INJECTION ONCE
Status: COMPLETED | OUTPATIENT
Start: 2023-01-29 | End: 2023-01-29

## 2023-01-29 RX ORDER — POTASSIUM CHLORIDE 20MEQ/15ML
20 LIQUID (ML) ORAL
Status: COMPLETED | OUTPATIENT
Start: 2023-01-29 | End: 2023-01-29

## 2023-01-29 RX ADMIN — OXYCODONE 5 MG: 5 TABLET ORAL at 05:03

## 2023-01-29 RX ADMIN — OXYCODONE 5 MG: 5 TABLET ORAL at 21:18

## 2023-01-29 RX ADMIN — CLOPIDOGREL BISULFATE 75 MG: 75 TABLET ORAL at 09:11

## 2023-01-29 RX ADMIN — POTASSIUM CHLORIDE 20 MEQ: 20 SOLUTION ORAL at 09:35

## 2023-01-29 RX ADMIN — METOPROLOL TARTRATE 25 MG: 25 TABLET, FILM COATED ORAL at 09:10

## 2023-01-29 RX ADMIN — METOPROLOL TARTRATE 25 MG: 25 TABLET, FILM COATED ORAL at 21:19

## 2023-01-29 RX ADMIN — THIAMINE HCL TAB 100 MG 100 MG: 100 TAB at 09:10

## 2023-01-29 RX ADMIN — OXYCODONE 5 MG: 5 TABLET ORAL at 09:31

## 2023-01-29 RX ADMIN — HYDROMORPHONE HYDROCHLORIDE 0.5 MG: 1 INJECTION, SOLUTION INTRAMUSCULAR; INTRAVENOUS; SUBCUTANEOUS at 15:42

## 2023-01-29 RX ADMIN — AMPICILLIN SODIUM 2000 MG: 2 INJECTION, POWDER, FOR SOLUTION INTRAMUSCULAR; INTRAVENOUS at 22:22

## 2023-01-29 RX ADMIN — INSULIN LISPRO 2 UNITS: 100 INJECTION, SOLUTION INTRAVENOUS; SUBCUTANEOUS at 12:00

## 2023-01-29 RX ADMIN — HYDRALAZINE HYDROCHLORIDE 25 MG: 25 TABLET, FILM COATED ORAL at 21:19

## 2023-01-29 RX ADMIN — AMPICILLIN SODIUM 2000 MG: 2 INJECTION, POWDER, FOR SOLUTION INTRAMUSCULAR; INTRAVENOUS at 18:42

## 2023-01-29 RX ADMIN — HYDRALAZINE HYDROCHLORIDE 25 MG: 25 TABLET, FILM COATED ORAL at 05:03

## 2023-01-29 RX ADMIN — POTASSIUM CHLORIDE 20 MEQ: 20 SOLUTION ORAL at 13:01

## 2023-01-29 RX ADMIN — MEROPENEM 2000 MG: 1 INJECTION, POWDER, FOR SOLUTION INTRAVENOUS at 03:11

## 2023-01-29 RX ADMIN — WARFARIN SODIUM 5 MG: 5 TABLET ORAL at 17:00

## 2023-01-29 RX ADMIN — AMIODARONE HYDROCHLORIDE 400 MG: 200 TABLET ORAL at 09:10

## 2023-01-29 RX ADMIN — HYDRALAZINE HYDROCHLORIDE 20 MG: 20 INJECTION, SOLUTION INTRAMUSCULAR; INTRAVENOUS at 09:10

## 2023-01-29 RX ADMIN — BUPROPION HYDROCHLORIDE 75 MG: 75 TABLET, FILM COATED ORAL at 17:00

## 2023-01-29 RX ADMIN — AMPICILLIN SODIUM 2000 MG: 2 INJECTION, POWDER, FOR SOLUTION INTRAMUSCULAR; INTRAVENOUS at 15:40

## 2023-01-29 RX ADMIN — HYDRALAZINE HYDROCHLORIDE 20 MG: 20 INJECTION, SOLUTION INTRAMUSCULAR; INTRAVENOUS at 22:29

## 2023-01-29 RX ADMIN — MEROPENEM 2000 MG: 1 INJECTION, POWDER, FOR SOLUTION INTRAVENOUS at 10:58

## 2023-01-29 RX ADMIN — POTASSIUM CHLORIDE 20 MEQ: 20 SOLUTION ORAL at 12:00

## 2023-01-29 RX ADMIN — AMPICILLIN SODIUM 2000 MG: 2 INJECTION, POWDER, FOR SOLUTION INTRAMUSCULAR; INTRAVENOUS at 02:40

## 2023-01-29 RX ADMIN — HYDRALAZINE HYDROCHLORIDE 25 MG: 25 TABLET, FILM COATED ORAL at 15:42

## 2023-01-29 RX ADMIN — AMPICILLIN SODIUM 2000 MG: 2 INJECTION, POWDER, FOR SOLUTION INTRAMUSCULAR; INTRAVENOUS at 06:13

## 2023-01-29 RX ADMIN — MEROPENEM 2000 MG: 1 INJECTION, POWDER, FOR SOLUTION INTRAVENOUS at 18:42

## 2023-01-29 RX ADMIN — SERTRALINE HYDROCHLORIDE 100 MG: 100 TABLET, FILM COATED ORAL at 09:10

## 2023-01-29 RX ADMIN — AMIODARONE HYDROCHLORIDE 400 MG: 200 TABLET ORAL at 16:50

## 2023-01-29 RX ADMIN — AMLODIPINE BESYLATE 10 MG: 10 TABLET ORAL at 09:11

## 2023-01-29 RX ADMIN — SENNOSIDES 8.6 MG: 8.6 TABLET ORAL at 21:17

## 2023-01-29 RX ADMIN — PANTOPRAZOLE SODIUM 40 MG: 40 TABLET, DELAYED RELEASE ORAL at 05:03

## 2023-01-29 RX ADMIN — BUPROPION HYDROCHLORIDE 75 MG: 75 TABLET, FILM COATED ORAL at 09:11

## 2023-01-29 RX ADMIN — AMPICILLIN SODIUM 2000 MG: 2 INJECTION, POWDER, FOR SOLUTION INTRAMUSCULAR; INTRAVENOUS at 10:58

## 2023-01-29 RX ADMIN — LISINOPRIL 40 MG: 20 TABLET ORAL at 09:11

## 2023-01-29 RX ADMIN — OXYCODONE 5 MG: 5 TABLET ORAL at 16:50

## 2023-01-29 NOTE — ASSESSMENT & PLAN NOTE
History of HIT   On warfarin  Goal INR 2-3      Recent Labs     01/27/23  1035 01/29/23  0939   INR 1 31* 1 96*

## 2023-01-29 NOTE — ASSESSMENT & PLAN NOTE
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

## 2023-01-29 NOTE — SPEECH THERAPY NOTE
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 (HCC)    Hypokalemia    PAD (peripheral artery disease) (HCC)    Anemia    HIT (heparin-induced thrombocytopenia)    History of CVA (cerebrovascular accident)    Fall    Cardiac arrest ([address] )    Troponin level elevated    Hypomagnesemia    Bacteremia    Forehead laceration    Osteomyelitis (HCC)    Altered mental status       Past Medical History  Past Medical History:   Diagnosis Date   • Anxiety    • Depression    • Diabetes ([address] )    • Diabetes mellitus ([address] )    • Esophageal reflux     28 APR 2015 RESOLVED   • Hyperlipidemia    • Hypertension    • Low back pain     sciatica   • Pneumonia 2019   • Stroke ([address] ) 12/2015    small vessel, L frontal corona radiata   Rx asa 81, Lipitor 40, risk modification   • Vitamin D deficiency         Past Surgical History  Past Surgical History:   Procedure Laterality Date   • AMPUTATION ABOVE KNEE (AKA) Right 12/1/2022    Procedure: (AKA), PSB  BKA;  Surgeon: Chandan Mcdermott DO;  Location: [facility];  Service: Vascular   • ARTERIOGRAM Right 11/7/2022    Procedure: -Right lower extremity angiogram -Right CFA balloon angioplasty with [id number]  [employer]  -Right CFA DCB with 6x40 Bard Lutonix -Right CFA atherectomy with Jetstream and distal embolization protection with 7mm Spider FX -Right SFA/Pop angioplasty with 4x40 Chololate balloon -Right SFA/Pop DCB with 5x150 Bard Lutonix -Right SFA stent with [employer] x2 -R   • COLONOSCOPY     • IR AORTAGRAM WITH RUN-OFF  10/31/2022   • IR LOWER EXTREMITY ANGIOGRAM  11/10/2022   • IR LOWER EXTREMITY ANGIOGRAM  11/7/2022   • PA AMPUTATION THIGH THROUGH FEMUR ANY LEVEL Left 11/23/2022    Procedure: (AKA); Surgeon: Chris Ruiz, DO;  Location: [facility];  Service: Vascular   • PA NEGATIVE PRESSURE WOUND THERAPY DME </= 50 SQ CM Bilateral 12/19/2022    Procedure: Right above knee amputation washout; vac change; Left above knee amputation debridement; vac placement;  Surgeon: April Syed, DO;  Location: [facility];  Service: Vascular   • WOUND DEBRIDEMENT Right 12/16/2022    Procedure: AKA STUMP WASHOUT, Left AKA Staple removal  application of wound vac to Right AKA; Surgeon: Damián Boyer MD;  Location: [facility];  Service: Vascular         Subjective:  Patient alert and more verbal today  Stated she wanted an egg salad sandwich for lunch  Objective:  Post sx  patient seen for diet tolerance and potential upgrade  Seen at lunch  Both of her trays arrived  (The sandwich that I had ordered as well as her level 2 dysphagia tray)  Cut the sandwiches in quarters  Patient only ate a quarter of a slice of bread  Mastication was prolonged but adequate  She stated she just wanted the bread she has not had bread in a long time  Also had small amounts of macaroni and cheese and [place] lemon ice  Bolus control, formation, and transfer were WNL  Swallows were prompt no cough or wet vocal quality  Seems to prefer drinking and likes her diet cola  Also drinks the gluerna  Assessment:  Dentures are still at home but the patient was able to masticate items today [facility]  Minimal oral stage  No pharyngeal signs and symptoms  Plan/Recommendations:  Upgrade diet to dysphagia 3 dental soft and thin liquids  Will f/u

## 2023-01-29 NOTE — QUICK NOTE
Dressing change performed at bedside  Wound inspected and appears to be healing well  No infections or hematoma obvious  Posterior wound packed with iodinated packing strips  Xeroform gauze over incision  Covered with kerlix and ACE wrap            Karen Keller MD  General Surgery   01/29/23

## 2023-01-29 NOTE — PLAN OF CARE
Problem: MOBILITY - ADULT  Goal: Maintain or return to baseline ADL function  Description: INTERVENTIONS:  -  Assess patient's ability to carry out ADLs; assess patient's baseline for ADL function and identify physical deficits which impact ability to perform ADLs (bathing, care of mouth/teeth, toileting, grooming, dressing, etc )  - Assess/evaluate cause of self-care deficits   - Assess range of motion  - Assess patient's mobility; develop plan if impaired  - Assess patient's need for assistive devices and provide as appropriate  - Encourage maximum independence but intervene and supervise when necessary  - Involve family in performance of ADLs  - Assess for home care needs following discharge   - Consider OT consult to assist with ADL evaluation and planning for discharge  - Provide patient education as appropriate  1/29/2023 1148 by Matthias Auguste RN  Outcome: Progressing  1/29/2023 1145 by Matthias Auguste RN  Outcome: Progressing  1/29/2023 1144 by Matthias Auguste RN  Outcome: Progressing  Goal: Maintains/Returns to pre admission functional level  Description: INTERVENTIONS:  - Perform BMAT or MOVE assessment daily    - Set and communicate daily mobility goal to care team and patient/family/caregiver  - Collaborate with rehabilitation services on mobility goals if consulted  - Perform Range of Motion 4 times a day  - Reposition patient every 2 hours    - Dangle patient 4 times a day  - Stand patient 4 times a day  - Ambulate patient 4 times a day  - Out of bed to chair 4 times a day   - Out of bed for meals 4 times a day  - Out of bed for toileting  - Record patient progress and toleration of activity level   1/29/2023 1148 by Matthias Auguste RN  Outcome: Progressing  1/29/2023 1145 by Matthias Auguste RN  Outcome: Progressing  1/29/2023 1144 by Matthias Auguste RN  Outcome: Progressing     Problem: Prexisting or High Potential for Compromised Skin Integrity  Goal: Skin integrity is maintained or improved  Description: INTERVENTIONS:  - Identify patients at risk for skin breakdown  - Assess and monitor skin integrity  - Assess and monitor nutrition and hydration status  - Monitor labs   - Assess for incontinence   - Turn and reposition patient  - Assist with mobility/ambulation  - Relieve pressure over bony prominences  - Avoid friction and shearing  - Provide appropriate hygiene as needed including keeping skin clean and dry  - Evaluate need for skin moisturizer/barrier cream  - Collaborate with interdisciplinary team   - Patient/family teaching  - Consider wound care consult   1/29/2023 1148 by Erica Wilson RN  Outcome: Progressing  1/29/2023 1145 by Erica Wilson RN  Outcome: Progressing  1/29/2023 1144 by Erica Wilson RN  Outcome: Progressing     Problem: PAIN - ADULT  Goal: Verbalizes/displays adequate comfort level or baseline comfort level  Description: Interventions:  - Encourage patient to monitor pain and request assistance  - Assess pain using appropriate pain scale  - Administer analgesics based on type and severity of pain and evaluate response  - Implement non-pharmacological measures as appropriate and evaluate response  - Consider cultural and social influences on pain and pain management  - Notify physician/advanced practitioner if interventions unsuccessful or patient reports new pain  1/29/2023 1148 by Erica Wilson RN  Outcome: Progressing  1/29/2023 1145 by Erica Wilson RN  Outcome: Progressing  1/29/2023 1144 by Erica Wilson RN  Outcome: Progressing     Problem: INFECTION - ADULT  Goal: Absence or prevention of progression during hospitalization  Description: INTERVENTIONS:  - Assess and monitor for signs and symptoms of infection  - Monitor lab/diagnostic results  - Monitor all insertion sites, i e  indwelling lines, tubes, and drains  - Monitor endotracheal if appropriate and nasal secretions for changes in amount and color  - [place] appropriate cooling/warming therapies per order  - Administer medications as ordered  - Instruct and encourage patient and family to use good hand hygiene technique  - Identify and instruct in appropriate isolation precautions for identified infection/condition  1/29/2023 1148 by Alexus Diallo RN  Outcome: Progressing  1/29/2023 1145 by Alexus Diallo RN  Outcome: Progressing  1/29/2023 1144 by Alexus Diallo RN  Outcome: Progressing     Problem: SAFETY ADULT  Goal: Maintain or return to baseline ADL function  Description: INTERVENTIONS:  -  Assess patient's ability to carry out ADLs; assess patient's baseline for ADL function and identify physical deficits which impact ability to perform ADLs (bathing, care of mouth/teeth, toileting, grooming, dressing, etc )  - Assess/evaluate cause of self-care deficits   - Assess range of motion  - Assess patient's mobility; develop plan if impaired  - Assess patient's need for assistive devices and provide as appropriate  - Encourage maximum independence but intervene and supervise when necessary  - Involve family in performance of ADLs  - Assess for home care needs following discharge   - Consider OT consult to assist with ADL evaluation and planning for discharge  - Provide patient education as appropriate  1/29/2023 1148 by Alexus Diallo RN  Outcome: Progressing  1/29/2023 1145 by Alexus Diallo RN  Outcome: Progressing  1/29/2023 1144 by Alexus Diallo RN  Outcome: Progressing  Goal: Maintains/Returns to pre admission functional level  Description: INTERVENTIONS:  - Perform BMAT or MOVE assessment daily    - Set and communicate daily mobility goal to care team and patient/family/caregiver  - Collaborate with rehabilitation services on mobility goals if consulted  - Perform Range of Motion 4 times a day  - Reposition patient every 2 hours    - Dangle patient 4 times a day  - Stand patient 4 times a day  - Ambulate patient 4 times a day  - Out of bed to chair 4 times a day   - Out of bed for meals 4 times a day  - Out of bed for toileting  - Record patient progress and toleration of activity level   1/29/2023 1148 by March Severs, RN  Outcome: Progressing  1/29/2023 1145 by March Severs, RN  Outcome: Progressing  1/29/2023 1144 by March Severs, RN  Outcome: Progressing  Goal: Patient will remain free of falls  Description: INTERVENTIONS:  - Educate patient/family on patient safety including physical limitations  - Instruct patient to call for assistance with activity   - Consult OT/PT to assist with strengthening/mobility   - Keep Call bell within reach  - Keep bed low and locked with side rails adjusted as appropriate  - Keep care items and personal belongings within reach  - Initiate and maintain comfort rounds  - Make Fall Risk Sign visible to staff  - Offer Toileting every 2 Hours, in advance of need  - Initiate/Maintain bed alarm  - Obtain necessary fall risk management equipment: alarms  - Apply yellow socks and bracelet for high fall risk patients  - Consider moving patient to room near nurses station  1/29/2023 1148 by March Severs, RN  Outcome: Progressing  1/29/2023 1145 by March Severs, RN  Outcome: Progressing  1/29/2023 1144 by March Severs, RN  Outcome: Progressing     Problem: DISCHARGE PLANNING  Goal: Discharge to home or other facility with appropriate resources  Description: INTERVENTIONS:  - Identify barriers to discharge w/patient and caregiver  - Arrange for needed discharge resources and transportation as appropriate  - Identify discharge learning needs (meds, wound care, etc )  - Arrange for interpretive services to assist at discharge as needed  - Refer to Case Management Department for coordinating discharge planning if the patient needs post-hospital services based on physician/advanced practitioner order or complex needs related to functional status, cognitive ability, or social support system  1/29/2023 1148 by March Severs, RN  Outcome: Progressing  1/29/2023 1145 by March Severs, RN  Outcome: Progressing  1/29/2023 1144 by March Severs, RN  Outcome: Progressing     Problem: Knowledge Deficit  Goal: Patient/family/caregiver demonstrates understanding of disease process, treatment plan, medications, and discharge instructions  Description: Complete learning assessment and assess knowledge base  Interventions:  - Provide teaching at level of understanding  - Provide teaching via preferred learning methods  1/29/2023 1148 by Ludmila Gibson RN  Outcome: Progressing  1/29/2023 1145 by Ludmila Gibson RN  Outcome: Progressing  1/29/2023 1144 by Ludmila Gibson RN  Outcome: Progressing     Problem: Nutrition/Hydration-ADULT  Goal: Nutrient/Hydration intake appropriate for improving, restoring or maintaining nutritional needs  Description: Monitor and assess patient's nutrition/hydration status for malnutrition  Collaborate with interdisciplinary team and initiate plan and interventions as ordered  Monitor patient's weight and dietary intake as ordered or per policy  Utilize nutrition screening tool and intervene as necessary  Determine patient's food preferences and provide high-protein, high-caloric foods as appropriate       INTERVENTIONS:  - Monitor oral intake, urinary output, labs, and treatment plans  - Assess nutrition and hydration status and recommend course of action  - Evaluate amount of meals eaten  - Assist patient with eating if necessary   - Allow adequate time for meals  - Recommend/ encourage appropriate diets, oral nutritional supplements, and vitamin/mineral supplements  - Order, calculate, and assess calorie counts as needed  - Recommend, monitor, and adjust tube feedings and TPN/PPN based on assessed needs  - Assess need for intravenous fluids  - Provide specific nutrition/hydration education as appropriate  - Include patient/family/caregiver in decisions related to nutrition  1/29/2023 1148 by Ludmila Gibson RN  Outcome: Progressing  1/29/2023 1145 by Ludmila Gibson RN  Outcome: Progressing  1/29/2023 1144 by Ludmila Gibson RN  Outcome: Progressing     Problem: COPING  Goal: Pt/Family able to verbalize concerns and demonstrate effective coping strategies  Description: INTERVENTIONS:  - Assist patient/family to identify coping skills, available support systems and cultural and spiritual values  - Provide emotional support, including active listening and acknowledgement of concerns of patient and caregivers  - Reduce environmental stimuli, as able  - Provide patient education  - Assess for spiritual pain/suffering and initiate spiritual care, including notification of Pastoral Care or chidi based community as needed  - Assess effectiveness of coping strategies  1/29/2023 1148 by Yaneth Beth RN  Outcome: Progressing  1/29/2023 1145 by Yaneth Beth RN  Outcome: Progressing  1/29/2023 1144 by Yaneth Beth RN  Outcome: Progressing  Goal: Will report anxiety at manageable levels  Description: INTERVENTIONS:  - Administer medication as ordered  - Teach and encourage coping skills  - Provide emotional support  - Assess patient/family for anxiety and ability to cope  1/29/2023 1148 by Yaneth Beth RN  Outcome: Progressing  1/29/2023 1145 by Yaneth Beth RN  Outcome: Progressing  1/29/2023 1144 by Yaneth Beth RN  Outcome: Progressing     Problem: SKIN/TISSUE INTEGRITY - ADULT  Goal: Skin Integrity remains intact(Skin Breakdown Prevention)  Description: Assess:  -Perform [place] assessment every shift  -Clean and moisturize skin every 2 hours  -Inspect skin when repositioning, toileting, and assisting with ADLS  -Assess under medical devices such as picc every shift  -Assess extremities for adequate circulation and sensation     Bed Management:  -Have minimal linens on bed & keep smooth, unwrinkled  -Change linens as needed when moist or perspiring  -Avoid sitting or lying in one position for more than 2 hours while in bed  -Keep HOB at 30 degrees     Toileting:  -Offer bedside commode  -Assess for incontinence every 2 hours  -Use incontinent care products after each incontinent episode such as cleanser    Activity:  -Encourage activity and walks on unit  -Encourage or provide ROM exercises   -Turn and reposition patient every 2 Hours  -Use appropriate equipment to lift or move patient in bed      Skin Care:  -Avoid use of baby powder, tape, friction and shearing, hot water or constrictive clothing  -Relieve pressure over bony prominences using pillows, wedges  -Do not massage red bony areas    Next Steps:  -Teach patient strategies to minimize risks such as falling   -Consider consults to  interdisciplinary teams such as pt/ot  1/29/2023 1148 by Tiara Sales RN  Outcome: Progressing  1/29/2023 1145 by Tiara Sales RN  Outcome: Progressing  Goal: Incision(s), wounds(s) or drain site(s) healing without S/S of infection  Description: INTERVENTIONS  - Assess and document dressing, incision, wound bed, drain sites and surrounding tissue  - Provide patient and family education  - Perform skin care/dressing changes every daily  1/29/2023 1148 by Tiara Sales RN  Outcome: Progressing  1/29/2023 1145 by Tiara Sales RN  Outcome: Progressing

## 2023-01-29 NOTE — ASSESSMENT & PLAN NOTE
Lab Results   Component Value Date    HGBA1C 9 8 (H) 10/25/2022       Recent Labs     01/28/23  1742 01/29/23  0046 01/29/23  0625 01/29/23  1103   POCGLU 159* 92 91 178*       Blood Sugar Average: Last 72 hrs:  (P) 126     Home regimen: Lantus 60U HS, Novolog 2oU TID prior to meals  Inpatient regimen: Lantus 20UHS, sliding scale

## 2023-01-29 NOTE — ASSESSMENT & PLAN NOTE
S/p L AKA 11/23/22   S/p R AKA, wound debridement 12/1/22   S/p R AKA wound/stump washout and VAC placement 12/16/22   S/p R AKA wound/stump VAC change, L AKA stump washout/packing 12/19/22   Wound Cx 12/19/22: enterobacter cloacae     Plan:  · S/p Right - AMPUTATION REVISION STUMP

## 2023-01-29 NOTE — PROGRESS NOTES
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 further worsened by her cardiac arrest    · Antibiotic management per infectious disease  Meropenem and ampicillin  · S/p right amputation revision stump    [facility]peripheral artery disease) ([facility]  Assessment & Plan  S/p L AKA 11/23/22   S/p R AKA, wound debridement 12/1/22   S/p R AKA wound/stump washout and VAC placement 12/16/22   S/p R AKA wound/stump VAC change, L AKA stump washout/packing 12/19/22   Wound Cx 12/19/22: enterobacter cloacae     Plan:  · S/p Right - AMPUTATION REVISION STUMP            Cardiac arrest [facility])  Assessment & Plan  Cardiac arrest presumably secondary to electrolyte abnormalities and/or severe sepsis  · Cardiology following  Recommend that we continue with amiodarone, Norvasc, hydralazine, lisinopril, metoprolol    History of CVA (cerebrovascular accident)  Assessment & Plan  Patient had stroke on 11/14 right hemispheric, with residual left-sided weakness  HIT (heparin-induced thrombocytopenia)  Assessment & Plan  History of HIT   On warfarin  Goal INR 2-3      Recent Labs     01/27/23  1035 01/29/23  0939   INR 1 31* 1 96*         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 [facility])  Assessment & Plan  Lab Results   Component Value Date    HGBA1C 9 8 (H) 10/25/2022       Recent Labs     01/28/23  1742 01/29/23  0046 01/29/23  0625 01/29/23  1103   POCGLU 159* 92 91 178*       Blood Sugar Average: Last 72 hrs:  (P) 126     Home regimen: Lantus 60U HS, Novolog 2oU TID prior to meals  Inpatient regimen: Lantus 20UHS, sliding scale  Esophageal reflux  Assessment & Plan  Continue PPI       VTE Pharmacologic Prophylaxis:   Pharmacologic: Warfarin (Coumadin)  Mechanical VTE Prophylaxis in Place: Yes    Discussions with Specialists or Other Care Team Provider: nursing    Education and Discussions with Family / Patient: patient    Current Length of Stay: 12 day(s)    Current Patient Status: Inpatient   Certification Statement: The patient will continue to require additional inpatient hospital stay due to iv antibiotics    Discharge Plan: active    Code Status: Level 3 - DNAR and DNI      Subjective:   Patient seen and examined at bedside  Reports presence of pain, but better controlled  Objective:     Vitals:   Temp (24hrs), Av 2 °F (36 8 °C), Min:97 4 °F (36 3 °C), Max:98 8 °F (37 1 °C)    Temp:  [97 4 °F (36 3 °C)-98 8 °F (37 1 °C)] 98 7 °F (37 1 °C)  HR:  [79-99] 99  Resp:  [14-18] 14  BP: ()/(63-75) 174/75  SpO2:  [94 %-96 %] 94 %  Body mass index is 27 29 kg/m²  Input and Output Summary (last 24 hours): Intake/Output Summary (Last 24 hours) at 2023 1249  Last data filed at 2023 4438  Gross per 24 hour   Intake 1743 5 ml   Output 550 ml   Net 1193 5 ml       Physical Exam:     Physical Exam  Vitals reviewed  HENT:      Head: Normocephalic  Nose: Nose normal       Mouth/Throat:      Mouth: Mucous membranes are moist    Eyes:      General: No scleral icterus  Cardiovascular:      Rate and Rhythm: Normal rate  Pulmonary:      Effort: Pulmonary effort is normal  No respiratory distress  Abdominal:      Palpations: Abdomen is soft  Tenderness: There is no abdominal tenderness  Musculoskeletal:      Comments: Bilateral aka   Skin:     General: Skin is warm  Neurological:      Mental Status: She is alert  Mental status is at baseline     Psychiatric:         Mood and Affect: Mood normal          Behavior: Behavior normal          Additional Data:     Labs:    Results from last 7 days   Lab Units 01/29/23  0501   WBC Thousand/uL 23 18*   HEMOGLOBIN g/dL 7 6*   HEMATOCRIT % 24 3*   PLATELETS Thousands/uL 436*   NEUTROS PCT % 77*   LYMPHS PCT % 8*   MONOS PCT % 5   EOS PCT % 8*     Results from last 7 days   Lab Units 01/29/23  0501 01/28/23  0445   SODIUM mmol/L 138 137   POTASSIUM mmol/L 3 0* 3 4*   CHLORIDE mmol/L 105 104   CO2 mmol/L 27 26   BUN mg/dL 8 13   CREATININE mg/dL 0 55* 0 54*   ANION GAP mmol/L 6 7   CALCIUM mg/dL 7 0* 7 2*   ALBUMIN g/dL  --  2 2*   TOTAL BILIRUBIN mg/dL  --  0 29   ALK PHOS U/L  --  180*   ALT U/L  --  10   AST U/L  --  15   GLUCOSE RANDOM mg/dL 95 108     Results from last 7 days   Lab Units 01/29/23  0939   INR  1 96*     Results from last 7 days   Lab Units 01/29/23  1103 01/29/23  0625 01/29/23  0046 01/28/23  1742 01/28/23  1207 01/28/23  0548 01/28/23  0004 01/27/23  1707 01/27/23  1225 01/27/23  0558 01/27/23  0007 01/26/23  2047   POC GLUCOSE mg/dl 178* 91 92 159* 110 107 106 109 91 106 124 131         Results from last 7 days   Lab Units 01/25/23  0431   PROCALCITONIN ng/ml 0 09           * I Have Reviewed All Lab Data Listed Above  * Additional Pertinent Lab Tests Reviewed:  Nilda 67 Admission Reviewed      Lines:   Invasive Devices     Peripherally Inserted Central Catheter Line  Duration           PICC Line 04/46/74 Right Basilic 2 days          Drain  Duration           External Urinary Catheter <1 day                   Imaging:    Imaging Reports Reviewed Today Include: No new imaging    Recent Cultures (last 7 days):     Results from last 7 days   Lab Units 01/27/23  1610 01/24/23  1703   GRAM STAIN RESULT  2+ Polys  No bacteria seen No Polys or Bacteria seen  1+ Polys  No bacteria seen   WOUND CULTURE   --  Growth in Broth culture only Candida albicans*  Few Colonies of       Last 24 Hours Medication List: Current Facility-Administered Medications   Medication Dose Route Frequency Provider Last Rate   • acetaminophen  650 mg Oral Q6H PRN Irma Mata MD     • amiodarone  400 mg Oral BID With Meals Magaly Reynolds MD     • amLODIPine  10 mg Oral Daily Magaly Reynolds MD     • ampicillin  2,000 mg Intravenous Q4H Magaly Reynolds MD Stopped (01/29/23 1128)   • bisacodyl  10 mg Rectal Daily PRN Magaly Reynolds MD     • buPROPion  75 mg Per NG Tube BID Magaly Reynolds MD     • clopidogrel  75 mg Oral Daily Magaly Reynolds MD     • hydrALAZINE  20 mg Intravenous Q4H PRN Magaly Reynolds MD     • hydrALAZINE  25 mg Oral [facility] Magaly Reynolds MD     • HYDROmorphone  0 2 mg Intravenous Q4H PRN Magaly Reynolds MD     • insulin glargine  20 Units Subcutaneous QAM Magaly Reynolds MD     • insulin lispro  2-12 Units Subcutaneous Q6H [address] Magaly Reynolds MD     • lisinopril  40 mg Oral Daily Magaly Reynolds MD     • meropenem  2,000 mg Intravenous Q8H Magaly Reynolds MD 2,000 mg (01/29/23 1058)   • metoprolol tartrate  25 mg Oral Q12H [address] Magaly Reynolds MD     • oxyCODONE  5 mg Oral Q4H PRN Irma Mata MD     • oxyCODONE  5 mg Oral Q6H Irma Mata MD     • pantoprazole  40 mg Oral Early Morning Irma Mata MD     • potassium chloride  20 mEq Oral Q2H Irma Mata MD     • senna  1 tablet Oral HS Magaly Reynolds MD     • sertraline  100 mg Oral Daily Magaly Reynolds MD     • thiamine  100 mg Oral Daily Magaly Reynolds MD     • warfarin  5 mg Oral Daily (warfarin) Irma Mata MD          Today, Patient Was Seen By: Rachel Tello MD    ** Please Note: Dictation voice to text software may have been used in the creation of this document   **

## 2023-01-30 LAB
ANION GAP SERPL CALCULATED.3IONS-SCNC: 7 MMOL/L (ref 4–13)
BACTERIA SPEC ANAEROBE CULT: NORMAL
BASOPHILS # BLD AUTO: 0.11 THOUSANDS/ÂΜL (ref 0–0.1)
BASOPHILS NFR BLD AUTO: 1 % (ref 0–1)
BUN SERPL-MCNC: 7 MG/DL (ref 5–25)
CALCIUM SERPL-MCNC: 7 MG/DL (ref 8.4–10.2)
CHLORIDE SERPL-SCNC: 106 MMOL/L (ref 96–108)
CO2 SERPL-SCNC: 25 MMOL/L (ref 21–32)
CREAT SERPL-MCNC: 0.48 MG/DL (ref 0.6–1.3)
EOSINOPHIL # BLD AUTO: 2.27 THOUSAND/ÂΜL (ref 0–0.61)
EOSINOPHIL NFR BLD AUTO: 12 % (ref 0–6)
ERYTHROCYTE [DISTWIDTH] IN BLOOD BY AUTOMATED COUNT: 17.1 % (ref 11.6–15.1)
GFR SERPL CREATININE-BSD FRML MDRD: 106 ML/MIN/1.73SQ M
GLUCOSE SERPL-MCNC: 103 MG/DL (ref 65–140)
GLUCOSE SERPL-MCNC: 115 MG/DL (ref 65–140)
GLUCOSE SERPL-MCNC: 122 MG/DL (ref 65–140)
GLUCOSE SERPL-MCNC: 142 MG/DL (ref 65–140)
GLUCOSE SERPL-MCNC: 85 MG/DL (ref 65–140)
HCT VFR BLD AUTO: 23.3 % (ref 34.8–46.1)
HGB BLD-MCNC: 7.1 G/DL (ref 11.5–15.4)
IMM GRANULOCYTES # BLD AUTO: 0.27 THOUSAND/UL (ref 0–0.2)
IMM GRANULOCYTES NFR BLD AUTO: 1 % (ref 0–2)
INR PPP: 2.21 (ref 0.84–1.19)
LYMPHOCYTES # BLD AUTO: 1.83 THOUSANDS/ÂΜL (ref 0.6–4.47)
LYMPHOCYTES NFR BLD AUTO: 9 % (ref 14–44)
MAGNESIUM SERPL-MCNC: 1.8 MG/DL (ref 1.9–2.7)
MCH RBC QN AUTO: 27.4 PG (ref 26.8–34.3)
MCHC RBC AUTO-ENTMCNC: 30.5 G/DL (ref 31.4–37.4)
MCV RBC AUTO: 90 FL (ref 82–98)
MONOCYTES # BLD AUTO: 1.04 THOUSAND/ÂΜL (ref 0.17–1.22)
MONOCYTES NFR BLD AUTO: 5 % (ref 4–12)
NEUTROPHILS # BLD AUTO: 14.2 THOUSANDS/ÂΜL (ref 1.85–7.62)
NEUTS SEG NFR BLD AUTO: 72 % (ref 43–75)
NRBC BLD AUTO-RTO: 0 /100 WBCS
PLATELET # BLD AUTO: 415 THOUSANDS/UL (ref 149–390)
PMV BLD AUTO: 8.6 FL (ref 8.9–12.7)
POTASSIUM SERPL-SCNC: 3.4 MMOL/L (ref 3.5–5.3)
PROTHROMBIN TIME: 24.4 SECONDS (ref 11.6–14.5)
RBC # BLD AUTO: 2.59 MILLION/UL (ref 3.81–5.12)
SODIUM SERPL-SCNC: 138 MMOL/L (ref 135–147)
WBC # BLD AUTO: 19.72 THOUSAND/UL (ref 4.31–10.16)

## 2023-01-30 RX ORDER — MAGNESIUM SULFATE HEPTAHYDRATE 40 MG/ML
2 INJECTION, SOLUTION INTRAVENOUS ONCE
Status: COMPLETED | OUTPATIENT
Start: 2023-01-30 | End: 2023-01-30

## 2023-01-30 RX ORDER — POTASSIUM CHLORIDE 20MEQ/15ML
20 LIQUID (ML) ORAL
Status: COMPLETED | OUTPATIENT
Start: 2023-01-30 | End: 2023-01-30

## 2023-01-30 RX ORDER — WARFARIN SODIUM 3 MG/1
3 TABLET ORAL
Status: DISCONTINUED | OUTPATIENT
Start: 2023-01-30 | End: 2023-02-03

## 2023-01-30 RX ADMIN — THIAMINE HCL TAB 100 MG 100 MG: 100 TAB at 08:58

## 2023-01-30 RX ADMIN — AMIODARONE HYDROCHLORIDE 400 MG: 200 TABLET ORAL at 16:53

## 2023-01-30 RX ADMIN — AMPICILLIN SODIUM 2000 MG: 2 INJECTION, POWDER, FOR SOLUTION INTRAMUSCULAR; INTRAVENOUS at 23:52

## 2023-01-30 RX ADMIN — MICAFUNGIN SODIUM 150 MG: 50 INJECTION, POWDER, LYOPHILIZED, FOR SOLUTION INTRAVENOUS at 21:34

## 2023-01-30 RX ADMIN — OXYCODONE 5 MG: 5 TABLET ORAL at 16:52

## 2023-01-30 RX ADMIN — LISINOPRIL 40 MG: 20 TABLET ORAL at 08:59

## 2023-01-30 RX ADMIN — HYDRALAZINE HYDROCHLORIDE 25 MG: 25 TABLET, FILM COATED ORAL at 14:20

## 2023-01-30 RX ADMIN — MAGNESIUM SULFATE HEPTAHYDRATE 2 G: 40 INJECTION, SOLUTION INTRAVENOUS at 15:20

## 2023-01-30 RX ADMIN — MEROPENEM 2000 MG: 1 INJECTION, POWDER, FOR SOLUTION INTRAVENOUS at 18:30

## 2023-01-30 RX ADMIN — AMPICILLIN SODIUM 2000 MG: 2 INJECTION, POWDER, FOR SOLUTION INTRAMUSCULAR; INTRAVENOUS at 14:22

## 2023-01-30 RX ADMIN — AMPICILLIN SODIUM 2000 MG: 2 INJECTION, POWDER, FOR SOLUTION INTRAMUSCULAR; INTRAVENOUS at 06:48

## 2023-01-30 RX ADMIN — INSULIN GLARGINE 20 UNITS: 100 INJECTION, SOLUTION SUBCUTANEOUS at 08:59

## 2023-01-30 RX ADMIN — POTASSIUM CHLORIDE 20 MEQ: 1.5 SOLUTION ORAL at 16:51

## 2023-01-30 RX ADMIN — POTASSIUM CHLORIDE 20 MEQ: 1.5 SOLUTION ORAL at 14:21

## 2023-01-30 RX ADMIN — MEROPENEM 2000 MG: 1 INJECTION, POWDER, FOR SOLUTION INTRAVENOUS at 10:33

## 2023-01-30 RX ADMIN — OXYCODONE 5 MG: 5 TABLET ORAL at 04:57

## 2023-01-30 RX ADMIN — OXYCODONE 5 MG: 5 TABLET ORAL at 10:32

## 2023-01-30 RX ADMIN — WARFARIN SODIUM 3 MG: 3 TABLET ORAL at 18:29

## 2023-01-30 RX ADMIN — AMPICILLIN SODIUM 2000 MG: 2 INJECTION, POWDER, FOR SOLUTION INTRAMUSCULAR; INTRAVENOUS at 18:35

## 2023-01-30 RX ADMIN — METOPROLOL TARTRATE 25 MG: 25 TABLET, FILM COATED ORAL at 21:34

## 2023-01-30 RX ADMIN — HYDRALAZINE HYDROCHLORIDE 25 MG: 25 TABLET, FILM COATED ORAL at 21:34

## 2023-01-30 RX ADMIN — AMPICILLIN SODIUM 2000 MG: 2 INJECTION, POWDER, FOR SOLUTION INTRAMUSCULAR; INTRAVENOUS at 11:02

## 2023-01-30 RX ADMIN — BUPROPION HYDROCHLORIDE 75 MG: 75 TABLET, FILM COATED ORAL at 18:29

## 2023-01-30 RX ADMIN — OXYCODONE 5 MG: 5 TABLET ORAL at 21:26

## 2023-01-30 RX ADMIN — AMPICILLIN SODIUM 2000 MG: 2 INJECTION, POWDER, FOR SOLUTION INTRAMUSCULAR; INTRAVENOUS at 03:02

## 2023-01-30 RX ADMIN — AMLODIPINE BESYLATE 10 MG: 10 TABLET ORAL at 08:59

## 2023-01-30 RX ADMIN — OXYCODONE 5 MG: 5 TABLET ORAL at 00:59

## 2023-01-30 RX ADMIN — HYDRALAZINE HYDROCHLORIDE 25 MG: 25 TABLET, FILM COATED ORAL at 04:57

## 2023-01-30 RX ADMIN — BUPROPION HYDROCHLORIDE 75 MG: 75 TABLET, FILM COATED ORAL at 08:59

## 2023-01-30 RX ADMIN — OXYCODONE 5 MG: 5 TABLET ORAL at 23:52

## 2023-01-30 RX ADMIN — SERTRALINE HYDROCHLORIDE 100 MG: 100 TABLET, FILM COATED ORAL at 08:58

## 2023-01-30 RX ADMIN — MEROPENEM 2000 MG: 1 INJECTION, POWDER, FOR SOLUTION INTRAVENOUS at 03:02

## 2023-01-30 RX ADMIN — AMIODARONE HYDROCHLORIDE 400 MG: 200 TABLET ORAL at 08:58

## 2023-01-30 RX ADMIN — METOPROLOL TARTRATE 25 MG: 25 TABLET, FILM COATED ORAL at 08:59

## 2023-01-30 RX ADMIN — PANTOPRAZOLE SODIUM 40 MG: 40 TABLET, DELAYED RELEASE ORAL at 04:57

## 2023-01-30 RX ADMIN — SENNOSIDES 8.6 MG: 8.6 TABLET ORAL at 21:29

## 2023-01-30 RX ADMIN — CLOPIDOGREL BISULFATE 75 MG: 75 TABLET ORAL at 08:59

## 2023-01-30 NOTE — ASSESSMENT & PLAN NOTE
S/p L AKA 11/23/22   S/p R AKA, wound debridement 12/1/22   S/p R AKA wound/stump washout and VAC placement 12/16/22   S/p R AKA wound/stump VAC change, L AKA stump washout/packing 12/19/22   Wound Cx 12/19/22: enterobacter cloacae     During this admission  · S/p Right - AMPUTATION REVISION STUMP 1/27/2023

## 2023-01-30 NOTE — ASSESSMENT & PLAN NOTE
Cardiac arrest presumably secondary to electrolyte abnormalities and/or severe sepsis  · Cardiology recommendations appreciated  · Continue with amiodarone, Norvasc, hydralazine, lisinopril, metoprolol  · Of note, she is on twice daily amiodarone  Will defer to cardiology when it would be appropriate to adjust her dose

## 2023-01-30 NOTE — ASSESSMENT & PLAN NOTE
Polymicrobial bacteremia (Enterobacter / Enterococcus)  · Antibiotic management per infectious disease  Currently on meropenem / Ampicillin

## 2023-01-30 NOTE — ASSESSMENT & PLAN NOTE
Replete PRN    Recent Labs     01/28/23  0445 01/29/23  0501 01/30/23  0509   K 3 4* 3 0* 3 4*   MG 1 8* 2 0 1 8*

## 2023-01-30 NOTE — ASSESSMENT & PLAN NOTE
Lab Results   Component Value Date    HGBA1C 9 8 (H) 10/25/2022       Recent Labs     01/30/23  0053 01/30/23  0456 01/30/23  0744 01/30/23  1129   POCGLU 85 115 103 103       Blood Sugar Average: Last 72 hrs:  (P) 112 [id number]     Home regimen: Lantus 60U HS, Novolog 2oU TID prior to meals  Inpatient regimen: Lantus 20UHS, sliding scale

## 2023-01-30 NOTE — ASSESSMENT & PLAN NOTE
History of HIT   On warfarin  Goal INR 2-3  Decrease dose back to 3mg from 5mg      Recent Labs     01/29/23  0939 01/30/23  0509   INR 1 96* 2 21*

## 2023-01-30 NOTE — PROGRESS NOTES
[facility]  Progress Note - [address] 1960, 65 y o  female MRN: [id number]  Unit/Bed#: [place] [address] -01 Encounter: [id number]  Primary Care Provider: ALEX Stokes   Date and time admitted to hospital: 1/17/2023  3:48 PM    Bacteremia  Assessment & Plan  65year old female former smoker w/complicated medical hx including HTN, HLD, uncontrolled type II DM (A1c 9 8), JUSTIN, R hemispheric CVA, bilateral tissue loss s/p multiple vascular interventions, subsequently requiring b/l AKA  Patient now presents with sepsis, bacteremia and UTI with altered mental status/encephalopathy  Patient ultimately suffered Vfib arrest and remains intubated with plan for extubation  Vascular surgery consulted to r/o stump infection as source of bacteremia/sepsis  S/p L AKA 11/23/22 [facility])  S/p R AKA, wound debridement 12/1/22 [facility])  S/p R AKA wound/stump washout and VAC placement 12/16/22 [facility])  S/p R AKA wound/stump VAC change, L AKA stump washout/packing 12/19/22 [facility])  S/p R AKA revision 1/27/23 ([place])    Blood Cx: +Enterobacter cloacae, +Enterococcus faecalis   Urine Cx: +Enterococcus faecalis, +Citrobacter freundii, +Klebsiella variicola     Recommendations:  - S/p bilateral AKA w/subsequent R AKA revision   - Patient remains stable  More alert this AM  Able to answer questions and partake in medical decisions  - Underwent R AKA revision on 1/27 as well as LEON, no christina purulent material encountered intraoperatively  Material sent for culture  LEON with no evidence of infective endocarditis  - Dressing taken down over the weekend  Incision is c/d/i    - Daily dressing changes per nursing    - ID on board, appreciate recommendations   - Cardiology following as patient is now s/p arrest w/Vfib  - Diet per primary  - Patient on coumadin and Plavix at baseline  - Will continue to follow peripherally for stump checks  Subjective: Patient seen and examined  Offers no complaints  Remains stable     Vitals:  BP (!) 173/75 (BP Location: Left arm)   Pulse 90   Temp 98 2 °F (36 8 °C) (Oral)   Resp 20   Ht 5' 4" (1 626 m)   Wt 72 1 kg (159 lb)   SpO2 97%   BMI 27 29 kg/m²     I/Os:  I/O last 3 completed shifts: In: 1593 5 [IV Piggyback:1593 5]  Out: 550 [Urine:550]  No intake/output data recorded  Lab Results and Cultures:   CBC with diff: Lab Results   Component Value Date    WBC 19 72 (H) 01/30/2023    HGB 7 1 (L) 01/30/2023    HCT 23 3 (L) 01/30/2023    MCV 90 01/30/2023     (H) 01/30/2023    MCH 27 4 01/30/2023    MCHC 30 5 (L) 01/30/2023    RDW 17 1 (H) 01/30/2023    MPV 8 6 (L) 01/30/2023    NRBC 0 01/30/2023   ,   BMP/CMP:  Lab Results   Component Value Date    SODIUM 138 01/30/2023    K 3 4 (L) 01/30/2023     01/30/2023    CO2 25 01/30/2023    BUN 7 01/30/2023    CREATININE 0 48 (L) 01/30/2023    CALCIUM 7 0 (L) 01/30/2023    AST 15 01/28/2023    ALT 10 01/28/2023    ALKPHOS 180 (H) 01/28/2023    EGFR 106 01/30/2023   ,   Lipid Panel: No results found for: CHOL,   Coags:   Lab Results   Component Value Date    PTT 57 (H) 01/17/2023    INR 2 21 (H) 01/30/2023   ,     Blood Culture:   Lab Results   Component Value Date    BLOODCX No Growth After 5 Days  01/21/2023    BLOODCX No Growth After 5 Days   01/21/2023   ,   Urinalysis: Lab Results   Component Value Date    COLORU Yellow 01/17/2023    CLARITYU Cloudy 01/17/2023    SPECGRAV 1 025 01/17/2023    PHUR 6 0 01/17/2023    PHUR 6 5 02/23/2018    LEUKOCYTESUR Small (A) 01/17/2023    NITRITE Negative 01/17/2023    GLUCOSEU Negative 01/17/2023    KETONESU Negative 01/17/2023    BILIRUBINUR Negative 01/17/2023    BLOODU Large (A) 01/17/2023   ,   Urine Culture:   Lab Results   Component Value Date    URINECX >100,000 cfu/ml Enterococcus faecalis (A) 01/17/2023    URINECX <10,000 cfu/ml Citrobacter freundii (A) 01/17/2023    URINECX <10,000 cfu/ml Klebsiella variicola (A) 01/17/2023   ,   Wound Culure:   Lab Results   Component Value Date    WOUNDCULT Growth in Broth culture only Candida albicans (A) 01/24/2023    WOUNDCULT Few Colonies of 01/24/2023       Medications:  Current Facility-Administered Medications   Medication Dose Route Frequency   • acetaminophen (TYLENOL) tablet 650 mg  650 mg Oral Q6H PRN   • amiodarone tablet 400 mg  400 mg Oral BID With Meals   • amLODIPine (NORVASC) tablet 10 mg  10 mg Oral Daily   • ampicillin (OMNIPEN) 2,000 mg in sodium chloride 0 9 % 100 mL IVPB  2,000 mg Intravenous Q4H   • bisacodyl (DULCOLAX) rectal suppository 10 mg  10 mg Rectal Daily PRN   • buPROPion (WELLBUTRIN) tablet 75 mg  75 mg Per NG Tube BID   • clopidogrel (PLAVIX) tablet 75 mg  75 mg Oral Daily   • hydrALAZINE (APRESOLINE) injection 20 mg  20 mg Intravenous Q4H PRN   • hydrALAZINE (APRESOLINE) tablet 25 mg  25 mg Oral Q8H [facility]   • HYDROmorphone (DILAUDID) injection 0 2 mg  0 2 mg Intravenous Q4H PRN   • insulin glargine (LANTUS) subcutaneous injection 20 Units 0 2 mL  20 Units Subcutaneous QAM   • insulin lispro (HumaLOG) 100 units/mL subcutaneous injection 2-12 Units  2-12 Units Subcutaneous Q6H [facility]   • lisinopril (ZESTRIL) tablet 40 mg  40 mg Oral Daily   • meropenem (MERREM) 2,000 mg in sodium chloride 0 9 % 100 mL IVPB  2,000 mg Intravenous Q8H   • metoprolol tartrate (LOPRESSOR) tablet 25 mg  25 mg Oral Q12H MICHELLE   • oxyCODONE (ROXICODONE) IR tablet 5 mg  5 mg Oral Q4H PRN   • oxyCODONE (ROXICODONE) IR tablet 5 mg  5 mg Oral Q6H   • pantoprazole (PROTONIX) EC tablet 40 mg  40 mg Oral Early Morning   • senna (SENOKOT) tablet 8 6 mg  1 tablet Oral HS   • sertraline (ZOLOFT) tablet 100 mg  100 mg Oral Daily   • thiamine tablet 100 mg  100 mg Oral Daily   • warfarin (COUMADIN) tablet 3 mg  3 mg Oral Daily (warfarin)       Imaging:  Reviewed    Physical Exam:    General: Alert and oriented   CV: Regular rate   Respiratory: Normal effort  Abdominal: Soft, non-distended   Extremities: S/p bilateral AKA Neurologic: Grossly normal        Miller Jones PA-C  1/30/2023

## 2023-01-30 NOTE — PROGRESS NOTES
Progress Note - Infectious Disease   Lauryn Fernandez 65 y o  female MRN: [id number]  Unit/Bed#: [place] [address] -01 Encounter: [id number]      Impression/Plan:  1   Sepsis, POA   Patient met sepsis criteria on presentation   Sources are 2 and 3  Course complicated by 4   Extensive resistance developing on prior cultures   2D echo technically difficult study   Extensive CT imaging without any focal findings initially   Clinical parameters overall improved except for leukocytosis   Ongoing white count likely related to the right stump, now downtrending post interventions  Antibiotics as below  Repeat EKGs ordered  Antifungal added  Continue to trend fever curve/vitals  Repeat CBC and CMP tomorrow  Follow-up pending or cultures  Maintain current PICC line  Additional supportive care as per primary  Duration of therapy/final agents pending further culture data     2   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   Blood cultures have isolated Enterobacter and Enterococcus faecalis  Enterobacter resistant to Ertapenem but no carbapenemase present on confirmatory testing   My suspicion overall remains for infection involving her right AKA stump given prior wound cultures and now findings on CT imaging with contrast   Urinary source considered given isolation of Enterococcus   Consider also endovascular source given 4   2D echo limited  No other obvious ectopic focus of infection on extensive imaging   Repeat cultures cleared rapidly   Wound cultures from 1/24 with a few colonies of staph likely colonizing the open wound and yeast only in broth culture likely not significant  Transesophageal echo completed on 1/27 which was without vegetation  Underwent debridement of the right stump with vascular on 1/27  OR cultures now showing yeast, Enterococcus and Enterobacter  We will need to follow-up susceptibilities  Likely combination of oral and IV therapy    EKG unfortunately with prolonged QTC as well  Continue meropenem for now  Continue ampicillin at least through 2/1 for bacteremia  Possible transition to doxycycline for Enterobacter if OR culture susceptible  Possible transition to Augmentin for Enterococcus of OR culture susceptibilities  Micafungin added given yeast in OR cultures  We will check EKG this evening and tomorrow morning  Could consider transition to fluconazole if EKG allows  Continue to trend fever curve/vitals  Repeat CBC and CMP tomorrow  Follow up pending cultures  Plan for 6 weeks of antibiotic coverage for bone and joint infection  Plan for total 6 months of antifungal coverage for bone and joint infection  Maintain PICC line for now  We will arrange ID follow-up closer to discharge  Additional interventions pending clinical course     3   Complicated urinary tract infection   Patient unable to provide any history  Judith Starks was grossly abnormal on presentation   Possibly contributed to presentation   Higher suspicion for wound infection as above   Course of therapy completed for this issue  Continue antibiotic as above  Monitor abdominal exam  Catheters as per primary     4   Cardiac arrest   Suspect that this may have been unrelated to the above   Patient with multiple other comorbidities as well   Ongoing care and work-up as per critical care/cardiology  Patient extubated 1/20/2023     5   Transaminitis   Acute elevation noted likely in the setting of 4   We will need to monitor LFTs however on meropenem   Stable  Antibiotic as above  Repeat LFTs tomorrow  Monitor abdominal exam     Above plan initially discussed with patient and with case management earlier today    Above plan discussed in detail with primary service attending later today with subsequent cultures and with clinical ID pharmacist     ID consult service will continue to follow      Antibiotics:  Meropenem 13  Ampicillin 8  Total antibiotic 13    24 Hour events:  Yesterday and overnight notes reviewed and no acute events noted    Subjective:  Patient currently denies having any nausea, vomiting, chest pain or shortness of breath  She is a limited historian  Reviewed with nursing and no significant purulent drainage noted on packing change today  Objective:  Vitals:  Temp:  [98 °F (36 7 °C)-99 6 °F (37 6 °C)] 98 °F (36 7 °C)  HR:  [83-99] 88  Resp:  [14-20] 20  BP: (165-180)/(67-79) 167/78  SpO2:  [94 %-98 %] 96 %  Temp (24hrs), Av 7 °F (37 1 °C), Min:98 °F (36 7 °C), Max:99 6 °F (37 6 °C)  Current: Temperature: 98 °F (36 7 °C)    Physical Exam:   General Appearance:  Alert, interactive, nontoxic, no acute distress  Throat: Oropharynx moist without lesions  Lungs:   Clear to auscultation bilaterally; no wheezes, rhonchi or rales; respirations unlabored   Heart:  RRR; no murmur, rub or gallop noted   Abdomen:   Soft, non-tender, non-distended, positive bowel sounds  Extremities: No clubbing, cyanosis or edema   Skin: No new rashes or lesions  No new draining wounds noted  Labs, Imaging, & Other studies:   All pertinent labs and imaging studies were personally reviewed as below  Results from last 7 days   Lab Units 23  0509 23  0501 23  0445   WBC Thousand/uL 19 72* 23 18* 27 65*   HEMOGLOBIN g/dL 7 1* 7 6* 8 3*   PLATELETS Thousands/uL 415* 436* 480*     Results from last 7 days   Lab Units 23  0509 23  0501 23  0445 23  1035 23  0622   POTASSIUM mmol/L 3 4*   < > 3 4* 3 8 4 0   CHLORIDE mmol/L 106   < > 104 103 106   CO2 mmol/L 25   < > 26 26 26   BUN mg/dL 7   < > 13 12 14   CREATININE mg/dL 0 48*   < > 0 54* 0 47* 0 55*   EGFR ml/min/1 73sq m 106   < > 102 106 101   CALCIUM mg/dL 7 0*   < > 7 2* 7 5* 7 4*   AST U/L  --   --  15 17 18   ALT U/L  --   --  10 12 13   ALK PHOS U/L  --   --  180* 192* 176*    < > = values in this interval not displayed       Results from last 7 days   Lab Units 23  1610 23  1703   GRAM STAIN RESULT  2+ Polys No bacteria seen No Polys or Bacteria seen  1+ Polys  No bacteria seen   WOUND CULTURE   --  Growth in Broth culture only Candida albicans*  Few Colonies of       Lab interpretation/comments: White count remains elevated and is currently downtrending  Imaging interpretation/comments: No new images overnight    Culture data: Reviewed deep OR cultures and they are isolating Enterococcus, broth culture with Enterobacter and looks like yeast   EKGs however with significantly prolonged QTC

## 2023-01-30 NOTE — NURSING NOTE
Patient seen resting in the bed in no distress  Able to make needs  No c/o pain  All medications were administered as ordered

## 2023-01-30 NOTE — ASSESSMENT & PLAN NOTE
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

## 2023-01-30 NOTE — PROGRESS NOTES
[facility]  Progress Note - [address] [facility] [address] 1960, 65 y o  female MRN: [id number]  Unit/Bed#: [place] 2 -01 Encounter: [id number]  Primary Care Provider: ALEX Carlton   Date and time admitted to hospital: 1/17/2023  3:48 PM    * Sepsis due to urinary tract infection [facility])  Assessment & Plan  She presented with altered mental status, generalized weakness, and fall secondary to sepsis  Etiology secondary to complicated UTI , polymicrobial bacteremia, and 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 further worsened by her cardiac arrest  She is S/p right amputation revision stump  · Antibiotic management per infectious disease  Currently on Meropenem and ampicillin  Bacteremia  Assessment & Plan  Polymicrobial bacteremia (Enterobacter / Enterococcus)  · Antibiotic management per infectious disease  Currently on meropenem / Ampicillin  [facility]  Assessment & Plan  S/p L AKA 11/23/22   S/p R AKA, wound debridement 12/1/22   S/p R AKA wound/stump washout and VAC placement 12/16/22   S/p R AKA wound/stump VAC change, L AKA stump washout/packing 12/19/22   Wound Cx 12/19/22: enterobacter cloacae     During this admission  · S/p Right - AMPUTATION REVISION STUMP 1/27/2023            Osteomyelitis [facility])  Assessment & Plan  S/p right - amputation revision stump    Hypomagnesemia  Assessment & Plan  Replete prn    Cardiac arrest [facility])  Assessment & Plan  Cardiac arrest presumably secondary to electrolyte abnormalities and/or severe sepsis  · Cardiology recommendations appreciated  · Continue with amiodarone, Norvasc, hydralazine, lisinopril, metoprolol  · Of note, she is on twice daily amiodarone  Will defer to cardiology when it would be appropriate to adjust her dose  History of CVA (cerebrovascular accident)  Assessment & Plan  Patient had stroke on 11/14 right hemispheric, with residual left-sided weakness      HIT (heparin-induced thrombocytopenia)  Assessment & Plan  History of HIT   On warfarin  Goal INR 2-3  Decrease dose back to 3mg from 5mg  Recent Labs     23  0939 23  0509   INR 1 96* 2 21*         Hypokalemia  Assessment & Plan  Replete PRN    Recent Labs     23  0445 23  0501 23  0509   K 3 4* 3 0* 3 4*   MG 1 8* 2 0 1 8*         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 [facility])  Assessment & Plan  Lab Results   Component Value Date    HGBA1C 9 8 (H) 10/25/2022       Recent Labs     23  0053 23  0456 23  0744 23  1129   POCGLU 85 115 103 103       Blood Sugar Average: Last 72 hrs:  (P) 112 [id number]     Home regimen: Lantus 60U HS, Novolog 2oU TID prior to meals  Inpatient regimen: Lantus 20UHS, sliding scale  Esophageal reflux  Assessment & Plan  Continue PPI       VTE Pharmacologic Prophylaxis:   Pharmacologic: Warfarin (Coumadin)  Mechanical VTE Prophylaxis in Place: Yes    Discussions with Specialists or Other Care Team Provider: nursing    Education and Discussions with Family / Patient: [email or web address]    Current Length of Stay: 13 day(s)    Current Patient Status: Inpatient   Certification Statement: The patient will continue to require additional inpatient hospital stay due to iv antibiotics, id reeval    Discharge Plan: active    Code Status: Level 3 - DNAR and DNI      Subjective:   Patient seen and examined at bedside  Reports that her pain is well controlled today  Objective:     Vitals:   Temp (24hrs), Av 8 °F (37 1 °C), Min:98 °F (36 7 °C), Max:99 6 °F (37 6 °C)    Temp:  [98 °F (36 7 °C)-99 6 °F (37 6 °C)] 98 °F (36 7 °C)  HR:  [83-98] 88  Resp:  [14-20] 20  BP: (165-180)/(67-79) 167/78  SpO2:  [94 %-98 %] 96 %  Body mass index is 27 29 kg/m²       Input and Output Summary (last 24 hours):     No intake or output data in the 24 hours ending 23 1310    Physical Exam: Physical Exam  Vitals reviewed  Constitutional:       General: She is not in acute distress  HENT:      Head: Normocephalic  Nose: Nose normal       Mouth/Throat:      Mouth: Mucous membranes are moist    Eyes:      General: No scleral icterus  Cardiovascular:      Rate and Rhythm: Normal rate  Pulmonary:      Effort: Pulmonary effort is normal  No respiratory distress  Abdominal:      General: There is no distension  Palpations: Abdomen is soft  Tenderness: There is no abdominal tenderness  Musculoskeletal:      Comments: Bilateral AKA   Skin:     General: Skin is warm  Neurological:      Mental Status: She is alert  Mental status is at baseline  Psychiatric:         Mood and Affect: Mood normal          Behavior: Behavior normal        Additional Data:     Labs:    Results from last 7 days   Lab Units 01/30/23  0509   WBC Thousand/uL 19 72*   HEMOGLOBIN g/dL 7 1*   HEMATOCRIT % 23 3*   PLATELETS Thousands/uL 415*   NEUTROS PCT % 72   LYMPHS PCT % 9*   MONOS PCT % 5   EOS PCT % 12*     Results from last 7 days   Lab Units 01/30/23  0509 01/29/23  0501 01/28/23  0445   SODIUM mmol/L 138   < > 137   POTASSIUM mmol/L 3 4*   < > 3 4*   CHLORIDE mmol/L 106   < > 104   CO2 mmol/L 25   < > 26   BUN mg/dL 7   < > 13   CREATININE mg/dL 0 48*   < > 0 54*   ANION GAP mmol/L 7   < > 7   CALCIUM mg/dL 7 0*   < > 7 2*   ALBUMIN g/dL  --   --  2 2*   TOTAL BILIRUBIN mg/dL  --   --  0 29   ALK PHOS U/L  --   --  180*   ALT U/L  --   --  10   AST U/L  --   --  15   GLUCOSE RANDOM mg/dL 103   < > 108    < > = values in this interval not displayed       Results from last 7 days   Lab Units 01/30/23  0509   INR  2 21*     Results from last 7 days   Lab Units 01/30/23  1129 01/30/23  0744 01/30/23  0456 01/30/23  0053 01/29/23  2151 01/29/23  1644 01/29/23  1103 01/29/23  0625 01/29/23  0046 01/28/23  1742 01/28/23  1207 01/28/23  0548   POC GLUCOSE mg/dl 103 103 115 85 125 105 178* 91 92 159* 110 107 Results from last 7 days   Lab Units 01/25/23  0431   PROCALCITONIN ng/ml 0 09           * I Have Reviewed All Lab Data Listed Above  * Additional Pertinent Lab Tests Reviewed:  [place] [address] Admission Reviewed      Lines:   Invasive Devices     Peripherally Inserted Central Catheter Line  Duration           PICC Line 78/23/14 Right Basilic 3 days          Drain  Duration           External Urinary Catheter 1 day                   Imaging:    Imaging Reports Reviewed Today Include: no new imaging    Recent Cultures (last 7 days):     Results from last 7 days   Lab Units 01/27/23  1610 01/24/23  1703   GRAM STAIN RESULT  2+ Polys  No bacteria seen No Polys or Bacteria seen  1+ Polys  No bacteria seen   WOUND CULTURE   --  Growth in Broth culture only Candida albicans*  Few Colonies of       Last 24 Hours Medication List:   Current Facility-Administered Medications   Medication Dose Route Frequency Provider Last Rate   • acetaminophen  650 mg Oral Q6H PRN Katiuska Burton MD     • amiodarone  400 mg Oral BID With Meals Nicolasa Rick MD     • amLODIPine  10 mg Oral Daily Nicolasa Rick MD     • ampicillin  2,000 mg Intravenous Q4H Nicolasa Rick MD 2,000 mg (01/30/23 1102)   • bisacodyl  10 mg Rectal Daily PRN Nicolasa Rick MD     • buPROPion  75 mg Per NG Tube BID Nicolasa Rick MD     • clopidogrel  75 mg Oral Daily Nicolasa Rick MD     • hydrALAZINE  20 mg Intravenous Q4H PRN Nicolasa Rick MD     • hydrALAZINE  25 mg Oral [facility] Nicolasa Rick MD     • HYDROmorphone  0 2 mg Intravenous Q4H PRN Nicolasa Rick MD     • insulin glargine  20 Units Subcutaneous QA Nicolasa Rick MD     • insulin lispro  2-12 Units Subcutaneous Q6H [facility] Nicolasa Rick MD     • lisinopril  40 mg Oral Daily Nicolasa Rick MD     • magnesium sulfate  2 g Intravenous Once Katiuska Burton MD     • meropenem  2,000 mg Intravenous Q8H Nicolasa Rick MD 2,000 mg (01/30/23 1033)   • metoprolol tartrate  25 mg Oral Q12H [address] Collin Lima MD     • oxyCODONE  5 mg Oral Q4H PRN Damaris Evans MD     • oxyCODONE  5 mg Oral Q6H Damaris Evans MD     • pantoprazole  40 mg Oral Early Morning Damaris Evans MD     • potassium chloride  20 mEq Oral Q2H Damaris Evans MD     • senna  1 tablet Oral HS Collin Lima MD     • sertraline  100 mg Oral Daily Collin Lima MD     • thiamine  100 mg Oral Daily Collin Lima MD     • warfarin  3 mg Oral Daily (warfarin) Damaris Evans MD          Today, Patient Was Seen By: Dayron Grover MD    ** Please Note: Dictation voice to text software may have been used in the creation of this document   **

## 2023-01-30 NOTE — PLAN OF CARE
Problem: MOBILITY - ADULT  Goal: Maintain or return to baseline ADL function  Description: INTERVENTIONS:  -  Assess patient's ability to carry out ADLs; assess patient's baseline for ADL function and identify physical deficits which impact ability to perform ADLs (bathing, care of mouth/teeth, toileting, grooming, dressing, etc )  - Assess/evaluate cause of self-care deficits   - Assess range of motion  - Assess patient's mobility; develop plan if impaired  - Assess patient's need for assistive devices and provide as appropriate  - Encourage maximum independence but intervene and supervise when necessary  - Involve family in performance of ADLs  - Assess for home care needs following discharge   - Consider OT consult to assist with ADL evaluation and planning for discharge  - Provide patient education as appropriate  Outcome: Progressing  Goal: Maintains/Returns to pre admission functional level  Description: INTERVENTIONS:  - Perform BMAT or MOVE assessment daily    - Set and communicate daily mobility goal to care team and patient/family/caregiver  - Collaborate with rehabilitation services on mobility goals if consulted  - Perform Range of Motion 4 times a day  - Reposition patient every 2 hours    - Dangle patient 4 times a day  - Stand patient 4 times a day  - Ambulate patient 4 times a day  - Out of bed to chair 4 times a day   - Out of bed for meals 4 times a day  - Out of bed for toileting  - Record patient progress and toleration of activity level   Outcome: Progressing     Problem: Prexisting or High Potential for Compromised Skin Integrity  Goal: Skin integrity is maintained or improved  Description: INTERVENTIONS:  - Identify patients at risk for skin breakdown  - Assess and monitor skin integrity  - Assess and monitor nutrition and hydration status  - Monitor labs   - Assess for incontinence   - Turn and reposition patient  - Assist with mobility/ambulation  - Relieve pressure over bony prominences  - Avoid friction and shearing  - Provide appropriate hygiene as needed including keeping skin clean and dry  - Evaluate need for skin moisturizer/barrier cream  - Collaborate with interdisciplinary team   - Patient/family teaching  - Consider wound care consult   Outcome: Progressing     Problem: PAIN - ADULT  Goal: Verbalizes/displays adequate comfort level or baseline comfort level  Description: Interventions:  - Encourage patient to monitor pain and request assistance  - Assess pain using appropriate pain scale  - Administer analgesics based on type and severity of pain and evaluate response  - Implement non-pharmacological measures as appropriate and evaluate response  - Consider cultural and social influences on pain and pain management  - Notify physician/advanced practitioner if interventions unsuccessful or patient reports new pain  Outcome: Progressing     Problem: INFECTION - ADULT  Goal: Absence or prevention of progression during hospitalization  Description: INTERVENTIONS:  - Assess and monitor for signs and symptoms of infection  - Monitor lab/diagnostic results  - Monitor all insertion sites, i e  indwelling lines, tubes, and drains  - Monitor endotracheal if appropriate and nasal secretions for changes in amount and color  - [place] appropriate cooling/warming therapies per order  - Administer medications as ordered  - Instruct and encourage patient and family to use good hand hygiene technique  - Identify and instruct in appropriate isolation precautions for identified infection/condition  Outcome: Progressing     Problem: SAFETY ADULT  Goal: Maintain or return to baseline ADL function  Description: INTERVENTIONS:  -  Assess patient's ability to carry out ADLs; assess patient's baseline for ADL function and identify physical deficits which impact ability to perform ADLs (bathing, care of mouth/teeth, toileting, grooming, dressing, etc )  - Assess/evaluate cause of self-care deficits - Assess range of motion  - Assess patient's mobility; develop plan if impaired  - Assess patient's need for assistive devices and provide as appropriate  - Encourage maximum independence but intervene and supervise when necessary  - Involve family in performance of ADLs  - Assess for home care needs following discharge   - Consider OT consult to assist with ADL evaluation and planning for discharge  - Provide patient education as appropriate  Outcome: Progressing  Goal: Maintains/Returns to pre admission functional level  Description: INTERVENTIONS:  - Perform BMAT or MOVE assessment daily    - Set and communicate daily mobility goal to care team and patient/family/caregiver  - Collaborate with rehabilitation services on mobility goals if consulted  - Perform Range of Motion 4 times a day  - Reposition patient every 2 hours    - Dangle patient 4 times a day  - Stand patient 4 times a day  - Ambulate patient 4 times a day  - Out of bed to chair 4 times a day   - Out of bed for meals 4 times a day  - Out of bed for toileting  - Record patient progress and toleration of activity level   Outcome: Progressing  Goal: Patient will remain free of falls  Description: INTERVENTIONS:  - Educate patient/family on patient safety including physical limitations  - Instruct patient to call for assistance with activity   - Consult OT/PT to assist with strengthening/mobility   - Keep Call bell within reach  - Keep bed low and locked with side rails adjusted as appropriate  - Keep care items and personal belongings within reach  - Initiate and maintain comfort rounds  - Make Fall Risk Sign visible to staff  - Offer Toileting every  Hours, in advance of need  - Initiate/Maintain alarm  - Obtain necessary fall risk management equipment  - Apply yellow socks and bracelet for high fall risk patients  - Consider moving patient to room near nurses station  Outcome: Progressing     Problem: DISCHARGE PLANNING  Goal: Discharge to home or other facility with appropriate resources  Description: INTERVENTIONS:  - Identify barriers to discharge w/patient and caregiver  - Arrange for needed discharge resources and transportation as appropriate  - Identify discharge learning needs (meds, wound care, etc )  - Arrange for interpretive services to assist at discharge as needed  - Refer to Case Management Department for coordinating discharge planning if the patient needs post-hospital services based on physician/advanced practitioner order or complex needs related to functional status, cognitive ability, or social support system  Outcome: Progressing     Problem: Knowledge Deficit  Goal: Patient/family/caregiver demonstrates understanding of disease process, treatment plan, medications, and discharge instructions  Description: Complete learning assessment and assess knowledge base  Interventions:  - Provide teaching at level of understanding  - Provide teaching via preferred learning methods  Outcome: Progressing     Problem: Nutrition/Hydration-ADULT  Goal: Nutrient/Hydration intake appropriate for improving, restoring or maintaining nutritional needs  Description: Monitor and assess patient's nutrition/hydration status for malnutrition  Collaborate with interdisciplinary team and initiate plan and interventions as ordered  Monitor patient's weight and dietary intake as ordered or per policy  Utilize nutrition screening tool and intervene as necessary  Determine patient's food preferences and provide high-protein, high-caloric foods as appropriate       INTERVENTIONS:  - Monitor oral intake, urinary output, labs, and treatment plans  - Assess nutrition and hydration status and recommend course of action  - Evaluate amount of meals eaten  - Assist patient with eating if necessary   - Allow adequate time for meals  - Recommend/ encourage appropriate diets, oral nutritional supplements, and vitamin/mineral supplements  - Order, calculate, and assess calorie counts as needed  - Recommend, monitor, and adjust tube feedings and TPN/PPN based on assessed needs  - Assess need for intravenous fluids  - Provide specific nutrition/hydration education as appropriate  - Include patient/family/caregiver in decisions related to nutrition  Outcome: Progressing     Problem: COPING  Goal: Pt/Family able to verbalize concerns and demonstrate effective coping strategies  Description: INTERVENTIONS:  - Assist patient/family to identify coping skills, available support systems and cultural and spiritual values  - Provide emotional support, including active listening and acknowledgement of concerns of patient and caregivers  - Reduce environmental stimuli, as able  - Provide patient education  - Assess for spiritual pain/suffering and initiate spiritual care, including notification of Pastoral Care or chidi based community as needed  - Assess effectiveness of coping strategies  Outcome: Progressing  Goal: Will report anxiety at manageable levels  Description: INTERVENTIONS:  - Administer medication as ordered  - Teach and encourage coping skills  - Provide emotional support  - Assess patient/family for anxiety and ability to cope  Outcome: Progressing     Problem: SKIN/TISSUE INTEGRITY - ADULT  Goal: Skin Integrity remains intact(Skin Breakdown Prevention)  Description: Assess:  -Perform Nicanor assessment every shift  -Clean and moisturize skin every 2 hours  -Inspect skin when repositioning, toileting, and assisting with ADLS  -Assess under medical devices such as picc every shift  -Assess extremities for adequate circulation and sensation     Bed Management:  -Have minimal linens on bed & keep smooth, unwrinkled  -Change linens as needed when moist or perspiring  -Avoid sitting or lying in one position for more than 2 hours while in bed  -Keep HOB at 30 degrees     Toileting:  -Offer bedside commode  -Assess for incontinence every 2 hours  -Use incontinent care products after each incontinent episode such as cleanser    Activity:  -Encourage activity and walks on unit  -Encourage or provide ROM exercises   -Turn and reposition patient every 2 Hours  -Use appropriate equipment to lift or move patient in bed      Skin Care:  -Avoid use of baby powder, tape, friction and shearing, hot water or constrictive clothing  -Relieve pressure over bony prominences using pillows, wedges  -Do not massage red bony areas    Next Steps:  -Teach patient strategies to minimize risks such as falling   -Consider consults to  interdisciplinary teams such as pt/ot  Outcome: Progressing  Goal: Incision(s), wounds(s) or drain site(s) healing without S/S of infection  Description: INTERVENTIONS  - Assess and document dressing, incision, wound bed, drain sites and surrounding tissue  - Provide patient and family education  - Perform skin care/dressing changes every daily  Outcome: Progressing

## 2023-01-31 LAB
ALBUMIN SERPL BCP-MCNC: 2.2 G/DL (ref 3.5–5)
ALP SERPL-CCNC: 164 U/L (ref 34–104)
ALT SERPL W P-5'-P-CCNC: 8 U/L (ref 7–52)
ANION GAP SERPL CALCULATED.3IONS-SCNC: 6 MMOL/L (ref 4–13)
AST SERPL W P-5'-P-CCNC: 13 U/L (ref 13–39)
ATRIAL RATE: 91 BPM
BASOPHILS # BLD AUTO: 0.09 THOUSANDS/ÂΜL (ref 0–0.1)
BASOPHILS NFR BLD AUTO: 1 % (ref 0–1)
BILIRUB SERPL-MCNC: 0.26 MG/DL (ref 0.2–1)
BUN SERPL-MCNC: 5 MG/DL (ref 5–25)
CALCIUM ALBUM COR SERPL-MCNC: 8.6 MG/DL (ref 8.3–10.1)
CALCIUM SERPL-MCNC: 7.2 MG/DL (ref 8.4–10.2)
CHLORIDE SERPL-SCNC: 105 MMOL/L (ref 96–108)
CO2 SERPL-SCNC: 28 MMOL/L (ref 21–32)
CREAT SERPL-MCNC: 0.39 MG/DL (ref 0.6–1.3)
EOSINOPHIL # BLD AUTO: 2.89 THOUSAND/ÂΜL (ref 0–0.61)
EOSINOPHIL NFR BLD AUTO: 17 % (ref 0–6)
ERYTHROCYTE [DISTWIDTH] IN BLOOD BY AUTOMATED COUNT: 16.7 % (ref 11.6–15.1)
GFR SERPL CREATININE-BSD FRML MDRD: 113 ML/MIN/1.73SQ M
GLUCOSE SERPL-MCNC: 108 MG/DL (ref 65–140)
GLUCOSE SERPL-MCNC: 80 MG/DL (ref 65–140)
GLUCOSE SERPL-MCNC: 85 MG/DL (ref 65–140)
GLUCOSE SERPL-MCNC: 86 MG/DL (ref 65–140)
GLUCOSE SERPL-MCNC: 94 MG/DL (ref 65–140)
GLUCOSE SERPL-MCNC: 98 MG/DL (ref 65–140)
HCT VFR BLD AUTO: 23.3 % (ref 34.8–46.1)
HGB BLD-MCNC: 7.4 G/DL (ref 11.5–15.4)
IMM GRANULOCYTES # BLD AUTO: 0.14 THOUSAND/UL (ref 0–0.2)
IMM GRANULOCYTES NFR BLD AUTO: 1 % (ref 0–2)
INR PPP: 2.32 (ref 0.84–1.19)
LYMPHOCYTES # BLD AUTO: 1.51 THOUSANDS/ÂΜL (ref 0.6–4.47)
LYMPHOCYTES NFR BLD AUTO: 9 % (ref 14–44)
MAGNESIUM SERPL-MCNC: 1.8 MG/DL (ref 1.9–2.7)
MCH RBC QN AUTO: 27.8 PG (ref 26.8–34.3)
MCHC RBC AUTO-ENTMCNC: 31.8 G/DL (ref 31.4–37.4)
MCV RBC AUTO: 88 FL (ref 82–98)
MONOCYTES # BLD AUTO: 0.77 THOUSAND/ÂΜL (ref 0.17–1.22)
MONOCYTES NFR BLD AUTO: 5 % (ref 4–12)
MYCOBACTERIUM SPEC CULT: NORMAL
NEUTROPHILS # BLD AUTO: 11.62 THOUSANDS/ÂΜL (ref 1.85–7.62)
NEUTS SEG NFR BLD AUTO: 67 % (ref 43–75)
NRBC BLD AUTO-RTO: 0 /100 WBCS
P AXIS: 33 DEGREES
PLATELET # BLD AUTO: 406 THOUSANDS/UL (ref 149–390)
PMV BLD AUTO: 8.4 FL (ref 8.9–12.7)
POTASSIUM SERPL-SCNC: 3.1 MMOL/L (ref 3.5–5.3)
PR INTERVAL: 132 MS
PROT SERPL-MCNC: 5.1 G/DL (ref 6.4–8.4)
PROTHROMBIN TIME: 25.3 SECONDS (ref 11.6–14.5)
QRS AXIS: 11 DEGREES
QRSD INTERVAL: 88 MS
QT INTERVAL: 416 MS
QTC INTERVAL: 511 MS
RBC # BLD AUTO: 2.66 MILLION/UL (ref 3.81–5.12)
RHODAMINE-AURAMINE STN SPEC: NORMAL
SODIUM SERPL-SCNC: 139 MMOL/L (ref 135–147)
T WAVE AXIS: 95 DEGREES
VENTRICULAR RATE: 91 BPM
WBC # BLD AUTO: 17.02 THOUSAND/UL (ref 4.31–10.16)

## 2023-01-31 RX ORDER — POTASSIUM CHLORIDE 20MEQ/15ML
40 LIQUID (ML) ORAL
Status: COMPLETED | OUTPATIENT
Start: 2023-01-31 | End: 2023-01-31

## 2023-01-31 RX ORDER — DOXYCYCLINE HYCLATE 100 MG/1
100 CAPSULE ORAL EVERY 12 HOURS SCHEDULED
Status: DISCONTINUED | OUTPATIENT
Start: 2023-01-31 | End: 2023-02-03 | Stop reason: HOSPADM

## 2023-01-31 RX ORDER — HYDRALAZINE HYDROCHLORIDE 25 MG/1
50 TABLET, FILM COATED ORAL EVERY 8 HOURS SCHEDULED
Status: DISCONTINUED | OUTPATIENT
Start: 2023-01-31 | End: 2023-02-02

## 2023-01-31 RX ORDER — AMOXICILLIN AND CLAVULANATE POTASSIUM 875; 125 MG/1; MG/1
1 TABLET, FILM COATED ORAL EVERY 12 HOURS SCHEDULED
Status: DISCONTINUED | OUTPATIENT
Start: 2023-02-02 | End: 2023-02-03 | Stop reason: HOSPADM

## 2023-01-31 RX ADMIN — AMIODARONE HYDROCHLORIDE 400 MG: 200 TABLET ORAL at 16:47

## 2023-01-31 RX ADMIN — AMPICILLIN SODIUM 2000 MG: 2 INJECTION, POWDER, FOR SOLUTION INTRAMUSCULAR; INTRAVENOUS at 21:57

## 2023-01-31 RX ADMIN — MEROPENEM 2000 MG: 1 INJECTION, POWDER, FOR SOLUTION INTRAVENOUS at 18:27

## 2023-01-31 RX ADMIN — POTASSIUM CHLORIDE 40 MEQ: 1.5 SOLUTION ORAL at 11:23

## 2023-01-31 RX ADMIN — POTASSIUM CHLORIDE 40 MEQ: 1.5 SOLUTION ORAL at 13:09

## 2023-01-31 RX ADMIN — SENNOSIDES 8.6 MG: 8.6 TABLET ORAL at 21:56

## 2023-01-31 RX ADMIN — POTASSIUM CHLORIDE 40 MEQ: 1.5 SOLUTION ORAL at 16:47

## 2023-01-31 RX ADMIN — HYDRALAZINE HYDROCHLORIDE 50 MG: 25 TABLET, FILM COATED ORAL at 21:57

## 2023-01-31 RX ADMIN — AMPICILLIN SODIUM 2000 MG: 2 INJECTION, POWDER, FOR SOLUTION INTRAMUSCULAR; INTRAVENOUS at 03:26

## 2023-01-31 RX ADMIN — MICAFUNGIN SODIUM 150 MG: 50 INJECTION, POWDER, LYOPHILIZED, FOR SOLUTION INTRAVENOUS at 20:19

## 2023-01-31 RX ADMIN — DOXYCYCLINE 100 MG: 100 CAPSULE ORAL at 20:18

## 2023-01-31 RX ADMIN — OXYCODONE 5 MG: 5 TABLET ORAL at 21:57

## 2023-01-31 RX ADMIN — MEROPENEM 2000 MG: 1 INJECTION, POWDER, FOR SOLUTION INTRAVENOUS at 11:23

## 2023-01-31 RX ADMIN — AMPICILLIN SODIUM 2000 MG: 2 INJECTION, POWDER, FOR SOLUTION INTRAMUSCULAR; INTRAVENOUS at 18:24

## 2023-01-31 RX ADMIN — THIAMINE HCL TAB 100 MG 100 MG: 100 TAB at 08:39

## 2023-01-31 RX ADMIN — AMIODARONE HYDROCHLORIDE 400 MG: 200 TABLET ORAL at 08:00

## 2023-01-31 RX ADMIN — BUPROPION HYDROCHLORIDE 75 MG: 75 TABLET, FILM COATED ORAL at 08:39

## 2023-01-31 RX ADMIN — HYDRALAZINE HYDROCHLORIDE 25 MG: 25 TABLET, FILM COATED ORAL at 06:07

## 2023-01-31 RX ADMIN — WARFARIN SODIUM 3 MG: 3 TABLET ORAL at 18:39

## 2023-01-31 RX ADMIN — CLOPIDOGREL BISULFATE 75 MG: 75 TABLET ORAL at 08:39

## 2023-01-31 RX ADMIN — INSULIN GLARGINE 20 UNITS: 100 INJECTION, SOLUTION SUBCUTANEOUS at 08:40

## 2023-01-31 RX ADMIN — HYDRALAZINE HYDROCHLORIDE 50 MG: 25 TABLET, FILM COATED ORAL at 14:29

## 2023-01-31 RX ADMIN — PANTOPRAZOLE SODIUM 40 MG: 40 TABLET, DELAYED RELEASE ORAL at 06:08

## 2023-01-31 RX ADMIN — OXYCODONE 5 MG: 5 TABLET ORAL at 16:47

## 2023-01-31 RX ADMIN — AMPICILLIN SODIUM 2000 MG: 2 INJECTION, POWDER, FOR SOLUTION INTRAMUSCULAR; INTRAVENOUS at 08:39

## 2023-01-31 RX ADMIN — BUPROPION HYDROCHLORIDE 75 MG: 75 TABLET, FILM COATED ORAL at 18:39

## 2023-01-31 RX ADMIN — OXYCODONE 5 MG: 5 TABLET ORAL at 06:07

## 2023-01-31 RX ADMIN — SERTRALINE HYDROCHLORIDE 100 MG: 100 TABLET, FILM COATED ORAL at 08:40

## 2023-01-31 RX ADMIN — METOPROLOL TARTRATE 25 MG: 25 TABLET, FILM COATED ORAL at 20:18

## 2023-01-31 RX ADMIN — LISINOPRIL 40 MG: 20 TABLET ORAL at 08:40

## 2023-01-31 RX ADMIN — OXYCODONE 5 MG: 5 TABLET ORAL at 11:23

## 2023-01-31 RX ADMIN — MEROPENEM 2000 MG: 1 INJECTION, POWDER, FOR SOLUTION INTRAVENOUS at 03:00

## 2023-01-31 RX ADMIN — AMLODIPINE BESYLATE 10 MG: 10 TABLET ORAL at 08:40

## 2023-01-31 RX ADMIN — AMPICILLIN SODIUM 2000 MG: 2 INJECTION, POWDER, FOR SOLUTION INTRAMUSCULAR; INTRAVENOUS at 11:23

## 2023-01-31 RX ADMIN — METOPROLOL TARTRATE 25 MG: 25 TABLET, FILM COATED ORAL at 08:39

## 2023-01-31 RX ADMIN — AMPICILLIN SODIUM 2000 MG: 2 INJECTION, POWDER, FOR SOLUTION INTRAMUSCULAR; INTRAVENOUS at 14:29

## 2023-01-31 NOTE — PLAN OF CARE
Problem: MOBILITY - ADULT  Goal: Maintain or return to baseline ADL function  Description: INTERVENTIONS:  -  Assess patient's ability to carry out ADLs; assess patient's baseline for ADL function and identify physical deficits which impact ability to perform ADLs (bathing, care of mouth/teeth, toileting, grooming, dressing, etc )  - Assess/evaluate cause of self-care deficits   - Assess range of motion  - Assess patient's mobility; develop plan if impaired  - Assess patient's need for assistive devices and provide as appropriate  - Encourage maximum independence but intervene and supervise when necessary  - Involve family in performance of ADLs  - Assess for home care needs following discharge   - Consider OT consult to assist with ADL evaluation and planning for discharge  - Provide patient education as appropriate  Outcome: Progressing  Goal: Maintains/Returns to pre admission functional level  Description: INTERVENTIONS:  - Perform BMAT or MOVE assessment daily    - Set and communicate daily mobility goal to care team and patient/family/caregiver  - Collaborate with rehabilitation services on mobility goals if consulted  - Perform Range of Motion 4 times a day  - Reposition patient every 2 hours    - Dangle patient 4 times a day  - Stand patient 4 times a day  - Ambulate patient 4 times a day  - Out of bed to chair 4 times a day   - Out of bed for meals 4 times a day  - Out of bed for toileting  - Record patient progress and toleration of activity level   Outcome: Progressing     Problem: Prexisting or High Potential for Compromised Skin Integrity  Goal: Skin integrity is maintained or improved  Description: INTERVENTIONS:  - Identify patients at risk for skin breakdown  - Assess and monitor skin integrity  - Assess and monitor nutrition and hydration status  - Monitor labs   - Assess for incontinence   - Turn and reposition patient  - Assist with mobility/ambulation  - Relieve pressure over bony prominences  - Avoid friction and shearing  - Provide appropriate hygiene as needed including keeping skin clean and dry  - Evaluate need for skin moisturizer/barrier cream  - Collaborate with interdisciplinary team   - Patient/family teaching  - Consider wound care consult   Outcome: Progressing     Problem: PAIN - ADULT  Goal: Verbalizes/displays adequate comfort level or baseline comfort level  Description: Interventions:  - Encourage patient to monitor pain and request assistance  - Assess pain using appropriate pain scale  - Administer analgesics based on type and severity of pain and evaluate response  - Implement non-pharmacological measures as appropriate and evaluate response  - Consider cultural and social influences on pain and pain management  - Notify physician/advanced practitioner if interventions unsuccessful or patient reports new pain  Outcome: Progressing     Problem: INFECTION - ADULT  Goal: Absence or prevention of progression during hospitalization  Description: INTERVENTIONS:  - Assess and monitor for signs and symptoms of infection  - Monitor lab/diagnostic results  - Monitor all insertion sites, i e  indwelling lines, tubes, and drains  - Monitor endotracheal if appropriate and nasal secretions for changes in amount and color  - [place] appropriate cooling/warming therapies per order  - Administer medications as ordered  - Instruct and encourage patient and family to use good hand hygiene technique  - Identify and instruct in appropriate isolation precautions for identified infection/condition  Outcome: Progressing     Problem: SAFETY ADULT  Goal: Maintain or return to baseline ADL function  Description: INTERVENTIONS:  -  Assess patient's ability to carry out ADLs; assess patient's baseline for ADL function and identify physical deficits which impact ability to perform ADLs (bathing, care of mouth/teeth, toileting, grooming, dressing, etc )  - Assess/evaluate cause of self-care deficits - Assess range of motion  - Assess patient's mobility; develop plan if impaired  - Assess patient's need for assistive devices and provide as appropriate  - Encourage maximum independence but intervene and supervise when necessary  - Involve family in performance of ADLs  - Assess for home care needs following discharge   - Consider OT consult to assist with ADL evaluation and planning for discharge  - Provide patient education as appropriate  Outcome: Progressing  Goal: Maintains/Returns to pre admission functional level  Description: INTERVENTIONS:  - Perform BMAT or MOVE assessment daily    - Set and communicate daily mobility goal to care team and patient/family/caregiver  - Collaborate with rehabilitation services on mobility goals if consulted  - Perform Range of Motion 4 times a day  - Reposition patient every 2 hours    - Dangle patient 4 times a day  - Stand patient 4 times a day  - Ambulate patient 4 times a day  - Out of bed to chair 4 times a day   - Out of bed for meals 4 times a day  - Out of bed for toileting  - Record patient progress and toleration of activity level   Outcome: Progressing  Goal: Patient will remain free of falls  Description: INTERVENTIONS:  -  Assess patient's ability to carry out ADLs; assess patient's baseline for ADL function and identify physical deficits which impact ability to perform ADLs (bathing, care of mouth/teeth, toileting, grooming, dressing, etc )  - Assess/evaluate cause of self-care deficits   - Assess range of motion  - Assess patient's mobility; develop plan if impaired  - Assess patient's need for assistive devices and provide as appropriate  - Encourage maximum independence but intervene and supervise when necessary  - Involve family in performance of ADLs  - Assess for home care needs following discharge   - Consider OT consult to assist with ADL evaluation and planning for discharge  - Provide patient education as appropriate  Outcome: Progressing Problem: DISCHARGE PLANNING  Goal: Discharge to home or other facility with appropriate resources  Description: INTERVENTIONS:  - Identify barriers to discharge w/patient and caregiver  - Arrange for needed discharge resources and transportation as appropriate  - Identify discharge learning needs (meds, wound care, etc )  - Arrange for interpretive services to assist at discharge as needed  - Refer to Case Management Department for coordinating discharge planning if the patient needs post-hospital services based on physician/advanced practitioner order or complex needs related to functional status, cognitive ability, or social support system  Outcome: Progressing     Problem: Knowledge Deficit  Goal: Patient/family/caregiver demonstrates understanding of disease process, treatment plan, medications, and discharge instructions  Description: Complete learning assessment and assess knowledge base  Interventions:  - Provide teaching at level of understanding  - Provide teaching via preferred learning methods  Outcome: Progressing     Problem: Nutrition/Hydration-ADULT  Goal: Nutrient/Hydration intake appropriate for improving, restoring or maintaining nutritional needs  Description: Monitor and assess patient's nutrition/hydration status for malnutrition  Collaborate with interdisciplinary team and initiate plan and interventions as ordered  Monitor patient's weight and dietary intake as ordered or per policy  Utilize nutrition screening tool and intervene as necessary  Determine patient's food preferences and provide high-protein, high-caloric foods as appropriate       INTERVENTIONS:  - Monitor oral intake, urinary output, labs, and treatment plans  - Assess nutrition and hydration status and recommend course of action  - Evaluate amount of meals eaten  - Assist patient with eating if necessary   - Allow adequate time for meals  - Recommend/ encourage appropriate diets, oral nutritional supplements, and vitamin/mineral supplements  - Order, calculate, and assess calorie counts as needed  - Recommend, monitor, and adjust tube feedings and TPN/PPN based on assessed needs  - Assess need for intravenous fluids  - Provide specific nutrition/hydration education as appropriate  - Include patient/family/caregiver in decisions related to nutrition  Outcome: Progressing     Problem: COPING  Goal: Pt/Family able to verbalize concerns and demonstrate effective coping strategies  Description: INTERVENTIONS:  - Assist patient/family to identify coping skills, available support systems and cultural and spiritual values  - Provide emotional support, including active listening and acknowledgement of concerns of patient and caregivers  - Reduce environmental stimuli, as able  - Provide patient education  - Assess for spiritual pain/suffering and initiate spiritual care, including notification of Pastoral Care or chidi based community as needed  - Assess effectiveness of coping strategies  Outcome: Progressing  Goal: Will report anxiety at manageable levels  Description: INTERVENTIONS:  - Administer medication as ordered  - Teach and encourage coping skills  - Provide emotional support  - Assess patient/family for anxiety and ability to cope  Outcome: Progressing     Problem: SKIN/TISSUE INTEGRITY - ADULT  Goal: Skin Integrity remains intact(Skin Breakdown Prevention)  Description: Assess:  -Perform Nicanor assessment every shift  -Clean and moisturize skin every 2 hours  -Inspect skin when repositioning, toileting, and assisting with ADLS  -Assess under medical devices such as picc every shift  -Assess extremities for adequate circulation and sensation     Bed Management:  -Have minimal linens on bed & keep smooth, unwrinkled  -Change linens as needed when moist or perspiring  -Avoid sitting or lying in one position for more than 2 hours while in bed  -Keep HOB at 30 degrees     Toileting:  -Offer bedside commode  -Assess for incontinence every 2 hours  -Use incontinent care products after each incontinent episode such as cleanser    Activity:  -Encourage activity and walks on unit  -Encourage or provide ROM exercises   -Turn and reposition patient every 2 Hours  -Use appropriate equipment to lift or move patient in bed      Skin Care:  -Avoid use of baby powder, tape, friction and shearing, hot water or constrictive clothing  -Relieve pressure over bony prominences using pillows, wedges  -Do not massage red bony areas    Next Steps:  -Teach patient strategies to minimize risks such as falling   -Consider consults to  interdisciplinary teams such as pt/ot  Outcome: Progressing  Goal: Incision(s), wounds(s) or drain site(s) healing without S/S of infection  Description: INTERVENTIONS  - Assess and document dressing, incision, wound bed, drain sites and surrounding tissue  - Provide patient and family education  - Perform skin care/dressing changes every daily  Outcome: Progressing

## 2023-01-31 NOTE — PROGRESS NOTES
[address] [facility]  Progress Note - [address] 1960, 65 y o  female MRN: [id number]  Unit/Bed#: [place] [address] -01 Encounter: [id number]  Primary Care Provider: Martyn Kayser, CRNP   Date and time admitted to hospital: 1/17/2023  3:48 PM    Osteomyelitis [facility])  Assessment & Plan  S/p right - amputation revision stump    Bacteremia  Assessment & Plan  Polymicrobial bacteremia (Enterobacter / Enterococcus)  · Antibiotic management per infectious disease  Currently on meropenem / Ampicillin  · Last blood cultures negative on 1/21    Hypomagnesemia  Assessment & Plan  Replete prn    Cardiac arrest [facility])  Assessment & Plan  Cardiac arrest (1/18/23) presumably secondary to electrolyte abnormalities and/or severe sepsis  · Cardiology recommendations appreciated  · Continue with amiodarone, Norvasc, hydralazine, lisinopril, metoprolol  · Of note, she is on twice daily amiodarone  Will defer to cardiology when it would be appropriate to adjust her dose  Fall  Assessment & Plan  Presents from facility with altered mental status, status post fall  Trauma scans negative     History of CVA (cerebrovascular accident)  Assessment & Plan  Patient had stroke on 11/14 right hemispheric, with residual left-sided weakness  HIT (heparin-induced thrombocytopenia)  Assessment & Plan  History of HIT   On warfarin  Goal INR 2-3  Decrease dose back to 3mg from 5mg      Recent Labs     01/29/23  0939 01/30/23  0509   INR 1 96* 2 21*         Anemia  Assessment & Plan  Hemoglobin 7 4 on a m  labs; up from 7 1 yesterday  Monitor and transfuse for hemoglobin of 7 or less    PAD (peripheral artery disease) (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 [facility])  Assessment & Plan  Lab Results   Component Value Date    HGBA1C 9 8 (H) 10/25/2022       Recent Labs     01/30/23  1625 01/30/23  2056 01/31/23  0059 01/31/23  0549   POCGLU 142* 122 86 94       Blood Sugar Average: Last 72 hrs:  (P) 455 [id number]     Home regimen: Lantus 60U HS, Novolog 2oU TID prior to meals  Inpatient regimen: Lantus 20UHS, sliding scale  Moderate protein-calorie malnutrition ([facility] [address] )  Assessment & Plan  Malnutrition Findings:                                 BMI Findings: Body mass index is 27 29 kg/m²  Nutrition and speech therapy consult    Hypertension  Assessment & Plan  Patient currently on amlodipine, hydralazine, and lisinopril  Blood pressure noted to be highly elevated; increased hydralazine to 50 mg 3 times daily    Esophageal reflux  Assessment & Plan  Continue PPI     * Sepsis due to urinary tract infection ([facility] [address] )  Assessment & Plan  She presented with altered mental status, generalized weakness, and fall secondary to sepsis  Etiology secondary to complicated UTI , polymicrobial bacteremia, and 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 further worsened by her cardiac arrest  She is S/p right amputation revision stump  · Antibiotic management per infectious disease  Currently on Meropenem and ampicillin  Hyponatremia-resolved as of 1/23/2023  Assessment & Plan  Likely hypovolemic due to sepsis  Recheck BMP in the morning        VTE Pharmacologic Prophylaxis:   Pharmacologic: Warfarin (Coumadin)  Mechanical VTE Prophylaxis in Place: No    Patient Centered Rounds: I have performed bedside rounds with nursing staff today      Discussions with Specialists or Other Care Team Provider: Infectious disease    Education and Discussions with Family / Patient: Discussed treatment plan with family and patient who agree with current plan; encouraged to ask questions and participate  Time Spent for Care: 45 minutes  More than 50% of total time spent on counseling and coordination of care as described above  Current Length of Stay: 14 day(s)    Current Patient Status: Inpatient   Certification Statement: The patient will continue to require additional inpatient hospital stay due to Treatment of sepsis    Discharge Plan: To be determined    Code Status: Level 3 - DNAR and DNI      Subjective:   Patient seen and examined bedside, no acute distress or discomfort noted  Patient here for treatment of sepsis due to UTI versus right AKA stump infection; currently on meropenem and ampicillin along with micafungin as yeast was isolated from wound cultures  Blood cultures negative on ; patient may be transition to oral antibiotics in the next 24 to 48 hours; but will require antifungal treatment for up to 6 months  Potassium repleted today; BP medications increased as blood pressure uncontrolled  Monitor on morning labs; supportive care  Objective:     Vitals:   Temp (24hrs), Av 2 °F (36 8 °C), Min:97 9 °F (36 6 °C), Max:98 9 °F (37 2 °C)    Temp:  [97 9 °F (36 6 °C)-98 9 °F (37 2 °C)] 97 9 °F (36 6 °C)  HR:  [90-93] 91  Resp:  [18-20] 18  BP: (152-193)/(75-98) 172/88  SpO2:  [95 %-98 %] 96 %  Body mass index is 27 29 kg/m²  Input and Output Summary (last 24 hours): Intake/Output Summary (Last 24 hours) at 2023 1452  Last data filed at 2023 1901  Gross per 24 hour   Intake --   Output 900 ml   Net -900 ml       Physical Exam:     Physical Exam  Vitals and nursing note reviewed  Constitutional:       General: She is not in acute distress  Appearance: She is well-developed  HENT:      Head: Normocephalic and atraumatic     Eyes:      Conjunctiva/sclera: Conjunctivae normal    Cardiovascular:      Rate and Rhythm: Normal rate and regular rhythm  Heart sounds: No murmur heard  Pulmonary:      Effort: Pulmonary effort is normal  No respiratory distress  Breath sounds: Normal breath sounds  Abdominal:      Palpations: Abdomen is soft  Tenderness: There is no abdominal tenderness  Musculoskeletal:         General: Tenderness, deformity and signs of injury present  No swelling  Cervical back: Neck supple  Comments: Bilateral AKA   Skin:     General: Skin is warm and dry  Findings: Erythema and rash present  Neurological:      Mental Status: She is alert  Psychiatric:         Mood and Affect: Mood normal            Additional Data:     Labs:    Results from last 7 days   Lab Units 01/31/23  0612   WBC Thousand/uL 17 02*   HEMOGLOBIN g/dL 7 4*   HEMATOCRIT % 23 3*   PLATELETS Thousands/uL 406*   NEUTROS PCT % 67   LYMPHS PCT % 9*   MONOS PCT % 5   EOS PCT % 17*     Results from last 7 days   Lab Units 01/31/23  0612   SODIUM mmol/L 139   POTASSIUM mmol/L 3 1*   CHLORIDE mmol/L 105   CO2 mmol/L 28   BUN mg/dL 5   CREATININE mg/dL 0 39*   ANION GAP mmol/L 6   CALCIUM mg/dL 7 2*   ALBUMIN g/dL 2 2*   TOTAL BILIRUBIN mg/dL 0 26   ALK PHOS U/L 164*   ALT U/L 8   AST U/L 13   GLUCOSE RANDOM mg/dL 85     Results from last 7 days   Lab Units 01/31/23  0612   INR  2 32*     Results from last 7 days   Lab Units 01/31/23  1115 01/31/23  0549 01/31/23  0059 01/30/23  2056 01/30/23  1625 01/30/23  1129 01/30/23  0744 01/30/23  0456 01/30/23  0053 01/29/23  2151 01/29/23  1644 01/29/23  1103   POC GLUCOSE mg/dl 80 94 86 122 142* 103 103 115 85 125 105 178*         Results from last 7 days   Lab Units 01/25/23  0431   PROCALCITONIN ng/ml 0 09           * I Have Reviewed All Lab Data Listed Above  * Additional Pertinent Lab Tests Reviewed:  All Labs Within Last 24 Hours Reviewed    Imaging:    Imaging Reports Reviewed Today Include:   Imaging Personally Reviewed by Myself Includes:      Recent Cultures (last 7 days):     Results from last 7 days   Lab Units 01/27/23  1610 01/24/23  1703   GRAM STAIN RESULT  2+ Polys  No bacteria seen No Polys or Bacteria seen  1+ Polys  No bacteria seen   WOUND CULTURE   --  Growth in Broth culture only Candida albicans*  Few Colonies of       Last 24 Hours Medication List:   Current Facility-Administered Medications   Medication Dose Route Frequency Provider Last Rate   • acetaminophen  650 mg Oral Q6H PRN Ara Velasco MD     • amiodarone  400 mg Oral BID With Meals Va Simental MD     • amLODIPine  10 mg Oral Daily Va Simental MD     • ampicillin  2,000 mg Intravenous Q4H Va Simental MD 2,000 mg (01/31/23 1429)   • bisacodyl  10 mg Rectal Daily PRN Va Simental MD     • buPROPion  75 mg Per NG Tube BID Va Simental MD     • clopidogrel  75 mg Oral Daily Va Simental MD     • hydrALAZINE  20 mg Intravenous Q4H PRN Va Simental MD     • hydrALAZINE  50 mg Oral [facility] Hans Rosales MD     • HYDROmorphone  0 2 mg Intravenous Q4H PRN Va Simental MD     • insulin glargine  20 Units Subcutaneous QAM Va Simental MD     • insulin lispro  2-12 Units Subcutaneous Q6H [address] Va Simental MD     • lisinopril  40 mg Oral Daily Va Simental MD     • meropenem  2,000 mg Intravenous Q8H Va Simental MD 2,000 mg (01/31/23 1123)   • metoprolol tartrate  25 mg Oral Q12H [address] Va Simental MD     • micafungin  150 mg Intravenous Q24H Casey Qiu  mg (01/30/23 2134)   • oxyCODONE  5 mg Oral Q4H PRN Ara Velasco MD     • oxyCODONE  5 mg Oral Q6H Ara Velasco MD     • pantoprazole  40 mg Oral Early Morning Ara Velasco MD     • potassium chloride  40 mEq Oral Q2H Hans Rosales MD     • senna  1 tablet Oral HS Va Simental MD     • sertraline  100 mg Oral Daily Va Simental MD     • thiamine  100 mg Oral Daily Va Simental MD     • warfarin  3 mg Oral Daily (warfarin) Mehnaz Large MD Nader          Today, Patient Was Seen By: Shelby Tovar MD    ** Please Note: Dictation voice to text software may have been used in the creation of this document   **

## 2023-01-31 NOTE — ASSESSMENT & PLAN NOTE
Malnutrition Findings:                                 BMI Findings: Body mass index is 27 29 kg/m²     Nutrition and speech therapy consult

## 2023-01-31 NOTE — CASE MANAGEMENT
Case Management Discharge Planning Note    Patient name Stevo Remy  [facility] [address] Lilian Banuelos* MRN [id number]  : 1960 Date 2023       Current Admission Date: 2023  Current Admission Diagnosis:Sepsis due to urinary tract infection [facility])   Patient Active Problem List    Diagnosis Date Noted   • Osteomyelitis ([address] ) 2023   • Altered mental status 2023   • Forehead laceration 2023   • Bacteremia 2023   • Cardiac arrest ([address] ) 2023   • Troponin level elevated 2023   • Hypomagnesemia 2023   • Sepsis due to urinary tract infection ([address] ) 2023   • Fall 2023   • Mild protein-calorie malnutrition ([address] ) 2022   • Diarrhea 2022   • CVA (cerebral vascular accident) ([address] ) 2022   • HIT (heparin-induced thrombocytopenia) 2022   • Diabetic ulcer of heel associated with diabetes mellitus due to underlying condition ([address] ) 11/10/2022   • Anemia 10/30/2022   • Generalized edema due to fluid overload 10/27/2022   • PAD (peripheral artery disease) ([address] ) 10/25/2022   • Non healing left heel wound 10/22/2022   • Hypertensive urgency 10/22/2022   • Hypokalemia 10/22/2022   • Wound of lower extremity 10/21/2022   • Epigastric pain 2022   • Type 2 diabetes mellitus with hyperglycemia, with long-term current use of insulin ([address] ) 10/18/2021   • Hyperparathyroidism ([address] ) 10/18/2021   • 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 ([address] ) 2021   • 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   • Moderate protein-calorie malnutrition ([address] ) 2020   • Acute colitis 2020   • Arthritis 2019   • Depression, recurrent ([address] ) 2018   • Class 3 severe obesity due to excess calories with serious comorbidity and body mass index (BMI) of 40 0 to 44 9 in adult ([facility] ) 2018   • Esophageal reflux 2018   • DM (diabetes mellitus) type II, controlled, with peripheral vascular disorder ([facility] ) 02/23/2018   • Diabetic peripheral neuropathy ([place] [address] ) 33/61/1635   • Systolic murmur 58/84/9789   • Stroke ([place] [address] ) 12/14/2015   • History of CVA (cerebrovascular accident) 12/14/2015   • Anxiety 03/19/2015   • Vitamin D deficiency 03/19/2015   • Hypertension 08/28/2012   • Familial combined hyperlipidemia 08/28/2012      LOS (days): 14  Geometric Mean LOS (GMLOS) (days): 5 00  Days to GMLOS:-8 8     OBJECTIVE:  Risk of Unplanned Readmission Score: 42 44         Current admission status: Inpatient   Preferred Pharmacy:   [id number] [address] [place] [address] [address] [place] [address]  Phone: [phone] Fax: [phone]    Primary Care Provider: ALEX Herzog    Primary Insurance: [facility]  Secondary Insurance:     DISCHARGE DETAILS:    Other Referral/Resources/Interventions Provided:  Referral Comments: per Sharyle Gilford at [facility]; they would still be willing to readmit pt after 15 day bed hold expires

## 2023-01-31 NOTE — ASSESSMENT & PLAN NOTE
Hemoglobin 7 4 on a m  labs; up from 7 1 yesterday  Monitor and transfuse for hemoglobin of 7 or less

## 2023-01-31 NOTE — PLAN OF CARE
Problem: MOBILITY - ADULT  Goal: Maintain or return to baseline ADL function  Description: INTERVENTIONS:  -  Assess patient's ability to carry out ADLs; assess patient's baseline for ADL function and identify physical deficits which impact ability to perform ADLs (bathing, care of mouth/teeth, toileting, grooming, dressing, etc )  - Assess/evaluate cause of self-care deficits   - Assess range of motion  - Assess patient's mobility; develop plan if impaired  - Assess patient's need for assistive devices and provide as appropriate  - Encourage maximum independence but intervene and supervise when necessary  - Involve family in performance of ADLs  - Assess for home care needs following discharge   - Consider OT consult to assist with ADL evaluation and planning for discharge  - Provide patient education as appropriate  Outcome: Progressing  Goal: Maintains/Returns to pre admission functional level  Description: INTERVENTIONS:  - Perform BMAT or MOVE assessment daily    - Set and communicate daily mobility goal to care team and patient/family/caregiver  - Collaborate with rehabilitation services on mobility goals if consulted  - Perform Range of Motion 4 times a day  - Reposition patient every 2 hours    - Dangle patient 4 times a day  - Stand patient 4 times a day  - Ambulate patient 4 times a day  - Out of bed to chair 4 times a day   - Out of bed for meals 4 times a day  - Out of bed for toileting  - Record patient progress and toleration of activity level   Outcome: Progressing     Problem: Prexisting or High Potential for Compromised Skin Integrity  Goal: Skin integrity is maintained or improved  Description: INTERVENTIONS:  - Identify patients at risk for skin breakdown  - Assess and monitor skin integrity  - Assess and monitor nutrition and hydration status  - Monitor labs   - Assess for incontinence   - Turn and reposition patient  - Assist with mobility/ambulation  - Relieve pressure over bony prominences  - Avoid friction and shearing  - Provide appropriate hygiene as needed including keeping skin clean and dry  - Evaluate need for skin moisturizer/barrier cream  - Collaborate with interdisciplinary team   - Patient/family teaching  - Consider wound care consult   Outcome: Progressing     Problem: PAIN - ADULT  Goal: Verbalizes/displays adequate comfort level or baseline comfort level  Description: Interventions:  - Encourage patient to monitor pain and request assistance  - Assess pain using appropriate pain scale  - Administer analgesics based on type and severity of pain and evaluate response  - Implement non-pharmacological measures as appropriate and evaluate response  - Consider cultural and social influences on pain and pain management  - Notify physician/advanced practitioner if interventions unsuccessful or patient reports new pain  Outcome: Progressing     Problem: INFECTION - ADULT  Goal: Absence or prevention of progression during hospitalization  Description: INTERVENTIONS:  - Assess and monitor for signs and symptoms of infection  - Monitor lab/diagnostic results  - Monitor all insertion sites, i e  indwelling lines, tubes, and drains  - Monitor endotracheal if appropriate and nasal secretions for changes in amount and color  - Union appropriate cooling/warming therapies per order  - Administer medications as ordered  - Instruct and encourage patient and family to use good hand hygiene technique  - Identify and instruct in appropriate isolation precautions for identified infection/condition  Outcome: Progressing     Problem: SAFETY ADULT  Goal: Maintain or return to baseline ADL function  Description: INTERVENTIONS:  -  Assess patient's ability to carry out ADLs; assess patient's baseline for ADL function and identify physical deficits which impact ability to perform ADLs (bathing, care of mouth/teeth, toileting, grooming, dressing, etc )  - Assess/evaluate cause of self-care deficits - Assess range of motion  - Assess patient's mobility; develop plan if impaired  - Assess patient's need for assistive devices and provide as appropriate  - Encourage maximum independence but intervene and supervise when necessary  - Involve family in performance of ADLs  - Assess for home care needs following discharge   - Consider OT consult to assist with ADL evaluation and planning for discharge  - Provide patient education as appropriate  Outcome: Progressing  Goal: Maintains/Returns to pre admission functional level  Description: INTERVENTIONS:  - Perform BMAT or MOVE assessment daily    - Set and communicate daily mobility goal to care team and patient/family/caregiver  - Collaborate with rehabilitation services on mobility goals if consulted  - Perform Range of Motion 4 times a day  - Reposition patient every 2 hours    - Dangle patient 4 times a day  - Stand patient 4 times a day  - Ambulate patient 4 times a day  - Out of bed to chair 4 times a day   - Out of bed for meals 4 times a day  - Out of bed for toileting  - Record patient progress and toleration of activity level   Outcome: Progressing  Goal: Patient will remain free of falls  Description: INTERVENTIONS:  - Educate patient/family on patient safety including physical limitations  - Instruct patient to call for assistance with activity   - Consult OT/PT to assist with strengthening/mobility   - Keep Call bell within reach  - Keep bed low and locked with side rails adjusted as appropriate  - Keep care items and personal belongings within reach  - Initiate and maintain comfort rounds  - Make Fall Risk Sign visible to staff  - Offer Toileting every 2 Hours, in advance of need  - Initiate/Maintain bed alarm  - Obtain necessary fall risk management equipment:   - Apply yellow socks and bracelet for high fall risk patients  - Consider moving patient to room near nurses station  Outcome: Progressing

## 2023-01-31 NOTE — ASSESSMENT & PLAN NOTE
Patient currently on amlodipine, hydralazine, and lisinopril  Blood pressure noted to be highly elevated; increased hydralazine to 50 mg 3 times daily

## 2023-01-31 NOTE — ASSESSMENT & PLAN NOTE
Lab Results   Component Value Date    HGBA1C 9 8 (H) 10/25/2022       Recent Labs     01/30/23  1625 01/30/23 2056 01/31/23  0059 01/31/23  0549   POCGLU 142* 122 86 94       Blood Sugar Average: Last 72 hrs:  (P) 051 [id number]     Home regimen: Lantus 60U HS, Novolog 2oU TID prior to meals  Inpatient regimen: Lantus 20UHS, sliding scale

## 2023-01-31 NOTE — ASSESSMENT & PLAN NOTE
Polymicrobial bacteremia (Enterobacter / Enterococcus)  · Antibiotic management per infectious disease  Currently on meropenem / Ampicillin    · Last blood cultures negative on 1/21

## 2023-01-31 NOTE — ASSESSMENT & PLAN NOTE
Cardiac arrest (1/18/23) presumably secondary to electrolyte abnormalities and/or severe sepsis  · Cardiology recommendations appreciated  · Continue with amiodarone, Norvasc, hydralazine, lisinopril, metoprolol  · Of note, she is on twice daily amiodarone  Will defer to cardiology when it would be appropriate to adjust her dose

## 2023-01-31 NOTE — PROGRESS NOTES
Progress Note - Infectious Disease   Job Iman 65 y o  female MRN: [id number]  Unit/Bed#: [place] [address] -01 Encounter: [id number]      Impression/Plan:  1   Sepsis, POA   Patient met sepsis criteria on presentation   Sources are 2 and 3  Course complicated by 4   Extensive resistance developing on prior cultures   2D echo technically difficult study   Extensive CT imaging without any focal findings initially   Clinical parameters overall improved except for leukocytosis   Ongoing white count likely related to the right stump, now downtrending post interventions  Antibiotics as below  Antifungal as below  Continue to trend fever curve/vitals  Repeat CBC and CMP tomorrow  Follow-up pending or cultures from surgical site for completeness  Maintain current PICC line  Additional supportive care as per primary  Duration of therapy as below     2   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   Blood cultures have isolated Enterobacter and Enterococcus faecalis  Enterobacter resistant to Ertapenem but no carbapenemase present on confirmatory testing   My suspicion overall remains for infection involving her right AKA stump given prior wound cultures and now findings on CT imaging with contrast  2D echo limited  No other obvious ectopic focus of infection on extensive imaging   Repeat cultures cleared rapidly   Wound cultures from 1/24 with a few colonies of staph likely colonizing the open wound and yeast only in broth culture likely not significant   Transesophageal echo completed on 1/27 which was without vegetation   Underwent debridement of the right stump with vascular on 1/27   OR cultures now showing yeast, Enterococcus and Enterobacter  We will need to follow-up susceptibilities  Plan for combination oral and IV therapy  EKG with prolonged QTC limiting antifungal options    Discontinue meropenem--completed at least 7 days for bacteremia  Transition to doxycycline 100 every 12 hours for Enterobacter isolated in bone  Continue ampicillin IV through 2/1 for enterococcal bacteremia  Transition to Augmentin 875 every 12 hours for Enterococcus in bone on 2/2  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 mg every 24 for fungal osteomyelitis  Patient's only oral option is Cresemba, likely because prohibitive for facility  Case management to reach out to facility  Continue to trend fever curve/vitals  Repeat CBC and CMP tomorrow  Follow up pending cultures for completeness  Plan for 6 weeks of antibiotic coverage for bone and joint infection  Plan for total 6 months of antifungal coverage for bone and joint infection  Maintain PICC line for now  We will arrange ID follow-up closer to discharge  Additional interventions pending clinical course     3   Complicated urinary tract infection   Patient unable to provide any history  Cata Lares was grossly abnormal on presentation   Possibly contributed to presentation   Higher suspicion for wound infection as above   Course of therapy completed for this issue  Continue antibiotic as above  Monitor abdominal exam  Catheters as per primary     4   Cardiac arrest   Suspect that this may have been unrelated to the above   Patient with multiple other comorbidities as well   Ongoing care and work-up as per critical care/cardiology  Patient extubated 1/20/2023     5   Transaminitis   Acute elevation noted likely in the setting of 4   We will need to monitor LFTs however on antibiotics above    Currently stable  Antibiotic as above  Repeat LFTs tomorrow  Monitor abdominal exam     Above plan discussed in detail with patient, case management, primary service and clinical ID pharmacist     ID consult service will continue to follow      Antibiotics:  Meropenem 14  Ampicillin 9  Micafungin 2  Total antibiotic 14    24 Hour events:  Yesterday and overnight notes reviewed and no acute events noted    Subjective:  Seen at bedside and she denied having any nausea, vomiting, chest pain or shortness of breath  Tolerating current medications without issue  Reviewed possible transition to combination oral therapy  Discussed also with case management as some medications may be cost prohibitive for facility  Additionally discussed with clinical pharmacy in terms of options and may need to maintain IV antifungal due to potential cost     Objective:  Vitals:  Temp:  [97 9 °F (36 6 °C)-98 9 °F (37 2 °C)] 97 9 °F (36 6 °C)  HR:  [90-93] 91  Resp:  [18-20] 18  BP: (152-193)/(75-98) 172/88  SpO2:  [95 %-98 %] 96 %  Temp (24hrs), Av 2 °F (36 8 °C), Min:97 9 °F (36 6 °C), Max:98 9 °F (37 2 °C)  Current: Temperature: 97 9 °F (36 6 °C)    Physical Exam:   General Appearance:  Alert, interactive, nontoxic, no acute distress  Chronically ill-appearing  Throat: Oropharynx moist without lesions  Lungs:   Clear to auscultation bilaterally; no wheezes, rhonchi or rales; respirations unlabored on room air   Heart:  RRR; no murmur, rub or gallop appreciated   Abdomen:   Soft, non-tender, non-distended, positive bowel sounds  Extremities: No clubbing, cyanosis or edema   Skin: No new rashes or lesions  No new draining wounds noted  His areas of bruising are healing  Patient still with discomfort upon manipulation of the right stump  Labs, Imaging, & Other studies:   All pertinent labs and imaging studies were personally reviewed as below    Results from last 7 days   Lab Units 23  0612 23  0509 23  0501   WBC Thousand/uL 17 02* 19 72* 23 18*   HEMOGLOBIN g/dL 7 4* 7 1* 7 6*   PLATELETS Thousands/uL 406* 415* 436*     Results from last 7 days   Lab Units 23  0612 23  0501 23  0445 23  1035   POTASSIUM mmol/L 3 1*   < > 3 4* 3 8   CHLORIDE mmol/L 105   < > 104 103   CO2 mmol/L 28   < > 26 26   BUN mg/dL 5   < > 13 12   CREATININE mg/dL 0 39*   < > 0 54* 0 47*   EGFR ml/min/1 73sq m 113   < > 102 106   CALCIUM mg/dL 7 2*   < > 7 2* 7 5*   AST U/L 13  --  15 17 ALT U/L 8  --  10 12   ALK PHOS U/L 164*  --  180* 192*    < > = values in this interval not displayed  Results from last 7 days   Lab Units 01/27/23  1610 01/24/23  1703   GRAM STAIN RESULT  2+ Polys  No bacteria seen No Polys or Bacteria seen  1+ Polys  No bacteria seen   WOUND CULTURE   --  Growth in Broth culture only Candida albicans*  Few Colonies of       Lab interpretation/comments: White blood cell count further downtrending  LFTs and creatinine unremarkable  EKG with QTC of 511 today  Imaging interpretation/comments: No new images    Culture data: Polymicrobial cultures noted from the wound and reviewed with clinical pharmacy

## 2023-02-01 LAB
ABO GROUP BLD BPU: NORMAL
ABO GROUP BLD BPU: NORMAL
ABO GROUP BLD: NORMAL
ALBUMIN SERPL BCP-MCNC: 2.3 G/DL (ref 3.5–5)
ALP SERPL-CCNC: 184 U/L (ref 34–104)
ALT SERPL W P-5'-P-CCNC: 9 U/L (ref 7–52)
ANION GAP SERPL CALCULATED.3IONS-SCNC: 5 MMOL/L (ref 4–13)
AST SERPL W P-5'-P-CCNC: 13 U/L (ref 13–39)
BACTERIA TISS AEROBE CULT: ABNORMAL
BASOPHILS # BLD AUTO: 0.19 THOUSANDS/ÂΜL (ref 0–0.1)
BASOPHILS NFR BLD AUTO: 1 % (ref 0–1)
BILIRUB SERPL-MCNC: 0.28 MG/DL (ref 0.2–1)
BLD GP AB SCN SERPL QL: NEGATIVE
BPU ID: NORMAL
BPU ID: NORMAL
BUN SERPL-MCNC: 6 MG/DL (ref 5–25)
CALCIUM ALBUM COR SERPL-MCNC: 9.1 MG/DL (ref 8.3–10.1)
CALCIUM SERPL-MCNC: 7.7 MG/DL (ref 8.4–10.2)
CHLORIDE SERPL-SCNC: 105 MMOL/L (ref 96–108)
CO2 SERPL-SCNC: 28 MMOL/L (ref 21–32)
CREAT SERPL-MCNC: 0.48 MG/DL (ref 0.6–1.3)
CROSSMATCH: NORMAL
CROSSMATCH: NORMAL
EOSINOPHIL # BLD AUTO: 4.07 THOUSAND/ÂΜL (ref 0–0.61)
EOSINOPHIL NFR BLD AUTO: 23 % (ref 0–6)
ERYTHROCYTE [DISTWIDTH] IN BLOOD BY AUTOMATED COUNT: 16.3 % (ref 11.6–15.1)
FUNGUS SPEC CULT: ABNORMAL
GFR SERPL CREATININE-BSD FRML MDRD: 106 ML/MIN/1.73SQ M
GLUCOSE SERPL-MCNC: 101 MG/DL (ref 65–140)
GLUCOSE SERPL-MCNC: 78 MG/DL (ref 65–140)
GLUCOSE SERPL-MCNC: 81 MG/DL (ref 65–140)
GLUCOSE SERPL-MCNC: 85 MG/DL (ref 65–140)
GLUCOSE SERPL-MCNC: 95 MG/DL (ref 65–140)
GRAM STN SPEC: ABNORMAL
GRAM STN SPEC: ABNORMAL
HCT VFR BLD AUTO: 26 % (ref 34.8–46.1)
HGB BLD-MCNC: 7.9 G/DL (ref 11.5–15.4)
IMM GRANULOCYTES # BLD AUTO: 0.15 THOUSAND/UL (ref 0–0.2)
IMM GRANULOCYTES NFR BLD AUTO: 1 % (ref 0–2)
INR PPP: 2.01 (ref 0.84–1.19)
LYMPHOCYTES # BLD AUTO: 1.67 THOUSANDS/ÂΜL (ref 0.6–4.47)
LYMPHOCYTES NFR BLD AUTO: 9 % (ref 14–44)
MAGNESIUM SERPL-MCNC: 1.7 MG/DL (ref 1.9–2.7)
MCH RBC QN AUTO: 26.8 PG (ref 26.8–34.3)
MCHC RBC AUTO-ENTMCNC: 30.4 G/DL (ref 31.4–37.4)
MCV RBC AUTO: 88 FL (ref 82–98)
MONOCYTES # BLD AUTO: 0.85 THOUSAND/ÂΜL (ref 0.17–1.22)
MONOCYTES NFR BLD AUTO: 5 % (ref 4–12)
NEUTROPHILS # BLD AUTO: 11.08 THOUSANDS/ÂΜL (ref 1.85–7.62)
NEUTS SEG NFR BLD AUTO: 61 % (ref 43–75)
NRBC BLD AUTO-RTO: 0 /100 WBCS
PHOSPHATE SERPL-MCNC: 2.2 MG/DL (ref 2.3–4.1)
PLATELET # BLD AUTO: 452 THOUSANDS/UL (ref 149–390)
PMV BLD AUTO: 8.2 FL (ref 8.9–12.7)
POTASSIUM SERPL-SCNC: 3.8 MMOL/L (ref 3.5–5.3)
PROT SERPL-MCNC: 5.5 G/DL (ref 6.4–8.4)
PROTHROMBIN TIME: 22.7 SECONDS (ref 11.6–14.5)
RBC # BLD AUTO: 2.95 MILLION/UL (ref 3.81–5.12)
RH BLD: NEGATIVE
SODIUM SERPL-SCNC: 138 MMOL/L (ref 135–147)
SPECIMEN EXPIRATION DATE: NORMAL
UNIT DISPENSE STATUS: NORMAL
UNIT DISPENSE STATUS: NORMAL
UNIT PRODUCT CODE: NORMAL
UNIT PRODUCT CODE: NORMAL
UNIT PRODUCT VOLUME: 350 ML
UNIT PRODUCT VOLUME: 350 ML
UNIT RH: NORMAL
UNIT RH: NORMAL
WBC # BLD AUTO: 18.01 THOUSAND/UL (ref 4.31–10.16)

## 2023-02-01 RX ORDER — MAGNESIUM CHLORIDE 64 MG
64 TABLET, DELAYED RELEASE (ENTERIC COATED) ORAL DAILY
Status: DISCONTINUED | OUTPATIENT
Start: 2023-02-01 | End: 2023-02-03 | Stop reason: HOSPADM

## 2023-02-01 RX ADMIN — AMPICILLIN SODIUM 2000 MG: 2 INJECTION, POWDER, FOR SOLUTION INTRAMUSCULAR; INTRAVENOUS at 17:59

## 2023-02-01 RX ADMIN — LISINOPRIL 40 MG: 20 TABLET ORAL at 08:06

## 2023-02-01 RX ADMIN — AMPICILLIN SODIUM 2000 MG: 2 INJECTION, POWDER, FOR SOLUTION INTRAMUSCULAR; INTRAVENOUS at 10:27

## 2023-02-01 RX ADMIN — CLOPIDOGREL BISULFATE 75 MG: 75 TABLET ORAL at 08:06

## 2023-02-01 RX ADMIN — HYDRALAZINE HYDROCHLORIDE 20 MG: 20 INJECTION, SOLUTION INTRAMUSCULAR; INTRAVENOUS at 21:34

## 2023-02-01 RX ADMIN — BUPROPION HYDROCHLORIDE 75 MG: 75 TABLET, FILM COATED ORAL at 08:06

## 2023-02-01 RX ADMIN — HYDRALAZINE HYDROCHLORIDE 50 MG: 25 TABLET, FILM COATED ORAL at 05:06

## 2023-02-01 RX ADMIN — SERTRALINE HYDROCHLORIDE 100 MG: 100 TABLET, FILM COATED ORAL at 08:07

## 2023-02-01 RX ADMIN — DOXYCYCLINE 100 MG: 100 CAPSULE ORAL at 08:07

## 2023-02-01 RX ADMIN — WARFARIN SODIUM 3 MG: 3 TABLET ORAL at 17:58

## 2023-02-01 RX ADMIN — BUPROPION HYDROCHLORIDE 75 MG: 75 TABLET, FILM COATED ORAL at 17:58

## 2023-02-01 RX ADMIN — MICAFUNGIN SODIUM 150 MG: 50 INJECTION, POWDER, LYOPHILIZED, FOR SOLUTION INTRAVENOUS at 20:52

## 2023-02-01 RX ADMIN — DOXYCYCLINE 100 MG: 100 CAPSULE ORAL at 20:51

## 2023-02-01 RX ADMIN — PANTOPRAZOLE SODIUM 40 MG: 40 TABLET, DELAYED RELEASE ORAL at 06:04

## 2023-02-01 RX ADMIN — AMIODARONE HYDROCHLORIDE 400 MG: 200 TABLET ORAL at 08:07

## 2023-02-01 RX ADMIN — HYDRALAZINE HYDROCHLORIDE 20 MG: 20 INJECTION, SOLUTION INTRAMUSCULAR; INTRAVENOUS at 03:26

## 2023-02-01 RX ADMIN — OXYCODONE 5 MG: 5 TABLET ORAL at 04:54

## 2023-02-01 RX ADMIN — OXYCODONE 5 MG: 5 TABLET ORAL at 10:26

## 2023-02-01 RX ADMIN — MAGNESIUM 64 MG (MAGNESIUM CHLORIDE) TABLET,DELAYED RELEASE 64 MG: at 10:27

## 2023-02-01 RX ADMIN — AMPICILLIN SODIUM 2000 MG: 2 INJECTION, POWDER, FOR SOLUTION INTRAMUSCULAR; INTRAVENOUS at 02:30

## 2023-02-01 RX ADMIN — OXYCODONE 5 MG: 5 TABLET ORAL at 21:34

## 2023-02-01 RX ADMIN — METOPROLOL TARTRATE 25 MG: 25 TABLET, FILM COATED ORAL at 20:51

## 2023-02-01 RX ADMIN — AMLODIPINE BESYLATE 10 MG: 10 TABLET ORAL at 08:06

## 2023-02-01 RX ADMIN — POTASSIUM & SODIUM PHOSPHATES POWDER PACK 280-160-250 MG 1 PACKET: 280-160-250 PACK at 10:27

## 2023-02-01 RX ADMIN — POTASSIUM & SODIUM PHOSPHATES POWDER PACK 280-160-250 MG 1 PACKET: 280-160-250 PACK at 12:53

## 2023-02-01 RX ADMIN — AMPICILLIN SODIUM 2000 MG: 2 INJECTION, POWDER, FOR SOLUTION INTRAMUSCULAR; INTRAVENOUS at 15:22

## 2023-02-01 RX ADMIN — INSULIN GLARGINE 20 UNITS: 100 INJECTION, SOLUTION SUBCUTANEOUS at 08:08

## 2023-02-01 RX ADMIN — AMPICILLIN SODIUM 2000 MG: 2 INJECTION, POWDER, FOR SOLUTION INTRAMUSCULAR; INTRAVENOUS at 22:42

## 2023-02-01 RX ADMIN — AMIODARONE HYDROCHLORIDE 400 MG: 200 TABLET ORAL at 17:58

## 2023-02-01 RX ADMIN — THIAMINE HCL TAB 100 MG 100 MG: 100 TAB at 08:07

## 2023-02-01 RX ADMIN — METOPROLOL TARTRATE 25 MG: 25 TABLET, FILM COATED ORAL at 08:07

## 2023-02-01 RX ADMIN — AMPICILLIN SODIUM 2000 MG: 2 INJECTION, POWDER, FOR SOLUTION INTRAMUSCULAR; INTRAVENOUS at 06:04

## 2023-02-01 RX ADMIN — HYDRALAZINE HYDROCHLORIDE 50 MG: 25 TABLET, FILM COATED ORAL at 15:17

## 2023-02-01 RX ADMIN — SENNOSIDES 8.6 MG: 8.6 TABLET ORAL at 21:34

## 2023-02-01 RX ADMIN — OXYCODONE 5 MG: 5 TABLET ORAL at 18:02

## 2023-02-01 RX ADMIN — HYDRALAZINE HYDROCHLORIDE 50 MG: 25 TABLET, FILM COATED ORAL at 21:34

## 2023-02-01 NOTE — ASSESSMENT & PLAN NOTE
Lab Results   Component Value Date    HGBA1C 9 8 (H) 10/25/2022       Recent Labs     01/31/23  1552 01/31/23  2128 02/01/23  0010 02/01/23  0602   POCGLU 98 108 78 85       Blood Sugar Average: Last 72 hrs:  (P) 105     Home regimen: Lantus 60U HS, Novolog 2oU TID prior to meals  Inpatient regimen: Lantus 20UHS, sliding scale

## 2023-02-01 NOTE — ASSESSMENT & PLAN NOTE
Replete PRN    Recent Labs     01/30/23  0509 01/31/23  0612 02/01/23  0509   K 3 4* 3 1* 3 8   MG 1 8* 1 8* 1 7*

## 2023-02-01 NOTE — ASSESSMENT & PLAN NOTE
Patient currently on amlodipine, hydralazine, and lisinopril  Blood pressure noted to be highly elevated; increased hydralazine to 50 mg 3 times daily on 1/31  Overall blood pressure much better controlled

## 2023-02-01 NOTE — PROGRESS NOTES
[facility]  Progress Note - [address] 1960, 65 y o  female MRN: [id number]  Unit/Bed#: [place] [address] -01 Encounter: [id number]  Primary Care Provider: ALEX Carreno   Date and time admitted to hospital: 1/17/2023  3:48 PM    Osteomyelitis [facility])  Assessment & Plan  S/p right - amputation revision stump    Bacteremia  Assessment & Plan  Polymicrobial bacteremia (Enterobacter / Enterococcus)  · Antibiotic management per infectious disease  Currently on meropenem / Ampicillin  · Last blood cultures negative on 1/21  · To transition to Augmentin and doxycycline on 2/2; continue antibiotic treatment through 3/1    Hypomagnesemia  Assessment & Plan  Replete prn    Cardiac arrest [facility])  Assessment & Plan  Cardiac arrest (1/18/23) presumably secondary to electrolyte abnormalities and/or severe sepsis  · Cardiology recommendations appreciated  · Continue with amiodarone, Norvasc, hydralazine, lisinopril, metoprolol  · Of note, she is on twice daily amiodarone  Will defer to cardiology when it would be appropriate to adjust her dose  Fall  Assessment & Plan  Presents from facility with altered mental status, status post fall  Trauma scans negative     History of CVA (cerebrovascular accident)  Assessment & Plan  Patient had stroke on 11/14 right hemispheric, with residual left-sided weakness  HIT (heparin-induced thrombocytopenia)  Assessment & Plan  History of HIT   On warfarin  Goal INR 2-3    Decreased dose back to 3mg from 5mg previously, monitor and increase as needed    Recent Labs     01/30/23  0509 01/31/23  0612 02/01/23  0509   INR 2 21* 2 32* 2 01*         Anemia  Assessment & Plan  Hemoglobin 7 9 on a m  labs; up from 7 4 yesterday  Monitor and transfuse for hemoglobin of 7 or less    PAD (peripheral artery disease) ([facility] )  Assessment & Plan  S/p L AKA 11/23/22   S/p R AKA, wound debridement 12/1/22   S/p R AKA wound/stump washout and VAC placement 12/16/22 S/p R AKA wound/stump VAC change, L AKA stump washout/packing 12/19/22   Wound Cx 12/19/22: enterobacter cloacae     During this admission  · S/p Right - AMPUTATION REVISION STUMP 1/27/2023            Hypokalemia  Assessment & Plan  Replete PRN    Recent Labs     01/30/23  0509 01/31/23  0612 02/01/23  0509   K 3 4* 3 1* 3 8   MG 1 8* 1 8* 1 7*         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 [facility])  Assessment & Plan  Lab Results   Component Value Date    HGBA1C 9 8 (H) 10/25/2022       Recent Labs     01/31/23  1552 01/31/23  2128 02/01/23  0010 02/01/23  0602   POCGLU 98 108 78 85       Blood Sugar Average: Last 72 hrs:  (P) 105     Home regimen: Lantus 60U HS, Novolog 2oU TID prior to meals  Inpatient regimen: Lantus 20UHS, sliding scale  Moderate protein-calorie malnutrition ([facility] )  Assessment & Plan  Malnutrition Findings:                                 BMI Findings: Body mass index is 27 29 kg/m²  Nutrition and speech therapy consult    Hypertension  Assessment & Plan  Patient currently on amlodipine, hydralazine, and lisinopril  Blood pressure noted to be highly elevated; increased hydralazine to 50 mg 3 times daily on 1/31  Overall blood pressure much better controlled    Esophageal reflux  Assessment & Plan  Continue PPI     * Sepsis due to urinary tract infection ([facility] )  Assessment & Plan  She presented with altered mental status, generalized weakness, and fall secondary to sepsis  Etiology secondary to complicated UTI , polymicrobial bacteremia, and 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 further worsened by her cardiac arrest  She is S/p right amputation revision stump  · Antibiotic management per infectious disease  Currently on Meropenem and ampicillin for osteo through 3/1      Hyponatremia-resolved as of 1/23/2023  Assessment & Plan  Likely hypovolemic due to sepsis  Recheck BMP in the morning        VTE Pharmacologic Prophylaxis:   Pharmacologic: Warfarin (Coumadin)  Mechanical VTE Prophylaxis in Place: No    Patient Centered Rounds: I have performed bedside rounds with nursing staff today  Discussions with Specialists or Other Care Team Provider: Infectious disease    Education and Discussions with Family / Patient: Discussed treatment plan with family and patient who agree with current plan; encouraged to ask questions and participate  Time Spent for Care: 45 minutes  More than 50% of total time spent on counseling and coordination of care as described above  Current Length of Stay: 15 day(s)    Current Patient Status: Inpatient   Certification Statement: The patient will continue to require additional inpatient hospital stay due to Treatment of osteomyelitis    Discharge Plan: To be determined, likely 24 to 48 hours    Code Status: Level 3 - DNAR and DNI      Subjective:   Patient seen and examined bedside, overall, appears much improved from a mentation perspective on today's examination  Alert oriented X3 and able to answer all examiner questions appropriately  Patient to be transition to p o  antibiotics tomorrow as per ID  Will need 6 weeks total antibiotic therapy for osteomyelitis (likely through 3/1/2023) and 6 months antifungal therapy as yeast found in wound  PICC line in place and phosphorus and magnesium repleted today  Monitor on labs tomorrow morning and discussed with case management  Patient on 15-day bed hold which is being discussed with her rehab facility  Likely stable for discharge tomorrow pending repeat labs; anticipate return to rehab facility  Objective:     Vitals:   Temp (24hrs), Av 3 °F (36 8 °C), Min:97 8 °F (36 6 °C), Max:99 °F (37 2 °C)    Temp:  [97 8 °F (36 6 °C)-99 °F (37 2 °C)] 98 2 °F (36 8 °C)  HR:  [81-88] 88  Resp:  [15-20] 18  BP: (120-199)/(47-95) 120/51  SpO2:  [94 %-98 %] 98 %  Body mass index is 27 29 kg/m²       Input and Output Summary (last 24 hours): Intake/Output Summary (Last 24 hours) at 2/1/2023 1644  Last data filed at 2/1/2023 1628  Gross per 24 hour   Intake --   Output 1 ml   Net -1 ml       Physical Exam:     Physical Exam  Vitals and nursing note reviewed  Constitutional:       General: She is not in acute distress  Appearance: She is well-developed  HENT:      Head: Normocephalic and atraumatic  Eyes:      Conjunctiva/sclera: Conjunctivae normal    Cardiovascular:      Rate and Rhythm: Normal rate and regular rhythm  Heart sounds: No murmur heard  Pulmonary:      Effort: Pulmonary effort is normal  No respiratory distress  Breath sounds: Normal breath sounds  Abdominal:      Palpations: Abdomen is soft  Tenderness: There is no abdominal tenderness  Musculoskeletal:         General: Tenderness, deformity and signs of injury present  No swelling  Cervical back: Neck supple  Comments: Bilateral AKA   Skin:     General: Skin is warm and dry  Findings: Erythema and rash present  Neurological:      Mental Status: She is alert     Psychiatric:         Mood and Affect: Mood normal            Additional Data:     Labs:    Results from last 7 days   Lab Units 02/01/23  0509   WBC Thousand/uL 18 01*   HEMOGLOBIN g/dL 7 9*   HEMATOCRIT % 26 0*   PLATELETS Thousands/uL 452*   NEUTROS PCT % 61   LYMPHS PCT % 9*   MONOS PCT % 5   EOS PCT % 23*     Results from last 7 days   Lab Units 02/01/23  0509   SODIUM mmol/L 138   POTASSIUM mmol/L 3 8   CHLORIDE mmol/L 105   CO2 mmol/L 28   BUN mg/dL 6   CREATININE mg/dL 0 48*   ANION GAP mmol/L 5   CALCIUM mg/dL 7 7*   ALBUMIN g/dL 2 3*   TOTAL BILIRUBIN mg/dL 0 28   ALK PHOS U/L 184*   ALT U/L 9   AST U/L 13   GLUCOSE RANDOM mg/dL 81     Results from last 7 days   Lab Units 02/01/23  0509   INR  2 01*     Results from last 7 days   Lab Units 02/01/23  1615 02/01/23  1123 02/01/23  0602 02/01/23  0010 01/31/23  2128 01/31/23  1552 01/31/23  1115 01/31/23  0549 01/31/23  0059 01/30/23  2056 01/30/23  1625 01/30/23  1129   POC GLUCOSE mg/dl 95 101 85 78 108 98 80 94 86 122 142* 103                   * I Have Reviewed All Lab Data Listed Above  * Additional Pertinent Lab Tests Reviewed:  All Labs Within Last 24 Hours Reviewed    Imaging:    Imaging Reports Reviewed Today Include:   Imaging Personally Reviewed by Myself Includes:      Recent Cultures (last 7 days):     Results from last 7 days   Lab Units 01/27/23  1610   GRAM STAIN RESULT  2+ Polys  No bacteria seen       Last 24 Hours Medication List:   Current Facility-Administered Medications   Medication Dose Route Frequency Provider Last Rate   • acetaminophen  650 mg Oral Q6H PRN Irma Mata MD     • amiodarone  400 mg Oral BID With Meals Magaly Reynolds MD     • amLODIPine  10 mg Oral Daily Magaly Reynolds MD     • [START ON 2/2/2023] amoxicillin-clavulanate  1 tablet Oral Q12H [address] Jennie Kaye MD     • ampicillin  2,000 mg Intravenous Q4H Jennie Kaye MD 2,000 mg (02/01/23 1522)   • bisacodyl  10 mg Rectal Daily PRN Magaly Reynolds MD     • buPROPion  75 mg Per NG Tube BID Magaly Reynolds MD     • clopidogrel  75 mg Oral Daily Magaly Reynolds MD     • doxycycline hyclate  100 mg Oral Q12H [address] Jennie Kaye MD     • hydrALAZINE  20 mg Intravenous Q4H PRN Magaly Reynolds MD     • hydrALAZINE  50 mg Oral [facility] Jean Claude Hyde MD     • HYDROmorphone  0 2 mg Intravenous Q4H PRN Magaly Reynolds MD     • insulin glargine  20 Units Subcutaneous QAM Magaly Reynolds MD     • insulin lispro  2-12 Units Subcutaneous Q6H [address] Magaly Reynolds MD     • lisinopril  40 mg Oral Daily Magaly Reynolds MD     • magnesium chloride  64 mg Oral Daily Jean Claude Hyde MD     • metoprolol tartrate  25 mg Oral Q12H [address] Magaly Reynolds MD     • micafungin  150 mg Intravenous Q24H Jennie Kaye  mg (01/31/23 2019)   • oxyCODONE  5 mg Oral Q4H PRN Irma Mata MD     • oxyCODONE  5 mg Oral Q6H Irma Mata MD     • pantoprazole  40 mg Oral Early Morning Katiuska Burton MD     • senna  1 tablet Oral HS Nicolasa Rick MD     • sertraline  100 mg Oral Daily Nicolasa Rick MD     • thiamine  100 mg Oral Daily Nicolasa Rick MD     • warfarin  3 mg Oral Daily (warfarin) Katiuska Burton MD          Today, Patient Was Seen By: Gisell Khan MD    ** Please Note: Dictation voice to text software may have been used in the creation of this document   **

## 2023-02-01 NOTE — ASSESSMENT & PLAN NOTE
Hemoglobin 7 9 on a m  labs; up from 7 4 yesterday  Monitor and transfuse for hemoglobin of 7 or less

## 2023-02-01 NOTE — CASE MANAGEMENT
Case Management Discharge Planning Note    Patient name Agata Gayle  Location [facility] Krupa Wallace* MRN [id number]  : 1960 Date 2023       Current Admission Date: 2023  Current Admission Diagnosis:Sepsis due to urinary tract infection [facility])   Patient Active Problem List    Diagnosis Date Noted   • Osteomyelitis ([address] ) 2023   • Altered mental status 2023   • Forehead laceration 2023   • Bacteremia 2023   • Cardiac arrest ([address] ) 2023   • Troponin level elevated 2023   • Hypomagnesemia 2023   • Sepsis due to urinary tract infection ([address] ) 2023   • Fall 2023   • Mild protein-calorie malnutrition ([address] ) 2022   • Diarrhea 2022   • CVA (cerebral vascular accident) ([address] ) 2022   • HIT (heparin-induced thrombocytopenia) 2022   • Diabetic ulcer of heel associated with diabetes mellitus due to underlying condition ([address] ) 11/10/2022   • Anemia 10/30/2022   • Generalized edema due to fluid overload 10/27/2022   • PAD (peripheral artery disease) ([address] ) 10/25/2022   • Non healing left heel wound 10/22/2022   • Hypertensive urgency 10/22/2022   • Hypokalemia 10/22/2022   • Wound of lower extremity 10/21/2022   • Epigastric pain 2022   • Type 2 diabetes mellitus with hyperglycemia, with long-term current use of insulin ([address] ) 10/18/2021   • Hyperparathyroidism ([address] ) 10/18/2021   • 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 ([address] ) 2021   • 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   • Moderate protein-calorie malnutrition ([address] ) 2020   • Acute colitis 2020   • Arthritis 2019   • Depression, recurrent ([address] ) 2018   • Class 3 severe obesity due to excess calories with serious comorbidity and body mass index (BMI) of 40 0 to 44 9 in adult ([facility] ) 2018   • Esophageal reflux 2018   • DM (diabetes mellitus) type II, controlled, with peripheral vascular disorder ([facility] ) 02/23/2018   • Diabetic peripheral neuropathy ([place] [address] ) 97/33/8242   • Systolic murmur 77/06/3370   • Stroke ([facility] ) 12/14/2015   • History of CVA (cerebrovascular accident) 12/14/2015   • Anxiety 03/19/2015   • Vitamin D deficiency 03/19/2015   • Hypertension 08/28/2012   • Familial combined hyperlipidemia 08/28/2012      LOS (days): 15  Geometric Mean LOS (GMLOS) (days): 9 60  Days to GMLOS:-5 2     OBJECTIVE:  Risk of Unplanned Readmission Score: 46 09         Current admission status: Inpatient   Preferred Pharmacy:   [id number] [address]  [place] [address]  Phone: [phone] Fax: [phone]    Primary Care Provider: ALEX Latham    Primary Insurance: [address] [place]  Secondary Insurance:     DISCHARGE DETAILS:                                                                                                 Additional Comments: Per discussion with SLIM, EUGENIO for pt is tomorrow or Friday  CHIRAG was sent to [facility] via [employer]

## 2023-02-01 NOTE — ASSESSMENT & PLAN NOTE
She presented with altered mental status, generalized weakness, and fall secondary to sepsis  Etiology secondary to complicated UTI , polymicrobial bacteremia, and 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 further worsened by her cardiac arrest  She is S/p right amputation revision stump  · Antibiotic management per infectious disease  Currently on Meropenem and ampicillin for osteo through 3/1

## 2023-02-01 NOTE — ASSESSMENT & PLAN NOTE
Polymicrobial bacteremia (Enterobacter / Enterococcus)  · Antibiotic management per infectious disease  Currently on meropenem / Ampicillin    · Last blood cultures negative on 1/21  · To transition to Augmentin and doxycycline on 2/2; continue antibiotic treatment through 3/1

## 2023-02-01 NOTE — ASSESSMENT & PLAN NOTE
History of HIT   On warfarin  Goal INR 2-3    Decreased dose back to 3mg from 5mg previously, monitor and increase as needed    Recent Labs     01/30/23  0509 01/31/23  0612 02/01/23  0509   INR 2 21* 2 32* 2 01*

## 2023-02-01 NOTE — PROGRESS NOTES
Progress Note - Infectious Disease   Mitchel Bocanegra 65 y o  female MRN: [id number]  Unit/Bed#: [place] [address] -01 Encounter: [id number]      Impression/Plan:    1   Sepsis, POA   Patient met sepsis criteria on presentation   Sources are 2 and 3  Course complicated by 4   Extensive resistance developing on prior cultures   Clinical parameters overall improved except for leukocytosis   Ongoing white count likely related to the right stump, now downtrending post interventions   -Antibiotics and antifungal plan as below  -continue to trend fevers, vitals, WBC count  -Maintain current PICC line  -Additional supportive care as per primary  -Duration of therapy as below     2   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   Blood cultures have isolated Enterobacter and Enterococcus faecalis  Enterobacter resistant to Ertapenem but no carbapenemase present on confirmatory testing   Bacteremia likely due to infection of the right AKA stump given prior wound cultures and now findings on CT imaging with contrast  2D echo limited  No other obvious ectopic focus of infection on extensive imaging   Repeat cultures cleared rapidly   Wound cultures from 1/24 with a few colonies of staph likely colonizing the open wound and yeast only in broth culture likely not significant   Transesophageal echo completed on 1/27 which was without vegetation   Underwent debridement of the right stump with vascular on 1/27   OR cultures now showing candida albicans, Enterococcus and Enterobacter  Plan for combination oral and IV therapy  EKG with prolonged QTC limiting antifungal options   Completed 7 days meropenem for Enterobacter bacteremia and now transition to oral doxycycline  -continue PO doxycycline 100 every 12 hours for Enterobacter isolated in bone  -Continue ampicillin IV through 2/1 for enterococcal bacteremia  -tomorrow plan to transition to Augmentin 875 every 12 hours for Enterococcus in bone  -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 mg every 24 for fungal osteomyelitis  -Patient's only oral antifungal option is Cresemba, likely cost prohibitive for facility  -Case management to reach out to facility  -Continue to trend fever curve/vitals  -Repeat CBC and CMP tomorrow  -Plan for 6 weeks of antibiotic coverage for bone and joint infection through 3/9/23  -Plan for total 6 months of antifungal coverage for bone and joint infection through 23  -Maintain PICC line for now  -We will arrange ID follow-up closer to discharge     3   Complicated urinary tract infection   Patient unable to provide any history  Karine Flowers was grossly abnormal on presentation   Possibly contributed to presentation   Higher suspicion for wound infection as above   Course of therapy completed for this issue   -Continue antibiotic as above  -Monitor abdominal exam  -Catheters as per primary     4   Cardiac arrest   Suspect that this may have been unrelated to the above   Patient with multiple other comorbidities as well   Ongoing care and work-up as per critical care/cardiology  Patient extubated 2023     5   Transaminitis   Acute elevation noted likely in the setting of 4   We will need to monitor LFTs however on antibiotics above  Currently stable  -Antibiotic as above  -Repeat LFTs tomorrow  -Monitor abdominal exam     Above plan discussed in detail with patient at bedside  ID will follow  Antibiotics:  Ampicillin 10  Micafungin 3  Doxycycline 2  Total antibiotic 15    Subjective:  Patient is feeling okay today  Reports some phantom pain in the right leg  She denies fevers or chills  Tolerating antibiotics well without nausea, vomiting, diarrhea      Objective:  Vitals:  Temp:  [97 8 °F (36 6 °C)-99 °F (37 2 °C)] 98 2 °F (36 8 °C)  HR:  [81-88] 88  Resp:  [15-20] 18  BP: (120-199)/(47-95) 120/51  SpO2:  [94 %-98 %] 98 %  Temp (24hrs), Av 3 °F (36 8 °C), Min:97 8 °F (36 6 °C), Max:99 °F (37 2 °C)  Current: Temperature: 98 2 °F (36 8 °C)    Physical Exam:   General Appearance:  Chronically ill appearing but interactive, nontoxic, no acute distress  Throat: Oropharynx moist without lesions  Lungs:   Clear to auscultation bilaterally; no wheezes, rhonchi or rales; respirations unlabored   Heart:  RRR; no murmur, rub or gallop   Abdomen:   Soft, non-tender, non-distended, positive bowel sounds  Extremities: No clubbing, cyanosis or edema   Skin: No new rashes or lesions  No new draining wounds noted  Areas of bruising are healing  Right stump with dressing in place       Labs: All pertinent labs and imaging studies were personally reviewed  Results from last 7 days   Lab Units 02/01/23  0509 01/31/23  0612 01/30/23  0509   WBC Thousand/uL 18 01* 17 02* 19 72*   HEMOGLOBIN g/dL 7 9* 7 4* 7 1*   PLATELETS Thousands/uL 452* 406* 415*     Results from last 7 days   Lab Units 02/01/23  0509 01/31/23  0612 01/30/23  0509 01/29/23  0501 01/28/23  0445   SODIUM mmol/L 138 139 138   < > 137   POTASSIUM mmol/L 3 8 3 1* 3 4*   < > 3 4*   CHLORIDE mmol/L 105 105 106   < > 104   CO2 mmol/L 28 28 25   < > 26   BUN mg/dL 6 5 7   < > 13   CREATININE mg/dL 0 48* 0 39* 0 48*   < > 0 54*   EGFR ml/min/1 73sq m 106 113 106   < > 102   CALCIUM mg/dL 7 7* 7 2* 7 0*   < > 7 2*   AST U/L 13 13  --   --  15   ALT U/L 9 8  --   --  10   ALK PHOS U/L 184* 164*  --   --  180*    < > = values in this interval not displayed                         Micro:  Results from last 7 days   Lab Units 01/27/23  1610   GRAM STAIN RESULT  2+ Polys  No bacteria seen     No new imaging available

## 2023-02-01 NOTE — WOUND OSTOMY CARE
Progress Note - Wound   Algie Limbo 65 y o  female MRN: [id number]  Unit/Bed#: [place] -01 Encounter: [id number]        Assessment:   Seen today for wound care follow up   Pt supine in bed on a low air loss mattress  Pt has been medicated prior to treatment  She is very tearful and reluctant to dressing change  Ace wrap removed to assess right lateral thigh wound  ABD and ace 50% serosangenous drainage  NO malodor   1)Right lateral thigh 95% pink tissue  Partial thickness skin loss , no drainage noted on dressing  2)Right AKA incision clean and dry with staples intact  3)Iodoform gauze packing present in distal end of residual limb s/p I and D by vascular surgery  ABD applied and clean ace wrap  4)Patient refused several attempts to assess sacrum and buttocks  No induration, fluctuance, odor, warmth/temperature differences, redness, or purulence noted to the above noted wounds and skin areas assessed  New dressings applied per orders listed below  Bedside nurse aware of plan of care  See flow sheets for more detailed assessment findings  Wound care will continue to follow  Wound Care Plan:   1-Right anterior thigh per vascular surgery status post I & D   2-Low air-loss mattress  3-Offloading air cushion in chair if/when out of bed  4-Moisturize skin daily with skin nourishing cream   5-Turn/reposition every 2 hours while in bed using positioning wedges; and weight shift frequently while in chair for pressure re-distribution on skin    6-Buttocks/sacrum--cleanse with soap and water, pat dry  Apply Calazime paste three times daily and as needed with incontinence care  7-Right stump incision and lateral thigh wound--cleanse with normal saline, pat dry   Apply Dermagran (cut to fit size of wound)   Cover with Allevyn Life foam dressings   Change dressings every other day      Wound care team to follow  Shan Fair of devyn reviewed with primary RN      Wound 01/18/23 Thigh Right;Lateral (Active)   Wound Image   02/01/23 1140   Wound Description Nimrod 02/01/23 1140   Michaela-wound Assessment Erythema 02/01/23 1140   Wound Length (cm) 1 5 cm 02/01/23 1140   Wound Width (cm) 0 7 cm 02/01/23 1140   Wound Depth (cm) 0 1 cm 02/01/23 1140   Wound Surface Area (cm^2) 1 05 cm^2 02/01/23 1140   Wound Volume (cm^3) 0 105 cm^3 02/01/23 1140   Calculated Wound Volume (cm^3) 0 11 cm^3 02/01/23 1140   Change in Wound Size % 45 02/01/23 1140   Drainage Amount None 02/01/23 1140   Non-staged Wound Description Partial thickness 02/01/23 1140   Treatments Site care 02/01/23 1140   Dressing Xeroform 02/01/23 1140   Dressing Changed Changed 02/01/23 1140   Patient Tolerance Tolerated well 02/01/23 1140   Dressing Status Clean; Intact;Dry 02/01/23 1140             Call or tigertext with any questions  Wound Care will continue to follow    Margaret SAMSONN RN

## 2023-02-02 LAB
ALBUMIN SERPL BCP-MCNC: 2.3 G/DL (ref 3.5–5)
ALP SERPL-CCNC: 191 U/L (ref 34–104)
ALT SERPL W P-5'-P-CCNC: 9 U/L (ref 7–52)
ANION GAP SERPL CALCULATED.3IONS-SCNC: 6 MMOL/L (ref 4–13)
AST SERPL W P-5'-P-CCNC: 13 U/L (ref 13–39)
BASOPHILS # BLD AUTO: 0.16 THOUSANDS/ÂΜL (ref 0–0.1)
BASOPHILS NFR BLD AUTO: 1 % (ref 0–1)
BILIRUB SERPL-MCNC: 0.28 MG/DL (ref 0.2–1)
BUN SERPL-MCNC: 7 MG/DL (ref 5–25)
CALCIUM ALBUM COR SERPL-MCNC: 9.3 MG/DL (ref 8.3–10.1)
CALCIUM SERPL-MCNC: 7.9 MG/DL (ref 8.4–10.2)
CHLORIDE SERPL-SCNC: 104 MMOL/L (ref 96–108)
CO2 SERPL-SCNC: 27 MMOL/L (ref 21–32)
CREAT SERPL-MCNC: 0.5 MG/DL (ref 0.6–1.3)
EOSINOPHIL # BLD AUTO: 3.65 THOUSAND/ÂΜL (ref 0–0.61)
EOSINOPHIL NFR BLD AUTO: 23 % (ref 0–6)
ERYTHROCYTE [DISTWIDTH] IN BLOOD BY AUTOMATED COUNT: 16.7 % (ref 11.6–15.1)
GFR SERPL CREATININE-BSD FRML MDRD: 104 ML/MIN/1.73SQ M
GLUCOSE SERPL-MCNC: 102 MG/DL (ref 65–140)
GLUCOSE SERPL-MCNC: 126 MG/DL (ref 65–140)
GLUCOSE SERPL-MCNC: 134 MG/DL (ref 65–140)
GLUCOSE SERPL-MCNC: 163 MG/DL (ref 65–140)
GLUCOSE SERPL-MCNC: 66 MG/DL (ref 65–140)
GLUCOSE SERPL-MCNC: 76 MG/DL (ref 65–140)
GLUCOSE SERPL-MCNC: 81 MG/DL (ref 65–140)
HCT VFR BLD AUTO: 26.5 % (ref 34.8–46.1)
HGB BLD-MCNC: 8.2 G/DL (ref 11.5–15.4)
IMM GRANULOCYTES # BLD AUTO: 0.14 THOUSAND/UL (ref 0–0.2)
IMM GRANULOCYTES NFR BLD AUTO: 1 % (ref 0–2)
LYMPHOCYTES # BLD AUTO: 2.04 THOUSANDS/ÂΜL (ref 0.6–4.47)
LYMPHOCYTES NFR BLD AUTO: 13 % (ref 14–44)
MAGNESIUM SERPL-MCNC: 1.6 MG/DL (ref 1.9–2.7)
MCH RBC QN AUTO: 27.3 PG (ref 26.8–34.3)
MCHC RBC AUTO-ENTMCNC: 30.9 G/DL (ref 31.4–37.4)
MCV RBC AUTO: 88 FL (ref 82–98)
MONOCYTES # BLD AUTO: 0.76 THOUSAND/ÂΜL (ref 0.17–1.22)
MONOCYTES NFR BLD AUTO: 5 % (ref 4–12)
NEUTROPHILS # BLD AUTO: 8.85 THOUSANDS/ÂΜL (ref 1.85–7.62)
NEUTS SEG NFR BLD AUTO: 57 % (ref 43–75)
NRBC BLD AUTO-RTO: 0 /100 WBCS
PHOSPHATE SERPL-MCNC: 3.4 MG/DL (ref 2.3–4.1)
PLATELET # BLD AUTO: 401 THOUSANDS/UL (ref 149–390)
PMV BLD AUTO: 8 FL (ref 8.9–12.7)
POTASSIUM SERPL-SCNC: 3.7 MMOL/L (ref 3.5–5.3)
PROT SERPL-MCNC: 5.5 G/DL (ref 6.4–8.4)
RBC # BLD AUTO: 3 MILLION/UL (ref 3.81–5.12)
SARS-COV-2 RNA RESP QL NAA+PROBE: NEGATIVE
SODIUM SERPL-SCNC: 137 MMOL/L (ref 135–147)
WBC # BLD AUTO: 15.6 THOUSAND/UL (ref 4.31–10.16)

## 2023-02-02 RX ORDER — METOPROLOL TARTRATE 50 MG/1
50 TABLET, FILM COATED ORAL EVERY 12 HOURS SCHEDULED
Status: DISCONTINUED | OUTPATIENT
Start: 2023-02-02 | End: 2023-02-03 | Stop reason: HOSPADM

## 2023-02-02 RX ORDER — MAGNESIUM SULFATE HEPTAHYDRATE 40 MG/ML
2 INJECTION, SOLUTION INTRAVENOUS ONCE
Status: COMPLETED | OUTPATIENT
Start: 2023-02-02 | End: 2023-02-02

## 2023-02-02 RX ORDER — METOPROLOL SUCCINATE 25 MG/1
25 TABLET, EXTENDED RELEASE ORAL ONCE
Status: DISCONTINUED | OUTPATIENT
Start: 2023-02-02 | End: 2023-02-02

## 2023-02-02 RX ORDER — BUPROPION HYDROCHLORIDE 75 MG/1
75 TABLET ORAL ONCE
Status: COMPLETED | OUTPATIENT
Start: 2023-02-02 | End: 2023-02-02

## 2023-02-02 RX ORDER — HYDROCHLOROTHIAZIDE 12.5 MG/1
12.5 TABLET ORAL DAILY
Status: DISCONTINUED | OUTPATIENT
Start: 2023-02-02 | End: 2023-02-03 | Stop reason: HOSPADM

## 2023-02-02 RX ORDER — INSULIN LISPRO 100 [IU]/ML
2-12 INJECTION, SOLUTION INTRAVENOUS; SUBCUTANEOUS
Status: DISCONTINUED | OUTPATIENT
Start: 2023-02-02 | End: 2023-02-03 | Stop reason: HOSPADM

## 2023-02-02 RX ORDER — HYDRALAZINE HYDROCHLORIDE 50 MG/1
100 TABLET, FILM COATED ORAL EVERY 8 HOURS SCHEDULED
Status: DISCONTINUED | OUTPATIENT
Start: 2023-02-02 | End: 2023-02-03 | Stop reason: HOSPADM

## 2023-02-02 RX ORDER — BUPROPION HYDROCHLORIDE 150 MG/1
150 TABLET ORAL DAILY
Status: DISCONTINUED | OUTPATIENT
Start: 2023-02-02 | End: 2023-02-03 | Stop reason: HOSPADM

## 2023-02-02 RX ORDER — BUPROPION HYDROCHLORIDE 75 MG/1
75 TABLET ORAL 2 TIMES DAILY
Status: DISCONTINUED | OUTPATIENT
Start: 2023-02-02 | End: 2023-02-02

## 2023-02-02 RX ADMIN — THIAMINE HCL TAB 100 MG 100 MG: 100 TAB at 08:11

## 2023-02-02 RX ADMIN — HYDRALAZINE HYDROCHLORIDE 50 MG: 25 TABLET, FILM COATED ORAL at 05:00

## 2023-02-02 RX ADMIN — SENNOSIDES 8.6 MG: 8.6 TABLET ORAL at 22:31

## 2023-02-02 RX ADMIN — DOXYCYCLINE 100 MG: 100 CAPSULE ORAL at 08:11

## 2023-02-02 RX ADMIN — AMOXICILLIN AND CLAVULANATE POTASSIUM 1 TABLET: 875; 125 TABLET, FILM COATED ORAL at 20:04

## 2023-02-02 RX ADMIN — SERTRALINE HYDROCHLORIDE 100 MG: 100 TABLET, FILM COATED ORAL at 08:11

## 2023-02-02 RX ADMIN — METOPROLOL TARTRATE 25 MG: 25 TABLET, FILM COATED ORAL at 10:20

## 2023-02-02 RX ADMIN — OXYCODONE 5 MG: 5 TABLET ORAL at 10:21

## 2023-02-02 RX ADMIN — AMIODARONE HYDROCHLORIDE 400 MG: 200 TABLET ORAL at 17:03

## 2023-02-02 RX ADMIN — WARFARIN SODIUM 3 MG: 3 TABLET ORAL at 17:03

## 2023-02-02 RX ADMIN — INSULIN LISPRO 2 UNITS: 100 INJECTION, SOLUTION INTRAVENOUS; SUBCUTANEOUS at 22:32

## 2023-02-02 RX ADMIN — AMIODARONE HYDROCHLORIDE 400 MG: 200 TABLET ORAL at 08:11

## 2023-02-02 RX ADMIN — HYDRALAZINE HYDROCHLORIDE 50 MG: 25 TABLET, FILM COATED ORAL at 14:11

## 2023-02-02 RX ADMIN — OXYCODONE 5 MG: 5 TABLET ORAL at 05:00

## 2023-02-02 RX ADMIN — DOXYCYCLINE 100 MG: 100 CAPSULE ORAL at 20:04

## 2023-02-02 RX ADMIN — HYDROCHLOROTHIAZIDE 12.5 MG: 12.5 TABLET ORAL at 10:21

## 2023-02-02 RX ADMIN — METOPROLOL TARTRATE 50 MG: 50 TABLET, FILM COATED ORAL at 20:04

## 2023-02-02 RX ADMIN — HYDRALAZINE HYDROCHLORIDE 20 MG: 20 INJECTION, SOLUTION INTRAMUSCULAR; INTRAVENOUS at 18:34

## 2023-02-02 RX ADMIN — BUPROPION HYDROCHLORIDE 75 MG: 75 TABLET, FILM COATED ORAL at 08:11

## 2023-02-02 RX ADMIN — BUPROPION HYDROCHLORIDE 75 MG: 75 TABLET, FILM COATED ORAL at 17:03

## 2023-02-02 RX ADMIN — MAGNESIUM SULFATE HEPTAHYDRATE 2 G: 40 INJECTION, SOLUTION INTRAVENOUS at 10:22

## 2023-02-02 RX ADMIN — LISINOPRIL 40 MG: 20 TABLET ORAL at 08:11

## 2023-02-02 RX ADMIN — CLOPIDOGREL BISULFATE 75 MG: 75 TABLET ORAL at 08:11

## 2023-02-02 RX ADMIN — MAGNESIUM 64 MG (MAGNESIUM CHLORIDE) TABLET,DELAYED RELEASE 64 MG: at 08:12

## 2023-02-02 RX ADMIN — OXYCODONE 5 MG: 5 TABLET ORAL at 17:03

## 2023-02-02 RX ADMIN — INSULIN GLARGINE 20 UNITS: 100 INJECTION, SOLUTION SUBCUTANEOUS at 08:12

## 2023-02-02 RX ADMIN — HYDRALAZINE HYDROCHLORIDE 20 MG: 20 INJECTION, SOLUTION INTRAMUSCULAR; INTRAVENOUS at 05:46

## 2023-02-02 RX ADMIN — HYDRALAZINE HYDROCHLORIDE 100 MG: 50 TABLET, FILM COATED ORAL at 22:37

## 2023-02-02 RX ADMIN — METOPROLOL TARTRATE 25 MG: 25 TABLET, FILM COATED ORAL at 08:11

## 2023-02-02 RX ADMIN — OXYCODONE 5 MG: 5 TABLET ORAL at 22:31

## 2023-02-02 RX ADMIN — AMOXICILLIN AND CLAVULANATE POTASSIUM 1 TABLET: 875; 125 TABLET, FILM COATED ORAL at 08:11

## 2023-02-02 RX ADMIN — AMLODIPINE BESYLATE 10 MG: 10 TABLET ORAL at 08:11

## 2023-02-02 RX ADMIN — BUPROPION HYDROCHLORIDE 150 MG: 150 TABLET, FILM COATED, EXTENDED RELEASE ORAL at 10:20

## 2023-02-02 RX ADMIN — MICAFUNGIN SODIUM 150 MG: 50 INJECTION, POWDER, LYOPHILIZED, FOR SOLUTION INTRAVENOUS at 19:52

## 2023-02-02 RX ADMIN — PANTOPRAZOLE SODIUM 40 MG: 40 TABLET, DELAYED RELEASE ORAL at 05:00

## 2023-02-02 NOTE — CASE MANAGEMENT
Case Management Discharge Planning Note    Patient name Amandeep Ro  Location [place] [address] 2 /[address] 2 Angie Cruz* MRN [id number]  : 1960 Date 2023       Current Admission Date: 2023  Current Admission Diagnosis:Sepsis due to urinary tract infection [facility])   Patient Active Problem List    Diagnosis Date Noted   • Osteomyelitis ([address] ) 2023   • Altered mental status 2023   • Forehead laceration 2023   • Bacteremia 2023   • Cardiac arrest ([address] ) 2023   • Troponin level elevated 2023   • Hypomagnesemia 2023   • Sepsis due to urinary tract infection ([address] ) 2023   • Fall 2023   • Mild protein-calorie malnutrition ([address] ) 2022   • Diarrhea 2022   • CVA (cerebral vascular accident) ([address] ) 2022   • HIT (heparin-induced thrombocytopenia) 2022   • Diabetic ulcer of heel associated with diabetes mellitus due to underlying condition ([address] ) 11/10/2022   • Anemia 10/30/2022   • Generalized edema due to fluid overload 10/27/2022   • PAD (peripheral artery disease) ([address] ) 10/25/2022   • Non healing left heel wound 10/22/2022   • Hypertensive urgency 10/22/2022   • Hypokalemia 10/22/2022   • Wound of lower extremity 10/21/2022   • Epigastric pain 2022   • Type 2 diabetes mellitus with hyperglycemia, with long-term current use of insulin ([address] ) 10/18/2021   • Hyperparathyroidism ([address] ) 10/18/2021   • 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 ([address] ) 2021   • 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   • Moderate protein-calorie malnutrition ([address] ) 2020   • Acute colitis 2020   • Arthritis 2019   • Depression, recurrent ([address] ) 2018   • Class 3 severe obesity due to excess calories with serious comorbidity and body mass index (BMI) of 40 0 to 44 9 in adult ([facility] ) 2018   • Esophageal reflux 2018   • DM (diabetes mellitus) type II, controlled, with peripheral vascular disorder ([facility] ) 02/23/2018   • Diabetic peripheral neuropathy ([place] [address] ) 35/30/0937   • Systolic murmur 12/46/4663   • Stroke ([place] [address] ) 12/14/2015   • History of CVA (cerebrovascular accident) 12/14/2015   • Anxiety 03/19/2015   • Vitamin D deficiency 03/19/2015   • Hypertension 08/28/2012   • Familial combined hyperlipidemia 08/28/2012      LOS (days): 16  Geometric Mean LOS (GMLOS) (days): 9 60  Days to GMLOS:-6 1     OBJECTIVE:  Risk of Unplanned Readmission Score: 45 9         Current admission status: Inpatient   [facility] Pharm[facility]   [id number] [address] [place] [address] [address]  Phone: [phone] Fax: [phone]    Primary Care Provider: ALEX Vasquez    Primary Insurance: [facility]  Secondary Insurance:     DISCHARGE DETAILS:    Per MD, patient is not medically stable for discharge- monitoring high blood pressures  Anticipated discharge in 24hrs  DC plan remains for patient to go to [facility] rehab  CM reached out to rehab to see if they can accommodate medication- Cresemba  CM will continue to follow patient  [facility] rehab requesting authorization to be started  CM assigned to CM discharge support

## 2023-02-02 NOTE — ASSESSMENT & PLAN NOTE
Replete PRN; possibly related to hyperaldosteronism?     Recent Labs     01/31/23  0612 02/01/23  0509 02/02/23  0505   K 3 1* 3 8 3 7   MG 1 8* 1 7* 1 6*

## 2023-02-02 NOTE — ASSESSMENT & PLAN NOTE
Lab Results   Component Value Date    HGBA1C 9 8 (H) 10/25/2022       Recent Labs     02/01/23  1615 02/02/23  0007 02/02/23  0147 02/02/23  0545   POCGLU 95 66 102 81       Blood Sugar Average: Last 72 hrs:  (P) 62 [id number]     Home regimen: Lantus 60U HS, Novolog 2oU TID prior to meals  Inpatient regimen: Lantus 20UHS, sliding scale

## 2023-02-02 NOTE — PLAN OF CARE
Problem: MOBILITY - ADULT  Goal: Maintain or return to baseline ADL function  Description: INTERVENTIONS:  -  Assess patient's ability to carry out ADLs; assess patient's baseline for ADL function and identify physical deficits which impact ability to perform ADLs (bathing, care of mouth/teeth, toileting, grooming, dressing, etc )  - Assess/evaluate cause of self-care deficits   - Assess range of motion  - Assess patient's mobility; develop plan if impaired  - Assess patient's need for assistive devices and provide as appropriate  - Encourage maximum independence but intervene and supervise when necessary  - Involve family in performance of ADLs  - Assess for home care needs following discharge   - Consider OT consult to assist with ADL evaluation and planning for discharge  - Provide patient education as appropriate  Outcome: Progressing  Goal: Maintains/Returns to pre admission functional level  Description: INTERVENTIONS:  - Perform BMAT or MOVE assessment daily    - Set and communicate daily mobility goal to care team and patient/family/caregiver  - Collaborate with rehabilitation services on mobility goals if consulted  - Perform Range of Motion 4 times a day  - Reposition patient every 2 hours    - Dangle patient 4 times a day  - Stand patient 4 times a day  - Ambulate patient 4 times a day  - Out of bed to chair 4 times a day   - Out of bed for meals 4 times a day  - Out of bed for toileting  - Record patient progress and toleration of activity level   Outcome: Progressing     Problem: Prexisting or High Potential for Compromised Skin Integrity  Goal: Skin integrity is maintained or improved  Description: INTERVENTIONS:  - Identify patients at risk for skin breakdown  - Assess and monitor skin integrity  - Assess and monitor nutrition and hydration status  - Monitor labs   - Assess for incontinence   - Turn and reposition patient  - Assist with mobility/ambulation  - Relieve pressure over bony prominences  - Avoid friction and shearing  - Provide appropriate hygiene as needed including keeping skin clean and dry  - Evaluate need for skin moisturizer/barrier cream  - Collaborate with interdisciplinary team   - Patient/family teaching  - Consider wound care consult   Outcome: Progressing     Problem: PAIN - ADULT  Goal: Verbalizes/displays adequate comfort level or baseline comfort level  Description: Interventions:  - Encourage patient to monitor pain and request assistance  - Assess pain using appropriate pain scale  - Administer analgesics based on type and severity of pain and evaluate response  - Implement non-pharmacological measures as appropriate and evaluate response  - Consider cultural and social influences on pain and pain management  - Notify physician/advanced practitioner if interventions unsuccessful or patient reports new pain  Outcome: Progressing     Problem: INFECTION - ADULT  Goal: Absence or prevention of progression during hospitalization  Description: INTERVENTIONS:  - Assess and monitor for signs and symptoms of infection  - Monitor lab/diagnostic results  - Monitor all insertion sites, i e  indwelling lines, tubes, and drains  - Monitor endotracheal if appropriate and nasal secretions for changes in amount and color  - [place] appropriate cooling/warming therapies per order  - Administer medications as ordered  - Instruct and encourage patient and family to use good hand hygiene technique  - Identify and instruct in appropriate isolation precautions for identified infection/condition  Outcome: Progressing     Problem: SAFETY ADULT  Goal: Maintain or return to baseline ADL function  Description: INTERVENTIONS:  -  Assess patient's ability to carry out ADLs; assess patient's baseline for ADL function and identify physical deficits which impact ability to perform ADLs (bathing, care of mouth/teeth, toileting, grooming, dressing, etc )  - Assess/evaluate cause of self-care deficits - Assess range of motion  - Assess patient's mobility; develop plan if impaired  - Assess patient's need for assistive devices and provide as appropriate  - Encourage maximum independence but intervene and supervise when necessary  - Involve family in performance of ADLs  - Assess for home care needs following discharge   - Consider OT consult to assist with ADL evaluation and planning for discharge  - Provide patient education as appropriate  Outcome: Progressing  Goal: Maintains/Returns to pre admission functional level  Description: INTERVENTIONS:  - Perform BMAT or MOVE assessment daily    - Set and communicate daily mobility goal to care team and patient/family/caregiver  - Collaborate with rehabilitation services on mobility goals if consulted  - Perform Range of Motion 4 times a day  - Reposition patient every 2 hours    - Dangle patient 4 times a day  - Stand patient 4 times a day  - Ambulate patient 4 times a day  - Out of bed to chair 4 times a day   - Out of bed for meals 4 times a day  - Out of bed for toileting  - Record patient progress and toleration of activity level   Outcome: Progressing  Goal: Patient will remain free of falls  Description: INTERVENTIONS:  -  Assess patient's ability to carry out ADLs; assess patient's baseline for ADL function and identify physical deficits which impact ability to perform ADLs (bathing, care of mouth/teeth, toileting, grooming, dressing, etc )  - Assess/evaluate cause of self-care deficits   - Assess range of motion  - Assess patient's mobility; develop plan if impaired  - Assess patient's need for assistive devices and provide as appropriate  - Encourage maximum independence but intervene and supervise when necessary  - Involve family in performance of ADLs  - Assess for home care needs following discharge   - Consider OT consult to assist with ADL evaluation and planning for discharge  - Provide patient education as appropriate  Outcome: Progressing Problem: DISCHARGE PLANNING  Goal: Discharge to home or other facility with appropriate resources  Description: INTERVENTIONS:  - Identify barriers to discharge w/patient and caregiver  - Arrange for needed discharge resources and transportation as appropriate  - Identify discharge learning needs (meds, wound care, etc )  - Arrange for interpretive services to assist at discharge as needed  - Refer to Case Management Department for coordinating discharge planning if the patient needs post-hospital services based on physician/advanced practitioner order or complex needs related to functional status, cognitive ability, or social support system  Outcome: Progressing     Problem: Knowledge Deficit  Goal: Patient/family/caregiver demonstrates understanding of disease process, treatment plan, medications, and discharge instructions  Description: Complete learning assessment and assess knowledge base  Interventions:  - Provide teaching at level of understanding  - Provide teaching via preferred learning methods  Outcome: Progressing     Problem: Nutrition/Hydration-ADULT  Goal: Nutrient/Hydration intake appropriate for improving, restoring or maintaining nutritional needs  Description: Monitor and assess patient's nutrition/hydration status for malnutrition  Collaborate with interdisciplinary team and initiate plan and interventions as ordered  Monitor patient's weight and dietary intake as ordered or per policy  Utilize nutrition screening tool and intervene as necessary  Determine patient's food preferences and provide high-protein, high-caloric foods as appropriate       INTERVENTIONS:  - Monitor oral intake, urinary output, labs, and treatment plans  - Assess nutrition and hydration status and recommend course of action  - Evaluate amount of meals eaten  - Assist patient with eating if necessary   - Allow adequate time for meals  - Recommend/ encourage appropriate diets, oral nutritional supplements, and vitamin/mineral supplements  - Order, calculate, and assess calorie counts as needed  - Recommend, monitor, and adjust tube feedings and TPN/PPN based on assessed needs  - Assess need for intravenous fluids  - Provide specific nutrition/hydration education as appropriate  - Include patient/family/caregiver in decisions related to nutrition  Outcome: Progressing     Problem: COPING  Goal: Pt/Family able to verbalize concerns and demonstrate effective coping strategies  Description: INTERVENTIONS:  - Assist patient/family to identify coping skills, available support systems and cultural and spiritual values  - Provide emotional support, including active listening and acknowledgement of concerns of patient and caregivers  - Reduce environmental stimuli, as able  - Provide patient education  - Assess for spiritual pain/suffering and initiate spiritual care, including notification of Pastoral Care or chidi based community as needed  - Assess effectiveness of coping strategies  Outcome: Progressing  Goal: Will report anxiety at manageable levels  Description: INTERVENTIONS:  - Administer medication as ordered  - Teach and encourage coping skills  - Provide emotional support  - Assess patient/family for anxiety and ability to cope  Outcome: Progressing     Problem: SKIN/TISSUE INTEGRITY - ADULT  Goal: Skin Integrity remains intact(Skin Breakdown Prevention)  Description: Assess:  -Perform Nicanor assessment every shift  -Clean and moisturize skin every 2 hours  -Inspect skin when repositioning, toileting, and assisting with ADLS  -Assess under medical devices such as picc every shift  -Assess extremities for adequate circulation and sensation     Bed Management:  -Have minimal linens on bed & keep smooth, unwrinkled  -Change linens as needed when moist or perspiring  -Avoid sitting or lying in one position for more than 2 hours while in bed  -Keep HOB at 30 degrees     Toileting:  -Offer bedside commode  -Assess for incontinence every 2 hours  -Use incontinent care products after each incontinent episode such as cleanser    Activity:  -Encourage activity and walks on unit  -Encourage or provide ROM exercises   -Turn and reposition patient every 2 Hours  -Use appropriate equipment to lift or move patient in bed      Skin Care:  -Avoid use of baby powder, tape, friction and shearing, hot water or constrictive clothing  -Relieve pressure over bony prominences using pillows, wedges  -Do not massage red bony areas    Next Steps:  -Teach patient strategies to minimize risks such as falling   -Consider consults to  interdisciplinary teams such as pt/ot  Outcome: Progressing  Goal: Incision(s), wounds(s) or drain site(s) healing without S/S of infection  Description: INTERVENTIONS  - Assess and document dressing incision, wound bed, drain sites and surrounding tissue  - Provide patient and family education  - Perform skin care/dressing changes every daily  Outcome: Progressing

## 2023-02-02 NOTE — PLAN OF CARE
Problem: MOBILITY - ADULT  Goal: Maintain or return to baseline ADL function  Description: INTERVENTIONS:  -  Assess patient's ability to carry out ADLs; assess patient's baseline for ADL function and identify physical deficits which impact ability to perform ADLs (bathing, care of mouth/teeth, toileting, grooming, dressing, etc )  - Assess/evaluate cause of self-care deficits   - Assess range of motion  - Assess patient's mobility; develop plan if impaired  - Assess patient's need for assistive devices and provide as appropriate  - Encourage maximum independence but intervene and supervise when necessary  - Involve family in performance of ADLs  - Assess for home care needs following discharge   - Consider OT consult to assist with ADL evaluation and planning for discharge  - Provide patient education as appropriate  Outcome: Progressing  Goal: Maintains/Returns to pre admission functional level  Description: INTERVENTIONS:  - Perform BMAT or MOVE assessment daily    - Set and communicate daily mobility goal to care team and patient/family/caregiver  - Collaborate with rehabilitation services on mobility goals if consulted  - Perform Range of Motion 4 times a day  - Reposition patient every 2 hours    - Dangle patient 4 times a day  - Stand patient 4 times a day  - Ambulate patient 4 times a day  - Out of bed to chair 4 times a day   - Out of bed for meals 4 times a day  - Out of bed for toileting  - Record patient progress and toleration of activity level   Outcome: Progressing     Problem: Prexisting or High Potential for Compromised Skin Integrity  Goal: Skin integrity is maintained or improved  Description: INTERVENTIONS:  - Identify patients at risk for skin breakdown  - Assess and monitor skin integrity  - Assess and monitor nutrition and hydration status  - Monitor labs   - Assess for incontinence   - Turn and reposition patient  - Assist with mobility/ambulation  - Relieve pressure over bony prominences  - Avoid friction and shearing  - Provide appropriate hygiene as needed including keeping skin clean and dry  - Evaluate need for skin moisturizer/barrier cream  - Collaborate with interdisciplinary team   - Patient/family teaching  - Consider wound care consult   Outcome: Progressing     Problem: PAIN - ADULT  Goal: Verbalizes/displays adequate comfort level or baseline comfort level  Description: Interventions:  - Encourage patient to monitor pain and request assistance  - Assess pain using appropriate pain scale  - Administer analgesics based on type and severity of pain and evaluate response  - Implement non-pharmacological measures as appropriate and evaluate response  - Consider cultural and social influences on pain and pain management  - Notify physician/advanced practitioner if interventions unsuccessful or patient reports new pain  Outcome: Progressing     Problem: INFECTION - ADULT  Goal: Absence or prevention of progression during hospitalization  Description: INTERVENTIONS:  - Assess and monitor for signs and symptoms of infection  - Monitor lab/diagnostic results  - Monitor all insertion sites, i e  indwelling lines, tubes, and drains  - Monitor endotracheal if appropriate and nasal secretions for changes in amount and color  - Grimes appropriate cooling/warming therapies per order  - Administer medications as ordered  - Instruct and encourage patient and family to use good hand hygiene technique  - Identify and instruct in appropriate isolation precautions for identified infection/condition  Outcome: Progressing     Problem: SAFETY ADULT  Goal: Maintain or return to baseline ADL function  Description: INTERVENTIONS:  -  Assess patient's ability to carry out ADLs; assess patient's baseline for ADL function and identify physical deficits which impact ability to perform ADLs (bathing, care of mouth/teeth, toileting, grooming, dressing, etc )  - Assess/evaluate cause of self-care deficits - Assess range of motion  - Assess patient's mobility; develop plan if impaired  - Assess patient's need for assistive devices and provide as appropriate  - Encourage maximum independence but intervene and supervise when necessary  - Involve family in performance of ADLs  - Assess for home care needs following discharge   - Consider OT consult to assist with ADL evaluation and planning for discharge  - Provide patient education as appropriate  Outcome: Progressing  Goal: Maintains/Returns to pre admission functional level  Description: INTERVENTIONS:  - Perform BMAT or MOVE assessment daily    - Set and communicate daily mobility goal to care team and patient/family/caregiver  - Collaborate with rehabilitation services on mobility goals if consulted  - Perform Range of Motion 4 times a day  - Reposition patient every 2 hours    - Dangle patient 4 times a day  - Stand patient 4 times a day  - Ambulate patient 4 times a day  - Out of bed to chair 4 times a day   - Out of bed for meals 4 times a day  - Out of bed for toileting  - Record patient progress and toleration of activity level   Outcome: Progressing  Goal: Patient will remain free of falls  Description: INTERVENTIONS:  - Educate patient/family on patient safety including physical limitations  - Instruct patient to call for assistance with activity   - Consult OT/PT to assist with strengthening/mobility   - Keep Call bell within reach  - Keep bed low and locked with side rails adjusted as appropriate  - Keep care items and personal belongings within reach  - Initiate and maintain comfort rounds  - Make Fall Risk Sign visible to staff  - Offer Toileting every  Hours, in advance of need  - Initiate/Maintain bed/ chair alarm  - Obtain necessary fall risk management equipment:  - Apply yellow socks and bracelet for high fall risk patients  - Consider moving patient to room near nurses station  Outcome: Progressing     Problem: DISCHARGE PLANNING  Goal: Discharge to home or other facility with appropriate resources  Description: INTERVENTIONS:  - Identify barriers to discharge w/patient and caregiver  - Arrange for needed discharge resources and transportation as appropriate  - Identify discharge learning needs (meds, wound care, etc )  - Arrange for interpretive services to assist at discharge as needed  - Refer to Case Management Department for coordinating discharge planning if the patient needs post-hospital services based on physician/advanced practitioner order or complex needs related to functional status, cognitive ability, or social support system  Outcome: Progressing     Problem: Knowledge Deficit  Goal: Patient/family/caregiver demonstrates understanding of disease process, treatment plan, medications, and discharge instructions  Description: Complete learning assessment and assess knowledge base  Interventions:  - Provide teaching at level of understanding  - Provide teaching via preferred learning methods  Outcome: Progressing     Problem: Nutrition/Hydration-ADULT  Goal: Nutrient/Hydration intake appropriate for improving, restoring or maintaining nutritional needs  Description: Monitor and assess patient's nutrition/hydration status for malnutrition  Collaborate with interdisciplinary team and initiate plan and interventions as ordered  Monitor patient's weight and dietary intake as ordered or per policy  Utilize nutrition screening tool and intervene as necessary  Determine patient's food preferences and provide high-protein, high-caloric foods as appropriate       INTERVENTIONS:  - Monitor oral intake, urinary output, labs, and treatment plans  - Assess nutrition and hydration status and recommend course of action  - Evaluate amount of meals eaten  - Assist patient with eating if necessary   - Allow adequate time for meals  - Recommend/ encourage appropriate diets, oral nutritional supplements, and vitamin/mineral supplements  - Order, calculate, and assess calorie counts as needed  - Recommend, monitor, and adjust tube feedings and TPN/PPN based on assessed needs  - Assess need for intravenous fluids  - Provide specific nutrition/hydration education as appropriate  - Include patient/family/caregiver in decisions related to nutrition  Outcome: Progressing     Problem: COPING  Goal: Pt/Family able to verbalize concerns and demonstrate effective coping strategies  Description: INTERVENTIONS:  - Assist patient/family to identify coping skills, available support systems and cultural and spiritual values  - Provide emotional support, including active listening and acknowledgement of concerns of patient and caregivers  - Reduce environmental stimuli, as able  - Provide patient education  - Assess for spiritual pain/suffering and initiate spiritual care, including notification of Pastoral Care or chidi based community as needed  - Assess effectiveness of coping strategies  Outcome: Progressing  Goal: Will report anxiety at manageable levels  Description: INTERVENTIONS:  - Administer medication as ordered  - Teach and encourage coping skills  - Provide emotional support  - Assess patient/family for anxiety and ability to cope  Outcome: Progressing     Problem: SKIN/TISSUE INTEGRITY - ADULT  Goal: Skin Integrity remains intact(Skin Breakdown Prevention)  Description: Assess:  -Perform Nicanor assessment every shift  -Clean and moisturize skin every 2 hours  -Inspect skin when repositioning, toileting, and assisting with ADLS  -Assess under medical devices such as picc every shift  -Assess extremities for adequate circulation and sensation     Bed Management:  -Have minimal linens on bed & keep smooth, unwrinkled  -Change linens as needed when moist or perspiring  -Avoid sitting or lying in one position for more than 2 hours while in bed  -Keep HOB at 30 degrees     Toileting:  -Offer bedside commode  -Assess for incontinence every 2 hours  -Use incontinent care products after each incontinent episode such as cleanser    Activity:  -Encourage activity and walks on unit  -Encourage or provide ROM exercises   -Turn and reposition patient every 2 Hours  -Use appropriate equipment to lift or move patient in bed      Skin Care:  -Avoid use of baby powder, tape, friction and shearing, hot water or constrictive clothing  -Relieve pressure over bony prominences using pillows, wedges  -Do not massage red bony areas    Next Steps:  -Teach patient strategies to minimize risks such as falling   -Consider consults to  interdisciplinary teams such as pt/ot  Outcome: Progressing  Goal: Incision(s), wounds(s) or drain site(s) healing without S/S of infection  Description: INTERVENTIONS  - Assess and document dressing, incision, wound bed, drain sites and surrounding tissue  - Provide patient and family education  - Perform skin care/dressing changes every daily  Outcome: Progressing

## 2023-02-02 NOTE — ASSESSMENT & PLAN NOTE
Hemoglobin continues to improve; 8 2 on morning labs  Monitor and transfuse for hemoglobin of 7 or less

## 2023-02-02 NOTE — SPEECH THERAPY NOTE
Speech Language/Pathology    Speech/Language Pathology Progress Note    Patient Name: Gus Stratton Date: 2/2/2023    Subjective:  "This is really good"    Objective:  Patient seen in bed with lunch tray  She politely refused to sit fully upright ( semi- reclined)  She consumed meatloaf, chopped carrots and mashed potatoes with thin liquids  Mastication was adequate with the materials administered today  Bolus formation and transfer were functional with no significant oral residue noted  No overt s/s reduced oral control  No coughing, throat clearing, change in vocal quality or respiratory status noted today  Assessment:  Patients oropharyngeal swallow function appears to be unchanged/ likely at baseline  No sxs of aspiration today- risk appears low aside from poor positioning  Patient was provided education on this  Reciprocal comprehensions was verbally expressed  Plan/Recommendations:  Continue current diet ( per patient she would like to remain on dysph 3 at this time)    No further ST indicated at this time      KELLY Padilla S , [id number] [facility]  Speech Language Pathologist   Available via [address] [place] [address] #[id number]  [place] #[id number]

## 2023-02-02 NOTE — ASSESSMENT & PLAN NOTE
Patient currently on amlodipine, hydralazine, metoprolol and lisinopril  Blood pressure noted to be highly elevated; increased hydralazine to 50 mg 3 times daily on 1/31  Initially improved, however blood pressure noted to be highly elevated overnight; increased metoprolol to 50 mg twice daily  Additionally, will add HCTZ 12 5 mg daily; monitor overnight    Despite increasing medications, blood pressure is unfazed and appears to actually be increasing  Reviewed chart and noted the blood pressure in the 160s 170s from all her recent primary care visits  We will rule out secondary causes and have ordered aldosterone, renin, metanephrines, and renal Doppler

## 2023-02-02 NOTE — PROGRESS NOTES
Progress Note - Infectious Disease   Tatiana Alcantara 65 y o  female MRN: [id number]  Unit/Bed#: [place] [address] -01 Encounter: [id number]      Impression/Plan:    1   Sepsis, POA   Patient met sepsis criteria on presentation   Sources are 2 and 3  Course complicated by 4   Extensive resistance developing on prior cultures   Clinical parameters overall improved except for leukocytosis   Ongoing white count likely related to the right stump, now downtrending post interventions   -Antibiotics and antifungal plan as below  -continue to trend fevers, vitals, WBC count  -Maintain current PICC line  -Additional supportive care as per primary  -Duration of therapy as below     2   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   Blood cultures have isolated Enterobacter and Enterococcus faecalis  Enterobacter resistant to Ertapenem but no carbapenemase present on confirmatory testing   Bacteremia likely due to infection of the right AKA stump given prior wound cultures and now findings on CT imaging with contrast  2D echo limited  No other obvious ectopic focus of infection on extensive imaging   Repeat cultures cleared rapidly   Wound cultures from 1/24 with a few colonies of staph likely colonizing the open wound and yeast only in broth culture likely not significant   Transesophageal echo completed on 1/27 which was without vegetation   Underwent debridement of the right stump with vascular on 1/27   OR cultures now showing candida albicans, Enterococcus and Enterobacter  Plan for combination oral and IV therapy  EKG with prolonged QTC limiting antifungal options  Completed 7 days meropenem for Enterobacter bacteremia and now transition to oral doxycycline   Patient completed 2 weeks IV Ampicillin for Enterococcal bacteremia  -continue PO doxycycline 100 every 12 hours for Enterobacter isolated in bone  - transition to Augmentin 875 every 12 hours for Enterococcus in bone  -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 mg every 24 for fungal osteomyelitis  -Patient's only oral antifungal option is Cresemba, likely cost prohibitive for facility  -Case management discussing coverage with the facility   -Plan for 6 weeks of antibiotic coverage for bone and joint infection through 3/9/23  -Plan for total 6 months of antifungal coverage for bone and joint infection through 23  -Maintain PICC line for now  -Continue to trend fever curve/vitals  -Repeat CBC and CMP tomorrow  -We will arrange ID follow-up closer to discharge     3   Complicated urinary tract infection   Patient unable to provide any history  Mago Ordoñezr was grossly abnormal on presentation   Possibly contributed to presentation   Higher suspicion for wound infection as above   Course of therapy completed for this issue   -Continue antibiotic as above  -Monitor abdominal exam  -Catheters as per primary     4   Cardiac arrest   Suspect that this may have been unrelated to the above   Patient with multiple other comorbidities as well   Ongoing care and work-up as per critical care/cardiology  Patient extubated 2023     5   Transaminitis   Acute elevation noted likely in the setting of 4   We will need to monitor LFTs however on antibiotics above  Currently stable  -Antibiotic as above  -Repeat LFTs tomorrow  -Monitor abdominal exam     Above plan discussed in detail with patient at bedside and case management  ID will follow  Antibiotics:  Augmentin day 1  Micafungin 4  Doxycycline 3  Total antibiotic 16    Subjective: The patient reports ongoing pain in the right stump  She is concerned about her blood pressure  Patient denies fevers, chills, nausea, vomiting       Objective:  Vitals:  Temp:  [97 5 °F (36 4 °C)-97 9 °F (36 6 °C)] 97 5 °F (36 4 °C)  HR:  [83-91] 87  Resp:  [18] 18  BP: (169-202)/() 186/97  SpO2:  [93 %-98 %] 97 %  Temp (24hrs), Av 6 °F (36 4 °C), Min:97 5 °F (36 4 °C), Max:97 9 °F (36 6 °C)  Current: Temperature: 97 5 °F (36 4 °C)    Physical Exam:   General Appearance:  Chronically ill appearing but interactive, nontoxic, no acute distress  Throat: Oropharynx moist without lesions  Lungs:   Clear to auscultation bilaterally; no wheezes, rhonchi or rales; respirations unlabored   Heart:  RRR; no murmur, rub or gallop   Abdomen:   Soft, non-tender, non-distended, positive bowel sounds  Extremities: No clubbing, cyanosis or edema   Skin: No new rashes or lesions  No new draining wounds noted  Areas of bruising are healing  Right stump with dressing in place       Labs:    All pertinent labs and imaging studies were personally reviewed  Results from last 7 days   Lab Units 02/02/23  0505 02/01/23  0509 01/31/23  0612   WBC Thousand/uL 15 60* 18 01* 17 02*   HEMOGLOBIN g/dL 8 2* 7 9* 7 4*   PLATELETS Thousands/uL 401* 452* 406*     Results from last 7 days   Lab Units 02/02/23  0505 02/01/23  0509 01/31/23  0612   SODIUM mmol/L 137 138 139   POTASSIUM mmol/L 3 7 3 8 3 1*   CHLORIDE mmol/L 104 105 105   CO2 mmol/L 27 28 28   BUN mg/dL 7 6 5   CREATININE mg/dL 0 50* 0 48* 0 39*   EGFR ml/min/1 73sq m 104 106 113   CALCIUM mg/dL 7 9* 7 7* 7 2*   AST U/L 13 13 13   ALT U/L 9 9 8   ALK PHOS U/L 191* 184* 164*                       Micro:  Results from last 7 days   Lab Units 01/27/23  1610   GRAM STAIN RESULT  2+ Polys  No bacteria seen     No new imaging available

## 2023-02-02 NOTE — PLAN OF CARE
Problem: OCCUPATIONAL THERAPY ADULT  Goal: Performs self-care activities at highest level of function for planned discharge setting  See evaluation for individualized goals  Description: Treatment Interventions: ADL retraining, Functional transfer training, UE strengthening/ROM, Endurance training, Cognitive reorientation, Patient/family training, Equipment evaluation/education, Compensatory technique education, Continued evaluation          See flowsheet documentation for full assessment, interventions and recommendations  Note: Limitation: Decreased ADL status, Decreased UE strength, Decreased Safe judgement during ADL, Decreased cognition, Decreased endurance, Decreased high-level ADLs  Prognosis: Fair  Assessment: Pt is a 66y/o female admitted to the hospital after a fall from her wheelchair with facial trauma; x-ray/CT=neg acute findings; L LE= nondisplaced fracture of the osseous stump with evidence of healing  Pt noted with sepsis 2* UTI, HTN  During this admission, pt had a cardiac arrest  Pt with PMH uncontrolled diabetes mellitus, severe peripheral artery disease with recent(11/22) bilateral above-the-knee amputations (the admission was complicated by fungal infection of the Perclose site, urinary tract infection, heparin-induced thrombocytopenia and stroke), LBP, anxiety  PTA pt states that she had assistance with her ADLs; hoyerlift for OOB; able to assist with w/c mobility; +falls=1(at SNF)  During initial eval, pt demonstrated deficits with her functional balance, functional mobility, ADL status, b/l UE strength, activity tolerance(currently fair=15-20mins), and cognition(i e problem-solving, memory)  Pt would benefit from continued OT tx for the above deficits  2-3xwk/1-2wks  The patient's raw score on the -PAC Daily Activity Inpatient Short Form is 14  A raw score of less than 19 suggests the patient may benefit from discharge to post-acute rehabilitation services   Please refer to the recommendation of the Occupational Therapist for safe discharge planning       OT Discharge Recommendation: Return to facility with rehabilitation services

## 2023-02-02 NOTE — PHYSICAL THERAPY NOTE
Physical Therapy Evaluation    Performed at least 2 patient identifiers during session:  Patient Active Problem List   Diagnosis    Esophageal reflux    DM (diabetes mellitus) type II, controlled, with peripheral vascular disorder ([facility])    Class 3 severe obesity due to excess calories with serious comorbidity and body mass index (BMI) of 40 0 to 44 9 in adult [facility])    Depression, recurrent ([facility] )    Anxiety    Hypertension    Stroke ([facility] )    Diabetic peripheral neuropathy ([facility] )    Vitamin D deficiency    Systolic murmur    Familial combined hyperlipidemia    Arthritis    Acute colitis    Moderate protein-calorie malnutrition ([facility] )    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)    Hyperparathyroidism ([facility] )    Epigastric pain    Wound of lower extremity    Non healing left heel wound    Hypertensive urgency    Hypokalemia    PAD (peripheral artery disease) ([facility])    Generalized edema due to fluid overload    Anemia    Diabetic ulcer of heel associated with diabetes mellitus due to underlying condition ([facility])    HIT (heparin-induced thrombocytopenia)    CVA (cerebral vascular accident) ([facility] )    Diarrhea    Mild protein-calorie malnutrition ([facility] )    History of CVA (cerebrovascular accident)    Sepsis due to urinary tract infection ([facility] )    Fall    Cardiac arrest ([facility])    Troponin level elevated    Hypomagnesemia    Bacteremia    Forehead laceration    Osteomyelitis ([facility])    Altered mental status       Past Medical History:   Diagnosis Date    Anxiety     Depression     Diabetes ([facility] )     Diabetes mellitus ([facility] )     Esophageal reflux     28 APR 2015 RESOLVED    Hyperlipidemia     Hypertension     Low back pain     sciatica    Pneumonia 2019    Stroke ([facility] ) 12/2015    small vessel, L frontal corona radiata   Rx asa 81, Lipitor 40, risk modification    Vitamin D deficiency        Past Surgical History:   Procedure Laterality Date    AMPUTATION ABOVE KNEE (AKA) Right 12/1/2022    Procedure: (AKA), PSB  BKA;  Surgeon: Bharati Baldwin DO;  [facility];  Service: Vascular    ARTERIOGRAM Right 11/7/2022    Procedure: -Right lower extremity angiogram -Right CFA balloon angioplasty with [id number]  [employer]  -Right CFA DCB with 6x40 Bard Lutonix -Right CFA atherectomy with Jetstream and distal embolization protection with 7mm Spider FX -Right SFA/Pop angioplasty with 4x40 Chololate balloon -Right SFA/Pop DCB with 5x150 Bard Lutonix -Right SFA stent with 6x80 W W  [employer] x2 -R    COLONOSCOPY      IR AORTAGRAM WITH RUN-OFF  10/31/2022    IR LOWER EXTREMITY ANGIOGRAM  11/10/2022    IR LOWER EXTREMITY ANGIOGRAM  11/7/2022    MO AMP LEG THRU TIBIA&FIBULA SEC CLOSURE/SCAR REV Right 1/27/2023    Procedure: AMPUTATION REVISION STUMP;  Surgeon: Bharati Baldwin DO;  [facility];  Service: Vascular    MO AMPUTATION THIGH THROUGH FEMUR ANY LEVEL Left 11/23/2022    Procedure: (AKA); Surgeon: Tawanda Valente DO;  [facility];  Service: Vascular    MO NEGATIVE PRESSURE WOUND THERAPY DME </= 50 SQ CM Bilateral 12/19/2022    Procedure: Right above knee amputation washout; vac change; Left above knee amputation debridement; vac placement;  Surgeon: Bharati Baldwin DO;  [facility];  Service: Vascular    WOUND DEBRIDEMENT Right 12/16/2022    Procedure: AKA STUMP WASHOUT, Left AKA Staple removal  application of wound vac to Right AKA;   Surgeon: Cayden Salcedo MD;  [facility];  Service: Vascular        02/02/23 1433   PT Last Visit   PT Visit Date 02/02/23   Note Type   Note type Evaluation   Pain Assessment   Pain Assessment Tool FLACC   Pain Location/Orientation Orientation: Right  (AK stump)   Pain Rating: FLACC (Rest) - Face 0   Pain Rating: FLACC (Rest) - Legs 0   Pain Rating: FLACC (Rest) - Activity 0   Pain Rating: FLACC (Rest) - Cry 0   Pain Rating: FLACC (Rest) - Consolability 0   Score: FLACC (Rest) 0   Pain Rating: FLACC (Activity) - Face 1   Pain Rating: FLACC (Activity) - Legs 0   Pain Rating: FLACC (Activity) - Activity 0   Pain Rating: FLACC (Activity) - Cry 1   Pain Rating: FLACC (Activity) - Consolability 0   Score: FLACC (Activity) 2   Restrictions/Precautions   Weight Bearing Precautions Per Order Yes   RLE Weight Bearing Per Order NWB   LLE Weight Bearing Per Order NWB   Other Precautions Cognitive;THR;Multiple lines; Fall Risk;Pain   Home Living   Type of Home SNF  ([facility])   Home Layout One level;Ramped entrance;Elevator   [address] [facility]   Prior Function   Level of [place] Needs assistance with ADLs; Needs assistance with functional mobility; Needs assistance with IADLS; Total dependent   Lives With Facility staff   Receives Help From Personal care attendant   IADLs Family/Friend/Other provides transportation; Family/Friend/Other provides meals; Family/Friend/Other provides medication management   Falls in the last 6 months 1 to 4  (1 recent)   Comments pt reports using andree lift for OOB to w/c  has fallen out of chair, pt needed assist for propel w/c, all care   General   Family/Caregiver Present No   Cognition   Overall Cognitive Status Impaired   Orientation Level Oriented to person;Oriented to place;Oriented to time   Subjective   Subjective still h as a lot of pain in r AKA stump   RUE Assessment   RUE Assessment WFL   LUE Assessment   LUE Assessment   (altered tone, coordination, proprioception, limited elevation, dystonia)   RLE Assessment   RLE Assessment X  (AKA, bandage on distal appendage  HF <2/5)   LLE Assessment   LLE Assessment X  (high aka, no rom observed)   Coordination   Movements are Fluid and Coordinated 0   Coordination and Movement Description lue rom impaired   Sensation X  (lue proprioception   le's not tested)   Finger to Nose & Finger to Finger  Impaired  (lue)   Bed Mobility   Rolling R 2  Maximal assistance   Additional items Assist x 2; Increased time required;Verbal cues   Rolling L 2  Maximal assistance   Additional items Assist x 2; Increased time required;Verbal cues   Supine to Sit 1  Dependent   Additional items Assist x 2; Increased time required;Verbal cues  (pull to long sit, only able to achieve about 30* supine to sit)   Sit to Supine 1  Dependent   Balance   Static Sitting Zero   Endurance Deficit   Endurance Deficit Yes   Endurance Deficit Description r stump pain, weakness, fatigue   Activity Tolerance   Activity Tolerance Treatment limited secondary to medical complications (Comment)   Medical Staff Made Aware OT   Nurse Made Aware yes   Assessment   Prognosis Guarded   Problem List Decreased strength;Decreased range of motion;Decreased endurance; Impaired balance;Decreased mobility; Decreased coordination;Decreased cognition; Impaired sensation; Impaired vision; Impaired tone;Obesity; Decreased skin integrity;Orthopedic restrictions;Pain   Assessment pt is resident of snf, admitted on 1/17 with sepsis, uti, hypomagnesmia and stump wound  pt sustained cardiac arrest on 1/18/23 and has had protracted hospital course, please see progress notes  pt now referred to PT  pt was living in [facility], using andree to get OOB to w/c  pt needed assist for all adl's and mobility including assist to propel w/c  pt has had a recent fall sustaining facial contusion  pt demonstrated severe functional limitations  pt reported pain in R AKA limb and fatigue  pt needed max assist of 2 persons for rolling, dependent for supine to long sit with assist of 2 reaching only about 30* elevation from the bed  pt was unable to sustain sitting  pt has current deficits in cognition, rom, str, coordination lue, sensation lue, pain control, balance, locomotion, limb integrity, skin integrity, activity tolerance due to pain   pt will need skilled PT to achieve goals of upright sitting for eventuqal OOB to chair via mechanical means  recommend return to snf with PT   Barriers to Discharge None   Goals   Patient Goals less pain rle   STG Expiration Date 02/09/23   Short Term Goal #1 bed moiblity with mod assist of 2 for rolling, achieve full upright sitting with mod assist of 2  sitting upright for 10-15 minutes  improve strength by 1/2 grade and balance by 1-2 grades  activity tolerance 45 minutes  OOB to recliner with assist of andree and 2 persons  Plan   Treatment/Interventions Therapeutic exercise; Endurance training;Patient/family training;Equipment eval/education; Bed mobility; Compensatory technique education;Spoke to nursing;OT   PT Frequency 2-3x/wk   Recommendation   PT Discharge Recommendation Return to facility with rehabilitation services   Equipment Recommended Wheelchair   AM-PAC Basic Mobility Inpatient   Turning in Flat Bed Without Bedrails 1   Lying on Back to Sitting on Edge of Flat Bed Without Bedrails 1   Moving Bed to Chair 1   Standing Up From Chair Using Arms 1   Walk in Room 1   Climb 3-5 Stairs With Railing 1   Basic Mobility Inpatient Raw Score 6   Turning Head Towards Sound 4   Follow Simple Instructions 3   Low Function Basic Mobility Raw Score 13   Low Function Basic Mobility Standardized Score 20 14   Highest Level Of Mobility   -HLM Goal 2: Bed activities/Dependent transfer   -HL Achieved 2: Bed activities/Dependent transfer       An AM-PAC Basic Mobility standardized score less than 42 9 suggests the patient may benefit from discharge to post-acute rehab services      History: co - morbidities, fall risk, use of assistive device, assist for adl's, cognition, multiple lines  Exam: impairments in locomotion, musculoskeletal, balance,posture, joint integrity, skin integrity,  cognition   Clinical: unstable/unpredictable- medical status, falls  Complexity:high  Mike Comment, PT

## 2023-02-02 NOTE — PLAN OF CARE
Problem: PHYSICAL THERAPY ADULT  Goal: Performs mobility at highest level of function for planned discharge setting  See evaluation for individualized goals  Description: Treatment/Interventions: Therapeutic exercise, Endurance training, Patient/family training, Equipment eval/education, Bed mobility, Compensatory technique education, Spoke to nursing, OT  Equipment Recommended: Wheelchair       See flowsheet documentation for full assessment, interventions and recommendations  Note: Prognosis: Guarded  Problem List: Decreased strength, Decreased range of motion, Decreased endurance, Impaired balance, Decreased mobility, Decreased coordination, Decreased cognition, Impaired sensation, Impaired vision, Impaired tone, Obesity, Decreased skin integrity, Orthopedic restrictions, Pain  Assessment: pt is resident of snf, admitted on 1/17 with sepsis, uti, hypomagnesmia and stump wound  pt sustained cardiac arrest on 1/18/23 and has had protracted hospital course, please see progress notes  pt now referred to PT  pt was living in [facility], using andree to get OOB to w/c  pt needed assist for all adl's and mobility including assist to propel w/c  pt has had a recent fall sustaining facial contusion  pt demonstrated severe functional limitations  pt reported pain in R AKA limb and fatigue  pt needed max assist of 2 persons for rolling, dependent for supine to long sit with assist of 2 reaching only about 30* elevation from the bed  pt was unable to sustain sitting  pt has current deficits in cognition, rom, str, coordination lue, sensation lue, pain control, balance, locomotion, limb integrity, skin integrity, activity tolerance due to pain  pt will need skilled PT to achieve goals of upright sitting for eventuqal OOB to chair via mechanical means   recommend return to snf with PT  Barriers to Discharge: None     PT Discharge Recommendation: Return to facility with rehabilitation services    See flowsheet documentation for full assessment

## 2023-02-02 NOTE — OCCUPATIONAL THERAPY NOTE
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 (HCC)    Hypokalemia    PAD (peripheral artery disease) (HCC)    Anemia    HIT (heparin-induced thrombocytopenia)    History of CVA (cerebrovascular accident)    Fall    Cardiac arrest ([facility] )    Troponin level elevated    Hypomagnesemia    Bacteremia    Forehead laceration    Osteomyelitis (HCC)    Altered mental status    Past Medical History  Past Medical History:   Diagnosis Date    Anxiety     Depression     Diabetes ([facility] )     Diabetes mellitus ([facility] )     Esophageal reflux     28 APR 2015 RESOLVED    Hyperlipidemia     Hypertension     Low back pain     sciatica    Pneumonia 2019    [facility] 12/2015    small vessel, L frontal corona radiata   Rx asa 81, Lipitor 40, risk modification    Vitamin D deficiency      Past Surgical History  Past Surgical History:   Procedure Laterality Date    AMPUTATION ABOVE KNEE (AKA) Right 12/1/2022    Procedure: (AKA), PSB  BKA;  Surgeon: Reginald Brittle, DO;  [facility];  Service: Vascular    ARTERIOGRAM Right 11/7/2022    Procedure: -Right lower extremity angiogram -Right CFA balloon angioplasty with [id number]  [employer]  -Right CFA DCB with 6x40 Bard Lutonix -Right CFA atherectomy with Jetstream and distal embolization protection with 7mm Spider FX -Right SFA/Pop angioplasty with 4x40 Chololate balloon -Right SFA/Pop DCB with 5x150 Bard Lutonix -Right SFA stent with 6x80 W W  [employer] x2 -R    COLONOSCOPY      IR AORTAGRAM WITH RUN-OFF  10/31/2022    IR LOWER EXTREMITY ANGIOGRAM  11/10/2022    IR LOWER EXTREMITY ANGIOGRAM  11/7/2022    LA AMP LEG THRU TIBIA&FIBULA SEC CLOSURE/SCAR REV Right 1/27/2023    Procedure: AMPUTATION REVISION STUMP;  Surgeon: Sondra Pacheco DO;  [facility];  Service: Vascular    OR AMPUTATION THIGH THROUGH FEMUR ANY LEVEL Left 11/23/2022    Procedure: (AKA); Surgeon: Therese Sanz DO;  [facility];  Service: Vascular    OR NEGATIVE PRESSURE WOUND THERAPY DME </= 50 SQ CM Bilateral 12/19/2022    Procedure: Right above knee amputation washout; vac change; Left above knee amputation debridement; vac placement;  Surgeon: Sondra Pacheco DO;  [facility];  Service: Vascular    WOUND DEBRIDEMENT Right 12/16/2022    Procedure: AKA STUMP WASHOUT, Left AKA Staple removal  application of wound vac to Right AKA;   Surgeon: Eric Casey MD;  [facility];  Service: Vascular           02/02/23 1410   Note Type   Note type Evaluation   Pain Assessment   Pain Assessment Tool FLACC   Pain Rating: FLACC (Rest) - Face 0   Pain Rating: FLACC (Rest) - Legs 0   Pain Rating: FLACC (Rest) - Activity 0   Pain Rating: FLACC (Rest) - Cry 1   Pain Rating: FLACC (Rest) - Consolability 0   Score: FLACC (Rest) 1   Restrictions/Precautions   Weight Bearing Precautions Per Order Yes   RLE Weight Bearing Per Order NWB  (assumed 2* AKA's)   LLE Weight Bearing Per Order NWB   Home Living   Type of Home SNF  ([facility])   Prior Function   Lives With Facility staff   Comments PTA pt states that she had assistance with her ADLs; hoyerlift for OOB; able to assist with w/c mobility; +falls=1(at SNF)   Lifestyle   Autonomy PTA pt states that she had assistance with her ADLs; hoyerlift for OOB; able to assist with w/c mobility; +falls=1(at SNF)   Reciprocal Relationships dtr, brother, ACE   Service to Others worked for [employer] and for [employer]   Subjective   Subjective "Is this [place]?"   ADL   Where Assessed Supine, bed   Eating Assistance 5  Supervision/Setup Grooming Assistance 4  Minimal Assistance   UB Bathing Assistance 3  Moderate Assistance   LB Bathing Assistance 2  Maximal Assistance   UB Dressing Assistance 3  Moderate Assistance   LB Dressing Assistance 2  Maximal Assistance   Toileting Assistance  1  Total Assistance   Bed Mobility   Rolling R 2  Maximal assistance   Additional items Assist x 1; Increased time required;Verbal cues;LE management   Rolling L 2  Maximal assistance   Additional items Assist x 1; Increased time required;Verbal cues;LE management   Supine to Sit 1  Dependent   Additional items Assist x 2   Transfers   Sit to Stand   (N/A)   Functional Mobility   Functional Mobility   (recommend [facility] for OOB with nsg)   Balance   Static Sitting Poor -   Dynamic Sitting Poor -   Activity Tolerance   Activity Tolerance Patient limited by fatigue;Patient limited by pain   Medical Staff Made Aware nsg, P T , CM   RUE Assessment   RUE Assessment WFL   RUE Strength   RUE Overall Strength Within Functional Limits - able to perform ADL tasks with strength  (3+/5 throughout)   LUE Assessment   LUE Assessment WFL   LUE Strength   LUE Overall Strength Within Functional Limits - able to perform ADL tasks with strength  (shr=3/5, elbow-distal=3+/5)   Hand Function   Gross Motor Coordination Functional   Fine Motor Coordination Functional   Sensation   Light Touch No apparent deficits   Proprioception   Proprioception No apparent deficits   Vision-Basic Assessment   Current Vision   (no glasses)   Vision - Complex Assessment   Acuity   (impaired)   Psychosocial   Psychosocial (WDL) X   Patient Behaviors/Mood Flat affect; Cooperative   Perception   Inattention/Neglect Cues to attend to left side of body;Cues to attend left visual field   Cognition   Overall Cognitive Status Impaired   Arousal/Participation Alert   Attention Attends with cues to redirect   Orientation Level Oriented to person;Oriented to place;Oriented to time   Memory Decreased recall of precautions;Decreased short term memory   Following Commands Follows one step commands with increased time or repetition   Assessment   Limitation Decreased ADL status; Decreased UE strength;Decreased Safe judgement during ADL;Decreased cognition;Decreased endurance;Decreased high-level ADLs   Prognosis Fair   Assessment Pt is a 64y/o female admitted to the hospital after a fall from her wheelchair with facial trauma; x-ray/CT=neg acute findings; L LE= nondisplaced fracture of the osseous stump with evidence of healing  Pt noted with sepsis 2* UTI, HTN  During this admission, pt had a cardiac arrest  Pt with PMH uncontrolled diabetes mellitus, severe peripheral artery disease with recent(11/22) bilateral above-the-knee amputations (the admission was complicated by fungal infection of the Perclose site, urinary tract infection, heparin-induced thrombocytopenia and stroke), LBP, anxiety  PTA pt states that she had assistance with her ADLs; hoyerlift for OOB; able to assist with w/c mobility; +falls=1(at SNF)  During initial eval, pt demonstrated deficits with her functional balance, functional mobility, ADL status, b/l UE strength, activity tolerance(currently fair=15-20mins), and cognition(i e problem-solving, memory)  Pt would benefit from continued OT tx for the above deficits  2-3xwk/1-2wks  The patient's raw score on the AM-PAC Daily Activity Inpatient Short Form is 14  A raw score of less than 19 suggests the patient may benefit from discharge to post-acute rehabilitation services  Please refer to the recommendation of the Occupational Therapist for safe discharge planning  Goals   Patient Goals "to get better"   STG Time Frame   (1-7 days)   Short Term Goal #1 Pt will demonstrate modA with their bed mobility to facilitate EOB ADLs  Short Term Goal #2 Pt will demonstrate improved activity tolerance to good(20-30mins) and sitting tolerance(on EOB) to 3-5mins to assist with ADLs     Short Term Goal  Pt will tolerate continued cognitive/home-safety assessment and appropriate d/c recommendations will be provided  LTG Time Frame   (7-14 days)   Long Term Goal #1 Pt will demonstrate improved functional balance by 1 grade to assist with ADLs/transfers  Long Term Goal #2 Pt will demonstrate supervision with her UE and Lillian with her LE bathing/dressing  Long Term Goal Pt will demonstrate improved b/l UE strength by 1/2 MM grade to assist with ADLs/transfers  Plan   Treatment Interventions ADL retraining;Functional transfer training;UE strengthening/ROM; Endurance training;Cognitive reorientation;Patient/family training;Equipment evaluation/education; Compensatory technique education;Continued evaluation   Goal Expiration Date 02/16/23   OT Treatment Day 0   OT Frequency 2-3x/wk   Recommendation   OT Discharge Recommendation Return to facility with rehabilitation services   AM-PAC Daily Activity Inpatient   Lower Body Dressing 2   Bathing 2   Toileting 2   Upper Body Dressing 2   Grooming 3   Eating 3   Daily Activity Raw Score 14   Daily Activity Standardized Score (Calc for Raw Score >=11) 33 39   AM-PAC Applied Cognition Inpatient   Following a Speech/Presentation 3   Understanding Ordinary Conversation 3   Taking Medications 2   Remembering Where Things Are Placed or Put Away 3   Remembering List of 4-5 Errands 3   Taking Care of Complicated Tasks 2   Applied Cognition Raw Score 16   Applied Cognition Standardized Score 35 03

## 2023-02-02 NOTE — ASSESSMENT & PLAN NOTE
History of HIT   On warfarin  Goal INR 2-3    Decreased dose back to 3mg from 5mg previously, monitor and increase as needed    Recent Labs     01/31/23  0612 02/01/23  0509   INR 2 32* 2 01*

## 2023-02-02 NOTE — CASE MANAGEMENT
Karli Baum 51 received request for authorization from Care Manager    Authorization request for: SNF  Facility Name: [facility]  NPI: [id number]  Facility MD:  Maurizio Rivera   NPI: [id number]  Authorization initiated by contacting insurance: [facility] Via: [place] [facility] [place] [facility]  Pending Reference #: [id number]   Clinicals submitted via: Janet Villarreal

## 2023-02-02 NOTE — ASSESSMENT & PLAN NOTE
She presented with altered mental status, generalized weakness, and fall secondary to sepsis  Etiology secondary to complicated UTI , polymicrobial bacteremia, and 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 further worsened by her cardiac arrest  She is S/p right amputation revision stump  · Antibiotic management per infectious disease  Currently on Augmentin and doxycycline for osteo through 3/1

## 2023-02-03 ENCOUNTER — TELEPHONE (OUTPATIENT)
Dept: OTHER | Facility: OTHER | Age: 63
End: 2023-02-03

## 2023-02-03 ENCOUNTER — APPOINTMENT (INPATIENT)
Dept: NON INVASIVE DIAGNOSTICS | Facility: HOSPITAL | Age: 63
End: 2023-02-03

## 2023-02-03 ENCOUNTER — NURSING HOME VISIT (OUTPATIENT)
Dept: GERIATRICS | Facility: OTHER | Age: 63
End: 2023-02-03

## 2023-02-03 VITALS
HEART RATE: 79 BPM | HEIGHT: 64 IN | SYSTOLIC BLOOD PRESSURE: 166 MMHG | BODY MASS INDEX: 27.14 KG/M2 | TEMPERATURE: 98 F | OXYGEN SATURATION: 96 % | WEIGHT: 159 LBS | RESPIRATION RATE: 19 BRPM | DIASTOLIC BLOOD PRESSURE: 79 MMHG

## 2023-02-03 DIAGNOSIS — R94.31 PROLONGED Q-T INTERVAL ON ECG: ICD-10-CM

## 2023-02-03 DIAGNOSIS — I10 PRIMARY HYPERTENSION: ICD-10-CM

## 2023-02-03 DIAGNOSIS — D75.829 HIT (HEPARIN-INDUCED THROMBOCYTOPENIA): Chronic | ICD-10-CM

## 2023-02-03 DIAGNOSIS — I73.9 PAD (PERIPHERAL ARTERY DISEASE) (HCC): Chronic | ICD-10-CM

## 2023-02-03 DIAGNOSIS — B95.2 UTI (URINARY TRACT INFECTION) DUE TO ENTEROCOCCUS: ICD-10-CM

## 2023-02-03 DIAGNOSIS — I63.9 CEREBROVASCULAR ACCIDENT (CVA), UNSPECIFIED MECHANISM (HCC): ICD-10-CM

## 2023-02-03 DIAGNOSIS — E11.51 DM (DIABETES MELLITUS) TYPE II, CONTROLLED, WITH PERIPHERAL VASCULAR DISORDER (HCC): ICD-10-CM

## 2023-02-03 DIAGNOSIS — N39.0 UTI (URINARY TRACT INFECTION) DUE TO ENTEROCOCCUS: ICD-10-CM

## 2023-02-03 DIAGNOSIS — R78.81 BACTEREMIA: ICD-10-CM
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
## 2023-02-03 DIAGNOSIS — E83.42 HYPOMAGNESEMIA: ICD-10-CM

## 2023-02-03 DIAGNOSIS — M86.151 OTHER ACUTE OSTEOMYELITIS OF RIGHT FEMUR (HCC): Primary | ICD-10-CM

## 2023-02-03 DIAGNOSIS — I46.9 CARDIAC ARREST (HCC): ICD-10-CM

## 2023-02-03 DIAGNOSIS — D64.9 ANEMIA, UNSPECIFIED TYPE: ICD-10-CM

## 2023-02-03 DIAGNOSIS — F41.9 ANXIETY: ICD-10-CM

## 2023-02-03 LAB
ALBUMIN SERPL BCP-MCNC: 2.4 G/DL (ref 3.5–5)
ALP SERPL-CCNC: 202 U/L (ref 34–104)
ALT SERPL W P-5'-P-CCNC: 10 U/L (ref 7–52)
ANION GAP SERPL CALCULATED.3IONS-SCNC: 7 MMOL/L (ref 4–13)
AST SERPL W P-5'-P-CCNC: 13 U/L (ref 13–39)
BASOPHILS # BLD AUTO: 0.16 THOUSANDS/ÂΜL (ref 0–0.1)
BASOPHILS NFR BLD AUTO: 1 % (ref 0–1)
BILIRUB SERPL-MCNC: 0.27 MG/DL (ref 0.2–1)
BUN SERPL-MCNC: 10 MG/DL (ref 5–25)
CALCIUM ALBUM COR SERPL-MCNC: 9.3 MG/DL (ref 8.3–10.1)
CALCIUM SERPL-MCNC: 8 MG/DL (ref 8.4–10.2)
CHLORIDE SERPL-SCNC: 105 MMOL/L (ref 96–108)
CO2 SERPL-SCNC: 25 MMOL/L (ref 21–32)
CREAT SERPL-MCNC: 0.6 MG/DL (ref 0.6–1.3)
EOSINOPHIL # BLD AUTO: 2.64 THOUSAND/ÂΜL (ref 0–0.61)
EOSINOPHIL NFR BLD AUTO: 18 % (ref 0–6)
ERYTHROCYTE [DISTWIDTH] IN BLOOD BY AUTOMATED COUNT: 16.7 % (ref 11.6–15.1)
GFR SERPL CREATININE-BSD FRML MDRD: 98 ML/MIN/1.73SQ M
GLUCOSE SERPL-MCNC: 124 MG/DL (ref 65–140)
GLUCOSE SERPL-MCNC: 90 MG/DL (ref 65–140)
GLUCOSE SERPL-MCNC: 92 MG/DL (ref 65–140)
HCT VFR BLD AUTO: 26.9 % (ref 34.8–46.1)
HGB BLD-MCNC: 8.4 G/DL (ref 11.5–15.4)
IMM GRANULOCYTES # BLD AUTO: 0.14 THOUSAND/UL (ref 0–0.2)
IMM GRANULOCYTES NFR BLD AUTO: 1 % (ref 0–2)
INR PPP: 1.83 (ref 0.84–1.19)
LYMPHOCYTES # BLD AUTO: 1.99 THOUSANDS/ÂΜL (ref 0.6–4.47)
LYMPHOCYTES NFR BLD AUTO: 13 % (ref 14–44)
MAGNESIUM SERPL-MCNC: 1.9 MG/DL (ref 1.9–2.7)
MCH RBC QN AUTO: 27.5 PG (ref 26.8–34.3)
MCHC RBC AUTO-ENTMCNC: 31.2 G/DL (ref 31.4–37.4)
MCV RBC AUTO: 88 FL (ref 82–98)
MONOCYTES # BLD AUTO: 0.78 THOUSAND/ÂΜL (ref 0.17–1.22)
MONOCYTES NFR BLD AUTO: 5 % (ref 4–12)
NEUTROPHILS # BLD AUTO: 9.34 THOUSANDS/ÂΜL (ref 1.85–7.62)
NEUTS SEG NFR BLD AUTO: 62 % (ref 43–75)
NRBC BLD AUTO-RTO: 0 /100 WBCS
PHOSPHATE SERPL-MCNC: 3.9 MG/DL (ref 2.3–4.1)
PLATELET # BLD AUTO: 394 THOUSANDS/UL (ref 149–390)
PMV BLD AUTO: 7.8 FL (ref 8.9–12.7)
POTASSIUM SERPL-SCNC: 4 MMOL/L (ref 3.5–5.3)
PROT SERPL-MCNC: 5.7 G/DL (ref 6.4–8.4)
PROTHROMBIN TIME: 21.1 SECONDS (ref 11.6–14.5)
RBC # BLD AUTO: 3.06 MILLION/UL (ref 3.81–5.12)
SODIUM SERPL-SCNC: 137 MMOL/L (ref 135–147)
WBC # BLD AUTO: 15.05 THOUSAND/UL (ref 4.31–10.16)

## 2023-02-03 RX ORDER — AMIODARONE HYDROCHLORIDE 200 MG/1
200 TABLET ORAL DAILY
Start: 2023-02-03

## 2023-02-03 RX ORDER — HYDRALAZINE HYDROCHLORIDE 100 MG/1
100 TABLET, FILM COATED ORAL EVERY 8 HOURS SCHEDULED
Refills: 0
Start: 2023-02-03

## 2023-02-03 RX ORDER — MAGNESIUM CHLORIDE 64 MG
64 TABLET, DELAYED RELEASE (ENTERIC COATED) ORAL DAILY
Refills: 0
Start: 2023-02-04

## 2023-02-03 RX ORDER — WARFARIN SODIUM 5 MG/1
5 TABLET ORAL
Status: DISCONTINUED | OUTPATIENT
Start: 2023-02-03 | End: 2023-02-03

## 2023-02-03 RX ORDER — INSULIN GLARGINE 100 [IU]/ML
20 INJECTION, SOLUTION SUBCUTANEOUS EVERY MORNING
Qty: 10 ML | Refills: 0
Start: 2023-02-04

## 2023-02-03 RX ORDER — DOXYCYCLINE HYCLATE 100 MG/1
100 CAPSULE ORAL EVERY 12 HOURS SCHEDULED
Qty: 60 CAPSULE | Refills: 0
Start: 2023-02-03 | End: 2023-03-10

## 2023-02-03 RX ORDER — WARFARIN SODIUM 2 MG/1
4 TABLET ORAL
Status: DISCONTINUED | OUTPATIENT
Start: 2023-02-03 | End: 2023-02-03 | Stop reason: HOSPADM

## 2023-02-03 RX ORDER — METOPROLOL TARTRATE 50 MG/1
50 TABLET, FILM COATED ORAL EVERY 12 HOURS SCHEDULED
Refills: 0
Start: 2023-02-03

## 2023-02-03 RX ORDER — WARFARIN SODIUM 4 MG/1
4 TABLET ORAL
Start: 2023-02-03

## 2023-02-03 RX ORDER — THIAMINE MONONITRATE (VIT B1) 100 MG
100 TABLET ORAL DAILY
Refills: 0
Start: 2023-02-04

## 2023-02-03 RX ORDER — HYDROCHLOROTHIAZIDE 12.5 MG/1
12.5 TABLET ORAL DAILY
Refills: 0
Start: 2023-02-04

## 2023-02-03 RX ORDER — OXYCODONE HYDROCHLORIDE 5 MG/1
5 TABLET ORAL EVERY 4 HOURS PRN
Refills: 0 | Status: SHIPPED | OUTPATIENT
Start: 2023-02-03 | End: 2023-02-13

## 2023-02-03 RX ORDER — AMOXICILLIN AND CLAVULANATE POTASSIUM 875; 125 MG/1; MG/1
1 TABLET, FILM COATED ORAL EVERY 12 HOURS SCHEDULED
Qty: 60 TABLET | Refills: 0
Start: 2023-02-03 | End: 2023-03-10

## 2023-02-03 RX ADMIN — THIAMINE HCL TAB 100 MG 100 MG: 100 TAB at 07:53

## 2023-02-03 RX ADMIN — BUPROPION HYDROCHLORIDE 150 MG: 150 TABLET, FILM COATED, EXTENDED RELEASE ORAL at 07:52

## 2023-02-03 RX ADMIN — MAGNESIUM 64 MG (MAGNESIUM CHLORIDE) TABLET,DELAYED RELEASE 64 MG: at 08:00

## 2023-02-03 RX ADMIN — CLOPIDOGREL BISULFATE 75 MG: 75 TABLET ORAL at 07:53

## 2023-02-03 RX ADMIN — HYDRALAZINE HYDROCHLORIDE 100 MG: 50 TABLET, FILM COATED ORAL at 05:28

## 2023-02-03 RX ADMIN — AMIODARONE HYDROCHLORIDE 400 MG: 200 TABLET ORAL at 07:52

## 2023-02-03 RX ADMIN — SERTRALINE HYDROCHLORIDE 100 MG: 100 TABLET, FILM COATED ORAL at 07:52

## 2023-02-03 RX ADMIN — AMLODIPINE BESYLATE 10 MG: 10 TABLET ORAL at 07:53

## 2023-02-03 RX ADMIN — LISINOPRIL 40 MG: 20 TABLET ORAL at 07:52

## 2023-02-03 RX ADMIN — INSULIN GLARGINE 20 UNITS: 100 INJECTION, SOLUTION SUBCUTANEOUS at 07:53

## 2023-02-03 RX ADMIN — METOPROLOL TARTRATE 50 MG: 50 TABLET, FILM COATED ORAL at 07:52

## 2023-02-03 RX ADMIN — DOXYCYCLINE 100 MG: 100 CAPSULE ORAL at 07:53

## 2023-02-03 RX ADMIN — OXYCODONE 5 MG: 5 TABLET ORAL at 05:27

## 2023-02-03 RX ADMIN — AMOXICILLIN AND CLAVULANATE POTASSIUM 1 TABLET: 875; 125 TABLET, FILM COATED ORAL at 07:53

## 2023-02-03 RX ADMIN — HYDROCHLOROTHIAZIDE 12.5 MG: 12.5 TABLET ORAL at 07:52

## 2023-02-03 RX ADMIN — OXYCODONE 5 MG: 5 TABLET ORAL at 11:41

## 2023-02-03 RX ADMIN — PANTOPRAZOLE SODIUM 40 MG: 40 TABLET, DELAYED RELEASE ORAL at 05:27

## 2023-02-03 NOTE — PLAN OF CARE
Problem: MOBILITY - ADULT  Goal: Maintain or return to baseline ADL function  Description: INTERVENTIONS:  -  Assess patient's ability to carry out ADLs; assess patient's baseline for ADL function and identify physical deficits which impact ability to perform ADLs (bathing, care of mouth/teeth, toileting, grooming, dressing, etc )  - Assess/evaluate cause of self-care deficits   - Assess range of motion  - Assess patient's mobility; develop plan if impaired  - Assess patient's need for assistive devices and provide as appropriate  - Encourage maximum independence but intervene and supervise when necessary  - Involve family in performance of ADLs  - Assess for home care needs following discharge   - Consider OT consult to assist with ADL evaluation and planning for discharge  - Provide patient education as appropriate  2/3/2023 1242 by Aishwarya Slater RN  Outcome: Adequate for Discharge  2/3/2023 1043 by Aishwarya Slater RN  Outcome: Progressing  Goal: Maintains/Returns to pre admission functional level  Description: INTERVENTIONS:  - Perform BMAT or MOVE assessment daily    - Set and communicate daily mobility goal to care team and patient/family/caregiver  - Collaborate with rehabilitation services on mobility goals if consulted  - Perform Range of Motion 4 times a day  - Reposition patient every 2 hours    - Dangle patient 4 times a day  - Stand patient 4 times a day  - Ambulate patient 4 times a day  - Out of bed to chair 4 times a day   - Out of bed for meals 4 times a day  - Out of bed for toileting  - Record patient progress and toleration of activity level   2/3/2023 1242 by Aishwarya Slater RN  Outcome: Adequate for Discharge  2/3/2023 1043 by Aishwarya Slater RN  Outcome: Progressing     Problem: Prexisting or High Potential for Compromised Skin Integrity  Goal: Skin integrity is maintained or improved  Description: INTERVENTIONS:  - Identify patients at risk for skin breakdown  - Assess and monitor skin integrity  - Assess and monitor nutrition and hydration status  - Monitor labs   - Assess for incontinence   - Turn and reposition patient  - Assist with mobility/ambulation  - Relieve pressure over bony prominences  - Avoid friction and shearing  - Provide appropriate hygiene as needed including keeping skin clean and dry  - Evaluate need for skin moisturizer/barrier cream  - Collaborate with interdisciplinary team   - Patient/family teaching  - Consider wound care consult   2/3/2023 1242 by Lily Frias RN  Outcome: Adequate for Discharge  2/3/2023 1043 by Lily Frias RN  Outcome: Progressing     Problem: PAIN - ADULT  Goal: Verbalizes/displays adequate comfort level or baseline comfort level  Description: Interventions:  - Encourage patient to monitor pain and request assistance  - Assess pain using appropriate pain scale  - Administer analgesics based on type and severity of pain and evaluate response  - Implement non-pharmacological measures as appropriate and evaluate response  - Consider cultural and social influences on pain and pain management  - Notify physician/advanced practitioner if interventions unsuccessful or patient reports new pain  2/3/2023 1242 by Lily Frias RN  Outcome: Adequate for Discharge  2/3/2023 1043 by Lily Frias RN  Outcome: Progressing     Problem: INFECTION - ADULT  Goal: Absence or prevention of progression during hospitalization  Description: INTERVENTIONS:  - Assess and monitor for signs and symptoms of infection  - Monitor lab/diagnostic results  - Monitor all insertion sites, i e  indwelling lines, tubes, and drains  - Monitor endotracheal if appropriate and nasal secretions for changes in amount and color  - [place] appropriate cooling/warming therapies per order  - Administer medications as ordered  - Instruct and encourage patient and family to use good hand hygiene technique  - Identify and instruct in appropriate isolation precautions for identified infection/condition  2/3/2023 1242 by Nicolás Zaragoza RN  Outcome: Adequate for Discharge  2/3/2023 1043 by Nicolás Zaragoza RN  Outcome: Progressing     Problem: SAFETY ADULT  Goal: Maintain or return to baseline ADL function  Description: INTERVENTIONS:  -  Assess patient's ability to carry out ADLs; assess patient's baseline for ADL function and identify physical deficits which impact ability to perform ADLs (bathing, care of mouth/teeth, toileting, grooming, dressing, etc )  - Assess/evaluate cause of self-care deficits   - Assess range of motion  - Assess patient's mobility; develop plan if impaired  - Assess patient's need for assistive devices and provide as appropriate  - Encourage maximum independence but intervene and supervise when necessary  - Involve family in performance of ADLs  - Assess for home care needs following discharge   - Consider OT consult to assist with ADL evaluation and planning for discharge  - Provide patient education as appropriate  2/3/2023 1242 by Nicolás Zaragoza RN  Outcome: Adequate for Discharge  2/3/2023 1043 by Nicolás Zaragoza RN  Outcome: Progressing  Goal: Maintains/Returns to pre admission functional level  Description: INTERVENTIONS:  - Perform BMAT or MOVE assessment daily    - Set and communicate daily mobility goal to care team and patient/family/caregiver  - Collaborate with rehabilitation services on mobility goals if consulted  - Perform Range of Motion 4 times a day  - Reposition patient every 2 hours    - Dangle patient 4 times a day  - Stand patient 4 times a day  - Ambulate patient 4 times a day  - Out of bed to chair 4 times a day   - Out of bed for meals 4 times a day  - Out of bed for toileting  - Record patient progress and toleration of activity level   2/3/2023 1242 by Nicolás Zaragoza RN  Outcome: Adequate for Discharge  2/3/2023 1043 by Nicolás Zaragoza RN  Outcome: Progressing  Goal: Patient will remain free of falls  Description: INTERVENTIONS:  - Educate patient/family on patient safety including physical limitations  - Instruct patient to call for assistance with activity   - Consult OT/PT to assist with strengthening/mobility   - Keep Call bell within reach  - Keep bed low and locked with side rails adjusted as appropriate  - Keep care items and personal belongings within reach  - Initiate and maintain comfort rounds  - Make Fall Risk Sign visible to staff  - Apply yellow socks and bracelet for high fall risk patients  - Consider moving patient to room near nurses station  2/3/2023 1242 by Damian Evans RN  Outcome: Adequate for Discharge  2/3/2023 1043 by Damian Evans RN  Outcome: Progressing     Problem: DISCHARGE PLANNING  Goal: Discharge to home or other facility with appropriate resources  Description: INTERVENTIONS:  - Identify barriers to discharge w/patient and caregiver  - Arrange for needed discharge resources and transportation as appropriate  - Identify discharge learning needs (meds, wound care, etc )  - Arrange for interpretive services to assist at discharge as needed  - Refer to Case Management Department for coordinating discharge planning if the patient needs post-hospital services based on physician/advanced practitioner order or complex needs related to functional status, cognitive ability, or social support system  2/3/2023 1242 by Damian Evans RN  Outcome: Adequate for Discharge  2/3/2023 1043 by Damian Evans RN  Outcome: Progressing     Problem: Knowledge Deficit  Goal: Patient/family/caregiver demonstrates understanding of disease process, treatment plan, medications, and discharge instructions  Description: Complete learning assessment and assess knowledge base    Interventions:  - Provide teaching at level of understanding  - Provide teaching via preferred learning methods  2/3/2023 1242 by Damian Evans RN  Outcome: Adequate for Discharge  2/3/2023 1043 by Damian Evans RN  Outcome: Progressing     Problem: Nutrition/Hydration-ADULT  Goal: Nutrient/Hydration intake appropriate for improving, restoring or maintaining nutritional needs  Description: Monitor and assess patient's nutrition/hydration status for malnutrition  Collaborate with interdisciplinary team and initiate plan and interventions as ordered  Monitor patient's weight and dietary intake as ordered or per policy  Utilize nutrition screening tool and intervene as necessary  Determine patient's food preferences and provide high-protein, high-caloric foods as appropriate       INTERVENTIONS:  - Monitor oral intake, urinary output, labs, and treatment plans  - Assess nutrition and hydration status and recommend course of action  - Evaluate amount of meals eaten  - Assist patient with eating if necessary   - Allow adequate time for meals  - Recommend/ encourage appropriate diets, oral nutritional supplements, and vitamin/mineral supplements  - Order, calculate, and assess calorie counts as needed  - Recommend, monitor, and adjust tube feedings and TPN/PPN based on assessed needs  - Assess need for intravenous fluids  - Provide specific nutrition/hydration education as appropriate  - Include patient/family/caregiver in decisions related to nutrition  2/3/2023 1242 by Phoenix Castro RN  Outcome: Adequate for Discharge  2/3/2023 1043 by Phoenix Castro RN  Outcome: Progressing     Problem: COPING  Goal: Pt/Family able to verbalize concerns and demonstrate effective coping strategies  Description: INTERVENTIONS:  - Assist patient/family to identify coping skills, available support systems and cultural and spiritual values  - Provide emotional support, including active listening and acknowledgement of concerns of patient and caregivers  - Reduce environmental stimuli, as able  - Provide patient education  - Assess for spiritual pain/suffering and initiate spiritual care, including notification of Pastoral Care or chidi based community as needed  - Assess effectiveness of coping strategies  2/3/2023 1242 by Phoenix Castro RN  Outcome: Adequate for Discharge  2/3/2023 1043 by Aishwarya Slater RN  Outcome: Progressing  Goal: Will report anxiety at manageable levels  Description: INTERVENTIONS:  - Administer medication as ordered  - Teach and encourage coping skills  - Provide emotional support  - Assess patient/family for anxiety and ability to cope  2/3/2023 1242 by Aishwarya Slater RN  Outcome: Adequate for Discharge  2/3/2023 1043 by Aishwarya Slater RN  Outcome: Progressing     Problem: SKIN/TISSUE INTEGRITY - ADULT  Goal: Skin Integrity remains intact(Skin Breakdown Prevention)  Description: Assess:  -Perform [place] assessment every shift  -Clean and moisturize skin every 2 hours  -Inspect skin when repositioning, toileting, and assisting with ADLS  -Assess under medical devices such as picc every shift  -Assess extremities for adequate circulation and sensation     Bed Management:  -Have minimal linens on bed & keep smooth, unwrinkled  -Change linens as needed when moist or perspiring  -Avoid sitting or lying in one position for more than 2 hours while in bed  -Keep HOB at 30 degrees     Toileting:  -Offer bedside commode  -Assess for incontinence every 2 hours  -Use incontinent care products after each incontinent episode such as cleanser    Activity:  -Encourage activity and walks on unit  -Encourage or provide ROM exercises   -Turn and reposition patient every 2 Hours  -Use appropriate equipment to lift or move patient in bed      Skin Care:  -Avoid use of baby powder, tape, friction and shearing, hot water or constrictive clothing  -Relieve pressure over bony prominences using pillows, wedges  -Do not massage red bony areas    Next Steps:  -Teach patient strategies to minimize risks such as falling   -Consider consults to  interdisciplinary teams such as pt/ot  2/3/2023 1242 by Aishwarya Slater RN  Outcome: Adequate for Discharge  2/3/2023 1043 by Aishwarya Slater RN  Outcome: Progressing  Goal: Incision(s), wounds(s) or drain site(s) healing without S/S of infection  Description: INTERVENTIONS  - Assess and document dressing, incision, wound bed, drain sites and surrounding tissue  - Provide patient and family education  - Perform skin care/dressing changes every daily  2/3/2023 1242 by Aishwarya Slater RN  Outcome: Adequate for Discharge  2/3/2023 1043 by Aishwarya Slater RN  Outcome: Progressing     Problem: HEMATOLOGIC - ADULT  Goal: Maintains hematologic stability  Description: INTERVENTIONS  - Assess for signs and symptoms of bleeding or hemorrhage  - Monitor labs  - Administer supportive blood products/factors as ordered and appropriate  2/3/2023 1242 by Aishwarya Slater RN  Outcome: Adequate for Discharge  2/3/2023 1043 by Aishwarya Slater RN  Outcome: Progressing

## 2023-02-03 NOTE — CASE MANAGEMENT
Case Management Discharge Planning Note    Patient name Tana Rico  Location [facility] 68 2 /South 2 Rita Chang* MRN [id number]  : 1960 Date 2/3/2023       Current Admission Date: 2023  Current Admission Diagnosis:Sepsis due to urinary tract infection [facility])   Patient Active Problem List    Diagnosis Date Noted   • Osteomyelitis ([address] ) 2023   • Altered mental status 2023   • Forehead laceration 2023   • Bacteremia 2023   • Cardiac arrest ([address] ) 2023   • Troponin level elevated 2023   • Hypomagnesemia 2023   • Sepsis due to urinary tract infection ([address] ) 2023   • Fall 2023   • Mild protein-calorie malnutrition ([address] ) 2022   • Diarrhea 2022   • CVA (cerebral vascular accident) ([address] ) 2022   • HIT (heparin-induced thrombocytopenia) 2022   • Diabetic ulcer of heel associated with diabetes mellitus due to underlying condition ([address] ) 11/10/2022   • Anemia 10/30/2022   • Generalized edema due to fluid overload 10/27/2022   • PAD (peripheral artery disease) ([address] ) 10/25/2022   • Non healing left heel wound 10/22/2022   • Hypertensive urgency 10/22/2022   • Hypokalemia 10/22/2022   • Wound of lower extremity 10/21/2022   • Epigastric pain 2022   • Type 2 diabetes mellitus with hyperglycemia, with long-term current use of insulin ([address] ) 10/18/2021   • Hyperparathyroidism ([address] ) 10/18/2021   • 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 ([address] ) 2021   • 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   • Moderate protein-calorie malnutrition ([address] ) 2020   • Acute colitis 2020   • Arthritis 2019   • Depression, recurrent ([address] ) 2018   • Class 3 severe obesity due to excess calories with serious comorbidity and body mass index (BMI) of 40 0 to 44 9 in adult ([facility] ) 2018   • Esophageal reflux 2018   • DM (diabetes mellitus) type II, controlled, with peripheral vascular disorder ([place] [address] ) 02/23/2018   • Diabetic peripheral neuropathy ([place] [address] ) 42/06/2167   • Systolic murmur 65/22/6014   • Stroke ([place] [address] ) 12/14/2015   • History of CVA (cerebrovascular accident) 12/14/2015   • Anxiety 03/19/2015   • Vitamin D deficiency 03/19/2015   • Hypertension 08/28/2012   • Familial combined hyperlipidemia 08/28/2012      LOS (days): 17  Geometric Mean LOS (GMLOS) (days): 9 60  Days to GMLOS:-7 1     OBJECTIVE:  Risk of Unplanned Readmission Score: 46 65         Current admission status: Inpatient   Preferred Pharmacy:   [facility] [id number], [address] [place] [address] Drive  Phone: [phone] Fax: [phone]    Primary Care Provider: ALEX Green    Primary Insurance: [employer] [facility]  Secondary Insurance:     DISCHARGE DETAILS:    Transport at Discharge : [facility]  Dispatcher Contacted: Yes  Number/Name of Dispatcher: SLETS  Transported by [employer] and Unit #): TBD     Additional Comments: Patient reviewed with Dr Shellie Parker who confirmed that pt is medically stable for discharge  CM spoke with Emily Fernando from [place] ([phone]) who reports that they are able to accept pt back today auth pending because pt is a long term resident  She requested CM confirm with Dr Shellie Parker that the preferred medication is Cresemba, Dr Shellie Parker confirmed garcia Bernstein reports they are able to accommodate  CM sent S transport request via [employer], awaiting confirmation of transportation time  CM department to follow      Accepting Facility Name, [address] : [place]  Receiving Facility/Agency Phone Number: [phone]  Facility/Agency Fax Number: [phone]

## 2023-02-03 NOTE — CASE MANAGEMENT
Case Management Discharge Planning Note    Patient name Wayne Read  Location [place] [address] 2 /South 2 Sheng Fonseca* MRN [id number]  : 1960 Date 2/3/2023       Current Admission Date: 2023  Current Admission Diagnosis:Sepsis due to urinary tract infection [facility])   Patient Active Problem List    Diagnosis Date Noted   • Osteomyelitis ([address] ) 2023   • Altered mental status 2023   • Forehead laceration 2023   • Bacteremia 2023   • Cardiac arrest ([address] ) 2023   • Troponin level elevated 2023   • Hypomagnesemia 2023   • Sepsis due to urinary tract infection ([address] ) 2023   • Fall 2023   • Mild protein-calorie malnutrition ([address] ) 2022   • Diarrhea 2022   • CVA (cerebral vascular accident) ([address] ) 2022   • HIT (heparin-induced thrombocytopenia) 2022   • Diabetic ulcer of heel associated with diabetes mellitus due to underlying condition ([address] ) 11/10/2022   • Anemia 10/30/2022   • Generalized edema due to fluid overload 10/27/2022   • PAD (peripheral artery disease) ([address] ) 10/25/2022   • Non healing left heel wound 10/22/2022   • Hypertensive urgency 10/22/2022   • Hypokalemia 10/22/2022   • Wound of lower extremity 10/21/2022   • Epigastric pain 2022   • Type 2 diabetes mellitus with hyperglycemia, with long-term current use of insulin ([address] ) 10/18/2021   • Hyperparathyroidism ([address] ) 10/18/2021   • 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 ([address] ) 2021   • 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   • Moderate protein-calorie malnutrition ([address] ) 2020   • Acute colitis 2020   • Arthritis 2019   • Depression, recurrent ([address] ) 2018   • Class 3 severe obesity due to excess calories with serious comorbidity and body mass index (BMI) of 40 0 to 44 9 in adult ([facility] ) 2018   • Esophageal reflux 2018   • DM (diabetes mellitus) type II, controlled, with peripheral vascular disorder ([place] [address] ) 02/23/2018   • Diabetic peripheral neuropathy ([place] [address] ) 34/42/6876   • Systolic murmur 57/62/8789   • Stroke ([place] [address] ) 12/14/2015   • History of CVA (cerebrovascular accident) 12/14/2015   • Anxiety 03/19/2015   • Vitamin D deficiency 03/19/2015   • Hypertension 08/28/2012   • Familial combined hyperlipidemia 08/28/2012      LOS (days): 17  Geometric Mean LOS (GMLOS) (days): 9 60  Days to GMLOS:-7 2     OBJECTIVE:  Risk of Unplanned Readmission Score: 46 72         Current admission status: Inpatient   Preferred Pharmacy:   [facility] #[id number] Felix Macdonald[address] [facility]  Phone: [phone] Fax: [phone]    Primary Care Provider: Stanford Vasquez    Primary Insurance: [address] [place] [address] Drive:     [address] [place] [address] Avenue Number: [id number]

## 2023-02-03 NOTE — ASSESSMENT & PLAN NOTE
Replete PRN; possibly related to hyperaldosteronism?     Recent Labs     02/01/23  0509 02/02/23  0505 02/03/23  0529   K 3 8 3 7 4 0   MG 1 7* 1 6* 1 9

## 2023-02-03 NOTE — ASSESSMENT & PLAN NOTE
Hemoglobin continues to improve; 8 4 on morning labs  Monitor and transfuse for hemoglobin of 7 or less

## 2023-02-03 NOTE — PROGRESS NOTES
Progress Note - Infectious Disease   Chester Pedroza 65 y o  female MRN: [id number]  Unit/Bed#: [place] [address] -01 Encounter: [id number]      Impression/Plan:    1   Sepsis, POA   Patient met sepsis criteria on presentation   Sources are 2 and 3  Course complicated by 4   Extensive resistance developing on prior cultures   Clinical parameters overall improved except for leukocytosis   Ongoing white count likely related to the right stump, now downtrending post interventions   -Antibiotics and antifungal plan as below  -continue to trend fevers, vitals, WBC count  -Additional supportive care as per primary  -Duration of therapy as below     2   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   Blood cultures have isolated Enterobacter and Enterococcus faecalis  Enterobacter resistant to Ertapenem but no carbapenemase present on confirmatory testing   Bacteremia likely due to infection of the right AKA stump given prior wound cultures and now findings on CT imaging with contrast  2D echo limited  No other obvious ectopic focus of infection on extensive imaging   Repeat cultures cleared rapidly   Wound cultures from 1/24 with a few colonies of staph likely colonizing the open wound and yeast only in broth culture likely not significant   Transesophageal echo completed on 1/27 which was without vegetation   Underwent debridement of the right stump with vascular on 1/27   OR cultures now showing candida albicans, Enterococcus and Enterobacter  Plan for combination oral and IV therapy  EKG with prolonged QTC limiting antifungal options  Completed 7 days meropenem for Enterobacter bacteremia and now transition to oral doxycycline   Patient completed 2 weeks IV Ampicillin for Enterococcal bacteremia  -continue PO doxycycline 100 every 12 hours for Enterobacter isolated in bone  -continue Augmentin 875 every 12 hours for Enterococcus in bone  -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 mg every 24 for fungal osteomyelitis while inpatient  -[facility] Germain is able to cover the cost of Cresemba  Dosing is 372mg TID x 2 days followed by 372mg daily  Can transition to Cresemba at the rehab  -Plan for 6 weeks of antibiotic coverage for bone and joint infection through 3/9/23  -Plan for total 6 months of antifungal coverage for bone and joint infection through 23  -remove PICC prior to discharge  -Continue to trend fever curve/vitals  -will arrange ID follow up in 2 weeks  -weekly CBC diff and CMP after discharge     3   Complicated urinary tract infection   Patient unable to provide any history  Domingo Collado was grossly abnormal on presentation   Possibly contributed to presentation   Higher suspicion for wound infection as above   Course of therapy completed for this issue   -Continue antibiotic as above  -Monitor abdominal exam  -Catheters as per primary     4   Cardiac arrest   Suspect that this may have been unrelated to the above   Patient with multiple other comorbidities as well   Ongoing care and work-up as per critical care/cardiology  Patient extubated 2023     5   Transaminitis   Acute elevation noted likely in the setting of 4   We will need to monitor LFTs however on antibiotics above  Currently stable  -Antibiotic as above  -monitor LFTs  -Monitor abdominal exam     Above plan discussed in detail with patient at bedside and Dr Walt Jones from [facility]  ID will see again 23 if remains inpatient  Antibiotics:  Augmentin day 2  Micafungin 5  Doxycycline 4  Total antibiotic 17    Subjective: The patient is feeling okay  She has some pain in the right stump  Denies fever, chills, abdominal pain  Plan for discharge to rehab today      Objective:  Vitals:  Temp:  [97 8 °F (36 6 °C)-98 2 °F (36 8 °C)] 98 °F (36 7 °C)  HR:  [68-89] 79  Resp:  [18-20] 19  BP: (150-188)/(72-97) 166/79  SpO2:  [94 %-97 %] 96 %  Temp (24hrs), Av °F (36 7 °C), Min:97 8 °F (36 6 °C), Max:98 2 °F (36 8 °C)  Current: Temperature: 98 °F (36 7 °C)    Physical Exam:   General Appearance:  Chronically ill appearing but interactive, nontoxic, no acute distress  Throat: Oropharynx moist without lesions  Lungs:   Clear to auscultation bilaterally; no wheezes, rhonchi or rales; respirations unlabored   Heart:  RRR; no murmur, rub or gallop   Abdomen:   Soft, non-tender, non-distended, positive bowel sounds  Extremities: No clubbing, cyanosis or edema   Skin: No new rashes or lesions  No new draining wounds noted  Areas of bruising are healing  Right stump with dressing in place       Labs:    All pertinent labs and imaging studies were personally reviewed  Results from last 7 days   Lab Units 02/03/23  0529 02/02/23  0505 02/01/23  0509   WBC Thousand/uL 15 05* 15 60* 18 01*   HEMOGLOBIN g/dL 8 4* 8 2* 7 9*   PLATELETS Thousands/uL 394* 401* 452*     Results from last 7 days   Lab Units 02/03/23  0529 02/02/23  0505 02/01/23  0509   SODIUM mmol/L 137 137 138   POTASSIUM mmol/L 4 0 3 7 3 8   CHLORIDE mmol/L 105 104 105   CO2 mmol/L 25 27 28   BUN mg/dL 10 7 6   CREATININE mg/dL 0 60 0 50* 0 48*   EGFR ml/min/1 73sq m 98 104 106   CALCIUM mg/dL 8 0* 7 9* 7 7*   AST U/L 13 13 13   ALT U/L 10 9 9   ALK PHOS U/L 202* 191* 184*                       Micro:  Results from last 7 days   Lab Units 01/27/23  1610   GRAM STAIN RESULT  2+ Polys  No bacteria seen     No new imaging available

## 2023-02-03 NOTE — PLAN OF CARE
Problem: MOBILITY - ADULT  Goal: Maintain or return to baseline ADL function  Description: INTERVENTIONS:  -  Assess patient's ability to carry out ADLs; assess patient's baseline for ADL function and identify physical deficits which impact ability to perform ADLs (bathing, care of mouth/teeth, toileting, grooming, dressing, etc )  - Assess/evaluate cause of self-care deficits   - Assess range of motion  - Assess patient's mobility; develop plan if impaired  - Assess patient's need for assistive devices and provide as appropriate  - Encourage maximum independence but intervene and supervise when necessary  - Involve family in performance of ADLs  - Assess for home care needs following discharge   - Consider OT consult to assist with ADL evaluation and planning for discharge  - Provide patient education as appropriate  Outcome: Progressing  Goal: Maintains/Returns to pre admission functional level  Description: INTERVENTIONS:  - Perform BMAT or MOVE assessment daily    - Set and communicate daily mobility goal to care team and patient/family/caregiver  - Collaborate with rehabilitation services on mobility goals if consulted  - Perform Range of Motion 4 times a day  - Reposition patient every 2 hours    - Dangle patient 4 times a day  - Stand patient 4 times a day  - Ambulate patient 4 times a day  - Out of bed to chair 4 times a day   - Out of bed for meals 4 times a day  - Out of bed for toileting  - Record patient progress and toleration of activity level   Outcome: Progressing     Problem: Prexisting or High Potential for Compromised Skin Integrity  Goal: Skin integrity is maintained or improved  Description: INTERVENTIONS:  - Identify patients at risk for skin breakdown  - Assess and monitor skin integrity  - Assess and monitor nutrition and hydration status  - Monitor labs   - Assess for incontinence   - Turn and reposition patient  - Assist with mobility/ambulation  - Relieve pressure over bony prominences  - Avoid friction and shearing  - Provide appropriate hygiene as needed including keeping skin clean and dry  - Evaluate need for skin moisturizer/barrier cream  - Collaborate with interdisciplinary team   - Patient/family teaching  - Consider wound care consult   Outcome: Progressing     Problem: PAIN - ADULT  Goal: Verbalizes/displays adequate comfort level or baseline comfort level  Description: Interventions:  - Encourage patient to monitor pain and request assistance  - Assess pain using appropriate pain scale  - Administer analgesics based on type and severity of pain and evaluate response  - Implement non-pharmacological measures as appropriate and evaluate response  - Consider cultural and social influences on pain and pain management  - Notify physician/advanced practitioner if interventions unsuccessful or patient reports new pain  Outcome: Progressing     Problem: INFECTION - ADULT  Goal: Absence or prevention of progression during hospitalization  Description: INTERVENTIONS:  - Assess and monitor for signs and symptoms of infection  - Monitor lab/diagnostic results  - Monitor all insertion sites, i e  indwelling lines, tubes, and drains  - Monitor endotracheal if appropriate and nasal secretions for changes in amount and color  - [place] appropriate cooling/warming therapies per order  - Administer medications as ordered  - Instruct and encourage patient and family to use good hand hygiene technique  - Identify and instruct in appropriate isolation precautions for identified infection/condition  Outcome: Progressing     Problem: SAFETY ADULT  Goal: Maintain or return to baseline ADL function  Description: INTERVENTIONS:  -  Assess patient's ability to carry out ADLs; assess patient's baseline for ADL function and identify physical deficits which impact ability to perform ADLs (bathing, care of mouth/teeth, toileting, grooming, dressing, etc )  - Assess/evaluate cause of self-care deficits - Assess range of motion  - Assess patient's mobility; develop plan if impaired  - Assess patient's need for assistive devices and provide as appropriate  - Encourage maximum independence but intervene and supervise when necessary  - Involve family in performance of ADLs  - Assess for home care needs following discharge   - Consider OT consult to assist with ADL evaluation and planning for discharge  - Provide patient education as appropriate  Outcome: Progressing  Goal: Maintains/Returns to pre admission functional level  Description: INTERVENTIONS:  - Perform BMAT or MOVE assessment daily    - Set and communicate daily mobility goal to care team and patient/family/caregiver  - Collaborate with rehabilitation services on mobility goals if consulted  - Perform Range of Motion 4 times a day  - Reposition patient every 2 hours    - Dangle patient 4 times a day  - Stand patient 4 times a day  - Ambulate patient 4 times a day  - Out of bed to chair 4 times a day   - Out of bed for meals 4 times a day  - Out of bed for toileting  - Record patient progress and toleration of activity level   Outcome: Progressing  Goal: Patient will remain free of falls  Description: INTERVENTIONS:  - Educate patient/family on patient safety including physical limitations  - Instruct patient to call for assistance with activity   - Consult OT/PT to assist with strengthening/mobility   - Keep Call bell within reach  - Keep bed low and locked with side rails adjusted as appropriate  - Keep care items and personal belongings within reach  - Initiate and maintain comfort rounds  - Make Fall Risk Sign visible to staff  - Apply yellow socks and bracelet for high fall risk patients  - Consider moving patient to room near nurses station  Outcome: Progressing     Problem: DISCHARGE PLANNING  Goal: Discharge to home or other facility with appropriate resources  Description: INTERVENTIONS:  - Identify barriers to discharge w/patient and caregiver  - Arrange for needed discharge resources and transportation as appropriate  - Identify discharge learning needs (meds, wound care, etc )  - Arrange for interpretive services to assist at discharge as needed  - Refer to Case Management Department for coordinating discharge planning if the patient needs post-hospital services based on physician/advanced practitioner order or complex needs related to functional status, cognitive ability, or social support system  Outcome: Progressing     Problem: Knowledge Deficit  Goal: Patient/family/caregiver demonstrates understanding of disease process, treatment plan, medications, and discharge instructions  Description: Complete learning assessment and assess knowledge base  Interventions:  - Provide teaching at level of understanding  - Provide teaching via preferred learning methods  Outcome: Progressing     Problem: Nutrition/Hydration-ADULT  Goal: Nutrient/Hydration intake appropriate for improving, restoring or maintaining nutritional needs  Description: Monitor and assess patient's nutrition/hydration status for malnutrition  Collaborate with interdisciplinary team and initiate plan and interventions as ordered  Monitor patient's weight and dietary intake as ordered or per policy  Utilize nutrition screening tool and intervene as necessary  Determine patient's food preferences and provide high-protein, high-caloric foods as appropriate       INTERVENTIONS:  - Monitor oral intake, urinary output, labs, and treatment plans  - Assess nutrition and hydration status and recommend course of action  - Evaluate amount of meals eaten  - Assist patient with eating if necessary   - Allow adequate time for meals  - Recommend/ encourage appropriate diets, oral nutritional supplements, and vitamin/mineral supplements  - Order, calculate, and assess calorie counts as needed  - Recommend, monitor, and adjust tube feedings and TPN/PPN based on assessed needs  - Assess need for intravenous fluids  - Provide specific nutrition/hydration education as appropriate  - Include patient/family/caregiver in decisions related to nutrition  Outcome: Progressing     Problem: COPING  Goal: Pt/Family able to verbalize concerns and demonstrate effective coping strategies  Description: INTERVENTIONS:  - Assist patient/family to identify coping skills, available support systems and cultural and spiritual values  - Provide emotional support, including active listening and acknowledgement of concerns of patient and caregivers  - Reduce environmental stimuli, as able  - Provide patient education  - Assess for spiritual pain/suffering and initiate spiritual care, including notification of Pastoral Care or chidi based community as needed  - Assess effectiveness of coping strategies  Outcome: Progressing  Goal: Will report anxiety at manageable levels  Description: INTERVENTIONS:  - Administer medication as ordered  - Teach and encourage coping skills  - Provide emotional support  - Assess patient/family for anxiety and ability to cope  Outcome: Progressing     Problem: SKIN/TISSUE INTEGRITY - ADULT  Goal: Skin Integrity remains intact(Skin Breakdown Prevention)  Description: Assess:  -Perform Nicanor assessment every shift  -Clean and moisturize skin every 2 hours  -Inspect skin when repositioning, toileting, and assisting with ADLS  -Assess under medical devices such as picc every shift  -Assess extremities for adequate circulation and sensation     Bed Management:  -Have minimal linens on bed & keep smooth, unwrinkled  -Change linens as needed when moist or perspiring  -Avoid sitting or lying in one position for more than 2 hours while in bed  -Keep HOB at 30 degrees     Toileting:  -Offer bedside commode  -Assess for incontinence every 2 hours  -Use incontinent care products after each incontinent episode such as cleanser    Activity:  -Encourage activity and walks on unit  -Encourage or provide ROM exercises -Turn and reposition patient every 2 Hours  -Use appropriate equipment to lift or move patient in bed      Skin Care:  -Avoid use of baby powder, tape, friction and shearing, hot water or constrictive clothing  -Relieve pressure over bony prominences using pillows, wedges  -Do not massage red bony areas    Next Steps:  -Teach patient strategies to minimize risks such as falling   -Consider consults to  interdisciplinary teams such as pt/ot  Outcome: Progressing  Goal: Incision(s), wounds(s) or drain site(s) healing without S/S of infection  Description: INTERVENTIONS  - Assess and document dressing, incision, wound bed, drain sites and surrounding tissue  - Provide patient and family education  - Perform skin care/dressing changes every daily  Outcome: Progressing     Problem: HEMATOLOGIC - ADULT  Goal: Maintains hematologic stability  Description: INTERVENTIONS  - Assess for signs and symptoms of bleeding or hemorrhage  - Monitor labs  - Administer supportive blood products/factors as ordered and appropriate  Outcome: Progressing

## 2023-02-03 NOTE — UTILIZATION REVIEW
NOTIFICATION OF ADMISSION DISCHARGE   This is a Notification of Discharge from [address]  Please be advised that this patient has been discharge from our facility  Below you will find the admission and discharge date and time including the patient’s disposition  UTILIZATION REVIEW CONTACT:  José Miguel Zuñiga  Utilization   Network Utilization Review Department  Phone: [phone] x carefully listen to the prompts  All voicemails are confidential   Email: [email or web address]     ADMISSION INFORMATION  PRESENTATION DATE: 1/17/2023  3:48 PM  OBERVATION ADMISSION DATE:   INPATIENT ADMISSION DATE: 1/17/23  5:54 PM   DISCHARGE DATE: 2/3/2023  1:43 PM   DISPOSITION:Non [facility]    IMPORTANT INFORMATION:  Send all requests for admission clinical reviews, approved or denied determinations and any other requests to dedicated fax number below belonging to the campus where the patient is receiving treatment   List of dedicated fax numbers:  [address] DENIALS (Administrative/Medical Necessity) [phone]   [address] (Maternity/NICU/Pediatrics) [phone]   [facility] [phone]   [facility] 87 [phone]   [facility] 134 [phone]   220 [facility] [phone] 90 [facility] [phone]   [address] [place] [address] 119 [phone]   [facility]  [phone] 4058 [facility] [phone]   412 [facility] [address] [facility] [phone]

## 2023-02-03 NOTE — CASE MANAGEMENT
Case Management Discharge Planning Note    Patient name Wayne Read  Location [place] [address] 2 /[address] 2 Sheng Fonseca* MRN [id number]  : 1960 Date 2/3/2023       Current Admission Date: 2023  Current Admission Diagnosis:Sepsis due to urinary tract infection [facility])   Patient Active Problem List    Diagnosis Date Noted   • Osteomyelitis ([address] ) 2023   • Altered mental status 2023   • Forehead laceration 2023   • Bacteremia 2023   • Cardiac arrest ([address] ) 2023   • Troponin level elevated 2023   • Hypomagnesemia 2023   • Sepsis due to urinary tract infection ([address] ) 2023   • Fall 2023   • Mild protein-calorie malnutrition ([address] ) 2022   • Diarrhea 2022   • CVA (cerebral vascular accident) ([address] ) 2022   • HIT (heparin-induced thrombocytopenia) 2022   • Diabetic ulcer of heel associated with diabetes mellitus due to underlying condition ([address] ) 11/10/2022   • Anemia 10/30/2022   • Generalized edema due to fluid overload 10/27/2022   • PAD (peripheral artery disease) ([address] ) 10/25/2022   • Non healing left heel wound 10/22/2022   • Hypertensive urgency 10/22/2022   • Hypokalemia 10/22/2022   • Wound of lower extremity 10/21/2022   • Epigastric pain 2022   • Type 2 diabetes mellitus with hyperglycemia, with long-term current use of insulin ([address] ) 10/18/2021   • Hyperparathyroidism ([address] ) 10/18/2021   • 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 ([address] ) 2021   • 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   • Moderate protein-calorie malnutrition ([address] ) 2020   • Acute colitis 2020   • Arthritis 2019   • Depression, recurrent ([address] ) 2018   • Class 3 severe obesity due to excess calories with serious comorbidity and body mass index (BMI) of 40 0 to 44 9 in adult ([facility] ) 2018   • Esophageal reflux 2018   • DM (diabetes mellitus) type II, controlled, with peripheral vascular disorder ([facility] ) 02/23/2018   • Diabetic peripheral neuropathy ([place] [address] ) 98/71/9595   • Systolic murmur 66/87/6212   • Stroke ([place] [address] ) 12/14/2015   • History of CVA (cerebrovascular accident) 12/14/2015   • Anxiety 03/19/2015   • Vitamin D deficiency 03/19/2015   • Hypertension 08/28/2012   • Familial combined hyperlipidemia 08/28/2012      LOS (days): 17  Geometric Mean LOS (GMLOS) (days): 9 60  Days to GMLOS:-7 1     OBJECTIVE:  Risk of Unplanned Readmission Score: 46 65         Current admission status: Inpatient   Preferred Pharmacy:   [facility] [id number] [address]  Phone: [phone] Fax: [phone]    Primary Care Provider: ALEX Fraire    [facility]  Secondary Insurance:     DISCHARGE DETAILS:     Transport at Discharge : [facility]  Dispatcher Contacted: Yes  Number/Name of Dispatcher: [facility]  Transported by [employer] and Unit #): [facility]  ETA of Transport (Date): 02/03/23  ETA of Transport (Time): 1330     Additional Comments: Per Roundtrip, pt to be transported back to [facility] today at 1:30 PM via [facility] S  Pt, troy Merrill RN, and Dr Maria Fernanda Romero aware of transportation time  CMN in paper chart      Accepting Facility Name, [address] : [facility]  Receiving Facility/Agency Phone Number: [phone]  Facility/Agency Fax Number: [phone]

## 2023-02-03 NOTE — PLAN OF CARE
Problem: MOBILITY - ADULT  Goal: Maintain or return to baseline ADL function  Description: INTERVENTIONS:  -  Assess patient's ability to carry out ADLs; assess patient's baseline for ADL function and identify physical deficits which impact ability to perform ADLs (bathing, care of mouth/teeth, toileting, grooming, dressing, etc )  - Assess/evaluate cause of self-care deficits   - Assess range of motion  - Assess patient's mobility; develop plan if impaired  - Assess patient's need for assistive devices and provide as appropriate  - Encourage maximum independence but intervene and supervise when necessary  - Involve family in performance of ADLs  - Assess for home care needs following discharge   - Consider OT consult to assist with ADL evaluation and planning for discharge  - Provide patient education as appropriate  Outcome: Progressing  Goal: Maintains/Returns to pre admission functional level  Description: INTERVENTIONS:  - Perform BMAT or MOVE assessment daily    - Set and communicate daily mobility goal to care team and patient/family/caregiver  - Collaborate with rehabilitation services on mobility goals if consulted  - Perform Range of Motion 4 times a day  - Reposition patient every 2 hours    - Dangle patient 4 times a day  - Stand patient 4 times a day  - Ambulate patient 4 times a day  - Out of bed to chair 4 times a day   - Out of bed for meals 4 times a day  - Out of bed for toileting  - Record patient progress and toleration of activity level   Outcome: Progressing     Problem: Prexisting or High Potential for Compromised Skin Integrity  Goal: Skin integrity is maintained or improved  Description: INTERVENTIONS:  - Identify patients at risk for skin breakdown  - Assess and monitor skin integrity  - Assess and monitor nutrition and hydration status  - Monitor labs   - Assess for incontinence   - Turn and reposition patient  - Assist with mobility/ambulation  - Relieve pressure over bony prominences  - Avoid friction and shearing  - Provide appropriate hygiene as needed including keeping skin clean and dry  - Evaluate need for skin moisturizer/barrier cream  - Collaborate with interdisciplinary team   - Patient/family teaching  - Consider wound care consult   Outcome: Progressing     Problem: PAIN - ADULT  Goal: Verbalizes/displays adequate comfort level or baseline comfort level  Description: Interventions:  - Encourage patient to monitor pain and request assistance  - Assess pain using appropriate pain scale  - Administer analgesics based on type and severity of pain and evaluate response  - Implement non-pharmacological measures as appropriate and evaluate response  - Consider cultural and social influences on pain and pain management  - Notify physician/advanced practitioner if interventions unsuccessful or patient reports new pain  Outcome: Progressing     Problem: INFECTION - ADULT  Goal: Absence or prevention of progression during hospitalization  Description: INTERVENTIONS:  - Assess and monitor for signs and symptoms of infection  - Monitor lab/diagnostic results  - Monitor all insertion sites, i e  indwelling lines, tubes, and drains  - Monitor endotracheal if appropriate and nasal secretions for changes in amount and color  - [place] appropriate cooling/warming therapies per order  - Administer medications as ordered  - Instruct and encourage patient and family to use good hand hygiene technique  - Identify and instruct in appropriate isolation precautions for identified infection/condition  Outcome: Progressing     Problem: SAFETY ADULT  Goal: Maintain or return to baseline ADL function  Description: INTERVENTIONS:  -  Assess patient's ability to carry out ADLs; assess patient's baseline for ADL function and identify physical deficits which impact ability to perform ADLs (bathing, care of mouth/teeth, toileting, grooming, dressing, etc )  - Assess/evaluate cause of self-care deficits - Assess range of motion  - Assess patient's mobility; develop plan if impaired  - Assess patient's need for assistive devices and provide as appropriate  - Encourage maximum independence but intervene and supervise when necessary  - Involve family in performance of ADLs  - Assess for home care needs following discharge   - Consider OT consult to assist with ADL evaluation and planning for discharge  - Provide patient education as appropriate  Outcome: Progressing  Goal: Maintains/Returns to pre admission functional level  Description: INTERVENTIONS:  - Perform BMAT or MOVE assessment daily    - Set and communicate daily mobility goal to care team and patient/family/caregiver  - Collaborate with rehabilitation services on mobility goals if consulted  - Perform Range of Motion 4 times a day  - Reposition patient every 2 hours    - Dangle patient 4 times a day  - Stand patient 4 times a day  - Ambulate patient 4 times a day  - Out of bed to chair 4 times a day   - Out of bed for meals 4 times a day  - Out of bed for toileting  - Record patient progress and toleration of activity level   Outcome: Progressing  Goal: Patient will remain free of falls  Description: INTERVENTIONS:  - Educate patient/family on patient safety including physical limitations  - Instruct patient to call for assistance with activity   - Consult OT/PT to assist with strengthening/mobility   - Keep Call bell within reach  - Keep bed low and locked with side rails adjusted as appropriate  - Keep care items and personal belongings within reach  - Initiate and maintain comfort rounds  - Make Fall Risk Sign visible to staff  - Offer Toileting every  Hours, in advance of need  - Initiate/Maintain alarm  - Obtain necessary fall risk management equipment:  - Apply yellow socks and bracelet for high fall risk patients  - Consider moving patient to room near nurses station  Outcome: Progressing     Problem: DISCHARGE PLANNING  Goal: Discharge to home or other facility with appropriate resources  Description: INTERVENTIONS:  - Identify barriers to discharge w/patient and caregiver  - Arrange for needed discharge resources and transportation as appropriate  - Identify discharge learning needs (meds, wound care, etc )  - Arrange for interpretive services to assist at discharge as needed  - Refer to Case Management Department for coordinating discharge planning if the patient needs post-hospital services based on physician/advanced practitioner order or complex needs related to functional status, cognitive ability, or social support system  Outcome: Progressing     Problem: Knowledge Deficit  Goal: Patient/family/caregiver demonstrates understanding of disease process, treatment plan, medications, and discharge instructions  Description: Complete learning assessment and assess knowledge base  Interventions:  - Provide teaching at level of understanding  - Provide teaching via preferred learning methods  Outcome: Progressing     Problem: Nutrition/Hydration-ADULT  Goal: Nutrient/Hydration intake appropriate for improving, restoring or maintaining nutritional needs  Description: Monitor and assess patient's nutrition/hydration status for malnutrition  Collaborate with interdisciplinary team and initiate plan and interventions as ordered  Monitor patient's weight and dietary intake as ordered or per policy  Utilize nutrition screening tool and intervene as necessary  Determine patient's food preferences and provide high-protein, high-caloric foods as appropriate       INTERVENTIONS:  - Monitor oral intake, urinary output, labs, and treatment plans  - Assess nutrition and hydration status and recommend course of action  - Evaluate amount of meals eaten  - Assist patient with eating if necessary   - Allow adequate time for meals  - Recommend/ encourage appropriate diets, oral nutritional supplements, and vitamin/mineral supplements  - Order, calculate, and assess calorie counts as needed  - Recommend, monitor, and adjust tube feedings and TPN/PPN based on assessed needs  - Assess need for intravenous fluids  - Provide specific nutrition/hydration education as appropriate  - Include patient/family/caregiver in decisions related to nutrition  Outcome: Progressing     Problem: COPING  Goal: Pt/Family able to verbalize concerns and demonstrate effective coping strategies  Description: INTERVENTIONS:  - Assist patient/family to identify coping skills, available support systems and cultural and spiritual values  - Provide emotional support, including active listening and acknowledgement of concerns of patient and caregivers  - Reduce environmental stimuli, as able  - Provide patient education  - Assess for spiritual pain/suffering and initiate spiritual care, including notification of Pastoral Care or chidi based community as needed  - Assess effectiveness of coping strategies  Outcome: Progressing  Goal: Will report anxiety at manageable levels  Description: INTERVENTIONS:  - Administer medication as ordered  - Teach and encourage coping skills  - Provide emotional support  - Assess patient/family for anxiety and ability to cope  Outcome: Progressing     Problem: SKIN/TISSUE INTEGRITY - ADULT  Goal: Skin Integrity remains intact(Skin Breakdown Prevention)  Description: Assess:  -Perform [place] assessment every shift  -Clean and moisturize skin every 2 hours  -Inspect skin when repositioning, toileting, and assisting with ADLS  -Assess under medical devices such as picc every shift  -Assess extremities for adequate circulation and sensation     Bed Management:  -Have minimal linens on bed & keep smooth, unwrinkled  -Change linens as needed when moist or perspiring  -Avoid sitting or lying in one position for more than 2 hours while in bed  -Keep HOB at 30 degrees     Toileting:  -Offer bedside commode  -Assess for incontinence every 2 hours  -Use incontinent care products after each incontinent episode such as cleanser    Activity:  -Encourage activity and walks on unit  -Encourage or provide ROM exercises   -Turn and reposition patient every 2 Hours  -Use appropriate equipment to lift or move patient in bed      Skin Care:  -Avoid use of baby powder, tape, friction and shearing, hot water or constrictive clothing  -Relieve pressure over bony prominences using pillows, wedges  -Do not massage red bony areas    Next Steps:  -Teach patient strategies to minimize risks such as falling   -Consider consults to  interdisciplinary teams such as pt/ot  Outcome: Progressing  Goal: Incision(s), wounds(s) or drain site(s) healing without S/S of infection  Description: INTERVENTIONS  - Assess and document dressing, incision, wound bed, drain sites and surrounding tissue  - Provide patient and family education  - Perform skin care/dressing changes every daily  Outcome: Progressing     Problem: HEMATOLOGIC - ADULT  Goal: Maintains hematologic stability  Description: INTERVENTIONS  - Assess for signs and symptoms of bleeding or hemorrhage  - Monitor labs  - Administer supportive blood products/factors as ordered and appropriate  Outcome: Progressing

## 2023-02-03 NOTE — PROGRESS NOTES
[facility] [address], [address] [place] [address] Admission    NAME: Wayne Pardo  AGE: 65 y o  SEX: female [id number]    DATE OF ENCOUNTER: 2/3/2023    Assessment/Plan:   Patient’s care was coordinated with nursing facility staff  Recent vitals, labs and updated medications were reviewed on [facility]  Past Medical, surgical, social, medication and allergy history and patient’s previous records reviewed  1  Other acute [facility]el[facility] ([facility] )        2  Bacteremia        3  Cardiac arrest ([place] [address] )        4  Primary hypertension        5  Fall, sequela        6  PAD (peripheral artery disease) ([place] [address] )        7  HIT (heparin-induced thrombocytopenia)        8  Cerebrovascular accident (CVA), unspecified mechanism ([place] [address] )        9  Anemia, unspecified type        10  UTI (urinary tract infection) due to Enterococcus        11  Anxiety        12  Prolonged Q-T interval on ECG        13  DM (diabetes mellitus) type II, controlled, with peripheral vascular disorder ([place] [address] )        14  Hypomagnesemia             Osteomyelitis (HCC)  S/p Rt AKA stump revision on 1/27  Pt had enterococcus facecalis & enterobacter growing in blood cx  O R  cx grew candida albicans  Pt with staples c/d/i  Pt also with posterior lower stump packing intact  Pt still with leukocytosis on cbc (wbc 15 on 2/3)  Pt with prolonged QTC on ekg so pt has limited antifungal options  Pt completed 7 days meropenem & now is on oral doxy for enterobacter  Pt completed 2 weeks iv ampicillin for enterococcal bacteremia    I D  recommends:  Cont doxycyline 100 mg 1 tab po BID for enterobacter isolated in bone  Cont augmentin 875 mg 1 tab po BID for enterococcus  Cont Cresemba 372 mg TID x 2 days followed by 372 mg daily till 7/27/2023    Pt needs 6 weeks of antibiotic till 3/9/2023  I D   F/u 2 weeks  Weekly CBC with diff & CMP    Bacteremia  Pt grew Enterobacter & Enterococcus in her blood cultures  Repeat blood cx neg 1/21/2023  Cont doxy 100 mg BID for enterobacter until 3/9/2023  Cont aumentin 875 mg 1 tab po BID for enterococcus until 3/9/2023    Cardiac arrest [facility])  S/p cardiac arrest on 1/18/2023 due to electrolyte abnormalities/severe sepsis  Pt on amiodarone 200 mg 1 tab po daily  Cont lopressor 50 mg 1 tab po BID    Hypertension  148 / 70 mmHg   Controlled  Goal SBP <130/80  Cont hydrochlorothiazide 12 5 mg 1 tab po daily  Cont lisinopril 40 mg 1 tab po daily  Cont metoprolol 50 mg 1 tab po BID  Cont norvasc 10 mg 1 tab po daily  Cont hydralazine 100 mg 1 tab po TID    Fall  Pt had fallen on 1/15 onto her face when adjusting in her chair  Neg trauma scans as inpatient  Bruises on her face healing    PAD [facility]per[facility] disease) ([facility]  S/p Lt AKA 11/23/2022  S/p Rt AKA wound debridgement 12/1/2022  S/p Rt aka wound/stump washout & vac placement 12/16/2022  S/p rt aka wound/stump vac change 12/19/2022  S/p lt aka wound/stump washout/packing 12/19/2022 12/19/2022 wound cx grew enterobacter cloacae  1/27/2023 s/p Rt amputation revision stump    Cont atorvastatin 40 mg 1 tab po QHS  Cont clopidogrel 75 mg 1 tab po daily  Cont coumadin 5 mg 1 tab po daily (goal INR 2-3)  Cont oxycodone 5 mg 1 tab po q4 hours prn severe pain  Cont gabapentin 200 mg 1 tab po BID  Pt on methacarbamol 1000 mg 1 tab po TID prn muscle spasms/pain    HIT (heparin-induced thrombocytopenia)  Pt able to handle coumadin  Cont coumadin 5 mg 1 tab po daily  Goal INR 2-3  2/3/2023 INR 1 83  Next INR 2/6    CVA (cerebral vascular accident) ([facility] )  Pt with hx Rt CVA on 11/14 with residual left sided weakness as per records  INR 1 83 on 2/3  Goal INR 2-3  Next INR check on 2/6  Cont coumadin 5 mg 1 tab po daily  Cont plavix 75 mg 1 tab po daily  Cont atorvastatin 40 mg 1 tab po daily  Cont good BP control    Anemia  Post-op acute blood loss anemia  H/h lowish but stable  2/2/2023 8  2/26 5    UTI (urinary tract infection) due to Enterococcus  Pt had sepsis due to Enterococcus UTI  Ur cx grew >100,000 enterococcus  Cont augmentin/doxycyline till 3/9  Pt to see I D  in 2 weeks    Anxiety  Pt confused/anxious upon arrival to [facility]   Per RN, pt having hallucination of a person standing next to RN  Cont seroquel 25 mg 1 tab po QHS  Agree with wellbutrin xl 150 mg 1 tab po daily  Cont sertraline 100 mg 1 tab po daily  Pt did not receive xanax as inpatient so will hold off now given pt's recent delirium  Pt agreeable to psych eval/counseling    Prolonged Q-T interval on ECG  1/31/2022 EKG  QTC Interval ms 511    Check periodic ekg as pt on amiodarone, doxy, zoloft & seroquel as per the hospital    DM (diabetes mellitus) type II, controlled, with peripheral vascular disorder ([facility] )    Lab Results   Component Value Date    HGBA1C 9 8 (H) 10/25/2022   Cont lantus 20 units SQ QH  TRULICITY 5 24GN/0 4IH INJ PEN INJECT 0 5ML (0 75MG) SQ EVERY WEEK ON TUESDAY         Hypomagnesemia  Pt had low Mag as inpatient  Cont slow mag        Chief Complaint     anxious    HPI   Patient is a 65 y o  female with PMH DMII, Hyperparathyroidism, PAD, HTN, CVA, Osteomyellitis, Arthritis, GERD, Vitamin D deficiency, depression, anxiety and anemia  Pt admitted from 1/17/2023-2/3/2023 for altered mental status due to sepsis  The sepsis was due to a combination of UTI, polymicrobial bacteremia and Rt femoral stump osteomyelitis  Pt's clinical course was worsened by an episode of cardiac arrest for which she was transferred to the ICU  Thankfully she survived and improved with antibiotics and ICU care  Pt then discharged to [facility] on 2/3/2023 for short term rehab  Pt seen and examined today  RN addressing pt's healing wound on rt stump  Jerel c/d/i  Pt also with an area of packing underneath the stump  Pt with old healing bruises from her fall a month ago  Pt appears tearful and crying   Pt wants to be full code  Encouraged pt to stay positive  Discussed how her previous collegues asking about her  She did not seem to remember  RN notes pt confused and having delusions  Pt on mulitple psych meds  Past Medical History:   Diagnosis Date   • Anxiety    • Depression    • Diabetes ([facility] )    • [facility]    • Esophageal reflux     28 APR 2015 RESOLVED   • Hyperlipidemia    • Hypertension    • Low back pain     sciatica   • Pneumonia 2019   • Stroke [facility] 12/2015    small vessel, L frontal corona radiata  Rx asa 81, Lipitor 40, risk modification   • Vitamin D deficiency        Past Surgical History:   Procedure Laterality Date   • AMPUTATION ABOVE KNEE (AKA) Right 12/1/2022    Procedure: (AKA), PSB  BKA;  Surgeon: Maria R Landeros DO;  [facility];  Service: Vascular   • ARTERIOGRAM Right 11/7/2022    Procedure: -Right lower extremity angiogram -Right CFA balloon angioplasty with [id number]  [employer]  -Right CFA DCB with 6x40 Bard Lutonix -Right CFA atherectomy with Jetstream and distal embolization protection with 7mm Spider FX -Right SFA/Pop angioplasty with 4x40 Chololate balloon -Right SFA/Pop DCB with 5x150 Bard Lutonix -Right SFA stent with 6x80 W W  FOXTOWN x2 -R   • COLONOSCOPY     • IR AORTAGRAM WITH RUN-OFF  10/31/2022   • IR LOWER EXTREMITY ANGIOGRAM  11/10/2022   • IR LOWER EXTREMITY ANGIOGRAM  11/7/2022   • NE AMP LEG THRU TIBIA&FIBULA SEC CLOSURE/SCAR REV Right 1/27/2023    Procedure: AMPUTATION REVISION STUMP;  Surgeon: Maria R Landeros DO;  [facility];  Service: Vascular   • NE AMPUTATION THIGH THROUGH FEMUR ANY LEVEL Left 11/23/2022    Procedure: (AKA);   Surgeon: Jay Quezada DO;  [facility];  Service: Vascular   • NE NEGATIVE PRESSURE WOUND THERAPY DME </= 50 SQ CM Bilateral 12/19/2022    Procedure: Right above knee amputation washout; vac change; Left above knee amputation debridement; vac placement;  Surgeon: Maria R Landeros DO;  [facility];  Service: Vascular   • WOUND DEBRIDEMENT Right 2022    Procedure: AKA STUMP WASHOUT, Left AKA Staple removal  application of wound vac to Right AKA;   Surgeon: Deborah Gray MD;  [facility];  Service: Vascular       Social History     Tobacco Use   Smoking Status Former   • Types: Cigarettes   • Quit date:    • Years since quittin 1   Smokeless Tobacco Never          Family History   Problem Relation Age of Onset   • Diabetes Mother    • Lung cancer Mother    • Cancer Father    • Lung cancer Father    • Hypertension Brother    • Hypertension Family         Allergies   Allergen Reactions   • Heparin Other (See Comments)     History of HIT          Current Outpatient Medications:   •  ALPRAZolam (XANAX) 0 25 mg tablet, Take 1 tablet (0 25 mg total) by mouth daily as needed for anxiety (panic attacks), Disp: 10 tablet, Rfl: 0  •  amiodarone 200 mg tablet, Take 1 tablet (200 mg total) by mouth daily, Disp: , Rfl:   •  amLODIPine (NORVASC) 10 mg tablet, TAKE 1 TABLET BY MOUTH EVERY DAY, Disp: 90 tablet, Rfl: 1  •  amoxicillin-clavulanate (AUGMENTIN) 875-125 mg per tablet, Take 1 tablet by mouth every 12 (twelve) hours for 69 doses, Disp: 60 tablet, Rfl: 0  •  atorvastatin (LIPITOR) 40 mg tablet, TAKE 1 TABLET BY MOUTH EVERY DAY, Disp: 90 tablet, Rfl: 1  •  buPROPion (WELLBUTRIN XL) 150 mg 24 hr tablet, Take 1 tablet (150 mg total) by mouth daily, Disp: 30 tablet, Rfl: 0  •  clopidogrel (PLAVIX) 75 mg tablet, Take 1 tablet (75 mg total) by mouth daily, Disp: 30 tablet, Rfl: 0  •  clotrimazole-betamethasone (LOTRISONE) 1-0 05 % cream, Apply topically 2 (two) times a day, Disp: 30 g, Rfl: 0  •  Continuous Blood Gluc  (FreeStyle Noé 14 Day [place]) LITO, Use 1 Units continuous (Patient not taking: Reported on 10/21/2022), Disp: 1 each, Rfl: 0  •  Continuous Blood Gluc Sensor (FreeStyle Noé 14 Day Sensor) MISC, Use daily as directed for CGM - Change every 14 days (Patient not taking: No sig reported), Disp: 2 each, Rfl: 1  •  doxycycline hyclate (VIBRAMYCIN) 100 mg capsule, Take 1 capsule (100 mg total) by mouth every 12 (twelve) hours for 69 doses, Disp: 60 capsule, Rfl: 0  •  fluocinonide (LIDEX) 0 05 % ointment, Apply topically 2 (two) times a day, Disp: 30 g, Rfl: 0  •  gabapentin (NEURONTIN) 100 mg capsule, TAKE 2 CAPSULES BY MOUTH TWICE A DAY, Disp: 360 capsule, Rfl: 0  •  hydrALAZINE (APRESOLINE) 100 MG tablet, Take 1 tablet (100 mg total) by mouth every 8 (eight) hours, Disp: , Rfl: 0  •  hydrochlorothiazide (HYDRODIURIL) 12 5 mg tablet, Take 1 tablet (12 5 mg total) by mouth daily Do not start before February 4, 2023 , Disp: , Rfl: 0  •  insulin glargine (LANTUS) 100 units/mL subcutaneous injection, Inject 20 Units under the skin every morning Do not start before February 4, 2023 , Disp: 10 mL, Rfl: 0  •  Insulin Pen Needle (B-D UF III MINI PEN NEEDLES) 31G X 5 MM MISC, USE WITH INSULIN FOUR TIMES DAILY, Disp: 200 each, Rfl: 3  •  Isavuconazonium Sulfate 186 MG CAPS, Take 2 capsules (372 mg total) by mouth 3 (three) times a day for 2 days, THEN 2 capsules (372 mg total) in the morning , Disp: , Rfl: 0  •  Lancets 28G MISC, Use 1 each 4 (four) times a day (Patient not taking: No sig reported), Disp: 400 each, Rfl: 0  •  lisinopril (ZESTRIL) 40 mg tablet, TAKE 1 TABLET BY MOUTH EVERY DAY, Disp: 90 tablet, Rfl: 1  •  magnesium chloride (MAG64) 64 MG TBEC EC tablet, Take 1 tablet (64 mg total) by mouth daily Do not start before February 4, 2023 , Disp: , Rfl: 0  •  methocarbamol 1000 MG TABS, Take 1,000 mg by mouth every 8 (eight) hours, Disp: 30 tablet, Rfl: 0  •  metoprolol tartrate (LOPRESSOR) 50 mg tablet, Take 1 tablet (50 mg total) by mouth every 12 (twelve) hours, Disp: , Rfl: 0  •  Multiple Vitamin (multivitamin) tablet, Take 1 tablet by mouth daily, Disp: , Rfl:   •  oxyCODONE (ROXICODONE) 5 immediate release tablet, Take 1 tablet (5 mg total) by mouth every 4 (four) hours as needed (severe pain, moderate pain) for up to 10 days Max Daily Amount: 30 mg, Disp: , Rfl: 0  •  pantoprazole (PROTONIX) 40 mg tablet, Take 1 tablet (40 mg total) by mouth daily for 14 days, Disp: 14 tablet, Rfl: 0  •  potassium chloride (K-DUR,KLOR-CON) 10 mEq tablet, Take 2 tablets (20 mEq total) by mouth 2 (two) times a day for 2 doses, Disp: 4 tablet, Rfl: 0  •  QUEtiapine (SEROquel) 25 mg tablet, Take 1 tablet (25 mg total) by mouth daily at bedtime, Disp: 30 tablet, Rfl: 0  •  sertraline (ZOLOFT) 100 mg tablet, TAKE 1 TABLET BY MOUTH EVERY DAY, Disp: 90 tablet, Rfl: 1  •  thiamine (VITAMIN B1) 100 mg tablet, Take 1 tablet (100 mg total) by mouth daily Do not start before February 4, 2023 , Disp: , Rfl: 0  •  Trulicity 6 32 MB/7 8EC SOPN, INJECT 0 5 ML (0 75 MG TOTAL) UNDER THE SKIN ONCE A WEEK TUESDAY, Disp: 2 mL, Rfl: 1  •  warfarin (COUMADIN) 4 mg tablet, Take 1 tablet (4 mg total) by mouth daily, Disp: , Rfl:   No current facility-administered medications for this visit  Updated list was reviewed in [facility] system of facility  VSS    Vital signs were reviewed in point click care    Review of Systems   Musculoskeletal: Positive for arthralgias  Psychiatric/Behavioral: Positive for confusion  The patient is nervous/anxious  All other systems reviewed and are negative  Physical Exam  Constitutional:       General: She is not in acute distress  Appearance: She is not ill-appearing  HENT:      Head: Normocephalic and atraumatic  Cardiovascular:      Rate and Rhythm: Normal rate and regular rhythm  Pulses: Normal pulses  Pulmonary:      Effort: Pulmonary effort is normal  No respiratory distress  Breath sounds: Normal breath sounds  No wheezing  Abdominal:      General: Abdomen is flat  Bowel sounds are normal  There is no distension  Palpations: Abdomen is soft  Tenderness: There is no abdominal tenderness     Skin:     Comments: Pt with Lt aka; Rt aka stump - staples c/d/i; pt also with posterior lower stump packing c/d/i   Neurological:      Mental Status: She is alert  She is confused  Comments: A&Ox2-3 but confused (thought it was Feb 4th when its Feb 3rd)   Psychiatric:         Attention and Perception: She perceives visual hallucinations  Mood and Affect: Mood is anxious  Behavior: Behavior is withdrawn  Behavior is cooperative  Diagnostic Data     Recent labs and imaging studies were reviewed  Code Status:    Full (defer to pt's family as pt confused)    Additional notes: Total time spent on H&P 48 minutes in reviewing discharge paperwork from the hospital, interpreting test results from hospital medical records, and documenting information in the medical record  In addition, I provided counseling to the patient and encouraged her to stay positive  Gave orders for patient's medications and nursing home admission orders  Ordered labs for 2/6/2023      This note was electronically signed by Dr Eunice Landeros

## 2023-02-03 NOTE — ASSESSMENT & PLAN NOTE
Lab Results   Component Value Date    HGBA1C 9 8 (H) 10/25/2022       Recent Labs     02/02/23  1122 02/02/23  1652 02/02/23  2159 02/03/23  0629   POCGLU 126 134 163* 90       Blood Sugar Average: Last 72 hrs:  (P) 99 1875     Home regimen: Lantus 60U HS, Novolog 2oU TID prior to meals  Inpatient regimen: Lantus 20UHS, sliding scale

## 2023-02-03 NOTE — ASSESSMENT & PLAN NOTE
History of HIT   On warfarin  Goal INR 2-3    Patient INR trending down with 3 mg; have reinitiated 5 mg; likely 4 mg for outpatient    Recent Labs     02/01/23  0509 02/03/23  0529   INR 2 01* 1 83*

## 2023-02-03 NOTE — DISCHARGE INSTR - AVS FIRST PAGE
Will need doxycycline and Augmentin through 3/9; can discontinue on 3/10/2023  Will need Cresemba as prescribed through 7/27/2023  Please see wound care instructions attached to the sheet for bilateral AKA stumps  Patient's blood glucose was well controlled on 20 units of Levemir nightly with sliding scale  At home was taking 20 units short acting with meals; however did not need that in the hospital   Monitor and adjust insulin as needed  Patient's blood pressures run high; obtained renal artery ultrasound and no stenosis detected  Renin and aldosterone labs pending  Continue amlodipine 10 mg daily, lisinopril 40 mg daily, hydralazine 100 mg 3 times daily, metoprolol 50 mg twice daily and hydrochlorothiazide 12 5 mg daily  Adjust as needed  Patient is on diabetic dysphagia level 3 diet with thin liquids; I recommend sodium restriction for better blood pressure control

## 2023-02-03 NOTE — CASE MANAGEMENT
Received call from [facility] stating pt is MA member and has case open with bed hold days available so pending Erenrizwan [place] has been voided and gave auth information for open case     Karli Baum 51 has received approved authorization from insurance:   [id number] [place]#  [phone]  Authorization received for: SNF  Facility: [facility] #: [id number]  Start of Care: 2/3  Next Review Date:2/10   Submit next review to: Call [phone]   Care Manager notified: Junior Webb

## 2023-02-03 NOTE — NURSING NOTE
Patient to be discharged 2/3/2023 1330  AVS and report reviewed with RN Valerie  at receiving facility  All questions answered  Patient's PICC removed per orders  Patient verbalized having all belongings for discharge  Patient to be picked up transport team @ 1330       Ophelia Olea 2/3/2023 12:52 PM

## 2023-02-04 PROBLEM — B95.2 UTI (URINARY TRACT INFECTION) DUE TO ENTEROCOCCUS: Status: ACTIVE | Noted: 2022-10-23

## 2023-02-04 PROBLEM — R94.31 PROLONGED Q-T INTERVAL ON ECG: Status: ACTIVE | Noted: 2023-01-31

## 2023-02-04 NOTE — ASSESSMENT & PLAN NOTE
S/p cardiac arrest on 1/18/2023 due to electrolyte abnormalities/severe sepsis  Pt on amiodarone 200 mg 1 tab po daily  Cont lopressor 50 mg 1 tab po BID

## 2023-02-04 NOTE — ASSESSMENT & PLAN NOTE
Pt had sepsis due to Enterococcus UTI  Ur cx grew >100,000 enterococcus  Cont augmentin/doxycyline till 3/9  Pt to see I D  in 2 weeks

## 2023-02-04 NOTE — ASSESSMENT & PLAN NOTE
Pt confused/anxious upon arrival to [facility]   Per RN, pt having hallucination of a person standing next to RN  Cont seroquel 25 mg 1 tab po QHS  Agree with wellbutrin xl 150 mg 1 tab po daily  Cont sertraline 100 mg 1 tab po daily  Pt did not receive xanax as inpatient so will hold off now given pt's recent delirium  Pt agreeable to psych eval/counseling

## 2023-02-04 NOTE — ASSESSMENT & PLAN NOTE
Pt had fallen on 1/15 onto her face when adjusting in her chair  Neg trauma scans as inpatient  Bruises on her face healing

## 2023-02-04 NOTE — TELEPHONE ENCOUNTER
Morris Noriega from [facility] called needing to review new admission orders  On call notified via TC

## 2023-02-04 NOTE — ASSESSMENT & PLAN NOTE
Pt grew Enterobacter & Enterococcus in her blood cultures  Repeat blood cx neg 1/21/2023  Cont doxy 100 mg BID for enterobacter until 3/9/2023  Cont aumentin 875 mg 1 tab po BID for enterococcus until 3/9/2023

## 2023-02-04 NOTE — ASSESSMENT & PLAN NOTE
Pt with hx Rt CVA on 11/14 with residual left sided weakness as per records  INR 1 83 on 2/3  Goal INR 2-3  Next INR check on 2/6  Cont coumadin 5 mg 1 tab po daily  Cont plavix 75 mg 1 tab po daily  Cont atorvastatin 40 mg 1 tab po daily  Cont good BP control

## 2023-02-04 NOTE — ASSESSMENT & PLAN NOTE
Pt able to handle coumadin  Cont coumadin 5 mg 1 tab po daily  Goal INR 2-3  2/3/2023 INR 1 83  Next INR 2/6

## 2023-02-04 NOTE — ASSESSMENT & PLAN NOTE
148 / 70 mmHg   Controlled  Goal SBP <130/80  Cont hydrochlorothiazide 12 5 mg 1 tab po daily  Cont lisinopril 40 mg 1 tab po daily  Cont metoprolol 50 mg 1 tab po BID  Cont norvasc 10 mg 1 tab po daily  Cont hydralazine 100 mg 1 tab po TID

## 2023-02-04 NOTE — ASSESSMENT & PLAN NOTE
S/p Rt AKA stump revision on 1/27  Pt had enterococcus facecalis & enterobacter growing in blood cx  O R  cx grew candida albicans  Pt with staples c/d/i  Pt also with posterior lower stump packing intact  Pt still with leukocytosis on cbc (wbc 15 on 2/3)  Pt with prolonged QTC on ekg so pt has limited antifungal options  Pt completed 7 days meropenem & now is on oral doxy for enterobacter  Pt completed 2 weeks iv ampicillin for enterococcal bacteremia    I D  recommends:  Cont doxycyline 100 mg 1 tab po BID for enterobacter isolated in bone  Cont augmentin 875 mg 1 tab po BID for enterococcus  Cont Cresemba 372 mg TID x 2 days followed by 372 mg daily till 7/27/2023    Pt needs 6 weeks of antibiotic till 3/9/2023  I D   F/u 2 weeks  Weekly CBC with diff & CMP

## 2023-02-04 NOTE — ASSESSMENT & PLAN NOTE
1/31/2022 EKG  QTC Interval ms 511    Check periodic ekg as pt on amiodarone, doxy, zoloft & seroquel as per the hospital

## 2023-02-04 NOTE — ASSESSMENT & PLAN NOTE
S/p Lt AKA 11/23/2022  S/p Rt AKA wound debridgement 12/1/2022  S/p Rt aka wound/stump washout & vac placement 12/16/2022  S/p rt aka wound/stump vac change 12/19/2022  S/p lt aka wound/stump washout/packing 12/19/2022 12/19/2022 wound cx grew enterobacter cloacae  1/27/2023 s/p Rt amputation revision stump    Cont atorvastatin 40 mg 1 tab po QHS  Cont clopidogrel 75 mg 1 tab po daily  Cont coumadin 5 mg 1 tab po daily (goal INR 2-3)  Cont oxycodone 5 mg 1 tab po q4 hours prn severe pain  Cont gabapentin 200 mg 1 tab po BID  Pt on methacarbamol 1000 mg 1 tab po TID prn muscle spasms/pain

## 2023-02-04 NOTE — ASSESSMENT & PLAN NOTE
Lab Results   Component Value Date    HGBA1C 9 8 (H) 10/25/2022   Cont lantus 20 units SQ QH  TRULICITY 0 92NM/3 5HO INJ PEN INJECT 0 5ML (0 75MG) SQ EVERY WEEK ON TUESDAY

## 2023-02-05 ENCOUNTER — TELEPHONE (OUTPATIENT)
Dept: OTHER | Facility: OTHER | Age: 63
End: 2023-02-05

## 2023-02-06 ENCOUNTER — NURSING HOME VISIT (OUTPATIENT)
Dept: GERIATRICS | Facility: OTHER | Age: 63
End: 2023-02-06

## 2023-02-06 ENCOUNTER — TELEPHONE (OUTPATIENT)
Dept: INFECTIOUS DISEASES | Facility: CLINIC | Age: 63
End: 2023-02-06

## 2023-02-06 DIAGNOSIS — M86.9 OSTEOMYELITIS (HCC): ICD-10-CM

## 2023-02-06 DIAGNOSIS — R94.31 PROLONGED Q-T INTERVAL ON ECG: Primary | ICD-10-CM

## 2023-02-06 DIAGNOSIS — M86.151 OTHER ACUTE OSTEOMYELITIS OF RIGHT FEMUR (HCC): ICD-10-CM

## 2023-02-06 DIAGNOSIS — D75.839 THROMBOCYTOSIS: ICD-10-CM

## 2023-02-06 DIAGNOSIS — I63.9 CEREBROVASCULAR ACCIDENT (CVA), UNSPECIFIED MECHANISM (HCC): ICD-10-CM

## 2023-02-06 DIAGNOSIS — E08.621: Primary | ICD-10-CM

## 2023-02-06 DIAGNOSIS — Z71.89 COMPLEX CARE COORDINATION: Primary | ICD-10-CM

## 2023-02-06 DIAGNOSIS — L97.409: Primary | ICD-10-CM

## 2023-02-06 DIAGNOSIS — R74.01 TRANSAMINITIS: ICD-10-CM

## 2023-02-06 NOTE — TELEPHONE ENCOUNTER
Confirmed orders and weekly labs with Cm Diaz  She also confirms f/u with us  No questions at this time

## 2023-02-06 NOTE — PROGRESS NOTES
[facility]  [address]  [phone]) [facility] [address] [facility]  POS 32      NAME: Wayne Pardo  AGE: 65 y o  SEX: female [id number]    DATE OF ENCOUNTER: 2/6/2023    Assessment and Plan     1  Prolonged Q-T interval on ECG        2  Other acute osteomyelitis of right femur ([facility] )        3  Thrombocytosis        4  Transaminitis        5  Cerebrovascular accident (CVA), unspecified mechanism ([facility] [address] )           Prolonged Q-T interval on ECG  Resolved on ekg done via trident  QTc 436  Restart seroquel as pt had hallucinations  Pt had seen a person next to the RN  Check ekg in 1 week  Osteomyelitis ([facility] [address] )  S/p Rt AKA stump on 1/27  Wbc 11 5 on 2/6  Pt on doxycycline 100 mg 1 tab po BID for enterobacter in bone till 3/9/2023  Pt on augmentin 875 mg 1 tab po BID for enterococcus till 3/9/2023  Cont Cresemba 372 mg po daily till 7/27/2023  Check weekly CBC with diff, cmp for I D , (and INR) on Mondays    Thrombocytosis  plt count 500  Likely reactive  Was 394 on 2/3/2023; avg trend 400s  Continue to monitor  Recheck on Mon 2/13    Transaminitis  41 ast  34 alt  Monitor trend  Could be due to polypharmacy  Recheck on Mon 2/13    CVA (cerebral vascular accident) ([facility] [address] )  Pt with hx Rt CVA on 11/14  INR was 1 83 on 2/3  Pt got coumadin 6 mg  INR 1 7 on 2/6  Increase coumadin & recheck INR on Thurs 2/9  Goal INR 2-3  Cont plavix 75 mg 1 tab po daily  Cont atorvastatin 40 mg 1 tab po daily  Cont good BP control       Code status: CPR    Chief Complaint     Routine short term follow-up visit  History of Present Illness   Pt seen and examined as part of short term f/u visit  Pt initially was hallucinating upon admission  Today pt more alert and oriented  Pt says her pain is controlled  Encouraged pt to stay positive  Pt requesting glucerna  She says she lost a lot of weight in the past month and would like to take glucerna      The following portions of the patient's history were reviewed and updated as appropriate: allergies, current medications, past family history, past medical history, past social history, past surgical history and problem list     Review of Systems     Review of Systems   All other systems reviewed and are negative      Active Problem List     Patient Active Problem List   Diagnosis   • Esophageal reflux   • DM (diabetes mellitus) type II, controlled, with peripheral vascular disorder ([facility])   • Class 3 severe obesity due to excess calories with serious comorbidity and body mass index (BMI) of 40 0 to 44 9 in adult [facility])   • Depression, recurrent ([place] [address] )   • Anxiety   • Hypertension   • Stroke [facility])   • Diabetic peripheral neuropathy ([place] [address] )   • Vitamin D deficiency   • Systolic murmur   • Familial combined hyperlipidemia   • Arthritis   • Transaminitis   • Acute colitis   • Moderate protein-calorie malnutrition ([facility])   • 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   • 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 ([facility])   • Type 2 diabetes mellitus with hyperglycemia, with long-term current use of insulin ([facility])   • Hyperparathyroidism ([place] [address] )   • Epigastric pain   • Wound of lower extremity   • Non healing left heel wound   • Hypertensive urgency   • Hypokalemia   • UTI (urinary tract infection) due to Enterococcus   • PAD (peripheral artery disease) ([facility])   • Generalized edema due to fluid overload   • Anemia   • Diabetic ulcer of heel associated with diabetes mellitus due to underlying condition ([facility])   • HIT (heparin-induced thrombocytopenia)   • CVA (cerebral vascular accident) ([place] [address] )   • Diarrhea   • Mild protein-calorie malnutrition ([place] [address] )   • History of CVA (cerebrovascular accident)   • Sepsis due to urinary tract infection ([place] [address] )   • Fall   • Cardiac arrest ([place] [address] )   • Troponin level elevated   • Hypomagnesemia   • Bacteremia   • Forehead laceration   • Osteomyelitis ([facility])   • Altered mental status   • Prolonged Q-T interval on ECG   • Thrombocytosis       Objective     Vitals: Reviewed in [employer] system  VSS    General: Awake, alert & oriented to place & time  Head: atraumatic, normocephalic  Cardiovascular: RRR  Lungs: Clear to auscultation bilaterally  Abdomen: nontender/nondistended, +BS  Legs: no cyanosis, clubbing; Lt aka, Rt aka stump staples c/d/i  Skin: clean, dry, intact  Psych: calm, cooperative, appears more back to her baseline    Pertinent Laboratory/Diagnostic Studies:  Refer to facility chart  2/6/2023: HEMOGLOBIN 9 3  L 11 5 - 14 5 g/dL         HEMATOCRIT 29 0  L 35 0 - 43 0 %       WBC 11 5  H 4 0 - 10 0 thou/cmm       RBC 3 44  L 3 70 - 4 70 mill/cmm       PLATELET COUNT 092  M        COMP METAB PANEL         GLUCOSE 93 65 - 99 mg/dL       BUN 15 7 - 25 mg/dL       CREATININE 0 72 0 40 - 1 10 mg/dL       SODIUM 139 135 - 145 mmol/L       POTASSIUM 4 3 3 5 - 5 2 mmol/L       CHLORIDE 109 100 - 109 mmol/L       CARBON DIOXIDE 19  L 23 - 31 mmol/L       CALCIUM 8 6 8 5 - 10 1 mg/dL       ALKALINE PHOSPHATASE 380  H 35 - 120 U/L       ALBUMIN 2 1  L 3 5 - 4 8 g/dL       BILIRUBIN,TOTAL 0 2 0 2 - 1 0 mg/dL          Use of this assay is not recommended for patients undergoing treatment          with eltrombopag due to the potential for falsely elevated results       PROTEIN, TOTAL 6 1  L 6 3 - 8 3 g/dL       AST 41  H <41 U/L       ALT 34 <56 U/L       ANION GAP 11        PT WITH INR         PT 20 3  H 12 0 - 14 6 sec       PT INR 1 7            Results: Normal sinus rhythm Heart rate 78 bpm Normal WI duration Normal QRS duration QRS axis -13 degrees No prior infarct pattern Misplacement of leads V2 No ST-T wave changes to suggest ischemia or injury pattern Normal QTC manually measured using Bazett's formula at 436 ms Otherwise normal tracing  Borderline ECG  Intervals: HR: 78 bpm WI: 180 ms QRS: 80 ms QT: 380 ms RR: 760 ms QTC: 436 ms      Current Medications   Medications reviewed and updated in facility chart      Shania Carnes Melvi Ross MD  Internal Medicine  Senior Care Physician

## 2023-02-07 ENCOUNTER — TELEPHONE (OUTPATIENT)
Dept: INFECTIOUS DISEASES | Facility: CLINIC | Age: 63
End: 2023-02-07

## 2023-02-07 ENCOUNTER — TELEPHONE (OUTPATIENT)
Dept: VASCULAR SURGERY | Facility: CLINIC | Age: 63
End: 2023-02-07

## 2023-02-07 PROBLEM — D75.839 THROMBOCYTOSIS: Status: ACTIVE | Noted: 2023-02-06

## 2023-02-07 NOTE — UTILIZATION REVIEW
NOTIFICATION OF ADMISSION DISCHARGE   This is a Notification of Discharge from [address]  Please be advised that this patient has been discharge from our facility  Below you will find the admission and discharge date and time including the patient’s disposition  UTILIZATION REVIEW CONTACT:  Jeff Tejada  Utilization   Network Utilization Review Department  Phone: [phone] x carefully listen to the prompts  All voicemails are confidential   Email: [email or web address]     ADMISSION INFORMATION  PRESENTATION DATE: 1/17/2023  3:48 PM  OBERVATION ADMISSION DATE:   INPATIENT ADMISSION DATE: 1/17/23  5:54 PM   DISCHARGE DATE: 2/3/2023  1:43 PM   DISPOSITION:Non [facility] SNF/TCU/SNU    IMPORTANT INFORMATION:  Send all requests for admission clinical reviews, approved or denied determinations and any other requests to dedicated fax number below belonging to the campus where the patient is receiving treatment   List of dedicated fax numbers:  [address] DENIALS (Administrative/Medical Necessity) [phone]   [address] (Maternity/NICU/Pediatrics) [phone]   [facility] [phone]   [facility] 87 [phone]   [address] [phone]   220 [facility] [phone] 90 [facility] [phone]   [address] [place], [place] [address] 119 [phone]   [facility]  [phone] 4058 [facility] [phone]   412 [facility] 850 [address] [facility] [phone]

## 2023-02-07 NOTE — TELEPHONE ENCOUNTER
Vascular Nurse Navigator Post Op Call    Procedure:  Right - AMPUTATION REVISION STUMP  TRANSESOPHAGEAL ECHOCARDIOGRAM (LEON)- see cardiology note    Date of Procedure:  1/27/23    Surgeon:  Panel 1:     * Winter Nice DO - Primary     * Karen Moreno MD - Assisting  Panel 2:     * Naty Mckeon MD - Primary (cardiology)    Discharge Date:  2/3/23    Discharge Disposition: [address] [place] [address]    Chest Pain?:No    Shortness of Breath?:No     Increased Pain, Swelling, Warmth, or Redness? :No    Purulent Drainage?:No    Foul- smelling drainage?: No    Signs of dehiscence?:No    Fever/Chills? :No    Bleeding?:No    Anticoagulation pt was discharged on post op?: Warfarin (Coumadin or Jantoven) and Clopidogrel (Plavix)    Statin pt was discharged on post op?: Lipitor (atorvastatin)    Uncontrolled Pain?:No      Reviewed discharge instructions and incision care with patient  NEXT OFFICE VISIT SCHEDULED:  3/9/23 at 1:45 pm with Dr Kai Harrison at [facility] [address] [place] [address]    Transportation:  Yes, facility to arrange transportation      Any further questions/concerns? Spoke with Henrry Kenyon, nurse at [facility], in regards to above  She stated that patient's incision looks good and without any signs of infection  She stated that left AKA incision is well healed and without any open natalie, redness, or drainage  All questions answered  No concerns expressed at this time

## 2023-02-07 NOTE — ASSESSMENT & PLAN NOTE
Resolved on ekg done via trident  QTc 436  Restart seroquel as pt had hallucinations  Pt had seen a person next to the RN  Check ekg in 1 week

## 2023-02-07 NOTE — TELEPHONE ENCOUNTER
Patient cannot get a ride to our office as scheduled  Spoke with Irvin Contreras, expressed to her about the expectation of follow up within two weeks of DC  Unfortunately they cannot accommodate  R/S with Brody Taylor in [facility] for 2/27  Patient on Doxy bid,Augmentin bid, Cresemba daily 372mg  Polymicrobial Bacteremia ; rt amputation stump osteo/abscess  Cathy is attending physician for patient case

## 2023-02-07 NOTE — ASSESSMENT & PLAN NOTE
plt count 500  Likely reactive  Was 394 on 2/3/2023; avg trend 400s  Continue to monitor  Recheck on Mon 2/13

## 2023-02-07 NOTE — ASSESSMENT & PLAN NOTE
Pt with hx Rt CVA on 11/14  INR was 1 83 on 2/3  Pt got coumadin 6 mg  INR 1 7 on 2/6  Increase coumadin & recheck INR on Thurs 2/9  Goal INR 2-3  Cont plavix 75 mg 1 tab po daily  Cont atorvastatin 40 mg 1 tab po daily  Cont good BP control

## 2023-02-07 NOTE — ASSESSMENT & PLAN NOTE
S/p Rt AKA stump on 1/27  Wbc 11 5 on 2/6  Pt on doxycycline 100 mg 1 tab po BID for enterobacter in bone till 3/9/2023  Pt on augmentin 875 mg 1 tab po BID for enterococcus till 3/9/2023  Cont Cresemba 372 mg po daily till 7/27/2023  Check weekly CBC with diff, cmp for I D , (and INR) on Mondays

## 2023-02-08 NOTE — DISCHARGE SUMMARY
[address] [facility]  Discharge- [address] [facility] [address] 1960, 65 y o  female MRN: [id number]  Unit/Bed#: [place] [address] -01 Encounter: [id number]  Primary Care Provider: ALEX Nixon   Date and time admitted to hospital: 1/17/2023  3:48 PM    Altered mental status  Assessment & Plan  Improved, patient currently alert and oriented X3    Osteomyelitis ([facility] )  Assessment & Plan  S/p right - amputation revision stump    Bacteremia  Assessment & Plan  Polymicrobial bacteremia (Enterobacter / Enterococcus)  · Antibiotic management per infectious disease  Currently on meropenem / Ampicillin  · Last blood cultures negative on 1/21  · To transition to Augmentin and doxycycline on 2/2; continue antibiotic treatment through 3/1    Hypomagnesemia  Assessment & Plan  Replete prn    Cardiac arrest [facility])  Assessment & Plan  Cardiac arrest (1/18/23) presumably secondary to electrolyte abnormalities and/or severe sepsis  · Cardiology recommendations appreciated  · Continue with amiodarone, Norvasc, hydralazine, lisinopril, metoprolol  · Of note, she is on twice daily amiodarone  Will defer to cardiology when it would be appropriate to adjust her dose  Fall  Assessment & Plan  Presents from facility with altered mental status, status post fall  Trauma scans negative     History of CVA (cerebrovascular accident)  Assessment & Plan  Patient had stroke on 11/14 right hemispheric, with residual left-sided weakness  HIT (heparin-induced thrombocytopenia)  Assessment & Plan  History of HIT   On warfarin  Goal INR 2-3    Patient INR trending down with 3 mg; have reinitiated 5 mg; likely 4 mg for outpatient    Recent Labs     02/01/23  0509 02/03/23  0529   INR 2 01* 1 83*         Anemia  Assessment & Plan  Hemoglobin continues to improve; 8 4 on morning labs  Monitor and transfuse for hemoglobin of 7 or less    PAD (peripheral artery disease) ([facility] )  Assessment & Plan  S/p L AKA 11/23/22   S/p R AKA, wound debridement 12/1/22   S/p R AKA wound/stump washout and VAC placement 12/16/22   S/p R AKA wound/stump VAC change, L AKA stump washout/packing 12/19/22   Wound Cx 12/19/22: enterobacter cloacae     During this admission  · S/p Right - AMPUTATION REVISION STUMP 1/27/2023            Hypokalemia  Assessment & Plan  Replete PRN; possibly related to hyperaldosteronism? 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 [facility])  Assessment & Plan  Lab Results   Component Value Date    HGBA1C 9 8 (H) 10/25/2022       Recent Labs     02/02/23  1122 02/02/23  1652 02/02/23  2159 02/03/23  0629   POCGLU 126 134 163* 90       Blood Sugar Average: Last 72 hrs:  (P) 99 1875     Home regimen: Lantus 60U HS, Novolog 2oU TID prior to meals  Inpatient regimen: Lantus 20UHS, sliding scale  Moderate protein-calorie malnutrition ([facility] [address] )  Assessment & Plan  Malnutrition Findings:                                 BMI Findings: Body mass index is 27 29 kg/m²  Nutrition and speech therapy consult    Hypertension  Assessment & Plan  Patient currently on amlodipine, hydralazine, metoprolol and lisinopril  Blood pressure noted to be highly elevated; increased hydralazine to 50 mg 3 times daily on 1/31  Initially improved, however blood pressure noted to be highly elevated overnight; increased metoprolol to 50 mg twice daily  Additionally, will add HCTZ 12 5 mg daily; monitor overnight    Despite increasing medications, blood pressure is unfazed and appears to actually be increasing  Reviewed chart and noted the blood pressure in the 160s 170s from all her recent primary care visits  We will rule out secondary causes and have ordered aldosterone, renin, metanephrines, and renal Doppler        Esophageal reflux  Assessment & Plan  Continue PPI     * Sepsis due to urinary tract infection ([facility] [address] [address] )  Assessment & Plan  She presented with altered mental status, generalized weakness, and fall secondary to sepsis  Etiology secondary to complicated UTI , polymicrobial bacteremia, and 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 further worsened by her cardiac arrest  She is S/p right amputation revision stump  · Antibiotic management per infectious disease  Currently on Augmentin and doxycycline for osteo through 3/1  Discharging Physician / Practitioner: Danii Og MD  PCP: [facility]  Admission Date:   Admission Orders (From admission, onward)     Ordered        01/17/23 1754  INPATIENT ADMISSION  Once                      Discharge Date: 02/03/23    Medical Problems     Resolved Problems  Date Reviewed: 2/7/2023          Resolved    Acute respiratory failure with hypoxia ([facility] [address] ) 1/23/2023     Resolved by  Caitlin Chisholm MD    Febrile 1/24/2023     Resolved by  Caitlin Chisholm MD          Consultations During Hospital Stay:  · Infectious Disease  · Vascular Surgery  · General Surgery  · Palliative care    Procedures Performed:   · VAS renal Artery  · CT lower ext right  · CT abd Pelvis  · CT lower ext right and left  · CT head  · CT facial bones  · CT cervical spine  · CT CAP    Significant Findings / Test Results:   · Fall with facial laceration and bruising  · Sepsis - right amputation stump and UTI as source  · Osteomyelitis - right amputation stump  · AMS    Incidental Findings:   · Resistant Hypertention     Test Results Pending at Discharge (will require follow up):   · none     Outpatient Tests Requested:  · CBC/CMP  · Blood Pressure Checks    Complications:  None    Reason for Admission: [address] Course:      As per HPI:    "Mik Jorgensen is a 65 y o  female with a PMH of peripheral vascular disease, with recent prolonged hospitalization complicated by HIT requiring bilateral AKA, stroke, diabetes who presents with altered mental status and fall from facility  In the emergency department the patient was found to have sepsis suspected due to UTI with patient meeting SIRS criteria with tachycardia, leukocytosis and fever  The patient is unable to provide history due to altered mental status  Fortunately her trauma scans in the emergency department are negative "    Condition at Discharge: stable     Discharge Day Visit / Exam:       Vitals: Blood Pressure: 166/79 (02/03/23 0736)  Pulse: 79 (02/03/23 0736)  Temperature: 98 °F (36 7 °C) (02/03/23 0736)  Temp Source: Oral (02/03/23 0736)  Respirations: 19 (02/03/23 0736)  Height: 5' 4" (162 6 cm) (01/27/23 1530)  Weight - Scale: 72 1 kg (159 lb) (01/27/23 1530)  SpO2: 96 % (02/03/23 0736)  Exam:   Physical Exam  Vitals and nursing note reviewed  Constitutional:       General: She is not in acute distress  Appearance: She is well-developed  HENT:      Head: Normocephalic and atraumatic  Eyes:      Conjunctiva/sclera: Conjunctivae normal    Cardiovascular:      Rate and Rhythm: Normal rate and regular rhythm  Heart sounds: No murmur heard  Pulmonary:      Effort: Pulmonary effort is normal  No respiratory distress  Breath sounds: Normal breath sounds  Abdominal:      Palpations: Abdomen is soft  Tenderness: There is no abdominal tenderness  Musculoskeletal:         General: Tenderness, deformity and signs of injury present  No swelling  Cervical back: Neck supple  Comments: Bilateral AKA   Skin:     General: Skin is warm and dry  Findings: Erythema and rash present  Neurological:      Mental Status: She is alert  Psychiatric:         Mood and Affect: Mood normal          Discussion with Family: Discussed treatment plan with family and patient who agree with current plan; encouraged to ask questions and participate        Discharge instructions/Information to patient and family:   See after visit summary for information provided to patient and family  Provisions for Follow-Up Care:  See after visit summary for information related to follow-up care and any pertinent home health orders  Disposition:     Acute Rehab at [facility] Readmission: None     Discharge Statement:  I spent 45 minutes discharging the patient  This time was spent on the day of discharge  I had direct contact with the patient on the day of discharge  Greater than 50% of the total time was spent examining patient, answering all patient questions, arranging and discussing plan of care with patient as well as directly providing post-discharge instructions  Additional time then spent on discharge activities  Discharge Medications:  See after visit summary for reconciled discharge medications provided to patient and family        ** Please Note: This note has been constructed using a voice recognition system **

## 2023-02-09 ENCOUNTER — PATIENT OUTREACH (OUTPATIENT)
Dept: FAMILY MEDICINE CLINIC | Facility: CLINIC | Age: 63
End: 2023-02-09

## 2023-02-09 LAB — ALDOST SERPL-MCNC: 1.9 NG/DL (ref 0–30)

## 2023-02-09 NOTE — PROGRESS NOTES
Pls verify that she is seeing the facility providers for her care   If so, then please remove me from PCP

## 2023-02-09 NOTE — PROGRESS NOTES
Outpatient Care Management Note:    New HRR referral received  Care manager called [facility], [phone]  Lucie Grove is an inpatient in their facility  Voice mail message left for RUY Story, with my contact information, checking to see if Lucie Grove is a rehab patient or long term resident  Call received from [facility]  Lucie Grove is a long term resident at [facility]  They report there is no discharge plan for her  Cm will close the referral since they will be managing her care needs

## 2023-02-10 ENCOUNTER — NURSING HOME VISIT (OUTPATIENT)
Dept: GERIATRICS | Facility: OTHER | Age: 63
End: 2023-02-10

## 2023-02-10 ENCOUNTER — TELEPHONE (OUTPATIENT)
Dept: NEUROLOGY | Facility: CLINIC | Age: 63
End: 2023-02-10

## 2023-02-10 DIAGNOSIS — I63.9 CEREBROVASCULAR ACCIDENT (CVA), UNSPECIFIED MECHANISM (HCC): Primary | ICD-10-CM

## 2023-02-10 DIAGNOSIS — M86.151 OTHER ACUTE OSTEOMYELITIS OF RIGHT FEMUR (HCC): ICD-10-CM

## 2023-02-10 DIAGNOSIS — R94.31 PROLONGED Q-T INTERVAL ON ECG: ICD-10-CM

## 2023-02-10 DIAGNOSIS — I10 PRIMARY HYPERTENSION: ICD-10-CM

## 2023-02-10 LAB
METANEPH FREE SERPL-MCNC: 22.9 PG/ML (ref 0–88)
NORMETANEPHRINE SERPL-MCNC: 100.7 PG/ML (ref 0–285.2)
RENIN PLAS-CCNC: 0.35 NG/ML/HR (ref 0.17–5.38)

## 2023-02-10 NOTE — ASSESSMENT & PLAN NOTE
Pt with hx CVA on 11/14  Goal INR 2-3  INR 2 5 on 2/9/2023  Cont coumadin 5 mg  Check on Mondays with regular labwork as pt on antibiotics that can alter INR levels    Goal SBP <130/80  Cont plavix 75 mg 1 tab po daily  Cont atorvastatin 40 mg 1 tab po daily

## 2023-02-10 NOTE — ASSESSMENT & PLAN NOTE
S/p Rt AKA revision on 1/27  Overall site healing well; staples c/d/i; area of wound crusting over  Cont doxycycline 100 mg 1 tab po BID till 3/9/2023  Cont augmentin 875 mg 1 tab po BID till 3/9/2023  Cont Cresemba 372 mg 1 tab po daily till 7/27/2023  Check weekly CBC with diff, cmp for I D  & INR on mondays    Per [address]:    "[facility] [phone] -F/U 3/9/23 P/U 1348"    "Infectious Disease - [facility] [phone]   F/U 2/27/23 P/U 1139"

## 2023-02-10 NOTE — TELEPHONE ENCOUNTER
SCHEDULED: Fri, 4/14/23 at 10:30am w/ Dr Claudia Rossi in [place]  Called and spoke with Dolores Spurling at [facility] in [facility]  She accepted appt date/time/address  She has our phone number  Added pt to high priority wait list       HFU/SLA 10/21/22 - 1/12/23/STROKE          NOTES: Briggs Pedroza will need follow up in in 6 weeks with neurovascular attending  She will not require outpatient neurological testing, at this time

## 2023-02-10 NOTE — PROGRESS NOTES
[facility]  Genny Taylor 80  [phone]) [facility] [address] [facility]  [address]      NAME: Gopal Ha  AGE: 65 y o  SEX: female [phone]    DATE OF ENCOUNTER: 2/10/2023    Assessment and Plan     1  Cerebrovascular accident (CVA), unspecified mechanism ([facility] )        2  Primary hypertension        3  Other acute osteomyelitis of right femur ([facility] )        4  Prolonged Q-T interval on ECG           Stroke ([facility] )  Pt with hx CVA on 11/14  Goal INR 2-3  INR 2 5 on 2/9/2023  Cont coumadin 5 mg  Check on Mondays with regular labwork as pt on antibiotics that can alter INR levels    Goal SBP <130/80  Cont plavix 75 mg 1 tab po daily  Cont atorvastatin 40 mg 1 tab po daily    Hypertension  Goal SBP <130/80  146 / 73 mmHg 2/10/2022 10:04    2/9/2023 21:25 167 / 83 mmHg    2/9/2023 18:28 174 / 77 mmHg    2/9/2023 09:35 164 / 70 mmHg    2/8/2023 20:39 171 / 76 mmHg    2/8/2023 16:48 168 / 77 mmHg    2/8/2023 09:09 148 / 67 mmHg    2/7/2023 21:01 144 / 76 mmHg    2/7/2023 16:50 150 / 78 mmHg    2/7/2023 10:25 152 / 70 mmHg    Cont metoprolol 50 mg 1 tab po BID  Cont hydralazine 100 mg 1 tab po TID  Cont lisinopril 40 mg 1 tab po daily  Cont amlodipine 10 mg 1 tab po daily        Osteomyelitis (HCC)  S/p Rt AKA revision on 1/27  Overall site healing well; staples c/d/i; area of wound crusting over  Cont doxycycline 100 mg 1 tab po BID till 3/9/2023  Cont augmentin 875 mg 1 tab po BID till 3/9/2023  Cont Cresemba 372 mg 1 tab po daily till 7/27/2023  Check weekly CBC with diff, cmp for I D  & INR on mondays    Per [address]:    "[facility] Dr Lila Hagen [phone] -F/U 3/9/23 P/U 1345"    "Infectious Disease [facility] [phone]   F/U 2/27/23 P/U 1130"      Prolonged Q-T interval on ECG  Most recent   Check next week  Pt restarted on seroquel given recent hallucinations  Pt's mood appears better today  RAD RN noted some difficulty focusing on reading  Cont seroquel for now       Code status: full    Chief Complaint     Routine short term follow-up visit  History of Present Illness   Pt seen and examined as part of [facility] residents rounds  RAD RN noted pt had trouble focusing on reading her paper  Went to examine pt  She is A&Ox2-3  Knows Feb 2023, just not exact date  Pt denies any pain right now  Pt appears at baseline  The following portions of the patient's history were reviewed and updated as appropriate: allergies, current medications, past family history, past medical history, past social history, past surgical history and problem list     Review of Systems     Review of Systems   All other systems reviewed and are negative        Active Problem List     Patient Active Problem List   Diagnosis   • Esophageal reflux   • DM (diabetes mellitus) type II, controlled, with peripheral vascular disorder ([facility])   • Class 3 severe obesity due to excess calories with serious comorbidity and body mass index (BMI) of 40 0 to 44 9 in adult [facility])   • Depression, recurrent ([facility])   • Anxiety   • Hypertension   • Stroke [facility])   • Diabetic peripheral neuropathy ([facility] )   • Vitamin D deficiency   • Systolic murmur   • Familial combined hyperlipidemia   • Arthritis   • Transaminitis   • Acute colitis   • Moderate protein-calorie malnutrition ([facility])   • 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   • 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 ([facility])   • Type 2 diabetes mellitus with hyperglycemia, with long-term current use of insulin ([facility])   • Hyperparathyroidism ([facility] )   • Epigastric pain   • Wound of lower extremity   • Non healing left heel wound   • Hypertensive urgency   • Hypokalemia   • UTI (urinary tract infection) due to Enterococcus   • PAD (peripheral artery disease) ([facility])   • Generalized edema due to fluid overload   • Anemia   • Diabetic ulcer of heel associated with diabetes mellitus due to underlying condition ([facility] )   • HIT (heparin-induced thrombocytopenia)   • CVA (cerebral vascular accident) ([facility] [address] )   • Diarrhea   • Mild protein-calorie malnutrition ([facility] )   • History of CVA (cerebrovascular accident)   • Sepsis due to urinary tract infection [facility])   • Fall   • Cardiac arrest ([facility] )   • Troponin level elevated   • Hypomagnesemia   • Bacteremia   • Forehead laceration   • Osteomyelitis (HCC)   • Altered mental status   • Prolonged Q-T interval on ECG   • Thrombocytosis       Objective     Vitals: Reviewed in [employer] system  VSS    General: Awake, alert; knows it is Feb 2023; did not remember exact day; knows at [facility]: atraumatic, normocephalic  Cardiovascular: RRR  Lungs: Clear to auscultation bilaterally  Abdomen: nontender/nondistended, +BS  Legs: no cyanosis, clubbing; Lt aka stump c/d/i; Rt aka stump staples c/d/i; area 1/2 way crusted over; pt has packing too RN managing  Skin: clean, dry, intact  Psych: calm, cooperative    Pertinent Laboratory/Diagnostic Studies:  Refer to facility chart  Current Medications   Medications reviewed and updated in facility chart      Acetaminophen Suppository 650 MG Insert 1 suppository rectally every 4 hours as needed for Mild Pain Max 3 GM APAP / 24 HR, Give Suppository If Unable to Take By Mouth AND Insert 1 suppository rectally every 4 hours as needed for Temperature = or > 100 degrees Oral / 101 degrees Rectal Max 3 GM APAP / 24 HR, Give Suppository If Unable to Take By Mouth   Milk of Magnesia Suspension 2400 MG/30ML Give 30 ml by mouth as needed for Constipation 1 Time a Day, If No BM on by Day # 2, Evening Nurse Gives a Laxative on Day # 2 AND Give 30 ml by mouth as needed for Constipation 1 Time a Day, If No BM on by Day # 3, Evening Nurse Gives a Laxative on Day # 3   Bisac-Evac Suppository 10 MG (Bisacodyl) Insert 1 suppository rectally as needed for Constipation 1 Time a Day, If NO BM by AM Day # 4, Day Nurse Gives a Suppository that AM   Enema Disposable Enema (Sodium Phosphates) Insert 1 application rectally as needed for Constipation 1 Time a Day, If NO BM within 3-5 Hours of Suppository Insertion Give Enema   AMLODIPINE 10MG TABLET GIVE 1 TABLET BY MOUTH ONCE DAILY(Related Diagnoses: ESSENTIAL (PRIMARY) HYPERTENSION (I10))   ATORVASTATIN 40MG TAB GIVE 1 TABLET BY MOUTH ONCE DAILY(Related Diagnoses: OTHER HYPERLIPIDEMIA (E78 49))(Indications for Use: hyperlipidemia)   BUPROPION XL 150MG TABLET GIVE 1 TABLET BY MOUTH ONCE DAILY *DO NOT CRUSH*(Related Diagnoses: ANXIETY DISORDER, UNSPECIFIED (F41 9))   CLOPIDOGREL 75MG TABLET GIVE 1 TABLET BY MOUTH ONCE DAILY(Related Diagnoses: ENCOUNTER FOR ORTHOPEDIC AFTERCARE FOLLOWING SURGICAL AMPUTATION (Z47 81))(Indications for Use: anticoagulant)   GABAPENTIN 100MG CAPSULE GIVE 2 TABLETS (200MG) BY MOUTH TWICE DAILY(Related Diagnoses: TYPE 2 DIABETES MELLITUS WITH DIABETIC POLYNEUROPATHY (E11 42))(Indications for Use: neuropathy)   LISINOPRIL 40 MG TABLET GIVE 1 TABLET BY MOUTH ONCE DAILY(Related Diagnoses: ESSENTIAL (PRIMARY) HYPERTENSION (I10))   MULTIVITAMIN TABLET GIVE 1 TABLET BY MOUTH ONCE DAILY(Related Diagnoses: VITAMIN DEFICIENCY, UNSPECIFIED (E56 9))(Indications for Use: supplement)   SERTRALINE 100MG TABLET GIVE 1 TABLET BY MOUTH ONCE DAILY AT BEDTIME(Related Diagnoses: MAJOR DEPRESSIVE DISORDER, RECURRENT, UNSPECIFIED (F33 9))(Indications for Use: depression)   TRULICITY 4 23HN/1 8JQ INJ PEN INJECT 0 5ML (0 75MG) SQ EVERY WEEK ON TUESDAY *REFRIGERATE UNTIL OPEN, THEN 14 DAYS AT ROOM TEMPERATURE*(Related Diagnoses: TYPE 2 DIABETES MELLITUS WITH DIABETIC POLYNEUROPATHY (E11 42))(Indications for Use: DM)   METHOCARBAMOL 500MG TABLET GIVE 2 TABLETS (1000MG) BY MOUTH THREE TIMES DAILY AS NEEDED FOR MUSCLE PAIN(Related Diagnoses: PAIN, UNSPECIFIED (R52))(Indications for Use: spasms)   Remedy Phytoplex Z-Guard Paste Apply to B/L buttocks topically every shift for water-resistant barrier paste apply QS, dx: MASD   AMOXICILLIN/CLAV 875-125MG TAB GIVE 1 TABLET BY MOUTH EVERY 12 HOURS FOR 69 DOSES FOR SEPSIS(Related Diagnoses: SEPSIS, UNSPECIFIED ORGANISM (A41 9))   DOXYCYCLINE 100MG CAPSULE GIVE 1 CAPSULE BY MOUTH EVERY 12 HOURS FOR 69 DOSES FOR SEPSIS(Related Diagnoses: SEPSIS, UNSPECIFIED ORGANISM (A41 9))   HYDRALAZINE 100MG TAB GIVE 1 TABLET BY MOUTH EVERY 8 HOURS FOR HYPERTENSION(Related Diagnoses: ESSENTIAL (PRIMARY) HYPERTENSION (I10))(Indications for Use: HTN)   HYDROCHLOROTHIAZIDE 12 5MG TAB GIVE 1 TABLET BY MOUTH ONCE DAILY FOR HYPERTENSION(Related Diagnoses: ESSENTIAL (PRIMARY) HYPERTENSION (I10))(Indications for Use: HTN)   LANTUS SOLOSTAR 100UNITS/ML INJECT 20 UNITS SQ ONCE DAILY FOR DIABETES MELLITUS *REFRIGERATE UNTIL OPEN, THEN 28 DAYS AT ROOM TEMPERATURE*(Related Diagnoses: TYPE 2 DIABETES MELLITUS WITH DIABETIC POLYNEUROPATHY (E11 42))(Indications for Use: DM2)   CRESEMBA 186MG CAPSULE GIVE 2 CAPSULES (372MG) BY MOUTH EVERY MORNING FOR FUNGAL INFECTION *DO NOT CRUSH*  (Related Diagnoses: INFECTION OF AMPUTATION STUMP, RIGHT LOWER EXTREMITY (T87 43))(Indications for Use: fungal infection/polymicrobial bacteremia)   SLOW MAG 64MG W/CALCIUM EC TAB GIVE 1 TABLET BY MOUTH ONCE DAILY FOR HYPOMAGNESIUM(Related Diagnoses: HYPOMAGNESEMIA (E83 42))(Indications for Use: hypomagnesemia)   PANTOPRAZOLE 40MG TABLET GIVE 1 TABLET BY MOUTH ONCE DAILY FOR 14 DAYS FOR GASTROESOPHAGEAL REFLUX DISEASE *DO NOT CRUSH*(Related Diagnoses: GASTRO-ESOPHAGEAL REFLUX DISEASE WITHOUT ESOPHAGITIS (K21 9))(Indications for Use: GERD)   VITAMIN B-1 100MG TABLET GIVE 1 TABLET BY MOUTH ONCE DAILY FOR SUPPLEMENT(Related Diagnoses: VITAMIN DEFICIENCY, UNSPECIFIED (E56 9))   AMIODARONE 200MG TABLET GIVE 1 TABLET BY MOUTH ONCE DAILY FOR SYSTOLIC MURMOR(Related Diagnoses: CARDIAC MURMUR, UNSPECIFIED (O74 7))(OIFONGPWGQC for Use: systolic murmur)   METOPROLOL TART 50MG TABLET GIVE 1 TABLET BY MOUTH EVERY 12 HOURS FOR HYPERTENSION * HOLD FOR SYSTOLIC BLOOD PRESSURE <002 OR HEART RATE <55(Related Diagnoses: ESSENTIAL (PRIMARY) HYPERTENSION (I10))(Indications for Use: HTN)   OXYCODONE 5MG TABLET GIVE 1 TABLET BY MOUTH EVERY 4 HOURS AS NEEDED FOR SEVERE PAIN(Related Diagnoses: PAIN, UNSPECIFIED (R52))   ALPRAZOLAM 0 5MG TABLET GIVE 1/2 TABLET (0 25MG) BY MOUTH ONCE DAILY AT BEDTIME AS NEEDED FOR ANXIETY(Related Diagnoses: ANXIETY DISORDER, UNSPECIFIED (F41 9))   QUETIAPINE 25MG TABLET GIVE 1 TABLET BY MOUTH ONCE DAILY AT BEDTIME FOR HALLUCINATIONS(Related Diagnoses: HALLUCINATIONS, UNSPECIFIED (R44 3))   WARFARIN SODIUM 5MG TABLET GIVE 1 TABLET BY MOUTH ONCE DAILY AT BEDTIME FOR PULMONARY EMBOLISM NEXT PT/INR 2/13/23 *HANDLING PRECAUTION: WEAR GLOVES*(Indications for Use: hx of PE)(Additional Directions: GIVE 1 TABLET BY MOUTH ONCE DAILY AT BEDTIME FOR PULMONARY EMBOLISM NEXT PT/INR 2/13/23 *HANDLING PRECAUTION: Niraj Veliz*)         Rodrigo Bojorquez MD  Internal Medicine  Senior Care Physician

## 2023-02-10 NOTE — ASSESSMENT & PLAN NOTE
Goal SBP <130/80  146 / 73 mmHg 2/10/2022 10:04    2/9/2023 21:25 167 / 83 mmHg    2/9/2023 18:28 174 / 77 mmHg    2/9/2023 09:35 164 / 70 mmHg    2/8/2023 20:39 171 / 76 mmHg    2/8/2023 16:48 168 / 77 mmHg    2/8/2023 09:09 148 / 67 mmHg    2/7/2023 21:01 144 / 76 mmHg    2/7/2023 16:50 150 / 78 mmHg    2/7/2023 10:25 152 / 70 mmHg    Cont metoprolol 50 mg 1 tab po BID  Cont hydralazine 100 mg 1 tab po TID  Cont lisinopril 40 mg 1 tab po daily  Cont amlodipine 10 mg 1 tab po daily

## 2023-02-10 NOTE — ASSESSMENT & PLAN NOTE
Most recent   Check next week  Pt restarted on seroquel given recent hallucinations  Pt's mood appears better today  RAD RN noted some difficulty focusing on reading  Cont seroquel for now

## 2023-02-12 ENCOUNTER — TELEPHONE (OUTPATIENT)
Dept: OTHER | Facility: OTHER | Age: 63
End: 2023-02-12

## 2023-02-13 NOTE — PROGRESS NOTES
[facility]  Genny Taylor 80  [phone]) [facility] [address] [facility]  [address]      NAME: Adria Lowe  AGE: 65 y o  SEX: female [phone]    DATE OF ENCOUNTER: 2/14/2023    Assessment and Plan     1  Prolonged Q-T interval on ECG        2  Anemia, unspecified type        3  Other acute osteomyelitis of right femur ([facility] )        4  Cerebrovascular accident (CVA), unspecified mechanism ([place] [address] )           Prolonged Q-T interval on ECG  Reviewed 2/14 EKG  QTc wnl at 404  Cont seroquel  Check next ekg 2/27 to monitor ekg    Anemia  Improving  H/h 9 1/27 6 from 8 2/26    Osteomyelitis ([facility] )  1/27 Rt aka revision  Site healing well; staples c/d/i; area crusting over  Cont doxy till 3/9  Cont augmentin till 3/9  Cont cresemba till 7/27/2023    Stroke ([facility] )  Pt on coumadin  Goal inr 2-3  INR 2 6 on 2/13  Cont coumadin 5 mg 1 tab po daily  Check next INR Mon 2/20 with rest of labs       Code status: full    Chief Complaint     Routine short term follow-up visit  History of Present Illness   Pt seen and examined as part of short term f/u visit  Pt sitting comfortably in special chair waiting to play bingo  No pain right now  Appears calm  The following portions of the patient's history were reviewed and updated as appropriate: allergies, current medications, past family history, past medical history, past social history, past surgical history and problem list     Review of Systems     Review of Systems   All other systems reviewed and are negative        Active Problem List     Patient Active Problem List   Diagnosis   • Esophageal reflux   • DM (diabetes mellitus) type II, controlled, with peripheral vascular disorder (HCC)   • Class 3 severe obesity due to excess calories with serious comorbidity and body mass index (BMI) of 40 0 to 44 9 in adult [facility])   • Depression, recurrent ([place] [address] )   • Anxiety   • Hypertension   • Stroke [facility])   • Diabetic peripheral neuropathy ([place] [address] )   • Vitamin D deficiency   • Systolic murmur   • Familial combined hyperlipidemia   • Arthritis   • Transaminitis   • Acute colitis   • Moderate protein-calorie malnutrition ([facility])   • 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   • 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 ([facility])   • Type 2 diabetes mellitus with hyperglycemia, with long-term current use of insulin ([facility])   • Hyperparathyroidism ([facility] )   • Wound of lower extremity   • Non healing left heel wound   • Hypertensive urgency   • Hypokalemia   • UTI (urinary tract infection) due to Enterococcus   • PAD (peripheral artery disease) ([facility])   • Generalized edema due to fluid overload   • Anemia   • Diabetic ulcer of heel associated with diabetes mellitus due to underlying condition ([facility])   • HIT (heparin-induced thrombocytopenia)   • CVA (cerebral vascular accident) ([facility] )   • Mild protein-calorie malnutrition ([facility] )   • History of CVA (cerebrovascular accident)   • Sepsis due to urinary tract infection ([facility] )   • Cardiac arrest ([facility])   • Troponin level elevated   • Hypomagnesemia   • Bacteremia   • Osteomyelitis ([facility])   • Prolonged Q-T interval on ECG   • Thrombocytosis       Objective     Vitals: Reviewed in [employer] system  VSS    General: Awake, A&Ox3  Head: atraumatic, normocephalic  Cardiovascular: RRR  Lungs: Clear to auscultation bilaterally  Abdomen: nontender/nondistended, +BS  Legs: Pt with Rt aka staples c/d/i; wound healing well; pt also has dressing packed underneath stump; defer to RN note; Lt aka stump c/d/i  Psych: calm, cooperative    Pertinent Laboratory/Diagnostic Studies:  Refer to facility chart      2/13/2023  COMP METAB PANEL         GLUCOSE 92 65 - 99 mg/dL       BUN 27  H 7 - 25 mg/dL       CREATININE 0 60 0 40 - 1 10 mg/dL       SODIUM 141 135 - 145 mmol/L       POTASSIUM 3 8 3 5 - 5 2 mmol/L       CHLORIDE 112  H 100 - 109 mmol/L       CARBON DIOXIDE 23 23 - 31 mmol/L       CALCIUM 8 3  L 8 5 - 10 1 mg/dL       ALKALINE PHOSPHATASE 339  H 35 - 120 U/L       ALBUMIN 2 1  L 3 5 - 4 8 g/dL       BILIRUBIN,TOTAL 0 2 0 2 - 1 0 mg/dL          Use of this assay is not recommended for patients undergoing treatment          with eltrombopag due to the potential for falsely elevated results       PROTEIN, TOTAL 5 8  L 6 3 - 8 3 g/dL       AST 20 <41 U/L       ALT 26 <56 U/L       ANION GAP 6 3 - 11        eGFRcr 102        PT WITH INR         PT 27 4  H 12 0 - 14 6 sec       PT INR 2 6        CBC WITH DIFF         HEMOGLOBIN 9 1  L 11 5 - 14 5 g/dL       HEMATOCRIT 27 6  L 35 0 - 43 0 %       WBC 8 8 4 0 - 10 0 thou/cmm       RBC 3 31  L 3 70 - 4 70 mill/cmm       PLATELET COUNT 080  R        2/14/2023 4:03:33 PM  [facility]  "EKG W/ INTERPRETATION Comparison: sr 02/05/2023 Results: sinus rhythm old anteroseptal infarct pattern  Abnormal ECG  Intervals: HR: 84 bpm VT: 140 ms QRS: 70 ms QT: 340 ms RR: 710 ms QTC: 404 ms Axes: P: 20 Deg QRS: -39 Deg T: 10 Deg Electronically signed by SHABANA Gonzalez  [facility] 2/14/2023 4:03:33 PM EST "    Current Medications   Medications reviewed and updated in facility chart      Carolina Barnes MD  Internal Medicine  Senior Care Physician

## 2023-02-14 ENCOUNTER — TELEPHONE (OUTPATIENT)
Dept: OTHER | Facility: OTHER | Age: 63
End: 2023-02-14

## 2023-02-14 ENCOUNTER — NURSING HOME VISIT (OUTPATIENT)
Dept: GERIATRICS | Facility: OTHER | Age: 63
End: 2023-02-14

## 2023-02-14 DIAGNOSIS — M86.151 OTHER ACUTE OSTEOMYELITIS OF RIGHT FEMUR (HCC): ICD-10-CM

## 2023-02-14 DIAGNOSIS — I63.9 CEREBROVASCULAR ACCIDENT (CVA), UNSPECIFIED MECHANISM (HCC): ICD-10-CM

## 2023-02-14 DIAGNOSIS — R94.31 PROLONGED Q-T INTERVAL ON ECG: Primary | ICD-10-CM

## 2023-02-14 DIAGNOSIS — D64.9 ANEMIA, UNSPECIFIED TYPE: ICD-10-CM

## 2023-02-14 LAB
MYCOBACTERIUM SPEC CULT: NORMAL
RHODAMINE-AURAMINE STN SPEC: NORMAL

## 2023-02-15 PROBLEM — R10.13 EPIGASTRIC PAIN: Status: RESOLVED | Noted: 2022-02-20 | Resolved: 2023-02-15

## 2023-02-15 PROBLEM — S01.81XA FOREHEAD LACERATION: Status: RESOLVED | Noted: 2023-01-23 | Resolved: 2023-02-15

## 2023-02-15 PROBLEM — R41.82 ALTERED MENTAL STATUS: Status: RESOLVED | Noted: 2023-01-24 | Resolved: 2023-02-15

## 2023-02-15 PROBLEM — R19.7 DIARRHEA: Status: RESOLVED | Noted: 2022-11-14 | Resolved: 2023-02-15
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
## 2023-02-16 NOTE — ASSESSMENT & PLAN NOTE
Pt on coumadin  Goal inr 2-3  INR 2 6 on 2/13  Cont coumadin 5 mg 1 tab po daily  Check next INR Mon 2/20 with rest of labs

## 2023-02-16 NOTE — ASSESSMENT & PLAN NOTE
1/27 Rt aka revision  Site healing well; staples c/d/i; area crusting over  Cont doxy till 3/9  Cont augmentin till 3/9  Cont cresemba till 7/27/2023

## 2023-02-21 LAB
MYCOBACTERIUM SPEC CULT: NORMAL
RHODAMINE-AURAMINE STN SPEC: NORMAL

## 2023-02-23 ENCOUNTER — TELEPHONE (OUTPATIENT)
Dept: HEMATOLOGY ONCOLOGY | Facility: CLINIC | Age: 63
End: 2023-02-23

## 2023-02-23 NOTE — TELEPHONE ENCOUNTER
I left a message for patient to call back to reschedule her visit she missed today with Dr Joesph Last

## 2023-02-27 ENCOUNTER — OFFICE VISIT (OUTPATIENT)
Dept: INFECTIOUS DISEASES | Facility: CLINIC | Age: 63
End: 2023-02-27

## 2023-02-27 VITALS
HEIGHT: 64 IN | WEIGHT: 159 LBS | SYSTOLIC BLOOD PRESSURE: 134 MMHG | OXYGEN SATURATION: 96 % | TEMPERATURE: 97.3 F | HEART RATE: 78 BPM | RESPIRATION RATE: 16 BRPM | DIASTOLIC BLOOD PRESSURE: 70 MMHG | BODY MASS INDEX: 27.14 KG/M2

## 2023-02-27 DIAGNOSIS — M86.151 OTHER ACUTE OSTEOMYELITIS OF RIGHT FEMUR (HCC): Primary | ICD-10-CM

## 2023-02-27 DIAGNOSIS — R74.01 TRANSAMINITIS: ICD-10-CM

## 2023-02-27 DIAGNOSIS — R94.31 PROLONGED Q-T INTERVAL ON ECG: ICD-10-CM

## 2023-02-27 DIAGNOSIS — R78.81 BACTEREMIA: ICD-10-CM

## 2023-02-27 NOTE — ASSESSMENT & PLAN NOTE
R AKA stump OM and abscess complicated by polymicrobial bacteremia  Patient initially found to have positive blood cultures with Enterobacter and Enterococcus faecalis  Work up revealed likely source to be R AKA stump based on prior wound cultures  CT imaging of the R stump concerning for osteomyelitis at the femoral stump  She was taken to the OR 1/27 for debridement  OR cultures with Enterobacter, Enterococcus and candida albicans  2D echo done and limited, LEON without valvular vegetation  Patient's antifungal options limited due to prolonged QTC  She completed 2 weeks of IV ampicillin for enterococcal bacteremia and was then switched to po augmentin for bone coverage, 7 days of meropenem for enterobacter bacteremia then switched to po doxycyline for bone coverage and micafungin that was then changed to cresemba for bone coverage  Patient doing well  Labs stable  Incision appears to be healing well       Plan  - continue doxycycline 100 mg po every 12 hours as ordered through 3/9/2023 to complete a total of 6 weeks for OM  - continue augmentin 875 mg po every 12 hours as ordered through 3/9/2023 to complete a total of 6 weeks for OM  - continue cresemba 372 mg daily as ordered through 7/27/2023 to complete a total of 6 months   - continue weekly CBCD, CMP as ordered through 3/9/2023, then CMP every 2 weeks after 3/9/2023 until further notice  - continue local wound care  - follow up with Vascular, will need staple removal  - RTO in 6 weeks

## 2023-02-27 NOTE — PATIENT INSTRUCTIONS
Continue doxycycline 100mg PO Q12 as ordered through 3/9/2023  Continue augmentin 875mg PO Q12 as ordered through 3/9/2023  Continue cresemba 372 mg PO daily through 7/27/23  Continue weekly CBCD, CMP as ordered through 3/9/23  After this, CMP every other week while on cresemba  Continue local care to stump  Return to office in 6 weeks, or as needed sooner

## 2023-02-27 NOTE — ASSESSMENT & PLAN NOTE
Enterobacter and Enterococcus faecalis bacteremia secondary to R AKA stump infection  LEON/TTE without vegetation  Completed 7 days of meropenem and 2 weeks of Ampicillin  No further treatment needed

## 2023-02-27 NOTE — PROGRESS NOTES
Assessment/Plan:    Osteomyelitis ([address] )   R AKA stump OM and abscess complicated by polymicrobial bacteremia  Patient initially found to have positive blood cultures with Enterobacter and Enterococcus faecalis  Work up revealed likely source to be R AKA stump based on prior wound cultures  CT imaging of the R stump concerning for osteomyelitis at the femoral stump  She was taken to the OR 1/27 for debridement  OR cultures with Enterobacter, Enterococcus and candida albicans  2D echo done and limited, LEON without valvular vegetation  Patient's antifungal options limited due to prolonged QTC  She completed 2 weeks of IV ampicillin for enterococcal bacteremia and was then switched to po augmentin for bone coverage, 7 days of meropenem for enterobacter bacteremia then switched to po doxycyline for bone coverage and micafungin that was then changed to cresemba for bone coverage  Patient doing well  Labs stable  Incision appears to be healing well  Plan  - continue doxycycline 100 mg po every 12 hours as ordered through 3/9/2023 to complete a total of 6 weeks for OM  - continue augmentin 875 mg po every 12 hours as ordered through 3/9/2023 to complete a total of 6 weeks for OM  - continue cresemba 372 mg daily as ordered through 7/27/2023 to complete a total of 6 months   - continue weekly CBCD, CMP as ordered through 3/9/2023, then CMP every 2 weeks after 3/9/2023 until further notice  - continue local wound care  - follow up with Vascular, will need staple removal  - RTO in 6 weeks    Transaminitis  AST/ALT now WNL  Elevated alk phos, but stable  Will continue to monitor  Bacteremia  Enterobacter and Enterococcus faecalis bacteremia secondary to R AKA stump infection  LEON/TTE without vegetation  Completed 7 days of meropenem and 2 weeks of Ampicillin  No further treatment needed  Prolonged Q-T interval on ECG  Noted to have prolonged QTC interval therefore fluconazole not an option  Diagnoses and all orders for this visit:    Other acute osteomyelitis of right femur (HCC)    Transaminitis    Bacteremia    Prolonged Q-T interval on ECG          Subjective:      Patient ID: Paulo Keen is a 65 y o  female  HPI  66 y/o female presents for office follow up today regarding R AKA stump OM and abscess complicated by polymicrobial bacteremia  Patient initially found to have positive blood cultures with Enterobacter and Enterococcus faecalis  Work up revealed likely source to be R AKA stump based on prior wound cultures  CT imaging of the R stump concerning for osteomyelitis at the femoral stump  She was taken to the OR 1/27 for debridement  OR cultures with Enterobacter, Enterococcus and candida albicans  2D echo done and limited, LEON without valvular vegetation  Patient's antifungal options limited due to prolonged QTC  She completed 2 weeks of IV ampicillin for enterococcal bacteremia and was then switched to po augmentin for bone coverage, 7 days of meropenem for enterobacter bacteremia then switched to po doxycyline for bone coverage and micafungin that was then changed to cresemba for bone coverage  Patient now presents for follow up doing well  She has no new complaints  She states she is tolerating the antibiotics without issues  She is doing PT and ultimately wished to go back home  The following portions of the patient's history were reviewed and updated as appropriate: allergies, current medications, past family history, past medical history, past social history, past surgical history and problem list     Review of Systems   Constitutional: Negative for chills and fever  Respiratory: Negative for cough and shortness of breath  Gastrointestinal: Negative for abdominal pain, diarrhea, nausea and vomiting  Skin: Negative for rash  Psychiatric/Behavioral: Negative for behavioral problems and confusion           Objective:      /70   Pulse 78   Temp (!) 97 3 °F (36 3 °C)   Resp 16   Ht 5' 4" (1 626 m)   Wt 72 1 kg (159 lb)   SpO2 96%   BMI 27 29 kg/m²          Physical Exam  Vitals reviewed  Constitutional:       General: She is not in acute distress  Appearance: Normal appearance  She is not ill-appearing, toxic-appearing or diaphoretic  HENT:      Head: Normocephalic and atraumatic  Eyes:      General: No scleral icterus  Right eye: No discharge  Left eye: No discharge  Conjunctiva/sclera: Conjunctivae normal    Cardiovascular:      Rate and Rhythm: Normal rate  Pulmonary:      Effort: Pulmonary effort is normal  No respiratory distress  Breath sounds: Normal breath sounds  No stridor  No wheezing, rhonchi or rales  Chest:      Chest wall: No tenderness  Abdominal:      General: Bowel sounds are normal  There is no distension  Palpations: Abdomen is soft  Tenderness: There is no abdominal tenderness  Musculoskeletal:      Comments: R AKA stump appears to be healing well  Staple remain in place  Skin:     General: Skin is warm and dry  Coloration: Skin is not jaundiced or pale  Findings: No erythema or rash  Neurological:      Mental Status: She is alert and oriented to person, place, and time     Psychiatric:         Mood and Affect: Mood normal          Behavior: Behavior normal              Labs:   2/20/2023  Wbc: 7 7  Hgb: 10 4  Plt: 465  Cr: 0 76  Alk phos: 310  Ast: 37  Alt: 46

## 2023-02-28 LAB
MYCOBACTERIUM SPEC CULT: NORMAL
RHODAMINE-AURAMINE STN SPEC: NORMAL

## 2023-03-03 ENCOUNTER — NURSING HOME VISIT (OUTPATIENT)
Dept: GERIATRICS | Facility: OTHER | Age: 63
End: 2023-03-03

## 2023-03-03 DIAGNOSIS — F41.9 ANXIETY: ICD-10-CM

## 2023-03-03 DIAGNOSIS — Z86.73 HISTORY OF CVA (CEREBROVASCULAR ACCIDENT): ICD-10-CM

## 2023-03-03 DIAGNOSIS — R41.82 ALTERED MENTAL STATUS, UNSPECIFIED ALTERED MENTAL STATUS TYPE: Primary | ICD-10-CM

## 2023-03-03 NOTE — PROGRESS NOTES
[facility]  [address]  [phone]) [facility] [address] [facility]  [address]      NAME: Lance Rodriguez  AGE: 65 y o  SEX: female [id number]    DATE OF ENCOUNTER: 3/3/2023    Assessment and Plan     1  Altered mental status, unspecified altered mental status type        2  Anxiety        3  History of CVA (cerebrovascular accident)           Altered mental status  Resolved  Initially pt was very anxious & not fully oriented  Pt now A&Ox3    Anxiety  Pt initially was confused/anxious  Now resolved  Pt tolerating seroquel 25 mg 1 tab po QHS  Cont wellbutrin xl 150 mg 1 tab po daily  Cont sertraline 100 mg 1 tab po daily  QTc on several ekgs checks wnl    History of CVA (cerebrovascular accident)  Pt with hx of cva  Cont metoprolol for bp control  Cont coumadin; goal INR 2-3  Check weekly INR while on ax       Code status: full    Chief Complaint     Routine Long term follow-up visit  30 day orders signed    History of Present Illness     Asked by RN to see pt as 30 day hospital f/u visit  Pt seen and examined  Pt feels well  The following portions of the patient's history were reviewed and updated as appropriate: allergies, current medications, past family history, past medical history, past social history, past surgical history and problem list     Review of Systems     Review of Systems   All other systems reviewed and are negative          Active Problem List     Patient Active Problem List   Diagnosis   • Esophageal reflux   • DM (diabetes mellitus) type II, controlled, with peripheral vascular disorder (HCC)   • Class 3 severe obesity due to excess calories with serious comorbidity and body mass index (BMI) of 40 0 to 44 9 in adult [facility])   • Depression, recurrent (HCC)   • Anxiety   • Hypertension   • Stroke [facility])   • Diabetic peripheral neuropathy ([facility] )   • Vitamin D deficiency   • Systolic murmur   • Familial combined hyperlipidemia   • Arthritis   • Transaminitis   • Acute colitis   • Moderate protein-calorie malnutrition ([facility])   • 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   • 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 ([facility])   • Type 2 diabetes mellitus with hyperglycemia, with long-term current use of insulin ([facility])   • Hyperparathyroidism ([facility] [address] )   • Wound of lower extremity   • Non healing left heel wound   • Hypertensive urgency   • Hypokalemia   • UTI (urinary tract infection) due to Enterococcus   • PAD (peripheral artery disease) ([facility])   • Generalized edema due to fluid overload   • Anemia   • Diabetic ulcer of heel associated with diabetes mellitus due to underlying condition ([facility])   • HIT (heparin-induced thrombocytopenia)   • CVA (cerebral vascular accident) ([facility] [address] )   • Mild protein-calorie malnutrition ([facility] [address] )   • History of CVA (cerebrovascular accident)   • Fall   • Cardiac arrest ([facility] [address] )   • Troponin level elevated   • Hypomagnesemia   • Bacteremia   • Osteomyelitis ([facility])   • Prolonged Q-T interval on ECG   • Thrombocytosis   • 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

## 2023-03-07 LAB
MYCOBACTERIUM SPEC CULT: NORMAL
RHODAMINE-AURAMINE STN SPEC: NORMAL

## 2023-03-09 ENCOUNTER — OFFICE VISIT (OUTPATIENT)
Dept: WOUND CARE | Facility: CLINIC | Age: 63
End: 2023-03-09

## 2023-03-09 ENCOUNTER — OFFICE VISIT (OUTPATIENT)
Dept: VASCULAR SURGERY | Facility: CLINIC | Age: 63
End: 2023-03-09

## 2023-03-09 VITALS
RESPIRATION RATE: 16 BRPM | SYSTOLIC BLOOD PRESSURE: 126 MMHG | HEART RATE: 78 BPM | TEMPERATURE: 98.6 F | DIASTOLIC BLOOD PRESSURE: 64 MMHG

## 2023-03-09 VITALS — SYSTOLIC BLOOD PRESSURE: 118 MMHG | DIASTOLIC BLOOD PRESSURE: 82 MMHG

## 2023-03-09 DIAGNOSIS — S78.112D COMPLETE ABOVE KNEE AMPUTATION OF LOWER EXTREMITY, LEFT, SUBSEQUENT ENCOUNTER (HCC): ICD-10-CM

## 2023-03-09 DIAGNOSIS — I73.9 PAD (PERIPHERAL ARTERY DISEASE) (HCC): Primary | Chronic | ICD-10-CM

## 2023-03-09 DIAGNOSIS — L89.892 PRESSURE INJURY OF OTHER SITE, STAGE 2 (HCC): ICD-10-CM

## 2023-03-09 DIAGNOSIS — E11.65 TYPE 2 DIABETES MELLITUS WITH HYPERGLYCEMIA, WITH LONG-TERM CURRENT USE OF INSULIN (HCC): ICD-10-CM

## 2023-03-09 DIAGNOSIS — T87.9 AKA STUMP COMPLICATION (HCC): Primary | ICD-10-CM

## 2023-03-09 DIAGNOSIS — S78.111D: ICD-10-CM

## 2023-03-09 DIAGNOSIS — Z79.4 TYPE 2 DIABETES MELLITUS WITH HYPERGLYCEMIA, WITH LONG-TERM CURRENT USE OF INSULIN (HCC): ICD-10-CM

## 2023-03-09 PROBLEM — S78.119A COMPLETE ABOVE KNEE AMPUTATION OF LOWER EXTREMITY (HCC): Status: ACTIVE | Noted: 2023-03-09

## 2023-03-09 NOTE — PROGRESS NOTES
Assessment/Plan:    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 [facility])  61-year-old female with bilateral lower extremity above-knee amputations due to significant PAD and complications after attempted revascularization  Patient is doing well and incisions look clean dry and intact  Her left incision has fully healed and her right incision is almost fully healed with a small area lateral to the midline that has some skin separation  All the staples were removed and Steri-Strips were placed over the area of skin separation  I am overall happy with her progress and happy to see her doing well  We will have her seen back here in about 6 months  PAD (peripheral artery disease) ([facility] [address] )  Patient has palpable femoral pulses  At this point would not pursue any further imaging for her PAD  Patient is on Plavix which we can stop and transition her to aspirin 81 mg  She should continue her normal anticoagulation as prescribed  Diagnoses and all orders for this visit:    PAD (peripheral artery disease) ([facility] [address] )    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, left, subsequent encounter ([facility] )    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, right, subsequent encounter ([facility] )        Subjective:      Patient ID: Agata Gayle is a 65 y o  female  Patient present for a post op follow up  Patient c/o denies any pain or complains  Patient states she is hoping the staples can come off today  L BKA looks clean and healing fine  Patient had a Renal artery complete done on 2/3/23  Patient is currently taking Atorvastatin, Plavix and Warfarin  HPI   This is a 61-year-old female well-known to our vascular surgery service who was recently into a prolonged hospital stay since October 2022 due to bilateral dry gangrene of her feet    Patient underwent an angiogram of the left lower extremity on 13/28/3450 complicated by closure device issue requiring surgery on 11/7/2022 where she underwent an angiogram the right lower extremity with attempts at atherectomy and angioplasty with stenting of the right lower extremity  Unfortunately due to shower embolization and inability to fully revascularize the leg the patient ended up requiring bilateral above-knee amputations with the left one being done 11/23 and the right one being on 12/1  These unfortunately were complicated by wound infections that required takeback for washout and revision  Today the patient is doing well and feels well  She states she had no issues with her bilateral lower extremities  She is working with physical therapy on increasing her strength  The following portions of the patient's history were reviewed and updated as appropriate: allergies, current medications, past family history, past medical history, past social history, past surgical history and problem list     I have spent a total time of 30 minutes on 03/09/23 in caring for this patient including Prognosis, Risks and benefits of tx options, Instructions for management, Patient and family education, Importance of tx compliance, Risk factor reductions, Impressions, Counseling / Coordination of care, Documenting in the medical record, Reviewing / ordering tests, medicine, procedures  , Obtaining or reviewing history   and Communicating with other healthcare professionals   Review of Systems   Musculoskeletal: Positive for gait problem  Back pain: stretcher  Objective:      /82 (BP Location: Right arm, Patient Position: Sitting, Cuff Size: Adult)          Physical Exam  Constitutional:       Appearance: Normal appearance  HENT:      Head: Normocephalic  Mouth/Throat:      Mouth: Mucous membranes are moist       Pharynx: Oropharynx is clear  Eyes:      Extraocular Movements: Extraocular movements intact  Pupils: Pupils are equal, round, and reactive to light  Cardiovascular:      Rate and Rhythm: Normal rate and regular rhythm  Pulmonary:      Effort: Pulmonary effort is normal    Abdominal:      General: Abdomen is flat  Tenderness: There is no abdominal tenderness  Musculoskeletal:      Cervical back: Normal range of motion  Skin:     Comments: Incisions c/d/i, left stump incision well healed, right stump incision with staples, well healed with small area on the right side with some skin separation  No erythema, no drainage, non tender   Neurological:      General: No focal deficit present  Mental Status: She is alert and oriented to person, place, and time     Psychiatric:         Mood and Affect: Mood normal          Behavior: Behavior normal

## 2023-03-09 NOTE — ASSESSMENT & PLAN NOTE
Patient has palpable femoral pulses  At this point would not pursue any further imaging for her PAD  Patient is on Plavix which we can stop and transition her to aspirin 81 mg  She should continue her normal anticoagulation as prescribed

## 2023-03-09 NOTE — PATIENT INSTRUCTIONS
Orders Placed This Encounter   Procedures    Wound cleansing and dressings     Wash your hands with soap and water  Remove old dressing, discard into plastic bag and place in trash  Cleanse the wound with NSS prior to applying a clean dressing  Do not use tissue or cotton balls  Do not scrub the wound  Pat dry using gauze  Right AKA Blister  Keep Allevyn Bordered Foam dressing on for one week  After one week remove dressing  If wound healed may leave open to air  Please consume 3-4 servings of protein each day  This will help your wounds to heal     [address] if wound is still draining after one week  Pt discharged from [facility] today  Follow up as needed for wound care needs  Today's Treatment in the [facility]  Wound cleansed with NSS  Allevyn bordered foam dressing applied       Standing Status:   Future     Standing Expiration Date:   3/9/2024

## 2023-03-09 NOTE — ASSESSMENT & PLAN NOTE
29-year-old female with bilateral lower extremity above-knee amputations due to significant PAD and complications after attempted revascularization  Patient is doing well and incisions look clean dry and intact  Her left incision has fully healed and her right incision is almost fully healed with a small area lateral to the midline that has some skin separation  All the staples were removed and Steri-Strips were placed over the area of skin separation  I am overall happy with her progress and happy to see her doing well  We will have her seen back here in about 6 months

## 2023-03-09 NOTE — PROGRESS NOTES
Patient ID: Betzy Mari is a 65 y o  female Date of Birth 1960       Chief Complaint   Patient presents with   • New Patient Visit     I'm here because of the wounds on my legs (pt is sp BL AKA)       Allergies:  Heparin    Diagnosis:      Diagnosis ICD-10-CM Associated Orders   1  AKA stump complication ([facility])  S30 8 Wound cleansing and dressings      2  Pressure injury of other site, stage 2 ([facility] )  L89 892 Incision and Drainage      3  Type 2 diabetes mellitus with hyperglycemia, with long-term current use of insulin ([facility])  E11 65     Z79 4               Assessment & Plan:  • Blister formation/stage II pressure injury on the right lateral AKA stump   o Incision and drainage of blister  o Small bordered foam which may be left intact up to a week   o No follow-up in the wound center needed at this time  • No open wounds noted on right AKA stump  Jerel still in place  o Patient has appointment with vascular surgery today for staple removal   • Poorly controlled type 2 diabetes  A1C results reviewed with the patient today  Subjective:   3/9/2023: First visit for this 61-year-old female who was referred to the wound center for open wounds of the right AKA stump  The patient was under the impression that she was here for staple removal   Her last surgery on the right AKA stump was in January  She does have an appointment later today with vascular surgery to have staples removed  She had open wounds on the base of her AKA stump on the right which she was told yesterday that were closed  Nurses today noted a blister on the lateral aspect of the right AKA suture site  The patient was unaware  Patient underwent bilateral AKA surgery in November and had complications of osteomyelitis  Revision in January of this year  Type 2 diabetes with hyperglycemia  Last A1c in 2022 was 9 8  Patient offers no other complaints  She does not have a very good recollection of her medical history        The following portions of the patient's history were reviewed and updated as appropriate:   Patient Active Problem List   Diagnosis   • Esophageal reflux   • DM (diabetes mellitus) type II, controlled, with peripheral vascular disorder ([facility])   • Class 3 severe obesity due to excess calories with serious comorbidity and body mass index (BMI) of 40 0 to 44 9 in adult [facility])   • Depression, recurrent ([facility])   • Anxiety   • Hypertension   • Stroke [facility])   • Diabetic peripheral neuropathy ([place] [address] )   • Vitamin D deficiency   • Systolic murmur   • Familial combined hyperlipidemia   • Arthritis   • Transaminitis   • Acute colitis   • Moderate protein-calorie malnutrition ([facility])   • 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   • 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 ([facility])   • Type 2 diabetes mellitus with hyperglycemia, with long-term current use of insulin ([facility])   • Hyperparathyroidism ([facility] )   • Wound of lower extremity   • Non healing left heel wound   • Hypertensive urgency   • Hypokalemia   • UTI (urinary tract infection) due to Enterococcus   • PAD (peripheral artery disease) ([facility])   • Generalized edema due to fluid overload   • Anemia   • Diabetic ulcer of heel associated with diabetes mellitus due to underlying condition ([facility] )   • HIT (heparin-induced thrombocytopenia)   • CVA (cerebral vascular accident) ([facility] )   • Mild protein-calorie malnutrition ([facility] )   • History of CVA (cerebrovascular accident)   • Sepsis due to urinary tract infection ([facility] )   • Cardiac arrest ([facility])   • Troponin level elevated   • Hypomagnesemia   • Bacteremia   • Osteomyelitis ([facility])   • Prolonged Q-T interval on ECG   • Thrombocytosis     Past Medical History:   Diagnosis Date   • Altered mental status 1/24/2023   • Anxiety    • Depression    • Diabetes ([facility] )    • Diabetes mellitus ([place] [address] )    • Diarrhea 11/14/2022   • Epigastric pain 2/20/2022   • Esophageal reflux     28 APR 2015 RESOLVED   • Fall 1/17/2023   • Forehead laceration 1/23/2023   • Hyperlipidemia    • Hypertension    • Low back pain     sciatica   • Pneumonia 2019   • Stroke ([address] ) 12/2015    small vessel, L frontal corona radiata  Rx asa 81, Lipitor 40, risk modification   • Vitamin D deficiency      Past Surgical History:   Procedure Laterality Date   • AMPUTATION ABOVE KNEE (AKA) Right 12/1/2022    Procedure: (AKA), PSB  BKA;  Surgeon: Sigrid Schilder, DO;  [facility];  Service: Vascular   • ARTERIOGRAM Right 11/7/2022    Procedure: -Right lower extremity angiogram -Right CFA balloon angioplasty with [id number]  [employer]  -Right CFA DCB with 6x40 Bard Lutonix -Right CFA atherectomy with Jetstream and distal embolization protection with 7mm Spider FX -Right SFA/Pop angioplasty with 4x40 Chololate balloon -Right SFA/Pop DCB with 5x150 Bard Lutonix -Right SFA stent with 6x80 W W  [employer]chant View x2 -R   • COLONOSCOPY     • IR AORTAGRAM WITH RUN-OFF  10/31/2022   • IR LOWER EXTREMITY ANGIOGRAM  11/10/2022   • IR LOWER EXTREMITY ANGIOGRAM  11/7/2022   • IN AMP LEG THRU TIBIA&FIBULA SEC CLOSURE/SCAR REV Right 1/27/2023    Procedure: AMPUTATION REVISION STUMP;  Surgeon: Sigrid Schilder, DO;  [facility];  Service: Vascular   • IN AMPUTATION THIGH THROUGH FEMUR ANY LEVEL Left 11/23/2022    Procedure: (AKA); Surgeon: Blanca Pierson DO;  [facility];  Service: Vascular   • IN NEGATIVE PRESSURE WOUND THERAPY DME </= 50 SQ CM Bilateral 12/19/2022    Procedure: Right above knee amputation washout; vac change; Left above knee amputation debridement; vac placement;  Surgeon: Sigrid Schilder, DO;  [facility];  Service: Vascular   • WOUND DEBRIDEMENT Right 12/16/2022    Procedure: AKA STUMP WASHOUT, Left AKA Staple removal  application of wound vac to Right AKA;   Surgeon: Elie Vicente MD;  [facility];  Service: Vascular     Family History   Problem Relation Age of Onset   • Diabetes Mother    • Lung cancer Mother    • Cancer Father    • Lung cancer Father    • Hypertension Brother    • Hypertension Family       Social History     Socioeconomic History   • Marital status: Single     Spouse name: Not on file   • Number of children: Not on file   • Years of education: Not on file   • Highest education level: Not on file   Occupational History   • Not on file   Tobacco Use   • Smoking status: Former     Types: Cigarettes     Quit date:      Years since quittin 1   • Smokeless tobacco: Never   Vaping Use   • Vaping Use: Never used   Substance and Sexual Activity   • Alcohol use: Never     Comment: OCCASIONAL ALCOHOL USE IN ALL SCRIPTS   • Drug use: No   • Sexual activity: Not on file   Other Topics Concern   • Not on file   Social History Narrative   • Not on file     Social Determinants of Health     Financial Resource Strain: Not on file   Food Insecurity: No Food Insecurity   • Worried About Running Out of Food in the Last Year: Never true   • Ran Out of Food in the Last Year: Never true   Transportation Needs: Not on file   Physical Activity: Not on file   Stress: Not on file   Social Connections: Not on file   Intimate Partner Violence: Not on file   Housing Stability: Low Risk    • Unable to Pay for Housing in the Last Year: No   • Number of Places Lived in the Last Year: 1   • Unstable Housing in the Last Year: No        Current Outpatient Medications:   •  ALPRAZolam (XANAX) 0 25 mg tablet, Take 1 tablet (0 25 mg total) by mouth daily as needed for anxiety (panic attacks), Disp: 10 tablet, Rfl: 0  •  amiodarone 200 mg tablet, Take 1 tablet (200 mg total) by mouth daily, Disp: , Rfl:   •  amLODIPine (NORVASC) 10 mg tablet, TAKE 1 TABLET BY MOUTH EVERY DAY, Disp: 90 tablet, Rfl: 1  •  amoxicillin-clavulanate (AUGMENTIN) 875-125 mg per tablet, Take 1 tablet by mouth every 12 (twelve) hours for 69 doses, Disp: 60 tablet, Rfl: 0  •  atorvastatin (LIPITOR) 40 mg tablet, TAKE 1 TABLET BY MOUTH EVERY DAY, Disp: 90 tablet, Rfl: 1  •  buPROPion (WELLBUTRIN XL) 150 mg 24 hr tablet, Take 1 tablet (150 mg total) by mouth daily, Disp: 30 tablet, Rfl: 0  •  clopidogrel (PLAVIX) 75 mg tablet, Take 1 tablet (75 mg total) by mouth daily, Disp: 30 tablet, Rfl: 0  •  doxycycline hyclate (VIBRAMYCIN) 100 mg capsule, Take 1 capsule (100 mg total) by mouth every 12 (twelve) hours for 69 doses, Disp: 60 capsule, Rfl: 0  •  gabapentin (NEURONTIN) 100 mg capsule, TAKE 2 CAPSULES BY MOUTH TWICE A DAY, Disp: 360 capsule, Rfl: 0  •  hydrALAZINE (APRESOLINE) 100 MG tablet, Take 1 tablet (100 mg total) by mouth every 8 (eight) hours, Disp: , Rfl: 0  •  hydrochlorothiazide (HYDRODIURIL) 12 5 mg tablet, Take 1 tablet (12 5 mg total) by mouth daily Do not start before February 4, 2023 , Disp: , Rfl: 0  •  insulin glargine (LANTUS) 100 units/mL subcutaneous injection, Inject 20 Units under the skin every morning Do not start before February 4, 2023 , Disp: 10 mL, Rfl: 0  •  clotrimazole-betamethasone (LOTRISONE) 1-0 05 % cream, Apply topically 2 (two) times a day, Disp: 30 g, Rfl: 0  •  Continuous Blood Gluc  (FreeStyle Noé 14 Day Archer) LITO, Use 1 Units continuous, Disp: 1 each, Rfl: 0  •  Continuous Blood Gluc Sensor (FreeStyle Noé 14 Day Sensor) MISC, Use daily as directed for CGM - Change every 14 days, Disp: 2 each, Rfl: 1  •  fluocinonide (LIDEX) 0 05 % ointment, Apply topically 2 (two) times a day, Disp: 30 g, Rfl: 0  •  Insulin Pen Needle (B-D UF III MINI PEN NEEDLES) 31G X 5 MM MISC, USE WITH INSULIN FOUR TIMES DAILY, Disp: 200 each, Rfl: 3  •  Isavuconazonium Sulfate 186 MG CAPS, Take 2 capsules (372 mg total) by mouth 3 (three) times a day for 2 days, THEN 2 capsules (372 mg total) in the morning , Disp: , Rfl: 0  •  Lancets 28G MISC, Use 1 each 4 (four) times a day, Disp: 400 each, Rfl: 0  •  lisinopril (ZESTRIL) 40 mg tablet, TAKE 1 TABLET BY MOUTH EVERY DAY, Disp: 90 tablet, Rfl: 1  • magnesium chloride (MAG64) 64 MG TBEC EC tablet, Take 1 tablet (64 mg total) by mouth daily Do not start before February 4, 2023 , Disp: , Rfl: 0  •  methocarbamol 1000 MG TABS, Take 1,000 mg by mouth every 8 (eight) hours, Disp: 30 tablet, Rfl: 0  •  metoprolol tartrate (LOPRESSOR) 50 mg tablet, Take 1 tablet (50 mg total) by mouth every 12 (twelve) hours, Disp: , Rfl: 0  •  Multiple Vitamin (multivitamin) tablet, Take 1 tablet by mouth daily, Disp: , Rfl:   •  pantoprazole (PROTONIX) 40 mg tablet, Take 1 tablet (40 mg total) by mouth daily for 14 days, Disp: 14 tablet, Rfl: 0  •  potassium chloride (K-DUR,KLOR-CON) 10 mEq tablet, Take 2 tablets (20 mEq total) by mouth 2 (two) times a day for 2 doses, Disp: 4 tablet, Rfl: 0  •  QUEtiapine (SEROquel) 25 mg tablet, Take 1 tablet (25 mg total) by mouth daily at bedtime, Disp: 30 tablet, Rfl: 0  •  sertraline (ZOLOFT) 100 mg tablet, TAKE 1 TABLET BY MOUTH EVERY DAY, Disp: 90 tablet, Rfl: 1  •  thiamine (VITAMIN B1) 100 mg tablet, Take 1 tablet (100 mg total) by mouth daily Do not start before February 4, 2023 , Disp: , Rfl: 0  •  Trulicity 9 80 QR/1 5UL SOPN, INJECT 0 5 ML (0 75 MG TOTAL) UNDER THE SKIN ONCE A WEEK TUESDAY, Disp: 2 mL, Rfl: 1  •  warfarin (COUMADIN) 4 mg tablet, Take 1 tablet (4 mg total) by mouth daily, Disp: , Rfl:     Review of Systems   Constitutional: Negative for appetite change, chills, fatigue, fever and unexpected weight change  HENT: Negative for congestion, hearing loss and postnasal drip  Respiratory: Negative for cough  Musculoskeletal: Positive for gait problem (Bilateral AKA)  Skin: Positive for wound (Right AKA)  Negative for rash  Neurological: Negative for numbness  Hematological: Does not bruise/bleed easily  Objective:  /64   Pulse 78   Temp 98 6 °F (37 °C) (Tympanic)   Resp 16         Physical Exam  Vitals and nursing note reviewed  Constitutional:       Appearance: Normal appearance   She is well-developed and normal weight  HENT:      Head: Normocephalic and atraumatic  Cardiovascular:      Rate and Rhythm: Normal rate  Pulmonary:      Effort: Pulmonary effort is normal    Skin:     General: Skin is warm and dry  Findings: Wound present  Comments: Tense blister the right lateral AKA stump suture site  Neurological:      Mental Status: She is alert and oriented to person, place, and time  Psychiatric:         Mood and Affect: Affect is flat  Incision and Drainage    Date/Time: 3/9/2023 10:40 AM  Performed by: Alayna Lucas MD  Authorized by: Alayna Lucas MD   Universal Protocol:  Consent: Verbal consent obtained  Written consent obtained  Consent given by: patient  Time out: Immediately prior to procedure a "time out" was called to verify the correct patient, procedure, equipment, support staff and site/side marked as required  Patient understanding: patient states understanding of the procedure being performed  Patient identity confirmed: verbally with patient      Patient location:  Clinic  Location:     Type:  Fluid collection  Pre-procedure details:     Skin preparation:  Betadine  Procedure details:     Complexity:  Simple    Needle aspiration: no      Incision types:  Stab incision    Scalpel blade:  11    Approach:  Open    Incision depth:  Superficial    Drainage:  Serous    Drainage amount: Moderate    Wound treatment:  Wound left open    Packing materials:  None  Post-procedure details:     Patient tolerance of procedure:   Tolerated well, no immediate complications  Comments:      Covered with small bordered foam          Results from last 6 Months   Lab Units 01/24/23  1703   WOUND CULTURE  Growth in Broth culture only Candida albicans*  Few Colonies of       Lab Results   Component Value Date    HGBA1C 9 8 (H) 10/25/2022       Wound Instructions:  Orders Placed This Encounter   Procedures   • Wound cleansing and dressings Wash your hands with soap and water  Remove old dressing, discard into plastic bag and place in trash  Cleanse the wound with NSS prior to applying a clean dressing  Do not use tissue or cotton balls  Do not scrub the wound  Pat dry using gauze  Right AKA Blister  Keep Allevyn Bordered Foam dressing on for one week  After one week remove dressing  If wound healed may leave open to air  Please consume 3-4 servings of protein each day  This will help your wounds to heal     [address] if wound is still draining after one week  Pt discharged from [facility] today  Follow up as needed for wound care needs  Today's Treatment in the [facility]  Wound cleansed with NSS  Allevyn bordered foam dressing applied  Standing Status:   Future     Standing Expiration Date:   3/9/2024   • Incision and Drainage     This order was created via procedure documentation           Total time spent today:    Total time (face-to-face and non-face-to-face) spent on today's visit was  20 minutes  This includes preparation for the visits (i e  reviewing test results from date recent hospitalizations, ER/Urgent Care/primary care visits and recent consultant office visits), performance of a medically appropriate history and examination, and orders for medications or testing  Matthieu Ivey MD, CHT, CWS    Portions of the record may have been created with voice recognition software  Occasional wrong word or "sound alike" substitutions may have occurred due to the inherent limitations of voice recognition software  Read the chart carefully and recognize, using context, where substitutions have occurred

## 2023-03-13 ENCOUNTER — TELEPHONE (OUTPATIENT)
Dept: OTHER | Facility: OTHER | Age: 63
End: 2023-03-13

## 2023-03-13 LAB
MYCOBACTERIUM SPEC CULT: NORMAL
RHODAMINE-AURAMINE STN SPEC: NORMAL

## 2023-03-14 NOTE — TELEPHONE ENCOUNTER
Eliane Chen from [facility] called regarding pt/inr results and orders  On call notified via TC

## 2023-03-18 ENCOUNTER — TELEPHONE (OUTPATIENT)
Dept: OTHER | Facility: OTHER | Age: 63
End: 2023-03-18

## 2023-03-18 NOTE — TELEPHONE ENCOUNTER
Facility is requesting a call back from the office to discuss lab work review    Paged on call provider

## 2023-03-19 ENCOUNTER — TELEPHONE (OUTPATIENT)
Dept: OTHER | Facility: OTHER | Age: 63
End: 2023-03-19

## 2023-03-20 ENCOUNTER — NURSING HOME VISIT (OUTPATIENT)
Dept: GERIATRICS | Facility: OTHER | Age: 63
End: 2023-03-20
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
## 2023-03-20 DIAGNOSIS — R74.01 TRANSAMINITIS: ICD-10-CM

## 2023-03-20 DIAGNOSIS — I10 PRIMARY HYPERTENSION: ICD-10-CM

## 2023-03-20 DIAGNOSIS — E11.65 TYPE 2 DIABETES MELLITUS WITH HYPERGLYCEMIA, WITH LONG-TERM CURRENT USE OF INSULIN (HCC): ICD-10-CM

## 2023-03-20 DIAGNOSIS — Z79.4 TYPE 2 DIABETES MELLITUS WITH HYPERGLYCEMIA, WITH LONG-TERM CURRENT USE OF INSULIN (HCC): ICD-10-CM

## 2023-03-20 DIAGNOSIS — I73.9 PAD (PERIPHERAL ARTERY DISEASE) (HCC): Chronic | ICD-10-CM

## 2023-03-20 NOTE — ASSESSMENT & PLAN NOTE
Stable   S/p left AKA 11/23/22  S/p right AKA stump reivison 1/27/23  Plavix dc'd in March per vascular  Continue Aspirin, Statin, Oxycodone, Warfarin, Gabapentin, Methocarbamol  Next INR pending 3/23  Continue to assist with ADLs, ongoing supportive care, remains at risk for falls, continue fall precautions

## 2023-03-20 NOTE — ASSESSMENT & PLAN NOTE
Stable   SBP range on avg 130-140/60-70s  Continue Metoprolol, Amlodipine, Lisinopril, Hydralazine, HCTZ  Continue to monitor readings s/p fall, avoid hypotension as can also increase fall risk

## 2023-03-20 NOTE — ASSESSMENT & PLAN NOTE
Lab Results   Component Value Date    HGBA1C 9 8 (H) 10/25/2022     Stable   Blood sugars trends reviewed, stable range on avg 110s this month  Continue Trulicity and Lantus  Continue to monitor, avoid hypoglycemia Deferred to Pediatrician's Office

## 2023-03-20 NOTE — ASSESSMENT & PLAN NOTE
S/p fall on 3/19 when slid out of wheelchair onto floor during stool incontinence episode  No injury  Mental status at baseline   Continue to monitor neuro checks and vital signs  Assist with ADLS  Continue fall precautions

## 2023-03-20 NOTE — ASSESSMENT & PLAN NOTE
Hx of  Recent labs showing slightly elevated readings  Thought to be related to IBD in the past  Repeat CMP on 3/23

## 2023-03-20 NOTE — PROGRESS NOTES
Acute Visit  Location: [facility]   POS: 28 ([facility])    NAME: Irvin Vizcarra  AGE: 65 y o  SEX: female    ASSESSMENT AND PROBLEMS:  1  Fall, initial encounter  Assessment & Plan:  S/p fall on 3/19 when slid out of wheelchair onto floor during stool incontinence episode  No injury  Mental status at baseline   Continue to monitor neuro checks and vital signs  Assist with ADLS  Continue fall precautions      2  Primary hypertension  Assessment & Plan:  Stable   SBP range on avg 130-140/60-70s  Continue Metoprolol, Amlodipine, Lisinopril, Hydralazine, HCTZ  Continue to monitor readings s/p fall, avoid hypotension as can also increase fall risk       3  Type 2 diabetes mellitus with hyperglycemia, with long-term current use of insulin (HCC)  Assessment & Plan:    Lab Results   Component Value Date    HGBA1C 9 8 (H) 10/25/2022     Stable   Blood sugars trends reviewed, stable range on avg 110s this month  Continue Trulicity and Lantus  Continue to monitor, avoid hypoglycemia       4  PAD (peripheral artery disease) ([facility] [address] )  Assessment & Plan:  Stable   S/p left AKA 11/23/22  S/p right AKA stump reivison 1/27/23  Plavix dc'd in March per vascular  Continue Aspirin, Statin, Oxycodone, Warfarin, Gabapentin, Methocarbamol  Next INR pending 3/23  Continue to assist with ADLs, ongoing supportive care, remains at risk for falls, continue fall precautions       5  Transaminitis  Assessment & Plan:  Hx of  Recent labs showing slightly elevated readings  Thought to be related to IBD in the past  Repeat CMP on 3/23        CHIEF COMPLAINT:    "fall"    HISTORY OF PRESENT ILLNESS:    Irvin Vizcarra is a [facility] resident of [facility] seen today per staff request for f/u s/p fall yesterday in the afternoon on 3/19  Patient reports she was incontinent of stool and tried stopping herself however she slipped when standing and slid out of her wheelchair onto her buttocks     She denies any sacral pain or pain in general  She denies LOC or hitting head  Vital signs remain stable  She does have hx of B/L AKAs  REVIEW OF SYSTEMS:  Per history of present illness, all other systems reviewed and negative  HISTORY:  Past Medical History:   Diagnosis Date   • Altered mental status 2023   • Anxiety    • Depression    • Diabetes ([facility] )    • Diabetes mellitus ([facility] )    • Diarrhea 2022   • Epigastric pain 2022   • Esophageal reflux     2015 RESOLVED   • Fall 2023   • Forehead laceration 2023   • Hyperlipidemia    • Hypertension    • Low back pain     sciatica   • Pneumonia    • Stroke ([place] [address] ) 2015    small vessel, L frontal corona radiata  Rx asa 81, Lipitor 36, risk modification   • Vitamin D deficiency      Family History   Problem Relation Age of Onset   • Diabetes Mother    • Lung cancer Mother    • Cancer Father    • Lung cancer Father    • Hypertension Brother    • Hypertension Family      Social History     Tobacco Use   • Smoking status: Former     Types: Cigarettes     Quit date:      Years since quittin 2   • Smokeless tobacco: Never   Vaping Use   • Vaping Use: Never used   Substance Use Topics   • Alcohol use: Never     Comment: OCCASIONAL ALCOHOL USE IN ALL SCRIPTS   • Drug use: No     Allergies   Allergen Reactions   • Heparin Other (See Comments)     History of HIT       PHYSICAL EXAM:  Vital Signs: /58, temp 98 2, HR 77, RR 19, O2 98% on room air  Weight 153 pounds      General: NAD, laying in bed   Eye Exam: anicteric sclera, EOMI, PERRL  Oral Exam: moist mucous membranes  Cardiovascular: regular rate and rhythm, no murmurs, rubs, or gallops  Pulmonary: clear to auscultation bilaterally   RA  Abdominal: soft, nontender, nondistended, bowel sounds audible  Extremities and skin: no overt bruising  Neurological: alert and responsive  Psychiatric: euthymic, cooperative     PERTINENT LABS/IMAGING DATA:    3/18 CMP: BUN 27, Creatinine 0 95, , K 4 3 aslk phos 272, albumin 2 3, total protein 5 5, ,      3/17 PT/INR: 2 7, 29 0    3/15 CMP: BUN 31, Creat 1 05, , K 4 0, alk phos 246, albumin 2 8, total protein 5 6, AST 64, ALT 95, eGFR 60    CURRENT MEDICATIONS:  All medications reviewed and updated in Nursing Home EMR      Provider: Remi JOHNSON  Patient: Junior Edouard   3/20/2023

## 2023-03-23 LAB — EXTERNAL EGFR: 74

## 2023-03-27 PROBLEM — N39.0 SEPSIS DUE TO URINARY TRACT INFECTION (HCC): Status: RESOLVED | Noted: 2023-01-17 | Resolved: 2023-03-27

## 2023-03-27 PROBLEM — A41.9 SEPSIS DUE TO URINARY TRACT INFECTION (HCC): Status: RESOLVED | Noted: 2023-01-17 | Resolved: 2023-03-27

## 2023-03-30 ENCOUNTER — NURSING HOME VISIT (OUTPATIENT)
Dept: GERIATRICS | Facility: OTHER | Age: 63
End: 2023-03-30

## 2023-03-30 DIAGNOSIS — K21.9 GASTROESOPHAGEAL REFLUX DISEASE WITHOUT ESOPHAGITIS: ICD-10-CM

## 2023-03-30 DIAGNOSIS — M86.151 OTHER ACUTE OSTEOMYELITIS OF RIGHT FEMUR (HCC): ICD-10-CM

## 2023-03-30 DIAGNOSIS — E78.49 FAMILIAL COMBINED HYPERLIPIDEMIA: ICD-10-CM

## 2023-03-30 DIAGNOSIS — I10 PRIMARY HYPERTENSION: Primary | ICD-10-CM

## 2023-03-30 NOTE — PROGRESS NOTES
[facility]  [address]  [phone]) [facility] [address] [facility]  [address]      NAME: Flint Nageotte  AGE: 65 y o  SEX: female [phone]    DATE OF ENCOUNTER: 3/30/2023    Assessment and Plan     1  Primary hypertension        2  Familial combined hyperlipidemia        3  Other acute osteomyelitis of right femur ([facility] [address] )        4  Gastroesophageal reflux disease without esophagitis           Hypertension  Stable BP  Pt with fluctuations in BP  125 / 58 mmHg 3/22/2023 14:49    3/22/2023 05:57 126 / 82 mmHg    3/22/2023 01:05 162 / 70 mmHg    3/21/2023 21:54 146 / 66 mmHg    3/21/2023 14:49 117 / 62 mmHg    3/21/2023 03:56 157 / 67 mmHg    3/20/2023 17:48 107 / 53 mmHg    Cont metoprolol, hydralazine, hctz, amlodipine & lisinopril    Familial combined hyperlipidemia  Stable  Cont atorvastatin 40 mg 1 tab po QHS    Osteomyelitis (HCC)  Pt with RT AKA stump osteomyelitis  Pt saw I D  on 4/11/2023  Pt completed 6 weeks of doxycycline/augmentin  Cont cresemba 372 mg 1 tab po qam till 7/27/2023 for 6 month course  Pt still needs cmp every 2 weeks    Esophageal reflux  Stable  Pt no longer requiring ppi  Stable off meds       Code status: full    Chief Complaint     Routine Long term follow-up visit  60 day visit    History of Present Illness   Pt seen and examined as part of 60 day visit  No new complaints  The following portions of the patient's history were reviewed and updated as appropriate: allergies, current medications, past family history, past medical history, past social history, past surgical history and problem list     Review of Systems     Review of Systems   All other systems reviewed and are negative          Active Problem List     Patient Active Problem List   Diagnosis    Esophageal reflux    DM (diabetes mellitus) type II, controlled, with peripheral vascular disorder [facility]    Class 3 severe obesity due to excess calories with serious comorbidity and body mass index (BMI) of 40 0 to 44 9 in adult [facility])    Depression, recurrent ([facility] [address] )    Anxiety    Hypertension    [facility]    Diabetic peripheral neuropathy ([facility])    Vitamin D deficiency    Systolic murmur    Familial combined hyperlipidemia    Arthritis    Transaminitis    Acute colitis    Moderate protein-calorie malnutrition (HCC)    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    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 (HCC)    Type 2 diabetes mellitus with hyperglycemia, with long-term current use of insulin (HCC)    Hyperparathyroidism (HCC)    Wound of lower extremity    Non healing left heel wound    Hypertensive urgency    Hypokalemia    PAD (peripheral artery disease) ([facility])    Generalized edema due to fluid overload    Anemia    Diabetic ulcer of heel associated with diabetes mellitus due to underlying condition ([facility])    HIT (heparin-induced thrombocytopenia) ([facility])    CVA [facility]ce[facility] vascular accident) [facility]    Mild protein-calorie malnutrition ([facility] )    History of CVA (cerebrovascular accident)    Fall    Cardiac arrest ([facility] )    Troponin level elevated    Hypomagnesemia    Bacteremia    Osteomyelitis ([facility])    Prolonged Q-T interval on ECG    Thrombocytosis    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)       Objective     Vitals: Reviewed in [employer] system  VSS    General: Awake, alert & oriented x 3  Head: atraumatic, normocephalic  Cardiovascular: RRR  Lungs: Clear to auscultation bilaterally  Abdomen: nontender/nondistended, +BS  Legs: no cyanosis, clubbing; pt with b/l AKA  Skin: clean, dry, intact  Psych: calm, cooperative    Pertinent Laboratory/Diagnostic Studies:  Refer to facility chart  Current Medications   Medications reviewed and updated in facility chart      Neeraj Rowland MD  Internal Medicine  Senior Care Physician

## 2023-03-31 ENCOUNTER — TELEPHONE (OUTPATIENT)
Dept: INFECTIOUS DISEASES | Facility: CLINIC | Age: 63
End: 2023-03-31

## 2023-03-31 NOTE — TELEPHONE ENCOUNTER
Contacted Sarthak at [facility] [employer] regarding CMP biweekly  They said that they had one done last week and that they would fax it to us

## 2023-04-01 NOTE — ASSESSMENT & PLAN NOTE
Pt initially was confused/anxious  Now resolved  Pt tolerating seroquel 25 mg 1 tab po QHS  Cont wellbutrin xl 150 mg 1 tab po daily  Cont sertraline 100 mg 1 tab po daily  QTc on several ekgs checks wnl

## 2023-04-01 NOTE — ASSESSMENT & PLAN NOTE
Pt with hx of cva  Cont metoprolol for bp control  Cont coumadin; goal INR 2-3  Check weekly INR while on ax

## 2023-04-05 PROBLEM — N39.0 UTI (URINARY TRACT INFECTION) DUE TO ENTEROCOCCUS: Status: RESOLVED | Noted: 2022-10-23 | Resolved: 2023-04-05

## 2023-04-05 PROBLEM — B95.2 UTI (URINARY TRACT INFECTION) DUE TO ENTEROCOCCUS: Status: RESOLVED | Noted: 2022-10-23 | Resolved: 2023-04-05

## 2023-04-13 ENCOUNTER — VBI (OUTPATIENT)
Dept: ADMINISTRATIVE | Facility: OTHER | Age: 63
End: 2023-04-13

## 2023-04-13 LAB
EXTERNAL CREATININE: 1.31
EXTERNAL EGFR: 46

## 2023-04-17 LAB
EXTERNAL CREATININE: 1.39
EXTERNAL EGFR: 43

## 2023-04-20 ENCOUNTER — OFFICE VISIT (OUTPATIENT)
Dept: CARDIOLOGY CLINIC | Facility: CLINIC | Age: 63
End: 2023-04-20

## 2023-04-20 VITALS
WEIGHT: 157 LBS | DIASTOLIC BLOOD PRESSURE: 60 MMHG | OXYGEN SATURATION: 95 % | BODY MASS INDEX: 26.95 KG/M2 | HEART RATE: 76 BPM | SYSTOLIC BLOOD PRESSURE: 118 MMHG

## 2023-04-20 DIAGNOSIS — I63.9 CVA (CEREBRAL VASCULAR ACCIDENT) (HCC): ICD-10-CM

## 2023-05-01 ENCOUNTER — NURSING HOME VISIT (OUTPATIENT)
Dept: GERIATRICS | Facility: OTHER | Age: 63
End: 2023-05-01

## 2023-05-01 DIAGNOSIS — Z79.4 TYPE 2 DIABETES MELLITUS WITH HYPERGLYCEMIA, WITH LONG-TERM CURRENT USE OF INSULIN (HCC): ICD-10-CM

## 2023-05-01 DIAGNOSIS — I73.9 PAD (PERIPHERAL ARTERY DISEASE) (HCC): Chronic | ICD-10-CM

## 2023-05-01 DIAGNOSIS — I63.9 CEREBROVASCULAR ACCIDENT (CVA), UNSPECIFIED MECHANISM (HCC): Primary | ICD-10-CM

## 2023-05-01 DIAGNOSIS — E11.65 TYPE 2 DIABETES MELLITUS WITH HYPERGLYCEMIA, WITH LONG-TERM CURRENT USE OF INSULIN (HCC): ICD-10-CM

## 2023-05-01 DIAGNOSIS — F41.9 ANXIETY: ICD-10-CM

## 2023-05-01 NOTE — ASSESSMENT & PLAN NOTE
Pt on coumadin 5mg 1 tab po daily  Goal INR 2-3  Pt subtherapeutic today at 1 8  Give coumadin 6 mg today  5/2 & 5/3 give coumadin 5 mg  Repeat INR on 5/4

## 2023-05-01 NOTE — PROGRESS NOTES
[facility]  [address]  [phone]) [facility] [address] [facility]  [address]      NAME: Steven Sibley  AGE: 65 y o  SEX: female [phone]    DATE OF ENCOUNTER: 5/1/2023    Assessment and Plan     1  Cerebrovascular accident (CVA), unspecified mechanism ([facility] [address] )        2  PAD (peripheral artery disease) ([facility] )        3  Anxiety        4  Type 2 diabetes mellitus with hyperglycemia, with long-term current use of insulin (HCC)           Stroke (HCC)  Pt on coumadin 5mg 1 tab po daily  Goal INR 2-3  Pt subtherapeutic today at 1 8  Give coumadin 6 mg today  5/2 & 5/3 give coumadin 5 mg  Repeat INR on 5/4    PAD [facility]per[facility]) [facility]  Lt stump stable  S/p left aka 11/23/2022  S/p rt aka stump revision 1/27/2023  Pt with water blister on Rt aka stump  Wound care consult & apply topical ointment  Assist with adls/ialds  Fall precaution  Cont asa/statin    Anxiety  Stable with meds  Cont care per psych  Pt on sertraline, wellbutrin, xanax, and seroquel    Type 2 diabetes mellitus with hyperglycemia, with long-term current use of insulin ([facility] )  Sugars well controlled  Lab Results   Component Value Date    HGBA1C 9 8 (H) 10/25/2022     96 0 mg/dL 4/28/2023 06:22    4/21/2023 05:11 105 0 mg/dL    4/14/2023 05:06 99 0 mg/dL    4/7/2023 05:09 93 0 mg/dL    3/31/2023 05:56 101 0 mg/dL    3/29/2023 05:05 97 0 mg/dL    3/27/2023 05:05 107 0 mg/dL    3/24/2023 05:01 116 0 mg/dL    3/22/2023 05:15 26 0 PB/RX    Cont trulicity & lantus  Monitor for signs of hypoglycemia         Code status: full    Chief Complaint     Routine Long term follow-up visit  60 day visit orders signed    History of Present Illness     Pt seen and examined       The following portions of the patient's history were reviewed and updated as appropriate: allergies, current medications, past family history, past medical history, past social history, past surgical history and problem list     Review of Systems     Review of Systems   All other systems reviewed and are negative  pt with new water blister on Rt AKA    Active Problem List     Patient Active Problem List   Diagnosis    Esophageal reflux    DM (diabetes mellitus) type II, controlled, with peripheral vascular disorder ([facility] [address] )    Class 3 severe obesity due to excess calories with serious comorbidity and body mass index (BMI) of 40 0 to 44 9 in adult ([facility] [address] )    Depression, recurrent ([facility] [address] )    Anxiety    Hypertension    Stroke ([facility] [address] )    Diabetic peripheral neuropathy ([facility])    Vitamin D deficiency    Systolic murmur    Familial combined hyperlipidemia    Arthritis    Transaminitis    Acute colitis    Moderate protein-calorie malnutrition (HCC)    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    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 ([facility])    Type 2 diabetes mellitus with hyperglycemia, with long-term current use of insulin ([facility])    Hyperparathyroidism ([facility] [address] )    Wound of lower extremity    Non healing left heel wound    Hypertensive urgency    Hypokalemia    PAD (peripheral artery disease) (HCC)    Generalized edema due to fluid overload    Anemia    Diabetic ulcer of heel associated with diabetes mellitus due to underlying condition ([facility] )    HIT (heparin-induced thrombocytopenia) ([facility])    CVA (cerebral vascular accident) ([facility] )    Mild protein-calorie malnutrition ([facility] )    History of CVA (cerebrovascular accident)    Fall    Cardiac arrest ([facility] )    Troponin level elevated    Hypomagnesemia    Bacteremia    Osteomyelitis (HCC)    Prolonged Q-T interval on ECG    Thrombocytosis    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)       Objective     Vitals: Reviewed in [employer] system   VSS    General: Awake, alert & oriented x 3  Head: atraumatic, normocephalic  Cardiovascular: RRR  Lungs: Clear to auscultation bilaterally  Abdomen: nontender/nondistended, +BS  Legs: no cyanosis, "clubbing; b/l aka; rt aka with water blister at right lateral  Skin: clean, dry, intact  Psych: calm, cooperative    Per [employer]:  \"  8/14/2499 14:35 Nurses Note   Note Text: Linear fluid filled blister noted to (R)lateral thigh - order obtained for 3M No Sting topically  Pertinent Laboratory/Diagnostic Studies:  Refer to facility chart  Current Medications   Medications reviewed and updated in facility chart      Milk of Magnesia Suspension 2400 MG/30ML  1/12/2023       Bisac-Evac Suppository 10 MG (Bisacodyl) Insert 1 suppository rectally as needed for Constipation 1 Time a Day, If NO BM by AM Day # 4, Day Nurse Gives a Suppository that AM Pharmacy Active 1/12/2023 14:09  1/12/2023   Enema Disposable Enema (Sodium Phosphates) Insert 1 application rectally as needed for Constipation 1 Time a Day, If NO BM within 3-5 Hours of Suppository Insertion Give Enema Pharmacy Active 1/12/2023 14:09  1/12/2023   AMLODIPINE 10MG TABLET GIVE 1 TABLET BY MOUTH ONCE DAILY(Related Diagnoses: ESSENTIAL (PRIMARY) HYPERTENSION (I10)) Pharmacy Active 1/13/2023 09:00  1/12/2023   ATORVASTATIN 40MG TAB GIVE 1 TABLET BY MOUTH ONCE DAILY(Related Diagnoses: OTHER HYPERLIPIDEMIA (E78 49))(Indications for Use: hyperlipidemia) Pharmacy Active 1/12/2023 17:00  1/13/2023   BUPROPION XL 150MG TABLET GIVE 1 TABLET BY MOUTH ONCE DAILY *DO NOT CRUSH*(Related Diagnoses: ANXIETY DISORDER, UNSPECIFIED (F41 9)) Pharmacy Active 1/13/2023 09:00  1/12/2023   GABAPENTIN 100MG CAPSULE GIVE 2 TABLETS (200MG) BY MOUTH TWICE DAILY(Related Diagnoses: TYPE 2 DIABETES MELLITUS WITH DIABETIC POLYNEUROPATHY (E11 42))(Indications for Use: neuropathy) Pharmacy Active 1/12/2023 17:00  1/13/2023   LISINOPRIL 40 MG TABLET GIVE 1 TABLET BY MOUTH ONCE DAILY(Related Diagnoses: ESSENTIAL (PRIMARY) HYPERTENSION (I10)) Pharmacy Active 1/13/2023 09:00  1/12/2023   MULTIVITAMIN TABLET GIVE 1 TABLET BY MOUTH ONCE DAILY(Related Diagnoses: VITAMIN DEFICIENCY, " UNSPECIFIED (E56 9))(Indications for Use: supplement) Pharmacy Active 1/13/2023 09:00  1/13/2023   SERTRALINE 100MG TABLET GIVE 1 TABLET BY MOUTH ONCE DAILY AT BEDTIME(Related Diagnoses: MAJOR DEPRESSIVE DISORDER, RECURRENT, UNSPECIFIED (F33 9))(Indications for Use: depression) Pharmacy Active 1/12/2023 21:00  3/91/5442   TRULICITY 9 26TP/7 3DV INJ PEN INJECT 0 5ML (0 75MG) SQ EVERY WEEK ON TUESDAY *REFRIGERATE UNTIL OPEN, THEN 14 DAYS AT ROOM TEMPERATURE*(Related Diagnoses: TYPE 2 DIABETES MELLITUS WITH DIABETIC POLYNEUROPATHY (E11 42))(Indications for Use: DM) Pharmacy Active 1/17/2023 09:00  1/13/2023   HYDRALAZINE 100MG TAB GIVE 1 TABLET BY MOUTH EVERY 8 HOURS FOR HYPERTENSION(Related Diagnoses: ESSENTIAL (PRIMARY) HYPERTENSION (I10))(Indications for Use: HTN) Pharmacy Active 2/3/2023 22:00  2/9/2023   HYDROCHLOROTHIAZIDE 12 5MG TAB GIVE 1 TABLET BY MOUTH ONCE DAILY FOR HYPERTENSION(Related Diagnoses: ESSENTIAL (PRIMARY) HYPERTENSION (I10))(Indications for Use: HTN) Pharmacy Active 2/4/2023 09:00  2/9/2023   LANTUS SOLOSTAR 100UNITS/ML INJECT 20 UNITS SQ ONCE DAILY FOR DIABETES MELLITUS *REFRIGERATE UNTIL OPEN, THEN 28 DAYS AT ROOM TEMPERATURE*(Related Diagnoses: TYPE 2 DIABETES MELLITUS WITH DIABETIC POLYNEUROPATHY (E11 42))(Indications for Use: DM2) Pharmacy Active 2/4/2023 08:00  2/9/2023   CRESEMBA 186MG CAPSULE GIVE 2 CAPSULES (372MG) BY MOUTH EVERY MORNING FOR FUNGAL INFECTION *DO NOT CRUSH*  (Related Diagnoses: INFECTION OF AMPUTATION STUMP, RIGHT LOWER EXTREMITY (T87 43))(Indications for Use: fungal infection/polymicrobial bacteremia) Pharmacy Active 2/6/2023 09:00  2/9/2023   SLOW MAG 64MG W/CALCIUM EC TAB GIVE 1 TABLET BY MOUTH ONCE DAILY FOR HYPOMAGNESIUM(Related Diagnoses: HYPOMAGNESEMIA (E83 42))(Indications for Use: hypomagnesemia) Pharmacy Active 2/4/2023 09:00  2/9/2023   VITAMIN B-1 100MG TABLET GIVE 1 TABLET BY MOUTH ONCE DAILY FOR SUPPLEMENT(Related Diagnoses: VITAMIN DEFICIENCY, UNSPECIFIED (E56 9)) Pharmacy Active 2/4/2023 09:00  2/9/2023   QUETIAPINE 25MG TABLET GIVE 1 TABLET BY MOUTH ONCE DAILY AT BEDTIME FOR HALLUCINATIONS(Related Diagnoses: HALLUCINATIONS, UNSPECIFIED (R44 3)) Pharmacy Active 2/8/2023 21:00  2/9/2023   METOPROLOL TART 50MG TABLET GIVE 1 TABLET BY MOUTH EVERY 12 HOURS FOR HYPERTENSION(Related Diagnoses: ESSENTIAL (PRIMARY) HYPERTENSION (I10)) Pharmacy Active 3/3/2023 21:00  3/3/2023   ASPIRIN EC 81MG TABLET GIVE 1 TABLET BY MOUTH ONCE DAILY(Related Diagnoses: PERSONAL HISTORY OF TRANSIENT ISCHEMIC ATTACK (TIA), AND CEREBRAL INFARCTION WITHOUT RESIDUAL DEFICITS (Z86 73)) Pharmacy Active 3/11/2023 09:00  3/10/2023   ALPRAZOLAM 0 25MG TABLET GIVE 1 TABLET BY MOUTH ONCE DAILY AS NEEDED FOR ANXIETY (PRIOR TO APPOINTMENTS) FOR UP TO 14 DAYS Pharmacy Active 4/20/2023 00:00  4/20/2023   AMIODARONE 100MG TABLET GIVE 1 TABLET BY MOUTH ONCE DAILY(Indications for Use: murmur) Pharmacy Active 4/21/2023 09:00  4/20/2023   MiraLax Powder (Polyethylene Glycol 3350) Give 17 gram orally every 24 hours as needed for constipation Pharmacy Active 4/21/2023 10:45  4/21/2023   Acetaminophen Tablet 325 MG Give 2 tablet by mouth every 4 hours as needed for Mild Pain Max 3 GM APAP / 24 HR, Give Suppository If Unable to Take By Mouth AND Give 2 tablet by mouth every 4 hours as needed for Temperature = or > 100 degrees Oral / 101 degrees Rectal Max 3 GM APAP / 24 HR, Give Suppository If Unable to Take By Mouth Pharmacy Active 4/24/2023 09:46  4/24/2023   Acetaminophen Suppository 650 MG Insert 1 suppository rectally every 4 hours as needed for Mild Pain Max 3 GM APAP / 24 HR, Give Suppository If Unable to Take By Mouth AND Insert 1 suppository rectally every 4 hours as needed for Temperature = or > 100 degrees Oral / 101 degrees Rectal Max 3 GM APAP / 24 HR, Give Suppository If Unable to Take By Mouth Pharmacy Active 4/24/2023 09:47  4/24/2023   WARFARIN SODIUM 5MG TABLET GIVE 1 TABLET BY MOUTH ONCE DAILY FOR 2 DAYS *HANDLING PRECAUTION: WEAR GLOVES*(Indications for Use: atrial fib) Pharmacy             Timmy Mendoza MD  Internal Medicine  Senior Care Physician

## 2023-05-01 NOTE — ASSESSMENT & PLAN NOTE
Sugars well controlled  Lab Results   Component Value Date    HGBA1C 9 8 (H) 10/25/2022     96 0 mg/dL 4/28/2023 06:22    4/21/2023 05:11 105 0 mg/dL    4/14/2023 05:06 99 0 mg/dL    4/7/2023 05:09 93 0 mg/dL    3/31/2023 05:56 101 0 mg/dL    3/29/2023 05:05 97 0 mg/dL    3/27/2023 05:05 107 0 mg/dL    3/24/2023 05:01 116 0 mg/dL    3/22/2023 05:15 89 3 ON/VH    Cont trulicity & lantus  Monitor for signs of hypoglycemia

## 2023-05-01 NOTE — ASSESSMENT & PLAN NOTE
Stable BP  Pt with fluctuations in BP  125 / 58 mmHg 3/22/2023 14:49    3/22/2023 05:57 126 / 82 mmHg    3/22/2023 01:05 162 / 70 mmHg    3/21/2023 21:54 146 / 66 mmHg    3/21/2023 14:49 117 / 62 mmHg    3/21/2023 03:56 157 / 67 mmHg    3/20/2023 17:48 107 / 53 mmHg    Cont metoprolol, hydralazine, hctz, amlodipine & lisinopril

## 2023-05-01 NOTE — ASSESSMENT & PLAN NOTE
Lt stump stable  S/p left aka 11/23/2022  S/p rt aka stump revision 1/27/2023  Pt with water blister on Rt aka stump  Wound care consult & apply topical ointment  Assist with adls/ialds  Fall precaution  Cont asa/statin

## 2023-05-01 NOTE — ASSESSMENT & PLAN NOTE
Pt with RT AKA stump osteomyelitis  Pt saw I D  on 4/11/2023  Pt completed 6 weeks of doxycycline/augmentin  Cont cresemba 372 mg 1 tab po qam till 7/27/2023 for 6 month course  Pt still needs cmp every 2 weeks

## 2023-05-08 ENCOUNTER — TELEPHONE (OUTPATIENT)
Dept: INFECTIOUS DISEASES | Facility: CLINIC | Age: 63
End: 2023-05-08

## 2023-05-08 NOTE — TELEPHONE ENCOUNTER
Contacted [facility] and spoke with Karen Gracia  Informed her that the last labs we have on pt is from 4/17 and we need recent labs faxed over to our office  Karen Gracia states they have labs from 5/1 and will fax over

## 2023-05-15 LAB — EXTERNAL EGFR: 54

## 2023-05-16 ENCOUNTER — TELEPHONE (OUTPATIENT)
Dept: INFECTIOUS DISEASES | Facility: CLINIC | Age: 63
End: 2023-05-16

## 2023-05-16 NOTE — TELEPHONE ENCOUNTER
Incoming faxz with CMP, CBC no diff, and PT INR faxed to this office  Scanned into patient's chart at this time

## 2023-05-16 NOTE — TELEPHONE ENCOUNTER
Contacted [facility] to obtain updated labs on pt  Spoke with Eliza Corley who states she is going to fax over labs from yesterday       Provided our office fax number

## 2023-05-23 ENCOUNTER — TELEMEDICINE (OUTPATIENT)
Dept: INFECTIOUS DISEASES | Facility: CLINIC | Age: 63
End: 2023-05-23

## 2023-05-23 VITALS
BODY MASS INDEX: 26.8 KG/M2 | RESPIRATION RATE: 18 BRPM | TEMPERATURE: 97 F | HEIGHT: 64 IN | DIASTOLIC BLOOD PRESSURE: 59 MMHG | OXYGEN SATURATION: 99 % | HEART RATE: 72 BPM | WEIGHT: 157 LBS | SYSTOLIC BLOOD PRESSURE: 148 MMHG

## 2023-05-23 DIAGNOSIS — R94.31 PROLONGED Q-T INTERVAL ON ECG: ICD-10-CM

## 2023-05-23 DIAGNOSIS — E11.65 TYPE 2 DIABETES MELLITUS WITH HYPERGLYCEMIA, WITH LONG-TERM CURRENT USE OF INSULIN (HCC): ICD-10-CM

## 2023-05-23 DIAGNOSIS — M86.151 OTHER ACUTE OSTEOMYELITIS OF RIGHT FEMUR (HCC): Primary | ICD-10-CM

## 2023-05-23 DIAGNOSIS — Z79.4 TYPE 2 DIABETES MELLITUS WITH HYPERGLYCEMIA, WITH LONG-TERM CURRENT USE OF INSULIN (HCC): ICD-10-CM

## 2023-05-23 DIAGNOSIS — R78.81 BACTEREMIA: ICD-10-CM

## 2023-05-23 DIAGNOSIS — R74.01 TRANSAMINITIS: ICD-10-CM

## 2023-05-23 RX ORDER — ACETAMINOPHEN 325 MG/1
650 TABLET ORAL EVERY 4 HOURS PRN
COMMUNITY

## 2023-05-23 RX ORDER — POLYETHYLENE GLYCOL 3350 17 G/17G
17 POWDER, FOR SOLUTION ORAL DAILY PRN
COMMUNITY

## 2023-05-23 RX ORDER — ASPIRIN 81 MG/1
81 TABLET, CHEWABLE ORAL DAILY
COMMUNITY

## 2023-05-23 RX ORDER — ACETAMINOPHEN 650 MG/1
650 SUPPOSITORY RECTAL EVERY 4 HOURS PRN
COMMUNITY

## 2023-05-23 NOTE — ASSESSMENT & PLAN NOTE
R AKA stump OM and abscess complicated by polymicrobial bacteremia   Patient initially found to have positive blood cultures with Enterobacter and Enterococcus faecalis   Work up revealed likely source to be R AKA stump based on prior wound cultures   CT imaging of the R stump concerning for osteomyelitis at the femoral stump   She was taken to the OR 1/27 for debridement   OR cultures with Enterobacter, Enterococcus and candida albicans   2D echo done and limited, LEON without valvular vegetation   Patient's antifungal options limited due to prolonged QTC   She completed 2 weeks of IV ampicillin for enterococcal bacteremia and was then switched to po augmentin for bone coverage, 7 days of meropenem for enterobacter bacteremia then switched to po doxycyline for bone coverage and micafungin that was then changed to cresemba for bone coverage  Patient has now completed 6 weeks of both doxycycline and augmentin for bacterial OM  She remains on po cresemba to cover the fungal portion of OM       Patient continues tolerate the cresemba  Her LFTs do remain elevated but stable  I think that we can continue to push on in hopes of completing the full 6 months and watch LFTs closely    Patient in agreement        Plan  - continue cresemba 372 mg daily as ordered through 7/27/2023 to complete a total of 6 months   - continue CMP every 2 weeks   - RTO in 6 -8  weeks

## 2023-05-23 NOTE — PROGRESS NOTES
Virtual Regular Visit    Verification of patient location:    Patient is located at Other in the following state in which I hold an active license [place]      Assessment/Plan:    Problem List Items Addressed This Visit        Endocrine    Type 2 diabetes mellitus with hyperglycemia, with long-term current use of insulin ([facility] [address] )       Lab Results   Component Value Date    HGBA1C 9 8 (H) 10/25/2022               Musculoskeletal and Integument    Osteomyelitis ([facility] [address] ) - Primary     R AKA stump OM and abscess complicated by polymicrobial bacteremia   Patient initially found to have positive blood cultures with Enterobacter and Enterococcus faecalis   Work up revealed likely source to be R AKA stump based on prior wound cultures   CT imaging of the R stump concerning for osteomyelitis at the femoral stump   She was taken to the OR 1/27 for debridement   OR cultures with Enterobacter, Enterococcus and candida albicans   2D echo done and limited, LEON without valvular vegetation   Patient's antifungal options limited due to prolonged QTC   She completed 2 weeks of IV ampicillin for enterococcal bacteremia and was then switched to po augmentin for bone coverage, 7 days of meropenem for enterobacter bacteremia then switched to po doxycyline for bone coverage and micafungin that was then changed to cresemba for bone coverage  Patient has now completed 6 weeks of both doxycycline and augmentin for bacterial OM  She remains on po cresemba to cover the fungal portion of OM       Patient continues tolerate the cresemba  Her LFTs do remain elevated but stable  I think that we can continue to push on in hopes of completing the full 6 months and watch LFTs closely  Patient in agreement        Plan  - continue cresemba 372 mg daily as ordered through 7/27/2023 to complete a total of 6 months   - continue CMP every 2 weeks   - RTO in 6 -8  weeks            Other    Transaminitis     Elevated but stable    Continue to closely monitor while patient on cresemba         Bacteremia     Enterobacter and Enterococcus faecalis bacteremia secondary to R AKA stump infection   LEON/TTE without vegetation   Completed 7 days of meropenem and 2 weeks of Ampicillin   No further treatment needed  Prolonged Q-T interval on ECG      Noted to have prolonged QTC interval therefore fluconazole not an option                      Reason for visit is   Chief Complaint   Patient presents with   • Follow-up        Encounter provider Bianka Bee PA-C    Provider located at [address] [place] [id number] [phone]      Recent Visits  Date Type Provider Dept   05/16/23 Telephone Andrew 31 recent visits within past 7 days and meeting all other requirements  Today's Visits  Date Type Provider Dept   05/23/23 Telemedicine Bianka Bee PA-C [facility]   Showing today's visits and meeting all other requirements  Future Appointments  No visits were found meeting these conditions  Showing future appointments within next 150 days and meeting all other requirements       The patient was identified by name and date of birth  Courtney Bazzi was informed that this is a telemedicine visit and that the visit is being conducted through the [address] [employer] platform  She agrees to proceed     My office door was closed  No one else was in the room  She acknowledged consent and understanding of privacy and security of the video platform  The patient has agreed to participate and understands they can discontinue the visit at any time  Patient is aware this is a billable service  Subjective  Courtney Bazzi is a 65 y o  female presents for virtual follow up today regarding R AKA stump OM and abscess complicated by polymicrobial bacteremia   Patient completed a 6 week course of doxycycline and augmentin for bacterial OM and remains on po cresemba to complete a 6 month course  She continues to tolerate the cresemba well  She has no new complaints  She denies any major medical changes or medication changes since last seen  HPI     Past Medical History:   Diagnosis Date   • Altered mental status 1/24/2023   • Anxiety    • Depression    • Diabetes ([facility] [address] )    • Diabetes mellitus ([facility] )    • Diarrhea 11/14/2022   • Epigastric pain 2/20/2022   • Esophageal reflux     28 APR 2015 RESOLVED   • Fall 1/17/2023   • Forehead laceration 1/23/2023   • Hyperlipidemia    • Hypertension    • Low back pain     sciatica   • Pneumonia 2019   • Stroke ([facility] [address] ) 12/2015    small vessel, L frontal corona radiata  Rx asa 81, Lipitor 40, risk modification   • Vitamin D deficiency        Past Surgical History:   Procedure Laterality Date   • AMPUTATION ABOVE KNEE (AKA) Right 12/1/2022    Procedure: (AKA), PSB  BKA;  Surgeon: Diego Jackson DO;  Location: [facility];  Service: Vascular   • ARTERIOGRAM Right 11/7/2022    Procedure: -Right lower extremity angiogram -Right CFA balloon angioplasty with [id number]  [employer]  -Right CFA DCB with 6x40 Bard Lutonix -Right CFA atherectomy with Jetstream and distal embolization protection with 7mm Spider FX -Right SFA/Pop angioplasty with 4x40 Chololate balloon -Right SFA/Pop DCB with 5x150 Bard Lutonix -Right SFA stent with 6x80 W W  [employer] x2 -R   • COLONOSCOPY     • IR AORTAGRAM WITH RUN-OFF  10/31/2022   • IR LOWER EXTREMITY ANGIOGRAM  11/10/2022   • IR LOWER EXTREMITY ANGIOGRAM  11/7/2022   • OR AMP LEG THRU TIBIA&FIBULA SEC CLOSURE/SCAR REV Right 1/27/2023    Procedure: AMPUTATION REVISION STUMP;  Surgeon: Diego Jackson DO;  [facility];  Service: Vascular   • OR AMPUTATION THIGH THROUGH FEMUR ANY LEVEL Left 11/23/2022    Procedure: (AKA);   Surgeon: Ashley Leyva DO;  [facility];  Service: Vascular   • OR NEGATIVE PRESSURE WOUND THERAPY DME </= 50 SQ CM Bilateral 12/19/2022    Procedure: Right above knee amputation washout; vac change; Left above knee amputation debridement; vac placement;  Surgeon: Corine Thompson DO;  [facility];  Service: Vascular   • WOUND DEBRIDEMENT Right 12/16/2022    Procedure: AKA STUMP WASHOUT, Left AKA Staple removal  application of wound vac to Right AKA;   Surgeon: Emile Fitzgerald MD;  [facility]: [facility];  Service: Vascular       Current Outpatient Medications   Medication Sig Dispense Refill   • acetaminophen (TYLENOL) 325 mg tablet Take 650 mg by mouth every 4 (four) hours as needed for mild pain     • acetaminophen (TYLENOL) 650 mg suppository Insert 650 mg into the rectum every 4 (four) hours as needed for mild pain     • ALPRAZolam (XANAX) 0 25 mg tablet Take 1 tablet (0 25 mg total) by mouth daily as needed for anxiety (prior to appointments) for up to 14 days 14 tablet 0   • amiodarone 200 mg tablet Take 1 tablet (200 mg total) by mouth daily     • amLODIPine (NORVASC) 10 mg tablet TAKE 1 TABLET BY MOUTH EVERY DAY 90 tablet 1   • aspirin 81 mg chewable tablet Chew 81 mg daily     • atorvastatin (LIPITOR) 40 mg tablet TAKE 1 TABLET BY MOUTH EVERY DAY 90 tablet 1   • buPROPion (WELLBUTRIN XL) 150 mg 24 hr tablet Take 1 tablet (150 mg total) by mouth daily 30 tablet 0   • Continuous Blood Gluc  (FreeStyle Noé 14 Day [employer]) LITO Use 1 Units continuous 1 each 0   • Continuous Blood Gluc Sensor (FreeStyle Noé 14 Day Sensor) MISC Use daily as directed for CGM - Change every 14 days 2 each 1   • gabapentin (NEURONTIN) 100 mg capsule TAKE 2 CAPSULES BY MOUTH TWICE A  capsule 0   • hydrALAZINE (APRESOLINE) 100 MG tablet Take 1 tablet (100 mg total) by mouth every 8 (eight) hours  0   • hydrochlorothiazide (HYDRODIURIL) 12 5 mg tablet Take 1 tablet (12 5 mg total) by mouth daily Do not start before February 4, 2023   0   • insulin glargine (LANTUS) 100 units/mL subcutaneous injection Inject 20 Units under the skin every morning Do not start before February 4, 2023  10 mL 0   • Insulin Pen Needle (B-D UF III MINI PEN NEEDLES) 31G X 5 MM MISC USE WITH INSULIN FOUR TIMES DAILY 200 each 3   • Isavuconazonium Sulfate 186 MG CAPS Take 2 capsules (372 mg total) by mouth 3 (three) times a day for 2 days, THEN 2 capsules (372 mg total) in the morning  (Patient taking differently: 2 capsules (372 mg total) in the morning )  0   • Lancets 28G MISC Use 1 each 4 (four) times a day 400 each 0   • lisinopril (ZESTRIL) 40 mg tablet TAKE 1 TABLET BY MOUTH EVERY DAY 90 tablet 1   • Magnesium Cl-Calcium Carbonate (SLOW MAGNESIUM/CALCIUM PO) Take 1 tablet by mouth daily     • metoprolol tartrate (LOPRESSOR) 50 mg tablet Take 1 tablet (50 mg total) by mouth every 12 (twelve) hours  0   • Multiple Vitamin (multivitamin) tablet Take 1 tablet by mouth daily     • polyethylene glycol (MIRALAX) 17 g packet Take 17 g by mouth daily     • QUEtiapine (SEROquel) 25 mg tablet Take 1 tablet (25 mg total) by mouth daily at bedtime 30 tablet 0   • sertraline (ZOLOFT) 100 mg tablet TAKE 1 TABLET BY MOUTH EVERY DAY 90 tablet 1   • thiamine (VITAMIN B1) 100 mg tablet Take 1 tablet (100 mg total) by mouth daily Do not start before February 4, 2023   0   • warfarin (COUMADIN) 4 mg tablet Take 1 tablet (4 mg total) by mouth daily (Patient taking differently: Take 5 mg by mouth daily)     • clotrimazole-betamethasone (LOTRISONE) 1-0 05 % cream Apply topically 2 (two) times a day (Patient not taking: Reported on 5/23/2023) 30 g 0   • fluocinonide (LIDEX) 0 05 % ointment Apply topically 2 (two) times a day (Patient not taking: Reported on 4/14/2023) 30 g 0   • magnesium chloride (MAG64) 64 MG TBEC EC tablet Take 1 tablet (64 mg total) by mouth daily Do not start before February 4, 2023   (Patient not taking: Reported on 5/23/2023)  0   • methocarbamol 1000 MG TABS Take 1,000 mg by mouth every 8 (eight) hours (Patient not taking: Reported on 5/23/2023) 30 tablet 0 "  • pantoprazole (PROTONIX) 40 mg tablet Take 1 tablet (40 mg total) by mouth daily for 14 days 14 tablet 0   • potassium chloride (K-DUR,KLOR-CON) 10 mEq tablet Take 2 tablets (20 mEq total) by mouth 2 (two) times a day for 2 doses (Patient not taking: Reported on 5/23/2023) 4 tablet 0   • Trulicity 1 45 EZ/0 1CF SOPN INJECT 0 5 ML (0 75 MG TOTAL) UNDER THE SKIN ONCE A WEEK TUESDAY (Patient not taking: Reported on 5/23/2023) 2 mL 1     No current facility-administered medications for this visit  Allergies   Allergen Reactions   • Heparin Other (See Comments)     History of HIT       Review of Systems   Constitutional: Negative for chills and fever  Respiratory: Negative for cough and shortness of breath  Gastrointestinal: Negative for abdominal pain, diarrhea, nausea and vomiting  Skin: Negative for rash  Psychiatric/Behavioral: Negative for behavioral problems and confusion  Video Exam    Vitals:    05/23/23 1035   BP: 148/59   Pulse: 72   Resp: 18   Temp: (!) 97 °F (36 1 °C)   SpO2: 99%   Weight: 71 2 kg (157 lb)   Height: 5' 4\" (1 626 m)       Physical Exam  Vitals reviewed  Constitutional:       General: She is not in acute distress  Appearance: Normal appearance  She is not ill-appearing, toxic-appearing or diaphoretic  HENT:      Head: Normocephalic and atraumatic  Eyes:      General: No scleral icterus  Right eye: No discharge  Left eye: No discharge  Conjunctiva/sclera: Conjunctivae normal    Pulmonary:      Effort: Pulmonary effort is normal  No respiratory distress  Skin:     Coloration: Skin is not jaundiced or pale  Findings: No erythema or rash  Neurological:      Mental Status: She is alert and oriented to person, place, and time     Psychiatric:         Mood and Affect: Mood normal          Behavior: Behavior normal         Labs  5/15/2023  Alk phos: 196  AST: 78  ALT: 82  Wbc: 6 4  Hgb: 9 9  Plt: 253      Visit Time  Total Visit Duration: " total of 30 minutes spent on today's visit including face to face time with the patient as well as chart/lab review and documentation

## 2023-05-28 NOTE — PROGRESS NOTES
Cardiology Follow Up    Kary Figueroa  1960  [id number]  [address] [facility] [address]  [phone]    1  CVA (cerebral vascular accident) [facility])  Ambulatory Referral to Cardiology            Discussion/Summary: This is the 1st time I have met her  Clinically she is very stable from a cardiac standpoint  I have reviewed her medications and will decrease her Amiodarone dose to 100 mg daily  The rest of her medications remain the same  No cardiac testing is ordered  RTO 6 months  Interval History: He is seen in hospital follow up after admission 1/2023 for severe sepsis, severe electrolyte abnormality, cardiac arrest  Severe anemia, HTN  She has a history of previous CVA  She has not had any cardiac problems since this admission  She denies CP, SOB, palpitations  LEON 1/27/2023 - EF 60%, LVH, no significant valve problems  ECG 1/31/2023 - NSR, non specific ST abnl  She is presently on Amiodarone 200 mg daily      /60          Patient Active Problem List   Diagnosis   • Esophageal reflux   • DM (diabetes mellitus) type II, controlled, with peripheral vascular disorder (HCC)   • Class 3 severe obesity due to excess calories with serious comorbidity and body mass index (BMI) of 40 0 to 44 9 in adult [facility])   • Depression, recurrent (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)   • Type 2 diabetes mellitus with hyperglycemia, with long-term current use of insulin ([address] )   • Hyperparathyroidism ([address] )   • Wound of lower extremity   • Non healing left heel wound   • Hypertensive urgency   • Hypokalemia   • PAD (peripheral artery disease) ([facility])   • Generalized edema due to fluid overload   • Anemia   • Diabetic ulcer of heel associated with diabetes mellitus due to underlying condition [facility])   • HIT (heparin-induced thrombocytopenia) ([facility])   • CVA (cerebral vascular accident) ([address] )   • Mild protein-calorie malnutrition ([facility] )   • History of CVA (cerebrovascular accident)   • Fall   • Cardiac arrest ([facility] )   • Troponin level elevated   • Hypomagnesemia   • Bacteremia   • Osteomyelitis ([facility])   • Prolonged Q-T interval on ECG   • Thrombocytosis   • 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 ([facility] )     Past Medical History:   Diagnosis Date   • Altered mental status 2023   • Anxiety    • Depression    • Diabetes ([place] [address] )    • Diabetes mellitus ([place] [address] )    • Diarrhea 2022   • Epigastric pain 2022   • Esophageal reflux     2015 RESOLVED   • Fall 2023   • Forehead laceration 2023   • Hyperlipidemia    • Hypertension    • Low back pain     sciatica   • Pneumonia    • Stroke ([facility] ) 2015    small vessel, L frontal corona radiata   Rx asa 81, Lipitor 40, risk modification   • Vitamin D deficiency      Social History     Socioeconomic History   • Marital status: Single     Spouse name: Not on file   • Number of children: Not on file   • Years of education: Not on file   • Highest education level: Not on file   Occupational History   • Not on file   Tobacco Use   • Smoking status: Former     Types: Cigarettes     Quit date:      Years since quittin 4   • Smokeless tobacco: Never   Vaping Use   • Vaping Use: Never used   Substance and Sexual Activity   • Alcohol use: Never     Comment: OCCASIONAL ALCOHOL USE IN ALL SCRIPTS   • Drug use: No   • Sexual activity: Not on file   Other Topics Concern   • Not on file   Social History Narrative   • Not on file     Social Determinants of Health     Financial Resource Strain: Not on file   Food Insecurity: No Food Insecurity (1/19/2023)    Hunger Vital Sign    • Worried About Running Out of Food in the Last Year: Never true    • Ran Out of Food in the Last Year: Never true   Transportation Needs: Not on file   Physical Activity: Not on file   Stress: Not on file   Social Connections: Not on file   Intimate Partner Violence: Not on file   Housing Stability: [address]  (1/19/2023)    Housing Stability Vital Sign    • Unable to Pay for Housing in the Last Year: No    • Number of Places Lived in the Last Year: 1    • Unstable Housing in the Last Year: No      Family History   Problem Relation Age of Onset   • Diabetes Mother    • Lung cancer Mother    • Cancer Father    • Lung cancer Father    • Hypertension Brother    • Hypertension Family      Past Surgical History:   Procedure Laterality Date   • AMPUTATION ABOVE KNEE (AKA) Right 12/1/2022    Procedure: (AKA), PSB  BKA;  Surgeon: Pamela Clifton DO;  [facility];  Service: Vascular   • ARTERIOGRAM Right 11/7/2022    Procedure: -Right lower extremity angiogram -Right CFA balloon angioplasty with [id number]  [employer]  -Right CFA DCB with 6x40 Bard Lutonix -Right CFA atherectomy with Jetstream and distal embolization protection with 7mm Spider FX -Right SFA/Pop angioplasty with 4x40 Chololate balloon -Right SFA/Pop DCB with 5x150 Bard Lutonix -Right SFA stent with 6x80 W W  [employer] x2 -R   • COLONOSCOPY     • IR AORTAGRAM WITH RUN-OFF  10/31/2022   • IR LOWER EXTREMITY ANGIOGRAM  11/10/2022   • IR LOWER EXTREMITY ANGIOGRAM  11/7/2022   • VT AMP LEG THRU TIBIA&FIBULA SEC CLOSURE/SCAR REV Right 1/27/2023    Procedure: AMPUTATION REVISION STUMP;  Surgeon: Pamela Clifton DO;  [facility];  Service: Vascular   • VT AMPUTATION THIGH THROUGH FEMUR ANY LEVEL Left 11/23/2022    Procedure: (AKA);   Surgeon: Earnest López DO;  [facility];  Service: Vascular   • VT NEGATIVE PRESSURE WOUND THERAPY DME </= 50 SQ CM Bilateral 12/19/2022    Procedure: Right above knee amputation washout; vac change; Left above knee amputation debridement; vac placement;  Surgeon: Tiffanie Chamberlain DO;  [facility];  Service: Vascular   • WOUND DEBRIDEMENT Right 12/16/2022    Procedure: AKA STUMP WASHOUT, Left AKA Staple removal  application of wound vac to Right AKA;   Surgeon: Iftikhar Blanchard MD;  [facility];  Service: Vascular       Current Outpatient Medications:   •  ALPRAZolam (XANAX) 0 25 mg tablet, Take 1 tablet (0 25 mg total) by mouth daily as needed for anxiety (prior to appointments) for up to 14 days, Disp: 14 tablet, Rfl: 0  •  amiodarone 200 mg tablet, Take 1 tablet (200 mg total) by mouth daily, Disp: , Rfl:   •  amLODIPine (NORVASC) 10 mg tablet, TAKE 1 TABLET BY MOUTH EVERY DAY, Disp: 90 tablet, Rfl: 1  •  atorvastatin (LIPITOR) 40 mg tablet, TAKE 1 TABLET BY MOUTH EVERY DAY, Disp: 90 tablet, Rfl: 1  •  buPROPion (WELLBUTRIN XL) 150 mg 24 hr tablet, Take 1 tablet (150 mg total) by mouth daily, Disp: 30 tablet, Rfl: 0  •  clotrimazole-betamethasone (LOTRISONE) 1-0 05 % cream, Apply topically 2 (two) times a day (Patient not taking: Reported on 5/23/2023), Disp: 30 g, Rfl: 0  •  Continuous Blood Gluc  (FreeStyle Noé 14 Day Peck) LITO, Use 1 Units continuous, Disp: 1 each, Rfl: 0  •  Continuous Blood Gluc Sensor (FreeStyle Noé 14 Day Sensor) MISC, Use daily as directed for CGM - Change every 14 days, Disp: 2 each, Rfl: 1  •  gabapentin (NEURONTIN) 100 mg capsule, TAKE 2 CAPSULES BY MOUTH TWICE A DAY, Disp: 360 capsule, Rfl: 0  •  hydrALAZINE (APRESOLINE) 100 MG tablet, Take 1 tablet (100 mg total) by mouth every 8 (eight) hours, Disp: , Rfl: 0  •  hydrochlorothiazide (HYDRODIURIL) 12 5 mg tablet, Take 1 tablet (12 5 mg total) by mouth daily Do not start before February 4, 2023 , Disp: , Rfl: 0  •  insulin glargine (LANTUS) 100 units/mL subcutaneous injection, Inject 20 Units under the skin every morning Do not start before February 4, 2023 , Disp: 10 mL, Rfl: 0  •  Insulin Pen Needle (B-D UF III MINI PEN NEEDLES) 31G X 5 MM MISC, USE WITH INSULIN FOUR TIMES DAILY, Disp: 200 each, Rfl: 3  •  Isavuconazonium Sulfate 186 MG CAPS, Take 2 capsules (372 mg total) by mouth 3 (three) times a day for 2 days, THEN 2 capsules (372 mg total) in the morning  (Patient taking differently: 2 capsules (372 mg total) in the morning ), Disp: , Rfl: 0  •  Lancets 28G MISC, Use 1 each 4 (four) times a day, Disp: 400 each, Rfl: 0  •  lisinopril (ZESTRIL) 40 mg tablet, TAKE 1 TABLET BY MOUTH EVERY DAY, Disp: 90 tablet, Rfl: 1  •  magnesium chloride (MAG64) 64 MG TBEC EC tablet, Take 1 tablet (64 mg total) by mouth daily Do not start before February 4, 2023   (Patient not taking: Reported on 5/23/2023), Disp: , Rfl: 0  •  methocarbamol 1000 MG TABS, Take 1,000 mg by mouth every 8 (eight) hours (Patient not taking: Reported on 5/23/2023), Disp: 30 tablet, Rfl: 0  •  metoprolol tartrate (LOPRESSOR) 50 mg tablet, Take 1 tablet (50 mg total) by mouth every 12 (twelve) hours, Disp: , Rfl: 0  •  Multiple Vitamin (multivitamin) tablet, Take 1 tablet by mouth daily, Disp: , Rfl:   •  QUEtiapine (SEROquel) 25 mg tablet, Take 1 tablet (25 mg total) by mouth daily at bedtime, Disp: 30 tablet, Rfl: 0  •  sertraline (ZOLOFT) 100 mg tablet, TAKE 1 TABLET BY MOUTH EVERY DAY, Disp: 90 tablet, Rfl: 1  •  thiamine (VITAMIN B1) 100 mg tablet, Take 1 tablet (100 mg total) by mouth daily Do not start before February 4, 2023 , Disp: , Rfl: 0  •  Trulicity 9 22 UC/9 7DC SOPN, INJECT 0 5 ML (0 75 MG TOTAL) UNDER THE SKIN ONCE A WEEK TUESDAY (Patient not taking: Reported on 5/23/2023), Disp: 2 mL, Rfl: 1  •  warfarin (COUMADIN) 4 mg tablet, Take 1 tablet (4 mg total) by mouth daily (Patient taking differently: Take 5 mg by mouth daily), Disp: , Rfl:   •  acetaminophen (TYLENOL) 325 mg tablet, Take 650 mg by mouth every 4 (four) hours as needed for mild pain, Disp: , Rfl:   •  acetaminophen (TYLENOL) 650 mg suppository, Insert 650 mg into the rectum every 4 (four) hours as needed for mild pain, Disp: , Rfl:   •  aspirin 81 mg chewable tablet, Chew 81 mg daily, Disp: , Rfl:   •  fluocinonide (LIDEX) 0 05 % ointment, Apply topically 2 (two) times a day (Patient not taking: Reported on 4/14/2023), Disp: 30 g, Rfl: 0  •  Magnesium Cl-Calcium Carbonate (SLOW MAGNESIUM/CALCIUM PO), Take 1 tablet by mouth daily, Disp: , Rfl:   •  pantoprazole (PROTONIX) 40 mg tablet, Take 1 tablet (40 mg total) by mouth daily for 14 days, Disp: 14 tablet, Rfl: 0  •  polyethylene glycol (MIRALAX) 17 g packet, Take 17 g by mouth daily, Disp: , Rfl:   •  potassium chloride (K-DUR,KLOR-CON) 10 mEq tablet, Take 2 tablets (20 mEq total) by mouth 2 (two) times a day for 2 doses (Patient not taking: Reported on 5/23/2023), Disp: 4 tablet, Rfl: 0  Allergies   Allergen Reactions   • Heparin Other (See Comments)     History of HIT     Vitals:    04/20/23 1555   BP: 118/60   BP Location: Left arm   Patient Position: Lying right side   Cuff Size: Standard   Pulse: 76   SpO2: 95%   Weight: 71 2 kg (157 lb)     Weight (last 2 days)     None         Blood pressure 118/60, pulse 76, weight 71 2 kg (157 lb), SpO2 95 %, not currently breastfeeding , Body mass index is 26 95 kg/m²  Labs:  No results displayed because visit has over 200 results  Admission on 01/15/2023, Discharged on 01/15/2023   Component Date Value   • POC Glucose 01/15/2023 101    No results displayed because visit has over 200 results  Imaging: No results found  Review of Systems:  Review of Systems   Constitutional: Negative for diaphoresis, fatigue, fever and unexpected weight change  HENT: Negative  Respiratory: Negative for cough, shortness of breath and wheezing  Cardiovascular: Negative for chest pain, palpitations and leg swelling     Gastrointestinal: Negative for abdominal pain, diarrhea and nausea  Musculoskeletal: Negative for gait problem and myalgias  Skin: Negative for rash  Neurological: Negative for dizziness and numbness  Psychiatric/Behavioral: Negative  Physical Exam:  Physical Exam  Constitutional:       Appearance: She is well-developed  HENT:      Head: Normocephalic and atraumatic  Eyes:      Pupils: Pupils are equal, round, and reactive to light  Neck:      Vascular: No JVD  Cardiovascular:      Rate and Rhythm: Regular rhythm  Pulses: Normal pulses  Carotid pulses are 2+ on the right side and 2+ on the left side  Heart sounds: S1 normal and S2 normal    Pulmonary:      Effort: Pulmonary effort is normal       Breath sounds: Normal breath sounds  No wheezing or rales  Abdominal:      General: Bowel sounds are normal       Palpations: Abdomen is soft  Tenderness: There is no abdominal tenderness  Musculoskeletal:         General: No tenderness  Normal range of motion  Cervical back: Normal range of motion and neck supple  Skin:     General: Skin is warm  Neurological:      Mental Status: She is alert and oriented to person, place, and time  Cranial Nerves: No cranial nerve deficit  Deep Tendon Reflexes: Reflexes are normal and symmetric

## 2023-06-01 ENCOUNTER — NURSING HOME VISIT (OUTPATIENT)
Dept: GERIATRICS | Facility: OTHER | Age: 63
End: 2023-06-01

## 2023-06-01 DIAGNOSIS — I10 PRIMARY HYPERTENSION: ICD-10-CM

## 2023-06-01 DIAGNOSIS — R41.82 ALTERED MENTAL STATUS: ICD-10-CM

## 2023-06-01 DIAGNOSIS — E11.65 TYPE 2 DIABETES MELLITUS WITH HYPERGLYCEMIA, WITH LONG-TERM CURRENT USE OF INSULIN (HCC): ICD-10-CM

## 2023-06-01 DIAGNOSIS — M86.151 OTHER ACUTE OSTEOMYELITIS OF RIGHT FEMUR (HCC): ICD-10-CM

## 2023-06-01 DIAGNOSIS — Z79.4 TYPE 2 DIABETES MELLITUS WITH HYPERGLYCEMIA, WITH LONG-TERM CURRENT USE OF INSULIN (HCC): ICD-10-CM

## 2023-06-01 DIAGNOSIS — Z86.73 HISTORY OF CVA (CEREBROVASCULAR ACCIDENT): Primary | ICD-10-CM

## 2023-06-01 DIAGNOSIS — I73.9 PAD (PERIPHERAL ARTERY DISEASE) (HCC): Chronic | ICD-10-CM

## 2023-06-01 DIAGNOSIS — A41.9 SEPSIS (HCC): ICD-10-CM

## 2023-06-01 DIAGNOSIS — F41.9 ANXIETY: ICD-10-CM

## 2023-06-01 DIAGNOSIS — R74.01 TRANSAMINITIS: ICD-10-CM

## 2023-06-01 RX ORDER — AMIODARONE HYDROCHLORIDE 100 MG/1
100 TABLET ORAL DAILY
COMMUNITY

## 2023-06-01 NOTE — ASSESSMENT & PLAN NOTE
Stable   SBP range on avg 120-140/60-70s  Continue Metoprolol, Amlodipine, Lisinopril, Hydralazine, HCTZ  Continue CMP every 2 weeks, next CMP due 6/5  Continue every 3 month CBC

## 2023-06-01 NOTE — PROGRESS NOTES
MONTHLY VISIT  [facility]   POS: 32 LT    NAME: Mayra Iniguez  AGE: 65 y o  SEX: female    DATE OF ENCOUNTER: 6/1/2023    ASSESSMENT AND PROBLEMS:  1  History of CVA (cerebrovascular accident)  Assessment & Plan:  Hx of left MCA   Now on Eliquis  Amiodarone decreased to 100 mg daily per cardiology - due for f/u approx 10/2023  Contiue with BP goal < 130/80 - amlodipine, hydralazine, HCTZ, lisinopril, metoprolol  Continue LDL goal < 70 - atorvastatin  F/u neurology, next apt in August  Pending CT head      2  Type 2 diabetes mellitus with hyperglycemia, with long-term current use of insulin (HCC)  Assessment & Plan:  Stable   Blood sugars trends reviewed, stable range on avg 90-  808U   Continue Trulicity and Lantus  Continue to monitor, avoid hypoglycemia  Continue CCD diet   Routine Q 3 month CBC, BMP, HA1C      3  Primary hypertension  Assessment & Plan:  Stable   SBP range on avg 120-140/60-70s  Continue Metoprolol, Amlodipine, Lisinopril, Hydralazine, HCTZ  Continue CMP every 2 weeks, next CMP due 6/5  Continue every 3 month CBC        4  Altered mental status    5  Sepsis ([facility] [address] )    6  PAD (peripheral artery disease) ([facility] [address] )  Assessment & Plan:  Stable   S/p left AKA 11/23/22  S/p right AKA stump reivison 1/27/23  Plavix dc'd in March per vascular  Continue Aspirin, Statin, Gabapentin  Warfarin recently changed to Eliquis  Continue to assist with ADLs, ongoing supportive care, remains at risk for falls, continue fall precautions       7  Anxiety  Assessment & Plan:  Stable  Continue sertraline, bupropion, seroquel   Followed by psychiatry, last seen 5/22 with recommendations to continue current regimen  Continue supportive care      8  Transaminitis  Assessment & Plan:  Continue to monitor while on Cresemba  Continue CMP Q2 weeks      9   Other acute osteomyelitis of right femur [facility])  Assessment & Plan:  R AKA stump OM and abscess complicated by polymicrobial bacteremia  Continue cresemba 372 mg daily through 7/27 to complete a total of 6 months  Cotinue CMP Q2 weeks - LFTs stable  Continue ID f/u, next apt in July               CHIEF COMPLAINT:  Monthly Visit    HISTORY OF PRESENT ILLNESS:  History taken from patient, staff, chart    Mayra Iniguez is a 66 y/o female LTC resident of [facility] with PMH including but not limited to DM2, PAD, depression, anxiety, CVA, transaminitis, cardiac arrest, bilateral AKA, seen today for monthly follow up  Continues with ongoing supportive care at [address] [place] [address]  Continues on Cresemba for history of right AKA stump osteomyelitis, LFTs remain stable, continues on every 2 week CMP  She denies any pain  Reports overall stable mood, continues on bupropion, sertraline, quetiapine  Reports she is eating, drinking, sleeping without issue  [facility] chart reviewed, appetite fair to good, eating anywhere from 50 to 100% of meals  Last BM on 6/1, she is requesting stool softener  Continues on Lantus and Trulicity, blood sugars remained stable  She does have some weight gain this past month  She continues on multi regimen antihypertensives, blood pressure stable  REVIEW OF SYSTEMS:  Complete ROS negative other than as stated in HPI    HISTORY:  Medical Hx: Reviewed, unchanged  Family Hx: Reviewed, unchanged  Soc Hx: Reviewed, unchanged  Allergies   Allergen Reactions   • Heparin Other (See Comments)     History of HIT       PHYSICAL EXAM:  Vital Signs: /69, temp 97 2, HR 73, RR 18, O2 98% on room air  Weight 166 pounds      General: NAD, lying in bed  Eye Exam: anicteric sclera, no discharge, left decreased vision  ENT: moist mucous membranes  Cardiovascular: regular rate, regular rhythm, no murmurs, rubs, or gallops  Pulmonary: no wheeze, no rhonchi CTA, on RA  Abdominal: soft, nontender, nondistended, bowel sounds audible  Neurological: Awake, alert, oriented, mild tremor of hands, no overt unilateral weakness     Skin: No significant lesions appreciated, no significant rashes appreciated  Healed incisions of bilateral AKA  Psych: Euthymic, cooperative    PERTINENT LABS/IMAGING DATA:    5/30 CMP: Glucose 90, BUN 35, creatinine 1 60, sodium 141, potassium 4 5, alk phos 145, albumin 3 4, EGFR 36, AST 33, ALT 34    5/15 CBC: Hemoglobin 9 9, WBC 6 4, platelet 320    5/1 CMP: Glucose 109, BUN 27, creatinine 1 34, sodium 144, potassium 4 7, alk phos 202, AST 50, ALT 70, EGFR 45           CURRENT MEDICATIONS:  All medications reviewed and updated in Nursing Home EMR      Provider: Caitlin JOHNSON  Patient: Fany Bacon   6/1/2023

## 2023-06-01 NOTE — ASSESSMENT & PLAN NOTE
Stable   Blood sugars trends reviewed, stable range on avg 90-  375P   Continue Trulicity and Lantus  Continue to monitor, avoid hypoglycemia  Continue CCD diet   Routine Q 3 month CBC, BMP, HA1C

## 2023-06-02 NOTE — ASSESSMENT & PLAN NOTE
Hx of left MCA   Now on Eliquis  Amiodarone decreased to 100 mg daily per cardiology - due for f/u approx 10/2023  Contiue with BP goal < 130/80 - amlodipine, hydralazine, HCTZ, lisinopril, metoprolol  Continue LDL goal < 70 - atorvastatin  F/u neurology, next apt in August  Pending CT head

## 2023-06-02 NOTE — ASSESSMENT & PLAN NOTE
R AKA stump OM and abscess complicated by polymicrobial bacteremia  Continue cresemba 372 mg daily through 7/27 to complete a total of 6 months  Cotinue CMP Q2 weeks - LFTs stable  Continue ID f/u, next apt in July

## 2023-06-02 NOTE — ASSESSMENT & PLAN NOTE
Stable   S/p left AKA 11/23/22  S/p right AKA stump reivison 1/27/23  Plavix dc'd in March per vascular  Continue Aspirin, Statin, Gabapentin  Warfarin recently changed to Eliquis  Continue to assist with ADLs, ongoing supportive care, remains at risk for falls, continue fall precautions

## 2023-06-02 NOTE — ASSESSMENT & PLAN NOTE
Stable  Continue sertraline, bupropion, seroquel   Followed by psychiatry, last seen 5/22 with recommendations to continue current regimen  Continue supportive care

## 2023-06-05 LAB
EXTERNAL CREATININE: 1.3
EXTERNAL EGFR: 10

## 2023-06-26 ENCOUNTER — NURSING HOME VISIT (OUTPATIENT)
Dept: GERIATRICS | Facility: OTHER | Age: 63
End: 2023-06-26

## 2023-06-26 DIAGNOSIS — Z79.4 TYPE 2 DIABETES MELLITUS WITH RIGHT EYE AFFECTED BY MODERATE NONPROLIFERATIVE RETINOPATHY WITHOUT MACULAR EDEMA, WITH LONG-TERM CURRENT USE OF INSULIN (HCC): Chronic | ICD-10-CM

## 2023-06-26 DIAGNOSIS — I10 PRIMARY HYPERTENSION: ICD-10-CM

## 2023-06-26 DIAGNOSIS — M86.151 OTHER ACUTE OSTEOMYELITIS OF RIGHT FEMUR (HCC): Primary | ICD-10-CM

## 2023-06-26 DIAGNOSIS — E11.3391 TYPE 2 DIABETES MELLITUS WITH RIGHT EYE AFFECTED BY MODERATE NONPROLIFERATIVE RETINOPATHY WITHOUT MACULAR EDEMA, WITH LONG-TERM CURRENT USE OF INSULIN (HCC): Chronic | ICD-10-CM

## 2023-06-26 DIAGNOSIS — Z86.73 HISTORY OF CVA (CEREBROVASCULAR ACCIDENT): ICD-10-CM

## 2023-06-27 NOTE — PROGRESS NOTES
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, with long-term current use of insulin ([facility] )        3  Primary hypertension        4  History of CVA (cerebrovascular accident)           Osteomyelitis (HCC)  Pt s/p Rt AKA stump osteomyelitis & abscess  Pt on 6 month course cresemba 372 mg until 7/27  Cont cmp checks q2 weeks & care per I D        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 ([facility])  Stable sugars  Lab Results   Component Value Date    HGBA1C 9 8 (H) 10/25/2022     113 0 mg/dL 6/30/2023 05:46     6/23/2023 05:03 89 0 mg/dL     6/16/2023 05:03 130 0 mg/dL     6/11/2023 11:20 149 0 mg/dL     6/9/2023 05:08 122 0 mg/dL     6/2/2023 05:06 109 0 mg/dL     5/26/2023 06:03 97 0 mg/dL     Cont lantus 20 units SQ daily  Cont trulicity 2 30 NP/3 7 ml SQ qweekly    Hypertension  Uncontrolled  176 / 73 mmHg 6/11/2023 12:52    6/11/2023 11:20 192 / 79 mmHg    5/17/2023 09:16 122 / 69 mmHg    5/15/2023 10:39 140 / 63 mmHg    5/12/2023 08:57 144 / 70 mmHg    5/10/2023 10:21 136 / 70 mmHg    5/8/2023 09:05 120 / 45 mmHg    Pt needs biweekly BP checks  Cont amlodipine 10 mg 1 tab po daily  Cont lisinopril 40 mg 1 tab po daily  Cont hydralazine 100 mg 1 tab po TID  Cont hctz 12 5 mg 1 tab po daily  Cont metoprolol 50 mg 1 tab po BID      History of CVA (cerebrovascular accident)  Stable  Hx of Lt MCA  Pt agreeable to switch from coumadin to eliquis to avoid frequent fingersticks  Pt also is on amiodarone  Pt needs better BP control & more frequent BP checks; consider adding prn metoprolol for SBP >160 depending on BP trend  Cont statin       Code status: cpr    Chief Complaint     Routine Long term follow-up visit  60 day orders signed    History of Present Illness   Asked by RN to see pt as part of 60 day visit  Pt was sleeping  Easily arousable  The following portions of the patient's history were reviewed and updated as appropriate: allergies, current medications, past family history, past medical history, past social history, past surgical history and problem list     Review of Systems     Review of Systems   Constitutional: Negative for chills and fatigue  All other systems reviewed and are negative        Active Problem List     Patient Active Problem List   Diagnosis   • Esophageal reflux   • DM (diabetes mellitus) type II, controlled, with peripheral vascular disorder ([facility])   • Class 3 severe obesity due to excess calories with serious comorbidity and body mass index (BMI) of 40 0 to 44 9 in adult [facility])   • Depression, recurrent ([facility])   • Anxiety   • Hypertension   • Stroke [facility])   • Diabetic peripheral neuropathy ([facility] [address] )   • Vitamin D deficiency   • Systolic murmur   • Familial combined hyperlipidemia   • Arthritis   • Transaminitis   • Acute colitis   • Moderate protein-calorie malnutrition ([facility])   • 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   • 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 ([facility])   • Type 2 diabetes mellitus with hyperglycemia, with long-term current use of insulin ([facility])   • Hyperparathyroidism ([facility] [address] )   • Wound of lower extremity   • Non healing left heel wound   • Hypertensive urgency   • Hypokalemia   • PAD (peripheral artery disease) ([facility])   • Generalized edema due to fluid overload   • Anemia   • Diabetic ulcer of heel associated with diabetes mellitus due to underlying condition ([facility] )   • HIT (heparin-induced thrombocytopenia) ([facility])   • CVA (cerebral vascular accident) ([facility] )   • Mild protein-calorie malnutrition ([facility] )   • History of CVA (cerebrovascular accident)   • Fall   • Cardiac arrest ([facility] [address] )   • Troponin level elevated   • Hypomagnesemia   • Bacteremia   • Osteomyelitis (HCC)   • Prolonged Q-T interval on ECG   • Thrombocytosis   • 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)       Objective     Vitals: Reviewed in [employer] system  VSS    General: Awake, alert, oriented x 3  Head: atraumatic, normocephalic  Cardiovascular: RRR  Lungs: Clear to auscultation bilaterally  Abdomen: nontender/nondistended, +BS  Legs: no cyanosis, clubbing; pt with B/l AKA; stumps look stable overall; no cellulitis; no drainage; no significant lesion; pt with hx of mild superficial excoriation on Rt lateral stump  Skin: clean, dry, intact  Psych: calm, cooperative    Pertinent Laboratory/Diagnostic Studies:  Refer to facility chart  Current Medications   Medications reviewed and updated in facility chart    Milk of Magnesia Suspension 2400 MG/30ML    Bisac-Evac Suppository 10 MG (Bisacodyl) Insert 1 suppository rectally as needed for Constipation 1 Time a Day, If NO BM by AM Day # 4, Day Nurse Gives a Suppository that AM   Enema Disposable Enema (Sodium Phosphates) Insert 1 application rectally as needed for Constipation 1 Time a Day, If NO BM within 3-5 Hours of Suppository Insertion Give Enema   AMLODIPINE 10MG TABLET GIVE 1 TABLET BY MOUTH ONCE DAILY(Related Diagnoses: ESSENTIAL (PRIMARY) HYPERTENSION (I10))   ATORVASTATIN 40MG TAB GIVE 1 TABLET BY MOUTH ONCE DAILY(Related Diagnoses: OTHER HYPERLIPIDEMIA (E78 49))(Indications for Use: hyperlipidemia)   BUPROPION XL 150MG TABLET GIVE 1 TABLET BY MOUTH ONCE DAILY *DO NOT CRUSH*(Related Diagnoses: ANXIETY DISORDER, UNSPECIFIED (F41 9))   GABAPENTIN 100MG CAPSULE GIVE 2 TABLETS (200MG) BY MOUTH TWICE DAILY(Related Diagnoses: TYPE 2 DIABETES MELLITUS WITH DIABETIC POLYNEUROPATHY (E11 42))(Indications for Use: neuropathy)   LISINOPRIL 40 MG TABLET GIVE 1 TABLET BY MOUTH ONCE DAILY(Related Diagnoses: ESSENTIAL (PRIMARY) HYPERTENSION (I10))   MULTIVITAMIN TABLET GIVE 1 TABLET BY MOUTH ONCE DAILY(Related Diagnoses: VITAMIN DEFICIENCY, UNSPECIFIED (E56 9))(Indications for Use: supplement)   SERTRALINE 100MG TABLET GIVE 1 TABLET BY MOUTH ONCE DAILY AT BEDTIME(Related Diagnoses: MAJOR DEPRESSIVE DISORDER, RECURRENT, UNSPECIFIED (F33 9))(Indications for Use: depression)   TRULICITY 6 05FO/4 4JE INJ PEN INJECT 0 5ML (0 75MG) SQ EVERY WEEK ON TUESDAY *REFRIGERATE UNTIL OPEN, THEN 14 DAYS AT ROOM TEMPERATURE*(Related Diagnoses: TYPE 2 DIABETES MELLITUS WITH DIABETIC POLYNEUROPATHY (E11 42))(Indications for Use: DM)   HYDRALAZINE 100MG TAB GIVE 1 TABLET BY MOUTH EVERY 8 HOURS FOR HYPERTENSION(Related Diagnoses: ESSENTIAL (PRIMARY) HYPERTENSION (I10))(Indications for Use: HTN)   HYDROCHLOROTHIAZIDE 12 5MG TAB GIVE 1 TABLET BY MOUTH ONCE DAILY FOR HYPERTENSION(Related Diagnoses: ESSENTIAL (PRIMARY) HYPERTENSION (I10))(Indications for Use: HTN)   LANTUS SOLOSTAR 100UNITS/ML INJECT 20 UNITS SQ ONCE DAILY FOR DIABETES MELLITUS *REFRIGERATE UNTIL OPEN, THEN 28 DAYS AT ROOM TEMPERATURE*(Related Diagnoses: TYPE 2 DIABETES MELLITUS WITH DIABETIC POLYNEUROPATHY (E11 42))(Indications for Use: DM2)   SLOW MAG 64MG W/CALCIUM EC TAB GIVE 1 TABLET BY MOUTH ONCE DAILY FOR HYPOMAGNESIUM(Related Diagnoses: HYPOMAGNESEMIA (E83 42))(Indications for Use: hypomagnesemia)   VITAMIN B-1 100MG TABLET GIVE 1 TABLET BY MOUTH ONCE DAILY FOR SUPPLEMENT(Related Diagnoses: VITAMIN DEFICIENCY, UNSPECIFIED (E56 9))   QUETIAPINE 25MG TABLET GIVE 1 TABLET BY MOUTH ONCE DAILY AT BEDTIME FOR HALLUCINATIONS(Related Diagnoses: HALLUCINATIONS, UNSPECIFIED (R44 3))   METOPROLOL TART 50MG TABLET GIVE 1 TABLET BY MOUTH EVERY 12 HOURS FOR HYPERTENSION(Related Diagnoses: ESSENTIAL (PRIMARY) HYPERTENSION (I10))   ASPIRIN EC 81MG TABLET GIVE 1 TABLET BY MOUTH ONCE DAILY(Related Diagnoses: PERSONAL HISTORY OF TRANSIENT ISCHEMIC ATTACK (TIA), AND CEREBRAL INFARCTION WITHOUT RESIDUAL DEFICITS (Z86 73))   ALPRAZOLAM 0 25MG TABLET GIVE 1 TABLET BY MOUTH ONCE DAILY AS NEEDED FOR ANXIETY (PRIOR TO APPOINTMENTS) FOR UP TO 14 DAYS   AMIODARONE 100MG TABLET GIVE 1 TABLET BY MOUTH ONCE DAILY(Indications for Use: murmur)   Acetaminophen Tablet 325 MG Give 2 tablet by mouth every 4 hours as needed for Mild Pain Max 3 GM APAP / 24 HR, Give Suppository If Unable to Take By Mouth AND Give 2 tablet by mouth every 4 hours as needed for Temperature = or > 100 degrees Oral / 101 degrees Rectal Max 3 GM APAP / 24 HR, Give Suppository If Unable to Take By Mouth   Acetaminophen Suppository 650 MG Insert 1 suppository rectally every 4 hours as needed for Mild Pain Max 3 GM APAP / 24 HR, Give Suppository If Unable to Take By Mouth AND Insert 1 suppository rectally every 4 hours as needed for Temperature = or > 100 degrees Oral / 101 degrees Rectal Max 3 GM APAP / 24 HR, Give Suppository If Unable to Take By Mouth   CRESEMBA 186MG CAPSULE GIVE 2 CAPSULES (372MG) BY MOUTH EVERY MORNING FOR FUNGAL INFECTION UNTIL 7/27/2023 *DO NOT CRUSH*  (Related Diagnoses: SEPSIS, UNSPECIFIED ORGANISM (A41 9))   ELIQUIS 5MG TABLET GIVE 1 TABLET BY MOUTH TWICE DAILY(Indications for Use: TIA)   DOCUSATE SODIUM 100MG CAPSULE GIVE 1 CAPSULE BY MOUTH ONCE DAILY FOR CONSTIPATION   MiraLax Powder (Polyethylene Glycol 3350) Give 17 gram orally one time a day every other day for constipation   MDRO History of (Define Type of Organism and Site here) No directions specified for order       Zelda oMrel MD  Internal Medicine  Senior Care Physician

## 2023-06-29 ENCOUNTER — TELEPHONE (OUTPATIENT)
Dept: INFECTIOUS DISEASES | Facility: CLINIC | Age: 63
End: 2023-06-29

## 2023-07-02 NOTE — ASSESSMENT & PLAN NOTE
Stable sugars  Lab Results   Component Value Date    HGBA1C 9 8 (H) 10/25/2022     113 0 mg/dL 6/30/2023 05:46     6/23/2023 05:03 89 0 mg/dL     6/16/2023 05:03 130 0 mg/dL     6/11/2023 11:20 149 0 mg/dL     6/9/2023 05:08 122 0 mg/dL     6/2/2023 05:06 109 0 mg/dL     5/26/2023 06:03 97 0 mg/dL     Cont lantus 20 units SQ daily  Cont trulicity 1 87 EV/6 8 ml SQ qweekly

## 2023-07-02 NOTE — ASSESSMENT & PLAN NOTE
Uncontrolled  176 / 73 mmHg 6/11/2023 12:52    6/11/2023 11:20 192 / 79 mmHg    5/17/2023 09:16 122 / 69 mmHg    5/15/2023 10:39 140 / 63 mmHg    5/12/2023 08:57 144 / 70 mmHg    5/10/2023 10:21 136 / 70 mmHg    5/8/2023 09:05 120 / 45 mmHg    Pt needs biweekly BP checks  Cont amlodipine 10 mg 1 tab po daily  Cont lisinopril 40 mg 1 tab po daily  Cont hydralazine 100 mg 1 tab po TID  Cont hctz 12 5 mg 1 tab po daily  Cont metoprolol 50 mg 1 tab po BID

## 2023-07-02 NOTE — ASSESSMENT & PLAN NOTE
Stable  Hx of Lt MCA  Pt agreeable to switch from coumadin to eliquis to avoid frequent fingersticks  Pt also is on amiodarone  Pt needs better BP control & more frequent BP checks; consider adding prn metoprolol for SBP >160 depending on BP trend  Cont statin

## 2023-07-02 NOTE — ASSESSMENT & PLAN NOTE
Pt s/p Rt AKA stump osteomyelitis & abscess  Pt on 6 month course cresemba 372 mg until 7/27  Cont cmp checks q2 weeks & care per I D

## 2023-07-11 ENCOUNTER — TELEPHONE (OUTPATIENT)
Dept: INFECTIOUS DISEASES | Facility: CLINIC | Age: 63
End: 2023-07-11

## 2023-07-11 NOTE — TELEPHONE ENCOUNTER
Luke Corley from [facility] calls the office today regarding appointment. Luke Corley states she is calling to see if patient visit can be virtual on 7/12. Spoke with Maite Tellez [facility] [address] in the office. Per Maite Tellez [facility] [address] patient has not been seen in office since 2/23 and patient needs to be in person at this will be her last visit. Informed Yadira patients appointment needs to be in person. Confirmed appointment with Luke Corley at this time. Also reminded Luke Corley patient is due for her biweekly labs. Luke Corley spoke with staff on patients unit who states they will draw lab.

## 2023-07-12 ENCOUNTER — OFFICE VISIT (OUTPATIENT)
Dept: INFECTIOUS DISEASES | Facility: CLINIC | Age: 63
End: 2023-07-12
Payer: COMMERCIAL

## 2023-07-12 VITALS
RESPIRATION RATE: 18 BRPM | TEMPERATURE: 97.7 F | OXYGEN SATURATION: 96 % | WEIGHT: 156.97 LBS | SYSTOLIC BLOOD PRESSURE: 118 MMHG | BODY MASS INDEX: 26.8 KG/M2 | HEIGHT: 64 IN | HEART RATE: 67 BPM | DIASTOLIC BLOOD PRESSURE: 75 MMHG

## 2023-07-12 DIAGNOSIS — R94.31 PROLONGED Q-T INTERVAL ON ECG: ICD-10-CM

## 2023-07-12 DIAGNOSIS — M86.151 OTHER ACUTE OSTEOMYELITIS OF RIGHT FEMUR (HCC): Primary | ICD-10-CM

## 2023-07-12 DIAGNOSIS — R74.01 TRANSAMINITIS: ICD-10-CM

## 2023-07-12 DIAGNOSIS — R78.81 BACTEREMIA: ICD-10-CM

## 2023-07-12 PROCEDURE — 99213 OFFICE O/P EST LOW 20 MIN: CPT | Performed by: PHYSICIAN ASSISTANT

## 2023-07-12 RX ORDER — DOCUSATE SODIUM 100 MG/1
CAPSULE, LIQUID FILLED ORAL
COMMUNITY
Start: 2023-06-05

## 2023-07-12 NOTE — TELEPHONE ENCOUNTER
Called [place] and spoke to [facility]. [facility] patients labs were drawn yesterday. [facility] they were not processed at [facility] and they were drawn again this morning and sent to [facility].

## 2023-07-12 NOTE — ASSESSMENT & PLAN NOTE
R AKA stump OM and abscess complicated by polymicrobial bacteremia.  Patient initially found to have positive blood cultures with Enterobacter and Enterococcus faecalis.  Work up revealed likely source to be R AKA stump based on prior wound cultures.  CT imaging of the R stump concerning for osteomyelitis at the femoral stump.  She was taken to the OR 1/27 for debridement.  OR cultures with Enterobacter, Enterococcus and candida albicans.  2D echo done and limited, LEON without valvular vegetation.  Patient's antifungal options limited due to prolonged QTC.  She completed 2 weeks of IV ampicillin for enterococcal bacteremia and was then switched to po augmentin for bone coverage, 7 days of meropenem for enterobacter bacteremia then switched to po doxycyline for bone coverage and micafungin that was then changed to cresemba for bone coverage.  Patient has now completed 6 weeks of both doxycycline and augmentin for bacterial OM.  She remains on po cresemba to cover the fungal portion of OM.      Patient continues tolerate the cresemba. Labs stable.   Stump well healed.        Plan  - continue cresemba 372 mg daily as ordered through 7/27/2023 to complete a total of 6 months   - continue CMP every 2 weeks   -no further ID follow up scheduled at this time

## 2023-07-12 NOTE — ASSESSMENT & PLAN NOTE
Enterobacter and Enterococcus faecalis bacteremia secondary to R AKA stump infection.  LEON/TTE without vegetation.  Completed 7 days of meropenem and 2 weeks of Ampicillin.  No further treatment needed.

## 2023-07-12 NOTE — PROGRESS NOTES
Assessment/Plan:    Osteomyelitis ([address])  R AKA stump OM and abscess complicated by polymicrobial bacteremia.  Patient initially found to have positive blood cultures with Enterobacter and Enterococcus faecalis.  Work up revealed likely source to be R AKA stump based on prior wound cultures.  CT imaging of the R stump concerning for osteomyelitis at the femoral stump.  She was taken to the OR 1/27 for debridement.  OR cultures with Enterobacter, Enterococcus and candida albicans.  2D echo done and limited, LEON without valvular vegetation.  Patient's antifungal options limited due to prolonged QTC.  She completed 2 weeks of IV ampicillin for enterococcal bacteremia and was then switched to po augmentin for bone coverage, 7 days of meropenem for enterobacter bacteremia then switched to po doxycyline for bone coverage and micafungin that was then changed to cresemba for bone coverage.  Patient has now completed 6 weeks of both doxycycline and augmentin for bacterial OM.  She remains on po cresemba to cover the fungal portion of OM.      Patient continues tolerate the cresemba. Labs stable. Stump well healed.        Plan  - continue cresemba 372 mg daily as ordered through 7/27/2023 to complete a total of 6 months   - continue CMP every 2 weeks   -no further ID follow up scheduled at this time    Transaminitis  Resolved. Continue to closely monitor while on Cresemba. Bacteremia  Enterobacter and Enterococcus faecalis bacteremia secondary to R AKA stump infection.  LEON/TTE without vegetation.  Completed 7 days of meropenem and 2 weeks of Ampicillin.  No further treatment needed.      Prolonged Q-T interval on ECG  Noted to have prolonged QTC interval, therefore fluconazole is not a treatment option       Diagnoses and all orders for this visit:    Other acute osteomyelitis of right femur (HCC)    Transaminitis    Bacteremia    Prolonged Q-T interval on ECG    Other orders  -     docusate sodium (COLACE) 100 mg capsule          Subjective:      Patient ID: Mindi Hancock is a 65 y.o. female. Mindi Hancock is a 65year old female presenting to the office for a 6 week follow up today regarding R AKA stump OM and abscess complicated by polymicrobial bacteremia. Patient completed a 6 week course of doxycycline and augmentin for bacterial OM and remains on po cresemba to complete a 6 month course on 7/27/2023. She continues to tolerate the cresemba well. She has no new complaints. The following portions of the patient's history were reviewed and updated as appropriate: allergies, current medications, past family history, past medical history, past social history, past surgical history and problem list.    Review of Systems   Constitutional: Negative for chills and fever. HENT: Negative for mouth sores. Respiratory: Negative for shortness of breath. Cardiovascular: Negative for chest pain. Gastrointestinal: Negative for abdominal pain, diarrhea, nausea and vomiting. Musculoskeletal:        Negative for pain in stumps. Objective:      /75   Pulse 67   Temp 97.7 °F (36.5 °C)   Resp 18   Ht 5' 4" (1.626 m)   Wt 71.2 kg (156 lb 15.5 oz)   SpO2 96%   BMI 26.94 kg/m²          Physical Exam  Vitals reviewed. Constitutional:       General: She is not in acute distress. Appearance: Normal appearance. She is not ill-appearing or toxic-appearing. HENT:      Head: Normocephalic and atraumatic. Right Ear: External ear normal.      Left Ear: External ear normal.      Nose: Nose normal.      Mouth/Throat:      Mouth: Mucous membranes are moist.      Pharynx: Oropharynx is clear. No oropharyngeal exudate or posterior oropharyngeal erythema. Eyes:      General: No scleral icterus. Right eye: No discharge. Left eye: No discharge. Extraocular Movements: Extraocular movements intact.       Conjunctiva/sclera: Conjunctivae normal.      Pupils: Pupils are equal, round, and reactive to light. Cardiovascular:      Rate and Rhythm: Normal rate and regular rhythm. Heart sounds: No murmur heard. No friction rub. No gallop. Pulmonary:      Effort: Pulmonary effort is normal. No respiratory distress. Breath sounds: Normal breath sounds. No stridor. No wheezing or rales. Abdominal:      General: Bowel sounds are normal. There is no distension. Palpations: Abdomen is soft. Tenderness: There is no abdominal tenderness. Musculoskeletal:      Comments: Bilateral stumps, incisions are healed and area is pink and dry. Neurological:      General: No focal deficit present. Mental Status: She is alert and oriented to person, place, and time.    Psychiatric:         Mood and Affect: Mood normal.         Behavior: Behavior normal.         Labs  6/27/2023  AST: 17  ALT: 20  AlkPhos: 103

## 2023-07-13 ENCOUNTER — TELEPHONE (OUTPATIENT)
Dept: INFECTIOUS DISEASES | Facility: CLINIC | Age: 63
End: 2023-07-13

## 2023-07-13 NOTE — TELEPHONE ENCOUNTER
Kaylin Haskins from [facility] calls this office today to report that per our note we indicated final date of cresemba to be 7/27. The remaining quantity in the SNF will take them to 7/25. OK per Dr. Hallie Doyle to DC after final dose 7/25. Kaylin Haskins does not need us to fax anything in writing. ..  She will take a verbal.

## 2023-07-20 ENCOUNTER — TELEPHONE (OUTPATIENT)
Dept: NEUROLOGY | Facility: CLINIC | Age: 63
End: 2023-07-20

## 2023-07-20 NOTE — TELEPHONE ENCOUNTER
Lvm informing patient that her appointment with Dr. Natalia Soriano needs to be rescheduled due to provider not being in office.

## 2023-07-21 NOTE — TELEPHONE ENCOUNTER
2nd attempt, informing patient Dr. Medina Merlos will not be in office the date of appointment and it needed to be rescheduled. Provided patient with new appointment date and time on vm, notified patient that an appointment card would be sent in the mail as well. Left call back number if any questions or concerns.

## 2023-08-08 ENCOUNTER — DOCUMENTATION (OUTPATIENT)
Dept: GERIATRICS | Facility: OTHER | Age: 63
End: 2023-08-08

## 2023-08-08 NOTE — PROGRESS NOTES
Pt with hx of B/l aka; Dx code: 84.17  Pt needs BLE prosthesis. Pt has a highback recliner. Pt works with occupational therapy. She is able to use broda wheelchair with back pad, mag wheel, head pillow, R lateral wing and cushion and varilte reflex cushion with G upright alignment. Pt able to use upper body for 4 hours+ and wants to be able to use lower body. Please have  provide BLE stump prosthesis. Thanks.   Xiomara Thompson MD  Internal Medicine  SNF physician  Shad Aguilar

## 2023-08-22 ENCOUNTER — NURSING HOME VISIT (OUTPATIENT)
Dept: GERIATRICS | Facility: OTHER | Age: 63
End: 2023-08-22
Payer: COMMERCIAL

## 2023-08-22 DIAGNOSIS — R63.5 WEIGHT GAIN: Primary | ICD-10-CM

## 2023-08-22 DIAGNOSIS — F41.9 ANXIETY: ICD-10-CM

## 2023-08-22 DIAGNOSIS — I63.9 CEREBROVASCULAR ACCIDENT (CVA), UNSPECIFIED MECHANISM (HCC): ICD-10-CM

## 2023-08-22 DIAGNOSIS — I10 PRIMARY HYPERTENSION: ICD-10-CM

## 2023-08-22 PROCEDURE — 99309 SBSQ NF CARE MODERATE MDM 30: CPT | Performed by: INTERNAL MEDICINE

## 2023-08-22 NOTE — ASSESSMENT & PLAN NOTE
174.0 Lbs 8/1/2023 11:31     7/2/2023 14:43 164.0 Lbs     Pt has gained ten pounds in the last month  Discussed no more lays potato chips (noted big box at bedside)  Reviewed importance of cardiac diet  Pt going for prosthesis fitting next month so important not to gain too much weight.   Pt voiced understanding

## 2023-08-22 NOTE — ASSESSMENT & PLAN NOTE
Stable  Cont bupropion  mg 1 tab po daily  Cont sertraline 100 mg 1 tab po daily QHS  Cont quetiapine 25 mg 1 tab po QHS  Cont alprazolam 0.25 mg daily prn anxiety (prior to appts)

## 2023-08-22 NOTE — PROGRESS NOTES
[address]  [phone] [id number] [facility]  POS 32      NAME: Karen Patricia  AGE: 65 y.o. SEX: female [phone]    DATE OF ENCOUNTER: 8/22/2023    Assessment and Plan     1. Weight gain        2. Primary hypertension        3. Cerebrovascular accident (CVA), unspecified mechanism ([address])        4. Anxiety           Weight gain  174.0 Lbs 8/1/2023 11:31     7/2/2023 14:43 164.0 Lbs     Pt has gained ten pounds in the last month  Discussed no more lays potato chips (noted big box at bedside)  Reviewed importance of cardiac diet  Pt going for prosthesis fitting next month so important not to gain too much weight. Pt voiced understanding    Hypertension  7/14/2023 19:53 136 / 76 mmHg   Stable  Check more recent BP  Cont amlodipine 10 mg 1 tab po daily  Cont lisinopril 40 mg 1 tab po daily  Cont metoprolol tartrate 50 mg 1 tab po BID  Cont hydralazine 100 mg 1 tab po TID  Cont HCTZ 12.5 mg 1 tab po daily  Check regular BMP    CVA (cerebral vascular accident) ([address])  Pt had cva last year 11/14/2022  Pt eating potato chips  Encouraged cardiac diet  Pt A&Ox3  Cont atorvastatin 40 mg 1 tab po daily  Cont asa 81 mg 1 tab po daily for hx PVD  Pt on eliquis 5 mg 1 tab po BID  Pt has been off plavix since 03/2023 after vascular visit  See my [facility] med list in my previous notes  7/20/2023 H/H stable at 10.7/31.8      Anxiety  Stable  Cont bupropion  mg 1 tab po daily  Cont sertraline 100 mg 1 tab po daily QHS  Cont quetiapine 25 mg 1 tab po QHS  Cont alprazolam 0.25 mg daily prn anxiety (prior to appts)       Code status: full    Chief Complaint     Long term follow-up visit. 60 day orders signed    History of Present Illness   Pt seen and examined as part of 60 day orders. Pt has gained ten pounds in the last one month. Overall 20 pounds since January. Noted pt has a big empty box of Lays Potato Chips at bedside. Discouraged intake of potato chips.  Pt wants to get set up with prosthesis next month. I encourage her motivation. The following portions of the patient's history were reviewed and updated as appropriate: allergies, current medications, past family history, past medical history, past social history, past surgical history and problem list.    Review of Systems     Review of Systems   Constitutional: Negative for chills and fever. Gastrointestinal: Positive for constipation. Negative for nausea and vomiting. All other systems reviewed and are negative.     Pt has BM every 2 days    Active Problem List     Patient Active Problem List   Diagnosis   • Esophageal reflux   • DM (diabetes mellitus) type II, controlled, with peripheral vascular disorder ([facility])   • Class 3 severe obesity due to excess calories with serious comorbidity and body mass index (BMI) of 40.0 to 44.9 in adult [facility])   • Depression, recurrent ([facility])   • Anxiety   • Hypertension   • Stroke [facility])   • Diabetic peripheral neuropathy ([address])   • Vitamin D deficiency   • Systolic murmur   • Familial combined hyperlipidemia   • Arthritis   • Transaminitis   • Acute colitis   • Moderate protein-calorie malnutrition ([facility])   • 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   • 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 ([facility])   • Type 2 diabetes mellitus with hyperglycemia, with long-term current use of insulin ([facility])   • Hyperparathyroidism ([address])   • Wound of lower extremity   • Non healing left heel wound   • Hypertensive urgency   • Hypokalemia   • PAD (peripheral artery disease) ([facility])   • Generalized edema due to fluid overload   • Anemia   • Diabetic ulcer of heel associated with diabetes mellitus due to underlying condition ([address])   • HIT (heparin-induced thrombocytopenia) ([facility])   • CVA (cerebral vascular accident) ([address])   • Mild protein-calorie malnutrition ([address])   • History of CVA (cerebrovascular accident)   • Fall   • Cardiac arrest ([address])   • Troponin level elevated   • Hypomagnesemia   • Bacteremia   • Osteomyelitis ([facility])   • Prolonged Q-T interval on ECG   • Thrombocytosis   • 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 ([address])   • Weight gain       Objective     Vitals: Reviewed in [employer] system. VSS    Weight: 175.0 8/2/2023 12:17        Blood Pressure: 136 /  76 7/14/2023 19:53        Temperature: 98.5 7/3/2023 08:56        Pulse: 68 7/17/2023 10:13        Respirations: 20 6/11/2023 11:20        Blood Sugar: 137.0 8/18/2023 05:39        O2 sats: 97.0 % 6/11/2023 11:20        Height: 42.0 4/16/2023 12:01        Pain Level: 0 8/22/2023 09:20     General: Awake, alert & oriented x 3  Head: atraumatic, normocephalic  Cardiovascular: RRR  Lungs: Clear to auscultation bilaterally  Abdomen: nontender/nondistended, +BS  Stumps: no cyanosis, clubbing or edema; stumps healing well  Skin: clean, dry, intact  Psych: calm, cooperative    Pertinent Laboratory/Diagnostic Studies:  Refer to facility chart. Current Medications   Medications reviewed and updated in facility chart.     Milk of Magnesia Suspension 2400 MG/30ML    Bisac-Evac Suppository 10 MG (Bisacodyl) Insert 1 suppository rectally as needed for Constipation 1 Time a Day, If NO BM by AM Day # 4, Day Nurse Gives a Suppository that AM   Enema Disposable Enema (Sodium Phosphates) Insert 1 application rectally as needed for Constipation 1 Time a Day, If NO BM within 3-5 Hours of Suppository Insertion Give Enema   AMLODIPINE 10MG TABLET GIVE 1 TABLET BY MOUTH ONCE DAILY(Related Diagnoses: ESSENTIAL (PRIMARY) HYPERTENSION (I10))   ATORVASTATIN 40MG TAB GIVE 1 TABLET BY MOUTH ONCE DAILY(Related Diagnoses: OTHER HYPERLIPIDEMIA (E78.49))(Indications for Use: hyperlipidemia)   BUPROPION XL 150MG TABLET GIVE 1 TABLET BY MOUTH ONCE DAILY *DO NOT CRUSH*(Related Diagnoses: ANXIETY DISORDER, UNSPECIFIED (F41.9))   GABAPENTIN 100MG CAPSULE GIVE 2 TABLETS (200MG) BY MOUTH TWICE DAILY(Related Diagnoses: TYPE 2 DIABETES MELLITUS WITH DIABETIC POLYNEUROPATHY (E11.42))(Indications for Use: neuropathy)   LISINOPRIL 40 MG TABLET GIVE 1 TABLET BY MOUTH ONCE DAILY(Related Diagnoses: ESSENTIAL (PRIMARY) HYPERTENSION (I10))   MULTIVITAMIN TABLET GIVE 1 TABLET BY MOUTH ONCE DAILY(Related Diagnoses: VITAMIN DEFICIENCY, UNSPECIFIED (E56.9))(Indications for Use: supplement)   SERTRALINE 100MG TABLET GIVE 1 TABLET BY MOUTH ONCE DAILY AT BEDTIME(Related Diagnoses: MAJOR DEPRESSIVE DISORDER, RECURRENT, UNSPECIFIED (F33.9))(Indications for Use: depression)   TRULICITY 8.11VU/7.0NI INJ PEN INJECT 0.5ML (0.75MG) SQ EVERY WEEK ON TUESDAY *REFRIGERATE UNTIL OPEN, THEN 14 DAYS AT ROOM TEMPERATURE*(Related Diagnoses: TYPE 2 DIABETES MELLITUS WITH DIABETIC POLYNEUROPATHY (E11.42))(Indications for Use: DM)   HYDRALAZINE 100MG TAB GIVE 1 TABLET BY MOUTH EVERY 8 HOURS FOR HYPERTENSION(Related Diagnoses: ESSENTIAL (PRIMARY) HYPERTENSION (I10))(Indications for Use: HTN)   HYDROCHLOROTHIAZIDE 12.5MG TAB GIVE 1 TABLET BY MOUTH ONCE DAILY FOR HYPERTENSION(Related Diagnoses: ESSENTIAL (PRIMARY) HYPERTENSION (I10))(Indications for Use: HTN)   LANTUS SOLOSTAR 100UNITS/ML INJECT 20 UNITS SQ ONCE DAILY FOR DIABETES MELLITUS *REFRIGERATE UNTIL OPEN, THEN 28 DAYS AT ROOM TEMPERATURE*(Related Diagnoses: TYPE 2 DIABETES MELLITUS WITH DIABETIC POLYNEUROPATHY (E11.42))(Indications for Use: DM2)   SLOW MAG 64MG W/CALCIUM EC TAB GIVE 1 TABLET BY MOUTH ONCE DAILY FOR HYPOMAGNESIUM(Related Diagnoses: HYPOMAGNESEMIA (E83.42))(Indications for Use: hypomagnesemia)   VITAMIN B-1 100MG TABLET GIVE 1 TABLET BY MOUTH ONCE DAILY FOR SUPPLEMENT(Related Diagnoses: VITAMIN DEFICIENCY, UNSPECIFIED (E56.9))   QUETIAPINE 25MG TABLET GIVE 1 TABLET BY MOUTH ONCE DAILY AT BEDTIME FOR HALLUCINATIONS(Related Diagnoses: HALLUCINATIONS, UNSPECIFIED (R44.3))   METOPROLOL TART 50MG TABLET GIVE 1 TABLET BY MOUTH EVERY 12 HOURS FOR HYPERTENSION(Related Diagnoses: ESSENTIAL (PRIMARY) HYPERTENSION (I10))   ASPIRIN EC 81MG TABLET GIVE 1 TABLET BY MOUTH ONCE DAILY(Related Diagnoses: PERSONAL HISTORY OF TRANSIENT ISCHEMIC ATTACK (TIA), AND CEREBRAL INFARCTION WITHOUT RESIDUAL DEFICITS (Z86.73))   ALPRAZOLAM 0.25MG TABLET GIVE 1 TABLET BY MOUTH ONCE DAILY AS NEEDED FOR ANXIETY (PRIOR TO APPOINTMENTS) FOR UP TO 14 DAYS   AMIODARONE 100MG TABLET GIVE 1 TABLET BY MOUTH ONCE DAILY(Indications for Use: murmur)   Acetaminophen Tablet 325 MG Give 2 tablet by mouth every 4 hours as needed for Mild Pain Max 3 GM APAP / 24 HR, Give Suppository If Unable to Take By Mouth AND Give 2 tablet by mouth every 4 hours as needed for Temperature = or > 100 degrees Oral / 101 degrees Rectal Max 3 GM APAP / 24 HR, Give Suppository If Unable to Take By Mouth   Acetaminophen Suppository 650 MG Insert 1 suppository rectally every 4 hours as needed for Mild Pain Max 3 GM APAP / 24 HR, Give Suppository If Unable to Take By Mouth AND Insert 1 suppository rectally every 4 hours as needed for Temperature = or > 100 degrees Oral / 101 degrees Rectal Max 3 GM APAP / 24 HR, Give Suppository If Unable to Take By Mouth   ELIQUIS 5MG TABLET GIVE 1 TABLET BY MOUTH TWICE DAILY(Related Diagnoses: PERSONAL HISTORY OF TRANSIENT ISCHEMIC ATTACK (TIA), AND CEREBRAL INFARCTION WITHOUT RESIDUAL DEFICITS (Z86.73))(Indications for Use: TIA)   MiraLax Powder (Polyethylene Glycol 3350) Give 17 gram orally one time a day every other day for constipation   DOCUSATE SODIUM 100MG CAPSULE GIVE 1 CAPSULE BY MOUTH TWICE DAILY(Related Diagnoses: Non-pressure chronic ulcer of unspecified heel and midfoot with unspecified severity (L97.409))         Willi Spears MD  Internal Medicine  Senior Care Physician

## 2023-08-22 NOTE — ASSESSMENT & PLAN NOTE
Pt had cva last year 11/14/2022  Pt eating potato chips  Encouraged cardiac diet  Pt A&Ox3  Cont atorvastatin 40 mg 1 tab po daily  Cont asa 81 mg 1 tab po daily for hx PVD  Pt on eliquis 5 mg 1 tab po BID  Pt has been off plavix since 03/2023 after vascular visit  See my [facility] med list in my previous notes  7/20/2023 H/H stable at 10.7/31.8

## 2023-08-22 NOTE — ASSESSMENT & PLAN NOTE
7/14/2023 19:53 136 / 76 mmHg   Stable  Check more recent BP  Cont amlodipine 10 mg 1 tab po daily  Cont lisinopril 40 mg 1 tab po daily  Cont metoprolol tartrate 50 mg 1 tab po BID  Cont hydralazine 100 mg 1 tab po TID  Cont HCTZ 12.5 mg 1 tab po daily  Check regular BMP

## 2023-08-25 ENCOUNTER — TELEPHONE (OUTPATIENT)
Dept: VASCULAR SURGERY | Facility: CLINIC | Age: 63
End: 2023-08-25

## 2023-08-25 NOTE — TELEPHONE ENCOUNTER
Attempted to contact patient to schedule appointment(s) listed below. Requested patient call [phone] option 3 to schedule appointment(s). Patient's appointment(s) are due now. Dopplers  [] Abdominal Aorta Iliac (AOIL)  [] Carotid (CV)   [] Celiac and/or Mesenteric  [] Endovascular Aortic Repair (EVAR)   [] Hemodialysis Access (HD)   [] Lower Limb Arterial (TARI)  [] Lower Limb Venous (LEV)  [] Lower Limb Venous Duplex with Reflux (LEVDR)  [] Renal Artery  [] Upper Limb Arterial (UEA)    [] Upper Limb Venous (UEV)              [] SHARIF and Waveform analysis     Advanced Imaging   [] CTA head/neck    [] CTA abdomen    [] CTA abdomen & pelvis    [] CT abdomen with/ without contrast  [] CT abdomen with contrast  [] CT abdomen without contrast    [] CT abdomen & pelvis with/ without contrast  [] CT abdomen & pelvis with contrast  [] CT abdomen & pelvis without contrast    Office Visit   [] New patient, patient last seen over 3 years ago  [] Risk factor modification (RFM)   [x] Follow up 6mo  [] Lost to follow up ([facility])    MJQ/ 6mo OV f/u. LM with [facility] to schedule pt for apt.

## 2023-08-29 ENCOUNTER — TELEPHONE (OUTPATIENT)
Dept: NEUROLOGY | Facility: CLINIC | Age: 63
End: 2023-08-29

## 2023-08-29 NOTE — TELEPHONE ENCOUNTER
Spoke with facility staff nurse Mahesh Lara and confirmed upcoming appointment with Dr. Emery Julien .

## 2023-09-21 ENCOUNTER — NURSING HOME VISIT (OUTPATIENT)
Dept: GERIATRICS | Facility: OTHER | Age: 63
End: 2023-09-21
Payer: COMMERCIAL

## 2023-09-21 VITALS
WEIGHT: 176.9 LBS | SYSTOLIC BLOOD PRESSURE: 165 MMHG | BODY MASS INDEX: 30.36 KG/M2 | TEMPERATURE: 98.1 F | HEART RATE: 68 BPM | RESPIRATION RATE: 18 BRPM | DIASTOLIC BLOOD PRESSURE: 69 MMHG | OXYGEN SATURATION: 97 %

## 2023-09-21 DIAGNOSIS — Z89.612 S/P BILATERAL ABOVE KNEE AMPUTATION (HCC): ICD-10-CM

## 2023-09-21 DIAGNOSIS — Z79.4 TYPE 2 DIABETES MELLITUS WITH HYPERGLYCEMIA, WITH LONG-TERM CURRENT USE OF INSULIN (HCC): ICD-10-CM

## 2023-09-21 DIAGNOSIS — Z89.611 S/P BILATERAL ABOVE KNEE AMPUTATION (HCC): ICD-10-CM

## 2023-09-21 DIAGNOSIS — R63.5 WEIGHT GAIN: ICD-10-CM

## 2023-09-21 DIAGNOSIS — I10 PRIMARY HYPERTENSION: Primary | ICD-10-CM

## 2023-09-21 DIAGNOSIS — E11.65 TYPE 2 DIABETES MELLITUS WITH HYPERGLYCEMIA, WITH LONG-TERM CURRENT USE OF INSULIN (HCC): ICD-10-CM

## 2023-09-21 DIAGNOSIS — F33.9 DEPRESSION, RECURRENT (HCC): ICD-10-CM

## 2023-09-21 PROCEDURE — 99309 SBSQ NF CARE MODERATE MDM 30: CPT

## 2023-09-21 NOTE — PROGRESS NOTES
[address], [address], [address]  [phone]    NAME: Madai Lee  AGE: 65 y.o.  SEX: female    Progress Note    Location: [facility]  POS: 28 ([facility])  Code Status: CPR    Chief complaint / Reason for visit:  Follow-up visit    Assessment/Plan:    Type 2 diabetes mellitus with hyperglycemia, with long-term current use of insulin ([address])  · Hemoglobin A1c (07/20/23)  · 5.7  · Resident with better A1c control  Encourage CCD  Fasting blood glucose checks weekly with parameters to call  Avoid hypoglycemia  Hypoglycemia protocol  Continue Lantus subQ insulin 20 units daily  Continue Trulicity 9.2KT (5.84 mg) subQ weekly  Follow up HgbA1c Q3 months July/Oct/Jan/April    Hypertension  • Encourage medication adherence   • Continue weekly blood pressure checks  • Encourage heart healthy diet  • Continue amlodipine 10 mg po daily  • Continue lisinopril 40 mg po daily  • Continue metoprolol tartrate 50 mg po Q12h  • Continue hydralazine 100 mg po Q8h  • Continue hydrochlorothiazide 12.5 mg po daily  • Avoid hypotension  • Weekly blood pressures stable  o 09//69  o 09//66  o 09//54  o 08//45  o 08//58  • Follow up with cardiology, scheduled to be seen on December 13    Depression, recurrent ([address])  · Mood stable at time of assessment  · Resident followed by psychiatry   · Continue buproprion 150 mg po daily  · Continue sertraline 100 mg po daily at bedtime  · Continue quetiapine 25 mg po daily at bedtime  · Continue to monitor mood  · Continue to provide 24/7 supportive care    Weight gain  · Patient's weight is stable from last month  · 09/.9 lbs  · 08/.0 lbs  · Continue to encourage healthy food choices  · Educate patient to avoid excess snacking during the day    S/P bilateral above knee amputation ([address])  · Due to significant PAD and complications after attempted revascularizations resident required bilateral AKA  · Left leg done on 11/23/22  · Right leg done on 12/01/22  · Continue follow up with Vascular  · Continue PT/OT  · Resident with bilateral stump shrinkers on  · Follow up with [employer]) for b/l prosthetics      This is a 65 y.o. female seen today at [facility]. Medical history includes, not limited to, bilateral above knee amputations, cardiac arrest, type 2 DM, TIA, major depressive disorder, peripheral vascular disease, hypertension, hyperlipidemia, and GERD. Resident seen today for a follow up visit. Upon entering patient's room she is resting comfortably in bed. She is alert and oriented x 3, able to answer all questions appropriately. Currently she denies pain. States wearing the bilateral stump shrinkers has been tolerable. Denies difficulty eating despite not wanting to wear her upper and lower dentures. Afrin nasal spray is noted on her bedside table, she states she does not use it often. On exam she displays to signs of congestion or coughing. She denies shortness of breath. States she has been going to the bathroom well. No difficulty sleeping at night. Denies N/V/D. Her only concern during this visit is the amount of pills she takes, she would like to take fewer. Informed her this practitioner would take a close look at her medications and see if any available changes could be made. Labs, vitals and orders verified and reviewed in [facility]. Reviewed resident with nursing staff. Nursing and prior provider notes reviewed on this visit. Discussed visit with PCP and nursing staff/ supervisor. Review of Systems   Constitutional: Positive for activity change. Negative for chills and fever. HENT: Positive for dental problem (upper and lower dentures, does not wear). Negative for ear pain and sore throat. Eyes: Negative for pain and visual disturbance. Eyeglasses   Respiratory: Negative for cough and shortness of breath.     Cardiovascular: Negative for chest pain and palpitations. Gastrointestinal: Negative for abdominal pain and vomiting. Genitourinary: Negative for dysuria and hematuria. Musculoskeletal: Positive for gait problem (history of bilateral AKA). Negative for arthralgias and back pain. Skin: Negative for color change and rash. Neurological: Negative for seizures and syncope. Psychiatric/Behavioral: Negative for behavioral problems. All other systems reviewed and are negative. ALLERGY: Reviewed, unchanged  Allergies   Allergen Reactions   • Heparin Other (See Comments)     History of HIT       HISTORY:  Medical Hx: Reviewed, unchanged  Family Hx: Reviewed, unchanged  Soc Hx: Reviewed,  unchanged      Physical Exam  Vitals reviewed. Constitutional:       General: She is not in acute distress. Appearance: Normal appearance. She is obese. She is not ill-appearing. HENT:      Head: Normocephalic. Right Ear: Tympanic membrane normal.      Left Ear: Tympanic membrane normal.      Nose: Nose normal. No congestion. Mouth/Throat:      Mouth: Mucous membranes are moist.      Pharynx: Oropharynx is clear. Eyes:      General:         Right eye: No discharge. Left eye: No discharge. Extraocular Movements: Extraocular movements intact. Conjunctiva/sclera: Conjunctivae normal.      Pupils: Pupils are equal, round, and reactive to light. Cardiovascular:      Rate and Rhythm: Normal rate and regular rhythm. Pulses: Normal pulses. Heart sounds: Normal heart sounds. Pulmonary:      Effort: Pulmonary effort is normal. No respiratory distress. Breath sounds: Normal breath sounds. Chest:      Chest wall: No tenderness. Abdominal:      General: Bowel sounds are normal.      Palpations: Abdomen is soft. Tenderness: There is no abdominal tenderness. Musculoskeletal:         General: Deformity (bilateral AKA) present. No swelling. Normal range of motion. Cervical back: Normal range of motion. Skin:     General: Skin is warm and dry. Neurological:      Mental Status: She is alert and oriented to person, place, and time. Mental status is at baseline. Motor: No weakness. Psychiatric:         Mood and Affect: Mood normal.         Behavior: Behavior normal.         Thought Content: Thought content normal.         Judgment: Judgment normal.            Laboratory results / Imaging reviewed: Hard copy/ies in medical chart:    Vitals:    09/21/23 1354   BP: 165/69   Pulse: 68   Resp: 18   Temp: 98.1 °F (36.7 °C)   SpO2: 97%       Current Medications: All medications reviewed and updated in Nursing Home Chart    Please note: This note was completed in part utilizing a voice-recognition software may have been used in the preparation of this document. Grammatical errors, random word insertion, spelling mistakes, and incomplete sentences may be an occasional consequence of the system secondary to software limitations, ambient noise and hardware issues. Occasional wrong word or "sound-alike" substitutions may have occurred due to the inherent limitations of voice recognition software. At the time of dictation, efforts were made to edit, clarify and/or correct errors. Interpretation should be guided by context. Please read the chart carefully and recognize, using context, where substitutions have occurred. If you have any questions or concerns about the context, text or information contained within the body of this dictation, please contact myself, the provider, for further clarification.       ALEX Ramon  9/21/2023

## 2023-09-21 NOTE — ASSESSMENT & PLAN NOTE
• Encourage medication adherence   • Continue weekly blood pressure checks  • Encourage heart healthy diet  • Continue amlodipine 10 mg po daily  • Continue lisinopril 40 mg po daily  • Continue metoprolol tartrate 50 mg po Q12h  • Continue hydralazine 100 mg po Q8h  • Continue hydrochlorothiazide 12.5 mg po daily  • Avoid hypotension  • Weekly blood pressures stable  o 09//69  o 09//66  o 09//54  o 08//45  o 08//58  • Follow up with cardiology, scheduled to be seen on December 13

## 2023-09-21 NOTE — ASSESSMENT & PLAN NOTE
· Due to significant PAD and complications after attempted revascularizations resident required bilateral AKA  · Left leg done on 11/23/22  · Right leg done on 12/01/22  · Continue follow up with Vascular  · Continue PT/OT  · Resident with bilateral stump shrinkers on  · Follow up with [facility]) for b/l prosthetics

## 2023-09-21 NOTE — ASSESSMENT & PLAN NOTE
· Patient's weight is stable from last month  · 09/.9 lbs  · 08/.0 lbs  · Continue to encourage healthy food choices  · Educate patient to avoid excess snacking during the day

## 2023-09-21 NOTE — ASSESSMENT & PLAN NOTE
· Hemoglobin A1c (07/20/23)  · 5.7  · Resident with better A1c control  Encourage CCD  Fasting blood glucose checks weekly with parameters to call  Avoid hypoglycemia  Hypoglycemia protocol  Continue Lantus subQ insulin 20 units daily  Continue Trulicity 8.9JU (8.65 mg) subQ weekly  Follow up HgbA1c Q3 months July/Oct/Jan/April

## 2023-10-18 ENCOUNTER — VBI (OUTPATIENT)
Dept: ADMINISTRATIVE | Facility: OTHER | Age: 63
End: 2023-10-18

## 2023-10-19 ENCOUNTER — NURSING HOME VISIT (OUTPATIENT)
Dept: GERIATRICS | Facility: OTHER | Age: 63
End: 2023-10-19
Payer: COMMERCIAL

## 2023-10-19 DIAGNOSIS — E11.65 TYPE 2 DIABETES MELLITUS WITH HYPERGLYCEMIA, WITH LONG-TERM CURRENT USE OF INSULIN (HCC): Primary | ICD-10-CM

## 2023-10-19 DIAGNOSIS — I63.9 CEREBROVASCULAR ACCIDENT (CVA), UNSPECIFIED MECHANISM (HCC): ICD-10-CM

## 2023-10-19 DIAGNOSIS — E78.49 FAMILIAL COMBINED HYPERLIPIDEMIA: ICD-10-CM

## 2023-10-19 DIAGNOSIS — Z79.4 TYPE 2 DIABETES MELLITUS WITH HYPERGLYCEMIA, WITH LONG-TERM CURRENT USE OF INSULIN (HCC): Primary | ICD-10-CM

## 2023-10-19 DIAGNOSIS — I10 PRIMARY HYPERTENSION: ICD-10-CM

## 2023-10-19 PROCEDURE — 99309 SBSQ NF CARE MODERATE MDM 30: CPT | Performed by: INTERNAL MEDICINE

## 2023-10-19 NOTE — ASSESSMENT & PLAN NOTE
10/18/2023 08:29 160 / 74 mmHg   BP stable but on higher side  Dc hctz  Start chlorthalidone 25 mg 1 tab po daily  Cont metoprolol tartrate 50 mg 1 tab po bid  Cont hydralazine 100 mg 1 tab po TID  Cont amlodipine 10 mg 1 tab po daily

## 2023-10-19 NOTE — ASSESSMENT & PLAN NOTE
7/20/2023   HEMOGLOBIN A1c 5.7   Sugars controlled  149.0 mg/dL 10/13/2023 05:29     10/6/2023 05:30 189.0 mg/dL     9/29/2023 05:02 111.0 mg/dL     9/22/2023 05:09 107.0 mg/dL     9/15/2023 05:13 122.0 mg/dL     9/8/2023 05:09 109.0 mg/dL     9/1/2023 05:10 99.0 mg/dL     Cont trulicity 0.5 ml (0.75 mg) SQ qweekly  Cont lantus 20 units SQ daily  Check Hga1c in Jan 2023 as pt's hga1c is controlled

## 2023-10-19 NOTE — PROGRESS NOTES
[facility]  [address] [address]  [phone]  [facility]  POS 32      NAME: Dianna Varghese  AGE: 63 y.o. SEX: female [phone]    DATE OF ENCOUNTER: 10-    Assessment and Plan     1. Type 2 diabetes mellitus with hyperglycemia, with long-term current use of insulin ([facility])        2. Primary hypertension        3. Familial combined hyperlipidemia        4. Cerebrovascular accident (CVA), unspecified mechanism ([facility])           Type 2 diabetes mellitus with hyperglycemia, with long-term current use of insulin ([facility])  7/20/2023   HEMOGLOBIN A1c 5.7   Sugars controlled  149.0 mg/dL 10/13/2023 05:29     10/6/2023 05:30 189.0 mg/dL     9/29/2023 05:02 111.0 mg/dL     9/22/2023 05:09 107.0 mg/dL     9/15/2023 05:13 122.0 mg/dL     9/8/2023 05:09 109.0 mg/dL     9/1/2023 05:10 99.0 mg/dL     Cont trulicity 0.5 ml (0.75 mg) SQ qweekly  Cont lantus 20 units SQ daily  Check Hga1c in Jan 2023 as pt's hga1c is controlled    Hypertension  10/18/2023 08:29 160 / 74 mmHg   BP stable but on higher side  Dc hctz  Start chlorthalidone 25 mg 1 tab po daily  Cont metoprolol tartrate 50 mg 1 tab po bid  Cont hydralazine 100 mg 1 tab po TID  Cont amlodipine 10 mg 1 tab po daily    Familial combined hyperlipidemia  Stable  Cont atorvastatin 40 mg 1 tab po daily    CVA (cerebral vascular accident) (HCC)  Stable  Cont atorvastatin 40 mg 1 tab po QHS  Cont aspirin 81 mg 1 tab po daily  Cont eliquis 5 mg 1 tab po BID       Code status: Full    Chief Complaint     Long term follow-up visit. 60 day orders    History of Present Illness   Asked by nurse to see pt as part of long term visit.    Pt seen and examined. Pt moving around in her wheelchair. Pt denies any pain. Pt appears medically stable at this time. Clear lungs; RRR; abd soft nontender.    The following portions of the patient's history were reviewed and updated as appropriate: allergies, current medications, past family history, past  medical history, past social history, past surgical history and problem list.    Review of Systems     Review of Systems   Musculoskeletal:  Negative for arthralgias.   All other systems reviewed and are negative.      Active Problem List     Patient Active Problem List   Diagnosis    Esophageal reflux    DM (diabetes mellitus) type II, controlled, with peripheral vascular disorder (HCC)    Class 3 severe obesity due to excess calories with serious comorbidity and body mass index (BMI) of 40.0 to 44.9 in adult ([facility])    Depression, recurrent (HCC)    Anxiety    Hypertension    Stroke ([facility])    Diabetic peripheral neuropathy ([facility])    Vitamin D deficiency    Systolic murmur    Familial combined hyperlipidemia    Arthritis    Transaminitis    Acute colitis    Moderate protein-calorie malnutrition ([facility])    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 ([facility])    Type 2 diabetes mellitus with hyperglycemia, with long-term current use of insulin ([facility])    Hyperparathyroidism ([facility])    Wound of lower extremity    Non healing left heel wound    Hypertensive urgency    Hypokalemia    PAD (peripheral artery disease) ([facility])    Generalized edema due to fluid overload    Anemia    Diabetic ulcer of heel associated with diabetes mellitus due to underlying condition ([facility])    HIT (heparin-induced thrombocytopenia) ([facility])    CVA (cerebral vascular accident) ([facility])    Mild protein-calorie malnutrition ([facility])    History of CVA (cerebrovascular accident)    Fall    Cardiac arrest ([facility])    Troponin level elevated    Hypomagnesemia    Bacteremia    Osteomyelitis ([facility])    Prolonged Q-T interval on ECG    Thrombocytosis    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 ([facility])    Weight gain    S/P bilateral above knee amputation ([facility])       Objective     Vitals: Reviewed in [employer] system. VSS    General: Awake, alert & oriented x 3  Head:  atraumatic, normocephalic  Cardiovascular: RRR  Lungs: Clear to auscultation bilaterally  Abdomen: nontender/nondistended, +BS  Skin: clean, dry  Psych: calm, cooperative  (Pt with known stumps & shorts on; pt out in nurses station so deferred exam)    Pertinent Laboratory/Diagnostic Studies:  Refer to facility chart.    CBC:   HEMOGLOBIN 10.7 g/dL 11.5-14.5 L Final              HEMATOCRIT 31.6 % 35.0-43.0 L Final             WBC 9.2 thou/cmm 4.0-10.0  Final             RBC 3.62 mill/cmm 3.70-4.70 L Final             PLATELET COUNT 257           BMP:     GLUCOSE 125 mg/dL 65-99 H Final              BUN 31 mg/dL 7-25 H Final             CREATININE 1.50 mg/dL 0.40-1.10 H Final             SODIUM 141 mmol/L 135-145  Final             POTASSIUM 5.2 mmol/L 3.5-5.2  Final             CHLORIDE 106 mmol/L 100-109  Final             CARBON DIOXIDE 27 mmol/L 21-31  Final             CALCIUM 8.8 mg/dL 8.5-10.1  Final             ALKALINE PHOSPHATASE 107 U/L   Final             ALBUMIN 3.5 g/dL 3.5-5.7  Final             BILIRUBIN,TOTAL 0.2 mg/dL 0.2-1.0  Final     Eltrombopag and its metabolites may interfere with this assay causing   erroneously high patient results.        PROTEIN, TOTAL 6.4 g/dL 6.3-8.3  Final             AST 24 U/L <41  Final             ALT 2           Current Medications   Medications reviewed and updated in facility chart.    Milk of Magnesia Suspension 2400 MG/30ML    Bisac-Evac Suppository 10 MG (Bisacodyl) Insert 1 suppository rectally as needed for Constipation 1 Time a Day, If NO BM by AM Day # 4, Day Nurse Gives a Suppository that AM   Enema Disposable Enema (Sodium Phosphates) Insert 1 application rectally as needed for Constipation 1 Time a Day, If NO BM within 3-5 Hours of Suppository Insertion Give Enema   AMLODIPINE 10MG TABLET GIVE 1 TABLET BY MOUTH ONCE DAILY(Related Diagnoses: ESSENTIAL (PRIMARY) HYPERTENSION (I10))   ATORVASTATIN 40MG TAB GIVE 1 TABLET BY MOUTH ONCE DAILY(Related  Diagnoses: OTHER HYPERLIPIDEMIA (E78.49))(Indications for Use: hyperlipidemia)   BUPROPION XL 150MG TABLET GIVE 1 TABLET BY MOUTH ONCE DAILY *DO NOT CRUSH*(Related Diagnoses: ANXIETY DISORDER, UNSPECIFIED (F41.9))   GABAPENTIN 100MG CAPSULE GIVE 2 TABLETS (200MG) BY MOUTH TWICE DAILY(Related Diagnoses: TYPE 2 DIABETES MELLITUS WITH DIABETIC POLYNEUROPATHY (E11.42))(Indications for Use: neuropathy)   LISINOPRIL 40 MG TABLET GIVE 1 TABLET BY MOUTH ONCE DAILY(Related Diagnoses: ESSENTIAL (PRIMARY) HYPERTENSION (I10))   MULTIVITAMIN TABLET GIVE 1 TABLET BY MOUTH ONCE DAILY(Related Diagnoses: VITAMIN DEFICIENCY, UNSPECIFIED (E56.9))(Indications for Use: supplement)   SERTRALINE 100MG TABLET GIVE 1 TABLET BY MOUTH ONCE DAILY AT BEDTIME(Related Diagnoses: MAJOR DEPRESSIVE DISORDER, RECURRENT, UNSPECIFIED (F33.9))(Indications for Use: depression)   TRULICITY 0.75MG/0.5ML INJ PEN INJECT 0.5ML (0.75MG) SQ EVERY WEEK ON TUESDAY *REFRIGERATE UNTIL OPEN, THEN 14 DAYS AT ROOM TEMPERATURE*(Related Diagnoses: TYPE 2 DIABETES MELLITUS WITH DIABETIC POLYNEUROPATHY (E11.42))(Indications for Use: DM)   HYDRALAZINE 100MG TAB GIVE 1 TABLET BY MOUTH EVERY 8 HOURS FOR HYPERTENSION(Related Diagnoses: ESSENTIAL (PRIMARY) HYPERTENSION (I10))(Indications for Use: HTN)   HYDROCHLOROTHIAZIDE 12.5MG TAB GIVE 1 TABLET BY MOUTH ONCE DAILY FOR HYPERTENSION(Related Diagnoses: ESSENTIAL (PRIMARY) HYPERTENSION (I10))(Indications for Use: HTN)   LANTUS SOLOSTAR 100UNITS/ML INJECT 20 UNITS SQ ONCE DAILY FOR DIABETES MELLITUS *REFRIGERATE UNTIL OPEN, THEN 28 DAYS AT ROOM TEMPERATURE*(Related Diagnoses: TYPE 2 DIABETES MELLITUS WITH DIABETIC POLYNEUROPATHY (E11.42))(Indications for Use: DM2)   SLOW MAG 64MG W/CALCIUM EC TAB GIVE 1 TABLET BY MOUTH ONCE DAILY FOR HYPOMAGNESIUM(Related Diagnoses: HYPOMAGNESEMIA (E83.42))(Indications for Use: hypomagnesemia)   VITAMIN B-1 100MG TABLET GIVE 1 TABLET BY MOUTH ONCE DAILY FOR SUPPLEMENT(Related Diagnoses: VITAMIN  DEFICIENCY, UNSPECIFIED (E56.9))   QUETIAPINE 25MG TABLET GIVE 1 TABLET BY MOUTH ONCE DAILY AT BEDTIME FOR HALLUCINATIONS(Related Diagnoses: HALLUCINATIONS, UNSPECIFIED (R44.3))   METOPROLOL TART 50MG TABLET GIVE 1 TABLET BY MOUTH EVERY 12 HOURS FOR HYPERTENSION(Related Diagnoses: ESSENTIAL (PRIMARY) HYPERTENSION (I10))   ASPIRIN EC 81MG TABLET GIVE 1 TABLET BY MOUTH ONCE DAILY(Related Diagnoses: PERSONAL HISTORY OF TRANSIENT ISCHEMIC ATTACK (TIA), AND CEREBRAL INFARCTION WITHOUT RESIDUAL DEFICITS (Z86.73))   ALPRAZOLAM 0.25MG TABLET GIVE 1 TABLET BY MOUTH ONCE DAILY AS NEEDED FOR ANXIETY (PRIOR TO APPOINTMENTS) FOR UP TO 14 DAYS   AMIODARONE 100MG TABLET GIVE 1 TABLET BY MOUTH ONCE DAILY(Indications for Use: murmur)   Acetaminophen Tablet 325 MG Give 2 tablet by mouth every 4 hours as needed for Mild Pain Max 3 GM APAP / 24 HR, Give Suppository If Unable to Take By Mouth AND Give 2 tablet by mouth every 4 hours as needed for Temperature = or > 100 degrees Oral / 101 degrees Rectal Max 3 GM APAP / 24 HR, Give Suppository If Unable to Take By Mouth   Acetaminophen Suppository 650 MG Insert 1 suppository rectally every 4 hours as needed for Mild Pain Max 3 GM APAP / 24 HR, Give Suppository If Unable to Take By Mouth AND Insert 1 suppository rectally every 4 hours as needed for Temperature = or > 100 degrees Oral / 101 degrees Rectal Max 3 GM APAP / 24 HR, Give Suppository If Unable to Take By Mouth   ELIQUIS 5MG TABLET GIVE 1 TABLET BY MOUTH TWICE DAILY(Related Diagnoses: PERSONAL HISTORY OF TRANSIENT ISCHEMIC ATTACK (TIA), AND CEREBRAL INFARCTION WITHOUT RESIDUAL DEFICITS (Z86.73))(Indications for Use: TIA)   DOCUSATE SODIUM 100MG CAPSULE GIVE 1 CAPSULE BY MOUTH TWICE DAILY(Related Diagnoses: Non-pressure chronic ulcer of unspecified heel and midfoot with unspecified severity (L97.409))   MiraLax Powder (Polyethylene Glycol 3350) Give 17 gram orally one time a day for constipation hold for loose stools       Kaylah  MD Gregory  Internal Medicine  Senior Care Physician

## 2023-10-26 ENCOUNTER — OFFICE VISIT (OUTPATIENT)
Dept: VASCULAR SURGERY | Facility: CLINIC | Age: 63
End: 2023-10-26
Payer: COMMERCIAL

## 2023-10-26 VITALS — SYSTOLIC BLOOD PRESSURE: 100 MMHG | DIASTOLIC BLOOD PRESSURE: 72 MMHG | OXYGEN SATURATION: 95 % | HEART RATE: 73 BPM

## 2023-10-26 DIAGNOSIS — Z89.611 S/P BILATERAL ABOVE KNEE AMPUTATION (HCC): Primary | ICD-10-CM

## 2023-10-26 DIAGNOSIS — Z89.612 S/P BILATERAL ABOVE KNEE AMPUTATION (HCC): Primary | ICD-10-CM

## 2023-10-26 PROCEDURE — 99213 OFFICE O/P EST LOW 20 MIN: CPT | Performed by: SURGERY

## 2023-10-26 NOTE — ASSESSMENT & PLAN NOTE
71-year-old female with bilateral lower extremity above-knee amputations due to significant PAD and a complicated post op course    Patient is doing well and in good spirits. Wounds are well healed. She states she is being fitted for prosthetics and stood up yesterday for the first time. From my standpoint patient can follow with me as needed.  She is doing well and I am happy to see her continuing to improve

## 2023-10-26 NOTE — PROGRESS NOTES
Assessment/Plan:    S/P bilateral above knee amputation [facility])  31-year-old female with bilateral lower extremity above-knee amputations due to significant PAD and a complicated post op course    Patient is doing well and in good spirits. Wounds are well healed. She states she is being fitted for prosthetics and stood up yesterday for the first time. From my standpoint patient can follow with me as needed. She is doing well and I am happy to see her continuing to improve       Diagnoses and all orders for this visit:    S/P bilateral above knee amputation ([address])          Subjective:      Patient ID: Lydia Oliveros is a 59 y.o. female. Patient has history of bilateral BKA and presents for 6 month follow up. She denies any issues with lower extremities. She is taking Eliquis, ASA 81 mg, and Atrovastatin. HPI    The following portions of the patient's history were reviewed and updated as appropriate: allergies, current medications, past family history, past medical history, past social history, past surgical history, and problem list.    I have spent a total time of 20 minutes on 10/26/23 in caring for this patient including Prognosis, Instructions for management, Impressions, Counseling / Coordination of care, Documenting in the medical record, Reviewing / ordering tests, medicine, procedures  , and Obtaining or reviewing history  . Review of Systems   Constitutional: Negative. HENT: Negative. Eyes: Negative. Respiratory: Negative. Cardiovascular: Negative. Gastrointestinal: Negative. Endocrine: Negative. Genitourinary: Negative. Musculoskeletal: Negative. Skin: Negative. Allergic/Immunologic: Negative. Neurological: Negative. Hematological: Negative. Psychiatric/Behavioral: Negative.            Objective:      /72 (BP Location: Left arm, Patient Position: Supine, Cuff Size: Large)   Pulse 73   SpO2 95%          Physical Exam  Constitutional: Appearance: Normal appearance. HENT:      Head: Normocephalic. Mouth/Throat:      Mouth: Mucous membranes are moist.      Pharynx: Oropharynx is clear. Eyes:      Extraocular Movements: Extraocular movements intact. Pupils: Pupils are equal, round, and reactive to light. Cardiovascular:      Rate and Rhythm: Normal rate and regular rhythm. Pulmonary:      Effort: Pulmonary effort is normal.   Abdominal:      General: Abdomen is flat. Tenderness: There is no abdominal tenderness. Musculoskeletal:      Cervical back: Normal range of motion. Skin:     General: Skin is warm and dry. Comments: AKA incisions well healed bilaterally   Neurological:      General: No focal deficit present. Mental Status: She is alert and oriented to person, place, and time.    Psychiatric:         Mood and Affect: Mood normal.         Behavior: Behavior normal.

## 2023-11-04 NOTE — TELEPHONE ENCOUNTER
Upon review of the In Basket request we were able to locate, review, and update the patient chart as requested for eGFR. Any additional questions or concerns should be emailed to the Practice Liaisons via the appropriate education email address, please do not reply via In Basket.     Thank you  Hans Browning

## 2023-11-13 ENCOUNTER — VBI (OUTPATIENT)
Dept: ADMINISTRATIVE | Facility: OTHER | Age: 63
End: 2023-11-13

## 2023-11-18 ENCOUNTER — VBI (OUTPATIENT)
Dept: ADMINISTRATIVE | Facility: OTHER | Age: 63
End: 2023-11-18

## 2023-11-20 ENCOUNTER — NURSING HOME VISIT (OUTPATIENT)
Dept: GERIATRICS | Facility: OTHER | Age: 63
End: 2023-11-20
Payer: COMMERCIAL

## 2023-11-20 DIAGNOSIS — Z79.4 TYPE 2 DIABETES MELLITUS WITH HYPERGLYCEMIA, WITH LONG-TERM CURRENT USE OF INSULIN (HCC): ICD-10-CM

## 2023-11-20 DIAGNOSIS — K21.9 GASTROESOPHAGEAL REFLUX DISEASE WITHOUT ESOPHAGITIS: ICD-10-CM

## 2023-11-20 DIAGNOSIS — Z89.611 S/P BILATERAL ABOVE KNEE AMPUTATION (HCC): ICD-10-CM

## 2023-11-20 DIAGNOSIS — I10 PRIMARY HYPERTENSION: Primary | ICD-10-CM

## 2023-11-20 DIAGNOSIS — E11.65 TYPE 2 DIABETES MELLITUS WITH HYPERGLYCEMIA, WITH LONG-TERM CURRENT USE OF INSULIN (HCC): ICD-10-CM

## 2023-11-20 DIAGNOSIS — F33.9 DEPRESSION, RECURRENT (HCC): ICD-10-CM

## 2023-11-20 DIAGNOSIS — Z89.612 S/P BILATERAL ABOVE KNEE AMPUTATION (HCC): ICD-10-CM

## 2023-11-20 PROCEDURE — 99309 SBSQ NF CARE MODERATE MDM 30: CPT

## 2023-11-26 ENCOUNTER — VBI (OUTPATIENT)
Dept: ADMINISTRATIVE | Facility: OTHER | Age: 63
End: 2023-11-26

## 2023-11-26 VITALS
RESPIRATION RATE: 18 BRPM | HEART RATE: 68 BPM | WEIGHT: 183 LBS | BODY MASS INDEX: 31.41 KG/M2 | DIASTOLIC BLOOD PRESSURE: 79 MMHG | SYSTOLIC BLOOD PRESSURE: 167 MMHG | TEMPERATURE: 97.9 F

## 2023-11-26 RX ORDER — CALCIUM CARBONATE 500 MG/1
2 TABLET, CHEWABLE ORAL EVERY 6 HOURS PRN
COMMUNITY

## 2023-11-26 NOTE — ASSESSMENT & PLAN NOTE
Stable, no complaints at this time  Encourage to eat meals upright, avoiding laying flat for at least 30 minutes following meals  Avoid trigger foods  Stable off PPI

## 2023-11-26 NOTE — ASSESSMENT & PLAN NOTE
Due to significant PAD and complications after attempted revascularizations resident required bilateral AKA  Left leg done on 11/23/22  Right leg done on 12/01/22  Continue follow up with Vascular  Continue PT/OT  Resident with bilateral stump shrinkers on  Follow up with Radha Nogueira) for b/l prosthetics

## 2023-11-26 NOTE — ASSESSMENT & PLAN NOTE
Encourage medication adherence   Continue weekly blood pressure checks  Encourage heart healthy diet  Continue amlodipine 10 mg po daily  Continue lisinopril 40 mg po daily  Continue metoprolol tartrate 50 mg po Q12h  Continue hydralazine 100 mg po Q8h  Continue hydrochlorothiazide 12.5 mg po daily  Avoid hypotension  Weekly blood pressures stable  11//79  11//43  11//62  11//50  10//74  10//74  Consider increasing hydrochlorothiazide to 25 mg po daily  Follow up with cardiology, scheduled to be seen on December 14, 2023 The patient is a 28y Female complaining of abdominal pain.

## 2023-11-26 NOTE — ASSESSMENT & PLAN NOTE
In good spirits on today's exam  Resident followed by psychiatry   Continue buproprion 150 mg po daily  Continue sertraline 100 mg po daily at bedtime  Continue quetiapine 25 mg po daily at bedtime  Continue to monitor mood  Continue to provide 24/7 supportive care

## 2023-11-26 NOTE — ASSESSMENT & PLAN NOTE
Hemoglobin A1c (10/19/23)  6.5  Encourage CCD  Fasting blood glucose checks weekly with parameters to call  Avoid hypoglycemia  Hypoglycemia protocol  Continue Lantus subQ insulin 20 units daily  Continue Trulicity 4.5YL (6.24 mg) subQ weekly  Follow up HgbA1c Q3 months July/Oct/Jan/April yes...

## 2023-11-26 NOTE — PROGRESS NOTES
[facility]  [address], [address], [address]  [phone]    NAME: Lars Stewart  AGE: 59 y.o.  SEX: female    Progress Note    Location: [facility]  POS: 28 ([facility])  Code Status: Full Code    Chief complaint / Reason for visit:  Follow-up visit    Assessment/Plan:    Depression, recurrent ([address])  In good spirits on today's exam  Resident followed by psychiatry   Continue buproprion 150 mg po daily  Continue sertraline 100 mg po daily at bedtime  Continue quetiapine 25 mg po daily at bedtime  Continue to monitor mood  Continue to provide 24/7 supportive care    S/P bilateral above knee amputation ([address])  Due to significant PAD and complications after attempted revascularizations resident required bilateral AKA  Left leg done on 11/23/22  Right leg done on 12/01/22  Continue follow up with Vascular  Continue PT/OT  Resident with bilateral stump shrinkers on  Follow up with Radha Moody) for b/l prosthetics    Hypertension  Encourage medication adherence   Continue weekly blood pressure checks  Encourage heart healthy diet  Continue amlodipine 10 mg po daily  Continue lisinopril 40 mg po daily  Continue metoprolol tartrate 50 mg po Q12h  Continue hydralazine 100 mg po Q8h  Continue hydrochlorothiazide 12.5 mg po daily  Avoid hypotension  Weekly blood pressures stable  11//79  11//43  11//62  11//50  10//74  10//74  Consider increasing hydrochlorothiazide to 25 mg po daily  Follow up with cardiology, scheduled to be seen on December 14, 2023    Type 2 diabetes mellitus with hyperglycemia, with long-term current use of insulin ([facility])  Hemoglobin A1c (10/19/23)  6.5  Encourage CCD  Fasting blood glucose checks weekly with parameters to call  Avoid hypoglycemia  Hypoglycemia protocol  Continue Lantus subQ insulin 20 units daily  Continue Trulicity 8.7AN (7.89 mg) subQ weekly  Follow up HgbA1c Q3 months July/Oct/Jan/April    Esophageal reflux  Stable, no complaints at this time  Encourage to eat meals upright, avoiding laying flat for at least 30 minutes following meals  Avoid trigger foods  Stable off PPI      This is a 59 y.o. female seen today at [facility]. Medical history includes, not limited to, bilateral above knee amputations, cardiac arrest, type 2 DM, TIA, major depressive disorder, peripheral vascular disease, hypertension, hyperlipidemia, and GERD. Resident seen today for a follow up visit. Upon entering patient's room she is resting comfortably in bed. She is alert and oriented x 3, able to answer all questions appropriately. Currently she denies pain. States wearing the bilateral stump shrinkers has been tolerable. She has recently started working with the [facility] to fit her for prosthetics. She states she was able to stand with the prosthetics for the first time and she was very excited. She is continuing to have a positive attitude and working towards her goal of being able to use the prosthetics to move around. States she has been going to the bathroom well, has a bowel movement every day after breakfast.  No difficulty sleeping at night. Denies N/V/D. Labs, vitals and orders verified and reviewed in [facility]. Reviewed resident with nursing staff. Nursing and prior provider notes reviewed on this visit. Discussed visit with PCP and nursing staff/ supervisor. Review of Systems   Constitutional:  Positive for activity change. Negative for appetite change, fatigue, fever and unexpected weight change. HENT:  Negative for congestion. Eyes:  Negative for pain, discharge and visual disturbance. Respiratory:  Negative for cough and shortness of breath. Cardiovascular:  Negative for chest pain and palpitations. Gastrointestinal:  Negative for abdominal pain, constipation and diarrhea. Genitourinary:  Negative for difficulty urinating, dysuria, hematuria and urgency. Musculoskeletal:  Positive for gait problem. Negative for arthralgias. Skin:  Negative for color change. Neurological:  Negative for syncope and weakness. Psychiatric/Behavioral:  Negative for confusion and sleep disturbance. All other systems reviewed and are negative. ALLERGY: Reviewed, unchanged  Allergies   Allergen Reactions    Heparin Other (See Comments)     History of HIT       HISTORY:  Medical Hx: Reviewed, unchanged  Family Hx: Reviewed, unchanged  Soc Hx: Reviewed,  unchanged      Physical Exam  Vitals reviewed. Constitutional:       General: She is not in acute distress. Appearance: Normal appearance. She is not ill-appearing. HENT:      Head: Normocephalic. Right Ear: Tympanic membrane normal.      Left Ear: Tympanic membrane normal.      Nose: Nose normal. No congestion. Mouth/Throat:      Mouth: Mucous membranes are moist.      Pharynx: Oropharynx is clear. Eyes:      General:         Right eye: No discharge. Left eye: No discharge. Extraocular Movements: Extraocular movements intact. Conjunctiva/sclera: Conjunctivae normal.      Pupils: Pupils are equal, round, and reactive to light. Cardiovascular:      Rate and Rhythm: Normal rate and regular rhythm. Pulses: Normal pulses. Heart sounds: Normal heart sounds. Pulmonary:      Effort: Pulmonary effort is normal. No respiratory distress. Breath sounds: Normal breath sounds. Chest:      Chest wall: No tenderness. Abdominal:      General: Bowel sounds are normal.      Palpations: Abdomen is soft. Tenderness: There is no abdominal tenderness. Musculoskeletal:         General: Deformity present. No swelling. Normal range of motion. Cervical back: Normal range of motion. Right lower leg: No edema. Left lower leg: No edema. Comments: Bilateral AKA   Skin:     General: Skin is warm and dry.    Neurological:      Mental Status: She is alert and oriented to person, place, and time. Mental status is at baseline. Motor: No weakness. Psychiatric:         Mood and Affect: Mood normal.         Behavior: Behavior normal.         Thought Content: Thought content normal.         Judgment: Judgment normal.            Laboratory results / Imaging reviewed: Hard copy/ies in medical chart:    Vitals:    11/26/23 1343   BP: 167/79   Pulse: 68   Resp: 18   Temp: 97.9 °F (36.6 °C)       Current Medications: All medications reviewed and updated in Nursing Home Chart    Please note: This note was completed in part utilizing a voice-recognition software may have been used in the preparation of this document. Grammatical errors, random word insertion, spelling mistakes, and incomplete sentences may be an occasional consequence of the system secondary to software limitations, ambient noise and hardware issues. Occasional wrong word or "sound-alike" substitutions may have occurred due to the inherent limitations of voice recognition software. At the time of dictation, efforts were made to edit, clarify and/or correct errors. Interpretation should be guided by context. Please read the chart carefully and recognize, using context, where substitutions have occurred. If you have any questions or concerns about the context, text or information contained within the body of this dictation, please contact myself, the provider, for further clarification.       ALEX Castaneda  11/26/2023

## 2023-12-19 NOTE — ASSESSMENT & PLAN NOTE
Stable  Cont atorvastatin 40 mg 1 tab po QHS  Cont aspirin 81 mg 1 tab po daily  Cont eliquis 5 mg 1 tab po BID

## 2023-12-20 ENCOUNTER — NURSING HOME VISIT (OUTPATIENT)
Dept: GERIATRICS | Facility: OTHER | Age: 63
End: 2023-12-20
Payer: COMMERCIAL

## 2023-12-20 DIAGNOSIS — K21.9 GASTROESOPHAGEAL REFLUX DISEASE WITHOUT ESOPHAGITIS: ICD-10-CM

## 2023-12-20 DIAGNOSIS — I10 PRIMARY HYPERTENSION: ICD-10-CM

## 2023-12-20 DIAGNOSIS — E11.3391 TYPE 2 DIABETES MELLITUS WITH RIGHT EYE AFFECTED BY MODERATE NONPROLIFERATIVE RETINOPATHY WITHOUT MACULAR EDEMA, WITH LONG-TERM CURRENT USE OF INSULIN (HCC): Chronic | ICD-10-CM

## 2023-12-20 DIAGNOSIS — Z86.73 HISTORY OF CVA (CEREBROVASCULAR ACCIDENT): Primary | ICD-10-CM

## 2023-12-20 DIAGNOSIS — S78.119D: ICD-10-CM

## 2023-12-20 DIAGNOSIS — Z79.4 TYPE 2 DIABETES MELLITUS WITH RIGHT EYE AFFECTED BY MODERATE NONPROLIFERATIVE RETINOPATHY WITHOUT MACULAR EDEMA, WITH LONG-TERM CURRENT USE OF INSULIN (HCC): Chronic | ICD-10-CM

## 2023-12-20 DIAGNOSIS — F33.9 DEPRESSION, RECURRENT (HCC): ICD-10-CM

## 2023-12-20 PROCEDURE — 99309 SBSQ NF CARE MODERATE MDM 30: CPT | Performed by: FAMILY MEDICINE

## 2023-12-20 NOTE — PROGRESS NOTES
[facility]  [address]  [place] [address]  Facility: [place] [address]    NAME: Dianna Varghese  AGE: 63 y.o. SEX: female    DATE OF ENCOUNTER: 12/20/2023    Code status:  CPR    Assessment and Plan     1. History of CVA (cerebrovascular accident)  Assessment & Plan:  Continue Statin and aspirin        2. 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, unspecified laterality, subsequent encounter ([facility])  Assessment & Plan:  Bilaterally above-knee amputation  In the setting of severe peripheral vascular disease  Left lower extremity is AKA done 11/23/2022  Right lower extremity AKA done 12/1/20/22  Has followed with vascular-  Will continue to monitor PT OT to improve her ambulation with prosthesis.      3. 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 Gastroesophageal reflux disease without esophagitis  Assessment & Plan:   in the setting of her concurrent use of Eliquis and aspirin  Continue current dose of PPI      5. Depression, recurrent ([facility])  Assessment & Plan:  Mood improved since she has her prosthesis  Followed by mental health  Currently on bupropion, sertraline, Seroquel and alprozolam      6. Primary hypertension  Assessment & Plan:  Blood pressure stable   Currently on amlodipine, lisinopril, metoprolol, hydralazine, hydrochlorothiazide          All medications and routine orders were reviewed and updated as needed.    Plan discussed with: Patient            Chief Complaint     Seen for follow up at Nursing Home    History of Present Illness     Patient seen for follow-up, currently working with PT OT with ambulation with a new  prosthesis.  Denies any issues or complaints  Medical comorbidities significant for bilateral AKA, DM, TIA, major depression, peripheral vascular disease, hypertension, hyperlipidemia and GERD    HISTORY:  Past Medical History:   Diagnosis Date    Altered mental status 1/24/2023    Anxiety     Depression     Diabetes (HCC)     Diabetes mellitus (HCC)     Diarrhea 11/14/2022    Epigastric pain 2/20/2022    Esophageal reflux     28 APR 2015 RESOLVED    Fall 1/17/2023    Forehead laceration 1/23/2023    Hyperlipidemia     Hypertension     Low back pain     sciatica    Pneumonia 2019    Stroke (HCC) 12/2015    small vessel, L frontal corona radiata. Rx asa 81, Lipitor 40, risk modification    Vitamin D deficiency      Past Surgical History:   Procedure Laterality Date    AMPUTATION ABOVE KNEE (AKA) Right 12/1/2022    Procedure: (AKA), PSB  BKA;  Surgeon: Johnathan Britton DO;  [facility]: [facility];  Service: Vascular    ARTERIOGRAM Right 11/7/2022    Procedure: -Right lower extremity angiogram -Right CFA balloon angioplasty with [id number]  [employer]  -Right CFA DCB with 6x40 Bard Lutonix -Right CFA atherectomy with Jetstream and distal embolization protection with 7mm Spider FX -Right SFA/Pop angioplasty with 4x40 Chololate balloon -Right SFA/Pop DCB with 5x150 Bard Lutonix -Right SFA stent with 6x80 [employer] Rosalinda x2 -R    COLONOSCOPY      IR AORTAGRAM WITH RUN-OFF  10/31/2022    IR LOWER EXTREMITY ANGIOGRAM  11/10/2022    IR LOWER EXTREMITY ANGIOGRAM  11/7/2022    IL AMP LEG THRU TIBIA&FIBULA SEC CLOSURE/SCAR REV Right 1/27/2023    Procedure: AMPUTATION REVISION STUMP;  Surgeon: Johnathan Britton DO;  [facility]: [facility];  Service: Vascular    IL AMPUTATION THIGH THROUGH FEMUR ANY LEVEL Left 11/23/2022    Procedure: (AKA);  Surgeon: Silvano Thompson DO;  [facility];  Service: Vascular    IL NEGATIVE PRESSURE WOUND THERAPY DME </= 50 SQ CM Bilateral 12/19/2022    Procedure: Right  above knee amputation washout; vac change; Left above knee amputation debridement; vac placement;  Surgeon: Johnathan Britton DO;  [facility];  Service: Vascular    WOUND DEBRIDEMENT Right 2022    Procedure: AKA STUMP WASHOUT, Left AKA Staple removal. application of wound vac to Right AKA;  Surgeon: Wan Silva MD;  [facility];  Service: Vascular     Family History   Problem Relation Age of Onset    Diabetes Mother     Lung cancer Mother     Cancer Father     Lung cancer Father     Hypertension Brother     Hypertension Family      Social History     Socioeconomic History    Marital status: Single     Spouse name: None    Number of children: None    Years of education: None    Highest education level: None   Occupational History    None   Tobacco Use    Smoking status: Former     Current packs/day: 0.00     Types: Cigarettes     Quit date:      Years since quittin.    Smokeless tobacco: Never   Vaping Use    Vaping status: Never Used   Substance and Sexual Activity    Alcohol use: Never     Comment: OCCASIONAL ALCOHOL USE IN ALL SCRIPTS    Drug use: No    Sexual activity: None   Other Topics Concern    None   Social History Narrative    None     Social Determinants of Health     Financial Resource Strain: Not on file   Food Insecurity: No Food Insecurity (2023)    Hunger Vital Sign     Worried About Running Out of Food in the Last Year: Never true     Ran Out of Food in the Last Year: Never true   Transportation Needs: Not on file   Physical Activity: Not on file   Stress: Not on file   Social Connections: Not on file   Intimate Partner Violence: Not on file   Housing Stability: Low Risk  (2023)    Housing Stability Vital Sign     Unable to Pay for Housing in the Last Year: No     Number of Places Lived in the Last Year: 1     Unstable Housing in the Last Year: No       Allergies:  Allergies   Allergen Reactions    Heparin Other (See Comments)     History of  HIT       Review of Systems     Review of Systems   Constitutional:  Negative for fatigue.   HENT:  Negative for congestion and hearing loss.    Eyes:  Negative for discharge and itching.   Respiratory:  Negative for apnea and chest tightness.    Cardiovascular:  Negative for chest pain.   Gastrointestinal:  Negative for abdominal distention.   Endocrine: Negative for cold intolerance.   Genitourinary:  Negative for difficulty urinating.   Musculoskeletal:  Negative for arthralgias.   Neurological:  Negative for dizziness.   Hematological:  Negative for adenopathy.   Psychiatric/Behavioral:  Negative for agitation and behavioral problems.        Medications and orders     All medications reviewed and updated in snf EMR.      Objective   .  Vitals:    12/20/23 1534   BP: 124/86   Pulse: 76   Temp: 98.2 °F (36.8 °C)   SpO2: 97%     Wall    Physical Exam  Constitutional:       General: She is not in acute distress.     Appearance: Normal appearance.   HENT:      Head: Normocephalic and atraumatic.      Nose: No congestion.      Mouth/Throat:      Mouth: Mucous membranes are moist.      Pharynx: No oropharyngeal exudate or posterior oropharyngeal erythema.   Eyes:      Pupils: Pupils are equal, round, and reactive to light.   Cardiovascular:      Rate and Rhythm: Normal rate and regular rhythm.      Pulses: Normal pulses.      Heart sounds: Normal heart sounds.   Pulmonary:      Breath sounds: Normal breath sounds.   Abdominal:      General: Bowel sounds are normal.      Palpations: Abdomen is soft.   Musculoskeletal:      Comments: Bilateral AKA   Skin:     General: Skin is warm and dry.   Neurological:      Mental Status: She is alert and oriented to person, place, and time.   Psychiatric:         Mood and Affect: Mood normal.         Behavior: Behavior normal.         Pertinent Laboratory/Diagnostic Studies:   The following labs/studies were reviewed please see chart or hospital paperwork for details.      -  "Medication Side Effects: Adverse side effects of medications were reviewed with the patient/guardian today.    Portions of the record may have been created with voice recognition software.  Occasional wrong word or \"sound a like\" substitutions may have occurred due to the inherent limitations of voice recognition software.  Read the chart carefully and recognize, using context, where substitutions have occurred.    Kezia Cerna M.D.         "

## 2023-12-26 VITALS
DIASTOLIC BLOOD PRESSURE: 86 MMHG | OXYGEN SATURATION: 97 % | BODY MASS INDEX: 31.76 KG/M2 | HEART RATE: 76 BPM | TEMPERATURE: 98.2 F | WEIGHT: 185 LBS | SYSTOLIC BLOOD PRESSURE: 124 MMHG

## 2023-12-26 NOTE — ASSESSMENT & PLAN NOTE
S/p Cardiac arrest 2/2 to electrolyte abnormality  Since has been on Amiodarone and Eliquis  Would recommend  follow up with Cardiology

## 2023-12-26 NOTE — ASSESSMENT & PLAN NOTE
Blood pressure stable   Currently on amlodipine, lisinopril, metoprolol, hydralazine, hydrochlorothiazide

## 2023-12-26 NOTE — ASSESSMENT & PLAN NOTE
Last hemoglobin A1c done was 6.5  But patient has anemia-hemoglobin 10.3  And A1c might be higher than 6.5  Blood sugar looks fairly controlled  Encourage healthy diet  Continue Trulicity and Lantus

## 2023-12-26 NOTE — ASSESSMENT & PLAN NOTE
Bilaterally above-knee amputation  In the setting of severe peripheral vascular disease  Left lower extremity is AKA done 11/23/2022  Right lower extremity AKA done 12/1/20/22  Has followed with vascular-  Will continue to monitor PT OT to improve her ambulation with prosthesis.

## 2023-12-26 NOTE — ASSESSMENT & PLAN NOTE
Mood improved since she has her prosthesis  Followed by mental health  Currently on bupropion, sertraline, Seroquel and alprozolam

## 2024-01-18 ENCOUNTER — NURSING HOME VISIT (OUTPATIENT)
Dept: GERIATRICS | Facility: OTHER | Age: 64
End: 2024-01-18
Payer: COMMERCIAL

## 2024-01-18 DIAGNOSIS — Z79.4 TYPE 2 DIABETES MELLITUS WITH HYPERGLYCEMIA, WITH LONG-TERM CURRENT USE OF INSULIN (HCC): Primary | ICD-10-CM

## 2024-01-18 DIAGNOSIS — F33.9 DEPRESSION, RECURRENT (HCC): ICD-10-CM

## 2024-01-18 DIAGNOSIS — I73.9 PAD (PERIPHERAL ARTERY DISEASE) (HCC): Chronic | ICD-10-CM

## 2024-01-18 DIAGNOSIS — E11.65 TYPE 2 DIABETES MELLITUS WITH HYPERGLYCEMIA, WITH LONG-TERM CURRENT USE OF INSULIN (HCC): Primary | ICD-10-CM

## 2024-01-18 DIAGNOSIS — E03.9 ACQUIRED HYPOTHYROIDISM: ICD-10-CM

## 2024-01-18 DIAGNOSIS — Z89.611 S/P BILATERAL ABOVE KNEE AMPUTATION (HCC): ICD-10-CM

## 2024-01-18 DIAGNOSIS — I63.9 CEREBROVASCULAR ACCIDENT (CVA), UNSPECIFIED MECHANISM (HCC): ICD-10-CM

## 2024-01-18 DIAGNOSIS — Z89.612 S/P BILATERAL ABOVE KNEE AMPUTATION (HCC): ICD-10-CM

## 2024-01-18 DIAGNOSIS — E21.3 HYPERPARATHYROIDISM (HCC): ICD-10-CM

## 2024-01-18 PROCEDURE — 99309 SBSQ NF CARE MODERATE MDM 30: CPT

## 2024-01-22 VITALS
DIASTOLIC BLOOD PRESSURE: 59 MMHG | BODY MASS INDEX: 31.93 KG/M2 | WEIGHT: 186 LBS | HEART RATE: 62 BPM | RESPIRATION RATE: 18 BRPM | SYSTOLIC BLOOD PRESSURE: 119 MMHG | TEMPERATURE: 97.5 F

## 2024-01-22 PROBLEM — E03.9 ACQUIRED HYPOTHYROIDISM: Status: ACTIVE | Noted: 2024-01-22

## 2024-01-22 NOTE — ASSESSMENT & PLAN NOTE
stable  Resident followed by psychiatry   Continue buproprion 150 mg po daily  Continue sertraline 100 mg po daily at bedtime  Continue quetiapine 12.5 mg po daily at bedtime  Recently decreased from 25 mg  Monitor GDR of medication  Continue to monitor mood  Continue to provide 24/7 supportive care

## 2024-01-22 NOTE — ASSESSMENT & PLAN NOTE
Stable  Continue atorvastatin 40 mg po daily  Continue aspirin 81 mg po daily  Continue eliquis 5 mg po BID

## 2024-01-22 NOTE — PROGRESS NOTES
[address], [address], [place] [address]  [phone]    NAME: Dianna Varghese  AGE: 63 y.o. SEX: female    Progress Note    Location: [facility]  POS: 32 ([facility])  Code Status: Full Code    Chief complaint / Reason for visit:  Follow-up visit    Assessment/Plan:    Hyperparathyroidism (HCC)  CMP checked on 01/18/24  Calcium level is 8.9  Follows with endocrinology     Type 2 diabetes mellitus with hyperglycemia, with long-term current use of insulin (HCC)  Hemoglobin A1c (01/18/24)  7.7  Requires stricter control  Encourage CCD  Fasting blood glucose checks weekly with parameters to call  Avoid hypoglycemia  Hypoglycemia protocol  Continue Lantus subQ insulin 20 units daily  Continue Trulicity 0.5mL (0.75 mg) subQ weekly  Follow up HgbA1c Q3 months July/Oct/Jan/April    PAD (peripheral artery disease) (HCC)  S/p left AKA on 11/23/22  S/p right AKA stump revision on 01/27/23  Continue aspirin 81 mg po daily  Continue atorvastatin 40 mg po daily  Continue gabapentin 100 mg po Q8 hours  Continue eliquis 5 mg po BID    CVA (cerebral vascular accident) (HCC)  Stable  Continue atorvastatin 40 mg po daily  Continue aspirin 81 mg po daily  Continue eliquis 5 mg po BID    S/P bilateral above knee amputation (HCC)  Due to significant PAD and complications after attempted revascularizations resident required bilateral AKA  Left leg done on 11/23/22  Right leg done on 12/01/22  Continue follow up with Vascular  Continue PT/OT  Has started therapy working with bilateral prosthetics  Monitor skin daily before and after prosthetics are applied     Depression, recurrent (HCC)  stable  Resident followed by psychiatry   Continue buproprion 150 mg po daily  Continue sertraline 100 mg po daily at bedtime  Continue quetiapine 12.5 mg po daily at bedtime  Recently decreased from 25 mg  Monitor GDR of medication  Continue to monitor mood  Continue to provide 24/7 supportive care    Acquired  hypothyroidism  Recent TSH with Free T4 on 01/18/24  TSH=8.20  T4=0.60  TSH a year ago on 01/23/23 was 1.367  Started levothyroxine 25 mcg po daily  Recheck TSH with free T4 on 03/06/24  Monitor effective  Consult endocrinology       This is a 63 y.o. female seen today at [facility].  Medical history includes, not limited to, bilateral above knee amputations, cardiac arrest, type 2 DM, TIA, major depressive disorder, peripheral vascular disease, hypertension, hyperlipidemia, and GERD.    Resident seen today for a routine follow up visit.  Upon entering patient's room she is out of bed in her wheelchair.  She is alert and oriented x 3, able to answer all questions appropriately.  Currently she denies pain.  Since the last time I've seen her, she started wearing her prosthetic legs and working with therapy.  During this visit after discussing her progress with therapy she is encouraged to work on her exercises during the time she is not with therapy.  Therapy had stated that Dianna need to start showing improvement for her to continue.  This information was passed on to her which she states she understood and will work on her exercises. She states she has been eating well. No difficulty sleeping at night.  Denies N/V/D.      Nursing staff reporting a small red area on her left upper thigh area.  Spoke with nursing who will conduct a skin check when resident is placed back in bed when prosthetics can be removed.    Labs, vitals and orders verified and reviewed in PCC.      Reviewed resident with nursing staff and therapy.         Nursing and prior provider notes reviewed on this visit. Discussed visit with PCP and nursing staff/ supervisor.      Review of Systems   Constitutional:  Positive for activity change. Negative for appetite change, fatigue, fever and unexpected weight change.   HENT:  Negative for congestion.    Eyes:  Negative for pain, discharge and visual disturbance.   Respiratory:  Negative for  cough and shortness of breath.    Cardiovascular:  Negative for chest pain and palpitations.   Gastrointestinal:  Negative for abdominal pain, constipation and diarrhea.   Genitourinary:  Negative for difficulty urinating, dysuria, hematuria and urgency.   Musculoskeletal:  Positive for gait problem. Negative for arthralgias.   Skin:  Positive for color change (redness on left thigh).   Neurological:  Negative for syncope and weakness.   Psychiatric/Behavioral:  Negative for confusion and sleep disturbance.    All other systems reviewed and are negative.         ALLERGY: Reviewed, unchanged  Allergies   Allergen Reactions    Heparin Other (See Comments)     History of HIT       HISTORY:  Medical Hx: Reviewed, unchanged  Family Hx: Reviewed, unchanged  Soc Hx: Reviewed,  unchanged      Physical Exam  Vitals reviewed.   Constitutional:       General: She is not in acute distress.     Appearance: Normal appearance. She is not ill-appearing.   HENT:      Head: Normocephalic.      Right Ear: Tympanic membrane normal.      Left Ear: Tympanic membrane normal.      Nose: Nose normal. No congestion.      Mouth/Throat:      Mouth: Mucous membranes are moist.      Pharynx: Oropharynx is clear.   Eyes:      General:         Right eye: No discharge.         Left eye: No discharge.      Extraocular Movements: Extraocular movements intact.      Conjunctiva/sclera: Conjunctivae normal.      Pupils: Pupils are equal, round, and reactive to light.   Cardiovascular:      Rate and Rhythm: Normal rate and regular rhythm.      Pulses: Normal pulses.      Heart sounds: Normal heart sounds.   Pulmonary:      Effort: Pulmonary effort is normal. No respiratory distress.      Breath sounds: Normal breath sounds.   Chest:      Chest wall: No tenderness.   Abdominal:      General: Bowel sounds are normal.      Palpations: Abdomen is soft.      Tenderness: There is no abdominal tenderness.   Musculoskeletal:         General: Deformity present.  "No swelling. Normal range of motion.      Cervical back: Normal range of motion.      Right lower leg: No edema.      Left lower leg: No edema.      Comments: Bilateral AKA   Skin:     General: Skin is warm and dry.   Neurological:      Mental Status: She is alert and oriented to person, place, and time. Mental status is at baseline.      Motor: No weakness.   Psychiatric:         Mood and Affect: Mood normal.         Behavior: Behavior normal.         Thought Content: Thought content normal.         Judgment: Judgment normal.            Laboratory results / Imaging reviewed: Hard copy/ies in medical chart:    Vitals:    01/18/24 1558   BP: 119/59   Pulse: 62   Resp: 18   Temp: 97.5 °F (36.4 °C)       Current Medications:  All medications reviewed and updated in Nursing Home Chart    Please note: This note was completed in part utilizing a voice-recognition software may have been used in the preparation of this document. Grammatical errors, random word insertion, spelling mistakes, and incomplete sentences may be an occasional consequence of the system secondary to software limitations, ambient noise and hardware issues. Occasional wrong word or \"sound-alike\" substitutions may have occurred due to the inherent limitations of voice recognition software. At the time of dictation, efforts were made to edit, clarify and/or correct errors. Interpretation should be guided by context. Please read the chart carefully and recognize, using context, where substitutions have occurred. If you have any questions or concerns about the context, text or information contained within the body of this dictation, please contact myself, the provider, for further clarification.      ALEX Luevano  1/22/2024  "

## 2024-01-22 NOTE — ASSESSMENT & PLAN NOTE
Hemoglobin A1c (01/18/24)  7.7  Requires stricter control  Encourage CCD  Fasting blood glucose checks weekly with parameters to call  Avoid hypoglycemia  Hypoglycemia protocol  Continue Lantus subQ insulin 20 units daily  Continue Trulicity 0.5mL (0.75 mg) subQ weekly  Follow up HgbA1c Q3 months July/Oct/Jan/April

## 2024-01-22 NOTE — ASSESSMENT & PLAN NOTE
Recent TSH with Free T4 on 01/18/24  TSH=8.20  T4=0.60  TSH a year ago on 01/23/23 was 1.367  Started levothyroxine 25 mcg po daily  Recheck TSH with free T4 on 03/06/24  Monitor effective  Consult endocrinology

## 2024-01-22 NOTE — ASSESSMENT & PLAN NOTE
Due to significant PAD and complications after attempted revascularizations resident required bilateral AKA  Left leg done on 11/23/22  Right leg done on 12/01/22  Continue follow up with Vascular  Continue PT/OT  Has started therapy working with bilateral prosthetics  Monitor skin daily before and after prosthetics are applied

## 2024-01-22 NOTE — ASSESSMENT & PLAN NOTE
S/p left AKA on 11/23/22  S/p right AKA stump revision on 01/27/23  Continue aspirin 81 mg po daily  Continue atorvastatin 40 mg po daily  Continue gabapentin 100 mg po Q8 hours  Continue eliquis 5 mg po BID

## 2024-02-11 ENCOUNTER — TELEPHONE (OUTPATIENT)
Dept: OTHER | Facility: OTHER | Age: 64
End: 2024-02-11

## 2024-02-11 NOTE — TELEPHONE ENCOUNTER
Noticed pt has a blister on her right stump measuring 2.5 cm by 2.7 cm. There is no odor nor drainage. Pt is not complaining of any pain at the moment.     On call provider paged

## 2024-02-21 ENCOUNTER — NURSING HOME VISIT (OUTPATIENT)
Dept: GERIATRICS | Facility: OTHER | Age: 64
End: 2024-02-21
Payer: COMMERCIAL

## 2024-02-21 DIAGNOSIS — K21.9 GASTROESOPHAGEAL REFLUX DISEASE WITHOUT ESOPHAGITIS: ICD-10-CM

## 2024-02-21 DIAGNOSIS — E11.65 TYPE 2 DIABETES MELLITUS WITH HYPERGLYCEMIA, WITH LONG-TERM CURRENT USE OF INSULIN (HCC): ICD-10-CM

## 2024-02-21 DIAGNOSIS — Z79.4 TYPE 2 DIABETES MELLITUS WITH HYPERGLYCEMIA, WITH LONG-TERM CURRENT USE OF INSULIN (HCC): ICD-10-CM

## 2024-02-21 DIAGNOSIS — Z89.611 S/P BILATERAL ABOVE KNEE AMPUTATION (HCC): ICD-10-CM

## 2024-02-21 DIAGNOSIS — Z86.74 HISTORY OF CARDIAC ARREST: ICD-10-CM

## 2024-02-21 DIAGNOSIS — Z89.612 S/P BILATERAL ABOVE KNEE AMPUTATION (HCC): ICD-10-CM

## 2024-02-21 DIAGNOSIS — N18.31 ANEMIA DUE TO STAGE 3A CHRONIC KIDNEY DISEASE: ICD-10-CM

## 2024-02-21 DIAGNOSIS — E03.9 ACQUIRED HYPOTHYROIDISM: ICD-10-CM

## 2024-02-21 DIAGNOSIS — E11.42 DIABETIC PERIPHERAL NEUROPATHY (HCC): ICD-10-CM

## 2024-02-21 DIAGNOSIS — S78.119D: ICD-10-CM

## 2024-02-21 DIAGNOSIS — D63.1 ANEMIA DUE TO STAGE 3A CHRONIC KIDNEY DISEASE: ICD-10-CM

## 2024-02-21 DIAGNOSIS — E21.3 HYPERPARATHYROIDISM (HCC): ICD-10-CM

## 2024-02-21 DIAGNOSIS — E78.49 FAMILIAL COMBINED HYPERLIPIDEMIA: ICD-10-CM

## 2024-02-21 DIAGNOSIS — Z71.89 ADVANCE CARE PLANNING: ICD-10-CM

## 2024-02-21 DIAGNOSIS — Z86.73 HISTORY OF CVA (CEREBROVASCULAR ACCIDENT): ICD-10-CM

## 2024-02-21 DIAGNOSIS — F33.9 DEPRESSION, RECURRENT (HCC): ICD-10-CM

## 2024-02-21 DIAGNOSIS — I10 PRIMARY HYPERTENSION: ICD-10-CM

## 2024-02-21 DIAGNOSIS — R94.31 PROLONGED Q-T INTERVAL ON ECG: ICD-10-CM

## 2024-02-21 DIAGNOSIS — I73.9 PAD (PERIPHERAL ARTERY DISEASE) (HCC): Primary | Chronic | ICD-10-CM

## 2024-02-21 PROCEDURE — 99309 SBSQ NF CARE MODERATE MDM 30: CPT | Performed by: STUDENT IN AN ORGANIZED HEALTH CARE EDUCATION/TRAINING PROGRAM

## 2024-02-21 NOTE — ASSESSMENT & PLAN NOTE
Seems fairly mild, stable on lab review  -monitor on routine labs  -monitor for acute bleed - no present signs  -consider further workup, Heme consult if persistent/worsening  -transfuse PRN Hb <7  -low threshold to hold anticoagulation if evidence of bleed or worsening Hb

## 2024-02-21 NOTE — ASSESSMENT & PLAN NOTE
Recent TSH with Free T4 on 01/18/24  TSH=8.20  T4=0.60  No apparent acute/associated symptoms  Prior TSH on 01/23/23 was 1.367  Started levothyroxine 25 mcg po daily as of 1/19/24  Recheck TSH with free T4 on 03/06/24  Adjust dosing as appropriate  Consider Endocrine consult if persistently abnormal or symptomatic

## 2024-02-21 NOTE — ASSESSMENT & PLAN NOTE
Seems to be primary on chart review  Continue multivitamin/vit D  Monitor calcium on routine labs  CMP checked on 01/18/24  Calcium level is 8.9  Has followed with Endocrinology, consider updated visit/consultation if issue becomes symptomatic or worsens

## 2024-02-21 NOTE — ASSESSMENT & PLAN NOTE
Monitor BP - recently has been generally well controlled 110s-140s systolic  Avoid hypotension  Continue regimen including amlodipine, lisinopril, metoprolol, HCTZ, hydralazine, amiodarone  Consider weaning/adjusting extensive regimen as appropriate, especially if BP remains well controlled or becomes softer  Following Cardiology(next appt scheduled 4/8/24)

## 2024-02-21 NOTE — ASSESSMENT & PLAN NOTE
Due to significant PAD and complications after attempted revascularizations resident required bilateral AKA  Left leg done on 11/23/22  Right leg done on 12/01/22  Continue follow up with Vascular  Continue PT/OT  Has started therapy working with bilateral prosthetics

## 2024-02-21 NOTE — ASSESSMENT & PLAN NOTE
Patient verbalizes HCP as daughter Jailyn Varghese  Extensive discussion/education with patient today regarding their wishes with respect to resuscitative measures; discussed potential risks vs benefits of resuscitative measures; patient verbalizes understanding, appears to have capacity to make this decision presently, and clearly verbalizes a desire for Full Code, consistent with prior

## 2024-02-21 NOTE — ASSESSMENT & PLAN NOTE
-stable  -continue PRN calcium carbonate  -recommend OOB with meals, sit upright for at least 30 minutes afterwards, avoid trigger foods  -continue to monitor  -follow up with PCP, GI as appropriate

## 2024-02-21 NOTE — ASSESSMENT & PLAN NOTE
Noted on prior EKGs  No apparent acute/associated symptoms  Avoid QT prolonging medications as able  Following with Cardiology

## 2024-02-21 NOTE — ASSESSMENT & PLAN NOTE
S/p cardiac arrest on 1/18/2023 considered due to electrolyte abnormalities/severe sepsis  No recent/acute cardiac complaints  Continue cardiac regimen including aspirin, Eliquis, amiodarone, atorvastatin  Follow up with Cardiology as appropriate

## 2024-02-21 NOTE — ASSESSMENT & PLAN NOTE
Pt had right CVA 11/14/2022  Continue aspirin, statin  Pt has been off plavix since 03/2023 after vascular visit

## 2024-02-21 NOTE — ASSESSMENT & PLAN NOTE
Reports mood stable and well controlled  Psych following  Supportive care  Continue regimen including  Continue buproprion 150 mg po daily  Continue sertraline 100 mg po daily at bedtime  Continue quetiapine 12.5 mg po daily at bedtime  Recently decreased from 25 mg  Monitor GDR of medication, seems to be stable/tolerating well for now

## 2024-02-21 NOTE — ASSESSMENT & PLAN NOTE
S/p left AKA on 11/23/22  S/p right AKA stump revision on 01/27/23  Continue aspirin, statin, Eliquis  Working with rehab regarding prosthetics, PT/OT as appropriate

## 2024-02-21 NOTE — ASSESSMENT & PLAN NOTE
Lab Results   Component Value Date    HGBA1C 9.8 (H) 10/25/2022     Hemoglobin A1c (01/18/24)  7.7  Encourage CCD  Monitor glucose routinely - recent fasting sugars have generally been high 100s-low 200s  Avoid hypoglycemia  Continue Lantus subQ insulin - increase to 22 units daily  Continue Trulicity 0.5mL (0.75 mg) subQ weekly  Follow up HgbA1c Q3 months July/Oct/Jan/April

## 2024-02-21 NOTE — PROGRESS NOTES
[facility]  Facility: [facility]    PROGRESS NOTE  Nursing Home Place of Service: nursing home place of service: POS 32 Unskilled- No Part A Coverage    NAME: Dianna Varghese  : 1960 AGE: 63 y.o. SEX: female MRN: [id number]  DATE OF ENCOUNTER: 2024    Assessment and Plan     Problem List Items Addressed This Visit       Esophageal reflux     -stable  -continue PRN calcium carbonate  -recommend OOB with meals, sit upright for at least 30 minutes afterwards, avoid trigger foods  -continue to monitor  -follow up with PCP, GI as appropriate           Depression, recurrent (HCC)     Reports mood stable and well controlled  Psych following  Supportive care  Continue regimen including  Continue buproprion 150 mg po daily  Continue sertraline 100 mg po daily at bedtime  Continue quetiapine 12.5 mg po daily at bedtime  Recently decreased from 25 mg  Monitor GDR of medication, seems to be stable/tolerating well for now         Hypertension     Monitor BP - recently has been generally well controlled 110s-140s systolic  Avoid hypotension  Continue regimen including amlodipine, lisinopril, metoprolol, HCTZ, hydralazine, amiodarone  Consider weaning/adjusting extensive regimen as appropriate, especially if BP remains well controlled or becomes softer  Following Cardiology(next appt scheduled 24)         Diabetic peripheral neuropathy (HCC)       Lab Results   Component Value Date    HGBA1C 9.8 (H) 10/25/2022     Patient is s/p bilateral AKA  Neuropathy concern seems controlled/stable on gabapentin 200mg BID  Consider wean if remaining stable/controlled and in setting of bilateral AKA if peripheral neuropathy becomes less of an issue as she heals and gets used to her prosthetics         Familial combined hyperlipidemia     Continue statin         Type 2 diabetes mellitus with hyperglycemia, with long-term current use of insulin (HCC)       Lab Results   Component Value Date    HGBA1C 9.8 (H)  10/25/2022     Hemoglobin A1c (01/18/24)  7.7  Encourage CCD  Monitor glucose routinely - recent fasting sugars have generally been high 100s-low 200s  Avoid hypoglycemia  Continue Lantus subQ insulin - increase to 22 units daily  Continue Trulicity 0.5mL (0.75 mg) subQ weekly  Follow up HgbA1c Q3 months July/Oct/Jan/April         Hyperparathyroidism (HCC)     Seems to be primary on chart review  Continue multivitamin/vit D  Monitor calcium on routine labs  CMP checked on 01/18/24  Calcium level is 8.9  Has followed with Endocrinology, consider updated visit/consultation if issue becomes symptomatic or worsens         PAD (peripheral artery disease) (HCC) - Primary (Chronic)     S/p left AKA on 11/23/22  S/p right AKA stump revision on 01/27/23  Continue aspirin, statin, Eliquis  Working with rehab regarding prosthetics, PT/OT as appropriate         Anemia     Seems fairly mild, stable on lab review  -monitor on routine labs  -monitor for acute bleed - no present signs  -consider further workup, Heme consult if persistent/worsening  -transfuse PRN Hb <7  -low threshold to hold anticoagulation if evidence of bleed or worsening Hb         History of CVA (cerebrovascular accident)     Pt had right CVA 11/14/2022  Continue aspirin, statin  Pt has been off plavix since 03/2023 after vascular visit           Prolonged Q-T interval on ECG     Noted on prior EKGs  No apparent acute/associated symptoms  Avoid QT prolonging medications as able  Following with Cardiology         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)     Bilateral AKA as above         S/P bilateral above knee amputation (HCC)     Due to significant PAD and complications after attempted revascularizations resident required bilateral AKA  Left leg done on 11/23/22  Right leg done on 12/01/22  Continue follow up with Vascular  Continue PT/OT  Has started therapy working with bilateral prosthetics         Acquired hypothyroidism     Recent TSH  with Free T4 on 01/18/24  TSH=8.20  T4=0.60  No apparent acute/associated symptoms  Prior TSH on 01/23/23 was 1.367  Started levothyroxine 25 mcg po daily as of 1/19/24  Recheck TSH with free T4 on 03/06/24  Adjust dosing as appropriate  Consider Endocrine consult if persistently abnormal or symptomatic         History of cardiac arrest     S/p cardiac arrest on 1/18/2023 considered due to electrolyte abnormalities/severe sepsis  No recent/acute cardiac complaints  Continue cardiac regimen including aspirin, Eliquis, amiodarone, atorvastatin  Follow up with Cardiology as appropriate         Advance care planning     Patient verbalizes HCP as daughter Jailyn Varghese  Extensive discussion/education with patient today regarding their wishes with respect to resuscitative measures; discussed potential risks vs benefits of resuscitative measures; patient verbalizes understanding, appears to have capacity to make this decision presently, and clearly verbalizes a desire for Full Code, consistent with prior                Chief Complaint     Follow up 60 day    History of Present Illness     Dianna Varghese is a 63 y.o. female who was seen today for follow up. PMH including bilateral above knee amputations, cardiac arrest, type 2 DM, TIA, major depressive disorder, peripheral vascular disease, hypertension, hyperlipidemia, and GERD     Patient seen and examined in room  Others present: none  Patient laying in bed  Appears comfortable, awake, alert, oriented to situation, able to converse appropriately  Polite, Aox3, in good spirits, notes she feels well recently and has no acute concerns. She has been working with her prosthetics for bilateral AKA, reports this is going well, she has been working with therapy and states she has only been doing this for a few days but is excited to be making progress with the goal of being able to ambulate with walker with her prosthetics. No acute pain anywhere. Appetite good. Having  "daily regular BM. Breathing fine. Urinating fine. No acute cardiopulmonary, abdominal, or urinary symptoms; see ROS for more details.    No further questions or acute concerns identified.    Lab Review:   1/18/24: CBC with Hb 10.8; CMP with Cr 1.43, eGFR 41 (recent baseline seems around 40s-60s); TSH 8.20, FT4 0.6; A1c 7.7      EKG Jan 2023: NSR, 91bpm, Qtc 511  LEON Jan 2023: EF 60, \"No vegetations or other evidence of endocarditis seen \"    The following portions of the patient's history were reviewed and updated as appropriate: allergies, current medications, past family history, past medical history, past social history, past surgical history and problem list.    Review of Systems     Review of Systems   Constitutional:  Negative for appetite change, chills, diaphoresis and fever.   HENT:  Negative for drooling, ear pain, hearing loss, rhinorrhea, sore throat and trouble swallowing.    Eyes:  Negative for pain, discharge, redness, itching and visual disturbance.   Respiratory:  Negative for cough, chest tightness, shortness of breath and wheezing.    Cardiovascular:  Negative for chest pain, palpitations and leg swelling.   Gastrointestinal:  Negative for abdominal pain, blood in stool, constipation, diarrhea, nausea and vomiting.   Genitourinary:  Negative for difficulty urinating, dysuria and hematuria.   Musculoskeletal:  Positive for gait problem. Negative for arthralgias, back pain and neck pain.   Skin:  Negative for color change.   Neurological:  Negative for dizziness, facial asymmetry, speech difficulty, weakness, light-headedness and headaches.   Psychiatric/Behavioral:  Negative for agitation, behavioral problems and confusion. The patient is not nervous/anxious and is not hyperactive.    All other systems reviewed and are negative.      Active Problem List     Patient Active Problem List   Diagnosis    Esophageal reflux    DM (diabetes mellitus) type II, controlled, with peripheral vascular disorder " (HCC)    Class 3 severe obesity due to excess calories with serious comorbidity and body mass index (BMI) of 40.0 to 44.9 in adult (HCC)    Depression, recurrent (HCC)    Anxiety    Hypertension    Stroke (HCC)    Diabetic peripheral neuropathy (HCC)    Vitamin D deficiency    Systolic murmur    Familial combined hyperlipidemia    Arthritis    Transaminitis    Acute colitis    Moderate protein-calorie malnutrition (HCC)    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 (HCC)    Type 2 diabetes mellitus with hyperglycemia, with long-term current use of insulin (HCC)    Hyperparathyroidism (HCC)    Wound of lower extremity    Non healing left heel wound    Hypertensive urgency    Hypokalemia    PAD (peripheral artery disease) (HCC)    Generalized edema due to fluid overload    Anemia    Diabetic ulcer of heel associated with diabetes mellitus due to underlying condition (HCC)    HIT (heparin-induced thrombocytopenia) (HCC)    CVA (cerebral vascular accident) (HCC)    Mild protein-calorie malnutrition (HCC)    History of CVA (cerebrovascular accident)    Fall    Cardiac arrest (HCC)    Troponin level elevated    Hypomagnesemia    Bacteremia    Osteomyelitis (HCC)    Prolonged Q-T interval on ECG    Thrombocytosis    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)    Weight gain    S/P bilateral above knee amputation (HCC)    Acquired hypothyroidism    History of cardiac arrest    Advance care planning       Objective     Vital Signs:     BP: 112/71 mmHg  2/14/2024 16:27   Temp:97.5 °F  1/10/2024 09:47 Pulse:67 bpm  2/21/2024 08:53 Weight:193 Lbs  2/13/2024 21:52   Resp:18 Breaths/min  1/10/2024 09:47 BS:183 mg/dL  2/16/2024 05:30 O2:97 %  6/11/2023 11:20 Pain:0  2/21/2024 08:50       Physical Exam  Vitals reviewed.   Constitutional:       General: She is not in acute distress.     Appearance: She is obese. She is not  toxic-appearing or diaphoretic.   HENT:      Head: Normocephalic and atraumatic.      Right Ear: External ear normal.      Left Ear: External ear normal.      Nose: Nose normal. No rhinorrhea.      Mouth/Throat:      Mouth: Mucous membranes are moist.      Pharynx: Oropharynx is clear. No posterior oropharyngeal erythema.   Eyes:      General: No scleral icterus.        Right eye: No discharge.         Left eye: No discharge.      Extraocular Movements: Extraocular movements intact.      Conjunctiva/sclera: Conjunctivae normal.      Pupils: Pupils are equal, round, and reactive to light.   Cardiovascular:      Rate and Rhythm: Normal rate and regular rhythm.   Pulmonary:      Effort: Pulmonary effort is normal. No respiratory distress.      Breath sounds: Normal breath sounds. No wheezing or rales.   Abdominal:      General: Bowel sounds are normal.      Palpations: Abdomen is soft.      Tenderness: There is no abdominal tenderness. There is no guarding.   Musculoskeletal:      Cervical back: No rigidity.      Comments: Bilateral AKA, both appear healing well  Small site of blister/excoriation at inferior aspect of right AKA site appears healing well without bleed, drainage, or erythema   Skin:     General: Skin is warm and dry.      Coloration: Skin is not jaundiced.   Neurological:      General: No focal deficit present.      Mental Status: She is alert and oriented to person, place, and time. Mental status is at baseline.   Psychiatric:         Mood and Affect: Mood normal.         Behavior: Behavior normal.         Thought Content: Thought content normal.         Judgment: Judgment normal.         Pertinent Laboratory/Diagnostic Studies:  Laboratory and Imaging studies reviewed. Full report in the paper chart.     Current Medications   Medications reviewed and updated in facility chart.      -Total time spent on this encounter today including documentation and workup review, face to face time, history and exam,  and documentation/orders was approximately 40 minutes.  -This note will be copied to [facility] EMR where vitals and medication orders are placed.    Seb Chun D.O.  Geriatric Medicine  2/21/2024 2:07 PM

## 2024-02-27 ENCOUNTER — NURSING HOME VISIT (OUTPATIENT)
Dept: GERIATRICS | Facility: OTHER | Age: 64
End: 2024-02-27
Payer: COMMERCIAL

## 2024-02-27 VITALS
BODY MASS INDEX: 33.13 KG/M2 | HEART RATE: 69 BPM | TEMPERATURE: 98 F | SYSTOLIC BLOOD PRESSURE: 150 MMHG | WEIGHT: 193 LBS | RESPIRATION RATE: 19 BRPM | DIASTOLIC BLOOD PRESSURE: 55 MMHG | OXYGEN SATURATION: 96 %

## 2024-02-27 DIAGNOSIS — Z89.612 S/P BILATERAL ABOVE KNEE AMPUTATION (HCC): Primary | ICD-10-CM

## 2024-02-27 DIAGNOSIS — E11.42 DIABETIC PERIPHERAL NEUROPATHY (HCC): ICD-10-CM

## 2024-02-27 DIAGNOSIS — Z89.611 S/P BILATERAL ABOVE KNEE AMPUTATION (HCC): Primary | ICD-10-CM

## 2024-02-27 PROCEDURE — 99308 SBSQ NF CARE LOW MDM 20: CPT

## 2024-02-27 RX ORDER — LEVOTHYROXINE SODIUM 0.03 MG/1
25 TABLET ORAL DAILY
COMMUNITY

## 2024-02-27 NOTE — PROGRESS NOTES
[address], [address], [place] [address]  [phone]    NAME: Dianna Varghese  AGE: 63 y.o. SEX: female    Progress Note    Location: [facility]  POS: 32 ([facility])  Code Status: Full Code    Chief complaint / Reason for visit:  Acute Visit    Assessment/Plan:    S/P bilateral above knee amputation (HCC)  Due to significant PAD and complications after attempted revascularizations resident required bilateral AKA  Left leg done on 11/23/22  Right leg done on 12/01/22  Continue follow up with Vascular  Continue PT/OT  Has started therapy working with bilateral prosthetics  Monitor skin daily before and after prosthetics are applied   Recent blister noticed on right AKA, nursing was completing wound care, therapy was working with her without applying prosthetics for a couple days  Wound check today with blister having opened and scabbing over, no redness noted around site  Continue to monitor site, apply cushion when using prosthetics and ensure proper application of bilateral prosthetics     Diabetic peripheral neuropathy (HCC)    Lab Results   Component Value Date    HGBA1C 9.8 (H) 10/25/2022     S/P bilateral AKA  Continue gabapentin 200 mg po BID  Continue to monitor skin daily      This is a 63 y.o. female seen today at [facility].  Medical history includes, not limited to, bilateral above knee amputations, cardiac arrest, type 2 DM, TIA, major depressive disorder, peripheral vascular disease, hypertension, hyperlipidemia, and GERD.    Resident seen today for an acute visit for a wound check with therapy.  Upon entering the room we are able to inspect her skin on the right AKA.  She had a blister that has now opened and started to heal.  Therapy states that when prosthetics are applied they have been placing a cushion to protect her skin.  Currently she denies pain.  Discussed with therapy to monitor skin.       Reviewed resident with nursing staff and  therapy.         Nursing and prior provider notes reviewed on this visit. Discussed visit with PCP and nursing staff/ supervisor.      Review of Systems   Constitutional:  Positive for activity change. Negative for appetite change, fatigue, fever and unexpected weight change.   HENT:  Negative for congestion.    Eyes:  Negative for pain, discharge and visual disturbance.   Respiratory:  Negative for cough and shortness of breath.    Cardiovascular:  Negative for chest pain and palpitations.   Gastrointestinal:  Negative for abdominal pain, constipation and diarrhea.   Genitourinary:  Negative for difficulty urinating, dysuria, hematuria and urgency.   Musculoskeletal:  Positive for gait problem. Negative for arthralgias.   Skin:  Positive for color change (healing blister on right AKA) and wound.   Neurological:  Negative for syncope and weakness.   Psychiatric/Behavioral:  Negative for confusion and sleep disturbance.    All other systems reviewed and are negative.         ALLERGY: Reviewed, unchanged  Allergies   Allergen Reactions    Heparin Other (See Comments)     History of HIT       HISTORY:  Medical Hx: Reviewed, unchanged  Family Hx: Reviewed, unchanged  Soc Hx: Reviewed,  unchanged      Physical Exam  Vitals reviewed.   Constitutional:       General: She is not in acute distress.     Appearance: Normal appearance. She is not ill-appearing.   HENT:      Head: Normocephalic.      Right Ear: Tympanic membrane normal.      Left Ear: Tympanic membrane normal.      Nose: Nose normal. No congestion.      Mouth/Throat:      Mouth: Mucous membranes are moist.      Pharynx: Oropharynx is clear.   Eyes:      General:         Right eye: No discharge.         Left eye: No discharge.      Extraocular Movements: Extraocular movements intact.      Conjunctiva/sclera: Conjunctivae normal.      Pupils: Pupils are equal, round, and reactive to light.   Cardiovascular:      Rate and Rhythm: Normal rate and regular rhythm.      " Pulses: Normal pulses.      Heart sounds: Normal heart sounds.   Pulmonary:      Effort: Pulmonary effort is normal. No respiratory distress.      Breath sounds: Normal breath sounds.   Chest:      Chest wall: No tenderness.   Abdominal:      General: Bowel sounds are normal.      Palpations: Abdomen is soft.      Tenderness: There is no abdominal tenderness.   Musculoskeletal:         General: Deformity present. No swelling. Normal range of motion.      Cervical back: Normal range of motion.      Right lower leg: No edema.      Left lower leg: No edema.      Comments: Bilateral AKA   Skin:     General: Skin is warm and dry.   Neurological:      Mental Status: She is alert and oriented to person, place, and time. Mental status is at baseline.      Motor: No weakness.   Psychiatric:         Mood and Affect: Mood normal.         Behavior: Behavior normal.         Thought Content: Thought content normal.         Judgment: Judgment normal.            Laboratory results / Imaging reviewed: Hard copy/ies in medical chart:    Vitals:    02/27/24 1848   BP: 150/55   Pulse: 69   Resp: 19   Temp: 98 °F (36.7 °C)   SpO2: 96%       Current Medications:  All medications reviewed and updated in Nursing Home Chart    Please note: This note was completed in part utilizing a voice-recognition software may have been used in the preparation of this document. Grammatical errors, random word insertion, spelling mistakes, and incomplete sentences may be an occasional consequence of the system secondary to software limitations, ambient noise and hardware issues. Occasional wrong word or \"sound-alike\" substitutions may have occurred due to the inherent limitations of voice recognition software. At the time of dictation, efforts were made to edit, clarify and/or correct errors. Interpretation should be guided by context. Please read the chart carefully and recognize, using context, where substitutions have occurred. If you have any " questions or concerns about the context, text or information contained within the body of this dictation, please contact myself, the provider, for further clarification.      ALEX Luevano  2/27/2024

## 2024-02-28 NOTE — ASSESSMENT & PLAN NOTE
Lab Results   Component Value Date    HGBA1C 9.8 (H) 10/25/2022     S/P bilateral AKA  Continue gabapentin 200 mg po BID  Continue to monitor skin daily

## 2024-02-28 NOTE — ASSESSMENT & PLAN NOTE
Due to significant PAD and complications after attempted revascularizations resident required bilateral AKA  Left leg done on 11/23/22  Right leg done on 12/01/22  Continue follow up with Vascular  Continue PT/OT  Has started therapy working with bilateral prosthetics  Monitor skin daily before and after prosthetics are applied   Recent blister noticed on right AKA, nursing was completing wound care, therapy was working with her without applying prosthetics for a couple days  Wound check today with blister having opened and scabbing over, no redness noted around site  Continue to monitor site, apply cushion when using prosthetics and ensure proper application of bilateral prosthetics

## 2024-03-20 ENCOUNTER — NURSING HOME VISIT (OUTPATIENT)
Dept: GERIATRICS | Facility: OTHER | Age: 64
End: 2024-03-20
Payer: COMMERCIAL

## 2024-03-20 DIAGNOSIS — Z89.611 S/P BILATERAL ABOVE KNEE AMPUTATION (HCC): ICD-10-CM

## 2024-03-20 DIAGNOSIS — Z89.612 S/P BILATERAL ABOVE KNEE AMPUTATION (HCC): ICD-10-CM

## 2024-03-20 DIAGNOSIS — E11.65 TYPE 2 DIABETES MELLITUS WITH HYPERGLYCEMIA, WITH LONG-TERM CURRENT USE OF INSULIN (HCC): Primary | ICD-10-CM

## 2024-03-20 DIAGNOSIS — Z79.4 TYPE 2 DIABETES MELLITUS WITH HYPERGLYCEMIA, WITH LONG-TERM CURRENT USE OF INSULIN (HCC): Primary | ICD-10-CM

## 2024-03-20 DIAGNOSIS — F33.9 DEPRESSION, RECURRENT (HCC): ICD-10-CM

## 2024-03-20 DIAGNOSIS — E03.9 ACQUIRED HYPOTHYROIDISM: ICD-10-CM

## 2024-03-20 DIAGNOSIS — I73.9 PAD (PERIPHERAL ARTERY DISEASE) (HCC): Chronic | ICD-10-CM

## 2024-03-20 PROCEDURE — 99309 SBSQ NF CARE MODERATE MDM 30: CPT

## 2024-04-02 VITALS
RESPIRATION RATE: 19 BRPM | BODY MASS INDEX: 33.39 KG/M2 | TEMPERATURE: 98 F | SYSTOLIC BLOOD PRESSURE: 133 MMHG | HEART RATE: 76 BPM | WEIGHT: 194.5 LBS | DIASTOLIC BLOOD PRESSURE: 67 MMHG | OXYGEN SATURATION: 96 %

## 2024-04-02 RX ORDER — TRAVOPROST OPHTHALMIC SOLUTION 0.04 MG/ML
1 SOLUTION OPHTHALMIC
COMMUNITY

## 2024-04-02 NOTE — ASSESSMENT & PLAN NOTE
Due to significant PAD and complications after attempted revascularizations resident required bilateral AKA  Left leg done on 11/23/22  Right leg done on 12/01/22  Continue follow up with Vascular  Continue PT/OT  Feeling more comfortable with her bilateral prosthetics

## 2024-04-02 NOTE — ASSESSMENT & PLAN NOTE
Hemoglobin A1c (01/18/24)  7.7  Requires stricter control  Encourage CCD  Fasting blood glucose checks weekly with parameters to call  Avoid hypoglycemia  Hypoglycemia protocol  Continue Lantus subQ insulin 22 units daily  Continue Trulicity 0.5mL (0.75 mg) subQ weekly  Follow up HgbA1c Q3 months July/Oct/Jan/April

## 2024-04-02 NOTE — ASSESSMENT & PLAN NOTE
Recent TSH with Free T4 on 01/18/24  TSH=8.20  T4=0.60  At that time started levothyroxine 25 mcg po daily  Recheck TSH with free T4 on 03/06/24  TSH=7.96  T4=0.70  Increased levothyroxine to 50 mcg po daily  Repeat TSH on 04/01

## 2024-04-02 NOTE — PROGRESS NOTES
[address], [address], [place] [address]  [phone]    NAME: Dianna Varghese  AGE: 63 y.o. SEX: female    Progress Note    Location: [facility]  POS: 32 ([facility])  Code Status: Full Code    Chief complaint / Reason for visit:  Follow-up visit    Assessment/Plan:    S/P bilateral above knee amputation (HCC)  Due to significant PAD and complications after attempted revascularizations resident required bilateral AKA  Left leg done on 11/23/22  Right leg done on 12/01/22  Continue follow up with Vascular  Continue PT/OT  Feeling more comfortable with her bilateral prosthetics    Depression, recurrent (HCC)  stable  Resident followed by psychiatry   Continue buproprion 150 mg po daily  Continue sertraline 100 mg po daily at bedtime  Continue quetiapine 12.5 mg po daily at bedtime  Recently decreased from 25 mg  Monitor GDR of medication  Continue to monitor mood  Continue to provide 24/7 supportive care    Acquired hypothyroidism  Recent TSH with Free T4 on 01/18/24  TSH=8.20  T4=0.60  At that time started levothyroxine 25 mcg po daily  Recheck TSH with free T4 on 03/06/24  TSH=7.96  T4=0.70  Increased levothyroxine to 50 mcg po daily  Repeat TSH on 04/01    Type 2 diabetes mellitus with hyperglycemia, with long-term current use of insulin (HCC)  Hemoglobin A1c (01/18/24)  7.7  Requires stricter control  Encourage CCD  Fasting blood glucose checks weekly with parameters to call  Avoid hypoglycemia  Hypoglycemia protocol  Continue Lantus subQ insulin 22 units daily  Continue Trulicity 0.5mL (0.75 mg) subQ weekly  Follow up HgbA1c Q3 months July/Oct/Jan/April    PAD (peripheral artery disease) (HCC)  S/p left AKA on 11/23/22  S/p right AKA stump revision on 01/27/23  Continue aspirin 81 mg po daily  Continue atorvastatin 40 mg po daily  Continue gabapentin 100 mg po Q8 hours  Continue eliquis 5 mg po BID      This is a 63 y.o. female seen today at [facility].  Medical  history includes, not limited to, bilateral above knee amputations, cardiac arrest, type 2 DM, TIA, major depressive disorder, peripheral vascular disease, hypertension, hyperlipidemia, and GERD.    Resident seen today for a routine follow up visit.  Upon entering the room she is out of bed in her wheelchair, prosthetics in place.  She states the prosthetics have been feeling good, no irritation.  She has been working well with therapy.  Putting in a lot more effort.  Denies bowel or bladder issues.  States she has been sleeping well at night.  No issues at this time.      Reviewed resident with nursing staff and therapy.         Nursing and prior provider notes reviewed on this visit. Discussed visit with PCP and nursing staff/ supervisor.      Review of Systems   Constitutional:  Positive for activity change. Negative for appetite change, fatigue, fever and unexpected weight change.   HENT:  Negative for congestion.    Eyes:  Negative for pain, discharge and visual disturbance.   Respiratory:  Negative for cough and shortness of breath.    Cardiovascular:  Negative for chest pain and palpitations.   Gastrointestinal:  Negative for abdominal pain, constipation and diarrhea.   Genitourinary:  Negative for difficulty urinating, dysuria, hematuria and urgency.   Musculoskeletal:  Positive for gait problem. Negative for arthralgias.   Neurological:  Negative for syncope and weakness.   Psychiatric/Behavioral:  Negative for confusion and sleep disturbance.    All other systems reviewed and are negative.         ALLERGY: Reviewed, unchanged  Allergies   Allergen Reactions    Heparin Other (See Comments)     History of HIT       HISTORY:  Medical Hx: Reviewed, unchanged  Family Hx: Reviewed, unchanged  Soc Hx: Reviewed,  unchanged      Physical Exam  Vitals reviewed.   Constitutional:       General: She is not in acute distress.     Appearance: Normal appearance. She is not ill-appearing.   HENT:      Head: Normocephalic.       Right Ear: Tympanic membrane normal.      Left Ear: Tympanic membrane normal.      Nose: Nose normal. No congestion.      Mouth/Throat:      Mouth: Mucous membranes are moist.      Pharynx: Oropharynx is clear.   Eyes:      General:         Right eye: No discharge.         Left eye: No discharge.      Extraocular Movements: Extraocular movements intact.      Conjunctiva/sclera: Conjunctivae normal.      Pupils: Pupils are equal, round, and reactive to light.   Cardiovascular:      Rate and Rhythm: Normal rate and regular rhythm.      Pulses: Normal pulses.      Heart sounds: Normal heart sounds.   Pulmonary:      Effort: Pulmonary effort is normal. No respiratory distress.      Breath sounds: Normal breath sounds.   Chest:      Chest wall: No tenderness.   Abdominal:      General: Bowel sounds are normal.      Palpations: Abdomen is soft.      Tenderness: There is no abdominal tenderness.   Musculoskeletal:         General: Deformity present. No swelling. Normal range of motion.      Cervical back: Normal range of motion.      Right lower leg: No edema.      Left lower leg: No edema.      Comments: Bilateral AKA   Skin:     General: Skin is warm and dry.   Neurological:      Mental Status: She is alert and oriented to person, place, and time. Mental status is at baseline.      Motor: No weakness.   Psychiatric:         Mood and Affect: Mood normal.         Behavior: Behavior normal.         Thought Content: Thought content normal.         Judgment: Judgment normal.            Laboratory results / Imaging reviewed: Hard copy/ies in medical chart:    Vitals:    04/02/24 1548   BP: 133/67   Pulse: 76   Resp: 19   Temp: 98 °F (36.7 °C)   SpO2: 96%       Current Medications:  All medications reviewed and updated in Nursing Home Chart    Please note: This note was completed in part utilizing a voice-recognition software may have been used in the preparation of this document. Grammatical errors, random word insertion,  "spelling mistakes, and incomplete sentences may be an occasional consequence of the system secondary to software limitations, ambient noise and hardware issues. Occasional wrong word or \"sound-alike\" substitutions may have occurred due to the inherent limitations of voice recognition software. At the time of dictation, efforts were made to edit, clarify and/or correct errors. Interpretation should be guided by context. Please read the chart carefully and recognize, using context, where substitutions have occurred. If you have any questions or concerns about the context, text or information contained within the body of this dictation, please contact myself, the provider, for further clarification.      ALEX Luevano  4/2/2024  "

## 2024-04-03 NOTE — PLAN OF CARE
Problem: PHYSICAL THERAPY ADULT  Goal: Performs mobility at highest level of function for planned discharge setting  See evaluation for individualized goals  Description: Treatment/Interventions: Functional transfer training, LE strengthening/ROM, Therapeutic exercise, Endurance training, Patient/family training, Equipment eval/education, Bed mobility, Compensatory technique education, Gait training, Continued evaluation, Spoke to nursing, Spoke to MD, Spoke to case management, OT          See flowsheet documentation for full assessment, interventions and recommendations  Outcome: Progressing  Note: Prognosis: Fair  Problem List: Decreased strength, Decreased range of motion, Decreased endurance, Impaired balance, Decreased mobility, Decreased safety awareness, Decreased skin integrity, Pain  Assessment: Pt seen for PT treatment today  Pt more cooperative with therapy  Pt able to get to EOB today with max A x 2 and sat x 10' with varying amount of assistance  CG --> max A  Pt was able to sit without assistance briefly x 2 with use of BUE on bed rails  Pt is progressing with mobility and activity tolerance  Goals updated  The patient's AM-PAC Basic Mobility Inpatient Short Form Raw Score is 8  A Raw score of less than or equal to 16 suggests the patient may benefit from discharge to post-acute rehabilitation services  Please also refer to the recommendation of the Physical Therapist for safe discharge planning  Continue to recommend STR at d/c to maximize safe functional mobility  Barriers to Discharge: None  Barriers to Discharge Comments: alone  PT Discharge Recommendation: Post acute rehabilitation services    See flowsheet documentation for full assessment  [Alert] : alert [Well Nourished] : well nourished [Obese] : obese [No Acute Distress] : no acute distress [Well Developed] : well developed [Normal Sclera/Conjunctiva] : normal sclera/conjunctiva [EOMI] : extra ocular movement intact [No Proptosis] : no proptosis [Normal Oropharynx] : the oropharynx was normal [Thyroid Not Enlarged] : the thyroid was not enlarged [No Thyroid Nodules] : no palpable thyroid nodules [No Respiratory Distress] : no respiratory distress [No Accessory Muscle Use] : no accessory muscle use [Clear to Auscultation] : lungs were clear to auscultation bilaterally [Normal S1, S2] : normal S1 and S2 [Normal Rate] : heart rate was normal [Regular Rhythm] : with a regular rhythm [No Edema] : no peripheral edema [Pedal Pulses Normal] : the pedal pulses are present [Normal Bowel Sounds] : normal bowel sounds [Not Tender] : non-tender [Not Distended] : not distended [Soft] : abdomen soft [Normal Anterior Cervical Nodes] : no anterior cervical lymphadenopathy [Normal Posterior Cervical Nodes] : no posterior cervical lymphadenopathy [No Spinal Tenderness] : no spinal tenderness [Spine Straight] : spine straight [No Stigmata of Cushings Syndrome] : no stigmata of Cushings Syndrome [Normal Gait] : normal gait [Normal Strength/Tone] : muscle strength and tone were normal [No Rash] : no rash [Acanthosis Nigricans] : no acanthosis nigricans [Normal Reflexes] : deep tendon reflexes were 2+ and symmetric [No Tremors] : no tremors [Oriented x3] : oriented to person, place, and time

## 2024-04-08 ENCOUNTER — OFFICE VISIT (OUTPATIENT)
Dept: CARDIOLOGY CLINIC | Facility: CLINIC | Age: 64
End: 2024-04-08
Payer: COMMERCIAL

## 2024-04-08 VITALS
WEIGHT: 200 LBS | OXYGEN SATURATION: 95 % | BODY MASS INDEX: 34.33 KG/M2 | DIASTOLIC BLOOD PRESSURE: 62 MMHG | HEART RATE: 63 BPM | SYSTOLIC BLOOD PRESSURE: 100 MMHG

## 2024-04-08 DIAGNOSIS — I10 PRIMARY HYPERTENSION: ICD-10-CM

## 2024-04-08 DIAGNOSIS — Z86.74 HISTORY OF CARDIAC ARREST: Primary | ICD-10-CM

## 2024-04-08 PROCEDURE — 99213 OFFICE O/P EST LOW 20 MIN: CPT | Performed by: INTERNAL MEDICINE

## 2024-04-11 PROBLEM — I16.0 HYPERTENSIVE URGENCY: Status: RESOLVED | Noted: 2022-10-22 | Resolved: 2024-04-11

## 2024-04-11 PROCEDURE — 93000 ELECTROCARDIOGRAM COMPLETE: CPT | Performed by: INTERNAL MEDICINE

## 2024-04-11 NOTE — PROGRESS NOTES
Cardiology Follow Up    Dianna Varghese  1960  [id number]  [facility]  [address]  [place]\[facility] [place] [facility]\"" [id number] [phone]    1. History of cardiac arrest  POCT ECG      2. Primary hypertension              Discussion/Summary:All of her assessed cardiac problems are stable. I have reviewed her medications and made no changes. No cardiac testing is ordered.  RTO 1 year.      Interval History: She has not had any cardiac problems since her last OV on 4/20/2023. She is learning to walk with her b/l LE prostheses. She denies CP, SOB.     LEON 1/27/2023 - EF 60%, LVH, no significant valve problems.          She is presently on Amiodarone 100 mg daily.    /62   lbs      Patient Active Problem List   Diagnosis    Esophageal reflux    DM (diabetes mellitus) type II, controlled, with peripheral vascular disorder ([facility])    Class 3 severe obesity due to excess calories with serious comorbidity and body mass index (BMI) of 40.0 to 44.9 in adult ([facility])    Depression, recurrent (HCC)    Anxiety    Hypertension    Stroke (HCC)    Diabetic peripheral neuropathy (HCC)    Vitamin D deficiency    Systolic murmur    Familial combined hyperlipidemia    Arthritis    Transaminitis    Acute colitis    Moderate protein-calorie malnutrition ([facility])    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 ([facility])    Type 2 diabetes mellitus with hyperglycemia, with long-term current use of insulin (HCC)    Hyperparathyroidism (HCC)    Wound of lower extremity    Non healing left heel wound    Hypokalemia    PAD (peripheral artery disease) ([facility])    Generalized edema due to fluid overload    Anemia    Diabetic ulcer of heel associated with diabetes mellitus due to underlying condition ([facility])    HIT (heparin-induced thrombocytopenia) ([facility])    CVA (cerebral  vascular accident) (HCC)    Mild protein-calorie malnutrition (HCC)    History of CVA (cerebrovascular accident)    Fall    Cardiac arrest (HCC)    Troponin level elevated    Hypomagnesemia    Bacteremia    Osteomyelitis (HCC)    Prolonged Q-T interval on ECG    Thrombocytosis    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)    Weight gain    S/P bilateral above knee amputation (HCC)    Acquired hypothyroidism    History of cardiac arrest    Advance care planning     Past Medical History:   Diagnosis Date    Altered mental status 2023    Anxiety     Depression     Diabetes (HCC)     Diabetes mellitus (HCC)     Diarrhea 2022    Epigastric pain 2022    Esophageal reflux     2015 RESOLVED    Fall 2023    Forehead laceration 2023    Hyperlipidemia     Hypertension     Low back pain     sciatica    Pneumonia 2019    Stroke (HCC) 2015    small vessel, L frontal corona radiata. Rx asa 81, Lipitor 40, risk modification    Vitamin D deficiency      Social History     Socioeconomic History    Marital status: Single     Spouse name: Not on file    Number of children: Not on file    Years of education: Not on file    Highest education level: Not on file   Occupational History    Not on file   Tobacco Use    Smoking status: Former     Current packs/day: 0.00     Types: Cigarettes     Quit date:      Years since quittin.2    Smokeless tobacco: Never   Vaping Use    Vaping status: Never Used   Substance and Sexual Activity    Alcohol use: Never     Comment: OCCASIONAL ALCOHOL USE IN ALL SCRIPTS    Drug use: No    Sexual activity: Not on file   Other Topics Concern    Not on file   Social History Narrative    Not on file     Social Determinants of Health     Financial Resource Strain: Not on file   Food Insecurity: No Food Insecurity (2023)    Hunger Vital Sign     Worried About Running Out of Food in the Last Year: Never true     Ran Out of Food in the Last Year: Never  true   Transportation Needs: Not on file   Physical Activity: Not on file   Stress: Not on file   Social Connections: Not on file   Intimate Partner Violence: Not on file   Housing Stability: Low Risk  (1/19/2023)    Housing Stability Vital Sign     Unable to Pay for Housing in the Last Year: No     Number of Places Lived in the Last Year: 1     Unstable Housing in the Last Year: No      Family History   Problem Relation Age of Onset    Diabetes Mother     Lung cancer Mother     Cancer Father     Lung cancer Father     Hypertension Brother     Hypertension Family      Past Surgical History:   Procedure Laterality Date    AMPUTATION ABOVE KNEE (AKA) Right 12/1/2022    Procedure: (AKA), PSB  BKA;  Surgeon: Johnathan Britton DO;  Location: [facility];  Service: Vascular    ARTERIOGRAM Right 11/7/2022    Procedure: -Right lower extremity angiogram -Right CFA balloon angioplasty with [id number]  [employer]  -Right CFA DCB with 6x40 Bard Lutonix -Right CFA atherectomy with Jetstream and distal embolization protection with 7mm Spider FX -Right SFA/Pop angioplasty with 4x40 Chololate balloon -Right SFA/Pop DCB with 5x150 Bard Lutonix -Right SFA stent with 6x80 [employer] Rosalinda x2 -R    COLONOSCOPY      IR AORTAGRAM WITH RUN-OFF  10/31/2022    IR LOWER EXTREMITY ANGIOGRAM  11/10/2022    IR LOWER EXTREMITY ANGIOGRAM  11/7/2022    NJ AMP LEG THRU TIBIA&FIBULA SEC CLOSURE/SCAR REV Right 1/27/2023    Procedure: AMPUTATION REVISION STUMP;  Surgeon: Johnathan Britton DO;  Location: [facility];  Service: Vascular    NJ AMPUTATION THIGH THROUGH FEMUR ANY LEVEL Left 11/23/2022    Procedure: (AKA);  Surgeon: Silvano Thompson DO;  Location: [facility];  Service: Vascular    NJ NEGATIVE PRESSURE WOUND THERAPY DME <= 50 SQ CM Bilateral 12/19/2022    Procedure: Right above knee amputation washout; vac change; Left above knee amputation debridement; vac placement;  Surgeon: Johnathan Britton DO;  Location: [facility];   Service: Vascular    WOUND DEBRIDEMENT Right 12/16/2022    Procedure: AKA STUMP WASHOUT, Left AKA Staple removal. application of wound vac to Right AKA;  Surgeon: Wan Silva MD;  Location: [facility];  Service: Vascular       Current Outpatient Medications:     acetaminophen (TYLENOL) 325 mg tablet, Take 650 mg by mouth every 4 (four) hours as needed for mild pain, Disp: , Rfl:     acetaminophen (TYLENOL) 650 mg suppository, Insert 650 mg into the rectum every 4 (four) hours as needed for mild pain, Disp: , Rfl:     amiodarone 100 mg tablet, Take 100 mg by mouth daily, Disp: , Rfl:     amLODIPine (NORVASC) 10 mg tablet, TAKE 1 TABLET BY MOUTH EVERY DAY, Disp: 90 tablet, Rfl: 1    apixaban (ELIQUIS) 5 mg, Take 5 mg by mouth 2 (two) times a day, Disp: , Rfl:     aspirin 81 mg chewable tablet, Chew 81 mg daily, Disp: , Rfl:     atorvastatin (LIPITOR) 40 mg tablet, TAKE 1 TABLET BY MOUTH EVERY DAY, Disp: 90 tablet, Rfl: 1    buPROPion (WELLBUTRIN XL) 150 mg 24 hr tablet, Take 1 tablet (150 mg total) by mouth daily, Disp: 30 tablet, Rfl: 0    calcium carbonate (TUMS) 500 mg chewable tablet, Chew 2 tablets every 6 (six) hours as needed for indigestion or heartburn, Disp: , Rfl:     Continuous Blood Gluc  (FreeStyle Noé 14 Day [place]) LITO, Use 1 Units continuous, Disp: 1 each, Rfl: 0    Continuous Blood Gluc Sensor (FreeStyle Noé 14 Day Sensor) MISC, Use daily as directed for CGM - Change every 14 days, Disp: 2 each, Rfl: 1    docusate sodium (COLACE) 100 mg capsule, Take 100 mg by mouth 2 (two) times a day, Disp: , Rfl:     gabapentin (NEURONTIN) 100 mg capsule, TAKE 2 CAPSULES BY MOUTH TWICE A DAY, Disp: 360 capsule, Rfl: 0    hydrALAZINE (APRESOLINE) 100 MG tablet, Take 1 tablet (100 mg total) by mouth every 8 (eight) hours, Disp: , Rfl: 0    hydrochlorothiazide (HYDRODIURIL) 12.5 mg tablet, Take 1 tablet (12.5 mg total) by mouth daily Do not start before February 4, 2023. (Patient taking  differently: Take 25 mg by mouth daily), Disp: , Rfl: 0    insulin glargine (LANTUS) 100 units/mL subcutaneous injection, Inject 20 Units under the skin every morning Do not start before February 4, 2023. (Patient taking differently: Inject 22 Units under the skin every morning), Disp: 10 mL, Rfl: 0    Insulin Pen Needle (B-D UF III MINI PEN NEEDLES) 31G X 5 MM MISC, USE WITH INSULIN FOUR TIMES DAILY, Disp: 200 each, Rfl: 3    Lancets 28G MISC, Use 1 each 4 (four) times a day, Disp: 400 each, Rfl: 0    levothyroxine 50 mcg tablet, Take 50 mcg by mouth daily, Disp: , Rfl:     lisinopril (ZESTRIL) 40 mg tablet, TAKE 1 TABLET BY MOUTH EVERY DAY, Disp: 90 tablet, Rfl: 1    Magnesium Cl-Calcium Carbonate (SLOW MAGNESIUM/CALCIUM PO), Take 1 tablet by mouth daily, Disp: , Rfl:     metoprolol tartrate (LOPRESSOR) 50 mg tablet, Take 1 tablet (50 mg total) by mouth every 12 (twelve) hours, Disp: , Rfl: 0    Multiple Vitamin (multivitamin) tablet, Take 1 tablet by mouth daily, Disp: , Rfl:     polyethylene glycol (MIRALAX) 17 g packet, Take 17 g by mouth daily as needed for constipation, Disp: , Rfl:     QUEtiapine (SEROquel) 25 mg tablet, Take 1 tablet (25 mg total) by mouth daily at bedtime (Patient taking differently: Take 12.5 mg by mouth daily at bedtime), Disp: 30 tablet, Rfl: 0    sertraline (ZOLOFT) 100 mg tablet, TAKE 1 TABLET BY MOUTH EVERY DAY, Disp: 90 tablet, Rfl: 1    thiamine (VITAMIN B1) 100 mg tablet, Take 1 tablet (100 mg total) by mouth daily Do not start before February 4, 2023., Disp: , Rfl: 0    travoprost (TRAVATAN-Z) 0.004 % ophthalmic solution, Administer 1 drop into the left eye daily at bedtime, Disp: , Rfl:     Trulicity 0.75 MG/0.5ML SOPN, INJECT 0.5 ML (0.75 MG TOTAL) UNDER THE SKIN ONCE A WEEK TUESDAY, Disp: 2 mL, Rfl: 1  Allergies   Allergen Reactions    Heparin Other (See Comments)     History of HIT     Vitals:    04/08/24 1136   BP: 100/62   BP Location: Right arm   Patient Position: Sitting    Cuff Size: Large   Pulse: 63   SpO2: 95%   Weight: 90.7 kg (200 lb)     Weight (last 2 days)       None           Blood pressure 100/62, pulse 63, weight 90.7 kg (200 lb), SpO2 95%, not currently breastfeeding., Body mass index is 34.33 kg/m².    Labs:  VBI on 11/18/2023   Component Date Value    EXTERNAL EGFR 03/23/2023 74    VBI on 11/13/2023   Component Date Value    CREATININE 06/05/2023 1.30     EXTERNAL EGFR 06/05/2023 10    VBI on 10/18/2023   Component Date Value    EXTERNAL EGFR 05/15/2023 54      Imaging: No results found.    Review of Systems:  Review of Systems   Constitutional:  Negative for diaphoresis, fatigue, fever and unexpected weight change.   HENT: Negative.     Respiratory:  Negative for cough, shortness of breath and wheezing.    Cardiovascular:  Negative for chest pain, palpitations and leg swelling.   Gastrointestinal:  Negative for abdominal pain, diarrhea and nausea.   Musculoskeletal:  Negative for gait problem and myalgias.   Skin:  Negative for rash.   Neurological:  Negative for dizziness and numbness.   Psychiatric/Behavioral: Negative.         Physical Exam:  Physical Exam  Constitutional:       Appearance: She is well-developed.   HENT:      Head: Normocephalic and atraumatic.   Eyes:      Pupils: Pupils are equal, round, and reactive to light.   Neck:      Vascular: No JVD.   Cardiovascular:      Rate and Rhythm: Regular rhythm.      Pulses: Normal pulses.           Carotid pulses are 2+ on the right side and 2+ on the left side.     Heart sounds: S1 normal and S2 normal.   Pulmonary:      Effort: Pulmonary effort is normal.      Breath sounds: Normal breath sounds. No wheezing or rales.   Abdominal:      General: Bowel sounds are normal.      Palpations: Abdomen is soft.   Musculoskeletal:         General: No tenderness. Normal range of motion.      Cervical back: Normal range of motion and neck supple.   Skin:     General: Skin is warm.   Neurological:      Mental Status: She  is alert and oriented to person, place, and time.      Cranial Nerves: No cranial nerve deficit.      Deep Tendon Reflexes: Reflexes are normal and symmetric.

## 2024-04-12 ENCOUNTER — NURSING HOME VISIT (OUTPATIENT)
Dept: GERIATRICS | Facility: OTHER | Age: 64
End: 2024-04-12
Payer: COMMERCIAL

## 2024-04-12 DIAGNOSIS — R60.1 GENERALIZED EDEMA DUE TO FLUID OVERLOAD: ICD-10-CM

## 2024-04-12 DIAGNOSIS — Z79.4 TYPE 2 DIABETES MELLITUS WITH HYPERGLYCEMIA, WITH LONG-TERM CURRENT USE OF INSULIN (HCC): ICD-10-CM

## 2024-04-12 DIAGNOSIS — E11.65 TYPE 2 DIABETES MELLITUS WITH HYPERGLYCEMIA, WITH LONG-TERM CURRENT USE OF INSULIN (HCC): ICD-10-CM

## 2024-04-12 DIAGNOSIS — E87.70 GENERALIZED EDEMA DUE TO FLUID OVERLOAD: ICD-10-CM

## 2024-04-12 DIAGNOSIS — K21.9 GASTROESOPHAGEAL REFLUX DISEASE WITHOUT ESOPHAGITIS: ICD-10-CM

## 2024-04-12 DIAGNOSIS — R09.89 CHEST CONGESTION: Primary | ICD-10-CM

## 2024-04-12 PROCEDURE — 99308 SBSQ NF CARE LOW MDM 20: CPT

## 2024-04-15 ENCOUNTER — NURSING HOME VISIT (OUTPATIENT)
Dept: GERIATRICS | Facility: OTHER | Age: 64
End: 2024-04-15
Payer: COMMERCIAL

## 2024-04-15 DIAGNOSIS — Z89.612 S/P BILATERAL ABOVE KNEE AMPUTATION (HCC): ICD-10-CM

## 2024-04-15 DIAGNOSIS — R09.89 CHEST CONGESTION: Primary | ICD-10-CM

## 2024-04-15 DIAGNOSIS — Z89.611 S/P BILATERAL ABOVE KNEE AMPUTATION (HCC): ICD-10-CM

## 2024-04-15 PROCEDURE — 99308 SBSQ NF CARE LOW MDM 20: CPT

## 2024-04-16 VITALS
RESPIRATION RATE: 18 BRPM | BODY MASS INDEX: 33.39 KG/M2 | SYSTOLIC BLOOD PRESSURE: 135 MMHG | OXYGEN SATURATION: 96 % | HEART RATE: 71 BPM | DIASTOLIC BLOOD PRESSURE: 71 MMHG | TEMPERATURE: 98.6 F | WEIGHT: 194.5 LBS

## 2024-04-16 VITALS
SYSTOLIC BLOOD PRESSURE: 153 MMHG | HEART RATE: 70 BPM | TEMPERATURE: 98.3 F | RESPIRATION RATE: 18 BRPM | DIASTOLIC BLOOD PRESSURE: 72 MMHG | OXYGEN SATURATION: 97 % | WEIGHT: 194.5 LBS | BODY MASS INDEX: 33.39 KG/M2

## 2024-04-16 PROBLEM — R09.89 CHEST CONGESTION: Status: ACTIVE | Noted: 2024-04-16

## 2024-04-16 RX ORDER — DULAGLUTIDE 1.5 MG/.5ML
1.5 INJECTION, SOLUTION SUBCUTANEOUS
COMMUNITY

## 2024-04-17 ENCOUNTER — NURSING HOME VISIT (OUTPATIENT)
Dept: GERIATRICS | Facility: OTHER | Age: 64
End: 2024-04-17
Payer: COMMERCIAL

## 2024-04-17 DIAGNOSIS — E11.42 DIABETIC PERIPHERAL NEUROPATHY (HCC): ICD-10-CM

## 2024-04-17 DIAGNOSIS — D50.8 IRON DEFICIENCY ANEMIA SECONDARY TO INADEQUATE DIETARY IRON INTAKE: ICD-10-CM

## 2024-04-17 DIAGNOSIS — K21.9 GASTROESOPHAGEAL REFLUX DISEASE WITHOUT ESOPHAGITIS: ICD-10-CM

## 2024-04-17 DIAGNOSIS — E78.49 FAMILIAL COMBINED HYPERLIPIDEMIA: ICD-10-CM

## 2024-04-17 DIAGNOSIS — E03.9 ACQUIRED HYPOTHYROIDISM: ICD-10-CM

## 2024-04-17 DIAGNOSIS — Z89.612 S/P BILATERAL ABOVE KNEE AMPUTATION (HCC): ICD-10-CM

## 2024-04-17 DIAGNOSIS — E11.65 TYPE 2 DIABETES MELLITUS WITH HYPERGLYCEMIA, WITH LONG-TERM CURRENT USE OF INSULIN (HCC): ICD-10-CM

## 2024-04-17 DIAGNOSIS — Z79.4 TYPE 2 DIABETES MELLITUS WITH HYPERGLYCEMIA, WITH LONG-TERM CURRENT USE OF INSULIN (HCC): ICD-10-CM

## 2024-04-17 DIAGNOSIS — J06.9 VIRAL URI: Primary | ICD-10-CM

## 2024-04-17 DIAGNOSIS — R94.31 PROLONGED Q-T INTERVAL ON ECG: ICD-10-CM

## 2024-04-17 DIAGNOSIS — F33.9 DEPRESSION, RECURRENT (HCC): ICD-10-CM

## 2024-04-17 DIAGNOSIS — Z71.89 ADVANCE CARE PLANNING: ICD-10-CM

## 2024-04-17 DIAGNOSIS — Z89.611 S/P BILATERAL ABOVE KNEE AMPUTATION (HCC): ICD-10-CM

## 2024-04-17 DIAGNOSIS — Z86.74 HISTORY OF CARDIAC ARREST: ICD-10-CM

## 2024-04-17 DIAGNOSIS — I73.9 PAD (PERIPHERAL ARTERY DISEASE) (HCC): Chronic | ICD-10-CM

## 2024-04-17 DIAGNOSIS — Z86.73 HISTORY OF CVA (CEREBROVASCULAR ACCIDENT): ICD-10-CM

## 2024-04-17 DIAGNOSIS — E21.3 HYPERPARATHYROIDISM (HCC): ICD-10-CM

## 2024-04-17 DIAGNOSIS — I10 PRIMARY HYPERTENSION: ICD-10-CM

## 2024-04-17 PROBLEM — I63.9 CVA (CEREBRAL VASCULAR ACCIDENT) (HCC): Status: RESOLVED | Noted: 2022-11-13 | Resolved: 2024-04-17

## 2024-04-17 PROBLEM — K52.9 ACUTE COLITIS: Status: RESOLVED | Noted: 2020-01-31 | Resolved: 2024-04-17

## 2024-04-17 PROBLEM — R78.81 BACTEREMIA: Status: RESOLVED | Noted: 2023-01-20 | Resolved: 2024-04-17

## 2024-04-17 PROBLEM — I46.9 CARDIAC ARREST (HCC): Status: RESOLVED | Noted: 2023-01-18 | Resolved: 2024-04-17

## 2024-04-17 PROCEDURE — 99309 SBSQ NF CARE MODERATE MDM 30: CPT | Performed by: STUDENT IN AN ORGANIZED HEALTH CARE EDUCATION/TRAINING PROGRAM

## 2024-04-17 NOTE — ASSESSMENT & PLAN NOTE
Stable, no complaints at this time  Does not seem to be contributing factor to recent sore throat  Encourage to eat meals upright, avoiding laying flat for at least 30 minutes following meals  Avoid trigger foods  Stable off PPI

## 2024-04-17 NOTE — ASSESSMENT & PLAN NOTE
Reports mood stable and well controlled  Psych following  Supportive care  Continue regimen including  Continue buproprion 150 mg po daily  Continue sertraline 100 mg po daily at bedtime  (Was on quetiapine, has been weaned and discontinued as of April 2024 and appears tolerating well without it)

## 2024-04-17 NOTE — ASSESSMENT & PLAN NOTE
Seems to be primary on chart review  Continue multivitamin/vit D  Monitor calcium on routine labs - has been stable/WNL recently  Has followed with Endocrinology, consider updated visit/consultation if issue becomes symptomatic or worsens

## 2024-04-17 NOTE — ASSESSMENT & PLAN NOTE
Does not appear to be with excess fluid contributing to recent chest congestion and wheezing   Monitor symptoms to make sure it is not cardiac in nature

## 2024-04-17 NOTE — ASSESSMENT & PLAN NOTE
Seems fairly mild, stable on lab review  -monitor on routine labs  -borderline microcytic and hx of low iron on lab review - will discuss with team to begin on ferrous sulfate PO qOD  -likely component of CKD as well  -monitor for acute bleed - no present signs  -consider further workup, Heme consult if persistent/worsening  -transfuse PRN Hb <7  -low threshold to hold anticoagulation if evidence of bleed or worsening Hb

## 2024-04-17 NOTE — ASSESSMENT & PLAN NOTE
S/p cardiac arrest on 1/18/2023 considered due to electrolyte abnormalities/severe sepsis  No recent/acute cardiac complaints  Continue cardiac regimen including aspirin, Eliquis, amiodarone, atorvastatin  Follow up with Cardiology as appropriate/yearly; had recent Cardio visit 4/8/24 and considered stable

## 2024-04-17 NOTE — ASSESSMENT & PLAN NOTE
Continued congestion with some wheezes heard on exam today  No shortness of breath at rest  Continue guaifenesin 600 mg po Q12h x 5 days  Duoneb breathing treatments Q8h x 3 days  Assess need to continue after 3 days  Continue to monitor vitals  Encourage incentive spirometry  Out of bed as tolerated   Encourage increased po fluid intake  Vital panel negative

## 2024-04-17 NOTE — ASSESSMENT & PLAN NOTE
Lab Results   Component Value Date    HGBA1C 9.8 (H) 10/25/2022     Patient is s/p bilateral AKA  Neuropathy concern seems controlled/stable on gabapentin 200mg BID  Consider wean if remaining stable/controlled and in setting of bilateral AKA if peripheral neuropathy becomes less of an issue as she heals and gets used to her prosthetics

## 2024-04-17 NOTE — ASSESSMENT & PLAN NOTE
Hemoglobin A1c (01/18/24)  7.7  Requires stricter control  Encourage CCD  Fasting blood glucose checks weekly with parameters to call  Avoid hypoglycemia  Hypoglycemia protocol  Continue Lantus subQ insulin 22 units daily  Trulicity increased to 1.5 mg/0.5mL (1.5 mg) subQ weekly  Follow up HgbA1c Q3 months July/Oct/Jan/April

## 2024-04-17 NOTE — ASSESSMENT & PLAN NOTE
Due to significant PAD and complications after attempted revascularizations resident required bilateral AKA  Left leg done on 11/23/22  Right leg done on 12/01/22  Continue follow up with Vascular as appropriate  Continue PT/OT for bilateral prosthetics

## 2024-04-17 NOTE — ASSESSMENT & PLAN NOTE
ACP was extensively discussed at a prior visit 2/21/24  Briefly revisited today to confirm/evaluate for changes in patient's wishes  Patient confirms HCP as daughter Jailyn Varghese and desire for code status to be kept as Full Code, goals identified as life-prolonging

## 2024-04-17 NOTE — ASSESSMENT & PLAN NOTE
Lab Results   Component Value Date    HGBA1C 9.8 (H) 10/25/2022     Hemoglobin A1c (01/18/24)  7.7  Encourage CCD  Monitor glucose routinely - recent fasting sugars have generally been mid 100s-low 200s  Avoid hypoglycemia  Continue Lantus subQ insulin 22 units daily (consider further increase if sugars remain elevated after recent Trulicity titration)  Trulicity recently increased to 1.5 mg/0.5mL (1.5 mg) subQ weekly  Follow up HgbA1c Q3 months July/Oct/Jan/April

## 2024-04-17 NOTE — ASSESSMENT & PLAN NOTE
Due to significant PAD and complications after attempted revascularizations resident required bilateral AKA  Left leg done on 11/23/22  Right leg done on 12/01/22  Continue follow up with Vascular  Continue PT/OT  Feeling more comfortable with her bilateral prosthetics  Seen working with therapy today  Encourage out of bed as tolerated

## 2024-04-17 NOTE — ASSESSMENT & PLAN NOTE
Monitor BP - recently generally stable 110s-140s systolic  Pulse generally 60s-80s  Avoid hypotension  Continue regimen including amlodipine, lisinopril, metoprolol, HCTZ, hydralazine, amiodarone  Consider weaning/adjusting extensive regimen as appropriate, especially if BP remains well controlled or becomes softer  Following Cardiology outpatient

## 2024-04-17 NOTE — ASSESSMENT & PLAN NOTE
Patient with symptoms consistent with mild viral URI since around 8-9 days  initially she was having sore throat, congestion, and productive cough; over time the symptoms have been gradually improving, the sore throat has fully resolved, the congestion is improving, and the cough is better and mostly dry recently.  Symptoms overall improving  No evidence of worsening, decompensation, respiratory distress, or bacterial pneumonia  No indication for chest imaging at present, could consider if symptoms persist/worsen  Continue supportive care and symptom-directed treatment including OK to trial breathing treatments and cough medication as ordered; discussed with NP  Continue to monitor

## 2024-04-17 NOTE — PROGRESS NOTES
[address], [address], [place] [address]  [phone]    NAME: Dianna Varghese  AGE: 63 y.o. SEX: female    Progress Note    Location: [facility]  POS: 32 ([facility])  Code Status: Full Code    Chief complaint / Reason for visit:  Acute Visit    Assessment/Plan:    Chest congestion  Noted congestion with some wheezes heard on exam today  No shortness of breath at rest  Started guaifenesin 600 mg po Q12h x 5 days  Duoneb breathing treatments Q12h prn x 7 days  Continue to monitor vitals  Encourage incentive spirometry  Out of bed as tolerated   Encourage increased po fluid intake  Vital panel STAT    Esophageal reflux  Stable, no complaints at this time  Does not seem to be contributing factor to recent sore throat  Encourage to eat meals upright, avoiding laying flat for at least 30 minutes following meals  Avoid trigger foods  Stable off PPI    Type 2 diabetes mellitus with hyperglycemia, with long-term current use of insulin ([facility])  Hemoglobin A1c (01/18/24)  7.7  Requires stricter control  Encourage CCD  Fasting blood glucose checks weekly with parameters to call  Avoid hypoglycemia  Hypoglycemia protocol  Continue Lantus subQ insulin 22 units daily  Trulicity increased to 1.5 mg/0.5mL (1.5 mg) subQ weekly  Follow up HgbA1c Q3 months July/Oct/Jan/April    Generalized edema due to fluid overload  Does not appear to be with excess fluid contributing to recent chest congestion and wheezing   Monitor symptoms to make sure it is not cardiac in nature       This is a 63 y.o. female seen today at [facility].  Medical history includes, not limited to, bilateral above knee amputations, cardiac arrest, type 2 DM, TIA, major depressive disorder, peripheral vascular disease, hypertension, hyperlipidemia, and GERD.    Resident seen today for an acute visit.  Nursing staff reporting resident with congestion and generally not feel well. Upon entering the room she is resting in  bed.  She states her symptoms started this past week.  She has some congestion, states she is coughing and needing to blow her nose.  She states that she was not feeling well and did not want to participate with therapy today.  Has some shortness of breath when she is coughing. Sore/dry throat.  No redness noted on her throat on exam.        Reviewed resident with nursing staff.  New orders placed.  Reviewed recent vitals.           Nursing and prior provider notes reviewed on this visit. Discussed visit with PCP and nursing staff/ supervisor.      Review of Systems   Constitutional:  Positive for activity change. Negative for appetite change, fatigue, fever and unexpected weight change.   HENT:  Positive for congestion, rhinorrhea and sore throat.    Eyes:  Negative for pain, discharge and visual disturbance.   Respiratory:  Positive for cough and wheezing. Negative for shortness of breath.    Cardiovascular:  Negative for chest pain and palpitations.   Gastrointestinal:  Negative for abdominal pain, constipation and diarrhea.   Genitourinary:  Negative for difficulty urinating, dysuria, hematuria and urgency.   Musculoskeletal:  Positive for gait problem. Negative for arthralgias.   Neurological:  Negative for syncope and weakness.   Psychiatric/Behavioral:  Negative for confusion and sleep disturbance.    All other systems reviewed and are negative.         ALLERGY: Reviewed, unchanged  Allergies   Allergen Reactions    Heparin Other (See Comments)     History of HIT       HISTORY:  Medical Hx: Reviewed, unchanged  Family Hx: Reviewed, unchanged  Soc Hx: Reviewed,  unchanged      Physical Exam  Vitals reviewed.   Constitutional:       General: She is not in acute distress.     Appearance: Normal appearance. She is not ill-appearing.   HENT:      Head: Normocephalic.      Right Ear: Tympanic membrane normal.      Left Ear: Tympanic membrane normal.      Nose: Congestion and rhinorrhea present.      Mouth/Throat:       Mouth: Mucous membranes are moist.      Pharynx: Oropharynx is clear.   Eyes:      General:         Right eye: No discharge.         Left eye: No discharge.      Extraocular Movements: Extraocular movements intact.      Conjunctiva/sclera: Conjunctivae normal.      Pupils: Pupils are equal, round, and reactive to light.   Cardiovascular:      Rate and Rhythm: Normal rate and regular rhythm.      Pulses: Normal pulses.      Heart sounds: Normal heart sounds.   Pulmonary:      Effort: Pulmonary effort is normal. No respiratory distress.      Breath sounds: Wheezing present.   Chest:      Chest wall: No tenderness.   Abdominal:      General: Bowel sounds are normal.      Palpations: Abdomen is soft.      Tenderness: There is no abdominal tenderness.   Musculoskeletal:         General: Deformity present. No swelling. Normal range of motion.      Cervical back: Normal range of motion.      Right lower leg: No edema.      Left lower leg: No edema.      Comments: Bilateral AKA   Skin:     General: Skin is warm and dry.   Neurological:      Mental Status: She is alert and oriented to person, place, and time. Mental status is at baseline.      Motor: No weakness.   Psychiatric:         Mood and Affect: Mood normal.         Behavior: Behavior normal.         Thought Content: Thought content normal.         Judgment: Judgment normal.            Laboratory results / Imaging reviewed: Hard copy/ies in medical chart:    Vitals:    04/12/24 2200   BP: 135/71   Pulse: 71   Resp: 18   Temp: 98.6 °F (37 °C)   SpO2: 96%       Current Medications:  All medications reviewed and updated in Nursing Home Chart    Please note: This note was completed in part utilizing a voice-recognition software may have been used in the preparation of this document. Grammatical errors, random word insertion, spelling mistakes, and incomplete sentences may be an occasional consequence of the system secondary to software limitations, ambient noise and  "hardware issues. Occasional wrong word or \"sound-alike\" substitutions may have occurred due to the inherent limitations of voice recognition software. At the time of dictation, efforts were made to edit, clarify and/or correct errors. Interpretation should be guided by context. Please read the chart carefully and recognize, using context, where substitutions have occurred. If you have any questions or concerns about the context, text or information contained within the body of this dictation, please contact myself, the provider, for further clarification.      ALEX Luevano  4/16/2024  "

## 2024-04-17 NOTE — ASSESSMENT & PLAN NOTE
TSH elevated recently  Started on levothyroxine 25mcg in Jan 2024, titrated to 50mcg in March 2024 due to persistent TSH elevation although improving on recheck  No apparent acute/associated symptoms   Monitor TSH periodically; Adjust dosing as appropriate  Consider Endocrine consult if persistently abnormal or symptoms develop

## 2024-04-17 NOTE — PROGRESS NOTES
[address], [address], [place] [address]  [phone]    NAME: Dianna Varghese  AGE: 63 y.o. SEX: female    Progress Note    Location: [facility]  POS: 32 ([facility])  Code Status: Full Code    Chief complaint / Reason for visit: Acute Visit    Assessment/Plan:    Chest congestion  Continued congestion with some wheezes heard on exam today  No shortness of breath at rest  Continue guaifenesin 600 mg po Q12h x 5 days  Duoneb breathing treatments Q8h x 3 days  Assess need to continue after 3 days  Continue to monitor vitals  Encourage incentive spirometry  Out of bed as tolerated   Encourage increased po fluid intake  Vital panel negative    S/P bilateral above knee amputation (HCC)  Due to significant PAD and complications after attempted revascularizations resident required bilateral AKA  Left leg done on 11/23/22  Right leg done on 12/01/22  Continue follow up with Vascular  Continue PT/OT  Feeling more comfortable with her bilateral prosthetics  Seen working with therapy today  Encourage out of bed as tolerated       This is a 63 y.o. female seen today at [facility].  Medical history includes, not limited to, bilateral above knee amputations, cardiac arrest, type 2 DM, TIA, major depressive disorder, peripheral vascular disease, hypertension, hyperlipidemia, and GERD.    Resident seen today for a follow up on an acute visit.  She was recently seen for reports of cough, congestion, wheezes.  She is seen today laying in bed comfortable.  She states her symptoms have shown little improvement over the weekend.  She states she still has some congestion.      Reviewed viral/respiratory panel, results were all negative.      Reviewed resident with nursing staff.  New orders placed for scheduled breathing treatments.  Reviewed recent vitals.  Encourage use of incentive spirometer.       Spoke with respiratory therapist, updated on status of resident.           Nursing and  prior provider notes reviewed on this visit. Discussed visit with PCP and nursing staff/ supervisor.      Review of Systems   Constitutional:  Positive for activity change. Negative for appetite change, fatigue, fever and unexpected weight change.   HENT:  Positive for congestion, rhinorrhea and sore throat.    Eyes:  Negative for pain, discharge and visual disturbance.   Respiratory:  Positive for cough and wheezing. Negative for shortness of breath.    Cardiovascular:  Negative for chest pain and palpitations.   Gastrointestinal:  Negative for abdominal pain, constipation and diarrhea.   Genitourinary:  Negative for difficulty urinating, dysuria, hematuria and urgency.   Musculoskeletal:  Positive for gait problem. Negative for arthralgias.   Neurological:  Negative for syncope and weakness.   Psychiatric/Behavioral:  Negative for confusion and sleep disturbance.    All other systems reviewed and are negative.         ALLERGY: Reviewed, unchanged  Allergies   Allergen Reactions    Heparin Other (See Comments)     History of HIT       HISTORY:  Medical Hx: Reviewed, unchanged  Family Hx: Reviewed, unchanged  Soc Hx: Reviewed,  unchanged      Physical Exam  Vitals reviewed.   Constitutional:       General: She is not in acute distress.     Appearance: Normal appearance. She is not ill-appearing.   HENT:      Head: Normocephalic.      Right Ear: Tympanic membrane normal.      Left Ear: Tympanic membrane normal.      Nose: Congestion and rhinorrhea present.      Mouth/Throat:      Mouth: Mucous membranes are moist.      Pharynx: Oropharynx is clear.   Eyes:      General:         Right eye: No discharge.         Left eye: No discharge.      Extraocular Movements: Extraocular movements intact.      Conjunctiva/sclera: Conjunctivae normal.      Pupils: Pupils are equal, round, and reactive to light.   Cardiovascular:      Rate and Rhythm: Normal rate and regular rhythm.      Pulses: Normal pulses.      Heart sounds:  "Normal heart sounds.   Pulmonary:      Effort: Pulmonary effort is normal. No respiratory distress.      Breath sounds: Wheezing present.   Chest:      Chest wall: No tenderness.   Abdominal:      General: Bowel sounds are normal.      Palpations: Abdomen is soft.      Tenderness: There is no abdominal tenderness.   Musculoskeletal:         General: Deformity present. No swelling. Normal range of motion.      Cervical back: Normal range of motion.      Right lower leg: No edema.      Left lower leg: No edema.      Comments: Bilateral AKA   Skin:     General: Skin is warm and dry.   Neurological:      Mental Status: She is alert and oriented to person, place, and time. Mental status is at baseline.      Motor: No weakness.   Psychiatric:         Mood and Affect: Mood normal.         Behavior: Behavior normal.         Thought Content: Thought content normal.         Judgment: Judgment normal.            Laboratory results / Imaging reviewed: Hard copy/ies in medical chart:    Vitals:    04/15/24 2225   BP: 153/72   Pulse: 70   Resp: 18   Temp: 98.3 °F (36.8 °C)   SpO2: 97%       Current Medications:  All medications reviewed and updated in Nursing Home Chart    Please note: This note was completed in part utilizing a voice-recognition software may have been used in the preparation of this document. Grammatical errors, random word insertion, spelling mistakes, and incomplete sentences may be an occasional consequence of the system secondary to software limitations, ambient noise and hardware issues. Occasional wrong word or \"sound-alike\" substitutions may have occurred due to the inherent limitations of voice recognition software. At the time of dictation, efforts were made to edit, clarify and/or correct errors. Interpretation should be guided by context. Please read the chart carefully and recognize, using context, where substitutions have occurred. If you have any questions or concerns about the context, text or " information contained within the body of this dictation, please contact myself, the provider, for further clarification.      ALEX Luevano  4/16/2024

## 2024-04-17 NOTE — ASSESSMENT & PLAN NOTE
S/p left AKA on 11/23/22  S/p right AKA stump revision on 01/27/23  Continue aspirin, statin, Eliquis  Working with rehab regarding prosthetics, PT/OT as appropriate  Follow up with Vascular as appropriate

## 2024-04-17 NOTE — ASSESSMENT & PLAN NOTE
Noted congestion with some wheezes heard on exam today  No shortness of breath at rest  Started guaifenesin 600 mg po Q12h x 5 days  Duoneb breathing treatments Q12h prn x 7 days  Continue to monitor vitals  Encourage incentive spirometry  Out of bed as tolerated   Encourage increased po fluid intake  Vital panel STAT

## 2024-05-07 ENCOUNTER — NURSING HOME VISIT (OUTPATIENT)
Dept: GERIATRICS | Facility: OTHER | Age: 64
End: 2024-05-07
Payer: COMMERCIAL

## 2024-05-07 DIAGNOSIS — E11.65 TYPE 2 DIABETES MELLITUS WITH HYPERGLYCEMIA, WITH LONG-TERM CURRENT USE OF INSULIN (HCC): ICD-10-CM

## 2024-05-07 DIAGNOSIS — Z79.4 TYPE 2 DIABETES MELLITUS WITH HYPERGLYCEMIA, WITH LONG-TERM CURRENT USE OF INSULIN (HCC): ICD-10-CM

## 2024-05-07 DIAGNOSIS — E83.42 HYPOMAGNESEMIA: ICD-10-CM

## 2024-05-07 DIAGNOSIS — R19.7 DIARRHEA, UNSPECIFIED TYPE: Primary | ICD-10-CM

## 2024-05-07 PROCEDURE — 99308 SBSQ NF CARE LOW MDM 20: CPT

## 2024-05-13 VITALS
RESPIRATION RATE: 20 BRPM | DIASTOLIC BLOOD PRESSURE: 55 MMHG | OXYGEN SATURATION: 97 % | HEART RATE: 74 BPM | WEIGHT: 198 LBS | TEMPERATURE: 98 F | SYSTOLIC BLOOD PRESSURE: 139 MMHG | BODY MASS INDEX: 33.99 KG/M2

## 2024-05-13 RX ORDER — SACCHAROMYCES BOULARDII 250 MG
250 CAPSULE ORAL 2 TIMES DAILY
COMMUNITY
Start: 2024-05-13 | End: 2024-05-20

## 2024-05-13 NOTE — ASSESSMENT & PLAN NOTE
Loose bowels starting over the weekend  No fevers noted  Denies N/V  Adjustments made to bowel regimen   Lab work ordered to monitor electrolytes and magnesium   Encouraged to maintain fluids as tolerated

## 2024-05-13 NOTE — PROGRESS NOTES
[address], [address], [place] [address]  [phone]    NAME: Dianna Varghese  AGE: 63 y.o. SEX: female    Progress Note    Location: [facility]  POS: 32 ([facility])  Code Status: Full Code    Chief complaint / Reason for visit: Acute Visit    Assessment/Plan:    Hypomagnesemia  History of low magnesium  Currently on slow mag  Holding in setting of recent diarrhea   Check of magnesium on 05/09    Type 2 diabetes mellitus with hyperglycemia, with long-term current use of insulin ([facility])  Hemoglobin A1c (04/18/24)  9.3  Requires stricter control  Encourage CCD  Fasting blood glucose checks weekly with parameters to call  Avoid hypoglycemia  Hypoglycemia protocol  Continue Lantus subQ insulin 22 units daily  Trulicity increased to 1.5 mg/0.5mL (1.5 mg) subQ weekly  Follow up HgbA1c Q3 months July/Oct/Jan/April    Diarrhea, unspecified  Loose bowels starting over the weekend  No fevers noted  Denies N/V  Adjustments made to bowel regimen   Lab work ordered to monitor electrolytes and magnesium   Encouraged to maintain fluids as tolerated         This is a 63 y.o. female seen today at [facility].  Medical history includes, not limited to, bilateral above knee amputations, cardiac arrest, type 2 DM, TIA, major depressive disorder, peripheral vascular disease, hypertension, hyperlipidemia, and GERD.    Resident seen today for an acute visit.  She has recently started with loose bowels starting over the weekend.  She states she has a decreased appetite.  Is not feverish, no complaints of pain.  Did not want to participate with therapy due to bowel issues.      Reviewed resident with nursing staff.  New orders placed.             Nursing and prior provider notes reviewed on this visit. Discussed visit with PCP and nursing staff/ supervisor.      Review of Systems   Constitutional:  Positive for activity change. Negative for appetite change, fatigue, fever and unexpected weight  change.   Eyes:  Negative for pain, discharge and visual disturbance.   Respiratory:  Negative for shortness of breath.    Cardiovascular:  Negative for chest pain and palpitations.   Gastrointestinal:  Positive for diarrhea. Negative for abdominal pain and constipation.   Genitourinary:  Negative for difficulty urinating, dysuria, hematuria and urgency.   Musculoskeletal:  Positive for gait problem. Negative for arthralgias.   Neurological:  Negative for syncope and weakness.   Psychiatric/Behavioral:  Negative for confusion and sleep disturbance.    All other systems reviewed and are negative.         ALLERGY: Reviewed, unchanged  Allergies   Allergen Reactions    Heparin Other (See Comments)     History of HIT       HISTORY:  Medical Hx: Reviewed, unchanged  Family Hx: Reviewed, unchanged  Soc Hx: Reviewed,  unchanged      Physical Exam  Vitals reviewed.   Constitutional:       General: She is not in acute distress.     Appearance: Normal appearance. She is not ill-appearing.   HENT:      Head: Normocephalic.      Right Ear: Tympanic membrane normal.      Left Ear: Tympanic membrane normal.      Mouth/Throat:      Mouth: Mucous membranes are moist.      Pharynx: Oropharynx is clear.   Eyes:      General:         Right eye: No discharge.         Left eye: No discharge.      Extraocular Movements: Extraocular movements intact.      Conjunctiva/sclera: Conjunctivae normal.      Pupils: Pupils are equal, round, and reactive to light.   Cardiovascular:      Rate and Rhythm: Normal rate and regular rhythm.      Pulses: Normal pulses.      Heart sounds: Normal heart sounds.   Pulmonary:      Effort: Pulmonary effort is normal. No respiratory distress.   Chest:      Chest wall: No tenderness.   Abdominal:      General: Bowel sounds are normal.      Palpations: Abdomen is soft.      Tenderness: There is no abdominal tenderness.   Musculoskeletal:         General: Deformity present. No swelling. Normal range of motion.      " Cervical back: Normal range of motion.      Right lower leg: No edema.      Left lower leg: No edema.      Comments: Bilateral AKA   Skin:     General: Skin is warm and dry.   Neurological:      Mental Status: She is alert and oriented to person, place, and time. Mental status is at baseline.      Motor: No weakness.   Psychiatric:         Mood and Affect: Mood normal.         Behavior: Behavior normal.         Thought Content: Thought content normal.         Judgment: Judgment normal.            Laboratory results / Imaging reviewed: Hard copy/ies in medical chart:    Vitals:    05/13/24 1714   BP: 139/55   Pulse: 74   Resp: 20   Temp: 98 °F (36.7 °C)   SpO2: 97%       Current Medications:  All medications reviewed and updated in Nursing Home Chart    Please note: This note was completed in part utilizing a voice-recognition software may have been used in the preparation of this document. Grammatical errors, random word insertion, spelling mistakes, and incomplete sentences may be an occasional consequence of the system secondary to software limitations, ambient noise and hardware issues. Occasional wrong word or \"sound-alike\" substitutions may have occurred due to the inherent limitations of voice recognition software. At the time of dictation, efforts were made to edit, clarify and/or correct errors. Interpretation should be guided by context. Please read the chart carefully and recognize, using context, where substitutions have occurred. If you have any questions or concerns about the context, text or information contained within the body of this dictation, please contact myself, the provider, for further clarification.      AELX Luevano  5/13/2024  "

## 2024-05-13 NOTE — ASSESSMENT & PLAN NOTE
Hemoglobin A1c (04/18/24)  9.3  Requires stricter control  Encourage CCD  Fasting blood glucose checks weekly with parameters to call  Avoid hypoglycemia  Hypoglycemia protocol  Continue Lantus subQ insulin 22 units daily  Trulicity increased to 1.5 mg/0.5mL (1.5 mg) subQ weekly  Follow up HgbA1c Q3 months July/Oct/Jan/April

## 2024-05-13 NOTE — ASSESSMENT & PLAN NOTE
History of low magnesium  Currently on slow mag  Holding in setting of recent diarrhea   Check of magnesium on 05/09

## 2024-05-14 ENCOUNTER — NURSING HOME VISIT (OUTPATIENT)
Dept: GERIATRICS | Facility: OTHER | Age: 64
End: 2024-05-14
Payer: COMMERCIAL

## 2024-05-14 DIAGNOSIS — R19.7 DIARRHEA, UNSPECIFIED TYPE: Primary | ICD-10-CM

## 2024-05-14 DIAGNOSIS — K21.9 GASTROESOPHAGEAL REFLUX DISEASE WITHOUT ESOPHAGITIS: ICD-10-CM

## 2024-05-14 DIAGNOSIS — Z79.4 TYPE 2 DIABETES MELLITUS WITH HYPERGLYCEMIA, WITH LONG-TERM CURRENT USE OF INSULIN (HCC): ICD-10-CM

## 2024-05-14 DIAGNOSIS — E11.65 TYPE 2 DIABETES MELLITUS WITH HYPERGLYCEMIA, WITH LONG-TERM CURRENT USE OF INSULIN (HCC): ICD-10-CM

## 2024-05-14 PROCEDURE — 99308 SBSQ NF CARE LOW MDM 20: CPT

## 2024-05-16 ENCOUNTER — NURSING HOME VISIT (OUTPATIENT)
Dept: GERIATRICS | Facility: OTHER | Age: 64
End: 2024-05-16
Payer: COMMERCIAL

## 2024-05-16 DIAGNOSIS — K21.9 GASTROESOPHAGEAL REFLUX DISEASE WITHOUT ESOPHAGITIS: ICD-10-CM

## 2024-05-16 DIAGNOSIS — E87.6 HYPOKALEMIA: ICD-10-CM

## 2024-05-16 DIAGNOSIS — R19.7 DIARRHEA, UNSPECIFIED TYPE: ICD-10-CM

## 2024-05-16 DIAGNOSIS — E11.65 TYPE 2 DIABETES MELLITUS WITH HYPERGLYCEMIA, WITH LONG-TERM CURRENT USE OF INSULIN (HCC): Primary | ICD-10-CM

## 2024-05-16 DIAGNOSIS — Z89.612 S/P BILATERAL ABOVE KNEE AMPUTATION (HCC): ICD-10-CM

## 2024-05-16 DIAGNOSIS — Z79.4 TYPE 2 DIABETES MELLITUS WITH HYPERGLYCEMIA, WITH LONG-TERM CURRENT USE OF INSULIN (HCC): Primary | ICD-10-CM

## 2024-05-16 DIAGNOSIS — E03.9 ACQUIRED HYPOTHYROIDISM: ICD-10-CM

## 2024-05-16 DIAGNOSIS — I10 PRIMARY HYPERTENSION: ICD-10-CM

## 2024-05-16 DIAGNOSIS — Z89.611 S/P BILATERAL ABOVE KNEE AMPUTATION (HCC): ICD-10-CM

## 2024-05-16 DIAGNOSIS — F33.9 DEPRESSION, RECURRENT (HCC): ICD-10-CM

## 2024-05-16 PROCEDURE — 99309 SBSQ NF CARE MODERATE MDM 30: CPT

## 2024-05-17 PROBLEM — J06.9 VIRAL URI: Status: RESOLVED | Noted: 2020-02-06 | Resolved: 2024-05-17

## 2024-05-23 VITALS
OXYGEN SATURATION: 97 % | DIASTOLIC BLOOD PRESSURE: 55 MMHG | HEART RATE: 72 BPM | SYSTOLIC BLOOD PRESSURE: 136 MMHG | WEIGHT: 198 LBS | BODY MASS INDEX: 33.99 KG/M2 | TEMPERATURE: 97 F | RESPIRATION RATE: 19 BRPM

## 2024-05-23 VITALS
SYSTOLIC BLOOD PRESSURE: 136 MMHG | HEART RATE: 72 BPM | BODY MASS INDEX: 33.99 KG/M2 | DIASTOLIC BLOOD PRESSURE: 55 MMHG | RESPIRATION RATE: 19 BRPM | OXYGEN SATURATION: 97 % | TEMPERATURE: 97 F | WEIGHT: 198 LBS

## 2024-05-23 RX ORDER — PANTOPRAZOLE SODIUM 20 MG/1
20 TABLET, DELAYED RELEASE ORAL DAILY
COMMUNITY

## 2024-05-23 RX ORDER — FLUTICASONE PROPIONATE 50 MCG
1 SPRAY, SUSPENSION (ML) NASAL EVERY 12 HOURS
COMMUNITY
End: 2024-05-30

## 2024-05-24 NOTE — ASSESSMENT & PLAN NOTE
Hemoglobin A1c (04/18/24)  9.3  Continue Lantus subQ insulin 22 units daily  Trulicity increased to 1.5 mg/0.5mL (1.5 mg) subQ weekly  Blood sugars have remained under control throughout recent gastroenteritis   Continue to  monitor closely through illness   Avoid hypoglycemia  Hypoglycemia protocol  Follow up HgbA1c Q3 months July/Oct/Jan/April

## 2024-05-24 NOTE — ASSESSMENT & PLAN NOTE
Continues to monitor loose bowels  States she has been having 1-2 per day  Continue florastor   Start banatrol flakes   No fevers noted  Denies N/V  Continue to hold bowel regimen medications and slow mag  KUB negative for obstruction  No abdominal distention   Encouraged to maintain fluids as tolerated   Continue to periodically monitor blood work as WBC have been trended up

## 2024-05-24 NOTE — ASSESSMENT & PLAN NOTE
Review of blood pressures from facility are stable  Encourage medication adherence   Continue weekly blood pressure checks  Encourage heart healthy diet  Continue amlodipine 10 mg po daily  Continue lisinopril 40 mg po daily  Continue metoprolol tartrate 50 mg po Q12h  Continue hydralazine 100 mg po Q8h  Continue hydrochlorothiazide 25 mg po daily  Avoid hypotension  Follow up with cardiology

## 2024-05-24 NOTE — PROGRESS NOTES
[address], [address], [place] [address]  [phone]    NAME: Dianna Varghese  AGE: 63 y.o. SEX: female    Progress Note    Location: [facility]  POS: 32 ([facility])  Code Status: Full Code    Chief complaint / Reason for visit: Acute Visit    Assessment/Plan:    Esophageal reflux  Recent increase in GERD symptoms  Complaints of heartburn  Has had to utilize TUMS  Trial of pepcid had failed  Pepcid changed for pantoprazole  Encourage to eat meals upright, avoiding laying flat for at least 30 minutes following meals  Avoid trigger foods    Type 2 diabetes mellitus with hyperglycemia, with long-term current use of insulin ([facility])  Hemoglobin A1c (04/18/24)  9.3  Continue Lantus subQ insulin 22 units daily  Trulicity increased to 1.5 mg/0.5mL (1.5 mg) subQ weekly  Blood sugars have remained under control throughout recent gastroenteritis   Continue to  monitor closely through illness   Avoid hypoglycemia  Hypoglycemia protocol  Follow up HgbA1c Q3 months July/Oct/Jan/April    Diarrhea, unspecified  Continues to have loose bowels  Starting florastor   No fevers noted  Denies N/V  Continue to hold bowel regimen medications and slow mag  KUB negative for obstruction  No abdominal distention   Encouraged to maintain fluids as tolerated   Continue to periodically monitor blood work as WBC have been trended up        This is a 63 y.o. female seen today at [facility].  Medical history includes, not limited to, bilateral above knee amputations, cardiac arrest, type 2 DM, TIA, major depressive disorder, peripheral vascular disease, hypertension, hyperlipidemia, and GERD.    Resident seen today for a follow up on an acute visit.  She continues to have loose bowels.  Recent lab work has shown an increase in her WBC.  She denies fevers, chills.  She does state that she has been having more GERD symptoms.  She states symptoms appear right before she has to have a bowel movement.       Reviewed resident with nursing staff.  New orders placed.             Nursing and prior provider notes reviewed on this visit. Discussed visit with PCP and nursing staff/ supervisor.      Review of Systems   Constitutional:  Positive for activity change. Negative for appetite change, fatigue, fever and unexpected weight change.   Eyes:  Negative for pain, discharge and visual disturbance.   Respiratory:  Negative for shortness of breath.    Cardiovascular:  Negative for chest pain and palpitations.   Gastrointestinal:  Positive for diarrhea. Negative for abdominal pain and constipation.   Genitourinary:  Negative for difficulty urinating, dysuria, hematuria and urgency.   Musculoskeletal:  Positive for gait problem. Negative for arthralgias.   Neurological:  Negative for syncope and weakness.   Psychiatric/Behavioral:  Negative for confusion and sleep disturbance.    All other systems reviewed and are negative.         ALLERGY: Reviewed, unchanged  Allergies   Allergen Reactions    Heparin Other (See Comments)     History of HIT       HISTORY:  Medical Hx: Reviewed, unchanged  Family Hx: Reviewed, unchanged  Soc Hx: Reviewed,  unchanged      Physical Exam  Vitals reviewed.   Constitutional:       General: She is not in acute distress.     Appearance: Normal appearance. She is not ill-appearing.   HENT:      Head: Normocephalic.      Right Ear: Tympanic membrane normal.      Left Ear: Tympanic membrane normal.      Mouth/Throat:      Mouth: Mucous membranes are moist.      Pharynx: Oropharynx is clear.   Eyes:      General:         Right eye: No discharge.         Left eye: No discharge.      Extraocular Movements: Extraocular movements intact.      Conjunctiva/sclera: Conjunctivae normal.      Pupils: Pupils are equal, round, and reactive to light.   Cardiovascular:      Rate and Rhythm: Normal rate and regular rhythm.      Pulses: Normal pulses.      Heart sounds: Normal heart sounds.   Pulmonary:      Effort:  "Pulmonary effort is normal. No respiratory distress.   Chest:      Chest wall: No tenderness.   Abdominal:      General: Bowel sounds are normal.      Palpations: Abdomen is soft.      Tenderness: There is no abdominal tenderness.   Musculoskeletal:         General: Deformity present. No swelling. Normal range of motion.      Cervical back: Normal range of motion.      Right lower leg: No edema.      Left lower leg: No edema.      Comments: Bilateral AKA   Skin:     General: Skin is warm and dry.   Neurological:      Mental Status: She is alert and oriented to person, place, and time. Mental status is at baseline.      Motor: No weakness.   Psychiatric:         Mood and Affect: Mood normal.         Behavior: Behavior normal.         Thought Content: Thought content normal.         Judgment: Judgment normal.            Laboratory results / Imaging reviewed: Hard copy/ies in medical chart:    Vitals:    05/23/24 1955   BP: 136/55   Pulse: 72   Resp: 19   Temp: (!) 97 °F (36.1 °C)   SpO2: 97%       Current Medications:  All medications reviewed and updated in Nursing Home Chart    Please note: This note was completed in part utilizing a voice-recognition software may have been used in the preparation of this document. Grammatical errors, random word insertion, spelling mistakes, and incomplete sentences may be an occasional consequence of the system secondary to software limitations, ambient noise and hardware issues. Occasional wrong word or \"sound-alike\" substitutions may have occurred due to the inherent limitations of voice recognition software. At the time of dictation, efforts were made to edit, clarify and/or correct errors. Interpretation should be guided by context. Please read the chart carefully and recognize, using context, where substitutions have occurred. If you have any questions or concerns about the context, text or information contained within the body of this dictation, please contact myself, the " provider, for further clarification.      ALEX Luevano  5/23/2024

## 2024-05-24 NOTE — PROGRESS NOTES
[address], [address], [place] [address]  [phone]    NAME: Dianna Varghese  AGE: 63 y.o. SEX: female    Progress Note    Location: [facility]  POS: 32 ([facility])  Code Status: Full Code    Chief complaint / Reason for visit: Follow up Visit    Assessment/Plan:    Diarrhea, unspecified  Continues to monitor loose bowels  States she has been having 1-2 per day  Continue florastor   Start banatrol flakes   No fevers noted  Denies N/V  Continue to hold bowel regimen medications and slow mag  KUB negative for obstruction  No abdominal distention   Encouraged to maintain fluids as tolerated   Continue to periodically monitor blood work as WBC have been trended up    Hypokalemia  Potassium was 4.3 most recently on 05/15  Continue to monitor in setting of current loose bowels  Replete as necessary     S/P bilateral above knee amputation (HCC)  Due to significant PAD and complications after attempted revascularizations resident required bilateral AKA  Left leg done on 11/23/22  Right leg done on 12/01/22  Continue follow up with Vascular  Continue PT/OT  Feeling more comfortable with her bilateral prosthetics  Seen working with therapy today  Encourage out of bed as tolerated     Depression, recurrent (HCC)  stable  Resident followed by psychiatry   Continue buproprion 150 mg po daily  Continue sertraline 100 mg po daily at bedtime  Continue quetiapine 12.5 mg po daily at bedtime  Recently decreased from 25 mg  Monitor GDR of medication  Continue to monitor mood  Continue to provide 24/7 supportive care    Acquired hypothyroidism  TSH was recently elevated on routine labs  Levothyroxine increased to 50 mcg po daily on 03/07  TSH on 04/18  4.18  Continue to monitor Q6 months     Type 2 diabetes mellitus with hyperglycemia, with long-term current use of insulin ([facility])  Hemoglobin A1c (04/18/24)  9.3  Continue Lantus subQ insulin 22 units daily  Trulicity increased to 1.5 mg/0.5mL  (1.5 mg) subQ weekly  Blood sugars have remained under control throughout recent gastroenteritis   Continue to  monitor closely through illness   Avoid hypoglycemia  Hypoglycemia protocol  Follow up HgbA1c Q3 months July/Oct/Jan/April    Esophageal reflux  Recent increase in GERD symptoms  Complaints of heartburn  Has had to utilize TUMS  Trial of pepcid had failed  Pepcid changed for pantoprazole  Encourage to eat meals upright, avoiding laying flat for at least 30 minutes following meals  Avoid trigger foods    Hypertension  Review of blood pressures from facility are stable  Encourage medication adherence   Continue weekly blood pressure checks  Encourage heart healthy diet  Continue amlodipine 10 mg po daily  Continue lisinopril 40 mg po daily  Continue metoprolol tartrate 50 mg po Q12h  Continue hydralazine 100 mg po Q8h  Continue hydrochlorothiazide 25 mg po daily  Avoid hypotension  Follow up with cardiology        This is a 63 y.o. female seen today at [facility].  Medical history includes, not limited to, bilateral above knee amputations, cardiac arrest, type 2 DM, TIA, major depressive disorder, peripheral vascular disease, hypertension, hyperlipidemia, and GERD.    Resident seen today for a routine follow up visit.  Upon entering the room she is laying in bed comfortable.  An ongoing issues are her loose bowels which she has been followed for.  She denies N/V.  Denies chills.  Has been receiving periodic blood work to monitor her WBC.  She states she has been having post-nasal drip and would like a nasal spray.  She thinks that might also help her stomach.  She denies overly snacking.  On today's visit she was encouraged to keep drinking fluids to not become dehydrated and to eat as tolerated.        Reviewed resident with nursing staff.  New orders placed.             Nursing and prior provider notes reviewed on this visit. Discussed visit with PCP and nursing staff/ supervisor.      Review of  Systems   Constitutional:  Positive for activity change. Negative for appetite change, fatigue, fever and unexpected weight change.   Eyes:  Negative for pain, discharge and visual disturbance.   Respiratory:  Negative for shortness of breath.    Cardiovascular:  Negative for chest pain and palpitations.   Gastrointestinal:  Positive for diarrhea. Negative for abdominal pain and constipation.   Genitourinary:  Negative for difficulty urinating, dysuria, hematuria and urgency.   Musculoskeletal:  Positive for gait problem. Negative for arthralgias.   Neurological:  Negative for syncope and weakness.   Psychiatric/Behavioral:  Negative for confusion and sleep disturbance.    All other systems reviewed and are negative.         ALLERGY: Reviewed, unchanged  Allergies   Allergen Reactions    Heparin Other (See Comments)     History of HIT       HISTORY:  Medical Hx: Reviewed, unchanged  Family Hx: Reviewed, unchanged  Soc Hx: Reviewed,  unchanged      Physical Exam  Vitals reviewed.   Constitutional:       General: She is not in acute distress.     Appearance: Normal appearance. She is not ill-appearing.   HENT:      Head: Normocephalic.      Right Ear: Tympanic membrane normal.      Left Ear: Tympanic membrane normal.      Mouth/Throat:      Mouth: Mucous membranes are moist.      Pharynx: Oropharynx is clear.   Eyes:      General:         Right eye: No discharge.         Left eye: No discharge.      Extraocular Movements: Extraocular movements intact.      Conjunctiva/sclera: Conjunctivae normal.      Pupils: Pupils are equal, round, and reactive to light.   Cardiovascular:      Rate and Rhythm: Normal rate and regular rhythm.      Pulses: Normal pulses.      Heart sounds: Normal heart sounds.   Pulmonary:      Effort: Pulmonary effort is normal. No respiratory distress.   Chest:      Chest wall: No tenderness.   Abdominal:      General: Bowel sounds are normal.      Palpations: Abdomen is soft.      Tenderness: There  "is no abdominal tenderness.   Musculoskeletal:         General: Deformity present. No swelling. Normal range of motion.      Cervical back: Normal range of motion.      Right lower leg: No edema.      Left lower leg: No edema.      Comments: Bilateral AKA   Skin:     General: Skin is warm and dry.   Neurological:      Mental Status: She is alert and oriented to person, place, and time. Mental status is at baseline.      Motor: No weakness.   Psychiatric:         Mood and Affect: Mood normal.         Behavior: Behavior normal.         Thought Content: Thought content normal.         Judgment: Judgment normal.            Laboratory results / Imaging reviewed: Hard copy/ies in medical chart:    Vitals:    05/23/24 2013   BP: 136/55   Pulse: 72   Resp: 19   Temp: (!) 97 °F (36.1 °C)   SpO2: 97%       Current Medications:  All medications reviewed and updated in Nursing Home Chart    Please note: This note was completed in part utilizing a voice-recognition software may have been used in the preparation of this document. Grammatical errors, random word insertion, spelling mistakes, and incomplete sentences may be an occasional consequence of the system secondary to software limitations, ambient noise and hardware issues. Occasional wrong word or \"sound-alike\" substitutions may have occurred due to the inherent limitations of voice recognition software. At the time of dictation, efforts were made to edit, clarify and/or correct errors. Interpretation should be guided by context. Please read the chart carefully and recognize, using context, where substitutions have occurred. If you have any questions or concerns about the context, text or information contained within the body of this dictation, please contact myself, the provider, for further clarification.      ALEX Luevano  5/23/2024  "

## 2024-05-24 NOTE — ASSESSMENT & PLAN NOTE
Potassium was 4.3 most recently on 05/15  Continue to monitor in setting of current loose bowels  Replete as necessary

## 2024-05-24 NOTE — ASSESSMENT & PLAN NOTE
TSH was recently elevated on routine labs  Levothyroxine increased to 50 mcg po daily on 03/07  TSH on 04/18  4.18  Continue to monitor Q6 months

## 2024-05-24 NOTE — ASSESSMENT & PLAN NOTE
Recent increase in GERD symptoms  Complaints of heartburn  Has had to utilize TUMS  Trial of pepcid had failed  Pepcid changed for pantoprazole  Encourage to eat meals upright, avoiding laying flat for at least 30 minutes following meals  Avoid trigger foods

## 2024-05-24 NOTE — ASSESSMENT & PLAN NOTE
Continues to have loose bowels  Starting florastor   No fevers noted  Denies N/V  Continue to hold bowel regimen medications and slow mag  KUB negative for obstruction  No abdominal distention   Encouraged to maintain fluids as tolerated   Continue to periodically monitor blood work as WBC have been trended up

## 2024-06-10 ENCOUNTER — NURSING HOME VISIT (OUTPATIENT)
Dept: GERIATRICS | Facility: OTHER | Age: 64
End: 2024-06-10
Payer: COMMERCIAL

## 2024-06-10 DIAGNOSIS — E83.42 HYPOMAGNESEMIA: ICD-10-CM

## 2024-06-10 DIAGNOSIS — Z79.4 TYPE 2 DIABETES MELLITUS WITH HYPERGLYCEMIA, WITH LONG-TERM CURRENT USE OF INSULIN (HCC): ICD-10-CM

## 2024-06-10 DIAGNOSIS — E11.65 TYPE 2 DIABETES MELLITUS WITH HYPERGLYCEMIA, WITH LONG-TERM CURRENT USE OF INSULIN (HCC): ICD-10-CM

## 2024-06-10 DIAGNOSIS — R19.7 DIARRHEA, UNSPECIFIED TYPE: Primary | ICD-10-CM

## 2024-06-10 PROCEDURE — 99308 SBSQ NF CARE LOW MDM 20: CPT

## 2024-06-19 ENCOUNTER — NURSING HOME VISIT (OUTPATIENT)
Dept: GERIATRICS | Facility: OTHER | Age: 64
End: 2024-06-19
Payer: COMMERCIAL

## 2024-06-19 DIAGNOSIS — R19.7 DIARRHEA, UNSPECIFIED TYPE: ICD-10-CM

## 2024-06-19 DIAGNOSIS — Z89.611 S/P BILATERAL ABOVE KNEE AMPUTATION (HCC): ICD-10-CM

## 2024-06-19 DIAGNOSIS — Z86.74 HISTORY OF CARDIAC ARREST: ICD-10-CM

## 2024-06-19 DIAGNOSIS — D50.8 IRON DEFICIENCY ANEMIA SECONDARY TO INADEQUATE DIETARY IRON INTAKE: ICD-10-CM

## 2024-06-19 DIAGNOSIS — Z86.73 HISTORY OF CVA (CEREBROVASCULAR ACCIDENT): ICD-10-CM

## 2024-06-19 DIAGNOSIS — E21.3 HYPERPARATHYROIDISM (HCC): ICD-10-CM

## 2024-06-19 DIAGNOSIS — I73.9 PAD (PERIPHERAL ARTERY DISEASE) (HCC): Chronic | ICD-10-CM

## 2024-06-19 DIAGNOSIS — Z89.612 S/P BILATERAL ABOVE KNEE AMPUTATION (HCC): ICD-10-CM

## 2024-06-19 DIAGNOSIS — E11.65 TYPE 2 DIABETES MELLITUS WITH HYPERGLYCEMIA, WITH LONG-TERM CURRENT USE OF INSULIN (HCC): Primary | ICD-10-CM

## 2024-06-19 DIAGNOSIS — I10 PRIMARY HYPERTENSION: ICD-10-CM

## 2024-06-19 DIAGNOSIS — E03.9 ACQUIRED HYPOTHYROIDISM: ICD-10-CM

## 2024-06-19 DIAGNOSIS — Z79.4 TYPE 2 DIABETES MELLITUS WITH HYPERGLYCEMIA, WITH LONG-TERM CURRENT USE OF INSULIN (HCC): Primary | ICD-10-CM

## 2024-06-19 DIAGNOSIS — E78.49 FAMILIAL COMBINED HYPERLIPIDEMIA: ICD-10-CM

## 2024-06-19 DIAGNOSIS — R94.31 PROLONGED Q-T INTERVAL ON ECG: ICD-10-CM

## 2024-06-19 DIAGNOSIS — F33.9 DEPRESSION, RECURRENT (HCC): ICD-10-CM

## 2024-06-19 DIAGNOSIS — K21.9 GASTROESOPHAGEAL REFLUX DISEASE WITHOUT ESOPHAGITIS: ICD-10-CM

## 2024-06-19 PROCEDURE — 99309 SBSQ NF CARE MODERATE MDM 30: CPT | Performed by: STUDENT IN AN ORGANIZED HEALTH CARE EDUCATION/TRAINING PROGRAM

## 2024-06-20 VITALS
RESPIRATION RATE: 18 BRPM | OXYGEN SATURATION: 97 % | HEART RATE: 80 BPM | DIASTOLIC BLOOD PRESSURE: 59 MMHG | WEIGHT: 198.1 LBS | SYSTOLIC BLOOD PRESSURE: 140 MMHG | BODY MASS INDEX: 34 KG/M2 | TEMPERATURE: 98.1 F

## 2024-06-20 RX ORDER — SACCHAROMYCES BOULARDII 250 MG
250 CAPSULE ORAL 2 TIMES DAILY
COMMUNITY

## 2024-06-20 NOTE — ASSESSMENT & PLAN NOTE
Seems fairly mild, stable on lab review  -monitor on routine labs  -borderline microcytic and hx of low iron on lab review - continue ferrous sulfate PO qOD  -likely component of CKD as well  -monitor for acute bleed - no present signs  -consider further workup, Heme consult if persistent/worsening  -transfuse PRN Hb <7  -low threshold to hold anticoagulation if evidence of bleed or worsening Hb

## 2024-06-20 NOTE — ASSESSMENT & PLAN NOTE
Lab Results   Component Value Date    HGBA1C 9.8 (H) 10/25/2022       Recent A1c worsened  Encourage CCD  Monitor glucose routinely - recent fasting sugars have generally been high 100s-200s  Avoid hypoglycemia  Continue Lantus, will increase from 22 to 26 u daily and titrate further as appropriate  Trulicity recently increased to 1.5 mg subQ weekly  Follow up HgbA1c Q3 months July/Oct/Jan/April

## 2024-06-20 NOTE — ASSESSMENT & PLAN NOTE
-stable  -continue PRN calcium carbonate and PPI  -recommend OOB with meals, sit upright for at least 30 minutes afterwards, avoid trigger foods  -continue to monitor  -follow up with GI as appropriate

## 2024-06-20 NOTE — ASSESSMENT & PLAN NOTE
Patient with recent diarrhea since the last ~1 month  Onset roughly coincided with or just after recent apparent viral URI  Symptoms much improving, last BM yesterday reported formed  No acute GI symptoms otherwise at this time including abd pain, N/V. Reporting good appetite.  Stool PCR was negative  Likely related to recent viral infection causing gastroenteritis which is now clinically improving  Supportive care  Encourage appropriate PO intake/hydration  Continue probiotic  Consider GI consult if persistent/worsening

## 2024-06-20 NOTE — ASSESSMENT & PLAN NOTE
Last TSH WNL  Continue levothyroxine  No apparent acute/associated symptoms   Monitor TSH periodically; Adjust dosing as appropriate  Consider Endocrine consult if persistently abnormal or symptoms develop

## 2024-06-20 NOTE — PROGRESS NOTES
[facility]  Facility: [facility]    PROGRESS NOTE  Nursing Home Place of Service: nursing home place of service: POS 32 Unskilled- No Part A Coverage    NAME: Dianna Varghese  : 1960 AGE: 63 y.o. SEX: female MRN: [id number]  DATE OF ENCOUNTER: 2024    Assessment and Plan     Problem List Items Addressed This Visit       Esophageal reflux     -stable  -continue PRN calcium carbonate and PPI  -recommend OOB with meals, sit upright for at least 30 minutes afterwards, avoid trigger foods  -continue to monitor  -follow up with GI as appropriate         Depression, recurrent (HCC)     Reports mood stable and well controlled  Psych following  Supportive care  Continue regimen including  Continue buproprion 150 mg po daily  Continue sertraline 100 mg po daily at bedtime  (Was on quetiapine, has been weaned and discontinued as of 2024 and appears tolerating well without it)         Hypertension     Monitor BP - recently generally stable 110s-140s systolic  Pulse generally 60s-80s  Avoid hypotension  Continue regimen including amlodipine, lisinopril, metoprolol, HCTZ, hydralazine, amiodarone  Consider weaning/adjusting extensive regimen as appropriate, especially if BP remains well controlled or becomes softer  Following Cardiology outpatient         Familial combined hyperlipidemia     Continue statin          Type 2 diabetes mellitus with hyperglycemia, with long-term current use of insulin (HCC) - Primary       Lab Results   Component Value Date    HGBA1C 9.8 (H) 10/25/2022       Recent A1c worsened  Encourage CCD  Monitor glucose routinely - recent fasting sugars have generally been high 100s-200s  Avoid hypoglycemia  Continue Lantus, will increase from 22 to 26 u daily and titrate further as appropriate  Trulicity recently increased to 1.5 mg subQ weekly  Follow up HgbA1c Q3 months July/Oct//April         Hyperparathyroidism (HCC)     Seems to be primary on chart  review  Continue multivitamin/vit D  Monitor calcium on routine labs - has been stable/WNL recently  Has followed with Endocrinology, consider updated visit/consultation if issue becomes symptomatic or worsens         PAD (peripheral artery disease) (HCC) (Chronic)     S/p left AKA on 11/23/22  S/p right AKA stump revision on 01/27/23  Continue aspirin, statin, Eliquis  Working with rehab regarding prosthetics, PT/OT as appropriate  Follow up with Vascular as appropriate         Anemia     Seems fairly mild, stable on lab review  -monitor on routine labs  -borderline microcytic and hx of low iron on lab review - continue ferrous sulfate PO qOD  -likely component of CKD as well  -monitor for acute bleed - no present signs  -consider further workup, Heme consult if persistent/worsening  -transfuse PRN Hb <7  -low threshold to hold anticoagulation if evidence of bleed or worsening Hb         Diarrhea, unspecified     Patient with recent diarrhea since the last ~1 month  Onset roughly coincided with or just after recent apparent viral URI  Symptoms much improving, last BM yesterday reported formed  No acute GI symptoms otherwise at this time including abd pain, N/V. Reporting good appetite.  Stool PCR was negative  Likely related to recent viral infection causing gastroenteritis which is now clinically improving  Supportive care  Encourage appropriate PO intake/hydration  Continue probiotic  Consider GI consult if persistent/worsening           History of CVA (cerebrovascular accident)     Pt had right CVA 11/14/2022  Continue aspirin, statin  Pt has been off plavix since 03/2023 after vascular visit         Prolonged Q-T interval on ECG     Noted on prior EKGs  No apparent acute/associated symptoms  Avoid QT prolonging medications as able  Following with Cardiology         S/P bilateral above knee amputation (HCC)     Due to significant PAD and complications after attempted revascularizations resident required  bilateral AKA  Left leg done on 11/23/22  Right leg done on 12/01/22  Continue follow up with Vascular as appropriate  Continue PT/OT for bilateral prosthetics         Acquired hypothyroidism     Last TSH WNL  Continue levothyroxine  No apparent acute/associated symptoms   Monitor TSH periodically; Adjust dosing as appropriate  Consider Endocrine consult if persistently abnormal or symptoms develop         History of cardiac arrest     S/p cardiac arrest on 1/18/2023 considered due to electrolyte abnormalities/severe sepsis  No recent/acute cardiac complaints  Continue cardiac regimen including aspirin, Eliquis, amiodarone, atorvastatin  Follow up with Cardiology as appropriate/yearly; had recent Cardio visit 4/8/24 and considered stable                Chief Complaint     Follow up 60 day    History of Present Illness     Dianna Varghese is a 63 y.o. female who was seen today for follow up. PMH including bilateral above knee amputations, cardiac arrest, type 2 DM, TIA, major depressive disorder, peripheral vascular disease, hypertension, hyperlipidemia, and GERD     Patient seen and examined in room  Others present: none  Patient laying in bed watching TV  Appears comfortable, awake, alert, oriented to situation, able to converse appropriately  Polite, Aox3, in good spirits. Reports she feels well overall with no acute concerns. Around a month ago she had some symptoms of viral URI including some coughing and since around that time also had some diarrhea. She notes that the respiratory symptoms including cough have largely resolved and likewise the diarrhea has been improving significantly and is now much better, recently no abd pain/N/V, last BM yesterday was formed. She does not feel confused or feverish or acutely ill. No acute associated breathing trouble, she feels she is breathing fine on room air. Reports good appetite. Urinating well without acute issue. She generally requires andree lift for transfer and  "utilizes wheelchair, able to self-propel. She has been working with therapy to use her prosthetics for bilateral AKA, with the goal of being able to transfer and ultimately hopefully to ambulate with walker with her prosthetics. No CP/palpitations including on exertion; does have some chronic stable dyspnea on exertion which is unchanged. No acute pain anywhere. No acute cardiopulmonary, abdominal, or urinary symptoms; see ROS for more details.   No further questions or acute concerns identified.  No acute concerns per NP and nursing.       Lab Review:   1/18/24: CBC with Hb 10.8; CMP with Cr 1.43, eGFR 41 (recent baseline seems around 40s-60s); TSH 8.20, FT4 0.6; A1c 7.7  3/6/24: CBC with Hb 10.8; BMP generally stable, Cr 1.41, eGFR 42; TSH 7.96  4/18/24: CBC with Hb 10.9; CMP with Cr 1.51, eGFR 39; A1c 9.3; TSH WNL  6/3/24: CBC with WBC 11.8, Hb 10.5  6/10/24: CBC with WBC 12.3, Hb 10.5  6/17/24: CBC with WBC 12.1, Hb 10.5        EKG Jan 2023: NSR, 91bpm, Qtc 511  LEON Jan 2023: EF 60, \"No vegetations or other evidence of endocarditis seen \"       The following portions of the patient's history were reviewed and updated as appropriate: allergies, current medications, past family history, past medical history, past social history, past surgical history and problem list.    Review of Systems     Review of Systems   Constitutional:  Negative for appetite change, chills, diaphoresis and fever.   HENT:  Negative for congestion, drooling, ear pain, hearing loss, rhinorrhea, sore throat and trouble swallowing.    Eyes:  Negative for pain, discharge, redness, itching and visual disturbance.   Respiratory:  Negative for cough, chest tightness, shortness of breath and wheezing.    Cardiovascular:  Negative for chest pain, palpitations and leg swelling.   Gastrointestinal:  Negative for abdominal pain, blood in stool, constipation, diarrhea (recent, improving/resolving), nausea and vomiting.   Genitourinary:  Negative for " difficulty urinating, dysuria and hematuria.   Musculoskeletal:  Positive for gait problem. Negative for arthralgias, back pain and neck pain.   Skin:  Negative for color change.   Neurological:  Negative for dizziness, facial asymmetry, speech difficulty, weakness, light-headedness and headaches.   Psychiatric/Behavioral:  Negative for agitation, behavioral problems and confusion. The patient is not nervous/anxious and is not hyperactive.    All other systems reviewed and are negative.      Active Problem List     Patient Active Problem List   Diagnosis    Esophageal reflux    DM (diabetes mellitus) type II, controlled, with peripheral vascular disorder ([facility])    Class 3 severe obesity due to excess calories with serious comorbidity and body mass index (BMI) of 40.0 to 44.9 in adult ([facility])    Depression, recurrent ([facility])    Anxiety    Hypertension    Diabetic peripheral neuropathy ([facility])    Vitamin D deficiency    Systolic murmur    Familial combined hyperlipidemia    Arthritis    Transaminitis    Moderate protein-calorie malnutrition ([facility])    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 ([facility])    Type 2 diabetes mellitus with hyperglycemia, with long-term current use of insulin ([facility])    Hyperparathyroidism ([facility])    Wound of lower extremity    Non healing left heel wound    Hypokalemia    PAD (peripheral artery disease) ([facility])    Generalized edema due to fluid overload    Anemia    Diabetic ulcer of heel associated with diabetes mellitus due to underlying condition ([facility])    HIT (heparin-induced thrombocytopenia) ([facility])    Diarrhea, unspecified    Mild protein-calorie malnutrition ([facility])    History of CVA (cerebrovascular accident)    Fall    Troponin level elevated    Hypomagnesemia    Osteomyelitis ([facility])    Prolonged Q-T interval on ECG    Thrombocytosis    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 ([facility])    Weight gain     S/P bilateral above knee amputation (HCC)    Acquired hypothyroidism    History of cardiac arrest    Advance care planning    Chest congestion       Objective     Vital Signs:     BP: 140/59 mmHg  6/19/2024 16:54 Temp:98.1 °F  6/15/2024 02:56 Pulse:80 bpm  6/19/2024 08:53 Weight:198.1 Lbs  6/5/2024 08:33   Resp:18 Breaths/min  6/15/2024 02:56 BS:278 mg/dL  6/14/2024 05:26 O2:97 %  4/16/2024 14:46 Pain:0  6/19/2024 16:54       Physical Exam  Vitals reviewed.   Constitutional:       General: She is not in acute distress.     Appearance: She is obese. She is not toxic-appearing or diaphoretic.   HENT:      Head: Normocephalic and atraumatic.      Right Ear: External ear normal.      Left Ear: External ear normal.      Nose: Nose normal. No rhinorrhea.      Mouth/Throat:      Mouth: Mucous membranes are dry.      Pharynx: Oropharynx is clear. No posterior oropharyngeal erythema.   Eyes:      General: No scleral icterus.        Right eye: No discharge.         Left eye: No discharge.      Extraocular Movements: Extraocular movements intact.      Conjunctiva/sclera: Conjunctivae normal.      Pupils: Pupils are equal, round, and reactive to light.   Cardiovascular:      Rate and Rhythm: Normal rate and regular rhythm.   Pulmonary:      Effort: Pulmonary effort is normal. No respiratory distress.      Breath sounds: Normal breath sounds. No wheezing or rales.   Abdominal:      General: Bowel sounds are normal.      Palpations: Abdomen is soft.      Tenderness: There is no abdominal tenderness. There is no guarding.   Musculoskeletal:      Cervical back: No rigidity.      Comments: Bilateral AKA, sites appear well healed without bleed, drainage, or erythema   Skin:     General: Skin is warm and dry.      Coloration: Skin is not jaundiced.   Neurological:      General: No focal deficit present.      Mental Status: She is alert and oriented to person, place, and time. Mental status is at baseline.   Psychiatric:         Mood  and Affect: Mood normal.         Behavior: Behavior normal.         Thought Content: Thought content normal.         Judgment: Judgment normal.         Pertinent Laboratory/Diagnostic Studies:  Laboratory and Imaging studies reviewed. Full report in the paper chart.     Current Medications   Medications reviewed and updated in facility chart.      -Total time spent on this encounter today including documentation and workup review, face to face time, history and exam, and documentation/orders was approximately 35 minutes.  -This note will be copied to [facility] EMR where vitals and medication orders are placed.    Seb Chun D.O.  Geriatric Medicine  6/20/2024 9:39 AM

## 2024-06-21 NOTE — ASSESSMENT & PLAN NOTE
History of low magnesium  Currently on slow mag  Consider discontinuation with periodic monitoring of magnesium level  Holding in setting of recent diarrhea

## 2024-06-21 NOTE — ASSESSMENT & PLAN NOTE
Continues to monitor loose bowels  Occurrences recently decreasing   Continue florastor BID  Continue banatrol flakes BID  No fevers noted  Denies N/V  KUB negative for obstruction  No abdominal distention   Encouraged to maintain fluids as tolerated   Continue to periodically monitor blood work as WBC have been trended up

## 2024-06-21 NOTE — PROGRESS NOTES
[address], [address], [place] [address]  [phone]    NAME: Dianna Varghese  AGE: 63 y.o. SEX: female    Progress Note    Location: [facility]  POS: 32 ([facility])  Code Status: Full Code    Chief complaint / Reason for visit: Acute Visit    Assessment/Plan:    Diarrhea, unspecified  Continues to monitor loose bowels  Occurrences recently decreasing   Continue florastor BID  Continue banatrol flakes BID  No fevers noted  Denies N/V  KUB negative for obstruction  No abdominal distention   Encouraged to maintain fluids as tolerated   Continue to periodically monitor blood work as WBC have been trended up    Hypomagnesemia  History of low magnesium  Currently on slow mag  Consider discontinuation with periodic monitoring of magnesium level  Holding in setting of recent diarrhea     Type 2 diabetes mellitus with hyperglycemia, with long-term current use of insulin ([facility])  Hemoglobin A1c (04/18/24)  9.3  Continue Lantus subQ insulin 22 units daily  Trulicity increased to 1.5 mg/0.5mL (1.5 mg) subQ weekly  Blood sugars have remained under control throughout recent gastroenteritis   Continue to  monitor closely through illness   Avoid hypoglycemia  Hypoglycemia protocol  Follow up HgbA1c Q3 months July/Oct/Jan/April        This is a 63 y.o. female seen today at [facility].  Medical history includes, not limited to, bilateral above knee amputations, cardiac arrest, type 2 DM, TIA, major depressive disorder, peripheral vascular disease, hypertension, hyperlipidemia, and GERD.    Resident seen today for a follow up on an acute issue.  She has been experiencing loose bowels over the course of a month.  Blood work has been followed closely.  Upon entering her room she is resting comfortably in bed.  Alert and oriented x 3.  Denies N/V.  Denies fevers or chills.  States that the amount of loose bowel movements have been decreasing.  She denies abdominal pain.  She has been  maintaining her diet, able to continue drinking fluids.  She states she has been tolerating the banatrol flakes since changing what liquid they are placed in.       Reviewed resident with nursing staff.  New orders placed.  Reviewed recent blood work.           Nursing and prior provider notes reviewed on this visit. Discussed visit with PCP and nursing staff/ supervisor.      Review of Systems   Constitutional:  Positive for activity change. Negative for appetite change, fatigue, fever and unexpected weight change.   Eyes:  Negative for pain, discharge and visual disturbance.   Respiratory:  Negative for shortness of breath.    Cardiovascular:  Negative for chest pain and palpitations.   Gastrointestinal:  Positive for diarrhea. Negative for abdominal pain and constipation.   Genitourinary:  Negative for difficulty urinating, dysuria, hematuria and urgency.   Musculoskeletal:  Positive for gait problem. Negative for arthralgias.   Neurological:  Negative for syncope and weakness.   Psychiatric/Behavioral:  Negative for confusion and sleep disturbance.    All other systems reviewed and are negative.         ALLERGY: Reviewed, unchanged  Allergies   Allergen Reactions    Heparin Other (See Comments)     History of HIT       HISTORY:  Medical Hx: Reviewed, unchanged  Family Hx: Reviewed, unchanged  Soc Hx: Reviewed,  unchanged      Physical Exam  Vitals reviewed.   Constitutional:       General: She is not in acute distress.     Appearance: Normal appearance. She is not ill-appearing.   HENT:      Head: Normocephalic.      Right Ear: Tympanic membrane normal.      Left Ear: Tympanic membrane normal.      Mouth/Throat:      Mouth: Mucous membranes are moist.      Pharynx: Oropharynx is clear.   Eyes:      General:         Right eye: No discharge.         Left eye: No discharge.      Extraocular Movements: Extraocular movements intact.      Conjunctiva/sclera: Conjunctivae normal.      Pupils: Pupils are equal, round,  "and reactive to light.   Cardiovascular:      Rate and Rhythm: Normal rate and regular rhythm.      Pulses: Normal pulses.      Heart sounds: Normal heart sounds.   Pulmonary:      Effort: Pulmonary effort is normal. No respiratory distress.   Chest:      Chest wall: No tenderness.   Abdominal:      General: Bowel sounds are normal.      Palpations: Abdomen is soft.      Tenderness: There is no abdominal tenderness.   Musculoskeletal:         General: Deformity present. No swelling. Normal range of motion.      Cervical back: Normal range of motion.      Right lower leg: No edema.      Left lower leg: No edema.      Comments: Bilateral AKA   Skin:     General: Skin is warm and dry.   Neurological:      Mental Status: She is alert and oriented to person, place, and time. Mental status is at baseline.      Motor: No weakness.   Psychiatric:         Mood and Affect: Mood normal.         Behavior: Behavior normal.         Thought Content: Thought content normal.         Judgment: Judgment normal.            Laboratory results / Imaging reviewed: Hard copy/ies in medical chart:    Vitals:    06/20/24 2129   BP: 140/59   Pulse: 80   Resp: 18   Temp: 98.1 °F (36.7 °C)   SpO2: 97%       Current Medications:  All medications reviewed and updated in Nursing Home Chart    Please note: This note was completed in part utilizing a voice-recognition software may have been used in the preparation of this document. Grammatical errors, random word insertion, spelling mistakes, and incomplete sentences may be an occasional consequence of the system secondary to software limitations, ambient noise and hardware issues. Occasional wrong word or \"sound-alike\" substitutions may have occurred due to the inherent limitations of voice recognition software. At the time of dictation, efforts were made to edit, clarify and/or correct errors. Interpretation should be guided by context. Please read the chart carefully and recognize, using context, " where substitutions have occurred. If you have any questions or concerns about the context, text or information contained within the body of this dictation, please contact myself, the provider, for further clarification.      ALEX Luevano  6/20/2024

## 2024-07-23 ENCOUNTER — NURSING HOME VISIT (OUTPATIENT)
Dept: GERIATRICS | Facility: OTHER | Age: 64
End: 2024-07-23
Payer: COMMERCIAL

## 2024-07-23 DIAGNOSIS — I10 PRIMARY HYPERTENSION: ICD-10-CM

## 2024-07-23 DIAGNOSIS — Z89.612 S/P BILATERAL ABOVE KNEE AMPUTATION (HCC): ICD-10-CM

## 2024-07-23 DIAGNOSIS — E03.9 ACQUIRED HYPOTHYROIDISM: ICD-10-CM

## 2024-07-23 DIAGNOSIS — Z79.4 TYPE 2 DIABETES MELLITUS WITH HYPERGLYCEMIA, WITH LONG-TERM CURRENT USE OF INSULIN (HCC): Primary | ICD-10-CM

## 2024-07-23 DIAGNOSIS — F41.9 ANXIETY: ICD-10-CM

## 2024-07-23 DIAGNOSIS — E11.65 TYPE 2 DIABETES MELLITUS WITH HYPERGLYCEMIA, WITH LONG-TERM CURRENT USE OF INSULIN (HCC): Primary | ICD-10-CM

## 2024-07-23 DIAGNOSIS — Z89.611 S/P BILATERAL ABOVE KNEE AMPUTATION (HCC): ICD-10-CM

## 2024-07-23 PROCEDURE — 99309 SBSQ NF CARE MODERATE MDM 30: CPT

## 2024-08-21 ENCOUNTER — NURSING HOME VISIT (OUTPATIENT)
Dept: GERIATRICS | Facility: OTHER | Age: 64
End: 2024-08-21
Payer: COMMERCIAL

## 2024-08-21 DIAGNOSIS — R19.7 DIARRHEA, UNSPECIFIED TYPE: ICD-10-CM

## 2024-08-21 DIAGNOSIS — Z89.611 S/P BILATERAL ABOVE KNEE AMPUTATION (HCC): ICD-10-CM

## 2024-08-21 DIAGNOSIS — E21.3 HYPERPARATHYROIDISM (HCC): ICD-10-CM

## 2024-08-21 DIAGNOSIS — Z86.74 HISTORY OF CARDIAC ARREST: ICD-10-CM

## 2024-08-21 DIAGNOSIS — D72.823 LEUKEMOID REACTION: ICD-10-CM

## 2024-08-21 DIAGNOSIS — K21.9 GASTROESOPHAGEAL REFLUX DISEASE, UNSPECIFIED WHETHER ESOPHAGITIS PRESENT: ICD-10-CM

## 2024-08-21 DIAGNOSIS — Z79.4 TYPE 2 DIABETES MELLITUS WITH HYPERGLYCEMIA, WITH LONG-TERM CURRENT USE OF INSULIN (HCC): Primary | ICD-10-CM

## 2024-08-21 DIAGNOSIS — I73.9 PAD (PERIPHERAL ARTERY DISEASE) (HCC): Chronic | ICD-10-CM

## 2024-08-21 DIAGNOSIS — I10 PRIMARY HYPERTENSION: ICD-10-CM

## 2024-08-21 DIAGNOSIS — F33.9 DEPRESSION, RECURRENT (HCC): ICD-10-CM

## 2024-08-21 DIAGNOSIS — R94.31 PROLONGED Q-T INTERVAL ON ECG: ICD-10-CM

## 2024-08-21 DIAGNOSIS — Z89.612 S/P BILATERAL ABOVE KNEE AMPUTATION (HCC): ICD-10-CM

## 2024-08-21 DIAGNOSIS — E78.49 FAMILIAL COMBINED HYPERLIPIDEMIA: ICD-10-CM

## 2024-08-21 DIAGNOSIS — Z86.73 HISTORY OF CVA (CEREBROVASCULAR ACCIDENT): ICD-10-CM

## 2024-08-21 DIAGNOSIS — E03.9 ACQUIRED HYPOTHYROIDISM: ICD-10-CM

## 2024-08-21 DIAGNOSIS — E11.65 TYPE 2 DIABETES MELLITUS WITH HYPERGLYCEMIA, WITH LONG-TERM CURRENT USE OF INSULIN (HCC): Primary | ICD-10-CM

## 2024-08-21 DIAGNOSIS — D50.8 IRON DEFICIENCY ANEMIA SECONDARY TO INADEQUATE DIETARY IRON INTAKE: ICD-10-CM

## 2024-08-21 DIAGNOSIS — E11.42 DIABETIC PERIPHERAL NEUROPATHY (HCC): ICD-10-CM

## 2024-08-21 PROCEDURE — 99310 SBSQ NF CARE HIGH MDM 45: CPT | Performed by: STUDENT IN AN ORGANIZED HEALTH CARE EDUCATION/TRAINING PROGRAM

## 2024-08-21 NOTE — ASSESSMENT & PLAN NOTE
Last TSH WNL  Continue levothyroxine 50mcg  No apparent acute/associated symptoms   Monitor TSH periodically; Adjust dosing as appropriate  Consider Endocrine consult if persistently abnormal or symptoms develop

## 2024-08-21 NOTE — PROGRESS NOTES
[facility]  Facility: [facility]    PROGRESS NOTE  Nursing Home Place of Service: nursing home place of service: POS 32 Unskilled- No Part A Coverage    NAME: Dianna Varghese  : 1960 AGE: 63 y.o. SEX: female MRN: [id number]  DATE OF ENCOUNTER: 2024    Assessment and Plan     Problem List Items Addressed This Visit       Esophageal reflux     -stable  -continue PRN calcium carbonate and PPI  -recommend OOB with meals, sit upright for at least 30 minutes afterwards, avoid trigger foods  -continue to monitor  -follow up with GI as appropriate         Depression, recurrent (HCC)     Reports mood stable and well controlled  Psych following  Supportive care  Continue regimen including  Continue buproprion 150 mg po daily  Continue sertraline 100 mg po daily at bedtime  (Was on quetiapine, has been weaned and discontinued as of 2024 and appears tolerating well without it)         Hypertension     Monitor BP - recently generally stable around 130s-140s systolic  Pulse generally 60s-80s  Avoid hypotension  No acute cardiac complaints  Continue regimen including amlodipine 10mg daily, lisinopril 40mg daily, metoprolol tartrate 50mg BID, HCTZ 25mg daily, hydralazine 100mg TID, amiodarone 100mg daily  Consider weaning/adjusting extensive regimen as appropriate, especially if BP remains well controlled or becomes softer. For now seems stable on current regimen  Following Cardiology outpatient         Diabetic peripheral neuropathy (HCC)       Lab Results   Component Value Date    HGBA1C 9.8 (H) 10/25/2022     Patient is s/p bilateral AKA  Neuropathy concern seems controlled/stable on gabapentin 200mg BID  Will trial wean to 100mg BID as issue stable/well controlled following healing from AKA         Familial combined hyperlipidemia     Continue statin          Type 2 diabetes mellitus with hyperglycemia, with long-term current use of insulin (HCC) - Primary         Lab Results   Component  Value Date    HGBA1C 9.8 (H) 10/25/2022       Recent A1c worsened  Encourage CCD  Monitor glucose routinely - recent fasting sugars have generally been 200s-300s  Avoid hypoglycemia  Encourage lifestyle modifications including healthy diet, weight management, exercise as appropriate  Upcoming Endocrine appt Sept 2024  Continue Lantus, being titrated and recently increased to 30u daily, will titrate to 35u daily, titrate further as appropriate  Trulicity recently increased to 3mg subQ weekly as of Aug 2024  Started on Januvia 50mg daily as of July 2024  Follow up HgbA1c Q3 months July/Oct/Jan/April         Hyperparathyroidism (HCC)     Seems to be primary on chart review  Continue multivitamin/vit D  Monitor calcium on routine labs - has been stable/WNL recently  Has followed with Endocrinology, consider updated visit/consultation if issue becomes symptomatic or worsens         PAD (peripheral artery disease) (HCC) (Chronic)     S/p left AKA on 11/23/22  S/p right AKA stump revision on 01/27/23  Continue aspirin, statin, Eliquis  Working with rehab regarding prosthetics, PT/OT as appropriate  Follow up with Vascular as appropriate         Anemia     Seems fairly mild, stable on lab review  -monitor on routine labs  -borderline microcytic and hx of low iron on lab review - continue ferrous sulfate PO qOD  -likely component of CKD as well  -monitor for acute bleed - no present signs  -consider further workup, Heme consult if persistent/worsening  -transfuse PRN Hb <7  -low threshold to hold anticoagulation if evidence of bleed or worsening Hb         Diarrhea, unspecified     Patient with recent diarrhea since the last ~1-2 month  Onset roughly coincided with or just after recent apparent viral URI  Symptoms much improving, recently with daily normal/formed BM  No acute GI symptoms otherwise at this time including abd pain, N/V. Reporting good appetite.  Stool PCR was negative  Likely related to recent viral infection  causing gastroenteritis which is now clinically resolved  Supportive care  Encourage appropriate PO intake/hydration  Continue probiotic  Patient scheduled for GI consult Sept 2024, would also be reasonable to consider colonoscopy as appropriate at discretion of GI           History of CVA (cerebrovascular accident)     Pt had right CVA 11/14/2022  Continue aspirin, statin  Pt has been off plavix since 03/2023 after vascular visit         Prolonged Q-T interval on ECG     Noted on prior EKGs  No apparent acute/associated symptoms  Avoid QT prolonging medications as able  Following with Cardiology         S/P bilateral above knee amputation (HCC)     Due to significant PAD and complications after attempted revascularizations resident required bilateral AKA  Left leg done on 11/23/22  Right leg done on 12/01/22  Continue follow up with Vascular as appropriate  Continue PT/OT for bilateral prosthetics         Acquired hypothyroidism     Last TSH WNL  Continue levothyroxine 50mcg  No apparent acute/associated symptoms   Monitor TSH periodically; Adjust dosing as appropriate  Consider Endocrine consult if persistently abnormal or symptoms develop         History of cardiac arrest     S/p cardiac arrest on 1/18/2023 considered due to electrolyte abnormalities/severe sepsis  No recent/acute cardiac complaints  Continue cardiac regimen including aspirin, Eliquis, amiodarone, atorvastatin, beta blocker  Follow up with Cardiology as appropriate/yearly; had recent Cardio visit 4/8/24 and considered stable         Leukemoid reaction     Patient with persistent mildly elevated WBC since around June 2024  Seems to have coincided with likely viral URI and associated diarrhea around that time  Monitor on routine labs - seems fairly mild but persistent as of July 2024, will recheck  Monitor for acute infectious symptoms - no recent symptoms  Consider further workup, Heme consult if persistent/worsening                  Chief  Complaint     Follow up 60 day    History of Present Illness     Dianna Varghese is a 63 y.o. female who was seen today for follow up. PMH including bilateral above knee amputations, cardiac arrest, type 2 DM, TIA, major depressive disorder, peripheral vascular disease, hypertension, hyperlipidemia, and GERD     Patient seen and examined in room  Others present: none  Patient laying in bed using smartphone  Appears comfortable, awake, alert, oriented to situation, able to converse appropriately  Polite, Aox3, in good spirits, appears to be a reasonable historian, mentation appearing similar to my prior visit. Reports she feels very well and good overall recently with no acute concerns. In recent months she was having some issue alternately with first loose stools then constipation, most recently the issue has normalized and she reports having daily regular BM recently, no abd pain/N/V. She does not feel confused or feverish or acutely ill. No SOB, she feels she is breathing fine on room air, no orthopnea. Reports good appetite. Urinating well without acute issue. She generally requires andree lift for transfer and utilizes wheelchair, able to self-propel. She has been working with therapy to use her prosthetics for bilateral AKA, notes she has been making very good progress with using walker with bilateral leg prosthetics and is happy about this. No CP/palpitations including on exertion; does have some chronic stable dyspnea on exertion which is unchanged. Denies orthostatic lightheadedness. Sleeping well. No acute pain anywhere. No acute cardiopulmonary, abdominal, or urinary symptoms; see ROS for more details.   No further questions or acute concerns identified.  No acute concerns per NP and nursing.       Lab Review:   1/18/24: CBC with Hb 10.8; CMP with Cr 1.43, eGFR 41 (recent baseline seems around 40s-60s); TSH 8.20, FT4 0.6; A1c 7.7  3/6/24: CBC with Hb 10.8; BMP generally stable, Cr 1.41, eGFR 42; TSH  "7.96  4/18/24: CBC with Hb 10.9; CMP with Cr 1.51, eGFR 39; A1c 9.3; TSH WNL  6/3/24: CBC with WBC 11.8, Hb 10.5  6/10/24: CBC with WBC 12.3, Hb 10.5  6/17/24: CBC with WBC 12.1, Hb 10.5  6/26/24: CBC with WBC 10.7, Hb 10.6; BMP generally stable/non-actionable, Cr 1.08, GFR 58; Mg 1.7  7/17/24: CBC with WBC 11.8, Hb 11.0; CMP generally stable/non-actionable, Cr 1.43, eGFR 41; A1c 9.7  8/14/24: BMP with Cr 1.21, GFR 50        EKG Jan 2023: NSR, 91bpm, Qtc 511  LEON Jan 2023: EF 60, \"No vegetations or other evidence of endocarditis seen \"       The following portions of the patient's history were reviewed and updated as appropriate: allergies, current medications, past family history, past medical history, past social history, past surgical history and problem list.    Review of Systems     Review of Systems   Constitutional:  Negative for appetite change, chills, diaphoresis and fever.   HENT:  Negative for congestion, drooling, ear pain, hearing loss, rhinorrhea, sore throat and trouble swallowing.    Eyes:  Negative for pain, discharge, redness, itching and visual disturbance.   Respiratory:  Negative for cough, chest tightness, shortness of breath and wheezing.    Cardiovascular:  Negative for chest pain, palpitations and leg swelling.   Gastrointestinal:  Negative for abdominal pain, blood in stool, constipation, diarrhea, nausea and vomiting.   Genitourinary:  Negative for difficulty urinating, dysuria and hematuria.   Musculoskeletal:  Positive for gait problem. Negative for arthralgias, back pain and neck pain.   Skin:  Negative for color change.   Neurological:  Negative for dizziness, facial asymmetry, speech difficulty, weakness, light-headedness and headaches.   Psychiatric/Behavioral:  Negative for agitation, behavioral problems and confusion. The patient is not nervous/anxious and is not hyperactive.    All other systems reviewed and are negative.      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 (HCC)    Type 2 diabetes mellitus with hyperglycemia, with long-term current use of insulin (HCC)    Hyperparathyroidism (HCC)    Wound of lower extremity    Non healing left heel wound    Hypokalemia    PAD (peripheral artery disease) (HCC)    Generalized edema due to fluid overload    Anemia    Diabetic ulcer of heel associated with diabetes mellitus due to underlying condition (HCC)    HIT (heparin-induced thrombocytopenia) ([facility])    Diarrhea, unspecified    Mild protein-calorie malnutrition (HCC)    History of CVA (cerebrovascular accident)    Fall    Troponin level elevated    Hypomagnesemia    Osteomyelitis (HCC)    Prolonged Q-T interval on ECG    Thrombocytosis    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)    Weight gain    S/P bilateral above knee amputation (HCC)    Acquired hypothyroidism    History of cardiac arrest    Advance care planning    Chest congestion    Leukemoid reaction       Objective     Vital Signs:     BP: 139/63 mmHg  8/14/2024 17:24 Temp:98.7 °F  8/18/2024 11:03 Pulse:62 bpm  8/21/2024 09:26 Weight:199.2 Lbs  8/1/2024 14:50   Resp:18 Breaths/min  8/18/2024 11:03 BS:247 mg/dL  8/21/2024 09:27 O2:95 %  8/18/2024 11:03 Pain:0  8/21/2024 06:58       Physical Exam  Vitals reviewed.   Constitutional:       General: She is not in acute distress.      Appearance: She is obese. She is not toxic-appearing or diaphoretic.   HENT:      Head: Normocephalic and atraumatic.      Right Ear: External ear normal.      Left Ear: External ear normal.      Nose: Nose normal. No rhinorrhea.      Mouth/Throat:      Mouth: Mucous membranes are dry.      Pharynx: Oropharynx is clear. No posterior oropharyngeal erythema.   Eyes:      General: No scleral icterus.        Right eye: No discharge.         Left eye: No discharge.      Extraocular Movements: Extraocular movements intact.      Conjunctiva/sclera: Conjunctivae normal.      Pupils: Pupils are equal, round, and reactive to light.   Cardiovascular:      Rate and Rhythm: Normal rate and regular rhythm.   Pulmonary:      Effort: Pulmonary effort is normal. No respiratory distress.      Breath sounds: Normal breath sounds. No wheezing or rales.   Abdominal:      General: Bowel sounds are normal.      Palpations: Abdomen is soft.      Tenderness: There is no abdominal tenderness. There is no guarding.   Musculoskeletal:      Cervical back: No rigidity.      Comments: Bilateral AKA, sites appear well healed without bleed, drainage, or erythema   Skin:     General: Skin is warm and dry.      Coloration: Skin is not jaundiced.   Neurological:      General: No focal deficit present.      Mental Status: She is alert and oriented to person, place, and time. Mental status is at baseline.   Psychiatric:         Mood and Affect: Mood normal.         Behavior: Behavior normal.         Thought Content: Thought content normal.         Judgment: Judgment normal.         Pertinent Laboratory/Diagnostic Studies:  Laboratory and Imaging studies reviewed. Full report in the paper chart.     Current Medications   Medications reviewed and updated in facility chart.      -Total time spent on this encounter today including documentation and workup review, face to face time, history and exam, and documentation/orders was approximately 50  minutes.  -This note will be copied to [facility] EMR where vitals and medication orders are placed.    Seb Chun D.O.  Geriatric Medicine  8/21/2024 1:23 PM

## 2024-08-21 NOTE — ASSESSMENT & PLAN NOTE
Lab Results   Component Value Date    HGBA1C 9.8 (H) 10/25/2022     Patient is s/p bilateral AKA  Neuropathy concern seems controlled/stable on gabapentin 200mg BID  Will trial wean to 100mg BID as issue stable/well controlled following healing from AKA

## 2024-08-21 NOTE — ASSESSMENT & PLAN NOTE
Patient with recent diarrhea since the last ~1-2 month  Onset roughly coincided with or just after recent apparent viral URI  Symptoms much improving, recently with daily normal/formed BM  No acute GI symptoms otherwise at this time including abd pain, N/V. Reporting good appetite.  Stool PCR was negative  Likely related to recent viral infection causing gastroenteritis which is now clinically resolved  Supportive care  Encourage appropriate PO intake/hydration  Continue probiotic  Patient scheduled for GI consult Sept 2024, would also be reasonable to consider colonoscopy as appropriate at discretion of GI

## 2024-08-21 NOTE — ASSESSMENT & PLAN NOTE
Lab Results   Component Value Date    HGBA1C 9.8 (H) 10/25/2022       Recent A1c worsened  Encourage CCD  Monitor glucose routinely - recent fasting sugars have generally been 200s-300s  Avoid hypoglycemia  Encourage lifestyle modifications including healthy diet, weight management, exercise as appropriate  Upcoming Endocrine appt Sept 2024  Continue Lantus, being titrated and recently increased to 30u daily, will titrate to 35u daily, titrate further as appropriate  Trulicity recently increased to 3mg subQ weekly as of Aug 2024  Started on Januvia 50mg daily as of July 2024  Follow up HgbA1c Q3 months July/Oct/Jan/April

## 2024-08-21 NOTE — ASSESSMENT & PLAN NOTE
S/p cardiac arrest on 1/18/2023 considered due to electrolyte abnormalities/severe sepsis  No recent/acute cardiac complaints  Continue cardiac regimen including aspirin, Eliquis, amiodarone, atorvastatin, beta blocker  Follow up with Cardiology as appropriate/yearly; had recent Cardio visit 4/8/24 and considered stable

## 2024-08-21 NOTE — ASSESSMENT & PLAN NOTE
Monitor BP - recently generally stable around 130s-140s systolic  Pulse generally 60s-80s  Avoid hypotension  No acute cardiac complaints  Continue regimen including amlodipine 10mg daily, lisinopril 40mg daily, metoprolol tartrate 50mg BID, HCTZ 25mg daily, hydralazine 100mg TID, amiodarone 100mg daily  Consider weaning/adjusting extensive regimen as appropriate, especially if BP remains well controlled or becomes softer. For now seems stable on current regimen  Following Cardiology outpatient

## 2024-08-21 NOTE — ASSESSMENT & PLAN NOTE
Patient with persistent mildly elevated WBC since around June 2024  Seems to have coincided with likely viral URI and associated diarrhea around that time  Monitor on routine labs - seems fairly mild but persistent as of July 2024, will recheck  Monitor for acute infectious symptoms - no recent symptoms  Consider further workup, Heme consult if persistent/worsening

## 2024-09-04 RX ORDER — FERROUS SULFATE 325(65) MG
325 TABLET ORAL
COMMUNITY

## 2024-09-05 NOTE — ASSESSMENT & PLAN NOTE
TSH was recently elevated on routine labs  Levothyroxine increased to 50 mcg po daily on 03/07  TSH on 04/18  4.18  Continue to monitor Q6 months    No

## 2024-09-05 NOTE — PROGRESS NOTES
[address], [address], [place] [address]  [phone]    NAME: Dianna Varghese  AGE: 63 y.o. SEX: female    Progress Note    Location: [facility]  POS: 32 ([facility])  Code Status: Full Code    Chief complaint / Reason for visit: Follow up Visit    Assessment/Plan:    Hypertension  Review of blood pressures from facility are stable  Encourage medication adherence   Continue weekly blood pressure checks  Encourage heart healthy diet  Continue amlodipine 10 mg po daily  Continue lisinopril 40 mg po daily  Continue metoprolol tartrate 50 mg po Q12h  Continue hydralazine 100 mg po Q8h  Continue hydrochlorothiazide 25 mg po daily  Avoid hypotension  Follow up with cardiology    Type 2 diabetes mellitus with hyperglycemia, with long-term current use of insulin (formerly [facility])  Hemoglobin A1c (04/18/24)  9.3  Continue Lantus subQ insulin 35 units daily  Trulicity increased to 3 mg/0.5mL (1.5 mg) subQ weekly  Add Januvia 50 mg po daily  Avoid hypoglycemia  Encourage lifestyle modifications including healthy diet, weight management, exercise as appropriate   Follow up HgbA1c Q3 months July/Oct/Jan/April  Follow up with Endocrinology     Acquired hypothyroidism  TSH was recently elevated on routine labs  Levothyroxine increased to 50 mcg po daily on 03/07  TSH on 04/18  4.18  Continue to monitor Q6 months     Anxiety  Well controlled  Pleasant throughout conversation, able to answer questions appropriately, participates well in physical therapy sessions  Continue bupropion 150 mg po daily  Continue sertraline 100 mg po daily  Continue 24/7 supportive care at [facility]  Follow up with psychiatry services    S/P bilateral above knee amputation (HCC)  Due to significant PAD and complications after attempted revascularizations resident required bilateral AKA  Left leg done on 11/23/22  Right leg done on 12/01/22  Continue follow up with Vascular  Continue PT/OT  Feeling more comfortable with her bilateral  prosthetics  Seen working with therapy today  Encourage out of bed as tolerated         This is a 63 y.o. female seen today at [facility].  Medical history includes, not limited to, bilateral above knee amputations, cardiac arrest, type 2 DM, TIA, major depressive disorder, peripheral vascular disease, hypertension, hyperlipidemia, and GERD.    Resident seen today for a routine follow up visit.  Currently denies pain.  States she has been doing well.  Continues to work with physical therapy.  Is seen often walking with therapy with her bilateral lower extremity prosthetics on.  She states that she continues to feel comfortable with them on.      Reviewed resident with nursing staff.  New orders placed.            Nursing and prior provider notes reviewed on this visit. Discussed visit with PCP and nursing staff/ supervisor.      Review of Systems   Constitutional:  Positive for activity change. Negative for appetite change, fatigue, fever and unexpected weight change.   Eyes:  Negative for pain, discharge and visual disturbance.   Respiratory:  Negative for shortness of breath.    Cardiovascular:  Negative for chest pain and palpitations.   Gastrointestinal:  Negative for abdominal pain and constipation.   Genitourinary:  Negative for difficulty urinating, dysuria, hematuria and urgency.   Musculoskeletal:  Positive for gait problem. Negative for arthralgias.   Neurological:  Negative for syncope and weakness.   Psychiatric/Behavioral:  Negative for confusion and sleep disturbance.    All other systems reviewed and are negative.         ALLERGY: Reviewed, unchanged  Allergies   Allergen Reactions    Heparin Other (See Comments)     History of HIT       HISTORY:  Medical Hx: Reviewed, unchanged  Family Hx: Reviewed, unchanged  Soc Hx: Reviewed,  unchanged      Physical Exam  Vitals reviewed.   Constitutional:       General: She is not in acute distress.     Appearance: Normal appearance. She is not  ill-appearing.   HENT:      Head: Normocephalic.      Right Ear: Tympanic membrane normal.      Left Ear: Tympanic membrane normal.      Mouth/Throat:      Mouth: Mucous membranes are moist.      Pharynx: Oropharynx is clear.   Eyes:      General:         Right eye: No discharge.         Left eye: No discharge.      Extraocular Movements: Extraocular movements intact.      Conjunctiva/sclera: Conjunctivae normal.      Pupils: Pupils are equal, round, and reactive to light.   Cardiovascular:      Rate and Rhythm: Normal rate and regular rhythm.      Pulses: Normal pulses.      Heart sounds: Normal heart sounds.   Pulmonary:      Effort: Pulmonary effort is normal. No respiratory distress.   Chest:      Chest wall: No tenderness.   Abdominal:      General: Bowel sounds are normal.      Palpations: Abdomen is soft.      Tenderness: There is no abdominal tenderness.   Musculoskeletal:         General: Deformity present. No swelling. Normal range of motion.      Cervical back: Normal range of motion.      Right lower leg: No edema.      Left lower leg: No edema.      Comments: Bilateral AKA   Skin:     General: Skin is warm and dry.   Neurological:      Mental Status: She is alert and oriented to person, place, and time. Mental status is at baseline.      Motor: No weakness.   Psychiatric:         Mood and Affect: Mood normal.         Behavior: Behavior normal.         Thought Content: Thought content normal.         Judgment: Judgment normal.            Laboratory results / Imaging reviewed: Hard copy/ies in medical chart:    Reviewed vitals in chart      Current Medications:  All medications reviewed and updated in Nursing Home Chart    Please note: This note was completed in part utilizing a voice-recognition software may have been used in the preparation of this document. Grammatical errors, random word insertion, spelling mistakes, and incomplete sentences may be an occasional consequence of the system secondary to  "software limitations, ambient noise and hardware issues. Occasional wrong word or \"sound-alike\" substitutions may have occurred due to the inherent limitations of voice recognition software. At the time of dictation, efforts were made to edit, clarify and/or correct errors. Interpretation should be guided by context. Please read the chart carefully and recognize, using context, where substitutions have occurred. If you have any questions or concerns about the context, text or information contained within the body of this dictation, please contact myself, the provider, for further clarification.      ALEX Luevano  9/4/2024  "

## 2024-09-05 NOTE — ASSESSMENT & PLAN NOTE
Hemoglobin A1c (04/18/24)  9.3  Continue Lantus subQ insulin 35 units daily  Trulicity increased to 3 mg/0.5mL (1.5 mg) subQ weekly  Add Januvia 50 mg po daily  Avoid hypoglycemia  Encourage lifestyle modifications including healthy diet, weight management, exercise as appropriate   Follow up HgbA1c Q3 months July/Oct/Jan/April  Follow up with Endocrinology

## 2024-09-05 NOTE — ASSESSMENT & PLAN NOTE
Well controlled  Pleasant throughout conversation, able to answer questions appropriately, participates well in physical therapy sessions  Continue bupropion 150 mg po daily  Continue sertraline 100 mg po daily  Continue 24/7 supportive care at [facility]  Follow up with psychiatry services

## 2024-09-16 ENCOUNTER — OFFICE VISIT (OUTPATIENT)
Dept: ENDOCRINOLOGY | Facility: CLINIC | Age: 64
End: 2024-09-16
Payer: COMMERCIAL

## 2024-09-16 VITALS — HEIGHT: 63 IN | DIASTOLIC BLOOD PRESSURE: 78 MMHG | SYSTOLIC BLOOD PRESSURE: 110 MMHG | BODY MASS INDEX: 35.09 KG/M2

## 2024-09-16 DIAGNOSIS — Z79.4 TYPE 2 DIABETES MELLITUS WITH HYPERGLYCEMIA, WITH LONG-TERM CURRENT USE OF INSULIN (HCC): Primary | ICD-10-CM

## 2024-09-16 DIAGNOSIS — E11.65 TYPE 2 DIABETES MELLITUS WITH HYPERGLYCEMIA, WITH LONG-TERM CURRENT USE OF INSULIN (HCC): Primary | ICD-10-CM

## 2024-09-16 PROCEDURE — 99215 OFFICE O/P EST HI 40 MIN: CPT

## 2024-09-16 RX ORDER — INSULIN GLARGINE 100 [IU]/ML
INJECTION, SOLUTION SUBCUTANEOUS
Qty: 10 ML | Refills: 4 | Status: SHIPPED | OUTPATIENT
Start: 2024-09-16

## 2024-09-16 NOTE — PROGRESS NOTES
Assessment and Plan:  1. Type 2 diabetes mellitus with hyperglycemia, with long-term current use of insulin ([facility])  Assessment & Plan:    Lab Results   Component Value Date    HGBA1C 9.8 (H) 10/25/2022     Uncontrolled type 2 diabetes mellitus with hyperglycemia and complications of retinopathy, nephropathy  Regimen includes: Lantus 35 units daily, dulaglutide 3 mg weekly, sitagliptin 50 mg daily  Glucose checks-daily, provided by facility.  Has persistent daily fasting and postprandial hyperglycemia up to the mid 300s.  Above 200, experiences polydipsia.  Placed a CGM around 3 days ago.  No data available.  Diet-high in carbohydrate intake  Labs-July 2024 A1c is 9.7%, fasting glucose 258, GFR is 41  Pending diabetic eye exam    Regimen changes-increase Lantus to 42 units daily, Trulicity to 4.5 mg weekly and discontinue sitagliptin at this time.  Requested that her facility fax in a 2-week log of blood sugars once she increases the Trulicity to 4.5 mg weekly.  Based on sugar log will assess the need for mealtime insulin.  Goal A1c for patient is 8%, given her history of PAD and multiple core morbidities  Labs to check at the end of October include hemoglobin A1c, albumin creatinine ratio, and CMP  Lifestyle modifications recommended decreasing carb portions and adding vegetables to her meals.  Patient at this time was offered referral to diabetic education but not interested.  Recommended the patient's schedule a follow-up appointment for diabetic eye exam  Follow-up in 3 months  Orders:  -     dulaglutide (Trulicity) 4.5 MG/0.5ML injection; Inject 0.5 mL (4.5 mg total) under the skin every 7 days - Start after completing 4 weeks of dulaglutide 3 mg weekly for 4 weeks. If tolerating, start dulaglutide 4.5 mg weekly.  -     insulin glargine (LANTUS) 100 units/mL subcutaneous injection; Inject 42 Units under the skin every morning  -     Comprehensive metabolic panel; Future; Expected date: 12/16/2024  -      Hemoglobin A1C; Future  -     Albumin / creatinine urine ratio; Future     Nutrition Assessment and Intervention:     Reviewed food recall journal    New Nutrition Prescription completed with patient         HPI:   Dianna Varghese   - 63 y.o. female with history of type 2 diabetes mellitus with retinopathy and nephropathy, hypothyroidism, and PAD s/p B/L LE amputation, that arrives to clinic for follow-up on diabetes mellitus.    Type II DM diagnosed more than 10 years ago.  Managed with Lantus 35 units daily, Trulicity 3 mg weekly, and sitagliptin 50 mg daily.  Prior medications include-mealtime insulin.     Denies prior hospitalizations related to diabetes mellitus  Positive family history of diabetes mellitus type 2-mom with use of insulin    Glucose checks-device: Now using a freestyle tavon for CGM, which she placed around 3 to 4 days ago.  Unable to review data.  Did bring daily sugar readings on a log from the nursing home.  Patient remains hyperglycemic throughout the day, including while fasting.  Goes up to 350s after meals.  Hyperglycemic events: When above 200-Symptoms include polydipsia    Patient's diet -3 meals per day.  Breakfast-oatmeal, diet syrup, eggs.  Lunch and dinner are similar with usually a carb, protein, sometimes fruit, sometimes veggies and applesauce.  Had not participated in diabetic education in the past    Patient does not make use of alcohol, tobacco products or recreational drugs.     Patient is pending eye exam last exam was December 2021 showing moderate diabetic retinopathy, and dental care-uses dentures.       ACEi/ARB: Lisinopril  Statin: Atorvastatin      Patient has no other complaints at this time.      Subjective:  Review of Systems   Constitutional:  Negative for chills, diaphoresis, fever and unexpected weight change.   Eyes:  Negative for visual disturbance.   Respiratory:  Negative for cough and shortness of breath.    Cardiovascular:  Negative for chest pain and  palpitations.   Gastrointestinal:  Negative for abdominal pain, constipation, diarrhea and vomiting.   Endocrine: Positive for polydipsia. Negative for polyphagia and polyuria.   Genitourinary:  Negative for difficulty urinating, dysuria, frequency and hematuria.   Musculoskeletal:  Negative for arthralgias and back pain.   Skin:  Negative for color change and rash.   Neurological:  Negative for dizziness, tremors, weakness and headaches.   All other systems reviewed and are negative.       Patient Active Problem List   Diagnosis    Esophageal reflux    DM (diabetes mellitus) type II, controlled, with peripheral vascular disorder ([facility])    Class 3 severe obesity due to excess calories with serious comorbidity and body mass index (BMI) of 40.0 to 44.9 in adult ([facility])    Depression, recurrent ([facility])    Anxiety    Hypertension    Diabetic peripheral neuropathy ([facility])    Vitamin D deficiency    Systolic murmur    Familial combined hyperlipidemia    Arthritis    Transaminitis    Moderate protein-calorie malnutrition ([facility])    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 ([facility])    Type 2 diabetes mellitus with hyperglycemia, with long-term current use of insulin ([facility])    Hyperparathyroidism ([facility])    Wound of lower extremity    Non healing left heel wound    Hypokalemia    PAD (peripheral artery disease) ([facility])    Generalized edema due to fluid overload    Anemia    Diabetic ulcer of heel associated with diabetes mellitus due to underlying condition ([facility])    HIT (heparin-induced thrombocytopenia) ([facility])    Diarrhea, unspecified    Mild protein-calorie malnutrition ([facility])    History of CVA (cerebrovascular accident)    Fall    Troponin level elevated    Hypomagnesemia    Osteomyelitis ([facility])    Prolonged Q-T interval on ECG    Thrombocytosis    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 ([facility])    Weight gain    S/P bilateral above knee  amputation ([facility])    Acquired hypothyroidism    History of cardiac arrest    Advance care planning    Chest congestion    Leukemoid reaction        Current Outpatient Medications   Medication Sig Dispense Refill    acetaminophen (TYLENOL) 325 mg tablet Take 650 mg by mouth every 4 (four) hours as needed for mild pain      acetaminophen (TYLENOL) 650 mg suppository Insert 650 mg into the rectum every 4 (four) hours as needed for mild pain      amiodarone 100 mg tablet Take 100 mg by mouth daily      amLODIPine (NORVASC) 10 mg tablet TAKE 1 TABLET BY MOUTH EVERY DAY 90 tablet 1    apixaban (ELIQUIS) 5 mg Take 5 mg by mouth 2 (two) times a day      aspirin 81 mg chewable tablet Chew 81 mg daily      atorvastatin (LIPITOR) 40 mg tablet TAKE 1 TABLET BY MOUTH EVERY DAY 90 tablet 1    buPROPion (WELLBUTRIN XL) 150 mg 24 hr tablet Take 1 tablet (150 mg total) by mouth daily 30 tablet 0    calcium carbonate (TUMS) 500 mg chewable tablet Chew 2 tablets every 6 (six) hours as needed for indigestion or heartburn      Continuous Blood Gluc  (FreeStyle Noé 14 Day [place]) LITO Use 1 Units continuous 1 each 0    Continuous Blood Gluc Sensor ([place]Style Noé 14 Day Sensor) MISC Use daily as directed for CGM - Change every 14 days 2 each 1    dulaglutide (Trulicity) 4.5 MG/0.5ML injection Inject 0.5 mL (4.5 mg total) under the skin every 7 days - Start after completing 4 weeks of dulaglutide 3 mg weekly for 4 weeks. If tolerating, start dulaglutide 4.5 mg weekly. 2 mL 4    ferrous sulfate 325 (65 Fe) mg tablet Take 325 mg by mouth daily with breakfast      gabapentin (NEURONTIN) 100 mg capsule TAKE 2 CAPSULES BY MOUTH TWICE A  capsule 0    hydrALAZINE (APRESOLINE) 100 MG tablet Take 1 tablet (100 mg total) by mouth every 8 (eight) hours  0    hydrochlorothiazide (HYDRODIURIL) 12.5 mg tablet Take 1 tablet (12.5 mg total) by mouth daily Do not start before February 4, 2023. (Patient taking differently: Take 25 mg by  "mouth daily)  0    insulin glargine (LANTUS) 100 units/mL subcutaneous injection Inject 42 Units under the skin every morning 10 mL 4    Insulin Pen Needle (B-D UF III MINI PEN NEEDLES) 31G X 5 MM MISC USE WITH INSULIN FOUR TIMES DAILY 200 each 3    Lancets 28G MISC Use 1 each 4 (four) times a day 400 each 0    levothyroxine 50 mcg tablet Take 50 mcg by mouth daily      lisinopril (ZESTRIL) 40 mg tablet TAKE 1 TABLET BY MOUTH EVERY DAY 90 tablet 1    metoprolol tartrate (LOPRESSOR) 50 mg tablet Take 1 tablet (50 mg total) by mouth every 12 (twelve) hours  0    Multiple Vitamin (multivitamin) tablet Take 1 tablet by mouth daily      pantoprazole (PROTONIX) 20 mg tablet Take 20 mg by mouth daily      polyethylene glycol (MIRALAX) 17 g packet Take 17 g by mouth daily as needed for constipation      Psyllium 25 % PACK Take 1 packet by mouth daily      saccharomyces boulardii (FLORASTOR) 250 mg capsule Take 250 mg by mouth 2 (two) times a day      sertraline (ZOLOFT) 100 mg tablet TAKE 1 TABLET BY MOUTH EVERY DAY 90 tablet 1    thiamine (VITAMIN B1) 100 mg tablet Take 1 tablet (100 mg total) by mouth daily Do not start before February 4, 2023.  0    travoprost (TRAVATAN-Z) 0.004 % ophthalmic solution Administer 1 drop into the left eye daily at bedtime       No current facility-administered medications for this visit.        Allergies   Allergen Reactions    Heparin Other (See Comments)     History of HIT        The following portions of the patient's history were reviewed and updated as appropriate: allergies, current medications, past family history, past medical history, past social history, past surgical history and problem list.             Objective:  /78 (BP Location: Right arm, Patient Position: Sitting, Cuff Size: Adult)   Ht 5' 3\" (1.6 m)   BMI 35.09 kg/m²      Body mass index is 35.09 kg/m².     Physical Exam  Vitals reviewed.   Constitutional:       Appearance: Normal appearance. She is morbidly " obese. She is not ill-appearing or diaphoretic.   HENT:      Head: Normocephalic and atraumatic.   Eyes:      General: No scleral icterus.     Conjunctiva/sclera: Conjunctivae normal.   Cardiovascular:      Rate and Rhythm: Normal rate and regular rhythm.      Pulses: Normal pulses.      Heart sounds: Normal heart sounds. No murmur heard.     No gallop.   Pulmonary:      Effort: Pulmonary effort is normal. No respiratory distress.      Breath sounds: Normal breath sounds. No wheezing or rales.   Abdominal:      General: Bowel sounds are normal. There is no distension.      Palpations: Abdomen is soft.      Tenderness: There is no abdominal tenderness.   Musculoskeletal:      Cervical back: Normal range of motion and neck supple.      Right Lower Extremity: Right leg is amputated above knee.      Left Lower Extremity: Left leg is amputated above knee.   Skin:     General: Skin is warm and dry.      Coloration: Skin is not jaundiced or pale.   Neurological:      General: No focal deficit present.      Mental Status: She is alert and oriented to person, place, and time. Mental status is at baseline.   Psychiatric:         Mood and Affect: Mood normal.         Behavior: Behavior normal.          Labs:  I have personally reviewed the following labs.  Labs provided by patient, brought results on paper.  July 2024  Hemoglobin A1c 9.7%  Fasting serum glucose 258  GFR 41    Imaging:  I have personally reviewed the following imaging.   Diabetes Eye Exam  Order: [id number]   Status: Final result       Visible to patient: Yes (seen)       Next appt: 12/19/2024 at 12:50 PM in Endocrinology (Hernandez Rojas DO)    0 Result Notes       1  Topic      Component 12/9/21 10:39 AM   Right Eye Diabetic Retinopathy Moderate   Left Eye Diabetic Retinopathy Moderate               Last Resulted: 12/09/21 10:39 AM                Ryan Roberto MD  Endocrinology Fellow, PGY-4

## 2024-09-16 NOTE — ASSESSMENT & PLAN NOTE
Lab Results   Component Value Date    HGBA1C 9.8 (H) 10/25/2022     Uncontrolled type 2 diabetes mellitus with hyperglycemia and complications of retinopathy, nephropathy  Regimen includes: Lantus 35 units daily, dulaglutide 3 mg weekly, sitagliptin 50 mg daily  Glucose checks-daily, provided by facility.  Has persistent daily fasting and postprandial hyperglycemia up to the mid 300s.  Above 200, experiences polydipsia.  Placed a CGM around 3 days ago.  No data available.  Diet-high in carbohydrate intake  Labs-July 2024 A1c is 9.7%, fasting glucose 258, GFR is 41  Pending diabetic eye exam    Regimen changes-increase Lantus to 42 units daily, Trulicity to 4.5 mg weekly and discontinue sitagliptin at this time.  Requested that her facility fax in a 2-week log of blood sugars once she increases the Trulicity to 4.5 mg weekly.  Based on sugar log will assess the need for mealtime insulin.  Goal A1c for patient is 8%, given her history of PAD and multiple core morbidities  Labs to check at the end of October include hemoglobin A1c, albumin creatinine ratio, and CMP  Lifestyle modifications recommended decreasing carb portions and adding vegetables to her meals.  Patient at this time was offered referral to diabetic education but not interested.  Recommended the patient's schedule a follow-up appointment for diabetic eye exam  Follow-up in 3 months   No

## 2024-10-16 ENCOUNTER — NURSING HOME VISIT (OUTPATIENT)
Dept: GERIATRICS | Facility: OTHER | Age: 64
End: 2024-10-16
Payer: COMMERCIAL

## 2024-10-16 DIAGNOSIS — F33.9 DEPRESSION, RECURRENT (HCC): ICD-10-CM

## 2024-10-16 DIAGNOSIS — R94.31 PROLONGED Q-T INTERVAL ON ECG: ICD-10-CM

## 2024-10-16 DIAGNOSIS — Z86.74 HISTORY OF CARDIAC ARREST: ICD-10-CM

## 2024-10-16 DIAGNOSIS — I73.9 PAD (PERIPHERAL ARTERY DISEASE) (HCC): Chronic | ICD-10-CM

## 2024-10-16 DIAGNOSIS — E11.65 TYPE 2 DIABETES MELLITUS WITH HYPERGLYCEMIA, WITH LONG-TERM CURRENT USE OF INSULIN (HCC): Primary | ICD-10-CM

## 2024-10-16 DIAGNOSIS — D72.823 LEUKEMOID REACTION: ICD-10-CM

## 2024-10-16 DIAGNOSIS — K21.9 GASTROESOPHAGEAL REFLUX DISEASE, UNSPECIFIED WHETHER ESOPHAGITIS PRESENT: ICD-10-CM

## 2024-10-16 DIAGNOSIS — E03.9 ACQUIRED HYPOTHYROIDISM: ICD-10-CM

## 2024-10-16 DIAGNOSIS — I10 PRIMARY HYPERTENSION: ICD-10-CM

## 2024-10-16 DIAGNOSIS — E11.42 DIABETIC PERIPHERAL NEUROPATHY (HCC): ICD-10-CM

## 2024-10-16 DIAGNOSIS — Z79.4 TYPE 2 DIABETES MELLITUS WITH HYPERGLYCEMIA, WITH LONG-TERM CURRENT USE OF INSULIN (HCC): Primary | ICD-10-CM

## 2024-10-16 DIAGNOSIS — E78.49 FAMILIAL COMBINED HYPERLIPIDEMIA: ICD-10-CM

## 2024-10-16 DIAGNOSIS — R05.1 ACUTE COUGH: ICD-10-CM

## 2024-10-16 DIAGNOSIS — D50.8 IRON DEFICIENCY ANEMIA SECONDARY TO INADEQUATE DIETARY IRON INTAKE: ICD-10-CM

## 2024-10-16 DIAGNOSIS — E21.3 HYPERPARATHYROIDISM (HCC): ICD-10-CM

## 2024-10-16 DIAGNOSIS — Z86.73 HISTORY OF CVA (CEREBROVASCULAR ACCIDENT): ICD-10-CM

## 2024-10-16 LAB — HBA1C MFR BLD HPLC: 9.4 %

## 2024-10-16 PROCEDURE — 99309 SBSQ NF CARE MODERATE MDM 30: CPT | Performed by: STUDENT IN AN ORGANIZED HEALTH CARE EDUCATION/TRAINING PROGRAM

## 2024-10-17 PROBLEM — R05.1 ACUTE COUGH: Status: ACTIVE | Noted: 2024-10-17

## 2024-10-17 PROBLEM — M86.9 OSTEOMYELITIS (HCC): Status: RESOLVED | Noted: 2023-01-24 | Resolved: 2024-10-17

## 2024-10-17 NOTE — ASSESSMENT & PLAN NOTE
Lab Results   Component Value Date    HGBA1C 9.8 (H) 10/25/2022     Patient is s/p bilateral AKA  Neuropathy concern seems controlled/stable  Gabapentin has been weaned and she appears stable without it

## 2024-10-17 NOTE — ASSESSMENT & PLAN NOTE
Last TSH WNL from April 2024  Continue levothyroxine 50mcg  No apparent acute/associated symptoms   Monitor TSH periodically; Adjust dosing as appropriate  Consider Endocrine consult if persistently abnormal or symptoms develop. Patient is following Endocrine outpatient

## 2024-10-17 NOTE — ASSESSMENT & PLAN NOTE
Monitor BP - recently generally stable around 110s-140s systolic, rarely higher  Pulse generally 60s-80s  Avoid hypotension  No acute cardiac complaints  Continue regimen including amlodipine 10mg daily, lisinopril 40mg daily, metoprolol tartrate 50mg BID, HCTZ 25mg daily, hydralazine 100mg TID, amiodarone 100mg daily  Consider weaning/adjusting extensive regimen as appropriate, especially if BP remains well controlled or becomes softer. For now seems stable on current regimen  Following Cardiology outpatient

## 2024-10-17 NOTE — ASSESSMENT & PLAN NOTE
mood stable and well controlled  Psych following  Supportive care  Continue regimen including  Continue buproprion 150 mg po daily  Continue sertraline 100 mg po daily at bedtime  (Was on quetiapine, has been weaned and discontinued as of April 2024 and appears tolerating well without it)

## 2024-10-17 NOTE — ASSESSMENT & PLAN NOTE
Pt had right CVA 11/14/2022  Continue aspirin, statin  Pt has been off plavix since 03/2023 after vascular visit   Back pain

## 2024-10-17 NOTE — ASSESSMENT & PLAN NOTE
She notes since about a week she has had a mild stuffy nose and intermittent cough (mostly dry, sometimes a bit of phlegm), the cough is mild, stable, worse in the morning or when laying flat; no associated SOB/wheeze, fever, orthopnea. She does not feel acutely sick, feverish, or confused, she thinks she issue might be from mild allergies or a mild recent viral illness. She thinks supportive care including flonase and robitussin recently have been very helpful. No SOB, she feels she is breathing fine on room air, no orthopnea.   Differential would include allergies, recent mild viral illness, GERD  Continue PPI for GERD, consider titrating dose if issue persists/worsens  Continue flonase and robutussin for another week or so  Consider further workup if persistent/worsening  Supportive care  Overall reported as stable/improving at this time and with no alarming associated respiratory complaints

## 2024-10-17 NOTE — ASSESSMENT & PLAN NOTE
Lab Results   Component Value Date    HGBA1C 9.8 (H) 10/25/2022       Not yet well controlled  Encourage CCD  Monitor glucose routinely - recent fasting sugars have generally been mid-high 100s, improved from prior  Avoid hypoglycemia  Encourage lifestyle modifications including healthy diet, weight management, exercise as appropriate  Following Endocrine outpatient  Continue Lantus, being titrated and recently increased to 42u daily, titrate further as appropriate  Trulicity recently increased to 4.5mg subQ weekly  Started on Januvia 50mg daily as of July 2024, was discontinued per Endocrine Sept 2024 with titration of other medications  Follow up HgbA1c Q3 months July/Oct/Jan/April

## 2024-10-17 NOTE — PROGRESS NOTES
[facility]  Facility: [facility]    PROGRESS NOTE  Nursing Home Place of Service: nursing home place of service: POS 32 Unskilled- No Part A Coverage    NAME: Dianna Varghese  : 1960 AGE: 64 y.o. SEX: female MRN: [id number]  DATE OF ENCOUNTER: 10/16/2024    Assessment and Plan     Problem List Items Addressed This Visit       Esophageal reflux     -seems stable although recent cough may be related  -continue PRN calcium carbonate and PPI (pantoprazole 20mg daily)  -recommend OOB with meals, sit upright for at least 30 minutes afterwards, avoid trigger foods  -continue to monitor  -follow up with GI as appropriate/needed         Depression, recurrent (HCC)     mood stable and well controlled  Psych following  Supportive care  Continue regimen including  Continue buproprion 150 mg po daily  Continue sertraline 100 mg po daily at bedtime  (Was on quetiapine, has been weaned and discontinued as of 2024 and appears tolerating well without it)         Hypertension     Monitor BP - recently generally stable around 110s-140s systolic, rarely higher  Pulse generally 60s-80s  Avoid hypotension  No acute cardiac complaints  Continue regimen including amlodipine 10mg daily, lisinopril 40mg daily, metoprolol tartrate 50mg BID, HCTZ 25mg daily, hydralazine 100mg TID, amiodarone 100mg daily  Consider weaning/adjusting extensive regimen as appropriate, especially if BP remains well controlled or becomes softer. For now seems stable on current regimen  Following Cardiology outpatient         Diabetic peripheral neuropathy ([facility])       Lab Results   Component Value Date    HGBA1C 9.8 (H) 10/25/2022     Patient is s/p bilateral AKA  Neuropathy concern seems controlled/stable  Gabapentin has been weaned and she appears stable without it         Familial combined hyperlipidemia     Continue statin          Type 2 diabetes mellitus with hyperglycemia, with long-term current use of insulin ([facility]) -  Primary         Lab Results   Component Value Date    HGBA1C 9.8 (H) 10/25/2022       Not yet well controlled  Encourage CCD  Monitor glucose routinely - recent fasting sugars have generally been mid-high 100s, improved from prior  Avoid hypoglycemia  Encourage lifestyle modifications including healthy diet, weight management, exercise as appropriate  Following Endocrine outpatient  Continue Lantus, being titrated and recently increased to 42u daily, titrate further as appropriate  Trulicity recently increased to 4.5mg subQ weekly  Started on Januvia 50mg daily as of July 2024, was discontinued per Endocrine Sept 2024 with titration of other medications  Follow up HgbA1c Q3 months July/Oct/Jan/April         Hyperparathyroidism (HCC)     Seems to be primary on chart review  Continue multivitamin/vit D  Monitor calcium on routine labs - has been stable/WNL recently  Has followed with Endocrinology, consider updated visit/consultation if issue becomes symptomatic or worsens         PAD (peripheral artery disease) (HCC) (Chronic)     S/p left AKA on 11/23/22  S/p right AKA stump revision on 01/27/23  Continue aspirin, statin, Eliquis  Working with rehab regarding prosthetics, PT/OT as appropriate  Follow up with Vascular as appropriate         Anemia     Seems fairly mild, stable on lab review  -monitor on routine labs  -borderline microcytic and hx of low iron on lab review - continue ferrous sulfate PO qOD  -likely component of CKD as well  -monitor for acute bleed - no present signs  -consider further workup, Heme consult if persistent/worsening  -transfuse PRN Hb <7  -low threshold to hold anticoagulation if evidence of bleed or worsening Hb         History of CVA (cerebrovascular accident)     Pt had right CVA 11/14/2022  Continue aspirin, statin  Pt has been off plavix since 03/2023 after vascular visit         Prolonged Q-T interval on ECG     Noted on prior EKGs  No apparent acute/associated symptoms  Avoid QT  prolonging medications as able  Following with Cardiology         Acquired hypothyroidism     Last TSH WNL from April 2024  Continue levothyroxine 50mcg  No apparent acute/associated symptoms   Monitor TSH periodically; Adjust dosing as appropriate  Consider Endocrine consult if persistently abnormal or symptoms develop. Patient is following Endocrine outpatient         History of cardiac arrest     S/p cardiac arrest on 1/18/2023 considered due to electrolyte abnormalities/severe sepsis  No recent/acute cardiac complaints  Continue cardiac regimen including aspirin, Eliquis, amiodarone, atorvastatin, beta blocker  Follow up with Cardiology as appropriate/yearly; had recent Cardio visit 4/8/24 and considered stable         Leukemoid reaction     Patient with persistent mildly elevated WBC since around June 2024  Seems to have coincided with likely viral URI and associated diarrhea around that time  Monitor on routine labs - seems fairly mild but persistent as of July 2024  Monitor for acute infectious symptoms - no recent symptoms  Will obtain peripheral smear to eval further at this time  Consider further workup, Heme consult if persistent/worsening         Acute cough     She notes since about a week she has had a mild stuffy nose and intermittent cough (mostly dry, sometimes a bit of phlegm), the cough is mild, stable, worse in the morning or when laying flat; no associated SOB/wheeze, fever, orthopnea. She does not feel acutely sick, feverish, or confused, she thinks she issue might be from mild allergies or a mild recent viral illness. She thinks supportive care including flonase and robitussin recently have been very helpful. No SOB, she feels she is breathing fine on room air, no orthopnea.   Differential would include allergies, recent mild viral illness, GERD  Continue PPI for GERD, consider titrating dose if issue persists/worsens  Continue flonase and robutussin for another week or so  Consider further  workup if persistent/worsening  Supportive care  Overall reported as stable/improving at this time and with no alarming associated respiratory complaints                    Chief Complaint     Follow up 60 day    History of Present Illness     Dianna Varghese is a 64 y.o. female who was seen today for follow up. PMH including bilateral above knee amputations, cardiac arrest, type 2 DM, TIA, major depressive disorder, peripheral vascular disease, hypertension, hyperlipidemia, and GERD     Patient seen and examined in room  Others present: none  Patient laying in bed using smartphone  Appears comfortable, awake, alert, oriented to situation, able to converse appropriately  Polite, Aox3, in very good spirits, appears to be a reasonable historian, mentation appearing similar to my prior visit. Reports she feels very well and good overall recently with no notable acute concerns. She notes since about a week she has had a mild stuffy nose and intermittent cough (mostly dry, sometimes a bit of phlegm), the cough is mild, stable, worse in the morning or when laying flat; no associated SOB/wheeze, fever, orthopnea. She does not feel acutely sick, feverish, or confused, she thinks she issue might be from mild allergies or a mild recent viral illness. She thinks supportive care including flonase and robitussin recently have been very helpful. No SOB, she feels she is breathing fine on room air, no orthopnea. Reports good appetite, no swallowing trouble, no abd pain/N/V, recently BM regular/normal. Urinating well without acute issue. She generally requires andree lift for transfer and utilizes wheelchair, able to self-propel. She has been working with therapy to use her prosthetics for bilateral AKA, notes she has been making progress with using walker with bilateral leg prosthetics and is happy about having had recent evaluation for electric wheelchair. No CP/palpitations including on exertion; does have some chronic stable  "dyspnea on exertion which is unchanged. Denies orthostatic lightheadedness. Sleeping well. No acute pain anywhere. No acute cardiopulmonary, abdominal, or urinary symptoms; see ROS for more details.   No further questions or acute concerns identified.  No acute concerns per NP and nursing.       Lab Review:   1/18/24: CBC with Hb 10.8; CMP with Cr 1.43, eGFR 41 (recent baseline seems around 40s-60s); TSH 8.20, FT4 0.6; A1c 7.7  3/6/24: CBC with Hb 10.8; BMP generally stable, Cr 1.41, eGFR 42; TSH 7.96  4/18/24: CBC with Hb 10.9; CMP with Cr 1.51, eGFR 39; A1c 9.3; TSH WNL  6/3/24: CBC with WBC 11.8, Hb 10.5  6/10/24: CBC with WBC 12.3, Hb 10.5  6/17/24: CBC with WBC 12.1, Hb 10.5  6/26/24: CBC with WBC 10.7, Hb 10.6; BMP generally stable/non-actionable, Cr 1.08, GFR 58; Mg 1.7  7/17/24: CBC with WBC 11.8, Hb 11.0; CMP generally stable/non-actionable, Cr 1.43, eGFR 41; A1c 9.7  8/14/24: BMP with Cr 1.21, GFR 50  8/28/24: CBC with WBC 11.9, Hb 11.3  10/16/24: CBC with WBC 11.2, Hb 11.6; CMP generally stable/non-actionable, Cr 0.99, eGFR 64; A1c 9.4        EKG Jan 2023: NSR, 91bpm, Qtc 511  LEON Jan 2023: EF 60, \"No vegetations or other evidence of endocarditis seen \"       The following portions of the patient's history were reviewed and updated as appropriate: allergies, current medications, past family history, past medical history, past social history, past surgical history and problem list.    Review of Systems     Review of Systems   Constitutional:  Negative for appetite change, chills, diaphoresis and fever.   HENT:  Negative for congestion, drooling, ear pain, hearing loss, rhinorrhea, sore throat and trouble swallowing.    Eyes:  Negative for pain, discharge, redness, itching and visual disturbance.   Respiratory:  Positive for cough. Negative for chest tightness, shortness of breath and wheezing.    Cardiovascular:  Negative for chest pain, palpitations and leg swelling.   Gastrointestinal:  Negative for " abdominal pain, blood in stool, constipation, diarrhea, nausea and vomiting.   Genitourinary:  Negative for difficulty urinating, dysuria and hematuria.   Musculoskeletal:  Positive for gait problem. Negative for arthralgias, back pain and neck pain.   Skin:  Negative for color change.   Neurological:  Negative for dizziness, facial asymmetry, speech difficulty, weakness, light-headedness and headaches.   Psychiatric/Behavioral:  Negative for agitation, behavioral problems and confusion. The patient is not nervous/anxious and is not hyperactive.    All other systems reviewed and are negative.      Active Problem List     Patient Active Problem List   Diagnosis    Esophageal reflux    DM (diabetes mellitus) type II, controlled, with peripheral vascular disorder ([facility])    Class 3 severe obesity due to excess calories with serious comorbidity and body mass index (BMI) of 40.0 to 44.9 in adult ([facility])    Depression, recurrent ([facility])    Anxiety    Hypertension    Diabetic peripheral neuropathy ([facility])    Vitamin D deficiency    Systolic murmur    Familial combined hyperlipidemia    Arthritis    Transaminitis    Moderate protein-calorie malnutrition ([facility])    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 ([facility])    Type 2 diabetes mellitus with hyperglycemia, with long-term current use of insulin ([facility])    Hyperparathyroidism ([facility])    Wound of lower extremity    Non healing left heel wound    Hypokalemia    PAD (peripheral artery disease) ([facility])    Generalized edema due to fluid overload    Anemia    Diabetic ulcer of heel associated with diabetes mellitus due to underlying condition ([facility])    HIT (heparin-induced thrombocytopenia) ([facility])    Diarrhea, unspecified    Mild protein-calorie malnutrition ([facility])    History of CVA (cerebrovascular accident)    Fall    Troponin level elevated    Hypomagnesemia    Prolonged Q-T interval on ECG     Thrombocytosis    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)    Weight gain    S/P bilateral above knee amputation (HCC)    Acquired hypothyroidism    History of cardiac arrest    Advance care planning    Chest congestion    Leukemoid reaction    Acute cough       Objective     Vital Signs:     BP: 155/70 mmHg  10/16/2024 16:10 Temp:97.8 °F  10/7/2024 19:18 Pulse:78 bpm  10/16/2024 09:44 Weight:199 Lbs  10/1/2024 22:30   Resp:18 Breaths/min  10/8/2024 11:33 BS:182 mg/dL   O2:95 %  9/7/2024 14:52 Pain:0         Physical Exam  Vitals reviewed.   Constitutional:       General: She is not in acute distress.     Appearance: She is obese. She is not toxic-appearing or diaphoretic.   HENT:      Head: Normocephalic and atraumatic.      Right Ear: External ear normal.      Left Ear: External ear normal.      Nose: Nose normal. No rhinorrhea.      Mouth/Throat:      Mouth: Mucous membranes are dry.      Pharynx: Oropharynx is clear. No posterior oropharyngeal erythema.   Eyes:      General: No scleral icterus.        Right eye: No discharge.         Left eye: No discharge.      Extraocular Movements: Extraocular movements intact.      Conjunctiva/sclera: Conjunctivae normal.      Pupils: Pupils are equal, round, and reactive to light.   Cardiovascular:      Rate and Rhythm: Normal rate and regular rhythm.   Pulmonary:      Effort: Pulmonary effort is normal. No respiratory distress.      Breath sounds: Normal breath sounds. No wheezing or rales.   Abdominal:      General: Bowel sounds are normal.      Palpations: Abdomen is soft.      Tenderness: There is no abdominal tenderness. There is no guarding.   Musculoskeletal:      Cervical back: No rigidity.      Comments: Bilateral AKA, sites appear well healed without bleed, drainage, or erythema   Skin:     General: Skin is warm and dry.      Coloration: Skin is not jaundiced.   Neurological:      General: No focal deficit present.      Mental Status: She is alert  and oriented to person, place, and time. Mental status is at baseline.   Psychiatric:         Mood and Affect: Mood normal.         Behavior: Behavior normal.         Thought Content: Thought content normal.         Judgment: Judgment normal.         Pertinent Laboratory/Diagnostic Studies:  Laboratory and Imaging studies reviewed. Full report in the paper chart.     Current Medications   Medications reviewed and updated in facility chart.      -Total time spent on this encounter today including documentation and workup review, face to face time, history and exam, and documentation/orders was approximately 40 minutes.  -This note will be copied to [facility] EMR where vitals and medication orders are placed.    Seb Chun D.O.  Geriatric Medicine  10/17/2024 10:19 AM

## 2024-10-17 NOTE — ASSESSMENT & PLAN NOTE
Patient with persistent mildly elevated WBC since around June 2024  Seems to have coincided with likely viral URI and associated diarrhea around that time  Monitor on routine labs - seems fairly mild but persistent as of July 2024  Monitor for acute infectious symptoms - no recent symptoms  Will obtain peripheral smear to eval further at this time  Consider further workup, Heme consult if persistent/worsening

## 2024-10-17 NOTE — ASSESSMENT & PLAN NOTE
-seems stable although recent cough may be related  -continue PRN calcium carbonate and PPI (pantoprazole 20mg daily)  -recommend OOB with meals, sit upright for at least 30 minutes afterwards, avoid trigger foods  -continue to monitor  -follow up with GI as appropriate/needed

## 2024-10-18 ENCOUNTER — HOSPITAL ENCOUNTER (EMERGENCY)
Facility: HOSPITAL | Age: 64
Discharge: HOME | End: 2024-10-18
Payer: COMMERCIAL

## 2024-10-18 ENCOUNTER — TELEPHONE (OUTPATIENT)
Dept: OTHER | Facility: OTHER | Age: 64
End: 2024-10-18

## 2024-10-18 VITALS
OXYGEN SATURATION: 96 % | TEMPERATURE: 97.9 F | RESPIRATION RATE: 16 BRPM | DIASTOLIC BLOOD PRESSURE: 72 MMHG | SYSTOLIC BLOOD PRESSURE: 162 MMHG | HEART RATE: 76 BPM

## 2024-10-18 DIAGNOSIS — G54.6 PHANTOM LIMB PAIN (HCC): Primary | ICD-10-CM

## 2024-10-18 DIAGNOSIS — I10 ASYMPTOMATIC HYPERTENSION: ICD-10-CM

## 2024-10-18 DIAGNOSIS — E11.65 HYPERGLYCEMIA DUE TO DIABETES MELLITUS (HCC): ICD-10-CM

## 2024-10-18 LAB — GLUCOSE SERPL-MCNC: 270 MG/DL (ref 65–140)

## 2024-10-18 PROCEDURE — 99284 EMERGENCY DEPT VISIT MOD MDM: CPT

## 2024-10-18 PROCEDURE — 99285 EMERGENCY DEPT VISIT HI MDM: CPT

## 2024-10-18 PROCEDURE — 82948 REAGENT STRIP/BLOOD GLUCOSE: CPT

## 2024-10-19 NOTE — ED NOTES
RN spoke to Radha at [facility] advising her of pt's  and departure from ED.     Cee Miller RN  10/18/24 [id number]

## 2024-10-19 NOTE — ED PROVIDER NOTES
Time reflects when diagnosis was documented in both MDM as applicable and the Disposition within this note       Time User Action Codes Description Comment    10/18/2024  9:00 PM Patric Yeboah Add [G54.6] Phantom limb pain (HCC)     10/18/2024  9:00 PM Patric Yeboah Add [I10] Asymptomatic hypertension     10/18/2024  9:00 PM Patric Yeboah Add [E11.65] Hyperglycemia due to diabetes mellitus (HCC)           ED Disposition       ED Disposition   Discharge    Condition   Stable    Date/Time   Fri Oct 18, 2024  8:59 PM    Comment   Dianna Varghese discharge to home/self care.                   Assessment & Plan       Medical Decision Making  Patient with history as below presented with vomiting. History obtained from patient.    Differential diagnosis includes: Phantom limb pain, acute anxiety, gastritis    Plan: Reassurance    Patient's symptoms have completely resolved since arrival in the emergency department and she is currently asymptomatic.  Fingerstick glucose was performed by [facility] and elevated.  Instructed patient to follow-up with primary care physician regarding this value.  She does have insulin at her assisted living facility and ability to monitor her blood glucose.  She actually says that this will glucose level is about her normal as per her typical readings.  Her blood pressure was also elevated however she is otherwise asymptomatic so instructed to follow-up with her primary care physician regarding this asymptomatic hypertension.  Suspect her presentation is secondary to phantom limb pain with associated nausea and vomiting.  Patient does take gabapentin however she is unsure of the dosage.  Instructed her to follow-up with the prescribing provider and consideration of additional medications if she gets recurrent symptoms.  Stable for outpatient management.    Disposition: Discharged with instructions to obtain outpatient follow up of patient's symptoms and findings, with strict return  "precautions if patient develops new or worsening symptoms. Patient understands this plan and is agreeable. All questions answered. Patient discharged home with return precautions.             Medications - No data to display    ED Risk Strat Scores                                               History of Present Illness       Chief Complaint   Patient presents with    Vomiting     Ems reports\" pt vomited once today due to phantom pain and anxiety\"       Past Medical History:   Diagnosis Date    Altered mental status 1/24/2023    Anxiety     Depression     Diabetes (HCC)     Diabetes mellitus (HCC)     Diarrhea 11/14/2022    Epigastric pain 2/20/2022    Esophageal reflux     28 APR 2015 RESOLVED    Fall 1/17/2023    Forehead laceration 1/23/2023    Hyperlipidemia     Hypertension     Low back pain     sciatica    Pneumonia 2019    Stroke (HCC) 12/2015    small vessel, L frontal corona radiata. Rx asa 81, Lipitor 40, risk modification    Vitamin D deficiency       Past Surgical History:   Procedure Laterality Date    AMPUTATION ABOVE KNEE (AKA) Right 12/1/2022    Procedure: (AKA), PSB  BKA;  Surgeon: Johnathan Britton DO;  Location: [facility];  Service: Vascular    ARTERIOGRAM Right 11/7/2022    Procedure: -Right lower extremity angiogram -Right CFA balloon angioplasty with [id number]  [employer]  -Right CFA DCB with 6x40 Bard Lutonix -Right CFA atherectomy with Jetstream and distal embolization protection with 7mm Spider FX -Right SFA/Pop angioplasty with 4x40 Chololate balloon -Right SFA/Pop DCB with 5x150 Bard Lutonix -Right SFA stent with 6x80 [employer] Rosalinda x2 -R    COLONOSCOPY      IR AORTAGRAM WITH RUN-OFF  10/31/2022    IR LOWER EXTREMITY ANGIOGRAM  11/10/2022    IR LOWER EXTREMITY ANGIOGRAM  11/7/2022    HI AMP LEG THRU TIBIA&FIBULA SEC CLOSURE/SCAR REV Right 1/27/2023    Procedure: AMPUTATION REVISION STUMP;  Surgeon: Johnathan Britton DO;  [facility];  Service: Vascular    " GA AMPUTATION THIGH THROUGH FEMUR ANY LEVEL Left 2022    Procedure: (AKA);  Surgeon: Silvano Thompson DO;  [facility];  Service: Vascular    GA NEGATIVE PRESSURE WOUND THERAPY DME <= 50 SQ CM Bilateral 2022    Procedure: Right above knee amputation washout; vac change; Left above knee amputation debridement; vac placement;  Surgeon: Johnathan Britton DO;  [facility];  Service: Vascular    WOUND DEBRIDEMENT Right 2022    Procedure: AKA STUMP WASHOUT, Left AKA Staple removal. application of wound vac to Right AKA;  Surgeon: Wan Silva MD;  Location[facility];  Service: Vascular      Family History   Problem Relation Age of Onset    Diabetes Mother     Lung cancer Mother     Cancer Father     Lung cancer Father     Hypertension Brother     Hypertension Family       Social History     Tobacco Use    Smoking status: Former     Current packs/day: 0.00     Types: Cigarettes     Quit date:      Years since quittin.    Smokeless tobacco: Never   Vaping Use    Vaping status: Never Used   Substance Use Topics    Alcohol use: Never     Comment: OCCASIONAL ALCOHOL USE IN ALL SCRIPTS    Drug use: No      E-Cigarette/Vaping    E-Cigarette Use Never User       E-Cigarette/Vaping Substances    Nicotine No     THC No     CBD No     Flavoring No     Other No     Unknown No       I have reviewed and agree with the history as documented.     Patient is a 64-year-old female with a significant past medical history of insulin-dependent type 2 diabetes, bilateral AKA, hypertension, anxiety, presenting for evaluation of phantom limb pain which is since resolved.  Patient reports that prior to arrival she notified her nursing facility that she was having some phantom limb pain, similar to previous although slightly more intense.  This was predominantly on her left side.  She says that EMS was called brought her to the hospital, but she was unaware that they were even alerted.   Patient reports that she got very anxious, nauseous and had an episode of vomiting, similar to previous episodes of anxiety.  Patient reports that her pain has since completely resolved as has her nausea and vomiting.  She denies any chest pain or difficulty breathing.  She denies any abdominal pain.  She reports that she feels completely back to her normal self.  She does take gabapentin to help manage her symptoms, although she is unclear what the dosage is.        Review of Systems   Respiratory:  Negative for shortness of breath.    Cardiovascular:  Negative for chest pain.   Gastrointestinal:  Positive for nausea (resolved) and vomiting (resolved). Negative for abdominal pain.   Genitourinary:  Negative for decreased urine volume.   Neurological:  Negative for weakness and numbness.   Psychiatric/Behavioral:          Phantom leg pain           Objective       ED Triage Vitals   Temperature Pulse Blood Pressure Respirations SpO2 Patient Position - Orthostatic VS   10/18/24 2016 10/18/24 2016 10/18/24 2016 10/18/24 2016 10/18/24 2016 10/18/24 2230   97.9 °F (36.6 °C) 89 (!) 198/90 18 93 % Lying      Temp src Heart Rate Source BP Location FiO2 (%) Pain Score    -- 10/18/24 2230 10/18/24 2230 -- 10/18/24 2016     Monitor Right arm  No Pain      Vitals      Date and Time Temp Pulse SpO2 Resp BP Pain Score FACES Pain Rating User   10/18/24 2230 -- 76 96 % 16 162/72 No Pain --    10/18/24 2016 97.9 °F (36.6 °C) 89 93 % 18 198/90 No Pain -- [place]            Physical Exam  Vitals and nursing note reviewed.   Constitutional:       General: She is not in acute distress.     Appearance: Normal appearance. She is not ill-appearing or toxic-appearing.   HENT:      Head: Normocephalic and atraumatic.      Right Ear: External ear normal.      Left Ear: External ear normal.      Nose: Nose normal.   Eyes:      General: No scleral icterus.        Right eye: No discharge.         Left eye: No discharge.      Extraocular Movements:  Extraocular movements intact.      Conjunctiva/sclera: Conjunctivae normal.   Cardiovascular:      Rate and Rhythm: Normal rate.      Heart sounds: Normal heart sounds. No murmur heard.     No friction rub. No gallop.   Pulmonary:      Effort: Pulmonary effort is normal. No respiratory distress.      Breath sounds: Normal breath sounds.   Abdominal:      General: Abdomen is flat. There is no distension.      Palpations: Abdomen is soft. There is no mass.      Tenderness: There is no abdominal tenderness.   Genitourinary:     Comments: Deferred  Musculoskeletal:      Comments: Bilateral AKA.  Bilateral AKA sites appear clean, dry, intact.  Nontender to palpation.  No skin changes.   Skin:     General: Skin is warm and dry.   Neurological:      General: No focal deficit present.      Mental Status: She is alert.         Results Reviewed       Procedure Component Value Units Date/Time    Fingerstick Glucose (POCT) [[id number]]  (Abnormal) Collected: 10/18/24 2027    Lab Status: Final result Specimen: Blood Updated: 10/18/24 2029     POC Glucose 270 mg/dl             No orders to display       Procedures    ED Medication and Procedure Management   Prior to Admission Medications   Prescriptions Last Dose Informant Patient Reported? Taking?   Continuous Blood Gluc  (FreeStyle Noé 14 Day [place]) LITO  Outside Facility (Specify) No No   Sig: Use 1 Units continuous   Continuous Blood Gluc Sensor (FreeStyle Noé 14 Day Sensor) MISC  Outside Facility (Specify) No No   Sig: Use daily as directed for CGM - Change every 14 days   Insulin Pen Needle (B-D UF III MINI PEN NEEDLES) 31G X 5 MM MISC  Outside Facility (Specify) No No   Sig: USE WITH INSULIN FOUR TIMES DAILY   Lancets 28G [facility]  Outside Facility (Specify) No No   Sig: Use 1 each 4 (four) times a day   Multiple Vitamin (multivitamin) tablet  Outside Facility (Specify) Yes No   Sig: Take 1 tablet by mouth daily   Psyllium 25 % PACK   Yes No   Sig: Take 1 packet by  mouth daily   acetaminophen (TYLENOL) 325 mg tablet  Outside Facility (Specify) Yes No   Sig: Take 650 mg by mouth every 4 (four) hours as needed for mild pain   acetaminophen (TYLENOL) 650 mg suppository  Outside Facility (Specify) Yes No   Sig: Insert 650 mg into the rectum every 4 (four) hours as needed for mild pain   amLODIPine (NORVASC) 10 mg tablet  Outside Facility (Specify) No No   Sig: TAKE 1 TABLET BY MOUTH EVERY DAY   amiodarone 100 mg tablet  Outside Facility (Specify) Yes No   Sig: Take 100 mg by mouth daily   apixaban (ELIQUIS) 5 mg  Outside Facility (Specify) Yes No   Sig: Take 5 mg by mouth 2 (two) times a day   aspirin 81 mg chewable tablet  Outside Facility (Specify) Yes No   Sig: Chew 81 mg daily   atorvastatin (LIPITOR) 40 mg tablet  Outside Facility (Specify) No No   Sig: TAKE 1 TABLET BY MOUTH EVERY DAY   buPROPion (WELLBUTRIN XL) 150 mg 24 hr tablet  Outside Facility (Specify) No No   Sig: Take 1 tablet (150 mg total) by mouth daily   calcium carbonate (TUMS) 500 mg chewable tablet  Outside Facility (Specify) Yes No   Sig: Chew 2 tablets every 6 (six) hours as needed for indigestion or heartburn   dulaglutide (Trulicity) 4.5 MG/0.5ML injection   No No   Sig: Inject 0.5 mL (4.5 mg total) under the skin every 7 days - Start after completing 4 weeks of dulaglutide 3 mg weekly for 4 weeks. If tolerating, start dulaglutide 4.5 mg weekly.   ferrous sulfate 325 (65 Fe) mg tablet   Yes No   Sig: Take 325 mg by mouth daily with breakfast   hydrALAZINE (APRESOLINE) 100 MG tablet  Outside Facility (Specify) No No   Sig: Take 1 tablet (100 mg total) by mouth every 8 (eight) hours   hydrochlorothiazide (HYDRODIURIL) 12.5 mg tablet  Outside Facility (Specify) No No   Sig: Take 1 tablet (12.5 mg total) by mouth daily Do not start before February 4, 2023.   Patient taking differently: Take 25 mg by mouth daily   insulin glargine (LANTUS) 100 units/mL subcutaneous injection   No No   Sig: Inject 42 Units  under the skin every morning   levothyroxine 50 mcg tablet  Outside Facility (Specify) Yes No   Sig: Take 50 mcg by mouth daily   lisinopril (ZESTRIL) 40 mg tablet  Outside Facility (Specify) No No   Sig: TAKE 1 TABLET BY MOUTH EVERY DAY   metoprolol tartrate (LOPRESSOR) 50 mg tablet  Outside Facility (Specify) No No   Sig: Take 1 tablet (50 mg total) by mouth every 12 (twelve) hours   pantoprazole (PROTONIX) 20 mg tablet   Yes No   Sig: Take 20 mg by mouth daily   polyethylene glycol (MIRALAX) 17 g packet  Outside Facility (Specify) Yes No   Sig: Take 17 g by mouth daily as needed for constipation   saccharomyces boulardii (FLORASTOR) 250 mg capsule   Yes No   Sig: Take 250 mg by mouth 2 (two) times a day   sertraline (ZOLOFT) 100 mg tablet  Outside Facility (Specify) No No   Sig: TAKE 1 TABLET BY MOUTH EVERY DAY   thiamine (VITAMIN B1) 100 mg tablet  Outside Facility (Specify) No No   Sig: Take 1 tablet (100 mg total) by mouth daily Do not start before February 4, 2023.   travoprost (TRAVATAN-Z) 0.004 % ophthalmic solution  Outside Facility (Specify) Yes No   Sig: Administer 1 drop into the left eye daily at bedtime      Facility-Administered Medications: None     Discharge Medication List as of 10/18/2024  9:01 PM        CONTINUE these medications which have NOT CHANGED    Details   acetaminophen (TYLENOL) 325 mg tablet Take 650 mg by mouth every 4 (four) hours as needed for mild pain, Historical Med      acetaminophen (TYLENOL) 650 mg suppository Insert 650 mg into the rectum every 4 (four) hours as needed for mild pain, Historical Med      amiodarone 100 mg tablet Take 100 mg by mouth daily, Historical Med      amLODIPine (NORVASC) 10 mg tablet TAKE 1 TABLET BY MOUTH EVERY DAY, Normal      apixaban (ELIQUIS) 5 mg Take 5 mg by mouth 2 (two) times a day, Historical Med      aspirin 81 mg chewable tablet Chew 81 mg daily, Historical Med      atorvastatin (LIPITOR) 40 mg tablet TAKE 1 TABLET BY MOUTH EVERY DAY,  Normal      buPROPion (WELLBUTRIN XL) 150 mg 24 hr tablet Take 1 tablet (150 mg total) by mouth daily, Starting Thu 1/12/2023, No Print      calcium carbonate (TUMS) 500 mg chewable tablet Chew 2 tablets every 6 (six) hours as needed for indigestion or heartburn, Historical Med      Continuous Blood Gluc  (FreeStyle Noé 14 Day [place]) LITO Use 1 Units continuous, Starting Wed 8/25/2021, Normal      Continuous Blood Gluc Sensor (FreeStyle Noé 14 Day Sensor) MISC Use daily as directed for CGM - Change every 14 days, Normal      dulaglutide (Trulicity) 4.5 MG/0.5ML injection Inject 0.5 mL (4.5 mg total) under the skin every 7 days - Start after completing 4 weeks of dulaglutide 3 mg weekly for 4 weeks. If tolerating, start dulaglutide 4.5 mg weekly., Starting Mon 9/16/2024, Normal      ferrous sulfate 325 (65 Fe) mg tablet Take 325 mg by mouth daily with breakfast, Historical Med      hydrALAZINE (APRESOLINE) 100 MG tablet Take 1 tablet (100 mg total) by mouth every 8 (eight) hours, Starting Fri 2/3/2023, No Print      hydrochlorothiazide (HYDRODIURIL) 12.5 mg tablet Take 1 tablet (12.5 mg total) by mouth daily Do not start before February 4, 2023., Starting Sat 2/4/2023, No Print      insulin glargine (LANTUS) 100 units/mL subcutaneous injection Inject 42 Units under the skin every morning, Normal      Insulin Pen Needle (B-D UF III MINI PEN NEEDLES) 31G X 5 MM MISC USE WITH INSULIN FOUR TIMES DAILY, Normal      Lancets 28G MISC Use 1 each 4 (four) times a day, Starting Mon 6/14/2021, Normal      levothyroxine 50 mcg tablet Take 50 mcg by mouth daily, Historical Med      lisinopril (ZESTRIL) 40 mg tablet TAKE 1 TABLET BY MOUTH EVERY DAY, Normal      metoprolol tartrate (LOPRESSOR) 50 mg tablet Take 1 tablet (50 mg total) by mouth every 12 (twelve) hours, Starting Fri 2/3/2023, No Print      Multiple Vitamin (multivitamin) tablet Take 1 tablet by mouth daily, Historical Med      pantoprazole (PROTONIX) 20  mg tablet Take 20 mg by mouth daily, Historical Med      polyethylene glycol (MIRALAX) 17 g packet Take 17 g by mouth daily as needed for constipation, Historical Med      Psyllium 25 % PACK Take 1 packet by mouth daily, Historical Med      saccharomyces boulardii (FLORASTOR) 250 mg capsule Take 250 mg by mouth 2 (two) times a day, Historical Med      sertraline (ZOLOFT) 100 mg tablet TAKE 1 TABLET BY MOUTH EVERY DAY, Normal      thiamine (VITAMIN B1) 100 mg tablet Take 1 tablet (100 mg total) by mouth daily Do not start before February 4, 2023., Starting Sat 2/4/2023, No Print      travoprost (TRAVATAN-Z) 0.004 % ophthalmic solution Administer 1 drop into the left eye daily at bedtime, Historical Med           No discharge procedures on file.  ED SEPSIS DOCUMENTATION   Time reflects when diagnosis was documented in both MDM as applicable and the Disposition within this note       Time User Action Codes Description Comment    10/18/2024  9:00 PM Patric Yeboah [G54.6] Phantom limb pain (HCC)     10/18/2024  9:00 PM Patric Yeboah [I10] Asymptomatic hypertension     10/18/2024  9:00 PM Patric Yeboah [E11.65] Hyperglycemia due to diabetes mellitus (HCC)                  Patric Yeboah DO  10/19/24 0242

## 2024-10-19 NOTE — ED NOTES
RN spoke to Ema at [facility] and advised her of the pt's return to the nursing home.     Cee Miller RN  10/18/24 2881

## 2024-10-19 NOTE — DISCHARGE INSTRUCTIONS
Your evaluation suggests that your symptoms are due to a non emergent cause.    The limb pain is due to phantom limb pain. Please discuss possibly increasing your gabapentin or other medication changes to help with this.    Follow up with your primary care physician regarding your blood pressure and glucose which were both high here today.    Please follow up with your primary care physician within two days.    Return to the Emergency Department if you experience worsening or concerning symptoms.    Thank you for choosing us for your care.

## 2024-10-21 ENCOUNTER — NURSING HOME VISIT (OUTPATIENT)
Dept: GERIATRICS | Facility: OTHER | Age: 64
End: 2024-10-21
Payer: COMMERCIAL

## 2024-10-21 VITALS
DIASTOLIC BLOOD PRESSURE: 67 MMHG | TEMPERATURE: 97 F | HEART RATE: 75 BPM | OXYGEN SATURATION: 90 % | RESPIRATION RATE: 16 BRPM | SYSTOLIC BLOOD PRESSURE: 137 MMHG | WEIGHT: 199 LBS | BODY MASS INDEX: 35.25 KG/M2

## 2024-10-21 DIAGNOSIS — E11.42 DIABETIC PERIPHERAL NEUROPATHY (HCC): ICD-10-CM

## 2024-10-21 DIAGNOSIS — I10 PRIMARY HYPERTENSION: ICD-10-CM

## 2024-10-21 DIAGNOSIS — Z89.612 S/P BILATERAL ABOVE KNEE AMPUTATION (HCC): Primary | ICD-10-CM

## 2024-10-21 DIAGNOSIS — Z89.611 S/P BILATERAL ABOVE KNEE AMPUTATION (HCC): Primary | ICD-10-CM

## 2024-10-21 PROCEDURE — 99308 SBSQ NF CARE LOW MDM 20: CPT

## 2024-10-21 RX ORDER — GABAPENTIN 100 MG/1
100 CAPSULE ORAL
COMMUNITY

## 2024-10-21 NOTE — ASSESSMENT & PLAN NOTE
"Due to significant PAD and complications after attempted revascularizations resident required bilateral AKA  Left leg done on 11/23/22  Right leg done on 12/01/22  Started with phantom leg pain on the left side on 10/18  Stated \"my leg was twitching, I need the aide to hold my leg down\"  Was sent to [facility] ER for evaluation  Recently gabapentin had been discontinued  Will restart gabapentin 100 mg po QHS  Continue to monitor  Continue follow up with Vascular  "

## 2024-10-21 NOTE — ASSESSMENT & PLAN NOTE
Lab Results   Component Value Date    HGBA1C 9.8 (H) 10/25/2022     S/P bilateral AKA  Recent ER visit for phantom limb pain  Will restart gabapentin 100 mg po QHS  Previously on gabapentin 200 mg po BID  Continue to monitor skin daily

## 2024-10-21 NOTE — ASSESSMENT & PLAN NOTE
Review of blood pressures from facility are stable  SBP generally 110s-140's  Recent ER visit noted to have elevated blood pressure  Resident believes it was her anxiety that made her have an elevated BP   Encourage medication adherence   Continue weekly blood pressure checks  Encourage heart healthy diet  Continue amlodipine 10 mg po daily  Continue lisinopril 40 mg po daily  Continue metoprolol tartrate 50 mg po Q12h  Continue hydralazine 100 mg po Q8h  Continue hydrochlorothiazide 25 mg po daily  Avoid hypotension  Follow up with cardiology

## 2024-10-21 NOTE — PROGRESS NOTES
"[address], [address], [place] [address]  [phone]    NAME: Dianna Varghese  AGE: 64 y.o. SEX: female    Progress Note    Location: [facility]  POS: 32 ([facility])  Code Status: Full Code    Chief complaint / Reason for visit: Acute visit-ER return    Assessment/Plan:    S/P bilateral above knee amputation (HCC)  Due to significant PAD and complications after attempted revascularizations resident required bilateral AKA  Left leg done on 11/23/22  Right leg done on 12/01/22  Started with phantom leg pain on the left side on 10/18  Stated \"my leg was twitching, I need the aide to hold my leg down\"  Was sent to [facility] ER for evaluation  Recently gabapentin had been discontinued  Will restart gabapentin 100 mg po QHS  Continue to monitor  Continue follow up with Vascular    Diabetic peripheral neuropathy (HCC)    Lab Results   Component Value Date    HGBA1C 9.8 (H) 10/25/2022     S/P bilateral AKA  Recent ER visit for phantom limb pain  Will restart gabapentin 100 mg po QHS  Previously on gabapentin 200 mg po BID  Continue to monitor skin daily    Hypertension  Review of blood pressures from facility are stable  SBP generally 110s-140's  Recent ER visit noted to have elevated blood pressure  Resident believes it was her anxiety that made her have an elevated BP   Encourage medication adherence   Continue weekly blood pressure checks  Encourage heart healthy diet  Continue amlodipine 10 mg po daily  Continue lisinopril 40 mg po daily  Continue metoprolol tartrate 50 mg po Q12h  Continue hydralazine 100 mg po Q8h  Continue hydrochlorothiazide 25 mg po daily  Avoid hypotension  Follow up with cardiology        This is a 64 y.o. female seen today at [facility].  Medical history includes, not limited to, bilateral above knee amputations, cardiac arrest, type 2 DM, TIA, major depressive disorder, peripheral vascular disease, hypertension, hyperlipidemia, and GERD.    Resident " seen today for an acute visit.  This past Friday the resident was experiencing severe phantom limb pain, mainly in her left lower extremity.  She states she has had this in the past but not as bad.  She was experiencing twitching and at one point asked the CNA to help hold her leg down.  She was sent to [facility] ER for further evaluation.  In the ED she was noticed to have an elevated BP and glucose.  The resident states that during the day she did drink multiple caffeinated drinks during the day and had anxiety when in the ED.  Upon entering the room today, the resident is laying in bed comfortably.  She denies pain.  States the phantom pain had gone away.  Discussed adding the gabapentin back onto her medication regimen.          Reviewed resident with nursing staff.  New orders placed.            Nursing and prior provider notes reviewed on this visit. Discussed visit with PCP and nursing staff/ supervisor.      Review of Systems   Constitutional:  Positive for activity change. Negative for appetite change, fatigue, fever and unexpected weight change.   Eyes:  Negative for pain, discharge and visual disturbance.   Respiratory:  Negative for shortness of breath.    Cardiovascular:  Negative for chest pain and palpitations.   Gastrointestinal:  Negative for abdominal pain and constipation.   Genitourinary:  Negative for difficulty urinating, dysuria, hematuria and urgency.   Musculoskeletal:  Positive for gait problem. Negative for arthralgias.   Neurological:  Negative for syncope and weakness.   Psychiatric/Behavioral:  Negative for confusion and sleep disturbance.    All other systems reviewed and are negative.         ALLERGY: Reviewed, unchanged  Allergies   Allergen Reactions    Heparin Other (See Comments)     History of HIT       HISTORY:  Medical Hx: Reviewed, unchanged  Family Hx: Reviewed, unchanged  Soc Hx: Reviewed,  unchanged      Physical Exam  Vitals reviewed.   Constitutional:       General: She is not  in acute distress.     Appearance: Normal appearance. She is not ill-appearing.   HENT:      Head: Normocephalic.      Right Ear: Tympanic membrane normal.      Left Ear: Tympanic membrane normal.      Mouth/Throat:      Mouth: Mucous membranes are moist.      Pharynx: Oropharynx is clear.   Eyes:      General:         Right eye: No discharge.         Left eye: No discharge.      Extraocular Movements: Extraocular movements intact.      Conjunctiva/sclera: Conjunctivae normal.      Pupils: Pupils are equal, round, and reactive to light.   Cardiovascular:      Rate and Rhythm: Normal rate and regular rhythm.      Pulses: Normal pulses.      Heart sounds: Normal heart sounds.   Pulmonary:      Effort: Pulmonary effort is normal. No respiratory distress.   Chest:      Chest wall: No tenderness.   Abdominal:      General: Bowel sounds are normal.      Palpations: Abdomen is soft.      Tenderness: There is no abdominal tenderness.   Musculoskeletal:         General: Deformity present. No swelling. Normal range of motion.      Cervical back: Normal range of motion.      Right lower leg: No edema.      Left lower leg: No edema.      Comments: Bilateral AKA   Skin:     General: Skin is warm and dry.   Neurological:      Mental Status: She is alert and oriented to person, place, and time. Mental status is at baseline.      Motor: No weakness.   Psychiatric:         Mood and Affect: Mood normal.         Behavior: Behavior normal.         Thought Content: Thought content normal.         Judgment: Judgment normal.            Laboratory results / Imaging reviewed: Hard copy/ies in medical chart:      Current Medications:  All medications reviewed and updated in Nursing Home Chart    Please note: This note was completed in part utilizing a voice-recognition software may have been used in the preparation of this document. Grammatical errors, random word insertion, spelling mistakes, and incomplete sentences may be an occasional  "consequence of the system secondary to software limitations, ambient noise and hardware issues. Occasional wrong word or \"sound-alike\" substitutions may have occurred due to the inherent limitations of voice recognition software. At the time of dictation, efforts were made to edit, clarify and/or correct errors. Interpretation should be guided by context. Please read the chart carefully and recognize, using context, where substitutions have occurred. If you have any questions or concerns about the context, text or information contained within the body of this dictation, please contact myself, the provider, for further clarification.      ALEX Luevano  10/21/2024  "

## 2024-11-07 ENCOUNTER — TELEPHONE (OUTPATIENT)
Dept: HEMATOLOGY ONCOLOGY | Facility: CLINIC | Age: 64
End: 2024-11-07

## 2024-11-12 ENCOUNTER — NURSING HOME VISIT (OUTPATIENT)
Dept: GERIATRICS | Facility: OTHER | Age: 64
End: 2024-11-12

## 2024-11-12 VITALS — RESPIRATION RATE: 18 BRPM | HEART RATE: 77 BPM | DIASTOLIC BLOOD PRESSURE: 68 MMHG | SYSTOLIC BLOOD PRESSURE: 122 MMHG

## 2024-11-12 DIAGNOSIS — Z79.4 TYPE 2 DIABETES MELLITUS WITH HYPERGLYCEMIA, WITH LONG-TERM CURRENT USE OF INSULIN (HCC): ICD-10-CM

## 2024-11-12 DIAGNOSIS — E03.9 ACQUIRED HYPOTHYROIDISM: ICD-10-CM

## 2024-11-12 DIAGNOSIS — I10 PRIMARY HYPERTENSION: Primary | ICD-10-CM

## 2024-11-12 DIAGNOSIS — E11.65 TYPE 2 DIABETES MELLITUS WITH HYPERGLYCEMIA, WITH LONG-TERM CURRENT USE OF INSULIN (HCC): ICD-10-CM

## 2024-11-12 DIAGNOSIS — F41.9 ANXIETY: ICD-10-CM

## 2024-11-12 NOTE — ASSESSMENT & PLAN NOTE
-Stable .  -Continue current regimen of bupropion 150 mg po daily  -Continue Zoloft 100 mg po daily.  -Follow up with psychiatry as needed or scheduled.

## 2024-11-12 NOTE — ASSESSMENT & PLAN NOTE
-Current blood sugar ranges in am 160-170  -Continue Lantus 42 units daily   -Continue Trulicity 4.5 mg subcutaneous weekly   -Follow up HGA1C as scheduled.  -Follow up with Endocrinology as scheduled

## 2024-11-12 NOTE — PROGRESS NOTES
Nursing Home Visit  Name: Dianna Varghese      : 1960      MRN: [id number]  Encounter Provider: LAEX Joseph  Encounter Date: 2024   Encounter department: [facility]IRTUAL    POS-32  Assessment & Plan  Primary hypertension  Current blood pressure is stable at 122/68. Patient is asymptomatic. Current blood pressure range is systolic 122-165 and diastolic 60-80 range.  -Continue current blood pressure medication for  hypertension management    Lisinopril 40 mg po daily    Amlodipine 10 mg po daily   Hydrochlorothiazide 25 mg po daily  Hydralazine 100 mg po every 8 hours.  [id number] mg po daily.  -Monitor blood pressure as scheduled.  -Avoid hypotension  -Follow up with cardiology as scheduled.         Type 2 diabetes mellitus with hyperglycemia, with long-term current use of insulin (HCC)    -Current blood sugar ranges in am 160-170  -Continue Lantus 42 units daily   -Continue Trulicity 4.5 mg subcutaneous weekly   -Follow up HGA1C as scheduled.  -Follow up with Endocrinology as scheduled          Anxiety  -Stable .  -Continue current regimen of bupropion 150 mg po daily  -Continue Zoloft 100 mg po daily.  -Follow up with psychiatry as needed or scheduled.         Acquired hypothyroidism  Continue current dose of Levothyroxine  50 mcg po daily.  - Monitor TSH as scheduled and adjust dose as needed.               History of Present Illness     Dianna Varghese is a 64 y.o. female who presents for follow up care of chronic conditions. Patient was received in her room alert, awake and verbal. Physical assessment was completed with no concerns offered by patient.       Review of Systems   Constitutional:  Negative for activity change and fever.   Respiratory:  Negative for cough and shortness of breath.    Cardiovascular:  Negative for chest pain and palpitations.   Gastrointestinal:  Negative for constipation.   Genitourinary:  Negative for dysuria.   Musculoskeletal:  Negative  for arthralgias.   Neurological:  Negative for dizziness, syncope and headaches.   Psychiatric/Behavioral:  Negative for sleep disturbance. The patient is not nervous/anxious.      Pertinent Medical History     Past Medical History   Past Medical History:   Diagnosis Date    Altered mental status 1/24/2023    Anxiety     Depression     Diabetes (HCC)     Diabetes mellitus (HCC)     Diarrhea 11/14/2022    Epigastric pain 2/20/2022    Esophageal reflux     28 APR 2015 RESOLVED    Fall 1/17/2023    Forehead laceration 1/23/2023    Hyperlipidemia     Hypertension     Low back pain     sciatica    Pneumonia 2019    Stroke (HCC) 12/2015    small vessel, L frontal corona radiata. Rx asa 81, Lipitor 40, risk modification    Vitamin D deficiency      Past Surgical History:   Procedure Laterality Date    AMPUTATION ABOVE KNEE (AKA) Right 12/1/2022    Procedure: (AKA), PSB  BKA;  Surgeon: Johnathan Britton DO;  Location: [facility];  Service: Vascular    ARTERIOGRAM Right 11/7/2022    Procedure: -Right lower extremity angiogram -Right CFA balloon angioplasty with [id number]  [employer]  -Right CFA DCB with 6x40 Bard Lutonix -Right CFA atherectomy with Jetstream and distal embolization protection with 7mm Spider FX -Right SFA/Pop angioplasty with 4x40 Chololate balloon -Right SFA/Pop DCB with 5x150 Bard Lutonix -Right SFA stent with 6x80 [employer] Rosalinda x2 -R    COLONOSCOPY      IR AORTAGRAM WITH RUN-OFF  10/31/2022    IR LOWER EXTREMITY ANGIOGRAM  11/10/2022    IR LOWER EXTREMITY ANGIOGRAM  11/7/2022    NY AMP LEG THRU TIBIA&FIBULA SEC CLOSURE/SCAR REV Right 1/27/2023    Procedure: AMPUTATION REVISION STUMP;  Surgeon: Johnathan Britton DO;  Location: [facility];  Service: Vascular    NY AMPUTATION THIGH THROUGH FEMUR ANY LEVEL Left 11/23/2022    Procedure: (RAMEZ);  Surgeon: Silvano Thompson DO;  Location: [facility];  Service: Vascular    NY NEGATIVE PRESSURE WOUND THERAPY DME <= 50 SQ CM Bilateral  12/19/2022    Procedure: Right above knee amputation washout; vac change; Left above knee amputation debridement; vac placement;  Surgeon: Johnathan Britton DO;  [facility];  Service: Vascular    WOUND DEBRIDEMENT Right 12/16/2022    Procedure: AKA STUMP WASHOUT, Left AKA Staple removal. application of wound vac to Right AKA;  Surgeon: Wan Silva MD;  Location: [facility];  Service: Vascular     Family History   Problem Relation Age of Onset    Diabetes Mother     Lung cancer Mother     Cancer Father     Lung cancer Father     Hypertension Brother     Hypertension Family      Current Outpatient Medications on File Prior to Visit   Medication Sig Dispense Refill    acetaminophen (TYLENOL) 325 mg tablet Take 650 mg by mouth every 4 (four) hours as needed for mild pain      acetaminophen (TYLENOL) 650 mg suppository Insert 650 mg into the rectum every 4 (four) hours as needed for mild pain      amiodarone 100 mg tablet Take 100 mg by mouth daily      amLODIPine (NORVASC) 10 mg tablet TAKE 1 TABLET BY MOUTH EVERY DAY 90 tablet 1    apixaban (ELIQUIS) 5 mg Take 5 mg by mouth 2 (two) times a day      aspirin 81 mg chewable tablet Chew 81 mg daily      atorvastatin (LIPITOR) 40 mg tablet TAKE 1 TABLET BY MOUTH EVERY DAY 90 tablet 1    buPROPion (WELLBUTRIN XL) 150 mg 24 hr tablet Take 1 tablet (150 mg total) by mouth daily 30 tablet 0    calcium carbonate (TUMS) 500 mg chewable tablet Chew 2 tablets every 6 (six) hours as needed for indigestion or heartburn      Continuous Blood Gluc  (FreeStyle Noé 14 Day [place]) LITO Use 1 Units continuous 1 each 0    Continuous Blood Gluc Sensor (FreeStyle Noé 14 Day Sensor) MISC Use daily as directed for CGM - Change every 14 days 2 each 1    dulaglutide (Trulicity) 4.5 MG/0.5ML injection Inject 0.5 mL (4.5 mg total) under the skin every 7 days - Start after completing 4 weeks of dulaglutide 3 mg weekly for 4 weeks. If tolerating, start dulaglutide 4.5  mg weekly. 2 mL 4    ferrous sulfate 325 (65 Fe) mg tablet Take 325 mg by mouth daily with breakfast      gabapentin (NEURONTIN) 100 mg capsule Take 100 mg by mouth daily at bedtime      hydrALAZINE (APRESOLINE) 100 MG tablet Take 1 tablet (100 mg total) by mouth every 8 (eight) hours  0    hydrochlorothiazide (HYDRODIURIL) 12.5 mg tablet Take 1 tablet (12.5 mg total) by mouth daily Do not start before February 4, 2023. (Patient taking differently: Take 25 mg by mouth daily)  0    insulin glargine (LANTUS) 100 units/mL subcutaneous injection Inject 42 Units under the skin every morning 10 mL 4    Insulin Pen Needle (B-D UF III MINI PEN NEEDLES) 31G X 5 MM MISC USE WITH INSULIN FOUR TIMES DAILY 200 each 3    Lancets 28G MISC Use 1 each 4 (four) times a day 400 each 0    levothyroxine 50 mcg tablet Take 50 mcg by mouth daily      lisinopril (ZESTRIL) 40 mg tablet TAKE 1 TABLET BY MOUTH EVERY DAY 90 tablet 1    metoprolol tartrate (LOPRESSOR) 50 mg tablet Take 1 tablet (50 mg total) by mouth every 12 (twelve) hours  0    Multiple Vitamin (multivitamin) tablet Take 1 tablet by mouth daily      pantoprazole (PROTONIX) 20 mg tablet Take 20 mg by mouth daily      polyethylene glycol (MIRALAX) 17 g packet Take 17 g by mouth daily as needed for constipation      Psyllium 25 % PACK Take 1 packet by mouth daily      saccharomyces boulardii (FLORASTOR) 250 mg capsule Take 250 mg by mouth 2 (two) times a day      sertraline (ZOLOFT) 100 mg tablet TAKE 1 TABLET BY MOUTH EVERY DAY 90 tablet 1    thiamine (VITAMIN B1) 100 mg tablet Take 1 tablet (100 mg total) by mouth daily Do not start before February 4, 2023.  0    travoprost (TRAVATAN-Z) 0.004 % ophthalmic solution Administer 1 drop into the left eye daily at bedtime       No current facility-administered medications on file prior to visit.     Allergies   Allergen Reactions    Heparin Other (See Comments)     History of HIT      Current Outpatient Medications on File Prior to  Visit   Medication Sig Dispense Refill    acetaminophen (TYLENOL) 325 mg tablet Take 650 mg by mouth every 4 (four) hours as needed for mild pain      acetaminophen (TYLENOL) 650 mg suppository Insert 650 mg into the rectum every 4 (four) hours as needed for mild pain      amiodarone 100 mg tablet Take 100 mg by mouth daily      amLODIPine (NORVASC) 10 mg tablet TAKE 1 TABLET BY MOUTH EVERY DAY 90 tablet 1    apixaban (ELIQUIS) 5 mg Take 5 mg by mouth 2 (two) times a day      aspirin 81 mg chewable tablet Chew 81 mg daily      atorvastatin (LIPITOR) 40 mg tablet TAKE 1 TABLET BY MOUTH EVERY DAY 90 tablet 1    buPROPion (WELLBUTRIN XL) 150 mg 24 hr tablet Take 1 tablet (150 mg total) by mouth daily 30 tablet 0    calcium carbonate (TUMS) 500 mg chewable tablet Chew 2 tablets every 6 (six) hours as needed for indigestion or heartburn      Continuous Blood Gluc  (FreeStyle Noé 14 Day [place]) LITO Use 1 Units continuous 1 each 0    Continuous Blood Gluc Sensor ("[employer]"Style Noé 14 Day Sensor) MISC Use daily as directed for CGM - Change every 14 days 2 each 1    dulaglutide (Trulicity) 4.5 MG/0.5ML injection Inject 0.5 mL (4.5 mg total) under the skin every 7 days - Start after completing 4 weeks of dulaglutide 3 mg weekly for 4 weeks. If tolerating, start dulaglutide 4.5 mg weekly. 2 mL 4    ferrous sulfate 325 (65 Fe) mg tablet Take 325 mg by mouth daily with breakfast      gabapentin (NEURONTIN) 100 mg capsule Take 100 mg by mouth daily at bedtime      hydrALAZINE (APRESOLINE) 100 MG tablet Take 1 tablet (100 mg total) by mouth every 8 (eight) hours  0    hydrochlorothiazide (HYDRODIURIL) 12.5 mg tablet Take 1 tablet (12.5 mg total) by mouth daily Do not start before February 4, 2023. (Patient taking differently: Take 25 mg by mouth daily)  0    insulin glargine (LANTUS) 100 units/mL subcutaneous injection Inject 42 Units under the skin every morning 10 mL 4    Insulin Pen Needle (B-D UF III MINI PEN NEEDLES)  31G X 5 MM MISC USE WITH INSULIN FOUR TIMES DAILY 200 each 3    Lancets 28G MISC Use 1 each 4 (four) times a day 400 each 0    levothyroxine 50 mcg tablet Take 50 mcg by mouth daily      lisinopril (ZESTRIL) 40 mg tablet TAKE 1 TABLET BY MOUTH EVERY DAY 90 tablet 1    metoprolol tartrate (LOPRESSOR) 50 mg tablet Take 1 tablet (50 mg total) by mouth every 12 (twelve) hours  0    Multiple Vitamin (multivitamin) tablet Take 1 tablet by mouth daily      pantoprazole (PROTONIX) 20 mg tablet Take 20 mg by mouth daily      polyethylene glycol (MIRALAX) 17 g packet Take 17 g by mouth daily as needed for constipation      Psyllium 25 % PACK Take 1 packet by mouth daily      saccharomyces boulardii (FLORASTOR) 250 mg capsule Take 250 mg by mouth 2 (two) times a day      sertraline (ZOLOFT) 100 mg tablet TAKE 1 TABLET BY MOUTH EVERY DAY 90 tablet 1    thiamine (VITAMIN B1) 100 mg tablet Take 1 tablet (100 mg total) by mouth daily Do not start before 2023.  0    travoprost (TRAVATAN-Z) 0.004 % ophthalmic solution Administer 1 drop into the left eye daily at bedtime       No current facility-administered medications on file prior to visit.      Social History     Tobacco Use    Smoking status: Former     Current packs/day: 0.00     Types: Cigarettes     Quit date:      Years since quittin.    Smokeless tobacco: Never   Vaping Use    Vaping status: Never Used   Substance and Sexual Activity    Alcohol use: Never     Comment: OCCASIONAL ALCOHOL USE IN ALL SCRIPTS    Drug use: No    Sexual activity: Not on file         Objective   Vital Signs    /68   Pulse 77   Resp 18     Physical Exam  HENT:      Nose: Nose normal.   Cardiovascular:      Rate and Rhythm: Normal rate and regular rhythm.      Pulses: Normal pulses.      Heart sounds: Normal heart sounds.   Pulmonary:      Effort: Pulmonary effort is normal.      Breath sounds: Normal breath sounds.   Abdominal:      General: Bowel sounds are normal.       Palpations: Abdomen is soft.   Musculoskeletal:      Comments: B/l AKA    Skin:     General: Skin is warm and dry.      Capillary Refill: Capillary refill takes less than 2 seconds.   Neurological:      Mental Status: She is alert. Mental status is at baseline.   Psychiatric:         Mood and Affect: Mood normal.         Ela Allred 11/12/2024@[id number]

## 2024-11-12 NOTE — ASSESSMENT & PLAN NOTE
Current blood pressure is stable at 122/68. Patient is asymptomatic. Current blood pressure range is systolic 122-165 and diastolic 60-80 range.  -Continue current blood pressure medication for  hypertension management    Lisinopril 40 mg po daily    Amlodipine 10 mg po daily   Hydrochlorothiazide 25 mg po daily  Hydralazine 100 mg po every 8 hours.  [id number] mg po daily.  -Monitor blood pressure as scheduled.  -Avoid hypotension  -Follow up with cardiology as scheduled.

## 2024-11-12 NOTE — ASSESSMENT & PLAN NOTE
Continue current dose of Levothyroxine  50 mcg po daily.  - Monitor TSH as scheduled and adjust dose as needed.

## 2024-11-16 PROBLEM — R05.1 ACUTE COUGH: Status: RESOLVED | Noted: 2024-10-17 | Resolved: 2024-11-16

## 2024-12-11 ENCOUNTER — NURSING HOME VISIT (OUTPATIENT)
Dept: GERIATRICS | Facility: OTHER | Age: 64
End: 2024-12-11
Payer: COMMERCIAL

## 2024-12-11 DIAGNOSIS — M79.645 PAIN IN FINGER OF LEFT HAND: Primary | ICD-10-CM

## 2024-12-11 DIAGNOSIS — D72.823 LEUKEMOID REACTION: ICD-10-CM

## 2024-12-11 PROCEDURE — 99308 SBSQ NF CARE LOW MDM 20: CPT | Performed by: STUDENT IN AN ORGANIZED HEALTH CARE EDUCATION/TRAINING PROGRAM

## 2024-12-12 ENCOUNTER — NURSING HOME VISIT (OUTPATIENT)
Dept: GERIATRICS | Facility: OTHER | Age: 64
End: 2024-12-12
Payer: COMMERCIAL

## 2024-12-12 DIAGNOSIS — E11.65 TYPE 2 DIABETES MELLITUS WITH HYPERGLYCEMIA, WITH LONG-TERM CURRENT USE OF INSULIN (HCC): ICD-10-CM

## 2024-12-12 DIAGNOSIS — E21.3 HYPERPARATHYROIDISM (HCC): ICD-10-CM

## 2024-12-12 DIAGNOSIS — R94.31 PROLONGED Q-T INTERVAL ON ECG: ICD-10-CM

## 2024-12-12 DIAGNOSIS — E78.49 FAMILIAL COMBINED HYPERLIPIDEMIA: ICD-10-CM

## 2024-12-12 DIAGNOSIS — I73.9 PAD (PERIPHERAL ARTERY DISEASE) (HCC): Primary | Chronic | ICD-10-CM

## 2024-12-12 DIAGNOSIS — D72.823 LEUKEMOID REACTION: ICD-10-CM

## 2024-12-12 DIAGNOSIS — D50.8 IRON DEFICIENCY ANEMIA SECONDARY TO INADEQUATE DIETARY IRON INTAKE: ICD-10-CM

## 2024-12-12 DIAGNOSIS — G54.6 PHANTOM PAIN AFTER AMPUTATION OF LOWER EXTREMITY (HCC): ICD-10-CM

## 2024-12-12 DIAGNOSIS — Z79.4 TYPE 2 DIABETES MELLITUS WITH HYPERGLYCEMIA, WITH LONG-TERM CURRENT USE OF INSULIN (HCC): ICD-10-CM

## 2024-12-12 DIAGNOSIS — M79.645 PAIN IN FINGER OF LEFT HAND: ICD-10-CM

## 2024-12-12 DIAGNOSIS — K21.9 GASTROESOPHAGEAL REFLUX DISEASE, UNSPECIFIED WHETHER ESOPHAGITIS PRESENT: ICD-10-CM

## 2024-12-12 DIAGNOSIS — Z86.74 HISTORY OF CARDIAC ARREST: ICD-10-CM

## 2024-12-12 DIAGNOSIS — F41.9 ANXIETY: ICD-10-CM

## 2024-12-12 DIAGNOSIS — E03.9 ACQUIRED HYPOTHYROIDISM: ICD-10-CM

## 2024-12-12 DIAGNOSIS — F33.9 DEPRESSION, RECURRENT (HCC): ICD-10-CM

## 2024-12-12 DIAGNOSIS — I10 PRIMARY HYPERTENSION: ICD-10-CM

## 2024-12-12 DIAGNOSIS — Z86.73 HISTORY OF CVA (CEREBROVASCULAR ACCIDENT): ICD-10-CM

## 2024-12-12 PROCEDURE — 99309 SBSQ NF CARE MODERATE MDM 30: CPT | Performed by: STUDENT IN AN ORGANIZED HEALTH CARE EDUCATION/TRAINING PROGRAM

## 2024-12-12 NOTE — ASSESSMENT & PLAN NOTE
Mood stable  Supportive care  Psych following at facility  Has alprazolam 0.25mg daily PRN in addition to rest of regimen  See plan under Depression

## 2024-12-12 NOTE — ASSESSMENT & PLAN NOTE
Seems to be primary on chart review  Continue multivitamin/vit D  Monitor calcium on routine labs - has been stable/WNL recently  Following Endocrine outpatient

## 2024-12-12 NOTE — ASSESSMENT & PLAN NOTE
Last TSH WNL from April 2024  Continue levothyroxine 50mcg  No apparent acute/associated symptoms   Monitor TSH periodically; Adjust dosing as appropriate  Patient is following Endocrine outpatient

## 2024-12-12 NOTE — ASSESSMENT & PLAN NOTE
mood stable and well controlled overall. Patient not ready for GDR at this time as she reports her mood always gets a bit more down around the holidays.  Psych following  Supportive care  Continue regimen including  Continue buproprion 150 mg po daily  Continue sertraline 100 mg po daily at bedtime  (Was on quetiapine, has been weaned and discontinued as of April 2024 and appears tolerating well without it)

## 2024-12-12 NOTE — ASSESSMENT & PLAN NOTE
Lab Results   Component Value Date    HGBA1C 9.8 (H) 10/25/2022       Not yet well controlled  Encourage CCD  Monitor glucose routinely - recent fasting sugars have generally been mid-high 100s, improved from prior, stable lately  Avoid hypoglycemia  Encourage lifestyle modifications including healthy diet, weight management, exercise as appropriate  Following Endocrine outpatient  Continue Lantus, will titrate from 42u to 45u daily, titrate further as appropriate  Trulicity recently increased to 4.5mg subQ weekly  Follow up HgbA1c Q3 months July/Oct/Jan/April

## 2024-12-12 NOTE — PROGRESS NOTES
[facility]  Facility: [facility]    PROGRESS NOTE  Nursing Home Place of Service: nursing home place of service: POS 32 Unskilled- No Part A Coverage    NAME: Dianna Varghese  : 1960 AGE: 64 y.o. SEX: female MRN: [id number]  DATE OF ENCOUNTER: 2024    Assessment and Plan     Problem List Items Addressed This Visit       Esophageal reflux    -stable per patient  -continue PRN calcium carbonate and PPI (pantoprazole 20mg daily)  -recommend OOB with meals, sit upright for at least 30 minutes afterwards, avoid trigger foods  -continue to monitor  -follow up with GI as appropriate/needed         Depression, recurrent (HCC)    mood stable and well controlled overall. Patient not ready for GDR at this time as she reports her mood always gets a bit more down around the holidays.  Psych following  Supportive care  Continue regimen including  Continue buproprion 150 mg po daily  Continue sertraline 100 mg po daily at bedtime  (Was on quetiapine, has been weaned and discontinued as of 2024 and appears tolerating well without it)         Anxiety    Mood stable  Supportive care  Psych following at facility  Has alprazolam 0.25mg daily PRN in addition to rest of regimen  See plan under Depression           Hypertension    Monitor BP - recently generally stable around 110s-140s systolic, rarely higher  Pulse generally 60s-80s  Avoid hypotension  No acute cardiac complaints  Continue regimen including amlodipine 10mg daily, lisinopril 40mg daily, metoprolol tartrate 50mg BID, HCTZ 25mg daily, hydralazine 100mg TID, amiodarone 100mg daily  Consider weaning/adjusting extensive regimen as appropriate, especially if BP remains well controlled or becomes softer. For now seems stable on current regimen  Following Cardiology outpatient         Familial combined hyperlipidemia    Continue statin          Type 2 diabetes mellitus with hyperglycemia, with long-term current use of insulin (HCC)         Lab Results   Component Value Date    HGBA1C 9.8 (H) 10/25/2022       Not yet well controlled  Encourage CCD  Monitor glucose routinely - recent fasting sugars have generally been mid-high 100s, improved from prior, stable lately  Avoid hypoglycemia  Encourage lifestyle modifications including healthy diet, weight management, exercise as appropriate  Following Endocrine outpatient  Continue Lantus, will titrate from 42u to 45u daily, titrate further as appropriate  Trulicity recently increased to 4.5mg subQ weekly  Follow up HgbA1c Q3 months July/Oct/Jan/April         Hyperparathyroidism (HCC)    Seems to be primary on chart review  Continue multivitamin/vit D  Monitor calcium on routine labs - has been stable/WNL recently  Following Endocrine outpatient         PAD (peripheral artery disease) (HCC) - Primary (Chronic)    S/p left AKA on 11/23/22  S/p right AKA stump revision on 01/27/23  Continue aspirin, statin, Eliquis  Working with rehab regarding prosthetics, PT/OT as appropriate  Follow up with Vascular as appropriate         Anemia    Seems fairly mild, stable on lab review  -monitor on routine labs  -borderline microcytic and hx of low iron on lab review - continue ferrous sulfate PO qOD  -likely component of CKD as well  -monitor for acute bleed - no present signs  -consider further workup, Heme consult if persistent/worsening  -transfuse PRN Hb <7  -low threshold to hold anticoagulation if evidence of bleed or worsening Hb         History of CVA (cerebrovascular accident)    Pt had right CVA 11/14/2022  Continue aspirin, statin  Pt has been off plavix since 03/2023 after vascular visit         Prolonged Q-T interval on ECG    Noted on prior EKGs  No apparent acute/associated symptoms  Avoid QT prolonging medications as able  Following with Cardiology         Acquired hypothyroidism    Last TSH WNL from April 2024  Continue levothyroxine 50mcg  No apparent acute/associated symptoms   Monitor TSH  "periodically; Adjust dosing as appropriate  Patient is following Endocrine outpatient         History of cardiac arrest    S/p cardiac arrest on 1/18/2023 considered due to electrolyte abnormalities/severe sepsis  No recent/acute cardiac complaints  Continue cardiac regimen including aspirin, Eliquis, amiodarone, atorvastatin, beta blocker  Follow up with Cardiology as appropriate/yearly; had recent Cardio visit 4/8/24 and considered stable         Leukemoid reaction    Patient with persistent mildly elevated WBC since around June 2024  Seems to have coincided with likely viral URI and associated diarrhea around that time however has not improved  Monitor on routine labs - seems fairly mild but persistent as of Dec 2024  Monitor for acute infectious symptoms - no recent symptoms. Patient denies any unintentional weight loss, fevers, night sweats, chills, or other overt signs of systemic or malignant pathology.  Have ordered Hematology consult, patient presently scheduled for Jan 2025         Pain in finger of left hand    Patient notes since approx 3 days now she has had a small blister/region of local swelling and tenderness adjacent to the nail of her left ring finger.  She denied known associated trauma recently. She noted she has had similar blisters in the past typically associated with cutting or filing her nails too deeply and endorses having filed this nail recently.  She feels the blitering is stable perhaps slightly improved since yesterday  No pain at rest. Mild tenderness of the region around the distal left ring finger on palpation which seems improved from yesterday's exam. Retains good ROM. No active bleed/drainage appreciated. Upper portion of blister appears pus-filled.  She otherwise feels well with no systemic infectious symptoms.  L hand Xray 12/11/24 was reassuring with no noted fracture or bone infection \"The metacarpals and phalanges are normal in contour, the articular margins are normal in " "alignment. There is mild subarticular sclerosis with joint space narrowing, there are no erosions. There is no significant soft tissue swelling of the left ring finger, there is no evidence to suggest osteomyelitis or foreign body.\"  Discussion and further shared decision-making with patient. This appears to be a relatively small blister/superficial soft tissue infection likely related to recent filing of the nail. Education has been provided regarding safe nail maintenance. Continue to defer antibiotics at this time as the issue seems stable/improving, electing to observe closely. If issue appears to worsen in near future could consider course of oral antibiotics for soft tissue infection.  Continue local care. Consider wound care consult if region opens.         Phantom pain after amputation of lower extremity (HCC)    Phantom limb pains L>R lower extremity, intermittent, at random times but generally worse overnight  Was recently started on gabapentin 100mg qHS which seems helpful but not enough  Will titrate to gabapentin 200mg BID at present  Continue to monitor                        Chief Complaint     Follow up 60 day    History of Present Illness     Dianna Varghese is a 64 y.o. female who was seen today for follow up. PMH including bilateral above knee amputations, cardiac arrest, type 2 DM, TIA, major depressive disorder, peripheral vascular disease, hypertension, hyperlipidemia, and GERD       Patient seen and examined in room  Others present: none  Patient sitting up in bed  Appears comfortable, awake, alert, oriented to situation, able to converse appropriately  Polite, Aox3, in very good spirits, appears to be a reasonable historian, mentation appearing similar to my prior visit. We discussed her left ring finger, see Plan. Also we discussed her persistently elevated WBC in recent months, see Plan. She otherwise feels well. No SOB, she feels she is breathing fine on room air, no orthopnea. Reports " "good stable appetite, no swallowing trouble, no abd pain/N/V, recently BM regular/normal. Urinating well without acute issue. She generally requires andree lift for transfer and utilizes wheelchair, able to self-propel. No CP/palpitations including on exertion; does have some chronic stable dyspnea on exertion which is unchanged. Denies orthostatic lightheadedness. Sleeping well. No acute pain anywhere presently however we discussed she has been having intermittent phantom limb pains L>R, generally worse overnight, for which gabapentin seems helpful. No acute cardiopulmonary, abdominal, or urinary symptoms; see ROS for more details.    No further questions or acute concerns identified.  No further acute concerns per nursing.     Lab Review:   1/18/24: CBC with Hb 10.8; CMP with Cr 1.43, eGFR 41 (recent baseline seems around 40s-60s); TSH 8.20, FT4 0.6; A1c 7.7  3/6/24: CBC with Hb 10.8; BMP generally stable, Cr 1.41, eGFR 42; TSH 7.96  4/18/24: CBC with Hb 10.9; CMP with Cr 1.51, eGFR 39; A1c 9.3; TSH WNL  6/3/24: CBC with WBC 11.8, Hb 10.5  6/10/24: CBC with WBC 12.3, Hb 10.5  6/17/24: CBC with WBC 12.1, Hb 10.5  6/26/24: CBC with WBC 10.7, Hb 10.6; BMP generally stable/non-actionable, Cr 1.08, GFR 58; Mg 1.7  7/17/24: CBC with WBC 11.8, Hb 11.0; CMP generally stable/non-actionable, Cr 1.43, eGFR 41; A1c 9.7  8/14/24: BMP with Cr 1.21, GFR 50  8/28/24: CBC with WBC 11.9, Hb 11.3  10/16/24: CBC with WBC 11.2, Hb 11.6; CMP generally stable/non-actionable, Cr 0.99, eGFR 64; A1c 9.4  10/21/24: CBC with WBC 12.0, Hb 11.2, manual diff with elevated neutrophils  12/12/24: CBC with WBC 11.8, Hb 10.9          EKG Jan 2023: NSR, 91bpm, Qtc 511  LEON Jan 2023: EF 60, \"No vegetations or other evidence of endocarditis seen \"       The following portions of the patient's history were reviewed and updated as appropriate: allergies, current medications, past family history, past medical history, past social history, past surgical " history and problem list.    Review of Systems     Review of Systems   Constitutional:  Negative for appetite change, chills, diaphoresis and fever.   HENT:  Negative for congestion, drooling, ear pain, hearing loss, rhinorrhea, sore throat and trouble swallowing.    Eyes:  Negative for pain, discharge, redness, itching and visual disturbance.   Respiratory:  Negative for cough, chest tightness, shortness of breath and wheezing.    Cardiovascular:  Negative for chest pain, palpitations and leg swelling.   Gastrointestinal:  Negative for abdominal pain, blood in stool, constipation, diarrhea, nausea and vomiting.   Genitourinary:  Negative for difficulty urinating, dysuria and hematuria.   Musculoskeletal:  Positive for gait problem. Negative for arthralgias, back pain and neck pain.   Skin:  Negative for color change.   Neurological:  Negative for dizziness, facial asymmetry, speech difficulty, weakness, light-headedness and headaches.   Psychiatric/Behavioral:  Negative for agitation, behavioral problems and confusion. The patient is not nervous/anxious and is not hyperactive.    All other systems reviewed and are negative.      Active Problem List     Patient Active Problem List   Diagnosis    Esophageal reflux    DM (diabetes mellitus) type II, controlled, with peripheral vascular disorder ([facility])    Class 3 severe obesity due to excess calories with serious comorbidity and body mass index (BMI) of 40.0 to 44.9 in adult ([facility])    Depression, recurrent ([facility])    Anxiety    Hypertension    Diabetic peripheral neuropathy ([facility])    Vitamin D deficiency    Systolic murmur    Familial combined hyperlipidemia    Arthritis    Transaminitis    Moderate protein-calorie malnutrition ([facility])    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 ([facility])    Type 2 diabetes mellitus with hyperglycemia, with long-term current use of insulin ([facility])     Hyperparathyroidism (HCC)    Wound of lower extremity    Non healing left heel wound    Hypokalemia    PAD (peripheral artery disease) (HCC)    Generalized edema due to fluid overload    Anemia    Diabetic ulcer of heel associated with diabetes mellitus due to underlying condition (HCC)    HIT (heparin-induced thrombocytopenia) (HCC)    Diarrhea, unspecified    Mild protein-calorie malnutrition (HCC)    History of CVA (cerebrovascular accident)    Fall    Troponin level elevated    Hypomagnesemia    Prolonged Q-T interval on ECG    Thrombocytosis    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)    Weight gain    S/P bilateral above knee amputation (HCC)    Acquired hypothyroidism    History of cardiac arrest    Advance care planning    Chest congestion    Leukemoid reaction    Pain in finger of left hand    Phantom pain after amputation of lower extremity (HCC)       Objective     Vital Signs:     BP: 128/75 mmHg  12/11/2024 16:30   Temp:97.3 °F  11/30/2024 23:54 Pulse:75 bpm  12/11/2024 09:38 Weight:199.5 Lbs  12/1/2024 10:28   Resp:20 Breaths/min  11/30/2024 23:54 BS:206 mg/dL  12/12/2024 11:10 O2:94 %  11/29/2024 00:43 Pain:0  12/12/2024 08:30       Physical Exam  Vitals reviewed.   Constitutional:       General: She is not in acute distress.     Appearance: She is obese. She is not toxic-appearing or diaphoretic.   HENT:      Head: Normocephalic and atraumatic.      Right Ear: External ear normal.      Left Ear: External ear normal.      Nose: Nose normal. No rhinorrhea.      Mouth/Throat:      Mouth: Mucous membranes are dry.      Pharynx: Oropharynx is clear. No posterior oropharyngeal erythema.   Eyes:      General: No scleral icterus.        Right eye: No discharge.         Left eye: No discharge.      Extraocular Movements: Extraocular movements intact.      Conjunctiva/sclera: Conjunctivae normal.      Pupils: Pupils are equal, round, and reactive to light.   Cardiovascular:      Rate and Rhythm:  Normal rate and regular rhythm.   Pulmonary:      Effort: Pulmonary effort is normal. No respiratory distress.      Breath sounds: Normal breath sounds. No wheezing or rales.   Abdominal:      General: Bowel sounds are normal.      Palpations: Abdomen is soft.      Tenderness: There is no abdominal tenderness. There is no guarding.   Musculoskeletal:      Cervical back: No rigidity.      Comments: Bilateral AKA, sites appear well healed without bleed, drainage, or erythema  Left ring finger at left aspect of nail with small region of swelling with overlying pus-filled blistering, no opening/active bleed/drainage. Distal finger mildly tender to palpation but less so than yesterday   Skin:     General: Skin is warm and dry.      Coloration: Skin is not jaundiced.   Neurological:      General: No focal deficit present.      Mental Status: She is alert and oriented to person, place, and time. Mental status is at baseline.   Psychiatric:         Mood and Affect: Mood normal.         Behavior: Behavior normal.         Thought Content: Thought content normal.         Judgment: Judgment normal.         Pertinent Laboratory/Diagnostic Studies:  Laboratory and Imaging studies reviewed. Full report in the paper chart.     Current Medications   Medications reviewed and updated in facility chart.      -Total time spent on this encounter today including documentation and workup review, face to face time, history and exam, and documentation/orders was approximately 40 minutes.  -This note will be copied to [facility] EMR where vitals and medication orders are placed.    Seb Chun D.O.  Geriatric Medicine  12/12/2024 2:34 PM

## 2024-12-12 NOTE — ASSESSMENT & PLAN NOTE
Patient with persistent mildly elevated WBC since around June 2024  Seems to have coincided with likely viral URI and associated diarrhea around that time however has not improved  Monitor on routine labs - seems fairly mild but persistent as of Dec 2024  Monitor for acute infectious symptoms - no recent symptoms. Patient denies any unintentional weight loss, fevers, night sweats, chills, or other overt signs of systemic or malignant pathology.  Have ordered Hematology consult, patient presently scheduled for Jan 2025

## 2024-12-12 NOTE — ASSESSMENT & PLAN NOTE
"Patient notes since approx 3 days now she has had a small blister/region of local swelling and tenderness adjacent to the nail of her left ring finger.  She denied known associated trauma recently. She noted she has had similar blisters in the past typically associated with cutting or filing her nails too deeply and endorses having filed this nail recently.  She feels the blitering is stable perhaps slightly improved since yesterday  No pain at rest. Mild tenderness of the region around the distal left ring finger on palpation which seems improved from yesterday's exam. Retains good ROM. No active bleed/drainage appreciated. Upper portion of blister appears pus-filled.  She otherwise feels well with no systemic infectious symptoms.  L hand Xray 12/11/24 was reassuring with no noted fracture or bone infection \"The metacarpals and phalanges are normal in contour, the articular margins are normal in alignment. There is mild subarticular sclerosis with joint space narrowing, there are no erosions. There is no significant soft tissue swelling of the left ring finger, there is no evidence to suggest osteomyelitis or foreign body.\"  Discussion and further shared decision-making with patient. This appears to be a relatively small blister/superficial soft tissue infection likely related to recent filing of the nail. Education has been provided regarding safe nail maintenance. Continue to defer antibiotics at this time as the issue seems stable/improving, electing to observe closely. If issue appears to worsen in near future could consider course of oral antibiotics for soft tissue infection.  Continue local care. Consider wound care consult if region opens.  "

## 2024-12-12 NOTE — ASSESSMENT & PLAN NOTE
Phantom limb pains L>R lower extremity, intermittent, at random times but generally worse overnight  Was recently started on gabapentin 100mg qHS which seems helpful but not enough  Will titrate to gabapentin 200mg BID at present  Continue to monitor

## 2024-12-12 NOTE — ASSESSMENT & PLAN NOTE
"Patient notes since approx 2 days she has had a small blister/region of local swelling and tenderness adjacent to the nail of her left ring finger.  She denies known associated trauma recently. She notes she has had similar blisters in the past typically associated with cutting or filing her nails too deeply and endorses having filed this nail recently.  She feels the blitering is bigger compared to 2 days ago but stable compare to yesterday  No pain at rest. Mild-moderate tenderness of the region around the distal left ring finger on palpation. Retains good ROM. No active bleed/drainage appreciated. Upper portion of blister appears pus-filled.  She otherwise feels well with no systemic infectious symptoms.  Extensive discussion with patient and separately with nursing today.  Shared decision-making with patient. This appears to be a relatively small blister/superficial soft tissue infection likely related to recent filing of the nail. Education provided regarding safe nail maintenance. Patient at this time does not want any topical pain control for the issue. We decided to defer antibiotics for the moment, electing to observe closely. If issue appears to worsen in the next day or two could consider course of oral antibiotics for soft tissue infection. Also decided to obtain Xray of left hand to rule out any occult injury/fracture or (less likely) deep infection/osteomyelitis.  Asked nurse to apply topical betadyne for sterility to region for now  Continue local care. Consider wound care consult if region opens.    XR L hand 12/11/24 \"The metacarpals and phalanges are normal in contour, the articular margins are normal in alignment. There is mild subarticular sclerosis with joint space narrowing, there are no erosions. There is no significant soft tissue swelling of the left ring finger, there is no evidence to suggest osteomyelitis or foreign body.\"  "

## 2024-12-12 NOTE — ASSESSMENT & PLAN NOTE
-stable per patient  -continue PRN calcium carbonate and PPI (pantoprazole 20mg daily)  -recommend OOB with meals, sit upright for at least 30 minutes afterwards, avoid trigger foods  -continue to monitor  -follow up with GI as appropriate/needed

## 2024-12-12 NOTE — PROGRESS NOTES
[facility]  Facility: [facility]    PROGRESS NOTE  Nursing Home Place of Service: nursing home place of service: POS 32 Unskilled- No Part A Coverage    NAME: Dianna Varghese  : 1960 AGE: 64 y.o. SEX: female MRN: [id number]  DATE OF ENCOUNTER: 2024    Assessment and Plan     Problem List Items Addressed This Visit       Leukemoid reaction    Patient with persistent mildly elevated WBC since around 2024  Seems to have coincided with likely viral URI and associated diarrhea around that time however has not improved  Monitor on routine labs - seems fairly mild but persistent as of 2024 - have ordered recheck CBC for 24  Monitor for acute infectious symptoms - no recent symptoms. Patient denies any unintentional weight loss, fevers, night sweats, chills, or other overt signs of systemic or malignant pathology.  Have ordered Hematology consult, patient presently scheduled for 2025         Pain in finger of left hand - Primary    Patient notes since approx 2 days she has had a small blister/region of local swelling and tenderness adjacent to the nail of her left ring finger.  She denies known associated trauma recently. She notes she has had similar blisters in the past typically associated with cutting or filing her nails too deeply and endorses having filed this nail recently.  She feels the blitering is bigger compared to 2 days ago but stable compare to yesterday  No pain at rest. Mild-moderate tenderness of the region around the distal left ring finger on palpation. Retains good ROM. No active bleed/drainage appreciated. Upper portion of blister appears pus-filled.  She otherwise feels well with no systemic infectious symptoms.  Extensive discussion with patient and separately with nursing today.  Shared decision-making with patient. This appears to be a relatively small blister/superficial soft tissue infection likely related to recent filing of the nail.  "Education provided regarding safe nail maintenance. Patient at this time does not want any topical pain control for the issue. We decided to defer antibiotics for the moment, electing to observe closely. If issue appears to worsen in the next day or two could consider course of oral antibiotics for soft tissue infection. Also decided to obtain Xray of left hand to rule out any occult injury/fracture or (less likely) deep infection/osteomyelitis.  Asked nurse to apply topical betadyne for sterility to region for now  Continue local care. Consider wound care consult if region opens.    XR L hand 12/11/24 \"The metacarpals and phalanges are normal in contour, the articular margins are normal in alignment. There is mild subarticular sclerosis with joint space narrowing, there are no erosions. There is no significant soft tissue swelling of the left ring finger, there is no evidence to suggest osteomyelitis or foreign body.\"                      Chief Complaint     Follow up acute regarding left ring finger blister/injury?    History of Present Illness     Dianna Varghese is a 64 y.o. female who was seen today for follow up. PMH including bilateral above knee amputations, cardiac arrest, type 2 DM, TIA, major depressive disorder, peripheral vascular disease, hypertension, hyperlipidemia, and GERD     Asked by nurse to see patient for acute visit for recent finding of left ring finger blister/injury.    Patient seen and examined in room  Others present: none  Patient sitting up in bed  Appears comfortable, awake, alert, oriented to situation, able to converse appropriately  Polite, Aox3, in very good spirits, appears to be a reasonable historian, mentation appearing similar to my prior visit. We discussed her left ring finger, see Plan. Also we discussed her persistently elevated WBC in recent months, see Plan. She otherwise feels well with no acute concerns at this time, ROS briefly reviewed and otherwise stable as " "below. Advised will follow up closely regarding the left finger.   No further questions or acute concerns identified.  No further acute concerns per nursing.    Of note patient is scheduled for her routine 60-day follow-up visit tomorrow, will revisit for thorough visit and recheck this acute issue at that time.       Lab Review:   1/18/24: CBC with Hb 10.8; CMP with Cr 1.43, eGFR 41 (recent baseline seems around 40s-60s); TSH 8.20, FT4 0.6; A1c 7.7  3/6/24: CBC with Hb 10.8; BMP generally stable, Cr 1.41, eGFR 42; TSH 7.96  4/18/24: CBC with Hb 10.9; CMP with Cr 1.51, eGFR 39; A1c 9.3; TSH WNL  6/3/24: CBC with WBC 11.8, Hb 10.5  6/10/24: CBC with WBC 12.3, Hb 10.5  6/17/24: CBC with WBC 12.1, Hb 10.5  6/26/24: CBC with WBC 10.7, Hb 10.6; BMP generally stable/non-actionable, Cr 1.08, GFR 58; Mg 1.7  7/17/24: CBC with WBC 11.8, Hb 11.0; CMP generally stable/non-actionable, Cr 1.43, eGFR 41; A1c 9.7  8/14/24: BMP with Cr 1.21, GFR 50  8/28/24: CBC with WBC 11.9, Hb 11.3  10/16/24: CBC with WBC 11.2, Hb 11.6; CMP generally stable/non-actionable, Cr 0.99, eGFR 64; A1c 9.4  10/21/24: CBC with WBC 12.0, Hb 11.2, manual diff with elevated neutrophils          EKG Jan 2023: NSR, 91bpm, Qtc 511  LEON Jan 2023: EF 60, \"No vegetations or other evidence of endocarditis seen \"       The following portions of the patient's history were reviewed and updated as appropriate: allergies, current medications, past family history, past medical history, past social history, past surgical history and problem list.    Review of Systems     Review of Systems   Constitutional:  Negative for appetite change, chills, diaphoresis and fever.   HENT:  Negative for congestion, drooling, ear pain, hearing loss, rhinorrhea, sore throat and trouble swallowing.    Eyes:  Negative for pain, discharge, redness, itching and visual disturbance.   Respiratory:  Positive for cough. Negative for chest tightness, shortness of breath and wheezing.  "   Cardiovascular:  Negative for chest pain, palpitations and leg swelling.   Gastrointestinal:  Negative for abdominal pain, blood in stool, constipation, diarrhea, nausea and vomiting.   Genitourinary:  Negative for difficulty urinating, dysuria and hematuria.   Musculoskeletal:  Positive for gait problem. Negative for arthralgias, back pain and neck pain.   Skin:  Negative for color change.   Neurological:  Negative for dizziness, facial asymmetry, speech difficulty, weakness, light-headedness and headaches.   Psychiatric/Behavioral:  Negative for agitation, behavioral problems and confusion. The patient is not nervous/anxious and is not hyperactive.    All other systems reviewed and are negative.      Active Problem List     Patient Active Problem List   Diagnosis    Esophageal reflux    DM (diabetes mellitus) type II, controlled, with peripheral vascular disorder ([facility])    Class 3 severe obesity due to excess calories with serious comorbidity and body mass index (BMI) of 40.0 to 44.9 in adult ([facility])    Depression, recurrent ([facility])    Anxiety    Hypertension    Diabetic peripheral neuropathy ([facility])    Vitamin D deficiency    Systolic murmur    Familial combined hyperlipidemia    Arthritis    Transaminitis    Moderate protein-calorie malnutrition ([facility])    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 ([facility])    Type 2 diabetes mellitus with hyperglycemia, with long-term current use of insulin ([facility])    Hyperparathyroidism ([facility])    Wound of lower extremity    Non healing left heel wound    Hypokalemia    PAD (peripheral artery disease) ([facility])    Generalized edema due to fluid overload    Anemia    Diabetic ulcer of heel associated with diabetes mellitus due to underlying condition ([facility])    HIT (heparin-induced thrombocytopenia) ([facility])    Diarrhea, unspecified    Mild protein-calorie malnutrition ([facility])    History of CVA  (cerebrovascular accident)    Fall    Troponin level elevated    Hypomagnesemia    Prolonged Q-T interval on ECG    Thrombocytosis    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)    Weight gain    S/P bilateral above knee amputation (HCC)    Acquired hypothyroidism    History of cardiac arrest    Advance care planning    Chest congestion    Leukemoid reaction    Pain in finger of left hand       Objective     Vital Signs:     BP: 128/75 mmHg  12/11/2024 16:30   Temp:97.3 °F  11/30/2024 23:54 Pulse:75 bpm  12/11/2024 09:38 Weight:199.5 Lbs  12/1/2024 10:28   Resp:20 Breaths/min  11/30/2024 23:54 BS:195 mg/dL  12/11/2024 20:36 O2:94 %  11/29/2024 00:43 Pain:0  12/11/2024 15:21       Physical Exam  Vitals reviewed.   Constitutional:       General: She is not in acute distress.     Appearance: She is obese. She is not toxic-appearing or diaphoretic.   HENT:      Head: Normocephalic and atraumatic.      Right Ear: External ear normal.      Left Ear: External ear normal.      Nose: Nose normal. No rhinorrhea.      Mouth/Throat:      Mouth: Mucous membranes are dry.      Pharynx: Oropharynx is clear. No posterior oropharyngeal erythema.   Eyes:      General: No scleral icterus.        Right eye: No discharge.         Left eye: No discharge.      Extraocular Movements: Extraocular movements intact.      Conjunctiva/sclera: Conjunctivae normal.      Pupils: Pupils are equal, round, and reactive to light.   Cardiovascular:      Rate and Rhythm: Normal rate and regular rhythm.   Pulmonary:      Effort: Pulmonary effort is normal. No respiratory distress.      Breath sounds: Normal breath sounds. No wheezing or rales.   Abdominal:      General: Bowel sounds are normal.      Palpations: Abdomen is soft.      Tenderness: There is no abdominal tenderness. There is no guarding.   Musculoskeletal:      Cervical back: No rigidity.      Comments: Bilateral AKA, sites appear well healed without bleed, drainage, or  erythema  Left ring finger at left aspect of nail with small region of swelling with overlying pus-filled blistering, no opening/active bleed/drainage. Distal finger somewhat tender to palpation.   Skin:     General: Skin is warm and dry.      Coloration: Skin is not jaundiced.   Neurological:      General: No focal deficit present.      Mental Status: She is alert and oriented to person, place, and time. Mental status is at baseline.   Psychiatric:         Mood and Affect: Mood normal.         Behavior: Behavior normal.         Thought Content: Thought content normal.         Judgment: Judgment normal.         Pertinent Laboratory/Diagnostic Studies:  Laboratory and Imaging studies reviewed. Full report in the paper chart.     Current Medications   Medications reviewed and updated in facility chart.      -Total time spent on this encounter today including documentation and workup review, face to face time, history and exam, and documentation/orders was approximately 20 minutes.  -This note will be copied to [facility] EMR where vitals and medication orders are placed.    Seb Chun D.O.  Geriatric Medicine  12/11/2024 9:08 PM

## 2024-12-12 NOTE — ASSESSMENT & PLAN NOTE
Patient with persistent mildly elevated WBC since around June 2024  Seems to have coincided with likely viral URI and associated diarrhea around that time however has not improved  Monitor on routine labs - seems fairly mild but persistent as of July 2024 - have ordered recheck CBC for 12/12/24  Monitor for acute infectious symptoms - no recent symptoms. Patient denies any unintentional weight loss, fevers, night sweats, chills, or other overt signs of systemic or malignant pathology.  Have ordered Hematology consult, patient presently scheduled for Jan 2025

## 2024-12-17 ENCOUNTER — TELEPHONE (OUTPATIENT)
Age: 64
End: 2024-12-17

## 2024-12-17 NOTE — TELEPHONE ENCOUNTER
Charline- from [employer] called to request face to face notes that support the request for a power wheel chair.     Please fax to [phone]    Phone: [phone] Ext. [phone]

## 2024-12-19 ENCOUNTER — OFFICE VISIT (OUTPATIENT)
Dept: ENDOCRINOLOGY | Facility: CLINIC | Age: 64
End: 2024-12-19
Payer: COMMERCIAL

## 2024-12-19 VITALS — DIASTOLIC BLOOD PRESSURE: 78 MMHG | SYSTOLIC BLOOD PRESSURE: 148 MMHG

## 2024-12-19 DIAGNOSIS — E11.65 TYPE 2 DIABETES MELLITUS WITH HYPERGLYCEMIA, WITH LONG-TERM CURRENT USE OF INSULIN (HCC): ICD-10-CM

## 2024-12-19 DIAGNOSIS — Z79.4 TYPE 2 DIABETES MELLITUS WITH HYPERGLYCEMIA, WITH LONG-TERM CURRENT USE OF INSULIN (HCC): ICD-10-CM

## 2024-12-19 PROCEDURE — 99214 OFFICE O/P EST MOD 30 MIN: CPT | Performed by: INTERNAL MEDICINE

## 2024-12-19 RX ORDER — INSULIN GLARGINE 100 [IU]/ML
INJECTION, SOLUTION SUBCUTANEOUS
Qty: 10 ML | Refills: 4 | Status: SHIPPED | OUTPATIENT
Start: 2024-12-19

## 2024-12-19 NOTE — PROGRESS NOTES
12/19/2024    Assessment & Plan      Diagnoses and all orders for this visit:    Type 2 diabetes mellitus with hyperglycemia, with long-term current use of insulin (HCC)  -     insulin glargine (LANTUS) 100 units/mL subcutaneous injection; Inject 46 Units under the skin every morning. Dose change  -     Hemoglobin A1C; Future  -     Comprehensive metabolic panel; Future  -     Lipid Panel with Direct LDL reflex; Future  -     CBC and differential; Future  -     TSH, 3rd generation; Future        Assessment/Plan:  1.  Type 2 diabetes: A1c from October was 9.4 which is above goal though since then blood sugars have significantly improved.  We will adjust Lantus to 46 units daily to continue to improve glucose levels.  Continue Trulicity 4.5 mg weekly.  Patient asked about switching to Mounjaro and we will consider this in the future if needed based on blood sugar trends.  Consider upgrading to tavon 3 in the future though right now supply has been limited for this sensor and we will continue tavon 2.  Repeat labs prior to next appointment.      CC: Diabetes follow-up    History of Present Illness     HPI: Dianna Varghese is a 64 y.o. year old female with type 2 diabetes for  over 10  years.  She is on oral agents and insulin at home and takes Lantus 42 units daily, Trulicity 4.5 mg weekly. She denies any polyuria, polydipsia, nocturia and blurry vision.  Diabetes is complicated by history of peripheral neuropathy, peripheral artery disease, bilateral above-the-knee amputations, CVA.  She currently resides at [facility].    Hypoglycemic episodes: No.     Eye exam: Pt states eye dr comes to [facility] every 3-4 months.    Blood Sugar/Glucometer/Pump/CGM review: Patient utilizes freestyle tavon 2 CGM and blood sugars are recorded off that device.  On record provided, glucose readings tend to typically be in the 140-180 range with occasional postprandial values above 200.  No hypoglycemia.    Review of Systems    Constitutional:  Negative for fatigue.   HENT:  Negative for trouble swallowing and voice change.    Eyes:  Negative for visual disturbance.   Respiratory:  Negative for shortness of breath.    Cardiovascular:  Negative for palpitations and leg swelling.   Gastrointestinal:  Negative for abdominal pain, nausea and vomiting.   Endocrine: Negative for polydipsia and polyuria.   Musculoskeletal:  Negative for arthralgias and myalgias.   Skin:  Negative for rash.   Neurological:  Negative for dizziness, tremors and weakness.   Hematological:  Negative for adenopathy.   Psychiatric/Behavioral:  Negative for agitation and confusion.        Historical Information   Past Medical History:   Diagnosis Date    Altered mental status 1/24/2023    Anxiety     Depression     Diabetes (HCC)     Diabetes mellitus (HCC)     Diarrhea 11/14/2022    Epigastric pain 2/20/2022    Esophageal reflux     28 APR 2015 RESOLVED    Fall 1/17/2023    Forehead laceration 1/23/2023    Hyperlipidemia     Hypertension     Low back pain     sciatica    Pneumonia 2019    Stroke (HCC) 12/2015    small vessel, L frontal corona radiata. Rx asa 81, Lipitor 40, risk modification    Vitamin D deficiency      Past Surgical History:   Procedure Laterality Date    AMPUTATION ABOVE KNEE (AKA) Right 12/1/2022    Procedure: (AKA), PSB  BKA;  Surgeon: Johnathan Britton DO;  Location: [facility];  Service: Vascular    ARTERIOGRAM Right 11/7/2022    Procedure: -Right lower extremity angiogram -Right CFA balloon angioplasty with [id number]  Put In [employer]  -Right CFA DCB with 6x40 Bard Lutonix -Right CFA atherectomy with Jetstream and distal embolization protection with 7mm Spider FX -Right SFA/Pop angioplasty with 4x40 Chololate balloon -Right SFA/Pop DCB with 5x150 Bard Lutonix -Right SFA stent with 6x80 Put In [employer] Rosalinda x2 -R    COLONOSCOPY      IR AORTAGRAM WITH RUN-OFF  10/31/2022    IR LOWER EXTREMITY ANGIOGRAM  11/10/2022    IR LOWER  EXTREMITY ANGIOGRAM  2022    MT AMP LEG THRU TIBIA&FIBULA SEC CLOSURE/SCAR REV Right 2023    Procedure: AMPUTATION REVISION STUMP;  Surgeon: Johnathan Britton DO;  Location: [facility];  Service: Vascular    MT AMPUTATION THIGH THROUGH FEMUR ANY LEVEL Left 2022    Procedure: (AKA);  Surgeon: Silvano Thompson DO;  Location: [facility];  Service: Vascular    MT NEGATIVE PRESSURE WOUND THERAPY DME <= 50 SQ CM Bilateral 2022    Procedure: Right above knee amputation washout; vac change; Left above knee amputation debridement; vac placement;  Surgeon: Johnathan Britton DO;  Location: [facility];  Service: Vascular    WOUND DEBRIDEMENT Right 2022    Procedure: AKA STUMP WASHOUT, Left AKA Staple removal. application of wound vac to Right AKA;  Surgeon: Wan Silva MD;  Location: [facility];  Service: Vascular     Social History   Social History     Substance and Sexual Activity   Alcohol Use Never    Comment: OCCASIONAL ALCOHOL USE IN ALL SCRIPTS     Social History     Substance and Sexual Activity   Drug Use No     Social History     Tobacco Use   Smoking Status Former    Current packs/day: 0.00    Types: Cigarettes    Quit date:     Years since quittin.9   Smokeless Tobacco Never     Family History:   Family History   Problem Relation Age of Onset    Diabetes Mother     Lung cancer Mother     Cancer Father     Lung cancer Father     Hypertension Brother     Hypertension Family        Meds/Allergies   Current Outpatient Medications   Medication Sig Dispense Refill    acetaminophen (TYLENOL) 325 mg tablet Take 650 mg by mouth every 4 (four) hours as needed for mild pain      acetaminophen (TYLENOL) 650 mg suppository Insert 650 mg into the rectum every 4 (four) hours as needed for mild pain      amiodarone 100 mg tablet Take 100 mg by mouth daily      amLODIPine (NORVASC) 10 mg tablet TAKE 1 TABLET BY MOUTH EVERY DAY 90 tablet 1    apixaban (ELIQUIS) 5 mg Take 5 mg by mouth 2  (two) times a day      aspirin 81 mg chewable tablet Chew 81 mg daily      atorvastatin (LIPITOR) 40 mg tablet TAKE 1 TABLET BY MOUTH EVERY DAY 90 tablet 1    buPROPion (WELLBUTRIN XL) 150 mg 24 hr tablet Take 1 tablet (150 mg total) by mouth daily 30 tablet 0    calcium carbonate (TUMS) 500 mg chewable tablet Chew 2 tablets every 6 (six) hours as needed for indigestion or heartburn      Continuous Blood Gluc  (FreeStyle Noé 14 Day Emerson) LITO Use 1 Units continuous 1 each 0    Continuous Blood Gluc Sensor (FreeStyle Noé 14 Day Sensor) MISC Use daily as directed for CGM - Change every 14 days 2 each 1    dulaglutide (Trulicity) 4.5 MG/0.5ML injection Inject 0.5 mL (4.5 mg total) under the skin every 7 days - Start after completing 4 weeks of dulaglutide 3 mg weekly for 4 weeks. If tolerating, start dulaglutide 4.5 mg weekly. 2 mL 4    ferrous sulfate 325 (65 Fe) mg tablet Take 325 mg by mouth daily with breakfast      gabapentin (NEURONTIN) 100 mg capsule Take 100 mg by mouth daily at bedtime      hydrALAZINE (APRESOLINE) 100 MG tablet Take 1 tablet (100 mg total) by mouth every 8 (eight) hours  0    hydrochlorothiazide (HYDRODIURIL) 12.5 mg tablet Take 1 tablet (12.5 mg total) by mouth daily Do not start before February 4, 2023.  0    insulin glargine (LANTUS) 100 units/mL subcutaneous injection Inject 46 Units under the skin every morning. Dose change 10 mL 4    Insulin Pen Needle (B-D UF III MINI PEN NEEDLES) 31G X 5 MM MISC USE WITH INSULIN FOUR TIMES DAILY 200 each 3    Lancets 28G MISC Use 1 each 4 (four) times a day 400 each 0    levothyroxine 50 mcg tablet Take 50 mcg by mouth daily      lisinopril (ZESTRIL) 40 mg tablet TAKE 1 TABLET BY MOUTH EVERY DAY 90 tablet 1    metoprolol tartrate (LOPRESSOR) 50 mg tablet Take 1 tablet (50 mg total) by mouth every 12 (twelve) hours  0    Multiple Vitamin (multivitamin) tablet Take 1 tablet by mouth daily      pantoprazole (PROTONIX) 20 mg tablet Take 20  mg by mouth daily      polyethylene glycol (MIRALAX) 17 g packet Take 17 g by mouth daily as needed for constipation      Psyllium 25 % PACK Take 1 packet by mouth daily      saccharomyces boulardii (FLORASTOR) 250 mg capsule Take 250 mg by mouth 2 (two) times a day      sertraline (ZOLOFT) 100 mg tablet TAKE 1 TABLET BY MOUTH EVERY DAY 90 tablet 1    thiamine (VITAMIN B1) 100 mg tablet Take 1 tablet (100 mg total) by mouth daily Do not start before February 4, 2023.  0    travoprost (TRAVATAN-Z) 0.004 % ophthalmic solution Administer 1 drop into the left eye daily at bedtime       No current facility-administered medications for this visit.     Allergies   Allergen Reactions    Heparin Other (See Comments)     History of HIT       Objective   Vitals: Blood pressure 148/78, not currently breastfeeding.  Invasive Devices       None                   Physical Exam  Vitals reviewed.   Constitutional:       General: She is not in acute distress.     Appearance: She is well-developed. She is not diaphoretic.   HENT:      Head: Normocephalic and atraumatic.   Eyes:      Conjunctiva/sclera: Conjunctivae normal.      Pupils: Pupils are equal, round, and reactive to light.   Neck:      Thyroid: No thyromegaly.   Cardiovascular:      Rate and Rhythm: Normal rate and regular rhythm.   Pulmonary:      Effort: Pulmonary effort is normal. No respiratory distress.      Breath sounds: Normal breath sounds.   Abdominal:      General: Bowel sounds are normal.      Palpations: Abdomen is soft.   Musculoskeletal:         General: Normal range of motion.      Cervical back: Normal range of motion and neck supple.   Skin:     General: Skin is warm and dry.      Findings: No rash.   Neurological:      Mental Status: She is alert and oriented to person, place, and time.      Motor: No abnormal muscle tone.   Psychiatric:         Behavior: Behavior normal.         The history was obtained from the review of the chart and from the  "patient.    Lab Results:      In October 2024, A1c was 9.4 at [facility] laboratories    Lab Results   Component Value Date    CREATININE 1.30 06/05/2023    CREATININE 1.39 04/17/2023    CREATININE 1.31 04/13/2023    BUN 41 (H) 03/06/2023    K 4.4 03/06/2023     03/06/2023    CO2 23 03/06/2023     eGFRcr   Date Value Ref Range Status   03/06/2023 63 >59 Final     EXTERNAL EGFR   Date Value Ref Range Status   06/05/2023 10  Final     No components found for: \"MALBCRER\"    Lab Results   Component Value Date    HDL 28 (L) 11/14/2022    TRIG 677 (H) 01/21/2023       Lab Results   Component Value Date     (H) 03/06/2023    AST 74 (H) 03/06/2023    ALKPHOS 254 (H) 03/06/2023       No results found for: \"TSH\", \"FREET4\", \"TSI\"    Recent Results (from the past 60 weeks)   Fingerstick Glucose (POCT)    Collection Time: 10/18/24  8:27 PM   Result Value Ref Range    POC Glucose 270 (H) 65 - 140 mg/dl         Future Appointments   Date Time Provider Department Center   1/14/2025 11:00 AM Lilia Putnam PA-C [facility]       Portions of the record may have been created with voice recognition software. Occasional wrong word or \"sound a like\" substitutions may have occurred due to the inherent limitations of voice recognition software. Read the chart carefully and recognize, using context, where substitutions have occurred.    "

## 2025-01-09 ENCOUNTER — NURSING HOME VISIT (OUTPATIENT)
Dept: GERIATRICS | Facility: OTHER | Age: 65
End: 2025-01-09
Payer: COMMERCIAL

## 2025-01-09 DIAGNOSIS — Z79.4 TYPE 2 DIABETES MELLITUS WITH HYPERGLYCEMIA, WITH LONG-TERM CURRENT USE OF INSULIN (HCC): ICD-10-CM

## 2025-01-09 DIAGNOSIS — E11.65 TYPE 2 DIABETES MELLITUS WITH HYPERGLYCEMIA, WITH LONG-TERM CURRENT USE OF INSULIN (HCC): ICD-10-CM

## 2025-01-09 DIAGNOSIS — R05.1 ACUTE COUGH: Primary | ICD-10-CM

## 2025-01-09 PROCEDURE — 99308 SBSQ NF CARE LOW MDM 20: CPT | Performed by: STUDENT IN AN ORGANIZED HEALTH CARE EDUCATION/TRAINING PROGRAM

## 2025-01-10 NOTE — ASSESSMENT & PLAN NOTE
She notes since about 5-6 days she has had sinus congestion and intermittent cough (with phlegm). No other acute respiratory or urinary symptoms. Afebrile. no associated SOB/wheeze, fever, orthopnea, sore throat. She does not feel acutely feverish, or confused  No clear pattern to the cough  Feels symptoms stable  Differential would include likely a mild viral URI (several residents in building recently with similar URI/enteritis-type symptoms, some tested positive for parainfluenza), less likely allergies (patient denies notable allergies like this), GERD  Continue PPI for GERD, consider titrating dose if issue persists/worsens  Monitor respiratory status - stable on room air  Supportive care, encourage IS as appropriate  Isolation/precautions as per facility protocol  Ordered respiratory viral panel  With discussion and shared decision-making, ordered mucinex BID x7 days  Patient does not feel she needs anything more for this at present. Notes she has her own Afrin spray (reports having used Afrin for over 30 years and that it helps, we did discuss risks of rebound rhinitis) and her own cough drops which are helpful  Consider further workup if persistent/worsening. No imaging indicated at present, lungs sound clear bilaterally, could consider CXR if issue persists/worsens

## 2025-01-10 NOTE — PROGRESS NOTES
[facility]  Facility: [facility]    PROGRESS NOTE  Nursing Home Place of Service: nursing home place of service: POS 32 Unskilled- No Part A Coverage    NAME: Dianna Varghese  : 1960 AGE: 64 y.o. SEX: female MRN: [id number]  DATE OF ENCOUNTER: 2025    Assessment and Plan     Problem List Items Addressed This Visit       Type 2 diabetes mellitus with hyperglycemia, with long-term current use of insulin ([facility])      Lab Results   Component Value Date    HGBA1C 9.8 (H) 10/25/2022     Not yet well controlled  Encourage CCD  Monitor glucose routinely - recent fasting sugars have generally been mid 100s - low 200s  Avoid hypoglycemia  Encourage lifestyle modifications including healthy diet, weight management, exercise as appropriate  Following Endocrine outpatient. Follow up Endocrine/Ophthalmology as appropriate  Continue Lantus, will titrate from 46u to 48u daily, titrate further as appropriate  Trulicity recently increased to 4.5mg subQ weekly  Follow up HgbA1c Q3 months July/Oct//April         Acute cough - Primary    She notes since about 5-6 days she has had sinus congestion and intermittent cough (with phlegm). No other acute respiratory or urinary symptoms. Afebrile. no associated SOB/wheeze, fever, orthopnea, sore throat. She does not feel acutely feverish, or confused  No clear pattern to the cough  Feels symptoms stable  Differential would include likely a mild viral URI (several residents in building recently with similar URI/enteritis-type symptoms, some tested positive for parainfluenza), less likely allergies (patient denies notable allergies like this), GERD  Continue PPI for GERD, consider titrating dose if issue persists/worsens  Monitor respiratory status - stable on room air  Supportive care, encourage IS as appropriate  Isolation/precautions as per facility protocol  Ordered respiratory viral panel  With discussion and shared decision-making, ordered mucinex BID x7  days  Patient does not feel she needs anything more for this at present. Notes she has her own Afrin spray (reports having used Afrin for over 30 years and that it helps, we did discuss risks of rebound rhinitis) and her own cough drops which are helpful  Consider further workup if persistent/worsening. No imaging indicated at present, lungs sound clear bilaterally, could consider CXR if issue persists/worsens                            Chief Complaint     Follow up acute - cough/congestion, also followed up on elevated sugars    History of Present Illness     Dianna Varghese is a 64 y.o. female who was seen today for follow up. PMH including bilateral above knee amputations, cardiac arrest, type 2 DM, TIA, major depressive disorder, peripheral vascular disease, hypertension, hyperlipidemia, and GERD     Asked by nurse to eval patient for recent acute cough/congestion    Patient seen and examined in room  Others present: none  Patient sitting up in bed reading on her phone  Appears comfortable, awake, alert, oriented to situation, able to converse appropriately  Very polite, Aox3, in good spirits, appears to be a reasonable historian, mentation appearing similar to my prior visit. She notes she has been having some cough with phlegm, as well as sinus congestion, since perhaps 5-6 days. She feels the symptoms are stable and overall mild/tolerable. No other acute respiratory symptoms, no SOB/wheeze, she feels she is breathing fine on room air, no orthopnea. No recent acute pain anywhere. Reports good stable appetite, no swallowing trouble, no abd pain/N/V, recently BM regular/normal. Urinating well without acute issue. She generally requires andree lift for transfer and utilizes wheelchair, able to self-propel. No CP/palpitations including on exertion; does have some chronic stable dyspnea on exertion which is unchanged. Denies orthostatic lightheadedness. She does not feel acutely feverish or confused. No acute  "cardiopulmonary, abdominal, or urinary symptoms; see ROS for more details.      No further questions or acute concerns identified.  No further acute concerns per nursing.     Lab Review:   1/18/24: CBC with Hb 10.8; CMP with Cr 1.43, eGFR 41 (recent baseline seems around 40s-60s); TSH 8.20, FT4 0.6; A1c 7.7  3/6/24: CBC with Hb 10.8; BMP generally stable, Cr 1.41, eGFR 42; TSH 7.96  4/18/24: CBC with Hb 10.9; CMP with Cr 1.51, eGFR 39; A1c 9.3; TSH WNL  6/3/24: CBC with WBC 11.8, Hb 10.5  6/10/24: CBC with WBC 12.3, Hb 10.5  6/17/24: CBC with WBC 12.1, Hb 10.5  6/26/24: CBC with WBC 10.7, Hb 10.6; BMP generally stable/non-actionable, Cr 1.08, GFR 58; Mg 1.7  7/17/24: CBC with WBC 11.8, Hb 11.0; CMP generally stable/non-actionable, Cr 1.43, eGFR 41; A1c 9.7  8/14/24: BMP with Cr 1.21, GFR 50  8/28/24: CBC with WBC 11.9, Hb 11.3  10/16/24: CBC with WBC 11.2, Hb 11.6; CMP generally stable/non-actionable, Cr 0.99, eGFR 64; A1c 9.4  10/21/24: CBC with WBC 12.0, Hb 11.2, manual diff with elevated neutrophils  12/12/24: CBC with WBC 11.8, Hb 10.9          EKG Jan 2023: NSR, 91bpm, Qtc 511  LEON Jan 2023: EF 60, \"No vegetations or other evidence of endocarditis seen \"       The following portions of the patient's history were reviewed and updated as appropriate: allergies, current medications, past family history, past medical history, past social history, past surgical history and problem list.    Review of Systems     Review of Systems   Constitutional:  Negative for appetite change, chills, diaphoresis and fever.   HENT:  Positive for congestion. Negative for drooling, ear pain, hearing loss, rhinorrhea, sore throat and trouble swallowing.    Eyes:  Negative for pain, discharge, redness, itching and visual disturbance.   Respiratory:  Positive for cough. Negative for chest tightness, shortness of breath and wheezing.    Cardiovascular:  Negative for chest pain, palpitations and leg swelling.   Gastrointestinal:  Negative " for abdominal pain, blood in stool, constipation, diarrhea, nausea and vomiting.   Genitourinary:  Negative for difficulty urinating, dysuria and hematuria.   Musculoskeletal:  Positive for gait problem. Negative for arthralgias, back pain and neck pain.   Skin:  Negative for color change.   Neurological:  Negative for dizziness, facial asymmetry, speech difficulty, weakness, light-headedness and headaches.   Psychiatric/Behavioral:  Negative for agitation, behavioral problems and confusion. The patient is not nervous/anxious and is not hyperactive.    All other systems reviewed and are negative.      Active Problem List     Patient Active Problem List   Diagnosis    Esophageal reflux    DM (diabetes mellitus) type II, controlled, with peripheral vascular disorder ([facility])    Class 3 severe obesity due to excess calories with serious comorbidity and body mass index (BMI) of 40.0 to 44.9 in adult ([facility])    Depression, recurrent ([facility])    Anxiety    Hypertension    Diabetic peripheral neuropathy ([facility])    Vitamin D deficiency    Systolic murmur    Familial combined hyperlipidemia    Arthritis    Transaminitis    Moderate protein-calorie malnutrition ([facility])    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 ([facility])    Type 2 diabetes mellitus with hyperglycemia, with long-term current use of insulin ([facility])    Hyperparathyroidism ([facility])    Wound of lower extremity    Non healing left heel wound    Hypokalemia    PAD (peripheral artery disease) ([facility])    Generalized edema due to fluid overload    Anemia    Diabetic ulcer of heel associated with diabetes mellitus due to underlying condition ([facility])    HIT (heparin-induced thrombocytopenia) ([facility])    Diarrhea, unspecified    Mild protein-calorie malnutrition ([facility])    History of CVA (cerebrovascular accident)    Fall    Troponin level elevated    Hypomagnesemia    Prolonged Q-T interval on ECG     Thrombocytosis    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)    Weight gain    S/P bilateral above knee amputation ([facility])    Acquired hypothyroidism    History of cardiac arrest    Advance care planning    Chest congestion    Leukemoid reaction    Acute cough    Pain in finger of left hand    Phantom pain after amputation of lower extremity ([facility])       Objective     Vital Signs:     BP: 124/56 mmHg  1/8/2025 16:56 Temp:98.2 °F  1/9/2025 15:40 Pulse:75 bpm  1/9/2025 15:40 Weight:198.5 Lbs  1/2/2025 21:26   Resp:20 Breaths/min  1/9/2025 15:40 BS:136 mg/dL  1/9/2025 15:39 O2:96 %  1/9/2025 15:40 Pain:0  1/9/2025 15:40       Physical Exam  Vitals reviewed.   Constitutional:       General: She is not in acute distress.     Appearance: She is obese. She is not toxic-appearing or diaphoretic.   HENT:      Head: Normocephalic and atraumatic.      Right Ear: External ear normal.      Left Ear: External ear normal.      Nose: Nose normal. No rhinorrhea.      Mouth/Throat:      Mouth: Mucous membranes are dry.      Pharynx: Oropharynx is clear. No posterior oropharyngeal erythema.   Eyes:      General: No scleral icterus.        Right eye: No discharge.         Left eye: No discharge.      Extraocular Movements: Extraocular movements intact.      Conjunctiva/sclera: Conjunctivae normal.      Pupils: Pupils are equal, round, and reactive to light.   Cardiovascular:      Rate and Rhythm: Normal rate and regular rhythm.   Pulmonary:      Effort: Pulmonary effort is normal. No respiratory distress.      Breath sounds: Normal breath sounds. No wheezing or rales.   Abdominal:      General: Bowel sounds are normal.      Palpations: Abdomen is soft.      Tenderness: There is no abdominal tenderness. There is no guarding.   Musculoskeletal:      Cervical back: No rigidity.      Comments: Bilateral AKA, sites appear well healed without bleed, drainage, or erythema  Left ring finger at left aspect of nail with small region of  swelling with overlying pus-filled blistering, no opening/active bleed/drainage. Distal finger mildly tender to palpation but less so than yesterday   Skin:     General: Skin is warm and dry.      Coloration: Skin is not jaundiced.   Neurological:      General: No focal deficit present.      Mental Status: She is alert and oriented to person, place, and time. Mental status is at baseline.   Psychiatric:         Mood and Affect: Mood normal.         Behavior: Behavior normal.         Thought Content: Thought content normal.         Judgment: Judgment normal.         Pertinent Laboratory/Diagnostic Studies:  Laboratory and Imaging studies reviewed. Full report in the paper chart.     Current Medications   Medications reviewed and updated in facility chart.      -Total time spent on this encounter today including documentation and workup review, face to face time, history and exam, and documentation/orders was approximately 25 minutes.  -This note will be copied to [facility] EMR where vitals and medication orders are placed.    Seb Chun D.O.  Geriatric Medicine  1/9/2025 8:10 PM

## 2025-01-10 NOTE — ASSESSMENT & PLAN NOTE
Lab Results   Component Value Date    HGBA1C 9.8 (H) 10/25/2022     Not yet well controlled  Encourage CCD  Monitor glucose routinely - recent fasting sugars have generally been mid 100s - low 200s  Avoid hypoglycemia  Encourage lifestyle modifications including healthy diet, weight management, exercise as appropriate  Following Endocrine outpatient. Follow up Endocrine/Ophthalmology as appropriate  Continue Lantus, will titrate from 46u to 48u daily, titrate further as appropriate  Trulicity recently increased to 4.5mg subQ weekly  Follow up HgbA1c Q3 months July/Oct/Jan/April

## 2025-01-13 ENCOUNTER — NURSING HOME VISIT (OUTPATIENT)
Dept: GERIATRICS | Facility: OTHER | Age: 65
End: 2025-01-13
Payer: COMMERCIAL

## 2025-01-13 DIAGNOSIS — K21.9 GASTROESOPHAGEAL REFLUX DISEASE, UNSPECIFIED WHETHER ESOPHAGITIS PRESENT: ICD-10-CM

## 2025-01-13 DIAGNOSIS — D50.8 IRON DEFICIENCY ANEMIA SECONDARY TO INADEQUATE DIETARY IRON INTAKE: ICD-10-CM

## 2025-01-13 DIAGNOSIS — I10 PRIMARY HYPERTENSION: ICD-10-CM

## 2025-01-13 DIAGNOSIS — F41.9 ANXIETY: ICD-10-CM

## 2025-01-13 DIAGNOSIS — E11.65 TYPE 2 DIABETES MELLITUS WITH HYPERGLYCEMIA, WITH LONG-TERM CURRENT USE OF INSULIN (HCC): Primary | ICD-10-CM

## 2025-01-13 DIAGNOSIS — G54.6 PHANTOM PAIN AFTER AMPUTATION OF LOWER EXTREMITY (HCC): ICD-10-CM

## 2025-01-13 DIAGNOSIS — Z79.4 TYPE 2 DIABETES MELLITUS WITH HYPERGLYCEMIA, WITH LONG-TERM CURRENT USE OF INSULIN (HCC): Primary | ICD-10-CM

## 2025-01-13 DIAGNOSIS — E21.3 HYPERPARATHYROIDISM (HCC): ICD-10-CM

## 2025-01-13 DIAGNOSIS — I73.9 PAD (PERIPHERAL ARTERY DISEASE) (HCC): Chronic | ICD-10-CM

## 2025-01-13 DIAGNOSIS — Z12.9 SCREENING FOR CANCER: ICD-10-CM

## 2025-01-13 DIAGNOSIS — E03.9 ACQUIRED HYPOTHYROIDISM: ICD-10-CM

## 2025-01-13 DIAGNOSIS — Z79.899 POLYPHARMACY: ICD-10-CM

## 2025-01-13 DIAGNOSIS — F33.9 DEPRESSION, RECURRENT (HCC): ICD-10-CM

## 2025-01-13 DIAGNOSIS — D72.823 LEUKEMOID REACTION: ICD-10-CM

## 2025-01-13 DIAGNOSIS — R05.1 ACUTE COUGH: ICD-10-CM

## 2025-01-13 DIAGNOSIS — R94.31 PROLONGED Q-T INTERVAL ON ECG: ICD-10-CM

## 2025-01-13 DIAGNOSIS — E78.49 FAMILIAL COMBINED HYPERLIPIDEMIA: ICD-10-CM

## 2025-01-13 DIAGNOSIS — Z89.612 S/P BILATERAL ABOVE KNEE AMPUTATION (HCC): ICD-10-CM

## 2025-01-13 DIAGNOSIS — Z86.74 HISTORY OF CARDIAC ARREST: ICD-10-CM

## 2025-01-13 DIAGNOSIS — Z86.73 HISTORY OF CVA (CEREBROVASCULAR ACCIDENT): ICD-10-CM

## 2025-01-13 DIAGNOSIS — Z89.611 S/P BILATERAL ABOVE KNEE AMPUTATION (HCC): ICD-10-CM

## 2025-01-13 PROCEDURE — 99310 SBSQ NF CARE HIGH MDM 45: CPT | Performed by: STUDENT IN AN ORGANIZED HEALTH CARE EDUCATION/TRAINING PROGRAM

## 2025-01-14 ENCOUNTER — OFFICE VISIT (OUTPATIENT)
Dept: HEMATOLOGY ONCOLOGY | Facility: CLINIC | Age: 65
End: 2025-01-14
Payer: COMMERCIAL

## 2025-01-14 VITALS
HEIGHT: 63 IN | RESPIRATION RATE: 16 BRPM | BODY MASS INDEX: 35.25 KG/M2 | DIASTOLIC BLOOD PRESSURE: 72 MMHG | OXYGEN SATURATION: 98 % | TEMPERATURE: 97.5 F | HEART RATE: 64 BPM | SYSTOLIC BLOOD PRESSURE: 140 MMHG

## 2025-01-14 DIAGNOSIS — Z53.20 SCREENING FOR HUMAN PAPILLOMAVIRUS (HPV) DECLINED: Primary | ICD-10-CM

## 2025-01-14 DIAGNOSIS — Z12.39 ENCOUNTER FOR SCREENING FOR MALIGNANT NEOPLASM OF BREAST, UNSPECIFIED SCREENING MODALITY: ICD-10-CM

## 2025-01-14 DIAGNOSIS — Z12.11 SCREENING FOR COLON CANCER: ICD-10-CM

## 2025-01-14 DIAGNOSIS — D50.9 MICROCYTIC ANEMIA: ICD-10-CM

## 2025-01-14 DIAGNOSIS — D72.828 NEUTROPHILIA: ICD-10-CM

## 2025-01-14 PROBLEM — Z79.899 POLYPHARMACY: Status: ACTIVE | Noted: 2025-01-14

## 2025-01-14 PROBLEM — Z12.9 SCREENING FOR CANCER: Status: ACTIVE | Noted: 2025-01-14

## 2025-01-14 PROCEDURE — 99215 OFFICE O/P EST HI 40 MIN: CPT | Performed by: PHYSICIAN ASSISTANT

## 2025-01-14 NOTE — ASSESSMENT & PLAN NOTE
Last TSH WNL from April 2024  Continue levothyroxine 50mcg  No apparent acute/associated symptoms   Monitor TSH periodically; Adjust dosing as appropriate  Will order next TSH in April 2025  Patient is following Endocrine outpatient

## 2025-01-14 NOTE — LETTER
2025     Seb Chun DO  [address]  [address]  [place] [address]    Patient: Dianna Varghese   YOB: 1960   Date of Visit: 2025       Dear Dr. Chun:    Thank you for referring Dianna Varghese to me for evaluation. Below are my notes for this consultation.    If you have questions, please do not hesitate to call me. I look forward to following your patient along with you.         Sincerely,        Lilia Putnam PA-C        CC: No Recipients    Lilia Putnam PA-C  2025 12:18 PM  Sign when Signing Visit  Name: Dianna Varghese      : 1960      MRN: [id number]  Encounter Provider: Lilia Putnam PA-C  Encounter Date: 2025   Encounter department: [facility]  :  Assessment & Plan  Neutrophilia  This is a 64-year-old female with past medical history of neutrophilia.  Patient's total white blood cell count has always been at the higher end of normal if not slightly above the upper end of normal.  Over the past 6 months or so, patient's blood work has demonstrated ability of total white blood cell count with levels between 10 and 13,000/unit liter.  Patient has a very mild anemia which is addressed below.    I discussed causes of neutrophilia.  These include but are not limited to:  Primary issues including myeloproliferative neoplasms, Down syndrome, genetic/inherited conditions   Secondary causes which would include infection, inflammation- including non- hematologic malignancies, medications-steroids and G-CSF, aspelnia and Hypospelnism, Pregnancy, cigarette smoking, stress including physical or emotional, vigorous activity.    Given 2 cell line involvement, investigation for leukemia and flow cytometry was recommended.  It is my opinion that it is more likely that neutrophilia is related to underlying inflammatory disorders such as uncontrolled diabetes, peripheral arterial disease.    Blood work will be  completed.  Follow-up appointment in approximately 5 weeks.    Orders:  •  CBC and differential; Future  •  Comprehensive metabolic panel; Future  •  Sedimentation rate, automated; Future  •  C-reactive protein; Future  •  Leukemia/Lymphoma flow cytometry; Future  •  Iron Panel (Includes Ferritin, Iron Sat%, Iron, and TIBC); Future  •  Ferritin; Future  •  BCR/ABL, Fish Manual; Future    Microcytic anemia  Patient was noted to have a very mild microcytic anemia.  Patient is taking oral iron presently.  Recommendation for repeat iron panel in addition to workup for neutrophilia recommended.    Orders:  •  CBC and differential; Future  •  Comprehensive metabolic panel; Future  •  Sedimentation rate, automated; Future  •  C-reactive protein; Future  •  Leukemia/Lymphoma flow cytometry; Future  •  Iron Panel (Includes Ferritin, Iron Sat%, Iron, and TIBC); Future  •  Ferritin; Future  •  BCR/ABL, Fish Manual; Future    Screening for human papillomavirus (HPV) declined  Consoled and declined       Screening for colon cancer  Difficulty using the toilet due to b/l BKA.  Recommend Cologuard as the pt has no family history of Colon Cancer       Encounter for screening for malignant neoplasm of breast, unspecified screening modality  PCP has recently ordered Mammogram, per pt report.            History of Present Illness   Chief Complaint   Patient presents with   • Follow-up     Pertinent Medical History   This is a 64-year-old female with past medical history of bilateral above-the-knee amputations, CVA, type 2 diabetes uncontrolled, TIA, depression, peripheral vascular disease, hypertension, hyperlipidemia and GERD who was referred to the hematology clinic at the request of her primary doctor secondary to leukocytosis/neutrophilia.    Laboratory assessment was serial lysed by Dr. Chun:  1/18/24: CBC with Hb 10.8;   Cr 1.43, eGFR 41 (recent baseline seems around 40s-60s); TSH 8.20  3/6/24: Hb 10.8; BMP generally stable,    Cr 1.41, eGFR 42; TSH 7.96  4/18/24: Hb 10.9;   Cr 1.51, eGFR 39; A1c 9.3; TSH WNL  6/3/24:  WBC 11.8, Hb 10.5    7/17/24: WBC 11.8, Hb 11.0;   Cr 1.43, eGFR 41; A1c 9.7    8/28/24: WBC 11.9, Hb 11.3  10/16/24: WBC 11.2, Hb 11.6;   Cr 0.99, eGFR 64; A1c 9.4  10/21/24: WBC 12.0, Hb 11.2,   Neutrophils- 8.3, hemoglobin A1c 9.4  12/12/24:  WBC 11.8, Hb 10.9    01/14/25: Patient notes below the knee amputation completed secondary to diabetes and complications.  Patient does note a history of peripheral vascular disease.  Patient states no family history of colon cancer no personal history of colon cancer.  Last colonoscopy approximately 5 years ago which was completed for investigation of Crohn's disease which was negative.    Patient has been living in a nursing home.  Diet is dictated by nursing home kitchen.  Patient normally eats what ever is given to her-including all fruits and vegetables.  Patient is not selective regarding any particular foods.    Patient denies recent hospitalization, surgery however she does admit to an upper respiratory cold.     Review of Systems   Constitutional:  Negative for appetite change, fatigue, fever and unexpected weight change.   HENT:  Negative for nosebleeds.    Respiratory:  Negative for cough, choking and shortness of breath.         Negative hemoptysis.   Cardiovascular:  Negative for chest pain, palpitations and leg swelling.   Gastrointestinal: Negative.  Negative for abdominal distention, abdominal pain, anal bleeding, blood in stool, constipation, diarrhea, nausea and vomiting.   Endocrine: Negative.  Negative for cold intolerance.   Genitourinary: Negative.  Negative for hematuria, menstrual problem, vaginal bleeding, vaginal discharge and vaginal pain.   Musculoskeletal: Negative.  Negative for arthralgias, myalgias, neck pain and neck stiffness.   Skin: Negative.  Negative for color change, pallor and rash.   Allergic/Immunologic: Negative.  Negative for  "immunocompromised state.   Neurological: Negative.  Negative for weakness and headaches.   Hematological:  Negative for adenopathy. Does not bruise/bleed easily.   All other systems reviewed and are negative.          Objective   /72 (Patient Position: Sitting, Cuff Size: Large)   Pulse 64   Temp 97.5 °F (36.4 °C) (Temporal)   Resp 16   Ht 5' 3\" (1.6 m)   SpO2 98%   BMI 35.25 kg/m²     Physical Exam  Constitutional:       General: She is not in acute distress.     Appearance: She is well-developed.   HENT:      Head: Normocephalic and atraumatic.   Eyes:      General: No scleral icterus.     Conjunctiva/sclera: Conjunctivae normal.   Cardiovascular:      Rate and Rhythm: Normal rate.   Pulmonary:      Effort: Pulmonary effort is normal. No respiratory distress.   Skin:     General: Skin is warm.      Coloration: Skin is not pale.      Findings: No rash.   Neurological:      Mental Status: She is alert and oriented to person, place, and time.   Psychiatric:         Thought Content: Thought content normal.       Labs: I have reviewed the following labs:  Results for orders placed or performed during the hospital encounter of 10/18/24   Fingerstick Glucose (POCT)   Result Value Ref Range    POC Glucose 270 (H) 65 - 140 mg/dl     *Note: Due to a large number of results and/or encounters for the requested time period, some results have not been displayed. A complete set of results can be found in Results Review.     Administrative Statements  I have spent a total time of 48 minutes in caring for this patient on the day of the visit/encounter including Counseling / Coordination of care, Documenting in the medical record, Reviewing / ordering tests, medicine, procedures  , and Obtaining or reviewing history  .   "

## 2025-01-14 NOTE — ASSESSMENT & PLAN NOTE
Working with PT/OT  Working with prosthetics  Would benefit from use of power/motorized wheelchair due to associated significant difficulty with mobility/ambulation due to lower extremity amputations. She retains good use of upper extremities and appears to have the cognitive and physical ability to safely control a power wheelchair. I had previously completed paperwork at the facility for such a device to be ordered. I discussed with nursing today, unit nurse will follow up with facility staff regarding progress on this.

## 2025-01-14 NOTE — PROGRESS NOTES
[facility]  Facility: [facility]    PROGRESS NOTE  Nursing Home Place of Service: nursing home place of service: POS 32 Unskilled- No Part A Coverage    NAME: Dianna Varghese  : 1960 AGE: 64 y.o. SEX: female MRN: [id number]  DATE OF ENCOUNTER: 2025    Assessment and Plan     Problem List Items Addressed This Visit       Esophageal reflux    -stable per patient  -continue PRN calcium carbonate. With shared decision-making patient amenable to discontinuing pantoprazole 20mg daily at this time  -recommend OOB with meals, sit upright for at least 30 minutes afterwards, avoid trigger foods  -continue to monitor  -follow up with GI as appropriate/needed         Depression, recurrent (HCC)    mood stable and well controlled overall. She endorses feeling well and happy. Mood tends to be down over winter/holidays but well controlled at this time  Psych following  Supportive care  She has been on buproprion 150 mg po daily - with shared decision-making will discontinue at this time (patient notes it was started mainly to help with smoking cessation and she does not feel she needs this for mood any longer)  Continue sertraline 100 mg po daily at bedtime  (Was on quetiapine, has been weaned and discontinued as of 2024 and appears tolerating well without it)         Anxiety    Mood stable  Supportive care  Psych following at facility  Has alprazolam 0.25mg daily PRN in addition to rest of regimen  See plan under Depression           Hypertension    Monitor BP - generally stable, somewhat variable, around 120s-140s systolic, rarely lower or higher  Pulse generally 60s-80s  Avoid hypotension  No acute cardiac complaints  Continue regimen including amlodipine 10mg daily, lisinopril 40mg daily, metoprolol tartrate 50mg BID, HCTZ 25mg daily, hydralazine 100mg TID, amiodarone 100mg daily  Consider weaning/adjusting extensive regimen as appropriate, especially if BP remains well controlled or  becomes softer. For now seems stable on current regimen, have discussed with patient  Following Cardiology outpatient         Familial combined hyperlipidemia    Continue statin          Type 2 diabetes mellitus with hyperglycemia, with long-term current use of insulin (HCC) - Primary      Lab Results   Component Value Date    HGBA1C 9.8 (H) 10/25/2022     Not yet well controlled  Encourage CCD  Monitor glucose routinely - recent fasting sugars have generally been mid 100s - low 200s  Avoid hypoglycemia  Encourage lifestyle modifications including healthy diet, weight management, exercise as appropriate  Following Endocrine outpatient. Follow up Endocrine/Ophthalmology as appropriate  Continue Lantus, will titrate from 48 to 50u daily, titrate further as appropriate  Trulicity recently increased to 4.5mg subQ weekly  Follow up HgbA1c Q3 months July/Oct/Jan/April         Hyperparathyroidism (HCC)    Seems to be primary on chart review  Continue multivitamin/vit D  Monitor calcium on routine labs - has been stable/WNL recently  Following Endocrine outpatient         PAD (peripheral artery disease) (HCC) (Chronic)    S/p left AKA on 11/23/22  S/p right AKA stump revision on 01/27/23  Continue aspirin, statin, Eliquis  Working with rehab regarding prosthetics, PT/OT as appropriate  Follow up with Vascular as appropriate         Anemia    Seems fairly mild, stable on lab review  -monitor on routine labs  -borderline microcytic and hx of low iron on lab review - continue ferrous sulfate PO qOD  -likely component of CKD as well  -monitor for acute bleed - no present signs  -consider further workup, Heme consult if persistent/worsening  -transfuse PRN Hb <7  -low threshold to hold anticoagulation if evidence of bleed or worsening Hb         History of CVA (cerebrovascular accident)    Pt had right CVA 11/14/2022  Continue aspirin, statin  Pt has been off plavix since 03/2023 after vascular visit         Prolonged Q-T  interval on ECG    Noted on prior EKGs  No apparent acute/associated symptoms  Avoid QT prolonging medications as able  Following with Cardiology         S/P bilateral above knee amputation (HCC)    Working with PT/OT  Working with prosthetics  Would benefit from use of power/motorized wheelchair due to associated significant difficulty with mobility/ambulation due to lower extremity amputations. She retains good use of upper extremities and appears to have the cognitive and physical ability to safely control a power wheelchair. I had previously completed paperwork at the facility for such a device to be ordered. I discussed with nursing today, unit nurse will follow up with facility staff regarding progress on this.          Acquired hypothyroidism    Last TSH WNL from April 2024  Continue levothyroxine 50mcg  No apparent acute/associated symptoms   Monitor TSH periodically; Adjust dosing as appropriate  Will order next TSH in April 2025  Patient is following Endocrine outpatient         History of cardiac arrest    S/p cardiac arrest on 1/18/2023 considered due to electrolyte abnormalities/severe sepsis  No recent/acute cardiac complaints  Continue cardiac regimen including aspirin, Eliquis, amiodarone, atorvastatin, beta blocker  Follow up with Cardiology as appropriate/yearly; had recent Cardio visit 4/8/24 and considered stable         Leukemoid reaction    Patient with persistent mildly elevated WBC since around June 2024  Seems to have coincided with likely viral URI and associated diarrhea around that time however has not improved  Monitor on routine labs - seems fairly mild but persistent as of Dec 2024  Monitor for acute infectious symptoms  Patient denies any unintentional weight loss, fevers, night sweats, chills, or other overt signs of systemic or malignant pathology.  Have ordered Hematology consult, patient has appointment tomorrow         Acute cough    She notes since last week she has had sinus  "congestion and intermittent cough (with phlegm). No other acute respiratory or urinary symptoms. Afebrile. no associated SOB/wheeze, fever, orthopnea, sore throat. She does not feel acutely feverish, or confused  differential would include likely a mild viral URI (several residents in building recently with similar URI/enteritis-type symptoms, some tested positive for parainfluenza), less likely allergies (patient denies notable allergies like this), GERD  Continue PPI for GERD, consider titrating dose if issue persists/worsens  Monitor respiratory status - stable on room air  Supportive care, encourage IS as appropriate  Isolation/precautions as per facility protocol  respiratory viral panel - was negative  Continue mucinex as ordered  Patient does not feel she needs anything more for this at present. Notes she has her own Afrin spray (reports having used Afrin for over 30 years and that it helps, we did discuss risks of rebound rhinitis) and her own cough drops which are helpful  Continue to monitor - much improving at this time           Phantom pain after amputation of lower extremity (HCC)    Phantom limb pains L>R lower extremity, intermittent, at random times but generally worse overnight  Gabapentin recently titrated to 200mg BID, she thinks this has been very helpful, the phantom pains are much improved and no longer bothering her lately, she feels stable with current dosing  Continue to monitor         Screening for cancer    No prior mammogram - discussed with patient, she is amenable to screening mammogram - ordering bilat screening mammogram    Last colonoscopy appears to be in Feb 2020  \"IMPRESSION:  ? Small external hemorrhoids  ? Performed multiple biopsies in the terminal ileum and throughout the colon  ? Ten or more polyps in the splenic flexure, descending colon and sigmoid colon; performed cold biopsy. Psuedopolyps. Random biopsies obtained. Too numerous too remove them all.  ? The examination " "was otherwise normal on direct and retroflexion views. \"  She was recommended a repeat colonoscopy 1 year subsequently but has not been done yet. I discussed with patient, she prefers to defer further colonoscopy at this time until she feels more functional with her prosthetics/mobility standpoint              Polypharmacy    Patient on numerous prescribed medications including multiple BP medications, several for mood and pain  Extensive med list review and discussion with patient today  With shared decision-making, discontinuation of bupropion and pantoprazole as per rest of plan  Continue to look for opportunities to streamline regimen                            Chief Complaint     Follow up 30 day    History of Present Illness     Dianna Varghese is a 64 y.o. female who was seen today for follow up. PMH including bilateral above knee amputations, cardiac arrest, type 2 DM, TIA, major depressive disorder, peripheral vascular disease, hypertension, hyperlipidemia, and GERD       Patient seen and examined in room  Others present: none  Patient sitting up in bed working on her phone  Appears comfortable, awake, alert, oriented to situation, able to converse appropriately  Very polite, Aox3, in good spirits, appears to be a reasonable historian, mentation appearing similar to my prior visit. She notes she feels well overall, no new/acute concerns today. She was feeling under the weather last week, feels improving at this time, still having some mild intermittent cough with phlegm, as well as sinus congestion, since perhaps 10 days. She feels the symptoms are improving and much better than last week. No other acute respiratory symptoms, no SOB/wheeze, she feels she is breathing fine on room air, no orthopnea. No recent acute pain anywhere. Reports good stable appetite, no swallowing trouble, no abd pain/N/V, recently BM regular/normal. Urinating well without acute issue. She generally requires andree lift for transfer " "and utilizes wheelchair, able to self-propel. No CP/palpitations including on exertion; does have some chronic stable dyspnea on exertion which is unchanged. Denies orthostatic lightheadedness.  Sleeping well. No acute pain anywhere; she has had intermittent phantom limb pains L>R, generally worse overnight, for which gabapentin has been very helpful and the issue is much improved compared to previously.  She does not feel acutely feverish or confused. No acute cardiopulmonary, abdominal, or urinary symptoms; see ROS for more details.      No further questions or acute concerns identified.  No further acute concerns per nursing.     Lab Review:   1/18/24: CBC with Hb 10.8; CMP with Cr 1.43, eGFR 41 (recent baseline seems around 40s-60s); TSH 8.20, FT4 0.6; A1c 7.7  3/6/24: CBC with Hb 10.8; BMP generally stable, Cr 1.41, eGFR 42; TSH 7.96  4/18/24: CBC with Hb 10.9; CMP with Cr 1.51, eGFR 39; A1c 9.3; TSH WNL  6/3/24: CBC with WBC 11.8, Hb 10.5  6/10/24: CBC with WBC 12.3, Hb 10.5  6/17/24: CBC with WBC 12.1, Hb 10.5  6/26/24: CBC with WBC 10.7, Hb 10.6; BMP generally stable/non-actionable, Cr 1.08, GFR 58; Mg 1.7  7/17/24: CBC with WBC 11.8, Hb 11.0; CMP generally stable/non-actionable, Cr 1.43, eGFR 41; A1c 9.7  8/14/24: BMP with Cr 1.21, GFR 50  8/28/24: CBC with WBC 11.9, Hb 11.3  10/16/24: CBC with WBC 11.2, Hb 11.6; CMP generally stable/non-actionable, Cr 0.99, eGFR 64; A1c 9.4  10/21/24: CBC with WBC 12.0, Hb 11.2, manual diff with elevated neutrophils  12/12/24: CBC with WBC 11.8, Hb 10.9  1/9/25: respiratory viral panel negative          EKG Jan 2023: NSR, 91bpm, Qtc 511  LEON Jan 2023: EF 60, \"No vegetations or other evidence of endocarditis seen \"       The following portions of the patient's history were reviewed and updated as appropriate: allergies, current medications, past family history, past medical history, past social history, past surgical history and problem list.    Review of Systems     Review " of Systems   Constitutional:  Negative for appetite change, chills, diaphoresis and fever.   HENT:  Positive for congestion (improving). Negative for drooling, ear pain, hearing loss, rhinorrhea, sore throat and trouble swallowing.    Eyes:  Negative for pain, discharge, redness, itching and visual disturbance.   Respiratory:  Positive for cough (improving). Negative for chest tightness, shortness of breath and wheezing.    Cardiovascular:  Negative for chest pain, palpitations and leg swelling.   Gastrointestinal:  Negative for abdominal pain, blood in stool, constipation, diarrhea, nausea and vomiting.   Genitourinary:  Negative for difficulty urinating, dysuria, flank pain and hematuria.   Musculoskeletal:  Positive for gait problem. Negative for arthralgias, back pain and neck pain.   Skin:  Negative for color change.   Neurological:  Negative for dizziness, facial asymmetry, speech difficulty, weakness, light-headedness and headaches.   Psychiatric/Behavioral:  Negative for agitation, behavioral problems and confusion. The patient is not nervous/anxious and is not hyperactive.    All other systems reviewed and are negative.      Active Problem List     Patient Active Problem List   Diagnosis    Esophageal reflux    DM (diabetes mellitus) type II, controlled, with peripheral vascular disorder ([facility])    Class 3 severe obesity due to excess calories with serious comorbidity and body mass index (BMI) of 40.0 to 44.9 in adult ([facility])    Depression, recurrent ([facility])    Anxiety    Hypertension    Diabetic peripheral neuropathy ([facility])    Vitamin D deficiency    Systolic murmur    Familial combined hyperlipidemia    Arthritis    Transaminitis    Moderate protein-calorie malnutrition ([facility])    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 ([facility])    Type 2 diabetes mellitus with hyperglycemia, with long-term current use of insulin ([facility])     Hyperparathyroidism (HCC)    Wound of lower extremity    Non healing left heel wound    Hypokalemia    PAD (peripheral artery disease) (HCC)    Generalized edema due to fluid overload    Anemia    Diabetic ulcer of heel associated with diabetes mellitus due to underlying condition (HCC)    HIT (heparin-induced thrombocytopenia) (HCC)    Diarrhea, unspecified    Mild protein-calorie malnutrition (HCC)    History of CVA (cerebrovascular accident)    Fall    Troponin level elevated    Hypomagnesemia    Prolonged Q-T interval on ECG    Thrombocytosis    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)    Weight gain    S/P bilateral above knee amputation (HCC)    Acquired hypothyroidism    History of cardiac arrest    Advance care planning    Chest congestion    Leukemoid reaction    Acute cough    Pain in finger of left hand    Phantom pain after amputation of lower extremity (HCC)    Screening for cancer    Polypharmacy       Objective     Vital Signs:     BP: 124/56 mmHg  1/8/2025 16:56   Temp:97.6 °F  1/11/2025 01:03 Pulse:75 bpm  1/9/2025 15:40 Weight:198.5 Lbs  1/2/2025 21:26   Resp:20 Breaths/min  1/9/2025 15:40 BS:107 mg/dL  1/13/2025 17:14 O2:96 %  1/9/2025 15:40 Pain:0  1/13/2025 18:15       Physical Exam  Vitals reviewed.   Constitutional:       General: She is not in acute distress.     Appearance: She is obese. She is not toxic-appearing or diaphoretic.   HENT:      Head: Normocephalic and atraumatic.      Right Ear: External ear normal.      Left Ear: External ear normal.      Nose: Nose normal. No rhinorrhea.      Mouth/Throat:      Mouth: Mucous membranes are dry.      Pharynx: Oropharynx is clear. No posterior oropharyngeal erythema.   Eyes:      General: No scleral icterus.        Right eye: No discharge.         Left eye: No discharge.      Extraocular Movements: Extraocular movements intact.      Conjunctiva/sclera: Conjunctivae normal.      Pupils: Pupils are equal, round, and reactive to  light.   Cardiovascular:      Rate and Rhythm: Normal rate and regular rhythm.   Pulmonary:      Effort: Pulmonary effort is normal. No respiratory distress.      Breath sounds: Normal breath sounds. No wheezing or rales.   Abdominal:      General: Bowel sounds are normal.      Palpations: Abdomen is soft.      Tenderness: There is no abdominal tenderness. There is no guarding.   Musculoskeletal:      Cervical back: No rigidity.      Comments: Bilateral AKA, sites appear well healed without bleed, drainage, or erythema   Skin:     General: Skin is warm and dry.      Coloration: Skin is not jaundiced.   Neurological:      General: No focal deficit present.      Mental Status: She is alert and oriented to person, place, and time. Mental status is at baseline.   Psychiatric:         Mood and Affect: Mood normal.         Behavior: Behavior normal.         Thought Content: Thought content normal.         Judgment: Judgment normal.         Pertinent Laboratory/Diagnostic Studies:  Laboratory and Imaging studies reviewed. Full report in the paper chart.     Current Medications   Medications reviewed and updated in facility chart.      -Total time spent on this encounter today including documentation and workup review, face to face time, history and exam, and documentation/orders, polypharmacy education/discussion, cancer screening education/discussion was approximately 60 minutes.  -This note will be copied to [facility] EMR where vitals and medication orders are placed.    Seb Chun D.O.  Geriatric Medicine  1/14/2025 2:24 PM

## 2025-01-14 NOTE — PATIENT INSTRUCTIONS
[facility] Oncology and Hematology Team  [place] - [phone]    Your Team Member:  Advanced Practitioner:  Lilia Putnam PA-C    Please answer Private and Unavailable Calls - this may be your team(s) contacting you.  If you have medical questions/concerns/issues - contact us either by (1) My Chart (2) [facility]     Chronic Neutrophilia-suspect secondary to underlying inflammatory condition including uncontrolled diabetes.    Will myeloproliferative disorder and screen for leukemia via flow cytometry.  Patient presently on oral iron however patient has microcytic anemia.

## 2025-01-14 NOTE — ASSESSMENT & PLAN NOTE
S/p left AKA on 11/23/22  S/p right AKA stump revision on 01/27/23  Continue aspirin, statin, Eliquis  Working with rehab regarding prosthetics, PT/OT as appropriate  Follow up with Vascular as appropriate   swelling and ecchymosis to dorsum of left foot/bruised (ecchymotic)

## 2025-01-14 NOTE — ASSESSMENT & PLAN NOTE
-stable per patient  -continue PRN calcium carbonate. With shared decision-making patient amenable to discontinuing pantoprazole 20mg daily at this time  -recommend OOB with meals, sit upright for at least 30 minutes afterwards, avoid trigger foods  -continue to monitor  -follow up with GI as appropriate/needed

## 2025-01-14 NOTE — ASSESSMENT & PLAN NOTE
She notes since last week she has had sinus congestion and intermittent cough (with phlegm). No other acute respiratory or urinary symptoms. Afebrile. no associated SOB/wheeze, fever, orthopnea, sore throat. She does not feel acutely feverish, or confused  differential would include likely a mild viral URI (several residents in building recently with similar URI/enteritis-type symptoms, some tested positive for parainfluenza), less likely allergies (patient denies notable allergies like this), GERD  Continue PPI for GERD, consider titrating dose if issue persists/worsens  Monitor respiratory status - stable on room air  Supportive care, encourage IS as appropriate  Isolation/precautions as per facility protocol  respiratory viral panel - was negative  Continue mucinex as ordered  Patient does not feel she needs anything more for this at present. Notes she has her own Afrin spray (reports having used Afrin for over 30 years and that it helps, we did discuss risks of rebound rhinitis) and her own cough drops which are helpful  Continue to monitor - much improving at this time

## 2025-01-14 NOTE — ASSESSMENT & PLAN NOTE
Monitor BP - generally stable, somewhat variable, around 120s-140s systolic, rarely lower or higher  Pulse generally 60s-80s  Avoid hypotension  No acute cardiac complaints  Continue regimen including amlodipine 10mg daily, lisinopril 40mg daily, metoprolol tartrate 50mg BID, HCTZ 25mg daily, hydralazine 100mg TID, amiodarone 100mg daily  Consider weaning/adjusting extensive regimen as appropriate, especially if BP remains well controlled or becomes softer. For now seems stable on current regimen, have discussed with patient  Following Cardiology outpatient

## 2025-01-14 NOTE — ASSESSMENT & PLAN NOTE
Patient with persistent mildly elevated WBC since around June 2024  Seems to have coincided with likely viral URI and associated diarrhea around that time however has not improved  Monitor on routine labs - seems fairly mild but persistent as of Dec 2024  Monitor for acute infectious symptoms  Patient denies any unintentional weight loss, fevers, night sweats, chills, or other overt signs of systemic or malignant pathology.  Have ordered Hematology consult, patient has appointment tomorrow

## 2025-01-14 NOTE — PROGRESS NOTES
Name: Dianna Varghese      : 1960      MRN: [id number]  Encounter Provider: Lilia Putnam PA-C  Encounter Date: 2025   Encounter department: [facility]  :  Assessment & Plan  Neutrophilia  This is a 64-year-old female with past medical history of neutrophilia.  Patient's total white blood cell count has always been at the higher end of normal if not slightly above the upper end of normal.  Over the past 6 months or so, patient's blood work has demonstrated ability of total white blood cell count with levels between 10 and 13,000/unit liter.  Patient has a very mild anemia which is addressed below.    I discussed causes of neutrophilia.  These include but are not limited to:  Primary issues including myeloproliferative neoplasms, Down syndrome, genetic/inherited conditions   Secondary causes which would include infection, inflammation- including non- hematologic malignancies, medications-steroids and G-CSF, aspelnia and Hypospelnism, Pregnancy, cigarette smoking, stress including physical or emotional, vigorous activity.    Given 2 cell line involvement, investigation for leukemia and flow cytometry was recommended.  It is my opinion that it is more likely that neutrophilia is related to underlying inflammatory disorders such as uncontrolled diabetes, peripheral arterial disease.    Blood work will be completed.  Follow-up appointment in approximately 5 weeks.    Orders:    CBC and differential; Future    Comprehensive metabolic panel; Future    Sedimentation rate, automated; Future    C-reactive protein; Future    Leukemia/Lymphoma flow cytometry; Future    Iron Panel (Includes Ferritin, Iron Sat%, Iron, and TIBC); Future    Ferritin; Future    BCR/ABL, Fish Manual; Future    Microcytic anemia  Patient was noted to have a very mild microcytic anemia.  Patient is taking oral iron presently.  Recommendation for repeat iron panel in addition to workup for  neutrophilia recommended.    Orders:    CBC and differential; Future    Comprehensive metabolic panel; Future    Sedimentation rate, automated; Future    C-reactive protein; Future    Leukemia/Lymphoma flow cytometry; Future    Iron Panel (Includes Ferritin, Iron Sat%, Iron, and TIBC); Future    Ferritin; Future    BCR/ABL, Fish Manual; Future    Screening for human papillomavirus (HPV) declined  Consoled and declined       Screening for colon cancer  Difficulty using the toilet due to b/l BKA.  Recommend Cologuard as the pt has no family history of Colon Cancer       Encounter for screening for malignant neoplasm of breast, unspecified screening modality  PCP has recently ordered Mammogram, per pt report.            History of Present Illness   Chief Complaint   Patient presents with    Follow-up     Pertinent Medical History   This is a 64-year-old female with past medical history of bilateral above-the-knee amputations, CVA, type 2 diabetes uncontrolled, TIA, depression, peripheral vascular disease, hypertension, hyperlipidemia and GERD who was referred to the hematology clinic at the request of her primary doctor secondary to leukocytosis/neutrophilia.    Laboratory assessment was serial lysed by Dr. Chun:  1/18/24: CBC with Hb 10.8;   Cr 1.43, eGFR 41 (recent baseline seems around 40s-60s); TSH 8.20  3/6/24: Hb 10.8; BMP generally stable,   Cr 1.41, eGFR 42; TSH 7.96  4/18/24: Hb 10.9;   Cr 1.51, eGFR 39; A1c 9.3; TSH WNL  6/3/24:  WBC 11.8, Hb 10.5    7/17/24: WBC 11.8, Hb 11.0;   Cr 1.43, eGFR 41; A1c 9.7    8/28/24: WBC 11.9, Hb 11.3  10/16/24: WBC 11.2, Hb 11.6;   Cr 0.99, eGFR 64; A1c 9.4  10/21/24: WBC 12.0, Hb 11.2,   Neutrophils- 8.3, hemoglobin A1c 9.4  12/12/24:  WBC 11.8, Hb 10.9    01/14/25: Patient notes below the knee amputation completed secondary to diabetes and complications.  Patient does note a history of peripheral vascular disease.  Patient states no family history of colon cancer no  "personal history of colon cancer.  Last colonoscopy approximately 5 years ago which was completed for investigation of Crohn's disease which was negative.    Patient has been living in a nursing home.  Diet is dictated by nursing home kitchen.  Patient normally eats what ever is given to her-including all fruits and vegetables.  Patient is not selective regarding any particular foods.    Patient denies recent hospitalization, surgery however she does admit to an upper respiratory cold.     Review of Systems   Constitutional:  Negative for appetite change, fatigue, fever and unexpected weight change.   HENT:  Negative for nosebleeds.    Respiratory:  Negative for cough, choking and shortness of breath.         Negative hemoptysis.   Cardiovascular:  Negative for chest pain, palpitations and leg swelling.   Gastrointestinal: Negative.  Negative for abdominal distention, abdominal pain, anal bleeding, blood in stool, constipation, diarrhea, nausea and vomiting.   Endocrine: Negative.  Negative for cold intolerance.   Genitourinary: Negative.  Negative for hematuria, menstrual problem, vaginal bleeding, vaginal discharge and vaginal pain.   Musculoskeletal: Negative.  Negative for arthralgias, myalgias, neck pain and neck stiffness.   Skin: Negative.  Negative for color change, pallor and rash.   Allergic/Immunologic: Negative.  Negative for immunocompromised state.   Neurological: Negative.  Negative for weakness and headaches.   Hematological:  Negative for adenopathy. Does not bruise/bleed easily.   All other systems reviewed and are negative.          Objective   /72 (Patient Position: Sitting, Cuff Size: Large)   Pulse 64   Temp 97.5 °F (36.4 °C) (Temporal)   Resp 16   Ht 5' 3\" (1.6 m)   SpO2 98%   BMI 35.25 kg/m²     Physical Exam  Constitutional:       General: She is not in acute distress.     Appearance: She is well-developed.   HENT:      Head: Normocephalic and atraumatic.   Eyes:      General: No " scleral icterus.     Conjunctiva/sclera: Conjunctivae normal.   Cardiovascular:      Rate and Rhythm: Normal rate.   Pulmonary:      Effort: Pulmonary effort is normal. No respiratory distress.   Skin:     General: Skin is warm.      Coloration: Skin is not pale.      Findings: No rash.   Neurological:      Mental Status: She is alert and oriented to person, place, and time.   Psychiatric:         Thought Content: Thought content normal.       Labs: I have reviewed the following labs:  Results for orders placed or performed during the hospital encounter of 10/18/24   Fingerstick Glucose (POCT)   Result Value Ref Range    POC Glucose 270 (H) 65 - 140 mg/dl     *Note: Due to a large number of results and/or encounters for the requested time period, some results have not been displayed. A complete set of results can be found in Results Review.     Administrative Statements   I have spent a total time of 48 minutes in caring for this patient on the day of the visit/encounter including Counseling / Coordination of care, Documenting in the medical record, Reviewing / ordering tests, medicine, procedures  , and Obtaining or reviewing history  .

## 2025-01-14 NOTE — ASSESSMENT & PLAN NOTE
"No prior mammogram - discussed with patient, she is amenable to screening mammogram - ordering bilat screening mammogram    Last colonoscopy appears to be in Feb 2020  \"IMPRESSION:  ? Small external hemorrhoids  ? Performed multiple biopsies in the terminal ileum and throughout the colon  ? Ten or more polyps in the splenic flexure, descending colon and sigmoid colon; performed cold biopsy. Psuedopolyps. Random biopsies obtained. Too numerous too remove them all.  ? The examination was otherwise normal on direct and retroflexion views. \"  She was recommended a repeat colonoscopy 1 year subsequently but has not been done yet. I discussed with patient, she prefers to defer further colonoscopy at this time until she feels more functional with her prosthetics/mobility standpoint       "

## 2025-01-14 NOTE — ASSESSMENT & PLAN NOTE
Lab Results   Component Value Date    HGBA1C 9.8 (H) 10/25/2022     Not yet well controlled  Encourage CCD  Monitor glucose routinely - recent fasting sugars have generally been mid 100s - low 200s  Avoid hypoglycemia  Encourage lifestyle modifications including healthy diet, weight management, exercise as appropriate  Following Endocrine outpatient. Follow up Endocrine/Ophthalmology as appropriate  Continue Lantus, will titrate from 48 to 50u daily, titrate further as appropriate  Trulicity recently increased to 4.5mg subQ weekly  Follow up HgbA1c Q3 months July/Oct/Jan/April

## 2025-01-14 NOTE — ASSESSMENT & PLAN NOTE
Patient on numerous prescribed medications including multiple BP medications, several for mood and pain  Extensive med list review and discussion with patient today  With shared decision-making, discontinuation of bupropion and pantoprazole as per rest of plan  Continue to look for opportunities to streamline regimen

## 2025-01-14 NOTE — ASSESSMENT & PLAN NOTE
Phantom limb pains L>R lower extremity, intermittent, at random times but generally worse overnight  Gabapentin recently titrated to 200mg BID, she thinks this has been very helpful, the phantom pains are much improved and no longer bothering her lately, she feels stable with current dosing  Continue to monitor

## 2025-01-14 NOTE — ASSESSMENT & PLAN NOTE
mood stable and well controlled overall. She endorses feeling well and happy. Mood tends to be down over winter/holidays but well controlled at this time  Psych following  Supportive care  She has been on buproprion 150 mg po daily - with shared decision-making will discontinue at this time (patient notes it was started mainly to help with smoking cessation and she does not feel she needs this for mood any longer)  Continue sertraline 100 mg po daily at bedtime  (Was on quetiapine, has been weaned and discontinued as of April 2024 and appears tolerating well without it)

## 2025-01-14 NOTE — ASSESSMENT & PLAN NOTE
RAMY Salgado Rheum Nurse Ms Pool   Cc: P [facility] Pool [facility]             Prolia 60 mg -  Approved - Pending Patient Contact     Authorization not required by insurance     Please send script to [facility]     Pharmacy Coverage [employer]   Patient's Co-Pay: $0     Co pay Assistance Information   Assistance not required.       Additional Information:   Left message: for a call back to inform the patient.     Theodora Young   2/27/20         Seems fairly mild, stable on lab review  -monitor on routine labs  -borderline microcytic and hx of low iron on lab review - continue ferrous sulfate PO qOD  -likely component of CKD as well  -monitor for acute bleed - no present signs  -consider further workup, Heme consult if persistent/worsening  -transfuse PRN Hb <7  -low threshold to hold anticoagulation if evidence of bleed or worsening Hb

## 2025-02-08 PROBLEM — R05.1 ACUTE COUGH: Status: RESOLVED | Noted: 2024-10-17 | Resolved: 2025-02-08

## 2025-02-13 ENCOUNTER — NURSING HOME VISIT (OUTPATIENT)
Dept: GERIATRICS | Facility: OTHER | Age: 65
End: 2025-02-13
Payer: COMMERCIAL

## 2025-02-13 VITALS
HEART RATE: 73 BPM | WEIGHT: 199.5 LBS | OXYGEN SATURATION: 96 % | BODY MASS INDEX: 35.34 KG/M2 | DIASTOLIC BLOOD PRESSURE: 67 MMHG | SYSTOLIC BLOOD PRESSURE: 152 MMHG | RESPIRATION RATE: 20 BRPM | TEMPERATURE: 96.8 F

## 2025-02-13 DIAGNOSIS — F33.9 DEPRESSION, RECURRENT (HCC): ICD-10-CM

## 2025-02-13 DIAGNOSIS — Z86.73 HISTORY OF CVA (CEREBROVASCULAR ACCIDENT): ICD-10-CM

## 2025-02-13 DIAGNOSIS — I10 PRIMARY HYPERTENSION: Primary | ICD-10-CM

## 2025-02-13 DIAGNOSIS — E78.49 FAMILIAL COMBINED HYPERLIPIDEMIA: ICD-10-CM

## 2025-02-13 DIAGNOSIS — S78.119D: ICD-10-CM

## 2025-02-13 DIAGNOSIS — E11.51 DM (DIABETES MELLITUS) TYPE II, CONTROLLED, WITH PERIPHERAL VASCULAR DISORDER (HCC): ICD-10-CM

## 2025-02-13 DIAGNOSIS — D50.8 IRON DEFICIENCY ANEMIA SECONDARY TO INADEQUATE DIETARY IRON INTAKE: ICD-10-CM

## 2025-02-13 DIAGNOSIS — E03.9 ACQUIRED HYPOTHYROIDISM: ICD-10-CM

## 2025-02-13 DIAGNOSIS — N18.30 CHRONIC RENAL INSUFFICIENCY, STAGE 3 (MODERATE) (HCC): ICD-10-CM

## 2025-02-13 DIAGNOSIS — Z79.899 POLYPHARMACY: ICD-10-CM

## 2025-02-13 DIAGNOSIS — E21.3 HYPERPARATHYROIDISM (HCC): ICD-10-CM

## 2025-02-13 DIAGNOSIS — H40.1121 PRIMARY OPEN ANGLE GLAUCOMA (POAG) OF LEFT EYE, MILD STAGE: ICD-10-CM

## 2025-02-13 DIAGNOSIS — K21.9 GASTROESOPHAGEAL REFLUX DISEASE, UNSPECIFIED WHETHER ESOPHAGITIS PRESENT: ICD-10-CM

## 2025-02-13 PROBLEM — Z12.9 SCREENING FOR CANCER: Status: RESOLVED | Noted: 2025-01-14 | Resolved: 2025-02-13

## 2025-02-13 PROBLEM — M79.645 PAIN IN FINGER OF LEFT HAND: Status: RESOLVED | Noted: 2024-12-11 | Resolved: 2025-02-13

## 2025-02-13 PROCEDURE — 99309 SBSQ NF CARE MODERATE MDM 30: CPT | Performed by: INTERNAL MEDICINE

## 2025-02-13 NOTE — ASSESSMENT & PLAN NOTE
Blood pressure noted to be elevated patient has been taking lisinopril 40 mg orally daily, hydralazine 100 mg 3 times a day, hydrochlorothiazide 25 mg orally daily, amlodipine 10 mg orally daily, metoprolol to tartrate 50 mg every 12 hours.  Will need to monitor blood pressure closely.  Like to see her systolics between 120-130.

## 2025-02-13 NOTE — ASSESSMENT & PLAN NOTE
Lab Results   Component Value Date    HGBA1C 9.4 10/16/2024   Globin A1c was 8.3 which is a marked improvement.   oral

## 2025-02-13 NOTE — ASSESSMENT & PLAN NOTE
Patient noted to have history of glaucoma in her left eye currently receiving latanoprost solution 0.005% at bedtime

## 2025-02-13 NOTE — PROGRESS NOTES
[facility]  Landry King MD FACP[facility]  Progress Note dictated at [facility] POS 32  Time spent on dictation 55 minutes 10 minutes reviewing chart 45 minutes evaluating patient and dictating note.      NAME: Dianna Varghese  AGE: 64 y.o. SEX: female [phone]    DATE OF ENCOUNTER: 2/13/2025    Assessment and Plan     Problem List Items Addressed This Visit          Cardiovascular and Mediastinum    DM (diabetes mellitus) type II, controlled, with peripheral vascular disorder (HCC)      Lab Results   Component Value Date    HGBA1C 9.4 10/16/2024   Globin A1c was 8.3 which is a marked improvement.         Hypertension - Primary    Blood pressure noted to be elevated patient has been taking lisinopril 40 mg orally daily, hydralazine 100 mg 3 times a day, hydrochlorothiazide 25 mg orally daily, amlodipine 10 mg orally daily, metoprolol to tartrate 50 mg every 12 hours.  Will need to monitor blood pressure closely.  Like to see her systolics between 120-130.            Digestive    Esophageal reflux    Patient with previous history of reflux symptoms appears to be well-controlled at present.            Endocrine    Hyperparathyroidism (HCC)    Acquired hypothyroidism    Patient with history of acquired hypothyroidism will need TSH monitoring every 6 months.  Will order TSH            Genitourinary    Chronic renal insufficiency, stage 3 (moderate) (HCC)    Lab Results   Component Value Date    EGFR 10 06/05/2023    EGFR 54 05/15/2023    EGFR 43 04/17/2023    CREATININE 1.30 06/05/2023    CREATININE 1.39 04/17/2023    CREATININE 1.31 04/13/2023   Patient with chronic renal insufficiency stage IIIa current GFR at 48.            Behavioral Health    Depression, recurrent (HCC)    Patient appears to be in good spirits at present.            Blood    Anemia    Patient noted to have anemia on iron replacement.  Will need to also check a ferritin level to see her iron stores.            Eye     Primary open angle glaucoma (POAG) of left eye, mild stage    Patient noted to have history of glaucoma in her left eye currently receiving latanoprost solution 0.005% at bedtime            Orthopedic/Musculoskeletal    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)    Patient initially was walking on stumps at present she is confined to bed and a wheelchair.  She will be getting her electric wheelchair soon.            Neurology/Sleep    History of CVA (cerebrovascular accident)     previous CVA with right-sided weakness.            Other    Familial combined hyperlipidemia    Patient currently on atorvastatin 40 mg orally daily         Polypharmacy    Patient noted to have polypharmacy attempts are being made to streamline her medications.        Recommendations    1.-Ferritin level    2.-TSH level    All medications and routine orders were reviewed and updated as needed.    Plan discussed with: Patient    Chief Complaint     No chief complaint on file.  Diabetic [id number] dysfunction    History of Present Illness     Patient is a 64-year-old  female who has been residing at [facility] for the past 2 years.  She apparently had peripheral arterial disease secondary to her underlying diabetes above the knee amputations suffered after her operation with weakness on her right side.  Initially they did have stumps and she tells me she was able to ambulate however physical therapy no longer was able to continue with their services.  Patient has been confined to bed and her wheelchair.  She is getting an electric wheelchair soon and she was very happy about that perspective.  Patient cognitively appears to be intact she was alert and oriented to situation and was able to give me an excellent history.  Among the patient's other concerns is a history of reflux, recurrent depression that appears to be much improved she is on sertraline 100 mg at bedtime, she does have history of  hypertension and currently is on hydralazine 100 mg 3 times daily, hydrochlorothiazide 25 mg daily, amlodipine 10 mg daily, lisinopril 40 mg daily and metoprolol tartrate 50 mg twice a day appears to be doing well.  She also has history of hyperlipidemia and diabetes mellitus type 2 her hemoglobin A1c has improved from 9.8-8.3.  Patient is monitoring her diet and does follow-up with endocrine she has been on Lantus insulin 50 units daily and really what has helped this increasing the Trulicity to 4.5 mg subcu on a weekly basis.  Patient does have a history of hyperparathyroidism, peripheral arterial disease, anemia which was noted to be slightly iron deficient.  Patient has been on iron supplementation and she will also need a ferritin to evaluate her iron stores.  Patient with previous history of right CVA on November 14, 2022 has been taking aspirin and a statin and apparently has been taken off her Plavix back on March 2023.  Patient noted to have an EKG is a prolonged QT interval had been on quetiapine and this medication has been discontinued will try to avoid antipsychotics on this patient given her cardiac history.  Patient did form a acute cardiac arrest back on January 18, 2023 which was felt to be secondary to electrolyte abnormalities and sepsis she appears to be stable her cardiac regimen includes aspirin, Eliquis, amiodarone, atorvastatin and a beta-blocker.  She does follow-up with cardiology on a regular basis.  Patient is under excellent management here at the facility they have recently screened her for breast cancer she was noted to have her last colonoscopy back in 2020 she should follow-up with GI given her history of multiple colonic polyps in the past.  Her issue with polypharmacy is being addressed and medications are being titrated down accordingly.  She does have a daughter who comes in on a regular basis.  I noticed that she did have some sweets on her table however she tells me that she is  given them to the staff.  I suspect this is true given the fact that her hemoglobin A1c has improved.  Has history of glaucoma and right eye and peripheral neuropathy.          HISTORY:  Past Surgical History:   Procedure Laterality Date    AMPUTATION ABOVE KNEE (AKA) Right 12/1/2022    Procedure: (AKA), PSB  BKA;  Surgeon: Johnathan Britton DO;  Location: [facility];  Service: Vascular    ARTERIOGRAM Right 11/7/2022    Procedure: -Right lower extremity angiogram -Right CFA balloon angioplasty with [id number]  [employer]  -Right CFA DCB with 6x40 Bard Lutonix -Right CFA atherectomy with Jetstream and distal embolization protection with 7mm Spider FX -Right SFA/Pop angioplasty with 4x40 Chololate balloon -Right SFA/Pop DCB with 5x150 Bard Lutonix -Right SFA stent with 6x80 [employer] Rosalinda x2 -R    COLONOSCOPY      IR AORTAGRAM WITH RUN-OFF  10/31/2022    IR LOWER EXTREMITY ANGIOGRAM  11/10/2022    IR LOWER EXTREMITY ANGIOGRAM  11/7/2022    IL AMP LEG THRU TIBIA&FIBULA SEC CLOSURE/SCAR REV Right 1/27/2023    Procedure: AMPUTATION REVISION STUMP;  Surgeon: Johnathan Britton DO;  [facility];  Service: Vascular    IL AMPUTATION THIGH THROUGH FEMUR ANY LEVEL Left 11/23/2022    Procedure: (AKA);  Surgeon: Silvano Thompson DO;  [facility];  Service: Vascular    IL NEGATIVE PRESSURE WOUND THERAPY DME <= 50 SQ CM Bilateral 12/19/2022    Procedure: Right above knee amputation washout; vac change; Left above knee amputation debridement; vac placement;  Surgeon: Johnathan Britton DO;  [facility];  Service: Vascular    WOUND DEBRIDEMENT Right 12/16/2022    Procedure: AKA STUMP WASHOUT, Left AKA Staple removal. application of wound vac to Right AKA;  Surgeon: Wan Silva MD;  [facility];  Service: Vascular      Past Medical History:   Diagnosis Date    Altered mental status 1/24/2023    Anxiety     Depression     Diabetes (HCC)     Diabetes mellitus (HCC)      Diarrhea 2022    Epigastric pain 2022    Esophageal reflux     2015 RESOLVED    Fall 2023    Forehead laceration 2023    Hyperlipidemia     Hypertension     Low back pain     sciatica    Pneumonia 2019    Stroke (HCC) 2015    small vessel, L frontal corona radiata. Rx asa 81, Lipitor 40, risk modification    Vitamin D deficiency      Family History   Problem Relation Age of Onset    Diabetes Mother     Lung cancer Mother     Cancer Father     Lung cancer Father     Hypertension Brother     Hypertension Family      Social History     Socioeconomic History    Marital status: Single     Spouse name: None    Number of children: None    Years of education: None    Highest education level: None   Occupational History    None   Tobacco Use    Smoking status: Former     Current packs/day: 0.00     Types: Cigarettes     Quit date:      Years since quittin.1    Smokeless tobacco: Never   Vaping Use    Vaping status: Never Used   Substance and Sexual Activity    Alcohol use: Never     Comment: OCCASIONAL ALCOHOL USE IN ALL SCRIPTS    Drug use: No    Sexual activity: None   Other Topics Concern    None   Social History Narrative    None     Social Drivers of Health     Financial Resource Strain: Not on file   Food Insecurity: No Food Insecurity (2023)    Hunger Vital Sign     Worried About Running Out of Food in the Last Year: Never true     Ran Out of Food in the Last Year: Never true   Transportation Needs: Not on file   Physical Activity: Not on file   Stress: Not on file   Social Connections: Not on file   Intimate Partner Violence: Not on file   Housing Stability: Low Risk  (2023)    Housing Stability Vital Sign     Unable to Pay for Housing in the Last Year: No     Number of Places Lived in the Last Year: 1     Unstable Housing in the Last Year: No       Allergies:  Allergies   Allergen Reactions    Heparin Other (See Comments)     History of HIT       Review of Systems      Review of Systems   Constitutional:  Positive for fever.   HENT: Negative.     Respiratory: Negative.     Endocrine: Negative.    Genitourinary: Negative.    Musculoskeletal:  Positive for gait problem.   Skin: Negative.    Allergic/Immunologic: Negative.    Neurological:  Negative for dizziness.   Hematological: Negative.    Psychiatric/Behavioral: Negative.         PHQ-2/9 Depression Screening             Medications and orders       Current Outpatient Medications:     acetaminophen (TYLENOL) 325 mg tablet, Take 650 mg by mouth every 4 (four) hours as needed for mild pain, Disp: , Rfl:     acetaminophen (TYLENOL) 650 mg suppository, Insert 650 mg into the rectum every 4 (four) hours as needed for mild pain, Disp: , Rfl:     amiodarone 100 mg tablet, Take 100 mg by mouth daily, Disp: , Rfl:     amLODIPine (NORVASC) 10 mg tablet, TAKE 1 TABLET BY MOUTH EVERY DAY, Disp: 90 tablet, Rfl: 1    apixaban (ELIQUIS) 5 mg, Take 5 mg by mouth 2 (two) times a day, Disp: , Rfl:     aspirin 81 mg chewable tablet, Chew 81 mg daily, Disp: , Rfl:     atorvastatin (LIPITOR) 40 mg tablet, TAKE 1 TABLET BY MOUTH EVERY DAY, Disp: 90 tablet, Rfl: 1    calcium carbonate (TUMS) 500 mg chewable tablet, Chew 2 tablets every 6 (six) hours as needed for indigestion or heartburn, Disp: , Rfl:     Continuous Blood Gluc  (FreeStyle Noé 14 Day [place]) LITO, Use 1 Units continuous, Disp: 1 each, Rfl: 0    Continuous Blood Gluc Sensor (FreeStyle Noé 14 Day Sensor) MISC, Use daily as directed for CGM - Change every 14 days, Disp: 2 each, Rfl: 1    dulaglutide (Trulicity) 4.5 MG/0.5ML injection, Inject 0.5 mL (4.5 mg total) under the skin every 7 days - Start after completing 4 weeks of dulaglutide 3 mg weekly for 4 weeks. If tolerating, start dulaglutide 4.5 mg weekly., Disp: 2 mL, Rfl: 4    ferrous sulfate 325 (65 Fe) mg tablet, Take 325 mg by mouth daily with breakfast, Disp: , Rfl:     gabapentin (NEURONTIN) 100 mg capsule, Take  100 mg by mouth daily at bedtime, Disp: , Rfl:     hydrALAZINE (APRESOLINE) 100 MG tablet, Take 1 tablet (100 mg total) by mouth every 8 (eight) hours, Disp: , Rfl: 0    hydrochlorothiazide (HYDRODIURIL) 12.5 mg tablet, Take 1 tablet (12.5 mg total) by mouth daily Do not start before February 4, 2023., Disp: , Rfl: 0    insulin glargine (LANTUS) 100 units/mL subcutaneous injection, Inject 46 Units under the skin every morning. Dose change, Disp: 10 mL, Rfl: 4    Insulin Pen Needle (B-D UF III MINI PEN NEEDLES) 31G X 5 MM MISC, USE WITH INSULIN FOUR TIMES DAILY, Disp: 200 each, Rfl: 3    Lancets 28G MISC, Use 1 each 4 (four) times a day, Disp: 400 each, Rfl: 0    levothyroxine 50 mcg tablet, Take 50 mcg by mouth daily, Disp: , Rfl:     lisinopril (ZESTRIL) 40 mg tablet, TAKE 1 TABLET BY MOUTH EVERY DAY, Disp: 90 tablet, Rfl: 1    metoprolol tartrate (LOPRESSOR) 50 mg tablet, Take 1 tablet (50 mg total) by mouth every 12 (twelve) hours, Disp: , Rfl: 0    Multiple Vitamin (multivitamin) tablet, Take 1 tablet by mouth daily, Disp: , Rfl:     pantoprazole (PROTONIX) 20 mg tablet, Take 20 mg by mouth daily, Disp: , Rfl:     polyethylene glycol (MIRALAX) 17 g packet, Take 17 g by mouth daily as needed for constipation, Disp: , Rfl:     Psyllium 25 % PACK, Take 1 packet by mouth daily, Disp: , Rfl:     saccharomyces boulardii (FLORASTOR) 250 mg capsule, Take 250 mg by mouth 2 (two) times a day, Disp: , Rfl:     sertraline (ZOLOFT) 100 mg tablet, TAKE 1 TABLET BY MOUTH EVERY DAY, Disp: 90 tablet, Rfl: 1    thiamine (VITAMIN B1) 100 mg tablet, Take 1 tablet (100 mg total) by mouth daily Do not start before February 4, 2023., Disp: , Rfl: 0    travoprost (TRAVATAN-Z) 0.004 % ophthalmic solution, Administer 1 drop into the left eye daily at bedtime, Disp: , Rfl:        Objective     Vitals:   Vitals:    02/13/25 1306   BP: 152/67   Pulse: 73   Resp: 20   Temp: (!) 96.8 °F (36 °C)   SpO2: 96%   Weight: 90.5 kg (199 lb 8 oz)        Physical Exam  Constitutional:       Appearance: She is obese.   HENT:      Head: Normocephalic and atraumatic.      Right Ear: External ear normal.      Left Ear: External ear normal.      Nose: Nose normal.      Mouth/Throat:      Mouth: Mucous membranes are dry.   Eyes:      Conjunctiva/sclera: Conjunctivae normal.      Pupils: Pupils are equal, round, and reactive to light.   Cardiovascular:      Rate and Rhythm: Normal rate and regular rhythm.      Heart sounds: Normal heart sounds.   Pulmonary:      Breath sounds: Normal breath sounds.   Abdominal:      General: Bowel sounds are normal.      Palpations: Abdomen is soft.   Musculoskeletal:      Cervical back: Neck supple.      Comments: Patient with bilateral above-the-knee amputations   Skin:     General: Skin is dry.   Neurological:      Mental Status: She is oriented to person, place, and time. Mental status is at baseline.   Psychiatric:         Mood and Affect: Mood normal.         Behavior: Behavior normal.         Pertinent Laboratory/Diagnostic Studies:   The following labs/studies were reviewed please see facility chart for details.

## 2025-02-13 NOTE — ASSESSMENT & PLAN NOTE
Patient initially was walking on stumps at present she is confined to bed and a wheelchair.  She will be getting her electric wheelchair soon.

## 2025-02-13 NOTE — ASSESSMENT & PLAN NOTE
Patient with history of acquired hypothyroidism will need TSH monitoring every 6 months.  Will order TSH

## 2025-02-13 NOTE — ASSESSMENT & PLAN NOTE
Lab Results   Component Value Date    EGFR 10 06/05/2023    EGFR 54 05/15/2023    EGFR 43 04/17/2023    CREATININE 1.30 06/05/2023    CREATININE 1.39 04/17/2023    CREATININE 1.31 04/13/2023   Patient with chronic renal insufficiency stage IIIa current GFR at 48.

## 2025-02-13 NOTE — ASSESSMENT & PLAN NOTE
Patient noted to have anemia on iron replacement.  Will need to also check a ferritin level to see her iron stores.

## 2025-02-14 ENCOUNTER — TELEPHONE (OUTPATIENT)
Dept: HEMATOLOGY ONCOLOGY | Facility: CLINIC | Age: 65
End: 2025-02-14

## 2025-02-14 NOTE — TELEPHONE ENCOUNTER
External labs from 1/28/225 obtained from right fax folder and uploaded into patient chart for further review.   Consent: The patient's consent was obtained including but not limited to risks of crusting, scabbing, blistering, scarring, darker or lighter pigmentary change, recurrence, incomplete removal and infection. Treatment Number (Will Not Render If 0): 0 Post-Care Instructions: I reviewed with the patient in detail post-care instructions. Patient is to wear sunprotection, and avoid picking at any of the treated lesions. Pt may apply Vaseline to crusted or scabbing areas. Medical Necessity Clause: This procedure was medically necessary because the lesions that were treated were: Add 52 Modifier (Optional): no Medical Necessity Information: It is in your best interest to select a reason for this procedure from the list below. All of these items fulfill various CMS LCD requirements except the new and changing color options. Detail Level: Detailed Anesthesia Volume In Cc: 0.5

## 2025-03-18 ENCOUNTER — NURSING HOME VISIT (OUTPATIENT)
Dept: GERIATRICS | Facility: OTHER | Age: 65
End: 2025-03-18
Payer: COMMERCIAL

## 2025-03-18 DIAGNOSIS — E21.3 HYPERPARATHYROIDISM (HCC): ICD-10-CM

## 2025-03-18 DIAGNOSIS — N18.30 CHRONIC RENAL INSUFFICIENCY, STAGE 3 (MODERATE) (HCC): ICD-10-CM

## 2025-03-18 DIAGNOSIS — D50.8 IRON DEFICIENCY ANEMIA SECONDARY TO INADEQUATE DIETARY IRON INTAKE: ICD-10-CM

## 2025-03-18 DIAGNOSIS — E03.9 ACQUIRED HYPOTHYROIDISM: ICD-10-CM

## 2025-03-18 DIAGNOSIS — Z86.74 HISTORY OF CARDIAC ARREST: ICD-10-CM

## 2025-03-18 DIAGNOSIS — Z89.611 S/P BILATERAL ABOVE KNEE AMPUTATION (HCC): ICD-10-CM

## 2025-03-18 DIAGNOSIS — G54.6 PHANTOM PAIN AFTER AMPUTATION OF LOWER EXTREMITY (HCC): ICD-10-CM

## 2025-03-18 DIAGNOSIS — R94.31 PROLONGED Q-T INTERVAL ON ECG: ICD-10-CM

## 2025-03-18 DIAGNOSIS — I10 PRIMARY HYPERTENSION: ICD-10-CM

## 2025-03-18 DIAGNOSIS — D72.823 LEUKEMOID REACTION: Primary | ICD-10-CM

## 2025-03-18 DIAGNOSIS — F32.A ANXIETY AND DEPRESSION: ICD-10-CM

## 2025-03-18 DIAGNOSIS — K21.9 GASTROESOPHAGEAL REFLUX DISEASE, UNSPECIFIED WHETHER ESOPHAGITIS PRESENT: ICD-10-CM

## 2025-03-18 DIAGNOSIS — Z86.73 HISTORY OF CVA (CEREBROVASCULAR ACCIDENT): ICD-10-CM

## 2025-03-18 DIAGNOSIS — Z89.612 S/P BILATERAL ABOVE KNEE AMPUTATION (HCC): ICD-10-CM

## 2025-03-18 DIAGNOSIS — F41.9 ANXIETY AND DEPRESSION: ICD-10-CM

## 2025-03-18 DIAGNOSIS — S78.119D: ICD-10-CM

## 2025-03-18 DIAGNOSIS — E78.49 FAMILIAL COMBINED HYPERLIPIDEMIA: ICD-10-CM

## 2025-03-18 DIAGNOSIS — I73.9 PAD (PERIPHERAL ARTERY DISEASE) (HCC): Chronic | ICD-10-CM

## 2025-03-18 DIAGNOSIS — Z79.4 TYPE 2 DIABETES MELLITUS WITH HYPERGLYCEMIA, WITH LONG-TERM CURRENT USE OF INSULIN (HCC): ICD-10-CM

## 2025-03-18 DIAGNOSIS — E11.65 TYPE 2 DIABETES MELLITUS WITH HYPERGLYCEMIA, WITH LONG-TERM CURRENT USE OF INSULIN (HCC): ICD-10-CM

## 2025-03-18 PROBLEM — E87.70 GENERALIZED EDEMA DUE TO FLUID OVERLOAD: Status: RESOLVED | Noted: 2022-10-27 | Resolved: 2025-03-18

## 2025-03-18 PROBLEM — R19.7 DIARRHEA, UNSPECIFIED: Status: RESOLVED | Noted: 2022-11-14 | Resolved: 2025-03-18

## 2025-03-18 PROBLEM — R74.01 TRANSAMINITIS: Status: RESOLVED | Noted: 2020-01-31 | Resolved: 2025-03-18

## 2025-03-18 PROBLEM — S81.809A WOUND OF LOWER EXTREMITY: Status: RESOLVED | Noted: 2022-10-21 | Resolved: 2025-03-18

## 2025-03-18 PROBLEM — R09.89 CHEST CONGESTION: Status: RESOLVED | Noted: 2024-04-16 | Resolved: 2025-03-18

## 2025-03-18 PROBLEM — R60.1 GENERALIZED EDEMA DUE TO FLUID OVERLOAD: Status: RESOLVED | Noted: 2022-10-27 | Resolved: 2025-03-18

## 2025-03-18 PROBLEM — D75.839 THROMBOCYTOSIS: Status: RESOLVED | Noted: 2023-02-06 | Resolved: 2025-03-18

## 2025-03-18 PROBLEM — S91.302A NON HEALING LEFT HEEL WOUND: Status: RESOLVED | Noted: 2022-10-22 | Resolved: 2025-03-18

## 2025-03-18 PROCEDURE — 99309 SBSQ NF CARE MODERATE MDM 30: CPT | Performed by: STUDENT IN AN ORGANIZED HEALTH CARE EDUCATION/TRAINING PROGRAM

## 2025-03-18 NOTE — ASSESSMENT & PLAN NOTE
Lab Results   Component Value Date    HGBA1C 9.4 10/16/2024     Recent A1c 8.3 improved  Encourage CCD  Monitor glucose routinely - recent fasting sugars have generally been mid 100s - low 200s, not yet well controlled  Avoid hypoglycemia  Encourage lifestyle modifications including healthy diet, weight management, exercise as appropriate  Following Endocrine outpatient. Follow up Endocrine/Ophthalmology as appropriate  Continue Lantus, will increase from 50u to 55u daily, titrate further as appropriate.  Trulicity recently increased to 4.5mg subQ weekly  Follow up HgbA1c Q3 months July/Oct/Jan/April

## 2025-03-18 NOTE — ASSESSMENT & PLAN NOTE
Lab Results   Component Value Date    EGFR 10 06/05/2023    EGFR 54 05/15/2023    EGFR 43 04/17/2023    CREATININE 1.30 06/05/2023    CREATININE 1.39 04/17/2023    CREATININE 1.31 04/13/2023       Baseline GFR seems consistent with CKD3a  Monitor renal function on routine labs - stable  Avoid nephrotoxins, NSAIDs as able  Encourage PO hydration, respecting volume status

## 2025-03-18 NOTE — ASSESSMENT & PLAN NOTE
mood stable and well controlled overall. She endorses feeling well and happy. Mood tends to be down over winter/holidays but well controlled at this time  Psych following at facility  Supportive care  Continue sertraline 100 mg po daily at bedtime  Has alprazolam 0.25mg daily PRN as well  (Was on quetiapine, has been weaned and discontinued as of April 2024 and appears tolerating well without it)  (Was on bupropion, has been discontinued as of Jan 2025 and appears stable without it)

## 2025-03-18 NOTE — ASSESSMENT & PLAN NOTE
>>ASSESSMENT AND PLAN FOR TYPE 2 DIABETES MELLITUS WITH HYPERGLYCEMIA, WITH LONG-TERM CURRENT USE OF INSULIN (HCC) WRITTEN ON 3/18/2025  6:33 PM BY SAMANTA ELLIOTT,       Lab Results   Component Value Date    HGBA1C 9.4 10/16/2024     Recent A1c 8.3 improved  Encourage CCD  Monitor glucose routinely - recent fasting sugars have generally been mid 100s - low 200s, not yet well controlled  Avoid hypoglycemia  Encourage lifestyle modifications including healthy diet, weight management, exercise as appropriate  Following Endocrine outpatient. Follow up Endocrine/Ophthalmology as appropriate  Continue Lantus, will increase from 50u to 55u daily, titrate further as appropriate.  Trulicity recently increased to 4.5mg subQ weekly  Follow up HgbA1c Q3 months July/Oct/Jan/April

## 2025-03-18 NOTE — ASSESSMENT & PLAN NOTE
Monitor BP - generally stable, somewhat variable, around 120s-140s systolic, rarely lower or higher  Pulse generally 60s-80s  Avoid hypotension  No acute cardiac complaints  Continue regimen including amlodipine 10mg daily, lisinopril 40mg daily, metoprolol tartrate 50mg BID, HCTZ 25mg daily, hydralazine 100mg TID, amiodarone 100mg daily  Consider weaning/adjusting extensive regimen as appropriate, especially if BP remains well controlled or becomes softer. For now seems stable on current regimen  Following Cardiology outpatient

## 2025-03-18 NOTE — ASSESSMENT & PLAN NOTE
Last TSH elevated from Feb 2024  Continue levothyroxine - will increase from 50mcg to 62.5mcg daily  No apparent acute/associated symptoms   Monitor TSH periodically; Adjust dosing as appropriate  next TSH in April 2025  Patient is following Endocrine outpatient

## 2025-03-18 NOTE — ASSESSMENT & PLAN NOTE
Phantom limb pains L>R lower extremity, intermittent, at random times but generally worse overnight  Gabapentin recently titrated to 200mg BID, she thinks this has been very helpful, the phantom pains are much improved and no longer bothering her lately, she feels stable with current dosing  Continue to monitor - much improved/possibly resolved as no recent concerns of phantom pain. If remaining stable would consider weaning gabapentin dose next visit.

## 2025-03-18 NOTE — ASSESSMENT & PLAN NOTE
S/p left AKA on 11/23/22  S/p right AKA stump revision on 01/27/23  Continue aspirin, statin, Eliquis  Working with rehab regarding prosthetics and power wheelchair, PT/OT as appropriate  Follow up with Vascular as appropriate

## 2025-03-18 NOTE — ASSESSMENT & PLAN NOTE
Patient with persistent mildly elevated WBC/neutrophilia since around June 2024  Seems to have coincided with likely viral URI and associated diarrhea around that time however has not improved  Monitor on routine labs - seems fairly mild but persistent as of Dec 2024  Monitor for acute infectious symptoms - none presently  Patient denies any unintentional weight loss, fevers, night sweats, chills, or other overt signs of systemic or malignant pathology.  Has established with Heme/Onc with workup pending

## 2025-03-18 NOTE — ASSESSMENT & PLAN NOTE
-stable per patient  -continue PRN calcium carbonate. Pantoprazole recently discontinued and she seems stable without this.  -recommend OOB with meals, sit upright for at least 30 minutes afterwards, avoid trigger foods  -continue to monitor  -follow up with GI as appropriate/needed

## 2025-03-18 NOTE — PROGRESS NOTES
[facility]  Facility: [facility]    PROGRESS NOTE  Nursing Home Place of Service: nursing home place of service: POS 32 Unskilled- No Part A Coverage    NAME: Dianna Varghese  : 1960 AGE: 64 y.o. SEX: female MRN: [id number]  DATE OF ENCOUNTER: 3/18/2025    Assessment and Plan     Problem List Items Addressed This Visit       Esophageal reflux    -stable per patient  -continue PRN calcium carbonate. Pantoprazole recently discontinued and she seems stable without this.  -recommend OOB with meals, sit upright for at least 30 minutes afterwards, avoid trigger foods  -continue to monitor  -follow up with GI as appropriate/needed         Anxiety and depression    mood stable and well controlled overall. She endorses feeling well and happy. Mood tends to be down over winter/holidays but well controlled at this time  Psych following at facility  Supportive care  Continue sertraline 100 mg po daily at bedtime  Has alprazolam 0.25mg daily PRN as well  (Was on quetiapine, has been weaned and discontinued as of 2024 and appears tolerating well without it)  (Was on bupropion, has been discontinued as of 2025 and appears stable without it)         Hypertension    Monitor BP - generally stable, somewhat variable, around 120s-140s systolic, rarely lower or higher  Pulse generally 60s-80s  Avoid hypotension  No acute cardiac complaints  Continue regimen including amlodipine 10mg daily, lisinopril 40mg daily, metoprolol tartrate 50mg BID, HCTZ 25mg daily, hydralazine 100mg TID, amiodarone 100mg daily  Consider weaning/adjusting extensive regimen as appropriate, especially if BP remains well controlled or becomes softer. For now seems stable on current regimen  Following Cardiology outpatient         Familial combined hyperlipidemia    Continue statin          Type 2 diabetes mellitus with hyperglycemia, with long-term current use of insulin (HCC)      Lab Results   Component Value Date     HGBA1C 9.4 10/16/2024     Recent A1c 8.3 improved  Encourage CCD  Monitor glucose routinely - recent fasting sugars have generally been mid 100s - low 200s, not yet well controlled  Avoid hypoglycemia  Encourage lifestyle modifications including healthy diet, weight management, exercise as appropriate  Following Endocrine outpatient. Follow up Endocrine/Ophthalmology as appropriate  Continue Lantus, will increase from 50u to 55u daily, titrate further as appropriate.  Trulicity recently increased to 4.5mg subQ weekly  Follow up HgbA1c Q3 months July/Oct/Jan/April         Hyperparathyroidism (HCC)    Seems to be primary on chart review  Continue multivitamin/vit D  Monitor calcium on routine labs - has been stable/WNL recently  Following Endocrine outpatient         PAD (peripheral artery disease) (HCC) (Chronic)    S/p left AKA on 11/23/22  S/p right AKA stump revision on 01/27/23  Continue aspirin, statin, Eliquis  Working with rehab regarding prosthetics and power wheelchair, PT/OT as appropriate  Follow up with Vascular as appropriate         Anemia    Seems fairly mild, stable on lab review  -monitor on routine labs  -borderline microcytic and hx of low iron on lab review - continue ferrous sulfate PO qOD  -likely component of CKD as well  -monitor for acute bleed - no present signs  -consider further workup, Heme consult if persistent/worsening  -transfuse PRN Hb <7  -low threshold to hold anticoagulation if evidence of bleed or worsening Hb         History of CVA (cerebrovascular accident)    Pt had right CVA 11/14/2022  Continue aspirin, statin  Pt has been off plavix since 03/2023 after vascular visit         Prolonged Q-T interval on ECG    Noted on prior EKGs  No apparent acute/associated symptoms  Avoid QT prolonging medications as able  Following with Cardiology         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)    Bilateral AKA as above         S/P bilateral above knee amputation (HCC)     Working with PT/OT  Working with prosthetics  Working with power wheelchair training         Acquired hypothyroidism    Last TSH elevated from Feb 2024  Continue levothyroxine - will increase from 50mcg to 62.5mcg daily  No apparent acute/associated symptoms   Monitor TSH periodically; Adjust dosing as appropriate  next TSH in April 2025  Patient is following Endocrine outpatient         History of cardiac arrest    S/p cardiac arrest on 1/18/2023 considered due to electrolyte abnormalities/severe sepsis  No recent/acute cardiac complaints  Continue cardiac regimen including aspirin, Eliquis, amiodarone, atorvastatin, beta blocker  Follow up with Cardiology as appropriate/yearly; had recent Cardio visit 4/8/24 and considered stable         Leukemoid reaction - Primary    Patient with persistent mildly elevated WBC/neutrophilia since around June 2024  Seems to have coincided with likely viral URI and associated diarrhea around that time however has not improved  Monitor on routine labs - seems fairly mild but persistent as of Dec 2024  Monitor for acute infectious symptoms - none presently  Patient denies any unintentional weight loss, fevers, night sweats, chills, or other overt signs of systemic or malignant pathology.  Has established with Heme/Onc with workup pending         Phantom pain after amputation of lower extremity (HCC)    Phantom limb pains L>R lower extremity, intermittent, at random times but generally worse overnight  Gabapentin recently titrated to 200mg BID, she thinks this has been very helpful, the phantom pains are much improved and no longer bothering her lately, she feels stable with current dosing  Continue to monitor - much improved/possibly resolved as no recent concerns of phantom pain. If remaining stable would consider weaning gabapentin dose next visit.         Chronic renal insufficiency, stage 3 (moderate) (HCC)    Lab Results   Component Value Date    EGFR 10 06/05/2023    EGFR 54  05/15/2023    EGFR 43 04/17/2023    CREATININE 1.30 06/05/2023    CREATININE 1.39 04/17/2023    CREATININE 1.31 04/13/2023       Baseline GFR seems consistent with CKD3a  Monitor renal function on routine labs - stable  Avoid nephrotoxins, NSAIDs as able  Encourage PO hydration, respecting volume status                                Chief Complaint     Follow up 30 day    History of Present Illness     Dianna Varghese is a 64 y.o. female who was seen today for follow up. PMH including bilateral above knee amputations, cardiac arrest, type 2 DM, TIA, major depressive disorder, peripheral vascular disease, hypertension, hyperlipidemia, and GERD       Patient seen and examined in room  Others present: OT  Patient sitting up in bed. Has power wheelchair nearby in room.  Appears comfortable, awake, alert, oriented to situation, able to converse appropriately  Very polite, Aox3, in very good spirits, appears to be a reasonable historian, mentation appearing similar to my prior visit. She notes she feels very well lately, no new/acute concerns, very pleased she has gotten her power wheelchair and has been training in using it with therapy though not yet cleared for independent driving. Reports good stable appetite, no swallowing trouble, no abd pain/N/V, recently BM regular/normal/easy. Urinating well without acute issue. She generally requires andree lift for transfer and utilizes wheelchair, able to self-propel, aiming to use power wheelchair independently. Breathing fine on room air, no orthopnea. No CP/palpitations including on exertion; does have some chronic stable dyspnea on exertion which is unchanged. Denies orthostatic lightheadedness. Sleeping well. No acute pain anywhere; she has previously had intermittent phantom limb pains L>R, generally worse overnight, for which gabapentin has been very helpful and the issue is much improved/resolved compared to previously.  She does not feel acutely feverish or confused.  "No acute cardiopulmonary, abdominal, or urinary symptoms; see ROS for more details.      No further questions or acute concerns identified.  No further acute concerns per nursing.     Lab Review:   1/18/24: CBC with Hb 10.8; CMP with Cr 1.43, eGFR 41 (recent baseline seems around 40s-60s); TSH 8.20, FT4 0.6; A1c 7.7  3/6/24: CBC with Hb 10.8; BMP generally stable, Cr 1.41, eGFR 42; TSH 7.96  4/18/24: CBC with Hb 10.9; CMP with Cr 1.51, eGFR 39; A1c 9.3; TSH WNL  6/3/24: CBC with WBC 11.8, Hb 10.5  6/10/24: CBC with WBC 12.3, Hb 10.5  6/17/24: CBC with WBC 12.1, Hb 10.5  6/26/24: CBC with WBC 10.7, Hb 10.6; BMP generally stable/non-actionable, Cr 1.08, GFR 58; Mg 1.7  7/17/24: CBC with WBC 11.8, Hb 11.0; CMP generally stable/non-actionable, Cr 1.43, eGFR 41; A1c 9.7  8/14/24: BMP with Cr 1.21, GFR 50  8/28/24: CBC with WBC 11.9, Hb 11.3  10/16/24: CBC with WBC 11.2, Hb 11.6; CMP generally stable/non-actionable, Cr 0.99, eGFR 64; A1c 9.4  10/21/24: CBC with WBC 12.0, Hb 11.2, manual diff with elevated neutrophils  12/12/24: CBC with WBC 11.8, Hb 10.9  1/9/25: respiratory viral panel negative  1/15/25: CBC with WBC 13.6, Hb 10.7; CMP with Cr 1.25, GFR 48; A1c 8.3  1/28/25: CBC with WBC 10.7, Hb 10.8; CMP with Cr 1.21, GFR 50; ESR 46; CRP 19.7; iron 46; flow cytometry \"There is no immunophenotypic evidence of a clonal B or T-cell population or increase in CD34+ blasts.\"  2/14-19/25: TSH 11.55, fT4 WNL          EKG Jan 2023: NSR, 91bpm, Qtc 511  LEON Jan 2023: EF 60, \"No vegetations or other evidence of endocarditis seen \"       The following portions of the patient's history were reviewed and updated as appropriate: allergies, current medications, past family history, past medical history, past social history, past surgical history and problem list.    Review of Systems     Review of Systems   Constitutional:  Negative for appetite change, chills, diaphoresis and fever.   HENT:  Negative for congestion, drooling, ear " pain, hearing loss, rhinorrhea, sore throat and trouble swallowing.    Eyes:  Negative for pain, discharge, redness, itching and visual disturbance.   Respiratory:  Negative for cough, chest tightness, shortness of breath and wheezing.    Cardiovascular:  Negative for chest pain, palpitations and leg swelling.   Gastrointestinal:  Negative for abdominal pain, blood in stool, constipation, diarrhea, nausea and vomiting.   Genitourinary:  Negative for difficulty urinating, dysuria, flank pain and hematuria.   Musculoskeletal:  Positive for gait problem. Negative for arthralgias, back pain and neck pain.   Skin:  Negative for color change.   Neurological:  Negative for dizziness, facial asymmetry, speech difficulty, weakness, light-headedness and headaches.   Psychiatric/Behavioral:  Negative for agitation, behavioral problems and confusion. The patient is not nervous/anxious and is not hyperactive.    All other systems reviewed and are negative.      Active Problem List     Patient Active Problem List   Diagnosis    Esophageal reflux    DM (diabetes mellitus) type II, controlled, with peripheral vascular disorder ([facility])    Class 3 severe obesity due to excess calories with serious comorbidity and body mass index (BMI) of 40.0 to 44.9 in adult ([facility])    Depression, recurrent ([facility])    Anxiety and depression    Hypertension    Diabetic peripheral neuropathy ([facility])    Vitamin D deficiency    Systolic murmur    Familial combined hyperlipidemia    Arthritis    Moderate protein-calorie malnutrition ([facility])    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 ([facility])    Type 2 diabetes mellitus with hyperglycemia, with long-term current use of insulin ([facility])    Hyperparathyroidism ([facility])    Hypokalemia    PAD (peripheral artery disease) ([facility])    Anemia    Diabetic ulcer of heel associated with diabetes mellitus due to underlying condition ([facility])     HIT (heparin-induced thrombocytopenia) (HCC)    Mild protein-calorie malnutrition (HCC)    History of CVA (cerebrovascular accident)    Fall    Troponin level elevated    Hypomagnesemia    Prolonged Q-T interval on ECG    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)    Weight gain    S/P bilateral above knee amputation (HCC)    Acquired hypothyroidism    History of cardiac arrest    Advance care planning    Leukemoid reaction    Phantom pain after amputation of lower extremity (HCC)    Polypharmacy    Chronic renal insufficiency, stage 3 (moderate) ([facility])    Primary open angle glaucoma (POAG) of left eye, mild stage       Objective     Vital Signs:     BP: 126/74 mmHg  3/12/2025 16:09   Temp:97.3 °F  2/14/2025 09:43 Pulse:70 bpm  3/12/2025 09:39 Weight:202 Lbs  3/9/2025 11:17   Resp:20 Breaths/min  1/9/2025 15:40 BS:243 mg/dL  3/18/2025 11:54 O2:96 %  1/9/2025 15:40 Pain:0  3/18/2025 09:33       Physical Exam  Vitals reviewed.   Constitutional:       General: She is not in acute distress.     Appearance: She is obese. She is not toxic-appearing or diaphoretic.   HENT:      Head: Normocephalic and atraumatic.      Right Ear: External ear normal.      Left Ear: External ear normal.      Nose: Nose normal. No rhinorrhea.      Mouth/Throat:      Mouth: Mucous membranes are dry.      Pharynx: Oropharynx is clear. No posterior oropharyngeal erythema.   Eyes:      General: No scleral icterus.        Right eye: No discharge.         Left eye: No discharge.      Extraocular Movements: Extraocular movements intact.      Conjunctiva/sclera: Conjunctivae normal.      Pupils: Pupils are equal, round, and reactive to light.   Cardiovascular:      Rate and Rhythm: Normal rate and regular rhythm.   Pulmonary:      Effort: Pulmonary effort is normal. No respiratory distress.      Breath sounds: Normal breath sounds. No wheezing or rales.   Abdominal:      General: Bowel sounds are normal.      Palpations: Abdomen is soft.       Tenderness: There is no abdominal tenderness. There is no guarding.   Musculoskeletal:      Cervical back: No rigidity.      Comments: Bilateral AKA, sites appear well healed without bleed, drainage, or erythema   Skin:     General: Skin is warm and dry.      Coloration: Skin is not jaundiced.   Neurological:      General: No focal deficit present.      Mental Status: She is alert and oriented to person, place, and time. Mental status is at baseline.   Psychiatric:         Mood and Affect: Mood normal.         Behavior: Behavior normal.         Thought Content: Thought content normal.         Judgment: Judgment normal.         Pertinent Laboratory/Diagnostic Studies:  Laboratory and Imaging studies reviewed. Full report in the paper chart.     Current Medications   Medications reviewed and updated in facility chart.      -Total time spent on this encounter today including documentation and workup review, face to face time, history and exam, and documentation/orders, was approximately 45 minutes.  -This note will be copied to [facility] EMR where vitals and medication orders are placed.    Seb Chun D.O.  Geriatric Medicine  3/18/2025 6:34 PM

## 2025-03-19 ENCOUNTER — TELEPHONE (OUTPATIENT)
Age: 65
End: 2025-03-19

## 2025-03-19 ENCOUNTER — OFFICE VISIT (OUTPATIENT)
Dept: ENDOCRINOLOGY | Facility: CLINIC | Age: 65
End: 2025-03-19
Payer: COMMERCIAL

## 2025-03-19 VITALS — DIASTOLIC BLOOD PRESSURE: 88 MMHG | OXYGEN SATURATION: 92 % | SYSTOLIC BLOOD PRESSURE: 120 MMHG | HEART RATE: 70 BPM

## 2025-03-19 DIAGNOSIS — E21.3 HYPERPARATHYROIDISM (HCC): ICD-10-CM

## 2025-03-19 DIAGNOSIS — E11.51 DM (DIABETES MELLITUS) TYPE II, CONTROLLED, WITH PERIPHERAL VASCULAR DISORDER (HCC): Primary | ICD-10-CM

## 2025-03-19 DIAGNOSIS — I10 PRIMARY HYPERTENSION: ICD-10-CM

## 2025-03-19 DIAGNOSIS — E03.9 ACQUIRED HYPOTHYROIDISM: ICD-10-CM

## 2025-03-19 LAB — SL AMB POCT HEMOGLOBIN AIC: 9.1 (ref ?–6.5)

## 2025-03-19 PROCEDURE — 99214 OFFICE O/P EST MOD 30 MIN: CPT | Performed by: PHYSICIAN ASSISTANT

## 2025-03-19 PROCEDURE — 83036 HEMOGLOBIN GLYCOSYLATED A1C: CPT | Performed by: PHYSICIAN ASSISTANT

## 2025-03-19 RX ORDER — KETOROLAC TROMETHAMINE 30 MG/ML
1 INJECTION, SOLUTION INTRAMUSCULAR; INTRAVENOUS DAILY
Qty: 1 EACH | Refills: 0 | Status: SHIPPED | OUTPATIENT
Start: 2025-03-19

## 2025-03-19 RX ORDER — LATANOPROST 50 UG/ML
SOLUTION/ DROPS OPHTHALMIC
COMMUNITY
Start: 2025-02-05

## 2025-03-19 RX ORDER — HYDROCHLOROTHIAZIDE 12.5 MG/1
1 CAPSULE ORAL
Qty: 6 EACH | Refills: 2 | Status: SHIPPED | OUTPATIENT
Start: 2025-03-19

## 2025-03-19 NOTE — PATIENT INSTRUCTIONS
Continue your medications with the following changes:  Discontinue Trulicity and replace with Mounjaro, plan to increase to next dose, 5mg weekly if tolerated for one month at starter dose  Contact the office if you develop low sugars while up-titrating Mounjaro  Transition Noé 2 to Noé 3+ if new  is covered by insurance  Monitor blood sugars regularly  Contact the office with any severe hypoglycemic or hyperglycemic events  Maintain appropriate diet and physical activity  Follow up in 3 months  Call with any questions, concerns, or refill requests  Obtain labs prior to your next appointment

## 2025-03-19 NOTE — PROGRESS NOTES
Name: Dianna Varghese      : 1960      MRN: [id number]  Encounter Provider: Day Ivey PA-C  Encounter Date: 3/19/2025   Encounter department: [facility] [place]    Chief Complaint   Patient presents with   • Diabetes Type 2     :  Assessment & Plan  DM (diabetes mellitus) type II, controlled, with peripheral vascular disorder (HCC)    Lab Results   Component Value Date    HGBA1C 9.1 (A) 2025   Current HbA1c represents poor control. Blood sugars demonstrating elevations likely due to dietary indiscretion.  Continue current regimen with the following adjustments:  Noé 3+ ordered for patient  [facility] to [facility] as tolerated  Advised to adhere to diabetic diet, and recommended staying active/exercising routinely.  Discussed symptoms and treatment of hypoglycemia.    Recommended routine follow-up with Ophthalmology and foot exams.   Ordered blood work to complete prior to next visit.    Orders:  •  Comprehensive metabolic panel; Future  •  Hemoglobin A1C; Future  •  POCT hemoglobin A1c  •  Tirzepatide 2.5 MG/0.5ML SOAJ; Inject 2.5 mg under the skin once a week  •  Tirzepatide 5 MG/0.5ML SOAJ; Inject 5 mg under the skin once a week Do not start before 2025.  •  Continuous Glucose Sensor (FreeStyle Noé 3 Plus Sensor) MISC; Use 1 each every 15 (fifteen) days  •  Continuous Glucose  (FreeStyle Noé 3 [place]) LITO; Use 1 Device in the morning    Primary hypertension  BP at goal.   Continue current medication regimen.        Hyperparathyroidism (HCC)  Continue vitamin D3 2000 units daily  Orders:  •  PTH, intact; Future  •  Vitamin D 25 hydroxy; Future    Acquired hypothyroidism  Maintained on levothyroxine 50 mcg daily  Monitor TSH routinely  Orders:  •  TSH, 3rd generation with Free T4 reflex; Future        History of Present Illness {?Quick Links Encounters * My Last Note * Last Note in Specialty * Snapshot  * Since Last Visit * History :23059}    Dianna Varghese is a 64 y.o. female with type 2 diabetes, hyperparathyroidism, hypothyroidism, hypertension seen in follow up. Reports complications of PD, bilateral AKA, neuropathy, CVA. Denies recent severe hypoglycemic or severe hyperglycemic episodes. Denies any issues with her current regimen. Last A1C was 9.1 performed in office today. Denies recent illness, hospitalization or steroid use.     Blood sugars have been 170s - 290s.  She does have a tavon device but did not bring her reader with her today.  She is very eager to try Mounjaro and is frustrated at her persistent sugar elevation.  She reports that the provider at her nursing facility did increase her Lantus due to persistently elevated blood sugars.  She acknowledges that she does enjoy carb heavy snacks.  Her activity is limited due to bilateral AKA.  She denies having any GI complaints related to Trulicity use.    Current regimen:   Lantus 55 units daily  Trulicity 4.5 mg weekly      Last Eye Exam: Not on file  Last Foot Exam: Not on file - b/l AKA  Health Maintenance   Topic Date Due   • Diabetic Eye Exam  12/09/2022     Pertinent Medical History   Patient with a history of type 2 diabetes for over 10 years.  She has a history complicated by peripheral neuropathy, peripheral arterial disease, bilateral AKA's, CVA.        Review of Systems as per HPI         Medical History Reviewed by provider this encounter:     .    Objective {?Quick Links Trend Vitals * Enter New Vitals * Results Review * Timeline (Adult) * Labs * Imaging * Cardiology * Procedures * Lung Cancer Screening * Surgical eConsent :77562}  /88   Pulse 70   SpO2 92%      There is no height or weight on file to calculate BMI.  Wt Readings from Last 3 Encounters:   02/13/25 90.5 kg (199 lb 8 oz)   10/21/24 90.3 kg (199 lb)   06/20/24 89.9 kg (198 lb 1.6 oz)     Physical Exam  Vitals and nursing note reviewed.   Constitutional:        General: She is not in acute distress.     Appearance: She is well-developed. She is morbidly obese.   HENT:      Head: Normocephalic and atraumatic.   Eyes:      General: No scleral icterus.     Conjunctiva/sclera: Conjunctivae normal.   Pulmonary:      Effort: Pulmonary effort is normal.   Abdominal:      Palpations: Abdomen is soft.   Musculoskeletal:      Right Lower Extremity: Right leg is amputated above knee.      Left Lower Extremity: Left leg is amputated above knee.   Neurological:      Mental Status: She is alert.   Psychiatric:         Attention and Perception: Attention normal.         Labs:   Lab Results   Component Value Date    HGBA1C 9.1 (A) 03/19/2025    HGBA1C 9.4 10/16/2024    HGBA1C 9.8 (H) 10/25/2022     Lab Results   Component Value Date    CREATININE 1.30 06/05/2023    CREATININE 1.39 04/17/2023    CREATININE 1.31 04/13/2023    BUN 41 (H) 03/06/2023    K 4.4 03/06/2023     03/06/2023    CO2 23 03/06/2023     eGFRcr   Date Value Ref Range Status   03/06/2023 63 >59 Final     EXTERNAL EGFR   Date Value Ref Range Status   06/05/2023 10  Final     Lab Results   Component Value Date    HDL 28 (L) 11/14/2022    TRIG 677 (H) 01/21/2023     Lab Results   Component Value Date     (H) 03/06/2023    AST 74 (H) 03/06/2023    ALKPHOS 254 (H) 03/06/2023     Lab Results   Component Value Date    GFS4AQPDTVYP 1.367 01/23/2023    KMD7OKWGXCEE 2.410 08/12/2021    ZTI3PKKKIAMP 2.990 11/17/2020       Patient Instructions   Continue your medications with the following changes:  Discontinue Trulicity and replace with Mounjaro, plan to increase to next dose, 5mg weekly if tolerated for one month at starter dose  Contact the office if you develop low sugars while up-titrating Mounjaro  Transition Noé 2 to Noé 3+ if new  is covered by insurance  Monitor blood sugars regularly  Contact the office with any severe hypoglycemic or hyperglycemic events  Maintain appropriate diet and physical  activity  Follow up in 3 months  Call with any questions, concerns, or refill requests  Obtain labs prior to your next appointment    Discussed with the patient and all questioned fully answered. She will call me if any problems arise.

## 2025-03-19 NOTE — ASSESSMENT & PLAN NOTE
Lab Results   Component Value Date    HGBA1C 9.1 (A) 03/19/2025   Current HbA1c represents poor control. Blood sugars demonstrating elevations likely due to dietary indiscretion.  Continue current regimen with the following adjustments:  Noé 3+ ordered for patient  Transition [facility] with plan to titrate as tolerated  Advised to adhere to diabetic diet, and recommended staying active/exercising routinely.  Discussed symptoms and treatment of hypoglycemia.    Recommended routine follow-up with Ophthalmology and foot exams.   Ordered blood work to complete prior to next visit.    Orders:  •  Comprehensive metabolic panel; Future  •  Hemoglobin A1C; Future  •  POCT hemoglobin A1c  •  Tirzepatide 2.5 MG/0.5ML SOAJ; Inject 2.5 mg under the skin once a week  •  Tirzepatide 5 MG/0.5ML SOAJ; Inject 5 mg under the skin once a week Do not start before April 16, 2025.  •  Continuous Glucose Sensor (FreeStyle Noé 3 Plus Sensor) MISC; Use 1 each every 15 (fifteen) days  •  Continuous Glucose  (FreeStyle Noé 3 [place]) LITO; Use 1 Device in the morning

## 2025-03-19 NOTE — ASSESSMENT & PLAN NOTE
Continue vitamin D3 2000 units daily  Orders:  •  PTH, intact; Future  •  Vitamin D 25 hydroxy; Future

## 2025-03-19 NOTE — TELEPHONE ENCOUNTER
PA for FreeStyle Noé 3 Sensor SU[facility]ITTED to [facility]    via    []CMM-KEY:   [x]Surescripts-Case ID # [id number]   []Availity-Auth ID # NDC #   []Faxed to plan   []Other website   []Phone call Case ID #     [x]PA sent as URGENT    All office notes, labs and other pertaining documents and studies sent. Clinical questions answered. Awaiting determination from insurance company.     Turnaround time for your insurance to make a decision on your Prior Authorization can take 7-21 business days.

## 2025-03-19 NOTE — TELEPHONE ENCOUNTER
PA for Mounjaro 2.5mg SUBMITTED to [facility]    via    []CMM-KEY:   [x]Surescripts-Case ID # [id number]   []Availity-Auth ID # NDC #   []Faxed to plan   []Other website   []Phone call Case ID #     [x]PA sent as URGENT    All office notes, labs and other pertaining documents and studies sent. Clinical questions answered. Awaiting determination from insurance company.     Turnaround time for your insurance to make a decision on your Prior Authorization can take 7-21 business days.

## 2025-03-19 NOTE — ASSESSMENT & PLAN NOTE
Maintained on levothyroxine 50 mcg daily  Monitor TSH routinely  Orders:  •  TSH, 3rd generation with Free T4 reflex; Future

## 2025-03-20 ENCOUNTER — TELEPHONE (OUTPATIENT)
Age: 65
End: 2025-03-20

## 2025-03-20 NOTE — TELEPHONE ENCOUNTER
Amanda, nurse from [facility], states Mounjaro is not covered by insurance. Staff physician put patient back on Trulicity. Asking if there is an alternative or should patient stay on Trulicity. States she will need and order faxed to [phone]. Thank you.

## 2025-03-21 ENCOUNTER — TELEPHONE (OUTPATIENT)
Age: 65
End: 2025-03-21

## 2025-03-21 DIAGNOSIS — Z79.4 TYPE 2 DIABETES MELLITUS WITH HYPERGLYCEMIA, WITH LONG-TERM CURRENT USE OF INSULIN (HCC): Primary | ICD-10-CM

## 2025-03-21 DIAGNOSIS — E11.65 TYPE 2 DIABETES MELLITUS WITH HYPERGLYCEMIA, WITH LONG-TERM CURRENT USE OF INSULIN (HCC): Primary | ICD-10-CM

## 2025-03-21 NOTE — TELEPHONE ENCOUNTER
PA for Ozempic 0.25mg SUBMITTED to [facility]    via    []CMM-KEY:   [x]Surescripts-Case ID # [id number]  []Availity-Auth ID # NDC #   []Faxed to plan   []Other website   []Phone call Case ID #     []PA sent as URGENT    All office notes, labs and other pertaining documents and studies sent. Clinical questions answered. Awaiting determination from insurance company.     Turnaround time for your insurance to make a decision on your Prior Authorization can take 7-21 business days.

## 2025-03-21 NOTE — TELEPHONE ENCOUNTER
PA for FreeStyle Noé 3 Sensor APPROVED     Date(s) approved 03/20/2025-03/20/2026      Patient advised by        Spoke with nursing home staff: Yenny     []MyChart Message  [x]Phone call   []LMOM  []L/M to call office as no active Communication consent on file  []Unable to leave detailed message as VM not approved on Communication consent       Pharmacy advised by    [x]Fax  []Phone call  []Secure Chat    Specialty Pharmacy    []     Approval letter scanned into Media Yes

## 2025-03-24 NOTE — TELEPHONE ENCOUNTER
PA for Ozempic 0.25mg APPROVED     Date(s) approved 03/21/2025-09/17/2025      Patient advised by      Spoke with Amanda at facility    []MyChart Message  [x]Phone call   []LMOM  []L/M to call office as no active Communication consent on file  []Unable to leave detailed message as VM not approved on Communication consent       Pharmacy advised by    []Fax  []Phone call  []Secure Chat    Specialty Pharmacy    []     Approval letter scanned into Media Yes

## 2025-04-16 ENCOUNTER — TELEPHONE (OUTPATIENT)
Dept: OTHER | Facility: OTHER | Age: 65
End: 2025-04-16

## 2025-04-16 NOTE — TELEPHONE ENCOUNTER
Clinical staff from [facility] calling to report labs to on call provider .  Call paged out to Trip on call provider.

## 2025-04-18 ENCOUNTER — NURSING HOME VISIT (OUTPATIENT)
Dept: GERIATRICS | Facility: OTHER | Age: 65
End: 2025-04-18
Payer: COMMERCIAL

## 2025-04-18 DIAGNOSIS — G54.6 PHANTOM PAIN AFTER AMPUTATION OF LOWER EXTREMITY (HCC): ICD-10-CM

## 2025-04-18 DIAGNOSIS — E78.49 FAMILIAL COMBINED HYPERLIPIDEMIA: ICD-10-CM

## 2025-04-18 DIAGNOSIS — E21.3 HYPERPARATHYROIDISM (HCC): ICD-10-CM

## 2025-04-18 DIAGNOSIS — E11.51 DM (DIABETES MELLITUS) TYPE II, CONTROLLED, WITH PERIPHERAL VASCULAR DISORDER (HCC): ICD-10-CM

## 2025-04-18 DIAGNOSIS — D50.8 IRON DEFICIENCY ANEMIA SECONDARY TO INADEQUATE DIETARY IRON INTAKE: ICD-10-CM

## 2025-04-18 DIAGNOSIS — R94.31 PROLONGED Q-T INTERVAL ON ECG: ICD-10-CM

## 2025-04-18 DIAGNOSIS — Z86.73 HISTORY OF CVA (CEREBROVASCULAR ACCIDENT): ICD-10-CM

## 2025-04-18 DIAGNOSIS — F32.A ANXIETY AND DEPRESSION: ICD-10-CM

## 2025-04-18 DIAGNOSIS — Z89.612 S/P BILATERAL ABOVE KNEE AMPUTATION (HCC): ICD-10-CM

## 2025-04-18 DIAGNOSIS — I73.9 PAD (PERIPHERAL ARTERY DISEASE) (HCC): Chronic | ICD-10-CM

## 2025-04-18 DIAGNOSIS — Z86.74 HISTORY OF CARDIAC ARREST: ICD-10-CM

## 2025-04-18 DIAGNOSIS — K21.9 GASTROESOPHAGEAL REFLUX DISEASE, UNSPECIFIED WHETHER ESOPHAGITIS PRESENT: ICD-10-CM

## 2025-04-18 DIAGNOSIS — S78.119S: ICD-10-CM

## 2025-04-18 DIAGNOSIS — Z89.611 S/P BILATERAL ABOVE KNEE AMPUTATION (HCC): ICD-10-CM

## 2025-04-18 DIAGNOSIS — E03.9 ACQUIRED HYPOTHYROIDISM: ICD-10-CM

## 2025-04-18 DIAGNOSIS — N18.30 CHRONIC RENAL INSUFFICIENCY, STAGE 3 (MODERATE) (HCC): ICD-10-CM

## 2025-04-18 DIAGNOSIS — D72.823 LEUKEMOID REACTION: ICD-10-CM

## 2025-04-18 DIAGNOSIS — F41.9 ANXIETY AND DEPRESSION: ICD-10-CM

## 2025-04-18 DIAGNOSIS — I10 PRIMARY HYPERTENSION: Primary | ICD-10-CM

## 2025-04-18 PROCEDURE — 99309 SBSQ NF CARE MODERATE MDM 30: CPT | Performed by: STUDENT IN AN ORGANIZED HEALTH CARE EDUCATION/TRAINING PROGRAM

## 2025-04-20 PROBLEM — E87.6 HYPOKALEMIA: Status: RESOLVED | Noted: 2022-10-22 | Resolved: 2025-04-20

## 2025-04-20 PROBLEM — R79.89 TROPONIN LEVEL ELEVATED: Status: RESOLVED | Noted: 2023-01-18 | Resolved: 2025-04-20

## 2025-04-20 PROBLEM — R63.5 WEIGHT GAIN: Status: RESOLVED | Noted: 2023-08-22 | Resolved: 2025-04-20

## 2025-04-20 PROBLEM — L97.409 DIABETIC ULCER OF HEEL ASSOCIATED WITH DIABETES MELLITUS DUE TO UNDERLYING CONDITION (HCC): Status: RESOLVED | Noted: 2022-11-10 | Resolved: 2025-04-20

## 2025-04-20 PROBLEM — E08.621 DIABETIC ULCER OF HEEL ASSOCIATED WITH DIABETES MELLITUS DUE TO UNDERLYING CONDITION (HCC): Status: RESOLVED | Noted: 2022-11-10 | Resolved: 2025-04-20

## 2025-04-20 PROBLEM — R01.1 SYSTOLIC MURMUR: Status: RESOLVED | Noted: 2017-05-08 | Resolved: 2025-04-20

## 2025-04-20 PROBLEM — E44.0 MODERATE PROTEIN-CALORIE MALNUTRITION (HCC): Status: RESOLVED | Noted: 2020-02-03 | Resolved: 2025-04-20

## 2025-04-20 NOTE — PROGRESS NOTES
[facility]  Facility: [facility]    PROGRESS NOTE  Nursing Home Place of Service: nursing home place of service: POS 32 Unskilled- No Part A Coverage    NAME: Dianna Varghese  : 1960 AGE: 64 y.o. SEX: female MRN: [id number]  DATE OF ENCOUNTER: 2025    Assessment and Plan     Problem List Items Addressed This Visit       Esophageal reflux    -stable per patient  -continue PRN calcium carbonate. Pantoprazole recently discontinued and she seems stable without this.  -recommend OOB with meals, sit upright for at least 30 minutes afterwards, avoid trigger foods  -continue to monitor  -follow up with GI as appropriate/needed         DM (diabetes mellitus) type II, controlled, with peripheral vascular disorder (HCC)      Lab Results   Component Value Date    HGBA1C 9.1 (A) 2025     Recent A1c 9.1 worsened  Encourage CCD  Monitor glucose routinely - recent fasting sugars have generally been mid - high 100s, not yet well controlled  Avoid hypoglycemia  Encourage lifestyle modifications including healthy diet, weight management, exercise as appropriate  Following Endocrine outpatient. Follow up Endocrine/Ophthalmology/Podiatry (at facility) as appropriate  ? Continue Lantus, will increase from 55u to 60u daily, titrate further as appropriate.  ? Recently changed from Trulicity to Ozempic per Endocrine. Presently on 0.25mg weekly, titrating up to 0.5mg weekly by end of April. Continue to monitor, appears to be tolerating well presently.  ? Follow up HgbA1c Q3 months July/Oct//April           Anxiety and depression    mood stable and well controlled overall. Mood tends to be down over winter/holidays. Gets down at times.  Psych following at facility  Supportive care  Continue sertraline 100 mg po daily at bedtime  Has alprazolam 0.25mg daily PRN as well  (Was on quetiapine, has been weaned and discontinued as of 2024 and appears tolerating well without it)  (Was on bupropion,  has been discontinued as of Jan 2025 and appears stable without it)  Not yet ready to try weaning above regimen, continue to look for opportunities for GDR in conjunction with Psych         Hypertension - Primary    Monitor BP - generally stable, somewhat variable, around 120s-140s systolic, at times lower or higher  Pulse generally 60s-80s  Avoid hypotension  No acute cardiac complaints  Continue regimen including amlodipine 10mg daily, lisinopril 40mg daily, metoprolol tartrate 50mg BID, HCTZ 25mg daily, hydralazine 100mg TID, amiodarone 100mg daily  Consider weaning/adjusting extensive regimen as appropriate, especially if BP remains well controlled or becomes softer. For now seems stable on current regimen  Following Cardiology outpatient         Familial combined hyperlipidemia    Continue statin          Hyperparathyroidism (HCC)    Seems to be primary on chart review  Continue multivitamin/vit D  Monitor calcium on routine labs - has been stable/WNL recently  Following Endocrine outpatient         PAD (peripheral artery disease) (HCC) (Chronic)    S/p left AKA on 11/23/22  S/p right AKA stump revision on 01/27/23  Continue aspirin, statin, Eliquis  Working with rehab regarding prosthetics and power wheelchair, PT/OT as appropriate  Follow up with Vascular as appropriate         Anemia    Seems fairly mild, stable on lab review  -monitor on routine labs  -borderline microcytic and hx of low iron on lab review - continue ferrous sulfate PO qOD  -likely component of CKD as well  -monitor for acute bleed - no present signs  -consider further workup, Heme consult if persistent/worsening  -transfuse PRN Hb <7  -low threshold to hold anticoagulation if evidence of bleed or worsening Hb         History of CVA (cerebrovascular accident)    Pt had right CVA 11/14/2022  Continue aspirin, statin  Pt has been off plavix since 03/2023 after vascular visit         Prolonged Q-T interval on ECG    Noted on prior EKGs  No  apparent acute/associated symptoms  Avoid QT prolonging medications as able  Following with Cardiology         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)    Bilateral AKA as above         S/P bilateral above knee amputation (HCC)    Working with PT/OT  Working with prosthetics  Working with power wheelchair training         Acquired hypothyroidism    Last TSH elevated from Feb 2024  Continue levothyroxine - recent increase to 62.5mcg daily, continue same at present as subsequent TSH improving as of April  No apparent acute/associated symptoms   Monitor TSH periodically; Adjust dosing as appropriate  next TSH check in June 2025  Patient is following Endocrine outpatient         History of cardiac arrest    S/p cardiac arrest on 1/18/2023 considered due to electrolyte abnormalities/severe sepsis  No recent/acute cardiac complaints  Continue cardiac regimen including aspirin, Eliquis, amiodarone, atorvastatin, beta blocker  Follow up with Cardiology as appropriate/yearly         Leukemoid reaction    Patient with persistent mildly elevated WBC/neutrophilia since around June 2024  Seems to have coincided with likely viral URI and associated diarrhea around that time however has not improved  Monitor on routine labs - seems fairly mild but persistent as of Dec 2024  Monitor for acute infectious symptoms - none presently  Patient denies any unintentional weight loss, fevers, night sweats, chills, or other overt signs of systemic or malignant pathology.  Has established with Heme/Onc with workup pending, next visit May 2025         Phantom pain after amputation of lower extremity (HCC)    Phantom limb pains L>R lower extremity, intermittent, at random times but generally worse overnight  Gabapentin recently titrated to 200mg BID, she thinks this has been very helpful, the phantom pains are much improved and no longer bothering her lately, she feels stable with current dosing  Continue to monitor - much  improved/possibly resolved as no recent concerns of phantom pain. If remaining stable would consider weaning gabapentin dose next visit. She does not presently feel ready to wean down due to fear of pain returning.         Chronic renal insufficiency, stage 3 (moderate) (HCC)    Lab Results   Component Value Date    EGFR 10 06/05/2023    EGFR 54 05/15/2023    EGFR 43 04/17/2023    CREATININE 1.30 06/05/2023    CREATININE 1.39 04/17/2023    CREATININE 1.31 04/13/2023       Baseline GFR seems consistent with CKD3a  Monitor renal function on routine labs - stable  Avoid nephrotoxins, NSAIDs as able  Encourage PO hydration, respecting volume status                                  Chief Complaint     Follow up 60 day    History of Present Illness     Dianna Varghese is a 64 y.o. female who was seen today for follow up. PMH including bilateral above knee amputations, cardiac arrest, type 2 DM, TIA, major depressive disorder, peripheral vascular disease, hypertension, hyperlipidemia, and GERD   Code Status: Full    Patient seen and examined in room  Others present: none  Patient sitting up in bed working on phone  Appears comfortable, awake, alert, oriented to situation, able to converse appropriately  Very polite, Aox3, in very good spirits, appears to be a reasonable historian, mentation appearing similar to my prior visit. She notes she feels very well lately, no new/acute concerns. Remains very pleased she has finally gotten her power wheelchair and has been training in using it with therapy though reports not yet cleared for independent driving. Intends to return to more prosthetics training after that. Reports good stable appetite, no swallowing trouble, no abd pain/N/V, recently BM regular/normal/easy. Urinating well without acute issue. Breathing fine on room air, no orthopnea. No CP/palpitations including on exertion; does have some chronic stable dyspnea on exertion which is unchanged. Denies orthostatic  "lightheadedness. Sleeping well. No acute pain anywhere; she has previously had intermittent phantom limb pains L>R, generally worse overnight, for which gabapentin has been very helpful and the issue is much improved/resolved compared to previously.  She does not feel acutely sick or feverish or confused. No acute cardiopulmonary, abdominal, or urinary symptoms; see ROS for more details.    We discussed updating routine screenings including colonoscopy, mammogram, DXA - patient states at this time she would prefer to hold off on any such screenings until she is more independently mobile and comfortable with her power chair and prosthetics - can revisit in future.    No further questions or acute concerns identified.  No further acute concerns per nursing.     Lab Review:   1/18/24: CBC with Hb 10.8; CMP with Cr 1.43, eGFR 41 (recent baseline seems around 40s-60s); TSH 8.20, FT4 0.6; A1c 7.7  3/6/24: CBC with Hb 10.8; BMP generally stable, Cr 1.41, eGFR 42; TSH 7.96  4/18/24: CBC with Hb 10.9; CMP with Cr 1.51, eGFR 39; A1c 9.3; TSH WNL  6/3/24: CBC with WBC 11.8, Hb 10.5  6/10/24: CBC with WBC 12.3, Hb 10.5  6/17/24: CBC with WBC 12.1, Hb 10.5  6/26/24: CBC with WBC 10.7, Hb 10.6; BMP generally stable/non-actionable, Cr 1.08, GFR 58; Mg 1.7  7/17/24: CBC with WBC 11.8, Hb 11.0; CMP generally stable/non-actionable, Cr 1.43, eGFR 41; A1c 9.7  8/14/24: BMP with Cr 1.21, GFR 50  8/28/24: CBC with WBC 11.9, Hb 11.3  10/16/24: CBC with WBC 11.2, Hb 11.6; CMP generally stable/non-actionable, Cr 0.99, eGFR 64; A1c 9.4  10/21/24: CBC with WBC 12.0, Hb 11.2, manual diff with elevated neutrophils  12/12/24: CBC with WBC 11.8, Hb 10.9  1/9/25: respiratory viral panel negative  1/15/25: CBC with WBC 13.6, Hb 10.7; CMP with Cr 1.25, GFR 48; A1c 8.3  1/28/25: CBC with WBC 10.7, Hb 10.8; CMP with Cr 1.21, GFR 50; ESR 46; CRP 19.7; iron 46; flow cytometry \"There is no immunophenotypic evidence of a clonal B or T-cell population or " "increase in CD34+ blasts.\"  2/14-19/25: TSH 11.55, fT4 WNL  4/1/25: TSH 7.38, fT4 WNL  4/16/25: CBC with WBC 13.7, Hb 10.5; CMP with Cr 1.18, GFR 52; A1c 9.1        EKG Jan 2023: NSR, 91bpm, Qtc 511  LEON Jan 2023: EF 60, \"No vegetations or other evidence of endocarditis seen \"       The following portions of the patient's history were reviewed and updated as appropriate: allergies, current medications, past family history, past medical history, past social history, past surgical history and problem list.    Review of Systems     Review of Systems   Constitutional:  Negative for appetite change, chills, diaphoresis and fever.   HENT:  Negative for congestion, drooling, ear pain, hearing loss, rhinorrhea, sore throat and trouble swallowing.    Eyes:  Negative for pain, discharge, redness, itching and visual disturbance.   Respiratory:  Negative for cough, chest tightness, shortness of breath and wheezing.    Cardiovascular:  Negative for chest pain, palpitations and leg swelling.   Gastrointestinal:  Negative for abdominal pain, blood in stool, constipation, diarrhea, nausea and vomiting.   Genitourinary:  Negative for difficulty urinating, dysuria, flank pain and hematuria.   Musculoskeletal:  Positive for gait problem. Negative for arthralgias, back pain and neck pain.   Skin:  Negative for color change.   Neurological:  Negative for dizziness, facial asymmetry, speech difficulty, weakness, light-headedness and headaches.   Psychiatric/Behavioral:  Negative for agitation, behavioral problems and confusion. The patient is not nervous/anxious and is not hyperactive.    All other systems reviewed and are negative.      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 (HCC)    Hyperparathyroidism (HCC)    PAD (peripheral artery disease) (HCC)    Anemia    HIT (heparin-induced thrombocytopenia) (HCC)    Mild protein-calorie malnutrition (HCC)    History of CVA (cerebrovascular accident)    Fall    Hypomagnesemia    Prolonged Q-T interval on ECG    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   Constitutional:       General: She is not in acute distress.     Appearance: She is obese. She is not toxic-appearing or diaphoretic.   HENT:      Head: Normocephalic and atraumatic.      Right Ear: External ear normal.      Left Ear: External ear normal.      Nose: Nose normal. No rhinorrhea.      Mouth/Throat:      Mouth: Mucous membranes are dry.      Pharynx: Oropharynx is clear. No posterior oropharyngeal erythema.   Eyes:      General: No scleral icterus.        Right eye: No discharge.         Left eye: No discharge.      Extraocular Movements: Extraocular movements intact.      Conjunctiva/sclera: Conjunctivae normal.      Pupils: Pupils are equal, round, and reactive to light.   Cardiovascular:      Rate  and Rhythm: Normal rate and regular rhythm.   Pulmonary:      Effort: Pulmonary effort is normal. No respiratory distress.      Breath sounds: Normal breath sounds. No wheezing or rales.   Abdominal:      General: Bowel sounds are normal.      Palpations: Abdomen is soft.      Tenderness: There is no abdominal tenderness. There is no guarding.   Musculoskeletal:      Cervical back: No rigidity.      Comments: Bilateral AKA, sites appear well healed without bleed, drainage, or erythema   Skin:     General: Skin is warm and dry.      Coloration: Skin is not jaundiced.   Neurological:      General: No focal deficit present.      Mental Status: She is alert and oriented to person, place, and time. Mental status is at baseline.   Psychiatric:         Mood and Affect: Mood normal.         Behavior: Behavior normal.         Thought Content: Thought content normal.         Judgment: Judgment normal.         Pertinent Laboratory/Diagnostic Studies:  Laboratory and Imaging studies reviewed. Full report in the paper chart.     Current Medications   Medications reviewed and updated in facility chart.      -Total time spent on this encounter today including documentation and workup review, face to face time, history and exam, and documentation/orders, was approximately 40 minutes.  -This note will be copied to [facility] EMR where vitals and medication orders are placed.    Seb Chun D.O.  Geriatric Medicine  4/20/2025 11:40 PM

## 2025-04-20 NOTE — ASSESSMENT & PLAN NOTE
Last TSH elevated from Feb 2024  Continue levothyroxine - recent increase to 62.5mcg daily, continue same at present as subsequent TSH improving as of April  No apparent acute/associated symptoms   Monitor TSH periodically; Adjust dosing as appropriate  next TSH check in June 2025  Patient is following Endocrine outpatient

## 2025-04-20 NOTE — ASSESSMENT & PLAN NOTE
Lab Results   Component Value Date    HGBA1C 9.1 (A) 03/19/2025     Recent A1c 9.1 worsened  Encourage CCD  Monitor glucose routinely - recent fasting sugars have generally been mid - high 100s, not yet well controlled  Avoid hypoglycemia  Encourage lifestyle modifications including healthy diet, weight management, exercise as appropriate  Following Endocrine outpatient. Follow up Endocrine/Ophthalmology/Podiatry (at facility) as appropriate  ? Continue Lantus, will increase from 55u to 60u daily, titrate further as appropriate.  ? Recently changed from Trulicity to Ozempic per Endocrine. Presently on 0.25mg weekly, titrating up to 0.5mg weekly by end of April. Continue to monitor, appears to be tolerating well presently.  ? Follow up HgbA1c Q3 months July/Oct/Jan/April

## 2025-04-20 NOTE — ASSESSMENT & PLAN NOTE
Monitor BP - generally stable, somewhat variable, around 120s-140s systolic, at times lower or higher  Pulse generally 60s-80s  Avoid hypotension  No acute cardiac complaints  Continue regimen including amlodipine 10mg daily, lisinopril 40mg daily, metoprolol tartrate 50mg BID, HCTZ 25mg daily, hydralazine 100mg TID, amiodarone 100mg daily  Consider weaning/adjusting extensive regimen as appropriate, especially if BP remains well controlled or becomes softer. For now seems stable on current regimen  Following Cardiology outpatient

## 2025-04-21 NOTE — ASSESSMENT & PLAN NOTE
S/p cardiac arrest on 1/18/2023 considered due to electrolyte abnormalities/severe sepsis  No recent/acute cardiac complaints  Continue cardiac regimen including aspirin, Eliquis, amiodarone, atorvastatin, beta blocker  Follow up with Cardiology as appropriate/yearly

## 2025-04-21 NOTE — ASSESSMENT & PLAN NOTE
Patient with persistent mildly elevated WBC/neutrophilia since around June 2024  Seems to have coincided with likely viral URI and associated diarrhea around that time however has not improved  Monitor on routine labs - seems fairly mild but persistent as of Dec 2024  Monitor for acute infectious symptoms - none presently  Patient denies any unintentional weight loss, fevers, night sweats, chills, or other overt signs of systemic or malignant pathology.  Has established with Heme/Onc with workup pending, next visit May 2025

## 2025-04-21 NOTE — ASSESSMENT & PLAN NOTE
Phantom limb pains L>R lower extremity, intermittent, at random times but generally worse overnight  Gabapentin recently titrated to 200mg BID, she thinks this has been very helpful, the phantom pains are much improved and no longer bothering her lately, she feels stable with current dosing  Continue to monitor - much improved/possibly resolved as no recent concerns of phantom pain. If remaining stable would consider weaning gabapentin dose next visit. She does not presently feel ready to wean down due to fear of pain returning.

## 2025-04-22 ENCOUNTER — NURSING HOME VISIT (OUTPATIENT)
Dept: GERIATRICS | Facility: OTHER | Age: 65
End: 2025-04-22
Payer: COMMERCIAL

## 2025-04-22 DIAGNOSIS — E11.51 DM (DIABETES MELLITUS) TYPE II, CONTROLLED, WITH PERIPHERAL VASCULAR DISORDER (HCC): ICD-10-CM

## 2025-04-22 DIAGNOSIS — L08.9 SOFT TISSUE INFECTION: Primary | ICD-10-CM

## 2025-04-22 DIAGNOSIS — D72.823 LEUKEMOID REACTION: ICD-10-CM

## 2025-04-22 PROCEDURE — 99308 SBSQ NF CARE LOW MDM 20: CPT | Performed by: STUDENT IN AN ORGANIZED HEALTH CARE EDUCATION/TRAINING PROGRAM

## 2025-04-23 ENCOUNTER — TELEPHONE (OUTPATIENT)
Dept: OTHER | Facility: OTHER | Age: 65
End: 2025-04-23

## 2025-04-23 PROBLEM — L08.9 SOFT TISSUE INFECTION: Status: ACTIVE | Noted: 2025-04-23

## 2025-04-23 NOTE — ASSESSMENT & PLAN NOTE
Patient with persistent mildly elevated WBC/neutrophilia since around June 2024  Seems to have coincided with likely viral URI and associated diarrhea around that time however has not improved  Monitor on routine labs - seems fairly mild but persistent as of Dec 2024. In setting of current RLE soft tissue infection will recheck CBC after completion of antibiotics next week to monitor WBC trend.  Monitor for acute infectious symptoms - none presently  Patient denies any unintentional weight loss, fevers, night sweats, chills, or other overt signs of systemic or malignant pathology.  Has established with Heme/Onc with workup pending, next visit May 2025

## 2025-04-23 NOTE — PROGRESS NOTES
[facility]  Facility: [facility]    PROGRESS NOTE  Nursing Home Place of Service: nursing home place of service: POS 32 Unskilled- No Part A Coverage    NAME: Dianna Varghese  : 1960 AGE: 64 y.o. SEX: female MRN: [id number]  DATE OF ENCOUNTER: 2025    Assessment and Plan     Problem List Items Addressed This Visit       DM (diabetes mellitus) type II, controlled, with peripheral vascular disorder (HCC)      Lab Results   Component Value Date    HGBA1C 9.1 (A) 2025     Recent A1c 9.1 worsened  Encourage CCD  Monitor glucose routinely - recent fasting sugars have generally been mid 100s, not yet well controlled but better than before  Avoid hypoglycemia  Encourage lifestyle modifications including healthy diet, weight management, exercise as appropriate  Following Endocrine outpatient. Follow up Endocrine/Ophthalmology/Podiatry (at facility) as appropriate  ? Continue Lantus 60u daily (recently increased), titrate further as appropriate.  ? Recently changed from Trulicity to Ozempic per Endocrine. Presently titrating up to 0.5mg weekly at this time, appears to be tolerating well  ? Follow up HgbA1c Q3 months July/Oct//April           Leukemoid reaction    Patient with persistent mildly elevated WBC/neutrophilia since around 2024  Seems to have coincided with likely viral URI and associated diarrhea around that time however has not improved  Monitor on routine labs - seems fairly mild but persistent as of Dec 2024. In setting of current RLE soft tissue infection will recheck CBC after completion of antibiotics next week to monitor WBC trend.  Monitor for acute infectious symptoms - none presently  Patient denies any unintentional weight loss, fevers, night sweats, chills, or other overt signs of systemic or malignant pathology.  Has established with Heme/Onc with workup pending, next visit May 2025         Soft tissue infection - Primary    New finding of 2 small  blister-like lesions at bottom surface of right lower extremity stump, noted since 1-2 days. With some local tenderness and surrounding erythema. Concerning for local soft tissue infection but not presently concerning for severe/deep infection. No systemic signs of infection.  Possibly related to pressure/shear force from andree lift strap. No other known/reported trauma to area recently.  Have ordered keflex 500mg BID x7 days to cover for infection  Have ordered wound care consult.  Have ordered for stump area to be bandaged/covered during andree transfers to prevent any rubbing/pressure as much as possible.  Continue to monitor closely. If issue persists/worsens consider further workup including labs, imaging. Does not presently appears to require a higher level of care.                                    Chief Complaint     Follow up acute - R leg blister/wound    History of Present Illness     Dianna Varghese is a 64 y.o. female who was seen today for follow up. PMH including bilateral above knee amputations, cardiac arrest, type 2 DM, TIA, major depressive disorder, peripheral vascular disease, hypertension, hyperlipidemia, and GERD   Code Status: Full      Asked by nursing to eval patient for new finding of blister/wound on R lower extremity stump concerning for redness/tenderness/possible infection. I had discussed this via phone with nursing late yesterday as well and empirically started patient on keflex course for concern of soft tissue infection as she would be at high risk of osteomyelitis.    Patient seen and examined in room  Others present: none  Patient sitting up in bed  Appears comfortable, awake, alert, oriented to situation, able to converse appropriately  Very polite, Aox3, in very good spirits, appears to be a reasonable historian, mentation appearing similar to my prior visit. She notes she feels fine, but yesterday had noted some tenderness to the bottom of her R stump and is aware of a  blister there that hurts with pressure but has no pain at rest. She has noted the pain primarily because when being lifted via andree lift she notes one of the straps presses against the right leg stump and puts pressure on this area. See plan. She does not feel any systemic symptoms such as fever/malaise/chills. The R leg otherwise appears grossly normal without other erythema/swelling and does not appear concerning for deep/severe infection presently. Remainder of ROS briefly reviewed and stable. She does not feel acutely sick or feverish or confused. No acute cardiopulmonary, abdominal, or urinary symptoms; see ROS for more details. She feels she has been tolerating Ozempic well and amenable to increasing dose as scheduled at this time.    No further questions or acute concerns identified.  No further acute concerns per nursing.     Lab Review:   1/18/24: CBC with Hb 10.8; CMP with Cr 1.43, eGFR 41 (recent baseline seems around 40s-60s); TSH 8.20, FT4 0.6; A1c 7.7  3/6/24: CBC with Hb 10.8; BMP generally stable, Cr 1.41, eGFR 42; TSH 7.96  4/18/24: CBC with Hb 10.9; CMP with Cr 1.51, eGFR 39; A1c 9.3; TSH WNL  6/3/24: CBC with WBC 11.8, Hb 10.5  6/10/24: CBC with WBC 12.3, Hb 10.5  6/17/24: CBC with WBC 12.1, Hb 10.5  6/26/24: CBC with WBC 10.7, Hb 10.6; BMP generally stable/non-actionable, Cr 1.08, GFR 58; Mg 1.7  7/17/24: CBC with WBC 11.8, Hb 11.0; CMP generally stable/non-actionable, Cr 1.43, eGFR 41; A1c 9.7  8/14/24: BMP with Cr 1.21, GFR 50  8/28/24: CBC with WBC 11.9, Hb 11.3  10/16/24: CBC with WBC 11.2, Hb 11.6; CMP generally stable/non-actionable, Cr 0.99, eGFR 64; A1c 9.4  10/21/24: CBC with WBC 12.0, Hb 11.2, manual diff with elevated neutrophils  12/12/24: CBC with WBC 11.8, Hb 10.9  1/9/25: respiratory viral panel negative  1/15/25: CBC with WBC 13.6, Hb 10.7; CMP with Cr 1.25, GFR 48; A1c 8.3  1/28/25: CBC with WBC 10.7, Hb 10.8; CMP with Cr 1.21, GFR 50; ESR 46; CRP 19.7; iron 46; flow cytometry  "\"There is no immunophenotypic evidence of a clonal B or T-cell population or increase in CD34+ blasts.\"  2/14-19/25: TSH 11.55, fT4 WNL  4/1/25: TSH 7.38, fT4 WNL  4/16/25: CBC with WBC 13.7, Hb 10.5; CMP with Cr 1.18, GFR 52; A1c 9.1        EKG Jan 2023: NSR, 91bpm, Qtc 511  LEON Jan 2023: EF 60, \"No vegetations or other evidence of endocarditis seen \"       The following portions of the patient's history were reviewed and updated as appropriate: allergies, current medications, past family history, past medical history, past social history, past surgical history and problem list.    Review of Systems     Review of Systems   Constitutional:  Negative for chills, diaphoresis, fatigue and fever.   HENT:  Negative for drooling, ear pain, hearing loss and rhinorrhea.    Respiratory:  Negative for cough, chest tightness, shortness of breath and wheezing.    Cardiovascular:  Negative for chest pain and leg swelling.   Gastrointestinal:  Negative for abdominal pain, blood in stool, constipation, diarrhea, nausea and vomiting.   Musculoskeletal:  Positive for gait problem. Negative for arthralgias, back pain and neck pain.   Skin:  Positive for wound. Negative for color change.   Neurological:  Negative for dizziness, facial asymmetry, speech difficulty, weakness, light-headedness and headaches.   Psychiatric/Behavioral:  Negative for agitation, behavioral problems and confusion. The patient is not nervous/anxious and is not hyperactive.    All other systems reviewed and are negative.      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 (HCC)    Hyperparathyroidism (HCC)    PAD (peripheral artery disease) (HCC)    Anemia    HIT (heparin-induced thrombocytopenia) (HCC)    Mild protein-calorie malnutrition (HCC)    History of CVA (cerebrovascular accident)    Fall    Hypomagnesemia    Prolonged Q-T interval on ECG    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)    S/P bilateral above knee amputation (HCC)    Acquired hypothyroidism    History of cardiac arrest    Advance care planning    Leukemoid reaction    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   Constitutional:       General: She is not in acute distress.     Appearance: She is obese. She is not toxic-appearing or diaphoretic.   HENT:      Head: Normocephalic and atraumatic.      Right Ear: External ear normal.      Left Ear: External ear normal.      Nose: Nose normal. No rhinorrhea.      Mouth/Throat:      Mouth: Mucous membranes are dry.      Pharynx: Oropharynx is clear. No posterior oropharyngeal erythema.   Eyes:      General: No scleral icterus.        Right eye: No discharge.         Left eye: No discharge.      Extraocular Movements: Extraocular movements intact.      Conjunctiva/sclera: Conjunctivae normal.      Pupils: Pupils are equal, round, and reactive to light.   Cardiovascular:      Rate and Rhythm: Normal rate and regular rhythm.   Pulmonary:      Effort: Pulmonary effort is normal. No respiratory  distress.      Breath sounds: Normal breath sounds. No wheezing or rales.   Abdominal:      General: Bowel sounds are normal.      Palpations: Abdomen is soft.      Tenderness: There is no abdominal tenderness. There is no guarding.   Musculoskeletal:      Cervical back: No rigidity.      Comments: Bilateral AKA, sites appear well healed without bleed/drainage  Bottom surface of right leg stump noted to have one ~0.5 inch ovoid blister appearing pus-filled, mildly tender, mild surrounding erythema, and another ~0.5 inch ovoid erythematous-appearing blister moderately tender with surrounding erythema. No open wound/bleed/drainage.   Skin:     General: Skin is warm and dry.      Coloration: Skin is not jaundiced.   Neurological:      General: No focal deficit present.      Mental Status: She is alert and oriented to person, place, and time. Mental status is at baseline.   Psychiatric:         Mood and Affect: Mood normal.         Behavior: Behavior normal.         Thought Content: Thought content normal.         Judgment: Judgment normal.         Pertinent Laboratory/Diagnostic Studies:  Laboratory and Imaging studies reviewed. Full report in the paper chart.     Current Medications   Medications reviewed and updated in facility chart.      -Total time spent on this encounter today including documentation and workup review, face to face time, history and exam, and documentation/orders, was approximately 25 minutes.  -This note will be copied to [facility] EMR where vitals and medication orders are placed.    Seb Chun D.O.  Geriatric Medicine  4/23/2025 1:07 PM

## 2025-04-23 NOTE — ASSESSMENT & PLAN NOTE
New finding of 2 small blister-like lesions at bottom surface of right lower extremity stump, noted since 1-2 days. With some local tenderness and surrounding erythema. Concerning for local soft tissue infection but not presently concerning for severe/deep infection. No systemic signs of infection.  Possibly related to pressure/shear force from andree lift strap. No other known/reported trauma to area recently.  Have ordered keflex 500mg BID x7 days to cover for infection  Have ordered wound care consult.  Have ordered for stump area to be bandaged/covered during andree transfers to prevent any rubbing/pressure as much as possible.  Continue to monitor closely. If issue persists/worsens consider further workup including labs, imaging. Does not presently appears to require a higher level of care.

## 2025-04-23 NOTE — ASSESSMENT & PLAN NOTE
Lab Results   Component Value Date    HGBA1C 9.1 (A) 03/19/2025     Recent A1c 9.1 worsened  Encourage CCD  Monitor glucose routinely - recent fasting sugars have generally been mid 100s, not yet well controlled but better than before  Avoid hypoglycemia  Encourage lifestyle modifications including healthy diet, weight management, exercise as appropriate  Following Endocrine outpatient. Follow up Endocrine/Ophthalmology/Podiatry (at facility) as appropriate  ? Continue Lantus 60u daily (recently increased), titrate further as appropriate.  ? Recently changed from Trulicity to Ozempic per Endocrine. Presently titrating up to 0.5mg weekly at this time, appears to be tolerating well  ? Follow up HgbA1c Q3 months July/Oct/Jan/April

## 2025-04-23 NOTE — TELEPHONE ENCOUNTER
Nursing home or Independent living name:  [facility]    Who is calling:  Lilia     Callback number:   [phone]     Symptoms:   Medication problem    Message sent to on call provider via ESC; verified read receipt of message.

## 2025-04-25 ENCOUNTER — NURSING HOME VISIT (OUTPATIENT)
Dept: GERIATRICS | Facility: OTHER | Age: 65
End: 2025-04-25
Payer: COMMERCIAL

## 2025-04-25 DIAGNOSIS — L08.9 SOFT TISSUE INFECTION: Primary | ICD-10-CM

## 2025-04-25 DIAGNOSIS — E11.51 DM (DIABETES MELLITUS) TYPE II, CONTROLLED, WITH PERIPHERAL VASCULAR DISORDER (HCC): ICD-10-CM

## 2025-04-25 PROCEDURE — 99308 SBSQ NF CARE LOW MDM 20: CPT | Performed by: STUDENT IN AN ORGANIZED HEALTH CARE EDUCATION/TRAINING PROGRAM

## 2025-04-28 NOTE — PROGRESS NOTES
[facility]  Facility: [facility]    PROGRESS NOTE  Nursing Home Place of Service: nursing home place of service: POS 32 Unskilled- No Part A Coverage    NAME: Dianna Varghese  : 1960 AGE: 64 y.o. SEX: female MRN: [id number]  DATE OF ENCOUNTER: 2025    Assessment and Plan     Problem List Items Addressed This Visit       DM (diabetes mellitus) type II, controlled, with peripheral vascular disorder (HCC)      Lab Results   Component Value Date    HGBA1C 9.1 (A) 2025     Recent A1c 9.1 worsened  Encourage CCD  Monitor glucose routinely - recent fasting sugars have generally been mid-high 100s, not yet well controlled but overall better than before  Avoid hypoglycemia  Encourage lifestyle modifications including healthy diet, weight management, exercise as appropriate  Following Endocrine outpatient. Follow up Endocrine/Ophthalmology/Podiatry (at facility) as appropriate  ? Continue Lantus, will increase from 60 to 65u daily, titrate further as appropriate.  ? Recently changed from Trulicity to Ozempic per Endocrine. Has been titrated up to 0.5mg weekly, appears to be tolerating well  ? Follow up HgbA1c Q3 months July/Oct//April           Soft tissue infection - Primary    Recent finding of 2 small blister-like lesions at bottom surface of right lower extremity stump. With some local tenderness and surrounding erythema. Concerning for local soft tissue infection but not presently concerning for severe/deep infection. No systemic signs of infection.  Possibly related to pressure/shear force from andree lift strap. No other known/reported trauma to area recently.  completing keflex 500mg BID x7 days to cover for infection  wound care consulted and following  Wound culture was negative however was collected after antibiotic was initiated therefore limited utility, though useful to know that no resistant organisms are growing.  Have ordered for stump area to be bandaged/covered  during andree transfers to prevent any rubbing/pressure as much as possible. This is being done and seems helpful  Continue to monitor closely. Appears to be gradually improving. If issue persists/worsens consider further workup including labs, imaging. Does not presently appears to require a higher level of care.                                      Chief Complaint     Follow up acute - R leg blister/wounds/soft tissue infection, also tolerance of higher Ozempic dose and persistently elevated sugars    History of Present Illness     Dianna Varghese is a 64 y.o. female who was seen today for follow up. PMH including bilateral above knee amputations, cardiac arrest, type 2 DM, TIA, major depressive disorder, peripheral vascular disease, hypertension, hyperlipidemia, and GERD   Code Status: Full      Closely following up with patient for multiple reasons inclulding close monitoring of right leg soft tissue infection as well as ensuring good tolerance of ozempic at higher dosing    Patient seen and examined in room  Others present: none  Patient sitting up in bed watching TV  Appears comfortable, awake, alert, oriented to situation, able to converse appropriately  Very polite, Aox3, in good spirits, appears to be a reasonable historian, mentation appearing similar to my prior visit. She notes she feels well lately with no new concerns. She feels the tenderness at the blister sites on right stump is overall stable/slightly improved, no worsening. Tolerating antibiotic well. She does not feel any systemic symptoms such as fever/malaise/chills. The R leg otherwise appears grossly normal without other erythema/swelling and does not appear concerning for deep/severe infection presently. Remainder of ROS briefly reviewed and stable. She does not feel acutely sick or feverish or confused. No acute cardiopulmonary, abdominal, or urinary symptoms; see ROS for more details. She feels she has been tolerating Ozempic well and  "retains good appetite and no acute gastroparesis symptoms.    No further questions or acute concerns identified.  No further acute concerns per nursing.     Lab Review:   1/18/24: CBC with Hb 10.8; CMP with Cr 1.43, eGFR 41 (recent baseline seems around 40s-60s); TSH 8.20, FT4 0.6; A1c 7.7  3/6/24: CBC with Hb 10.8; BMP generally stable, Cr 1.41, eGFR 42; TSH 7.96  4/18/24: CBC with Hb 10.9; CMP with Cr 1.51, eGFR 39; A1c 9.3; TSH WNL  6/3/24: CBC with WBC 11.8, Hb 10.5  6/10/24: CBC with WBC 12.3, Hb 10.5  6/17/24: CBC with WBC 12.1, Hb 10.5  6/26/24: CBC with WBC 10.7, Hb 10.6; BMP generally stable/non-actionable, Cr 1.08, GFR 58; Mg 1.7  7/17/24: CBC with WBC 11.8, Hb 11.0; CMP generally stable/non-actionable, Cr 1.43, eGFR 41; A1c 9.7  8/14/24: BMP with Cr 1.21, GFR 50  8/28/24: CBC with WBC 11.9, Hb 11.3  10/16/24: CBC with WBC 11.2, Hb 11.6; CMP generally stable/non-actionable, Cr 0.99, eGFR 64; A1c 9.4  10/21/24: CBC with WBC 12.0, Hb 11.2, manual diff with elevated neutrophils  12/12/24: CBC with WBC 11.8, Hb 10.9  1/9/25: respiratory viral panel negative  1/15/25: CBC with WBC 13.6, Hb 10.7; CMP with Cr 1.25, GFR 48; A1c 8.3  1/28/25: CBC with WBC 10.7, Hb 10.8; CMP with Cr 1.21, GFR 50; ESR 46; CRP 19.7; iron 46; flow cytometry \"There is no immunophenotypic evidence of a clonal B or T-cell population or increase in CD34+ blasts.\"  2/14-19/25: TSH 11.55, fT4 WNL  4/1/25: TSH 7.38, fT4 WNL  4/16/25: CBC with WBC 13.7, Hb 10.5; CMP with Cr 1.18, GFR 52; A1c 9.1  4/23/25: R stump wound culture \"No Neutrophils noted.No organisms observed. CULTURE RESULTS: NO GROWTH\"        EKG Jan 2023: NSR, 91bpm, Qtc 511  LEON Jan 2023: EF 60, \"No vegetations or other evidence of endocarditis seen \"       The following portions of the patient's history were reviewed and updated as appropriate: allergies, current medications, past family history, past medical history, past social history, past surgical history and problem " list.    Review of Systems     Review of Systems   Constitutional:  Negative for appetite change, chills, diaphoresis, fatigue and fever.   HENT:  Negative for drooling, ear pain and hearing loss.    Respiratory:  Negative for cough, chest tightness, shortness of breath and wheezing.    Cardiovascular:  Negative for chest pain and leg swelling.   Gastrointestinal:  Negative for abdominal pain, blood in stool, constipation, diarrhea, nausea and vomiting.   Musculoskeletal:  Positive for gait problem. Negative for arthralgias, back pain and neck pain.   Skin:  Positive for wound. Negative for color change.   Neurological:  Negative for dizziness, facial asymmetry, speech difficulty, weakness, light-headedness and headaches.   Psychiatric/Behavioral:  Negative for agitation, behavioral problems and confusion. The patient is not nervous/anxious and is not hyperactive.    All other systems reviewed and are negative.      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 ([facility])    Hyperparathyroidism (HCC)    PAD (peripheral artery disease) ([facility])    Anemia    HIT (heparin-induced thrombocytopenia) ([facility])    Mild protein-calorie malnutrition ([facility])    History of CVA (cerebrovascular accident)    Fall    Hypomagnesemia    Prolonged Q-T interval on ECG    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 ([facility])    S/P bilateral above knee amputation ([facility])     Acquired hypothyroidism    History of cardiac arrest    Advance care planning    Leukemoid reaction    Phantom pain after amputation of lower extremity (HCC)    Polypharmacy    Chronic renal insufficiency, stage 3 (moderate) (HCC)    Primary open angle glaucoma (POAG) of left eye, mild stage    Soft tissue infection       Objective     Vital Signs:     BP: 154/60 mmHg   Temp:98.7 °F   Pulse:71 bpm   Weight:204.5 Lbs  4/3/2025 10:53   Resp:17 Breaths/min   BS:172 mg/dL   O2:95 %   Pain:0         Physical Exam  Vitals reviewed.   Constitutional:       General: She is not in acute distress.     Appearance: She is obese. She is not toxic-appearing or diaphoretic.   HENT:      Head: Normocephalic and atraumatic.      Right Ear: External ear normal.      Left Ear: External ear normal.      Nose: Nose normal. No rhinorrhea.      Mouth/Throat:      Mouth: Mucous membranes are dry.      Pharynx: Oropharynx is clear. No posterior oropharyngeal erythema.   Eyes:      General: No scleral icterus.        Right eye: No discharge.         Left eye: No discharge.      Extraocular Movements: Extraocular movements intact.      Conjunctiva/sclera: Conjunctivae normal.      Pupils: Pupils are equal, round, and reactive to light.   Cardiovascular:      Rate and Rhythm: Normal rate and regular rhythm.   Pulmonary:      Effort: Pulmonary effort is normal. No respiratory distress.      Breath sounds: Normal breath sounds. No wheezing or rales.   Abdominal:      General: Bowel sounds are normal.      Palpations: Abdomen is soft.      Tenderness: There is no abdominal tenderness. There is no guarding.   Musculoskeletal:      Cervical back: No rigidity.      Comments: Bilateral AKA, sites appear well healed  Bottom surface of right leg stump noted to have one ~0.5 inch ovoid blister appearing pus-filled, minimally tender, mild surrounding erythema, and another ~0.5 inch ovoid erythematous-appearing blister which has burst with some serous  drainage, area moderately tender with surrounding erythema appearing improved from prior.   Skin:     General: Skin is warm and dry.      Coloration: Skin is not jaundiced.   Neurological:      General: No focal deficit present.      Mental Status: She is alert and oriented to person, place, and time. Mental status is at baseline.   Psychiatric:         Mood and Affect: Mood normal.         Behavior: Behavior normal.         Thought Content: Thought content normal.         Judgment: Judgment normal.         Pertinent Laboratory/Diagnostic Studies:  Laboratory and Imaging studies reviewed. Full report in the paper chart.     Current Medications   Medications reviewed and updated in facility chart.      -Total time spent on this encounter today including documentation and workup review, face to face time, history and exam, and documentation/orders, was approximately 25 minutes.  -This note will be copied to [facility] EMR where vitals and medication orders are placed.    Seb Chun D.O.  Geriatric Medicine  4/28/2025 10:18 AM

## 2025-04-28 NOTE — ASSESSMENT & PLAN NOTE
Recent finding of 2 small blister-like lesions at bottom surface of right lower extremity stump. With some local tenderness and surrounding erythema. Concerning for local soft tissue infection but not presently concerning for severe/deep infection. No systemic signs of infection.  Possibly related to pressure/shear force from andree lift strap. No other known/reported trauma to area recently.  completing keflex 500mg BID x7 days to cover for infection  wound care consulted and following  Wound culture was negative however was collected after antibiotic was initiated therefore limited utility, though useful to know that no resistant organisms are growing.  Have ordered for stump area to be bandaged/covered during andree transfers to prevent any rubbing/pressure as much as possible. This is being done and seems helpful  Continue to monitor closely. Appears to be gradually improving. If issue persists/worsens consider further workup including labs, imaging. Does not presently appears to require a higher level of care.

## 2025-04-28 NOTE — ASSESSMENT & PLAN NOTE
Lab Results   Component Value Date    HGBA1C 9.1 (A) 03/19/2025     Recent A1c 9.1 worsened  Encourage CCD  Monitor glucose routinely - recent fasting sugars have generally been mid-high 100s, not yet well controlled but overall better than before  Avoid hypoglycemia  Encourage lifestyle modifications including healthy diet, weight management, exercise as appropriate  Following Endocrine outpatient. Follow up Endocrine/Ophthalmology/Podiatry (at facility) as appropriate  ? Continue Lantus, will increase from 60 to 65u daily, titrate further as appropriate.  ? Recently changed from Trulicity to Ozempic per Endocrine. Has been titrated up to 0.5mg weekly, appears to be tolerating well  ? Follow up HgbA1c Q3 months July/Oct/Jan/April

## 2025-05-15 ENCOUNTER — OFFICE VISIT (OUTPATIENT)
Dept: HEMATOLOGY ONCOLOGY | Facility: CLINIC | Age: 65
End: 2025-05-15
Payer: COMMERCIAL

## 2025-05-15 VITALS
RESPIRATION RATE: 18 BRPM | OXYGEN SATURATION: 95 % | BODY MASS INDEX: 35.34 KG/M2 | HEIGHT: 63 IN | TEMPERATURE: 97.9 F | HEART RATE: 68 BPM | SYSTOLIC BLOOD PRESSURE: 128 MMHG | DIASTOLIC BLOOD PRESSURE: 74 MMHG

## 2025-05-15 DIAGNOSIS — D72.828 NEUTROPHILIA: Primary | ICD-10-CM

## 2025-05-15 PROCEDURE — 99214 OFFICE O/P EST MOD 30 MIN: CPT | Performed by: PHYSICIAN ASSISTANT

## 2025-05-15 NOTE — PROGRESS NOTES
Name: Dianna Varghese      : 1960      MRN: [id number]  Encounter Provider: Lilia Putnam PA-C  Encounter Date: 5/15/2025   Encounter department: [facility] [place]  :  Assessment & Plan  Neutrophilia  Likely secondary due to acute inflammation from uncontrolled diabetes/peripheral vascular disease.    CRP 19.7, sed rate 46    This is a 64-year-old female with past medical history of neutrophilia.  Last blood tests available for review were from 2025 that demonstrated a negative flow cytometry and a white blood cell count of just above 10 with an ANC of 8 just above the upper limit of normal.  Inflammatory markers were considerably high.  No other real abnormalities besides a mild normocytic anemia.  Recommendation is to continue with observation.  Patient will go for blood work again at her earliest convenience and then again in 6 months with blood work prior.  Since the patient does have a slight normocytic anemia and iron panel showed anemia of chronic inflammation additional iron studies would be necessary in order to assess underlying iron deficiency in the future.    Patient was appreciative of the call of her station today.  Understood the results of testing.    Orders:  •  CBC and differential; Future  •  CBC and differential; Future  •  Comprehensive metabolic panel; Future  •  Iron Panel (Includes Ferritin, Iron Sat%, Iron, and TIBC); Future  •  Ferritin; Future  •  Sedimentation rate, automated; Future  •  C-reactive protein; Future      Return in about 6 months (around 11/15/2025) for Labs, [facility] Office Visit.    History of Present Illness   Chief Complaint   Patient presents with   • Follow-up     Pertinent Medical History   This is a 64-year-old female with past medical history of bilateral above-the-knee amputations, CVA, type 2 diabetes uncontrolled, TIA, depression, peripheral vascular disease, hypertension, hyperlipidemia and GERD who  was referred to the hematology clinic at the request of her primary doctor secondary to leukocytosis/neutrophilia.    Laboratory assessment was serial lysed by Dr. Chun:  1/18/24: CBC with Hb 10.8;   Cr 1.43, eGFR 41 (recent baseline seems around 40s-60s); TSH 8.20  3/6/24: Hb 10.8; BMP generally stable,   Cr 1.41, eGFR 42; TSH 7.96  4/18/24: Hb 10.9;   Cr 1.51, eGFR 39; A1c 9.3; TSH WNL  6/3/24:  WBC 11.8, Hb 10.5    7/17/24: WBC 11.8, Hb 11.0;   Cr 1.43, eGFR 41; A1c 9.7    8/28/24: WBC 11.9, Hb 11.3  10/16/24: WBC 11.2, Hb 11.6;   Cr 0.99, eGFR 64; A1c 9.4  10/21/24: WBC 12.0, Hb 11.2,   Neutrophils- 8.3, hemoglobin A1c 9.4  12/12/24:  WBC 11.8, Hb 10.9    01/14/25: Patient notes below the knee amputation completed secondary to diabetes and complications.  Patient does note a history of peripheral vascular disease.  Patient states no family history of colon cancer no personal history of colon cancer.  Last colonoscopy approximately 5 years ago which was completed for investigation of Crohn's disease which was negative.    Patient has been living in a nursing home.  Diet is dictated by nursing home kitchen.  Patient normally eats what ever is given to her-including all fruits and vegetables.  Patient is not selective regarding any particular foods.    Patient denies recent hospitalization, surgery however she does admit to an upper respiratory cold.  1/28/2025: Creatinine 1.21, BUN 28, EGFR 50, calcium 8.5, iron saturation 16%, TIBC 295, no ferritin.  CBC 10.7, hemoglobin 10.8, MCV 84, RDW normal, platelet count 268, ANC 8.0 with the remainder of the differential WNL  Flow cytometry no immunophenotypic evidence of a clonal B-cell or T-cell population.  Sed rate 46, CRP 19.7    05/15/25: Patient is feeling well today no specific complaints does note that her hemoglobin A1c has been about at 9.  She notes that this is better than 13 but understands is not ideal.  Patient is on Ozempic but has gained weight.   "Patient takes morning insulin.  No other major changes.  Patient has been feeling the same.  Diet is poor.     Review of Systems   All other systems reviewed and are negative.          Objective   /74 (BP Location: Right arm, Patient Position: Sitting, Cuff Size: Adult)   Pulse 68   Temp 97.9 °F (36.6 °C) (Temporal)   Resp 18   Ht 5' 3\" (1.6 m)   SpO2 95%   BMI 35.34 kg/m²     Physical Exam  Constitutional:       General: She is not in acute distress.     Appearance: She is well-developed.      Comments: Presents in the transport gurney.   HENT:      Head: Normocephalic and atraumatic.     Eyes:      General: No scleral icterus.     Conjunctiva/sclera: Conjunctivae normal.       Cardiovascular:      Rate and Rhythm: Normal rate.   Pulmonary:      Effort: Pulmonary effort is normal. No respiratory distress.     Skin:     General: Skin is warm.      Coloration: Skin is not pale.      Findings: No rash.     Neurological:      Mental Status: She is alert and oriented to person, place, and time.     Psychiatric:         Thought Content: Thought content normal.         Labs: I have reviewed the following labs:  Results for orders placed or performed in visit on 03/19/25   POCT hemoglobin A1c   Result Value Ref Range    Hemoglobin A1C 9.1 (A) <=6.5     Administrative Statements     I have spent a total time of 36 minutes in caring for this patient on the day of the visit/encounter including Counseling / Coordination of care, Documenting in the medical record, Reviewing/placing orders in the medical record (including tests, medications, and/or procedures), and Obtaining or reviewing history  , not including the time spent for establishing the audio/video connection.  "

## 2025-05-21 ENCOUNTER — NURSING HOME VISIT (OUTPATIENT)
Dept: GERIATRICS | Facility: OTHER | Age: 65
End: 2025-05-21

## 2025-05-21 DIAGNOSIS — S78.119S: ICD-10-CM

## 2025-05-21 DIAGNOSIS — Z86.74 HISTORY OF CARDIAC ARREST: ICD-10-CM

## 2025-05-21 DIAGNOSIS — F32.A ANXIETY AND DEPRESSION: ICD-10-CM

## 2025-05-21 DIAGNOSIS — R94.31 PROLONGED Q-T INTERVAL ON ECG: ICD-10-CM

## 2025-05-21 DIAGNOSIS — D72.823 LEUKEMOID REACTION: ICD-10-CM

## 2025-05-21 DIAGNOSIS — E78.49 FAMILIAL COMBINED HYPERLIPIDEMIA: ICD-10-CM

## 2025-05-21 DIAGNOSIS — N18.30 CHRONIC RENAL INSUFFICIENCY, STAGE 3 (MODERATE) (HCC): ICD-10-CM

## 2025-05-21 DIAGNOSIS — E11.51 DM (DIABETES MELLITUS) TYPE II, CONTROLLED, WITH PERIPHERAL VASCULAR DISORDER (HCC): ICD-10-CM

## 2025-05-21 DIAGNOSIS — D50.8 IRON DEFICIENCY ANEMIA SECONDARY TO INADEQUATE DIETARY IRON INTAKE: ICD-10-CM

## 2025-05-21 DIAGNOSIS — Z89.612 S/P BILATERAL ABOVE KNEE AMPUTATION (HCC): ICD-10-CM

## 2025-05-21 DIAGNOSIS — L08.9 SOFT TISSUE INFECTION: Primary | ICD-10-CM

## 2025-05-21 DIAGNOSIS — I73.9 PAD (PERIPHERAL ARTERY DISEASE) (HCC): Chronic | ICD-10-CM

## 2025-05-21 DIAGNOSIS — E03.9 ACQUIRED HYPOTHYROIDISM: ICD-10-CM

## 2025-05-21 DIAGNOSIS — I10 PRIMARY HYPERTENSION: ICD-10-CM

## 2025-05-21 DIAGNOSIS — Z86.73 HISTORY OF CVA (CEREBROVASCULAR ACCIDENT): ICD-10-CM

## 2025-05-21 DIAGNOSIS — E21.3 HYPERPARATHYROIDISM (HCC): ICD-10-CM

## 2025-05-21 DIAGNOSIS — G54.6 PHANTOM PAIN AFTER AMPUTATION OF LOWER EXTREMITY (HCC): ICD-10-CM

## 2025-05-21 DIAGNOSIS — F41.9 ANXIETY AND DEPRESSION: ICD-10-CM

## 2025-05-21 DIAGNOSIS — K21.9 GASTROESOPHAGEAL REFLUX DISEASE, UNSPECIFIED WHETHER ESOPHAGITIS PRESENT: ICD-10-CM

## 2025-05-21 DIAGNOSIS — Z89.611 S/P BILATERAL ABOVE KNEE AMPUTATION (HCC): ICD-10-CM
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
## 2025-05-21 PROBLEM — E66.813 CLASS 3 SEVERE OBESITY DUE TO EXCESS CALORIES WITH SERIOUS COMORBIDITY AND BODY MASS INDEX (BMI) OF 40.0 TO 44.9 IN ADULT: Status: RESOLVED | Noted: 2018-02-24 | Resolved: 2025-05-21

## 2025-05-21 PROBLEM — M19.90 ARTHRITIS: Status: RESOLVED | Noted: 2019-01-31 | Resolved: 2025-05-21

## 2025-05-21 NOTE — ASSESSMENT & PLAN NOTE
Seems to be primary on chart review  Continue multivitamin/vit D  Monitor calcium on routine labs - has been stable/WNL recently  Following Endocrine outpatient   stated

## 2025-05-21 NOTE — PROGRESS NOTES
[facility]  Facility: [facility]    PROGRESS NOTE  Nursing Home Place of Service: nursing home place of service: POS 32 Unskilled- No Part A Coverage    NAME: Dianna Varghese  : 1960 AGE: 64 y.o. SEX: female MRN: [id number]  DATE OF ENCOUNTER: 2025    Assessment and Plan     Problem List Items Addressed This Visit       Esophageal reflux    -stable per patient  -continue PRN calcium carbonate. Pantoprazole recently discontinued and she seems stable without this.  -recommend OOB with meals, sit upright for at least 30 minutes afterwards, avoid trigger foods  -continue to monitor  -follow up with GI as appropriate/needed         DM (diabetes mellitus) type II, controlled, with peripheral vascular disorder (HCC)      Lab Results   Component Value Date    HGBA1C 9.1 (A) 2025     Recent A1c 9.1 worsened  Encourage CCD  Monitor glucose routinely - recent fasting sugars have generally been mid-high 100s, not yet well controlled but overall better than before  Avoid hypoglycemia  Encourage lifestyle modifications including healthy diet, weight management, exercise as appropriate  Following Endocrine outpatient. Next appt 2025  Follow up Endocrine/Ophthalmology/Podiatry (at facility) as appropriate  Continue Lantus, will increase from 65 to 70u daily, titrate further as appropriate. Consider splitting to BID dosing with further increased  Recently changed from Trulicity to Ozempic per Endocrine. Has been titrated up to 0.5mg weekly, appears to be tolerating well  One of her goals has been to reduce pill burden therefore preferring generally not to add further meds but to titrate existing ones  Follow up HgbA1c Q3 months July/Oct//April           Anxiety and depression    mood stable and well controlled overall. Mood tends to be down over winter/holidays. Gets down at times.  Psych following at facility  Supportive care  Continue sertraline 100 mg po daily at bedtime  Has  alprazolam 0.25mg daily PRN as well  (Was on quetiapine, has been weaned and discontinued as of April 2024 and appears tolerating well without it)  (Was on bupropion, has been discontinued as of Jan 2025 and appears stable without it)  Not yet ready to try weaning above regimen, continue to look for opportunities for GDR in conjunction with Psych         Hypertension    Monitor BP - generally stable, somewhat variable, around 120s-140s systolic, at times lower or higher  Pulse generally 60s-80s  Avoid hypotension  No acute cardiac complaints  Continue regimen including amlodipine 10mg daily, lisinopril 40mg daily, metoprolol tartrate 50mg BID, HCTZ 25mg daily, hydralazine 100mg TID, amiodarone 100mg daily  Consider weaning/adjusting extensive regimen as appropriate, especially if BP remains well controlled or becomes softer. For now seems stable on current regimen  Following Cardiology outpatient         Familial combined hyperlipidemia    Continue statin          Hyperparathyroidism (HCC)    Seems to be primary on chart review  Continue multivitamin/vit D  Monitor calcium on routine labs - has been stable/WNL recently  Following Endocrine outpatient         PAD (peripheral artery disease) (HCC) (Chronic)    S/p left AKA on 11/23/22  S/p right AKA stump revision on 01/27/23  Continue aspirin, statin, Eliquis  Working with rehab regarding prosthetics and power wheelchair, PT/OT as appropriate  Follow up with Vascular as appropriate         Anemia    Seems fairly mild, stable on lab review  -monitor on routine labs  -borderline microcytic and hx of low iron on lab review - continue ferrous sulfate PO qOD  -likely component of CKD as well  -monitor for acute bleed - no present signs  -consider further workup, Heme consult if persistent/worsening  -transfuse PRN Hb <7  -low threshold to hold anticoagulation if evidence of bleed or worsening Hb         History of CVA (cerebrovascular accident)    Pt had right CVA  11/14/2022  Continue aspirin, statin  Pt has been off plavix since 03/2023 after vascular visit         Prolonged Q-T interval on ECG    Noted on prior EKGs  No apparent acute/associated symptoms  Avoid QT prolonging medications as able  Following with Cardiology         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)    Bilateral AKA as above         S/P bilateral above knee amputation (HCC)    Working with PT/OT  Working with prosthetics  Working with power wheelchair training         Acquired hypothyroidism    Last TSH elevated from Feb 2024  Continue levothyroxine - recent increase to 62.5mcg daily, continue same at present as subsequent TSH improving as of April  No apparent acute/associated symptoms   Monitor TSH periodically; Adjust dosing as appropriate  next TSH check in June 2025  Patient is following Endocrine outpatient         History of cardiac arrest    S/p cardiac arrest on 1/18/2023 considered due to electrolyte abnormalities/severe sepsis  No recent/acute cardiac complaints  Continue cardiac regimen including aspirin, Eliquis, amiodarone, atorvastatin, beta blocker  Follow up with Cardiology as appropriate/yearly         Leukemoid reaction    Patient with persistent mildly elevated WBC/neutrophilia since around June 2024  Seems to have coincided with likely viral URI and associated diarrhea around that time however has not improved  Monitor on routine labs - seems fairly mild but persistent as of Dec 2024.  Monitor for acute infectious symptoms  Patient denies any unintentional weight loss, fevers, night sweats, chills, or other overt signs of systemic or malignant pathology.  Has established with Heme/Onc, issue considered likely secondary to inflammation and chronic medical conditions including diabetes, further workup pending         Phantom pain after amputation of lower extremity (HCC)    Phantom limb pains L>R lower extremity, intermittent, at random times but generally worse  overnight  Gabapentin recently titrated to 200mg BID, she thinks this has been very helpful, the phantom pains are much improved and no longer bothering her lately, she feels stable with current dosing  Continue to monitor - much improved/possibly resolved as no recent concerns of phantom pain.  Will trial decrease of gabapentin to 100mg BID         Chronic renal insufficiency, stage 3 (moderate) (HCC)    Lab Results   Component Value Date    EGFR 10 06/05/2023    EGFR 54 05/15/2023    EGFR 43 04/17/2023    CREATININE 1.30 06/05/2023    CREATININE 1.39 04/17/2023    CREATININE 1.31 04/13/2023       Baseline GFR seems consistent with CKD3a  Monitor renal function on routine labs - stable  Avoid nephrotoxins, NSAIDs as able  Encourage PO hydration, respecting volume status           Soft tissue infection - Primary    Ongoing right lower extremity stump wound, concern for lack of healing and with purulent drainage. No systemic signs of infection.  Possibly related to pressure/shear force from andree lift strap. No other known/reported trauma to area recently.  Wound care following at facility  Have ordered for stump area to be bandaged/covered during andree transfers to prevent any rubbing/pressure as much as possible. This is being done and seems helpful  Due to purulence, taking measures at this time including  -US RLE - concerning for abscess associated with the wound  -XR RLE to r/o osteomyelitis  -wound culture  -empiric keflex 500mg BID x7 days after wound culture collected  -continue topical wound care, silver alginate treatment  -have ordered outpatient wound care follow-up to consider I&D of abscess as can not be done at this facility per wound care team  -consider further workup, CT, pending the above  Continue to monitor closely.  Does not presently appears to require a higher level of care however if evidence of osteomyelitis or worsening infection may need to send out to hospital for further  eval/treatment                                        Chief Complaint     Follow up 30 day and f/u R stump wound    History of Present Illness     Dianna Varghese is a 64 y.o. female who was seen today for follow up. PMH including bilateral above knee amputations, cardiac arrest, type 2 DM, TIA, major depressive disorder, peripheral vascular disease, hypertension, hyperlipidemia, and GERD   Code Status: Full    Nursing concerned that patient's right leg stump wound appears overall worsening with purulent drainage.    Patient seen and examined in room  Others present: none  Patient laying in bed with head elevated  Appears comfortable, awake, alert, oriented to situation, able to converse appropriately  Very polite, Aox3, in good spirits, appears to be a reasonable historian, mentation appearing similar to my prior visit. She notes she feels well lately with no new concerns. Her R stump wound is not bothering her at rest though she notes ongoing tenderness to the bottom of the stump with pressure, this is stable, see plan. She does not feel any systemic symptoms such as fever/malaise/chills. The R leg otherwise appears grossly normal without other erythema/swelling.   Remains very pleased with her power wheelchair. Reports good stable appetite, no swallowing trouble, no abd pain/N/V, recently BM regular/normal/easy. Urinating well without acute issue. Breathing fine on room air, no orthopnea. No CP/palpitations including on exertion; does have some chronic stable dyspnea on exertion which is unchanged. Denies orthostatic lightheadedness. Sleeping well. Endorses mood stable, can be down at times, not feeling ready to wean psych regimen. No acute pain anywhere; she has previously had intermittent phantom limb pains L>R, generally worse overnight, for which gabapentin has been very helpful and the issue is much improved/resolved compared to previously.  She does not feel acutely sick or feverish or confused. No acute  "cardiopulmonary, abdominal, or urinary symptoms; see ROS for more details.        No further questions or acute concerns identified.  No further acute concerns per nursing. I extensively discussed patient's case and care plan with RNs Amanda and Anastacia.  Subsequently discussed case with wound care RN Rizwan as well     Lab Review:   1/18/24: CBC with Hb 10.8; CMP with Cr 1.43, eGFR 41 (recent baseline seems around 40s-60s); TSH 8.20, FT4 0.6; A1c 7.7  3/6/24: CBC with Hb 10.8; BMP generally stable, Cr 1.41, eGFR 42; TSH 7.96  4/18/24: CBC with Hb 10.9; CMP with Cr 1.51, eGFR 39; A1c 9.3; TSH WNL  6/3/24: CBC with WBC 11.8, Hb 10.5  6/10/24: CBC with WBC 12.3, Hb 10.5  6/17/24: CBC with WBC 12.1, Hb 10.5  6/26/24: CBC with WBC 10.7, Hb 10.6; BMP generally stable/non-actionable, Cr 1.08, GFR 58; Mg 1.7  7/17/24: CBC with WBC 11.8, Hb 11.0; CMP generally stable/non-actionable, Cr 1.43, eGFR 41; A1c 9.7  8/14/24: BMP with Cr 1.21, GFR 50  8/28/24: CBC with WBC 11.9, Hb 11.3  10/16/24: CBC with WBC 11.2, Hb 11.6; CMP generally stable/non-actionable, Cr 0.99, eGFR 64; A1c 9.4  10/21/24: CBC with WBC 12.0, Hb 11.2, manual diff with elevated neutrophils  12/12/24: CBC with WBC 11.8, Hb 10.9  1/9/25: respiratory viral panel negative  1/15/25: CBC with WBC 13.6, Hb 10.7; CMP with Cr 1.25, GFR 48; A1c 8.3  1/28/25: CBC with WBC 10.7, Hb 10.8; CMP with Cr 1.21, GFR 50; ESR 46; CRP 19.7; iron 46; flow cytometry \"There is no immunophenotypic evidence of a clonal B or T-cell population or increase in CD34+ blasts.\"  2/14-19/25: TSH 11.55, fT4 WNL  4/1/25: TSH 7.38, fT4 WNL  4/16/25: CBC with WBC 13.7, Hb 10.5; CMP with Cr 1.18, GFR 52; A1c 9.1  4/23/25: R stump wound culture \"No Neutrophils noted.No organisms observed. CULTURE RESULTS: NO GROWTH\"  4/29/25: CBC with WBC 12.6, Hb 10.7         US right leg 5/21/25 \"FINDINGS: Soft tissue ultrasound. Region of concern: Right leg amputation site. There is a suggestion of a complex " "collection, 2.4 x 1.3 x 2.1 cm. Possibilities include hematoma and abscess.  CONCLUSION: Suggestion of a complex collection. Consider CT for further delineation.\"       EKG Jan 2023: NSR, 91bpm, Qtc 511  LEON Jan 2023: EF 60, \"No vegetations or other evidence of endocarditis seen \"       The following portions of the patient's history were reviewed and updated as appropriate: allergies, current medications, past family history, past medical history, past social history, past surgical history and problem list.    Review of Systems     Review of Systems   Constitutional:  Negative for appetite change, chills, diaphoresis, fatigue and fever.   HENT:  Negative for congestion, drooling, ear pain, hearing loss, rhinorrhea, sore throat and trouble swallowing.    Eyes:  Negative for pain, discharge, redness, itching and visual disturbance.   Respiratory:  Negative for cough, chest tightness, shortness of breath and wheezing.    Cardiovascular:  Negative for chest pain, palpitations and leg swelling.   Gastrointestinal:  Negative for abdominal pain, blood in stool, constipation, diarrhea, nausea and vomiting.   Genitourinary:  Negative for difficulty urinating, dysuria, flank pain and hematuria.   Musculoskeletal:  Positive for gait problem. Negative for arthralgias, back pain and neck pain.   Skin:  Positive for wound. Negative for color change.   Neurological:  Negative for dizziness, facial asymmetry, speech difficulty, weakness, light-headedness and headaches.   Psychiatric/Behavioral:  Negative for agitation, behavioral problems and confusion. The patient is not nervous/anxious and is not hyperactive.    All other systems reviewed and are negative.      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 (HCC)    Hyperparathyroidism (HCC)    PAD (peripheral artery disease) (HCC)    Anemia    HIT (heparin-induced thrombocytopenia) (HCC)    Mild protein-calorie malnutrition (HCC)    History of CVA (cerebrovascular accident)    Hypomagnesemia    Prolonged Q-T interval on ECG    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)    S/P bilateral above knee amputation (HCC)    Acquired hypothyroidism    History of cardiac arrest    Advance care planning    Leukemoid reaction    Phantom pain after amputation of lower extremity (HCC)    Polypharmacy    Chronic renal insufficiency, stage 3 (moderate) (HCC)    Primary open angle glaucoma (POAG) of left eye, mild stage    Soft tissue infection       Objective     Vital Signs:     BP: 122/70 mmHg  5/14/2025 16:39 Temp:98.7 °F  4/28/2025 00:44 Pulse:70 bpm  5/21/2025 09:58 Weight:206.5 Lbs  5/6/2025 22:27   Resp:17 Breaths/min  4/28/2025 00:44 BS:236 mg/dL  5/21/2025 09:55 O2:95 %  4/28/2025 00:44 Pain:0  5/21/2025 07:50       Physical Exam  Vitals reviewed.   Constitutional:       General: She is not in acute distress.     Appearance: She is obese. She is not toxic-appearing or diaphoretic.   HENT:      Head: Normocephalic and atraumatic.      Right Ear: External ear normal.      Left Ear: External ear normal.      Nose: Nose normal. No rhinorrhea.      Mouth/Throat:      Mouth: Mucous membranes are dry.      Pharynx: Oropharynx is clear. No posterior oropharyngeal erythema.     Eyes:      General: No scleral icterus.        Right eye: No discharge.         Left eye: No discharge.      Extraocular Movements: Extraocular movements intact.      Conjunctiva/sclera: Conjunctivae normal.      Pupils: Pupils are equal, round, and reactive to light.        Cardiovascular:      Rate and Rhythm: Normal rate and regular rhythm.   Pulmonary:      Effort: Pulmonary effort is normal. No respiratory distress.      Breath sounds: Normal breath sounds. No wheezing or rales.   Abdominal:      General: Bowel sounds are normal.      Palpations: Abdomen is soft.      Tenderness: There is no abdominal tenderness. There is no guarding.     Musculoskeletal:      Cervical back: No rigidity.      Comments: Bilateral AKA, sites appear well healed  Bottom surface of right leg stump noted to have one ~0.5 inch wound with purulent drainage. No notable surrounding erythema. Some tenderness at infero-medial aspect of right stump. No appreciable local edema.     Skin:     General: Skin is warm and dry.      Coloration: Skin is not jaundiced.     Neurological:      General: No focal deficit present.      Mental Status: She is alert and oriented to person, place, and time. Mental status is at baseline.     Psychiatric:         Mood and Affect: Mood normal.         Behavior: Behavior normal.         Thought Content: Thought content normal.         Judgment: Judgment normal.         Pertinent Laboratory/Diagnostic Studies:  Laboratory and Imaging studies reviewed. Full report in the paper chart.     Current Medications   Medications reviewed and updated in facility chart.      -Total time spent on this encounter today including documentation and workup review, face to face time, history and exam, and documentation/orders, was approximately 60 minutes.  -This note will be copied to [facility] EMR where vitals and medication orders are placed.    Seb Chun D.O.  Geriatric Medicine  5/21/2025 2:31 PM

## 2025-05-21 NOTE — ASSESSMENT & PLAN NOTE
Ongoing right lower extremity stump wound, concern for lack of healing and with purulent drainage. No systemic signs of infection.  Possibly related to pressure/shear force from andree lift strap. No other known/reported trauma to area recently.  Wound care following at facility  Have ordered for stump area to be bandaged/covered during andree transfers to prevent any rubbing/pressure as much as possible. This is being done and seems helpful  Due to purulence, taking measures at this time including  -US RLE - concerning for abscess associated with the wound  -XR RLE to r/o osteomyelitis  -wound culture  -empiric keflex 500mg BID x7 days after wound culture collected  -continue topical wound care, silver alginate treatment  -have ordered outpatient wound care follow-up to consider I&D of abscess as can not be done at this facility per wound care team  -consider further workup, CT, pending the above  Continue to monitor closely.  Does not presently appears to require a higher level of care however if evidence of osteomyelitis or worsening infection may need to send out to hospital for further eval/treatment

## 2025-05-21 NOTE — ASSESSMENT & PLAN NOTE
Patient with persistent mildly elevated WBC/neutrophilia since around June 2024  Seems to have coincided with likely viral URI and associated diarrhea around that time however has not improved  Monitor on routine labs - seems fairly mild but persistent as of Dec 2024.  Monitor for acute infectious symptoms  Patient denies any unintentional weight loss, fevers, night sweats, chills, or other overt signs of systemic or malignant pathology.  Has established with Heme/Onc, issue considered likely secondary to inflammation and chronic medical conditions including diabetes, further workup pending

## 2025-05-21 NOTE — ASSESSMENT & PLAN NOTE
Lab Results   Component Value Date    HGBA1C 9.1 (A) 03/19/2025     Recent A1c 9.1 worsened  Encourage CCD  Monitor glucose routinely - recent fasting sugars have generally been mid-high 100s, not yet well controlled but overall better than before  Avoid hypoglycemia  Encourage lifestyle modifications including healthy diet, weight management, exercise as appropriate  Following Endocrine outpatient. Next appt June 2025  Follow up Endocrine/Ophthalmology/Podiatry (at facility) as appropriate  Continue Lantus, will increase from 65 to 70u daily, titrate further as appropriate. Consider splitting to BID dosing with further increased  Recently changed from Trulicity to Ozempic per Endocrine. Has been titrated up to 0.5mg weekly, appears to be tolerating well  One of her goals has been to reduce pill burden therefore preferring generally not to add further meds but to titrate existing ones  Follow up HgbA1c Q3 months July/Oct/Jan/April

## 2025-05-21 NOTE — ASSESSMENT & PLAN NOTE
Phantom limb pains L>R lower extremity, intermittent, at random times but generally worse overnight  Gabapentin recently titrated to 200mg BID, she thinks this has been very helpful, the phantom pains are much improved and no longer bothering her lately, she feels stable with current dosing  Continue to monitor - much improved/possibly resolved as no recent concerns of phantom pain.  Will trial decrease of gabapentin to 100mg BID

## 2025-05-21 NOTE — ASSESSMENT & PLAN NOTE
mood stable and well controlled overall. Mood tends to be down over winter/holidays. Gets down at times.  Psych following at facility  Supportive care  Continue sertraline 100 mg po daily at bedtime  Has alprazolam 0.25mg daily PRN as well  (Was on quetiapine, has been weaned and discontinued as of April 2024 and appears tolerating well without it)  (Was on bupropion, has been discontinued as of Jan 2025 and appears stable without it)  Not yet ready to try weaning above regimen, continue to look for opportunities for GDR in conjunction with Psych

## 2025-05-22 ENCOUNTER — APPOINTMENT (EMERGENCY)
Dept: RADIOLOGY | Facility: HOSPITAL | Age: 65
DRG: 349 | End: 2025-05-22
Payer: COMMERCIAL

## 2025-05-22 ENCOUNTER — HOSPITAL ENCOUNTER (INPATIENT)
Facility: HOSPITAL | Age: 65
LOS: 6 days | Discharge: NURSING, REHAB OR LONG-TERM CARE | DRG: 349 | End: 2025-05-28
Attending: EMERGENCY MEDICINE | Admitting: SURGERY
Payer: COMMERCIAL

## 2025-05-22 ENCOUNTER — APPOINTMENT (EMERGENCY)
Dept: CT IMAGING | Facility: HOSPITAL | Age: 65
DRG: 349 | End: 2025-05-22
Payer: COMMERCIAL

## 2025-05-22 DIAGNOSIS — G62.9 NEUROPATHY: ICD-10-CM

## 2025-05-22 DIAGNOSIS — B37.9 CANDIDIASIS: ICD-10-CM

## 2025-05-22 DIAGNOSIS — M86.9 OSTEOMYELITIS (HCC): ICD-10-CM

## 2025-05-22 DIAGNOSIS — T87.89 NON-HEALING WOUND OF AMPUTATION STUMP (HCC): Primary | ICD-10-CM

## 2025-05-22 PROBLEM — T87.9 COMPLICATION OF AMPUTATED STUMP (HCC): Status: ACTIVE | Noted: 2025-05-22

## 2025-05-22 LAB
ALBUMIN SERPL BCG-MCNC: 3.5 G/DL (ref 3.5–5)
ALP SERPL-CCNC: 74 U/L (ref 34–104)
ALT SERPL W P-5'-P-CCNC: 36 U/L (ref 7–52)
ANION GAP SERPL CALCULATED.3IONS-SCNC: 8 MMOL/L (ref 4–13)
APTT PPP: 35 SECONDS (ref 23–34)
AST SERPL W P-5'-P-CCNC: 30 U/L (ref 13–39)
BASOPHILS # BLD AUTO: 0.07 THOUSANDS/ÂΜL (ref 0–0.1)
BASOPHILS NFR BLD AUTO: 1 % (ref 0–1)
BILIRUB SERPL-MCNC: 0.31 MG/DL (ref 0.2–1)
BUN SERPL-MCNC: 24 MG/DL (ref 5–25)
CALCIUM SERPL-MCNC: 9 MG/DL (ref 8.4–10.2)
CHLORIDE SERPL-SCNC: 103 MMOL/L (ref 96–108)
CO2 SERPL-SCNC: 25 MMOL/L (ref 21–32)
CREAT SERPL-MCNC: 1.18 MG/DL (ref 0.6–1.3)
EOSINOPHIL # BLD AUTO: 0.39 THOUSAND/ÂΜL (ref 0–0.61)
EOSINOPHIL NFR BLD AUTO: 3 % (ref 0–6)
ERYTHROCYTE [DISTWIDTH] IN BLOOD BY AUTOMATED COUNT: 13.9 % (ref 11.6–15.1)
GFR SERPL CREATININE-BSD FRML MDRD: 49 ML/MIN/1.73SQ M
GLUCOSE SERPL-MCNC: 196 MG/DL (ref 65–140)
GLUCOSE SERPL-MCNC: 230 MG/DL (ref 65–140)
GLUCOSE SERPL-MCNC: 91 MG/DL (ref 65–140)
HCT VFR BLD AUTO: 35.6 % (ref 34.8–46.1)
HGB BLD-MCNC: 11.4 G/DL (ref 11.5–15.4)
IMM GRANULOCYTES # BLD AUTO: 0.09 THOUSAND/UL (ref 0–0.2)
IMM GRANULOCYTES NFR BLD AUTO: 1 % (ref 0–2)
INR PPP: 1.47 (ref 0.85–1.19)
LACTATE SERPL-SCNC: 0.9 MMOL/L (ref 0.5–2)
LYMPHOCYTES # BLD AUTO: 1.64 THOUSANDS/ÂΜL (ref 0.6–4.47)
LYMPHOCYTES NFR BLD AUTO: 12 % (ref 14–44)
MCH RBC QN AUTO: 27 PG (ref 26.8–34.3)
MCHC RBC AUTO-ENTMCNC: 32 G/DL (ref 31.4–37.4)
MCV RBC AUTO: 84 FL (ref 82–98)
MONOCYTES # BLD AUTO: 0.68 THOUSAND/ÂΜL (ref 0.17–1.22)
MONOCYTES NFR BLD AUTO: 5 % (ref 4–12)
NEUTROPHILS # BLD AUTO: 10.31 THOUSANDS/ÂΜL (ref 1.85–7.62)
NEUTS SEG NFR BLD AUTO: 78 % (ref 43–75)
NRBC BLD AUTO-RTO: 0 /100 WBCS
PLATELET # BLD AUTO: 296 THOUSANDS/UL (ref 149–390)
PMV BLD AUTO: 9.2 FL (ref 8.9–12.7)
POTASSIUM SERPL-SCNC: 4.9 MMOL/L (ref 3.5–5.3)
PROT SERPL-MCNC: 7.2 G/DL (ref 6.4–8.4)
PROTHROMBIN TIME: 17.9 SECONDS (ref 12.3–15)
RBC # BLD AUTO: 4.23 MILLION/UL (ref 3.81–5.12)
SODIUM SERPL-SCNC: 136 MMOL/L (ref 135–147)
WBC # BLD AUTO: 13.18 THOUSAND/UL (ref 4.31–10.16)

## 2025-05-22 PROCEDURE — 72193 CT PELVIS W/DYE: CPT

## 2025-05-22 PROCEDURE — 87040 BLOOD CULTURE FOR BACTERIA: CPT | Performed by: PHYSICIAN ASSISTANT

## 2025-05-22 PROCEDURE — 99223 1ST HOSP IP/OBS HIGH 75: CPT | Performed by: STUDENT IN AN ORGANIZED HEALTH CARE EDUCATION/TRAINING PROGRAM

## 2025-05-22 PROCEDURE — 85025 COMPLETE CBC W/AUTO DIFF WBC: CPT | Performed by: PHYSICIAN ASSISTANT

## 2025-05-22 PROCEDURE — 99284 EMERGENCY DEPT VISIT MOD MDM: CPT

## 2025-05-22 PROCEDURE — 87081 CULTURE SCREEN ONLY: CPT | Performed by: STUDENT IN AN ORGANIZED HEALTH CARE EDUCATION/TRAINING PROGRAM

## 2025-05-22 PROCEDURE — 87070 CULTURE OTHR SPECIMN AEROBIC: CPT | Performed by: PHYSICIAN ASSISTANT

## 2025-05-22 PROCEDURE — 85610 PROTHROMBIN TIME: CPT | Performed by: PHYSICIAN ASSISTANT

## 2025-05-22 PROCEDURE — 87077 CULTURE AEROBIC IDENTIFY: CPT | Performed by: PHYSICIAN ASSISTANT

## 2025-05-22 PROCEDURE — 85730 THROMBOPLASTIN TIME PARTIAL: CPT | Performed by: PHYSICIAN ASSISTANT

## 2025-05-22 PROCEDURE — 36415 COLL VENOUS BLD VENIPUNCTURE: CPT | Performed by: PHYSICIAN ASSISTANT

## 2025-05-22 PROCEDURE — 73552 X-RAY EXAM OF FEMUR 2/>: CPT

## 2025-05-22 PROCEDURE — 82948 REAGENT STRIP/BLOOD GLUCOSE: CPT

## 2025-05-22 PROCEDURE — 80053 COMPREHEN METABOLIC PANEL: CPT | Performed by: PHYSICIAN ASSISTANT

## 2025-05-22 PROCEDURE — 87205 SMEAR GRAM STAIN: CPT | Performed by: PHYSICIAN ASSISTANT

## 2025-05-22 PROCEDURE — 87186 SC STD MICRODIL/AGAR DIL: CPT | Performed by: PHYSICIAN ASSISTANT

## 2025-05-22 PROCEDURE — 83605 ASSAY OF LACTIC ACID: CPT | Performed by: PHYSICIAN ASSISTANT

## 2025-05-22 PROCEDURE — 99285 EMERGENCY DEPT VISIT HI MDM: CPT | Performed by: PHYSICIAN ASSISTANT

## 2025-05-22 RX ORDER — ALPRAZOLAM 0.25 MG
0.25 TABLET ORAL DAILY PRN
Status: DISCONTINUED | OUTPATIENT
Start: 2025-05-22 | End: 2025-05-28 | Stop reason: HOSPADM

## 2025-05-22 RX ORDER — SACCHAROMYCES BOULARDII 250 MG
250 CAPSULE ORAL 2 TIMES DAILY
Status: DISCONTINUED | OUTPATIENT
Start: 2025-05-22 | End: 2025-05-28 | Stop reason: HOSPADM

## 2025-05-22 RX ORDER — CALCIUM CARBONATE 500 MG/1
1000 TABLET, CHEWABLE ORAL EVERY 6 HOURS PRN
Status: DISCONTINUED | OUTPATIENT
Start: 2025-05-22 | End: 2025-05-28 | Stop reason: HOSPADM

## 2025-05-22 RX ORDER — LISINOPRIL 20 MG/1
40 TABLET ORAL DAILY
Status: DISCONTINUED | OUTPATIENT
Start: 2025-05-23 | End: 2025-05-28 | Stop reason: HOSPADM

## 2025-05-22 RX ORDER — INSULIN LISPRO 100 [IU]/ML
1-6 INJECTION, SOLUTION INTRAVENOUS; SUBCUTANEOUS
Status: DISCONTINUED | OUTPATIENT
Start: 2025-05-22 | End: 2025-05-23

## 2025-05-22 RX ORDER — OXYMETAZOLINE HYDROCHLORIDE 0.05 G/100ML
2 SPRAY NASAL EVERY 12 HOURS PRN
Status: DISCONTINUED | OUTPATIENT
Start: 2025-05-22 | End: 2025-05-22

## 2025-05-22 RX ORDER — METOPROLOL TARTRATE 50 MG
50 TABLET ORAL EVERY 12 HOURS SCHEDULED
Status: DISCONTINUED | OUTPATIENT
Start: 2025-05-22 | End: 2025-05-28 | Stop reason: HOSPADM

## 2025-05-22 RX ORDER — AMIODARONE HYDROCHLORIDE 100 MG/1
100 TABLET ORAL DAILY
Status: DISCONTINUED | OUTPATIENT
Start: 2025-05-23 | End: 2025-05-28 | Stop reason: HOSPADM

## 2025-05-22 RX ORDER — ACETAMINOPHEN 325 MG/1
650 TABLET ORAL EVERY 4 HOURS PRN
Status: DISCONTINUED | OUTPATIENT
Start: 2025-05-22 | End: 2025-05-28 | Stop reason: HOSPADM

## 2025-05-22 RX ORDER — POLYETHYLENE GLYCOL 3350 17 G/17G
17 POWDER, FOR SOLUTION ORAL DAILY PRN
Status: DISCONTINUED | OUTPATIENT
Start: 2025-05-22 | End: 2025-05-28 | Stop reason: HOSPADM

## 2025-05-22 RX ORDER — INSULIN GLARGINE 100 [IU]/ML
50 INJECTION, SOLUTION SUBCUTANEOUS EVERY MORNING
Status: DISCONTINUED | OUTPATIENT
Start: 2025-05-23 | End: 2025-05-28 | Stop reason: HOSPADM

## 2025-05-22 RX ORDER — GABAPENTIN 100 MG/1
200 CAPSULE ORAL 2 TIMES DAILY
Status: DISCONTINUED | OUTPATIENT
Start: 2025-05-22 | End: 2025-05-28 | Stop reason: HOSPADM

## 2025-05-22 RX ORDER — AMLODIPINE BESYLATE 10 MG/1
10 TABLET ORAL DAILY
Status: DISCONTINUED | OUTPATIENT
Start: 2025-05-23 | End: 2025-05-28 | Stop reason: HOSPADM

## 2025-05-22 RX ORDER — LEVOTHYROXINE SODIUM 125 UG/1
62.5 TABLET ORAL
Status: DISCONTINUED | OUTPATIENT
Start: 2025-05-23 | End: 2025-05-28 | Stop reason: HOSPADM

## 2025-05-22 RX ORDER — ASPIRIN 81 MG/1
81 TABLET, CHEWABLE ORAL DAILY
Status: DISCONTINUED | OUTPATIENT
Start: 2025-05-23 | End: 2025-05-28 | Stop reason: HOSPADM

## 2025-05-22 RX ORDER — HYDRALAZINE HYDROCHLORIDE 50 MG/1
100 TABLET, FILM COATED ORAL EVERY 8 HOURS SCHEDULED
Status: DISCONTINUED | OUTPATIENT
Start: 2025-05-22 | End: 2025-05-28 | Stop reason: HOSPADM

## 2025-05-22 RX ORDER — INSULIN GLARGINE 100 [IU]/ML
50 INJECTION, SOLUTION SUBCUTANEOUS
Status: DISCONTINUED | OUTPATIENT
Start: 2025-05-22 | End: 2025-05-22

## 2025-05-22 RX ORDER — ALPRAZOLAM 0.25 MG
0.25 TABLET ORAL DAILY PRN
COMMUNITY

## 2025-05-22 RX ORDER — SERTRALINE HYDROCHLORIDE 100 MG/1
100 TABLET, FILM COATED ORAL DAILY
Status: DISCONTINUED | OUTPATIENT
Start: 2025-05-23 | End: 2025-05-28 | Stop reason: HOSPADM

## 2025-05-22 RX ORDER — FERROUS SULFATE 325(65) MG
325 TABLET ORAL
Status: DISCONTINUED | OUTPATIENT
Start: 2025-05-23 | End: 2025-05-28 | Stop reason: HOSPADM

## 2025-05-22 RX ORDER — HYDROCHLOROTHIAZIDE 25 MG/1
25 TABLET ORAL DAILY
Status: DISCONTINUED | OUTPATIENT
Start: 2025-05-23 | End: 2025-05-28 | Stop reason: HOSPADM

## 2025-05-22 RX ORDER — ATORVASTATIN CALCIUM 40 MG/1
40 TABLET, FILM COATED ORAL DAILY
Status: DISCONTINUED | OUTPATIENT
Start: 2025-05-23 | End: 2025-05-28 | Stop reason: HOSPADM

## 2025-05-22 RX ORDER — LATANOPROST 50 UG/ML
1 SOLUTION/ DROPS OPHTHALMIC
Status: DISCONTINUED | OUTPATIENT
Start: 2025-05-22 | End: 2025-05-28 | Stop reason: HOSPADM

## 2025-05-22 RX ADMIN — HYDRALAZINE HYDROCHLORIDE 100 MG: 50 TABLET ORAL at 16:43

## 2025-05-22 RX ADMIN — GABAPENTIN 200 MG: 100 CAPSULE ORAL at 21:59

## 2025-05-22 RX ADMIN — HYDRALAZINE HYDROCHLORIDE 100 MG: 50 TABLET ORAL at 21:59

## 2025-05-22 RX ADMIN — Medication 250 MG: at 21:59

## 2025-05-22 RX ADMIN — APIXABAN 5 MG: 5 TABLET, FILM COATED ORAL at 21:59

## 2025-05-22 RX ADMIN — METOPROLOL TARTRATE 50 MG: 50 TABLET, FILM COATED ORAL at 21:59

## 2025-05-22 RX ADMIN — IOHEXOL 100 ML: 350 INJECTION, SOLUTION INTRAVENOUS at 13:36

## 2025-05-22 RX ADMIN — LATANOPROST 1 DROP: 50 SOLUTION OPHTHALMIC at 22:01

## 2025-05-22 NOTE — ASSESSMENT & PLAN NOTE
Patient is a 64-year-old female, former smoker, with PMH HTN, HLD, type II DM, hyperparathyroidism, CVA, CKD 3, and PAD s/p bilateral AKA.  Patient presented to [facility] from [facility] due to right AKA stump wound. Vascular surgery is consulted for evaluation of right AKA stump wound.    Imaging:  Right femur xray 5/22/2025:  Status post above-knee amputation without radiographic evidence of osteomyelitis at this time.   CT pelvis 5/22/2025:  There is a thick-walled enhancing tract extending from the anterior medial aspect of the right lower extremity stump wound, into the distal right femoral stump. There is also a lucent tortuous channel extending retrograde within the right femur. This is consistent with interval worsening/progression of the chronic pyogenic osteomyelitis.    Plan:  -Bilateral AKA with nonhealing right AKA stump wound  -CT notes worsening/progression of chronic pyogenic osteomyelitis of right femur with probing wound  -Patient will likely require right AKA revision, timing TBD  -Recommend admission with consult to ID as patient will likely require prolonged antibiotic course.  -Daily wound care  -Continue ASA and atorvastatin  -Strict blood glucose control for optimal wound healing  -D/w Dr. Baldwin

## 2025-05-22 NOTE — ASSESSMENT & PLAN NOTE
Continue as needed calcium carbonate.  Pantoprazole currently not in her medication list it appears that this has been recently discontinued as per documentation.

## 2025-05-22 NOTE — PLAN OF CARE
Problem: Potential for Falls  Goal: Patient will remain free of falls  Description: INTERVENTIONS:  - Educate patient/family on patient safety including physical limitations  - Instruct patient to call for assistance with activity   - Consider consulting OT/PT to assist with strengthening/mobility based on AM PAC & JH-HLM score  - Consult OT/PT to assist with strengthening/mobility   - Keep Call bell within reach  - Keep bed low and locked with side rails adjusted as appropriate  - Keep care items and personal belongings within reach  - Initiate and maintain comfort rounds  - Make Fall Risk Sign visible to staff  - Offer Toileting every 2 Hours, in advance of need  - Initiate/Maintain BED alarm  - Apply yellow socks and bracelet for high fall risk patients  - Consider moving patient to room near nurses station  Outcome: Progressing     Problem: Prexisting or High Potential for Compromised Skin Integrity  Goal: Skin integrity is maintained or improved  Description: INTERVENTIONS:  - Identify patients at risk for skin breakdown  - Assess and monitor skin integrity including under and around medical devices   - Assess and monitor nutrition and hydration status  - Monitor labs  - Assess for incontinence   - Turn and reposition patient  - Assist with mobility/ambulation  - Relieve pressure over lesly prominences   - Avoid friction and shearing  - Provide appropriate hygiene as needed including keeping skin clean and dry  - Evaluate need for skin moisturizer/barrier cream  - Collaborate with interdisciplinary team  - Patient/family teaching  - Consider wound care consult    Assess:  - Review Nicanor scale daily  - Inspect skin when repositioning, toileting, and assisting with ADLS  Outcome: Progressing     Problem: PAIN - ADULT  Goal: Verbalizes/displays adequate comfort level or baseline comfort level  Description: Interventions:  - Encourage patient to monitor pain and request assistance  - Assess pain using  appropriate pain scale  - Administer analgesics as ordered based on type and severity of pain and evaluate response  - Implement non-pharmacological measures as appropriate and evaluate response  - Consider cultural and social influences on pain and pain management  - Notify physician/advanced practitioner if interventions unsuccessful or patient reports new pain  - Educate patient/family on pain management process including their role and importance of  reporting pain   - Provide non-pharmacologic/complimentary pain relief interventions  Outcome: Progressing     Problem: INFECTION - ADULT  Goal: Absence or prevention of progression during hospitalization  Description: INTERVENTIONS:  - Assess and monitor for signs and symptoms of infection  - Monitor lab/diagnostic results  - Monitor all insertion sites, i.e. indwelling lines, tubes, and drains  - Monitor endotracheal if appropriate and nasal secretions for changes in amount and color  - [place] appropriate cooling/warming therapies per order  - Administer medications as ordered  - Instruct and encourage patient and family to use good hand hygiene technique  - Identify and instruct in appropriate isolation precautions for identified infection/condition  Outcome: Progressing  Goal: Absence of fever/infection during neutropenic period  Description: INTERVENTIONS:  - Monitor WBC  - Perform strict hand hygiene  - Limit to healthy visitors only  - No plants, dried, fresh or silk flowers with hopkins in patient room  Outcome: Progressing     Problem: SAFETY ADULT  Goal: Maintain or return to baseline ADL function  Description: INTERVENTIONS:  -  Assess patient's ability to carry out ADLs; assess patient's baseline for ADL function and identify physical deficits which impact ability to perform ADLs (bathing, care of mouth/teeth, toileting, grooming, dressing, etc.)  - Assess/evaluate cause of self-care deficits   - Assess range of motion  - Assess patient's mobility;  develop plan if impaired  - Assess patient's need for assistive devices and provide as appropriate  - Encourage maximum independence but intervene and supervise when necessary  - Involve family in performance of ADLs  - Assess for home care needs following discharge   - Consider OT consult to assist with ADL evaluation and planning for discharge  - Provide patient education as appropriate  - Monitor functional capacity and physical performance, use of AM PAC & JH-HLM   - Monitor gait, balance and fatigue with ambulation    Outcome: Progressing     Problem: DISCHARGE PLANNING  Goal: Discharge to home or other facility with appropriate resources  Description: INTERVENTIONS:  - Identify barriers to discharge w/patient and caregiver  - Arrange for needed discharge resources and transportation as appropriate  - Identify discharge learning needs (meds, wound care, etc.)  - Arrange for interpretive services to assist at discharge as needed  - Refer to Case Management Department for coordinating discharge planning if the patient needs post-hospital services based on physician/advanced practitioner order or complex needs related to functional status, cognitive ability, or social support system  Outcome: Progressing     Problem: Knowledge Deficit  Goal: Patient/family/caregiver demonstrates understanding of disease process, treatment plan, medications, and discharge instructions  Description: Complete learning assessment and assess knowledge base.  Interventions:  - Provide teaching at level of understanding  - Provide teaching via preferred learning methods  Outcome: Progressing

## 2025-05-22 NOTE — ASSESSMENT & PLAN NOTE
64-year-old female with a history of hypertension, hyperlipidemia, type 2 diabetes mellitus, CKD 3, CVA, and PAD status post bilateral AKA who was sent in here from [facility] due to right AKA stump wound.  Per vascular surgery recommendations appreciated.  They are planning amputation next Wednesday.  Please hold Eliquis Sunday evening in anticipation for this.  Patient received Keflex yesterday, since she is not septic vascular surgery recommends that we monitor her off antibiotics for the time being and involve infectious disease in the management of her care.  Initiate broad-spectrum antibiotic therapy should she become septic  Await cultures

## 2025-05-22 NOTE — ASSESSMENT & PLAN NOTE
Status post left AKA 11/2022  Status post right AKA stump revision 1/2023  Continue aspirin, statin, Eliquis (On Eliquis for PAD / stroke. Of note, patient had a prior history of HIT)

## 2025-05-22 NOTE — SEPSIS NOTE
Sepsis Note   Dianna Varghese 64 y.o. female MRN: [id number]  Unit/Bed#: ED-42 Encounter: [id number]       Initial Sepsis Screening       Row Name 05/22/25 1518                Is the patient's history suggestive of a new or worsening infection? Yes (Proceed)  -        Suspected source of infection soft tissue;infected joint  -AH        Indicate SIRS criteria --        Are two or more of the above signs & symptoms of infection both present and new to the patient? No  -                  User Key  (r) = Recorded By, (t) = Taken By, (c) = Cosigned By      Initials Name Provider Type    JAMEEL Corbin PA-C Physician Assistant                   Initial Sepsis Screening       Row Name 05/22/25 1518                   Initial Sepsis Assessment    Is the patient's history suggestive of a new or worsening infection? Yes (Proceed)  -        Suspected source of infection soft tissue;infected joint  -AH        Are two or more of the above signs & symptoms of infection both present and new to the patient? No  -                  User Key  (r) = Recorded By, (t) = Taken By, (c) = Cosigned By      Initials Name Provider Type    JAMEEL Corbin PA-C Physician Assistant                         Body mass index is 38.39 kg/m².  Wt Readings from Last 1 Encounters:   05/22/25 98.3 kg (216 lb 11.4 oz)        Ideal body weight: 52.4 kg (115 lb 8.3 oz)  Adjusted ideal body weight: 70.8 kg (156 lb)

## 2025-05-22 NOTE — CONSULTS
Consultation - Vascular Surgery   Name: Dianna Varghese 64 y.o. female I MRN: [id number]  Unit/Bed#: ED-42 I Date of Admission: 5/22/2025   Date of Service: 5/22/2025 I Hospital Day: 0   Inpatient consult to Vascular Surgery  Consult performed by: ALEX Low  Consult ordered by: Ophelia Corbin PA-C        Physician Requesting Evaluation: Ajay Doe MD   Reason for Evaluation / Principal Problem: right AKA wound    Assessment & Plan  Complication of amputated stump (HCC)  Patient is a 64-year-old female, former smoker, with PMH HTN, HLD, type II DM, hyperparathyroidism, CVA, CKD 3, and PAD s/p bilateral AKA.  Patient presented to [facility] from [facility] due to right AKA stump wound. Vascular surgery is consulted for evaluation of right AKA stump wound.    Imaging:  Right femur xray 5/22/2025:  Status post above-knee amputation without radiographic evidence of osteomyelitis at this time.   CT pelvis 5/22/2025:  There is a thick-walled enhancing tract extending from the anterior medial aspect of the right lower extremity stump wound, into the distal right femoral stump. There is also a lucent tortuous channel extending retrograde within the right femur. This is consistent with interval worsening/progression of the chronic pyogenic osteomyelitis.    Plan:  -Bilateral AKA with nonhealing right AKA stump wound  -CT notes worsening/progression of chronic pyogenic osteomyelitis of right femur with probing wound  -Patient will likely require right AKA revision, timing TBD  -Recommend admission with consult to ID as patient will likely require prolonged antibiotic course.  -Daily wound care  -Continue ASA and atorvastatin  -Strict blood glucose control for optimal wound healing  -D/w Dr. Baldwin  I have discussed the above management plan in detail with the primary service.   Vascular Surgery service will follow.  Please contact the SecureChat role for the Vascular Surgery service with any  questions/concerns.    History of Present Illness   Dianna Varghese is a 64 y.o. female, former smoker, with PMH HTN, HLD, type II DM, hyperparathyroidism, CVA, CKD 3, and PAD s/p bilateral AKA.  Patient presented to [facility] from [facility] due to right AKA stump wound. Vascular surgery is consulted for evaluation of right AKA stump wound.    Patient is known to vascular surgery with a history of bilateral lower extremity revascularization attempts ultimately resulting in bilateral AKA in 2022. Patient's right AKA wound healing was complicated requiring right AKA revision and wound VAC placement in January 2023. Patient now presents with right AKA wound that has been ongoing for 1 month. Patient reports that she previously spontaneously developed 2 wounds to her right AKA, however one healed. She denies any trauma to her stump recently. She denies any fever or chills. Patient has not been utilizing prosthetics at her facility as she never got to finish physical therapy. She reports that approximately 1 month ago she received 1 week of Keflex due to AKA stump wound.    Review of Systems   Constitutional:  Negative for activity change, chills and fever.   Skin:  Positive for wound. Negative for color change and pallor.     Medical History Review: I have reviewed the patient's PMH, PSH, Social History, Family History, Meds, and Allergies     Objective :  Temp:  [98.5 °F (36.9 °C)] 98.5 °F (36.9 °C)  HR:  [64] 64  BP: (162)/(70) 162/70  Resp:  [16] 16  SpO2:  [92 %] 92 %  O2 Device: None (Room air)    I/O       None            Physical Exam  Vitals and nursing note reviewed.   Constitutional:       General: She is not in acute distress.     Appearance: She is well-developed.   HENT:      Head: Normocephalic and atraumatic.     Eyes:      Conjunctiva/sclera: Conjunctivae normal.       Cardiovascular:      Rate and Rhythm: Normal rate and regular rhythm.   Pulmonary:      Effort: Pulmonary effort is normal. No respiratory  distress.     Musculoskeletal:         General: Tenderness present. No swelling.      Cervical back: Normal range of motion and neck supple.      Right lower leg: No edema.      Left lower leg: No edema.      Amputation Right Lower Extremity: Right leg is amputated above knee.      Amputation Left Lower Extremity: Left leg is amputated above knee.     Skin:     General: Skin is warm and dry.      Capillary Refill: Capillary refill takes less than 2 seconds.      Findings: Wound present.     Neurological:      General: No focal deficit present.      Mental Status: She is alert and oriented to person, place, and time.     Psychiatric:         Mood and Affect: Mood normal.         Behavior: Behavior normal.        Right AKA wound 0.6cm x 0.6cm x 2.5cm      Lab Results: I have reviewed the following results:  Recent Labs     05/22/25  1236 05/22/25  1346   WBC 13.18*  --    HGB 11.4*  --    HCT 35.6  --      --    SODIUM 136  --    K 4.9  --      --    CO2 25  --    BUN 24  --    CREATININE 1.18  --    GLUC 230*  --    AST 30  --    ALT 36  --    ALB 3.5  --    TBILI 0.31  --    ALKPHOS 74  --    PTT  --  35*   INR  --  1.47*   LACTICACID  --  0.9       Imaging Results Review: I reviewed radiology reports from this admission including: CT pelvis and xray(s).

## 2025-05-22 NOTE — ASSESSMENT & PLAN NOTE
"Lab Results   Component Value Date    HGBA1C 9.1 (A) 03/19/2025       No results for input(s): \"POCGLU\" in the last 72 hours.    Blood Sugar Average: Last 72 hrs:    Home regimen is glargine 65 units at bedtime.  Semaglutide on hold. Decrease home glargine to 50U HS while inpatient and continue with sliding scale  "

## 2025-05-22 NOTE — ED PROVIDER NOTES
Time reflects when diagnosis was documented in both MDM as applicable and the Disposition within this note       Time User Action Codes Description Comment    5/22/2025  1:06 PM Ophelia Corbin [T87.89] Non-healing wound of amputation stump (HCC)     5/22/2025  3:13 PM Ophelia Corbin [M86.9] Osteomyelitis (HCC)           ED Disposition       ED Disposition   Admit    Condition   Stable    Date/Time   Thu May 22, 2025  3:13 PM    Comment   Case was discussed with kit and the patient's admission status was agreed to be Admission Status: inpatient status to the service of Dr. Doe   .               Assessment & Plan       Medical Decision Making  Will get xray and labs for concern of osteo/wound  Cultures.     Amount and/or Complexity of Data Reviewed  External Data Reviewed: labs, radiology and notes.  Labs: ordered. Decision-making details documented in ED Course.  Radiology: ordered and independent interpretation performed.  Discussion of management or test interpretation with external provider(s): [facility] - elke post NP    Risk  Prescription drug management.  Decision regarding hospitalization.  Emergency major surgery.  Risk Details: No abx - no sign sepsis - has osteo- vascular wants to hold iv abx - until ID sees pt and place on meds based on their recommendations.         ED Course as of 05/22/25 1814   Thu May 22, 2025   1318 WBC(!): 13.18   1318 GLUCOSE(!): 230   1329 Xray shows changes to bone compared to 2023  on the ct - will get repeat ct - discussed with dr bird and vascular - and discussed with CT techs  Will also get lactate and cultures   Discussed with Elke Post NP - vascular - agrees with ct and plan   1330 Creatinine: 1.18   1433 LACTIC ACID: 0.9   1514 Discussed results with vascular - to admit to [facility] and consult ID - no abx until ID consulted. - pt stable and no sepsis - Elke Post NP       Medications   insulin lispro (HumALOG/ADMELOG) 100  units/mL subcutaneous injection 1-6 Units (has no administration in time range)   iohexol (OMNIPAQUE) 350 MG/ML injection (MULTI-DOSE) 100 mL (100 mL Intravenous Given 5/22/25 1336)       ED Risk Strat Scores                    No data recorded        SBIRT 20yo+      Flowsheet Row Most Recent Value   Initial Alcohol Screen: US AUDIT-C     1. How often do you have a drink containing alcohol? 0 Filed at: 05/22/2025 1212   2. How many drinks containing alcohol do you have on a typical day you are drinking?  0 Filed at: 05/22/2025 1212   3b. FEMALE Any Age, or MALE 65+: How often do you have 4 or more drinks on one occassion? 0 Filed at: 05/22/2025 1212   Audit-C Score 0 Filed at: 05/22/2025 1212   DAILY: How many times in the past year have you...    Used an illegal drug or used a prescription medication for non-medical reasons? Never Filed at: 05/22/2025 1212                            History of Present Illness       Chief Complaint   Patient presents with    Wound Check     Pt arrives via EMS from [facility] for wound check. Pt has bilateral AKAs- pt reports the wounds started as blisters and have gotten worse. Pt on eliquis for previous stroke. Recently treated for infection with keflex.        Past Medical History[1]   Past Surgical History[2]   Family History[3]   Social History[4]   E-Cigarette/Vaping    E-Cigarette Use Never User       E-Cigarette/Vaping Substances    Nicotine No     THC No     CBD No     Flavoring No     Other No     Unknown No       I have reviewed and agree with the history as documented.     Emergency department with a wound to her right stump.  She is status post bilateral above-the-knee amputations chart reviewed patient was admitted from October 22- jan '23.  Was on keflex recently - completed course was healing and now re draining no fevers. Min pain/soreness.          Review of Systems   Respiratory: Negative.     Cardiovascular: Negative.    Gastrointestinal: Negative.    Skin:   Positive for wound.   All other systems reviewed and are negative.          Objective       ED Triage Vitals [05/22/25 1210]   Temperature Pulse Blood Pressure Respirations SpO2 Patient Position - Orthostatic VS   98.5 °F (36.9 °C) 64 162/70 16 92 % Sitting      Temp Source Heart Rate Source BP Location FiO2 (%) Pain Score    Oral Monitor Right arm -- No Pain      Vitals      Date and Time Temp Pulse SpO2 Resp BP Pain Score FACES Pain Rating User   05/22/25 1700 -- -- 96 % -- -- -- -- CH   05/22/25 1651 -- -- -- -- -- No Pain -- CH   05/22/25 1623 97.8 °F (36.6 °C) 63 94 % 18 165/85 -- -- DII   05/22/25 1217 -- -- -- -- -- No Pain -- LB   05/22/25 1210 98.5 °F (36.9 °C) 64 92 % 16 162/70 No Pain -- LB            Physical Exam  Vitals and nursing note reviewed.   Constitutional:       Appearance: She is well-developed. She is obese.   HENT:      Head: Normocephalic and atraumatic.      Right Ear: Tympanic membrane and external ear normal.      Left Ear: Tympanic membrane and external ear normal.     Eyes:      Conjunctiva/sclera: Conjunctivae normal.       Cardiovascular:      Rate and Rhythm: Normal rate and regular rhythm.      Heart sounds: Normal heart sounds.   Pulmonary:      Effort: Pulmonary effort is normal.      Breath sounds: Normal breath sounds.   Abdominal:      General: Bowel sounds are normal.      Palpations: Abdomen is soft.     Musculoskeletal:         General: Normal range of motion.      Cervical back: Neck supple.      Comments: Above the knee amputation bilaterally    Lymphadenopathy:      Cervical: No cervical adenopathy.     Skin:     General: Skin is warm.      Comments: Above the knee amputation - bilaterally - left appears well healed, there are 2 small wounds - 1 is oozing - draining - culture obtained, no cellulitic changes, min tenderness to area.      Neurological:      Mental Status: She is alert.     Psychiatric:         Behavior: Behavior normal.               Results Reviewed        Procedure Component Value Units Date/Time    APTT [[id number]]  (Abnormal) Collected: 05/22/25 1346    Lab Status: Final result Specimen: Blood from Arm, Right Updated: 05/22/25 1420     PTT 35 seconds     Protime-INR [[id number]]  (Abnormal) Collected: 05/22/25 1346    Lab Status: Final result Specimen: Blood from Arm, Right Updated: 05/22/25 1420     Protime 17.9 seconds      INR 1.47    Narrative:      INR Therapeutic Range    Indication                                             INR Range      Atrial Fibrillation                                               2.0-3.0  Hypercoagulable State                                    2.0.2.3  Left Ventricular Asist Device                            2.0-3.0  Mechanical Heart Valve                                  -    Aortic(with afib, MI, embolism, HF, LA enlargement,    and/or coagulopathy)                                     2.0-3.0 (2.5-3.5)     Mitral                                                             2.5-3.5  Prosthetic/Bioprosthetic Heart Valve               2.0-3.0  Venous thromboembolism (VTE: VT, PE        2.0-3.0    Lactic acid, plasma (w/reflex if result > 2.0) [[id number]]  (Normal) Collected: 05/22/25 1346    Lab Status: Final result Specimen: Blood from Arm, Right Updated: 05/22/25 1415     LACTIC ACID 0.9 mmol/L     Narrative:      Result may be elevated if tourniquet was used during collection.    Blood culture [[id number]] Collected: 05/22/25 1236    Lab Status: In process Specimen: Blood from Arm, Left Updated: 05/22/25 1359    Blood culture #1 [[id number]] Collected: 05/22/25 1346    Lab Status: In process Specimen: Blood from Arm, Right Updated: 05/22/25 1351    Comprehensive metabolic panel [[id number]]  (Abnormal) Collected: 05/22/25 1236    Lab Status: Final result Specimen: Blood from Arm, Left Updated: 05/22/25 1300     Sodium 136 mmol/L      Potassium 4.9 mmol/L      Chloride 103 mmol/L      CO2 25 mmol/L      ANION GAP 8 mmol/L      BUN 24  mg/dL      Creatinine 1.18 mg/dL      Glucose 230 mg/dL      Calcium 9.0 mg/dL      AST 30 U/L      ALT 36 U/L      Alkaline Phosphatase 74 U/L      Total Protein 7.2 g/dL      Albumin 3.5 g/dL      Total Bilirubin 0.31 mg/dL      eGFR 49 ml/min/1.73sq m     Narrative:      National Kid[employer] Disease Foundation guidelines for Chronic Kidney Disease (CKD):     Stage 1 with normal or high GFR (GFR > 90 mL/min/1.73 square meters)    Stage 2 Mild CKD (GFR = 60-89 mL/min/1.73 square meters)    Stage 3A Moderate CKD (GFR = 45-59 mL/min/1.73 square meters)    Stage 3B Moderate CKD (GFR = 30-44 mL/min/1.73 square meters)    Stage 4 Severe CKD (GFR = 15-29 mL/min/1.73 square meters)    Stage 5 End Stage CKD (GFR <15 mL/min/1.73 square meters)  Note: GFR calculation is accurate only with a steady state creatinine    CBC and differential [[id number]]  (Abnormal) Collected: 05/22/25 1236    Lab Status: Final result Specimen: Blood from Arm, Left Updated: 05/22/25 1243     WBC 13.18 Thousand/uL      RBC 4.23 Million/uL      Hemoglobin 11.4 g/dL      Hematocrit 35.6 %      MCV 84 fL      MCH 27.0 pg      MCHC 32.0 g/dL      RDW 13.9 %      MPV 9.2 fL      Platelets 296 Thousands/uL      nRBC 0 /100 WBCs      Segmented % 78 %      Immature Grans % 1 %      Lymphocytes % 12 %      Monocytes % 5 %      Eosinophils Relative 3 %      Basophils Relative 1 %      Absolute Neutrophils 10.31 Thousands/µL      Absolute Immature Grans 0.09 Thousand/uL      Absolute Lymphocytes 1.64 Thousands/µL      Absolute Monocytes 0.68 Thousand/µL      Eosinophils Absolute 0.39 Thousand/µL      Basophils Absolute 0.07 Thousands/µL     Wound culture and Gram stain [[id number]] Collected: 05/22/25 1236    Lab Status: In process Specimen: Wound from Leg, Right Updated: 05/22/25 1240            CT pelvis w contrast   Final Interpretation by Don Craig MD (05/22 1437)      1.  There is a thick-walled enhancing tract extending from the anterior medial  aspect of the right lower extremity stump wound, into the distal right femoral stump. There is also a lucent tortuous channel extending retrograde within the right femur. This    is consistent with interval worsening/progression of the chronic pyogenic osteomyelitis.      Workstation performed: [id number]         XR femur 2 views RIGHT   Final Interpretation by Mario Lofton MD (05/22 1323)   Status post above-knee amputation without radiographic evidence of osteomyelitis at this time.         Computerized Assisted Algorithm (CAA) may have been used to analyze all applicable images.         Workstation performed: [id number]             Procedures    ED Medication and Procedure Management   Prior to Admission Medications   Prescriptions Last Dose Informant Patient Reported? Taking?   ALPRAZolam (XANAX) 0.25 mg tablet 5/22/2025 Morning  Yes Yes   Sig: Take 0.25 mg by mouth daily as needed for anxiety   Continuous Blood Gluc  (FreeStyle Noé 14 Day [place]) LITO  Outside Facility (Specify) No No   Sig: Use 1 Units continuous   Continuous Blood Gluc Sensor (FreeStyle Noé 14 Day Sensor) MISC  Outside Facility (Specify) No No   Sig: Use daily as directed for CGM - Change every 14 days   Continuous Glucose  (FreeStyle Noé 3 [place]) LITO   No No   Sig: Use 1 Device in the morning   Continuous Glucose Sensor (FreeStyle Noé 3 Plus Sensor) MISC   No No   Sig: Use 1 each every 15 (fifteen) days   Insulin Pen Needle (B-D UF III MINI PEN NEEDLES) 31G X 5 MM MISC  Outside Facility (Specify) No No   Sig: USE WITH INSULIN FOUR TIMES DAILY   Lancets 28G [facility]  Outside Facility (Specify) No No   Sig: Use 1 each 4 (four) times a day   Levothyroxine Sodium 62.5 MCG CAPS 5/22/2025 Morning Outside Facility (Specify) Yes Yes   Sig: Take by mouth   Multiple Vitamin (multivitamin) tablet 5/22/2025 Morning Outside Facility (Specify) Yes Yes   Sig: Take 1 tablet by mouth in the morning.   Psyllium 25 % PACK Unknown Outside  Facility (Specify) Yes No   Sig: Take 1 packet by mouth in the morning.   Tirzepatide 2.5 MG/0.5ML SOAJ   No No   Sig: Inject 2.5 mg under the skin once a week   Patient not taking: Reported on 5/15/2025   Tirzepatide 5 MG/0.5ML SOAJ   No No   Sig: Inject 5 mg under the skin once a week Do not start before April 16, 2025.   Patient not taking: Reported on 5/15/2025   acetaminophen (TYLENOL) 325 mg tablet 5/22/2025 Morning Outside Facility (Specify) Yes Yes   Sig: Take 650 mg by mouth every 4 (four) hours as needed for mild pain   acetaminophen (TYLENOL) 650 mg suppository Not Taking Outside Facility (Specify) Yes No   Sig: Insert 650 mg into the rectum every 4 (four) hours as needed for mild pain   Patient not taking: Reported on 5/22/2025   amLODIPine (NORVASC) 10 mg tablet Unknown Outside Facility (Specify) No No   Sig: TAKE 1 TABLET BY MOUTH EVERY DAY   amiodarone 100 mg tablet Unknown Outside Facility (Specify) Yes No   Sig: Take 100 mg by mouth in the morning.   apixaban (ELIQUIS) 5 mg 5/22/2025 Morning Outside Facility (Specify) Yes Yes   Sig: Take 5 mg by mouth in the morning and 5 mg in the evening.   aspirin 81 mg chewable tablet 5/22/2025 Outside Facility (Specify) Yes Yes   Sig: Chew 81 mg in the morning.   atorvastatin (LIPITOR) 40 mg tablet Unknown Outside Facility (Specify) No No   Sig: TAKE 1 TABLET BY MOUTH EVERY DAY   calcium carbonate (TUMS) 500 mg chewable tablet Past Month Outside Facility (Specify) Yes Yes   Sig: Chew 2 tablets every 6 (six) hours as needed for indigestion or heartburn   ferrous sulfate 325 (65 Fe) mg tablet Unknown Outside Facility (Specify) Yes No   Sig: Take 325 mg by mouth daily with breakfast   gabapentin (NEURONTIN) 100 mg capsule  Outside Facility (Specify) Yes No   Sig: Take 100 mg by mouth daily at bedtime   hydrALAZINE (APRESOLINE) 100 MG tablet 5/22/2025 Outside Facility (Specify) No Yes   Sig: Take 1 tablet (100 mg total) by mouth every 8 (eight) hours    hydrochlorothiazide (HYDRODIURIL) 12.5 mg tablet Unknown Outside Facility (Specify) No No   Sig: Take 1 tablet (12.5 mg total) by mouth daily Do not start before February 4, 2023.   insulin glargine (LANTUS) 100 units/mL subcutaneous injection 5/22/2025 Morning Outside Facility (Specify) No Yes   Sig: Inject 46 Units under the skin every morning. Dose change   Patient taking differently: Inject 50 Units under the skin every morning. Dose change   latanoprost (XALATAN) 0.005 % ophthalmic solution  Outside Facility (Specify) Yes No   levothyroxine 50 mcg tablet 5/22/2025 Morning Outside Facility (Specify) Yes Yes   Sig: Take 50 mcg by mouth in the morning.   lisinopril (ZESTRIL) 40 mg tablet  Outside Facility (Specify) No No   Sig: TAKE 1 TABLET BY MOUTH EVERY DAY   metoprolol tartrate (LOPRESSOR) 50 mg tablet  Outside Facility (Specify) No No   Sig: Take 1 tablet (50 mg total) by mouth every 12 (twelve) hours   pantoprazole (PROTONIX) 20 mg tablet 5/22/2025 Morning Outside Facility (Specify) Yes Yes   Sig: Take 20 mg by mouth in the morning.   polyethylene glycol (MIRALAX) 17 g packet Unknown Outside Facility (Specify) Yes No   Sig: Take 17 g by mouth daily as needed for constipation   saccharomyces boulardii (FLORASTOR) 250 mg capsule Unknown Outside Facility (Specify) Yes No   Sig: Take 250 mg by mouth in the morning and 250 mg in the evening.   semaglutide, 0.25 or 0.5 mg/dose, (Ozempic, 0.25 or 0.5 MG/DOSE,) 2 mg/3 mL injection pen 5/22/2025 Morning  No Yes   Sig: Inject 0.375 mL (0.25 mg total) under the skin every 7 days   sertraline (ZOLOFT) 100 mg tablet 5/22/2025 Morning Outside Facility (Specify) No Yes   Sig: TAKE 1 TABLET BY MOUTH EVERY DAY   thiamine (VITAMIN B1) 100 mg tablet Unknown Outside Facility (Specify) No No   Sig: Take 1 tablet (100 mg total) by mouth daily Do not start before February 4, 2023.   travoprost (TRAVATAN-Z) 0.004 % ophthalmic solution  Outside Facility (Specify) Yes No   Sig:  Administer 1 drop into the left eye daily at bedtime   Patient not taking: Reported on 3/19/2025      Facility-Administered Medications: None     Current Discharge Medication List        CONTINUE these medications which have NOT CHANGED    Details   acetaminophen (TYLENOL) 325 mg tablet Take 650 mg by mouth every 4 (four) hours as needed for mild pain      ALPRAZolam (XANAX) 0.25 mg tablet Take 0.25 mg by mouth daily as needed for anxiety      apixaban (ELIQUIS) 5 mg Take 5 mg by mouth in the morning and 5 mg in the evening.      aspirin 81 mg chewable tablet Chew 81 mg in the morning.      calcium carbonate (TUMS) 500 mg chewable tablet Chew 2 tablets every 6 (six) hours as needed for indigestion or heartburn      hydrALAZINE (APRESOLINE) 100 MG tablet Take 1 tablet (100 mg total) by mouth every 8 (eight) hours  Refills: 0    Associated Diagnoses: Altered mental status; Sepsis ([facility]); Primary hypertension      insulin glargine (LANTUS) 100 units/mL subcutaneous injection Inject 46 Units under the skin every morning. Dose change  Qty: 10 mL, Refills: 4    Associated Diagnoses: Type 2 diabetes mellitus with hyperglycemia, with long-term current use of insulin ([facility])      levothyroxine 50 mcg tablet Take 50 mcg by mouth in the morning.      Levothyroxine Sodium 62.5 MCG CAPS Take by mouth      Multiple Vitamin (multivitamin) tablet Take 1 tablet by mouth in the morning.      pantoprazole (PROTONIX) 20 mg tablet Take 20 mg by mouth in the morning.      semaglutide, 0.25 or 0.5 mg/dose, (Ozempic, 0.25 or 0.5 MG/DOSE,) 2 mg/3 mL injection pen Inject 0.375 mL (0.25 mg total) under the skin every 7 days  Qty: 3 mL, Refills: 3    Associated Diagnoses: Type 2 diabetes mellitus with hyperglycemia, with long-term current use of insulin ([facility])      sertraline (ZOLOFT) 100 mg tablet TAKE 1 TABLET BY MOUTH EVERY DAY  Qty: 90 tablet, Refills: 1    Associated Diagnoses: Anxiety      acetaminophen (TYLENOL) 650 mg suppository Insert  650 mg into the rectum every 4 (four) hours as needed for mild pain      amiodarone 100 mg tablet Take 100 mg by mouth in the morning.      amLODIPine (NORVASC) 10 mg tablet TAKE 1 TABLET BY MOUTH EVERY DAY  Qty: 90 tablet, Refills: 1    Associated Diagnoses: Benign essential hypertension      atorvastatin (LIPITOR) 40 mg tablet TAKE 1 TABLET BY MOUTH EVERY DAY  Qty: 90 tablet, Refills: 1    Associated Diagnoses: Mixed hyperlipidemia      !! Continuous Blood Gluc  (FreeStyle Noé 14 Day [place]) LITO Use 1 Units continuous  Qty: 1 each, Refills: 0    Associated Diagnoses: Uncontrolled type 2 diabetes with neuropathy      !! Continuous Blood Gluc Sensor (FreeStyle Noé 14 Day Sensor) MISC Use daily as directed for CGM - Change every 14 days  Qty: 2 each, Refills: 1    Associated Diagnoses: Uncontrolled type 2 diabetes with neuropathy      !! Continuous Glucose  (FreeStyle Noé 3 [place]) LITO Use 1 Device in the morning  Qty: 1 each, Refills: 0    Associated Diagnoses: DM (diabetes mellitus) type II, controlled, with peripheral vascular disorder (HCC)      !! Continuous Glucose Sensor (FreeStyle Noé 3 Plus Sensor) MISC Use 1 each every 15 (fifteen) days  Qty: 6 each, Refills: 2    Associated Diagnoses: DM (diabetes mellitus) type II, controlled, with peripheral vascular disorder (HCC)      ferrous sulfate 325 (65 Fe) mg tablet Take 325 mg by mouth daily with breakfast      gabapentin (NEURONTIN) 100 mg capsule Take 100 mg by mouth daily at bedtime      hydrochlorothiazide (HYDRODIURIL) 12.5 mg tablet Take 1 tablet (12.5 mg total) by mouth daily Do not start before February 4, 2023.  Refills: 0    Associated Diagnoses: Altered mental status; Sepsis (HCC); Primary hypertension      Insulin Pen Needle (B-D UF III MINI PEN NEEDLES) 31G X 5 MM MISC USE WITH INSULIN FOUR TIMES DAILY  Qty: 200 each, Refills: 3    Associated Diagnoses: Uncontrolled type 2 diabetes mellitus with hyperglycemia (HCC)       Lancets 28G MISC Use 1 each 4 (four) times a day  Qty: 400 each, Refills: 0    Associated Diagnoses: Uncontrolled type 2 diabetes with neuropathy      latanoprost (XALATAN) 0.005 % ophthalmic solution       lisinopril (ZESTRIL) 40 mg tablet TAKE 1 TABLET BY MOUTH EVERY DAY  Qty: 90 tablet, Refills: 1    Associated Diagnoses: Hypertension, unspecified type      metoprolol tartrate (LOPRESSOR) 50 mg tablet Take 1 tablet (50 mg total) by mouth every 12 (twelve) hours  Refills: 0    Associated Diagnoses: Altered mental status; Sepsis (HCC); Primary hypertension      polyethylene glycol (MIRALAX) 17 g packet Take 17 g by mouth daily as needed for constipation      Psyllium 25 % PACK Take 1 packet by mouth in the morning.      saccharomyces boulardii (FLORASTOR) 250 mg capsule Take 250 mg by mouth in the morning and 250 mg in the evening.      thiamine (VITAMIN B1) 100 mg tablet Take 1 tablet (100 mg total) by mouth daily Do not start before February 4, 2023.  Refills: 0    Associated Diagnoses: Altered mental status; Sepsis (HCC); Primary hypertension      Tirzepatide 2.5 MG/0.5ML SOAJ Inject 2.5 mg under the skin once a week  Qty: 2 mL, Refills: 0    Associated Diagnoses: DM (diabetes mellitus) type II, controlled, with peripheral vascular disorder (HCC)      Tirzepatide 5 MG/0.5ML SOAJ Inject 5 mg under the skin once a week Do not start before April 16, 2025.  Qty: 2 mL, Refills: 2    Associated Diagnoses: DM (diabetes mellitus) type II, controlled, with peripheral vascular disorder (HCC)      travoprost (TRAVATAN-Z) 0.004 % ophthalmic solution Administer 1 drop into the left eye daily at bedtime       !! - Potential duplicate medications found. Please discuss with provider.        No discharge procedures on file.  ED SEPSIS DOCUMENTATION   Time reflects when diagnosis was documented in both MDM as applicable and the Disposition within this note       Time User Action Codes Description Comment    5/22/2025  1:06 PM  Corbin, Ophelia Add [T87.89] Non-healing wound of amputation stump (HCC)     5/22/2025  3:13 PM Ophelia Corbin [M86.9] Osteomyelitis (HCC)            Initial Sepsis Screening       Row Name 05/22/25 1518                Is the patient's history suggestive of a new or worsening infection? Yes (Proceed)  -AH        Suspected source of infection soft tissue;infected joint  -AH        Indicate SIRS criteria --        Are two or more of the above signs & symptoms of infection both present and new to the patient? No  -AH                  User Key  (r) = Recorded By, (t) = Taken By, (c) = Cosigned By      Initials Name Provider Type     Ophelia Corbin, PARon[facility] Physician Assistant                           [1]   Past Medical History:  Diagnosis Date    Altered mental status 1/24/2023    Anxiety     Depression     Diabetes (HCC)     Diabetes mellitus (HCC)     Diarrhea 11/14/2022    Epigastric pain 2/20/2022    Esophageal reflux     28 APR 2015 RESOLVED    Fall 1/17/2023    Forehead laceration 1/23/2023    Hyperlipidemia     Hypertension     Low back pain     sciatica    Pneumonia 2019    Stroke (HCC) 12/2015    small vessel, L frontal corona radiata. Rx asa 81, Lipitor 40, risk modification    Vitamin D deficiency    [2]   Past Surgical History:  Procedure Laterality Date    AMPUTATION ABOVE KNEE (AKA) Right 12/1/2022    Procedure: (AKA), PSB  BKA;  Surgeon: Johnathan Britton DO;  Location: [facility];  Service: Vascular    ARTERIOGRAM Right 11/7/2022    Procedure: -Right lower extremity angiogram -Right CFA balloon angioplasty with [id number]  [employer]  -Right CFA DCB with 6x40 Bard Lutonix -Right CFA atherectomy with Jetstream and distal embolization protection with 7mm Spider FX -Right SFA/Pop angioplasty with 4x40 Chololate balloon -Right SFA/Pop DCB with 5x150 Bard Lutonix -Right SFA stent with 6x80 [employer] Rosalinda x2 -R    COLONOSCOPY      IR AORTAGRAM WITH RUN-OFF  10/31/2022    IR LOWER  EXTREMITY ANGIOGRAM  11/10/2022    IR LOWER EXTREMITY ANGIOGRAM  2022    OR AMP LEG THRU TIBIA&FIBULA SEC CLOSURE/SCAR REV Right 2023    Procedure: AMPUTATION REVISION STUMP;  Surgeon: Johnathan Britton DO;  Location: [facility];  Service: Vascular    OR AMPUTATION THIGH THROUGH FEMUR ANY LEVEL Left 2022    Procedure: (AKA);  Surgeon: Silvano Thompson DO;  Location: [facility];  Service: Vascular    OR NEGATIVE PRESSURE WOUND THERAPY DME <= 50 SQ CM Bilateral 2022    Procedure: Right above knee amputation washout; vac change; Left above knee amputation debridement; vac placement;  Surgeon: Johnathan Britton DO;  [facility];  Service: Vascular    WOUND DEBRIDEMENT Right 2022    Procedure: AKA STUMP WASHOUT, Left AKA Staple removal. application of wound vac to Right AKA;  Surgeon: Wan Silva MD;  [facility];  Service: Vascular   [3]   Family History  Problem Relation Name Age of Onset    Diabetes Mother      Lung cancer Mother      Cancer Father      Lung cancer Father      Hypertension Brother      Hypertension Family     [4]   Social History  Tobacco Use    Smoking status: Former     Current packs/day: 0.00     Types: Cigarettes     Quit date:      Years since quittin.4    Smokeless tobacco: Never   Vaping Use    Vaping status: Never Used   Substance Use Topics    Alcohol use: Never     Comment: OCCASIONAL ALCOHOL USE IN ALL SCRIPTS    Drug use: No        Ophelia Corbin PA-C  [phone]

## 2025-05-22 NOTE — ASSESSMENT & PLAN NOTE
Stable.  No indication for transfusion at this time    Results from last 7 days   Lab Units 05/22/25  1236   HEMOGLOBIN g/dL 11.4*   MCV fL 84   RDW % 13.9

## 2025-05-22 NOTE — H&P
"H&P - Hospitalist   Name: Dianna Varghese 64 y.o. female I MRN: [id number]  Unit/Bed#: ED-42 I Date of Admission: 5/22/2025   Date of Service: 5/22/2025 I Hospital Day: 0     Assessment & Plan  Complication of amputated stump (HCC)  64-year-old female with a history of hypertension, hyperlipidemia, type 2 diabetes mellitus, CKD 3, CVA, and PAD status post bilateral AKA who was sent in here from [facility] due to right AKA stump wound.  Per vascular surgery recommendations appreciated.  They are planning amputation next Wednesday.  Please hold Eliquis Sunday evening in anticipation for this.  Patient received Keflex yesterday, since she is not septic vascular surgery recommends that we monitor her off antibiotics for the time being and involve infectious disease in the management of her care.  Initiate broad-spectrum antibiotic therapy should she become septic  Await cultures  PAD (peripheral artery disease) (HCC)  Status post left AKA 11/2022  Status post right AKA stump revision 1/2023  Continue aspirin, statin, Eliquis (On Eliquis for PAD / stroke. Of note, patient had a prior history of HIT)  Esophageal reflux  Continue as needed calcium carbonate.  Pantoprazole currently not in her medication list it appears that this has been recently discontinued as per documentation.  DM (diabetes mellitus) type II, controlled, with peripheral vascular disorder (HCC)  Lab Results   Component Value Date    HGBA1C 9.1 (A) 03/19/2025       No results for input(s): \"POCGLU\" in the last 72 hours.    Blood Sugar Average: Last 72 hrs:    Home regimen is glargine 65 units at bedtime.  Semaglutide on hold. Decrease home glargine to 50U HS while inpatient and continue with sliding scale  Anxiety and depression  Continue sertraline and as needed Xanax  Hypertension  Above goal  Resume hydrochlorothiazide, amlodipine, hydralazine, metoprolol, lisinopril  Anemia  Stable.  No indication for transfusion at this time    Results from last 7 " days   Lab Units 05/22/25  1236   HEMOGLOBIN g/dL 11.4*   MCV fL 84   RDW % 13.9       S/P bilateral above knee amputation (HCC)  Bilateral AKA, continue supportive care  Acquired hypothyroidism  Continue levothyroxine      VTE Pharmacologic Prophylaxis:   Moderate Risk (Score 3-4) - Pharmacological DVT Prophylaxis Ordered: apixaban (Eliquis).  Code Status: Prior full  code    Anticipated Length of Stay: Patient will be admitted on an inpatient basis with an anticipated length of stay of greater than 2 midnights secondary to id evaluation, vascular intervention.    History of Present Illness   Chief Complaint: stump wound    Dianna Varghese is a 64 y.o. female with a PMH of hypertension, hyperlipidemia, type 2 diabetes mellitus, CKD, CVA, and PAD status post bilateral AKA who presents with stump wound.    Patient has a history of bilateral AKA and currently resides at [facility].  She receives wound care there.  Over the last few days, facility noted that her wounds was not getting any better.  She was started on Keflex yesterday, but eventually they decided to send her in our ED for further evaluation.  Imaging findings show evidence of a thick-walled enhancing tract extending from the anterior medial aspect of the right lower extremity stump wound into the distal right femoral stump.  It appears that the presentation is consistent with interval progression and worsening of the chronic pyogenic osteomyelitis.  Vascular surgery recommended that we hold off antibiotic therapy at this time and have infectious disease assist in her management.  Otherwise, she is tentatively plan for surgical intervention on Wednesday.    Review of Systems   Constitutional:  Negative for chills and fever.   HENT:  Negative for congestion.    Respiratory:  Negative for cough, shortness of breath and wheezing.    Cardiovascular:  Negative for chest pain and palpitations.   Gastrointestinal:  Negative for abdominal pain, nausea and  vomiting.   Skin:  Positive for wound. Negative for pallor.   Neurological:  Negative for weakness and headaches.       Historical Information   Past Medical History[1]  Past Surgical History[2]  Social History[3]  E-Cigarette/Vaping    E-Cigarette Use Never User      E-Cigarette/Vaping Substances    Nicotine No     THC No     CBD No     Flavoring No     Other No     Unknown No      Family History[4]  Social History:  Marital Status: Single     Meds/Allergies   I have reveiwed home medications using records provided by [facility].  Prior to Admission medications    Medication Sig Start Date End Date Taking? Authorizing Provider   ALPRAZolam (XANAX) 0.25 mg tablet Take 0.25 mg by mouth daily as needed for anxiety   Yes Historical Provider, MD   acetaminophen (TYLENOL) 325 mg tablet Take 650 mg by mouth every 4 (four) hours as needed for mild pain    Historical Provider, MD   acetaminophen (TYLENOL) 650 mg suppository Insert 650 mg into the rectum every 4 (four) hours as needed for mild pain    Historical Provider, MD   amiodarone 100 mg tablet Take 100 mg by mouth in the morning.    Historical Provider, MD   amLODIPine (NORVASC) 10 mg tablet TAKE 1 TABLET BY MOUTH EVERY DAY 9/28/22   ALEX Coleman   apixaban (ELIQUIS) 5 mg Take 5 mg by mouth in the morning and 5 mg in the evening.    Historical Provider, MD   aspirin 81 mg chewable tablet Chew 81 mg in the morning.    Historical Provider, MD   atorvastatin (LIPITOR) 40 mg tablet TAKE 1 TABLET BY MOUTH EVERY DAY 1/6/23   ALEX Coleman   calcium carbonate (TUMS) 500 mg chewable tablet Chew 2 tablets every 6 (six) hours as needed for indigestion or heartburn    Historical Provider, MD   Continuous Blood Gluc  (FreeStyle Noé 14 Day [place]) LITO Use 1 Units continuous 8/25/21   Víctor Herring MD   Continuous Blood Gluc Sensor (FreeStyle Noé 14 Day Sensor) MISC Use daily as directed for CGM - Change every 14 days 3/31/22   Mariana Koehler MD    Continuous Glucose  (FreeStyle Noé 3 [place]) LITO Use 1 Device in the morning 3/19/25   Day Ivey PA-C   Continuous Glucose Sensor (FreeStyle Noé 3 Plus Sensor) MISC Use 1 each every 15 (fifteen) days 3/19/25   Day Ivey PA-C   ferrous sulfate 325 (65 Fe) mg tablet Take 325 mg by mouth daily with breakfast    Historical Provider, MD   gabapentin (NEURONTIN) 100 mg capsule Take 100 mg by mouth daily at bedtime    Historical Provider, MD   hydrALAZINE (APRESOLINE) 100 MG tablet Take 1 tablet (100 mg total) by mouth every 8 (eight) hours 2/3/23   Jeremiah Ng MD   hydrochlorothiazide (HYDRODIURIL) 12.5 mg tablet Take 1 tablet (12.5 mg total) by mouth daily Do not start before February 4, 2023. 2/4/23   Jeremiah Ng MD   insulin glargine (LANTUS) 100 units/mL subcutaneous injection Inject 46 Units under the skin every morning. Dose change  Patient taking differently: Inject 50 Units under the skin every morning. Dose change 12/19/24   Hernandez Rojas DO   Insulin Pen Needle (B-D UF III MINI PEN NEEDLES) 31G X 5 MM MISC USE WITH INSULIN FOUR TIMES DAILY 4/15/22   ALEX Coleman   Lancets 28G MISC Use 1 each 4 (four) times a day 6/14/21   ALEX Coleman   latanoprost (XALATAN) 0.005 % ophthalmic solution  2/5/25   Historical Provider, MD   levothyroxine 50 mcg tablet Take 50 mcg by mouth in the morning.    Historical Provider, MD   Levothyroxine Sodium 62.5 MCG CAPS Take by mouth    Historical Provider, MD   lisinopril (ZESTRIL) 40 mg tablet TAKE 1 TABLET BY MOUTH EVERY DAY 9/7/22   ALEX Coleman   metoprolol tartrate (LOPRESSOR) 50 mg tablet Take 1 tablet (50 mg total) by mouth every 12 (twelve) hours 2/3/23   Jeremiah Ng MD   Multiple Vitamin (multivitamin) tablet Take 1 tablet by mouth in the morning.    Historical Provider, MD   pantoprazole (PROTONIX) 20 mg tablet Take 20 mg by mouth in the morning.    Historical Provider, MD   polyethylene glycol (MIRALAX) 17 g  packet Take 17 g by mouth daily as needed for constipation    Historical Provider, MD   Psyllium 25 % PACK Take 1 packet by mouth in the morning.    Historical Provider, MD   saccharomyces boulardii (FLORASTOR) 250 mg capsule Take 250 mg by mouth in the morning and 250 mg in the evening.    Historical Provider, MD   semaglutide, 0.25 or 0.5 mg/dose, (Ozempic, 0.25 or 0.5 MG/DOSE,) 2 mg/3 mL injection pen Inject 0.375 mL (0.25 mg total) under the skin every 7 days 3/21/25   Day Ivey PA-C   sertraline (ZOLOFT) 100 mg tablet TAKE 1 TABLET BY MOUTH EVERY DAY 8/1/22   ALEX Coleman   thiamine (VITAMIN B1) 100 mg tablet Take 1 tablet (100 mg total) by mouth daily Do not start before February 4, 2023. 2/4/23   Jeremiah Ng MD   Tirzepatide 2.5 MG/0.5ML SOAJ Inject 2.5 mg under the skin once a week  Patient not taking: Reported on 5/15/2025 3/19/25   Day Ivey PA-C   Tirzepatide 5 MG/0.5ML SOAJ Inject 5 mg under the skin once a week Do not start before April 16, 2025.  Patient not taking: Reported on 5/15/2025 4/16/25   Day Ivey PA-C   travoprost (TRAVATAN-Z) 0.004 % ophthalmic solution Administer 1 drop into the left eye daily at bedtime  Patient not taking: Reported on 3/19/2025    Historical Provider, MD     Allergies   Allergen Reactions    Heparin Other (See Comments)     History of HIT       Objective :  Temp:  [98.5 °F (36.9 °C)] 98.5 °F (36.9 °C)  HR:  [64] 64  BP: (162)/(70) 162/70  Resp:  [16] 16  SpO2:  [92 %] 92 %  O2 Device: None (Room air)    Physical Exam  Vitals reviewed.   Constitutional:       General: She is not in acute distress.  HENT:      Head: Normocephalic.      Nose: Nose normal.      Mouth/Throat:      Mouth: Mucous membranes are moist.     Eyes:      General: No scleral icterus.      Cardiovascular:      Rate and Rhythm: Normal rate and regular rhythm.   Pulmonary:      Effort: Pulmonary effort is normal. No respiratory distress.      Breath sounds: No wheezing.   Abdominal:       General: There is no distension.      Palpations: Abdomen is soft.      Tenderness: There is no abdominal tenderness.     Musculoskeletal:      Comments: Bilateral AKA     Skin:     General: Skin is warm.     Neurological:      Mental Status: She is alert.     Psychiatric:         Mood and Affect: Mood normal.         Behavior: Behavior normal.       Lines/Drains:            Lab Results: I have reviewed the following results:  Results from last 7 days   Lab Units 05/22/25  1236   WBC Thousand/uL 13.18*   HEMOGLOBIN g/dL 11.4*   HEMATOCRIT % 35.6   PLATELETS Thousands/uL 296   SEGS PCT % 78*   LYMPHO PCT % 12*   MONO PCT % 5   EOS PCT % 3     Results from last 7 days   Lab Units 05/22/25  1236   SODIUM mmol/L 136   POTASSIUM mmol/L 4.9   CHLORIDE mmol/L 103   CO2 mmol/L 25   BUN mg/dL 24   CREATININE mg/dL 1.18   ANION GAP mmol/L 8   CALCIUM mg/dL 9.0   ALBUMIN g/dL 3.5   TOTAL BILIRUBIN mg/dL 0.31   ALK PHOS U/L 74   ALT U/L 36   AST U/L 30   GLUCOSE RANDOM mg/dL 230*     Results from last 7 days   Lab Units 05/22/25  1346   INR  1.47*         Lab Results   Component Value Date    HGBA1C 9.1 (A) 03/19/2025    HGBA1C 9.4 10/16/2024    HGBA1C 9.8 (H) 10/25/2022     Results from last 7 days   Lab Units 05/22/25  1346   LACTIC ACID mmol/L 0.9     Ct pelvis, xr femur      ** Please Note: This note has been constructed using a voice recognition system. **         [1]   Past Medical History:  Diagnosis Date    Altered mental status 1/24/2023    Anxiety     Depression     Diabetes (HCC)     Diabetes mellitus (HCC)     Diarrhea 11/14/2022    Epigastric pain 2/20/2022    Esophageal reflux     28 APR 2015 RESOLVED    Fall 1/17/2023    Forehead laceration 1/23/2023    Hyperlipidemia     Hypertension     Low back pain     sciatica    Pneumonia 2019    Stroke (HCC) 12/2015    small vessel, L frontal corona radiata. Rx asa 81, Lipitor 40, risk modification    Vitamin D deficiency    [2]   Past Surgical History:  Procedure  Laterality Date    AMPUTATION ABOVE KNEE (AKA) Right 2022    Procedure: (AKA), PSB  BKA;  Surgeon: Johnathan Britton DO;  [facility];  Service: Vascular    ARTERIOGRAM Right 2022    Procedure: -Right lower extremity angiogram -Right CFA balloon angioplasty with [id number]  [employer]  -Right CFA DCB with 6x40 Bard Lutonix -Right CFA atherectomy with Jetstream and distal embolization protection with 7mm Spider FX -Right SFA/Pop angioplasty with 4x40 Chololate balloon -Right SFA/Pop DCB with 5x150 Bard Lutonix -Right SFA stent with 6x80 [employer] Rosalinda x2 -R    COLONOSCOPY      IR AORTAGRAM WITH RUN-OFF  10/31/2022    IR LOWER EXTREMITY ANGIOGRAM  11/10/2022    IR LOWER EXTREMITY ANGIOGRAM  2022    CO AMP LEG THRU TIBIA&FIBULA SEC CLOSURE/SCAR REV Right 2023    Procedure: AMPUTATION REVISION STUMP;  Surgeon: Johnathan Britton DO;  [facility];  Service: Vascular    CO AMPUTATION THIGH THROUGH FEMUR ANY LEVEL Left 2022    Procedure: (AKA);  Surgeon: Silvano Thompson DO;  [facility];  Service: Vascular    CO NEGATIVE PRESSURE WOUND THERAPY DME <= 50 SQ CM Bilateral 2022    Procedure: Right above knee amputation washout; vac change; Left above knee amputation debridement; vac placement;  Surgeon: Johnathan Britton DO;  [facility];  Service: Vascular    WOUND DEBRIDEMENT Right 2022    Procedure: AKA STUMP WASHOUT, Left AKA Staple removal. application of wound vac to Right AKA;  Surgeon: Wan Silva MD;  [facility];  Service: Vascular   [3]   Social History  Tobacco Use    Smoking status: Former     Current packs/day: 0.00     Types: Cigarettes     Quit date:      Years since quittin.4    Smokeless tobacco: Never   Vaping Use    Vaping status: Never Used   Substance and Sexual Activity    Alcohol use: Never     Comment: OCCASIONAL ALCOHOL USE IN ALL SCRIPTS    Drug use: No   [4]   Family  History  Problem Relation Name Age of Onset    Diabetes Mother      Lung cancer Mother      Cancer Father      Lung cancer Father      Hypertension Brother      Hypertension Family

## 2025-05-23 LAB
ABO GROUP BLD: NORMAL
ALBUMIN SERPL BCG-MCNC: 3.5 G/DL (ref 3.5–5)
ALP SERPL-CCNC: 69 U/L (ref 34–104)
ALT SERPL W P-5'-P-CCNC: 32 U/L (ref 7–52)
ANION GAP SERPL CALCULATED.3IONS-SCNC: 9 MMOL/L (ref 4–13)
AST SERPL W P-5'-P-CCNC: 23 U/L (ref 13–39)
BASOPHILS # BLD AUTO: 0.05 THOUSANDS/ÂΜL (ref 0–0.1)
BASOPHILS NFR BLD AUTO: 0 % (ref 0–1)
BILIRUB SERPL-MCNC: 0.21 MG/DL (ref 0.2–1)
BLD GP AB SCN SERPL QL: NEGATIVE
BUN SERPL-MCNC: 28 MG/DL (ref 5–25)
CALCIUM SERPL-MCNC: 8.9 MG/DL (ref 8.4–10.2)
CHLORIDE SERPL-SCNC: 106 MMOL/L (ref 96–108)
CO2 SERPL-SCNC: 24 MMOL/L (ref 21–32)
CREAT SERPL-MCNC: 1.54 MG/DL (ref 0.6–1.3)
EOSINOPHIL # BLD AUTO: 0.34 THOUSAND/ÂΜL (ref 0–0.61)
EOSINOPHIL NFR BLD AUTO: 3 % (ref 0–6)
ERYTHROCYTE [DISTWIDTH] IN BLOOD BY AUTOMATED COUNT: 14.3 % (ref 11.6–15.1)
GFR SERPL CREATININE-BSD FRML MDRD: 35 ML/MIN/1.73SQ M
GLUCOSE SERPL-MCNC: 122 MG/DL (ref 65–140)
GLUCOSE SERPL-MCNC: 123 MG/DL (ref 65–140)
GLUCOSE SERPL-MCNC: 133 MG/DL (ref 65–140)
GLUCOSE SERPL-MCNC: 162 MG/DL (ref 65–140)
GLUCOSE SERPL-MCNC: 89 MG/DL (ref 65–140)
HCT VFR BLD AUTO: 34 % (ref 34.8–46.1)
HGB BLD-MCNC: 10.9 G/DL (ref 11.5–15.4)
IMM GRANULOCYTES # BLD AUTO: 0.07 THOUSAND/UL (ref 0–0.2)
IMM GRANULOCYTES NFR BLD AUTO: 1 % (ref 0–2)
INR PPP: 1.4 (ref 0.85–1.19)
LYMPHOCYTES # BLD AUTO: 1.89 THOUSANDS/ÂΜL (ref 0.6–4.47)
LYMPHOCYTES NFR BLD AUTO: 15 % (ref 14–44)
MAGNESIUM SERPL-MCNC: 1.8 MG/DL (ref 1.9–2.7)
MCH RBC QN AUTO: 26.9 PG (ref 26.8–34.3)
MCHC RBC AUTO-ENTMCNC: 32.1 G/DL (ref 31.4–37.4)
MCV RBC AUTO: 84 FL (ref 82–98)
MONOCYTES # BLD AUTO: 0.79 THOUSAND/ÂΜL (ref 0.17–1.22)
MONOCYTES NFR BLD AUTO: 6 % (ref 4–12)
MRSA NOSE QL CULT: NORMAL
NEUTROPHILS # BLD AUTO: 9.41 THOUSANDS/ÂΜL (ref 1.85–7.62)
NEUTS SEG NFR BLD AUTO: 75 % (ref 43–75)
NRBC BLD AUTO-RTO: 0 /100 WBCS
PLATELET # BLD AUTO: 272 THOUSANDS/UL (ref 149–390)
PMV BLD AUTO: 9.2 FL (ref 8.9–12.7)
POTASSIUM SERPL-SCNC: 4 MMOL/L (ref 3.5–5.3)
PROT SERPL-MCNC: 6.7 G/DL (ref 6.4–8.4)
PROTHROMBIN TIME: 17.2 SECONDS (ref 12.3–15)
RBC # BLD AUTO: 4.05 MILLION/UL (ref 3.81–5.12)
RH BLD: NEGATIVE
SODIUM SERPL-SCNC: 139 MMOL/L (ref 135–147)
SPECIMEN EXPIRATION DATE: NORMAL
WBC # BLD AUTO: 12.55 THOUSAND/UL (ref 4.31–10.16)

## 2025-05-23 PROCEDURE — 83735 ASSAY OF MAGNESIUM: CPT | Performed by: STUDENT IN AN ORGANIZED HEALTH CARE EDUCATION/TRAINING PROGRAM

## 2025-05-23 PROCEDURE — 87186 SC STD MICRODIL/AGAR DIL: CPT | Performed by: SURGERY

## 2025-05-23 PROCEDURE — G0545 PR INHERENT VISIT TO INPT: HCPCS | Performed by: INTERNAL MEDICINE

## 2025-05-23 PROCEDURE — 99232 SBSQ HOSP IP/OBS MODERATE 35: CPT | Performed by: FAMILY MEDICINE

## 2025-05-23 PROCEDURE — 80053 COMPREHEN METABOLIC PANEL: CPT | Performed by: STUDENT IN AN ORGANIZED HEALTH CARE EDUCATION/TRAINING PROGRAM

## 2025-05-23 PROCEDURE — 85610 PROTHROMBIN TIME: CPT

## 2025-05-23 PROCEDURE — 87205 SMEAR GRAM STAIN: CPT | Performed by: SURGERY

## 2025-05-23 PROCEDURE — 11042 DBRDMT SUBQ TIS 1ST 20SQCM/<: CPT | Performed by: SURGERY

## 2025-05-23 PROCEDURE — 86901 BLOOD TYPING SEROLOGIC RH(D): CPT

## 2025-05-23 PROCEDURE — 0JBL0ZZ EXCISION OF RIGHT UPPER LEG SUBCUTANEOUS TISSUE AND FASCIA, OPEN APPROACH: ICD-10-PCS | Performed by: INTERNAL MEDICINE

## 2025-05-23 PROCEDURE — 87150 DNA/RNA AMPLIFIED PROBE: CPT | Performed by: SURGERY

## 2025-05-23 PROCEDURE — NC001 PR NO CHARGE: Performed by: SURGERY

## 2025-05-23 PROCEDURE — 86850 RBC ANTIBODY SCREEN: CPT

## 2025-05-23 PROCEDURE — 87075 CULTR BACTERIA EXCEPT BLOOD: CPT | Performed by: SURGERY

## 2025-05-23 PROCEDURE — 82948 REAGENT STRIP/BLOOD GLUCOSE: CPT

## 2025-05-23 PROCEDURE — 87070 CULTURE OTHR SPECIMN AEROBIC: CPT | Performed by: SURGERY

## 2025-05-23 PROCEDURE — 87077 CULTURE AEROBIC IDENTIFY: CPT | Performed by: SURGERY

## 2025-05-23 PROCEDURE — 85025 COMPLETE CBC W/AUTO DIFF WBC: CPT | Performed by: STUDENT IN AN ORGANIZED HEALTH CARE EDUCATION/TRAINING PROGRAM

## 2025-05-23 PROCEDURE — 86900 BLOOD TYPING SEROLOGIC ABO: CPT

## 2025-05-23 PROCEDURE — 99254 IP/OBS CNSLTJ NEW/EST MOD 60: CPT | Performed by: INTERNAL MEDICINE

## 2025-05-23 RX ORDER — ALPRAZOLAM 0.25 MG
0.25 TABLET ORAL ONCE
Status: COMPLETED | OUTPATIENT
Start: 2025-05-23 | End: 2025-05-23

## 2025-05-23 RX ORDER — BUPIVACAINE HYDROCHLORIDE AND EPINEPHRINE 2.5; 5 MG/ML; UG/ML
INJECTION, SOLUTION EPIDURAL; INFILTRATION; INTRACAUDAL; PERINEURAL AS NEEDED
Status: DISCONTINUED | OUTPATIENT
Start: 2025-05-23 | End: 2025-05-23 | Stop reason: HOSPADM

## 2025-05-23 RX ORDER — DEXTROSE MONOHYDRATE AND SODIUM CHLORIDE 5; .9 G/100ML; G/100ML
30 INJECTION, SOLUTION INTRAVENOUS CONTINUOUS
Status: DISCONTINUED | OUTPATIENT
Start: 2025-05-23 | End: 2025-05-23

## 2025-05-23 RX ORDER — MAGNESIUM HYDROXIDE 1200 MG/15ML
LIQUID ORAL AS NEEDED
Status: DISCONTINUED | OUTPATIENT
Start: 2025-05-23 | End: 2025-05-23 | Stop reason: HOSPADM

## 2025-05-23 RX ORDER — CEFAZOLIN SODIUM 2 G/50ML
2000 SOLUTION INTRAVENOUS ONCE
Status: CANCELLED | OUTPATIENT
Start: 2025-05-23 | End: 2025-05-23

## 2025-05-23 RX ORDER — CHLORHEXIDINE GLUCONATE ORAL RINSE 1.2 MG/ML
15 SOLUTION DENTAL ONCE
Status: CANCELLED | OUTPATIENT
Start: 2025-05-23 | End: 2025-05-23

## 2025-05-23 RX ORDER — VANCOMYCIN HYDROCHLORIDE 1 G/200ML
10 INJECTION, SOLUTION INTRAVENOUS DAILY PRN
Status: DISCONTINUED | OUTPATIENT
Start: 2025-05-24 | End: 2025-05-26

## 2025-05-23 RX ORDER — INSULIN LISPRO 100 [IU]/ML
1-6 INJECTION, SOLUTION INTRAVENOUS; SUBCUTANEOUS EVERY 6 HOURS
Status: DISCONTINUED | OUTPATIENT
Start: 2025-05-23 | End: 2025-05-23

## 2025-05-23 RX ORDER — INSULIN LISPRO 100 [IU]/ML
1-6 INJECTION, SOLUTION INTRAVENOUS; SUBCUTANEOUS
Status: DISCONTINUED | OUTPATIENT
Start: 2025-05-23 | End: 2025-05-28 | Stop reason: HOSPADM

## 2025-05-23 RX ADMIN — ATORVASTATIN CALCIUM 40 MG: 40 TABLET, FILM COATED ORAL at 10:11

## 2025-05-23 RX ADMIN — HYDRALAZINE HYDROCHLORIDE 100 MG: 50 TABLET ORAL at 21:12

## 2025-05-23 RX ADMIN — METOPROLOL TARTRATE 50 MG: 50 TABLET, FILM COATED ORAL at 21:12

## 2025-05-23 RX ADMIN — INSULIN GLARGINE 50 UNITS: 100 INJECTION, SOLUTION SUBCUTANEOUS at 10:11

## 2025-05-23 RX ADMIN — AMLODIPINE BESYLATE 10 MG: 10 TABLET ORAL at 10:11

## 2025-05-23 RX ADMIN — HYDRALAZINE HYDROCHLORIDE 100 MG: 50 TABLET ORAL at 05:18

## 2025-05-23 RX ADMIN — OXYMETAZOLINE HYDROCHLORIDE 2 SPRAY: 0.05 SPRAY NASAL at 07:29

## 2025-05-23 RX ADMIN — Medication 250 MG: at 10:11

## 2025-05-23 RX ADMIN — ASPIRIN 81 MG CHEWABLE TABLET 81 MG: 81 TABLET CHEWABLE at 10:11

## 2025-05-23 RX ADMIN — OXYMETAZOLINE HYDROCHLORIDE 2 SPRAY: 0.05 SPRAY NASAL at 19:12

## 2025-05-23 RX ADMIN — ALPRAZOLAM 0.25 MG: 0.25 TABLET ORAL at 10:14

## 2025-05-23 RX ADMIN — GABAPENTIN 200 MG: 100 CAPSULE ORAL at 10:11

## 2025-05-23 RX ADMIN — DEXTROSE AND SODIUM CHLORIDE 30 ML/HR: 5; .9 INJECTION, SOLUTION INTRAVENOUS at 10:15

## 2025-05-23 RX ADMIN — FERROUS SULFATE TAB 325 MG (65 MG ELEMENTAL FE) 325 MG: 325 (65 FE) TAB at 12:23

## 2025-05-23 RX ADMIN — SERTRALINE HYDROCHLORIDE 100 MG: 100 TABLET ORAL at 10:11

## 2025-05-23 RX ADMIN — AMIODARONE HYDROCHLORIDE 100 MG: 100 TABLET ORAL at 10:11

## 2025-05-23 RX ADMIN — GABAPENTIN 200 MG: 100 CAPSULE ORAL at 21:09

## 2025-05-23 RX ADMIN — LATANOPROST 1 DROP: 50 SOLUTION OPHTHALMIC at 21:08

## 2025-05-23 RX ADMIN — LEVOTHYROXINE SODIUM 62.5 MCG: 0.12 TABLET ORAL at 05:18

## 2025-05-23 RX ADMIN — VANCOMYCIN HYDROCHLORIDE 1750 MG: 5 INJECTION, POWDER, LYOPHILIZED, FOR SOLUTION INTRAVENOUS at 19:09

## 2025-05-23 RX ADMIN — HYDROCHLOROTHIAZIDE 25 MG: 25 TABLET ORAL at 10:11

## 2025-05-23 RX ADMIN — ERTAPENEM SODIUM 1000 MG: 1 INJECTION INTRAMUSCULAR; INTRAVENOUS at 18:10

## 2025-05-23 RX ADMIN — METOPROLOL TARTRATE 50 MG: 50 TABLET, FILM COATED ORAL at 10:11

## 2025-05-23 RX ADMIN — Medication 250 MG: at 21:09

## 2025-05-23 RX ADMIN — INSULIN LISPRO 1 UNITS: 100 INJECTION, SOLUTION INTRAVENOUS; SUBCUTANEOUS at 21:16

## 2025-05-23 RX ADMIN — ALPRAZOLAM 0.25 MG: 0.25 TABLET ORAL at 21:42

## 2025-05-23 NOTE — PROGRESS NOTES
Dianna Varghese is a 64 y.o. female who is currently ordered Vancomycin IV with management by the Pharmacy Consult service.  Relevant clinical data and objective / subjective history reviewed.  Vancomycin Assessment:  Indication and Goal AUC/Trough: Bone/joint infection (goal -600, trough >10)  Clinical Status: stable  Micro:     Renal Function:  SCr: 1.54 mg/dL  Renal replacement: Not on dialysis  Days of Therapy: 1  Current Dose:  1,750 mg IV loading dose ~ 6 pm / when out of OR and after cultures are obtained    Vancomycin Plan:  New Dosing:  will check 5/24 creatinine, U/O and vanco level to determine tomorrow's dose due to increased creatinine this am  Next Level: 5/24/25 with AM labs  Renal Function Monitoring: Daily BMP and UOP  Pharmacy will continue to follow closely for s/sx of nephrotoxicity, infusion reactions and appropriateness of therapy.  BMP and CBC will be ordered per protocol. We will continue to follow the patient’s culture results and clinical progress daily.    Vera Mcdonald, Pharmacist

## 2025-05-23 NOTE — OP NOTE
OPERATIVE REPORT  PATIENT NAME: Dianna Varghese    :  1960  MRN: [id number]  Pt Location: [place] [address]    SURGERY DATE: 2025    Surgeons and Role:     * Sridevi Isaac MD - Primary    Preop Diagnosis:  Non-healing wound of amputation stump (HCC) T87.89    Post-Op Diagnosis Codes:     * Non-healing wound of amputation stump (HCC) [T87.89]    Procedure(s):  Right - WASHOUT AND DEBRIDEMENT OF RIGHT AKA     Specimen(s):  ID Type Source Tests Collected by Time Destination   A : RIGHT AKA WOUND CULTURE Wound Leg, Right ANAEROBIC CULTURE AND GRAM STAIN, WOUND CULTURE Sridevi Isaac MD 2025 1510        Estimated Blood Loss:   Minimal    Drains:  * No LDAs found *    Anesthesia Type:   Local    Operative Indications:  Non-healing wound of amputation stump (HCC) T87.89      Operative Findings:  Chronic thick walled sinus tract in right above knee amp stump  Infection was present at time of surgery as evidenced by abscess and purulence/pus.       Complications:   None    Procedure and Technique:  Patient was brought to the operating room and placed in a supine position.  Area of the right above-knee amputation stump was washed and prepped with Betadine.  The chronic draining sinus was identified.  Full timeout was carried out with all parties present.  Local anesthetic was infiltrated around the area of the wound.  We then used a curette to perform sharp debridement of the sinus tract.  No bone was encountered.  Hemostats and forceps were inserted in the wound cavity and necrotic subcutaneous fat was removed.  Deep cultures were taken.    Sharp excisional debridement was performed with curette involving the subcutaneous tissues.      The incision was extended by another 1 cm to allow for easier packing.  The wound was then thoroughly irrigated with Betadine mixed saline.  Final wound dimensions are 2 cm x 2 cm x 6 cm deep.  We then packed iodoform gauze into the wound cavity.  4 x 4, ABD pad and  Ace wrap dressings were applied.   I was present for the entire procedure., A qualified resident physician was not available., and A physician assistant was required during the procedure for retraction, tissue handling, dissection and suturing.    Patient Disposition:  PACU          SIGNATURE: Sridevi Isaac MD  DATE: May 23, 2025  TIME: 3:30 PM

## 2025-05-23 NOTE — ASSESSMENT & PLAN NOTE
Patient presenting with draining sinus tract from right AKA site.  CT showing a thick-walled enhancing tract extending from anterior medial aspect of right lower extremity stump wound into distal right femoral stump.  This is suggestive of worsening chronic osteomyelitis.  On exam there is a sinus tract with mild purulent drainage noted but no current radiographic evidence of abscess and patient has no cellulitis noted on exam.  She had a mild leukocytosis, though has down-trended and she is afebrile.  She is to go to the OR today for operative exploration of the sinus tract and cultures.  Per vascular surgery, if probing deep to bone then will consider revision with higher above-the-knee amputation.  She is at high risk for further nonhealing so palliative wound care options also being considered.  Per [facility] wound culture from 5/22 with 2+ GNR's.  On review of prior cultures patient has grown and MDR Enterobacter (1/17/23, 1/27/24), Enterococcus (1/2023), MSSA (1/24/23), and Candida albicans.   - As patient is not septic, afebrile and without evidence of acute cellulitis or abscess on CT recommend monitoring closely off antibiotic for now  -As we may not be able to obtain surgical cure of the osteomyelitis, patient may require long-term postop antibiotic and thus holding antibiotic will allow for accurate culture results to guide antibiotic choice  -After OR cultures obtained, can start empiric IV Ertapenem 1g daily and IV Vancomycin with pharmacy consult  -Follow up OR cultures to further adjust antibiotics  -Follow up operative findings

## 2025-05-23 NOTE — UTILIZATION REVIEW
Initial Clinical Review    Admission: Date/Time/Statement:   Admission Orders (From admission, onward)       Ordered        05/22/25 1514  INPATIENT ADMISSION  Once                          Orders Placed This Encounter   Procedures    INPATIENT ADMISSION     Standing Status:   Standing     Number of Occurrences:   1     Level of Care:   Med Surg [16]     Estimated length of stay:   More than 2 Midnights     Certification:   I certify that inpatient services are medically necessary for this patient for a duration of greater than two midnights. See H&P and MD Progress Notes for additional information about the patient's course of treatment.     ED Arrival Information       Expected   -    Arrival   5/22/2025 12:04    Acuity   Urgent              Means of arrival   Ambulance    Escorted by   [facility]    Service   Hospitalist    Admission type   Emergency              Arrival complaint   wound check             Chief Complaint   Patient presents with    Wound Check     Pt arrives via EMS from [facility] for wound check. Pt has bilateral AKAs- pt reports the wounds started as blisters and have gotten worse. Pt on eliquis for previous stroke. Recently treated for infection with keflex.        Initial Presentation: 64 y.o. female presents to ED via  EMS  from [facility]  with non healing wounds.  Started  po antibiotics the day prior to admission,  facility  decided  on ED  eval.   Imaging  appears to be progression and worsening  of chronic pyogenic osteo.   PMH  is  DM2, CKD, CVA, PAD, S/P  B/L  AKA.  Admit  Ip with Complication of  amputated stump  and plan is  monitor labs,  cultures, hold  further antibiotics for now  and surgical intervention within next week.    Vascular consult  Overall presentation concerning for osteo.  Needs proximalization   of  R  AKA, timing TBD.    Anticipated Length of Stay/Certification Statement: Patient will be admitted on an inpatient basis with an anticipated length of stay of  greater than 2 midnights secondary to id evaluation, vascular intervention.       Date:    5/23   Day 2:   Feels improved.  Still with  pain  right stump  on palpation.  R  AKA with  mild erythema.      OR planned this  pm.    ED Treatment-Medication Administration from 05/22/2025 1204 to 05/22/2025 1613         Date/Time Order Dose Route Action     05/22/2025 1336 iohexol (OMNIPAQUE) 350 MG/ML injection (MULTI-DOSE) 100 mL 100 mL Intravenous Given            Scheduled Medications:  amiodarone, 100 mg, Oral, Daily  amLODIPine, 10 mg, Oral, Daily  [Held by provider] apixaban, 5 mg, Oral, BID  aspirin, 81 mg, Oral, Daily  atorvastatin, 40 mg, Oral, Daily  ferrous sulfate, 325 mg, Oral, Daily With Lunch  gabapentin, 200 mg, Oral, BID  hydrALAZINE, 100 mg, Oral, Q8H MICHELLE  hydroCHLOROthiazide, 25 mg, Oral, Daily  insulin glargine, 50 Units, Subcutaneous, QAM  insulin lispro, 1-6 Units, Subcutaneous, Q6H  latanoprost, 1 drop, Left Eye, HS  levothyroxine, 62.5 mcg, Oral, Early Morning  lisinopril, 40 mg, Oral, Daily  metoprolol tartrate, 50 mg, Oral, Q12H MICEHLLE  saccharomyces boulardii, 250 mg, Oral, BID  sertraline, 100 mg, Oral, Daily      Continuous IV Infusions:  dextrose 5 % and sodium chloride 0.9 %, 30 mL/hr, Intravenous, Continuous      PRN Meds:  acetaminophen, 650 mg, Oral, Q4H PRN  ALPRAZolam, 0.25 mg, Oral, Daily PRN  calcium carbonate, 1,000 mg, Oral, Q6H PRN  oxymetazoline (AFRIN) 0.05 % 2 spray, patient supplied medication, 2 spray, Each Nare, Q12H PRN  polyethylene glycol, 17 g, Oral, Daily PRN      ED Triage Vitals [05/22/25 1210]   Temperature Pulse Respirations Blood Pressure SpO2 Pain Score   98.5 °F (36.9 °C) 64 16 162/70 92 % No Pain     Weight (last 2 days)       Date/Time Weight    05/22/25 1210 98.3 (216.71)            Vital Signs (last 3 days)       Date/Time Temp Pulse Resp BP MAP (mmHg) SpO2 O2 Device Patient Position - Orthostatic VS [place] Coma Scale Score Pain    05/23/25 1011 -- 67 -- 131/51  -- -- -- -- -- --    05/23/25 07:26:37 97.6 °F (36.4 °C) 66 18 132/53 79 94 % None (Room air) Lying -- --    05/22/25 22:58:28 97.9 °F (36.6 °C) 63 17 141/53 82 93 % None (Room air) Lying -- --    05/22/25 2106 -- 63 -- 136/61 86 91 % -- -- -- --    05/22/25 2017 -- -- -- -- -- -- -- -- -- No Pain    05/22/25 1700 -- -- -- -- -- 96 % None (Room air) -- -- --    05/22/25 1651 -- -- -- -- -- -- -- -- -- No Pain    05/22/25 16:23:47 97.8 °F (36.6 °C) 63 18 165/85 112 94 % -- -- -- --    05/22/25 1217 -- -- -- -- -- -- -- -- 15 No Pain    05/22/25 1210 98.5 °F (36.9 °C) 64 16 162/70 101 92 % None (Room air) Sitting -- No Pain              Pertinent Labs/Diagnostic Test Results:   Radiology:  CT pelvis w contrast   Final Interpretation by Don Craig MD (05/22 9777)      1.  There is a thick-walled enhancing tract extending from the anterior medial aspect of the right lower extremity stump wound, into the distal right femoral stump. There is also a lucent tortuous channel extending retrograde within the right femur. This    is consistent with interval worsening/progression of the chronic pyogenic osteomyelitis.      Workstation performed: [id number]         XR femur 2 views RIGHT   Final Interpretation by Mario Lofton MD (05/22 1323)   Status post above-knee amputation without radiographic evidence of osteomyelitis at this time.         Computerized Assisted Algorithm (CAA) may have been used to analyze all applicable images.         Workstation performed: [id number]           Cardiology:    GI:          Results from last 7 days   Lab Units 05/23/25  0525 05/22/25  1236   WBC Thousand/uL 12.55* 13.18*   HEMOGLOBIN g/dL 10.9* 11.4*   HEMATOCRIT % 34.0* 35.6   PLATELETS Thousands/uL 272 296   TOTAL NEUT ABS Thousands/µL 9.41* 10.31*         Results from last 7 days   Lab Units 05/23/25  0525 05/22/25  1236   SODIUM mmol/L 139 136   POTASSIUM mmol/L 4.0 4.9   CHLORIDE mmol/L 106 103   CO2 mmol/L 24 25   ANION GAP  mmol/L 9 8   BUN mg/dL 28* 24   CREATININE mg/dL 1.54* 1.18   EGFR ml/min/1.73sq m 35 49   CALCIUM mg/dL 8.9 9.0   MAGNESIUM mg/dL 1.8*  --      Results from last 7 days   Lab Units 05/23/25  0525 05/22/25  1236   AST U/L 23 30   ALT U/L 32 36   ALK PHOS U/L 69 74   TOTAL PROTEIN g/dL 6.7 7.2   ALBUMIN g/dL 3.5 3.5   TOTAL BILIRUBIN mg/dL 0.21 0.31     Results from last 7 days   Lab Units 05/23/25  0724 05/22/25  2103 05/22/25  1633   POC GLUCOSE mg/dl 133 196* 91     Results from last 7 days   Lab Units 05/23/25  0525 05/22/25  1236   GLUCOSE RANDOM mg/dL 122 230*               Results from last 7 days   Lab Units 05/22/25  1346   PROTIME seconds 17.9*   INR  1.47*   PTT seconds 35*             Results from last 7 days   Lab Units 05/22/25  1346   LACTIC ACID mmol/L 0.9           Results from last 7 days   Lab Units 05/22/25  1346 05/22/25  1236   BLOOD CULTURE  Received in Microbiology Lab. Culture in Progress. Received in Microbiology Lab. Culture in Progress.   GRAM STAIN RESULT   --  1+ Polys  No bacteria seen                   Past Medical History[1]  Present on Admission:   PAD (peripheral artery disease) (HCC)   Esophageal reflux   DM (diabetes mellitus) type II, controlled, with peripheral vascular disorder (HCC)   Anxiety and depression   Hypertension   Anemia   Acquired hypothyroidism      Admitting Diagnosis: Osteomyelitis (HCC) [M86.9]  Non-healing wound of amputation stump (HCC) [T87.89]  Age/Sex: 64 y.o. female    Network Utilization Review Department  ATTENTION: Please call with any questions or concerns to [phone] and carefully listen to the prompts so that you are directed to the right person. All voicemails are confidential.   For Discharge needs, contact Care Management DC Support Team at [phone] opt. 2  Send all requests for admission clinical reviews, approved or denied determinations and any other requests to dedicated fax number below belonging to the campus where the patient is  receiving treatment. List of dedicated fax numbers for the Facilities:  FACILITY NAME UR FAX NUMBER   ADMISSION DENIALS (Administrative/Medical Necessity) [phone]   DISCHARGE SUPPORT TEAM (NETWORK) [phone]   PARENT CHILD HEALTH (Maternity/NICU/Pediatrics) [phone]   [facility] [phone]   [facility] [phone]   [facility] [phone]   [facility] [phone]   Critical access hospital [phone]   [facility] [phone]   [facility] [phone]   [facility] [phone]   [facility] [phone]   [facility] [phone]   [facility] [phone]   [facility] [phone]               [1]   Past Medical History:  Diagnosis Date    Altered mental status 1/24/2023    Anxiety     Depression     Diabetes (HCC)     Diabetes mellitus (HCC)     Diarrhea 11/14/2022    Epigastric pain 2/20/2022    Esophageal reflux     28 APR 2015 RESOLVED    Fall 1/17/2023    Forehead laceration 1/23/2023    Hyperlipidemia     Hypertension     Low back pain     sciatica    Pneumonia 2019    Stroke (HCC) 12/2015    small vessel, L frontal corona radiata. Rx asa 81, Lipitor 40, risk modification    Vitamin D deficiency

## 2025-05-23 NOTE — PROGRESS NOTES
Progress Note - Vascular Surgery   Name: Dianna Varghese 64 y.o. female I MRN: [id number]  Unit/Bed#: E5 -01 I Date of Admission: 5/22/2025   Date of Service: 5/23/2025 I Hospital Day: 1    Assessment & Plan  Complication of amputated stump (HCC)  Patient is a 64-year-old female, former smoker, with PMH HTN, HLD, type II DM, hyperparathyroidism, CVA, CKD 3, and PAD s/p bilateral AKA.  Patient presented to [facility] from [facility] due to right AKA stump wound. Vascular surgery is consulted for evaluation of right AKA stump wound.    Imaging:  Right femur xray 5/22/2025:  Status post above-knee amputation without radiographic evidence of osteomyelitis at this time.   CT pelvis 5/22/2025:  There is a thick-walled enhancing tract extending from the anterior medial aspect of the right lower extremity stump wound, into the distal right femoral stump. There is also a lucent tortuous channel extending retrograde within the right femur. This is consistent with interval worsening/progression of the chronic pyogenic osteomyelitis.    Plan:  -Bilateral AKA with nonhealing right AKA stump wound  -CT notes worsening/progression of chronic pyogenic osteomyelitis of right femur with probing wound  -Planning for right AKA washout and revision this afternoon  -ID recommendations appreciated, will monitor off antibiotics until intra-op cultures are obtained.  -Daily packing changes per nursing  -Continue ASA and atorvastatin  -Strict blood glucose control for optimal wound healing  -Will d/w Dr. Isaac    24 Hour Events : no acute events overnight  Subjective :   Patient resting comfortably in bed, made NPO this morning for possible washout this afternoon. Patient denies any significant discomfort otherwise    Objective :  Temp:  [97.6 °F (36.4 °C)-98.5 °F (36.9 °C)] 97.6 °F (36.4 °C)  HR:  [63-67] 67  BP: (131-165)/(51-85) 131/51  Resp:  [16-18] 18  SpO2:  [91 %-96 %] 94 %  O2 Device: None (Room air)     I/O         05/21 0701 05/22  0700 05/22 0701  05/23 0700 05/23 0701  05/24 0700    Urine (mL/kg/hr)  631     Total Output  631     Net  -631                    Physical Exam  Vitals and nursing note reviewed.   Constitutional:       General: She is not in acute distress.     Appearance: She is well-developed.   HENT:      Head: Normocephalic and atraumatic.     Eyes:      Conjunctiva/sclera: Conjunctivae normal.       Cardiovascular:      Rate and Rhythm: Normal rate and regular rhythm.   Pulmonary:      Effort: Pulmonary effort is normal. No respiratory distress.     Musculoskeletal:         General: Tenderness present. No swelling.      Cervical back: Normal range of motion and neck supple.      Right lower leg: No edema.      Left lower leg: No edema.      Amputation Right Lower Extremity: Right leg is amputated above knee.      Amputation Left Lower Extremity: Left leg is amputated above knee.     Skin:     General: Skin is warm and dry.      Capillary Refill: Capillary refill takes less than 2 seconds.      Findings: Wound present.     Neurological:      General: No focal deficit present.      Mental Status: She is alert and oriented to person, place, and time.     Psychiatric:         Mood and Affect: Mood normal.         Behavior: Behavior normal.           Lab Results: I have reviewed the following results:  CBC with diff:   Lab Results   Component Value Date    WBC 12.55 (H) 05/23/2025    HGB 10.9 (L) 05/23/2025    HCT 34.0 (L) 05/23/2025    MCV 84 05/23/2025     05/23/2025    RBC 4.05 05/23/2025    MCH 26.9 05/23/2025    MCHC 32.1 05/23/2025    RDW 14.3 05/23/2025    MPV 9.2 05/23/2025    NRBC 0 05/23/2025   ,   BMP/CMP:  Lab Results   Component Value Date    SODIUM 139 05/23/2025    SODIUM 142 03/06/2023    K 4.0 05/23/2025    K 4.4 03/06/2023     05/23/2025     03/06/2023    CO2 24 05/23/2025    CO2 23 03/06/2023    BUN 28 (H) 05/23/2025    BUN 41 (H) 03/06/2023    CREATININE 1.54 (H) 05/23/2025    CREATININE 1.02  "03/06/2023    CALCIUM 8.9 05/23/2025    CALCIUM 8.5 03/06/2023    AST 23 05/23/2025    AST 74 (H) 03/06/2023    ALT 32 05/23/2025     (H) 03/06/2023    ALKPHOS 69 05/23/2025    ALKPHOS 254 (H) 03/06/2023    EGFR 35 05/23/2025    EGFR 63 03/06/2023   ,   Lipid Panel: No results found for: \"CHOL\",   Coags:   Lab Results   Component Value Date    PT 24.1 (H) 03/06/2023    PTT 35 (H) 05/22/2025    INR 1.47 (H) 05/22/2025    INR 2.2 03/06/2023   ,   Blood Culture:   Lab Results   Component Value Date    BLOODCX Received in Microbiology Lab. Culture in Progress. 05/22/2025   ,   Urinalysis:   Lab Results   Component Value Date    COLORU Yellow 01/17/2023    CLARITYU Cloudy 01/17/2023    SPECGRAV 1.025 01/17/2023    PHUR 6.0 01/17/2023    PHUR 6.5 02/23/2018    LEUKOCYTESUR Small (A) 01/17/2023    NITRITE Negative 01/17/2023    GLUCOSEU Negative 01/17/2023    KETONESU Negative 01/17/2023    BILIRUBINUR Negative 01/17/2023    BLOODU Large (A) 01/17/2023   ,   Urine Culture:   Lab Results   Component Value Date    URINECX >100,000 cfu/ml Enterococcus faecalis (A) 01/17/2023    URINECX <10,000 cfu/ml Citrobacter freundii (A) 01/17/2023    URINECX <10,000 cfu/ml Klebsiella variicola (A) 01/17/2023   ,   Wound Culure:   Lab Results   Component Value Date    WOUNDCULT Growth in Broth culture only Candida albicans (A) 01/24/2023    WOUNDCULT Few Colonies of 01/24/2023       "

## 2025-05-23 NOTE — PLAN OF CARE
Problem: Potential for Falls  Goal: Patient will remain free of falls  Description: INTERVENTIONS:  - Educate patient/family on patient safety including physical limitations  - Instruct patient to call for assistance with activity   - Consider consulting OT/PT to assist with strengthening/mobility based on AM PAC & JH-HLM score  - Consult OT/PT to assist with strengthening/mobility   - Keep Call bell within reach  - Keep bed low and locked with side rails adjusted as appropriate  - Keep care items and personal belongings within reach  - Initiate and maintain comfort rounds  - Make Fall Risk Sign visible to staff  - Offer Toileting every 2 Hours, in advance of need  - Initiate/Maintain BED alarm  - Apply yellow socks and bracelet for high fall risk patients  - Consider moving patient to room near nurses station  Outcome: Progressing     Problem: Prexisting or High Potential for Compromised Skin Integrity  Goal: Skin integrity is maintained or improved  Description: INTERVENTIONS:  - Identify patients at risk for skin breakdown  - Assess and monitor skin integrity including under and around medical devices   - Assess and monitor nutrition and hydration status  - Monitor labs  - Assess for incontinence   - Turn and reposition patient  - Assist with mobility/ambulation  - Relieve pressure over lesly prominences   - Avoid friction and shearing  - Provide appropriate hygiene as needed including keeping skin clean and dry  - Evaluate need for skin moisturizer/barrier cream  - Collaborate with interdisciplinary team  - Patient/family teaching  - Consider wound care consult    Assess:  - Review Nicanor scale daily  - Inspect skin when repositioning, toileting, and assisting with ADLS  Outcome: Progressing     Problem: PAIN - ADULT  Goal: Verbalizes/displays adequate comfort level or baseline comfort level  Description: Interventions:  - Encourage patient to monitor pain and request assistance  - Assess pain using  appropriate pain scale  - Administer analgesics as ordered based on type and severity of pain and evaluate response  - Implement non-pharmacological measures as appropriate and evaluate response  - Consider cultural and social influences on pain and pain management  - Notify physician/advanced practitioner if interventions unsuccessful or patient reports new pain  - Educate patient/family on pain management process including their role and importance of  reporting pain   - Provide non-pharmacologic/complimentary pain relief interventions  Outcome: Progressing     Problem: INFECTION - ADULT  Goal: Absence or prevention of progression during hospitalization  Description: INTERVENTIONS:  - Assess and monitor for signs and symptoms of infection  - Monitor lab/diagnostic results  - Monitor all insertion sites, i.e. indwelling lines, tubes, and drains  - Monitor endotracheal if appropriate and nasal secretions for changes in amount and color  - [place] appropriate cooling/warming therapies per order  - Administer medications as ordered  - Instruct and encourage patient and family to use good hand hygiene technique  - Identify and instruct in appropriate isolation precautions for identified infection/condition  Outcome: Progressing  Goal: Absence of fever/infection during neutropenic period  Description: INTERVENTIONS:  - Monitor WBC  - Perform strict hand hygiene  - Limit to healthy visitors only  - No plants, dried, fresh or silk flowers with hopkins in patient room  Outcome: Progressing     Problem: SAFETY ADULT  Goal: Maintain or return to baseline ADL function  Description: INTERVENTIONS:  -  Assess patient's ability to carry out ADLs; assess patient's baseline for ADL function and identify physical deficits which impact ability to perform ADLs (bathing, care of mouth/teeth, toileting, grooming, dressing, etc.)  - Assess/evaluate cause of self-care deficits   - Assess range of motion  - Assess patient's mobility;  develop plan if impaired  - Assess patient's need for assistive devices and provide as appropriate  - Encourage maximum independence but intervene and supervise when necessary  - Involve family in performance of ADLs  - Assess for home care needs following discharge   - Consider OT consult to assist with ADL evaluation and planning for discharge  - Provide patient education as appropriate  - Monitor functional capacity and physical performance, use of AM PAC & JH-HLM   - Monitor gait, balance and fatigue with ambulation    Outcome: Progressing     Problem: DISCHARGE PLANNING  Goal: Discharge to home or other facility with appropriate resources  Description: INTERVENTIONS:  - Identify barriers to discharge w/patient and caregiver  - Arrange for needed discharge resources and transportation as appropriate  - Identify discharge learning needs (meds, wound care, etc.)  - Arrange for interpretive services to assist at discharge as needed  - Refer to Case Management Department for coordinating discharge planning if the patient needs post-hospital services based on physician/advanced practitioner order or complex needs related to functional status, cognitive ability, or social support system  Outcome: Progressing     Problem: Knowledge Deficit  Goal: Patient/family/caregiver demonstrates understanding of disease process, treatment plan, medications, and discharge instructions  Description: Complete learning assessment and assess knowledge base.  Interventions:  - Provide teaching at level of understanding  - Provide teaching via preferred learning methods  Outcome: Progressing

## 2025-05-23 NOTE — ASSESSMENT & PLAN NOTE
Lab Results   Component Value Date    HGBA1C 9.1 (A) 03/19/2025       Recent Labs     05/22/25  1633 05/22/25  2103 05/23/25  0724   POCGLU 91 196* 133       Blood Sugar Average: Last 72 hrs:  (P) 140  Home regimen is glargine 65 units at bedtime.  Semaglutide on hold. Decrease home glargine to 50U HS while inpatient and continue with sliding scale every 6 hours while npo today   Watch for hypoglycemia  Placed on gentle hydration with dextrose and saline at 30 cc/h

## 2025-05-23 NOTE — CASE MANAGEMENT
Case Management Assessment & Discharge Planning Note    Patient name Dianna Varghese  Location OR POOL/OR POOL MRN [id number]  : 1960 Date 2025       Current Admission Date: 2025  Current Admission Diagnosis:Complication of amputated stump (HCC)   Patient Active Problem List    Diagnosis Date Noted    Complication of amputated stump ([facility]) 2025    Soft tissue infection 2025    Chronic renal insufficiency, stage 3 (moderate) ([facility]) 2025    Primary open angle glaucoma (POAG) of left eye, mild stage 2025    Polypharmacy 2025    Phantom pain after amputation of lower extremity ([facility]) 2024    Leukemoid reaction 2024    History of cardiac arrest 2024    Advance care planning 2024    Acquired hypothyroidism 2024    S/P bilateral above knee amputation ([facility]) 2023    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 ([facility]) 2023    Prolonged Q-T interval on ECG 2023    Hypomagnesemia 2023    Mild protein-calorie malnutrition ([facility]) 2022    HIT (heparin-induced thrombocytopenia) ([facility]) 2022    Anemia 10/30/2022    PAD (peripheral artery disease) ([facility]) 10/25/2022    Hyperparathyroidism ([facility]) 10/18/2021    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 ([facility]) 2021    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    Depression, recurrent ([facility]) 2018    Esophageal reflux 2018    DM (diabetes mellitus) type II, controlled, with peripheral vascular disorder ([facility]) 2018    Diabetic peripheral neuropathy ([facility]) 2017    History of CVA (cerebrovascular accident) 2015    Anxiety and depression 2015    Vitamin D deficiency 2015    Hypertension 2012    Familial combined hyperlipidemia 2012      LOS (days): 1  Geometric Mean LOS (GMLOS) (days): 3.3  Days to GMLOS:2.3     OBJECTIVE:    Risk of Unplanned Readmission Score:  15.39         Current admission status: Inpatient  Referral Reason: Other (Facility return)    Preferred Pharmacy:   [facility] [place] - [address]  [address] [address]  Phone: [phone] Fax: [phone]    Primary Care Provider: Seb Chun DO    Primary Insurance: [facility]  Secondary Insurance:     ASSESSMENT:  Active Health Care Proxies       Jailyn Varghese Health Care Agent - Child   Primary Phone: [phone] (Home)                 Readmission Root Cause  30 Day Readmission: No    Patient Information  Admitted from:: Facility  During Assessment patient was accompanied by: Not accompanied during assessment  Primary Caregiver: Other (Comment)  Caregiver's Name:: [facility] Staff  Caregiver's Relationship to Patient:: Facility Staff  Caregiver's Telephone Number:: Sandor  Support Systems: Self, Family members, Other (Comment) (Facility staff)  County of Residence: [place]  What city do you live in?: [place] [facility]  Home entry access options. Select all that apply.: No steps to enter home  Type of Current Residence: Facility  Upon entering residence, is there a bedroom on the main floor (no further steps)?: Yes  Upon entering residence, is there a bathroom on the main floor (no further steps)?: Yes  Living Arrangements: Other (Comment) (Lives in a Facility [facility])  Is patient a ?: No    Activities of Daily Living Prior to Admission  Functional Status: Total dependent  Completes ADLs independently?: No  Level of ADL dependence: Assistance  Ambulates independently?: No  Level of ambulatory dependence: Total Dependent  Does patient use assisted devices?: Yes  Assisted Devices (DME) used: Wheelchair, Alexandr lift, Hospital Bed, Shower Chair  Does patient currently own DME?: Yes  What DME does the patient currently own?: Shower Chair, Wheelchair, Hospital Bed, Alexandr lift  Does patient have a history of Outpatient Therapy  (PT/OT)?: No  Does the patient have a history of Short-Term Rehab?: Yes ([facility])  Does patient have a history of HHC?: No  Does patient currently have HHC?: No    Patient Information Continued  Income Source: SSI/SSD  Does patient have prescription coverage?: Yes  Can the patient afford their medications and any related supplies (such as glucometers or test strips)?: N/A  Does patient receive dialysis treatments?: No  Does patient have a history of substance abuse?: No  Does patient have a history of Mental Health Diagnosis?: Yes (Anxiety and depression)  Is patient receiving treatment for mental health?: Yes    Means of Transportation  Means of Transport to Appts:: Other (Comment) ([facility] vs [facility])    DISCHARGE DETAILS:    Contacts  Patient Contacts: Jailyn Gonzales (dtr)  Relationship to Patient:: Family  Contact Method: Phone  Phone Number: [phone]  Reason/Outcome: Emergency Contact, Discharge Planning, Referral    Other Referral/Resources/Interventions Provided:  Interventions: Facility Return        Additional Comments: Chart review and CM assessment completed.  Call to daughter Jailyn and angel CEBALLOS introducing CM and role.  Patient is a Long term resident at [facility].  Rferral made to [facility] via [facility].  Patient currently in OR unable to talk to patient.  CM department following thru discharge

## 2025-05-23 NOTE — ASSESSMENT & PLAN NOTE
Stable.  No indication for transfusion at this time    Results from last 7 days   Lab Units 05/23/25  0525 05/22/25  1236   HEMOGLOBIN g/dL 10.9* 11.4*   MCV fL 84 84   RDW % 14.3 13.9

## 2025-05-23 NOTE — ASSESSMENT & PLAN NOTE
Status post left AKA 11/2022  Status post right AKA stump revision 1/2023  Continue aspirin, statin, hold eliquis for surgery (On Eliquis for PAD / stroke. Of note, patient had a prior history of HIT)  Resume eliquis tomorrow if ok with surgery

## 2025-05-23 NOTE — ASSESSMENT & PLAN NOTE
Patient is a 64-year-old female, former smoker, with PMH HTN, HLD, type II DM, hyperparathyroidism, CVA, CKD 3, and PAD s/p bilateral AKA.  Patient presented to [facility] from [facility] due to right AKA stump wound. Vascular surgery is consulted for evaluation of right AKA stump wound.    Imaging:  Right femur xray 5/22/2025:  Status post above-knee amputation without radiographic evidence of osteomyelitis at this time.   CT pelvis 5/22/2025:  There is a thick-walled enhancing tract extending from the anterior medial aspect of the right lower extremity stump wound, into the distal right femoral stump. There is also a lucent tortuous channel extending retrograde within the right femur. This is consistent with interval worsening/progression of the chronic pyogenic osteomyelitis.    Plan:  -Bilateral AKA with nonhealing right AKA stump wound  -CT notes worsening/progression of chronic pyogenic osteomyelitis of right femur with probing wound  -Planning for right AKA washout and revision this afternoon  -ID recommendations appreciated, will monitor off antibiotics until intra-op cultures are obtained.  -Daily packing changes per nursing  -Continue ASA and atorvastatin  -Strict blood glucose control for optimal wound healing  -Will d/w Dr. Isaac

## 2025-05-23 NOTE — ASSESSMENT & PLAN NOTE
64-year-old female with a history of hypertension, hyperlipidemia, type 2 diabetes mellitus, CKD 3, CVA, and PAD status post bilateral AKA who was sent in here from [facility] due to right AKA stump wound.  Per vascular surgery recommendations appreciated.  They are planning amputation today.  Please hold Eliquis  currently not on any antibiotics.  Will do IntraOp cultures and proceed further as indicated.  Blood and wound cultures also done on admission  infectious disease consult is pending at this time and antibiotic selection as per them for the chronic pyogenic osteomyelitis rt stump  Ct shows There is a thick-walled enhancing tract extending from the anterior medial aspect of the right lower extremity stump wound, into the distal right femoral stump. There is also a lucent tortuous channel extending retrograde within the right femur. This is consistent with interval worsening/progression of the chronic pyogenic osteomyelitis.      please note there are plans for her to return back to the OR on Wednesday for bone resection and revision

## 2025-05-23 NOTE — PROGRESS NOTES
Progress Note - Hospitalist   Name: Dianna Varghese 64 y.o. female I MRN: [id number]  Unit/Bed#: E5 -01 I Date of Admission: 5/22/2025   Date of Service: 5/23/2025 I Hospital Day: 1    Assessment & Plan  Complication of amputated stump (HCC)  64-year-old female with a history of hypertension, hyperlipidemia, type 2 diabetes mellitus, CKD 3, CVA, and PAD status post bilateral AKA who was sent in here from [facility] due to right AKA stump wound.  Per vascular surgery recommendations appreciated.  They are planning amputation today.  Please hold Eliquis  currently not on any antibiotics.  Will do IntraOp cultures and proceed further as indicated.  Blood and wound cultures also done on admission  infectious disease consult is pending at this time and antibiotic selection as per them for the chronic pyogenic osteomyelitis rt stump  Ct shows There is a thick-walled enhancing tract extending from the anterior medial aspect of the right lower extremity stump wound, into the distal right femoral stump. There is also a lucent tortuous channel extending retrograde within the right femur. This is consistent with interval worsening/progression of the chronic pyogenic osteomyelitis.      please note there are plans for her to return back to the OR on Wednesday for bone resection and revision  PAD (peripheral artery disease) (HCC)  Status post left AKA 11/2022  Status post right AKA stump revision 1/2023  Continue aspirin, statin, hold eliquis for surgery (On Eliquis for PAD / stroke. Of note, patient had a prior history of HIT)  Resume eliquis tomorrow if ok with surgery  Esophageal reflux  Continue as needed calcium carbonate.  Pantoprazole currently not in her medication list it appears that this has been recently discontinued as per documentation.  DM (diabetes mellitus) type II, controlled, with peripheral vascular disorder (HCC)  Lab Results   Component Value Date    HGBA1C 9.1 (A) 03/19/2025       Recent Labs      05/22/25  1633 05/22/25  2103 05/23/25  0724   POCGLU 91 196* 133       Blood Sugar Average: Last 72 hrs:  (P) 140  Home regimen is glargine 65 units at bedtime.  Semaglutide on hold. Decrease home glargine to 50U HS while inpatient and continue with sliding scale every 6 hours while npo today   Watch for hypoglycemia  Placed on gentle hydration with dextrose and saline at 30 cc/h  Anxiety and depression  Continue sertraline and as needed Xanax  Hypertension  Bp controlled  Resume hydrochlorothiazide, amlodipine, hydralazine, metoprolol, lisinopril  Anemia  Stable.  No indication for transfusion at this time    Results from last 7 days   Lab Units 05/23/25  0525 05/22/25  1236   HEMOGLOBIN g/dL 10.9* 11.4*   MCV fL 84 84   RDW % 14.3 13.9       S/P bilateral above knee amputation (HCC)  Bilateral AKA, continue supportive care  Acquired hypothyroidism  Continue levothyroxine    VTE Pharmacologic Prophylaxis:   Moderate Risk (Score 3-4) - Pharmacological DVT Prophylaxis Contraindicated. Sequential Compression Devices Ordered.    Mobility:   Basic Mobility Inpatient Raw Score: 11  JH-HLM Goal: 4: Move to chair/commode  JH-HLM Achieved: 2: Bed activities/Dependent transfer  JH-HLM Goal NOT achieved. Continue with multidisciplinary rounding and encourage appropriate mobility to improve upon JH-HLM goals.    Patient Centered Rounds: I performed bedside rounds with nursing staff today.   Discussions with Specialists or Other Care Team Provider: dw surgery    Education and Discussions with Family / Patient: will update    Current Length of Stay: 1 day(s)  Current Patient Status: Inpatient   Certification Statement: The patient will continue to require additional inpatient hospital stay due to stump wound rt  Discharge Plan: Anticipate discharge in 48-72 hrs to rehab facility.    Code Status: Level 1 - Full Code    Subjective   Patient states that she is feeling better today but when you press on her right stump it hurts her  there is a wound there    Objective :  Temp:  [97.6 °F (36.4 °C)-98.5 °F (36.9 °C)] 97.6 °F (36.4 °C)  HR:  [63-66] 66  BP: (132-165)/(53-85) 132/53  Resp:  [16-18] 18  SpO2:  [91 %-96 %] 94 %  O2 Device: None (Room air)    Body mass index is 38.39 kg/m².     Input and Output Summary (last 24 hours):     Intake/Output Summary (Last 24 hours) at 5/23/2025 1007  Last data filed at 5/23/2025 0540  Gross per 24 hour   Intake --   Output 631 ml   Net -631 ml       Physical Exam  Vitals and nursing note reviewed.   Constitutional:       Appearance: Normal appearance.   HENT:      Head: Normocephalic and atraumatic.      Right Ear: External ear normal.      Left Ear: External ear normal.      Nose: Nose normal.      Mouth/Throat:      Pharynx: Oropharynx is clear.     Cardiovascular:      Rate and Rhythm: Normal rate and regular rhythm.      Heart sounds: Normal heart sounds.   Pulmonary:      Effort: Pulmonary effort is normal.      Breath sounds: Normal breath sounds.   Abdominal:      General: Bowel sounds are normal.      Palpations: Abdomen is soft.      Tenderness: There is no abdominal tenderness.     Musculoskeletal:         General: Normal range of motion.      Cervical back: Normal range of motion and neck supple.      Comments: Right above-knee amputation .rt Stump small wound noted with mild erythema around  Left above-knee amputation     Skin:     General: Skin is warm and dry.      Capillary Refill: Capillary refill takes less than 2 seconds.     Neurological:      General: No focal deficit present.      Mental Status: She is alert and oriented to person, place, and time.     Psychiatric:         Mood and Affect: Mood normal.           Lines/Drains:              Lab Results: I have reviewed the following results:   Results from last 7 days   Lab Units 05/23/25  0525   WBC Thousand/uL 12.55*   HEMOGLOBIN g/dL 10.9*   HEMATOCRIT % 34.0*   PLATELETS Thousands/uL 272   SEGS PCT % 75   LYMPHO PCT % 15   MONO PCT %  6   EOS PCT % 3     Results from last 7 days   Lab Units 05/23/25  0525   SODIUM mmol/L 139   POTASSIUM mmol/L 4.0   CHLORIDE mmol/L 106   CO2 mmol/L 24   BUN mg/dL 28*   CREATININE mg/dL 1.54*   ANION GAP mmol/L 9   CALCIUM mg/dL 8.9   ALBUMIN g/dL 3.5   TOTAL BILIRUBIN mg/dL 0.21   ALK PHOS U/L 69   ALT U/L 32   AST U/L 23   GLUCOSE RANDOM mg/dL 122     Results from last 7 days   Lab Units 05/22/25  1346   INR  1.47*     Results from last 7 days   Lab Units 05/23/25  0724 05/22/25  2103 05/22/25  1633   POC GLUCOSE mg/dl 133 196* 91         Results from last 7 days   Lab Units 05/22/25  1346   LACTIC ACID mmol/L 0.9       Recent Cultures (last 7 days):   Results from last 7 days   Lab Units 05/22/25  1346 05/22/25  1236   BLOOD CULTURE  Received in Microbiology Lab. Culture in Progress. Received in Microbiology Lab. Culture in Progress.   GRAM STAIN RESULT   --  1+ Polys  No bacteria seen       Imaging Results Review: I reviewed radiology reports from this admission including: CT abdomen/pelvis.  Other Study Results Review: EKG was reviewed.     Last 24 Hours Medication List:     Current Facility-Administered Medications:     acetaminophen (TYLENOL) tablet 650 mg, Q4H PRN    ALPRAZolam (XANAX) tablet 0.25 mg, Daily PRN    amiodarone tablet 100 mg, Daily    amLODIPine (NORVASC) tablet 10 mg, Daily    [Held by provider] apixaban (ELIQUIS) tablet 5 mg, BID    aspirin chewable tablet 81 mg, Daily    atorvastatin (LIPITOR) tablet 40 mg, Daily    calcium carbonate (TUMS) chewable tablet 1,000 mg, Q6H PRN    dextrose 5 % and sodium chloride 0.9 % infusion, Continuous    ferrous sulfate tablet 325 mg, Daily With Lunch    gabapentin (NEURONTIN) capsule 200 mg, BID    hydrALAZINE (APRESOLINE) tablet 100 mg, Q8H MICHELLE    hydroCHLOROthiazide tablet 25 mg, Daily    insulin glargine (LANTUS) subcutaneous injection 50 Units 0.5 mL, QAM    insulin lispro (HumALOG/ADMELOG) 100 units/mL subcutaneous injection 1-6 Units, Q6H **AND**  Fingerstick Glucose (POCT), Q6H    latanoprost (XALATAN) 0.005 % ophthalmic solution 1 drop, HS    levothyroxine tablet 62.5 mcg, Early Morning    lisinopril (ZESTRIL) tablet 40 mg, Daily    metoprolol tartrate (LOPRESSOR) tablet 50 mg, Q12H MICHELLE    oxymetazoline (AFRIN) 0.05 % 2 spray, patient supplied medication, Q12H PRN    polyethylene glycol (MIRALAX) packet 17 g, Daily PRN    saccharomyces boulardii (FLORASTOR) capsule 250 mg, BID    sertraline (ZOLOFT) tablet 100 mg, Daily    Administrative Statements   Today, Patient Was Seen By: Jennifer Ledezma MD      **Please Note: This note may have been constructed using a voice recognition system.**

## 2025-05-23 NOTE — ASSESSMENT & PLAN NOTE
Lab Results   Component Value Date    HGBA1C 9.1 (A) 03/19/2025   Significant risk factor for poor wound healing and infection.  -Recommend tight glycemic control per primary team  -Patient will need close follow-up with PCP and/endocrinology for better diabetic control long-term

## 2025-05-23 NOTE — CONSULTS
Consultation - Infectious Disease   Name: Dianna Varghese 64 y.o. female I MRN: [id number]  Unit/Bed#: E5 -01 I Date of Admission: 5/22/2025   Date of Service: 5/23/2025 I Hospital Day: 1   Inpatient consult to Infectious Diseases  Consult performed by: Frantz Jeter MD  Consult ordered by: Ajay Doe MD        Physician Requesting Evaluation: Jennifer Ledezma MD   Reason for Evaluation / Principal Problem: osteomyelitis of AKA stump    Assessment & Plan  Complication of amputated stump (HCC)  Patient presenting with draining sinus tract from right AKA site.  CT showing a thick-walled enhancing tract extending from anterior medial aspect of right lower extremity stump wound into distal right femoral stump.  This is suggestive of worsening chronic osteomyelitis.  On exam there is a sinus tract with mild purulent drainage noted but no current radiographic evidence of abscess and patient has no cellulitis noted on exam.  She had a mild leukocytosis, though has down-trended and she is afebrile.  She is to go to the OR today for operative exploration of the sinus tract and cultures.  Per vascular surgery, if probing deep to bone then will consider revision with higher above-the-knee amputation.  She is at high risk for further nonhealing so palliative wound care options also being considered.  Per [facility] wound culture from 5/22 with 2+ GNR's.  On review of prior cultures patient has grown and MDR Enterobacter (1/17/23, 1/27/24), Enterococcus (1/2023), MSSA (1/24/23), and Candida albicans.   - As patient is not septic, afebrile and without evidence of acute cellulitis or abscess on CT recommend monitoring closely off antibiotic for now  -As we may not be able to obtain surgical cure of the osteomyelitis, patient may require long-term postop antibiotic and thus holding antibiotic will allow for accurate culture results to guide antibiotic choice  -After OR cultures obtained, can start empiric IV Ertapenem 1g daily and  IV Vancomycin with pharmacy consult  -Follow up OR cultures to further adjust antibiotics  -Follow up operative findings  S/P bilateral above knee amputation (HCC)  In setting of PAD.  DM (diabetes mellitus) type II, controlled, with peripheral vascular disorder (HCC)  Lab Results   Component Value Date    HGBA1C 9.1 (A) 03/19/2025   Significant risk factor for poor wound healing and infection.  -Recommend tight glycemic control per primary team  -Patient will need close follow-up with PCP and/endocrinology for better diabetic control long-term  PAD (peripheral artery disease) ([facility])    I have discussed the above management plan in detail with the primary service.     Antibiotics:  None    History of Present Illness   Dianna Varghese is a 64 y.o. year old female with type 2 diabetes, peripheral arterial disease status post right AKA complicated by stump abscess/osteomyelitis.    Patient was seen by our ID team back in 2023 where she had right AKA stump abscess and osteomyelitis.  Cultures were polymicrobial with a resistant Enterobacter and Enterococcus faecalis in the blood as well as OR cultures that grew Enterobacter, Enterococcus and Candida albicans.  She ended up completing 6 weeks of Augmentin, doxycycline and Isavuconazole (Cresemba).     Patient presented to the ER yesterday from her assisted living facility for a wound check.  He reportedly has had 2 wounds of the right AKA site that started about 1 month ago.  1 has healed however 1 persists.  On arrival she had mild leukocytosis but no fever.  The sinus tract present on her right AKA with very mild purulent drainage.  No surrounding erythema.  Contrast-enhanced CT scan confirmed a thick-walled enhancing tract extending from the anterior medial aspect of the right lower extremity stump wound into the distal right femoral stump, concerning for progressive osteomyelitis.  Patient is to go to the OR today for wound exploration and cultures.    She denies  ever having any fevers or chills.  Denies any major pain at the site.      A complete review of systems is negative other than that noted in the HPI.    Medical History Review: I have reviewed the patient's PMH, PSH, Social History, Family History, Meds, and Allergies     Objective :  Temp:  [97.6 °F (36.4 °C)-98.5 °F (36.9 °C)] 97.6 °F (36.4 °C)  HR:  [63-67] 67  BP: (131-165)/(51-85) 131/51  Resp:  [16-18] 18  SpO2:  [91 %-96 %] 94 %  O2 Device: None (Room air)    General:  No acute distress  Psychiatric:  Awake and alert  Pulmonary:  Normal respiratory excursion without accessory muscle use  Abdomen:  Soft, nontender  Extremities:  No edema  Skin:  No rashes      Lab Results: I have reviewed the following results:  Results from last 7 days   Lab Units 05/23/25  0525 05/22/25  1236   WBC Thousand/uL 12.55* 13.18*   HEMOGLOBIN g/dL 10.9* 11.4*   PLATELETS Thousands/uL 272 296     Results from last 7 days   Lab Units 05/23/25  0525 05/22/25  1236   SODIUM mmol/L 139 136   POTASSIUM mmol/L 4.0 4.9   CHLORIDE mmol/L 106 103   CO2 mmol/L 24 25   BUN mg/dL 28* 24   CREATININE mg/dL 1.54* 1.18   EGFR ml/min/1.73sq m 35 49   CALCIUM mg/dL 8.9 9.0   AST U/L 23 30   ALT U/L 32 36   ALK PHOS U/L 69 74   ALBUMIN g/dL 3.5 3.5     Results from last 7 days   Lab Units 05/22/25  1346 05/22/25  1236   BLOOD CULTURE  Received in Microbiology Lab. Culture in Progress. Received in Microbiology Lab. Culture in Progress.   GRAM STAIN RESULT   --  1+ Polys  No bacteria seen                       Imaging Results Review: I personally reviewed the following image studies in PACS and associated radiology reports: CT pelvis 5/22. My interpretation of the radiology images/reports is: Thick-walled enhancing tract extending from anterior medial aspect of right lower extremity stump wound into distal right femoral stump.

## 2025-05-24 LAB
ANION GAP SERPL CALCULATED.3IONS-SCNC: 9 MMOL/L (ref 4–13)
BACTERIA WND AEROBE CULT: ABNORMAL
BACTERIA WND AEROBE CULT: ABNORMAL
BUN SERPL-MCNC: 27 MG/DL (ref 5–25)
CALCIUM SERPL-MCNC: 8.7 MG/DL (ref 8.4–10.2)
CHLORIDE SERPL-SCNC: 106 MMOL/L (ref 96–108)
CO2 SERPL-SCNC: 24 MMOL/L (ref 21–32)
CREAT SERPL-MCNC: 1.54 MG/DL (ref 0.6–1.3)
ERYTHROCYTE [DISTWIDTH] IN BLOOD BY AUTOMATED COUNT: 14.6 % (ref 11.6–15.1)
GFR SERPL CREATININE-BSD FRML MDRD: 35 ML/MIN/1.73SQ M
GLUCOSE SERPL-MCNC: 128 MG/DL (ref 65–140)
GLUCOSE SERPL-MCNC: 151 MG/DL (ref 65–140)
GLUCOSE SERPL-MCNC: 151 MG/DL (ref 65–140)
GLUCOSE SERPL-MCNC: 178 MG/DL (ref 65–140)
GLUCOSE SERPL-MCNC: 90 MG/DL (ref 65–140)
GRAM STN SPEC: ABNORMAL
HCT VFR BLD AUTO: 33 % (ref 34.8–46.1)
HGB BLD-MCNC: 10.7 G/DL (ref 11.5–15.4)
MCH RBC QN AUTO: 27.3 PG (ref 26.8–34.3)
MCHC RBC AUTO-ENTMCNC: 32.4 G/DL (ref 31.4–37.4)
MCV RBC AUTO: 84 FL (ref 82–98)
PLATELET # BLD AUTO: 288 THOUSANDS/UL (ref 149–390)
PMV BLD AUTO: 9.1 FL (ref 8.9–12.7)
POTASSIUM SERPL-SCNC: 3.8 MMOL/L (ref 3.5–5.3)
RBC # BLD AUTO: 3.92 MILLION/UL (ref 3.81–5.12)
SODIUM SERPL-SCNC: 139 MMOL/L (ref 135–147)
VANCOMYCIN SERPL-MCNC: 18.6 UG/ML (ref 10–20)
WBC # BLD AUTO: 12.16 THOUSAND/UL (ref 4.31–10.16)

## 2025-05-24 PROCEDURE — 99232 SBSQ HOSP IP/OBS MODERATE 35: CPT | Performed by: INTERNAL MEDICINE

## 2025-05-24 PROCEDURE — 80048 BASIC METABOLIC PNL TOTAL CA: CPT

## 2025-05-24 PROCEDURE — 85027 COMPLETE CBC AUTOMATED: CPT

## 2025-05-24 PROCEDURE — 99232 SBSQ HOSP IP/OBS MODERATE 35: CPT

## 2025-05-24 PROCEDURE — 82948 REAGENT STRIP/BLOOD GLUCOSE: CPT

## 2025-05-24 PROCEDURE — 80202 ASSAY OF VANCOMYCIN: CPT | Performed by: INTERNAL MEDICINE

## 2025-05-24 PROCEDURE — G0545 PR INHERENT VISIT TO INPT: HCPCS | Performed by: INTERNAL MEDICINE

## 2025-05-24 RX ORDER — NYSTATIN 100000 [USP'U]/G
POWDER TOPICAL 2 TIMES DAILY
Status: DISCONTINUED | OUTPATIENT
Start: 2025-05-24 | End: 2025-05-28 | Stop reason: HOSPADM

## 2025-05-24 RX ORDER — VANCOMYCIN HYDROCHLORIDE 1 G/200ML
1000 INJECTION, SOLUTION INTRAVENOUS ONCE
Status: COMPLETED | OUTPATIENT
Start: 2025-05-24 | End: 2025-05-24

## 2025-05-24 RX ADMIN — INSULIN GLARGINE 50 UNITS: 100 INJECTION, SOLUTION SUBCUTANEOUS at 09:59

## 2025-05-24 RX ADMIN — METOPROLOL TARTRATE 50 MG: 50 TABLET, FILM COATED ORAL at 20:58

## 2025-05-24 RX ADMIN — INSULIN LISPRO 1 UNITS: 100 INJECTION, SOLUTION INTRAVENOUS; SUBCUTANEOUS at 20:58

## 2025-05-24 RX ADMIN — NYSTATIN: 100000 POWDER TOPICAL at 16:36

## 2025-05-24 RX ADMIN — ATORVASTATIN CALCIUM 40 MG: 40 TABLET, FILM COATED ORAL at 09:49

## 2025-05-24 RX ADMIN — APIXABAN 5 MG: 5 TABLET, FILM COATED ORAL at 09:49

## 2025-05-24 RX ADMIN — LATANOPROST 1 DROP: 50 SOLUTION OPHTHALMIC at 20:58

## 2025-05-24 RX ADMIN — HYDRALAZINE HYDROCHLORIDE 100 MG: 50 TABLET ORAL at 13:04

## 2025-05-24 RX ADMIN — LISINOPRIL 40 MG: 20 TABLET ORAL at 09:49

## 2025-05-24 RX ADMIN — HYDROCHLOROTHIAZIDE 25 MG: 25 TABLET ORAL at 09:49

## 2025-05-24 RX ADMIN — OXYMETAZOLINE HYDROCHLORIDE 2 SPRAY: 0.05 SPRAY NASAL at 09:59

## 2025-05-24 RX ADMIN — INSULIN LISPRO 1 UNITS: 100 INJECTION, SOLUTION INTRAVENOUS; SUBCUTANEOUS at 12:01

## 2025-05-24 RX ADMIN — GABAPENTIN 200 MG: 100 CAPSULE ORAL at 20:58

## 2025-05-24 RX ADMIN — Medication 250 MG: at 09:49

## 2025-05-24 RX ADMIN — ERTAPENEM SODIUM 1000 MG: 1 INJECTION INTRAMUSCULAR; INTRAVENOUS at 16:36

## 2025-05-24 RX ADMIN — HYDRALAZINE HYDROCHLORIDE 100 MG: 50 TABLET ORAL at 21:04

## 2025-05-24 RX ADMIN — Medication 250 MG: at 20:58

## 2025-05-24 RX ADMIN — GABAPENTIN 200 MG: 100 CAPSULE ORAL at 09:49

## 2025-05-24 RX ADMIN — NYSTATIN: 100000 POWDER TOPICAL at 11:39

## 2025-05-24 RX ADMIN — HYDRALAZINE HYDROCHLORIDE 100 MG: 50 TABLET ORAL at 06:14

## 2025-05-24 RX ADMIN — ASPIRIN 81 MG CHEWABLE TABLET 81 MG: 81 TABLET CHEWABLE at 09:49

## 2025-05-24 RX ADMIN — VANCOMYCIN HYDROCHLORIDE 1000 MG: 1 INJECTION, SOLUTION INTRAVENOUS at 13:01

## 2025-05-24 RX ADMIN — AMLODIPINE BESYLATE 10 MG: 10 TABLET ORAL at 09:49

## 2025-05-24 RX ADMIN — AMIODARONE HYDROCHLORIDE 100 MG: 100 TABLET ORAL at 09:49

## 2025-05-24 RX ADMIN — LEVOTHYROXINE SODIUM 62.5 MCG: 0.12 TABLET ORAL at 06:14

## 2025-05-24 RX ADMIN — METOPROLOL TARTRATE 50 MG: 50 TABLET, FILM COATED ORAL at 09:49

## 2025-05-24 RX ADMIN — SERTRALINE HYDROCHLORIDE 100 MG: 100 TABLET ORAL at 09:49

## 2025-05-24 RX ADMIN — FERROUS SULFATE TAB 325 MG (65 MG ELEMENTAL FE) 325 MG: 325 (65 FE) TAB at 12:01

## 2025-05-24 RX ADMIN — APIXABAN 5 MG: 5 TABLET, FILM COATED ORAL at 20:58

## 2025-05-24 RX ADMIN — OXYMETAZOLINE HYDROCHLORIDE 2 SPRAY: 0.05 SPRAY NASAL at 20:57

## 2025-05-24 RX ADMIN — INSULIN LISPRO 1 UNITS: 100 INJECTION, SOLUTION INTRAVENOUS; SUBCUTANEOUS at 16:35

## 2025-05-24 NOTE — PROGRESS NOTES
Progress Note - Infectious Disease   Name: Dianna Varghese 64 y.o. female I MRN: [id number]  Unit/Bed#: E5 -01 I Date of Admission: 5/22/2025   Date of Service: 5/24/2025 I Hospital Day: 2    Assessment & Plan  Complication of amputated stump (HCC)  Patient presenting with draining sinus tract from right AKA site.  CT showing a thick-walled enhancing tract extending from anterior medial aspect of right lower extremity stump wound into distal right femoral stump. On exam there is a sinus tract with mild purulent drainage noted but no current radiographic evidence of abscess and patient has no cellulitis noted on exam.  She had a mild leukocytosis, though has down-trended and she is afebrile.  Patient is status post I&D of AKA stump on 5/23.  At surgery, there was no bone encountered.  Therefore, osteomyelitis is unlikely.  Wound culture from 5/22 with 2+ GNR's.  On review of prior cultures patient has grown and MDR Enterobacter (1/17/23, 1/27/24), Enterococcus (1/2023), MSSA (1/24/23), and Candida albicans.  Patient remains clinically and systemically, without sepsis or systemic toxicity.  - Continue IV vancomycin/ertapenem for now  -Follow up OR cultures to further adjust antibiotics  - Wound care per vascular surgery  S/P bilateral above knee amputation (HCC)  In setting of PAD.  DM (diabetes mellitus) type II, controlled, with peripheral vascular disorder (HCC)  Lab Results   Component Value Date    HGBA1C 9.1 (A) 03/19/2025   Significant risk factor for poor wound healing and infection.  -Recommend tight glycemic control per primary team  -Patient will need close follow-up with PCP and/endocrinology for better diabetic control long-term    Discussed with patient in detail regarding the above plan.      Antibiotics:  Vancomycin/ertapenem  POD # 1    Subjective   Patient with pain in her right AKA stump, controlled.  Temperature stays down.  No chills.  She is tolerating antibiotics well.  No nausea, vomiting  or diarrhea.    Objective :  Temp:  [98 °F (36.7 °C)-98.7 °F (37.1 °C)] 98 °F (36.7 °C)  HR:  [64-74] 72  BP: (116-160)/(44-97) 126/52  Resp:  [15-18] 16  SpO2:  [89 %-97 %] 94 %  O2 Device: None (Room air)  Nasal Cannula O2 Flow Rate (L/min):  [2 L/min] 2 L/min    Physical Exam:     General: Awake, alert, cooperative, no distress.   Neck:  Supple. No mass.  No lymphadenopathy.   Lungs: Expansion symmetric, no rales, no wheezing, respirations unlabored.   Heart:  Regular rate and rhythm, S1 and S2 normal, no murmur.   Abdomen: Soft, nondistended, non-tender, bowel sounds active all four quadrants, no masses, no organomegaly.   Extremities: No edema.  AKA wound with dressing in place.  Dressing is dry.  No erythema/warmth.  Mild to moderate tenderness.   Skin:  No rash.   Neuro: Moves all extremities.        Lab Results: I have reviewed the following results:  Results from last 7 days   Lab Units 05/24/25  0435 05/23/25  0525 05/22/25  1236   WBC Thousand/uL 12.16* 12.55* 13.18*   HEMOGLOBIN g/dL 10.7* 10.9* 11.4*   PLATELETS Thousands/uL 288 272 296     Results from last 7 days   Lab Units 05/24/25  0435 05/23/25  0525 05/22/25  1236   SODIUM mmol/L 139 139 136   POTASSIUM mmol/L 3.8 4.0 4.9   CHLORIDE mmol/L 106 106 103   CO2 mmol/L 24 24 25   BUN mg/dL 27* 28* 24   CREATININE mg/dL 1.54* 1.54* 1.18   EGFR ml/min/1.73sq m 35 35 49   CALCIUM mg/dL 8.7 8.9 9.0   AST U/L  --  23 30   ALT U/L  --  32 36   ALK PHOS U/L  --  69 74   ALBUMIN g/dL  --  3.5 3.5     Results from last 7 days   Lab Units 05/23/25  1510 05/22/25  1641 05/22/25  1346 05/22/25  1236   BLOOD CULTURE   --   --  No Growth at 24 hrs. No Growth at 24 hrs.   GRAM STAIN RESULT  1+ Disintegrating polys  No bacteria seen  --   --  1+ Polys  No bacteria seen   WOUND CULTURE   --   --   --  2+ Growth of Enterobacter cloacae*   MRSA CULTURE ONLY   --  No Methicillin Resistant Staphlyococcus aureus (MRSA) isolated  --   --

## 2025-05-24 NOTE — PROGRESS NOTES
Dianna Varghese is a 64 y.o. female who is currently ordered Vancomycin IV with management by the Pharmacy Consult service.  Relevant clinical data and objective / subjective history reviewed.  Vancomycin Assessment:  Indication and Goal AUC/Trough: Bone/joint infection (goal -600, trough >10)  Clinical Status: stable  Micro:     Renal Function:  SCr: 1.54 mg/dL  CrCl: 41.8 mL/min  Renal replacement: Not on dialysis  Days of Therapy: 2  Current Dose: Pulse dosing at 10mg/kg when vanco levels <15  Vancomycin Plan:  New Dosing: Continue pulse dosing, given 1 dose of 1000 mg IV at 1330  Next Level: 5/25 with AM labs   Renal Function Monitoring: Daily BMP and UOP  Pharmacy will continue to follow closely for s/sx of nephrotoxicity, infusion reactions and appropriateness of therapy.  BMP and CBC will be ordered per protocol. We will continue to follow the patient’s culture results and clinical progress daily.    Amanda Beltran, Pharmacist

## 2025-05-24 NOTE — PROGRESS NOTES
Progress Note - Hospitalist   Name: Dianna Varghese 64 y.o. female I MRN: [id number]  Unit/Bed#: E5 -01 I Date of Admission: 5/22/2025   Date of Service: 5/24/2025 I Hospital Day: 2    Assessment & Plan  Complication of amputated stump (HCC)  64-year-old female with a history of hypertension, hyperlipidemia, type 2 diabetes mellitus, CKD 3, CVA, and PAD status post bilateral AKA who was sent in here from [facility] due to right AKA stump wound.  Per vascular surgery recommendations appreciated.    Status post washout by vascular team   .  ID on board  Continue Vanco and ertapenem      PAD (peripheral artery disease) (HCC)  Status post left AKA 11/2022  Status post right AKA stump revision 1/2023  Continue aspirin, statin, hold eliquis for surgery (On Eliquis for PAD / stroke. Of note, patient had a prior history of HIT)  Resume eliquis tomorrow if ok with surgery  Esophageal reflux  Continue as needed calcium carbonate.  Pantoprazole currently not in her medication list it appears that this has been recently discontinued as per documentation.  DM (diabetes mellitus) type II, controlled, with peripheral vascular disorder (HCC)  Lab Results   Component Value Date    HGBA1C 9.1 (A) 03/19/2025       Recent Labs     05/23/25  1205 05/23/25  1548 05/23/25  2102 05/24/25  0713   POCGLU 123 89 162* 128       Blood Sugar Average: Last 72 hrs:  (P) 131.8837881427362675  Home regimen is glargine 65 units at bedtime.  Semaglutide on hold. Decrease home glargine to 50U HS while inpatient and continue with sliding scale every 6 hours while npo today   Watch for hypoglycemia  Placed on gentle hydration with dextrose and saline at 30 cc/h  Anxiety and depression  Continue sertraline and as needed Xanax  Hypertension  Bp controlled  Resume hydrochlorothiazide, amlodipine, hydralazine, metoprolol, lisinopril  Anemia  Stable.  No indication for transfusion at this time    Results from last 7 days   Lab Units 05/24/25  0435  05/23/25  0525 05/22/25  1236   HEMOGLOBIN g/dL 10.7* 10.9* 11.4*   MCV fL 84 84 84   RDW % 14.6 14.3 13.9       S/P bilateral above knee amputation (HCC)  Bilateral AKA, continue supportive care  Acquired hypothyroidism  Continue levothyroxine    VTE Pharmacologic Prophylaxis:   Moderate Risk (Score 3-4) - Pharmacological DVT Prophylaxis Contraindicated. Sequential Compression Devices Ordered.    Mobility:   Basic Mobility Inpatient Raw Score: 10  -[facility] Goal: 4: Move to chair/commode  JH-HLM Achieved: 1: Laying in bed  -HLM Goal achieved. Continue to encourage appropriate mobility.    Patient Centered Rounds: I performed bedside rounds with nursing staff today.   Discussions with Specialists or Other Care Team Provider: cm, nursing    Education and Discussions with Family / Patient: Patient declined call to .     Current Length of Stay: 2 day(s)  Current Patient Status: Inpatient   Certification Statement: The patient will continue to require additional inpatient hospital stay due to see below  Discharge Plan: Requiring IV antibiotics and further vascular evaluation.  Expect greater than 48 hours    Code Status: Level 1 - Full Code    Subjective   Denies chest pain, shortness of breath, cough or fevers, chills    Objective :  Temp:  [98 °F (36.7 °C)-98.7 °F (37.1 °C)] 98.3 °F (36.8 °C)  HR:  [64-74] 70  BP: (116-160)/(44-97) 123/49  Resp:  [15-18] 15  SpO2:  [89 %-97 %] 92 %  O2 Device: None (Room air)  Nasal Cannula O2 Flow Rate (L/min):  [2 L/min] 2 L/min    Body mass index is 38.39 kg/m².     Input and Output Summary (last 24 hours):     Intake/Output Summary (Last 24 hours) at 5/24/2025 1028  Last data filed at 5/24/2025 0824  Gross per 24 hour   Intake 310 ml   Output --   Net 310 ml       Physical Exam  Constitutional:       General: She is not in acute distress.     Appearance: She is well-developed. She is not diaphoretic.   HENT:      Head: Normocephalic and atraumatic.      Nose: Nose  normal.      Mouth/Throat:      Pharynx: No oropharyngeal exudate.     Eyes:      General: No scleral icterus.        Right eye: No discharge.         Left eye: No discharge.      Conjunctiva/sclera: Conjunctivae normal.     Neck:      Thyroid: No thyromegaly.      Vascular: No JVD.     Cardiovascular:      Rate and Rhythm: Normal rate and regular rhythm.      Heart sounds: Normal heart sounds. No murmur heard.     No friction rub. No gallop.   Pulmonary:      Effort: Pulmonary effort is normal. No respiratory distress.      Breath sounds: Normal breath sounds. No wheezing or rales.   Chest:      Chest wall: No tenderness.   Abdominal:      General: Bowel sounds are normal. There is no distension.      Palpations: Abdomen is soft.      Tenderness: There is no abdominal tenderness. There is no guarding or rebound.     Musculoskeletal:         General: No tenderness or deformity. Normal range of motion.      Cervical back: Normal range of motion and neck supple.     Skin:     General: Skin is warm and dry.      Findings: No erythema or rash.     Neurological:      Mental Status: She is alert. Mental status is at baseline.      Cranial Nerves: No cranial nerve deficit.      Sensory: No sensory deficit.      Motor: No abnormal muscle tone.      Coordination: Coordination normal.           Lines/Drains:              Lab Results: I have reviewed the following results:   Results from last 7 days   Lab Units 05/24/25  0435 05/23/25  0525   WBC Thousand/uL 12.16* 12.55*   HEMOGLOBIN g/dL 10.7* 10.9*   HEMATOCRIT % 33.0* 34.0*   PLATELETS Thousands/uL 288 272   SEGS PCT %  --  75   LYMPHO PCT %  --  15   MONO PCT %  --  6   EOS PCT %  --  3     Results from last 7 days   Lab Units 05/24/25  0435 05/23/25  0525   SODIUM mmol/L 139 139   POTASSIUM mmol/L 3.8 4.0   CHLORIDE mmol/L 106 106   CO2 mmol/L 24 24   BUN mg/dL 27* 28*   CREATININE mg/dL 1.54* 1.54*   ANION GAP mmol/L 9 9   CALCIUM mg/dL 8.7 8.9   ALBUMIN g/dL  --  3.5    TOTAL BILIRUBIN mg/dL  --  0.21   ALK PHOS U/L  --  69   ALT U/L  --  32   AST U/L  --  23   GLUCOSE RANDOM mg/dL 90 122     Results from last 7 days   Lab Units 05/23/25  1251   INR  1.40*     Results from last 7 days   Lab Units 05/24/25  0713 05/23/25  2102 05/23/25  1548 05/23/25  1205 05/23/25  0724 05/22/25  2103 05/22/25  1633   POC GLUCOSE mg/dl 128 162* 89 123 133 196* 91         Results from last 7 days   Lab Units 05/22/25  1346   LACTIC ACID mmol/L 0.9       Recent Cultures (last 7 days):   Results from last 7 days   Lab Units 05/23/25  1510 05/22/25  1346 05/22/25  1236   BLOOD CULTURE   --  No Growth at 24 hrs. No Growth at 24 hrs.   GRAM STAIN RESULT  1+ Disintegrating polys  No bacteria seen  --  1+ Polys  No bacteria seen   WOUND CULTURE   --   --  2+ Growth of Enterobacter cloacae*       Imaging Results Review: I personally reviewed the following image studies/reports in PACS and discussed pertinent findings with Radiology: chest xray. My interpretation of the radiology images/reports is:  .  Other Study Results Review: EKG was reviewed.     Last 24 Hours Medication List:     Current Facility-Administered Medications:     acetaminophen (TYLENOL) tablet 650 mg, Q4H PRN    ALPRAZolam (XANAX) tablet 0.25 mg, Daily PRN    amiodarone tablet 100 mg, Daily    amLODIPine (NORVASC) tablet 10 mg, Daily    apixaban (ELIQUIS) tablet 5 mg, BID    aspirin chewable tablet 81 mg, Daily    atorvastatin (LIPITOR) tablet 40 mg, Daily    calcium carbonate (TUMS) chewable tablet 1,000 mg, Q6H PRN    ertapenem (INVanz) 1,000 mg in sodium chloride 0.9 % 50 mL IVPB, Q24H, Last Rate: 1,000 mg (05/23/25 1810)    ferrous sulfate tablet 325 mg, Daily With Lunch    gabapentin (NEURONTIN) capsule 200 mg, BID    hydrALAZINE (APRESOLINE) tablet 100 mg, Q8H MICHELLE    hydroCHLOROthiazide tablet 25 mg, Daily    insulin glargine (LANTUS) subcutaneous injection 50 Units 0.5 mL, QAM    insulin lispro (HumALOG/ADMELOG) 100 units/mL  subcutaneous injection 1-6 Units, 4x Daily (AC & HS) **AND** Fingerstick Glucose (POCT), 4x Daily AC and at bedtime    latanoprost (XALATAN) 0.005 % ophthalmic solution 1 drop, HS    levothyroxine tablet 62.5 mcg, Early Morning    lisinopril (ZESTRIL) tablet 40 mg, Daily    metoprolol tartrate (LOPRESSOR) tablet 50 mg, Q12H MICHELLE    oxymetazoline (AFRIN) 0.05 % 2 spray, patient supplied medication, Q12H PRN    polyethylene glycol (MIRALAX) packet 17 g, Daily PRN    saccharomyces boulardii (FLORASTOR) capsule 250 mg, BID    sertraline (ZOLOFT) tablet 100 mg, Daily    vancomycin (VANCOCIN) IVPB (premix in dextrose) 1,000 mg 200 mL, Daily PRN    Administrative Statements   Today, Patient Was Seen By: Varun Awad MD  I have spent a total time of 30 minutes in caring for this patient on the day of the visit/encounter including Diagnostic results.    **Please Note: This note may have been constructed using a voice recognition system.**

## 2025-05-24 NOTE — ASSESSMENT & PLAN NOTE
Patient presenting with draining sinus tract from right AKA site.  CT showing a thick-walled enhancing tract extending from anterior medial aspect of right lower extremity stump wound into distal right femoral stump. On exam there is a sinus tract with mild purulent drainage noted but no current radiographic evidence of abscess and patient has no cellulitis noted on exam.  She had a mild leukocytosis, though has down-trended and she is afebrile.  Patient is status post I&D of AKA stump on 5/23.  At surgery, there was no bone encountered.  Therefore, osteomyelitis is unlikely.  Wound culture from 5/22 with 2+ GNR's.  On review of prior cultures patient has grown and MDR Enterobacter (1/17/23, 1/27/24), Enterococcus (1/2023), MSSA (1/24/23), and Candida albicans.  Patient remains clinically and systemically, without sepsis or systemic toxicity.  - Continue IV vancomycin/ertapenem for now  -Follow up OR cultures to further adjust antibiotics  - Wound care per vascular surgery

## 2025-05-24 NOTE — PLAN OF CARE
Problem: Potential for Falls  Goal: Patient will remain free of falls  Description: INTERVENTIONS:  - Educate patient/family on patient safety including physical limitations  - Instruct patient to call for assistance with activity   - Consider consulting OT/PT to assist with strengthening/mobility based on AM PAC & JH-HLM score  - Consult OT/PT to assist with strengthening/mobility   - Keep Call bell within reach  - Keep bed low and locked with side rails adjusted as appropriate  - Keep care items and personal belongings within reach  - Initiate and maintain comfort rounds  - Make Fall Risk Sign visible to staff  - Offer Toileting every 2 Hours, in advance of need  - Initiate/Maintain BED alarm  - Apply yellow socks and bracelet for high fall risk patients  - Consider moving patient to room near nurses station  Outcome: Progressing     Problem: Prexisting or High Potential for Compromised Skin Integrity  Goal: Skin integrity is maintained or improved  Description: INTERVENTIONS:  - Identify patients at risk for skin breakdown  - Assess and monitor skin integrity including under and around medical devices   - Assess and monitor nutrition and hydration status  - Monitor labs  - Assess for incontinence   - Turn and reposition patient  - Assist with mobility/ambulation  - Relieve pressure over lesly prominences   - Avoid friction and shearing  - Provide appropriate hygiene as needed including keeping skin clean and dry  - Evaluate need for skin moisturizer/barrier cream  - Collaborate with interdisciplinary team  - Patient/family teaching  - Consider wound care consult    Assess:  - Review [place] scale daily  - Inspect skin when repositioning, toileting, and assisting with ADLS  Outcome: Progressing     Problem: PAIN - ADULT  Goal: Verbalizes/displays adequate comfort level or baseline comfort level  Description: Interventions:  - Encourage patient to monitor pain and request assistance  - Assess pain using  appropriate pain scale  - Administer analgesics as ordered based on type and severity of pain and evaluate response  - Implement non-pharmacological measures as appropriate and evaluate response  - Consider cultural and social influences on pain and pain management  - Notify physician/advanced practitioner if interventions unsuccessful or patient reports new pain  - Educate patient/family on pain management process including their role and importance of  reporting pain   - Provide non-pharmacologic/complimentary pain relief interventions  Outcome: Progressing     Problem: INFECTION - ADULT  Goal: Absence or prevention of progression during hospitalization  Description: INTERVENTIONS:  - Assess and monitor for signs and symptoms of infection  - Monitor lab/diagnostic results  - Monitor all insertion sites, i.e. indwelling lines, tubes, and drains  - Monitor endotracheal if appropriate and nasal secretions for changes in amount and color  - [place] appropriate cooling/warming therapies per order  - Administer medications as ordered  - Instruct and encourage patient and family to use good hand hygiene technique  - Identify and instruct in appropriate isolation precautions for identified infection/condition  Outcome: Progressing  Goal: Absence of fever/infection during neutropenic period  Description: INTERVENTIONS:  - Monitor WBC  - Perform strict hand hygiene  - Limit to healthy visitors only  - No plants, dried, fresh or silk flowers with hopkins in patient room  Outcome: Progressing     Problem: SAFETY ADULT  Goal: Maintain or return to baseline ADL function  Description: INTERVENTIONS:  -  Assess patient's ability to carry out ADLs; assess patient's baseline for ADL function and identify physical deficits which impact ability to perform ADLs (bathing, care of mouth/teeth, toileting, grooming, dressing, etc.)  - Assess/evaluate cause of self-care deficits   - Assess range of motion  - Assess patient's mobility;  develop plan if impaired  - Assess patient's need for assistive devices and provide as appropriate  - Encourage maximum independence but intervene and supervise when necessary  - Involve family in performance of ADLs  - Assess for home care needs following discharge   - Consider OT consult to assist with ADL evaluation and planning for discharge  - Provide patient education as appropriate  - Monitor functional capacity and physical performance, use of AM PAC & JH-HLM   - Monitor gait, balance and fatigue with ambulation    Outcome: Progressing     Problem: DISCHARGE PLANNING  Goal: Discharge to home or other facility with appropriate resources  Description: INTERVENTIONS:  - Identify barriers to discharge w/patient and caregiver  - Arrange for needed discharge resources and transportation as appropriate  - Identify discharge learning needs (meds, wound care, etc.)  - Arrange for interpretive services to assist at discharge as needed  - Refer to Case Management Department for coordinating discharge planning if the patient needs post-hospital services based on physician/advanced practitioner order or complex needs related to functional status, cognitive ability, or social support system  Outcome: Progressing     Problem: Knowledge Deficit  Goal: Patient/family/caregiver demonstrates understanding of disease process, treatment plan, medications, and discharge instructions  Description: Complete learning assessment and assess knowledge base.  Interventions:  - Provide teaching at level of understanding  - Provide teaching via preferred learning methods  Outcome: Progressing

## 2025-05-24 NOTE — PLAN OF CARE
Problem: Potential for Falls  Goal: Patient will remain free of falls  Description: INTERVENTIONS:  - Educate patient/family on patient safety including physical limitations  - Instruct patient to call for assistance with activity   - Consider consulting OT/PT to assist with strengthening/mobility based on AM PAC & JH-HLM score  - Consult OT/PT to assist with strengthening/mobility   - Keep Call bell within reach  - Keep bed low and locked with side rails adjusted as appropriate  - Keep care items and personal belongings within reach  - Initiate and maintain comfort rounds  - Make Fall Risk Sign visible to staff  - Offer Toileting every 2 Hours, in advance of need  - Initiate/Maintain BED alarm  - Apply yellow socks and bracelet for high fall risk patients  - Consider moving patient to room near nurses station  Outcome: Progressing     Problem: Prexisting or High Potential for Compromised Skin Integrity  Goal: Skin integrity is maintained or improved  Description: INTERVENTIONS:  - Identify patients at risk for skin breakdown  - Assess and monitor skin integrity including under and around medical devices   - Assess and monitor nutrition and hydration status  - Monitor labs  - Assess for incontinence   - Turn and reposition patient  - Assist with mobility/ambulation  - Relieve pressure over lesly prominences   - Avoid friction and shearing  - Provide appropriate hygiene as needed including keeping skin clean and dry  - Evaluate need for skin moisturizer/barrier cream  - Collaborate with interdisciplinary team  - Patient/family teaching  - Consider wound care consult    Assess:  - Review Nicanor scale daily  - Inspect skin when repositioning, toileting, and assisting with ADLS  Outcome: Progressing     Problem: PAIN - ADULT  Goal: Verbalizes/displays adequate comfort level or baseline comfort level  Description: Interventions:  - Encourage patient to monitor pain and request assistance  - Assess pain using  appropriate pain scale  - Administer analgesics as ordered based on type and severity of pain and evaluate response  - Implement non-pharmacological measures as appropriate and evaluate response  - Consider cultural and social influences on pain and pain management  - Notify physician/advanced practitioner if interventions unsuccessful or patient reports new pain  - Educate patient/family on pain management process including their role and importance of  reporting pain   - Provide non-pharmacologic/complimentary pain relief interventions  Outcome: Progressing     Problem: INFECTION - ADULT  Goal: Absence or prevention of progression during hospitalization  Description: INTERVENTIONS:  - Assess and monitor for signs and symptoms of infection  - Monitor lab/diagnostic results  - Monitor all insertion sites, i.e. indwelling lines, tubes, and drains  - Monitor endotracheal if appropriate and nasal secretions for changes in amount and color  - [place] appropriate cooling/warming therapies per order  - Administer medications as ordered  - Instruct and encourage patient and family to use good hand hygiene technique  - Identify and instruct in appropriate isolation precautions for identified infection/condition  Outcome: Progressing  Goal: Absence of fever/infection during neutropenic period  Description: INTERVENTIONS:  - Monitor WBC  - Perform strict hand hygiene  - Limit to healthy visitors only  - No plants, dried, fresh or silk flowers with hopkins in patient room  Outcome: Progressing     Problem: SAFETY ADULT  Goal: Maintain or return to baseline ADL function  Description: INTERVENTIONS:  -  Assess patient's ability to carry out ADLs; assess patient's baseline for ADL function and identify physical deficits which impact ability to perform ADLs (bathing, care of mouth/teeth, toileting, grooming, dressing, etc.)  - Assess/evaluate cause of self-care deficits   - Assess range of motion  - Assess patient's mobility;  develop plan if impaired  - Assess patient's need for assistive devices and provide as appropriate  - Encourage maximum independence but intervene and supervise when necessary  - Involve family in performance of ADLs  - Assess for home care needs following discharge   - Consider OT consult to assist with ADL evaluation and planning for discharge  - Provide patient education as appropriate  - Monitor functional capacity and physical performance, use of AM PAC & JH-HLM   - Monitor gait, balance and fatigue with ambulation    Outcome: Progressing     Problem: DISCHARGE PLANNING  Goal: Discharge to home or other facility with appropriate resources  Description: INTERVENTIONS:  - Identify barriers to discharge w/patient and caregiver  - Arrange for needed discharge resources and transportation as appropriate  - Identify discharge learning needs (meds, wound care, etc.)  - Arrange for interpretive services to assist at discharge as needed  - Refer to Case Management Department for coordinating discharge planning if the patient needs post-hospital services based on physician/advanced practitioner order or complex needs related to functional status, cognitive ability, or social support system  Outcome: Progressing     Problem: Knowledge Deficit  Goal: Patient/family/caregiver demonstrates understanding of disease process, treatment plan, medications, and discharge instructions  Description: Complete learning assessment and assess knowledge base.  Interventions:  - Provide teaching at level of understanding  - Provide teaching via preferred learning methods  Outcome: Progressing

## 2025-05-24 NOTE — ASSESSMENT & PLAN NOTE
Lab Results   Component Value Date    HGBA1C 9.1 (A) 03/19/2025       Recent Labs     05/23/25  1205 05/23/25  1548 05/23/25  2102 05/24/25  0713   POCGLU 123 89 162* 128       Blood Sugar Average: Last 72 hrs:  (P) [id number]  Home regimen is glargine 65 units at bedtime.  Semaglutide on hold. Decrease home glargine to 50U HS while inpatient and continue with sliding scale every 6 hours while npo today   Watch for hypoglycemia  Placed on gentle hydration with dextrose and saline at 30 cc/h

## 2025-05-24 NOTE — ASSESSMENT & PLAN NOTE
64-year-old female with a history of hypertension, hyperlipidemia, type 2 diabetes mellitus, CKD 3, CVA, and PAD status post bilateral AKA who was sent in here from [facility] due to right AKA stump wound.  Per vascular surgery recommendations appreciated.    Status post washout by vascular team   .  ID on board  Continue Vanco and ertapenem

## 2025-05-24 NOTE — PROGRESS NOTES
Progress Note - Vascular Surgery   Name: Dianna Varghese 64 y.o. female I MRN: [id number]  Unit/Bed#: E5 -01 I Date of Admission: 5/22/2025   Date of Service: 5/24/2025 I Hospital Day: 2    Assessment & Plan  Complication of amputated stump (HCC)  Patient is a 64-year-old female, former smoker, with PMH HTN, HLD, type II DM, hyperparathyroidism, CVA, CKD 3, and PAD s/p bilateral AKA.  Patient presented to [facility] from [facility] due to right AKA stump wound. Vascular surgery is consulted for evaluation of right AKA stump wound.    Imaging:  Right femur xray 5/22/2025:  Status post above-knee amputation without radiographic evidence of osteomyelitis at this time.   CT pelvis 5/22/2025:  There is a thick-walled enhancing tract extending from the anterior medial aspect of the right lower extremity stump wound, into the distal right femoral stump. There is also a lucent tortuous channel extending retrograde within the right femur. This is consistent with interval worsening/progression of the chronic pyogenic osteomyelitis.    Plan:  -Bilateral AKA with nonhealing right AKA stump wound  -CT notes worsening/progression of chronic pyogenic osteomyelitis of right femur with probing wound  - Now POD #1 right AKA stump washout under local anesthesia  -ID recommendations appreciated, will monitor off antibiotics until results of intra-op cultures are available  - Daily dressing changes per nursing  -Continue ASA and atorvastatin  -Strict blood glucose control for optimal wound healing  - Remainder of care per primary team.  -D/W Dr. Silva    24 Hour Events : Right AKA stump washout under local anesthesia yesterday with Dr. Isaac  Subjective :   Patient is resting comfortably in bed.  She is denying any significant right AKA stump pain with dressing change.    Objective :  Temp:  [98 °F (36.7 °C)-98.7 °F (37.1 °C)] 98.3 °F (36.8 °C)  HR:  [64-74] 70  BP: (116-160)/(44-97) 123/49  Resp:  [15-18] 15  SpO2:  [89 %-97 %] 92  %  O2 Device: None (Room air)  Nasal Cannula O2 Flow Rate (L/min):  [2 L/min] 2 L/min     I/O         05/22 0701 05/23 0700 05/23 0701  05/24 0700 05/24 0701 05/25 0700    P.O.   310    Total Intake(mL/kg)   310 (3.2)    Urine (mL/kg/hr) 631      Total Output 631      Net -631  +310           Unmeasured Urine Occurrence  2 x             Physical Exam  Vitals and nursing note reviewed.   Constitutional:       General: She is not in acute distress.     Appearance: She is well-developed.   HENT:      Head: Normocephalic and atraumatic.     Eyes:      Conjunctiva/sclera: Conjunctivae normal.       Cardiovascular:      Rate and Rhythm: Normal rate and regular rhythm.   Pulmonary:      Effort: Pulmonary effort is normal. No respiratory distress.     Musculoskeletal:         General: No swelling or tenderness.      Cervical back: Normal range of motion and neck supple.      Right lower leg: No edema.      Left lower leg: No edema.      Amputation Right Lower Extremity: Right leg is amputated above knee.      Amputation Left Lower Extremity: Left leg is amputated above knee.     Skin:     General: Skin is warm and dry.      Capillary Refill: Capillary refill takes less than 2 seconds.      Findings: Wound present.      Comments: Right AKA surgical site without erythema, warmth, or significant drainage.     Neurological:      General: No focal deficit present.      Mental Status: She is alert and oriented to person, place, and time.     Psychiatric:         Mood and Affect: Mood normal.         Behavior: Behavior normal.           Lab Results: I have reviewed the following results:  CBC with diff:   Lab Results   Component Value Date    WBC 12.16 (H) 05/24/2025    HGB 10.7 (L) 05/24/2025    HCT 33.0 (L) 05/24/2025    MCV 84 05/24/2025     05/24/2025    RBC 3.92 05/24/2025    MCH 27.3 05/24/2025    MCHC 32.4 05/24/2025    RDW 14.6 05/24/2025    MPV 9.1 05/24/2025    NRBC 0 05/23/2025   ,   BMP/CMP:  Lab Results  "  Component Value Date    SODIUM 139 05/24/2025    SODIUM 142 03/06/2023    K 3.8 05/24/2025    K 4.4 03/06/2023     05/24/2025     03/06/2023    CO2 24 05/24/2025    CO2 23 03/06/2023    BUN 27 (H) 05/24/2025    BUN 41 (H) 03/06/2023    CREATININE 1.54 (H) 05/24/2025    CREATININE 1.02 03/06/2023    CALCIUM 8.7 05/24/2025    CALCIUM 8.5 03/06/2023    AST 23 05/23/2025    AST 74 (H) 03/06/2023    ALT 32 05/23/2025     (H) 03/06/2023    ALKPHOS 69 05/23/2025    ALKPHOS 254 (H) 03/06/2023    EGFR 35 05/24/2025    EGFR 63 03/06/2023   ,   Lipid Panel: No results found for: \"CHOL\",   Coags:   Lab Results   Component Value Date    PT 24.1 (H) 03/06/2023    PTT 35 (H) 05/22/2025    INR 1.40 (H) 05/23/2025    INR 2.2 03/06/2023   ,   Blood Culture:   Lab Results   Component Value Date    BLOODCX No Growth at 24 hrs. 05/22/2025   ,   Urinalysis:   Lab Results   Component Value Date    COLORU Yellow 01/17/2023    CLARITYU Cloudy 01/17/2023    SPECGRAV 1.025 01/17/2023    PHUR 6.0 01/17/2023    PHUR 6.5 02/23/2018    LEUKOCYTESUR Small (A) 01/17/2023    NITRITE Negative 01/17/2023    GLUCOSEU Negative 01/17/2023    KETONESU Negative 01/17/2023    BILIRUBINUR Negative 01/17/2023    BLOODU Large (A) 01/17/2023   ,   Urine Culture:   Lab Results   Component Value Date    URINECX >100,000 cfu/ml Enterococcus faecalis (A) 01/17/2023    URINECX <10,000 cfu/ml Citrobacter freundii (A) 01/17/2023    URINECX <10,000 cfu/ml Klebsiella variicola (A) 01/17/2023   ,   Wound Culure:   Lab Results   Component Value Date    WOUNDCULT 2+ Growth of Enterobacter cloacae (A) 05/22/2025     "

## 2025-05-24 NOTE — ASSESSMENT & PLAN NOTE
Stable.  No indication for transfusion at this time    Results from last 7 days   Lab Units 05/24/25  0435 05/23/25  0525 05/22/25  1236   HEMOGLOBIN g/dL 10.7* 10.9* 11.4*   MCV fL 84 84 84   RDW % 14.6 14.3 13.9

## 2025-05-24 NOTE — ASSESSMENT & PLAN NOTE
Patient is a 64-year-old female, former smoker, with PMH HTN, HLD, type II DM, hyperparathyroidism, CVA, CKD 3, and PAD s/p bilateral AKA.  Patient presented to [facility] from [facility] due to right AKA stump wound. Vascular surgery is consulted for evaluation of right AKA stump wound.    Imaging:  Right femur xray 5/22/2025:  Status post above-knee amputation without radiographic evidence of osteomyelitis at this time.   CT pelvis 5/22/2025:  There is a thick-walled enhancing tract extending from the anterior medial aspect of the right lower extremity stump wound, into the distal right femoral stump. There is also a lucent tortuous channel extending retrograde within the right femur. This is consistent with interval worsening/progression of the chronic pyogenic osteomyelitis.    Plan:  -Bilateral AKA with nonhealing right AKA stump wound  -CT notes worsening/progression of chronic pyogenic osteomyelitis of right femur with probing wound  - Now POD #1 right AKA stump washout under local anesthesia  -ID recommendations appreciated, will monitor off antibiotics until results of intra-op cultures are available  - Daily dressing changes per nursing  -Continue ASA and atorvastatin  -Strict blood glucose control for optimal wound healing  - Remainder of care per primary team.  -D/W Dr. Silva

## 2025-05-25 LAB
BACTERIA SPEC ANAEROBE CULT: NORMAL
GLUCOSE SERPL-MCNC: 138 MG/DL (ref 65–140)
GLUCOSE SERPL-MCNC: 175 MG/DL (ref 65–140)
GLUCOSE SERPL-MCNC: 189 MG/DL (ref 65–140)
GLUCOSE SERPL-MCNC: 244 MG/DL (ref 65–140)
VANCOMYCIN SERPL-MCNC: 18 UG/ML (ref 10–20)

## 2025-05-25 PROCEDURE — 82948 REAGENT STRIP/BLOOD GLUCOSE: CPT

## 2025-05-25 PROCEDURE — 99232 SBSQ HOSP IP/OBS MODERATE 35: CPT | Performed by: INTERNAL MEDICINE

## 2025-05-25 PROCEDURE — G0545 PR INHERENT VISIT TO INPT: HCPCS | Performed by: INTERNAL MEDICINE

## 2025-05-25 PROCEDURE — 80202 ASSAY OF VANCOMYCIN: CPT | Performed by: INTERNAL MEDICINE

## 2025-05-25 RX ORDER — SODIUM CHLORIDE, SODIUM LACTATE, POTASSIUM CHLORIDE, CALCIUM CHLORIDE 600; 310; 30; 20 MG/100ML; MG/100ML; MG/100ML; MG/100ML
125 INJECTION, SOLUTION INTRAVENOUS CONTINUOUS
OUTPATIENT
Start: 2025-05-25

## 2025-05-25 RX ORDER — SODIUM CHLORIDE, SODIUM LACTATE, POTASSIUM CHLORIDE, CALCIUM CHLORIDE 600; 310; 30; 20 MG/100ML; MG/100ML; MG/100ML; MG/100ML
20 INJECTION, SOLUTION INTRAVENOUS CONTINUOUS
OUTPATIENT
Start: 2025-05-25

## 2025-05-25 RX ORDER — FENTANYL CITRATE/PF 50 MCG/ML
25 SYRINGE (ML) INJECTION
Refills: 0 | OUTPATIENT
Start: 2025-05-25

## 2025-05-25 RX ORDER — ONDANSETRON 2 MG/ML
4 INJECTION INTRAMUSCULAR; INTRAVENOUS ONCE AS NEEDED
OUTPATIENT
Start: 2025-05-25

## 2025-05-25 RX ORDER — OXYMETAZOLINE HYDROCHLORIDE 0.05 G/100ML
1 SPRAY NASAL ONCE
Status: COMPLETED | OUTPATIENT
Start: 2025-05-25 | End: 2025-05-25

## 2025-05-25 RX ORDER — VANCOMYCIN HYDROCHLORIDE 750 MG/150ML
7.5 INJECTION, SOLUTION INTRAVENOUS ONCE
Status: COMPLETED | OUTPATIENT
Start: 2025-05-25 | End: 2025-05-26

## 2025-05-25 RX ORDER — LORATADINE 10 MG/1
10 TABLET ORAL
Status: DISCONTINUED | OUTPATIENT
Start: 2025-05-25 | End: 2025-05-28 | Stop reason: HOSPADM

## 2025-05-25 RX ADMIN — METOPROLOL TARTRATE 50 MG: 50 TABLET, FILM COATED ORAL at 21:52

## 2025-05-25 RX ADMIN — GABAPENTIN 200 MG: 100 CAPSULE ORAL at 09:24

## 2025-05-25 RX ADMIN — METOPROLOL TARTRATE 50 MG: 50 TABLET, FILM COATED ORAL at 09:24

## 2025-05-25 RX ADMIN — APIXABAN 5 MG: 5 TABLET, FILM COATED ORAL at 09:24

## 2025-05-25 RX ADMIN — NYSTATIN: 100000 POWDER TOPICAL at 17:07

## 2025-05-25 RX ADMIN — LISINOPRIL 40 MG: 20 TABLET ORAL at 09:24

## 2025-05-25 RX ADMIN — FERROUS SULFATE TAB 325 MG (65 MG ELEMENTAL FE) 325 MG: 325 (65 FE) TAB at 11:40

## 2025-05-25 RX ADMIN — INSULIN LISPRO 1 UNITS: 100 INJECTION, SOLUTION INTRAVENOUS; SUBCUTANEOUS at 17:06

## 2025-05-25 RX ADMIN — SERTRALINE HYDROCHLORIDE 100 MG: 100 TABLET ORAL at 09:24

## 2025-05-25 RX ADMIN — HYDRALAZINE HYDROCHLORIDE 100 MG: 50 TABLET ORAL at 14:50

## 2025-05-25 RX ADMIN — HYDRALAZINE HYDROCHLORIDE 100 MG: 50 TABLET ORAL at 22:01

## 2025-05-25 RX ADMIN — OXYMETAZOLINE HYDROCHLORIDE 1 SPRAY: 0.05 SPRAY NASAL at 23:42

## 2025-05-25 RX ADMIN — AMIODARONE HYDROCHLORIDE 100 MG: 100 TABLET ORAL at 09:24

## 2025-05-25 RX ADMIN — LATANOPROST 1 DROP: 50 SOLUTION OPHTHALMIC at 22:01

## 2025-05-25 RX ADMIN — INSULIN LISPRO 1 UNITS: 100 INJECTION, SOLUTION INTRAVENOUS; SUBCUTANEOUS at 07:53

## 2025-05-25 RX ADMIN — Medication 250 MG: at 09:24

## 2025-05-25 RX ADMIN — Medication 250 MG: at 21:52

## 2025-05-25 RX ADMIN — APIXABAN 5 MG: 5 TABLET, FILM COATED ORAL at 21:52

## 2025-05-25 RX ADMIN — AMLODIPINE BESYLATE 10 MG: 10 TABLET ORAL at 09:24

## 2025-05-25 RX ADMIN — GABAPENTIN 200 MG: 100 CAPSULE ORAL at 21:52

## 2025-05-25 RX ADMIN — INSULIN LISPRO 3 UNITS: 100 INJECTION, SOLUTION INTRAVENOUS; SUBCUTANEOUS at 11:40

## 2025-05-25 RX ADMIN — ASPIRIN 81 MG CHEWABLE TABLET 81 MG: 81 TABLET CHEWABLE at 09:24

## 2025-05-25 RX ADMIN — ERTAPENEM SODIUM 1000 MG: 1 INJECTION INTRAMUSCULAR; INTRAVENOUS at 17:06

## 2025-05-25 RX ADMIN — VANCOMYCIN HYDROCHLORIDE 750 MG: 750 INJECTION, SOLUTION INTRAVENOUS at 14:50

## 2025-05-25 RX ADMIN — HYDROCHLOROTHIAZIDE 25 MG: 25 TABLET ORAL at 09:24

## 2025-05-25 RX ADMIN — LEVOTHYROXINE SODIUM 62.5 MCG: 0.12 TABLET ORAL at 06:03

## 2025-05-25 RX ADMIN — HYDRALAZINE HYDROCHLORIDE 100 MG: 50 TABLET ORAL at 06:03

## 2025-05-25 RX ADMIN — INSULIN GLARGINE 50 UNITS: 100 INJECTION, SOLUTION SUBCUTANEOUS at 09:31

## 2025-05-25 RX ADMIN — ATORVASTATIN CALCIUM 40 MG: 40 TABLET, FILM COATED ORAL at 09:24

## 2025-05-25 RX ADMIN — NYSTATIN: 100000 POWDER TOPICAL at 09:24

## 2025-05-25 NOTE — ASSESSMENT & PLAN NOTE
Lab Results   Component Value Date    HGBA1C 9.1 (A) 03/19/2025       Recent Labs     05/24/25  1119 05/24/25  1548 05/24/25  2048 05/25/25  0712   POCGLU 178* 151* 151* 189*       Blood Sugar Average: Last 72 hrs:  (P) 144.4270783302388634  Home regimen is glargine 65 units at bedtime.  Semaglutide on hold. Decrease home glargine to 50U HS while inpatient and continue with sliding scale   Monitor blood glucose

## 2025-05-25 NOTE — PROGRESS NOTES
Dianna Varghese is a 64 y.o. female who is currently ordered Vancomycin IV with management by the Pharmacy Consult service.  Relevant clinical data and objective / subjective history reviewed.  Vancomycin Assessment:  Indication and Goal AUC/Trough: Bone/joint infection (goal -600, trough >10)  Clinical Status: stable  Micro:     Renal Function:  SCr: 1.54 mg/dL  CrCl: 41.8 mL/min  Renal replacement: Not on dialysis  Days of Therapy: 3  Current Dose: Pulse dosing  Vancomycin Plan:  New Dosing: continue pulse dosing, given 750mg IV once to start at 1430  Next Level: 5/26 with AM labs   Renal Function Monitoring: Daily BMP and UOP  Pharmacy will continue to follow closely for s/sx of nephrotoxicity, infusion reactions and appropriateness of therapy.  BMP and CBC will be ordered per protocol. We will continue to follow the patient’s culture results and clinical progress daily.    Amanda Beltran, Pharmacist

## 2025-05-25 NOTE — PROGRESS NOTES
Progress Note - Infectious Disease   Name: Dianna Varghese 64 y.o. female I MRN: [id number]  Unit/Bed#: E5 -01 I Date of Admission: 5/22/2025   Date of Service: 5/25/2025 I Hospital Day: 3    Assessment & Plan  Complication of amputated stump (HCC)  Patient presenting with draining sinus tract from right AKA site.  CT showing a thick-walled enhancing tract extending from anterior medial aspect of right lower extremity stump wound into distal right femoral stump. On exam there is a sinus tract with mild purulent drainage noted but no current radiographic evidence of abscess and patient has no cellulitis noted on exam.  She had a mild leukocytosis, though has down-trended and she is afebrile.  Patient is status post I&D of AKA stump on 5/23.  At surgery, there was no bone encountered.  Therefore, osteomyelitis is unlikely.  Wound culture from 5/22 with 2+ GNR's.  On review of prior cultures patient has grown and MDR Enterobacter (1/17/23, 1/27/24), Enterococcus (1/2023), MSSA (1/24/23), and Candida albicans.  Patient remains clinically and systemically, without sepsis or systemic toxicity.  OR culture is growing Enterobacter, with no GPC growth thus far.  - Continue IV ertapenem for now  -Follow-up on susceptibilities of Enterobacter isolate  -Continue IV vancomycin for now.  If there is no GPC growth, will likely discontinue it in 24 hours  - Wound care per vascular surgery  -Treat x 7-day post I&D, through 5/30  S/P bilateral above knee amputation (HCC)  In setting of PAD.  DM (diabetes mellitus) type II, controlled, with peripheral vascular disorder (HCC)  Lab Results   Component Value Date    HGBA1C 9.1 (A) 03/19/2025   Significant risk factor for poor wound healing and infection.  -Recommend tight glycemic control per primary team  -Patient will need close follow-up with PCP and/endocrinology for better diabetic control long-term    Discussed with patient in detail regarding the above  plan.      Antibiotics:  Vancomycin/ertapenem  POD # 2    Subjective   Patient is comfortable.  Pain in AKA stump is improving.  Temperature stays down.  No chills.  She is tolerating antibiotics well.  No nausea, vomiting or diarrhea.    Objective :  Temp:  [97.6 °F (36.4 °C)-98.5 °F (36.9 °C)] 98.5 °F (36.9 °C)  HR:  [64-71] 64  BP: (107-149)/(47-57) 123/50  Resp:  [15] 15  SpO2:  [91 %-97 %] 97 %  O2 Device: None (Room air)    Physical Exam:     General: Awake, alert, cooperative, no distress.   Neck:  Supple. No mass.  No lymphadenopathy.   Lungs: Expansion symmetric, no rales, no wheezing, respirations unlabored.   Heart:  Regular rate and rhythm, S1 and S2 normal, no murmur.   Abdomen: Soft, nondistended, non-tender, bowel sounds active all four quadrants, no masses, no organomegaly.   Extremities: Stable leg edema.  AKA stump with dressing in place.  Dressing is dry.  No erythema/warmth beyond dressing.  Stable mild tenderness.   Skin:  No rash.   Neuro: Moves all extremities.        Lab Results: I have reviewed the following results:  Results from last 7 days   Lab Units 05/24/25  0435 05/23/25  0525 05/22/25  1236   WBC Thousand/uL 12.16* 12.55* 13.18*   HEMOGLOBIN g/dL 10.7* 10.9* 11.4*   PLATELETS Thousands/uL 288 272 296     Results from last 7 days   Lab Units 05/24/25  0435 05/23/25  0525 05/22/25  1236   SODIUM mmol/L 139 139 136   POTASSIUM mmol/L 3.8 4.0 4.9   CHLORIDE mmol/L 106 106 103   CO2 mmol/L 24 24 25   BUN mg/dL 27* 28* 24   CREATININE mg/dL 1.54* 1.54* 1.18   EGFR ml/min/1.73sq m 35 35 49   CALCIUM mg/dL 8.7 8.9 9.0   AST U/L  --  23 30   ALT U/L  --  32 36   ALK PHOS U/L  --  69 74   ALBUMIN g/dL  --  3.5 3.5     Results from last 7 days   Lab Units 05/23/25  1510 05/22/25  1641 05/22/25  1346 05/22/25  1236   BLOOD CULTURE   --   --  No Growth at 48 hrs. No Growth at 48 hrs.   GRAM STAIN RESULT  1+ Disintegrating polys  No bacteria seen  --   --  1+ Polys  No bacteria seen   WOUND  CULTURE  1+ Growth of Enterobacter cloacae*  --   --  2+ Growth of Enterobacter cloacae*   MRSA CULTURE ONLY   --  No Methicillin Resistant Staphlyococcus aureus (MRSA) isolated  --   --

## 2025-05-25 NOTE — PLAN OF CARE
Problem: Prexisting or High Potential for Compromised Skin Integrity  Goal: Skin integrity is maintained or improved  Description: INTERVENTIONS:  - Identify patients at risk for skin breakdown  - Assess and monitor skin integrity including under and around medical devices   - Assess and monitor nutrition and hydration status  - Monitor labs  - Assess for incontinence   - Turn and reposition patient  - Assist with mobility/ambulation  - Relieve pressure over lesly prominences   - Avoid friction and shearing  - Provide appropriate hygiene as needed including keeping skin clean and dry  - Evaluate need for skin moisturizer/barrier cream  - Collaborate with interdisciplinary team  - Patient/family teaching  - Consider wound care consult    Assess:  - Review Nican[place] scale daily  - Inspect skin when repositioning, toileting, and assisting with ADLS  Outcome: Progressing     Problem: PAIN - ADULT  Goal: Verbalizes/displays adequate comfort level or baseline comfort level  Description: Interventions:  - Encourage patient to monitor pain and request assistance  - Assess pain using appropriate pain scale  - Administer analgesics as ordered based on type and severity of pain and evaluate response  - Implement non-pharmacological measures as appropriate and evaluate response  - Consider cultural and social influences on pain and pain management  - Notify physician/advanced practitioner if interventions unsuccessful or patient reports new pain  - Educate patient/family on pain management process including their role and importance of  reporting pain   - Provide non-pharmacologic/complimentary pain relief interventions  Outcome: Progressing     Problem: INFECTION - ADULT  Goal: Absence or prevention of progression during hospitalization  Description: INTERVENTIONS:  - Assess and monitor for signs and symptoms of infection  - Monitor lab/diagnostic results  - Monitor all insertion sites, i.e. indwelling lines, tubes, and  drains  - Monitor endotracheal if appropriate and nasal secretions for changes in amount and color  - [place] appropriate cooling/warming therapies per order  - Administer medications as ordered  - Instruct and encourage patient and family to use good hand hygiene technique  - Identify and instruct in appropriate isolation precautions for identified infection/condition  Outcome: Progressing     Problem: DISCHARGE PLANNING  Goal: Discharge to home or other facility with appropriate resources  Description: INTERVENTIONS:  - Identify barriers to discharge w/patient and caregiver  - Arrange for needed discharge resources and transportation as appropriate  - Identify discharge learning needs (meds, wound care, etc.)  - Arrange for interpretive services to assist at discharge as needed  - Refer to Case Management Department for coordinating discharge planning if the patient needs post-hospital services based on physician/advanced practitioner order or complex needs related to functional status, cognitive ability, or social support system  Outcome: Progressing

## 2025-05-25 NOTE — PROGRESS NOTES
Progress Note - Hospitalist   Name: Dianna Varghese 64 y.o. female I MRN: [id number]  Unit/Bed#: E5 -01 I Date of Admission: 5/22/2025   Date of Service: 5/25/2025 I Hospital Day: 3    Assessment & Plan  Complication of amputated stump (HCC)  64-year-old female with a history of hypertension, hyperlipidemia, type 2 diabetes mellitus, CKD 3, CVA, and PAD status post bilateral AKA who was sent in here from [facility] due to right AKA stump wound.  Per vascular surgery recommendations appreciated.    Status post washout by vascular team   .  ID on board  Continue Vanco and ertapenem   follow-up culture sensitivities and speciation  Possible plan for repeat washout  Follow-up further vascular recommendations      PAD (peripheral artery disease) (HCC)  Status post left AKA 11/2022  Status post right AKA stump revision 1/2023  Continue aspirin, statin, hold eliquis for surgery (On Eliquis for PAD / stroke. Of note, patient had a prior history of HIT)  Continue Eliquis  Esophageal reflux  Continue as needed calcium carbonate.  Pantoprazole currently not in her medication list it appears that this has been recently discontinued as per documentation.  DM (diabetes mellitus) type II, controlled, with peripheral vascular disorder (HCC)  Lab Results   Component Value Date    HGBA1C 9.1 (A) 03/19/2025       Recent Labs     05/24/25  1119 05/24/25  1548 05/24/25  2048 05/25/25  0712   POCGLU 178* 151* 151* 189*       Blood Sugar Average: Last 72 hrs:  (P) [id number]  Home regimen is glargine 65 units at bedtime.  Semaglutide on hold. Decrease home glargine to 50U HS while inpatient and continue with sliding scale   Monitor blood glucose  Anxiety and depression  Continue sertraline and as needed Xanax  Hypertension  Bp controlled  Resume hydrochlorothiazide, amlodipine, hydralazine, metoprolol, lisinopril  Anemia  Stable.  No indication for transfusion at this time    Results from last 7 days   Lab Units  05/24/25  0435 05/23/25  0525 05/22/25  1236   HEMOGLOBIN g/dL 10.7* 10.9* 11.4*   MCV fL 84 84 84   RDW % 14.6 14.3 13.9       S/P bilateral above knee amputation (HCC)  Bilateral AKA, continue supportive care  Acquired hypothyroidism  Continue levothyroxine    VTE Pharmacologic Prophylaxis:   Moderate Risk (Score 3-4) - Pharmacological DVT Prophylaxis Ordered: apixaban (Eliquis).    Mobility:   Basic Mobility Inpatient Raw Score: 10  -[facility] Goal: 4: Move to chair/commode  JH-HLM Achieved: 2: Bed activities/Dependent transfer  JH-HLM Goal achieved. Continue to encourage appropriate mobility.    Patient Centered Rounds: I performed bedside rounds with nursing staff today.   Discussions with Specialists or Other Care Team Provider: cm, nursing    Education and Discussions with Family / Patient: Patient declined call to .     Current Length of Stay: 3 day(s)  Current Patient Status: Inpatient   Certification Statement: The patient will continue to require additional inpatient hospital stay due to see below  Discharge Plan: Still requiring IV antibiotics.  Expect 24 to 48 hours    Code Status: Level 1 - Full Code    Subjective   Currently without any acute complaints.  Denies fevers, chills, shortness of breath, chest pain, abdominal pain or any other complaints    Objective :  Temp:  [97.6 °F (36.4 °C)-98.3 °F (36.8 °C)] 98.1 °F (36.7 °C)  HR:  [65-72] 68  BP: (107-149)/(47-57) 107/56  Resp:  [15-18] 15  SpO2:  [91 %-95 %] 91 %  O2 Device: None (Room air)    Body mass index is 38.39 kg/m².     Input and Output Summary (last 24 hours):     Intake/Output Summary (Last 24 hours) at 5/25/2025 0912  Last data filed at 5/25/2025 0825  Gross per 24 hour   Intake 1210 ml   Output --   Net 1210 ml       Physical Exam  Constitutional:       General: She is not in acute distress.     Appearance: She is well-developed. She is not diaphoretic.   HENT:      Head: Normocephalic and atraumatic.      Nose: Nose normal.       Mouth/Throat:      Pharynx: No oropharyngeal exudate.     Eyes:      General: No scleral icterus.        Right eye: No discharge.         Left eye: No discharge.      Conjunctiva/sclera: Conjunctivae normal.     Neck:      Thyroid: No thyromegaly.      Vascular: No JVD.     Cardiovascular:      Rate and Rhythm: Normal rate and regular rhythm.      Heart sounds: Normal heart sounds. No murmur heard.     No friction rub. No gallop.   Pulmonary:      Effort: Pulmonary effort is normal. No respiratory distress.      Breath sounds: Normal breath sounds. No wheezing or rales.   Chest:      Chest wall: No tenderness.   Abdominal:      General: Bowel sounds are normal. There is no distension.      Palpations: Abdomen is soft.      Tenderness: There is no abdominal tenderness. There is no guarding or rebound.     Musculoskeletal:         General: No tenderness or deformity. Normal range of motion.      Cervical back: Normal range of motion and neck supple.     Skin:     General: Skin is warm and dry.      Findings: No erythema or rash.     Neurological:      Mental Status: She is alert. Mental status is at baseline.      Cranial Nerves: No cranial nerve deficit.      Sensory: No sensory deficit.      Motor: No abnormal muscle tone.      Coordination: Coordination normal.           Lines/Drains:              Lab Results: I have reviewed the following results:   Results from last 7 days   Lab Units 05/24/25  0435 05/23/25  0525   WBC Thousand/uL 12.16* 12.55*   HEMOGLOBIN g/dL 10.7* 10.9*   HEMATOCRIT % 33.0* 34.0*   PLATELETS Thousands/uL 288 272   SEGS PCT %  --  75   LYMPHO PCT %  --  15   MONO PCT %  --  6   EOS PCT %  --  3     Results from last 7 days   Lab Units 05/24/25  0435 05/23/25  0525   SODIUM mmol/L 139 139   POTASSIUM mmol/L 3.8 4.0   CHLORIDE mmol/L 106 106   CO2 mmol/L 24 24   BUN mg/dL 27* 28*   CREATININE mg/dL 1.54* 1.54*   ANION GAP mmol/L 9 9   CALCIUM mg/dL 8.7 8.9   ALBUMIN g/dL  --  3.5   TOTAL  BILIRUBIN mg/dL  --  0.21   ALK PHOS U/L  --  69   ALT U/L  --  32   AST U/L  --  23   GLUCOSE RANDOM mg/dL 90 122     Results from last 7 days   Lab Units 05/23/25  1251   INR  1.40*     Results from last 7 days   Lab Units 05/25/25  0712 05/24/25  2048 05/24/25  1548 05/24/25  1119 05/24/25  0713 05/23/25  2102 05/23/25  1548 05/23/25  1205 05/23/25  0724 05/22/25  2103 05/22/25  1633   POC GLUCOSE mg/dl 189* 151* 151* 178* 128 162* 89 123 133 196* 91         Results from last 7 days   Lab Units 05/22/25  1346   LACTIC ACID mmol/L 0.9       Recent Cultures (last 7 days):   Results from last 7 days   Lab Units 05/23/25  1510 05/22/25  1346 05/22/25  1236   BLOOD CULTURE   --  No Growth at 48 hrs. No Growth at 48 hrs.   GRAM STAIN RESULT  1+ Disintegrating polys  No bacteria seen  --  1+ Polys  No bacteria seen   WOUND CULTURE  1+ Growth of Enterobacter cloacae*  --  2+ Growth of Enterobacter cloacae*       Imaging Results Review: I personally reviewed the following image studies/reports in PACS and discussed pertinent findings with Radiology: chest xray. My interpretation of the radiology images/reports is:  .  Other Study Results Review: EKG was reviewed.     Last 24 Hours Medication List:     Current Facility-Administered Medications:     acetaminophen (TYLENOL) tablet 650 mg, Q4H PRN    ALPRAZolam (XANAX) tablet 0.25 mg, Daily PRN    amiodarone tablet 100 mg, Daily    amLODIPine (NORVASC) tablet 10 mg, Daily    apixaban (ELIQUIS) tablet 5 mg, BID    aspirin chewable tablet 81 mg, Daily    atorvastatin (LIPITOR) tablet 40 mg, Daily    calcium carbonate (TUMS) chewable tablet 1,000 mg, Q6H PRN    ertapenem (INVanz) 1,000 mg in sodium chloride 0.9 % 50 mL IVPB, Q24H, Last Rate: 1,000 mg (05/24/25 1636)    ferrous sulfate tablet 325 mg, Daily With Lunch    gabapentin (NEURONTIN) capsule 200 mg, BID    hydrALAZINE (APRESOLINE) tablet 100 mg, Q8H MICHELLE    hydroCHLOROthiazide tablet 25 mg, Daily    insulin glargine  (LANTUS) subcutaneous injection 50 Units 0.5 mL, QAM    insulin lispro (HumALOG/ADMELOG) 100 units/mL subcutaneous injection 1-6 Units, 4x Daily (AC & HS) **AND** Fingerstick Glucose (POCT), 4x Daily AC and at bedtime    latanoprost (XALATAN) 0.005 % ophthalmic solution 1 drop, HS    levothyroxine tablet 62.5 mcg, Early Morning    lisinopril (ZESTRIL) tablet 40 mg, Daily    metoprolol tartrate (LOPRESSOR) tablet 50 mg, Q12H MICHELLE    nystatin (MYCOSTATIN) powder, BID    oxymetazoline (AFRIN) 0.05 % 2 spray, patient supplied medication, Q12H PRN    polyethylene glycol (MIRALAX) packet 17 g, Daily PRN    saccharomyces boulardii (FLORASTOR) capsule 250 mg, BID    sertraline (ZOLOFT) tablet 100 mg, Daily    vancomycin (VANCOCIN) IVPB (premix in dextrose) 1,000 mg 200 mL, Daily PRN    vancomycin (VANCOCIN) IVPB (premix in dextrose) 750 mg 150 mL, Once    Administrative Statements   Today, Patient Was Seen By: Varun Awad MD  I have spent a total time of 30 minutes in caring for this patient on the day of the visit/encounter including Diagnostic results.    **Please Note: This note may have been constructed using a voice recognition system.**

## 2025-05-25 NOTE — PLAN OF CARE
Problem: Potential for Falls  Goal: Patient will remain free of falls  Description: INTERVENTIONS:  - Educate patient/family on patient safety including physical limitations  - Instruct patient to call for assistance with activity   - Consider consulting OT/PT to assist with strengthening/mobility based on AM PAC & JH-HLM score  - Consult OT/PT to assist with strengthening/mobility   - Keep Call bell within reach  - Keep bed low and locked with side rails adjusted as appropriate  - Keep care items and personal belongings within reach  - Initiate and maintain comfort rounds  - Make Fall Risk Sign visible to staff  - Offer Toileting every 2 Hours, in advance of need  - Initiate/Maintain BED alarm  - Apply yellow socks and bracelet for high fall risk patients  - Consider moving patient to room near nurses station  Outcome: Progressing     Problem: Prexisting or High Potential for Compromised Skin Integrity  Goal: Skin integrity is maintained or improved  Description: INTERVENTIONS:  - Identify patients at risk for skin breakdown  - Assess and monitor skin integrity including under and around medical devices   - Assess and monitor nutrition and hydration status  - Monitor labs  - Assess for incontinence   - Turn and reposition patient  - Assist with mobility/ambulation  - Relieve pressure over lesly prominences   - Avoid friction and shearing  - Provide appropriate hygiene as needed including keeping skin clean and dry  - Evaluate need for skin moisturizer/barrier cream  - Collaborate with interdisciplinary team  - Patient/family teaching  - Consider wound care consult    Assess:  - Review [place] scale daily  - Inspect skin when repositioning, toileting, and assisting with ADLS  Outcome: Progressing     Problem: PAIN - ADULT  Goal: Verbalizes/displays adequate comfort level or baseline comfort level  Description: Interventions:  - Encourage patient to monitor pain and request assistance  - Assess pain using  appropriate pain scale  - Administer analgesics as ordered based on type and severity of pain and evaluate response  - Implement non-pharmacological measures as appropriate and evaluate response  - Consider cultural and social influences on pain and pain management  - Notify physician/advanced practitioner if interventions unsuccessful or patient reports new pain  - Educate patient/family on pain management process including their role and importance of  reporting pain   - Provide non-pharmacologic/complimentary pain relief interventions  Outcome: Progressing     Problem: INFECTION - ADULT  Goal: Absence or prevention of progression during hospitalization  Description: INTERVENTIONS:  - Assess and monitor for signs and symptoms of infection  - Monitor lab/diagnostic results  - Monitor all insertion sites, i.e. indwelling lines, tubes, and drains  - Monitor endotracheal if appropriate and nasal secretions for changes in amount and color  - [place] appropriate cooling/warming therapies per order  - Administer medications as ordered  - Instruct and encourage patient and family to use good hand hygiene technique  - Identify and instruct in appropriate isolation precautions for identified infection/condition  Outcome: Progressing  Goal: Absence of fever/infection during neutropenic period  Description: INTERVENTIONS:  - Monitor WBC  - Perform strict hand hygiene  - Limit to healthy visitors only  - No plants, dried, fresh or silk flowers with hopkins in patient room  Outcome: Progressing     Problem: SAFETY ADULT  Goal: Maintain or return to baseline ADL function  Description: INTERVENTIONS:  -  Assess patient's ability to carry out ADLs; assess patient's baseline for ADL function and identify physical deficits which impact ability to perform ADLs (bathing, care of mouth/teeth, toileting, grooming, dressing, etc.)  - Assess/evaluate cause of self-care deficits   - Assess range of motion  - Assess patient's mobility;  develop plan if impaired  - Assess patient's need for assistive devices and provide as appropriate  - Encourage maximum independence but intervene and supervise when necessary  - Involve family in performance of ADLs  - Assess for home care needs following discharge   - Consider OT consult to assist with ADL evaluation and planning for discharge  - Provide patient education as appropriate  - Monitor functional capacity and physical performance, use of AM PAC & JH-HLM   - Monitor gait, balance and fatigue with ambulation    Outcome: Progressing     Problem: DISCHARGE PLANNING  Goal: Discharge to home or other facility with appropriate resources  Description: INTERVENTIONS:  - Identify barriers to discharge w/patient and caregiver  - Arrange for needed discharge resources and transportation as appropriate  - Identify discharge learning needs (meds, wound care, etc.)  - Arrange for interpretive services to assist at discharge as needed  - Refer to Case Management Department for coordinating discharge planning if the patient needs post-hospital services based on physician/advanced practitioner order or complex needs related to functional status, cognitive ability, or social support system  Outcome: Progressing     Problem: Knowledge Deficit  Goal: Patient/family/caregiver demonstrates understanding of disease process, treatment plan, medications, and discharge instructions  Description: Complete learning assessment and assess knowledge base.  Interventions:  - Provide teaching at level of understanding  - Provide teaching via preferred learning methods  Outcome: Progressing

## 2025-05-25 NOTE — ASSESSMENT & PLAN NOTE
64-year-old female with a history of hypertension, hyperlipidemia, type 2 diabetes mellitus, CKD 3, CVA, and PAD status post bilateral AKA who was sent in here from [facility] due to right AKA stump wound.  Per vascular surgery recommendations appreciated.    Status post washout by vascular team   .  ID on board  Continue Vanco and ertapenem   follow-up culture sensitivities and speciation  Possible plan for repeat washout  Follow-up further vascular recommendations

## 2025-05-25 NOTE — ASSESSMENT & PLAN NOTE
Patient presenting with draining sinus tract from right AKA site.  CT showing a thick-walled enhancing tract extending from anterior medial aspect of right lower extremity stump wound into distal right femoral stump. On exam there is a sinus tract with mild purulent drainage noted but no current radiographic evidence of abscess and patient has no cellulitis noted on exam.  She had a mild leukocytosis, though has down-trended and she is afebrile.  Patient is status post I&D of AKA stump on 5/23.  At surgery, there was no bone encountered.  Therefore, osteomyelitis is unlikely.  Wound culture from 5/22 with 2+ GNR's.  On review of prior cultures patient has grown and MDR Enterobacter (1/17/23, 1/27/24), Enterococcus (1/2023), MSSA (1/24/23), and Candida albicans.  Patient remains clinically and systemically, without sepsis or systemic toxicity.  OR culture is growing Enterobacter, with no GPC growth thus far.  - Continue IV ertapenem for now  -Follow-up on susceptibilities of Enterobacter isolate  -Continue IV vancomycin for now.  If there is no GPC growth, will likely discontinue it in 24 hours  - Wound care per vascular surgery  -Treat x 7-day post I&D, through 5/30

## 2025-05-25 NOTE — ASSESSMENT & PLAN NOTE
Status post left AKA 11/2022  Status post right AKA stump revision 1/2023  Continue aspirin, statin, hold eliquis for surgery (On Eliquis for PAD / stroke. Of note, patient had a prior history of HIT)  Continue Eliquis

## 2025-05-26 LAB
ANION GAP SERPL CALCULATED.3IONS-SCNC: 7 MMOL/L (ref 4–13)
BACTERIA WND AEROBE CULT: ABNORMAL
BASOPHILS # BLD AUTO: 0.07 THOUSANDS/ÂΜL (ref 0–0.1)
BASOPHILS NFR BLD AUTO: 1 % (ref 0–1)
BLAIMP ISLT/SPM QL: NOT DETECTED
BLAKPC ISLT/SPM QL: NOT DETECTED
BLAOXA-48 ISLT/SPM QL: NOT DETECTED
BLAVIM ISLT/SPM QL: NOT DETECTED
BUN SERPL-MCNC: 27 MG/DL (ref 5–25)
CALCIUM SERPL-MCNC: 9.1 MG/DL (ref 8.4–10.2)
CHLORIDE SERPL-SCNC: 105 MMOL/L (ref 96–108)
CO2 SERPL-SCNC: 26 MMOL/L (ref 21–32)
CREAT SERPL-MCNC: 1.5 MG/DL (ref 0.6–1.3)
EOSINOPHIL # BLD AUTO: 0.45 THOUSAND/ÂΜL (ref 0–0.61)
EOSINOPHIL NFR BLD AUTO: 4 % (ref 0–6)
ERYTHROCYTE [DISTWIDTH] IN BLOOD BY AUTOMATED COUNT: 14.5 % (ref 11.6–15.1)
GFR SERPL CREATININE-BSD FRML MDRD: 36 ML/MIN/1.73SQ M
GLUCOSE SERPL-MCNC: 134 MG/DL (ref 65–140)
GLUCOSE SERPL-MCNC: 158 MG/DL (ref 65–140)
GLUCOSE SERPL-MCNC: 180 MG/DL (ref 65–140)
GLUCOSE SERPL-MCNC: 180 MG/DL (ref 65–140)
GLUCOSE SERPL-MCNC: 181 MG/DL (ref 65–140)
GRAM STN SPEC: ABNORMAL
GRAM STN SPEC: ABNORMAL
HCT VFR BLD AUTO: 34 % (ref 34.8–46.1)
HGB BLD-MCNC: 10.5 G/DL (ref 11.5–15.4)
IMM GRANULOCYTES # BLD AUTO: 0.1 THOUSAND/UL (ref 0–0.2)
IMM GRANULOCYTES NFR BLD AUTO: 1 % (ref 0–2)
LYMPHOCYTES # BLD AUTO: 2.32 THOUSANDS/ÂΜL (ref 0.6–4.47)
LYMPHOCYTES NFR BLD AUTO: 18 % (ref 14–44)
MCH RBC QN AUTO: 26.8 PG (ref 26.8–34.3)
MCHC RBC AUTO-ENTMCNC: 30.9 G/DL (ref 31.4–37.4)
MCV RBC AUTO: 87 FL (ref 82–98)
MONOCYTES # BLD AUTO: 0.97 THOUSAND/ÂΜL (ref 0.17–1.22)
MONOCYTES NFR BLD AUTO: 8 % (ref 4–12)
NDM CEPHEID: NOT DETECTED
NEUTROPHILS # BLD AUTO: 9.04 THOUSANDS/ÂΜL (ref 1.85–7.62)
NEUTS SEG NFR BLD AUTO: 68 % (ref 43–75)
NRBC BLD AUTO-RTO: 0 /100 WBCS
PLATELET # BLD AUTO: 303 THOUSANDS/UL (ref 149–390)
PMV BLD AUTO: 9.7 FL (ref 8.9–12.7)
POTASSIUM SERPL-SCNC: 4.3 MMOL/L (ref 3.5–5.3)
RBC # BLD AUTO: 3.92 MILLION/UL (ref 3.81–5.12)
SODIUM SERPL-SCNC: 138 MMOL/L (ref 135–147)
VANCOMYCIN SERPL-MCNC: 18 UG/ML (ref 10–20)
WBC # BLD AUTO: 12.95 THOUSAND/UL (ref 4.31–10.16)

## 2025-05-26 PROCEDURE — G0545 PR INHERENT VISIT TO INPT: HCPCS | Performed by: INTERNAL MEDICINE

## 2025-05-26 PROCEDURE — 99232 SBSQ HOSP IP/OBS MODERATE 35: CPT | Performed by: INTERNAL MEDICINE

## 2025-05-26 PROCEDURE — 85025 COMPLETE CBC W/AUTO DIFF WBC: CPT | Performed by: INTERNAL MEDICINE

## 2025-05-26 PROCEDURE — 82948 REAGENT STRIP/BLOOD GLUCOSE: CPT

## 2025-05-26 PROCEDURE — 80202 ASSAY OF VANCOMYCIN: CPT | Performed by: INTERNAL MEDICINE

## 2025-05-26 PROCEDURE — 80048 BASIC METABOLIC PNL TOTAL CA: CPT | Performed by: INTERNAL MEDICINE

## 2025-05-26 RX ADMIN — APIXABAN 5 MG: 5 TABLET, FILM COATED ORAL at 08:56

## 2025-05-26 RX ADMIN — ATORVASTATIN CALCIUM 40 MG: 40 TABLET, FILM COATED ORAL at 08:56

## 2025-05-26 RX ADMIN — LEVOTHYROXINE SODIUM 62.5 MCG: 0.12 TABLET ORAL at 05:04

## 2025-05-26 RX ADMIN — LATANOPROST 1 DROP: 50 SOLUTION OPHTHALMIC at 21:00

## 2025-05-26 RX ADMIN — SODIUM CHLORIDE 1000 MG: 9 INJECTION, SOLUTION INTRAVENOUS at 13:58

## 2025-05-26 RX ADMIN — FERROUS SULFATE TAB 325 MG (65 MG ELEMENTAL FE) 325 MG: 325 (65 FE) TAB at 12:00

## 2025-05-26 RX ADMIN — NYSTATIN: 100000 POWDER TOPICAL at 08:57

## 2025-05-26 RX ADMIN — Medication 250 MG: at 08:56

## 2025-05-26 RX ADMIN — Medication 250 MG: at 20:59

## 2025-05-26 RX ADMIN — SERTRALINE HYDROCHLORIDE 100 MG: 100 TABLET ORAL at 08:56

## 2025-05-26 RX ADMIN — ASPIRIN 81 MG CHEWABLE TABLET 81 MG: 81 TABLET CHEWABLE at 08:56

## 2025-05-26 RX ADMIN — HYDRALAZINE HYDROCHLORIDE 100 MG: 50 TABLET ORAL at 21:00

## 2025-05-26 RX ADMIN — INSULIN LISPRO 1 UNITS: 100 INJECTION, SOLUTION INTRAVENOUS; SUBCUTANEOUS at 11:59

## 2025-05-26 RX ADMIN — METOPROLOL TARTRATE 50 MG: 50 TABLET, FILM COATED ORAL at 21:00

## 2025-05-26 RX ADMIN — HYDRALAZINE HYDROCHLORIDE 100 MG: 50 TABLET ORAL at 13:58

## 2025-05-26 RX ADMIN — INSULIN LISPRO 1 UNITS: 100 INJECTION, SOLUTION INTRAVENOUS; SUBCUTANEOUS at 21:04

## 2025-05-26 RX ADMIN — INSULIN LISPRO 1 UNITS: 100 INJECTION, SOLUTION INTRAVENOUS; SUBCUTANEOUS at 07:21

## 2025-05-26 RX ADMIN — INSULIN LISPRO 1 UNITS: 100 INJECTION, SOLUTION INTRAVENOUS; SUBCUTANEOUS at 16:49

## 2025-05-26 RX ADMIN — GABAPENTIN 200 MG: 100 CAPSULE ORAL at 08:56

## 2025-05-26 RX ADMIN — NYSTATIN: 100000 POWDER TOPICAL at 17:15

## 2025-05-26 RX ADMIN — HYDROCHLOROTHIAZIDE 25 MG: 25 TABLET ORAL at 08:56

## 2025-05-26 RX ADMIN — METOPROLOL TARTRATE 50 MG: 50 TABLET, FILM COATED ORAL at 08:56

## 2025-05-26 RX ADMIN — AMIODARONE HYDROCHLORIDE 100 MG: 100 TABLET ORAL at 08:56

## 2025-05-26 RX ADMIN — GABAPENTIN 200 MG: 100 CAPSULE ORAL at 21:00

## 2025-05-26 RX ADMIN — APIXABAN 5 MG: 5 TABLET, FILM COATED ORAL at 21:00

## 2025-05-26 RX ADMIN — INSULIN GLARGINE 50 UNITS: 100 INJECTION, SOLUTION SUBCUTANEOUS at 09:00

## 2025-05-26 RX ADMIN — LISINOPRIL 40 MG: 20 TABLET ORAL at 08:56

## 2025-05-26 RX ADMIN — AMLODIPINE BESYLATE 10 MG: 10 TABLET ORAL at 08:56

## 2025-05-26 NOTE — ASSESSMENT & PLAN NOTE
Stable.  No indication for transfusion at this time    Results from last 7 days   Lab Units 05/26/25  0514 05/24/25  0435 05/23/25  0525 05/22/25  1236   HEMOGLOBIN g/dL 10.5* 10.7* 10.9* 11.4*   MCV fL 87 84 84 84   RDW % 14.5 14.6 14.3 13.9

## 2025-05-26 NOTE — ASSESSMENT & PLAN NOTE
64-year-old female with a history of hypertension, hyperlipidemia, type 2 diabetes mellitus, CKD 3, CVA, and PAD status post bilateral AKA who was sent in here from [facility] due to right AKA stump wound.  Per vascular surgery recommendations appreciated.    Status post washout by vascular team   .  ID on board  Currently on ertapenem  Sensitivities have since been obtained  If no further washout will complete antibiotics potentially with oral antibiotics on 5/30  Possible plan for repeat washout  Follow-up further vascular recommendations and ID recommendations  Will be evaluated by vascular tomorrow

## 2025-05-26 NOTE — PROGRESS NOTES
Patient has completed vancomycin therapy per ID. Thank you for this consult; we will sign off now.

## 2025-05-26 NOTE — ASSESSMENT & PLAN NOTE
Patient presenting with draining sinus tract from right AKA site.  CT showing a thick-walled enhancing tract extending from anterior medial aspect of right lower extremity stump wound into distal right femoral stump. On exam there is a sinus tract with mild purulent drainage noted but no current radiographic evidence of abscess and patient has no cellulitis noted on exam.  She had a mild leukocytosis, though has down-trended and she is afebrile.  Patient is status post I&D of AKA stump on 5/23.  At surgery, there was no bone encountered.  Therefore, osteomyelitis is unlikely.  Wound culture from 5/22 with 2+ GNR's.  On review of prior cultures patient has grown and MDR Enterobacter (1/17/23, 1/27/24), Enterococcus (1/2023), MSSA (1/24/23), and Candida albicans.  Patient remains clinically and systemically, without sepsis or systemic toxicity.  OR culture is growing Enterobacter that is now resistant to ertapenem.  - Change antibiotic to IV meropenem  - No further need for IV vancomycin.  Will discontinue  - Wound care per vascular surgery  -Treat x 7-day effective antibiotic course  -Transition to p.o. doxycycline at discharge

## 2025-05-26 NOTE — PLAN OF CARE
Problem: Potential for Falls  Goal: Patient will remain free of falls  Description: INTERVENTIONS:  - Educate patient/family on patient safety including physical limitations  - Instruct patient to call for assistance with activity   - Consider consulting OT/PT to assist with strengthening/mobility based on AM PAC & JH-HLM score  - Consult OT/PT to assist with strengthening/mobility   - Keep Call bell within reach  - Keep bed low and locked with side rails adjusted as appropriate  - Keep care items and personal belongings within reach  - Initiate and maintain comfort rounds  - Make Fall Risk Sign visible to staff  - Offer Toileting every 2 Hours, in advance of need  - Initiate/Maintain BED alarm  - Apply yellow socks and bracelet for high fall risk patients  - Consider moving patient to room near nurses station  Outcome: Progressing     Problem: Prexisting or High Potential for Compromised Skin Integrity  Goal: Skin integrity is maintained or improved  Description: INTERVENTIONS:  - Identify patients at risk for skin breakdown  - Assess and monitor skin integrity including under and around medical devices   - Assess and monitor nutrition and hydration status  - Monitor labs  - Assess for incontinence   - Turn and reposition patient  - Assist with mobility/ambulation  - Relieve pressure over lesly prominences   - Avoid friction and shearing  - Provide appropriate hygiene as needed including keeping skin clean and dry  - Evaluate need for skin moisturizer/barrier cream  - Collaborate with interdisciplinary team  - Patient/family teaching  - Consider wound care consult    Assess:  - Review Nicanor scale daily  - Inspect skin when repositioning, toileting, and assisting with ADLS  Outcome: Progressing     Problem: PAIN - ADULT  Goal: Verbalizes/displays adequate comfort level or baseline comfort level  Description: Interventions:  - Encourage patient to monitor pain and request assistance  - Assess pain using  appropriate pain scale  - Administer analgesics as ordered based on type and severity of pain and evaluate response  - Implement non-pharmacological measures as appropriate and evaluate response  - Consider cultural and social influences on pain and pain management  - Notify physician/advanced practitioner if interventions unsuccessful or patient reports new pain  - Educate patient/family on pain management process including their role and importance of  reporting pain   - Provide non-pharmacologic/complimentary pain relief interventions  Outcome: Progressing     Problem: INFECTION - ADULT  Goal: Absence or prevention of progression during hospitalization  Description: INTERVENTIONS:  - Assess and monitor for signs and symptoms of infection  - Monitor lab/diagnostic results  - Monitor all insertion sites, i.e. indwelling lines, tubes, and drains  - Monitor endotracheal if appropriate and nasal secretions for changes in amount and color  - [place] appropriate cooling/warming therapies per order  - Administer medications as ordered  - Instruct and encourage patient and family to use good hand hygiene technique  - Identify and instruct in appropriate isolation precautions for identified infection/condition  Outcome: Progressing  Goal: Absence of fever/infection during neutropenic period  Description: INTERVENTIONS:  - Monitor WBC  - Perform strict hand hygiene  - Limit to healthy visitors only  - No plants, dried, fresh or silk flowers with hopkins in patient room  Outcome: Progressing     Problem: SAFETY ADULT  Goal: Maintain or return to baseline ADL function  Description: INTERVENTIONS:  -  Assess patient's ability to carry out ADLs; assess patient's baseline for ADL function and identify physical deficits which impact ability to perform ADLs (bathing, care of mouth/teeth, toileting, grooming, dressing, etc.)  - Assess/evaluate cause of self-care deficits   - Assess range of motion  - Assess patient's mobility;  develop plan if impaired  - Assess patient's need for assistive devices and provide as appropriate  - Encourage maximum independence but intervene and supervise when necessary  - Involve family in performance of ADLs  - Assess for home care needs following discharge   - Consider OT consult to assist with ADL evaluation and planning for discharge  - Provide patient education as appropriate  - Monitor functional capacity and physical performance, use of AM PAC & JH-HLM   - Monitor gait, balance and fatigue with ambulation    Outcome: Progressing     Problem: DISCHARGE PLANNING  Goal: Discharge to home or other facility with appropriate resources  Description: INTERVENTIONS:  - Identify barriers to discharge w/patient and caregiver  - Arrange for needed discharge resources and transportation as appropriate  - Identify discharge learning needs (meds, wound care, etc.)  - Arrange for interpretive services to assist at discharge as needed  - Refer to Case Management Department for coordinating discharge planning if the patient needs post-hospital services based on physician/advanced practitioner order or complex needs related to functional status, cognitive ability, or social support system  Outcome: Progressing     Problem: Knowledge Deficit  Goal: Patient/family/caregiver demonstrates understanding of disease process, treatment plan, medications, and discharge instructions  Description: Complete learning assessment and assess knowledge base.  Interventions:  - Provide teaching at level of understanding  - Provide teaching via preferred learning methods  Outcome: Progressing

## 2025-05-26 NOTE — PLAN OF CARE
Problem: Potential for Falls  Goal: Patient will remain free of falls  Description: INTERVENTIONS:  - Educate patient/family on patient safety including physical limitations  - Instruct patient to call for assistance with activity   - Consider consulting OT/PT to assist with strengthening/mobility based on AM PAC & JH-HLM score  - Consult OT/PT to assist with strengthening/mobility   - Keep Call bell within reach  - Keep bed low and locked with side rails adjusted as appropriate  - Keep care items and personal belongings within reach  - Initiate and maintain comfort rounds  - Make Fall Risk Sign visible to staff  - Offer Toileting every 2 Hours, in advance of need  - Initiate/Maintain BED alarm  - Apply yellow socks and bracelet for high fall risk patients  - Consider moving patient to room near nurses station  Outcome: Progressing     Problem: Prexisting or High Potential for Compromised Skin Integrity  Goal: Skin integrity is maintained or improved  Description: INTERVENTIONS:  - Identify patients at risk for skin breakdown  - Assess and monitor skin integrity including under and around medical devices   - Assess and monitor nutrition and hydration status  - Monitor labs  - Assess for incontinence   - Turn and reposition patient  - Assist with mobility/ambulation  - Relieve pressure over lesly prominences   - Avoid friction and shearing  - Provide appropriate hygiene as needed including keeping skin clean and dry  - Evaluate need for skin moisturizer/barrier cream  - Collaborate with interdisciplinary team  - Patient/family teaching  - Consider wound care consult    Assess:  - Review Nicanor scale daily  - Inspect skin when repositioning, toileting, and assisting with ADLS  Outcome: Progressing     Problem: PAIN - ADULT  Goal: Verbalizes/displays adequate comfort level or baseline comfort level  Description: Interventions:  - Encourage patient to monitor pain and request assistance  - Assess pain using  appropriate pain scale  - Administer analgesics as ordered based on type and severity of pain and evaluate response  - Implement non-pharmacological measures as appropriate and evaluate response  - Consider cultural and social influences on pain and pain management  - Notify physician/advanced practitioner if interventions unsuccessful or patient reports new pain  - Educate patient/family on pain management process including their role and importance of  reporting pain   - Provide non-pharmacologic/complimentary pain relief interventions  Outcome: Progressing     Problem: INFECTION - ADULT  Goal: Absence or prevention of progression during hospitalization  Description: INTERVENTIONS:  - Assess and monitor for signs and symptoms of infection  - Monitor lab/diagnostic results  - Monitor all insertion sites, i.e. indwelling lines, tubes, and drains  - Monitor endotracheal if appropriate and nasal secretions for changes in amount and color  - [place] appropriate cooling/warming therapies per order  - Administer medications as ordered  - Instruct and encourage patient and family to use good hand hygiene technique  - Identify and instruct in appropriate isolation precautions for identified infection/condition  Outcome: Progressing  Goal: Absence of fever/infection during neutropenic period  Description: INTERVENTIONS:  - Monitor WBC  - Perform strict hand hygiene  - Limit to healthy visitors only  - No plants, dried, fresh or silk flowers with hopkins in patient room  Outcome: Progressing     Problem: SAFETY ADULT  Goal: Maintain or return to baseline ADL function  Description: INTERVENTIONS:  -  Assess patient's ability to carry out ADLs; assess patient's baseline for ADL function and identify physical deficits which impact ability to perform ADLs (bathing, care of mouth/teeth, toileting, grooming, dressing, etc.)  - Assess/evaluate cause of self-care deficits   - Assess range of motion  - Assess patient's mobility;  develop plan if impaired  - Assess patient's need for assistive devices and provide as appropriate  - Encourage maximum independence but intervene and supervise when necessary  - Involve family in performance of ADLs  - Assess for home care needs following discharge   - Consider OT consult to assist with ADL evaluation and planning for discharge  - Provide patient education as appropriate  - Monitor functional capacity and physical performance, use of AM PAC & JH-HLM   - Monitor gait, balance and fatigue with ambulation    Outcome: Progressing     Problem: DISCHARGE PLANNING  Goal: Discharge to home or other facility with appropriate resources  Description: INTERVENTIONS:  - Identify barriers to discharge w/patient and caregiver  - Arrange for needed discharge resources and transportation as appropriate  - Identify discharge learning needs (meds, wound care, etc.)  - Arrange for interpretive services to assist at discharge as needed  - Refer to Case Management Department for coordinating discharge planning if the patient needs post-hospital services based on physician/advanced practitioner order or complex needs related to functional status, cognitive ability, or social support system  Outcome: Progressing     Problem: Knowledge Deficit  Goal: Patient/family/caregiver demonstrates understanding of disease process, treatment plan, medications, and discharge instructions  Description: Complete learning assessment and assess knowledge base.  Interventions:  - Provide teaching at level of understanding  - Provide teaching via preferred learning methods  Outcome: Progressing

## 2025-05-26 NOTE — PROGRESS NOTES
Progress Note - Infectious Disease   Name: Dianna Varghese 64 y.o. female I MRN: [id number]  Unit/Bed#: E5 -01 I Date of Admission: 5/22/2025   Date of Service: 5/26/2025 I Hospital Day: 4    Assessment & Plan  Complication of amputated stump (HCC)  Patient presenting with draining sinus tract from right AKA site.  CT showing a thick-walled enhancing tract extending from anterior medial aspect of right lower extremity stump wound into distal right femoral stump. On exam there is a sinus tract with mild purulent drainage noted but no current radiographic evidence of abscess and patient has no cellulitis noted on exam.  She had a mild leukocytosis, though has down-trended and she is afebrile.  Patient is status post I&D of AKA stump on 5/23.  At surgery, there was no bone encountered.  Therefore, osteomyelitis is unlikely.  Wound culture from 5/22 with 2+ GNR's.  On review of prior cultures patient has grown and MDR Enterobacter (1/17/23, 1/27/24), Enterococcus (1/2023), MSSA (1/24/23), and Candida albicans.  Patient remains clinically and systemically, without sepsis or systemic toxicity.  OR culture is growing Enterobacter that is now resistant to ertapenem.  - Change antibiotic to IV meropenem  - No further need for IV vancomycin.  Will discontinue  - Wound care per vascular surgery  -Treat x 7-day effective antibiotic course  -Transition to p.o. doxycycline at discharge  S/P bilateral above knee amputation (HCC)  In setting of PAD.  DM (diabetes mellitus) type II, controlled, with peripheral vascular disorder (HCC)  Lab Results   Component Value Date    HGBA1C 9.1 (A) 03/19/2025   Significant risk factor for poor wound healing and infection.  -Recommend tight glycemic control per primary team  -Patient will need close follow-up with PCP and/endocrinology for better diabetic control long-term    Discussed with patient in detail regarding the above plan.  I have discussed with Dr. Awad from primary service  regarding the above plan to modify antibiotic regimen.  He agrees with the plan.    Antibiotics:  Vancomycin/ertapenem  POD # 3    Subjective   Patient feels well.  Minimal pain in right stump.  Temperature stays down.  No chills.  She is tolerating antibiotics well.  No nausea, vomiting or diarrhea.    Objective :  Temp:  [97.8 °F (36.6 °C)-98.5 °F (36.9 °C)] 97.8 °F (36.6 °C)  HR:  [64-76] 70  BP: ()/(45-81) 136/45  Resp:  [15-16] 16  SpO2:  [88 %-97 %] 88 %  O2 Device: None (Room air)    Physical Exam:     General: Awake, alert, cooperative, no distress.   Neck:  Supple. No mass.  No lymphadenopathy.   Lungs: Expansion symmetric, no rales, no wheezing, respirations unlabored.   Heart:  Regular rate and rhythm, S1 and S2 normal, no murmur.   Abdomen: Soft, nondistended, non-tender, bowel sounds active all four quadrants, no masses, no organomegaly.   Extremities: Right AKA stump wound with packing in place.  Sparse serous drainage.  Very mild erythema/warmth.  No fluctuance.  Minimal tenderness.   Skin:  No rash.   Neuro: Moves all extremities.        Lab Results: I have reviewed the following results:  Results from last 7 days   Lab Units 05/26/25  0514 05/24/25  0435 05/23/25  0525   WBC Thousand/uL 12.95* 12.16* 12.55*   HEMOGLOBIN g/dL 10.5* 10.7* 10.9*   PLATELETS Thousands/uL 303 288 272     Results from last 7 days   Lab Units 05/26/25  0514 05/24/25  0435 05/23/25  0525 05/22/25  1236   SODIUM mmol/L 138 139 139 136   POTASSIUM mmol/L 4.3 3.8 4.0 4.9   CHLORIDE mmol/L 105 106 106 103   CO2 mmol/L 26 24 24 25   BUN mg/dL 27* 27* 28* 24   CREATININE mg/dL 1.50* 1.54* 1.54* 1.18   EGFR ml/min/1.73sq m 36 35 35 49   CALCIUM mg/dL 9.1 8.7 8.9 9.0   AST U/L  --   --  23 30   ALT U/L  --   --  32 36   ALK PHOS U/L  --   --  69 74   ALBUMIN g/dL  --   --  3.5 3.5     Results from last 7 days   Lab Units 05/23/25  1510 05/22/25  1641 05/22/25  1346 05/22/25  1236   BLOOD CULTURE   --   --  No Growth at 72  hrs. No Growth at 72 hrs.   GRAM STAIN RESULT  1+ Disintegrating polys  No bacteria seen  --   --  1+ Polys  No bacteria seen   WOUND CULTURE  1+ Growth of Enterobacter cloacae*  --   --  2+ Growth of Enterobacter cloacae*   MRSA CULTURE ONLY   --  No Methicillin Resistant Staphlyococcus aureus (MRSA) isolated  --   --

## 2025-05-26 NOTE — PLAN OF CARE
Problem: Potential for Falls  Goal: Patient will remain free of falls  Description: INTERVENTIONS:  - Educate patient/family on patient safety including physical limitations  - Instruct patient to call for assistance with activity   - Consider consulting OT/PT to assist with strengthening/mobility based on AM PAC & JH-HLM score  - Consult OT/PT to assist with strengthening/mobility   - Keep Call bell within reach  - Keep bed low and locked with side rails adjusted as appropriate  - Keep care items and personal belongings within reach  - Initiate and maintain comfort rounds  - Make Fall Risk Sign visible to staff  - Offer Toileting every 2 Hours, in advance of need  - Initiate/Maintain BED alarm  - Apply yellow socks and bracelet for high fall risk patients  - Consider moving patient to room near nurses station  Outcome: Progressing     Problem: Prexisting or High Potential for Compromised Skin Integrity  Goal: Skin integrity is maintained or improved  Description: INTERVENTIONS:  - Identify patients at risk for skin breakdown  - Assess and monitor skin integrity including under and around medical devices   - Assess and monitor nutrition and hydration status  - Monitor labs  - Assess for incontinence   - Turn and reposition patient  - Assist with mobility/ambulation  - Relieve pressure over lesly prominences   - Avoid friction and shearing  - Provide appropriate hygiene as needed including keeping skin clean and dry  - Evaluate need for skin moisturizer/barrier cream  - Collaborate with interdisciplinary team  - Patient/family teaching  - Consider wound care consult    Assess:  - Review Nicanor scale daily  - Inspect skin when repositioning, toileting, and assisting with ADLS  Outcome: Progressing     Problem: INFECTION - ADULT  Goal: Absence or prevention of progression during hospitalization  Description: INTERVENTIONS:  - Assess and monitor for signs and symptoms of infection  - Monitor lab/diagnostic results  -  Monitor all insertion sites, i.e. indwelling lines, tubes, and drains  - Monitor endotracheal if appropriate and nasal secretions for changes in amount and color  - [place] appropriate cooling/warming therapies per order  - Administer medications as ordered  - Instruct and encourage patient and family to use good hand hygiene technique  - Identify and instruct in appropriate isolation precautions for identified infection/condition  Outcome: Progressing     Problem: INFECTION - ADULT  Goal: Absence of fever/infection during neutropenic period  Description: INTERVENTIONS:  - Monitor WBC  - Perform strict hand hygiene  - Limit to healthy visitors only  - No plants, dried, fresh or silk flowers with hopkins in patient room  Outcome: Progressing     Problem: DISCHARGE PLANNING  Goal: Discharge to home or other facility with appropriate resources  Description: INTERVENTIONS:  - Identify barriers to discharge w/patient and caregiver  - Arrange for needed discharge resources and transportation as appropriate  - Identify discharge learning needs (meds, wound care, etc.)  - Arrange for interpretive services to assist at discharge as needed  - Refer to Case Management Department for coordinating discharge planning if the patient needs post-hospital services based on physician/advanced practitioner order or complex needs related to functional status, cognitive ability, or social support system  Outcome: Progressing

## 2025-05-26 NOTE — PROGRESS NOTES
Progress Note - Hospitalist   Name: Dianna Varghese 64 y.o. female I MRN: [id number]  Unit/Bed#: E5 -01 I Date of Admission: 5/22/2025   Date of Service: 5/26/2025 I Hospital Day: 4    Assessment & Plan  Complication of amputated stump (HCC)  64-year-old female with a history of hypertension, hyperlipidemia, type 2 diabetes mellitus, CKD 3, CVA, and PAD status post bilateral AKA who was sent in here from [facility] due to right AKA stump wound.  Per vascular surgery recommendations appreciated.    Status post washout by vascular team   .  ID on board  Currently on ertapenem  Sensitivities have since been obtained  If no further washout will complete antibiotics potentially with oral antibiotics on 5/30  Possible plan for repeat washout  Follow-up further vascular recommendations and ID recommendations  Will be evaluated by vascular tomorrow      PAD (peripheral artery disease) (HCC)  Status post left AKA 11/2022  Status post right AKA stump revision 1/2023  Continue aspirin, statin, hold eliquis for surgery (On Eliquis for PAD / stroke. Of note, patient had a prior history of HIT)  Continue Eliquis  Esophageal reflux  Continue as needed calcium carbonate.  Pantoprazole currently not in her medication list it appears that this has been recently discontinued as per documentation.  DM (diabetes mellitus) type II, controlled, with peripheral vascular disorder (HCC)  Lab Results   Component Value Date    HGBA1C 9.1 (A) 03/19/2025       Recent Labs     05/25/25  1057 05/25/25  1606 05/25/25  2106 05/26/25  0714   POCGLU 244* 175* 138 158*       Blood Sugar Average: Last 72 hrs:  (P) 155.8923497722999061  Home regimen is glargine 65 units at bedtime.  Semaglutide on hold. Decrease home glargine to 50U HS while inpatient and continue with sliding scale   Monitor blood glucose  Anxiety and depression  Continue sertraline and as needed Xanax  Hypertension  Bp controlled  Resume hydrochlorothiazide, amlodipine,  hydralazine, metoprolol, lisinopril  Anemia  Stable.  No indication for transfusion at this time    Results from last 7 days   Lab Units 05/26/25  0514 05/24/25  0435 05/23/25  0525 05/22/25  1236   HEMOGLOBIN g/dL 10.5* 10.7* 10.9* 11.4*   MCV fL 87 84 84 84   RDW % 14.5 14.6 14.3 13.9       S/P bilateral above knee amputation (HCC)  Bilateral AKA, continue supportive care  Acquired hypothyroidism  Continue levothyroxine    VTE Pharmacologic Prophylaxis:   Moderate Risk (Score 3-4) - Pharmacological DVT Prophylaxis Ordered: apixaban (Eliquis).    Mobility:   Basic Mobility Inpatient Raw Score: 10  -HLM Goal: 4: Move to chair/commode  JH-HLM Achieved: 2: Bed activities/Dependent transfer  JH-HLM Goal achieved. Continue to encourage appropriate mobility.    Patient Centered Rounds: I performed bedside rounds with nursing staff today.   Discussions with Specialists or Other Care Team Provider: cm, nursing    Education and Discussions with Family / Patient: Patient declined call to .     Current Length of Stay: 4 day(s)  Current Patient Status: Inpatient   Certification Statement: The patient will continue to require additional inpatient hospital stay due to see below  Discharge Plan: Pending further vascular evaluation for possible repeat washout.  No further washout anticipate can likely be transition to oral antibiotics tomorrow with discharge    Code Status: Level 1 - Full Code    Subjective   Denies chest pain, fevers or any complaints    Objective :  Temp:  [98 °F (36.7 °C)-98.5 °F (36.9 °C)] 98 °F (36.7 °C)  HR:  [64-76] 68  BP: ()/(47-81) 120/53  Resp:  [15-16] 16  SpO2:  [88 %-97 %] 91 %  O2 Device: None (Room air)    Body mass index is 38.39 kg/m².     Input and Output Summary (last 24 hours):     Intake/Output Summary (Last 24 hours) at 5/26/2025 1025  Last data filed at 5/26/2025 0901  Gross per 24 hour   Intake 660 ml   Output --   Net 660 ml       Physical Exam  Constitutional:        General: She is not in acute distress.     Appearance: She is well-developed. She is not diaphoretic.   HENT:      Head: Normocephalic and atraumatic.      Nose: Nose normal.      Mouth/Throat:      Pharynx: No oropharyngeal exudate.     Eyes:      General: No scleral icterus.        Right eye: No discharge.         Left eye: No discharge.      Conjunctiva/sclera: Conjunctivae normal.     Neck:      Thyroid: No thyromegaly.      Vascular: No JVD.     Cardiovascular:      Rate and Rhythm: Normal rate and regular rhythm.      Heart sounds: Normal heart sounds. No murmur heard.     No friction rub. No gallop.   Pulmonary:      Effort: Pulmonary effort is normal. No respiratory distress.      Breath sounds: Normal breath sounds. No wheezing or rales.   Chest:      Chest wall: No tenderness.   Abdominal:      General: Bowel sounds are normal. There is no distension.      Palpations: Abdomen is soft.      Tenderness: There is no abdominal tenderness. There is no guarding or rebound.     Musculoskeletal:         General: No tenderness or deformity. Normal range of motion.      Cervical back: Normal range of motion and neck supple.     Skin:     General: Skin is warm and dry.      Findings: No erythema or rash.     Neurological:      Mental Status: She is alert. Mental status is at baseline.      Cranial Nerves: No cranial nerve deficit.      Sensory: No sensory deficit.      Motor: No abnormal muscle tone.      Coordination: Coordination normal.           Lines/Drains:              Lab Results: I have reviewed the following results:   Results from last 7 days   Lab Units 05/26/25  0514   WBC Thousand/uL 12.95*   HEMOGLOBIN g/dL 10.5*   HEMATOCRIT % 34.0*   PLATELETS Thousands/uL 303   SEGS PCT % 68   LYMPHO PCT % 18   MONO PCT % 8   EOS PCT % 4     Results from last 7 days   Lab Units 05/26/25  0514 05/24/25  0435 05/23/25  0525   SODIUM mmol/L 138   < > 139   POTASSIUM mmol/L 4.3   < > 4.0   CHLORIDE mmol/L 105   < > 106    CO2 mmol/L 26   < > 24   BUN mg/dL 27*   < > 28*   CREATININE mg/dL 1.50*   < > 1.54*   ANION GAP mmol/L 7   < > 9   CALCIUM mg/dL 9.1   < > 8.9   ALBUMIN g/dL  --   --  3.5   TOTAL BILIRUBIN mg/dL  --   --  0.21   ALK PHOS U/L  --   --  69   ALT U/L  --   --  32   AST U/L  --   --  23   GLUCOSE RANDOM mg/dL 134   < > 122    < > = values in this interval not displayed.     Results from last 7 days   Lab Units 05/23/25  1251   INR  1.40*     Results from last 7 days   Lab Units 05/26/25  0714 05/25/25  2106 05/25/25  1606 05/25/25  1057 05/25/25  0712 05/24/25  2048 05/24/25  1548 05/24/25  1119 05/24/25  0713 05/23/25  2102 05/23/25  1548 05/23/25  1205   POC GLUCOSE mg/dl 158* 138 175* 244* 189* 151* 151* 178* 128 162* 89 123         Results from last 7 days   Lab Units 05/22/25  1346   LACTIC ACID mmol/L 0.9       Recent Cultures (last 7 days):   Results from last 7 days   Lab Units 05/23/25  1510 05/22/25  1346 05/22/25  1236   BLOOD CULTURE   --  No Growth at 72 hrs. No Growth at 72 hrs.   GRAM STAIN RESULT  1+ Disintegrating polys  No bacteria seen  --  1+ Polys  No bacteria seen   WOUND CULTURE  1+ Growth of Enterobacter cloacae*  --  2+ Growth of Enterobacter cloacae*       Imaging Results Review: I personally reviewed the following image studies/reports in PACS and discussed pertinent findings with Radiology: chest xray. My interpretation of the radiology images/reports is:  .  Other Study Results Review: EKG was reviewed.     Last 24 Hours Medication List:     Current Facility-Administered Medications:     acetaminophen (TYLENOL) tablet 650 mg, Q4H PRN    ALPRAZolam (XANAX) tablet 0.25 mg, Daily PRN    amiodarone tablet 100 mg, Daily    amLODIPine (NORVASC) tablet 10 mg, Daily    apixaban (ELIQUIS) tablet 5 mg, BID    aspirin chewable tablet 81 mg, Daily    atorvastatin (LIPITOR) tablet 40 mg, Daily    calcium carbonate (TUMS) chewable tablet 1,000 mg, Q6H PRN    ertapenem (INVanz) 1,000 mg in sodium  chloride 0.9 % 50 mL IVPB, Q24H, Last Rate: 1,000 mg (05/25/25 1706)    ferrous sulfate tablet 325 mg, Daily With Lunch    gabapentin (NEURONTIN) capsule 200 mg, BID    hydrALAZINE (APRESOLINE) tablet 100 mg, Q8H MICHELLE    hydroCHLOROthiazide tablet 25 mg, Daily    insulin glargine (LANTUS) subcutaneous injection 50 Units 0.5 mL, QAM    insulin lispro (HumALOG/ADMELOG) 100 units/mL subcutaneous injection 1-6 Units, 4x Daily (AC & HS) **AND** Fingerstick Glucose (POCT), 4x Daily AC and at bedtime    latanoprost (XALATAN) 0.005 % ophthalmic solution 1 drop, HS    levothyroxine tablet 62.5 mcg, Early Morning    lisinopril (ZESTRIL) tablet 40 mg, Daily    loratadine (CLARITIN) tablet 10 mg, HS PRN    metoprolol tartrate (LOPRESSOR) tablet 50 mg, Q12H MICHELLE    nystatin (MYCOSTATIN) powder, BID    polyethylene glycol (MIRALAX) packet 17 g, Daily PRN    saccharomyces boulardii (FLORASTOR) capsule 250 mg, BID    sertraline (ZOLOFT) tablet 100 mg, Daily    vancomycin (VANCOCIN) IVPB (premix in dextrose) 1,000 mg 200 mL, Daily PRN    Administrative Statements   Today, Patient Was Seen By: Varun Awad MD  I have spent a total time of 30 minutes in caring for this patient on the day of the visit/encounter including Diagnostic results.    **Please Note: This note may have been constructed using a voice recognition system.**

## 2025-05-26 NOTE — ASSESSMENT & PLAN NOTE
Lab Results   Component Value Date    HGBA1C 9.1 (A) 03/19/2025       Recent Labs     05/25/25  1057 05/25/25  1606 05/25/25  2106 05/26/25  0714   POCGLU 244* 175* 138 158*       Blood Sugar Average: Last 72 hrs:  (P) 155.7238550708285739  Home regimen is glargine 65 units at bedtime.  Semaglutide on hold. Decrease home glargine to 50U HS while inpatient and continue with sliding scale   Monitor blood glucose

## 2025-05-27 PROBLEM — T80.1XXA IV INFILTRATE: Status: ACTIVE | Noted: 2025-05-27

## 2025-05-27 LAB
ANION GAP SERPL CALCULATED.3IONS-SCNC: 8 MMOL/L (ref 4–13)
BACTERIA BLD CULT: NORMAL
BACTERIA BLD CULT: NORMAL
BASOPHILS # BLD AUTO: 0.06 THOUSANDS/ÂΜL (ref 0–0.1)
BASOPHILS NFR BLD AUTO: 1 % (ref 0–1)
BUN SERPL-MCNC: 27 MG/DL (ref 5–25)
CALCIUM SERPL-MCNC: 8.6 MG/DL (ref 8.4–10.2)
CHLORIDE SERPL-SCNC: 105 MMOL/L (ref 96–108)
CO2 SERPL-SCNC: 24 MMOL/L (ref 21–32)
CREAT SERPL-MCNC: 1.54 MG/DL (ref 0.6–1.3)
EOSINOPHIL # BLD AUTO: 0.44 THOUSAND/ÂΜL (ref 0–0.61)
EOSINOPHIL NFR BLD AUTO: 4 % (ref 0–6)
ERYTHROCYTE [DISTWIDTH] IN BLOOD BY AUTOMATED COUNT: 14.3 % (ref 11.6–15.1)
GFR SERPL CREATININE-BSD FRML MDRD: 35 ML/MIN/1.73SQ M
GLUCOSE SERPL-MCNC: 162 MG/DL (ref 65–140)
GLUCOSE SERPL-MCNC: 173 MG/DL (ref 65–140)
GLUCOSE SERPL-MCNC: 175 MG/DL (ref 65–140)
GLUCOSE SERPL-MCNC: 183 MG/DL (ref 65–140)
GLUCOSE SERPL-MCNC: 274 MG/DL (ref 65–140)
HCT VFR BLD AUTO: 32.6 % (ref 34.8–46.1)
HGB BLD-MCNC: 10.2 G/DL (ref 11.5–15.4)
IMM GRANULOCYTES # BLD AUTO: 0.13 THOUSAND/UL (ref 0–0.2)
IMM GRANULOCYTES NFR BLD AUTO: 1 % (ref 0–2)
LYMPHOCYTES # BLD AUTO: 1.76 THOUSANDS/ÂΜL (ref 0.6–4.47)
LYMPHOCYTES NFR BLD AUTO: 15 % (ref 14–44)
MCH RBC QN AUTO: 27.2 PG (ref 26.8–34.3)
MCHC RBC AUTO-ENTMCNC: 31.3 G/DL (ref 31.4–37.4)
MCV RBC AUTO: 87 FL (ref 82–98)
MONOCYTES # BLD AUTO: 0.84 THOUSAND/ÂΜL (ref 0.17–1.22)
MONOCYTES NFR BLD AUTO: 7 % (ref 4–12)
NEUTROPHILS # BLD AUTO: 8.69 THOUSANDS/ÂΜL (ref 1.85–7.62)
NEUTS SEG NFR BLD AUTO: 72 % (ref 43–75)
NRBC BLD AUTO-RTO: 0 /100 WBCS
PLATELET # BLD AUTO: 260 THOUSANDS/UL (ref 149–390)
PMV BLD AUTO: 9.4 FL (ref 8.9–12.7)
POTASSIUM SERPL-SCNC: 4.2 MMOL/L (ref 3.5–5.3)
RBC # BLD AUTO: 3.75 MILLION/UL (ref 3.81–5.12)
SODIUM SERPL-SCNC: 137 MMOL/L (ref 135–147)
WBC # BLD AUTO: 11.92 THOUSAND/UL (ref 4.31–10.16)

## 2025-05-27 PROCEDURE — G0545 PR INHERENT VISIT TO INPT: HCPCS | Performed by: INTERNAL MEDICINE

## 2025-05-27 PROCEDURE — 99232 SBSQ HOSP IP/OBS MODERATE 35: CPT | Performed by: INTERNAL MEDICINE

## 2025-05-27 PROCEDURE — 99232 SBSQ HOSP IP/OBS MODERATE 35: CPT | Performed by: NURSE PRACTITIONER

## 2025-05-27 PROCEDURE — 82948 REAGENT STRIP/BLOOD GLUCOSE: CPT

## 2025-05-27 PROCEDURE — 80048 BASIC METABOLIC PNL TOTAL CA: CPT | Performed by: INTERNAL MEDICINE

## 2025-05-27 PROCEDURE — 85025 COMPLETE CBC W/AUTO DIFF WBC: CPT | Performed by: INTERNAL MEDICINE

## 2025-05-27 RX ADMIN — HYDROCHLOROTHIAZIDE 25 MG: 25 TABLET ORAL at 09:52

## 2025-05-27 RX ADMIN — APIXABAN 5 MG: 5 TABLET, FILM COATED ORAL at 21:07

## 2025-05-27 RX ADMIN — NYSTATIN: 100000 POWDER TOPICAL at 17:20

## 2025-05-27 RX ADMIN — Medication 250 MG: at 09:49

## 2025-05-27 RX ADMIN — AMLODIPINE BESYLATE 10 MG: 10 TABLET ORAL at 09:52

## 2025-05-27 RX ADMIN — HYDRALAZINE HYDROCHLORIDE 100 MG: 50 TABLET ORAL at 21:08

## 2025-05-27 RX ADMIN — METOPROLOL TARTRATE 50 MG: 50 TABLET, FILM COATED ORAL at 09:52

## 2025-05-27 RX ADMIN — Medication 250 MG: at 21:07

## 2025-05-27 RX ADMIN — FERROUS SULFATE TAB 325 MG (65 MG ELEMENTAL FE) 325 MG: 325 (65 FE) TAB at 11:31

## 2025-05-27 RX ADMIN — ASPIRIN 81 MG CHEWABLE TABLET 81 MG: 81 TABLET CHEWABLE at 09:49

## 2025-05-27 RX ADMIN — SODIUM CHLORIDE 1000 MG: 9 INJECTION, SOLUTION INTRAVENOUS at 14:29

## 2025-05-27 RX ADMIN — INSULIN GLARGINE 50 UNITS: 100 INJECTION, SOLUTION SUBCUTANEOUS at 09:50

## 2025-05-27 RX ADMIN — SERTRALINE HYDROCHLORIDE 100 MG: 100 TABLET ORAL at 09:50

## 2025-05-27 RX ADMIN — INSULIN LISPRO 1 UNITS: 100 INJECTION, SOLUTION INTRAVENOUS; SUBCUTANEOUS at 16:18

## 2025-05-27 RX ADMIN — HYDRALAZINE HYDROCHLORIDE 100 MG: 50 TABLET ORAL at 14:28

## 2025-05-27 RX ADMIN — AMIODARONE HYDROCHLORIDE 100 MG: 100 TABLET ORAL at 09:50

## 2025-05-27 RX ADMIN — GABAPENTIN 200 MG: 100 CAPSULE ORAL at 09:50

## 2025-05-27 RX ADMIN — METOPROLOL TARTRATE 50 MG: 50 TABLET, FILM COATED ORAL at 21:08

## 2025-05-27 RX ADMIN — NYSTATIN: 100000 POWDER TOPICAL at 09:51

## 2025-05-27 RX ADMIN — INSULIN LISPRO 4 UNITS: 100 INJECTION, SOLUTION INTRAVENOUS; SUBCUTANEOUS at 11:31

## 2025-05-27 RX ADMIN — GABAPENTIN 200 MG: 100 CAPSULE ORAL at 21:06

## 2025-05-27 RX ADMIN — INSULIN LISPRO 1 UNITS: 100 INJECTION, SOLUTION INTRAVENOUS; SUBCUTANEOUS at 21:25

## 2025-05-27 RX ADMIN — LATANOPROST 1 DROP: 50 SOLUTION OPHTHALMIC at 21:09

## 2025-05-27 RX ADMIN — APIXABAN 5 MG: 5 TABLET, FILM COATED ORAL at 09:50

## 2025-05-27 RX ADMIN — HYDRALAZINE HYDROCHLORIDE 100 MG: 50 TABLET ORAL at 05:25

## 2025-05-27 RX ADMIN — ATORVASTATIN CALCIUM 40 MG: 40 TABLET, FILM COATED ORAL at 09:49

## 2025-05-27 RX ADMIN — LEVOTHYROXINE SODIUM 62.5 MCG: 0.12 TABLET ORAL at 05:25

## 2025-05-27 RX ADMIN — LISINOPRIL 40 MG: 20 TABLET ORAL at 09:52

## 2025-05-27 RX ADMIN — INSULIN LISPRO 1 UNITS: 100 INJECTION, SOLUTION INTRAVENOUS; SUBCUTANEOUS at 09:50

## 2025-05-27 RX ADMIN — SODIUM CHLORIDE 1000 MG: 9 INJECTION, SOLUTION INTRAVENOUS at 01:54

## 2025-05-27 NOTE — PROGRESS NOTES
Progress Note - Hospitalist   Name: Dianna Varghese 64 y.o. female I MRN: [id number]  Unit/Bed#: E5 -01 I Date of Admission: 5/22/2025   Date of Service: 5/27/2025 I Hospital Day: 5    Assessment & Plan  Complication of amputated stump (HCC)  64-year-old female with a history of hypertension, hyperlipidemia, type 2 diabetes mellitus, CKD 3, CVA, and PAD status post bilateral AKA who was sent in here from [facility] due to right AKA stump wound.  Per vascular surgery recommendations appreciated.    Status post washout by vascular team  .  ID on board  Currently on ertapenem  Sensitivities have since been obtained  Vascular followed up but no further washout was recommended  Will change to doxy at discharge         PAD (peripheral artery disease) (HCC)  Status post left AKA 11/2022  Status post right AKA stump revision 1/2023  Continue aspirin, statin, hold eliquis for surgery (On Eliquis for PAD / stroke. Of note, patient had a prior history of HIT)  Continue Eliquis  Esophageal reflux  Continue as needed calcium carbonate.  Pantoprazole currently not in her medication list it appears that this has been recently discontinued as per documentation.  DM (diabetes mellitus) type II, controlled, with peripheral vascular disorder (HCC)  Lab Results   Component Value Date    HGBA1C 9.1 (A) 03/19/2025       Recent Labs     05/26/25 2028 05/27/25  0728 05/27/25  1110 05/27/25  1542   POCGLU 181* 175* 274* 173*       Blood Sugar Average: Last 72 hrs:  (P) 178.4178332903266562  Home regimen is glargine 65 units at bedtime.  Semaglutide on hold. Decrease home glargine to 50U HS while inpatient and continue with sliding scale   Monitor blood glucose  Anxiety and depression  Continue sertraline and as needed Xanax  Hypertension  Bp controlled  Resume hydrochlorothiazide, amlodipine, hydralazine, metoprolol, lisinopril  Anemia  Stable.  No indication for transfusion at this time    Results from last 7 days   Lab Units  05/27/25  0620 05/26/25  0514 05/24/25  0435 05/23/25  0525 05/22/25  1236   HEMOGLOBIN g/dL 10.2* 10.5* 10.7* 10.9* 11.4*   MCV fL 87 87 84 84 84   RDW % 14.3 14.5 14.6 14.3 13.9       S/P bilateral above knee amputation (HCC)  Bilateral AKA, continue supportive care  Acquired hypothyroidism  Continue levothyroxine  IV infiltrate  Right arm infiltrate  Continue warm compress  Will monitor site     VTE Pharmacologic Prophylaxis:   eliquis     Mobility:   Basic Mobility Inpatient Raw Score: 10  -[facility] Goal: 4: Move to chair/commode  -[facility] Achieved: 2: Bed activities/Dependent transfer      Patient Centered Rounds: I performed bedside rounds with nursing staff today.     Education and Discussions with Family / Patient: Patient declined call to .     Current Length of Stay: 5 day(s)  Current Patient Status: Inpatient   Certification Statement: eval by vascular for possible washout.   Discharge Plan: Anticipate discharge tomorrow to facility     Code Status: Level 1 - Full Code    Subjective   Pt seen and examined. She stated that her iv site was stinging. No other problems were noted     Objective :  Temp:  [97.5 °F (36.4 °C)-98.8 °F (37.1 °C)] 98.8 °F (37.1 °C)  HR:  [62-72] 69  BP: (110-141)/(43-81) 133/52  Resp:  [16-18] 18  SpO2:  [91 %-96 %] 91 %  O2 Device: None (Room air)    Body mass index is 38.39 kg/m².     Input and Output Summary (last 24 hours):     Intake/Output Summary (Last 24 hours) at 5/27/2025 1646  Last data filed at 5/27/2025 0731  Gross per 24 hour   Intake 180 ml   Output --   Net 180 ml       Physical Exam  Constitutional:       Appearance: Normal appearance.   HENT:      Head: Normocephalic and atraumatic.     Eyes:      Extraocular Movements: Extraocular movements intact.      Pupils: Pupils are equal, round, and reactive to light.       Cardiovascular:      Rate and Rhythm: Normal rate and regular rhythm.      Heart sounds: No murmur heard.     No friction rub. No gallop.    Pulmonary:      Effort: Pulmonary effort is normal. No respiratory distress.      Breath sounds: Normal breath sounds. No wheezing, rhonchi or rales.   Abdominal:      General: There is no distension.      Palpations: Abdomen is soft.      Tenderness: There is no abdominal tenderness. There is no guarding or rebound.     Skin:     Comments: Right arm raised area near iv      Neurological:      Mental Status: She is alert and oriented to person, place, and time.         Lines/Drains:      Lab Results: I have reviewed the following results:   Results from last 7 days   Lab Units 05/27/25  0620   WBC Thousand/uL 11.92*   HEMOGLOBIN g/dL 10.2*   HEMATOCRIT % 32.6*   PLATELETS Thousands/uL 260   SEGS PCT % 72   LYMPHO PCT % 15   MONO PCT % 7   EOS PCT % 4     Results from last 7 days   Lab Units 05/27/25  0620 05/24/25  0435 05/23/25  0525   SODIUM mmol/L 137   < > 139   POTASSIUM mmol/L 4.2   < > 4.0   CHLORIDE mmol/L 105   < > 106   CO2 mmol/L 24   < > 24   BUN mg/dL 27*   < > 28*   CREATININE mg/dL 1.54*   < > 1.54*   ANION GAP mmol/L 8   < > 9   CALCIUM mg/dL 8.6   < > 8.9   ALBUMIN g/dL  --   --  3.5   TOTAL BILIRUBIN mg/dL  --   --  0.21   ALK PHOS U/L  --   --  69   ALT U/L  --   --  32   AST U/L  --   --  23   GLUCOSE RANDOM mg/dL 162*   < > 122    < > = values in this interval not displayed.     Results from last 7 days   Lab Units 05/23/25  1251   INR  1.40*     Results from last 7 days   Lab Units 05/27/25  1542 05/27/25  1110 05/27/25  0728 05/26/25  2028 05/26/25  1615 05/26/25  1122 05/26/25  0714 05/25/25  2106 05/25/25  1606 05/25/25  1057 05/25/25  0712 05/24/25  2048   POC GLUCOSE mg/dl 173* 274* 175* 181* 180* 180* 158* 138 175* 244* 189* 151*         Results from last 7 days   Lab Units 05/22/25  1346   LACTIC ACID mmol/L 0.9       Recent Cultures (last 7 days):   Results from last 7 days   Lab Units 05/23/25  1510 05/22/25  1346 05/22/25  1236   BLOOD CULTURE   --  No Growth After 4 Days. No Growth  After 4 Days.   GRAM STAIN RESULT  1+ Disintegrating polys  No bacteria seen  --  1+ Polys  No bacteria seen   WOUND CULTURE  1+ Growth of Enterobacter cloacae*  --  2+ Growth of Enterobacter cloacae*       Imaging Results Review: No pertinent imaging studies reviewed.  Other Study Results Review: No additional pertinent studies reviewed.    Last 24 Hours Medication List:     Current Facility-Administered Medications:     acetaminophen (TYLENOL) tablet 650 mg, Q4H PRN    ALPRAZolam (XANAX) tablet 0.25 mg, Daily PRN    amiodarone tablet 100 mg, Daily    amLODIPine (NORVASC) tablet 10 mg, Daily    apixaban (ELIQUIS) tablet 5 mg, BID    aspirin chewable tablet 81 mg, Daily    atorvastatin (LIPITOR) tablet 40 mg, Daily    calcium carbonate (TUMS) chewable tablet 1,000 mg, Q6H PRN    ferrous sulfate tablet 325 mg, Daily With Lunch    gabapentin (NEURONTIN) capsule 200 mg, BID    hydrALAZINE (APRESOLINE) tablet 100 mg, Q8H MICHELLE    hydroCHLOROthiazide tablet 25 mg, Daily    insulin glargine (LANTUS) subcutaneous injection 50 Units 0.5 mL, QAM    insulin lispro (HumALOG/ADMELOG) 100 units/mL subcutaneous injection 1-6 Units, 4x Daily (AC & HS) **AND** Fingerstick Glucose (POCT), 4x Daily AC and at bedtime    latanoprost (XALATAN) 0.005 % ophthalmic solution 1 drop, HS    levothyroxine tablet 62.5 mcg, Early Morning    lisinopril (ZESTRIL) tablet 40 mg, Daily    loratadine (CLARITIN) tablet 10 mg, HS PRN    meropenem (MERREM) 1,000 mg in sodium chloride 0.9 % 100 mL IVPB, Q12H, Last Rate: 1,000 mg (05/27/25 5429)    metoprolol tartrate (LOPRESSOR) tablet 50 mg, Q12H MICHELLE    nystatin (MYCOSTATIN) powder, BID    polyethylene glycol (MIRALAX) packet 17 g, Daily PRN    saccharomyces boulardii (FLORASTOR) capsule 250 mg, BID    sertraline (ZOLOFT) tablet 100 mg, Daily    Administrative Statements   Today, Patient Was Seen By: Mindi Dietz DO

## 2025-05-27 NOTE — ASSESSMENT & PLAN NOTE
Lab Results   Component Value Date    HGBA1C 9.1 (A) 03/19/2025       Recent Labs     05/26/25 2028 05/27/25  0728 05/27/25  1110 05/27/25  1542   POCGLU 181* 175* 274* 173*       Blood Sugar Average: Last 72 hrs:  (P) 178.3691545529572196  Home regimen is glargine 65 units at bedtime.  Semaglutide on hold. Decrease home glargine to 50U HS while inpatient and continue with sliding scale   Monitor blood glucose

## 2025-05-27 NOTE — ASSESSMENT & PLAN NOTE
64-year-old female with a history of hypertension, hyperlipidemia, type 2 diabetes mellitus, CKD 3, CVA, and PAD status post bilateral AKA who was sent in here from [facility] due to right AKA stump wound.  Per vascular surgery recommendations appreciated.    Status post washout by vascular team  .  ID on board  Currently on ertapenem  Sensitivities have since been obtained  Vascular followed up but no further washout was recommended  Will change to doxy at discharge

## 2025-05-27 NOTE — DISCHARGE INSTR - AVS FIRST PAGE
DISCHARGE INSTRUCTIONS  WOUND CARE    DIET:  Resume your normal diet.  Good nutrition is important for healing of your incision.  If you are discharged on narcotics for pain control, continue taking your stool softeners until you are having regular bowel movements.    INCISION:  Remove old packing. Irrigate R AKA wound with sterile saline. Pack incision with new 1/2 inch iodoform gauze and cover with a 4x4 and ABD. Change dressing daily.     DO NOT put any powders, creams, ointments, or lotions around the wound.    FOLLOW UP APPOINTMENTS:  Making and keeping follow up appointments and ultrasound tests are important to your recovery.  If you have difficulty making it to or keeping your follow up appointments, call the office.    If you have increased pain, fever >101.5, nausea, vomiting, increased drainage, redness, warmth, swelling, change in color of drainage/pus, or a bad smell at your wound site, new coldness/numbness of your arm or leg, or become dizzy or confused please call us immediately and GO directly to the ER.    PLEASE CALL THE OFFICE IF YOU HAVE ANY QUESTIONS  [phone]  [phone] [address], [address], [place] [address]  [address] [place] [address]  [address] [place] [address]  [address] [facility], [place] [address]  [address] [facility], [address], [place] [address]  [address] [facility], [address], [place] [address]  [address] [facility], [facility], [address] Floor, [place] [address]  [address] [facility], 1st Floor, [address], [place] [address]  [address] [place] [address]  [address] [facility], [address], [place] [address]

## 2025-05-27 NOTE — ASSESSMENT & PLAN NOTE
Patient presenting with draining sinus tract from right AKA site.  CT showing a thick-walled enhancing tract extending from anterior medial aspect of right lower extremity stump wound into distal right femoral stump. On exam there is a sinus tract with mild purulent drainage noted but no current radiographic evidence of abscess and patient has no cellulitis noted on exam.  She had a mild leukocytosis, though has down-trended and she is afebrile.  Patient is status post I&D of AKA stump on 5/23.  At surgery, there was no bone encountered.  Therefore, osteomyelitis is unlikely.  Wound culture from 5/22 with 2+ GNR's.  On review of prior cultures patient has grown and MDR Enterobacter (1/17/23, 1/27/24), Enterococcus (1/2023), MSSA (1/24/23), and Candida albicans.  Patient remains clinically and systemically, without sepsis or systemic toxicity.  OR culture is growing Enterobacter that is resistant to ertapenem.  - Continue IV meropenem  - Wound care per vascular surgery  -Treat x 7-day effective antibiotic course, through 6/2  -Transition to p.o. doxycycline at discharge

## 2025-05-27 NOTE — PROGRESS NOTES
Progress Note - Infectious Disease   Name: Dianna Varghese 64 y.o. female I MRN: [id number]  Unit/Bed#: E5 -01 I Date of Admission: 5/22/2025   Date of Service: 5/27/2025 I Hospital Day: 5    Assessment & Plan  Complication of amputated stump (HCC)  Patient presenting with draining sinus tract from right AKA site.  CT showing a thick-walled enhancing tract extending from anterior medial aspect of right lower extremity stump wound into distal right femoral stump. On exam there is a sinus tract with mild purulent drainage noted but no current radiographic evidence of abscess and patient has no cellulitis noted on exam.  She had a mild leukocytosis, though has down-trended and she is afebrile.  Patient is status post I&D of AKA stump on 5/23.  At surgery, there was no bone encountered.  Therefore, osteomyelitis is unlikely.  Wound culture from 5/22 with 2+ GNR's.  On review of prior cultures patient has grown and MDR Enterobacter (1/17/23, 1/27/24), Enterococcus (1/2023), MSSA (1/24/23), and Candida albicans.  Patient remains clinically and systemically, without sepsis or systemic toxicity.  OR culture is growing Enterobacter that is resistant to ertapenem.  - Continue IV meropenem  - Wound care per vascular surgery  -Treat x 7-day effective antibiotic course, through 6/2  -Transition to p.o. doxycycline at discharge  S/P bilateral above knee amputation (HCC)  In setting of PAD.  DM (diabetes mellitus) type II, controlled, with peripheral vascular disorder (HCC)  Lab Results   Component Value Date    HGBA1C 9.1 (A) 03/19/2025   Significant risk factor for poor wound healing and infection.  -Recommend tight glycemic control per primary team  -Patient will need close follow-up with PCP and/endocrinology for better diabetic control long-term    Discussed with patient in detail regarding the above plan.  I have discussed with Dr. Dietz from primary service regarding the above plan to current antibiotic.  She agrees  with the plan.    Antibiotics:  Meropenem # 2  POD # 4    Subjective   Patient feels well.  No pain in right AKA stump.  Temperature stays down.  No chills.  She is tolerating antibiotic well.  No nausea, vomiting or diarrhea.    Objective :  Temp:  [97.5 °F (36.4 °C)-98.1 °F (36.7 °C)] 98.1 °F (36.7 °C)  HR:  [62-72] 72  BP: (118-146)/(43-60) 120/57  Resp:  [16-18] 18  SpO2:  [88 %-96 %] 95 %  O2 Device: None (Room air)    Physical Exam:     General: Awake, alert, cooperative, no distress.   Neck:  Supple. No mass.  No lymphadenopathy.   Lungs: Expansion symmetric, no rales, no wheezing, respirations unlabored.   Heart:  Regular rate and rhythm, S1 and S2 normal, no murmur.   Abdomen: Soft, nondistended, non-tender, bowel sounds active all four quadrants, no masses, no organomegaly.   Extremities: Right AKA stump incision healing well.  Small area of wound packed, with no purulence.  Resolved erythema/warmth.  Minimal tenderness.  No fluctuance.   Skin:  No rash.   Neuro: Moves all extremities.        Lab Results: I have reviewed the following results:  Results from last 7 days   Lab Units 05/27/25  0620 05/26/25  0514 05/24/25  0435   WBC Thousand/uL 11.92* 12.95* 12.16*   HEMOGLOBIN g/dL 10.2* 10.5* 10.7*   PLATELETS Thousands/uL 260 303 288     Results from last 7 days   Lab Units 05/27/25  0620 05/26/25  0514 05/24/25  0435 05/23/25  0525 05/22/25  1236   SODIUM mmol/L 137 138 139 139 136   POTASSIUM mmol/L 4.2 4.3 3.8 4.0 4.9   CHLORIDE mmol/L 105 105 106 106 103   CO2 mmol/L 24 26 24 24 25   BUN mg/dL 27* 27* 27* 28* 24   CREATININE mg/dL 1.54* 1.50* 1.54* 1.54* 1.18   EGFR ml/min/1.73sq m 35 36 35 35 49   CALCIUM mg/dL 8.6 9.1 8.7 8.9 9.0   AST U/L  --   --   --  23 30   ALT U/L  --   --   --  32 36   ALK PHOS U/L  --   --   --  69 74   ALBUMIN g/dL  --   --   --  3.5 3.5     Results from last 7 days   Lab Units 05/23/25  1510 05/22/25  1641 05/22/25  1346 05/22/25  1236   BLOOD CULTURE   --   --  No Growth  After 4 Days. No Growth After 4 Days.   GRAM STAIN RESULT  1+ Disintegrating polys  No bacteria seen  --   --  1+ Polys  No bacteria seen   WOUND CULTURE  1+ Growth of Enterobacter cloacae*  --   --  2+ Growth of Enterobacter cloacae*   MRSA CULTURE ONLY   --  No Methicillin Resistant Staphlyococcus aureus (MRSA) isolated  --   --

## 2025-05-27 NOTE — PROGRESS NOTES
Progress Note - Vascular Surgery   Name: Dianna Varghese 64 y.o. female I MRN: [id number]  Unit/Bed#: E5 -01 I Date of Admission: 5/22/2025   Date of Service: 5/27/2025 I Hospital Day: 5    Assessment & Plan  Complication of amputated stump (HCC)  63 yo female ex-smoker w/ a hx of HTN, HLD, DM II (A1c 9.1), hyperparathyroidism, hypothyroidism, CVA (eliquis), CKD 3, and PAD S/P B/L AKA presented to [facility] from [facility] w/ R AKA stump wound.     Vascular surgery consulted for evaluation of R AKA stump wound.    Pt is S/P washout and debridement of R AKA wound on 5/23 (POD #4).    Diagnostics:  -5/22/25 Right femur xray: S/P AKA without radiographic evidence of osteomyelitis at this time.   -5/22/25 CT pelvis: Thick-walled enhancing tract extending from the anterior medial aspect of RLE stump wound, into the distal right femoral stump. There is also a lucent tortuous channel extending retrograde within the right femur. This is consistent with interval worsening/progression of the chronic pyogenic osteomyelitis.    Plan:  -Non-healing R AKA stump wound  -S/P washout and debridement of R AKA wound on 5/23 (POD #4)  -Intra-op cultures (+) enterobacter  -ID following w/ plan to continue 7-day ABX course w/ meropenem and change to oral doxycycline on discharge; input appreciated  -Continue daily dressing changes by nursing  -Continue ASA and lipitor  -Continue eliquis for hx of CVA  -Strict blood glucose control for optimal wound healing  -Continue medical management per primary team  -Will discuss w/ Dr. Silva    Subjective : Pt seen for exam while lying in bed; NAD. Pt denies any complaints at this time, and tolerates dressing change without issue.    Objective :  Temp:  [97.5 °F (36.4 °C)-98.1 °F (36.7 °C)] 98.1 °F (36.7 °C)  HR:  [62-72] 72  BP: (118-146)/(43-60) 120/57  Resp:  [16-18] 18  SpO2:  [88 %-96 %] 95 %  O2 Device: None (Room air)     I/O         05/25 0701 05/26 0700 05/26 0701 05/27 0700 05/27  0701  05/28 0700    P.O. 730 540 120    Total Intake(mL/kg) 730 (7.4) 540 (5.5) 120 (1.2)    Net +730 +540 +120           Unmeasured Urine Occurrence 4 x 3 x     Unmeasured Stool Occurrence 0 x 4 x             Physical Exam  Vitals and nursing note reviewed.   Constitutional:       General: She is awake. She is not in acute distress.     Appearance: She is well-developed.   HENT:      Head: Normocephalic and atraumatic.     Cardiovascular:      Rate and Rhythm: Normal rate and regular rhythm.      Heart sounds: Normal heart sounds.   Pulmonary:      Effort: Pulmonary effort is normal. No respiratory distress.   Abdominal:      General: There is no distension.     Musculoskeletal:         General: No swelling or tenderness.      Cervical back: Neck supple.      Right lower leg: No edema.      Left lower leg: No edema.      Amputation Right Lower Extremity: Right leg is amputated above knee.      Amputation Left Lower Extremity: Left leg is amputated above knee.     Skin:     General: Skin is warm and dry.      Capillary Refill: Capillary refill takes less than 2 seconds.      Findings: Wound present.      Comments: R AKA wound     Neurological:      General: No focal deficit present.      Mental Status: She is alert and oriented to person, place, and time.     Psychiatric:         Mood and Affect: Mood and affect normal.         Speech: Speech normal.         Behavior: Behavior normal. Behavior is cooperative.       Wound/Incision:  R AKA wound w/ moderate amount of serosanguinous drainage on old dressing. No active drainage noted during dressing change. Wound edges w/ no erythema, warmth. Area repacked w/ iodoform gauze and covered w/ 4x4 and ABD.      R AKA       Lab Results: I have reviewed the following results:  CBC with diff:   Lab Results   Component Value Date    WBC 11.92 (H) 05/27/2025    HGB 10.2 (L) 05/27/2025    HCT 32.6 (L) 05/27/2025    MCV 87 05/27/2025     05/27/2025    RBC 3.75 (L) 05/27/2025     MCH 27.2 05/27/2025    MCHC 31.3 (L) 05/27/2025    RDW 14.3 05/27/2025    MPV 9.4 05/27/2025    NRBC 0 05/27/2025   ,   BMP/CMP:  Lab Results   Component Value Date    SODIUM 137 05/27/2025    SODIUM 142 03/06/2023    K 4.2 05/27/2025    K 4.4 03/06/2023     05/27/2025     03/06/2023    CO2 24 05/27/2025    CO2 23 03/06/2023    BUN 27 (H) 05/27/2025    BUN 41 (H) 03/06/2023    CREATININE 1.54 (H) 05/27/2025    CREATININE 1.02 03/06/2023    CALCIUM 8.6 05/27/2025    CALCIUM 8.5 03/06/2023    AST 23 05/23/2025    AST 74 (H) 03/06/2023    ALT 32 05/23/2025     (H) 03/06/2023    ALKPHOS 69 05/23/2025    ALKPHOS 254 (H) 03/06/2023    EGFR 35 05/27/2025    EGFR 63 03/06/2023     Coags:   Lab Results   Component Value Date    PT 24.1 (H) 03/06/2023    PTT 35 (H) 05/22/2025    INR 1.40 (H) 05/23/2025    INR 2.2 03/06/2023     Blood Culture:   Lab Results   Component Value Date    BLOODCX No Growth After 4 Days. 05/22/2025     Wound Culure:   Lab Results   Component Value Date    WOUNDCULT 1+ Growth of Enterobacter cloacae (A) 05/23/2025

## 2025-05-27 NOTE — ASSESSMENT & PLAN NOTE
63 yo female ex-smoker w/ a hx of HTN, HLD, DM II (A1c 9.1), hyperparathyroidism, hypothyroidism, CVA (eliquis), CKD 3, and PAD S/P B/L AKA presented to [facility] from [facility] w/ R AKA stump wound.     Vascular surgery consulted for evaluation of R AKA stump wound.    Pt is S/P washout and debridement of R AKA wound on 5/23 (POD #4).    Diagnostics:  -5/22/25 Right femur xray: S/P AKA without radiographic evidence of osteomyelitis at this time.   -5/22/25 CT pelvis: Thick-walled enhancing tract extending from the anterior medial aspect of RLE stump wound, into the distal right femoral stump. There is also a lucent tortuous channel extending retrograde within the right femur. This is consistent with interval worsening/progression of the chronic pyogenic osteomyelitis.    Plan:  -Non-healing R AKA stump wound  -S/P washout and debridement of R AKA wound on 5/23 (POD #4)  -Intra-op cultures (+) enterobacter  -ID following w/ plan to continue 7-day ABX course w/ meropenem and change to oral doxycycline on discharge; input appreciated  -Continue daily dressing changes by nursing  -Continue ASA and lipitor  -Continue eliquis for hx of CVA  -Strict blood glucose control for optimal wound healing  -Continue medical management per primary team  -Will discuss w/ Dr. Silva

## 2025-05-27 NOTE — PLAN OF CARE
Problem: Potential for Falls  Goal: Patient will remain free of falls  Description: INTERVENTIONS:  - Educate patient/family on patient safety including physical limitations  - Instruct patient to call for assistance with activity   - Consider consulting OT/PT to assist with strengthening/mobility based on AM PAC & JH-HLM score  - Consult OT/PT to assist with strengthening/mobility   - Keep Call bell within reach  - Keep bed low and locked with side rails adjusted as appropriate  - Keep care items and personal belongings within reach  - Initiate and maintain comfort rounds  - Make Fall Risk Sign visible to staff  - Offer Toileting every 2 Hours, in advance of need  - Initiate/Maintain BED alarm  - Apply yellow socks and bracelet for high fall risk patients  - Consider moving patient to room near nurses station  Outcome: Progressing     Problem: Prexisting or High Potential for Compromised Skin Integrity  Goal: Skin integrity is maintained or improved  Description: INTERVENTIONS:  - Identify patients at risk for skin breakdown  - Assess and monitor skin integrity including under and around medical devices   - Assess and monitor nutrition and hydration status  - Monitor labs  - Assess for incontinence   - Turn and reposition patient  - Assist with mobility/ambulation  - Relieve pressure over lesly prominences   - Avoid friction and shearing  - Provide appropriate hygiene as needed including keeping skin clean and dry  - Evaluate need for skin moisturizer/barrier cream  - Collaborate with interdisciplinary team  - Patient/family teaching  - Consider wound care consult    Assess:  - Review [place] scale daily  - Inspect skin when repositioning, toileting, and assisting with ADLS  Outcome: Progressing     Problem: PAIN - ADULT  Goal: Verbalizes/displays adequate comfort level or baseline comfort level  Description: Interventions:  - Encourage patient to monitor pain and request assistance  - Assess pain using  appropriate pain scale  - Administer analgesics as ordered based on type and severity of pain and evaluate response  - Implement non-pharmacological measures as appropriate and evaluate response  - Consider cultural and social influences on pain and pain management  - Notify physician/advanced practitioner if interventions unsuccessful or patient reports new pain  - Educate patient/family on pain management process including their role and importance of  reporting pain   - Provide non-pharmacologic/complimentary pain relief interventions  Outcome: Progressing     Problem: INFECTION - ADULT  Goal: Absence or prevention of progression during hospitalization  Description: INTERVENTIONS:  - Assess and monitor for signs and symptoms of infection  - Monitor lab/diagnostic results  - Monitor all insertion sites, i.e. indwelling lines, tubes, and drains  - Monitor endotracheal if appropriate and nasal secretions for changes in amount and color  - [place] appropriate cooling/warming therapies per order  - Administer medications as ordered  - Instruct and encourage patient and family to use good hand hygiene technique  - Identify and instruct in appropriate isolation precautions for identified infection/condition  Outcome: Progressing  Goal: Absence of fever/infection during neutropenic period  Description: INTERVENTIONS:  - Monitor WBC  - Perform strict hand hygiene  - Limit to healthy visitors only  - No plants, dried, fresh or silk flowers with hopkins in patient room  Outcome: Progressing     Problem: SAFETY ADULT  Goal: Maintain or return to baseline ADL function  Description: INTERVENTIONS:  -  Assess patient's ability to carry out ADLs; assess patient's baseline for ADL function and identify physical deficits which impact ability to perform ADLs (bathing, care of mouth/teeth, toileting, grooming, dressing, etc.)  - Assess/evaluate cause of self-care deficits   - Assess range of motion  - Assess patient's mobility;  develop plan if impaired  - Assess patient's need for assistive devices and provide as appropriate  - Encourage maximum independence but intervene and supervise when necessary  - Involve family in performance of ADLs  - Assess for home care needs following discharge   - Consider OT consult to assist with ADL evaluation and planning for discharge  - Provide patient education as appropriate  - Monitor functional capacity and physical performance, use of AM PAC & JH-HLM   - Monitor gait, balance and fatigue with ambulation    Outcome: Progressing     Problem: DISCHARGE PLANNING  Goal: Discharge to home or other facility with appropriate resources  Description: INTERVENTIONS:  - Identify barriers to discharge w/patient and caregiver  - Arrange for needed discharge resources and transportation as appropriate  - Identify discharge learning needs (meds, wound care, etc.)  - Arrange for interpretive services to assist at discharge as needed  - Refer to Case Management Department for coordinating discharge planning if the patient needs post-hospital services based on physician/advanced practitioner order or complex needs related to functional status, cognitive ability, or social support system  Outcome: Progressing     Problem: Knowledge Deficit  Goal: Patient/family/caregiver demonstrates understanding of disease process, treatment plan, medications, and discharge instructions  Description: Complete learning assessment and assess knowledge base.  Interventions:  - Provide teaching at level of understanding  - Provide teaching via preferred learning methods  Outcome: Progressing

## 2025-05-28 VITALS
RESPIRATION RATE: 19 BRPM | SYSTOLIC BLOOD PRESSURE: 125 MMHG | TEMPERATURE: 98.7 F | WEIGHT: 216.71 LBS | DIASTOLIC BLOOD PRESSURE: 45 MMHG | BODY MASS INDEX: 38.39 KG/M2 | HEART RATE: 72 BPM | OXYGEN SATURATION: 95 %

## 2025-05-28 LAB
ANION GAP SERPL CALCULATED.3IONS-SCNC: 7 MMOL/L (ref 4–13)
BASOPHILS # BLD AUTO: 0.06 THOUSANDS/ÂΜL (ref 0–0.1)
BASOPHILS NFR BLD AUTO: 1 % (ref 0–1)
BUN SERPL-MCNC: 31 MG/DL (ref 5–25)
CALCIUM SERPL-MCNC: 8.7 MG/DL (ref 8.4–10.2)
CHLORIDE SERPL-SCNC: 105 MMOL/L (ref 96–108)
CO2 SERPL-SCNC: 24 MMOL/L (ref 21–32)
CREAT SERPL-MCNC: 1.39 MG/DL (ref 0.6–1.3)
EOSINOPHIL # BLD AUTO: 0.63 THOUSAND/ÂΜL (ref 0–0.61)
EOSINOPHIL NFR BLD AUTO: 5 % (ref 0–6)
ERYTHROCYTE [DISTWIDTH] IN BLOOD BY AUTOMATED COUNT: 14.2 % (ref 11.6–15.1)
GFR SERPL CREATININE-BSD FRML MDRD: 40 ML/MIN/1.73SQ M
GLUCOSE SERPL-MCNC: 141 MG/DL (ref 65–140)
GLUCOSE SERPL-MCNC: 162 MG/DL (ref 65–140)
GLUCOSE SERPL-MCNC: 183 MG/DL (ref 65–140)
HCT VFR BLD AUTO: 32.9 % (ref 34.8–46.1)
HGB BLD-MCNC: 10.4 G/DL (ref 11.5–15.4)
IMM GRANULOCYTES # BLD AUTO: 0.12 THOUSAND/UL (ref 0–0.2)
IMM GRANULOCYTES NFR BLD AUTO: 1 % (ref 0–2)
LYMPHOCYTES # BLD AUTO: 1.8 THOUSANDS/ÂΜL (ref 0.6–4.47)
LYMPHOCYTES NFR BLD AUTO: 15 % (ref 14–44)
MCH RBC QN AUTO: 27.6 PG (ref 26.8–34.3)
MCHC RBC AUTO-ENTMCNC: 31.6 G/DL (ref 31.4–37.4)
MCV RBC AUTO: 87 FL (ref 82–98)
MONOCYTES # BLD AUTO: 0.93 THOUSAND/ÂΜL (ref 0.17–1.22)
MONOCYTES NFR BLD AUTO: 8 % (ref 4–12)
NEUTROPHILS # BLD AUTO: 8.66 THOUSANDS/ÂΜL (ref 1.85–7.62)
NEUTS SEG NFR BLD AUTO: 70 % (ref 43–75)
NRBC BLD AUTO-RTO: 0 /100 WBCS
PLATELET # BLD AUTO: 278 THOUSANDS/UL (ref 149–390)
PMV BLD AUTO: 9.2 FL (ref 8.9–12.7)
POTASSIUM SERPL-SCNC: 4.1 MMOL/L (ref 3.5–5.3)
RBC # BLD AUTO: 3.77 MILLION/UL (ref 3.81–5.12)
SARS-COV-2 RNA RESP QL NAA+PROBE: NEGATIVE
SODIUM SERPL-SCNC: 136 MMOL/L (ref 135–147)
WBC # BLD AUTO: 12.2 THOUSAND/UL (ref 4.31–10.16)

## 2025-05-28 PROCEDURE — 87635 SARS-COV-2 COVID-19 AMP PRB: CPT | Performed by: INTERNAL MEDICINE

## 2025-05-28 PROCEDURE — 99232 SBSQ HOSP IP/OBS MODERATE 35: CPT | Performed by: INTERNAL MEDICINE

## 2025-05-28 PROCEDURE — 82948 REAGENT STRIP/BLOOD GLUCOSE: CPT

## 2025-05-28 PROCEDURE — NC001 PR NO CHARGE: Performed by: INTERNAL MEDICINE

## 2025-05-28 PROCEDURE — 85025 COMPLETE CBC W/AUTO DIFF WBC: CPT | Performed by: INTERNAL MEDICINE

## 2025-05-28 PROCEDURE — 80048 BASIC METABOLIC PNL TOTAL CA: CPT | Performed by: INTERNAL MEDICINE

## 2025-05-28 PROCEDURE — 99232 SBSQ HOSP IP/OBS MODERATE 35: CPT | Performed by: NURSE PRACTITIONER

## 2025-05-28 PROCEDURE — G0545 PR INHERENT VISIT TO INPT: HCPCS | Performed by: INTERNAL MEDICINE

## 2025-05-28 PROCEDURE — 99239 HOSP IP/OBS DSCHRG MGMT >30: CPT | Performed by: INTERNAL MEDICINE

## 2025-05-28 RX ORDER — GABAPENTIN 100 MG/1
200 CAPSULE ORAL 2 TIMES DAILY
Start: 2025-05-28

## 2025-05-28 RX ORDER — DOXYCYCLINE HYCLATE 100 MG/1
100 TABLET, DELAYED RELEASE ORAL 2 TIMES DAILY
Start: 2025-05-28 | End: 2025-06-02

## 2025-05-28 RX ORDER — NYSTATIN 100000 [USP'U]/G
POWDER TOPICAL 2 TIMES DAILY
Start: 2025-05-28

## 2025-05-28 RX ADMIN — Medication 250 MG: at 08:51

## 2025-05-28 RX ADMIN — SODIUM CHLORIDE 1000 MG: 9 INJECTION, SOLUTION INTRAVENOUS at 01:37

## 2025-05-28 RX ADMIN — HYDROCHLOROTHIAZIDE 25 MG: 25 TABLET ORAL at 08:54

## 2025-05-28 RX ADMIN — INSULIN LISPRO 1 UNITS: 100 INJECTION, SOLUTION INTRAVENOUS; SUBCUTANEOUS at 08:50

## 2025-05-28 RX ADMIN — METOPROLOL TARTRATE 50 MG: 50 TABLET, FILM COATED ORAL at 08:54

## 2025-05-28 RX ADMIN — INSULIN GLARGINE 50 UNITS: 100 INJECTION, SOLUTION SUBCUTANEOUS at 08:50

## 2025-05-28 RX ADMIN — SERTRALINE HYDROCHLORIDE 100 MG: 100 TABLET ORAL at 08:52

## 2025-05-28 RX ADMIN — LEVOTHYROXINE SODIUM 62.5 MCG: 0.12 TABLET ORAL at 05:55

## 2025-05-28 RX ADMIN — ATORVASTATIN CALCIUM 40 MG: 40 TABLET, FILM COATED ORAL at 08:52

## 2025-05-28 RX ADMIN — NYSTATIN: 100000 POWDER TOPICAL at 08:54

## 2025-05-28 RX ADMIN — FERROUS SULFATE TAB 325 MG (65 MG ELEMENTAL FE) 325 MG: 325 (65 FE) TAB at 11:46

## 2025-05-28 RX ADMIN — LISINOPRIL 40 MG: 20 TABLET ORAL at 08:54

## 2025-05-28 RX ADMIN — GABAPENTIN 200 MG: 100 CAPSULE ORAL at 08:51

## 2025-05-28 RX ADMIN — ASPIRIN 81 MG CHEWABLE TABLET 81 MG: 81 TABLET CHEWABLE at 08:52

## 2025-05-28 RX ADMIN — INSULIN LISPRO 1 UNITS: 100 INJECTION, SOLUTION INTRAVENOUS; SUBCUTANEOUS at 11:46

## 2025-05-28 RX ADMIN — AMLODIPINE BESYLATE 10 MG: 10 TABLET ORAL at 08:54

## 2025-05-28 RX ADMIN — APIXABAN 5 MG: 5 TABLET, FILM COATED ORAL at 08:52

## 2025-05-28 RX ADMIN — AMIODARONE HYDROCHLORIDE 100 MG: 100 TABLET ORAL at 08:52

## 2025-05-28 RX ADMIN — HYDRALAZINE HYDROCHLORIDE 100 MG: 50 TABLET ORAL at 05:57

## 2025-05-28 NOTE — DISCHARGE SUMMARY
Discharge Summary - Hospitalist   Name: Dianna Varghese 64 y.o. female I MRN: [id number]  Unit/Bed#: E5 -01 I Date of Admission: 5/22/2025   Date of Service: 5/28/2025 I Hospital Day: 6     Assessment & Plan  Complication of amputated stump (HCC)  64-year-old female with a history of hypertension, hyperlipidemia, type 2 diabetes mellitus, CKD 3, CVA, and PAD status post bilateral AKA who was sent in here from [facility] due to right AKA stump wound.  Per vascular surgery recommendations appreciated.    Status post washout by vascular team  .  ID on board  Currently on ertapenem  Sensitivities have since been obtained  Vascular followed up but no further washout was recommended  Will change to doxy at discharge through 6/2/25        PAD (peripheral artery disease) (HCC)  Status post left AKA 11/2022  Status post right AKA stump revision 1/2023  Continue aspirin, statin, hold eliquis for surgery (On Eliquis for PAD / stroke. Of note, patient had a prior history of HIT)  Continue Eliquis  Esophageal reflux  Continue as needed calcium carbonate.  Pantoprazole currently not in her medication list it appears that this has been recently discontinued as per documentation.  DM (diabetes mellitus) type II, controlled, with peripheral vascular disorder (HCC)  Lab Results   Component Value Date    HGBA1C 9.1 (A) 03/19/2025       Recent Labs     05/27/25  1542 05/27/25  2118 05/28/25  0717 05/28/25  1106   POCGLU 173* 183* 162* 183*       Blood Sugar Average: Last 72 hrs:  (P) [id number]  Home regimen is glargine 65 units at bedtime.  Semaglutide on hold. Decrease home glargine to 50U HS while inpatient and continue with sliding scale   Blood glucose controlled   Anxiety and depression  Continue sertraline and as needed Xanax  Hypertension  Bp controlled  Resume hydrochlorothiazide, amlodipine, hydralazine, metoprolol, lisinopril  Anemia  Stable.  No indication for transfusion at this time    Results from last 7  days   Lab Units 05/28/25  0533 05/27/25  0620 05/26/25  0514 05/24/25  0435 05/23/25  0525   HEMOGLOBIN g/dL 10.4* 10.2* 10.5* 10.7* 10.9*   MCV fL 87 87 87 84 84   RDW % 14.2 14.3 14.5 14.6 14.3       S/P bilateral above knee amputation (HCC)  Bilateral AKA, continue supportive care  Acquired hypothyroidism  Continue levothyroxine  IV infiltrate  Right arm infiltrate  Continue warm compress  Is almost resolved      Medical Problems       Resolved Problems  Date Reviewed: 5/28/2025   None       Discharging Physician / Practitioner: Mindi Dietz DO  PCP: Seb Chun DO  Admission Date:   Admission Orders (From admission, onward)       Ordered        05/22/25 1514  INPATIENT ADMISSION  Once                          Discharge Date: 05/28/25    Test Results Pending at Discharge (will require follow up):  none    Medication Changes for Discharge & Rationale:   Doxy 100mg bid until 6/2  Increase gabapentin to 200mg   See after visit summary for reconciled discharge medications provided to patient and/or family.     Consultations During Hospital Stay:  Vascular surgery  Infectious disease     Procedures Performed:   Washout and debridement     Significant Findings / Test Results:   CT pelvis w contrast  Result Date: 5/22/2025  Impression: 1.  There is a thick-walled enhancing tract extending from the anterior medial aspect of the right lower extremity stump wound, into the distal right femoral stump. There is also a lucent tortuous channel extending retrograde within the right femur. This  is consistent with interval worsening/progression of the chronic pyogenic osteomyelitis.    XR femur 2 views RIGHT  Result Date: 5/22/2025  Impression: Status post above-knee amputation without radiographic evidence of osteomyelitis at this time.     Incidental Findings:   none    Hospital Course:   Dianna Varghese is a 64 y.o. female patient who originally presented to the hospital on 5/22/2025 due to nonhealing right aka stump  wound. She was evaluated by vascular surgery as she had a drainage sinus tract. She is s/p washout and debridement on 5/23. Pt was evaluated by ID as culture grew enterobacter. She was treated with invanz. Pt was cleared for discharge by vascular surgery and has a follow up appt on 6/10/25. She was switched from invanz to doxy per id recommendations until 6/2/25     Please see above list of diagnoses and related plan for additional information.     Discharge Day Visit / Exam:   * Please refer to separate progress note for these details *    Discharge instructions/Information to patient and family:   See after visit summary for information provided to patient and family.      Provisions for Follow-Up Care:  See after visit summary for information related to follow-up care and any pertinent home health orders.      Mobility at time of Discharge:   Basic Mobility Inpatient Raw Score: 10  JH-HLM Goal: 4: Move to chair/commode  JH-HLM Achieved: 2: Bed activities/Dependent transfer        Planned Readmission: no    Administrative Statements   Discharge Statement:  I have spent a total time of 40 minutes in caring for this patient on the day of the visit/encounter.

## 2025-05-28 NOTE — CASE MANAGEMENT
Case Management Discharge Planning Note    Patient name Dianna Varghese  Location [facility] /E5 -* MRN [id number]  : 1960 Date 2025       Current Admission Date: 2025  Current Admission Diagnosis:Complication of amputated stump (HCC)   Patient Active Problem List    Diagnosis Date Noted    IV infiltrate 2025    Complication of amputated stump (HCC) 2025    Soft tissue infection 2025    Chronic renal insufficiency, stage 3 (moderate) ([facility]) 2025    Primary open angle glaucoma (POAG) of left eye, mild stage 2025    Polypharmacy 2025    Phantom pain after amputation of lower extremity ([facility]) 2024    Leukemoid reaction 2024    History of cardiac arrest 2024    Advance care planning 2024    Acquired hypothyroidism 2024    S/P bilateral above knee amputation ([facility]) 2023    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 ([facility]) 2023    Prolonged Q-T interval on ECG 2023    Hypomagnesemia 2023    Mild protein-calorie malnutrition ([facility]) 2022    HIT (heparin-induced thrombocytopenia) ([facility]) 2022    Anemia 10/30/2022    PAD (peripheral artery disease) ([facility]) 10/25/2022    Hyperparathyroidism ([facility]) 10/18/2021    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 ([facility]) 2021    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    Depression, recurrent ([facility]) 2018    Esophageal reflux 2018    DM (diabetes mellitus) type II, controlled, with peripheral vascular disorder ([facility]) 2018    Diabetic peripheral neuropathy ([facility]) 2017    History of CVA (cerebrovascular accident) 2015    Anxiety and depression 2015    Vitamin D deficiency 2015    Hypertension 2012    Familial combined hyperlipidemia 2012      LOS (days): 6  Geometric Mean LOS (GMLOS) (days): 3.3  Days to GMLOS:-2.6     OBJECTIVE:  Risk of  Unplanned Readmission Score: 14.82      Current admission status: Inpatient   Preferred Pharmacy:   [facility] [place] - [address]  [address] [address]  Phone: [phone] Fax: [phone]    Primary Care Provider: Seb Chun DO    Primary Insurance: [facility]  Secondary Insurance:     DISCHARGE DETAILS:    Discharge planning discussed with:: pt  Freedom of Choice: Yes     CM contacted family/caregiver?: No- see comments (pt states she is in contact with her dtr)  Were Treatment Team discharge recommendations reviewed with patient/caregiver?: Yes  Did patient/caregiver verbalize understanding of patient care needs?: Yes  Were patient/caregiver advised of the risks associated with not following Treatment Team discharge recommendations?: Yes    Contacts  Patient Contacts: Jailyn Gonzales (dtr)  Relationship to Patient:: Family  Contact Method: Phone  Phone Number: [phone]  Reason/Outcome: Emergency Contact, Discharge Planning, Referral    Requested Home Health Care         Is the patient interested in C at discharge?: No    DME Referral Provided  Referral made for DME?: No    Other Referral/Resources/Interventions Provided:  Interventions: Facility Return, SNF    Treatment Team Recommendation: Facility Return  Discharge Destination Plan:: Facility Return  Transport at Discharge : [facility]     Number/Name of Dispatcher: [employer]  Transported by (Company and Unit #): tbd  ETA of Transport (Date): 05/28/25  ETA of Transport (Time): 1400    Additional Comments: Pt to return to [facility] today. The [facility] Vehicle requested for Dianna DYLLAN in unit/room E5- 552 on 05/28/2025 is scheduled to arrive at 2:00pm. [facility] is handling this ride and you can contact them at [phone]. Ride was set up on [employer]. Medical Necessity given to bedside RN. Report can be called to [phone] and fax  is [phone]. Bedside RN, pt, SLIM, and SNF are aware of transport time.

## 2025-05-28 NOTE — PLAN OF CARE
Problem: Potential for Falls  Goal: Patient will remain free of falls  Description: INTERVENTIONS:  - Educate patient/family on patient safety including physical limitations  - Instruct patient to call for assistance with activity   - Consider consulting OT/PT to assist with strengthening/mobility based on AM PAC & JH-HLM score  - Consult OT/PT to assist with strengthening/mobility   - Keep Call bell within reach  - Keep bed low and locked with side rails adjusted as appropriate  - Keep care items and personal belongings within reach  - Initiate and maintain comfort rounds  - Make Fall Risk Sign visible to staff  - Offer Toileting every 2 Hours, in advance of need  - Initiate/Maintain BED alarm  - Apply yellow socks and bracelet for high fall risk patients  - Consider moving patient to room near nurses station  Outcome: Progressing     Problem: Prexisting or High Potential for Compromised Skin Integrity  Goal: Skin integrity is maintained or improved  Description: INTERVENTIONS:  - Identify patients at risk for skin breakdown  - Assess and monitor skin integrity including under and around medical devices   - Assess and monitor nutrition and hydration status  - Monitor labs  - Assess for incontinence   - Turn and reposition patient  - Assist with mobility/ambulation  - Relieve pressure over lesly prominences   - Avoid friction and shearing  - Provide appropriate hygiene as needed including keeping skin clean and dry  - Evaluate need for skin moisturizer/barrier cream  - Collaborate with interdisciplinary team  - Patient/family teaching  - Consider wound care consult    Assess:  - Review Nicanor scale daily  - Inspect skin when repositioning, toileting, and assisting with ADLS  Outcome: Progressing     Problem: PAIN - ADULT  Goal: Verbalizes/displays adequate comfort level or baseline comfort level  Description: Interventions:  - Encourage patient to monitor pain and request assistance  - Assess pain using  appropriate pain scale  - Administer analgesics as ordered based on type and severity of pain and evaluate response  - Implement non-pharmacological measures as appropriate and evaluate response  - Consider cultural and social influences on pain and pain management  - Notify physician/advanced practitioner if interventions unsuccessful or patient reports new pain  - Educate patient/family on pain management process including their role and importance of  reporting pain   - Provide non-pharmacologic/complimentary pain relief interventions  Outcome: Progressing     Problem: INFECTION - ADULT  Goal: Absence or prevention of progression during hospitalization  Description: INTERVENTIONS:  - Assess and monitor for signs and symptoms of infection  - Monitor lab/diagnostic results  - Monitor all insertion sites, i.e. indwelling lines, tubes, and drains  - Monitor endotracheal if appropriate and nasal secretions for changes in amount and color  - [place] appropriate cooling/warming therapies per order  - Administer medications as ordered  - Instruct and encourage patient and family to use good hand hygiene technique  - Identify and instruct in appropriate isolation precautions for identified infection/condition  Outcome: Progressing  Goal: Absence of fever/infection during neutropenic period  Description: INTERVENTIONS:  - Monitor WBC  - Perform strict hand hygiene  - Limit to healthy visitors only  - No plants, dried, fresh or silk flowers with hopkins in patient room  Outcome: Progressing     Problem: SAFETY ADULT  Goal: Maintain or return to baseline ADL function  Description: INTERVENTIONS:  -  Assess patient's ability to carry out ADLs; assess patient's baseline for ADL function and identify physical deficits which impact ability to perform ADLs (bathing, care of mouth/teeth, toileting, grooming, dressing, etc.)  - Assess/evaluate cause of self-care deficits   - Assess range of motion  - Assess patient's mobility;  develop plan if impaired  - Assess patient's need for assistive devices and provide as appropriate  - Encourage maximum independence but intervene and supervise when necessary  - Involve family in performance of ADLs  - Assess for home care needs following discharge   - Consider OT consult to assist with ADL evaluation and planning for discharge  - Provide patient education as appropriate  - Monitor functional capacity and physical performance, use of AM PAC & JH-HLM   - Monitor gait, balance and fatigue with ambulation    Outcome: Progressing     Problem: DISCHARGE PLANNING  Goal: Discharge to home or other facility with appropriate resources  Description: INTERVENTIONS:  - Identify barriers to discharge w/patient and caregiver  - Arrange for needed discharge resources and transportation as appropriate  - Identify discharge learning needs (meds, wound care, etc.)  - Arrange for interpretive services to assist at discharge as needed  - Refer to Case Management Department for coordinating discharge planning if the patient needs post-hospital services based on physician/advanced practitioner order or complex needs related to functional status, cognitive ability, or social support system  Outcome: Progressing     Problem: Knowledge Deficit  Goal: Patient/family/caregiver demonstrates understanding of disease process, treatment plan, medications, and discharge instructions  Description: Complete learning assessment and assess knowledge base.  Interventions:  - Provide teaching at level of understanding  - Provide teaching via preferred learning methods  Outcome: Progressing

## 2025-05-28 NOTE — ASSESSMENT & PLAN NOTE
64-year-old female with a history of hypertension, hyperlipidemia, type 2 diabetes mellitus, CKD 3, CVA, and PAD status post bilateral AKA who was sent in here from [facility] due to right AKA stump wound.  Per vascular surgery recommendations appreciated.    Status post washout by vascular team  .  ID on board  Currently on ertapenem  Sensitivities have since been obtained  Vascular followed up but no further washout was recommended  Will change to doxy at discharge through 6/2/25

## 2025-05-28 NOTE — ASSESSMENT & PLAN NOTE
Stable.  No indication for transfusion at this time    Results from last 7 days   Lab Units 05/28/25  0533 05/27/25  0620 05/26/25  0514 05/24/25  0435 05/23/25  0525   HEMOGLOBIN g/dL 10.4* 10.2* 10.5* 10.7* 10.9*   MCV fL 87 87 87 84 84   RDW % 14.2 14.3 14.5 14.6 14.3

## 2025-05-28 NOTE — PLAN OF CARE
Problem: Potential for Falls  Goal: Patient will remain free of falls  Description: INTERVENTIONS:  - Educate patient/family on patient safety including physical limitations  - Instruct patient to call for assistance with activity   - Consider consulting OT/PT to assist with strengthening/mobility based on AM PAC & JH-HLM score  - Consult OT/PT to assist with strengthening/mobility   - Keep Call bell within reach  - Keep bed low and locked with side rails adjusted as appropriate  - Keep care items and personal belongings within reach  - Initiate and maintain comfort rounds  - Make Fall Risk Sign visible to staff  - Offer Toileting every 2 Hours, in advance of need  - Initiate/Maintain BED alarm  - Apply yellow socks and bracelet for high fall risk patients  - Consider moving patient to room near nurses station  Outcome: Progressing     Problem: Prexisting or High Potential for Compromised Skin Integrity  Goal: Skin integrity is maintained or improved  Description: INTERVENTIONS:  - Identify patients at risk for skin breakdown  - Assess and monitor skin integrity including under and around medical devices   - Assess and monitor nutrition and hydration status  - Monitor labs  - Assess for incontinence   - Turn and reposition patient  - Assist with mobility/ambulation  - Relieve pressure over lesly prominences   - Avoid friction and shearing  - Provide appropriate hygiene as needed including keeping skin clean and dry  - Evaluate need for skin moisturizer/barrier cream  - Collaborate with interdisciplinary team  - Patient/family teaching  - Consider wound care consult    Assess:  - Review Nicanor scale daily  - Inspect skin when repositioning, toileting, and assisting with ADLS  Outcome: Progressing     Problem: PAIN - ADULT  Goal: Verbalizes/displays adequate comfort level or baseline comfort level  Description: Interventions:  - Encourage patient to monitor pain and request assistance  - Assess pain using  appropriate pain scale  - Administer analgesics as ordered based on type and severity of pain and evaluate response  - Implement non-pharmacological measures as appropriate and evaluate response  - Consider cultural and social influences on pain and pain management  - Notify physician/advanced practitioner if interventions unsuccessful or patient reports new pain  - Educate patient/family on pain management process including their role and importance of  reporting pain   - Provide non-pharmacologic/complimentary pain relief interventions  Outcome: Progressing     Problem: INFECTION - ADULT  Goal: Absence or prevention of progression during hospitalization  Description: INTERVENTIONS:  - Assess and monitor for signs and symptoms of infection  - Monitor lab/diagnostic results  - Monitor all insertion sites, i.e. indwelling lines, tubes, and drains  - Monitor endotracheal if appropriate and nasal secretions for changes in amount and color  - [place] appropriate cooling/warming therapies per order  - Administer medications as ordered  - Instruct and encourage patient and family to use good hand hygiene technique  - Identify and instruct in appropriate isolation precautions for identified infection/condition  Outcome: Progressing  Goal: Absence of fever/infection during neutropenic period  Description: INTERVENTIONS:  - Monitor WBC  - Perform strict hand hygiene  - Limit to healthy visitors only  - No plants, dried, fresh or silk flowers with hopkins in patient room  Outcome: Progressing     Problem: SAFETY ADULT  Goal: Maintain or return to baseline ADL function  Description: INTERVENTIONS:  -  Assess patient's ability to carry out ADLs; assess patient's baseline for ADL function and identify physical deficits which impact ability to perform ADLs (bathing, care of mouth/teeth, toileting, grooming, dressing, etc.)  - Assess/evaluate cause of self-care deficits   - Assess range of motion  - Assess patient's mobility;  develop plan if impaired  - Assess patient's need for assistive devices and provide as appropriate  - Encourage maximum independence but intervene and supervise when necessary  - Involve family in performance of ADLs  - Assess for home care needs following discharge   - Consider OT consult to assist with ADL evaluation and planning for discharge  - Provide patient education as appropriate  - Monitor functional capacity and physical performance, use of AM PAC & JH-HLM   - Monitor gait, balance and fatigue with ambulation    Outcome: Progressing     Problem: DISCHARGE PLANNING  Goal: Discharge to home or other facility with appropriate resources  Description: INTERVENTIONS:  - Identify barriers to discharge w/patient and caregiver  - Arrange for needed discharge resources and transportation as appropriate  - Identify discharge learning needs (meds, wound care, etc.)  - Arrange for interpretive services to assist at discharge as needed  - Refer to Case Management Department for coordinating discharge planning if the patient needs post-hospital services based on physician/advanced practitioner order or complex needs related to functional status, cognitive ability, or social support system  Outcome: Progressing     Problem: Knowledge Deficit  Goal: Patient/family/caregiver demonstrates understanding of disease process, treatment plan, medications, and discharge instructions  Description: Complete learning assessment and assess knowledge base.  Interventions:  - Provide teaching at level of understanding  - Provide teaching via preferred learning methods  Outcome: Progressing

## 2025-05-28 NOTE — PROGRESS NOTES
Report called to Sandor Castro. All questions answered. IV & Sander removed. Pt left with all belongings via transport.

## 2025-05-28 NOTE — PROGRESS NOTES
Progress Note - Infectious Disease   Name: Dianna Varghese 64 y.o. female I MRN: [id number]  Unit/Bed#: E5 -01 I Date of Admission: 5/22/2025   Date of Service: 5/28/2025 I Hospital Day: 6    Assessment & Plan  Complication of amputated stump (HCC)  Patient presenting with draining sinus tract from right AKA site.  CT showing a thick-walled enhancing tract extending from anterior medial aspect of right lower extremity stump wound into distal right femoral stump. On exam there is a sinus tract with mild purulent drainage noted but no current radiographic evidence of abscess and patient has no cellulitis noted on exam.  She had a mild leukocytosis, though has down-trended and she is afebrile.  Patient is status post I&D of AKA stump on 5/23.  At surgery, there was no bone encountered.  Therefore, osteomyelitis is unlikely.  Wound culture from 5/22 with 2+ GNR's.  On review of prior cultures patient has grown and MDR Enterobacter (1/17/23, 1/27/24), Enterococcus (1/2023), MSSA (1/24/23), and Candida albicans.  Patient remains clinically and systemically, without sepsis or systemic toxicity.  OR culture is growing Enterobacter that is resistant to ertapenem.  - Continue IV meropenem  - Wound care per vascular surgery  -Treat x 7-day effective antibiotic course, through 6/2  -Transition to p.o. doxycycline at discharge  S/P bilateral above knee amputation (HCC)  In setting of PAD.  DM (diabetes mellitus) type II, controlled, with peripheral vascular disorder (HCC)  Lab Results   Component Value Date    HGBA1C 9.1 (A) 03/19/2025   Significant risk factor for poor wound healing and infection.  -Recommend tight glycemic control per primary team  -Patient will need close follow-up with PCP and/endocrinology for better diabetic control long-term    Discussed with the patient in detail regarding the above plan.  Okay for discharge from ID viewpoint.  Plan for discharge today noted.      Antibiotics:  Meropenem # 3  POD #  5    Subjective   Patient feels well.  Minimal pain in right AKA stump  Temperature stays down.  No chills.  She is tolerating antibiotic well.  No nausea, vomiting or diarrhea.    Objective :  Temp:  [98.6 °F (37 °C)-98.8 °F (37.1 °C)] 98.7 °F (37.1 °C)  HR:  [62-72] 72  BP: (110-149)/(41-81) 125/45  Resp:  [19] 19  SpO2:  [91 %-96 %] 95 %  O2 Device: None (Room air)    Physical Exam:     General: Awake, alert, cooperative, no distress.   Neck:  Supple. No mass.  No lymphadenopathy.   Lungs: Expansion symmetric, no rales, no wheezing, respirations unlabored.   Heart:  Regular rate and rhythm, S1 and S2 normal, no murmur.   Abdomen: Soft, nondistended, non-tender, bowel sounds active all four quadrants, no masses, no organomegaly.   Extremities: Stable edema.  Right AKA wound healing well, with small area of packing.  No purulence.  No erythema/warmth.  Minimal tenderness.   Skin:  No rash.   Neuro: Moves all extremities.        Lab Results: I have reviewed the following results:  Results from last 7 days   Lab Units 05/28/25  0533 05/27/25  0620 05/26/25  0514   WBC Thousand/uL 12.20* 11.92* 12.95*   HEMOGLOBIN g/dL 10.4* 10.2* 10.5*   PLATELETS Thousands/uL 278 260 303     Results from last 7 days   Lab Units 05/28/25  0533 05/27/25  0620 05/26/25  0514 05/24/25  0435 05/23/25  0525 05/22/25  1236   SODIUM mmol/L 136 137 138   < > 139 136   POTASSIUM mmol/L 4.1 4.2 4.3   < > 4.0 4.9   CHLORIDE mmol/L 105 105 105   < > 106 103   CO2 mmol/L 24 24 26   < > 24 25   BUN mg/dL 31* 27* 27*   < > 28* 24   CREATININE mg/dL 1.39* 1.54* 1.50*   < > 1.54* 1.18   EGFR ml/min/1.73sq m 40 35 36   < > 35 49   CALCIUM mg/dL 8.7 8.6 9.1   < > 8.9 9.0   AST U/L  --   --   --   --  23 30   ALT U/L  --   --   --   --  32 36   ALK PHOS U/L  --   --   --   --  69 74   ALBUMIN g/dL  --   --   --   --  3.5 3.5    < > = values in this interval not displayed.     Results from last 7 days   Lab Units 05/23/25  7690 05/22/25  4055  05/22/25  1346 05/22/25  1236   BLOOD CULTURE   --   --  No Growth After 5 Days. No Growth After 5 Days.   GRAM STAIN RESULT  1+ Disintegrating polys  No bacteria seen  --   --  1+ Polys  No bacteria seen   WOUND CULTURE  1+ Growth of Enterobacter cloacae*  --   --  2+ Growth of Enterobacter cloacae*   MRSA CULTURE ONLY   --  No Methicillin Resistant Staphlyococcus aureus (MRSA) isolated  --   --

## 2025-05-28 NOTE — ASSESSMENT & PLAN NOTE
Lab Results   Component Value Date    HGBA1C 9.1 (A) 03/19/2025       Recent Labs     05/27/25  1110 05/27/25  1542 05/27/25  2118 05/28/25  0717   POCGLU 274* 173* 183* 162*       Blood Sugar Average: Last 72 hrs:  (P) [id number]  Home regimen is glargine 65 units at bedtime.  Semaglutide on hold. Decrease home glargine to 50U HS while inpatient and continue with sliding scale   Blood glucose controlled

## 2025-05-28 NOTE — ASSESSMENT & PLAN NOTE
64-year-old female with a history of hypertension, hyperlipidemia, type 2 diabetes mellitus, CKD 3, CVA, and PAD status post bilateral AKA who was sent in here from [facility] due to right AKA stump wound.  Per vascular surgery recommendations appreciated.    Status post washout by vascular team  .  ID on board  Currently on ertapenem  Sensitivities have since been obtained  Vascular followed up but no further washout was recommended  Will change to doxy at discharge through 6/2/25

## 2025-05-28 NOTE — ASSESSMENT & PLAN NOTE
Lab Results   Component Value Date    HGBA1C 9.1 (A) 03/19/2025       Recent Labs     05/27/25  1542 05/27/25  2118 05/28/25  0717 05/28/25  1106   POCGLU 173* 183* 162* 183*       Blood Sugar Average: Last 72 hrs:  (P) [id number]  Home regimen is glargine 65 units at bedtime.  Semaglutide on hold. Decrease home glargine to 50U HS while inpatient and continue with sliding scale   Blood glucose controlled

## 2025-05-28 NOTE — PROGRESS NOTES
Progress Note - Hospitalist   Name: Dianna Varghese 64 y.o. female I MRN: [id number]  Unit/Bed#: E5 -01 I Date of Admission: 5/22/2025   Date of Service: 5/28/2025 I Hospital Day: 6    Assessment & Plan  Complication of amputated stump (HCC)  64-year-old female with a history of hypertension, hyperlipidemia, type 2 diabetes mellitus, CKD 3, CVA, and PAD status post bilateral AKA who was sent in here from [facility] due to right AKA stump wound.  Per vascular surgery recommendations appreciated.    Status post washout by vascular team  .  ID on board  Currently on ertapenem  Sensitivities have since been obtained  Vascular followed up but no further washout was recommended  Will change to doxy at discharge through 6/2/25        PAD (peripheral artery disease) (HCC)  Status post left AKA 11/2022  Status post right AKA stump revision 1/2023  Continue aspirin, statin, hold eliquis for surgery (On Eliquis for PAD / stroke. Of note, patient had a prior history of HIT)  Continue Eliquis  Esophageal reflux  Continue as needed calcium carbonate.  Pantoprazole currently not in her medication list it appears that this has been recently discontinued as per documentation.  DM (diabetes mellitus) type II, controlled, with peripheral vascular disorder (HCC)  Lab Results   Component Value Date    HGBA1C 9.1 (A) 03/19/2025       Recent Labs     05/27/25  1110 05/27/25  1542 05/27/25  2118 05/28/25  0717   POCGLU 274* 173* 183* 162*       Blood Sugar Average: Last 72 hrs:  (P) [id number]  Home regimen is glargine 65 units at bedtime.  Semaglutide on hold. Decrease home glargine to 50U HS while inpatient and continue with sliding scale   Blood glucose controlled   Anxiety and depression  Continue sertraline and as needed Xanax  Hypertension  Bp controlled  Resume hydrochlorothiazide, amlodipine, hydralazine, metoprolol, lisinopril  Anemia  Stable.  No indication for transfusion at this time    Results from last 7 days    Lab Units 05/28/25  0533 05/27/25  0620 05/26/25  0514 05/24/25  0435 05/23/25  0525   HEMOGLOBIN g/dL 10.4* 10.2* 10.5* 10.7* 10.9*   MCV fL 87 87 87 84 84   RDW % 14.2 14.3 14.5 14.6 14.3       S/P bilateral above knee amputation (HCC)  Bilateral AKA, continue supportive care  Acquired hypothyroidism  Continue levothyroxine  IV infiltrate  Right arm infiltrate  Continue warm compress  Is almost resolved     VTE Pharmacologic Prophylaxis:   eliquis     Mobility:   Basic Mobility Inpatient Raw Score: 10  -[facility] Goal: 4: Move to chair/commode  -[facility] Achieved: 2: Bed activities/Dependent transfer    Patient Centered Rounds: updated her nurse      Education and Discussions with Family / Patient: Patient declined call to .     Current Length of Stay: 6 day(s)  Current Patient Status: Inpatient   Certification Statement: awaiting discharge   Discharge Plan: medically cleared for discharge. Awaiting transportation to be set up     Code Status: Level 1 - Full Code    Subjective   Pt seen and examined. Her arm is better. Pt feels well. No other problems were noted. No cp no sob no n/v/d no abd pain     Objective :  Temp:  [98.6 °F (37 °C)-98.8 °F (37.1 °C)] 98.7 °F (37.1 °C)  HR:  [62-72] 72  BP: (110-149)/(41-81) 125/45  Resp:  [19] 19  SpO2:  [91 %-96 %] 95 %  O2 Device: None (Room air)    Body mass index is 38.39 kg/m².     Input and Output Summary (last 24 hours):     Intake/Output Summary (Last 24 hours) at 5/28/2025 0931  Last data filed at 5/27/2025 1804  Gross per 24 hour   Intake 420 ml   Output --   Net 420 ml       Physical Exam  Constitutional:       Appearance: Normal appearance.   HENT:      Head: Normocephalic and atraumatic.     Eyes:      Extraocular Movements: Extraocular movements intact.      Pupils: Pupils are equal, round, and reactive to light.       Cardiovascular:      Rate and Rhythm: Normal rate and regular rhythm.      Heart sounds: No murmur heard.     No friction rub. No  gallop.   Pulmonary:      Effort: Pulmonary effort is normal. No respiratory distress.      Breath sounds: Normal breath sounds. No wheezing, rhonchi or rales.   Abdominal:      General: There is no distension.      Palpations: Abdomen is soft.      Tenderness: There is no abdominal tenderness. There is no guarding or rebound.     Musculoskeletal:      Comments: Bilateral bka      Neurological:      Mental Status: She is alert and oriented to person, place, and time.       Lines/Drains:      Lab Results: I have reviewed the following results:   Results from last 7 days   Lab Units 05/28/25  0533   WBC Thousand/uL 12.20*   HEMOGLOBIN g/dL 10.4*   HEMATOCRIT % 32.9*   PLATELETS Thousands/uL 278   SEGS PCT % 70   LYMPHO PCT % 15   MONO PCT % 8   EOS PCT % 5     Results from last 7 days   Lab Units 05/28/25  0533 05/24/25  0435 05/23/25  0525   SODIUM mmol/L 136   < > 139   POTASSIUM mmol/L 4.1   < > 4.0   CHLORIDE mmol/L 105   < > 106   CO2 mmol/L 24   < > 24   BUN mg/dL 31*   < > 28*   CREATININE mg/dL 1.39*   < > 1.54*   ANION GAP mmol/L 7   < > 9   CALCIUM mg/dL 8.7   < > 8.9   ALBUMIN g/dL  --   --  3.5   TOTAL BILIRUBIN mg/dL  --   --  0.21   ALK PHOS U/L  --   --  69   ALT U/L  --   --  32   AST U/L  --   --  23   GLUCOSE RANDOM mg/dL 141*   < > 122    < > = values in this interval not displayed.     Results from last 7 days   Lab Units 05/23/25  1251   INR  1.40*     Results from last 7 days   Lab Units 05/28/25  0717 05/27/25  2118 05/27/25  1542 05/27/25  1110 05/27/25  0728 05/26/25  2028 05/26/25  1615 05/26/25  1122 05/26/25  0714 05/25/25  2106 05/25/25  1606 05/25/25  1057   POC GLUCOSE mg/dl 162* 183* 173* 274* 175* 181* 180* 180* 158* 138 175* 244*         Results from last 7 days   Lab Units 05/22/25  1346   LACTIC ACID mmol/L 0.9       Recent Cultures (last 7 days):   Results from last 7 days   Lab Units 05/23/25  1510 05/22/25  1346 05/22/25  1236   BLOOD CULTURE   --  No Growth After 5 Days. No  Growth After 5 Days.   GRAM STAIN RESULT  1+ Disintegrating polys  No bacteria seen  --  1+ Polys  No bacteria seen   WOUND CULTURE  1+ Growth of Enterobacter cloacae*  --  2+ Growth of Enterobacter cloacae*       Imaging Results Review: No pertinent imaging studies reviewed.  Other Study Results Review: No additional pertinent studies reviewed.    Last 24 Hours Medication List:     Current Facility-Administered Medications:     acetaminophen (TYLENOL) tablet 650 mg, Q4H PRN    ALPRAZolam (XANAX) tablet 0.25 mg, Daily PRN    amiodarone tablet 100 mg, Daily    amLODIPine (NORVASC) tablet 10 mg, Daily    apixaban (ELIQUIS) tablet 5 mg, BID    aspirin chewable tablet 81 mg, Daily    atorvastatin (LIPITOR) tablet 40 mg, Daily    calcium carbonate (TUMS) chewable tablet 1,000 mg, Q6H PRN    ferrous sulfate tablet 325 mg, Daily With Lunch    gabapentin (NEURONTIN) capsule 200 mg, BID    hydrALAZINE (APRESOLINE) tablet 100 mg, Q8H MICHELLE    hydroCHLOROthiazide tablet 25 mg, Daily    insulin glargine (LANTUS) subcutaneous injection 50 Units 0.5 mL, QAM    insulin lispro (HumALOG/ADMELOG) 100 units/mL subcutaneous injection 1-6 Units, 4x Daily (AC & HS) **AND** Fingerstick Glucose (POCT), 4x Daily AC and at bedtime    latanoprost (XALATAN) 0.005 % ophthalmic solution 1 drop, HS    levothyroxine tablet 62.5 mcg, Early Morning    lisinopril (ZESTRIL) tablet 40 mg, Daily    loratadine (CLARITIN) tablet 10 mg, HS PRN    meropenem (MERREM) 1,000 mg in sodium chloride 0.9 % 100 mL IVPB, Q12H, Last Rate: 1,000 mg (05/28/25 0137)    metoprolol tartrate (LOPRESSOR) tablet 50 mg, Q12H MICHELLE    nystatin (MYCOSTATIN) powder, BID    polyethylene glycol (MIRALAX) packet 17 g, Daily PRN    saccharomyces boulardii (FLORASTOR) capsule 250 mg, BID    sertraline (ZOLOFT) tablet 100 mg, Daily    Administrative Statements   Today, Patient Was Seen By: Mindi Dietz DO

## 2025-05-28 NOTE — ASSESSMENT & PLAN NOTE
63 yo female ex-smoker w/ a hx of HTN, HLD, DM II (A1c 9.1), hyperparathyroidism, hypothyroidism, CVA (eliquis), CKD 3, and PAD S/P B/L AKA presented to [facility] from [facility] w/ R AKA stump wound.     Vascular surgery consulted for evaluation of R AKA stump wound.    Pt is S/P washout and debridement of R AKA wound on 5/23 (POD #5).    Diagnostics:  -5/22/25 Right femur xray: S/P AKA without radiographic evidence of osteomyelitis at this time.   -5/22/25 CT pelvis: Thick-walled enhancing tract extending from the anterior medial aspect of RLE stump wound, into the distal right femoral stump. There is also a lucent tortuous channel extending retrograde within the right femur. This is consistent with interval worsening/progression of the chronic pyogenic osteomyelitis.    Plan:  -Non-healing R AKA stump wound  -S/P washout and debridement of R AKA wound on 5/23 (POD #5)  -Intra-op cultures (+) enterobacter  -ID following w/ plan to continue 7-day ABX course through 6/2; input appreciated  -Continue daily dressing changes by nursing  -Continue ASA and lipitor  -Continue eliquis for hx of CVA  -Strict blood glucose control for optimal wound healing  -Continue medical management per primary team  -Pt is stable for discharge from a vascular surgery standpoint  -Plan follow up outpt in the vascular surgery office; appt scheduled for 6/10/25  -Please contact vascular surgery for any further questions or concerns  -Will discuss w/ Dr. Tirado

## 2025-05-28 NOTE — PROGRESS NOTES
Progress Note - Vascular Surgery   Name: Dianna Varghese 64 y.o. female I MRN: [id number]  Unit/Bed#: E5 -01 I Date of Admission: 5/22/2025   Date of Service: 5/28/2025 I Hospital Day: 6    Assessment & Plan  Complication of amputated stump (HCC)  63 yo female ex-smoker w/ a hx of HTN, HLD, DM II (A1c 9.1), hyperparathyroidism, hypothyroidism, CVA (eliquis), CKD 3, and PAD S/P B/L AKA presented to [facility] from [facility] w/ R AKA stump wound.     Vascular surgery consulted for evaluation of R AKA stump wound.    Pt is S/P washout and debridement of R AKA wound on 5/23 (POD #5).    Diagnostics:  -5/22/25 Right femur xray: S/P AKA without radiographic evidence of osteomyelitis at this time.   -5/22/25 CT pelvis: Thick-walled enhancing tract extending from the anterior medial aspect of RLE stump wound, into the distal right femoral stump. There is also a lucent tortuous channel extending retrograde within the right femur. This is consistent with interval worsening/progression of the chronic pyogenic osteomyelitis.    Plan:  -Non-healing R AKA stump wound  -S/P washout and debridement of R AKA wound on 5/23 (POD #5)  -Intra-op cultures (+) enterobacter  -ID following w/ plan to continue 7-day ABX course through 6/2; input appreciated  -Continue daily dressing changes by nursing  -Continue ASA and lipitor  -Continue eliquis for hx of CVA  -Strict blood glucose control for optimal wound healing  -Continue medical management per primary team  -Pt is stable for discharge from a vascular surgery standpoint  -Plan follow up outpt in the vascular surgery office; appt scheduled for 6/10/25  -Please contact vascular surgery for any further questions or concerns  -Will discuss w/ Dr. Tirado    Subjective : Pt seen for exam while sitting upright in bed; NAD. Pt denies any complaints at this time.    Objective :  Temp:  [98.6 °F (37 °C)-98.8 °F (37.1 °C)] 98.7 °F (37.1 °C)  HR:  [62-72] 72  BP: (110-149)/(41-81) 125/45  Resp:   [19] 19  SpO2:  [91 %-96 %] 95 %  O2 Device: None (Room air)     I/O         05/26 0701 05/27 0700 05/27 0701 05/28 0700 05/28 0701 05/29 0700    P.O. 540 540     Total Intake(mL/kg) 540 (5.5) 540 (5.5)     Net +540 +540            Unmeasured Urine Occurrence 3 x  1 x    Unmeasured Stool Occurrence 4 x              Physical Exam  Vitals and nursing note reviewed.   Constitutional:       General: She is awake. She is not in acute distress.     Appearance: She is well-developed.   HENT:      Head: Normocephalic and atraumatic.     Cardiovascular:      Rate and Rhythm: Normal rate and regular rhythm.      Heart sounds: Normal heart sounds.   Pulmonary:      Effort: Pulmonary effort is normal. No respiratory distress.   Abdominal:      General: There is no distension.     Musculoskeletal:         General: No swelling or tenderness.      Cervical back: Neck supple.      Right lower leg: No edema.      Left lower leg: No edema.      Amputation Right Lower Extremity: Right leg is amputated above knee.      Amputation Left Lower Extremity: Left leg is amputated above knee.     Skin:     General: Skin is warm and dry.      Capillary Refill: Capillary refill takes less than 2 seconds.      Findings: Wound present.      Comments: R AKA wound     Neurological:      General: No focal deficit present.      Mental Status: She is alert and oriented to person, place, and time.     Psychiatric:         Mood and Affect: Mood and affect normal.         Speech: Speech normal.         Behavior: Behavior normal. Behavior is cooperative.         Cognition and Memory: Cognition normal.       Wound/Incision:  R AKA wound w/ scant amount of serosanguinous drainage on old dressing. No active drainage noted during dressing change. Wound edges w/ no erythema, warmth. Area repacked w/ iodoform gauze and covered w/ 4x4 and ABD.       R AKA      Lab Results: I have reviewed the following results:  CBC with diff:   Lab Results   Component Value  Date    WBC 12.20 (H) 05/28/2025    HGB 10.4 (L) 05/28/2025    HCT 32.9 (L) 05/28/2025    MCV 87 05/28/2025     05/28/2025    RBC 3.77 (L) 05/28/2025    MCH 27.6 05/28/2025    MCHC 31.6 05/28/2025    RDW 14.2 05/28/2025    MPV 9.2 05/28/2025    NRBC 0 05/28/2025     BMP/CMP:  Lab Results   Component Value Date    SODIUM 136 05/28/2025    SODIUM 142 03/06/2023    K 4.1 05/28/2025    K 4.4 03/06/2023     05/28/2025     03/06/2023    CO2 24 05/28/2025    CO2 23 03/06/2023    BUN 31 (H) 05/28/2025    BUN 41 (H) 03/06/2023    CREATININE 1.39 (H) 05/28/2025    CREATININE 1.02 03/06/2023    CALCIUM 8.7 05/28/2025    CALCIUM 8.5 03/06/2023    AST 23 05/23/2025    AST 74 (H) 03/06/2023    ALT 32 05/23/2025     (H) 03/06/2023    ALKPHOS 69 05/23/2025    ALKPHOS 254 (H) 03/06/2023    EGFR 40 05/28/2025    EGFR 63 03/06/2023     Coags:   Lab Results   Component Value Date    PT 24.1 (H) 03/06/2023    PTT 35 (H) 05/22/2025    INR 1.40 (H) 05/23/2025    INR 2.2 03/06/2023     Blood Culture:   Lab Results   Component Value Date    BLOODCX No Growth After 5 Days. 05/22/2025     Wound Culure:   Lab Results   Component Value Date    WOUNDCULT 1+ Growth of Enterobacter cloacae (A) 05/23/2025

## 2025-05-30 ENCOUNTER — TELEPHONE (OUTPATIENT)
Dept: VASCULAR SURGERY | Facility: CLINIC | Age: 65
End: 2025-05-30

## 2025-05-30 ENCOUNTER — NURSING HOME VISIT (OUTPATIENT)
Dept: GERIATRICS | Facility: OTHER | Age: 65
End: 2025-05-30

## 2025-05-30 DIAGNOSIS — E11.51 DM (DIABETES MELLITUS) TYPE II, CONTROLLED, WITH PERIPHERAL VASCULAR DISORDER (HCC): ICD-10-CM

## 2025-05-30 DIAGNOSIS — T87.9 COMPLICATION OF AMPUTATED STUMP (HCC): Primary | ICD-10-CM

## 2025-05-30 DIAGNOSIS — D72.823 LEUKEMOID REACTION: ICD-10-CM

## 2025-05-30 DIAGNOSIS — S78.119S: ICD-10-CM

## 2025-05-30 DIAGNOSIS — E03.9 ACQUIRED HYPOTHYROIDISM: ICD-10-CM

## 2025-05-30 DIAGNOSIS — Z91.89 AT RISK FOR DELIRIUM: ICD-10-CM

## 2025-05-30 DIAGNOSIS — Z86.74 HISTORY OF CARDIAC ARREST: ICD-10-CM

## 2025-05-30 DIAGNOSIS — Z86.73 HISTORY OF CVA (CEREBROVASCULAR ACCIDENT): ICD-10-CM

## 2025-05-30 DIAGNOSIS — R94.31 PROLONGED Q-T INTERVAL ON ECG: ICD-10-CM

## 2025-05-30 DIAGNOSIS — Z89.611 S/P BILATERAL ABOVE KNEE AMPUTATION (HCC): ICD-10-CM

## 2025-05-30 DIAGNOSIS — Z71.89 ADVANCE CARE PLANNING: ICD-10-CM

## 2025-05-30 DIAGNOSIS — G54.6 PHANTOM PAIN AFTER AMPUTATION OF LOWER EXTREMITY (HCC): ICD-10-CM

## 2025-05-30 DIAGNOSIS — E78.49 FAMILIAL COMBINED HYPERLIPIDEMIA: ICD-10-CM

## 2025-05-30 DIAGNOSIS — N18.30 CHRONIC RENAL INSUFFICIENCY, STAGE 3 (MODERATE) (HCC): ICD-10-CM

## 2025-05-30 DIAGNOSIS — D50.8 IRON DEFICIENCY ANEMIA SECONDARY TO INADEQUATE DIETARY IRON INTAKE: ICD-10-CM

## 2025-05-30 DIAGNOSIS — F41.9 ANXIETY AND DEPRESSION: ICD-10-CM

## 2025-05-30 DIAGNOSIS — K21.9 GASTROESOPHAGEAL REFLUX DISEASE, UNSPECIFIED WHETHER ESOPHAGITIS PRESENT: ICD-10-CM

## 2025-05-30 DIAGNOSIS — I10 PRIMARY HYPERTENSION: ICD-10-CM

## 2025-05-30 DIAGNOSIS — Z91.89 AT RISK FOR CONSTIPATION: ICD-10-CM

## 2025-05-30 DIAGNOSIS — F32.A ANXIETY AND DEPRESSION: ICD-10-CM

## 2025-05-30 DIAGNOSIS — E21.3 HYPERPARATHYROIDISM (HCC): ICD-10-CM

## 2025-05-30 DIAGNOSIS — R26.2 AMBULATORY DYSFUNCTION: ICD-10-CM

## 2025-05-30 DIAGNOSIS — Z89.612 S/P BILATERAL ABOVE KNEE AMPUTATION (HCC): ICD-10-CM

## 2025-05-30 DIAGNOSIS — I73.9 PAD (PERIPHERAL ARTERY DISEASE) (HCC): Chronic | ICD-10-CM

## 2025-05-30 PROBLEM — L08.9 SOFT TISSUE INFECTION: Status: RESOLVED | Noted: 2025-04-23 | Resolved: 2025-05-30

## 2025-05-30 PROBLEM — T80.1XXA IV INFILTRATE: Status: RESOLVED | Noted: 2025-05-27 | Resolved: 2025-05-30

## 2025-05-30 PROBLEM — E11.3391: Chronic | Status: RESOLVED | Noted: 2021-01-25 | Resolved: 2025-05-30

## 2025-05-30 NOTE — ASSESSMENT & PLAN NOTE
Recent issue with non-healing right lower extremity AKA stump wound  Recently hospitalized for this due to some concern of osteomyelitis  Was eval by Vascular and ID inpatient. Osteomyelitis was considered unlikely per ID.  Enterococcus on wound culture  s/p washout and debridement 5/23/25  treated with antibiotics including invanz switched to doxycycline through 6/2/25  Wound care consulted and following at facility. Have discussed wound care/packing instructions  Manage diabetes to help improve wound healing potential  Will consult Dietary to consider protein supplement/Glucerna to assist with wound healing  Continue to monitor  Follow up with Vascular as scheduled

## 2025-05-30 NOTE — TELEPHONE ENCOUNTER
Vascular Nurse Navigator Post Op Call    Procedure:  Right - WASHOUT AND DEBRIDEMENT OF RIGHT AKA     Date of Procedure:  5/23/25    Surgeon:  * Sridevi Isaac MD - Primary     Discharge Date:  5/28/25    Discharge Disposition: Rehab - [facility]    Chest Pain?:No    Shortness of Breath?:No     Increased Pain, Swelling, Warmth, or Redness?:No    Purulent Drainage?:No    Foul- smelling drainage?: No    Signs of dehiscence?:No    Fever/Chills?:No    Bleeding?:No    Anticoagulation pt was discharged on post op?: Aspirin and Apixaban (Eliquis)    Statin pt was discharged on post op?: Lipitor (atorvastatin)    Uncontrolled Pain?:No        Reviewed discharge instructions and incision care with patient.        NEXT OFFICE VISIT SCHEDULED:  6/10/25 at 10 am with ALEX Pérez at [facility]    Transportation: Yes      Any further questions/concerns?   Spoke with Dianna at [facility] in regards to above.  She stated that patient is doing good since discharge.  She stated that she did her dressing change this morning and noted thin yellow drainage and denies any signs of infection.  All questions answered.  No concerns expressed at this time.

## 2025-05-30 NOTE — ASSESSMENT & PLAN NOTE
Seems fairly mild, stable on lab review  -monitor on routine labs  -borderline microcytic and hx of low iron on lab review - continue ferrous sulfate  -likely component of CKD as well  -monitor for acute bleed - no present signs  -consider further workup, Heme consult if persistent/worsening  -transfuse PRN Hb <7  -low threshold to hold anticoagulation if evidence of bleed or worsening Hb

## 2025-05-30 NOTE — ASSESSMENT & PLAN NOTE
Lab Results   Component Value Date    HGBA1C 9.1 (A) 03/19/2025     Recent A1c 9.1 worsened  Encourage CCD  Monitor glucose routinely - recent fasting sugars have generally been mid-high 100s, not yet well controlled but overall better than before  Avoid hypoglycemia  Encourage lifestyle modifications including healthy diet, weight management, exercise as appropriate  Following Endocrine outpatient. Next appt June 2025  Follow up Ophthalmology/Podiatry (at facility) as appropriate  Continue Lantus, will increase from 60 to 65u daily, titrate further as appropriate. Consider splitting to BID dosing with further increased  Recently changed from Trulicity to Ozempic per Endocrine. Has been titrated up to 1mg weekly, appears to be tolerating well  One of her goals has been to reduce pill burden therefore preferring generally not to add further meds but to titrate existing ones  Follow up HgbA1c Q3 months July/Oct/Jan/April

## 2025-05-30 NOTE — ASSESSMENT & PLAN NOTE
At risk due to hospitalization, relative immobility, comorbidities  Monitor stool output  Bowel regimen at facility: as per protocol, adjust as appropriate; consider bisacodyl suppository PRN if no BM in 2-3 days  Encourage mobility as tolerated, PO hydration as appropriate, high fiber diet/prune juice (in outpatient setting as appropriate)  Goal is for 1 easy BM every 1-2 days

## 2025-05-30 NOTE — ASSESSMENT & PLAN NOTE
Monitor BP - generally stable, somewhat variable, around 120s-140s systolic, at times lower or higher  Pulse generally 60s-80s  Avoid hypotension  No acute cardiac complaints  Continue regimen including amlodipine 10mg daily, lisinopril 40mg daily, metoprolol tartrate 50mg BID, HCTZ 12.5mg daily, hydralazine 100mg TID, amiodarone 100mg daily  Consider weaning/adjusting extensive regimen as appropriate, especially if BP remains well controlled or becomes softer. For now seems stable on current regimen  Following Cardiology outpatient

## 2025-05-30 NOTE — PROGRESS NOTES
[facility]  Facility: [facility]    HISTORY AND PHYSICAL  Nursing Home Place of Service: nursing home place of service: POS 32 Unskilled- No Part A Coverage    NAME: Dianna Varghese  : 1960 AGE: 64 y.o. SEX: female MRN: [id number]  DATE OF ENCOUNTER: 2025    Records Reviewed include: Hospital records    Chief Complaint/ Reason for Admission:   Re-admit to LTC after hospitalization for R leg wound/abscess    History of Present Illness:     Dianna Varghese is a 64 y.o. female with PMH including bilateral above knee amputations, cardiac arrest, type 2 DM, TIA, major depressive disorder, peripheral vascular disease, hypertension, hyperlipidemia, and GERD   For details of hospitalization, see hospital records including discharge documentation  Patient was hospitalized at [facility] from  to 25  Briefly, patient hospitalized due to draining/non-healing R stump wound; eval by Vascular Surgery and ID; s/p washout and debridement 25; treated with antibiotics including invanz switched to doxycycline through 25.    Patient seen and examined in room  Others present: none  Patient laying in bed with head elevated watching TV  Appears comfortable, awake, alert, oriented to situation, able to converse appropriately  Very polite, Aox3, in good spirits, appears to be a reasonable historian, mentation appearing similar to my prior visit. She notes she feels well, happy to be back, recalls her hospital course with good detail and is glad she had the workup/treatment done. No acute concerns presently. She is amenable to wound care plan.  No acute pain anywhere including at R stump wound recently. Endorses rare/intermittent bilateral leg phantom pains well controlled on present gabapentin dosing.   Endorses good stable appetite, no swallowing trouble, no abd pain/N/V, recently BM regular/normal/easy. Urinating well without acute issue. Breathing fine on room air, no orthopnea. No  "CP/palpitations; does have some chronic stable dyspnea on exertion which is unchanged. Denies orthostatic lightheadedness. Sleeping well. Endorses mood stable/good lately. She does not feel acutely sick or feverish or confused. No acute cardiopulmonary, abdominal, or urinary symptoms; see ROS for more details.          No further questions or acute concerns identified.  No further acute concerns per nursing.  Also discussed case with wound care RN Rizwan on unit.      Lab Review:  1/18/24: CBC with Hb 10.8; CMP with Cr 1.43, eGFR 41 (recent baseline seems around 40s-60s); TSH 8.20, FT4 0.6; A1c 7.7  3/6/24: CBC with Hb 10.8; BMP generally stable, Cr 1.41, eGFR 42; TSH 7.96  4/18/24: CBC with Hb 10.9; CMP with Cr 1.51, eGFR 39; A1c 9.3; TSH WNL  6/3/24: CBC with WBC 11.8, Hb 10.5  6/10/24: CBC with WBC 12.3, Hb 10.5  6/17/24: CBC with WBC 12.1, Hb 10.5  6/26/24: CBC with WBC 10.7, Hb 10.6; BMP generally stable/non-actionable, Cr 1.08, GFR 58; Mg 1.7  7/17/24: CBC with WBC 11.8, Hb 11.0; CMP generally stable/non-actionable, Cr 1.43, eGFR 41; A1c 9.7  8/14/24: BMP with Cr 1.21, GFR 50  8/28/24: CBC with WBC 11.9, Hb 11.3  10/16/24: CBC with WBC 11.2, Hb 11.6; CMP generally stable/non-actionable, Cr 0.99, eGFR 64; A1c 9.4  10/21/24: CBC with WBC 12.0, Hb 11.2, manual diff with elevated neutrophils  12/12/24: CBC with WBC 11.8, Hb 10.9  1/9/25: respiratory viral panel negative  1/15/25: CBC with WBC 13.6, Hb 10.7; CMP with Cr 1.25, GFR 48; A1c 8.3  1/28/25: CBC with WBC 10.7, Hb 10.8; CMP with Cr 1.21, GFR 50; ESR 46; CRP 19.7; iron 46; flow cytometry \"There is no immunophenotypic evidence of a clonal B or T-cell population or increase in CD34+ blasts.\"  2/14-19/25: TSH 11.55, fT4 WNL  4/1/25: TSH 7.38, fT4 WNL  4/16/25: CBC with WBC 13.7, Hb 10.5; CMP with Cr 1.18, GFR 52; A1c 9.1  4/23/25: R stump wound culture \"No Neutrophils noted.No organisms observed. CULTURE RESULTS: NO GROWTH\"  4/29/25: CBC with WBC 12.6, Hb " "10.7  5/28/25: BMP with Cr 1.39 (improved), GFR 40; CBC with WBC 12.2 (stable), Hb 10.4      Lab Results   Component Value Date    HGBA1C 9.1 (A) 03/19/2025     Lab Results   Component Value Date    JUG7DDNGIUKK 1.367 01/23/2023     Lab Results   Component Value Date    AZEEYRWW22 625 01/24/2023     Lab Results   Component Value Date    IRON 34 (L) 01/24/2023    TIBC 138 (L) 01/24/2023    FERRITIN 910 (H) 01/24/2023     Lab Results   Component Value Date    CHOLESTEROL 102 11/14/2022     Lab Results   Component Value Date    HDL 28 (L) 11/14/2022     Lab Results   Component Value Date    TRIG 677 (H) 01/21/2023     Lab Results   Component Value Date    NONHDLC 177 08/12/2021     Lab Results   Component Value Date    LDLCALC 55 11/14/2022           CT pelvis w contrast  Result Date: 5/22/2025  1.  There is a thick-walled enhancing tract extending from the anterior medial aspect of the right lower extremity stump wound, into the distal right femoral stump. There is also a lucent tortuous channel extending retrograde within the right femur. This  is consistent with interval worsening/progression of the chronic pyogenic osteomyelitis.     XR femur 2 views RIGHT  Result Date: 5/22/2025  Status post above-knee amputation without radiographic evidence of osteomyelitis at this time.    US right leg 5/21/25 \"FINDINGS: Soft tissue ultrasound. Region of concern: Right leg amputation site. There is a suggestion of a complex collection, 2.4 x 1.3 x 2.1 cm. Possibilities include hematoma and abscess.  CONCLUSION: Suggestion of a complex collection. Consider CT for further delineation.\"         EKG Jan 2023: NSR, 91bpm, Qtc 511  LEON Jan 2023: EF 60, \"No vegetations or other evidence of endocarditis seen \"          [place] reviewed 5/30/2025 11/07/2024 11/07/2024 1 Alprazolam 0.25 Mg Tablet 10.00 10 Va Ali [id number] Gra (3279) 0 0.50 LME Comm Ins [place]   04/03/2024 04/03/2024 1 Alprazolam 0.5 Mg Tablet 1.00 1 Va Ali [id number] Gra " (4290) 0 1.00 LME [place]         Social: Patient lives at LT facility. Baseline use of wheelchair due to bilateral leg amputation.    Allergies  Allergies[1]    Past Medical History  Past Medical History[2]     Past Surgical History[3]    Family History  Family History[4]    Social History  Tobacco Use History[5]   Social History     Substance and Sexual Activity   Alcohol Use Never    Comment: OCCASIONAL ALCOHOL USE IN ALL SCRIPTS      Social History     Substance and Sexual Activity   Drug Use No            Physical Exam    Vital Signs  There were no vitals filed for this visit.  BP: 154/53 mmHg  5/30/2025 00:38   Temp:97.3 °F  5/30/2025 00:38 Pulse:60 bpm  5/30/2025 10:50 Weight:206 Lbs  5/29/2025 22:37   Resp:20 Breaths/min  5/30/2025 00:38 BS:186 mg/dL  5/30/2025 12:40 O2:95 %  5/30/2025 00:38 Pain:0  5/30/2025 12:32         Physical Exam  Vitals reviewed.   Constitutional:       General: She is not in acute distress.     Appearance: She is obese. She is not toxic-appearing or diaphoretic.   HENT:      Head: Normocephalic and atraumatic.      Right Ear: External ear normal.      Left Ear: External ear normal.      Nose: Nose normal. No rhinorrhea.      Mouth/Throat:      Mouth: Mucous membranes are dry.      Pharynx: Oropharynx is clear. No posterior oropharyngeal erythema.     Eyes:      General: No scleral icterus.        Right eye: No discharge.         Left eye: No discharge.      Extraocular Movements: Extraocular movements intact.      Conjunctiva/sclera: Conjunctivae normal.      Pupils: Pupils are equal, round, and reactive to light.       Cardiovascular:      Rate and Rhythm: Normal rate and regular rhythm.   Pulmonary:      Effort: Pulmonary effort is normal. No respiratory distress.      Breath sounds: Normal breath sounds. No wheezing or rales.   Abdominal:      General: Bowel sounds are normal. There is no distension.      Palpations: Abdomen is soft.      Tenderness: There is no abdominal  tenderness. There is no guarding.     Musculoskeletal:      Cervical back: No rigidity.      Comments: Bilateral AKA, sites appear well healed  Bottom surface of right leg stump noted to have one ~0.5 inch wound with scant drainage, packing in place. No notable surrounding erythema. No notable tenderness to area. No appreciable local edema.     Skin:     General: Skin is warm and dry.      Coloration: Skin is not jaundiced.     Neurological:      General: No focal deficit present.      Mental Status: She is alert and oriented to person, place, and time. Mental status is at baseline.     Psychiatric:         Mood and Affect: Mood normal.         Behavior: Behavior normal.         Thought Content: Thought content normal.         Judgment: Judgment normal.         Review of Systems:  Review of Systems   Constitutional:  Negative for appetite change, chills, diaphoresis, fatigue and fever.   HENT:  Negative for congestion, drooling, ear pain, hearing loss, rhinorrhea, sore throat and trouble swallowing.    Eyes:  Negative for pain, discharge, redness, itching and visual disturbance.   Respiratory:  Negative for cough, chest tightness, shortness of breath and wheezing.    Cardiovascular:  Negative for chest pain, palpitations and leg swelling.   Gastrointestinal:  Negative for abdominal pain, blood in stool, constipation, diarrhea, nausea and vomiting.   Genitourinary:  Negative for difficulty urinating, dysuria, flank pain and hematuria.   Musculoskeletal:  Positive for gait problem. Negative for arthralgias, back pain and neck pain.   Skin:  Positive for wound. Negative for color change.   Neurological:  Negative for dizziness, facial asymmetry, speech difficulty, weakness, light-headedness and headaches.   Psychiatric/Behavioral:  Negative for agitation, behavioral problems and confusion. The patient is not nervous/anxious and is not hyperactive.    All other systems reviewed and are negative.      List of Current  Medications:  Current Outpatient Medications   Medication Instructions    acetaminophen (TYLENOL) 650 mg, Every 4 hours PRN    ALPRAZolam (XANAX) 0.25 mg, Daily PRN    amiodarone 100 mg, Daily    amLODIPine (NORVASC) 10 mg tablet TAKE 1 TABLET BY MOUTH EVERY DAY    apixaban (ELIQUIS) 5 mg, 2 times daily    aspirin 81 mg, Daily    atorvastatin (LIPITOR) 40 mg tablet TAKE 1 TABLET BY MOUTH EVERY DAY    calcium carbonate (TUMS) 500 mg chewable tablet 2 tablets, Every 6 hours PRN    Continuous Blood Gluc  (FreeStyle Noé 14 Day [place]) LITO 1 Units, Does not apply, Continuous    Continuous Blood Gluc Sensor (FreeStyle Noé 14 Day Sensor) MISC Use daily as directed for CGM - Change every 14 days    Continuous Glucose  (FreeStyle Noé 3 [place]) LITO 1 Device, Does not apply, Daily    Continuous Glucose Sensor (FreeStyle Noé 3 Plus Sensor) MISC 1 each, Does not apply, Every 15 days    doxycycline (DORYX) 100 mg, Oral, 2 times daily    ferrous sulfate 325 mg, Daily with breakfast    gabapentin (NEURONTIN) 200 mg, Oral, 2 times daily    hydrALAZINE (APRESOLINE) 100 mg, Oral, Every 8 hours scheduled    hydroCHLOROthiazide 12.5 mg, Oral, Daily    insulin glargine (LANTUS) 100 units/mL subcutaneous injection Inject 46 Units under the skin every morning. Dose change    Insulin Pen Needle (B-D UF III MINI PEN NEEDLES) 31G X 5 MM MISC USE WITH INSULIN FOUR TIMES DAILY    Lancets 28G MISC 1 each, Does not apply, 4 times daily    latanoprost (XALATAN) 0.005 % ophthalmic solution     Levothyroxine Sodium 62.5 MCG CAPS Take by mouth    lisinopril (ZESTRIL) 40 mg tablet TAKE 1 TABLET BY MOUTH EVERY DAY    metoprolol tartrate (LOPRESSOR) 50 mg, Oral, Every 12 hours scheduled    Multiple Vitamin (multivitamin) tablet 1 tablet, Daily    nystatin (MYCOSTATIN) powder Topical, 2 times daily    pantoprazole (PROTONIX) 20 mg, Daily    polyethylene glycol (MIRALAX) 17 g, Daily PRN    Psyllium 25 % PACK 1 packet, Daily     saccharomyces boulardii (FLORASTOR) 250 mg, 2 times daily    semaglutide (0.25 or 0.5 mg/dose) (OZEMPIC (0.25 OR 0.5 MG/DOSE)) 0.25 mg, Subcutaneous, Every 7 days    sertraline (ZOLOFT) 100 mg tablet TAKE 1 TABLET BY MOUTH EVERY DAY    thiamine (VITAMIN B1) 100 mg, Oral, Daily         Medication reviewed. All orders signed. Complete list is in the paper chart.     Allergies  Allergies[6]    Labs/Diagnostics (reviewed by this provider):     I personally reviewed lab results and imaging studies. Full reports are in the paper chart.     Assessment/Plan:    Advance care planning  HCP and code status discussion as per note      At risk for constipation  At risk due to hospitalization, relative immobility, comorbidities  Monitor stool output  Bowel regimen at facility: as per protocol, adjust as appropriate; consider bisacodyl suppository PRN if no BM in 2-3 days  Encourage mobility as tolerated, PO hydration as appropriate, high fiber diet/prune juice (in outpatient setting as appropriate)  Goal is for 1 easy BM every 1-2 days      At risk for delirium  Delirium precautions  -Patient is high risk of delirium due to age, comorbidities, hospitalization/unfamiliar environment  -delirium precautions  -maintain normal sleep/wake cycle  -minimize overnight interruptions, group overnight vitals/labs/nursing checks as possible  -dim lights, close blinds and turn off tv to minimize stimulation and encourage sleep environment in evenings  -ensure that pain is well controlled  -monitor for fecal and urinary retention which may precipitate delirium  -encourage early mobilization and ambulation  -provide frequent reorientation and redirection  -encourage family and friends at the bedside to help help calm patient if anxious  -avoid medications which may precipitate or worsen delirium such as tramadol, benzodiazepine, anticholinergics, and benadryl  -encourage hydration and nutrition   -redirect unwanted behaviors as first line, avoid  physical restraints, use chemical restraint only if all other attempts have been unsuccessful      Ambulatory dysfunction  -PT/OT as appropriate  -fall precautions  -encourage appropriate DME use      DM (diabetes mellitus) type II, controlled, with peripheral vascular disorder ([facility])    Lab Results   Component Value Date    HGBA1C 9.1 (A) 03/19/2025     Recent A1c 9.1 worsened  Encourage CCD  Monitor glucose routinely - recent fasting sugars have generally been mid-high 100s, not yet well controlled but overall better than before  Avoid hypoglycemia  Encourage lifestyle modifications including healthy diet, weight management, exercise as appropriate  Following Endocrine outpatient. Next appt June 2025  Follow up Ophthalmology/Podiatry (at facility) as appropriate  Continue Lantus, will increase from 60 to 65u daily, titrate further as appropriate. Consider splitting to BID dosing with further increased  Recently changed from Trulicity to Ozempic per Endocrine. Has been titrated up to 1mg weekly, appears to be tolerating well  One of her goals has been to reduce pill burden therefore preferring generally not to add further meds but to titrate existing ones  Follow up HgbA1c Q3 months July/Oct/Jan/April      Hypertension  Monitor BP - generally stable, somewhat variable, around 120s-140s systolic, at times lower or higher  Pulse generally 60s-80s  Avoid hypotension  No acute cardiac complaints  Continue regimen including amlodipine 10mg daily, lisinopril 40mg daily, metoprolol tartrate 50mg BID, HCTZ 12.5mg daily, hydralazine 100mg TID, amiodarone 100mg daily  Consider weaning/adjusting extensive regimen as appropriate, especially if BP remains well controlled or becomes softer. For now seems stable on current regimen  Following Cardiology outpatient    PAD (peripheral artery disease) ([facility])  S/p left AKA on 11/23/22  S/p right AKA stump revision on 01/27/23  Continue aspirin, statin, Eliquis  Working with rehab  regarding prosthetics and power wheelchair, PT/OT as appropriate  Follow up with Vascular as appropriate    Esophageal reflux  -stable per patient  -continue PRN calcium carbonate. Pantoprazole recently discontinued and she seems stable without this.  -recommend OOB with meals, sit upright for at least 30 minutes afterwards, avoid trigger foods  -continue to monitor  -follow up with GI as appropriate/needed    Acquired hypothyroidism  Last TSH elevated from Feb 2024  Continue levothyroxine - recent increase to 62.5mcg daily, continue same at present as subsequent TSH improving as of April  No apparent acute/associated symptoms   Monitor TSH periodically; Adjust dosing as appropriate  next TSH check in June 2025  Patient is following Endocrine outpatient    Hyperparathyroidism (HCC)  Seems to be primary on chart review  Continue multivitamin/vit D  Monitor calcium on routine labs - has been stable/WNL recently  Following Endocrine outpatient    Chronic renal insufficiency, stage 3 (moderate) ([facility])  Lab Results   Component Value Date    EGFR 40 05/28/2025    EGFR 35 05/27/2025    EGFR 36 05/26/2025    CREATININE 1.39 (H) 05/28/2025    CREATININE 1.54 (H) 05/27/2025    CREATININE 1.50 (H) 05/26/2025       Baseline GFR seems consistent with CKD3a  Monitor renal function on routine labs - fairly stable, some recent worsening inpatient, will recheck shortly at facility  Avoid nephrotoxins, NSAIDs as able  Encourage PO hydration, respecting volume status      Anxiety and depression  mood stable and well controlled overall. Mood tends to be down over winter/holidays. Gets down at times. Gets anxious around appointments/procedures  Psych following at facility  Supportive care  Continue sertraline 100 mg po daily at bedtime  Has alprazolam 0.25mg daily PRN as well  (Was on quetiapine, has been weaned and discontinued as of April 2024 and appears tolerating well without it)  (Was on bupropion, has been discontinued as of Jan  2025 and appears stable without it)  Not yet ready to try weaning above regimen, continue to look for opportunities for GDR in conjunction with Psych    Anemia  Seems fairly mild, stable on lab review  -monitor on routine labs  -borderline microcytic and hx of low iron on lab review - continue ferrous sulfate  -likely component of CKD as well  -monitor for acute bleed - no present signs  -consider further workup, Heme consult if persistent/worsening  -transfuse PRN Hb <7  -low threshold to hold anticoagulation if evidence of bleed or worsening Hb    Leukemoid reaction  Patient with persistent mildly elevated WBC/neutrophilia since around June 2024  Seems to have coincided with likely viral URI and associated diarrhea around that time however has not improved  Monitor on routine labs - seems fairly mild but persistent as of Dec 2024.  Monitor for acute infectious symptoms  Patient denies any unintentional weight loss, fevers, night sweats, chills, or other overt signs of systemic or malignant pathology.  Has established with Heme/Onc, issue considered likely secondary to inflammation and chronic medical conditions including diabetes, further workup pending    S/P bilateral above knee amputation (HCC)  Working with PT/OT  Working with prosthetics  Working with power wheelchair training    Phantom pain after amputation of lower extremity (HCC)  Phantom limb pains bilateral lower extremity, intermittent, at random times but generally worse overnight  Gabapentin recently reduced to 100mg BID, she feels stable with current dosing  Continue to monitor - much improved/controlled    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)  Bilateral AKA as above    History of CVA (cerebrovascular accident)  Pt had right CVA 11/14/2022  Continue aspirin, statin  Pt has been off plavix since 03/2023 after vascular visit    Complication of amputated stump (HCC)  Recent issue with non-healing right lower extremity AKA stump  wound  Recently hospitalized for this due to some concern of osteomyelitis  Was eval by Vascular and ID inpatient. Osteomyelitis was considered unlikely per ID.  Enterococcus on wound culture  s/p washout and debridement 5/23/25  treated with antibiotics including invanz switched to doxycycline through 6/2/25  Wound care consulted and following at facility. Have discussed wound care/packing instructions  Manage diabetes to help improve wound healing potential  Will consult Dietary to consider protein supplement/Glucerna to assist with wound healing  Continue to monitor  Follow up with Vascular as scheduled    Familial combined hyperlipidemia  Continue statin     History of cardiac arrest  S/p cardiac arrest on 1/18/2023 considered due to electrolyte abnormalities/severe sepsis  No recent/acute cardiac complaints  Continue cardiac regimen including aspirin, Eliquis, amiodarone, atorvastatin, beta blocker  Follow up with Cardiology as appropriate/yearly    Prolonged Q-T interval on ECG  Noted on prior EKGs  No apparent acute/associated symptoms  Avoid QT prolonging medications as able  Following with Cardiology       Pain: none  Rehab Potential:Fair  Patient Informed of Medical Condition: yes   Patient is Capable of Understanding Their Right: yes   Discharge Plan: continue LTC  Vaccination:   Immunization History   Administered Date(s) Administered    COVID-19 MODERNA VACC 0.5 ML IM 04/15/2021, 05/13/2021, 12/21/2021    COVID-19 Moderna Vac BIVALENT 12 Yr+ IM 0.5 ML 07/27/2023    COVID-19 Pfizer Vac BIVALENT H[place]y-sucrose 12 Yr+ IM 01/11/2023    INFLUENZA 12/21/2021    Influenza, recombinant, quadrivalent,injectable, preservative free 10/19/2018, 10/01/2019, 12/02/2020    Pneumococcal Polysaccharide PPV23 10/01/2019    Tdap 03/23/2021    Zoster Vaccine Recombinant 08/27/2021, 01/24/2022           Advanced Directives: Patient verbalizes primary HCP as daughter Jailyn  Code status:Full Code Extensive discussion/education  with patient today regarding their wishes with respect to resuscitative measures; discussed as appropriate potential risks vs benefits of resuscitative measures; patient verbalizes understanding, appears to have capacity to make this decision presently, and clearly verbalizes a desire for remaining Full Code (though would not wish to persist long-term in vegetative state via artificial means if no meaningful hope of recovery)          -Total time spent on this encounter today including documentation and workup review, face to face time, history and exam, and documentation/orders was approximately 75 minutes.  -This note will be copied to [facility] EMR where vitals and medication orders are placed.    Seb Chun D.O.  Geriatric Medicine  5/30/2025 3:03 PM         [1]   Allergies  Allergen Reactions    Heparin Other (See Comments)     History of HIT   [2]   Past Medical History:  Diagnosis Date    Altered mental status 1/24/2023    Anxiety     Depression     Diabetes (HCC)     Diabetes mellitus (HCC)     Diarrhea 11/14/2022    Epigastric pain 2/20/2022    Esophageal reflux     28 APR 2015 RESOLVED    Fall 1/17/2023    Forehead laceration 1/23/2023    Hyperlipidemia     Hypertension     Low back pain     sciatica    Pneumonia 2019    Stroke (HCC) 12/2015    small vessel, L frontal corona radiata. Rx asa 81, Lipitor 40, risk modification    Vitamin D deficiency    [3]   Past Surgical History:  Procedure Laterality Date    AMPUTATION ABOVE KNEE (AKA) Right 12/1/2022    Procedure: (AKA), PSB  BKA;  Surgeon: Johnathan Britton DO;  Location: [facility] OR;  Service: Vascular    ARTERIOGRAM Right 11/7/2022    Procedure: -Right lower extremity angiogram -Right CFA balloon angioplasty with [id number]  [employer]  -Right CFA DCB with 6x40 Bard Lutonix -Right CFA atherectomy with Jetstream and distal embolization protection with 7mm Spider FX -Right SFA/Pop angioplasty with 4x40 Chololate balloon -Right SFA/Pop DCB with  5x150 Bard Lutonix -Right SFA stent with 6x80 [place] [employer] Rosalinda x2 -R    COLONOSCOPY      IR AORTAGRAM WITH RUN-OFF  10/31/2022    IR LOWER EXTREMITY ANGIOGRAM  11/10/2022    IR LOWER EXTREMITY ANGIOGRAM  2022    KS AMP LEG THRU TIBIA&FIBULA SEC CLOSURE/SCAR REV Right 2023    Procedure: AMPUTATION REVISION STUMP;  Surgeon: Johnathan Britton DO;  Location: [facility];  Service: Vascular    KS AMPUTATION THIGH THROUGH FEMUR ANY LEVEL Left 2022    Procedure: (AKA);  Surgeon: Silvano Thompson DO;  [facility];  Service: Vascular    KS NEGATIVE PRESSURE WOUND THERAPY DME <= 50 SQ CM Bilateral 2022    Procedure: Right above knee amputation washout; vac change; Left above knee amputation debridement; vac placement;  Surgeon: Johnathan Britton DO;  [facility];  Service: Vascular    WOUND DEBRIDEMENT Right 2022    Procedure: AKA STUMP WASHOUT, Left AKA Staple removal. application of wound vac to Right AKA;  Surgeon: Wan Silva MD;  [facility];  Service: Vascular    WOUND DEBRIDEMENT Right 2025    Procedure: WASHOUT AND DEBRIDEMENT OF RIGHT AKA WITH POSSIBLE DRAIN PLACEMENT;  Surgeon: Sridevi Isaac MD;  [facility];  Service: Vascular   [4]   Family History  Problem Relation Name Age of Onset    Diabetes Mother      Lung cancer Mother      Cancer Father      Lung cancer Father      Hypertension Brother      Hypertension Family     [5]   Social History  Tobacco Use   Smoking Status Former    Current packs/day: 0.00    Types: Cigarettes    Quit date:     Years since quittin.4   Smokeless Tobacco Never   [6]   Allergies  Allergen Reactions    Heparin Other (See Comments)     History of HIT

## 2025-05-30 NOTE — ASSESSMENT & PLAN NOTE
mood stable and well controlled overall. Mood tends to be down over winter/holidays. Gets down at times. Gets anxious around appointments/procedures  Psych following at facility  Supportive care  Continue sertraline 100 mg po daily at bedtime  Has alprazolam 0.25mg daily PRN as well  (Was on quetiapine, has been weaned and discontinued as of April 2024 and appears tolerating well without it)  (Was on bupropion, has been discontinued as of Jan 2025 and appears stable without it)  Not yet ready to try weaning above regimen, continue to look for opportunities for GDR in conjunction with Psych

## 2025-05-30 NOTE — ASSESSMENT & PLAN NOTE
Phantom limb pains bilateral lower extremity, intermittent, at random times but generally worse overnight  Gabapentin recently reduced to 100mg BID, she feels stable with current dosing  Continue to monitor - much improved/controlled

## 2025-06-02 NOTE — UTILIZATION REVIEW
NOTIFICATION OF ADMISSION DISCHARGE   This is a Notification of Discharge from [facility]. Please be advised that this patient has been discharge from our facility. Below you will find the admission and discharge date and time including the patient’s disposition.   UTILIZATION REVIEW CONTACT:  Utilization Review Assistants  Network Utilization Review Department  Phone: [phone] x carefully listen to the prompts. All voicemails are confidential.  Email: [email or web address]     ADMISSION INFORMATION  PRESENTATION DATE: 5/22/2025 12:04 PM  OBERVATION ADMISSION DATE: N/A  INPATIENT ADMISSION DATE: 5/22/25  3:14 PM   DISCHARGE DATE: 5/28/2025  2:42 PM   DISPOSITION:Discharged/Transferred to Long Term Care/Personal Care Home/Assisted Living    Network Utilization Review Department  ATTENTION: Please call with any questions or concerns to [phone] and carefully listen to the prompts so that you are directed to the right person. All voicemails are confidential.   For Discharge needs, contact Care Management DC Support Team at [phone] opt. 2  Send all requests for admission clinical reviews, approved or denied determinations and any other requests to dedicated fax number below belonging to the campus where the patient is receiving treatment. List of dedicated fax numbers for the Facilities:  FACILITY NAME UR FAX NUMBER   ADMISSION DENIALS (Administrative/Medical Necessity) [phone]   DISCHARGE SUPPORT TEAM ([facility]) [phone]   PARENT CHILD HEALTH (Maternity/NICU/Pediatrics) [phone]   [facility] [phone]   [facility] [phone]   [facility] [phone]   [facility] [phone]   [facility] [phone]   [facility] [phone]   [facility] [phone]   [facility]  [facility] [phone]   [facility] [phone]   [facility] [phone]   [facility] [phone]   [facility] [phone]

## 2025-06-10 ENCOUNTER — OFFICE VISIT (OUTPATIENT)
Dept: VASCULAR SURGERY | Facility: CLINIC | Age: 65
End: 2025-06-10

## 2025-06-10 VITALS — HEART RATE: 71 BPM | DIASTOLIC BLOOD PRESSURE: 83 MMHG | SYSTOLIC BLOOD PRESSURE: 157 MMHG | OXYGEN SATURATION: 94 %

## 2025-06-10 DIAGNOSIS — T87.9 COMPLICATION OF AMPUTATED STUMP (HCC): Primary | ICD-10-CM

## 2025-06-10 PROCEDURE — 99024 POSTOP FOLLOW-UP VISIT: CPT

## 2025-06-10 NOTE — ASSESSMENT & PLAN NOTE
Patient reports that she is finished her antibiotic course.  She reports that the wound to her right AKA stump has nearly completely healed, however there is still some mild serosanguineous drainage from her stump.  She receives wound care daily.  Patient is denying any fever, chills, redness, warmth, or pain to the right AKA stump.    Plan:  - Right AKA stump wound is nearly completely healed.  -Stump cleansed with saline and small square of Puracol applied to residual superficial wound.  -Patient to continue daily dressing changes after 2 days with Puracol in place.  -Patient instructed to notify the office should she develop any fever, chills, redness, warmth, or worsening drainage from right AKA stump.  -Patient may follow-up with vascular surgery as needed.

## 2025-06-10 NOTE — PROGRESS NOTES
Name: Dianna Varghese      : 1960      MRN: [id number]  Encounter Provider: ALEX Low  Encounter Date: 6/10/2025   Enc[facility] department: [facility] [place]  :  Assessment & Plan  Complication of amputated stump (HCC)  Patient reports that she is finished her antibiotic course.  She reports that the wound to her right AKA stump has nearly completely healed, however there is still some mild serosanguineous drainage from her stump.  She receives wound care daily.  Patient is denying any fever, chills, redness, warmth, or pain to the right AKA stump.    Plan:  - Right AKA stump wound is nearly completely healed.  -Stump cleansed with saline and small square of Puracol applied to residual superficial wound.  -Patient to continue daily dressing changes after 2 days with Puracol in place.  -Patient instructed to notify the office should she develop any fever, chills, redness, warmth, or worsening drainage from right AKA stump.  -Patient may follow-up with vascular surgery as needed.             History of Present Illness   HPI  Dianna Varghese is a 64 y.o. female 64 y.o. female, former smoker, with PMH HTN, HLD, type II DM, hyperparathyroidism, CVA, CKD 3, and PAD s/p bilateral AKA. Patient is presenting to the vascular surgery office for initial postop evaluation following right AKA stump washout 2025.    Patient was hospitalized from 2025 to 2025 due to right AKA stump wound.  Patient underwent debridement and received a 7-day course of antibiotics for Enterobacter.  Patient reports that she is finished her antibiotic course.  She reports that the wound to her right AKA stump has nearly completely healed, however there is still some mild serosanguineous drainage from her stump.  She receives wound care daily.  Patient is denying any fever, chills, redness, warmth, or pain to the right AKA stump.    History obtained from: patient    Review of Systems   Constitutional:   Negative for chills and fever.   Respiratory:  Negative for shortness of breath.    Cardiovascular:  Negative for chest pain.   Skin:  Positive for wound. Negative for color change and pallor.   Neurological:  Negative for numbness.     Pertinent Medical History   Past Medical History[1]        Medical History Reviewed by provider this encounter:     .  Medications Ordered Prior to Encounter[2]   Social History[3]     Objective   /83 (BP Location: Right arm, Patient Position: Sitting)   Pulse 71   SpO2 94%      Physical Exam  Vitals and nursing note reviewed.   Constitutional:       General: She is not in acute distress.     Appearance: Normal appearance. She is well-developed.   HENT:      Head: Normocephalic and atraumatic.     Eyes:      Conjunctiva/sclera: Conjunctivae normal.       Cardiovascular:      Rate and Rhythm: Normal rate and regular rhythm.   Pulmonary:      Effort: Pulmonary effort is normal. No respiratory distress.     Musculoskeletal:         General: No swelling or tenderness.      Cervical back: Normal range of motion and neck supple.     Skin:     General: Skin is warm and dry.      Capillary Refill: Capillary refill takes less than 2 seconds.      Findings: Wound present. No lesion or rash.          Neurological:      General: No focal deficit present.      Mental Status: She is alert and oriented to person, place, and time.     Psychiatric:         Mood and Affect: Mood normal.         Administrative Statements   I have spent a total time of 20 minutes in caring for this patient on the day of the visit/encounter including Prognosis, Risks and benefits of tx options, Instructions for management, Patient and family education, Importance of tx compliance, Risk factor reductions, Impressions, Counseling / Coordination of care, Documenting in the medical record, Reviewing/placing orders in the medical record (including tests, medications, and/or procedures), and Obtaining or reviewing  history  .         [1]   Past Medical History:  Diagnosis Date    Altered mental status 1/24/2023    Anxiety     Depression     Diabetes (HCC)     Diabetes mellitus (HCC)     Diarrhea 11/14/2022    Epigastric pain 2/20/2022    Esophageal reflux     28 APR 2015 RESOLVED    Fall 1/17/2023    Forehead laceration 1/23/2023    Hyperlipidemia     Hypertension     Low back pain     sciatica    Pneumonia 2019    Stroke (HCC) 12/2015    small vessel, L frontal corona radiata. Rx asa 81, Lipitor 40, risk modification    Vitamin D deficiency    [2]   Current Outpatient Medications on File Prior to Visit   Medication Sig Dispense Refill    acetaminophen (TYLENOL) 325 mg tablet Take 650 mg by mouth every 4 (four) hours as needed for mild pain      ALPRAZolam (XANAX) 0.25 mg tablet Take 0.25 mg by mouth daily as needed for anxiety      amiodarone 100 mg tablet Take 100 mg by mouth in the morning.      amLODIPine (NORVASC) 10 mg tablet TAKE 1 TABLET BY MOUTH EVERY DAY 90 tablet 1    apixaban (ELIQUIS) 5 mg Take 5 mg by mouth in the morning and 5 mg in the evening.      aspirin 81 mg chewable tablet Chew 81 mg in the morning.      atorvastatin (LIPITOR) 40 mg tablet TAKE 1 TABLET BY MOUTH EVERY DAY 90 tablet 1    calcium carbonate (TUMS) 500 mg chewable tablet Chew 2 tablets every 6 (six) hours as needed for indigestion or heartburn      Continuous Blood Gluc  (FreeStyle Noé 14 Day [place]) LITO Use 1 Units continuous 1 each 0    Continuous Blood Gluc Sensor (FreeStyle Noé 14 Day Sensor) MISC Use daily as directed for CGM - Change every 14 days 2 each 1    Continuous Glucose  (FreeStyle Noé 3 [place]) LITO Use 1 Device in the morning 1 each 0    Continuous Glucose Sensor (FreeStyle Noé 3 Plus Sensor) MISC Use 1 each every 15 (fifteen) days 6 each 2    ferrous sulfate 325 (65 Fe) mg tablet Take 325 mg by mouth daily with breakfast      gabapentin (NEURONTIN) 100 mg capsule Take 2 capsules (200 mg total) by  mouth in the morning and 2 capsules (200 mg total) before bedtime.      hydrALAZINE (APRESOLINE) 100 MG tablet Take 1 tablet (100 mg total) by mouth every 8 (eight) hours  0    hydrochlorothiazide (HYDRODIURIL) 12.5 mg tablet Take 1 tablet (12.5 mg total) by mouth daily Do not start before February 4, 2023.  0    insulin glargine (LANTUS) 100 units/mL subcutaneous injection Inject 46 Units under the skin every morning. Dose change (Patient taking differently: Inject 50 Units under the skin every morning. Dose change) 10 mL 4    Insulin Pen Needle (B-D UF III MINI PEN NEEDLES) 31G X 5 MM MISC USE WITH INSULIN FOUR TIMES DAILY 200 each 3    Lancets 28G MISC Use 1 each 4 (four) times a day 400 each 0    latanoprost (XALATAN) 0.005 % ophthalmic solution       Levothyroxine Sodium 62.5 MCG CAPS Take by mouth      lisinopril (ZESTRIL) 40 mg tablet TAKE 1 TABLET BY MOUTH EVERY DAY 90 tablet 1    metoprolol tartrate (LOPRESSOR) 50 mg tablet Take 1 tablet (50 mg total) by mouth every 12 (twelve) hours  0    Multiple Vitamin (multivitamin) tablet Take 1 tablet by mouth in the morning.      nystatin (MYCOSTATIN) powder Apply topically 2 (two) times a day      pantoprazole (PROTONIX) 20 mg tablet Take 20 mg by mouth in the morning.      polyethylene glycol (MIRALAX) 17 g packet Take 17 g by mouth daily as needed for constipation      Psyllium 25 % PACK Take 1 packet by mouth in the morning.      saccharomyces boulardii (FLORASTOR) 250 mg capsule Take 250 mg by mouth in the morning and 250 mg in the evening.      semaglutide, 0.25 or 0.5 mg/dose, (Ozempic, 0.25 or 0.5 MG/DOSE,) 2 mg/3 mL injection pen Inject 0.375 mL (0.25 mg total) under the skin every 7 days 3 mL 3    sertraline (ZOLOFT) 100 mg tablet TAKE 1 TABLET BY MOUTH EVERY DAY 90 tablet 1    thiamine (VITAMIN B1) 100 mg tablet Take 1 tablet (100 mg total) by mouth daily Do not start before February 4, 2023.  0     No current facility-administered medications on file  prior to visit.   [3]   Social History  Tobacco Use    Smoking status: Former     Current packs/day: 0.00     Types: Cigarettes     Quit date:      Years since quittin.4    Smokeless tobacco: Never   Vaping Use    Vaping status: Never Used   Substance and Sexual Activity    Alcohol use: Never     Comment: OCCASIONAL ALCOHOL USE IN ALL SCRIPTS    Drug use: No

## 2025-06-17 NOTE — ASSESSMENT & PLAN NOTE
· Positive heparin antibody on 11/11/2022  · Completed 5 days of overlap with argatroban and Coumadin   · Continue coumadin for minimum of 4 weeks  · INR therapeutic; however appears to decline on 3 mg daily; have increased to 4 mg daily  · Goal INR 2-3 No

## 2025-06-20 ENCOUNTER — OFFICE VISIT (OUTPATIENT)
Dept: WOUND CARE | Facility: CLINIC | Age: 65
End: 2025-06-20
Payer: COMMERCIAL

## 2025-06-20 VITALS
RESPIRATION RATE: 18 BRPM | DIASTOLIC BLOOD PRESSURE: 74 MMHG | TEMPERATURE: 97 F | BODY MASS INDEX: 36.67 KG/M2 | WEIGHT: 207 LBS | SYSTOLIC BLOOD PRESSURE: 146 MMHG | HEART RATE: 72 BPM

## 2025-06-20 DIAGNOSIS — E11.51 DM (DIABETES MELLITUS) TYPE II, CONTROLLED, WITH PERIPHERAL VASCULAR DISORDER (HCC): ICD-10-CM

## 2025-06-20 DIAGNOSIS — L92.9 HYPERGRANULATION: ICD-10-CM

## 2025-06-20 DIAGNOSIS — T81.89XA NON-HEALING SURGICAL WOUND, INITIAL ENCOUNTER: Primary | ICD-10-CM

## 2025-06-20 PROCEDURE — 99213 OFFICE O/P EST LOW 20 MIN: CPT | Performed by: NURSE PRACTITIONER

## 2025-06-20 PROCEDURE — 17250 CHEM CAUT OF GRANLTJ TISSUE: CPT | Performed by: NURSE PRACTITIONER

## 2025-06-20 PROCEDURE — 99204 OFFICE O/P NEW MOD 45 MIN: CPT | Performed by: NURSE PRACTITIONER

## 2025-06-20 NOTE — PATIENT INSTRUCTIONS
Orders Placed This Encounter   Procedures    Wound cleansing and dressings Right;Distal Knee     Wash your hands with soap and water.  Remove old dressing, discard into plastic bag and place in trash.  Cleanse the wound with normal saline prior to applying a clean dressing. Do not use tissue or cotton balls. Do not scrub the wound. Pat dry using gauze.  Shower yes, or bed bathe on the days the dressing is changed.         Right amputation site wound:  Apply Puracol AG to the wound.  Moisten with saline first and then tuck into depth of wound.  Cover with a fluffed 4x4 gauze to fill in the deficit.   Cover with silicon bordered foam.  Change dressing three times a week.       Increase your protein to 3-4 servings per day. Please add glucerna to the patient's diet daily.         Follow up at the wound center in one week.     Standing Status:   Future     Expiration Date:   6/27/2025

## 2025-06-20 NOTE — PROGRESS NOTES
Wound Procedure Treatment Right;Distal Knee    Performed by: Papito Grace RN  Authorized by: ALEX Valenzuela  Associated wounds:   Wound 05/22/25 Surgical Knee Right;Distal    Wound cleansed with:  NSS   Applied primary dressing:  Collagen dressing   Applied secondary dressing:  Gauze and Foam   Comments:  Puracol AG, mepilex

## 2025-06-20 NOTE — PROGRESS NOTES
Name: Dianna Varghese      : 1960      MRN: [id number]  Encounter Provider: ALEX Valenzuela  Encounter Date: 2025   Encounter department: [facility]  :  Assessment & Plan  Non-healing surgical wound, initial encounter    Orders:    Wound cleansing and dressings Right;Distal Knee; Future    Wound Procedure Treatment Right;Distal Knee    DM (diabetes mellitus) type II, controlled, with peripheral vascular disorder (HCC)    Lab Results   Component Value Date    HGBA1C 9.1 (A) 2025            Hypergranulation         Plan:  1.  Initial visit.  Wound hypergranular which was silver nitrated today patient has a full-thickness nonhealing surgical wound of her right AKA stump not showing any signs symptoms of infection.    2.  Will have Puracol Ag gently tucked into wound.  Will have fluffed gauze applied to fill deficit and will have her entire stump covered with a large silicone bordered foam dressing.  Dressing is to be changed 3 times a week    3.  A1C results reviewed with the patient today.  Per patient's chart, her most recent A1c was 9.1 as of 3/19/2025.  Patient is to continue to follow recommendations to achieve tight glycemic control.  Counseled patient on the importance of tight glycemic control to enhance wound healing    4.  Patient is to continue to offload pressure from her wound to enhance wound healing    5.  Counseled patient to reach out to her primary provider at [facility] to discuss her right AKA stump pain.  Patient verbalized agreement and understanding    6.  Patient will follow-up in 1 week    History of Present Illness   Chief Complaint   Patient presents with    New Patient Visit     The patient developed a blister on her right amputation site about one month ago. The amputation surgery was three years ago. She had not been wearing her prosthetic for several weeks prior to developing the blister. Vascular performed an I and D a couple  weeks ago. She is at [facility] and the nurse has been applying betadine. The patient is diabetic.    Here for wound follow up.  Patient is a 64-year-old female who presents to  wound center as a new patient for a nonhealing surgical wound of her right BKA stump.  Patient reports she developed a right AKA stump wound approximately 1 month ago.  She is unsure of the exact cause of her wound.  She was recently hospitalized for her right stump wound from 5/22/2025 to 5/28/2025.  During her hospitalization she underwent a washout and debridement by vascular surgery.  Patient resides at [facility] as a long-term resident.  She reports nursing staff at [facility] were managing her wound with Betadine and silicone bordered foam dressings changing her dressing daily.  Patient has a history of type 2 diabetes.  Per patient's chart her most recent A1c was 9.1 as of 3/19/2025.  She complains of frequent pain in her right BKA stump which wraps around her entire stump and keeps her awake at night.  She denies any fevers or chills      Objective   /74   Pulse 72   Temp (!) 97 °F (36.1 °C)   Resp 18   Wt 93.9 kg (207 lb)   BMI 36.67 kg/m²     Physical Exam  Vitals and nursing note reviewed.   Constitutional:       General: She is not in acute distress.     Appearance: Normal appearance. She is obese.   HENT:      Head: Normocephalic and atraumatic.     Eyes:      General:         Right eye: No discharge.         Left eye: No discharge.     Pulmonary:      Effort: Pulmonary effort is normal. No respiratory distress.     Musculoskeletal:         General: Deformity present.      Cervical back: Normal range of motion and neck supple. No rigidity.      Comments: Bilateral AKA's     Skin:     General: Skin is warm and dry.      Findings: Wound present. No erythema.          Neurological:      General: No focal deficit present.      Mental Status: She is alert and oriented to person, place, and time. Mental status is at  "baseline.     Psychiatric:         Mood and Affect: Mood normal.         Behavior: Behavior normal.         Thought Content: Thought content normal.         Judgment: Judgment normal.       Wound 05/22/25 Surgical Knee Right;Distal (Active)   Wound Image   06/20/25 1419   Wound Description BRADY;Bleeding 06/20/25 1422   Non-staged Wound Description Full thickness 06/20/25 1422   Wound Length (cm) 0.2 cm 06/20/25 1422   Wound Width (cm) 0.2 cm 06/20/25 1422   Wound Depth (cm) 0.5 cm 06/20/25 1422   Wound Surface Area (cm^2) 0.03 cm^2 06/20/25 1422   Wound Volume (cm^3) 0.01 cm^3 06/20/25 1422   Calculated Wound Volume (cm^3) 0.02 cm^3 06/20/25 1422   Drainage Amount Scant 06/20/25 1422   Drainage Description Yellow;Milky 06/20/25 1422   Michaela-wound Assessment Intact;Scar Tissue 06/20/25 1422   Wound packed? No 06/20/25 1422           Chemical caut of a wound Right;Distal Knee     Date/Time  6/20/2025 2:00 PM     Performed by  ALEX Valenzuela   Authorized by  ALEX Valenzuela      Associated wounds:   Wound 05/22/25 Surgical Knee Right;Distal     Universal Protocol   procedure performed by consultantConsent: Written consent obtained  Consent given by: patient  Time out: Immediately prior to procedure a \"time out\" was called to verify the correct patient, procedure, equipment, support staff and site/side marked as required.  Timeout called at: 6/20/2025 3:03 PM.  Patient understanding: patient states understanding of the procedure being performed  Patient consent: the patient's understanding of the procedure matches consent given  Site marked: the operative site was marked  Patient identity confirmed: verbally with patient      Local anesthesia used: yes      Anesthesia: see MAR for details     Anesthesia   Local anesthesia used: yes  Local Anesthetic: topical anesthetic     Sedation   Patient sedated: no        Specimen: no    Culture: no   Procedure Details   Procedure Notes: 1 silver nitrate stick applied " to hypergranulation tissue  Patient tolerance: patient tolerated the procedure well with no immediate complications            Results from last 6 Months   Lab Units 05/23/25  1510   WOUND CULTURE  1+ Growth of Enterobacter cloacae*

## 2025-06-23 RX ORDER — BUPROPION HYDROCHLORIDE 75 MG/1
75 TABLET ORAL DAILY
COMMUNITY

## 2025-06-23 RX ORDER — CETIRIZINE HYDROCHLORIDE 10 MG/1
10 TABLET ORAL DAILY
COMMUNITY

## 2025-06-27 ENCOUNTER — OFFICE VISIT (OUTPATIENT)
Dept: WOUND CARE | Facility: CLINIC | Age: 65
End: 2025-06-27
Payer: COMMERCIAL

## 2025-06-27 VITALS
RESPIRATION RATE: 18 BRPM | DIASTOLIC BLOOD PRESSURE: 74 MMHG | HEART RATE: 72 BPM | TEMPERATURE: 98.1 F | SYSTOLIC BLOOD PRESSURE: 158 MMHG

## 2025-06-27 DIAGNOSIS — T81.89XA NON-HEALING SURGICAL WOUND, INITIAL ENCOUNTER: Primary | ICD-10-CM

## 2025-06-27 DIAGNOSIS — E11.51 DM (DIABETES MELLITUS) TYPE II, CONTROLLED, WITH PERIPHERAL VASCULAR DISORDER (HCC): ICD-10-CM

## 2025-06-27 PROCEDURE — 10060 I&D ABSCESS SIMPLE/SINGLE: CPT | Performed by: NURSE PRACTITIONER

## 2025-06-27 PROCEDURE — 10140 I&D HMTMA SEROMA/FLUID COLLJ: CPT | Performed by: NURSE PRACTITIONER

## 2025-06-27 RX ORDER — LIDOCAINE HYDROCHLORIDE AND EPINEPHRINE 10; 10 MG/ML; UG/ML
10 INJECTION, SOLUTION INFILTRATION; PERINEURAL ONCE
Status: COMPLETED | OUTPATIENT
Start: 2025-06-27 | End: 2025-06-27

## 2025-06-27 RX ORDER — LIDOCAINE HYDROCHLORIDE 10 MG/ML
10 INJECTION, SOLUTION INFILTRATION; PERINEURAL ONCE
Status: SHIPPED | OUTPATIENT
Start: 2025-06-27

## 2025-06-27 RX ORDER — LIDOCAINE 40 MG/G
CREAM TOPICAL ONCE
Status: COMPLETED | OUTPATIENT
Start: 2025-06-27 | End: 2025-06-27

## 2025-06-27 RX ADMIN — LIDOCAINE 1 APPLICATION: 40 CREAM TOPICAL at 14:51

## 2025-06-27 RX ADMIN — LIDOCAINE HYDROCHLORIDE AND EPINEPHRINE 10 ML: 10; 10 INJECTION, SOLUTION INFILTRATION; PERINEURAL at 15:03

## 2025-06-27 NOTE — PATIENT INSTRUCTIONS
Orders Placed This Encounter   Procedures    Wound cleansing and dressings Right;Distal Knee     Wound cleansing and dressings Right;Distal Knee     Wash your hands with soap and water.  Remove old dressing, discard into plastic bag and place in trash.  Cleanse the wound with normal saline prior to applying a clean dressing. Do not use tissue or cotton balls. Do not scrub the wound. Pat dry using gauze.  Shower yes, or bed bathe on the days the dressing is changed.         Right amputation site wound:  Apply AMD packing to the wound base.    Cover with a fluffed 4x4 gauze to fill in the deficit.   Cover with ABD and secure with tape   Change dressing daily        Increase your protein to 3-4 servings per day. Please add glucerna to the patient's diet daily.            Follow up at the wound center in two weeks.     Standing Status:   Future     Expiration Date:   7/4/2025

## 2025-06-27 NOTE — PROGRESS NOTES
Wound Procedure Treatment Right;Distal Knee    Performed by: Jazzmine Vargas RN  Authorized by: ALEX aVlenzuela  Associated wounds:   Wound 05/22/25 Surgical Knee Right;Distal    Wound cleansed with:  NSS   Applied primary dressing:  Packing   Applied secondary dressing:  ABD and Gauze   Dressing secured with:  Tape   Comments:  AMD packing

## 2025-06-30 NOTE — PROGRESS NOTES
Name: Dianna Varghese      : 1960      MRN: [id number]  Encounter Provider: ALEX Valenzuela  Encounter Date: 2025   Encounter department: [facility] WOUND CARE  :  Assessment & Plan  Non-healing surgical wound, initial encounter    Orders:    Wound cleansing and dressings Right;Distal Knee; Future    lidocaine (LMX) 4 % cream    lidocaine (XYLOCAINE) 1 % injection 10 mL    lidocaine-epinephrine (XYLOCAINE/EPINEPHRINE) 1 %-1:100,000 injection 10 mL    Wound Procedure Treatment Right;Distal Knee    DM (diabetes mellitus) type II, controlled, with peripheral vascular disorder (HCC)    Lab Results   Component Value Date    HGBA1C 9.1 (A) 2025            Plan:  1.  F/U visit.  Patient's wound appears to have closed over superficially, however on assessment she has a fluctuant possible fluid collection/abscess present on her right BKA stump which is mildly painful to palpation.  I&D of area completed today.  Small amount of purulent drainage mixed with a larger amount of sanguinous drainage expressed.  Area was thoroughly flushed and deloculated.  Will have wound gently packed with AMD packing covered with fluffed 4 x 4 gauze to fill in defect secured with ABD and tape change daily    2.  A1C results reviewed with the patient today.  Patient is to continue to follow recommendations to achieve tight glycemic control    3.  Patient will follow-up in 2 weeks    History of Present Illness   Chief Complaint   Patient presents with    Follow Up Wound Care Visit   Here for wound follow up.  F/u visit for nonhealing surgical wound of right AKA stump.  RN staff from [facility] mention on patient's consult note that she may have developed a possible fluid collection/abscess where her right AKA wound is present.  Patient endorses mild pain to area of fluid collection on palpation today.  She reports nursing staff at [facility] have been unable to gently pack the Puracol which was recommended at  "her last wound care appointment.  She denies any fevers or chills.      Objective   /74   Pulse 72   Temp 98.1 °F (36.7 °C)   Resp 18       Wound 05/22/25 Surgical Knee Right;Distal (Active)   Wound Image   06/27/25 1447   Wound Description Epithelialization;Light purple 06/27/25 1448   Non-staged Wound Description Full thickness 06/20/25 1422   Wound Length (cm) 0.1 cm 06/27/25 1448   Wound Width (cm) 0.1 cm 06/27/25 1448   Wound Depth (cm) 0.1 cm 06/27/25 1448   Wound Surface Area (cm^2) 0.01 cm^2 06/27/25 1448   Wound Volume (cm^3) 0.001 cm^3 06/27/25 1448   Calculated Wound Volume (cm^3) 0 cm^3 06/27/25 1448   Change in Wound Size % 100 06/27/25 1448   Drainage Amount None 06/27/25 1448   Drainage Description Yellow;Milky 06/20/25 1422   Michaela-wound Assessment Intact;Scar Tissue;Purple 06/27/25 1448   Wound packed? No 06/20/25 1422       Incision and Drainage    Date/Time: 6/27/2025 2:45 PM    Performed by: ALEX Valenzuela  Authorized by: ALEX Valenzuela    [place] Protocol:  procedure performed by consultantConsent: Written consent obtained  Consent given by: patient  Time out: Immediately prior to procedure a \"time out\" was called to verify the correct patient, procedure, equipment, support staff and site/side marked as required.  Timeout called at: 6/30/2025 9:07 AM.  Patient understanding: patient states understanding of the procedure being performed  Patient consent: the patient's understanding of the procedure matches consent given  Procedure consent matches procedure scheduled: N/A.  Relevant documents present and verified: N/A.  Test results available and properly labeled: N/A.  Site marked: the operative site was marked  Imaging studies available: N/A.  Required blood products, implants, devices, and special equipment available: N/A.  Patient identity confirmed: verbally with patient    Patient location:  Clinic  Location:     Type:  Fluid collection and abscess    Location:  " Lower extremity    Lower extremity location:  R leg (R AKA Stump)  Pre-procedure details:     Skin preparation:  Betadine  Sedation:     Sedation type: n/a.  Anesthesia (see MAR for exact dosages):     Anesthesia method:  Local infiltration    Local anesthetic:  Lidocaine 1% WITH epi  Procedure details:     Complexity:  Simple    Needle aspiration: no      Incision types:  Single straight    Scalpel blade:  11    Approach:  Open    Incision depth:  Subcutaneous    Wound management:  Probed and deloculated, irrigated with saline and extensive cleaning    Drainage:  Bloody and purulent    Drainage amount:  Moderate    Wound treatment:  Packing placed (1/2 inch AMD packing)    Packing materials:  1/2 in gauze (AMD packing)  Post-procedure details:     Patient tolerance of procedure:  Tolerated well, no immediate complications     Results from last 6 Months   Lab Units 05/23/25  1510   WOUND CULTURE  1+ Growth of Enterobacter cloacae*

## 2025-07-01 ENCOUNTER — NURSING HOME VISIT (OUTPATIENT)
Dept: GERIATRICS | Facility: OTHER | Age: 65
End: 2025-07-01
Payer: COMMERCIAL

## 2025-07-01 DIAGNOSIS — E55.9 VITAMIN D DEFICIENCY: ICD-10-CM

## 2025-07-01 DIAGNOSIS — Z86.74 HISTORY OF CARDIAC ARREST: ICD-10-CM

## 2025-07-01 DIAGNOSIS — R26.2 AMBULATORY DYSFUNCTION: ICD-10-CM

## 2025-07-01 DIAGNOSIS — Z86.73 HISTORY OF CVA (CEREBROVASCULAR ACCIDENT): ICD-10-CM

## 2025-07-01 DIAGNOSIS — F41.9 ANXIETY AND DEPRESSION: ICD-10-CM

## 2025-07-01 DIAGNOSIS — S78.119S: ICD-10-CM

## 2025-07-01 DIAGNOSIS — Z89.612 S/P BILATERAL ABOVE KNEE AMPUTATION (HCC): ICD-10-CM

## 2025-07-01 DIAGNOSIS — N18.30 CHRONIC RENAL INSUFFICIENCY, STAGE 3 (MODERATE) (HCC): ICD-10-CM

## 2025-07-01 DIAGNOSIS — I10 PRIMARY HYPERTENSION: ICD-10-CM

## 2025-07-01 DIAGNOSIS — D72.823 LEUKEMOID REACTION: ICD-10-CM

## 2025-07-01 DIAGNOSIS — F32.A ANXIETY AND DEPRESSION: ICD-10-CM

## 2025-07-01 DIAGNOSIS — E03.9 ACQUIRED HYPOTHYROIDISM: ICD-10-CM

## 2025-07-01 DIAGNOSIS — E21.3 HYPERPARATHYROIDISM (HCC): ICD-10-CM

## 2025-07-01 DIAGNOSIS — K59.01 SLOW TRANSIT CONSTIPATION: ICD-10-CM

## 2025-07-01 DIAGNOSIS — I73.9 PAD (PERIPHERAL ARTERY DISEASE) (HCC): Chronic | ICD-10-CM

## 2025-07-01 DIAGNOSIS — D50.8 IRON DEFICIENCY ANEMIA SECONDARY TO INADEQUATE DIETARY IRON INTAKE: ICD-10-CM

## 2025-07-01 DIAGNOSIS — G54.6 PHANTOM PAIN AFTER AMPUTATION OF LOWER EXTREMITY (HCC): ICD-10-CM

## 2025-07-01 DIAGNOSIS — Z89.611 S/P BILATERAL ABOVE KNEE AMPUTATION (HCC): ICD-10-CM

## 2025-07-01 DIAGNOSIS — E78.49 FAMILIAL COMBINED HYPERLIPIDEMIA: ICD-10-CM

## 2025-07-01 DIAGNOSIS — T87.9 COMPLICATION OF AMPUTATED STUMP (HCC): ICD-10-CM

## 2025-07-01 DIAGNOSIS — R94.31 PROLONGED Q-T INTERVAL ON ECG: ICD-10-CM

## 2025-07-01 DIAGNOSIS — E11.51 DM (DIABETES MELLITUS) TYPE II, CONTROLLED, WITH PERIPHERAL VASCULAR DISORDER (HCC): Primary | ICD-10-CM

## 2025-07-01 DIAGNOSIS — K21.9 GASTROESOPHAGEAL REFLUX DISEASE, UNSPECIFIED WHETHER ESOPHAGITIS PRESENT: ICD-10-CM

## 2025-07-01 PROBLEM — R53.1 ASTHENIA DUE TO DISEASE: Status: RESOLVED | Noted: 2020-02-07 | Resolved: 2025-07-01

## 2025-07-01 PROBLEM — F33.9 DEPRESSION, RECURRENT (HCC): Status: RESOLVED | Noted: 2018-03-02 | Resolved: 2025-07-01

## 2025-07-01 PROBLEM — E11.42 DIABETIC PERIPHERAL NEUROPATHY (HCC): Status: RESOLVED | Noted: 2017-05-08 | Resolved: 2025-07-01

## 2025-07-01 PROCEDURE — 99310 SBSQ NF CARE HIGH MDM 45: CPT | Performed by: STUDENT IN AN ORGANIZED HEALTH CARE EDUCATION/TRAINING PROGRAM

## 2025-07-01 NOTE — ASSESSMENT & PLAN NOTE
Patient endorses some ongoing intermittent constipation and bloating  At risk due to relative immobility, comorbidities  Would also note she is on Ozempic which could contribute to gastroparesis, will monitor this closely and consider reducing if needed  Monitor stool output - last BM yesterday OK but going every 2-3 days or so and with bloating  No abd pain/tenderness or N/V or appetite change  Bowel regimen at facility: as per protocol, continue miralax daily, will add docusate and senna, also adding PRN simethicone for gas, adjust as appropriate; consider bisacodyl suppository PRN if no BM in 2-3 days  Encourage mobility as tolerated, PO hydration as appropriate, high fiber diet/prune juice (in outpatient setting as appropriate)  Goal is for 1 easy BM every 1-2 days

## 2025-07-01 NOTE — ASSESSMENT & PLAN NOTE
Patient with persistent mildly elevated WBC/neutrophilia since around June 2024  Seems to have coincided with likely viral URI and associated diarrhea around that time however has not improved  Monitor on routine labs - seems fairly mild but persistent as of Dec 2024.  Monitor for acute infectious symptoms  Patient denies any unintentional weight loss, fevers, night sweats, chills, or other overt signs of systemic or malignant pathology.  Has established with Heme/Onc, issue considered likely secondary to inflammation and chronic medical conditions including diabetes, further workup pending and next visit in November

## 2025-07-01 NOTE — ASSESSMENT & PLAN NOTE
mood stable and well controlled overall. Mood tends to be down over winter/holidays. Gets down at times. Gets anxious around appointments/procedures  Psych following at facility  Supportive care  Continue sertraline 100 mg po daily at bedtime, bupropion 75mg daily  Has alprazolam 0.25mg daily PRN as well  (Was on quetiapine, has been weaned and discontinued as of April 2024 and appears tolerating well without it)  (Was on bupropion, has been discontinued as of Jan 2025 and appears stable without it)  Not yet ready to try weaning above regimen, continue to look for opportunities for GDR in conjunction with Psych

## 2025-07-01 NOTE — ASSESSMENT & PLAN NOTE
Recent TSH WNL  Continue levothyroxine 62.5mcg daily  No apparent acute/associated symptoms   Monitor TSH periodically; Adjust dosing as appropriate  Patient is following Endocrine outpatient

## 2025-07-01 NOTE — ASSESSMENT & PLAN NOTE
Lab Results   Component Value Date    HGBA1C 9.1 (A) 03/19/2025     Recent A1c 9.1 worsened  Encourage CCD  Monitor glucose routinely - recent fasting sugars have generally been low-mid 100s, overall much better than before  Avoid hypoglycemia  Encourage lifestyle modifications including healthy diet, weight management, exercise as appropriate  Following Endocrine outpatient. Next appt July 2025  Follow up Ophthalmology/Podiatry (at facility) as appropriate  Continue Lantus 65u daily, titrate further as appropriate.  Recently changed from Trulicity to Ozempic per Endocrine. Has been titrated up to 1mg weekly, appears to be tolerating well, though will defer further titration for now until we make sure her constipation/bowel symptoms are controlled  One of her goals has been to reduce pill burden therefore preferring generally not to add further meds but to titrate existing ones  Follow up HgbA1c Q3 months July/Oct/Jan/April

## 2025-07-01 NOTE — ASSESSMENT & PLAN NOTE
Monitor BP - generally stable, somewhat variable, around 120s-140s systolic, at times lower or higher  Pulse generally 60s-70s  Avoid hypotension  No acute cardiac complaints  Continue regimen including amlodipine 10mg daily, lisinopril 40mg daily, metoprolol tartrate 50mg BID, HCTZ 12.5mg daily, hydralazine 100mg TID, amiodarone 100mg daily  Consider weaning/adjusting extensive regimen as appropriate, especially if BP remains well controlled or becomes softer. For now seems stable on current regimen  Following Cardiology outpatient

## 2025-07-01 NOTE — ASSESSMENT & PLAN NOTE
Recent issue with non-healing right lower extremity AKA stump wound  Recently hospitalized for this due to some concern of osteomyelitis  Was eval by Vascular and ID inpatient. Osteomyelitis was considered unlikely per ID.  Enterococcus on wound culture  s/p washout and debridement 5/23/25  treated with antibiotics including invanz switched to doxycycline through 6/2/25  Wound care consulted and following at facility. Have discussed wound care/packing instructions  S/p I&D on 6/27 with outpatient wound care due to superficial closing of wound  Manage diabetes to help improve wound healing potential  Continue to monitor  Follow up with Vascular and wound care

## 2025-07-01 NOTE — PROGRESS NOTES
[facility]  Facility: [facility]    PROGRESS NOTE  Nursing Home Place of Service: nursing home place of service: POS 32 Unskilled- No Part A Coverage    NAME: Dianna Varghese  : 1960 AGE: 64 y.o. SEX: female MRN: [id number]  DATE OF ENCOUNTER: 2025    Assessment and Plan     Problem List Items Addressed This Visit       Esophageal reflux    -stable per patient  -continue PRN calcium carbonate. Pantoprazole recently discontinued and she seems stable without this.  -recommend OOB with meals, sit upright for at least 30 minutes afterwards, avoid trigger foods  -continue to monitor  -follow up with GI as appropriate/needed         DM (diabetes mellitus) type II, controlled, with peripheral vascular disorder (HCC) - Primary      Lab Results   Component Value Date    HGBA1C 9.1 (A) 2025     Recent A1c 9.1 worsened  Encourage CCD  Monitor glucose routinely - recent fasting sugars have generally been low-mid 100s, overall much better than before  Avoid hypoglycemia  Encourage lifestyle modifications including healthy diet, weight management, exercise as appropriate  Following Endocrine outpatient. Next appt 2025  Follow up Ophthalmology/Podiatry (at facility) as appropriate  Continue Lantus 65u daily, titrate further as appropriate.  Recently changed from Trulicity to Ozempic per Endocrine. Has been titrated up to 1mg weekly, appears to be tolerating well, though will defer further titration for now until we make sure her constipation/bowel symptoms are controlled  One of her goals has been to reduce pill burden therefore preferring generally not to add further meds but to titrate existing ones  Follow up HgbA1c Q3 months July/Oct//April           Anxiety and depression    mood stable and well controlled overall. Mood tends to be down over winter/holidays. Gets down at times. Gets anxious around appointments/procedures  Psych following at facility  Supportive care  Continue  sertraline 100 mg po daily at bedtime, bupropion 75mg daily  Has alprazolam 0.25mg daily PRN as well  (Was on quetiapine, has been weaned and discontinued as of April 2024 and appears tolerating well without it)  (Was on bupropion, has been discontinued as of Jan 2025 and appears stable without it)  Not yet ready to try weaning above regimen, continue to look for opportunities for GDR in conjunction with Psych         Hypertension    Monitor BP - generally stable, somewhat variable, around 120s-140s systolic, at times lower or higher  Pulse generally 60s-70s  Avoid hypotension  No acute cardiac complaints  Continue regimen including amlodipine 10mg daily, lisinopril 40mg daily, metoprolol tartrate 50mg BID, HCTZ 12.5mg daily, hydralazine 100mg TID, amiodarone 100mg daily  Consider weaning/adjusting extensive regimen as appropriate, especially if BP remains well controlled or becomes softer. For now seems stable on current regimen  Following Cardiology outpatient         Vitamin D deficiency    Recent vit D low  Will start vit D 400u daily  Monitor periodically         Familial combined hyperlipidemia    Continue statin          Hyperparathyroidism (HCC)    Seems to be primary on chart review  Continue multivitamin/vit D  Monitor calcium on routine labs - has been stable/WNL recently  Following Endocrine outpatient         PAD (peripheral artery disease) (HCC) (Chronic)    S/p left AKA on 11/23/22  S/p right AKA stump revision on 01/27/23  Continue aspirin, statin, Eliquis  Working with rehab regarding prosthetics and power wheelchair, PT/OT as appropriate  Follow up with Vascular as appropriate         Anemia    Seems fairly mild, stable on lab review  -monitor on routine labs  -borderline microcytic and hx of low iron on lab review - continue ferrous sulfate  -likely component of CKD as well  -monitor for acute bleed - no present signs  -consider further workup, Heme consult if persistent/worsening  -transfuse PRN  Hb <7  -low threshold to hold anticoagulation if evidence of bleed or worsening Hb         History of CVA (cerebrovascular accident)    Pt had right CVA 11/14/2022  Continue aspirin, statin  Pt has been off plavix since 03/2023 after vascular visit         Prolonged Q-T interval on ECG    Noted on prior EKGs  No apparent acute/associated symptoms  Avoid QT prolonging medications as able  Following with Cardiology         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)    Bilateral AKA as above         S/P bilateral above knee amputation (HCC)    Working with PT/OT  Working with prosthetics  Working with power wheelchair training         Acquired hypothyroidism    Recent TSH WNL  Continue levothyroxine 62.5mcg daily  No apparent acute/associated symptoms   Monitor TSH periodically; Adjust dosing as appropriate  Patient is following Endocrine outpatient         History of cardiac arrest    S/p cardiac arrest on 1/18/2023 considered due to electrolyte abnormalities/severe sepsis  No recent/acute cardiac complaints  Continue cardiac regimen including aspirin, Eliquis, amiodarone, atorvastatin, beta blocker  Follow up with Cardiology as appropriate/yearly         Leukemoid reaction    Patient with persistent mildly elevated WBC/neutrophilia since around June 2024  Seems to have coincided with likely viral URI and associated diarrhea around that time however has not improved  Monitor on routine labs - seems fairly mild but persistent as of Dec 2024.  Monitor for acute infectious symptoms  Patient denies any unintentional weight loss, fevers, night sweats, chills, or other overt signs of systemic or malignant pathology.  Has established with Heme/Onc, issue considered likely secondary to inflammation and chronic medical conditions including diabetes, further workup pending and next visit in November         Phantom pain after amputation of lower extremity (HCC)    Phantom limb pains bilateral lower extremity,  intermittent, at random times but generally worse overnight  Continue gabapentin 200mg BID (did not tolerate weaning, seems stable at this dosing)  Continue to monitor - much improved/controlled         Chronic renal insufficiency, stage 3 (moderate) ([facility])    Lab Results   Component Value Date    EGFR 40 05/28/2025    EGFR 35 05/27/2025    EGFR 36 05/26/2025    CREATININE 1.39 (H) 05/28/2025    CREATININE 1.54 (H) 05/27/2025    CREATININE 1.50 (H) 05/26/2025       Baseline GFR seems consistent with CKD3a bordering on 3b  Likely related to her diabetes  Monitor renal function on routine labs - fairly stable  Avoid nephrotoxins, NSAIDs as able  Encourage PO hydration, respecting volume status           Complication of amputated stump ([facility])    Recent issue with non-healing right lower extremity AKA stump wound  Recently hospitalized for this due to some concern of osteomyelitis  Was eval by Vascular and ID inpatient. Osteomyelitis was considered unlikely per ID.  Enterococcus on wound culture  s/p washout and debridement 5/23/25  treated with antibiotics including invanz switched to doxycycline through 6/2/25  Wound care consulted and following at facility. Have discussed wound care/packing instructions  S/p I&D on 6/27 with outpatient wound care due to superficial closing of wound  Manage diabetes to help improve wound healing potential  Continue to monitor  Follow up with Vascular and wound care         Ambulatory dysfunction    -PT/OT as appropriate  -fall precautions  -encourage appropriate DME use           Slow transit constipation    Patient endorses some ongoing intermittent constipation and bloating  At risk due to relative immobility, comorbidities  Would also note she is on Ozempic which could contribute to gastroparesis, will monitor this closely and consider reducing if needed  Monitor stool output - last BM yesterday OK but going every 2-3 days or so and with bloating  No abd pain/tenderness or N/V or  "appetite change  Bowel regimen at facility: as per protocol, continue miralax daily, will add docusate and senna, also adding PRN simethicone for gas, adjust as appropriate; consider bisacodyl suppository PRN if no BM in 2-3 days  Encourage mobility as tolerated, PO hydration as appropriate, high fiber diet/prune juice (in outpatient setting as appropriate)  Goal is for 1 easy BM every 1-2 days                  Chief Complaint     Follow up 30 day post-readmission    History of Present Illness     Dianna Varghese is a 64 y.o. female who was seen today for follow up.  PMH includes: bilateral above knee amputations, cardiac arrest, type 2 DM, TIA, major depressive disorder, peripheral vascular disease, hypertension, hyperlipidemia, and GERD   Code Status: Full    Patient seen and examined in room  Others present: none  Patient laying in bed with head elevated  Appears comfortable, awake, alert, oriented to situation, able to converse appropriately  Very polite, Aox3, in good spirits, appears to be a reasonable historian, mentation appearing similar to my prior visit. She notes she feels well, no acute concerns, expresses she is happy to have had her wound cared for by outpatient wound care center recently and glad we talked her into getting this done. No acute concerns presently. Happy she got her power wheelchair \"license\" recently for independent operation.  No acute pain anywhere including at R stump wound recently. Endorses rare/intermittent bilateral leg phantom pains much improved/well controlled on present gabapentin dosing.   Endorses good stable appetite, no swallowing trouble, no abd pain/N/V. Does feel some bloating/constipation lately, last BM yesterday was OK/easy but she is going every few days. Urinating well without acute issue. Breathing fine on room air, no orthopnea. No CP/palpitations; does have some chronic stable dyspnea on exertion which is unchanged. Denies orthostatic lightheadedness. " "Sleeping well. Endorses mood stable/good lately. She does not feel acutely sick or feverish or confused. No acute cardiopulmonary, abdominal, or urinary symptoms; see ROS for more details.    She remains clear she prefers to defer any screenings like colonoscopy, mammogram etc until she feels more independent/mobile        No further questions or acute concerns identified.  No further acute concerns per nursing.  Also discussed case with wound care RN Rizwan on unit, as patient recently was seen by outpatient wound care for I&D        Lab Review:  Baseline GFR: around 40s-50s  1/18/24: CBC with Hb 10.8; CMP with Cr 1.43, eGFR 41; TSH 8.20, FT4 0.6; A1c 7.7  3/6/24: CBC with Hb 10.8; BMP generally stable, Cr 1.41, eGFR 42; TSH 7.96  4/18/24: CBC with Hb 10.9; CMP with Cr 1.51, eGFR 39; A1c 9.3; TSH WNL  6/3/24: CBC with WBC 11.8, Hb 10.5  6/10/24: CBC with WBC 12.3, Hb 10.5  6/17/24: CBC with WBC 12.1, Hb 10.5  6/26/24: CBC with WBC 10.7, Hb 10.6; BMP generally stable/non-actionable, Cr 1.08, GFR 58; Mg 1.7  7/17/24: CBC with WBC 11.8, Hb 11.0; CMP generally stable/non-actionable, Cr 1.43, eGFR 41; A1c 9.7  8/14/24: BMP with Cr 1.21, GFR 50  8/28/24: CBC with WBC 11.9, Hb 11.3  10/16/24: CBC with WBC 11.2, Hb 11.6; CMP generally stable/non-actionable, Cr 0.99, eGFR 64; A1c 9.4  10/21/24: CBC with WBC 12.0, Hb 11.2, manual diff with elevated neutrophils  12/12/24: CBC with WBC 11.8, Hb 10.9  1/9/25: respiratory viral panel negative  1/15/25: CBC with WBC 13.6, Hb 10.7; CMP with Cr 1.25, GFR 48; A1c 8.3  1/28/25: CBC with WBC 10.7, Hb 10.8; CMP with Cr 1.21, GFR 50; ESR 46; CRP 19.7; iron 46; flow cytometry \"There is no immunophenotypic evidence of a clonal B or T-cell population or increase in CD34+ blasts.\"  2/14-19/25: TSH 11.55, fT4 WNL  4/1/25: TSH 7.38, fT4 WNL  4/16/25: CBC with WBC 13.7, Hb 10.5; CMP with Cr 1.18, GFR 52; A1c 9.1  4/23/25: R stump wound culture \"No Neutrophils noted.No organisms observed. CULTURE " "RESULTS: NO GROWTH\"  4/29/25: CBC with WBC 12.6, Hb 10.7  5/28/25: BMP with Cr 1.39 (improved), GFR 40; CBC with WBC 12.2 (stable), Hb 10.4  6/27/25: CMP with Cr 1.47, GFR 40; TSH WNL; vit D 23; PTH WNL              Lab Results   Component Value Date     HGBA1C 9.1 (A) 03/19/2025            Lab Results   Component Value Date     NEU4BHFQJEQR 1.367 01/23/2023            Lab Results   Component Value Date     VDFXHIOC90 625 01/24/2023            Lab Results   Component Value Date     IRON 34 (L) 01/24/2023     TIBC 138 (L) 01/24/2023     FERRITIN 910 (H) 01/24/2023            Lab Results   Component Value Date     CHOLESTEROL 102 11/14/2022            Lab Results   Component Value Date     HDL 28 (L) 11/14/2022            Lab Results   Component Value Date     TRIG 677 (H) 01/21/2023            Lab Results   Component Value Date     NONHDLC 177 08/12/2021            Lab Results   Component Value Date     LDLCALC 55 11/14/2022               CT pelvis w contrast  Result Date: 5/22/2025  1.  There is a thick-walled enhancing tract extending from the anterior medial aspect of the right lower extremity stump wound, into the distal right femoral stump. There is also a lucent tortuous channel extending retrograde within the right femur. This  is consistent with interval worsening/progression of the chronic pyogenic osteomyelitis.      XR femur 2 views RIGHT  Result Date: 5/22/2025  Status post above-knee amputation without radiographic evidence of osteomyelitis at this time.     US right leg 5/21/25 \"FINDINGS: Soft tissue ultrasound. Region of concern: Right leg amputation site. There is a suggestion of a complex collection, 2.4 x 1.3 x 2.1 cm. Possibilities include hematoma and abscess.  CONCLUSION: Suggestion of a complex collection. Consider CT for further delineation.\"            EKG Jan 2023: NSR, 91bpm, Qtc 511  LEON Jan 2023: EF 60, \"No vegetations or other evidence of endocarditis seen \"          The following " portions of the patient's history were reviewed and updated as appropriate: allergies, current medications, past family history, past medical history, past social history, past surgical history and problem list.    Review of Systems     Review of Systems   Constitutional:  Negative for appetite change, chills, diaphoresis, fatigue and fever.   HENT:  Negative for congestion, drooling, ear pain, hearing loss, rhinorrhea, sore throat and trouble swallowing.    Eyes:  Negative for pain, discharge, redness, itching and visual disturbance.   Respiratory:  Negative for cough, chest tightness, shortness of breath and wheezing.    Cardiovascular:  Negative for chest pain, palpitations and leg swelling.   Gastrointestinal:  Negative for abdominal pain, blood in stool, constipation, diarrhea, nausea and vomiting.   Genitourinary:  Negative for difficulty urinating, dysuria, flank pain and hematuria.   Musculoskeletal:  Positive for gait problem. Negative for arthralgias, back pain and neck pain.   Skin:  Positive for wound. Negative for color change.   Neurological:  Negative for dizziness, facial asymmetry, speech difficulty, weakness, light-headedness and headaches.   Psychiatric/Behavioral:  Negative for agitation, behavioral problems and confusion. The patient is not nervous/anxious and is not hyperactive.    All other systems reviewed and are negative.      Active Problem List   Problem List[1]    Objective     Vital Signs:     BP: 132/70 mmHg  6/25/2025 16:17 Temp:98.8 °F  6/3/2025 02:03 Pulse:66 bpm  7/1/2025 08:51 Weight:203.5 Lbs  6/25/2025 14:00   Resp:20 Breaths/min  6/3/2025 02:03 BS:128 mg/dL  7/1/2025 15:58 O2:99 %  6/1/2025 00:57 Pain:0  7/1/2025 15:58       Physical Exam  Vitals reviewed.   Constitutional:       General: She is not in acute distress.     Appearance: She is obese. She is not toxic-appearing or diaphoretic.   HENT:      Head: Normocephalic and atraumatic.      Right Ear: External ear normal.       Left Ear: External ear normal.      Nose: Nose normal. No rhinorrhea.      Mouth/Throat:      Mouth: Mucous membranes are dry.      Pharynx: Oropharynx is clear. No posterior oropharyngeal erythema.     Eyes:      General: No scleral icterus.        Right eye: No discharge.         Left eye: No discharge.      Extraocular Movements: Extraocular movements intact.      Conjunctiva/sclera: Conjunctivae normal.      Pupils: Pupils are equal, round, and reactive to light.       Cardiovascular:      Rate and Rhythm: Normal rate and regular rhythm.   Pulmonary:      Effort: Pulmonary effort is normal. No respiratory distress.      Breath sounds: Normal breath sounds. No wheezing or rales.   Abdominal:      General: Bowel sounds are normal. There is no distension.      Palpations: Abdomen is soft.      Tenderness: There is no abdominal tenderness. There is no guarding.     Musculoskeletal:      Cervical back: No rigidity.      Comments: Bilateral AKA, sites appear well healed  Bottom surface of right leg stump noted to have one ~0.5 inch wound with scant drainage, packing in place. No notable surrounding erythema. No notable tenderness to area. No appreciable local edema.     Skin:     General: Skin is warm and dry.      Coloration: Skin is not jaundiced.     Neurological:      General: No focal deficit present.      Mental Status: She is alert and oriented to person, place, and time. Mental status is at baseline.     Psychiatric:         Mood and Affect: Mood normal.         Behavior: Behavior normal.         Thought Content: Thought content normal.         Judgment: Judgment normal.         Pertinent Laboratory/Diagnostic Studies:  Laboratory and Imaging studies reviewed. Full report in the paper chart.     Current Medications   Medications reviewed and updated in facility chart.      -Total time spent on this encounter today including documentation and workup review, face to face time, history and exam, and  documentation/orders was approximately 55 minutes.  -This note will be copied to [facility] EMR where vitals and medication orders are placed.    Seb Chun D.O.  Geriatric Medicine  7/1/2025 5:07 PM         [1]   Patient Active Problem List  Diagnosis    Esophageal reflux    DM (diabetes mellitus) type II, controlled, with peripheral vascular disorder (HCC)    Anxiety and depression    Hypertension    Vitamin D deficiency    Familial combined hyperlipidemia    Hyperparathyroidism (HCC)    PAD (peripheral artery disease) (HCC)    Anemia    HIT (heparin-induced thrombocytopenia) (HCC)    Mild protein-calorie malnutrition (HCC)    History of CVA (cerebrovascular accident)    Hypomagnesemia    Prolonged Q-T interval on ECG    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)    S/P bilateral above knee amputation (HCC)    Acquired hypothyroidism    History of cardiac arrest    Advance care planning    Leukemoid reaction    Phantom pain after amputation of lower extremity (HCC)    Polypharmacy    Chronic renal insufficiency, stage 3 (moderate) (HCC)    Primary open angle glaucoma (POAG) of left eye, mild stage    Complication of amputated stump (HCC)    At risk for constipation    At risk for delirium    Ambulatory dysfunction    Slow transit constipation

## 2025-07-01 NOTE — ASSESSMENT & PLAN NOTE
Lab Results   Component Value Date    EGFR 40 05/28/2025    EGFR 35 05/27/2025    EGFR 36 05/26/2025    CREATININE 1.39 (H) 05/28/2025    CREATININE 1.54 (H) 05/27/2025    CREATININE 1.50 (H) 05/26/2025       Baseline GFR seems consistent with CKD3a bordering on 3b  Likely related to her diabetes  Monitor renal function on routine labs - fairly stable  Avoid nephrotoxins, NSAIDs as able  Encourage PO hydration, respecting volume status

## 2025-07-01 NOTE — ASSESSMENT & PLAN NOTE
Phantom limb pains bilateral lower extremity, intermittent, at random times but generally worse overnight  Continue gabapentin 200mg BID (did not tolerate weaning, seems stable at this dosing)  Continue to monitor - much improved/controlled

## 2025-07-03 ENCOUNTER — TELEPHONE (OUTPATIENT)
Age: 65
End: 2025-07-03

## 2025-07-03 ENCOUNTER — OFFICE VISIT (OUTPATIENT)
Dept: ENDOCRINOLOGY | Facility: CLINIC | Age: 65
End: 2025-07-03
Payer: COMMERCIAL

## 2025-07-03 VITALS — DIASTOLIC BLOOD PRESSURE: 80 MMHG | SYSTOLIC BLOOD PRESSURE: 140 MMHG | HEART RATE: 68 BPM

## 2025-07-03 DIAGNOSIS — E11.51 DM (DIABETES MELLITUS) TYPE II, CONTROLLED, WITH PERIPHERAL VASCULAR DISORDER (HCC): Primary | ICD-10-CM

## 2025-07-03 DIAGNOSIS — E03.9 ACQUIRED HYPOTHYROIDISM: ICD-10-CM

## 2025-07-03 DIAGNOSIS — E11.65 TYPE 2 DIABETES MELLITUS WITH HYPERGLYCEMIA, WITH LONG-TERM CURRENT USE OF INSULIN (HCC): ICD-10-CM

## 2025-07-03 DIAGNOSIS — Z79.4 TYPE 2 DIABETES MELLITUS WITH HYPERGLYCEMIA, WITH LONG-TERM CURRENT USE OF INSULIN (HCC): ICD-10-CM

## 2025-07-03 DIAGNOSIS — I10 PRIMARY HYPERTENSION: ICD-10-CM

## 2025-07-03 DIAGNOSIS — E21.3 HYPERPARATHYROIDISM (HCC): ICD-10-CM

## 2025-07-03 DIAGNOSIS — E55.9 VITAMIN D DEFICIENCY: ICD-10-CM

## 2025-07-03 PROCEDURE — 99214 OFFICE O/P EST MOD 30 MIN: CPT | Performed by: PHYSICIAN ASSISTANT

## 2025-07-03 RX ORDER — INSULIN GLARGINE 100 [IU]/ML
INJECTION, SOLUTION SUBCUTANEOUS
Qty: 10 ML | Refills: 4 | Status: SHIPPED | OUTPATIENT
Start: 2025-07-15 | End: 2025-07-03

## 2025-07-03 RX ORDER — INSULIN GLARGINE 100 [IU]/ML
INJECTION, SOLUTION SUBCUTANEOUS
Qty: 10 ML | Refills: 4 | Status: SHIPPED | OUTPATIENT
Start: 2025-07-15

## 2025-07-03 NOTE — ASSESSMENT & PLAN NOTE
TSH:4.65  Maintained on levothyroxine 62.5mcg daily, continue - recently adjusted by facility provider  Encouraged patient to obtain updated TSH  Orders:  •  TSH, 3rd generation with Free T4 reflex; Future  •  TSH, 3rd generation with Free T4 reflex; Future

## 2025-07-03 NOTE — ASSESSMENT & PLAN NOTE
Lab Results   Component Value Date    HGBA1C 9.1 (A) 03/19/2025   Current HbA1c represents poor control. Blood sugars demonstrating improvement likely due to Ozempic.   Continue current regimen with the following adjustments:  Increase Ozempic to 2mg once patient has completed 4 weeks of 1mg dose  Once Ozempic is increased, decrease Lantus to 55u  Advised to adhere to diabetic diet, and recommended staying active/exercising routinely.  Discussed symptoms and treatment of hypoglycemia.    Recommended routine follow-up with Ophthalmology and foot exams.   Ordered blood work to complete prior to next visit.  Orders:  •  Comprehensive metabolic panel; Future  •  Hemoglobin A1C; Future  •  semaglutide, 2 mg/dose, (Ozempic, 2 MG/DOSE,) 8 mg/ mL injection pen; Inject 0.75 mL (2 mg total) under the skin every 7 days Do not start before July 15, 2025.  •  Hemoglobin A1C; Future  •  Comprehensive metabolic panel; Future  •  Albumin / creatinine urine ratio; Future  •  Lipid panel; Future

## 2025-07-03 NOTE — PROGRESS NOTES
Name: Dianna Varghese      : 1960      MRN: [id number]  Encounter Provider: Day Ivey PA-C  Encounter Date: 7/3/2025   Encounter department: [facility] [place]    Chief Complaint   Patient presents with   • Diabetes Type 2   :  Assessment & Plan  DM (diabetes mellitus) type II, controlled, with peripheral vascular disorder (HCC)    Lab Results   Component Value Date    HGBA1C 9.1 (A) 2025   Current HbA1c represents poor control. Blood sugars demonstrating improvement likely due to Ozempic.   Continue current regimen with the following adjustments:  Increase Ozempic to 2mg once patient has completed 4 weeks of 1mg dose  Once Ozempic is increased, decrease Lantus to 55u  Advised to adhere to diabetic diet, and recommended staying active/exercising routinely.  Discussed symptoms and treatment of hypoglycemia.    Recommended routine follow-up with Ophthalmology and foot exams.   Ordered blood work to complete prior to next visit.  Orders:  •  Comprehensive metabolic panel; Future  •  Hemoglobin A1C; Future  •  semaglutide, 2 mg/dose, (Ozempic, 2 MG/DOSE,) 8 mg/ mL injection pen; Inject 0.75 mL (2 mg total) under the skin every 7 days Do not start before July 15, 2025.  •  Hemoglobin A1C; Future  •  Comprehensive metabolic panel; Future  •  Albumin / creatinine urine ratio; Future  •  Lipid panel; Future    Primary hypertension  BP at goal.   Continue current medication regimen.        Acquired hypothyroidism  TSH:4.65  Maintained on levothyroxine 62.5mcg daily, continue - recently adjusted by facility provider  Encouraged patient to obtain updated TSH  Orders:  •  TSH, 3rd generation with Free T4 reflex; Future  •  TSH, 3rd generation with Free T4 reflex; Future    Hyperparathyroidism (HCC)  PTH: 73.8  Continue vitamin D3 2000 IU daily  Orders:  •  Vitamin D 25 hydroxy; Future    Vitamin D deficiency    Orders:  •  Vitamin D 25 hydroxy; Future    Type 2  diabetes mellitus with hyperglycemia, with long-term current use of insulin ([facility])    Lab Results   Component Value Date    HGBA1C 9.1 (A) 03/19/2025       Orders:  •  insulin glargine (LANTUS) 100 units/mL subcutaneous injection; Inject 55 Units under the skin every morning. Dose change Do not start before July 15, 2025.          Pertinent Medical History   Patient with a history of type 2 diabetes for over 10 years.  She has a history complicated by peripheral neuropathy, peripheral arterial disease, bilateral AKA's, CVA.        History of Present Illness {?Quick Links Encounters * My Last Note * Last Note in Specialty * Snapshot * Since Last Visit * History :26399}  History of Present Illness  The patient is a 64-year-old female with secondary hyperparathyroidism, type 2 diabetes complicated by CKD, bilateral AKA, and hypothyroidism, presenting for follow-up.    There have been no significant changes since the last visit. Her blood sugar is well-controlled, with occasional readings below 100. She is using the Noé 3 system and is on Ozempic 1 mg, which was increased from 0.5 mg two weeks ago, with injections on Tuesdays. She experiences bloating from Ozempic and has been prescribed medication for this. Additionally, her Lantus dosage has been increased from 55 to 65 units.  Blood sugar readings have been ranging from 83-2 12    Her phantom pain is managed with gabapentin. A blister on her stump was opened twice at the wound center a few weeks ago.    She has been prescribed vitamin D supplements, and her levothyroxine dosage was recently adjusted.      Current diabetic regimen:   Ozempic 1mg  Lantus 65u daily      Review of Systems as per HPI         Medical History Reviewed by provider this encounter:     .    Objective {?Quick Links Trend Vitals * Enter New Vitals * Results Review * Timeline (Adult) * Labs * Imaging * Cardiology * Procedures * Lung Cancer Screening * Surgical eConsent :09044}  /80 (BP  Location: Right arm, Patient Position: Sitting, Cuff Size: Adult)   Pulse 68      There is no height or weight on file to calculate BMI.  Wt Readings from Last 3 Encounters:   06/20/25 93.9 kg (207 lb)   05/22/25 98.3 kg (216 lb 11.4 oz)   02/13/25 90.5 kg (199 lb 8 oz)     Physical Exam    Physical Exam  Vitals reviewed.   Constitutional:       General: She is not in acute distress.     Appearance: She is well-developed.   HENT:      Head: Normocephalic and atraumatic.     Eyes:      General: No scleral icterus.     Conjunctiva/sclera: Conjunctivae normal.     Pulmonary:      Effort: Pulmonary effort is normal.     Musculoskeletal:      Comments: B/l aka     Neurological:      Mental Status: She is alert.     Psychiatric:         Attention and Perception: Attention normal.       Last Eye Exam: Not on file  Last Foot Exam: Not on file - B/L BKA  Health Maintenance   Topic Date Due   • Diabetic Eye Exam  12/09/2022       Results  Labs:  PTH in range. TSH at higher end of normal range. A1c 9.1.  Labs: I have reviewed pertinent labs including:   Lab Results   Component Value Date    HGBA1C 9.1 (A) 03/19/2025    HGBA1C 9.4 10/16/2024    HGBA1C 9.8 (H) 10/25/2022      Lab Results   Component Value Date    CREATININE 1.39 (H) 05/28/2025    CREATININE 1.54 (H) 05/27/2025    CREATININE 1.50 (H) 05/26/2025    BUN 31 (H) 05/28/2025    K 4.1 05/28/2025     05/28/2025    CO2 24 05/28/2025      eGFRcr   Date Value Ref Range Status   03/06/2023 63 >59 Final     eGFR   Date Value Ref Range Status   05/28/2025 40 ml/min/1.73sq m Final      HDL, Direct   Date Value Ref Range Status   11/14/2022 28 (L) >=50 mg/dL Final     Comment:     Specimen collection should occur prior to Metamizole administration due to the potential for falsley depressed results.     Triglycerides   Date Value Ref Range Status   01/21/2023 677 (H) See Comment mg/dL Final     Comment:     Triglyceride:     0-9Y            <75mg/dL     10Y-17Y         <90  mg/dL       >=18Y     Normal          <150 mg/dL     Borderline High 150-199 mg/dL     High            200-499 mg/dL        Very High       >499 mg/dL    Specimen collection should occur prior to N-Acetylcysteine or Metamizole administration due to the potential for falsely depressed results.      ALT   Date Value Ref Range Status   05/23/2025 32 7 - 52 U/L Final     Comment:     Specimen collection should occur prior to Sulfasalazine administration due to the potential for falsely depressed results.    03/06/2023 108 (H) <56 U/L Final     AST   Date Value Ref Range Status   05/23/2025 23 13 - 39 U/L Final   03/06/2023 74 (H) <41 U/L Final     Alkaline Phosphatase   Date Value Ref Range Status   05/23/2025 69 34 - 104 U/L Final   03/06/2023 254 (H) 35 - 120 U/L Final        There are no Patient Instructions on file for this visit.    Discussed with the patient and all questioned fully answered. She will call me if any problems arise.

## 2025-07-03 NOTE — TELEPHONE ENCOUNTER
Nurse, Amanda, asking when to decrease Lantus dose. Made aware per OV note from today:    Increase Ozempic to 2mg once patient has completed 4 weeks of 1mg dose  Once Ozempic is increased, decrease Lantus to 55u    Amanda verbalized understanding.

## 2025-07-07 ENCOUNTER — TELEPHONE (OUTPATIENT)
Age: 65
End: 2025-07-07

## 2025-07-07 NOTE — TELEPHONE ENCOUNTER
PA for Ozempic, 2 MG SUBMITTED to [facility]    via    [x]CMM-KEY: [id number]  [x]PA sent as URGENT    All office notes, labs and other pertaining documents and studies sent. Clinical questions answered. Awaiting determination from insurance company.     Turnaround time for your insurance to make a decision on your Prior Authorization can take 7-21 business days.

## 2025-07-11 ENCOUNTER — OFFICE VISIT (OUTPATIENT)
Dept: WOUND CARE | Facility: CLINIC | Age: 65
End: 2025-07-11
Payer: COMMERCIAL

## 2025-07-11 VITALS
TEMPERATURE: 97.2 F | RESPIRATION RATE: 18 BRPM | SYSTOLIC BLOOD PRESSURE: 145 MMHG | DIASTOLIC BLOOD PRESSURE: 68 MMHG | HEART RATE: 76 BPM

## 2025-07-11 DIAGNOSIS — T81.89XA NON-HEALING SURGICAL WOUND, INITIAL ENCOUNTER: Primary | ICD-10-CM

## 2025-07-11 DIAGNOSIS — E11.51 DM (DIABETES MELLITUS) TYPE II, CONTROLLED, WITH PERIPHERAL VASCULAR DISORDER (HCC): ICD-10-CM

## 2025-07-11 PROCEDURE — 97597 DBRDMT OPN WND 1ST 20 CM/<: CPT | Performed by: NURSE PRACTITIONER

## 2025-07-11 RX ORDER — LIDOCAINE 40 MG/G
CREAM TOPICAL ONCE
Status: COMPLETED | OUTPATIENT
Start: 2025-07-11 | End: 2025-07-11

## 2025-07-11 RX ADMIN — LIDOCAINE: 40 CREAM TOPICAL at 14:30

## 2025-07-11 NOTE — PROGRESS NOTES
Wound Procedure Treatment Right;Distal Knee    Performed by: Papito Grace RN  Authorized by: ALEX Valenzuela  Associated wounds:   Wound 05/22/25 Surgical Knee Right;Distal    Wound cleansed with:  NSS   Applied primary dressing:  Packing   Applied secondary dressing:  Foam   Comments:  AMD packing, mepilex

## 2025-07-11 NOTE — PATIENT INSTRUCTIONS
Orders Placed This Encounter   Procedures    Wound cleansing and dressings Right;Distal Knee     Wash your hands with soap and water.  Remove old dressing, discard into plastic bag and place in trash.  Cleanse the wound with normal saline prior to applying a clean dressing. Do not use tissue or cotton balls. Do not scrub the wound. Pat dry using gauze.  Shower yes, or bed bathe on the days the dressing is changed.           Right amputation site wound:  Lightly pack wound with AMD ribbon. Tape tail.   Cover with a 4x4 silicon bordered foam.   Change dressing daily.         Increase your protein to 3-4 servings per day. Please add glucerna to the patient's diet daily.            Follow up at the wound center in two weeks.     Standing Status:   Future     Expiration Date:   7/18/2025       
15

## 2025-07-11 NOTE — PROGRESS NOTES
Name: Dianna Varghese      : 1960      MRN: [id number]  Encounter Provider: ALEX Valenzuela  Encounter Date: 2025   Encounter department: [facility]  :  Assessment & Plan  Non-healing surgical wound, initial encounter    Orders:    lidocaine (LMX) 4 % cream    Wound cleansing and dressings Right;Distal Knee; Future    Wound Procedure Treatment Right;Distal Knee    DM (diabetes mellitus) type II, controlled, with peripheral vascular disorder (HCC)    Lab Results   Component Value Date    HGBA1C 9.1 (A) 2025            Plan:  1.  F/U visit.  Wound debrided.  Wound improving with healthy granulation tissue present.  No purulent drainage expressed today. Continue AMD packing to wound change daily covered with silicone bordered foam dressing  2.  A1C results reviewed with the patient today.  Patient is to continue to follow recommendations to achieve tight glycemic control  3.  Patient will follow-up in 2 weeks    History of Present Illness   Chief Complaint   Patient presents with    Follow Up Wound Care Visit     Right stump wound   Here for wound follow up.  F/u visit for nonhealing surgical wound of right AKA stump.  Patient reports RN staff at her SNF noticed purulent drainage at her last dressing change and thought she might need another I&D.  She denies any pain, fevers, or chills.      Objective   /68   Pulse 76   Temp (!) 97.2 °F (36.2 °C)   Resp 18       Wound 25 Surgical Knee Right;Distal (Active)   Wound Image   25 1423   Wound Description Pink;Yellow 25 1424   Non-staged Wound Description Full thickness 25 1424   Wound Length (cm) 0.4 cm 25 1424   Wound Width (cm) 0.6 cm 25 1424   Wound Depth (cm) 1.1 cm 25 1424   Wound Surface Area (cm^2) 0.19 cm^2 25 1424   Wound Volume (cm^3) 0.138 cm^3 25 1424   Calculated Wound Volume (cm^3) 0.26 cm^3 25 1424   Change in Wound Size % -1200  "07/11/25 1424   Drainage Amount Small 07/11/25 1424   Drainage Description Yellow 07/11/25 1424   Michaela-wound Assessment Intact;Scar Tissue 07/11/25 1424   Wound packed? Yes 07/11/25 1424   Packing- # removed 1 07/11/25 1424       Debridement   Wound 05/22/25 Surgical Knee Right;Distal     Date/Time: 7/11/2025 2:15 PM    Universal Protocol:  procedure performed by consultantConsent: Written consent obtained  Consent given by: patient  Time out: Immediately prior to procedure a \"time out\" was called to verify the correct patient, procedure, equipment, support staff and site/side marked as required.  Timeout called at: 7/11/2025 3:18 PM.  Patient identity confirmed: verbally with patient    Debridement Details  Performed by: NP  Debridement type: selective  Pain control: lidocaine 4%    Post-debridement measurements  Length (cm): 0.4  Width (cm): 0.6  Depth (cm): 1.1  Percent debrided: 100%  Surface Area (cm^2): 0.19  Area Debrided (cm^2): 0.19  Volume (cm^3): 0.14    Devitalized tissue debrided: biofilm, fibrin and slough  Instrument(s) utilized: curette  Bleeding: small  Hemostasis obtained with: pressure  Procedural pain (0-10): 0  Post-procedural pain: 0   Response to treatment: procedure was tolerated well       Results from last 6 Months   Lab Units 05/23/25  1510   WOUND CULTURE  1+ Growth of Enterobacter cloacae*         "

## 2025-07-16 NOTE — TELEPHONE ENCOUNTER
PA for (Ozempic, 2 MG  NOT REQUIRED     Reason called insurance prior authorization not required          Patient advised by          [] MyChart Message  [] Phone call   []LMOM  []L/M to call office as no active Communication consent on file  []Unable to leave detailed message as VM not approved on Communication consent       Pharmacy advised by    []Fax  [x]Phone call

## 2025-08-01 ENCOUNTER — NURSING HOME VISIT (OUTPATIENT)
Dept: GERIATRICS | Facility: OTHER | Age: 65
End: 2025-08-01
Payer: COMMERCIAL

## 2025-08-01 DIAGNOSIS — I73.9 PAD (PERIPHERAL ARTERY DISEASE) (HCC): Chronic | ICD-10-CM

## 2025-08-01 DIAGNOSIS — E11.51 DM (DIABETES MELLITUS) TYPE II, CONTROLLED, WITH PERIPHERAL VASCULAR DISORDER (HCC): Primary | ICD-10-CM

## 2025-08-01 DIAGNOSIS — H57.89 IRRITATION OF BOTH EYES: ICD-10-CM

## 2025-08-01 DIAGNOSIS — K21.9 GASTROESOPHAGEAL REFLUX DISEASE, UNSPECIFIED WHETHER ESOPHAGITIS PRESENT: ICD-10-CM

## 2025-08-01 DIAGNOSIS — T87.9 COMPLICATION OF AMPUTATED STUMP (HCC): ICD-10-CM

## 2025-08-01 DIAGNOSIS — F32.A ANXIETY AND DEPRESSION: ICD-10-CM

## 2025-08-01 DIAGNOSIS — Z89.611 S/P BILATERAL ABOVE KNEE AMPUTATION (HCC): ICD-10-CM

## 2025-08-01 DIAGNOSIS — D72.823 LEUKEMOID REACTION: ICD-10-CM

## 2025-08-01 DIAGNOSIS — G54.6 PHANTOM PAIN AFTER AMPUTATION OF LOWER EXTREMITY (HCC): ICD-10-CM

## 2025-08-01 DIAGNOSIS — S78.119S: ICD-10-CM

## 2025-08-01 DIAGNOSIS — I10 PRIMARY HYPERTENSION: ICD-10-CM

## 2025-08-01 DIAGNOSIS — E21.3 HYPERPARATHYROIDISM (HCC): ICD-10-CM

## 2025-08-01 DIAGNOSIS — Z86.73 HISTORY OF CVA (CEREBROVASCULAR ACCIDENT): ICD-10-CM

## 2025-08-01 DIAGNOSIS — J06.9 VIRAL URI WITH COUGH: ICD-10-CM

## 2025-08-01 DIAGNOSIS — F41.9 ANXIETY AND DEPRESSION: ICD-10-CM

## 2025-08-01 DIAGNOSIS — N18.30 CHRONIC RENAL INSUFFICIENCY, STAGE 3 (MODERATE) (HCC): ICD-10-CM

## 2025-08-01 DIAGNOSIS — Z89.612 S/P BILATERAL ABOVE KNEE AMPUTATION (HCC): ICD-10-CM

## 2025-08-01 DIAGNOSIS — R94.31 PROLONGED Q-T INTERVAL ON ECG: ICD-10-CM

## 2025-08-01 DIAGNOSIS — Z86.74 HISTORY OF CARDIAC ARREST: ICD-10-CM

## 2025-08-01 DIAGNOSIS — E03.9 ACQUIRED HYPOTHYROIDISM: ICD-10-CM

## 2025-08-01 DIAGNOSIS — K59.01 SLOW TRANSIT CONSTIPATION: ICD-10-CM

## 2025-08-01 DIAGNOSIS — E78.49 FAMILIAL COMBINED HYPERLIPIDEMIA: ICD-10-CM

## 2025-08-01 DIAGNOSIS — D50.8 IRON DEFICIENCY ANEMIA SECONDARY TO INADEQUATE DIETARY IRON INTAKE: ICD-10-CM

## 2025-08-01 DIAGNOSIS — R26.2 AMBULATORY DYSFUNCTION: ICD-10-CM

## 2025-08-01 PROCEDURE — 99310 SBSQ NF CARE HIGH MDM 45: CPT | Performed by: STUDENT IN AN ORGANIZED HEALTH CARE EDUCATION/TRAINING PROGRAM

## 2025-08-06 ENCOUNTER — TELEPHONE (OUTPATIENT)
Dept: OTHER | Facility: OTHER | Age: 65
End: 2025-08-06

## 2025-08-06 ENCOUNTER — NURSING HOME VISIT (OUTPATIENT)
Dept: GERIATRICS | Facility: OTHER | Age: 65
End: 2025-08-06
Payer: COMMERCIAL

## 2025-08-06 DIAGNOSIS — G54.6 PHANTOM PAIN AFTER AMPUTATION OF LOWER EXTREMITY (HCC): ICD-10-CM

## 2025-08-06 DIAGNOSIS — J06.9 VIRAL URI WITH COUGH: ICD-10-CM

## 2025-08-06 DIAGNOSIS — E11.51 DM (DIABETES MELLITUS) TYPE II, CONTROLLED, WITH PERIPHERAL VASCULAR DISORDER (HCC): ICD-10-CM

## 2025-08-06 DIAGNOSIS — K59.01 SLOW TRANSIT CONSTIPATION: ICD-10-CM

## 2025-08-06 DIAGNOSIS — T87.9 COMPLICATION OF AMPUTATED STUMP (HCC): Primary | ICD-10-CM

## 2025-08-06 PROCEDURE — 99308 SBSQ NF CARE LOW MDM 20: CPT | Performed by: STUDENT IN AN ORGANIZED HEALTH CARE EDUCATION/TRAINING PROGRAM

## 2025-08-13 ENCOUNTER — OFFICE VISIT (OUTPATIENT)
Dept: WOUND CARE | Facility: CLINIC | Age: 65
End: 2025-08-13
Payer: COMMERCIAL

## (undated) DEVICE — Device

## (undated) DEVICE — RADIFOCUS GLIDECATH: Brand: GLIDECATH

## (undated) DEVICE — MEDI-VAC YANKAUER SUCTION HANDLE W/BULBOUS AND CONTROL VENT: Brand: CARDINAL HEALTH

## (undated) DEVICE — SUT SILK 0 30 IN A306H

## (undated) DEVICE — SURGICEL FIBRILLAR 1 X 2

## (undated) DEVICE — MICROPUNCTURE 401

## (undated) DEVICE — ADHESIVE SKIN CLSR DERMABOND NX

## (undated) DEVICE — CATH DIAG 5FR .035 65CM 6S OMMI-FLUSH

## (undated) DEVICE — CATH BAL CHARGER 4 X 40MM X 135CM

## (undated) DEVICE — MEDI-VAC YANKAUER SUCTION HANDLE W/STRAIGHT TIP & CONTROL VENT: Brand: CARDINAL HEALTH

## (undated) DEVICE — NEEDLE 25G X 1 1/2

## (undated) DEVICE — SUT VICRYL 0 CT-1 CR/8 27 IN JJ41G

## (undated) DEVICE — SUT PROLENE 6-0 BV130 30 IN 8709H

## (undated) DEVICE — GLOVE INDICATOR PI UNDERGLOVE SZ 8 BLUE

## (undated) DEVICE — COBAN 6 IN STERILE

## (undated) DEVICE — KERLIX BANDAGE ROLL: Brand: KERLIX

## (undated) DEVICE — SPONGE LAP 18 X 18 IN STRL RFD

## (undated) DEVICE — TIBURON SPLIT SHEET: Brand: CONVERTORS

## (undated) DEVICE — PINNACLE R/O II INTRODUCER SHEATH WITH RADIOPAQUE MARKER: Brand: PINNACLE

## (undated) DEVICE — INTENDED FOR TISSUE SEPARATION, AND OTHER PROCEDURES THAT REQUIRE A SHARP SURGICAL BLADE TO PUNCTURE OR CUT.: Brand: BARD-PARKER SAFETY BLADES SIZE 15, STERILE

## (undated) DEVICE — INTENDED FOR TISSUE SEPARATION, AND OTHER PROCEDURES THAT REQUIRE A SHARP SURGICAL BLADE TO PUNCTURE OR CUT.: Brand: BARD-PARKER SAFETY BLADES SIZE 10, STERILE

## (undated) DEVICE — SGW STORQ .035 300CM ANGLE STD: Brand: STORQ

## (undated) DEVICE — IMPERVIOUS STOCKINETTE: Brand: DEROYAL

## (undated) DEVICE — 40529 DERMAPROX PAD 11'' X 15'' X 1'': Brand: 40529 DERMAPROX PAD 11'' X 15'' X 1''

## (undated) DEVICE — SYRINGE 50ML LL

## (undated) DEVICE — DRAPE SHEET THREE QUARTER

## (undated) DEVICE — PROXIMATE SKIN STAPLERS (35 WIDE) CONTAINS 35 STAINLESS STEEL STAPLES (FIXED HEAD): Brand: PROXIMATE

## (undated) DEVICE — SUT SILK 2-0 30 IN A305H

## (undated) DEVICE — 3M™ TEGADERM™ TRANSPARENT FILM DRESSING FRAME STYLE, 1626W, 4 IN X 4-3/4 IN (10 CM X 12 CM), 50/CT 4CT/CASE: Brand: 3M™ TEGADERM™

## (undated) DEVICE — GUIDEWIRE WITH ICE™ HYDROPHILIC COATING: Brand: V-18™ CONTROL WIRE™

## (undated) DEVICE — LIGACLIP MCA MULTIPLE CLIP APPLIERS, 20 MEDIUM CLIPS: Brand: LIGACLIP

## (undated) DEVICE — BETHLEHEM UNIVERSAL MINOR GEN: Brand: CARDINAL HEALTH

## (undated) DEVICE — 3000CC GUARDIAN II: Brand: GUARDIAN

## (undated) DEVICE — GLOVE SRG BIOGEL 7.5

## (undated) DEVICE — SUT ETHILON 2-0 FS 18 IN 664H

## (undated) DEVICE — SPECIMEN CONTAINER STERILE PEEL PACK

## (undated) DEVICE — 3M™ V.A.C.® GRANUFOAM™ DRESSING KIT, M8275052, MEDIUM: Brand: 3M™ V.A.C.® GRANUFOAM™

## (undated) DEVICE — PAD GROUNDING ADULT

## (undated) DEVICE — SUT VICRYL 3-0 SH C/R 18 IN J864D

## (undated) DEVICE — DRAPE EQUIPMENT RF WAND

## (undated) DEVICE — UTILITY MARKER,BLACK WITH LABELS: Brand: DEVON

## (undated) DEVICE — CULTURE TUBE AEROBIC

## (undated) DEVICE — BONE WAX WHITE: Brand: BONE WAX WHITE

## (undated) DEVICE — DRAPE SHEET X-LG

## (undated) DEVICE — CURITY IDOFORM PACKING STRIP: Brand: CURITY

## (undated) DEVICE — RADIFOCUS GLIDEWIRE ADVANTAGE GUIDEWIRE: Brand: GLIDEWIRE ADVANTAGE

## (undated) DEVICE — FLEXOR, CHECK-FLO, INTRODUCER ANSEL MODIFICATION - WITH HIGH-FLEX DILATOR AND HYDROPHILIC COATING: Brand: FLEXOR

## (undated) DEVICE — STERILE BETHLEHEM FEM POP PACK: Brand: CARDINAL HEALTH

## (undated) DEVICE — VAC CANISTER 500ML

## (undated) DEVICE — ABDOMINAL PAD: Brand: DERMACEA

## (undated) DEVICE — SUT VICRYL 0 CT-1 18 IN J740D

## (undated) DEVICE — CHLORAPREP HI-LITE 26ML ORANGE

## (undated) DEVICE — PRESTO™ INFLATION DEVICE: Brand: PRESTO

## (undated) DEVICE — DECANTER: Brand: UNBRANDED

## (undated) DEVICE — PROVE COVER: Brand: UNBRANDED

## (undated) DEVICE — INSTRUMENT POUCH: Brand: CONVERTORS

## (undated) DEVICE — SYRINGE 10ML LL

## (undated) DEVICE — DRESSING MEPILEX BORDER 4 X 10 IN

## (undated) DEVICE — SYRINGE 1ML TB 25G X 5/8 NON SAFETY

## (undated) DEVICE — DESTINATION PERIPHERAL GUIDING SHEATH: Brand: DESTINATION

## (undated) DEVICE — VAC DRESSING PREVENA 20CM

## (undated) DEVICE — SUT MONOCRYL 3-0 SH 27 IN Y416H

## (undated) DEVICE — BLADE OSCILLATING ST 1.5 MM

## (undated) DEVICE — CULTURE TUBE ANAEROBIC

## (undated) DEVICE — INTENDED FOR TISSUE SEPARATION, AND OTHER PROCEDURES THAT REQUIRE A SHARP SURGICAL BLADE TO PUNCTURE OR CUT.: Brand: BARD-PARKER ® CARBON RIB-BACK BLADES

## (undated) DEVICE — MEDI-VAC NON-CONDUCTIVE SUCTION TUBING 6MM X 1.8M (6FT.) L: Brand: CARDINAL HEALTH

## (undated) DEVICE — PETRI DISH STERILE

## (undated) DEVICE — TOWEL SURG XR DETECT GREEN STRL RFD

## (undated) DEVICE — PENCILETTE PUSH BUTTON COATED

## (undated) DEVICE — TUBING SUCTION 5MM X 12 FT

## (undated) DEVICE — PREMIUM DRY TRAY LF: Brand: MEDLINE INDUSTRIES, INC.

## (undated) DEVICE — ACE WRAP 6 IN STERILE

## (undated) DEVICE — PLUMEPEN PRO 10FT

## (undated) DEVICE — SUT MONOCRYL 4-0 PS-2 27 IN Y426H

## (undated) DEVICE — GUIDEWIRE VASC .035 260CM 15CM TAP ST

## (undated) DEVICE — IV CATH INTROCAN 18G X 1 1/4 SAFETY

## (undated) DEVICE — TRAY FOLEY 16FR URIMETER SURESTEP

## (undated) DEVICE — CATH BAL CHARGER 6 X 40MM X 135CM

## (undated) DEVICE — DRESSING XEROFORM 5 X 9

## (undated) DEVICE — SUT SILK 3-0 30 IN A304H

## (undated) DEVICE — OCCLUSIVE GAUZE STRIP,3% BISMUTH TRIBROMOPHENATE IN PETROLATUM BLEND: Brand: XEROFORM

## (undated) DEVICE — SUT VICRYL 2-0 CT-2 18 IN J726D

## (undated) DEVICE — LIGACLIP MCA MULTIPLE CLIP APPLIERS, 20 SMALL CLIPS: Brand: LIGACLIP

## (undated) DEVICE — GUIDEWIRE V-14 SHORT TAPER 300 ST

## (undated) DEVICE — SCD SEQUENTIAL COMPRESSION COMFORT SLEEVE MEDIUM KNEE LENGTH: Brand: KENDALL SCD

## (undated) DEVICE — VAC DRESSING SENSATRAC LRG

## (undated) DEVICE — 2000CC GUARDIAN II: Brand: GUARDIAN

## (undated) DEVICE — GAUZE SPONGES,16 PLY: Brand: CURITY

## (undated) DEVICE — BETHLEHEM UNIVERSAL  MIONR EXT: Brand: CARDINAL HEALTH

## (undated) DEVICE — GLOVE SRG BIOGEL ORTHOPEDIC 7.5

## (undated) DEVICE — SUT SILK 4-0 30 IN A303H

## (undated) DEVICE — HANDPIECE SET WITH RETRACTABLE COAXIAL FAN SPRAY TIP AND SUCTION TUBE: Brand: INTERPULSE

## (undated) DEVICE — SPONGE LAP 18 X 18 IN

## (undated) DEVICE — SPONGE STICK WITH PVP-I: Brand: KENDALL

## (undated) DEVICE — SUT VICRYL 0 SH 27 IN J418

## (undated) DEVICE — 3M™ IOBAN™ 2 ANTIMICROBIAL INCISE DRAPE 6648EZ: Brand: IOBAN™ 2

## (undated) DEVICE — NEEDLE COUNTER LG W/RULER

## (undated) DEVICE — GLOVE SRG BIOGEL 8

## (undated) DEVICE — RADIFOCUS GLIDEWIRE: Brand: GLIDEWIRE

## (undated) DEVICE — PENCIL ELECTROSURG E-Z CLEAN -0035H

## (undated) DEVICE — CATH DIAG 6FR IMPULSE 100CM MPA1

## (undated) DEVICE — REM POLYHESIVE ADULT PATIENT RETURN ELECTRODE: Brand: VALLEYLAB

## (undated) DEVICE — GLOVE SRG BIOGEL 7

## (undated) DEVICE — CATH BAL STERLING OTW 3 X 220MM X 150CM

## (undated) DEVICE — ULTRASOUND GEL STERILE FOIL PK